# Patient Record
Sex: MALE | Race: WHITE | NOT HISPANIC OR LATINO | Employment: OTHER | URBAN - METROPOLITAN AREA
[De-identification: names, ages, dates, MRNs, and addresses within clinical notes are randomized per-mention and may not be internally consistent; named-entity substitution may affect disease eponyms.]

---

## 2019-05-22 ENCOUNTER — NURSE TRIAGE (OUTPATIENT)
Dept: NURSING | Facility: CLINIC | Age: 69
End: 2019-05-22

## 2019-05-22 NOTE — TELEPHONE ENCOUNTER
They are calling to ask what type of MD he should see for hurting skin with bug bites. I said internal medicine or family practice. Then, if they feel it's needed, they will give a referral to a specialist.  I connected the wifet with scheduling, for an appointment.  Kylah Olmstead RN-[facility]

## 2019-05-23 ENCOUNTER — OFFICE VISIT (OUTPATIENT)
Dept: FAMILY MEDICINE | Facility: CLINIC | Age: 69
End: 2019-05-23
Payer: MEDICARE

## 2019-05-23 ENCOUNTER — HOSPITAL ENCOUNTER (EMERGENCY)
Facility: CLINIC | Age: 69
Discharge: HOME | End: 2019-05-23
Attending: EMERGENCY MEDICINE | Admitting: EMERGENCY MEDICINE
Payer: MEDICARE

## 2019-05-23 ENCOUNTER — APPOINTMENT (OUTPATIENT)
Dept: CT IMAGING | Facility: CLINIC | Age: 69
End: 2019-05-23
Attending: EMERGENCY MEDICINE
Payer: MEDICARE

## 2019-05-23 VITALS
OXYGEN SATURATION: 99 % | SYSTOLIC BLOOD PRESSURE: 127 MMHG | WEIGHT: 167 LBS | TEMPERATURE: 98.3 F | RESPIRATION RATE: 10 BRPM | HEART RATE: 82 BPM | DIASTOLIC BLOOD PRESSURE: 77 MMHG | HEIGHT: 67 IN | BODY MASS INDEX: 26.21 KG/M2

## 2019-05-23 VITALS
TEMPERATURE: 100.6 F | RESPIRATION RATE: 16 BRPM | BODY MASS INDEX: 26.24 KG/M2 | SYSTOLIC BLOOD PRESSURE: 152 MMHG | DIASTOLIC BLOOD PRESSURE: 104 MMHG | HEART RATE: 94 BPM | HEIGHT: 67 IN | WEIGHT: 167.2 LBS | OXYGEN SATURATION: 98 %

## 2019-05-23 DIAGNOSIS — R55 NEAR SYNCOPE: Primary | ICD-10-CM

## 2019-05-23 DIAGNOSIS — R55 NEAR SYNCOPE: ICD-10-CM

## 2019-05-23 DIAGNOSIS — E03.9 HYPOTHYROIDISM, UNSPECIFIED TYPE: ICD-10-CM

## 2019-05-23 DIAGNOSIS — M47.26 OSTEOARTHRITIS OF SPINE WITH RADICULOPATHY, LUMBAR REGION: ICD-10-CM

## 2019-05-23 DIAGNOSIS — M51.379 DEGENERATION OF LUMBAR OR LUMBOSACRAL INTERVERTEBRAL DISC: ICD-10-CM

## 2019-05-23 DIAGNOSIS — B02.9 HERPES ZOSTER WITHOUT COMPLICATION: ICD-10-CM

## 2019-05-23 DIAGNOSIS — G89.29 CHRONIC LOW BACK PAIN, UNSPECIFIED BACK PAIN LATERALITY, WITH SCIATICA PRESENCE UNSPECIFIED: ICD-10-CM

## 2019-05-23 DIAGNOSIS — M54.5 CHRONIC LOW BACK PAIN, UNSPECIFIED BACK PAIN LATERALITY, WITH SCIATICA PRESENCE UNSPECIFIED: ICD-10-CM

## 2019-05-23 PROBLEM — F41.1 GAD (GENERALIZED ANXIETY DISORDER): Status: ACTIVE | Noted: 2017-05-23

## 2019-05-23 LAB
ALBUMIN SERPL-MCNC: 4 G/DL (ref 3.4–5)
ALBUMIN UR-MCNC: NEGATIVE MG/DL
ALP SERPL-CCNC: 104 U/L (ref 40–150)
ALT SERPL W P-5'-P-CCNC: 23 U/L (ref 0–70)
ANION GAP SERPL CALCULATED.3IONS-SCNC: 6 MMOL/L (ref 3–14)
APPEARANCE UR: CLEAR
AST SERPL W P-5'-P-CCNC: 22 U/L (ref 0–45)
BASOPHILS # BLD AUTO: 0.1 10E9/L (ref 0–0.2)
BASOPHILS NFR BLD AUTO: 0.6 %
BILIRUB SERPL-MCNC: 0.6 MG/DL (ref 0.2–1.3)
BILIRUB UR QL STRIP: NEGATIVE
BUN SERPL-MCNC: 12 MG/DL (ref 7–30)
CALCIUM SERPL-MCNC: 8.9 MG/DL (ref 8.5–10.1)
CHLORIDE SERPL-SCNC: 106 MMOL/L (ref 94–109)
CO2 SERPL-SCNC: 30 MMOL/L (ref 20–32)
COLOR UR AUTO: ABNORMAL
CREAT SERPL-MCNC: 0.94 MG/DL (ref 0.66–1.25)
DIFFERENTIAL METHOD BLD: NORMAL
EOSINOPHIL # BLD AUTO: 0.1 10E9/L (ref 0–0.7)
EOSINOPHIL NFR BLD AUTO: 1.7 %
ERYTHROCYTE [DISTWIDTH] IN BLOOD BY AUTOMATED COUNT: 12.8 % (ref 10–15)
GFR SERPL CREATININE-BSD FRML MDRD: 81 ML/MIN/{1.73_M2}
GLUCOSE BLDC GLUCOMTR-MCNC: 85 MG/DL (ref 70–99)
GLUCOSE SERPL-MCNC: 90 MG/DL (ref 70–99)
GLUCOSE UR STRIP-MCNC: NEGATIVE MG/DL
HCT VFR BLD AUTO: 48.9 % (ref 40–53)
HGB BLD-MCNC: 15.8 G/DL (ref 13.3–17.7)
HGB UR QL STRIP: NEGATIVE
IMM GRANULOCYTES # BLD: 0 10E9/L (ref 0–0.4)
IMM GRANULOCYTES NFR BLD: 0.4 %
KETONES UR STRIP-MCNC: NEGATIVE MG/DL
LACTATE BLD-SCNC: 1.4 MMOL/L (ref 0.7–2)
LEUKOCYTE ESTERASE UR QL STRIP: NEGATIVE
LYMPHOCYTES # BLD AUTO: 1.6 10E9/L (ref 0.8–5.3)
LYMPHOCYTES NFR BLD AUTO: 19.7 %
MCH RBC QN AUTO: 28.7 PG (ref 26.5–33)
MCHC RBC AUTO-ENTMCNC: 32.3 G/DL (ref 31.5–36.5)
MCV RBC AUTO: 89 FL (ref 78–100)
MONOCYTES # BLD AUTO: 1 10E9/L (ref 0–1.3)
MONOCYTES NFR BLD AUTO: 12 %
NEUTROPHILS # BLD AUTO: 5.3 10E9/L (ref 1.6–8.3)
NEUTROPHILS NFR BLD AUTO: 65.6 %
NITRATE UR QL: NEGATIVE
NRBC # BLD AUTO: 0 10*3/UL
NRBC BLD AUTO-RTO: 0 /100
PH UR STRIP: 8 PH (ref 5–7)
PLATELET # BLD AUTO: 213 10E9/L (ref 150–450)
POTASSIUM SERPL-SCNC: 3.5 MMOL/L (ref 3.4–5.3)
PROT SERPL-MCNC: 7.1 G/DL (ref 6.8–8.8)
RBC # BLD AUTO: 5.5 10E12/L (ref 4.4–5.9)
RBC #/AREA URNS AUTO: 1 /HPF (ref 0–2)
SODIUM SERPL-SCNC: 142 MMOL/L (ref 133–144)
SOURCE: ABNORMAL
SP GR UR STRIP: 1.02 (ref 1–1.03)
TROPONIN I SERPL-MCNC: <0.015 UG/L (ref 0–0.04)
UROBILINOGEN UR STRIP-MCNC: 0 MG/DL (ref 0–2)
WBC # BLD AUTO: 8 10E9/L (ref 4–11)
WBC #/AREA URNS AUTO: <1 /HPF (ref 0–5)

## 2019-05-23 PROCEDURE — 25000128 H RX IP 250 OP 636: Performed by: EMERGENCY MEDICINE

## 2019-05-23 PROCEDURE — 93005 ELECTROCARDIOGRAM TRACING: CPT | Performed by: EMERGENCY MEDICINE

## 2019-05-23 PROCEDURE — 70450 CT HEAD/BRAIN W/O DYE: CPT

## 2019-05-23 PROCEDURE — 00000146 ZZHCL STATISTIC GLUCOSE BY METER IP

## 2019-05-23 PROCEDURE — 85025 COMPLETE CBC W/AUTO DIFF WBC: CPT | Performed by: EMERGENCY MEDICINE

## 2019-05-23 PROCEDURE — 87086 URINE CULTURE/COLONY COUNT: CPT | Performed by: EMERGENCY MEDICINE

## 2019-05-23 PROCEDURE — 83605 ASSAY OF LACTIC ACID: CPT | Performed by: EMERGENCY MEDICINE

## 2019-05-23 PROCEDURE — 40000275 ZZH STATISTIC RCP TIME EA 10 MIN

## 2019-05-23 PROCEDURE — 80053 COMPREHEN METABOLIC PANEL: CPT | Performed by: EMERGENCY MEDICINE

## 2019-05-23 PROCEDURE — 74177 CT ABD & PELVIS W/CONTRAST: CPT

## 2019-05-23 PROCEDURE — 25000125 ZZHC RX 250: Performed by: EMERGENCY MEDICINE

## 2019-05-23 PROCEDURE — 81001 URINALYSIS AUTO W/SCOPE: CPT | Performed by: EMERGENCY MEDICINE

## 2019-05-23 PROCEDURE — 99291 CRITICAL CARE FIRST HOUR: CPT | Mod: 25 | Performed by: EMERGENCY MEDICINE

## 2019-05-23 PROCEDURE — A9270 NON-COVERED ITEM OR SERVICE: HCPCS | Mod: GY | Performed by: EMERGENCY MEDICINE

## 2019-05-23 PROCEDURE — 87040 BLOOD CULTURE FOR BACTERIA: CPT | Performed by: EMERGENCY MEDICINE

## 2019-05-23 PROCEDURE — 93010 ELECTROCARDIOGRAM REPORT: CPT | Mod: Z6 | Performed by: EMERGENCY MEDICINE

## 2019-05-23 PROCEDURE — 70498 CT ANGIOGRAPHY NECK: CPT

## 2019-05-23 PROCEDURE — 84484 ASSAY OF TROPONIN QUANT: CPT | Performed by: EMERGENCY MEDICINE

## 2019-05-23 PROCEDURE — 25000132 ZZH RX MED GY IP 250 OP 250 PS 637: Mod: GY | Performed by: EMERGENCY MEDICINE

## 2019-05-23 PROCEDURE — 99214 OFFICE O/P EST MOD 30 MIN: CPT | Performed by: FAMILY MEDICINE

## 2019-05-23 PROCEDURE — 99285 EMERGENCY DEPT VISIT HI MDM: CPT | Mod: 25 | Performed by: EMERGENCY MEDICINE

## 2019-05-23 PROCEDURE — 71260 CT THORAX DX C+: CPT

## 2019-05-23 RX ORDER — VALACYCLOVIR HYDROCHLORIDE 1 G/1
1000 TABLET, FILM COATED ORAL 3 TIMES DAILY
Qty: 21 TABLET | Refills: 0 | Status: SHIPPED | OUTPATIENT
Start: 2019-05-23 | End: 2021-03-23

## 2019-05-23 RX ORDER — FLUOCINONIDE TOPICAL SOLUTION USP, 0.05% 0.5 MG/ML
SOLUTION TOPICAL 2 TIMES DAILY PRN
Status: ON HOLD | COMMUNITY
Start: 2019-01-07 | End: 2021-04-29

## 2019-05-23 RX ORDER — IOPAMIDOL 755 MG/ML
70 INJECTION, SOLUTION INTRAVASCULAR ONCE
Status: COMPLETED | OUTPATIENT
Start: 2019-05-23 | End: 2019-05-23

## 2019-05-23 RX ORDER — LEVOTHYROXINE SODIUM 75 UG/1
75 TABLET ORAL EVERY MORNING
Status: ON HOLD | COMMUNITY
Start: 2019-04-26 | End: 2022-01-04

## 2019-05-23 RX ORDER — ACETAMINOPHEN 500 MG
1000 TABLET ORAL ONCE
Status: COMPLETED | OUTPATIENT
Start: 2019-05-23 | End: 2019-05-23

## 2019-05-23 RX ORDER — SODIUM CHLORIDE 9 MG/ML
1000 INJECTION, SOLUTION INTRAVENOUS CONTINUOUS
Status: DISCONTINUED | OUTPATIENT
Start: 2019-05-23 | End: 2019-05-23 | Stop reason: HOSPADM

## 2019-05-23 RX ORDER — HYDROCORTISONE 10 MG/1
TABLET ORAL
Status: ON HOLD | COMMUNITY
Start: 2019-05-21 | End: 2022-01-04

## 2019-05-23 RX ORDER — TRIAMCINOLONE ACETONIDE 1 MG/G
CREAM TOPICAL 2 TIMES DAILY
COMMUNITY
Start: 2019-02-20 | End: 2022-02-14

## 2019-05-23 RX ORDER — LIDOCAINE 40 MG/G
CREAM TOPICAL
Status: DISCONTINUED | OUTPATIENT
Start: 2019-05-23 | End: 2019-05-23 | Stop reason: HOSPADM

## 2019-05-23 RX ORDER — MINOCYCLINE HYDROCHLORIDE 50 MG/1
50 CAPSULE ORAL DAILY
COMMUNITY
Start: 2019-02-20 | End: 2021-08-23

## 2019-05-23 RX ADMIN — IOPAMIDOL 70 ML: 755 INJECTION, SOLUTION INTRAVENOUS at 09:21

## 2019-05-23 RX ADMIN — SODIUM CHLORIDE 100 ML: 9 INJECTION, SOLUTION INTRAVENOUS at 09:21

## 2019-05-23 RX ADMIN — ACETAMINOPHEN 1000 MG: 500 TABLET, FILM COATED ORAL at 09:50

## 2019-05-23 ASSESSMENT — MIFFLIN-ST. JEOR
SCORE: 1468.2
SCORE: 1469.1

## 2019-05-23 ASSESSMENT — ENCOUNTER SYMPTOMS
WEAKNESS: 1
BACK PAIN: 1
ABDOMINAL PAIN: 0
SHORTNESS OF BREATH: 0
FEVER: 0

## 2019-05-23 NOTE — ED NOTES
Responded to RRT in clinic for presyncopal pt -pt woke at 0200 with fever and nausea-has had ha x 2 days and painful itching rt buttock and rt lower back pain x 2 1/2 weeks   Blood glucose 85

## 2019-05-23 NOTE — ED PROVIDER NOTES
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  Patient had originally been seen and schedule an appointment in clinic after patient noted rash on the buttock.  He also was not feeling well overnight, with chills, borderline elevated temperature, and overall generalized malaise.  He denies any other significant changes in health.  Does have mild generalized headache, with no severe headache.  Denies any neck pain.  States the buttocks, no new rashes have been noted.  Denies any abdominal pain, chest pain, or shortness of breath.  Patient was at clinic this morning, when he felt as if he was becoming nauseous, and going to vomit.  He was beginning to walk down the hallway a clinic where he felt as if his vision was going dark, and black, and subsequently was helped to the floor.  There was no loss of consciousness.  Did not hit his head.  Patient does take aspirin, no other blood thinning medications.  Other than generalized achiness currently, patient denies any other specific complaints.    Allergies:  Allergies   Allergen Reactions     Penicillins Other (See Comments) and Hives     swelling, hives         Problem List:    Patient Active Problem List    Diagnosis Date Noted     TUYET (generalized anxiety disorder) 05/23/2017     Priority: Medium     Degeneration of lumbar or lumbosacral intervertebral disc 01/04/2016     Priority: Medium     Osteoarthritis of spine with radiculopathy, lumbar region 01/04/2016     Priority: Medium     Epidural lipomatosis 12/03/2015     Priority: Medium     Chronic lower back  "pain 12/03/2015     Priority: Medium     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 Thyroid started 4/10  Low cortisol [1] 5/7/10 so secondary adrenal insufficiency  hypophysectomy 8/23/10       Eczema 01/12/2009     Priority: Medium     Calculus of kidney 09/09/2004     Priority: Medium     Rosacea 09/09/2004     Priority: Medium        Past Medical History:    No past medical history on file.    Past Surgical History:    No past surgical history on file.    Family History:    Family History   Problem Relation Age of Onset     Lupus Mother      ALS Father      Rheumatoid Arthritis Sister        Social History:  Marital Status:   [2]  Social History     Tobacco Use     Smoking status: Never Smoker     Smokeless tobacco: Never Used   Substance Use Topics     Alcohol use: Yes     Comment: rare     Drug use: Never        Medications:      cholecalciferol (VITAMIN D-1000 MAX ST) 1000 units TABS   fluocinonide (LIDEX) 0.05 % external solution   hydrocortisone (CORTEF) 10 MG tablet   levothyroxine (SYNTHROID/LEVOTHROID) 75 MCG tablet   minocycline (MINOCIN/DYNACIN) 50 MG capsule   triamcinolone (KENALOG) 0.1 % external cream   valACYclovir (VALTREX) 1000 mg tablet         Review of Systems   Constitutional: Negative for fever.   Respiratory: Negative for shortness of breath.    Cardiovascular: Negative for chest pain.   Gastrointestinal: Negative for abdominal pain.   Musculoskeletal: Positive for back pain.   Neurological: Positive for weakness.   All other systems reviewed and are negative.      Physical Exam   BP: (!) 152/98  Pulse: 74  Heart Rate: 72  Temp: 98.3  F (36.8  C)  Resp: 16  Height: 168.9 cm (5' 6.5\")  Weight: 75.8 kg (167 lb)  SpO2: 98 %      Physical Exam  /77   " "Pulse 82   Temp 98.3  F (36.8  C) (Oral)   Resp 10   Ht 1.689 m (5' 6.5\")   Wt 75.8 kg (167 lb)   SpO2 99%   BMI 26.55 kg/m    General: alert, interactive, in no significant distress  Head: atraumatic  Nose: no rhinorrhea or epistaxis  Ears: no external auditory canal discharge or bleeding.    Eyes: Sclera nonicteric. Conjunctiva noninjected. PERRL,    Mouth: no tonsillar erythema, edema, or exudate  Neck: supple, no palp LAD  Lungs: CTAB  CV: RRR, S1/S2; peripheral pulses palpable and symmetric  Abdomen: soft, nt, nd, no guarding or rebound. Positive bowel sounds  Extremities: no cyanosis or edema  Back: No back pain.  There is rash of the buttocks, with erythema, and small vesicular type lesions that spares the perineum  Skin: no rash or diaphoresis  Neuro:   strength 5/5 in UE and LEs bilaterally, sensation intact to light touch in UE and LEs bilaterally;       ED Course        Procedures          EKG, reviewed by myself shows sinus rhythm.  Rate 76.  Normal intervals.  No acute ischemic appearing changes.  No old EKG for comparison.    Critical Care time:  none               Results for orders placed or performed during the hospital encounter of 05/23/19 (from the past 24 hour(s))   CBC with platelets differential   Result Value Ref Range    WBC 8.0 4.0 - 11.0 10e9/L    RBC Count 5.50 4.4 - 5.9 10e12/L    Hemoglobin 15.8 13.3 - 17.7 g/dL    Hematocrit 48.9 40.0 - 53.0 %    MCV 89 78 - 100 fl    MCH 28.7 26.5 - 33.0 pg    MCHC 32.3 31.5 - 36.5 g/dL    RDW 12.8 10.0 - 15.0 %    Platelet Count 213 150 - 450 10e9/L    Diff Method Automated Method     % Neutrophils 65.6 %    % Lymphocytes 19.7 %    % Monocytes 12.0 %    % Eosinophils 1.7 %    % Basophils 0.6 %    % Immature Granulocytes 0.4 %    Nucleated RBCs 0 0 /100    Absolute Neutrophil 5.3 1.6 - 8.3 10e9/L    Absolute Lymphocytes 1.6 0.8 - 5.3 10e9/L    Absolute Monocytes 1.0 0.0 - 1.3 10e9/L    Absolute Eosinophils 0.1 0.0 - 0.7 10e9/L    Absolute " Basophils 0.1 0.0 - 0.2 10e9/L    Abs Immature Granulocytes 0.0 0 - 0.4 10e9/L    Absolute Nucleated RBC 0.0    Comprehensive metabolic panel   Result Value Ref Range    Sodium 142 133 - 144 mmol/L    Potassium 3.5 3.4 - 5.3 mmol/L    Chloride 106 94 - 109 mmol/L    Carbon Dioxide 30 20 - 32 mmol/L    Anion Gap 6 3 - 14 mmol/L    Glucose 90 70 - 99 mg/dL    Urea Nitrogen 12 7 - 30 mg/dL    Creatinine 0.94 0.66 - 1.25 mg/dL    GFR Estimate 81 >60 mL/min/[1.73_m2]    GFR Estimate If Black >90 >60 mL/min/[1.73_m2]    Calcium 8.9 8.5 - 10.1 mg/dL    Bilirubin Total 0.6 0.2 - 1.3 mg/dL    Albumin 4.0 3.4 - 5.0 g/dL    Protein Total 7.1 6.8 - 8.8 g/dL    Alkaline Phosphatase 104 40 - 150 U/L    ALT 23 0 - 70 U/L    AST 22 0 - 45 U/L   Lactic acid whole blood   Result Value Ref Range    Lactic Acid 1.4 0.7 - 2.0 mmol/L   Troponin I   Result Value Ref Range    Troponin I ES <0.015 0.000 - 0.045 ug/L   Glucose by meter   Result Value Ref Range    Glucose 85 70 - 99 mg/dL   Head CT w/o contrast    Narrative    CT SCAN OF THE HEAD WITHOUT CONTRAST   5/23/2019 9:26 AM     HISTORY: TIA, initial exam. Near syncope with headache.    TECHNIQUE: Axial images of the head and coronal reformations without  IV contrast material. Radiation dose for this scan was reduced using  automated exposure control, adjustment of the mA and/or kV according  to patient size, or iterative reconstruction technique.    COMPARISON: None.    FINDINGS: There is no evidence of intracranial hemorrhage, mass, acute  infarct or anomaly. The ventricles are normal in size, shape and  configuration. The brain parenchyma and subarachnoid spaces are  normal.     The visualized portions of the sinuses and mastoids appear normal. The  bony calvarium and bones of the skull base appear intact.       Impression    IMPRESSION: No evidence of acute intracranial hemorrhage, mass, or  herniation.      HOLLY CHOPRA MD   CT Chest/Abdomen/Pelvis w Contrast    Narrative    CT  CHEST/ABDOMEN/PELVIS WITH CONTRAST  5/23/2019 9:37 AM     HISTORY: Sepsis, fever.  Near syncope. History of kidney stones with  urinary changes.    TECHNIQUE: Axial images from the thoracic inlet to the symphysis are  performed with additional coronal reformatted images. 70 mL of Isovue  370 are given intravenously.  Radiation dose for this scan was reduced  using automated exposure control, adjustment of the mA and/or kV  according to patient size, or iterative reconstruction technique.    FINDINGS:   Chest: The lungs are clear without infiltrate, consolidation or mass.  No pleural or pericardial fluid. The heart is normal in size.  Esophagus is unremarkable. Thyroid gland appears normal in size. No  enlarged lymph nodes in the chest or axillas.    Abdomen: Probable mild fatty liver infiltration. A few tiny  subcentimeter low-attenuation liver lesions are present which are  indeterminate. The spleen, gallbladder, pancreas, adrenal glands and  kidneys are within normal limits. A few bilateral renal cortical cysts  are present. A few of these are too small to definitively  characterize, but all appear fairly low in density. Kidneys enhance  and excrete contrast promptly and symmetrically. No hydronephrosis. No  enlarged abdominal lymph nodes. The bowel is unremarkable. No  obstruction or diverticulitis. Appendix is normal. Small  fat-containing umbilical hernia is noted.    Pelvis: Prostate gland is mildly enlarged. Bladder is unremarkable  without wall thickening or nodularity. Bilateral ureteral jets are  visualized. Rectum is unremarkable. A few mildly prominent pelvic and  inguinal lymph nodes are present. No free pelvic fluid or inflammatory  changes. Bone window examination shows no aggressive-appearing bone  lesions.      Impression    IMPRESSION:  1. No acute changes in the chest, abdomen or pelvis to account for  patient's symptoms. A few indeterminate low-attenuation liver lesions  are present. Follow-up  liver MRI could be performed for further  assessment if clinically warranted.  2. Bilateral renal cortical cysts. Some of these are also too small to  characterize by CT. No enlarged lymph nodes. No bowel obstruction,  diverticulitis or appendicitis.    STEPHANIE ELI MD   CTA Head Neck with Contrast    Narrative    CT ANGIOGRAM OF THE HEAD AND NECK WITH CONTRAST  5/23/2019 9:37 AM     HISTORY: TIA, initial exam. Near syncope with vision going dark and  falling to the floor.  Pituitary removed 2012.     TECHNIQUE:  CT angiography with an injection of 70 mL Isovue 370 IV  with scans through the head and neck. Images were transferred to a  separate 3-D workstation where multiplanar reformations and 3-D images  were created. Estimates of carotid stenoses are made relative to the  distal internal carotid artery diameters except as noted. Radiation  dose for this scan was reduced using automated exposure control,  adjustment of the mA and/or kV according to patient size, or iterative  reconstruction technique.    COMPARISON: None.     CT HEAD FINDINGS: No contrast enhancing lesions. Cerebral blood flow  is grossly normal.     CT ANGIOGRAM HEAD FINDINGS:  The major intracranial arteries including  the proximal branches of the anterior cerebral, middle cerebral, and  posterior cerebral arteries appear patent without vascular cutoff. No  aneurysm identified. Focal moderate to severe stenosis of the right P2  branch. Moderate stenosis of the mid right M1 segment. Moderate  stenosis of the proximal left M1 segment.    CT ANGIOGRAM NECK FINDINGS: Normal origin of the great vessels from  the aortic arch.     Right carotid artery: The right common and internal carotid arteries  are patent. No significant stenosis or atherosclerotic disease in the  carotid artery.     Left carotid artery: The left common and internal carotid arteries are  patent. No significant stenosis or atherosclerotic disease in the  carotid artery.      Vertebral arteries: Vertebral arteries are patent without evidence of  dissection. No significant stenosis.     Other findings: None.       Impression    IMPRESSION:   1. Patent proximal major intracranial arteries without vascular cutoff  identified. No aneurysm seen.  2. Multifocal stenoses of the intracranial vessels involving both the  anterior and posterior circulation including a moderate to severe  stenosis of the right P2 branch that supplies the right occipital  lobe. This may be due to intracranial atherosclerotic disease,  although vasculitis would also be in the differential.  3. Patent arteries in the neck without evidence of dissection. No  significant atherosclerotic disease or stenosis of the carotid  arteries.   UA with Microscopic   Result Value Ref Range    Color Urine Straw     Appearance Urine Clear     Glucose Urine Negative NEG^Negative mg/dL    Bilirubin Urine Negative NEG^Negative    Ketones Urine Negative NEG^Negative mg/dL    Specific Gravity Urine 1.020 1.003 - 1.035    Blood Urine Negative NEG^Negative    pH Urine 8.0 (H) 5.0 - 7.0 pH    Protein Albumin Urine Negative NEG^Negative mg/dL    Urobilinogen mg/dL 0.0 0.0 - 2.0 mg/dL    Nitrite Urine Negative NEG^Negative    Leukocyte Esterase Urine Negative NEG^Negative    Source Midstream Urine     WBC Urine <1 0 - 5 /HPF    RBC Urine 1 0 - 2 /HPF       Medications   lidocaine 1 % 0.1-1 mL (has no administration in time range)   lidocaine (LMX4) cream (has no administration in time range)   sodium chloride (PF) 0.9% PF flush 3 mL (has no administration in time range)   sodium chloride (PF) 0.9% PF flush 3 mL (has no administration in time range)   0.9% sodium chloride BOLUS (has no administration in time range)     Followed by   sodium chloride 0.9% infusion (has no administration in time range)   acetaminophen (TYLENOL) tablet 1,000 mg (1,000 mg Oral Given 5/23/19 6134)   iopamidol (ISOVUE-370) solution 70 mL (70 mLs Intravenous Given  5/23/19 0921)   sodium chloride 0.9 % bag 500mL for CT scan flush use (100 mLs Intravenous Given 5/23/19 0921)       Assessments & Plan (with Medical Decision Making)  68 year old male, presenting to the emergency department with concerns regarding fever, chills, with buttock rash.  Patient had near syncopal episode in clinic.  Chart from clinic is not able to be reviewed at this point with no documentation from the clinic appointment as of yet.  However, nurses who have responded to the rapid response to provide additional history information, as does the patient, and his wife.  Clinic note does state that patient was febrile at 100.6 degrees.  Therefore, initial work-up was geared towards sepsis, and lactate, blood cultures, and urine cultures are performed.  Laboratory work-up returns with urine showing no signs of infection.  CBC is normal, with normal white blood cell count.  CMP normal, lactate normal, troponin negative.    Given the near syncopal episode, with slight headache, CT with CT angiogram is performed.  Patient with patent proximal major intracranial arteries.  There are multifocal stenoses throughout the remainder of the vessels.  Consideration is for vasculitis.  However, patient without any focal neurologic deficits, and do not feel that stroke is likely.  Additionally near syncope would be more likely a result of the more proximal vessels, and patient without significant narrowing of these vessels.    Patient has had generalized weakness over the past number of days.  Also did have rash, with what appears to be preceding pain, with numbness, and tingling, consistent with shingles.  Examination is consistent with shingles rash of the right buttocks.  He will be prescribed acyclovir.  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  Will not give additional steroids.  His blood pressure and vitals have otherwise been normal  throughout ED course.    Patient was given acetaminophen in the emergency department.  He feels well.  He is amatory without difficulty.  At this point, exact cause of near syncope is unknown.  Patient did not eat breakfast this morning secondary to planned or potentially planned blood work.    No palpitations, chest pain, negative troponin to suggest cardiac etiology.  Regardless, patient feels okay for discharge home, and will be discharged home, and follow-up as needed with primary care provider.  Encouraged to return in follow-up next week with clinic provider, and return if new, or worsening symptoms develop.     I have reviewed the nursing notes.    I have reviewed the findings, diagnosis, plan and need for follow up with the patient.          Medication List      Started    valACYclovir 1000 mg tablet  Commonly known as:  VALTREX  1,000 mg, Oral, 3 TIMES DAILY            Final diagnoses:   Near syncope   Hypothyroidism, unspecified type   Degeneration of lumbar or lumbosacral intervertebral disc   Osteoarthritis of spine with radiculopathy, lumbar region   Herpes zoster without complication   Chronic low back pain, unspecified back pain laterality, with sciatica presence unspecified       5/23/2019   [facility] EMERGENCY DEPARTMENT     Vishal Walker MD  05/23/19 1219

## 2019-05-23 NOTE — PROGRESS NOTES
"Subjective     Ifrah Huitron is a 68 year old male who presents to clinic today for the following health issues:  Chief Complaint   Patient presents with     Derm Problem     Pt here for rash/possible bug bites.  Today not feeling good.       HPI   Rash  Onset: 1 1/2 wks ago, started with back pain, bites started yesterday    Description:   Location: right buttocks and scrotum  Character: raised, red  Itching (Pruritis): YES    Progression of Symptoms:  worsening    Accompanying Signs & Symptoms:  Fever: YES- started last night  Body aches or joint pain: YES- body aches  Sore throat symptoms: no   Recent cold symptoms: no   Also nausea, headache    History:   Previous similar rash: no     Precipitating factors:   Exposure to similar rash: no   New exposures: None   Recent travel: no     Alleviating factors:  none    Therapies Tried and outcome: Aspirin, tylenol, tums - relieved some of the back pain  Verified above history with patient.    Patient denies recent illness aside from the above.    Patient denies being on any steroids recently, nor on any immunosuppressant.    Patient states he had chicken pox as a child.    Patient states since last night, he has \"not been feeling well\"  He repeatedly stated this visit that he feels like he needs to throw up but cannot do so.  He states he just has low back pain and intermittent pain down either legs.  He said he only ate a few pieces of crackers this morning and last full meal was 6pm last night.      Patient Active Problem List   Diagnosis     Calculus of kidney     Degeneration of lumbar or lumbosacral intervertebral disc     Eczema     Epidural lipomatosis     TUYET (generalized anxiety disorder)     Hypopituitarism (H)     Hypothyroidism     Osteoarthritis of spine with radiculopathy, lumbar region     Pituitary macroadenoma (H)     Rosacea     Chronic lower back pain     History reviewed. No pertinent surgical history.    Social History     Tobacco Use     Smoking " "status: Never Smoker     Smokeless tobacco: Never Used   Substance Use Topics     Alcohol use: Yes     Comment: rare     Family History   Problem Relation Age of Onset     Lupus Mother      ALS Father      Rheumatoid Arthritis Sister          Current Outpatient Medications   Medication Sig Dispense Refill     cholecalciferol (VITAMIN D-1000 MAX ST) 1000 units TABS Take 1,000 Units by mouth       fluocinonide (LIDEX) 0.05 % external solution        hydrocortisone (CORTEF) 10 MG tablet        levothyroxine (SYNTHROID/LEVOTHROID) 75 MCG tablet        minocycline (MINOCIN/DYNACIN) 50 MG capsule        triamcinolone (KENALOG) 0.1 % external cream        Allergies   Allergen Reactions     Penicillins Other (See Comments) and Hives     swelling, hives       Reviewed and updated as needed this visit by Provider         Review of Systems   C: NEGATIVE for  change in weight  I: see above  E: NEGATIVE for vision changes or irritation  ENT: NEGATIVE for ear, mouth and throat problems  R: NEGATIVE for significant cough or SOB  CV: NEGATIVE for chest pain, palpitations or peripheral edema  GI: see above  :negative for dysuria, hematuria, decreased urinary stream, or discharge  M: see above  N: NEGATIVE for focal weakness, dizziness or paresthesias  E: NEGATIVE for temperature intolerance, skin/hair changes  H: NEGATIVE for bleeding problems        Objective    BP (!) 152/104   Pulse 94   Temp 100.6  F (38.1  C) (Tympanic)   Resp 16   Ht 1.689 m (5' 6.5\")   Wt 75.8 kg (167 lb 3.2 oz)   SpO2 98%   BMI 26.58 kg/m    Body mass index is 26.58 kg/m .  Physical Exam   GENERAL:  Initially alert, oriented x 3; after BP was remeasured, patient said he needs to go to the bathroom due to feeling nauseated; as he was escorted to the restroom, he said he felt he was going to faint, patient was brought back to room where he just leaned over onto exam table and laid there conscious. Patient never lost consciousness until rapid response " team showed.  SKIN: good turgor; cluster of erythematous papules and few vesicles on the right superior gluteal area not crossing midline.  CV: normal rate, regular rhythm, no murmur    Rest of exam not carried out due to patient's near syncope episode    Diagnostic Test Results:  none         Assessment & Plan     Ifrah was seen today for derm problem.    Diagnoses and all orders for this visit:    Near syncope  Due to patient's near syncope, RRT was called.  Differentials for this is vast: hypoglycemia, vasovagal, dysrhythmias, CVA, MI, sepsis among others.  Due to this, patient was advised to be evaluated further in the ER.  Patient was transferred to wheeled bed and brought to the ER by RRT.    Herpes zoster without complication  Will be treated with valacyclovir. Rx to be started after ER evaluation.               There are no Patient Instructions on file for this visit.    No follow-ups on file.    Sukhdev Kohler MD  [facility] [place]

## 2019-05-23 NOTE — ED AVS SNAPSHOT
[facility] Emergency Department  [address] [address]  Phone:  [phone]  Fax:  [phone]                                    Ifrah Huitron   MRN: [id number]    Department:  [facility] Emergency Department   Date of Visit:  5/23/2019           After Visit Summary Signature Page    I have received my discharge instructions, and my questions have been answered. I have discussed any challenges I see with this plan with the nurse or doctor.    ..........................................................................................................................................  Patient/Patient Representative Signature      ..........................................................................................................................................  Patient Representative Print Name and Relationship to Patient    ..................................................               ................................................  Date                                   Time    ..........................................................................................................................................  Reviewed by Signature/Title    ...................................................              ..............................................  Date                                               Time          22EPIC Rev 08/18

## 2019-05-23 NOTE — DISCHARGE INSTRUCTIONS
Valtrex as prescribed.  Follow-up with primary care provider next week.  Return if worsening symptoms.

## 2019-05-24 LAB
BACTERIA SPEC CULT: NO GROWTH
Lab: NORMAL
SPECIMEN SOURCE: NORMAL

## 2019-05-29 LAB
BACTERIA SPEC CULT: NO GROWTH
Lab: NORMAL
SPECIMEN SOURCE: NORMAL

## 2019-06-04 ENCOUNTER — HOSPITAL ENCOUNTER (OUTPATIENT)
Dept: PHYSICAL THERAPY | Facility: CLINIC | Age: 69
Setting detail: THERAPIES SERIES
End: 2019-06-04
Attending: EMERGENCY MEDICINE
Payer: MEDICARE

## 2019-06-04 DIAGNOSIS — G89.29 CHRONIC LOW BACK PAIN, UNSPECIFIED BACK PAIN LATERALITY, WITH SCIATICA PRESENCE UNSPECIFIED: ICD-10-CM

## 2019-06-04 DIAGNOSIS — M51.379 DEGENERATION OF LUMBAR OR LUMBOSACRAL INTERVERTEBRAL DISC: ICD-10-CM

## 2019-06-04 DIAGNOSIS — M54.5 CHRONIC LOW BACK PAIN, UNSPECIFIED BACK PAIN LATERALITY, WITH SCIATICA PRESENCE UNSPECIFIED: ICD-10-CM

## 2019-06-04 DIAGNOSIS — M47.26 OSTEOARTHRITIS OF SPINE WITH RADICULOPATHY, LUMBAR REGION: ICD-10-CM

## 2019-06-04 PROCEDURE — 97110 THERAPEUTIC EXERCISES: CPT | Mod: GP | Performed by: PHYSICAL THERAPIST

## 2019-06-04 PROCEDURE — 97161 PT EVAL LOW COMPLEX 20 MIN: CPT | Mod: GP | Performed by: PHYSICAL THERAPIST

## 2019-06-04 NOTE — PROGRESS NOTES
06/04/19 1300   General Information   Type of Visit Initial OP Ortho PT Evaluation   Start of Care Date 06/04/19   Referring Physician Vishal Walker MD   Patient/Family Goals Statement To get SIJ in shape for driving   Orders Evaluate and Treat   Date of Order 05/23/19   Certification Required? Yes   Medical Diagnosis Degeneration of lumbar or lumbosacral intervertebral disc; Osteoarthritis of spine with radiculopathy, lumbar region; Chronic low back pain, unspecified back pain laterality, with sciatica presence unspecified   Surgical/Medical history reviewed Yes   Body Part(s)   Body Part(s) Lumbar Spine/SI   Presentation and Etiology   Pertinent history of current problem (include personal factors and/or comorbidities that impact the POC) Pt reports: several years ago he had mid back pain with breathing difficulties.  Underwent 2 injections for this that did help.     Impairments A. Pain   Functional Limitations perform activities of daily living;perform desired leisure / sports activities   Symptom Location Right L4-5, at times right medial calf and thigh   How/Where did it occur From insidious onset   Onset date of current episode/exacerbation 05/17/19   Chronicity Chronic   Pain rating (0-10 point scale) Unable to rate   Pain quality C. Aching   Frequency of pain/symptoms B. Intermittent   Pain/symptoms are: The same all the time   Pain/symptoms exacerbated by A. Sitting;C. Lifting;D. Carrying;E. Rest;G. Certain positions;I. Bending;K. Home tasks;L. Work tasks   Pain/symptoms eased by C. Rest;E. Changing positions;F. Certain positions;I. OTC medication(s);H. Cold;J. Braces/supports   Progression of symptoms since onset: Unchanged   Prior Level of Function   Prior Level of Function-Mobility Independent   Prior Level of Function-ADLs Independent   Current Level of Function   Current Community Support Family/friend caregiver   Patient role/employment history A. Employed   Employment Comments [employer] -  frequently driving which causes right SIJ pain   Living environment [facility]   Fall Risk Screen   Fall screen completed by PT   Have you fallen 2 or more times in the past year? No   Have you fallen and had an injury in the past year? No   Is patient a fall risk? No   Abuse Screen (yes response referral indicated)   Feels Unsafe at Home or Work/School no   Feels Threatened by Someone no   Does Anyone Try to Keep You From Having Contact with Others or Doing Things Outside Your Home? no   Physical Signs of Abuse Present no   Lumbar Spine/SI Objective Findings   Posture Increased thoracic kyphosis, andreina rounded shoulders   Gait/Locomotion Compensated trendelenburg right   Flexion ROM fingertips to mid shin, pain upon returning   Extension ROM 50% pain free   Right Side Bending ROM 50% pain free   Left Side Bending ROM 50% pain free   Hip Screen Limited right hip IR 15 deg; left=30 deg   Hip Flexion (L2) Strength 5/5   Hip Abduction Strength 3+/5   Hip Extension Strength 4/5   Knee Flexion Strength 5/5   Knee Extension (L3) Strength 5/5   Ankle Dorsiflexion (L4) Strength 5/5   Great Toe Extension (L5) Strength 5/5   Ankle Plantar Flexion (S1) Strength 5/5   SLR right=60 deg; left=70 deg   Planned Therapy Interventions   Planned Therapy Interventions ADL retraining;balance training;gait training;joint mobilization;manual therapy;motor coordination training;neuromuscular re-education;ROM;strengthening;stretching   Clinical Impression   Criteria for Skilled Therapeutic Interventions Met yes, treatment indicated   PT Diagnosis Right SIJ pain with impaired thoracic and hip mobility; weak core and glutes   Influenced by the following impairments Pain; weakness; impaired ROM   Functional limitations due to impairments Pain with driving and work duties, difficulty with prolonged sitting   Clinical Presentation Stable/Uncomplicated   Clinical Presentation Rationale (+) age; overall health; reduced pain s/p PT   Clinical  Decision Making (Complexity) Low complexity   Therapy Frequency other (see comments)   Predicted Duration of Therapy Intervention (days/wks) 1x every 2-3 weeks for 6 weeks   Risk & Benefits of therapy have been explained Yes   Patient, Family & other staff in agreement with plan of care Yes   Clinical Impression Comments Ifrah arrives today with chronic lumbar / SIJ pain; he will benefit from skilled PT to address the above impairments and functional limitations.   Education Assessment   Preferred Learning Style Listening;Demonstration;Pictures/video   Barriers to Learning No barriers   ORTHO GOALS   PT Ortho Eval Goals 1;2;3   Ortho Goal 1   Goal Description Patient will have >=4+/5 glute med strength to support the lumbar spine with ADL's.   Target Date 07/02/19   Ortho Goal 2   Goal Description Patient will be able to drive >=1 hr without SIJ pain.   Target Date 06/28/19   Ortho Goal 3   Goal Description Patient will be independent with his HEP to reduce future occurrence of pain and disability.   Target Date 06/25/19   Therapy Certification   Certification date from 06/04/19   Certification date to 07/16/19   Medical Diagnosis Degeneration of lumbar or lumbosacral intervertebral disc; Osteoarthritis of spine with radiculopathy, lumbar region; Chronic low back pain, unspecified back pain laterality, with sciatica presence unspecified       Natalie Chandler, PT, DPT  Doctor of Physical Therapy #[id number]  [facility]  [phone]  [email or web address]

## 2019-06-04 NOTE — PROGRESS NOTES
[facility]          OUTPATIENT PHYSICAL THERAPY ORTHOPEDIC EVALUATION  PLAN OF TREATMENT FOR OUTPATIENT REHABILITATION  (COMPLETE FOR INITIAL CLAIMS ONLY)  Patient's Last Name, First Name, M.I.  YOB: 1950  Ifrah Huitron    Provider s Name:  [facility]   Medical Record No.  [id number]   Start of Care Date:  06/04/19   Onset Date:  05/17/19   Type:     _X__PT   ___OT   ___SLP Medical Diagnosis:  Degeneration of lumbar or lumbosacral intervertebral disc; Osteoarthritis of spine with radiculopathy, lumbar region; Chronic low back pain, unspecified back pain laterality, with sciatica presence unspecified     PT Diagnosis:  Right SIJ pain with impaired thoracic and hip mobility; weak core and glutes   Visits from SOC:  1      _________________________________________________________________________________  Plan of Treatment/Functional Goals:  ADL retraining, balance training, gait training, joint mobilization, manual therapy, motor coordination training, neuromuscular re-education, ROM, strengthening, stretching           Goals     Goal Description: Patient will have >=4+/5 glute med strength to support the lumbar spine with ADL's.  Target Date: 07/02/19       Goal Description: Patient will be able to drive >=1 hr without SIJ pain.  Target Date: 06/28/19       Goal Description: Patient will be independent with his HEP to reduce future occurrence of pain and disability.  Target Date: 06/25/19            Therapy Frequency:  other (see comments)  Predicted Duration of Therapy Intervention:  1x every 2-3 weeks for 6 weeks    Natalie Chandler, PT                 I CERTIFY THE NEED FOR THESE SERVICES FURNISHED UNDER        THIS PLAN OF TREATMENT AND WHILE UNDER MY CARE     (Physician co-signature of this document indicates review and certification of the therapy plan).                       Certification Date From:  06/04/19   Certification Date To:   07/16/19    Referring Provider:  Vishal Walker MD    Initial Assessment        See Epic Evaluation Start of Care Date: 06/04/19

## 2019-10-29 ENCOUNTER — TRANSFERRED RECORDS (OUTPATIENT)
Dept: HEALTH INFORMATION MANAGEMENT | Facility: CLINIC | Age: 69
End: 2019-10-29

## 2019-12-09 NOTE — PROGRESS NOTES
Outpatient Physical Therapy Discharge Note     Patient: Ifrah Huitron  : 1950    Beginning/End Dates of Reporting Period:  19 to 2019    Referring Provider: Vishal Walker MD    Therapy Diagnosis: LBP     Client Self Report: Pt reports: several years ago he had mid back pain with breathing difficulties.  Underwent 2 injections for this that did help.      Objective Measurements:  Objective Measure: JUICE  Details: 15.56            Goals:  Goal Identifier     Goal Description Patient will have >=4+/5 glute med strength to support the lumbar spine with ADL's.   Target Date 19   Date Met      Progress:     Goal Identifier     Goal Description Patient will be able to drive >=1 hr without SIJ pain.   Target Date 19   Date Met      Progress:     Goal Identifier     Goal Description Patient will be independent with his HEP to reduce future occurrence of pain and disability.   Target Date 19   Date Met      Progress:       Progress Toward Goals:   Progress limited due to pt failing to schedul f/u appointment      Plan:  Discharge from therapy.    Discharge:    Reason for Discharge: Patient chooses to discontinue therapy.    Equipment Issued: Theraband    Discharge Plan: Patient to continue home program.  Natalie Chandler, PT, DTP, SCS  Doctor of Physical Therapy #[id number]  [facility]  [phone]  [email or web address]

## 2019-12-09 NOTE — ADDENDUM NOTE
Encounter addended by: Natalie Chandler, PT on: 12/9/2019 11:20 AM   Actions taken: Clinical Note Signed, Episode resolved

## 2020-11-23 ENCOUNTER — TRANSCRIBE ORDERS (OUTPATIENT)
Dept: OTHER | Age: 70
End: 2020-11-23

## 2020-11-23 DIAGNOSIS — Z12.11 SPECIAL SCREENING FOR MALIGNANT NEOPLASM OF COLON: Primary | ICD-10-CM

## 2020-11-25 DIAGNOSIS — Z11.59 ENCOUNTER FOR SCREENING FOR OTHER VIRAL DISEASES: Primary | ICD-10-CM

## 2020-12-14 DIAGNOSIS — Z11.59 ENCOUNTER FOR SCREENING FOR OTHER VIRAL DISEASES: ICD-10-CM

## 2020-12-14 PROCEDURE — U0003 INFECTIOUS AGENT DETECTION BY NUCLEIC ACID (DNA OR RNA); SEVERE ACUTE RESPIRATORY SYNDROME CORONAVIRUS 2 (SARS-COV-2) (CORONAVIRUS DISEASE [COVID-19]), AMPLIFIED PROBE TECHNIQUE, MAKING USE OF HIGH THROUGHPUT TECHNOLOGIES AS DESCRIBED BY CMS-2020-01-R: HCPCS | Performed by: SURGERY

## 2020-12-15 ENCOUNTER — ANESTHESIA EVENT (OUTPATIENT)
Dept: GASTROENTEROLOGY | Facility: CLINIC | Age: 70
End: 2020-12-15
Payer: MEDICARE

## 2020-12-15 LAB
SARS-COV-2 RNA SPEC QL NAA+PROBE: NOT DETECTED
SPECIMEN SOURCE: NORMAL

## 2020-12-15 NOTE — ANESTHESIA PREPROCEDURE EVALUATION
Anesthesia Pre-Procedure Evaluation    Patient: Ifrah Huitron   MRN: [id number] : 1950          Preoperative Diagnosis: Special screening for malignant neoplasms, colon [Z12.11]    Procedure(s):  COLONOSCOPY    Past Medical History:   Diagnosis Date     Arthritis      Thyroid disease     removed pituitary gland     Past Surgical History:   Procedure Laterality Date     ENT SURGERY       HERNIA REPAIR         Anesthesia Evaluation     . Pt has had prior anesthetic. Type: General    No history of anesthetic complications          ROS/MED HX    ENT/Pulmonary:  - neg pulmonary ROS     Neurologic:  - neg neurologic ROS     Cardiovascular:  - neg cardiovascular ROS   (+) ----. : . . . :. . Previous cardiac testing date:results:date: results:ECG reviewed date:19 results:Sinus Rhythm   WITHIN NORMAL LIMITS date: results:          METS/Exercise Tolerance:  >4 METS   Hematologic:  - neg hematologic  ROS       Musculoskeletal:   (+) arthritis,  other musculoskeletal- DDD; Lumbago      GI/Hepatic:  - neg GI/hepatic ROS       Renal/Genitourinary:     (+) Nephrolithiasis ,       Endo:     (+) thyroid problem hypothyroidism Thyroid disease - Other Hypopituitarism, .      Psychiatric:     (+) psychiatric history anxiety      Infectious Disease:  - neg infectious disease ROS       Malignancy:      - no malignancy   Other:    - neg other ROS                      Physical Exam  Normal systems: cardiovascular, pulmonary and dental    Airway   Mallampati: II  TM distance: >3 FB  Neck ROM: full    Dental     Cardiovascular   Rhythm and rate: regular and normal      Pulmonary    breath sounds clear to auscultation            Lab Results   Component Value Date    WBC 8.0 2019    HGB 15.8 2019    HCT 48.9 2019     2019     2019    POTASSIUM 3.5 2019    CHLORIDE 106 2019    CO2 30 2019    BUN 12 2019    CR 0.94 2019    GLC 90 2019    COTY 8.9 2019  "   ALBUMIN 4.0 05/23/2019    PROTTOTAL 7.1 05/23/2019    ALT 23 05/23/2019    AST 22 05/23/2019    ALKPHOS 104 05/23/2019    BILITOTAL 0.6 05/23/2019       Preop Vitals  BP Readings from Last 3 Encounters:   05/23/19 127/77   05/23/19 (!) 152/104    Pulse Readings from Last 3 Encounters:   05/23/19 82   05/23/19 94      Resp Readings from Last 3 Encounters:   05/23/19 10   05/23/19 16    SpO2 Readings from Last 3 Encounters:   05/23/19 99%   05/23/19 98%      Temp Readings from Last 1 Encounters:   05/23/19 36.8  C (98.3  F) (Oral)    Ht Readings from Last 1 Encounters:   05/23/19 1.689 m (5' 6.5\")      Wt Readings from Last 1 Encounters:   05/23/19 75.8 kg (167 lb)    Estimated body mass index is 26.55 kg/m  as calculated from the following:    Height as of 5/23/19: 1.689 m (5' 6.5\").    Weight as of 5/23/19: 75.8 kg (167 lb).       Anesthesia Plan      History & Physical Review  History and physical reviewed and following examination; no interval change.    ASA Status:  2 .    NPO Status:  > 8 hours    Plan for General with Propofol induction. Maintenance will be TIVA.             Postoperative Care      Consents  Anesthetic plan, risks, benefits and alternatives discussed with:  Patient..                 JEVON Allan CRNA  "

## 2020-12-17 ENCOUNTER — ANESTHESIA (OUTPATIENT)
Dept: GASTROENTEROLOGY | Facility: CLINIC | Age: 70
End: 2020-12-17
Payer: MEDICARE

## 2020-12-17 ENCOUNTER — HOSPITAL ENCOUNTER (OUTPATIENT)
Facility: CLINIC | Age: 70
Discharge: HOME | End: 2020-12-17
Attending: SURGERY | Admitting: SURGERY
Payer: MEDICARE

## 2020-12-17 VITALS
HEART RATE: 71 BPM | TEMPERATURE: 98.5 F | HEIGHT: 67 IN | RESPIRATION RATE: 16 BRPM | WEIGHT: 170 LBS | SYSTOLIC BLOOD PRESSURE: 132 MMHG | BODY MASS INDEX: 26.68 KG/M2 | DIASTOLIC BLOOD PRESSURE: 89 MMHG | OXYGEN SATURATION: 98 %

## 2020-12-17 LAB — COLONOSCOPY: NORMAL

## 2020-12-17 PROCEDURE — G0121 COLON CA SCRN NOT HI RSK IND: HCPCS | Performed by: SURGERY

## 2020-12-17 PROCEDURE — 250N000009 HC RX 250: Performed by: NURSE ANESTHETIST, CERTIFIED REGISTERED

## 2020-12-17 PROCEDURE — 258N000003 HC RX IP 258 OP 636: Performed by: SURGERY

## 2020-12-17 PROCEDURE — 250N000009 HC RX 250: Performed by: SURGERY

## 2020-12-17 PROCEDURE — 250N000011 HC RX IP 250 OP 636: Performed by: SURGERY

## 2020-12-17 PROCEDURE — 250N000011 HC RX IP 250 OP 636: Performed by: NURSE ANESTHETIST, CERTIFIED REGISTERED

## 2020-12-17 PROCEDURE — 370N000001 HC ANESTHESIA TECHNICAL FEE, 1ST 30 MIN: Performed by: SURGERY

## 2020-12-17 PROCEDURE — 45378 DIAGNOSTIC COLONOSCOPY: CPT | Performed by: SURGERY

## 2020-12-17 RX ORDER — LIDOCAINE HYDROCHLORIDE 10 MG/ML
INJECTION, SOLUTION INFILTRATION; PERINEURAL PRN
Status: DISCONTINUED | OUTPATIENT
Start: 2020-12-17 | End: 2020-12-17

## 2020-12-17 RX ORDER — LIDOCAINE 40 MG/G
CREAM TOPICAL
Status: DISCONTINUED | OUTPATIENT
Start: 2020-12-17 | End: 2020-12-17 | Stop reason: HOSPADM

## 2020-12-17 RX ORDER — SODIUM CHLORIDE, SODIUM LACTATE, POTASSIUM CHLORIDE, CALCIUM CHLORIDE 600; 310; 30; 20 MG/100ML; MG/100ML; MG/100ML; MG/100ML
INJECTION, SOLUTION INTRAVENOUS CONTINUOUS
Status: DISCONTINUED | OUTPATIENT
Start: 2020-12-17 | End: 2020-12-17 | Stop reason: HOSPADM

## 2020-12-17 RX ORDER — PROPOFOL 10 MG/ML
INJECTION, EMULSION INTRAVENOUS PRN
Status: DISCONTINUED | OUTPATIENT
Start: 2020-12-17 | End: 2020-12-17

## 2020-12-17 RX ORDER — ONDANSETRON 2 MG/ML
4 INJECTION INTRAMUSCULAR; INTRAVENOUS
Status: COMPLETED | OUTPATIENT
Start: 2020-12-17 | End: 2020-12-17

## 2020-12-17 RX ORDER — PROPOFOL 10 MG/ML
INJECTION, EMULSION INTRAVENOUS CONTINUOUS PRN
Status: DISCONTINUED | OUTPATIENT
Start: 2020-12-17 | End: 2020-12-17

## 2020-12-17 RX ADMIN — PROPOFOL 200 MCG/KG/MIN: 10 INJECTION, EMULSION INTRAVENOUS at 10:43

## 2020-12-17 RX ADMIN — LIDOCAINE HYDROCHLORIDE: 10 INJECTION, SOLUTION EPIDURAL; INFILTRATION; INTRACAUDAL; PERINEURAL at 10:22

## 2020-12-17 RX ADMIN — ONDANSETRON 4 MG: 2 INJECTION INTRAMUSCULAR; INTRAVENOUS at 10:22

## 2020-12-17 RX ADMIN — SODIUM CHLORIDE, POTASSIUM CHLORIDE, SODIUM LACTATE AND CALCIUM CHLORIDE: 600; 310; 30; 20 INJECTION, SOLUTION INTRAVENOUS at 10:21

## 2020-12-17 RX ADMIN — LIDOCAINE HYDROCHLORIDE 40 MG: 10 INJECTION, SOLUTION INFILTRATION; PERINEURAL at 10:43

## 2020-12-17 RX ADMIN — PROPOFOL 100 MG: 10 INJECTION, EMULSION INTRAVENOUS at 10:43

## 2020-12-17 ASSESSMENT — MIFFLIN-ST. JEOR: SCORE: 1471.8

## 2020-12-17 NOTE — ANESTHESIA POSTPROCEDURE EVALUATION
Patient: Ifrah Huitron    Procedure(s):  COLONOSCOPY    Diagnosis:Special screening for malignant neoplasms, colon [Z12.11]  Diagnosis Additional Information: No value filed.    Anesthesia Type:  General    Note:  Anesthesia Post Evaluation    Patient location during evaluation: Phase 2 and Bedside  Patient participation: Able to fully participate in evaluation  Level of consciousness: awake and alert  Pain management: adequate  Airway patency: patent  Cardiovascular status: acceptable  Respiratory status: acceptable  Hydration status: acceptable  PONV: none     Anesthetic complications: None          Last vitals:  Vitals:    12/17/20 0956 12/17/20 1100   BP: (!) 125/92 123/80   Pulse: 109 85   Resp: 20 16   Temp: 36.9  C (98.5  F)    SpO2: 99% 98%         Electronically Signed By: Nigel Hoffman CRNA, APRN CRNA  December 17, 2020  11:17 AM

## 2020-12-17 NOTE — ANESTHESIA CARE TRANSFER NOTE
Patient: Ifrah Huitron    Procedure(s):  COLONOSCOPY    Diagnosis: Special screening for malignant neoplasms, colon [Z12.11]  Diagnosis Additional Information: No value filed.    Anesthesia Type:   General     Note:  Airway :Nasal Cannula  Patient transferred to:Phase II  Handoff Report: Identifed the Patient, Identified the Reponsible Provider, Reviewed the pertinent medical history, Discussed the surgical course, Reviewed Intra-OP anesthesia mangement and issues during anesthesia, Set expectations for post-procedure period and Allowed opportunity for questions and acknowledgement of understanding      Vitals: (Last set prior to Anesthesia Care Transfer)    CRNA VITALS  12/17/2020 1024 - 12/17/2020 1054      12/17/2020             Pulse:  82    Ht Rate:  91    SpO2:  98 %                Electronically Signed By: Nigel Hoffman CRNA, APRN CRNA  December 17, 2020  10:54 AM

## 2020-12-17 NOTE — H&P
"70 year old year old male here for colonoscopy for screening.    Patient Active Problem List   Diagnosis     Calculus of kidney     Degeneration of lumbar or lumbosacral intervertebral disc     Eczema     Epidural lipomatosis     TUYET (generalized anxiety disorder)     Hypopituitarism (H)     Hypothyroidism     Osteoarthritis of spine with radiculopathy, lumbar region     Pituitary macroadenoma (H)     Rosacea     Chronic lower back pain       Past Medical History:   Diagnosis Date     Arthritis      Thyroid disease     removed pituitary gland       Past Surgical History:   Procedure Laterality Date     ENT SURGERY       [facility]PA[facility]         [facility]    No current outpatient medications on file.       Allergies   Allergen Reactions     Penicillins Other (See Comments) and Hives     swelling, hives         Pt reports that he has never smoked. He has never used smokeless tobacco. He reports current alcohol use. He reports that he does not use drugs.    Exam:  BP (!) 125/92 (BP Location: Left arm)   Pulse 109   Temp 98.5  F (36.9  C) (Oral)   Resp 20   Ht 1.689 m (5' 6.5\")   Wt 77.1 kg (170 lb)   SpO2 99%   BMI 27.03 kg/m      Awake, Alert OX3  Lungs - CTA bilaterally  CV - RRR, no murmurs, distal pulses intact  Abd - soft, non-distended, non-tender, +BS  Extr - No cyanosis or edema    A/P 70 year old year old male in need of colonoscopy for screening. Risks, benefits, alternatives, and complications were discussed including the possibility of perforation and the patient agreed to proceed    Ken Camacho MD     "

## 2021-02-17 DIAGNOSIS — Z11.59 ENCOUNTER FOR SCREENING FOR OTHER VIRAL DISEASES: ICD-10-CM

## 2021-03-08 DIAGNOSIS — Z11.59 ENCOUNTER FOR SCREENING FOR OTHER VIRAL DISEASES: ICD-10-CM

## 2021-03-08 LAB
SARS-COV-2 RNA RESP QL NAA+PROBE: NORMAL
SPECIMEN SOURCE: NORMAL

## 2021-03-08 PROCEDURE — U0003 INFECTIOUS AGENT DETECTION BY NUCLEIC ACID (DNA OR RNA); SEVERE ACUTE RESPIRATORY SYNDROME CORONAVIRUS 2 (SARS-COV-2) (CORONAVIRUS DISEASE [COVID-19]), AMPLIFIED PROBE TECHNIQUE, MAKING USE OF HIGH THROUGHPUT TECHNOLOGIES AS DESCRIBED BY CMS-2020-01-R: HCPCS | Performed by: OPHTHALMOLOGY

## 2021-03-08 PROCEDURE — U0005 INFEC AGEN DETEC AMPLI PROBE: HCPCS | Performed by: OPHTHALMOLOGY

## 2021-03-09 ENCOUNTER — ANESTHESIA EVENT (OUTPATIENT)
Dept: SURGERY | Facility: CLINIC | Age: 71
End: 2021-03-09
Payer: MEDICARE

## 2021-03-09 LAB
LABORATORY COMMENT REPORT: NORMAL
SARS-COV-2 RNA RESP QL NAA+PROBE: NEGATIVE
SPECIMEN SOURCE: NORMAL

## 2021-03-09 NOTE — H&P
[facility]  [facility]  [address]  [facility] [address]  [phone]  Dept: [phone]    OPHTHALMOLOGY PRE-OPERATIVE  HISTORY AND PHYSICAL    DATE OF H/P:  2/15/2021    DATE OF SURGERY:  March 10, 2021  PROCEDURE:  Procedure(s):  Cataract removal with implant., Right Eye  LENS IMPLANT:  ZCB00 +7.50  REFRACTIVE GOAL:  PL Sph  SURGEON:  Jamir Mac MD    ANESTHESIA:  TOPICAL / MAC    OR CASE REQUIREMENTS:  High myopia.    DEMOGRAPHICS:  Demographic Information on Ifrah Huitron:    Ifrah Huitron  Gender: male  : 1950  [address] [place] [id number] [phone] (home)     Medical Record: [id number]  Social Security Number: [id number]  Pharmacy: [facility] [address] [facility]  Primary Care Provider: Sukhdev Kohler    Parent's names are: Data Unavailable (mother) and Data Unavailable (father).    Insurance: Payor: MEDICARE / Plan: MEDICARE / Product Type: Medicare /     OCULAR HISTORY:  Cataracts, each eye.  High Myopia.    HISTORIES:  Past Medical History:   Diagnosis Date     Arthritis      Thyroid disease     removed pituitary gland       Past Surgical History:   Procedure Laterality Date     COLONOSCOPY N/A 2020    Procedure: COLONOSCOPY;  Surgeon: Ken Camacho MD;  Location: [facility]     ENT SURGERY       HERNIA REPAIR         Family History   Problem Relation Age of Onset     Lupus Mother      ALS Father      Rheumatoid Arthritis Sister        Social History     Tobacco Use     Smoking status: Never Smoker     Smokeless tobacco: Never Used   Substance Use Topics     Alcohol use: Yes     Comment: rare       MEDICATIONS:  No current facility-administered medications for this encounter.      Current Outpatient Medications   Medication Sig     cholecalciferol (VITAMIN D-1000 MAX ST) 1000 units TABS Take 1,000 Units by mouth daily      hydrocortisone (CORTEF) 10 MG tablet Take 15 mg in the morning and 10  mg in the afternoon     levothyroxine (SYNTHROID/LEVOTHROID) 75 MCG tablet Take 75 mcg by mouth every morning      minocycline (MINOCIN/DYNACIN) 50 MG capsule Take 50 mg by mouth daily      triamcinolone (KENALOG) 0.1 % external cream Apply topically 2 times daily      fluocinonide (LIDEX) 0.05 % external solution Apply topically 2 times daily as needed      valACYclovir (VALTREX) 1000 mg tablet Take 1 tablet (1,000 mg) by mouth 3 times daily for 7 days       ALLERGIES:     Allergies   Allergen Reactions     Penicillins Other (See Comments) and Hives     swelling, hives         PERTINENT SYSTEMS REVIEW:    1. No - Do you have a history of heart attack, stroke, stent, bypass or surgery on an artery in the head, neck, heart or legs?  2. No - Do you ever have any pain or discomfort in your chest?  3. No - Do you have a history of  Heart Failure?  4. No - Are you troubled by shortness of breath when walking: On the level, up a slight hill or at night?  5. No - Do you currently have a cold, bronchitis or other respiratory infection?  6. No - Do you have a cough, shortness of breath or wheezing?  7. No - Do you sometimes get pains in the calves of your legs when you walk?  8. No - Do you or anyone in your family have previous history of blood clots?  9. No - Do you or does anyone in your family have a serious bleeding problem such as prolonged bleeding following surgeries or cuts?  10. No - Have you ever had problems with anemia or been told to take iron pills?  11. No - Have you had any abnormal blood loss such as black, tarry or bloody stools, or abnormal vaginal bleeding?  12. No - Have you ever had a blood transfusion?  13. No - Have you or any of your relatives ever had problems with anesthesia?  14. No - Do you have sleep apnea, excessive snoring or daytime drowsiness?  15. No - Do you have any prosthetic heart valves?  16. No - Do you have prosthetic joints?    EXAMINATION:  Vitals were reviewed  Vison:  Va, right  - 20/50, left - 20/30;   BAT, right - 20/100, left - 20/80;  HEENT:  Cataract, otherwise unremarkable.  LUNGS:  Clear  CV:  Regular rate and rhythm without murmur  ABD:  Soft and nontender  NEURO:  Alert and nonfocal    IMPRESSION:  Patient cleared for ophthalmic surgery.  Low risk with monitored, light sedation.  I have assessed the patient's DVT risk, and no additional orders necessary.    PLAN:  Procedure(s):  Cataract removal with implant, Right Eye      Jamir Mac MD

## 2021-03-09 NOTE — ANESTHESIA PREPROCEDURE EVALUATION
Anesthesia Pre-Procedure Evaluation    Patient: Ifrah Huitron   MRN: [id number] : 1950          Preoperative Diagnosis: Special screening for malignant neoplasms, colon [Z12.11]    Procedure(s):  COLONOSCOPY    Past Medical History:   Diagnosis Date     Arthritis      Thyroid disease     removed pituitary gland     Past Surgical History:   Procedure Laterality Date     COLONOSCOPY N/A 2020    Procedure: COLONOSCOPY;  Surgeon: Ken Camacho MD;  Location: [facility]     ENT SURGERY       HERNIA REPAIR         Anesthesia Evaluation     . Pt has had prior anesthetic. Type: General.    No history of anesthetic complications          ROS/MED HX    ENT/Pulmonary:  - neg pulmonary ROS     Neurologic:  - neg neurologic ROS     Cardiovascular:  - neg cardiovascular ROS   (+) -----Previous cardiac testing   Echo: Date: Results:    Stress Test: Date: Results:    ECG Reviewed: Date: 19 Results:  Sinus Rhythm   WITHIN NORMAL LIMITS  Cath: Date: Results:        METS/Exercise Tolerance:  >4 METS   Hematologic:  - neg hematologic  ROS       Musculoskeletal:   (+) arthritis, other musculoskeletal- DDD; Lumbago,       GI/Hepatic:  - neg GI/hepatic ROS       Renal/Genitourinary:     (+) Nephrolithiasis ,       Endo:     (+) thyroid problem, hypothyroidism Thyroid disease - Other Hypopituitarism,       Psychiatric:     (+) psychiatric history anxiety       Infectious Disease:  - neg infectious disease ROS       Malignancy:       Other:    - neg other ROS                      Physical Exam  Normal systems: cardiovascular, pulmonary and dental    Airway   Mallampati: II  TM distance: >3 FB  Neck ROM: full    Dental     Cardiovascular   Rhythm and rate: regular and normal      Pulmonary    breath sounds clear to auscultation            Lab Results   Component Value Date    WBC 8.0 2019    HGB 15.8 2019    HCT 48.9 2019     2019     2019    POTASSIUM 3.5 2019    CHLORIDE 106  "05/23/2019    CO2 30 05/23/2019    BUN 12 05/23/2019    CR 0.94 05/23/2019    GLC 90 05/23/2019    COTY 8.9 05/23/2019    ALBUMIN 4.0 05/23/2019    PROTTOTAL 7.1 05/23/2019    ALT 23 05/23/2019    AST 22 05/23/2019    ALKPHOS 104 05/23/2019    BILITOTAL 0.6 05/23/2019       Preop Vitals  BP Readings from Last 3 Encounters:   12/17/20 132/89   05/23/19 127/77   05/23/19 (!) 152/104    Pulse Readings from Last 3 Encounters:   12/17/20 71   05/23/19 82   05/23/19 94      Resp Readings from Last 3 Encounters:   12/17/20 16   05/23/19 10   05/23/19 16    SpO2 Readings from Last 3 Encounters:   12/17/20 98%   05/23/19 99%   05/23/19 98%      Temp Readings from Last 1 Encounters:   12/17/20 36.9  C (98.5  F) (Oral)    Ht Readings from Last 1 Encounters:   12/17/20 1.689 m (5' 6.5\")      Wt Readings from Last 1 Encounters:   12/17/20 77.1 kg (170 lb)    Estimated body mass index is 27.03 kg/m  as calculated from the following:    Height as of 12/17/20: 1.689 m (5' 6.5\").    Weight as of 12/17/20: 77.1 kg (170 lb).       Anesthesia Plan     ASA Status:  3    H&P reviewed: Unable to attach H&P to encounter due to EHR limitations. H&P Update: appropriate H&P reviewed, patient examined. No interval changes since H&P (within 30 days).     NPO Status: > 8 hours  Anesthesia Plan Type Discussed: MAC  Justification for MAC: Deep or markedly invasive procedure (G8)      Postoperative Care      Consents  Anesthetic plan, risks, benefits and alternatives discussed with:  Patient..                 JEVON Condon CRNA    Anesthesia Evaluation   Pt has had prior anesthetic. Type: General.    No history of anesthetic complications       ROS/MED HX  ENT/Pulmonary:  - neg pulmonary ROS     Neurologic:  - neg neurologic ROS     Cardiovascular:  - neg cardiovascular ROS   (+) -----Previous cardiac testing   Echo: Date: Results:    Stress Test: Date: Results:    ECG Reviewed: Date: 5/23/19 Results:  Sinus Rhythm   WITHIN NORMAL " LIMITS  Cath: Date: Results:      METS/Exercise Tolerance: >4 METS    Hematologic:  - neg hematologic  ROS     Musculoskeletal:   (+) arthritis, other musculoskeletal- DDD; Lumbago,     GI/Hepatic:  - neg GI/hepatic ROS     Renal/Genitourinary:     (+) Nephrolithiasis ,     Endo:     (+) thyroid problem, hypothyroidism Thyroid disease - Other Hypopituitarism,     Psychiatric/Substance Use:     (+) psychiatric history anxiety     Infectious Disease:  - neg infectious disease ROS     Malignancy:  - neg malignancy ROS     Other:  - neg other ROS            Physical Exam    Airway        Mallampati: II   TM distance: >3 FB   Neck ROM: full     Respiratory Devices and Support         Dental  no notable dental history         Cardiovascular   cardiovascular exam normal       Rhythm and rate: regular and normal     Pulmonary   pulmonary exam normal        breath sounds clear to auscultation             Anesthesia Plan    ASA Status:  3      Anesthesia Type: MAC.     - Reason for MAC: Deep or markedly invasive procedure (G8)              Consents    Anesthesia Plan(s) and associated risks, benefits, and realistic alternatives discussed. Questions answered and patient/representative(s) expressed understanding.     - Discussed with:  Patient         Postoperative Care            Comments:         H&P reviewed: Unable to attach H&P to encounter due to EHR limitations. H&P Update: appropriate H&P reviewed, patient examined. No interval changes since H&P (within 30 days).

## 2021-03-10 ENCOUNTER — HOSPITAL ENCOUNTER (OUTPATIENT)
Facility: CLINIC | Age: 71
Discharge: HOME | End: 2021-03-10
Attending: OPHTHALMOLOGY | Admitting: OPHTHALMOLOGY
Payer: MEDICARE

## 2021-03-10 ENCOUNTER — ANESTHESIA (OUTPATIENT)
Dept: SURGERY | Facility: CLINIC | Age: 71
End: 2021-03-10
Payer: MEDICARE

## 2021-03-10 VITALS
SYSTOLIC BLOOD PRESSURE: 134 MMHG | HEIGHT: 67 IN | TEMPERATURE: 98.3 F | WEIGHT: 163 LBS | OXYGEN SATURATION: 96 % | BODY MASS INDEX: 25.58 KG/M2 | RESPIRATION RATE: 16 BRPM | HEART RATE: 66 BPM | DIASTOLIC BLOOD PRESSURE: 86 MMHG

## 2021-03-10 PROCEDURE — 360N000007 HC CATARACT SURGICAL PACKAGE: Performed by: OPHTHALMOLOGY

## 2021-03-10 PROCEDURE — V2632 POST CHMBR INTRAOCULAR LENS: HCPCS | Performed by: OPHTHALMOLOGY

## 2021-03-10 PROCEDURE — 761N000008 HC RECOVERY CATRACT PACKAGE: Performed by: OPHTHALMOLOGY

## 2021-03-10 PROCEDURE — 370N000004 HC ANESTHESIA CATARACT PACKAGE: Performed by: OPHTHALMOLOGY

## 2021-03-10 PROCEDURE — 250N000009 HC RX 250: Performed by: NURSE ANESTHETIST, CERTIFIED REGISTERED

## 2021-03-10 PROCEDURE — 250N000011 HC RX IP 250 OP 636: Performed by: NURSE ANESTHETIST, CERTIFIED REGISTERED

## 2021-03-10 PROCEDURE — 258N000003 HC RX IP 258 OP 636: Performed by: NURSE ANESTHETIST, CERTIFIED REGISTERED

## 2021-03-10 PROCEDURE — 250N000009 HC RX 250: Performed by: OPHTHALMOLOGY

## 2021-03-10 PROCEDURE — 250N000011 HC RX IP 250 OP 636: Performed by: OPHTHALMOLOGY

## 2021-03-10 DEVICE — IMPLANTABLE DEVICE: Type: IMPLANTABLE DEVICE | Site: EYE | Status: FUNCTIONAL

## 2021-03-10 RX ORDER — PROPARACAINE HYDROCHLORIDE 5 MG/ML
SOLUTION/ DROPS OPHTHALMIC PRN
Status: DISCONTINUED | OUTPATIENT
Start: 2021-03-10 | End: 2021-03-10 | Stop reason: HOSPADM

## 2021-03-10 RX ORDER — NALOXONE HYDROCHLORIDE 0.4 MG/ML
0.2 INJECTION, SOLUTION INTRAMUSCULAR; INTRAVENOUS; SUBCUTANEOUS
Status: DISCONTINUED | OUTPATIENT
Start: 2021-03-10 | End: 2021-03-10 | Stop reason: HOSPADM

## 2021-03-10 RX ORDER — SODIUM CHLORIDE, SODIUM LACTATE, POTASSIUM CHLORIDE, CALCIUM CHLORIDE 600; 310; 30; 20 MG/100ML; MG/100ML; MG/100ML; MG/100ML
INJECTION, SOLUTION INTRAVENOUS CONTINUOUS
Status: DISCONTINUED | OUTPATIENT
Start: 2021-03-10 | End: 2021-03-10 | Stop reason: HOSPADM

## 2021-03-10 RX ORDER — BALANCED SALT SOLUTION 6.4; .75; .48; .3; 3.9; 1.7 MG/ML; MG/ML; MG/ML; MG/ML; MG/ML; MG/ML
SOLUTION OPHTHALMIC PRN
Status: DISCONTINUED | OUTPATIENT
Start: 2021-03-10 | End: 2021-03-10 | Stop reason: HOSPADM

## 2021-03-10 RX ORDER — NALOXONE HYDROCHLORIDE 0.4 MG/ML
0.4 INJECTION, SOLUTION INTRAMUSCULAR; INTRAVENOUS; SUBCUTANEOUS
Status: DISCONTINUED | OUTPATIENT
Start: 2021-03-10 | End: 2021-03-10 | Stop reason: HOSPADM

## 2021-03-10 RX ORDER — LIDOCAINE HYDROCHLORIDE 20 MG/ML
JELLY TOPICAL PRN
Status: DISCONTINUED | OUTPATIENT
Start: 2021-03-10 | End: 2021-03-10 | Stop reason: HOSPADM

## 2021-03-10 RX ORDER — LIDOCAINE 40 MG/G
CREAM TOPICAL
Status: DISCONTINUED | OUTPATIENT
Start: 2021-03-10 | End: 2021-03-10 | Stop reason: HOSPADM

## 2021-03-10 RX ORDER — TROPICAMIDE 10 MG/ML
1 SOLUTION/ DROPS OPHTHALMIC
Status: COMPLETED | OUTPATIENT
Start: 2021-03-10 | End: 2021-03-10

## 2021-03-10 RX ADMIN — TROPICAMIDE 1 DROP: 10 SOLUTION/ DROPS OPHTHALMIC at 09:32

## 2021-03-10 RX ADMIN — SODIUM CHLORIDE, POTASSIUM CHLORIDE, SODIUM LACTATE AND CALCIUM CHLORIDE 1000 ML: 600; 310; 30; 20 INJECTION, SOLUTION INTRAVENOUS at 09:33

## 2021-03-10 RX ADMIN — CYCLOPENTOLATE HYDROCHLORIDE AND PHENYLEPHRINE HYDROCHLORIDE 1 DROP: 2; 10 SOLUTION/ DROPS OPHTHALMIC at 09:45

## 2021-03-10 RX ADMIN — CYCLOPENTOLATE HYDROCHLORIDE AND PHENYLEPHRINE HYDROCHLORIDE 1 DROP: 2; 10 SOLUTION/ DROPS OPHTHALMIC at 09:41

## 2021-03-10 RX ADMIN — LIDOCAINE HYDROCHLORIDE 0.1 ML: 10 INJECTION, SOLUTION EPIDURAL; INFILTRATION; INTRACAUDAL; PERINEURAL at 09:34

## 2021-03-10 RX ADMIN — CYCLOPENTOLATE HYDROCHLORIDE AND PHENYLEPHRINE HYDROCHLORIDE 1 DROP: 2; 10 SOLUTION/ DROPS OPHTHALMIC at 09:32

## 2021-03-10 RX ADMIN — TROPICAMIDE 1 DROP: 10 SOLUTION/ DROPS OPHTHALMIC at 09:45

## 2021-03-10 RX ADMIN — TROPICAMIDE 1 DROP: 10 SOLUTION/ DROPS OPHTHALMIC at 09:41

## 2021-03-10 RX ADMIN — MIDAZOLAM 2 MG: 1 INJECTION INTRAMUSCULAR; INTRAVENOUS at 09:46

## 2021-03-10 ASSESSMENT — MIFFLIN-ST. JEOR: SCORE: 1440.05

## 2021-03-10 NOTE — ANESTHESIA CARE TRANSFER NOTE
Patient: Ifrah A Lauzon    Procedure(s):  Cataract removal with implant.    Diagnosis: Cataract [H26.9]  Diagnosis Additional Information: No value filed.    Anesthesia Type:   MAC     Note:    Oropharynx: spontaneously breathing  Level of Consciousness: awake  Oxygen Supplementation: room air    Independent Airway: airway patency satisfactory and stable  Dentition: dentition unchanged  Vital Signs Stable: post-procedure vital signs reviewed and stable  Report to RN Given: handoff report given  Patient transferred to: Phase II    Handoff Report: Identifed the Patient, Identified the Reponsible Provider, Reviewed the pertinent medical history, Discussed the surgical course, Reviewed Intra-OP anesthesia mangement and issues during anesthesia, Set expectations for post-procedure period and Allowed opportunity for questions and acknowledgement of understanding      Vitals: (Last set prior to Anesthesia Care Transfer)  CRNA VITALS  3/10/2021 0936 - 3/10/2021 1006      3/10/2021             Pulse:  72    SpO2:  97 %        Electronically Signed By: Nigel Hoffman CRNA, APRN CRNA  March 10, 2021  10:06 AM

## 2021-03-10 NOTE — ANESTHESIA POSTPROCEDURE EVALUATION
Patient: Ifrah Huitron    Procedure(s):  Cataract removal with implant.    Diagnosis:Cataract [H26.9]  Diagnosis Additional Information: No value filed.    Anesthesia Type:  MAC    Note:  Disposition: Outpatient   Postop Pain Control: Uneventful            Sign Out: Well controlled pain   PONV: No   Neuro/Psych: Uneventful            Sign Out: Acceptable/Baseline neuro status   Airway/Respiratory: Uneventful            Sign Out: Acceptable/Baseline resp. status   CV/Hemodynamics: Uneventful            Sign Out: Acceptable CV status   Other NRE: NONE   DID A NON-ROUTINE EVENT OCCUR? No         Last vitals:  Vitals:    03/10/21 0906 03/10/21 1009   BP: (!) 166/94 134/86   Pulse: 69 66   Resp: 16    Temp: 36.7  C (98.1  F) 36.8  C (98.3  F)   SpO2: 99% 96%       Last vitals prior to Anesthesia Care Transfer:  CRNA VITALS  3/10/2021 0936 - 3/10/2021 1036      3/10/2021             Pulse:  72    SpO2:  97 %          Electronically Signed By: Nigel Hoffman CRNA, APRN CRNA  March 10, 2021  10:40 AM

## 2021-03-10 NOTE — OP NOTE
OPHTHALMOLOGY OPERATIVE NOTE    PATIENT: Ifrah Huitron  DATE OF SURGERY: 3/10/2021  PREOPERATIVE DIAGNOSIS:  Senile Nuclear Cataract, Right eye  POSTOPERATIVE DIAGNOSIS:  Senile Nuclear Cataract, Right eye  OPERATIVE PROCEDURE:  Phacoemulsification with placement of intraocular lens  SURGEON:  Jamir Mac MD  ANESTHESIA:  Topical / MAC  EBL:  None  SPECIMENS:  None  COMPLICATIONS:  None    PROCEDURE:  The patient was brought to the operating room at [facility].  The right eye was prepped and draped in the usual fashion for cataract surgery.  A wire lid speculum was inserted.  A super sharp blade was used to make a paracentesis at the 11 O'clock position.  The super sharp blade was used to make a partial thickness temporal groove, which was 3 mm in length.  0.8 mL of non-preserved epi-Shugarcaine was injected into the anterior chamber.  Viscoelastic was used to inflate the anterior chamber through a cannula.  A 2.5 mm microkeratome was used to make a temporal clear corneal incision in a two-plane fashion.  A cystotome needle and forceps were used to make a capsulorrhexis.  Hydrodissection and hydrodelineation were performed with Balance Salt Solution.  The lens was then phacoemulsified and removed without complications.  The cortical material was removed with bimanual irrigation and aspiration.  The capsular bag was filled with viscoelastic.  A posterior chamber intraocular lens, preselected and recorded, was folded and inserted into the capsular bag.  The viscoelastic was removed with the irrigation and aspiration tip.  Balanced Salt Solution with Vigamox, 150mg/0.1mL, was used to refill the anterior chamber.  The wounds were checked for water tightness and required no suture.  The wire lid speculum was removed.  The patient's right eye was cleaned and a drop of each post-operative drop was placed, followed by a saucedo shield.  The patient tolerated the procedure well, and there were  no complications.      Jamir Mac MD

## 2021-03-18 ENCOUNTER — IMMUNIZATION (OUTPATIENT)
Dept: FAMILY MEDICINE | Facility: CLINIC | Age: 71
End: 2021-03-18
Payer: MEDICARE

## 2021-03-18 PROCEDURE — 91301 PR COVID VAC MODERNA 100 MCG/0.5 ML IM: CPT

## 2021-03-18 PROCEDURE — 0011A PR COVID VAC MODERNA 100 MCG/0.5 ML IM: CPT

## 2021-03-21 DIAGNOSIS — Z11.59 ENCOUNTER FOR SCREENING FOR OTHER VIRAL DISEASES: ICD-10-CM

## 2021-03-23 ENCOUNTER — ANCILLARY PROCEDURE (OUTPATIENT)
Dept: GENERAL RADIOLOGY | Facility: CLINIC | Age: 71
End: 2021-03-23
Attending: FAMILY MEDICINE
Payer: MEDICARE

## 2021-03-23 ENCOUNTER — OFFICE VISIT (OUTPATIENT)
Dept: FAMILY MEDICINE | Facility: CLINIC | Age: 71
End: 2021-03-23
Payer: MEDICARE

## 2021-03-23 VITALS
HEART RATE: 63 BPM | SYSTOLIC BLOOD PRESSURE: 136 MMHG | WEIGHT: 161.4 LBS | TEMPERATURE: 96.3 F | BODY MASS INDEX: 25.33 KG/M2 | HEIGHT: 67 IN | RESPIRATION RATE: 14 BRPM | DIASTOLIC BLOOD PRESSURE: 80 MMHG | OXYGEN SATURATION: 97 %

## 2021-03-23 DIAGNOSIS — R91.8 MASS OF LEFT LUNG: ICD-10-CM

## 2021-03-23 DIAGNOSIS — R07.2 SUBSTERNAL CHEST PAIN: Primary | ICD-10-CM

## 2021-03-23 PROCEDURE — 71046 X-RAY EXAM CHEST 2 VIEWS: CPT | Performed by: RADIOLOGY

## 2021-03-23 PROCEDURE — 99214 OFFICE O/P EST MOD 30 MIN: CPT | Performed by: FAMILY MEDICINE

## 2021-03-23 PROCEDURE — 93000 ELECTROCARDIOGRAM COMPLETE: CPT | Performed by: FAMILY MEDICINE

## 2021-03-23 RX ORDER — KETOROLAC TROMETHAMINE 5 MG/ML
SOLUTION OPHTHALMIC
Status: ON HOLD | COMMUNITY
Start: 2021-02-05 | End: 2021-04-29

## 2021-03-23 RX ORDER — PREDNISOLONE ACETATE 10 MG/ML
SUSPENSION/ DROPS OPHTHALMIC
Status: ON HOLD | COMMUNITY
Start: 2021-02-05 | End: 2021-04-29

## 2021-03-23 ASSESSMENT — MIFFLIN-ST. JEOR: SCORE: 1432.8

## 2021-03-23 NOTE — PATIENT INSTRUCTIONS
There is an undiagnosed lung nodule on the chest xray on the left lung.  CT scan of chest is ordered per radiologist recommendation to further assess this finding.    To schedule the CT scan, call [phone].    If you have progressive chest pain, persistent or worsening breathing difficulty, palpitation, lightheadedness or other concerning symptoms, you should be brought to the ER.  Patient Education     Uncertain Causes of Chest Pain    Chest pain can happen for a number of reasons. Sometimes the cause can't be determined. If your condition does not seem serious, and your pain does not appear to be coming from your heart, your healthcare provider may recommend watching it closely. Sometimes the signs of a serious problem take more time to appear. Many problems not related to your heart can cause chest pain. These include:    Musculoskeletal. Costochondritis is an inflammation of the tissues around the ribs that can occur from trauma or overuse injuries, or a strain of the muscles of the chest wall    Respiratory. Pneumonia, collapsed lung (pneumothorax), or inflammation of the lining of the chest and lungs (pleurisy)    Gastrointestinal. Esophageal reflux, heartburn, ulcers, or gallbladder disease    Anxiety and panic disorders    Nerve compression and inflammation    Rare miscellaneous problems such as aortic aneurysm (a swelling of the large artery coming out of the heart) or pulmonary embolism (a blood clot in the lungs)  Home care  After your visit, follow these recommendations:    Rest today and avoid strenuous activity.    Take any prescribed medicine as directed.    Be aware of any recurrent chest pain and notice any changes  Follow-up care  Follow up with your healthcare provider if you do not start to feel better within 24 hours, or as advised.  Call 911  Call 911 if any of these occur:    A change in the type of pain: if it feels different, becomes more severe, lasts longer, or begins to spread into  your shoulder, arm, neck, jaw or back    Shortness of breath or increased pain with breathing    Weakness, dizziness, or fainting    Rapid heart beat    Crushing sensation in your chest  When to seek medical advice  Call your healthcare provider right away if any of the following occur:    Cough with dark colored sputum (phlegm) or blood    Fever of 100.4 F (38 C) or higher, or as directed by your healthcare provider    Swelling, pain or redness in one leg  [employer] last reviewed this educational content on 5/1/2018 2000-2020 [employer]. All rights reserved. This information is not intended as a substitute for professional medical care. Always follow your healthcare professional's instructions.           Patient Education     Noncardiac Chest Pain    Based on your visit today, the healthcare provider doesn t know what is causing your chest pain. In most cases, people who come to the emergency room with chest pain don t have a problem with their heart. Instead, the pain is caused by other conditions. It's important for the healthcare team to be sure you are not having a life-threatening cause for chest pain such as:     Heart attack    Blood clot in the lungs    Collapsed lung    Ruptured esophagus    Tearing of the aorta  Once these major causes have been ruled out, you may have further evaluation for nonheart causes of chest pain. These may be problems with the lungs, muscles, bones, digestive tract, nerves, or mental health. They include:     Inflammation around the lungs (pleurisy)    Collapsed lung (pneumothorax)    Fluid around the lungs (pleural effusion)    Lung cancer (a rare cause of chest pain)    Inflamed cartilage between the ribs (costochondritis)    Fibromyalgia    Rheumatoid arthritis    Chest wall strain    Reflux    Stomach ulcer    Spasms of the esophagus    Gall stones    Gallbladder inflammation    Panic or anxiety attacks    Emotional distress  Your condition doesn t seem  serious. And your pain doesn t seem to be coming from your heart. But sometimes the signs of a serious problem take more time to appear. Watch for the warning signs listed below.   Home care  Follow these guidelines when caring for yourself at home:     Rest today and don't do any strenuous activity.    Take any prescribed medicine as directed.  Follow-up care  Follow up with your healthcare provider, or as advised, if you don t start to feel better in 24 hours.   Call 911  Call 911 if any of these occur:     A change in the type of pain: if it feels different, becomes more severe, lasts longer, or begins to spread into your shoulder, arm, neck, jaw or back    Shortness of breath or increased pain with breathing    Weakness, dizziness, or fainting    Rapid heart beat    Crushing sensation in your chest  When to seek medical advice  Call your healthcare provider right away if any of these occur:     Cough with dark colored sputum (phlegm) or blood    Fever of 100.4 F (38 C) or higher, or as directed by your healthcare provider    Swelling, pain or redness in one leg  [employer] last reviewed this educational content on 6/1/2019 2000-2020 [employer]. All rights reserved. This information is not intended as a substitute for professional medical care. Always follow your healthcare professional's instructions.           Patient Education     Pulmonary Nodule  A pulmonary nodule is small area of abnormal tissue in the lung. It is usually found on an X-ray taken for other reasons. It is a single spot (lesion) up to about an inch in size, surrounded by normal lung tissue.  Most nodules are not cancerous (benign). However, a nodule could be an early stage of lung cancer. Or it may be a sign of cancer that has spread from another part of the body. When a nodule is found on a chest X-ray, further testing is needed to determine if it is benign or cancerous (malignant). To give your healthcare provider more  information about the nodule, you may have one or more of these tests:    Comparison of a new X-ray to earlier X-rays    Chest CT scan    PET scan    Bronchoscopy (a procedure that allows the healthcare provider to see the air passages inside the lung)    Needle biopsy    Lung surgery or minimally invasive lung surgery such as thoracoscopy, a procedure that lets the surgeon take a portion of lung tissue through small incisions between the ribs.  Test results    If your nodule is benign, continued follow-up over the next 2 years is usually advised.    If tests do not determine whether your nodule is benign or malignant, surgery may be advised.    If tests show that the nodule is definitely malignant, surgery will probably be advised. Often surgery will be recommended without a biopsy, if the other testing strongly suggests that the nodule is a cancer.  The best survival rates from lung cancer occur when the original tumor is small (less than 1 inch). Follow your healthcare provider's advice on the timing of further testing. Prompt treatment gives the best chance of curing lung cancer.  Prevention  Smoking remains one of the biggest risk factors for lung cancer. If you smoke, it is essential that you quit to lower your risk of lung cancer. Talk to your healthcare provider about things that can help you quit, including medicines and support groups. See the following websites for more information:    www.smokefree.gov    www.quitnet.com  Home care  Home care will depend on the diagnosis and the treatment used. Most people with a pulmonary nodule have no symptoms. If no special home care is required, you may return to your usual activities and diet.  Follow-up care  Follow up with your healthcare provider, or as advised.  More information about lung cancer is available from these resources:    American Lung Association: 122.576.9239, www.lung.org    National Cancer [place]: 806.869.8283, www.cancer.gov  When to seek  medical advice  Call your healthcare provider right away if any of these occur:    Fever of 100.4 F (38 C) or higher, or as directed by your healthcare provider    Unintended weight change  Call 911  Call 911 if any of these occur:    Coughing up blood    Chest pain or shortness of breath  [employer] last reviewed this educational content on 6/1/2018 2000-2020 [employer]. All rights reserved. This information is not intended as a substitute for professional medical care. Always follow your healthcare professional's instructions.

## 2021-03-23 NOTE — PROGRESS NOTES
Assessment & Plan     Substernal chest pain  Unclear etiology. No distress today.  EKG not suspect for acute ischemia.   CXR showed non-enlarged cardiac silhouette, and no infiltratess, but with suspicious nodule on left lung. Unknown if this is causing the chest pain, but it is located within the general area of the symptom reported by patient.  Patient was advised of radiology recommendations to katelynn green with a CT scan. Ordered test.  Activity as tolerated.   Patient was advised in detail on reasons to call 911 or be brought to the ER.  - XR Chest 2 Views  - EKG 12-lead complete w/read - Clinics  - CT Chest w Contrast    Mass of left lung  See above.  Patient was advised of findings of CXR and of radiologist recommendations.  - CT Chest w Contrast    Patient Instructions   There is an undiagnosed lung nodule on the chest xray on the left lung.  CT scan of chest is ordered per radiologist recommendation to further assess this finding.    To schedule the CT scan, call [phone].    If you have progressive chest pain, persistent or worsening breathing difficulty, palpitation, lightheadedness or other concerning symptoms, you should be brought to the ER.  Patient Education     Uncertain Causes of Chest Pain    Chest pain can happen for a number of reasons. Sometimes the cause can't be determined. If your condition does not seem serious, and your pain does not appear to be coming from your heart, your healthcare provider may recommend watching it closely. Sometimes the signs of a serious problem take more time to appear. Many problems not related to your heart can cause chest pain. These include:    Musculoskeletal. Costochondritis is an inflammation of the tissues around the ribs that can occur from trauma or overuse injuries, or a strain of the muscles of the chest wall    Respiratory. Pneumonia, collapsed lung (pneumothorax), or inflammation of the lining of the chest and lungs  (pleurisy)    Gastrointestinal. Esophageal reflux, heartburn, ulcers, or gallbladder disease    Anxiety and panic disorders    Nerve compression and inflammation    Rare miscellaneous problems such as aortic aneurysm (a swelling of the large artery coming out of the heart) or pulmonary embolism (a blood clot in the lungs)  Home care  After your visit, follow these recommendations:    Rest today and avoid strenuous activity.    Take any prescribed medicine as directed.    Be aware of any recurrent chest pain and notice any changes  Follow-up care  Follow up with your healthcare provider if you do not start to feel better within 24 hours, or as advised.  Call 911  Call 911 if any of these occur:    A change in the type of pain: if it feels different, becomes more severe, lasts longer, or begins to spread into your shoulder, arm, neck, jaw or back    Shortness of breath or increased pain with breathing    Weakness, dizziness, or fainting    Rapid heart beat    Crushing sensation in your chest  When to seek medical advice  Call your healthcare provider right away if any of the following occur:    Cough with dark colored sputum (phlegm) or blood    Fever of 100.4 F (38 C) or higher, or as directed by your healthcare provider    Swelling, pain or redness in one leg  Jimmie last reviewed this educational content on 5/1/2018 2000-2020 [employer]. All rights reserved. This information is not intended as a substitute for professional medical care. Always follow your healthcare professional's instructions.           Patient Education     Noncardiac Chest Pain    Based on your visit today, the healthcare provider doesn t know what is causing your chest pain. In most cases, people who come to the emergency room with chest pain don t have a problem with their heart. Instead, the pain is caused by other conditions. It's important for the healthcare team to be sure you are not having a life-threatening cause for  chest pain such as:     Heart attack    Blood clot in the lungs    Collapsed lung    Ruptured esophagus    Tearing of the aorta  Once these major causes have been ruled out, you may have further evaluation for nonheart causes of chest pain. These may be problems with the lungs, muscles, bones, digestive tract, nerves, or mental health. They include:     Inflammation around the lungs (pleurisy)    Collapsed lung (pneumothorax)    Fluid around the lungs (pleural effusion)    Lung cancer (a rare cause of chest pain)    Inflamed cartilage between the ribs (costochondritis)    Fibromyalgia    Rheumatoid arthritis    Chest wall strain    Reflux    Stomach ulcer    Spasms of the esophagus    Gall stones    Gallbladder inflammation    Panic or anxiety attacks    Emotional distress  Your condition doesn t seem serious. And your pain doesn t seem to be coming from your heart. But sometimes the signs of a serious problem take more time to appear. Watch for the warning signs listed below.   Home care  Follow these guidelines when caring for yourself at home:     Rest today and don't do any strenuous activity.    Take any prescribed medicine as directed.  Follow-up care  Follow up with your healthcare provider, or as advised, if you don t start to feel better in 24 hours.   Call 911  Call 911 if any of these occur:     A change in the type of pain: if it feels different, becomes more severe, lasts longer, or begins to spread into your shoulder, arm, neck, jaw or back    Shortness of breath or increased pain with breathing    Weakness, dizziness, or fainting    Rapid heart beat    Crushing sensation in your chest  When to seek medical advice  Call your healthcare provider right away if any of these occur:     Cough with dark colored sputum (phlegm) or blood    Fever of 100.4 F (38 C) or higher, or as directed by your healthcare provider    Swelling, pain or redness in one leg  Jimmie last reviewed this educational content on  6/1/2019 2000-2020 [employer]. All rights reserved. This information is not intended as a substitute for professional medical care. Always follow your healthcare professional's instructions.           Patient Education     Pulmonary Nodule  A pulmonary nodule is small area of abnormal tissue in the lung. It is usually found on an X-ray taken for other reasons. It is a single spot (lesion) up to about an inch in size, surrounded by normal lung tissue.  Most nodules are not cancerous (benign). However, a nodule could be an early stage of lung cancer. Or it may be a sign of cancer that has spread from another part of the body. When a nodule is found on a chest X-ray, further testing is needed to determine if it is benign or cancerous (malignant). To give your healthcare provider more information about the nodule, you may have one or more of these tests:    Comparison of a new X-ray to earlier X-rays    Chest CT scan    PET scan    Bronchoscopy (a procedure that allows the healthcare provider to see the air passages inside the lung)    Needle biopsy    Lung surgery or minimally invasive lung surgery such as thoracoscopy, a procedure that lets the surgeon take a portion of lung tissue through small incisions between the ribs.  Test results    If your nodule is benign, continued follow-up over the next 2 years is usually advised.    If tests do not determine whether your nodule is benign or malignant, surgery may be advised.    If tests show that the nodule is definitely malignant, surgery will probably be advised. Often surgery will be recommended without a biopsy, if the other testing strongly suggests that the nodule is a cancer.  The best survival rates from lung cancer occur when the original tumor is small (less than 1 inch). Follow your healthcare provider's advice on the timing of further testing. Prompt treatment gives the best chance of curing lung cancer.  Prevention  Smoking remains one of  the biggest risk factors for lung cancer. If you smoke, it is essential that you quit to lower your risk of lung cancer. Talk to your healthcare provider about things that can help you quit, including medicines and support groups. See the following websites for more information:    www.smokefree.gov    www.quitnet.com  Home care  Home care will depend on the diagnosis and the treatment used. Most people with a pulmonary nodule have no symptoms. If no special home care is required, you may return to your usual activities and diet.  Follow-up care  Follow up with your healthcare provider, or as advised.  More information about lung cancer is available from these resources:    American Lung Association: 891.515.7510, www.lung.org    [employer]: 538.838.4985, www.cancer.gov  When to seek medical advice  Call your healthcare provider right away if any of these occur:    Fever of 100.4 F (38 C) or higher, or as directed by your healthcare provider    Unintended weight change  Call 911  Call 911 if any of these occur:    Coughing up blood    Chest pain or shortness of breath  LOAG last reviewed this educational content on 6/1/2018 2000-2020 [employer]. All rights reserved. This information is not intended as a substitute for professional medical care. Always follow your healthcare professional's instructions.               Return in about 1 week (around 3/30/2021), or if symptoms worsen or fail to improve, for Virtual visit for test result.    Sukhdev Kohler MD  [facility] is a 70 year [facility] who presents for the following health issues   Chief Complaint   Patient presents with     Musculoskeletal Problem     Pt having pain in his sternum, left shoulder into the back.       HPI     Musculoskeletal problem/pain  Onset/Duration: 4 months ago  Description  Location: sternum, shoulder into the back - left  Joint Swelling: no  Redness: no  Pain:  YES- sternum painful all the time, shoulder is intermittant, hard to sleep at night if having pain, every other night or every 2 nights; patient states pressing on the sternum reproduces pain  Warmth: no  Patient states only pain pesent today is the sternal pain. Denies pain on left shoulder at this time.  Intensity:  4 with sternum/10, 1-7/10 with shoulder, worse at night  Progression of Symptoms:  worsening, more frequent and waxing and waning  Accompanying signs and symptoms:   Fevers: no  Numbness/tingling/weakness: a couple months ago maybe both thumbs were painful, went away after a couple days, had been working on building a garage  Not associated with dyspnea or food intake.  Patient said he has been exercising more to try to lose weight, and he has lost some over the last few months.  History  Trauma to the area: no  Recent illness:  no  Previous similar problem: no  Previous evaluation:  no  Precipitating or alleviating factors:  Aggravating factors include: certain positions, put pressure on that area, leaning over from sitting position, shoulder is worse at night   Therapies tried and outcome: back and hussein helps some    Reviewed medical, social and family hx as below.    Patient Active Problem List   Diagnosis     Calculus of kidney     Degeneration of lumbar or lumbosacral intervertebral disc     Eczema     Epidural lipomatosis     TUYET (generalized anxiety disorder)     Hypopituitarism (H)     Hypothyroidism     Osteoarthritis of spine with radiculopathy, lumbar region     Pituitary macroadenoma (H)     Rosacea     Chronic lower back pain     Past Surgical History:   Procedure Laterality Date     COLONOSCOPY N/A 12/17/2020    Procedure: COLONOSCOPY;  Surgeon: Ken Camacho MD;  Location: [facility]     ENT SURGERY       HERNIA REPAIR       PHACOEMULSIFICATION WITH STANDARD INTRAOCULAR LENS IMPLANT Right 3/10/2021    Procedure: Cataract removal with implant.;  Surgeon: Jamir Mac MD;   "Location: [facility]       Social History     Tobacco Use     Smoking status: Never Smoker     Smokeless tobacco: Never Used   Substance Use Topics     Alcohol use: Yes     Comment: rare     Family History   Problem Relation Age of Onset     Lupus Mother      ALS Father      Rheumatoid Arthritis Sister          Current Outpatient Medications   Medication Sig Dispense Refill     cholecalciferol (VITAMIN D-1000 MAX ST) 1000 units TABS Take 1,000 Units by mouth daily        fluocinonide (LIDEX) 0.05 % external solution Apply topically 2 times daily as needed        hydrocortisone (CORTEF) 10 MG tablet Take 15 mg in the morning and 10 mg in the afternoon       ketorolac (ACULAR) 0.5 % ophthalmic solution Place 1 drop into affected eye three times daily. Start 2 Days Prior to Surgery       levothyroxine (SYNTHROID/LEVOTHROID) 75 MCG tablet Take 75 mcg by mouth every morning        minocycline (MINOCIN/DYNACIN) 50 MG capsule Take 50 mg by mouth daily        prednisoLONE acetate (PRED FORTE) 1 % ophthalmic suspension Place 1 drop into affected eye three times daily. Start After the surgery.       triamcinolone (KENALOG) 0.1 % external cream Apply topically 2 times daily        Allergies   Allergen Reactions     Penicillins Other (See Comments) and Hives     swelling, hives         Review of Systems   Constitutional, HEENT, cardiovascular, pulmonary, GI, , musculoskeletal, neuro, skin, endocrine and psych systems are negative, except as otherwise noted.      Objective    /80   Pulse 63   Temp 96.3  F (35.7  C) (Tympanic)   Resp 14   Ht 1.689 m (5' 6.5\")   Wt 73.2 kg (161 lb 6.4 oz)   SpO2 97%   BMI 25.66 kg/m    Body mass index is 25.66 kg/m .  Physical Exam   GENERAL: borderline overweight, alert and no distress, ambulatory w/o assist  NECK: no tenderness, no adenopathy,  Thyroid not enlarged  RESP: lungs clear to auscultation - no rales, no rhonchi, no wheezes  CV: regular rates and rhythm, normal S1 S2, no S3 " or S4 and no murmur, no click or rub  MS: extremities- no gross deformities noted, no edema  SKIN: no suspicious lesions, no rashes  NEURO: strength and tone- normal, sensory exam- grossly normal, mentation- intact, speech- normal, reflexes- symmetric  CHEST: equal chest expansion, no tenderness on palpation, no deformity    Xray - Reviewed and interpreted by me.  Normal cardiac silhousette; no infiltrates, oval shaped nodule on lateral aspect of  left lung   EKG - Reviewed and interpreted by me appears normal, NSR, normal axis, normal intervals, no acute ST/T changes c/w ischemia, no LVH by voltage criteria, unchanged from previous tracings  Results for orders placed or performed in visit on 03/23/21   XR Chest 2 Views     Status: None    Narrative    CHEST TWO VIEWS 3/23/2021 5:03 PM     HISTORY: Rule out cardiomegaly. Unexplained substernal chest pain.  Substernal chest pain.    COMPARISON: CT 5/23/2019    FINDINGS: 2.9 cm pulmonary nodule projected over the left upper lobe.  Right lung is clear. No pneumothorax or pleural effusion. Heart size  normal.       Impression    IMPRESSION: 2.9 cm left-sided pulmonary nodule not seen on prior CT.  Recommend CT scan of the chest with contrast.     SANDRA DANIEL MD

## 2021-03-26 ENCOUNTER — HOSPITAL ENCOUNTER (OUTPATIENT)
Dept: CT IMAGING | Facility: CLINIC | Age: 71
Discharge: HOME | End: 2021-03-26
Attending: FAMILY MEDICINE | Admitting: FAMILY MEDICINE
Payer: MEDICARE

## 2021-03-26 ENCOUNTER — TELEPHONE (OUTPATIENT)
Dept: FAMILY MEDICINE | Facility: CLINIC | Age: 71
End: 2021-03-26

## 2021-03-26 DIAGNOSIS — R91.8 MASS OF LEFT LUNG: ICD-10-CM

## 2021-03-26 DIAGNOSIS — R07.2 SUBSTERNAL CHEST PAIN: ICD-10-CM

## 2021-03-26 LAB
CREAT BLD-MCNC: 0.8 MG/DL (ref 0.66–1.25)
GFR SERPL CREATININE-BSD FRML MDRD: >90 ML/MIN/{1.73_M2}
RADIOLOGIST FLAGS: ABNORMAL

## 2021-03-26 PROCEDURE — 82565 ASSAY OF CREATININE: CPT

## 2021-03-26 PROCEDURE — 250N000011 HC RX IP 250 OP 636: Performed by: RADIOLOGY

## 2021-03-26 PROCEDURE — 71260 CT THORAX DX C+: CPT | Mod: ME

## 2021-03-26 PROCEDURE — 250N000009 HC RX 250: Performed by: RADIOLOGY

## 2021-03-26 RX ORDER — IOPAMIDOL 755 MG/ML
80 INJECTION, SOLUTION INTRAVASCULAR ONCE
Status: COMPLETED | OUTPATIENT
Start: 2021-03-26 | End: 2021-03-26

## 2021-03-26 RX ADMIN — IOPAMIDOL 80 ML: 755 INJECTION, SOLUTION INTRAVENOUS at 12:22

## 2021-03-26 RX ADMIN — SODIUM CHLORIDE 80 ML: 9 INJECTION, SOLUTION INTRAVENOUS at 12:23

## 2021-03-26 NOTE — TELEPHONE ENCOUNTER
"Imaging has called now and reported CT chest is \"likely metastatic disease\". Pt saw Dr Kohler 3 days ago. This looks to be a new diagnosis.  Myra Taylor RN    "

## 2021-03-27 ENCOUNTER — HEALTH MAINTENANCE LETTER (OUTPATIENT)
Age: 71
End: 2021-03-27

## 2021-03-29 ENCOUNTER — HOSPITAL ENCOUNTER (OUTPATIENT)
Dept: CT IMAGING | Facility: CLINIC | Age: 71
Discharge: HOME | End: 2021-03-29
Attending: FAMILY MEDICINE | Admitting: FAMILY MEDICINE
Payer: MEDICARE

## 2021-03-29 ENCOUNTER — OFFICE VISIT (OUTPATIENT)
Dept: FAMILY MEDICINE | Facility: CLINIC | Age: 71
End: 2021-03-29
Payer: MEDICARE

## 2021-03-29 VITALS
RESPIRATION RATE: 14 BRPM | HEART RATE: 78 BPM | TEMPERATURE: 97.5 F | OXYGEN SATURATION: 97 % | DIASTOLIC BLOOD PRESSURE: 82 MMHG | WEIGHT: 163.4 LBS | HEIGHT: 67 IN | SYSTOLIC BLOOD PRESSURE: 138 MMHG | BODY MASS INDEX: 25.65 KG/M2

## 2021-03-29 DIAGNOSIS — C79.89 SECONDARY MALIGNANT NEOPLASM OF OTHER SPECIFIED SITES (H): ICD-10-CM

## 2021-03-29 DIAGNOSIS — C78.02 MALIGNANT NEOPLASM METASTATIC TO LEFT LUNG (H): Primary | ICD-10-CM

## 2021-03-29 DIAGNOSIS — C78.02 MALIGNANT NEOPLASM METASTATIC TO LEFT LUNG (H): ICD-10-CM

## 2021-03-29 DIAGNOSIS — Z12.5 SCREENING FOR PROSTATE CANCER: ICD-10-CM

## 2021-03-29 LAB
ALBUMIN SERPL-MCNC: 3.3 G/DL (ref 3.4–5)
ALP SERPL-CCNC: 126 U/L (ref 40–150)
ALT SERPL W P-5'-P-CCNC: 19 U/L (ref 0–70)
ANION GAP SERPL CALCULATED.3IONS-SCNC: 4 MMOL/L (ref 3–14)
AST SERPL W P-5'-P-CCNC: 17 U/L (ref 0–45)
BASOPHILS # BLD AUTO: 0.1 10E9/L (ref 0–0.2)
BASOPHILS NFR BLD AUTO: 0.6 %
BILIRUB SERPL-MCNC: 0.4 MG/DL (ref 0.2–1.3)
BUN SERPL-MCNC: 14 MG/DL (ref 7–30)
CALCIUM SERPL-MCNC: 9.4 MG/DL (ref 8.5–10.1)
CHLORIDE SERPL-SCNC: 104 MMOL/L (ref 94–109)
CO2 SERPL-SCNC: 29 MMOL/L (ref 20–32)
CREAT SERPL-MCNC: 0.83 MG/DL (ref 0.66–1.25)
DIFFERENTIAL METHOD BLD: NORMAL
EOSINOPHIL # BLD AUTO: 0.2 10E9/L (ref 0–0.7)
EOSINOPHIL NFR BLD AUTO: 2.3 %
ERYTHROCYTE [DISTWIDTH] IN BLOOD BY AUTOMATED COUNT: 13.2 % (ref 10–15)
GFR SERPL CREATININE-BSD FRML MDRD: 87 ML/MIN/{1.73_M2}
GLUCOSE SERPL-MCNC: 137 MG/DL (ref 70–99)
HCT VFR BLD AUTO: 47.1 % (ref 40–53)
HGB BLD-MCNC: 15 G/DL (ref 13.3–17.7)
LIPASE SERPL-CCNC: 79 U/L (ref 73–393)
LYMPHOCYTES # BLD AUTO: 1.7 10E9/L (ref 0.8–5.3)
LYMPHOCYTES NFR BLD AUTO: 18.2 %
MCH RBC QN AUTO: 28.5 PG (ref 26.5–33)
MCHC RBC AUTO-ENTMCNC: 31.8 G/DL (ref 31.5–36.5)
MCV RBC AUTO: 90 FL (ref 78–100)
MONOCYTES # BLD AUTO: 0.9 10E9/L (ref 0–1.3)
MONOCYTES NFR BLD AUTO: 9.9 %
NEUTROPHILS # BLD AUTO: 6.3 10E9/L (ref 1.6–8.3)
NEUTROPHILS NFR BLD AUTO: 69 %
PLATELET # BLD AUTO: 247 10E9/L (ref 150–450)
POTASSIUM SERPL-SCNC: 3.4 MMOL/L (ref 3.4–5.3)
PROT SERPL-MCNC: 7.1 G/DL (ref 6.8–8.8)
PSA SERPL-ACNC: 2.33 UG/L (ref 0–4)
RBC # BLD AUTO: 5.26 10E12/L (ref 4.4–5.9)
SODIUM SERPL-SCNC: 137 MMOL/L (ref 133–144)
TSH SERPL DL<=0.005 MIU/L-ACNC: 0.53 MU/L (ref 0.4–4)
WBC # BLD AUTO: 9.1 10E9/L (ref 4–11)

## 2021-03-29 PROCEDURE — G1004 CDSM NDSC: HCPCS

## 2021-03-29 PROCEDURE — 99214 OFFICE O/P EST MOD 30 MIN: CPT | Performed by: FAMILY MEDICINE

## 2021-03-29 PROCEDURE — G0103 PSA SCREENING: HCPCS | Performed by: FAMILY MEDICINE

## 2021-03-29 PROCEDURE — 250N000009 HC RX 250: Performed by: FAMILY MEDICINE

## 2021-03-29 PROCEDURE — 85025 COMPLETE CBC W/AUTO DIFF WBC: CPT | Performed by: FAMILY MEDICINE

## 2021-03-29 PROCEDURE — 83690 ASSAY OF LIPASE: CPT | Performed by: FAMILY MEDICINE

## 2021-03-29 PROCEDURE — 84443 ASSAY THYROID STIM HORMONE: CPT | Performed by: FAMILY MEDICINE

## 2021-03-29 PROCEDURE — 250N000011 HC RX IP 250 OP 636: Performed by: FAMILY MEDICINE

## 2021-03-29 PROCEDURE — 80053 COMPREHEN METABOLIC PANEL: CPT | Performed by: FAMILY MEDICINE

## 2021-03-29 PROCEDURE — 36415 COLL VENOUS BLD VENIPUNCTURE: CPT | Performed by: FAMILY MEDICINE

## 2021-03-29 RX ORDER — IOPAMIDOL 755 MG/ML
80 INJECTION, SOLUTION INTRAVASCULAR ONCE
Status: COMPLETED | OUTPATIENT
Start: 2021-03-29 | End: 2021-03-29

## 2021-03-29 RX ADMIN — SODIUM CHLORIDE 60 ML: 9 INJECTION, SOLUTION INTRAVENOUS at 18:11

## 2021-03-29 RX ADMIN — IOPAMIDOL 80 ML: 755 INJECTION, SOLUTION INTRAVENOUS at 18:11

## 2021-03-29 ASSESSMENT — MIFFLIN-ST. JEOR: SCORE: 1441.87

## 2021-03-29 NOTE — PATIENT INSTRUCTIONS
Schedule oncology consult.    Result of CT scan of abdomen will be called to you in 24 hours after it's done.    Still looking for primary site of cancer.

## 2021-03-29 NOTE — PROGRESS NOTES
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  Patient was advised he will need consilt with oncology, referral placed.  Discussed definitive treatment of his condition will depend on primary cancer site.   Obtain CT abdomen and pelvis w/ contrast to look for primary malig (liver, pancreas, intestine, kidney, lymph nodes). Consider brain imaging if no primary malignancy found.  Patient said his pain has decreased on the chest. He is okay using otc remedies for now.  Return precautions discussed and given to patient.     Patient Instructions   Schedule oncology consult.    Result of CT scan of abdomen will be called to you in 24 hours after it's done.    Still looking for primary site of cancer.          No follow-ups on file.    Sukhdev Kohler MD  [facility]    Viktoria Rg is a 70 year old who presents for the following health issues:  Chief Complaint   Patient presents with     Results     Pt here to discuss ct results from 3/26/21 of chest.     Received note today from on-call (3/26) provider this morning that he called patient that evening to inform of results.    HPI                                                                      IMPRESSION:  1. Multiple masses in the mediastinum, left hilar region, anterior  left pleural space, and left posterior subdiaphragmatic regions highly  suspicious for metastatic disease. Recommend CT of the abdomen and  pelvis to further  evaluate for primary source. The left  subdiaphragmatic lesion appears to be invading the posterior  diaphragm.  2. Multiple subcentimeter low-attenuation lesions within the liver are  too small to definitely characterize and are indeterminate.  3. Small left pleural effusion.      Patient Active Problem List   Diagnosis     Calculus of kidney     Degeneration of lumbar or lumbosacral intervertebral disc     Eczema     Epidural lipomatosis     TUYET (generalized anxiety disorder)     Hypopituitarism (H)     Hypothyroidism     Osteoarthritis of spine with radiculopathy, lumbar region     Pituitary macroadenoma (H)     Rosacea     Chronic lower back pain     Past Surgical History:   Procedure Laterality Date     COLONOSCOPY N/A 12/17/2020    Procedure: COLONOSCOPY;  Surgeon: Ken Camacho MD;  Location: [facility]     ENT SURGERY       HERNIA REPAIR       PHACOEMULSIFICATION WITH STANDARD INTRAOCULAR LENS IMPLANT Right 3/10/2021    Procedure: Cataract removal with implant.;  Surgeon: Jamir Mac MD;  Location: [facility]       Social History     Tobacco Use     Smoking status: Never Smoker     Smokeless tobacco: Never Used   Substance Use Topics     Alcohol use: Yes     Comment: rare     Family History   Problem Relation Age of Onset     Lupus Mother      ALS Father      Rheumatoid Arthritis Sister          Current Outpatient Medications   Medication Sig Dispense Refill     cholecalciferol (VITAMIN D-1000 MAX ST) 1000 units TABS Take 1,000 Units by mouth daily        fluocinonide (LIDEX) 0.05 % external solution Apply topically 2 times daily as needed        hydrocortisone (CORTEF) 10 MG tablet Take 15 mg in the morning and 10 mg in the afternoon       ketorolac (ACULAR) 0.5 % ophthalmic solution Place 1 drop into affected eye three times daily. Start 2 Days Prior to Surgery       levothyroxine (SYNTHROID/LEVOTHROID) 75 MCG tablet Take 75 mcg by mouth every morning        minocycline (MINOCIN/DYNACIN) 50 MG capsule  "Take 50 mg by mouth daily        omeprazole (PRILOSEC) 20 MG DR capsule Take 20 mg by mouth       prednisoLONE acetate (PRED FORTE) 1 % ophthalmic suspension Place 1 drop into affected eye three times daily. Start After the surgery.       triamcinolone (KENALOG) 0.1 % external cream Apply topically 2 times daily        Allergies   Allergen Reactions     Penicillins Other (See Comments) and Hives     swelling, hives        Review of Systems   Constitutional, HEENT, cardiovascular, pulmonary, GI, , musculoskeletal, neuro, skin, endocrine and psych systems are negative, except as otherwise noted.      Objective    /82   Pulse 78   Temp 97.5  F (36.4  C) (Tympanic)   Resp 14   Ht 1.689 m (5' 6.5\")   Wt 74.1 kg (163 lb 6.4 oz)   SpO2 97%   BMI 25.98 kg/m    Body mass index is 25.98 kg/m .  Physical Exam   GENERAL: alert and no distress, ambulatory w/o assist  NECK: no tenderness, no adenopathy,  Thyroid not enlarged  RESP: lungs clear to auscultation - no rales, no rhonchi, no wheezes  CV: regular rates and rhythm, no murmur  MS: no edema  SKIN: no jaundice, no suspicious lesions, no rashes  ABD: soft,  Nontender, no palpable mass; no hepatosplenomegaly    Results for orders placed or performed in visit on 03/29/21   CBC with platelets differential     Status: None   Result Value Ref Range    WBC 9.1 4.0 - 11.0 10e9/L    RBC Count 5.26 4.4 - 5.9 10e12/L    Hemoglobin 15.0 13.3 - 17.7 g/dL    Hematocrit 47.1 40.0 - 53.0 %    MCV 90 78 - 100 fl    MCH 28.5 26.5 - 33.0 pg    MCHC 31.8 31.5 - 36.5 g/dL    RDW 13.2 10.0 - 15.0 %    Platelet Count 247 150 - 450 10e9/L    % Neutrophils 69.0 %    % Lymphocytes 18.2 %    % Monocytes 9.9 %    % Eosinophils 2.3 %    % Basophils 0.6 %    Absolute Neutrophil 6.3 1.6 - 8.3 10e9/L    Absolute Lymphocytes 1.7 0.8 - 5.3 10e9/L    Absolute Monocytes 0.9 0.0 - 1.3 10e9/L    Absolute Eosinophils 0.2 0.0 - 0.7 10e9/L    Absolute Basophils 0.1 0.0 - 0.2 10e9/L    Diff Method " Automated Method

## 2021-03-30 ENCOUNTER — PATIENT OUTREACH (OUTPATIENT)
Dept: ONCOLOGY | Facility: CLINIC | Age: 71
End: 2021-03-30

## 2021-03-30 ENCOUNTER — ANESTHESIA EVENT (OUTPATIENT)
Dept: SURGERY | Facility: CLINIC | Age: 71
End: 2021-03-30
Payer: MEDICARE

## 2021-03-30 NOTE — ANESTHESIA PREPROCEDURE EVALUATION
Anesthesia Pre-Procedure Evaluation    Patient: Ifrah Huitron   MRN: [id number] : 1950          Preoperative Diagnosis: Special screening for malignant neoplasms, colon [Z12.11]    Procedure(s):  COLONOSCOPY    Past Medical History:   Diagnosis Date     Arthritis      Thyroid disease     removed pituitary gland     Past Surgical History:   Procedure Laterality Date     COLONOSCOPY N/A 2020    Procedure: COLONOSCOPY;  Surgeon: Ken Camacho MD;  Location: [facility]     ENT SURGERY       HERNIA REPAIR       PHACOEMULSIFICATION WITH STANDARD INTRAOCULAR LENS IMPLANT Right 3/10/2021    Procedure: Cataract removal with implant.;  Surgeon: Jamir Mac MD;  Location: [facility]       Anesthesia Evaluation     . Pt has had prior anesthetic. Type: General.    No history of anesthetic complications          ROS/MED HX    ENT/Pulmonary: Comment: Cataract        Neurologic:  - neg neurologic ROS     Cardiovascular:  - neg cardiovascular ROS   (+) -----Previous cardiac testing   Echo: Date: Results:    Stress Test: Date: Results:    ECG Reviewed: Date: 19 Results:  Sinus Rhythm   WITHIN NORMAL LIMITS  Cath: Date: Results:        METS/Exercise Tolerance:  >4 METS   Hematologic:  - neg hematologic  ROS       Musculoskeletal:   (+) arthritis, other musculoskeletal- DDD; Lumbago,       GI/Hepatic:  - neg GI/hepatic ROS       Renal/Genitourinary:     (+) Nephrolithiasis ,       Endo:     (+) thyroid problem, hypothyroidism Thyroid disease - Other Hypopituitarism,       Psychiatric:     (+) psychiatric history anxiety       Infectious Disease:  - neg infectious disease ROS       Malignancy:       Other:    - neg other ROS                      Physical Exam  Normal systems: cardiovascular, pulmonary and dental    Airway   Mallampati: II  TM distance: >3 FB  Neck ROM: full    Dental     Cardiovascular   Rhythm and rate: regular and normal      Pulmonary    breath sounds clear to auscultation            Lab Results  "  Component Value Date    WBC 9.1 03/29/2021    HGB 15.0 03/29/2021    HCT 47.1 03/29/2021     03/29/2021     03/29/2021    POTASSIUM 3.4 03/29/2021    CHLORIDE 104 03/29/2021    CO2 29 03/29/2021    BUN 14 03/29/2021    CR 0.83 03/29/2021     (H) 03/29/2021    COTY 9.4 03/29/2021    ALBUMIN 3.3 (L) 03/29/2021    PROTTOTAL 7.1 03/29/2021    ALT 19 03/29/2021    AST 17 03/29/2021    ALKPHOS 126 03/29/2021    BILITOTAL 0.4 03/29/2021    LIPASE 79 03/29/2021    TSH 0.53 03/29/2021       Preop Vitals  BP Readings from Last 3 Encounters:   03/29/21 138/82   03/23/21 136/80   03/10/21 134/86    Pulse Readings from Last 3 Encounters:   03/29/21 78   03/23/21 63   03/10/21 66      Resp Readings from Last 3 Encounters:   03/29/21 14   03/23/21 14   03/10/21 16    SpO2 Readings from Last 3 Encounters:   03/29/21 97%   03/23/21 97%   03/10/21 96%      Temp Readings from Last 1 Encounters:   03/29/21 36.4  C (97.5  F) (Tympanic)    Ht Readings from Last 1 Encounters:   03/29/21 1.689 m (5' 6.5\")      Wt Readings from Last 1 Encounters:   03/29/21 74.1 kg (163 lb 6.4 oz)    Estimated body mass index is 25.98 kg/m  as calculated from the following:    Height as of 3/29/21: 1.689 m (5' 6.5\").    Weight as of 3/29/21: 74.1 kg (163 lb 6.4 oz).       Anesthesia Plan     ASA Status:  3    H&P reviewed: Unable to attach H&P to encounter due to EHR limitations. H&P Update: appropriate H&P reviewed, patient examined. No interval changes since H&P (within 30 days).     NPO Status: > 8 hours  Anesthesia Plan Type Discussed: MAC  Justification for MAC: Deep or markedly invasive procedure (G8)      Postoperative Care      Consents  Anesthetic plan, risks, benefits and alternatives discussed with:  Patient..                 JEVON Redmond CRNA    Anesthesia Evaluation   Pt has had prior anesthetic. Type: General.    No history of anesthetic complications       ROS/MED HX  ENT/Pulmonary: Comment: Cataract      "   Neurologic:  - neg neurologic ROS     Cardiovascular:  - neg cardiovascular ROS   (+) -----Previous cardiac testing   Echo: Date: Results:    Stress Test: Date: Results:    ECG Reviewed: Date: 5/23/19 Results:  Sinus Rhythm   WITHIN NORMAL LIMITS  Cath: Date: Results:      METS/Exercise Tolerance: >4 METS    Hematologic:  - neg hematologic  ROS     Musculoskeletal:   (+) arthritis, other musculoskeletal- DDD; Lumbago,     GI/Hepatic:  - neg GI/hepatic ROS     Renal/Genitourinary:     (+) Nephrolithiasis ,     Endo:     (+) thyroid problem, hypothyroidism Thyroid disease - Other Hypopituitarism,     Psychiatric/Substance Use:     (+) psychiatric history anxiety     Infectious Disease:  - neg infectious disease ROS     Malignancy:  - neg malignancy ROS     Other:  - neg other ROS            Physical Exam    Airway        Mallampati: II   TM distance: >3 FB   Neck ROM: full     Respiratory Devices and Support         Dental  no notable dental history         Cardiovascular   cardiovascular exam normal       Rhythm and rate: regular and normal     Pulmonary   pulmonary exam normal        breath sounds clear to auscultation             Anesthesia Plan    ASA Status:  3      Anesthesia Type: MAC.     - Reason for MAC: Deep or markedly invasive procedure (G8)              Consents    Anesthesia Plan(s) and associated risks, benefits, and realistic alternatives discussed. Questions answered and patient/representative(s) expressed understanding.     - Discussed with:  Patient         Postoperative Care            Comments:         H&P reviewed: Unable to attach H&P to encounter due to EHR limitations. H&P Update: appropriate H&P reviewed, patient examined. No interval changes since H&P (within 30 days).

## 2021-03-30 NOTE — PROGRESS NOTES
I called pt's only listed phone# ([phone]) but his voicemail box is full so I could not leave a message. Will try again later.    Pt has urgent referral for Oncology; CT chest & CT Abd/Pelvis completed at [facility] show metastatic disease of unknown origin. Will need tissue biopsy and Oncology for further work up.     IMPRESSION:  1. Multiple masses in the mediastinum, left hilar region, anterior  left pleural space, and left posterior subdiaphragmatic regions highly  suspicious for metastatic disease. Recommend CT of the abdomen and  pelvis to further evaluate for primary source. The left  subdiaphragmatic lesion appears to be invading the posterior  diaphragm.  2. Multiple subcentimeter low-attenuation lesions within the liver are  too small to definitely characterize and are indeterminate.  3. Small left pleural effusion.    IMPRESSION:   1.  Large left upper quadrant mass/neoplasm appears to invade the left hemidiaphragm and most likely a metastatic lesion. This abuts, but does not appear to originate from the spleen. This would be amenable for percutaneous biopsy.  2.  Small left pleural effusion is likely reactive secondary to the mass.  3.  Hypodense liver lesions are unchanged and should represent cysts.  4.  Prostatic hypertrophy.               For information on Fall & Injury Prevention, visit [email or web address]

## 2021-03-30 NOTE — PROGRESS NOTES
Addendum 3/30: I was able to reach pt, he will check his VM account. He understands that we're working on his case and I will call back with appt options. Our Thoracic/lung Med oncs will need to add on. Pt is open to driving to [place] or video visit. I did advise that likely a biopsy is next step & we will call back with appt options.

## 2021-04-01 DIAGNOSIS — Z11.59 ENCOUNTER FOR SCREENING FOR OTHER VIRAL DISEASES: ICD-10-CM

## 2021-04-01 LAB
SARS-COV-2 RNA RESP QL NAA+PROBE: NORMAL
SPECIMEN SOURCE: NORMAL

## 2021-04-01 PROCEDURE — U0005 INFEC AGEN DETEC AMPLI PROBE: HCPCS | Performed by: OPHTHALMOLOGY

## 2021-04-01 PROCEDURE — U0003 INFECTIOUS AGENT DETECTION BY NUCLEIC ACID (DNA OR RNA); SEVERE ACUTE RESPIRATORY SYNDROME CORONAVIRUS 2 (SARS-COV-2) (CORONAVIRUS DISEASE [COVID-19]), AMPLIFIED PROBE TECHNIQUE, MAKING USE OF HIGH THROUGHPUT TECHNOLOGIES AS DESCRIBED BY CMS-2020-01-R: HCPCS | Performed by: OPHTHALMOLOGY

## 2021-04-05 ENCOUNTER — ANESTHESIA (OUTPATIENT)
Dept: SURGERY | Facility: CLINIC | Age: 71
End: 2021-04-05
Payer: MEDICARE

## 2021-04-05 ENCOUNTER — HOSPITAL ENCOUNTER (OUTPATIENT)
Facility: CLINIC | Age: 71
Discharge: HOME | End: 2021-04-05
Attending: OPHTHALMOLOGY | Admitting: OPHTHALMOLOGY
Payer: MEDICARE

## 2021-04-05 VITALS
OXYGEN SATURATION: 97 % | WEIGHT: 163 LBS | BODY MASS INDEX: 26.2 KG/M2 | TEMPERATURE: 98.1 F | HEIGHT: 66 IN | RESPIRATION RATE: 16 BRPM | SYSTOLIC BLOOD PRESSURE: 164 MMHG | DIASTOLIC BLOOD PRESSURE: 90 MMHG | HEART RATE: 60 BPM

## 2021-04-05 PROCEDURE — 250N000011 HC RX IP 250 OP 636: Performed by: NURSE ANESTHETIST, CERTIFIED REGISTERED

## 2021-04-05 PROCEDURE — 360N000007 HC CATARACT SURGICAL PACKAGE: Performed by: OPHTHALMOLOGY

## 2021-04-05 PROCEDURE — V2632 POST CHMBR INTRAOCULAR LENS: HCPCS | Performed by: OPHTHALMOLOGY

## 2021-04-05 PROCEDURE — 258N000003 HC RX IP 258 OP 636: Performed by: NURSE ANESTHETIST, CERTIFIED REGISTERED

## 2021-04-05 PROCEDURE — 250N000009 HC RX 250: Performed by: NURSE ANESTHETIST, CERTIFIED REGISTERED

## 2021-04-05 PROCEDURE — 250N000009 HC RX 250: Performed by: OPHTHALMOLOGY

## 2021-04-05 PROCEDURE — 370N000004 HC ANESTHESIA CATARACT PACKAGE: Performed by: OPHTHALMOLOGY

## 2021-04-05 PROCEDURE — 250N000011 HC RX IP 250 OP 636: Performed by: OPHTHALMOLOGY

## 2021-04-05 PROCEDURE — 761N000008 HC RECOVERY CATRACT PACKAGE: Performed by: OPHTHALMOLOGY

## 2021-04-05 DEVICE — EYE IMP IOL AMO PCL TECNIS ZCB00 8.5: Type: IMPLANTABLE DEVICE | Site: EYE | Status: FUNCTIONAL

## 2021-04-05 RX ORDER — LIDOCAINE 40 MG/G
CREAM TOPICAL
Status: DISCONTINUED | OUTPATIENT
Start: 2021-04-05 | End: 2021-04-05 | Stop reason: HOSPADM

## 2021-04-05 RX ORDER — SODIUM CHLORIDE, SODIUM LACTATE, POTASSIUM CHLORIDE, CALCIUM CHLORIDE 600; 310; 30; 20 MG/100ML; MG/100ML; MG/100ML; MG/100ML
INJECTION, SOLUTION INTRAVENOUS CONTINUOUS
Status: DISCONTINUED | OUTPATIENT
Start: 2021-04-05 | End: 2021-04-05 | Stop reason: HOSPADM

## 2021-04-05 RX ORDER — LIDOCAINE HYDROCHLORIDE 20 MG/ML
JELLY TOPICAL PRN
Status: DISCONTINUED | OUTPATIENT
Start: 2021-04-05 | End: 2021-04-05 | Stop reason: HOSPADM

## 2021-04-05 RX ORDER — PROPARACAINE HYDROCHLORIDE 5 MG/ML
SOLUTION/ DROPS OPHTHALMIC PRN
Status: DISCONTINUED | OUTPATIENT
Start: 2021-04-05 | End: 2021-04-05 | Stop reason: HOSPADM

## 2021-04-05 RX ORDER — TROPICAMIDE 10 MG/ML
1 SOLUTION/ DROPS OPHTHALMIC
Status: COMPLETED | OUTPATIENT
Start: 2021-04-05 | End: 2021-04-05

## 2021-04-05 RX ORDER — PROPOFOL 10 MG/ML
INJECTION, EMULSION INTRAVENOUS PRN
Status: DISCONTINUED | OUTPATIENT
Start: 2021-04-05 | End: 2021-04-05

## 2021-04-05 RX ORDER — BALANCED SALT SOLUTION 6.4; .75; .48; .3; 3.9; 1.7 MG/ML; MG/ML; MG/ML; MG/ML; MG/ML; MG/ML
SOLUTION OPHTHALMIC PRN
Status: DISCONTINUED | OUTPATIENT
Start: 2021-04-05 | End: 2021-04-05 | Stop reason: HOSPADM

## 2021-04-05 RX ADMIN — CYCLOPENTOLATE HYDROCHLORIDE AND PHENYLEPHRINE HYDROCHLORIDE 1 DROP: 2; 10 SOLUTION/ DROPS OPHTHALMIC at 10:59

## 2021-04-05 RX ADMIN — TROPICAMIDE 1 DROP: 10 SOLUTION/ DROPS OPHTHALMIC at 10:49

## 2021-04-05 RX ADMIN — CYCLOPENTOLATE HYDROCHLORIDE AND PHENYLEPHRINE HYDROCHLORIDE 1 DROP: 2; 10 SOLUTION/ DROPS OPHTHALMIC at 11:08

## 2021-04-05 RX ADMIN — CYCLOPENTOLATE HYDROCHLORIDE AND PHENYLEPHRINE HYDROCHLORIDE 1 DROP: 2; 10 SOLUTION/ DROPS OPHTHALMIC at 10:49

## 2021-04-05 RX ADMIN — SODIUM CHLORIDE, POTASSIUM CHLORIDE, SODIUM LACTATE AND CALCIUM CHLORIDE: 600; 310; 30; 20 INJECTION, SOLUTION INTRAVENOUS at 11:15

## 2021-04-05 RX ADMIN — TROPICAMIDE 1 DROP: 10 SOLUTION/ DROPS OPHTHALMIC at 11:08

## 2021-04-05 RX ADMIN — TROPICAMIDE 1 DROP: 10 SOLUTION/ DROPS OPHTHALMIC at 10:59

## 2021-04-05 RX ADMIN — LIDOCAINE HYDROCHLORIDE 0.1 ML: 10 INJECTION, SOLUTION EPIDURAL; INFILTRATION; INTRACAUDAL; PERINEURAL at 11:15

## 2021-04-05 RX ADMIN — PROPOFOL 35 MG: 10 INJECTION, EMULSION INTRAVENOUS at 11:47

## 2021-04-05 ASSESSMENT — MIFFLIN-ST. JEOR: SCORE: 1432.11

## 2021-04-05 NOTE — OP NOTE
CATARACT OPERATIVE NOTE    PATIENT: Ifrah Huitron  DATE OF SURGERY: 4/5/2021  PREOPERATIVE DIAGNOSIS:  Senile Nuclear Cataract, Left eye  POSTOPERATIVE DIAGNOSIS:  Senile Nuclear Cataract, Left eye  OPERATIVE PROCEDURE:  Phacoemulsification and placement of intraocular lens  SURGEON:  Jamir Mac MD  ANESTHESIA:  Topical / MAC  EBL:  None  SPECIMENS:  None  COMPLICATIONS:  None    PROCEDURE:  The patient was brought to the operating room at [facility].  The left eye was prepped and draped in the usual fashion for cataract surgery.  A wire lid speculum was inserted.  A super sharp blade was used to make a paracentesis at the 5 O'clock position.  The super sharp blade was used to make a partial thickness temporal groove, which was 3 mm in length.  0.8 mL of non-preserved epi-Shugarcaine was injected into the anterior chamber.  Viscoelastic was used to inflate the anterior chamber through a cannula.  A 2.5 mm microkeratome was used to make a temporal clear corneal incision in a two-plane fashion.  A cystotome needle and forceps were used to make a capsulorrhexis.  Hydrodissection and hydrodelineation were performed with Balance Salt Solution.  The lens was then phacoemulsified and removed without complications.  The cortical material was removed with bimanual irrigation and aspiration.  The capsular bag was filled with viscoelastic.  A posterior chamber intraocular lens, preselected and recorded, was folded and inserted into the capsular bag.  The viscoelastic was removed with the irrigation and aspiration tip.  Balanced Salt Solution with Vigamox, 150mg/0.1mL, was used to refill the anterior chamber.  The wounds were checked for water tightness and required no suture.  The wire lid speculum was removed.  The patient's left eye was cleaned and a drop of each post-operative drop was placed, followed by a saucedo shield.  The patient tolerated the procedure well, and there were no  complications.      Jamir Mac MD

## 2021-04-05 NOTE — ANESTHESIA CARE TRANSFER NOTE
Patient: Ifrah A Lauzon    Procedure(s):  Cataract removal with implant.    Diagnosis: Cataract [H26.9]  Diagnosis Additional Information: No value filed.    Anesthesia Type:   MAC     Note:    Oropharynx: oropharynx clear of all foreign objects  Level of Consciousness: awake  Oxygen Supplementation: room air    Independent Airway: airway patency satisfactory and stable  Dentition: dentition unchanged  Vital Signs Stable: post-procedure vital signs reviewed and stable  Report to RN Given: handoff report given  Patient transferred to: Phase II    Handoff Report: Identifed the Patient, Identified the Reponsible Provider, Reviewed the pertinent medical history, Discussed the surgical course, Reviewed Intra-OP anesthesia mangement and issues during anesthesia, Set expectations for post-procedure period and Allowed opportunity for questions and acknowledgement of understanding      Vitals: (Last set prior to Anesthesia Care Transfer)  CRNA VITALS  4/5/2021 1133 - 4/5/2021 1203      4/5/2021             Pulse:  67    SpO2:  96 %        Electronically Signed By: JEVON Aponte CRNA  April 5, 2021  12:03 PM

## 2021-04-05 NOTE — ANESTHESIA POSTPROCEDURE EVALUATION
Patient: Ifrah NORMA Lareyeson    Procedure(s):  Cataract removal with implant.    Diagnosis:Cataract [H26.9]  Diagnosis Additional Information: No value filed.    Anesthesia Type:  MAC    Note:  Disposition: Outpatient   Postop Pain Control: Uneventful            Sign Out: Well controlled pain   PONV: No   Neuro/Psych: Uneventful            Sign Out: Acceptable/Baseline neuro status   Airway/Respiratory: Uneventful            Sign Out: Acceptable/Baseline resp. status   CV/Hemodynamics: Uneventful            Sign Out: Acceptable CV status   Other NRE: NONE   DID A NON-ROUTINE EVENT OCCUR? No         Last vitals:  Vitals:    04/05/21 1036   BP: (!) 143/92   Pulse: 69   Resp: 16   Temp: 36.7  C (98.1  F)   SpO2: 98%       Last vitals prior to Anesthesia Care Transfer:  CRNA VITALS  4/5/2021 1133 - 4/5/2021 1204      4/5/2021             Pulse:  67    SpO2:  96 %          Electronically Signed By: JEVON Aponte CRNA  April 5, 2021  12:04 PM

## 2021-04-07 ENCOUNTER — VIRTUAL VISIT (OUTPATIENT)
Dept: PULMONOLOGY | Facility: CLINIC | Age: 71
End: 2021-04-07
Attending: INTERNAL MEDICINE
Payer: MEDICARE

## 2021-04-07 ENCOUNTER — PRE VISIT (OUTPATIENT)
Dept: PULMONOLOGY | Facility: CLINIC | Age: 71
End: 2021-04-07

## 2021-04-07 DIAGNOSIS — J98.59 MEDIASTINAL MASS: Primary | ICD-10-CM

## 2021-04-07 PROCEDURE — 99204 OFFICE O/P NEW MOD 45 MIN: CPT | Mod: 95 | Performed by: INTERNAL MEDICINE

## 2021-04-07 PROCEDURE — 999N001193 HC VIDEO/TELEPHONE VISIT; NO CHARGE

## 2021-04-07 RX ORDER — CLINDAMYCIN HCL 150 MG
150 CAPSULE ORAL 4 TIMES DAILY
Status: ON HOLD | COMMUNITY
End: 2021-05-20

## 2021-04-07 NOTE — LETTER
"4/7/2021       RE: Ifrah Huitron  [address] [place] [address]     Dear Colleague,    Thank you for referring your patient, Ifrah Huitron, to the [facility] at [facility]. Please see a copy of my visit note below.    Ifrah is a 70 year old who is being evaluated via a billable video visit.      How would you like to obtain your AVS? MyChart  If the video visit is dropped, the invitation should be resent by: Text to cell phone: [phone]  Will anyone else be joining your video visit? No       I have reviewed and updated the patient's allergies and medication list.    Concerns: none  Refills: none     Vitals - Patient Reported  Weight (Patient Reported): 73.9 kg (163 lb)  Height (Patient Reported): 167.6 cm (5' 6\")  BMI (Based on Pt Reported Ht/Wt): 26.31  Pain Score: No Pain (0)    Izabella Trujillo CMA      Video-Visit Details    Type of service:  Video Visit    Video Time:2:23 PM    Originating Location (pt. Location): Home    Distant Location (provider location):  [facility]     Platform used for Video Visit: [facility]  Interventional Pulmonary Clinic Virtual Visit Note    April 7, 2021    Chief complaint:  Ifrah Huitron is a 70 year old male seen for   Chief Complaint   Patient presents with     Video Visit     possible thymic cancer       Reason for clinic visit / Chief complaint:   Abnormal CT scan    Assessment and Plan:  3 lesions in the lung on recent CT scan; 1 in retrosternal area around internal mammary artery with irregular borders and necrotic center likely based on CT images.  This lesion is located in superior anterior mediastinum.  There is another lesion likely a lymph node in the left hilar area which is not reachable bronchoscopically and the third lesion in the pleural surface which is more of a thickening and pleural however pleural seeding cannot be excluded especially " considering the fact that he has a small left-sided pleural effusion.  There is another lesion below the diaphragm on the left side close to the spleen could be related to pulmonary lesions.  Altogether, likely a metastatic disease.  We discussed possible etiologies of these lesions and further diagnostic approaches mostly been biopsy with CT guidance or ultrasound guidance.  His case will be discussed in our multidisciplinary meeting this Friday and we will call him back.  He is in agreement with the plan and going through any biopsy that would be needed.  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  It was prompted his providers to get CT imaging studies finding above findings.    History of Present Illness:  70 years old gentleman with no known pulmonary problems, non-smoker no exposure history in the past recently experienced progressive (initially) chest pain as described above leading to work-up with CT scan (abdomen and chest) revealing above findings and referred to our clinic for further evaluation.  He has also shortness of breath most the last year and he has no more shortness of breath on daily routine activities.  He is a lifetime non-smoker.       Allergies   Allergen Reactions     Penicillins Other (See Comments) and Hives     swelling, hives          Past Medical History:   Diagnosis Date     Arthritis      Thyroid disease     removed pituitary gland        Past Surgical History:   Procedure Laterality Date     COLONOSCOPY N/A 12/17/2020    Procedure: COLONOSCOPY;  Surgeon: Ken Camacho MD;  Location: [facility]     ENT SURGERY       HERNIA REPAIR       PHACOEMULSIFICATION WITH STANDARD INTRAOCULAR LENS IMPLANT Right 3/10/2021    Procedure:  Cataract removal with implant.;  Surgeon: Jamir Mac MD;  Location: [facility]     PHACOEMULSIFICATION WITH STANDARD INTRAOCULAR LENS IMPLANT Left 4/5/2021    Procedure: Cataract removal with implant.;  Surgeon: Jamir Mac MD;  Location: [facility]        Social History     Socioeconomic History     Marital status:      Spouse name: Not on file     Number of children: Not on file     Years of education: Not on file     Highest education level: Not on file   Occupational History     Not on file   Social Needs     Financial resource strain: Not on file     Food insecurity     Worry: Not on file     Inability: Not on file     Transportation needs     Medical: Not on file     Non-medical: Not on file   Tobacco Use     Smoking status: Never Smoker     Smokeless tobacco: Never Used   Substance and Sexual Activity     Alcohol use: Yes     Comment: rare     Drug use: Never     Sexual activity: Not on file   Lifestyle     Physical activity     Days per week: Not on file     Minutes per session: Not on file     Stress: Not on file   Relationships     Social connections     Talks on phone: Not on file     Gets together: Not on file     Attends [employer] service: Not on file     Active member of club or organization: Not on file     Attends meetings of clubs or organizations: Not on file     Relationship status: Not on file     Intimate partner violence     Fear of current or ex partner: Not on file     Emotionally abused: Not on file     Physically abused: Not on file     Forced sexual activity: Not on file   Other Topics Concern     Parent/sibling w/ CABG, MI or angioplasty before 65F 55M? Not Asked   Social History Narrative     Not on file        Family History   Problem Relation Age of Onset     Lupus Mother      ALS Father      Rheumatoid Arthritis Sister         Immunization History   Administered Date(s) Administered     COVID-19,PF,Moderna 03/18/2021     TD (ADULT, 7+) 11/30/2006       Current  Outpatient Medications   Medication Sig     cholecalciferol (VITAMIN D-1000 MAX ST) 1000 units TABS Take 1,000 Units by mouth daily      hydrocortisone (CORTEF) 10 MG tablet Take 15 mg in the morning and 10 mg in the afternoon     ketorolac (ACULAR) 0.5 % ophthalmic solution Place 1 drop into affected eye three times daily. Start 2 Days Prior to Surgery     levothyroxine (SYNTHROID/LEVOTHROID) 75 MCG tablet Take 75 mcg by mouth every morning      minocycline (MINOCIN/DYNACIN) 50 MG capsule Take 50 mg by mouth daily      omeprazole (PRILOSEC) 20 MG DR capsule Take 20 mg by mouth     prednisoLONE acetate (PRED FORTE) 1 % ophthalmic suspension Place 1 drop into affected eye three times daily. Start After the surgery.     clindamycin (CLEOCIN) 150 MG capsule Take 150 mg by mouth 4 times daily For 10 days     fluocinonide (LIDEX) 0.05 % external solution Apply topically 2 times daily as needed      triamcinolone (KENALOG) 0.1 % external cream Apply topically 2 times daily      No current facility-administered medications for this visit.         Review of Systems:  I have done 10 points of review systems and all negative except for those mentioned in HPI    Physical examination  Constitutional: Oriented, not in distress  Head and neck: normal posture and movements  Respiratory: Normal tidal breathing, no shortness of breath, no audible wheezing or stridor over the phone or video visit  Neurological: Alert, orientedx3, no motor deficits  Psychiatric:  Mood and affect are appropriate with insight into his/her medical condition    Data:  Lab Results   Component Value Date    WBC 9.1 03/29/2021     Lab Results   Component Value Date    RBC 5.26 03/29/2021     Lab Results   Component Value Date    HGB 15.0 03/29/2021     Lab Results   Component Value Date    HCT 47.1 03/29/2021     Lab Results   Component Value Date    MCV 90 03/29/2021     Lab Results   Component Value Date    MCH 28.5 03/29/2021     Lab Results   Component  Value Date    MCHC 31.8 03/29/2021     Lab Results   Component Value Date    RDW 13.2 03/29/2021     Lab Results   Component Value Date     03/29/2021       Lab Results   Component Value Date     03/29/2021      Lab Results   Component Value Date    POTASSIUM 3.4 03/29/2021     Lab Results   Component Value Date    CHLORIDE 104 03/29/2021     Lab Results   Component Value Date    COTY 9.4 03/29/2021     Lab Results   Component Value Date    CO2 29 03/29/2021     Lab Results   Component Value Date    BUN 14 03/29/2021     Lab Results   Component Value Date    CR 0.83 03/29/2021     Lab Results   Component Value Date     03/29/2021         SUKHWINDER Hope MD

## 2021-04-07 NOTE — TELEPHONE ENCOUNTER
Lung Nodule Conference      Patient Name: Ifrah Huitron    Reason for conference discussion (brief overview): He is 70 years old gentleman with recent chest pain in his left shoulder and left retrosternal area.  CT chest and abdomen revealed retrosternal masslike lesion, left hilar lymph node, pleural-based nodular density in a masslike lesion close to his spleen below the left hemidiaphragm concerning for metastatic disease.  He has no known pulmonary problems, non-smoker or known cancer in the past.    Specific Question:  Can IR biopsy one of the lesions with ultrasound or CT guidance?    Pertinent Histology:  None    Referring Physician: SUKHWINDER Hope MD    The patient's case was presented at the multidisciplinary conference for the above noted reason.  There was a consensus recommendation for the following actions:     IR can do a CT guided biopsy of the parasplenic lesion.    Case Lead:  {JEVON De León, CNS    Interventional Radiology Staff Present: Bay Triplett PA-C and Dr. Aubrey Ewing will place an IR referral and call patient to let him know of this recommendation.

## 2021-04-07 NOTE — PROGRESS NOTES
"Ifrah is a 70 year old who is being evaluated via a billable video visit.      How would you like to obtain your AVS? MyChart  If the video visit is dropped, the invitation should be resent by: Text to cell phone: [phone]  Will anyone else be joining your video visit? No       I have reviewed and updated the patient's allergies and medication list.    Concerns: none  Refills: none     Vitals - Patient Reported  Weight (Patient Reported): 73.9 kg (163 lb)  Height (Patient Reported): 167.6 cm (5' 6\")  BMI (Based on Pt Reported Ht/Wt): 26.31  Pain Score: No Pain (0)    Izabella Trujillo CMA      Video-Visit Details    Type of service:  Video Visit    Video Time:2:23 PM    Originating Location (pt. Location): Home    Distant Location (provider location):  [facility]     Platform used for Video Visit: [facility]  Interventional Pulmonary Clinic Virtual Visit Note    April 7, 2021    Chief complaint:  Ifrah Huitron is a 70 year old male seen for   Chief Complaint   Patient presents with     Video Visit     possible thymic cancer       Reason for clinic visit / Chief complaint:   Abnormal CT scan    Assessment and Plan:  3 lesions in the lung on recent CT scan; 1 in retrosternal area around internal mammary artery with irregular borders and necrotic center likely based on CT images.  This lesion is located in superior anterior mediastinum.  There is another lesion likely a lymph node in the left hilar area which is not reachable bronchoscopically and the third lesion in the pleural surface which is more of a thickening and pleural however pleural seeding cannot be excluded especially considering the fact that he has a small left-sided pleural effusion.  There is another lesion below the diaphragm on the left side close to the spleen could be related to pulmonary lesions.  Altogether, likely a metastatic disease.  We discussed possible etiologies of these lesions and further diagnostic " approaches mostly been biopsy with CT guidance or ultrasound guidance.  His case will be discussed in our multidisciplinary meeting this Friday and we will call him back.  He is in agreement with the plan and going through any biopsy that would be needed.  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  It was prompted his providers to get CT imaging studies finding above findings.    History of Present Illness:  70 years old gentleman with no known pulmonary problems, non-smoker no exposure history in the past recently experienced progressive (initially) chest pain as described above leading to work-up with CT scan (abdomen and chest) revealing above findings and referred to our clinic for further evaluation.  He has also shortness of breath most the last year and he has no more shortness of breath on daily routine activities.  He is a lifetime non-smoker.       Allergies   Allergen Reactions     Penicillins Other (See Comments) and Hives     swelling, hives          Past Medical History:   Diagnosis Date     Arthritis      Thyroid disease     removed pituitary gland        Past Surgical History:   Procedure Laterality Date     COLONOSCOPY N/A 12/17/2020    Procedure: COLONOSCOPY;  Surgeon: Kne Camacho MD;  Location: [facility]     ENT SURGERY       HERNIA REPAIR       PHACOEMULSIFICATION WITH STANDARD INTRAOCULAR LENS IMPLANT Right 3/10/2021    Procedure: Cataract removal with implant.;  Surgeon: Jamir Mac MD;  Location: [facility]     PHACOEMULSIFICATION WITH STANDARD INTRAOCULAR LENS IMPLANT Left 4/5/2021    Procedure: Cataract removal with implant.;  Surgeon: Jamir Mac MD;  Location: [facility]        Social History     Socioeconomic  History     Marital status:      Spouse name: Not on file     Number of children: Not on file     Years of education: Not on file     Highest education level: Not on file   Occupational History     Not on file   Social Needs     Financial resource strain: Not on file     Food insecurity     Worry: Not on file     Inability: Not on file     Transportation needs     Medical: Not on file     Non-medical: Not on file   Tobacco Use     Smoking status: Never Smoker     Smokeless tobacco: Never Used   Substance and Sexual Activity     Alcohol use: Yes     Comment: rare     Drug use: Never     Sexual activity: Not on file   Lifestyle     Physical activity     Days per week: Not on file     Minutes per session: Not on file     Stress: Not on file   Relationships     Social connections     Talks on phone: Not on file     Gets together: Not on file     Attends [employer] service: Not on file     Active member of club or organization: Not on file     Attends meetings of clubs or organizations: Not on file     Relationship status: Not on file     Intimate partner violence     Fear of current or ex partner: Not on file     Emotionally abused: Not on file     Physically abused: Not on file     Forced sexual activity: Not on file   Other Topics Concern     Parent/sibling w/ CABG, MI or angioplasty before 65F 55M? Not Asked   Social History Narrative     Not on file        Family History   Problem Relation Age of Onset     Lupus Mother      ALS Father      Rheumatoid Arthritis Sister         Immunization History   Administered Date(s) Administered     COVID-19,PF,Moderna 03/18/2021     TD (ADULT, 7+) 11/30/2006       Current Outpatient Medications   Medication Sig     cholecalciferol (VITAMIN D-1000 MAX ST) 1000 units TABS Take 1,000 Units by mouth daily      hydrocortisone (CORTEF) 10 MG tablet Take 15 mg in the morning and 10 mg in the afternoon     ketorolac (ACULAR) 0.5 % ophthalmic solution Place 1 drop into affected eye  three times daily. Start 2 Days Prior to Surgery     levothyroxine (SYNTHROID/LEVOTHROID) 75 MCG tablet Take 75 mcg by mouth every morning      minocycline (MINOCIN/DYNACIN) 50 MG capsule Take 50 mg by mouth daily      omeprazole (PRILOSEC) 20 MG DR capsule Take 20 mg by mouth     prednisoLONE acetate (PRED FORTE) 1 % ophthalmic suspension Place 1 drop into affected eye three times daily. Start After the surgery.     clindamycin (CLEOCIN) 150 MG capsule Take 150 mg by mouth 4 times daily For 10 days     fluocinonide (LIDEX) 0.05 % external solution Apply topically 2 times daily as needed      triamcinolone (KENALOG) 0.1 % external cream Apply topically 2 times daily      No current facility-administered medications for this visit.         Review of Systems:  I have done 10 points of review systems and all negative except for those mentioned in HPI    Physical examination  Constitutional: Oriented, not in distress  Head and neck: normal posture and movements  Respiratory: Normal tidal breathing, no shortness of breath, no audible wheezing or stridor over the phone or video visit  Neurological: Alert, orientedx3, no motor deficits  Psychiatric:  Mood and affect are appropriate with insight into his/her medical condition    Data:  Lab Results   Component Value Date    WBC 9.1 03/29/2021     Lab Results   Component Value Date    RBC 5.26 03/29/2021     Lab Results   Component Value Date    HGB 15.0 03/29/2021     Lab Results   Component Value Date    HCT 47.1 03/29/2021     Lab Results   Component Value Date    MCV 90 03/29/2021     Lab Results   Component Value Date    MCH 28.5 03/29/2021     Lab Results   Component Value Date    MCHC 31.8 03/29/2021     Lab Results   Component Value Date    RDW 13.2 03/29/2021     Lab Results   Component Value Date     03/29/2021       Lab Results   Component Value Date     03/29/2021      Lab Results   Component Value Date    POTASSIUM 3.4 03/29/2021     Lab Results    Component Value Date    CHLORIDE 104 03/29/2021     Lab Results   Component Value Date    COTY 9.4 03/29/2021     Lab Results   Component Value Date    CO2 29 03/29/2021     Lab Results   Component Value Date    BUN 14 03/29/2021     Lab Results   Component Value Date    CR 0.83 03/29/2021     Lab Results   Component Value Date     03/29/2021         SUKHWINDER Hope MD

## 2021-04-09 ENCOUNTER — TEAM CONFERENCE (OUTPATIENT)
Dept: SURGERY | Facility: CLINIC | Age: 71
End: 2021-04-09

## 2021-04-09 ENCOUNTER — TELEPHONE (OUTPATIENT)
Dept: SURGERY | Facility: CLINIC | Age: 71
End: 2021-04-09

## 2021-04-09 DIAGNOSIS — R91.8 MULTIPLE LUNG NODULES ON CT: Primary | ICD-10-CM

## 2021-04-09 DIAGNOSIS — R07.2 SUBSTERNAL CHEST PAIN: Primary | ICD-10-CM

## 2021-04-09 NOTE — PROGRESS NOTES
"    Outpatient IR Biopsy Referral    Patient is a 70 year old male with history of recent chest pain in his left shoulder and left retrosternal area.  CT chest and abdomen revealed retrosternal masslike lesion, left hilar lymph node, pleural-based nodular density in a masslike lesion close to his spleen below the left hemidiaphragm concerning for metastatic disease.  He has no known pulmonary problems, non-smoker or known cancer in the past. . IR has been asked to biopsy one of the lesions. Dr. Hope requesting.    Case and imaging was reviewed at pulmonary nodule conference on 4/9/21 with Dr. Norton present. Biopsy of the left subdiaphragmatic mass is recommended under CT guidance. Series 2 Image 47        Procedure order and surgical pathology order placed. Team requesting flow cytometry as well.    If requesting team would like sample sent for anything else please use the IR order set \"IR RAD Biopsy or Fluid Aspirate Specimens\" to select your necessary diagnostic labs and pend for admission. If there are labs you desire that are not found in this order set or you have questions regarding specific diagnostic labs please call the associated lab personnel. IR will not enter diagnostic labs on the day of the procedure.     Kia Gutierres PA-C  Interventional Radiology   IR on-call pager: [phone]    "

## 2021-04-09 NOTE — TELEPHONE ENCOUNTER
I called Mr Huitron to let him know this has been scheduled (see below).   He regretfully needs to reschedule, said he has a long-planned family vacation and will be back Monday 4/26.    He is anxious about finding out what this is but wants to keep these plans.  I will notify IR and ask to have this biopsy rescheduled to 4/27, if possible, and let patient know.       ----- Message from Karime Egan sent at 4/9/2021  9:22 AM CDT -----  Regarding: Attn: Dr. Hope's   Stan damon,    Patient procedure has been scheduled 4/23 11am arrival time at [facility] Waiting Room.     Please call your patient.    Thanks,    Karime YANES  IR   [phone]

## 2021-04-09 NOTE — TELEPHONE ENCOUNTER
I called Mr. Huitron to let him know of the recommendations for an IR CT-guided biopsy of a parasplenic lesion seen on a recent CT scan.   I confirmed he is not on blood thinners.    He did just start an antibiotic for 10 days, said he had a tooth extraction and an infection went into his sinuses.     I told him the IR team would be in touch with him to get this scheduled.   He appreciated my call.

## 2021-04-15 ENCOUNTER — IMMUNIZATION (OUTPATIENT)
Dept: FAMILY MEDICINE | Facility: CLINIC | Age: 71
End: 2021-04-15
Attending: FAMILY MEDICINE
Payer: MEDICARE

## 2021-04-15 PROCEDURE — 0012A PR COVID VAC MODERNA 100 MCG/0.5 ML IM: CPT

## 2021-04-15 PROCEDURE — 91301 PR COVID VAC MODERNA 100 MCG/0.5 ML IM: CPT

## 2021-04-17 DIAGNOSIS — Z11.59 ENCOUNTER FOR SCREENING FOR OTHER VIRAL DISEASES: ICD-10-CM

## 2021-04-23 ENCOUNTER — TELEPHONE (OUTPATIENT)
Dept: INTERVENTIONAL RADIOLOGY/VASCULAR | Facility: CLINIC | Age: 71
End: 2021-04-23

## 2021-04-26 DIAGNOSIS — Z11.59 ENCOUNTER FOR SCREENING FOR OTHER VIRAL DISEASES: ICD-10-CM

## 2021-04-26 LAB
SARS-COV-2 RNA RESP QL NAA+PROBE: NORMAL
SPECIMEN SOURCE: NORMAL

## 2021-04-26 PROCEDURE — U0003 INFECTIOUS AGENT DETECTION BY NUCLEIC ACID (DNA OR RNA); SEVERE ACUTE RESPIRATORY SYNDROME CORONAVIRUS 2 (SARS-COV-2) (CORONAVIRUS DISEASE [COVID-19]), AMPLIFIED PROBE TECHNIQUE, MAKING USE OF HIGH THROUGHPUT TECHNOLOGIES AS DESCRIBED BY CMS-2020-01-R: HCPCS | Performed by: PHYSICIAN ASSISTANT

## 2021-04-26 PROCEDURE — U0005 INFEC AGEN DETEC AMPLI PROBE: HCPCS | Performed by: PHYSICIAN ASSISTANT

## 2021-04-29 ENCOUNTER — APPOINTMENT (OUTPATIENT)
Dept: INTERVENTIONAL RADIOLOGY/VASCULAR | Facility: CLINIC | Age: 71
End: 2021-04-29
Attending: PHYSICIAN ASSISTANT
Payer: MEDICARE

## 2021-04-29 ENCOUNTER — HOSPITAL ENCOUNTER (OUTPATIENT)
Facility: CLINIC | Age: 71
Discharge: HOME | End: 2021-04-29
Attending: INTERNAL MEDICINE | Admitting: RADIOLOGY
Payer: MEDICARE

## 2021-04-29 ENCOUNTER — APPOINTMENT (OUTPATIENT)
Dept: MEDSURG UNIT | Facility: CLINIC | Age: 71
End: 2021-04-29
Attending: INTERNAL MEDICINE
Payer: MEDICARE

## 2021-04-29 VITALS
WEIGHT: 163 LBS | TEMPERATURE: 98.4 F | DIASTOLIC BLOOD PRESSURE: 87 MMHG | HEART RATE: 76 BPM | HEIGHT: 66 IN | OXYGEN SATURATION: 97 % | BODY MASS INDEX: 26.2 KG/M2 | SYSTOLIC BLOOD PRESSURE: 145 MMHG | RESPIRATION RATE: 18 BRPM

## 2021-04-29 DIAGNOSIS — R07.2 SUBSTERNAL CHEST PAIN: ICD-10-CM

## 2021-04-29 LAB
ERYTHROCYTE [DISTWIDTH] IN BLOOD BY AUTOMATED COUNT: 13.5 % (ref 10–15)
GLUCOSE SERPL-MCNC: 112 MG/DL (ref 70–99)
HCT VFR BLD AUTO: 46.3 % (ref 40–53)
HGB BLD-MCNC: 15.1 G/DL (ref 13.3–17.7)
INR PPP: 1.13 (ref 0.86–1.14)
MCH RBC QN AUTO: 28.9 PG (ref 26.5–33)
MCHC RBC AUTO-ENTMCNC: 32.6 G/DL (ref 31.5–36.5)
MCV RBC AUTO: 89 FL (ref 78–100)
PLATELET # BLD AUTO: 260 10E9/L (ref 150–450)
RBC # BLD AUTO: 5.22 10E12/L (ref 4.4–5.9)
WBC # BLD AUTO: 10.5 10E9/L (ref 4–11)

## 2021-04-29 PROCEDURE — 49180 BIOPSY ABDOMINAL MASS: CPT | Mod: GC | Performed by: RADIOLOGY

## 2021-04-29 PROCEDURE — 250N000011 HC RX IP 250 OP 636: Performed by: STUDENT IN AN ORGANIZED HEALTH CARE EDUCATION/TRAINING PROGRAM

## 2021-04-29 PROCEDURE — 88342 IMHCHEM/IMCYTCHM 1ST ANTB: CPT | Mod: TC | Performed by: INTERNAL MEDICINE

## 2021-04-29 PROCEDURE — 88307 TISSUE EXAM BY PATHOLOGIST: CPT | Mod: 26 | Performed by: PATHOLOGY

## 2021-04-29 PROCEDURE — 88341 IMHCHEM/IMCYTCHM EA ADD ANTB: CPT | Mod: 26 | Performed by: PATHOLOGY

## 2021-04-29 PROCEDURE — 88342 IMHCHEM/IMCYTCHM 1ST ANTB: CPT | Mod: 26 | Performed by: PATHOLOGY

## 2021-04-29 PROCEDURE — 88341 IMHCHEM/IMCYTCHM EA ADD ANTB: CPT | Mod: TC | Performed by: INTERNAL MEDICINE

## 2021-04-29 PROCEDURE — 999N000133 HC STATISTIC PP CARE STAGE 2

## 2021-04-29 PROCEDURE — 258N000003 HC RX IP 258 OP 636: Performed by: NURSE PRACTITIONER

## 2021-04-29 PROCEDURE — 88185 FLOWCYTOMETRY/TC ADD-ON: CPT | Performed by: INTERNAL MEDICINE

## 2021-04-29 PROCEDURE — 99152 MOD SED SAME PHYS/QHP 5/>YRS: CPT | Mod: GC | Performed by: RADIOLOGY

## 2021-04-29 PROCEDURE — 88184 FLOWCYTOMETRY/ TC 1 MARKER: CPT | Performed by: INTERNAL MEDICINE

## 2021-04-29 PROCEDURE — 82947 ASSAY GLUCOSE BLOOD QUANT: CPT | Mod: GZ | Performed by: NURSE PRACTITIONER

## 2021-04-29 PROCEDURE — 85610 PROTHROMBIN TIME: CPT | Performed by: NURSE PRACTITIONER

## 2021-04-29 PROCEDURE — 88189 FLOWCYTOMETRY/READ 16 & >: CPT | Performed by: PATHOLOGY

## 2021-04-29 PROCEDURE — 77012 CT SCAN FOR NEEDLE BIOPSY: CPT | Mod: 26 | Performed by: RADIOLOGY

## 2021-04-29 PROCEDURE — 88307 TISSUE EXAM BY PATHOLOGIST: CPT | Mod: TC | Performed by: INTERNAL MEDICINE

## 2021-04-29 PROCEDURE — 272N000505 HC NEEDLE CR5

## 2021-04-29 PROCEDURE — 88312 SPECIAL STAINS GROUP 1: CPT | Mod: TC,59 | Performed by: INTERNAL MEDICINE

## 2021-04-29 PROCEDURE — 999N001137 HC STATISTIC FLOW >15 ABY TC 88189: Performed by: INTERNAL MEDICINE

## 2021-04-29 PROCEDURE — 99152 MOD SED SAME PHYS/QHP 5/>YRS: CPT

## 2021-04-29 PROCEDURE — 85027 COMPLETE CBC AUTOMATED: CPT | Performed by: NURSE PRACTITIONER

## 2021-04-29 PROCEDURE — 77012 CT SCAN FOR NEEDLE BIOPSY: CPT | Mod: MG

## 2021-04-29 PROCEDURE — 88312 SPECIAL STAINS GROUP 1: CPT | Mod: 26 | Performed by: PATHOLOGY

## 2021-04-29 PROCEDURE — 250N000009 HC RX 250: Performed by: STUDENT IN AN ORGANIZED HEALTH CARE EDUCATION/TRAINING PROGRAM

## 2021-04-29 RX ORDER — NALOXONE HYDROCHLORIDE 0.4 MG/ML
0.2 INJECTION, SOLUTION INTRAMUSCULAR; INTRAVENOUS; SUBCUTANEOUS
Status: DISCONTINUED | OUTPATIENT
Start: 2021-04-29 | End: 2021-04-29 | Stop reason: HOSPADM

## 2021-04-29 RX ORDER — LIDOCAINE 40 MG/G
CREAM TOPICAL
Status: DISCONTINUED | OUTPATIENT
Start: 2021-04-29 | End: 2021-04-29 | Stop reason: HOSPADM

## 2021-04-29 RX ORDER — ONDANSETRON 2 MG/ML
4 INJECTION INTRAMUSCULAR; INTRAVENOUS
Status: COMPLETED | OUTPATIENT
Start: 2021-04-29 | End: 2021-04-29

## 2021-04-29 RX ORDER — NALOXONE HYDROCHLORIDE 0.4 MG/ML
0.4 INJECTION, SOLUTION INTRAMUSCULAR; INTRAVENOUS; SUBCUTANEOUS
Status: DISCONTINUED | OUTPATIENT
Start: 2021-04-29 | End: 2021-04-29 | Stop reason: HOSPADM

## 2021-04-29 RX ORDER — FENTANYL CITRATE 50 UG/ML
25-50 INJECTION, SOLUTION INTRAMUSCULAR; INTRAVENOUS EVERY 5 MIN PRN
Status: DISCONTINUED | OUTPATIENT
Start: 2021-04-29 | End: 2021-04-29 | Stop reason: HOSPADM

## 2021-04-29 RX ORDER — SODIUM CHLORIDE 9 MG/ML
INJECTION, SOLUTION INTRAVENOUS CONTINUOUS
Status: DISCONTINUED | OUTPATIENT
Start: 2021-04-29 | End: 2021-04-29 | Stop reason: HOSPADM

## 2021-04-29 RX ORDER — FLUMAZENIL 0.1 MG/ML
0.2 INJECTION, SOLUTION INTRAVENOUS
Status: DISCONTINUED | OUTPATIENT
Start: 2021-04-29 | End: 2021-04-29 | Stop reason: HOSPADM

## 2021-04-29 RX ADMIN — LIDOCAINE HYDROCHLORIDE 6 ML: 10 INJECTION, SOLUTION EPIDURAL; INFILTRATION; INTRACAUDAL; PERINEURAL at 14:58

## 2021-04-29 RX ADMIN — FENTANYL CITRATE 50 MCG: 50 INJECTION, SOLUTION INTRAMUSCULAR; INTRAVENOUS at 15:02

## 2021-04-29 RX ADMIN — FENTANYL CITRATE 50 MCG: 50 INJECTION, SOLUTION INTRAMUSCULAR; INTRAVENOUS at 14:56

## 2021-04-29 RX ADMIN — MIDAZOLAM 1 MG: 1 INJECTION INTRAMUSCULAR; INTRAVENOUS at 14:56

## 2021-04-29 RX ADMIN — ONDANSETRON 4 MG: 2 INJECTION INTRAMUSCULAR; INTRAVENOUS at 14:56

## 2021-04-29 RX ADMIN — SODIUM CHLORIDE: 9 INJECTION, SOLUTION INTRAVENOUS at 12:29

## 2021-04-29 RX ADMIN — MIDAZOLAM 1 MG: 1 INJECTION INTRAMUSCULAR; INTRAVENOUS at 15:03

## 2021-04-29 ASSESSMENT — MIFFLIN-ST. JEOR: SCORE: 1431.86

## 2021-04-29 NOTE — PROCEDURES
[facility]    Procedure: Abdominal mass biopsy    Date/Time: 4/29/2021 3:13 PM  Performed by: Ralph Diaz MD  Authorized by: Ralph Diaz MD   IR Fellow Physician: Ralph Diaz  Radiology Resident Physician: Stefano Meek      UNIVERSAL PROTOCOL   Site Marked: NA  Prior Images Obtained and Reviewed:  Yes  Required items: Required blood products, implants, devices and special equipment available    Patient identity confirmed:  Verbally with patient, arm band, provided demographic data and hospital-assigned identification number  Patient was reevaluated immediately before administering moderate or deep sedation or anesthesia  Confirmation Checklist:  Patient's identity using two indicators, relevant allergies, procedure was appropriate and matched the consent or emergent situation and correct equipment/implants were available  Time out: Immediately prior to the procedure a time out was called    Universal Protocol: the Joint Commission Universal Protocol was followed    Preparation: Patient was prepped and draped in usual sterile fashion    ESBL (mL):  1         ANESTHESIA    Anesthesia: Local infiltration  Local Anesthetic:  Lidocaine 1% without epinephrine  Anesthetic Total (mL):  8      SEDATION    Patient Sedated: Yes    Sedation Type:  Moderate (conscious) sedation  Vital signs: Vital signs monitored during sedation    See dictated procedure note for full details.  Findings: 6 core biopsy specimens of abdominal mass obtained and sent in RPMI and formalin    Specimens: none    Complications: None    Condition: Stable    Plan: Bed rest for 1 hr    PROCEDURE   Patient Tolerance:  Patient tolerated the procedure well with no immediate complications    Length of time physician/provider present for 1:1 monitoring during sedation: 20

## 2021-04-29 NOTE — PRE-PROCEDURE
GENERAL PRE-PROCEDURE:   Procedure:  CT-Guided Left Subdiaphragmatic Mass Biopsy  Date/Time:  4/29/2021 2:08 PM    Verbal consent obtained?: Yes    Written consent obtained?: Yes    Risks and benefits: Risks, benefits and alternatives were discussed    Consent given by:  Patient  Patient states understanding of procedure being performed: Yes    Patient's understanding of procedure matches consent: Yes    Procedure consent matches procedure scheduled: Yes    Expected level of sedation:  Moderate  Appropriately NPO:  Yes  ASA Class:  Class 2- mild systemic disease, no acute problems, no functional limitations  Mallampati  :  Grade 2- soft palate, base of uvula, tonsillar pillars, and portion of posterior pharyngeal wall visible  Lungs:  Lungs clear with good breath sounds bilaterally  Heart:  Normal heart sounds and rate  History & Physical reviewed:  History and physical reviewed and no updates needed  Statement of review:  I have reviewed the lab findings, diagnostic data, medications, and the plan for sedation

## 2021-04-29 NOTE — DISCHARGE INSTRUCTIONS
[facility]    Interventional Radiology  Patient Instructions Following Biopsy    AFTER YOU GO HOME  ? If you were given sedation DO NOT drive or operate machinery at home or at work for at least 24 hours  ? DO relax and take it easy for 48 hours, no strenuous activity for 24 hours  ? DO drink plenty of fluids  ? DO resume your regular diet, unless otherwise instructed by your Primary Physician  ? Keep the dressing dry and in place for 24 hours.  ? DO NOT SMOKE FOR AT LEAST 24 HOURS, if you have been given any medications that were to help you relax or sedate you during your procedure  ? DO NOT drink alcoholic beverages the day of your procedure  ? DO NOT do any strenuous exercise or lifting (> 10 lbs) for at least 7 days following your procedure  ? DO NOT take a bath or shower for at least 12 hours following your procedure  ? Remove dressing after shower the next day. Replace with Band aid for 2 days.  Never leave a wet dressing in place.  ? DO NOT make any important or legal decisions for 24 hours following your procedure  ? There should be minimum drainage from the biopsy site    CALL THE PHYSICIAN IF:  ? You start bleeding from the procedure site.  If you do start to bleed from that site, lie down flat and hold pressure on the site for a minimum of 10 minutes.  Your physician will tell you if you need to return to the hospital  ? You develop nausea or vomiting  ? You have excessive swelling, redness, or tenderness at the site  ? You have drainage that looks like it is infected.  ? You experience severe pain  ? You develop hives or a rash or unexplained itching  ? You develop shortness of breath  ? You develop a temperature of 101 degrees F or greater  ? You develop chest pain or cough up blood, lightheadedness or fainting    [facility] INTERVENTIONAL RADIOLOGY DEPARTMENT  Procedure Physician: Dr Ralph Diaz     Date of procedure: April 29, 2021  Telephone Numbers: [phone] Monday-Friday 8:00  am to 4:30 pm  [phone] After 4:30 pm Monday-Friday, Weekends & Holidays.   Ask for the Interventional Radiologist on call.  Someone is on call 24 hrs/day  [facility] toll free number: [phone] Monday-Friday 8:00 am to 4:30 pm  [facility] Emergency Dept: [phone]

## 2021-04-29 NOTE — IP AVS SNAPSHOT
[facility] [address] [address]                                    After Visit Summary   4/29/2021    Ifrah Huitron    MRN: [id number]           After Visit Summary Signature Page    I have received my discharge instructions, and my questions have been answered. I have discussed any challenges I see with this plan with the nurse or doctor.    ..........................................................................................................................................  Patient/Patient Representative Signature      ..........................................................................................................................................  Patient Representative Print Name and Relationship to Patient    ..................................................               ................................................  Date                                   Time    ..........................................................................................................................................  Reviewed by Signature/Title    ...................................................              ..............................................  Date                                               Time          22EPIC Rev 08/18

## 2021-04-29 NOTE — IR NOTE
Patient Name: Ifrah Huitron  Medical Record Number: [id number]  Today's Date: 4/29/2021    Procedure: image guided left subdiaphragmatic mass biopsy  Proceduralist: Dr GABBY Kinney, Dr KELLI Meek, Dr GABRIELE Diaz    Sedation start time: 1455  Sedation end time: 1509  Sedation medications administered: 2 mg versed, 100 mcg fentanyl  Total sedation time: 14 min  Sedation Notes: none    Procedure start time: 1454  Procedure end time: 1508    Report given to: Tabatha Padilla RN    : None    Other Notes: Pt arrived to IR room CT2 from . Consent reviewed, pt confirmed. Pt denies any questions or concerns regarding procedure. Pt positioned prone and monitored per protocol. Specimens sent as ordered per pathology. Site cleansed and dressed per protocol. Pt tolerated procedure without any noted complications. Pt transferred back to .

## 2021-04-29 NOTE — PROGRESS NOTES
Pt returned from IR at 1520 vial liter accompanied by nurse.His left medial flank biopsy site looks C/D/I and no hematoma and denies pain.Pt tolerating food and fluids.

## 2021-04-29 NOTE — PROGRESS NOTES
Patient tolerated recovery stage well. VSS, left medial flank  site clean/dry/intact, no hematoma, and denies pain. Patient tolerated PO food and fluids. Teaching was done and discharge instructions were given. Patient ambulated, voided, and PIV was removed. Patient discharged from the hospital via wheel chair to home with Gage

## 2021-04-29 NOTE — PROGRESS NOTES
Pt prepped for retroperitoneal biopsy.PIV placed and labs sent and NS infusing.Pt was consented and questions answered with wife Abida at bedside.

## 2021-04-29 NOTE — IP AVS SNAPSHOT
After Visit Summary Template Not Found    This Print Group is only intended to be used in the After Visit Summary and can only be used in a report that uses a released After Visit Summary Template.                       MRN:[id number]                      After Visit Summary   4/29/2021    Ifrah Huitron    MRN: [id number]           Visit Information        Department      4/29/2021 10:51 AM [facility] [address] [place]          Review of your medicines      UNREVIEWED medicines. Ask your doctor about these medicines       Dose / Directions   clindamycin 150 MG capsule  Commonly known as: CLEOCIN      Dose: 150 mg  Take 150 mg by mouth 4 times daily For 10 days  Refills: 0     hydrocortisone 10 MG tablet  Commonly known as: CORTEF      Take 15 mg in the morning and 10 mg in the afternoon  Refills: 0     levothyroxine 75 MCG tablet  Commonly known as: SYNTHROID/LEVOTHROID      Dose: 75 mcg  Take 75 mcg by mouth every morning  Refills: 0     minocycline 50 MG capsule  Commonly known as: MINOCIN      Dose: 50 mg  Take 50 mg by mouth daily  Refills: 0     omeprazole 20 MG DR capsule  Commonly known as: priLOSEC      Dose: 20 mg  Take 20 mg by mouth  Refills: 0     triamcinolone 0.1 % external cream  Commonly known as: KENALOG      Apply topically 2 times daily  Refills: 0     Vitamin D-1000 Max St 25 MCG (1000 UT) Tabs  Generic drug: cholecalciferol      Dose: 1,000 Units  Take 1,000 Units by mouth daily  Refills: 0              Protect others around you: Learn how to safely use, store and throw away your medicines at www.disposemymeds.org.       Follow-ups after your visit       Care Instructions       Further instructions from your care team       [facility]    Interventional Radiology  Patient Instructions Following Biopsy    AFTER YOU GO HOME  ? If you were given sedation DO NOT drive or operate machinery at home or at work for at least 24 hours  ? DO relax and take it easy for 48  hours, no strenuous activity for 24 hours  ? DO drink plenty of fluids  ? DO resume your regular diet, unless otherwise instructed by your Primary Physician  ? Keep the dressing dry and in place for 24 hours.  ? DO NOT SMOKE FOR AT LEAST 24 HOURS, if you have been given any medications that were to help you relax or sedate you during your procedure  ? DO NOT drink alcoholic beverages the day of your procedure  ? DO NOT do any strenuous exercise or lifting (> 10 lbs) for at least 7 days following your procedure  ? DO NOT take a bath or shower for at least 12 hours following your procedure  ? Remove dressing after shower the next day. Replace with Band aid for 2 days.  Never leave a wet dressing in place.  ? DO NOT make any important or legal decisions for 24 hours following your procedure  ? There should be minimum drainage from the biopsy site    CALL THE PHYSICIAN IF:  ? You start bleeding from the procedure site.  If you do start to bleed from that site, lie down flat and hold pressure on the site for a minimum of 10 minutes.  Your physician will tell you if you need to return to the hospital  ? You develop nausea or vomiting  ? You have excessive swelling, redness, or tenderness at the site  ? You have drainage that looks like it is infected.  ? You experience severe pain  ? You develop hives or a rash or unexplained itching  ? You develop shortness of breath  ? You develop a temperature of 101 degrees F or greater  ? You develop chest pain or cough up blood, lightheadedness or fainting    [facility] INTERVENTIONAL RADIOLOGY DEPARTMENT  Procedure Physician: Dr Ralph Diaz     Date of procedure: April 29, 2021  Telephone Numbers: [phone] Monday-Friday 8:00 am to 4:30 pm  [phone] After 4:30 pm Monday-Friday, Weekends & Holidays.   Ask for the Interventional Radiologist on call.  Someone is on call 24 hrs/day  [facility] toll free number: [phone] Monday-Friday 8:00 am to 4:30 pm  [facility] Emergency  "Dept: [phone]          Additional Information About Your Visit       [employer] Information    [employer] gives you secure access to your electronic health record. If you see a primary care provider, you can also send messages to your care team and make appointments. If you have questions, please call your primary care clinic.  If you do not have a primary care provider, please call [phone] and they will assist you.       Care EveryWhere ID    This is your Care EveryWhere ID. This could be used by other organizations to access your [facility] medical records  [id number]       Your Vitals Were  Most recent update: 4/29/2021  4:18 PM    Blood Pressure   135/88 (BP Location: Left arm)          Pulse   77          Temperature   98.4  F (36.9  C) (Oral)          Respirations   18          Height   1.676 m (5' 5.98\")             Weight   73.9 kg (163 lb)    Pulse Oximetry   95%    BMI (Body Mass Index)   26.32 kg/m           Primary Care Provider Office Phone # Fax #    Sukhdev Kohler [phone]      Equal Access to Services    [facility]: Hadii jill wolfe hadasho Soomaali, waaxda luqadaha, qaybta kaalmada adeegyasylvain, sabine velez . So [facility] [phone].    ATENCIÓN: Si habla español, tiene a marie disposición servicios gratuitos de asistencia lingüística. Marvin al [phone].    We comply with applicable federal and state civil rights laws, including the [place] Human Rights Act. We do not discriminate on the basis of race, color, creed, Advent, national origin, marital status, age, disability, sex, sexual orientation, or gender identity.    If you would like an itemization of your charges they will now be available in [employer] 30 days after discharge. To access the itemized statements in [employer] go to billing/billing summary. From there select view account. There will be multiple tabs showing an overview of your account, detail, payments, and communications. " From the communications tab you can see your monthly statements, your itemized statements, and any billing letters generated for your account. If you do not have a [employer] account and need help getting access please contact [employer] support at [phone].  If you would prefer to have your itemized statements mailed please contact our automated itemized bill request line at [phone] option  2.       Thank you!    Thank you for choosing [facility] for your care. Our goal is always to provide you with excellent care. Hearing back from our patients is one way we can continue to improve our services. Please take a few minutes to complete the written survey that you may receive in the mail after you visit with us. Thank you!            Medication List      ASK your doctor about these medications          Morning Afternoon Evening Bedtime As Needed    clindamycin 150 MG capsule  Also known as: CLEOCIN  INSTRUCTIONS: Take 150 mg by mouth 4 times daily For 10 days                     hydrocortisone 10 MG tablet  Also known as: CORTEF  INSTRUCTIONS: Take 15 mg in the morning and 10 mg in the afternoon                     levothyroxine 75 MCG tablet  Also known as: SYNTHROID/LEVOTHROID  INSTRUCTIONS: Take 75 mcg by mouth every morning                     minocycline 50 MG capsule  Also known as: MINOCIN  INSTRUCTIONS: Take 50 mg by mouth daily                     omeprazole 20 MG DR capsule  Also known as: priLOSEC  INSTRUCTIONS: Take 20 mg by mouth                     triamcinolone 0.1 % external cream  Also known as: KENALOG  INSTRUCTIONS: Apply topically 2 times daily                     Vitamin D-1000 Max St 25 MCG (1000 UT) Tabs  INSTRUCTIONS: Take 1,000 Units by mouth daily  Generic drug: cholecalciferol

## 2021-04-30 LAB — COPATH REPORT: NORMAL

## 2021-05-07 ENCOUNTER — DOCUMENTATION ONLY (OUTPATIENT)
Dept: RADIOLOGY | Facility: CLINIC | Age: 71
End: 2021-05-07

## 2021-05-07 ENCOUNTER — TELEPHONE (OUTPATIENT)
Dept: SURGERY | Facility: CLINIC | Age: 71
End: 2021-05-07

## 2021-05-07 ENCOUNTER — TELEPHONE (OUTPATIENT)
Dept: FAMILY MEDICINE | Facility: CLINIC | Age: 71
End: 2021-05-07

## 2021-05-07 DIAGNOSIS — Z11.59 ENCOUNTER FOR SCREENING FOR OTHER VIRAL DISEASES: ICD-10-CM

## 2021-05-07 DIAGNOSIS — J98.59 MEDIASTINAL MASS: Primary | ICD-10-CM

## 2021-05-07 NOTE — PROGRESS NOTES
Patient Ifrah Huitron was referred to interventional radiology for a biopsy of an anterior mediastinal mass.  Patient was reviewed at pulmonary nodule conference and biopsy was recommended.  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  Pulmonary nodule team recommends repeat biopsy of mediastinal mass.  Patient's images reviewed with interventional radiology provider Dr. Bowden and biopsy was approved based on CT chest images from 3/26/2021 series 2 image 22.    Order for CT-guided biopsy has been entered.  Orders for standard pathology, cultures, fungal have been entered.    Patient had a discussion with pulmonology provider about plan for repeat biopsy.  Patient recently had a biopsy and is a clear understanding of the risks and benefits.  Patient's permission was passed along to IR scheduling.      Mauri Rodriguez PA-C on 5/7/2021 at 3:26 PM

## 2021-05-07 NOTE — TELEPHONE ENCOUNTER
"I called Mr. Huitron to discuss recommendation for another IR biopsy of his anterior mediastinal mass.  He just had one done on another mass below the diaphragm revealing necrotizing granulomas.    He agrees to this plan, is aware of procedure and need for Covid testing prior.  He is currently taking an OTC medication that is 50% aspirin and 50% caffeine which helps with his pain \"for the most part\".   I told him this will need to be held x 5 days prior to this biopsy.   I told him we will call him once scheduled.  " 15-May-2017 18:39

## 2021-05-16 DIAGNOSIS — Z11.59 ENCOUNTER FOR SCREENING FOR OTHER VIRAL DISEASES: ICD-10-CM

## 2021-05-16 LAB
SARS-COV-2 RNA RESP QL NAA+PROBE: NORMAL
SPECIMEN SOURCE: NORMAL

## 2021-05-16 PROCEDURE — U0003 INFECTIOUS AGENT DETECTION BY NUCLEIC ACID (DNA OR RNA); SEVERE ACUTE RESPIRATORY SYNDROME CORONAVIRUS 2 (SARS-COV-2) (CORONAVIRUS DISEASE [COVID-19]), AMPLIFIED PROBE TECHNIQUE, MAKING USE OF HIGH THROUGHPUT TECHNOLOGIES AS DESCRIBED BY CMS-2020-01-R: HCPCS | Performed by: PHYSICIAN ASSISTANT

## 2021-05-16 PROCEDURE — U0005 INFEC AGEN DETEC AMPLI PROBE: HCPCS | Performed by: PHYSICIAN ASSISTANT

## 2021-05-17 ENCOUNTER — MYC MEDICAL ADVICE (OUTPATIENT)
Dept: INTERVENTIONAL RADIOLOGY/VASCULAR | Facility: CLINIC | Age: 71
End: 2021-05-17

## 2021-05-20 ENCOUNTER — HOSPITAL ENCOUNTER (OUTPATIENT)
Facility: CLINIC | Age: 71
Discharge: HOME | End: 2021-05-20
Attending: INTERNAL MEDICINE | Admitting: INTERNAL MEDICINE
Payer: MEDICARE

## 2021-05-20 ENCOUNTER — APPOINTMENT (OUTPATIENT)
Dept: INTERVENTIONAL RADIOLOGY/VASCULAR | Facility: CLINIC | Age: 71
End: 2021-05-20
Attending: CLINICAL NURSE SPECIALIST
Payer: MEDICARE

## 2021-05-20 ENCOUNTER — APPOINTMENT (OUTPATIENT)
Dept: MEDSURG UNIT | Facility: CLINIC | Age: 71
End: 2021-05-20
Attending: INTERNAL MEDICINE
Payer: MEDICARE

## 2021-05-20 VITALS
HEART RATE: 68 BPM | DIASTOLIC BLOOD PRESSURE: 85 MMHG | SYSTOLIC BLOOD PRESSURE: 126 MMHG | RESPIRATION RATE: 16 BRPM | TEMPERATURE: 97.5 F | OXYGEN SATURATION: 98 %

## 2021-05-20 DIAGNOSIS — J98.59 MEDIASTINAL MASS: ICD-10-CM

## 2021-05-20 LAB
GRAM STN SPEC: NORMAL
GRAM STN SPEC: NORMAL
SPECIMEN SOURCE: NORMAL

## 2021-05-20 PROCEDURE — 88333 PATH CONSLTJ SURG CYTO XM 1: CPT | Mod: 26 | Performed by: PATHOLOGY

## 2021-05-20 PROCEDURE — 88185 FLOWCYTOMETRY/TC ADD-ON: CPT | Performed by: INTERNAL MEDICINE

## 2021-05-20 PROCEDURE — 81445 SO NEO GSAP 5-50DNA/DNA&RNA: CPT | Performed by: INTERNAL MEDICINE

## 2021-05-20 PROCEDURE — 88341 IMHCHEM/IMCYTCHM EA ADD ANTB: CPT | Mod: TC | Performed by: INTERNAL MEDICINE

## 2021-05-20 PROCEDURE — 250N000009 HC RX 250: Performed by: STUDENT IN AN ORGANIZED HEALTH CARE EDUCATION/TRAINING PROGRAM

## 2021-05-20 PROCEDURE — 87075 CULTR BACTERIA EXCEPT BLOOD: CPT | Performed by: INTERNAL MEDICINE

## 2021-05-20 PROCEDURE — 77012 CT SCAN FOR NEEDLE BIOPSY: CPT | Mod: MG

## 2021-05-20 PROCEDURE — 99153 MOD SED SAME PHYS/QHP EA: CPT

## 2021-05-20 PROCEDURE — 88189 FLOWCYTOMETRY/READ 16 & >: CPT | Performed by: PATHOLOGY

## 2021-05-20 PROCEDURE — 88312 SPECIAL STAINS GROUP 1: CPT | Mod: TC | Performed by: INTERNAL MEDICINE

## 2021-05-20 PROCEDURE — 272N000505 HC NEEDLE CR5

## 2021-05-20 PROCEDURE — 88360 TUMOR IMMUNOHISTOCHEM/MANUAL: CPT | Mod: 26 | Performed by: PATHOLOGY

## 2021-05-20 PROCEDURE — 999N001137 HC STATISTIC FLOW >15 ABY TC 88189: Performed by: INTERNAL MEDICINE

## 2021-05-20 PROCEDURE — 88184 FLOWCYTOMETRY/ TC 1 MARKER: CPT | Performed by: INTERNAL MEDICINE

## 2021-05-20 PROCEDURE — 88333 PATH CONSLTJ SURG CYTO XM 1: CPT | Mod: TC | Performed by: INTERNAL MEDICINE

## 2021-05-20 PROCEDURE — 32408 CORE NDL BX LNG/MED PERQ: CPT | Mod: GC | Performed by: RADIOLOGY

## 2021-05-20 PROCEDURE — 999N001020 HC STATISTIC H-SEND OUTS PREP: Performed by: INTERNAL MEDICINE

## 2021-05-20 PROCEDURE — 250N000011 HC RX IP 250 OP 636: Performed by: STUDENT IN AN ORGANIZED HEALTH CARE EDUCATION/TRAINING PROGRAM

## 2021-05-20 PROCEDURE — 999N000133 HC STATISTIC PP CARE STAGE 2

## 2021-05-20 PROCEDURE — 88312 SPECIAL STAINS GROUP 1: CPT | Mod: 26 | Performed by: PATHOLOGY

## 2021-05-20 PROCEDURE — 99152 MOD SED SAME PHYS/QHP 5/>YRS: CPT

## 2021-05-20 PROCEDURE — 87205 SMEAR GRAM STAIN: CPT | Performed by: INTERNAL MEDICINE

## 2021-05-20 PROCEDURE — 88342 IMHCHEM/IMCYTCHM 1ST ANTB: CPT | Mod: TC | Performed by: INTERNAL MEDICINE

## 2021-05-20 PROCEDURE — 88305 TISSUE EXAM BY PATHOLOGIST: CPT | Mod: 26 | Performed by: PATHOLOGY

## 2021-05-20 PROCEDURE — 88341 IMHCHEM/IMCYTCHM EA ADD ANTB: CPT | Mod: 26 | Performed by: PATHOLOGY

## 2021-05-20 PROCEDURE — 87102 FUNGUS ISOLATION CULTURE: CPT | Performed by: INTERNAL MEDICINE

## 2021-05-20 PROCEDURE — 88271 CYTOGENETICS DNA PROBE: CPT | Mod: 91,GZ | Performed by: PATHOLOGY

## 2021-05-20 PROCEDURE — 258N000003 HC RX IP 258 OP 636: Performed by: NURSE PRACTITIONER

## 2021-05-20 PROCEDURE — 88305 TISSUE EXAM BY PATHOLOGIST: CPT | Mod: TC | Performed by: INTERNAL MEDICINE

## 2021-05-20 PROCEDURE — 87176 TISSUE HOMOGENIZATION CULTR: CPT | Performed by: INTERNAL MEDICINE

## 2021-05-20 PROCEDURE — 88275 CYTOGENETICS 100-300: CPT | Mod: 91,GZ | Performed by: PATHOLOGY

## 2021-05-20 PROCEDURE — 88360 TUMOR IMMUNOHISTOCHEM/MANUAL: CPT | Mod: TC | Performed by: INTERNAL MEDICINE

## 2021-05-20 PROCEDURE — 99152 MOD SED SAME PHYS/QHP 5/>YRS: CPT | Mod: GC | Performed by: RADIOLOGY

## 2021-05-20 PROCEDURE — 87070 CULTURE OTHR SPECIMN AEROBIC: CPT | Performed by: INTERNAL MEDICINE

## 2021-05-20 PROCEDURE — 88342 IMHCHEM/IMCYTCHM 1ST ANTB: CPT | Mod: 26 | Performed by: PATHOLOGY

## 2021-05-20 RX ORDER — FLUMAZENIL 0.1 MG/ML
0.2 INJECTION, SOLUTION INTRAVENOUS
Status: DISCONTINUED | OUTPATIENT
Start: 2021-05-20 | End: 2021-05-20 | Stop reason: HOSPADM

## 2021-05-20 RX ORDER — LIDOCAINE 40 MG/G
CREAM TOPICAL
Status: DISCONTINUED | OUTPATIENT
Start: 2021-05-20 | End: 2021-05-20 | Stop reason: HOSPADM

## 2021-05-20 RX ORDER — NALOXONE HYDROCHLORIDE 0.4 MG/ML
0.2 INJECTION, SOLUTION INTRAMUSCULAR; INTRAVENOUS; SUBCUTANEOUS
Status: DISCONTINUED | OUTPATIENT
Start: 2021-05-20 | End: 2021-05-20 | Stop reason: HOSPADM

## 2021-05-20 RX ORDER — SODIUM CHLORIDE 9 MG/ML
INJECTION, SOLUTION INTRAVENOUS CONTINUOUS
Status: DISCONTINUED | OUTPATIENT
Start: 2021-05-20 | End: 2021-05-20 | Stop reason: HOSPADM

## 2021-05-20 RX ORDER — FENTANYL CITRATE 50 UG/ML
25-50 INJECTION, SOLUTION INTRAMUSCULAR; INTRAVENOUS EVERY 5 MIN PRN
Status: DISCONTINUED | OUTPATIENT
Start: 2021-05-20 | End: 2021-05-20 | Stop reason: HOSPADM

## 2021-05-20 RX ORDER — NALOXONE HYDROCHLORIDE 0.4 MG/ML
0.4 INJECTION, SOLUTION INTRAMUSCULAR; INTRAVENOUS; SUBCUTANEOUS
Status: DISCONTINUED | OUTPATIENT
Start: 2021-05-20 | End: 2021-05-20 | Stop reason: HOSPADM

## 2021-05-20 RX ADMIN — SODIUM CHLORIDE: 9 INJECTION, SOLUTION INTRAVENOUS at 09:55

## 2021-05-20 RX ADMIN — FENTANYL CITRATE 100 MCG: 50 INJECTION, SOLUTION INTRAMUSCULAR; INTRAVENOUS at 12:50

## 2021-05-20 RX ADMIN — LIDOCAINE HYDROCHLORIDE 7 ML: 10 INJECTION, SOLUTION EPIDURAL; INFILTRATION; INTRACAUDAL; PERINEURAL at 13:02

## 2021-05-20 RX ADMIN — MIDAZOLAM 2 MG: 1 INJECTION INTRAMUSCULAR; INTRAVENOUS at 12:50

## 2021-05-20 NOTE — PROGRESS NOTES
Pt tolerated oral intake. Discharge instructions reviewed, copy given to pt. Pt tolerated ambulation. Voided. FREDY cross'donte. Pt discharged home accompanied by wife.

## 2021-05-20 NOTE — PRE-PROCEDURE
GENERAL PRE-PROCEDURE:   Procedure:  CT guided biopsy of anterior mediastinal mass  Date/Time:  5/20/2021 11:37 AM    Verbal consent obtained?: Yes    Written consent obtained?: Yes    Risks and benefits: Risks, benefits and alternatives were discussed    DC Plan: Appropriate discharge home plan in place for patients who are going home after procedure   Consent given by:  Patient  Patient states understanding of procedure being performed: Yes    Patient's understanding of procedure matches consent: Yes    Procedure consent matches procedure scheduled: Yes    Expected level of sedation:  Moderate  Appropriately NPO:  Yes  ASA Class:  Class 2- mild systemic disease, no acute problems, no functional limitations  Mallampati  :  Grade 2- soft palate, base of uvula, tonsillar pillars, and portion of posterior pharyngeal wall visible  Lungs:  Lungs clear with good breath sounds bilaterally  Heart:  Normal heart sounds and rate  History & Physical reviewed:  History and physical reviewed and no updates needed  Statement of review:  I have reviewed the lab findings, diagnostic data, medications, and the plan for sedation

## 2021-05-20 NOTE — IP AVS SNAPSHOT
After Visit Summary Template Not Found    This Print Group is only intended to be used in the After Visit Summary and can only be used in a report that uses a released After Visit Summary Template.                       MRN:[id number]                      After Visit Summary   5/20/2021    Ifrah Huitron    MRN: [id number]           Visit Information        Department      5/20/2021  9:16 AM [facility] Interventional Radiology          Review of your medicines      UNREVIEWED medicines. Ask your doctor about these medicines       Dose / Directions   hydrocortisone 10 MG tablet  Commonly known as: CORTEF      Take 15 mg in the morning and 10 mg in the afternoon  Refills: 0     levothyroxine 75 MCG tablet  Commonly known as: SYNTHROID/LEVOTHROID      Dose: 75 mcg  Take 75 mcg by mouth every morning  Refills: 0     minocycline 50 MG capsule  Commonly known as: MINOCIN      Dose: 50 mg  Take 50 mg by mouth daily  Refills: 0     omeprazole 20 MG DR capsule  Commonly known as: priLOSEC      Dose: 20 mg  Take 20 mg by mouth  Refills: 0     triamcinolone 0.1 % external cream  Commonly known as: KENALOG      Apply topically 2 times daily  Refills: 0     Vitamin D-1000 Max St 25 MCG (1000 UT) Tabs  Generic drug: cholecalciferol      Dose: 1,000 Units  Take 1,000 Units by mouth daily  Refills: 0              Protect others around you: Learn how to safely use, store and throw away your medicines at www.disposemymeds.org.       Follow-ups after your visit       Care Instructions       Further instructions from your care team       [facility]    Interventional Radiology  Patient Instructions Following Chest Wall Biopsy    AFTER YOU GO HOME  ? If you were given sedation DO NOT drive or operate machinery at home or at work for at least 24 hours  ? DO relax and take it easy for 48 hours, no strenuous activity for 24 hours  ? DO drink plenty of fluids  ? DO resume your regular diet, unless otherwise  instructed by your Primary Physician  ? Keep the dressing dry and in place for 24 hours.  ? DO NOT SMOKE FOR AT LEAST 24 HOURS, if you have been given any medications that were to help you relax or sedate you during your procedure  ? DO NOT drink alcoholic beverages the day of your procedure  ? DO NOT do any strenuous exercise or lifting (> 10 lbs) for at least 3 days following your procedure  ? DO NOT take a bath or shower for at least 12 hours following your procedure  ? Remove dressing after shower the next day. Replace with Band aid for 2 days.  Never leave a wet dressing in place.  ? DO NOT make any important or legal decisions for 24 hours following your procedure  ? There should be minimum drainage from the biopsy site    CALL THE PHYSICIAN IF:  ? You start bleeding from the procedure site.  If you do start to bleed from that site, hold pressure on the site for a minimum of 10 minutes.  Your physician will tell you if you need to return to the hospital  ? You develop nausea or vomiting  ? You have excessive swelling, redness, or tenderness at the site  ? You have drainage that looks like it is infected.  ? You experience severe pain  ? You develop hives or a rash or unexplained itching  ? You develop shortness of breath  ? You develop a temperature of 101 degrees F or greater  ? You develop chest pain or cough up blood, lightheadedness or fainting    ADDITIONAL INSTRUCTIONS: none    [facility] INTERVENTIONAL RADIOLOGY DEPARTMENT  Procedure Physician:          Dr. Oconnor          Date of procedure: May 20, 2021  Telephone Numbers: [phone] Monday-Friday 8:00 am to 4:30 pm  [phone] After 4:30 pm Monday-Friday, Weekends & Holidays.   Ask for the Interventional Radiologist on call.  Someone is on call 24 hrs/day  [facility] toll free number: [phone] Monday-Friday 8:00 am to 4:30 pm  [facility] Emergency Dept: [phone]          Additional Information About Your Visit       MyChart Information    MyChart gives  you secure access to your electronic health record. If you see a primary care provider, you can also send messages to your care team and make appointments. If you have questions, please call your primary care clinic.  If you do not have a primary care provider, please call [phone] and they will assist you.       Care EveryWhere ID    This is your Care EveryWhere ID. This could be used by other organizations to access your [facility] medical records  [id number]       Your Vitals Were  Most recent update: 5/20/2021  1:11 PM    Blood Pressure   147/83      Pulse   65    Temperature   97.5  F (36.4  C) (Oral)    Respirations   14    Pulse Oximetry   96%          Primary Care Provider Office Phone # Fax #    Sukhdev Santino Kohler [phone]      Equal Access to Services    [facility] : Hadii jill wolfe hadasho Soomaali, waaxda luqadaha, qaybta kaalmada adeegyada, sabine velez . So [facility] [phone].    ATENCIÓN: Si habla español, tiene a marie disposición servicios gratuitos de asistencia lingüística. Llame al [phone].    We comply with applicable federal and state civil rights laws, including the [place] Human Rights Act. We do not discriminate on the basis of race, color, creed, Restorationist, national origin, marital status, age, disability, sex, sexual orientation, or gender identity.    If you would like an itemization of your charges they will now be available in [employer] 30 days after discharge. To access the itemized statements in [employer] go to billing/billing summary. From there select view account. There will be multiple tabs showing an overview of your account, detail, payments, and communications. From the communications tab you can see your monthly statements, your itemized statements, and any billing letters generated for your account. If you do not have a [employer] account and need help getting access please contact [employer] support at [phone].  If you  would prefer to have your itemized statements mailed please contact our automated itemized bill request line at [phone] option  2.       Thank you!    Thank you for choosing [facility] for your care. Our goal is always to provide you with excellent care. Hearing back from our patients is one way we can continue to improve our services. Please take a few minutes to complete the written survey that you may receive in the mail after you visit with us. Thank you!            Medication List      ASK your doctor about these medications          Morning Afternoon Evening Bedtime As Needed    hydrocortisone 10 MG tablet  Also known as: CORTEF  INSTRUCTIONS: Take 15 mg in the morning and 10 mg in the afternoon                     levothyroxine 75 MCG tablet  Also known as: SYNTHROID/LEVOTHROID  INSTRUCTIONS: Take 75 mcg by mouth every morning                     minocycline 50 MG capsule  Also known as: MINOCIN  INSTRUCTIONS: Take 50 mg by mouth daily                     omeprazole 20 MG DR capsule  Also known as: priLOSEC  INSTRUCTIONS: Take 20 mg by mouth                     triamcinolone 0.1 % external cream  Also known as: KENALOG  INSTRUCTIONS: Apply topically 2 times daily                     Vitamin D-1000 Max St 25 MCG (1000 UT) Tabs  INSTRUCTIONS: Take 1,000 Units by mouth daily  Generic drug: cholecalciferol

## 2021-05-20 NOTE — IR NOTE
Patient Name: Ifrah Huitron  Medical Record Number: [id number]  Today's Date: 5/20/2021    Procedure: CT Pleura Percutaneous Biopsy  Proceduralist: Sultana Alicea MD; Christian Sharp MD (On-Call Pager #[phone])    Patient in room: 1150  Procedure Start: 1218  Procedure end: 1257  Sedation medications administered: 2 mg midazolam, 100 mcg fentanyl    Report given to: Kerri GREEN on 2A at 1303  : n/a    Other Notes: Pt arrived to IR room CT 2 from 2A. Consent reviewed. Pt denies any questions or concerns regarding procedure. Pt positioned supine and monitored per protocol. Pt tolerated procedure without any noted complications. Pt transferred back to 2A.

## 2021-05-20 NOTE — PROCEDURES
[facility]    Procedure: CT guided biopsy of anterior mediastinal mass     Date/Time: 5/20/2021 1:03 PM  Performed by: Sultana Alicea MD  Authorized by: Sultana Alicea MD   IR Fellow Physician:  Radiology Resident Physician: Lila      UNIVERSAL PROTOCOL   Site Marked: Yes  Prior Images Obtained and Reviewed:  Yes  Required items: Required blood products, implants, devices and special equipment available    Patient identity confirmed:  Verbally with patient, arm band, provided demographic data and hospital-assigned identification number  Patient was reevaluated immediately before administering moderate or deep sedation or anesthesia  Confirmation Checklist:  Patient's identity using two indicators, relevant allergies, procedure was appropriate and matched the consent or emergent situation and correct equipment/implants were available  Time out: Immediately prior to the procedure a time out was called    Universal Protocol: the Joint Commission Universal Protocol was followed    Preparation: Patient was prepped and draped in usual sterile fashion    ESBL (mL):  2         ANESTHESIA    Anesthesia: Local infiltration  Local Anesthetic:  Lidocaine 1% without epinephrine  Anesthetic Total (mL):  8      SEDATION    Patient Sedated: Yes    Sedation Type:  Moderate (conscious) sedation  Sedation:  Fentanyl and midazolam  Vital signs: Vital signs monitored during sedation    See dictated procedure note for full details.  Findings: Anterior mediastinal soft tissue and necrotic mass.     Specimens: core needle biopsy specimens sent for pathological analysis, fluid and/or tissue for gram stain and culture and fluid and/or tissue for laboratory analysis and culture    Complications: None    Condition: Stable    Plan: Bedrest for one hour then discharge home.     PROCEDURE   Patient Tolerance:  Patient tolerated the procedure well with no immediate complications  Describe  Procedure: Six 18 gauge core biopsies submitted for pathological analysis. Two additional 18 gauge core biopsies submitted for gram stain and culture.   Length of time physician/provider present for 1:1 monitoring during sedation: 45

## 2021-05-20 NOTE — PROGRESS NOTES
Pt arrived on 2a post chest wall biopsy. VSS Ra. Dressing c/d/i. No pain. Family at bedside. Pt eating and drinking.

## 2021-05-20 NOTE — IP AVS SNAPSHOT
[facility] Interventional Radiology  [address] [address]  Phone: [phone]                                    After Visit Summary   5/20/2021    Ifrah Huitron    MRN: [id number]           After Visit Summary Signature Page    I have received my discharge instructions, and my questions have been answered. I have discussed any challenges I see with this plan with the nurse or doctor.    ..........................................................................................................................................  Patient/Patient Representative Signature      ..........................................................................................................................................  Patient Representative Print Name and Relationship to Patient    ..................................................               ................................................  Date                                   Time    ..........................................................................................................................................  Reviewed by Signature/Title    ...................................................              ..............................................  Date                                               Time          22EPIC Rev 08/18

## 2021-05-20 NOTE — DISCHARGE INSTRUCTIONS
[facility]    Interventional Radiology  Patient Instructions Following Chest Wall Biopsy    AFTER YOU GO HOME  ? If you were given sedation DO NOT drive or operate machinery at home or at work for at least 24 hours  ? DO relax and take it easy for 48 hours, no strenuous activity for 24 hours  ? DO drink plenty of fluids  ? DO resume your regular diet, unless otherwise instructed by your Primary Physician  ? Keep the dressing dry and in place for 24 hours.  ? DO NOT SMOKE FOR AT LEAST 24 HOURS, if you have been given any medications that were to help you relax or sedate you during your procedure  ? DO NOT drink alcoholic beverages the day of your procedure  ? DO NOT do any strenuous exercise or lifting (> 10 lbs) for at least 3 days following your procedure  ? DO NOT take a bath or shower for at least 12 hours following your procedure  ? Remove dressing after shower the next day. Replace with Band aid for 2 days.  Never leave a wet dressing in place.  ? DO NOT make any important or legal decisions for 24 hours following your procedure  ? There should be minimum drainage from the biopsy site    CALL THE PHYSICIAN IF:  ? You start bleeding from the procedure site.  If you do start to bleed from that site, hold pressure on the site for a minimum of 10 minutes.  Your physician will tell you if you need to return to the hospital  ? You develop nausea or vomiting  ? You have excessive swelling, redness, or tenderness at the site  ? You have drainage that looks like it is infected.  ? You experience severe pain  ? You develop hives or a rash or unexplained itching  ? You develop shortness of breath  ? You develop a temperature of 101 degrees F or greater  ? You develop chest pain or cough up blood, lightheadedness or fainting    ADDITIONAL INSTRUCTIONS: none    [facility] INTERVENTIONAL RADIOLOGY DEPARTMENT  Procedure Physician:          Dr. Oconnor          Date of procedure: May 20, 2021  Telephone  Numbers: [phone] Monday-Friday 8:00 am to 4:30 pm  [phone] After 4:30 pm Monday-Friday, Weekends & Holidays.   Ask for the Interventional Radiologist on call.  Someone is on call 24 hrs/day  [facility] toll free number: [phone] Monday-Friday 8:00 am to 4:30 pm  [facility] Emergency Dept: [phone]

## 2021-05-21 LAB — COPATH REPORT: NORMAL

## 2021-05-25 LAB
BACTERIA SPEC CULT: NO GROWTH
COPATH REPORT: NORMAL
SPECIMEN SOURCE: NORMAL

## 2021-05-26 DIAGNOSIS — C80.1 SPINDLE CELL CARCINOMA (H): Primary | ICD-10-CM

## 2021-05-26 PROCEDURE — 81445 SO NEO GSAP 5-50DNA/DNA&RNA: CPT | Performed by: INTERNAL MEDICINE

## 2021-05-26 PROCEDURE — G0452 MOLECULAR PATHOLOGY INTERPR: HCPCS | Mod: 26 | Performed by: PATHOLOGY

## 2021-05-27 DIAGNOSIS — J98.59 MEDIASTINAL MASS: Primary | ICD-10-CM

## 2021-05-27 DIAGNOSIS — C80.1 SPINDLE CELL CARCINOMA (H): ICD-10-CM

## 2021-05-27 LAB
BACTERIA SPEC CULT: NORMAL
Lab: NORMAL
SPECIMEN SOURCE: NORMAL

## 2021-05-27 NOTE — PROGRESS NOTES
I called Mr. Huitron to discuss the pathology results from a recent biopsy.    These demonstrated spindle cell cancer, a type of sarcoma.   I told Mr. Huitron that I placed a referral to talk further with Dr. Luke Wolff about this and what treatment options are available.   He appreciated the call.

## 2021-05-28 NOTE — TELEPHONE ENCOUNTER
RECORDS STATUS - ALL OTHER DIAGNOSIS      RECORDS RECEIVED FROM: [facility]   DATE RECEIVED: 5/28   NOTES STATUS DETAILS   OFFICE NOTE from referring provider [facility] Kaylin Orr APRN CNS in [facility]   OFFICE NOTE from medical oncologist     DISCHARGE SUMMARY from hospital [facility] 5/20/21, 4/29/21, 4/5/21, 3/10/21, 12/17/20   DISCHARGE REPORT from the ER NA    OPERATIVE REPORT Epic 5/20/21: CT Guided Bx  4/29/21: Abdominal Mass Bx  12/7/20, 7/31/14: Colonoscopy   MEDICATION LIST [facility]    CLINICAL TRIAL TREATMENTS TO DATE     LABS     PATHOLOGY REPORTS [facility] 5/20/21, 4/29/21: Surg Path   ANYTHING RELATED TO DIAGNOSIS Epic 4/29/21   GENONOMIC TESTING     TYPE: Epic 5/20/21: NGS   IMAGING (NEED IMAGES & REPORT)     CT SCANS PACS [facility]   MRI     MAMMO     ULTRASOUND     PET

## 2021-06-01 ENCOUNTER — PRE VISIT (OUTPATIENT)
Dept: ONCOLOGY | Facility: CLINIC | Age: 71
End: 2021-06-01

## 2021-06-01 ENCOUNTER — VIRTUAL VISIT (OUTPATIENT)
Dept: ONCOLOGY | Facility: CLINIC | Age: 71
End: 2021-06-01
Attending: CLINICAL NURSE SPECIALIST
Payer: MEDICARE

## 2021-06-01 DIAGNOSIS — M17.9 OSTEOARTHRITIS OF KNEE, UNSPECIFIED LATERALITY, UNSPECIFIED OSTEOARTHRITIS TYPE: ICD-10-CM

## 2021-06-01 DIAGNOSIS — Z88.0 PENICILLIN ALLERGY: ICD-10-CM

## 2021-06-01 DIAGNOSIS — E03.4 HYPOTHYROIDISM DUE TO ACQUIRED ATROPHY OF THYROID: ICD-10-CM

## 2021-06-01 DIAGNOSIS — K21.9 GASTROESOPHAGEAL REFLUX DISEASE WITHOUT ESOPHAGITIS: ICD-10-CM

## 2021-06-01 DIAGNOSIS — E23.0 HYPOPITUITARISM (H): Primary | ICD-10-CM

## 2021-06-01 DIAGNOSIS — J98.59 MEDIASTINAL MASS: ICD-10-CM

## 2021-06-01 DIAGNOSIS — C49.9 SPINDLE CELL SARCOMA (H): ICD-10-CM

## 2021-06-01 DIAGNOSIS — C80.1 SPINDLE CELL CARCINOMA (H): ICD-10-CM

## 2021-06-01 DIAGNOSIS — L71.9 ROSACEA: ICD-10-CM

## 2021-06-01 DIAGNOSIS — R49.0 HOARSENESS: ICD-10-CM

## 2021-06-01 DIAGNOSIS — R06.09 DOE (DYSPNEA ON EXERTION): ICD-10-CM

## 2021-06-01 DIAGNOSIS — E27.40 CHRONIC ADRENAL INSUFFICIENCY (H): ICD-10-CM

## 2021-06-01 PROCEDURE — 99205 OFFICE O/P NEW HI 60 MIN: CPT | Mod: 95 | Performed by: INTERNAL MEDICINE

## 2021-06-01 PROCEDURE — 999N001193 HC VIDEO/TELEPHONE VISIT; NO CHARGE

## 2021-06-01 NOTE — PROGRESS NOTES
Video start about 300  Video end about 355  Location of pt home  Provider [facility]      6-1-211    I saw Ifrah Huitron being referred for a tumor metastatic to the lung.  His history is somewhat complicated but in brief he began developing some low left back pain about the fall winter 5769-3083.  This is gradually increased in size and he has become more tired; this led to imaging showing several lung nodules and a biopsy showing a malignant tumor.  Right now the most bothersome thing is low left back pain which comes and goes but is always sensitive if he pushes.  Sometimes it wakes him up at night and he is taking an OTC aspirin caffeine compound for it.  He thinks the pain is a bit worse than it was a month ago although not dramatically so.  He has still been golfing and mowing the lawn those his activity level is clearly decreased.  He had an episode of WALKER 2 years ago that probably got a bit better and now he can walk up to floors but gets short of breath with that.  Eating is fine sleeping is not too bad.  However he wakes up 1-2 times a week from the pain.  About 9 days ago he developed hoarseness which is not improved and he has a bit of a nonproductive cough.  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  He controls GERD with Prilosec every third day.    His past medical history is notable for hypophyasectomyin 2010 and he is on replacement T4, cortisol.  He takes minocycline daily for rosacea and feels that as needed for GERD he has had a kidney stone in the past and has some DJD, and a hernia repair.  He describes an allergy to penicillin manifest as hives.  Family history is not particularly remarkable although his mother had lupus and his father had ALS and his sister has rheumatoid arthritis.  Social history reveals he is  with 5 adult children, is a never smoker and does not abuse alcohol or other drugs.  He still works as a  and has several hobbies building a  shareeage.  -    On exam he appeared comfortable with normal affect.  HEENT full EOMs  NECK no obvious goiter or mass  CHEST no respiratory distress  NEURO normal mentation and speech is  PSYCH Good mood    -  I reviewed his pathology on the biopsy showing a malignant tumor without a specific other than the descriptive diagnosis.  Notably it is a strong PD-L1 expresser.    Specimen #: [id number]   Collected: 5/20/2021   FINAL DIAGNOSIS:   Left pleural mass biopsy, CT guided:   - Malignant spindle cell neoplasm with necrosis (see comment)     COMMENT:   Special stains were performed with appropriate controls.  The tumor cells   are diffusely positive for CK   AE1/AE3 and CK MIRZA.  There is also focal to patchy staining for D2-40,   CD68, and WT-1.  INI1 is retained in   the tumor cells.  The Ki-67 labeling index is approximately 70%.  The   tumor cells show high PD-L1 expression   (TPS 90%).  No expression of P63, calretinin, DOG1, ALK1, , CK 5/6,   STAT6, SMA, SALL4, desmin, S100,   Myogenin, CD21, and CD23 is identified in the tumor cells.  These findings    together with tumor morphology and   location are most compatible with malignant sarcomatoid mesothelioma.  The    differential diagnosis includes   sarcomatoid carcinoma and sarcomas such as synovial sarcoma and   histiocytic sarcoma.  Additional tests are   pending and may help to classify this tumor.     -  I reviewed his various imaging studies showing growth in the left lower lobe mass between 3-29 5-20.    Formal reads:    Chest CT  IMPRESSION:  1. Multiple masses in the mediastinum, left hilar region, anterior  left pleural space, and left posterior subdiaphragmatic regions highly  suspicious for metastatic disease. Recommend CT of the abdomen and  pelvis to further evaluate for primary source. The left  subdiaphragmatic lesion appears to be invading the posterior  diaphragm.  2. Multiple subcentimeter low-attenuation lesions within the liver are  too  small to definitely characterize and are indeterminate.  3. Small left pleural effusion.    CT-AP  IMPRESSION:   1.  Large left upper quadrant mass/neoplasm appears to invade the left hemidiaphragm and most likely a metastatic lesion. This abuts, but does not appear to originate from the spleen. This would be amenable for percutaneous biopsy.  2.  Small left pleural effusion is likely reactive secondary to the mass.  3.  Hypodense liver lesions are unchanged and should represent cysts.  4.  Prostatic hypertrophy.    -  We discussed nature of cancer in general and his tumor in particular including the lack of a truly specific diagnosis.  We discussed we could treat this like a sarcoma but the high PD-L1 expression makes it attractive to consider a PD-L1 agent.  We will start the process for approval of that now and at the same time start approval for second line for Doxil ifosfamide although we could use other agents such as Gemzar or pazopanib among others.  We discussed the serious nature of the problem and will refer to palliative for pain control.  We will also refer to ENT to see if they can do anything about his hoarseness which could be due to tumors in the chest.    His other diagnoses include panhypopituitarism and will be continuing those medications, the rosacea; he will continue minocycline, the GERD; he will continue as needed Prilosec.  The diagnoses of DJD, history of kidney stones and penicillin allergy are noted.      We will also want to send his tumor for foundation 1 and will do that as soon as possible.      We would like to get this started as soon as possible and [place]      All their question addressed and will call if others arise.  T>60 min including chart/image review and orders.    Luke Wolff M.D.  Professor  Hematology, Oncology and Transplantation

## 2021-06-01 NOTE — LETTER
6/1/2021         RE: Ifrah Huitron  [address] [place] [address]        Dear Colleague,    Thank you for referring your patient, Ifrah Huitron, to the [facility]. Please see a copy of my visit note below.      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  His history is somewhat complicated but in brief he began developing some low left back pain about the fall winter 1622-2433.  This is gradually increased in size and he has become more tired; this led to imaging showing several lung nodules and a biopsy showing a malignant tumor.  Right now the most bothersome thing is low left back pain which comes and goes but is always sensitive if he pushes.  Sometimes it wakes him up at night and he is taking an OTC aspirin caffeine compound for it.  He thinks the pain is a bit worse than it was a month ago although not dramatically so.  He has still been golfing and mowing the lawn those his activity level is clearly decreased.  He had an episode of WALKER 2 years ago that probably got a bit better and now he can walk up to floors but gets short of breath with that.  Eating is fine sleeping is not too bad.  However he wakes up 1-2 times a week from the pain.  About 9 days ago he developed hoarseness which is not improved and he has a bit of a nonproductive cough.  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  He controls GERD with Prilosec every third day.    His past medical history is notable for hypophyasectomyin 2010 and he is on replacement T4, cortisol.  He takes minocycline daily for rosacea and feels that as needed for GERD he has had a kidney stone in the past and has some DJD, and a hernia repair.  He describes an allergy to penicillin manifest as hives.  Family history is not particularly remarkable although his mother had lupus and his  father had ALS and his sister has rheumatoid arthritis.  Social history reveals he is  with 5 adult children, is a never smoker and does not abuse alcohol or other drugs.  He still works as a  and has several hobbies building a garage.  -    On exam he appeared comfortable with normal affect.  HEENT full EOMs  NECK no obvious goiter or mass  CHEST no respiratory distress  NEURO normal mentation and speech is  PSYCH Good mood    -  I reviewed his pathology on the biopsy showing a malignant tumor without a specific other than the descriptive diagnosis.  Notably it is a strong PD-L1 expresser.    Specimen #: [id number]   Collected: 5/20/2021   FINAL DIAGNOSIS:   Left pleural mass biopsy, CT guided:   - Malignant spindle cell neoplasm with necrosis (see comment)     COMMENT:   Special stains were performed with appropriate controls.  The tumor cells   are diffusely positive for CK   AE1/AE3 and CK MIRZA.  There is also focal to patchy staining for D2-40,   CD68, and WT-1.  INI1 is retained in   the tumor cells.  The Ki-67 labeling index is approximately 70%.  The   tumor cells show high PD-L1 expression   (TPS 90%).  No expression of P63, calretinin, DOG1, ALK1, , CK 5/6,   STAT6, SMA, SALL4, desmin, S100,   Myogenin, CD21, and CD23 is identified in the tumor cells.  These findings    together with tumor morphology and   location are most compatible with malignant sarcomatoid mesothelioma.  The    differential diagnosis includes   sarcomatoid carcinoma and sarcomas such as synovial sarcoma and   histiocytic sarcoma.  Additional tests are   pending and may help to classify this tumor.     -  I reviewed his various imaging studies showing growth in the left lower lobe mass between 3-29 5-20.    Formal reads:    Chest CT  IMPRESSION:  1. Multiple masses in the mediastinum, left hilar region, anterior  left pleural space, and left posterior subdiaphragmatic regions highly  suspicious for  metastatic disease. Recommend CT of the abdomen and  pelvis to further evaluate for primary source. The left  subdiaphragmatic lesion appears to be invading the posterior  diaphragm.  2. Multiple subcentimeter low-attenuation lesions within the liver are  too small to definitely characterize and are indeterminate.  3. Small left pleural effusion.    CT-AP  IMPRESSION:   1.  Large left upper quadrant mass/neoplasm appears to invade the left hemidiaphragm and most likely a metastatic lesion. This abuts, but does not appear to originate from the spleen. This would be amenable for percutaneous biopsy.  2.  Small left pleural effusion is likely reactive secondary to the mass.  3.  Hypodense liver lesions are unchanged and should represent cysts.  4.  Prostatic hypertrophy.    -  We discussed nature of cancer in general and his tumor in particular including the lack of a truly specific diagnosis.  We discussed we could treat this like a sarcoma but the high PD-L1 expression makes it attractive to consider a PD-L1 agent.  We will start the process for approval of that now and at the same time start approval for second line for Doxil ifosfamide although we could use other agents such as Gemzar or pazopanib among others.  We discussed the serious nature of the problem and will refer to palliative for pain control.  We will also refer to ENT to see if they can do anything about his hoarseness which could be due to tumors in the chest.    His other diagnoses include panhypopituitarism and will be continuing those medications, the rosacea; he will continue minocycline, the GERD; he will continue as needed Prilosec.  The diagnoses of DJD, history of kidney stones and penicillin allergy are noted.      We will also want to send his tumor for foundation 1 and will do that as soon as possible.      We would like to get this started as soon as possible and [place]      All  their question addressed and will call if others arise.  T>60 min including chart/image review and orders.    Luke Wolff M.D.  Professor  Hematology, Oncology and Transplantation        Ifrah is a 70 year old who is being evaluated via a billable video visit.      How would you like to obtain your AVS? MyChart  If the video visit is dropped, the invitation should be resent by: Text to cell phone: [phone]  Will anyone else be joining your video visit? No      Video Start [id number]  Video-Visit Details    Type of service:  Video Visit    Video End [id number]    Originating Location (pt. Locationhome    Distant Location (provider location):  [facility]     Platform used for Video Visit:[employer]      Again, thank you for allowing me to participate in the care of your patient.        Sincerely,        Luke Wolff MD

## 2021-06-01 NOTE — PROGRESS NOTES
Ifrah is a 70 year old who is being evaluated via a billable video visit.      How would you like to obtain your AVS? [employer]  If the video visit is dropped, the invitation should be resent by: Text to cell phone: [phone]  Will anyone else be joining your video visit? No      Video Start [id number]  Video-Visit Details    Type of service:  Video Visit    Video End [id number]    Originating Location (pt. Locationhome    Distant Location (provider location):  [facility]     Platform used for Video Visit:[employer]

## 2021-06-02 ENCOUNTER — RECORDS - HEALTHEAST (OUTPATIENT)
Dept: ADMINISTRATIVE | Facility: CLINIC | Age: 71
End: 2021-06-02

## 2021-06-02 LAB — COPATH REPORT: NORMAL

## 2021-06-03 LAB — COPATH REPORT: NORMAL

## 2021-06-04 PROBLEM — C45.9: Status: ACTIVE | Noted: 2021-06-04

## 2021-06-04 LAB — COPATH REPORT: NORMAL

## 2021-06-04 NOTE — TELEPHONE ENCOUNTER
FUTURE VISIT INFORMATION      FUTURE VISIT INFORMATION:    Date: 8/12/21    Time: 1 PM with SLP    [place] [facility] [place]  REFERRAL INFORMATION:    Referring provider:  Dr. Luke Wolff    Referring providers clinic:  [facility] - Oncology    Reason for visit/diagnosis: Hoarseness    RECORDS REQUESTED FROM:       Clinic name Comments Records Status Imaging Status   [facility] ([facility]) 6/8/21, 3/29/21 - PCC OV with Dr. Kohler  5/23/19 - ED OV with Dr. Aaron Durham    [facility] 6/1/21 - ONC OV with Dr. Wolff  4/7/21 - PULM OV with Dr. Herminia Durham    [facility] 4/29/21 - ED OV with Dr. Herminia Durham    [facility] - Imaging 3/26/21 - CT Chest  3/23/21 - XR Chest  5/23/19 - CTA Head Neck  5/23/19 - CT Chest/Abd/Pelvis [facility]   [facility] 10/29/19 - CARDIO OV with Dr. Ana Ambriz [facility] 10/22/19 - [facility]

## 2021-06-08 ENCOUNTER — OFFICE VISIT (OUTPATIENT)
Dept: FAMILY MEDICINE | Facility: CLINIC | Age: 71
End: 2021-06-08
Payer: MEDICARE

## 2021-06-08 ENCOUNTER — PATIENT OUTREACH (OUTPATIENT)
Dept: ONCOLOGY | Facility: CLINIC | Age: 71
End: 2021-06-08

## 2021-06-08 VITALS
HEART RATE: 98 BPM | RESPIRATION RATE: 14 BRPM | DIASTOLIC BLOOD PRESSURE: 80 MMHG | OXYGEN SATURATION: 96 % | WEIGHT: 158.4 LBS | HEIGHT: 67 IN | TEMPERATURE: 97.9 F | BODY MASS INDEX: 24.86 KG/M2 | SYSTOLIC BLOOD PRESSURE: 138 MMHG

## 2021-06-08 DIAGNOSIS — R07.0 THROAT PAIN: ICD-10-CM

## 2021-06-08 DIAGNOSIS — R49.0 HOARSENESS: Primary | ICD-10-CM

## 2021-06-08 DIAGNOSIS — C45.9 MESOTHELIOMA, MALIGNANT (H): ICD-10-CM

## 2021-06-08 DIAGNOSIS — C49.9 SPINDLE CELL SARCOMA (H): ICD-10-CM

## 2021-06-08 PROCEDURE — 99214 OFFICE O/P EST MOD 30 MIN: CPT | Performed by: FAMILY MEDICINE

## 2021-06-08 ASSESSMENT — MIFFLIN-ST. JEOR: SCORE: 1419.19

## 2021-06-08 NOTE — PROGRESS NOTES
Spoke with Ifrah to discuss chemotherapy and resources available at the [facility].  Provided patient with [employer] printouts on keytruda  Reviewed administration, side effects (including anemia, fatigue, hyperglycemia, hyponatremia, hypoalbuminemia, itching, cough, fatigue) ongoing symptom management by MARLEEN's in clinic.  Discussed A serious, but uncommon side effect of Keytruda  may be an immune-mediated reaction. When this side effect occurs, it affects primarily the bowels, liver, skin, nerves and the endocrine system. This immune reaction can occur during treatment but can also be seen weeks or months after discontinuation of treatment. Symptoms of this reaction will be monitored throughout treatment (diarrhea, rash, and neuropathy). Lab work will check for elevated liver enzymes and thyroid function.  Provided phone numbers for triage and after hours care  Discussed that chemo treatment may be delayed due to blood counts, infusion schedule, or patient's need to modify.  Discussed port options-will not need port for this treatment.  Will call back if he changes his mind.  Discussed appropriate use of MyChart, specifically not sending symptom messages or urgent concerns.

## 2021-06-08 NOTE — PATIENT INSTRUCTIONS
No sign of infection today.    ENT evaluation is needed to help with determining cause of your symptoms.    Follow up with oncology as planned.

## 2021-06-08 NOTE — PROGRESS NOTES
Assessment & Plan     Hoarseness  - OTOLARYNGOLOGY REFERRAL    Throat pain  - OTOLARYNGOLOGY REFERRAL    Spindle cell sarcoma (H)  - OTOLARYNGOLOGY REFERRAL    Mesothelioma, malignant (H)  - OTOLARYNGOLOGY REFERRAL    Patient's voice change and throat discomfort are suspect for vocal cord or laryngeal involvement in his currently diagnosed cancer.  No sign of infectious process today.   Discussed with patient best initial evaluation for his symptoms include visualization of his vocal cords.   No palpable mass on neck, so will defer CT soft tissue neck until seen by ENT or if patient has progressive symptoms in next coming days.  CMA will contact oncology clinic to get update on plan for patient as patient is unaware of successive plans for his conditions after his  VV with  Oncologist 7 days ago.  Since patient's ENT appointment is not until 2 months from now, referral to local ENT placed to at least visualize his vocal cords sooner. Concern for development of sudden obstruction if he has a relatively progressive mass.  Reasons to go to ER discussed in detail with patient.     Patient Instructions   No sign of infection today.    ENT evaluation is needed to help with determining cause of your symptoms.    Follow up with oncology as planned.      Return in about 1 week (around 6/15/2021) for In-clinic visit for ENT for consult.    Sukhdev Kohler MD  [facility]    Viktoria Rg is a 70 year old who presents for the following health issues:  Chief Complaint   Patient presents with     Throat Problem     Pt here for ongoing laryingitis.       HPI     Concern - laryingitis  Onset: 3 wks ago  Description:  Patient  has had laryingitis for the past 3 wks, sore on the right side of his throat.  Intensity: moderate  Progression of Symptoms:  same and constant  Accompanying Signs & Symptoms: little better in the mornings, worse when he talks, soreness on right side of throat, last night pt  had numbness in bottom of feet, unsure if related  Previous history of similar problem: none  Precipitating factors:        Worsened by: talking  Alleviating factors:        Improved by:  none  Therapies tried and outcome: throat lozenges - does not help  Patient  wondering if this might be related to dental procedure he had done in March.    Patient states he has experienced short mild bouts of cold sweats and chills.    Has current diagnosis of mediastinal mass/cancer with mets. Has started to see oncology and pulmonology. Awaiting other plans. Referred to ENT but patient said appt is in 2 months at the  of .    Patient Active Problem List   Diagnosis     Calculus of kidney     Degeneration of lumbar or lumbosacral intervertebral disc     Eczema     Epidural lipomatosis     TUYET (generalized anxiety disorder)     Hypopituitarism (H)     Hypothyroidism     Osteoarthritis of spine with radiculopathy, lumbar region     Pituitary macroadenoma (H)     Rosacea     Chronic lower back pain     Spindle cell sarcoma (H)     Mesothelioma, malignant (H)     Past Surgical History:   Procedure Laterality Date     COLONOSCOPY N/A 12/17/2020    Procedure: COLONOSCOPY;  Surgeon: Ken Camacho MD;  Location: [facility]     ENT SURGERY       HERNIA REPAIR       PHACOEMULSIFICATION WITH STANDARD INTRAOCULAR LENS IMPLANT Right 3/10/2021    Procedure: Cataract removal with implant.;  Surgeon: Jamir Mac MD;  Location: [facility]     PHACOEMULSIFICATION WITH STANDARD INTRAOCULAR LENS IMPLANT Left 4/5/2021    Procedure: Cataract removal with implant.;  Surgeon: Jamir Mac MD;  Location: [facility]     tooth pulled 4/7  Right        Social History     Tobacco Use     Smoking status: Never Smoker     Smokeless tobacco: Never Used   Substance Use Topics     Alcohol use: Yes     Comment: rare     Family History   Problem Relation Age of Onset     Lupus Mother      ALS Father      Rheumatoid Arthritis Sister          Current  "Outpatient Medications   Medication Sig Dispense Refill     cholecalciferol (VITAMIN D-1000 MAX ST) 1000 units TABS Take 1,000 Units by mouth daily        hydrocortisone (CORTEF) 10 MG tablet Take 15 mg in the morning and 10 mg in the afternoon       levothyroxine (SYNTHROID/LEVOTHROID) 75 MCG tablet Take 75 mcg by mouth every morning        minocycline (MINOCIN/DYNACIN) 50 MG capsule Take 50 mg by mouth daily        omeprazole (PRILOSEC) 20 MG DR capsule Take 20 mg by mouth       triamcinolone (KENALOG) 0.1 % external cream Apply topically 2 times daily        Allergies   Allergen Reactions     Penicillins Other (See Comments) and Hives     swelling, hives       Review of Systems   Constitutional, HEENT, cardiovascular, pulmonary, GI, , musculoskeletal, neuro, skin, endocrine and psych systems are negative, except as otherwise noted.      Objective    /80   Pulse 98   Temp 97.9  F (36.6  C) (Tympanic)   Resp 14   Ht 1.689 m (5' 6.5\")   Wt 71.8 kg (158 lb 6.4 oz)   SpO2 96%   BMI 25.18 kg/m    Body mass index is 25.18 kg/m .  Physical Exam   GENERAL: alert and no distress, hoarse voice but not airy  EYES: pink conjunctivae, no icterus  NECK: no palpable mass  HEENT: tympanic membrane intact and pearly bilaterally, nose with no congestion, no sinus tenderness, throat not erythematous and no visible mass, tonsils not enlarged with no exudates, no oral ulcers  RESP: lungs clear to auscultation - no rales, no rhonchi, no wheezes  CV: regular rates and rhythm, normal S1 S2, no S3 or S4 and no murmur  SKIN:  Good turgor, no rashes    No results found for any visits on 06/08/21.        "

## 2021-06-08 NOTE — PROGRESS NOTES
"Ifrah is a 70 year old who is being evaluated via a billable video visit.      How would you like to obtain your AVS? MyChart  If the video visit is dropped, the invitation should be resent by: Send to e-mail at: [email or web address]  Will anyone else be joining your video visit? No     Vitals - Patient Reported  Weight (Patient Reported): 32.6 kg (71 lb 12.8 oz)  Height (Patient Reported): 168.9 cm (5' 6.5\")  BMI (Based on Pt Reported Ht/Wt): 11.42  Pain Score: Mild Pain (2)  Pain Loc: Other - see comment(left part of torso)    Joselin DELATORRE        Video Start Time: 3:00  Video-Visit Details    Type of service:  Video Visit    Video End Time:4:05 PM    Originating Location (pt. Location): Home    Distant Location (provider location):  [facility]     Platform used for Video Visit: [facility]    Palliative Care Outpatient Clinic Consultation Note    Patient:  Ifrah Huitron    Chief Complaint:   Ifrah Huitron 70 year old male who is presenting to the palliative medicine clinic today at the request of Dr. Luke Wolff for a palliative care consultation secondary to symptoms related to .   The patient's primary care provider is Sukhdev Kohler MD.     History of Present Illness:  Pain in breast bone into left lung area and back into his shoulder. Once it started to bother him in his shoulder and woke him up at night, went in to be seen.  He thinks it maybe started last fall.  Got much worse in the spring.  Notes that some days are good, but more often has bad days.  Wakes him up at night, sharp, radiatin back into his shoulder.    No strength/energy compared to previously.  Working on building a garage - can't really do more than 1 hour of work at a time if that.    \"Back and body\" over the counter medication, aspirin and caffeine, and that seems to help.  Also using Guerrero Reich, and that helps \"take the edge off.\"  Also rests a lot. Uses ice packs, especially in lower back - this " "deadens it and seems to help. Also helps to get up and move around.    Did have back pain in the past - was prescribed oxycodone.  After a few months tired tapering, then discontinued on his own went through \"withdrawal\" and ended up going into the hospital.  Stopped it because he didn't like symptoms that sound like dysphoria.      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  Symptoms started with left sided low back pain which eventually led to imaging - found to have what appears to be metastatic malignancy.  Biopsy confirmed in April, seen by Dr. Wolff in early June with plan for palliative chemotherapy, pembolizumab with other options discussed for further down the road.    Patient's Disease Understanding: knows he will die from this.  Hopes the chemotherapy will shrink or slow down the cancer so he can have as much time as possible with family, as many good days as possible.    Coping:  New diagnosis, still processing. Hasn't told his kids yet.    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 so bought land, built   Hobbies: golfs 2-3 times per week, played 18 holes, building garage  Substance Use/History of misuse: none  Financial Concerns: none, covered by Medicare, insurance  Spiritual Background: Buddhism background; dad took him to every [place], stuck with Catholicism  Spiritual Concerns/Needs: none, goes to [employer]     Social History     Tobacco Use     Smoking status: Never Smoker     Smokeless tobacco: Never Used   Substance Use Topics     Alcohol use: Yes     Comment: rare     Drug use: Never       Family History  Family History   Problem Relation Age of Onset     Lupus Mother      ALS " "Father      Rheumatoid Arthritis Sister      Patient's Involvement with Prior History of Serious Illness in Family: mom had cancer in addition to multiple chronic illnesses.  \"She decided when it was time to go, and I really respected that.\"   on hospice, he was the one who \"gave her the morphine at the end.\"  Father with ALS, bedridden for over 20 years - pain \"7 or 8 every day\" with [facility] drugs [facility] could [facility].  But he kept positive.\"    Advance Care Planning:  Advance Directive:    None  Where is written copy located: n/a  Health Care Agent Contact Information: n/a  POLST:   none    Allergies   Allergen Reactions     Penicillins Other (See Comments) and Hives     swelling, hives       Current Outpatient Medications   Medication Sig Dispense Refill     cholecalciferol (VITAMIN D-1000 MAX ST) 1000 units TABS Take 1,000 Units by mouth daily        hydrocortisone (CORTEF) 10 MG tablet Take 15 mg in the morning and 10 mg in the afternoon       levothyroxine (SYNTHROID/LEVOTHROID) 75 MCG tablet Take 75 mcg by mouth every morning        minocycline (MINOCIN/DYNACIN) 50 MG capsule Take 50 mg by mouth daily        omeprazole (PRILOSEC) 20 MG DR capsule Take 20 mg by mouth       triamcinolone (KENALOG) 0.1 % external cream Apply topically 2 times daily        Past Medical History:   Diagnosis Date     Arthritis      Mesothelioma, malignant (H) 2021     Spindle cell sarcoma (H) 2021     Thyroid disease     removed pituitary gland     Past Surgical History:   Procedure Laterality Date     COLONOSCOPY N/A 2020    Procedure: COLONOSCOPY;  Surgeon: Ken Camacho MD;  Location: [facility]     ENT SURGERY       HERNIA REPAIR       PHACOEMULSIFICATION WITH STANDARD INTRAOCULAR LENS IMPLANT Right 3/10/2021    Procedure: Cataract removal with implant.;  Surgeon: Jamir Mac MD;  Location: [facility]     PHACOEMULSIFICATION WITH STANDARD INTRAOCULAR LENS IMPLANT Left 2021    Procedure: " "Cataract removal with implant.;  Surgeon: Jamir Mac MD;  Location: [facility]     tooth pulled 4/7  Right        REVIEW OF SYSTEMS:   ROS: 10 point ROS neg other than the symptoms noted above in the HPI and here:  Palliative Symptom Review (0=no symptom/no concern, 1=mild, 2=moderate, 3=severe):      Pain: 2      Fatigue: 1      Nausea: 0      Constipation: 0      Diarrhea: 0      Depressive Symptoms: 0      Anxiety: 0      Drowsiness: 0      Poor Appetite: 0      Shortness of Breath: 0      Insomnia: 0      Other: 0      Overall (0 good/no concerns, 3 very poor):  1    Exam via video:    Gen alert, comfortable appearing, NAD. Sitting at table at home, his wife Abida at his side  Head NCAT.  Eyes anicteric without injection  Face symmetric, eyes conjugate  Mouth pink, moist appearing  Lungs unlabored, no cough, speaking full sentences  Skin no rashes or lesions evident on face/neck  Neuro Face symmetric, eyes conjugate; speech fluent.  Neuropsych exam normal including affect, sensorium, gross memory, thought processes, and fund of knowledge.     Data Reviewed:  LABS: 4/29/21 normal CBC; 3/29/21 normal CMP except mild glucose elevation, albumin 3.3      IMAGING: CT scan CT and AbdPelvis late March 2021:  Pertinent findings:  \"1.  Large left upper quadrant mass/neoplasm appears to invade the left hemidiaphragm and most likely a metastatic lesion. This abuts, but does not appear to originate from the spleen. This would be amenable for percutaneous biopsy.  2.  Small left pleural effusion is likely reactive secondary to the mass.\"  and  \"1. Multiple masses in the mediastinum, left hilar region, anterior left pleural space, and left posterior subdiaphragmatic regions highly suspicious for metastatic disease. Recommend CT of the abdomen and pelvis to further evaluate for primary source. 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  Has pain in sternum and pain likely referred from his subdiaphragmatic mass.  Will start with scheduled acetaminophen/celebrex and prn opioid pain medication.  Given dysphoria with oxycodone and desire to start with low dose prn option, will do hydromorphone with early follow up to see if need to increase regimen.    Recommendations & Counselin.  Acetaminophen 1000 TID  +  Celebrex 100 BID  2.  Hydromorphone 1-2mg TID prn severe pain  3. Will ask palliative RN to reach out to patient in about 1 week to reassess pain/function  4.  Follow up with Sal Handy MD, in 4 weeks    Patient education: discussed risks and benefits, side effects of opioid pain medication and explained that we will work with him to find optimal management plan      Silvia Paniagua MD, *[id number]  Palliative Medicine Fellow  Patient staffed with Sal Handy MD, Staff, Palliative Medicine        Attending Note:  Patient seen and evaluated with Dr Paniagua and I agree with/confirm their findings/recs in this note.      Thank you for involving us in the patient's care.   Sal Handy MD / Palliative Medicine / Text me via [facility].

## 2021-06-10 ENCOUNTER — VIRTUAL VISIT (OUTPATIENT)
Dept: PALLIATIVE CARE | Facility: CLINIC | Age: 71
End: 2021-06-10
Attending: INTERNAL MEDICINE
Payer: MEDICARE

## 2021-06-10 DIAGNOSIS — C49.9 SPINDLE CELL SARCOMA (H): ICD-10-CM

## 2021-06-10 DIAGNOSIS — G89.3 CANCER ASSOCIATED PAIN: Primary | ICD-10-CM

## 2021-06-10 PROCEDURE — 999N001193 HC VIDEO/TELEPHONE VISIT; NO CHARGE

## 2021-06-10 PROCEDURE — 99204 OFFICE O/P NEW MOD 45 MIN: CPT | Mod: 95 | Performed by: INTERNAL MEDICINE

## 2021-06-10 RX ORDER — CELECOXIB 100 MG/1
100 CAPSULE ORAL 2 TIMES DAILY
Qty: 60 CAPSULE | Refills: 3 | Status: SHIPPED | OUTPATIENT
Start: 2021-06-10 | End: 2021-07-08

## 2021-06-10 RX ORDER — HYDROMORPHONE HYDROCHLORIDE 2 MG/1
1-2 TABLET ORAL 3 TIMES DAILY PRN
Qty: 14 TABLET | Refills: 0 | Status: SHIPPED | OUTPATIENT
Start: 2021-06-10 | End: 2021-06-23

## 2021-06-10 RX ORDER — ACETAMINOPHEN 500 MG
1000 TABLET ORAL 3 TIMES DAILY
Qty: 180 TABLET | Refills: 1 | Status: SHIPPED | OUTPATIENT
Start: 2021-06-10 | End: 2021-07-08

## 2021-06-10 NOTE — PATIENT INSTRUCTIONS
Thank you for coming to your virtual visit in the Palliative Care Clinic today.      1. As we discussed, start taking Extra Strength Tylenol (500mg tablets), 2 pills, three times per day.  Also take Celebrex 100mg, 1 pill twice a day.  Stop the Back and Body.  2.  We also filled a prescription for hydromorphone, an opioid, to take when the pain is really bad.  You can take 1/2 to 1 pill up to three times a day as needed for severe pain.  3.  Our clinic nurse, Nora, will call in about a week to make sure your pain is better controlled, and if not, she can work with Dr. Handy to make new recommendations.    Return in clinic in 4 weeks for a follow-up with Dr. Handy.  Someone from the clinic will call you to schedule that visit.     You can reach the Palliative Care Team during business hours at the following numbers:   -For the [facility], call [phone]  -To reach the palliative RN for questions or refills, call [phone]       To reach the Palliative Care Provider on-call after-hours or on holidays and weekends, call: [phone].  Please note that we are not able to provide pain medication refills on evenings or weekends.

## 2021-06-10 NOTE — LETTER
"6/10/2021       RE: Ifrah Huitron  [address] [place] [address]     Dear Colleague,    Thank you for referring your patient, Ifrah Huitron, to the [facility]INIC at [facility]. Please see a copy of my visit note below.    fIrah is a 70 year old who is being evaluated via a billable video visit.      How would you like to obtain your AVS? MyChart  If the video visit is dropped, the invitation should be resent by: Send to e-mail at: [email or web address]  Will anyone else be joining your video visit? No     Vitals - Patient Reported  Weight (Patient Reported): 32.6 kg (71 lb 12.8 oz)  Height (Patient Reported): 168.9 cm (5' 6.5\")  BMI (Based on Pt Reported Ht/Wt): 11.42  Pain Score: Mild Pain (2)  Pain Loc: Other - see comment(left part of torso)    Joselin DELATORRE        Video Start Time: 3:00  Video-Visit Details    Type of service:  Video Visit    Video End Time:4:05 PM    Originating Location (pt. Location): Home    Distant Location (provider location):  [facility]     Platform used for Video Visit: [facility]    Palliative Care Outpatient Clinic Consultation Note    Patient:  Ifrah Huitron    Chief Complaint:   Ifrah Huitron 70 year old male who is presenting to the palliative medicine clinic today at the request of Dr. Luke Wolff for a palliative care consultation secondary to symptoms related to .   The patient's primary care provider is Sukhdev Kohler MD.     History of Present Illness:  Pain in breast bone into left lung area and back into his shoulder. Once it started to bother him in his shoulder and woke him up at night, went in to be seen.  He thinks it maybe started last fall.  Got much worse in the spring.  Notes that some days are good, but more often has bad days.  Wakes him up at night, sharp, radiatin back into his shoulder.    No strength/energy compared to previously.  Working on " "building a garage - can't really do more than 1 hour of work at a time if that.    \"Back and body\" over the counter medication, aspirin and caffeine, and that seems to help.  Also using Guerrero Reich, and that helps \"take the edge off.\"  Also rests a lot. Uses ice packs, especially in lower back - this deadens it and seems to help. Also helps to get up and move around.    Did have back pain in the past - was prescribed oxycodone.  After a few months tired tapering, then discontinued on his own went through \"withdrawal\" and ended up going into the hospital.  Stopped it because he didn't like symptoms that sound like dysphoria.      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  Symptoms started with left sided low back pain which eventually led to imaging - found to have what appears to be metastatic malignancy.  Biopsy confirmed in April, seen by Dr. Wolff in early June with plan for palliative chemotherapy, pembolizumab with other options discussed for further down the road.    Patient's Disease Understanding: knows he will die from this.  Hopes the chemotherapy will shrink or slow down the cancer so he can have as much time as possible with family, as many good days as possible.    Coping:  New diagnosis, still processing. Hasn't told his kids yet.    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 so bought land, built   Hobbies: golfs 2-3 times per week, played 18 holes, building garage  Substance Use/History of misuse: none  Financial Concerns: none, covered by Medicare, insurance  Spiritual Background: Church background; dad took him to every [employer], stuck with Catholicism  Spiritual Concerns/Needs: none, goes to " "[employer]     Social History     Tobacco Use     Smoking status: Never Smoker     Smokeless tobacco: Never Used   Substance Use Topics     Alcohol use: Yes     Comment: rare     Drug use: Never       Family History  Family History   Problem Relation Age of Onset     Lupus Mother      ALS Father      Rheumatoid Arthritis Sister      Patient's Involvement with Prior History of Serious Illness in Family: mom had cancer in addition to multiple chronic illnesses.  \"She decided when it was time to go, and I really respected that.\"   on hospice, he was the one who \"gave her the morphine at the end.\"  Father with ALS, bedridden for over 20 years - pain \"7 or 8 every day\" with [facility].  But he kept positive.\"    Advance Care Planning:  Advance Directive:    None  Where is written copy located: n/a  Health Care Agent Contact Information: n/a  POLST:   none    Allergies   Allergen Reactions     Penicillins Other (See Comments) and Hives     swelling, hives       Current Outpatient Medications   Medication Sig Dispense Refill     cholecalciferol (VITAMIN D-1000 MAX ST) 1000 units TABS Take 1,000 Units by mouth daily        hydrocortisone (CORTEF) 10 MG tablet Take 15 mg in the morning and 10 mg in the afternoon       levothyroxine (SYNTHROID/LEVOTHROID) 75 MCG tablet Take 75 mcg by mouth every morning        minocycline (MINOCIN/DYNACIN) 50 MG capsule Take 50 mg by mouth daily        omeprazole (PRILOSEC) 20 MG DR capsule Take 20 mg by mouth       triamcinolone (KENALOG) 0.1 % external cream Apply topically 2 times daily        Past Medical History:   Diagnosis Date     Arthritis      Mesothelioma, malignant (H) 2021     Spindle cell sarcoma (H) 2021     Thyroid disease     removed pituitary gland     Past Surgical History:   Procedure Laterality Date     COLONOSCOPY N/A 2020    Procedure: COLONOSCOPY;  Surgeon: Ken Camacho MD;  Location: [facility]     ENT SURGERY       " "HERNIA REPAIR       PHACOEMULSIFICATION WITH STANDARD INTRAOCULAR LENS IMPLANT Right 3/10/2021    Procedure: Cataract removal with implant.;  Surgeon: Jamir Mac MD;  Location: [facility]     PHACOEMULSIFICATION WITH STANDARD INTRAOCULAR LENS IMPLANT Left 4/5/2021    Procedure: Cataract removal with implant.;  Surgeon: Jamir Mac MD;  Location: [facility]     tooth pulled 4/7  Right        REVIEW OF SYSTEMS:   ROS: 10 point ROS neg other than the symptoms noted above in the HPI and here:  Palliative Symptom Review (0=no symptom/no concern, 1=mild, 2=moderate, 3=severe):      Pain: 2      Fatigue: 1      Nausea: 0      Constipation: 0      Diarrhea: 0      Depressive Symptoms: 0      Anxiety: 0      Drowsiness: 0      Poor Appetite: 0      Shortness of Breath: 0      Insomnia: 0      Other: 0      Overall (0 good/no concerns, 3 very poor):  1    Exam via video:    Gen alert, comfortable appearing, NAD. Sitting at table at home, his wife Abida at his side  Head NCAT.  Eyes anicteric without injection  Face symmetric, eyes conjugate  Mouth pink, moist appearing  Lungs unlabored, no cough, speaking full sentences  Skin no rashes or lesions evident on face/neck  Neuro Face symmetric, eyes conjugate; speech fluent.  Neuropsych exam normal including affect, sensorium, gross memory, thought processes, and fund of knowledge.     Data Reviewed:  LABS: 4/29/21 normal CBC; 3/29/21 normal CMP except mild glucose elevation, albumin 3.3      IMAGING: CT scan CT and AbdPelvis late March 2021:  Pertinent findings:  \"1.  Large left upper quadrant mass/neoplasm appears to invade the left hemidiaphragm and most likely a metastatic lesion. This abuts, but does not appear to originate from the spleen. This would be amenable for percutaneous biopsy.  2.  Small left pleural effusion is likely reactive secondary to the mass.\"  and  \"1. Multiple masses in the mediastinum, left hilar region, anterior left pleural space, and " "left posterior subdiaphragmatic regions highly suspicious for metastatic disease. Recommend CT of the abdomen and pelvis to further evaluate for primary source. The left subdiaphragmatic lesion appears to be invading the posterior  diaphragm.\"    Impressions:  Palliative Performance Score:  90  Decision Making Capacity:  full    69 y/o man with new diagnosis of malignancy, most likely sarcomatoid mesothelioma.  Has pain in sternum and pain likely referred from his subdiaphragmatic mass.  Will start with scheduled acetaminophen/celebrex and prn opioid pain medication.  Given dysphoria with oxycodone and desire to start with low dose prn option, will do hydromorphone with early follow up to see if need to increase regimen.    Recommendations & Counselin.  Acetaminophen 1000 TID  +  Celebrex 100 BID  2.  Hydromorphone 1-2mg TID prn severe pain  3. Will ask palliative RN to reach out to patient in about 1 week to reassess pain/function  4.  Follow up with Sal Handy MD, in 4 weeks    Patient education: discussed risks and benefits, side effects of opioid pain medication and explained that we will work with him to find optimal management plan      Silvia Paniagua MD, *[phone]  Palliative Medicine Fellow  Patient staffed with Sal Handy MD, Staff, Palliative Medicine        Attending Note:  Patient seen and evaluated with Dr Paniagua and I agree with/confirm their findings/recs in this note.      Thank you for involving us in the patient's care.   Sal Handy MD / Palliative Medicine / Text me via [facility].          Again, thank you for allowing me to participate in the care of your patient.      Sincerely,    Silvia Paniagua MD      "

## 2021-06-15 NOTE — PROGRESS NOTES
Chief Complaint   Patient presents with     Ent Problem     chest biposy done on 5/26/21 and next day he lost his voice and it hasn't returned also noted last 3 days dry cough more frequently     History of Present Illness  Ifrah NORMA Huitron is a 70 year old male who presents today for evaluation.  I am seeing this patient in consultation for hoarseness, throat pain, malignant mesothelioma, spindle cell sarcoma at the request of the provider Dr. Kohler.  The patient reports a history of hoarseness following a CT-guided lung biopsy on 5/20/2021.  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  He felt like the voice issue happened about 24 hours after the biopsy.  He has had some throat discomfort with his voice use.  He denies any significant swallowing difficulty.  No hemoptysis, neck lumps/bumps/swelling, or unintentional weight loss. The patient denies any recent intubations or throat procedures.  The patient is a lifetime non-smoker.  He uses his voice frequently and the real [employer] business.    Past Medical History  Patient Active Problem List   Diagnosis     Calculus of kidney     Degeneration of lumbar or lumbosacral intervertebral disc     Eczema     Epidural lipomatosis     TUYET (generalized anxiety disorder)     Hypopituitarism (H)     Hypothyroidism     Osteoarthritis of spine with radiculopathy, lumbar region     Pituitary macroadenoma (H)     Rosacea     Chronic lower back pain     Spindle cell sarcoma (H)     Mesothelioma, malignant (H)     Vocal fold paralysis, left     Dysphonia     Muscle tension dysphonia     Mediastinal lymphadenopathy     Current Medications     Current Outpatient Medications:      acetaminophen (TYLENOL) 500 MG tablet, Take 2 tablets (1,000 mg) by mouth 3 times daily, Disp: 180 tablet, Rfl: 1     celecoxib (CELEBREX) 100 MG capsule, Take 1 capsule (100 mg) by mouth 2 times daily, Disp: 60 capsule, Rfl: 3     cholecalciferol (VITAMIN D-1000 MAX  ST) 1000 units TABS, Take 1,000 Units by mouth daily , Disp: , Rfl:      hydrocortisone (CORTEF) 10 MG tablet, Take 15 mg in the morning and 10 mg in the afternoon, Disp: , Rfl:      levothyroxine (SYNTHROID/LEVOTHROID) 75 MCG tablet, Take 75 mcg by mouth every morning , Disp: , Rfl:      minocycline (MINOCIN/DYNACIN) 50 MG capsule, Take 50 mg by mouth daily , Disp: , Rfl:      omeprazole (PRILOSEC) 20 MG DR capsule, Take 20 mg by mouth, Disp: , Rfl:      triamcinolone (KENALOG) 0.1 % external cream, Apply topically 2 times daily , Disp: , Rfl:     Allergies  Allergies   Allergen Reactions     Penicillins Other (See Comments) and Hives     swelling, hives         Social History   Social History     Socioeconomic History     Marital status:      Spouse name: Not on file     Number of children: Not on file     Years of education: Not on file     Highest education level: Not on file   Occupational History     Not on file   Social Needs     Financial resource strain: Not on file     Food insecurity     Worry: Not on file     Inability: Not on file     Transportation needs     Medical: Not on file     Non-medical: Not on file   Tobacco Use     Smoking status: Never Smoker     Smokeless tobacco: Never Used   Substance and Sexual Activity     Alcohol use: Yes     Comment: rare     Drug use: Never     Sexual activity: Not on file   Lifestyle     Physical activity     Days per week: Not on file     Minutes per session: Not on file     Stress: Not on file   Relationships     Social connections     Talks on phone: Not on file     Gets together: Not on file     Attends Orthodox service: Not on file     Active member of club or organization: Not on file     Attends meetings of clubs or organizations: Not on file     Relationship status: Not on file     Intimate partner violence     Fear of current or ex partner: Not on file     Emotionally abused: Not on file     Physically abused: Not on file     Forced sexual activity:  Not on file   Other Topics Concern     Parent/sibling w/ CABG, MI or angioplasty before 65F 55M? Not Asked   Social History Narrative     Not on file       Family History  Family History   Problem Relation Age of Onset     Lupus Mother      ALS Father      Rheumatoid Arthritis Sister        Review of Systems  As per HPI and PMHx, otherwise 10+ comprehensive system review is negative.    Physical Exam  BP (!) 158/96 (BP Location: Right arm, Patient Position: Chair, Cuff Size: Adult Regular)   Pulse 77   Temp 97.8  F (36.6  C) (Tympanic)   Wt 71.7 kg (158 lb)   BMI 25.12 kg/m    GENERAL: Patient is a pleasant, cooperative 70 year old male in no acute distress.  HEAD: Normocephalic, atraumatic.  Hair and scalp are normal.  EYES: Pupils are equal, round, reactive to light and accommodation.  Extraocular movements are intact.  The sclera nonicteric without injection.  The extraocular structures are normal.  EARS: Normal shape and symmetry.  No tenderness when palpating the mastoid or tragal areas bilaterally.  Otoscopic exam reveals a minimal amount of cerumen bilaterally.  The bilateral tympanic membranes are round, intact without evidence of effusion, good landmarks.  No retraction, granulation, or drainage.  NOSE: Nares are patent.  Nasal mucosa is pink and moist.  Nasal septum is midline.  Negative anterior rhinoscopy.  ORAL CAVITY: Dentition is in good repair.  Mucous membranes are moist.  Tongue is mobile, protrudes to the midline.  Palate elevates symmetrically.  No erythema or exudate.  No oral cavity or oropharyngeal masses, lesions, ulcerations, leukoplakia.  NECK: Supple, trachea is midline.  There no palpable cervical lymphadenopathy or masses bilaterally.  Palpation of the bilateral parotid and submandibular areas reveal no masses.  No thyromegaly.    NEUROLOGIC: Cranial nerves II through XII are grossly intact.  The patients voice is hoarse and raspy.  Patient does show some signs of right hemifacial  spasm.  He also has some weakness in the facial nerve on the right-hand side.  The patient is House-Brackmann III/VI on the right for some brow weakness, mid facial weakness, and marginal division weakness.  Patient is House-Brackmann I/VI.  CARDIOVASCULAR: Extremities are warm and well-perfused.  No significant peripheral edema.  RESPIRATORY: Patient has nonlabored breathing without cough, wheeze, stridor.  PSYCHIATRIC: Patient is alert and oriented.  Mood and affect appear normal.  SKIN: Warm and dry.  No scalp, face, or neck lesions noted.    Procedure: Flexible Laryngoscopy  Indication: Dysphonia    To best visualize the upper airway anatomy and due to the chief complaint and HPI, I proceeded with flexible fiberoptic laryngoscopy examination.  The bilateral nasal cavities were anesthetized and decongested with a mixture of lidocaine and neosynephrine.  The bilateral nasal cavities were examined using a flexible fiberoptic laryngoscope.  There were no nasal cavity masses, polyps, or mucopurulence bilaterally.  The nasal septum is midline.  The nasopharynx had a normal appearance with normal Eustachian tube openings and fossa of Rosenmuller bilaterally.  Minimal adenoid tissue.  The base of tongue, vallecula, epiglottis, aryepiglottic folds, arytenoids, and piriform sinuses were without mass or lesion.  There is a moderate amount of interarytenoid thickening and a mild amount of erythema.  The right true vocal fold has excellent abduction adduction, the left true vocal fold is in a fixed, paramedian position with some slight atrophy.  The bilateral true vocal folds were without nodules or masses.  There was significant supraglottic phonation/squeeze.  The visualized portions of the infraglottic and subglottic airway are unremarkable.  The scope was removed.  The patient tolerated the procedure well.          Assessment and Plan    ICD-10-CM    1. Vocal fold paralysis, left  J38.01 LARYNGOSCOPY FLEX FIBEROPTIC,  DIAGNOSTIC     Case Request: MICROLARYNGOSCOPY, LEFT TRUE VOCAL CORD INJECTION WITH PROLARYN     SPEECH THERAPY REFERRAL   2. Dysphonia  R49.0 LARYNGOSCOPY FLEX FIBEROPTIC, DIAGNOSTIC     Case Request: MICROLARYNGOSCOPY, LEFT TRUE VOCAL CORD INJECTION WITH PROLARYN     SPEECH THERAPY REFERRAL   3. Muscle tension dysphonia  R49.0 LARYNGOSCOPY FLEX FIBEROPTIC, DIAGNOSTIC     Case Request: MICROLARYNGOSCOPY, LEFT TRUE VOCAL CORD INJECTION WITH PROLARYN     SPEECH THERAPY REFERRAL   4. Mesothelioma, malignant (H)  C45.9 LARYNGOSCOPY FLEX FIBEROPTIC, DIAGNOSTIC     Case Request: MICROLARYNGOSCOPY, LEFT TRUE VOCAL CORD INJECTION WITH PROLARYN   5. Spindle cell sarcoma (H)  C49.9 LARYNGOSCOPY FLEX FIBEROPTIC, DIAGNOSTIC     Case Request: MICROLARYNGOSCOPY, LEFT TRUE VOCAL CORD INJECTION WITH PROLARYN   6. Mediastinal lymphadenopathy  R59.0 LARYNGOSCOPY FLEX FIBEROPTIC, DIAGNOSTIC     Case Request: MICROLARYNGOSCOPY, LEFT TRUE VOCAL CORD INJECTION WITH PROLARYN      It was my pleasure seeing Ifrah Huitron today in clinic.  The patient presented today with dysphonia likely secondary to left true vocal fold motion impairment from his mediastinal mass.  We discussed treatment to include both vocal fold augmentation and speech therapy to help with voice use given his frequent voice use.      We discussed the risks, benefits, alternatives, options of microdirect laryngoscopy, left true vocal fold injection with ProLaryn including, but not, limited to: risk of bleeding, risk of infection, risk of post-operative pain, risk of injury to the teeth, tongue, lips, gums, risk of scarring, risk of airway fire, potential need for additional procedures/injections, risk of general anesthesia.  We discussed the postoperative course and convalescence including the potential need for voice rest and potential need for speech therapy.  The patient voiced understanding and is willing to proceed.    I also placed a referral for speech therapy.   Yenni not doing anything permanent to the voicebox for at least a year after identified paresis/paralysis.  I would like to see him back 6 to 8 weeks after the injection.      [place] to follow up with Primary Care provider regarding elevated blood pressure.    Kyree Bearden MD  Department of Otolarygology-Head and Neck Surgery  [facility]

## 2021-06-17 LAB
FUNGUS SPEC CULT: NORMAL
SPECIMEN SOURCE: NORMAL

## 2021-06-18 DIAGNOSIS — C49.9 SPINDLE CELL SARCOMA (H): ICD-10-CM

## 2021-06-18 DIAGNOSIS — C45.9 MESOTHELIOMA, MALIGNANT (H): Primary | ICD-10-CM

## 2021-06-18 DIAGNOSIS — C49.9 SPINDLE CELL SARCOMA (H): Primary | ICD-10-CM

## 2021-06-18 RX ORDER — DIPHENHYDRAMINE HYDROCHLORIDE 50 MG/ML
50 INJECTION INTRAMUSCULAR; INTRAVENOUS
Status: CANCELLED
Start: 2021-06-21

## 2021-06-18 RX ORDER — EPINEPHRINE 1 MG/ML
0.3 INJECTION, SOLUTION INTRAMUSCULAR; SUBCUTANEOUS EVERY 5 MIN PRN
Status: CANCELLED | OUTPATIENT
Start: 2021-06-21

## 2021-06-18 RX ORDER — HEPARIN SODIUM (PORCINE) LOCK FLUSH IV SOLN 100 UNIT/ML 100 UNIT/ML
5 SOLUTION INTRAVENOUS
Status: CANCELLED | OUTPATIENT
Start: 2021-06-21

## 2021-06-18 RX ORDER — LORAZEPAM 2 MG/ML
0.5 INJECTION INTRAMUSCULAR EVERY 4 HOURS PRN
Status: CANCELLED
Start: 2021-06-21

## 2021-06-18 RX ORDER — MEPERIDINE HYDROCHLORIDE 25 MG/ML
25 INJECTION INTRAMUSCULAR; INTRAVENOUS; SUBCUTANEOUS EVERY 30 MIN PRN
Status: CANCELLED | OUTPATIENT
Start: 2021-06-21

## 2021-06-18 RX ORDER — NALOXONE HYDROCHLORIDE 0.4 MG/ML
0.2 INJECTION, SOLUTION INTRAMUSCULAR; INTRAVENOUS; SUBCUTANEOUS
Status: CANCELLED | OUTPATIENT
Start: 2021-06-21

## 2021-06-18 RX ORDER — METHYLPREDNISOLONE SODIUM SUCCINATE 125 MG/2ML
125 INJECTION, POWDER, LYOPHILIZED, FOR SOLUTION INTRAMUSCULAR; INTRAVENOUS
Status: CANCELLED
Start: 2021-06-21

## 2021-06-18 RX ORDER — ALBUTEROL SULFATE 90 UG/1
1-2 AEROSOL, METERED RESPIRATORY (INHALATION)
Status: CANCELLED
Start: 2021-06-21

## 2021-06-18 RX ORDER — ALBUTEROL SULFATE 5 MG/ML
2.5 SOLUTION RESPIRATORY (INHALATION)
Status: CANCELLED | OUTPATIENT
Start: 2021-06-21

## 2021-06-18 RX ORDER — HEPARIN SODIUM,PORCINE 10 UNIT/ML
5 VIAL (ML) INTRAVENOUS
Status: CANCELLED | OUTPATIENT
Start: 2021-06-21

## 2021-06-21 ENCOUNTER — HOSPITAL ENCOUNTER (OUTPATIENT)
Dept: CT IMAGING | Facility: CLINIC | Age: 71
Discharge: HOME | End: 2021-06-21
Attending: INTERNAL MEDICINE | Admitting: INTERNAL MEDICINE
Payer: MEDICARE

## 2021-06-21 ENCOUNTER — OFFICE VISIT (OUTPATIENT)
Dept: OTOLARYNGOLOGY | Facility: CLINIC | Age: 71
End: 2021-06-21
Attending: FAMILY MEDICINE
Payer: MEDICARE

## 2021-06-21 ENCOUNTER — APPOINTMENT (OUTPATIENT)
Dept: LAB | Facility: CLINIC | Age: 71
End: 2021-06-21
Attending: INTERNAL MEDICINE
Payer: MEDICARE

## 2021-06-21 ENCOUNTER — INFUSION THERAPY VISIT (OUTPATIENT)
Dept: ONCOLOGY | Facility: CLINIC | Age: 71
End: 2021-06-21
Attending: INTERNAL MEDICINE
Payer: MEDICARE

## 2021-06-21 VITALS
DIASTOLIC BLOOD PRESSURE: 96 MMHG | HEART RATE: 77 BPM | WEIGHT: 158 LBS | BODY MASS INDEX: 25.12 KG/M2 | TEMPERATURE: 97.8 F | SYSTOLIC BLOOD PRESSURE: 158 MMHG

## 2021-06-21 VITALS
TEMPERATURE: 96.8 F | RESPIRATION RATE: 16 BRPM | BODY MASS INDEX: 26.19 KG/M2 | HEART RATE: 72 BPM | OXYGEN SATURATION: 98 % | SYSTOLIC BLOOD PRESSURE: 159 MMHG | DIASTOLIC BLOOD PRESSURE: 62 MMHG | WEIGHT: 164.7 LBS

## 2021-06-21 DIAGNOSIS — C49.9 SPINDLE CELL SARCOMA (H): ICD-10-CM

## 2021-06-21 DIAGNOSIS — J38.01 VOCAL FOLD PARALYSIS, LEFT: Primary | ICD-10-CM

## 2021-06-21 DIAGNOSIS — R49.0 MUSCLE TENSION DYSPHONIA: ICD-10-CM

## 2021-06-21 DIAGNOSIS — R49.0 DYSPHONIA: ICD-10-CM

## 2021-06-21 DIAGNOSIS — C45.9 MESOTHELIOMA, MALIGNANT (H): Primary | ICD-10-CM

## 2021-06-21 DIAGNOSIS — R59.0 MEDIASTINAL LYMPHADENOPATHY: ICD-10-CM

## 2021-06-21 DIAGNOSIS — C45.9 MESOTHELIOMA, MALIGNANT (H): ICD-10-CM

## 2021-06-21 LAB
ALBUMIN SERPL-MCNC: 3.5 G/DL (ref 3.4–5)
ALP SERPL-CCNC: 149 U/L (ref 40–150)
ALT SERPL W P-5'-P-CCNC: 26 U/L (ref 0–70)
ALT SERPL W P-5'-P-CCNC: 35 U/L (ref 0–70)
ANION GAP SERPL CALCULATED.3IONS-SCNC: 6 MMOL/L (ref 3–14)
AST SERPL W P-5'-P-CCNC: 23 U/L (ref 0–45)
AST SERPL W P-5'-P-CCNC: 25 U/L (ref 0–45)
BASOPHILS # BLD AUTO: 0.1 10E9/L (ref 0–0.2)
BASOPHILS NFR BLD AUTO: 0.5 %
BILIRUB SERPL-MCNC: 0.3 MG/DL (ref 0.2–1.3)
BILIRUB SERPL-MCNC: 0.7 MG/DL (ref 0.2–1.3)
BUN SERPL-MCNC: 12 MG/DL (ref 7–30)
CALCIUM SERPL-MCNC: 8.9 MG/DL (ref 8.5–10.1)
CHLORIDE SERPL-SCNC: 106 MMOL/L (ref 94–109)
CO2 SERPL-SCNC: 27 MMOL/L (ref 20–32)
COPATH REPORT: NORMAL
CREAT BLD-MCNC: 0.7 MG/DL (ref 0.66–1.25)
CREAT SERPL-MCNC: 0.76 MG/DL (ref 0.66–1.25)
DIFFERENTIAL METHOD BLD: NORMAL
EOSINOPHIL # BLD AUTO: 0.1 10E9/L (ref 0–0.7)
EOSINOPHIL NFR BLD AUTO: 1 %
ERYTHROCYTE [DISTWIDTH] IN BLOOD BY AUTOMATED COUNT: 13.9 % (ref 10–15)
GFR SERPL CREATININE-BSD FRML MDRD: >90 ML/MIN/{1.73_M2}
GFR SERPL CREATININE-BSD FRML MDRD: >90 ML/MIN/{1.73_M2}
GLUCOSE SERPL-MCNC: 133 MG/DL (ref 70–99)
HCT VFR BLD AUTO: 44.7 % (ref 40–53)
HGB BLD-MCNC: 14.4 G/DL (ref 13.3–17.7)
IMM GRANULOCYTES # BLD: 0.1 10E9/L (ref 0–0.4)
IMM GRANULOCYTES NFR BLD: 0.7 %
LYMPHOCYTES # BLD AUTO: 2 10E9/L (ref 0.8–5.3)
LYMPHOCYTES NFR BLD AUTO: 18.3 %
MCH RBC QN AUTO: 28 PG (ref 26.5–33)
MCHC RBC AUTO-ENTMCNC: 32.2 G/DL (ref 31.5–36.5)
MCV RBC AUTO: 87 FL (ref 78–100)
MONOCYTES # BLD AUTO: 0.7 10E9/L (ref 0–1.3)
MONOCYTES NFR BLD AUTO: 6.4 %
NEUTROPHILS # BLD AUTO: 8 10E9/L (ref 1.6–8.3)
NEUTROPHILS NFR BLD AUTO: 73.1 %
NRBC # BLD AUTO: 0 10*3/UL
NRBC BLD AUTO-RTO: 0 /100
PLATELET # BLD AUTO: 273 10E9/L (ref 150–450)
POTASSIUM SERPL-SCNC: 3.8 MMOL/L (ref 3.4–5.3)
PROT SERPL-MCNC: 7.2 G/DL (ref 6.8–8.8)
RBC # BLD AUTO: 5.15 10E12/L (ref 4.4–5.9)
SODIUM SERPL-SCNC: 139 MMOL/L (ref 133–144)
TSH SERPL DL<=0.005 MIU/L-ACNC: 0.58 MU/L (ref 0.4–4)
WBC # BLD AUTO: 11 10E9/L (ref 4–11)

## 2021-06-21 PROCEDURE — 71250 CT THORAX DX C-: CPT | Mod: MG

## 2021-06-21 PROCEDURE — 258N000003 HC RX IP 258 OP 636: Performed by: INTERNAL MEDICINE

## 2021-06-21 PROCEDURE — 31575 DIAGNOSTIC LARYNGOSCOPY: CPT | Performed by: OTOLARYNGOLOGY

## 2021-06-21 PROCEDURE — 250N000011 HC RX IP 250 OP 636: Performed by: INTERNAL MEDICINE

## 2021-06-21 PROCEDURE — 85025 COMPLETE CBC W/AUTO DIFF WBC: CPT | Performed by: INTERNAL MEDICINE

## 2021-06-21 PROCEDURE — 99203 OFFICE O/P NEW LOW 30 MIN: CPT | Mod: 25 | Performed by: OTOLARYNGOLOGY

## 2021-06-21 PROCEDURE — 96413 CHEMO IV INFUSION 1 HR: CPT

## 2021-06-21 PROCEDURE — 84443 ASSAY THYROID STIM HORMONE: CPT | Performed by: INTERNAL MEDICINE

## 2021-06-21 PROCEDURE — 80053 COMPREHEN METABOLIC PANEL: CPT | Performed by: INTERNAL MEDICINE

## 2021-06-21 RX ADMIN — SODIUM CHLORIDE 250 ML: 9 INJECTION, SOLUTION INTRAVENOUS at 16:00

## 2021-06-21 RX ADMIN — SODIUM CHLORIDE 200 MG: 9 INJECTION, SOLUTION INTRAVENOUS at 16:00

## 2021-06-21 ASSESSMENT — PAIN SCALES - GENERAL: PAINLEVEL: MILD PAIN (3)

## 2021-06-21 NOTE — PROGRESS NOTES
Infusion Nursing Note:  Ifrah Huitron presents today for C1D1 Keytruda.    Patient seen by provider today: No   present during visit today: Not Applicable.    Patient is new to the infusion room today and is receiving Keytruda for the first time.  Patient oriented to infusion room, location of bathrooms and nutrition stations, and call light.  Verified that patient received written information previously; Luiz Castaneda  Care Coordinator previously provided teaching to patient. Verbally reviewed Keytruda teaching, side effects, take-home medications, and follow-up schedule with patient.     Pt denies fever, chills, SOB, or cough.  No new concerns or changes in patient's health.  Pt has baseline hoarseness in voice, dry cough, and numbness/tingling in feet.    Reviewed today's plan of care with patient.    TORB: Dr. Wolff/Jena Anguiano RN 6/21/21@3:07pm  -OK for patient to receive treatment today without seeing provider prior to infusion; will see GERALDO Alvarado tomorrow     MARLEEN appointment was cancelled during patient's infusion; IB message sent to Dr. Wolff and RN[facility] for new appointment needs.    Patient instructed to call triage with any questions or if he experiences a temperature >100.5, shaking chills, uncontrolled nausea/vomiting/diarrhea, dizziness, shortness of breath, bleeding not relieved with pressure, or with any other concerns.     Intravenous Access:  Peripheral IV placed.    Treatment Conditions:  Lab Results   Component Value Date    HGB 14.4 06/21/2021     Lab Results   Component Value Date    WBC 11.0 06/21/2021      Lab Results   Component Value Date    ANEU 8.0 06/21/2021     Lab Results   Component Value Date     06/21/2021      Lab Results   Component Value Date     06/21/2021                   Lab Results   Component Value Date    POTASSIUM 3.8 06/21/2021           No results found for: MAG         Lab Results   Component Value Date    CR 0.76 06/21/2021                    Lab Results   Component Value Date    COTY 8.9 06/21/2021                Lab Results   Component Value Date    BILITOTAL 0.7 06/21/2021           Lab Results   Component Value Date    ALBUMIN 3.5 06/21/2021                    Lab Results   Component Value Date    ALT 26 06/21/2021           Lab Results   Component Value Date    AST 25 06/21/2021       Results reviewed, labs MET treatment parameters, ok to proceed with treatment.      Post Infusion Assessment:  Patient tolerated infusion without incident.  Blood return noted pre and post infusion.  Site patent and intact, free from redness, edema or discomfort.  No evidence of extravasations.       Discharge Plan:   Patient declined prescription refills.  Discharge instructions reviewed with: Patient.  Patient and/or family verbalized understanding of discharge instructions and all questions answered.  Copy of AVS reviewed with patient and/or family.  Patient will return 7/12/21 for next appointment. IB message sent to RNCC and scheduling about scheduling lab appointment for 7/12/21.  Patient discharged in stable condition accompanied by: self.  Departure Mode: Ambulatory.    Jena Anguiano RN

## 2021-06-21 NOTE — NURSING NOTE
Chief Complaint   Patient presents with     Blood Draw     labs drawn with piv start by rn.  vs taken     Labs drawn with PIV start by rn.  Pt tolerated well.  VS taken and pt checked in for next appt.  BPs elevated today in lab; pt states he will discuss with PCP next week.    La Rader RN

## 2021-06-21 NOTE — LETTER
6/21/2021         RE: Ifrah Huitron  [id number] [place] [address]        Dear Colleague,    Thank you for referring your patient, Ifrah Huitron, to the [facility]. Please see a copy of my visit note below.    Chief Complaint   Patient presents with     Ent Problem     chest biposy done on 5/26/21 and next day he lost his voice and it hasn't returned also noted last 3 days dry cough more frequently     History of Present Illness  Ifrah Huitron is a 70 year old male who presents today for evaluation.  I am seeing this patient in consultation for hoarseness, throat pain, malignant mesothelioma, spindle cell sarcoma at the request of the provider Dr. Kohler.  The patient reports a history of hoarseness following a CT-guided lung biopsy on 5/20/2021.  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  He felt like the voice issue happened about 24 hours after the biopsy.  He has had some throat discomfort with his voice use.  He denies any significant swallowing difficulty.  No hemoptysis, neck lumps/bumps/swelling, or unintentional weight loss. The patient denies any recent intubations or throat procedures.  The patient is a lifetime non-smoker.  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    Past Medical History  Patient Active Problem List   Diagnosis     Calculus of kidney     Degeneration of lumbar or lumbosacral intervertebral disc     Eczema     Epidural lipomatosis     TUYET (generalized anxiety disorder)     Hypopituitarism (H)     Hypothyroidism     Osteoarthritis of spine with radiculopathy, lumbar region     Pituitary macroadenoma (H)     Rosacea     Chronic lower back pain     Spindle cell sarcoma (H)     Mesothelioma, malignant (H)     Vocal fold paralysis, left     Dysphonia     Muscle tension dysphonia     Mediastinal lymphadenopathy     Current Medications     Current Outpatient Medications:      acetaminophen  (TYLENOL) 500 MG tablet, Take 2 tablets (1,000 mg) by mouth 3 times daily, Disp: 180 tablet, Rfl: 1     celecoxib (CELEBREX) 100 MG capsule, Take 1 capsule (100 mg) by mouth 2 times daily, Disp: 60 capsule, Rfl: 3     cholecalciferol (VITAMIN D-1000 MAX ST) 1000 units TABS, Take 1,000 Units by mouth daily , Disp: , Rfl:      hydrocortisone (CORTEF) 10 MG tablet, Take 15 mg in the morning and 10 mg in the afternoon, Disp: , Rfl:      levothyroxine (SYNTHROID/LEVOTHROID) 75 MCG tablet, Take 75 mcg by mouth every morning , Disp: , Rfl:      minocycline (MINOCIN/DYNACIN) 50 MG capsule, Take 50 mg by mouth daily , Disp: , Rfl:      omeprazole (PRILOSEC) 20 MG DR capsule, Take 20 mg by mouth, Disp: , Rfl:      triamcinolone (KENALOG) 0.1 % external cream, Apply topically 2 times daily , Disp: , Rfl:     Allergies  Allergies   Allergen Reactions     Penicillins Other (See Comments) and Hives     swelling, hives         Social History   Social History     Socioeconomic History     Marital status:      Spouse name: Not on file     Number of children: Not on file     Years of education: Not on file     Highest education level: Not on file   Occupational History     Not on file   Social Needs     Financial resource strain: Not on file     Food insecurity     Worry: Not on file     Inability: Not on file     Transportation needs     Medical: Not on file     Non-medical: Not on file   Tobacco Use     Smoking status: Never Smoker     Smokeless tobacco: Never Used   Substance and Sexual Activity     Alcohol use: Yes     Comment: rare     Drug use: Never     Sexual activity: Not on file   Lifestyle     Physical activity     Days per week: Not on file     Minutes per session: Not on file     Stress: Not on file   Relationships     Social connections     Talks on phone: Not on file     Gets together: Not on file     Attends [employer] service: Not on file     Active member of club or organization: Not on file     Attends  meetings of clubs or organizations: Not on file     Relationship status: Not on file     Intimate partner violence     Fear of current or ex partner: Not on file     Emotionally abused: Not on file     Physically abused: Not on file     Forced sexual activity: Not on file   Other Topics Concern     Parent/sibling w/ CABG, MI or angioplasty before 65F 55M? Not Asked   Social History Narrative     Not on file       Family History  Family History   Problem Relation Age of Onset     Lupus Mother      ALS Father      Rheumatoid Arthritis Sister        Review of Systems  As per HPI and PMHx, otherwise 10+ comprehensive system review is negative.    Physical Exam  BP (!) 158/96 (BP Location: Right arm, Patient Position: Chair, Cuff Size: Adult Regular)   Pulse 77   Temp 97.8  F (36.6  C) (Tympanic)   Wt 71.7 kg (158 lb)   BMI 25.12 kg/m    GENERAL: Patient is a pleasant, cooperative 70 year old male in no acute distress.  HEAD: Normocephalic, atraumatic.  Hair and scalp are normal.  EYES: Pupils are equal, round, reactive to light and accommodation.  Extraocular movements are intact.  The sclera nonicteric without injection.  The extraocular structures are normal.  EARS: Normal shape and symmetry.  No tenderness when palpating the mastoid or tragal areas bilaterally.  Otoscopic exam reveals a minimal amount of cerumen bilaterally.  The bilateral tympanic membranes are round, intact without evidence of effusion, good landmarks.  No retraction, granulation, or drainage.  NOSE: Nares are patent.  Nasal mucosa is pink and moist.  Nasal septum is midline.  Negative anterior rhinoscopy.  ORAL CAVITY: Dentition is in good repair.  Mucous membranes are moist.  Tongue is mobile, protrudes to the midline.  Palate elevates symmetrically.  No erythema or exudate.  No oral cavity or oropharyngeal masses, lesions, ulcerations, leukoplakia.  NECK: Supple, trachea is midline.  There no palpable cervical lymphadenopathy or masses  bilaterally.  Palpation of the bilateral parotid and submandibular areas reveal no masses.  No thyromegaly.    NEUROLOGIC: Cranial nerves II through XII are grossly intact.  The patients voice is hoarse and raspy.  Patient does show some signs of right hemifacial spasm.  He also has some weakness in the facial nerve on the right-hand side.  The patient is House-Brackmann III/VI on the right for some brow weakness, mid facial weakness, and marginal division weakness.  Patient is House-Brackmann I/VI.  CARDIOVASCULAR: Extremities are warm and well-perfused.  No significant peripheral edema.  RESPIRATORY: Patient has nonlabored breathing without cough, wheeze, stridor.  PSYCHIATRIC: Patient is alert and oriented.  Mood and affect appear normal.  SKIN: Warm and dry.  No scalp, face, or neck lesions noted.    Procedure: Flexible Laryngoscopy  Indication: Dysphonia    To best visualize the upper airway anatomy and due to the chief complaint and HPI, I proceeded with flexible fiberoptic laryngoscopy examination.  The bilateral nasal cavities were anesthetized and decongested with a mixture of lidocaine and neosynephrine.  The bilateral nasal cavities were examined using a flexible fiberoptic laryngoscope.  There were no nasal cavity masses, polyps, or mucopurulence bilaterally.  The nasal septum is midline.  The nasopharynx had a normal appearance with normal Eustachian tube openings and fossa of Rosenmuller bilaterally.  Minimal adenoid tissue.  The base of tongue, vallecula, epiglottis, aryepiglottic folds, arytenoids, and piriform sinuses were without mass or lesion.  There is a moderate amount of interarytenoid thickening and a mild amount of erythema.  The right true vocal fold has excellent abduction adduction, the left true vocal fold is in a fixed, paramedian position with some slight atrophy.  The bilateral true vocal folds were without nodules or masses.  There was significant supraglottic phonation/squeeze.  The  visualized portions of the infraglottic and subglottic airway are unremarkable.  The scope was removed.  The patient tolerated the procedure well.          Assessment and Plan    ICD-10-CM    1. Vocal fold paralysis, left  J38.01 LARYNGOSCOPY FLEX FIBEROPTIC, DIAGNOSTIC     Case Request: MICROLARYNGOSCOPY, LEFT TRUE VOCAL CORD INJECTION WITH PROLARYN     SPEECH THERAPY REFERRAL   2. Dysphonia  R49.0 LARYNGOSCOPY FLEX FIBEROPTIC, DIAGNOSTIC     Case Request: MICROLARYNGOSCOPY, LEFT TRUE VOCAL CORD INJECTION WITH PROLARYN     SPEECH THERAPY REFERRAL   3. Muscle tension dysphonia  R49.0 LARYNGOSCOPY FLEX FIBEROPTIC, DIAGNOSTIC     Case Request: MICROLARYNGOSCOPY, LEFT TRUE VOCAL CORD INJECTION WITH PROLARYN     SPEECH THERAPY REFERRAL   4. Mesothelioma, malignant (H)  C45.9 LARYNGOSCOPY FLEX FIBEROPTIC, DIAGNOSTIC     Case Request: MICROLARYNGOSCOPY, LEFT TRUE VOCAL CORD INJECTION WITH PROLARYN   5. Spindle cell sarcoma (H)  C49.9 LARYNGOSCOPY FLEX FIBEROPTIC, DIAGNOSTIC     Case Request: MICROLARYNGOSCOPY, LEFT TRUE VOCAL CORD INJECTION WITH PROLARYN   6. Mediastinal lymphadenopathy  R59.0 LARYNGOSCOPY FLEX FIBEROPTIC, DIAGNOSTIC     Case Request: MICROLARYNGOSCOPY, LEFT TRUE VOCAL CORD INJECTION WITH PROLARYN      It was my pleasure seeing Ifrah Huitron today in clinic.  The patient presented today with dysphonia likely secondary to left true vocal fold motion impairment from his mediastinal mass.  We discussed treatment to include both vocal fold augmentation and speech therapy to help with voice use given his frequent voice use.      We discussed the risks, benefits, alternatives, options of microdirect laryngoscopy, left true vocal fold injection with ProLaryn including, but not, limited to: risk of bleeding, risk of infection, risk of post-operative pain, risk of injury to the teeth, tongue, lips, gums, risk of scarring, risk of airway fire, potential need for additional procedures/injections, risk of general  anesthesia.  We discussed the postoperative course and convalescence including the potential need for voice rest and potential need for speech therapy.  The patient voiced understanding and is willing to proceed.    I also placed a referral for speech therapy.  Yenni not doing anything permanent to the voicebox for at least a year after identified paresis/paralysis.  I would like to see him back 6 to 8 weeks after the injection.      Ifrah to follow up with Primary Care provider regarding elevated blood pressure.    Kyree Bearden MD  Department of Otolarygology-Head and Neck Surgery  [facility]         Again, thank you for allowing me to participate in the care of your patient.        Sincerely,        Kyree Bearden MD

## 2021-06-21 NOTE — PATIENT INSTRUCTIONS
[facility] Triage and after hours / weekends / holidays:  [phone]    Please call the triage or after hours line if you experience a temperature greater than or equal to 100.5, shaking chills, have uncontrolled nausea, vomiting and/or diarrhea, dizziness, shortness of breath, chest pain, bleeding, unexplained bruising, or if you have any other new/concerning symptoms, questions or concerns.      If you are having any concerning symptoms or wish to speak to a provider before your next infusion visit, please call your care coordinator or triage to notify them so we can adequately serve you.     If you need a refill on a narcotic prescription or other medication, please call before your infusion appointment.                 June 2021 Sunday Monday Tuesday Wednesday Thursday Friday Saturday 1    VIDEO VISIT NEW   2:45 PM   (60 min.)   Luke Wolff MD   [facility] 2     3     4     5       6     7     8    SHORT   9:20 AM   (20 min.)   Sukhdev Kohler MD   [facility] 9     10    VIDEO VISIT [facility]   2:30 PM   (75 min.)   Silvia Paniagua MD   [facility] 11     12       13     14     15     16     17     18     19       20     21    CT CHEST WO  10:45 AM   (20 min.)   [id number]   [facility]    NEW  11:20 AM   (20 min.)   Kyree Bearden MD   [facility]    LAB PERIPHERAL   2:00 PM   (15 min.)    [facility] DRAW   [facility]    ONC INFUSION 1 HR (60 MIN)   2:30 PM   (60 min.)    ONC INFUSION NURSE   [facility] 22     23    PRE-OP   2:40 PM   (20 min.)   Sukhdev Kohler MD   [facility] 24     25     26       27     28    COVID SEROLOGY TEST LAB VISIT   4:15 PM   (15 min.)   CL COVID LAB   [facility] 29  [birth year]!     30                                 July 2021 Sunday Monday Tuesday Wednesday Thursday Friday Saturday                       1     2     3       4     5     6     7     8    VIDEO VISIT RETURN   2:05 PM   (40 min.)   Sal Handy MD   [facility] 9     10       11     12    VIDEO VISIT RETURN  11:00 AM   (45 min.)   Maynor Rios PA   [facility]    ONC INFUSION 1 HR (60 MIN)   3:30 PM   (60 min.)    ONC INFUSION NURSE   [facility] 13     14     15     16     17       18     19     20     21     22     23     24       25     26     27     28     29     30     31                    Recent Results (from the past 24 hour(s))   Comprehensive metabolic panel    Collection Time: 06/21/21  2:10 PM   Result Value Ref Range    Sodium 139 133 - 144 mmol/L    Potassium 3.8 3.4 - 5.3 mmol/L    Chloride 106 94 - 109 mmol/L    Carbon Dioxide 27 20 - 32 mmol/L    Anion Gap 6 3 - 14 mmol/L    Glucose 133 (H) 70 - 99 mg/dL    Urea Nitrogen 12 7 - 30 mg/dL    Creatinine 0.76 0.66 - 1.25 mg/dL    GFR Estimate >90 >60 mL/min/[1.73_m2]    GFR Estimate If Black >90 >60 mL/min/[1.73_m2]    Calcium 8.9 8.5 - 10.1 mg/dL    Bilirubin Total 0.3 0.2 - 1.3 mg/dL    Albumin 3.5 3.4 - 5.0 g/dL    Protein Total 7.2 6.8 - 8.8 g/dL    Alkaline Phosphatase 149 40 - 150 U/L    ALT 35 0 - 70 U/L    AST 23 0 - 45 U/L   TSH with free T4 reflex    Collection Time: 06/21/21  2:10 PM   Result Value Ref Range    TSH 0.58 0.40 - 4.00 mU/L   *CBC with platelets differential    Collection Time: 06/21/21  2:10 PM   Result Value Ref Range    WBC 11.0 4.0 - 11.0 10e9/L    RBC Count 5.15 4.4 - 5.9 10e12/L    Hemoglobin 14.4 13.3 - 17.7 g/dL    Hematocrit 44.7 40.0 - 53.0 %    MCV 87 78 - 100 fl    MCH 28.0 26.5 - 33.0 pg    MCHC 32.2 31.5 - 36.5 g/dL    RDW 13.9 10.0 - 15.0 %    Platelet Count 273 150 - 450 10e9/L    Diff Method Automated Method     % Neutrophils 73.1 %     % Lymphocytes 18.3 %    % Monocytes 6.4 %    % Eosinophils 1.0 %    % Basophils 0.5 %    % Immature Granulocytes 0.7 %    Nucleated RBCs 0 0 /100    Absolute Neutrophil 8.0 1.6 - 8.3 10e9/L    Absolute Lymphocytes 2.0 0.8 - 5.3 10e9/L    Absolute Monocytes 0.7 0.0 - 1.3 10e9/L    Absolute Eosinophils 0.1 0.0 - 0.7 10e9/L    Absolute Basophils 0.1 0.0 - 0.2 10e9/L    Abs Immature Granulocytes 0.1 0 - 0.4 10e9/L    Absolute Nucleated RBC 0.0    ALT    Collection Time: 06/21/21  2:55 PM   Result Value Ref Range    ALT 26 0 - 70 U/L   AST    Collection Time: 06/21/21  2:55 PM   Result Value Ref Range    AST 25 0 - 45 U/L   Bilirubin  total    Collection Time: 06/21/21  2:55 PM   Result Value Ref Range    Bilirubin Total 0.7 0.2 - 1.3 mg/dL   Creatinine POCT    Collection Time: 06/21/21  3:06 PM   Result Value Ref Range    Creatinine 0.7 0.66 - 1.25 mg/dL    GFR Estimate >90 >60 mL/min/[1.73_m2]    GFR Estimate If Black >90 >60 mL/min/[1.73_m2]

## 2021-06-22 DIAGNOSIS — Z11.59 ENCOUNTER FOR SCREENING FOR OTHER VIRAL DISEASES: ICD-10-CM

## 2021-06-23 ENCOUNTER — PATIENT OUTREACH (OUTPATIENT)
Dept: PALLIATIVE CARE | Facility: CLINIC | Age: 71
End: 2021-06-23

## 2021-06-23 ENCOUNTER — OFFICE VISIT (OUTPATIENT)
Dept: FAMILY MEDICINE | Facility: CLINIC | Age: 71
End: 2021-06-23
Payer: MEDICARE

## 2021-06-23 VITALS
RESPIRATION RATE: 20 BRPM | BODY MASS INDEX: 25.39 KG/M2 | HEIGHT: 67 IN | OXYGEN SATURATION: 97 % | WEIGHT: 161.8 LBS | HEART RATE: 93 BPM | SYSTOLIC BLOOD PRESSURE: 120 MMHG | TEMPERATURE: 97.4 F | DIASTOLIC BLOOD PRESSURE: 80 MMHG

## 2021-06-23 DIAGNOSIS — C49.9 SPINDLE CELL SARCOMA (H): ICD-10-CM

## 2021-06-23 DIAGNOSIS — E03.4 HYPOTHYROIDISM DUE TO ACQUIRED ATROPHY OF THYROID: ICD-10-CM

## 2021-06-23 DIAGNOSIS — C45.9 MESOTHELIOMA, MALIGNANT (H): ICD-10-CM

## 2021-06-23 DIAGNOSIS — G89.3 CANCER ASSOCIATED PAIN: ICD-10-CM

## 2021-06-23 DIAGNOSIS — R49.0 DYSPHONIA: ICD-10-CM

## 2021-06-23 DIAGNOSIS — Z01.818 PREOP GENERAL PHYSICAL EXAM: Primary | ICD-10-CM

## 2021-06-23 DIAGNOSIS — J38.01 VOCAL FOLD PARALYSIS, LEFT: ICD-10-CM

## 2021-06-23 PROCEDURE — 99214 OFFICE O/P EST MOD 30 MIN: CPT | Performed by: FAMILY MEDICINE

## 2021-06-23 RX ORDER — HYDROMORPHONE HYDROCHLORIDE 2 MG/1
1-2 TABLET ORAL 3 TIMES DAILY PRN
Qty: 14 TABLET | Refills: 0 | Status: ON HOLD | COMMUNITY
Start: 2021-06-23 | End: 2021-10-26

## 2021-06-23 ASSESSMENT — MIFFLIN-ST. JEOR: SCORE: 1434.61

## 2021-06-23 NOTE — H&P (VIEW-ONLY)
[facility]  [address] [address]  Phone: [phone]  Primary Provider: Sukhdev Kohler  Pre-op Performing Provider: SUKHDEV KOHLER      PREOPERATIVE EVALUATION:  Today's date: 6/23/2021    Ifrah Huitron is a 70 year old male who presents for a preoperative evaluation.    Surgical Information:  Surgery/Procedure: microlaryngoscopy, left true vocal cord injection with prolaryn  Surgery Location: [facility]  Surgeon: Dr. Bearden  Surgery Date: 7/1/21  Time of Surgery: 8:10  Where patient plans to recover: At home with family  Fax number for surgical facility: Note does not need to be faxed, will be available electronically in Epic.    Type of Anesthesia Anticipated: General    Assessment & Plan     The proposed surgical procedure is considered LOW risk.    Preop general physical exam    Vocal fold paralysis, left    Dysphonia    Hypothyroidism due to acquired atrophy of thyroid  Stable.    Mesothelioma, malignant (H)  Managed by oncology. Not a contraindication to planned procedure.,    Spindle cell sarcoma (H)  See above      Possible Sleep Apnea: patient was advised after his procedure and if health has been stable, pursue sleep clinic eval.   No flowsheet data found.         Risks and Recommendations:  The patient has the following additional risks and recommendations for perioperative complications:  Obstructive Sleep Apnea:   Monitor O2 sats while sedated or under anesthesia. Refer to respiratory therapy and hospitalist if with desats.    Medication Instructions:  Hold all oral meds on morning of surgery.    RECOMMENDATION:  APPROVAL GIVEN to proceed with proposed procedure, without further diagnostic evaluation.  Subjective     HPI related to upcoming procedure: Patient has chronic hoarseness, found to have vocal cord paralysis. Hence, planned surgery. Reviewed above with patient.    Preop Questions 6/23/2021   1. Have you  "ever had a heart attack or stroke? No   2. Have you ever had surgery on your heart or blood vessels, such as a stent placement, a coronary artery bypass, or surgery on an artery in your head, neck, heart, or legs? No   3. Do you have chest pain with activity? No   4. Do you have a history of  heart failure? No   5. Do you currently have a cold, bronchitis or symptoms of other infection? No   6. Do you have a cough, shortness of breath, or wheezing? YES - from recent diagnosis of the above, as well as medicastinal cancer   7. Do you or anyone in your family have previous history of blood clots? No   8. Do you or does anyone in your family have a serious bleeding problem such as prolonged bleeding following surgeries or cuts? No   9. Have you ever had problems with anemia or been told to take iron pills? No   10. Have you had any abnormal blood loss such as black, tarry or bloody stools? No   11. Have you ever had a blood transfusion? No   12. Are you willing to have a blood transfusion if it is medically needed before, during, or after your surgery? Yes   13. Have you or any of your relatives ever had problems with anesthesia? YES - patient said as a child had an adverse reaction to an anesthetic that \"is not used these days\".   14. Do you have sleep apnea, excessive snoring or daytime drowsiness? YES - chronic snoring (patient said wife tells him this). No previous sleep clinic consult.   14a. Do you have a CPAP machine? No   15. Do you have any artifical heart valves or other implanted medical devices like a pacemaker, defibrillator, or continuous glucose monitor? No   16. Do you have artificial joints? No   17. Are you allergic to latex? No       Health Care Directive:  Patient does not have a Health Care Directive or Living Will: Patient states has Advance Directive and will bring in a copy to clinic.    Preoperative Review of :   reviewed - controlled substances reflected in medication list.     Patient " "states he was given a supposedly pain med \"by the palliative care people\" that he has not taken yet.    Status of Chronic Conditions:  See problem list for active medical problems.  Problems all longstanding and stable, except as noted/documented.  See ROS for pertinent symptoms related to these conditions.    HYPOTHYROIDISM - Patient has a longstanding history of chronic Hypothyroidism. Patient has been doing well, noting no tremor, insomnia, hair loss or changes in skin texture. Continues to take medications as directed, without adverse reactions or side effects. Last TSH   Lab Results   Component Value Date    TSH 0.58 06/21/2021   .        Review of Systems  CONSTITUTIONAL: NEGATIVE for fever, chills, change in weight  INTEGUMENTARY/SKIN: NEGATIVE for worrisome rashes, moles or lesions  EYES: NEGATIVE for vision changes or irritation  ENT/MOUTH: see above  RESP: NEGATIVE for significant cough or SOB  CV: NEGATIVE for chest pain, palpitations or peripheral edema  GI: NEGATIVE for nausea, abdominal pain, heartburn, or change in bowel habits  : NEGATIVE for frequency, dysuria, or hematuria  MUSCULOSKELETAL: NEGATIVE for significant arthralgias or myalgia  NEURO: NEGATIVE for weakness, dizziness or paresthesias  ENDOCRINE: NEGATIVE for temperature intolerance, skin/hair changes  HEME: NEGATIVE for bleeding problems  PSYCHIATRIC: NEGATIVE for changes in mood or affect    Patient Active Problem List    Diagnosis Date Noted     Vocal fold paralysis, left 06/21/2021     Priority: Medium     Added automatically from request for surgery [id number]       Dysphonia 06/21/2021     Priority: Medium     Added automatically from request for surgery [id number]       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     Priority: Medium     Added automatically from request for surgery [id number]       Mediastinal lymphadenopathy 06/21/2021     Priority: Medium     Added automatically from request for surgery [id number]       Mesothelioma, malignant (H) " 06/04/2021     Priority: Medium     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     Priority: Medium     TUYET (generalized anxiety disorder) 05/23/2017     Priority: Medium     Degeneration of lumbar or lumbosacral intervertebral disc 01/04/2016     Priority: Medium     Osteoarthritis of spine with radiculopathy, lumbar region 01/04/2016     Priority: Medium     Epidural lipomatosis 12/03/2015     Priority: Medium     Chronic lower back pain 12/03/2015     Priority: Medium     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 Thyroid started 4/10  Low cortisol [1] 5/7/10 so secondary adrenal insufficiency  hypophysectomy 8/23/10       Eczema 01/12/2009     Priority: Medium     Calculus of kidney 09/09/2004     Priority: Medium     Rosacea 09/09/2004     Priority: Medium      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     Thyroid disease     removed pituitary gland     Past Surgical History:   Procedure Laterality Date     COLONOSCOPY N/A 12/17/2020    Procedure: COLONOSCOPY;  Surgeon: Ken Camacho MD;  Location: [facility]     ENT SURGERY       HERNIA REPAIR       PHACOEMULSIFICATION WITH STANDARD INTRAOCULAR LENS IMPLANT Right 3/10/2021    Procedure: Cataract removal with implant.;  Surgeon: Jamir Mac MD;  Location: [facility]     PHACOEMULSIFICATION WITH STANDARD INTRAOCULAR LENS IMPLANT Left 4/5/2021    Procedure: Cataract removal with implant.;  Surgeon: Jamir Mac MD;  Location: [facility]     tooth pulled 4/7  Right      Current Outpatient Medications   Medication Sig Dispense Refill     acetaminophen (TYLENOL) 500 MG tablet Take 2 tablets (1,000 mg) by mouth 3 times daily 180 tablet 1      "celecoxib (CELEBREX) 100 MG capsule Take 1 capsule (100 mg) by mouth 2 times daily 60 capsule 3     cholecalciferol (VITAMIN D-1000 MAX ST) 1000 units TABS Take 1,000 Units by mouth daily        hydrocortisone (CORTEF) 10 MG tablet Take 15 mg in the morning and 10 mg in the afternoon       levothyroxine (SYNTHROID/LEVOTHROID) 75 MCG tablet Take 75 mcg by mouth every morning        minocycline (MINOCIN/DYNACIN) 50 MG capsule Take 50 mg by mouth daily        omeprazole (PRILOSEC) 20 MG DR capsule Take 20 mg by mouth       triamcinolone (KENALOG) 0.1 % external cream Apply topically 2 times daily          Allergies   Allergen Reactions     Penicillins Other (See Comments) and Hives     swelling, hives          Social History     Tobacco Use     Smoking status: Never Smoker     Smokeless tobacco: Never Used   Substance Use Topics     Alcohol use: Yes     Comment: rare     Family History   Problem Relation Age of Onset     Lupus Mother      ALS Father      Rheumatoid Arthritis Sister      History   Drug Use Unknown         Objective     /80   Pulse 93   Temp 97.4  F (36.3  C) (Tympanic)   Resp 20   Ht 1.689 m (5' 6.5\")   Wt 73.4 kg (161 lb 12.8 oz)   SpO2 97%   BMI 25.72 kg/m      Physical Exam  GENERAL APPEARANCE: well-nourished,  alert and no distress; ambulatory w/o assist  EYES: pink conj, no icterus, PERRL, EOMI  HENT: ear canals and TM's normal, nose and mouth without ulcers or lesions, oropharynx clear and oral mucous membranes moist  NECK: no adenopathy, no asymmetry, masses, or scars and thyroid normal to palpation  RESP: lungs clear to auscultation - no rales, rhonchi or wheezes  CV: regular rates and rhythm, normal S1 S2, no S3 or S4, no murmur, click or rub, no peripheral edema and peripheral pulses strong  ABDOMEN: soft, nontender, no hepatosplenomegaly, no masses and bowel sounds normal  MS: no musculoskeletal defects are noted and gait is age appropriate without ataxia  SKIN: good turgor, no " rash/jaundice/ecchymosis  NEURO: Normal strength and tone, sensory exam grossly normal, mentation intact and speech normal    Recent Labs   Lab Test 06/21/21  1410 04/29/21  1216 03/29/21  1022   HGB 14.4 15.1 15.0    260 247   INR  --  1.13  --      --  137   POTASSIUM 3.8  --  3.4   CR 0.76  --  0.83        Diagnostics:  No labs were ordered during this visit.   No EKG required for low risk surgery (cataract, skin procedure, breast biopsy, etc).  No EKG required, no history of coronary heart disease, significant arrhythmia, peripheral arterial disease or other structural heart disease.    Revised Cardiac Risk Index (RCRI):  The patient has the following serious cardiovascular risks for perioperative complications:   - No serious cardiac risks = 0 points     RCRI Interpretation: 0 points: Class I (very low risk - 0.4% complication rate)           Signed Electronically by: Sukhdev Kohler MD  Copy of this evaluation report is provided to requesting physician.

## 2021-06-23 NOTE — PROGRESS NOTES
"Received VM from patient asking for call back to discuss pain - states he has some issues and some questions he would like to discuss.     Called him back - He reports that the pain medicine he is on doesn't seem to help much. States his biggest issue is overnight as the pain is waking him up so he doesn't sleep well.     He reports he is taking the tylenol TID and celebrex BID, has not taken hydromophone as he thought that was \"just a last resort.\"    States he can handle the pain during the day fine, usually its always a 1 or a 2 if he is moving around.    Night time is the worst for him though. Usually wakes up within an hour of going to bed. Bengay does help take the edge off and he can sometimes fall back to sleep.     It is not new pain that is bothering him, it's the same pain - sometimes in left shoulder area, spine area, and low back -- lately left shoulder area has been most bothersome.     Distraction helps some too - but notes he doesn't sleep well then. Usually gets up, moves around, watches TV, uses Bengay and then finally can get back to sleep. Thinks he has been sleeping only 4-5 hours intermittently for the past 3-4 days.    Advised okay to try 1/2 tab hydromorphone (1mg) at bedtime tonight to see if that helps. We talked about hydromorphone side effects a length. He will try and let us know.     Hydromorphone added back historically to patient's med list.     He does have follow up scheduled in a few weeks with Dr. Handy.   "

## 2021-06-23 NOTE — PROGRESS NOTES
[facility]  [address] [address]  Phone: [phone]  Primary Provider: Sukhdev Kohler  Pre-op Performing Provider: SUKHDEV KOHLER      PREOPERATIVE EVALUATION:  Today's date: 6/23/2021    Ifrah Huitron is a 70 year old male who presents for a preoperative evaluation.    Surgical Information:  Surgery/Procedure: microlaryngoscopy, left true vocal cord injection with prolaryn  Surgery Location: [facility]  Surgeon: Dr. Bearden  Surgery Date: 7/1/21  Time of Surgery: 8:10  Where patient plans to recover: At home with family  Fax number for surgical facility: Note does not need to be faxed, will be available electronically in Epic.    Type of Anesthesia Anticipated: General    Assessment & Plan     The proposed surgical procedure is considered LOW risk.    Preop general physical exam    Vocal fold paralysis, left    Dysphonia    Hypothyroidism due to acquired atrophy of thyroid  Stable.    Mesothelioma, malignant (H)  Managed by oncology. Not a contraindication to planned procedure.,    Spindle cell sarcoma (H)  See above      Possible Sleep Apnea: patient was advised after his procedure and if health has been stable, pursue sleep clinic eval.   No flowsheet data found.         Risks and Recommendations:  The patient has the following additional risks and recommendations for perioperative complications:  Obstructive Sleep Apnea:   Monitor O2 sats while sedated or under anesthesia. Refer to respiratory therapy and hospitalist if with desats.    Medication Instructions:  Hold all oral meds on morning of surgery.    RECOMMENDATION:  APPROVAL GIVEN to proceed with proposed procedure, without further diagnostic evaluation.  Subjective     HPI related to upcoming procedure: Patient has chronic hoarseness, found to have vocal cord paralysis. Hence, planned surgery. Reviewed above with patient.    Preop Questions 6/23/2021   1. Have you  "ever had a heart attack or stroke? No   2. Have you ever had surgery on your heart or blood vessels, such as a stent placement, a coronary artery bypass, or surgery on an artery in your head, neck, heart, or legs? No   3. Do you have chest pain with activity? No   4. Do you have a history of  heart failure? No   5. Do you currently have a cold, bronchitis or symptoms of other infection? No   6. Do you have a cough, shortness of breath, or wheezing? YES - from recent diagnosis of the above, as well as medicastinal cancer   7. Do you or anyone in your family have previous history of blood clots? No   8. Do you or does anyone in your family have a serious bleeding problem such as prolonged bleeding following surgeries or cuts? No   9. Have you ever had problems with anemia or been told to take iron pills? No   10. Have you had any abnormal blood loss such as black, tarry or bloody stools? No   11. Have you ever had a blood transfusion? No   12. Are you willing to have a blood transfusion if it is medically needed before, during, or after your surgery? Yes   13. Have you or any of your relatives ever had problems with anesthesia? YES - patient said as a child had an adverse reaction to an anesthetic that \"is not used these days\".   14. Do you have sleep apnea, excessive snoring or daytime drowsiness? YES - chronic snoring (patient said wife tells him this). No previous sleep clinic consult.   14a. Do you have a CPAP machine? No   15. Do you have any artifical heart valves or other implanted medical devices like a pacemaker, defibrillator, or continuous glucose monitor? No   16. Do you have artificial joints? No   17. Are you allergic to latex? No       Health Care Directive:  Patient does not have a Health Care Directive or Living Will: Patient states has Advance Directive and will bring in a copy to clinic.    Preoperative Review of :   reviewed - controlled substances reflected in medication list.     Patient " "states he was given a supposedly pain med \"by the palliative care people\" that he has not taken yet.    Status of Chronic Conditions:  See problem list for active medical problems.  Problems all longstanding and stable, except as noted/documented.  See ROS for pertinent symptoms related to these conditions.    HYPOTHYROIDISM - Patient has a longstanding history of chronic Hypothyroidism. Patient has been doing well, noting no tremor, insomnia, hair loss or changes in skin texture. Continues to take medications as directed, without adverse reactions or side effects. Last TSH   Lab Results   Component Value Date    TSH 0.58 06/21/2021   .        Review of Systems  CONSTITUTIONAL: NEGATIVE for fever, chills, change in weight  INTEGUMENTARY/SKIN: NEGATIVE for worrisome rashes, moles or lesions  EYES: NEGATIVE for vision changes or irritation  ENT/MOUTH: see above  RESP: NEGATIVE for significant cough or SOB  CV: NEGATIVE for chest pain, palpitations or peripheral edema  GI: NEGATIVE for nausea, abdominal pain, heartburn, or change in bowel habits  : NEGATIVE for frequency, dysuria, or hematuria  MUSCULOSKELETAL: NEGATIVE for significant arthralgias or myalgia  NEURO: NEGATIVE for weakness, dizziness or paresthesias  ENDOCRINE: NEGATIVE for temperature intolerance, skin/hair changes  HEME: NEGATIVE for bleeding problems  PSYCHIATRIC: NEGATIVE for changes in mood or affect    Patient Active Problem List    Diagnosis Date Noted     Vocal fold paralysis, left 06/21/2021     Priority: Medium     Added automatically from request for surgery [id number]       Dysphonia 06/21/2021     Priority: Medium     Added automatically from request for surgery [id number]       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     Priority: Medium     Added automatically from request for surgery [id number]       Mediastinal lymphadenopathy 06/21/2021     Priority: Medium     Added automatically from request for surgery [id number]       Mesothelioma, malignant (H) " 06/04/2021     Priority: Medium     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     Priority: Medium     TUYET (generalized anxiety disorder) 05/23/2017     Priority: Medium     Degeneration of lumbar or lumbosacral intervertebral disc 01/04/2016     Priority: Medium     Osteoarthritis of spine with radiculopathy, lumbar region 01/04/2016     Priority: Medium     Epidural lipomatosis 12/03/2015     Priority: Medium     Chronic lower back pain 12/03/2015     Priority: Medium     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 Thyroid started 4/10  Low cortisol [1] 5/7/10 so secondary adrenal insufficiency  hypophysectomy 8/23/10       Eczema 01/12/2009     Priority: Medium     Calculus of kidney 09/09/2004     Priority: Medium     Rosacea 09/09/2004     Priority: Medium      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     Thyroid disease     removed pituitary gland     Past Surgical History:   Procedure Laterality Date     COLONOSCOPY N/A 12/17/2020    Procedure: COLONOSCOPY;  Surgeon: Ken Camacho MD;  Location: [facility]     ENT SURGERY       HERNIA REPAIR       PHACOEMULSIFICATION WITH STANDARD INTRAOCULAR LENS IMPLANT Right 3/10/2021    Procedure: Cataract removal with implant.;  Surgeon: Jamir Mac MD;  Location: [facility]     PHACOEMULSIFICATION WITH STANDARD INTRAOCULAR LENS IMPLANT Left 4/5/2021    Procedure: Cataract removal with implant.;  Surgeon: Jamir Mac MD;  Location: [facility]     tooth pulled 4/7  Right      Current Outpatient Medications   Medication Sig Dispense Refill     acetaminophen (TYLENOL) 500 MG tablet Take 2 tablets (1,000 mg) by mouth 3 times daily 180 tablet 1      "celecoxib (CELEBREX) 100 MG capsule Take 1 capsule (100 mg) by mouth 2 times daily 60 capsule 3     cholecalciferol (VITAMIN D-1000 MAX ST) 1000 units TABS Take 1,000 Units by mouth daily        hydrocortisone (CORTEF) 10 MG tablet Take 15 mg in the morning and 10 mg in the afternoon       levothyroxine (SYNTHROID/LEVOTHROID) 75 MCG tablet Take 75 mcg by mouth every morning        minocycline (MINOCIN/DYNACIN) 50 MG capsule Take 50 mg by mouth daily        omeprazole (PRILOSEC) 20 MG DR capsule Take 20 mg by mouth       triamcinolone (KENALOG) 0.1 % external cream Apply topically 2 times daily          Allergies   Allergen Reactions     Penicillins Other (See Comments) and Hives     swelling, hives          Social History     Tobacco Use     Smoking status: Never Smoker     Smokeless tobacco: Never Used   Substance Use Topics     Alcohol use: Yes     Comment: rare     Family History   Problem Relation Age of Onset     Lupus Mother      ALS Father      Rheumatoid Arthritis Sister      History   Drug Use Unknown         Objective     /80   Pulse 93   Temp 97.4  F (36.3  C) (Tympanic)   Resp 20   Ht 1.689 m (5' 6.5\")   Wt 73.4 kg (161 lb 12.8 oz)   SpO2 97%   BMI 25.72 kg/m      Physical Exam  GENERAL APPEARANCE: well-nourished,  alert and no distress; ambulatory w/o assist  EYES: pink conj, no icterus, PERRL, EOMI  HENT: ear canals and TM's normal, nose and mouth without ulcers or lesions, oropharynx clear and oral mucous membranes moist  NECK: no adenopathy, no asymmetry, masses, or scars and thyroid normal to palpation  RESP: lungs clear to auscultation - no rales, rhonchi or wheezes  CV: regular rates and rhythm, normal S1 S2, no S3 or S4, no murmur, click or rub, no peripheral edema and peripheral pulses strong  ABDOMEN: soft, nontender, no hepatosplenomegaly, no masses and bowel sounds normal  MS: no musculoskeletal defects are noted and gait is age appropriate without ataxia  SKIN: good turgor, no " rash/jaundice/ecchymosis  NEURO: Normal strength and tone, sensory exam grossly normal, mentation intact and speech normal    Recent Labs   Lab Test 06/21/21  1410 04/29/21  1216 03/29/21  1022   HGB 14.4 15.1 15.0    260 247   INR  --  1.13  --      --  137   POTASSIUM 3.8  --  3.4   CR 0.76  --  0.83        Diagnostics:  No labs were ordered during this visit.   No EKG required for low risk surgery (cataract, skin procedure, breast biopsy, etc).  No EKG required, no history of coronary heart disease, significant arrhythmia, peripheral arterial disease or other structural heart disease.    Revised Cardiac Risk Index (RCRI):  The patient has the following serious cardiovascular risks for perioperative complications:   - No serious cardiac risks = 0 points     RCRI Interpretation: 0 points: Class I (very low risk - 0.4% complication rate)           Signed Electronically by: Sukhdev Kohler MD  Copy of this evaluation report is provided to requesting physician.

## 2021-06-23 NOTE — PATIENT INSTRUCTIONS
How to Take Your Medication Before Surgery  - HOLD (do not take) all morning medications scheduled on day of surgery; resume when able to swallow after.    Do not take Ibuprofen, Aspirin or Naproxen from now until after procedure.  If you need to take something for pain, take Acetaminophen 325 mg orally 1-2 tabs every 4-6 hrs as needed for pain     Preparing for Your Surgery  Getting started  A nurse will call you to review your health history and instructions. They will give you an arrival time based on your scheduled surgery time.  Please be ready to share the following:    Your doctor's clinic name and phone number    Your medical, surgical and anesthesia history    A list of allergies and sensitivities    A list of medicines, including herbal treatments and over-the-counter drugs    Whether the patient has a legal guardian (ask how to send us the papers in advance)  If you have a child who's having surgery, please ask for a copy of Preparing for Your Child's Surgery.    Preparing for surgery    Within 30 days of surgery: Have a pre-op exam (sometimes called an H&P, or History and Physical). This can be done at a clinic or pre-operative center.  ? If you're having a , you may not need this exam. Talk to your care team    At your pre-op exam, talk to your care team about all medicines you take. If you need to stop any medicines before surgery, ask when to start taking them again.  ? We do this for your safety. Many medicines can make you bleed too much during surgery. Some change how well surgery (anesthesia) drugs work.    Call your insurance company to let them know you're having surgery. (If you don't have insurance, call [phone].)    Call your clinic if there's any change in your health. This includes signs of a cold or flu (sore throat, runny nose, cough, rash, fever). It also includes a scrape or scratch near the surgery site.    If you have questions on the day of surgery, call your hospital  or surgery center.  Eating and drinking guidelines  For your safety: Unless your surgeon tells you otherwise, follow the guidelines below.    Eat and drink as usual until 8 hours before surgery. After that, no food or milk.    Drink clear liquids until 2 hours before surgery. These are liquids you can see through, like water, Gatorade and Propel Water. You may also have black coffee and tea (no cream or milk).    Nothing by mouth within 2 hours of surgery. This includes gum, candy and breath mints.    If you drink, stop drinking alcohol the night before surgery.    If your care team tells you to take medicine on the morning of surgery, it's okay to take it with a sip of water.  Preventing infection    Shower or bathe the night before and morning of your surgery. Follow the instructions your clinic gave you. (If no instructions, use regular soap.)    Don't shave or clip hair near your surgery site. We'll remove the hair if needed.    Don't smoke or vape the morning of surgery. You may chew nicotine gum up to 2 hours before surgery. A nicotine patch is okay.  ? Note: Some surgeries require you to completely quit smoking and nicotine. Check with your surgeon.    Your care team will make every effort to keep you safe from infection. We will:  ? Clean our hands often with soap and water (or an alcohol-based hand rub).  ? Clean the skin at your surgery site with a special soap that kills germs.  ? Give you a special gown to keep you warm. (Cold raises the risk of infection.)  ? Wear special hair covers, masks, gowns and gloves during surgery.  ? Give antibiotic medicine, if prescribed. Not all surgeries need antibiotics.  What to bring on the day of surgery    Photo ID and insurance card    Copy of your health care directive, if you have one    Glasses and hearing aides (bring cases)  ? You can't wear contacts during surgery    Inhaler and eye drops, if you use them (tell us about these when you arrive)    CPAP machine or  breathing device, if you use them    A few personal items, if spending the night    If you have . . .  ? A pacemaker or ICD (cardiac defibrillator): Bring the ID card.  ? An implanted stimulator: Bring the remote control.  ? A legal guardian: Bring a copy of the certified (court-stamped) guardianship papers.  Please remove any jewelry, including body piercings. Leave jewelry and other valuables at home.  If you're going home the day of surgery  Important: If you don't follow the rules below, we must cancel your surgery.     Arrange for someone to drive you home after surgery. You may not drive, take a taxi or take public transportation by yourself (unless you'll have local anesthesia only).    Arrange for a responsible adult to stay with you overnight. If you don't, we may keep you in the hospital overnight, and you may need to pay the costs yourself.  Questions?   If you have any questions for your care team, list them here: _________________________________________________________________________________________________________________________________________________________________________________________________________________________________________________________________________________________________________________________  For informational purposes only. Not to replace the advice of your health care provider. Copyright   2003, 2019 [facility]. All rights reserved. Clinically reviewed by Megha Christensen MD. mInfo [id number] - REV 4/20.    Before Your Procedure or Hospital Admission  Testing for COVID-19 (Coronavirus)  Thank you for choosing [facility] for your health care needs. This is a very challenging time for everyone. The World Health Organization and the State of [place] have declared the COVID-19 virus a pandemic.   Our goal is to keep you and our team here at [facility] safe and healthy. We've taken several steps to make this happen. For example:    We screen our  "staff, care teams and patients for COVID-19.    Everyone at [facility] must wear a mask and stay 6 feet apart.    We are limiting hospital and clinic visitors.  Before you come in  All patients must get tested for COVID-19. Your test needs to happen 2 to 4 days before you check in to the hospital or surgery site.   A clinic scheduler will call about a week in advance to set up a testing time at one of our labs where we'll take a swab of your nose or throat.  Note: If you go to a clinic or pharmacy like [facility] or [facility] for your test, make sure it's a \"RT-PCR\" test, not a \"rapid\" COVID-19 test. (See Questions and Answers below.)  After the test, please stay at home and stay out of contact with other people. It will be harder for you to recover if you get COVID-19 before your treatment.  Please follow all current safety guidelines, including:    Limit trips outside your home.    Limit the number of people you see.    Always wear a mask outside your home.    Use social distancing. (Stay 6 feet away from others whenever you can.)    Wash your hands often.  If your test shows you have COVID-19  If your test is positive, we'll let you know. A positive test means that you have the virus.   We'll probably have to postpone your admission, surgery or procedure. Your doctor will discuss this with you. After that, we'll let you know what to do and when you can reschedule.   We may need to cancel your treatment on short notice for other reasons, too.  If your test shows you DON'T have COVID-19  Even if your test is negative, you may still get COVID-19. It's rare but, sometimes, the test result is wrong. You could also catch the virus after taking the test.   There's a very small chance that you could catch COVID-19 in the hospital or surgery center. [facility] has taken many steps to prevent this from happening.   Day of your surgery or procedure    Please come wearing a mask or something else that covers both your " "nose and mouth.    When you arrive, we'll ask you some questions to find out if you have any signs or symptoms of COVID-19.    Ask your care team if you can have visitors. All visitors must wear masks and will be screened for signs of COVID-19.  ? Even if no visitors are allowed, you can still have with you:    Your legal guardian or legal decision maker    A parent and one other visitor, if you are younger than 18 years old    A partner and a , if you are in labor  ? We might need to teach you about taking care of yourself after surgery. If so, a visitor can come into the hospital to learn about it, too.  ? The rules for visitors change often, depending on how much the virus is spreading. To learn more, see Visiting a Loved One in the Hospital during the COVID-19 Outbreak.  Please call your care team, hospital or surgery center if you have any questions. We thank you for your understanding and for choosing [facility] for your care.   Questions and Answers  Does it matter where I get tested for COVID-19?  Yes. We urge you to get tested at one of our [facility] COVID-19 testing sites. We process these tests in our lab and can get the results quickly. Your [facility] care team needs to get your results before you check in.  What should I do if I can't get tested at [facility]?  You can get tested somewhere else, but you'll need to take these extra steps:  1. Contact your family doctor or clinic to arrange your test.  2. Take the test within 4 days of your surgery or procedure. We can't accept tests older than 4 days.  3. Make sure your doctor or clinic faxes your results to [facility] at [phone].  If we don't get your results in time, we may have to postpone or cancel your treatment.  Ask if you're getting a \"RT-PCR\" COVID-19 test. It should NOT be a \"rapid\" COVID-19 test. Many drug stores use \"rapid\" tests, but they may not be as accurate. We don't accept the results of " "\"rapid\" tests.  For informational purposes only. Not to replace the advice of your health care provider. Copyright   2020 [facility]. All rights reserved. Clinically reviewed by Infection Prevention and the [facility] COVID-19 Clinical Team. [employer] [id number] - REV 10/20.      "

## 2021-06-28 ENCOUNTER — MYC MEDICAL ADVICE (OUTPATIENT)
Dept: OTOLARYNGOLOGY | Facility: CLINIC | Age: 71
End: 2021-06-28

## 2021-06-28 DIAGNOSIS — R05.9 COUGH: ICD-10-CM

## 2021-06-28 DIAGNOSIS — J38.01 VOCAL FOLD PARALYSIS, LEFT: Primary | ICD-10-CM

## 2021-06-28 DIAGNOSIS — R49.0 MUSCLE TENSION DYSPHONIA: ICD-10-CM

## 2021-06-28 DIAGNOSIS — R49.0 DYSPHONIA: ICD-10-CM

## 2021-06-28 DIAGNOSIS — Z11.59 ENCOUNTER FOR SCREENING FOR OTHER VIRAL DISEASES: ICD-10-CM

## 2021-06-28 DIAGNOSIS — C45.9 MESOTHELIOMA, MALIGNANT (H): ICD-10-CM

## 2021-06-28 LAB
SARS-COV-2 RNA RESP QL NAA+PROBE: NORMAL
SPECIMEN SOURCE: NORMAL

## 2021-06-28 PROCEDURE — U0003 INFECTIOUS AGENT DETECTION BY NUCLEIC ACID (DNA OR RNA); SEVERE ACUTE RESPIRATORY SYNDROME CORONAVIRUS 2 (SARS-COV-2) (CORONAVIRUS DISEASE [COVID-19]), AMPLIFIED PROBE TECHNIQUE, MAKING USE OF HIGH THROUGHPUT TECHNOLOGIES AS DESCRIBED BY CMS-2020-01-R: HCPCS | Performed by: OTOLARYNGOLOGY

## 2021-06-28 PROCEDURE — U0005 INFEC AGEN DETEC AMPLI PROBE: HCPCS | Performed by: OTOLARYNGOLOGY

## 2021-06-30 ENCOUNTER — ANESTHESIA EVENT (OUTPATIENT)
Dept: SURGERY | Facility: CLINIC | Age: 71
End: 2021-06-30
Payer: MEDICARE

## 2021-06-30 RX ORDER — ACETAMINOPHEN 325 MG/1
975 TABLET ORAL ONCE
Status: CANCELLED | OUTPATIENT
Start: 2021-06-30 | End: 2021-06-30

## 2021-06-30 NOTE — ANESTHESIA PREPROCEDURE EVALUATION
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]  Mediastinal lymphadenopathy [R59.0]   Procedure : Procedure(s):  MICROLARYNGOSCOPY, LEFT TRUE VOCAL CORD INJECTION WITH PROLARYN     Past Medical History:   Diagnosis Date     Arthritis      Mesothelioma, malignant (H) 2021     Spindle cell sarcoma (H) 2021     Thyroid disease     removed pituitary gland      Past Surgical History:   Procedure Laterality Date     COLONOSCOPY N/A 2020    Procedure: COLONOSCOPY;  Surgeon: Ken Camahco MD;  Location: [facility]     ENT SURGERY       HERNIA REPAIR       PHACOEMULSIFICATION WITH STANDARD INTRAOCULAR LENS IMPLANT Right 3/10/2021    Procedure: Cataract removal with implant.;  Surgeon: Jamir Mac MD;  Location: [facility]     PHACOEMULSIFICATION WITH STANDARD INTRAOCULAR LENS IMPLANT Left 2021    Procedure: Cataract removal with implant.;  Surgeon: Jamir Mac MD;  Location: [facility]     tooth pulled   Right       Allergies   Allergen Reactions     Penicillins Other (See Comments) and Hives     swelling, hives        Social History     Tobacco Use     Smoking status: Never Smoker     Smokeless tobacco: Never Used   Substance Use Topics     Alcohol use: Yes     Comment: rare      Wt Readings from Last 1 Encounters:   21 73.4 kg (161 lb 12.8 oz)        Anesthesia Evaluation   Pt has had prior anesthetic.     No history of anesthetic complications       ROS/MED HX  ENT/Pulmonary:  - neg pulmonary ROS     Neurologic:  - neg neurologic ROS     Cardiovascular:  - neg cardiovascular ROS     METS/Exercise Tolerance: >4 METS    Hematologic:  - neg hematologic  ROS     Musculoskeletal:   (+) arthritis,     GI/Hepatic:  - neg GI/hepatic ROS      Renal/Genitourinary:     (+) Nephrolithiasis ,     Endo: Comment: hypopituitarism    (+) thyroid problem,     Psychiatric/Substance Use:     (+) psychiatric history anxiety     Infectious Disease:  - neg infectious disease ROS     Malignancy:   (+) Malignancy,     Other:  - neg other ROS          Physical Exam    Airway        Mallampati: III   TM distance: > 3 FB   Neck ROM: full   Mouth opening: < 3 cm    Respiratory Devices and Support         Dental  no notable dental history         Cardiovascular   cardiovascular exam normal          Pulmonary   pulmonary exam normal                OUTSIDE LABS:  CBC:   Lab Results   Component Value Date    WBC 11.0 06/21/2021    WBC 10.5 04/29/2021    HGB 14.4 06/21/2021    HGB 15.1 04/29/2021    HCT 44.7 06/21/2021    HCT 46.3 04/29/2021     06/21/2021     04/29/2021     BMP:   Lab Results   Component Value Date     06/21/2021     03/29/2021    POTASSIUM 3.8 06/21/2021    POTASSIUM 3.4 03/29/2021    CHLORIDE 106 06/21/2021    CHLORIDE 104 03/29/2021    CO2 27 06/21/2021    CO2 29 03/29/2021    BUN 12 06/21/2021    BUN 14 03/29/2021    CR 0.76 06/21/2021    CR 0.83 03/29/2021     (H) 06/21/2021     (H) 04/29/2021     COAGS:   Lab Results   Component Value Date    INR 1.13 04/29/2021     POC:   Lab Results   Component Value Date    BGM 85 05/23/2019     HEPATIC:   Lab Results   Component Value Date    ALBUMIN 3.5 06/21/2021    PROTTOTAL 7.2 06/21/2021    ALT 26 06/21/2021    AST 25 06/21/2021    ALKPHOS 149 06/21/2021    BILITOTAL 0.7 06/21/2021     OTHER:   Lab Results   Component Value Date    LACT 1.4 05/23/2019    OCTY 8.9 06/21/2021    LIPASE 79 03/29/2021    TSH 0.58 06/21/2021       Anesthesia Plan    ASA Status:  3   NPO Status:  NPO Appropriate    Anesthesia Type: General.   Induction: Propofol, Intravenous.   Maintenance: Balanced.        Consents    Anesthesia Plan(s) and associated risks, benefits, and realistic alternatives  discussed. Questions answered and patient/representative(s) expressed understanding.     - Discussed with:  Patient         Postoperative Care    Pain management: IV analgesics, Oral pain medications, Multi-modal analgesia.   PONV prophylaxis: Ondansetron (or other 5HT-3), Dexamethasone or Solumedrol     Comments:                JEVON Diaz CRNA

## 2021-07-01 ENCOUNTER — HOSPITAL ENCOUNTER (OUTPATIENT)
Facility: CLINIC | Age: 71
Discharge: HOME | End: 2021-07-01
Attending: OTOLARYNGOLOGY | Admitting: OTOLARYNGOLOGY
Payer: MEDICARE

## 2021-07-01 ENCOUNTER — ANESTHESIA (OUTPATIENT)
Dept: SURGERY | Facility: CLINIC | Age: 71
End: 2021-07-01
Payer: MEDICARE

## 2021-07-01 VITALS
OXYGEN SATURATION: 96 % | WEIGHT: 161 LBS | RESPIRATION RATE: 14 BRPM | HEIGHT: 67 IN | BODY MASS INDEX: 25.27 KG/M2 | DIASTOLIC BLOOD PRESSURE: 85 MMHG | SYSTOLIC BLOOD PRESSURE: 164 MMHG | TEMPERATURE: 98 F | HEART RATE: 68 BPM

## 2021-07-01 DIAGNOSIS — R49.0 DYSPHONIA: ICD-10-CM

## 2021-07-01 DIAGNOSIS — R49.0 MUSCLE TENSION DYSPHONIA: ICD-10-CM

## 2021-07-01 DIAGNOSIS — J38.01 VOCAL FOLD PARALYSIS, LEFT: ICD-10-CM

## 2021-07-01 DIAGNOSIS — R59.0 MEDIASTINAL LYMPHADENOPATHY: ICD-10-CM

## 2021-07-01 DIAGNOSIS — C45.9 MESOTHELIOMA, MALIGNANT (H): ICD-10-CM

## 2021-07-01 DIAGNOSIS — C49.9 SPINDLE CELL SARCOMA (H): ICD-10-CM

## 2021-07-01 PROCEDURE — 272N000001 HC OR GENERAL SUPPLY STERILE: Performed by: OTOLARYNGOLOGY

## 2021-07-01 PROCEDURE — 710N000012 HC RECOVERY PHASE 2, PER MINUTE: Performed by: OTOLARYNGOLOGY

## 2021-07-01 PROCEDURE — 710N000009 HC RECOVERY PHASE 1, LEVEL 1, PER MIN: Performed by: OTOLARYNGOLOGY

## 2021-07-01 PROCEDURE — 250N000009 HC RX 250: Performed by: OTOLARYNGOLOGY

## 2021-07-01 PROCEDURE — 999N000141 HC STATISTIC PRE-PROCEDURE NURSING ASSESSMENT: Performed by: OTOLARYNGOLOGY

## 2021-07-01 PROCEDURE — 31571 LARYNGOSCOP W/VC INJ + SCOPE: CPT | Performed by: OTOLARYNGOLOGY

## 2021-07-01 PROCEDURE — 250N000009 HC RX 250

## 2021-07-01 PROCEDURE — C1878 MATRL FOR VOCAL CORD: HCPCS | Performed by: OTOLARYNGOLOGY

## 2021-07-01 PROCEDURE — 250N000013 HC RX MED GY IP 250 OP 250 PS 637: Performed by: OTOLARYNGOLOGY

## 2021-07-01 PROCEDURE — 258N000003 HC RX IP 258 OP 636: Performed by: NURSE ANESTHETIST, CERTIFIED REGISTERED

## 2021-07-01 PROCEDURE — 360N000076 HC SURGERY LEVEL 3, PER MIN: Performed by: OTOLARYNGOLOGY

## 2021-07-01 PROCEDURE — 250N000009 HC RX 250: Performed by: NURSE ANESTHETIST, CERTIFIED REGISTERED

## 2021-07-01 PROCEDURE — 250N000025 HC SEVOFLURANE, PER MIN: Performed by: OTOLARYNGOLOGY

## 2021-07-01 PROCEDURE — 250N000011 HC RX IP 250 OP 636

## 2021-07-01 PROCEDURE — 370N000017 HC ANESTHESIA TECHNICAL FEE, PER MIN: Performed by: OTOLARYNGOLOGY

## 2021-07-01 PROCEDURE — 250N000013 HC RX MED GY IP 250 OP 250 PS 637: Performed by: NURSE ANESTHETIST, CERTIFIED REGISTERED

## 2021-07-01 DEVICE — IMP VOCAL CORD PROLARYN GEL INJ 1ML 8602MOK5: Type: IMPLANTABLE DEVICE | Site: THROAT | Status: FUNCTIONAL

## 2021-07-01 RX ORDER — GABAPENTIN 300 MG/1
300 CAPSULE ORAL ONCE
Status: COMPLETED | OUTPATIENT
Start: 2021-07-01 | End: 2021-07-01

## 2021-07-01 RX ORDER — FENTANYL CITRATE 50 UG/ML
INJECTION, SOLUTION INTRAMUSCULAR; INTRAVENOUS PRN
Status: DISCONTINUED | OUTPATIENT
Start: 2021-07-01 | End: 2021-07-01

## 2021-07-01 RX ORDER — DEXAMETHASONE SODIUM PHOSPHATE 4 MG/ML
INJECTION, SOLUTION INTRA-ARTICULAR; INTRALESIONAL; INTRAMUSCULAR; INTRAVENOUS; SOFT TISSUE PRN
Status: DISCONTINUED | OUTPATIENT
Start: 2021-07-01 | End: 2021-07-01

## 2021-07-01 RX ORDER — LIDOCAINE HYDROCHLORIDE 40 MG/ML
INJECTION, SOLUTION RETROBULBAR PRN
Status: DISCONTINUED | OUTPATIENT
Start: 2021-07-01 | End: 2021-07-01 | Stop reason: HOSPADM

## 2021-07-01 RX ORDER — ONDANSETRON 4 MG/1
4 TABLET, ORALLY DISINTEGRATING ORAL EVERY 30 MIN PRN
Status: DISCONTINUED | OUTPATIENT
Start: 2021-07-01 | End: 2021-07-01 | Stop reason: HOSPADM

## 2021-07-01 RX ORDER — SODIUM CHLORIDE, SODIUM LACTATE, POTASSIUM CHLORIDE, CALCIUM CHLORIDE 600; 310; 30; 20 MG/100ML; MG/100ML; MG/100ML; MG/100ML
INJECTION, SOLUTION INTRAVENOUS CONTINUOUS
Status: DISCONTINUED | OUTPATIENT
Start: 2021-07-01 | End: 2021-07-01 | Stop reason: HOSPADM

## 2021-07-01 RX ORDER — IBUPROFEN 400 MG/1
400 TABLET, FILM COATED ORAL
Status: DISCONTINUED | OUTPATIENT
Start: 2021-07-01 | End: 2021-07-01 | Stop reason: HOSPADM

## 2021-07-01 RX ORDER — OXYCODONE HYDROCHLORIDE 5 MG/1
5 TABLET ORAL EVERY 4 HOURS PRN
Status: DISCONTINUED | OUTPATIENT
Start: 2021-07-01 | End: 2021-07-01 | Stop reason: HOSPADM

## 2021-07-01 RX ORDER — ALBUTEROL SULFATE 0.83 MG/ML
2.5 SOLUTION RESPIRATORY (INHALATION) EVERY 4 HOURS PRN
Status: DISCONTINUED | OUTPATIENT
Start: 2021-07-01 | End: 2021-07-01 | Stop reason: HOSPADM

## 2021-07-01 RX ORDER — NALOXONE HYDROCHLORIDE 0.4 MG/ML
0.2 INJECTION, SOLUTION INTRAMUSCULAR; INTRAVENOUS; SUBCUTANEOUS
Status: DISCONTINUED | OUTPATIENT
Start: 2021-07-01 | End: 2021-07-01 | Stop reason: HOSPADM

## 2021-07-01 RX ORDER — PROPOFOL 10 MG/ML
INJECTION, EMULSION INTRAVENOUS PRN
Status: DISCONTINUED | OUTPATIENT
Start: 2021-07-01 | End: 2021-07-01

## 2021-07-01 RX ORDER — FENTANYL CITRATE 50 UG/ML
25-50 INJECTION, SOLUTION INTRAMUSCULAR; INTRAVENOUS EVERY 5 MIN PRN
Status: DISCONTINUED | OUTPATIENT
Start: 2021-07-01 | End: 2021-07-01 | Stop reason: HOSPADM

## 2021-07-01 RX ORDER — LIDOCAINE 40 MG/G
CREAM TOPICAL
Status: DISCONTINUED | OUTPATIENT
Start: 2021-07-01 | End: 2021-07-01 | Stop reason: HOSPADM

## 2021-07-01 RX ORDER — DIMENHYDRINATE 50 MG/ML
25 INJECTION, SOLUTION INTRAMUSCULAR; INTRAVENOUS
Status: DISCONTINUED | OUTPATIENT
Start: 2021-07-01 | End: 2021-07-01 | Stop reason: HOSPADM

## 2021-07-01 RX ORDER — IBUPROFEN 200 MG
400 TABLET ORAL EVERY 6 HOURS PRN
Qty: 200 TABLET | Refills: 1 | Status: SHIPPED | OUTPATIENT
Start: 2021-07-01 | End: 2021-07-08

## 2021-07-01 RX ORDER — ONDANSETRON 2 MG/ML
4 INJECTION INTRAMUSCULAR; INTRAVENOUS EVERY 30 MIN PRN
Status: DISCONTINUED | OUTPATIENT
Start: 2021-07-01 | End: 2021-07-01 | Stop reason: HOSPADM

## 2021-07-01 RX ORDER — NALOXONE HYDROCHLORIDE 0.4 MG/ML
0.4 INJECTION, SOLUTION INTRAMUSCULAR; INTRAVENOUS; SUBCUTANEOUS
Status: DISCONTINUED | OUTPATIENT
Start: 2021-07-01 | End: 2021-07-01 | Stop reason: HOSPADM

## 2021-07-01 RX ORDER — OXYCODONE HYDROCHLORIDE 5 MG/1
5 TABLET ORAL
Status: COMPLETED | OUTPATIENT
Start: 2021-07-01 | End: 2021-07-01

## 2021-07-01 RX ORDER — ONDANSETRON 2 MG/ML
INJECTION INTRAMUSCULAR; INTRAVENOUS PRN
Status: DISCONTINUED | OUTPATIENT
Start: 2021-07-01 | End: 2021-07-01

## 2021-07-01 RX ORDER — ACETAMINOPHEN 325 MG/1
975 TABLET ORAL ONCE
Status: COMPLETED | OUTPATIENT
Start: 2021-07-01 | End: 2021-07-01

## 2021-07-01 RX ORDER — LIDOCAINE HYDROCHLORIDE 10 MG/ML
INJECTION, SOLUTION INFILTRATION; PERINEURAL PRN
Status: DISCONTINUED | OUTPATIENT
Start: 2021-07-01 | End: 2021-07-01

## 2021-07-01 RX ORDER — FENTANYL CITRATE 50 UG/ML
25-50 INJECTION, SOLUTION INTRAMUSCULAR; INTRAVENOUS
Status: CANCELLED | OUTPATIENT
Start: 2021-07-01

## 2021-07-01 RX ORDER — ONDANSETRON 4 MG/1
4 TABLET, ORALLY DISINTEGRATING ORAL
Status: DISCONTINUED | OUTPATIENT
Start: 2021-07-01 | End: 2021-07-01 | Stop reason: HOSPADM

## 2021-07-01 RX ADMIN — GABAPENTIN 300 MG: 300 CAPSULE ORAL at 07:29

## 2021-07-01 RX ADMIN — ONDANSETRON 4 MG: 2 INJECTION INTRAMUSCULAR; INTRAVENOUS at 08:27

## 2021-07-01 RX ADMIN — LIDOCAINE HYDROCHLORIDE 50 MG: 10 INJECTION, SOLUTION INFILTRATION; PERINEURAL at 08:23

## 2021-07-01 RX ADMIN — SODIUM CHLORIDE, POTASSIUM CHLORIDE, SODIUM LACTATE AND CALCIUM CHLORIDE: 600; 310; 30; 20 INJECTION, SOLUTION INTRAVENOUS at 07:45

## 2021-07-01 RX ADMIN — ACETAMINOPHEN 975 MG: 325 TABLET, FILM COATED ORAL at 07:28

## 2021-07-01 RX ADMIN — DEXAMETHASONE SODIUM PHOSPHATE 4 MG: 4 INJECTION, SOLUTION INTRA-ARTICULAR; INTRALESIONAL; INTRAMUSCULAR; INTRAVENOUS; SOFT TISSUE at 08:27

## 2021-07-01 RX ADMIN — Medication 100 MG: at 08:23

## 2021-07-01 RX ADMIN — FENTANYL CITRATE 100 MCG: 50 INJECTION, SOLUTION INTRAMUSCULAR; INTRAVENOUS at 08:23

## 2021-07-01 RX ADMIN — OXYCODONE HYDROCHLORIDE 5 MG: 5 TABLET ORAL at 09:50

## 2021-07-01 RX ADMIN — LIDOCAINE HYDROCHLORIDE 0.1 ML: 10 INJECTION, SOLUTION EPIDURAL; INFILTRATION; INTRACAUDAL; PERINEURAL at 07:45

## 2021-07-01 RX ADMIN — PROPOFOL 170 MG: 10 INJECTION, EMULSION INTRAVENOUS at 08:23

## 2021-07-01 ASSESSMENT — MIFFLIN-ST. JEOR: SCORE: 1425.98

## 2021-07-01 NOTE — OP NOTE
PREOPERATIVE DIAGNOSES:  Left true vocal fold motion impairment, dysphonia, dysphagia.     POSTOPERATIVE DIAGNOSES: Same.      PROCEDURE PERFORMED:   1.  Mircrodirect laryngoscopy with left true vocal fold augmentation using Prolaryn Gel     SURGEON: Kyree Bearden MD      ASSISTANTS: None.     BLOOD LOSS: Scant.      COMPLICATIONS: None.      SPECIMENS: None.     ANESTHESIA: General.     GRAFTS, IMPLANTS, DRAINS: None.     INDICATIONS: Prevent complications, treat disease.     FINDINGS:   1. Atrophic left true vocal fold in a fixed, paramedian position.      OPERATIVE TECHNIQUE: The patient was brought to the operating room and identified by name clinic number.  They were placed supinely on the operating room table and general endotracheal anesthesia was induced by the anesthesia service.    Patient was intubated with a 6 microlaryngeal tube.  The bed was rotated 90 degrees and the patient was prepped and draped in standard fashion.  The teeth were protected with a mouthguard.  After standard surgical pause, the patient's larynx was suspended with the Dedo laryngoscope.  Care was taken not to injure the teeth, tongue, lips, gums with insertion.  The base of tongue, supraglottic, and glottic structures were within normal limits.  The laryngeal structures were visualized with a 0 degree rigid endoscope and was used throughout the remainder of the case.  Photographs were taken.  The patient was suspended.      Next, Prolaryn gel on a long laryngeal needle was injected just medial to the muscular process of the left true vocal fold.  The injection was placed in the superficial lamina propria.  The patient had excellent infraglottic fill.  A total of 1 mL was injected in the left true vocal fold.  Hemostasis was assured.  The larynx was anesthetized with 3 mL of 4% topical lidocaine.  Patient was de-suspended and all hardware was then removed from the mouth.     This marked the end of the procedure.  The patient was  then turned over to anesthesia for recovery where they were awakened, extubated, and transferred to the PACU in excellent condition.  There were no complications.  There was minimal blood loss.  All standard operating room protocol and universal precautions were used throughout the procedure.     Kyree Bearden MD  Department of Otolarygology-Head and Neck Surgery  [facility]

## 2021-07-01 NOTE — ANESTHESIA CARE TRANSFER NOTE
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    Anesthesia Type:   No value filed.     Note:    Oropharynx: oropharynx clear of all foreign objects  Level of Consciousness: drowsy  Oxygen Supplementation: face mask    Independent Airway: airway patency satisfactory and stable  Dentition: dentition unchanged  Vital Signs Stable: post-procedure vital signs reviewed and stable  Report to RN Given: handoff report given  Patient transferred to: PACU    Handoff Report: Identifed the Patient, Identified the Reponsible Provider, Reviewed the pertinent medical history, Discussed the surgical course, Reviewed Intra-OP anesthesia mangement and issues during anesthesia, Set expectations for post-procedure period and Allowed opportunity for questions and acknowledgement of understanding      Vitals: (Last set prior to Anesthesia Care Transfer)  CRNA VITALS  7/1/2021 0821 - 7/1/2021 0855      7/1/2021             Pulse:  94    SpO2:  100 %    Resp Rate (observed):  (!) 6        Electronically Signed By: Amanda Sims  July 1, 2021  8:55 AM

## 2021-07-01 NOTE — ANESTHESIA PROCEDURE NOTES
Airway      Staff -        CRNA: Dale Aguilera, APRN CRNA       Other Anesthesia Staff: Amanda Sims       Performed By: CRNA and SRNA  Consent for Airway        Urgency: elective  Indications and Patient Condition       Indications for airway management: teresa-procedural       Induction type:intravenous       Mask difficulty assessment: 2 - vent by mask + OA or adjuvant +/- NMBA    Final Airway Details       Final airway type: endotracheal airway       Successful airway: ETT - single  Endotracheal Airway Details        ETT size (mm): 5.0       Cuffed: yes       Successful intubation technique: video laryngoscopy       VL Blade Size: Barnhart 3       Grade View of Cords: 1       Adjucts: stylet       Position: Left       Measured from: gums/teeth       Secured at (cm): 23       Bite block used: Oral Airway    Post intubation assessment        Placement verified by: capnometry, equal breath sounds and chest rise        Number of attempts at approach: 1       Number of other approaches attempted: 0       Secured with: silk tape       Ease of procedure: easy       Dentition: Intact and Unchanged    Medication(s) Administered   Medication Administration Time: 7/1/2021 8:25 AM

## 2021-07-01 NOTE — ANESTHESIA POSTPROCEDURE EVALUATION
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    Anesthesia Type:  General    Note:  Disposition: Outpatient   Postop Pain Control: Uneventful            Sign Out: Well controlled pain   PONV: No   Neuro/Psych: Uneventful            Sign Out: Acceptable/Baseline neuro status   Airway/Respiratory: Uneventful            Sign Out: Acceptable/Baseline resp. status   CV/Hemodynamics: Uneventful            Sign Out: Acceptable CV status; No obvious hypovolemia; No obvious fluid overload   Other NRE: NONE   DID A NON-ROUTINE EVENT OCCUR? No           Last vitals:  Vitals:    07/01/21 0926 07/01/21 0930 07/01/21 1015   BP:  (!) 156/93 (!) 164/85   Pulse:  68    Resp:  13 14   Temp:   36.7  C (98  F)   SpO2: 97% 97% 96%       Last vitals prior to Anesthesia Care Transfer:  CRNA VITALS  7/1/2021 0821 - 7/1/2021 0921      7/1/2021             Pulse:  94    SpO2:  100 %    Resp Rate (observed):  (!) 6          Electronically Signed By: JEVON Redmond CRNA  July 1, 2021  2:02 PM

## 2021-07-01 NOTE — DISCHARGE INSTRUCTIONS
Discharge Instructions Following Vocal Cord Surgery    Recovery - Everyone recovers differently from a general anesthetic.  Symptoms such as fatigue, nausea, lightheadedness, and sometimes a low grade fever (up to 100 degrees) are not unusual.  As your body removes the anesthetic drugs from circulation, these symptoms will resolve.  Your throat will be sore for several days, and possibly up to a week after surgery.  Soreness and numbness of the tongue are also common for several days after surgery.  Occasionally patients notice alteration of their taste for period of time after surgery.    Medications - Usually people tolerate the discomfort during your recovery by using Tylenol or ibuprofen (advil). Rest the voice for 24 hours after the procedure.  When you do speak, do so in a normal tone of voice. Do not whisper, but do not speak loudly.  Avoid conversations in noisy environments such as automobiles. Also, plenty of non-alcoholic beverages are essential to stay hydrated.  Smoking is also extremely damaging to the healing process of vocal cords.    Follow up - The follow up will consist of another fiberoptic scope exam in 2-3 months to evaluate the healing of the vocal cords.    If there are any questions or issues with the above, or if there are other issues that concern you, always feel free to call the clinic and I am happy to speak with you as soon as feasible.    Kyree Bearden MD  Department of Otolarygology-Head and Neck Surgery  [facility]  [phone] or [phone] After hours, [facility] option                        Same Day Surgery Discharge Instructions  Special Precautions After Surgery - Adult    1. It is not unusual to feel lightheaded or faint, up to 24 hours after surgery or while taking pain medication.  If you have these symptoms; sit for a few minutes before standing and have someone assist you when getting up.  2. You should rest and relax for the next 24 hours and  must have someone stay with you for at least 24 hours after your discharge.  3. DO NOT DRIVE any vehicle or operate mechanical equipment for 24 hours following the end of your surgery.  DO NOT DRIVE while taking narcotic pain medications that have been prescribed by your physician.  If you had a limb operated on, you must be able to use it fully to drive.  4. DO NOT drink alcoholic beverages for 24 hours following surgery or while taking prescription pain medication.  5. Drink clear liquids (apple juice, ginger ale, broth, 7-Up, etc.).  Progress to your regular diet as you feel able.  6. Any questions call your physician and do not make important decisions for 24 hours.    __________________________________________________________________________________________________________________________________  IMPORTANT NUMBERS:    [facility] Main Number:  [phone], [phone]  Pharmacy:  [phone]  Same Day Surgery:  [phone], Monday - Friday until 8:30 p.m.   Clinic:  [phone]   Surgery Specialty Clinic:  [phone]

## 2021-07-05 ENCOUNTER — HOSPITAL ENCOUNTER (OUTPATIENT)
Dept: SPEECH THERAPY | Facility: CLINIC | Age: 71
Setting detail: THERAPIES SERIES
End: 2021-07-05
Attending: OTOLARYNGOLOGY
Payer: MEDICARE

## 2021-07-05 DIAGNOSIS — J38.01 VOCAL FOLD PARALYSIS, LEFT: ICD-10-CM

## 2021-07-05 DIAGNOSIS — R49.0 MUSCLE TENSION DYSPHONIA: ICD-10-CM

## 2021-07-05 DIAGNOSIS — R49.0 DYSPHONIA: ICD-10-CM

## 2021-07-05 PROCEDURE — 92524 BEHAVRAL QUALIT ANALYS VOICE: CPT | Mod: GN | Performed by: SPEECH-LANGUAGE PATHOLOGIST

## 2021-07-05 PROCEDURE — 92507 TX SP LANG VOICE COMM INDIV: CPT | Mod: GN | Performed by: SPEECH-LANGUAGE PATHOLOGIST

## 2021-07-05 NOTE — PROGRESS NOTES
[facility]          OUTPATIENT SPEECH LANGUAGE PATHOLOGY VOICE EVALUATION  PLAN OF TREATMENT FOR OUTPATIENT REHABILITATION  (COMPLETE FOR INITIAL CLAIMS ONLY)    Patient's Last Name, First Name, M.I.                  YOB: 1950  Ifrah Huitron                        Provider s Name: [facility] Medical Record No.  [id number]     Onset Date:      Start of Care Date:     Type:     ___PT  __OT   _X_SLP    Medical Diagnosis: Vocal fold paralysis, left;  Dysphonia; Muscle tension dysphonia   Speech Language Pathology Diagnosis:  Muscle tension dysphonia    Visits from SOC: 1      _________________________________________________________________________________  Plan of Treatment/Functional Goals:  Voice  decrease laryngeal tension, breath support, vocal hygiene      Goals     1. Goal Identifier: vocal hygiene/ ID vocal misuse       Goal Description: Pt will report understanding of vocal hygiene and ID 2 areas of misuse.       Target Date: 07/05/21   2. Goal Identifier: decrease laryngeal tension       Goal Description: PT will ID 2 strategies to decrease laryngeal tension (massage, stretches) that are effective 75% of the time.       Target Date: 08/04/21   3. Goal Identifier: resonant tx, easy onset       Goal Description: Pt will report vocal quality of 8/10 with use of vocal strategies.       Target Date: 08/19/21                 Frequency and Duration: 1x/2 wks x 6 weeks  Jemima Mantilla, SLP       I CERTIFY THE NEED FOR THESE SERVICES FURNISHED UNDER        THIS PLAN OF TREATMENT AND WHILE UNDER MY CARE     (Physician co-signature of this document indicates review and certification of the therapy plan).                Certification Date From:  07/05/21  Certification Date To:  08/19/21                 Initial Assessment        See Epic Evaluation Start of Care

## 2021-07-05 NOTE — PROGRESS NOTES
Voice Evaluation  07/05/21    General Information   Type Of Visit Initial   Start Of Care Date 07/05/21   Referring Physician Kyree Bearden MD   Orders Evaluate And Treat   Orders Comment Left true vocal fold paralysis, upcoming true vocal fold injection, patient also has concomitant muscle tension dysphonia   Medical Diagnosis Vocal fold paralysis, left;  Dysphonia; Muscle tension dysphonia   Onset Of Illness/injury Or Date Of Surgery 06/21/21  (order date)   Precautions/Limitations  no known precautions/limitations   Hearing WFL during exam   Avocational voice uses Pt reports he generally talks a lot and is social along with still working up until a month ago.   Surgical/Medical history reviewed Yes   Pertinent History Of Current Problem Per ENT note: dx of dysphonia likely secondary to left true vocal fold motion impairment from his mediastinal mass.  We discussed treatment to include both vocal fold augmentation and speech therapy to help with voice use given his frequent voice use.Had procedure 7/1/21: Mircrodirect laryngoscopy with left true vocal fold augmentation using Prolaryn Gel  due to dx of Left true vocal fold motion impairment, dysphonia, dysphagia.   Prior Level Of Function Comment Pt reports had vocal quality of 1/10 (10 is best) prior to vocal cord injection last week.   Patient Role/employment History Employed   Living environment [facility]   General Observations Pt is pleasant and cooperative.   Patient/family Goals To live my best life with as much quality as possible.   General Information Comments Pt works as a  and uses voice often with clients and on the phone.  Pt states overused voice 2 days after vocal fold injection and states it felt sore.   Fall Risk Screen   Fall screen completed by SLP   Have you fallen 2 or more times in the past year? No   Have you fallen and had an injury in the past year? No   Is patient a fall risk? No   Abuse Screen (yes response  referral indicated)   Feels Unsafe at Home or Work/School no   Feels Threatened by Someone no   Does Anyone Try to Keep You From Having Contact with Others or Doing Things Outside Your Home? no   Physical Signs of Abuse Present no   Pain Assessment   Pain Reported Yes   Pain Location throat   Pain Scale 1/10   Comments increases after use   Personal Rating / Voice Use Rating   Describe the amount of voice use required for your job Moderate   Describe the intensity of voice use required for your job Moderate   Describe the amount of typical non-work voice use Moderate   Describe the intensity of typical non-work voice use Moderate   Comments has recently reduced talking at work and decreasing phone use.   Voice Profile during conversation, 1 min monologue and paragraph reading   Voice Quality Scratchy;Rough   Voice quality comments pt judges 7/10 (10 is best)    Breath control WNL   Breath Control comments 15 seconds until glottal [place]   Breath control severity rating continuum (1=Severe, 7=WNL) 6   Voice Use / Effort Hard glottal attacks;Pinched / squeezed larynx   Voice Use / Effort comments Pt reports he feels he 'pushes' to get voice out.   Pitch / Frequency comments reduced pitch range.  States feels easier to produce lower pitches.   Volume comments WFL   Neuromuscular comments left Vocal fold paralysis 2/2 trauma during biopsy which was treated 7/1/21 with vocal fold injection.   Resonance WNL   Comments Pt voice characterized as rough in quality but able to produce voice.  Pt has effortful speech with harder onsets.  He reports he had more throat pain after a lot of talking yesterday and voice worsens with excessive use.  Overall limited pitch range.  He is able to adjust volume and breath support and stated when he was relaxed during the session it was easier to focus on smooth, breathy speech with decreased laryngeal tension and it felt better to talk.   Unilateral Larynx Function   Alternating head turns to  "right and left Easier phonation to right   Comments Pt reports some general right side throat pain throughout session.   Function of Lengtheners / Shorteners (CT and TA Muscles)   Pitch glides Upper pitches more dysphonic;Limited range   Respiratory Function Screening   Longest prolonged \"S\" from S/Z ratio (#of sec) 15   General Therapy Interventions   Planned Therapy Interventions Voice   Voice Breath flow to sound flow;Resonant voice techniques;Voice quality/pitch or volume tasks   Intervention Comments decrease laryngeal tension, breath support, vocal hygiene   Impressions and Recommendations   Communication Diagnosis Muscle tension dysphonia   Frequency and Duration 1x/2 wks x 6 weeks   Prognosis  Good with intervention   Risks and Benefits of Treatment have been explained. Yes   Patient & /or Caregiver  in agreement with plan of care Yes   Educational Assessment   Barriers to Learning No barriers   Preferred Learning Style Listening   Voice Goals   Voice Goals 1;2;3   Voice Goal 1   Goal Identifier vocal hygiene/ ID vocal misuse   Goal Description Pt will report understanding of vocal hygiene and ID 2 areas of misuse.   Target Date 07/05/21   Date Met 07/05/21   Voice Goal 2   Goal Identifier decrease laryngeal tension   Goal Description PT will ID 2 strategies to decrease laryngeal tension (massage, stretches) that are effective 75% of the time.   Target Date 08/04/21   Voice Goal 3   Goal Identifier resonant tx, easy onset   Goal Description Pt will report vocal quality of 8/10 with use of vocal strategies.   Target Date 08/19/21   Total Session Time   Total Evaluation Time 20 eval+25 tx   Therapy Certification   Certification date from 07/05/21   Certification date to 08/19/21   Medical Diagnosis Vocal fold paralysis, left;  Dysphonia; Muscle tension dysphonia   Certification I certify the need for these services furnished under this plan of treatment and while under my care.  (Physician co-signature of this " document indicates review and certification of the therapy plan).

## 2021-07-06 NOTE — TELEPHONE ENCOUNTER
Microlaryngoscopy, vocal cord paralysis and vocal cord injection w/ yaya 7/1/2021    Please see Bitium messages. Any recommendations to help his cough?     Thank you,   Juancarlos LOVE RN   Specialty Clinics

## 2021-07-07 RX ORDER — CODEINE PHOSPHATE/GUAIFENESIN 10-100MG/5
10 LIQUID (ML) ORAL EVERY 4 HOURS PRN
Qty: 300 ML | Refills: 0 | Status: SHIPPED | OUTPATIENT
Start: 2021-07-07 | End: 2021-08-23

## 2021-07-07 NOTE — PROGRESS NOTES
Ifrah is a 71 year old who is being evaluated via a billable video visit.      How would you like to obtain your AVS? MyChart  If the video visit is dropped, the invitation should be resent by: Text to cell phone: [phone]  Will anyone else be joining your video visit? Emma     Joselin DELATORRE        Video Start Time: appx 11:35am    Video-Visit Details    Type of service:  Video Visit    Video End Time: appx 12:10am    Originating Location (pt. Location): Home    Distant Location (provider location):  [facility]     Platform used for Video Visit: [facility]    Oncology/Hematology Visit Note  Jul 12, 2021    Reason for Visit: Follow up of spindle cell sarcoma    History of Present Illness: Ifrah Huitron is a 71 year old male with PMH rosacea, pituitary macroadenoma s/p resection, GERD, kidney stones, DJD with metastatic spindle cell sarcoma. He presented to his PCP March 2021 with chest pain/left shoulder and back pain that led to imaging which revealed multiple lung mets. There were difficulties in getting diagnostic biopsy, eventually biopsy of mediastinal mass with spindle cell sarcoma. There was high PD-L1 expression (90%). Baseline imaging 6/21/21 with progression of lung mets and left-sided subdiaphragmatic mass, stable liver lesions. 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     He started Keytruda 6/21/21. He was called today for oncology follow-up.     Interval History:  Ifrah was called today for follow-up.   -He had issues with diarrhea and upset stomach 7/1-7/8. At its worst he was having 4-5 episodes of watery diarrhea per day. Was taking Pepto Bismol for this, no imodium though he did buy it. He notes his stools were dark color but no coffee ground like stools or bleeding.   -No nausea or vomiting though did have decreased appetite when diarrhea and dyspepsia were at there worst. He was doing  tums and prilosec PRN  -All GI issues are now resolved-no diarrhea, stools normal color, no abdominal discomfort  -He had issues with sore throat and cough after vocal cord procedure on 7/1. Symptoms now improving, helped with Guaifenesin AC. Hoarseness very slightly improved  -Had issues with fatigue and low energy but this is improving. Still golfing   -Notes clear improvement in his left chest/back/shoulder pain since the first infusion, doing Celebrex BID but otherwise not needing any pain medications. Sleeping well whereas previously he had to use topical numbing meds.   -His Rosacea has worsened since starting Keytruda, still doing minocycline but has redness and worsening lesions on nose. Is not using any topical agents. No other skin concerns other than dryness   -Denies fevers, headaches, dizziness, chest pain, SOB, edema. Has noted slight neuropathy in bottom of feet.     Current Outpatient Medications   Medication Sig Dispense Refill     celecoxib (CELEBREX) 200 MG capsule Take 1 capsule (200 mg) by mouth 2 times daily . Note this is a new a strength! Only one capsule at a time! 60 capsule 3     cholecalciferol (VITAMIN D-1000 MAX ST) 1000 units TABS Take 1,000 Units by mouth daily        guaiFENesin-codeine (GUAIFENESIN AC) 100-10 MG/5ML syrup Take 10 mLs by mouth every 4 hours as needed for cough 300 mL 0     hydrocortisone (CORTEF) 10 MG tablet Take 15 mg in the morning and 10 mg in the afternoon       HYDROmorphone (DILAUDID) 2 MG tablet Take 0.5-1 tablets (1-2 mg) by mouth 3 times daily as needed for severe pain 14 tablet 0     levothyroxine (SYNTHROID/LEVOTHROID) 75 MCG tablet Take 75 mcg by mouth every morning        metroNIDAZOLE (METROGEL) 0.75 % external gel Apply topically 2 times daily 45 g 0     minocycline (MINOCIN/DYNACIN) 50 MG capsule Take 50 mg by mouth daily        omeprazole (PRILOSEC) 20 MG DR capsule Take 20 mg by mouth       prochlorperazine (COMPAZINE) 10 MG tablet Take 1 tablet  (10 mg) by mouth every 6 hours as needed for nausea or vomiting 30 tablet 3     triamcinolone (KENALOG) 0.1 % external cream Apply topically 2 times daily        Physical Examination:  /88 HR 73 Temp 97.7 RR 16 Weight 161lb Pain 0/10  Wt Readings from Last 10 Encounters:   07/01/21 73 kg (161 lb)   06/23/21 73.4 kg (161 lb 12.8 oz)   06/21/21 74.7 kg (164 lb 11.2 oz)   06/21/21 71.7 kg (158 lb)   06/08/21 71.8 kg (158 lb 6.4 oz)   04/29/21 73.9 kg (163 lb)   04/05/21 73.9 kg (163 lb)   03/29/21 74.1 kg (163 lb 6.4 oz)   03/23/21 73.2 kg (161 lb 6.4 oz)   03/10/21 73.9 kg (163 lb)     Video physical exam  General: Patient appears well in no acute distress.   Skin: No visualized rash or lesions on visualized skin though skin is slightly erythematous on face   Eyes: EOMI, no erythema, sclera icterus or discharge noted  Resp: Appears to be breathing comfortably without accessory muscle usage, speaking in full sentences, no cough. Mild hoarseness  MSK: Appears to have normal range of motion based on visualized movements  Neurologic: No apparent tremors, facial movements symmetric  Psych: affect normal, alert and oriented    The rest of a comprehensive physical examination is deferred due to PHE (public health emergency) video restrictions    Laboratory Data:  Results for MARISOL XIE (MRN [id number]) as of 7/12/2021 14:14   7/12/2021 10:59   WBC 8.5   Hemoglobin 13.8   Hematocrit 42.9   Platelet Count 251   RBC Count 4.96   MCV 87   MCH 27.8   MCHC 32.2   RDW 13.9   % Neutrophils 60   % Lymphocytes 22   % Monocytes 12   % Eosinophils 4   % Basophils 1   % Immature Granulocytes 1   NRBCs per 100 WBC 0   Absolute Neutrophils 5.1   Absolute Lymphocytes 1.9   Absolute Monocytes 1.0   Absolute Eosinophils 0.4   Absolute Basophils 0.1   Absolute Immature Granulocytes 0.1 (H)   Absolute NRBCs 0.0   Glucose 144 (H)   Sodium 142   Potassium 3.2 (L)   Chloride 108   Carbon Dioxide 27   Urea Nitrogen 11   Creatinine 0.66    GFR Estimate >90   Calcium 8.5   Anion Gap 7   Albumin 3.3 (L)   Protein Total 6.5 (L)   Bilirubin Total 0.5   ALT 24   AST 16   T4 Free 1.52 (H)   TSH 0.05 (L)   Alkaline Phosphatase 118       Assessment and Plan:  1. 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 High PD-L1. Started on Keytruda 6/21/21. Had issues with diarrhea and dyspepsia, though unclear if directly related to infusion as started after anesthesia for vocal cord procedure? Symptoms much improved now. Clinically responding to treatment.     Reviewed labs and OK to go ahead with cycle 2 Keytruda today. See discussion of thyroid and GI issues below. Will plan to do 3 cycles and then repeat CT imaging.     Foundation one requested previously-will make sure this was done.    2. GI  Diarrhea: Possibly related to Keytruda, though unusual to have colitis issues this early on. Symptoms improved now. Discussed Imodium PRN with cycle 2 and call if symptoms worsen. No need for infectious work-up given spontaneous resolution of symptoms.    Dyspepsia: Discussed taking Omeprazole daily. Can take Tums/Pepto Bismol PRN    Nausea: Compazine PRN    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 Cal Florenceina endocrine. Discussed with him today-should not have any issues with hydrocortisone and will continue current dosing.    TSH low and free T4 elevated, he is on Synthroid 75mcg daily. Suspect this will resolve and go to hypothyroidism with ongoing treatment and Dr. Mccall agreed that we should not change dose at this time. Will recheck TSH and free T4 in 3 weeks. Of note, Dr. Mccall recommends monitoring free T4 instead of TSH with his history.     4. Derm  Rosacea: Long standing issue, on minocycline daily. Symptoms worse with cycle 1 Keytruda. Discussed with derm and will start Metrogel PRN and refer to derm for ongoing assistance.    Continue lotion for dry skin.    5. MSK  Left  shoulder/back/chest wall pain 2/2 tumor, following with palliative. Continue topical Bengay and PO Celebrex. Pain significantly improved after cycle 1. Continue to monitor.    6. ENT  Hoarseness: Thought 2/2 mediastinal mass vs irritation from prior biopsy. Following with ENT, s/p vocal cord infection 7/1/21. Symptoms slightly improved.    Cough 2/2 7/1/21 vocal cord procedure, improving, continue Guaifenesin-[place] PRN     60 minutes spent on the date of the encounter doing chart review, review of test results, interpretation of tests, patient visit, documentation and discussion with other provider(s) including patient's endocrinologist and dermatology.      Maynor Rios PA-C  [facility]  [address], [place] [address] [phone]

## 2021-07-08 ENCOUNTER — VIRTUAL VISIT (OUTPATIENT)
Dept: PALLIATIVE CARE | Facility: CLINIC | Age: 71
End: 2021-07-08
Attending: INTERNAL MEDICINE
Payer: MEDICARE

## 2021-07-08 DIAGNOSIS — G89.3 CANCER ASSOCIATED PAIN: ICD-10-CM

## 2021-07-08 DIAGNOSIS — C45.9 MESOTHELIOMA, MALIGNANT (H): Primary | ICD-10-CM

## 2021-07-08 DIAGNOSIS — R11.2 NAUSEA AND VOMITING, INTRACTABILITY OF VOMITING NOT SPECIFIED, UNSPECIFIED VOMITING TYPE: ICD-10-CM

## 2021-07-08 PROCEDURE — 999N001193 HC VIDEO/TELEPHONE VISIT; NO CHARGE

## 2021-07-08 PROCEDURE — 99215 OFFICE O/P EST HI 40 MIN: CPT | Mod: 95 | Performed by: INTERNAL MEDICINE

## 2021-07-08 RX ORDER — CELECOXIB 100 MG/1
200 CAPSULE ORAL 2 TIMES DAILY
Qty: 60 CAPSULE | Refills: 3 | COMMUNITY
Start: 2021-07-08 | End: 2021-07-08

## 2021-07-08 RX ORDER — CELECOXIB 200 MG/1
200 CAPSULE ORAL 2 TIMES DAILY
Qty: 60 CAPSULE | Refills: 3 | Status: SHIPPED | OUTPATIENT
Start: 2021-07-08 | End: 2021-11-10

## 2021-07-08 RX ORDER — PROCHLORPERAZINE MALEATE 10 MG
10 TABLET ORAL EVERY 6 HOURS PRN
Qty: 30 TABLET | Refills: 3 | Status: ON HOLD | OUTPATIENT
Start: 2021-07-08 | End: 2021-10-26

## 2021-07-08 NOTE — PROGRESS NOTES
"Ifrah is a 71 year old who is being evaluated via a billable video visit.      How would you like to obtain your AVS? MyChart  If the video visit is dropped, the invitation should be resent by: Text to cell phone: [phone]  Will anyone else be joining your video visit? No       I have reviewed and updated the patient's allergies and medication list.    Concerns: none  Refills: celebrex needs refill is continued    Vitals - Patient Reported  Weight (Patient Reported): 72.6 kg (160 lb)  Height (Patient Reported): 168.9 cm (5' 6.5\")  BMI (Based on Pt Reported Ht/Wt): 25.44  Pain Score: No Pain (1)    Izabella Trujillo [facility]    Palliative Care Outpatient Clinic    (This note was transcribed using voice recognition software. While I review and edit the transcription, I may miss errors, and the software sometimes does unexpected capitalizations and formatting that I miss. Please let me know of any serious mistranscriptions and I will addend this note.)    Patient ID:  Medical - April 2021 dx with malignant sarcomatoid mesothelioma of L lung/pleura; tumor has high PDL1 expression and he started pembro 6/2021.    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 5 kids, many grandkids. Owns a golf course.    Care Planning - discussed 6/21 palliative visit, knows cancer is incurable and goals of his cancer tx.       History:  History gathered today from: patient, family/loved ones, medical chart    Last visit with rec'd scheduled APAP, celecoxib, and prn hydromorphone due to prior dysphoria from oxycodone.  He called us 6/23, pain no better,had not taken any hydromorphone which we recommended he at least try.  He did try 1mg dilaudid once and it didn't help and he stopped it.  But then his pain has mostly improved ~spontaneously since then. It even went away entirely for a few days then came back but more modestly earlier this week.  He also uses Guerrero Reich liberally & that seems to really " help.  He had facial pain after taking tylenol and has stopped that.  He continues on 100 mg celecoxib bid without issue.  Started taking 200 mg ibuprofen daily too ?after ENT procedure.    He has low energy, apathy, diarrhea the last couple weeks, brain fog. Nausea, low appetite; worse after pembro he had a few weeks ago.Takes omeprazole.      7/1 he had the L vocal fold augmentation and is now is ST. His speech is improving somewhat.   Has increased cough & was Rx'd codeine cough syrup but has not taken it due to concern with dilaudid.          PE: There were no vitals taken for this visit.   Wt Readings from Last 3 Encounters:   07/01/21 73 kg (161 lb)   06/23/21 73.4 kg (161 lb 12.8 oz)   06/21/21 74.7 kg (164 lb 11.2 oz)     Alert NAD  Clear sensorium      Data reviewed:  I reviewed recent labs and imaging, my comments:  Cr 0.7  CT 6/21  FINDINGS: An anterior mediastinal mass on axial image 23 is larger. It  measures 4.7 cm compared to 4.2 cm on the prior study. Another  lobulated mass and/or adenopathy in the AP window is larger. It  measures 3.5 cm compared to 2.7 cm previously. Another nodule body in  the anterior pericardial margin on axial image 32 is larger at 1.4 cm  compared to 1.1 cm on the prior study. A pleural-based lesion in the  left hemithorax on axial image 24 is larger at 3.2 cm compared to 2.6  cm previously. A left pleural effusion is present, modestly larger  compared to the prior study. A left-sided subdiaphragmatic mass on  axial image 48 is larger at 6.5 cm compared to 5 cm on the prior  study. Diaphragmatic invasion is again suspected. A few subcentimeter  liver lesions are stable.                                                                      IMPRESSION:  Neoplastic disease in the chest and left upper quadrant  of the abdomen is worse.     database reviewed: y      Impression & Recommendations:  72 yo man with a metastatic sarcomatoid mesothelioma on  immunotherapy    Pain--improving spontaneously ?immunotherapy working--I d/w him I don't know that for sure but it is a hopeful sign  Increase celecoxib to 200 mg bid  No more ibuprofen  He's stopped APAP & is not taking hydromorphone currently    Cough--  Let him know it's ok to take the cough syrup ENT ordered, given he is not taking dilaudid at all. We discussed it is constipating.     Diarrhea--improving now but seems to be from pembro, temporally? He sees his onc team on Monday and I instructed him he needs to tell them he had diarrhea     Nausea--Rx'd prochlorperazine.  He has had an unusual amount of GI problems after his first dose of pembrolizumab.     45 minutes spent on the date of the encounter doing chart review, history and exam, patient education & counseling, documentation and other activities as noted above.    Thank you for involving us in the patient's care.   Sal Handy MD / Palliative Medicine / Text me via [facility].      Originating Location (pt. Location): Home    Distant Location (provider location):  [facility]     Platform used for Video Visit: [employer]

## 2021-07-08 NOTE — LETTER
"7/8/2021       RE: Ifrah Huitron  [address] [place] [address]     Dear Colleague,    Thank you for referring your patient, If[facility]uitron[facility]. Please see a copy of my visit note below.    Ifrah is a 71 year old who is being evaluated via a billable video visit.      How would you like to obtain your AVS? MyChart  If the video visit is dropped, the invitation should be resent by: Text to cell phone: [phone]  Will anyone else be joining your video visit? No       I have reviewed and updated the patient's allergies and medication list.    Concerns: none  Refills: celebrex needs refill is continued    Vitals - Patient Reported  Weight (Patient Reported): 72.6 kg (160 lb)  Height (Patient Reported): 168.9 cm (5' 6.5\")  BMI (Based on Pt Reported Ht/Wt): 25.44  Pain Score: No Pain (1)    Izabella Trujillo, [facility]    Palliative Care Outpatient Clinic    (This note was transcribed using voice recognition software. While I review and edit the transcription, I may miss errors, and the software sometimes does unexpected capitalizations and formatting that I miss. Please let me know of any serious mistranscriptions and I will addend this note.)    Patient ID:  Medical - April 2021 dx with malignant sarcomatoid mesothelioma of L lung/pleura; tumor has high PDL1 expression and he started pembro 6/2021.    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 5 kids, many grandkids. Owns a golf course.    Care Planning - discussed 6/21 palliative visit, knows cancer is incurable and goals of his cancer tx.       History:  History gathered today from: patient, family/loved ones, medical chart    Last visit with rec'd scheduled APAP, celecoxib, and prn hydromorphone due to prior dysphoria from oxycodone.  He called us 6/23, pain no better,had not taken any hydromorphone which we recommended " he at least try.  He did try 1mg dilaudid once and it didn't help and he stopped it.  But then his pain has mostly improved ~spontaneously since then. It even went away entirely for a few days then came back but more modestly earlier this week.  He also uses Guerrero Reich liberally & that seems to really help.  He had facial pain after taking tylenol and has stopped that.  He continues on 100 mg celecoxib bid without issue.  Started taking 200 mg ibuprofen daily too ?after ENT procedure.    He has low energy, apathy, diarrhea the last couple weeks, brain fog. Nausea, low appetite; worse after pembro he had a few weeks ago.Takes omeprazole.      7/1 he had the L vocal fold augmentation and is now is ST. His speech is improving somewhat.   Has increased cough & was Rx'd codeine cough syrup but has not taken it due to concern with dilaudid.          PE: There were no vitals taken for this visit.   Wt Readings from Last 3 Encounters:   07/01/21 73 kg (161 lb)   06/23/21 73.4 kg (161 lb 12.8 oz)   06/21/21 74.7 kg (164 lb 11.2 oz)     Alert NAD  Clear sensorium      Data reviewed:  I reviewed recent labs and imaging, my comments:  Cr 0.7  CT 6/21  FINDINGS: An anterior mediastinal mass on axial image 23 is larger. It  measures 4.7 cm compared to 4.2 cm on the prior study. Another  lobulated mass and/or adenopathy in the AP window is larger. It  measures 3.5 cm compared to 2.7 cm previously. Another nodule body in  the anterior pericardial margin on axial image 32 is larger at 1.4 cm  compared to 1.1 cm on the prior study. A pleural-based lesion in the  left hemithorax on axial image 24 is larger at 3.2 cm compared to 2.6  cm previously. A left pleural effusion is present, modestly larger  compared to the prior study. A left-sided subdiaphragmatic mass on  axial image 48 is larger at 6.5 cm compared to 5 cm on the prior  study. Diaphragmatic invasion is again suspected. A few subcentimeter  liver lesions are stable.                                                                       IMPRESSION:  Neoplastic disease in the chest and left upper quadrant  of the abdomen is worse.     database reviewed: y      Impression & Recommendations:  70 yo man with a metastatic sarcomatoid mesothelioma on immunotherapy    Pain--improving spontaneously ?immunotherapy working--I d/w him I don't know that for sure but it is a hopeful sign  Increase celecoxib to 200 mg bid  No more ibuprofen  He's stopped APAP & is not taking hydromorphone currently    Cough--  Let him know it's ok to take the cough syrup ENT ordered, given he is not taking dilaudid at all. We discussed it is constipating.     Diarrhea--improving now but seems to be from pembro, temporally? He sees his onc team on Monday and I instructed him he needs to tell them he had diarrhea     Nausea--Rx'd prochlorperazine.  He has had an unusual amount of GI problems after his first dose of pembrolizumab.     45 minutes spent on the date of the encounter doing chart review, history and exam, patient education & counseling, documentation and other activities as noted above.    Thank you for involving us in the patient's care.   Sal Handy MD / Palliative Medicine / Text me via [employer].      Originating Location (pt. Location): Home    Distant Location (provider location):  [facility]     Platform used for Video Visit: Thomas        Again, thank you for allowing me to participate in the care of your patient.      Sincerely,    Sal Handy MD

## 2021-07-12 ENCOUNTER — APPOINTMENT (OUTPATIENT)
Dept: LAB | Facility: CLINIC | Age: 71
End: 2021-07-12
Attending: INTERNAL MEDICINE
Payer: MEDICARE

## 2021-07-12 ENCOUNTER — VIRTUAL VISIT (OUTPATIENT)
Dept: ONCOLOGY | Facility: CLINIC | Age: 71
End: 2021-07-12
Attending: INTERNAL MEDICINE
Payer: MEDICARE

## 2021-07-12 ENCOUNTER — INFUSION THERAPY VISIT (OUTPATIENT)
Dept: INFUSION THERAPY | Facility: CLINIC | Age: 71
End: 2021-07-12
Attending: INTERNAL MEDICINE
Payer: MEDICARE

## 2021-07-12 VITALS
DIASTOLIC BLOOD PRESSURE: 85 MMHG | WEIGHT: 161 LBS | RESPIRATION RATE: 16 BRPM | TEMPERATURE: 97.7 F | SYSTOLIC BLOOD PRESSURE: 143 MMHG | HEART RATE: 71 BPM | BODY MASS INDEX: 25.6 KG/M2

## 2021-07-12 DIAGNOSIS — C49.9 SPINDLE CELL SARCOMA (H): Primary | ICD-10-CM

## 2021-07-12 DIAGNOSIS — C45.9 MESOTHELIOMA, MALIGNANT (H): ICD-10-CM

## 2021-07-12 DIAGNOSIS — C49.9 SPINDLE CELL SARCOMA (H): ICD-10-CM

## 2021-07-12 DIAGNOSIS — L71.9 ROSACEA: ICD-10-CM

## 2021-07-12 DIAGNOSIS — C45.9 MESOTHELIOMA, MALIGNANT (H): Primary | ICD-10-CM

## 2021-07-12 LAB
ALBUMIN SERPL-MCNC: 3.3 G/DL (ref 3.4–5)
ALP SERPL-CCNC: 118 U/L (ref 40–150)
ALT SERPL W P-5'-P-CCNC: 24 U/L (ref 0–70)
ANION GAP SERPL CALCULATED.3IONS-SCNC: 7 MMOL/L (ref 3–14)
AST SERPL W P-5'-P-CCNC: 16 U/L (ref 0–45)
BASOPHILS # BLD AUTO: 0.1 10E3/UL (ref 0–0.2)
BASOPHILS NFR BLD AUTO: 1 %
BILIRUB SERPL-MCNC: 0.5 MG/DL (ref 0.2–1.3)
BUN SERPL-MCNC: 11 MG/DL (ref 7–30)
CALCIUM SERPL-MCNC: 8.5 MG/DL (ref 8.5–10.1)
CHLORIDE BLD-SCNC: 108 MMOL/L (ref 94–109)
CO2 SERPL-SCNC: 27 MMOL/L (ref 20–32)
CREAT SERPL-MCNC: 0.66 MG/DL (ref 0.66–1.25)
EOSINOPHIL # BLD AUTO: 0.4 10E3/UL (ref 0–0.7)
EOSINOPHIL NFR BLD AUTO: 4 %
ERYTHROCYTE [DISTWIDTH] IN BLOOD BY AUTOMATED COUNT: 13.9 % (ref 10–15)
GFR SERPL CREATININE-BSD FRML MDRD: >90 ML/MIN/1.73M2
GLUCOSE BLD-MCNC: 144 MG/DL (ref 70–99)
HCT VFR BLD AUTO: 42.9 % (ref 40–53)
HGB BLD-MCNC: 13.8 G/DL (ref 13.3–17.7)
IMM GRANULOCYTES # BLD: 0.1 10E3/UL
IMM GRANULOCYTES NFR BLD: 1 %
LYMPHOCYTES # BLD AUTO: 1.9 10E3/UL (ref 0.8–5.3)
LYMPHOCYTES NFR BLD AUTO: 22 %
MCH RBC QN AUTO: 27.8 PG (ref 26.5–33)
MCHC RBC AUTO-ENTMCNC: 32.2 G/DL (ref 31.5–36.5)
MCV RBC AUTO: 87 FL (ref 78–100)
MONOCYTES # BLD AUTO: 1 10E3/UL (ref 0–1.3)
MONOCYTES NFR BLD AUTO: 12 %
NEUTROPHILS # BLD AUTO: 5.1 10E3/UL (ref 1.6–8.3)
NEUTROPHILS NFR BLD AUTO: 60 %
NRBC # BLD AUTO: 0 10E3/UL
NRBC BLD AUTO-RTO: 0 /100
PLATELET # BLD AUTO: 251 10E3/UL (ref 150–450)
POTASSIUM BLD-SCNC: 3.2 MMOL/L (ref 3.4–5.3)
PROT SERPL-MCNC: 6.5 G/DL (ref 6.8–8.8)
RBC # BLD AUTO: 4.96 10E6/UL (ref 4.4–5.9)
SODIUM SERPL-SCNC: 142 MMOL/L (ref 133–144)
T4 FREE SERPL-MCNC: 1.52 NG/DL (ref 0.76–1.46)
TSH SERPL DL<=0.005 MIU/L-ACNC: 0.05 MU/L (ref 0.4–4)
WBC # BLD AUTO: 8.5 10E3/UL (ref 4–11)

## 2021-07-12 PROCEDURE — 84439 ASSAY OF FREE THYROXINE: CPT | Performed by: PHYSICIAN ASSISTANT

## 2021-07-12 PROCEDURE — 250N000011 HC RX IP 250 OP 636: Performed by: PHYSICIAN ASSISTANT

## 2021-07-12 PROCEDURE — 999N001193 HC VIDEO/TELEPHONE VISIT; NO CHARGE

## 2021-07-12 PROCEDURE — 99215 OFFICE O/P EST HI 40 MIN: CPT | Mod: 95 | Performed by: PHYSICIAN ASSISTANT

## 2021-07-12 PROCEDURE — 36415 COLL VENOUS BLD VENIPUNCTURE: CPT | Performed by: PHYSICIAN ASSISTANT

## 2021-07-12 PROCEDURE — 258N000003 HC RX IP 258 OP 636: Performed by: PHYSICIAN ASSISTANT

## 2021-07-12 PROCEDURE — 85025 COMPLETE CBC W/AUTO DIFF WBC: CPT

## 2021-07-12 PROCEDURE — 84443 ASSAY THYROID STIM HORMONE: CPT | Performed by: PHYSICIAN ASSISTANT

## 2021-07-12 PROCEDURE — 80053 COMPREHEN METABOLIC PANEL: CPT | Performed by: PHYSICIAN ASSISTANT

## 2021-07-12 PROCEDURE — 96413 CHEMO IV INFUSION 1 HR: CPT

## 2021-07-12 RX ORDER — EPINEPHRINE 1 MG/ML
0.3 INJECTION, SOLUTION INTRAMUSCULAR; SUBCUTANEOUS EVERY 5 MIN PRN
Status: CANCELLED | OUTPATIENT
Start: 2021-07-12

## 2021-07-12 RX ORDER — NALOXONE HYDROCHLORIDE 0.4 MG/ML
0.2 INJECTION, SOLUTION INTRAMUSCULAR; INTRAVENOUS; SUBCUTANEOUS
Status: CANCELLED | OUTPATIENT
Start: 2021-07-12

## 2021-07-12 RX ORDER — ALBUTEROL SULFATE 90 UG/1
1-2 AEROSOL, METERED RESPIRATORY (INHALATION)
Status: CANCELLED
Start: 2021-07-12

## 2021-07-12 RX ORDER — ALBUTEROL SULFATE 5 MG/ML
2.5 SOLUTION RESPIRATORY (INHALATION)
Status: CANCELLED | OUTPATIENT
Start: 2021-07-12

## 2021-07-12 RX ORDER — METRONIDAZOLE 7.5 MG/G
GEL TOPICAL 2 TIMES DAILY
Qty: 45 G | Refills: 0 | Status: SHIPPED | OUTPATIENT
Start: 2021-07-12 | End: 2021-09-15

## 2021-07-12 RX ORDER — MEPERIDINE HYDROCHLORIDE 25 MG/ML
25 INJECTION INTRAMUSCULAR; INTRAVENOUS; SUBCUTANEOUS EVERY 30 MIN PRN
Status: CANCELLED | OUTPATIENT
Start: 2021-07-12

## 2021-07-12 RX ORDER — DIPHENHYDRAMINE HYDROCHLORIDE 50 MG/ML
50 INJECTION INTRAMUSCULAR; INTRAVENOUS
Status: CANCELLED
Start: 2021-07-12

## 2021-07-12 RX ORDER — METHYLPREDNISOLONE SODIUM SUCCINATE 125 MG/2ML
125 INJECTION, POWDER, LYOPHILIZED, FOR SOLUTION INTRAMUSCULAR; INTRAVENOUS
Status: CANCELLED
Start: 2021-07-12

## 2021-07-12 RX ORDER — HEPARIN SODIUM (PORCINE) LOCK FLUSH IV SOLN 100 UNIT/ML 100 UNIT/ML
5 SOLUTION INTRAVENOUS
Status: CANCELLED | OUTPATIENT
Start: 2021-07-12

## 2021-07-12 RX ORDER — HEPARIN SODIUM,PORCINE 10 UNIT/ML
5 VIAL (ML) INTRAVENOUS
Status: CANCELLED | OUTPATIENT
Start: 2021-07-12

## 2021-07-12 RX ORDER — LORAZEPAM 2 MG/ML
0.5 INJECTION INTRAMUSCULAR EVERY 4 HOURS PRN
Status: CANCELLED
Start: 2021-07-12

## 2021-07-12 RX ADMIN — SODIUM CHLORIDE 250 ML: 9 INJECTION, SOLUTION INTRAVENOUS at 14:05

## 2021-07-12 RX ADMIN — SODIUM CHLORIDE 200 MG: 9 INJECTION, SOLUTION INTRAVENOUS at 14:26

## 2021-07-12 NOTE — PROGRESS NOTES
Infusion Nursing Note:  Ifrah Huitron presents today for Keytruda.    Patient seen by provider today: No   present during visit today: Not Applicable.    Note: Pt has no specific concerns today.  Patient did meet criteria for an asymptomatic covid-19 PCR test in infusion today. Patient declined the covid-19 test.    Intravenous Access:  Peripheral IV placed.    Treatment Conditions:  Lab Results   Component Value Date    HGB 13.8 07/12/2021    HGB 14.4 06/21/2021     Lab Results   Component Value Date    WBC 8.5 07/12/2021    WBC 11.0 06/21/2021      Lab Results   Component Value Date    ANEU 8.0 06/21/2021     Lab Results   Component Value Date     07/12/2021     06/21/2021      Lab Results   Component Value Date     07/12/2021     06/21/2021                   Lab Results   Component Value Date    POTASSIUM 3.2 07/12/2021    POTASSIUM 3.8 06/21/2021           No results found for: MAG         Lab Results   Component Value Date    CR 0.66 07/12/2021    CR 0.76 06/21/2021                   Lab Results   Component Value Date    COTY 8.5 07/12/2021    COTY 8.9 06/21/2021                Lab Results   Component Value Date    BILITOTAL 0.5 07/12/2021    BILITOTAL 0.7 06/21/2021           Lab Results   Component Value Date    ALBUMIN 3.3 07/12/2021    ALBUMIN 3.5 06/21/2021                    Lab Results   Component Value Date    ALT 24 07/12/2021    ALT 26 06/21/2021           Lab Results   Component Value Date    AST 16 07/12/2021    AST 25 06/21/2021       Results reviewed, labs MET treatment parameters, ok to proceed with treatment.      Post Infusion Assessment:  Patient tolerated infusion without incident.  Blood return noted pre and post infusion.  Site patent and intact, free from redness, edema or discomfort.  No evidence of extravasations.  Access discontinued per protocol.       Discharge Plan:   Discharge instructions reviewed with: Patient.  Patient and/or family verbalized  understanding of discharge instructions and all questions answered.  Patient discharged in stable condition accompanied by: self.  Departure Mode: Ambulatory.      Sarahi Astudillo RN

## 2021-07-12 NOTE — Clinical Note
7/12/2021         RE: Ifrah Huitron  [address] [place] [address]        Dear Colleague,    Thank you for referring your patient, Ifrah Huitron, to the [facility] CANCER [facility]. Please see a copy of my visit note below.    Ifrah is a 71 year old who is being evaluated via a billable video visit.      How would you like to obtain your AVS? MyChart  If the video visit is dropped, the invitation should be resent by: Text to cell phone: [phone]  Will anyone else be joining your video visit? No     Joselin DELATORRE    {If patient encounters technical issues they should call [phone] :[id number]}    Video Start Time: appx 11:35am    Video-Visit Details    Type of service:  Video Visit    Video End Time: appx 12:10am    Originating Location (pt. Location): Home    Distant Location (provider location):  [facility] CANCER [facility]     Platform used for Video Visit: [employer]    Oncology/Hematology Visit Note  Jul 12, 2021    Reason for Visit: Follow up of spindle cell sarcoma    History of Present Illness: Ifrah Huitron is a 71 year old male with PMH rosacea, pituitary macroadenoma s/p resection, GERD, kidney stones, DJD with metastatic spindle cell sarcoma. He presented to his PCP March 2021 with chest pain/left shoulder and back pain that led to imaging which revealed multiple lung mets. There were difficulties in getting diagnostic biopsy, eventually biopsy of mediastinal mass with spindle cell sarcoma. There was high PD-L1 expression (90%). Baseline imaging 6/21/21 with progression of lung mets and left-sided subdiaphragmatic mass, stable liver lesions. 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     He started Keytruda 6/21/21. He was called today for oncology follow-up.     Interval History:  Ifrah was called today for follow-up.   -He had issues with diarrhea and upset stomach 7/1-7/8. At its  worst he was having 4-5 episodes of watery diarrhea per day. Was taking Pepto Bismol for this, no imodium though he did buy it. He notes his stools were dark color but no coffee ground like stools or bleeding.   -No nausea or vomiting though did have decreased appetite when diarrhea and dyspepsia were at there worst. He was doing tums and prilosec PRN  -All GI issues are now resolved-no diarrhea, stools normal color, no abdominal discomfort  -He had issues with sore throat and cough after vocal cord procedure on 7/1. Symptoms now improving, helped with Guaifenesin AC. Hoarseness very slightly improved  -Had issues with fatigue and low energy but this is improving. Still golfing   -Notes clear improvement in his left chest/back/shoulder pain since the first infusion, doing Celebrex BID but otherwise not needing any pain medications. Sleeping well whereas previously he had to use topical numbing meds.   -His Rosacea has worsened since starting Keytruda, still doing minocycline but has redness and worsening lesions on nose. Is not using any topical agents. No other skin concerns other than dryness   -Denies fevers, headaches, dizziness, chest pain, SOB, edema. Has noted slight neuropathy in bottom of feet.     Current Outpatient Medications   Medication Sig Dispense Refill     celecoxib (CELEBREX) 200 MG capsule Take 1 capsule (200 mg) by mouth 2 times daily . Note this is a new a strength! Only one capsule at a time! 60 capsule 3     cholecalciferol (VITAMIN D-1000 MAX ST) 1000 units TABS Take 1,000 Units by mouth daily        guaiFENesin-codeine (GUAIFENESIN AC) 100-10 MG/5ML syrup Take 10 mLs by mouth every 4 hours as needed for cough 300 mL 0     hydrocortisone (CORTEF) 10 MG tablet Take 15 mg in the morning and 10 mg in the afternoon       HYDROmorphone (DILAUDID) 2 MG tablet Take 0.5-1 tablets (1-2 mg) by mouth 3 times daily as needed for severe pain 14 tablet 0     levothyroxine (SYNTHROID/LEVOTHROID) 75 MCG  tablet Take 75 mcg by mouth every morning        metroNIDAZOLE (METROGEL) 0.75 % external gel Apply topically 2 times daily 45 g 0     minocycline (MINOCIN/DYNACIN) 50 MG capsule Take 50 mg by mouth daily        omeprazole (PRILOSEC) 20 MG DR capsule Take 20 mg by mouth       prochlorperazine (COMPAZINE) 10 MG tablet Take 1 tablet (10 mg) by mouth every 6 hours as needed for nausea or vomiting 30 tablet 3     triamcinolone (KENALOG) 0.1 % external cream Apply topically 2 times daily        Physical Examination:  /88 HR 73 Temp 97.7 RR 16 Weight 161lb Pain 0/10  Wt Readings from Last 10 Encounters:   07/01/21 73 kg (161 lb)   06/23/21 73.4 kg (161 lb 12.8 oz)   06/21/21 74.7 kg (164 lb 11.2 oz)   06/21/21 71.7 kg (158 lb)   06/08/21 71.8 kg (158 lb 6.4 oz)   04/29/21 73.9 kg (163 lb)   04/05/21 73.9 kg (163 lb)   03/29/21 74.1 kg (163 lb 6.4 oz)   03/23/21 73.2 kg (161 lb 6.4 oz)   03/10/21 73.9 kg (163 lb)     Video physical exam  General: Patient appears well in no acute distress.   Skin: No visualized rash or lesions on visualized skin though skin is slightly erythematous on face   Eyes: EOMI, no erythema, sclera icterus or discharge noted  Resp: Appears to be breathing comfortably without accessory muscle usage, speaking in full sentences, no cough. Mild hoarseness  MSK: Appears to have normal range of motion based on visualized movements  Neurologic: No apparent tremors, facial movements symmetric  Psych: affect normal, alert and oriented    The rest of a comprehensive physical examination is deferred due to PHE (public health emergency) video restrictions    Laboratory Data:  Results for MARISOL XIE (MRN [id number]) as of 7/12/2021 14:14   7/12/2021 10:59   WBC 8.5   Hemoglobin 13.8   Hematocrit 42.9   Platelet Count 251   RBC Count 4.96   MCV 87   MCH 27.8   MCHC 32.2   RDW 13.9   % Neutrophils 60   % Lymphocytes 22   % Monocytes 12   % Eosinophils 4   % Basophils 1   % Immature Granulocytes 1   NRBCs  per 100 WBC 0   Absolute Neutrophils 5.1   Absolute Lymphocytes 1.9   Absolute Monocytes 1.0   Absolute Eosinophils 0.4   Absolute Basophils 0.1   Absolute Immature Granulocytes 0.1 (H)   Absolute NRBCs 0.0   Glucose 144 (H)   Sodium 142   Potassium 3.2 (L)   Chloride 108   Carbon Dioxide 27   Urea Nitrogen 11   Creatinine 0.66   GFR Estimate >90   Calcium 8.5   Anion Gap 7   Albumin 3.3 (L)   Protein Total 6.5 (L)   Bilirubin Total 0.5   ALT 24   AST 16   T4 Free 1.52 (H)   TSH 0.05 (L)   Alkaline Phosphatase 118       Assessment and Plan:  IN PROGRESS  *call next week  *derm  *omeprazole and imodium   *CT prior to cycle 4         Maynor Rios PA-C  [facility] [address] [address] [phone]        Again, thank you for allowing me to participate in the care of your patient.        Sincerely,        GERALDO Mullins

## 2021-07-12 NOTE — LETTER
7/12/2021         RE: Ifrah Huitron  [address] [facility] [address]      Ifrah is a 71 year old who is being evaluated via a billable video visit.      How would you like to obtain your AVS? MyChart  If the video visit is dropped, the invitation should be resent by: Text to cell phone: [phone]  Will anyone else be joining your video visit? Emma Tobinita NANDINI        Video Start Time: appx 11:35am    Video-Visit Details    Type of service:  Video Visit    Video End Time: appx 12:10am    Originating Location (pt. Location): Home    Distant Location (provider location):  [facility]CER [facility]     Platform used for Video Visit: [facility]    Oncology/Hematology Visit Note  Jul 12, 2021    Reason for Visit: Follow up of spindle cell sarcoma    History of Present Illness: Ifrah Huitron is a 71 year old male with PMH rosacea, pituitary macroadenoma s/p resection, GERD, kidney stones, DJD with metastatic spindle cell sarcoma. He presented to his PCP March 2021 with chest pain/left shoulder and back pain that led to imaging which revealed multiple lung mets. There were difficulties in getting diagnostic biopsy, eventually biopsy of mediastinal mass with spindle cell sarcoma. There was high PD-L1 expression (90%). Baseline imaging 6/21/21 with progression of lung mets and left-sided subdiaphragmatic mass, stable liver lesions. 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     He started Keytruda 6/21/21. He was called today for oncology follow-up.     Interval History:  Ifrah was called today for follow-up.   -He had issues with diarrhea and upset stomach 7/1-7/8. At its worst he was having 4-5 episodes of watery diarrhea per day. Was taking Pepto Bismol for this, no imodium though he did buy it. He notes his stools were dark color but no coffee ground like stools or bleeding.   -No nausea or vomiting though did  have decreased appetite when diarrhea and dyspepsia were at there worst. He was doing tums and prilosec PRN  -All GI issues are now resolved-no diarrhea, stools normal color, no abdominal discomfort  -He had issues with sore throat and cough after vocal cord procedure on 7/1. Symptoms now improving, helped with Guaifenesin AC. Hoarseness very slightly improved  -Had issues with fatigue and low energy but this is improving. Still golfing   -Notes clear improvement in his left chest/back/shoulder pain since the first infusion, doing Celebrex BID but otherwise not needing any pain medications. Sleeping well whereas previously he had to use topical numbing meds.   -His Rosacea has worsened since starting Keytruda, still doing minocycline but has redness and worsening lesions on nose. Is not using any topical agents. No other skin concerns other than dryness   -Denies fevers, headaches, dizziness, chest pain, SOB, edema. Has noted slight neuropathy in bottom of feet.     Current Outpatient Medications   Medication Sig Dispense Refill     celecoxib (CELEBREX) 200 MG capsule Take 1 capsule (200 mg) by mouth 2 times daily . Note this is a new a strength! Only one capsule at a time! 60 capsule 3     cholecalciferol (VITAMIN D-1000 MAX ST) 1000 units TABS Take 1,000 Units by mouth daily        guaiFENesin-codeine (GUAIFENESIN AC) 100-10 MG/5ML syrup Take 10 mLs by mouth every 4 hours as needed for cough 300 mL 0     hydrocortisone (CORTEF) 10 MG tablet Take 15 mg in the morning and 10 mg in the afternoon       HYDROmorphone (DILAUDID) 2 MG tablet Take 0.5-1 tablets (1-2 mg) by mouth 3 times daily as needed for severe pain 14 tablet 0     levothyroxine (SYNTHROID/LEVOTHROID) 75 MCG tablet Take 75 mcg by mouth every morning        metroNIDAZOLE (METROGEL) 0.75 % external gel Apply topically 2 times daily 45 g 0     minocycline (MINOCIN/DYNACIN) 50 MG capsule Take 50 mg by mouth daily        omeprazole (PRILOSEC) 20 MG   capsule Take 20 mg by mouth       prochlorperazine (COMPAZINE) 10 MG tablet Take 1 tablet (10 mg) by mouth every 6 hours as needed for nausea or vomiting 30 tablet 3     triamcinolone (KENALOG) 0.1 % external cream Apply topically 2 times daily        Physical Examination:  /88 HR 73 Temp 97.7 RR 16 Weight 161lb Pain 0/10  Wt Readings from Last 10 Encounters:   07/01/21 73 kg (161 lb)   06/23/21 73.4 kg (161 lb 12.8 oz)   06/21/21 74.7 kg (164 lb 11.2 oz)   06/21/21 71.7 kg (158 lb)   06/08/21 71.8 kg (158 lb 6.4 oz)   04/29/21 73.9 kg (163 lb)   04/05/21 73.9 kg (163 lb)   03/29/21 74.1 kg (163 lb 6.4 oz)   03/23/21 73.2 kg (161 lb 6.4 oz)   03/10/21 73.9 kg (163 lb)     Video physical exam  General: Patient appears well in no acute distress.   Skin: No visualized rash or lesions on visualized skin though skin is slightly erythematous on face   Eyes: EOMI, no erythema, sclera icterus or discharge noted  Resp: Appears to be breathing comfortably without accessory muscle usage, speaking in full sentences, no cough. Mild hoarseness  MSK: Appears to have normal range of motion based on visualized movements  Neurologic: No apparent tremors, facial movements symmetric  Psych: affect normal, alert and oriented    The rest of a comprehensive physical examination is deferred due to PHE (public health emergency) video restrictions    Laboratory Data:  Results for MARISOL XIE (MRN [id number]) as of 7/12/2021 14:14   7/12/2021 10:59   WBC 8.5   Hemoglobin 13.8   Hematocrit 42.9   Platelet Count 251   RBC Count 4.96   MCV 87   MCH 27.8   MCHC 32.2   RDW 13.9   % Neutrophils 60   % Lymphocytes 22   % Monocytes 12   % Eosinophils 4   % Basophils 1   % Immature Granulocytes 1   NRBCs per 100 WBC 0   Absolute Neutrophils 5.1   Absolute Lymphocytes 1.9   Absolute Monocytes 1.0   Absolute Eosinophils 0.4   Absolute Basophils 0.1   Absolute Immature Granulocytes 0.1 (H)   Absolute NRBCs 0.0   Glucose 144 (H)   Sodium 142    Potassium 3.2 (L)   Chloride 108   Carbon Dioxide 27   Urea Nitrogen 11   Creatinine 0.66   GFR Estimate >90   Calcium 8.5   Anion Gap 7   Albumin 3.3 (L)   Protein Total 6.5 (L)   Bilirubin Total 0.5   ALT 24   AST 16   T4 Free 1.52 (H)   TSH 0.05 (L)   Alkaline Phosphatase 118       Assessment and Plan:  1. 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 High PD-L1. Started on Keytruda 6/21/21. Had issues with diarrhea and dyspepsia, though unclear if directly related to infusion as started after anesthesia for vocal cord procedure? Symptoms much improved now. Clinically responding to treatment.     Reviewed labs and OK to go ahead with cycle 2 Keytruda today. See discussion of thyroid and GI issues below. Will plan to do 3 cycles and then repeat CT imaging.     Foundation one requested previously-will make sure this was done.    2. GI  Diarrhea: Possibly related to Keytruda, though unusual to have colitis issues this early on. Symptoms improved now. Discussed Imodium PRN with cycle 2 and call if symptoms worsen. No need for infectious work-up given spontaneous resolution of symptoms.    Dyspepsia: Discussed taking Omeprazole daily. Can take Tums/Pepto Bismol PRN    Nausea: Compazine PRN    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 Cal Saba endocrine. Discussed with him today-should not have any issues with hydrocortisone and will continue current dosing.    TSH low and free T4 elevated, he is on Synthroid 75mcg daily. Suspect this will resolve and go to hypothyroidism with ongoing treatment and Dr. Mccall agreed that we should not change dose at this time. Will recheck TSH and free T4 in 3 weeks. Of note, Dr. Mccall recommends monitoring free T4 instead of TSH with his history.     4. Derm  Rosacea: Long standing issue, on minocycline daily. Symptoms worse with cycle 1 Keytruda. Discussed with derm and will start Metrogel PRN and  refer to derm for ongoing assistance.    Continue lotion for dry skin.    5. MSK  Left shoulder/back/chest wall pain 2/2 tumor, following with palliative. Continue topical Bengay and PO Celebrex. Pain significantly improved after cycle 1. Continue to monitor.    6. ENT  Hoarseness: Thought 2/2 mediastinal mass vs irritation from prior biopsy. Following with ENT, s/p vocal cord infection 7/1/21. Symptoms slightly improved.    Cough 2/2 7/1/21 vocal cord procedure, improving, continue Guaifenesin-[place] PRN     60 minutes spent on the date of the encounter doing chart review, review of test results, interpretation of tests, patient visit, documentation and discussion with other provider(s) including patient's endocrinologist and dermatology.      Maynor Rios PA-C  [facility]  [address] [place] [id number]  [phone]          GERALDO Mullins

## 2021-07-19 ENCOUNTER — RESULTS ONLY (OUTPATIENT)
Dept: ONCOLOGY | Facility: CLINIC | Age: 71
End: 2021-07-19

## 2021-07-20 ENCOUNTER — TELEPHONE (OUTPATIENT)
Dept: ONCOLOGY | Facility: CLINIC | Age: 71
End: 2021-07-20

## 2021-07-20 NOTE — TELEPHONE ENCOUNTER
7/20/21    Called patient to check on GI issues after Keytruda. No diarrhea yet but stools are soft. He has imodium but hasn't needed. He has had bloating/gas despite the omeprazole. Discussed trying Gas-X (simethicone). Metrogel helping with rosacea. Does have intermittent pain issues in back, better today. He will call with any concerns before next visit.     Maynor Rios PA-C

## 2021-07-23 ENCOUNTER — HOSPITAL ENCOUNTER (OUTPATIENT)
Dept: SPEECH THERAPY | Facility: CLINIC | Age: 71
Setting detail: THERAPIES SERIES
End: 2021-07-23
Attending: OTOLARYNGOLOGY
Payer: MEDICARE

## 2021-07-23 PROCEDURE — 92507 TX SP LANG VOICE COMM INDIV: CPT | Mod: GN | Performed by: SPEECH-LANGUAGE PATHOLOGIST

## 2021-07-27 LAB — LAB SCANNED RESULT: NORMAL

## 2021-08-02 ENCOUNTER — VIRTUAL VISIT (OUTPATIENT)
Dept: ONCOLOGY | Facility: CLINIC | Age: 71
End: 2021-08-02
Attending: INTERNAL MEDICINE
Payer: MEDICARE

## 2021-08-02 ENCOUNTER — INFUSION THERAPY VISIT (OUTPATIENT)
Dept: INFUSION THERAPY | Facility: CLINIC | Age: 71
End: 2021-08-02
Attending: INTERNAL MEDICINE
Payer: MEDICARE

## 2021-08-02 ENCOUNTER — HOSPITAL ENCOUNTER (OUTPATIENT)
Dept: CARDIOLOGY | Facility: CLINIC | Age: 71
Discharge: HOME | End: 2021-08-02
Attending: PHYSICIAN ASSISTANT | Admitting: PHYSICIAN ASSISTANT
Payer: MEDICARE

## 2021-08-02 ENCOUNTER — LAB (OUTPATIENT)
Dept: LAB | Facility: CLINIC | Age: 71
End: 2021-08-02
Attending: INTERNAL MEDICINE
Payer: MEDICARE

## 2021-08-02 ENCOUNTER — HOSPITAL ENCOUNTER (OUTPATIENT)
Dept: CT IMAGING | Facility: CLINIC | Age: 71
End: 2021-08-02
Attending: PHYSICIAN ASSISTANT
Payer: MEDICARE

## 2021-08-02 VITALS
OXYGEN SATURATION: 96 % | TEMPERATURE: 98 F | RESPIRATION RATE: 16 BRPM | DIASTOLIC BLOOD PRESSURE: 86 MMHG | WEIGHT: 160.6 LBS | BODY MASS INDEX: 25.53 KG/M2 | SYSTOLIC BLOOD PRESSURE: 141 MMHG | HEART RATE: 82 BPM

## 2021-08-02 DIAGNOSIS — R06.00 DYSPNEA, UNSPECIFIED TYPE: ICD-10-CM

## 2021-08-02 DIAGNOSIS — C49.9 SPINDLE CELL SARCOMA (H): Primary | ICD-10-CM

## 2021-08-02 DIAGNOSIS — C49.9 SPINDLE CELL SARCOMA (H): ICD-10-CM

## 2021-08-02 DIAGNOSIS — C45.9 MESOTHELIOMA, MALIGNANT (H): Primary | ICD-10-CM

## 2021-08-02 DIAGNOSIS — C45.9 MESOTHELIOMA, MALIGNANT (H): ICD-10-CM

## 2021-08-02 LAB
ALBUMIN SERPL-MCNC: 3.5 G/DL (ref 3.4–5)
ALP SERPL-CCNC: 123 U/L (ref 40–150)
ALT SERPL W P-5'-P-CCNC: 36 U/L (ref 0–70)
ANION GAP SERPL CALCULATED.3IONS-SCNC: 5 MMOL/L (ref 3–14)
AST SERPL W P-5'-P-CCNC: 25 U/L (ref 0–45)
BASOPHILS # BLD AUTO: 0.1 10E3/UL (ref 0–0.2)
BASOPHILS NFR BLD AUTO: 1 %
BILIRUB SERPL-MCNC: 0.7 MG/DL (ref 0.2–1.3)
BUN SERPL-MCNC: 13 MG/DL (ref 7–30)
CALCIUM SERPL-MCNC: 9.1 MG/DL (ref 8.5–10.1)
CHLORIDE BLD-SCNC: 105 MMOL/L (ref 94–109)
CO2 SERPL-SCNC: 30 MMOL/L (ref 20–32)
CREAT SERPL-MCNC: 0.71 MG/DL (ref 0.66–1.25)
EOSINOPHIL # BLD AUTO: 0.5 10E3/UL (ref 0–0.7)
EOSINOPHIL NFR BLD AUTO: 6 %
ERYTHROCYTE [DISTWIDTH] IN BLOOD BY AUTOMATED COUNT: 14 % (ref 10–15)
GFR SERPL CREATININE-BSD FRML MDRD: >90 ML/MIN/1.73M2
GLUCOSE BLD-MCNC: 91 MG/DL (ref 70–99)
HCT VFR BLD AUTO: 45.5 % (ref 40–53)
HGB BLD-MCNC: 14.5 G/DL (ref 13.3–17.7)
IMM GRANULOCYTES # BLD: 0 10E3/UL
IMM GRANULOCYTES NFR BLD: 1 %
LYMPHOCYTES # BLD AUTO: 2.3 10E3/UL (ref 0.8–5.3)
LYMPHOCYTES NFR BLD AUTO: 26 %
MCH RBC QN AUTO: 27.5 PG (ref 26.5–33)
MCHC RBC AUTO-ENTMCNC: 31.9 G/DL (ref 31.5–36.5)
MCV RBC AUTO: 86 FL (ref 78–100)
MONOCYTES # BLD AUTO: 0.9 10E3/UL (ref 0–1.3)
MONOCYTES NFR BLD AUTO: 10 %
NEUTROPHILS # BLD AUTO: 4.8 10E3/UL (ref 1.6–8.3)
NEUTROPHILS NFR BLD AUTO: 56 %
NRBC # BLD AUTO: 0 10E3/UL
NRBC BLD AUTO-RTO: 0 /100
PLATELET # BLD AUTO: 230 10E3/UL (ref 150–450)
POTASSIUM BLD-SCNC: 3.4 MMOL/L (ref 3.4–5.3)
PROT SERPL-MCNC: 6.5 G/DL (ref 6.8–8.8)
RBC # BLD AUTO: 5.28 10E6/UL (ref 4.4–5.9)
SARS-COV-2 RNA RESP QL NAA+PROBE: NEGATIVE
SODIUM SERPL-SCNC: 140 MMOL/L (ref 133–144)
T4 FREE SERPL-MCNC: 1.34 NG/DL (ref 0.76–1.46)
TROPONIN I SERPL-MCNC: <0.015 UG/L (ref 0–0.04)
TSH SERPL DL<=0.005 MIU/L-ACNC: 0.14 MU/L (ref 0.4–4)
WBC # BLD AUTO: 8.5 10E3/UL (ref 4–11)

## 2021-08-02 PROCEDURE — 36415 COLL VENOUS BLD VENIPUNCTURE: CPT

## 2021-08-02 PROCEDURE — 84484 ASSAY OF TROPONIN QUANT: CPT | Performed by: PHYSICIAN ASSISTANT

## 2021-08-02 PROCEDURE — 999N001193 HC VIDEO/TELEPHONE VISIT; NO CHARGE

## 2021-08-02 PROCEDURE — 250N000011 HC RX IP 250 OP 636: Performed by: PHYSICIAN ASSISTANT

## 2021-08-02 PROCEDURE — 84439 ASSAY OF FREE THYROXINE: CPT | Performed by: INTERNAL MEDICINE

## 2021-08-02 PROCEDURE — 258N000003 HC RX IP 258 OP 636: Performed by: PHYSICIAN ASSISTANT

## 2021-08-02 PROCEDURE — 99215 OFFICE O/P EST HI 40 MIN: CPT | Mod: 95 | Performed by: PHYSICIAN ASSISTANT

## 2021-08-02 PROCEDURE — 36415 COLL VENOUS BLD VENIPUNCTURE: CPT | Performed by: INTERNAL MEDICINE

## 2021-08-02 PROCEDURE — 84443 ASSAY THYROID STIM HORMONE: CPT | Performed by: INTERNAL MEDICINE

## 2021-08-02 PROCEDURE — 99207 EKG 12-LEAD COMPLETE W/READ - CLINICS: CPT | Performed by: FAMILY MEDICINE

## 2021-08-02 PROCEDURE — 87635 SARS-COV-2 COVID-19 AMP PRB: CPT | Performed by: PHYSICIAN ASSISTANT

## 2021-08-02 PROCEDURE — 250N000009 HC RX 250: Performed by: RADIOLOGY

## 2021-08-02 PROCEDURE — 96413 CHEMO IV INFUSION 1 HR: CPT

## 2021-08-02 PROCEDURE — 85025 COMPLETE CBC W/AUTO DIFF WBC: CPT

## 2021-08-02 PROCEDURE — 82040 ASSAY OF SERUM ALBUMIN: CPT | Performed by: INTERNAL MEDICINE

## 2021-08-02 PROCEDURE — 82565 ASSAY OF CREATININE: CPT | Performed by: INTERNAL MEDICINE

## 2021-08-02 PROCEDURE — 250N000011 HC RX IP 250 OP 636: Performed by: RADIOLOGY

## 2021-08-02 PROCEDURE — 93005 ELECTROCARDIOGRAM TRACING: CPT

## 2021-08-02 PROCEDURE — 71275 CT ANGIOGRAPHY CHEST: CPT | Mod: ME

## 2021-08-02 RX ORDER — IOPAMIDOL 755 MG/ML
67 INJECTION, SOLUTION INTRAVASCULAR ONCE
Status: COMPLETED | OUTPATIENT
Start: 2021-08-02 | End: 2021-08-02

## 2021-08-02 RX ORDER — METHYLPREDNISOLONE SODIUM SUCCINATE 125 MG/2ML
125 INJECTION, POWDER, LYOPHILIZED, FOR SOLUTION INTRAMUSCULAR; INTRAVENOUS
Status: CANCELLED
Start: 2021-08-02

## 2021-08-02 RX ORDER — ALBUTEROL SULFATE 90 UG/1
1-2 AEROSOL, METERED RESPIRATORY (INHALATION)
Status: CANCELLED
Start: 2021-08-02

## 2021-08-02 RX ORDER — HEPARIN SODIUM,PORCINE 10 UNIT/ML
5 VIAL (ML) INTRAVENOUS
Status: CANCELLED | OUTPATIENT
Start: 2021-08-02

## 2021-08-02 RX ORDER — EPINEPHRINE 1 MG/ML
0.3 INJECTION, SOLUTION INTRAMUSCULAR; SUBCUTANEOUS EVERY 5 MIN PRN
Status: CANCELLED | OUTPATIENT
Start: 2021-08-02

## 2021-08-02 RX ORDER — NALOXONE HYDROCHLORIDE 0.4 MG/ML
0.2 INJECTION, SOLUTION INTRAMUSCULAR; INTRAVENOUS; SUBCUTANEOUS
Status: CANCELLED | OUTPATIENT
Start: 2021-08-02

## 2021-08-02 RX ORDER — HEPARIN SODIUM (PORCINE) LOCK FLUSH IV SOLN 100 UNIT/ML 100 UNIT/ML
5 SOLUTION INTRAVENOUS
Status: CANCELLED | OUTPATIENT
Start: 2021-08-02

## 2021-08-02 RX ORDER — ALBUTEROL SULFATE 0.83 MG/ML
2.5 SOLUTION RESPIRATORY (INHALATION)
Status: CANCELLED | OUTPATIENT
Start: 2021-08-02

## 2021-08-02 RX ORDER — DIPHENHYDRAMINE HYDROCHLORIDE 50 MG/ML
50 INJECTION INTRAMUSCULAR; INTRAVENOUS
Status: CANCELLED
Start: 2021-08-02

## 2021-08-02 RX ORDER — LORAZEPAM 2 MG/ML
0.5 INJECTION INTRAMUSCULAR EVERY 4 HOURS PRN
Status: CANCELLED
Start: 2021-08-02

## 2021-08-02 RX ORDER — MEPERIDINE HYDROCHLORIDE 25 MG/ML
25 INJECTION INTRAMUSCULAR; INTRAVENOUS; SUBCUTANEOUS EVERY 30 MIN PRN
Status: CANCELLED | OUTPATIENT
Start: 2021-08-02

## 2021-08-02 RX ADMIN — SODIUM CHLORIDE 98 ML: 9 INJECTION, SOLUTION INTRAVENOUS at 13:58

## 2021-08-02 RX ADMIN — SODIUM CHLORIDE 250 ML: 9 INJECTION, SOLUTION INTRAVENOUS at 16:09

## 2021-08-02 RX ADMIN — IOPAMIDOL 67 ML: 755 INJECTION, SOLUTION INTRAVENOUS at 13:58

## 2021-08-02 RX ADMIN — SODIUM CHLORIDE 200 MG: 9 INJECTION, SOLUTION INTRAVENOUS at 16:09

## 2021-08-02 NOTE — PROGRESS NOTES
Infusion Nursing Note:  Ifrah Huitron presents today for Keytruda.    Patient seen by provider today: Yes: Gabriel   present during visit today: Not Applicable.    Note: Pt had a virtual visit today when he discussed with his provider worsening SOA. Pt states the if he talk a lot he gets SOA and lightheaded and he exertional dyspnea has gotten worse. No hx of DVT or PE but pt had a CT this afternoon to r/o and PE. Pt denies CP. Mild dyspnea noted with conversation. CT reveals no PE.     1500 After speaking with Dr. Rios, and EKG, trop, and COVID test was run. If all come back negative, the plan is to proceed with Keytruda.     1535 Okay to proceed with Keytruda per Dr. Rios    Intravenous Access:  Peripheral IV placed.    Treatment Conditions:  Lab Results   Component Value Date    HGB 14.5 08/02/2021    HGB 14.4 06/21/2021     Lab Results   Component Value Date    WBC 8.5 08/02/2021    WBC 11.0 06/21/2021      Lab Results   Component Value Date    ANEU 8.0 06/21/2021     Lab Results   Component Value Date     08/02/2021     06/21/2021      Lab Results   Component Value Date     08/02/2021     06/21/2021                   Lab Results   Component Value Date    POTASSIUM 3.4 08/02/2021    POTASSIUM 3.8 06/21/2021           No results found for: MAG         Lab Results   Component Value Date    CR 0.71 08/02/2021    CR 0.76 06/21/2021                   Lab Results   Component Value Date    COTY 9.1 08/02/2021    COTY 8.9 06/21/2021                Lab Results   Component Value Date    BILITOTAL 0.7 08/02/2021    BILITOTAL 0.7 06/21/2021           Lab Results   Component Value Date    ALBUMIN 3.5 08/02/2021    ALBUMIN 3.5 06/21/2021                    Lab Results   Component Value Date    ALT 36 08/02/2021    ALT 26 06/21/2021           Lab Results   Component Value Date    AST 25 08/02/2021    AST 25 06/21/2021       Results reviewed, labs MET treatment parameters, ok  to proceed with treatment.      Post Infusion Assessment:  Patient tolerated infusion without incident.  Blood return noted pre and post infusion.  Site patent and intact, free from redness, edema or discomfort.  No evidence of extravasations.  Access discontinued per protocol.       Discharge Plan:   Patient declined prescription refills.  Patient and/or family verbalized understanding of discharge instructions and all questions answered.  Patient discharged in stable condition accompanied by: self.  Departure Mode: Ambulatory.      Sarahi Astudillo RN

## 2021-08-02 NOTE — PROGRESS NOTES
"Irfah is a 71 year old who is being evaluated via a billable video visit.      How would you like to obtain your AVS? MyChart  If the video visit is dropped, the invitation should be resent by: Text to cell phone: [phone]  Will anyone else be joining your video visit? No       I have reviewed and updated the patient's allergies and medication list.    Concerns: none  Refills: none     Vitals - Patient Reported  Weight (Patient Reported): 74.8 kg (165 lb)  Height (Patient Reported): 171.5 cm (5' 7.5\")  BMI (Based on Pt Reported Ht/Wt): 25.46  Pain Score: Mild Pain (2)  Pain Loc: Mid Back    Izabella Trujillo CMA    Video Start Time: 12:41pm     Video-Visit Details    Type of service:  Video Visit    Video End Time: 1:06pm    Originating Location (pt. Location): Home    Distant Location (provider location):  [facility]     Platform used for Video Visit: [facility]      Oncology/Hematology Visit Note  Aug 2, 2021    Reason for Visit: Follow up of spindle cell sarcoma     History of Present Illness: Ifrah Huitron is a 71 year old male with PMH rosacea, pituitary macroadenoma s/p resection, GERD, kidney stones, DJD with metastatic spindle cell sarcoma. He presented to his PCP March 2021 with chest pain/left shoulder and back pain that led to imaging which revealed multiple lung mets. There were difficulties in getting diagnostic biopsy, eventually biopsy of mediastinal mass with spindle cell sarcoma. There was high PD-L1 expression (90%). Baseline imaging 6/21/21 with progression of lung mets and left-sided subdiaphragmatic mass, stable liver lesions. 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      He started Keytruda 6/21/21. He was called today for oncology follow-up.     Interval History:  Ifrah was seen today on video for oncology follow-up.   -He notes that his cancer related left shoulder/back/chest pain is " "overall improved, though he has needed Benday PRN and takes Celebrex daily. No new areas of pain.  -He did have dyspepsia but Omeprazole seemed to help. No nausea, vomiting, abdominal pain.  -Did have diarrhea for a few days but managed with Pepto and didn't try Imodium. Did have gas pains and simethicone seemed to make worse. No blood in stools or significant cramping with diarrhea  -He has noted that he gets SOB at times-has had this for quite some time but has been worse recently. Gets winded with talking too much. No notable leg swelling or new chest pains. He continues to cough and have hoarseness.   -He denies fevers, headaches. He did feel \"off\" yesterday with dizziness and fatigue with some confusion, thinks he over did it. Felt better after rest. Has had this happen in the past.  -Denies rashes, arthralgias. Rosacea much better with metrogel     Current Outpatient Medications   Medication Sig Dispense Refill     celecoxib (CELEBREX) 200 MG capsule Take 1 capsule (200 mg) by mouth 2 times daily . Note this is a new a strength! Only one capsule at a time! 60 capsule 3     cholecalciferol (VITAMIN D-1000 MAX ST) 1000 units TABS Take 1,000 Units by mouth daily        guaiFENesin-codeine (GUAIFENESIN AC) 100-10 MG/5ML syrup Take 10 mLs by mouth every 4 hours as needed for cough 300 mL 0     hydrocortisone (CORTEF) 10 MG tablet Take 15 mg in the morning and 10 mg in the afternoon       HYDROmorphone (DILAUDID) 2 MG tablet Take 0.5-1 tablets (1-2 mg) by mouth 3 times daily as needed for severe pain 14 tablet 0     levothyroxine (SYNTHROID/LEVOTHROID) 75 MCG tablet Take 75 mcg by mouth every morning        metroNIDAZOLE (METROGEL) 0.75 % external gel Apply topically 2 times daily 45 g 0     minocycline (MINOCIN/DYNACIN) 50 MG capsule Take 50 mg by mouth daily        omeprazole (PRILOSEC) 20 MG DR capsule Take 20 mg by mouth       prochlorperazine (COMPAZINE) 10 MG tablet Take 1 tablet (10 mg) by mouth every 6 hours " as needed for nausea or vomiting 30 tablet 3     triamcinolone (KENALOG) 0.1 % external cream Apply topically 2 times daily        Physical Examination:  There were no vitals taken for this visit.  Wt Readings from Last 10 Encounters:   07/12/21 73 kg (161 lb)   07/01/21 73 kg (161 lb)   06/23/21 73.4 kg (161 lb 12.8 oz)   06/21/21 74.7 kg (164 lb 11.2 oz)   06/21/21 71.7 kg (158 lb)   06/08/21 71.8 kg (158 lb 6.4 oz)   04/29/21 73.9 kg (163 lb)   04/05/21 73.9 kg (163 lb)   03/29/21 74.1 kg (163 lb 6.4 oz)   03/23/21 73.2 kg (161 lb 6.4 oz)     Video physical exam  General: Patient appears well in no acute distress.   Skin: No visualized rash or lesions on visualized skin  Eyes: EOMI, no erythema, sclera icterus or discharge noted  Resp: Appears to be breathing comfortably without accessory muscle usage, speaking in full sentences, no cough. Did need to take a break from talking during visit 2/2 SOB  MSK: Appears to have normal range of motion based on visualized movements  Neurologic: No apparent tremors, facial movements symmetric  Psych: affect normal, alert and oriented    The rest of a comprehensive physical examination is deferred due to PHE (public health emergency) video restrictions    Laboratory Data:  Results for MARISOL XIE (MRN [id number]) as of 8/3/2021 08:31   8/2/2021 10:11   Sodium 140   Potassium 3.4   Chloride 105   Carbon Dioxide 30   Urea Nitrogen 13   Creatinine 0.71   GFR Estimate >90   Calcium 9.1   Anion Gap 5   Albumin 3.5   Protein Total 6.5 (L)   Bilirubin Total 0.7   Alkaline Phosphatase 123   ALT 36   AST 25   T4 Free 1.34   TSH 0.14 (L)   Glucose 91   WBC 8.5   Hemoglobin 14.5   Hematocrit 45.5   Platelet Count 230   RBC Count 5.28   MCV 86   MCH 27.5   MCHC 31.9   RDW 14.0   % Neutrophils 56   % Lymphocytes 26   % Monocytes 10   % Eosinophils 6   Absolute Basophils 0.1   % Basophils 1   Absolute Eosinophils 0.5   Absolute Immature Granulocytes 0.0   Absolute Lymphocytes 2.3    Absolute Monocytes 0.9   % Immature Granulocytes 1   Absolute Neutrophils 4.8   Absolute NRBCs 0.0   NRBCs per 100 WBC 0     CT PE 8/2/21  FINDINGS:  ANGIOGRAM CHEST: Pulmonary arteries are normal caliber and negative  for pulmonary emboli. Thoracic aorta is negative for dissection. No CT  evidence of right heart strain.     LUNGS AND PLEURA: Stable moderate left pleural effusion and subjacent  linear atelectasis in the left lung base. No right pleural effusion.  No consolidation. No pneumothorax. No new or enlarging pulmonary  nodules. Stable 3.3 cm pleural plaque along the left anterolateral  upper lobe (series 4, image 63).     MEDIASTINUM/AXILLAE: Decreased size mediastinal lymphadenopathy. For  example decreased size of the the anterior mediastinal mass now  measuring 3.3 x 3.3 cm (series 4, image 56), previously 4.7 x 4.4 cm  and decreased size of left aortopulmonic window lymph node now  measuring 2.9 x 1.4 cm (series 4, image 61, previously 3.5 x 2.7 cm.  No new or enlarging lymph nodes. No thoracic aortic aneurysm. Mild  coronary artery calcifications. No pericardial effusion.     UPPER ABDOMEN: Decreased size of the left subdiaphragmatic mass  measuring 6.2 x 4.5 cm, previously 7.8 x 6.2 cm using similar  measurement technique. Stable subcentimeter hypodense lesions in the  liver.     MUSCULOSKELETAL: Degenerative changes in the spine. No suspicious  lesions in the bones.                                                                      IMPRESSION:  1.  No pulmonary emboli.  2.  Stable moderate left pleural effusion and subjacent atelectasis.  No new infiltrates.  3.  Decreased burden of metastatic disease in the chest and upper  abdomen.     JAKI ADAMSON MD     EKG   Sinus rhythm  Possible old infarct    Troponin negative     COVID negative     Assessment and Plan:  1. 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 High PD-L1. Started on Keytruda 6/21/21 and  has received 2 cycles. Clinically improved. Has had issues with dyspepsia/diarrhea but better this last cycle. Worsening dyspnea, see work-up below.      Labs reviewed, TSH improving, no other concerning findings. No signs of pneumonitis on CT. Go ahead with cycle 3 today.    CT done for work-up of dyspnea does show improvement in disease. Will talk to Dr. Wolff about imaging plan given scan today shows positive response to Keytruda.      2. GI  Diarrhea: Possibly related to Keytruda, improving. Continue Pepto PRN and encouraged him to use Imodium.      Dyspepsia: Improved with Omeprazole daily. Can take Tums/Pepto Bismol PRN     Nausea: Compazine PRN     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 Cal Saba endocrine. Discussed with him previously-should not have any issues with hydrocortisone and will continue current dosing.     TSH low and free T4 elevated, he is on Synthroid 75mcg daily. TSH improving and T4 WNL. No adjustment to dose.      4. Derm  Rosacea: Long standing issue, on minocycline daily. Symptoms worse Keytruda. Continue Metrogel PRN which is helping. Has derm appointment scheduled.      5. MSK  Left shoulder/back/chest wall pain 2/2 tumor, following with palliative. Continue topical Bengay and PO Celebrex. Pain overall improved.     6. ENT  Hoarseness: Thought 2/2 mediastinal mass vs irritation from prior biopsy. Following with ENT, s/p vocal cord infection 7/1/21.     7. Pulm/Cards  WALKER: Chronic, worse the past few weeks with associated dizziness. CT PE negative for PE, no signs of immune mediated pneumonitis, no change to pleural effusion, no infection. EKG no acute findings. Troponin negative. COVID negative. Sating well on RA. Given negative work-up OK to go ahead with Keytruda today. Did ask nursing to help him arrange cards follow-up given he saw them for dyspnea in the past.    Continue Guaifenesin-Coedine PRN for cough.     45 minutes spent on the date of the  encounter doing chart review, review of test results, interpretation of tests, patient visit and discussion with other provider(s) . Documentation performed after encounter date.     Maynor Rios PA-C  [facility] [address] [address] [phone]

## 2021-08-02 NOTE — Clinical Note
"    8/2/2021         RE: Ifrah Huitron  [address] [place] [address]        Dear Colleague,    Thank you for referring your patient, Ifrah Huitron, to the [facility]. Please see a copy of my visit note below.    Ifrah is a 71 year old who is being evaluated via a billable video visit.      How would you like to obtain your AVS? MyChart  If the video visit is dropped, the invitation should be resent by: Text to cell phone: [phone]  Will anyone else be joining your video visit? No  {If patient encounters technical issues they should call [phone] :[id number]}     I have reviewed and updated the patient's allergies and medication list.    Concerns: none  Refills: none     Vitals - Patient Reported  Weight (Patient Reported): 74.8 kg (165 lb)  Height (Patient Reported): 171.5 cm (5' 7.5\")  BMI (Based on Pt Reported Ht/Wt): 25.46  Pain Score: Mild Pain (2)  Pain Loc: Mid Back    Izabella Trujillo CMA    Video Start Time: 12:41pm     Video-Visit Details    Type of service:  Video Visit    Video End Time: 1:06pm    Originating Location (pt. Location): Home    Distant Location (provider location):  [facility]     Platform used for Video Visit: [facility]      Oncology/Hematology Visit Note  Aug 2, 2021    Reason for Visit: Follow up of spindle cell sarcoma     History of Present Illness: Ifrah Huitron is a 71 year old male with PMH rosacea, pituitary macroadenoma s/p resection, GERD, kidney stones, DJD with metastatic spindle cell sarcoma. He presented to his PCP March 2021 with chest pain/left shoulder and back pain that led to imaging which revealed multiple lung mets. There were difficulties in getting diagnostic biopsy, eventually biopsy of mediastinal mass with spindle cell sarcoma. There was high PD-L1 expression (90%). Baseline imaging 6/21/21 with progression of lung mets and left-sided subdiaphragmatic mass, stable liver lesions. 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      He started Keytruda 6/21/21. He was called today for oncology follow-up.     Interval History:      Review of Systems:  Patient denies fevers, chills, night sweats, unexplained weight changes, headaches, dizziness, vision or hearing changes, new lumps or bumps, chest pain, shortness of breath, cough, abdominal pain, nausea, vomiting, changes to bowel or bladder, swelling of extremities, bleeding issues, or rash.    Current Outpatient Medications   Medication Sig Dispense Refill     celecoxib (CELEBREX) 200 MG capsule Take 1 capsule (200 mg) by mouth 2 times daily . Note this is a new a strength! Only one capsule at a time! 60 capsule 3     cholecalciferol (VITAMIN D-1000 MAX ST) 1000 units TABS Take 1,000 Units by mouth daily        guaiFENesin-codeine (GUAIFENESIN AC) 100-10 MG/5ML syrup Take 10 mLs by mouth every 4 hours as needed for cough 300 mL 0     hydrocortisone (CORTEF) 10 MG tablet Take 15 mg in the morning and 10 mg in the afternoon       HYDROmorphone (DILAUDID) 2 MG tablet Take 0.5-1 tablets (1-2 mg) by mouth 3 times daily as needed for severe pain 14 tablet 0     levothyroxine (SYNTHROID/LEVOTHROID) 75 MCG tablet Take 75 mcg by mouth every morning        metroNIDAZOLE (METROGEL) 0.75 % external gel Apply topically 2 times daily 45 g 0     minocycline (MINOCIN/DYNACIN) 50 MG capsule Take 50 mg by mouth daily        omeprazole (PRILOSEC) 20 MG DR capsule Take 20 mg by mouth       prochlorperazine (COMPAZINE) 10 MG tablet Take 1 tablet (10 mg) by mouth every 6 hours as needed for nausea or vomiting 30 tablet 3     triamcinolone (KENALOG) 0.1 % external cream Apply topically 2 times daily          Physical Examination:  There were no vitals taken for this visit.  Wt Readings from Last 10 Encounters:   07/12/21 73 kg (161 lb)   07/01/21 73 kg (161 lb)   06/23/21 73.4 kg (161 lb 12.8 oz)    06/21/21 74.7 kg (164 lb 11.2 oz)   06/21/21 71.7 kg (158 lb)   06/08/21 71.8 kg (158 lb 6.4 oz)   04/29/21 73.9 kg (163 lb)   04/05/21 73.9 kg (163 lb)   03/29/21 74.1 kg (163 lb 6.4 oz)   03/23/21 73.2 kg (161 lb 6.4 oz)     Constitutional: Well-appearing male in no acute distress.  Eyes: EOMI, PERRL. No scleral icterus.  ENT: Oral mucosa is moist without lesions or thrush.   Lymphatic: Neck is supple without cervical or supraclavicular lymphadenopathy. No axillary lymphadenopathy.  Cardiovascular: Regular rate and rhythm. No murmurs, gallops, or rubs. No peripheral edema.  Respiratory: Clear to auscultation bilaterally. No wheezes or crackles.  Gastrointestinal: Bowel sounds present. Abdomen soft, non-tender. No palpable hepatosplenomegaly or masses.   Neurologic: Cranial nerves II through XII are grossly intact.  Skin: No rashes, petechiae, or bruising noted on exposed skin.    Laboratory Data:        Assessment and Plan:          Maynor Rios PA-C  [facility]  [address] [place] [address]  [phone]          Again, thank you for allowing me to participate in the care of your patient.        Sincerely,        GERALDO Mullins

## 2021-08-02 NOTE — LETTER
"    8/2/2021         RE: Ifrah Huitron  [address]  [facility] [address]      Ifrah is a 71 year old who is being evaluated via a billable video visit.      How would you like to obtain your AVS? MyChart  If the video visit is dropped, the invitation should be resent by: Text to cell phone: [phone]  Will anyone else be joining your video visit? No       I have reviewed and updated the patient's allergies and medication list.    Concerns: none  Refills: none     Vitals - Patient Reported  Weight (Patient Reported): 74.8 kg (165 lb)  Height (Patient Reported): 171.5 cm (5' 7.5\")  BMI (Based on Pt Reported Ht/Wt): 25.46  Pain Score: Mild Pain (2)  Pain Loc: Mid Back    Izabella Trujillo CMA    Video Start Time: 12:41pm     Video-Visit Details    Type of service:  Video Visit    Video End Time: 1:06pm    Originating Location (pt. Location): Home    Distant Location (provider location):  [facility]     Platform used for Video Visit: [facility]      Oncology/Hematology Visit Note  Aug 2, 2021    Reason for Visit: Follow up of spindle cell sarcoma     History of Present Illness: Ifrah Huitron is a 71 year old male with PMH rosacea, pituitary macroadenoma s/p resection, GERD, kidney stones, DJD with metastatic spindle cell sarcoma. He presented to his PCP March 2021 with chest pain/left shoulder and back pain that led to imaging which revealed multiple lung mets. There were difficulties in getting diagnostic biopsy, eventually biopsy of mediastinal mass with spindle cell sarcoma. There was high PD-L1 expression (90%). Baseline imaging 6/21/21 with progression of lung mets and left-sided subdiaphragmatic mass, stable liver lesions. 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      He started Keytruda 6/21/21. He was called today for oncology follow-up.     Interval History:  Ifrah was seen today on video for " "oncology follow-up.   -He notes that his cancer related left shoulder/back/chest pain is overall improved, though he has needed Benday PRN and takes Celebrex daily. No new areas of pain.  -He did have dyspepsia but Omeprazole seemed to help. No nausea, vomiting, abdominal pain.  -Did have diarrhea for a few days but managed with Pepto and didn't try Imodium. Did have gas pains and simethicone seemed to make worse. No blood in stools or significant cramping with diarrhea  -He has noted that he gets SOB at times-has had this for quite some time but has been worse recently. Gets winded with talking too much. No notable leg swelling or new chest pains. He continues to cough and have hoarseness.   -He denies fevers, headaches. He did feel \"off\" yesterday with dizziness and fatigue with some confusion, thinks he over did it. Felt better after rest. Has had this happen in the past.  -Denies rashes, arthralgias. Rosacea much better with metrogel     Current Outpatient Medications   Medication Sig Dispense Refill     celecoxib (CELEBREX) 200 MG capsule Take 1 capsule (200 mg) by mouth 2 times daily . Note this is a new a strength! Only one capsule at a time! 60 capsule 3     cholecalciferol (VITAMIN D-1000 MAX ST) 1000 units TABS Take 1,000 Units by mouth daily        guaiFENesin-codeine (GUAIFENESIN AC) 100-10 MG/5ML syrup Take 10 mLs by mouth every 4 hours as needed for cough 300 mL 0     hydrocortisone (CORTEF) 10 MG tablet Take 15 mg in the morning and 10 mg in the afternoon       HYDROmorphone (DILAUDID) 2 MG tablet Take 0.5-1 tablets (1-2 mg) by mouth 3 times daily as needed for severe pain 14 tablet 0     levothyroxine (SYNTHROID/LEVOTHROID) 75 MCG tablet Take 75 mcg by mouth every morning        metroNIDAZOLE (METROGEL) 0.75 % external gel Apply topically 2 times daily 45 g 0     minocycline (MINOCIN/DYNACIN) 50 MG capsule Take 50 mg by mouth daily        omeprazole (PRILOSEC) 20 MG DR capsule Take 20 mg by mouth   "     prochlorperazine (COMPAZINE) 10 MG tablet Take 1 tablet (10 mg) by mouth every 6 hours as needed for nausea or vomiting 30 tablet 3     triamcinolone (KENALOG) 0.1 % external cream Apply topically 2 times daily        Physical Examination:  There were no vitals taken for this visit.  Wt Readings from Last 10 Encounters:   07/12/21 73 kg (161 lb)   07/01/21 73 kg (161 lb)   06/23/21 73.4 kg (161 lb 12.8 oz)   06/21/21 74.7 kg (164 lb 11.2 oz)   06/21/21 71.7 kg (158 lb)   06/08/21 71.8 kg (158 lb 6.4 oz)   04/29/21 73.9 kg (163 lb)   04/05/21 73.9 kg (163 lb)   03/29/21 74.1 kg (163 lb 6.4 oz)   03/23/21 73.2 kg (161 lb 6.4 oz)     Video physical exam  General: Patient appears well in no acute distress.   Skin: No visualized rash or lesions on visualized skin  Eyes: EOMI, no erythema, sclera icterus or discharge noted  Resp: Appears to be breathing comfortably without accessory muscle usage, speaking in full sentences, no cough. Did need to take a break from talking during visit 2/2 SOB  MSK: Appears to have normal range of motion based on visualized movements  Neurologic: No apparent tremors, facial movements symmetric  Psych: affect normal, alert and oriented    The rest of a comprehensive physical examination is deferred due to PHE (public health emergency) video restrictions    Laboratory Data:  Results for MARISOL XIE (MRN [id number]) as of 8/3/2021 08:31   8/2/2021 10:11   Sodium 140   Potassium 3.4   Chloride 105   Carbon Dioxide 30   Urea Nitrogen 13   Creatinine 0.71   GFR Estimate >90   Calcium 9.1   Anion Gap 5   Albumin 3.5   Protein Total 6.5 (L)   Bilirubin Total 0.7   Alkaline Phosphatase 123   ALT 36   AST 25   T4 Free 1.34   TSH 0.14 (L)   Glucose 91   WBC 8.5   Hemoglobin 14.5   Hematocrit 45.5   Platelet Count 230   RBC Count 5.28   MCV 86   MCH 27.5   MCHC 31.9   RDW 14.0   % Neutrophils 56   % Lymphocytes 26   % Monocytes 10   % Eosinophils 6   Absolute Basophils 0.1   % Basophils 1    Absolute Eosinophils 0.5   Absolute Immature Granulocytes 0.0   Absolute Lymphocytes 2.3   Absolute Monocytes 0.9   % Immature Granulocytes 1   Absolute Neutrophils 4.8   Absolute NRBCs 0.0   NRBCs per 100 WBC 0     CT PE 8/2/21  FINDINGS:  ANGIOGRAM CHEST: Pulmonary arteries are normal caliber and negative  for pulmonary emboli. Thoracic aorta is negative for dissection. No CT  evidence of right heart strain.     LUNGS AND PLEURA: Stable moderate left pleural effusion and subjacent  linear atelectasis in the left lung base. No right pleural effusion.  No consolidation. No pneumothorax. No new or enlarging pulmonary  nodules. Stable 3.3 cm pleural plaque along the left anterolateral  upper lobe (series 4, image 63).     MEDIASTINUM/AXILLAE: Decreased size mediastinal lymphadenopathy. For  example decreased size of the the anterior mediastinal mass now  measuring 3.3 x 3.3 cm (series 4, image 56), previously 4.7 x 4.4 cm  and decreased size of left aortopulmonic window lymph node now  measuring 2.9 x 1.4 cm (series 4, image 61, previously 3.5 x 2.7 cm.  No new or enlarging lymph nodes. No thoracic aortic aneurysm. Mild  coronary artery calcifications. No pericardial effusion.     UPPER ABDOMEN: Decreased size of the left subdiaphragmatic mass  measuring 6.2 x 4.5 cm, previously 7.8 x 6.2 cm using similar  measurement technique. Stable subcentimeter hypodense lesions in the  liver.     MUSCULOSKELETAL: Degenerative changes in the spine. No suspicious  lesions in the bones.                                                                      IMPRESSION:  1.  No pulmonary emboli.  2.  Stable moderate left pleural effusion and subjacent atelectasis.  No new infiltrates.  3.  Decreased burden of metastatic disease in the chest and upper  abdomen.     JAKI ADAMSON MD     EKG   Sinus rhythm  Possible old infarct    Troponin negative     COVID negative     Assessment and Plan:  1. Onc  Metastatic spindle cell sarcoma with  lung mets, subdiaphragmatic mass, liver mets. High PD-L1. Started on Keytruda 6/21/21 and has received 2 cycles. Clinically improved. Has had issues with dyspepsia/diarrhea but better this last cycle. Worsening dyspnea, see work-up below.      Labs reviewed, TSH improving, no other concerning findings. No signs of pneumonitis on CT. Go ahead with cycle 3 today.    CT done for work-up of dyspnea does show improvement in disease. Will talk to Dr. Wolff about imaging plan given scan today shows positive response to Keytruda.      2. GI  Diarrhea: Possibly related to Keytruda, improving. Continue Pepto PRN and encouraged him to use Imodium.      Dyspepsia: Improved with Omeprazole daily. Can take Tums/Pepto Bismol PRN     Nausea: Compazine PRN     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 Cal Saba endocrine. Discussed with him previously-should not have any issues with hydrocortisone and will continue current dosing.     TSH low and free T4 elevated, he is on Synthroid 75mcg daily. TSH improving and T4 WNL. No adjustment to dose.      4. Derm  Rosacea: Long standing issue, on minocycline daily. Symptoms worse Keytruda. Continue Metrogel PRN which is helping. Has derm appointment scheduled.      5. MSK  Left shoulder/back/chest wall pain 2/2 tumor, following with palliative. Continue topical Bengay and PO Celebrex. Pain overall improved.     6. ENT  Hoarseness: Thought 2/2 mediastinal mass vs irritation from prior biopsy. Following with ENT, s/p vocal cord infection 7/1/21.     7. Pulm/Cards  WALKER: Chronic, worse the past few weeks with associated dizziness. CT PE negative for PE, no signs of immune mediated pneumonitis, no change to pleural effusion, no infection. EKG no acute findings. Troponin negative. COVID negative. Sating well on RA. Given negative work-up OK to go ahead with Keytruda today. Did ask nursing to help him arrange cards follow-up given he saw them for dyspnea in the  past.    Continue Guaifenesin-Coedine PRN for cough.     45 minutes spent on the date of the encounter doing chart review, review of test results, interpretation of tests, patient visit and discussion with other provider(s) . Documentation performed after encounter date.     Maynor Rios PA-C  [facility] [address] [address]  [phone]            GERALDO Mullins

## 2021-08-03 ENCOUNTER — PATIENT OUTREACH (OUTPATIENT)
Dept: PALLIATIVE CARE | Facility: CLINIC | Age: 71
End: 2021-08-03

## 2021-08-03 DIAGNOSIS — G43.109 MIGRAINE WITH AURA AND WITHOUT STATUS MIGRAINOSUS, NOT INTRACTABLE: Primary | ICD-10-CM

## 2021-08-03 RX ORDER — SUMATRIPTAN 50 MG/1
50 TABLET, FILM COATED ORAL
Qty: 6 TABLET | Refills: 1 | Status: ON HOLD | OUTPATIENT
Start: 2021-08-03 | End: 2021-10-26

## 2021-08-03 NOTE — PROGRESS NOTES
Received VM from patient asking for call back. He asks for something other then celebrex for a migraine.     Called him back - He says he started to have migraine symptoms last night, seeing flashing lights, light sensitvity, then woke up this morning with a full on migraine. Has had migraines in the past, used to just take excedrin or OTC medicine, but states he is not supposed to take that because of celebrex. He tells me very clearly that he is sure he has a migraine as it is just like ones he has had in the past.     Light flashes went away after a nap today, but still having headache and light sensitivity as well as vertigo. Having a little nausea, but it's controllable and he has not vomited.     Took celebrex this morning.     Says he had tests and keytruda yesterday, but hasn't gotten headaches in the past from keytruda.     He would like to know if there are any med suggestions to cut his headache down now.     Advised I would route his question to the MD on our team and the ONC MARLEEN he saw yesterday and follow up. He was okay with that plan, was going to go lay down again.

## 2021-08-03 NOTE — PROGRESS NOTES
MD recommend imitrex - 1 dose when med is picked up and okay for another dose 2 hours later if headache still present. Also okay to try tylenol PRN.     Called patient and advised of recommendation. He was grateful for the follow up and will take tylenol now and get imitrex as soon as he is able. He verbalized understanding of my instructions. Asked him to call if migraine still present tomorrow despite interventions, he is in agreement.

## 2021-08-11 ENCOUNTER — OFFICE VISIT (OUTPATIENT)
Dept: DERMATOLOGY | Facility: CLINIC | Age: 71
End: 2021-08-11
Attending: PHYSICIAN ASSISTANT
Payer: MEDICARE

## 2021-08-11 VITALS
DIASTOLIC BLOOD PRESSURE: 87 MMHG | OXYGEN SATURATION: 100 % | HEART RATE: 74 BPM | WEIGHT: 160 LBS | SYSTOLIC BLOOD PRESSURE: 145 MMHG | BODY MASS INDEX: 25.44 KG/M2

## 2021-08-11 DIAGNOSIS — L82.1 SEBORRHEIC KERATOSIS: ICD-10-CM

## 2021-08-11 DIAGNOSIS — L71.9 ROSACEA: ICD-10-CM

## 2021-08-11 DIAGNOSIS — T36.4X5A HYPERPIGMENTATION DUE TO MINOCYCLINE: Primary | ICD-10-CM

## 2021-08-11 DIAGNOSIS — L82.0 INFLAMED SEBORRHEIC KERATOSIS: ICD-10-CM

## 2021-08-11 DIAGNOSIS — L73.8 SEBACEOUS HYPERPLASIA: ICD-10-CM

## 2021-08-11 DIAGNOSIS — L81.8 HYPERPIGMENTATION DUE TO MINOCYCLINE: Primary | ICD-10-CM

## 2021-08-11 PROCEDURE — 99203 OFFICE O/P NEW LOW 30 MIN: CPT | Mod: 25 | Performed by: PHYSICIAN ASSISTANT

## 2021-08-11 PROCEDURE — 17110 DESTRUCTION B9 LES UP TO 14: CPT | Performed by: PHYSICIAN ASSISTANT

## 2021-08-11 NOTE — PROGRESS NOTES
Ifrah Huitron is an extremely pleasant 71 year old year old male patient here today for rosacea. He notes he is on minocycline for years. He had a flare of his rosacea when he started keytruda. He notes flare has resolved with metrocream. He notes bumps on face.   Patient has no other skin complaints today.  Remainder of the HPI, Meds, PMH, Allergies, FH, and SH was reviewed in chart.    Pertinent Hx:   No personal history of skin cancer.   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     Thyroid disease     removed pituitary gland       Past Surgical History:   Procedure Laterality Date     COLONOSCOPY N/A 12/17/2020    Procedure: COLONOSCOPY;  Surgeon: Ken Camacho MD;  Location: [facility]     ENT SURGERY       HERNIA REPAIR       LARYNGOSCOPY, EXCISE VOCAL CORD LESION MICROSCOPIC, COMBINED Left 7/1/2021    Procedure: MICROLARYNGOSCOPY, LEFT TRUE VOCAL CORD INJECTION WITH PROLARYN;  Surgeon: Kyree Bearden MD;  Location: [facility]     PHACOEMULSIFICATION WITH STANDARD INTRAOCULAR LENS IMPLANT Right 3/10/2021    Procedure: Cataract removal with implant.;  Surgeon: Jamir Mac MD;  Location: [facility]     PHACOEMULSIFICATION WITH STANDARD INTRAOCULAR LENS IMPLANT Left 4/5/2021    Procedure: Cataract removal with implant.;  Surgeon: Jamir Mac MD;  Location: [facility]     PITUITARY EXCISION       tooth pulled 4/7  Right         Family History   Problem Relation Age of Onset     Lupus Mother      ALS Father      Rheumatoid Arthritis Sister        Social History     Socioeconomic History     Marital status:      Spouse name: Not on file     Number of children: Not on file     Years of education: Not on file     Highest education level: Not on file   Occupational History     Not on file   Tobacco Use     Smoking status: Never Smoker     Smokeless tobacco: Never Used   Substance and Sexual Activity     Alcohol use:  Yes     Comment: rare     Drug use: Never     Sexual activity: Not on file   Other Topics Concern     Parent/sibling w/ CABG, MI or angioplasty before 65F 55M? Not Asked   Social History Narrative     Not on file     Social Determinants of Health     Financial Resource Strain:      Difficulty of Paying Living Expenses:    Food Insecurity:      Worried About Running Out of Food in the Last Year:      Ran Out of Food in the Last Year:    Transportation Needs:      Lack of Transportation (Medical):      Lack of Transportation (Non-Medical):    Physical Activity:      Days of Exercise per Week:      Minutes of Exercise per Session:    Stress:      Feeling of Stress :    Social Connections:      Frequency of Communication with Friends and Family:      Frequency of Social Gatherings with Friends and Family:      Attends [employer] Services:      Active Member of Clubs or Organizations:      Attends Club or Organization Meetings:      Marital Status:    Intimate Partner Violence:      Fear of Current or Ex-Partner:      Emotionally Abused:      Physically Abused:      Sexually Abused:        Outpatient Encounter Medications as of 8/11/2021   Medication Sig Dispense Refill     celecoxib (CELEBREX) 200 MG capsule Take 1 capsule (200 mg) by mouth 2 times daily . Note this is a new a strength! Only one capsule at a time! 60 capsule 3     cholecalciferol (VITAMIN D-1000 MAX ST) 1000 units TABS Take 1,000 Units by mouth daily        guaiFENesin-codeine (GUAIFENESIN AC) 100-10 MG/5ML syrup Take 10 mLs by mouth every 4 hours as needed for cough 300 mL 0     hydrocortisone (CORTEF) 10 MG tablet Take 15 mg in the morning and 10 mg in the afternoon       HYDROmorphone (DILAUDID) 2 MG tablet Take 0.5-1 tablets (1-2 mg) by mouth 3 times daily as needed for severe pain 14 tablet 0     levothyroxine (SYNTHROID/LEVOTHROID) 75 MCG tablet Take 75 mcg by mouth every morning        metroNIDAZOLE (METROGEL) 0.75 % external gel Apply topically 2  times daily 45 g 0     minocycline (MINOCIN/DYNACIN) 50 MG capsule Take 50 mg by mouth daily        omeprazole (PRILOSEC) 20 MG DR capsule Take 20 mg by mouth       prochlorperazine (COMPAZINE) 10 MG tablet Take 1 tablet (10 mg) by mouth every 6 hours as needed for nausea or vomiting 30 tablet 3     SUMAtriptan (IMITREX) 50 MG tablet Take 1 tablet (50 mg) by mouth at onset of headache for migraine May repeat in 2 hours. Max 4 tablets/24 hours. 6 tablet 1     triamcinolone (KENALOG) 0.1 % external cream Apply topically 2 times daily        No facility-administered encounter medications on file as of 8/11/2021.             O:   NAD, WDWN, Alert & Oriented, Mood & Affect wnl, Vitals stable   Here today alone   BP (!) 145/87 (BP Location: Right arm, Patient Position: Sitting, Cuff Size: Adult Regular)   Pulse 74   Wt 72.6 kg (160 lb)   SpO2 100%   BMI 25.44 kg/m     General appearance normal   Vitals stable   Alert, oriented and in no acute distress     Yellow lobulated papules on face  Brown stuck on papules on face  Grayish blue discoloration on lower legs  No visible inflammatory papules on face     Eyes: Conjunctivae/lids:Normal     ENT: Lips: normal    MSK:Normal    Cardiovascular: peripheral edema none    Pulm: Breathing Normal    Neuro/Psych: Orientation:Alert and Orientedx3 ; Mood/Affect:normal     A/P:  1. ISK on right zygoma x 1  LN2:  Treated with LN2 for 5s for 1-2 cycles. Warned risks of blistering, pain, pigment change, scarring, and incomplete resolution.  Advised patient to return if lesions do not completely resolve.  Wound care sheet given.  2. Rosacea with minocycline hyperpigmentation.  Apply metrocream twice daily to face.   Stop minocycline, causing bluish hyperpigmentation on legs  3. Seborrheic keratosis, sebaceous hyperplasia   BENIGN LESIONS DISCUSSED WITH PATIENT:  I discussed the specifics of tumor, prognosis, and genetics of benign lesions.  I explained that treatment of these lesions  would be purely cosmetic and not medically neccessary.  I discussed with patient different removal options including excision, cautery and /or laser.      Nature and genetics of benign skin lesions dicussed with patient.  Signs and Symptoms of skin cancer discussed with patient.  ABCDEs of melanoma reviewed with patient.  Patient encouraged to perform monthly skin exams.  UV precautions reviewed with patient.  Risks of non-melanoma skin cancer discussed with patient   Return to clinic as needed.

## 2021-08-11 NOTE — LETTER
8/11/2021         RE: Ifrah Huitron  [address] [place] [address]        Dear Colleague,    Thank you for referring your patient, Ifrah Huitron, to the [facility]. Please see a copy of my visit note below.    Ifrah Huitron is an extremely pleasant 71 year old year old male patient here today for rosacea. He notes he is on minocycline for years. He had a flare of his rosacea when he started keytruda. He notes flare has resolved with metrocream. He notes bumps on face.   Patient has no other skin complaints today.  Remainder of the HPI, Meds, PMH, Allergies, FH, and SH was reviewed in chart.    Pertinent Hx:   No personal history of skin cancer.   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     Thyroid disease     removed pituitary gland       Past Surgical History:   Procedure Laterality Date     COLONOSCOPY N/A 12/17/2020    Procedure: COLONOSCOPY;  Surgeon: Ken Camacho MD;  Location: [place] [facility]     ENT SURGERY       HERNIA REPAIR       LARYNGOSCOPY, EXCISE VOCAL CORD LESION MICROSCOPIC, COMBINED Left 7/1/2021    Procedure: MICROLARYNGOSCOPY, LEFT TRUE VOCAL CORD INJECTION WITH PROLARYN;  Surgeon: Kyree Bearden MD;  Location: [place] [facility]     PHACOEMULSIFICATION WITH STANDARD INTRAOCULAR LENS IMPLANT Right 3/10/2021    Procedure: Cataract removal with implant.;  Surgeon: Jamir Mac MD;  Location: [facility]     PHACOEMULSIFICATION WITH STANDARD INTRAOCULAR LENS IMPLANT Left 4/5/2021    Procedure: Cataract removal with implant.;  Surgeon: Jamir Mac MD;  Location: [place] [facility]     PITUITARY EXCISION       tooth pulled 4/7  Right         Family History   Problem Relation Age of Onset     Lupus Mother      ALS Father      Rheumatoid Arthritis Sister        Social History     Socioeconomic History     Marital status:      Spouse name: Not on file     Number of children: Not on file      Years of education: Not on file     Highest education level: Not on file   Occupational History     Not on file   Tobacco Use     Smoking status: Never Smoker     Smokeless tobacco: Never Used   Substance and Sexual Activity     Alcohol use: Yes     Comment: rare     Drug use: Never     Sexual activity: Not on file   Other Topics Concern     Parent/sibling w/ CABG, MI or angioplasty before 65F 55M? Not Asked   Social History Narrative     Not on file     Social Determinants of Health     Financial Resource Strain:      Difficulty of Paying Living Expenses:    Food Insecurity:      Worried About Running Out of Food in the Last Year:      Ran Out of Food in the Last Year:    Transportation Needs:      Lack of Transportation (Medical):      Lack of Transportation (Non-Medical):    Physical Activity:      Days of Exercise per Week:      Minutes of Exercise per Session:    Stress:      Feeling of Stress :    Social Connections:      Frequency of Communication with Friends and Family:      Frequency of Social Gatherings with Friends and Family:      Attends [employer] Services:      Active Member of Clubs or Organizations:      Attends Club or Organization Meetings:      Marital Status:    Intimate Partner Violence:      Fear of Current or Ex-Partner:      Emotionally Abused:      Physically Abused:      Sexually Abused:        Outpatient Encounter Medications as of 8/11/2021   Medication Sig Dispense Refill     celecoxib (CELEBREX) 200 MG capsule Take 1 capsule (200 mg) by mouth 2 times daily . Note this is a new a strength! Only one capsule at a time! 60 capsule 3     cholecalciferol (VITAMIN D-1000 MAX ST) 1000 units TABS Take 1,000 Units by mouth daily        guaiFENesin-codeine (GUAIFENESIN AC) 100-10 MG/5ML syrup Take 10 mLs by mouth every 4 hours as needed for cough 300 mL 0     hydrocortisone (CORTEF) 10 MG tablet Take 15 mg in the morning and 10 mg in the afternoon       HYDROmorphone (DILAUDID) 2 MG tablet  Take 0.5-1 tablets (1-2 mg) by mouth 3 times daily as needed for severe pain 14 tablet 0     levothyroxine (SYNTHROID/LEVOTHROID) 75 MCG tablet Take 75 mcg by mouth every morning        metroNIDAZOLE (METROGEL) 0.75 % external gel Apply topically 2 times daily 45 g 0     minocycline (MINOCIN/DYNACIN) 50 MG capsule Take 50 mg by mouth daily        omeprazole (PRILOSEC) 20 MG DR capsule Take 20 mg by mouth       prochlorperazine (COMPAZINE) 10 MG tablet Take 1 tablet (10 mg) by mouth every 6 hours as needed for nausea or vomiting 30 tablet 3     SUMAtriptan (IMITREX) 50 MG tablet Take 1 tablet (50 mg) by mouth at onset of headache for migraine May repeat in 2 hours. Max 4 tablets/24 hours. 6 tablet 1     triamcinolone (KENALOG) 0.1 % external cream Apply topically 2 times daily        No facility-administered encounter medications on file as of 8/11/2021.             O:   NAD, WDWN, Alert & Oriented, Mood & Affect wnl, Vitals stable   Here today alone   BP (!) 145/87 (BP Location: Right arm, Patient Position: Sitting, Cuff Size: Adult Regular)   Pulse 74   Wt 72.6 kg (160 lb)   SpO2 100%   BMI 25.44 kg/m     General appearance normal   Vitals stable   Alert, oriented and in no acute distress     Yellow lobulated papules on face  Brown stuck on papules on face  Grayish blue discoloration on lower legs  No visible inflammatory papules on face     Eyes: Conjunctivae/lids:Normal     ENT: Lips: normal    MSK:Normal    Cardiovascular: peripheral edema none    Pulm: Breathing Normal    Neuro/Psych: Orientation:Alert and Orientedx3 ; Mood/Affect:normal     A/P:  1. ISK on right zygoma x 1  LN2:  Treated with LN2 for 5s for 1-2 cycles. Warned risks of blistering, pain, pigment change, scarring, and incomplete resolution.  Advised patient to return if lesions do not completely resolve.  Wound care sheet given.  2. Rosacea with minocycline hyperpigmentation.  Apply metrocream twice daily to face.   Stop minocycline, causing  bluish hyperpigmentation on legs  3. Seborrheic keratosis, sebaceous hyperplasia   BENIGN LESIONS DISCUSSED WITH PATIENT:  I discussed the specifics of tumor, prognosis, and genetics of benign lesions.  I explained that treatment of these lesions would be purely cosmetic and not medically neccessary.  I discussed with patient different removal options including excision, cautery and /or laser.      Nature and genetics of benign skin lesions dicussed with patient.  Signs and Symptoms of skin cancer discussed with patient.  ABCDEs of melanoma reviewed with patient.  Patient encouraged to perform monthly skin exams.  UV precautions reviewed with patient.  Risks of non-melanoma skin cancer discussed with patient   Return to clinic as needed.         Again, thank you for allowing me to participate in the care of your patient.        Sincerely,        Fabiola Shell PA-C

## 2021-08-11 NOTE — NURSING NOTE
"Chief Complaint   Patient presents with     Derm Problem     face     Rosacea       Vitals:    08/11/21 1050   BP: (!) 145/87   BP Location: Right arm   Patient Position: Sitting   Cuff Size: Adult Regular   Pulse: 74   SpO2: 100%   Weight: 72.6 kg (160 lb)     Wt Readings from Last 1 Encounters:   08/11/21 72.6 kg (160 lb)     Ht Readings from Last 1 Encounters:   07/01/21 1.689 m (5' 6.5\")       Melissa Rollins [facility], 8/11/2021 10:52 AM    "

## 2021-08-11 NOTE — PATIENT INSTRUCTIONS
WOUND CARE INSTRUCTIONS   FOR CRYOSURGERY   This area treated with liquid nitrogen should form a blister (areas treated may or may not blister-skin may just turn dark and slough off). You do not need to bandage the area unless a blister forms and breaks (which may be a few days). When the blister breaks, begin daily dressing changes as follows:  1) Clean and dry the area with tap water using clean Q-tip or sterile gauze pad.   2) Apply Polysporin ointment or Bacitracin ointment over entire wound. Do NOT use Neosporin ointment.   3) Cover the wound with a band-aid or sterile non-stick gauze pad and micropore paper tape.   REPEAT THESE INSTRUCTIONS AT LEAST ONCE A DAY UNTIL THE WOUND HAS COMPLETELY HEALED.   It is an old wives tale that a wound heals better when it is exposed to air and allowed to dry out. The wound will heal faster with a better cosmetic result if it is kept moist with ointment and covered with a bandage.   Do not let the wound dry out.   IMPORTANT INFORMATION ON REVERSE SIDE   Supplies Needed:   *Cotton tipped applicators (Q-tips)   *Polysporin ointment or Bacitracin ointment (NOT NEOSPORIN)   *Band-aids, or non stick gauze pads and micropore paper tape   PATIENT INFORMATION   During the healing process you will notice a number of changes. All wounds develop a small halo of redness surrounding the wound. This means healing is occurring. Severe itching with extensive redness usually indicates sensitivity to the ointment or bandage tape used to dress the wound. You should call our office if this develops.   Swelling and/or discoloration around your surgical site is common, particularly when performed around the eye.   All wounds normally drain. The larger the wound the more drainage there will be. After 7-10 days, you will notice the wound beginning to shrink and new skin will begin to grow. The wound is healed when you can see skin has formed over the entire area. A healed wound has a healthy, shiny  look to the surface and is red to dark pink in color to normalize. Wounds may take approximately 4-6 weeks to heal. Larger wounds may take 6-8 weeks. After the wound is healed you may discontinue dressing changes.   You may experience a sensation of tightness as your wound heals. This is normal and will gradually subside.   Your healed wound may be sensitive to temperature changes. This sensitivity improves with time, but if you re having a lot of discomfort, try to avoid temperature extremes.   Patients frequently experience itching after their wound appears to have healed because of the continue healing under the skin. Plain Vaseline will help relieve the itching.

## 2021-08-12 ENCOUNTER — PRE VISIT (OUTPATIENT)
Dept: OTOLARYNGOLOGY | Facility: CLINIC | Age: 71
End: 2021-08-12

## 2021-08-13 DIAGNOSIS — R06.00 DYSPNEA, UNSPECIFIED TYPE: Primary | ICD-10-CM

## 2021-08-17 ENCOUNTER — PATIENT OUTREACH (OUTPATIENT)
Dept: ONCOLOGY | Facility: CLINIC | Age: 71
End: 2021-08-17

## 2021-08-17 NOTE — PROGRESS NOTES
Called Ifrah to let him know that  has placed a referral for him to see cardiology for his shortness of breath.  He states that he is more than happy to go as his sob is preventing him from talking normally.  Called the cardiology clinic in [place] and they will call Ifrah to schedule an appointment at his convenience.

## 2021-08-18 ENCOUNTER — HOSPITAL ENCOUNTER (OUTPATIENT)
Dept: SPEECH THERAPY | Facility: CLINIC | Age: 71
Setting detail: THERAPIES SERIES
End: 2021-08-18
Attending: OTOLARYNGOLOGY
Payer: MEDICARE

## 2021-08-18 PROCEDURE — 92507 TX SP LANG VOICE COMM INDIV: CPT | Mod: GN | Performed by: SPEECH-LANGUAGE PATHOLOGIST

## 2021-08-18 NOTE — PROGRESS NOTES
Ifrah Huitron  1950    Kyree Bearden MD  [address] [place] [place] [address]   Speech Therapy Progress Note  08/18/21    Signing Clinician's Name / Credentials   Signing clinician's name /credentials Jemima Mantilla MA, CCC/SLP   Session Number   Session Number Pt seen a total of 3 visits.   Progress/Recertification   Completed Date 08/18/21   Recertification Due 08/19/21   Subjective Report   Subjective Report Pt reports coughing when trying to breathe in and coughs often.  Reports being SOB and causes him to feel light headed.  Pt reports doing SOVT ex makes him light headed and also becomes light headed when talking if beyond a few sentences.   Objective Measures   Objective Measures Objective Measure 1;Objective Measure 2;Objective Measure 3   Objective Measure 1   Objective Measure vocal hygiene/ ID vocal misuse   Details Problem remains with the cough, otherwise reporting compliance with vocal hygiene.   Objective Measure 2   Objective Measure decrease laryngeal tension   Details Pt states using stretches and massage but decreased frequency and does only when he feels the tension.  Reports feels better overall than last visit.  No longer has pain and feeling less tight.  Recommended continue daily as maintance plan.   Objective Measure 3   Objective Measure resonant tx, easy onset   Details Practiced easy onset with supplemental breaths every 3-4 words to not overuse breath during speech.  Pt states feels better- cough still present.   Voice Goal 1   Goal Identifier vocal hygiene/ ID vocal misuse   Goal Description Pt will report understanding of vocal hygiene and ID 2 areas of misuse.   Target Date 07/05/21   Date Met 07/05/21   Goal Progress MET   Voice Goal 2   Goal Identifier decrease laryngeal tension   Goal Description PT will ID 2 strategies to decrease laryngeal tension (massage, stretches) that are effective 75% of the time.   Target Date 08/04/21   Goal Progress MET for tension however may  facilitate cough at times.   Date Met 08/18/21   Voice Goal 3   Goal Identifier resonant tx, easy onset   Goal Description Pt will report vocal quality of 8/10 with use of vocal strategies.   Target Date 08/19/21   Goal Progress NOT MET. Using strategies- added supplemental breaths to decrease laryngeal tension.  Vocal quality has not changed- PMH of botex injection   Treatment Speech/Lang/Voice   Skilled Intervention Provided written and verbal information on.;Educated patient on vocal health strategies.;Facilitated respiratory, laryngeal, oral integration   Patient Response see objective information   Treatment Detail Breath support during speech in sentences, breathing strategy with controlled respirations    Progress Likely discharge from ST as cough and SOB is a large barrier to tx tasks.  Pt returns to see ENT 9/1/21   Education   Learner Patient   Readiness Acceptance   Method Explanation;Demonstration   Response Verbalizes understanding;Demonstrates understanding   Education Notes home program   Plan   Updates to plan of care will follow ENT visit note.     Total Session Time   Timed Code Treatment Minutes 0   Total Treatment Time (sum of timed and untimed services) 30

## 2021-08-23 ENCOUNTER — INFUSION THERAPY VISIT (OUTPATIENT)
Dept: INFUSION THERAPY | Facility: CLINIC | Age: 71
End: 2021-08-23
Attending: INTERNAL MEDICINE
Payer: MEDICARE

## 2021-08-23 ENCOUNTER — VIRTUAL VISIT (OUTPATIENT)
Dept: ONCOLOGY | Facility: CLINIC | Age: 71
End: 2021-08-23
Attending: INTERNAL MEDICINE
Payer: MEDICARE

## 2021-08-23 ENCOUNTER — LAB (OUTPATIENT)
Dept: LAB | Facility: CLINIC | Age: 71
End: 2021-08-23
Attending: INTERNAL MEDICINE
Payer: MEDICARE

## 2021-08-23 VITALS
SYSTOLIC BLOOD PRESSURE: 158 MMHG | RESPIRATION RATE: 18 BRPM | DIASTOLIC BLOOD PRESSURE: 83 MMHG | HEART RATE: 67 BPM | TEMPERATURE: 96.7 F

## 2021-08-23 DIAGNOSIS — C45.9 MESOTHELIOMA, MALIGNANT (H): ICD-10-CM

## 2021-08-23 DIAGNOSIS — J38.01 VOCAL FOLD PARALYSIS, LEFT: ICD-10-CM

## 2021-08-23 DIAGNOSIS — C45.9 MESOTHELIOMA, MALIGNANT (H): Primary | ICD-10-CM

## 2021-08-23 DIAGNOSIS — R05.9 COUGH: ICD-10-CM

## 2021-08-23 DIAGNOSIS — C49.9 SPINDLE CELL SARCOMA (H): ICD-10-CM

## 2021-08-23 DIAGNOSIS — R49.0 DYSPHONIA: ICD-10-CM

## 2021-08-23 DIAGNOSIS — R49.0 MUSCLE TENSION DYSPHONIA: ICD-10-CM

## 2021-08-23 DIAGNOSIS — C49.9 SPINDLE CELL SARCOMA (H): Primary | ICD-10-CM

## 2021-08-23 DIAGNOSIS — E87.6 HYPOKALEMIA: ICD-10-CM

## 2021-08-23 LAB
ALBUMIN SERPL-MCNC: 3.4 G/DL (ref 3.4–5)
ALP SERPL-CCNC: 113 U/L (ref 40–150)
ALT SERPL W P-5'-P-CCNC: 22 U/L (ref 0–70)
ANION GAP SERPL CALCULATED.3IONS-SCNC: 6 MMOL/L (ref 3–14)
AST SERPL W P-5'-P-CCNC: 17 U/L (ref 0–45)
BASOPHILS # BLD AUTO: 0.1 10E3/UL (ref 0–0.2)
BASOPHILS NFR BLD AUTO: 1 %
BILIRUB SERPL-MCNC: 0.4 MG/DL (ref 0.2–1.3)
BUN SERPL-MCNC: 16 MG/DL (ref 7–30)
CALCIUM SERPL-MCNC: 8.6 MG/DL (ref 8.5–10.1)
CHLORIDE BLD-SCNC: 109 MMOL/L (ref 94–109)
CO2 SERPL-SCNC: 27 MMOL/L (ref 20–32)
CREAT SERPL-MCNC: 0.78 MG/DL (ref 0.66–1.25)
EOSINOPHIL # BLD AUTO: 0.5 10E3/UL (ref 0–0.7)
EOSINOPHIL NFR BLD AUTO: 6 %
ERYTHROCYTE [DISTWIDTH] IN BLOOD BY AUTOMATED COUNT: 13.9 % (ref 10–15)
GFR SERPL CREATININE-BSD FRML MDRD: 90 ML/MIN/1.73M2
GLUCOSE BLD-MCNC: 115 MG/DL (ref 70–99)
HCT VFR BLD AUTO: 43.7 % (ref 40–53)
HGB BLD-MCNC: 14.3 G/DL (ref 13.3–17.7)
IMM GRANULOCYTES # BLD: 0 10E3/UL
IMM GRANULOCYTES NFR BLD: 0 %
LYMPHOCYTES # BLD AUTO: 2.3 10E3/UL (ref 0.8–5.3)
LYMPHOCYTES NFR BLD AUTO: 26 %
MCH RBC QN AUTO: 27.7 PG (ref 26.5–33)
MCHC RBC AUTO-ENTMCNC: 32.7 G/DL (ref 31.5–36.5)
MCV RBC AUTO: 85 FL (ref 78–100)
MONOCYTES # BLD AUTO: 1 10E3/UL (ref 0–1.3)
MONOCYTES NFR BLD AUTO: 12 %
NEUTROPHILS # BLD AUTO: 5 10E3/UL (ref 1.6–8.3)
NEUTROPHILS NFR BLD AUTO: 55 %
NRBC # BLD AUTO: 0 10E3/UL
NRBC BLD AUTO-RTO: 0 /100
PLATELET # BLD AUTO: 256 10E3/UL (ref 150–450)
POTASSIUM BLD-SCNC: 3.2 MMOL/L (ref 3.4–5.3)
PROT SERPL-MCNC: 6.8 G/DL (ref 6.8–8.8)
RBC # BLD AUTO: 5.16 10E6/UL (ref 4.4–5.9)
SODIUM SERPL-SCNC: 142 MMOL/L (ref 133–144)
TSH SERPL DL<=0.005 MIU/L-ACNC: 0.42 MU/L (ref 0.4–4)
WBC # BLD AUTO: 8.9 10E3/UL (ref 4–11)

## 2021-08-23 PROCEDURE — 36415 COLL VENOUS BLD VENIPUNCTURE: CPT | Performed by: INTERNAL MEDICINE

## 2021-08-23 PROCEDURE — 84443 ASSAY THYROID STIM HORMONE: CPT | Performed by: INTERNAL MEDICINE

## 2021-08-23 PROCEDURE — 99215 OFFICE O/P EST HI 40 MIN: CPT | Mod: 95 | Performed by: PHYSICIAN ASSISTANT

## 2021-08-23 PROCEDURE — 999N001193 HC VIDEO/TELEPHONE VISIT; NO CHARGE

## 2021-08-23 PROCEDURE — 82040 ASSAY OF SERUM ALBUMIN: CPT | Performed by: INTERNAL MEDICINE

## 2021-08-23 PROCEDURE — 258N000003 HC RX IP 258 OP 636: Performed by: PHYSICIAN ASSISTANT

## 2021-08-23 PROCEDURE — 85025 COMPLETE CBC W/AUTO DIFF WBC: CPT

## 2021-08-23 PROCEDURE — 96413 CHEMO IV INFUSION 1 HR: CPT

## 2021-08-23 PROCEDURE — 250N000013 HC RX MED GY IP 250 OP 250 PS 637: Performed by: INTERNAL MEDICINE

## 2021-08-23 PROCEDURE — 250N000011 HC RX IP 250 OP 636: Performed by: PHYSICIAN ASSISTANT

## 2021-08-23 RX ORDER — NALOXONE HYDROCHLORIDE 0.4 MG/ML
0.2 INJECTION, SOLUTION INTRAMUSCULAR; INTRAVENOUS; SUBCUTANEOUS
Status: CANCELLED | OUTPATIENT
Start: 2021-08-23

## 2021-08-23 RX ORDER — LORAZEPAM 2 MG/ML
0.5 INJECTION INTRAMUSCULAR EVERY 4 HOURS PRN
Status: CANCELLED
Start: 2021-08-23

## 2021-08-23 RX ORDER — ALBUTEROL SULFATE 90 UG/1
1-2 AEROSOL, METERED RESPIRATORY (INHALATION)
Status: CANCELLED
Start: 2021-08-23

## 2021-08-23 RX ORDER — CODEINE PHOSPHATE/GUAIFENESIN 10-100MG/5
10 LIQUID (ML) ORAL EVERY 4 HOURS PRN
Qty: 118 ML | Refills: 0 | Status: SHIPPED | OUTPATIENT
Start: 2021-08-23 | End: 2021-11-27

## 2021-08-23 RX ORDER — EPINEPHRINE 1 MG/ML
0.3 INJECTION, SOLUTION INTRAMUSCULAR; SUBCUTANEOUS EVERY 5 MIN PRN
Status: CANCELLED | OUTPATIENT
Start: 2021-08-23

## 2021-08-23 RX ORDER — HEPARIN SODIUM,PORCINE 10 UNIT/ML
5 VIAL (ML) INTRAVENOUS
Status: CANCELLED | OUTPATIENT
Start: 2021-08-23

## 2021-08-23 RX ORDER — DIPHENHYDRAMINE HYDROCHLORIDE 50 MG/ML
50 INJECTION INTRAMUSCULAR; INTRAVENOUS
Status: CANCELLED
Start: 2021-08-23

## 2021-08-23 RX ORDER — ALBUTEROL SULFATE 0.83 MG/ML
2.5 SOLUTION RESPIRATORY (INHALATION)
Status: CANCELLED | OUTPATIENT
Start: 2021-08-23

## 2021-08-23 RX ORDER — METHYLPREDNISOLONE SODIUM SUCCINATE 125 MG/2ML
125 INJECTION, POWDER, LYOPHILIZED, FOR SOLUTION INTRAMUSCULAR; INTRAVENOUS
Status: CANCELLED
Start: 2021-08-23

## 2021-08-23 RX ORDER — POTASSIUM CHLORIDE 1500 MG/1
40 TABLET, EXTENDED RELEASE ORAL ONCE
Status: COMPLETED | OUTPATIENT
Start: 2021-08-23 | End: 2021-08-23

## 2021-08-23 RX ORDER — MEPERIDINE HYDROCHLORIDE 25 MG/ML
25 INJECTION INTRAMUSCULAR; INTRAVENOUS; SUBCUTANEOUS EVERY 30 MIN PRN
Status: CANCELLED | OUTPATIENT
Start: 2021-08-23

## 2021-08-23 RX ORDER — HEPARIN SODIUM (PORCINE) LOCK FLUSH IV SOLN 100 UNIT/ML 100 UNIT/ML
5 SOLUTION INTRAVENOUS
Status: CANCELLED | OUTPATIENT
Start: 2021-08-23

## 2021-08-23 RX ADMIN — POTASSIUM CHLORIDE 40 MEQ: 20 TABLET, EXTENDED RELEASE ORAL at 14:27

## 2021-08-23 RX ADMIN — SODIUM CHLORIDE 200 MG: 9 INJECTION, SOLUTION INTRAVENOUS at 14:16

## 2021-08-23 RX ADMIN — SODIUM CHLORIDE 250 ML: 9 INJECTION, SOLUTION INTRAVENOUS at 14:20

## 2021-08-23 ASSESSMENT — PAIN SCALES - GENERAL: PAINLEVEL: NO PAIN (0)

## 2021-08-23 NOTE — PROGRESS NOTES
"Ifrah is a 71 year old who is being evaluated via a billable video visit.      How would you like to obtain your AVS? MyChart  If the video visit is dropped, the invitation should be resent by: Text to cell phone: [phone]  Will anyone else be joining your video visit? No      I have reviewed and updated the patient's allergies and medication list.    Concerns: No new concerns.   Refills: None needed.        Vitals - Patient Reported  Weight (Patient Reported): 72.6 kg (160 lb)  Height (Patient Reported): 170.2 cm (5' 7\")  BMI (Based on Pt Reported Ht/Wt): 25.06  Pain Score: No Pain (0)    Xochitl Pereira CMA    Video Start Time: 12:33pm    Video-Visit Details    Type of service:  Video Visit    Video End Time:12:55pm    Originating Location (pt. Location): Home    Distant Location (provider location):  [facility]     Platform used for Video Visit: [facility]    Oncology/Hematology Visit Note  Aug 23, 2021    Reason for Visit: Follow up of spindle cell sarcoma     History of Present Illness: Ifrah Huitron is a 71 year old male with PMH rosacea, pituitary macroadenoma s/p resection, GERD, kidney stones, DJD with metastatic spindle cell sarcoma. He presented to his PCP March 2021 with chest pain/left shoulder and back pain that led to imaging which revealed multiple lung mets. There were difficulties in getting diagnostic biopsy, eventually biopsy of mediastinal mass with spindle cell sarcoma. There was high PD-L1 expression (90%). Baseline imaging 6/21/21 with progression of lung mets and left-sided subdiaphragmatic mass, stable liver lesions. 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      He started Keytruda 6/21/21. CT 8/2/21 with positive response to treatment. He was called today for oncology follow-up.     Interval History:  Ifrah was called today for oncology follow-up. He is feeling OK. He continues to " have SOB with talking and his speech pathologist taught him some techniques to help with this. He gets lightheaded when the SOB happens during conversation. He denies SOB at rest. He has some WALKER but talking is by far the most bothersome. No chest pain and his cancer related left side pain continues to improve. He hasn't needed pain meds in over a week. He continues to have a cough, taking Robitussin-AC at bedtime which helps. This is unchanged. He denies any fevers. He has had a few episodes of dizziness, confusion, fatigue that he thinks may be related to low cortisol and happens when he over exerts himself. He had less issues with diarrhea this cycle, though still has some bloating and gas pains. Omeprazole and tums helpful. He denies nausea. Skin is doing well with topical metrogel.     Current Outpatient Medications   Medication Sig Dispense Refill     celecoxib (CELEBREX) 200 MG capsule Take 1 capsule (200 mg) by mouth 2 times daily . Note this is a new a strength! Only one capsule at a time! 60 capsule 3     cholecalciferol (VITAMIN D-1000 MAX ST) 1000 units TABS Take 1,000 Units by mouth daily        guaiFENesin-codeine (GUAIFENESIN AC) 100-10 MG/5ML syrup Take 10 mLs by mouth every 4 hours as needed for cough 300 mL 0     hydrocortisone (CORTEF) 10 MG tablet Take 15 mg in the morning and 10 mg in the afternoon       HYDROmorphone (DILAUDID) 2 MG tablet Take 0.5-1 tablets (1-2 mg) by mouth 3 times daily as needed for severe pain 14 tablet 0     levothyroxine (SYNTHROID/LEVOTHROID) 75 MCG tablet Take 75 mcg by mouth every morning        metroNIDAZOLE (METROGEL) 0.75 % external gel Apply topically 2 times daily 45 g 0     minocycline (MINOCIN/DYNACIN) 50 MG capsule Take 50 mg by mouth daily        omeprazole (PRILOSEC) 20 MG DR capsule Take 20 mg by mouth       prochlorperazine (COMPAZINE) 10 MG tablet Take 1 tablet (10 mg) by mouth every 6 hours as needed for nausea or vomiting 30 tablet 3     SUMAtriptan  (IMITREX) 50 MG tablet Take 1 tablet (50 mg) by mouth at onset of headache for migraine May repeat in 2 hours. Max 4 tablets/24 hours. 6 tablet 1     triamcinolone (KENALOG) 0.1 % external cream Apply topically 2 times daily        Physical Examination:  There were no vitals taken for this visit.  Wt Readings from Last 10 Encounters:   08/11/21 72.6 kg (160 lb)   08/02/21 72.8 kg (160 lb 9.6 oz)   07/12/21 73 kg (161 lb)   07/01/21 73 kg (161 lb)   06/23/21 73.4 kg (161 lb 12.8 oz)   06/21/21 74.7 kg (164 lb 11.2 oz)   06/21/21 71.7 kg (158 lb)   06/08/21 71.8 kg (158 lb 6.4 oz)   04/29/21 73.9 kg (163 lb)   04/05/21 73.9 kg (163 lb)     Video physical exam  General: Patient appears well in no acute distress.   Skin: No visualized rash or lesions on visualized skin  Eyes: EOMI, no erythema, sclera icterus or discharge noted  Resp: Appears to be breathing comfortably without accessory muscle usage, no cough. Has to stop to take breathes frequently during visit.   MSK: Appears to have normal range of motion based on visualized movements  Neurologic: No apparent tremors, facial movements symmetric  Psych: affect normal, alert and oriented    The rest of a comprehensive physical examination is deferred due to PHE (public health emergency) video restrictions    Laboratory Data:  Results for MARISOL XIE (MRN [id number]) as of 8/23/2021 14:54   8/23/2021 10:20   Sodium 142   Potassium 3.2 (L)   Chloride 109   Carbon Dioxide 27   Urea Nitrogen 16   Creatinine 0.78   GFR Estimate 90   Calcium 8.6   Anion Gap 6   Albumin 3.4   Protein Total 6.8   Bilirubin Total 0.4   Alkaline Phosphatase 113   ALT 22   AST 17   TSH 0.42   Glucose 115 (H)   WBC 8.9   Hemoglobin 14.3   Hematocrit 43.7   Platelet Count 256   RBC Count 5.16   MCV 85   MCH 27.7   MCHC 32.7   RDW 13.9   % Neutrophils 55   % Lymphocytes 26   % Monocytes 12   % Eosinophils 6   Absolute Basophils 0.1   % Basophils 1   Absolute Eosinophils 0.5   Absolute Immature  Granulocytes 0.0   Absolute Lymphocytes 2.3   Absolute Monocytes 1.0   % Immature Granulocytes 0   Absolute Neutrophils 5.0   Absolute NRBCs 0.0   NRBCs per 100 WBC 0     Assessment and Plan:  1. 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 High PD-L1. Started on Keytruda 6/21/21 and has received 3 cycles. Clinically improved. CT 8/2/21 with positive response to treatment. He is having dyspnea related to talking but have r/o pneumonitis previously.      Labs reviewed-slightly low potassium which he will add to diet and replace x 1 in infusion. Otherwise all WNL. Go ahead with cycle 4 today.    Plan to continue treatment every 3 weeks and repeat CT imaging in October.      2. GI  Diarrhea: Possibly related to Keytruda, resolved. Continue Pepto PRN.     Dyspepsia: Improved with Omeprazole daily. Can take Tums/Pepto Bismol PRN     Nausea: Compazine PRN     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 Cal Saba endocrine. Discussed with him previously-should not have any issues with hydrocortisone and will continue current dosing. Given episodes similar to low cortisol in past per patient, asked him to follow-up with his team. I will try to contact him as well.      TSH WNL, continue Synthroid 75mcg daily.      4. Derm  Rosacea: Long standing issue, now just continue Metrogel PRN which is helping.     5. MSK  Left shoulder/back/chest wall pain 2/2 tumor, following with palliative, much improved. Continue topical Bengay and PO Celebrex.     6. ENT  Hoarseness: Thought 2/2 mediastinal mass vs irritation from prior biopsy. Following with ENT, s/p vocal cord infection 7/1/21.     7. Pulm/Cards  Dyspnea with talking, prior work-up with CT PE negative for PE, infection, pneumonitis; troponin negative; COVID negative. Sating well on RA. Cards referral pending. Agree with speech pathology thoughts on laryngeal dysfunction and will message ENT about this.       Continue Guaifenesin-[place] PRN for cough. Refilled. Will follow-up with ENT next week.     60 minutes spent on the date of the encounter doing chart review, review of test results, interpretation of tests, patient visit and documentation     Maynor Rios PA-C  [facility]  [address] [place] [id number] [phone]

## 2021-08-23 NOTE — PROGRESS NOTES
Infusion Nursing Note:  Ifrah Huitron presents today for Keytruda C4D1.    Patient seen by provider today: Yes, GERALDO Monreal; therefoer assessment deferred   present during visit today: Not Applicable.    Note: N/A.    Intravenous Access:  Peripheral IV placed.    Treatment Conditions:  Lab Results   Component Value Date     08/23/2021     06/21/2021                   Lab Results   Component Value Date    POTASSIUM 3.2 08/23/2021    POTASSIUM 3.8 06/21/2021           No results found for: MAG         Lab Results   Component Value Date    CR 0.78 08/23/2021    CR 0.76 06/21/2021                   Lab Results   Component Value Date    COTY 8.6 08/23/2021    COTY 8.9 06/21/2021                Lab Results   Component Value Date    BILITOTAL 0.4 08/23/2021    BILITOTAL 0.7 06/21/2021           Lab Results   Component Value Date    ALBUMIN 3.4 08/23/2021    ALBUMIN 3.5 06/21/2021                    Lab Results   Component Value Date    ALT 22 08/23/2021    ALT 26 06/21/2021           Lab Results   Component Value Date    AST 17 08/23/2021    AST 25 06/21/2021   Results reviewed, labs MET treatment parameters, ok to proceed with treatment.  K 3.2; oral potassium replaced per protocol.    Post Infusion Assessment:  Patient tolerated injection without incident.  Blood return noted pre and post infusion.  Site patent and intact, free from redness, edema or discomfort.  No evidence of extravasations.  Access discontinued per protocol.     Discharge Plan:   Discharge instructions reviewed with: Patient.  Patient and/or family verbalized understanding of discharge instructions and all questions answered.  AVS to patient via Venn[employer].  Patient will return in 3 weeks for next appointment.   Patient discharged in stable condition accompanied by: self.  Departure Mode: Ambulatory.    Erin Lowe RN

## 2021-08-23 NOTE — LETTER
"    8/23/2021         RE: Ifrah Huitron  [address] [place] [address]        Dear Colleague,    Thank you for referring your patient, Ifrah Huitron, to the [facility]. Please see a copy of my visit note below.    Ifrah is a 71 year old who is being evaluated via a billable video visit.      How would you like to obtain your AVS? MyChart  If the video visit is dropped, the invitation should be resent by: Text to cell phone: [phone]  Will anyone else be joining your video visit? No      I have reviewed and updated the patient's allergies and medication list.    Concerns: No new concerns.   Refills: None needed.        Vitals - Patient Reported  Weight (Patient Reported): 72.6 kg (160 lb)  Height (Patient Reported): 170.2 cm (5' 7\")  BMI (Based on Pt Reported Ht/Wt): 25.06  Pain Score: No Pain (0)    Xochitl Pereira CMA    Video Start Time: 12:33pm    Video-Visit Details    Type of service:  Video Visit    Video End Time:12:55pm    Originating Location (pt. Location): Home    Distant Location (provider location):  [facility]     Platform used for Video Visit: [facility]    Oncology/Hematology Visit Note  Aug 23, 2021    Reason for Visit: Follow up of spindle cell sarcoma     History of Present Illness: Ifrah Huitron is a 71 year old male with PMH rosacea, pituitary macroadenoma s/p resection, GERD, kidney stones, DJD with metastatic spindle cell sarcoma. He presented to his PCP March 2021 with chest pain/left shoulder and back pain that led to imaging which revealed multiple lung mets. There were difficulties in getting diagnostic biopsy, eventually biopsy of mediastinal mass with spindle cell sarcoma. There was high PD-L1 expression (90%). Baseline imaging 6/21/21 with progression of lung mets and left-sided subdiaphragmatic mass, stable liver lesions. He saw ENT for hoarseness thought 2/2 left true vocal fold motion impairment from mediastinal " mass-underwent injection 7/1/21.      He started Keytruda 6/21/21. CT 8/2/21 with positive response to treatment. He was called today for oncology follow-up.     Interval History:  Ifrah was called today for oncology follow-up. He is feeling OK. He continues to have SOB with talking and his speech pathologist taught him some techniques to help with this. He gets lightheaded when the SOB happens during conversation. He denies SOB at rest. He has some WALKER but talking is by far the most bothersome. No chest pain and his cancer related left side pain continues to improve. He hasn't needed pain meds in over a week. He continues to have a cough, taking Robitussin-AC at bedtime which helps. This is unchanged. He denies any fevers. He has had a few episodes of dizziness, confusion, fatigue that he thinks may be related to low cortisol and happens when he over exerts himself. He had less issues with diarrhea this cycle, though still has some bloating and gas pains. Omeprazole and tums helpful. He denies nausea. Skin is doing well with topical metrogel.     Current Outpatient Medications   Medication Sig Dispense Refill     celecoxib (CELEBREX) 200 MG capsule Take 1 capsule (200 mg) by mouth 2 times daily . Note this is a new a strength! Only one capsule at a time! 60 capsule 3     cholecalciferol (VITAMIN D-1000 MAX ST) 1000 units TABS Take 1,000 Units by mouth daily        guaiFENesin-codeine (GUAIFENESIN AC) 100-10 MG/5ML syrup Take 10 mLs by mouth every 4 hours as needed for cough 300 mL 0     hydrocortisone (CORTEF) 10 MG tablet Take 15 mg in the morning and 10 mg in the afternoon       HYDROmorphone (DILAUDID) 2 MG tablet Take 0.5-1 tablets (1-2 mg) by mouth 3 times daily as needed for severe pain 14 tablet 0     levothyroxine (SYNTHROID/LEVOTHROID) 75 MCG tablet Take 75 mcg by mouth every morning        metroNIDAZOLE (METROGEL) 0.75 % external gel Apply topically 2 times daily 45 g 0     minocycline (MINOCIN/DYNACIN) 50  MG capsule Take 50 mg by mouth daily        omeprazole (PRILOSEC) 20 MG DR capsule Take 20 mg by mouth       prochlorperazine (COMPAZINE) 10 MG tablet Take 1 tablet (10 mg) by mouth every 6 hours as needed for nausea or vomiting 30 tablet 3     SUMAtriptan (IMITREX) 50 MG tablet Take 1 tablet (50 mg) by mouth at onset of headache for migraine May repeat in 2 hours. Max 4 tablets/24 hours. 6 tablet 1     triamcinolone (KENALOG) 0.1 % external cream Apply topically 2 times daily        Physical Examination:  There were no vitals taken for this visit.  Wt Readings from Last 10 Encounters:   08/11/21 72.6 kg (160 lb)   08/02/21 72.8 kg (160 lb 9.6 oz)   07/12/21 73 kg (161 lb)   07/01/21 73 kg (161 lb)   06/23/21 73.4 kg (161 lb 12.8 oz)   06/21/21 74.7 kg (164 lb 11.2 oz)   06/21/21 71.7 kg (158 lb)   06/08/21 71.8 kg (158 lb 6.4 oz)   04/29/21 73.9 kg (163 lb)   04/05/21 73.9 kg (163 lb)     Video physical exam  General: Patient appears well in no acute distress.   Skin: No visualized rash or lesions on visualized skin  Eyes: EOMI, no erythema, sclera icterus or discharge noted  Resp: Appears to be breathing comfortably without accessory muscle usage, no cough. Has to stop to take breathes frequently during visit.   MSK: Appears to have normal range of motion based on visualized movements  Neurologic: No apparent tremors, facial movements symmetric  Psych: affect normal, alert and oriented    The rest of a comprehensive physical examination is deferred due to PHE (public health emergency) video restrictions    Laboratory Data:  Results for MARISOL XIE (MRN [id number]) as of 8/23/2021 14:54   8/23/2021 10:20   Sodium 142   Potassium 3.2 (L)   Chloride 109   Carbon Dioxide 27   Urea Nitrogen 16   Creatinine 0.78   GFR Estimate 90   Calcium 8.6   Anion Gap 6   Albumin 3.4   Protein Total 6.8   Bilirubin Total 0.4   Alkaline Phosphatase 113   ALT 22   AST 17   TSH 0.42   Glucose 115 (H)   WBC 8.9   Hemoglobin 14.3    Hematocrit 43.7   Platelet Count 256   RBC Count 5.16   MCV 85   MCH 27.7   MCHC 32.7   RDW 13.9   % Neutrophils 55   % Lymphocytes 26   % Monocytes 12   % Eosinophils 6   Absolute Basophils 0.1   % Basophils 1   Absolute Eosinophils 0.5   Absolute Immature Granulocytes 0.0   Absolute Lymphocytes 2.3   Absolute Monocytes 1.0   % Immature Granulocytes 0   Absolute Neutrophils 5.0   Absolute NRBCs 0.0   NRBCs per 100 WBC 0     Assessment and Plan:  1. 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 High PD-L1. Started on Keytruda 6/21/21 and has received 3 cycles. Clinically improved. CT 8/2/21 with positive response to treatment. He is having dyspnea related to talking but have r/o pneumonitis previously.      Labs reviewed-slightly low potassium which he will add to diet and replace x 1 in infusion. Otherwise all WNL. Go ahead with cycle 4 today.    Plan to continue treatment every 3 weeks and repeat CT imaging in October.      2. GI  Diarrhea: Possibly related to Keytruda, resolved. Continue Pepto PRN.     Dyspepsia: Improved with Omeprazole daily. Can take Tums/Pepto Bismol PRN     Nausea: Compazine PRN     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 Cal Saba endocrine. Discussed with him previously-should not have any issues with hydrocortisone and will continue current dosing. Given episodes similar to low cortisol in past per patient, asked him to follow-up with his team. I will try to contact him as well.      TSH WNL, continue Synthroid 75mcg daily.      4. Derm  Rosacea: Long standing issue, now just continue Metrogel PRN which is helping.     5. MSK  Left shoulder/back/chest wall pain 2/2 tumor, following with palliative, much improved. Continue topical Bengay and PO Celebrex.     6. ENT  Hoarseness: Thought 2/2 mediastinal mass vs irritation from prior biopsy. Following with ENT, s/p vocal cord infection 7/1/21.     7.  Pulm/Cards  Dyspnea with talking, prior work-up with CT PE negative for PE, infection, pneumonitis; troponin negative; COVID negative. Sating well on RA. Cards referral pending. Agree with speech pathology thoughts on laryngeal dysfunction and will message ENT about this.      Continue Guaifenesin-Coedine PRN for cough. Refilled. Will follow-up with ENT next week.     60 minutes spent on the date of the encounter doing chart review, review of test results, interpretation of tests, patient visit and documentation     Maynor Rios PA-C  [facility]  [address] [place] [address]  [phone]        Again, thank you for allowing me to participate in the care of your patient.        Sincerely,        GERALDO Mullins

## 2021-08-25 ENCOUNTER — MYC MEDICAL ADVICE (OUTPATIENT)
Dept: ONCOLOGY | Facility: CLINIC | Age: 71
End: 2021-08-25

## 2021-08-26 ENCOUNTER — VIRTUAL VISIT (OUTPATIENT)
Dept: PALLIATIVE CARE | Facility: CLINIC | Age: 71
End: 2021-08-26
Attending: INTERNAL MEDICINE
Payer: MEDICARE

## 2021-08-26 DIAGNOSIS — C49.9 SPINDLE CELL SARCOMA (H): Primary | ICD-10-CM

## 2021-08-26 DIAGNOSIS — G89.3 CANCER ASSOCIATED PAIN: ICD-10-CM

## 2021-08-26 PROCEDURE — 99214 OFFICE O/P EST MOD 30 MIN: CPT | Mod: 95 | Performed by: INTERNAL MEDICINE

## 2021-08-26 NOTE — LETTER
8/26/2021       RE: Ifrah Huitron  [address] [place] [address]     Dear Colleague,    Thank you for referring your patient, Ifrah Huitron, to the [facility] at [facility]. Please see a copy of my visit note below.    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 Pembro started summer 2021.  8/2021 scan shows response to pembro.    Course complicated by dysphonia, working with SLP summer 2021.Vocal cord dysfunction, follows ENT.  Has surgical hypopituitarism    Social - Lives with wife Abida; 5 adult kids, 19 grandkids. Runs a golf course.     Care Planning - ACP discussion 6/2021 palliative visit.       History:  History gathered today from: patient, family/loved ones, medical chart    Pain is basically nonexistent.  Takes celecoxib 200 mg bid.  Tylenol prn; for HAs; takes on occasion.  Takes codeine cough syrup but no other opioids. Continues to find the cough bothersome.  Dyspnea with talking; onc ruled out pneumonitis.    Pretty minor side effects overall from pembro. Did have diarrhea with one cycle.   Sleeping ok, sometimes awakes for an hour in the middle of the night.  Takes a nap most days.  Mood and spirits holding up well. Golfs 1-2 x a week, active in garage    PE: There were no vitals taken for this visit.   Wt Readings from Last 3 Encounters:   08/11/21 72.6 kg (160 lb)   08/02/21 72.8 kg (160 lb 9.6 oz)   07/12/21 73 kg (161 lb)     Alert NAD  No cough  Voice hoarse, weak; entirely intelligible  Clear sensorium full affect      Data reviewed:  I reviewed recent labs and imaging, my comments: Cr 0.78  CT early Aug--sig response to pembro     database reviewed: y      Impression & Recommendations:  70 yo man with metastatic sarcomatoid mesothelioma which appears to be responding to pembro.    Pain doing well due to  pembro response I think.   Rec reduce celecoxib to 200 mg daily for a week, then just prn afterwards if he feels he doesn't need it. If his pain worsens doing this of course we should stay on previous effective dose of celecoxib.  Ok to continue Tylenol.  Off opioids.    Cough, dysphonia: on codeine cough syrup; working ENT, SLP    Follow-up 3 mo      34 minutes spent on the date of the encounter doing chart review, history and exam, patient education & counseling, documentation and other activities as noted above.    Thank you for involving us in the patient's care.   Sal Handy MD / Palliative Medicine / Text me via [facility].      Again, thank you for allowing me to participate in the care of your patient.      Sincerely,    Sal Handy MD

## 2021-08-26 NOTE — PROGRESS NOTES
Patient Location [place]  How would you like to obtain your AVS? MyChart  If the video visit is dropped, the invitation should be resent by: Text to cell phone: [phone]  Will anyone else be joining your video visit? 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 Pembro started summer 2021.  8/2021 scan shows response to pembro.    Course complicated by dysphonia, working with SLP summer 2021.Vocal cord dysfunction, follows ENT.  Has surgical hypopituitarism    Social - Lives with wife Abida; 5 adult kids, 19 grandkids. Runs a golf course.     Care Planning - ACP discussion 6/2021 palliative visit.       History:  History gathered today from: patient, family/loved ones, medical chart    Pain is basically nonexistent.  Takes celecoxib 200 mg bid.  Tylenol prn; for HAs; takes on occasion.  Takes codeine cough syrup but no other opioids. Continues to find the cough bothersome.  Dyspnea with talking; onc ruled out pneumonitis.    Pretty minor side effects overall from pembro. Did have diarrhea with one cycle.   Sleeping ok, sometimes awakes for an hour in the middle of the night.  Takes a nap most days.  Mood and spirits holding up well. Golfs 1-2 x a week, active in [employer]    PE: There were no vitals taken for this visit.   Wt Readings from Last 3 Encounters:   08/11/21 72.6 kg (160 lb)   08/02/21 72.8 kg (160 lb 9.6 oz)   07/12/21 73 kg (161 lb)     Alert NAD  No cough  Voice hoarse, weak; entirely intelligible  Clear sensorium full affect      Data reviewed:  I reviewed recent labs and imaging, my comments: Cr 0.78  CT early Aug--sig response to pembro     database reviewed: y      Impression & Recommendations:  70 yo man with metastatic sarcomatoid mesothelioma which appears to be responding to pembro.    Pain doing well due to pembro response I think.   Rec reduce celecoxib to 200 mg daily for a week, then  just prn afterwards if he feels he doesn't need it. If his pain worsens doing this of course we should stay on previous effective dose of celecoxib.  Ok to continue Tylenol.  Off opioids.    Cough, dysphonia: on codeine cough syrup; working ENT, SLP    Follow-up 3 mo      34 minutes spent on the date of the encounter doing chart review, history and exam, patient education & counseling, documentation and other activities as noted above.    Thank you for involving us in the patient's care.   Sal Handy MD / Palliative Medicine / Text me via [facility].      Video-Visit Details  Originating Location (pt. Location): Home  Distant Location (provider location):  [facility]  Platform used for Video Visit: [employer]

## 2021-08-31 NOTE — PROGRESS NOTES
Chief Complaint   Patient presents with     Post-op Visit     Post op MICROLARYNGOSCOPY, LEFT TRUE VOCAL CORD INJECTION WITH PROLARYN 7-1-21     History of Present Illness  Ifrah Huitron is a 71 year old male who presents today for follow-up.  The patient went to the operating room on 7/1/2021 and underwent a left true vocal fold injection with Prolaryn gel.  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  The patient returns today for follow-up.      After the injection, the patient reports no improvement in his voice.  He continues to work with speech but is not having improvement.  He denies any significant swallowing difficulty.  No hemoptysis, neck lumps/bumps/swelling, or unintentional weight loss. The patient denies any recent intubations or throat procedures.  The patient is a lifetime non-smoker.  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    Past Medical History  Patient Active Problem List   Diagnosis     Calculus of kidney     Degeneration of lumbar or lumbosacral intervertebral disc     Eczema     Epidural lipomatosis     TUYET (generalized anxiety disorder)     Hypopituitarism (H)     Hypothyroidism     Osteoarthritis of spine with radiculopathy, lumbar region     Pituitary macroadenoma (H)     Rosacea     Chronic lower back pain     Spindle cell sarcoma (H)     Mesothelioma, malignant (H)     Vocal fold paralysis, left     Dysphonia     Muscle tension dysphonia     Mediastinal lymphadenopathy     Current Medications    Current Outpatient Medications:      celecoxib (CELEBREX) 200 MG capsule, Take 1 capsule (200 mg) by mouth 2 times daily . Note this is a new a strength! Only one capsule at a time!, Disp: 60 capsule, Rfl: 3     cholecalciferol (VITAMIN D-1000 MAX ST) 1000 units TABS, Take 1,000 Units by mouth daily , Disp: , Rfl:      guaiFENesin-codeine (GUAIFENESIN AC) 100-10 MG/5ML syrup, Take 10 mLs by mouth  every 4 hours as needed for cough, Disp: 118 mL, Rfl: 0     hydrocortisone (CORTEF) 10 MG tablet, Take 15 mg in the morning and 10 mg in the afternoon, Disp: , Rfl:      levothyroxine (SYNTHROID/LEVOTHROID) 75 MCG tablet, Take 75 mcg by mouth every morning , Disp: , Rfl:      metroNIDAZOLE (METROGEL) 0.75 % external gel, Apply topically 2 times daily, Disp: 45 g, Rfl: 0     omeprazole (PRILOSEC) 20 MG DR capsule, Take 20 mg by mouth, Disp: , Rfl:      SUMAtriptan (IMITREX) 50 MG tablet, Take 1 tablet (50 mg) by mouth at onset of headache for migraine May repeat in 2 hours. Max 4 tablets/24 hours., Disp: 6 tablet, Rfl: 1     triamcinolone (KENALOG) 0.1 % external cream, Apply topically 2 times daily , Disp: , Rfl:      HYDROmorphone (DILAUDID) 2 MG tablet, Take 0.5-1 tablets (1-2 mg) by mouth 3 times daily as needed for severe pain (Patient not taking: Reported on 9/1/2021), Disp: 14 tablet, Rfl: 0     prochlorperazine (COMPAZINE) 10 MG tablet, Take 1 tablet (10 mg) by mouth every 6 hours as needed for nausea or vomiting (Patient not taking: Reported on 9/1/2021), Disp: 30 tablet, Rfl: 3    Allergies  Allergies   Allergen Reactions     Penicillins Other (See Comments) and Hives     swelling, hives         Social History  Social History     Socioeconomic History     Marital status:      Spouse name: Not on file     Number of children: Not on file     Years of education: Not on file     Highest education level: Not on file   Occupational History     Not on file   Tobacco Use     Smoking status: Never Smoker     Smokeless tobacco: Never Used   Substance and Sexual Activity     Alcohol use: Yes     Comment: rare     Drug use: Never     Sexual activity: Not on file   Other Topics Concern     Parent/sibling w/ CABG, MI or angioplasty before 65F 55M? Not Asked   Social History Narrative     Not on file     Social Determinants of Health     Financial Resource Strain:      Difficulty of Paying Living Expenses:    Food  "Insecurity:      Worried About Running Out of Food in the Last Year:      Ran Out of Food in the Last Year:    Transportation Needs:      Lack of Transportation (Medical):      Lack of Transportation (Non-Medical):    Physical Activity:      Days of Exercise per Week:      Minutes of Exercise per Session:    Stress:      Feeling of Stress :    Social Connections:      Frequency of Communication with Friends and Family:      Frequency of Social Gatherings with Friends and Family:      Attends Zoroastrianism Services:      Active Member of Clubs or Organizations:      Attends Club or Organization Meetings:      Marital Status:    Intimate Partner Violence:      Fear of Current or Ex-Partner:      Emotionally Abused:      Physically Abused:      Sexually Abused:        Family History  Family History   Problem Relation Age of Onset     Lupus Mother      ALS Father      Rheumatoid Arthritis Sister        Review of Systems  As per HPI and PMHx, otherwise 10 system review including the head and neck, constitutional, eyes, respiratory, GI, skin, neurologic, lymphatic, endocrine, and allergy systems is negative.    Physical Exam  BP (!) 155/95   Temp 96.9  F (36.1  C) (Tympanic)   Ht 1.702 m (5' 7\")   Wt 72.6 kg (160 lb)   BMI 25.06 kg/m    GENERAL: Patient is a pleasant, cooperative 71 year old male in no acute distress.  HEAD: Normocephalic, atraumatic.  Hair and scalp are normal.  EYES: Pupils are equal, round, reactive to light and accommodation.  Extraocular movements are intact.  The sclera nonicteric without injection.  The extraocular structures are normal.  EARS: Normal shape and symmetry.  No tenderness when palpating the mastoid or tragal areas bilaterally.   NOSE: Nares are patent.  Nasal mucosa is pink and moist.  Nasal septum is midline.  Negative anterior rhinoscopy.  ORAL CAVITY: Dentition is in good repair.  Mucous membranes are moist.  Tongue is mobile, protrudes to the midline.  Palate elevates " symmetrically.  No erythema or exudate.  No oral cavity or oropharyngeal masses, lesions, ulcerations, leukoplakia.  NECK: Supple, trachea is midline.  There no palpable cervical lymphadenopathy or masses bilaterally.  Palpation of the bilateral parotid and submandibular areas reveal no masses.  No thyromegaly.    NEUROLOGIC: Cranial nerves II through XII are grossly intact.  The patients voice is hoarse and raspy.  Patient does show some signs of right hemifacial spasm.  He also has some weakness in the facial nerve on the right-hand side.  The patient is House-Brackmann III/VI on the right for some brow weakness, mid facial weakness, and marginal division weakness.  Patient is House-Brackmann I/VI.  CARDIOVASCULAR: Extremities are warm and well-perfused.  No significant peripheral edema.  RESPIRATORY: Patient has nonlabored breathing without cough, wheeze, stridor.  PSYCHIATRIC: Patient is alert and oriented.  Mood and affect appear normal.  SKIN: Warm and dry.  No scalp, face, or neck lesions noted.     Procedure: Flexible Laryngoscopy  Indication: Dysphonia     To best visualize the upper airway anatomy and due to the chief complaint and HPI, I proceeded with flexible fiberoptic laryngoscopy examination.  The bilateral nasal cavities were anesthetized and decongested with a mixture of lidocaine and neosynephrine.  The bilateral nasal cavities were examined using a flexible fiberoptic laryngoscope.  There were no nasal cavity masses, polyps, or mucopurulence bilaterally.  The nasal septum is midline.  The nasopharynx had a normal appearance with normal Eustachian tube openings and fossa of Rosenmuller bilaterally.  Minimal adenoid tissue.  The base of tongue, vallecula, epiglottis, aryepiglottic folds, arytenoids, and piriform sinuses were without mass or lesion.  There is a moderate amount of interarytenoid thickening and a mild amount of erythema.  The right true vocal fold has excellent abduction adduction,  the left true vocal fold is in a fixed, paramedian position with some slight atrophy.  The bilateral true vocal folds were without nodules or masses.  There was severe supraglottic phonation/squeeze.  The visualized portions of the infraglottic and subglottic airway are unremarkable.  The scope was removed.  The patient tolerated the procedure well.                Assessment and Plan     ICD-10-CM    1. Vocal fold paralysis, left  J38.01 Otolaryngology Referral     LARYNGOSCOPY FLEX FIBEROPTIC, DIAGNOSTIC   2. Dysphonia  R49.0 Otolaryngology Referral     LARYNGOSCOPY FLEX FIBEROPTIC, DIAGNOSTIC   3. Muscle tension dysphonia  R49.0 Otolaryngology Referral     LARYNGOSCOPY FLEX FIBEROPTIC, DIAGNOSTIC   4. Mesothelioma, malignant (H)  C45.9 Otolaryngology Referral     LARYNGOSCOPY FLEX FIBEROPTIC, DIAGNOSTIC   5. Cough  R05 Otolaryngology Referral     LARYNGOSCOPY FLEX FIBEROPTIC, DIAGNOSTIC      It was my pleasure seeing Ifrah Huitron today in clinic.  Unfortunately, it looks like a lot of his injection is no longer present on the left.  He has severe muscle tension dysphonia on top of his left true vocal fold motion impairment.  I think the patient needs to go to the [facility] voice clinic.  I could do another injection, but I think that this would be not beneficial given his lack of improvement with the injection previously.  I placed a referral.    Ifrah to follow up with Primary Care provider regarding elevated blood pressure.    Kyree Bearden MD  Department of Otolaryngology-Head and Neck Surgery  [facility]

## 2021-09-01 ENCOUNTER — OFFICE VISIT (OUTPATIENT)
Dept: OTOLARYNGOLOGY | Facility: CLINIC | Age: 71
End: 2021-09-01
Payer: MEDICARE

## 2021-09-01 VITALS
DIASTOLIC BLOOD PRESSURE: 95 MMHG | BODY MASS INDEX: 25.11 KG/M2 | TEMPERATURE: 96.9 F | WEIGHT: 160 LBS | SYSTOLIC BLOOD PRESSURE: 155 MMHG | HEIGHT: 67 IN

## 2021-09-01 DIAGNOSIS — R49.0 MUSCLE TENSION DYSPHONIA: ICD-10-CM

## 2021-09-01 DIAGNOSIS — R05.9 COUGH: ICD-10-CM

## 2021-09-01 DIAGNOSIS — C45.9 MESOTHELIOMA, MALIGNANT (H): ICD-10-CM

## 2021-09-01 DIAGNOSIS — J38.01 VOCAL FOLD PARALYSIS, LEFT: Primary | ICD-10-CM

## 2021-09-01 DIAGNOSIS — R49.0 DYSPHONIA: ICD-10-CM

## 2021-09-01 PROCEDURE — 99213 OFFICE O/P EST LOW 20 MIN: CPT | Mod: 25 | Performed by: OTOLARYNGOLOGY

## 2021-09-01 PROCEDURE — 31575 DIAGNOSTIC LARYNGOSCOPY: CPT | Performed by: OTOLARYNGOLOGY

## 2021-09-01 ASSESSMENT — MIFFLIN-ST. JEOR: SCORE: 1429.39

## 2021-09-01 NOTE — PATIENT INSTRUCTIONS
Per physician instructions      If you have questions or concerns on any instructions given to you by your provider today or if you need to schedule an appointment, you can reach us at [phone].

## 2021-09-01 NOTE — LETTER
9/1/2021         RE: Ifrah Huitron  [address] [place] [address]        Dear Colleague,    Thank you for referring your patient, Ifrah Huitron, to the [facility]. Please see a copy of my visit note below.    Chief Complaint   Patient presents with     Post-op Visit     Post op MICROLARYNGOSCOPY, LEFT TRUE VOCAL CORD INJECTION WITH PROLARYN 7-1-21     History of Present Illness  Ifrah Huitron is a 71 year old male who presents today for follow-up.  The patient went to the operating room on 7/1/2021 and underwent a left true vocal fold injection with Prolaryn gel.  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  The patient returns today for follow-up.      After the injection, the patient reports no improvement in his voice.  He continues to work with speech but is not having improvement.  He denies any significant swallowing difficulty.  No hemoptysis, neck lumps/bumps/swelling, or unintentional weight loss. The patient denies any recent intubations or throat procedures.  The patient is a lifetime non-smoker.  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    Past Medical History  Patient Active Problem List   Diagnosis     Calculus of kidney     Degeneration of lumbar or lumbosacral intervertebral disc     Eczema     Epidural lipomatosis     TUYET (generalized anxiety disorder)     Hypopituitarism (H)     Hypothyroidism     Osteoarthritis of spine with radiculopathy, lumbar region     Pituitary macroadenoma (H)     Rosacea     Chronic lower back pain     Spindle cell sarcoma (H)     Mesothelioma, malignant (H)     Vocal fold paralysis, left     Dysphonia     Muscle tension dysphonia     Mediastinal lymphadenopathy     Current Medications    Current Outpatient Medications:      celecoxib (CELEBREX) 200 MG capsule, Take 1 capsule (200 mg) by mouth 2 times daily . Note this is a new a strength!  Only one capsule at a time!, Disp: 60 capsule, Rfl: 3     cholecalciferol (VITAMIN D-1000 MAX ST) 1000 units TABS, Take 1,000 Units by mouth daily , Disp: , Rfl:      guaiFENesin-codeine (GUAIFENESIN AC) 100-10 MG/5ML syrup, Take 10 mLs by mouth every 4 hours as needed for cough, Disp: 118 mL, Rfl: 0     hydrocortisone (CORTEF) 10 MG tablet, Take 15 mg in the morning and 10 mg in the afternoon, Disp: , Rfl:      levothyroxine (SYNTHROID/LEVOTHROID) 75 MCG tablet, Take 75 mcg by mouth every morning , Disp: , Rfl:      metroNIDAZOLE (METROGEL) 0.75 % external gel, Apply topically 2 times daily, Disp: 45 g, Rfl: 0     omeprazole (PRILOSEC) 20 MG DR capsule, Take 20 mg by mouth, Disp: , Rfl:      SUMAtriptan (IMITREX) 50 MG tablet, Take 1 tablet (50 mg) by mouth at onset of headache for migraine May repeat in 2 hours. Max 4 tablets/24 hours., Disp: 6 tablet, Rfl: 1     triamcinolone (KENALOG) 0.1 % external cream, Apply topically 2 times daily , Disp: , Rfl:      HYDROmorphone (DILAUDID) 2 MG tablet, Take 0.5-1 tablets (1-2 mg) by mouth 3 times daily as needed for severe pain (Patient not taking: Reported on 9/1/2021), Disp: 14 tablet, Rfl: 0     prochlorperazine (COMPAZINE) 10 MG tablet, Take 1 tablet (10 mg) by mouth every 6 hours as needed for nausea or vomiting (Patient not taking: Reported on 9/1/2021), Disp: 30 tablet, Rfl: 3    Allergies  Allergies   Allergen Reactions     Penicillins Other (See Comments) and Hives     swelling, hives         Social History  Social History     Socioeconomic History     Marital status:      Spouse name: Not on file     Number of children: Not on file     Years of education: Not on file     Highest education level: Not on file   Occupational History     Not on file   Tobacco Use     Smoking status: Never Smoker     Smokeless tobacco: Never Used   Substance and Sexual Activity     Alcohol use: Yes     Comment: rare     Drug use: Never     Sexual activity: Not on file   Other  "Topics Concern     Parent/sibling w/ CABG, MI or angioplasty before 65F 55M? Not Asked   Social History Narrative     Not on file     Social Determinants of Health     Financial Resource Strain:      Difficulty of Paying Living Expenses:    Food Insecurity:      Worried About Running Out of Food in the Last Year:      Ran Out of Food in the Last Year:    Transportation Needs:      Lack of Transportation (Medical):      Lack of Transportation (Non-Medical):    Physical Activity:      Days of Exercise per Week:      Minutes of Exercise per Session:    Stress:      Feeling of Stress :    Social Connections:      Frequency of Communication with Friends and Family:      Frequency of Social Gatherings with Friends and Family:      Attends Baptism Services:      Active Member of Clubs or Organizations:      Attends Club or Organization Meetings:      Marital Status:    Intimate Partner Violence:      Fear of Current or Ex-Partner:      Emotionally Abused:      Physically Abused:      Sexually Abused:        Family History  Family History   Problem Relation Age of Onset     Lupus Mother      ALS Father      Rheumatoid Arthritis Sister        Review of Systems  As per HPI and PMHx, otherwise 10 system review including the head and neck, constitutional, eyes, respiratory, GI, skin, neurologic, lymphatic, endocrine, and allergy systems is negative.    Physical Exam  BP (!) 155/95   Temp 96.9  F (36.1  C) (Tympanic)   Ht 1.702 m (5' 7\")   Wt 72.6 kg (160 lb)   BMI 25.06 kg/m    GENERAL: Patient is a pleasant, cooperative 71 year old male in no acute distress.  HEAD: Normocephalic, atraumatic.  Hair and scalp are normal.  EYES: Pupils are equal, round, reactive to light and accommodation.  Extraocular movements are intact.  The sclera nonicteric without injection.  The extraocular structures are normal.  EARS: Normal shape and symmetry.  No tenderness when palpating the mastoid or tragal areas bilaterally.   NOSE: Nares " are patent.  Nasal mucosa is pink and moist.  Nasal septum is midline.  Negative anterior rhinoscopy.  ORAL CAVITY: Dentition is in good repair.  Mucous membranes are moist.  Tongue is mobile, protrudes to the midline.  Palate elevates symmetrically.  No erythema or exudate.  No oral cavity or oropharyngeal masses, lesions, ulcerations, leukoplakia.  NECK: Supple, trachea is midline.  There no palpable cervical lymphadenopathy or masses bilaterally.  Palpation of the bilateral parotid and submandibular areas reveal no masses.  No thyromegaly.    NEUROLOGIC: Cranial nerves II through XII are grossly intact.  The patients voice is hoarse and raspy.  Patient does show some signs of right hemifacial spasm.  He also has some weakness in the facial nerve on the right-hand side.  The patient is House-Brackmann III/VI on the right for some brow weakness, mid facial weakness, and marginal division weakness.  Patient is House-Brackmann I/VI.  CARDIOVASCULAR: Extremities are warm and well-perfused.  No significant peripheral edema.  RESPIRATORY: Patient has nonlabored breathing without cough, wheeze, stridor.  PSYCHIATRIC: Patient is alert and oriented.  Mood and affect appear normal.  SKIN: Warm and dry.  No scalp, face, or neck lesions noted.     Procedure: Flexible Laryngoscopy  Indication: Dysphonia     To best visualize the upper airway anatomy and due to the chief complaint and HPI, I proceeded with flexible fiberoptic laryngoscopy examination.  The bilateral nasal cavities were anesthetized and decongested with a mixture of lidocaine and neosynephrine.  The bilateral nasal cavities were examined using a flexible fiberoptic laryngoscope.  There were no nasal cavity masses, polyps, or mucopurulence bilaterally.  The nasal septum is midline.  The nasopharynx had a normal appearance with normal Eustachian tube openings and fossa of Rosenmuller bilaterally.  Minimal adenoid tissue.  The base of tongue, vallecula, epiglottis,  aryepiglottic folds, arytenoids, and piriform sinuses were without mass or lesion.  There is a moderate amount of interarytenoid thickening and a mild amount of erythema.  The right true vocal fold has excellent abduction adduction, the left true vocal fold is in a fixed, paramedian position with some slight atrophy.  The bilateral true vocal folds were without nodules or masses.  There was severe supraglottic phonation/squeeze.  The visualized portions of the infraglottic and subglottic airway are unremarkable.  The scope was removed.  The patient tolerated the procedure well.                Assessment and Plan     ICD-10-CM    1. Vocal fold paralysis, left  J38.01 Otolaryngology Referral     LARYNGOSCOPY FLEX FIBEROPTIC, DIAGNOSTIC   2. Dysphonia  R49.0 Otolaryngology Referral     LARYNGOSCOPY FLEX FIBEROPTIC, DIAGNOSTIC   3. Muscle tension dysphonia  R49.0 Otolaryngology Referral     LARYNGOSCOPY FLEX FIBEROPTIC, DIAGNOSTIC   4. Mesothelioma, malignant (H)  C45.9 Otolaryngology Referral     LARYNGOSCOPY FLEX FIBEROPTIC, DIAGNOSTIC   5. Cough  R05 Otolaryngology Referral     LARYNGOSCOPY FLEX FIBEROPTIC, DIAGNOSTIC      It was my pleasure seeing Ifrah Huitron today in clinic.  Unfortunately, it looks like a lot of his injection is no longer present on the left.  He has severe muscle tension dysphonia on top of his left true vocal fold motion impairment.  I think the patient needs to go to the [facility] voice clinic.  I could do another injection, but I think that this would be not beneficial given his lack of improvement with the injection previously.  I placed a referral.    Ifrah to follow up with Primary Care provider regarding elevated blood pressure.    Kyree Bearden MD  Department of Otolaryngology-Head and Neck Surgery  [facility]         Again, thank you for allowing me to participate in the care of your patient.        Sincerely,        Kyree Bearden MD

## 2021-09-01 NOTE — NURSING NOTE
"Initial BP (!) 155/95   Temp 96.9  F (36.1  C) (Tympanic)   Ht 1.702 m (5' 7\")   Wt 72.6 kg (160 lb)   BMI 25.06 kg/m   Estimated body mass index is 25.06 kg/m  as calculated from the following:    Height as of this encounter: 1.702 m (5' 7\").    Weight as of this encounter: 72.6 kg (160 lb). .    Inez Bailey CMA    "

## 2021-09-02 ENCOUNTER — TELEPHONE (OUTPATIENT)
Dept: CARDIOLOGY | Facility: CLINIC | Age: 71
End: 2021-09-02

## 2021-09-03 NOTE — TELEPHONE ENCOUNTER
FUTURE VISIT INFORMATION      FUTURE VISIT INFORMATION:    Date: 12/9/21    Time: 11 AM with SLP    Location: [facility]  REFERRAL INFORMATION:    Referring provider: Dr. Kyree Bearden    Referring providers clinic: [facility]    Reason for visit/diagnosis: Left Vocal Fold Paralysis    RECORDS REQUESTED FROM:       Clinic name Comments Records Status Imaging Status   [facility] 9/1/21, 6/21/21 - ENT OV with Dr. Bearden  6/8/21, 3/29/21 - PCC OV with Dr. Kohler  5/13/19 - ED OV with Dr. Aaron Durham    [facility] 7/5/21 to 8/18/21 - SPEECH OV with CHINTAN Osuna [facility] 6/1/21 - ONC OV with Dr. Wolff  4/7/21 - PULM OV with Dr. Hope [facility] 4/29/21 - ED OV with Dr. Herminia Durham    [facility] - Surgery 7/1/21 - OP Note for MICROLARYNGOSCOPY, LEFT TRUE VOCAL CORD INJECTION WITH PROLARYN with Dr. Bearden [facility] - Imaging 8/2/21 - CT Chest  6/21/21 - CT Chest  3/26/21 - CT Chest  3/23/21 - XR Chest  5/23/19 - CTA Head Neck  5/23/19 - CT Chest/Abd/Pelvis [facility] 10/29/19 - CARDIO OV with Dr. Ana Ambriz [facility] 10/22/19 - PCC OV with Dr. Kev Ambriz [facility]

## 2021-09-05 ENCOUNTER — HEALTH MAINTENANCE LETTER (OUTPATIENT)
Age: 71
End: 2021-09-05

## 2021-09-09 NOTE — TELEPHONE ENCOUNTER
[facility] Triage    [facility] - [facility]    Followed by Ole for pain medication    Spoke to his Rachel (wife) at 1000 am    He started back on 2 a day Tuesday and Wednesday and he will continue on 2 a day.  Starting to improve.  He is golfing this morning.    He has Celecoxib (Celebrex) 200mg - 21 pills left - about 10 days.        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     Pain doing well due to pembro response I think.   Rec reduce celecoxib to 200 mg daily for a week, then just prn afterwards if he feels he doesn't need it. If his pain worsens doing this of course we should stay on previous effective dose of celecoxib.  Ok to continue Tylenol.  Off opioids.    Routing to Dr. Handy and Nora Beyer High priority.

## 2021-09-09 NOTE — TELEPHONE ENCOUNTER
Called and spoke with patient. Pain is okay right now. He is back to taking celebrex BID as of a few days ago. Advised it is okay for him to be back on that med. Explained it can take a few days to kick in. Asked him to call with any changes/questions/concerns.

## 2021-09-13 ENCOUNTER — VIRTUAL VISIT (OUTPATIENT)
Dept: ONCOLOGY | Facility: CLINIC | Age: 71
End: 2021-09-13
Attending: PHYSICIAN ASSISTANT
Payer: MEDICARE

## 2021-09-13 ENCOUNTER — APPOINTMENT (OUTPATIENT)
Dept: LAB | Facility: CLINIC | Age: 71
End: 2021-09-13
Payer: MEDICARE

## 2021-09-13 ENCOUNTER — INFUSION THERAPY VISIT (OUTPATIENT)
Dept: INFUSION THERAPY | Facility: CLINIC | Age: 71
End: 2021-09-13
Attending: INTERNAL MEDICINE
Payer: MEDICARE

## 2021-09-13 VITALS — TEMPERATURE: 98.1 F | HEART RATE: 65 BPM | DIASTOLIC BLOOD PRESSURE: 93 MMHG | SYSTOLIC BLOOD PRESSURE: 153 MMHG

## 2021-09-13 DIAGNOSIS — C45.9 MESOTHELIOMA, MALIGNANT (H): ICD-10-CM

## 2021-09-13 DIAGNOSIS — C49.9 SPINDLE CELL SARCOMA (H): Primary | ICD-10-CM

## 2021-09-13 LAB
ALBUMIN SERPL-MCNC: 3.6 G/DL (ref 3.4–5)
ALP SERPL-CCNC: 127 U/L (ref 40–150)
ALT SERPL W P-5'-P-CCNC: 23 U/L (ref 0–70)
ANION GAP SERPL CALCULATED.3IONS-SCNC: 4 MMOL/L (ref 3–14)
AST SERPL W P-5'-P-CCNC: 16 U/L (ref 0–45)
BASOPHILS # BLD AUTO: 0.1 10E3/UL (ref 0–0.2)
BASOPHILS NFR BLD AUTO: 1 %
BILIRUB SERPL-MCNC: 0.4 MG/DL (ref 0.2–1.3)
BUN SERPL-MCNC: 12 MG/DL (ref 7–30)
CALCIUM SERPL-MCNC: 8.9 MG/DL (ref 8.5–10.1)
CHLORIDE BLD-SCNC: 109 MMOL/L (ref 94–109)
CO2 SERPL-SCNC: 27 MMOL/L (ref 20–32)
CREAT SERPL-MCNC: 0.77 MG/DL (ref 0.66–1.25)
EOSINOPHIL # BLD AUTO: 0.4 10E3/UL (ref 0–0.7)
EOSINOPHIL NFR BLD AUTO: 4 %
ERYTHROCYTE [DISTWIDTH] IN BLOOD BY AUTOMATED COUNT: 13.7 % (ref 10–15)
GFR SERPL CREATININE-BSD FRML MDRD: 90 ML/MIN/1.73M2
GLUCOSE BLD-MCNC: 111 MG/DL (ref 70–99)
HCT VFR BLD AUTO: 46.1 % (ref 40–53)
HGB BLD-MCNC: 14.8 G/DL (ref 13.3–17.7)
IMM GRANULOCYTES # BLD: 0 10E3/UL
IMM GRANULOCYTES NFR BLD: 0 %
LYMPHOCYTES # BLD AUTO: 1.8 10E3/UL (ref 0.8–5.3)
LYMPHOCYTES NFR BLD AUTO: 20 %
MCH RBC QN AUTO: 27.1 PG (ref 26.5–33)
MCHC RBC AUTO-ENTMCNC: 32.1 G/DL (ref 31.5–36.5)
MCV RBC AUTO: 84 FL (ref 78–100)
MONOCYTES # BLD AUTO: 0.7 10E3/UL (ref 0–1.3)
MONOCYTES NFR BLD AUTO: 8 %
NEUTROPHILS # BLD AUTO: 5.8 10E3/UL (ref 1.6–8.3)
NEUTROPHILS NFR BLD AUTO: 67 %
NRBC # BLD AUTO: 0 10E3/UL
NRBC BLD AUTO-RTO: 0 /100
PLATELET # BLD AUTO: 240 10E3/UL (ref 150–450)
POTASSIUM BLD-SCNC: 3.8 MMOL/L (ref 3.4–5.3)
PROT SERPL-MCNC: 7.3 G/DL (ref 6.8–8.8)
RBC # BLD AUTO: 5.46 10E6/UL (ref 4.4–5.9)
SODIUM SERPL-SCNC: 140 MMOL/L (ref 133–144)
T4 FREE SERPL-MCNC: 1.24 NG/DL (ref 0.76–1.46)
TSH SERPL DL<=0.005 MIU/L-ACNC: 0.33 MU/L (ref 0.4–4)
WBC # BLD AUTO: 8.7 10E3/UL (ref 4–11)

## 2021-09-13 PROCEDURE — 82040 ASSAY OF SERUM ALBUMIN: CPT | Performed by: PHYSICIAN ASSISTANT

## 2021-09-13 PROCEDURE — 99214 OFFICE O/P EST MOD 30 MIN: CPT | Mod: 95 | Performed by: PHYSICIAN ASSISTANT

## 2021-09-13 PROCEDURE — 96413 CHEMO IV INFUSION 1 HR: CPT

## 2021-09-13 PROCEDURE — 84439 ASSAY OF FREE THYROXINE: CPT | Performed by: PHYSICIAN ASSISTANT

## 2021-09-13 PROCEDURE — 36415 COLL VENOUS BLD VENIPUNCTURE: CPT | Performed by: PHYSICIAN ASSISTANT

## 2021-09-13 PROCEDURE — 258N000003 HC RX IP 258 OP 636: Performed by: PHYSICIAN ASSISTANT

## 2021-09-13 PROCEDURE — 999N001193 HC VIDEO/TELEPHONE VISIT; NO CHARGE

## 2021-09-13 PROCEDURE — 84443 ASSAY THYROID STIM HORMONE: CPT | Performed by: PHYSICIAN ASSISTANT

## 2021-09-13 PROCEDURE — 85025 COMPLETE CBC W/AUTO DIFF WBC: CPT | Performed by: PHYSICIAN ASSISTANT

## 2021-09-13 PROCEDURE — 250N000011 HC RX IP 250 OP 636: Performed by: PHYSICIAN ASSISTANT

## 2021-09-13 RX ORDER — DIPHENHYDRAMINE HYDROCHLORIDE 50 MG/ML
50 INJECTION INTRAMUSCULAR; INTRAVENOUS
Status: CANCELLED
Start: 2021-09-13

## 2021-09-13 RX ORDER — EPINEPHRINE 1 MG/ML
0.3 INJECTION, SOLUTION INTRAMUSCULAR; SUBCUTANEOUS EVERY 5 MIN PRN
Status: CANCELLED | OUTPATIENT
Start: 2021-09-13

## 2021-09-13 RX ORDER — ALBUTEROL SULFATE 90 UG/1
1-2 AEROSOL, METERED RESPIRATORY (INHALATION)
Status: CANCELLED
Start: 2021-09-13

## 2021-09-13 RX ORDER — MEPERIDINE HYDROCHLORIDE 25 MG/ML
25 INJECTION INTRAMUSCULAR; INTRAVENOUS; SUBCUTANEOUS EVERY 30 MIN PRN
Status: CANCELLED | OUTPATIENT
Start: 2021-09-13

## 2021-09-13 RX ORDER — HEPARIN SODIUM (PORCINE) LOCK FLUSH IV SOLN 100 UNIT/ML 100 UNIT/ML
5 SOLUTION INTRAVENOUS
Status: CANCELLED | OUTPATIENT
Start: 2021-09-13

## 2021-09-13 RX ORDER — LORAZEPAM 2 MG/ML
0.5 INJECTION INTRAMUSCULAR EVERY 4 HOURS PRN
Status: CANCELLED
Start: 2021-09-13

## 2021-09-13 RX ORDER — NALOXONE HYDROCHLORIDE 0.4 MG/ML
0.2 INJECTION, SOLUTION INTRAMUSCULAR; INTRAVENOUS; SUBCUTANEOUS
Status: CANCELLED | OUTPATIENT
Start: 2021-09-13

## 2021-09-13 RX ORDER — METHYLPREDNISOLONE SODIUM SUCCINATE 125 MG/2ML
125 INJECTION, POWDER, LYOPHILIZED, FOR SOLUTION INTRAMUSCULAR; INTRAVENOUS
Status: CANCELLED
Start: 2021-09-13

## 2021-09-13 RX ORDER — HEPARIN SODIUM,PORCINE 10 UNIT/ML
5 VIAL (ML) INTRAVENOUS
Status: CANCELLED | OUTPATIENT
Start: 2021-09-13

## 2021-09-13 RX ORDER — ALBUTEROL SULFATE 0.83 MG/ML
2.5 SOLUTION RESPIRATORY (INHALATION)
Status: CANCELLED | OUTPATIENT
Start: 2021-09-13

## 2021-09-13 RX ADMIN — SODIUM CHLORIDE 200 MG: 9 INJECTION, SOLUTION INTRAVENOUS at 14:48

## 2021-09-13 RX ADMIN — SODIUM CHLORIDE 250 ML: 9 INJECTION, SOLUTION INTRAVENOUS at 14:47

## 2021-09-13 NOTE — PROGRESS NOTES
Oncology/Hematology Visit Note  Sep 13, 2021    Reason for Visit: Follow up of spindle cell sarcoma     History of Present Illness: Ifrah Huitron is a 71 year old male with PMH rosacea, pituitary macroadenoma s/p resection, GERD, kidney stones, DJD with metastatic spindle cell sarcoma. He presented to his PCP March 2021 with chest pain/left shoulder and back pain that led to imaging which revealed multiple lung mets. There were difficulties in getting diagnostic biopsy, eventually biopsy of mediastinal mass with spindle cell sarcoma. There was high PD-L1 expression (90%). Baseline imaging 6/21/21 with progression of lung mets and left-sided subdiaphragmatic mass, stable liver lesions. 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      He started Keytruda 6/21/21. CT 8/2/21 with positive response to treatment. He was called today for oncology follow-up.     Interval History:  Ifrah was seen today on video  for oncology follow-up. He is overall doing well. His SOB with talking is improved since working with speech therapy. He still has WALKER but it is stable to slightly improved and he is seeing cards next week. He denies any chest pain and his cough is slightly improved. He did have more cancer related pain when he cut back his Celebrex, now back on BID and improved. No other areas of pain. He denies any GI issues this last cycle including no diarrhea or nausea. Still takes PPI daily which helps with dyspesia. He denies any fevers or skin issues. He continues to have episodes of dizziness/headaches/fatigue for which he takes an extra hydrocortisone which helps. These episodes are less frequent but still happening. He has not yet seen endocrine and is having a hard time getting in to see him.     Current Outpatient Medications   Medication Sig Dispense Refill     celecoxib (CELEBREX) 200 MG capsule Take 1 capsule (200 mg) by mouth 2 times  daily . Note this is a new a strength! Only one capsule at a time! 60 capsule 3     cholecalciferol (VITAMIN D-1000 MAX ST) 1000 units TABS Take 1,000 Units by mouth daily        guaiFENesin-codeine (GUAIFENESIN AC) 100-10 MG/5ML syrup Take 10 mLs by mouth every 4 hours as needed for cough 118 mL 0     hydrocortisone (CORTEF) 10 MG tablet Take 15 mg in the morning and 10 mg in the afternoon       HYDROmorphone (DILAUDID) 2 MG tablet Take 0.5-1 tablets (1-2 mg) by mouth 3 times daily as needed for severe pain (Patient not taking: Reported on 9/1/2021) 14 tablet 0     levothyroxine (SYNTHROID/LEVOTHROID) 75 MCG tablet Take 75 mcg by mouth every morning        metroNIDAZOLE (METROGEL) 0.75 % external gel Apply topically 2 times daily 45 g 0     omeprazole (PRILOSEC) 20 MG DR capsule Take 20 mg by mouth       prochlorperazine (COMPAZINE) 10 MG tablet Take 1 tablet (10 mg) by mouth every 6 hours as needed for nausea or vomiting (Patient not taking: Reported on 9/1/2021) 30 tablet 3     SUMAtriptan (IMITREX) 50 MG tablet Take 1 tablet (50 mg) by mouth at onset of headache for migraine May repeat in 2 hours. Max 4 tablets/24 hours. 6 tablet 1     triamcinolone (KENALOG) 0.1 % external cream Apply topically 2 times daily        Physical Examination:  There were no vitals taken for this visit.  Wt Readings from Last 10 Encounters:   09/01/21 72.6 kg (160 lb)   08/11/21 72.6 kg (160 lb)   08/02/21 72.8 kg (160 lb 9.6 oz)   07/12/21 73 kg (161 lb)   07/01/21 73 kg (161 lb)   06/23/21 73.4 kg (161 lb 12.8 oz)   06/21/21 74.7 kg (164 lb 11.2 oz)   06/21/21 71.7 kg (158 lb)   06/08/21 71.8 kg (158 lb 6.4 oz)   04/29/21 73.9 kg (163 lb)     Video physical exam  General: Patient appears well in no acute distress.   Skin: No visualized rash or lesions on visualized skin  Eyes: EOMI, no erythema, sclera icterus or discharge noted  Resp: Appears to be breathing comfortably without accessory muscle usage, speaking in full sentences  though has to take breaks from breathing, no cough. Hoarse voice.   MSK: Appears to have normal range of motion based on visualized movements  Neurologic: No apparent tremors, facial movements symmetric  Psych: affect normal, alert and oriented    The rest of a comprehensive physical examination is deferred due to PHE (public health emergency) video restrictions    Laboratory Data:  To be done with infusion today      Assessment and Plan:  1. 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 High PD-L1. Started on Keytruda 6/21/21 and has received 4 cycles. Clinically improved. CT 8/2/21 with positive response to treatment. He is having dyspnea related to talking but have r/o pneumonitis previously. He is overall doing well on treatment.    As long as labs are WNL today will go ahead with cycle 5.     Plan to continue treatment every 3 weeks and repeat CT imaging end of October.      2. GI  Diarrhea: Possibly related to Keytruda, resolved. Continue Pepto PRN.     Dyspepsia: Improved with Omeprazole daily. Can take Tums/Pepto Bismol PRN     Nausea: Compazine PRN     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 Cal Saba endocrine. Will ask care coordinator to assist in scheduling follow-up.      TSH WNL, continue Synthroid 75mcg daily.      4. Derm  Rosacea: Long standing issue, now just continue Metrogel PRN which is helping.     5. MSK  Left shoulder/back/chest wall pain 2/2 tumor, following with palliative. Continue topical Bengay and PO Celebrex BID. Pain worsened when cut back so no changes to meds.      6. ENT  Hoarseness: Thought 2/2 mediastinal mass vs irritation from prior biopsy. Following with ENT, s/p vocal cord infection 7/1/21. Will see our voice clinic in Dec due to ongoing issues.      7. Pulm/Cards  Dyspnea with talking, prior work-up with CT PE negative for PE, infection, pneumonitis; troponin negative; COVID negative. Sating well on  RA. Cards referral next week. Agree with speech pathology thoughts on laryngeal dysfunction. Symptoms stable.      Continue Guaifenesin-Coedine PRN for cough, slightly improved.    25 minutes spent on the date of the encounter doing chart review, patient visit and documentation     Maynor Rios PA-C  [facility]  [address] [place] [address] [id number] [phone]

## 2021-09-13 NOTE — LETTER
9/13/2021         RE: Ifrah Huitron  [address] [place] [address]        Dear Colleague,    Thank you for referring your patient, Ifrah Huitron, to the [facility]. Please see a copy of my visit note below.    Oncology/Hematology Visit Note  Sep 13, 2021    Reason for Visit: Follow up of spindle cell sarcoma     History of Present Illness: Ifrah Huitron is a 71 year old male with PMH rosacea, pituitary macroadenoma s/p resection, GERD, kidney stones, DJD with metastatic spindle cell sarcoma. He presented to his PCP March 2021 with chest pain/left shoulder and back pain that led to imaging which revealed multiple lung mets. There were difficulties in getting diagnostic biopsy, eventually biopsy of mediastinal mass with spindle cell sarcoma. There was high PD-L1 expression (90%). Baseline imaging 6/21/21 with progression of lung mets and left-sided subdiaphragmatic mass, stable liver lesions. 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      He started Keytruda 6/21/21. CT 8/2/21 with positive response to treatment. He was called today for oncology follow-up.     Interval History:  Ifrah was seen today on video  for oncology follow-up. He is overall doing well. His SOB with talking is improved since working with speech therapy. He still has WALKER but it is stable to slightly improved and he is seeing cards next week. He denies any chest pain and his cough is slightly improved. He did have more cancer related pain when he cut back his Celebrex, now back on BID and improved. No other areas of pain. He denies any GI issues this last cycle including no diarrhea or nausea. Still takes PPI daily which helps with dyspesia. He denies any fevers or skin issues. He continues to have episodes of dizziness/headaches/fatigue for which he takes an extra hydrocortisone which helps. These episodes are less frequent  but still happening. He has not yet seen endocrine and is having a hard time getting in to see him.     Current Outpatient Medications   Medication Sig Dispense Refill     celecoxib (CELEBREX) 200 MG capsule Take 1 capsule (200 mg) by mouth 2 times daily . Note this is a new a strength! Only one capsule at a time! 60 capsule 3     cholecalciferol (VITAMIN D-1000 MAX ST) 1000 units TABS Take 1,000 Units by mouth daily        guaiFENesin-codeine (GUAIFENESIN AC) 100-10 MG/5ML syrup Take 10 mLs by mouth every 4 hours as needed for cough 118 mL 0     hydrocortisone (CORTEF) 10 MG tablet Take 15 mg in the morning and 10 mg in the afternoon       HYDROmorphone (DILAUDID) 2 MG tablet Take 0.5-1 tablets (1-2 mg) by mouth 3 times daily as needed for severe pain (Patient not taking: Reported on 9/1/2021) 14 tablet 0     levothyroxine (SYNTHROID/LEVOTHROID) 75 MCG tablet Take 75 mcg by mouth every morning        metroNIDAZOLE (METROGEL) 0.75 % external gel Apply topically 2 times daily 45 g 0     omeprazole (PRILOSEC) 20 MG DR capsule Take 20 mg by mouth       prochlorperazine (COMPAZINE) 10 MG tablet Take 1 tablet (10 mg) by mouth every 6 hours as needed for nausea or vomiting (Patient not taking: Reported on 9/1/2021) 30 tablet 3     SUMAtriptan (IMITREX) 50 MG tablet Take 1 tablet (50 mg) by mouth at onset of headache for migraine May repeat in 2 hours. Max 4 tablets/24 hours. 6 tablet 1     triamcinolone (KENALOG) 0.1 % external cream Apply topically 2 times daily        Physical Examination:  There were no vitals taken for this visit.  Wt Readings from Last 10 Encounters:   09/01/21 72.6 kg (160 lb)   08/11/21 72.6 kg (160 lb)   08/02/21 72.8 kg (160 lb 9.6 oz)   07/12/21 73 kg (161 lb)   07/01/21 73 kg (161 lb)   06/23/21 73.4 kg (161 lb 12.8 oz)   06/21/21 74.7 kg (164 lb 11.2 oz)   06/21/21 71.7 kg (158 lb)   06/08/21 71.8 kg (158 lb 6.4 oz)   04/29/21 73.9 kg (163 lb)     Video physical exam  General: Patient appears  well in no acute distress.   Skin: No visualized rash or lesions on visualized skin  Eyes: EOMI, no erythema, sclera icterus or discharge noted  Resp: Appears to be breathing comfortably without accessory muscle usage, speaking in full sentences though has to take breaks from breathing, no cough. Hoarse voice.   MSK: Appears to have normal range of motion based on visualized movements  Neurologic: No apparent tremors, facial movements symmetric  Psych: affect normal, alert and oriented    The rest of a comprehensive physical examination is deferred due to PHE (public health emergency) video restrictions    Laboratory Data:  To be done with infusion today      Assessment and Plan:  1. 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 High PD-L1. Started on Keytruda 6/21/21 and has received 4 cycles. Clinically improved. CT 8/2/21 with positive response to treatment. He is having dyspnea related to talking but have r/o pneumonitis previously. He is overall doing well on treatment.    As long as labs are WNL today will go ahead with cycle 5.     Plan to continue treatment every 3 weeks and repeat CT imaging end of October.      2. GI  Diarrhea: Possibly related to Keytruda, resolved. Continue Pepto PRN.     Dyspepsia: Improved with Omeprazole daily. Can take Tums/Pepto Bismol PRN     Nausea: Compazine PRN     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 Cal Saba endocrine. Will ask care coordinator to assist in scheduling follow-up.      TSH WNL, continue Synthroid 75mcg daily.      4. Derm  Rosacea: Long standing issue, now just continue Metrogel PRN which is helping.     5. MSK  Left shoulder/back/chest wall pain 2/2 tumor, following with palliative. Continue topical Bengay and PO Celebrex BID. Pain worsened when cut back so no changes to meds.      6. ENT  Hoarseness: Thought 2/2 mediastinal mass vs irritation from prior biopsy. Following with ENT,  s/p vocal cord infection 7/1/21. Will see our voice clinic in Dec due to ongoing issues.      7. Pulm/Cards  Dyspnea with talking, prior work-up with CT PE negative for PE, infection, pneumonitis; troponin negative; COVID negative. Sating well on RA. Cards referral next week. Agree with speech pathology thoughts on laryngeal dysfunction. Symptoms stable.      Continue Guaifenesin-Coedine PRN for cough, slightly improved.    25 minutes spent on the date of the encounter doing chart review, patient visit and documentation     Maynor Rios PA-C  [facility]  [address] [place] [id number]  [phone]      Ifrah is a 71 year old who is being evaluated via a billable video visit.      How would you like to obtain your AVS? MyChart  If the video visit is dropped, the invitation should be resent by: Text to cell phone: [phone]  Will anyone else be joining your video visit? No      Video Start Time: 7:26am  Video-Visit Details    Type of service:  Video Visit    Video End Time: 7:37am    Originating Location (pt. Location): Home    Distant Location (provider location):  [facility]     Platform used for Video Visit: [employer]      Again, thank you for allowing me to participate in the care of your patient.        Sincerely,        GERALDO Mullins

## 2021-09-13 NOTE — PROGRESS NOTES
Ifrah is a 71 year old who is being evaluated via a billable video visit.      How would you like to obtain your AVS? [employer]  If the video visit is dropped, the invitation should be resent by: Text to cell phone: [phone]  Will anyone else be joining your video visit? No      Video Start Time: 7:26am  Video-Visit Details    Type of service:  Video Visit    Video End Time: 7:37am    Originating Location (pt. Location): Home    Distant Location (provider location):  [facility]     Platform used for Video Visit: [employer]

## 2021-09-13 NOTE — PROGRESS NOTES
Infusion Nursing Note:  Ifrah Huitron presents today for C5D1 Keytruda   Patient seen by provider today: No   present during visit today: Not Applicable.    Note: N/A.      Intravenous Access:  Labs drawn without difficulty.  Peripheral IV placed.    Treatment Conditions:  Lab Results   Component Value Date     09/13/2021    POTASSIUM 3.8 09/13/2021    CR 0.77 09/13/2021    COTY 8.9 09/13/2021    BILITOTAL 0.4 09/13/2021    ALBUMIN 3.6 09/13/2021    ALT 23 09/13/2021    AST 16 09/13/2021     Results reviewed, labs MET treatment parameters, ok to proceed with treatment.      Post Infusion Assessment:  Patient tolerated infusion without incident.  Blood return noted pre and post infusion.  Site patent and intact, free from redness, edema or discomfort.  No evidence of extravasations.  Access discontinued per protocol.       Discharge Plan:   Patient discharged in stable condition accompanied by: self.  Departure Mode: Ambulatory.  Pt to return on 10/4/21 at 1;30 pm for labs followed by Keytruda at 2:00 pm.      Cathy Mann RN

## 2021-09-17 ENCOUNTER — PRE VISIT (OUTPATIENT)
Dept: CARDIOLOGY | Facility: CLINIC | Age: 71
End: 2021-09-17

## 2021-09-17 ENCOUNTER — OFFICE VISIT (OUTPATIENT)
Dept: CARDIOLOGY | Facility: CLINIC | Age: 71
End: 2021-09-17
Attending: STUDENT IN AN ORGANIZED HEALTH CARE EDUCATION/TRAINING PROGRAM
Payer: MEDICARE

## 2021-09-17 VITALS
WEIGHT: 161 LBS | BODY MASS INDEX: 25.27 KG/M2 | OXYGEN SATURATION: 96 % | HEIGHT: 67 IN | HEART RATE: 66 BPM | SYSTOLIC BLOOD PRESSURE: 155 MMHG | DIASTOLIC BLOOD PRESSURE: 89 MMHG

## 2021-09-17 DIAGNOSIS — R07.89 ATYPICAL CHEST PAIN: Primary | ICD-10-CM

## 2021-09-17 PROCEDURE — 99205 OFFICE O/P NEW HI 60 MIN: CPT | Performed by: STUDENT IN AN ORGANIZED HEALTH CARE EDUCATION/TRAINING PROGRAM

## 2021-09-17 PROCEDURE — 93005 ELECTROCARDIOGRAM TRACING: CPT

## 2021-09-17 PROCEDURE — G0463 HOSPITAL OUTPT CLINIC VISIT: HCPCS | Mod: 25

## 2021-09-17 ASSESSMENT — PAIN SCALES - GENERAL: PAINLEVEL: NO PAIN (0)

## 2021-09-17 ASSESSMENT — MIFFLIN-ST. JEOR: SCORE: 1433.92

## 2021-09-17 NOTE — LETTER
9/17/2021      RE: Ifrah Huitron  [address] [place] [address]       Dear Colleague,    Thank you for the opportunity to participate in the care of your patient, Ifrah Huitron, at the [facility] [place] [facility]. Please see a copy of my visit note below.    Chief complaint: Dyspnea    HPI:   Ifrah Huitron is a 71 year old male with history of spindle cell sarcoma who presents for evaluation of dyspnea.     Patient reports chronic shortness of breath that has progressed over the past three months his shortness of breath has progressed and he thinks it is related to his vocal chord paralysis. He is doing speech therapy and is getting slowly better.     He was able to walk from the parking ramp and he does golf two times a week without chest pain, his main limitation is when he is talking. He works as a real state agent.     ECG today shows: NSR with premature atrial contractions, HR 73 bpm.     He denies any PND, orthopnea, peripheral edema, palpitations, lightheadedness or syncope.       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 Note this is a new a strength! Only one capsule at a time! 60 capsule 3     cholecalciferol (VITAMIN D-1000 MAX ST) 1000 units TABS Take 1,000 Units by mouth daily        guaiFENesin-codeine (GUAIFENESIN AC) 100-10 MG/5ML syrup Take 10 mLs by mouth every 4 hours as needed for cough 118 mL 0     hydrocortisone (CORTEF) 10 MG tablet Take 15 mg in the morning and 10 mg in the afternoon       levothyroxine (SYNTHROID/LEVOTHROID) 75 MCG tablet Take 75 mcg by mouth every morning         metroNIDAZOLE (METROGEL) 0.75 % external gel Apply topically 2 times daily 45 g 11     omeprazole (PRILOSEC) 20 MG DR capsule Take 20 mg by mouth       prochlorperazine (COMPAZINE) 10 MG tablet Take 1 tablet (10 mg) by mouth every 6 hours as needed for nausea or vomiting 30 tablet 3     SUMAtriptan (IMITREX) 50 MG tablet Take 1 tablet (50 mg) by mouth at onset of headache for migraine May repeat in 2 hours. Max 4 tablets/24 hours. 6 tablet 1     HYDROmorphone (DILAUDID) 2 MG tablet Take 0.5-1 tablets (1-2 mg) by mouth 3 times daily as needed for severe pain (Patient not taking: Reported on 9/1/2021) 14 tablet 0     triamcinolone (KENALOG) 0.1 % external cream Apply topically 2 times daily          PAST SURGICAL HISTORY:  Past Surgical History:   Procedure Laterality Date     COLONOSCOPY N/A 12/17/2020    Procedure: COLONOSCOPY;  Surgeon: Ken Camacho MD;  Location: [facility]     ENT SURGERY       HERNIA REPAIR       LARYNGOSCOPY, EXCISE VOCAL CORD LESION MICROSCOPIC, COMBINED Left 7/1/2021    Procedure: MICROLARYNGOSCOPY, LEFT TRUE VOCAL CORD INJECTION WITH PROLARYN;  Surgeon: Kyree Bearden MD;  Location: [facility]     PHACOEMULSIFICATION WITH STANDARD INTRAOCULAR LENS IMPLANT Right 3/10/2021    Procedure: Cataract removal with implant.;  Surgeon: Jamir Mac MD;  Location: [facility]     PHACOEMULSIFICATION WITH STANDARD INTRAOCULAR LENS IMPLANT Left 4/5/2021    Procedure: Cataract removal with implant.;  Surgeon: Jamir Mac MD;  Location: [facility]     PITUITARY EXCISION       tooth pulled 4/7  Right        ALLERGIES:     Allergies   Allergen Reactions     Penicillins Other (See Comments) and Hives     swelling, hives         FAMILY HISTORY:  Family History   Problem Relation Age of Onset     Lupus Mother      ALS Father      Rheumatoid Arthritis Sister        SOCIAL HISTORY:  Social History     Tobacco Use     Smoking status: Never Smoker     Smokeless tobacco: Never Used   Substance Use Topics  "    Alcohol use: Yes     Comment: rare     Drug use: Never       ROS:   A comprehensive 14 point review of systems is negative other than as mentioned in HPI.    Exam:  BP (!) 155/89 (BP Location: Left arm, Patient Position: Chair, Cuff Size: Adult Regular)   Pulse 66   Ht 1.702 m (5' 7\")   Wt 73 kg (161 lb)   SpO2 96%   BMI 25.22 kg/m    GENERAL APPEARANCE: healthy, alert and no distress  EYES: no icterus, no xanthelasmas  ENT: normal palate, mucosa moist, no central cyanosis  NECK: JVP not elevated  RESPIRATORY: lungs clear to auscultation - no rales, rhonchi or wheezes, no use of accessory muscles, no retractions, respirations are unlabored, normal respiratory rate  CARDIOVASCULAR: regular rhythm, normal S1 with physiologic split S2, no S3 or S4 and no murmur, click or rub.  GI: soft, non tender, bowel sounds normal,no abdominal bruits  EXTREMITIES: no edema, no bruits  NEURO: alert and oriented to person/place/time, normal speech, gait and affect  VASC: Radial, dorsalis pedis and posterior tibialis pulses 2+ bilaterally.  SKIN: no ecchymoses, no rashes.  PSYCH: cooperative, affect appropriate.     Labs:  Reviewed.     Testing/Procedures:  Outside results of note:  Outside records from [facility] were obtained, and relevant results/notes have been incorporated into HPI.  Stress echo 10/29/2019  Patient exercised 8 minutes on dexter protocol.   Normal Stress echo.     Assessment and Plan:   Ifrah Huitron is a 71 year old male with history of spindle cell sarcoma who presents for evaluation of dyspnea.     Dyspnea patient's dyspnea appears to be related to his recent procedure with residual vocal cord paralysis.  Although he does have significant risk factors and his dyspnea could also be an anginal equivalent.  He is noted to have coronary calcium on his chest CT. we will further investigate structural abnormalities with an echocardiogram and ischemia with a coronary CTA.  We discussed the possible " findings including the need for coronary angiogram pending the CT results, patient understands and agreeable with the plan.     Chart review time: 40 minutes  Visit time: 30 minutes  Total time spent: 70 minutes  >50% of this 30-minute visit were spent with the patient on in-person counseling and discussion regarding dyspnea, ischemia evaluation including invasive coronary angiography.     The patient states understanding and is agreeable with plan.     Gigi Vernon MD  Cardiology    CC  [facility] MOTTA

## 2021-09-17 NOTE — PROGRESS NOTES
Chief complaint: Dyspnea    HPI:   Ifrah Huitron is a 71 year old male with history of spindle cell sarcoma who presents for evaluation of dyspnea.     Patient reports chronic shortness of breath that has progressed over the past three months his shortness of breath has progressed and he thinks it is related to his vocal chord paralysis. He is doing speech therapy and is getting slowly better.     He was able to walk from the parking ramp and he does golf two times a week without chest pain, his main limitation is when he is talking. He works as a real state agent.     ECG today shows: NSR with premature atrial contractions, HR 73 bpm.     He denies any PND, orthopnea, peripheral edema, palpitations, lightheadedness or syncope.       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 Note this is a new a strength! Only one capsule at a time! 60 capsule 3     cholecalciferol (VITAMIN D-1000 MAX ST) 1000 units TABS Take 1,000 Units by mouth daily        guaiFENesin-codeine (GUAIFENESIN AC) 100-10 MG/5ML syrup Take 10 mLs by mouth every 4 hours as needed for cough 118 mL 0     hydrocortisone (CORTEF) 10 MG tablet Take 15 mg in the morning and 10 mg in the afternoon       levothyroxine (SYNTHROID/LEVOTHROID) 75 MCG tablet Take 75 mcg by mouth every morning        metroNIDAZOLE (METROGEL) 0.75 % external gel Apply topically 2 times daily 45 g 11     omeprazole (PRILOSEC) 20 MG DR capsule Take 20 mg by mouth       prochlorperazine (COMPAZINE) 10 MG tablet Take 1 tablet (10 mg) by mouth every 6 hours as needed for nausea or vomiting 30 tablet 3     SUMAtriptan (IMITREX) 50 MG tablet Take 1  "tablet (50 mg) by mouth at onset of headache for migraine May repeat in 2 hours. Max 4 tablets/24 hours. 6 tablet 1     HYDROmorphone (DILAUDID) 2 MG tablet Take 0.5-1 tablets (1-2 mg) by mouth 3 times daily as needed for severe pain (Patient not taking: Reported on 9/1/2021) 14 tablet 0     triamcinolone (KENALOG) 0.1 % external cream Apply topically 2 times daily          PAST SURGICAL HISTORY:  Past Surgical History:   Procedure Laterality Date     COLONOSCOPY N/A 12/17/2020    Procedure: COLONOSCOPY;  Surgeon: Ken Camacho MD;  Location: [facility]     ENT SURGERY       HERNIA REPAIR       LARYNGOSCOPY, EXCISE VOCAL CORD LESION MICROSCOPIC, COMBINED Left 7/1/2021    Procedure: MICROLARYNGOSCOPY, LEFT TRUE VOCAL CORD INJECTION WITH PROLARYN;  Surgeon: Kyree Bearden MD;  Location: [facility]     PHACOEMULSIFICATION WITH STANDARD INTRAOCULAR LENS IMPLANT Right 3/10/2021    Procedure: Cataract removal with implant.;  Surgeon: Jamir Mac MD;  Location: [facility]     PHACOEMULSIFICATION WITH STANDARD INTRAOCULAR LENS IMPLANT Left 4/5/2021    Procedure: Cataract removal with implant.;  Surgeon: Jamir Mac MD;  Location: [facility]     PITUITARY EXCISION       tooth pulled 4/7  Right        ALLERGIES:     Allergies   Allergen Reactions     Penicillins Other (See Comments) and Hives     swelling, hives         FAMILY HISTORY:  Family History   Problem Relation Age of Onset     Lupus Mother      ALS Father      Rheumatoid Arthritis Sister        SOCIAL HISTORY:  Social History     Tobacco Use     Smoking status: Never Smoker     Smokeless tobacco: Never Used   Substance Use Topics     Alcohol use: Yes     Comment: rare     Drug use: Never       ROS:   A comprehensive 14 point review of systems is negative other than as mentioned in HPI.    Exam:  BP (!) 155/89 (BP Location: Left arm, Patient Position: Chair, Cuff Size: Adult Regular)   Pulse 66   Ht 1.702 m (5' 7\")   Wt 73 kg (161 lb)   SpO2 96%   BMI " 25.22 kg/m    GENERAL APPEARANCE: healthy, alert and no distress  EYES: no icterus, no xanthelasmas  ENT: normal palate, mucosa moist, no central cyanosis  NECK: JVP not elevated  RESPIRATORY: lungs clear to auscultation - no rales, rhonchi or wheezes, no use of accessory muscles, no retractions, respirations are unlabored, normal respiratory rate  CARDIOVASCULAR: regular rhythm, normal S1 with physiologic split S2, no S3 or S4 and no murmur, click or rub.  GI: soft, non tender, bowel sounds normal,no abdominal bruits  EXTREMITIES: no edema, no bruits  NEURO: alert and oriented to person/place/time, normal speech, gait and affect  VASC: Radial, dorsalis pedis and posterior tibialis pulses 2+ bilaterally.  SKIN: no ecchymoses, no rashes.  PSYCH: cooperative, affect appropriate.     Labs:  Reviewed.     Testing/Procedures:  Outside results of note:  Outside records from [facility] were obtained, and relevant results/notes have been incorporated into HPI.  Stress echo 10/29/2019  Patient exercised 8 minutes on dexter protocol.   Normal Stress echo.     Assessment and Plan:   Ifrah Huitron is a 71 year old male with history of spindle cell sarcoma who presents for evaluation of dyspnea.     Dyspnea patient's dyspnea appears to be related to his recent procedure with residual vocal cord paralysis.  Although he does have significant risk factors and his dyspnea could also be an anginal equivalent.  He is noted to have coronary calcium on his chest CT. we will further investigate structural abnormalities with an echocardiogram and ischemia with a coronary CTA.  We discussed the possible findings including the need for coronary angiogram pending the CT results, patient understands and agreeable with the plan.     Chart review time: 40 minutes  Visit time: 30 minutes  Total time spent: 70 minutes  >50% of this 30-minute visit were spent with the patient on in-person counseling and discussion regarding dyspnea, ischemia  evaluation including invasive coronary angiography.     The patient states understanding and is agreeable with plan.     Gigi Vernon MD  Cardiology    CC  [facility]ITH[facility] MOTTA

## 2021-09-17 NOTE — PATIENT INSTRUCTIONS
You were seen at the [facility] clinic today.  You saw Dr. Gigi Vernon MD  Here are your Instructions:    1. Please schedule your echocardiogram to evaluate your cardiac function and coronary CTA to check for blockages on your coronaries.     If you have questions after this visit:  Office:  [phone] option #1, then #4 & ask for (nurse line)  Fax:  [phone]  After Hours:  [phone] option #4 ask to speak to he on-call Cardiologist  Appointments:  [phone] option #1, then option #1    We are going to schedule you for a CTA Coronary:  -No caffeine, alcohol or smoking 12 hours prior to test  -Nothing by mouth 3 hours prior, however it is OK to take your medications with a sip of water.  -If you are on oral diabetes medications:  Do not take on the day of the procedure.  If you take Glucophage or Metformin:  Do not take 48 hours post procedure.    -Report to the Gold Waiting room in the hospital      [facility], [address] [address]

## 2021-09-19 PROBLEM — C45.9: Status: ACTIVE | Noted: 2021-03-26

## 2021-09-19 LAB
ATRIAL RATE - MUSE: 73 BPM
DIASTOLIC BLOOD PRESSURE - MUSE: NORMAL MMHG
INTERPRETATION ECG - MUSE: NORMAL
P AXIS - MUSE: 12 DEGREES
PR INTERVAL - MUSE: 136 MS
QRS DURATION - MUSE: 78 MS
QT - MUSE: 382 MS
QTC - MUSE: 420 MS
R AXIS - MUSE: -29 DEGREES
SYSTOLIC BLOOD PRESSURE - MUSE: NORMAL MMHG
T AXIS - MUSE: 19 DEGREES
VENTRICULAR RATE- MUSE: 73 BPM

## 2021-09-29 ENCOUNTER — HOSPITAL ENCOUNTER (OUTPATIENT)
Dept: CT IMAGING | Facility: CLINIC | Age: 71
End: 2021-09-29
Attending: STUDENT IN AN ORGANIZED HEALTH CARE EDUCATION/TRAINING PROGRAM
Payer: MEDICARE

## 2021-09-29 ENCOUNTER — HOSPITAL ENCOUNTER (EMERGENCY)
Facility: CLINIC | Age: 71
Discharge: HOME | End: 2021-09-29
Attending: FAMILY MEDICINE | Admitting: FAMILY MEDICINE
Payer: MEDICARE

## 2021-09-29 ENCOUNTER — APPOINTMENT (OUTPATIENT)
Dept: MRI IMAGING | Facility: CLINIC | Age: 71
End: 2021-09-29
Attending: FAMILY MEDICINE
Payer: MEDICARE

## 2021-09-29 ENCOUNTER — HOSPITAL ENCOUNTER (OUTPATIENT)
Dept: CARDIOLOGY | Facility: CLINIC | Age: 71
End: 2021-09-29
Attending: STUDENT IN AN ORGANIZED HEALTH CARE EDUCATION/TRAINING PROGRAM
Payer: MEDICARE

## 2021-09-29 ENCOUNTER — TELEPHONE (OUTPATIENT)
Dept: CARDIOLOGY | Facility: CLINIC | Age: 71
End: 2021-09-29

## 2021-09-29 VITALS — RESPIRATION RATE: 16 BRPM | DIASTOLIC BLOOD PRESSURE: 92 MMHG | HEART RATE: 61 BPM | SYSTOLIC BLOOD PRESSURE: 147 MMHG

## 2021-09-29 VITALS
OXYGEN SATURATION: 97 % | HEART RATE: 52 BPM | DIASTOLIC BLOOD PRESSURE: 67 MMHG | RESPIRATION RATE: 11 BRPM | SYSTOLIC BLOOD PRESSURE: 128 MMHG

## 2021-09-29 DIAGNOSIS — R07.89 ATYPICAL CHEST PAIN: ICD-10-CM

## 2021-09-29 DIAGNOSIS — R20.0 NUMBNESS AND TINGLING IN RIGHT HAND: ICD-10-CM

## 2021-09-29 DIAGNOSIS — R20.2 NUMBNESS AND TINGLING IN RIGHT HAND: ICD-10-CM

## 2021-09-29 LAB
ANION GAP SERPL CALCULATED.3IONS-SCNC: 5 MMOL/L (ref 3–14)
BASOPHILS # BLD AUTO: 0 10E3/UL (ref 0–0.2)
BASOPHILS NFR BLD AUTO: 0 %
BUN SERPL-MCNC: 20 MG/DL (ref 7–30)
CALCIUM SERPL-MCNC: 8.6 MG/DL (ref 8.5–10.1)
CHLORIDE BLD-SCNC: 107 MMOL/L (ref 94–109)
CO2 SERPL-SCNC: 27 MMOL/L (ref 20–32)
CREAT SERPL-MCNC: 1.01 MG/DL (ref 0.66–1.25)
EOSINOPHIL # BLD AUTO: 0.4 10E3/UL (ref 0–0.7)
EOSINOPHIL NFR BLD AUTO: 3 %
ERYTHROCYTE [DISTWIDTH] IN BLOOD BY AUTOMATED COUNT: 14.3 % (ref 10–15)
GFR SERPL CREATININE-BSD FRML MDRD: 73 ML/MIN/1.73M2
GLUCOSE BLD-MCNC: 114 MG/DL (ref 70–99)
HCT VFR BLD AUTO: 44.4 % (ref 40–53)
HGB BLD-MCNC: 14.3 G/DL (ref 13.3–17.7)
HOLD SPECIMEN: NORMAL
LVEF ECHO: NORMAL
LYMPHOCYTES # BLD AUTO: 3.2 10E3/UL (ref 0.8–5.3)
LYMPHOCYTES NFR BLD AUTO: 25 %
MCH RBC QN AUTO: 27.8 PG (ref 26.5–33)
MCHC RBC AUTO-ENTMCNC: 32.2 G/DL (ref 31.5–36.5)
MCV RBC AUTO: 86 FL (ref 78–100)
MONOCYTES # BLD AUTO: 1.6 10E3/UL (ref 0–1.3)
MONOCYTES NFR BLD AUTO: 12 %
NEUTROPHILS # BLD AUTO: 7.5 10E3/UL (ref 1.6–8.3)
NEUTROPHILS NFR BLD AUTO: 59 %
PLATELET # BLD AUTO: 270 10E3/UL (ref 150–450)
POTASSIUM BLD-SCNC: 4.2 MMOL/L (ref 3.4–5.3)
RBC # BLD AUTO: 5.14 10E6/UL (ref 4.4–5.9)
SODIUM SERPL-SCNC: 139 MMOL/L (ref 133–144)
WBC # BLD AUTO: 12.7 10E3/UL (ref 4–11)

## 2021-09-29 PROCEDURE — 93005 ELECTROCARDIOGRAM TRACING: CPT | Performed by: FAMILY MEDICINE

## 2021-09-29 PROCEDURE — 36415 COLL VENOUS BLD VENIPUNCTURE: CPT | Performed by: FAMILY MEDICINE

## 2021-09-29 PROCEDURE — 70544 MR ANGIOGRAPHY HEAD W/O DYE: CPT

## 2021-09-29 PROCEDURE — 93010 ELECTROCARDIOGRAM REPORT: CPT | Performed by: FAMILY MEDICINE

## 2021-09-29 PROCEDURE — 255N000002 HC RX 255 OP 636: Performed by: FAMILY MEDICINE

## 2021-09-29 PROCEDURE — 96374 THER/PROPH/DIAG INJ IV PUSH: CPT | Mod: 59 | Performed by: FAMILY MEDICINE

## 2021-09-29 PROCEDURE — 250N000013 HC RX MED GY IP 250 OP 250 PS 637: Performed by: INTERNAL MEDICINE

## 2021-09-29 PROCEDURE — 250N000011 HC RX IP 250 OP 636: Performed by: FAMILY MEDICINE

## 2021-09-29 PROCEDURE — 70553 MRI BRAIN STEM W/O & W/DYE: CPT

## 2021-09-29 PROCEDURE — 93306 TTE W/DOPPLER COMPLETE: CPT

## 2021-09-29 PROCEDURE — 75574 CT ANGIO HRT W/3D IMAGE: CPT | Mod: MG

## 2021-09-29 PROCEDURE — 80048 BASIC METABOLIC PNL TOTAL CA: CPT | Performed by: FAMILY MEDICINE

## 2021-09-29 PROCEDURE — 85004 AUTOMATED DIFF WBC COUNT: CPT | Performed by: FAMILY MEDICINE

## 2021-09-29 PROCEDURE — 0502T CT FFR: CPT

## 2021-09-29 PROCEDURE — 99285 EMERGENCY DEPT VISIT HI MDM: CPT | Mod: 25 | Performed by: FAMILY MEDICINE

## 2021-09-29 PROCEDURE — 0503T CT FFR: CPT

## 2021-09-29 PROCEDURE — 70549 MR ANGIOGRAPH NECK W/O&W/DYE: CPT

## 2021-09-29 PROCEDURE — 0504T CT FFR: CPT | Performed by: INTERNAL MEDICINE

## 2021-09-29 PROCEDURE — G1004 CDSM NDSC: HCPCS | Performed by: INTERNAL MEDICINE

## 2021-09-29 PROCEDURE — 93306 TTE W/DOPPLER COMPLETE: CPT | Mod: 26 | Performed by: STUDENT IN AN ORGANIZED HEALTH CARE EDUCATION/TRAINING PROGRAM

## 2021-09-29 PROCEDURE — 250N000011 HC RX IP 250 OP 636: Performed by: INTERNAL MEDICINE

## 2021-09-29 PROCEDURE — A9585 GADOBUTROL INJECTION: HCPCS | Performed by: FAMILY MEDICINE

## 2021-09-29 PROCEDURE — 75574 CT ANGIO HRT W/3D IMAGE: CPT | Mod: 26 | Performed by: INTERNAL MEDICINE

## 2021-09-29 RX ORDER — ONDANSETRON 2 MG/ML
4 INJECTION INTRAMUSCULAR; INTRAVENOUS
Status: DISCONTINUED | OUTPATIENT
Start: 2021-09-29 | End: 2021-09-30 | Stop reason: HOSPADM

## 2021-09-29 RX ORDER — DIPHENHYDRAMINE HYDROCHLORIDE 50 MG/ML
25-50 INJECTION INTRAMUSCULAR; INTRAVENOUS
Status: DISCONTINUED | OUTPATIENT
Start: 2021-09-29 | End: 2021-09-30 | Stop reason: HOSPADM

## 2021-09-29 RX ORDER — NITROGLYCERIN 0.4 MG/1
.4-.8 TABLET SUBLINGUAL
Status: DISCONTINUED | OUTPATIENT
Start: 2021-09-29 | End: 2021-09-30 | Stop reason: HOSPADM

## 2021-09-29 RX ORDER — METOPROLOL TARTRATE 25 MG/1
25-100 TABLET, FILM COATED ORAL
Status: COMPLETED | OUTPATIENT
Start: 2021-09-29 | End: 2021-09-29

## 2021-09-29 RX ORDER — ACYCLOVIR 200 MG/1
0-1 CAPSULE ORAL
Status: DISCONTINUED | OUTPATIENT
Start: 2021-09-29 | End: 2021-09-30 | Stop reason: HOSPADM

## 2021-09-29 RX ORDER — LORAZEPAM 2 MG/ML
1 INJECTION INTRAMUSCULAR ONCE
Status: COMPLETED | OUTPATIENT
Start: 2021-09-29 | End: 2021-09-29

## 2021-09-29 RX ORDER — GADOBUTROL 604.72 MG/ML
10 INJECTION INTRAVENOUS ONCE
Status: COMPLETED | OUTPATIENT
Start: 2021-09-29 | End: 2021-09-29

## 2021-09-29 RX ORDER — METOPROLOL TARTRATE 1 MG/ML
5-15 INJECTION, SOLUTION INTRAVENOUS
Status: DISCONTINUED | OUTPATIENT
Start: 2021-09-29 | End: 2021-09-30 | Stop reason: HOSPADM

## 2021-09-29 RX ORDER — IOPAMIDOL 755 MG/ML
120 INJECTION, SOLUTION INTRAVASCULAR ONCE
Status: COMPLETED | OUTPATIENT
Start: 2021-09-29 | End: 2021-09-29

## 2021-09-29 RX ORDER — METHYLPREDNISOLONE SODIUM SUCCINATE 125 MG/2ML
125 INJECTION, POWDER, LYOPHILIZED, FOR SOLUTION INTRAMUSCULAR; INTRAVENOUS
Status: DISCONTINUED | OUTPATIENT
Start: 2021-09-29 | End: 2021-09-30 | Stop reason: HOSPADM

## 2021-09-29 RX ORDER — DIPHENHYDRAMINE HCL 25 MG
25 CAPSULE ORAL
Status: DISCONTINUED | OUTPATIENT
Start: 2021-09-29 | End: 2021-09-30 | Stop reason: HOSPADM

## 2021-09-29 RX ORDER — IVABRADINE 5 MG/1
5-15 TABLET, FILM COATED ORAL
Status: COMPLETED | OUTPATIENT
Start: 2021-09-29 | End: 2021-09-29

## 2021-09-29 RX ADMIN — LORAZEPAM 1 MG: 2 INJECTION INTRAMUSCULAR; INTRAVENOUS at 20:37

## 2021-09-29 RX ADMIN — NITROGLYCERIN 0.8 MG: 0.4 TABLET SUBLINGUAL at 13:45

## 2021-09-29 RX ADMIN — IVABRADINE 10 MG: 5 TABLET, FILM COATED ORAL at 12:23

## 2021-09-29 RX ADMIN — GADOBUTROL 10 ML: 604.72 INJECTION INTRAVENOUS at 20:48

## 2021-09-29 RX ADMIN — METOPROLOL TARTRATE 50 MG: 50 TABLET, FILM COATED ORAL at 12:23

## 2021-09-29 RX ADMIN — IOPAMIDOL 120 ML: 755 INJECTION, SOLUTION INTRAVENOUS at 13:40

## 2021-09-29 NOTE — TELEPHONE ENCOUNTER
Contacted by pt. Stated that he had a CTA today and since then he has been having consistent right arm numbness. States that previously he has had intermittent hand tingling and numbness and but this numbness is another kind and has been persistent. He has never had these symptoms before. Advised pt to to go to the nearest ER to ensure that he is not having a stroke/TIA. Told pt not to drive himself but rather to use an ambulance or get a family member to drive him. Pt stated that his wife will drive him.    Kalpesh Everett MD  Cardiology Fellow

## 2021-09-29 NOTE — PROGRESS NOTES
Pt arrived for Coronary CT angiogram. Test, meds and side effects reviewed with pt.  Resting HR 71 bpm. Given 50 mg PO Metoprolol + 10 mg PO Ivabradine per verbal order. Administered 0.8 mg SL nitro on CTA table per order. CTA completed.  Patient tolerated procedure well and denies symptoms of allergic reaction.  Post monitoring completed and VSS.  D/C instructions reviewed with pt whom verbalized understanding of need to increase PO fluids today. D/C to gold waiting room accompanied by staff.

## 2021-09-30 NOTE — ED NOTES
Pt returned from MRI - settled into bed and addressed needs - wife was called per request and she will be heading back to the hospital. Patient denies any other complaints at this time.

## 2021-09-30 NOTE — ED PROVIDER NOTES
"  History     Chief Complaint   Patient presents with     Numbness     R hand numbness, L shoulder pain and epigastric pain     HPI    Ifrah Huitron is a 71 year old male who comes in with right hand numbness.  He has a history of a spindle cell carcinoma with metastases for which she is seeing collagen getting Keytruda infusions every 3 weeks.  Has been having problems with exertional dyspnea.  He saw cardiology last week and had a CT angio today as well as an echocardiogram.  CT angio results are not yet available.  The echocardiogram was reassuring.  After the procedure beginning about 3 or 3:30 PM he developed numbness in his entire right hand.  This involves all the fingers.  He reports that he \"did not feel good\" this morning.  He felt like he had no stamina and this was weak all over.  He had a mild headache which is still present.  This was present before the procedure and still present afterward.  For about the past week he is also had watering in his visual field in the morning only which resolves after a brief period of time and is not present during the day.  He has some moderate nausea but no vomiting and no focal abdominal pain.  He describes this as indigestion.  He has some pain in his left shoulder also present since this morning.  He has been having a fever cough.  He is Covid vaccinated.  He has had a prior pituitary macroadenoma excised and is on hydrocortisone and levothyroxine as a result.  Also takes omeprazole and vitamin D as well as Celebrex.  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    CT angio of the brain in May 2019 showed multifocal stenoses of the intracranial vessels involving both the anterior and posterior circulation including a moderate to severe stenosis of the right P2 branch.  CT scan of the head at that time was normal.  No subsequent brain imaging.    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     Priority: Medium     Added automatically from request for surgery [id number]       Mediastinal lymphadenopathy 06/21/2021     Priority: Medium     Added automatically from request for surgery [id number]       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     Priority: Medium     Mesothelioma, 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 Thyroid started 4/10  Low cortisol [1] 5/7/10 so secondary adrenal insufficiency  hypophysectomy 8/23/10       Eczema 01/12/2009     Priority: Medium     Calculus of kidney 09/09/2004     Priority: Medium     Rosacea 09/09/2004     Priority: Medium        Past Medical History:    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     Thyroid disease        Past Surgical History:    Past Surgical History:   Procedure Laterality Date     COLONOSCOPY N/A  12/17/2020    Procedure: COLONOSCOPY;  Surgeon: Ken Camacho MD;  Location: [facility]     ENT SURGERY       HERNIA REPAIR       LARYNGOSCOPY, EXCISE VOCAL CORD LESION MICROSCOPIC, COMBINED Left 7/1/2021    Procedure: MICROLARYNGOSCOPY, LEFT TRUE VOCAL CORD INJECTION WITH PROLARYN;  Surgeon: Kyree Bearden MD;  Location: [facility]     PHACOEMULSIFICATION WITH STANDARD INTRAOCULAR LENS IMPLANT Right 3/10/2021    Procedure: Cataract removal with implant.;  Surgeon: Jamir Mac MD;  Location: [facility]     PHACOEMULSIFICATION WITH STANDARD INTRAOCULAR LENS IMPLANT Left 4/5/2021    Procedure: Cataract removal with implant.;  Surgeon: Jamir Mac MD;  Location: [facility]     PITUITARY EXCISION       tooth pulled 4/7  Right        Family History:    Family History   Problem Relation Age of Onset     Lupus Mother      ALS Father      Rheumatoid Arthritis Sister        Social History:  Marital Status:   [2]  Social History     Tobacco Use     Smoking status: Never Smoker     Smokeless tobacco: Never Used   Substance Use Topics     Alcohol use: Yes     Comment: rare     Drug use: Never        Medications:    celecoxib (CELEBREX) 200 MG capsule  cholecalciferol (VITAMIN D-1000 MAX ST) 1000 units TABS  guaiFENesin-codeine (GUAIFENESIN AC) 100-10 MG/5ML syrup  hydrocortisone (CORTEF) 10 MG tablet  HYDROmorphone (DILAUDID) 2 MG tablet  levothyroxine (SYNTHROID/LEVOTHROID) 75 MCG tablet  metroNIDAZOLE (METROGEL) 0.75 % external gel  omeprazole (PRILOSEC) 20 MG DR capsule  prochlorperazine (COMPAZINE) 10 MG tablet  SUMAtriptan (IMITREX) 50 MG tablet  triamcinolone (KENALOG) 0.1 % external cream      Review of Systems    All other systems are reviewed and are negative    Physical Exam   BP: (!) 146/75  Pulse: 55  Resp: 18  SpO2: 100 %      Physical Exam    Nursing note and vitals were reviewed.  Constitutional: Awake and alert, adequately nourished and developed appearing 71-year-old in no apparent discomfort, who  does not appear acutely ill, and who answers questions appropriately and cooperates with examination.  HEENT: EOMI.   Neck: Freely mobile.  Cardiovascular: Cardiac examination reveals normal heart rate and regular rhythm without murmur.  Pulmonary/Chest: Breathing is unlabored.  Breath sounds are clear and equal bilaterally.  There no retractions, tachypnea, rales, wheezes, or rhonchi.  Abdomen: Soft, nontender, no HSM or masses rebound or guarding.  Musculoskeletal: Extremities are warm and well-perfused and without edema  Neurological: Alert, oriented, thought content logical, coherent.  Regular testing is normal.  Motor strength is intact in the upper and lower extremities on manual muscle testing.  Finger-to-nose and heel-to-shin are well performed.  Rapid alternating movements are well performed.  Sensation is intact in the face except report of decreased sensation in the forehead on the right compared to the left.  Sensation is intact in the lower extremities bilaterally.  Motor tone is normal.  Skin: Warm, dry, no rashes.  Psychiatric: Affect broad and appropriate.      ED Course        Procedures              EKG Interpretation:      Interpreted by Felipe Pena MD  Time reviewed: 19:30  Symptoms at time of EKG: none   Rhythm: sinus   Rate: 54  Axis: normal  Ectopy: none  Conduction: normal  ST Segments/ T Waves: No ST-T wave changes  Q Waves: none  Comparison to prior: Unchanged from 8/2/2021    Clinical Impression: normal EKG          Critical Care time:  none     The patient has stroke symptoms:         ED Stroke specific documentation           NIHSS PDF     Patient last known well time: 3PM  ED Provider first to bedside at: arrival  CT Results received at: see report    Thrombolytics:   Not given due to minor/isolated/quickly resolving symptoms.    If treating with thrombolytics: Ensure SBP<180 and DBP<105 prior to treatment with thrombolytics.  Administering thrombolytics after treatment with IV  labetalol, hydralazine, or nicardipine is reasonable once BP control is established.    Endovascular Retrieval:  Not initiated due to absence of proximal vessel occlusion    National [employer] Stroke Scale (Baseline)  Time Performed: arrival     Score    Level of consciousness: (0)   Alert, keenly responsive    LOC questions: (0)   Answers both questions correctly    LOC commands: (0)   Performs both tasks correctly    Best gaze: (0)   Normal    Visual: (0)   No visual loss    Facial palsy: (0)   Normal symmetrical movements    Motor arm (left): (0)   No drift    Motor arm (right): (0)   No drift    Motor leg (left): (0)   No drift    Motor leg (right): (0)   No drift    Limb ataxia: (0)   Absent    Sensory: (0)   Normal- no sensory loss    Best language: (0)   Normal- no aphasia    Dysarthria: (0)   Normal    Extinction and inattention: (0)   No abnormality        Total Score:  0        Stroke Mimics were considered (including migraine headache, seizure disorder, hypoglycemia (or hyperglycemia), head or spinal trauma, CNS infection, Toxin ingestion and shock state (e.g. sepsis) .          Results for orders placed or performed during the hospital encounter of 09/29/21 (from the past 24 hour(s))   [place] Draw    Narrative    The following orders were created for panel order [place] Draw.  Procedure                               Abnormality         Status                     ---------                               -----------         ------                     Extra Blue Top Tube[[id number]]                              Final result               Extra Red Top Tube[[id number]]                               Final result               Extra Green Top (Lithium...[[id number]]                      Final result               Extra Green Top (Lithium...[[id number]]                      Final result               Extra Purple Top Tube[[id number]]                            Final result                 Please view  results for these tests on the individual orders.   Extra Blue Top Tube   Result Value Ref Range    Hold Specimen JIC    Extra Red Top Tube   Result Value Ref Range    Hold Specimen JIC    Extra Green Top (Lithium Heparin) Tube   Result Value Ref Range    Hold Specimen JIC    Extra Green Top (Lithium Heparin) Tube   Result Value Ref Range    Hold Specimen JIC    Extra Purple Top Tube   Result Value Ref Range    Hold Specimen JIC    CBC with platelets differential    Narrative    The following orders were created for panel order CBC with platelets differential.  Procedure                               Abnormality         Status                     ---------                               -----------         ------                     CBC with platelets and d...[[id number]]  Abnormal            Final result                 Please view results for these tests on the individual orders.   Basic metabolic panel   Result Value Ref Range    Sodium 139 133 - 144 mmol/L    Potassium 4.2 3.4 - 5.3 mmol/L    Chloride 107 94 - 109 mmol/L    Carbon Dioxide (CO2) 27 20 - 32 mmol/L    Anion Gap 5 3 - 14 mmol/L    Urea Nitrogen 20 7 - 30 mg/dL    Creatinine 1.01 0.66 - 1.25 mg/dL    Calcium 8.6 8.5 - 10.1 mg/dL    Glucose 114 (H) 70 - 99 mg/dL    GFR Estimate 73 >60 mL/min/1.73m2   CBC with platelets and differential   Result Value Ref Range    WBC Count 12.7 (H) 4.0 - 11.0 10e3/uL    RBC Count 5.14 4.40 - 5.90 10e6/uL    Hemoglobin 14.3 13.3 - 17.7 g/dL    Hematocrit 44.4 40.0 - 53.0 %    MCV 86 78 - 100 fL    MCH 27.8 26.5 - 33.0 pg    MCHC 32.2 31.5 - 36.5 g/dL    RDW 14.3 10.0 - 15.0 %    Platelet Count 270 150 - 450 10e3/uL    % Neutrophils 59 %    % Lymphocytes 25 %    % Monocytes 12 %    % Eosinophils 3 %    % Basophils 0 %    Absolute Neutrophils 7.5 1.6 - 8.3 10e3/uL    Absolute Lymphocytes 3.2 0.8 - 5.3 10e3/uL    Absolute Monocytes 1.6 (H) 0.0 - 1.3 10e3/uL    Absolute Eosinophils 0.4 0.0 - 0.7 10e3/uL    Absolute Basophils  0.0 0.0 - 0.2 10e3/uL   MRA Neck (Carotids) wo & w Contrast    Narrative    EXAM: MR BRAIN W/O and W CONTRAST, MRA NECK (CAROTIDS) WO and W CONTRAST, MRA BRAIN ([place]) WO CONTRAST, RADIOLOGIST CONSULT FOR CARDIOLOGY  LOCATION: [facility] (accession [id number]), [facility] (accession [id number]), [facility] (accession [id number]), [facility]   (accession [id number])  DATE/TIME: 9/29/2021 8:52 PM    INDICATION: Right hand numbness, spindle cell carcinoma with lung and liver mets  history of resection of sella mass.  COMPARISON: 6/30/2016 and 5/23/2019.  CONTRAST: 10ml gadavist (accession [id number]), 10ml gadavist (accession [id number]), 10ml gadavist (accession [id number]), iopamidol (ISOVUE-370) solution 120 mL (accession [id number])  TECHNIQUE:   1) Routine multiplanar multisequence head MRI without and with intravenous contrast.  2) 3D time-of-flight head MRA without intravenous contrast.  3) Neck MRA without and with IV contrast. Stenosis measurements made according to NASCET criteria unless otherwise specified.    FINDINGS:  HEAD MRI:  INTRACRANIAL CONTENTS: On the diffusion-weighted images there is no evidence of acute ischemia or restricted diffusion. There is no discrete mass lesion or midline shift. There is no extra-axial fluid collection or intracranial hemorrhage visualized. On   the FLAIR and T2-weighted images there are a few small foci of high signal within the periventricular and subcortical white matter consistent with minimal small vessel ischemic disease. There are appropriate flow voids within the cavernous portions of   the internal carotid arteries and the basilar artery. Following the administration of contrast no abnormal enhancement is visualized. There are appropriate flow voids within the cavernous portions of the internal carotid arteries and the basilar  artery.   The ventricular system, basal cisterns and the cortical sulci are within normal limits for the patient's age.    There is no evidence of cerebellar tonsillar ectopia. The corpus callosum is within normal limits. The sella region appears stable in the interval. The orbit regions are unremarkable. There is moderate mucosal thickening of the ethmoid air cells along   with mild mucosal thickening maxillary sinuses. The mastoid air cells and the middle ear regions are clear.     HEAD MRA:   ANTERIOR CIRCULATION: There are mild irregularities consistent with tendinosis the left of the M1 segments of the middle cerebral arteries bilaterally. There is no discrete vessel cutoff. This was described on the previous CTA. There is no discrete   aneurysm or high flow vascular malformation. There are patent small bilateral posterior communicating arteries.    POSTERIOR CIRCULATION: No stenosis/occlusion, aneurysm, or high flow vascular malformation. Balanced vertebral arteries supply a normal basilar artery.     NECK MRA:   RIGHT CAROTID: No measurable stenosis or dissection.    LEFT CAROTID: No measurable stenosis or dissection.    VERTEBRAL ARTERIES: No focal stenosis or dissection. Balanced vertebral arteries.    AORTIC ARCH: Classic aortic arch anatomy with no significant stenosis at the origin of the great vessels.      Impression    IMPRESSION:  HEAD MRI:   1.  No discrete mass lesion, hemorrhage or focal area suggestive of acute ischemia.  2.  No abnormal enhancement.  3.  Sella region stable in the interval.  4.  Stable minimal small vessel ischemic disease.    HEAD MRA:   1.  Mild stenosis is M1 segments middle cerebral arteries bilaterally described on previous CTA.  2.  No evidence of vessel occlusion, aneurysm or high flow vascular involving arteries of the [place].    NECK MRA:  1.  Normal configuration of the great vessels off the aortic arch with no significant stenosis of their origins.  2.  No  significant stenosis or irregularity involving the arteries of the neck.  3.  No radiographic evidence of dissection.   MR Brain w/o & w Contrast    Narrative    EXAM: MR BRAIN W/O and W CONTRAST, MRA NECK (CAROTIDS) WO and W CONTRAST, MRA BRAIN ([place]) WO CONTRAST, RADIOLOGIST CONSULT FOR CARDIOLOGY  LOCATION: [facility] (accession [id number][facility] (accession [id number][facility] (accession [id number]), [facility]   (accession [id number])  DATE/TIME: 9/29/2021 8:52 PM    INDICATION: Right hand numbness, spindle cell carcinoma with lung and liver mets  history of resection of sella mass.  COMPARISON: 6/30/2016 and 5/23/2019.  CONTRAST: 10ml gadavist (accession [id number]), 10ml gadavist (accession [id number]), 10ml gadavist (accession [id number]), iopamidol (ISOVUE-370) solution 120 mL (accession [id number])  TECHNIQUE:   1) Routine multiplanar multisequence head MRI without and with intravenous contrast.  2) 3D time-of-flight head MRA without intravenous contrast.  3) Neck MRA without and with IV contrast. Stenosis measurements made according to NASCET criteria unless otherwise specified.    FINDINGS:  HEAD MRI:  INTRACRANIAL CONTENTS: On the diffusion-weighted images there is no evidence of acute ischemia or restricted diffusion. There is no discrete mass lesion or midline shift. There is no extra-axial fluid collection or intracranial hemorrhage visualized. On   the FLAIR and T2-weighted images there are a few small foci of high signal within the periventricular and subcortical white matter consistent with minimal small vessel ischemic disease. There are appropriate flow voids within the cavernous portions of   the internal carotid arteries and the basilar artery. Following the administration of contrast no abnormal enhancement is visualized. There are appropriate flow  voids within the cavernous portions of the internal carotid arteries and the basilar artery.   The ventricular system, basal cisterns and the cortical sulci are within normal limits for the patient's age.    There is no evidence of cerebellar tonsillar ectopia. The corpus callosum is within normal limits. The sella region appears stable in the interval. The orbit regions are unremarkable. There is moderate mucosal thickening of the ethmoid air cells along   with mild mucosal thickening maxillary sinuses. The mastoid air cells and the middle ear regions are clear.     HEAD MRA:   ANTERIOR CIRCULATION: There are mild irregularities consistent with tendinosis the left of the M1 segments of the middle cerebral arteries bilaterally. There is no discrete vessel cutoff. This was described on the previous CTA. There is no discrete   aneurysm or high flow vascular malformation. There are patent small bilateral posterior communicating arteries.    POSTERIOR CIRCULATION: No stenosis/occlusion, aneurysm, or high flow vascular malformation. Balanced vertebral arteries supply a normal basilar artery.     NECK MRA:   RIGHT CAROTID: No measurable stenosis or dissection.    LEFT CAROTID: No measurable stenosis or dissection.    VERTEBRAL ARTERIES: No focal stenosis or dissection. Balanced vertebral arteries.    AORTIC ARCH: Classic aortic arch anatomy with no significant stenosis at the origin of the great vessels.      Impression    IMPRESSION:  HEAD MRI:   1.  No discrete mass lesion, hemorrhage or focal area suggestive of acute ischemia.  2.  No abnormal enhancement.  3.  Sella region stable in the interval.  4.  Stable minimal small vessel ischemic disease.    HEAD MRA:   1.  Mild stenosis is M1 segments middle cerebral arteries bilaterally described on previous CTA.  2.  No evidence of vessel occlusion, aneurysm or high flow vascular involving arteries of the [place].    NECK MRA:  1.  Normal configuration of the great  vessels off the aortic arch with no significant stenosis of their origins.  2.  No significant stenosis or irregularity involving the arteries of the neck.  3.  No radiographic evidence of dissection.   MRA Brain ([place]) wo Contrast    Narrative    EXAM: MR BRAIN W/O and W CONTRAST, MRA NECK (CAROTIDS) WO and W CONTRAST, MRA BRAIN ([place]) WO CONTRAST, RADIOLOGIST CONSULT FOR CARDIOLOGY  LOCATION: [facility] (accession [id number]), [facility] (accession [id number]), [facility] (accession [id number]), [facility]   (accession [id number])  DATE/TIME: 9/29/2021 8:52 PM    INDICATION: Right hand numbness, spindle cell carcinoma with lung and liver mets  history of resection of sella mass.  COMPARISON: 6/30/2016 and 5/23/2019.  CONTRAST: 10ml gadavist (accession [id number]), 10ml gadavist (accession [id number]), 10ml gadavist (accession [id number]), iopamidol (ISOVUE-370) solution 120 mL (accession [id number])  TECHNIQUE:   1) Routine multiplanar multisequence head MRI without and with intravenous contrast.  2) 3D time-of-flight head MRA without intravenous contrast.  3) Neck MRA without and with IV contrast. Stenosis measurements made according to NASCET criteria unless otherwise specified.    FINDINGS:  HEAD MRI:  INTRACRANIAL CONTENTS: On the diffusion-weighted images there is no evidence of acute ischemia or restricted diffusion. There is no discrete mass lesion or midline shift. There is no extra-axial fluid collection or intracranial hemorrhage visualized. On   the FLAIR and T2-weighted images there are a few small foci of high signal within the periventricular and subcortical white matter consistent with minimal small vessel ischemic disease. There are appropriate flow voids within the cavernous portions of   the internal carotid arteries and the basilar artery. Following  the administration of contrast no abnormal enhancement is visualized. There are appropriate flow voids within the cavernous portions of the internal carotid arteries and the basilar artery.   The ventricular system, basal cisterns and the cortical sulci are within normal limits for the patient's age.    There is no evidence of cerebellar tonsillar ectopia. The corpus callosum is within normal limits. The sella region appears stable in the interval. The orbit regions are unremarkable. There is moderate mucosal thickening of the ethmoid air cells along   with mild mucosal thickening maxillary sinuses. The mastoid air cells and the middle ear regions are clear.     HEAD MRA:   ANTERIOR CIRCULATION: There are mild irregularities consistent with tendinosis the left of the M1 segments of the middle cerebral arteries bilaterally. There is no discrete vessel cutoff. This was described on the previous CTA. There is no discrete   aneurysm or high flow vascular malformation. There are patent small bilateral posterior communicating arteries.    POSTERIOR CIRCULATION: No stenosis/occlusion, aneurysm, or high flow vascular malformation. Balanced vertebral arteries supply a normal basilar artery.     NECK MRA:   RIGHT CAROTID: No measurable stenosis or dissection.    LEFT CAROTID: No measurable stenosis or dissection.    VERTEBRAL ARTERIES: No focal stenosis or dissection. Balanced vertebral arteries.    AORTIC ARCH: Classic aortic arch anatomy with no significant stenosis at the origin of the great vessels.      Impression    IMPRESSION:  HEAD MRI:   1.  No discrete mass lesion, hemorrhage or focal area suggestive of acute ischemia.  2.  No abnormal enhancement.  3.  Sella region stable in the interval.  4.  Stable minimal small vessel ischemic disease.    HEAD MRA:   1.  Mild stenosis is M1 segments middle cerebral arteries bilaterally described on previous CTA.  2.  No evidence of vessel occlusion, aneurysm or high flow  vascular involving arteries of the [place].    NECK MRA:  1.  Normal configuration of the great vessels off the aortic arch with no significant stenosis of their origins.  2.  No significant stenosis or irregularity involving the arteries of the neck.  3.  No radiographic evidence of dissection.       Medications   LORazepam (ATIVAN) injection 1 mg (1 mg Intravenous Given 9/29/21 2037)   gadobutrol (GADAVIST) injection 10 mL (10 mLs Intravenous Given 9/29/21 2048)   sodium chloride (PF) 0.9% PF flush 50 mL (50 mLs Intracatheter Given 9/29/21 2049)       Assessments & Plan (with Medical Decision Making)     71-year-old male with history of a spindle cell carcinoma with metastases currently getting Keytruda therapy came in with right hand numbness that came on at about 3 PM after he had had a CT angiogram and echocardiogram.  He had no other neurologic signs or symptoms.  I could not reproduce the numbness on exam.  His NIH stroke scale was 0.  He underwent MRI imaging of the brain and MRA imaging of the brain and neck.  These did not show a cause for his symptoms with no stroke or metastatic tumor and no vascular disease to account for his hand numbness.  The numbness is still present at the time of discharge but he says he feels like it is improving.  I discussed his case with stroke neuro Dr. Chicas.  Given that the patient had the symptoms while having the MRI, we have low suspicion of a TIA or stroke as the cause for this symptom.  The cause for this is uncertain.  It could be a compression neuropathy.  Ii does not appear to be a vascular stenosis.  At this time I recommend no further work-up.  If the symptoms persist next week, he should ask his physician for referral to neurology for further investigation.  He expressed understanding and his questions were answered.    I have reviewed the nursing notes.    I have reviewed the findings, diagnosis, plan and need for follow up with the patient.       New  Prescriptions    No medications on file       Final diagnoses:   Numbness and tingling in right hand       9/29/2021   [facility] EMERGENCY DEPT     Felipe Pena MD  09/29/21 1542

## 2021-09-30 NOTE — CONSULTS
"    [facility]    Stroke Telephone Note    I was called by Felipe Pena on 09/29/21 regarding patient Ifrah Huitron. The patient is a 71 year old male with history of a spindle cell carcinoma with metastases for which she is seeing collagen getting Keytruda infusions every 3 weeks.  Has been having problems with exertional dyspnea.  He saw cardiology last week and had a CT angio today as well as an echocardiogram presents with R hand numbness without any other focal neurological deficits.    Imaging Findings   MRI shows no stroke or mass  MRA head shows bilateral MCA stenosis  MRA neck neg    Impression  Hand numbness    Recommendations   Given patient being symptomatic at the time of MRI and MRI negative for stroke likely etiology for hand numbness is peripheral  This can be evaluated as outpatient with general neurology  Bilateral MCA stenosis is asymptomatic.     My recommendations are based on the information provided over the phone by Ifrah Huitron's in-person providers. They are not intended to replace the clinical judgment of his in-person providers. I was not requested to personally see or examine the patient at this time.    Marshall Chicas MD  Vascular Neurology  To page me or covering stroke neurology team member, click here: AMCOM   Choose \"On Call\" tab at top, then search dropdown box for \"Neurology Adult\", select location, press Enter, then look for stroke/neuro ICU/telestroke.           "

## 2021-09-30 NOTE — ED TRIAGE NOTES
Right hand numbness since approximately 1500 today after having a CT procedure today at the [facility].  Patient called his doctor and doctor told him to come to the ER to be checked for a stroke.  Patient has right sided eye drooping, which he has had in the past.  Numbness is worse on right side of face though.

## 2021-10-01 DIAGNOSIS — E78.5 HYPERLIPIDEMIA, UNSPECIFIED HYPERLIPIDEMIA TYPE: Primary | ICD-10-CM

## 2021-10-01 RX ORDER — ROSUVASTATIN CALCIUM 40 MG/1
40 TABLET, COATED ORAL DAILY
Qty: 90 TABLET | Refills: 1 | Status: SHIPPED | OUTPATIENT
Start: 2021-10-01 | End: 2021-11-30

## 2021-10-04 ENCOUNTER — INFUSION THERAPY VISIT (OUTPATIENT)
Dept: INFUSION THERAPY | Facility: CLINIC | Age: 71
End: 2021-10-04
Attending: INTERNAL MEDICINE
Payer: MEDICARE

## 2021-10-04 ENCOUNTER — VIRTUAL VISIT (OUTPATIENT)
Dept: ONCOLOGY | Facility: CLINIC | Age: 71
End: 2021-10-04
Attending: PHYSICIAN ASSISTANT
Payer: MEDICARE

## 2021-10-04 ENCOUNTER — APPOINTMENT (OUTPATIENT)
Dept: LAB | Facility: CLINIC | Age: 71
End: 2021-10-04
Payer: MEDICARE

## 2021-10-04 VITALS — SYSTOLIC BLOOD PRESSURE: 150 MMHG | DIASTOLIC BLOOD PRESSURE: 81 MMHG | TEMPERATURE: 98 F | HEART RATE: 68 BPM

## 2021-10-04 DIAGNOSIS — C49.9 SPINDLE CELL SARCOMA (H): Primary | ICD-10-CM

## 2021-10-04 DIAGNOSIS — E03.4 HYPOTHYROIDISM DUE TO ACQUIRED ATROPHY OF THYROID: ICD-10-CM

## 2021-10-04 DIAGNOSIS — C45.9 MESOTHELIOMA, MALIGNANT (H): ICD-10-CM

## 2021-10-04 DIAGNOSIS — E23.0 HYPOPITUITARISM (H): ICD-10-CM

## 2021-10-04 LAB
ALBUMIN SERPL-MCNC: 3.4 G/DL (ref 3.4–5)
ALP SERPL-CCNC: 139 U/L (ref 40–150)
ALT SERPL W P-5'-P-CCNC: 23 U/L (ref 0–70)
ANION GAP SERPL CALCULATED.3IONS-SCNC: 7 MMOL/L (ref 3–14)
AST SERPL W P-5'-P-CCNC: 24 U/L (ref 0–45)
BASOPHILS # BLD AUTO: 0.1 10E3/UL (ref 0–0.2)
BASOPHILS NFR BLD AUTO: 1 %
BILIRUB SERPL-MCNC: 0.5 MG/DL (ref 0.2–1.3)
BUN SERPL-MCNC: 18 MG/DL (ref 7–30)
CALCIUM SERPL-MCNC: 8.5 MG/DL (ref 8.5–10.1)
CHLORIDE BLD-SCNC: 106 MMOL/L (ref 94–109)
CO2 SERPL-SCNC: 27 MMOL/L (ref 20–32)
CREAT SERPL-MCNC: 0.83 MG/DL (ref 0.66–1.25)
EOSINOPHIL # BLD AUTO: 0.5 10E3/UL (ref 0–0.7)
EOSINOPHIL NFR BLD AUTO: 5 %
ERYTHROCYTE [DISTWIDTH] IN BLOOD BY AUTOMATED COUNT: 13.4 % (ref 10–15)
GFR SERPL CREATININE-BSD FRML MDRD: 87 ML/MIN/1.73M2
GLUCOSE BLD-MCNC: 99 MG/DL (ref 70–99)
HCT VFR BLD AUTO: 46.4 % (ref 40–53)
HGB BLD-MCNC: 15.1 G/DL (ref 13.3–17.7)
IMM GRANULOCYTES # BLD: 0 10E3/UL
IMM GRANULOCYTES NFR BLD: 0 %
LYMPHOCYTES # BLD AUTO: 1.8 10E3/UL (ref 0.8–5.3)
LYMPHOCYTES NFR BLD AUTO: 17 %
MCH RBC QN AUTO: 27.6 PG (ref 26.5–33)
MCHC RBC AUTO-ENTMCNC: 32.5 G/DL (ref 31.5–36.5)
MCV RBC AUTO: 85 FL (ref 78–100)
MONOCYTES # BLD AUTO: 0.9 10E3/UL (ref 0–1.3)
MONOCYTES NFR BLD AUTO: 8 %
NEUTROPHILS # BLD AUTO: 7.1 10E3/UL (ref 1.6–8.3)
NEUTROPHILS NFR BLD AUTO: 69 %
NRBC # BLD AUTO: 0 10E3/UL
NRBC BLD AUTO-RTO: 0 /100
PLATELET # BLD AUTO: 231 10E3/UL (ref 150–450)
POTASSIUM BLD-SCNC: 3.8 MMOL/L (ref 3.4–5.3)
PROT SERPL-MCNC: 7.3 G/DL (ref 6.8–8.8)
RBC # BLD AUTO: 5.48 10E6/UL (ref 4.4–5.9)
SODIUM SERPL-SCNC: 140 MMOL/L (ref 133–144)
TSH SERPL DL<=0.005 MIU/L-ACNC: 0.74 MU/L (ref 0.4–4)
WBC # BLD AUTO: 10.3 10E3/UL (ref 4–11)

## 2021-10-04 PROCEDURE — 99214 OFFICE O/P EST MOD 30 MIN: CPT | Mod: 95 | Performed by: PHYSICIAN ASSISTANT

## 2021-10-04 PROCEDURE — 250N000011 HC RX IP 250 OP 636: Performed by: PHYSICIAN ASSISTANT

## 2021-10-04 PROCEDURE — 96413 CHEMO IV INFUSION 1 HR: CPT

## 2021-10-04 PROCEDURE — 82040 ASSAY OF SERUM ALBUMIN: CPT | Performed by: PHYSICIAN ASSISTANT

## 2021-10-04 PROCEDURE — 84443 ASSAY THYROID STIM HORMONE: CPT | Performed by: PHYSICIAN ASSISTANT

## 2021-10-04 PROCEDURE — 36415 COLL VENOUS BLD VENIPUNCTURE: CPT | Performed by: PHYSICIAN ASSISTANT

## 2021-10-04 PROCEDURE — 85004 AUTOMATED DIFF WBC COUNT: CPT | Performed by: PHYSICIAN ASSISTANT

## 2021-10-04 PROCEDURE — 258N000003 HC RX IP 258 OP 636: Performed by: PHYSICIAN ASSISTANT

## 2021-10-04 PROCEDURE — 36415 COLL VENOUS BLD VENIPUNCTURE: CPT

## 2021-10-04 RX ORDER — ALBUTEROL SULFATE 90 UG/1
1-2 AEROSOL, METERED RESPIRATORY (INHALATION)
Status: CANCELLED
Start: 2021-10-04

## 2021-10-04 RX ORDER — ALBUTEROL SULFATE 0.83 MG/ML
2.5 SOLUTION RESPIRATORY (INHALATION)
Status: CANCELLED | OUTPATIENT
Start: 2021-10-04

## 2021-10-04 RX ORDER — MEPERIDINE HYDROCHLORIDE 25 MG/ML
25 INJECTION INTRAMUSCULAR; INTRAVENOUS; SUBCUTANEOUS EVERY 30 MIN PRN
Status: CANCELLED | OUTPATIENT
Start: 2021-10-04

## 2021-10-04 RX ORDER — HEPARIN SODIUM,PORCINE 10 UNIT/ML
5 VIAL (ML) INTRAVENOUS
Status: CANCELLED | OUTPATIENT
Start: 2021-10-04

## 2021-10-04 RX ORDER — METHYLPREDNISOLONE SODIUM SUCCINATE 125 MG/2ML
125 INJECTION, POWDER, LYOPHILIZED, FOR SOLUTION INTRAMUSCULAR; INTRAVENOUS
Status: CANCELLED
Start: 2021-10-04

## 2021-10-04 RX ORDER — HEPARIN SODIUM (PORCINE) LOCK FLUSH IV SOLN 100 UNIT/ML 100 UNIT/ML
5 SOLUTION INTRAVENOUS
Status: CANCELLED | OUTPATIENT
Start: 2021-10-04

## 2021-10-04 RX ORDER — EPINEPHRINE 1 MG/ML
0.3 INJECTION, SOLUTION INTRAMUSCULAR; SUBCUTANEOUS EVERY 5 MIN PRN
Status: CANCELLED | OUTPATIENT
Start: 2021-10-04

## 2021-10-04 RX ORDER — LORAZEPAM 2 MG/ML
0.5 INJECTION INTRAMUSCULAR EVERY 4 HOURS PRN
Status: CANCELLED
Start: 2021-10-04

## 2021-10-04 RX ORDER — NALOXONE HYDROCHLORIDE 0.4 MG/ML
0.2 INJECTION, SOLUTION INTRAMUSCULAR; INTRAVENOUS; SUBCUTANEOUS
Status: CANCELLED | OUTPATIENT
Start: 2021-10-04

## 2021-10-04 RX ORDER — DIPHENHYDRAMINE HYDROCHLORIDE 50 MG/ML
50 INJECTION INTRAMUSCULAR; INTRAVENOUS
Status: CANCELLED
Start: 2021-10-04

## 2021-10-04 RX ADMIN — SODIUM CHLORIDE 200 MG: 9 INJECTION, SOLUTION INTRAVENOUS at 15:04

## 2021-10-04 RX ADMIN — SODIUM CHLORIDE 1000 ML: 9 INJECTION, SOLUTION INTRAVENOUS at 14:05

## 2021-10-04 NOTE — PROGRESS NOTES
Ifrah is a 71 year old who is being evaluated via a billable video visit.      How would you like to obtain your AVS? MyChart  If the video visit is dropped, the invitation should be resent by: Text to cell phone: [phone]  Will anyone else be joining your video visit? No      Video Start Time: 11:03am  Video-Visit Details    Type of service:  Video Visit    Video End Time: 11:32am    Originating Location (pt. Location): Home    Distant Location (provider location):  [facility]     Platform used for Video Visit: [employer]

## 2021-10-04 NOTE — PROGRESS NOTES
Infusion Nursing Note:  Ifrah Huitron presents today for Keytruda and fluids.    Patient seen by provider today: Yes: Maynor Dill   present during visit today: Not Applicable.    Note: N/A.      Intravenous Access:  Peripheral IV placed.    Treatment Conditions:  Lab Results   Component Value Date     10/04/2021    POTASSIUM 3.8 10/04/2021    CR 0.83 10/04/2021    COTY 8.5 10/04/2021    BILITOTAL 0.5 10/04/2021    ALBUMIN 3.4 10/04/2021    ALT 23 10/04/2021    AST 24 10/04/2021     Results reviewed, labs MET treatment parameters, ok to proceed with treatment.      Post Infusion Assessment:  Patient tolerated infusion without incident.  Blood return noted pre and post infusion.  Site patent and intact, free from redness, edema or discomfort.  No evidence of extravasations.  Access discontinued per protocol.       Discharge Plan:   Patient discharged in stable condition accompanied by: self.  Departure Mode: Ambulatory. Pt will return 10/25/2021.       Anup Velasco RN

## 2021-10-04 NOTE — PROGRESS NOTES
Oncology/Hematology Visit Note  Oct 4, 2021      Reason for Visit: Follow up of spindle cell sarcoma     History of Present Illness: Ifrah Huitron is a 71 year old male with PMH rosacea, pituitary macroadenoma s/p resection, GERD, kidney stones, DJD with metastatic spindle cell sarcoma. He presented to his PCP March 2021 with chest pain/left shoulder and back pain that led to imaging which revealed multiple lung mets. There were difficulties in getting diagnostic biopsy, eventually biopsy of mediastinal mass with spindle cell sarcoma. There was high PD-L1 expression (90%). Baseline imaging 6/21/21 with progression of lung mets and left-sided subdiaphragmatic mass, stable liver lesions. 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      He started Keytruda 6/21/21. CT 8/2/21 with positive response to treatment. He was called today for oncology follow-up.     Interval History:  Ifrah was called today for follow-up. He hasn't been feeling as well the past few days. He has more dyspepsia, tums has helped. He also has more pain in his back/chest wall, taking Celebrex and Bengay with some relief. He denies fevers. Has had less issues with headaches and dizziness, though still has episodes and hasn't been able to connect yet with endocrinology. His SOB is slightly better, hoarseness and cough persists. He denies any vomiting, diarrhea, skin issues. Still doing his day to day activities. Overall denies any issues with the Keytruda itself.  His R hand numbness has resolved.     Current Outpatient Medications   Medication Sig Dispense Refill     celecoxib (CELEBREX) 200 MG capsule Take 1 capsule (200 mg) by mouth 2 times daily . Note this is a new a strength! Only one capsule at a time! 60 capsule 3     cholecalciferol (VITAMIN D-1000 MAX ST) 1000 units TABS Take 1,000 Units by mouth daily        guaiFENesin-codeine (GUAIFENESIN AC) 100-10 MG/5ML  syrup Take 10 mLs by mouth every 4 hours as needed for cough 118 mL 0     hydrocortisone (CORTEF) 10 MG tablet Take 15 mg in the morning and 10 mg in the afternoon       HYDROmorphone (DILAUDID) 2 MG tablet Take 0.5-1 tablets (1-2 mg) by mouth 3 times daily as needed for severe pain (Patient not taking: Reported on 9/1/2021) 14 tablet 0     levothyroxine (SYNTHROID/LEVOTHROID) 75 MCG tablet Take 75 mcg by mouth every morning        metroNIDAZOLE (METROGEL) 0.75 % external gel Apply topically 2 times daily 45 g 11     omeprazole (PRILOSEC) 20 MG DR capsule Take 20 mg by mouth       prochlorperazine (COMPAZINE) 10 MG tablet Take 1 tablet (10 mg) by mouth every 6 hours as needed for nausea or vomiting 30 tablet 3     rosuvastatin (CRESTOR) 40 MG tablet Take 1 tablet (40 mg) by mouth daily 90 tablet 1     SUMAtriptan (IMITREX) 50 MG tablet Take 1 tablet (50 mg) by mouth at onset of headache for migraine May repeat in 2 hours. Max 4 tablets/24 hours. 6 tablet 1     triamcinolone (KENALOG) 0.1 % external cream Apply topically 2 times daily        Physical Examination:  There were no vitals taken for this visit.  Wt Readings from Last 10 Encounters:   09/17/21 73 kg (161 lb)   09/01/21 72.6 kg (160 lb)   08/11/21 72.6 kg (160 lb)   08/02/21 72.8 kg (160 lb 9.6 oz)   07/12/21 73 kg (161 lb)   07/01/21 73 kg (161 lb)   06/23/21 73.4 kg (161 lb 12.8 oz)   06/21/21 74.7 kg (164 lb 11.2 oz)   06/21/21 71.7 kg (158 lb)   06/08/21 71.8 kg (158 lb 6.4 oz)     Video physical exam  General: Patient appears well in no acute distress.   Skin: No visualized rash or lesions on visualized skin  Eyes: EOMI, no erythema, sclera icterus or discharge noted  Resp: Appears to be breathing comfortably without accessory muscle usage, speaking in full sentences, no cough  MSK: Appears to have normal range of motion based on visualized movements  Neurologic: No apparent tremors, facial movements symmetric  Psych: affect normal, alert and  oriented    The rest of a comprehensive physical examination is deferred due to PHE (public health emergency) video restrictions    Laboratory Data:    To be done with infusion today    Assessment and Plan:  1. 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 High PD-L1. Started on Keytruda 6/21/21 and has received 4 cycles. CT 8/2/21 with positive response to treatment. He is having dyspnea related to talking but have r/o pneumonitis previously. He is overall doing well on treatment.     As long as labs are WNL today will go ahead with cycle 6.     Plan to continue treatment every 3 weeks and repeat CT imaging end of October-scheduled in 2 weeks.    Did put in orders for extra 1L IVF with treatment today due to generally feeling poorly after multiple scans/IV contrast last week.     2. GI  Diarrhea: Possibly related to Keytruda, resolved. Continue Pepto PRN.     Dyspepsia: Improved with Omeprazole daily. Can take Tums/Pepto Bismol PRN. 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 Cal Saba endocrine. Requested referral to our endocrinology-ordered.      TSH to be checked today, continue Synthroid 75mcg daily.      4. Derm  Rosacea: Long standing issue, now just continue Metrogel PRN which is helping.     5. MSK  Left shoulder/back/chest wall pain 2/2 tumor, following with palliative. Continue topical Bengay and PO Celebrex BID. Pain worsened when cut back so no changes to meds. Will assess response to treatment on upcoming CT.      6. ENT  Hoarseness: Thought 2/2 mediastinal mass vs irritation from prior biopsy. Following with ENT, s/p vocal cord infection 7/1/21. Will see our voice clinic in Dec due to ongoing issues.      7. Pulm/Cards  Dyspnea with talking, prior work-up with CT PE negative for PE, infection, pneumonitis; troponin negative; COVID negative. Sating well on  RA. Agree with speech pathology thoughts on laryngeal dysfunction. Symptoms stable.      Continue Guaifenesin-Coedine PRN for cough, slightly improved.    Saw cardiology, echo WNL. Has mild CAD, recommended to start on statin. Will monitor for arthralgias and LFT elevation.     40 minutes spent on the date of the encounter doing chart review, patient visit and documentation     Maynor Rios PA-C  [facility]  [address] [place] [address] [id number] [phone]

## 2021-10-18 ENCOUNTER — APPOINTMENT (OUTPATIENT)
Dept: LAB | Facility: CLINIC | Age: 71
End: 2021-10-18
Payer: MEDICARE

## 2021-10-18 ENCOUNTER — HOSPITAL ENCOUNTER (OUTPATIENT)
Dept: CT IMAGING | Facility: CLINIC | Age: 71
Discharge: HOME | End: 2021-10-18
Attending: PHYSICIAN ASSISTANT | Admitting: PHYSICIAN ASSISTANT
Payer: MEDICARE

## 2021-10-18 DIAGNOSIS — C49.9 SPINDLE CELL SARCOMA (H): ICD-10-CM

## 2021-10-18 DIAGNOSIS — C45.9 MESOTHELIOMA, MALIGNANT (H): ICD-10-CM

## 2021-10-18 LAB
CREAT BLD-MCNC: 0.7 MG/DL (ref 0.7–1.3)
GFR SERPL CREATININE-BSD FRML MDRD: >60 ML/MIN/1.73M2

## 2021-10-18 PROCEDURE — 250N000009 HC RX 250: Performed by: PHYSICIAN ASSISTANT

## 2021-10-18 PROCEDURE — 71260 CT THORAX DX C+: CPT | Mod: MG

## 2021-10-18 PROCEDURE — 82565 ASSAY OF CREATININE: CPT

## 2021-10-18 PROCEDURE — 250N000011 HC RX IP 250 OP 636: Performed by: PHYSICIAN ASSISTANT

## 2021-10-18 RX ORDER — IOPAMIDOL 755 MG/ML
79 INJECTION, SOLUTION INTRAVASCULAR ONCE
Status: COMPLETED | OUTPATIENT
Start: 2021-10-18 | End: 2021-10-18

## 2021-10-18 RX ADMIN — SODIUM CHLORIDE 60 ML: 9 INJECTION, SOLUTION INTRAVENOUS at 13:43

## 2021-10-18 RX ADMIN — IOPAMIDOL 79 ML: 755 INJECTION, SOLUTION INTRAVENOUS at 13:42

## 2021-10-20 ENCOUNTER — VIRTUAL VISIT (OUTPATIENT)
Dept: ONCOLOGY | Facility: CLINIC | Age: 71
End: 2021-10-20
Attending: INTERNAL MEDICINE
Payer: MEDICARE

## 2021-10-20 DIAGNOSIS — C49.9 SPINDLE CELL SARCOMA (H): Primary | ICD-10-CM

## 2021-10-20 DIAGNOSIS — D48.7 NEOPLASM OF UNCERTAIN BEHAVIOR OF OTHER SPECIFIED SITES: ICD-10-CM

## 2021-10-20 DIAGNOSIS — C45.9 MESOTHELIOMA, MALIGNANT (H): Primary | ICD-10-CM

## 2021-10-20 DIAGNOSIS — C49.9 SPINDLE CELL SARCOMA (H): ICD-10-CM

## 2021-10-20 PROCEDURE — 999N001193 HC VIDEO/TELEPHONE VISIT; NO CHARGE

## 2021-10-20 PROCEDURE — 99215 OFFICE O/P EST HI 40 MIN: CPT | Mod: 95 | Performed by: INTERNAL MEDICINE

## 2021-10-20 PROCEDURE — G0463 HOSPITAL OUTPT CLINIC VISIT: HCPCS | Mod: PN,RTG | Performed by: INTERNAL MEDICINE

## 2021-10-20 RX ORDER — HEPARIN SODIUM,PORCINE 10 UNIT/ML
5 VIAL (ML) INTRAVENOUS
Status: CANCELLED | OUTPATIENT
Start: 2021-10-25

## 2021-10-20 RX ORDER — PALONOSETRON 0.05 MG/ML
0.25 INJECTION, SOLUTION INTRAVENOUS ONCE
Status: CANCELLED
Start: 2021-10-25

## 2021-10-20 RX ORDER — EPINEPHRINE 1 MG/ML
0.3 INJECTION, SOLUTION INTRAMUSCULAR; SUBCUTANEOUS EVERY 5 MIN PRN
Status: CANCELLED | OUTPATIENT
Start: 2021-10-25

## 2021-10-20 RX ORDER — HEPARIN SODIUM (PORCINE) LOCK FLUSH IV SOLN 100 UNIT/ML 100 UNIT/ML
5 SOLUTION INTRAVENOUS
Status: CANCELLED | OUTPATIENT
Start: 2021-10-31

## 2021-10-20 RX ORDER — LORAZEPAM 2 MG/ML
0.5 INJECTION INTRAMUSCULAR EVERY 4 HOURS PRN
Status: CANCELLED
Start: 2021-10-25

## 2021-10-20 RX ORDER — NALOXONE HYDROCHLORIDE 0.4 MG/ML
0.2 INJECTION, SOLUTION INTRAMUSCULAR; INTRAVENOUS; SUBCUTANEOUS
Status: CANCELLED | OUTPATIENT
Start: 2021-10-25

## 2021-10-20 RX ORDER — METHYLPREDNISOLONE SODIUM SUCCINATE 125 MG/2ML
125 INJECTION, POWDER, LYOPHILIZED, FOR SOLUTION INTRAMUSCULAR; INTRAVENOUS
Status: CANCELLED
Start: 2021-10-25

## 2021-10-20 RX ORDER — HEPARIN SODIUM,PORCINE 10 UNIT/ML
5 VIAL (ML) INTRAVENOUS
Status: CANCELLED | OUTPATIENT
Start: 2021-10-31

## 2021-10-20 RX ORDER — DIPHENHYDRAMINE HYDROCHLORIDE 50 MG/ML
50 INJECTION INTRAMUSCULAR; INTRAVENOUS
Status: CANCELLED
Start: 2021-10-25

## 2021-10-20 RX ORDER — MEPERIDINE HYDROCHLORIDE 25 MG/ML
25 INJECTION INTRAMUSCULAR; INTRAVENOUS; SUBCUTANEOUS EVERY 30 MIN PRN
Status: CANCELLED | OUTPATIENT
Start: 2021-10-25

## 2021-10-20 RX ORDER — ALBUTEROL SULFATE 0.83 MG/ML
2.5 SOLUTION RESPIRATORY (INHALATION)
Status: CANCELLED | OUTPATIENT
Start: 2021-10-25

## 2021-10-20 RX ORDER — HEPARIN SODIUM (PORCINE) LOCK FLUSH IV SOLN 100 UNIT/ML 100 UNIT/ML
5 SOLUTION INTRAVENOUS
Status: CANCELLED | OUTPATIENT
Start: 2021-10-25

## 2021-10-20 RX ORDER — ALBUTEROL SULFATE 90 UG/1
1-2 AEROSOL, METERED RESPIRATORY (INHALATION)
Status: CANCELLED
Start: 2021-10-25

## 2021-10-20 NOTE — LETTER
10/20/2021         RE: Ifrah Huitron  [address] [place] [address]        Dear Colleague,    Thank you for referring your patient, Ifrah Huitron, to the [facility]. Please see a copy of my visit note below.    Ifrah is a 71 year old who is being evaluated via a billable video visit.      How would you like to obtain your AVS? MyChart  If the video visit is dropped, the invitation should be resent by: Text to cell phone: [phone]   Will anyone else be joining your video visit? Pt's wife, with pt.      Video Start Timabout 308  Video-Visit Details    Type of service:  Video Visit    Video End Timeabout 340?    Originating Location (pt. Locationcar    Distant Location (provider location):  [facility]     [facility]          10-20-21     I saw Ifrah Huitron for f/u of a a tumor metastatic to the lung.      Background  His history is somewhat complicated but in brief he began developing some low left back pain about the fall winter 6928-2033.  This is gradually increased in size and he has become more tired; this led to imaging showing several lung nodules and a biopsy showing a malignant tumor.   -  biopsy showed a malignant tumor without a specific other than the descriptive diagnosis.  Notably it is a strong PD-L1 expresser.    Specimen #: [id number]   Collected: 5/20/2021   FINAL DIAGNOSIS:   Left pleural mass biopsy, CT guided:   - Malignant spindle cell neoplasm with necrosis (see comment)     COMMENT:   Special stains were performed with appropriate controls.  The tumor cells   are diffusely positive for CK   AE1/AE3 and CK MIRZA.  There is also focal to patchy staining for D2-40,   CD68, and WT-1.  INI1 is retained in   the tumor cells.  The Ki-67 labeling index is approximately 70%.  The   tumor cells show high PD-L1 expression   (TPS 90%).  No expression of P63, calretinin, DOG1, ALK1, , CK 5/6,   STAT6, SMA,  SALL4, desmin, S100,   Myogenin, CD21, and CD23 is identified in the tumor cells.  These findings    together with tumor morphology and   location are most compatible with malignant sarcomatoid mesothelioma.  The    differential diagnosis includes   sarcomatoid carcinoma and sarcomas such as synovial sarcoma and   histiocytic sarcoma.  Additional tests are   pending and may help to classify this tumor.   -    Interval history    He started keytruda 6-21-21.    His next infusion is next Monday.    He thinks the pain has increased on the left side and L shoulder; shoulder interferes w sleep; tommie blankenship helps. He is on celebrex bid.  He thinks pain control at night could be better.    Taste is off.     He has still been golfing 1-2/week and working on his garage though his activity level is clearly decreased.      WALKER is less walking up stairs.  He goes up 2 floors but gets tired.    The hoarseness which is not improved and he will see and ENT person in Dec.    The nonproductive cough is a bit better; still takes the cough med.      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      He controls GERD with Prilosec daily and prn tums.    Mood is not too bad but sometimes down.  Skin dry on hands.    Overall he is not doing as well as July 4. The pain went away initially w keytruda but now its back.       -  Background PMH, FH, SH  His past medical history is notable for hypophyasectomy in 2010 and he is on replacement T4, cortisol.  He takes minocycline daily for rosacea and feels that as needed for GERD he has had a kidney stone in the past and has some DJD, and a hernia repair.  He describes an allergy to penicillin manifest as hives.  Family history is not particularly remarkable although his mother had lupus and his father had ALS and his sister has rheumatoid arthritis.  Social history reveals he is  with 5 adult children, is a never smoker and does not abuse alcohol or other drugs.  He works as a real estate  agent and has several hobbies building a garage.  -     On exam he appeared comfortable with normal affect.  HEENT full EOMs, ptosis R  NECK no obvious goiter or mass  CHEST no respiratory distress  NEURO normal mentation and speech is  PSYCH Good mood     -  10-4 labs showed creat 0.83, LFT OK, cbC OK    -  Initial imaging on showed a response.    I reviewed his images of 10-18-21 showing a further response in the chest but the mass near the spleen is larger although possibly somewhat necrotic centrally;it does invade the spleen.    Formal read:  CT-CAP  IMPRESSION:  1.  Interval increase in size in the left upper quadrant abdominal  mass. This appears to invade the spleen and hemidiaphragm.  2.  Interval decrease in size in the anterior mediastinal mass and  soft tissue thickening adjacent to the aortic arch.  3.  Interval decrease in size in the soft tissue mass in the left  anterior pleural space.  4.  Slight interval decrease in size in the left pleural effusion.     -  We discussed the situation including the lack of a truly specific diagnosis.  We discussed we could treat this like a sarcoma but the high PD-L1 expression made it attractive to consider a PD-L1 agent.      There is a continued nice response in the lung but the mass near the spleen is larger and he is more symptomatic.    We discussed the use of second line for Doxil ifosfamide and the typical toxicities of that and will proceed with that at present.    We could use other agents such as Gemzar or pazopanib among others.    Pain control via palliative.      His other diagnoses include panhypopituitarism and will be continuing those medications, the rosacea; he will continue minocycline, the GERD; he will continue as needed Prilosec.  The diagnoses of DJD, history of kidney stones and penicillin allergy are noted.       We had inadequate material for foundation 1.      All their question addressed and will call if others arise.     Luke LEI  TALHA Wolff.  Professor  Hematology, Oncology and Transplantation         Again, thank you for allowing me to participate in the care of your patient.        Sincerely,        Luke Wolff MD

## 2021-10-20 NOTE — PROGRESS NOTES
Ifrah is a 71 year old who is being evaluated via a billable video visit.      How would you like to obtain your AVS? [employer]  If the video visit is dropped, the invitation should be resent by: Text to cell phone: [phone]   Will anyone else be joining your video visit? Pt's wife, with pt.      Video Start Timabout 308  Video-Visit Details    Type of service:  Video Visit    Video End Timeabout 340?    Originating Location (pt. Locationcar    Distant Location (provider location):  [facility]     Platform used for [facility] VisitL[facility]Wood [facility] Center          10-20-21     I saw Ifrah Huitron for f/u of a a tumor metastatic to the lung.      Background  His history is somewhat complicated but in brief he began developing some low left back pain about the fall winter 6954-4610.  This is gradually increased in size and he has become more tired; this led to imaging showing several lung nodules and a biopsy showing a malignant tumor.   -  biopsy showed a malignant tumor without a specific other than the descriptive diagnosis.  Notably it is a strong PD-L1 expresser.    Specimen #: [id number]   Collected: 5/20/2021   FINAL DIAGNOSIS:   Left pleural mass biopsy, CT guided:   - Malignant spindle cell neoplasm with necrosis (see comment)     COMMENT:   Special stains were performed with appropriate controls.  The tumor cells   are diffusely positive for CK   AE1/AE3 and CK MIRZA.  There is also focal to patchy staining for D2-40,   CD68, and WT-1.  INI1 is retained in   the tumor cells.  The Ki-67 labeling index is approximately 70%.  The   tumor cells show high PD-L1 expression   (TPS 90%).  No expression of P63, calretinin, DOG1, ALK1, , CK 5/6,   STAT6, SMA, SALL4, desmin, S100,   Myogenin, CD21, and CD23 is identified in the tumor cells.  These findings    together with tumor morphology and   location are most compatible with malignant sarcomatoid mesothelioma.  The    differential diagnosis includes    sarcomatoid carcinoma and sarcomas such as synovial sarcoma and   histiocytic sarcoma.  Additional tests are   pending and may help to classify this tumor.   -    Interval history    He started keytruda 6-21-21.    His next infusion is next Monday.    He thinks the pain has increased on the left side and L shoulder; shoulder interferes w sleep; tommie blankenship helps. He is on celebrex bid.  He thinks pain control at night could be better.    Taste is off.     He has still been golfing 1-2/week and working on his garage though his activity level is clearly decreased.      WALKER is less walking up stairs.  He goes up 2 floors but gets tired.    The hoarseness which is not improved and he will see and ENT person in Dec.    The nonproductive cough is a bit better; still takes the cough med.      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      He controls GERD with Prilosec daily and prn tums.    Mood is not too bad but sometimes down.  Skin dry on hands.    Overall he is not doing as well as July 4. The pain went away initially w keytruda but now its back.       -  Background PMH, FH, SH  His past medical history is notable for hypophyasectomy in 2010 and he is on replacement T4, cortisol.  He takes minocycline daily for rosacea and feels that as needed for GERD he has had a kidney stone in the past and has some DJD, and a hernia repair.  He describes an allergy to penicillin manifest as hives.  Family history is not particularly remarkable although his mother had lupus and his father had ALS and his sister has rheumatoid arthritis.  Social history reveals he is  with 5 adult children, is a never smoker and does not abuse alcohol or other drugs.  He works as a  and has several hobbies building a garage.  -     On exam he appeared comfortable with normal affect.  HEENT full EOMs, ptosis R  NECK no obvious goiter or mass  CHEST no respiratory distress  NEURO normal mentation and speech is  PSYCH Good  mood     -  10-4 labs showed creat 0.83, LFT OK, cbC OK    -  Initial imaging on showed a response.    I reviewed his images of 10-18-21 showing a further response in the chest but the mass near the spleen is larger although possibly somewhat necrotic centrally;it does invade the spleen.    Formal read:  CT-CAP  IMPRESSION:  1.  Interval increase in size in the left upper quadrant abdominal  mass. This appears to invade the spleen and hemidiaphragm.  2.  Interval decrease in size in the anterior mediastinal mass and  soft tissue thickening adjacent to the aortic arch.  3.  Interval decrease in size in the soft tissue mass in the left  anterior pleural space.  4.  Slight interval decrease in size in the left pleural effusion.     -  We discussed the situation including the lack of a truly specific diagnosis.  We discussed we could treat this like a sarcoma but the high PD-L1 expression made it attractive to consider a PD-L1 agent.      There is a continued nice response in the lung but the mass near the spleen is larger and he is more symptomatic.    We discussed the use of second line for Doxil ifosfamide and the typical toxicities of that and will proceed with that at present.    We could use other agents such as Gemzar or pazopanib among others.    Pain control via palliative.      His other diagnoses include panhypopituitarism and will be continuing those medications, the rosacea; he will continue minocycline, the GERD; he will continue as needed Prilosec.  The diagnoses of DJD, history of kidney stones and penicillin allergy are noted.       We had inadequate material for foundation 1.      All their question addressed and will call if others arise.     Luke Wolff M.D.  Professor  Hematology, Oncology and Transplantation

## 2021-10-21 DIAGNOSIS — T45.1X5A CHEMOTHERAPY-INDUCED NEUTROPENIA (H): ICD-10-CM

## 2021-10-21 DIAGNOSIS — C49.9 SPINDLE CELL SARCOMA (H): Primary | ICD-10-CM

## 2021-10-21 DIAGNOSIS — C45.9 MESOTHELIOMA, MALIGNANT (H): ICD-10-CM

## 2021-10-21 DIAGNOSIS — D70.1 CHEMOTHERAPY-INDUCED NEUTROPENIA (H): ICD-10-CM

## 2021-10-21 RX ORDER — ALBUTEROL SULFATE 0.83 MG/ML
2.5 SOLUTION RESPIRATORY (INHALATION)
Status: CANCELLED | OUTPATIENT
Start: 2021-10-25

## 2021-10-21 RX ORDER — ONDANSETRON 8 MG/1
8 TABLET, FILM COATED ORAL EVERY 8 HOURS
Status: CANCELLED
Start: 2021-10-25

## 2021-10-21 RX ORDER — DEXTROSE MONOHYDRATE 50 MG/ML
10-20 INJECTION, SOLUTION INTRAVENOUS
Status: CANCELLED | OUTPATIENT
Start: 2021-11-01

## 2021-10-21 RX ORDER — DIPHENHYDRAMINE HYDROCHLORIDE 50 MG/ML
50 INJECTION INTRAMUSCULAR; INTRAVENOUS
Status: CANCELLED
Start: 2021-10-25

## 2021-10-21 RX ORDER — ALBUTEROL SULFATE 90 UG/1
1-2 AEROSOL, METERED RESPIRATORY (INHALATION)
Status: CANCELLED
Start: 2021-10-25

## 2021-10-21 RX ORDER — NALOXONE HYDROCHLORIDE 0.4 MG/ML
0.2 INJECTION, SOLUTION INTRAMUSCULAR; INTRAVENOUS; SUBCUTANEOUS
Status: CANCELLED | OUTPATIENT
Start: 2021-10-25

## 2021-10-21 RX ORDER — METHYLPREDNISOLONE SODIUM SUCCINATE 125 MG/2ML
125 INJECTION, POWDER, LYOPHILIZED, FOR SOLUTION INTRAMUSCULAR; INTRAVENOUS
Status: CANCELLED
Start: 2021-10-25

## 2021-10-21 RX ORDER — EPINEPHRINE 1 MG/ML
0.3 INJECTION, SOLUTION INTRAMUSCULAR; SUBCUTANEOUS EVERY 5 MIN PRN
Status: CANCELLED | OUTPATIENT
Start: 2021-10-25

## 2021-10-21 RX ORDER — PROCHLORPERAZINE MALEATE 5 MG
5 TABLET ORAL EVERY 6 HOURS PRN
Status: CANCELLED
Start: 2021-10-25

## 2021-10-21 RX ORDER — MEPERIDINE HYDROCHLORIDE 25 MG/ML
25 INJECTION INTRAMUSCULAR; INTRAVENOUS; SUBCUTANEOUS EVERY 30 MIN PRN
Status: CANCELLED | OUTPATIENT
Start: 2021-10-25

## 2021-10-22 ENCOUNTER — TELEPHONE (OUTPATIENT)
Dept: ONCOLOGY | Facility: CLINIC | Age: 71
End: 2021-10-22

## 2021-10-22 NOTE — TELEPHONE ENCOUNTER
10-22-21    I talked w him today to clarify Rx options.  He decided to probably do in pt Rx    T>11 min    Luke Wolff M.D.  Professor  Hematology, Oncology and Transplantation

## 2021-10-26 ENCOUNTER — HOSPITAL ENCOUNTER (INPATIENT)
Facility: CLINIC | Age: 71
LOS: 7 days | Discharge: HOME | DRG: 847 | End: 2021-11-02
Attending: INTERNAL MEDICINE | Admitting: INTERNAL MEDICINE
Payer: MEDICARE

## 2021-10-26 DIAGNOSIS — C49.9 SPINDLE CELL SARCOMA (H): Primary | ICD-10-CM

## 2021-10-26 DIAGNOSIS — T45.1X5A CHEMOTHERAPY-INDUCED NAUSEA: ICD-10-CM

## 2021-10-26 DIAGNOSIS — C45.9 MESOTHELIOMA, MALIGNANT (H): ICD-10-CM

## 2021-10-26 DIAGNOSIS — R11.0 CHEMOTHERAPY-INDUCED NAUSEA: ICD-10-CM

## 2021-10-26 DIAGNOSIS — G43.109 MIGRAINE WITH AURA AND WITHOUT STATUS MIGRAINOSUS, NOT INTRACTABLE: ICD-10-CM

## 2021-10-26 DIAGNOSIS — E83.39 HYPOPHOSPHATEMIA: ICD-10-CM

## 2021-10-26 LAB
ALBUMIN SERPL-MCNC: 3.3 G/DL (ref 3.4–5)
ALBUMIN SERPL-MCNC: 3.7 G/DL (ref 3.4–5)
ALP SERPL-CCNC: 160 U/L (ref 40–150)
ALP SERPL-CCNC: 169 U/L (ref 40–150)
ALT SERPL W P-5'-P-CCNC: 44 U/L (ref 0–70)
ALT SERPL W P-5'-P-CCNC: 44 U/L (ref 0–70)
ANION GAP SERPL CALCULATED.3IONS-SCNC: 3 MMOL/L (ref 3–14)
ANION GAP SERPL CALCULATED.3IONS-SCNC: 4 MMOL/L (ref 3–14)
AST SERPL W P-5'-P-CCNC: 32 U/L (ref 0–45)
AST SERPL W P-5'-P-CCNC: 36 U/L (ref 0–45)
BASOPHILS # BLD AUTO: 0.1 10E3/UL (ref 0–0.2)
BASOPHILS # BLD AUTO: 0.1 10E3/UL (ref 0–0.2)
BASOPHILS NFR BLD AUTO: 1 %
BASOPHILS NFR BLD AUTO: 1 %
BILIRUB SERPL-MCNC: 0.3 MG/DL (ref 0.2–1.3)
BILIRUB SERPL-MCNC: 0.4 MG/DL (ref 0.2–1.3)
BUN SERPL-MCNC: 16 MG/DL (ref 7–30)
BUN SERPL-MCNC: 16 MG/DL (ref 7–30)
CALCIUM SERPL-MCNC: 9.1 MG/DL (ref 8.5–10.1)
CALCIUM SERPL-MCNC: 9.7 MG/DL (ref 8.5–10.1)
CHLORIDE BLD-SCNC: 108 MMOL/L (ref 94–109)
CHLORIDE BLD-SCNC: 109 MMOL/L (ref 94–109)
CO2 SERPL-SCNC: 27 MMOL/L (ref 20–32)
CO2 SERPL-SCNC: 28 MMOL/L (ref 20–32)
CREAT SERPL-MCNC: 0.73 MG/DL (ref 0.66–1.25)
CREAT SERPL-MCNC: 0.78 MG/DL (ref 0.66–1.25)
EOSINOPHIL # BLD AUTO: 0.3 10E3/UL (ref 0–0.7)
EOSINOPHIL # BLD AUTO: 0.5 10E3/UL (ref 0–0.7)
EOSINOPHIL NFR BLD AUTO: 3 %
EOSINOPHIL NFR BLD AUTO: 4 %
ERYTHROCYTE [DISTWIDTH] IN BLOOD BY AUTOMATED COUNT: 13.3 % (ref 10–15)
ERYTHROCYTE [DISTWIDTH] IN BLOOD BY AUTOMATED COUNT: 13.5 % (ref 10–15)
GFR SERPL CREATININE-BSD FRML MDRD: 90 ML/MIN/1.73M2
GFR SERPL CREATININE-BSD FRML MDRD: >90 ML/MIN/1.73M2
GLUCOSE BLD-MCNC: 114 MG/DL (ref 70–99)
GLUCOSE BLD-MCNC: 126 MG/DL (ref 70–99)
HCT VFR BLD AUTO: 42.5 % (ref 40–53)
HCT VFR BLD AUTO: 43.4 % (ref 40–53)
HGB BLD-MCNC: 13.6 G/DL (ref 13.3–17.7)
HGB BLD-MCNC: 14.1 G/DL (ref 13.3–17.7)
IMM GRANULOCYTES # BLD: 0.1 10E3/UL
IMM GRANULOCYTES # BLD: 0.1 10E3/UL
IMM GRANULOCYTES NFR BLD: 1 %
IMM GRANULOCYTES NFR BLD: 1 %
LYMPHOCYTES # BLD AUTO: 1.8 10E3/UL (ref 0.8–5.3)
LYMPHOCYTES # BLD AUTO: 1.9 10E3/UL (ref 0.8–5.3)
LYMPHOCYTES NFR BLD AUTO: 15 %
LYMPHOCYTES NFR BLD AUTO: 17 %
MAGNESIUM SERPL-MCNC: 2.4 MG/DL (ref 1.6–2.3)
MCH RBC QN AUTO: 27.5 PG (ref 26.5–33)
MCH RBC QN AUTO: 27.9 PG (ref 26.5–33)
MCHC RBC AUTO-ENTMCNC: 32 G/DL (ref 31.5–36.5)
MCHC RBC AUTO-ENTMCNC: 32.5 G/DL (ref 31.5–36.5)
MCV RBC AUTO: 86 FL (ref 78–100)
MCV RBC AUTO: 86 FL (ref 78–100)
MONOCYTES # BLD AUTO: 0.9 10E3/UL (ref 0–1.3)
MONOCYTES # BLD AUTO: 1.1 10E3/UL (ref 0–1.3)
MONOCYTES NFR BLD AUTO: 8 %
MONOCYTES NFR BLD AUTO: 9 %
NEUTROPHILS # BLD AUTO: 7.8 10E3/UL (ref 1.6–8.3)
NEUTROPHILS # BLD AUTO: 8.8 10E3/UL (ref 1.6–8.3)
NEUTROPHILS NFR BLD AUTO: 70 %
NEUTROPHILS NFR BLD AUTO: 70 %
NRBC # BLD AUTO: 0 10E3/UL
NRBC # BLD AUTO: 0 10E3/UL
NRBC BLD AUTO-RTO: 0 /100
NRBC BLD AUTO-RTO: 0 /100
PHOSPHATE SERPL-MCNC: 2.6 MG/DL (ref 2.5–4.5)
PLATELET # BLD AUTO: 247 10E3/UL (ref 150–450)
PLATELET # BLD AUTO: 268 10E3/UL (ref 150–450)
POTASSIUM BLD-SCNC: 4 MMOL/L (ref 3.4–5.3)
POTASSIUM BLD-SCNC: 4.1 MMOL/L (ref 3.4–5.3)
PROT SERPL-MCNC: 7 G/DL (ref 6.8–8.8)
PROT SERPL-MCNC: 7.6 G/DL (ref 6.8–8.8)
RBC # BLD AUTO: 4.95 10E6/UL (ref 4.4–5.9)
RBC # BLD AUTO: 5.05 10E6/UL (ref 4.4–5.9)
SARS-COV-2 RNA RESP QL NAA+PROBE: NEGATIVE
SODIUM SERPL-SCNC: 139 MMOL/L (ref 133–144)
SODIUM SERPL-SCNC: 140 MMOL/L (ref 133–144)
WBC # BLD AUTO: 11 10E3/UL (ref 4–11)
WBC # BLD AUTO: 12.4 10E3/UL (ref 4–11)

## 2021-10-26 PROCEDURE — 250N000013 HC RX MED GY IP 250 OP 250 PS 637: Performed by: INTERNAL MEDICINE

## 2021-10-26 PROCEDURE — 84155 ASSAY OF PROTEIN SERUM: CPT | Performed by: INTERNAL MEDICINE

## 2021-10-26 PROCEDURE — 258N000003 HC RX IP 258 OP 636: Performed by: INTERNAL MEDICINE

## 2021-10-26 PROCEDURE — 84450 TRANSFERASE (AST) (SGOT): CPT | Performed by: INTERNAL MEDICINE

## 2021-10-26 PROCEDURE — 272N000201 ZZ HC ADHESIVE SKIN CLOSURE, DERMABOND

## 2021-10-26 PROCEDURE — U0003 INFECTIOUS AGENT DETECTION BY NUCLEIC ACID (DNA OR RNA); SEVERE ACUTE RESPIRATORY SYNDROME CORONAVIRUS 2 (SARS-COV-2) (CORONAVIRUS DISEASE [COVID-19]), AMPLIFIED PROBE TECHNIQUE, MAKING USE OF HIGH THROUGHPUT TECHNOLOGIES AS DESCRIBED BY CMS-2020-01-R: HCPCS | Performed by: PHYSICIAN ASSISTANT

## 2021-10-26 PROCEDURE — 36415 COLL VENOUS BLD VENIPUNCTURE: CPT | Performed by: PHYSICIAN ASSISTANT

## 2021-10-26 PROCEDURE — 36592 COLLECT BLOOD FROM PICC: CPT | Performed by: INTERNAL MEDICINE

## 2021-10-26 PROCEDURE — 36569 INSJ PICC 5 YR+ W/O IMAGING: CPT

## 2021-10-26 PROCEDURE — 84100 ASSAY OF PHOSPHORUS: CPT | Performed by: PHYSICIAN ASSISTANT

## 2021-10-26 PROCEDURE — 250N000009 HC RX 250: Performed by: PHYSICIAN ASSISTANT

## 2021-10-26 PROCEDURE — 85025 COMPLETE CBC W/AUTO DIFF WBC: CPT | Performed by: INTERNAL MEDICINE

## 2021-10-26 PROCEDURE — 250N000013 HC RX MED GY IP 250 OP 250 PS 637: Performed by: PHYSICIAN ASSISTANT

## 2021-10-26 PROCEDURE — 272N000451 HC KIT SHRLOCK 5FR POWER PICC DOUBLE LUMEN

## 2021-10-26 PROCEDURE — 99223 1ST HOSP IP/OBS HIGH 75: CPT | Mod: AI | Performed by: INTERNAL MEDICINE

## 2021-10-26 PROCEDURE — 120N000002 HC R&B MED SURG/OB UMMC

## 2021-10-26 PROCEDURE — 250N000011 HC RX IP 250 OP 636: Performed by: PHYSICIAN ASSISTANT

## 2021-10-26 PROCEDURE — 80053 COMPREHEN METABOLIC PANEL: CPT | Performed by: PHYSICIAN ASSISTANT

## 2021-10-26 PROCEDURE — 250N000013 HC RX MED GY IP 250 OP 250 PS 637: Performed by: STUDENT IN AN ORGANIZED HEALTH CARE EDUCATION/TRAINING PROGRAM

## 2021-10-26 PROCEDURE — 250N000011 HC RX IP 250 OP 636: Performed by: INTERNAL MEDICINE

## 2021-10-26 PROCEDURE — 83735 ASSAY OF MAGNESIUM: CPT | Performed by: PHYSICIAN ASSISTANT

## 2021-10-26 PROCEDURE — 3E04305 INTRODUCTION OF OTHER ANTINEOPLASTIC INTO CENTRAL VEIN, PERCUTANEOUS APPROACH: ICD-10-PCS | Performed by: INTERNAL MEDICINE

## 2021-10-26 PROCEDURE — 85025 COMPLETE CBC W/AUTO DIFF WBC: CPT | Performed by: PHYSICIAN ASSISTANT

## 2021-10-26 RX ORDER — AMOXICILLIN 250 MG
2 CAPSULE ORAL 2 TIMES DAILY PRN
Status: DISCONTINUED | OUTPATIENT
Start: 2021-10-26 | End: 2021-11-02 | Stop reason: HOSPADM

## 2021-10-26 RX ORDER — DIPHENHYDRAMINE HYDROCHLORIDE 50 MG/ML
50 INJECTION INTRAMUSCULAR; INTRAVENOUS
Status: DISCONTINUED | OUTPATIENT
Start: 2021-10-26 | End: 2021-11-02 | Stop reason: HOSPADM

## 2021-10-26 RX ORDER — CALCIUM CARBONATE 500 MG/1
1 TABLET, CHEWABLE ORAL DAILY
Status: ON HOLD | COMMUNITY
End: 2021-12-08

## 2021-10-26 RX ORDER — CALCIUM CARBONATE 500 MG/1
500-1000 TABLET, CHEWABLE ORAL 3 TIMES DAILY PRN
Status: DISCONTINUED | OUTPATIENT
Start: 2021-10-26 | End: 2021-11-02 | Stop reason: HOSPADM

## 2021-10-26 RX ORDER — POLYETHYLENE GLYCOL 3350 17 G/17G
17 POWDER, FOR SOLUTION ORAL DAILY PRN
Status: DISCONTINUED | OUTPATIENT
Start: 2021-10-26 | End: 2021-11-02 | Stop reason: HOSPADM

## 2021-10-26 RX ORDER — PANTOPRAZOLE SODIUM 40 MG/1
40 TABLET, DELAYED RELEASE ORAL
Status: DISCONTINUED | OUTPATIENT
Start: 2021-10-27 | End: 2021-11-02 | Stop reason: HOSPADM

## 2021-10-26 RX ORDER — FENTANYL CITRATE 50 UG/ML
25-50 INJECTION, SOLUTION INTRAMUSCULAR; INTRAVENOUS
Status: DISCONTINUED | OUTPATIENT
Start: 2021-10-26 | End: 2021-10-26

## 2021-10-26 RX ORDER — NALOXONE HYDROCHLORIDE 0.4 MG/ML
0.4 INJECTION, SOLUTION INTRAMUSCULAR; INTRAVENOUS; SUBCUTANEOUS
Status: DISCONTINUED | OUTPATIENT
Start: 2021-10-26 | End: 2021-11-02 | Stop reason: HOSPADM

## 2021-10-26 RX ORDER — NALOXONE HYDROCHLORIDE 0.4 MG/ML
0.2 INJECTION, SOLUTION INTRAMUSCULAR; INTRAVENOUS; SUBCUTANEOUS
Status: DISCONTINUED | OUTPATIENT
Start: 2021-10-26 | End: 2021-10-26

## 2021-10-26 RX ORDER — LIDOCAINE 40 MG/G
CREAM TOPICAL
Status: ACTIVE | OUTPATIENT
Start: 2021-10-26 | End: 2021-10-29

## 2021-10-26 RX ORDER — METRONIDAZOLE 7.5 MG/G
GEL TOPICAL 2 TIMES DAILY
Status: DISCONTINUED | OUTPATIENT
Start: 2021-10-26 | End: 2021-10-26 | Stop reason: RX

## 2021-10-26 RX ORDER — PROCHLORPERAZINE MALEATE 5 MG
5 TABLET ORAL EVERY 6 HOURS PRN
Status: DISCONTINUED | OUTPATIENT
Start: 2021-10-26 | End: 2021-11-02 | Stop reason: HOSPADM

## 2021-10-26 RX ORDER — MENTHOL 1.4 %
ADHESIVE PATCH, MEDICATED TOPICAL EVERY 6 HOURS PRN
COMMUNITY
End: 2023-01-01

## 2021-10-26 RX ORDER — ALBUTEROL SULFATE 90 UG/1
1-2 AEROSOL, METERED RESPIRATORY (INHALATION)
Status: DISCONTINUED | OUTPATIENT
Start: 2021-10-26 | End: 2021-11-02 | Stop reason: HOSPADM

## 2021-10-26 RX ORDER — PROCHLORPERAZINE MALEATE 5 MG
5 TABLET ORAL EVERY 6 HOURS PRN
Status: DISCONTINUED | OUTPATIENT
Start: 2021-10-26 | End: 2021-10-26

## 2021-10-26 RX ORDER — DEXTROSE MONOHYDRATE 50 MG/ML
10-20 INJECTION, SOLUTION INTRAVENOUS
Status: DISCONTINUED | OUTPATIENT
Start: 2021-11-02 | End: 2021-11-02 | Stop reason: HOSPADM

## 2021-10-26 RX ORDER — NALOXONE HYDROCHLORIDE 0.4 MG/ML
0.2 INJECTION, SOLUTION INTRAMUSCULAR; INTRAVENOUS; SUBCUTANEOUS
Status: DISCONTINUED | OUTPATIENT
Start: 2021-10-26 | End: 2021-11-02 | Stop reason: HOSPADM

## 2021-10-26 RX ORDER — VITAMIN B COMPLEX
1000 TABLET ORAL DAILY
Status: DISCONTINUED | OUTPATIENT
Start: 2021-10-26 | End: 2021-11-02 | Stop reason: HOSPADM

## 2021-10-26 RX ORDER — ALBUTEROL SULFATE 0.83 MG/ML
2.5 SOLUTION RESPIRATORY (INHALATION)
Status: DISCONTINUED | OUTPATIENT
Start: 2021-10-26 | End: 2021-11-02 | Stop reason: HOSPADM

## 2021-10-26 RX ORDER — ACETAMINOPHEN 500 MG
500-1000 TABLET ORAL EVERY 6 HOURS PRN
COMMUNITY
End: 2022-03-09

## 2021-10-26 RX ORDER — MEPERIDINE HYDROCHLORIDE 25 MG/ML
25 INJECTION INTRAMUSCULAR; INTRAVENOUS; SUBCUTANEOUS EVERY 30 MIN PRN
Status: DISCONTINUED | OUTPATIENT
Start: 2021-10-26 | End: 2021-11-02 | Stop reason: HOSPADM

## 2021-10-26 RX ORDER — ACETAMINOPHEN 325 MG/1
650 TABLET ORAL EVERY 4 HOURS PRN
Status: DISCONTINUED | OUTPATIENT
Start: 2021-10-26 | End: 2021-11-02 | Stop reason: HOSPADM

## 2021-10-26 RX ORDER — ONDANSETRON 8 MG/1
8 TABLET, FILM COATED ORAL EVERY 8 HOURS
Status: COMPLETED | OUTPATIENT
Start: 2021-10-26 | End: 2021-11-01

## 2021-10-26 RX ORDER — CELECOXIB 200 MG/1
200 CAPSULE ORAL 2 TIMES DAILY
Status: DISCONTINUED | OUTPATIENT
Start: 2021-10-26 | End: 2021-11-02 | Stop reason: HOSPADM

## 2021-10-26 RX ORDER — EPINEPHRINE 1 MG/ML
0.3 INJECTION, SOLUTION, CONCENTRATE INTRAVENOUS EVERY 5 MIN PRN
Status: DISCONTINUED | OUTPATIENT
Start: 2021-10-26 | End: 2021-11-02 | Stop reason: HOSPADM

## 2021-10-26 RX ORDER — ONDANSETRON 2 MG/ML
8 INJECTION INTRAMUSCULAR; INTRAVENOUS EVERY 8 HOURS PRN
Status: DISCONTINUED | OUTPATIENT
Start: 2021-10-26 | End: 2021-10-26

## 2021-10-26 RX ORDER — HEPARIN SODIUM,PORCINE 10 UNIT/ML
5-20 VIAL (ML) INTRAVENOUS
Status: DISCONTINUED | OUTPATIENT
Start: 2021-10-26 | End: 2021-11-02 | Stop reason: HOSPADM

## 2021-10-26 RX ORDER — HEPARIN SODIUM,PORCINE 10 UNIT/ML
5-20 VIAL (ML) INTRAVENOUS EVERY 24 HOURS
Status: DISCONTINUED | OUTPATIENT
Start: 2021-10-26 | End: 2021-11-02 | Stop reason: HOSPADM

## 2021-10-26 RX ORDER — CODEINE PHOSPHATE AND GUAIFENESIN 10; 100 MG/5ML; MG/5ML
10 SOLUTION ORAL EVERY 4 HOURS PRN
Status: DISCONTINUED | OUTPATIENT
Start: 2021-10-26 | End: 2021-11-02 | Stop reason: HOSPADM

## 2021-10-26 RX ORDER — AMOXICILLIN 250 MG
1 CAPSULE ORAL 2 TIMES DAILY PRN
Status: DISCONTINUED | OUTPATIENT
Start: 2021-10-26 | End: 2021-11-02 | Stop reason: HOSPADM

## 2021-10-26 RX ORDER — HYDROCORTISONE 10 MG/1
10 TABLET ORAL DAILY
Status: DISCONTINUED | OUTPATIENT
Start: 2021-10-26 | End: 2021-11-02 | Stop reason: HOSPADM

## 2021-10-26 RX ORDER — TRIAMCINOLONE ACETONIDE 1 MG/G
CREAM TOPICAL 2 TIMES DAILY
Status: DISCONTINUED | OUTPATIENT
Start: 2021-10-26 | End: 2021-11-02 | Stop reason: HOSPADM

## 2021-10-26 RX ORDER — METHYLPREDNISOLONE SODIUM SUCCINATE 125 MG/2ML
125 INJECTION, POWDER, LYOPHILIZED, FOR SOLUTION INTRAMUSCULAR; INTRAVENOUS
Status: DISCONTINUED | OUTPATIENT
Start: 2021-10-26 | End: 2021-11-02 | Stop reason: HOSPADM

## 2021-10-26 RX ORDER — SUMATRIPTAN 50 MG/1
50 TABLET, FILM COATED ORAL
Status: DISCONTINUED | OUTPATIENT
Start: 2021-10-26 | End: 2021-11-02 | Stop reason: HOSPADM

## 2021-10-26 RX ORDER — ONDANSETRON 8 MG/1
8 TABLET, FILM COATED ORAL EVERY 8 HOURS PRN
Status: DISCONTINUED | OUTPATIENT
Start: 2021-10-26 | End: 2021-10-26

## 2021-10-26 RX ORDER — ONDANSETRON 4 MG/1
8 TABLET, ORALLY DISINTEGRATING ORAL EVERY 8 HOURS PRN
Status: DISCONTINUED | OUTPATIENT
Start: 2021-10-26 | End: 2021-10-26

## 2021-10-26 RX ADMIN — DOXORUBICIN HYDROCHLORIDE 80 MG: 2 INJECTABLE, LIPOSOMAL INTRAVENOUS at 18:36

## 2021-10-26 RX ADMIN — MESNA 2790 MG: 100 INJECTION, SOLUTION INTRAVENOUS at 21:44

## 2021-10-26 RX ADMIN — ONDANSETRON HYDROCHLORIDE 8 MG: 8 TABLET, FILM COATED ORAL at 17:50

## 2021-10-26 RX ADMIN — LIDOCAINE HYDROCHLORIDE ANHYDROUS 1 ML: 10 INJECTION, SOLUTION INFILTRATION at 16:02

## 2021-10-26 RX ADMIN — Medication 5 ML: at 17:00

## 2021-10-26 RX ADMIN — CELECOXIB 200 MG: 200 CAPSULE ORAL at 21:54

## 2021-10-26 RX ADMIN — METRONIDAZOLE: 7.5 CREAM TOPICAL at 21:54

## 2021-10-26 RX ADMIN — TRIAMCINOLONE ACETONIDE: 1 CREAM TOPICAL at 21:54

## 2021-10-26 RX ADMIN — Medication: at 23:47

## 2021-10-26 RX ADMIN — SODIUM CHLORIDE, PRESERVATIVE FREE 5 ML: 5 INJECTION INTRAVENOUS at 22:12

## 2021-10-26 RX ADMIN — ENOXAPARIN SODIUM 40 MG: 40 INJECTION SUBCUTANEOUS at 22:02

## 2021-10-26 RX ADMIN — Medication 10 ML: at 17:50

## 2021-10-26 RX ADMIN — HYDROCORTISONE 10 MG: 10 TABLET ORAL at 17:49

## 2021-10-26 ASSESSMENT — ACTIVITIES OF DAILY LIVING (ADL)
ADLS_ACUITY_SCORE: 4
ADLS_ACUITY_SCORE: 12
ADLS_ACUITY_SCORE: 4

## 2021-10-26 ASSESSMENT — MIFFLIN-ST. JEOR: SCORE: 1435.28

## 2021-10-26 NOTE — PHARMACY-ADMISSION MEDICATION HISTORY
Admission Medication History Completed by Pharmacy    See [facility] Admission Navigator for allergy information, preferred outpatient pharmacy, prior to admission medications and immunization status.     Medication History Sources:     Patient: Ifrah Espino    Changes made to PTA medication list (reason):    Added: Acetaminophen 500 mg tablet, calcium carbonate (TUMS) 500 mg and Bengay cream    Deleted: Hydromorphone 2 mg tablet; prochlorperazine 10 mg tablet and sumatriptan 50 mg tablet    Changed: For Hydrocortisone 10mg tab, patient is currently taking 20 mg in the morning and 10 mg at night (instead of 15 mg in the AM and 10 mg PM)    Additional Information:    Patient has his Metronidazole 0.75% gel and Triamcinolone 0.1% cream with him and would like to use those      Prior to Admission medications    Medication Sig Last Dose Taking? Auth Provider   acetaminophen (TYLENOL) 500 MG tablet Take 500-1,000 mg by mouth every 6 hours as needed for mild pain 10/26/2021 at AM Yes Unknown, Entered By History   calcium carbonate (TUMS) 500 MG chewable tablet Take 1 chew tab by mouth daily 10/26/2021 at AM Yes Unknown, Entered By History   celecoxib (CELEBREX) 200 MG capsule Take 1 capsule (200 mg) by mouth 2 times daily . Note this is a new a strength! Only one capsule at a time! 10/26/2021 at AM Yes Sal Handy MD   cholecalciferol (VITAMIN D-1000 MAX ST) 1000 units TABS Take 1,000 Units by mouth daily  10/26/2021 at AM Yes Reported, Patient   guaiFENesin-codeine (GUAIFENESIN AC) 100-10 MG/5ML syrup Take 10 mLs by mouth every 4 hours as needed for cough 10/25/2021 at PRN Yes Maynor Rios PA   hydrocortisone (CORTEF) 10 MG tablet Take 20 mg in the morning and 10 mg in the afternoon 10/26/2021 at Unknown time Yes Reported, Patient   levothyroxine (SYNTHROID/LEVOTHROID) 75 MCG tablet Take 75 mcg by mouth every morning  10/26/2021 at AM Yes Reported, Patient   Menthol-Methyl Salicylate (DALIA MALIK  GREASELESS) cream Apply topically every 6 hours as needed 10/25/2021 at PRN Yes Unknown, Entered By History   metroNIDAZOLE (METROGEL) 0.75 % external gel Apply topically 2 times daily 10/25/2021 at PM Yes Fabiola Armstrong PA-C   omeprazole (PRILOSEC) 20 MG DR capsule Take 20 mg by mouth 10/26/2021 at AM Yes Reported, Patient   rosuvastatin (CRESTOR) 40 MG tablet Take 1 tablet (40 mg) by mouth daily 10/25/2021 at PM Yes Jay Jay Hernandez MD   triamcinolone (KENALOG) 0.1 % external cream Apply topically 2 times daily  10/26/2021 at AM Yes Reported, Patient       Date completed: 10/26/21    Medication history completed by: Elvia Dixon

## 2021-10-26 NOTE — PROCEDURES
[facility]    Double Lumen PICC Placement    Date/Time: 10/26/2021 3:54 PM  Performed by: Santi Coulter RN  Authorized by: Rayna Martin PA-C   Indications: Chemotherapy.    UNIVERSAL PROTOCOL   Site Marked: Yes  Prior Images Obtained and Reviewed:  Yes  Required items: Required blood products, implants, devices and special equipment available    Patient identity confirmed:  Verbally with patient and arm band  NA - No sedation, light sedation, or local anesthesia  Confirmation Checklist:  Patient's identity using two indicators, relevant allergies, procedure was appropriate and matched the consent or emergent situation and correct equipment/implants were available  Time out: Immediately prior to the procedure a time out was called    Universal Protocol: the Joint Commission Universal Protocol was followed    Preparation: Patient was prepped and draped in usual sterile fashion           ANESTHESIA    Anesthesia: See MAR for details  Local Anesthetic:  Lidocaine 1% without epinephrine  Anesthetic Total (mL):  1      SEDATION    Patient Sedated: No        Preparation: skin prepped with ChloraPrep  Skin prep agent: skin prep agent completely dried prior to procedure  Sterile barriers: maximum sterile barriers were used: cap, mask, sterile gown, sterile gloves, and large sterile sheet  Hand hygiene: hand hygiene performed prior to central venous catheter insertion  Type of line used: Power PICC  Catheter type: double lumen  Lumen type: non-valved  Catheter size: 5 Fr  Brand: Bard  Lot number: [id number]  Placement method: venipuncture, MST, ultrasound and tip confirmation system  Number of attempts: 1  Successful placement: yes  Orientation: right  Location: basilic vein (vein diameter - 0.44 cm)  Arm circumference: adults 10 cm  Extremity circumference: 27.5  Visible catheter length: 2  Total catheter length: 43  Dressing and securement: chlorhexidine  disc applied, glue, sterile dressing applied, statlock and site cleaned  Post procedure assessment: blood return through all ports and free fluid flow (placement verified by [facility] 3[facility])  PROCEDURE   Patient Tolerance:  Patient tolerated the procedure well with no immediate complications  Describe Procedure: PICC tip is in satisfactory location as verified by [place] [facility] 3CG Tip Confirmation System. PICC is OK to use.

## 2021-10-26 NOTE — PROVIDER NOTIFICATION
DATE/TIME  (DOT-TD, DOT-NOW) CHEMO CHECK ACTIVITY (REGIMEN & DOSE CHECK, DAY, DOSE #, NAME OF CHEMO #1)  CHEMO DRUG #2  CHEMO DRUG #3 NAME OF RN #1 (USE DOT-ME HERE) NAME OF RN#2 (2ND RN TO LOG IN SEPARATELY)   10/26/2021  4:00PM Doxil/Ifosfamide/Mesna protocol check    PETER Velez RN     10/26/2021  6:25 PM Doxil double check ifosfamide/mesna  PETER Velez RN     10/27/21  7:43 PM   Dose#2 Ifosfamide/mesna   PETER Valenzuela RN     10/28/21  7:39 PM   Dose #3  Ifosfamide/Mesna  Double check    Patrick Navarro RN   ([facility])   10/29/21  6:25 PM   Dose #4  Ifosfamide/Mesna  Double check    Patrick Velazco RN     10/30/21  2016 Dose #5 ifos/mesna double check   PETER Mitchell RN    10/31/21  5:03 PM   Dose #6 Ifosfamide/Mesna  Double check    Patrick Navaror RN   Viraphet China Cho RN

## 2021-10-26 NOTE — H&P
[facility]    History & Physical  Hematology / Oncology     Date of Admission:  10/26/2021  Date of Service (when I saw the patient):  10/26/2021    Assessment & Plan   Ifrah Huitron is a 71 year old male with PMH rosacea, pituitary macroadenoma s/p resection, GERD, kidney stones, DJD, and metastatic spindle cell sarcoma who is admitted to begin treatment with Cycle #1 of Doxil + ifosfamide.    HEME/ONC  # Metastatic spindle cell sarcoma  Followed by Dr. Wolff. Patient presented to his PCP in 03/2021 with chest/left shoulder and back pain that led to imaging which revealed multiple lung mets, included a left pleural mass. There were difficulties in getting diagnostic biopsy; eventually, biopsy of the mediastinal mass (5/20/21) revealed a malignant spindle cell neoplasm with high PD-L1 expression (90%), Ki-67 70%. 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 Baseline imaging (6/21/21) revealed progression of lung mets and left-sided subdiaphragmatic mass, stable liver lesions. He was seen by ENT for hoarseness, which was attributed to left true vocal fold motion impairment from mediastinal mass. Given high PD-L1 expression, he was started on Keytruda (C1=6/21/21). Interval imaging (CT 8/2/21) revealed positive response to treatment. He received 6 cycles total, most recently C6=10/4/21. Unfortunately, restaging imaging (10/18/21) revealed interval increase in size of the LUQ/splenic mass; however, the mediastinal and left pleural mass were stable to slightly decreased in size. He met with Dr. Wolff, who recommended a change in therapy to Doxil + ifosfamide. He was admitted on 10/27/21 to receive Cycle #1 of Doxil/ifos.  - Recent Echo 9/29/21 w/ EF 55-60% w/o valvular dysfunction.   - PICC placed on admission; will remove on discharge  - Labs okay to proceed with chemo.         Treatment Plan: Doxil  + ifosfamide, C1D1=10/27/21    - Doxil 45 mg/m2 (80mg) IV once - D1     - Ifosfamide/Mesna 1500 mg/m2 (2790 mg) CIVI - D1-6     - Mesna 1500 mg/m2 (2790 mg) IV - D7     - Neulasta injection - D8, will request at [facility]    # Cancer-related pain  - Continue PTA Celebrex 200 mg BID   - Continue PTA topical Bengay PRN at bedtime to left back     CARDS   # CAD   Follows with Dr. Gigi Vernon at [facility]. He was noted to have coronary calcium on chest CT. CTA 9/29/21 with moderate mid LAD stenosis. Small to moderate caliber ramus branch with severe diffuse disease.  Echo completed 9/29/21 with normal EF and no valvular dysfunction.  - Monitor clinically  - Encourage outpatient cardiology follow-up as recommended    ENDO  # Panhypopituitarism  # Macroadenoma of the pituitary gland  Diagnosed in 2010. Found to have a pituitary macroadenoma, 1.9 cm in largest dimension. Underwent hypophysectomy on 8/23/10. Subsequent presumptive pituitary deficiency, on empiric meds for AI and thyroid. Previously followed by Dr. Bill Mccall; plans to establish care with Dr. Jamir Grant on 11/11/21.  - Continue replacement T4  - Continue hydrocortisone 15 mg qAM and 10 mg qPM  - Continue endocrinology follow-up as scheduled     ENT/GI  # GERD  - Continue PTA omeprazole  - TUMS and Pepto Bismol available PRN     # Vocal cord dysfunction  Manifested as hoarseness. Seen by ENT (Dr. Kyree Bearden) on 6/21/21 and noted to have left true vocal fold motion impairment from mediastinal mass. Underwent injection of ProLaryn (filler/bulking agent) on 7/1/21, which reportedly has been minimally beneficial. Seen in follow-up by Dr. Bearden on 9/1/21 and noted to have minimal improvement; he was then referred to [facility] ([facility]).   - No acute inpatient management issues  - Continue outpatient ENT follow-up as previously scheduled (next scheduled for 12/9/21)    DERM  # Rosacea  - Continue PTA metronidazole  gel    FEN:  -IVF per chemotherapy protocol  -Lyte replacement per protocol  -Regular diet as tolerated    Prophy/Misc:  -GI: PTA omeprazole  -DVT: enoxaparin 40 mg daily  -Bowels: Senna/Miralax PRN    Disposition: Inpatient admission to Heme/Onc Team 3 for planned chemotherapy. Anticipate discharge to home pending completion of the chemotherapy regimen, likely 11/3/21.    Discussed with Dr. Petersen.    Rayna Martin PATalC  Hematology/Oncology  Pager: [phone]    Code Status : Full Code - confirmed on admission      Primary Care Physician   Sukhdev Kohler    History of Present Illness   History obtained from chart and discussed with the patient.    Ifrah Huitron is a 71 year old male with PMH rosacea, pituitary macroadenoma s/p resection, GERD, kidney stones, DJD, and metastatic spindle cell sarcoma who is admitted to begin treatment with Cycle #1 of Doxil + ifosfamide.     On admission, Ifrah is feeling well and is excited to get treatment started. He is worried about being bored while in the hospital for the next week. 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 Reviewed common side effects including nausea/vomiting, hypophosphatemia, neurotoxicity and cytopenias. Patient understood side effects. He is having mostly left sided back pain that moves around. This is well controlled with Tylenol and topical Bengay. Reviewed plan for PICC placement. Patient denies fevers, chills, new SOB, cough, chest pain, constipation or diarrhea.     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     Thyroid disease     removed pituitary gland       Past Surgical History   Past Surgical History:   Procedure Laterality Date     COLONOSCOPY N/A 12/17/2020    Procedure: COLONOSCOPY;  Surgeon: Ken Camacho MD;  Location: [facility]     ENT SURGERY       HERNIA REPAIR       LARYNGOSCOPY, EXCISE  VOCAL CORD LESION MICROSCOPIC, COMBINED Left 7/1/2021    Procedure: MICROLARYNGOSCOPY, LEFT TRUE VOCAL CORD INJECTION WITH PROLARYN;  Surgeon: Kyree Bearden MD;  Location: [facility] OR     PHACOEMULSIFICATION WITH STANDARD INTRAOCULAR LENS IMPLANT Right 3/10/2021    Procedure: Cataract removal with implant.;  Surgeon: Jamir Mac MD;  Location: [facility] OR     PHACOEMULSIFICATION WITH STANDARD INTRAOCULAR LENS IMPLANT Left 4/5/2021    Procedure: Cataract removal with implant.;  Surgeon: Jamir Mac MD;  Location: [facility] OR     PITUITARY EXCISION       tooth pulled 4/7  Right        Prior to Admission Medications   Prior to Admission Medications   Prescriptions Last Dose Informant Patient Reported? Taking?   Menthol-Methyl Salicylate (DALIA MALIK GREASELESS) cream 10/25/2021 at PRN  Yes Yes   Sig: Apply topically every 6 hours as needed   acetaminophen (TYLENOL) 500 MG tablet 10/26/2021 at AM  Yes Yes   Sig: Take 500-1,000 mg by mouth every 6 hours as needed for mild pain   calcium carbonate (TUMS) 500 MG chewable tablet 10/26/2021 at AM  Yes Yes   Sig: Take 1 chew tab by mouth daily   celecoxib (CELEBREX) 200 MG capsule 10/26/2021 at AM  No Yes   Sig: Take 1 capsule (200 mg) by mouth 2 times daily . Note this is a new a strength! Only one capsule at a time!   cholecalciferol (VITAMIN D-1000 MAX ST) 1000 units TABS 10/26/2021 at AM Self Yes Yes   Sig: Take 1,000 Units by mouth daily    guaiFENesin-codeine (GUAIFENESIN AC) 100-10 MG/5ML syrup 10/25/2021 at PRN  No Yes   Sig: Take 10 mLs by mouth every 4 hours as needed for cough   hydrocortisone (CORTEF) 10 MG tablet 10/26/2021 at Unknown time Self Yes Yes   Sig: Take 20 mg in the morning and 10 mg in the afternoon   levothyroxine (SYNTHROID/LEVOTHROID) 75 MCG tablet 10/26/2021 at AM Self Yes Yes   Sig: Take 75 mcg by mouth every morning    metroNIDAZOLE (METROGEL) 0.75 % external gel 10/25/2021 at PM  No Yes   Sig: Apply topically 2 times daily   omeprazole  (PRILOSEC) 20 MG DR capsule 10/26/2021 at AM Self Yes Yes   Sig: Take 20 mg by mouth   rosuvastatin (CRESTOR) 40 MG tablet 10/25/2021 at PM  No Yes   Sig: Take 1 tablet (40 mg) by mouth daily   triamcinolone (KENALOG) 0.1 % external cream 10/26/2021 at AM Self Yes Yes   Sig: Apply topically 2 times daily       Facility-Administered Medications: None     Allergies   Allergies   Allergen Reactions     Penicillins Other (See Comments) and Hives     swelling, hives         Social History   Social History     Socioeconomic History     Marital status:      Spouse name: Not on file     Number of children: Not on file     Years of education: Not on file     Highest education level: Not on file   Occupational History     Not on file   Tobacco Use     Smoking status: Never Smoker     Smokeless tobacco: Never Used   Substance and Sexual Activity     Alcohol use: Yes     Comment: rare     Drug use: Never     Sexual activity: Not on file   Other Topics Concern     Parent/sibling w/ CABG, MI or angioplasty before 65F 55M? Not Asked   Social History Narrative     Not on file     Social Determinants of Health     Financial Resource Strain:      Difficulty of Paying Living Expenses:    Food Insecurity:      Worried About Running Out of Food in the Last Year:      Ran Out of Food in the Last Year:    Transportation Needs:      Lack of Transportation (Medical):      Lack of Transportation (Non-Medical):    Physical Activity:      Days of Exercise per Week:      Minutes of Exercise per Session:    Stress:      Feeling of Stress :    Social Connections:      Frequency of Communication with Friends and Family:      Frequency of Social Gatherings with Friends and Family:      Attends Faith Services:      Active Member of Clubs or Organizations:      Attends Club or Organization Meetings:      Marital Status:    Intimate Partner Violence: Unknown     Fear of Current or Ex-Partner: No     Emotionally Abused: No     Physically  Abused: Not on file     Sexually Abused: No       Family History   Family History   Problem Relation Age of Onset     Lupus Mother      ALS Father      Rheumatoid Arthritis Sister        Review of Systems   A 14-point ROS is negative unless otherwise noted above in the HPI.    Physical Exam   Vital Signs with Ranges  Temp:  [96.9  F (36.1  C)] 96.9  F (36.1  C)  Pulse:  [74] 74  Resp:  [18] 18  BP: (165)/(89) 165/89  SpO2:  [98 %] 98 %  161 lbs 4.8 oz    Constitutional: Pleasant and cooperative well put together male. Awake, alert, NAD.  HEENT: NC/AT, EOMI, sclera clear, conjunctiva normal, OP with MMM. Occasional left eye twitching noted.  Respiratory: No increased work of breathing, CTAB, no crackles or wheezing.  Cardiovascular: RRR, no murmur noted. No peripheral edema.  GI: Normal bowel sounds, soft, non-distended and non-tender.  MSK: No gross deformities  Skin: Warm, dry, well-perfused. No bruising, bleeding, rashes, or lesions on limited exam.  Neurologic: A&O. Answers questions appropriately. Voice is hoarse. Moves all extremities spontaneously. Right hemifacial spasm causing slightly asymmetrical facies.  Psych: Calm, appropriate affect    Recent Labs  CBC   Recent Labs   Lab 10/26/21  1338   WBC 12.4*   RBC 5.05   HGB 14.1   HCT 43.4   MCV 86   MCH 27.9   MCHC 32.5   RDW 13.5          CMP   Recent Labs   Lab 10/26/21  1338   MAG 2.4*   PHOS 2.6       LFTs: No lab results found in last 7 days.    Coagulation Studies: No lab results found in last 7 days.

## 2021-10-26 NOTE — PROVIDER NOTIFICATION
10/26/21 1554   PICC Double Lumen 10/26/21 Right Basilic   Placement Date/Time: 10/26/21 (c) 155   Catheter Brand: Likez  Size (Fr): 5 Fr  Lot #: [id number]  Full barrier precautions done: Yes, hand hygiene, sterile gown, sterile gloves, mask, cap, full body drape, chlorhexidine scrub  Consent Signed: Yes  Time...   Site Assessment WDL   External Cath Length (cm) 2 cm   Extremity Circumference (cm) 27.5 cm   Dressing Intervention Chlorhexidine patch;Transparent;Securing device;New dressing   Dressing Change Due 11/02/21   Purple - Status blood return noted;saline locked   Purple - Cap Change Due 10/30/21   Red - Status blood return noted;saline locked   Red - Cap Change Due 10/30/21   PICC Comment PICC inserted   Extravasation? No   Line Necessity Yes, meets criteria

## 2021-10-27 ENCOUNTER — APPOINTMENT (OUTPATIENT)
Dept: CARDIOLOGY | Facility: CLINIC | Age: 71
DRG: 847 | End: 2021-10-27
Attending: INTERNAL MEDICINE
Payer: MEDICARE

## 2021-10-27 LAB
ALBUMIN SERPL-MCNC: 2.8 G/DL (ref 3.4–5)
ALP SERPL-CCNC: 140 U/L (ref 40–150)
ALT SERPL W P-5'-P-CCNC: 44 U/L (ref 0–70)
ANION GAP SERPL CALCULATED.3IONS-SCNC: 3 MMOL/L (ref 3–14)
AST SERPL W P-5'-P-CCNC: 40 U/L (ref 0–45)
BASOPHILS # BLD AUTO: 0.1 10E3/UL (ref 0–0.2)
BASOPHILS NFR BLD AUTO: 1 %
BILIRUB SERPL-MCNC: 0.4 MG/DL (ref 0.2–1.3)
BUN SERPL-MCNC: 16 MG/DL (ref 7–30)
CALCIUM SERPL-MCNC: 8.7 MG/DL (ref 8.5–10.1)
CHLORIDE BLD-SCNC: 109 MMOL/L (ref 94–109)
CO2 SERPL-SCNC: 28 MMOL/L (ref 20–32)
CREAT SERPL-MCNC: 0.77 MG/DL (ref 0.66–1.25)
EOSINOPHIL # BLD AUTO: 0.4 10E3/UL (ref 0–0.7)
EOSINOPHIL NFR BLD AUTO: 3 %
ERYTHROCYTE [DISTWIDTH] IN BLOOD BY AUTOMATED COUNT: 13.4 % (ref 10–15)
GFR SERPL CREATININE-BSD FRML MDRD: 90 ML/MIN/1.73M2
GLUCOSE BLD-MCNC: 92 MG/DL (ref 70–99)
HCT VFR BLD AUTO: 40 % (ref 40–53)
HGB BLD-MCNC: 12.7 G/DL (ref 13.3–17.7)
IMM GRANULOCYTES # BLD: 0.1 10E3/UL
IMM GRANULOCYTES NFR BLD: 1 %
LVEF ECHO: NORMAL
LYMPHOCYTES # BLD AUTO: 2.2 10E3/UL (ref 0.8–5.3)
LYMPHOCYTES NFR BLD AUTO: 20 %
MAGNESIUM SERPL-MCNC: 2.3 MG/DL (ref 1.6–2.3)
MCH RBC QN AUTO: 27.5 PG (ref 26.5–33)
MCHC RBC AUTO-ENTMCNC: 31.8 G/DL (ref 31.5–36.5)
MCV RBC AUTO: 87 FL (ref 78–100)
MONOCYTES # BLD AUTO: 1 10E3/UL (ref 0–1.3)
MONOCYTES NFR BLD AUTO: 10 %
NEUTROPHILS # BLD AUTO: 6.9 10E3/UL (ref 1.6–8.3)
NEUTROPHILS NFR BLD AUTO: 65 %
NRBC # BLD AUTO: 0 10E3/UL
NRBC BLD AUTO-RTO: 0 /100
PHOSPHATE SERPL-MCNC: 2.6 MG/DL (ref 2.5–4.5)
PLATELET # BLD AUTO: 241 10E3/UL (ref 150–450)
POTASSIUM BLD-SCNC: 3.8 MMOL/L (ref 3.4–5.3)
PROT SERPL-MCNC: 6.3 G/DL (ref 6.8–8.8)
RBC # BLD AUTO: 4.62 10E6/UL (ref 4.4–5.9)
SODIUM SERPL-SCNC: 140 MMOL/L (ref 133–144)
WBC # BLD AUTO: 10.5 10E3/UL (ref 4–11)

## 2021-10-27 PROCEDURE — 250N000013 HC RX MED GY IP 250 OP 250 PS 637: Performed by: PHYSICIAN ASSISTANT

## 2021-10-27 PROCEDURE — 120N000002 HC R&B MED SURG/OB UMMC

## 2021-10-27 PROCEDURE — 250N000011 HC RX IP 250 OP 636: Performed by: PHYSICIAN ASSISTANT

## 2021-10-27 PROCEDURE — 83735 ASSAY OF MAGNESIUM: CPT | Performed by: PHYSICIAN ASSISTANT

## 2021-10-27 PROCEDURE — 85025 COMPLETE CBC W/AUTO DIFF WBC: CPT | Performed by: PHYSICIAN ASSISTANT

## 2021-10-27 PROCEDURE — 82040 ASSAY OF SERUM ALBUMIN: CPT | Performed by: PHYSICIAN ASSISTANT

## 2021-10-27 PROCEDURE — 258N000003 HC RX IP 258 OP 636: Performed by: INTERNAL MEDICINE

## 2021-10-27 PROCEDURE — 93306 TTE W/DOPPLER COMPLETE: CPT | Mod: 26 | Performed by: INTERNAL MEDICINE

## 2021-10-27 PROCEDURE — 93306 TTE W/DOPPLER COMPLETE: CPT

## 2021-10-27 PROCEDURE — 84100 ASSAY OF PHOSPHORUS: CPT | Performed by: PHYSICIAN ASSISTANT

## 2021-10-27 PROCEDURE — 250N000011 HC RX IP 250 OP 636: Performed by: INTERNAL MEDICINE

## 2021-10-27 PROCEDURE — 36592 COLLECT BLOOD FROM PICC: CPT | Performed by: PHYSICIAN ASSISTANT

## 2021-10-27 PROCEDURE — 99233 SBSQ HOSP IP/OBS HIGH 50: CPT | Performed by: INTERNAL MEDICINE

## 2021-10-27 RX ORDER — ROSUVASTATIN CALCIUM 40 MG/1
40 TABLET, COATED ORAL DAILY
Status: DISCONTINUED | OUTPATIENT
Start: 2021-10-27 | End: 2021-11-02 | Stop reason: HOSPADM

## 2021-10-27 RX ADMIN — METRONIDAZOLE: 7.5 CREAM TOPICAL at 21:40

## 2021-10-27 RX ADMIN — HYDROCORTISONE 15 MG: 5 TABLET ORAL at 10:51

## 2021-10-27 RX ADMIN — TRIAMCINOLONE ACETONIDE: 1 CREAM TOPICAL at 21:40

## 2021-10-27 RX ADMIN — PANTOPRAZOLE SODIUM 40 MG: 40 TABLET, DELAYED RELEASE ORAL at 06:28

## 2021-10-27 RX ADMIN — Medication 5 ML: at 05:55

## 2021-10-27 RX ADMIN — CELECOXIB 200 MG: 200 CAPSULE ORAL at 19:50

## 2021-10-27 RX ADMIN — LEVOTHYROXINE SODIUM 75 MCG: 0.05 TABLET ORAL at 05:21

## 2021-10-27 RX ADMIN — ROSUVASTATIN CALCIUM 40 MG: 40 TABLET, COATED ORAL at 21:39

## 2021-10-27 RX ADMIN — METRONIDAZOLE: 7.5 CREAM TOPICAL at 10:53

## 2021-10-27 RX ADMIN — ONDANSETRON HYDROCHLORIDE 8 MG: 8 TABLET, FILM COATED ORAL at 17:19

## 2021-10-27 RX ADMIN — Medication: at 23:33

## 2021-10-27 RX ADMIN — ENOXAPARIN SODIUM 40 MG: 40 INJECTION SUBCUTANEOUS at 21:38

## 2021-10-27 RX ADMIN — ONDANSETRON HYDROCHLORIDE 8 MG: 8 TABLET, FILM COATED ORAL at 10:51

## 2021-10-27 RX ADMIN — ACETAMINOPHEN 650 MG: 325 TABLET, FILM COATED ORAL at 10:51

## 2021-10-27 RX ADMIN — CELECOXIB 200 MG: 200 CAPSULE ORAL at 10:51

## 2021-10-27 RX ADMIN — Medication 5 ML: at 17:20

## 2021-10-27 RX ADMIN — ACETAMINOPHEN 650 MG: 325 TABLET, FILM COATED ORAL at 21:38

## 2021-10-27 RX ADMIN — Medication 1000 UNITS: at 10:51

## 2021-10-27 RX ADMIN — ONDANSETRON HYDROCHLORIDE 8 MG: 8 TABLET, FILM COATED ORAL at 01:10

## 2021-10-27 RX ADMIN — ONDANSETRON HYDROCHLORIDE 8 MG: 8 TABLET, FILM COATED ORAL at 23:33

## 2021-10-27 RX ADMIN — MESNA 2790 MG: 100 INJECTION, SOLUTION INTRAVENOUS at 20:48

## 2021-10-27 RX ADMIN — TRIAMCINOLONE ACETONIDE: 1 CREAM TOPICAL at 10:52

## 2021-10-27 RX ADMIN — HYDROCORTISONE 10 MG: 10 TABLET ORAL at 17:20

## 2021-10-27 ASSESSMENT — ACTIVITIES OF DAILY LIVING (ADL)
ADLS_ACUITY_SCORE: 4
DEPENDENT_IADLS:: INDEPENDENT
ADLS_ACUITY_SCORE: 4

## 2021-10-27 ASSESSMENT — MIFFLIN-ST. JEOR: SCORE: 1413.51

## 2021-10-27 NOTE — PROGRESS NOTES
Nursing Focus: Admission  D: Arrived at 1300 from home. Patient accompanied by wife. Admitted for chemotherapy. No complaints on admission.      I: Admission process began.  Patient oriented to room, enviroment, call light.  Md. notified of patients arrival on unit.     A: Vital signs stable, afebrile.  Patient stable at this time.     P: Implement plan of care when available. Continue to monitor patient. Nursing interventions as appropriate. Notify md with changes in pt status.

## 2021-10-27 NOTE — PLAN OF CARE
VSS, afebrile. Day 1 Ifos/mesna continues w/ good blood return. Denies nausea/SOB. Complaints of headache, tylenol given x1, some relief provided. Echo completed. No acute events. Continue to monitor & w/ POC.,

## 2021-10-27 NOTE — PROGRESS NOTES
Nursing Focus: Chemotherapy    D: Positive blood return via PICC. Insertion site is clean/dry/intact, dressing intact with no complaints of pain.  Urine output is recorded in intake in Doc Flowsheet.      I: Premedications given per order (see electronic medical administration record). Dose #1 of Ifosfamide/Mesna started to infuse over 24 hours. Reviewed pt teaching on chemotherapy side effects.  Pt denies need for further teaching. Chemotherapy double checked per protocol by two chemotherapy competent RN's.     A: Tolerating chemotherapy well. Denies nausea and or pain.     P: Continue to monitor urine output and symptoms of nausea. Screen for symptoms of toxicity.

## 2021-10-27 NOTE — CONSULTS
Care Management Initial Consult    General Information  Assessment completed with: Care Team MemberMARIE-chart review    Type of CM/SW Visit: Initial Assessment    Primary Care Provider verified and updated as needed: Yes   Readmission within the last 30 days: No previous admission in last 30 days      Reason for Consult: Elevated Risk Score  Advance Care Planning: Reviewed: Yes        Communication Assessment  Patient's communication style: Spoken language (English or Bilingual)    Hearing Difficulty or Deaf: no   Wear Glasses or Blind: yes    Cognitive  Cognitive/Neuro/Behavioral: WDL                      Living Environment:   People in home: Spouse     Current living Arrangements: House      Able to return to prior arrangements: Yes    Family/Social Support:  Care provided by: Self  Provides care for: No one             Description of Support System: Supportive, Involved      Current Resources:   Patient receiving home care services: No  Community Resources: OP Infusion  Equipment currently used at home: None  Supplies currently used at home: None    Employment/Financial:  Employment Status: Employed full-time        Financial Concerns: No concerns identified      Lifestyle & Psychosocial Needs:  Social Determinants of Health     Tobacco Use: Low Risk      Smoking Tobacco Use: Never Smoker     Smokeless Tobacco Use: Never Used   Alcohol Use:      Frequency of Alcohol Consumption:      Average Number of Drinks:      Frequency of Binge Drinking:    Financial Resource Strain:      Difficulty of Paying Living Expenses:    Food Insecurity:      Worried About Running Out of Food in the Last Year:      Ran Out of Food in the Last Year:    Transportation Needs:      Lack of Transportation (Medical):      Lack of Transportation (Non-Medical):    Physical Activity:      Days of Exercise per Week:      Minutes of Exercise per Session:    Stress:      Feeling of Stress :    Social Connections:      Frequency of Communication  with Friends and Family:      Frequency of Social Gatherings with Friends and Family:      Attends Druze Services:      Active Member of Clubs or Organizations:      Attends Club or Organization Meetings:      Marital Status:    Intimate Partner Violence: Unknown     Fear of Current or Ex-Partner: No     Emotionally Abused: No     Physically Abused: Not on file     Sexually Abused: No   Depression: Not at risk     PHQ-2 Score: 0   Housing Stability:      Unable to Pay for Housing in the Last Year:      Number of Places Lived in the Last Year:      Unstable Housing in the Last Year:        Functional Status:  Prior to admission patient needed assistance:   Dependent ADLs: Independent  Dependent IADLs: Independent  Assesssment of Functional Status: At functional baseline    Mental Health Status:  Mental Health Status: No Current Concerns       Chemical Dependency Status:  Chemical Dependency Status: No Current Concerns           Values/Beliefs:  Spiritual, Cultural Beliefs, Druze Practices, Values that affect care: No               Additional Information:    Patient is a 71 year old male with PMH rosacea, pituitary macroadenoma s/p resection, GERD, kidney stones, DJD, and metastatic spindle cell sarcoma who is admitted to begin treatment with Cycle #1 of Doxil + ifosfamide.    CM assessment being completed due to elevated unplanned readmission risk score.     Per care team, patient is independent at baseline and does not currently receive any in-home supports or services. Patient does receive OP Infusion Services at [facility]. Patient will likely discharge on 11/3, following completion of chemotherapy. No RNCC/SW needs are anticipated at the time of discharge.     Care Management will follow and assist with discharge planning should discharge needs arise.     Radha Wong, RN, BSN, PHN  Care Coordinator   P: [phone], [facility]

## 2021-10-27 NOTE — PLAN OF CARE
"BP (!) 165/89 (BP Location: Right arm)   Pulse 74   Temp 96.9  F (36.1  C) (Oral)   Resp 18   Ht 1.702 m (5' 7\")   Wt 73.2 kg (161 lb 4.8 oz)   SpO2 98%   BMI 25.26 kg/m      [id number]: Hypertensive, OVSS on RA. Afebrile. Denies pain, nausea and SOB. Up independently ambulating in hallways. Good appetite. PICC placed and OK to use. R sided facial twitch at baseline. Doxil infusing via PICC - c/o sharp pain in lower back that subsided with standing up and bending over to stretch his back. Plan for dose 1 ifos/mesna tonight. Continue to monitor w/ POC.   "

## 2021-10-27 NOTE — PROGRESS NOTES
[facility]    Progress Note  Hematology / Oncology     Date of Admission:  10/26/2021  Hospital Day #: 1   Date of Service (when I saw the patient): 10/27/2021    Assessment & Plan   Ifrah Huitron is a 71 year old male with PMH rosacea, pituitary macroadenoma s/p resection, CAD, GERD, kidney stones, DJD, and metastatic spindle cell sarcoma who was admitted to begin treatment with Cycle #1 of Doxil + ifosfamide.    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 Wolff. Patient presented to his PCP in 03/2021 with chest/left shoulder and back pain that led to imaging which revealed multiple lung mets, included a left pleural mass. There were difficulties in getting diagnostic biopsy; eventually, biopsy of the mediastinal mass (5/20/21) revealed a poorly characterized malignant spindle cell neoplasm with high PD-L1 expression (90%), Ki-67 70%. 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 Baseline imaging (6/21/21) revealed progression of lung mets and left-sided subdiaphragmatic mass, stable liver lesions. He was seen by ENT for hoarseness, which was attributed to left true vocal fold motion impairment from mediastinal mass. Given high PD-L1 expression, he was started on Keytruda (C1=6/21/21). Interval imaging (CT 8/2/21) revealed positive response to treatment. He received 6 cycles total, most recently C6=10/4/21. Unfortunately, restaging imaging (10/18/21) revealed interval increase in size of the LUQ/splenic mass; however, the mediastinal and left pleural mass were stable to slightly decreased in size. He met with Dr. Wolff, who recommended a change in therapy to Doxil + ifosfamide. He was admitted on 10/27/21 to receive Cycle #1 of Doxil/ifos.  - Recent echocardiogram (9/29/21) with normal EF of 55-60% without valvular dysfunction.  - PICC placed on  admission; will remove on discharge.                                                        Treatment Plan: Doxil + ifosfamide, C1D1=10/27/21                            - Doxil 45 mg/m2 (80mg) IV once - D1                             - Ifosfamide/Mesna 1500 mg/m2 (2790 mg) CIVI - D1-6                             - Mesna 1500 mg/m2 (2790 mg) IV - D7                             - Neulasta injection - D8, requested at [facility] on 11/4/21     # Cancer-related pain  - Continue PTA Celebrex 200 mg BID   - Continue PTA topical Bengay PRN at bedtime to left back      CARDS   # CAD   Follows with Dr. Gigi Vernon at [facility]. He was noted to have coronary calcium on chest CT. CTA 9/29/21 with moderate mid LAD stenosis. Small to moderate caliber ramus branch with severe diffuse disease.  Echo completed 9/29/21 with normal EF and no valvular dysfunction.  - Monitor clinically  - Continue PTA rosuvastatin 40 mg daily per patient preference; monitor transaminases closely in the setting of concomitant chemotherapy administration.  - Encourage outpatient cardiology follow-up as recommended     ENDO  # Panhypopituitarism  # Macroadenoma of the pituitary gland  Diagnosed in 2010. Found to have a pituitary macroadenoma, 1.9 cm in largest dimension. Underwent hypophysectomy on 8/23/10. Subsequent presumptive pituitary deficiency, on empiric meds for AI and thyroid. Previously followed by Dr. Bill Mccall; plans to establish care with Dr. Jamir Grant on 11/11/21.  - Continue replacement T4  - Continue hydrocortisone 15 mg qAM and 10 mg qPM  - Continue endocrinology follow-up as scheduled     ENT/GI  # GERD  - Continue PTA omeprazole  - TUMS and Pepto Bismol available PRN      # Vocal cord dysfunction  Manifested as hoarseness. Seen by ENT (Dr. Kyree Bearden) on 6/21/21 and noted to have left true vocal fold motion impairment from mediastinal mass. Underwent injection of ProLaryn (filler/bulking agent) on  7/1/21, which reportedly has been minimally beneficial. Seen in follow-up by Dr. Bearden on 9/1/21 and noted to have minimal improvement; he was then referred to [facility] ([facility]).   - No acute inpatient management issues  - Continue outpatient ENT follow-up as previously scheduled (next scheduled for 12/9/21)     DERM  # Rosacea  - Continue PTA metronidazole gel     FEN:  -IVF per chemotherapy protocol  -Lyte replacement per protocol  -Regular diet as tolerated     Prophy/Misc:  -GI: PTA omeprazole  -DVT: enoxaparin 40 mg daily  -Bowels: Senna/Miralax PRN     Disposition: Inpatient admission to Heme/Onc Team 3 for planned chemotherapy. Anticipate discharge to home pending completion of the chemotherapy regimen, likely 11/3/21.    Follow-up: Requested weekly labs, Neulasta injection, and MARLEEN follow-up; await scheduling.     Discussed with Dr. Smith.    Rayna Martin PA-C  Hematology/Oncology  Pager: #[phone]    Interval History   No acute overnight events. Ifrah is doing well this morning. He has a very mild headache, but denies fevers, chills, chest pain, nausea, vomiting, diarrhea, constipation, tremors, confusion, or other concerns. His appetite is reasonably good. He is somewhat bored already and is looking for ways to pass the time. He spent some time in the sunroom yesterday. He denies other complaints or concerns.    A comprehensive review of symptoms was performed and was negative except as detailed in the interval history above.    Physical Exam   Vital Signs with Ranges  Temp:  [96.7  F (35.9  C)-98.1  F (36.7  C)] 96.7  F (35.9  C)  Pulse:  [74-91] 74  Resp:  [16-20] 16  BP: (130-165)/(74-94) 130/74  SpO2:  [98 %-100 %] 99 %    I/O last 3 completed shifts:  In: 20 [I.V.:20]  Out: 1450 [Urine:1450]    Vitals:    10/26/21 1310 10/27/21 0832   Weight: 73.2 kg (161 lb 4.8 oz) 71 kg (156 lb 8 oz)     Constitutional: Pleasant and cooperative male. Smiling and conversational. Awake, alert,  NAD.  HEENT: NC/AT, EOMI, sclera clear, conjunctiva normal, OP with MMM. Occasional right eye twitching noted.  Respiratory: No increased work of breathing, CTAB, no crackles or wheezing.  Cardiovascular: RRR, no murmur noted. No peripheral edema.  GI: Normal bowel sounds, soft, non-distended and non-tender.  MSK: No gross deformities.  Skin: Warm, dry, well-perfused. No bruising, bleeding, rashes, or lesions on limited exam.  Neurologic: A&O. Answers questions appropriately. Voice is hoarse. Moves all extremities spontaneously. Right hemifacial spasm causing slightly asymmetrical facies at rest. Significant right facial droop noted with facial movements. Symmetrical tongue, palate, and uvula. PERRL. EOMI. Normal finger-to-nose. Intact rapid alternating movements. No tremor. Normal gait and stance.  Psych: Calm, appropriate affect    Medications         celecoxib  200 mg Oral BID     Chemotherapy Infusing-Continuous Infusion   Does not apply Q8H     [START ON 11/2/2021] dextrose 5% water  10-20 mL Intracatheter Daily at 8 pm     [START ON 11/2/2021] dextrose 5% water  10-20 mL Intracatheter Daily at 8 pm     enoxaparin ANTICOAGULANT  40 mg Subcutaneous Q24H     [START ON 11/2/2021] filgrastim (NEUPOGEN/GRANIX) intravenous  5 mcg/kg (Treatment Plan Recorded) Intravenous Daily at 8 pm     heparin lock flush  5-20 mL Intracatheter Q24H     heparin lock flush  5-20 mL Intracatheter Q24H     hydrocortisone  10 mg Oral Daily     hydrocortisone  15 mg Oral Daily     ifosfamide (IFEX) infusion  1,500 mg/m2 (Treatment Plan Recorded) Intravenous Q24H     levothyroxine  75 mcg Oral QAM     [START ON 11/1/2021] mesna (MESNEX) infusion  1,500 mg/m2 (Treatment Plan Recorded) Intravenous Once     metroNIDAZOLE   Topical BID     ondansetron  8 mg Oral Q8H     pantoprazole  40 mg Oral QAM AC     rosuvastatin  40 mg Oral Daily     sodium chloride (PF)  10-40 mL Intracatheter Q8H     triamcinolone   Topical BID     Vitamin D3  1,000  Units Oral Daily       Antiinfectives  Anti-infectives (From now, onward)    None          Data   CBC   Recent Labs   Lab 10/27/21  0605 10/26/21  1712 10/26/21  1338   WBC 10.5 11.0 12.4*   RBC 4.62 4.95 5.05   HGB 12.7* 13.6 14.1   HCT 40.0 42.5 43.4   MCV 87 86 86   MCH 27.5 27.5 27.9   MCHC 31.8 32.0 32.5   RDW 13.4 13.3 13.5    247 268       CMP   Recent Labs   Lab 10/27/21  0605 10/26/21  1704 10/26/21  1338    140 139   POTASSIUM 3.8 4.0 4.1   CHLORIDE 109 109 108   CO2 28 27 28   ANIONGAP 3 4 3   GLC 92 126* 114*   BUN 16 16 16   CR 0.77 0.73 0.78   GFRESTIMATED 90 >90 90   COTY 8.7 9.1 9.7   MAG 2.3  --  2.4*   PHOS 2.6  --  2.6   PROTTOTAL 6.3* 7.0 7.6   ALBUMIN 2.8* 3.3* 3.7   BILITOTAL 0.4 0.3 0.4   ALKPHOS 140 160* 169*   AST 40 32 36   ALT 44 44 44       LFTs   Recent Labs   Lab 10/27/21  0605   PROTTOTAL 6.3*   ALBUMIN 2.8*   BILITOTAL 0.4   ALKPHOS 140   AST 40   ALT 44       Coagulation Studies No lab results found in last 7 days.

## 2021-10-27 NOTE — PLAN OF CARE
[id number]    Day 1 Ifosfamide/Mesna infusing via PICC, brisk blood returns. Tolerated Doxil infusion at max rate. Baseline right sided facial twitch/droop, others neuros intact.     Hypertensive, not within parameters to notify. Afebrile. Initially had 2/10 back pain that radiates from his spine to his left side, managing with scheduled Celebrex and PRN Bengay. Pain tends to creep up towards bedtime, denied pain around 0400. Denies nausea and SOB. Tends to graze at food over the day. Continue with POC.

## 2021-10-27 NOTE — PROGRESS NOTES
Nursing Focus: Chemotherapy  D: Positive brisk bright red blood return via PICC. Insertion site is clean/dry/intact, dressing intact with no complaints of pain.  Urine output is recorded in intake Doc Flowsheet.    I: Zofran premedications given per order (see electronic medical administration record). Dose #1 of Doxil started to infuse slowly. Reviewed pt teaching on chemotherapy side effects.  Pt denies need for further teaching. Chemotherapy double checked per protocol by two chemotherapy competent RN's.   A: Tolerating chemo well. Denies nausea.   P: Continue to monitor urine output and symptoms of nausea. Screen for symptoms of toxicity.

## 2021-10-27 NOTE — INTERIM SUMMARY
Cross cover    Received page regarding patient: Pt wants topical that is like tommie-erik but doesn't smell. Ordered icy hot q6h prn.     Dr. Yuko Lundberg  Internal Medicine/Pediatrics      This note was created using voice recognition software and may contain minor errors.

## 2021-10-28 LAB
ALBUMIN SERPL-MCNC: 2.7 G/DL (ref 3.4–5)
ALP SERPL-CCNC: 139 U/L (ref 40–150)
ALT SERPL W P-5'-P-CCNC: 44 U/L (ref 0–70)
ANION GAP SERPL CALCULATED.3IONS-SCNC: 1 MMOL/L (ref 3–14)
AST SERPL W P-5'-P-CCNC: 34 U/L (ref 0–45)
BASOPHILS # BLD AUTO: 0 10E3/UL (ref 0–0.2)
BASOPHILS NFR BLD AUTO: 0 %
BILIRUB SERPL-MCNC: 0.7 MG/DL (ref 0.2–1.3)
BUN SERPL-MCNC: 16 MG/DL (ref 7–30)
CALCIUM SERPL-MCNC: 8.7 MG/DL (ref 8.5–10.1)
CHLORIDE BLD-SCNC: 111 MMOL/L (ref 94–109)
CO2 SERPL-SCNC: 28 MMOL/L (ref 20–32)
CREAT SERPL-MCNC: 0.81 MG/DL (ref 0.66–1.25)
EOSINOPHIL # BLD AUTO: 0.3 10E3/UL (ref 0–0.7)
EOSINOPHIL NFR BLD AUTO: 3 %
ERYTHROCYTE [DISTWIDTH] IN BLOOD BY AUTOMATED COUNT: 13.6 % (ref 10–15)
GFR SERPL CREATININE-BSD FRML MDRD: 88 ML/MIN/1.73M2
GLUCOSE BLD-MCNC: 97 MG/DL (ref 70–99)
HCT VFR BLD AUTO: 38.4 % (ref 40–53)
HGB BLD-MCNC: 12.3 G/DL (ref 13.3–17.7)
IMM GRANULOCYTES # BLD: 0 10E3/UL
IMM GRANULOCYTES NFR BLD: 0 %
LYMPHOCYTES # BLD AUTO: 2.1 10E3/UL (ref 0.8–5.3)
LYMPHOCYTES NFR BLD AUTO: 20 %
MAGNESIUM SERPL-MCNC: 2.3 MG/DL (ref 1.6–2.3)
MCH RBC QN AUTO: 27.3 PG (ref 26.5–33)
MCHC RBC AUTO-ENTMCNC: 32 G/DL (ref 31.5–36.5)
MCV RBC AUTO: 85 FL (ref 78–100)
MONOCYTES # BLD AUTO: 0.9 10E3/UL (ref 0–1.3)
MONOCYTES NFR BLD AUTO: 9 %
NEUTROPHILS # BLD AUTO: 7 10E3/UL (ref 1.6–8.3)
NEUTROPHILS NFR BLD AUTO: 68 %
NRBC # BLD AUTO: 0 10E3/UL
NRBC BLD AUTO-RTO: 0 /100
PHOSPHATE SERPL-MCNC: 2.4 MG/DL (ref 2.5–4.5)
PHOSPHATE SERPL-MCNC: 2.4 MG/DL (ref 2.5–4.5)
PLATELET # BLD AUTO: 209 10E3/UL (ref 150–450)
POTASSIUM BLD-SCNC: 3.8 MMOL/L (ref 3.4–5.3)
PROT SERPL-MCNC: 6.3 G/DL (ref 6.8–8.8)
RBC # BLD AUTO: 4.51 10E6/UL (ref 4.4–5.9)
SODIUM SERPL-SCNC: 140 MMOL/L (ref 133–144)
WBC # BLD AUTO: 10.4 10E3/UL (ref 4–11)

## 2021-10-28 PROCEDURE — 99232 SBSQ HOSP IP/OBS MODERATE 35: CPT | Performed by: INTERNAL MEDICINE

## 2021-10-28 PROCEDURE — 250N000011 HC RX IP 250 OP 636: Performed by: INTERNAL MEDICINE

## 2021-10-28 PROCEDURE — 250N000013 HC RX MED GY IP 250 OP 250 PS 637: Performed by: PHYSICIAN ASSISTANT

## 2021-10-28 PROCEDURE — 36592 COLLECT BLOOD FROM PICC: CPT | Performed by: PHYSICIAN ASSISTANT

## 2021-10-28 PROCEDURE — 250N000009 HC RX 250: Performed by: INTERNAL MEDICINE

## 2021-10-28 PROCEDURE — 85025 COMPLETE CBC W/AUTO DIFF WBC: CPT | Performed by: PHYSICIAN ASSISTANT

## 2021-10-28 PROCEDURE — 258N000003 HC RX IP 258 OP 636: Performed by: INTERNAL MEDICINE

## 2021-10-28 PROCEDURE — 84100 ASSAY OF PHOSPHORUS: CPT | Performed by: PHYSICIAN ASSISTANT

## 2021-10-28 PROCEDURE — 84100 ASSAY OF PHOSPHORUS: CPT | Performed by: INTERNAL MEDICINE

## 2021-10-28 PROCEDURE — 250N000011 HC RX IP 250 OP 636: Performed by: PHYSICIAN ASSISTANT

## 2021-10-28 PROCEDURE — 83735 ASSAY OF MAGNESIUM: CPT | Performed by: PHYSICIAN ASSISTANT

## 2021-10-28 PROCEDURE — 999N000007 HC SITE CHECK

## 2021-10-28 PROCEDURE — 36592 COLLECT BLOOD FROM PICC: CPT | Performed by: INTERNAL MEDICINE

## 2021-10-28 PROCEDURE — 120N000002 HC R&B MED SURG/OB UMMC

## 2021-10-28 PROCEDURE — 82040 ASSAY OF SERUM ALBUMIN: CPT | Performed by: PHYSICIAN ASSISTANT

## 2021-10-28 RX ADMIN — Medication 5 ML: at 05:41

## 2021-10-28 RX ADMIN — TRIAMCINOLONE ACETONIDE: 1 CREAM TOPICAL at 22:01

## 2021-10-28 RX ADMIN — POTASSIUM PHOSPHATE, MONOBASIC AND POTASSIUM PHOSPHATE, DIBASIC 9 MMOL: 224; 236 INJECTION, SOLUTION, CONCENTRATE INTRAVENOUS at 10:51

## 2021-10-28 RX ADMIN — Medication 1000 UNITS: at 08:49

## 2021-10-28 RX ADMIN — METRONIDAZOLE: 7.5 CREAM TOPICAL at 22:01

## 2021-10-28 RX ADMIN — PROCHLORPERAZINE MALEATE 5 MG: 5 TABLET ORAL at 12:24

## 2021-10-28 RX ADMIN — ACETAMINOPHEN 650 MG: 325 TABLET, FILM COATED ORAL at 22:11

## 2021-10-28 RX ADMIN — PANTOPRAZOLE SODIUM 40 MG: 40 TABLET, DELAYED RELEASE ORAL at 07:34

## 2021-10-28 RX ADMIN — METRONIDAZOLE: 7.5 CREAM TOPICAL at 12:12

## 2021-10-28 RX ADMIN — CELECOXIB 200 MG: 200 CAPSULE ORAL at 19:45

## 2021-10-28 RX ADMIN — CELECOXIB 200 MG: 200 CAPSULE ORAL at 08:50

## 2021-10-28 RX ADMIN — HYDROCORTISONE 15 MG: 5 TABLET ORAL at 08:47

## 2021-10-28 RX ADMIN — Medication: at 22:14

## 2021-10-28 RX ADMIN — HYDROCORTISONE 10 MG: 10 TABLET ORAL at 15:46

## 2021-10-28 RX ADMIN — ENOXAPARIN SODIUM 40 MG: 40 INJECTION SUBCUTANEOUS at 22:01

## 2021-10-28 RX ADMIN — ROSUVASTATIN CALCIUM 40 MG: 40 TABLET, COATED ORAL at 22:01

## 2021-10-28 RX ADMIN — TRIAMCINOLONE ACETONIDE: 1 CREAM TOPICAL at 12:11

## 2021-10-28 RX ADMIN — ONDANSETRON HYDROCHLORIDE 8 MG: 8 TABLET, FILM COATED ORAL at 08:48

## 2021-10-28 RX ADMIN — MESNA 2790 MG: 100 INJECTION, SOLUTION INTRAVENOUS at 20:10

## 2021-10-28 RX ADMIN — ONDANSETRON HYDROCHLORIDE 8 MG: 8 TABLET, FILM COATED ORAL at 17:08

## 2021-10-28 RX ADMIN — LEVOTHYROXINE SODIUM 75 MCG: 0.05 TABLET ORAL at 06:40

## 2021-10-28 RX ADMIN — Medication 5 ML: at 15:47

## 2021-10-28 ASSESSMENT — ACTIVITIES OF DAILY LIVING (ADL)
ADLS_ACUITY_SCORE: 4

## 2021-10-28 ASSESSMENT — MIFFLIN-ST. JEOR: SCORE: 1416.23

## 2021-10-28 NOTE — PROGRESS NOTES
[facility]    Progress Note  Hematology / Oncology     Date of Admission:  10/26/2021  Hospital Day #: 2   Date of Service (when I saw the patient): 10/28/2021    Assessment & Plan   Ifrah Huitron is a 71 year old male with PMH rosacea, pituitary macroadenoma s/p resection, CAD, GERD, kidney stones, DJD, and metastatic spindle cell sarcoma who was admitted to begin treatment with Cycle #1 of Doxil + ifosfamide.      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 Wolff. Patient presented to his PCP in 03/2021 with chest/left shoulder and back pain that led to imaging which revealed multiple lung mets, included a left pleural mass. There were difficulties in getting diagnostic biopsy; eventually, biopsy of the mediastinal mass (5/20/21) revealed a poorly characterized malignant spindle cell neoplasm with high PD-L1 expression (90%), Ki-67 70%. 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 Baseline imaging (6/21/21) revealed progression of lung mets and left-sided subdiaphragmatic mass, stable liver lesions. He was seen by ENT for hoarseness, which was attributed to left true vocal fold motion impairment from mediastinal mass. Given high PD-L1 expression, he was started on Keytruda (C1=6/21/21). Interval imaging (CT 8/2/21) revealed positive response to treatment. He received 6 cycles total, most recently C6=10/4/21. Unfortunately, restaging imaging (10/18/21) revealed interval increase in size of the LUQ/splenic mass; however, the mediastinal and left pleural mass were stable to slightly decreased in size. He met with Dr. Wolff, who recommended a change in therapy to Doxil + ifosfamide. He was admitted on 10/27/21 to receive Cycle #1 of Doxil/ifos.  - PICC placed on admission; will remove on discharge.                                                         Treatment Plan: Doxil + ifosfamide (C1D1=10/26/21). Today will be Day 3.                             - Doxil 45 mg/m2 (80mg) IV once - D1                             - Ifosfamide/Mesna 1500 mg/m2 (2790 mg) CIVI - D1-6                             - Mesna 1500 mg/m2 (2790 mg) IV over 18 hrs - starting D7                             - Neulasta injection - D8, requested     # Cancer-related pain  - Continue PTA Celebrex 200 mg BID   - Continue PTA topical Bengay PRN at bedtime to left back     # Anemia, mild.  Likely related to hydration, blood draw, chemo.   - monitor  - would plan to transfuse if Hgb <7     CARDS   # CAD   Follows with Dr. Gigi Vernon at [facility]. He was noted to have coronary calcium on chest CT. CTA 9/29/21 with moderate mid LAD stenosis. Small to moderate caliber ramus branch with severe diffuse disease.  Echo completed 9/29/21 with normal EF and no valvular dysfunction. Repeat ECHO on 10/27 with EF 60-65%, early diastolic dysfunction but otherwise no change from previous.  - Continue PTA rosuvastatin 40 mg daily per patient preference; monitor transaminases closely in the setting of concomitant chemotherapy administration. WNL at this time.  - Encourage outpatient cardiology follow-up as recommended     ENDO  # Panhypopituitarism  # Macroadenoma of the pituitary gland  Diagnosed in 2010. Found to have a pituitary macroadenoma, 1.9 cm in largest dimension. Underwent hypophysectomy on 8/23/10. Subsequent presumptive pituitary deficiency, on empiric meds for adrenal insufficiency and thyroid. Previously followed by Dr. Bill Mccall; plans to establish care with Dr. Jamir Grant on 11/11/21.  - Continue levothyroxine  - Continue hydrocortisone 15 mg qAM and 10 mg qPM  - Continue endocrinology follow-up as scheduled     ENT/GI  # GERD  - Continue PTA omeprazole  - TUMS and Pepto Bismol available PRN      # Vocal cord dysfunction  Manifested as hoarseness. Seen by ENT (  Kyree Bearden) on 6/21/21 and noted to have left true vocal fold motion impairment from mediastinal mass. Underwent injection of ProLaryn (filler/bulking agent) on 7/1/21, which reportedly has been minimally beneficial. Seen in follow-up by Dr. Bearden on 9/1/21 and noted to have minimal improvement; he was then referred to the [facility] ([facility]).   - No acute inpatient management issues  - Continue outpatient ENT follow-up as previously scheduled (next scheduled for 12/9/21)     DERM  # Rosacea  - Continue PTA metronidazole gel     FEN:  -IVF per chemotherapy protocol. Pt is receiving IV fluids at ~44 ml/hr with continuous chemotherapy. Monitor I/Os and weights. No e/o fluid overload on exam thus far.   -Lyte replacement per protocol  -Regular diet as tolerated     Prophy/Misc:  -GI: PTA omeprazole  -DVT: enoxaparin 40 mg daily  -Bowels: Senna/Miralax PRN     Disposition: Inpatient admission to Heme/Onc Team 3 for planned chemotherapy. Anticipate discharge to home pending completion of the chemotherapy regimen, likely 11/2/21.    Follow-up: Requested weekly labs, Neulasta injection, and MARLEEN follow-up; await scheduling.     Discussed with Dr. Smith.    Nathalia Peterson PA-C  Hematology/Oncology  Pager: [phone]    Interval History   No acute overnight events. Ifrah is doing well this morning. He is sitting up in chair, using his tablet. Reports feeling well overall. Does have some mild nausea this morning but he states this is not unusual for him. Usually gets up and moving with some relief. He is also scheduled to get his Zofran premed this morning and we discussed that there are additional options for antinausea medications if needed. Did have a headache yesterday which was relieved with Tylenol and remains resolved as of this morning. He denies SOB or difficulty breathing, cough, feeling of edema/fluid overload, abdominal pain, constipation, or diarrhea. Does endorse some itching at PICC site. Denies any new  feeling of mental fogginess or confusion. He has not appreciated any change to his baseline mild right facial droop. Hoping the time passes quickly here and he can return home to spend time with his family.       Physical Exam   Vital Signs with Ranges  Temp:  [96.5  F (35.8  C)-98  F (36.7  C)] 98  F (36.7  C)  Pulse:  [69-82] 72  Resp:  [16-18] 18  BP: (122-154)/(64-86) 146/80  SpO2:  [97 %-99 %] 97 %    I/O last 3 completed shifts:  In: 1662.8 [P.O.:880; I.V.:10; IV Piggyback:772.8]  Out: 1975 [Urine:1975]    Vitals:    10/26/21 1310 10/27/21 0832 10/28/21 0803   Weight: 73.2 kg (161 lb 4.8 oz) 71 kg (156 lb 8 oz) 71.3 kg (157 lb 1.6 oz)     Constitutional: Pleasant and cooperative male, sitting up in chair using tablet. Smiling and conversational. Awake, alert, NAD.  HEENT: NC/AT, EOMI, sclera clear, conjunctiva normal, OP with MMM. Occasional right eye droop/closure noted.  Respiratory: No increased work of breathing, on RA. Lungs CTAB, no crackles or wheezing.  Cardiovascular: RRR, no murmur noted. No peripheral edema.  GI: Normal bowel sounds, soft, non-distended and non-tender.  MSK: No gross deformities.  Skin: Clean, dry. No rashes or lesions on limited exam.  Neurologic: A&O. Answers questions appropriately. Voice is mildly hoarse. Moves all extremities spontaneously. Right facial droop noted at baseline, more pronounced with facial movements. No tremor..  Psych: Calm, appropriate affect  VAD: PICC in RUE, c/d/i.    Medications         celecoxib  200 mg Oral BID     Chemotherapy Infusing-Continuous Infusion   Does not apply Q8H     [START ON 11/2/2021] dextrose 5% water  10-20 mL Intracatheter Daily at 8 pm     [START ON 11/2/2021] dextrose 5% water  10-20 mL Intracatheter Daily at 8 pm     enoxaparin ANTICOAGULANT  40 mg Subcutaneous Q24H     [START ON 11/2/2021] filgrastim (NEUPOGEN/GRANIX) intravenous  5 mcg/kg (Treatment Plan Recorded) Intravenous Daily at 8 pm     heparin lock flush  5-20 mL  Intracatheter Q24H     heparin lock flush  5-20 mL Intracatheter Q24H     hydrocortisone  10 mg Oral Daily     hydrocortisone  15 mg Oral Daily     ifosfamide (IFEX) infusion  1,500 mg/m2 (Treatment Plan Recorded) Intravenous Q24H     levothyroxine  75 mcg Oral QAM     [START ON 11/1/2021] mesna (MESNEX) infusion  1,500 mg/m2 (Treatment Plan Recorded) Intravenous Once     metroNIDAZOLE   Topical BID     ondansetron  8 mg Oral Q8H     pantoprazole  40 mg Oral QAM AC     sodium phosphate  9 mmol Intravenous Once     rosuvastatin  40 mg Oral Daily     sodium chloride (PF)  10-40 mL Intracatheter Q8H     triamcinolone   Topical BID     Vitamin D3  1,000 Units Oral Daily       Antiinfectives  Anti-infectives (From now, onward)    None          Data   CBC   Recent Labs   Lab 10/28/21  0543 10/27/21  0605 10/26/21  1712 10/26/21  1338   WBC 10.4 10.5 11.0 12.4*   RBC 4.51 4.62 4.95 5.05   HGB 12.3* 12.7* 13.6 14.1   HCT 38.4* 40.0 42.5 43.4   MCV 85 87 86 86   MCH 27.3 27.5 27.5 27.9   MCHC 32.0 31.8 32.0 32.5   RDW 13.6 13.4 13.3 13.5    241 247 268       CMP   Recent Labs   Lab 10/28/21  0543 10/27/21  0605 10/26/21  1704 10/26/21  1338    140 140 139   POTASSIUM 3.8 3.8 4.0 4.1   CHLORIDE 111* 109 109 108   CO2 28 28 27 28   ANIONGAP 1* 3 4 3   GLC 97 92 126* 114*   BUN 16 16 16 16   CR 0.81 0.77 0.73 0.78   GFRESTIMATED 88 90 >90 90   COTY 8.7 8.7 9.1 9.7   MAG 2.3 2.3  --  2.4*   PHOS 2.4* 2.6  --  2.6   PROTTOTAL 6.3* 6.3* 7.0 7.6   ALBUMIN 2.7* 2.8* 3.3* 3.7   BILITOTAL 0.7 0.4 0.3 0.4   ALKPHOS 139 140 160* 169*   AST 34 40 32 36   ALT 44 44 44 44       LFTs   Recent Labs   Lab 10/28/21  0543   PROTTOTAL 6.3*   ALBUMIN 2.7*   BILITOTAL 0.7   ALKPHOS 139   AST 34   ALT 44

## 2021-10-28 NOTE — PROGRESS NOTES
Chemotherapy    D: Blood return is brisk via R-PICC. Urine output is 1,175ml as recorded in intake and output flowsheet.     I: Dose #2 of Ifosfamide/Mesna started to infuse over 24hrs. Chemotherapy double checked per protocol by two chemotherapy competent RN's. Reviewed pt teaching on chemotherapy side effects. Pt denies need for further teaching.     A: Tolerating chemotherapy well. Denies N/V.    P: Continue to monitor urine output and symptoms of nausea. Screen for symptoms of toxicity.

## 2021-10-28 NOTE — PLAN OF CARE
[id number]:     Day 2 Ifos/Mesna infusing via PICC, brisk blood returns. Baseline right sided facial twitch/droop, other neuros intact.    Hypertensive. Parameters to notify not met. OVSS on RA. Shoulder/back pain managed with Bengay cream. Denies nausea and SOB. Up independently. Continue with POC.

## 2021-10-28 NOTE — PLAN OF CARE
[id number]:  Dose #2 CIVI ifosfamide/mesna infusing without incident.  Positive blood return noted ~q4 hrs.  Neuro remain intact per baseline.  Fatigued, rested in between cares.  C/o nausea, on scheduled Zofran and given prn Compazine x 1.  Denies pain, chest pain and SOB.  Good PO intake.  LBM 10/27. Up ad kaitlynn, frequently ambulates.  Friend visiting, pt will do CHG wipe down after visit.  Phosphorous 2.4, replacement currently infusing, recheck phos level tomorrow (10/29) with AM labs.  Continue to monitor and with POC.

## 2021-10-28 NOTE — PLAN OF CARE
[id number]:    A/Ox4. Afebrile. Intermittently hypertensive, but within parameters. OVSS on RA. Baseline right sided facial twitch/droop, others neuros intact. Tylenol given x1 for headache and left lower back pain with effect. Denies SOB and N/V. Good appetite. Pt voiding spontaneously with adequate UOP. R-PICC infusing Ifosfamide/Mesna. UAL. Continue with POC.

## 2021-10-29 LAB
ALBUMIN SERPL-MCNC: 2.7 G/DL (ref 3.4–5)
ALP SERPL-CCNC: 148 U/L (ref 40–150)
ALT SERPL W P-5'-P-CCNC: 34 U/L (ref 0–70)
ANION GAP SERPL CALCULATED.3IONS-SCNC: 4 MMOL/L (ref 3–14)
AST SERPL W P-5'-P-CCNC: 24 U/L (ref 0–45)
BASOPHILS # BLD AUTO: 0 10E3/UL (ref 0–0.2)
BASOPHILS NFR BLD AUTO: 0 %
BILIRUB SERPL-MCNC: 0.4 MG/DL (ref 0.2–1.3)
BUN SERPL-MCNC: 15 MG/DL (ref 7–30)
CALCIUM SERPL-MCNC: 9 MG/DL (ref 8.5–10.1)
CHLORIDE BLD-SCNC: 108 MMOL/L (ref 94–109)
CO2 SERPL-SCNC: 28 MMOL/L (ref 20–32)
CREAT SERPL-MCNC: 0.78 MG/DL (ref 0.66–1.25)
EOSINOPHIL # BLD AUTO: 0.4 10E3/UL (ref 0–0.7)
EOSINOPHIL NFR BLD AUTO: 4 %
ERYTHROCYTE [DISTWIDTH] IN BLOOD BY AUTOMATED COUNT: 13.8 % (ref 10–15)
GFR SERPL CREATININE-BSD FRML MDRD: 90 ML/MIN/1.73M2
GLUCOSE BLD-MCNC: 86 MG/DL (ref 70–99)
HCT VFR BLD AUTO: 40.1 % (ref 40–53)
HGB BLD-MCNC: 12.6 G/DL (ref 13.3–17.7)
IMM GRANULOCYTES # BLD: 0 10E3/UL
IMM GRANULOCYTES NFR BLD: 0 %
LYMPHOCYTES # BLD AUTO: 1.7 10E3/UL (ref 0.8–5.3)
LYMPHOCYTES NFR BLD AUTO: 17 %
MAGNESIUM SERPL-MCNC: 2.3 MG/DL (ref 1.6–2.3)
MCH RBC QN AUTO: 27.5 PG (ref 26.5–33)
MCHC RBC AUTO-ENTMCNC: 31.4 G/DL (ref 31.5–36.5)
MCV RBC AUTO: 88 FL (ref 78–100)
MONOCYTES # BLD AUTO: 1.2 10E3/UL (ref 0–1.3)
MONOCYTES NFR BLD AUTO: 13 %
NEUTROPHILS # BLD AUTO: 6.4 10E3/UL (ref 1.6–8.3)
NEUTROPHILS NFR BLD AUTO: 66 %
NRBC # BLD AUTO: 0 10E3/UL
NRBC BLD AUTO-RTO: 0 /100
PHOSPHATE SERPL-MCNC: 2.3 MG/DL (ref 2.5–4.5)
PLATELET # BLD AUTO: 225 10E3/UL (ref 150–450)
POTASSIUM BLD-SCNC: 3.9 MMOL/L (ref 3.4–5.3)
PROT SERPL-MCNC: 6.3 G/DL (ref 6.8–8.8)
RBC # BLD AUTO: 4.58 10E6/UL (ref 4.4–5.9)
SODIUM SERPL-SCNC: 140 MMOL/L (ref 133–144)
WBC # BLD AUTO: 9.8 10E3/UL (ref 4–11)

## 2021-10-29 PROCEDURE — 85025 COMPLETE CBC W/AUTO DIFF WBC: CPT | Performed by: PHYSICIAN ASSISTANT

## 2021-10-29 PROCEDURE — 250N000011 HC RX IP 250 OP 636: Performed by: PHYSICIAN ASSISTANT

## 2021-10-29 PROCEDURE — 250N000013 HC RX MED GY IP 250 OP 250 PS 637: Performed by: PHYSICIAN ASSISTANT

## 2021-10-29 PROCEDURE — 250N000011 HC RX IP 250 OP 636: Performed by: INTERNAL MEDICINE

## 2021-10-29 PROCEDURE — 99232 SBSQ HOSP IP/OBS MODERATE 35: CPT | Performed by: INTERNAL MEDICINE

## 2021-10-29 PROCEDURE — 250N000009 HC RX 250: Performed by: INTERNAL MEDICINE

## 2021-10-29 PROCEDURE — 80053 COMPREHEN METABOLIC PANEL: CPT | Performed by: PHYSICIAN ASSISTANT

## 2021-10-29 PROCEDURE — 36592 COLLECT BLOOD FROM PICC: CPT | Performed by: PHYSICIAN ASSISTANT

## 2021-10-29 PROCEDURE — 84100 ASSAY OF PHOSPHORUS: CPT | Performed by: PHYSICIAN ASSISTANT

## 2021-10-29 PROCEDURE — 258N000003 HC RX IP 258 OP 636: Performed by: INTERNAL MEDICINE

## 2021-10-29 PROCEDURE — 120N000002 HC R&B MED SURG/OB UMMC

## 2021-10-29 PROCEDURE — 83735 ASSAY OF MAGNESIUM: CPT | Performed by: PHYSICIAN ASSISTANT

## 2021-10-29 RX ORDER — SIMETHICONE 80 MG
80-160 TABLET,CHEWABLE ORAL 4 TIMES DAILY PRN
Status: DISCONTINUED | OUTPATIENT
Start: 2021-10-29 | End: 2021-11-02 | Stop reason: HOSPADM

## 2021-10-29 RX ORDER — CALCIUM CARBONATE 500 MG/1
500-1500 TABLET, CHEWABLE ORAL 3 TIMES DAILY PRN
Status: DISCONTINUED | OUTPATIENT
Start: 2021-10-29 | End: 2021-10-29

## 2021-10-29 RX ADMIN — METRONIDAZOLE: 7.5 CREAM TOPICAL at 09:56

## 2021-10-29 RX ADMIN — ENOXAPARIN SODIUM 40 MG: 40 INJECTION SUBCUTANEOUS at 22:17

## 2021-10-29 RX ADMIN — ONDANSETRON HYDROCHLORIDE 8 MG: 8 TABLET, FILM COATED ORAL at 04:06

## 2021-10-29 RX ADMIN — ONDANSETRON HYDROCHLORIDE 8 MG: 8 TABLET, FILM COATED ORAL at 12:20

## 2021-10-29 RX ADMIN — ONDANSETRON HYDROCHLORIDE 8 MG: 8 TABLET, FILM COATED ORAL at 19:47

## 2021-10-29 RX ADMIN — PROCHLORPERAZINE MALEATE 5 MG: 5 TABLET ORAL at 18:16

## 2021-10-29 RX ADMIN — ACETAMINOPHEN 650 MG: 325 TABLET, FILM COATED ORAL at 20:31

## 2021-10-29 RX ADMIN — LEVOTHYROXINE SODIUM 75 MCG: 0.05 TABLET ORAL at 06:15

## 2021-10-29 RX ADMIN — MESNA 2790 MG: 100 INJECTION, SOLUTION INTRAVENOUS at 20:23

## 2021-10-29 RX ADMIN — TRIAMCINOLONE ACETONIDE: 1 CREAM TOPICAL at 22:17

## 2021-10-29 RX ADMIN — Medication 5 ML: at 06:21

## 2021-10-29 RX ADMIN — CELECOXIB 200 MG: 200 CAPSULE ORAL at 19:46

## 2021-10-29 RX ADMIN — Medication: at 23:58

## 2021-10-29 RX ADMIN — Medication 9 MMOL: at 15:42

## 2021-10-29 RX ADMIN — METRONIDAZOLE: 7.5 CREAM TOPICAL at 22:17

## 2021-10-29 RX ADMIN — PROCHLORPERAZINE EDISYLATE 5 MG: 5 INJECTION INTRAMUSCULAR; INTRAVENOUS at 09:51

## 2021-10-29 RX ADMIN — Medication 1000 UNITS: at 12:20

## 2021-10-29 RX ADMIN — HYDROCORTISONE 10 MG: 10 TABLET ORAL at 15:37

## 2021-10-29 RX ADMIN — PANTOPRAZOLE SODIUM 40 MG: 40 TABLET, DELAYED RELEASE ORAL at 08:03

## 2021-10-29 RX ADMIN — ROSUVASTATIN CALCIUM 40 MG: 40 TABLET, COATED ORAL at 22:17

## 2021-10-29 RX ADMIN — CALCIUM CARBONATE 1000 MG: 500 TABLET, CHEWABLE ORAL at 15:37

## 2021-10-29 RX ADMIN — CELECOXIB 200 MG: 200 CAPSULE ORAL at 12:20

## 2021-10-29 RX ADMIN — TRIAMCINOLONE ACETONIDE: 1 CREAM TOPICAL at 09:56

## 2021-10-29 RX ADMIN — HYDROCORTISONE 15 MG: 5 TABLET ORAL at 12:20

## 2021-10-29 ASSESSMENT — ACTIVITIES OF DAILY LIVING (ADL)
ADLS_ACUITY_SCORE: 4

## 2021-10-29 ASSESSMENT — MIFFLIN-ST. JEOR: SCORE: 1422.13

## 2021-10-29 NOTE — PLAN OF CARE
2300 - 0700  VS stable, afebrile. Pt denied pain/SOB/n/v. CIVI chemo infusing without concern, good blood returns noted q4h. No significant events, continue POC.

## 2021-10-29 NOTE — PROGRESS NOTES
Nursing Focus: Chemotherapy  D: Positive brisk bright red blood return via PICC. Insertion site is clean/dry/intact, dressing intact with no complaints of pain.  Urine output is recorded in intake Doc Flowsheet.    I: On scheduled zofran given per order (see electronic medical administration record). Dose #3 of Ifosfamide/Mesna started to infuse over 24 hours. Reviewed pt teaching on chemotherapy side effects.  Pt denies need for further teaching. Chemotherapy double checked per protocol by two chemotherapy competent RN's.   A: Tolerating chemo well. Denies nausea.   P: Continue to monitor urine output and symptoms of nausea. Screen for symptoms of toxicity.

## 2021-10-29 NOTE — PLAN OF CARE
[id number]:  Dose #3 CIVI ifosfamide/mesna infusing without incident.  Positive blood return noted ~q4 hrs.  Neuro remain intact per baseline.  Fatigued, rested in between cares.  Poor appetite r/t nausea.  C/o nausea, on scheduled Zofran and given prn IV Compazine x 1.  Afebrile.  Hypertensive BPs PA-C aware.  OVSS on room air.  Denies pain, chest pain and SOB.  Good PO intake.  LBM 10/28. Up ad kaitlynn, frequently ambulates.  Pt will do CHG wipe later this afternoon.  Phosphorous 2.3, needs to be replaced awaiting medicaion from pharmacy.  Continue to monitor and with POC.

## 2021-10-29 NOTE — PROGRESS NOTES
[facility]    Progress Note  Hematology / Oncology     Date of Admission:  10/26/2021  Hospital Day #: 3   Date of Service (when I saw the patient): 10/29/2021    Assessment & Plan   Ifrah Huitron is a 71 year old male with PMH rosacea, pituitary macroadenoma s/p resection, CAD, GERD, kidney stones, DJD, and metastatic spindle cell sarcoma who was admitted to begin treatment with Cycle #1 of Doxil + ifosfamide.      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 Wolff. Patient presented to his PCP in 03/2021 with chest/left shoulder and back pain that led to imaging which revealed multiple lung mets, included a left pleural mass. There were difficulties in getting diagnostic biopsy; eventually, biopsy of the mediastinal mass (5/20/21) revealed a poorly characterized malignant spindle cell neoplasm with high PD-L1 expression (90%), Ki-67 70%. 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 Baseline imaging (6/21/21) revealed progression of lung mets and left-sided subdiaphragmatic mass, stable liver lesions. He was seen by ENT for hoarseness, which was attributed to left true vocal fold motion impairment from mediastinal mass. Given high PD-L1 expression, he was started on Keytruda (C1=6/21/21). Interval imaging (CT 8/2/21) revealed positive response to treatment. He received 6 cycles total, most recently C6=10/4/21. Unfortunately, restaging imaging (10/18/21) revealed interval increase in size of the LUQ/splenic mass; however, the mediastinal and left pleural mass were stable to slightly decreased in size. He met with Dr. Wolff, who recommended a change in therapy to Doxil + ifosfamide. He was admitted on 10/27/21 to receive Cycle #1 of Doxil/ifos.  - PICC placed on admission; will remove on discharge.                                                         Treatment Plan: Doxil + ifosfamide (C1D1=10/26/21). Today will be Day 4.                             - Doxil 45 mg/m2 (80mg) IV once - D1                             - Ifosfamide/Mesna 1500 mg/m2 (2790 mg) CIVI - D1-6                             - Mesna 1500 mg/m2 (2790 mg) IV over 18 hrs - starting D7                             - Neulasta injection - D8, requested     # Cancer-related pain  - Continue PTA Celebrex 200 mg BID   - Continue PTA topical Bengay PRN at bedtime to left back     # Anemia, mild.  Likely related to chemo, hydration, blood draw.   - monitor  - would plan to transfuse if Hgb <7     GI  # Nausea  # Abdominal gas discomfort  Intermittent nausea throughout course thus far. Also with some abdominal discomfort due to gas (noted on 10/29 AM). He is having bowel movements, last on 10/28 was soft but no watery diarrhea.   - scheduled Zofran with additional PRN antiemetics available  - simethicone PRN      # GERD  - Continue PTA omeprazole  - TUMS and Pepto Bismol available PRN     CARDS   # CAD   Follows with Dr. Gigi Vernon at [facility]. He was noted to have coronary calcium on chest CT. CTA 9/29/21 with moderate mid LAD stenosis. Small to moderate caliber ramus branch with severe diffuse disease.  Echo completed 9/29/21 with normal EF and no valvular dysfunction. Repeat ECHO on 10/27 with EF 60-65%, early diastolic dysfunction but otherwise no change from previous.  - Continue PTA rosuvastatin 40 mg daily per patient preference; monitor transaminases closely in the setting of concomitant chemotherapy administration. WNL at this time.  - Encourage outpatient cardiology follow-up as recommended     # Hypertension  BPs ranging from normotensive to hypertensive (with SBP 140s-160) this admission. Asymptomatic.   - monitor     ENDO  # Panhypopituitarism  # Macroadenoma of the pituitary gland  Diagnosed in 2010. Found to have a pituitary macroadenoma, 1.9 cm in largest dimension.  Underwent hypophysectomy on 8/23/10. Subsequent presumptive pituitary deficiency, on empiric meds for adrenal insufficiency and thyroid. Previously followed by Dr. Bill Mccall; plans to establish care with Dr. Jamir Grant on 11/11/21.  - Continue levothyroxine  - Continue hydrocortisone 15 mg qAM and 10 mg qPM  - Continue endocrinology follow-up as scheduled     ENT  # Vocal cord dysfunction  Manifested as hoarseness. Seen by ENT (Dr. Kyree Beraden) on 6/21/21 and noted to have left true vocal fold motion impairment from mediastinal mass. Underwent injection of ProLaryn (filler/bulking agent) on 7/1/21, which reportedly has been minimally beneficial. Seen in follow-up by Dr. Bearden on 9/1/21 and noted to have minimal improvement; he was then referred to the [facility] ([facility]).   - No acute inpatient management issues  - Continue outpatient ENT follow-up as previously scheduled (next scheduled for 12/9/21)     DERM  # Rosacea  - Continue PTA metronidazole gel     FEN:  -IVF per chemotherapy protocol. Pt is receiving IV fluids at ~44 ml/hr with continuous chemotherapy. Monitor I/Os and weights. No e/o fluid overload on exam thus far.   -Lyte replacement per protocol  -Regular diet as tolerated     Prophy/Misc:  -GI: PTA omeprazole  -DVT: enoxaparin 40 mg daily  -Bowels: Senna/Miralax PRN     Disposition: Inpatient admission to Heme/Onc Team 3 for planned chemotherapy. Anticipate discharge to home pending completion of the chemotherapy regimen, likely 11/2/21.    Follow-up:   - labs at local [facility] for labs 11/11 and 11/18  - 11/11 outpt Endocrine appt  - 11/22 [facility] labs and CT CAP  - 11/23 virtual [facility] visit    Discussed with Dr. Smith.    Nathalia Peterson PA-C  Hematology/Oncology  Pager: [phone]    Interval History   No acute overnight events. Ifrah is doing pretty well overall. He was just about to go out for a walk around the unit to try to get some abdominal gas moving. He has some  "mild discomfort due to this. He also just took his Protonix so will see if this improves symptoms. Has been having intermittent nausea with the chemo. Having regular BMs; he had 3 yesterday and the last one was softer but not diarrhea. Denies SOB or difficulty breathing, denies feeling of extremity edema. Felt a little \"achy\" yesterday, including mild headache, resolved with Tylenol. Ongoing mild itching at PICC site but the line is otherwise ok.        Physical Exam   Vital Signs with Ranges  Temp:  [96  F (35.6  C)-98.6  F (37  C)] 96  F (35.6  C)  Pulse:  [75-88] 86  Resp:  [16-18] 17  BP: (131-160)/(76-94) 155/94  SpO2:  [97 %-100 %] 97 %    I/O last 3 completed shifts:  In: 896.4 [P.O.:240; I.V.:270; IV Piggyback:386.4]  Out: 3460 [Urine:3460]    Vitals:    10/27/21 0832 10/28/21 0803 10/29/21 0829   Weight: 71 kg (156 lb 8 oz) 71.3 kg (157 lb 1.6 oz) 71.8 kg (158 lb 6.4 oz)     Constitutional: Pleasant and cooperative male, up ad kaitlynn in room and later seen walking around unit. Smiling and conversational. NAD.  HEENT: NC/AT, EOMI, sclera clear, conjunctiva normal, OP with MMM. Occasional right eye droop/closure noted.  Respiratory: No increased work of breathing, on RA. Lungs CTAB, no crackles or wheezing.  Cardiovascular: RRR, no murmur noted. No peripheral edema.  GI: Normal bowel sounds, soft, non-distended and non-tender.  MSK: No gross deformities.  Skin: Clean, dry. No rashes or lesions on limited exam.  Neurologic: A&O. Answers questions appropriately. Voice is mildly hoarse. Moves all extremities spontaneously. Right facial droop noted at baseline, more pronounced with facial movements. No tremor.  Psych: Calm, appropriate affect  VAD: PICC in RUE, c/d/i.      Medications         celecoxib  200 mg Oral BID     Chemotherapy Infusing-Continuous Infusion   Does not apply Q8H     [START ON 11/2/2021] dextrose 5% water  10-20 mL Intracatheter Daily at 8 pm     [START ON 11/2/2021] dextrose 5% water  10-20 mL " Intracatheter Daily at 8 pm     enoxaparin ANTICOAGULANT  40 mg Subcutaneous Q24H     [START ON 11/2/2021] filgrastim (NEUPOGEN/GRANIX) intravenous  5 mcg/kg (Treatment Plan Recorded) Intravenous Daily at 8 pm     heparin lock flush  5-20 mL Intracatheter Q24H     heparin lock flush  5-20 mL Intracatheter Q24H     hydrocortisone  10 mg Oral Daily     hydrocortisone  15 mg Oral Daily     ifosfamide (IFEX) infusion  1,500 mg/m2 (Treatment Plan Recorded) Intravenous Q24H     levothyroxine  75 mcg Oral QAM     [START ON 11/1/2021] mesna (MESNEX) infusion  1,500 mg/m2 (Treatment Plan Recorded) Intravenous Once     metroNIDAZOLE   Topical BID     ondansetron  8 mg Oral Q8H     pantoprazole  40 mg Oral QAM AC     rosuvastatin  40 mg Oral Daily     sodium chloride (PF)  10-40 mL Intracatheter Q8H     triamcinolone   Topical BID     Vitamin D3  1,000 Units Oral Daily       Antiinfectives  Anti-infectives (From now, onward)    None          Data   CBC   Recent Labs   Lab 10/29/21  0627 10/28/21  0543 10/27/21  0605 10/26/21  1712   WBC 9.8 10.4 10.5 11.0   RBC 4.58 4.51 4.62 4.95   HGB 12.6* 12.3* 12.7* 13.6   HCT 40.1 38.4* 40.0 42.5   MCV 88 85 87 86   MCH 27.5 27.3 27.5 27.5   MCHC 31.4* 32.0 31.8 32.0   RDW 13.8 13.6 13.4 13.3    209 241 247       CMP   Recent Labs   Lab 10/29/21  0627 10/28/21  0824 10/28/21  0543 10/27/21  0605 10/26/21  1704 10/26/21  1338 10/26/21  1338     --  140 140 140   < > 139   POTASSIUM 3.9  --  3.8 3.8 4.0   < > 4.1   CHLORIDE 108  --  111* 109 109   < > 108   CO2 28  --  28 28 27   < > 28   ANIONGAP 4  --  1* 3 4   < > 3   GLC 86  --  97 92 126*   < > 114*   BUN 15  --  16 16 16   < > 16   CR 0.78  --  0.81 0.77 0.73   < > 0.78   GFRESTIMATED 90  --  88 90 >90   < > 90   COTY 9.0  --  8.7 8.7 9.1   < > 9.7   MAG 2.3  --  2.3 2.3  --   --  2.4*   PHOS 2.3* 2.4* 2.4* 2.6  --    < > 2.6   PROTTOTAL 6.3*  --  6.3* 6.3* 7.0   < > 7.6   ALBUMIN 2.7*  --  2.7* 2.8* 3.3*   < > 3.7    BILITOTAL 0.4  --  0.7 0.4 0.3   < > 0.4   ALKPHOS 148  --  139 140 160*   < > 169*   AST 24  --  34 40 32   < > 36   ALT 34  --  44 44 44   < > 44    < > = values in this interval not displayed.       LFTs   Recent Labs   Lab 10/29/21  0627   PROTTOTAL 6.3*   ALBUMIN 2.7*   BILITOTAL 0.4   ALKPHOS 148   AST 24   ALT 34

## 2021-10-29 NOTE — PLAN OF CARE
VSS on RA, hypertensive but within parameters. Alert and oriented x 4, R facial droop noted per baseline. On regular diet with occasional nausea managed well with scheduled Zofran.   Complaining of mild pain on upper back, scheduled Celebrex given.  Denies chest pain and SOB. R DL PICC infusing Ifosfamide/Mesna. Voiding spontaneously with good output. Up independent. Continue with plan of care.

## 2021-10-30 LAB
ALBUMIN SERPL-MCNC: 2.8 G/DL (ref 3.4–5)
ALP SERPL-CCNC: 149 U/L (ref 40–150)
ALT SERPL W P-5'-P-CCNC: 31 U/L (ref 0–70)
ANION GAP SERPL CALCULATED.3IONS-SCNC: 6 MMOL/L (ref 3–14)
AST SERPL W P-5'-P-CCNC: 18 U/L (ref 0–45)
BASOPHILS # BLD AUTO: 0.1 10E3/UL (ref 0–0.2)
BASOPHILS NFR BLD AUTO: 1 %
BILIRUB SERPL-MCNC: 0.5 MG/DL (ref 0.2–1.3)
BUN SERPL-MCNC: 14 MG/DL (ref 7–30)
CALCIUM SERPL-MCNC: 9.3 MG/DL (ref 8.5–10.1)
CHLORIDE BLD-SCNC: 108 MMOL/L (ref 94–109)
CO2 SERPL-SCNC: 25 MMOL/L (ref 20–32)
CREAT SERPL-MCNC: 0.82 MG/DL (ref 0.66–1.25)
EOSINOPHIL # BLD AUTO: 0.4 10E3/UL (ref 0–0.7)
EOSINOPHIL NFR BLD AUTO: 4 %
ERYTHROCYTE [DISTWIDTH] IN BLOOD BY AUTOMATED COUNT: 13.7 % (ref 10–15)
GFR SERPL CREATININE-BSD FRML MDRD: 88 ML/MIN/1.73M2
GLUCOSE BLD-MCNC: 84 MG/DL (ref 70–99)
HCT VFR BLD AUTO: 40 % (ref 40–53)
HGB BLD-MCNC: 12.8 G/DL (ref 13.3–17.7)
IMM GRANULOCYTES # BLD: 0.1 10E3/UL
IMM GRANULOCYTES NFR BLD: 1 %
LYMPHOCYTES # BLD AUTO: 1.8 10E3/UL (ref 0.8–5.3)
LYMPHOCYTES NFR BLD AUTO: 21 %
MAGNESIUM SERPL-MCNC: 2.4 MG/DL (ref 1.6–2.3)
MCH RBC QN AUTO: 27.7 PG (ref 26.5–33)
MCHC RBC AUTO-ENTMCNC: 32 G/DL (ref 31.5–36.5)
MCV RBC AUTO: 87 FL (ref 78–100)
MONOCYTES # BLD AUTO: 1 10E3/UL (ref 0–1.3)
MONOCYTES NFR BLD AUTO: 12 %
NEUTROPHILS # BLD AUTO: 5.3 10E3/UL (ref 1.6–8.3)
NEUTROPHILS NFR BLD AUTO: 61 %
NRBC # BLD AUTO: 0 10E3/UL
NRBC BLD AUTO-RTO: 0 /100
PHOSPHATE SERPL-MCNC: 2.7 MG/DL (ref 2.5–4.5)
PLATELET # BLD AUTO: 211 10E3/UL (ref 150–450)
POTASSIUM BLD-SCNC: 3.7 MMOL/L (ref 3.4–5.3)
PROT SERPL-MCNC: 6.5 G/DL (ref 6.8–8.8)
RBC # BLD AUTO: 4.62 10E6/UL (ref 4.4–5.9)
SODIUM SERPL-SCNC: 139 MMOL/L (ref 133–144)
WBC # BLD AUTO: 8.7 10E3/UL (ref 4–11)

## 2021-10-30 PROCEDURE — 99232 SBSQ HOSP IP/OBS MODERATE 35: CPT | Performed by: STUDENT IN AN ORGANIZED HEALTH CARE EDUCATION/TRAINING PROGRAM

## 2021-10-30 PROCEDURE — 250N000011 HC RX IP 250 OP 636: Performed by: PHYSICIAN ASSISTANT

## 2021-10-30 PROCEDURE — 250N000013 HC RX MED GY IP 250 OP 250 PS 637: Performed by: PHYSICIAN ASSISTANT

## 2021-10-30 PROCEDURE — 85025 COMPLETE CBC W/AUTO DIFF WBC: CPT | Performed by: PHYSICIAN ASSISTANT

## 2021-10-30 PROCEDURE — 999N000128 HC STATISTIC PERIPHERAL IV START W/O US GUIDANCE

## 2021-10-30 PROCEDURE — 250N000011 HC RX IP 250 OP 636: Performed by: INTERNAL MEDICINE

## 2021-10-30 PROCEDURE — 250N000013 HC RX MED GY IP 250 OP 250 PS 637: Performed by: STUDENT IN AN ORGANIZED HEALTH CARE EDUCATION/TRAINING PROGRAM

## 2021-10-30 PROCEDURE — 84100 ASSAY OF PHOSPHORUS: CPT | Performed by: PHYSICIAN ASSISTANT

## 2021-10-30 PROCEDURE — 82040 ASSAY OF SERUM ALBUMIN: CPT | Performed by: PHYSICIAN ASSISTANT

## 2021-10-30 PROCEDURE — 83735 ASSAY OF MAGNESIUM: CPT | Performed by: PHYSICIAN ASSISTANT

## 2021-10-30 PROCEDURE — 258N000003 HC RX IP 258 OP 636: Performed by: INTERNAL MEDICINE

## 2021-10-30 PROCEDURE — 120N000002 HC R&B MED SURG/OB UMMC

## 2021-10-30 PROCEDURE — 36592 COLLECT BLOOD FROM PICC: CPT | Performed by: PHYSICIAN ASSISTANT

## 2021-10-30 RX ORDER — HYDRALAZINE HYDROCHLORIDE 20 MG/ML
10-20 INJECTION INTRAMUSCULAR; INTRAVENOUS EVERY 6 HOURS PRN
Status: DISCONTINUED | OUTPATIENT
Start: 2021-10-30 | End: 2021-11-02 | Stop reason: HOSPADM

## 2021-10-30 RX ADMIN — Medication 1000 UNITS: at 08:00

## 2021-10-30 RX ADMIN — ROSUVASTATIN CALCIUM 40 MG: 40 TABLET, COATED ORAL at 22:10

## 2021-10-30 RX ADMIN — METRONIDAZOLE: 7.5 CREAM TOPICAL at 10:29

## 2021-10-30 RX ADMIN — ONDANSETRON HYDROCHLORIDE 8 MG: 8 TABLET, FILM COATED ORAL at 11:25

## 2021-10-30 RX ADMIN — CELECOXIB 200 MG: 200 CAPSULE ORAL at 08:00

## 2021-10-30 RX ADMIN — CALCIUM CARBONATE 1000 MG: 500 TABLET, CHEWABLE ORAL at 08:44

## 2021-10-30 RX ADMIN — MESNA 2790 MG: 100 INJECTION, SOLUTION INTRAVENOUS at 20:24

## 2021-10-30 RX ADMIN — CALCIUM CARBONATE 1000 MG: 500 TABLET, CHEWABLE ORAL at 18:05

## 2021-10-30 RX ADMIN — METRONIDAZOLE: 7.5 CREAM TOPICAL at 22:11

## 2021-10-30 RX ADMIN — SODIUM CHLORIDE, PRESERVATIVE FREE 5 ML: 5 INJECTION INTRAVENOUS at 20:35

## 2021-10-30 RX ADMIN — CELECOXIB 200 MG: 200 CAPSULE ORAL at 19:52

## 2021-10-30 RX ADMIN — TRIAMCINOLONE ACETONIDE: 1 CREAM TOPICAL at 22:11

## 2021-10-30 RX ADMIN — TRIAMCINOLONE ACETONIDE: 1 CREAM TOPICAL at 10:29

## 2021-10-30 RX ADMIN — ACETAMINOPHEN 650 MG: 325 TABLET, FILM COATED ORAL at 18:58

## 2021-10-30 RX ADMIN — PANTOPRAZOLE SODIUM 40 MG: 40 TABLET, DELAYED RELEASE ORAL at 08:00

## 2021-10-30 RX ADMIN — ONDANSETRON HYDROCHLORIDE 8 MG: 8 TABLET, FILM COATED ORAL at 04:04

## 2021-10-30 RX ADMIN — PROCHLORPERAZINE MALEATE 5 MG: 5 TABLET ORAL at 08:05

## 2021-10-30 RX ADMIN — PROCHLORPERAZINE MALEATE 5 MG: 5 TABLET ORAL at 22:10

## 2021-10-30 RX ADMIN — ONDANSETRON HYDROCHLORIDE 8 MG: 8 TABLET, FILM COATED ORAL at 19:52

## 2021-10-30 RX ADMIN — BISMUTH SUBSALICYLATE 15 ML: 525 LIQUID ORAL at 21:25

## 2021-10-30 RX ADMIN — HYDROCORTISONE 15 MG: 5 TABLET ORAL at 07:59

## 2021-10-30 RX ADMIN — BISMUTH SUBSALICYLATE 15 ML: 525 LIQUID ORAL at 10:27

## 2021-10-30 RX ADMIN — PROCHLORPERAZINE MALEATE 5 MG: 5 TABLET ORAL at 15:04

## 2021-10-30 RX ADMIN — LEVOTHYROXINE SODIUM 75 MCG: 0.05 TABLET ORAL at 06:52

## 2021-10-30 RX ADMIN — Medication 5 ML: at 13:48

## 2021-10-30 RX ADMIN — ACETAMINOPHEN 650 MG: 325 TABLET, FILM COATED ORAL at 02:15

## 2021-10-30 RX ADMIN — HYDROCORTISONE 10 MG: 10 TABLET ORAL at 15:58

## 2021-10-30 RX ADMIN — CALCIUM CARBONATE 1000 MG: 500 TABLET, CHEWABLE ORAL at 00:02

## 2021-10-30 RX ADMIN — ENOXAPARIN SODIUM 40 MG: 40 INJECTION SUBCUTANEOUS at 22:11

## 2021-10-30 RX ADMIN — SALINE NASAL SPRAY 1 SPRAY: 1.5 SOLUTION NASAL at 23:24

## 2021-10-30 RX ADMIN — BISMUTH SUBSALICYLATE 15 ML: 525 LIQUID ORAL at 15:04

## 2021-10-30 ASSESSMENT — ACTIVITIES OF DAILY LIVING (ADL)
ADLS_ACUITY_SCORE: 4

## 2021-10-30 ASSESSMENT — MIFFLIN-ST. JEOR: SCORE: 1397.18

## 2021-10-30 NOTE — PROGRESS NOTES
Nursing Focus: Chemotherapy  D: Positive brisk bright red blood return via PICC. Insertion site is clean/dry/intact, dressing intact with no complaints of pain.  Urine output is recorded in intake Doc Flowsheet.    I: On scheduled zofran given per order (see electronic medical administration record). Dose #4 of Ifosfamide/Mesna started to infuse over 24 hours. Reviewed pt teaching on chemotherapy side effects.  Pt denies need for further teaching. Chemotherapy double checked per protocol by two chemotherapy competent RN's.   A: Tolerating chemo well. Denies nausea.   P: Continue to monitor urine output and symptoms of nausea. Screen for symptoms of toxicity.

## 2021-10-30 NOTE — PLAN OF CARE
Ifrah currently has C1 C4 Ifos/Mesna running CIVI via PICC with + blood return.  Continues with nausea despite scheduled Zofran and prn Compazine.  Also with heartburn - took Tums and Pepto bismol with relief.  Denies pain and UAL in room.  Hypertensive 150/90's  PRN Hydralazine for greater than 170/100

## 2021-10-30 NOTE — PROGRESS NOTES
Attestation signed by Murali De La O MD at 10/30/2021 1:10 PM   Attestation:  Physician Attestation   I, Murali De La O, saw and evaluated Ifrah Huitron as part of a shared visit.  I have reviewed and discussed with the advanced practice provider their history, physical and plan.     I personally reviewed the vital signs, medications, labs, and imaging.     My key history or physical exam findings:  70 yo man with recently diagnosed metastatic spindle cell sarcoma, admitted for cycle 1 of liposomal doxorubicin/ ifosafamide.  Sleepy this AM, some nausea. Had shoes on while sleeping.  Ambulating.  Some headache but no ataxia/ dizziness concerning for neurotoxic effects of chemo.  [employer] game fan-- enjoys playing with [employer].     Key management decisions made by me: Continue chemo and supportive care, receiving mesna, arranging G-CSF post chemo.     Murali De La O  Date of Service (when I saw the patient): 10/30/21         [facility]    Progress Note  Hematology / Oncology     Date of Admission:  10/26/2021  Hospital Day #: 4   Date of Service (when I saw the patient): 10/30/2021    Assessment & Plan   Ifrah Huitron is a 71 year old male with PMH rosacea, pituitary macroadenoma s/p resection, CAD, GERD, kidney stones, DJD, and metastatic spindle cell sarcoma who was admitted to begin treatment with Cycle #1 of Doxil + ifosfamide.      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 Wolff. Patient presented to his PCP in 03/2021 with chest/left shoulder and back pain that led to imaging which revealed multiple lung mets, included a left pleural mass. There were difficulties in getting diagnostic biopsy; eventually, biopsy of the mediastinal mass (5/20/21) revealed a poorly characterized malignant spindle cell neoplasm with high PD-L1 expression (90%), Ki-67 70%. Given the tumor location and morphology, this was felt to be most  compatible with malignant sarcomatoid mesothelioma. Baseline imaging (6/21/21) revealed progression of lung mets and left-sided subdiaphragmatic mass, stable liver lesions. He was seen by ENT for hoarseness, which was attributed to left true vocal fold motion impairment from mediastinal mass. Given high PD-L1 expression, he was started on Keytruda (C1=6/21/21). Interval imaging (CT 8/2/21) revealed positive response to treatment. He received 6 cycles total, most recently C6=10/4/21. Unfortunately, restaging imaging (10/18/21) revealed interval increase in size of the LUQ/splenic mass; however, the mediastinal and left pleural mass were stable to slightly decreased in size. He met with Dr. Wolff, who recommended a change in therapy to Doxil + ifosfamide. He was admitted on 10/26/21 to receive Cycle #1 of Doxil/ifos.  - PICC placed on admission; will remove on discharge.                              Treatment Plan: Doxil + ifosfamide (C1D1=10/26/21). Today will be Day 5.   - Doxil 45 mg/m2 (80mg) IV once - D1   - Ifosfamide/Mesna 1500 mg/m2 (2790 mg) CIVI - D1-6   - Mesna 1500 mg/m2 (2790 mg) IV over 18 hrs - starting D7   - Neulasta injection - D8, requested     # Cancer-related pain  - Continue PTA Celebrex 200 mg BID   - Continue PTA topical Bengay PRN at bedtime to left back     # Anemia, mild.  Likely related to chemo, hydration, blood draw.   - monitor  - would plan to transfuse if Hgb <7     GI  # Nausea  # Abdominal gas discomfort  Intermittent nausea throughout course thus far. Also with some abdominal discomfort due to gas (noted on 10/29 AM and improved throughout that same day). He is having bowel movements, last on 10/28 was soft but no watery diarrhea.   - scheduled Zofran with additional PRN antiemetics available  - simethicone PRN      # GERD  - Continue PTA omeprazole  - TUMS and Pepto Bismol available PRN     CARDS   # CAD   Follows with Dr. Gigi Vernon at [facility]. He was noted  to have coronary calcium on chest CT. CTA 9/29/21 with moderate mid LAD stenosis. Small to moderate caliber ramus branch with severe diffuse disease.  Echo completed 9/29/21 with normal EF and no valvular dysfunction. Repeat ECHO on 10/27 with EF 60-65%, early diastolic dysfunction but otherwise no change from previous.  - Continue PTA rosuvastatin 40 mg daily per patient preference; monitor transaminases closely in the setting of concomitant chemotherapy administration. WNL at this time.  - Encourage outpatient cardiology follow-up as recommended     # Hypertension  BPs ranging from normotensive to hypertensive (with SBP 140s-160) this admission. He states that he runs higher at baseline (though this is based on clinic assessments as he does not typically monitor his BP at home). He is not on any antihypertensive medications PTA. Unclear if this is related to CIVI fluids, though he is euvolemic on exam and weight is actually downtrended at this time. He is on hydrocortisone as below, no other treatment plan steroids.   - monitor   - PRN hydralazine (>170 or >100)    ENDO  # Panhypopituitarism  # Macroadenoma of the pituitary gland  Diagnosed in 2010. Found to have a pituitary macroadenoma, 1.9 cm in largest dimension. Underwent hypophysectomy on 8/23/10. Subsequent presumptive pituitary deficiency, on empiric meds for adrenal insufficiency and thyroid. Previously followed by Dr. Bill Mccall; plans to establish care with Dr. Jamir Grant on 11/11/21.  - Continue levothyroxine  - Continue hydrocortisone 15 mg qAM and 10 mg qPM  - Continue endocrinology follow-up as scheduled     ENT  # Vocal cord dysfunction  Manifested as hoarseness. Seen by ENT (Dr. Kyree Bearden) on 6/21/21 and noted to have left true vocal fold motion impairment from mediastinal mass. Underwent injection of ProLaryn (filler/bulking agent) on 7/1/21, which reportedly has been minimally beneficial. Seen in follow-up by Dr. Bearden on 9/1/21  and noted to have minimal improvement; he was then referred to the [facility] ([facility]).   - No acute inpatient management issues  - Continue outpatient ENT follow-up as previously scheduled (next scheduled for 12/9/21)     DERM  # Rosacea  - Continue PTA metronidazole gel     FEN:  -IVF per chemotherapy protocol. Pt is receiving IV fluids at ~44 ml/hr with continuous chemotherapy. Monitor I/Os and weights. No e/o fluid overload on exam thus far.   -Lyte replacement per protocol  -Regular diet as tolerated     Prophy/Misc:  -GI: PTA omeprazole  -DVT: enoxaparin 40 mg daily  -Bowels: Senna/Miralax PRN     Disposition: Inpatient admission to Heme/Onc Team 3 for planned chemotherapy. Anticipate discharge to home pending completion of the chemotherapy regimen, likely 11/2/21.    Follow-up:   - have sent another message (on 10/29) to follow-up on assist from outpatient team with getting pt Neulasta at a local clinic, still awaiting response  - labs at local [facility] for labs 11/11 and 11/18  - 11/11 outpt Endocrine appt  - 11/22 [facility] labs and CT CAP  - 11/23 virtual [facility] visit    Discussed with Dr. De La O.     Nathalia Peterson PA-C  Hematology/Oncology  Pager: [phone]    Interval History   No acute overnight events. Ifrah is resting in bed this AM. Had a headache last night but this was relieved with Tylenol. He continues with nausea but abdominal gas discomfort did improve throughout yesterday. Using TUMs with some relief. Still having BMs. Denies SOB or difficulty breathing, not feeling fluid overloaded.       Physical Exam   Vital Signs with Ranges  Temp:  [97.4  F (36.3  C)-99.2  F (37.3  C)] 99.2  F (37.3  C)  Pulse:  [74-95] 91  Resp:  [16-20] 20  BP: (147-166)/(85-96) 154/94  SpO2:  [97 %-100 %] 97 %    I/O last 3 completed shifts:  In: 466.4 [P.O.:60; I.V.:20; IV Piggyback:386.4]  Out: 4100 [Urine:4100]    Vitals:    10/28/21 0803 10/29/21 0829 10/30/21 0744   Weight: 71.3 kg (157 lb 1.6 oz)  71.8 kg (158 lb 6.4 oz) 69.4 kg (152 lb 14.4 oz)     Constitutional: Pleasant and cooperative male, seen resting in bed at this time. Appears more tired than prior visits. NAD.  HEENT: NC/AT, MMM.  Respiratory: No increased work of breathing, on RA. Lungs CTAB, no crackles or wheezing.  Cardiovascular: RRR, no murmur noted. No peripheral edema.  GI: Normal bowel sounds, soft, non-distended and non-tender.  MSK: No gross deformities.  Skin: Clean, dry. No rashes or lesions on limited exam.  Neurologic: A&O. Answers questions appropriately. No tremor.  Psych: Calm, appropriate affect  VAD: PICC in RUE, c/d/i.      Medications         celecoxib  200 mg Oral BID     Chemotherapy Infusing-Continuous Infusion   Does not apply Q8H     [START ON 11/2/2021] dextrose 5% water  10-20 mL Intracatheter Daily at 8 pm     [START ON 11/2/2021] dextrose 5% water  10-20 mL Intracatheter Daily at 8 pm     enoxaparin ANTICOAGULANT  40 mg Subcutaneous Q24H     [START ON 11/2/2021] filgrastim (NEUPOGEN/GRANIX) intravenous  5 mcg/kg (Treatment Plan Recorded) Intravenous Daily at 8 pm     heparin lock flush  5-20 mL Intracatheter Q24H     heparin lock flush  5-20 mL Intracatheter Q24H     hydrocortisone  10 mg Oral Daily     hydrocortisone  15 mg Oral Daily     ifosfamide (IFEX) infusion  1,500 mg/m2 (Treatment Plan Recorded) Intravenous Q24H     levothyroxine  75 mcg Oral QAM     [START ON 11/1/2021] mesna (MESNEX) infusion  1,500 mg/m2 (Treatment Plan Recorded) Intravenous Once     metroNIDAZOLE   Topical BID     ondansetron  8 mg Oral Q8H     pantoprazole  40 mg Oral QAM AC     rosuvastatin  40 mg Oral Daily     sodium chloride (PF)  10-40 mL Intracatheter Q8H     triamcinolone   Topical BID     Vitamin D3  1,000 Units Oral Daily       Antiinfectives  Anti-infectives (From now, onward)    None          Data   CBC   Recent Labs   Lab 10/30/21  0618 10/29/21  0627 10/28/21  0543 10/27/21  0605   WBC 8.7 9.8 10.4 10.5   RBC 4.62 4.58 4.51  4.62   HGB 12.8* 12.6* 12.3* 12.7*   HCT 40.0 40.1 38.4* 40.0   MCV 87 88 85 87   MCH 27.7 27.5 27.3 27.5   MCHC 32.0 31.4* 32.0 31.8   RDW 13.7 13.8 13.6 13.4    225 209 241       CMP   Recent Labs   Lab 10/30/21  0618 10/29/21  0627 10/28/21  0824 10/28/21  0543 10/27/21  0605 10/27/21  0605    140  --  140  --  140   POTASSIUM 3.7 3.9  --  3.8  --  3.8   CHLORIDE 108 108  --  111*  --  109   CO2 25 28  --  28  --  28   ANIONGAP 6 4  --  1*  --  3   GLC 84 86  --  97  --  92   BUN 14 15  --  16  --  16   CR 0.82 0.78  --  0.81  --  0.77   GFRESTIMATED 88 90  --  88  --  90   COTY 9.3 9.0  --  8.7  --  8.7   MAG 2.4* 2.3  --  2.3  --  2.3   PHOS 2.7 2.3* 2.4* 2.4*   < > 2.6   PROTTOTAL 6.5* 6.3*  --  6.3*  --  6.3*   ALBUMIN 2.8* 2.7*  --  2.7*  --  2.8*   BILITOTAL 0.5 0.4  --  0.7  --  0.4   ALKPHOS 149 148  --  139  --  140   AST 18 24  --  34  --  40   ALT 31 34  --  44  --  44    < > = values in this interval not displayed.       LFTs   Recent Labs   Lab 10/30/21  0618   PROTTOTAL 6.5*   ALBUMIN 2.8*   BILITOTAL 0.5   ALKPHOS 149   AST 18   ALT 31

## 2021-10-30 NOTE — PLAN OF CARE
VSS on RA, hypertensive but within parameters. Alert and oriented x 4, R facial droop noted per baseline. On regular diet with nausea managed with PRN Compazine and scheduled Zofran. Denies chest pain and SOB. C/o of mild headache, PRN tylenol given. R DL PICC, good blood return, infusing Ifosfamide/Mesna. Voiding spontaneously with good output. Up independent. Continue with plan of care.

## 2021-10-30 NOTE — PLAN OF CARE
[id number]: Hypertensive to 166/95, recheck 155/88. Asymptomatic. OVSS. Received tylenol x1 for HA with relief. Mild nausea managed with scheduled zofran. Received tums for abdominal discomfort. Day #4 CIVI ifos/mesna infusing via PICC; good blood return noted q4h. Good UOP. Slept between cares. Continue with POC.

## 2021-10-31 LAB
ALBUMIN SERPL-MCNC: 3.1 G/DL (ref 3.4–5)
ALP SERPL-CCNC: 161 U/L (ref 40–150)
ALT SERPL W P-5'-P-CCNC: 33 U/L (ref 0–70)
ANION GAP SERPL CALCULATED.3IONS-SCNC: 3 MMOL/L (ref 3–14)
AST SERPL W P-5'-P-CCNC: 32 U/L (ref 0–45)
BASOPHILS # BLD AUTO: 0 10E3/UL (ref 0–0.2)
BASOPHILS NFR BLD AUTO: 1 %
BILIRUB SERPL-MCNC: 0.6 MG/DL (ref 0.2–1.3)
BUN SERPL-MCNC: 16 MG/DL (ref 7–30)
CALCIUM SERPL-MCNC: 9.4 MG/DL (ref 8.5–10.1)
CHLORIDE BLD-SCNC: 108 MMOL/L (ref 94–109)
CO2 SERPL-SCNC: 26 MMOL/L (ref 20–32)
CREAT SERPL-MCNC: 0.85 MG/DL (ref 0.66–1.25)
EOSINOPHIL # BLD AUTO: 0.3 10E3/UL (ref 0–0.7)
EOSINOPHIL NFR BLD AUTO: 4 %
ERYTHROCYTE [DISTWIDTH] IN BLOOD BY AUTOMATED COUNT: 13.7 % (ref 10–15)
GFR SERPL CREATININE-BSD FRML MDRD: 86 ML/MIN/1.73M2
GLUCOSE BLD-MCNC: 79 MG/DL (ref 70–99)
HCT VFR BLD AUTO: 43.7 % (ref 40–53)
HGB BLD-MCNC: 13.7 G/DL (ref 13.3–17.7)
IMM GRANULOCYTES # BLD: 0 10E3/UL
IMM GRANULOCYTES NFR BLD: 1 %
LYMPHOCYTES # BLD AUTO: 1.8 10E3/UL (ref 0.8–5.3)
LYMPHOCYTES NFR BLD AUTO: 21 %
MAGNESIUM SERPL-MCNC: 2.3 MG/DL (ref 1.6–2.3)
MCH RBC QN AUTO: 27.4 PG (ref 26.5–33)
MCHC RBC AUTO-ENTMCNC: 31.4 G/DL (ref 31.5–36.5)
MCV RBC AUTO: 87 FL (ref 78–100)
MONOCYTES # BLD AUTO: 0.7 10E3/UL (ref 0–1.3)
MONOCYTES NFR BLD AUTO: 9 %
NEUTROPHILS # BLD AUTO: 5.4 10E3/UL (ref 1.6–8.3)
NEUTROPHILS NFR BLD AUTO: 64 %
NRBC # BLD AUTO: 0 10E3/UL
NRBC BLD AUTO-RTO: 0 /100
PHOSPHATE SERPL-MCNC: 2 MG/DL (ref 2.5–4.5)
PLATELET # BLD AUTO: 210 10E3/UL (ref 150–450)
POTASSIUM BLD-SCNC: 3.8 MMOL/L (ref 3.4–5.3)
PROT SERPL-MCNC: 7.2 G/DL (ref 6.8–8.8)
RBC # BLD AUTO: 5 10E6/UL (ref 4.4–5.9)
SODIUM SERPL-SCNC: 137 MMOL/L (ref 133–144)
WBC # BLD AUTO: 8.3 10E3/UL (ref 4–11)

## 2021-10-31 PROCEDURE — 120N000002 HC R&B MED SURG/OB UMMC

## 2021-10-31 PROCEDURE — 258N000003 HC RX IP 258 OP 636: Performed by: INTERNAL MEDICINE

## 2021-10-31 PROCEDURE — 250N000013 HC RX MED GY IP 250 OP 250 PS 637: Performed by: PHYSICIAN ASSISTANT

## 2021-10-31 PROCEDURE — 36592 COLLECT BLOOD FROM PICC: CPT | Performed by: PHYSICIAN ASSISTANT

## 2021-10-31 PROCEDURE — 84100 ASSAY OF PHOSPHORUS: CPT | Performed by: PHYSICIAN ASSISTANT

## 2021-10-31 PROCEDURE — 99232 SBSQ HOSP IP/OBS MODERATE 35: CPT | Performed by: STUDENT IN AN ORGANIZED HEALTH CARE EDUCATION/TRAINING PROGRAM

## 2021-10-31 PROCEDURE — 258N000003 HC RX IP 258 OP 636: Performed by: PHYSICIAN ASSISTANT

## 2021-10-31 PROCEDURE — 250N000011 HC RX IP 250 OP 636: Performed by: PHYSICIAN ASSISTANT

## 2021-10-31 PROCEDURE — 85025 COMPLETE CBC W/AUTO DIFF WBC: CPT | Performed by: PHYSICIAN ASSISTANT

## 2021-10-31 PROCEDURE — 250N000011 HC RX IP 250 OP 636: Performed by: INTERNAL MEDICINE

## 2021-10-31 PROCEDURE — 83735 ASSAY OF MAGNESIUM: CPT | Performed by: PHYSICIAN ASSISTANT

## 2021-10-31 PROCEDURE — 250N000009 HC RX 250: Performed by: INTERNAL MEDICINE

## 2021-10-31 PROCEDURE — 80053 COMPREHEN METABOLIC PANEL: CPT | Performed by: PHYSICIAN ASSISTANT

## 2021-10-31 RX ORDER — LORAZEPAM 2 MG/ML
0.5 INJECTION INTRAMUSCULAR ONCE
Status: DISCONTINUED | OUTPATIENT
Start: 2021-10-31 | End: 2021-11-02 | Stop reason: HOSPADM

## 2021-10-31 RX ADMIN — ONDANSETRON HYDROCHLORIDE 8 MG: 8 TABLET, FILM COATED ORAL at 03:58

## 2021-10-31 RX ADMIN — ENOXAPARIN SODIUM 40 MG: 40 INJECTION SUBCUTANEOUS at 22:11

## 2021-10-31 RX ADMIN — HYDROCORTISONE 10 MG: 10 TABLET ORAL at 15:45

## 2021-10-31 RX ADMIN — METRONIDAZOLE: 7.5 CREAM TOPICAL at 11:50

## 2021-10-31 RX ADMIN — LEVOTHYROXINE SODIUM 75 MCG: 0.05 TABLET ORAL at 06:34

## 2021-10-31 RX ADMIN — CELECOXIB 200 MG: 200 CAPSULE ORAL at 19:24

## 2021-10-31 RX ADMIN — TRIAMCINOLONE ACETONIDE: 1 CREAM TOPICAL at 11:50

## 2021-10-31 RX ADMIN — ONDANSETRON HYDROCHLORIDE 8 MG: 8 TABLET, FILM COATED ORAL at 11:43

## 2021-10-31 RX ADMIN — TRIAMCINOLONE ACETONIDE: 1 CREAM TOPICAL at 22:11

## 2021-10-31 RX ADMIN — Medication 5 ML: at 15:47

## 2021-10-31 RX ADMIN — ROSUVASTATIN CALCIUM 40 MG: 40 TABLET, COATED ORAL at 22:11

## 2021-10-31 RX ADMIN — MESNA 2790 MG: 100 INJECTION, SOLUTION INTRAVENOUS at 20:22

## 2021-10-31 RX ADMIN — PROCHLORPERAZINE EDISYLATE 5 MG: 5 INJECTION INTRAMUSCULAR; INTRAVENOUS at 07:58

## 2021-10-31 RX ADMIN — PANTOPRAZOLE SODIUM 40 MG: 40 TABLET, DELAYED RELEASE ORAL at 08:04

## 2021-10-31 RX ADMIN — Medication: at 00:06

## 2021-10-31 RX ADMIN — CELECOXIB 200 MG: 200 CAPSULE ORAL at 08:04

## 2021-10-31 RX ADMIN — POTASSIUM PHOSPHATE, MONOBASIC POTASSIUM PHOSPHATE, DIBASIC 9 MMOL: 224; 236 INJECTION, SOLUTION, CONCENTRATE INTRAVENOUS at 09:24

## 2021-10-31 RX ADMIN — FOSAPREPITANT 150 MG: 150 INJECTION, POWDER, LYOPHILIZED, FOR SOLUTION INTRAVENOUS at 08:36

## 2021-10-31 RX ADMIN — METRONIDAZOLE: 7.5 CREAM TOPICAL at 22:11

## 2021-10-31 RX ADMIN — Medication 1000 UNITS: at 08:04

## 2021-10-31 RX ADMIN — ONDANSETRON HYDROCHLORIDE 8 MG: 8 TABLET, FILM COATED ORAL at 19:24

## 2021-10-31 RX ADMIN — HYDROCORTISONE 15 MG: 5 TABLET ORAL at 08:04

## 2021-10-31 RX ADMIN — Medication 5 ML: at 06:19

## 2021-10-31 ASSESSMENT — ACTIVITIES OF DAILY LIVING (ADL)
ADLS_ACUITY_SCORE: 4

## 2021-10-31 ASSESSMENT — MIFFLIN-ST. JEOR: SCORE: 1389.92

## 2021-10-31 NOTE — PROGRESS NOTES
[facility]    Progress Note  Hematology / Oncology     Date of Admission:  10/26/2021  Hospital Day #: 5   Date of Service (when I saw the patient): 10/31/2021    Assessment & Plan   Ifrah Huitron is a 71 year old male with PMH rosacea, pituitary macroadenoma s/p resection, CAD, GERD, kidney stones, DJD, and metastatic spindle cell sarcoma who was admitted to begin treatment with Cycle #1 of Doxil + ifosfamide.    Today  - will be Day 6  - worsened/persistent nausea despite scheduled Zofran (per protocol) and compazine PRN given TID yesterday. Give dose of Emend today.   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 Wolff. Patient presented to his PCP in 03/2021 with chest/left shoulder and back pain that led to imaging which revealed multiple lung mets, included a left pleural mass. There were difficulties in getting diagnostic biopsy; eventually, biopsy of the mediastinal mass (5/20/21) revealed a poorly characterized malignant spindle cell neoplasm with high PD-L1 expression (90%), Ki-67 70%. 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 Baseline imaging (6/21/21) revealed progression of lung mets and left-sided subdiaphragmatic mass, stable liver lesions. He was seen by ENT for hoarseness, which was attributed to left true vocal fold motion impairment from mediastinal mass. Given high PD-L1 expression, he was started on Keytruda (C1=6/21/21). Interval imaging (CT 8/2/21) revealed positive response to treatment. He received 6 cycles total, most recently C6=10/4/21. Unfortunately, restaging imaging (10/18/21) revealed interval increase in size of the  LUQ/splenic mass; however, the mediastinal and left pleural mass were stable to slightly decreased in size. He met with Dr. Wolff, who recommended a change in therapy to Doxil + ifosfamide. He was admitted on 10/26/21 to receive Cycle #1 of Doxil/ifos.  - PICC placed on admission; will remove on discharge.                              Treatment Plan: Doxil + ifosfamide (C1D1=10/26/21). Today will be Day 6.   - Doxil 45 mg/m2 (80mg) IV once - D1   - Ifosfamide/Mesna 1500 mg/m2 (2790 mg) CIVI - D1-6   - Mesna 1500 mg/m2 (2790 mg) IV over 18 hrs - starting D7   - Neulasta injection - D8, requested     # Cancer-related pain  - Continue PTA Celebrex 200 mg BID   - Continue PTA topical Bengay PRN at bedtime to left back     # Anemia, mild, intermittent  Likely related to chemo, hydration, blood draw.   - monitor  - would plan to transfuse if Hgb <7     GI  # Nausea 2/2 chemotherapy   # Abdominal gas discomfort  Intermittent nausea throughout course thus far, more persistent 10/30-10/31. Also with some abdominal discomfort due to gas (noted on 10/29 AM and improved throughout that same day). He is having bowel movements.   - scheduled Zofran  - PRN compazine available. He used this TID on 10/30.   - give Emend 150mg IV x once today   - could consider dose of Ativan if needed (not standing on protocol)  - simethicone PRN      # GERD  - Continue PTA omeprazole  - TUMS and Pepto Bismol available PRN     CARDS   # CAD   Follows with Dr. Gigi Vernon at [facility]. He was noted to have coronary calcium on chest CT. CTA 9/29/21 with moderate mid LAD stenosis. Small to moderate caliber ramus branch with severe diffuse disease.  Echo completed 9/29/21 with normal EF and no valvular dysfunction. Repeat ECHO on 10/27 with EF 60-65%, early diastolic dysfunction but otherwise no change from previous.  - Continue PTA rosuvastatin 40 mg daily per patient preference; monitor transaminases closely in the setting of  concomitant chemotherapy administration. WNL at this time.  - Encourage outpatient cardiology follow-up as recommended     # Hypertension  BPs ranging from normotensive to hypertensive (with SBP 140s-160) this admission. He states that he runs higher at baseline (though this is based on clinic assessments as he does not typically monitor his BP at home). Appears recent intermittent clinic BPs sit at 140s-150s/80s-90s when seen in person, otherwise many of the visits are currently virtual. He is not on any antihypertensive medications PTA. Unclear if this is related to CIVI fluids, though he is euvolemic on exam and weight is actually downtrended at this time. He is on hydrocortisone as below, no other treatment plan steroids.   - monitor   - PRN hydralazine (SBP >170 or >100)  - BPs slightly improved on 10/31 so continue to monitor without adding scheduled antihypertensive at this time. Could benefit from PCP follow-up for this issue.     ENDO  # Panhypopituitarism  # Macroadenoma of the pituitary gland  Diagnosed in 2010. Found to have a pituitary macroadenoma, 1.9 cm in largest dimension. Underwent hypophysectomy on 8/23/10. Subsequent presumptive pituitary deficiency, on empiric meds for adrenal insufficiency and thyroid. Previously followed by Dr. Bill Mccall; plans to establish care with Dr. Jamir Grant on 11/11/21.  - Continue levothyroxine  - Continue hydrocortisone 15 mg qAM and 10 mg qPM  - Continue endocrinology follow-up as scheduled     ENT  # Vocal cord dysfunction  Manifested as hoarseness. Seen by ENT (Dr. Kyree Bearden) on 6/21/21 and noted to have left true vocal fold motion impairment from mediastinal mass. Underwent injection of ProLaryn (filler/bulking agent) on 7/1/21, which reportedly has been minimally beneficial. Seen in follow-up by Dr. Bearden on 9/1/21 and noted to have minimal improvement; he was then referred to the [facility] ([facility]).   - No acute inpatient management  issues  - Continue outpatient ENT follow-up as previously scheduled (next scheduled for 12/9/21)     DERM  # Rosacea  - Continue PTA metronidazole gel     FEN:  -IVF per chemotherapy protocol. Pt is receiving IV fluids at ~44 ml/hr with continuous chemotherapy. Monitor I/Os and weights. No e/o fluid overload on exam thus far.   -Lyte replacement per protocol  -Regular diet as tolerated     Prophy/Misc:  -GI: PTA omeprazole  -DVT: enoxaparin 40 mg daily  -Bowels: Senna/Miralax PRN     Disposition: Inpatient admission to Heme/Onc Team 3 for planned chemotherapy. Anticipate discharge to home pending completion of the chemotherapy regimen, likely 11/2/21.    Follow-up:   - sent another message (on 10/29) to follow-up on assist from outpatient team with getting pt Neulasta at a local clinic, still awaiting response  - labs at local [facility] for labs 11/11 and 11/18  - 11/11 outpt Endocrine appt  - 11/22 [facility] labs and CT CAP  - 11/23 virtual [facility] visit    Discussed with Dr. De La O.     Nathalia Peterson PA-C  Hematology/Oncology  Pager: [phone]    Interval History   No acute overnight events apart from more persistent nausea without vomiting. Ifrah is resting in bed this AM. Thinks that the new medicine (Emend) may be helping. He denies any further headache today. Thinks his reflux/abdominal discomfort is currently well controlled. RN note did document mild epistaxis earlier this AM, resolved spontaneously. Plts ok. Provided Cunard spray NS for support. Denies SOB or difficulty breathing, not feeling fluid overloaded.       Physical Exam   Vital Signs with Ranges  Temp:  [97.5  F (36.4  C)-99.6  F (37.6  C)] 98.7  F (37.1  C)  Pulse:  [86-99] 98  Resp:  [16-20] 20  BP: (116-166)/(77-95) 116/77  SpO2:  [97 %-99 %] 98 %    I/O last 3 completed shifts:  In: 396.4 [I.V.:10; IV Piggyback:386.4]  Out: 3250 [Urine:3250]    Vitals:    10/29/21 0829 10/30/21 0744 10/31/21 0900   Weight: 71.8 kg (158 lb 6.4 oz) 69.4 kg  (152 lb 14.4 oz) 68.6 kg (151 lb 4.8 oz)     Constitutional: Pleasant and cooperative male, seen resting in bed at this time. Appears more tired and to be feeling unwell again today. NAD.  HEENT: NC/AT, MMM.  Respiratory: No increased work of breathing, on RA. Lungs CTAB, no crackles or wheezing.  Cardiovascular: RRR, no murmur noted. No peripheral edema.  GI: Normal bowel sounds, soft, non-distended and non-tender.  MSK: No gross deformities.  Skin: Clean, dry. No rashes or lesions on limited exam.  Neurologic: A&O. Answers questions appropriately. No tremor.  Psych: Calm, appropriate affect  VAD: PICC in RUE, c/d/i.      Medications         celecoxib  200 mg Oral BID     Chemotherapy Infusing-Continuous Infusion   Does not apply Q8H     [START ON 11/2/2021] dextrose 5% water  10-20 mL Intracatheter Daily at 8 pm     [START ON 11/2/2021] dextrose 5% water  10-20 mL Intracatheter Daily at 8 pm     enoxaparin ANTICOAGULANT  40 mg Subcutaneous Q24H     [START ON 11/2/2021] filgrastim (NEUPOGEN/GRANIX) intravenous  5 mcg/kg (Treatment Plan Recorded) Intravenous Daily at 8 pm     heparin lock flush  5-20 mL Intracatheter Q24H     heparin lock flush  5-20 mL Intracatheter Q24H     hydrocortisone  10 mg Oral Daily     hydrocortisone  15 mg Oral Daily     ifosfamide (IFEX) infusion  1,500 mg/m2 (Treatment Plan Recorded) Intravenous Q24H     levothyroxine  75 mcg Oral QAM     LORazepam  0.5 mg Intravenous Once     [START ON 11/1/2021] mesna (MESNEX) infusion  1,500 mg/m2 (Treatment Plan Recorded) Intravenous Once     metroNIDAZOLE   Topical BID     ondansetron  8 mg Oral Q8H     pantoprazole  40 mg Oral QAM AC     sodium phosphate  9 mmol Intravenous Once     rosuvastatin  40 mg Oral Daily     sodium chloride  1-2 spray Both Nostrils TID     sodium chloride (PF)  10-40 mL Intracatheter Q8H     triamcinolone   Topical BID     Vitamin D3  1,000 Units Oral Daily       Antiinfectives  Anti-infectives (From now, onward)    None           Data   CBC   Recent Labs   Lab 10/31/21  0623 10/30/21  0618 10/29/21  0627 10/28/21  0543   WBC 8.3 8.7 9.8 10.4   RBC 5.00 4.62 4.58 4.51   HGB 13.7 12.8* 12.6* 12.3*   HCT 43.7 40.0 40.1 38.4*   MCV 87 87 88 85   MCH 27.4 27.7 27.5 27.3   MCHC 31.4* 32.0 31.4* 32.0   RDW 13.7 13.7 13.8 13.6    211 225 209       CMP   Recent Labs   Lab 10/31/21  0623 10/30/21  0618 10/29/21  0627 10/28/21  0824 10/28/21  0543 10/28/21  0543    139 140  --   --  140   POTASSIUM 3.8 3.7 3.9  --   --  3.8   CHLORIDE 108 108 108  --   --  111*   CO2 26 25 28  --   --  28   ANIONGAP 3 6 4  --   --  1*   GLC 79 84 86  --   --  97   BUN 16 14 15  --   --  16   CR 0.85 0.82 0.78  --   --  0.81   GFRESTIMATED 86 88 90  --   --  88   COTY 9.4 9.3 9.0  --   --  8.7   MAG 2.3 2.4* 2.3  --   --  2.3   PHOS 2.0* 2.7 2.3* 2.4*   < > 2.4*   PROTTOTAL 7.2 6.5* 6.3*  --   --  6.3*   ALBUMIN 3.1* 2.8* 2.7*  --   --  2.7*   BILITOTAL 0.6 0.5 0.4  --   --  0.7   ALKPHOS 161* 149 148  --   --  139   AST 32 18 24  --   --  34   ALT 33 31 34  --   --  44    < > = values in this interval not displayed.       LFTs   Recent Labs   Lab 10/31/21  0623   PROTTOTAL 7.2   ALBUMIN 3.1*   BILITOTAL 0.6   ALKPHOS 161*   AST 32   ALT 33

## 2021-10-31 NOTE — PROVIDER NOTIFICATION
"Heme onc gi paged at #[phone]: \"pt is having nausea uncontrolled with scheduled zofran and PRN compazine. Could you order an additional PRN for nausea please?\"  "

## 2021-10-31 NOTE — PLAN OF CARE
[id number]: AVSS. BPs improved; most recent was 134/78. Pain managed with Bengay cream. C/o uncontrolled nausea; MD ordered one-time dose of 0.5 mg ativan but pt declined and elected to try to sleep it off. Day #5 CIVI ifos/mesna infusing at 48.3 mL/hr; good blood return noted q4h. Had a mild nosebleed this AM lasting 1-2 minutes, resolved on its own. Good UOP. Continue with POC.

## 2021-10-31 NOTE — PLAN OF CARE
1500 - 2330: A&O, AVSS ex BP elevated in 160's - 150's/90's within the parameter. PRN hydralazine available if SBP> 170  CIVI D#5 infusing via picc, tolerating fine ex nausea and abdominal discomfort. Gave prn tums and pepto bismol w/ not much change.  Pt c/o mild headache and back pain, gave prn tylenol and gave scheduled celecoxib for back pain.   Up ad kaitlynn, Good UOP, had a bm today.  Continue with poc..

## 2021-10-31 NOTE — PROGRESS NOTES
Nursing Focus: Chemotherapy  D: Positive brisk bright red blood return via PICC. Insertion site is clean/dry/intact, dressing intact with no complaints of pain.  Urine output is recorded in intake Doc Flowsheet.    I: On scheduled zofran given per order (see electronic medical administration record). Dose #5 of Ifosfamide/Mesna started to infuse over 24 hours. Reviewed pt teaching on chemotherapy side effects.  Pt denies need for further teaching. Chemotherapy double checked per protocol by two chemotherapy competent RN's.   A: Tolerating chemo well. Denies nausea.   P: Continue to monitor urine output and symptoms of nausea. Screen for symptoms of toxicity.

## 2021-10-31 NOTE — PLAN OF CARE
Ifrah has C1 D5 of Ifos/Mesna running CIVI via PICC with + blood return.  Continues with nausea with ATC Zofran and PRN Compazine IV.  Reported some relief of nausea after Emend but did not attempt to eat anything.   Said the reflux in better today.  Pt sleepy today but is easy to wake up just by saying his name.  Is A&O x4  and steady on feet - need to monitor for IFX toxicity.  Nose bleed x1 this am after blowing.  Ocean spray every 4 hours. Phos 2.0 and replaced IV with recheck in am.

## 2021-11-01 LAB
ALBUMIN SERPL-MCNC: 2.7 G/DL (ref 3.4–5)
ALP SERPL-CCNC: 154 U/L (ref 40–150)
ALT SERPL W P-5'-P-CCNC: 39 U/L (ref 0–70)
ANION GAP SERPL CALCULATED.3IONS-SCNC: 4 MMOL/L (ref 3–14)
AST SERPL W P-5'-P-CCNC: 39 U/L (ref 0–45)
BASOPHILS # BLD MANUAL: 0 10E3/UL (ref 0–0.2)
BASOPHILS NFR BLD MANUAL: 0 %
BILIRUB SERPL-MCNC: 0.5 MG/DL (ref 0.2–1.3)
BUN SERPL-MCNC: 18 MG/DL (ref 7–30)
CALCIUM SERPL-MCNC: 8.8 MG/DL (ref 8.5–10.1)
CHLORIDE BLD-SCNC: 109 MMOL/L (ref 94–109)
CO2 SERPL-SCNC: 25 MMOL/L (ref 20–32)
CREAT SERPL-MCNC: 0.84 MG/DL (ref 0.66–1.25)
EOSINOPHIL # BLD MANUAL: 0.1 10E3/UL (ref 0–0.7)
EOSINOPHIL NFR BLD MANUAL: 1 %
ERYTHROCYTE [DISTWIDTH] IN BLOOD BY AUTOMATED COUNT: 13.6 % (ref 10–15)
GFR SERPL CREATININE-BSD FRML MDRD: 87 ML/MIN/1.73M2
GLUCOSE BLD-MCNC: 85 MG/DL (ref 70–99)
HCT VFR BLD AUTO: 39.7 % (ref 40–53)
HGB BLD-MCNC: 12.8 G/DL (ref 13.3–17.7)
LYMPHOCYTES # BLD MANUAL: 1.9 10E3/UL (ref 0.8–5.3)
LYMPHOCYTES NFR BLD MANUAL: 25 %
MAGNESIUM SERPL-MCNC: 2.5 MG/DL (ref 1.6–2.3)
MCH RBC QN AUTO: 27.9 PG (ref 26.5–33)
MCHC RBC AUTO-ENTMCNC: 32.2 G/DL (ref 31.5–36.5)
MCV RBC AUTO: 87 FL (ref 78–100)
MONOCYTES # BLD MANUAL: 0.4 10E3/UL (ref 0–1.3)
MONOCYTES NFR BLD MANUAL: 6 %
NEUTROPHILS # BLD MANUAL: 5 10E3/UL (ref 1.6–8.3)
NEUTROPHILS NFR BLD MANUAL: 68 %
PHOSPHATE SERPL-MCNC: 1.9 MG/DL (ref 2.5–4.5)
PHOSPHATE SERPL-MCNC: 2.4 MG/DL (ref 2.5–4.5)
PLAT MORPH BLD: NORMAL
PLATELET # BLD AUTO: 193 10E3/UL (ref 150–450)
POTASSIUM BLD-SCNC: 3.6 MMOL/L (ref 3.4–5.3)
PROT SERPL-MCNC: 6.6 G/DL (ref 6.8–8.8)
RBC # BLD AUTO: 4.59 10E6/UL (ref 4.4–5.9)
RBC MORPH BLD: NORMAL
SODIUM SERPL-SCNC: 138 MMOL/L (ref 133–144)
WBC # BLD AUTO: 7.4 10E3/UL (ref 4–11)

## 2021-11-01 PROCEDURE — 36592 COLLECT BLOOD FROM PICC: CPT | Performed by: INTERNAL MEDICINE

## 2021-11-01 PROCEDURE — 250N000011 HC RX IP 250 OP 636: Performed by: PHYSICIAN ASSISTANT

## 2021-11-01 PROCEDURE — 258N000003 HC RX IP 258 OP 636: Performed by: INTERNAL MEDICINE

## 2021-11-01 PROCEDURE — 84100 ASSAY OF PHOSPHORUS: CPT | Performed by: PHYSICIAN ASSISTANT

## 2021-11-01 PROCEDURE — 250N000009 HC RX 250: Performed by: INTERNAL MEDICINE

## 2021-11-01 PROCEDURE — 83735 ASSAY OF MAGNESIUM: CPT | Performed by: PHYSICIAN ASSISTANT

## 2021-11-01 PROCEDURE — 250N000013 HC RX MED GY IP 250 OP 250 PS 637: Performed by: PHYSICIAN ASSISTANT

## 2021-11-01 PROCEDURE — 85027 COMPLETE CBC AUTOMATED: CPT | Performed by: PHYSICIAN ASSISTANT

## 2021-11-01 PROCEDURE — 250N000011 HC RX IP 250 OP 636: Performed by: INTERNAL MEDICINE

## 2021-11-01 PROCEDURE — 99232 SBSQ HOSP IP/OBS MODERATE 35: CPT | Performed by: INTERNAL MEDICINE

## 2021-11-01 PROCEDURE — 84100 ASSAY OF PHOSPHORUS: CPT | Performed by: INTERNAL MEDICINE

## 2021-11-01 PROCEDURE — 80053 COMPREHEN METABOLIC PANEL: CPT | Performed by: PHYSICIAN ASSISTANT

## 2021-11-01 PROCEDURE — 36592 COLLECT BLOOD FROM PICC: CPT | Performed by: PHYSICIAN ASSISTANT

## 2021-11-01 PROCEDURE — 120N000002 HC R&B MED SURG/OB UMMC

## 2021-11-01 RX ORDER — LORAZEPAM 0.5 MG/1
0.5 TABLET ORAL EVERY 4 HOURS PRN
Status: DISCONTINUED | OUTPATIENT
Start: 2021-11-01 | End: 2021-11-02 | Stop reason: HOSPADM

## 2021-11-01 RX ADMIN — MESNA 2790 MG: 100 INJECTION, SOLUTION INTRAVENOUS at 19:50

## 2021-11-01 RX ADMIN — TRIAMCINOLONE ACETONIDE: 1 CREAM TOPICAL at 08:48

## 2021-11-01 RX ADMIN — HYDROCORTISONE 10 MG: 10 TABLET ORAL at 16:49

## 2021-11-01 RX ADMIN — Medication 5 ML: at 06:05

## 2021-11-01 RX ADMIN — METRONIDAZOLE: 7.5 CREAM TOPICAL at 21:10

## 2021-11-01 RX ADMIN — ROSUVASTATIN CALCIUM 40 MG: 40 TABLET, COATED ORAL at 21:09

## 2021-11-01 RX ADMIN — CALCIUM CARBONATE 1000 MG: 500 TABLET, CHEWABLE ORAL at 13:14

## 2021-11-01 RX ADMIN — POTASSIUM & SODIUM PHOSPHATES POWDER PACK 280-160-250 MG 1 PACKET: 280-160-250 PACK at 11:40

## 2021-11-01 RX ADMIN — Medication 1000 UNITS: at 09:43

## 2021-11-01 RX ADMIN — PANTOPRAZOLE SODIUM 40 MG: 40 TABLET, DELAYED RELEASE ORAL at 08:48

## 2021-11-01 RX ADMIN — CELECOXIB 200 MG: 200 CAPSULE ORAL at 09:43

## 2021-11-01 RX ADMIN — POTASSIUM PHOSPHATE, MONOBASIC POTASSIUM PHOSPHATE, DIBASIC 9 MMOL: 224; 236 INJECTION, SOLUTION, CONCENTRATE INTRAVENOUS at 22:50

## 2021-11-01 RX ADMIN — POTASSIUM PHOSPHATE, MONOBASIC AND POTASSIUM PHOSPHATE, DIBASIC 15 MMOL: 224; 236 INJECTION, SOLUTION INTRAVENOUS at 10:40

## 2021-11-01 RX ADMIN — CELECOXIB 200 MG: 200 CAPSULE ORAL at 19:47

## 2021-11-01 RX ADMIN — METRONIDAZOLE: 7.5 CREAM TOPICAL at 08:48

## 2021-11-01 RX ADMIN — Medication 5 ML: at 16:52

## 2021-11-01 RX ADMIN — TRIAMCINOLONE ACETONIDE: 1 CREAM TOPICAL at 21:09

## 2021-11-01 RX ADMIN — PROCHLORPERAZINE MALEATE 5 MG: 5 TABLET ORAL at 09:42

## 2021-11-01 RX ADMIN — LEVOTHYROXINE SODIUM 75 MCG: 0.05 TABLET ORAL at 06:46

## 2021-11-01 RX ADMIN — HYDROCORTISONE 15 MG: 5 TABLET ORAL at 09:43

## 2021-11-01 RX ADMIN — ENOXAPARIN SODIUM 40 MG: 40 INJECTION SUBCUTANEOUS at 21:09

## 2021-11-01 RX ADMIN — ONDANSETRON HYDROCHLORIDE 8 MG: 8 TABLET, FILM COATED ORAL at 11:36

## 2021-11-01 RX ADMIN — POTASSIUM & SODIUM PHOSPHATES POWDER PACK 280-160-250 MG 1 PACKET: 280-160-250 PACK at 19:47

## 2021-11-01 RX ADMIN — ONDANSETRON HYDROCHLORIDE 8 MG: 8 TABLET, FILM COATED ORAL at 04:21

## 2021-11-01 ASSESSMENT — ACTIVITIES OF DAILY LIVING (ADL)
ADLS_ACUITY_SCORE: 4

## 2021-11-01 ASSESSMENT — MIFFLIN-ST. JEOR: SCORE: 1387.65

## 2021-11-01 NOTE — PROGRESS NOTES
[facility]    Progress Note  Hematology / Oncology     Date of Admission:  10/26/2021  Hospital Day #: 6   Date of Service (when I saw the patient): 11/01/2021    Assessment & Plan   Ifrah Huitron is a 71 year old male with PMH rosacea, pituitary macroadenoma s/p resection, CAD, GERD, kidney stones, DJD, and metastatic spindle cell sarcoma who was admitted to begin treatment with Cycle #1 of Doxil + ifosfamide.    Today:  - Day 7 of Doxil/ifos. Mesna to start this evening around 8p, will finish at 12p tomorrow (11/2).   - Ongoing problems with nausea despite initial improvement following Emend given 10/31. Continue Zofran and compazine; trial Ativan as 3rd line if persistent.   - Start NeutraPhos BID given persistent hypophosphatemia. May need to discharge on this; will follow trend.  - Anticipate discharge to home tomorrow, 11/2, following completion of the chemotherapy regimen.    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 Wolff. Patient presented to his PCP in 03/2021 with chest/left shoulder and back pain that led to imaging which revealed multiple lung mets, included a left pleural mass. There were difficulties in getting diagnostic biopsy; eventually, biopsy of the mediastinal mass (5/20/21) revealed a poorly characterized malignant spindle cell neoplasm with high PD-L1 expression (90%), Ki-67 70%. 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 Baseline imaging (6/21/21) revealed progression of lung mets and left-sided subdiaphragmatic mass, stable liver lesions. He was seen by ENT for hoarseness, which was attributed to left true vocal fold motion impairment from mediastinal mass. Given high PD-L1 expression, he was started on Keytruda (C1=6/21/21). Interval imaging (CT 8/2/21) revealed positive response to treatment. He received 6 cycles total,  most recently C6=10/4/21. Unfortunately, restaging imaging (10/18/21) revealed interval increase in size of the LUQ/splenic mass; however, the mediastinal and left pleural mass were stable to slightly decreased in size. He met with Dr. Wolff, who recommended a change in therapy to Doxil + ifosfamide. He was admitted on 10/26/21 to receive Cycle #1 of Doxil/ifos.  - PICC placed on admission; will remove on discharge.                                Treatment Plan: Doxil + ifosfamide (C1D1=10/26/21). Today is Day 7.    - Doxil 45 mg/m2 (80mg) IV once - D1     - Ifosfamide/Mesna 1500 mg/m2 (2790 mg) CIVI - D1-6     - Mesna 1500 mg/m2 (2790 mg) IV over 18 hrs - starting D7     - Neulasta injection - D8, scheduled for 11/4/21 at [facility]     # Cancer-related pain  - Continue PTA Celebrex 200 mg BID   - Continue PTA topical Bengay PRN at bedtime to left back     # Anemia, mild  Noted intermittently. Likely related to chemo, hydration, frequent blood draws.  - Monitor  - Transfuse for Hgb <7     FEN/GI  # Chemotherapy-induced nausea  # Dyspepsia  Intermittent nausea throughout course thus far, more persistent 10/30-11/1. Also notes some abdominal discomfort due to gas (noted on 10/29 AM and improved throughout that same day). He is having bowel movements.   - Scheduled Zofran Q8H  - PRN compazine available  - Received Emend 150 mg IV x1 dose on 10/31 with transient relief  - Trial Ativan 0.5 mg Q6H PRN as 3rd line for nausea given persistence; monitor closely for sedation  - Simethicone PRN      # GERD  - Continue PTA omeprazole  - TUMS and Pepto Bismol available PRN     # Hypophosphatemia  Noted intermittently throughout hospitalization. Likely due to chemotherapy (ifosfamide).   - Follow daily phos while inpatient  - Replete PRN per standing protocol  - Start NeutraPhos 1 packet BID; adjust dose as indicated     CARDS   # CAD   Follows with Dr. Gigi Vernon at [facility]. He was noted to have coronary  calcium on chest CT. CTA 9/29/21 with moderate mid LAD stenosis. Small to moderate caliber ramus branch with severe diffuse disease.  Echo completed 9/29/21 with normal EF and no valvular dysfunction. Repeat ECHO on 10/27 with EF 60-65%, early diastolic dysfunction but otherwise no change from previous.  - Continue PTA rosuvastatin 40 mg daily per patient preference; monitor transaminases closely in the setting of concomitant chemotherapy administration.  - Encourage outpatient cardiology follow-up as recommended     # Elevated blood pressures  BPs ranging from normotensive to hypertensive (with SBP 140s-160) this admission. He states that he runs higher at baseline (though this is based on clinic assessments as he does not typically monitor his BP at home). Appears recent intermittent clinic BPs sit at 140s-150s/80s-90s when seen in person, otherwise many of the visits are currently virtual. He is not on any antihypertensive medications PTA. Unclear if this is related to CIVI fluids, though he is euvolemic on exam and weight is actually downtrended at this time. He is on hydrocortisone as below, no other treatment plan steroids.   - Monitor  - PRN hydralazine (SBP >170 or >100)  - Would recommend outpatient PCP follow-up for further discussion of BP monitoring and management    ENDO  # Panhypopituitarism  # Macroadenoma of the pituitary gland  Diagnosed in 2010. Found to have a pituitary macroadenoma, 1.9 cm in largest dimension. Underwent hypophysectomy on 8/23/10. Subsequent presumptive pituitary deficiency, on empiric meds for adrenal insufficiency and thyroid. Previously followed by Dr. Bill Mccall; plans to establish care with Dr. Jamir Grant on 11/11/21.  - Continue levothyroxine  - Continue hydrocortisone 15 mg qAM and 10 mg qPM  - Continue endocrinology follow-up as scheduled     ENT  # Vocal cord dysfunction  Manifested as hoarseness. Seen by ENT (Dr. Kyree Bearden) on 6/21/21 and noted to have left  true vocal fold motion impairment from mediastinal mass. Underwent injection of ProLaryn (filler/bulking agent) on 7/1/21, which reportedly has been minimally beneficial. Seen in follow-up by Dr. Bearden on 9/1/21 and noted to have minimal improvement; he was then referred to [facility] ([facility]).   - No acute inpatient management issues  - Continue outpatient ENT follow-up as previously scheduled (next scheduled for 12/9/21)     DERM  # Rosacea  - Continue PTA metronidazole gel     FEN:  -IVF per chemotherapy protocol  -Lyte replacement per protocol  -Regular diet as tolerated     Prophy/Misc:  -GI: PTA omeprazole  -DVT: enoxaparin 40 mg daily  -Bowels: Senna/Miralax PRN     Disposition: Inpatient admission to Heme/Onc Team 3 for planned chemotherapy. Anticipate discharge to home pending completion of the chemotherapy regimen, likely 11/2/21.    Follow-up:   - [facility]  - Labs at local [place] clinic: 11/11 and 11/18  - 11/11: outpt Endocrine appt  - [facility]  - 11/23: virtual [facility] visit    Discussed with Dr. Smith.    Rayna Martin PA-C  Hematology/Oncology  Pager: #[phone]    Interval History   No acute overnight events. Ongoing, fairly significant nausea reported. Patient notes he felt some better yesterday after he got the Emend, but now is feeling pretty ill again. He denies vomiting. He is taking compazine and Zofran without much relief. Feeling a little sleepy after his most recent dose of compazine. Appetite is poor; he had some cream of wheat this morning. No abdominal pain. No diarrhea or constipation. No reflux. No cough, chest pain, or SOB. He is happy to hear that his discharge time was able to be moved up tomorrow. We discussed possible addition of Ativan for nausea and he is agreeable. Educated that this might contribute to drowsiness; we agreed that we would try the first dose in the hospital so he can see how he reacts. We also reviewed  his follow-up appointments and feels as though he has a better understanding of this. He denies other questions, concerns, or issues today.     Physical Exam   Vital Signs with Ranges  Temp:  [97.2  F (36.2  C)-98.4  F (36.9  C)] 97.7  F (36.5  C)  Pulse:  [90-98] 92  Resp:  [16-20] 18  BP: (135-161)/(84-91) 161/91  SpO2:  [98 %-99 %] 99 %    I/O last 3 completed shifts:  In: 1146.4 [P.O.:240; I.V.:520; IV Piggyback:386.4]  Out: 2400 [Urine:2400]    Vitals:    10/30/21 0744 10/31/21 0900 11/01/21 0700   Weight: 69.4 kg (152 lb 14.4 oz) 68.6 kg (151 lb 4.8 oz) 68.4 kg (150 lb 12.8 oz)     Constitutional: Pleasant and cooperative male. Seated in a recliner at the bedside. Appears uncomfortable, but non-toxic.  HEENT: NC/AT, MMM.  Respiratory: No increased work of breathing, on RA. Lungs CTAB, no crackles or wheezing.  Cardiovascular: RRR, no murmur noted. No peripheral edema.  GI: Normal bowel sounds, soft, non-distended and non-tender.  MSK: No gross deformities.  Skin: Clean, dry. No rashes or lesions on limited exam.  Neurologic: A&O. Answers questions appropriately. No tremor.  Psych: Calm, appropriate affect  VAD: PICC in RUE, c/d/i.    Medications         celecoxib  200 mg Oral BID     Chemotherapy Infusing-Continuous Infusion   Does not apply Q8H     [START ON 11/2/2021] dextrose 5% water  10-20 mL Intracatheter Daily at 8 pm     [START ON 11/2/2021] dextrose 5% water  10-20 mL Intracatheter Daily at 8 pm     enoxaparin ANTICOAGULANT  40 mg Subcutaneous Q24H     [START ON 11/2/2021] filgrastim (NEUPOGEN/GRANIX) intravenous  5 mcg/kg (Treatment Plan Recorded) Intravenous Daily at 8 pm     heparin lock flush  5-20 mL Intracatheter Q24H     heparin lock flush  5-20 mL Intracatheter Q24H     hydrocortisone  10 mg Oral Daily     hydrocortisone  15 mg Oral Daily     ifosfamide (IFEX) infusion  1,500 mg/m2 (Treatment Plan Recorded) Intravenous Q24H     levothyroxine  75 mcg Oral QAM     LORazepam  0.5 mg Intravenous  Once     mesna (MESNEX) infusion  1,500 mg/m2 (Treatment Plan Recorded) Intravenous Once     metroNIDAZOLE   Topical BID     ondansetron  8 mg Oral Q8H     pantoprazole  40 mg Oral QAM AC     sodium phosphate  15 mmol Intravenous Once     rosuvastatin  40 mg Oral Daily     sodium chloride  1-2 spray Both Nostrils TID     sodium chloride (PF)  10-40 mL Intracatheter Q8H     triamcinolone   Topical BID     Vitamin D3  1,000 Units Oral Daily       Antiinfectives  Anti-infectives (From now, onward)    None          Data   CBC   Recent Labs   Lab 11/01/21  0612 10/31/21  0623 10/30/21  0618 10/29/21  0627   WBC 7.4 8.3 8.7 9.8   RBC 4.59 5.00 4.62 4.58   HGB 12.8* 13.7 12.8* 12.6*   HCT 39.7* 43.7 40.0 40.1   MCV 87 87 87 88   MCH 27.9 27.4 27.7 27.5   MCHC 32.2 31.4* 32.0 31.4*   RDW 13.6 13.7 13.7 13.8    210 211 225       CMP   Recent Labs   Lab 11/01/21  0612 10/31/21  0623 10/30/21  0618 10/29/21  0627    137 139 140   POTASSIUM 3.6 3.8 3.7 3.9   CHLORIDE 109 108 108 108   CO2 25 26 25 28   ANIONGAP 4 3 6 4   GLC 85 79 84 86   BUN 18 16 14 15   CR 0.84 0.85 0.82 0.78   GFRESTIMATED 87 86 88 90   COTY 8.8 9.4 9.3 9.0   MAG 2.5* 2.3 2.4* 2.3   PHOS 1.9* 2.0* 2.7 2.3*   PROTTOTAL 6.6* 7.2 6.5* 6.3*   ALBUMIN 2.7* 3.1* 2.8* 2.7*   BILITOTAL 0.5 0.6 0.5 0.4   ALKPHOS 154* 161* 149 148   AST 39 32 18 24   ALT 39 33 31 34       LFTs   Recent Labs   Lab 11/01/21 0612   PROTTOTAL 6.6*   ALBUMIN 2.7*   BILITOTAL 0.5   ALKPHOS 154*   AST 39   ALT 39

## 2021-11-01 NOTE — PROGRESS NOTES
Nursing Focus: Chemotherapy  D: Positive brisk bright red blood return via PICC. Insertion site is clean/dry/intact, dressing intact with no complaints of pain.  Urine output is recorded in intake Doc Flowsheet.    I: Gave zofran premedications given per order (see electronic medical administration record). Dose #6 of Ifosfamide/Mesna started to infuse over   24 hours. Reviewed pt teaching on chemotherapy side effects.  Pt denies need for further teaching. Chemotherapy double checked per protocol by two chemotherapy competent RN's.   A: Tolerating chemo well. Denies nausea.   P: Continue to monitor urine output and symptoms of nausea. Screen for symptoms of toxicity.

## 2021-11-01 NOTE — PLAN OF CARE
1500 - 2300: C1D6 infusing via picc, nausea seemed to be much improved from Emend, taking his scheduled zofran prior chemo.   Pt able to eat more later this evening and snacking more.   Up ad kaitlynn, frequently walking in hallway, RISHI, last bm 10/30.  Continue with poc...

## 2021-11-01 NOTE — PLAN OF CARE
0700 - 1500: IV Phosphorus replaced for 1.9, recheck at 2100  Neuro intact, A&O, AVSS, BP elevated in 160's, came down to 125/75  Denies pain, c/o intermittently nauseated, not vomiting, gave prn compazine & on scheduled zofran.   PA added ativan q4 hrs available.  Up independently in mehdi, RISHI, last bm 10/31.  Continue with poc..

## 2021-11-01 NOTE — PLAN OF CARE
Time: 4020-1465    Afebrile with VSS on RA. Denies pain/nausea. Improved mood and appetite post-Emend. Would like to eat tomato soup tomorrow. Up ad kaitlynn. Adequate UO. LBM was 10/31, denies diarrhea/constipation. Last dose #6 CIVI chemo Ifos/mesna infusing into PICC. To receive Mesna today at 2000. Continue to monitor.

## 2021-11-02 VITALS
OXYGEN SATURATION: 98 % | SYSTOLIC BLOOD PRESSURE: 130 MMHG | WEIGHT: 189.4 LBS | RESPIRATION RATE: 16 BRPM | BODY MASS INDEX: 29.73 KG/M2 | TEMPERATURE: 97.5 F | HEART RATE: 86 BPM | DIASTOLIC BLOOD PRESSURE: 67 MMHG | HEIGHT: 67 IN

## 2021-11-02 PROBLEM — D64.9 ANEMIA: Status: ACTIVE | Noted: 2021-11-02

## 2021-11-02 PROBLEM — T45.1X5A CHEMOTHERAPY-INDUCED NAUSEA: Status: ACTIVE | Noted: 2021-11-02

## 2021-11-02 PROBLEM — E83.39 HYPOPHOSPHATEMIA: Status: ACTIVE | Noted: 2021-11-02

## 2021-11-02 PROBLEM — R11.0 CHEMOTHERAPY-INDUCED NAUSEA: Status: ACTIVE | Noted: 2021-11-02

## 2021-11-02 LAB
ALBUMIN SERPL-MCNC: 3 G/DL (ref 3.4–5)
ALP SERPL-CCNC: 174 U/L (ref 40–150)
ALT SERPL W P-5'-P-CCNC: 38 U/L (ref 0–70)
ANION GAP SERPL CALCULATED.3IONS-SCNC: 6 MMOL/L (ref 3–14)
AST SERPL W P-5'-P-CCNC: 44 U/L (ref 0–45)
BASOPHILS # BLD MANUAL: 0 10E3/UL (ref 0–0.2)
BASOPHILS NFR BLD MANUAL: 0 %
BILIRUB SERPL-MCNC: 0.3 MG/DL (ref 0.2–1.3)
BUN SERPL-MCNC: 17 MG/DL (ref 7–30)
CALCIUM SERPL-MCNC: 8.7 MG/DL (ref 8.5–10.1)
CHLORIDE BLD-SCNC: 109 MMOL/L (ref 94–109)
CO2 SERPL-SCNC: 23 MMOL/L (ref 20–32)
CREAT SERPL-MCNC: 0.88 MG/DL (ref 0.66–1.25)
EOSINOPHIL # BLD MANUAL: 0.3 10E3/UL (ref 0–0.7)
EOSINOPHIL NFR BLD MANUAL: 4 %
ERYTHROCYTE [DISTWIDTH] IN BLOOD BY AUTOMATED COUNT: 13.4 % (ref 10–15)
GFR SERPL CREATININE-BSD FRML MDRD: 85 ML/MIN/1.73M2
GLUCOSE BLD-MCNC: 89 MG/DL (ref 70–99)
HCT VFR BLD AUTO: 39.1 % (ref 40–53)
HGB BLD-MCNC: 12.6 G/DL (ref 13.3–17.7)
LYMPHOCYTES # BLD MANUAL: 0.9 10E3/UL (ref 0.8–5.3)
LYMPHOCYTES NFR BLD MANUAL: 13 %
MAGNESIUM SERPL-MCNC: 2.4 MG/DL (ref 1.6–2.3)
MCH RBC QN AUTO: 27.6 PG (ref 26.5–33)
MCHC RBC AUTO-ENTMCNC: 32.2 G/DL (ref 31.5–36.5)
MCV RBC AUTO: 86 FL (ref 78–100)
MONOCYTES # BLD MANUAL: 0.1 10E3/UL (ref 0–1.3)
MONOCYTES NFR BLD MANUAL: 2 %
NEUTROPHILS # BLD MANUAL: 5.9 10E3/UL (ref 1.6–8.3)
NEUTROPHILS NFR BLD MANUAL: 81 %
PHOSPHATE SERPL-MCNC: 2.9 MG/DL (ref 2.5–4.5)
PLAT MORPH BLD: NORMAL
PLATELET # BLD AUTO: 172 10E3/UL (ref 150–450)
POTASSIUM BLD-SCNC: 4 MMOL/L (ref 3.4–5.3)
PROT SERPL-MCNC: 6.5 G/DL (ref 6.8–8.8)
RBC # BLD AUTO: 4.57 10E6/UL (ref 4.4–5.9)
RBC MORPH BLD: NORMAL
SODIUM SERPL-SCNC: 138 MMOL/L (ref 133–144)
WBC # BLD AUTO: 7.3 10E3/UL (ref 4–11)

## 2021-11-02 PROCEDURE — 250N000013 HC RX MED GY IP 250 OP 250 PS 637: Performed by: PHYSICIAN ASSISTANT

## 2021-11-02 PROCEDURE — 83735 ASSAY OF MAGNESIUM: CPT | Performed by: PHYSICIAN ASSISTANT

## 2021-11-02 PROCEDURE — 82040 ASSAY OF SERUM ALBUMIN: CPT | Performed by: PHYSICIAN ASSISTANT

## 2021-11-02 PROCEDURE — 999N000007 HC SITE CHECK

## 2021-11-02 PROCEDURE — 250N000011 HC RX IP 250 OP 636: Performed by: PHYSICIAN ASSISTANT

## 2021-11-02 PROCEDURE — 99238 HOSP IP/OBS DSCHRG MGMT 30/<: CPT | Performed by: INTERNAL MEDICINE

## 2021-11-02 PROCEDURE — 84100 ASSAY OF PHOSPHORUS: CPT | Performed by: PHYSICIAN ASSISTANT

## 2021-11-02 PROCEDURE — 250N000009 HC RX 250: Performed by: INTERNAL MEDICINE

## 2021-11-02 PROCEDURE — 85027 COMPLETE CBC AUTOMATED: CPT | Performed by: PHYSICIAN ASSISTANT

## 2021-11-02 PROCEDURE — 36592 COLLECT BLOOD FROM PICC: CPT | Performed by: PHYSICIAN ASSISTANT

## 2021-11-02 PROCEDURE — 258N000003 HC RX IP 258 OP 636: Performed by: INTERNAL MEDICINE

## 2021-11-02 RX ORDER — LORAZEPAM 0.5 MG/1
0.5 TABLET ORAL EVERY 4 HOURS PRN
Qty: 30 TABLET | Refills: 0 | Status: SHIPPED | OUTPATIENT
Start: 2021-11-02 | End: 2022-03-09

## 2021-11-02 RX ORDER — ONDANSETRON 4 MG/1
4-8 TABLET, FILM COATED ORAL EVERY 8 HOURS PRN
Qty: 40 TABLET | Refills: 1 | Status: SHIPPED | OUTPATIENT
Start: 2021-11-02 | End: 2022-01-12

## 2021-11-02 RX ORDER — PROCHLORPERAZINE MALEATE 5 MG
5 TABLET ORAL EVERY 6 HOURS PRN
Qty: 30 TABLET | Refills: 1 | Status: SHIPPED | OUTPATIENT
Start: 2021-11-02 | End: 2022-01-01

## 2021-11-02 RX ADMIN — HYDROCORTISONE 15 MG: 5 TABLET ORAL at 08:46

## 2021-11-02 RX ADMIN — Medication 1000 UNITS: at 08:47

## 2021-11-02 RX ADMIN — TRIAMCINOLONE ACETONIDE: 1 CREAM TOPICAL at 08:50

## 2021-11-02 RX ADMIN — Medication 5 ML: at 03:46

## 2021-11-02 RX ADMIN — LORAZEPAM 0.5 MG: 0.5 TABLET ORAL at 00:59

## 2021-11-02 RX ADMIN — LORAZEPAM 0.5 MG: 0.5 TABLET ORAL at 05:05

## 2021-11-02 RX ADMIN — METRONIDAZOLE: 7.5 CREAM TOPICAL at 08:50

## 2021-11-02 RX ADMIN — LEVOTHYROXINE SODIUM 75 MCG: 0.05 TABLET ORAL at 06:07

## 2021-11-02 RX ADMIN — PANTOPRAZOLE SODIUM 40 MG: 40 TABLET, DELAYED RELEASE ORAL at 08:47

## 2021-11-02 RX ADMIN — CELECOXIB 200 MG: 200 CAPSULE ORAL at 08:47

## 2021-11-02 RX ADMIN — POTASSIUM & SODIUM PHOSPHATES POWDER PACK 280-160-250 MG 1 PACKET: 280-160-250 PACK at 08:47

## 2021-11-02 ASSESSMENT — ACTIVITIES OF DAILY LIVING (ADL)
ADLS_ACUITY_SCORE: 4

## 2021-11-02 ASSESSMENT — MIFFLIN-ST. JEOR: SCORE: 1562.74

## 2021-11-02 NOTE — DISCHARGE SUMMARY
[facility]  Discharge Summary  Hematology / Oncology    Date of Admission:  10/26/2021  Date of Discharge:  11/02/2021  Discharging Provider: Rayna Martin  Date of Service (when I saw the patient): 11/02/2021    Discharge Diagnoses   Active Problems:    Spindle cell sarcoma (H)    Chemotherapy-induced nausea    Hypophosphatemia    Anemia    History of Present Illness    Ifrah Huitron is a 71 year old male with PMH rosacea, pituitary macroadenoma s/p resection, CAD, GERD, kidney stones, DJD, and metastatic spindle cell sarcoma who was admitted to begin treatment with Cycle #1 of palliative Doxil + ifosfamide. Ifrah overall tolerated chemotherapy reasonably well, with no major adverse effects or neurotoxicity; however, he did have moderate chemotherapy-induced nausea. This was reasonably well-controlled with scheduled Zofran and PRN compazine and Ativan. He also received a single dose of IV Emend on Day 6 with some benefit. Patient was educated at length about his upcoming follow-up schedule, new medications, and hospital return precautions. He was provided with the number for clinic triage and understands signs and symptoms that should prompt a call to clinic triage or a visit to the nearest ED. He has close, scheduled outpatient follow-up as detailed below. On the day of discharge, Ifrah was non-toxic appearing, hemodynamically stable, and felt safe and comfortable with the plans for discharge and follow-up.    Outpatient follow-up issues:  - Monitor nausea symptoms. May require trial of additional anti-emetics as an outpatient. Would consider scheduling Emend with subsequent cycles.    New discharge medications:  - Zofran  - Compazine  - Ativan  - NeutraPhos x6 doses    Next follow-up:  - 11/4: Labs + Neulasta; requested PRN Emend infusion as well, await scheduling  - 11/11: Labs  - 11/18: Labs  - 11/22: Labs + restaging CT  - 11/23: follow-up with [facility]  Eileen Huitron was admitted on 10/26/2021.  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 Wolff. Patient presented to his PCP in 03/2021 with chest/left shoulder and back pain that led to imaging which revealed multiple lung mets, included a left pleural mass. There were difficulties in getting diagnostic biopsy; eventually, biopsy of the mediastinal mass (5/20/21) revealed a poorly characterized malignant spindle cell neoplasm with high PD-L1 expression (90%), Ki-67 70%. 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 Insufficient material to send [facility]. Baseline imaging (6/21/21) revealed progression of lung mets and left-sided subdiaphragmatic mass, stable liver lesions. He was seen by ENT for hoarseness, which was attributed to left true vocal fold motion impairment from mediastinal mass. Given high PD-L1 expression, he was started on Keytruda (C1=6/21/21). Interval imaging (CT 8/2/21) revealed positive response to treatment. He received 6 cycles total, most recently C6=10/4/21. Unfortunately, restaging imaging (10/18/21) revealed interval increase in size of the LUQ/splenic mass; however, the mediastinal and left pleural mass were stable to slightly decreased in size. He met with Dr. Wolff, who recommended a change in therapy to Doxil + ifosfamide. He was admitted on 10/26/21 to receive Cycle #1 of Doxil/ifos.  - PICC placed on admission; removed on discharge.  - Next restaging imaging currently arranged for 11/22/21.                                                        Treatment Plan: Doxil + ifosfamide (C1D1=10/26/21).                            - Doxil 45 mg/m2 (80mg) IV once - D1                             - Ifosfamide/Mesna 1500 mg/m2 (2790 mg) CIVI - D1-6                             - Mesna 1500 mg/m2  (2790 mg) IV over 18 hrs - starting D7                             - Neulasta injection - D8, scheduled for 11/4/21 at [facility]     # Cancer-related pain  - Continue PTA Celebrex 200 mg BID   - Continue PTA topical Bengay PRN at bedtime to left back      # Anemia, mild  Noted intermittently. Likely related to chemo, hydration, frequent blood draws.  - Monitor  - Transfuse for Hgb <7     FEN/GI  # Chemotherapy-induced nausea  # Dyspepsia, resolved  Intermittent nausea throughout course thus far, more persistent 10/30-11/1. Also notes some abdominal discomfort due to gas (noted on 10/29 AM and improved throughout that same day). He is having bowel movements.   - Scheduled Zofran Q8H  - PRN compazine and Ativan  - Received Emend 150 mg IV x1 dose on 10/31 with transient relief; would consider scheduling this with subsequent cycles  - Could consider additional anti-emetics (i.e. Zyprexa, etc.) as indicated     # GERD  - Continue PTA omeprazole  - TUMS and Pepto Bismol available PRN      # Hypophosphatemia, resolved  Noted intermittently throughout hospitalization. Likely due to chemotherapy (ifosfamide).   - Follow phos with weekly labs  - Will discharge with 6 additional doses of NeutraPhos; additional repletion PRN per outpatient team     CARDS   # CAD   Follows with Dr. Gigi Vernon at [email or web address]. He was noted to have coronary calcium on chest CT. CTA 9/29/21 with moderate mid LAD stenosis. Small to moderate caliber ramus branch with severe diffuse disease.  Echo completed 9/29/21 with normal EF and no valvular dysfunction. Repeat ECHO on 10/27 with EF 60-65%, early diastolic dysfunction but otherwise no change from previous.  - Continued PTA rosuvastatin 40 mg daily per patient preference; no transaminitis noted.  - Encourage outpatient cardiology follow-up as recommended     # Elevated blood pressures  BPs ranging from normotensive to hypertensive (with SBP 140s-160) this admission. He states that  he runs higher at baseline (though this is based on clinic assessments as he does not typically monitor his BP at home). Appears recent intermittent clinic BPs sit at 140s-150s/80s-90s when seen in person, otherwise many of the visits are currently virtual. He is not on any antihypertensive medications PTA. DDx includes mild volume overload vs. pain/nausea vs. undiagnosed essential hypertension.  - Monitor  - PRN hydralazine (SBP >170 or >100)  - Would recommend outpatient PCP follow-up for further discussion of BP monitoring and management     ENDO  # Panhypopituitarism  # Macroadenoma of the pituitary gland  Diagnosed in 2010. Found to have a pituitary macroadenoma, 1.9 cm in largest dimension. Underwent hypophysectomy on 8/23/10. Subsequent presumptive pituitary deficiency, on empiric meds for adrenal insufficiency and thyroid. Previously followed by Dr. Bill Mccall; plans to establish care with Dr. Jamir Grant on 11/11/21.  - Continue levothyroxine  - Continue hydrocortisone 15 mg qAM and 10 mg qPM  - Continue endocrinology follow-up as scheduled     ENT  # Vocal cord dysfunction  Manifested as hoarseness. Seen by ENT (Dr. Kyree Bearden) on 6/21/21 and noted to have left true vocal fold motion impairment from mediastinal mass. Underwent injection of ProLaryn (filler/bulking agent) on 7/1/21, which reportedly has been minimally beneficial. Seen in follow-up by Dr. Bearden on 9/1/21 and noted to have minimal improvement; he was then referred to the [facility] ([facility]).   - No acute inpatient management issues  - Continue outpatient ENT follow-up as previously scheduled (next scheduled for 12/9/21)     DERM  # Rosacea  - Continue PTA metronidazole gel    Discussed with Dr. Smith.    Rayna Martin PA-C  Hematology/Oncology  Pager: #[phone]    Code Status   Full Code    Primary Care Physician   Sukhdev Kohler    Physical Exam   Vital Signs with Ranges  Temp:  [96.5  F (35.8  C)-98.3  F (36.8  C)]  96.8  F (36  C)  Pulse:  [65-98] 65  Resp:  [14-18] 16  BP: (125-146)/(75-84) 134/77  SpO2:  [98 %-99 %] 98 %  189 lbs 6.4 oz    Constitutional: Pleasant and cooperative male. Appears mildly uncomfortable, but non-toxic. Alert and conversational.  HEENT: NC/AT, EOMI, sclera clear, conjunctiva normal, oral pharynx with moist mucus membranes, no discrete intraoral lesions or thrush.  Respiratory: No increased work of breathing, CTAB, no crackles or wheezing  Cardiovascular: RRR, no apparent murmur.  GI: Normal bowel sounds, soft, non-distended and non-tender  MSK: Normal muscle bulk and tone, no gross deformities.  Skin: No bruising, bleeding, rashes, or lesions   Neurologic:  Answers questions appropriately. Moves all extremities spontaneously.  Psych: Calm, appropriate affect    Discharge Disposition   Discharged to home  Condition at discharge: Stable    Consultations This Hospital Stay   VASCULAR ACCESS FOR PICC PLACEMENT ADULT IP CONSULT  CARE MANAGEMENT / SOCIAL WORK IP CONSULT    Discharge Orders      Comprehensive metabolic panel     Magnesium     Phosphorus     Care Coordination Referral      Reason for your hospital stay    Chemotherapy (Doxil/ifosfamide regimen) for sarcoma     Activity    Your activity upon discharge: activity as tolerated     Follow Up and recommended labs and tests    An appointment for hospital follow up was requested for you. If it is not scheduled by the time you discharge you will be contacted with the date and time. You may call clinic to makes changes to this appointment if needed.    Already scheduled appointments are listed below.     When to contact your care team    Please call the [facility] at [phone] if you develop temperature above 100.4, shortness of breath, chest pain, headaches, vision changes, bleeding, uncontrolled nausea, vomiting, diarrhea, pain, or any other signs or symptoms of concern. If you are concerned that  your symptoms are life-threatening, don't hesitate to call 911 or go to the nearest Emergency Room.     Discharge Instructions    - Zofran, compazine, and Ativan are all medications to help with nausea. Both compazine and Ativan can cause some drowsiness, so please do not drive or operate machinery when taking these medications.  - Finish the last few doses of the phosphorus supplement. We will recheck your level in a couple of days and decide if you need to continue or not.     Diet    Follow this diet upon discharge: Regular     **CBC with platelets differential FUTURE 2mo     Discharge Medications   Current Discharge Medication List      START taking these medications    Details   LORazepam (ATIVAN) 0.5 MG tablet Take 1 tablet (0.5 mg) by mouth every 4 hours as needed for nausea or vomiting . Caution: causes sedation.  Qty: 30 tablet, Refills: 0    Associated Diagnoses: Chemotherapy-induced nausea      ondansetron (ZOFRAN) 4 MG tablet Take 1-2 tablets (4-8 mg) by mouth every 8 hours as needed for nausea  Qty: 40 tablet, Refills: 1    Associated Diagnoses: Chemotherapy-induced nausea      potassium & sodium phosphates (NEUTRA-PHOS) 280-160-250 MG Packet Take 1 packet by mouth 2 times daily  Qty: 6 packet, Refills: 0    Associated Diagnoses: Hypophosphatemia      prochlorperazine (COMPAZINE) 5 MG tablet Take 1 tablet (5 mg) by mouth every 6 hours as needed for nausea or vomiting . Caution: causes sedation.  Qty: 30 tablet, Refills: 1    Associated Diagnoses: Chemotherapy-induced nausea         CONTINUE these medications which have NOT CHANGED    Details   acetaminophen (TYLENOL) 500 MG tablet Take 500-1,000 mg by mouth every 6 hours as needed for mild pain      calcium carbonate (TUMS) 500 MG chewable tablet Take 1 chew tab by mouth daily      celecoxib (CELEBREX) 200 MG capsule Take 1 capsule (200 mg) by mouth 2 times daily . Note this is a new a strength! Only one capsule at a time!  Qty: 60 capsule, Refills: 3     Associated Diagnoses: Cancer associated pain      cholecalciferol (VITAMIN D-1000 MAX ST) 1000 units TABS Take 1,000 Units by mouth daily       guaiFENesin-codeine (GUAIFENESIN AC) 100-10 MG/5ML syrup Take 10 mLs by mouth every 4 hours as needed for cough  Qty: 118 mL, Refills: 0    Associated Diagnoses: Vocal fold paralysis, left; Dysphonia; Muscle tension dysphonia; Mesothelioma, malignant (H); Cough      hydrocortisone (CORTEF) 10 MG tablet Take 20 mg in the morning and 10 mg in the afternoon      levothyroxine (SYNTHROID/LEVOTHROID) 75 MCG tablet Take 75 mcg by mouth every morning       Menthol-Methyl Salicylate (DALIA MALIK GREASELESS) cream Apply topically every 6 hours as needed      metroNIDAZOLE (METROGEL) 0.75 % external gel Apply topically 2 times daily  Qty: 45 g, Refills: 11    Associated Diagnoses: Rosacea      omeprazole (PRILOSEC) 20 MG DR capsule Take 20 mg by mouth      rosuvastatin (CRESTOR) 40 MG tablet Take 1 tablet (40 mg) by mouth daily  Qty: 90 tablet, Refills: 1    Associated Diagnoses: Hyperlipidemia, unspecified hyperlipidemia type      triamcinolone (KENALOG) 0.1 % external cream Apply topically 2 times daily          STOP taking these medications       HYDROmorphone (DILAUDID) 2 MG tablet Comments:   Reason for Stopping:         SUMAtriptan (IMITREX) 50 MG tablet Comments:   Reason for Stopping:             Allergies   Allergies   Allergen Reactions     Penicillins Other (See Comments) and Hives     swelling, hives         Data   Most Recent 3 CBC's:  Recent Labs   Lab Test 11/02/21  0353 11/01/21  0612 10/31/21  0623   WBC 7.3 7.4 8.3   HGB 12.6* 12.8* 13.7   MCV 86 87 87    193 210      Most Recent 3 BMP's:  Recent Labs   Lab Test 11/02/21  0353 11/01/21  0612 10/31/21  0623    138 137   POTASSIUM 4.0 3.6 3.8   CHLORIDE 109 109 108   CO2 23 25 26   BUN 17 18 16   CR 0.88 0.84 0.85   ANIONGAP 6 4 3   COTY 8.7 8.8 9.4   GLC 89 85 79     Most Recent 2 LFT's:  Recent Labs   Lab  Test 21  0353 21  0612   AST 44 39   ALT 38 39   ALKPHOS 174* 154*   BILITOTAL 0.3 0.5     Most Recent INR's and Anticoagulation Dosing History:  Anticoagulation Dose History     Recent Dosing and Labs Latest Ref Rng & Units 2021    INR 0.86 - 1.14 1.13        Most Recent 3 Troponin's:  Recent Labs   Lab Test 21  1458 19  0909   TROPI  --  <0.015   TROPONIN <0.015  --      Most Recent Cholesterol Panel:No lab results found.  Most Recent 6 Bacteria Isolates From Any Culture (See EPIC Reports for Culture Details):  Recent Labs   Lab Test 21  1254 19  0943 19  0911   CULT Culture negative after 4 weeks  No anaerobes isolated  Since this specimen was not transported in the proper anaerobic transport media, the   absence of anaerobes in this culture does not rule out the presence of anaerobes in this   specimen.    No growth No growth No growth     Most Recent TSH, T4 and A1c Labs:  Recent Labs   Lab Test 10/04/21  1348 21  1349 21  1349   TSH 0.74   < > 0.33*   T4  --   --  1.24    < > = values in this interval not displayed.     Results for orders placed or performed during the hospital encounter of 10/26/21   Echocardiogram Complete     Value    LVEF  60-65%    Narrative    [id number]  [id number]  [id number]  [id number]^RONDA^DILAN^BYRON     [facility],[place]  Echocardiography Laboratory  [address] [place] [id number]     Name: MARISOL XIE  MRN: [id number]  : 1950  Study Date: 10/27/2021 09:05 AM  Age: 71 yrs  Gender: Male  Patient Location: [facility]  Reason For Study: Cardiotoxic Chemotherapy  Ordering Physician: DILAN BOND  Referring Physician: DILAN BOND  Performed By: Fabiola Heath RDCS     BSA: 1.8 m2  Height: 67 in  Weight: 161 lb  HR: 70  BP: 165/89 mmHg  ______________________________________________________________________________  Procedure  Echocardiogram with two-dimensional,  color and spectral Doppler performed.  ______________________________________________________________________________  Interpretation Summary  Left ventricular size, wall motion and function are normal. The ejection  fraction is 60-65%. Global peak LV longitudinal strain is averaged at -18.5%.  This is within reported normal limits (normal <-18%).  Right ventricular function, chamber size, wall motion, and thickness are  normal.  The inferior vena cava was normal in size with preserved respiratory  variability. No pericardial effusion is present.     There has been no change.  ______________________________________________________________________________  Left Ventricle  Left ventricular size, wall motion and function are normal. The ejection  fraction is 60-65%. Grade I or early diastolic dysfunction. Global peak LV  longitudinal strain is averaged at -18.5%. This is within reported normal  limits (normal <-18%). No regional wall motion abnormalities are seen.     Right Ventricle  Right ventricular function, chamber size, wall motion, and thickness are  normal.     Atria  Both atria appear normal.     Mitral Valve  The mitral valve is normal. Trace mitral insufficiency is present.     Aortic Valve  Aortic valve is normal in structure and function. The aortic valve is  tricuspid. No aortic regurgitation is present.     Tricuspid Valve  The tricuspid valve is normal. Trace to mild tricuspid insufficiency is  present. The right ventricular systolic pressure is approximated at 25.8 mmHg  plus the right atrial pressure.     Pulmonic Valve  The pulmonic valve is normal.     Vessels  The aorta root is normal. The inferior vena cava was normal in size with  preserved respiratory variability.     Pericardium  No pericardial effusion is present.     Compared to Previous Study  There has been no change.  ______________________________________________________________________________  MMode/2D Measurements & Calculations  IVSd:  0.76 cm     LVIDd: 4.5 cm  LVIDs: 2.9 cm  LVPWd: 0.78 cm  FS: 34.6 %  LV mass(C)d: 107.8 grams  LV mass(C)dI: 58.5 grams/m2  LVOT diam: 2.3 cm  LVOT area: 4.2 cm2     EF(MOD-bp): 57.9 %  LA Volume Index (BP): 28.0 ml/m2  RWT: 0.35  TAPSE: 1.8 cm     Doppler Measurements & Calculations  MV E max robel: 77.7 cm/sec  MV A max robel: 109.1 cm/sec  MV E/A: 0.71  MV dec slope: 338.2 cm/sec2  MV dec time: 0.23 sec  TR max robel: 254.2 cm/sec  TR max P.8 mmHg  E/E' avg: 10.3  Lateral E/e': 9.9  Medial E/e': 10.7     ______________________________________________________________________________  Report approved by: Ross WINTER 10/27/2021 11:29 AM

## 2021-11-02 NOTE — PROGRESS NOTES
Care Management Discharge Note    Discharge Date: 11/02/2021     Discharge Disposition: Home    Discharge Services: Outpatient Infusion Services    Discharge DME: None    Discharge Transportation: Car, family or friend will provide    Private pay costs discussed: Not applicable    PAS Confirmation Code: N/A   Patient/family educated on Medicare website which has current facility and service quality ratings: N/A    Education Provided on the Discharge Plan: Yes  Persons Notified of Discharge Plans: Patient, bedside RN, SW  Patient/Family in Agreement with the Plan: Yes    Handoff Referral Completed: Yes    Additional Information:    Per team, patient will discharge this afternoon, following completion of chemo. OP follow-up arranged.     No RNCC/SW needs identified for discharge.     Radha Wong, RN, BSN, PHN  Care Coordinator   P: [phone], [facility]

## 2021-11-02 NOTE — PLAN OF CARE
8485-7231: VSS, pt afebrile on RA. Pt denies pain or dyspnea. Pt experiencing intermittent nausea; stating feeling better after Ativan. Mesna infusing via PICC. Pt to discharge home once completed. PICC removed, pt tolerate well. Pt UAL. Pt voiding spontaneously with good UOP. Continue POC.      Discharge  D: Orders for discharge and outpatient medications written.  I: Home medications and return to clinic schedule reviewed with patient. Discharge instructions and parameters for calling Health Care Provider reviewed. Patient left at 1430 accompanied by wife.   A: Patient/family verbalized understanding and was ready for discharge.   P: Patient instructed to  medications in Pharmacy. Follow up as scheduled 11/4 in clinic.

## 2021-11-02 NOTE — PLAN OF CARE
"Time 2952-6802    BP (!) 146/83 (BP Location: Left arm)   Pulse 98   Temp 98.3  F (36.8  C) (Oral)   Resp 18   Ht 1.702 m (5' 7\")   Wt 68.4 kg (150 lb 12.8 oz)   SpO2 99%   BMI 23.62 kg/m      Reason for admission: metastatic spindle cell sarcoma   Activity: Independent  Pain: Denies  Neuro: R sided facial droop, not new  Cardiac: wnl  Respiratory: wnl  GI/: Urine output adequate, last BM 10/31  Diet: Regular, appetite fair. Has had intermittent nausea, scheduled zofran done, added ativan prn  Lines: PICC   Labs: phos 2.4, IV replacement started recheck in the am      New changes this shift: Isosfamide/mesna finished 2130. Started mesna infusion.     Plan: discharge 11/2 if nausea is controlled       Continue to monitor and follow POC   "

## 2021-11-02 NOTE — PLAN OF CARE
Time: 4854-3270    Afebrile with max BP of 142/84, OVSS on RA. A/Ox4, denies pain/SOB. Intermittent nausea, PRN ativan given x2 and  alleviated symptoms. Up ad kaitlynn, talked with wife before bed. Adequate UO. LBM was 10/31. Phos recheck was 2.4. When phos arrived on floor, AM labs drawn and pt no longer needed replacement for level of 2.9.  Mesna infusing into PICC. Tentative discharge around 1200 when Mesna is done.

## 2021-11-03 ENCOUNTER — PATIENT OUTREACH (OUTPATIENT)
Dept: NURSING | Facility: CLINIC | Age: 71
End: 2021-11-03
Payer: MEDICARE

## 2021-11-03 ENCOUNTER — PATIENT OUTREACH (OUTPATIENT)
Dept: CARE COORDINATION | Facility: CLINIC | Age: 71
End: 2021-11-03

## 2021-11-03 DIAGNOSIS — R11.0 CHEMOTHERAPY-INDUCED NAUSEA: Primary | ICD-10-CM

## 2021-11-03 DIAGNOSIS — T45.1X5A CHEMOTHERAPY-INDUCED NAUSEA: Primary | ICD-10-CM

## 2021-11-03 DIAGNOSIS — C49.9 SPINDLE CELL SARCOMA (H): Primary | ICD-10-CM

## 2021-11-03 DIAGNOSIS — C49.9 SPINDLE CELL SARCOMA (H): ICD-10-CM

## 2021-11-03 SDOH — HEALTH STABILITY: PHYSICAL HEALTH: ON AVERAGE, HOW MANY DAYS PER WEEK DO YOU ENGAGE IN MODERATE TO STRENUOUS EXERCISE (LIKE A BRISK WALK)?: 0 DAYS

## 2021-11-03 SDOH — ECONOMIC STABILITY: FOOD INSECURITY: WITHIN THE PAST 12 MONTHS, THE FOOD YOU BOUGHT JUST DIDN'T LAST AND YOU DIDN'T HAVE MONEY TO GET MORE.: NEVER TRUE

## 2021-11-03 SDOH — HEALTH STABILITY: PHYSICAL HEALTH: ON AVERAGE, HOW MANY MINUTES DO YOU ENGAGE IN EXERCISE AT THIS LEVEL?: 0 MIN

## 2021-11-03 SDOH — ECONOMIC STABILITY: TRANSPORTATION INSECURITY
IN THE PAST 12 MONTHS, HAS THE LACK OF TRANSPORTATION KEPT YOU FROM MEDICAL APPOINTMENTS OR FROM GETTING MEDICATIONS?: NO

## 2021-11-03 SDOH — ECONOMIC STABILITY: TRANSPORTATION INSECURITY
IN THE PAST 12 MONTHS, HAS LACK OF TRANSPORTATION KEPT YOU FROM MEETINGS, WORK, OR FROM GETTING THINGS NEEDED FOR DAILY LIVING?: NO

## 2021-11-03 SDOH — ECONOMIC STABILITY: FOOD INSECURITY: WITHIN THE PAST 12 MONTHS, YOU WORRIED THAT YOUR FOOD WOULD RUN OUT BEFORE YOU GOT MONEY TO BUY MORE.: NEVER TRUE

## 2021-11-03 ASSESSMENT — SOCIAL DETERMINANTS OF HEALTH (SDOH)
HOW OFTEN DO YOU ATTENT MEETINGS OF THE CLUB OR ORGANIZATION YOU BELONG TO?: 1 TO 4 TIMES PER YEAR
HOW HARD IS IT FOR YOU TO PAY FOR THE VERY BASICS LIKE FOOD, HOUSING, MEDICAL CARE, AND HEATING?: NOT VERY HARD
DO YOU BELONG TO ANY CLUBS OR ORGANIZATIONS SUCH AS CHURCH GROUPS UNIONS, FRATERNAL OR ATHLETIC GROUPS, OR SCHOOL GROUPS?: YES
IN A TYPICAL WEEK, HOW MANY TIMES DO YOU TALK ON THE PHONE WITH FAMILY, FRIENDS, OR NEIGHBORS?: TWICE A WEEK
HOW OFTEN DO YOU ATTEND CHURCH OR RELIGIOUS SERVICES?: NEVER
HOW OFTEN DO YOU GET TOGETHER WITH FRIENDS OR RELATIVES?: TWICE A WEEK

## 2021-11-03 ASSESSMENT — ACTIVITIES OF DAILY LIVING (ADL)
DEPENDENT_IADLS:: INDEPENDENT
DEPENDENT_IADLS:: INDEPENDENT

## 2021-11-03 NOTE — LETTER
[facility] CARE COORDINATION  [address] [address]    November 3, 2021    Ifrah Huitron  [address] [place] [address]      Dear Ifrah,    I am a clinic care coordinator who works with Sukhdev Kohler MD at [facility]   I wanted to thank you for spending the time to talk with me.  Below is a description of clinic care coordination and how I can further assist you.      The clinic care coordination team is made up of a registered nurse,  and community health worker who understand the health care system. The goal of clinic care coordination is to help you manage your health and improve access to the health care system in the most efficient manner. The team can assist you in meeting your health care goals by providing education, coordinating services, strengthening the communication among your providers and supporting you with any resource needs.    Please feel free to contact me at [phone] with any questions or concerns. We are focused on providing you with the highest-quality healthcare experience possible and that all starts with you.     Sincerely,     [facility]   Oliva García RN, Care Coordinator   [facility]'s   E-mail [email or web address]   [phone]    Enclosed: I have enclosed a copy of the Patient Centered Plan of Care. This has helpful information and goals that we have talked about. Please keep this in an easy to access place to use as needed.

## 2021-11-03 NOTE — PROGRESS NOTES
Clinic Care Coordination Contact    Clinic Care Coordination Contact  OUTREACH with Post Discharge Assessment    Referral Information:  Referral Source: IP Report    Primary Diagnosis: Oncology    Chief Complaint   Patient presents with     Clinic Care Coordination - Post Hospital     Clinic Care Coordination RN        Universal Utilization:   [employer]  Discharge Summary  Hematology / Oncology     Date of Admission:  10/26/2021  Date of Discharge:  11/02/2021  Discharging Provider: Rayna Martin  Date of Service (when I saw the patient): 11/02/2021        Discharge Diagnoses     Active Problems:    Spindle cell sarcoma (H)    Chemotherapy-induced nausea    Hypophosphatemia    Anemia     Clinic Utilization  Difficulty keeping appointments:: No  Compliance Concerns: No  No-Show Concerns: No  No PCP office visit in Past Year: No  Utilization    Hospital Admissions  7             ED Visits  1             No Show Count (past year)  0                Current as of: 11/3/2021  2:07 AM              Clinical Concerns:  Current Medical Concerns:  No    Current Behavioral Concerns: No    Education Provided to patient:CC RN role introduced  Introductory letter and care plan sent via My Chart   Pain  Pain (GOAL):: No  Health Maintenance Reviewed: Due/Overdue   Health Maintenance Due   Topic Date Due     URINE DRUG SCREEN  Never done     ADVANCE CARE PLANNING  Never done     Pneumococcal Vaccine: 65+ Years (1 of 4 - PCV13) Never done     HEPATITIS C SCREENING  Never done     LIPID  Never done     ZOSTER IMMUNIZATION (1 of 2) Never done     MEDICARE ANNUAL WELLNESS VISIT  Never done     DTAP/TDAP/TD IMMUNIZATION (2 - Td or Tdap) 11/30/2016     INFLUENZA VACCINE (1) Never done     COVID-19 Vaccine (3 - Booster for Moderna series) 10/15/2021     Clinical Pathway: None    Admission:    Admission Date: 10/26/21   Reason for Admission: Chemotherapy  Discharge:   Discharge Date:  11/02/21  Discharge Diagnosis: Spindle cell sarcoma (H)  Chemotherapy-induced nausea  Hypophosphatemia  Anemia    Enrollment  Primary Care Care Coordination Status: Enrolled  Outreach Frequency: weekly    Discharge Assessment  How are you doing now that you are home?: Better, Zofran and Ativan are helping his stomach discomfort/nausea . Treatments will be 7 days a week for 6 months  How are your symptoms? (Red Flag symptoms escalate to triage hotline per guidelines): Improved  Do you feel your condition is stable enough to be safe at home until your provider visit?: Yes  Does the patient have their discharge instructions? : Yes  Does the patient have questions regarding their discharge instructions? : No  Were you started on any new medications or were there changes to any of your previous medications? : Yes  Does the patient have all of their medications?: Yes  Do you have questions regarding any of your medications? : No  Do you have all of your needed medical supplies or equipment (DME)?  (i.e. oxygen tank, CPAP, cane, etc.): Yes  Discharge follow-up appointment scheduled within 14 calendar days? : Yes  Discharge Follow Up Appointment Date: 11/11/21  Discharge Follow Up Appointment Scheduled with?: Specialty Care Provider         Post-op (Clinicians Only)  Did the patient have surgery or a procedure: No  Eating & Drinking: eating and drinking without complaints/concerns  PO Intake: regular diet  Bowel Function: normal  Urinary Status: voiding without complaint/concerns    Medication Management:  Medication review status: Medications reviewed.  Changes noted per patient report.       Functional Status:  Dependent ADLs:: Independent  Dependent IADLs:: Independent  Bed or wheelchair confined:: No  Mobility Status: Independent    Living Situation:  Current living arrangement:: I live in a private home with spouse    Lifestyle & Psychosocial Needs:    Social Determinants of Health     Tobacco Use: Low Risk       Smoking Tobacco Use: Never Smoker     Smokeless Tobacco Use: Never Used   Alcohol Use:      Frequency of Alcohol Consumption:      Average Number of Drinks:      Frequency of Binge Drinking:    Financial Resource Strain: Low Risk      Difficulty of Paying Living Expenses: Not very hard   Food Insecurity: No Food Insecurity     Worried About Running Out of Food in the Last Year: Never true     Ran Out of Food in the Last Year: Never true   Transportation Needs: No Transportation Needs     Lack of Transportation (Medical): No     Lack of Transportation (Non-Medical): No   Physical Activity: Inactive     Days of Exercise per Week: 0 days     Minutes of Exercise per Session: 0 min   Stress:      Feeling of Stress :    Social Connections: Moderately Integrated     Frequency of Communication with Friends and Family: Twice a week     Frequency of Social Gatherings with Friends and Family: Twice a week     Attends Shinto Services: Never     Active Member of Clubs or Organizations: Yes     Attends Club or Organization Meetings: 1 to 4 times per year     Marital Status:    Intimate Partner Violence: Unknown     Fear of Current or Ex-Partner: No     Emotionally Abused: No     Physically Abused: Not on file     Sexually Abused: No   Depression: Not at risk     PHQ-2 Score: 0   Housing Stability:      Unable to Pay for Housing in the Last Year:      Number of Places Lived in the Last Year:      Unstable Housing in the Last Year:      Diet:: Regular  Inadequate nutrition (GOAL):: No  Tube Feeding: No  Inadequate activity/exercise (GOAL):: No  Significant changes in sleep pattern (GOAL): No  Transportation means:: Family     Shinto or spiritual beliefs that impact treatment:: No  Mental health DX:: No  Mental health management concern (GOAL):: No  Chemical Dependency Status: No Current Concerns  Informal Support system:: Spouse        Resources and Interventions:  Current Resources:      Community Resources: OP  "Infusion  Supplies used at home:: None  Equipment Currently Used at Home: none  Employment Status: employed full-time         Advance Care Plan/Directive  Advanced Care Plans/Directives on file:: No    Referrals Placed: None     Goals:   Goals        General     Medical (pt-stated)      Notes - Note edited  11/3/2021  2:23 PM by Oliva García RN     Goal Statement: I want to \"whip\" this cancer   Date Goal set: 11/3/2021  Barriers: None identified   Strengths: Motivated and great family support   Date to Achieve By: 2/3/2022  Patient expressed understanding of goal: Yes  Action steps to achieve this goal:  1. I will keep my future  Primary care provider,ENT,Endocrinology and Oncology appointments   2. I will take my medications as directed               Patient/Caregiver understanding: Patient expresses good understanding of discharge instructions     Outreach Frequency: weekly  Future Appointments              Tomorrow ROOM 5 [place]; [place] CANCER INFUSION NURSE [facility] LAK    In 1 week CL LAB [facility] Laboratory, FLCL    In 1 week Jamir Grant MD [facility] Endocrinology, FLWY    In 2 weeks CL LAB [facility] Laboratory, FLCL    In 2 weeks [place] LAB [facility] Laboratory, FLWY    In 2 weeks W[place]CT1 [facility]    In 2 weeks Maynor Rios PA [facility] Cancer [facility] YEISON    In 4 weeks Fabiola Armstrong PA-C [facility]    In 1 month Provider, Arnaldo Ent Dysphonia Slp [facility] Voice Clinic [facility]    In 1 month Sherry Esquivel MD [facility] Ear Nose and Throat Clinic [facility]    In 1 month Maynor Rios PA [facility] Cancer [facility] RID          Plan:   1. Patient will make a hospital follow up with PCP( for blood pressure " check)  2. Patient will keep future Oncology,Endocrinology and ENT appointments   3. Patient will take nausea medication as directed   4. CC RN will follow up in 3-5 business days   [facility]   Oliva García RN, Care Coordinator   [facility]'s   E-mail [email or web address]   [phone]

## 2021-11-03 NOTE — LETTER
[facility]  Patient Centered Plan of Care  About Me:        Patient Name:  Ifrah Huitron    YOB: 1950  Age:         71 year old   [place] MRN:    [id number] Telephone Information:  Home Phone [phone]   Mobile [phone]       Address:  [address] [place] [address] Email address:  [email or web address]      Emergency Contact(s)    Name Relationship Lgl Grd Work Phone Home Phone Mobile Phone   1. SARA HUITRON Spouse   [phone]   2. ALLI HUITRON Other   [phone]            Primary language:  English     needed? No   [place] [facility]:  [phone] op. 1  Other communication barriers:Glasses    Preferred Method of Communication:     Current living arrangement: I live in a private home with spouse    Mobility Status/ Medical Equipment: Independent        Health Maintenance  Health Maintenance Reviewed: Due/Overdue   Health Maintenance Due   Topic Date Due     URINE DRUG SCREEN  Never done     ADVANCE CARE PLANNING  Never done     Pneumococcal Vaccine: 65+ Years (1 of 4 - PCV13) Never done     HEPATITIS C SCREENING  Never done     LIPID  Never done     ZOSTER IMMUNIZATION (1 of 2) Never done     MEDICARE ANNUAL WELLNESS VISIT  Never done     DTAP/TDAP/TD IMMUNIZATION (2 - Td or Tdap) 11/30/2016     INFLUENZA VACCINE (1) Never done     COVID-19 Vaccine (3 - Booster for Moderna series) 10/15/2021     My Access Plan  Medical Emergency 911   Primary Clinic Line [facility] - [phone]   24 Hour Appointment Line [phone] or  [phone]) (toll-free)   24 Hour Nurse Line [phone] (toll-free)   Preferred Urgent Care [facility] [phone]     Preferred Hospital [facility]  [phone]     Preferred Pharmacy [facility] [place] - [address] [facility]     Behavioral Health Crisis Line  The National Suicide Prevention Lifeline at 1-510.442.9369 or 911       My Care Team Members  Patient Care Team       Relationship Specialty Notifications Start End    Sukhdev Kohler MD PCP - General Family Practice  5/23/19     Phone: [phone] Fax: [phone]         [address] [facility] [address]    Sukhdev Kohler MD Assigned PCP   4/26/19     Phone: [phone] Fax: [phone] [address] [facility] [address]    David Hope MD Assigned Pulmonology Provider   4/18/21     Phone: [phone] Fax: [phone]         [address] [facility] [address] [facility] [address]    Luke Wolff MD MD Medical Oncology All results, Admissions 5/27/21     Phone: [phone] Fax: [phone]         [address] [facility] [address] [facility] [address]    Kyree Bearden MD Assigned Surgical Provider   6/27/21     Phone: [phone] Fax: [phone]         [address] [facility] [address]    Fabiola Armstrong PA-C Physician Assistant Dermatology  7/12/21     Attending provider    Phone: [phone] Fax: [phone]         [address] [facility] [address]    Sal Handy MD Assigned Palliative Care Provider   7/16/21     Phone: [phone] Fax: [phone]         [address] [facility] Palliative Care [facility] [address]    Maynor Rios PA Assigned Cancer Care Provider   7/18/21     Phone: [phone] Fax: [phone]         [address] [facility] [address]    Jay Jay Hernandez MD Cardiologist Cardiovascular Disease  9/10/21     Phone: [phone] Fax: [phone]         [facility] [address] [facility] [address] [facility] [address]    Jay Jay Hernandez MD Assigned Heart and Vascular Provider   9/26/21     Phone: [phone] Fax: [phone]         [address] [address]    Jamir Grant MD MD Endocrinology, Diabetes, and  "Metabolism  10/5/21     Phone: [phone] Fax: [phone] [address] [facility] [address]    Luiz Castaneda, RN Specialty Care Coordinator Hematology & Oncology Admissions 11/1/21     Phone: [phone] Pager: [phone]        Oliva García, RN Lead Care Coordinator Primary Care - CC Admissions 11/3/21     Phone: [phone]                 My Care Plans  Self Management and Treatment Plan  Goals and (Comments)  Goals        General     Medical (pt-stated)      Notes - Note edited  11/3/2021  2:23 PM by Oliva García, RN     Goal Statement: I want to \"whip\" this cancer   Date Goal set: 11/3/2021  Barriers: None identified   Strengths: Motivated and great family support   Date to Achieve By: 2/3/2022  Patient expressed understanding of goal: Yes  Action steps to achieve this goal:  1. I will keep my future  Primary care provider,ENT,Endocrinology and Oncology appointments   2. I will take my medications as directed                Action Plans on File: None       Advance Care Plans/Directives Type: None      My Medical and Care Information  Problem List   Patient Active Problem List   Diagnosis     Calculus of kidney     Degeneration of lumbar or lumbosacral intervertebral disc     Eczema     Epidural lipomatosis     TUYET (generalized anxiety disorder)     Hypopituitarism (H)     Hypothyroidism     Osteoarthritis of spine with radiculopathy, lumbar region     Pituitary macroadenoma (H)     Rosacea     Chronic lower back pain     Spindle cell sarcoma (H)     Mesothelioma, malignant (H)     Vocal fold paralysis, left     Dysphonia     Muscle tension dysphonia     Mediastinal lymphadenopathy     Inguinal hernia     Chemotherapy-induced nausea     Hypophosphatemia     Anemia      Current Medications and Allergies:    Current Outpatient Medications   Medication     acetaminophen (TYLENOL) 500 MG tablet     calcium carbonate (TUMS) 500 MG chewable tablet     celecoxib (CELEBREX) 200 MG capsule     " cholecalciferol (VITAMIN D-1000 MAX ST) 1000 units TABS     guaiFENesin-codeine (GUAIFENESIN AC) 100-10 MG/5ML syrup     hydrocortisone (CORTEF) 10 MG tablet     levothyroxine (SYNTHROID/LEVOTHROID) 75 MCG tablet     LORazepam (ATIVAN) 0.5 MG tablet     Menthol-Methyl Salicylate (DALIA MALIK GREASELESS) cream     metroNIDAZOLE (METROGEL) 0.75 % external gel     omeprazole (PRILOSEC) 20 MG DR capsule     ondansetron (ZOFRAN) 4 MG tablet     potassium & sodium phosphates (NEUTRA-PHOS) 280-160-250 MG Packet     prochlorperazine (COMPAZINE) 5 MG tablet     rosuvastatin (CRESTOR) 40 MG tablet     triamcinolone (KENALOG) 0.1 % external cream     No current facility-administered medications for this visit.       Care Coordination Start Date: 11/3/2021   Frequency of Care Coordination: weekly     Form Last Updated: 11/03/2021

## 2021-11-03 NOTE — PROGRESS NOTES
Clinic Care Coordination Contact  [facility]/Voicemail    Referral Source: IP Report  Clinical Data: Care Coordinator Outreach  Outreach attempted x 1.  Left message on patient's voicemail with call back information and requested return call.  Plan: Care Coordinator will try to reach patient again in 1-2 business days.  [facility]   Oliva García RN, Care Coordinator   [facility]'s   E-mail [email or web address]   [phone]

## 2021-11-04 ENCOUNTER — INFUSION THERAPY VISIT (OUTPATIENT)
Dept: INFUSION THERAPY | Facility: CLINIC | Age: 71
End: 2021-11-04
Attending: INTERNAL MEDICINE
Payer: MEDICARE

## 2021-11-04 VITALS
WEIGHT: 154.4 LBS | HEART RATE: 105 BPM | TEMPERATURE: 97.6 F | BODY MASS INDEX: 24.18 KG/M2 | SYSTOLIC BLOOD PRESSURE: 124 MMHG | DIASTOLIC BLOOD PRESSURE: 76 MMHG | RESPIRATION RATE: 16 BRPM

## 2021-11-04 DIAGNOSIS — D70.1 CHEMOTHERAPY-INDUCED NEUTROPENIA (H): Primary | ICD-10-CM

## 2021-11-04 DIAGNOSIS — T45.1X5A CHEMOTHERAPY-INDUCED NEUTROPENIA (H): Primary | ICD-10-CM

## 2021-11-04 DIAGNOSIS — C49.9 SPINDLE CELL SARCOMA (H): ICD-10-CM

## 2021-11-04 DIAGNOSIS — C45.9 MESOTHELIOMA, MALIGNANT (H): ICD-10-CM

## 2021-11-04 PROCEDURE — 250N000011 HC RX IP 250 OP 636: Performed by: INTERNAL MEDICINE

## 2021-11-04 PROCEDURE — 96372 THER/PROPH/DIAG INJ SC/IM: CPT | Performed by: INTERNAL MEDICINE

## 2021-11-04 RX ADMIN — PEGFILGRASTIM 6 MG: 6 INJECTION SUBCUTANEOUS at 13:52

## 2021-11-04 NOTE — PROGRESS NOTES
Infusion Nursing Note:  Ifrah Huitron presents today for Neulasta.    Patient seen by provider today: No   present during visit today: Not Applicable.    Note: Pt presents for Neulasta after receiving in pt. Chemotherapy. Questions answered about Neulasta and pt education was also provided in AVS.      Intravenous Access:  N/A.    Treatment Conditions:  Not Applicable.      Post Infusion Assessment:  Patient tolerated injection without incident.       Discharge Plan:   Discharge instructions reviewed with: Patient.  Patient and/or family verbalized understanding of discharge instructions and all questions answered.  Copy of AVS reviewed with patient and/or family.  Patient discharged in stable condition accompanied by: self.  Departure Mode: Ambulatory.      Sarahi Astudillo RN

## 2021-11-04 NOTE — PATIENT INSTRUCTIONS
Patient Education     Neulasta Prefilled Syringe 6 mg/0.6 mL  Uses  This medicine is used for the following purposes:    blood cell disorder    chemotherapy complications  Instructions  This medicine is used by injecting it into the skin. Please ask your doctor, nurse or pharmacist for the correct places on your body where this medicine can be injected.  Always inspect the medicine before using.  The liquid should be clear.  The liquid should be clear and colorless.  Do not use the medicine if it contains any particles or if it has changed color.  Check the medicine before each use. If the liquid medicine has any particles in it, appears discolored, or if the vial appears damaged, do not use it.  Do not shake the medicine before using.  If medicine is frozen, place it in the refrigerator to thaw, then leave it at room temperature for 30 minutes before injecting. If it is frozen a second time, do not use it.  Protect medicine from light.  Take the medicine out of the refrigerator about 30 minutes before use to warm to room temperature.  Injecting cold drug may be uncomfortable.  Discard unused medicine after 48 hours at room temperature.  Never use any medicine that has .  Please ask your doctor, nurse, or pharmacist how to discard unused medicines safely.  Ask your doctor, nurse or pharmacist to show you how to use this medicine correctly.  You or a family member can be trained to give this medicine at home.  Change the location of the injection each time. Choose a location at least 1 inch from the last injection.  This medicine will make you urinate more. If you have difficulty passing urine, please tell your doctor.  Tell your doctor if you have ever had an allergic reaction to latex.  If you forget to take a dose on time, take it as soon as you remember. If it is almost time for the next dose, do not take the missed dose. Return to your normal dosing schedule. Do not take 2 doses of this medicine at one  time.  Please tell your doctor and pharmacist about all the medicines you take. Include both prescription and over-the-counter medicines. Also tell them about any vitamins, herbal medicines, or anything else you take for your health.  Do not suddenly stop taking this medicine. Check with your doctor before stopping.  It is very important that you continue using this medicine for the full number of days that it is prescribed. Please do not stop the medicine even if you start to feel better after the first few days.  It is very important that you follow your doctor's instructions for all blood tests.  It is very important that you keep all appointments for medical exams and tests while on this medicine.  Cautions  Tell your doctor and pharmacist if you ever had an allergic reaction to a medicine. Symptoms of an allergic reaction can include trouble breathing, skin rash, itching, swelling, or severe dizziness.  This medicine is associated with a rare but very serious medical condition. Please speak with your doctor about symptoms you should look out for while on this medicine. Notify your doctor immediately if you develop those symptoms.  Some patients taking this medicine have experienced serious side effects. Please speak with your doctor to understand the risks and benefits associated with this medicine.  This medicine can cause the spleen to become larger or burst. Contact your doctor right away if you have pain in the upper left stomach or lower left shoulder area.  This medicine can cause a sickle cell crisis in patients with Sickle Cell Disease. Contact your doctor right away if you have widespread pain or trouble breathing.  Do not use the medication any more than instructed.  Watch for unusual or excessive bruising. Contact your doctor if you notice any.  Tell the doctor or pharmacist if you are pregnant, planning to be pregnant, or breastfeeding.  Ask your pharmacist if this medicine can interact with any of  your other medicines. Be sure to tell them about all the medicines you take.  Please tell all your doctors and dentists that you are on this medicine before they provide care.  Do not start or stop any other medicines without first speaking to your doctor or pharmacist.  Used needles and syringes should be thrown away properly in a medical waste container. Ask your doctor or pharmacist if you need help.  Do not share this medicine with anyone who has not been prescribed this medicine.  Side Effects  The following is a list of some common side effects from this medicine. Please speak with your doctor about what you should do if you experience these or other side effects.    bone pain    reaction at the area of the injection (pain, redness, swelling)  Call your doctor or get medical help right away if you notice any of these more serious side effects:    pain in the abdomen    back pain    bleeding that is severe or takes longer to stop    unusual bruising or discoloration on skin    feeling of swelling of the body    fever or chills    high fever    flu-like symptoms    shortness of breath    stomach pain    unusual or unexplained tiredness or weakness    difficulty or discomfort urinating    urinating less often    blood in urine  A few people may have an allergic reactions to this medicine. Symptoms can include difficulty breathing, skin rash, itching, swelling, or severe dizziness. If you notice any of these symptoms, seek medical help quickly.  Extra  Please speak with your doctor, nurse, or pharmacist if you have any questions about this medicine.  [email or web address]  IMPORTANT NOTE: This document tells you briefly how to take your medicine, but it does not tell you all there is to know about it.Your doctor or pharmacist may give you other documents about your medicine. Please talk to them if you have any questions.Always follow their advice. There is a more complete description of  this medicine available in English.Scan this code on your smartphone or tablet or use the web address below. You can also ask your pharmacist for a printout. If you have any questions, please ask your pharmacist.     2021 [employer].

## 2021-11-08 ENCOUNTER — HOSPITAL ENCOUNTER (EMERGENCY)
Facility: CLINIC | Age: 71
Discharge: HOME | End: 2021-11-08
Attending: FAMILY MEDICINE | Admitting: FAMILY MEDICINE
Payer: MEDICARE

## 2021-11-08 VITALS
DIASTOLIC BLOOD PRESSURE: 79 MMHG | HEART RATE: 85 BPM | SYSTOLIC BLOOD PRESSURE: 126 MMHG | RESPIRATION RATE: 27 BRPM | TEMPERATURE: 98.3 F | OXYGEN SATURATION: 97 %

## 2021-11-08 DIAGNOSIS — D70.1 CHEMOTHERAPY-INDUCED NEUTROPENIA (H): ICD-10-CM

## 2021-11-08 DIAGNOSIS — T45.1X5A CHEMOTHERAPY-INDUCED NEUTROPENIA (H): ICD-10-CM

## 2021-11-08 DIAGNOSIS — K05.10 GINGIVOSTOMATITIS: ICD-10-CM

## 2021-11-08 LAB
ALBUMIN SERPL-MCNC: 3 G/DL (ref 3.4–5)
ALP SERPL-CCNC: 216 U/L (ref 40–150)
ALT SERPL W P-5'-P-CCNC: 37 U/L (ref 0–70)
ANION GAP SERPL CALCULATED.3IONS-SCNC: 7 MMOL/L (ref 3–14)
AST SERPL W P-5'-P-CCNC: 16 U/L (ref 0–45)
BASOPHILS # BLD MANUAL: 0 10E3/UL (ref 0–0.2)
BASOPHILS NFR BLD MANUAL: 2 %
BILIRUB SERPL-MCNC: 0.4 MG/DL (ref 0.2–1.3)
BUN SERPL-MCNC: 17 MG/DL (ref 7–30)
CALCIUM SERPL-MCNC: 8.9 MG/DL (ref 8.5–10.1)
CHLORIDE BLD-SCNC: 110 MMOL/L (ref 94–109)
CO2 SERPL-SCNC: 23 MMOL/L (ref 20–32)
CREAT SERPL-MCNC: 0.71 MG/DL (ref 0.66–1.25)
CRP SERPL-MCNC: 26.9 MG/L (ref 0–8)
EOSINOPHIL # BLD MANUAL: 0 10E3/UL (ref 0–0.7)
EOSINOPHIL NFR BLD MANUAL: 0 %
ERYTHROCYTE [DISTWIDTH] IN BLOOD BY AUTOMATED COUNT: 12.9 % (ref 10–15)
GFR SERPL CREATININE-BSD FRML MDRD: >90 ML/MIN/1.73M2
GLUCOSE BLD-MCNC: 92 MG/DL (ref 70–99)
HCT VFR BLD AUTO: 37 % (ref 40–53)
HGB BLD-MCNC: 12 G/DL (ref 13.3–17.7)
HOLD SPECIMEN: NORMAL
LACTATE SERPL-SCNC: 0.8 MMOL/L (ref 0.7–2)
LYMPHOCYTES # BLD MANUAL: 0.7 10E3/UL (ref 0.8–5.3)
LYMPHOCYTES NFR BLD MANUAL: 46 %
MAGNESIUM SERPL-MCNC: 2.6 MG/DL (ref 1.6–2.3)
MCH RBC QN AUTO: 27.5 PG (ref 26.5–33)
MCHC RBC AUTO-ENTMCNC: 32.4 G/DL (ref 31.5–36.5)
MCV RBC AUTO: 85 FL (ref 78–100)
MONOCYTES # BLD MANUAL: 0.2 10E3/UL (ref 0–1.3)
MONOCYTES NFR BLD MANUAL: 16 %
NEUTROPHILS # BLD MANUAL: 0.5 10E3/UL (ref 1.6–8.3)
NEUTROPHILS NFR BLD MANUAL: 36 %
PHOSPHATE SERPL-MCNC: 1.9 MG/DL (ref 2.5–4.5)
PLAT MORPH BLD: ABNORMAL
PLATELET # BLD AUTO: 150 10E3/UL (ref 150–450)
POTASSIUM BLD-SCNC: 3.9 MMOL/L (ref 3.4–5.3)
PROT SERPL-MCNC: 6.7 G/DL (ref 6.8–8.8)
RBC # BLD AUTO: 4.37 10E6/UL (ref 4.4–5.9)
RBC MORPH BLD: ABNORMAL
SODIUM SERPL-SCNC: 140 MMOL/L (ref 133–144)
VARIANT LYMPHS BLD QL SMEAR: PRESENT
WBC # BLD AUTO: 1.5 10E3/UL (ref 4–11)

## 2021-11-08 PROCEDURE — 84100 ASSAY OF PHOSPHORUS: CPT | Performed by: FAMILY MEDICINE

## 2021-11-08 PROCEDURE — 250N000013 HC RX MED GY IP 250 OP 250 PS 637: Performed by: FAMILY MEDICINE

## 2021-11-08 PROCEDURE — 99283 EMERGENCY DEPT VISIT LOW MDM: CPT | Mod: 25

## 2021-11-08 PROCEDURE — 80053 COMPREHEN METABOLIC PANEL: CPT | Performed by: FAMILY MEDICINE

## 2021-11-08 PROCEDURE — 85049 AUTOMATED PLATELET COUNT: CPT | Performed by: FAMILY MEDICINE

## 2021-11-08 PROCEDURE — 99282 EMERGENCY DEPT VISIT SF MDM: CPT | Performed by: FAMILY MEDICINE

## 2021-11-08 PROCEDURE — 86140 C-REACTIVE PROTEIN: CPT | Performed by: FAMILY MEDICINE

## 2021-11-08 PROCEDURE — 250N000009 HC RX 250: Performed by: FAMILY MEDICINE

## 2021-11-08 PROCEDURE — 36415 COLL VENOUS BLD VENIPUNCTURE: CPT | Performed by: FAMILY MEDICINE

## 2021-11-08 PROCEDURE — 96360 HYDRATION IV INFUSION INIT: CPT

## 2021-11-08 PROCEDURE — 83605 ASSAY OF LACTIC ACID: CPT | Performed by: FAMILY MEDICINE

## 2021-11-08 PROCEDURE — 258N000003 HC RX IP 258 OP 636: Performed by: FAMILY MEDICINE

## 2021-11-08 PROCEDURE — 96361 HYDRATE IV INFUSION ADD-ON: CPT

## 2021-11-08 PROCEDURE — 83735 ASSAY OF MAGNESIUM: CPT | Performed by: FAMILY MEDICINE

## 2021-11-08 RX ORDER — LEVOFLOXACIN 500 MG/1
500 TABLET, FILM COATED ORAL DAILY
Qty: 7 TABLET | Refills: 0 | Status: SHIPPED | OUTPATIENT
Start: 2021-11-08 | End: 2021-11-15

## 2021-11-08 RX ORDER — LIDOCAINE HYDROCHLORIDE 20 MG/ML
10 SOLUTION OROPHARYNGEAL ONCE
Status: COMPLETED | OUTPATIENT
Start: 2021-11-08 | End: 2021-11-08

## 2021-11-08 RX ORDER — TRIAMCINOLONE ACETONIDE 0.1 %
PASTE (GRAM) DENTAL 2 TIMES DAILY
Qty: 5 G | Refills: 1 | Status: SHIPPED | OUTPATIENT
Start: 2021-11-08 | End: 2021-11-18

## 2021-11-08 RX ORDER — CLINDAMYCIN HCL 300 MG
300 CAPSULE ORAL 4 TIMES DAILY
Qty: 40 CAPSULE | Refills: 0 | Status: SHIPPED | OUTPATIENT
Start: 2021-11-08 | End: 2021-11-15

## 2021-11-08 RX ORDER — TRIAMCINOLONE ACETONIDE 0.1 %
PASTE (GRAM) DENTAL 2 TIMES DAILY
Status: DISCONTINUED | OUTPATIENT
Start: 2021-11-08 | End: 2021-11-08 | Stop reason: HOSPADM

## 2021-11-08 RX ORDER — LIDOCAINE HYDROCHLORIDE 20 MG/ML
15 SOLUTION OROPHARYNGEAL
Qty: 200 ML | Refills: 0 | Status: SHIPPED | OUTPATIENT
Start: 2021-11-08 | End: 2021-11-23

## 2021-11-08 RX ORDER — SODIUM CHLORIDE 9 MG/ML
INJECTION, SOLUTION INTRAVENOUS CONTINUOUS
Status: DISCONTINUED | OUTPATIENT
Start: 2021-11-08 | End: 2021-11-08 | Stop reason: HOSPADM

## 2021-11-08 RX ADMIN — SODIUM CHLORIDE 1000 ML: 9 INJECTION, SOLUTION INTRAVENOUS at 12:04

## 2021-11-08 RX ADMIN — TRIAMCINOLONE ACETONIDE: 1 PASTE TOPICAL at 11:52

## 2021-11-08 RX ADMIN — LIDOCAINE HYDROCHLORIDE 10 ML: 20 SOLUTION ORAL; TOPICAL at 12:05

## 2021-11-08 NOTE — DISCHARGE INSTRUCTIONS
Levaquin 500 mg p.o. daily x7 days.  Clindamycin 300 mg p.o. 4 times daily x7 days.  Lidocaine 2% viscous solution as we discussed every 3 hours swish and swallow for moderate pain in the mouth and throat areas.  Kenalog and Orabase applied twice daily to affected mucosal surfaces for protection and relief of discomfort.  Repeat CBC Wednesday/Thursday with results to your care team.  Return to the emergency department if worse or changes.

## 2021-11-09 ENCOUNTER — PATIENT OUTREACH (OUTPATIENT)
Dept: NURSING | Facility: CLINIC | Age: 71
End: 2021-11-09
Payer: MEDICARE

## 2021-11-09 DIAGNOSIS — K05.10 GINGIVOSTOMATITIS: ICD-10-CM

## 2021-11-09 DIAGNOSIS — M62.81 GENERALIZED MUSCLE WEAKNESS: Primary | ICD-10-CM

## 2021-11-09 ASSESSMENT — ACTIVITIES OF DAILY LIVING (ADL): DEPENDENT_IADLS:: INDEPENDENT

## 2021-11-09 NOTE — PROGRESS NOTES
Clinic Care Coordination Contact    Clinic Care Coordination Contact  OUTREACH    Referral Information:  Referral Source: IP Report    Primary Diagnosis: Oncology    Chief Complaint   Patient presents with     Clinic Care Coordination - [facility]     Clinic Care Coordination RN          Universal Utilization:    11/8/2021 ED visit Gingivostomatitis Chemotherapy induced and weakness     Clinic Utilization  Difficulty keeping appointments:: No  Compliance Concerns: No  No-Show Concerns: No  No PCP office visit in Past Year: No  Utilization    Hospital Admissions  7             ED Visits  2             No Show Count (past year)  0                Current as of: 11/8/2021  6:42 PM              Clinical Concerns:  Current Medical Concerns:  Patient reports he continues to feel weak.  Patient had IV fluids yesterday in the ED and felt a little stronger .  Swish and swallow medication has relieved the sores in his mouth    Current Behavioral Concerns: No    Education Provided to patient: CC available for future questions or concerns    Pain  Pain (GOAL):: No  Health Maintenance Reviewed: Due/Overdue   Health Maintenance Due   Topic Date Due     URINE DRUG SCREEN  Never done     ADVANCE CARE PLANNING  Never done     Pneumococcal Vaccine: 65+ Years (1 of 4 - PCV13) Never done     HEPATITIS C SCREENING  Never done     LIPID  Never done     ZOSTER IMMUNIZATION (1 of 2) Never done     MEDICARE ANNUAL WELLNESS VISIT  Never done     DTAP/TDAP/TD IMMUNIZATION (2 - Td or Tdap) 11/30/2016     COVID-19 Vaccine (3 - Moderna risk 4-dose series) 05/13/2021     INFLUENZA VACCINE (1) Never done     Clinical Pathway: None    Medication Management:  Medication review status: Medications reviewed.  Changes noted per patient report.       Functional Status:  Dependent ADLs:: Independent  Dependent IADLs:: Independent  Bed or wheelchair confined:: No  Mobility Status: Independent    Living Situation:  Current living arrangement:: I live in  a private home with spouse    Lifestyle & Psychosocial Needs:    Social Determinants of Health     Tobacco Use: Low Risk      Smoking Tobacco Use: Never Smoker     Smokeless Tobacco Use: Never Used   Alcohol Use: Not on file   Financial Resource Strain: Low Risk      Difficulty of Paying Living Expenses: Not very hard   Food Insecurity: No Food Insecurity     Worried About Running Out of Food in the Last Year: Never true     Ran Out of Food in the Last Year: Never true   Transportation Needs: No Transportation Needs     Lack of Transportation (Medical): No     Lack of Transportation (Non-Medical): No   Physical Activity: Inactive     Days of Exercise per Week: 0 days     Minutes of Exercise per Session: 0 min   Stress: Not on file   Social Connections: Moderately Integrated     Frequency of Communication with Friends and Family: Twice a week     Frequency of Social Gatherings with Friends and Family: Twice a week     Attends Roman Catholic Services: Never     Active Member of Clubs or Organizations: Yes     Attends Club or Organization Meetings: 1 to 4 times per year     Marital Status:    Intimate Partner Violence: Unknown     Fear of Current or Ex-Partner: No     Emotionally Abused: No     Physically Abused: Not on file     Sexually Abused: No   Depression: Not at risk     PHQ-2 Score: 0   Housing Stability: Not on file     Diet:: Regular  Inadequate nutrition (GOAL):: No  Tube Feeding: No  Inadequate activity/exercise (GOAL):: No  Significant changes in sleep pattern (GOAL): No  Transportation means:: Family     Roman Catholic or spiritual beliefs that impact treatment:: No  Mental health DX:: No  Mental health management concern (GOAL):: No  Chemical Dependency Status: No Current Concerns  Informal Support system:: Spouse          Resources and Interventions:  Current Resources:      Community Resources: OP Infusion  Supplies used at home:: None  Equipment Currently Used at Home: none  Employment Status: employed  "full-time     Advance Care Plan/Directive  Advanced Care Plans/Directives on file:: No    Referrals Placed: None     Goals:   Goals        General     Medical (pt-stated)      Notes - Note edited  11/3/2021  2:23 PM by Oliva García RN     Goal Statement: I want to \"whip\" this cancer   Date Goal set: 11/3/2021  Barriers: None identified   Strengths: Motivated and great family support   Date to Achieve By: 2/3/2022  Patient expressed understanding of goal: Yes  Action steps to achieve this goal:  1. I will keep my future  Primary care provider,ENT,Endocrinology and Oncology appointments   2. I will take my medications as directed               Patient/Caregiver understanding: Patient expresses good understanding of discharge instructions     Outreach Frequency: 2 weeks  Future Appointments              In 2 days CL LAB [facility] [place] [facility], [place]    In 2 days Jamir Grant MD [facility] Endocrinology, FLWY    In 1 week CL LAB [facility] Laboratory, [place]    In 1 week [place] LAB [facility] Laboratory, [place]    In 1 week [facility]    In 2 weeks Maynor Rios PA [facility] Cancer [facility] YEISON    In 3 weeks Fabiola Armstrong PA-C [facility]    In 1 month Provider, Arnaldo Ent Dysphonia Slp [facility] Voice Clinic [facility]    In 1 month Sherry Esquivel MD [facility] Ear Nose and Throat Clinic [facility]    In 1 month Maynor Rios PA [facility] Cancer [facility] RID          Plan:   Patient will finish course of antibiotics and uses swish and swallow/past medications for open mouth sores   Patient will keep Endocrine appointment 11/11/2021  CC RN will follow up in 1-2 weeks     [facility]   Oliva García RN, Care Coordinator   [facility] " [facility]'s   E-mail [email or web address]   [phone]

## 2021-11-10 DIAGNOSIS — G89.3 CANCER ASSOCIATED PAIN: ICD-10-CM

## 2021-11-10 RX ORDER — CELECOXIB 200 MG/1
200 CAPSULE ORAL 2 TIMES DAILY
Qty: 60 CAPSULE | Refills: 3 | Status: ON HOLD | OUTPATIENT
Start: 2021-11-10 | End: 2022-02-08

## 2021-11-10 NOTE — TELEPHONE ENCOUNTER
Received mychart request from pharmacy for refill of celebrex.     Last refill: 7/8/2021  Last office visit: 8/26/2021  Message sent to  for follow up apt     Will route request to MD for review.

## 2021-11-11 ENCOUNTER — OFFICE VISIT (OUTPATIENT)
Dept: ENDOCRINOLOGY | Facility: CLINIC | Age: 71
End: 2021-11-11
Attending: PHYSICIAN ASSISTANT
Payer: MEDICARE

## 2021-11-11 ENCOUNTER — LAB (OUTPATIENT)
Dept: LAB | Facility: CLINIC | Age: 71
End: 2021-11-11
Payer: MEDICARE

## 2021-11-11 VITALS
OXYGEN SATURATION: 97 % | HEART RATE: 88 BPM | BODY MASS INDEX: 24.17 KG/M2 | RESPIRATION RATE: 16 BRPM | DIASTOLIC BLOOD PRESSURE: 83 MMHG | SYSTOLIC BLOOD PRESSURE: 131 MMHG | HEIGHT: 67 IN | WEIGHT: 154 LBS

## 2021-11-11 DIAGNOSIS — D70.1 CHEMOTHERAPY-INDUCED NEUTROPENIA (H): ICD-10-CM

## 2021-11-11 DIAGNOSIS — C49.9 SPINDLE CELL SARCOMA (H): ICD-10-CM

## 2021-11-11 DIAGNOSIS — E03.4 HYPOTHYROIDISM DUE TO ACQUIRED ATROPHY OF THYROID: ICD-10-CM

## 2021-11-11 DIAGNOSIS — E27.40 ADRENAL INSUFFICIENCY (H): ICD-10-CM

## 2021-11-11 DIAGNOSIS — T45.1X5A CHEMOTHERAPY-INDUCED NEUTROPENIA (H): ICD-10-CM

## 2021-11-11 DIAGNOSIS — E83.39 HYPOPHOSPHATEMIA: ICD-10-CM

## 2021-11-11 DIAGNOSIS — E23.0 HYPOPITUITARISM (H): Primary | ICD-10-CM

## 2021-11-11 LAB
ALBUMIN SERPL-MCNC: 3.3 G/DL (ref 3.4–5)
ALP SERPL-CCNC: 249 U/L (ref 40–150)
ALT SERPL W P-5'-P-CCNC: 31 U/L (ref 0–70)
ANION GAP SERPL CALCULATED.3IONS-SCNC: 5 MMOL/L (ref 3–14)
AST SERPL W P-5'-P-CCNC: 20 U/L (ref 0–45)
BILIRUB SERPL-MCNC: 0.4 MG/DL (ref 0.2–1.3)
BUN SERPL-MCNC: 14 MG/DL (ref 7–30)
CALCIUM SERPL-MCNC: 9.1 MG/DL (ref 8.5–10.1)
CHLORIDE BLD-SCNC: 107 MMOL/L (ref 94–109)
CO2 SERPL-SCNC: 28 MMOL/L (ref 20–32)
CREAT SERPL-MCNC: 0.78 MG/DL (ref 0.66–1.25)
ERYTHROCYTE [DISTWIDTH] IN BLOOD BY AUTOMATED COUNT: 13.4 % (ref 10–15)
GFR SERPL CREATININE-BSD FRML MDRD: 90 ML/MIN/1.73M2
GLUCOSE BLD-MCNC: 76 MG/DL (ref 70–99)
HCT VFR BLD AUTO: 37.8 % (ref 40–53)
HGB BLD-MCNC: 12.4 G/DL (ref 13.3–17.7)
MAGNESIUM SERPL-MCNC: 2.4 MG/DL (ref 1.6–2.3)
MCH RBC QN AUTO: 27.2 PG (ref 26.5–33)
MCHC RBC AUTO-ENTMCNC: 32.8 G/DL (ref 31.5–36.5)
MCV RBC AUTO: 83 FL (ref 78–100)
PHOSPHATE SERPL-MCNC: 2.1 MG/DL (ref 2.5–4.5)
PLATELET # BLD AUTO: 167 10E3/UL (ref 150–450)
POTASSIUM BLD-SCNC: 3.5 MMOL/L (ref 3.4–5.3)
PROT SERPL-MCNC: 7 G/DL (ref 6.8–8.8)
RBC # BLD AUTO: 4.56 10E6/UL (ref 4.4–5.9)
SODIUM SERPL-SCNC: 140 MMOL/L (ref 133–144)
WBC # BLD AUTO: 10.7 10E3/UL (ref 4–11)

## 2021-11-11 PROCEDURE — 85027 COMPLETE CBC AUTOMATED: CPT

## 2021-11-11 PROCEDURE — 99204 OFFICE O/P NEW MOD 45 MIN: CPT | Performed by: INTERNAL MEDICINE

## 2021-11-11 PROCEDURE — 36415 COLL VENOUS BLD VENIPUNCTURE: CPT

## 2021-11-11 PROCEDURE — 84100 ASSAY OF PHOSPHORUS: CPT

## 2021-11-11 PROCEDURE — 83735 ASSAY OF MAGNESIUM: CPT

## 2021-11-11 PROCEDURE — 80053 COMPREHEN METABOLIC PANEL: CPT

## 2021-11-11 RX ORDER — HYDROCORTISONE SODIUM SUCCINATE 100 MG/2ML
100 INJECTION, POWDER, FOR SOLUTION INTRAMUSCULAR; INTRAVENOUS
Qty: 2 ML | Refills: 11 | Status: SHIPPED | OUTPATIENT
Start: 2021-11-11 | End: 2021-11-23

## 2021-11-11 ASSESSMENT — PAIN SCALES - GENERAL: PAINLEVEL: NO PAIN (0)

## 2021-11-11 ASSESSMENT — MIFFLIN-ST. JEOR: SCORE: 1402.17

## 2021-11-11 NOTE — PROGRESS NOTES
"CC: Hypopituitarism.     HPI: Patient presents for management of hypopituitarism.  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     Pituitary adenoma history: 2010.  04/19/2010 pathology report.  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 Thyroid started 4/10   hypophysectomy 8/23/10.  Presumptive Pituitary deficiency: TSH, ACTH, with prescription since hypophysectomy.  (4-15-10): fT4 0.7 (no prescription), igf-1 54, michael 11     Prescription:  Hydrocortisone: 20.10 (note: Patient did not tolerate lower dosing)  Synthroid 0.075     (2-20-14): testosterone 356, fT4 1.20, igf1 47, prolactin 7.7, Sodium 143, K 4.0  (6-5-15): Sodium 143, k 3.7, igf1 49, testosterone 321     Plan: continuation of empiric prescription for AI and hypothyroidism.   Of note: typical hydrocortisone daily dose of 15-25 is planned.  Monitor additional pituitary function (testosterone) every yr.   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     He takes 75 mcg levothyroxine every day, hydrocortisone 20 mg AM and 10 mg afternoon.   He has not been instructed on sick day dosing. Just takes an extra tablet when he is not feeling well. Specifically, low energy.     On 11/8/21, diagnosed with gingivostomatitis. Eating less since. He has been mainly eating Ramen noodles.  One vocal fold does not close fully. Notes if he talks a lot, he cannot hold air in well and becomes lightheaded.   He is having soft stools but not diarrhea.       ROS: 10 point ROS neg other than the symptoms noted above in the HPI.    PMH:   Patient Active Problem List   Diagnosis     Calculus of kidney     Degeneration of lumbar or lumbosacral intervertebral disc     Eczema " "    Epidural lipomatosis     TUYET (generalized anxiety disorder)     Hypopituitarism (H)     Hypothyroidism     Osteoarthritis of spine with radiculopathy, lumbar region     Pituitary macroadenoma (H)     Rosacea     Chronic lower back pain     Spindle cell sarcoma (H)     Mesothelioma, malignant (H)     Vocal fold paralysis, left     Dysphonia     Muscle tension dysphonia     Mediastinal lymphadenopathy     Inguinal hernia     Chemotherapy-induced nausea     Hypophosphatemia     Anemia     Chemotherapy-induced neutropenia (H)      Meds:  Current Outpatient Medications   Medication     acetaminophen (TYLENOL) 500 MG tablet     calcium carbonate (TUMS) 500 MG chewable tablet     celecoxib (CELEBREX) 200 MG capsule     cholecalciferol (VITAMIN D-1000 MAX ST) 1000 units TABS     clindamycin (CLEOCIN) 300 MG capsule     guaiFENesin-codeine (GUAIFENESIN AC) 100-10 MG/5ML syrup     hydrocortisone (CORTEF) 10 MG tablet     levofloxacin (LEVAQUIN) 500 MG tablet     levothyroxine (SYNTHROID/LEVOTHROID) 75 MCG tablet     lidocaine (XYLOCAINE) 2 % solution     LORazepam (ATIVAN) 0.5 MG tablet     Menthol-Methyl Salicylate (DALIA MALIK GREASELESS) cream     metroNIDAZOLE (METROGEL) 0.75 % external gel     omeprazole (PRILOSEC) 20 MG DR capsule     ondansetron (ZOFRAN) 4 MG tablet     potassium & sodium phosphates (NEUTRA-PHOS) 280-160-250 MG Packet     prochlorperazine (COMPAZINE) 5 MG tablet     rosuvastatin (CRESTOR) 40 MG tablet     triamcinolone (KENALOG) 0.1 % external cream     triamcinolone (KENALOG) 0.1 % paste     No current facility-administered medications for this visit.      FHX:   No pituitary disease.     SHX:  Non-smoker.   Used to work as a real estate.     Exam:   Vital signs:      BP: 131/83 Pulse: 88   Resp: 16 SpO2: 97 %     Height: 170.2 cm (5' 7\") Weight: 69.9 kg (154 lb)  Estimated body mass index is 24.12 kg/m  as calculated from the following:    Height as of this encounter: 1.702 m (5' 7\").    Weight as of " this encounter: 69.9 kg (154 lb).  Gen: In NAD.   HEENT: no proptosis or lid lag, EOMI, no palpable thyroid tissue.  Card: S1 S2 RRR no m/r/g. no LE edema.   Pulm: CTA b/l.   GI: NT ND +BS.   MSK: no gross deformities.   Derm: no rashes or lesions.   Neuro: no tremor, +2 DTR's.     A/P:   Hypopituitarism - 2/2 hypophesectomy (8-23-10) for nonfunctioning 2.3 cm pituitary adenoma.  Mass effect- MRI 9/29/21: The sella region appears stable in the interval.   Repeat imaging if clinical picture changes.   Water - Recent normal Na.   Cortisol - On hydrocortisone. Sick day dosing reviewed. Reviewed risks of excessive HC use.   -Continue hydrocortisone at its current dose.   -Double dose of hydrocortisone for 3 days during times of illness. Be sure to stay hydrated. If you are not improving, seek medical attention.   -Rx for emergency solu-cortef 100 mg intramuscular injection. To be taken if you cannot keep your pills down. Take immediately and then proceed to the ER.   -I recommend you get a medical alert bracelet that states ADRENAL INSUFFICIENCY.  Thyroid -Normal Free T4 in 9/2021.    Continue levothyroxine 75 mcg every day.   Gonads - normal testosterone in 9/2020. Repeat PRN.   Growth hormone - Will not check as treatment during an active malignancy is contraindicated.   Prolactin - normal in 2020. Repeat PRN.         Jamir Garnt M.D

## 2021-11-11 NOTE — PATIENT INSTRUCTIONS
-Continue hydrocortisone at its current dose.   -Double dose of hydrocortisone for 3 days during times of illness. Be sure to stay hydrated. If you are not improving, seek medical attention.   -Rx for emergency solu-cortef 100 mg intramuscular injection. To be taken if you cannot keep your pills down. Take immediately and then proceed to the ER.   -I recommend you get a medical alert bracelet that states ADRENAL INSUFFICIENCY.    -Labs and see me in 6 months.

## 2021-11-11 NOTE — LETTER
"    11/11/2021         RE: Ifrah Huitron  [address] [place] [address]        Dear Colleague,    Thank you for referring your patient, Ifrah Huitron, to the [facility] ENDOCRINOLOGY. Please see a copy of my visit note below.    CC: Hypopituitarism.     HPI: Patient presents for management of hypopituitarism.  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     Pituitary adenoma history: 2010.  04/19/2010 pathology report.  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 Thyroid started 4/10   hypophysectomy 8/23/10.  Presumptive Pituitary deficiency: TSH, ACTH, with prescription since hypophysectomy.  (4-15-10): fT4 0.7 (no prescription), igf-1 54, michael 11     Prescription:  Hydrocortisone: 20.10 (note: Patient did not tolerate lower dosing)  Synthroid 0.075     (2-20-14): testosterone 356, fT4 1.20, igf1 47, prolactin 7.7, Sodium 143, K 4.0  (6-5-15): Sodium 143, k 3.7, igf1 49, testosterone 321     Plan: continuation of empiric prescription for AI and hypothyroidism.   Of note: typical hydrocortisone daily dose of 15-25 is planned.  Monitor additional pituitary function (testosterone) every yr.   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     He takes 75 mcg levothyroxine every day, hydrocortisone 20 mg AM and 10 mg afternoon.   He has not been instructed on sick day dosing. Just takes an extra tablet when he is not feeling well. Specifically, low energy.     On 11/8/21, diagnosed with gingivostomatitis. Eating less since. He has been mainly eating Ramen noodles.  One vocal fold does not close fully. Notes if he talks a lot, he cannot hold air in well and becomes lightheaded.   He is " having soft stools but not diarrhea.       ROS: 10 point ROS neg other than the symptoms noted above in the HPI.    PMH:   Patient Active Problem List   Diagnosis     Calculus of kidney     Degeneration of lumbar or lumbosacral intervertebral disc     Eczema     Epidural lipomatosis     TUYET (generalized anxiety disorder)     Hypopituitarism (H)     Hypothyroidism     Osteoarthritis of spine with radiculopathy, lumbar region     Pituitary macroadenoma (H)     Rosacea     Chronic lower back pain     Spindle cell sarcoma (H)     Mesothelioma, malignant (H)     Vocal fold paralysis, left     Dysphonia     Muscle tension dysphonia     Mediastinal lymphadenopathy     Inguinal hernia     Chemotherapy-induced nausea     Hypophosphatemia     Anemia     Chemotherapy-induced neutropenia (H)      Meds:  Current Outpatient Medications   Medication     acetaminophen (TYLENOL) 500 MG tablet     calcium carbonate (TUMS) 500 MG chewable tablet     celecoxib (CELEBREX) 200 MG capsule     cholecalciferol (VITAMIN D-1000 MAX ST) 1000 units TABS     clindamycin (CLEOCIN) 300 MG capsule     guaiFENesin-codeine (GUAIFENESIN AC) 100-10 MG/5ML syrup     hydrocortisone (CORTEF) 10 MG tablet     levofloxacin (LEVAQUIN) 500 MG tablet     levothyroxine (SYNTHROID/LEVOTHROID) 75 MCG tablet     lidocaine (XYLOCAINE) 2 % solution     LORazepam (ATIVAN) 0.5 MG tablet     Menthol-Methyl Salicylate (DALIA MALIK GREASELESS) cream     metroNIDAZOLE (METROGEL) 0.75 % external gel     omeprazole (PRILOSEC) 20 MG DR capsule     ondansetron (ZOFRAN) 4 MG tablet     potassium & sodium phosphates (NEUTRA-PHOS) 280-160-250 MG Packet     prochlorperazine (COMPAZINE) 5 MG tablet     rosuvastatin (CRESTOR) 40 MG tablet     triamcinolone (KENALOG) 0.1 % external cream     triamcinolone (KENALOG) 0.1 % paste     No current facility-administered medications for this visit.      FHX:   No pituitary disease.     SHX:  Non-smoker.   Used to work as a real estate.  "    Exam:   Vital signs:      BP: 131/83 Pulse: 88   Resp: 16 SpO2: 97 %     Height: 170.2 cm (5' 7\") Weight: 69.9 kg (154 lb)  Estimated body mass index is 24.12 kg/m  as calculated from the following:    Height as of this encounter: 1.702 m (5' 7\").    Weight as of this encounter: 69.9 kg (154 lb).  Gen: In NAD.   HEENT: no proptosis or lid lag, EOMI, no palpable thyroid tissue.  Card: S1 S2 RRR no m/r/g. no LE edema.   Pulm: CTA b/l.   GI: NT ND +BS.   MSK: no gross deformities.   Derm: no rashes or lesions.   Neuro: no tremor, +2 DTR's.     A/P:   Hypopituitarism - 2/2 hypophesectomy (8-23-10) for nonfunctioning 2.3 cm pituitary adenoma.  Mass effect- MRI 9/29/21: The sella region appears stable in the interval.   Repeat imaging if clinical picture changes.   Water - Recent normal Na.   Cortisol - On hydrocortisone. Sick day dosing reviewed. Reviewed risks of excessive HC use.   -Continue hydrocortisone at its current dose.   -Double dose of hydrocortisone for 3 days during times of illness. Be sure to stay hydrated. If you are not improving, seek medical attention.   -Rx for emergency solu-cortef 100 mg intramuscular injection. To be taken if you cannot keep your pills down. Take immediately and then proceed to the ER.   -I recommend you get a medical alert bracelet that states ADRENAL INSUFFICIENCY.  Thyroid -Normal Free T4 in 9/2021.    Continue levothyroxine 75 mcg every day.   Gonads - normal testosterone in 9/2020. Repeat PRN.   Growth hormone - Will not check as treatment during an active malignancy is contraindicated.   Prolactin - normal in 2020. Repeat PRN.         Jamir Grant M.D          Again, thank you for allowing me to participate in the care of your patient.        Sincerely,        Jamir Grant MD    "

## 2021-11-12 ENCOUNTER — TELEPHONE (OUTPATIENT)
Dept: FAMILY MEDICINE | Facility: CLINIC | Age: 71
End: 2021-11-12
Payer: MEDICARE

## 2021-11-12 ENCOUNTER — TELEPHONE (OUTPATIENT)
Dept: ONCOLOGY | Facility: CLINIC | Age: 71
End: 2021-11-12
Payer: MEDICARE

## 2021-11-12 ENCOUNTER — TELEPHONE (OUTPATIENT)
Dept: ENDOCRINOLOGY | Facility: CLINIC | Age: 71
End: 2021-11-12

## 2021-11-12 ENCOUNTER — HOSPITAL ENCOUNTER (EMERGENCY)
Facility: CLINIC | Age: 71
Discharge: HOME | End: 2021-11-12
Attending: EMERGENCY MEDICINE | Admitting: EMERGENCY MEDICINE
Payer: MEDICARE

## 2021-11-12 VITALS
HEART RATE: 97 BPM | TEMPERATURE: 97.8 F | OXYGEN SATURATION: 98 % | HEIGHT: 67 IN | RESPIRATION RATE: 20 BRPM | WEIGHT: 150 LBS | SYSTOLIC BLOOD PRESSURE: 141 MMHG | DIASTOLIC BLOOD PRESSURE: 83 MMHG | BODY MASS INDEX: 23.54 KG/M2

## 2021-11-12 DIAGNOSIS — K05.10 GINGIVOSTOMATITIS: ICD-10-CM

## 2021-11-12 LAB
ANION GAP SERPL CALCULATED.3IONS-SCNC: 8 MMOL/L (ref 3–14)
BASOPHILS # BLD MANUAL: 0 10E3/UL (ref 0–0.2)
BASOPHILS NFR BLD MANUAL: 0 %
BUN SERPL-MCNC: 12 MG/DL (ref 7–30)
CALCIUM SERPL-MCNC: 9 MG/DL (ref 8.5–10.1)
CHLORIDE BLD-SCNC: 109 MMOL/L (ref 94–109)
CO2 SERPL-SCNC: 24 MMOL/L (ref 20–32)
CREAT SERPL-MCNC: 0.7 MG/DL (ref 0.66–1.25)
EOSINOPHIL # BLD MANUAL: 0 10E3/UL (ref 0–0.7)
EOSINOPHIL NFR BLD MANUAL: 0 %
ERYTHROCYTE [DISTWIDTH] IN BLOOD BY AUTOMATED COUNT: 13 % (ref 10–15)
GFR SERPL CREATININE-BSD FRML MDRD: >90 ML/MIN/1.73M2
GLUCOSE BLD-MCNC: 105 MG/DL (ref 70–99)
HCT VFR BLD AUTO: 36.5 % (ref 40–53)
HGB BLD-MCNC: 11.7 G/DL (ref 13.3–17.7)
LYMPHOCYTES # BLD MANUAL: 2 10E3/UL (ref 0.8–5.3)
LYMPHOCYTES NFR BLD MANUAL: 16 %
MCH RBC QN AUTO: 27 PG (ref 26.5–33)
MCHC RBC AUTO-ENTMCNC: 32.1 G/DL (ref 31.5–36.5)
MCV RBC AUTO: 84 FL (ref 78–100)
METAMYELOCYTES # BLD MANUAL: 0.5 10E3/UL
METAMYELOCYTES NFR BLD MANUAL: 4 %
MONOCYTES # BLD MANUAL: 1 10E3/UL (ref 0–1.3)
MONOCYTES NFR BLD MANUAL: 8 %
MYELOCYTES # BLD MANUAL: 0.1 10E3/UL
MYELOCYTES NFR BLD MANUAL: 1 %
NEUTROPHILS # BLD MANUAL: 8.9 10E3/UL (ref 1.6–8.3)
NEUTROPHILS NFR BLD MANUAL: 71 %
PLAT MORPH BLD: ABNORMAL
PLATELET # BLD AUTO: 188 10E3/UL (ref 150–450)
POTASSIUM BLD-SCNC: 3.7 MMOL/L (ref 3.4–5.3)
RBC # BLD AUTO: 4.34 10E6/UL (ref 4.4–5.9)
RBC MORPH BLD: ABNORMAL
SODIUM SERPL-SCNC: 141 MMOL/L (ref 133–144)
VARIANT LYMPHS BLD QL SMEAR: PRESENT
WBC # BLD AUTO: 12.5 10E3/UL (ref 4–11)

## 2021-11-12 PROCEDURE — 99284 EMERGENCY DEPT VISIT MOD MDM: CPT | Performed by: EMERGENCY MEDICINE

## 2021-11-12 PROCEDURE — 36415 COLL VENOUS BLD VENIPUNCTURE: CPT | Performed by: EMERGENCY MEDICINE

## 2021-11-12 PROCEDURE — 87529 HSV DNA AMP PROBE: CPT | Performed by: EMERGENCY MEDICINE

## 2021-11-12 PROCEDURE — 85027 COMPLETE CBC AUTOMATED: CPT | Performed by: EMERGENCY MEDICINE

## 2021-11-12 PROCEDURE — 80048 BASIC METABOLIC PNL TOTAL CA: CPT | Performed by: EMERGENCY MEDICINE

## 2021-11-12 RX ORDER — DIPHENHYDRAMINE HYDROCHLORIDE AND LIDOCAINE HYDROCHLORIDE AND ALUMINUM HYDROXIDE AND MAGNESIUM HYDRO
5-10 KIT EVERY 6 HOURS PRN
Qty: 119 ML | Refills: 0 | Status: SHIPPED | OUTPATIENT
Start: 2021-11-12 | End: 2021-11-23

## 2021-11-12 RX ORDER — FLUCONAZOLE 200 MG/1
400 TABLET ORAL DAILY
Qty: 7 TABLET | Refills: 0 | Status: SHIPPED | OUTPATIENT
Start: 2021-11-12 | End: 2021-11-23

## 2021-11-12 RX ORDER — NYSTATIN 100000/ML
500000 SUSPENSION, ORAL (FINAL DOSE FORM) ORAL 4 TIMES DAILY
Qty: 60 ML | Refills: 0 | Status: SHIPPED | OUTPATIENT
Start: 2021-11-12 | End: 2021-11-15

## 2021-11-12 ASSESSMENT — MIFFLIN-ST. JEOR: SCORE: 1384.03

## 2021-11-12 NOTE — TELEPHONE ENCOUNTER
Prior Authorization Approval    Authorization Effective Date: 11/12/2021  Authorization Expiration Date: 12/31/2021  Medication: SOLU-CORTEF 100 MG injection- APPROVED  Approved Dose/Quantity: 2 ML  Reference #: [id number]   Insurance Company: [employer] - Phone [phone] Fax [phone]  Expected CoPay:       CoPay Card Available:      Foundation Assistance Needed:    Which Pharmacy is filling the prescription (Not needed for infusion/clinic administered): [facility] [id number] [facility]  Pharmacy Notified: Yes, pharmacy has a paid claim  Patient Notified:Pharmacy will notify patient when ready          Central Prior Authorization Team  Phone: [phone]

## 2021-11-12 NOTE — TELEPHONE ENCOUNTER
Patient calling with white spots in his mouth and on tongue.     They are now bleeding.     Having a hard time eating. Trying to push fluids, but is hard.     Not getting any better since the ED visit, slightly worsening even an all the prescribed medications and antibiotics.     No vomiting, he is on anti-nausea medication.     [facility]  and spoke with her in regards to patient.    Advised patient return back to ED and she would notify patient's Oncologist provider to inform him that he is going. He is having his wife drive him to [place] ED.     PETER Nguyen/[place] [facility]

## 2021-11-12 NOTE — TELEPHONE ENCOUNTER
[facility] fax from:  [facility] [place], [place]  PH:  [phone]    hydrocortisone sodium succinate PF (SOLU-CORTEF) 100 MG injection    Pharmacy comments:  A covered alternative may be available.  If clinically appropriate, you may change the prescription. If you would like to complete a prior authorization, please visit [email or web address].    Arcenio Weiss  [facility]

## 2021-11-12 NOTE — ED TRIAGE NOTES
Pt c/o increased mouth, and tongue pain .  pt was seen recently here on 11/8/21 and given antibiotics. Pt currently on Chemo for Lung  Ca.

## 2021-11-12 NOTE — ED PROVIDER NOTES
History   No chief complaint on file.    HPI  Ifrah Huitron is a 71 year old male with history significant for chemotherapy induced neutropenia, pituitary macroadenoma s/p resection, metastatic spindle cell sarcoma (v mesothelioma), currently being treated with Doxil + ifosfamide, who presents to the Emergency Department with painful oral lesions. Was evaluated in this department on 11/08/2021 and treated with clindamycin and ciprofloxacin per oncology recommendations in addition to triamcinolone paste.  Describes red painful lesions through entire mouth.  Symptomatically improved after above treatment.  2 days ago started to have increased pain in his mouth and wife noticed white spots in the back of his mouth and today is all over his mouth and tongue.  No recent medication changes.  No fevers or chills.  No sore throat.  Pain in mouth makes eating difficult but is able to eat soups and tolerate liquids okay.  No current vomiting or diarrhea.  No chest pain or shortness of breath.  Main concern is painful lesions in his mouth.    The patient's PMHx, Surgical Hx, Allergies, and Medications were all reviewed with the patient.    Allergies:  Allergies   Allergen Reactions     Penicillins Hives and Swelling              Problem List:    Patient Active Problem List    Diagnosis Date Noted     Chemotherapy-induced neutropenia (H) 11/04/2021     Priority: Medium     Chemotherapy-induced nausea 11/02/2021     Priority: Medium     Hypophosphatemia 11/02/2021     Priority: Medium     Anemia 11/02/2021     Priority: Medium     Vocal fold paralysis, left 06/21/2021     Priority: Medium     Added automatically from request for surgery [id number]       Dysphonia 06/21/2021     Priority: Medium     Added automatically from request for surgery [id number]       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     Priority: Medium     Added automatically from request for surgery [id number]       Mediastinal lymphadenopathy 06/21/2021     Priority:  Medium     Added automatically from request for surgery [id number]       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     Priority: Medium     Mesothelioma, 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 Thyroid started 4/10  Low cortisol [1] 5/7/10 so secondary adrenal insufficiency  hypophysectomy 8/23/10    Formatting of this note might be different from the original.  1.9 cm  Formatting of this note might be different from the original.  Macroadenoma of 1.9 cm in largest dimension noted 4/10  Likely central thyroid and HGH deficiency. Thyroid started 4/10  Low cortisol [1] 5/7/10 so secondary adrenal insufficiency  hypophysectomy 8/23/10       Eczema 01/12/2009     Priority: Medium     Calculus of kidney 09/09/2004     Priority: Medium     Rosacea 09/09/2004     Priority: Medium     Inguinal hernia 09/09/2004     Priority: Medium     Formatting of this note might be different from the original.  Epic          Past Medical History:    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     Thyroid disease        Past Surgical History:    Past Surgical History:   Procedure Laterality Date     COLONOSCOPY N/A 12/17/2020    Procedure: COLONOSCOPY;  Surgeon: Ken Camacho MD;  Location: [facility]     ENT  "SURGERY       HERNIA REPAIR       LARYNGOSCOPY, EXCISE VOCAL CORD LESION MICROSCOPIC, COMBINED Left 7/1/2021    Procedure: MICROLARYNGOSCOPY, LEFT TRUE VOCAL CORD INJECTION WITH PROLARYN;  Surgeon: Kyree Bearden MD;  Location: [facility]     PHACOEMULSIFICATION WITH STANDARD INTRAOCULAR LENS IMPLANT Right 3/10/2021    Procedure: Cataract removal with implant.;  Surgeon: Jamir Mac MD;  Location: [facility]     PHACOEMULSIFICATION WITH STANDARD INTRAOCULAR LENS IMPLANT Left 4/5/2021    Procedure: Cataract removal with implant.;  Surgeon: Jamir Mac MD;  Location: [facility]     PITUITARY EXCISION       tooth pulled 4/7  Right        Family History:    Family History   Problem Relation Age of Onset     Lupus Mother      ALS Father      Rheumatoid Arthritis Sister        Social History:  Marital Status:   [2]  Social History     Tobacco Use     Smoking status: Never Smoker     Smokeless tobacco: Never Used   Substance Use Topics     Alcohol use: Yes     Comment: rare     Drug use: Never        Medications:    fluconazole (DIFLUCAN) 200 MG tablet  magic mouthwash suspension, diphenhydrAMINE, lidocaine, aluminum-magnesium & simethicone, (FIRST-MOUTHWASH BLM) compounding kit  nystatin (MYCOSTATIN) 678190 UNIT/ML suspension  acetaminophen (TYLENOL) 500 MG tablet  calcium carbonate (TUMS) 500 MG chewable tablet  celecoxib (CELEBREX) 200 MG capsule  cholecalciferol (VITAMIN D-1000 MAX ST) 1000 units TABS  clindamycin (CLEOCIN) 300 MG capsule  guaiFENesin-codeine (GUAIFENESIN AC) 100-10 MG/5ML syrup  hydrocortisone (CORTEF) 10 MG tablet  hydrocortisone sodium succinate PF (SOLU-CORTEF) 100 MG injection  Insulin Syringe-Needle U-100 27.5G X 5/8\" 2 ML MISC  levofloxacin (LEVAQUIN) 500 MG tablet  levothyroxine (SYNTHROID/LEVOTHROID) 75 MCG tablet  lidocaine (XYLOCAINE) 2 % solution  LORazepam (ATIVAN) 0.5 MG tablet  Menthol-Methyl Salicylate (DALIA MALIK GREASELESS) cream  metroNIDAZOLE (METROGEL) 0.75 % " "external gel  omeprazole (PRILOSEC) 20 MG DR capsule  ondansetron (ZOFRAN) 4 MG tablet  potassium & sodium phosphates (NEUTRA-PHOS) 280-160-250 MG Packet  prochlorperazine (COMPAZINE) 5 MG tablet  rosuvastatin (CRESTOR) 40 MG tablet  triamcinolone (KENALOG) 0.1 % external cream  triamcinolone (KENALOG) 0.1 % paste          Review of Systems   A complete review of systems performed and is otherwise negative except as noted in the HPI.     Physical Exam   BP: (!) 141/83  Pulse: 97  Temp: 97.8  F (36.6  C)  Resp: 20  Height: 170.2 cm (5' 7\")  Weight: 68 kg (150 lb)  SpO2: 98 %    Physical Exam  GEN: Awake, alert, and cooperative.  Appears distressed.  HENT: Painful white lesions covering tongue, buccal mucosa and anterior lower lip.  Lesions do not easily scrape away.  No edema or erythema of posterior oropharynx or palate. Nasal septum appears friable, no active bleeding.   EYES: EOM intact. Conjunctiva clear. No discharge.   NECK: Symmetric, freely mobile.   CV : extremities warm and well perfused.   PULM: Normal effort. Speaking in full sentences.   ABD: Soft, non-tender, non-distended. No rebound or guarding.   NEURO: Normal speech. Following commands. CN II-XII grossly intact. Answering questions and interacting appropriately.   EXT: No gross deformity.   INT: Warm. No diaphoresis. Normal color. See HENT above       ED Course        Procedures          Critical Care time:  none               Results for orders placed or performed during the hospital encounter of 11/12/21 (from the past 24 hour(s))   CBC with platelets differential    Narrative    The following orders were created for panel order CBC with platelets differential.  Procedure                               Abnormality         Status                     ---------                               -----------         ------                     CBC with platelets and d...[[id number]]  Abnormal            Final result               Manual " Differential[[id number]]          Abnormal            Final result                 Please view results for these tests on the individual orders.   Basic metabolic panel   Result Value Ref Range    Sodium 141 133 - 144 mmol/L    Potassium 3.7 3.4 - 5.3 mmol/L    Chloride 109 94 - 109 mmol/L    Carbon Dioxide (CO2) 24 20 - 32 mmol/L    Anion Gap 8 3 - 14 mmol/L    Urea Nitrogen 12 7 - 30 mg/dL    Creatinine 0.70 0.66 - 1.25 mg/dL    Calcium 9.0 8.5 - 10.1 mg/dL    Glucose 105 (H) 70 - 99 mg/dL    GFR Estimate >90 >60 mL/min/1.73m2   CBC with platelets and differential   Result Value Ref Range    WBC Count 12.5 (H) 4.0 - 11.0 10e3/uL    RBC Count 4.34 (L) 4.40 - 5.90 10e6/uL    Hemoglobin 11.7 (L) 13.3 - 17.7 g/dL    Hematocrit 36.5 (L) 40.0 - 53.0 %    MCV 84 78 - 100 fL    MCH 27.0 26.5 - 33.0 pg    MCHC 32.1 31.5 - 36.5 g/dL    RDW 13.0 10.0 - 15.0 %    Platelet Count 188 150 - 450 10e3/uL   Manual Differential   Result Value Ref Range    % Neutrophils 71 %    % Lymphocytes 16 %    % Monocytes 8 %    % Eosinophils 0 %    % Basophils 0 %    % Metamyelocytes 4 %    % Myelocytes 1 %    Absolute Neutrophils 8.9 (H) 1.6 - 8.3 10e3/uL    Absolute Lymphocytes 2.0 0.8 - 5.3 10e3/uL    Absolute Monocytes 1.0 0.0 - 1.3 10e3/uL    Absolute Eosinophils 0.0 0.0 - 0.7 10e3/uL    Absolute Basophils 0.0 0.0 - 0.2 10e3/uL    Absolute Metamyelocytes 0.5 (H) <=0.0 10e3/uL    Absolute Myelocytes 0.1 (H) <=0.0 10e3/uL    RBC Morphology Confirmed RBC Indices     Platelet Assessment  Automated Count Confirmed. Platelet morphology is normal.     Automated Count Confirmed. Platelet morphology is normal.    Reactive Lymphocytes Present (A) None Seen       Medications - No data to display    Assessments & Plan (with Medical Decision Making)   71 year old male with past medical history significant of metastatic spindle cell sarcoma v mesothelioma, chemotherapeutic neutropenia, painful red oral lesions recently treated with triamcinolone,  clindamycin, and ciprofloxacin now with two days of white colored oral lesions.  Given recent antibiotic use, triamcinolone, neutropenia, concern for oral candidiasis.  However pain of lesions would not be typical.  Also considering HSV.  Viral swab pending.  I discussed the case with Dr. Smith with oncology who recommended oral nystatin.  Also a wait-and-see approach with oral fluconazole for 1 week.  Written prescription given for this and patient instructed to start taking only if symptoms fail to improve in 2 days.  Continue clindamycin and ciprofloxacin previously prescribed.  Basic labs obtained and neutropenia from 1 week ago has resolved, now with mild leukocytosis of 12.5 with left shift.  No fever or chills or other constitutional symptoms.  Difficult to interpret as patient has been using topical oral steroid for symptomatic control over the past week.  BMP grossly normal.  Magic mouthwash to help with eating.  Patient is taking liquids and soft foods, soups.  Feels he is maintaining adequate nutrition and does not feel dehydrated.  Overall, patient is well-appearing and I feel appropriate for discharge.  Will need to follow-up with primary care team or oncology to follow-up on HSV results.  ED return cautions discussed.    I have reviewed the nursing notes.         New Prescriptions    FLUCONAZOLE (DIFLUCAN) 200 MG TABLET    Take 2 tablets (400 mg) by mouth daily    MAGIC MOUTHWASH SUSPENSION, DIPHENHYDRAMINE, LIDOCAINE, ALUMINUM-MAGNESIUM & SIMETHICONE, (FIRST-MOUTHWASH BLM) COMPOUNDING KIT    Swish and swallow 5-10 mLs in mouth every 6 hours as needed for mouth sores    NYSTATIN (MYCOSTATIN) 289658 UNIT/ML SUSPENSION    Take 5 mLs (500,000 Units) by mouth 4 times daily       Final diagnoses:   Gingivostomatitis - viral vs fungal     Juan Camacho MD    11/12/2021   [facility] EMERGENCY DEPT    Disclaimer: This note consists of words and symbols derived from keyboarding and  dictation using voice recognition software.  As a result, there may be errors that have gone undetected.  Please consider this when interpreting information found in this note.             Juan Camacho MD  11/12/21 [id number]

## 2021-11-12 NOTE — ED NOTES
White patches on touch and inside both cheeks.  Patient was in on Monday and given antibiotics for mouth infection.  Patient stated that he was starting to feel better until yesterday when he developed the white patches and increased pain.  Patient is currently on chemo for lung cancer and has infusions x1 monthly.

## 2021-11-12 NOTE — TELEPHONE ENCOUNTER
PA Initiation    Medication: hydrocortisone sodium succinate PF (SOLU-CORTEF) 100 MG injection  Insurance Company: [employer] - Phone [phone] Fax [phone]  Pharmacy Filling the Rx: [facility] [place]  [id number] [facility]  Filling Pharmacy Phone: [phone]  Filling Pharmacy Fax: [phone]  Start Date: 11/12/2021

## 2021-11-12 NOTE — TELEPHONE ENCOUNTER
"S: Mouth Sores    B: Call received from Jacque Triage RN from primary care clinic, but not pt's PCP clinic.    Pt was in ED on 11/8/21 for weakness, lightheadedness, and painful mouth and nose lesions x 2-3 days following chemotherapy infusion on 11/4. Nose lesions were bleeding but controllable. Not eating or drinking well. Was afebrile in ED. ED Exam: Nose: Very friable appearing septal nasal mucosa, small areas of clot and also petechial hemorrhage are present. There are no ulcerated lesions. Mouth: Diffuse patchy erythema some petechial hemorrhages, no ulcers.  Provider spoke with Dr. Longoria who advised topical measures and placing pt on antibiotics. Pt prescribed Cleocin and Levaquin. Given Triamcinolone to take twice daily x 10 days. Pt was able to tolerate water, coffee and soup and was discharged home.     Today, pt is calling PCP triage and reports no improvement. Trying to push fluids but it is very difficult. Has not been able to eat today. Feels like \"stomach is in a knot.\" Cream is not helping.     A: Triage nurse would advise sending pt to the ED. Agree with this plan d/t decreased fluid/food intake, pain, and no improvement x 4 days.    R:Routed to Dr. Wolff and Care team.      "

## 2021-11-13 LAB
HSV1 DNA SPEC QL NAA+PROBE: NOT DETECTED
HSV2 DNA SPEC QL NAA+PROBE: NOT DETECTED

## 2021-11-13 NOTE — DISCHARGE INSTRUCTIONS
Apply magic mouthwash prior to eating. This should help reduce pain temporarily with goal to help you eat. Apply nystatin to sores on your mouth as directed. If your symptoms do not improve by Sunday then stated taking oral fluconazole. You do not need to refill triamcinolone.     Follow up with your primary care provider this next week. Testing for HSV (virus) results are pending and will not likely be completed for 1-2 days.     If your symptoms worsen or you develop new or concerning symptoms, please return to the Emergency Department for further evaluation and treatment.

## 2021-11-15 ENCOUNTER — MYC MEDICAL ADVICE (OUTPATIENT)
Dept: ONCOLOGY | Facility: CLINIC | Age: 71
End: 2021-11-15

## 2021-11-15 ENCOUNTER — PATIENT OUTREACH (OUTPATIENT)
Dept: NURSING | Facility: CLINIC | Age: 71
End: 2021-11-15
Payer: MEDICARE

## 2021-11-15 DIAGNOSIS — B37.0 THRUSH: Primary | ICD-10-CM

## 2021-11-15 DIAGNOSIS — K05.10 GINGIVOSTOMATITIS: Primary | ICD-10-CM

## 2021-11-15 RX ORDER — NYSTATIN 100000/ML
500000 SUSPENSION, ORAL (FINAL DOSE FORM) ORAL 4 TIMES DAILY
Qty: 60 ML | Refills: 0 | Status: SHIPPED | OUTPATIENT
Start: 2021-11-15 | End: 2021-11-23

## 2021-11-15 ASSESSMENT — ACTIVITIES OF DAILY LIVING (ADL): DEPENDENT_IADLS:: INDEPENDENT

## 2021-11-15 NOTE — PROGRESS NOTES
Clinic Care Coordination Contact    Clinic Care Coordination Contact  OUTREACH    Referral Information:  Referral Source: ED Follow-Up    Primary Diagnosis: Oncology    Chief Complaint   Patient presents with     Clinic Care Coordination - Post Hospital     Clinic Care Coordination RN          Universal Utilization: ED visit 11/12/2021 Gingivostomatitis      Clinic Utilization  Difficulty keeping appointments:: No  Compliance Concerns: No  No-Show Concerns: No  No PCP office visit in Past Year: No  Utilization    Hospital Admissions  7             ED Visits  3             No Show Count (past year)  0                Current as of: 11/15/2021  1:34 PM              Clinical Concerns:  Current Medical Concerns:    Patient continues to have white spots on tongue.  Complains of burning  Patient was instructed to start diflucan if not any better by Sunday Wife just picked up Diflucan and will take 2 tablet now and finish the course of 3.5 days     Current Behavioral Concerns: No   Education Provided to patient: informed patient of the action of Diflucan    Pain  Pain (GOAL):: No  Health Maintenance Reviewed: Due/Overdue   Health Maintenance Due   Topic Date Due     URINE DRUG SCREEN  Never done     ADVANCE CARE PLANNING  Never done     Pneumococcal Vaccine: 65+ Years (1 of 4 - PCV13) Never done     HEPATITIS C SCREENING  Never done     LIPID  Never done     ZOSTER IMMUNIZATION (1 of 2) Never done     MEDICARE ANNUAL WELLNESS VISIT  Never done     DTAP/TDAP/TD IMMUNIZATION (2 - Td or Tdap) 11/30/2016     COVID-19 Vaccine (3 - Moderna risk 4-dose series) 05/13/2021     INFLUENZA VACCINE (1) Never done     Clinical Pathway: None    Medication Management:  Medication review status: Medications reviewed.  Changes noted per patient report.       Functional Status:  Dependent ADLs:: Independent  Dependent IADLs:: Independent  Bed or wheelchair confined:: No  Mobility Status: Independent    Living Situation:  Current living  arrangement:: I live in a private home with spouse    Lifestyle & Psychosocial Needs:    Social Determinants of Health     Tobacco Use: Low Risk      Smoking Tobacco Use: Never Smoker     Smokeless Tobacco Use: Never Used   Alcohol Use: Not on file   Financial Resource Strain: Low Risk      Difficulty of Paying Living Expenses: Not very hard   Food Insecurity: No Food Insecurity     Worried About Running Out of Food in the Last Year: Never true     Ran Out of Food in the Last Year: Never true   Transportation Needs: No Transportation Needs     Lack of Transportation (Medical): No     Lack of Transportation (Non-Medical): No   Physical Activity: Inactive     Days of Exercise per Week: 0 days     Minutes of Exercise per Session: 0 min   Stress: Not on file   Social Connections: Moderately Integrated     Frequency of Communication with Friends and Family: Twice a week     Frequency of Social Gatherings with Friends and Family: Twice a week     Attends Mandaeism Services: Never     Active Member of Clubs or Organizations: Yes     Attends Club or Organization Meetings: 1 to 4 times per year     Marital Status:    Intimate Partner Violence: Unknown     Fear of Current or Ex-Partner: No     Emotionally Abused: No     Physically Abused: Not on file     Sexually Abused: No   Depression: Not at risk     PHQ-2 Score: 0   Housing Stability: Not on file     Diet:: Regular  Inadequate nutrition (GOAL):: No  Tube Feeding: No  Inadequate activity/exercise (GOAL):: No  Significant changes in sleep pattern (GOAL): No  Transportation means:: Family     Mandaeism or spiritual beliefs that impact treatment:: No  Mental health DX:: No  Mental health management concern (GOAL):: No  Chemical Dependency Status: No Current Concerns  Informal Support system:: Spouse           Resources and Interventions:  Current Resources:      Community Resources: OP Infusion  Supplies used at home:: None  Equipment Currently Used at Home:  "none  Employment Status: employed full-time         Advance Care Plan/Directive  Advanced Care Plans/Directives on file:: No    Referrals Placed: None     Goals:   Goals        General     Medical (pt-stated)      Notes - Note edited  11/3/2021  2:23 PM by Oliva García RN     Goal Statement: I want to \"whip\" this cancer   Date Goal set: 11/3/2021  Barriers: None identified   Strengths: Motivated and great family support   Date to Achieve By: 2/3/2022  Patient expressed understanding of goal: Yes  Action steps to achieve this goal:  1. I will keep my future  Primary care provider,ENT,Endocrinology and Oncology appointments   2. I will take my medications as directed               Patient/Caregiver understanding: Patient expresses good understanding of discharge instructions     Outreach Frequency: weekly  Future Appointments              Today [facility]    In 3 days CL LAB [facility], [facility]    In 1 week [facility], [facility]    In 1 week [facility]    In 1 week Maynor Rios PA [facility] YEISON    In 2 weeks Fabiola Armstrong PA-C [facility]    In 3 weeks Provider, Arnaldo Ent Dysphonia Sl[facility] Clinic [facility]    In 3 weeks Sherry Esquivel MD [facility]    In 1 month Maynor Rios PA [facility] RID    In 6 months [facility], [facility]    In 6 months Jamir Grant MD [facility]          Plan:   1. Patient will finish course of Diflucan  2. CC RN will follow up in 3-5 business days    [facility]   Oliva García RN, Care Coordinator   [facility]   E-mail [email or web address]   [phone]

## 2021-11-15 NOTE — TELEPHONE ENCOUNTER
Thank you,  Hortensia Garduno - Pharmacy Technician  [facility]  [phone]

## 2021-11-16 NOTE — TELEPHONE ENCOUNTER
Per Maynor CHOW: can you let them know that we certainly will not do his next round of chemo if he isn't recovered yet. That is why there is a visit with me to assess if it is safe to do another round or if we need to wait/change therapies.     Glad his mouth is better.      Call placed to [place] and left message relaying above information.

## 2021-11-16 NOTE — TELEPHONE ENCOUNTER
They gave him another prescription that has done wonders for him for mouth pain. It has been a struggle over the last 2 weeks since he got sick. States his pain from the cancer is gone and he does not have to use [employer] anymore.   Feeling 100% better than he did yesterday, Eating some Ramen Noodles. Feels very encouraged that he is feeling better.   Gave Triage phone number to call if he does not feel as if he can do the chemotherapy in patient as he will be scheduled.   States he has been breathing through his mouth at night. Advised a cool mist humidifier to assist with humidity in room.   Would like to wait a few days to see how he feels before he will commit to doing any further in hospital treatment.     RNCC can you please reach out to patient regarding his next treatment. Hoping that next treatment will be after Thanksgiving.

## 2021-11-18 ENCOUNTER — LAB (OUTPATIENT)
Dept: LAB | Facility: CLINIC | Age: 71
End: 2021-11-18
Payer: MEDICARE

## 2021-11-18 ENCOUNTER — TELEPHONE (OUTPATIENT)
Dept: OTOLARYNGOLOGY | Facility: CLINIC | Age: 71
End: 2021-11-18

## 2021-11-18 DIAGNOSIS — E23.0 HYPOPITUITARISM (H): Primary | ICD-10-CM

## 2021-11-18 DIAGNOSIS — E27.40 ADRENAL INSUFFICIENCY (H): ICD-10-CM

## 2021-11-18 DIAGNOSIS — C49.9 SPINDLE CELL SARCOMA (H): ICD-10-CM

## 2021-11-18 DIAGNOSIS — C45.9 MESOTHELIOMA, MALIGNANT (H): ICD-10-CM

## 2021-11-18 LAB
ALBUMIN SERPL-MCNC: 3.4 G/DL (ref 3.4–5)
ALP SERPL-CCNC: 240 U/L (ref 40–150)
ALT SERPL W P-5'-P-CCNC: 34 U/L (ref 0–70)
ANION GAP SERPL CALCULATED.3IONS-SCNC: 3 MMOL/L (ref 3–14)
AST SERPL W P-5'-P-CCNC: 23 U/L (ref 0–45)
BASOPHILS # BLD AUTO: 0.2 10E3/UL (ref 0–0.2)
BASOPHILS NFR BLD AUTO: 1 %
BILIRUB SERPL-MCNC: 0.3 MG/DL (ref 0.2–1.3)
BUN SERPL-MCNC: 12 MG/DL (ref 7–30)
CALCIUM SERPL-MCNC: 9.1 MG/DL (ref 8.5–10.1)
CHLORIDE BLD-SCNC: 107 MMOL/L (ref 94–109)
CO2 SERPL-SCNC: 33 MMOL/L (ref 20–32)
CREAT SERPL-MCNC: 0.82 MG/DL (ref 0.66–1.25)
EOSINOPHIL # BLD AUTO: 0 10E3/UL (ref 0–0.7)
EOSINOPHIL NFR BLD AUTO: 0 %
ERYTHROCYTE [DISTWIDTH] IN BLOOD BY AUTOMATED COUNT: 14.2 % (ref 10–15)
GFR SERPL CREATININE-BSD FRML MDRD: 88 ML/MIN/1.73M2
GLUCOSE BLD-MCNC: 77 MG/DL (ref 70–99)
HCT VFR BLD AUTO: 40.5 % (ref 40–53)
HGB BLD-MCNC: 13.1 G/DL (ref 13.3–17.7)
IMM GRANULOCYTES # BLD: 0.5 10E3/UL
IMM GRANULOCYTES NFR BLD: 3 %
LYMPHOCYTES # BLD AUTO: 2.2 10E3/UL (ref 0.8–5.3)
LYMPHOCYTES NFR BLD AUTO: 16 %
MAGNESIUM SERPL-MCNC: 2.5 MG/DL (ref 1.6–2.3)
MCH RBC QN AUTO: 27.4 PG (ref 26.5–33)
MCHC RBC AUTO-ENTMCNC: 32.3 G/DL (ref 31.5–36.5)
MCV RBC AUTO: 85 FL (ref 78–100)
MONOCYTES # BLD AUTO: 1 10E3/UL (ref 0–1.3)
MONOCYTES NFR BLD AUTO: 7 %
NEUTROPHILS # BLD AUTO: 9.9 10E3/UL (ref 1.6–8.3)
NEUTROPHILS NFR BLD AUTO: 73 %
NRBC # BLD AUTO: 0 10E3/UL
NRBC BLD AUTO-RTO: 0 /100
PHOSPHATE SERPL-MCNC: 2.3 MG/DL (ref 2.5–4.5)
PLATELET # BLD AUTO: 255 10E3/UL (ref 150–450)
POTASSIUM BLD-SCNC: 3.3 MMOL/L (ref 3.4–5.3)
PROT SERPL-MCNC: 7.1 G/DL (ref 6.8–8.8)
RBC # BLD AUTO: 4.78 10E6/UL (ref 4.4–5.9)
SODIUM SERPL-SCNC: 143 MMOL/L (ref 133–144)
T4 FREE SERPL-MCNC: 1.46 NG/DL (ref 0.76–1.46)
WBC # BLD AUTO: 13.7 10E3/UL (ref 4–11)

## 2021-11-18 PROCEDURE — 85025 COMPLETE CBC W/AUTO DIFF WBC: CPT

## 2021-11-18 PROCEDURE — 36415 COLL VENOUS BLD VENIPUNCTURE: CPT

## 2021-11-18 PROCEDURE — 84100 ASSAY OF PHOSPHORUS: CPT

## 2021-11-18 PROCEDURE — 83735 ASSAY OF MAGNESIUM: CPT

## 2021-11-18 PROCEDURE — 80053 COMPREHEN METABOLIC PANEL: CPT

## 2021-11-18 PROCEDURE — 84439 ASSAY OF FREE THYROXINE: CPT

## 2021-11-18 NOTE — TELEPHONE ENCOUNTER
Writer called patient to change appointment with Dr. Esquivel 12/9/21 from 11:00 AM to 10:00 AM. Patient confirmed change.    Doug Omalley, EMT

## 2021-11-22 ENCOUNTER — LAB (OUTPATIENT)
Dept: LAB | Facility: CLINIC | Age: 71
End: 2021-11-22
Payer: MEDICARE

## 2021-11-22 ENCOUNTER — HOSPITAL ENCOUNTER (OUTPATIENT)
Dept: CT IMAGING | Facility: CLINIC | Age: 71
Discharge: HOME | End: 2021-11-22
Attending: INTERNAL MEDICINE | Admitting: INTERNAL MEDICINE
Payer: MEDICARE

## 2021-11-22 DIAGNOSIS — C49.9 SPINDLE CELL SARCOMA (H): ICD-10-CM

## 2021-11-22 DIAGNOSIS — C45.9 MESOTHELIOMA, MALIGNANT (H): ICD-10-CM

## 2021-11-22 DIAGNOSIS — E83.39 HYPOPHOSPHATEMIA: ICD-10-CM

## 2021-11-22 LAB
ALBUMIN SERPL-MCNC: 3.4 G/DL (ref 3.4–5)
ALP SERPL-CCNC: 335 U/L (ref 40–150)
ALT SERPL W P-5'-P-CCNC: 101 U/L (ref 0–70)
ANION GAP SERPL CALCULATED.3IONS-SCNC: 5 MMOL/L (ref 3–14)
AST SERPL W P-5'-P-CCNC: 36 U/L (ref 0–45)
BASOPHILS # BLD AUTO: 0.2 10E3/UL (ref 0–0.2)
BASOPHILS NFR BLD AUTO: 2 %
BILIRUB SERPL-MCNC: 0.4 MG/DL (ref 0.2–1.3)
BUN SERPL-MCNC: 13 MG/DL (ref 7–30)
CALCIUM SERPL-MCNC: 9.6 MG/DL (ref 8.5–10.1)
CHLORIDE BLD-SCNC: 106 MMOL/L (ref 94–109)
CO2 SERPL-SCNC: 31 MMOL/L (ref 20–32)
CREAT SERPL-MCNC: 0.9 MG/DL (ref 0.66–1.25)
EOSINOPHIL # BLD AUTO: 0 10E3/UL (ref 0–0.7)
EOSINOPHIL NFR BLD AUTO: 0 %
ERYTHROCYTE [DISTWIDTH] IN BLOOD BY AUTOMATED COUNT: 14.6 % (ref 10–15)
GFR SERPL CREATININE-BSD FRML MDRD: 84 ML/MIN/1.73M2
GLUCOSE BLD-MCNC: 99 MG/DL (ref 70–99)
HCT VFR BLD AUTO: 40.9 % (ref 40–53)
HGB BLD-MCNC: 13.2 G/DL (ref 13.3–17.7)
IMM GRANULOCYTES # BLD: 0.3 10E3/UL
IMM GRANULOCYTES NFR BLD: 2 %
LYMPHOCYTES # BLD AUTO: 2.5 10E3/UL (ref 0.8–5.3)
LYMPHOCYTES NFR BLD AUTO: 19 %
MAGNESIUM SERPL-MCNC: 2.4 MG/DL (ref 1.6–2.3)
MCH RBC QN AUTO: 27.6 PG (ref 26.5–33)
MCHC RBC AUTO-ENTMCNC: 32.3 G/DL (ref 31.5–36.5)
MCV RBC AUTO: 86 FL (ref 78–100)
MONOCYTES # BLD AUTO: 1.6 10E3/UL (ref 0–1.3)
MONOCYTES NFR BLD AUTO: 12 %
NEUTROPHILS # BLD AUTO: 8.3 10E3/UL (ref 1.6–8.3)
NEUTROPHILS NFR BLD AUTO: 65 %
NRBC # BLD AUTO: 0 10E3/UL
NRBC BLD AUTO-RTO: 0 /100
PHOSPHATE SERPL-MCNC: 1.9 MG/DL (ref 2.5–4.5)
PLATELET # BLD AUTO: 292 10E3/UL (ref 150–450)
POTASSIUM BLD-SCNC: 3.1 MMOL/L (ref 3.4–5.3)
PROT SERPL-MCNC: 7.2 G/DL (ref 6.8–8.8)
RBC # BLD AUTO: 4.78 10E6/UL (ref 4.4–5.9)
SODIUM SERPL-SCNC: 142 MMOL/L (ref 133–144)
WBC # BLD AUTO: 12.8 10E3/UL (ref 4–11)

## 2021-11-22 PROCEDURE — 71260 CT THORAX DX C+: CPT | Mod: MG

## 2021-11-22 PROCEDURE — 85025 COMPLETE CBC W/AUTO DIFF WBC: CPT

## 2021-11-22 PROCEDURE — 36415 COLL VENOUS BLD VENIPUNCTURE: CPT

## 2021-11-22 PROCEDURE — 80053 COMPREHEN METABOLIC PANEL: CPT

## 2021-11-22 PROCEDURE — 250N000011 HC RX IP 250 OP 636: Performed by: INTERNAL MEDICINE

## 2021-11-22 PROCEDURE — 84100 ASSAY OF PHOSPHORUS: CPT

## 2021-11-22 PROCEDURE — 250N000009 HC RX 250: Performed by: INTERNAL MEDICINE

## 2021-11-22 PROCEDURE — 83735 ASSAY OF MAGNESIUM: CPT

## 2021-11-22 RX ORDER — IOPAMIDOL 755 MG/ML
74 INJECTION, SOLUTION INTRAVASCULAR ONCE
Status: COMPLETED | OUTPATIENT
Start: 2021-11-22 | End: 2021-11-22

## 2021-11-22 RX ADMIN — IOPAMIDOL 74 ML: 755 INJECTION, SOLUTION INTRAVENOUS at 12:03

## 2021-11-22 RX ADMIN — SODIUM CHLORIDE 58 ML: 9 INJECTION, SOLUTION INTRAVENOUS at 12:03

## 2021-11-22 NOTE — PROGRESS NOTES
Ifrah is a 71 year old who is being evaluated via a billable video visit.      How would you like to obtain your AVS? MyChart  If the video visit is dropped, the invitation should be resent by: Text to cell phone: [phone]  Will anyone else be joining your video visit? Yes: wife, Abida. How would they like to receive their invitation? Text to cell phone: n/a       Video visit: 40 minutes.    Oncology/Hematology Visit Note  Nov 23, 2021     Reason for Visit: Follow up of spindle cell sarcoma     History of Present Illness: Ifrah Huitron is a 71 year old male with PMH rosacea, pituitary macroadenoma s/p resection, GERD, kidney stones, DJD with metastatic spindle cell sarcoma. He presented to his PCP March 2021 with chest pain/left shoulder and back pain that led to imaging which revealed multiple lung mets. There were difficulties in getting diagnostic biopsy, eventually biopsy of mediastinal mass with spindle cell sarcoma. There was high PD-L1 expression (90%). Baseline imaging 6/21/21 with progression of lung mets and left-sided subdiaphragmatic mass, stable liver lesions. 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      He started Keytruda 6/21/21. CT 8/2/21 with positive response to treatment. CT 10/18/21 with mixed response- LUQ mass larger, anterior mediastinal and left anterior pleural mass smaller. Keytruda stopped.    Started on Doxil + Ifosfamide 10/26/21.     Interval History:  Ifrah was called today for oncology follow-up. He states the last few weeks have been horrible. He developed significant mucositis to the point he was unable to eat anything and has lost weight. He then developed thrush which caused even more mouth irritation. He has completed fluconazole/nystatin course in addition to antibiotics and now his mouth is improved, though still not back to normal. He is able to eat oatmeal and Ramen noodles. Doing fine with  "hydration. He has MMW but infrequently uses it. He also has dry mouth.    He has had significant nausea with chemo though no vomiting. He has been taking Lorazepam and Compazine, both of which make him tired. He also has been doing tums for upset stomach which helps.    He has noted improvement in his cancer related pain. His shortness of breath is also improved.    He has had moments of depression and anxiety since this last treatment, noting he doesn't find enjoyment in things he used to enjoy. He was on an antidepressant in the past but didn't tolerate well and stopped after 2 days.     No fevers, headaches, neuro concerns, chest pain, cough, abdominal pain. Stools are soft but no diarrhea. No urinary issues. No edema or skin concerns.    He feels weak, stating he is not even at 50% of his baseline.     Current Outpatient Medications   Medication Sig Dispense Refill     acetaminophen (TYLENOL) 500 MG tablet Take 500-1,000 mg by mouth every 6 hours as needed for mild pain       calcium carbonate (TUMS) 500 MG chewable tablet Take 1 chew tab by mouth daily       celecoxib (CELEBREX) 200 MG capsule Take 1 capsule (200 mg) by mouth 2 times daily. Note this is a new a strength! Only one capsule at a time! 60 capsule 3     cholecalciferol (VITAMIN D-1000 MAX ST) 1000 units TABS Take 1,000 Units by mouth daily        fluconazole (DIFLUCAN) 200 MG tablet Take 2 tablets (400 mg) by mouth daily 7 tablet 0     guaiFENesin-codeine (GUAIFENESIN AC) 100-10 MG/5ML syrup Take 10 mLs by mouth every 4 hours as needed for cough 118 mL 0     hydrocortisone (CORTEF) 10 MG tablet Take 20 mg in the morning and 10 mg in the afternoon       hydrocortisone sodium succinate PF (SOLU-CORTEF) 100 MG injection Inject 2 mLs (100 mg) into the muscle once as needed (ADRENAL CRISIS) 2 mL 11     Insulin Syringe-Needle U-100 27.5G X 5/8\" 2 ML MISC 1 each daily as needed (with solucortef for adrenal crisis) 10 each 1     levothyroxine " (SYNTHROID/LEVOTHROID) 75 MCG tablet Take 75 mcg by mouth every morning        lidocaine (XYLOCAINE) 2 % solution Swish and swallow 15 mLs in mouth every 3 hours as needed for moderate pain ; Max 8 doses/24 hour period. 200 mL 0     LORazepam (ATIVAN) 0.5 MG tablet Take 1 tablet (0.5 mg) by mouth every 4 hours as needed for nausea or vomiting . Caution: causes sedation. 30 tablet 0     magic mouthwash suspension, diphenhydrAMINE, lidocaine, aluminum-magnesium & simethicone, (FIRST-MOUTHWASH BLM) compounding kit Swish and swallow 5-10 mLs in mouth every 6 hours as needed for mouth sores 119 mL 0     Menthol-Methyl Salicylate (DALIA MALIK GREASELESS) cream Apply topically every 6 hours as needed       metroNIDAZOLE (METROGEL) 0.75 % external gel Apply topically 2 times daily 45 g 11     nystatin (MYCOSTATIN) 952469 UNIT/ML suspension Take 5 mLs (500,000 Units) by mouth 4 times daily 60 mL 0     omeprazole (PRILOSEC) 20 MG DR capsule Take 20 mg by mouth       ondansetron (ZOFRAN) 4 MG tablet Take 1-2 tablets (4-8 mg) by mouth every 8 hours as needed for nausea 40 tablet 1     potassium & sodium phosphates (NEUTRA-PHOS) 280-160-250 MG Packet Take 1 packet by mouth 2 times daily 6 packet 0     prochlorperazine (COMPAZINE) 5 MG tablet Take 1 tablet (5 mg) by mouth every 6 hours as needed for nausea or vomiting . Caution: causes sedation. 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     Thyroid disease     removed pituitary gland     Past Surgical History:   Procedure Laterality Date     COLONOSCOPY N/A 12/17/2020    Procedure: COLONOSCOPY;  Surgeon: Ken Camacho MD;  Location: [facility]     ENT SURGERY       HERNIA REPAIR        LARYNGOSCOPY, EXCISE VOCAL CORD LESION MICROSCOPIC, COMBINED Left 7/1/2021    Procedure: MICROLARYNGOSCOPY, LEFT TRUE VOCAL CORD INJECTION WITH PROLARYN;  Surgeon: Kyree Bearden MD;  Location: [facility]     PHACOEMULSIFICATION WITH STANDARD INTRAOCULAR LENS IMPLANT Right 3/10/2021    Procedure: Cataract removal with implant.;  Surgeon: Jamir Mac MD;  Location: [facility]     PHACOEMULSIFICATION WITH STANDARD INTRAOCULAR LENS IMPLANT Left 4/5/2021    Procedure: Cataract removal with implant.;  Surgeon: Jamir Mac MD;  Location: [facility]     PITUITARY EXCISION       tooth pulled 4/7  Right      Allergies   Allergen Reactions     Penicillins Hives and Swelling            Social History   Social History     Tobacco Use     Smoking status: Never Smoker     Smokeless tobacco: Never Used   Substance Use Topics     Alcohol use: Yes     Comment: rare     Drug use: Never      Past medical history and social history were reviewed.    Physical Examination:  There were no vitals taken for this visit.  Wt Readings from Last 10 Encounters:   11/12/21 68 kg (150 lb)   11/11/21 69.9 kg (154 lb)   11/04/21 70 kg (154 lb 6.4 oz)   11/02/21 85.9 kg (189 lb 6.4 oz)   09/17/21 73 kg (161 lb)   09/01/21 72.6 kg (160 lb)   08/11/21 72.6 kg (160 lb)   08/02/21 72.8 kg (160 lb 9.6 oz)   07/12/21 73 kg (161 lb)   07/01/21 73 kg (161 lb)     Video physical exam  General: Patient appears well in no acute distress.   Skin: No visualized rash or lesions on visualized skin  Eyes: EOMI, no erythema, sclera icterus or discharge noted  Resp: Appears to be breathing comfortably without accessory muscle usage, speaking in full sentences, no cough  MSK: Appears to have normal range of motion based on visualized movements  Neurologic: No apparent tremors, facial movements symmetric  Psych: affect normal, alert and oriented    The rest of a comprehensive physical examination is deferred due to PHE (public health emergency) video  restrictions    Laboratory Data:  Results for MARISOL XIE (MRN [id number]) as of 11/24/2021 09:36   11/22/2021 11:18 11/22/2021 11:21   Sodium 142    Potassium 3.1 (L)    Chloride 106    Carbon Dioxide 31    Urea Nitrogen 13    Creatinine 0.90    GFR Estimate 84    Calcium 9.6    Anion Gap 5    Magnesium 2.4 (H)    Phosphorus 1.9 (L)    Albumin 3.4    Protein Total 7.2    Bilirubin Total 0.4    Alkaline Phosphatase 335 (H)     (H)    AST 36    Glucose 99    WBC  12.8 (H)   Hemoglobin  13.2 (L)   Hematocrit  40.9   Platelet Count  292   RBC Count  4.78   MCV  86   MCH  27.6   MCHC  32.3   RDW  14.6   % Neutrophils  65   % Lymphocytes  19   % Monocytes  12   % Eosinophils  0   % Basophils  2   Absolute Basophils  0.2   Absolute Eosinophils  0.0   Absolute Immature Granulocytes  0.3 (H)   Absolute Lymphocytes  2.5   Absolute Monocytes  1.6 (H)   % Immature Granulocytes  2   Absolute Neutrophils  8.3   Absolute NRBCs  0.0   NRBCs per 100 WBC  0     CT CAP 11/22/21  FINDINGS:     Chest: The previously seen left pleural effusion is smaller. Trace  left pleural fluid remains. The previously seen irregular shaped  anterior mediastinal mass is stable to slightly smaller compared to  the prior study. It measures 2.3 x 1.9 x 4.5 cm. Comparable  measurements on the prior study are 2.6 x 2.3 x 4.4 cm.  A nodule at  the anterior pericardial margin on axial image 35 is unchanged at 0.9  cm. Focal pleural thickening anterolaterally in the left hemithorax on  axial image 24 is stable.     Abdomen: A lobulated left upper quadrant mass intimately related to  the left hemidiaphragm and with suspected splenic invasion is again  seen. It has not significantly changed from the prior study. It  measures 7.7 x 5.8 x 4.7 cm. Comparable measurements on the prior  study are 7.4 x 5.8 x 4.6 cm. Subcentimeter liver lesions continue to  be stable.  Small cysts are seen in each kidney are unchanged.     Pelvis: No significant abnormality  is demonstrated.                                                                      IMPRESSION:  Overall stable neoplastic disease. No new findings.     BRANDIE BRANHAM MD     Assessment and Plan:  1. 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 High PD-L1. Was on Keytruda but had progression, now on Doxil + Ifos started 10/26/21.    Did not tolerate well-had significant mucositis, poor PO intake, nausea, and electrolyte derrangements. He is still very weak and at this time not well enough for cycle 2. Will delay by one week.     Plan to do video visit next week to see if he is improved enough for cycle 2. Will plan to reduce both Doxil and Ifos by 10% for cycle 2.     CT from yesterday does show stable disease and his pain is improved indicating positive response to this regimen.      2. GI  Dyspepsia: Increase Omeprazole back to 20mg BID (unclear why it was reduced). Continue Tums PRN    CINV: Discussed starting Zofran and can take scheduled if needed. Continue Compazine and Lorazepam PRN. Continue Emend inpatient on day 1 and day 6.      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 Cal Saba endocrine.     Hypothyroidism continue Synthroid 75mcg daily.      4. Derm  Rosacea: Long standing issue, continue Metrogel PRN. No new skin concerns.      5. MSK  Left shoulder/back/chest wall pain 2/2 tumor, following with palliative. Continue topical Bengay and PO Celebrex BID. Pain improved with new regimen.     6. ENT  Hoarseness: Thought 2/2 mediastinal mass vs irritation from prior biopsy. Following with ENT, s/p vocal cord infection 7/1/21. Will see our voice clinic in Dec due to ongoing issues. This is improved.    Mucositis: Significant, 2/2 Doxil. Discussed dose reduction, ice mouth during infusion, scheduled salt/soda rinses, Nystatin at first signs of thrush, and MMW PRN. Biotene for dry mouth.      7. Pulm/Cards  Dyspnea with talking, prior  work-up with CT PE negative for PE, infection, pneumonitis; troponin negative; COVID negative. Sating well on RA. Agree with speech pathology thoughts on laryngeal dysfunction. Symptoms improved.       Continue Guaifenesin-Coedine PRN for cough, much improved.      Saw cardiology, echo WNL. Has mild CAD, recommended to start on statin. Will monitor for arthralgias and LFT elevation.     8. FEN  Hypokalemia: 2/2 Ifos and poor PO intake. Start potassium 20meq BID x 5 days. Recheck next week.     Hypophosphatemia: 2/2 ifos and poor PO intake. Start phosphorus 250mg BID x 5 days. Recheck next week.     Poor PO intake: 2/2 mucositis and nausea as above. Start protein shakes at least one per day.     9. Psych  Anxiety/depression: Hx of issues, now worse with feeling poorly during treatment. Continue Lorazepam PRN. Wait on selective serotonin reuptake inhibitor given issues in past. Placed referral for Thuan Hirsch.     60 minutes spent on the date of the encounter doing chart review, review of test results, interpretation of tests, patient visit and documentation     Maynor Rios PA-C  Department of Hematology and Oncology  [facility]

## 2021-11-23 ENCOUNTER — MYC MEDICAL ADVICE (OUTPATIENT)
Dept: ONCOLOGY | Facility: CLINIC | Age: 71
End: 2021-11-23

## 2021-11-23 ENCOUNTER — VIRTUAL VISIT (OUTPATIENT)
Dept: ONCOLOGY | Facility: CLINIC | Age: 71
End: 2021-11-23
Attending: PHYSICIAN ASSISTANT
Payer: MEDICARE

## 2021-11-23 DIAGNOSIS — F43.21 ADJUSTMENT DISORDER WITH DEPRESSED MOOD: ICD-10-CM

## 2021-11-23 DIAGNOSIS — C49.9 SPINDLE CELL SARCOMA (H): Primary | ICD-10-CM

## 2021-11-23 DIAGNOSIS — B37.0 THRUSH: ICD-10-CM

## 2021-11-23 PROCEDURE — 99215 OFFICE O/P EST HI 40 MIN: CPT | Mod: 95 | Performed by: PHYSICIAN ASSISTANT

## 2021-11-23 RX ORDER — NYSTATIN 100000/ML
500000 SUSPENSION, ORAL (FINAL DOSE FORM) ORAL 4 TIMES DAILY
Qty: 473 ML | Refills: 0 | Status: SHIPPED | OUTPATIENT
Start: 2021-11-23 | End: 2021-12-29

## 2021-11-23 RX ORDER — POTASSIUM CHLORIDE 1500 MG/1
20 TABLET, EXTENDED RELEASE ORAL 2 TIMES DAILY
Qty: 10 TABLET | Refills: 0 | Status: SHIPPED | OUTPATIENT
Start: 2021-11-23 | End: 2021-11-28

## 2021-11-23 NOTE — Clinical Note
11/23/2021         RE: Ifrah Huitron  [address] [place] [address]        Dear Colleague,    Thank you for referring your patient, Ifrah Huitron, to the [facility]. Please see a copy of my visit note below.    Ifrah is a 71 year old who is being evaluated via a billable video visit.      How would you like to obtain your AVS? MyChart  If the video visit is dropped, the invitation should be resent by: Text to cell phone: [phone]  Will anyone else be joining your video visit? Yes: wife, Abida. How would they like to receive their invitation? Text to cell phone: n/a  {If patient encounters technical issues they should call [phone] :[id number]}    Oncology/Hematology Visit Note  Nov 23, 2021       Reason for Visit: Follow up of spindle cell sarcoma     History of Present Illness: Ifrah Huitron is a 71 year old male with PMH rosacea, pituitary macroadenoma s/p resection, GERD, kidney stones, DJD with metastatic spindle cell sarcoma. He presented to his PCP March 2021 with chest pain/left shoulder and back pain that led to imaging which revealed multiple lung mets. There were difficulties in getting diagnostic biopsy, eventually biopsy of mediastinal mass with spindle cell sarcoma. There was high PD-L1 expression (90%). Baseline imaging 6/21/21 with progression of lung mets and left-sided subdiaphragmatic mass, stable liver lesions. 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      He started Keytruda 6/21/21. CT 8/2/21 with positive response to treatment. CT 10/18/21 with mixed response- LUQ mass larger, anterior mediastinal and left anterior pleural mass smaller. Keytruda stopped.    Started on Doxil + Ifosfamide 10/26/21.     Interval History:      Review of Systems:  Patient denies fevers, chills, night sweats, unexplained weight changes, headaches, dizziness, vision or hearing changes, new  "lumps or bumps, chest pain, shortness of breath, cough, abdominal pain, nausea, vomiting, changes to bowel or bladder, swelling of extremities, bleeding issues, or rash.    Current Outpatient Medications   Medication Sig Dispense Refill     acetaminophen (TYLENOL) 500 MG tablet Take 500-1,000 mg by mouth every 6 hours as needed for mild pain       calcium carbonate (TUMS) 500 MG chewable tablet Take 1 chew tab by mouth daily       celecoxib (CELEBREX) 200 MG capsule Take 1 capsule (200 mg) by mouth 2 times daily. Note this is a new a strength! Only one capsule at a time! 60 capsule 3     cholecalciferol (VITAMIN D-1000 MAX ST) 1000 units TABS Take 1,000 Units by mouth daily        fluconazole (DIFLUCAN) 200 MG tablet Take 2 tablets (400 mg) by mouth daily 7 tablet 0     guaiFENesin-codeine (GUAIFENESIN AC) 100-10 MG/5ML syrup Take 10 mLs by mouth every 4 hours as needed for cough 118 mL 0     hydrocortisone (CORTEF) 10 MG tablet Take 20 mg in the morning and 10 mg in the afternoon       hydrocortisone sodium succinate PF (SOLU-CORTEF) 100 MG injection Inject 2 mLs (100 mg) into the muscle once as needed (ADRENAL CRISIS) 2 mL 11     Insulin Syringe-Needle U-100 27.5G X 5/8\" 2 ML MISC 1 each daily as needed (with solucortef for adrenal crisis) 10 each 1     levothyroxine (SYNTHROID/LEVOTHROID) 75 MCG tablet Take 75 mcg by mouth every morning        lidocaine (XYLOCAINE) 2 % solution Swish and swallow 15 mLs in mouth every 3 hours as needed for moderate pain ; Max 8 doses/24 hour period. 200 mL 0     LORazepam (ATIVAN) 0.5 MG tablet Take 1 tablet (0.5 mg) by mouth every 4 hours as needed for nausea or vomiting . Caution: causes sedation. 30 tablet 0     magic mouthwash suspension, diphenhydrAMINE, lidocaine, aluminum-magnesium & simethicone, (FIRST-MOUTHWASH BLM) compounding kit Swish and swallow 5-10 mLs in mouth every 6 hours as needed for mouth sores 119 mL 0     Menthol-Methyl Salicylate (DALIA MALIK GREASELESS) cream " Apply topically every 6 hours as needed       metroNIDAZOLE (METROGEL) 0.75 % external gel Apply topically 2 times daily 45 g 11     nystatin (MYCOSTATIN) 269155 UNIT/ML suspension Take 5 mLs (500,000 Units) by mouth 4 times daily 60 mL 0     omeprazole (PRILOSEC) 20 MG DR capsule Take 20 mg by mouth       ondansetron (ZOFRAN) 4 MG tablet Take 1-2 tablets (4-8 mg) by mouth every 8 hours as needed for nausea 40 tablet 1     potassium & sodium phosphates (NEUTRA-PHOS) 280-160-250 MG Packet Take 1 packet by mouth 2 times daily 6 packet 0     prochlorperazine (COMPAZINE) 5 MG tablet Take 1 tablet (5 mg) by mouth every 6 hours as needed for nausea or vomiting . Caution: causes sedation. 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     Thyroid disease     removed pituitary gland     Past Surgical History:   Procedure Laterality Date     COLONOSCOPY N/A 12/17/2020    Procedure: COLONOSCOPY;  Surgeon: Ken Camacho MD;  Location: [facility]     ENT SURGERY       HERNIA REPAIR       LARYNGOSCOPY, EXCISE VOCAL CORD LESION MICROSCOPIC, COMBINED Left 7/1/2021    Procedure: MICROLARYNGOSCOPY, LEFT TRUE VOCAL CORD INJECTION WITH PROLARYN;  Surgeon: Kyree Bearden MD;  Location: [facility]     PHACOEMULSIFICATION WITH STANDARD INTRAOCULAR LENS IMPLANT Right 3/10/2021    Procedure: Cataract removal with implant.;  Surgeon: Jamir Mac MD;  Location: [facility]     PHACOEMULSIFICATION WITH STANDARD INTRAOCULAR LENS IMPLANT Left 4/5/2021    Procedure: Cataract removal with implant.;  Surgeon: Jamir Mac MD;  Location: [facility]     PITUITARY EXCISION       tooth pulled 4/7  Right      Allergies   Allergen Reactions     Penicillins  Hives and Swelling            Social History   Social History     Tobacco Use     Smoking status: Never Smoker     Smokeless tobacco: Never Used   Substance Use Topics     Alcohol use: Yes     Comment: rare     Drug use: Never      Past medical history and social history were reviewed.    Physical Examination:  There were no vitals taken for this visit.  Wt Readings from Last 10 Encounters:   11/12/21 68 kg (150 lb)   11/11/21 69.9 kg (154 lb)   11/04/21 70 kg (154 lb 6.4 oz)   11/02/21 85.9 kg (189 lb 6.4 oz)   09/17/21 73 kg (161 lb)   09/01/21 72.6 kg (160 lb)   08/11/21 72.6 kg (160 lb)   08/02/21 72.8 kg (160 lb 9.6 oz)   07/12/21 73 kg (161 lb)   07/01/21 73 kg (161 lb)     Constitutional: Well-appearing male in no acute distress.  Eyes: EOMI, PERRL. No scleral icterus.  ENT: Oral mucosa is moist without lesions or thrush.   Lymphatic: Neck is supple without cervical or supraclavicular lymphadenopathy. No axillary lymphadenopathy.  Breast: No palpable abnormalities in either breast. Nipples everted without drainage. No erythema or pitting.    Cardiovascular: Regular rate and rhythm. No murmurs, gallops, or rubs. No peripheral edema.  Respiratory: Clear to auscultation bilaterally. No wheezes or crackles.  Gastrointestinal: Bowel sounds present. Abdomen soft, non-tender. No palpable hepatosplenomegaly or masses.   Neurologic: Cranial nerves II through XII are grossly intact.  Skin: No rashes, petechiae, or bruising noted on exposed skin.    Laboratory Data:        Assessment and Plan:          Maynor Rios PA-C  Department of Hematology and Oncology  [facility]         Again, thank you for allowing me to participate in the care of your patient.        Sincerely,        GERALDO Mullins

## 2021-11-23 NOTE — Clinical Note
11/23/2021         RE: Ifrah Huitorn  [address] [place] [address]        Dear Colleague,    Thank you for referring your patient, Ifrah Huitron, to the [facility] [place]. Please see a copy of my visit note below.    Ifrah is a 71 year old who is being evaluated via a billable video visit.      How would you like to obtain your AVS? MyChart  If the video visit is dropped, the invitation should be resent by: Text to cell phone: [phone]  Will anyone else be joining your video visit? Yes: wife, Abida. How would they like to receive their invitation? Text to cell phone: n/a  {If patient encounters technical issues they should call [phone] :[phone]}    Oncology/Hematology Visit Note  Nov 23, 2021       Reason for Visit: Follow up of spindle cell sarcoma     History of Present Illness: Ifrah Huitron is a 71 year old male with PMH rosacea, pituitary macroadenoma s/p resection, GERD, kidney stones, DJD with metastatic spindle cell sarcoma. He presented to his PCP March 2021 with chest pain/left shoulder and back pain that led to imaging which revealed multiple lung mets. There were difficulties in getting diagnostic biopsy, eventually biopsy of mediastinal mass with spindle cell sarcoma. There was high PD-L1 expression (90%). Baseline imaging 6/21/21 with progression of lung mets and left-sided subdiaphragmatic mass, stable liver lesions. 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      He started Keytruda 6/21/21. CT 8/2/21 with positive response to treatment. CT 10/18/21 with mixed response- LUQ mass larger, anterior mediastinal and left anterior pleural mass smaller. Keytruda stopped.    Started on Doxil + Ifosfamide 10/26/21.     Interval History:      Review of Systems:  Patient denies fevers, chills, night sweats, unexplained weight changes, headaches, dizziness, vision or hearing changes, new  "lumps or bumps, chest pain, shortness of breath, cough, abdominal pain, nausea, vomiting, changes to bowel or bladder, swelling of extremities, bleeding issues, or rash.    Current Outpatient Medications   Medication Sig Dispense Refill     acetaminophen (TYLENOL) 500 MG tablet Take 500-1,000 mg by mouth every 6 hours as needed for mild pain       calcium carbonate (TUMS) 500 MG chewable tablet Take 1 chew tab by mouth daily       celecoxib (CELEBREX) 200 MG capsule Take 1 capsule (200 mg) by mouth 2 times daily. Note this is a new a strength! Only one capsule at a time! 60 capsule 3     cholecalciferol (VITAMIN D-1000 MAX ST) 1000 units TABS Take 1,000 Units by mouth daily        fluconazole (DIFLUCAN) 200 MG tablet Take 2 tablets (400 mg) by mouth daily 7 tablet 0     guaiFENesin-codeine (GUAIFENESIN AC) 100-10 MG/5ML syrup Take 10 mLs by mouth every 4 hours as needed for cough 118 mL 0     hydrocortisone (CORTEF) 10 MG tablet Take 20 mg in the morning and 10 mg in the afternoon       hydrocortisone sodium succinate PF (SOLU-CORTEF) 100 MG injection Inject 2 mLs (100 mg) into the muscle once as needed (ADRENAL CRISIS) 2 mL 11     Insulin Syringe-Needle U-100 27.5G X 5/8\" 2 ML MISC 1 each daily as needed (with solucortef for adrenal crisis) 10 each 1     levothyroxine (SYNTHROID/LEVOTHROID) 75 MCG tablet Take 75 mcg by mouth every morning        lidocaine (XYLOCAINE) 2 % solution Swish and swallow 15 mLs in mouth every 3 hours as needed for moderate pain ; Max 8 doses/24 hour period. 200 mL 0     LORazepam (ATIVAN) 0.5 MG tablet Take 1 tablet (0.5 mg) by mouth every 4 hours as needed for nausea or vomiting . Caution: causes sedation. 30 tablet 0     magic mouthwash suspension, diphenhydrAMINE, lidocaine, aluminum-magnesium & simethicone, (FIRST-MOUTHWASH BLM) compounding kit Swish and swallow 5-10 mLs in mouth every 6 hours as needed for mouth sores 119 mL 0     Menthol-Methyl Salicylate (DALIA MALIK GREASELESS) cream " Apply topically every 6 hours as needed       metroNIDAZOLE (METROGEL) 0.75 % external gel Apply topically 2 times daily 45 g 11     nystatin (MYCOSTATIN) 872797 UNIT/ML suspension Take 5 mLs (500,000 Units) by mouth 4 times daily 60 mL 0     omeprazole (PRILOSEC) 20 MG DR capsule Take 20 mg by mouth       ondansetron (ZOFRAN) 4 MG tablet Take 1-2 tablets (4-8 mg) by mouth every 8 hours as needed for nausea 40 tablet 1     potassium & sodium phosphates (NEUTRA-PHOS) 280-160-250 MG Packet Take 1 packet by mouth 2 times daily 6 packet 0     prochlorperazine (COMPAZINE) 5 MG tablet Take 1 tablet (5 mg) by mouth every 6 hours as needed for nausea or vomiting . Caution: causes sedation. 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     Thyroid disease     removed pituitary gland     Past Surgical History:   Procedure Laterality Date     COLONOSCOPY N/A 12/17/2020    Procedure: COLONOSCOPY;  Surgeon: Ken Camacho MD;  Location: [facility]     ENT SURGERY       HERNIA REPAIR       LARYNGOSCOPY, EXCISE VOCAL CORD LESION MICROSCOPIC, COMBINED Left 7/1/2021    Procedure: MICROLARYNGOSCOPY, LEFT TRUE VOCAL CORD INJECTION WITH PROLARYN;  Surgeon: Kyree Bearden MD;  Location: [facility]     PHACOEMULSIFICATION WITH STANDARD INTRAOCULAR LENS IMPLANT Right 3/10/2021    Procedure: Cataract removal with implant.;  Surgeon: Jamir Mac MD;  Location: [facility]     PHACOEMULSIFICATION WITH STANDARD INTRAOCULAR LENS IMPLANT Left 4/5/2021    Procedure: Cataract removal with implant.;  Surgeon: Jamir Mac MD;  Location: [facility]     PITUITARY EXCISION       tooth pulled 4/7  Right      Allergies   Allergen Reactions     Penicillins  Hives and Swelling            Social History   Social History     Tobacco Use     Smoking status: Never Smoker     Smokeless tobacco: Never Used   Substance Use Topics     Alcohol use: Yes     Comment: rare     Drug use: Never      Past medical history and social history were reviewed.    Physical Examination:  There were no vitals taken for this visit.  Wt Readings from Last 10 Encounters:   11/12/21 68 kg (150 lb)   11/11/21 69.9 kg (154 lb)   11/04/21 70 kg (154 lb 6.4 oz)   11/02/21 85.9 kg (189 lb 6.4 oz)   09/17/21 73 kg (161 lb)   09/01/21 72.6 kg (160 lb)   08/11/21 72.6 kg (160 lb)   08/02/21 72.8 kg (160 lb 9.6 oz)   07/12/21 73 kg (161 lb)   07/01/21 73 kg (161 lb)     Constitutional: Well-appearing male in no acute distress.  Eyes: EOMI, PERRL. No scleral icterus.  ENT: Oral mucosa is moist without lesions or thrush.   Lymphatic: Neck is supple without cervical or supraclavicular lymphadenopathy. No axillary lymphadenopathy.  Breast: No palpable abnormalities in either breast. Nipples everted without drainage. No erythema or pitting.    Cardiovascular: Regular rate and rhythm. No murmurs, gallops, or rubs. No peripheral edema.  Respiratory: Clear to auscultation bilaterally. No wheezes or crackles.  Gastrointestinal: Bowel sounds present. Abdomen soft, non-tender. No palpable hepatosplenomegaly or masses.   Neurologic: Cranial nerves II through XII are grossly intact.  Skin: No rashes, petechiae, or bruising noted on exposed skin.    Laboratory Data:        Assessment and Plan:          Maynor Rios PA-C  Department of Hematology and Oncology  [facility]         Again, thank you for allowing me to participate in the care of your patient.        Sincerely,        GERALDO Mullins

## 2021-11-24 ENCOUNTER — PATIENT OUTREACH (OUTPATIENT)
Dept: ONCOLOGY | Facility: CLINIC | Age: 71
End: 2021-11-24
Payer: MEDICARE

## 2021-11-24 NOTE — PATIENT INSTRUCTIONS
Mouth Sores  -1/2 tsp salt and 1/2 tsp baking soda in 8oz water. Swish and spit 4x per day. You can do this now and especially after the next cycle  -Will prescribe Nystatin (antifungal mouth rinse) to have as needed if you develop a white coating again   -Continue to use magic mouth wash (numbing mouthwash) as needed for pain  -Can try Biotene (over the counter) as needed for dry mouth   -During your next chemo infusion of Doxil (one day chemo drug) will have you ice your mouth/drink cold fluids.    Nausea/Upset Stomach  -Increase the Omeprazole to twice daily. OK to continue tums as needed  -Try the Ondansetron (Zofran) for nausea. You can take this consistently on a schedule if needed.  -Continue Prochlorperazine and Lorazepam as needed    Eating/Nutrients  -Start potassium and phosphorus supplement-both sent to local pharmacy. Both are one pill twice a day  -Add in a protein drink (ensure, boost) at least one per day, more if you are still having a hard time eating    Depression/Anxiety  -Lorazepam helps with anxiety symptoms but is brief  -Will see how the next few weeks go and determine if we should start an antidepressant  -Will reach out to our cancer psychologist for a visit     Chemo Plan  -Wait until at least next week. I will do a video visit with you on Tuesday (will have schedulers call you with time) to see how you are doing  -Will be reducing the dose due to toxicity issues this first round so it should be better tolerated

## 2021-11-26 DIAGNOSIS — C45.9 MESOTHELIOMA, MALIGNANT (H): ICD-10-CM

## 2021-11-26 DIAGNOSIS — R49.0 MUSCLE TENSION DYSPHONIA: ICD-10-CM

## 2021-11-26 DIAGNOSIS — R49.0 DYSPHONIA: ICD-10-CM

## 2021-11-26 DIAGNOSIS — R05.9 COUGH: ICD-10-CM

## 2021-11-26 DIAGNOSIS — J38.01 VOCAL FOLD PARALYSIS, LEFT: ICD-10-CM

## 2021-11-26 NOTE — CONFIDENTIAL NOTE
Guaifenesin- codeine   Last prescribing provider: Maynor CHOW    Last clinic visit date: 11/23/21 Maynor CHOW      Any missed appointments or no-shows since last clinic visit?: No     Recommendations for requested medication (if none, N/A): Copied from chart note 11/23/21 Maynor CHOW    guaiFENesin-codeine (GUAIFENESIN AC) 100-10 MG/5ML syrup Take 10 mLs by mouth every 4 hours as needed for cough 118 mL 0      Next clinic visit date: 11/30/21 Maynor CHOW    Any other pertinent information (if none, N/A): N/A

## 2021-11-27 RX ORDER — CODEINE PHOSPHATE/GUAIFENESIN 10-100MG/5
10 LIQUID (ML) ORAL EVERY 4 HOURS PRN
Qty: 118 ML | Refills: 0 | Status: SHIPPED | OUTPATIENT
Start: 2021-11-27 | End: 2022-03-09

## 2021-11-29 ENCOUNTER — LAB (OUTPATIENT)
Dept: LAB | Facility: CLINIC | Age: 71
End: 2021-11-29
Payer: MEDICARE

## 2021-11-29 DIAGNOSIS — C49.9 SPINDLE CELL SARCOMA (H): ICD-10-CM

## 2021-11-29 LAB
ALBUMIN SERPL-MCNC: 3.2 G/DL (ref 3.4–5)
ALP SERPL-CCNC: 442 U/L (ref 40–150)
ALT SERPL W P-5'-P-CCNC: 137 U/L (ref 0–70)
ANION GAP SERPL CALCULATED.3IONS-SCNC: 6 MMOL/L (ref 3–14)
AST SERPL W P-5'-P-CCNC: 113 U/L (ref 0–45)
BILIRUB SERPL-MCNC: 0.5 MG/DL (ref 0.2–1.3)
BUN SERPL-MCNC: 20 MG/DL (ref 7–30)
CALCIUM SERPL-MCNC: 9.5 MG/DL (ref 8.5–10.1)
CHLORIDE BLD-SCNC: 106 MMOL/L (ref 94–109)
CO2 SERPL-SCNC: 29 MMOL/L (ref 20–32)
CREAT SERPL-MCNC: 0.98 MG/DL (ref 0.66–1.25)
GFR SERPL CREATININE-BSD FRML MDRD: 76 ML/MIN/1.73M2
GLUCOSE BLD-MCNC: 91 MG/DL (ref 70–99)
MAGNESIUM SERPL-MCNC: 2.3 MG/DL (ref 1.6–2.3)
PHOSPHATE SERPL-MCNC: 3.2 MG/DL (ref 2.5–4.5)
POTASSIUM BLD-SCNC: 4.6 MMOL/L (ref 3.4–5.3)
PROT SERPL-MCNC: 7 G/DL (ref 6.8–8.8)
SODIUM SERPL-SCNC: 141 MMOL/L (ref 133–144)

## 2021-11-29 PROCEDURE — 84100 ASSAY OF PHOSPHORUS: CPT

## 2021-11-29 PROCEDURE — 36415 COLL VENOUS BLD VENIPUNCTURE: CPT

## 2021-11-29 PROCEDURE — 83735 ASSAY OF MAGNESIUM: CPT

## 2021-11-29 PROCEDURE — 80053 COMPREHEN METABOLIC PANEL: CPT

## 2021-11-30 ENCOUNTER — VIRTUAL VISIT (OUTPATIENT)
Dept: ONCOLOGY | Facility: CLINIC | Age: 71
End: 2021-11-30
Attending: PHYSICIAN ASSISTANT
Payer: MEDICARE

## 2021-11-30 DIAGNOSIS — C49.9 SPINDLE CELL SARCOMA (H): Primary | ICD-10-CM

## 2021-11-30 DIAGNOSIS — C45.9 MESOTHELIOMA, MALIGNANT (H): ICD-10-CM

## 2021-11-30 DIAGNOSIS — Z51.89 ENCOUNTER FOR OTHER SPECIFIED AFTERCARE: ICD-10-CM

## 2021-11-30 DIAGNOSIS — D70.1 CHEMOTHERAPY-INDUCED NEUTROPENIA (H): ICD-10-CM

## 2021-11-30 DIAGNOSIS — T45.1X5A CHEMOTHERAPY-INDUCED NEUTROPENIA (H): ICD-10-CM

## 2021-11-30 PROCEDURE — 99215 OFFICE O/P EST HI 40 MIN: CPT | Mod: 95 | Performed by: PHYSICIAN ASSISTANT

## 2021-11-30 RX ORDER — METHYLPREDNISOLONE SODIUM SUCCINATE 125 MG/2ML
125 INJECTION, POWDER, LYOPHILIZED, FOR SOLUTION INTRAMUSCULAR; INTRAVENOUS
Status: CANCELLED
Start: 2021-12-01

## 2021-11-30 RX ORDER — PROCHLORPERAZINE MALEATE 5 MG
5 TABLET ORAL EVERY 6 HOURS PRN
Status: CANCELLED
Start: 2021-12-01

## 2021-11-30 RX ORDER — ONDANSETRON 4 MG/1
8 TABLET, FILM COATED ORAL EVERY 8 HOURS
Status: CANCELLED
Start: 2021-12-01

## 2021-11-30 RX ORDER — DIPHENHYDRAMINE HYDROCHLORIDE 50 MG/ML
50 INJECTION INTRAMUSCULAR; INTRAVENOUS
Status: CANCELLED
Start: 2021-12-01

## 2021-11-30 RX ORDER — MEPERIDINE HYDROCHLORIDE 25 MG/ML
25 INJECTION INTRAMUSCULAR; INTRAVENOUS; SUBCUTANEOUS EVERY 30 MIN PRN
Status: CANCELLED | OUTPATIENT
Start: 2021-12-01

## 2021-11-30 RX ORDER — ALBUTEROL SULFATE 0.83 MG/ML
2.5 SOLUTION RESPIRATORY (INHALATION)
Status: CANCELLED | OUTPATIENT
Start: 2021-12-01

## 2021-11-30 RX ORDER — NALOXONE HYDROCHLORIDE 0.4 MG/ML
0.2 INJECTION, SOLUTION INTRAMUSCULAR; INTRAVENOUS; SUBCUTANEOUS
Status: CANCELLED | OUTPATIENT
Start: 2021-12-01

## 2021-11-30 RX ORDER — DEXTROSE MONOHYDRATE 50 MG/ML
10-20 INJECTION, SOLUTION INTRAVENOUS
Status: CANCELLED | OUTPATIENT
Start: 2021-12-08

## 2021-11-30 RX ORDER — ALBUTEROL SULFATE 90 UG/1
1-2 AEROSOL, METERED RESPIRATORY (INHALATION)
Status: CANCELLED
Start: 2021-12-01

## 2021-11-30 RX ORDER — EPINEPHRINE 1 MG/ML
0.3 INJECTION, SOLUTION INTRAMUSCULAR; SUBCUTANEOUS EVERY 5 MIN PRN
Status: CANCELLED | OUTPATIENT
Start: 2021-12-01

## 2021-11-30 NOTE — PROGRESS NOTES
Ifrah is a 71 year old who is being evaluated via a billable video visit.  Patient reviewed medications and allergies via "[employer] e-check in.       How would you like to obtain your AVS? [employer]  If the video visit is dropped, the invitation should be resent by: Text to cell phone: [phone]  Will anyone else be joining your video visit? Yes: wife, Abida. How would they like to receive their invitation? Text to cell phone: n/a      Oncology/Hematology Visit Note  Nov 30, 2021    Reason for Visit: Follow up of spindle cell sarcoma     History of Present Illness: Ifrah Huitron is a 71 year old male with PMH rosacea, pituitary macroadenoma s/p resection, GERD, kidney stones, DJD with metastatic spindle cell sarcoma. He presented to his PCP March 2021 with chest pain/left shoulder and back pain that led to imaging which revealed multiple lung mets. There were difficulties in getting diagnostic biopsy, eventually biopsy of mediastinal mass with spindle cell sarcoma. There was high PD-L1 expression (90%). Baseline imaging 6/21/21 with progression of lung mets and left-sided subdiaphragmatic mass, stable liver lesions. 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      He started Keytruda 6/21/21. CT 8/2/21 with positive response to treatment. CT 10/18/21 with mixed response- LUQ mass larger, anterior mediastinal and left anterior pleural mass smaller. Keytruda stopped.     Started on Doxil + Ifosfamide 10/26/21. Poorly tolerated with mucositis, nausea, poor oral intake.     Treated delayed last week for poor performance status. He was called today for follow-up for consideration of cycle 2.     Interval History:  Ifrah was called today for follow-up on video. He is feeling really well. His energy levels are back, he is eating normally with good appetite. Mouth sores and dry mouth improved. Minimal nausea-Zofran helps. He increased his  "Omeprazole but isn't sure how much it is helping. His cancer pain is basically gone. No breathing concerns. No skin issues. He did have tingling on his right side yesterday but this resolved. He has had intermittent tingling since treatment on both sides but nothing that lasts. Denies any bowel concerns. Mood is bright.     Current Outpatient Medications   Medication Sig Dispense Refill     acetaminophen (TYLENOL) 500 MG tablet Take 500-1,000 mg by mouth every 6 hours as needed for mild pain       calcium carbonate (TUMS) 500 MG chewable tablet Take 1 chew tab by mouth daily       celecoxib (CELEBREX) 200 MG capsule Take 1 capsule (200 mg) by mouth 2 times daily. Note this is a new a strength! Only one capsule at a time! 60 capsule 3     cholecalciferol (VITAMIN D-1000 MAX ST) 1000 units TABS Take 1,000 Units by mouth daily        guaiFENesin-codeine (GUAIFENESIN AC) 100-10 MG/5ML syrup Take 10 mLs by mouth every 4 hours as needed for cough 118 mL 0     hydrocortisone (CORTEF) 10 MG tablet Take 20 mg in the morning and 10 mg in the afternoon       Insulin Syringe-Needle U-100 27.5G X 5/8\" 2 ML MISC 1 each daily as needed (with solucortef for adrenal crisis) 10 each 1     levothyroxine (SYNTHROID/LEVOTHROID) 75 MCG tablet Take 75 mcg by mouth every morning        LORazepam (ATIVAN) 0.5 MG tablet Take 1 tablet (0.5 mg) by mouth every 4 hours as needed for nausea or vomiting . Caution: causes sedation. 30 tablet 0     Menthol-Methyl Salicylate (DALIA MALIK GREASELESS) cream Apply topically every 6 hours as needed       metroNIDAZOLE (METROGEL) 0.75 % external gel Apply topically 2 times daily 45 g 11     nystatin (MYCOSTATIN) 372516 UNIT/ML suspension Take 5 mLs (500,000 Units) by mouth 4 times daily . Start at first signs of white coating on tongue. 473 mL 0     omeprazole (PRILOSEC) 20 MG DR capsule Take 1 capsule (20 mg) by mouth 2 times daily 60 capsule 1     ondansetron (ZOFRAN) 4 MG tablet Take 1-2 tablets (4-8 mg) " by mouth every 8 hours as needed for nausea 40 tablet 1     prochlorperazine (COMPAZINE) 5 MG tablet Take 1 tablet (5 mg) by mouth every 6 hours as needed for nausea or vomiting . Caution: causes sedation. 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     Thyroid disease     removed pituitary gland     Past Surgical History:   Procedure Laterality Date     COLONOSCOPY N/A 12/17/2020    Procedure: COLONOSCOPY;  Surgeon: Ken Camacho MD;  Location: [facility]     ENT SURGERY       HERNIA REPAIR       LARYNGOSCOPY, EXCISE VOCAL CORD LESION MICROSCOPIC, COMBINED Left 7/1/2021    Procedure: MICROLARYNGOSCOPY, LEFT TRUE VOCAL CORD INJECTION WITH PROLARYN;  Surgeon: Kyree Bearden MD;  Location: [facility]     PHACOEMULSIFICATION WITH STANDARD INTRAOCULAR LENS IMPLANT Right 3/10/2021    Procedure: Cataract removal with implant.;  Surgeon: Jamir Mac MD;  Location: [facility]     PHACOEMULSIFICATION WITH STANDARD INTRAOCULAR LENS IMPLANT Left 4/5/2021    Procedure: Cataract removal with implant.;  Surgeon: Jamir Mac MD;  Location: [facility]     PITUITARY EXCISION       tooth pulled 4/7  Right      Allergies   Allergen Reactions     Penicillins Hives and Swelling            Social History   Social History     Tobacco Use     Smoking status: Never Smoker     Smokeless tobacco: Never Used   Substance Use Topics     Alcohol use: Yes     Comment: rare     Drug use: Never      Past medical history and social history were reviewed.    Physical Examination:  There were no vitals taken for this visit.  Wt Readings from Last 10 Encounters:   11/12/21 68 kg (150 lb)   11/11/21 69.9 kg (154 lb)   11/04/21 70 kg (154 lb 6.4 oz)    11/02/21 85.9 kg (189 lb 6.4 oz)   09/17/21 73 kg (161 lb)   09/01/21 72.6 kg (160 lb)   08/11/21 72.6 kg (160 lb)   08/02/21 72.8 kg (160 lb 9.6 oz)   07/12/21 73 kg (161 lb)   07/01/21 73 kg (161 lb)     Video physical exam  General: Patient appears well in no acute distress.   Skin: No visualized rash or lesions on visualized skin  Eyes: EOMI, no erythema, sclera icterus or discharge noted  Resp: Appears to be breathing comfortably without accessory muscle usage, speaking in full sentences, no cough  MSK: Appears to have normal range of motion based on visualized movements  Neurologic: No apparent tremors, facial movements symmetric  Psych: affect normal, alert and oriented    The rest of a comprehensive physical examination is deferred due to PHE (public health emergency) video restrictions    Laboratory Data:  Results for MARISOL XIE (MRN [id number]) as of 11/30/2021 09:19   11/29/2021 10:53   Sodium 141   Potassium 4.6   Chloride 106   Carbon Dioxide 29   Urea Nitrogen 20   Creatinine 0.98   GFR Estimate 76   Calcium 9.5   Anion Gap 6   Magnesium 2.3   Phosphorus 3.2   Albumin 3.2 (L)   Protein Total 7.0   Bilirubin Total 0.5   Alkaline Phosphatase 442 (H)    (H)    (H)     Results for MARISOL XIE (MRN [id number]) as of 11/30/2021 09:19   11/22/2021 11:21   WBC 12.8 (H)   Hemoglobin 13.2 (L)   Hematocrit 40.9   Platelet Count 292   RBC Count 4.78   MCV 86   MCH 27.6   MCHC 32.3   RDW 14.6   % Neutrophils 65   % Lymphocytes 19   % Monocytes 12   % Eosinophils 0   % Basophils 2   Absolute Basophils 0.2   Absolute Eosinophils 0.0   Absolute Immature Granulocytes 0.3 (H)   Absolute Lymphocytes 2.5   Absolute Monocytes 1.6 (H)   % Immature Granulocytes 2   Absolute Neutrophils 8.3   Absolute NRBCs 0.0   NRBCs per 100 WBC 0       Assessment and Plan:  1. 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 High PD-L1. Was on Keytruda but had progression, now on  Doxil + Ifos started 10/26/21.     Did not tolerate well-had significant mucositis, poor PO intake, nausea, and electrolyte derrangements. He has had less cancer pain and CT with stable disease. Treatment was deferred a week. Now feeling great and ready for cycle 2.      Will admit tomorrow for cycle 2 with dose reduction of Doxil to 70mg and Ifos by 10%.  Will plan for MARLEEN visit after discharge given issues last cycle.     LFT elevation is likely from statin- will hold. Did confirm with Dr. Mariella HOGAN to go ahead with treatment with LFT elevation and will monitor for improvement with holding statin.      2. GI  Dyspepsia: Continue Omeprazole back to 20mg BID. Continue Tums PRN     CINV: Continue Zofran and can take scheduled if needed. Continue Compazine and Lorazepam PRN. Continue Emend inpatient on day 1 and day 6.      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 Cal Saba endocrine.     Hypothyroidism continue Synthroid 75mcg daily.      4. Derm  Rosacea: Long standing issue, continue Metrogel PRN. No new skin concerns.      5. MSK  Left shoulder/back/chest wall pain 2/2 tumor, following with palliative. Continue topical Bengay and PO Celebrex BID. Pain improved.    Monitor tingling/neuropathy.      6. ENT  Hoarseness: Thought 2/2 mediastinal mass vs irritation from prior biopsy. Following with ENT, s/p vocal cord infection 7/1/21. Will see our voice clinic in Dec due to ongoing issues. This is improved.     Mucositis: Significant, 2/2 Doxil. Now resolved. Discussed dose reduction, ice mouth during infusion, scheduled salt/soda rinses, Nystatin at first signs of thrush, and MMW PRN. Biotene for dry mouth.      7. Pulm/Cards  Dyspnea with talking, prior work-up with CT PE negative for PE, infection, pneumonitis; troponin negative; COVID negative. Sating well on RA. Agree with speech pathology thoughts on laryngeal dysfunction. Symptoms improved.       Continue Guaifenesin-[place] PRN  for cough, much improved.      Saw cardiology, echo WNL. Has mild CAD, recommended to start on statin. LFT are elevated. Will HOLD statin for now and monitor for improvement.      8. FEN  Hypokalemia: 2/2 Ifos and poor PO intake. Now WNL.      Hypophosphatemia: 2/2 ifos and poor PO intake. Now WNL     Poor PO intake: 2/2 mucositis and nausea as above. Improved now, good appetite and eating well.      9. Psych  Anxiety/depression: Hx of issues, recurrent issue when he was feeling poorly from treatment. Now that he is physically doing better his mood is much improved. Previously placed health psych referral.    35 minutes spent on the date of the encounter doing chart review, review of test results, interpretation of tests, patient visit, documentation and discussion with other provider(s)     Maynor Rios PA-C  Department of Hematology and Oncology  [facility]

## 2021-11-30 NOTE — LETTER
11/30/2021         RE: Ifrah Huitron  [address] [place] [address]        Dear Colleague,    Thank you for referring your patient, Ifrah Huitron, to the [facility] [place]. Please see a copy of my visit note below.    Ifrah is a 71 year old who is being evaluated via a billable video visit.  Patient reviewed medications and allergies via [employer] e-check in.       How would you like to obtain your AVS? [employer]  If the video visit is dropped, the invitation should be resent by: Text to cell phone: [phone]  Will anyone else be joining your video visit? Yes: wife, Abida. How would they like to receive their invitation? Text to cell phone: n/a      Oncology/Hematology Visit Note  Nov 30, 2021    Reason for Visit: Follow up of spindle cell sarcoma     History of Present Illness: Ifrah Huitron is a 71 year old male with PMH rosacea, pituitary macroadenoma s/p resection, GERD, kidney stones, DJD with metastatic spindle cell sarcoma. He presented to his PCP March 2021 with chest pain/left shoulder and back pain that led to imaging which revealed multiple lung mets. There were difficulties in getting diagnostic biopsy, eventually biopsy of mediastinal mass with spindle cell sarcoma. There was high PD-L1 expression (90%). Baseline imaging 6/21/21 with progression of lung mets and left-sided subdiaphragmatic mass, stable liver lesions. 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      He started Keytruda 6/21/21. CT 8/2/21 with positive response to treatment. CT 10/18/21 with mixed response- LUQ mass larger, anterior mediastinal and left anterior pleural mass smaller. Keytruda stopped.     Started on Doxil + Ifosfamide 10/26/21. Poorly tolerated with mucositis, nausea, poor oral intake.     Treated delayed last week for poor performance status. He was called today for follow-up for consideration of cycle 2.  "    Interval History:  Ifrah was called today for follow-up on video. He is feeling really well. His energy levels are back, he is eating normally with good appetite. Mouth sores and dry mouth improved. Minimal nausea-Zofran helps. He increased his Omeprazole but isn't sure how much it is helping. His cancer pain is basically gone. No breathing concerns. No skin issues. He did have tingling on his right side yesterday but this resolved. He has had intermittent tingling since treatment on both sides but nothing that lasts. Denies any bowel concerns. Mood is bright.     Current Outpatient Medications   Medication Sig Dispense Refill     acetaminophen (TYLENOL) 500 MG tablet Take 500-1,000 mg by mouth every 6 hours as needed for mild pain       calcium carbonate (TUMS) 500 MG chewable tablet Take 1 chew tab by mouth daily       celecoxib (CELEBREX) 200 MG capsule Take 1 capsule (200 mg) by mouth 2 times daily. Note this is a new a strength! Only one capsule at a time! 60 capsule 3     cholecalciferol (VITAMIN D-1000 MAX ST) 1000 units TABS Take 1,000 Units by mouth daily        guaiFENesin-codeine (GUAIFENESIN AC) 100-10 MG/5ML syrup Take 10 mLs by mouth every 4 hours as needed for cough 118 mL 0     hydrocortisone (CORTEF) 10 MG tablet Take 20 mg in the morning and 10 mg in the afternoon       Insulin Syringe-Needle U-100 27.5G X 5/8\" 2 ML MISC 1 each daily as needed (with solucortef for adrenal crisis) 10 each 1     levothyroxine (SYNTHROID/LEVOTHROID) 75 MCG tablet Take 75 mcg by mouth every morning        LORazepam (ATIVAN) 0.5 MG tablet Take 1 tablet (0.5 mg) by mouth every 4 hours as needed for nausea or vomiting . Caution: causes sedation. 30 tablet 0     Menthol-Methyl Salicylate (DALIA MALIK GREASELESS) cream Apply topically every 6 hours as needed       metroNIDAZOLE (METROGEL) 0.75 % external gel Apply topically 2 times daily 45 g 11     nystatin (MYCOSTATIN) 829340 UNIT/ML suspension Take 5 mLs (500,000 Units) by " mouth 4 times daily . Start at first signs of white coating on tongue. 473 mL 0     omeprazole (PRILOSEC) 20 MG DR capsule Take 1 capsule (20 mg) by mouth 2 times daily 60 capsule 1     ondansetron (ZOFRAN) 4 MG tablet Take 1-2 tablets (4-8 mg) by mouth every 8 hours as needed for nausea 40 tablet 1     prochlorperazine (COMPAZINE) 5 MG tablet Take 1 tablet (5 mg) by mouth every 6 hours as needed for nausea or vomiting . Caution: causes sedation. 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     Thyroid disease     removed pituitary gland     Past Surgical History:   Procedure Laterality Date     COLONOSCOPY N/A 12/17/2020    Procedure: COLONOSCOPY;  Surgeon: eKn Camacho MD;  Location: [facility]     ENT SURGERY       HERNIA REPAIR       LARYNGOSCOPY, EXCISE VOCAL CORD LESION MICROSCOPIC, COMBINED Left 7/1/2021    Procedure: MICROLARYNGOSCOPY, LEFT TRUE VOCAL CORD INJECTION WITH PROLARYN;  Surgeon: Kyree Bearden MD;  Location: [facility]     PHACOEMULSIFICATION WITH STANDARD INTRAOCULAR LENS IMPLANT Right 3/10/2021    Procedure: Cataract removal with implant.;  Surgeon: Jamir Mac MD;  Location: [facility]     PHACOEMULSIFICATION WITH STANDARD INTRAOCULAR LENS IMPLANT Left 4/5/2021    Procedure: Cataract removal with implant.;  Surgeon: Jamir Mac MD;  Location: [facility]     PITUITARY EXCISION       tooth pulled 4/7  Right      Allergies   Allergen Reactions     Penicillins Hives and Swelling            Social History   Social History     Tobacco Use     Smoking status: Never Smoker     Smokeless tobacco: Never Used   Substance Use Topics     Alcohol use: Yes     Comment: rare     Drug use: Never      Past  medical history and social history were reviewed.    Physical Examination:  There were no vitals taken for this visit.  Wt Readings from Last 10 Encounters:   11/12/21 68 kg (150 lb)   11/11/21 69.9 kg (154 lb)   11/04/21 70 kg (154 lb 6.4 oz)   11/02/21 85.9 kg (189 lb 6.4 oz)   09/17/21 73 kg (161 lb)   09/01/21 72.6 kg (160 lb)   08/11/21 72.6 kg (160 lb)   08/02/21 72.8 kg (160 lb 9.6 oz)   07/12/21 73 kg (161 lb)   07/01/21 73 kg (161 lb)     Video physical exam  General: Patient appears well in no acute distress.   Skin: No visualized rash or lesions on visualized skin  Eyes: EOMI, no erythema, sclera icterus or discharge noted  Resp: Appears to be breathing comfortably without accessory muscle usage, speaking in full sentences, no cough  MSK: Appears to have normal range of motion based on visualized movements  Neurologic: No apparent tremors, facial movements symmetric  Psych: affect normal, alert and oriented    The rest of a comprehensive physical examination is deferred due to PHE (public health emergency) video restrictions    Laboratory Data:  Results for MARISOL XIE (MRN [id number]) as of 11/30/2021 09:19   11/29/2021 10:53   Sodium 141   Potassium 4.6   Chloride 106   Carbon Dioxide 29   Urea Nitrogen 20   Creatinine 0.98   GFR Estimate 76   Calcium 9.5   Anion Gap 6   Magnesium 2.3   Phosphorus 3.2   Albumin 3.2 (L)   Protein Total 7.0   Bilirubin Total 0.5   Alkaline Phosphatase 442 (H)    (H)    (H)     Results for MARISOL XIE (MRN [id number]) as of 11/30/2021 09:19   11/22/2021 11:21   WBC 12.8 (H)   Hemoglobin 13.2 (L)   Hematocrit 40.9   Platelet Count 292   RBC Count 4.78   MCV 86   MCH 27.6   MCHC 32.3   RDW 14.6   % Neutrophils 65   % Lymphocytes 19   % Monocytes 12   % Eosinophils 0   % Basophils 2   Absolute Basophils 0.2   Absolute Eosinophils 0.0   Absolute Immature Granulocytes 0.3 (H)   Absolute Lymphocytes 2.5   Absolute Monocytes 1.6 (H)   % Immature Granulocytes 2    Absolute Neutrophils 8.3   Absolute NRBCs 0.0   NRBCs per 100 WBC 0       Assessment and Plan:  1. 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 High PD-L1. Was on Keytruda but had progression, now on Doxil + Ifos started 10/26/21.     Did not tolerate well-had significant mucositis, poor PO intake, nausea, and electrolyte derrangements. He has had less cancer pain and CT with stable disease. Treatment was deferred a week. Now feeling great and ready for cycle 2.      Will admit tomorrow for cycle 2 with dose reduction of Doxil to 70mg and Ifos by 10%.  Will plan for MARLEEN visit after discharge given issues last cycle.     LFT elevation is likely from statin- will hold. Did confirm with Dr. Mariella HOGAN to go ahead with treatment with LFT elevation and will monitor for improvement with holding statin.      2. GI  Dyspepsia: Continue Omeprazole back to 20mg BID. Continue Tums PRN     CINV: Continue Zofran and can take scheduled if needed. Continue Compazine and Lorazepam PRN. Continue Emend inpatient on day 1 and day 6.      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 Cal Saba endocrine.     Hypothyroidism continue Synthroid 75mcg daily.      4. Derm  Rosacea: Long standing issue, continue Metrogel PRN. No new skin concerns.      5. MSK  Left shoulder/back/chest wall pain 2/2 tumor, following with palliative. Continue topical Bengay and PO Celebrex BID. Pain improved.    Monitor tingling/neuropathy.      6. ENT  Hoarseness: Thought 2/2 mediastinal mass vs irritation from prior biopsy. Following with ENT, s/p vocal cord infection 7/1/21. Will see our voice clinic in Dec due to ongoing issues. This is improved.     Mucositis: Significant, 2/2 Doxil. Now resolved. Discussed dose reduction, ice mouth during infusion, scheduled salt/soda rinses, Nystatin at first signs of thrush, and MMW PRN. Biotene for dry mouth.      7. Pulm/Cards  Dyspnea with  talking, prior work-up with CT PE negative for PE, infection, pneumonitis; troponin negative; COVID negative. Sating well on RA. Agree with speech pathology thoughts on laryngeal dysfunction. Symptoms improved.       Continue Guaifenesin-Coedine PRN for cough, much improved.      Saw cardiology, echo WNL. Has mild CAD, recommended to start on statin. LFT are elevated. Will HOLD statin for now and monitor for improvement.      8. FEN  Hypokalemia: 2/2 Ifos and poor PO intake. Now WNL.      Hypophosphatemia: 2/2 ifos and poor PO intake. Now WNL     Poor PO intake: 2/2 mucositis and nausea as above. Improved now, good appetite and eating well.      9. Psych  Anxiety/depression: Hx of issues, recurrent issue when he was feeling poorly from treatment. Now that he is physically doing better his mood is much improved. Previously placed health psych referral.    35 minutes spent on the date of the encounter doing chart review, review of test results, interpretation of tests, patient visit, documentation and discussion with other provider(s)     Maynor Rios PA-C  Department of Hematology and Oncology  [facility]         Again, thank you for allowing me to participate in the care of your patient.        Sincerely,        GERALDO Mullins

## 2021-11-30 NOTE — LETTER
11/30/2021         RE: Ifrah Huitron  [address] [place] [address]        Dear Colleague,    Thank you for referring your patient, Ifrah Huitron, to the [facility] [place]. Please see a copy of my visit note below.    Ifrah is a 71 year old who is being evaluated via a billable video visit.  Patient reviewed medications and allergies via [employer] e-check in.       How would you like to obtain your AVS? [employer]  If the video visit is dropped, the invitation should be resent by: Text to cell phone: [phone]  Will anyone else be joining your video visit? Yes: wife, Abida. How would they like to receive their invitation? Text to cell phone: n/a      Oncology/Hematology Visit Note  Nov 30, 2021    Reason for Visit: Follow up of spindle cell sarcoma     History of Present Illness: Ifrah Huitron is a 71 year old male with PMH rosacea, pituitary macroadenoma s/p resection, GERD, kidney stones, DJD with metastatic spindle cell sarcoma. He presented to his PCP March 2021 with chest pain/left shoulder and back pain that led to imaging which revealed multiple lung mets. There were difficulties in getting diagnostic biopsy, eventually biopsy of mediastinal mass with spindle cell sarcoma. There was high PD-L1 expression (90%). Baseline imaging 6/21/21 with progression of lung mets and left-sided subdiaphragmatic mass, stable liver lesions. 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      He started Keytruda 6/21/21. CT 8/2/21 with positive response to treatment. CT 10/18/21 with mixed response- LUQ mass larger, anterior mediastinal and left anterior pleural mass smaller. Keytruda stopped.     Started on Doxil + Ifosfamide 10/26/21. Poorly tolerated with mucositis, nausea, poor oral intake.     Treated delayed last week for poor performance status. He was called today for follow-up for consideration of cycle 2.  "    Interval History:  Ifrah was called today for follow-up on video. He is feeling really well. His energy levels are back, he is eating normally with good appetite. Mouth sores and dry mouth improved. Minimal nausea-Zofran helps. He increased his Omeprazole but isn't sure how much it is helping. His cancer pain is basically gone. No breathing concerns. No skin issues. He did have tingling on his right side yesterday but this resolved. He has had intermittent tingling since treatment on both sides but nothing that lasts. Denies any bowel concerns. Mood is bright.     Current Outpatient Medications   Medication Sig Dispense Refill     acetaminophen (TYLENOL) 500 MG tablet Take 500-1,000 mg by mouth every 6 hours as needed for mild pain       calcium carbonate (TUMS) 500 MG chewable tablet Take 1 chew tab by mouth daily       celecoxib (CELEBREX) 200 MG capsule Take 1 capsule (200 mg) by mouth 2 times daily. Note this is a new a strength! Only one capsule at a time! 60 capsule 3     cholecalciferol (VITAMIN D-1000 MAX ST) 1000 units TABS Take 1,000 Units by mouth daily        guaiFENesin-codeine (GUAIFENESIN AC) 100-10 MG/5ML syrup Take 10 mLs by mouth every 4 hours as needed for cough 118 mL 0     hydrocortisone (CORTEF) 10 MG tablet Take 20 mg in the morning and 10 mg in the afternoon       Insulin Syringe-Needle U-100 27.5G X 5/8\" 2 ML MISC 1 each daily as needed (with solucortef for adrenal crisis) 10 each 1     levothyroxine (SYNTHROID/LEVOTHROID) 75 MCG tablet Take 75 mcg by mouth every morning        LORazepam (ATIVAN) 0.5 MG tablet Take 1 tablet (0.5 mg) by mouth every 4 hours as needed for nausea or vomiting . Caution: causes sedation. 30 tablet 0     Menthol-Methyl Salicylate (DALIA MALIK GREASELESS) cream Apply topically every 6 hours as needed       metroNIDAZOLE (METROGEL) 0.75 % external gel Apply topically 2 times daily 45 g 11     nystatin (MYCOSTATIN) 314322 UNIT/ML suspension Take 5 mLs (500,000 Units) by " mouth 4 times daily . Start at first signs of white coating on tongue. 473 mL 0     omeprazole (PRILOSEC) 20 MG DR capsule Take 1 capsule (20 mg) by mouth 2 times daily 60 capsule 1     ondansetron (ZOFRAN) 4 MG tablet Take 1-2 tablets (4-8 mg) by mouth every 8 hours as needed for nausea 40 tablet 1     prochlorperazine (COMPAZINE) 5 MG tablet Take 1 tablet (5 mg) by mouth every 6 hours as needed for nausea or vomiting . Caution: causes sedation. 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     Thyroid disease     removed pituitary gland     Past Surgical History:   Procedure Laterality Date     COLONOSCOPY N/A 12/17/2020    Procedure: COLONOSCOPY;  Surgeon: Ken Camacho MD;  Location: [facility]     ENT SURGERY       HERNIA REPAIR       LARYNGOSCOPY, EXCISE VOCAL CORD LESION MICROSCOPIC, COMBINED Left 7/1/2021    Procedure: MICROLARYNGOSCOPY, LEFT TRUE VOCAL CORD INJECTION WITH PROLARYN;  Surgeon: Kyree Bearden MD;  Location: [facility]     PHACOEMULSIFICATION WITH STANDARD INTRAOCULAR LENS IMPLANT Right 3/10/2021    Procedure: Cataract removal with implant.;  Surgeon: Jamir Mac MD;  Location: [facility]     PHACOEMULSIFICATION WITH STANDARD INTRAOCULAR LENS IMPLANT Left 4/5/2021    Procedure: Cataract removal with implant.;  Surgeon: Jamir Mac MD;  Location: [facility]     PITUITARY EXCISION       tooth pulled 4/7  Right      Allergies   Allergen Reactions     Penicillins Hives and Swelling            Social History   Social History     Tobacco Use     Smoking status: Never Smoker     Smokeless tobacco: Never Used   Substance Use Topics     Alcohol use: Yes     Comment: rare     Drug use: Never      Past  medical history and social history were reviewed.    Physical Examination:  There were no vitals taken for this visit.  Wt Readings from Last 10 Encounters:   11/12/21 68 kg (150 lb)   11/11/21 69.9 kg (154 lb)   11/04/21 70 kg (154 lb 6.4 oz)   11/02/21 85.9 kg (189 lb 6.4 oz)   09/17/21 73 kg (161 lb)   09/01/21 72.6 kg (160 lb)   08/11/21 72.6 kg (160 lb)   08/02/21 72.8 kg (160 lb 9.6 oz)   07/12/21 73 kg (161 lb)   07/01/21 73 kg (161 lb)     Video physical exam  General: Patient appears well in no acute distress.   Skin: No visualized rash or lesions on visualized skin  Eyes: EOMI, no erythema, sclera icterus or discharge noted  Resp: Appears to be breathing comfortably without accessory muscle usage, speaking in full sentences, no cough  MSK: Appears to have normal range of motion based on visualized movements  Neurologic: No apparent tremors, facial movements symmetric  Psych: affect normal, alert and oriented    The rest of a comprehensive physical examination is deferred due to PHE (public health emergency) video restrictions    Laboratory Data:  Results for MARISOL XIE (MRN [id number]) as of 11/30/2021 09:19   11/29/2021 10:53   Sodium 141   Potassium 4.6   Chloride 106   Carbon Dioxide 29   Urea Nitrogen 20   Creatinine 0.98   GFR Estimate 76   Calcium 9.5   Anion Gap 6   Magnesium 2.3   Phosphorus 3.2   Albumin 3.2 (L)   Protein Total 7.0   Bilirubin Total 0.5   Alkaline Phosphatase 442 (H)    (H)    (H)     Results for MARISOL XIE (MRN [id number]) as of 11/30/2021 09:19   11/22/2021 11:21   WBC 12.8 (H)   Hemoglobin 13.2 (L)   Hematocrit 40.9   Platelet Count 292   RBC Count 4.78   MCV 86   MCH 27.6   MCHC 32.3   RDW 14.6   % Neutrophils 65   % Lymphocytes 19   % Monocytes 12   % Eosinophils 0   % Basophils 2   Absolute Basophils 0.2   Absolute Eosinophils 0.0   Absolute Immature Granulocytes 0.3 (H)   Absolute Lymphocytes 2.5   Absolute Monocytes 1.6 (H)   % Immature Granulocytes 2    Absolute Neutrophils 8.3   Absolute NRBCs 0.0   NRBCs per 100 WBC 0       Assessment and Plan:  1. 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 High PD-L1. Was on Keytruda but had progression, now on Doxil + Ifos started 10/26/21.     Did not tolerate well-had significant mucositis, poor PO intake, nausea, and electrolyte derrangements. He has had less cancer pain and CT with stable disease. Treatment was deferred a week. Now feeling great and ready for cycle 2.      Will admit tomorrow for cycle 2 with dose reduction of Doxil to 70mg and Ifos by 10%.  Will plan for MARLEEN visit after discharge given issues last cycle.     LFT elevation is likely from statin- will hold. Did confirm with Dr. Mariella HOGAN to go ahead with treatment with LFT elevation and will monitor for improvement with holding statin.      2. GI  Dyspepsia: Continue Omeprazole back to 20mg BID. Continue Tums PRN     CINV: Continue Zofran and can take scheduled if needed. Continue Compazine and Lorazepam PRN. Continue Emend inpatient on day 1 and day 6.      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 Cal Saba endocrine.     Hypothyroidism continue Synthroid 75mcg daily.      4. Derm  Rosacea: Long standing issue, continue Metrogel PRN. No new skin concerns.      5. MSK  Left shoulder/back/chest wall pain 2/2 tumor, following with palliative. Continue topical Bengay and PO Celebrex BID. Pain improved.    Monitor tingling/neuropathy.      6. ENT  Hoarseness: Thought 2/2 mediastinal mass vs irritation from prior biopsy. Following with ENT, s/p vocal cord infection 7/1/21. Will see our voice clinic in Dec due to ongoing issues. This is improved.     Mucositis: Significant, 2/2 Doxil. Now resolved. Discussed dose reduction, ice mouth during infusion, scheduled salt/soda rinses, Nystatin at first signs of thrush, and MMW PRN. Biotene for dry mouth.      7. Pulm/Cards  Dyspnea with  talking, prior work-up with CT PE negative for PE, infection, pneumonitis; troponin negative; COVID negative. Sating well on RA. Agree with speech pathology thoughts on laryngeal dysfunction. Symptoms improved.       Continue Guaifenesin-Coedine PRN for cough, much improved.      Saw cardiology, echo WNL. Has mild CAD, recommended to start on statin. LFT are elevated. Will HOLD statin for now and monitor for improvement.      8. FEN  Hypokalemia: 2/2 Ifos and poor PO intake. Now WNL.      Hypophosphatemia: 2/2 ifos and poor PO intake. Now WNL     Poor PO intake: 2/2 mucositis and nausea as above. Improved now, good appetite and eating well.      9. Psych  Anxiety/depression: Hx of issues, recurrent issue when he was feeling poorly from treatment. Now that he is physically doing better his mood is much improved. Previously placed health psych referral.    35 minutes spent on the date of the encounter doing chart review, review of test results, interpretation of tests, patient visit, documentation and discussion with other provider(s)     Maynor Rios PA-C  Department of Hematology and Oncology  [facility]         Again, thank you for allowing me to participate in the care of your patient.        Sincerely,        GERALDO Mullins

## 2021-12-01 ENCOUNTER — HOSPITAL ENCOUNTER (INPATIENT)
Facility: CLINIC | Age: 71
LOS: 7 days | Discharge: HOME | DRG: 847 | End: 2021-12-08
Attending: INTERNAL MEDICINE | Admitting: STUDENT IN AN ORGANIZED HEALTH CARE EDUCATION/TRAINING PROGRAM
Payer: MEDICARE

## 2021-12-01 DIAGNOSIS — C49.9 SPINDLE CELL SARCOMA (H): ICD-10-CM

## 2021-12-01 DIAGNOSIS — D70.1 CHEMOTHERAPY-INDUCED NEUTROPENIA (H): Primary | ICD-10-CM

## 2021-12-01 DIAGNOSIS — T45.1X5A CHEMOTHERAPY-INDUCED NAUSEA: ICD-10-CM

## 2021-12-01 DIAGNOSIS — E87.6 HYPOKALEMIA: ICD-10-CM

## 2021-12-01 DIAGNOSIS — C49.9 SARCOMA (H): ICD-10-CM

## 2021-12-01 DIAGNOSIS — E83.39 HYPOPHOSPHATEMIA: ICD-10-CM

## 2021-12-01 DIAGNOSIS — C45.9 MESOTHELIOMA, MALIGNANT (H): ICD-10-CM

## 2021-12-01 DIAGNOSIS — R11.0 CHEMOTHERAPY-INDUCED NAUSEA: ICD-10-CM

## 2021-12-01 DIAGNOSIS — T45.1X5A CHEMOTHERAPY-INDUCED NEUTROPENIA (H): Primary | ICD-10-CM

## 2021-12-01 LAB
ALBUMIN SERPL-MCNC: 3.5 G/DL (ref 3.4–5)
ALP SERPL-CCNC: 453 U/L (ref 40–150)
ALT SERPL W P-5'-P-CCNC: 203 U/L (ref 0–70)
ANION GAP SERPL CALCULATED.3IONS-SCNC: 6 MMOL/L (ref 3–14)
AST SERPL W P-5'-P-CCNC: 92 U/L (ref 0–45)
BASOPHILS # BLD AUTO: 0.1 10E3/UL (ref 0–0.2)
BASOPHILS NFR BLD AUTO: 1 %
BILIRUB SERPL-MCNC: 0.4 MG/DL (ref 0.2–1.3)
BUN SERPL-MCNC: 22 MG/DL (ref 7–30)
CALCIUM SERPL-MCNC: 9.9 MG/DL (ref 8.5–10.1)
CHLORIDE BLD-SCNC: 106 MMOL/L (ref 94–109)
CO2 SERPL-SCNC: 27 MMOL/L (ref 20–32)
CREAT SERPL-MCNC: 0.91 MG/DL (ref 0.66–1.25)
EOSINOPHIL # BLD AUTO: 0.1 10E3/UL (ref 0–0.7)
EOSINOPHIL NFR BLD AUTO: 1 %
ERYTHROCYTE [DISTWIDTH] IN BLOOD BY AUTOMATED COUNT: 15.8 % (ref 10–15)
FIBRINOGEN PPP-MCNC: 522 MG/DL (ref 170–490)
GFR SERPL CREATININE-BSD FRML MDRD: 83 ML/MIN/1.73M2
GLUCOSE BLD-MCNC: 124 MG/DL (ref 70–99)
HCT VFR BLD AUTO: 43 % (ref 40–53)
HGB BLD-MCNC: 13.3 G/DL (ref 13.3–17.7)
IMM GRANULOCYTES # BLD: 0.2 10E3/UL
IMM GRANULOCYTES NFR BLD: 2 %
INR PPP: 1.14 (ref 0.85–1.15)
LYMPHOCYTES # BLD AUTO: 1.5 10E3/UL (ref 0.8–5.3)
LYMPHOCYTES NFR BLD AUTO: 11 %
MCH RBC QN AUTO: 27.5 PG (ref 26.5–33)
MCHC RBC AUTO-ENTMCNC: 30.9 G/DL (ref 31.5–36.5)
MCV RBC AUTO: 89 FL (ref 78–100)
MONOCYTES # BLD AUTO: 1.3 10E3/UL (ref 0–1.3)
MONOCYTES NFR BLD AUTO: 10 %
NEUTROPHILS # BLD AUTO: 10.3 10E3/UL (ref 1.6–8.3)
NEUTROPHILS NFR BLD AUTO: 75 %
NRBC # BLD AUTO: 0 10E3/UL
NRBC BLD AUTO-RTO: 0 /100
PHOSPHATE SERPL-MCNC: 2.6 MG/DL (ref 2.5–4.5)
PLATELET # BLD AUTO: 299 10E3/UL (ref 150–450)
POTASSIUM BLD-SCNC: 4.4 MMOL/L (ref 3.4–5.3)
PROT SERPL-MCNC: 7.3 G/DL (ref 6.8–8.8)
RBC # BLD AUTO: 4.84 10E6/UL (ref 4.4–5.9)
SARS-COV-2 RNA RESP QL NAA+PROBE: NEGATIVE
SODIUM SERPL-SCNC: 139 MMOL/L (ref 133–144)
WBC # BLD AUTO: 13.5 10E3/UL (ref 4–11)

## 2021-12-01 PROCEDURE — 272N000451 HC KIT SHRLOCK 5FR POWER PICC DOUBLE LUMEN

## 2021-12-01 PROCEDURE — 250N000009 HC RX 250: Performed by: PHYSICIAN ASSISTANT

## 2021-12-01 PROCEDURE — 250N000013 HC RX MED GY IP 250 OP 250 PS 637: Performed by: STUDENT IN AN ORGANIZED HEALTH CARE EDUCATION/TRAINING PROGRAM

## 2021-12-01 PROCEDURE — 99223 1ST HOSP IP/OBS HIGH 75: CPT | Mod: AI | Performed by: STUDENT IN AN ORGANIZED HEALTH CARE EDUCATION/TRAINING PROGRAM

## 2021-12-01 PROCEDURE — 250N000013 HC RX MED GY IP 250 OP 250 PS 637: Performed by: PHYSICIAN ASSISTANT

## 2021-12-01 PROCEDURE — 85025 COMPLETE CBC W/AUTO DIFF WBC: CPT | Performed by: PHYSICIAN ASSISTANT

## 2021-12-01 PROCEDURE — 250N000011 HC RX IP 250 OP 636: Performed by: PHYSICIAN ASSISTANT

## 2021-12-01 PROCEDURE — 36569 INSJ PICC 5 YR+ W/O IMAGING: CPT

## 2021-12-01 PROCEDURE — 85384 FIBRINOGEN ACTIVITY: CPT | Performed by: PHYSICIAN ASSISTANT

## 2021-12-01 PROCEDURE — 80053 COMPREHEN METABOLIC PANEL: CPT | Performed by: PHYSICIAN ASSISTANT

## 2021-12-01 PROCEDURE — 84100 ASSAY OF PHOSPHORUS: CPT | Performed by: PHYSICIAN ASSISTANT

## 2021-12-01 PROCEDURE — 120N000002 HC R&B MED SURG/OB UMMC

## 2021-12-01 PROCEDURE — 272N000019 HC KIT OPEN ENDED DOUBLE LUMEN

## 2021-12-01 PROCEDURE — 36415 COLL VENOUS BLD VENIPUNCTURE: CPT | Performed by: PHYSICIAN ASSISTANT

## 2021-12-01 PROCEDURE — U0005 INFEC AGEN DETEC AMPLI PROBE: HCPCS | Performed by: PHYSICIAN ASSISTANT

## 2021-12-01 PROCEDURE — 85610 PROTHROMBIN TIME: CPT | Performed by: PHYSICIAN ASSISTANT

## 2021-12-01 PROCEDURE — 258N000003 HC RX IP 258 OP 636: Performed by: PHYSICIAN ASSISTANT

## 2021-12-01 PROCEDURE — 3E03305 INTRODUCTION OF OTHER ANTINEOPLASTIC INTO PERIPHERAL VEIN, PERCUTANEOUS APPROACH: ICD-10-PCS | Performed by: STUDENT IN AN ORGANIZED HEALTH CARE EDUCATION/TRAINING PROGRAM

## 2021-12-01 RX ORDER — PROCHLORPERAZINE MALEATE 5 MG
5 TABLET ORAL EVERY 6 HOURS PRN
Status: DISCONTINUED | OUTPATIENT
Start: 2021-12-01 | End: 2021-12-03

## 2021-12-01 RX ORDER — TRIAMCINOLONE ACETONIDE 1 MG/G
CREAM TOPICAL 2 TIMES DAILY PRN
Status: DISCONTINUED | OUTPATIENT
Start: 2021-12-01 | End: 2021-12-08 | Stop reason: HOSPADM

## 2021-12-01 RX ORDER — MEPERIDINE HYDROCHLORIDE 25 MG/ML
25 INJECTION INTRAMUSCULAR; INTRAVENOUS; SUBCUTANEOUS EVERY 30 MIN PRN
Status: DISCONTINUED | OUTPATIENT
Start: 2021-12-01 | End: 2021-12-08 | Stop reason: HOSPADM

## 2021-12-01 RX ORDER — ALBUTEROL SULFATE 0.83 MG/ML
2.5 SOLUTION RESPIRATORY (INHALATION)
Status: DISCONTINUED | OUTPATIENT
Start: 2021-12-01 | End: 2021-12-08 | Stop reason: HOSPADM

## 2021-12-01 RX ORDER — ONDANSETRON 8 MG/1
8 TABLET, FILM COATED ORAL EVERY 8 HOURS PRN
Status: DISCONTINUED | OUTPATIENT
Start: 2021-12-01 | End: 2021-12-08 | Stop reason: HOSPADM

## 2021-12-01 RX ORDER — HEPARIN SODIUM,PORCINE 10 UNIT/ML
5-20 VIAL (ML) INTRAVENOUS EVERY 24 HOURS
Status: DISCONTINUED | OUTPATIENT
Start: 2021-12-01 | End: 2021-12-08 | Stop reason: HOSPADM

## 2021-12-01 RX ORDER — PROCHLORPERAZINE MALEATE 5 MG
5 TABLET ORAL EVERY 6 HOURS PRN
Status: DISCONTINUED | OUTPATIENT
Start: 2021-12-01 | End: 2021-12-01

## 2021-12-01 RX ORDER — ONDANSETRON 4 MG/1
8 TABLET, ORALLY DISINTEGRATING ORAL EVERY 8 HOURS PRN
Status: DISCONTINUED | OUTPATIENT
Start: 2021-12-01 | End: 2021-12-08 | Stop reason: HOSPADM

## 2021-12-01 RX ORDER — ALBUTEROL SULFATE 90 UG/1
1-2 AEROSOL, METERED RESPIRATORY (INHALATION)
Status: DISCONTINUED | OUTPATIENT
Start: 2021-12-01 | End: 2021-12-08 | Stop reason: HOSPADM

## 2021-12-01 RX ORDER — HEPARIN SODIUM,PORCINE 10 UNIT/ML
5-20 VIAL (ML) INTRAVENOUS
Status: DISCONTINUED | OUTPATIENT
Start: 2021-12-01 | End: 2021-12-08 | Stop reason: HOSPADM

## 2021-12-01 RX ORDER — LIDOCAINE 40 MG/G
CREAM TOPICAL
Status: ACTIVE | OUTPATIENT
Start: 2021-12-01 | End: 2021-12-04

## 2021-12-01 RX ORDER — DIPHENHYDRAMINE HYDROCHLORIDE 50 MG/ML
50 INJECTION INTRAMUSCULAR; INTRAVENOUS
Status: DISCONTINUED | OUTPATIENT
Start: 2021-12-01 | End: 2021-12-08 | Stop reason: HOSPADM

## 2021-12-01 RX ORDER — CODEINE PHOSPHATE AND GUAIFENESIN 10; 100 MG/5ML; MG/5ML
10 SOLUTION ORAL EVERY 4 HOURS PRN
Status: DISCONTINUED | OUTPATIENT
Start: 2021-12-01 | End: 2021-12-08 | Stop reason: HOSPADM

## 2021-12-01 RX ORDER — HYDROCORTISONE 10 MG/1
20 TABLET ORAL DAILY
Status: DISCONTINUED | OUTPATIENT
Start: 2021-12-02 | End: 2021-12-08 | Stop reason: HOSPADM

## 2021-12-01 RX ORDER — AMOXICILLIN 250 MG
1-2 CAPSULE ORAL 2 TIMES DAILY PRN
Status: DISCONTINUED | OUTPATIENT
Start: 2021-12-01 | End: 2021-12-08 | Stop reason: HOSPADM

## 2021-12-01 RX ORDER — AMOXICILLIN 250 MG
2 CAPSULE ORAL 2 TIMES DAILY PRN
Status: DISCONTINUED | OUTPATIENT
Start: 2021-12-01 | End: 2021-12-08 | Stop reason: HOSPADM

## 2021-12-01 RX ORDER — EPINEPHRINE 1 MG/ML
0.3 INJECTION, SOLUTION, CONCENTRATE INTRAVENOUS EVERY 5 MIN PRN
Status: DISCONTINUED | OUTPATIENT
Start: 2021-12-01 | End: 2021-12-08 | Stop reason: HOSPADM

## 2021-12-01 RX ORDER — DEXTROSE MONOHYDRATE 50 MG/ML
10-20 INJECTION, SOLUTION INTRAVENOUS
Status: DISCONTINUED | OUTPATIENT
Start: 2021-12-08 | End: 2021-12-02

## 2021-12-01 RX ORDER — PANTOPRAZOLE SODIUM 40 MG/1
40 TABLET, DELAYED RELEASE ORAL 2 TIMES DAILY
Status: DISCONTINUED | OUTPATIENT
Start: 2021-12-01 | End: 2021-12-08 | Stop reason: HOSPADM

## 2021-12-01 RX ORDER — ONDANSETRON 8 MG/1
8 TABLET, FILM COATED ORAL EVERY 8 HOURS
Status: COMPLETED | OUTPATIENT
Start: 2021-12-01 | End: 2021-12-08

## 2021-12-01 RX ORDER — VITAMIN B COMPLEX
1000 TABLET ORAL DAILY
Status: DISCONTINUED | OUTPATIENT
Start: 2021-12-01 | End: 2021-12-08 | Stop reason: HOSPADM

## 2021-12-01 RX ORDER — HYDROCORTISONE 10 MG/1
10 TABLET ORAL DAILY
Status: DISCONTINUED | OUTPATIENT
Start: 2021-12-01 | End: 2021-12-08 | Stop reason: HOSPADM

## 2021-12-01 RX ORDER — POLYETHYLENE GLYCOL 3350 17 G/17G
17 POWDER, FOR SOLUTION ORAL DAILY PRN
Status: DISCONTINUED | OUTPATIENT
Start: 2021-12-01 | End: 2021-12-08 | Stop reason: HOSPADM

## 2021-12-01 RX ORDER — CELECOXIB 200 MG/1
200 CAPSULE ORAL 2 TIMES DAILY
Status: DISCONTINUED | OUTPATIENT
Start: 2021-12-01 | End: 2021-12-08 | Stop reason: HOSPADM

## 2021-12-01 RX ORDER — LORAZEPAM 0.5 MG/1
0.5 TABLET ORAL EVERY 4 HOURS PRN
Status: DISCONTINUED | OUTPATIENT
Start: 2021-12-01 | End: 2021-12-08 | Stop reason: HOSPADM

## 2021-12-01 RX ORDER — ACETAMINOPHEN 500 MG
500-1000 TABLET ORAL EVERY 6 HOURS PRN
Status: DISCONTINUED | OUTPATIENT
Start: 2021-12-01 | End: 2021-12-08 | Stop reason: HOSPADM

## 2021-12-01 RX ORDER — METHYLPREDNISOLONE SODIUM SUCCINATE 125 MG/2ML
125 INJECTION, POWDER, LYOPHILIZED, FOR SOLUTION INTRAMUSCULAR; INTRAVENOUS
Status: DISCONTINUED | OUTPATIENT
Start: 2021-12-01 | End: 2021-12-08 | Stop reason: HOSPADM

## 2021-12-01 RX ORDER — NALOXONE HYDROCHLORIDE 0.4 MG/ML
0.2 INJECTION, SOLUTION INTRAMUSCULAR; INTRAVENOUS; SUBCUTANEOUS
Status: DISCONTINUED | OUTPATIENT
Start: 2021-12-01 | End: 2021-12-08 | Stop reason: HOSPADM

## 2021-12-01 RX ORDER — ONDANSETRON 2 MG/ML
8 INJECTION INTRAMUSCULAR; INTRAVENOUS EVERY 8 HOURS PRN
Status: DISCONTINUED | OUTPATIENT
Start: 2021-12-01 | End: 2021-12-08 | Stop reason: HOSPADM

## 2021-12-01 RX ORDER — METRONIDAZOLE 7.5 MG/G
GEL TOPICAL 2 TIMES DAILY
Status: DISCONTINUED | OUTPATIENT
Start: 2021-12-01 | End: 2021-12-08 | Stop reason: HOSPADM

## 2021-12-01 RX ORDER — CALCIUM CARBONATE 500 MG/1
500 TABLET, CHEWABLE ORAL DAILY PRN
Status: DISCONTINUED | OUTPATIENT
Start: 2021-12-01 | End: 2021-12-08 | Stop reason: HOSPADM

## 2021-12-01 RX ADMIN — ENOXAPARIN SODIUM 40 MG: 40 INJECTION SUBCUTANEOUS at 20:09

## 2021-12-01 RX ADMIN — DOXORUBICIN HYDROCHLORIDE 70 MG: 2 INJECTABLE, LIPOSOMAL INTRAVENOUS at 20:54

## 2021-12-01 RX ADMIN — ONDANSETRON HYDROCHLORIDE 8 MG: 8 TABLET, FILM COATED ORAL at 20:09

## 2021-12-01 RX ADMIN — SALINE NASAL SPRAY 1 SPRAY: 1.5 SOLUTION NASAL at 21:03

## 2021-12-01 RX ADMIN — Medication 5 ML: at 22:27

## 2021-12-01 RX ADMIN — LIDOCAINE HYDROCHLORIDE ANHYDROUS 3 ML: 10 INJECTION, SOLUTION INFILTRATION at 18:32

## 2021-12-01 RX ADMIN — FOSAPREPITANT 150 MG: 150 INJECTION, POWDER, LYOPHILIZED, FOR SOLUTION INTRAVENOUS at 20:07

## 2021-12-01 RX ADMIN — METRONIDAZOLE: 7.5 GEL TOPICAL at 21:03

## 2021-12-01 RX ADMIN — CELECOXIB 200 MG: 200 CAPSULE ORAL at 20:11

## 2021-12-01 RX ADMIN — HYDROCORTISONE 10 MG: 10 TABLET ORAL at 20:12

## 2021-12-01 RX ADMIN — PANTOPRAZOLE SODIUM 40 MG: 40 TABLET, DELAYED RELEASE ORAL at 20:09

## 2021-12-01 ASSESSMENT — ACTIVITIES OF DAILY LIVING (ADL)
ADLS_ACUITY_SCORE: 4
ADLS_ACUITY_SCORE: 12
ADLS_ACUITY_SCORE: 4

## 2021-12-01 ASSESSMENT — MIFFLIN-ST. JEOR: SCORE: 1369.06

## 2021-12-01 NOTE — PROVIDER NOTIFICATION
DATE/TIME  (DOT-TD, DOT-NOW) CHEMO CHECK ACTIVITY (REGIMEN & DOSE CHECK, DAY, DOSE #, NAME OF CHEMO #1)  CHEMO DRUG #2  CHEMO DRUG #3 NAME OF RN #1 (USE DOT-ME HERE) NAME OF RN#2 (2ND RN TO LOG IN SEPARATELY)   12/1/2021  5:35  PM Doxil + Ifosfamide/mesna protocol check   Esthela Hearn, PETER Gomez, PETER     12/1/21  8:36 PM Dose 1 Doxil double check Dose 1 Ifos/mesna double check  Sara K Severson Swenson, RN BREANNA R PAAPE, RN     12/2/21  5987 Dose #2 ifos/mesna double check   Kasua M Vue, RN Sara K Severson Swenson, RN     12/03/21  9680   Dose #3 Ifos/Mesna Double Check   PETER Silva RN     12/04/21  11:49 PM   Dose #4 Ifos/Mesna Double Check   Maritza Argueta, RN   Jim Candelario RN     12/06/21  12:20 AM   Dose #5 Ifos/Mesna Double Check   Maritza Argueta, RN   (Lcd)

## 2021-12-01 NOTE — H&P
[facility]    Hematology / Oncology  Admission History & Physical     Date of Admission:  12/01/21  Date of Service: 12/01/21  Primary Hematologist/Oncologist: Dr. 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 Wolff. Patient presented to his PCP in 03/2021 with chest/left shoulder and back pain that led to imaging which revealed multiple lung mets, included a left pleural mass. There were difficulties in getting diagnostic biopsy; eventually, biopsy of the mediastinal mass (5/20/21) revealed a poorly characterized malignant spindle cell neoplasm with high PD-L1 expression (90%), Ki-67 70%. 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 Insufficient material to send [facility]. Baseline imaging (6/21/21) revealed progression of lung mets and left-sided subdiaphragmatic mass, stable liver lesions. He was seen by ENT for hoarseness, which was attributed to left true vocal fold motion impairment from mediastinal mass. Given high PD-L1 expression, he was started on Keytruda (C1=6/21/21). Interval imaging (CT 8/2/21) revealed positive response to treatment. He received 6 cycles total, most recently C6=10/4/21. Unfortunately, restaging imaging (10/18/21) revealed interval increase in size of the LUQ/splenic mass; however, the mediastinal and left pleural mass were stable to slightly decreased in size. He met with Dr. Wolff, who recommended a change in therapy to Doxil + ifosfamide. He was  admitted on 10/26/21 to receive Cycle #1 of Doxil/ifos which he tolerated reasonably well with no neurotoxicities, but did have moderate chemotherapy-induced nausea. After discharge he had significant mucositis, poor PO intake, nausea, and lyte derangements. Given adverse side effects, Cycle 2 was delayed by 1 week and doxil was dose reduced to 70mg (previously given 80mg, ie 45mg/m2), Ifosfamide reduced by 10% (1500mg/m2 ? 1350mg/m2).   - PICC ordered on admission; removed on discharge.  - Next restaging imaging currently arranged for 11/22/21  - OK to start chemotherapy in the setting of ALT/AST elevation per Dr. Wolff                                                        Treatment Plan: Doxil + ifosfamide (M4O7=72/1/21).                            - Doxil 70mg IV once - D1                             - Ifosfamide/Mesna 1350 mg/m2 (2510 mg) CIVI - D1-6                             - Mesna 1350 mg/m2 (2510 mg) IV over 18 hrs - starting D7                             - Neulasta injection - D8, will request to be given on Day 9 at [facility]     - Emend 150mg once Day 1, will repeat on Day 6 if needed for nausea prior to discharge     # Cancer-related pain  - Continue PTA Celebrex 200 mg BID   - Continue PTA topical Bengay PRN at bedtime to left back      # Normocytic Anemia, mild  Noted intermittently. Likely related to chemo, hydration, frequent blood draws.  - Monitor  - Transfuse for Hgb <7, will sign blood consent if indicated     FEN/GI  # History of transaminitis  , , TBili WNL as an outpatient on 11/29/21. PTA statin was subsequently held. Per outpatient notes he did not have any abdominal pain or other symptoms concerning for viral hepatitis. These lab values were reviewed with Dr. Wolff, and it was discussed that this was likely due to his statin and OK to proceed with chemotherapy as planned above.   - Continue to hold PTA statin  - Follow LFTs daily     # History of chemotherapy-induced  nausea  - Scheduled Zofran Q8H  - PRN compazine and Ativan  - Emend 150 mg IV x1 dose on 12/1; would consider additional dose on Day 6 if needed for nausea prior to discharge  - Could consider additional anti-emetics (i.e. Zyprexa, etc.) as indicated     # GERD  - Continue PTA omeprazole  - TUMS and Pepto Bismol available PRN      # History of Hypophosphatemia  Secondary to Ifosfamide. Required additional phos replacement after discharge from Cycle 1 Doxil/Ifos.  - Follow Phos daily while inpatient, replete per lyte criteria and consider discharge with neutraphos if indicated  - Follow phos with weekly labs outpatient     CARDS   # CAD   Follows with Dr. Gigi Vernon at [facility]. He was noted to have coronary calcium on chest CT. CTA 9/29/21 with moderate mid LAD stenosis. Small to moderate caliber ramus branch with severe diffuse disease.  Echo completed 9/29/21 with normal EF and no valvular dysfunction. Repeat ECHO on 10/27 with EF 60-65%, early diastolic dysfunction but otherwise no change from previous.  - PTA rosuvastatin 40 mg daily HELD for transaminitis, continue to hold on discharge  - Encourage outpatient cardiology follow-up as recommended     # History of elevated blood pressures  BPs ranging from normotensive to hypertensive (with SBP 140s-160) during previous admission. He states that he runs higher at baseline (though this is based on clinic assessments as he does not typically monitor his BP at home). Appears recent intermittent clinic BPs sit at 140s-150s/80s-90s when seen in person, otherwise many of the visits are currently virtual. He is not on any antihypertensive medications PTA. DDx includes mild volume overload vs. pain/nausea vs. undiagnosed essential hypertension.  - Monitor  - Would recommend outpatient PCP follow-up for further discussion of BP monitoring and management     ENDO  # Panhypopituitarism  # Macroadenoma of the pituitary gland  Diagnosed in 2010. Found to  have a pituitary macroadenoma, 1.9 cm in largest dimension. Underwent hypophysectomy on 8/23/10. Subsequent presumptive pituitary deficiency, on empiric meds for adrenal insufficiency and thyroid. Previously followed by Dr. Bill Mccall; plans to establish care with Dr. Jamir Grant on 11/11/21.  - Continue levothyroxine  - Continue hydrocortisone 15 mg qAM and 10 mg qPM  - Continue endocrinology follow-up as scheduled     ENT  # Vocal cord dysfunction  Manifested as hoarseness. Seen by ENT (Dr. Kyree Bearden) on 6/21/21 and noted to have left true vocal fold motion impairment from mediastinal mass. Underwent injection of ProLaryn (filler/bulking agent) on 7/1/21, which reportedly has been minimally beneficial. Seen in follow-up by Dr. Bearden on 9/1/21 and noted to have minimal improvement; he was then referred to the [facility] ([facility]).   - No acute inpatient management issues  - Continue outpatient ENT follow-up as previously scheduled (next scheduled for 12/9/21)     DERM  # Rosacea  - Continue PTA metronidazole gel    PSYCH  # History of anxiety/depression  When physically feeling better his mood is improved, denies difficulty coping on admission.   - Previously placed health psych    FEN:  - Encouraged PO fluids, bolus PRN  - PRN lyte replacement  - RDAT     Prophy/Misc:  - VTE: ppx lovenox 40mg daily during admission   - GI/PUD: PTA omeprazole, Tums PRN  - Bowels: Senna and Miralax PRN  - Activity: Consider consulting PT if indicated  - COVID vaccination status: Completed 2 doses of Moderna (2nd dose given 4/15/21). [facility] on [facility]     Code: Full code  Disposition: Admitted to Oncology Team 3 service for scheduled chemotherapy. Anticipate discharge on 12/8 barring any other clinical complications  Follow up: Day team will request outpatient neulasta and labs      Chief Complaint   Sarcoma  - History obtained from the patient and through chart review.    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     On interview with the patient this evening, he is feeling generally well.  He was feeling well enough to walk up to the cafeteria and get some food for dinner.  I met with the patient and his wife at bedside this evening.  The patient is generally feeling fairly well.  Most notably he reports that his mucositis has improved greatly over the last 4 days.  He still has diminished taste however.  He denies chest pain, shortness of breath, abdominal pain, constipation, diarrhea.    On admission he is noted to have mild hypertension with blood pressure 154/90, temperature 96, heart rate 84, respiratory rate 20, SPO2 96% on room air.    Basic laboratory work on admission shows a normal chemistry panel except for elevated alkaline phosphatase and ALT and AST consistent with previous values.  Has a mild leukocytosis to 13.5 thousand but otherwise normal hemoglobin and platelet count.      Past Medical History    I have reviewed this patient's medical history and updated it with pertinent information if needed.   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  Past Surgical History:   Procedure Laterality Date     COLONOSCOPY N/A 12/17/2020    Procedure: COLONOSCOPY;  Surgeon: Ken Camacho MD;  Location: [facility]     ENT SURGERY       HERNIA REPAIR       LARYNGOSCOPY, EXCISE VOCAL CORD LESION MICROSCOPIC,  COMBINED Left 7/1/2021    Procedure: MICROLARYNGOSCOPY, LEFT TRUE VOCAL CORD INJECTION WITH PROLARYN;  Surgeon: Kyree Bearden MD;  Location: [facility]     PHACOEMULSIFICATION WITH STANDARD INTRAOCULAR LENS IMPLANT Right 3/10/2021    Procedure: Cataract removal with implant.;  Surgeon: Jamir Mac MD;  Location: [facility]     PHACOEMULSIFICATION WITH STANDARD INTRAOCULAR LENS IMPLANT Left 4/5/2021    Procedure: Cataract removal with implant.;  Surgeon: Jamir Mac MD;  Location: [facility]     PITUITARY EXCISION       tooth pulled 4/7  Right        Prior to Admission Medications   Cannot display prior to admission medications because the patient has not been admitted in this contact.     Allergies   Allergies   Allergen Reactions     Penicillins Hives and Swelling              Social History   Social History     Socioeconomic History     Marital status:      Spouse name: Not on file     Number of children: Not on file     Years of education: Not on file     Highest education level: Not on file   Occupational History     Not on file   Tobacco Use     Smoking status: Never Smoker     Smokeless tobacco: Never Used   Substance and Sexual Activity     Alcohol use: Yes     Comment: rare     Drug use: Never     Sexual activity: Not on file   Other Topics Concern     Parent/sibling w/ CABG, MI or angioplasty before 65F 55M? Not Asked   Social History Narrative     Not on file     Social Determinants of Health     Financial Resource Strain: Low Risk      Difficulty of Paying Living Expenses: Not very hard   Food Insecurity: No Food Insecurity     Worried About Running Out of Food in the Last Year: Never true     Ran Out of Food in the Last Year: Never true   Transportation Needs: No Transportation Needs     Lack of Transportation (Medical): No     Lack of Transportation (Non-Medical): No   Physical Activity: Inactive     Days of Exercise per Week: 0 days     Minutes of Exercise per Session: 0 min    Stress: Not on file   Social Connections: Moderately Integrated     Frequency of Communication with Friends and Family: Twice a week     Frequency of Social Gatherings with Friends and Family: Twice a week     Attends Latter day Services: Never     Active Member of Clubs or Organizations: Yes     Attends Club or Organization Meetings: 1 to 4 times per year     Marital Status:    Intimate Partner Violence: Unknown     Fear of Current or Ex-Partner: No     Emotionally Abused: No     Physically Abused: Not on file     Sexually Abused: No   Housing Stability: Not on file       Family History   I have reviewed this patient's family history and updated it with pertinent information if needed.   Family History   Problem Relation Age of Onset     Lupus Mother      ALS Father      Rheumatoid Arthritis Sister        Review of Systems   A comprehensive ROS was performed with the patient and was found to be negative or non-contributory with the exception of that noted in the HPI above.    Physical Exam         CONSTITUTIONAL: Alert and interactive.  Appears comfortable, appears stated age.  HEENT: NC/AT, EOMI, anicteric sclera, oropharynx is pink and moist without erythema/exudates/lesions/thrush.  CARDIOVASCULAR: RRR. Normal S1/S2. No murmurs. 2+ equal and bilateral radial and pedal pulses.   RESPIRATORY: CTAB. No wheezes appreciated. Normal respiratory effort on ambient air.  GASTROINTESTINAL: Soft, non-tender, non-distended.  MUSCULOSKELETAL: No joint swelling or tenderness.  EXTREMITIES: No lower extremity edema.   SKIN: Warm and intact. No concerning lesions or rashes on exposed skin surfaces. No jaundice.  NEUROLOGIC: Alert and fully oriented, appropriately responsive during interview.   VASCULAR ACCESS: C/D/I, non-tender.    Data   I have personally reviewed the following labs/imaging:  Results for orders placed or performed during the hospital encounter of 12/01/21 (from the past 24 hour(s))   Asymptomatic  COVID-19 Virus (Coronavirus) by PCR Nasopharyngeal    Specimen: Nasopharyngeal; Swab   Result Value Ref Range    SARS CoV2 PCR Negative Negative, Testing sent to reference lab. Results will be returned via unsolicited result    Narrative    Testing was performed using the Xpert Xpress SARS-CoV-2 Assay on the  Cepheid Gene-Xpert Instrument Systems. Additional information about  this Emergency Use Authorization (EUA) assay can be found via the Lab  Guide. This test should be ordered for the detection of SARS-CoV-2 in  individuals who meet SARS-CoV-2 clinical and/or epidemiological  criteria. Test performance is unknown in asymptomatic patients. This  test is for in vitro diagnostic use under the FDA EUA for  laboratories certified under CLIA to perform high complexity testing.  This test has not been FDA cleared or approved. A negative result  does not rule out the presence of PCR inhibitors in the specimen or  target RNA in concentration below the limit of detection for the  assay. The possibility of a false negative should be considered if  the patient's recent exposure or clinical presentation suggests  COVID-19. This test was validated by the [facility]. This laboratory is certified under  the Clinical Laboratory Improvement Amendments of 1988 (CLIA-88) as  qualified to perform high complexity laboratory testing.     INR   Result Value Ref Range    INR 1.14 0.85 - 1.15   Fibrinogen activity   Result Value Ref Range    Fibrinogen Activity 522 (H) 170 - 490 mg/dL   CBC with Platelets & Differential    Narrative    The following orders were created for panel order CBC with Platelets & Differential.  Procedure                               Abnormality         Status                     ---------                               -----------         ------                     CBC with platelets and d...[[id number]]  Abnormal            Final result                 Please  view results for these tests on the individual orders.   Comprehensive metabolic panel   Result Value Ref Range    Sodium 139 133 - 144 mmol/L    Potassium 4.4 3.4 - 5.3 mmol/L    Chloride 106 94 - 109 mmol/L    Carbon Dioxide (CO2) 27 20 - 32 mmol/L    Anion Gap 6 3 - 14 mmol/L    Urea Nitrogen 22 7 - 30 mg/dL    Creatinine 0.91 0.66 - 1.25 mg/dL    Calcium 9.9 8.5 - 10.1 mg/dL    Glucose 124 (H) 70 - 99 mg/dL    Alkaline Phosphatase 453 (H) 40 - 150 U/L    AST 92 (H) 0 - 45 U/L     (H) 0 - 70 U/L    Protein Total 7.3 6.8 - 8.8 g/dL    Albumin 3.5 3.4 - 5.0 g/dL    Bilirubin Total 0.4 0.2 - 1.3 mg/dL    GFR Estimate 83 >60 mL/min/1.73m2   Phosphorus   Result Value Ref Range    Phosphorus 2.6 2.5 - 4.5 mg/dL   CBC with platelets and differential   Result Value Ref Range    WBC Count 13.5 (H) 4.0 - 11.0 10e3/uL    RBC Count 4.84 4.40 - 5.90 10e6/uL    Hemoglobin 13.3 13.3 - 17.7 g/dL    Hematocrit 43.0 40.0 - 53.0 %    MCV 89 78 - 100 fL    MCH 27.5 26.5 - 33.0 pg    MCHC 30.9 (L) 31.5 - 36.5 g/dL    RDW 15.8 (H) 10.0 - 15.0 %    Platelet Count 299 150 - 450 10e3/uL    % Neutrophils 75 %    % Lymphocytes 11 %    % Monocytes 10 %    % Eosinophils 1 %    % Basophils 1 %    % Immature Granulocytes 2 %    NRBCs per 100 WBC 0 <1 /100    Absolute Neutrophils 10.3 (H) 1.6 - 8.3 10e3/uL    Absolute Lymphocytes 1.5 0.8 - 5.3 10e3/uL    Absolute Monocytes 1.3 0.0 - 1.3 10e3/uL    Absolute Eosinophils 0.1 0.0 - 0.7 10e3/uL    Absolute Basophils 0.1 0.0 - 0.2 10e3/uL    Absolute Immature Granulocytes 0.2 <=0.4 10e3/uL    Absolute NRBCs 0.0 10e3/uL   Double Lumen PICC Placement    Narrative    Sultana Mcfarland RN     12/1/2021  6:28 PM  [facility]    Double Lumen PICC Placement    Date/Time: 12/1/2021 6:26 PM  Performed by: Sultana Mcfarland, RN  Authorized by: Chiquita Norris PA-C   Indications: chemotherapy.      UNIVERSAL PROTOCOL   Site Marked: Yes  Prior Images  Obtained and Reviewed:  Yes  Required items: Required blood products, implants, devices and special   equipment available    Patient identity confirmed:  Verbally with patient and arm band  NA - No sedation, light sedation, or local anesthesia  Confirmation Checklist:  Patient's identity using two indicators, relevant   allergies, procedure was appropriate and matched the consent or emergent   situation and correct equipment/implants were available  Time out: Immediately prior to the procedure a time out was called    Universal Protocol: the Joint Commission Universal Protocol was followed    Preparation: Patient was prepped and draped in usual sterile fashion       ANESTHESIA    Anesthesia: See MAR for details  Local Anesthetic:  Lidocaine 1% without epinephrine  Anesthetic Total (mL):  3      SEDATION    Patient Sedated: No        Preparation: skin prepped with ChloraPrep  Skin prep agent: skin prep agent completely dried prior to procedure  Sterile barriers: maximum sterile barriers were used: cap, mask, sterile   gown, sterile gloves, and large sterile sheet  Hand hygiene: hand hygiene performed prior to central venous catheter   insertion  Type of line used: Power PICC  Catheter type: double lumen  Lumen type: non-valved  Catheter size: 5 Fr  Brand: Managed Methods  Lot number: [id number]  Placement method: venipuncture, MST, ultrasound and tip confirmation   system (Verified by Bard 3CG)  Number of attempts: 1  Successful placement: yes  Orientation: right  Location: basilic vein (vein diameter- 0.61cm)  Site rationale: patient's choice  Arm circumference: adults 10 cm  Extremity circumference: 27  Visible catheter length: 1  Total catheter length: 38  Dressing and securement: alcohol impregnated caps, blood cleaned with CHG,   chlorhexidine disc applied, site cleaned, statlock and sterile dressing   applied  Post procedure assessment: blood return through all ports and free fluid   flow (Bard 3CG)  PROCEDURE   Patient  Tolerance:  Patient tolerated the procedure well with no immediate   complicationsDescribe Procedure: PICC placement verified by [facility] 3CG. Tip   at satisfactory position. PICC okay to use.

## 2021-12-02 ENCOUNTER — PATIENT OUTREACH (OUTPATIENT)
Dept: CARE COORDINATION | Facility: CLINIC | Age: 71
End: 2021-12-02

## 2021-12-02 DIAGNOSIS — T45.1X5A CHEMOTHERAPY-INDUCED NEUTROPENIA (H): Primary | ICD-10-CM

## 2021-12-02 DIAGNOSIS — D70.1 CHEMOTHERAPY-INDUCED NEUTROPENIA (H): Primary | ICD-10-CM

## 2021-12-02 LAB
ABO/RH(D): NORMAL
ALBUMIN SERPL-MCNC: 2.7 G/DL (ref 3.4–5)
ALP SERPL-CCNC: 351 U/L (ref 40–150)
ALT SERPL W P-5'-P-CCNC: 147 U/L (ref 0–70)
ANION GAP SERPL CALCULATED.3IONS-SCNC: 4 MMOL/L (ref 3–14)
ANTIBODY SCREEN: NEGATIVE
AST SERPL W P-5'-P-CCNC: 52 U/L (ref 0–45)
BASOPHILS # BLD AUTO: 0.1 10E3/UL (ref 0–0.2)
BASOPHILS NFR BLD AUTO: 1 %
BILIRUB SERPL-MCNC: 0.4 MG/DL (ref 0.2–1.3)
BUN SERPL-MCNC: 20 MG/DL (ref 7–30)
CALCIUM SERPL-MCNC: 9.3 MG/DL (ref 8.5–10.1)
CHLORIDE BLD-SCNC: 108 MMOL/L (ref 94–109)
CO2 SERPL-SCNC: 28 MMOL/L (ref 20–32)
CREAT SERPL-MCNC: 0.81 MG/DL (ref 0.66–1.25)
EOSINOPHIL # BLD AUTO: 0.1 10E3/UL (ref 0–0.7)
EOSINOPHIL NFR BLD AUTO: 1 %
ERYTHROCYTE [DISTWIDTH] IN BLOOD BY AUTOMATED COUNT: 15.7 % (ref 10–15)
GFR SERPL CREATININE-BSD FRML MDRD: 88 ML/MIN/1.73M2
GLUCOSE BLD-MCNC: 96 MG/DL (ref 70–99)
HCT VFR BLD AUTO: 36.9 % (ref 40–53)
HGB BLD-MCNC: 11.4 G/DL (ref 13.3–17.7)
IMM GRANULOCYTES # BLD: 0.2 10E3/UL
IMM GRANULOCYTES NFR BLD: 1 %
LYMPHOCYTES # BLD AUTO: 1.8 10E3/UL (ref 0.8–5.3)
LYMPHOCYTES NFR BLD AUTO: 14 %
MAGNESIUM SERPL-MCNC: 2.4 MG/DL (ref 1.6–2.3)
MCH RBC QN AUTO: 27.2 PG (ref 26.5–33)
MCHC RBC AUTO-ENTMCNC: 30.9 G/DL (ref 31.5–36.5)
MCV RBC AUTO: 88 FL (ref 78–100)
MONOCYTES # BLD AUTO: 1.4 10E3/UL (ref 0–1.3)
MONOCYTES NFR BLD AUTO: 11 %
NEUTROPHILS # BLD AUTO: 9 10E3/UL (ref 1.6–8.3)
NEUTROPHILS NFR BLD AUTO: 72 %
NRBC # BLD AUTO: 0 10E3/UL
NRBC BLD AUTO-RTO: 0 /100
PHOSPHATE SERPL-MCNC: 2.7 MG/DL (ref 2.5–4.5)
PLATELET # BLD AUTO: 236 10E3/UL (ref 150–450)
POTASSIUM BLD-SCNC: 3.9 MMOL/L (ref 3.4–5.3)
PROT SERPL-MCNC: 6.3 G/DL (ref 6.8–8.8)
RBC # BLD AUTO: 4.19 10E6/UL (ref 4.4–5.9)
SODIUM SERPL-SCNC: 140 MMOL/L (ref 133–144)
SPECIMEN EXPIRATION DATE: NORMAL
WBC # BLD AUTO: 12.6 10E3/UL (ref 4–11)

## 2021-12-02 PROCEDURE — 250N000013 HC RX MED GY IP 250 OP 250 PS 637: Performed by: PHYSICIAN ASSISTANT

## 2021-12-02 PROCEDURE — 99233 SBSQ HOSP IP/OBS HIGH 50: CPT | Performed by: INTERNAL MEDICINE

## 2021-12-02 PROCEDURE — 36592 COLLECT BLOOD FROM PICC: CPT | Performed by: PHYSICIAN ASSISTANT

## 2021-12-02 PROCEDURE — 86900 BLOOD TYPING SEROLOGIC ABO: CPT | Performed by: PHYSICIAN ASSISTANT

## 2021-12-02 PROCEDURE — 250N000011 HC RX IP 250 OP 636: Performed by: PHYSICIAN ASSISTANT

## 2021-12-02 PROCEDURE — 84100 ASSAY OF PHOSPHORUS: CPT | Performed by: PHYSICIAN ASSISTANT

## 2021-12-02 PROCEDURE — 120N000002 HC R&B MED SURG/OB UMMC

## 2021-12-02 PROCEDURE — 258N000003 HC RX IP 258 OP 636: Performed by: PHYSICIAN ASSISTANT

## 2021-12-02 PROCEDURE — 82040 ASSAY OF SERUM ALBUMIN: CPT | Performed by: PHYSICIAN ASSISTANT

## 2021-12-02 PROCEDURE — 83735 ASSAY OF MAGNESIUM: CPT | Performed by: PHYSICIAN ASSISTANT

## 2021-12-02 PROCEDURE — 85004 AUTOMATED DIFF WBC COUNT: CPT | Performed by: PHYSICIAN ASSISTANT

## 2021-12-02 RX ORDER — HYDROCHLOROTHIAZIDE 12.5 MG/1
12.5 CAPSULE ORAL DAILY
Status: DISCONTINUED | OUTPATIENT
Start: 2021-12-02 | End: 2021-12-02

## 2021-12-02 RX ORDER — SALIVA STIMULANT COMB. NO.3
1-2 SPRAY, NON-AEROSOL (ML) MUCOUS MEMBRANE 4 TIMES DAILY PRN
Status: DISCONTINUED | OUTPATIENT
Start: 2021-12-02 | End: 2021-12-08 | Stop reason: HOSPADM

## 2021-12-02 RX ADMIN — CELECOXIB 200 MG: 200 CAPSULE ORAL at 08:39

## 2021-12-02 RX ADMIN — PANTOPRAZOLE SODIUM 40 MG: 40 TABLET, DELAYED RELEASE ORAL at 08:39

## 2021-12-02 RX ADMIN — ENOXAPARIN SODIUM 40 MG: 40 INJECTION SUBCUTANEOUS at 19:36

## 2021-12-02 RX ADMIN — Medication 5 ML: at 19:43

## 2021-12-02 RX ADMIN — Medication 5 ML: at 00:08

## 2021-12-02 RX ADMIN — LORAZEPAM 0.5 MG: 0.5 TABLET ORAL at 20:49

## 2021-12-02 RX ADMIN — MESNA 2510 MG: 100 INJECTION, SOLUTION INTRAVENOUS at 00:11

## 2021-12-02 RX ADMIN — Medication 2 SPRAY: at 14:09

## 2021-12-02 RX ADMIN — CELECOXIB 200 MG: 200 CAPSULE ORAL at 23:11

## 2021-12-02 RX ADMIN — METRONIDAZOLE: 7.5 GEL TOPICAL at 08:43

## 2021-12-02 RX ADMIN — METRONIDAZOLE: 7.5 GEL TOPICAL at 23:11

## 2021-12-02 RX ADMIN — HYDROCORTISONE 10 MG: 10 TABLET ORAL at 13:56

## 2021-12-02 RX ADMIN — ONDANSETRON HYDROCHLORIDE 8 MG: 8 TABLET, FILM COATED ORAL at 12:29

## 2021-12-02 RX ADMIN — Medication 1000 UNITS: at 08:39

## 2021-12-02 RX ADMIN — LEVOTHYROXINE SODIUM 75 MCG: 0.05 TABLET ORAL at 08:38

## 2021-12-02 RX ADMIN — ONDANSETRON HYDROCHLORIDE 8 MG: 8 TABLET, FILM COATED ORAL at 19:35

## 2021-12-02 RX ADMIN — Medication 2 SPRAY: at 20:51

## 2021-12-02 RX ADMIN — HYDROCORTISONE 20 MG: 10 TABLET ORAL at 08:39

## 2021-12-02 RX ADMIN — PANTOPRAZOLE SODIUM 40 MG: 40 TABLET, DELAYED RELEASE ORAL at 19:35

## 2021-12-02 RX ADMIN — ONDANSETRON HYDROCHLORIDE 8 MG: 8 TABLET, FILM COATED ORAL at 04:05

## 2021-12-02 RX ADMIN — CALCIUM CARBONATE 500 MG: 500 TABLET, CHEWABLE ORAL at 19:41

## 2021-12-02 ASSESSMENT — ACTIVITIES OF DAILY LIVING (ADL)
ADLS_ACUITY_SCORE: 4
DEPENDENT_IADLS:: INDEPENDENT
ADLS_ACUITY_SCORE: 4

## 2021-12-02 ASSESSMENT — MIFFLIN-ST. JEOR: SCORE: 1364.63

## 2021-12-02 NOTE — PLAN OF CARE
Nursing Focus: Admission  D: Arrived at 7D at 1730 from home via. Patient accompanied by wife. Admitted for cycle 2 chemotherapy. No current complaints at this time.      I: Admission process began.  Patient oriented to room, enviroment, call light.  Md. notified of patients arrival on unit.     A: Vital signs stable, afebrile.  Patient stable at this time. No current complaints at this time.     P: Implement plan of care when available. Continue to monitor patient. Nursing interventions as appropriate. Notify md with changes in pt status.

## 2021-12-02 NOTE — PROVIDER NOTIFICATION
"Pgd Hospitalist at 1915    \"Pt requesting Deep Sea nasal spray for PRN. Says he uses is 4x daily. Thanks\"     Katey #[id number]  "

## 2021-12-02 NOTE — PLAN OF CARE
"Time: 9005-9561    Afebrile with a max BP of 159/92, OVSS on RA. A/Ox4, able to make needs known. Denies pain/nausea/SOB. UAL. Reports 4 regular BM on 12/2, denies diarrhea. Adequate UO. Dose #1 of CIVI ifos/mesna hung and transfusing uneventfully. Dose #1 of doxil completed without complication, pt used many ice chips for oral cryotherapy. Good blood return from all PICC lumens. Salt and soda rinse used tonight. May benefit from biotene for dry mouth. Does not like the \"tingling\" sensation of magic mouthwash.   "

## 2021-12-02 NOTE — PROGRESS NOTES
"Clinic Care Coordination Contact  Ambulatory Care Coordination to Inpatient Care Management   Hand-In Communication    Date:  December 2, 2021  Name: Ifrah Huitron is enrolled in Ambulatory Care Coordination program and I am the Lead Care Coordinator.  CC Contact Information: Epic InTutumsket + phone  Payor Source: Payor: MEDICARE / Plan: MEDICARE / Product Type: Medicare /   Current services in place:     Please see the CC Snaphot and Care Management Flowsheets for specific  details of this Ifrah Huitron care plan.   Additional details/specific concerns r/t this admission:   Goal Statement: I want to \"whip\" this cancer   Date Goal set: 11/3/2021  Barriers: None identified   Strengths: Motivated and great family support   Date to Achieve By: 2/3/2022  Patient expressed understanding of goal: Yes  Action steps to achieve this goal:  1. I will keep my future  Primary care provider,ENT,Endocrinology and Oncology appointments   2. I will take my medications as directed          I will follow this admission in Epic. Please feel free to contact me with questions or for further collaboration in discharge planning.  [employer]   Oliva García RN, Care Coordinator   [facility]'s   E-mail [email or web address]   [phone]  "

## 2021-12-02 NOTE — PLAN OF CARE
9337-9071: Pt denies nausea and reports feeling well. Ambulating in the halls and ate 100% of his breakfast. Day # 1 Ifosfamide/Mesna infusing CIVI via right PICC with great blood return.     8212-2202: Reports nausea this evening after eating dinner- states he ate too much. Antiemetic given. Reports his sore throat is starting- states this is how his mucositis started last cycle. Using s/s rinses along with Biotine spray.

## 2021-12-02 NOTE — PROGRESS NOTES
Nursing Focus: Chemotherapy  D: Positive blood return via PICC. Insertion site is clean/dry/intact, dressing intact with no complaints of pain.  Urine output is recorded in intake in Doc Flowsheet.    I: Premedications given per order (see electronic medical administration record). Dose #1 of Doxil started to infuse over 25 ml/hr. Increasing by 25 ml/hr, then 50 ml/hr until max of 265 ml/hr every 15 min as tolerated. Reviewed pt teaching on chemotherapy side effects.  Pt denies need for further teaching. Chemotherapy double checked per protocol by two chemotherapy competent RN's.   A: Tolerating procedure well. Denies nausea and or pain.   P: Continue to monitor urine output and symptoms of nausea. Screen for symptoms of toxicity.

## 2021-12-02 NOTE — PLAN OF CARE
[id number]    Hypertensive, within parameters. OVSS, afebrile and on RA. A&Ox4. Denies pain/nausea/pain. Admitted for Cycle 2 of Doxil + Ifos/mesna. Dose 1 of Doxil hung this evening, pt tolerating increase of dose every 15 min, reached max rate of 265. Pt had significant mucositis with cycle 1, encouraging to keep mouth cold during infusions. Pt has been receptive to plan and has been chewing on ice chips throughout doxil infusion. Provided and educated pt on salt & soda oral rinses, pt very appreciative. PICC placed this evening, good blood return. Up independently in room and solano. Good UOP, 2 BM during shift. Good appetite this evening. Dose 1 of Ifos/mesna to be hung later tonight. Continue to monitor & w/ POC.

## 2021-12-02 NOTE — PROGRESS NOTES
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     Plan for today  - Day 2 Doxil/Ifos, tolerating well  - Has h/o high BP. /70s this morning. Will continue to monitor blood pressures and consider starting hydrochlorothiazide outpatient if persistently hypertensive.        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 Wolff. Patient presented to his PCP in 03/2021 with chest/left shoulder and back pain that led to imaging which revealed multiple lung mets, included a left pleural mass. There were difficulties in getting diagnostic biopsy; eventually, biopsy of the mediastinal mass (5/20/21) revealed a poorly characterized malignant spindle cell neoplasm with high PD-L1 expression (90%), Ki-67 70%. 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 Insufficient material to send [facility]. Baseline imaging (6/21/21) revealed progression of lung mets and left-sided subdiaphragmatic mass, stable liver lesions. He was seen by ENT for hoarseness, which was attributed to left true vocal fold motion impairment from mediastinal mass. Given high PD-L1 expression, he was started on Keytruda (C1=6/21/21). Interval imaging (CT 8/2/21) revealed positive response to treatment. He received 6 cycles total, most recently C6=10/4/21. Unfortunately, restaging imaging (10/18/21) revealed interval increase in size of the LUQ/splenic mass;  however, the mediastinal and left pleural mass were stable to slightly decreased in size. He met with Dr. Wolff, who recommended a change in therapy to Doxil + ifosfamide. He was admitted on 10/26/21 to receive Cycle #1 of Doxil/ifos which he tolerated reasonably well with no neurotoxicities, but did have moderate chemotherapy-induced nausea. After discharge he had significant mucositis, poor PO intake, nausea, and lyte derangements. Given adverse side effects, Cycle 2 was delayed by 1 week and doxil was dose reduced to 70mg (previously given 80mg, ie 45mg/m2), Ifosfamide reduced by 10% (1500mg/m2 ? 1350mg/m2). Admitted for Cycle 2 Doxil/Ifos 12/1/21.   - Restaging imaging 11/22/21 with stable/no significant change.   - PICC ordered on admission; remove on discharge.  - OK to start chemotherapy in the setting of ALT/AST elevation per Dr. Wolff                                                        Treatment Plan: Doxil + ifosfamide (S8Y2=45/1/21).                            - Doxil 70mg IV once - D1                             - Ifosfamide/Mesna 1350 mg/m2 (2510 mg) CIVI - D1-6                             - Mesna 1350 mg/m2 (2510 mg) IV over 18 hrs - starting D7                             - Neulasta injection - D8, will request to be given on Day 12/9 or 12/10 at [facility]                            - Emend 150mg once Day 1, will repeat on Day 6 if needed for nausea prior to discharge     # Cancer-related pain  - Continue PTA Celebrex 200 mg BID   - Continue PTA topical Bengay PRN at bedtime to left back      # Normocytic Anemia, mild  Noted intermittently. Likely related to chemo, hydration, frequent blood draws.  - Monitor  - Transfuse for Hgb <7, will sign blood consent if indicated     FEN/GI  # History of transaminitis  , , TBili WNL as an outpatient on 11/29/21. PTA statin was subsequently held. Per outpatient notes he did not have any abdominal pain or other symptoms concerning for viral  hepatitis. These lab values were reviewed with Dr. Wolff, and it was discussed that this could be due to his statin and OK to proceed with chemotherapy as planned above. He also has known liver metastases which are likely contributing.   - Continue to hold PTA statin  - Follow LFTs daily      # History of chemotherapy-induced nausea  - Scheduled Zofran Q8H  - PRN compazine and Ativan  - Emend 150 mg IV x1 dose on 12/1; would consider additional dose on Day 6 if needed for nausea prior to discharge  - Could consider additional anti-emetics (i.e. Zyprexa, etc.) as indicated     # GERD  - Continue PTA omeprazole  - TUMS and Pepto Bismol available PRN      # History of Hypophosphatemia  Secondary to Ifosfamide. Required additional phos replacement after discharge from Cycle 1 Doxil/Ifos.  - Follow Phos daily while inpatient, replete per lyte criteria and consider discharge with neutraphos if indicated  - Follow phos with twice weekly labs outpatient     CARDS   # CAD   Follows with Dr. Gigi Vernon at [facility]. He was noted to have coronary calcium on chest CT. CTA 9/29/21 with moderate mid LAD stenosis. Small to moderate caliber ramus branch with severe diffuse disease.  Echo completed 9/29/21 with normal EF and no valvular dysfunction. Repeat ECHO on 10/27 with EF 60-65%, early diastolic dysfunction but otherwise no change from previous.  - PTA rosuvastatin 40 mg daily HELD for transaminitis, continue to hold on discharge  - Encourage outpatient cardiology follow-up as recommended     # History of elevated blood pressures  BPs ranging from normotensive to hypertensive (with SBP 140s-160) during previous admission. He states that he runs higher at baseline (though this is based on clinic assessments as he does not typically monitor his BP at home). Appears recent intermittent clinic BPs sit at 140s-150s/80s-90s when seen in person, otherwise many of the visits are currently virtual. He is not on any  antihypertensive medications PTA. Euvolemic on admission. Suspect he has undiagnosed essential HTN, though BP this admission appears stable 110s/70s.   - Would recommend outpatient PCP follow-up for further discussion of BP monitoring and management, consider start hydrochlorothiazide 12.5mg once daily      ENDO  # Panhypopituitarism  # Macroadenoma of the pituitary gland  Diagnosed in 2010. Found to have a pituitary macroadenoma, 1.9 cm in largest dimension. Underwent hypophysectomy on 8/23/10. Subsequent presumptive pituitary deficiency, on empiric meds for adrenal insufficiency and thyroid. Previously followed by Dr. Bill Mccall; plans to establish care with Dr. Jamir Grant on 11/11/21.  - Continue levothyroxine  - Continue hydrocortisone 15 mg qAM and 10 mg qPM  - Continue endocrinology follow-up as scheduled     ENT  # Vocal cord dysfunction  Manifested as hoarseness. Seen by ENT (Dr. Kyree Bearden) on 6/21/21 and noted to have left true vocal fold motion impairment from mediastinal mass. Underwent injection of ProLaryn (filler/bulking agent) on 7/1/21, which reportedly has been minimally beneficial. Seen in follow-up by Dr. Bearden on 9/1/21 and noted to have minimal improvement; he was then referred to the [facility] ([facility]).   - No acute inpatient management issues  - Continue outpatient ENT follow-up as previously scheduled (next scheduled for 12/9/21)    # History of chemotherapy-related mucositis  Noted after C1 Doxil/Ifos. Was using salt/soda rinses, MMW PRN, and nystatin if he were to develop thrush. On admission his mucositis was noted to have resolved.   - Biotene spray PRN  - Salt/soda rinses, MMW PRN     DERM  # Rosacea  - Continue PTA metronidazole gel     PSYCH  # History of anxiety/depression  When physically feeling better his mood is improved, denies difficulty coping on admission.   - Previously placed health psych referral     FEN:  - Encouraged PO fluids, bolus PRN  - PRN lyte  replacement  - RDAT     Prophy/Misc:  - VTE: ppx lovenox 40mg daily during admission   - GI/PUD: PTA omeprazole, Tums PRN  - Bowels: Senna and Miralax PRN  - Activity: Consider consulting PT if indicated  - COVID vaccination status: Completed 2 doses of Moderna (2nd dose given 4/15/21). Asymptomatc COVID screen negative on admission      Code: Full code  Disposition: Admitted to Oncology Team 3 service for scheduled chemotherapy. Anticipate discharge on 12/8 barring any other clinical complications  Follow up:   - requested neulasta/labs on 12/9  - requested twice weekly labs including phosphorous level  - Scheduled for an MARLEEN visit 12/15    Patient was seen and plan of care was discussed with attending physician Dr. Crow.    Chiquita Norris PA-C  Hematology/Oncology  Pager # [phone]  ___________________________________________________________________    Subjective & Interval History:    No acute events noted overnight. Admission delayed due to bed availability, so Ifos wasn't started until about midnight. He is feeling well today, was dressed and ready to go for a walk. Asked for biotene for mouth dryness. No N/V, mucositis, confusion, tremors. No headaches, vison changes, or chest pain. He has history of SBP up to 180s ~5 years ago and was told by his PCP to lose weight, which he did, and his SBP has since remained in the 140s-150s since then. He recalls always being told his BP is high but hasn't been on an antihypertensive at any point previously. He urinates about 2-3 times per night. We talked about starting hydrochlorothiazide qam for his blood pressure which he was agreeable to, however this morning prior to hydrochlorothiazide his pressure was 112/70, so we discussed again and decided not to start hydrochlorothiazide at this time. A comprehensive review of systems was reviewed with the patient and the pertinent positives are listed in the HPI above.      Physical Exam:    Blood pressure (!) 159/92,  "pulse 64, temperature 97.4  F (36.3  C), temperature source Temporal, resp. rate 14, height 1.702 m (5' 7\"), weight 66.5 kg (146 lb 11.2 oz), SpO2 100 %.     General: alert and cooperative, standing up in room, no acute distress  HEENT: sclera anicteric, EOMI, MMM  CV: RRR, normal S1/S2, no m/r/g  Resp: CTAB, no wheezing/crackles, normal respiratory effort on ambient air  GI: soft, non-tender, non-distended, bowel sounds present and normoactive  MSK: warm and well-perfused, no edema  Skin: no rashes on limited exam, no jaundice  Neuro: AOx3, moves all extremities equally, no focal deficits  RUE PICC C/D/I    Labs & Studies: I personally reviewed the following studies:  ROUTINE LABS (Last four results):  CMP  Recent Labs   Lab 12/02/21  0552 12/01/21  1746 11/29/21  1053    139 141   POTASSIUM 3.9 4.4 4.6   CHLORIDE 108 106 106   CO2 28 27 29   ANIONGAP 4 6 6   GLC 96 124* 91   BUN 20 22 20   CR 0.81 0.91 0.98   GFRESTIMATED 88 83 76   COTY 9.3 9.9 9.5   MAG 2.4*  --  2.3   PHOS 2.7 2.6 3.2   PROTTOTAL 6.3* 7.3 7.0   ALBUMIN 2.7* 3.5 3.2*   BILITOTAL 0.4 0.4 0.5   ALKPHOS 351* 453* 442*   AST 52* 92* 113*   * 203* 137*     CBC  Recent Labs   Lab 12/02/21  0552 12/01/21  1746   WBC 12.6* 13.5*   RBC 4.19* 4.84   HGB 11.4* 13.3   HCT 36.9* 43.0   MCV 88 89   MCH 27.2 27.5   MCHC 30.9* 30.9*   RDW 15.7* 15.8*    299     INR  Recent Labs   Lab 12/01/21  1730   INR 1.14       Microbiology  No results for input(s): LACT in the last 168 hours.    Medications list for reference:  Current Facility-Administered Medications   Medication    0.9% sodium chloride BOLUS    acetaminophen (TYLENOL) tablet 500-1,000 mg    albuterol (PROVENTIL HFA/VENTOLIN HFA) inhaler    albuterol (PROVENTIL) neb solution 2.5 mg    calcium carbonate (TUMS) chewable tablet 500 mg    celecoxib (celeBREX) capsule 200 mg    Chemotherapy Infusing-Continuous Infusion    [START ON 12/8/2021] dextrose 5 % flush PRE/POST medication    [START " ON 12/8/2021] dextrose 5 % flush PRE/POST medication    diphenhydrAMINE (BENADRYL) injection 50 mg    enoxaparin ANTICOAGULANT (LOVENOX) injection 40 mg    EPINEPHrine PF (ADRENALIN) injection 0.3 mg    famotidine (PEPCID) injection 20 mg    [START ON 12/8/2021] filgrastim 15 mcg/mL (in Dextrose) (NEUPOGEN) infusion 350 mcg    guaiFENesin-codeine (ROBITUSSIN AC) 100-10 MG/5ML solution 10 mL    heparin lock flush 10 UNIT/ML injection 5-20 mL    heparin lock flush 10 UNIT/ML injection 5-20 mL    hydrocortisone (CORTEF) tablet 10 mg    hydrocortisone (CORTEF) tablet 20 mg    Ifosfamide (IFEX) 2,510 mg, mesna (MESNEX) 2,510 mg in sodium chloride 0.9 % 1,125 mL infusion    levothyroxine (SYNTHROID/LEVOTHROID) tablet 75 mcg    lidocaine (LMX4) cream    lidocaine 1 % 0.1-5 mL    LORazepam (ATIVAN) tablet 0.5 mg    menthol (Topical Analgesic) 2.5% (BENGAY VANISHING SCENT) 2.5 % topical gel    meperidine (DEMEROL) injection 25 mg    [START ON 12/7/2021] mesna (MESNEX) 2,510 mg in sodium chloride 0.9 % 1,075 mL infusion    methylPREDNISolone sodium succinate (solu-MEDROL) injection 125 mg    metroNIDAZOLE (METROGEL) 0.75 % topical gel    naloxone (NARCAN) injection 0.2 mg    ondansetron (ZOFRAN) injection 8 mg    Or    ondansetron (ZOFRAN-ODT) ODT tab 8 mg    Or    ondansetron (ZOFRAN) tablet 8 mg    ondansetron (ZOFRAN) tablet 8 mg    pantoprazole (PROTONIX) EC tablet 40 mg    polyethylene glycol (MIRALAX) Packet 17 g    prochlorperazine (COMPAZINE) tablet 5 mg    Or    prochlorperazine (COMPAZINE) injection 5 mg    senna-docusate (SENOKOT-S/PERICOLACE) 8.6-50 MG per tablet 1-2 tablet    Or    senna-docusate (SENOKOT-S/PERICOLACE) 8.6-50 MG per tablet 2 tablet    sodium chloride (OCEAN) 0.65 % nasal spray 1 spray    sodium chloride (PF) 0.9% PF flush 10-20 mL    sodium chloride (PF) 0.9% PF flush 10-40 mL    sodium chloride (PF) 0.9% PF flush 10-40 mL    triamcinolone (KENALOG) 0.1 % cream    Vitamin D3 (CHOLECALCIFEROL)  tablet 1,000 Units     Chiquita Norris PA-C    ------------------------------------------------------------------------------------  PHYSICIAN ATTESTATION  I, Brice Crow, saw and evaluated Ifrah Huitron as part of a shared visit.  I have reviewed and discussed with the advanced practice provider their history, physical and plan.  I personally reviewed the vital signs, medications, labs and imaging.  My key history or physical exam findings:  Patient tolerating chemotherapy well thus far.  No physical examination abnormalities, except for a raised BP at 159/92 mmHg.  No adverse events thus far.  LFTs and alkaline phosphatase are slightly elevated, perhaps due to liver metastases (?) or recent statin use.  Key management decisions made by me:  Continue with planned inpatient chemotherapy; doxil + ifosfamide, Day 2 today.  No changes to dose or schedule.  Will begin hydrochlorothiazide 12.5 mg daily to better control blood pressure.  Monitor liver function.  I spent a total of 40 minutes on this patient on the day of the encounter, of which more than 50% of this time was used for counselling and coordination of care.  Brice Crow M.D.  Date of Service (when I saw the patient): 12/02/2021

## 2021-12-02 NOTE — PROGRESS NOTES
Nursing Focus: Chemotherapy    D: Positive blood return via PICC. Insertion site is clean/dry/intact, dressing intact with no complaints of pain.  Urine output is recorded in intake in Doc Flowsheet.      I: Premedications given per order (see electronic medical administration record). Dose #1 of CIVI ifos/mesna started to infuse over 24 hours. Reviewed pt teaching on chemotherapy side effects. Pt denies need for further teaching. Chemotherapy double checked per protocol by two chemotherapy competent RN's.     A: Tolerating infusion well. Denies nausea and or pain.     P: Continue to monitor urine output and symptoms of nausea. Screen for symptoms of toxicity.

## 2021-12-02 NOTE — PROCEDURES
[facility]    Double Lumen PICC Placement    Date/Time: 12/1/2021 6:26 PM  Performed by: Sultana Mcfarland RN  Authorized by: Chiquita Norris PA-C   Indications: chemotherapy.      UNIVERSAL PROTOCOL   Site Marked: Yes  Prior Images Obtained and Reviewed:  Yes  Required items: Required blood products, implants, devices and special equipment available    Patient identity confirmed:  Verbally with patient and arm band  NA - No sedation, light sedation, or local anesthesia  Confirmation Checklist:  Patient's identity using two indicators, relevant allergies, procedure was appropriate and matched the consent or emergent situation and correct equipment/implants were available  Time out: Immediately prior to the procedure a time out was called    Universal Protocol: the Joint Commission Universal Protocol was followed    Preparation: Patient was prepped and draped in usual sterile fashion       ANESTHESIA    Anesthesia: See MAR for details  Local Anesthetic:  Lidocaine 1% without epinephrine  Anesthetic Total (mL):  3      SEDATION    Patient Sedated: No        Preparation: skin prepped with ChloraPrep  Skin prep agent: skin prep agent completely dried prior to procedure  Sterile barriers: maximum sterile barriers were used: cap, mask, sterile gown, sterile gloves, and large sterile sheet  Hand hygiene: hand hygiene performed prior to central venous catheter insertion  Type of line used: Power PICC  Catheter type: double lumen  Lumen type: non-valved  Catheter size: 5 Fr  Brand: Provade  Lot number: [id number]  Placement method: venipuncture, MST, ultrasound and tip confirmation system (Verified by Bard 3CG)  Number of attempts: 1  Successful placement: yes  Orientation: right  Location: basilic vein (vein diameter- 0.61cm)  Site rationale: patient's choice  Arm circumference: adults 10 cm  Extremity circumference: 27  Visible catheter length: 1  Total catheter length:  38  Dressing and securement: alcohol impregnated caps, blood cleaned with CHG, chlorhexidine disc applied, site cleaned, statlock and sterile dressing applied  Post procedure assessment: blood return through all ports and free fluid flow (Bard 3CG)  PROCEDURE   Patient Tolerance:  Patient tolerated the procedure well with no immediate complicationsDescribe Procedure: PICC placement verified by [facility] 3CG. Tip at satisfactory position. PICC okay to use.

## 2021-12-02 NOTE — PROGRESS NOTES
CLINICAL NUTRITION SERVICES - ASSESSMENT NOTE     Nutrition Prescription    RECOMMENDATIONS FOR MDs/PROVIDERS TO ORDER:  None at this time     Malnutrition Status:    Severe malnutrition in the context of acute on chronic illness     Recommendations already ordered by Registered Dietitian (RD):  -High protein snacks at 10, 2, 8  -Education r/t high Kcal/high protein nutrition; lean body mass preservation    Future/Additional Recommendations:  -Encourage PO intakes at meal times as needed, richmond high protein foods  -Monitor need to adjust snacks at follow-up and/or need for ONS [none at this time, dislikes sweet flavors]  -Monitor body weight trends, labs, stooling freq     REASON FOR ASSESSMENT  Ifrah Huitron is a/an 71 year old male assessed by the dietitian for Admission Nutrition Risk Screen for unplanned weight loss, decreased appetite per MST screen.     CLINICAL HISTORY  Pt with a hx of rosacea, pituitary macroadenoma s/p resection, CAD, GERD, kidney stones, DJD, and metastatic spindle cell sarcoma with lung/liver mets. Admitted to [facility] for chemotherapy.     NUTRITION HISTORY  Pt reports that his PO intakes have been declining starting chemotherapy due to developing mucositis (currently resolved x 4 days), dysguesia, and nausea. He reports eating significantly less at meal times, and has mostly only been able to tolerate Ramen Noodles PTA. Sweet foods have been a turn off for him. Reports a noticeable depletion to his fat/muscle mass, and states that his belt no longer fits him.    Appetite started to finally improve over the past week. [facility] and currently reports having a good appetite. Has not received any prior nutrition-related counseling for prevention of wt loss and muscle mass maintenance PTA.     CURRENT NUTRITION ORDERS  Diet: Regular  Intake/Tolerance: No intake recorded yet but reports eating all of his breakfast tray today.      LABS    Mg 2.4 (H)    Na,  "K+, phos all WNL    Renal labs WNL    Elevated LFTs noted, monitor trends    Reviewed BG trends; overall WNL    MEDICATIONS    Vit D3, 1000 units/day     Senna PRN    Zofran and Compazine PRN    ANTHROPOMETRICS  Height: 170.2 cm (5' 7\")  Most Recent Weight: 66.1 kg (145 lb 11.6 oz), Admit wt 66.5 kg (146 lbs) 12/1/21  IBW: 67.3 kg  98%IBW   BMI: Normal BMI    Weight History:   -Weight down 6.9 kg (9.5%) over past 3 months; severe loss for time frame.   -Wt from 11/2 appears to be an outlier   Wt Readings from Last 15 Encounters:   12/02/21 66.1 kg (145 lb 11.6 oz)   11/12/21 68 kg (150 lb)   11/11/21 69.9 kg (154 lb)   11/04/21 70 kg (154 lb 6.4 oz)   11/02/21 85.9 kg (189 lb 6.4 oz)   09/17/21 73 kg (161 lb)   09/01/21 72.6 kg (160 lb)   08/11/21 72.6 kg (160 lb)   08/02/21 72.8 kg (160 lb 9.6 oz)   07/12/21 73 kg (161 lb)   07/01/21 73 kg (161 lb)   06/23/21 73.4 kg (161 lb 12.8 oz)   06/21/21 74.7 kg (164 lb 11.2 oz)   06/21/21 71.7 kg (158 lb)   06/08/21 71.8 kg (158 lb 6.4 oz)       Dosing Weight: 66 kg current weight 12/2    ASSESSED NUTRITION NEEDS  Estimated Energy Needs: 2760-4399 kcals/day (30 - 35 kcals/kg )  Justification: Maintenance/repletion  Estimated Protein Needs:  grams protein/day (1.2 - 1.5 grams of pro/kg)  Justification: Repletion, preservation of lean body mass   Estimated Fluid Needs: 2698-0477 mL/day (1 mL/kcal)   Justification: Maintenance    PHYSICAL FINDINGS  See malnutrition section below.    MALNUTRITION  % Intake: </=75% for >/= 1 month (severe)  % Weight Loss: > 7.5% in 3 months (severe)  Subcutaneous Fat Loss: Facial region:  Mild and Upper arm:  Mild to moderate  Muscle Loss: Temporal:  Mild, Scapular bone:  Moderate, Thoracic region (clavicle, acromium bone, deltoid, trapezius, pectoral):  Moderate, Upper arm (bicep, tricep):  Mild to moderate and Dorsal hand:  Mild   Fluid Accumulation/Edema: None noted  Malnutrition Diagnosis: Severe malnutrition in the context of acute on " chronic illness     NUTRITION DIAGNOSIS  Inadequate oral intake related to decreased appetite, dysgeusia, mucositis as a side-effect of cancer treatment as evidenced by reported diet hx, wt loss >7.5% over 3-month time frame, mild to moderate muscle/fat depletion.       INTERVENTIONS  Implementation  Nutrition education for nutrition relationship to health/disease - Discussed high Kcal/high protein nutrition therapy; protein sources in diet; small freq meals/snacks; supplements; lean body mass preservation techniques; role of RD.     Modify composition of meals/snacks - Setting up high protein snacks between meals for small/frequent high protein meal pattern per pt request/preference    Goals  Patient to consume % of nutritionally adequate meal trays TID, or the equivalent with supplements/snacks.    Maintain body weight of at least 66 kg      Monitoring/Evaluation  Progress toward goals will be monitored and evaluated per protocol.  Maynor Villalobos, NGHIA, LD, [facility]   6A/7D RD pager [phone]

## 2021-12-03 LAB
ALBUMIN SERPL-MCNC: 2.6 G/DL (ref 3.4–5)
ALP SERPL-CCNC: 328 U/L (ref 40–150)
ALT SERPL W P-5'-P-CCNC: 123 U/L (ref 0–70)
ANION GAP SERPL CALCULATED.3IONS-SCNC: 5 MMOL/L (ref 3–14)
AST SERPL W P-5'-P-CCNC: 50 U/L (ref 0–45)
BASOPHILS # BLD AUTO: 0.1 10E3/UL (ref 0–0.2)
BASOPHILS NFR BLD AUTO: 1 %
BILIRUB SERPL-MCNC: 0.2 MG/DL (ref 0.2–1.3)
BUN SERPL-MCNC: 18 MG/DL (ref 7–30)
CALCIUM SERPL-MCNC: 9 MG/DL (ref 8.5–10.1)
CHLORIDE BLD-SCNC: 109 MMOL/L (ref 94–109)
CO2 SERPL-SCNC: 27 MMOL/L (ref 20–32)
CREAT SERPL-MCNC: 1.13 MG/DL (ref 0.66–1.25)
EOSINOPHIL # BLD AUTO: 0.1 10E3/UL (ref 0–0.7)
EOSINOPHIL NFR BLD AUTO: 1 %
ERYTHROCYTE [DISTWIDTH] IN BLOOD BY AUTOMATED COUNT: 15.8 % (ref 10–15)
GFR SERPL CREATININE-BSD FRML MDRD: 64 ML/MIN/1.73M2
GLUCOSE BLD-MCNC: 102 MG/DL (ref 70–99)
HCT VFR BLD AUTO: 35.8 % (ref 40–53)
HGB BLD-MCNC: 11.1 G/DL (ref 13.3–17.7)
IMM GRANULOCYTES # BLD: 0.1 10E3/UL
IMM GRANULOCYTES NFR BLD: 1 %
LYMPHOCYTES # BLD AUTO: 1.7 10E3/UL (ref 0.8–5.3)
LYMPHOCYTES NFR BLD AUTO: 14 %
MAGNESIUM SERPL-MCNC: 2.3 MG/DL (ref 1.6–2.3)
MCH RBC QN AUTO: 27.5 PG (ref 26.5–33)
MCHC RBC AUTO-ENTMCNC: 31 G/DL (ref 31.5–36.5)
MCV RBC AUTO: 89 FL (ref 78–100)
MONOCYTES # BLD AUTO: 1.5 10E3/UL (ref 0–1.3)
MONOCYTES NFR BLD AUTO: 12 %
NEUTROPHILS # BLD AUTO: 8.5 10E3/UL (ref 1.6–8.3)
NEUTROPHILS NFR BLD AUTO: 71 %
NRBC # BLD AUTO: 0 10E3/UL
NRBC BLD AUTO-RTO: 0 /100
PHOSPHATE SERPL-MCNC: 2.4 MG/DL (ref 2.5–4.5)
PLATELET # BLD AUTO: 220 10E3/UL (ref 150–450)
POTASSIUM BLD-SCNC: 3.5 MMOL/L (ref 3.4–5.3)
PROT SERPL-MCNC: 6 G/DL (ref 6.8–8.8)
RBC # BLD AUTO: 4.04 10E6/UL (ref 4.4–5.9)
SODIUM SERPL-SCNC: 141 MMOL/L (ref 133–144)
WBC # BLD AUTO: 12.1 10E3/UL (ref 4–11)

## 2021-12-03 PROCEDURE — 83735 ASSAY OF MAGNESIUM: CPT | Performed by: INTERNAL MEDICINE

## 2021-12-03 PROCEDURE — 99233 SBSQ HOSP IP/OBS HIGH 50: CPT | Performed by: INTERNAL MEDICINE

## 2021-12-03 PROCEDURE — 36592 COLLECT BLOOD FROM PICC: CPT | Performed by: PHYSICIAN ASSISTANT

## 2021-12-03 PROCEDURE — 85025 COMPLETE CBC W/AUTO DIFF WBC: CPT | Performed by: PHYSICIAN ASSISTANT

## 2021-12-03 PROCEDURE — 120N000002 HC R&B MED SURG/OB UMMC

## 2021-12-03 PROCEDURE — 250N000013 HC RX MED GY IP 250 OP 250 PS 637: Performed by: INTERNAL MEDICINE

## 2021-12-03 PROCEDURE — 250N000011 HC RX IP 250 OP 636: Performed by: PHYSICIAN ASSISTANT

## 2021-12-03 PROCEDURE — 250N000013 HC RX MED GY IP 250 OP 250 PS 637: Performed by: PHYSICIAN ASSISTANT

## 2021-12-03 PROCEDURE — 84100 ASSAY OF PHOSPHORUS: CPT | Performed by: PHYSICIAN ASSISTANT

## 2021-12-03 PROCEDURE — 258N000003 HC RX IP 258 OP 636: Performed by: PHYSICIAN ASSISTANT

## 2021-12-03 PROCEDURE — 250N000009 HC RX 250: Performed by: PHYSICIAN ASSISTANT

## 2021-12-03 PROCEDURE — 82040 ASSAY OF SERUM ALBUMIN: CPT | Performed by: PHYSICIAN ASSISTANT

## 2021-12-03 RX ORDER — PROCHLORPERAZINE MALEATE 5 MG
5 TABLET ORAL ONCE
Status: DISCONTINUED | OUTPATIENT
Start: 2021-12-03 | End: 2021-12-08 | Stop reason: HOSPADM

## 2021-12-03 RX ORDER — PROCHLORPERAZINE MALEATE 5 MG
5-10 TABLET ORAL EVERY 6 HOURS PRN
Status: DISCONTINUED | OUTPATIENT
Start: 2021-12-03 | End: 2021-12-08 | Stop reason: HOSPADM

## 2021-12-03 RX ORDER — OLANZAPINE 5 MG/1
5 TABLET ORAL AT BEDTIME
Status: DISCONTINUED | OUTPATIENT
Start: 2021-12-03 | End: 2021-12-08 | Stop reason: HOSPADM

## 2021-12-03 RX ADMIN — MESNA 2510 MG: 100 INJECTION, SOLUTION INTRAVENOUS at 00:14

## 2021-12-03 RX ADMIN — OLANZAPINE 5 MG: 5 TABLET, FILM COATED ORAL at 21:13

## 2021-12-03 RX ADMIN — PANTOPRAZOLE SODIUM 40 MG: 40 TABLET, DELAYED RELEASE ORAL at 09:14

## 2021-12-03 RX ADMIN — Medication 1000 UNITS: at 12:29

## 2021-12-03 RX ADMIN — CELECOXIB 200 MG: 200 CAPSULE ORAL at 12:30

## 2021-12-03 RX ADMIN — Medication 5 ML: at 05:41

## 2021-12-03 RX ADMIN — PROCHLORPERAZINE EDISYLATE 5 MG: 5 INJECTION INTRAMUSCULAR; INTRAVENOUS at 10:27

## 2021-12-03 RX ADMIN — HYDROCORTISONE 10 MG: 10 TABLET ORAL at 21:14

## 2021-12-03 RX ADMIN — ONDANSETRON HYDROCHLORIDE 8 MG: 8 TABLET, FILM COATED ORAL at 04:26

## 2021-12-03 RX ADMIN — SODIUM CHLORIDE 1000 ML: 9 INJECTION, SOLUTION INTRAVENOUS at 09:53

## 2021-12-03 RX ADMIN — METRONIDAZOLE: 7.5 GEL TOPICAL at 21:16

## 2021-12-03 RX ADMIN — POTASSIUM & SODIUM PHOSPHATES POWDER PACK 280-160-250 MG 1 PACKET: 280-160-250 PACK at 12:30

## 2021-12-03 RX ADMIN — MESNA 2510 MG: 100 INJECTION, SOLUTION INTRAVENOUS at 23:50

## 2021-12-03 RX ADMIN — ONDANSETRON HYDROCHLORIDE 8 MG: 8 TABLET, FILM COATED ORAL at 12:29

## 2021-12-03 RX ADMIN — CALCIUM CARBONATE 500 MG: 500 TABLET, CHEWABLE ORAL at 10:25

## 2021-12-03 RX ADMIN — METRONIDAZOLE: 7.5 GEL TOPICAL at 10:00

## 2021-12-03 RX ADMIN — Medication 9 MMOL: at 17:51

## 2021-12-03 RX ADMIN — ONDANSETRON HYDROCHLORIDE 8 MG: 8 TABLET, FILM COATED ORAL at 21:13

## 2021-12-03 RX ADMIN — ONDANSETRON 8 MG: 4 TABLET, ORALLY DISINTEGRATING ORAL at 17:03

## 2021-12-03 RX ADMIN — ENOXAPARIN SODIUM 40 MG: 40 INJECTION SUBCUTANEOUS at 21:16

## 2021-12-03 RX ADMIN — LEVOTHYROXINE SODIUM 75 MCG: 0.05 TABLET ORAL at 05:17

## 2021-12-03 RX ADMIN — PANTOPRAZOLE SODIUM 40 MG: 40 TABLET, DELAYED RELEASE ORAL at 21:15

## 2021-12-03 RX ADMIN — ONDANSETRON 8 MG: 4 TABLET, ORALLY DISINTEGRATING ORAL at 09:49

## 2021-12-03 RX ADMIN — Medication 2 SPRAY: at 00:21

## 2021-12-03 RX ADMIN — HYDROCORTISONE 20 MG: 10 TABLET ORAL at 12:30

## 2021-12-03 ASSESSMENT — ACTIVITIES OF DAILY LIVING (ADL)
ADLS_ACUITY_SCORE: 4

## 2021-12-03 ASSESSMENT — MIFFLIN-ST. JEOR: SCORE: 1358.17

## 2021-12-03 NOTE — PROGRESS NOTES
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     Plan for today  - Day 3 Doxil/Ifos, tolerating well  - Mild HUSSEIN and nausea today. 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 Wolff. Patient presented to his PCP in 03/2021 with chest/left shoulder and back pain that led to imaging which revealed multiple lung mets, included a left pleural mass. There were difficulties in getting diagnostic biopsy; eventually, biopsy of the mediastinal mass (5/20/21) revealed a poorly characterized malignant spindle cell neoplasm with high PD-L1 expression (90%), Ki-67 70%. 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 Insufficient material to send [facility]. Baseline imaging (6/21/21) revealed progression of lung mets and left-sided subdiaphragmatic mass, stable liver lesions. He was seen by ENT for hoarseness, which was attributed to left true vocal fold motion impairment from mediastinal mass. Given high PD-L1 expression, he was started on Keytruda (C1=6/21/21). Interval imaging (CT 8/2/21) revealed positive response to treatment. He received 6 cycles total, most recently C6=10/4/21. Unfortunately, restaging imaging (10/18/21) revealed interval increase in size of the LUQ/splenic mass; however, the  mediastinal and left pleural mass were stable to slightly decreased in size. He met with Dr. Wolff, who recommended a change in therapy to Doxil + ifosfamide. He was admitted on 10/26/21 to receive Cycle #1 of Doxil/ifos which he tolerated reasonably well with no neurotoxicities, but did have moderate chemotherapy-induced nausea. After discharge he had significant mucositis, poor PO intake, nausea, and lyte derangements. Given adverse side effects, Cycle 2 was delayed by 1 week and doxil was dose reduced to 70mg (previously given 80mg, ie 45mg/m2), Ifosfamide reduced by 10% (1500mg/m2 ? 1350mg/m2). Admitted for Cycle 2 Doxil/Ifos 12/1/21.   - Restaging imaging 11/22/21 with stable/no significant change.   - PICC ordered on admission; remove on discharge.  - OK to start chemotherapy in the setting of ALT/AST elevation per Dr. Wolff                                                        Treatment Plan: Doxil + ifosfamide (H2M6=17/1/21).                            - Doxil 70mg IV once - D1                             - Ifosfamide/Mesna 1350 mg/m2 (2510 mg) CIVI - D1-6                             - Mesna 1350 mg/m2 (2510 mg) IV over 18 hrs - starting D7                             - Neulasta injection - D8, scheduled 12/9 at the [facility] prior to his appt with ENT appt same day, in the future will continue to schedule at [facility]                            - Emend 150mg once Day 1, will repeat on Day 6 if needed for nausea prior to discharge     # Cancer-related pain  - Continue PTA Celebrex 200 mg BID   - Continue PTA topical Bengay PRN at bedtime to left back      # Normocytic Anemia, mild  Noted intermittently. Likely related to chemo, hydration, frequent blood draws.  - Monitor  - Transfuse for Hgb <7, will sign blood consent if indicated     NEPH  # Mild HUSSEIN  Baseline Cr ~0.8. On 12/3 his creatinine is 1.13. He is urinating well. During cycle 1 Doxil/Ifos his creatinine increased to 1.01. He notes increased nausea  and isn't drinking much fluids due to nausea. HUSSEIN is likely due to hypovolemia  - 1L NS 12/3  - Continue to monitor and consider further workup if no improvement/worsening    FEN/GI  # History of transaminitis  , , TBili WNL as an outpatient on 11/29/21. PTA statin was subsequently held. Per outpatient notes he did not have any abdominal pain or other symptoms concerning for viral hepatitis. These lab values were reviewed with Dr. Wolff, and it was discussed that this could be due to his statin and OK to proceed with chemotherapy as planned above. He also has known liver metastases which are likely contributing.   - Continue to hold PTA statin  - Follow LFTs daily      # History of chemotherapy-induced nausea  - Scheduled Zofran Q8H  - PRN compazine and Ativan  - Emend 150 mg IV x1 dose on 12/1; would consider additional dose on Day 6 if needed for nausea prior to discharge  - Start Zyprexa 5mg at bedtime for persistent nausea despite zofran and compazine x12/3     # GERD  - Continue PTA omeprazole  - TUMS and Pepto Bismol available PRN      # History of Hypophosphatemia  # History of Hypokalemia  Secondary to Ifosfamide. Required additional phos and K replacement after discharge from Cycle 1 Doxil/Ifos.  - Follow Phos and K daily while inpatient, replete per lyte criteria and plan to discharge with neutraphos (ideally pills if available over packets)  - Replace Phos IV per lyte criteria while inpatient because pt doesn't like neutraphos packets  - Follow phos with twice weekly labs outpatient     CARDS   # CAD   Follows with Dr. Gigi Vernon at [facility]. He was noted to have coronary calcium on chest CT. CTA 9/29/21 with moderate mid LAD stenosis. Small to moderate caliber ramus branch with severe diffuse disease.  Echo completed 9/29/21 with normal EF and no valvular dysfunction. Repeat ECHO on 10/27 with EF 60-65%, early diastolic dysfunction but otherwise no change from  previous.  - PTA rosuvastatin 40 mg daily HELD for transaminitis, continue to hold on discharge  - Encourage outpatient cardiology follow-up as recommended     # History of elevated blood pressures  BPs ranging from normotensive to hypertensive (with SBP 140s-160) during previous admission. He states that he runs higher at baseline (though this is based on clinic assessments as he does not typically monitor his BP at home). Appears recent intermittent clinic BPs sit at 140s-150s/80s-90s when seen in person, otherwise many of the visits are currently virtual. He is not on any antihypertensive medications PTA. Euvolemic on admission. Suspect he has undiagnosed essential HTN, though BP this admission appears stable 110s/70s.   - Would recommend outpatient PCP follow-up for further discussion of BP monitoring and management, consider starting hydrochlorothiazide 12.5mg once daily      ENDO  # Panhypopituitarism  # Macroadenoma of the pituitary gland  Diagnosed in 2010. Found to have a pituitary macroadenoma, 1.9 cm in largest dimension. Underwent hypophysectomy on 8/23/10. Subsequent presumptive pituitary deficiency, on empiric meds for adrenal insufficiency and thyroid. Previously followed by Dr. Bill Mccall; plans to establish care with Dr. Jamir Grant on 11/11/21.  - Continue levothyroxine  - Continue hydrocortisone 15 mg qAM and 10 mg qPM  - Continue endocrinology follow-up as scheduled     ENT  # Vocal cord dysfunction  Manifested as hoarseness. Seen by ENT (Dr. Kyree Bearden) on 6/21/21 and noted to have left true vocal fold motion impairment from mediastinal mass. Underwent injection of ProLaryn (filler/bulking agent) on 7/1/21, which reportedly has been minimally beneficial. Seen in follow-up by Dr. Bearden on 9/1/21 and noted to have minimal improvement; he was then referred to the [facility] ([facility]   - No acute inpatient management issues  - Continue outpatient ENT follow-up as previously  "scheduled (next scheduled for 12/9/21)    # History of chemotherapy-related mucositis  Noted after C1 Doxil/Ifos. Was using salt/soda rinses, MMW PRN, and nystatin if he were to develop thrush. On admission his mucositis was noted to have resolved.   - Biotene spray PRN  - Salt/soda rinses, MMW PRN     DERM  # Rosacea  - Continue PTA metronidazole gel     PSYCH  # History of anxiety/depression  When physically feeling better his mood is improved, denies difficulty coping on admission.   - Previously placed health psych referral     FEN:  - Encouraged PO fluids, bolus PRN  - PRN lyte replacement  - RDAT     Prophy/Misc:  - VTE: ppx lovenox 40mg daily during admission   - GI/PUD: PTA omeprazole, Tums PRN  - Bowels: Senna and Miralax PRN  - Activity: Consider consulting PT if indicated  - COVID vaccination status: Completed 2 doses of Moderna (2nd dose given 4/15/21). Asymptomatic COVID screen negative on admission      Code: Full code  Disposition: Admitted to Oncology Team 3 service for scheduled chemotherapy. Anticipate discharge on 12/8 barring any other clinical complications  Follow up:   - Neulasta/labs on 12/9  - ENT appt 12/9  - Twice weekly labs including phosphorous level at [facility] 12/13-12/30. With future cycles he would prefer to have labs arranged at the [facility] clinic but we were unable to arrange this presently  - Scheduled for an MARLEEN visit 12/15    Patient was seen and plan of care was discussed with attending physician Dr. Crow.    Chiquita Norris PA-C  Hematology/Oncology  Pager # [phone]  ___________________________________________________________________    Subjective & Interval History:    No acute events noted overnight. Felt nauseous after dinner last night, pt reports he ate too much and it made him feel sick. This morning he was still feeling nauseous, zofran helps but he still has persistent nausea rated at \"25%\". His throat is starting to hurt a little which is how his " "mucositis started during the previous admission. Biotene and salt/soda rinses are helping.     Physical Exam:    Blood pressure (!) 152/83, pulse 77, temperature 97.4  F (36.3  C), temperature source Temporal, resp. rate 18, height 1.702 m (5' 7\"), weight 65.5 kg (144 lb 4.8 oz), SpO2 98 %.     General: alert and cooperative, sitting in chair at bedside, no acute distress  HEENT: sclera anicteric, EOMI, MMM  CV: RRR, normal S1/S2, no m/r/g  Resp: CTAB, no wheezing/crackles, normal respiratory effort on ambient air  GI: soft, non-tender, non-distended, bowel sounds present and normoactive  MSK: warm and well-perfused, no edema  Skin: no rashes on limited exam, no jaundice  Neuro: AOx3, moves all extremities equally, no focal deficits  RUE PICC C/D/I    Labs & Studies: I personally reviewed the following studies:  ROUTINE LABS (Last four results):  CMP  Recent Labs   Lab 12/03/21 0548 12/02/21  0552 12/01/21  1746 11/29/21  1053    140 139 141   POTASSIUM 3.5 3.9 4.4 4.6   CHLORIDE 109 108 106 106   CO2 27 28 27 29   ANIONGAP 5 4 6 6   * 96 124* 91   BUN 18 20 22 20   CR 1.13 0.81 0.91 0.98   GFRESTIMATED 64 88 83 76   COTY 9.0 9.3 9.9 9.5   MAG 2.3 2.4*  --  2.3   PHOS 2.4* 2.7 2.6 3.2   PROTTOTAL 6.0* 6.3* 7.3 7.0   ALBUMIN 2.6* 2.7* 3.5 3.2*   BILITOTAL 0.2 0.4 0.4 0.5   ALKPHOS 328* 351* 453* 442*   AST 50* 52* 92* 113*   * 147* 203* 137*     CBC  Recent Labs   Lab 12/03/21  0548 12/02/21  0552 12/01/21  1746   WBC 12.1* 12.6* 13.5*   RBC 4.04* 4.19* 4.84   HGB 11.1* 11.4* 13.3   HCT 35.8* 36.9* 43.0   MCV 89 88 89   MCH 27.5 27.2 27.5   MCHC 31.0* 30.9* 30.9*   RDW 15.8* 15.7* 15.8*    236 299     INR  Recent Labs   Lab 12/01/21  1730   INR 1.14       Microbiology  No results for input(s): LACT in the last 168 hours.    Medications list for reference:  Current Facility-Administered Medications   Medication    0.9% sodium chloride BOLUS    0.9% sodium chloride BOLUS    acetaminophen " (TYLENOL) tablet 500-1,000 mg    albuterol (PROVENTIL HFA/VENTOLIN HFA) inhaler    albuterol (PROVENTIL) neb solution 2.5 mg    artificial saliva (BIOTENE MT) solution 1-2 spray    calcium carbonate (TUMS) chewable tablet 500 mg    celecoxib (celeBREX) capsule 200 mg    Chemotherapy Infusing-Continuous Infusion    diphenhydrAMINE (BENADRYL) injection 50 mg    enoxaparin ANTICOAGULANT (LOVENOX) injection 40 mg    EPINEPHrine PF (ADRENALIN) injection 0.3 mg    famotidine (PEPCID) injection 20 mg    guaiFENesin-codeine (ROBITUSSIN AC) 100-10 MG/5ML solution 10 mL    heparin lock flush 10 UNIT/ML injection 5-20 mL    heparin lock flush 10 UNIT/ML injection 5-20 mL    hydrocortisone (CORTEF) tablet 10 mg    hydrocortisone (CORTEF) tablet 20 mg    Ifosfamide (IFEX) 2,510 mg, mesna (MESNEX) 2,510 mg in sodium chloride 0.9 % 1,125 mL infusion    levothyroxine (SYNTHROID/LEVOTHROID) tablet 75 mcg    lidocaine (LMX4) cream    lidocaine 1 % 0.1-5 mL    LORazepam (ATIVAN) tablet 0.5 mg    menthol (Topical Analgesic) 2.5% (BENGAY VANISHING SCENT) 2.5 % topical gel    meperidine (DEMEROL) injection 25 mg    [START ON 12/7/2021] mesna (MESNEX) 2,510 mg in sodium chloride 0.9 % 1,075 mL infusion    methylPREDNISolone sodium succinate (solu-MEDROL) injection 125 mg    metroNIDAZOLE (METROGEL) 0.75 % topical gel    naloxone (NARCAN) injection 0.2 mg    ondansetron (ZOFRAN) injection 8 mg    Or    ondansetron (ZOFRAN-ODT) ODT tab 8 mg    Or    ondansetron (ZOFRAN) tablet 8 mg    ondansetron (ZOFRAN) tablet 8 mg    pantoprazole (PROTONIX) EC tablet 40 mg    polyethylene glycol (MIRALAX) Packet 17 g    potassium & sodium phosphates (NEUTRA-PHOS) Packet 1 packet    prochlorperazine (COMPAZINE) tablet 5 mg    Or    prochlorperazine (COMPAZINE) injection 5 mg    senna-docusate (SENOKOT-S/PERICOLACE) 8.6-50 MG per tablet 1-2 tablet    Or    senna-docusate (SENOKOT-S/PERICOLACE) 8.6-50 MG per tablet 2 tablet    sodium chloride (OCEAN) 0.65 %  nasal spray 1 spray    sodium chloride (PF) 0.9% PF flush 10-20 mL    sodium chloride (PF) 0.9% PF flush 10-40 mL    sodium chloride (PF) 0.9% PF flush 10-40 mL    triamcinolone (KENALOG) 0.1 % cream    Vitamin D3 (CHOLECALCIFEROL) tablet 1,000 Units     Chiquita Norris PA-C    ------------------------------------------------------------------------------------  PHYSICIAN ATTESTATION  I, Brice Crow, saw and evaluated Ifrah Huitron today as part of a shared visit.  I have reviewed and discussed with the advanced practice provider their history, physical and plan.  I personally reviewed the: vital signs, medications, labs and imaging.  My key history or physical exam findings:  Patient tolerating chemotherapy well thus far.  No physical examination abnormalities, except for a raised BP at 135/75 mmHg.  Reported nausea and anorexia this morning.  Also has sore throat, representing early mucositis.  On labs, he had a slight HUSSEIN, with creatinine 1.13.  Phosphorus was 2.4, so we replaced it.  Key management decisions made by me:  Continue with planned inpatient chemotherapy; doxil + ifosfamide, Day 3 today.  No changes to dose or schedule.  Monitor kidney function and P levels.  I spent a total of 40 minutes on this patient on the day of the encounter, of which more than 50% of this time was used for counselling and coordination of care.  Brice Crow M.D.  Date of Service (when I saw the patient): 12/03/2021

## 2021-12-03 NOTE — PLAN OF CARE
Alert and oriented X4. AVSS. Denies pain or difficulty breathing. Reports nausea but states it's much improved compared to last evening. Day #2 Ifosfamide/Mesna infusing. Brisk blood return via PICC. Tolerating infusion well. No tremors or other s/sx of neurotoxicity noted. Sleeping in between cares.

## 2021-12-03 NOTE — PLAN OF CARE
Time: 3536-1128    Afebrile with a max BP of 152/81, OVSS on RA. Denies pain, but endorses nausea from eating a big dinner. Scheduled zofran and PRN ativan given with some relief. Sips of soda also. 2 BM today. Neuropathy in feet and RUE (fingertips) have tingling at baseline. On lovenox. Up ad kaitlynn. PICC infusing dose #1 CIVI ifos/mesna, good blood return. Next dose at 0000. Continue with POC.

## 2021-12-04 LAB
ALBUMIN SERPL-MCNC: 2.7 G/DL (ref 3.4–5)
ALP SERPL-CCNC: 374 U/L (ref 40–150)
ALT SERPL W P-5'-P-CCNC: 188 U/L (ref 0–70)
ANION GAP SERPL CALCULATED.3IONS-SCNC: 6 MMOL/L (ref 3–14)
AST SERPL W P-5'-P-CCNC: 119 U/L (ref 0–45)
BASOPHILS # BLD AUTO: 0.1 10E3/UL (ref 0–0.2)
BASOPHILS NFR BLD AUTO: 1 %
BILIRUB SERPL-MCNC: 0.3 MG/DL (ref 0.2–1.3)
BUN SERPL-MCNC: 14 MG/DL (ref 7–30)
CALCIUM SERPL-MCNC: 9.2 MG/DL (ref 8.5–10.1)
CHLORIDE BLD-SCNC: 110 MMOL/L (ref 94–109)
CO2 SERPL-SCNC: 28 MMOL/L (ref 20–32)
CREAT SERPL-MCNC: 0.76 MG/DL (ref 0.66–1.25)
EOSINOPHIL # BLD AUTO: 0.1 10E3/UL (ref 0–0.7)
EOSINOPHIL NFR BLD AUTO: 1 %
ERYTHROCYTE [DISTWIDTH] IN BLOOD BY AUTOMATED COUNT: 15.9 % (ref 10–15)
GFR SERPL CREATININE-BSD FRML MDRD: >90 ML/MIN/1.73M2
GLUCOSE BLD-MCNC: 82 MG/DL (ref 70–99)
HCT VFR BLD AUTO: 35.1 % (ref 40–53)
HGB BLD-MCNC: 11 G/DL (ref 13.3–17.7)
IMM GRANULOCYTES # BLD: 0.1 10E3/UL
IMM GRANULOCYTES NFR BLD: 1 %
LYMPHOCYTES # BLD AUTO: 1.8 10E3/UL (ref 0.8–5.3)
LYMPHOCYTES NFR BLD AUTO: 17 %
MAGNESIUM SERPL-MCNC: 2.4 MG/DL (ref 1.6–2.3)
MCH RBC QN AUTO: 27.5 PG (ref 26.5–33)
MCHC RBC AUTO-ENTMCNC: 31.3 G/DL (ref 31.5–36.5)
MCV RBC AUTO: 88 FL (ref 78–100)
MONOCYTES # BLD AUTO: 1.3 10E3/UL (ref 0–1.3)
MONOCYTES NFR BLD AUTO: 13 %
NEUTROPHILS # BLD AUTO: 6.9 10E3/UL (ref 1.6–8.3)
NEUTROPHILS NFR BLD AUTO: 67 %
NRBC # BLD AUTO: 0 10E3/UL
NRBC BLD AUTO-RTO: 0 /100
PHOSPHATE SERPL-MCNC: 2.7 MG/DL (ref 2.5–4.5)
PLATELET # BLD AUTO: 204 10E3/UL (ref 150–450)
POTASSIUM BLD-SCNC: 3.2 MMOL/L (ref 3.4–5.3)
POTASSIUM BLD-SCNC: 3.7 MMOL/L (ref 3.4–5.3)
PROT SERPL-MCNC: 6 G/DL (ref 6.8–8.8)
RBC # BLD AUTO: 4 10E6/UL (ref 4.4–5.9)
SODIUM SERPL-SCNC: 144 MMOL/L (ref 133–144)
WBC # BLD AUTO: 10.4 10E3/UL (ref 4–11)

## 2021-12-04 PROCEDURE — 250N000011 HC RX IP 250 OP 636: Performed by: PHYSICIAN ASSISTANT

## 2021-12-04 PROCEDURE — 36592 COLLECT BLOOD FROM PICC: CPT | Performed by: INTERNAL MEDICINE

## 2021-12-04 PROCEDURE — 84100 ASSAY OF PHOSPHORUS: CPT | Performed by: PHYSICIAN ASSISTANT

## 2021-12-04 PROCEDURE — 36592 COLLECT BLOOD FROM PICC: CPT | Performed by: PHYSICIAN ASSISTANT

## 2021-12-04 PROCEDURE — 250N000011 HC RX IP 250 OP 636: Performed by: INTERNAL MEDICINE

## 2021-12-04 PROCEDURE — 84132 ASSAY OF SERUM POTASSIUM: CPT | Performed by: INTERNAL MEDICINE

## 2021-12-04 PROCEDURE — 83735 ASSAY OF MAGNESIUM: CPT | Performed by: INTERNAL MEDICINE

## 2021-12-04 PROCEDURE — 99232 SBSQ HOSP IP/OBS MODERATE 35: CPT | Performed by: STUDENT IN AN ORGANIZED HEALTH CARE EDUCATION/TRAINING PROGRAM

## 2021-12-04 PROCEDURE — 80053 COMPREHEN METABOLIC PANEL: CPT | Performed by: PHYSICIAN ASSISTANT

## 2021-12-04 PROCEDURE — 85025 COMPLETE CBC W/AUTO DIFF WBC: CPT | Performed by: PHYSICIAN ASSISTANT

## 2021-12-04 PROCEDURE — 120N000002 HC R&B MED SURG/OB UMMC

## 2021-12-04 PROCEDURE — 250N000013 HC RX MED GY IP 250 OP 250 PS 637: Performed by: PHYSICIAN ASSISTANT

## 2021-12-04 RX ORDER — POTASSIUM CHLORIDE 29.8 MG/ML
20 INJECTION INTRAVENOUS
Status: COMPLETED | OUTPATIENT
Start: 2021-12-04 | End: 2021-12-04

## 2021-12-04 RX ADMIN — Medication 1000 UNITS: at 09:08

## 2021-12-04 RX ADMIN — CELECOXIB 200 MG: 200 CAPSULE ORAL at 00:36

## 2021-12-04 RX ADMIN — ONDANSETRON HYDROCHLORIDE 8 MG: 8 TABLET, FILM COATED ORAL at 19:57

## 2021-12-04 RX ADMIN — CELECOXIB 200 MG: 200 CAPSULE ORAL at 11:05

## 2021-12-04 RX ADMIN — OLANZAPINE 5 MG: 5 TABLET, FILM COATED ORAL at 22:22

## 2021-12-04 RX ADMIN — HYDROCORTISONE 10 MG: 10 TABLET ORAL at 14:37

## 2021-12-04 RX ADMIN — METRONIDAZOLE: 7.5 GEL TOPICAL at 19:58

## 2021-12-04 RX ADMIN — METRONIDAZOLE: 7.5 GEL TOPICAL at 09:08

## 2021-12-04 RX ADMIN — LEVOTHYROXINE SODIUM 75 MCG: 0.05 TABLET ORAL at 05:03

## 2021-12-04 RX ADMIN — PANTOPRAZOLE SODIUM 40 MG: 40 TABLET, DELAYED RELEASE ORAL at 19:58

## 2021-12-04 RX ADMIN — POTASSIUM CHLORIDE 20 MEQ: 29.8 INJECTION, SOLUTION INTRAVENOUS at 09:18

## 2021-12-04 RX ADMIN — HYDROCORTISONE 20 MG: 10 TABLET ORAL at 09:09

## 2021-12-04 RX ADMIN — ONDANSETRON HYDROCHLORIDE 8 MG: 8 TABLET, FILM COATED ORAL at 11:05

## 2021-12-04 RX ADMIN — ENOXAPARIN SODIUM 40 MG: 40 INJECTION SUBCUTANEOUS at 19:57

## 2021-12-04 RX ADMIN — Medication 5 ML: at 07:36

## 2021-12-04 RX ADMIN — ONDANSETRON HYDROCHLORIDE 8 MG: 8 TABLET, FILM COATED ORAL at 05:03

## 2021-12-04 RX ADMIN — PROCHLORPERAZINE MALEATE 10 MG: 5 TABLET ORAL at 18:21

## 2021-12-04 RX ADMIN — PANTOPRAZOLE SODIUM 40 MG: 40 TABLET, DELAYED RELEASE ORAL at 09:09

## 2021-12-04 RX ADMIN — POTASSIUM CHLORIDE 20 MEQ: 29.8 INJECTION, SOLUTION INTRAVENOUS at 11:05

## 2021-12-04 ASSESSMENT — ACTIVITIES OF DAILY LIVING (ADL)
ADLS_ACUITY_SCORE: 4

## 2021-12-04 ASSESSMENT — MIFFLIN-ST. JEOR: SCORE: 1350.63

## 2021-12-04 NOTE — PLAN OF CARE
7553-1636  AVSS. Denied pain. C/O continuous nausea, partially relieved with scheduled and prn Zofran, prn Compazine and aromatherapy. Up ad kaitlynn in room and halls. Poor po intake r/t nausea. Adequate urine output. LBM 12/2. Currently receiving day # 2 Ifosfamide and Mesna. Phosphorus being replaced via IV, recheck with morning labs. Wife at bedside this afternoon and attentive to pt.

## 2021-12-04 NOTE — PLAN OF CARE
4716-7031  Alert and oriented. VSS. Intermittently hypertensive. 170's/100's this AM, recheck was 150's/80's. Dose #3 of Ifos/Mesna hung. No complaints of pain. Intermittent nausea reported, controlled well with scheduled zofran. No acute events, continue with POC.

## 2021-12-04 NOTE — PLAN OF CARE
Nursing Focus: Chemotherapy    D: Positive blood return via PICC. Insertion site is clean/dry/intact, dressing intact with no complaints of pain.  Urine output is recorded in intake in Doc Flowsheet.      I: Premedications given per order (see electronic medical administration record). Dose #3 of Ifosfamide/Mesna started to infuse over 24 hours. Reviewed pt teaching on chemotherapy side effects.  Pt denies need for further teaching. Chemotherapy double checked per protocol by two chemotherapy competent RN's.     A: Tolerating infusion well. Denies nausea and or pain.     P: Continue to monitor urine output and symptoms of nausea. Screen for symptoms of toxicity.

## 2021-12-04 NOTE — PLAN OF CARE
9946-1159  Experiencing mild bilateral hand tremors which are new as of last evening per pt report, MDs aware. Continue to monitor closely and watch for other s/s of Ifosfamide induced neurotoxicity. Hypertensive, OVSS. Denied pain. C/O continuous nausea that waxes and wanes with scheduled and prn antiemetics. Up ad kaitlynn in room and halls. Poor po intake r/t nausea and decreased appetite. Adequate urine output. LBM 12/3. Potassium replaced, recheck 3.7. Wife at bedside this evening and attentive to pt. Currently receiving Day # 3 Ifosfamide and Mesna with brisk blood return noted from PICC Q4 hours.

## 2021-12-04 NOTE — PROGRESS NOTES
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     Today:  - Day 4 Doxil/Ifos, tolerating well apart from mild bilateral hand tremor. Continue to monitor.   - Improvement in nausea after Zyprexa 5 mg at bedtime (12/3); will continue.   - Received 1L NS 12/3 for mild HUSSEIN (Cr 1.13) - improved to 0.76 today. Encouraged PO intake.   - Mild LFT elevation, likely secondary to ifosfamide. Continue to monitor.      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 Wolff. Patient presented to his PCP in 03/2021 with chest/left shoulder and back pain that led to imaging which revealed multiple lung mets, included a left pleural mass. There were difficulties in getting diagnostic biopsy; eventually, biopsy of the mediastinal mass (5/20/21) revealed a poorly characterized malignant spindle cell neoplasm with high PD-L1 expression (90%), Ki-67 70%. 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 Insufficient material to send [facility]. Baseline imaging (6/21/21) revealed progression of lung mets and left-sided subdiaphragmatic mass, stable liver lesions. He was seen by ENT for hoarseness, which was attributed to left true vocal fold motion impairment from mediastinal mass. Given high PD-L1 expression, he was started on Keytruda (C1=6/21/21). Interval imaging (CT 8/2/21) revealed positive response to treatment. He received 6 cycles total,  most recently C6=10/4/21. Unfortunately, restaging imaging (10/18/21) revealed interval increase in size of the LUQ/splenic mass; however, the mediastinal and left pleural mass were stable to slightly decreased in size. He met with Dr. Wolff, who recommended a change in therapy to Doxil + ifosfamide. He was admitted on 10/26/21 to receive Cycle #1 of Doxil/ifos which he tolerated reasonably well with no neurotoxicities, but did have moderate chemotherapy-induced nausea. After discharge he had significant mucositis, poor PO intake, nausea, and lyte derangements. Given adverse side effects, Cycle 2 was delayed by 1 week and doxil was dose reduced to 70mg (previously given 80mg, ie 45mg/m2), Ifosfamide reduced by 10% (1500mg/m2 ? 1350mg/m2). Admitted for Cycle 2 Doxil/Ifos 12/1/21.   - Restaging imaging 11/22/21 with stable/no significant change.   - PICC ordered on admission; remove on discharge.  - OK to start chemotherapy in the setting of ALT/AST elevation per Dr. Wolff  - Mild bilateral hand tremor noted 12/4. Worse with intention. Present intermittently. Will continue to monitor closely for other s/sx of neurotoxicity.                                                        Treatment Plan: Doxil + ifosfamide (P3J1=72/1/21).                            - Doxil 70mg IV once - D1                             - Ifosfamide/Mesna 1350 mg/m2 (2510 mg) CIVI - D1-6                             - Mesna 1350 mg/m2 (2510 mg) IV over 18 hrs - starting D7                             - Neulasta injection - D8, scheduled 12/9 at the [facility] prior to his appt with ENT appt same day, in the future will continue to schedule at [facility] [place]                            - Emend 150mg once Day 1, will repeat on Day 6 if needed for nausea prior to discharge     # Cancer-related pain  - Continue PTA Celebrex 200 mg BID   - Continue PTA topical Bengay PRN at bedtime to left back      # Normocytic Anemia, mild  Noted intermittently. Likely  related to chemo, hydration, frequent blood draws.  - Monitor  - Transfuse for Hgb <7, will sign blood consent if indicated     NEPH  # Mild HUSSEIN  Baseline Cr ~0.8. On 12/3 his creatinine is 1.13. He is urinating well. During cycle 1 Doxil/Ifos his creatinine increased to 1.01. He notes increased nausea and isn't drinking much fluids due to nausea. HUSSEIN is likely due to hypovolemia  - 1L NS 12/3  - Continue to monitor and consider further workup if no improvement/worsening    FEN/GI  # History of transaminitis  , , TBili WNL as an outpatient on 11/29/21. PTA statin was subsequently held. Per outpatient notes he did not have any abdominal pain or other symptoms concerning for viral hepatitis. These lab values were reviewed with Dr. Wolff, and it was discussed that this could be due to his statin and OK to proceed with chemotherapy as planned above. He also has known liver metastases which are likely contributing.   - Continue to hold PTA statin  - Follow LFTs daily      # History of chemotherapy-induced nausea  - Scheduled Zofran Q8H  - PRN compazine and Ativan  - Emend 150 mg IV x1 dose on 12/1; would consider additional dose on Day 6 if needed for nausea prior to discharge  - Start Zyprexa 5mg at bedtime for persistent nausea despite zofran and compazine x12/3. Much improved. Will continue daily at bedtime.      # GERD  - Continue PTA omeprazole  - TUMS and Pepto Bismol available PRN      # History of Hypophosphatemia  # History of Hypokalemia  Secondary to Ifosfamide. Required additional phos and K replacement after discharge from Cycle 1 Doxil/Ifos.  - Follow Phos and K daily while inpatient, replete per lyte criteria and plan to discharge with neutraphos (ideally pills if available over packets)  - Replace Phos IV per lyte criteria while inpatient because pt doesn't like neutraphos packets  - Follow phos with twice weekly labs outpatient     CARDS   # CAD   Follows with Dr. Gigi Vernon at [facility]. He was noted to have coronary calcium on chest CT. CTA 9/29/21 with moderate mid LAD stenosis. Small to moderate caliber ramus branch with severe diffuse disease.  Echo completed 9/29/21 with normal EF and no valvular dysfunction. Repeat ECHO on 10/27 with EF 60-65%, early diastolic dysfunction but otherwise no change from previous.  - PTA rosuvastatin 40 mg daily HELD for transaminitis, continue to hold on discharge  - Encourage outpatient cardiology follow-up as recommended     # History of elevated blood pressures  BPs ranging from normotensive to hypertensive (with SBP 140s-160) during previous admission. He states that he runs higher at baseline (though this is based on clinic assessments as he does not typically monitor his BP at home). Appears recent intermittent clinic BPs sit at 140s-150s/80s-90s when seen in person, otherwise many of the visits are currently virtual. He is not on any antihypertensive medications PTA. Euvolemic on admission. Suspect he has undiagnosed essential HTN, though BP this admission appears stable 110s/70s.   - Would recommend outpatient PCP follow-up for further discussion of BP monitoring and management, consider starting hydrochlorothiazide 12.5mg once daily      ENDO  # Panhypopituitarism  # Macroadenoma of the pituitary gland  Diagnosed in 2010. Found to have a pituitary macroadenoma, 1.9 cm in largest dimension. Underwent hypophysectomy on 8/23/10. Subsequent presumptive pituitary deficiency, on empiric meds for adrenal insufficiency and thyroid. Previously followed by Dr. Bill Mccall; plans to establish care with Dr. Jamir Grant on 11/11/21.  - Continue levothyroxine  - Continue hydrocortisone 15 mg qAM and 10 mg qPM  - Continue endocrinology follow-up as scheduled     ENT  # Vocal cord dysfunction  Manifested as hoarseness. Seen by ENT (Dr. yKree Bearden) on 6/21/21 and noted to have left true vocal fold motion impairment from mediastinal mass.  Underwent injection of ProLaryn (filler/bulking agent) on 7/1/21, which reportedly has been minimally beneficial. Seen in follow-up by Dr. Bearden on 9/1/21 and noted to have minimal improvement; he was then referred to the [facility] ([facility]   - No acute inpatient management issues  - Continue outpatient ENT follow-up as previously scheduled (next scheduled for 12/9/21)    # History of chemotherapy-related mucositis  Noted after C1 Doxil/Ifos. Was using salt/soda rinses, MMW PRN, and nystatin if he were to develop thrush. On admission his mucositis was noted to have resolved.   - Biotene spray PRN  - Salt/soda rinses, MMW PRN     DERM  # Rosacea  - Continue PTA metronidazole gel     PSYCH  # History of anxiety/depression  When physically feeling better his mood is improved, denies difficulty coping on admission.   - Previously placed health psych referral     FEN:  - Encouraged PO fluids, bolus PRN  - PRN lyte replacement  - RDAT     Prophy/Misc:  - VTE: ppx lovenox 40mg daily during admission   - GI/PUD: PTA omeprazole, Tums PRN  - Bowels: Senna and Miralax PRN  - Activity: Consider consulting PT if indicated  - COVID vaccination status: Completed 2 doses of Moderna (2nd dose given 4/15/21). Asymptomatic COVID screen negative on admission      Code: Full code  Disposition: Admitted to Oncology Team 3 service for scheduled chemotherapy. Anticipate discharge on 12/8 barring any other clinical complications  Follow up:   - Neulasta/labs on 12/9  - ENT appt 12/9  - Twice weekly labs including phosphorous level at [facility] 12/13-12/30. With future cycles he would prefer to have labs arranged at the [facility] but we were unable to arrange this presently  - Scheduled for an MARLEEN visit 12/15    Patient was seen and plan of care was discussed with attending physician Dr. Crow.    Jina Hairston PA-C  Hematology/Oncology  Pager  "[phone]  ___________________________________________________________________    Subjective & Interval History:    No acute events noted overnight. Improvement in nausea this morning after zyprexa at bedtime. Still would like a zofran as feeling like he might get more nauseous. Did note a new bilateral upper extremity hand tremor, worse with intention. Limited to hands. No other new or worsening symptoms. Feels a little more tired and slightly \"out of it\" but remains alert and oriented. Watching his grandson's basketball game on his iPad and endorses details about his family and 19 grandkids. Encouraged to continue eating and drinking well. Questions answered at bedside.     Physical Exam:    Blood pressure (!) 165/91, pulse 92, temperature 97.7  F (36.5  C), temperature source Oral, resp. rate 18, height 1.702 m (5' 7\"), weight 64.7 kg (142 lb 10.2 oz), SpO2 99 %.     General: alert and cooperative, sitting in chair at bedside, no acute distress  HEENT: sclera anicteric, EOMI, MMM  CV: RRR, normal S1/S2, no m/r/g  Resp: CTAB, no wheezing/crackles, normal respiratory effort on ambient air  GI: soft, non-tender, non-distended, bowel sounds present and normoactive  MSK: warm and well-perfused, no edema  Skin: no rashes on limited exam, no jaundice  Neuro: AOx3, moves all extremities equally, no focal deficits, no tremor appreciated on exam  RUE PICC C/D/I    Labs & Studies: I personally reviewed the following studies:  ROUTINE LABS (Last four results):  CMP  Recent Labs   Lab 12/04/21  0738 12/03/21  0548 12/02/21  0552 12/01/21  1746 11/29/21  1053    141 140 139 141   POTASSIUM 3.2* 3.5 3.9 4.4 4.6   CHLORIDE 110* 109 108 106 106   CO2 28 27 28 27 29   ANIONGAP 6 5 4 6 6   GLC 82 102* 96 124* 91   BUN 14 18 20 22 20   CR 0.76 1.13 0.81 0.91 0.98   GFRESTIMATED >90 64 88 83 76   COTY 9.2 9.0 9.3 9.9 9.5   MAG 2.4* 2.3 2.4*  --  2.3   PHOS 2.7 2.4* 2.7 2.6 3.2   PROTTOTAL 6.0* 6.0* 6.3* 7.3 7.0   ALBUMIN 2.7* 2.6* " 2.7* 3.5 3.2*   BILITOTAL 0.3 0.2 0.4 0.4 0.5   ALKPHOS 374* 328* 351* 453* 442*   * 50* 52* 92* 113*   * 123* 147* 203* 137*     CBC  Recent Labs   Lab 12/04/21  0738 12/03/21  0548 12/02/21  0552 12/01/21  1746   WBC 10.4 12.1* 12.6* 13.5*   RBC 4.00* 4.04* 4.19* 4.84   HGB 11.0* 11.1* 11.4* 13.3   HCT 35.1* 35.8* 36.9* 43.0   MCV 88 89 88 89   MCH 27.5 27.5 27.2 27.5   MCHC 31.3* 31.0* 30.9* 30.9*   RDW 15.9* 15.8* 15.7* 15.8*    220 236 299     INR  Recent Labs   Lab 12/01/21  1730   INR 1.14       Microbiology  No results for input(s): LACT in the last 168 hours.    Medications list for reference:  Current Facility-Administered Medications   Medication     0.9% sodium chloride BOLUS     acetaminophen (TYLENOL) tablet 500-1,000 mg     albuterol (PROVENTIL HFA/VENTOLIN HFA) inhaler     albuterol (PROVENTIL) neb solution 2.5 mg     artificial saliva (BIOTENE MT) solution 1-2 spray     calcium carbonate (TUMS) chewable tablet 500 mg     celecoxib (celeBREX) capsule 200 mg     Chemotherapy Infusing-Continuous Infusion     diphenhydrAMINE (BENADRYL) injection 50 mg     enoxaparin ANTICOAGULANT (LOVENOX) injection 40 mg     EPINEPHrine PF (ADRENALIN) injection 0.3 mg     famotidine (PEPCID) injection 20 mg     guaiFENesin-codeine (ROBITUSSIN AC) 100-10 MG/5ML solution 10 mL     heparin lock flush 10 UNIT/ML injection 5-20 mL     heparin lock flush 10 UNIT/ML injection 5-20 mL     hydrocortisone (CORTEF) tablet 10 mg     hydrocortisone (CORTEF) tablet 20 mg     Ifosfamide (IFEX) 2,510 mg, mesna (MESNEX) 2,510 mg in sodium chloride 0.9 % 1,125 mL infusion     levothyroxine (SYNTHROID/LEVOTHROID) tablet 75 mcg     lidocaine (LMX4) cream     lidocaine 1 % 0.1-5 mL     LORazepam (ATIVAN) tablet 0.5 mg     menthol (Topical Analgesic) 2.5% (BENGAY VANISHING SCENT) 2.5 % topical gel     meperidine (DEMEROL) injection 25 mg     [START ON 12/7/2021] mesna (MESNEX) 2,510 mg in sodium chloride 0.9 % 1,075 mL  infusion     methylPREDNISolone sodium succinate (solu-MEDROL) injection 125 mg     metroNIDAZOLE (METROGEL) 0.75 % topical gel     naloxone (NARCAN) injection 0.2 mg     OLANZapine (zyPREXA) tablet 5 mg     ondansetron (ZOFRAN) injection 8 mg    Or     ondansetron (ZOFRAN-ODT) ODT tab 8 mg    Or     ondansetron (ZOFRAN) tablet 8 mg     ondansetron (ZOFRAN) tablet 8 mg     pantoprazole (PROTONIX) EC tablet 40 mg     polyethylene glycol (MIRALAX) Packet 17 g     prochlorperazine (COMPAZINE) tablet 5-10 mg    Or     prochlorperazine (COMPAZINE) injection 5-10 mg     prochlorperazine (COMPAZINE) tablet 5 mg     senna-docusate (SENOKOT-S/PERICOLACE) 8.6-50 MG per tablet 1-2 tablet    Or     senna-docusate (SENOKOT-S/PERICOLACE) 8.6-50 MG per tablet 2 tablet     sodium chloride (OCEAN) 0.65 % nasal spray 1 spray     sodium chloride (PF) 0.9% PF flush 10-20 mL     sodium chloride (PF) 0.9% PF flush 10-40 mL     sodium chloride (PF) 0.9% PF flush 10-40 mL     triamcinolone (KENALOG) 0.1 % cream     Vitamin D3 (CHOLECALCIFEROL) tablet 1,000 Units

## 2021-12-05 LAB
ALBUMIN SERPL-MCNC: 2.4 G/DL (ref 3.4–5)
ALP SERPL-CCNC: 378 U/L (ref 40–150)
ALT SERPL W P-5'-P-CCNC: 152 U/L (ref 0–70)
ANION GAP SERPL CALCULATED.3IONS-SCNC: 4 MMOL/L (ref 3–14)
AST SERPL W P-5'-P-CCNC: 73 U/L (ref 0–45)
BASOPHILS # BLD AUTO: 0.1 10E3/UL (ref 0–0.2)
BASOPHILS NFR BLD AUTO: 1 %
BILIRUB SERPL-MCNC: 0.5 MG/DL (ref 0.2–1.3)
BUN SERPL-MCNC: 18 MG/DL (ref 7–30)
CALCIUM SERPL-MCNC: 9.2 MG/DL (ref 8.5–10.1)
CHLORIDE BLD-SCNC: 110 MMOL/L (ref 94–109)
CO2 SERPL-SCNC: 28 MMOL/L (ref 20–32)
CREAT SERPL-MCNC: 0.78 MG/DL (ref 0.66–1.25)
EOSINOPHIL # BLD AUTO: 0.2 10E3/UL (ref 0–0.7)
EOSINOPHIL NFR BLD AUTO: 2 %
ERYTHROCYTE [DISTWIDTH] IN BLOOD BY AUTOMATED COUNT: 16.4 % (ref 10–15)
GFR SERPL CREATININE-BSD FRML MDRD: 90 ML/MIN/1.73M2
GLUCOSE BLD-MCNC: 93 MG/DL (ref 70–99)
HCT VFR BLD AUTO: 32.9 % (ref 40–53)
HGB BLD-MCNC: 10.5 G/DL (ref 13.3–17.7)
IMM GRANULOCYTES # BLD: 0.1 10E3/UL
IMM GRANULOCYTES NFR BLD: 1 %
LYMPHOCYTES # BLD AUTO: 1.7 10E3/UL (ref 0.8–5.3)
LYMPHOCYTES NFR BLD AUTO: 20 %
MAGNESIUM SERPL-MCNC: 2.3 MG/DL (ref 1.6–2.3)
MCH RBC QN AUTO: 28.2 PG (ref 26.5–33)
MCHC RBC AUTO-ENTMCNC: 31.9 G/DL (ref 31.5–36.5)
MCV RBC AUTO: 88 FL (ref 78–100)
MONOCYTES # BLD AUTO: 1.1 10E3/UL (ref 0–1.3)
MONOCYTES NFR BLD AUTO: 13 %
NEUTROPHILS # BLD AUTO: 5.5 10E3/UL (ref 1.6–8.3)
NEUTROPHILS NFR BLD AUTO: 63 %
NRBC # BLD AUTO: 0 10E3/UL
NRBC BLD AUTO-RTO: 0 /100
PHOSPHATE SERPL-MCNC: 2 MG/DL (ref 2.5–4.5)
PLATELET # BLD AUTO: 206 10E3/UL (ref 150–450)
POTASSIUM BLD-SCNC: 3.4 MMOL/L (ref 3.4–5.3)
POTASSIUM BLD-SCNC: 3.9 MMOL/L (ref 3.4–5.3)
PROT SERPL-MCNC: 5.7 G/DL (ref 6.8–8.8)
RBC # BLD AUTO: 3.73 10E6/UL (ref 4.4–5.9)
SODIUM SERPL-SCNC: 142 MMOL/L (ref 133–144)
WBC # BLD AUTO: 8.6 10E3/UL (ref 4–11)

## 2021-12-05 PROCEDURE — 36592 COLLECT BLOOD FROM PICC: CPT | Performed by: INTERNAL MEDICINE

## 2021-12-05 PROCEDURE — 250N000011 HC RX IP 250 OP 636: Performed by: PHYSICIAN ASSISTANT

## 2021-12-05 PROCEDURE — 85004 AUTOMATED DIFF WBC COUNT: CPT | Performed by: PHYSICIAN ASSISTANT

## 2021-12-05 PROCEDURE — 99232 SBSQ HOSP IP/OBS MODERATE 35: CPT | Performed by: STUDENT IN AN ORGANIZED HEALTH CARE EDUCATION/TRAINING PROGRAM

## 2021-12-05 PROCEDURE — 83735 ASSAY OF MAGNESIUM: CPT | Performed by: INTERNAL MEDICINE

## 2021-12-05 PROCEDURE — 250N000013 HC RX MED GY IP 250 OP 250 PS 637: Performed by: PHYSICIAN ASSISTANT

## 2021-12-05 PROCEDURE — 258N000003 HC RX IP 258 OP 636: Performed by: PHYSICIAN ASSISTANT

## 2021-12-05 PROCEDURE — 258N000003 HC RX IP 258 OP 636: Performed by: INTERNAL MEDICINE

## 2021-12-05 PROCEDURE — 80053 COMPREHEN METABOLIC PANEL: CPT | Performed by: PHYSICIAN ASSISTANT

## 2021-12-05 PROCEDURE — 250N000011 HC RX IP 250 OP 636: Performed by: INTERNAL MEDICINE

## 2021-12-05 PROCEDURE — 250N000009 HC RX 250: Performed by: INTERNAL MEDICINE

## 2021-12-05 PROCEDURE — 36592 COLLECT BLOOD FROM PICC: CPT | Performed by: PHYSICIAN ASSISTANT

## 2021-12-05 PROCEDURE — 84100 ASSAY OF PHOSPHORUS: CPT | Performed by: PHYSICIAN ASSISTANT

## 2021-12-05 PROCEDURE — 84132 ASSAY OF SERUM POTASSIUM: CPT | Performed by: INTERNAL MEDICINE

## 2021-12-05 PROCEDURE — 120N000002 HC R&B MED SURG/OB UMMC

## 2021-12-05 RX ORDER — POTASSIUM CHLORIDE 7.45 MG/ML
10 INJECTION INTRAVENOUS
Status: COMPLETED | OUTPATIENT
Start: 2021-12-05 | End: 2021-12-05

## 2021-12-05 RX ADMIN — METRONIDAZOLE: 7.5 GEL TOPICAL at 09:01

## 2021-12-05 RX ADMIN — PANTOPRAZOLE SODIUM 40 MG: 40 TABLET, DELAYED RELEASE ORAL at 09:01

## 2021-12-05 RX ADMIN — POTASSIUM PHOSPHATE, MONOBASIC POTASSIUM PHOSPHATE, DIBASIC 9 MMOL: 224; 236 INJECTION, SOLUTION, CONCENTRATE INTRAVENOUS at 12:25

## 2021-12-05 RX ADMIN — CELECOXIB 200 MG: 200 CAPSULE ORAL at 00:12

## 2021-12-05 RX ADMIN — POTASSIUM CHLORIDE 10 MEQ: 7.46 INJECTION, SOLUTION INTRAVENOUS at 11:20

## 2021-12-05 RX ADMIN — LEVOTHYROXINE SODIUM 75 MCG: 0.05 TABLET ORAL at 05:04

## 2021-12-05 RX ADMIN — METRONIDAZOLE: 7.5 GEL TOPICAL at 20:13

## 2021-12-05 RX ADMIN — HYDROCORTISONE 20 MG: 10 TABLET ORAL at 09:01

## 2021-12-05 RX ADMIN — MESNA 2510 MG: 100 INJECTION, SOLUTION INTRAVENOUS at 00:01

## 2021-12-05 RX ADMIN — HYDROCORTISONE 10 MG: 10 TABLET ORAL at 15:25

## 2021-12-05 RX ADMIN — PANTOPRAZOLE SODIUM 40 MG: 40 TABLET, DELAYED RELEASE ORAL at 20:13

## 2021-12-05 RX ADMIN — ONDANSETRON 8 MG: 4 TABLET, ORALLY DISINTEGRATING ORAL at 12:31

## 2021-12-05 RX ADMIN — Medication 1000 UNITS: at 09:01

## 2021-12-05 RX ADMIN — POTASSIUM CHLORIDE 10 MEQ: 7.46 INJECTION, SOLUTION INTRAVENOUS at 09:07

## 2021-12-05 RX ADMIN — CELECOXIB 200 MG: 200 CAPSULE ORAL at 11:20

## 2021-12-05 RX ADMIN — ONDANSETRON HYDROCHLORIDE 8 MG: 8 TABLET, FILM COATED ORAL at 20:13

## 2021-12-05 RX ADMIN — ONDANSETRON HYDROCHLORIDE 8 MG: 8 TABLET, FILM COATED ORAL at 05:04

## 2021-12-05 RX ADMIN — Medication 5 ML: at 06:48

## 2021-12-05 RX ADMIN — ONDANSETRON HYDROCHLORIDE 8 MG: 8 TABLET, FILM COATED ORAL at 11:21

## 2021-12-05 RX ADMIN — ENOXAPARIN SODIUM 40 MG: 40 INJECTION SUBCUTANEOUS at 20:13

## 2021-12-05 ASSESSMENT — ACTIVITIES OF DAILY LIVING (ADL)
ADLS_ACUITY_SCORE: 6
ADLS_ACUITY_SCORE: 4
ADLS_ACUITY_SCORE: 6
ADLS_ACUITY_SCORE: 4
ADLS_ACUITY_SCORE: 6

## 2021-12-05 ASSESSMENT — MIFFLIN-ST. JEOR: SCORE: 1377.68

## 2021-12-05 NOTE — PLAN OF CARE
[id number]  VSS. Alert and oriented. No complaints of pain. States that scheduled zofran is improving his nausea. Managed to drink protein shake last evening that his wife brought him. Dose #4 Ifos/Mesna hung. See chemo note. Sleeping between RN cares, no acute events.

## 2021-12-05 NOTE — PROGRESS NOTES
Chemotherapy  D: Blood return is present per PICC. Urine output is as recorded in intake and output flowsheet.   I: Dose #4 of Ifos/Mesna started to infuse over 24 hours.   A: Tolerating well.  P: Continue to monitor urine output and symptoms of nausea. Screen for symptoms of toxicity.

## 2021-12-05 NOTE — PLAN OF CARE
[id number]  Per pt bilateral hand tremors seem to be increasing in frequency but not severity. He notes that the tremors seem to be worse upon waking and decrease throughout the day. AVSS. Denied pain. C/O intermittent nausea, relieved with prn and scheduled antiemetics. Up ad kaitlynn in room and halls. Poor po intake r/t nausea and decreased appetite. Adequate urine output. LBM 12/5. Currently receiving day # 4 CIVI Ifosfamide and Mesna with brisk blood return noted from PICC q4 hours. Closely monitor for s/s of Ifosfamide toxicity, notify MD with any concerns and or changes in pt's condition. Continue with plan of care.

## 2021-12-05 NOTE — PROGRESS NOTES
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     Today:  - Day 5 Doxil/Ifos, tolerating well apart from mild bilateral hand tremor. Continue to monitor.   - Improvement in nausea after Zyprexa 5 mg at bedtime (12/3 - X) with scheduled Zofran.   - Follow up arranged as below.     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 Wolff. Patient presented to his PCP in 03/2021 with chest/left shoulder and back pain that led to imaging which revealed multiple lung mets, included a left pleural mass. There were difficulties in getting diagnostic biopsy; eventually, biopsy of the mediastinal mass (5/20/21) revealed a poorly characterized malignant spindle cell neoplasm with high PD-L1 expression (90%), Ki-67 70%. 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 Insufficient material to send [facility]. Baseline imaging (6/21/21) revealed progression of lung mets and left-sided subdiaphragmatic mass, stable liver lesions. He was seen by ENT for hoarseness, which was attributed to left true vocal fold motion impairment from mediastinal mass. Given high PD-L1 expression, he was started on Keytruda (C1=6/21/21). Interval imaging (CT 8/2/21) revealed positive response to treatment. He received 6 cycles total, most recently C6=10/4/21. Unfortunately, restaging imaging (10/18/21) revealed interval increase in size of the LUQ/splenic mass;  however, the mediastinal and left pleural mass were stable to slightly decreased in size. He met with Dr. Wolff, who recommended a change in therapy to Doxil + ifosfamide. He was admitted on 10/26/21 to receive Cycle #1 of Doxil/ifos which he tolerated reasonably well with no neurotoxicities, but did have moderate chemotherapy-induced nausea. After discharge he had significant mucositis, poor PO intake, nausea, and lyte derangements. Given adverse side effects, Cycle 2 was delayed by 1 week and doxil was dose reduced to 70mg (previously given 80mg, ie 45mg/m2), Ifosfamide reduced by 10% (1500mg/m2 ? 1350mg/m2). Admitted for Cycle 2 Doxil/Ifos 12/1/21.   - Restaging imaging 11/22/21 with stable/no significant change.   - PICC ordered on admission; remove on discharge.  - OK to start chemotherapy in the setting of ALT/AST elevation per Dr. Wolff  - Mild bilateral hand tremor noted 12/4. Worse with intention. Present intermittently. Will continue to monitor closely for other s/sx of neurotoxicity.                                                        Treatment Plan: Doxil + ifosfamide (C7E0=21/1/21).                            - Doxil 70mg IV once - D1                             - Ifosfamide/Mesna 1350 mg/m2 (2510 mg) CIVI - D1-6                             - Mesna 1350 mg/m2 (2510 mg) IV over 18 hrs - starting D7                             - Neulasta injection - D8, scheduled 12/9 at the [facility] prior to his appt with ENT appt same day, in the future will continue to schedule at [facility]                            - Emend 150mg once Day 1, will repeat on Day 6 if needed for nausea prior to discharge     # Cancer-related pain  - Continue PTA Celebrex 200 mg BID   - Continue PTA topical Bengay PRN at bedtime to left back      # Normocytic Anemia, mild  Noted intermittently. Likely related to chemo, hydration, frequent blood draws.  - Monitor  - Transfuse for Hgb <7, will sign blood consent if indicated     NEPH  #  Mild HUSSEIN, resolved  Baseline Cr ~0.8. On 12/3 his creatinine is 1.13. He is urinating well. During cycle 1 Doxil/Ifos his creatinine increased to 1.01. He notes increased nausea and isn't drinking much fluids due to nausea. HUSSEIN is likely due to hypovolemia. S/p 1L NS on 12/3 with improvement to 0.76. Stable as of 12/5.  - Continue to monitor. Bolus PRN.    FEN/GI  # History of transaminitis  , , TBili WNL as an outpatient on 11/29/21. PTA statin was subsequently held. Per outpatient notes he did not have any abdominal pain or other symptoms concerning for viral hepatitis. These lab values were reviewed with Dr. Wolff, and it was discussed that this could be due to his statin and OK to proceed with chemotherapy as planned above. He also has known liver metastases which are likely contributing.   - Continue to hold PTA statin  - Follow LFTs daily      # History of chemotherapy-induced nausea  - Scheduled Zofran Q8H  - PRN compazine and Ativan  - Emend 150 mg IV x1 dose on 12/1; would consider additional dose on Day 6 if needed for nausea prior to discharge  - Start Zyprexa 5mg at bedtime for persistent nausea despite zofran and compazine x12/3. Much improved. Will continue daily at bedtime.      # GERD  - Continue PTA omeprazole  - TUMS and Pepto Bismol available PRN      # History of Hypophosphatemia  # History of Hypokalemia  Secondary to Ifosfamide. Required additional phos and K replacement after discharge from Cycle 1 Doxil/Ifos.  - Follow Phos and K daily while inpatient, replete per lyte criteria and plan to discharge with neutraphos (ideally pills if available over packets)  - Replace Phos IV per lyte criteria while inpatient because pt doesn't like neutraphos packets  - Follow phos with twice weekly labs outpatient     CARDS  # CAD   Follows with Dr. Gigi Vernon at [facility]. He was noted to have coronary calcium on chest CT. CTA 9/29/21 with moderate mid LAD stenosis. Small  to moderate caliber ramus branch with severe diffuse disease.  Echo completed 9/29/21 with normal EF and no valvular dysfunction. Repeat ECHO on 10/27 with EF 60-65%, early diastolic dysfunction but otherwise no change from previous.  - PTA rosuvastatin 40 mg daily HELD for transaminitis, continue to hold on discharge  - Encourage outpatient cardiology follow-up as recommended     # History of elevated blood pressures  BPs ranging from normotensive to hypertensive (with SBP 140s-160) during previous admission. He states that he runs higher at baseline (though this is based on clinic assessments as he does not typically monitor his BP at home). Appears recent intermittent clinic BPs sit at 140s-150s/80s-90s when seen in person, otherwise many of the visits are currently virtual. He is not on any antihypertensive medications PTA. Euvolemic on admission. Suspect he has undiagnosed essential HTN, though BP this admission appears stable 110s/70s.   - Would recommend outpatient PCP follow-up for further discussion of BP monitoring and management, consider starting hydrochlorothiazide 12.5mg once daily      ENDO  # Panhypopituitarism  # Macroadenoma of the pituitary gland  Diagnosed in 2010. Found to have a pituitary macroadenoma, 1.9 cm in largest dimension. Underwent hypophysectomy on 8/23/10. Subsequent presumptive pituitary deficiency, on empiric meds for adrenal insufficiency and thyroid. Previously followed by Dr. Bill Mccall; plans to establish care with Dr. Jamir Grant on 11/11/21.  - Continue levothyroxine  - Continue hydrocortisone 15 mg qAM and 10 mg qPM  - Continue endocrinology follow-up as scheduled     ENT  # Vocal cord dysfunction  Manifested as hoarseness. Seen by ENT (Dr. Kyree Bearden) on 6/21/21 and noted to have left true vocal fold motion impairment from mediastinal mass. Underwent injection of ProLaryn (filler/bulking agent) on 7/1/21, which reportedly has been minimally beneficial. Seen in  follow-up by Dr. Bearden on 9/1/21 and noted to have minimal improvement; he was then referred to the [facility]   - No acute inpatient management issues  - Continue outpatient ENT follow-up as previously scheduled (next scheduled for 12/9/21)    # History of chemotherapy-related mucositis  Noted after C1 Doxil/Ifos. Was using salt/soda rinses, MMW PRN, and nystatin if he were to develop thrush. On admission his mucositis was noted to have resolved.   - Biotene spray PRN  - Salt/soda rinses, MMW PRN     DERM  # Rosacea  - Continue PTA metronidazole gel     PSYCH  # History of anxiety/depression  When physically feeling better his mood is improved, denies difficulty coping on admission.   - Previously placed health psych referral     FEN:  - Encouraged PO fluids, bolus PRN  - PRN lyte replacement  - RDAT     Prophy/Misc:  - VTE: ppx lovenox 40mg daily during admission   - GI/PUD: PTA omeprazole, Tums PRN  - Bowels: Senna and Miralax PRN  - Activity: Consider consulting PT if indicated  - COVID vaccination status: Completed 2 doses of Moderna (2nd dose given 4/15/21). Asymptomatic COVID screen negative on admission      Code: Full code  Disposition: Admitted to Oncology Team 3 service for scheduled chemotherapy. Anticipate discharge on 12/8 barring any other clinical complications  Follow up:   - Neulasta/labs on 12/9  - ENT appt 12/9  - Twice weekly labs including phosphorous level at [facility] 12[place]13-12/30. With future cycles he would prefer to have labs arranged at the [facility] but we were unable to arrange this presently  - Scheduled for an [facility]LE[facility] visit 12/15    Patient was seen and plan of care was discussed with attending physician, Dr. De La O.    Lauren Hairston PA-C  Hematology/Oncology  Pager #[phone]  ___________________________________________________________________    Subjective & Interval History:    No acute events noted overnight. Seen in late morning and states he is feeling well.  "Slept extremely well last night. Nausea better this morning and improved with zyprexa and zofran. Planning to brush his teeth, eat some breakfast and get ready for the [employer] game today. Stable bilateral upper extremity hand tremor, worse with intention. Limited to hands. No other new or worsening symptoms. Encouraged to continue eating and drinking well. Questions answered at bedside.     Physical Exam:    Blood pressure (!) 145/83, pulse 82, temperature 97.7  F (36.5  C), temperature source Oral, resp. rate 18, height 1.702 m (5' 7\"), weight 67.4 kg (148 lb 9.6 oz), SpO2 97 %.     General: alert and cooperative, sitting in chair at bedside, no acute distress  HEENT: sclera anicteric, EOMI, MMM  CV: RRR, normal S1/S2, no m/r/g  Resp: CTAB, no wheezing/crackles, normal respiratory effort on ambient air  GI: soft, non-tender, non-distended, bowel sounds present and normoactive  MSK: warm and well-perfused, no edema  Skin: no rashes on limited exam, no jaundice  Neuro: AOx3, moves all extremities equally, no focal deficits, no tremor appreciated on exam  RUE PICC C/D/I    Labs & Studies: I personally reviewed the following studies:  ROUTINE LABS (Last four results):  CMP  Recent Labs   Lab 12/05/21  0656 12/04/21  1615 12/04/21  0738 12/03/21  0548 12/02/21  0552     --  144 141 140   POTASSIUM 3.4 3.7 3.2* 3.5 3.9   CHLORIDE 110*  --  110* 109 108   CO2 28  --  28 27 28   ANIONGAP 4  --  6 5 4   GLC 93  --  82 102* 96   BUN 18  --  14 18 20   CR 0.78  --  0.76 1.13 0.81   GFRESTIMATED 90  --  >90 64 88   COTY 9.2  --  9.2 9.0 9.3   MAG 2.3  --  2.4* 2.3 2.4*   PHOS 2.0*  --  2.7 2.4* 2.7   PROTTOTAL 5.7*  --  6.0* 6.0* 6.3*   ALBUMIN 2.4*  --  2.7* 2.6* 2.7*   BILITOTAL 0.5  --  0.3 0.2 0.4   ALKPHOS 378*  --  374* 328* 351*   AST 73*  --  119* 50* 52*   *  --  188* 123* 147*     CBC  Recent Labs   Lab 12/05/21  0656 12/04/21  0738 12/03/21  0548 12/02/21  0552   WBC 8.6 10.4 12.1* 12.6*   RBC 3.73* " 4.00* 4.04* 4.19*   HGB 10.5* 11.0* 11.1* 11.4*   HCT 32.9* 35.1* 35.8* 36.9*   MCV 88 88 89 88   MCH 28.2 27.5 27.5 27.2   MCHC 31.9 31.3* 31.0* 30.9*   RDW 16.4* 15.9* 15.8* 15.7*    204 220 236     INR  Recent Labs   Lab 12/01/21  1730   INR 1.14       Microbiology  No results for input(s): LACT in the last 168 hours.    Medications list for reference:  Current Facility-Administered Medications   Medication     0.9% sodium chloride BOLUS     acetaminophen (TYLENOL) tablet 500-1,000 mg     albuterol (PROVENTIL HFA/VENTOLIN HFA) inhaler     albuterol (PROVENTIL) neb solution 2.5 mg     artificial saliva (BIOTENE MT) solution 1-2 spray     calcium carbonate (TUMS) chewable tablet 500 mg     celecoxib (celeBREX) capsule 200 mg     Chemotherapy Infusing-Continuous Infusion     diphenhydrAMINE (BENADRYL) injection 50 mg     enoxaparin ANTICOAGULANT (LOVENOX) injection 40 mg     EPINEPHrine PF (ADRENALIN) injection 0.3 mg     famotidine (PEPCID) injection 20 mg     guaiFENesin-codeine (ROBITUSSIN AC) 100-10 MG/5ML solution 10 mL     heparin lock flush 10 UNIT/ML injection 5-20 mL     heparin lock flush 10 UNIT/ML injection 5-20 mL     hydrocortisone (CORTEF) tablet 10 mg     hydrocortisone (CORTEF) tablet 20 mg     Ifosfamide (IFEX) 2,510 mg, mesna (MESNEX) 2,510 mg in sodium chloride 0.9 % 1,125 mL infusion     levothyroxine (SYNTHROID/LEVOTHROID) tablet 75 mcg     LORazepam (ATIVAN) tablet 0.5 mg     menthol (Topical Analgesic) 2.5% (BENGAY VANISHING SCENT) 2.5 % topical gel     meperidine (DEMEROL) injection 25 mg     [START ON 12/7/2021] mesna (MESNEX) 2,510 mg in sodium chloride 0.9 % 1,075 mL infusion     methylPREDNISolone sodium succinate (solu-MEDROL) injection 125 mg     metroNIDAZOLE (METROGEL) 0.75 % topical gel     naloxone (NARCAN) injection 0.2 mg     OLANZapine (zyPREXA) tablet 5 mg     ondansetron (ZOFRAN) injection 8 mg    Or     ondansetron (ZOFRAN-ODT) ODT tab 8 mg    Or     ondansetron  (ZOFRAN) tablet 8 mg     ondansetron (ZOFRAN) tablet 8 mg     pantoprazole (PROTONIX) EC tablet 40 mg     polyethylene glycol (MIRALAX) Packet 17 g     Potassium Phosphate 9 mMol/250 mL intermittent infusion 9 mmol     prochlorperazine (COMPAZINE) tablet 5-10 mg    Or     prochlorperazine (COMPAZINE) injection 5-10 mg     prochlorperazine (COMPAZINE) tablet 5 mg     senna-docusate (SENOKOT-S/PERICOLACE) 8.6-50 MG per tablet 1-2 tablet    Or     senna-docusate (SENOKOT-S/PERICOLACE) 8.6-50 MG per tablet 2 tablet     sodium chloride (OCEAN) 0.65 % nasal spray 1 spray     sodium chloride (PF) 0.9% PF flush 10-20 mL     sodium chloride (PF) 0.9% PF flush 10-40 mL     triamcinolone (KENALOG) 0.1 % cream     Vitamin D3 (CHOLECALCIFEROL) tablet 1,000 Units

## 2021-12-06 LAB
ALBUMIN SERPL-MCNC: 2.6 G/DL (ref 3.4–5)
ALP SERPL-CCNC: 399 U/L (ref 40–150)
ALT SERPL W P-5'-P-CCNC: 152 U/L (ref 0–70)
ANION GAP SERPL CALCULATED.3IONS-SCNC: 5 MMOL/L (ref 3–14)
AST SERPL W P-5'-P-CCNC: 78 U/L (ref 0–45)
BASOPHILS # BLD AUTO: 0.1 10E3/UL (ref 0–0.2)
BASOPHILS NFR BLD AUTO: 1 %
BILIRUB SERPL-MCNC: 0.3 MG/DL (ref 0.2–1.3)
BUN SERPL-MCNC: 17 MG/DL (ref 7–30)
CALCIUM SERPL-MCNC: 9 MG/DL (ref 8.5–10.1)
CHLORIDE BLD-SCNC: 111 MMOL/L (ref 94–109)
CO2 SERPL-SCNC: 26 MMOL/L (ref 20–32)
CREAT SERPL-MCNC: 0.8 MG/DL (ref 0.66–1.25)
EOSINOPHIL # BLD AUTO: 0.2 10E3/UL (ref 0–0.7)
EOSINOPHIL NFR BLD AUTO: 3 %
ERYTHROCYTE [DISTWIDTH] IN BLOOD BY AUTOMATED COUNT: 16.5 % (ref 10–15)
GFR SERPL CREATININE-BSD FRML MDRD: 89 ML/MIN/1.73M2
GLUCOSE BLD-MCNC: 88 MG/DL (ref 70–99)
HCT VFR BLD AUTO: 33.2 % (ref 40–53)
HGB BLD-MCNC: 10.3 G/DL (ref 13.3–17.7)
HOLD SPECIMEN: NORMAL
IMM GRANULOCYTES # BLD: 0 10E3/UL
IMM GRANULOCYTES NFR BLD: 1 %
LYMPHOCYTES # BLD AUTO: 1.5 10E3/UL (ref 0.8–5.3)
LYMPHOCYTES NFR BLD AUTO: 21 %
MAGNESIUM SERPL-MCNC: 2.4 MG/DL (ref 1.6–2.3)
MCH RBC QN AUTO: 27.3 PG (ref 26.5–33)
MCHC RBC AUTO-ENTMCNC: 31 G/DL (ref 31.5–36.5)
MCV RBC AUTO: 88 FL (ref 78–100)
MONOCYTES # BLD AUTO: 0.7 10E3/UL (ref 0–1.3)
MONOCYTES NFR BLD AUTO: 10 %
NEUTROPHILS # BLD AUTO: 4.5 10E3/UL (ref 1.6–8.3)
NEUTROPHILS NFR BLD AUTO: 64 %
NRBC # BLD AUTO: 0 10E3/UL
NRBC BLD AUTO-RTO: 0 /100
PHOSPHATE SERPL-MCNC: 2.4 MG/DL (ref 2.5–4.5)
PLATELET # BLD AUTO: 187 10E3/UL (ref 150–450)
POTASSIUM BLD-SCNC: 3.6 MMOL/L (ref 3.4–5.3)
PROT SERPL-MCNC: 5.8 G/DL (ref 6.8–8.8)
RBC # BLD AUTO: 3.77 10E6/UL (ref 4.4–5.9)
SODIUM SERPL-SCNC: 142 MMOL/L (ref 133–144)
WBC # BLD AUTO: 6.9 10E3/UL (ref 4–11)

## 2021-12-06 PROCEDURE — 120N000002 HC R&B MED SURG/OB UMMC

## 2021-12-06 PROCEDURE — 250N000011 HC RX IP 250 OP 636: Performed by: PHYSICIAN ASSISTANT

## 2021-12-06 PROCEDURE — 250N000013 HC RX MED GY IP 250 OP 250 PS 637: Performed by: PHYSICIAN ASSISTANT

## 2021-12-06 PROCEDURE — 99233 SBSQ HOSP IP/OBS HIGH 50: CPT | Performed by: INTERNAL MEDICINE

## 2021-12-06 PROCEDURE — 258N000003 HC RX IP 258 OP 636: Performed by: INTERNAL MEDICINE

## 2021-12-06 PROCEDURE — 84100 ASSAY OF PHOSPHORUS: CPT | Performed by: PHYSICIAN ASSISTANT

## 2021-12-06 PROCEDURE — 258N000003 HC RX IP 258 OP 636: Performed by: PHYSICIAN ASSISTANT

## 2021-12-06 PROCEDURE — 250N000009 HC RX 250: Performed by: INTERNAL MEDICINE

## 2021-12-06 PROCEDURE — 36592 COLLECT BLOOD FROM PICC: CPT | Performed by: PHYSICIAN ASSISTANT

## 2021-12-06 PROCEDURE — 85025 COMPLETE CBC W/AUTO DIFF WBC: CPT | Performed by: PHYSICIAN ASSISTANT

## 2021-12-06 PROCEDURE — 80053 COMPREHEN METABOLIC PANEL: CPT | Performed by: PHYSICIAN ASSISTANT

## 2021-12-06 PROCEDURE — 83735 ASSAY OF MAGNESIUM: CPT | Performed by: PHYSICIAN ASSISTANT

## 2021-12-06 RX ADMIN — OLANZAPINE 5 MG: 5 TABLET, FILM COATED ORAL at 22:29

## 2021-12-06 RX ADMIN — METRONIDAZOLE: 7.5 GEL TOPICAL at 09:00

## 2021-12-06 RX ADMIN — Medication 1000 UNITS: at 08:59

## 2021-12-06 RX ADMIN — ONDANSETRON HYDROCHLORIDE 8 MG: 8 TABLET, FILM COATED ORAL at 20:22

## 2021-12-06 RX ADMIN — POTASSIUM PHOSPHATE, MONOBASIC POTASSIUM PHOSPHATE, DIBASIC 9 MMOL: 224; 236 INJECTION, SOLUTION, CONCENTRATE INTRAVENOUS at 08:59

## 2021-12-06 RX ADMIN — PANTOPRAZOLE SODIUM 40 MG: 40 TABLET, DELAYED RELEASE ORAL at 09:00

## 2021-12-06 RX ADMIN — LEVOTHYROXINE SODIUM 75 MCG: 0.05 TABLET ORAL at 05:02

## 2021-12-06 RX ADMIN — DIBASIC SODIUM PHOSPHATE, MONOBASIC POTASSIUM PHOSPHATE AND MONOBASIC SODIUM PHOSPHATE 250 MG: 852; 155; 130 TABLET ORAL at 20:21

## 2021-12-06 RX ADMIN — LORAZEPAM 0.5 MG: 0.5 TABLET ORAL at 17:59

## 2021-12-06 RX ADMIN — CELECOXIB 200 MG: 200 CAPSULE ORAL at 00:09

## 2021-12-06 RX ADMIN — ONDANSETRON HYDROCHLORIDE 8 MG: 8 TABLET, FILM COATED ORAL at 12:08

## 2021-12-06 RX ADMIN — ONDANSETRON 8 MG: 4 TABLET, ORALLY DISINTEGRATING ORAL at 10:16

## 2021-12-06 RX ADMIN — PANTOPRAZOLE SODIUM 40 MG: 40 TABLET, DELAYED RELEASE ORAL at 20:21

## 2021-12-06 RX ADMIN — ENOXAPARIN SODIUM 40 MG: 40 INJECTION SUBCUTANEOUS at 20:22

## 2021-12-06 RX ADMIN — MESNA 2510 MG: 100 INJECTION, SOLUTION INTRAVENOUS at 01:07

## 2021-12-06 RX ADMIN — OLANZAPINE 5 MG: 5 TABLET, FILM COATED ORAL at 00:08

## 2021-12-06 RX ADMIN — HYDROCORTISONE 20 MG: 10 TABLET ORAL at 09:00

## 2021-12-06 RX ADMIN — CELECOXIB 200 MG: 200 CAPSULE ORAL at 12:08

## 2021-12-06 RX ADMIN — HYDROCORTISONE 10 MG: 10 TABLET ORAL at 17:03

## 2021-12-06 RX ADMIN — METRONIDAZOLE: 7.5 GEL TOPICAL at 20:22

## 2021-12-06 RX ADMIN — ONDANSETRON HYDROCHLORIDE 8 MG: 8 TABLET, FILM COATED ORAL at 05:02

## 2021-12-06 RX ADMIN — DIBASIC SODIUM PHOSPHATE, MONOBASIC POTASSIUM PHOSPHATE AND MONOBASIC SODIUM PHOSPHATE 250 MG: 852; 155; 130 TABLET ORAL at 12:08

## 2021-12-06 ASSESSMENT — ACTIVITIES OF DAILY LIVING (ADL)
ADLS_ACUITY_SCORE: 6

## 2021-12-06 ASSESSMENT — MIFFLIN-ST. JEOR: SCORE: 1359.98

## 2021-12-06 NOTE — PLAN OF CARE
[id number]  VSS. Alert and oriented. No complaints of pain. No nausea reported. Ifos/Mesna hung early this AM. See chemo note. Sleeping between RN cares. No acute events, continue with POC.   no

## 2021-12-06 NOTE — PROGRESS NOTES
Chemotherapy  D: Blood return is present per PICC catheter.  I: Dose #5 of Ifos/Mesna started to infuse over 24 hours.   A: Tolerating well.  P: Continue to monitor urine output and symptoms of nausea. Screen for symptoms of toxicity.

## 2021-12-06 NOTE — PROGRESS NOTES
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     Today:  - Day 6 of chemotherapy (Doxil/ifosfamide regimen); dose #5 of ifosfamide currently hanging.  - Schedule PO phosphorus tabs 250 mg BID; increase dose as indicated.  - Continue scheduled and PRN antiemetics, best supportive cares.  - Anticipate discharge to home on Wednesday, 12/8 PM, following completion of the chemotherapy regimen.     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 Wolff. Patient presented to his PCP in 03/2021 with chest/left shoulder and back pain that led to imaging which revealed multiple lung mets, included a left pleural mass. There were difficulties in getting diagnostic biopsy; eventually, biopsy of the mediastinal mass (5/20/21) revealed a poorly characterized malignant spindle cell neoplasm with high PD-L1 expression (90%), Ki-67 70%. 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 Insufficient material to send [facility]. Baseline imaging (6/21/21) revealed progression of lung mets and left-sided subdiaphragmatic mass, stable liver lesions. He was seen by ENT for hoarseness, which was attributed to left true vocal fold motion impairment from mediastinal mass. Given high PD-L1 expression, he was started on Keytruda (C1=6/21/21). Interval imaging (CT 8/2/21) revealed positive response to treatment. He received 6 cycles total, most recently  C6=10/4/21. Unfortunately, restaging imaging (10/18/21) revealed interval increase in size of the LUQ/splenic mass; however, the mediastinal and left pleural mass were stable to slightly decreased in size. He met with Dr. Wolff, who recommended a change in therapy to Doxil + ifosfamide. He was admitted on 10/26/21 to receive Cycle #1 of Doxil/ifos which he tolerated reasonably well with no neurotoxicities, but did have moderate chemotherapy-induced nausea. After discharge he had significant mucositis, poor PO intake, nausea, and lyte derangements. Given adverse side effects, Cycle 2 was delayed by 1 week and doxil was dose reduced to 70mg (previously given 80mg, ie 45mg/m2), Ifosfamide reduced by 10% (1500mg/m2 ? 1350mg/m2). Admitted for Cycle 2 Doxil/Ifos 12/1/21.   - Restaging imaging 11/22/21 with stable/no significant change.   - PICC ordered on admission; remove on discharge.  - OK to start chemotherapy in the setting of ALT/AST elevation per Dr. Wolff  - Concern raised for bilateral hand tremor on 12/4; on 12/6, no tremor noted. Patient did, however, report some intermittent myoclonic jerking of the upper extremities. No other s/sx of neurotoxicity; continue Q8H neuro checks per usual protocol.                                                        Treatment Plan: Doxil + ifosfamide (S4G0=55/1/21).                            - Doxil 70mg IV once - D1                             - Ifosfamide/Mesna 1350 mg/m2 (2510 mg) CIVI - D1-6                             - Mesna 1350 mg/m2 (2510 mg) IV over 18 hrs - starting D7                             - Neulasta injection - D8, scheduled 12/9 at the [facility] [place] prior to his appt with ENT appt same day, in the future will continue to schedule at [facility] [place]                            - Emend 150 mg once Day 1, will repeat on Day 6 if needed for nausea prior to discharge     # Cancer-related pain  - Continue PTA Celebrex 200 mg BID   - Continue PTA topical Bengay PRN at bedtime  to left back      # Normocytic anemia, mild  Noted intermittently. Likely related to chemo, hydration, frequent blood draws.  - Monitor  - Transfuse for Hgb <7, will sign blood consent if indicated     RENAL/FEN  # Mild HUSSEIN, resolved  Baseline Cr ~0.8, which increased to 1.13 on 12/3. Similar transient increase noted during C1, presumed due to hypovolemia in the setting of nausea with poor PO intake. S/p 1L NS on 12/3 with subsequent improvement in Cr.  - Follow daily CMP  - No mIVF; bolus PRN.    # Hypophosphatemia, intermittent  # Hypokalemia, intermittent  Secondary to ifosfamide. Required additional phos and K replacement after discharge from C1 Doxil/Ifos.  - Follow Phos and K daily while inpatient  - Start PO phosphorus 250 mg BID; increase dose as indicated  - Replete additional per standing protocol  - Follow BMP and phos with twice weekly labs outpatient    GI  # Transaminitis  Noted in late Nov: , , TBili WNL on 11/29/21. PTA statin was subsequently held. Per outpatient notes, he did not have any abdominal pain or other symptoms concerning for viral hepatitis. Discussed with Dr. Wolff, and it was discussed that this could be due to his statin and OK to proceed with chemotherapy as planned above. He also has known liver metastases which are likely contributing.   - Continue to hold PTA statin  - Follow LFTs daily      # History of chemotherapy-induced nausea  Improved with the following regimen:  - Scheduled Zofran Q8H  - PRN compazine and Ativan  - Emend 150 mg IV x1 dose on 12/1; consider repeating x1 if nausea persists  - Zyprexa 5 mg at bedtime      # GERD  - Continue PTA omeprazole  - TUMS and Pepto Bismol available PRN      CV  # CAD   Followed by Dr. Gigi Vernon at [facility]. He was noted to have coronary calcium on chest CT. CTA 9/29/21 with moderate mid LAD stenosis. Small to moderate caliber ramus branch with severe diffuse disease. Echo completed 9/29/21 with  normal EF and no valvular dysfunction. Repeat ECHO on 10/27 with EF 60-65%, early diastolic dysfunction but otherwise no change from previous.  - PTA rosuvastatin 40 mg daily HELD for transaminitis, continue to hold on discharge  - Encourage outpatient cardiology follow-up as recommended     # Intermittent elevated blood pressures  BPs ranging from normotensive to hypertensive (with SBP 140s-160) during previous admission. He states that he runs higher at baseline (though this is based on clinic assessments as he does not typically monitor his BP at home). Appears recent intermittent clinic BPs sit at 140s-150s/80s-90s when seen in person, otherwise many of the visits are currently virtual. He is not on any antihypertensive medications PTA. Euvolemic on admission. Suspect he has undiagnosed essential HTN, though BP this admission appears stable 110s/70s.   - Would recommend outpatient PCP follow-up for further discussion of BP monitoring and management     ENDO  # Panhypopituitarism  # Macroadenoma of the pituitary gland  Diagnosed in 2010. Found to have a pituitary macroadenoma, 1.9 cm in largest dimension. Underwent hypophysectomy on 8/23/10. Subsequent presumptive pituitary deficiency, on empiric meds for adrenal insufficiency and thyroid. Previously followed by Dr. Bill Mccall; plans to establish care with Dr. Jamir Grant on 11/11/21.  - Continue levothyroxine  - Continue hydrocortisone 15 mg qAM and 10 mg qPM  - Continue endocrinology follow-up as scheduled     ENT  # Vocal cord dysfunction  Manifested as hoarseness. Seen by ENT (Dr. Kyree Bearden) on 6/21/21 and noted to have left true vocal fold motion impairment from mediastinal mass. Underwent injection of ProLaryn (filler/bulking agent) on 7/1/21, which reportedly has been minimally beneficial. Seen in follow-up by Dr. Bearden on 9/1/21 and noted to have minimal improvement; he was then referred to the [facility] ([facility]).   - No acute  inpatient management issues  - Continue outpatient ENT follow-up as previously scheduled (next scheduled for 12/9/21)    # History of chemotherapy-related mucositis  Noted after C1 Doxil/Ifos. Was using salt/soda rinses, MMW PRN, and nystatin if he were to develop thrush. On admission his mucositis was noted to have resolved.   - Biotene spray PRN  - Salt/soda rinses, MMW PRN     DERM  # Rosacea  - Continue PTA metronidazole gel     PSYCH  # History of anxiety/depression  Situational, related to malaise/illness. Previously placed health psych referral.  - No acute inpatient management needs     FEN:  - Encouraged PO fluids, bolus PRN  - PRN lyte replacement  - RDAT     Prophy/Misc:  - VTE: ppx lovenox 40mg daily during admission   - GI/PUD: PTA omeprazole, Tums PRN  - Bowels: Senna and Miralax PRN  - Activity: Consider consulting PT if indicated  - COVID vaccination status: Completed 2 doses of Moderna (2nd dose given 4/15/21). Asymptomatic COVID screen negative on admission      Code: Full code  Disposition: Admitted to Oncology Team 3 service for scheduled chemotherapy. Anticipate discharge on 12/8 barring any other clinical complications  Follow up:   - Neulasta/labs on 12/9  - ENT appt 12/9  - Twice weekly labs including phosphorous level at [facility] 12/13-12/30. Of note, with future cycles he would prefer to have labs arranged at the [facility] clinic.  - Scheduled for an MARLEEN visit 12/15    Staffed with Dr. Crow.     Rayna Martin PA-C  Hematology/Oncology  Pager: [phone]  ___________________________________________________________________    Subjective & Interval History:    No acute events noted overnight. Ifrah reports he slept well last night. Nausea well-controlled upon awakening. He denies headaches, vision changes, numbness/tingling, weakness, or gait disturbance. No fevers or chills. Mild sore throat, no worsening. Doing s/s swishes and using Biotene. Reports some intermittent twitching  "motions of the upper extremities yesterday, but none today. Denies tremors. Looking forward to going home.    Physical Exam:    Blood pressure (!) 145/88, pulse 77, temperature (!) 96.3  F (35.7  C), temperature source Oral, resp. rate 20, height 1.702 m (5' 7\"), weight 65.6 kg (144 lb 11.2 oz), SpO2 99 %.     General: alert and cooperative, sitting in chair at bedside, no acute distress  HEENT: sclera anicteric, EOMI, MMM  CV: RRR, normal S1/S2, no m/r/g  Resp: CTAB, no wheezing/crackles, normal respiratory effort on ambient air  GI: soft, non-tender, non-distended, bowel sounds present and normoactive  MSK: warm and well-perfused, no edema  Skin: no rashes on limited exam, no jaundice  Neuro: AOx3, chronic right hemifacial spasm noted, normal speech without dysarthria, moves all extremities equally, normal gait and stance, no resting upper extremity tremor, no appreciable myoclonus, normal finger-nose-finger  Vascular access: RUE PICC C/D/I, no surrounding erythema or ecchymosis    Labs & Studies: I personally reviewed the following studies:  ROUTINE LABS (Last four results):  CMP  Recent Labs   Lab 12/06/21  0656 12/05/21  1418 12/05/21  0656 12/04/21  1615 12/04/21  0738 12/03/21  0548     --  142  --  144 141   POTASSIUM 3.6 3.9 3.4 3.7 3.2* 3.5   CHLORIDE 111*  --  110*  --  110* 109   CO2 26  --  28  --  28 27   ANIONGAP 5  --  4  --  6 5   GLC 88  --  93  --  82 102*   BUN 17  --  18  --  14 18   CR 0.80  --  0.78  --  0.76 1.13   GFRESTIMATED 89  --  90  --  >90 64   COTY 9.0  --  9.2  --  9.2 9.0   MAG 2.4*  --  2.3  --  2.4* 2.3   PHOS 2.4*  --  2.0*  --  2.7 2.4*   PROTTOTAL 5.8*  --  5.7*  --  6.0* 6.0*   ALBUMIN 2.6*  --  2.4*  --  2.7* 2.6*   BILITOTAL 0.3  --  0.5  --  0.3 0.2   ALKPHOS 399*  --  378*  --  374* 328*   AST 78*  --  73*  --  119* 50*   *  --  152*  --  188* 123*     CBC  Recent Labs   Lab 12/06/21  0656 12/05/21  0656 12/04/21  0738 12/03/21  0548   WBC 6.9 8.6 10.4 12.1* "   RBC 3.77* 3.73* 4.00* 4.04*   HGB 10.3* 10.5* 11.0* 11.1*   HCT 33.2* 32.9* 35.1* 35.8*   MCV 88 88 88 89   MCH 27.3 28.2 27.5 27.5   MCHC 31.0* 31.9 31.3* 31.0*   RDW 16.5* 16.4* 15.9* 15.8*    206 204 220     INR  Recent Labs   Lab 12/01/21  1730   INR 1.14       Microbiology  No results for input(s): LACT in the last 168 hours.    Medications list for reference:  Current Facility-Administered Medications   Medication     0.9% sodium chloride BOLUS     acetaminophen (TYLENOL) tablet 500-1,000 mg     albuterol (PROVENTIL HFA/VENTOLIN HFA) inhaler     albuterol (PROVENTIL) neb solution 2.5 mg     artificial saliva (BIOTENE MT) solution 1-2 spray     calcium carbonate (TUMS) chewable tablet 500 mg     celecoxib (celeBREX) capsule 200 mg     Chemotherapy Infusing-Continuous Infusion     diphenhydrAMINE (BENADRYL) injection 50 mg     enoxaparin ANTICOAGULANT (LOVENOX) injection 40 mg     EPINEPHrine PF (ADRENALIN) injection 0.3 mg     famotidine (PEPCID) injection 20 mg     guaiFENesin-codeine (ROBITUSSIN AC) 100-10 MG/5ML solution 10 mL     heparin lock flush 10 UNIT/ML injection 5-20 mL     heparin lock flush 10 UNIT/ML injection 5-20 mL     hydrocortisone (CORTEF) tablet 10 mg     hydrocortisone (CORTEF) tablet 20 mg     Ifosfamide (IFEX) 2,510 mg, mesna (MESNEX) 2,510 mg in sodium chloride 0.9 % 1,125 mL infusion     levothyroxine (SYNTHROID/LEVOTHROID) tablet 75 mcg     LORazepam (ATIVAN) tablet 0.5 mg     menthol (Topical Analgesic) 2.5% (BENGAY VANISHING SCENT) 2.5 % topical gel     meperidine (DEMEROL) injection 25 mg     [START ON 12/7/2021] mesna (MESNEX) 2,510 mg in sodium chloride 0.9 % 1,075 mL infusion     methylPREDNISolone sodium succinate (solu-MEDROL) injection 125 mg     metroNIDAZOLE (METROGEL) 0.75 % topical gel     naloxone (NARCAN) injection 0.2 mg     OLANZapine (zyPREXA) tablet 5 mg     ondansetron (ZOFRAN) injection 8 mg    Or     ondansetron (ZOFRAN-ODT) ODT tab 8 mg    Or      ondansetron (ZOFRAN) tablet 8 mg     ondansetron (ZOFRAN) tablet 8 mg     pantoprazole (PROTONIX) EC tablet 40 mg     phosphorus tablet 250 mg (PHOSPHA 250 NEUTRAL) per tablet 250 mg     polyethylene glycol (MIRALAX) Packet 17 g     Potassium Phosphate 9 mMol/250 mL intermittent infusion 9 mmol     prochlorperazine (COMPAZINE) tablet 5-10 mg    Or     prochlorperazine (COMPAZINE) injection 5-10 mg     prochlorperazine (COMPAZINE) tablet 5 mg     senna-docusate (SENOKOT-S/PERICOLACE) 8.6-50 MG per tablet 1-2 tablet    Or     senna-docusate (SENOKOT-S/PERICOLACE) 8.6-50 MG per tablet 2 tablet     sodium chloride (OCEAN) 0.65 % nasal spray 1 spray     sodium chloride (PF) 0.9% PF flush 10-20 mL     sodium chloride (PF) 0.9% PF flush 10-40 mL     triamcinolone (KENALOG) 0.1 % cream     Vitamin D3 (CHOLECALCIFEROL) tablet 1,000 Units     Rayna Martin PA-C      ------------------------------------------------------------------------------------  PHYSICIAN ATTESTATION     I, Brice Crow, saw and evaluated Ifrah Huitron as part of a shared visit.  I have reviewed and discussed with the advanced practice provider their history, physical and plan.     I personally reviewed the vital signs, medications, labs and imaging.     My key history or physical exam findings:  Patient tolerating chemotherapy well thus far.  No physical examination abnormalities.  No oral pain/ mucositis today.  Mild nausea, improved with antiemetics.  No CNS or PNS toxicities.  Labs show mild (stable) transaminitis and alkaline phosphatase elevation.  Serum phosphorus is low; will replete.     Key management decisions made by me:  Continue with planned inpatient chemotherapy; doxil + ifosfamide (with 10% dose reduction of both agents).  Day #5 today.  No changes to dose or schedule.  Anticipated discharge on Wednesday evening.    I spent a total of 40 minutes on this patient on the day of the encounter, of which more than 50% of this time was  used for counselling and coordination of care.     Brice Crow M.D.  Date of Service (when I saw the patient): 12/06/21

## 2021-12-06 NOTE — PLAN OF CARE
Hypertensive, OVSS. Denied pain. C/O intermittent nausea, relieved with scheduled and prn antiemetics. Up ad kaitlynn in room and halls. Fair po intake. Supplements discontinued per pt request. Adequate urine output. LBM 12/5. Phosphorus replaced, recheck with morning labs. Currently receiving dose # 5 Ifosfamide and Mesna with brisk blood return noted from PICC q4 hours. Mild bilateral hand tremors unchanged per pt report. Closely monitor for s/s of neurotoxicity r/t Ifosfamide. Notify MD with any concerns and or changes in pt's condition.

## 2021-12-07 DIAGNOSIS — Z51.89 ENCOUNTER FOR OTHER SPECIFIED AFTERCARE: ICD-10-CM

## 2021-12-07 LAB
ALBUMIN SERPL-MCNC: 2.7 G/DL (ref 3.4–5)
ALP SERPL-CCNC: 379 U/L (ref 40–150)
ALT SERPL W P-5'-P-CCNC: 128 U/L (ref 0–70)
ANION GAP SERPL CALCULATED.3IONS-SCNC: 5 MMOL/L (ref 3–14)
AST SERPL W P-5'-P-CCNC: 56 U/L (ref 0–45)
BASOPHILS # BLD AUTO: 0.1 10E3/UL (ref 0–0.2)
BASOPHILS NFR BLD AUTO: 1 %
BILIRUB SERPL-MCNC: 0.4 MG/DL (ref 0.2–1.3)
BUN SERPL-MCNC: 20 MG/DL (ref 7–30)
CALCIUM SERPL-MCNC: 9.2 MG/DL (ref 8.5–10.1)
CHLORIDE BLD-SCNC: 112 MMOL/L (ref 94–109)
CO2 SERPL-SCNC: 24 MMOL/L (ref 20–32)
CREAT SERPL-MCNC: 0.77 MG/DL (ref 0.66–1.25)
EOSINOPHIL # BLD AUTO: 0.2 10E3/UL (ref 0–0.7)
EOSINOPHIL NFR BLD AUTO: 2 %
ERYTHROCYTE [DISTWIDTH] IN BLOOD BY AUTOMATED COUNT: 16.5 % (ref 10–15)
GFR SERPL CREATININE-BSD FRML MDRD: 90 ML/MIN/1.73M2
GLUCOSE BLD-MCNC: 93 MG/DL (ref 70–99)
HCT VFR BLD AUTO: 34.2 % (ref 40–53)
HGB BLD-MCNC: 10.7 G/DL (ref 13.3–17.7)
HOLD SPECIMEN: NORMAL
IMM GRANULOCYTES # BLD: 0 10E3/UL
IMM GRANULOCYTES NFR BLD: 0 %
LYMPHOCYTES # BLD AUTO: 1.5 10E3/UL (ref 0.8–5.3)
LYMPHOCYTES NFR BLD AUTO: 23 %
MAGNESIUM SERPL-MCNC: 2.5 MG/DL (ref 1.6–2.3)
MCH RBC QN AUTO: 27.6 PG (ref 26.5–33)
MCHC RBC AUTO-ENTMCNC: 31.3 G/DL (ref 31.5–36.5)
MCV RBC AUTO: 88 FL (ref 78–100)
MONOCYTES # BLD AUTO: 0.4 10E3/UL (ref 0–1.3)
MONOCYTES NFR BLD AUTO: 6 %
NEUTROPHILS # BLD AUTO: 4.5 10E3/UL (ref 1.6–8.3)
NEUTROPHILS NFR BLD AUTO: 68 %
NRBC # BLD AUTO: 0 10E3/UL
NRBC BLD AUTO-RTO: 0 /100
PHOSPHATE SERPL-MCNC: 2.6 MG/DL (ref 2.5–4.5)
PLATELET # BLD AUTO: 178 10E3/UL (ref 150–450)
POTASSIUM BLD-SCNC: 3.4 MMOL/L (ref 3.4–5.3)
POTASSIUM BLD-SCNC: 3.7 MMOL/L (ref 3.4–5.3)
PROT SERPL-MCNC: 6.2 G/DL (ref 6.8–8.8)
RBC # BLD AUTO: 3.88 10E6/UL (ref 4.4–5.9)
SODIUM SERPL-SCNC: 141 MMOL/L (ref 133–144)
WBC # BLD AUTO: 6.7 10E3/UL (ref 4–11)

## 2021-12-07 PROCEDURE — 250N000013 HC RX MED GY IP 250 OP 250 PS 637: Performed by: PHYSICIAN ASSISTANT

## 2021-12-07 PROCEDURE — 36592 COLLECT BLOOD FROM PICC: CPT | Performed by: PHYSICIAN ASSISTANT

## 2021-12-07 PROCEDURE — 120N000002 HC R&B MED SURG/OB UMMC

## 2021-12-07 PROCEDURE — 36592 COLLECT BLOOD FROM PICC: CPT | Performed by: INTERNAL MEDICINE

## 2021-12-07 PROCEDURE — 82040 ASSAY OF SERUM ALBUMIN: CPT | Performed by: PHYSICIAN ASSISTANT

## 2021-12-07 PROCEDURE — 99233 SBSQ HOSP IP/OBS HIGH 50: CPT | Performed by: INTERNAL MEDICINE

## 2021-12-07 PROCEDURE — 83735 ASSAY OF MAGNESIUM: CPT | Performed by: INTERNAL MEDICINE

## 2021-12-07 PROCEDURE — 250N000011 HC RX IP 250 OP 636: Performed by: PHYSICIAN ASSISTANT

## 2021-12-07 PROCEDURE — 84100 ASSAY OF PHOSPHORUS: CPT | Performed by: PHYSICIAN ASSISTANT

## 2021-12-07 PROCEDURE — 258N000003 HC RX IP 258 OP 636: Performed by: PHYSICIAN ASSISTANT

## 2021-12-07 PROCEDURE — 84132 ASSAY OF SERUM POTASSIUM: CPT | Performed by: INTERNAL MEDICINE

## 2021-12-07 PROCEDURE — 250N000011 HC RX IP 250 OP 636: Performed by: INTERNAL MEDICINE

## 2021-12-07 PROCEDURE — 85025 COMPLETE CBC W/AUTO DIFF WBC: CPT | Performed by: PHYSICIAN ASSISTANT

## 2021-12-07 RX ORDER — MAGNESIUM HYDROXIDE/ALUMINUM HYDROXICE/SIMETHICONE 120; 1200; 1200 MG/30ML; MG/30ML; MG/30ML
30 SUSPENSION ORAL EVERY 4 HOURS PRN
Status: DISCONTINUED | OUTPATIENT
Start: 2021-12-07 | End: 2021-12-08 | Stop reason: HOSPADM

## 2021-12-07 RX ORDER — POTASSIUM CHLORIDE 29.8 MG/ML
20 INJECTION INTRAVENOUS
Status: COMPLETED | OUTPATIENT
Start: 2021-12-07 | End: 2021-12-07

## 2021-12-07 RX ORDER — NYSTATIN 100000/ML
1000000 SUSPENSION, ORAL (FINAL DOSE FORM) ORAL 4 TIMES DAILY
Status: DISCONTINUED | OUTPATIENT
Start: 2021-12-07 | End: 2021-12-08 | Stop reason: HOSPADM

## 2021-12-07 RX ADMIN — HYDROCORTISONE 20 MG: 10 TABLET ORAL at 08:52

## 2021-12-07 RX ADMIN — NYSTATIN 1000000 UNITS: 100000 SUSPENSION ORAL at 11:36

## 2021-12-07 RX ADMIN — Medication 1000 UNITS: at 08:52

## 2021-12-07 RX ADMIN — CALCIUM CARBONATE 500 MG: 500 TABLET, CHEWABLE ORAL at 14:32

## 2021-12-07 RX ADMIN — OLANZAPINE 5 MG: 5 TABLET, FILM COATED ORAL at 22:38

## 2021-12-07 RX ADMIN — ONDANSETRON 8 MG: 4 TABLET, ORALLY DISINTEGRATING ORAL at 14:32

## 2021-12-07 RX ADMIN — ONDANSETRON HYDROCHLORIDE 8 MG: 8 TABLET, FILM COATED ORAL at 11:36

## 2021-12-07 RX ADMIN — LEVOTHYROXINE SODIUM 75 MCG: 0.05 TABLET ORAL at 06:08

## 2021-12-07 RX ADMIN — MESNA 2510 MG: 100 INJECTION, SOLUTION INTRAVENOUS at 00:45

## 2021-12-07 RX ADMIN — METRONIDAZOLE: 7.5 GEL TOPICAL at 20:29

## 2021-12-07 RX ADMIN — HYDROCORTISONE 10 MG: 10 TABLET ORAL at 14:20

## 2021-12-07 RX ADMIN — METRONIDAZOLE: 7.5 GEL TOPICAL at 08:51

## 2021-12-07 RX ADMIN — DIBASIC SODIUM PHOSPHATE, MONOBASIC POTASSIUM PHOSPHATE AND MONOBASIC SODIUM PHOSPHATE 250 MG: 852; 155; 130 TABLET ORAL at 08:52

## 2021-12-07 RX ADMIN — CELECOXIB 200 MG: 200 CAPSULE ORAL at 22:38

## 2021-12-07 RX ADMIN — NYSTATIN 1000000 UNITS: 100000 SUSPENSION ORAL at 20:29

## 2021-12-07 RX ADMIN — Medication 5 ML: at 14:24

## 2021-12-07 RX ADMIN — NYSTATIN 1000000 UNITS: 100000 SUSPENSION ORAL at 16:46

## 2021-12-07 RX ADMIN — PROCHLORPERAZINE MALEATE 10 MG: 5 TABLET ORAL at 09:00

## 2021-12-07 RX ADMIN — DIBASIC SODIUM PHOSPHATE, MONOBASIC POTASSIUM PHOSPHATE AND MONOBASIC SODIUM PHOSPHATE 250 MG: 852; 155; 130 TABLET ORAL at 20:26

## 2021-12-07 RX ADMIN — CELECOXIB 200 MG: 200 CAPSULE ORAL at 08:52

## 2021-12-07 RX ADMIN — POTASSIUM CHLORIDE 20 MEQ: 29.8 INJECTION, SOLUTION INTRAVENOUS at 08:00

## 2021-12-07 RX ADMIN — PANTOPRAZOLE SODIUM 40 MG: 40 TABLET, DELAYED RELEASE ORAL at 20:28

## 2021-12-07 RX ADMIN — ONDANSETRON HYDROCHLORIDE 8 MG: 8 TABLET, FILM COATED ORAL at 04:32

## 2021-12-07 RX ADMIN — PANTOPRAZOLE SODIUM 40 MG: 40 TABLET, DELAYED RELEASE ORAL at 08:52

## 2021-12-07 RX ADMIN — Medication 5 ML: at 18:56

## 2021-12-07 RX ADMIN — ONDANSETRON HYDROCHLORIDE 8 MG: 8 TABLET, FILM COATED ORAL at 20:28

## 2021-12-07 RX ADMIN — POTASSIUM CHLORIDE 20 MEQ: 29.8 INJECTION, SOLUTION INTRAVENOUS at 08:54

## 2021-12-07 RX ADMIN — ALUMINUM HYDROXIDE, MAGNESIUM HYDROXIDE, AND SIMETHICONE 30 ML: 200; 200; 20 SUSPENSION ORAL at 11:36

## 2021-12-07 RX ADMIN — ENOXAPARIN SODIUM 40 MG: 40 INJECTION SUBCUTANEOUS at 20:29

## 2021-12-07 ASSESSMENT — ACTIVITIES OF DAILY LIVING (ADL)
ADLS_ACUITY_SCORE: 6

## 2021-12-07 ASSESSMENT — MIFFLIN-ST. JEOR: SCORE: 1360.44

## 2021-12-07 NOTE — PLAN OF CARE
"1500 - 2300:   BP (!) 153/83 (BP Location: Left arm)   Pulse 78   Temp 97.8  F (36.6  C) (Oral)   Resp 18   Ht 1.702 m (5' 7\")   Wt 65.6 kg (144 lb 11.2 oz)   SpO2 98%   BMI 22.66 kg/m      CIVI D#5 Ifos/Mesna infusing via picc, good blood return, tolerating well ex mild nausea managing with antiemetics.  A&O x 4, AVSS ex SBP in 150's, c/o intermittent nausea, gave ativan 0.5mg w/ relief from nausea. Gave scheduled zofran at 8PM.  On phosphorus PO replacement and recheck tomorrow AM lab.  Denies pain/sob, up independently in solano/room, voiding spontaneously, had a bm today.  Continue with poc...  "

## 2021-12-07 NOTE — PLAN OF CARE
AVSS. Intermittent nausea partially relieved with scheduled and prn antiemetics, no emesis. C/O abdominal discomfort, partially relieved with tums and maalox. Up ad kaitlynn in room and halls. No po intake today except for sips of clears r/t nausea, abdominal pain and decreased appetite. Adequate urine output. BM today. Potassium replaced, recheck 3.7. Currently receiving day # 6 Ifosfamide and Mesna with brisk blood return noted from PICC q4 hours. Plan to discharge to home tomorrow after the completion of day 7 Mesna.

## 2021-12-07 NOTE — PROGRESS NOTES
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     Today:  - Day 7 of chemotherapy (Doxil/ifosfamide regimen); Anticipate discharge 12/8 at 2:30pm (Mesna over 16 hours)  - Pt notes ongoing nausea/indigestion, trial Maalox PRN. Continue other scheduled and PRN anti-emetics  - Nystatin swish/spit QID for early onset oral thrush (12/7-continue)  - Continue PO phosphorus tabs 250 mg BID; increase dose as indicated.     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 Wolff. Patient presented to his PCP in 03/2021 with chest/left shoulder and back pain that led to imaging which revealed multiple lung mets, included a left pleural mass. There were difficulties in getting diagnostic biopsy; eventually, biopsy of the mediastinal mass (5/20/21) revealed a poorly characterized malignant spindle cell neoplasm with high PD-L1 expression (90%), Ki-67 70%. 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 Insufficient material to send [facility]. Baseline imaging (6/21/21) revealed progression of lung mets and left-sided subdiaphragmatic mass, stable liver lesions. He was seen by ENT for hoarseness, which was attributed to left true vocal fold motion impairment from mediastinal mass. Given high PD-L1 expression, he was started on Keytruda (C1=6/21/21). Interval imaging (CT 8/2/21) revealed positive response to treatment. He received 6 cycles  total, most recently C6=10/4/21. Unfortunately, restaging imaging (10/18/21) revealed interval increase in size of the LUQ/splenic mass; however, the mediastinal and left pleural mass were stable to slightly decreased in size. He met with Dr. Wolff, who recommended a change in therapy to Doxil + ifosfamide. He was admitted on 10/26/21 to receive Cycle #1 of Doxil/ifos which he tolerated reasonably well with no neurotoxicities, but did have moderate chemotherapy-induced nausea. After discharge he had significant mucositis, poor PO intake, nausea, and lyte derangements. Given adverse side effects, Cycle 2 was delayed by 1 week and doxil was dose reduced to 70mg (previously given 80mg, ie 45mg/m2), Ifosfamide reduced by 10% (1500mg/m2 ? 1350mg/m2). Admitted for Cycle 2 Doxil/Ifos 12/1/21.   - Restaging imaging 11/22/21 with stable/no significant change.   - PICC ordered on admission; remove on discharge.  - OK to start chemotherapy in the setting of ALT/AST elevation per Dr. Wolff  - Concern raised for bilateral hand tremor on 12/4; on 12/6, no tremor noted. Patient did, however, report some intermittent myoclonic jerking of the upper extremities. No other s/sx of neurotoxicity; continue Q8H neuro checks per usual protocol.                                                        Treatment Plan: Doxil + ifosfamide (E6E8=30/1/21).                            - Doxil 70mg IV once - D1                             - Ifosfamide/Mesna 1350 mg/m2 (2510 mg) CIVI - D1-6                             - Mesna 1350 mg/m2 (2510 mg) IV over 18 hrs - starting D7 - will administer over 16 hours for earlier discharge                            - Neulasta injection - D8, scheduled 12/9 at the [facility] prior to his appt with ENT appt same day, in the future will continue to schedule at [facility]                            - Emend 150 mg once Day 1, will repeat on Day 6 if needed for nausea prior to discharge     # Cancer-related pain  -  Continue PTA Celebrex 200 mg BID   - Continue PTA topical Bengay PRN at bedtime to left back      # Normocytic anemia, mild  Noted intermittently. Likely related to chemo, hydration, frequent blood draws.  - Monitor  - Transfuse for Hgb <7, will sign blood consent if indicated     RENAL/FEN  # Mild HUSSEIN, resolved  Baseline Cr ~0.8, which increased to 1.13 on 12/3. Similar transient increase noted during C1, presumed due to hypovolemia in the setting of nausea with poor PO intake. S/p 1L NS on 12/3 with subsequent improvement in Cr.  - Follow daily CMP  - No mIVF; bolus PRN.    # Hypophosphatemia, intermittent  # Hypokalemia, intermittent  Secondary to ifosfamide. Required additional phos and K replacement after discharge from C1 Doxil/Ifos.  - Follow Phos and K daily while inpatient  - Start PO phosphorus 250 mg BID x12/6; increase dose as indicated  - Replete additional per standing protocol  - Follow BMP and phos with twice weekly labs outpatient    GI  # Transaminitis  Noted in late Nov: , , TBili WNL on 11/29/21. PTA statin was subsequently held. Per outpatient notes, he did not have any abdominal pain or other symptoms concerning for viral hepatitis. Discussed with Dr. Wolff, and it was discussed that this could be due to his statin and OK to proceed with chemotherapy as planned above. He also has known liver metastases which are likely contributing.   - Continue to hold PTA statin  - Follow LFTs daily      # History of chemotherapy-induced nausea  # Indigestion  Improved with the following regimen:  - Scheduled Zofran Q8H  - PRN compazine and Ativan  - Emend 150 mg IV x1 dose on 12/1; consider repeating x1 if nausea persists  - Added Zyprexa 5 mg at bedtime   - Trial Maalox PRN     # GERD  - Continue PTA omeprazole  - TUMS and Pepto Bismol available PRN      CV  # CAD   Followed by Dr. Gigi Vernon at [facility]. He was noted to have coronary calcium on chest CT. CTA 9/29/21 with  moderate mid LAD stenosis. Small to moderate caliber ramus branch with severe diffuse disease. Echo completed 9/29/21 with normal EF and no valvular dysfunction. Repeat ECHO on 10/27 with EF 60-65%, early diastolic dysfunction but otherwise no change from previous.  - PTA rosuvastatin 40 mg daily HELD for transaminitis, continue to hold on discharge  - Encourage outpatient cardiology follow-up as recommended     # Intermittent elevated blood pressures  BPs ranging from normotensive to hypertensive (with SBP 140s-160) during previous admission. He states that he runs higher at baseline (though this is based on clinic assessments as he does not typically monitor his BP at home). Appears recent intermittent clinic BPs sit at 140s-150s/80s-90s when seen in person, otherwise many of the visits are currently virtual. He is not on any antihypertensive medications PTA. Euvolemic on admission. Suspect he has undiagnosed essential HTN, though BP this admission appears stable 110s/70s.   - Would recommend outpatient PCP follow-up for further discussion of BP monitoring and management     ENDO  # Panhypopituitarism  # Macroadenoma of the pituitary gland  Diagnosed in 2010. Found to have a pituitary macroadenoma, 1.9 cm in largest dimension. Underwent hypophysectomy on 8/23/10. Subsequent presumptive pituitary deficiency, on empiric meds for adrenal insufficiency and thyroid. Previously followed by Dr. Bill Mccall; plans to establish care with Dr. Jamir Grant on 11/11/21.  - Continue levothyroxine  - Continue hydrocortisone 15 mg qAM and 10 mg qPM  - Continue endocrinology follow-up as scheduled     ENT  # Vocal cord dysfunction  Manifested as hoarseness. Seen by ENT (Dr. Kyree Bearden) on 6/21/21 and noted to have left true vocal fold motion impairment from mediastinal mass. Underwent injection of ProLaryn (filler/bulking agent) on 7/1/21, which reportedly has been minimally beneficial. Seen in follow-up by Dr. Bearden on  9/1/21 and noted to have minimal improvement; he was then referred to the [facility] ([facility]   - No acute inpatient management issues  - Continue outpatient ENT follow-up as previously scheduled (next scheduled for 12/9/21)    # History of chemotherapy-related mucositis  Noted after C1 Doxil/Ifos. Was using salt/soda rinses, MMW PRN, and nystatin if he were to develop thrush. On admission his mucositis was noted to have resolved.   - Biotene spray PRN  - Salt/soda rinses, MMW PRN    # Oral thrush  Pt has history of thrush after C1 Doxil/Ifos. His right buccal mucosa has a pinpoint white plaque on 12/7.   - Nystatin 1,000,000u swish and spit QID (12/7-continue)     DERM  # Rosacea  - Continue PTA metronidazole gel     PSYCH  # History of anxiety/depression  Situational, related to malaise/illness. Previously placed health psych referral.  - No acute inpatient management needs     FEN:  - Encouraged PO fluids, bolus PRN  - PRN lyte replacement  - RDAT     Prophy/Misc:  - VTE: ppx lovenox 40mg daily during admission   - GI/PUD: PTA omeprazole, Tums PRN  - Bowels: Senna and Miralax PRN  - Activity: Consider consulting PT if indicated  - COVID vaccination status: Completed 2 doses of Moderna (2nd dose given 4/15/21). Asymptomatic COVID screen negative on admission      Code: Full code  Disposition: Admitted to Oncology Team 3 service for scheduled chemotherapy. Anticipate discharge on 12/8 barring any other clinical complications  Follow up:   - Neulasta/labs on 12/9  - ENT appt 12/9  - Twice weekly labs including phosphorous level at [facility] 12/13-12/30. Of note, with future cycles he would prefer to have labs arranged at the [facility] if able to arrange.  - Scheduled for an [facility] visit 12/15    Staffed with Dr. Crow.     Chiquita Norris PA-C  Hematology/Oncology  Pager #[phone]   ___________________________________________________________________    Subjective & Interval History:    No acute events  "noted overnight. He felt nauseous this morning, persistent despite PRN compazine. His abdomen also feels full and he feels he has some indigestion, will trial maalox. He denies headaches, vision changes, numbness/tingling, weakness, or gait disturbance. No fevers or chills. He feels he is developing thrush on his right cheek. Doing s/s swishes and using Biotene. No neurotoxicity or tremors. Looking forward to going home tomorrow.    Physical Exam:    Blood pressure (!) 151/93, pulse 88, temperature (!) 96.3  F (35.7  C), temperature source Oral, resp. rate 20, height 1.702 m (5' 7\"), weight 65.6 kg (144 lb 11.2 oz), SpO2 100 %.     General: alert and cooperative, sitting in chair at bedside, no acute distress  HEENT: sclera anicteric, EOMI, MMM. Pinpoint right buccal mucosal plaque  CV: RRR, normal S1/S2, no m/r/g  Resp: CTAB, no wheezing/crackles, normal respiratory effort on ambient air  GI: soft, non-tender, non-distended, bowel sounds present and normoactive  MSK: warm and well-perfused, no edema  Skin: no rashes on limited exam, no jaundice  Neuro: AOx3, chronic right hemifacial spasm noted, normal speech without dysarthria, moves all extremities equally, normal gait and stance, no resting upper extremity tremor, no appreciable myoclonus, normal finger-nose-finger  Vascular access: RUE PICC C/D/I, no surrounding erythema or ecchymosis    Labs & Studies: I personally reviewed the following studies:  ROUTINE LABS (Last four results):  CMP  Recent Labs   Lab 12/07/21  0556 12/06/21  0656 12/05/21  1418 12/05/21  0656 12/04/21  1615 12/04/21  0738    142  --  142  --  144   POTASSIUM 3.4 3.6 3.9 3.4   < > 3.2*   CHLORIDE 112* 111*  --  110*  --  110*   CO2 24 26  --  28  --  28   ANIONGAP 5 5  --  4  --  6   GLC 93 88  --  93  --  82   BUN 20 17  --  18  --  14   CR 0.77 0.80  --  0.78  --  0.76   GFRESTIMATED 90 89  --  90  --  >90   COTY 9.2 9.0  --  9.2  --  9.2   MAG 2.5* 2.4*  --  2.3  --  2.4*   PHOS 2.6 " 2.4*  --  2.0*  --  2.7   PROTTOTAL 6.2* 5.8*  --  5.7*  --  6.0*   ALBUMIN 2.7* 2.6*  --  2.4*  --  2.7*   BILITOTAL 0.4 0.3  --  0.5  --  0.3   ALKPHOS 379* 399*  --  378*  --  374*   AST 56* 78*  --  73*  --  119*   * 152*  --  152*  --  188*    < > = values in this interval not displayed.     CBC  Recent Labs   Lab 12/07/21  0556 12/06/21  0656 12/05/21  0656 12/04/21  0738   WBC 6.7 6.9 8.6 10.4   RBC 3.88* 3.77* 3.73* 4.00*   HGB 10.7* 10.3* 10.5* 11.0*   HCT 34.2* 33.2* 32.9* 35.1*   MCV 88 88 88 88   MCH 27.6 27.3 28.2 27.5   MCHC 31.3* 31.0* 31.9 31.3*   RDW 16.5* 16.5* 16.4* 15.9*    187 206 204     INR  Recent Labs   Lab 12/01/21  1730   INR 1.14       Microbiology  No results for input(s): LACT in the last 168 hours.    Medications list for reference:  Current Facility-Administered Medications   Medication     0.9% sodium chloride BOLUS     acetaminophen (TYLENOL) tablet 500-1,000 mg     albuterol (PROVENTIL HFA/VENTOLIN HFA) inhaler     albuterol (PROVENTIL) neb solution 2.5 mg     artificial saliva (BIOTENE MT) solution 1-2 spray     calcium carbonate (TUMS) chewable tablet 500 mg     celecoxib (celeBREX) capsule 200 mg     Chemotherapy Infusing-Continuous Infusion     diphenhydrAMINE (BENADRYL) injection 50 mg     enoxaparin ANTICOAGULANT (LOVENOX) injection 40 mg     EPINEPHrine PF (ADRENALIN) injection 0.3 mg     famotidine (PEPCID) injection 20 mg     guaiFENesin-codeine (ROBITUSSIN AC) 100-10 MG/5ML solution 10 mL     heparin lock flush 10 UNIT/ML injection 5-20 mL     heparin lock flush 10 UNIT/ML injection 5-20 mL     hydrocortisone (CORTEF) tablet 10 mg     hydrocortisone (CORTEF) tablet 20 mg     Ifosfamide (IFEX) 2,510 mg, mesna (MESNEX) 2,510 mg in sodium chloride 0.9 % 1,125 mL infusion     levothyroxine (SYNTHROID/LEVOTHROID) tablet 75 mcg     LORazepam (ATIVAN) tablet 0.5 mg     menthol (Topical Analgesic) 2.5% (BENGAY VANISHING SCENT) 2.5 % topical gel     meperidine  (DEMEROL) injection 25 mg     mesna (MESNEX) 2,510 mg in sodium chloride 0.9 % 1,075 mL infusion     methylPREDNISolone sodium succinate (solu-MEDROL) injection 125 mg     metroNIDAZOLE (METROGEL) 0.75 % topical gel     naloxone (NARCAN) injection 0.2 mg     OLANZapine (zyPREXA) tablet 5 mg     ondansetron (ZOFRAN) injection 8 mg    Or     ondansetron (ZOFRAN-ODT) ODT tab 8 mg    Or     ondansetron (ZOFRAN) tablet 8 mg     ondansetron (ZOFRAN) tablet 8 mg     pantoprazole (PROTONIX) EC tablet 40 mg     phosphorus tablet 250 mg (PHOSPHA 250 NEUTRAL) per tablet 250 mg     polyethylene glycol (MIRALAX) Packet 17 g     potassium chloride 20 mEq in 50 mL intermittent infusion     prochlorperazine (COMPAZINE) tablet 5-10 mg    Or     prochlorperazine (COMPAZINE) injection 5-10 mg     prochlorperazine (COMPAZINE) tablet 5 mg     senna-docusate (SENOKOT-S/PERICOLACE) 8.6-50 MG per tablet 1-2 tablet    Or     senna-docusate (SENOKOT-S/PERICOLACE) 8.6-50 MG per tablet 2 tablet     sodium chloride (OCEAN) 0.65 % nasal spray 1 spray     sodium chloride (PF) 0.9% PF flush 10-20 mL     sodium chloride (PF) 0.9% PF flush 10-40 mL     triamcinolone (KENALOG) 0.1 % cream     Vitamin D3 (CHOLECALCIFEROL) tablet 1,000 Units         ------------------------------------------------------------------------------------  PHYSICIAN ATTESTATION     I, Brice Crow, saw and evaluated Ifrah Huitron as part of a shared visit.  I have reviewed and discussed with the advanced practice provider their history, physical and plan.     I personally reviewed the vital signs, medications, labs and imaging.     My key history or physical exam findings:  Patient tolerating chemotherapy well thus far.  No physical examination abnormalities.  Mild oral mucositis today, and perhaps some early thrush.  Mild nausea, improved with antiemetics.  No CNS or PNS toxicities.  Labs show mild (stable) transaminitis and alkaline phosphatase elevation.  Serum  phosphorus is low; will replete.     Key management decisions made by me:  Continue with planned inpatient chemotherapy; doxil + ifosfamide (with 10% dose reduction of both agents).  Day #6 today.  No changes to dose or schedule.  Anticipated discharge on Wednesday afternoon/evening.    I spent a total of 40 minutes on this patient on the day of the encounter, of which more than 50% of this time was used for counselling and coordination of care.     Brice Crow M.D.  Date of Service (when I saw the patient): 12/07/21

## 2021-12-07 NOTE — PLAN OF CARE
[id number]  AVSS, alert and oriented x 4, denies pain/sob. Intermittent nausea, refuse intervention. On continuous ifosamide/mesna at day 6, tomorrow needs day 7 Mesna and discharging home in the [place] after mesna done. PICC is intact with blood return and infusing chemo.Up independent, good urine output, LBM today.  Continue to monitor care.

## 2021-12-07 NOTE — PROGRESS NOTES
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 Kyree Bearden for evaluation of left vocal fold paralysis. Mr. Huitron is a 71 year old male who presents to us today with dysphonia.      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    He presents today for evaluation. he reports:    Dysphonia  - Dr. Bearden thought there was a problem with his vocal fold  - was told there was an injury during the CT scan in 2021  - woke up one morning with laryngitis  - occurred this past summer  - his voice is squeaky  - high-pitched  - saw an SLP  - everyone he talks to says it has improved  - breath was escaping  - caused him to he dizzy and lightheaded  - breathing therapy helped him  - doesn't have this anymore  - in the last few months   - can talk much longer now without difficulty  - voice feels gravely now  - voice is not strong   - sound of his voice bothers him  - people who know him think his voice is clear  - seems to be getting better over time  - he thinks vocal fold injection didn't improve his voice  - one month ago people thought that his voice was improving  - describes a sore throat always   - has a dry mouth at his palate  - 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  - used to have more trouble talking to clients due to breathing  - had his kids take over the business due to difficulty speaking due to the  air  - would like his voice to be better    Dysphagia  - feels that food and drinks tcan go down the wrong tube  - occurs about 2-3x per week  - has always had this before treatments  - he drinks water while there is food in his mouth to help this go down  - swallows twice to help protect food from the wrong tube    Dyspnea  - no PNAs  - has improved a lot   - not significantly worse with exertion    Throat clearing/cough  - has an occasional coughing episode  - uses cough suppression medication once per week  - this improves his symptoms          PAST MEDICAL HISTORY:   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     Thyroid disease     removed pituitary gland     PAST SURGICAL HISTORY:   Past Surgical History:   Procedure Laterality Date     COLONOSCOPY N/A 12/17/2020    Procedure: COLONOSCOPY;  Surgeon: Ken Camacho MD;  Location: [facility]     ENT SURGERY       HERNIA REPAIR       LARYNGOSCOPY, EXCISE VOCAL CORD LESION MICROSCOPIC, COMBINED Left 07/01/2021    Procedure: MICROLARYNGOSCOPY, LEFT TRUE VOCAL CORD INJECTION WITH PROLARYN;  Surgeon: Kyree Bearden MD;  Location: [facility]     PHACOEMULSIFICATION WITH STANDARD INTRAOCULAR LENS IMPLANT Right 03/10/2021    Procedure: Cataract removal with implant.;  Surgeon: Jamir Mac MD;  Location: [facility]     PHACOEMULSIFICATION WITH STANDARD INTRAOCULAR LENS IMPLANT Left 04/05/2021    Procedure: Cataract removal with implant.;  Surgeon: Jamir Mac MD;  Location: [facility]     PICC DOUBLE LUMEN PLACEMENT Right 12/01/2021    5FR DL PICC, basilic vein. L-38cm, 1cm out.     PITUITARY EXCISION       tooth pulled 4/7  Right      CURRENT MEDICATIONS:   Current Outpatient Medications:      acetaminophen (TYLENOL) 500 MG tablet, Take 500-1,000 mg by mouth every 6 hours as needed for mild pain, Disp: , Rfl:      calcium carbonate (TUMS) 500 MG chewable tablet, Take 1  "tablet (500 mg) by mouth 3 times daily as needed for heartburn, Disp: , Rfl:      celecoxib (CELEBREX) 200 MG capsule, Take 1 capsule (200 mg) by mouth 2 times daily. Note this is a new a strength! Only one capsule at a time!, Disp: 60 capsule, Rfl: 3     cholecalciferol (VITAMIN D-1000 MAX ST) 1000 units TABS, Take 1,000 Units by mouth daily , Disp: , Rfl:      fluconazole (DIFLUCAN) 200 MG tablet, Take 1 tablet (200 mg) by mouth daily, Disp: 7 tablet, Rfl: 0     guaiFENesin-codeine (GUAIFENESIN AC) 100-10 MG/5ML syrup, Take 10 mLs by mouth every 4 hours as needed for cough, Disp: 118 mL, Rfl: 0     hydrocortisone (CORTEF) 10 MG tablet, Take 20 mg in the morning and 10 mg in the afternoon, Disp: , Rfl:      Insulin Syringe-Needle U-100 27.5G X 5/8\" 2 ML MISC, 1 each daily as needed (with solucortef for adrenal crisis), Disp: 10 each, Rfl: 1     levothyroxine (SYNTHROID/LEVOTHROID) 75 MCG tablet, Take 75 mcg by mouth every morning , Disp: , Rfl:      LORazepam (ATIVAN) 0.5 MG tablet, Take 1 tablet (0.5 mg) by mouth every 4 hours as needed for nausea or vomiting . Caution: causes sedation., Disp: 30 tablet, Rfl: 0     Menthol-Methyl Salicylate (DALIA MALIK GREASELESS) cream, Apply topically every 6 hours as needed, Disp: , Rfl:      metroNIDAZOLE (METROGEL) 0.75 % external gel, Apply topically 2 times daily, Disp: 45 g, Rfl: 11     nystatin (MYCOSTATIN) 263016 UNIT/ML suspension, Take 5 mLs (500,000 Units) by mouth 4 times daily . Start at first signs of white coating on tongue., Disp: 473 mL, Rfl: 0     OLANZapine (ZYPREXA) 5 MG tablet, Take 1 tablet (5 mg) by mouth At Bedtime Continue until your nausea resolves, Disp: 30 tablet, Rfl: 1     omeprazole (PRILOSEC) 20 MG DR capsule, Take 1 capsule (20 mg) by mouth 2 times daily, Disp: 60 capsule, Rfl: 1     ondansetron (ZOFRAN) 4 MG tablet, Take 1-2 tablets (4-8 mg) by mouth every 8 hours as needed for nausea, Disp: 40 tablet, Rfl: 1     phosphorus tablet 250 mg (PHOSPHA " 250 NEUTRAL) 250 MG per tablet, Take phosphorous 3 times daily starting 12/9. We will follow your phosphorous levels twice weekly, and your outpatient providers will instruct you to adjust the dosing based on your phosphorous levels, Disp: 42 tablet, Rfl: 1     potassium chloride ER (K-TAB) 20 MEQ CR tablet, Take 1 tablet (20 mEq) by mouth daily as needed (If your outpatient providers notify you that your potassium is low) You will receive a call when to take potassium, Disp: 30 tablet, Rfl: 0     prochlorperazine (COMPAZINE) 5 MG tablet, Take 1 tablet (5 mg) by mouth every 6 hours as needed for nausea or vomiting . Caution: causes sedation., Disp: 30 tablet, Rfl: 1     triamcinolone (KENALOG) 0.1 % external cream, Apply topically 2 times daily , Disp: , Rfl:   No current facility-administered medications for this visit.    ALLERGIES: Penicillins    SOCIAL HISTORY:    Social History     Socioeconomic History     Marital status:      Spouse name: Not on file     Number of children: Not on file     Years of education: Not on file     Highest education level: Not on file   Occupational History     Not on file   Tobacco Use     Smoking status: Never Smoker     Smokeless tobacco: Never Used   Substance and Sexual Activity     Alcohol use: Yes     Comment: rare     Drug use: Never     Sexual activity: Not on file   Other Topics Concern     Parent/sibling w/ CABG, MI or angioplasty before 65F 55M? Not Asked   Social History Narrative     Not on file     Social Determinants of Health     Financial Resource Strain: Low Risk      Difficulty of Paying Living Expenses: Not very hard   Food Insecurity: No Food Insecurity     Worried About Running Out of Food in the Last Year: Never true     Ran Out of Food in the Last Year: Never true   Transportation Needs: No Transportation Needs     Lack of Transportation (Medical): No     Lack of Transportation (Non-Medical): No   Physical Activity: Inactive     Days of Exercise per  Week: 0 days     Minutes of Exercise per Session: 0 min   Stress: Not on file   Social Connections: Moderately Integrated     Frequency of Communication with Friends and Family: Twice a week     Frequency of Social Gatherings with Friends and Family: Twice a week     Attends Restoration Services: Never     Active Member of Clubs or Organizations: Yes     Attends Club or Organization Meetings: 1 to 4 times per year     Marital Status:    Intimate Partner Violence: Unknown     Fear of Current or Ex-Partner: No     Emotionally Abused: No     Physically Abused: Not on file     Sexually Abused: No   Housing Stability: Not on file       FAMILY HISTORY:   Family History   Problem Relation Age of Onset     Lupus Mother      ALS Father      Rheumatoid Arthritis Sister      Non-contributory for problems with anesthesia    REVIEW OF SYSTEMS:   The patient was asked a 14 point review of systems regarding constitutional symptoms, eye symptoms, ears, nose, mouth, throat symptoms, cardiovascular symptoms, respiratory symptoms, gastrointestinal symptoms, genitourinary symptoms, musculoskeletal symptoms, integumentary symptoms, neurological symptoms, psychiatric symptoms, endocrine symptoms, hematologic/lymphatic symptoms, and allergic/ immunologic symptoms.   The pertinent factors have been included in the HPI and below.  Patient Supplied Answers to Review of Systems  No flowsheet data found.    Physical Examination   The patient underwent a physical examination as described below. The pertinent positive and negative findings are summarized after the description of the examination.  Constitutional: The patient's developmental and nutritional status was assessed. The patient's voice quality was assessed.  Head and Face: The head and face were inspected for deformities. The sinuses were palpated. The salivary glands were palpated. Facial muscle strength was assessed bilaterally.  Eyes: Extraocular movements and primary gaze  alignment were assessed.  Ears, Nose, Mouth and Throat: The ears and nose were examined for deformities. The nasal septum, mucosa, and turbinates were inspected by anterior rhinoscopy. The lips, teeth, and gums were examined for abnormalities. The oral mucosa, tongue, palate, tonsils, lateral and posterior pharynx were inspected for the presence of asymmetry or mucosal lesions.    Neck: The tracheal position was noted, and the neck mass palpated to determine if there were any asymmetries, abnormal neck masses, thyromegally, or thyroid nodules.  Respiratory: The nature of the breathing and chest expansion/symmetry was observed.  Cardiovascular: The patient was examined to determine the presence of any edema or jugular venous distension.  Abdomen: The contour of the abdomen was noted.  Lymphatic: The patient was examined for infraclavicular lymphadenopathy.  Musculoskeletal: The patient was inspected for the presence of skeletal deformities.  Extremities: The extremities were examined for any clubbing or cyanosis.  Skin: The skin was examined for inflammatory or neoplastic conditions.  Neurologic: The patient's orientation, mood, and affect were noted. The cranial nerve  functions were examined.  Other pertinent positive and negative findings on physical examination:      OC/OP: no lesions, uvula midline, soft palate elevates symmetrically  Neck: no lesions, no TH tenderness to palpation    All other physical examination findings were within normal limits and noncontributory.  Procedures   Video Laryngoscopy with Stroboscopy (CPT 25245) and Behavioral & Qualitative Evaluation of Voice and Resonence     Preoperative Diagnosis:  Dysphonia and throat symptoms  Postoperative Diagnosis: Dysphonia and throat symptoms  Indication:  Patient has new or persistent dysphonia and throat symptoms.  This requires evaluation by stroboscopy to fully delineate the laryngeal functioning; especially mucosal wave assessment, ultrasharp  visualization of lesions on the vocal folds, and overall functioning of the larynx.  Details of Procedure: A 70 degree rigid telescopic laryngoscope or a distal chip flexible scope was used. The lighting was obtained via a light cable connected to a stroboscopic unit. The telescope was inserted either transorally or transnasally until the vocal folds could be visualized. The patient was instructed to sustain the vowel  ee  at a comfortable pitch and loudness as the voice was monitored by a microphone connected to a fundamental frequency tracker. This circuit tracked vocal periodicity, allowing the light to flash in synchrony with the glottal cycles. A setting on the stroboscope was set to change the phase of light strobing with relation to the vocal fundamental frequency, producing an image of 1 to 2 glottal cycles every second. The video images were recorded for analysis. Use of the variable speed, slow and stop scan allowed careful study of pertinent segments of laryngeal function to increase accuracy of clinical assessments of function and dysfunction.  In particular, features of glottal closure, mucosal wave on the vocal fold cover and laryngeal symmetry were analyzed. Lastly, the patient was asked to phonate speech samples and auditory/perceptual evaluation of voice and resonance were performed.  The vocal quality was carefully evaluated for hoarseness, breathiness, loudness, phrase length and intelligibility to determine the source of dysphonia.    Findings:   A. LARYNGOVIDEOSTROBOSCOPY   Anatomic/Physiological Deviations:  Left vocal fold paralysis, bilateral anterior saccular cysts  Mucosal wave: Right:  No restriction     Left: No restriction  Bilateral Vocal Fold Vibration: Asymmetric  Vocal Process: Right: No restriction    Left:  Marked restriction  Vocal Fold closure: Complete glottal closure    Complication(s): None  Disposition: Patient tolerated the procedure well                    Fiberoptic  Endoscopic Evaluation of Swallowing (CPT 36858)  and Interpretation of Swallowing (CPT 62007)    Indications: See above notes for complete history and physical. Patient complains of dysphagia to both solids and liquids and/or there is suggestion on history and endoscopic exam of the presence of dysphagia causing medical complaints.  Swallowing evaluation is being performed to assess the presence and degree of dysphagia, and to recommend a safe diet.     Pulmonary Status:  No PNA   Current Diet:              regular                                        thin liquids      Consistency Amounts:  Thin Liquid: sip   Puree: 1 tsp  Solid: cookies            Positioning: upright in a chair  Oral Peripheral Exam: See physical exam section.  Anatomic Notes: See Videostroboscopy report for assessment of anatomy and laryngeal functioning  Pharyngeal secretions prior to administration of liquid or food: No  Oral Phase Abnormal Findings: No abnormal behavior observed  Behavioral Adaptations: No abnormal behavior observed and Repeat dry swallows  Pharyngeal Phase Abnormal Findings: penetration with large sips of thins, left sided vallecular residue improves with liquid wash    Recommended Diet:  regular                                        thin liquids              Review of Relevant Clinical Data   I personally reviewed:  Notes:   Op note Kyree Bearden 7/1/21  Dedo exposure, 1mL of voice gel injected    Dr. Kyree Bearden, 6/21/21     Radiology:     CT CHEST/ABDOMEN/PELVIS W CONTRAST 11/22/2021   mediastinal mass          Pathology: CT guided biopsy of anterior mediastinal mass 5/20/21  SPECIMEN(S):   Left pleural mass buopsy, CT guided     FINAL DIAGNOSIS:   Left pleural mass biopsy, CT guided:   - Malignant spindle cell neoplasm with necrosis (see comment)     COMMENT:   Special stains were performed with appropriate controls.  The tumor cells   are diffusely positive for CK   AE1/AE3 and CK MIRZA.  There is also focal to patchy  staining for D2-40,   CD68, and WT-1.  INI1 is retained in   the tumor cells.  The Ki-67 labeling index is approximately 70%.  The   tumor cells show high PD-L1 expression   (TPS 90%).  No expression of P63, calretinin, DOG1, ALK1, , CK 5/6,   STAT6, SMA, SALL4, desmin, S100,   Myogenin, CD21, and CD23 is identified in the tumor cells.  These findings    together with tumor morphology and   location are most compatible with malignant sarcomatoid mesothelioma.  The    differential diagnosis includes   sarcomatoid carcinoma and sarcomas such as synovial sarcoma and   histiocytic sarcoma.  Additional tests are   pending and may help to classify this tumor.     No fungal or mycobacterial organisms are identified with GMS and Jessenia   stains.      Labs:  Lab Results   Component Value Date    TSH 0.74 10/04/2021     Lab Results   Component Value Date     2021    CO2 23 2021    BUN 21 2021    CREAT 0.7 2021    PHOS 2.4 (L) 2021     Lab Results   Component Value Date    WBC 6.2 2021    HGB 10.6 (L) 2021    HCT 33.7 (L) 2021    MCV 87 2021     2021     Lab Results   Component Value Date    INR 1.14 2021     No results found for: OLAF  No components found for: RHEUMATOIDFACTOR,  RF  Lab Results   Component Value Date    CRP 26.9 (H) 2021     No components found for: CKTOT, URICACID  No components found for: C3, C4, DSDNAAB, NDNAABIFA  No results found for: MPOAB    Patient reported Quality of Life (QOL) Measures   Patient Supplied Answers To VHI Questionnaire  No flowsheet data found.    Patient Supplied Answers To EAT Questionnaire  No flowsheet data found.    Patient Supplied Answers To CSI Questionnaire  No flowsheet data found.    Patient Supplied Answers to Dyspnea Index Questionnaire:  No flowsheet data found.    Impression & Plan     IMPRESSION: Mr. Huitron is a 71 year old male who is being seen for the followin. Dysphonia  -  in the setting spindle cell carcinoma, with mediastinal extent undergoing chemotherapy  - woke up one morning with laryngitis  - he reports this started around March   - but he also had a CT-guided lung biopsy on 5/20/2021 which he reports thinks affected his voice and is around the time when his voice changed  - voice demand is low now   - used to have more trouble talking to clients due to breathing, but this has improved in the past month  - had therapy which improved his breathing  - s/p MDL with voice gel on 7/1/21 by Dr. Bearden - though he reports no benefit in his voice after that  - however in the past months the voice has improved  - he can talk now with people without needing to catch his breathe   - however he is still bothered by the gravel sound in his voice  - MPT is 19s  - strobe 12/9/21 shows left vocal fold paralysis, bilateral anterior saccular cysts  - symptoms due to vocal fold paralysis and secondary muscle tension  - he does have good closure at this point, therefore would recommend optimization with voice therapy at this point  - will follow up in March when he is at 1 year from initial symptoms to determine if he is still symptomatic and would like further treatment at that point   Plan  - voice therapy       2. Dysphagia  - feels that food and drinks can go down the wrong tube  - occurs about 2-3x per week  - has always had difficulties with this before radiation and chemotherapy treatments  - FEES 12/9/21 shows penetration with large sips of thins, left sided vallecular residue improves with liquid wash  - symptoms due to pharyngeal weakness  - discussed his precautions - small bites and liquid wash do help therefore will keep focusing on thsi  - would encourage peanut butter, eggs and ensure/boost for protein rather than meats and seeds  Plan  - swallow precautions  - if worsening in swallow - will need repeat check     RETURN VISIT: 3 months      Scribe Disclosure:  I, Katey Ram, am  serving as a scribe to document services personally performed by Sherry Esquivel MD at this visit, based upon the provider's statements to me. All documentation has been reviewed by the aforementioned provider prior to being entered into the official medical record.     Thank you for the kind referral and for allowing me to share in the care of Mr. Huitron. If you have any questions, please do not hesitate to contact me.    Sherry Esquivel MD    Laryngology    [facility]  Department of  Otolaryngology - Head and Neck Surgery  [facility] [place] [address]  Appointment line: [phone]  Fax: [phone]  [email or web address]

## 2021-12-07 NOTE — PLAN OF CARE
[id number]    CIVI Ifos/Mesna infusing via PICC, brisk blood returns.     Hypertensive, not within parameters to notify. Noted new right sided red/white lesion in mouth, minimal pain, salt and soda rinse continues. Patient is hoping to get Nystatin ordered today. Denies pain and SOB. Mild nausea, but only requested scheduled Zofran. Intermittently sleeping. Continue with POC.

## 2021-12-08 ENCOUNTER — TELEPHONE (OUTPATIENT)
Dept: OTOLARYNGOLOGY | Facility: CLINIC | Age: 71
End: 2021-12-08
Payer: MEDICARE

## 2021-12-08 VITALS
BODY MASS INDEX: 22.42 KG/M2 | RESPIRATION RATE: 22 BRPM | SYSTOLIC BLOOD PRESSURE: 143 MMHG | WEIGHT: 142.86 LBS | OXYGEN SATURATION: 99 % | HEIGHT: 67 IN | DIASTOLIC BLOOD PRESSURE: 87 MMHG | TEMPERATURE: 95.5 F | HEART RATE: 81 BPM

## 2021-12-08 LAB
ALBUMIN SERPL-MCNC: 2.4 G/DL (ref 3.4–5)
ALP SERPL-CCNC: 320 U/L (ref 40–150)
ALT SERPL W P-5'-P-CCNC: 92 U/L (ref 0–70)
ANION GAP SERPL CALCULATED.3IONS-SCNC: 5 MMOL/L (ref 3–14)
AST SERPL W P-5'-P-CCNC: 42 U/L (ref 0–45)
BASOPHILS # BLD MANUAL: 0.1 10E3/UL (ref 0–0.2)
BASOPHILS NFR BLD MANUAL: 1 %
BILIRUB SERPL-MCNC: 0.3 MG/DL (ref 0.2–1.3)
BUN SERPL-MCNC: 18 MG/DL (ref 7–30)
CALCIUM SERPL-MCNC: 9 MG/DL (ref 8.5–10.1)
CHLORIDE BLD-SCNC: 114 MMOL/L (ref 94–109)
CO2 SERPL-SCNC: 23 MMOL/L (ref 20–32)
CREAT SERPL-MCNC: 0.92 MG/DL (ref 0.66–1.25)
EOSINOPHIL # BLD MANUAL: 0.2 10E3/UL (ref 0–0.7)
EOSINOPHIL NFR BLD MANUAL: 4 %
ERYTHROCYTE [DISTWIDTH] IN BLOOD BY AUTOMATED COUNT: 16.6 % (ref 10–15)
GFR SERPL CREATININE-BSD FRML MDRD: 82 ML/MIN/1.73M2
GLUCOSE BLD-MCNC: 94 MG/DL (ref 70–99)
HCT VFR BLD AUTO: 30.6 % (ref 40–53)
HGB BLD-MCNC: 9.7 G/DL (ref 13.3–17.7)
LYMPHOCYTES # BLD MANUAL: 0.6 10E3/UL (ref 0.8–5.3)
LYMPHOCYTES NFR BLD MANUAL: 11 %
MAGNESIUM SERPL-MCNC: 2.4 MG/DL (ref 1.6–2.3)
MCH RBC QN AUTO: 28 PG (ref 26.5–33)
MCHC RBC AUTO-ENTMCNC: 31.7 G/DL (ref 31.5–36.5)
MCV RBC AUTO: 88 FL (ref 78–100)
MONOCYTES # BLD MANUAL: 0.1 10E3/UL (ref 0–1.3)
MONOCYTES NFR BLD MANUAL: 2 %
NEUTROPHILS # BLD MANUAL: 4.2 10E3/UL (ref 1.6–8.3)
NEUTROPHILS NFR BLD MANUAL: 82 %
PHOSPHATE SERPL-MCNC: 2.1 MG/DL (ref 2.5–4.5)
PLAT MORPH BLD: ABNORMAL
PLATELET # BLD AUTO: 163 10E3/UL (ref 150–450)
POTASSIUM BLD-SCNC: 3.6 MMOL/L (ref 3.4–5.3)
PROT SERPL-MCNC: 5.7 G/DL (ref 6.8–8.8)
RBC # BLD AUTO: 3.46 10E6/UL (ref 4.4–5.9)
RBC MORPH BLD: ABNORMAL
SODIUM SERPL-SCNC: 142 MMOL/L (ref 133–144)
VARIANT LYMPHS BLD QL SMEAR: PRESENT
WBC # BLD AUTO: 5.1 10E3/UL (ref 4–11)

## 2021-12-08 PROCEDURE — 999N000044 HC STATISTIC CVC DRESSING CHANGE

## 2021-12-08 PROCEDURE — 250N000011 HC RX IP 250 OP 636: Performed by: INTERNAL MEDICINE

## 2021-12-08 PROCEDURE — 250N000013 HC RX MED GY IP 250 OP 250 PS 637: Performed by: PHYSICIAN ASSISTANT

## 2021-12-08 PROCEDURE — 36592 COLLECT BLOOD FROM PICC: CPT | Performed by: PHYSICIAN ASSISTANT

## 2021-12-08 PROCEDURE — 258N000003 HC RX IP 258 OP 636: Performed by: PHYSICIAN ASSISTANT

## 2021-12-08 PROCEDURE — 83735 ASSAY OF MAGNESIUM: CPT | Performed by: INTERNAL MEDICINE

## 2021-12-08 PROCEDURE — 250N000011 HC RX IP 250 OP 636: Performed by: PHYSICIAN ASSISTANT

## 2021-12-08 PROCEDURE — 258N000003 HC RX IP 258 OP 636: Performed by: INTERNAL MEDICINE

## 2021-12-08 PROCEDURE — 85027 COMPLETE CBC AUTOMATED: CPT | Performed by: PHYSICIAN ASSISTANT

## 2021-12-08 PROCEDURE — 84100 ASSAY OF PHOSPHORUS: CPT | Performed by: PHYSICIAN ASSISTANT

## 2021-12-08 PROCEDURE — 99239 HOSP IP/OBS DSCHRG MGMT >30: CPT | Performed by: INTERNAL MEDICINE

## 2021-12-08 PROCEDURE — 80053 COMPREHEN METABOLIC PANEL: CPT | Performed by: PHYSICIAN ASSISTANT

## 2021-12-08 PROCEDURE — 250N000009 HC RX 250: Performed by: INTERNAL MEDICINE

## 2021-12-08 RX ORDER — CALCIUM CARBONATE 500 MG/1
1 TABLET, CHEWABLE ORAL 3 TIMES DAILY PRN
COMMUNITY
Start: 2021-12-08 | End: 2022-03-09

## 2021-12-08 RX ORDER — POTASSIUM CHLORIDE 1500 MG/1
20 TABLET, EXTENDED RELEASE ORAL DAILY PRN
Qty: 30 TABLET | Refills: 0 | Status: SHIPPED | OUTPATIENT
Start: 2021-12-08 | End: 2021-12-16

## 2021-12-08 RX ORDER — OLANZAPINE 5 MG/1
5 TABLET ORAL AT BEDTIME
Qty: 30 TABLET | Refills: 1 | Status: SHIPPED | OUTPATIENT
Start: 2021-12-08 | End: 2022-01-12

## 2021-12-08 RX ADMIN — POTASSIUM PHOSPHATE, MONOBASIC POTASSIUM PHOSPHATE, DIBASIC 9 MMOL: 224; 236 INJECTION, SOLUTION, CONCENTRATE INTRAVENOUS at 10:47

## 2021-12-08 RX ADMIN — NYSTATIN 1000000 UNITS: 100000 SUSPENSION ORAL at 08:21

## 2021-12-08 RX ADMIN — HYDROCORTISONE 10 MG: 10 TABLET ORAL at 13:30

## 2021-12-08 RX ADMIN — NYSTATIN 1000000 UNITS: 100000 SUSPENSION ORAL at 12:02

## 2021-12-08 RX ADMIN — CALCIUM CARBONATE 500 MG: 500 TABLET, CHEWABLE ORAL at 15:53

## 2021-12-08 RX ADMIN — PANTOPRAZOLE SODIUM 40 MG: 40 TABLET, DELAYED RELEASE ORAL at 08:21

## 2021-12-08 RX ADMIN — MESNA 2510 MG: 100 INJECTION, SOLUTION INTRAVENOUS at 00:26

## 2021-12-08 RX ADMIN — HYDROCORTISONE 20 MG: 10 TABLET ORAL at 08:21

## 2021-12-08 RX ADMIN — FOSAPREPITANT 150 MG: 150 INJECTION, POWDER, LYOPHILIZED, FOR SOLUTION INTRAVENOUS at 15:14

## 2021-12-08 RX ADMIN — DIBASIC SODIUM PHOSPHATE, MONOBASIC POTASSIUM PHOSPHATE AND MONOBASIC SODIUM PHOSPHATE 250 MG: 852; 155; 130 TABLET ORAL at 08:21

## 2021-12-08 RX ADMIN — Medication 1000 UNITS: at 08:21

## 2021-12-08 RX ADMIN — METRONIDAZOLE: 7.5 GEL TOPICAL at 08:20

## 2021-12-08 RX ADMIN — LEVOTHYROXINE SODIUM 75 MCG: 0.05 TABLET ORAL at 05:08

## 2021-12-08 RX ADMIN — ONDANSETRON HYDROCHLORIDE 8 MG: 8 TABLET, FILM COATED ORAL at 15:14

## 2021-12-08 RX ADMIN — SODIUM CHLORIDE, POTASSIUM CHLORIDE, SODIUM LACTATE AND CALCIUM CHLORIDE 1000 ML: 600; 310; 30; 20 INJECTION, SOLUTION INTRAVENOUS at 08:20

## 2021-12-08 RX ADMIN — NYSTATIN 1000000 UNITS: 100000 SUSPENSION ORAL at 15:53

## 2021-12-08 RX ADMIN — CELECOXIB 200 MG: 200 CAPSULE ORAL at 08:21

## 2021-12-08 RX ADMIN — ALUMINUM HYDROXIDE, MAGNESIUM HYDROXIDE, AND SIMETHICONE 30 ML: 200; 200; 20 SUSPENSION ORAL at 15:53

## 2021-12-08 RX ADMIN — ONDANSETRON HYDROCHLORIDE 8 MG: 8 TABLET, FILM COATED ORAL at 05:08

## 2021-12-08 RX ADMIN — ONDANSETRON HYDROCHLORIDE 8 MG: 8 TABLET, FILM COATED ORAL at 12:02

## 2021-12-08 ASSESSMENT — ACTIVITIES OF DAILY LIVING (ADL)
ADLS_ACUITY_SCORE: 6

## 2021-12-08 ASSESSMENT — MIFFLIN-ST. JEOR: SCORE: 1351.63

## 2021-12-08 NOTE — PLAN OF CARE
AVSS. Denied pain. C/O intermittent nausea, relieved with scheduled and prn antiemetics. Up ad kaitlynn in room and halls. Fair po intake. Adequate urine output. LBM 12/7. Received a 1 liter bolus of LR for elevated creatine. Phosphorus replaced via IV. Mesna done infusing. Currently receiving Emend and then will discharge to home early this evening.

## 2021-12-08 NOTE — TELEPHONE ENCOUNTER
Writer contacted patient to confirm appointment with Dr. Esquivel 12/9/21 10:00 AM. Patient confirmed.    Doug Omalley, EMT

## 2021-12-08 NOTE — PLAN OF CARE
Discharge  D: Orders for discharge and outpatient medications written.  I: Home medications and return to clinic schedule reviewed with patient. Discharge instructions and parameters for calling Health Care Provider reviewed. Patient left at 1700 accompanied by pt wife.   A: PICC line removed. Patient/family verbalized understanding and was ready for discharge.   P: Patient instructed to  medications at home Pharmacy. Follow up as scheduled.

## 2021-12-08 NOTE — DISCHARGE SUMMARY
"Attestation signed by Hubert Simeon MD at 12/8/2021  2:52 PM   Physician Attestation   I, Hubert Simeon MD, personally saw and evaluated Ifrah Huitron as part of a shared visit.  I have reviewed and discussed with the advanced practice provider their discharge plan.     My key history or physical exam findings from the day of discharge:   Patient stable for discharge after Mesna infused.     Vitals: /67 (BP Location: Left arm)   Pulse 87   Temp 97.1  F (36.2  C) (Oral)   Resp 18   Ht 1.702 m (5' 7\")   Wt 64.8 kg (142 lb 13.7 oz)   SpO2 97%   BMI 22.37 kg/m    BMI= Body mass index is 22.37 kg/m .      Key management decisions made by me:      I spent over 35 minutes coordinating this patient's discharge.      Hubert Simeon  Date of Service (when I saw the patient): 12/08/21       [facility]    Discharge Summary  Hematology / Oncology    Date of Admission:  12/1/2021  Date of Discharge:  12/8/2021  Discharging Provider: Chiquita Norris  Date of Service (when I saw the patient): 12/08/21    Discharge Diagnoses   Patient Active Problem List   Diagnosis     Calculus of kidney     Degeneration of lumbar or lumbosacral intervertebral disc     Eczema     Epidural lipomatosis     TUYET (generalized anxiety disorder)     Hypopituitarism (H)     Hypothyroidism     Osteoarthritis of spine with radiculopathy, lumbar region     Pituitary macroadenoma (H)     Rosacea     Chronic lower back pain     Spindle cell sarcoma (H)     Mesothelioma, malignant (H)     Vocal fold paralysis, left     Dysphonia     Muscle tension dysphonia     Mediastinal lymphadenopathy     Inguinal hernia     Chemotherapy-induced nausea     Hypophosphatemia     Anemia     Chemotherapy-induced neutropenia (H)     Sarcoma (H)     Encounter for other specified aftercare       History of Present Illness   Ifrah Huitron is a 71 year old male with history of rosacea, pituitary " macroadenoma s/p resection, CAD, GERD, kidney stones, DJD, and 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 During the admission he had a mild HUSSEIN with Creatinine 1/13 (baseline 0.7-0.8), improved with IVF, and stable transaminitis. Additionally, he had intermittent nausea and associated poor PO intake, as well has mild mucositis and hypophosphatemia. His nausea was fairly controlled with Emend on Days 1 and 7, zyprexa 5mg at bedtime, scheduled zofran, and PRN compazine. Mucositis and thrush are manageable with salt/soda rinses, nystatin, and PRN MMW. He will be discharged with Phos 250mg TID and twice weekly PRN 1L NS for creatinine >=0.9.     He is discharging 12/8 after completing 16 hours of mesna, adjusted from 18 hours due to timing of discharge. Yesterday he was nauseous despite scheduled and PRN anti-emetics, per nursing notes he ate very little and only had sips of water with meds. This morning he reports drinking some soft drinks and a small breakfast. He was encouraged to drink more fluids on discharge such as propel since he doesn't like water. His throat and mouth feel better with nystatin. LBM this morning, no constipation or diarrhea. No heartburn or reflux. A comprehensive review of systems was reviewed with the patient and the pertinent positives are listed in the HPI above. Today (12/8) he was given Emend, 1L LR, and Phos replacement.     To be Addressed at Follow up:  - Follow Phos twice weekly, discharged with Phos 250mg TID and outpatient team will notify patient to adjust PRN  - Follow Potassium twice weekly, if low then the outpatient team will notify him to start K (discharged with 20mEq pills, instructed not to start until low and notified by outpatient team)  - Please address when to do restaging imaging and arrange  next cycle of chemotherapy   - Follow LFTs, they were stable on admission    New Discharge Medications:  - Zyprexa 5mg at bedtime, pt was instructed to discontinue this when his nausea is resolved  - Phos 250mg TID, 14 day supply  - Potassium 20 mEq sent on discharge, pt not instructed to start unless notified   - PRN 1L NS twice weekly for creatinine >=0.9    Next Follow up Appointments:  - 12/9 Neulasta, labs  - Twice weekly Labs (CBC, CMP, Phos) Mon/Thurs 12/13-12/30  - note, with future cycles he would prefer to have labs arranged at the [facility] if able to arrange.They would only be able to do labs, they do not have an infusion center.     Hospital Course   Ifrah Huitron was admitted on 12/1/2021.  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 Wolff. Patient presented to his PCP in 03/2021 with chest/left shoulder and back pain that led to imaging which revealed multiple lung mets, included a left pleural mass. There were difficulties in getting diagnostic biopsy; eventually, biopsy of the mediastinal mass (5/20/21) revealed a poorly characterized malignant spindle cell neoplasm with high PD-L1 expression (90%), Ki-67 70%. 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 Insufficient material to send [facility]. Baseline imaging (6/21/21) revealed progression of lung mets and left-sided subdiaphragmatic mass, stable liver lesions. He was seen by ENT for hoarseness, which was attributed to left true vocal fold motion impairment from mediastinal mass. Given high PD-L1 expression, he was started on Keytruda (C1=6/21/21). Interval imaging (CT 8/2/21) revealed positive response to treatment. He received 6 cycles total, most recently C6=10/4/21. Unfortunately, restaging imaging (10/18/21) revealed interval increase in size of the  LUQ/splenic mass; however, the mediastinal and left pleural mass were stable to slightly decreased in size. He met with Dr. Wolff, who recommended a change in therapy to Doxil + ifosfamide. He was admitted on 10/26/21 to receive Cycle #1 of Doxil/ifos which he tolerated reasonably well with no neurotoxicities, but did have moderate chemotherapy-induced nausea. After discharge he had significant mucositis, poor PO intake, nausea, and lyte derangements. Given adverse side effects, Cycle 2 was delayed by 1 week and doxil was dose reduced to 70mg (previously given 80mg, ie 45mg/m2), Ifosfamide reduced by 10% (1500mg/m2 ? 1350mg/m2). Admitted for Cycle 2 Doxil/Ifos 12/1/21.   - Restaging imaging 11/22/21 with stable/no significant change.   - PICC ordered on admission; removed on discharge.  - OK to start chemotherapy in the setting of ALT/AST elevation per Dr. Wolff  - Concern raised for bilateral hand tremor on 12/4; on 12/6, no tremor noted. Patient did, however, report some intermittent myoclonic jerking of the upper extremities. No other s/sx of neurotoxicity; continue Q8H neuro checks per usual protocol.                                                        Treatment Plan: Doxil + ifosfamide (A6H2=62/1/21).                            - Doxil 70mg IV once - D1                             - Ifosfamide/Mesna 1350 mg/m2 (2510 mg) CIVI - D1-6                             - Mesna 1350 mg/m2 (2510 mg) IV over 18 hrs - starting D7 - administered over 16 hours for earlier discharge                            - Neulasta injection - D8, scheduled 12/9 at the [facility] prior to his appt with ENT appt same day, in the future will continue to schedule at [facility]                            - Emend 150 mg once Day 1 and Day 7      # Cancer-related pain  - Continue PTA Celebrex 200 mg BID   - Continue PTA topical Bengay PRN at bedtime to left back      # Normocytic anemia, mild  Noted intermittently. Likely related to chemo,  hydration, frequent blood draws.  - Monitor  - Transfuse for Hgb <7, will sign blood consent if indicated     RENAL/FEN  # Mild HUSSEIN, resolved  Baseline Cr ~0.8, which increased to 1.13 on 12/3. Similar transient increase noted during C1, presumed due to hypovolemia in the setting of nausea with poor PO intake. S/p 1L NS on 12/3 with subsequent improvement in Cr.  - Follow daily CMP  - No mIVF; bolus PRN.     # Hypophosphatemia, intermittent  # Hypokalemia, intermittent  Secondary to ifosfamide. Required additional phos and K replacement after discharge from C1 Doxil/Ifos.  - Follow Phos and K daily while inpatient  - Start PO phosphorus 250 mg BID x12/6; increased to 250mg TID x12/8 for phos of 2.1  - Replete additional per standing protocol  - Follow BMP and phos with twice weekly labs outpatient     GI  # Transaminitis  Noted in late Nov: , , TBili WNL on 11/29/21. PTA statin was subsequently held. Per outpatient notes, he did not have any abdominal pain or other symptoms concerning for viral hepatitis. Discussed with Dr. Wolff, and it was discussed that this could be due to his statin and OK to proceed with chemotherapy as planned above. He also has known liver metastases which are likely contributing.   - Continue to hold PTA statin  - Follow LFTs daily, stable     # History of chemotherapy-induced nausea  # Indigestion  Improved with the following regimen:  - Scheduled Zofran Q8H  - PRN compazine and Ativan  - Emend 150 mg IV x1 dose on 12/1 and 12/8  - Added Zyprexa 5 mg at bedtime   - Trial Maalox PRN, no significant improvement     # GERD  - Continue PTA omeprazole  - TUMS and Pepto Bismol available PRN      CV  # CAD   Followed by Dr. Gigi Vernon at [facility]. He was noted to have coronary calcium on chest CT. CTA 9/29/21 with moderate mid LAD stenosis. Small to moderate caliber ramus branch with severe diffuse disease. Echo completed 9/29/21 with normal EF and no valvular  dysfunction. Repeat ECHO on 10/27 with EF 60-65%, early diastolic dysfunction but otherwise no change from previous.  - PTA rosuvastatin 40 mg daily HELD for transaminitis, continue to hold on discharge  - Encourage outpatient cardiology follow-up as recommended     # Intermittent elevated blood pressures  BPs ranging from normotensive to hypertensive (with SBP 140s-160) during previous admission. He states that he runs higher at baseline (though this is based on clinic assessments as he does not typically monitor his BP at home). Appears recent intermittent clinic BPs sit at 140s-150s/80s-90s when seen in person, otherwise many of the visits are currently virtual. He is not on any antihypertensive medications PTA. Euvolemic on admission. Suspect he has undiagnosed essential HTN, though BP this admission appears stable 110s/70s.   - Would recommend outpatient PCP follow-up for further discussion of BP monitoring and management     ENDO  # Panhypopituitarism  # Macroadenoma of the pituitary gland  Diagnosed in 2010. Found to have a pituitary macroadenoma, 1.9 cm in largest dimension. Underwent hypophysectomy on 8/23/10. Subsequent presumptive pituitary deficiency, on empiric meds for adrenal insufficiency and thyroid. Previously followed by Dr. Bill Mccall; plans to establish care with Dr. Jamir Grant on 11/11/21.  - Continue levothyroxine  - Continue hydrocortisone 15 mg qAM and 10 mg qPM  - Continue endocrinology follow-up as scheduled     ENT  # Vocal cord dysfunction  Manifested as hoarseness. Seen by ENT (Dr. Kyree Bearden) on 6/21/21 and noted to have left true vocal fold motion impairment from mediastinal mass. Underwent injection of ProLaryn (filler/bulking agent) on 7/1/21, which reportedly has been minimally beneficial. Seen in follow-up by Dr. Bearden on 9/1/21 and noted to have minimal improvement; he was then referred to the [facility] ([facility]).   - No acute inpatient management issues  -  Continue outpatient ENT follow-up as previously scheduled (next scheduled for 12/9/21)     # History of chemotherapy-related mucositis  Noted after C1 Doxil/Ifos. Was using salt/soda rinses, MMW PRN, and nystatin if he were to develop thrush. On admission his mucositis was noted to have resolved.   - Biotene spray PRN  - Salt/soda rinses, MMW PRN     # Oral thrush  Pt has history of thrush after C1 Doxil/Ifos. His right buccal mucosa has a pinpoint white plaque on 12/7.   - Nystatin 1,000,000u swish and spit QID (12/7-continue until resolution)     DERM  # Rosacea  - Continue PTA metronidazole gel     PSYCH  # History of anxiety/depression  Situational, related to malaise/illness. Previously placed health psych referral.  - No acute inpatient management needs     FEN:  - Encouraged PO fluids, bolus PRN  - PRN lyte replacement  - RDAT     Prophy/Misc:  - VTE: ppx lovenox 40mg daily during admission   - GI/PUD: PTA omeprazole, Tums PRN  - Bowels: Senna and Miralax PRN  - Activity: Consider consulting PT if indicated  - COVID vaccination status: Completed 2 doses of Moderna (2nd dose given 4/15/21). Asymptomatic COVID screen negative on admission      Code: Full code  Disposition: Admitted to Oncology Team 3 service for scheduled chemotherapy. Anticipate discharge on 12/8     Seen and discussed with Dr. Blanco Norris, PA-C  Hematology/Oncology  Pager # [phone]    Significant Results and Procedures   None    Pending Results   These results will be followed up by N/A  Unresulted Labs Ordered in the Past 30 Days of this Admission     No orders found from 11/1/2021 to 12/2/2021.          Code Status   Full Code    Primary Care Physician   Sukhdev Kohler    General: alert and cooperative, sitting in chair at bedside, no acute distress  HEENT: sclera anicteric, EOMI, MMM. Pinpoint white plaque on right buccal mucosa  CV: RRR, normal S1/S2, no m/r/g  Resp: CTAB, no wheezing/crackles, normal respiratory  effort on ambient air  GI: soft, non-tender, non-distended, bowel sounds present and normoactive  MSK: warm and well-perfused, no edema  Skin: no rashes on limited exam, no jaundice  Neuro: AOx3, chronic right hemifacial spasm noted, normal speech without dysarthria, moves all extremities equally, normal gait and stance, no resting upper extremity tremor, no appreciable myoclonus  Vascular access: RUE PICC C/D/I, no surrounding erythema or ecchymosis    Time Spent on this Encounter   I, Chiquita Norris PA-C, personally saw the patient today and spent less than or equal to 30 minutes discharging this patient.    Discharge Disposition   Discharged to home  Condition at discharge: Stable    Consultations This Hospital Stay   VASCULAR ACCESS FOR PICC PLACEMENT ADULT IP CONSULT    Discharge Orders      Comprehensive metabolic panel    Twice weekly CMP     Phosphorus    Twice weekly phos     Care Coordination Referral      Reason for your hospital stay    Cycle 2 Doxil/Ifos/Mesna     Activity    Your activity upon discharge: as tolerated     Follow Up and recommended labs and tests    See follow up below     When to contact your care team    [facility] triage line at [phone] for temp > or = 100.4, uncontrolled nausea/vomiting/diarrhea/constipation, unrelieved pain, bleeding not relieved with pressure, dizziness, chest pain, shortness of breath, loss of consciousness, and any new or concerning symptoms.     Discharge Instructions    - Take Phosphorous 3x daily, if the dose needs to stop or be changed you will be notified  - Continue Zyprexa at bedtime until your nausea resolves  - If your potassium is low then the outpatient team will call you to start potassium     Full Code     Diet    Follow this diet upon discharge: regular diet, drink plenty of fluids including water or powerade     CBC with Platelets & Differential    Twice weekly CBC     Discharge Medications   Current Discharge Medication List       START taking these medications    Details   OLANZapine (ZYPREXA) 5 MG tablet Take 1 tablet (5 mg) by mouth At Bedtime Continue until your nausea resolves  Qty: 30 tablet, Refills: 1    Associated Diagnoses: Chemotherapy-induced nausea      phosphorus tablet 250 mg (PHOSPHA 250 NEUTRAL) 250 MG per tablet Take phosphorous 3 times daily starting 12/9. We will follow your phosphorous levels twice weekly, and your outpatient providers will instruct you to adjust the dosing based on your phosphorous levels  Qty: 42 tablet, Refills: 1    Associated Diagnoses: Hypophosphatemia      potassium chloride ER (K-TAB) 20 MEQ CR tablet Take 1 tablet (20 mEq) by mouth daily as needed (If your outpatient providers notify you that your potassium is low) You will receive a call when to take potassium  Qty: 30 tablet, Refills: 0    Associated Diagnoses: Hypokalemia         CONTINUE these medications which have CHANGED    Details   calcium carbonate (TUMS) 500 MG chewable tablet Take 1 tablet (500 mg) by mouth 3 times daily as needed for heartburn         CONTINUE these medications which have NOT CHANGED    Details   acetaminophen (TYLENOL) 500 MG tablet Take 500-1,000 mg by mouth every 6 hours as needed for mild pain      celecoxib (CELEBREX) 200 MG capsule Take 1 capsule (200 mg) by mouth 2 times daily. Note this is a new a strength! Only one capsule at a time!  Qty: 60 capsule, Refills: 3    Comments: This prescription was filled on 10/20/2021. Any refills authorized will be placed on file.  Associated Diagnoses: Cancer associated pain      cholecalciferol (VITAMIN D-1000 MAX ST) 1000 units TABS Take 1,000 Units by mouth daily       guaiFENesin-codeine (GUAIFENESIN AC) 100-10 MG/5ML syrup Take 10 mLs by mouth every 4 hours as needed for cough  Qty: 118 mL, Refills: 0    Associated Diagnoses: Vocal fold paralysis, left; Dysphonia; Muscle tension dysphonia; Mesothelioma, malignant (H); Cough      hydrocortisone (CORTEF) 10 MG  "tablet Take 20 mg in the morning and 10 mg in the afternoon      Insulin Syringe-Needle U-100 27.5G X 5/8\" 2 ML MISC 1 each daily as needed (with solucortef for adrenal crisis)  Qty: 10 each, Refills: 1    Associated Diagnoses: Adrenal insufficiency (H)      levothyroxine (SYNTHROID/LEVOTHROID) 75 MCG tablet Take 75 mcg by mouth every morning       LORazepam (ATIVAN) 0.5 MG tablet Take 1 tablet (0.5 mg) by mouth every 4 hours as needed for nausea or vomiting . Caution: causes sedation.  Qty: 30 tablet, Refills: 0    Associated Diagnoses: Chemotherapy-induced nausea      Menthol-Methyl Salicylate (DALIA MALIK GREASELESS) cream Apply topically every 6 hours as needed      metroNIDAZOLE (METROGEL) 0.75 % external gel Apply topically 2 times daily  Qty: 45 g, Refills: 11    Associated Diagnoses: Rosacea      nystatin (MYCOSTATIN) 660224 UNIT/ML suspension Take 5 mLs (500,000 Units) by mouth 4 times daily . Start at first signs of white coating on tongue.  Qty: 473 mL, Refills: 0    Associated Diagnoses: Thrush      ondansetron (ZOFRAN) 4 MG tablet Take 1-2 tablets (4-8 mg) by mouth every 8 hours as needed for nausea  Qty: 40 tablet, Refills: 1    Associated Diagnoses: Chemotherapy-induced nausea      prochlorperazine (COMPAZINE) 5 MG tablet Take 1 tablet (5 mg) by mouth every 6 hours as needed for nausea or vomiting . Caution: causes sedation.  Qty: 30 tablet, Refills: 1    Associated Diagnoses: Chemotherapy-induced nausea      triamcinolone (KENALOG) 0.1 % external cream Apply topically 2 times daily       omeprazole (PRILOSEC) 20 MG DR capsule Take 1 capsule (20 mg) by mouth 2 times daily  Qty: 60 capsule, Refills: 1           Allergies   Allergies   Allergen Reactions     Penicillins Hives and Swelling            Data   Most Recent 3 CBC's:Recent Labs   Lab Test 12/08/21  0635 12/07/21  0556 12/06/21  0656   WBC 5.1 6.7 6.9   HGB 9.7* 10.7* 10.3*   MCV 88 88 88    178 187      Most Recent 3 BMP's:  Recent Labs "   Lab Test 12/08/21  0635 12/07/21  1119 12/07/21  0556 12/06/21  0656     --  141 142   POTASSIUM 3.6 3.7 3.4 3.6   CHLORIDE 114*  --  112* 111*   CO2 23  --  24 26   BUN 18  --  20 17   CR 0.92  --  0.77 0.80   ANIONGAP 5  --  5 5   COTY 9.0  --  9.2 9.0   GLC 94  --  93 88     Most Recent 2 LFT's:  Recent Labs   Lab Test 12/08/21  0635 12/07/21  0556   AST 42 56*   ALT 92* 128*   ALKPHOS 320* 379*   BILITOTAL 0.3 0.4     Most Recent INR's and Anticoagulation Dosing History:  Anticoagulation Dose History     Recent Dosing and Labs Latest Ref Rng & Units 4/29/2021 12/1/2021    INR 0.85 - 1.15 1.13 1.14        Most Recent 3 Troponin's:  Recent Labs   Lab Test 08/02/21  1458 05/23/19  0909   TROPI  --  <0.015   TROPONIN <0.015  --      Most Recent Cholesterol Panel:No lab results found.  Most Recent 6 Bacteria Isolates From Any Culture (See EPIC Reports for Culture Details):  Recent Labs   Lab Test 05/20/21  1254 05/23/19  0943 05/23/19  0911   CULT Culture negative after 4 weeks  No anaerobes isolated  Since this specimen was not transported in the proper anaerobic transport media, the   absence of anaerobes in this culture does not rule out the presence of anaerobes in this   specimen.    No growth No growth No growth     Most Recent TSH, T4 and A1c Labs:  Recent Labs   Lab Test 11/18/21  0855 10/04/21  1348   TSH  --  0.74   T4 1.46  --

## 2021-12-08 NOTE — PLAN OF CARE
[id number]  VSS. Alert and oriented. No complaints of pain. Nausea well controlled. Mesna hung this morning at 0030. Plan is to discharge late this afternoon after Mesna is complete. No acute events, continue with POC.

## 2021-12-09 ENCOUNTER — PRE VISIT (OUTPATIENT)
Dept: OTOLARYNGOLOGY | Facility: CLINIC | Age: 71
End: 2021-12-09

## 2021-12-09 ENCOUNTER — OFFICE VISIT (OUTPATIENT)
Dept: OTOLARYNGOLOGY | Facility: CLINIC | Age: 71
End: 2021-12-09
Attending: OTOLARYNGOLOGY
Payer: MEDICARE

## 2021-12-09 ENCOUNTER — THERAPY VISIT (OUTPATIENT)
Dept: SPEECH THERAPY | Facility: CLINIC | Age: 71
End: 2021-12-09
Payer: MEDICARE

## 2021-12-09 ENCOUNTER — INFUSION THERAPY VISIT (OUTPATIENT)
Dept: ONCOLOGY | Facility: CLINIC | Age: 71
End: 2021-12-09
Attending: INTERNAL MEDICINE
Payer: MEDICARE

## 2021-12-09 ENCOUNTER — VIRTUAL VISIT (OUTPATIENT)
Dept: PALLIATIVE CARE | Facility: CLINIC | Age: 71
End: 2021-12-09
Attending: INTERNAL MEDICINE
Payer: MEDICARE

## 2021-12-09 ENCOUNTER — APPOINTMENT (OUTPATIENT)
Dept: LAB | Facility: CLINIC | Age: 71
End: 2021-12-09
Attending: INTERNAL MEDICINE
Payer: MEDICARE

## 2021-12-09 ENCOUNTER — PATIENT OUTREACH (OUTPATIENT)
Dept: NURSING | Facility: CLINIC | Age: 71
End: 2021-12-09
Payer: MEDICARE

## 2021-12-09 VITALS
TEMPERATURE: 98.2 F | WEIGHT: 147.7 LBS | RESPIRATION RATE: 16 BRPM | DIASTOLIC BLOOD PRESSURE: 85 MMHG | SYSTOLIC BLOOD PRESSURE: 130 MMHG | HEART RATE: 93 BPM | OXYGEN SATURATION: 99 % | BODY MASS INDEX: 23.13 KG/M2

## 2021-12-09 VITALS
WEIGHT: 147 LBS | HEIGHT: 67 IN | OXYGEN SATURATION: 99 % | HEART RATE: 93 BPM | BODY MASS INDEX: 23.07 KG/M2 | TEMPERATURE: 98.2 F

## 2021-12-09 DIAGNOSIS — R13.12 OROPHARYNGEAL DYSPHAGIA: Primary | ICD-10-CM

## 2021-12-09 DIAGNOSIS — C49.9 SARCOMA (H): Primary | ICD-10-CM

## 2021-12-09 DIAGNOSIS — K12.30 STOMATITIS AND MUCOSITIS: Primary | ICD-10-CM

## 2021-12-09 DIAGNOSIS — C45.9 MESOTHELIOMA, MALIGNANT (H): ICD-10-CM

## 2021-12-09 DIAGNOSIS — D70.1 CHEMOTHERAPY-INDUCED NEUTROPENIA (H): ICD-10-CM

## 2021-12-09 DIAGNOSIS — J38.01 VOCAL FOLD PARALYSIS, LEFT: ICD-10-CM

## 2021-12-09 DIAGNOSIS — R49.0 MUSCLE TENSION DYSPHONIA: ICD-10-CM

## 2021-12-09 DIAGNOSIS — C49.9 SPINDLE CELL SARCOMA (H): ICD-10-CM

## 2021-12-09 DIAGNOSIS — T45.1X5A CHEMOTHERAPY-INDUCED NEUTROPENIA (H): ICD-10-CM

## 2021-12-09 DIAGNOSIS — R05.9 COUGH: ICD-10-CM

## 2021-12-09 DIAGNOSIS — K12.1 STOMATITIS AND MUCOSITIS: Primary | ICD-10-CM

## 2021-12-09 DIAGNOSIS — B37.0 THRUSH: Primary | ICD-10-CM

## 2021-12-09 DIAGNOSIS — R49.0 DYSPHONIA: Primary | ICD-10-CM

## 2021-12-09 DIAGNOSIS — Z51.89 ENCOUNTER FOR OTHER SPECIFIED AFTERCARE: ICD-10-CM

## 2021-12-09 DIAGNOSIS — R49.0 DYSPHONIA: ICD-10-CM

## 2021-12-09 LAB
ALBUMIN SERPL-MCNC: 3.2 G/DL (ref 3.4–5)
ALP SERPL-CCNC: 355 U/L (ref 40–150)
ALT SERPL W P-5'-P-CCNC: 97 U/L (ref 0–70)
ANION GAP SERPL CALCULATED.3IONS-SCNC: 3 MMOL/L (ref 3–14)
AST SERPL W P-5'-P-CCNC: 57 U/L (ref 0–45)
BASOPHILS # BLD MANUAL: 0 10E3/UL (ref 0–0.2)
BASOPHILS NFR BLD MANUAL: 0 %
BILIRUB SERPL-MCNC: 0.6 MG/DL (ref 0.2–1.3)
BUN SERPL-MCNC: 21 MG/DL (ref 7–30)
CALCIUM SERPL-MCNC: 9.5 MG/DL (ref 8.5–10.1)
CHLORIDE BLD-SCNC: 114 MMOL/L (ref 94–109)
CO2 SERPL-SCNC: 23 MMOL/L (ref 20–32)
CREAT SERPL-MCNC: 1.06 MG/DL (ref 0.66–1.25)
EOSINOPHIL # BLD MANUAL: 0.2 10E3/UL (ref 0–0.7)
EOSINOPHIL NFR BLD MANUAL: 4 %
ERYTHROCYTE [DISTWIDTH] IN BLOOD BY AUTOMATED COUNT: 15.9 % (ref 10–15)
GFR SERPL CREATININE-BSD FRML MDRD: 69 ML/MIN/1.73M2
GLUCOSE BLD-MCNC: 89 MG/DL (ref 70–99)
HCT VFR BLD AUTO: 33.7 % (ref 40–53)
HGB BLD-MCNC: 10.6 G/DL (ref 13.3–17.7)
LYMPHOCYTES # BLD MANUAL: 1.1 10E3/UL (ref 0.8–5.3)
LYMPHOCYTES NFR BLD MANUAL: 17 %
MAGNESIUM SERPL-MCNC: 2.6 MG/DL (ref 1.6–2.3)
MCH RBC QN AUTO: 27.4 PG (ref 26.5–33)
MCHC RBC AUTO-ENTMCNC: 31.5 G/DL (ref 31.5–36.5)
MCV RBC AUTO: 87 FL (ref 78–100)
MONOCYTES # BLD MANUAL: 0.2 10E3/UL (ref 0–1.3)
MONOCYTES NFR BLD MANUAL: 3 %
NEUTROPHILS # BLD MANUAL: 4.7 10E3/UL (ref 1.6–8.3)
NEUTROPHILS NFR BLD MANUAL: 76 %
PHOSPHATE SERPL-MCNC: 2.4 MG/DL (ref 2.5–4.5)
PLAT MORPH BLD: NORMAL
PLATELET # BLD AUTO: 185 10E3/UL (ref 150–450)
POTASSIUM BLD-SCNC: 3.4 MMOL/L (ref 3.4–5.3)
PROT SERPL-MCNC: 6.6 G/DL (ref 6.8–8.8)
RBC # BLD AUTO: 3.87 10E6/UL (ref 4.4–5.9)
RBC MORPH BLD: NORMAL
SODIUM SERPL-SCNC: 140 MMOL/L (ref 133–144)
WBC # BLD AUTO: 6.2 10E3/UL (ref 4–11)

## 2021-12-09 PROCEDURE — 92610 EVALUATE SWALLOWING FUNCTION: CPT | Mod: GN | Performed by: SPEECH-LANGUAGE PATHOLOGIST

## 2021-12-09 PROCEDURE — 84100 ASSAY OF PHOSPHORUS: CPT

## 2021-12-09 PROCEDURE — 96372 THER/PROPH/DIAG INJ SC/IM: CPT | Performed by: PHYSICIAN ASSISTANT

## 2021-12-09 PROCEDURE — 85027 COMPLETE CBC AUTOMATED: CPT

## 2021-12-09 PROCEDURE — 99214 OFFICE O/P EST MOD 30 MIN: CPT | Mod: 95 | Performed by: INTERNAL MEDICINE

## 2021-12-09 PROCEDURE — 99214 OFFICE O/P EST MOD 30 MIN: CPT | Mod: 25 | Performed by: OTOLARYNGOLOGY

## 2021-12-09 PROCEDURE — 92524 BEHAVRAL QUALIT ANALYS VOICE: CPT | Mod: GN | Performed by: SPEECH-LANGUAGE PATHOLOGIST

## 2021-12-09 PROCEDURE — 36415 COLL VENOUS BLD VENIPUNCTURE: CPT

## 2021-12-09 PROCEDURE — 80053 COMPREHEN METABOLIC PANEL: CPT

## 2021-12-09 PROCEDURE — 250N000011 HC RX IP 250 OP 636: Performed by: PHYSICIAN ASSISTANT

## 2021-12-09 PROCEDURE — 83735 ASSAY OF MAGNESIUM: CPT

## 2021-12-09 PROCEDURE — 92613 ENDOSCOPY SWALLOW (FEES) I&R: CPT | Performed by: OTOLARYNGOLOGY

## 2021-12-09 PROCEDURE — 31579 LARYNGOSCOPY TELESCOPIC: CPT | Mod: 51 | Performed by: OTOLARYNGOLOGY

## 2021-12-09 PROCEDURE — 92612 ENDOSCOPY SWALLOW (FEES) VID: CPT | Mod: GN | Performed by: OTOLARYNGOLOGY

## 2021-12-09 RX ORDER — FLUCONAZOLE 200 MG/1
200 TABLET ORAL DAILY
Qty: 7 TABLET | Refills: 0 | Status: SHIPPED | OUTPATIENT
Start: 2021-12-09 | End: 2021-12-15

## 2021-12-09 RX ORDER — DOXEPIN HYDROCHLORIDE 10 MG/ML
20 SOLUTION ORAL EVERY 4 HOURS PRN
Qty: 118 ML | Refills: 3 | Status: SHIPPED | OUTPATIENT
Start: 2021-12-09 | End: 2022-03-09

## 2021-12-09 RX ADMIN — PEGFILGRASTIM 6 MG: 6 INJECTION SUBCUTANEOUS at 10:18

## 2021-12-09 ASSESSMENT — PAIN SCALES - GENERAL
PAINLEVEL: NO PAIN (0)
PAINLEVEL: NO PAIN (0)

## 2021-12-09 ASSESSMENT — ACTIVITIES OF DAILY LIVING (ADL): DEPENDENT_IADLS:: INDEPENDENT

## 2021-12-09 ASSESSMENT — MIFFLIN-ST. JEOR: SCORE: 1370.42

## 2021-12-09 NOTE — LETTER
12/9/2021       RE: Ifrah Huitron  [address] [place] [address]     Dear Colleague,    Thank you for referring your patient, Ifrah Huitron, [facility]. Please see a copy of my visit note below.    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 Pembro started summer 2021.  8/2021 scan shows response to pembro.  10/2021 progression-->doxil+ifosfamide complicated by mucositis, nausea, thrush, need for dose reduction  12/2021 scans show stable disease; continue doxil/ifos     Course complicated by dysphonia, working with SLP summer 2021.Vocal cord dysfunction, follows ENT.  Has surgical hypopituitarism     Social - Lives with wife Abida; 5 adult kids, 19 grandkids. Runs a golf course.      Care Planning - ACP discussion 6/2021 palliative visit.      History:  History gathered today from: patient, medical chart    He had lots of issues with Doxil/ifosfamide--mucositis, nausea, thrush. Notes fluc really helped. Doing salt and soda also. Dysgeusia. He did not like magic mouthwash. Maynor REDMAN has reordered the fluc again.     He saw ENT yesterday for dysphonia & dysphagia. No thrush noted then.    Pain: cancer pain has overall has improved with his chemo, a lot. Continues celebrex--once held it and his pain was awful; takes it bid.       PE: There were no vitals taken for this visit.   Wt Readings from Last 3 Encounters:   12/09/21 66.7 kg (147 lb)   12/09/21 67 kg (147 lb 11.2 oz)   12/08/21 64.8 kg (142 lb 13.7 oz)     Alert  NAD  Clear sensorium full affect      Data reviewed:  I reviewed recent labs and imaging, my comments:  Cr 1  Alb 3.2  LFTs up  Plt normal    CT Nov   Chest: The previously seen left pleural effusion is smaller. Trace  left pleural fluid remains. The previously seen irregular shaped  anterior  mediastinal mass is stable to slightly smaller compared to  the prior study. It measures 2.3 x 1.9 x 4.5 cm. Comparable  measurements on the prior study are 2.6 x 2.3 x 4.4 cm.  A nodule at  the anterior pericardial margin on axial image 35 is unchanged at 0.9  cm. Focal pleural thickening anterolaterally in the left hemithorax on  axial image 24 is stable.     Abdomen: A lobulated left upper quadrant mass intimately related to  the left hemidiaphragm and with suspected splenic invasion is again  seen. It has not significantly changed from the prior study. It  measures 7.7 x 5.8 x 4.7 cm. Comparable measurements on the prior  study are 7.4 x 5.8 x 4.6 cm. Subcentimeter liver lesions continue to  be stable.  Small cysts are seen in each kidney are unchanged.     Pelvis: No significant abnormality is demonstrated.                                                                      IMPRESSION:  Overall stable neoplastic disease. No new findings.    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 He should continue salt and soda rinses; put an hour between different rinses    He is otherwise doing ok. Coping well. He very much considers the side effects of his chemo as worth it and wants to continue chemo for the foreseeable future.     31 minutes spent on the date of the encounter doing chart review, history and exam, patient education & counseling, documentation and other activities as noted above.    Thank you for involving us in the patient's care.   Sal Handy MD / Palliative Medicine / Text me via [facility].        Again, thank you for allowing me to participate in the care of your patient.      Sincerely,    Sal Handy MD

## 2021-12-09 NOTE — NURSING NOTE
Chief Complaint   Patient presents with     Blood Draw     Labs drawn via  by RN in lab. VS taken.      Labs collected from venipuncture by RN. Vitals taken. Checked in for appointment(s).    Lourdes Rangel RN

## 2021-12-09 NOTE — PATIENT INSTRUCTIONS
1.  You were seen in the ENT Clinic today by . If you have any questions or concerns after your appointment, please call [phone]. Press option #1 for scheduling related needs. Press option #3 for Nurse advice.    2.   has recommended  the following:   - voice therapy   -swallow precautions    3.  Plan is to return to clinic in 3months(March)      Gertrudis Kurtz LPN  [phone]  [facility] - Otolaryngology

## 2021-12-09 NOTE — NURSING NOTE
"Chief Complaint   Patient presents with     Consult     New patient       Pulse 93, temperature 98.2  F (36.8  C), temperature source Temporal, height 1.702 m (5' 7\"), weight 66.7 kg (147 lb), SpO2 99 %.    Doug Omalley, EMT  "

## 2021-12-09 NOTE — LETTER
"12/9/2021       RE: Ifrah Huitron  [address] [place] [address]     Dear Colleague,    Thank you for referring your patient, Ifrah Huitron, to the [facility] [place] [facility]. Please see a copy of my visit note below.    [facility]  Baldomero Boswell Jr., M.D., F.A.C.S.  Veronique Forbes M.D., M.P.H.  Sherry Esquivel M.D.  Tasha Bowie, Ph.D., CCC-SLP  Neil Smith, Ph.D., [facility]-SLP  Janie Madrigal M.M. (voice), M.NORMA., CCC-SLP  Danilo Lu M.M. (voice), M.A., CCC-SLP  CHERELLE Kraft (voice), M.S., CCC-SLP    [facility]  VOICE/SPEECH/BREATHING EVALUATION AND LARYNGEAL EXAMINATION REPORT    Patient: Ifrah Huitron  Date of Visit: 12/9/2021    Clinician: Tasha Bowie, Ph.D., CCC/SLP  Seen in conjunction with: Dr. Sherry Esquivel    CHIEF COMPLAINT:  voice    HISTORY  Ifrah Huitron is a 71 year old presenting today for evaluation of dysphonia.    Please refer to Dr. Esquivel s dictation for a more complete history and impressions.   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 Bearden in July, no improvement    Apparently Dr. Bearden stated \"it didn't take\"    Did some speech therapy with SLP Jemima Mantilla at [facility]; unclear if this was helpful    Voice has been improving in the past month or so    He thinks he still sounds \"funny\" and voice feels \"leaky\"(resulting in dizziness) but others comment on the improvement    Also contant dry sore throat, occasional cough episodes, and occasional aspiration    previous breathing problems have subsided    DIAGNOSIS/REASON FOR REFERRAL  Dysphonia/ Evaluate, perform laryngeal exam, treat as appropriate    CURRENT SYMPTOMS INCLUDE:  VOICE    Primary concern, despite unremarkable voice use  COUGH/THROAT CLEARING    Occasional; not a " particular problem  SWALLOWING    Aware of aspiration several times per week  ADDITIONAL    Dry sore throat    Breathing problems have improved    OTHER PERTINENT HISTORY    Complex medical Hx; please see Dr. Esquivel's Misono's note    OBJECTIVE FINDINGS  VOICE/ SPEECH/ NON-COMMUNICATIVE LARYNGEAL BEHAVIORS EVALUATION  An evaluation of the voice was accomplished today; salient features are as follows:    Left facial hemiparalysis; he states 20 year Hx with unknown etiology:    Cough/ Throat clear:    Not observed    Breathing pattern:    Appears within normal limits and adequate     No overt tension is evident.    Voice quality is characterized by    Slight roughness iw th occasional slight diplophonia; t his improves as he continues talking    Mildly asthenic at first; this also improves somewhat    No apparent use of techniques (e.g. taking adequate breath before talking); he states the techniques came naturally    Generally fairly clear quality; within cultural normal limits    Habitual pitch is WNL and appropriate, at around 125 Hz    Loudness is adequate for the setting; not fully tested    Sustained vowels:     Comfortable pitch /i/: 125 Hz; starts rough, progresses to diplophonia, then marked glottal [place]    Maximum phonation time is 19 seconds; sustained vowel is rough, becoming markedly so    In a pitch glide task to determine pitch range, he demonstrates essentially normal range, but poor skill for the task    Highest pitch: 288 Hz; upper register adjustment    Lowest pitch:  90 Hz    CAPE-V Overall Severity:   25/100    LARYNGEAL EXAMINATION    Endoscopic laryngeal examination was performed by:  Dr. Esquivel  I provided the protocol of instructions for the patient.  Type of exam:   Flexible endoscopy with chip-tip technology and stroboscopy,    This exam shows:    Laryngeal and Vocal Fold Mucosa    Essentially healthy laryngeal mucosa    Presence of secretions is unremarkable    Status of vocal fold mucosa:    o Mildly dry  o Otherwise within normal limits, with no lesions and straight vibratory margins    Neurological and Functional Integrity of the Larynx    Left vocal fold is immobile near the midline  o Also slightly bowed, especially during conversational speech    Right vocal fold comes to midline to provide weak glottic closure  o Closure is especially weak at the midfold    Airway is adequate    Left arytenoid position is droopy, often obscuring the view of the vocal fold    Weakness of movement is evident during a task of 20 quickly repeated vowels    Elongation of the vocal folds for pitch increase is somewhat limited; poor skill for the task    Left vocal fold sometimes appears to be at a slightly lower vertical level during phonatory tasks    Supraglottic Function and Therapy Probes    Moderate anterior-posterior constriction of the supraglottic larynx during connected speech    Variable moderate to marked ventricular approximation during phonation;   o There is also some redundant tissue of the ventricular folds anteriorly; this many sometimes result in an additional source of vibration noise    Stroboscopy provided the following additional information:    Stroboscopy shows a normal mucosal wave at habitual pitch, but weak glottic closure and out-of-phase mucosal wave at higher and lower pitch    Stroboscopy was limited due to lack of entrainment, but brief glimpses show    Stroboscopy was accomplished but view of vibratory characteristics was somewhat obscured by supraglottic structures; limited view shows    Glottic closure is weak at the midfold; open phase predominates, and the right mucosal wave is floppy and sometimes asymmetrical    Mucosal wave can be WNL at some pitch/volume levels    Dr. Esquivel and I reviewed this laryngeal exam with Mr. Huitron today, and I provided pertinent explanations:    Endoscopic findings are consistent with audio-perceptual assessment and patient Hx/complaints.    his  symptoms are accounted for by the left vocal fold immobility with weak glottic closure and compensatory supraglottic hyperfunction    Because he seems to be improving currently, we agreed to hold off on any medical intervention, and try to optimize his voice with speech therapy in this facility     ASSESSMENT / PLAN  IMPRESSIONS:  This evaluation has resulted in the following diagnosis/diagnoses for Mr. 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    He demonstrates a Good prognosis for improvement given adherence to therapeutic recommendations. Therapeutic     Positive indicators: commitment to process; diagnosis is known to respond to functional treatment;     Negative indicators: none    However, prognosis for complete resolution of dysphonia is guarded, as there is limitation due to left vocal fold paralysis      Research: This patient was not approached about participation in research.      TREATMENT PLAN  Speech therapy    DURATION/FREQUENCY OF TREATMENT  Four weekly or bi-weekly one-hour sessions, with four monthly one-hour follow-up sessions, as well as follow-up sessions along with Dr. Esquivel    GOALS  Patient goal:    To understand the problem and fix it as much as possible  To have a normal and acceptable voice quality    Long-term goal(s): In 9 months, Mr. Huitron will:  1.  Report a week of typical vocal activities, in which dysphonia and vocal effort do not exceed a level of 2 out of 10, 80% of the time   2.  In a 20-minute conversation task, demonstrate roughness and asthenia that do not exceed a level of 3 out of  10, 80% of the time, by therapist judgment    Certification period: December 9, 2021 - March 9, 2022    This treatment plan was developed with the patient who agreed with the recommendations.      TOTAL SERVICE TIME: 45 minutes  EVALUATION OF VOICE AND RESONANCE (13723)  NO CHARGE FACILITY FEE       Tasha Bowie, Ph.D., [facility]-SLP  Speech-Language Pathologist  Director, [facility]  [phone]                                                                                             Outpatient Speech Language Therapy Evaluation  PLAN OF TREATMENT FOR OUTPATIENT REHABILITATION  (COMPLETE FOR INITIAL CLAIMS ONLY)  Patient's Last Name, First Name, M.I.  YOB: 1950  Ifrah Huitron                        Provider's Name  Tasha Bowie, SLP Medical Record No.  [id number]                               Onset Date:  12/09/21   Start of Care Date: 12/09/21     Type: Speech Language Therapy Medical Diagnosis: Dysphonia                        Therapy Diagnosis:  Dysphonia.   Visits from SOC:  1   _________________________________________________________________________________  Plan of Treatment:   Speech therapy    DURATION/FREQUENCY OF TREATMENT  Four weekly or bi-weekly one-hour sessions, with four monthly one-hour follow-up sessions, as well as follow-up sessions along with Dr. Esquivel    GOALS  Patient goal:    To understand the problem and fix it as much as possible  To have a normal and acceptable voice quality    Long-term goal(s): In 9 months, Mr. Huitron will:  1.  Report a week of typical vocal activities, in which dysphonia and vocal effort do not exceed a level of 2 out of 10, 80% of the time   2.  In a 20-minute conversation task, demonstrate roughness and asthenia that do not exceed a level of 3 out of 10, 80% of the time, by therapist judgment    _________________________________________________________________________________    I CERTIFY THE NEED FOR THESE SERVICES FURNISHED UNDER         THIS PLAN OF TREATMENT AND WHILE UNDER MY CARE     (Physician attestation of this document indicates review and certification of the therapy plan).     Certification date from: 12/09/21  Certification date to: 3/9/22    Referring Provider: Sherry Esquivel MD         Again, thank you for allowing me to participate in the care of your patient.      Sincerely,    Tasha Bowie, SLP

## 2021-12-09 NOTE — PROGRESS NOTES
Ifrah is a 71 year old who is being evaluated via a billable video visit.      How would you like to obtain your AVS? MyChart  If the video visit is dropped, the invitation should be resent by: Text to cell phone: [phone]  Will anyone else be joining your video visit? 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 Pembro started summer 2021.  8/2021 scan shows response to pembro.  10/2021 progression-->doxil+ifosfamide complicated by mucositis, nausea, thrush, need for dose reduction  12/2021 scans show stable disease; continue doxil/ifos     Course complicated by dysphonia, working with SLP summer 2021.Vocal cord dysfunction, follows ENT.  Has surgical hypopituitarism     Social - Lives with wife Abida; 5 adult kids, 19 grandkids. Runs a golf course.      Care Planning - ACP discussion 6/2021 palliative visit.      History:  History gathered today from: patient, medical chart    He had lots of issues with Doxil/ifosfamide--mucositis, nausea, thrush. Notes fluc really helped. Doing salt and soda also. Dysgeusia. He did not like magic mouthwash. Maynor REDMAN has reordered the fluc again.     He saw ENT yesterday for dysphonia & dysphagia. No thrush noted then.    Pain: cancer pain has overall has improved with his chemo, a lot. Continues celebrex--once held it and his pain was awful; takes it bid.       PE: There were no vitals taken for this visit.   Wt Readings from Last 3 Encounters:   12/09/21 66.7 kg (147 lb)   12/09/21 67 kg (147 lb 11.2 oz)   12/08/21 64.8 kg (142 lb 13.7 oz)     Alert  NAD  Clear sensorium full affect      Data reviewed:  I reviewed recent labs and imaging, my comments:  Cr 1  Alb 3.2  LFTs up  Plt normal    CT Nov   Chest: The previously seen left pleural effusion is smaller. Trace  left pleural fluid remains. The previously seen irregular shaped  anterior  mediastinal mass is stable to slightly smaller compared to  the prior study. It measures 2.3 x 1.9 x 4.5 cm. Comparable  measurements on the prior study are 2.6 x 2.3 x 4.4 cm.  A nodule at  the anterior pericardial margin on axial image 35 is unchanged at 0.9  cm. Focal pleural thickening anterolaterally in the left hemithorax on  axial image 24 is stable.     Abdomen: A lobulated left upper quadrant mass intimately related to  the left hemidiaphragm and with suspected splenic invasion is again  seen. It has not significantly changed from the prior study. It  measures 7.7 x 5.8 x 4.7 cm. Comparable measurements on the prior  study are 7.4 x 5.8 x 4.6 cm. Subcentimeter liver lesions continue to  be stable.  Small cysts are seen in each kidney are unchanged.     Pelvis: No significant abnormality is demonstrated.                                                                      IMPRESSION:  Overall stable neoplastic disease. No new findings.    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 He should continue salt and soda rinses; put an hour between different rinses    He is otherwise doing ok. Coping well. He very much considers the side effects of his chemo as worth it and wants to continue chemo for the foreseeable future.     31 minutes spent on the date of the encounter doing chart review, history and exam, patient education & counseling, documentation and other activities as noted above.    Thank you for involving us in the patient's care.   Sal Handy MD / Palliative Medicine / Text me via [facility].          Video-Visit Details  Video Start Time: 350p  Type of service:  Video Visit  Video End Time:4:14 PM  Originating Location (pt. Location): Home  Distant Location (provider location): Home  Platform used for Video Visit: [email or web address]

## 2021-12-09 NOTE — TELEPHONE ENCOUNTER
[facility] Triage    Nystatin started due to white spots and sore throat, for a day it improved.  Still taking Nystatin and will continue taking nystatin.  Fluconazole - picked that up today.    Speech today, blood work and shot.    Veronique also looked at his legs as they were puffy.     She consulted with Maynor.    TORB: Veronique Rogers RN/Maynor Rios, SHARON  -pt to keep his legs elevated and to wear compression stockings. Pt should call the clinic if swelling doesn't decrease in a few days or if it becomes painful and an ultrasound will be ordered. Swelling most likely due to extra fluids being in patient.  -Fluconazole prescribed and sent to his local pharmacy for thrush if needed.  -aware of lab results, no changes needed, will have them redraw on Monday as scheduled. Pt is taking phosphorus already three times a day and numbers are trending up.    [place] was incredibly pleased with how Veronique took care of him and addressed all his needs.  He left feeling less overwhelmed and a smile on his face.    Routing to Maynor Rios and Veronique Rogers

## 2021-12-09 NOTE — PATIENT INSTRUCTIONS
Appt scheduled for pre op Fluconazole was sent to your pharmacy to start if the nystatin doesn't work  Keep legs elevated to help reduce swelling, wear compression socks. Call the clinic if swelling doesn't improve in a few days or becomes painful.       [facility] Triage and after hours / weekends / holidays:  [phone]    Please call the triage or after hours line if you experience a temperature greater than or equal to 100.5, shaking chills, have uncontrolled nausea, vomiting and/or diarrhea, dizziness, shortness of breath, chest pain, bleeding, unexplained bruising, or if you have any other new/concerning symptoms, questions or concerns.      If you are having any concerning symptoms or wish to speak to a provider before your next infusion visit, please call your care coordinator or triage to notify them so we can adequately serve you.     If you need a refill on a narcotic prescription or other medication, please call before your infusion appointment.                 December 2021 Sunday Monday Tuesday Wednesday Thursday Friday Saturday                  1    Admission   5:10 PM   Hubert Simeon MD   [facility] ED [address] [place]   (Discharge: 12/8/2021) 2     3     4       5     6     7     8     9    LAB CENTRAL   8:30 AM   (15 min.)    [facility] DRAW   [facility]    ONC INFUSION 1 HR (60 MIN)   9:00 AM   (60 min.)    ONC INFUSION NURSE   [facility] NEW VOICE   9:45 AM   (60 min.)   Sherry Esquivel MD   [facility]-ADULT ENT EVAL  10:00 AM   (60 min.)   Ami Wong, SLP   [facility] SPEECH PATH AND ENT EVAL  10:45 AM   (30 min.)   Tasha Bowie, SLP   [facility]    VIDEO VISIT RETURN   3:25 PM   (40 min.)   Sal Handy MD   [facility] 10     11       12     13    LAB    1:50 PM   (10 min.)   [facility]    INFUSION 1 HR (60 MIN)   2:00 PM   (60 min.)   [place] [facility] INFUSION NURSE   [facility] 14     15    VIDEO VISIT RETURN   9:00 AM   (60 min.)   Maynor Rios PA   [facility] [place]    LAB   1:50 PM   (10 min.)   [facility]    INFUSION 1 HR (60 MIN)   2:00 PM   (60 min.)   [place] CAN[place] INFUSION NURSE   [facility] 16    LAB   2:20 PM   (10 min.)   [facility]    INFUSION 1 HR (60 MIN)   2:30 PM   (60 min.)   [place] INFUSION NURSE   [facility] 17     18       19     20    LAB   1:50 PM   (10 min.)   [facility]    INFUSION 1 HR (60 MIN)   2:00 PM   (60 min.)   [place] CAN[place] INFUSION NURSE   [facility] 21     22     23    LAB   1:50 PM   (10 min.)   [facility]    INFUSION 1 HR (60 MIN)   2:00 PM   (60 min.)   [place] INFUSION NURSE   [facility] 24     25       26     27    LAB   1:50 PM   (10 min.)   [facility]    INFUSION 1 HR (60 MIN)   2:00 PM   (60 min.)   [place] INFUSION NURSE   [facility] 28     29     30    LAB   1:50 PM   (10 min.)   [facility]    INFUSION 1 HR (60 MIN)   2:00 PM   (60 min.)   [place] INFUSION NURSE   [facility] 31 January 2022 Sunday Monday Tuesday Wednesday Thursday Friday Saturday                                 1       2     3     4     5     6     7     8       9     10     11     12     13     14     15       16     17     18     19     20     21     22       23     24     25     26     27     28     29        30     31                                              Lab Results:  Recent Results (from the past 12 hour(s))   Phosphorus    Collection Time: 12/09/21  9:14 AM   Result Value Ref Range    Phosphorus 2.4 (L) 2.5 - 4.5 mg/dL   Comprehensive metabolic panel    Collection Time: 12/09/21  9:14 AM   Result Value Ref Range    Sodium 140 133 - 144 mmol/L    Potassium 3.4 3.4 - 5.3 mmol/L    Chloride 114 (H) 94 - 109 mmol/L    Carbon Dioxide (CO2) 23 20 - 32 mmol/L    Anion Gap 3 3 - 14 mmol/L    Urea Nitrogen 21 7 - 30 mg/dL    Creatinine 1.06 0.66 - 1.25 mg/dL    Calcium 9.5 8.5 - 10.1 mg/dL    Glucose 89 70 - 99 mg/dL    Alkaline Phosphatase 355 (H) 40 - 150 U/L    AST 57 (H) 0 - 45 U/L    ALT 97 (H) 0 - 70 U/L    Protein Total 6.6 (L) 6.8 - 8.8 g/dL    Albumin 3.2 (L) 3.4 - 5.0 g/dL    Bilirubin Total 0.6 0.2 - 1.3 mg/dL    GFR Estimate 69 >60 mL/min/1.73m2   Magnesium    Collection Time: 12/09/21  9:14 AM   Result Value Ref Range    Magnesium 2.6 (H) 1.6 - 2.3 mg/dL   CBC with platelets and differential    Collection Time: 12/09/21  9:14 AM   Result Value Ref Range    WBC Count 6.2 4.0 - 11.0 10e3/uL    RBC Count 3.87 (L) 4.40 - 5.90 10e6/uL    Hemoglobin 10.6 (L) 13.3 - 17.7 g/dL    Hematocrit 33.7 (L) 40.0 - 53.0 %    MCV 87 78 - 100 fL    MCH 27.4 26.5 - 33.0 pg    MCHC 31.5 31.5 - 36.5 g/dL    RDW 15.9 (H) 10.0 - 15.0 %    Platelet Count 185 150 - 450 10e3/uL

## 2021-12-09 NOTE — PROGRESS NOTES
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    BACKGROUND                                                      Patient is working  closely with the Oncology/Palliative  team     ASSESSMENT      Enrollment  Primary Care Care Coordination Status: Enrolled  Outreach Frequency: 2 weeks    Discharge Assessment  How are you doing now that you are home?: Patient continues to have a white tongue from thrush Patient had an ENT visit today and prescrivbed Difulcan and Nystatin.  Pain is decreased which means the chemotherapy is working  Patient appreciated CC RN call and requests continued follow up calls  How are your symptoms? (Red Flag symptoms escalate to triage hotline per guidelines): Improved  Do you feel your condition is stable enough to be safe at home until your provider visit?: Yes  Does the patient have their discharge instructions? : Yes  Does the patient have questions regarding their discharge instructions? : No  Were you started on any new medications or were there changes to any of your previous medications? : Yes  Does the patient have all of their medications?: Yes  Do you have questions regarding any of your medications? : No  Do you have all of your needed medical supplies or equipment (DME)?  (i.e. oxygen tank, CPAP, cane, etc.): Yes  Discharge follow-up appointment scheduled within 14 calendar days? : Yes  Discharge Follow Up Appointment Date: 12/15/21  Discharge Follow Up Appointment Scheduled with?:  Specialty Care Provider         Post-op (Clinicians Only)  Did the patient have surgery or a procedure: No  Bowel Function: normal  Urinary Status: voiding without complaint/concerns    Care Management       Care Mgmt General Assessment  Referral  Referral Source: ED Follow-Up  Health Care Home/Utilization  Preferred Hospital: [facility]  [phone]  Preferred Urgent Care: [facility], [phone]  Living Situation  Current living arrangement:: I live in a private home with spouse  Resources  Patient receiving home care services:: No  Community Resources: OP Infusion  Supplies used at home:: None  Equipment Currently Used at Home: none  Referrals Placed: None  Employment Status: employed full-time  Psychosocial  Holiness or spiritual beliefs that impact treatment:: No  Mental health DX:: No  Mental health management concern (GOAL):: No  Chemical Dependency Status: No Current Concerns  Informal Support system:: Spouse  Functional Status  Dependent ADLs:: Independent  Dependent IADLs:: Independent  Bed or wheelchair confined:: No  Mobility Status: Independent  Advance Care Plan/Directive  Advanced Care Plans/Directives on file:: No and     Care Mgmt Encounter Assessment  Preventative Care  Routine Health maintenance Reviewed: Not assessed  Clinic Utilization  Difficulty keeping appointments:: No  Compliance Concerns: No  No-Show Concerns: No  No PCP office visit in Past Year: No  Transportation  Transportation means:: Family     Primary Diagnosis  Primary Diagnosis: Oncology  Barriers in Communication  Other concerns:: Glasses  Pain  Pain (GOAL):: No  Medication Review  Medication adherence problem (GOAL):: No  Knowledgeable about how to use meds:: Yes  Medication side effects suspected:: No  Diet/Exercise/Sleep  Diet:: Regular  Inadequate nutrition (GOAL):: No  Tube Feeding: No  Inadequate activity/exercise (GOAL):: No  Significant changes in  sleep pattern (GOAL): No    PLAN                                                      Outpatient Plan:    When to contact your care team     [facility] cancer clinic triage line at [phone] for temp > or = 100.4, uncontrolled nausea/vomiting/diarrhea/constipation, unrelieved pain, bleeding not relieved with pressure, dizziness, chest pain, shortness of breath, loss of consciousness, and any new or concerning symptoms.          Discharge Instructions     - Take Phosphorous 3x daily, if the dose needs to stop or be changed you will be notified  - Continue Zyprexa at bedtime until your nausea resolves  - If your potassium is low then the outpatient team will call you to start potassium     Follow this diet upon discharge: regular diet, drink plenty of fluids including water or powerade          CBC with Platelets & Differential     Twice weekly CBC         Future Appointments   Date Time Provider Department Center   12/9/2021  3:40 PM Sal Handy MD [facility]   12/13/2021  1:50 PM [facility]   12/13/2021  2:00 PM [place] CANCER INFUSION NURSE [facility] MARIA ELENA   12/15/2021  9:00 AM [facility]ios [facility]   12/15/2021  1:50 PM [facility]   12/15/2021  2:00 PM [facility]   12/16/2021  2:20 PM [facility] MARIA ELENA   12/16/2021  2:30 PM [place] CANC[place] INFUSION NURSE [facility] MARIA ELENA   12/20/2021  1:50 PM [facility]   12/20/2021  2:00 PM [facility] [facility]   12/23/2021  1:50 PM [facility]   12/23/2021  2:00 PM [place] CANCER INFUSION NURSE [facility] LAK   12/27/2021  1:50 PM [facility] LAK   12/27/2021  2:00 PM [place] [facility]   12/30/2021  1:50 PM [facility]   12/30/2021  2:00 PM [place] CAN[place] INFUSION NURSE [facility] MARIA ELENA   1/7/2022  8:30 AM Jamir  CHINTAN Seay [facility]   1/24/2022  8:30 AM Tasha Bowie SLP [facility]   2/14/2022 11:30 AM Tasha Bowie SLP [facility]   3/10/2022  1:00 PM Sherry Esquivel MD [facility]   5/16/2022  9:30 AM GINGER ORDAZ   5/16/2022 10:00 AM Jamir Grant MD [facility]       [facility]   Oliva García RN, Care Coordinator   [facility]   E-mail [email or web address]   [phone]

## 2021-12-09 NOTE — LETTER
2021       RE: Ifrah Huitron  [address] [place] [address]     Dear Colleague,    Thank you for referring your patient, Ifrah Huitron, to the [facility] [place] at [facility]. Please see a copy of my visit note below.        [facility]   at the [facility]   Otolaryngology Clinic     Patient: Ifrah Huitron    MRN: [id number]    : 1950    Age/Gender: 71 year old male  Date of Service: 2021  Rendering Provider:   Sherry Esquivel MD     Referring Provider   PCP: Sukhdev Kohler  Referring Physician: Kyree Bearden MD  [address] [place] [address]  Reason for Consultation   Left vocal fold paralysis   H/o MDL with left voice gel IL 21  H/o spindle cell carcinoma  History   HISTORY OF PRESENT ILLNESS: I was asked to consult on Ifrah Huitron, by Dr. Kyree Bearden for evaluation of left vocal fold paralysis. Mr. Huitron is a 71 year old male who presents to us today with dysphonia.      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    He presents today for evaluation. he reports:    Dysphonia  - Dr. Bearden thought there was a problem with his vocal fold  - was told there was an injury during the CT scan in 2021  - woke up one morning with laryngitis  - occurred this past summer  - his voice is squeaky  - high-pitched  - saw an SLP  - everyone he talks to says it has improved  - breath was escaping  - caused him to he dizzy and lightheaded  - breathing therapy helped him  - doesn't have this anymore  - in the last few months   - can talk much longer now without difficulty  - voice feels gravely now  - voice is not strong   - sound of his voice bothers him  - people who know him think his voice is clear  - seems to be getting better over time  - he thinks vocal fold injection didn't improve his voice  -  one month ago people thought that his voice was improving  - describes a sore throat always   - has a dry mouth at his palate  - 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  - used to have more trouble talking to clients due to breathing  - had his kids take over the business due to difficulty speaking due to the air  - would like his voice to be better    Dysphagia  - feels that food and drinks tcan go down the wrong tube  - occurs about 2-3x per week  - has always had this before treatments  - he drinks water while there is food in his mouth to help this go down  - swallows twice to help protect food from the wrong tube    Dyspnea  - no PNAs  - has improved a lot   - not significantly worse with exertion    Throat clearing/cough  - has an occasional coughing episode  - uses cough suppression medication once per week  - this improves his symptoms          PAST MEDICAL HISTORY:   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     Thyroid disease     removed pituitary gland     PAST SURGICAL HISTORY:   Past Surgical History:   Procedure Laterality Date     COLONOSCOPY N/A 12/17/2020    Procedure: COLONOSCOPY;  Surgeon: Ken Camacho MD;  Location: [facility]     ENT SURGERY       HERNIA REPAIR       LARYNGOSCOPY, EXCISE VOCAL CORD LESION MICROSCOPIC, COMBINED Left 07/01/2021    Procedure: MICROLARYNGOSCOPY, LEFT TRUE VOCAL CORD INJECTION WITH PROLARYN;  Surgeon: Kyree Bearden MD;  Location: [facility]     PHACOEMULSIFICATION WITH STANDARD INTRAOCULAR LENS IMPLANT Right 03/10/2021    Procedure: Cataract removal with implant.;  Surgeon: Jamir Mac MD;  Location: [facility]     PHACOEMULSIFICATION WITH STANDARD INTRAOCULAR LENS IMPLANT Left 04/05/2021    Procedure: Cataract removal with implant.;  Surgeon: Jamir Mac MD;  Location: [facility]     PICC DOUBLE LUMEN PLACEMENT Right 12/01/2021    5FR DL PICC, basilic vein.  "L-38cm, 1cm out.     PITUITARY EXCISION       tooth pulled 4/7  Right      CURRENT MEDICATIONS:   Current Outpatient Medications:      acetaminophen (TYLENOL) 500 MG tablet, Take 500-1,000 mg by mouth every 6 hours as needed for mild pain, Disp: , Rfl:      calcium carbonate (TUMS) 500 MG chewable tablet, Take 1 tablet (500 mg) by mouth 3 times daily as needed for heartburn, Disp: , Rfl:      celecoxib (CELEBREX) 200 MG capsule, Take 1 capsule (200 mg) by mouth 2 times daily. Note this is a new a strength! Only one capsule at a time!, Disp: 60 capsule, Rfl: 3     cholecalciferol (VITAMIN D-1000 MAX ST) 1000 units TABS, Take 1,000 Units by mouth daily , Disp: , Rfl:      fluconazole (DIFLUCAN) 200 MG tablet, Take 1 tablet (200 mg) by mouth daily, Disp: 7 tablet, Rfl: 0     guaiFENesin-codeine (GUAIFENESIN AC) 100-10 MG/5ML syrup, Take 10 mLs by mouth every 4 hours as needed for cough, Disp: 118 mL, Rfl: 0     hydrocortisone (CORTEF) 10 MG tablet, Take 20 mg in the morning and 10 mg in the afternoon, Disp: , Rfl:      Insulin Syringe-Needle U-100 27.5G X 5/8\" 2 ML MISC, 1 each daily as needed (with solucortef for adrenal crisis), Disp: 10 each, Rfl: 1     levothyroxine (SYNTHROID/LEVOTHROID) 75 MCG tablet, Take 75 mcg by mouth every morning , Disp: , Rfl:      LORazepam (ATIVAN) 0.5 MG tablet, Take 1 tablet (0.5 mg) by mouth every 4 hours as needed for nausea or vomiting . Caution: causes sedation., Disp: 30 tablet, Rfl: 0     Menthol-Methyl Salicylate (DALIA MALIK GREASELESS) cream, Apply topically every 6 hours as needed, Disp: , Rfl:      metroNIDAZOLE (METROGEL) 0.75 % external gel, Apply topically 2 times daily, Disp: 45 g, Rfl: 11     nystatin (MYCOSTATIN) 267860 UNIT/ML suspension, Take 5 mLs (500,000 Units) by mouth 4 times daily . Start at first signs of white coating on tongue., Disp: 473 mL, Rfl: 0     OLANZapine (ZYPREXA) 5 MG tablet, Take 1 tablet (5 mg) by mouth At Bedtime Continue until your nausea " resolves, Disp: 30 tablet, Rfl: 1     omeprazole (PRILOSEC) 20 MG DR capsule, Take 1 capsule (20 mg) by mouth 2 times daily, Disp: 60 capsule, Rfl: 1     ondansetron (ZOFRAN) 4 MG tablet, Take 1-2 tablets (4-8 mg) by mouth every 8 hours as needed for nausea, Disp: 40 tablet, Rfl: 1     phosphorus tablet 250 mg (PHOSPHA 250 NEUTRAL) 250 MG per tablet, Take phosphorous 3 times daily starting 12/9. We will follow your phosphorous levels twice weekly, and your outpatient providers will instruct you to adjust the dosing based on your phosphorous levels, Disp: 42 tablet, Rfl: 1     potassium chloride ER (K-TAB) 20 MEQ CR tablet, Take 1 tablet (20 mEq) by mouth daily as needed (If your outpatient providers notify you that your potassium is low) You will receive a call when to take potassium, Disp: 30 tablet, Rfl: 0     prochlorperazine (COMPAZINE) 5 MG tablet, Take 1 tablet (5 mg) by mouth every 6 hours as needed for nausea or vomiting . Caution: causes sedation., Disp: 30 tablet, Rfl: 1     triamcinolone (KENALOG) 0.1 % external cream, Apply topically 2 times daily , Disp: , Rfl:   No current facility-administered medications for this visit.    ALLERGIES: Penicillins    SOCIAL HISTORY:    Social History     Socioeconomic History     Marital status:      Spouse name: Not on file     Number of children: Not on file     Years of education: Not on file     Highest education level: Not on file   Occupational History     Not on file   Tobacco Use     Smoking status: Never Smoker     Smokeless tobacco: Never Used   Substance and Sexual Activity     Alcohol use: Yes     Comment: rare     Drug use: Never     Sexual activity: Not on file   Other Topics Concern     Parent/sibling w/ CABG, MI or angioplasty before 65F 55M? Not Asked   Social History Narrative     Not on file     Social Determinants of Health     Financial Resource Strain: Low Risk      Difficulty of Paying Living Expenses: Not very hard   Food Insecurity: No  Food Insecurity     Worried About Running Out of Food in the Last Year: Never true     Ran Out of Food in the Last Year: Never true   Transportation Needs: No Transportation Needs     Lack of Transportation (Medical): No     Lack of Transportation (Non-Medical): No   Physical Activity: Inactive     Days of Exercise per Week: 0 days     Minutes of Exercise per Session: 0 min   Stress: Not on file   Social Connections: Moderately Integrated     Frequency of Communication with Friends and Family: Twice a week     Frequency of Social Gatherings with Friends and Family: Twice a week     Attends Congregation Services: Never     Active Member of Clubs or Organizations: Yes     Attends Club or Organization Meetings: 1 to 4 times per year     Marital Status:    Intimate Partner Violence: Unknown     Fear of Current or Ex-Partner: No     Emotionally Abused: No     Physically Abused: Not on file     Sexually Abused: No   Housing Stability: Not on file       FAMILY HISTORY:   Family History   Problem Relation Age of Onset     Lupus Mother      ALS Father      Rheumatoid Arthritis Sister      Non-contributory for problems with anesthesia    REVIEW OF SYSTEMS:   The patient was asked a 14 point review of systems regarding constitutional symptoms, eye symptoms, ears, nose, mouth, throat symptoms, cardiovascular symptoms, respiratory symptoms, gastrointestinal symptoms, genitourinary symptoms, musculoskeletal symptoms, integumentary symptoms, neurological symptoms, psychiatric symptoms, endocrine symptoms, hematologic/lymphatic symptoms, and allergic/ immunologic symptoms.   The pertinent factors have been included in the HPI and below.  Patient Supplied Answers to Review of Systems  No flowsheet data found.    Physical Examination   The patient underwent a physical examination as described below. The pertinent positive and negative findings are summarized after the description of the examination.  Constitutional: The patient's  developmental and nutritional status was assessed. The patient's voice quality was assessed.  Head and Face: The head and face were inspected for deformities. The sinuses were palpated. The salivary glands were palpated. Facial muscle strength was assessed bilaterally.  Eyes: Extraocular movements and primary gaze alignment were assessed.  Ears, Nose, Mouth and Throat: The ears and nose were examined for deformities. The nasal septum, mucosa, and turbinates were inspected by anterior rhinoscopy. The lips, teeth, and gums were examined for abnormalities. The oral mucosa, tongue, palate, tonsils, lateral and posterior pharynx were inspected for the presence of asymmetry or mucosal lesions.    Neck: The tracheal position was noted, and the neck mass palpated to determine if there were any asymmetries, abnormal neck masses, thyromegally, or thyroid nodules.  Respiratory: The nature of the breathing and chest expansion/symmetry was observed.  Cardiovascular: The patient was examined to determine the presence of any edema or jugular venous distension.  Abdomen: The contour of the abdomen was noted.  Lymphatic: The patient was examined for infraclavicular lymphadenopathy.  Musculoskeletal: The patient was inspected for the presence of skeletal deformities.  Extremities: The extremities were examined for any clubbing or cyanosis.  Skin: The skin was examined for inflammatory or neoplastic conditions.  Neurologic: The patient's orientation, mood, and affect were noted. The cranial nerve  functions were examined.  Other pertinent positive and negative findings on physical examination:      OC/OP: no lesions, uvula midline, soft palate elevates symmetrically  Neck: no lesions, no TH tenderness to palpation    All other physical examination findings were within normal limits and noncontributory.  Procedures   Video Laryngoscopy with Stroboscopy (CPT 00757) and Behavioral & Qualitative Evaluation of Voice and Resonence      Preoperative Diagnosis:  Dysphonia and throat symptoms  Postoperative Diagnosis: Dysphonia and throat symptoms  Indication:  Patient has new or persistent dysphonia and throat symptoms.  This requires evaluation by stroboscopy to fully delineate the laryngeal functioning; especially mucosal wave assessment, ultrasharp visualization of lesions on the vocal folds, and overall functioning of the larynx.  Details of Procedure: A 70 degree rigid telescopic laryngoscope or a distal chip flexible scope was used. The lighting was obtained via a light cable connected to a stroboscopic unit. The telescope was inserted either transorally or transnasally until the vocal folds could be visualized. The patient was instructed to sustain the vowel  ee  at a comfortable pitch and loudness as the voice was monitored by a microphone connected to a fundamental frequency tracker. This circuit tracked vocal periodicity, allowing the light to flash in synchrony with the glottal cycles. A setting on the stroboscope was set to change the phase of light strobing with relation to the vocal fundamental frequency, producing an image of 1 to 2 glottal cycles every second. The video images were recorded for analysis. Use of the variable speed, slow and stop scan allowed careful study of pertinent segments of laryngeal function to increase accuracy of clinical assessments of function and dysfunction.  In particular, features of glottal closure, mucosal wave on the vocal fold cover and laryngeal symmetry were analyzed. Lastly, the patient was asked to phonate speech samples and auditory/perceptual evaluation of voice and resonance were performed.  The vocal quality was carefully evaluated for hoarseness, breathiness, loudness, phrase length and intelligibility to determine the source of dysphonia.    Findings:   A. LARYNGOVIDEOSTROBOSCOPY   Anatomic/Physiological Deviations:  Left vocal fold paralysis, bilateral anterior saccular cysts  Mucosal  wave: Right:  No restriction     Left: No restriction  Bilateral Vocal Fold Vibration: Asymmetric  Vocal Process: Right: No restriction    Left:  Marked restriction  Vocal Fold closure: Complete glottal closure    Complication(s): None  Disposition: Patient tolerated the procedure well                    Fiberoptic Endoscopic Evaluation of Swallowing (CPT 64381)  and Interpretation of Swallowing (CPT 81607)    Indications: See above notes for complete history and physical. Patient complains of dysphagia to both solids and liquids and/or there is suggestion on history and endoscopic exam of the presence of dysphagia causing medical complaints.  Swallowing evaluation is being performed to assess the presence and degree of dysphagia, and to recommend a safe diet.     Pulmonary Status:  No PNA   Current Diet:              regular                                        thin liquids      Consistency Amounts:  Thin Liquid: sip   Puree: 1 tsp  Solid: cookies            Positioning: upright in a chair  Oral Peripheral Exam: See physical exam section.  Anatomic Notes: See Videostroboscopy report for assessment of anatomy and laryngeal functioning  Pharyngeal secretions prior to administration of liquid or food: No  Oral Phase Abnormal Findings: No abnormal behavior observed  Behavioral Adaptations: No abnormal behavior observed and Repeat dry swallows  Pharyngeal Phase Abnormal Findings: penetration with large sips of thins, left sided vallecular residue improves with liquid wash    Recommended Diet:  regular                                        thin liquids              Review of Relevant Clinical Data   I personally reviewed:  Notes:   Op Kyree Stevenson 7/1/21  Dedo exposure, 1mL of voice gel injected    Dr. Kyree Bearden, 6/21/21     Radiology:     CT CHEST/ABDOMEN/PELVIS W CONTRAST 11/22/2021   mediastinal mass          Pathology: CT guided biopsy of anterior mediastinal mass 5/20/21  SPECIMEN(S):   Left pleural mass  buopsy, CT guided     FINAL DIAGNOSIS:   Left pleural mass biopsy, CT guided:   - Malignant spindle cell neoplasm with necrosis (see comment)     COMMENT:   Special stains were performed with appropriate controls.  The tumor cells   are diffusely positive for CK   AE1/AE3 and CK MIRZA.  There is also focal to patchy staining for D2-40,   CD68, and WT-1.  INI1 is retained in   the tumor cells.  The Ki-67 labeling index is approximately 70%.  The   tumor cells show high PD-L1 expression   (TPS 90%).  No expression of P63, calretinin, DOG1, ALK1, , CK 5/6,   STAT6, SMA, SALL4, desmin, S100,   Myogenin, CD21, and CD23 is identified in the tumor cells.  These findings    together with tumor morphology and   location are most compatible with malignant sarcomatoid mesothelioma.  The    differential diagnosis includes   sarcomatoid carcinoma and sarcomas such as synovial sarcoma and   histiocytic sarcoma.  Additional tests are   pending and may help to classify this tumor.     No fungal or mycobacterial organisms are identified with GMS and Jessenia   stains.      Labs:  Lab Results   Component Value Date    TSH 0.74 10/04/2021     Lab Results   Component Value Date     12/09/2021    CO2 23 12/09/2021    BUN 21 12/09/2021    CREAT 0.7 06/21/2021    PHOS 2.4 (L) 12/09/2021     Lab Results   Component Value Date    WBC 6.2 12/09/2021    HGB 10.6 (L) 12/09/2021    HCT 33.7 (L) 12/09/2021    MCV 87 12/09/2021     12/09/2021     Lab Results   Component Value Date    INR 1.14 12/01/2021     No results found for: OLAF  No components found for: RHEUMATOIDFACTOR,  RF  Lab Results   Component Value Date    CRP 26.9 (H) 11/08/2021     No components found for: CKTOT, URICACID  No components found for: C3, C4, DSDNAAB, NDNAABIFA  No results found for: MPOAB    Patient reported Quality of Life (QOL) Measures   Patient Supplied Answers To VHI Questionnaire  No flowsheet data found.    Patient Supplied Answers To EAT  Questionnaire  No flowsheet data found.    Patient Supplied Answers To CSI Questionnaire  No flowsheet data found.    Patient Supplied Answers to Dyspnea Index Questionnaire:  No flowsheet data found.    Impression & Plan     IMPRESSION: Mr. Huitron is a 71 year old male who is being seen for the followin. Dysphonia  - in the setting spindle cell carcinoma, with mediastinal extent undergoing chemotherapy  - woke up one morning with laryngitis  - he reports this started around March   - but he also had a CT-guided lung biopsy on 2021 which he reports thinks affected his voice and is around the time when his voice changed  - voice demand is low now   - used to have more trouble talking to clients due to breathing, but this has improved in the past month  - had therapy which improved his breathing  - s/p MDL with voice gel on 21 by Dr. Bearden - though he reports no benefit in his voice after that  - however in the past months the voice has improved  - he can talk now with people without needing to catch his breathe   - however he is still bothered by the gravel sound in his voice  - MPT is 19s  - strobe 21 shows left vocal fold paralysis, bilateral anterior saccular cysts  - symptoms due to vocal fold paralysis and secondary muscle tension  - he does have good closure at this point, therefore would recommend optimization with voice therapy at this point  - will follow up in March when he is at 1 year from initial symptoms to determine if he is still symptomatic and would like further treatment at that point   Plan  - voice therapy       2. Dysphagia  - feels that food and drinks can go down the wrong tube  - occurs about 2-3x per week  - has always had difficulties with this before radiation and chemotherapy treatments  - FEES 21 shows penetration with large sips of thins, left sided vallecular residue improves with liquid wash  - symptoms due to pharyngeal weakness  - discussed his precautions  - small bites and liquid wash do help therefore will keep focusing on thsi  - would encourage peanut butter, eggs and ensure/boost for protein rather than meats and seeds  Plan  - swallow precautions  - if worsening in swallow - will need repeat check     RETURN VISIT: 3 months      Scribe Disclosure:  I, Katey Ram, am serving as a scribe to document services personally performed by Sherry Esquivel MD at this visit, based upon the provider's statements to me. All documentation has been reviewed by the aforementioned provider prior to being entered into the official medical record.     Thank you for the kind referral and for allowing me to share in the care of Mr. Huitron. If you have any questions, please do not hesitate to contact me.    Sherry Esquivel MD    Laryngology    [facility]  Department of  Otolaryngology - Head and Neck Surgery  Clinics [place] [address]  Appointment line: [phone]  Fax: [phone]  [email or web address]          Again, thank you for allowing me to participate in the care of your patient.      Sincerely,    Sherry Esquivel MD

## 2021-12-09 NOTE — PROGRESS NOTES
Infusion Nursing Note:  Ifrah Huitron presents today for neulasta injection.    Patient seen by provider today: No   present during visit today: Not Applicable.    Note: Pt arrives after being discharged from the hospital yesterday. He notes bilateral swelling in his lower extremities this morning. He denies any pain with the swelling and there is no redness visualized. He also started using his nystatin for thrush that returned yesterday. He is requesting a tablet he can take if the nystatin doesn't work, he previously had to start a medication after the nystatin didn't improve his thrush after two days.    Patient receiving neulasta for the first time, potential side effects reviewed as well as using claritin to help with bone pain.    TORB: Veronique Rogers RN/Maynor Rios PA-C  -pt to keep his legs elevated and to wear compression stockings. Pt should call the clinic if swelling doesn't decrease in a few days or if it becomes painful and an ultrasound will be ordered. Swelling most likely due to extra fluids being in patient.  -Fluconazole prescribed and sent to his local pharmacy for thrush if needed.  -aware of lab results, no changes needed, will have them redraw on Monday as scheduled. Pt is taking phosphorus already three times a day and numbers are trending up.      Treatment Conditions:  Lab Results   Component Value Date    HGB 10.6 (L) 12/09/2021    WBC 6.2 12/09/2021    ANEU 4.7 12/09/2021    ANEUTAUTO 4.5 12/07/2021     12/09/2021      Lab Results   Component Value Date     12/09/2021    POTASSIUM 3.4 12/09/2021    MAG 2.6 (H) 12/09/2021    CR 1.06 12/09/2021    COTY 9.5 12/09/2021    BILITOTAL 0.6 12/09/2021    ALBUMIN 3.2 (L) 12/09/2021    ALT 97 (H) 12/09/2021    AST 57 (H) 12/09/2021         Post Infusion Assessment:  Patient tolerated injection into left upper abdomen without incident.  Site patent and intact, free from redness, edema or discomfort.  No evidence of  extravasations.  Access discontinued per protocol.       Discharge Plan:   Prescription refills given for fluconazole.  Discharge instructions reviewed with: Patient.  Patient and/or family verbalized understanding of discharge instructions and all questions answered.  Copy of AVS reviewed with patient and/or family.  Patient will return 12/13/21 for next appointment.  Patient discharged in stable condition accompanied by: self.  Departure Mode: Ambulatory.      Veronique Rogers RN

## 2021-12-13 ENCOUNTER — APPOINTMENT (OUTPATIENT)
Dept: LAB | Facility: CLINIC | Age: 71
End: 2021-12-13
Payer: MEDICARE

## 2021-12-13 ENCOUNTER — INFUSION THERAPY VISIT (OUTPATIENT)
Dept: INFUSION THERAPY | Facility: CLINIC | Age: 71
End: 2021-12-13
Attending: PHYSICIAN ASSISTANT
Payer: MEDICARE

## 2021-12-13 ENCOUNTER — DOCUMENTATION ONLY (OUTPATIENT)
Dept: ONCOLOGY | Facility: CLINIC | Age: 71
End: 2021-12-13

## 2021-12-13 VITALS
DIASTOLIC BLOOD PRESSURE: 67 MMHG | TEMPERATURE: 97.5 F | HEART RATE: 87 BPM | SYSTOLIC BLOOD PRESSURE: 156 MMHG | RESPIRATION RATE: 20 BRPM

## 2021-12-13 DIAGNOSIS — C49.9 SARCOMA (H): Primary | ICD-10-CM

## 2021-12-13 DIAGNOSIS — R11.0 CHEMOTHERAPY-INDUCED NAUSEA: ICD-10-CM

## 2021-12-13 DIAGNOSIS — T45.1X5A CHEMOTHERAPY-INDUCED NAUSEA: ICD-10-CM

## 2021-12-13 LAB
ALBUMIN SERPL-MCNC: 3.2 G/DL (ref 3.4–5)
ALP SERPL-CCNC: 325 U/L (ref 40–150)
ALT SERPL W P-5'-P-CCNC: 56 U/L (ref 0–70)
ANION GAP SERPL CALCULATED.3IONS-SCNC: 6 MMOL/L (ref 3–14)
AST SERPL W P-5'-P-CCNC: 27 U/L (ref 0–45)
BILIRUB SERPL-MCNC: 0.7 MG/DL (ref 0.2–1.3)
BUN SERPL-MCNC: 17 MG/DL (ref 7–30)
CALCIUM SERPL-MCNC: 9 MG/DL (ref 8.5–10.1)
CHLORIDE BLD-SCNC: 112 MMOL/L (ref 94–109)
CO2 SERPL-SCNC: 27 MMOL/L (ref 20–32)
CREAT SERPL-MCNC: 0.91 MG/DL (ref 0.66–1.25)
ERYTHROCYTE [DISTWIDTH] IN BLOOD BY AUTOMATED COUNT: 15.3 % (ref 10–15)
GFR SERPL CREATININE-BSD FRML MDRD: 83 ML/MIN/1.73M2
GLUCOSE BLD-MCNC: 96 MG/DL (ref 70–99)
HCT VFR BLD AUTO: 32.2 % (ref 40–53)
HGB BLD-MCNC: 10.2 G/DL (ref 13.3–17.7)
MCH RBC QN AUTO: 27.5 PG (ref 26.5–33)
MCHC RBC AUTO-ENTMCNC: 31.7 G/DL (ref 31.5–36.5)
MCV RBC AUTO: 87 FL (ref 78–100)
PHOSPHATE SERPL-MCNC: 2.2 MG/DL (ref 2.5–4.5)
PLATELET # BLD AUTO: 94 10E3/UL (ref 150–450)
POTASSIUM BLD-SCNC: 3.3 MMOL/L (ref 3.4–5.3)
PROT SERPL-MCNC: 6.7 G/DL (ref 6.8–8.8)
RBC # BLD AUTO: 3.71 10E6/UL (ref 4.4–5.9)
SODIUM SERPL-SCNC: 145 MMOL/L (ref 133–144)
WBC # BLD AUTO: 1.4 10E3/UL (ref 4–11)

## 2021-12-13 PROCEDURE — 85027 COMPLETE CBC AUTOMATED: CPT

## 2021-12-13 PROCEDURE — 84100 ASSAY OF PHOSPHORUS: CPT | Performed by: PHYSICIAN ASSISTANT

## 2021-12-13 PROCEDURE — 96361 HYDRATE IV INFUSION ADD-ON: CPT

## 2021-12-13 PROCEDURE — 80053 COMPREHEN METABOLIC PANEL: CPT | Performed by: PHYSICIAN ASSISTANT

## 2021-12-13 PROCEDURE — 96360 HYDRATION IV INFUSION INIT: CPT

## 2021-12-13 PROCEDURE — 258N000003 HC RX IP 258 OP 636: Performed by: PHYSICIAN ASSISTANT

## 2021-12-13 PROCEDURE — 36415 COLL VENOUS BLD VENIPUNCTURE: CPT

## 2021-12-13 RX ADMIN — SODIUM CHLORIDE 1000 ML: 9 INJECTION, SOLUTION INTRAVENOUS at 14:40

## 2021-12-13 NOTE — PROGRESS NOTES
Lab called to say that patient's ANC is 0.4.  Patient was in the infusion room today.  He received IV fluid.  Vitals did not reveal any fever.  He will follow up with his primary oncologist.

## 2021-12-13 NOTE — PROGRESS NOTES
Infusion Nursing Note:  Ifrah Huitron presents today for IVF.    Patient seen by provider today: No   present during visit today: Not Applicable.    Note: N/A.      Intravenous Access:  Peripheral IV placed.    Treatment Conditions:  Not Applicable.      Post Infusion Assessment:  Patient tolerated infusion without incident.  Blood return noted pre and post infusion.  Site patent and intact, free from redness, edema or discomfort.  No evidence of extravasations.  Access discontinued per protocol.       Discharge Plan:   Patient and/or family verbalized understanding of discharge instructions and all questions answered.  Patient discharged in stable condition accompanied by: self.  Departure Mode: Ambulatory.      Sabrina Castro RN

## 2021-12-14 LAB
BASOPHILS # BLD MANUAL: 0 10E3/UL (ref 0–0.2)
BASOPHILS NFR BLD MANUAL: 0 %
EOSINOPHIL # BLD MANUAL: 0.1 10E3/UL (ref 0–0.7)
EOSINOPHIL NFR BLD MANUAL: 4 %
LYMPHOCYTES # BLD MANUAL: 0.9 10E3/UL (ref 0.8–5.3)
LYMPHOCYTES NFR BLD MANUAL: 64 %
MONOCYTES # BLD MANUAL: 0.1 10E3/UL (ref 0–1.3)
MONOCYTES NFR BLD MANUAL: 6 %
NEUTROPHILS # BLD MANUAL: 0.4 10E3/UL (ref 1.6–8.3)
NEUTROPHILS NFR BLD MANUAL: 26 %
PATH REV: ABNORMAL
PLAT MORPH BLD: ABNORMAL
RBC MORPH BLD: ABNORMAL

## 2021-12-14 NOTE — PROGRESS NOTES
Outpatient Speech Language Therapy Evaluation  PLAN OF TREATMENT FOR OUTPATIENT REHABILITATION  (COMPLETE FOR INITIAL CLAIMS ONLY)  Patient's Last Name, First Name, M.I.  YOB: 1950  Ifrah Huitron                        Provider's Name  Tasha Bowie, SLP Medical Record No.  [id number]                               Onset Date:  12/09/21   Start of Care Date: 12/09/21     Type: Speech Language Therapy Medical Diagnosis: Dysphonia                        Therapy Diagnosis:  Dysphonia.   Visits from SOC:  1   _________________________________________________________________________________  Plan of Treatment:   Speech therapy    DURATION/FREQUENCY OF TREATMENT  Four weekly or bi-weekly one-hour sessions, with four monthly one-hour follow-up sessions, as well as follow-up sessions along with Dr. Esquivel    GOALS  Patient goal:    To understand the problem and fix it as much as possible  To have a normal and acceptable voice quality    Long-term goal(s): In 9 months, Mr. Huitron will:  1.  Report a week of typical vocal activities, in which dysphonia and vocal effort do not exceed a level of 2 out of 10, 80% of the time   2.  In a 20-minute conversation task, demonstrate roughness and asthenia that do not exceed a level of 3 out of 10, 80% of the time, by therapist judgment    _________________________________________________________________________________    I CERTIFY THE NEED FOR THESE SERVICES FURNISHED UNDER        THIS PLAN OF TREATMENT AND WHILE UNDER MY CARE     (Physician attestation of this document indicates review and certification of the therapy plan).     Certification date from: 12/09/21  Certification date to: 3/9/22    Referring Provider: Sherry Esquivel MD

## 2021-12-14 NOTE — PROGRESS NOTES
"[facility]  Baldomero Boswell Jr., M.D., F.A.C.S.  Veronique Forbes M.D., M.P.H.  Sherry Esquivel M.D.  Tasha Bowie, Ph.D., CCC-SLP  Neil Smith, Ph.D., CCC-SLP  Janie Madrigal M.M. (voice), M.A., CCC-SLP  Danilo Lu M.M. (voice), MLILIA., CCC-SLP  CHERELLE Kraft (voice), M.S., CCC-SLP    [facility]  VOICE/SPEECH/BREATHING EVALUATION AND LARYNGEAL EXAMINATION REPORT    Patient: Ifrah Huitron  Date of Visit: 12/9/2021    Clinician: Tasha Bowie, Ph.D., CCC/SLP  Seen in conjunction with: Dr. Sherry Esquivel    CHIEF COMPLAINT:  voice    HISTORY  Ifrah Huitron is a 71 year old presenting today for evaluation of dysphonia.    Please refer to Dr. Esquivel s dictation for a more complete history and impressions.   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 Bearden in July, no improvement    Apparently Dr. Bearden stated \"it didn't take\"    Did some speech therapy with SLP Jemima Mantilla at [facility]; unclear if this was helpful    Voice has been improving in the past month or so    He thinks he still sounds \"funny\" and voice feels \"leaky\"(resulting in dizziness) but others comment on the improvement    Also contant dry sore throat, occasional cough episodes, and occasional aspiration    previous breathing problems have subsided    DIAGNOSIS/REASON FOR REFERRAL  Dysphonia/ Evaluate, perform laryngeal exam, treat as appropriate    CURRENT SYMPTOMS INCLUDE:  VOICE    Primary concern, despite unremarkable voice use  COUGH/THROAT CLEARING    Occasional; not a particular problem  SWALLOWING    Aware of aspiration several times per week  ADDITIONAL    Dry sore throat    Breathing problems have improved    OTHER PERTINENT HISTORY    Complex medical Hx; please see Dr. Esquivel's Andrei's note    OBJECTIVE FINDINGS  VOICE/ SPEECH/ NON-COMMUNICATIVE LARYNGEAL BEHAVIORS " EVALUATION  An evaluation of the voice was accomplished today; salient features are as follows:    Left facial hemiparalysis; he states 20 year Hx with unknown etiology:    Cough/ Throat clear:    Not observed    Breathing pattern:    Appears within normal limits and adequate     No overt tension is evident.    Voice quality is characterized by    Slight roughness iw th occasional slight diplophonia; t his improves as he continues talking    Mildly asthenic at first; this also improves somewhat    No apparent use of techniques (e.g. taking adequate breath before talking); he states the techniques came naturally    Generally fairly clear quality; within cultural normal limits    Habitual pitch is WNL and appropriate, at around 125 Hz    Loudness is adequate for the setting; not fully tested    Sustained vowels:     Comfortable pitch /i/: 125 Hz; starts rough, progresses to diplophonia, then marked glottal [place]    Maximum phonation time is 19 seconds; sustained vowel is rough, becoming markedly so    In a pitch glide task to determine pitch range, he demonstrates essentially normal range, but poor skill for the task    Highest pitch: 288 Hz; upper register adjustment    Lowest pitch:  90 Hz    CAPE-V Overall Severity:   25/100    LARYNGEAL EXAMINATION    Endoscopic laryngeal examination was performed by:  Dr. Esquivel  I provided the protocol of instructions for the patient.  Type of exam:   Flexible endoscopy with chip-tip technology and stroboscopy,    This exam shows:    Laryngeal and Vocal Fold Mucosa    Essentially healthy laryngeal mucosa    Presence of secretions is unremarkable    Status of vocal fold mucosa:   o Mildly dry  o Otherwise within normal limits, with no lesions and straight vibratory margins    Neurological and Functional Integrity of the Larynx    Left vocal fold is immobile near the midline  o Also slightly bowed, especially during conversational speech    Right vocal fold comes to midline to provide  weak glottic closure  o Closure is especially weak at the midfold    Airway is adequate    Left arytenoid position is droopy, often obscuring the view of the vocal fold    Weakness of movement is evident during a task of 20 quickly repeated vowels    Elongation of the vocal folds for pitch increase is somewhat limited; poor skill for the task    Left vocal fold sometimes appears to be at a slightly lower vertical level during phonatory tasks    Supraglottic Function and Therapy Probes    Moderate anterior-posterior constriction of the supraglottic larynx during connected speech    Variable moderate to marked ventricular approximation during phonation;   o There is also some redundant tissue of the ventricular folds anteriorly; this many sometimes result in an additional source of vibration noise    Stroboscopy provided the following additional information:    Stroboscopy shows a normal mucosal wave at habitual pitch, but weak glottic closure and out-of-phase mucosal wave at higher and lower pitch    Stroboscopy was limited due to lack of entrainment, but brief glimpses show    Stroboscopy was accomplished but view of vibratory characteristics was somewhat obscured by supraglottic structures; limited view shows    Glottic closure is weak at the midfold; open phase predominates, and the right mucosal wave is floppy and sometimes asymmetrical    Mucosal wave can be WNL at some pitch/volume levels    Dr. Esquivel and I reviewed this laryngeal exam with Mr. Huitron today, and I provided pertinent explanations:    Endoscopic findings are consistent with audio-perceptual assessment and patient Hx/complaints.    his symptoms are accounted for by the left vocal fold immobility with weak glottic closure and compensatory supraglottic hyperfunction    Because he seems to be improving currently, we agreed to hold off on any medical intervention, and try to optimize his voice with speech therapy in this facility     ASSESSMENT /  PLAN  IMPRESSIONS:  This evaluation has resulted in the following diagnosis/diagnoses for Mr. 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    He demonstrates a Good prognosis for improvement given adherence to therapeutic recommendations. Therapeutic     Positive indicators: commitment to process; diagnosis is known to respond to functional treatment;     Negative indicators: none    However, prognosis for complete resolution of dysphonia is guarded, as there is limitation due to left vocal fold paralysis      Research: This patient was not approached about participation in research.      TREATMENT PLAN  Speech therapy    DURATION/FREQUENCY OF TREATMENT  Four weekly or bi-weekly one-hour sessions, with four monthly one-hour follow-up sessions, as well as follow-up sessions along with Dr. Esquivel    GOALS  Patient goal:    To understand the problem and fix it as much as possible  To have a normal and acceptable voice quality    Long-term goal(s): In 9 months, Mr. Huitron will:  1.  Report a week of typical vocal activities, in which dysphonia and vocal effort do not exceed a level of 2 out of 10, 80% of the time   2.  In a 20-minute conversation task, demonstrate roughness and asthenia that do not exceed a level of 3 out of 10, 80% of the time, by therapist judgment    Certification period: December 9, 2021 - March 9, 2022    This treatment plan was developed with the patient who agreed with the recommendations.      TOTAL SERVICE TIME: 45 minutes  EVALUATION OF VOICE AND RESONANCE (45723)  NO CHARGE FACILITY FEE       Tasha D.  Jamir, Ph.D., [facility]-SLP  Speech-Language Pathologist  Director, [facility]  [phone]

## 2021-12-14 NOTE — PROGRESS NOTES
Ifrah is a 71 year old who is being evaluated via a billable video visit.      How would you like to obtain your AVS? MyChart  If the video visit is dropped, the invitation should be resent by: Text to cell phone: [phone]  Will anyone else be joining your video visit? Emma Claros    Video Start Time: 9:00am    Video-Visit Details    Type of service:  Video Visit    Video End Time:9:30am    Originating Location (pt. Location): Home    Distant Location (provider location):  [facility]     Platform used for Video Visit: [facility]     Oncology/Hematology Visit Note  Dec 15, 2021    Reason for Visit: Follow up of spindle cell sarcoma     History of Present Illness: Ifrah Huitron is a 71 year old male with PMH rosacea, pituitary macroadenoma s/p resection, GERD, kidney stones, DJD with metastatic spindle cell sarcoma. He presented to his PCP March 2021 with chest pain/left shoulder and back pain that led to imaging which revealed multiple lung mets. There were difficulties in getting diagnostic biopsy, eventually biopsy of mediastinal mass with spindle cell sarcoma. There was high PD-L1 expression (90%). Baseline imaging 6/21/21 with progression of lung mets and left-sided subdiaphragmatic mass, stable liver lesions. 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      He started Keytruda 6/21/21. CT 8/2/21 with positive response to treatment. CT 10/18/21 with mixed response- LUQ mass larger, anterior mediastinal and left anterior pleural mass smaller. Keytruda stopped.     Started on Doxil + Ifosfamide 10/26/21. Poorly tolerated with mucositis, nausea, poor oral intake. CT with stable to positive response.     Received C2 12/1/21 (delayed for tolerance issues) with 10% dose reduction.    He was called today for toxicity check.     Interval History:  Ifrah was called today for follow-up. He overall is  "doing OK. Earlier this week he had a sore mouth, trouble swallowing, and poor taste that limited his oral intake.This is all better yesterday and today. He is having some gas pains and started gas-x. Bowels normal. His thrush is improving. Nausea has been well controlled doing Zyprexa at night and PRN Zofran during the day plus omeprazole.     He denies fevers. Has had some migraines but manageable. Some dizziness with standing but brief, IVF have helped. No chest pain and cancer pain is minimal. No SOB at rest, does have trouble with stairs. Cough minimal. No urinary concerns other than dark urine from mild dehydration. Has slight swelling at sock line. No rashes. Occasional neuropathy but not bothersome. Less jerky movements. No bleeding issues. Energy levels good today.     Current Outpatient Medications   Medication Sig Dispense Refill     acetaminophen (TYLENOL) 500 MG tablet Take 500-1,000 mg by mouth every 6 hours as needed for mild pain       calcium carbonate (TUMS) 500 MG chewable tablet Take 1 tablet (500 mg) by mouth 3 times daily as needed for heartburn       celecoxib (CELEBREX) 200 MG capsule Take 1 capsule (200 mg) by mouth 2 times daily. Note this is a new a strength! Only one capsule at a time! 60 capsule 3     cholecalciferol (VITAMIN D-1000 MAX ST) 1000 units TABS Take 1,000 Units by mouth daily        doxepin (SINEQUAN) 10 MG/ML (HIGH CONC) solution Take 2 mLs (20 mg) by mouth every 4 hours as needed for other (mouth pain) . Mix with equal parts tap water. Swish and hold in mouth ~60 seconds then spit out. 118 mL 3     fluconazole (DIFLUCAN) 200 MG tablet Take 1 tablet (200 mg) by mouth daily 7 tablet 0     guaiFENesin-codeine (GUAIFENESIN AC) 100-10 MG/5ML syrup Take 10 mLs by mouth every 4 hours as needed for cough 118 mL 0     hydrocortisone (CORTEF) 10 MG tablet Take 20 mg in the morning and 10 mg in the afternoon       Insulin Syringe-Needle U-100 27.5G X 5/8\" 2 ML MISC 1 each daily as " needed (with solucortef for adrenal crisis) 10 each 1     levothyroxine (SYNTHROID/LEVOTHROID) 75 MCG tablet Take 75 mcg by mouth every morning        LORazepam (ATIVAN) 0.5 MG tablet Take 1 tablet (0.5 mg) by mouth every 4 hours as needed for nausea or vomiting . Caution: causes sedation. 30 tablet 0     Menthol-Methyl Salicylate (DALIA MALIK GREASELESS) cream Apply topically every 6 hours as needed       metroNIDAZOLE (METROGEL) 0.75 % external gel Apply topically 2 times daily 45 g 11     nystatin (MYCOSTATIN) 666555 UNIT/ML suspension Take 5 mLs (500,000 Units) by mouth 4 times daily . Start at first signs of white coating on tongue. 473 mL 0     OLANZapine (ZYPREXA) 5 MG tablet Take 1 tablet (5 mg) by mouth At Bedtime Continue until your nausea resolves 30 tablet 1     omeprazole (PRILOSEC) 20 MG DR capsule Take 1 capsule (20 mg) by mouth 2 times daily 60 capsule 1     ondansetron (ZOFRAN) 4 MG tablet Take 1-2 tablets (4-8 mg) by mouth every 8 hours as needed for nausea 40 tablet 1     phosphorus tablet 250 mg (PHOSPHA 250 NEUTRAL) 250 MG per tablet Take phosphorous 3 times daily starting 12/9. We will follow your phosphorous levels twice weekly, and your outpatient providers will instruct you to adjust the dosing based on your phosphorous levels 42 tablet 1     potassium chloride ER (K-TAB) 20 MEQ CR tablet Take 1 tablet (20 mEq) by mouth daily as needed (If your outpatient providers notify you that your potassium is low) You will receive a call when to take potassium 30 tablet 0     prochlorperazine (COMPAZINE) 5 MG tablet Take 1 tablet (5 mg) by mouth every 6 hours as needed for nausea or vomiting . Caution: causes sedation. 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     Thyroid disease     removed  pituitary gland     Past Surgical History:   Procedure Laterality Date     COLONOSCOPY N/A 12/17/2020    Procedure: COLONOSCOPY;  Surgeon: Ken Camacho MD;  Location: [facility]     ENT SURGERY       HERNIA REPAIR       LARYNGOSCOPY, EXCISE VOCAL CORD LESION MICROSCOPIC, COMBINED Left 07/01/2021    Procedure: MICROLARYNGOSCOPY, LEFT TRUE VOCAL CORD INJECTION WITH PROLARYN;  Surgeon: Kyree Bearden MD;  Location: [facility]     PHACOEMULSIFICATION WITH STANDARD INTRAOCULAR LENS IMPLANT Right 03/10/2021    Procedure: Cataract removal with implant.;  Surgeon: Jamir Mac MD;  Location: [facility]     PHACOEMULSIFICATION WITH STANDARD INTRAOCULAR LENS IMPLANT Left 04/05/2021    Procedure: Cataract removal with implant.;  Surgeon: Jamir Mac MD;  Location: [facility]     PICC DOUBLE LUMEN PLACEMENT Right 12/01/2021    5FR DL PICC, basilic vein. L-38cm, 1cm out.     PITUITARY EXCISION       tooth pulled 4/7  Right      Allergies   Allergen Reactions     Penicillins Hives and Swelling            Social History   Social History     Tobacco Use     Smoking status: Never Smoker     Smokeless tobacco: Never Used   Substance Use Topics     Alcohol use: Yes     Comment: rare     Drug use: Never      Past medical history and social history were reviewed.    Physical Examination:  There were no vitals taken for this visit.  Wt Readings from Last 10 Encounters:   12/09/21 66.7 kg (147 lb)   12/09/21 67 kg (147 lb 11.2 oz)   12/08/21 64.8 kg (142 lb 13.7 oz)   11/12/21 68 kg (150 lb)   11/11/21 69.9 kg (154 lb)   11/04/21 70 kg (154 lb 6.4 oz)   11/02/21 85.9 kg (189 lb 6.4 oz)   09/17/21 73 kg (161 lb)   09/01/21 72.6 kg (160 lb)   08/11/21 72.6 kg (160 lb)     Video physical exam  General: Patient appears well in no acute distress.   Skin: No visualized rash or lesions on visualized skin  Eyes: EOMI, no erythema, sclera icterus or discharge noted  Resp: Appears to be breathing comfortably without accessory muscle usage,  speaking in full sentences, no cough  MSK: Appears to have normal range of motion based on visualized movements  Neurologic: No apparent tremors, facial movements symmetric  Psych: affect normal, alert and oriented    The rest of a comprehensive physical examination is deferred due to PHE (public health emergency) video restrictions    Laboratory Data:   Results for MARISOL XIE (MRN [id number]) as of 12/15/2021 09:42   12/13/2021 14:01   Sodium 145 (H)   Potassium 3.3 (L)   Chloride 112 (H)   Carbon Dioxide 27   Urea Nitrogen 17   Creatinine 0.91   GFR Estimate 83   Calcium 9.0   Anion Gap 6   Phosphorus 2.2 (L)   Albumin 3.2 (L)   Protein Total 6.7 (L)   Bilirubin Total 0.7   Alkaline Phosphatase 325 (H)   ALT 56   AST 27   Glucose 96   WBC 1.4 (L)   Hemoglobin 10.2 (L)   Hematocrit 32.2 (L)   Platelet Count 94 (L)   RBC Count 3.71 (L)   MCV 87   MCH 27.5   MCHC 31.7   RDW 15.3 (H)   % Neutrophils 26   % Lymphocytes 64   % Monocytes 6   % Eosinophils 4   % Basophils 0   Absolute Basophils 0.0   Absolute Neutrophil 0.4 (LL)   Absolute Lymphocytes 0.9   Absolute Monocytes 0.1   Absolute Eosinophils 0.1   RBC Morphology Confirmed RBC Indices   Platelet Morphology Automated Count Confirmed. Platelet morphology is normal.   Pathologist Review Comments See Comment       Assessment and Plan:  1. 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 High PD-L1. Was on Keytruda but had progression, now on Doxil + Ifos started 10/26/21.     Did not tolerate well-had significant mucositis, poor PO intake, nausea, and electrolyte derrangements. He has had less cancer pain and CT with stable disease. Cycle 2 given 12/1/21 with 10% dose reduction.    Doing better this round. Continue 2x weekly labs and IVF. Plan for cycle 3 first week of January and see MARLEEN prior.    Repeat imaging after cycle 4.      2. GI  Dyspepsia: Continue Omeprazole back to 20mg BID. Continue Tums PRN     CINV: Continue  Zofran PRN and Zyprexa at bedtime. Continue Compazine and Lorazepam PRN. Continue Emend inpatient on day 1 and day 6.     LFT elevation: 2/2 statin, improving    Gas: Continue Gas-x PRN      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 Cal Saba endocrine.     Hypothyroidism continue Synthroid 75mcg daily.      4. Derm  Rosacea: Long standing issue, continue Metrogel PRN. No new skin concerns.      5. MSK  Left shoulder/back/chest wall pain 2/2 tumor, following with palliative. Continue topical Bengay and PO Celebrex BID. Pain improved.     Monitor tingling/neuropathy. Minimal.      6. ENT  Hoarseness: Thought 2/2 mediastinal mass vs irritation from prior biopsy. Following with ENT, s/p vocal cord infection 7/1/21. Will see our voice clinic in Dec due to ongoing issues. This is improved.     Mucositis: Significant, 2/2 Doxil. Better with dose reduction. Continue salt/soda rinses. Will finish fluconazole today for thrush. Continue Nystatin a few more days, then stop if symptoms resolved.      7. Pulm/Cards  Dyspnea with talking, prior work-up with CT PE negative for PE, infection, pneumonitis; troponin negative; COVID negative. Sating well on RA. Agree with speech pathology thoughts on laryngeal dysfunction. Symptoms improved.       Continue Guaifenesin-Coedine PRN for cough, much improved.      Saw cardiology, echo WNL. Has mild CAD, recommended to start on statin however now HELD for LFT elevation, improving.      8. FEN  Hypokalemia: 2/2 Ifos and poor PO intake. Take 40meq today and then stop, continue monitoring. He is eating better so this should improve.       Hypophosphatemia: 2/2 ifos and poor PO intake. Still a bit low. Continue 250mg TID and monitoring.      Poor PO intake: 2/2 mucositis and nausea as above. Improved now, good appetite and eating well.     Dehydration: Twice weekly fluids at [place]. Elevate legs and wear compression to prevent additional swelling.      9.  Psych  Anxiety/depression: Hx of issues, recurrent issue when he was feeling poorly from treatment. Now that he is physically doing better his mood is much improved.     40 minutes spent on the date of the encounter doing chart review, review of test results, interpretation of tests, patient visit and documentation     Maynor Rios PA-C  Department of Hematology and Oncology  [facility]

## 2021-12-15 ENCOUNTER — INFUSION THERAPY VISIT (OUTPATIENT)
Dept: INFUSION THERAPY | Facility: CLINIC | Age: 71
End: 2021-12-15
Attending: PHYSICIAN ASSISTANT
Payer: MEDICARE

## 2021-12-15 ENCOUNTER — VIRTUAL VISIT (OUTPATIENT)
Dept: ONCOLOGY | Facility: CLINIC | Age: 71
End: 2021-12-15
Attending: INTERNAL MEDICINE
Payer: MEDICARE

## 2021-12-15 VITALS — TEMPERATURE: 97 F | SYSTOLIC BLOOD PRESSURE: 151 MMHG | DIASTOLIC BLOOD PRESSURE: 64 MMHG

## 2021-12-15 DIAGNOSIS — C49.9 SARCOMA (H): Primary | ICD-10-CM

## 2021-12-15 DIAGNOSIS — T45.1X5A CHEMOTHERAPY-INDUCED NAUSEA: ICD-10-CM

## 2021-12-15 DIAGNOSIS — R11.0 CHEMOTHERAPY-INDUCED NAUSEA: ICD-10-CM

## 2021-12-15 PROCEDURE — 99214 OFFICE O/P EST MOD 30 MIN: CPT | Mod: 95 | Performed by: PHYSICIAN ASSISTANT

## 2021-12-15 PROCEDURE — 96361 HYDRATE IV INFUSION ADD-ON: CPT

## 2021-12-15 PROCEDURE — 96360 HYDRATION IV INFUSION INIT: CPT

## 2021-12-15 PROCEDURE — G0463 HOSPITAL OUTPT CLINIC VISIT: HCPCS | Mod: PN,RTG | Performed by: PHYSICIAN ASSISTANT

## 2021-12-15 PROCEDURE — 258N000003 HC RX IP 258 OP 636: Performed by: PHYSICIAN ASSISTANT

## 2021-12-15 RX ADMIN — SODIUM CHLORIDE 1000 ML: 9 INJECTION, SOLUTION INTRAVENOUS at 14:37

## 2021-12-15 NOTE — PROGRESS NOTES
Infusion Nursing Note:  Ifrah Huitron presents today for IVF.    Patient seen by provider today: No   present during visit today: Not Applicable.    Note: Started infusion at 500mL/hour, increased to 999mL/hr after 1 hour since patient has tolerated fluids well in the past.    PIV saline locked and left in place for infusion appointment on 12/16/21.      Intravenous Access:  Peripheral IV placed.    Treatment Conditions:  Not Applicable.      Post Infusion Assessment:  Patient tolerated infusion without incident.  Blood return noted pre and post infusion.  Site patent and intact, free from redness, edema or discomfort.  No evidence of extravasations.   IV left in place for appointment on 12/16/21.      Discharge Plan:   Copy of AVS reviewed with patient and/or family.  Patient will return 12/16/21 for next appointment.  Patient discharged in stable condition accompanied by: self.  Departure Mode: Ambulatory.      Suzette Riley RN

## 2021-12-16 ENCOUNTER — APPOINTMENT (OUTPATIENT)
Dept: LAB | Facility: CLINIC | Age: 71
End: 2021-12-16
Payer: MEDICARE

## 2021-12-16 ENCOUNTER — INFUSION THERAPY VISIT (OUTPATIENT)
Dept: INFUSION THERAPY | Facility: CLINIC | Age: 71
End: 2021-12-16
Attending: PHYSICIAN ASSISTANT
Payer: MEDICARE

## 2021-12-16 VITALS — HEART RATE: 80 BPM | TEMPERATURE: 97.7 F | DIASTOLIC BLOOD PRESSURE: 75 MMHG | SYSTOLIC BLOOD PRESSURE: 129 MMHG

## 2021-12-16 DIAGNOSIS — C45.9 MESOTHELIOMA, MALIGNANT (H): ICD-10-CM

## 2021-12-16 DIAGNOSIS — E87.6 HYPOKALEMIA: ICD-10-CM

## 2021-12-16 DIAGNOSIS — T45.1X5A CHEMOTHERAPY-INDUCED NEUTROPENIA (H): ICD-10-CM

## 2021-12-16 DIAGNOSIS — D70.1 CHEMOTHERAPY-INDUCED NEUTROPENIA (H): ICD-10-CM

## 2021-12-16 DIAGNOSIS — Z51.89 ENCOUNTER FOR OTHER SPECIFIED AFTERCARE: ICD-10-CM

## 2021-12-16 DIAGNOSIS — C49.9 SPINDLE CELL SARCOMA (H): ICD-10-CM

## 2021-12-16 DIAGNOSIS — E83.39 HYPOPHOSPHATEMIA: ICD-10-CM

## 2021-12-16 LAB
ALBUMIN SERPL-MCNC: 3.1 G/DL (ref 3.4–5)
ALP SERPL-CCNC: 276 U/L (ref 40–150)
ALT SERPL W P-5'-P-CCNC: 34 U/L (ref 0–70)
ANION GAP SERPL CALCULATED.3IONS-SCNC: 8 MMOL/L (ref 3–14)
AST SERPL W P-5'-P-CCNC: 15 U/L (ref 0–45)
BILIRUB SERPL-MCNC: 0.5 MG/DL (ref 0.2–1.3)
BUN SERPL-MCNC: 14 MG/DL (ref 7–30)
CALCIUM SERPL-MCNC: 9 MG/DL (ref 8.5–10.1)
CHLORIDE BLD-SCNC: 110 MMOL/L (ref 94–109)
CO2 SERPL-SCNC: 29 MMOL/L (ref 20–32)
CREAT SERPL-MCNC: 0.77 MG/DL (ref 0.66–1.25)
ERYTHROCYTE [DISTWIDTH] IN BLOOD BY AUTOMATED COUNT: 15.2 % (ref 10–15)
GFR SERPL CREATININE-BSD FRML MDRD: 90 ML/MIN/1.73M2
GLUCOSE BLD-MCNC: 94 MG/DL (ref 70–99)
HCT VFR BLD AUTO: 28.3 % (ref 40–53)
HGB BLD-MCNC: 9.1 G/DL (ref 13.3–17.7)
MAGNESIUM SERPL-MCNC: 2.3 MG/DL (ref 1.6–2.3)
MCH RBC QN AUTO: 28 PG (ref 26.5–33)
MCHC RBC AUTO-ENTMCNC: 32.2 G/DL (ref 31.5–36.5)
MCV RBC AUTO: 87 FL (ref 78–100)
PHOSPHATE SERPL-MCNC: 1.7 MG/DL (ref 2.5–4.5)
PLATELET # BLD AUTO: 64 10E3/UL (ref 150–450)
POTASSIUM BLD-SCNC: 2.7 MMOL/L (ref 3.4–5.3)
PROT SERPL-MCNC: 6.6 G/DL (ref 6.8–8.8)
RBC # BLD AUTO: 3.25 10E6/UL (ref 4.4–5.9)
SODIUM SERPL-SCNC: 147 MMOL/L (ref 133–144)
WBC # BLD AUTO: 3.1 10E3/UL (ref 4–11)

## 2021-12-16 PROCEDURE — 85027 COMPLETE CBC AUTOMATED: CPT | Performed by: PHYSICIAN ASSISTANT

## 2021-12-16 PROCEDURE — 84100 ASSAY OF PHOSPHORUS: CPT | Performed by: PHYSICIAN ASSISTANT

## 2021-12-16 PROCEDURE — 250N000013 HC RX MED GY IP 250 OP 250 PS 637: Performed by: PHYSICIAN ASSISTANT

## 2021-12-16 PROCEDURE — 83735 ASSAY OF MAGNESIUM: CPT | Performed by: PHYSICIAN ASSISTANT

## 2021-12-16 PROCEDURE — 82040 ASSAY OF SERUM ALBUMIN: CPT | Performed by: PHYSICIAN ASSISTANT

## 2021-12-16 PROCEDURE — 36415 COLL VENOUS BLD VENIPUNCTURE: CPT

## 2021-12-16 RX ORDER — POTASSIUM CHLORIDE 1500 MG/1
40 TABLET, EXTENDED RELEASE ORAL ONCE
Status: COMPLETED | OUTPATIENT
Start: 2021-12-16 | End: 2021-12-16

## 2021-12-16 RX ORDER — POTASSIUM CHLORIDE 1500 MG/1
40 TABLET, EXTENDED RELEASE ORAL DAILY
Qty: 30 TABLET | Refills: 1 | Status: SHIPPED | OUTPATIENT
Start: 2021-12-16 | End: 2021-12-28

## 2021-12-16 RX ADMIN — POTASSIUM CHLORIDE 40 MEQ: 20 TABLET, EXTENDED RELEASE ORAL at 15:49

## 2021-12-16 NOTE — PROGRESS NOTES
Speech-Language Pathology Department   EVALUATION  [facility]      12/09/21 1000   General Information   Type Of Visit Initial   Start Of Care Date 12/16/21   Referring Physician Dr. Sherry Esquivel   Orders Evaluate And Treat   Orders Comment Clinical Swallow Evaluation with endoscopic visualization provided by Dr. Esquivel   Medical Diagnosis Dysphagia   Pertinent History of Current Problem/OT: Additional Occupational Profile Info Ifrah Huitron is a 71-year-old man who was referred to Dr. Esquivel for left vocal fold paralysis. He underwent injection by Dr. Bearden however this did not improve function, so he was referred to the [place]. He worked with Jemima Mantilla MA, CCC-SLP at [facility] for coughing, laryngeal tension and voice therapy. 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 He was discharged from the hospital yesterday. He had been admitted for his chemotherapy infusion with noted nausea and poor po intake. He feels like his swallow is improving but has some coughing about 2-3 times a week where he feels like fluids go down the wrong pipe.    Respiratory Status Room air   Prior Level Of Function Swallowing   Prior Level Of Function Comment Regular solids and thin liquids   General Observations Pt highly pleasant and cooperative throughout evaluation   Patient/family Goals Pt would like to continue eating/drinking.    Clinical Swallow Evaluation   Oral Musculature generally intact   Dentition present and adequate   Mucosal Quality good   Mandibular Strength and Mobility intact   Oral Labial Strength and Mobility WFL   Lingual Strength and Mobility WFL   Velar Elevation intact   Laryngeal Function Throat clear;Swallow;Voicing initiated   Oral Musculature Comments impaired vocal mobility due to vc paralysis   Clinical Swallow Eval:  Thin Liquid Texture Trial   Mode of Presentation, Thin Liquids straw;self-fed   Volume of Liquid or Food Presented single straw sips   Oral Phase of Swallow Premature pharyngeal entry   Pharyngeal Phase of Swallow impaired;repeated swallows   Diagnostic Statement Trace penetration intermittently on thin liquid trials, likely due in part to delayed initiation of the swallow. Smaller bolus size eliminates aspiration/penetration.    Clinical Swallow Eval: Puree Solid Texture Trial   Mode of Presentation, Puree self-fed;spoon   Volume of Puree Presented tsp trials puree   Oral Phase, Puree WFL   Pharyngeal Phase, Puree repeated swallows   Diagnostic Statement Small amount of residue noted on the tip of the epiglottis on the left side. This is resolved with subsequent swallow   Clinical Swallow Eval: Regular (Solid)   Mode of Presentation self-fed   Volume Presented bites of Julia johnson   Oral Phase WFL   Pharyngeal Phase impaired   Diagnostic Statement Residual noted in the vallecula on the left side. He is able to clear the residual with a sip of liquid.    Educational Assessment   Barriers to Learning No barriers   Esophageal Phase of Swallow   Patient reports or presents with symptoms of esophageal dysphagia No   Swallow Eval: Clinical Impressions   Skilled Criteria for Therapy Intervention No problems identified which require skilled intervention   Dysphagia Outcome Severity Scale (REINALDO) Level 5 - REINALDO   Treatment Diagnosis Mild pharyngeal dysphagia   Diet texture recommendations Regular diet;Thin liquids (level 0)   Recommended Feeding/Eating Techniques alternate between small bites and sips of food/liquid;hard swallow w/ each bite or sip;small sips/bites   Predicted Duration of Therapy Intervention (days/wks) Evaluation only at this time. He has multiple other medical issues. He will notify ENT if there are changes in swallow function and increased s/sx of aspiration for repeat evaluation.    Risks and  Benefits of Treatment have been explained. Yes   Patient, family and/or staff in agreement with Plan of Care Yes   Clinical Impression Comments Ifrah Huitron demonstrates mild pharyngeal dysphagia as characterized by penetration on larger sips of thin liquid which improves with small sips. He demonstrates residual in the left vallecula on solid consistency which is reduced with a sip of liquid. He demonstrates no aspiration events during evaluation today. He has pharyngeal weakness likely due in part to recent illness and current treatments. He demonstrates adequate ROM and strength of oral mechanism. Recommend he continue regular moist solids and thin liquids. Sit upright for po intake. Encouraged small sips and alternating bites/sips. He will participate in voice therapy. He will notify team if there are changes in swallow function with increased episodes of coughing/choking and re-evaluation will be indicated at that time.    Total Session Time   SLP Eval: oral/pharyngeal swallow function, clinical minutes (57344) 15   Total Evaluation Time 15       Thank you for the referral of Ifrah Huitron. If you have any questions about this report, please contact me using the information below.      Ami Wong MS, CCC-SLP  Speech-Language Pathology  [facility]  Department of Otolaryngology/D&T - 4th floor  Pager: [phone]  Phone: [phone]  Email: [email or web address]

## 2021-12-16 NOTE — PROGRESS NOTES
Infusion Nursing Note:  Ifrah Huitron presents today for possible fluids.    Patient seen by provider today: No   present during visit today: Not Applicable.    Note: K+ 2.7. Patient states his oral intake is poor. Spoke with kika Mullins to give 40 mEQ of PO potassium now and he will take an additional 40 mEq later tonight at home. No fluids today.   He will return tomorrow for potassium level recheck, IV fluids and possible IV potassium replacement per Maynor Rios. Patient is agreement with plan.     Intravenous Access:  IV remain in place from yesterday. .    Treatment Conditions:  Not Applicable.      Discharge Plan:   Patient discharged in stable condition accompanied by: self.  Departure Mode: Ambulatory. Pt will return tomorrow.       Anup Velasco RN

## 2021-12-17 ENCOUNTER — APPOINTMENT (OUTPATIENT)
Dept: LAB | Facility: CLINIC | Age: 71
End: 2021-12-17
Payer: MEDICARE

## 2021-12-17 ENCOUNTER — INFUSION THERAPY VISIT (OUTPATIENT)
Dept: INFUSION THERAPY | Facility: CLINIC | Age: 71
End: 2021-12-17
Attending: PHYSICIAN ASSISTANT
Payer: MEDICARE

## 2021-12-17 VITALS
RESPIRATION RATE: 16 BRPM | SYSTOLIC BLOOD PRESSURE: 160 MMHG | HEART RATE: 82 BPM | DIASTOLIC BLOOD PRESSURE: 90 MMHG | TEMPERATURE: 98.2 F

## 2021-12-17 DIAGNOSIS — R11.0 CHEMOTHERAPY-INDUCED NAUSEA: ICD-10-CM

## 2021-12-17 DIAGNOSIS — C49.9 SPINDLE CELL SARCOMA (H): Primary | ICD-10-CM

## 2021-12-17 DIAGNOSIS — C49.9 SARCOMA (H): ICD-10-CM

## 2021-12-17 DIAGNOSIS — T45.1X5A CHEMOTHERAPY-INDUCED NAUSEA: ICD-10-CM

## 2021-12-17 LAB
ALBUMIN SERPL-MCNC: 3 G/DL (ref 3.4–5)
ALP SERPL-CCNC: 262 U/L (ref 40–150)
ALT SERPL W P-5'-P-CCNC: 33 U/L (ref 0–70)
ANION GAP SERPL CALCULATED.3IONS-SCNC: 5 MMOL/L (ref 3–14)
AST SERPL W P-5'-P-CCNC: 15 U/L (ref 0–45)
BILIRUB SERPL-MCNC: 0.4 MG/DL (ref 0.2–1.3)
BUN SERPL-MCNC: 14 MG/DL (ref 7–30)
CALCIUM SERPL-MCNC: 8.9 MG/DL (ref 8.5–10.1)
CHLORIDE BLD-SCNC: 110 MMOL/L (ref 94–109)
CO2 SERPL-SCNC: 31 MMOL/L (ref 20–32)
CREAT SERPL-MCNC: 0.78 MG/DL (ref 0.66–1.25)
GFR SERPL CREATININE-BSD FRML MDRD: 90 ML/MIN/1.73M2
GLUCOSE BLD-MCNC: 82 MG/DL (ref 70–99)
POTASSIUM BLD-SCNC: 3.4 MMOL/L (ref 3.4–5.3)
PROT SERPL-MCNC: 6.5 G/DL (ref 6.8–8.8)
SODIUM SERPL-SCNC: 146 MMOL/L (ref 133–144)

## 2021-12-17 PROCEDURE — 96360 HYDRATION IV INFUSION INIT: CPT

## 2021-12-17 PROCEDURE — 82040 ASSAY OF SERUM ALBUMIN: CPT | Performed by: PHYSICIAN ASSISTANT

## 2021-12-17 PROCEDURE — 36415 COLL VENOUS BLD VENIPUNCTURE: CPT

## 2021-12-17 PROCEDURE — 258N000003 HC RX IP 258 OP 636: Performed by: PHYSICIAN ASSISTANT

## 2021-12-17 RX ADMIN — SODIUM CHLORIDE 1000 ML: 9 INJECTION, SOLUTION INTRAVENOUS at 09:56

## 2021-12-17 NOTE — PROGRESS NOTES
Infusion Nursing Note:  Ifrah Huitron presents today for IVF.    Patient seen by provider today: No   present during visit today: Not Applicable.    Note: Pt states he feels dehydrated and requesting fluids today. Not taking fluids as well as he should at home. Pt reports for the last 2 days he's felt burning and discomfort in his esophagus. Pt is able to swallow and pass food and fluids but there is notable discomfort.      Intravenous Access:  Peripheral IV placed. (Existing IV)    Treatment Conditions:  Lab Results   Component Value Date     (H) 12/17/2021    POTASSIUM 3.4 12/17/2021    MAG 2.3 12/16/2021    CR 0.78 12/17/2021    COTY 8.9 12/17/2021    BILITOTAL 0.4 12/17/2021    ALBUMIN 3.0 (L) 12/17/2021    ALT 33 12/17/2021    AST 15 12/17/2021     Results reviewed, labs did NOT meet treatment parameters: CR .72    RN spoke with Maynor CHOW for orders to proceed with fluids because pt feels dehydrated. Addressing the esoph pain, she's increasing his omeprozole to 40mg every day. Pt aware of this change and verbalizes understanding.      Post Infusion Assessment:  Patient tolerated infusion without incident.  Site patent and intact, free from redness, edema or discomfort.  No evidence of extravasations.       Discharge Plan:   Discharge instructions reviewed with: Patient.  Patient and/or family verbalized understanding of discharge instructions and all questions answered.  Patient discharged in stable condition accompanied by: self.  Departure Mode: Ambulatory.      Sarahi Astudillo RN

## 2021-12-20 ENCOUNTER — INFUSION THERAPY VISIT (OUTPATIENT)
Dept: INFUSION THERAPY | Facility: CLINIC | Age: 71
End: 2021-12-20
Attending: PHYSICIAN ASSISTANT
Payer: MEDICARE

## 2021-12-20 ENCOUNTER — APPOINTMENT (OUTPATIENT)
Dept: LAB | Facility: CLINIC | Age: 71
End: 2021-12-20
Payer: MEDICARE

## 2021-12-20 VITALS — TEMPERATURE: 98.4 F | SYSTOLIC BLOOD PRESSURE: 162 MMHG | HEART RATE: 63 BPM | DIASTOLIC BLOOD PRESSURE: 99 MMHG

## 2021-12-20 DIAGNOSIS — D70.1 CHEMOTHERAPY-INDUCED NEUTROPENIA (H): ICD-10-CM

## 2021-12-20 DIAGNOSIS — C45.9 MESOTHELIOMA, MALIGNANT (H): ICD-10-CM

## 2021-12-20 DIAGNOSIS — T45.1X5A CHEMOTHERAPY-INDUCED NEUTROPENIA (H): ICD-10-CM

## 2021-12-20 DIAGNOSIS — C49.9 SPINDLE CELL SARCOMA (H): ICD-10-CM

## 2021-12-20 DIAGNOSIS — Z51.89 ENCOUNTER FOR OTHER SPECIFIED AFTERCARE: ICD-10-CM

## 2021-12-20 LAB
ALBUMIN SERPL-MCNC: 2.9 G/DL (ref 3.4–5)
ALP SERPL-CCNC: 308 U/L (ref 40–150)
ALT SERPL W P-5'-P-CCNC: 43 U/L (ref 0–70)
ANION GAP SERPL CALCULATED.3IONS-SCNC: 3 MMOL/L (ref 3–14)
AST SERPL W P-5'-P-CCNC: 31 U/L (ref 0–45)
BASOPHILS # BLD MANUAL: 0 10E3/UL (ref 0–0.2)
BASOPHILS NFR BLD MANUAL: 0 %
BILIRUB SERPL-MCNC: 0.2 MG/DL (ref 0.2–1.3)
BUN SERPL-MCNC: 10 MG/DL (ref 7–30)
CALCIUM SERPL-MCNC: 9.1 MG/DL (ref 8.5–10.1)
CHLORIDE BLD-SCNC: 109 MMOL/L (ref 94–109)
CO2 SERPL-SCNC: 33 MMOL/L (ref 20–32)
CREAT SERPL-MCNC: 0.77 MG/DL (ref 0.66–1.25)
EOSINOPHIL # BLD MANUAL: 0 10E3/UL (ref 0–0.7)
EOSINOPHIL NFR BLD MANUAL: 0 %
ERYTHROCYTE [DISTWIDTH] IN BLOOD BY AUTOMATED COUNT: 15.8 % (ref 10–15)
GFR SERPL CREATININE-BSD FRML MDRD: 90 ML/MIN/1.73M2
GLUCOSE BLD-MCNC: 124 MG/DL (ref 70–99)
HCT VFR BLD AUTO: 28.4 % (ref 40–53)
HGB BLD-MCNC: 9.1 G/DL (ref 13.3–17.7)
LYMPHOCYTES # BLD MANUAL: 1.4 10E3/UL (ref 0.8–5.3)
LYMPHOCYTES NFR BLD MANUAL: 17 %
MAGNESIUM SERPL-MCNC: 1.7 MG/DL (ref 1.6–2.3)
MCH RBC QN AUTO: 27.9 PG (ref 26.5–33)
MCHC RBC AUTO-ENTMCNC: 32 G/DL (ref 31.5–36.5)
MCV RBC AUTO: 87 FL (ref 78–100)
METAMYELOCYTES # BLD MANUAL: 0.1 10E3/UL
METAMYELOCYTES NFR BLD MANUAL: 1 %
MONOCYTES # BLD MANUAL: 0.7 10E3/UL (ref 0–1.3)
MONOCYTES NFR BLD MANUAL: 9 %
MYELOCYTES # BLD MANUAL: 0.1 10E3/UL
MYELOCYTES NFR BLD MANUAL: 1 %
NEUTROPHILS # BLD MANUAL: 5.9 10E3/UL (ref 1.6–8.3)
NEUTROPHILS NFR BLD MANUAL: 72 %
PHOSPHATE SERPL-MCNC: 3 MG/DL (ref 2.5–4.5)
PLAT MORPH BLD: ABNORMAL
PLATELET # BLD AUTO: 145 10E3/UL (ref 150–450)
POLYCHROMASIA BLD QL SMEAR: SLIGHT
POTASSIUM BLD-SCNC: 2.9 MMOL/L (ref 3.4–5.3)
PROT SERPL-MCNC: 6.3 G/DL (ref 6.8–8.8)
RBC # BLD AUTO: 3.26 10E6/UL (ref 4.4–5.9)
RBC MORPH BLD: ABNORMAL
SODIUM SERPL-SCNC: 145 MMOL/L (ref 133–144)
WBC # BLD AUTO: 8.2 10E3/UL (ref 4–11)

## 2021-12-20 PROCEDURE — 82247 BILIRUBIN TOTAL: CPT | Performed by: PHYSICIAN ASSISTANT

## 2021-12-20 PROCEDURE — 83735 ASSAY OF MAGNESIUM: CPT | Performed by: PHYSICIAN ASSISTANT

## 2021-12-20 PROCEDURE — 84100 ASSAY OF PHOSPHORUS: CPT | Performed by: PHYSICIAN ASSISTANT

## 2021-12-20 PROCEDURE — 96366 THER/PROPH/DIAG IV INF ADDON: CPT

## 2021-12-20 PROCEDURE — 36415 COLL VENOUS BLD VENIPUNCTURE: CPT

## 2021-12-20 PROCEDURE — 250N000011 HC RX IP 250 OP 636: Performed by: PHYSICIAN ASSISTANT

## 2021-12-20 PROCEDURE — 85014 HEMATOCRIT: CPT | Performed by: PHYSICIAN ASSISTANT

## 2021-12-20 PROCEDURE — 96365 THER/PROPH/DIAG IV INF INIT: CPT

## 2021-12-20 PROCEDURE — 250N000013 HC RX MED GY IP 250 OP 250 PS 637: Performed by: NURSE PRACTITIONER

## 2021-12-20 PROCEDURE — 82040 ASSAY OF SERUM ALBUMIN: CPT | Performed by: PHYSICIAN ASSISTANT

## 2021-12-20 RX ORDER — POTASSIUM CHLORIDE 7.45 MG/ML
10 INJECTION INTRAVENOUS
Status: COMPLETED | OUTPATIENT
Start: 2021-12-20 | End: 2021-12-20

## 2021-12-20 RX ORDER — POTASSIUM CHLORIDE 1500 MG/1
20 TABLET, EXTENDED RELEASE ORAL ONCE
Status: COMPLETED | OUTPATIENT
Start: 2021-12-20 | End: 2021-12-20

## 2021-12-20 RX ORDER — POTASSIUM CHLORIDE 29.8 MG/ML
20 INJECTION INTRAVENOUS ONCE
Status: DISCONTINUED | OUTPATIENT
Start: 2021-12-20 | End: 2021-12-20 | Stop reason: CLARIF

## 2021-12-20 RX ADMIN — POTASSIUM CHLORIDE 10 MEQ: 7.46 INJECTION, SOLUTION INTRAVENOUS at 14:36

## 2021-12-20 RX ADMIN — POTASSIUM CHLORIDE 20 MEQ: 20 TABLET, EXTENDED RELEASE ORAL at 14:26

## 2021-12-20 RX ADMIN — POTASSIUM CHLORIDE 10 MEQ: 7.46 INJECTION, SOLUTION INTRAVENOUS at 15:50

## 2021-12-20 NOTE — PROGRESS NOTES
Infusion Nursing Note:  Ifrah Huitron presents today for Potassium replacement.    Patient seen by provider today: No   present during visit today: Not Applicable.    Note: Richar Kelly NP gave TORB for pt to have K+ replacement of 20 mEq oral and 20 mEq by IV. Pt is instructed to also take 20 mEq PO this evening and start taking 20 mEq PO BID.       Intravenous Access:  Peripheral IV placed. Pt c/o the potassium causing burning/irritation; decreased the rate of the infusion and also added extra NS bag to dilute.     Treatment Conditions:  Results reviewed; Richar Kelly NP called with orders.    Post Infusion Assessment:  Patient tolerated infusion poorly due to : pain/irritation; discussed possible port placement. He states he will talk with his care team about it.  Blood return noted pre and post infusion.  Site patent and intact, free from redness, edema or discomfort.  No evidence of extravasations.  Access discontinued per protocol.       Discharge Plan:   Patient discharged in stable condition accompanied by: self.  Departure Mode: Ambulatory.      Maria Simon RN

## 2021-12-20 NOTE — PATIENT INSTRUCTIONS
Take one potassium pill (20 mEq) tonight before bedtime and then begin taking 1 pill, 2 times daily - per Richar Kelly NP.

## 2021-12-23 ENCOUNTER — INFUSION THERAPY VISIT (OUTPATIENT)
Dept: INFUSION THERAPY | Facility: CLINIC | Age: 71
End: 2021-12-23
Attending: PHYSICIAN ASSISTANT
Payer: MEDICARE

## 2021-12-23 ENCOUNTER — APPOINTMENT (OUTPATIENT)
Dept: LAB | Facility: CLINIC | Age: 71
End: 2021-12-23
Payer: MEDICARE

## 2021-12-23 DIAGNOSIS — C49.9 SPINDLE CELL SARCOMA (H): Primary | ICD-10-CM

## 2021-12-23 DIAGNOSIS — R11.0 CHEMOTHERAPY-INDUCED NAUSEA: ICD-10-CM

## 2021-12-23 DIAGNOSIS — C49.9 SARCOMA (H): ICD-10-CM

## 2021-12-23 DIAGNOSIS — T45.1X5A CHEMOTHERAPY-INDUCED NAUSEA: ICD-10-CM

## 2021-12-23 LAB
ALBUMIN SERPL-MCNC: 2.9 G/DL (ref 3.4–5)
ALP SERPL-CCNC: 279 U/L (ref 40–150)
ALT SERPL W P-5'-P-CCNC: 36 U/L (ref 0–70)
ANION GAP SERPL CALCULATED.3IONS-SCNC: 5 MMOL/L (ref 3–14)
AST SERPL W P-5'-P-CCNC: 21 U/L (ref 0–45)
BASOPHILS # BLD MANUAL: 0.1 10E3/UL (ref 0–0.2)
BASOPHILS NFR BLD MANUAL: 1 %
BILIRUB SERPL-MCNC: 0.2 MG/DL (ref 0.2–1.3)
BUN SERPL-MCNC: 15 MG/DL (ref 7–30)
CALCIUM SERPL-MCNC: 9.1 MG/DL (ref 8.5–10.1)
CHLORIDE BLD-SCNC: 111 MMOL/L (ref 94–109)
CO2 SERPL-SCNC: 29 MMOL/L (ref 20–32)
CREAT SERPL-MCNC: 0.77 MG/DL (ref 0.66–1.25)
EOSINOPHIL # BLD MANUAL: 0 10E3/UL (ref 0–0.7)
EOSINOPHIL NFR BLD MANUAL: 0 %
ERYTHROCYTE [DISTWIDTH] IN BLOOD BY AUTOMATED COUNT: 17.2 % (ref 10–15)
GFR SERPL CREATININE-BSD FRML MDRD: >90 ML/MIN/1.73M2
GLUCOSE BLD-MCNC: 86 MG/DL (ref 70–99)
HCT VFR BLD AUTO: 31.2 % (ref 40–53)
HGB BLD-MCNC: 9.7 G/DL (ref 13.3–17.7)
LYMPHOCYTES # BLD MANUAL: 1.9 10E3/UL (ref 0.8–5.3)
LYMPHOCYTES NFR BLD MANUAL: 20 %
MAGNESIUM SERPL-MCNC: 2 MG/DL (ref 1.6–2.3)
MCH RBC QN AUTO: 28 PG (ref 26.5–33)
MCHC RBC AUTO-ENTMCNC: 31.1 G/DL (ref 31.5–36.5)
MCV RBC AUTO: 90 FL (ref 78–100)
MONOCYTES # BLD MANUAL: 1 10E3/UL (ref 0–1.3)
MONOCYTES NFR BLD MANUAL: 10 %
NEUTROPHILS # BLD MANUAL: 6.6 10E3/UL (ref 1.6–8.3)
NEUTROPHILS NFR BLD MANUAL: 69 %
PHOSPHATE SERPL-MCNC: 3.1 MG/DL (ref 2.5–4.5)
PLAT MORPH BLD: NORMAL
PLATELET # BLD AUTO: 249 10E3/UL (ref 150–450)
POTASSIUM BLD-SCNC: 4.1 MMOL/L (ref 3.4–5.3)
PROT SERPL-MCNC: 6.7 G/DL (ref 6.8–8.8)
RBC # BLD AUTO: 3.47 10E6/UL (ref 4.4–5.9)
RBC MORPH BLD: NORMAL
SODIUM SERPL-SCNC: 145 MMOL/L (ref 133–144)
WBC # BLD AUTO: 9.6 10E3/UL (ref 4–11)

## 2021-12-23 PROCEDURE — 85041 AUTOMATED RBC COUNT: CPT | Performed by: PHYSICIAN ASSISTANT

## 2021-12-23 PROCEDURE — 82040 ASSAY OF SERUM ALBUMIN: CPT | Performed by: PHYSICIAN ASSISTANT

## 2021-12-23 PROCEDURE — 36415 COLL VENOUS BLD VENIPUNCTURE: CPT

## 2021-12-23 PROCEDURE — 83735 ASSAY OF MAGNESIUM: CPT | Performed by: PHYSICIAN ASSISTANT

## 2021-12-23 PROCEDURE — 84100 ASSAY OF PHOSPHORUS: CPT | Performed by: PHYSICIAN ASSISTANT

## 2021-12-23 NOTE — PROGRESS NOTES
Infusion Nursing Note:  Ifrah Huitron presents today for IVF and possible potassium replacement.    Patient seen by provider today: No   present during visit today: Not Applicable.    Note: Patient stated he does not feel he needs fluids today. Patient did not meet parameters for IVF, creatinine less than 0.9, potassium within normal limits. Patient wanted to know if he needs to continue taking his potassium tablets, advised him to continue taking them but to reach out to his provider to determine how long he needs to continue taking them for. Patient verbalized understanding.      Intravenous Access:  No Intravenous access/labs at this visit.    Treatment Conditions:  Lab Results   Component Value Date     (H) 12/23/2021    POTASSIUM 4.1 12/23/2021    MAG 2.0 12/23/2021    CR 0.77 12/23/2021    COTY 9.1 12/23/2021    BILITOTAL 0.2 12/23/2021    ALBUMIN 2.9 (L) 12/23/2021    ALT 36 12/23/2021    AST 21 12/23/2021     Results reviewed, labs did NOT meet treatment parameters: Creatinine 0.77.      Post Infusion Assessment:  NA.       Discharge Plan:   Copy of AVS reviewed with patient and/or family.  Patient will return 12/27/21 for next appointment.  Patient discharged in stable condition accompanied by: self.  Departure Mode: Ambulatory.      Suzette Riley RN

## 2021-12-24 ENCOUNTER — PATIENT OUTREACH (OUTPATIENT)
Dept: CARE COORDINATION | Facility: CLINIC | Age: 71
End: 2021-12-24
Payer: MEDICARE

## 2021-12-24 DIAGNOSIS — D70.1 CHEMOTHERAPY-INDUCED NEUTROPENIA (H): ICD-10-CM

## 2021-12-24 DIAGNOSIS — C49.9 SARCOMA (H): Primary | ICD-10-CM

## 2021-12-24 DIAGNOSIS — T45.1X5A CHEMOTHERAPY-INDUCED NEUTROPENIA (H): ICD-10-CM

## 2021-12-24 ASSESSMENT — ACTIVITIES OF DAILY LIVING (ADL): DEPENDENT_IADLS:: INDEPENDENT

## 2021-12-27 ENCOUNTER — APPOINTMENT (OUTPATIENT)
Dept: LAB | Facility: CLINIC | Age: 71
End: 2021-12-27
Payer: MEDICARE

## 2021-12-27 ENCOUNTER — INFUSION THERAPY VISIT (OUTPATIENT)
Dept: INFUSION THERAPY | Facility: CLINIC | Age: 71
End: 2021-12-27
Attending: PHYSICIAN ASSISTANT
Payer: MEDICARE

## 2021-12-27 DIAGNOSIS — C49.9 SPINDLE CELL SARCOMA (H): ICD-10-CM

## 2021-12-27 LAB
ALBUMIN SERPL-MCNC: 3 G/DL (ref 3.4–5)
ALP SERPL-CCNC: 240 U/L (ref 40–150)
ALT SERPL W P-5'-P-CCNC: 46 U/L (ref 0–70)
ANION GAP SERPL CALCULATED.3IONS-SCNC: 4 MMOL/L (ref 3–14)
AST SERPL W P-5'-P-CCNC: 31 U/L (ref 0–45)
BASOPHILS # BLD MANUAL: 0.4 10E3/UL (ref 0–0.2)
BASOPHILS NFR BLD MANUAL: 3 %
BILIRUB SERPL-MCNC: 0.2 MG/DL (ref 0.2–1.3)
BUN SERPL-MCNC: 13 MG/DL (ref 7–30)
CALCIUM SERPL-MCNC: 8.5 MG/DL (ref 8.5–10.1)
CHLORIDE BLD-SCNC: 109 MMOL/L (ref 94–109)
CO2 SERPL-SCNC: 30 MMOL/L (ref 20–32)
CREAT SERPL-MCNC: 0.83 MG/DL (ref 0.66–1.25)
EOSINOPHIL # BLD MANUAL: 0 10E3/UL (ref 0–0.7)
EOSINOPHIL NFR BLD MANUAL: 0 %
ERYTHROCYTE [DISTWIDTH] IN BLOOD BY AUTOMATED COUNT: 18.2 % (ref 10–15)
GFR SERPL CREATININE-BSD FRML MDRD: >90 ML/MIN/1.73M2
GLUCOSE BLD-MCNC: 96 MG/DL (ref 70–99)
HCT VFR BLD AUTO: 30.9 % (ref 40–53)
HGB BLD-MCNC: 9.4 G/DL (ref 13.3–17.7)
LYMPHOCYTES # BLD MANUAL: 1 10E3/UL (ref 0.8–5.3)
LYMPHOCYTES NFR BLD MANUAL: 8 %
MAGNESIUM SERPL-MCNC: 2.1 MG/DL (ref 1.6–2.3)
MCH RBC QN AUTO: 27.7 PG (ref 26.5–33)
MCHC RBC AUTO-ENTMCNC: 30.4 G/DL (ref 31.5–36.5)
MCV RBC AUTO: 91 FL (ref 78–100)
MONOCYTES # BLD MANUAL: 0.9 10E3/UL (ref 0–1.3)
MONOCYTES NFR BLD MANUAL: 7 %
MYELOCYTES # BLD MANUAL: 0.1 10E3/UL
MYELOCYTES NFR BLD MANUAL: 1 %
NEUTROPHILS # BLD MANUAL: 10 10E3/UL (ref 1.6–8.3)
NEUTROPHILS NFR BLD MANUAL: 81 %
NRBC # BLD AUTO: 0.1 10E3/UL
NRBC BLD MANUAL-RTO: 1 %
PHOSPHATE SERPL-MCNC: 3.7 MG/DL (ref 2.5–4.5)
PLAT MORPH BLD: ABNORMAL
PLATELET # BLD AUTO: 298 10E3/UL (ref 150–450)
POTASSIUM BLD-SCNC: 4.1 MMOL/L (ref 3.4–5.3)
PROT SERPL-MCNC: 6.6 G/DL (ref 6.8–8.8)
RBC # BLD AUTO: 3.39 10E6/UL (ref 4.4–5.9)
RBC MORPH BLD: ABNORMAL
SODIUM SERPL-SCNC: 143 MMOL/L (ref 133–144)
WBC # BLD AUTO: 12.3 10E3/UL (ref 4–11)

## 2021-12-27 PROCEDURE — 84100 ASSAY OF PHOSPHORUS: CPT | Performed by: PHYSICIAN ASSISTANT

## 2021-12-27 PROCEDURE — 80053 COMPREHEN METABOLIC PANEL: CPT | Performed by: PHYSICIAN ASSISTANT

## 2021-12-27 PROCEDURE — 85027 COMPLETE CBC AUTOMATED: CPT | Performed by: PHYSICIAN ASSISTANT

## 2021-12-27 PROCEDURE — 36415 COLL VENOUS BLD VENIPUNCTURE: CPT

## 2021-12-27 PROCEDURE — 83735 ASSAY OF MAGNESIUM: CPT | Performed by: PHYSICIAN ASSISTANT

## 2021-12-27 NOTE — PROGRESS NOTES
Infusion Nursing Note:  Ifrah Huitron presents today for IVF/potassium replacement.    Patient seen by provider today: No   present during visit today: Not Applicable.    Note: Patient not seen in infusion today, potassium within normal limits and creatinine < 0.9.      Intravenous Access:  No Intravenous access/labs at this visit.    Treatment Conditions:  Lab Results   Component Value Date     12/27/2021    POTASSIUM 4.1 12/27/2021    MAG 2.1 12/27/2021    CR 0.83 12/27/2021    COTY 8.5 12/27/2021    BILITOTAL 0.2 12/27/2021    ALBUMIN 3.0 (L) 12/27/2021    ALT 46 12/27/2021    AST 31 12/27/2021     Results reviewed, labs did NOT meet treatment parameters: Creatinine 0.83, potassium WNL.      Post Infusion Assessment:  NA.       Discharge Plan:   Copy of AVS reviewed with patient and/or family.  Patient will return 12/30/21 for next appointment.  Patient discharged in stable condition accompanied by: self.  Departure Mode: Ambulatory.      Suzette Riley RN

## 2021-12-27 NOTE — PROGRESS NOTES
Ifrah is a 71 year old who is being evaluated via a billable video visit.      How would you like to obtain your AVS? MyChart  If the video visit is dropped, the invitation should be resent by: Text to cell phone: [phone]  Will anyone else be joining your video visit? Emma Claros    Video Start Time: 11:01am    Video-Visit Details    Type of service:  Video Visit    Video End Time: 11:22am    Originating Location (pt. Location): Home    Distant Location (provider location):  [facility]     Platform used for Video Visit: [facility]     Oncology/Hematology Visit Note  Dec 28, 2021    Reason for Visit: Follow up of spindle cell sarcoma     History of Present Illness: Ifrah Huitron is a 71 year old male with PMH rosacea, pituitary macroadenoma s/p resection, GERD, kidney stones, DJD with metastatic spindle cell sarcoma. He presented to his PCP March 2021 with chest pain/left shoulder and back pain that led to imaging which revealed multiple lung mets. There were difficulties in getting diagnostic biopsy, eventually biopsy of mediastinal mass with spindle cell sarcoma. There was high PD-L1 expression (90%). Baseline imaging 6/21/21 with progression of lung mets and left-sided subdiaphragmatic mass, stable liver lesions. 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      He started Keytruda 6/21/21. CT 8/2/21 with positive response to treatment. CT 10/18/21 with mixed response- LUQ mass larger, anterior mediastinal and left anterior pleural mass smaller. Keytruda stopped.     Started on Doxil + Ifosfamide 10/26/21. Poorly tolerated with mucositis, nausea, poor oral intake. CT with stable to positive response.      Received C2 12/1/21 (delayed for tolerance issues) with 10% dose reduction.    Interval History:  Ifrah was called today for follow-up. He is feeling really well. His only concern is mild pain  "and peeling in his finger tips that is slowly improving. No other skin concerns. Mouth sores resolved. Eating and drinking well. He is interested in getting a port but wants to make sure he can golf.    He denies fevers, headaches, dizziness, chest pain, SOB, cough, nausea, vomiting, abdominal pain, bowel concerns, bladder issues. Neuropathy is slight but improving. Has mild leg swelling. Overall this is the best he has felt in months. No cancer pain.     Current Outpatient Medications   Medication Sig Dispense Refill     acetaminophen (TYLENOL) 500 MG tablet Take 500-1,000 mg by mouth every 6 hours as needed for mild pain       calcium carbonate (TUMS) 500 MG chewable tablet Take 1 tablet (500 mg) by mouth 3 times daily as needed for heartburn       celecoxib (CELEBREX) 200 MG capsule Take 1 capsule (200 mg) by mouth 2 times daily. Note this is a new a strength! Only one capsule at a time! 60 capsule 3     cholecalciferol (VITAMIN D-1000 MAX ST) 1000 units TABS Take 1,000 Units by mouth daily        doxepin (SINEQUAN) 10 MG/ML (HIGH CONC) solution Take 2 mLs (20 mg) by mouth every 4 hours as needed for other (mouth pain) . Mix with equal parts tap water. Swish and hold in mouth ~60 seconds then spit out. 118 mL 3     guaiFENesin-codeine (GUAIFENESIN AC) 100-10 MG/5ML syrup Take 10 mLs by mouth every 4 hours as needed for cough 118 mL 0     hydrocortisone (CORTEF) 10 MG tablet Take 20 mg in the morning and 10 mg in the afternoon       Insulin Syringe-Needle U-100 27.5G X 5/8\" 2 ML MISC 1 each daily as needed (with solucortef for adrenal crisis) 10 each 1     levothyroxine (SYNTHROID/LEVOTHROID) 75 MCG tablet Take 75 mcg by mouth every morning        LORazepam (ATIVAN) 0.5 MG tablet Take 1 tablet (0.5 mg) by mouth every 4 hours as needed for nausea or vomiting . Caution: causes sedation. 30 tablet 0     Menthol-Methyl Salicylate (DALIA MALIK GREASELESS) cream Apply topically every 6 hours as needed       metroNIDAZOLE " (METROGEL) 0.75 % external gel Apply topically 2 times daily 45 g 11     nystatin (MYCOSTATIN) 295288 UNIT/ML suspension Take 5 mLs (500,000 Units) by mouth 4 times daily . Start at first signs of white coating on tongue. 473 mL 0     OLANZapine (ZYPREXA) 5 MG tablet Take 1 tablet (5 mg) by mouth At Bedtime Continue until your nausea resolves 30 tablet 1     omeprazole (PRILOSEC) 20 MG DR capsule Take 1 capsule (20 mg) by mouth 2 times daily 60 capsule 1     ondansetron (ZOFRAN) 4 MG tablet Take 1-2 tablets (4-8 mg) by mouth every 8 hours as needed for nausea 40 tablet 1     phosphorus tablet 250 mg (PHOSPHA 250 NEUTRAL) 250 MG per tablet Take 2 tablets (500 mg) by mouth 3 times daily 90 tablet 1     potassium chloride ER (K-TAB) 20 MEQ CR tablet Take 2 tablets (40 mEq) by mouth daily 30 tablet 1     prochlorperazine (COMPAZINE) 5 MG tablet Take 1 tablet (5 mg) by mouth every 6 hours as needed for nausea or vomiting . Caution: causes sedation. 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     Thyroid disease     removed pituitary gland     Past Surgical History:   Procedure Laterality Date     COLONOSCOPY N/A 12/17/2020    Procedure: COLONOSCOPY;  Surgeon: Ken Camacho MD;  Location: [facility]     ENT SURGERY       HERNIA REPAIR       LARYNGOSCOPY, EXCISE VOCAL CORD LESION MICROSCOPIC, COMBINED Left 07/01/2021    Procedure: MICROLARYNGOSCOPY, LEFT TRUE VOCAL CORD INJECTION WITH PROLARYN;  Surgeon: Kyree Bearden MD;  Location: [facility] OR     PHACOEMULSIFICATION WITH STANDARD INTRAOCULAR LENS IMPLANT Right 03/10/2021    Procedure: Cataract removal with implant.;  Surgeon: Jamir Mac MD;  Location: [facility] OR     PHACOEMULSIFICATION WITH STANDARD INTRAOCULAR LENS IMPLANT Left 04/05/2021    Procedure: Cataract  removal with implant.;  Surgeon: Jamir Mac MD;  Location: [facility] OR     PICC DOUBLE LUMEN PLACEMENT Right 12/01/2021    5FR DL PICC, basilic vein. L-38cm, 1cm out.     PITUITARY EXCISION       tooth pulled 4/7  Right      Allergies   Allergen Reactions     Penicillins Hives and Swelling            Social History   Social History     Tobacco Use     Smoking status: Never Smoker     Smokeless tobacco: Never Used   Substance Use Topics     Alcohol use: Yes     Comment: rare     Drug use: Never      Past medical history and social history were reviewed.    Physical Examination:  There were no vitals taken for this visit.  Wt Readings from Last 10 Encounters:   12/09/21 66.7 kg (147 lb)   12/09/21 67 kg (147 lb 11.2 oz)   12/08/21 64.8 kg (142 lb 13.7 oz)   11/12/21 68 kg (150 lb)   11/11/21 69.9 kg (154 lb)   11/04/21 70 kg (154 lb 6.4 oz)   11/02/21 85.9 kg (189 lb 6.4 oz)   09/17/21 73 kg (161 lb)   09/01/21 72.6 kg (160 lb)   08/11/21 72.6 kg (160 lb)     Video physical exam  General: Patient appears well in no acute distress.   Skin: No visualized rash or lesions on visualized skin  Eyes: EOMI, no erythema, sclera icterus or discharge noted  Resp: Appears to be breathing comfortably without accessory muscle usage, speaking in full sentences, no cough  MSK: Appears to have normal range of motion based on visualized movements  Neurologic: No apparent tremors, facial movements symmetric  Psych: affect normal, alert and oriented    The rest of a comprehensive physical examination is deferred due to PHE (public health emergency) video restrictions    Laboratory Data:  Results for MARISOL XIE (MRN [id number]) as of 12/29/2021 14:09   12/27/2021 14:28   Sodium 143   Potassium 4.1   Chloride 109   Carbon Dioxide 30   Urea Nitrogen 13   Creatinine 0.83   GFR Estimate >90   Calcium 8.5   Anion Gap 4   Magnesium 2.1   Phosphorus 3.7   Albumin 3.0 (L)   Protein Total 6.6 (L)   Bilirubin Total 0.2   Alkaline  Phosphatase 240 (H)   ALT 46   AST 31   Glucose 96   WBC 12.3 (H)   Hemoglobin 9.4 (L)   Hematocrit 30.9 (L)   Platelet Count 298   RBC Count 3.39 (L)   MCV 91   MCH 27.7   MCHC 30.4 (L)   RDW 18.2 (H)   % Neutrophils 81   % Lymphocytes 8   % Monocytes 7   % Eosinophils 0   % Basophils 3   % Myelocytes 1   Absolute Basophils 0.4 (H)   NRBC/W 1 (H)   Absolute Neutrophil 10.0 (H)   Absolute Lymphocytes 1.0   Absolute Monocytes 0.9   Absolute Eosinophils 0.0   Absolute Myelocytes 0.1 (H)   Absolute NRBCs 0.1 (H)   RBC Morphology Confirmed RBC Indices   Platelet Morphology Automated Count Confirmed. Platelet morphology is normal.       Assessment and Plan:  1. 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 High PD-L1. Was on Keytruda but had progression, now on Doxil + Ifos started 10/26/21.     Did not tolerate well-had significant mucositis, poor PO intake, nausea, and electrolyte derrangements. He has had less cancer pain and CT with stable disease. Cycle 2 given 12/1/21 with 10% dose reduction.     Doing better this round. Labs reviewed and recovered. Will plan for cycle 3 next week with same doses as cycle 2- ice hands and mouth during Doxil. Will need PICC and COVID test-ordered.    He is interested in port -will plan prior to cycle 4.      Repeat imaging after cycle 4.      2. GI  Dyspepsia: Continue Omeprazole 40mg daily (better than BID per patient). Continue Tums PRN     CINV: Continue Zofran PRN and Zyprexa at bedtime. Continue Compazine and Lorazepam PRN. Continue Emend inpatient on day 1 and day 6.      LFT elevation: 2/2 statin, improving     Gas: Continue Gas-x PRN      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 Cal Saba endocrine.     Hypothyroidism continue Synthroid 75mcg daily.      4. Derm  Rosacea: Long standing issue, continue Metrogel PRN. No new skin concerns.     Hand/foot: 2/2 Doxil, more bothersome this cycle. Continue  creams at home and avoiding irritants. Will also ice hands with next infusion of Doxil.      5. MSK  Left shoulder/back/chest wall pain 2/2 tumor, following with palliative. Continue topical Beng[place] and PO Celebrex BID. Pain improved.     Monitor tingling/neuropathy. Minimal.      6. ENT  Hoarseness: Thought 2/2 mediastinal mass vs irritation from prior biopsy. Following with ENT, s/p vocal cord infection 7/1/21. Will see our voice clinic in Dec due to ongoing issues. This is improved.     Mucositis: Significant, 2/2 Doxil. Better with dose reduction. Continue salt/soda rinses. Will make sure he has Nystatin for next cycle and ice mouth during treatment.      7. Pulm/Cards  Dyspnea with talking, prior work-up with CT PE negative for PE, infection, pneumonitis; troponin negative; COVID negative. Sating well on RA. Agree with speech pathology thoughts on laryngeal dysfunction. Symptoms improved.       Continue Guaifenesin-[place] PRN for cough, much improved.      Saw cardiology, echo WNL. Has mild CAD, recommended to start on statin however now HELD for LFT elevation, improving.      8. FEN  Hypokalemia: 2/2 Ifos and poor PO intake. Improving. Cut back potassium to 20meq daily. Recheck later this week     Hypophosphatemia: 2/2 ifos and poor PO intake. Improving. Cut back phos to 500mg BID. Recheck later this week     Poor PO intake: 2/2 mucositis and nausea as above. Improved now, good appetite and eating well.      Dehydration: Twice weekly fluids at [place]. Elevate legs and wear compression to prevent additional swelling.  Will continue this with next cycle.      9. Psych  Anxiety/depression: Hx of issues, recurrent issue when he was feeling poorly from treatment. Now that he is physically doing better his mood is much improved.     30 minutes spent on the date of the encounter doing chart review, review of test results, interpretation of tests, patient visit, documentation and discussion with other provider(s)  Diego RNCC.    Maynor Rios PA-C  Department of Hematology and Oncology  [facility]

## 2021-12-28 ENCOUNTER — VIRTUAL VISIT (OUTPATIENT)
Dept: ONCOLOGY | Facility: CLINIC | Age: 71
End: 2021-12-28
Attending: PHYSICIAN ASSISTANT
Payer: MEDICARE

## 2021-12-28 DIAGNOSIS — C49.9 SARCOMA (H): Primary | ICD-10-CM

## 2021-12-28 DIAGNOSIS — C49.9 SPINDLE CELL SARCOMA (H): ICD-10-CM

## 2021-12-28 DIAGNOSIS — E87.6 HYPOKALEMIA: ICD-10-CM

## 2021-12-28 DIAGNOSIS — Z51.89 ENCOUNTER FOR OTHER SPECIFIED AFTERCARE: ICD-10-CM

## 2021-12-28 DIAGNOSIS — T45.1X5A CHEMOTHERAPY-INDUCED NEUTROPENIA (H): ICD-10-CM

## 2021-12-28 DIAGNOSIS — E83.39 HYPOPHOSPHATEMIA: ICD-10-CM

## 2021-12-28 DIAGNOSIS — C45.9 MESOTHELIOMA, MALIGNANT (H): ICD-10-CM

## 2021-12-28 DIAGNOSIS — D70.1 CHEMOTHERAPY-INDUCED NEUTROPENIA (H): ICD-10-CM

## 2021-12-28 PROCEDURE — 99214 OFFICE O/P EST MOD 30 MIN: CPT | Mod: 95 | Performed by: PHYSICIAN ASSISTANT

## 2021-12-28 PROCEDURE — G0463 HOSPITAL OUTPT CLINIC VISIT: HCPCS | Mod: PN,RTG | Performed by: PHYSICIAN ASSISTANT

## 2021-12-28 RX ORDER — POTASSIUM CHLORIDE 1500 MG/1
20 TABLET, EXTENDED RELEASE ORAL DAILY
Qty: 30 TABLET | Refills: 1 | COMMUNITY
Start: 2021-12-28 | End: 2022-01-12

## 2021-12-28 NOTE — LETTER
12/28/2021         RE: Ifrah Huitron  [address] [place] [address]        Dear Colleague,    Thank you for referring your patient, Ifrah Huitron, to the [facility]. Please see a copy of my visit note below.    Ifrah is a 71 year old who is being evaluated via a billable video visit.      How would you like to obtain your AVS? MyChart  If the video visit is dropped, the invitation should be resent by: Text to cell phone: [phone]  Will anyone else be joining your video visit? Emma      Malini Claros    Video Start Time: 11:01am    Video-Visit Details    Type of service:  Video Visit    Video End Time: 11:22am    Originating Location (pt. Location): Home    Distant Location (provider location):  [facility]     Platform used for Video Visit: Intergloss     Oncology/Hematology Visit Note  Dec 28, 2021    Reason for Visit: Follow up of spindle cell sarcoma     History of Present Illness: Ifrah Huitron is a 71 year old male with PMH rosacea, pituitary macroadenoma s/p resection, GERD, kidney stones, DJD with metastatic spindle cell sarcoma. He presented to his PCP March 2021 with chest pain/left shoulder and back pain that led to imaging which revealed multiple lung mets. There were difficulties in getting diagnostic biopsy, eventually biopsy of mediastinal mass with spindle cell sarcoma. There was high PD-L1 expression (90%). Baseline imaging 6/21/21 with progression of lung mets and left-sided subdiaphragmatic mass, stable liver lesions. 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      He started Keytruda 6/21/21. CT 8/2/21 with positive response to treatment. CT 10/18/21 with mixed response- LUQ mass larger, anterior mediastinal and left anterior pleural mass smaller. Keytruda stopped.     Started on Doxil + Ifosfamide 10/26/21. Poorly tolerated with mucositis,  "nausea, poor oral intake. CT with stable to positive response.      Received C2 12/1/21 (delayed for tolerance issues) with 10% dose reduction.    Interval History:  Ifrah was called today for follow-up. He is feeling really well. His only concern is mild pain and peeling in his finger tips that is slowly improving. No other skin concerns. Mouth sores resolved. Eating and drinking well. He is interested in getting a port but wants to make sure he can golf.    He denies fevers, headaches, dizziness, chest pain, SOB, cough, nausea, vomiting, abdominal pain, bowel concerns, bladder issues. Neuropathy is slight but improving. Has mild leg swelling. Overall this is the best he has felt in months. No cancer pain.     Current Outpatient Medications   Medication Sig Dispense Refill     acetaminophen (TYLENOL) 500 MG tablet Take 500-1,000 mg by mouth every 6 hours as needed for mild pain       calcium carbonate (TUMS) 500 MG chewable tablet Take 1 tablet (500 mg) by mouth 3 times daily as needed for heartburn       celecoxib (CELEBREX) 200 MG capsule Take 1 capsule (200 mg) by mouth 2 times daily. Note this is a new a strength! Only one capsule at a time! 60 capsule 3     cholecalciferol (VITAMIN D-1000 MAX ST) 1000 units TABS Take 1,000 Units by mouth daily        doxepin (SINEQUAN) 10 MG/ML (HIGH CONC) solution Take 2 mLs (20 mg) by mouth every 4 hours as needed for other (mouth pain) . Mix with equal parts tap water. Swish and hold in mouth ~60 seconds then spit out. 118 mL 3     guaiFENesin-codeine (GUAIFENESIN AC) 100-10 MG/5ML syrup Take 10 mLs by mouth every 4 hours as needed for cough 118 mL 0     hydrocortisone (CORTEF) 10 MG tablet Take 20 mg in the morning and 10 mg in the afternoon       Insulin Syringe-Needle U-100 27.5G X 5/8\" 2 ML MISC 1 each daily as needed (with solucortef for adrenal crisis) 10 each 1     levothyroxine (SYNTHROID/LEVOTHROID) 75 MCG tablet Take 75 mcg by mouth every morning        LORazepam " (ATIVAN) 0.5 MG tablet Take 1 tablet (0.5 mg) by mouth every 4 hours as needed for nausea or vomiting . Caution: causes sedation. 30 tablet 0     Menthol-Methyl Salicylate (DALIA MALIK GREASELESS) cream Apply topically every 6 hours as needed       metroNIDAZOLE (METROGEL) 0.75 % external gel Apply topically 2 times daily 45 g 11     nystatin (MYCOSTATIN) 483530 UNIT/ML suspension Take 5 mLs (500,000 Units) by mouth 4 times daily . Start at first signs of white coating on tongue. 473 mL 0     OLANZapine (ZYPREXA) 5 MG tablet Take 1 tablet (5 mg) by mouth At Bedtime Continue until your nausea resolves 30 tablet 1     omeprazole (PRILOSEC) 20 MG DR capsule Take 1 capsule (20 mg) by mouth 2 times daily 60 capsule 1     ondansetron (ZOFRAN) 4 MG tablet Take 1-2 tablets (4-8 mg) by mouth every 8 hours as needed for nausea 40 tablet 1     phosphorus tablet 250 mg (PHOSPHA 250 NEUTRAL) 250 MG per tablet Take 2 tablets (500 mg) by mouth 3 times daily 90 tablet 1     potassium chloride ER (K-TAB) 20 MEQ CR tablet Take 2 tablets (40 mEq) by mouth daily 30 tablet 1     prochlorperazine (COMPAZINE) 5 MG tablet Take 1 tablet (5 mg) by mouth every 6 hours as needed for nausea or vomiting . Caution: causes sedation. 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     Thyroid disease     removed pituitary gland     Past Surgical History:   Procedure Laterality Date     COLONOSCOPY N/A 12/17/2020    Procedure: COLONOSCOPY;  Surgeon: Ken Camacho MD;  Location: [facility]     ENT SURGERY       HERNIA REPAIR       LARYNGOSCOPY, EXCISE VOCAL CORD LESION MICROSCOPIC, COMBINED Left 07/01/2021    Procedure: MICROLARYNGOSCOPY, LEFT TRUE VOCAL CORD INJECTION WITH PROLARYN;  Surgeon: Kyree Bearden MD;  Location: [facility]     PHACOEMULSIFICATION  WITH STANDARD INTRAOCULAR LENS IMPLANT Right 03/10/2021    Procedure: Cataract removal with implant.;  Surgeon: Jamir Mac MD;  Location: [facility]     PHACOEMULSIFICATION WITH STANDARD INTRAOCULAR LENS IMPLANT Left 04/05/2021    Procedure: Cataract removal with implant.;  Surgeon: Jamir Mac MD;  Location: [facility]     PICC DOUBLE LUMEN PLACEMENT Right 12/01/2021    5FR DL PICC, basilic vein. L-38cm, 1cm out.     PITUITARY EXCISION       tooth pulled 4/7  Right      Allergies   Allergen Reactions     Penicillins Hives and Swelling            Social History   Social History     Tobacco Use     Smoking status: Never Smoker     Smokeless tobacco: Never Used   Substance Use Topics     Alcohol use: Yes     Comment: rare     Drug use: Never      Past medical history and social history were reviewed.    Physical Examination:  There were no vitals taken for this visit.  Wt Readings from Last 10 Encounters:   12/09/21 66.7 kg (147 lb)   12/09/21 67 kg (147 lb 11.2 oz)   12/08/21 64.8 kg (142 lb 13.7 oz)   11/12/21 68 kg (150 lb)   11/11/21 69.9 kg (154 lb)   11/04/21 70 kg (154 lb 6.4 oz)   11/02/21 85.9 kg (189 lb 6.4 oz)   09/17/21 73 kg (161 lb)   09/01/21 72.6 kg (160 lb)   08/11/21 72.6 kg (160 lb)     Video physical exam  General: Patient appears well in no acute distress.   Skin: No visualized rash or lesions on visualized skin  Eyes: EOMI, no erythema, sclera icterus or discharge noted  Resp: Appears to be breathing comfortably without accessory muscle usage, speaking in full sentences, no cough  MSK: Appears to have normal range of motion based on visualized movements  Neurologic: No apparent tremors, facial movements symmetric  Psych: affect normal, alert and oriented    The rest of a comprehensive physical examination is deferred due to PHE (public health emergency) video restrictions    Laboratory Data:  Results for MARISOL XIE (MRN [id number]) as of 12/29/2021 14:09   12/27/2021 14:28    Sodium 143   Potassium 4.1   Chloride 109   Carbon Dioxide 30   Urea Nitrogen 13   Creatinine 0.83   GFR Estimate >90   Calcium 8.5   Anion Gap 4   Magnesium 2.1   Phosphorus 3.7   Albumin 3.0 (L)   Protein Total 6.6 (L)   Bilirubin Total 0.2   Alkaline Phosphatase 240 (H)   ALT 46   AST 31   Glucose 96   WBC 12.3 (H)   Hemoglobin 9.4 (L)   Hematocrit 30.9 (L)   Platelet Count 298   RBC Count 3.39 (L)   MCV 91   MCH 27.7   MCHC 30.4 (L)   RDW 18.2 (H)   % Neutrophils 81   % Lymphocytes 8   % Monocytes 7   % Eosinophils 0   % Basophils 3   % Myelocytes 1   Absolute Basophils 0.4 (H)   NRBC/W 1 (H)   Absolute Neutrophil 10.0 (H)   Absolute Lymphocytes 1.0   Absolute Monocytes 0.9   Absolute Eosinophils 0.0   Absolute Myelocytes 0.1 (H)   Absolute NRBCs 0.1 (H)   RBC Morphology Confirmed RBC Indices   Platelet Morphology Automated Count Confirmed. Platelet morphology is normal.       Assessment and Plan:  1. 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 High PD-L1. Was on Keytruda but had progression, now on Doxil + Ifos started 10/26/21.     Did not tolerate well-had significant mucositis, poor PO intake, nausea, and electrolyte derrangements. He has had less cancer pain and CT with stable disease. Cycle 2 given 12/1/21 with 10% dose reduction.     Doing better this round. Labs reviewed and recovered. Will plan for cycle 3 next week with same doses as cycle 2- ice hands and mouth during Doxil. Will need PICC and COVID test-ordered.    He is interested in port -will plan prior to cycle 4.      Repeat imaging after cycle 4.      2. GI  Dyspepsia: Continue Omeprazole 40mg daily (better than BID per patient). Continue Tums PRN     CINV: Continue Zofran PRN and Zyprexa at bedtime. Continue Compazine and Lorazepam PRN. Continue Emend inpatient on day 1 and day 6.      LFT elevation: 2/2 statin, improving     Gas: Continue Gas-x PRN      3. 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 Cal Saba endocrine.     Hypothyroidism continue Synthroid 75mcg daily.      4. Derm  Rosacea: Long standing issue, continue Metrogel PRN. No new skin concerns.     Hand/foot: 2/2 Doxil, more bothersome this cycle. Continue creams at home and avoiding irritants. Will also ice hands with next infusion of Doxil.      5. MSK  Left shoulder/back/chest wall pain 2/2 tumor, following with palliative. Continue topical Bengay and PO Celebrex BID. Pain improved.     Monitor tingling/neuropathy. Minimal.      6. ENT  Hoarseness: Thought 2/2 mediastinal mass vs irritation from prior biopsy. Following with ENT, s/p vocal cord infection 7/1/21. Will see our voice clinic in Dec due to ongoing issues. This is improved.     Mucositis: Significant, 2/2 Doxil. Better with dose reduction. Continue salt/soda rinses. Will make sure he has Nystatin for next cycle and ice mouth during treatment.      7. Pulm/Cards  Dyspnea with talking, prior work-up with CT PE negative for PE, infection, pneumonitis; troponin negative; COVID negative. Sating well on RA. Agree with speech pathology thoughts on laryngeal dysfunction. Symptoms improved.       Continue Guaifenesin-[place] PRN for cough, much improved.      Saw cardiology, echo WNL. Has mild CAD, recommended to start on statin however now HELD for LFT elevation, improving.      8. FEN  Hypokalemia: 2/2 Ifos and poor PO intake. Improving. Cut back potassium to 20meq daily. Recheck later this week     Hypophosphatemia: 2/2 ifos and poor PO intake. Improving. Cut back phos to 500mg BID. Recheck later this week     Poor PO intake: 2/2 mucositis and nausea as above. Improved now, good appetite and eating well.      Dehydration: Twice weekly fluids at [place]. Elevate legs and wear compression to prevent additional swelling.  Will continue this with next cycle.      9. Psych  Anxiety/depression: Hx of issues, recurrent issue when he was feeling poorly from  treatment. Now that he is physically doing better his mood is much improved.     30 minutes spent on the date of the encounter doing chart review, review of test results, interpretation of tests, patient visit, documentation and discussion with other provider(s) -Luiz CHASE.    Maynor Rios PA-C  Department of Hematology and Oncology  [facility]         Again, thank you for allowing me to participate in the care of your patient.        Sincerely,        GERALDO Mullins

## 2021-12-28 NOTE — LETTER
12/28/2021         RE: Ifrah Huitron  [address] [place] [address]        Dear Colleague,    Thank you for referring your patient, Ifrah Huitron, to the [facility]. Please see a copy of my visit note below.    Ifrah is a 71 year old who is being evaluated via a billable video visit.      How would you like to obtain your AVS? MyChart  If the video visit is dropped, the invitation should be resent by: Text to cell phone: [phone]  Will anyone else be joining your video visit? Emma      Malini Claros    Video Start Time: 11:01am    Video-Visit Details    Type of service:  Video Visit    Video End Time: 11:22am    Originating Location (pt. Location): Home    Distant Location (provider location):  [facility]     Platform used for Video Visit: [employer]     Oncology/Hematology Visit Note  Dec 28, 2021    Reason for Visit: Follow up of spindle cell sarcoma     History of Present Illness: Ifrah Huitron is a 71 year old male with PMH rosacea, pituitary macroadenoma s/p resection, GERD, kidney stones, DJD with metastatic spindle cell sarcoma. He presented to his PCP March 2021 with chest pain/left shoulder and back pain that led to imaging which revealed multiple lung mets. There were difficulties in getting diagnostic biopsy, eventually biopsy of mediastinal mass with spindle cell sarcoma. There was high PD-L1 expression (90%). Baseline imaging 6/21/21 with progression of lung mets and left-sided subdiaphragmatic mass, stable liver lesions. 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      He started Keytruda 6/21/21. CT 8/2/21 with positive response to treatment. CT 10/18/21 with mixed response- LUQ mass larger, anterior mediastinal and left anterior pleural mass smaller. Keytruda stopped.     Started on Doxil + Ifosfamide 10/26/21. Poorly tolerated with mucositis,  "nausea, poor oral intake. CT with stable to positive response.      Received C2 12/1/21 (delayed for tolerance issues) with 10% dose reduction.    Interval History:  Ifrah was called today for follow-up. He is feeling really well. His only concern is mild pain and peeling in his finger tips that is slowly improving. No other skin concerns. Mouth sores resolved. Eating and drinking well. He is interested in getting a port but wants to make sure he can golf.    He denies fevers, headaches, dizziness, chest pain, SOB, cough, nausea, vomiting, abdominal pain, bowel concerns, bladder issues. Neuropathy is slight but improving. Has mild leg swelling. Overall this is the best he has felt in months. No cancer pain.     Current Outpatient Medications   Medication Sig Dispense Refill     acetaminophen (TYLENOL) 500 MG tablet Take 500-1,000 mg by mouth every 6 hours as needed for mild pain       calcium carbonate (TUMS) 500 MG chewable tablet Take 1 tablet (500 mg) by mouth 3 times daily as needed for heartburn       celecoxib (CELEBREX) 200 MG capsule Take 1 capsule (200 mg) by mouth 2 times daily. Note this is a new a strength! Only one capsule at a time! 60 capsule 3     cholecalciferol (VITAMIN D-1000 MAX ST) 1000 units TABS Take 1,000 Units by mouth daily        doxepin (SINEQUAN) 10 MG/ML (HIGH CONC) solution Take 2 mLs (20 mg) by mouth every 4 hours as needed for other (mouth pain) . Mix with equal parts tap water. Swish and hold in mouth ~60 seconds then spit out. 118 mL 3     guaiFENesin-codeine (GUAIFENESIN AC) 100-10 MG/5ML syrup Take 10 mLs by mouth every 4 hours as needed for cough 118 mL 0     hydrocortisone (CORTEF) 10 MG tablet Take 20 mg in the morning and 10 mg in the afternoon       Insulin Syringe-Needle U-100 27.5G X 5/8\" 2 ML MISC 1 each daily as needed (with solucortef for adrenal crisis) 10 each 1     levothyroxine (SYNTHROID/LEVOTHROID) 75 MCG tablet Take 75 mcg by mouth every morning        LORazepam " (ATIVAN) 0.5 MG tablet Take 1 tablet (0.5 mg) by mouth every 4 hours as needed for nausea or vomiting . Caution: causes sedation. 30 tablet 0     Menthol-Methyl Salicylate (DALIA MALIK GREASELESS) cream Apply topically every 6 hours as needed       metroNIDAZOLE (METROGEL) 0.75 % external gel Apply topically 2 times daily 45 g 11     nystatin (MYCOSTATIN) 326449 UNIT/ML suspension Take 5 mLs (500,000 Units) by mouth 4 times daily . Start at first signs of white coating on tongue. 473 mL 0     OLANZapine (ZYPREXA) 5 MG tablet Take 1 tablet (5 mg) by mouth At Bedtime Continue until your nausea resolves 30 tablet 1     omeprazole (PRILOSEC) 20 MG DR capsule Take 1 capsule (20 mg) by mouth 2 times daily 60 capsule 1     ondansetron (ZOFRAN) 4 MG tablet Take 1-2 tablets (4-8 mg) by mouth every 8 hours as needed for nausea 40 tablet 1     phosphorus tablet 250 mg (PHOSPHA 250 NEUTRAL) 250 MG per tablet Take 2 tablets (500 mg) by mouth 3 times daily 90 tablet 1     potassium chloride ER (K-TAB) 20 MEQ CR tablet Take 2 tablets (40 mEq) by mouth daily 30 tablet 1     prochlorperazine (COMPAZINE) 5 MG tablet Take 1 tablet (5 mg) by mouth every 6 hours as needed for nausea or vomiting . Caution: causes sedation. 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     Thyroid disease     removed pituitary gland     Past Surgical History:   Procedure Laterality Date     COLONOSCOPY N/A 12/17/2020    Procedure: COLONOSCOPY;  Surgeon: Ken Camacho MD;  Location: [facility]     ENT SURGERY       HERNIA REPAIR       LARYNGOSCOPY, EXCISE VOCAL CORD LESION MICROSCOPIC, COMBINED Left 07/01/2021    Procedure: MICROLARYNGOSCOPY, LEFT TRUE VOCAL CORD INJECTION WITH PROLARYN;  Surgeon: Kyree Bearden MD;  Location: [facility]     PHACOEMULSIFICATION  WITH STANDARD INTRAOCULAR LENS IMPLANT Right 03/10/2021    Procedure: Cataract removal with implant.;  Surgeon: Jamir Mac MD;  Location: [facility]     PHACOEMULSIFICATION WITH STANDARD INTRAOCULAR LENS IMPLANT Left 04/05/2021    Procedure: Cataract removal with implant.;  Surgeon: Jamir Mac MD;  Location: [facility]     PICC DOUBLE LUMEN PLACEMENT Right 12/01/2021    5FR DL PICC, basilic vein. L-38cm, 1cm out.     PITUITARY EXCISION       tooth pulled 4/7  Right      Allergies   Allergen Reactions     Penicillins Hives and Swelling            Social History   Social History     Tobacco Use     Smoking status: Never Smoker     Smokeless tobacco: Never Used   Substance Use Topics     Alcohol use: Yes     Comment: rare     Drug use: Never      Past medical history and social history were reviewed.    Physical Examination:  There were no vitals taken for this visit.  Wt Readings from Last 10 Encounters:   12/09/21 66.7 kg (147 lb)   12/09/21 67 kg (147 lb 11.2 oz)   12/08/21 64.8 kg (142 lb 13.7 oz)   11/12/21 68 kg (150 lb)   11/11/21 69.9 kg (154 lb)   11/04/21 70 kg (154 lb 6.4 oz)   11/02/21 85.9 kg (189 lb 6.4 oz)   09/17/21 73 kg (161 lb)   09/01/21 72.6 kg (160 lb)   08/11/21 72.6 kg (160 lb)     Video physical exam  General: Patient appears well in no acute distress.   Skin: No visualized rash or lesions on visualized skin  Eyes: EOMI, no erythema, sclera icterus or discharge noted  Resp: Appears to be breathing comfortably without accessory muscle usage, speaking in full sentences, no cough  MSK: Appears to have normal range of motion based on visualized movements  Neurologic: No apparent tremors, facial movements symmetric  Psych: affect normal, alert and oriented    The rest of a comprehensive physical examination is deferred due to PHE (public health emergency) video restrictions    Laboratory Data:  Results for MARISOL XIE (MRN [id number]) as of 12/29/2021 14:09   12/27/2021 14:28    Sodium 143   Potassium 4.1   Chloride 109   Carbon Dioxide 30   Urea Nitrogen 13   Creatinine 0.83   GFR Estimate >90   Calcium 8.5   Anion Gap 4   Magnesium 2.1   Phosphorus 3.7   Albumin 3.0 (L)   Protein Total 6.6 (L)   Bilirubin Total 0.2   Alkaline Phosphatase 240 (H)   ALT 46   AST 31   Glucose 96   WBC 12.3 (H)   Hemoglobin 9.4 (L)   Hematocrit 30.9 (L)   Platelet Count 298   RBC Count 3.39 (L)   MCV 91   MCH 27.7   MCHC 30.4 (L)   RDW 18.2 (H)   % Neutrophils 81   % Lymphocytes 8   % Monocytes 7   % Eosinophils 0   % Basophils 3   % Myelocytes 1   Absolute Basophils 0.4 (H)   NRBC/W 1 (H)   Absolute Neutrophil 10.0 (H)   Absolute Lymphocytes 1.0   Absolute Monocytes 0.9   Absolute Eosinophils 0.0   Absolute Myelocytes 0.1 (H)   Absolute NRBCs 0.1 (H)   RBC Morphology Confirmed RBC Indices   Platelet Morphology Automated Count Confirmed. Platelet morphology is normal.       Assessment and Plan:  1. 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 High PD-L1. Was on Keytruda but had progression, now on Doxil + Ifos started 10/26/21.     Did not tolerate well-had significant mucositis, poor PO intake, nausea, and electrolyte derrangements. He has had less cancer pain and CT with stable disease. Cycle 2 given 12/1/21 with 10% dose reduction.     Doing better this round. Labs reviewed and recovered. Will plan for cycle 3 next week with same doses as cycle 2- ice hands and mouth during Doxil. Will need PICC and COVID test-ordered.    He is interested in port -will plan prior to cycle 4.      Repeat imaging after cycle 4.      2. GI  Dyspepsia: Continue Omeprazole 40mg daily (better than BID per patient). Continue Tums PRN     CINV: Continue Zofran PRN and Zyprexa at bedtime. Continue Compazine and Lorazepam PRN. Continue Emend inpatient on day 1 and day 6.      LFT elevation: 2/2 statin, improving     Gas: Continue Gas-x PRN      3. 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 Cal Saba endocrine.     Hypothyroidism continue Synthroid 75mcg daily.      4. Derm  Rosacea: Long standing issue, continue Metrogel PRN. No new skin concerns.     Hand/foot: 2/2 Doxil, more bothersome this cycle. Continue creams at home and avoiding irritants. Will also ice hands with next infusion of Doxil.      5. MSK  Left shoulder/back/chest wall pain 2/2 tumor, following with palliative. Continue topical Bengay and PO Celebrex BID. Pain improved.     Monitor tingling/neuropathy. Minimal.      6. ENT  Hoarseness: Thought 2/2 mediastinal mass vs irritation from prior biopsy. Following with ENT, s/p vocal cord infection 7/1/21. Will see our voice clinic in Dec due to ongoing issues. This is improved.     Mucositis: Significant, 2/2 Doxil. Better with dose reduction. Continue salt/soda rinses. Will make sure he has Nystatin for next cycle and ice mouth during treatment.      7. Pulm/Cards  Dyspnea with talking, prior work-up with CT PE negative for PE, infection, pneumonitis; troponin negative; COVID negative. Sating well on RA. Agree with speech pathology thoughts on laryngeal dysfunction. Symptoms improved.       Continue Guaifenesin-Coedine PRN for cough, much improved.      Saw cardiology, echo WNL. Has mild CAD, recommended to start on statin however now HELD for LFT elevation, improving.      8. FEN  Hypokalemia: 2/2 Ifos and poor PO intake. Improving. Cut back potassium to 20meq daily. Recheck later this week     Hypophosphatemia: 2/2 ifos and poor PO intake. Improving. Cut back phos to 500mg BID. Recheck later this week     Poor PO intake: 2/2 mucositis and nausea as above. Improved now, good appetite and eating well.      Dehydration: Twice weekly fluids at [place]. Elevate legs and wear compression to prevent additional swelling.  Will continue this with next cycle.      9. Psych  Anxiety/depression: Hx of issues, recurrent issue when he was feeling poorly from  treatment. Now that he is physically doing better his mood is much improved.     30 minutes spent on the date of the encounter doing chart review, review of test results, interpretation of tests, patient visit, documentation and discussion with other provider(s) -Luiz CHASE.    Maynor Rios PA-C  Department of Hematology and Oncology  [facility]         Again, thank you for allowing me to participate in the care of your patient.        Sincerely,        GERALDO Mullins

## 2021-12-29 DIAGNOSIS — B37.0 THRUSH: ICD-10-CM

## 2021-12-29 RX ORDER — NALOXONE HYDROCHLORIDE 0.4 MG/ML
0.2 INJECTION, SOLUTION INTRAMUSCULAR; INTRAVENOUS; SUBCUTANEOUS
Status: CANCELLED | OUTPATIENT
Start: 2022-01-03

## 2021-12-29 RX ORDER — DEXTROSE MONOHYDRATE 50 MG/ML
10-20 INJECTION, SOLUTION INTRAVENOUS
Status: CANCELLED | OUTPATIENT
Start: 2022-01-10

## 2021-12-29 RX ORDER — NYSTATIN 100000/ML
500000 SUSPENSION, ORAL (FINAL DOSE FORM) ORAL 4 TIMES DAILY
Qty: 473 ML | Refills: 0 | Status: ON HOLD | OUTPATIENT
Start: 2021-12-29 | End: 2022-01-11

## 2021-12-29 RX ORDER — ONDANSETRON 4 MG/1
8 TABLET, FILM COATED ORAL EVERY 8 HOURS
Status: CANCELLED | OUTPATIENT
Start: 2022-01-03

## 2021-12-29 RX ORDER — MEPERIDINE HYDROCHLORIDE 25 MG/ML
25 INJECTION INTRAMUSCULAR; INTRAVENOUS; SUBCUTANEOUS EVERY 30 MIN PRN
Status: CANCELLED | OUTPATIENT
Start: 2022-01-03

## 2021-12-29 RX ORDER — METHYLPREDNISOLONE SODIUM SUCCINATE 125 MG/2ML
125 INJECTION, POWDER, LYOPHILIZED, FOR SOLUTION INTRAMUSCULAR; INTRAVENOUS
Status: CANCELLED | OUTPATIENT
Start: 2022-01-03

## 2021-12-29 RX ORDER — PROCHLORPERAZINE MALEATE 5 MG
5 TABLET ORAL EVERY 6 HOURS PRN
Status: CANCELLED | OUTPATIENT
Start: 2022-01-03

## 2021-12-29 RX ORDER — DIPHENHYDRAMINE HYDROCHLORIDE 50 MG/ML
50 INJECTION INTRAMUSCULAR; INTRAVENOUS
Status: CANCELLED | OUTPATIENT
Start: 2022-01-03

## 2021-12-29 RX ORDER — EPINEPHRINE 1 MG/ML
0.3 INJECTION, SOLUTION, CONCENTRATE INTRAVENOUS EVERY 5 MIN PRN
Status: CANCELLED | OUTPATIENT
Start: 2022-01-03

## 2021-12-29 RX ORDER — ALBUTEROL SULFATE 0.83 MG/ML
2.5 SOLUTION RESPIRATORY (INHALATION)
Status: CANCELLED | OUTPATIENT
Start: 2022-01-03

## 2021-12-29 RX ORDER — ALBUTEROL SULFATE 90 UG/1
1-2 AEROSOL, METERED RESPIRATORY (INHALATION)
Status: CANCELLED | OUTPATIENT
Start: 2022-01-03

## 2021-12-30 ENCOUNTER — APPOINTMENT (OUTPATIENT)
Dept: LAB | Facility: CLINIC | Age: 71
End: 2021-12-30
Payer: MEDICARE

## 2021-12-30 ENCOUNTER — INFUSION THERAPY VISIT (OUTPATIENT)
Dept: INFUSION THERAPY | Facility: CLINIC | Age: 71
End: 2021-12-30
Attending: PHYSICIAN ASSISTANT
Payer: MEDICARE

## 2021-12-30 DIAGNOSIS — C49.9 SPINDLE CELL SARCOMA (H): ICD-10-CM

## 2021-12-30 LAB
ALBUMIN SERPL-MCNC: 2.8 G/DL (ref 3.4–5)
ALP SERPL-CCNC: 227 U/L (ref 40–150)
ALT SERPL W P-5'-P-CCNC: 36 U/L (ref 0–70)
ANION GAP SERPL CALCULATED.3IONS-SCNC: 8 MMOL/L (ref 3–14)
AST SERPL W P-5'-P-CCNC: 22 U/L (ref 0–45)
BASOPHILS # BLD MANUAL: 0.3 10E3/UL (ref 0–0.2)
BASOPHILS NFR BLD MANUAL: 3 %
BILIRUB SERPL-MCNC: 0.2 MG/DL (ref 0.2–1.3)
BUN SERPL-MCNC: 13 MG/DL (ref 7–30)
CALCIUM SERPL-MCNC: 8.7 MG/DL (ref 8.5–10.1)
CHLORIDE BLD-SCNC: 108 MMOL/L (ref 94–109)
CO2 SERPL-SCNC: 27 MMOL/L (ref 20–32)
CREAT SERPL-MCNC: 0.78 MG/DL (ref 0.66–1.25)
EOSINOPHIL # BLD MANUAL: 0 10E3/UL (ref 0–0.7)
EOSINOPHIL NFR BLD MANUAL: 0 %
ERYTHROCYTE [DISTWIDTH] IN BLOOD BY AUTOMATED COUNT: 18.6 % (ref 10–15)
GFR SERPL CREATININE-BSD FRML MDRD: >90 ML/MIN/1.73M2
GLUCOSE BLD-MCNC: 132 MG/DL (ref 70–99)
HCT VFR BLD AUTO: 31.9 % (ref 40–53)
HGB BLD-MCNC: 10 G/DL (ref 13.3–17.7)
LYMPHOCYTES # BLD MANUAL: 1.2 10E3/UL (ref 0.8–5.3)
LYMPHOCYTES NFR BLD MANUAL: 11 %
MAGNESIUM SERPL-MCNC: 2.2 MG/DL (ref 1.6–2.3)
MCH RBC QN AUTO: 28.4 PG (ref 26.5–33)
MCHC RBC AUTO-ENTMCNC: 31.3 G/DL (ref 31.5–36.5)
MCV RBC AUTO: 91 FL (ref 78–100)
METAMYELOCYTES # BLD MANUAL: 0.1 10E3/UL
METAMYELOCYTES NFR BLD MANUAL: 1 %
MONOCYTES # BLD MANUAL: 0.6 10E3/UL (ref 0–1.3)
MONOCYTES NFR BLD MANUAL: 6 %
MYELOCYTES # BLD MANUAL: 0.1 10E3/UL
MYELOCYTES NFR BLD MANUAL: 1 %
NEUTROPHILS # BLD MANUAL: 8.4 10E3/UL (ref 1.6–8.3)
NEUTROPHILS NFR BLD MANUAL: 78 %
PHOSPHATE SERPL-MCNC: 2.6 MG/DL (ref 2.5–4.5)
PLAT MORPH BLD: ABNORMAL
PLATELET # BLD AUTO: 300 10E3/UL (ref 150–450)
POTASSIUM BLD-SCNC: 3.9 MMOL/L (ref 3.4–5.3)
PROT SERPL-MCNC: 6.6 G/DL (ref 6.8–8.8)
RBC # BLD AUTO: 3.52 10E6/UL (ref 4.4–5.9)
RBC MORPH BLD: ABNORMAL
SODIUM SERPL-SCNC: 143 MMOL/L (ref 133–144)
WBC # BLD AUTO: 10.8 10E3/UL (ref 4–11)

## 2021-12-30 PROCEDURE — 85027 COMPLETE CBC AUTOMATED: CPT | Performed by: PHYSICIAN ASSISTANT

## 2021-12-30 PROCEDURE — 80053 COMPREHEN METABOLIC PANEL: CPT | Performed by: PHYSICIAN ASSISTANT

## 2021-12-30 PROCEDURE — 83735 ASSAY OF MAGNESIUM: CPT | Performed by: PHYSICIAN ASSISTANT

## 2021-12-30 PROCEDURE — 84100 ASSAY OF PHOSPHORUS: CPT | Performed by: PHYSICIAN ASSISTANT

## 2021-12-30 PROCEDURE — 36415 COLL VENOUS BLD VENIPUNCTURE: CPT

## 2021-12-30 NOTE — PATIENT INSTRUCTIONS
Spoke with patient 12/30 regarding port placement and he would like this done at [place]. Ports are placed there through general surgery, not interventional radiology. I left them a voicemail ([phone]) to reach out patient to schedule to procedure. ROXANN

## 2021-12-30 NOTE — PROGRESS NOTES
Pt did not require and IVF's today - per the parameters in therapy plan. Pt given this information while he was in the lobby - was not seen in the infusion clinic. Maria Simon RN

## 2021-12-31 LAB
BASOPHILS # BLD MANUAL: 0.1 10E3/UL (ref 0–0.2)
BASOPHILS NFR BLD MANUAL: 2 %
EOSINOPHIL # BLD MANUAL: 0 10E3/UL (ref 0–0.7)
EOSINOPHIL NFR BLD MANUAL: 0 %
LYMPHOCYTES # BLD MANUAL: 0.7 10E3/UL (ref 0.8–5.3)
LYMPHOCYTES NFR BLD MANUAL: 24 %
MONOCYTES # BLD MANUAL: 0.2 10E3/UL (ref 0–1.3)
MONOCYTES NFR BLD MANUAL: 6 %
NEUTROPHILS # BLD MANUAL: 2.1 10E3/UL (ref 1.6–8.3)
NEUTROPHILS NFR BLD MANUAL: 68 %
PATH REV: ABNORMAL
PLAT MORPH BLD: ABNORMAL
RBC MORPH BLD: ABNORMAL
TOXIC GRANULES BLD QL SMEAR: PRESENT

## 2022-01-01 ENCOUNTER — LAB (OUTPATIENT)
Dept: INFUSION THERAPY | Facility: CLINIC | Age: 72
End: 2022-01-01
Attending: FAMILY MEDICINE
Payer: MEDICARE

## 2022-01-01 ENCOUNTER — TELEPHONE (OUTPATIENT)
Dept: ENDOCRINOLOGY | Facility: CLINIC | Age: 72
End: 2022-01-01

## 2022-01-01 ENCOUNTER — LAB (OUTPATIENT)
Dept: INFUSION THERAPY | Facility: CLINIC | Age: 72
End: 2022-01-01
Attending: PHYSICIAN ASSISTANT
Payer: MEDICARE

## 2022-01-01 ENCOUNTER — ANESTHESIA (OUTPATIENT)
Dept: SURGERY | Facility: HOSPITAL | Age: 72
DRG: 418 | End: 2022-01-01
Payer: MEDICARE

## 2022-01-01 ENCOUNTER — HOSPITAL ENCOUNTER (OUTPATIENT)
Dept: ULTRASOUND IMAGING | Facility: CLINIC | Age: 72
Discharge: HOME | End: 2022-11-30
Attending: PHYSICIAN ASSISTANT
Payer: MEDICARE

## 2022-01-01 ENCOUNTER — TRANSFERRED RECORDS (OUTPATIENT)
Dept: HEALTH INFORMATION MANAGEMENT | Facility: CLINIC | Age: 72
End: 2022-01-01

## 2022-01-01 ENCOUNTER — VIRTUAL VISIT (OUTPATIENT)
Dept: ONCOLOGY | Facility: CLINIC | Age: 72
End: 2022-01-01
Attending: INTERNAL MEDICINE
Payer: MEDICARE

## 2022-01-01 ENCOUNTER — ANESTHESIA EVENT (OUTPATIENT)
Dept: SURGERY | Facility: HOSPITAL | Age: 72
DRG: 418 | End: 2022-01-01
Payer: MEDICARE

## 2022-01-01 ENCOUNTER — HOSPITAL ENCOUNTER (OUTPATIENT)
Dept: CT IMAGING | Facility: CLINIC | Age: 72
Discharge: HOME | End: 2022-11-07
Attending: PHYSICIAN ASSISTANT | Admitting: PHYSICIAN ASSISTANT
Payer: MEDICARE

## 2022-01-01 ENCOUNTER — APPOINTMENT (OUTPATIENT)
Dept: LAB | Facility: CLINIC | Age: 72
End: 2022-01-01
Payer: MEDICARE

## 2022-01-01 ENCOUNTER — INFUSION THERAPY VISIT (OUTPATIENT)
Dept: INFUSION THERAPY | Facility: CLINIC | Age: 72
End: 2022-01-01
Attending: INTERNAL MEDICINE
Payer: MEDICARE

## 2022-01-01 ENCOUNTER — VIRTUAL VISIT (OUTPATIENT)
Dept: ONCOLOGY | Facility: CLINIC | Age: 72
End: 2022-01-01
Attending: PHYSICIAN ASSISTANT
Payer: MEDICARE

## 2022-01-01 ENCOUNTER — PATIENT OUTREACH (OUTPATIENT)
Dept: ONCOLOGY | Facility: CLINIC | Age: 72
End: 2022-01-01

## 2022-01-01 ENCOUNTER — APPOINTMENT (OUTPATIENT)
Dept: GENERAL RADIOLOGY | Facility: CLINIC | Age: 72
End: 2022-01-01
Attending: FAMILY MEDICINE
Payer: MEDICARE

## 2022-01-01 ENCOUNTER — INFUSION THERAPY VISIT (OUTPATIENT)
Dept: INFUSION THERAPY | Facility: CLINIC | Age: 72
DRG: 682 | End: 2022-01-01
Attending: INTERNAL MEDICINE
Payer: MEDICARE

## 2022-01-01 ENCOUNTER — HOSPITAL ENCOUNTER (EMERGENCY)
Facility: CLINIC | Age: 72
Discharge: HOME | End: 2022-12-26
Attending: EMERGENCY MEDICINE | Admitting: EMERGENCY MEDICINE
Payer: MEDICARE

## 2022-01-01 ENCOUNTER — TELEPHONE (OUTPATIENT)
Dept: ONCOLOGY | Facility: CLINIC | Age: 72
End: 2022-01-01

## 2022-01-01 ENCOUNTER — MYC MEDICAL ADVICE (OUTPATIENT)
Dept: ONCOLOGY | Facility: CLINIC | Age: 72
End: 2022-01-01

## 2022-01-01 ENCOUNTER — APPOINTMENT (OUTPATIENT)
Dept: CT IMAGING | Facility: CLINIC | Age: 72
End: 2022-01-01
Attending: EMERGENCY MEDICINE
Payer: MEDICARE

## 2022-01-01 ENCOUNTER — APPOINTMENT (OUTPATIENT)
Dept: MRI IMAGING | Facility: CLINIC | Age: 72
End: 2022-01-01
Attending: EMERGENCY MEDICINE
Payer: MEDICARE

## 2022-01-01 ENCOUNTER — HOSPITAL ENCOUNTER (EMERGENCY)
Facility: CLINIC | Age: 72
Discharge: HOME | End: 2022-12-22
Attending: EMERGENCY MEDICINE | Admitting: EMERGENCY MEDICINE
Payer: MEDICARE

## 2022-01-01 ENCOUNTER — APPOINTMENT (OUTPATIENT)
Dept: LAB | Facility: CLINIC | Age: 72
DRG: 682 | End: 2022-01-01
Attending: INTERNAL MEDICINE
Payer: MEDICARE

## 2022-01-01 ENCOUNTER — ONCOLOGY VISIT (OUTPATIENT)
Dept: ONCOLOGY | Facility: CLINIC | Age: 72
DRG: 682 | End: 2022-01-01
Attending: INTERNAL MEDICINE
Payer: MEDICARE

## 2022-01-01 ENCOUNTER — HOSPITAL ENCOUNTER (OUTPATIENT)
Dept: ULTRASOUND IMAGING | Facility: CLINIC | Age: 72
Discharge: HOME | End: 2022-09-27
Attending: NURSE PRACTITIONER | Admitting: NURSE PRACTITIONER
Payer: MEDICARE

## 2022-01-01 ENCOUNTER — HOSPITAL ENCOUNTER (INPATIENT)
Facility: HOSPITAL | Age: 72
LOS: 4 days | Discharge: HOME | DRG: 872 | End: 2022-12-30
Attending: INTERNAL MEDICINE | Admitting: INTERNAL MEDICINE
Payer: MEDICARE

## 2022-01-01 ENCOUNTER — LAB (OUTPATIENT)
Dept: FAMILY MEDICINE | Facility: CLINIC | Age: 72
End: 2022-01-01
Attending: INTERNAL MEDICINE
Payer: MEDICARE

## 2022-01-01 ENCOUNTER — LAB (OUTPATIENT)
Dept: LAB | Facility: CLINIC | Age: 72
End: 2022-01-01
Payer: MEDICARE

## 2022-01-01 ENCOUNTER — APPOINTMENT (OUTPATIENT)
Dept: ULTRASOUND IMAGING | Facility: HOSPITAL | Age: 72
DRG: 872 | End: 2022-01-01
Attending: FAMILY MEDICINE
Payer: MEDICARE

## 2022-01-01 ENCOUNTER — TELEPHONE (OUTPATIENT)
Dept: SURGERY | Facility: CLINIC | Age: 72
End: 2022-01-01

## 2022-01-01 ENCOUNTER — HOSPITAL ENCOUNTER (INPATIENT)
Facility: HOSPITAL | Age: 72
LOS: 3 days | Discharge: HOME | DRG: 418 | End: 2022-12-25
Attending: INTERNAL MEDICINE | Admitting: INTERNAL MEDICINE
Payer: MEDICARE

## 2022-01-01 ENCOUNTER — VIRTUAL VISIT (OUTPATIENT)
Dept: ENDOCRINOLOGY | Facility: CLINIC | Age: 72
End: 2022-01-01
Payer: MEDICARE

## 2022-01-01 ENCOUNTER — HOSPITAL ENCOUNTER (EMERGENCY)
Facility: CLINIC | Age: 72
End: 2022-01-01
Payer: MEDICARE

## 2022-01-01 ENCOUNTER — OFFICE VISIT (OUTPATIENT)
Dept: OTOLARYNGOLOGY | Facility: CLINIC | Age: 72
End: 2022-01-01
Attending: PHYSICIAN ASSISTANT
Payer: MEDICARE

## 2022-01-01 ENCOUNTER — APPOINTMENT (OUTPATIENT)
Dept: PHYSICAL THERAPY | Facility: HOSPITAL | Age: 72
DRG: 418 | End: 2022-01-01
Attending: INTERNAL MEDICINE
Payer: MEDICARE

## 2022-01-01 ENCOUNTER — APPOINTMENT (OUTPATIENT)
Dept: RADIOLOGY | Facility: HOSPITAL | Age: 72
DRG: 418 | End: 2022-01-01
Attending: INTERNAL MEDICINE
Payer: MEDICARE

## 2022-01-01 ENCOUNTER — HOSPITAL ENCOUNTER (EMERGENCY)
Facility: CLINIC | Age: 72
Discharge: HOME | End: 2022-09-18
Attending: FAMILY MEDICINE | Admitting: FAMILY MEDICINE
Payer: MEDICARE

## 2022-01-01 ENCOUNTER — HEALTH MAINTENANCE LETTER (OUTPATIENT)
Age: 72
End: 2022-01-01

## 2022-01-01 ENCOUNTER — PATIENT OUTREACH (OUTPATIENT)
Dept: CARE COORDINATION | Facility: CLINIC | Age: 72
End: 2022-01-01

## 2022-01-01 ENCOUNTER — HOSPITAL ENCOUNTER (OUTPATIENT)
Dept: CT IMAGING | Facility: CLINIC | Age: 72
Discharge: HOME | End: 2022-10-17
Attending: PHYSICIAN ASSISTANT | Admitting: PHYSICIAN ASSISTANT
Payer: MEDICARE

## 2022-01-01 ENCOUNTER — LAB (OUTPATIENT)
Dept: LAB | Facility: CLINIC | Age: 72
End: 2022-01-01
Attending: PHYSICIAN ASSISTANT
Payer: MEDICARE

## 2022-01-01 ENCOUNTER — HOSPITAL ENCOUNTER (INPATIENT)
Facility: CLINIC | Age: 72
LOS: 1 days | Discharge: HOME | DRG: 682 | End: 2022-11-26
Attending: EMERGENCY MEDICINE | Admitting: INTERNAL MEDICINE
Payer: MEDICARE

## 2022-01-01 ENCOUNTER — HOSPITAL ENCOUNTER (OUTPATIENT)
Dept: CT IMAGING | Facility: CLINIC | Age: 72
Discharge: HOME | End: 2022-08-08
Attending: PHYSICIAN ASSISTANT | Admitting: PHYSICIAN ASSISTANT
Payer: MEDICARE

## 2022-01-01 ENCOUNTER — HOSPITAL ENCOUNTER (OUTPATIENT)
Age: 72
End: 2022-01-01
Attending: INTERNAL MEDICINE
Payer: MEDICARE

## 2022-01-01 VITALS
WEIGHT: 137.4 LBS | TEMPERATURE: 98.2 F | SYSTOLIC BLOOD PRESSURE: 144 MMHG | HEART RATE: 58 BPM | BODY MASS INDEX: 21.52 KG/M2 | DIASTOLIC BLOOD PRESSURE: 85 MMHG

## 2022-01-01 VITALS
SYSTOLIC BLOOD PRESSURE: 118 MMHG | RESPIRATION RATE: 14 BRPM | DIASTOLIC BLOOD PRESSURE: 78 MMHG | TEMPERATURE: 98.3 F | HEART RATE: 69 BPM

## 2022-01-01 VITALS
HEART RATE: 58 BPM | RESPIRATION RATE: 18 BRPM | SYSTOLIC BLOOD PRESSURE: 142 MMHG | TEMPERATURE: 97.7 F | DIASTOLIC BLOOD PRESSURE: 79 MMHG

## 2022-01-01 VITALS
DIASTOLIC BLOOD PRESSURE: 90 MMHG | WEIGHT: 133 LBS | SYSTOLIC BLOOD PRESSURE: 133 MMHG | HEART RATE: 60 BPM | BODY MASS INDEX: 20.83 KG/M2 | TEMPERATURE: 97 F

## 2022-01-01 VITALS
SYSTOLIC BLOOD PRESSURE: 134 MMHG | TEMPERATURE: 97.4 F | RESPIRATION RATE: 18 BRPM | HEART RATE: 70 BPM | DIASTOLIC BLOOD PRESSURE: 70 MMHG

## 2022-01-01 VITALS
BODY MASS INDEX: 21.19 KG/M2 | HEART RATE: 84 BPM | WEIGHT: 135 LBS | RESPIRATION RATE: 18 BRPM | DIASTOLIC BLOOD PRESSURE: 71 MMHG | SYSTOLIC BLOOD PRESSURE: 115 MMHG | OXYGEN SATURATION: 97 % | TEMPERATURE: 99.9 F | HEIGHT: 67 IN

## 2022-01-01 VITALS
HEART RATE: 73 BPM | TEMPERATURE: 98.1 F | BODY MASS INDEX: 21.88 KG/M2 | DIASTOLIC BLOOD PRESSURE: 86 MMHG | SYSTOLIC BLOOD PRESSURE: 149 MMHG | WEIGHT: 139.7 LBS

## 2022-01-01 VITALS — DIASTOLIC BLOOD PRESSURE: 82 MMHG | HEART RATE: 72 BPM | TEMPERATURE: 98.1 F | SYSTOLIC BLOOD PRESSURE: 150 MMHG

## 2022-01-01 VITALS
DIASTOLIC BLOOD PRESSURE: 88 MMHG | HEART RATE: 57 BPM | WEIGHT: 139.2 LBS | BODY MASS INDEX: 21.8 KG/M2 | TEMPERATURE: 96.1 F | SYSTOLIC BLOOD PRESSURE: 156 MMHG | RESPIRATION RATE: 18 BRPM

## 2022-01-01 VITALS
TEMPERATURE: 98.3 F | SYSTOLIC BLOOD PRESSURE: 178 MMHG | DIASTOLIC BLOOD PRESSURE: 80 MMHG | OXYGEN SATURATION: 100 % | HEART RATE: 66 BPM

## 2022-01-01 VITALS
OXYGEN SATURATION: 99 % | WEIGHT: 155.9 LBS | HEART RATE: 76 BPM | DIASTOLIC BLOOD PRESSURE: 94 MMHG | BODY MASS INDEX: 24.42 KG/M2 | SYSTOLIC BLOOD PRESSURE: 177 MMHG | TEMPERATURE: 97.5 F | RESPIRATION RATE: 18 BRPM

## 2022-01-01 VITALS
WEIGHT: 137 LBS | RESPIRATION RATE: 12 BRPM | HEART RATE: 89 BPM | DIASTOLIC BLOOD PRESSURE: 93 MMHG | OXYGEN SATURATION: 97 % | SYSTOLIC BLOOD PRESSURE: 158 MMHG | BODY MASS INDEX: 21.46 KG/M2 | TEMPERATURE: 97.8 F

## 2022-01-01 VITALS — DIASTOLIC BLOOD PRESSURE: 79 MMHG | HEART RATE: 94 BPM | SYSTOLIC BLOOD PRESSURE: 144 MMHG

## 2022-01-01 VITALS
TEMPERATURE: 96.3 F | RESPIRATION RATE: 18 BRPM | HEART RATE: 90 BPM | DIASTOLIC BLOOD PRESSURE: 96 MMHG | SYSTOLIC BLOOD PRESSURE: 169 MMHG

## 2022-01-01 VITALS — RESPIRATION RATE: 16 BRPM | HEART RATE: 73 BPM | SYSTOLIC BLOOD PRESSURE: 161 MMHG | DIASTOLIC BLOOD PRESSURE: 81 MMHG

## 2022-01-01 VITALS
TEMPERATURE: 97.7 F | SYSTOLIC BLOOD PRESSURE: 164 MMHG | DIASTOLIC BLOOD PRESSURE: 94 MMHG | HEART RATE: 77 BPM | RESPIRATION RATE: 16 BRPM

## 2022-01-01 VITALS — DIASTOLIC BLOOD PRESSURE: 78 MMHG | HEART RATE: 99 BPM | SYSTOLIC BLOOD PRESSURE: 133 MMHG | TEMPERATURE: 99.5 F

## 2022-01-01 VITALS
TEMPERATURE: 97.9 F | DIASTOLIC BLOOD PRESSURE: 82 MMHG | SYSTOLIC BLOOD PRESSURE: 152 MMHG | BODY MASS INDEX: 23.49 KG/M2 | OXYGEN SATURATION: 97 % | RESPIRATION RATE: 18 BRPM | HEART RATE: 92 BPM | WEIGHT: 150 LBS

## 2022-01-01 VITALS
BODY MASS INDEX: 20.86 KG/M2 | SYSTOLIC BLOOD PRESSURE: 190 MMHG | HEART RATE: 56 BPM | TEMPERATURE: 98.1 F | OXYGEN SATURATION: 100 % | RESPIRATION RATE: 14 BRPM | DIASTOLIC BLOOD PRESSURE: 90 MMHG | WEIGHT: 133.2 LBS

## 2022-01-01 VITALS
OXYGEN SATURATION: 98 % | TEMPERATURE: 98.4 F | RESPIRATION RATE: 18 BRPM | SYSTOLIC BLOOD PRESSURE: 141 MMHG | DIASTOLIC BLOOD PRESSURE: 77 MMHG | HEART RATE: 90 BPM | BODY MASS INDEX: 22.51 KG/M2 | HEIGHT: 67 IN | WEIGHT: 143.4 LBS

## 2022-01-01 VITALS
SYSTOLIC BLOOD PRESSURE: 147 MMHG | WEIGHT: 155 LBS | TEMPERATURE: 100.3 F | DIASTOLIC BLOOD PRESSURE: 98 MMHG | RESPIRATION RATE: 16 BRPM | BODY MASS INDEX: 24.28 KG/M2 | HEART RATE: 89 BPM | OXYGEN SATURATION: 98 %

## 2022-01-01 VITALS
HEART RATE: 83 BPM | SYSTOLIC BLOOD PRESSURE: 125 MMHG | RESPIRATION RATE: 16 BRPM | TEMPERATURE: 97.9 F | DIASTOLIC BLOOD PRESSURE: 77 MMHG

## 2022-01-01 VITALS
RESPIRATION RATE: 14 BRPM | TEMPERATURE: 98.2 F | SYSTOLIC BLOOD PRESSURE: 155 MMHG | HEART RATE: 91 BPM | OXYGEN SATURATION: 99 % | DIASTOLIC BLOOD PRESSURE: 96 MMHG

## 2022-01-01 VITALS
DIASTOLIC BLOOD PRESSURE: 94 MMHG | HEART RATE: 110 BPM | SYSTOLIC BLOOD PRESSURE: 150 MMHG | RESPIRATION RATE: 18 BRPM | OXYGEN SATURATION: 98 % | TEMPERATURE: 100.3 F

## 2022-01-01 VITALS
SYSTOLIC BLOOD PRESSURE: 169 MMHG | OXYGEN SATURATION: 100 % | RESPIRATION RATE: 16 BRPM | HEART RATE: 74 BPM | DIASTOLIC BLOOD PRESSURE: 92 MMHG | TEMPERATURE: 98 F

## 2022-01-01 VITALS
HEART RATE: 88 BPM | TEMPERATURE: 98.1 F | DIASTOLIC BLOOD PRESSURE: 92 MMHG | SYSTOLIC BLOOD PRESSURE: 157 MMHG | OXYGEN SATURATION: 100 %

## 2022-01-01 VITALS
DIASTOLIC BLOOD PRESSURE: 66 MMHG | TEMPERATURE: 96.6 F | HEART RATE: 64 BPM | RESPIRATION RATE: 16 BRPM | SYSTOLIC BLOOD PRESSURE: 115 MMHG

## 2022-01-01 VITALS
HEART RATE: 62 BPM | SYSTOLIC BLOOD PRESSURE: 143 MMHG | TEMPERATURE: 98.2 F | DIASTOLIC BLOOD PRESSURE: 80 MMHG | RESPIRATION RATE: 16 BRPM

## 2022-01-01 VITALS — RESPIRATION RATE: 16 BRPM | HEART RATE: 60 BPM | SYSTOLIC BLOOD PRESSURE: 123 MMHG | DIASTOLIC BLOOD PRESSURE: 61 MMHG

## 2022-01-01 VITALS — SYSTOLIC BLOOD PRESSURE: 162 MMHG | HEART RATE: 77 BPM | DIASTOLIC BLOOD PRESSURE: 98 MMHG

## 2022-01-01 VITALS
DIASTOLIC BLOOD PRESSURE: 85 MMHG | TEMPERATURE: 98.1 F | SYSTOLIC BLOOD PRESSURE: 136 MMHG | BODY MASS INDEX: 21.46 KG/M2 | WEIGHT: 137 LBS | HEART RATE: 68 BPM

## 2022-01-01 VITALS — DIASTOLIC BLOOD PRESSURE: 67 MMHG | HEART RATE: 66 BPM | TEMPERATURE: 98.1 F | SYSTOLIC BLOOD PRESSURE: 121 MMHG

## 2022-01-01 VITALS
BODY MASS INDEX: 22.55 KG/M2 | OXYGEN SATURATION: 99 % | DIASTOLIC BLOOD PRESSURE: 70 MMHG | RESPIRATION RATE: 12 BRPM | SYSTOLIC BLOOD PRESSURE: 133 MMHG | TEMPERATURE: 97.3 F | HEART RATE: 70 BPM | WEIGHT: 144 LBS

## 2022-01-01 VITALS
SYSTOLIC BLOOD PRESSURE: 142 MMHG | DIASTOLIC BLOOD PRESSURE: 87 MMHG | RESPIRATION RATE: 14 BRPM | OXYGEN SATURATION: 99 % | HEART RATE: 82 BPM | TEMPERATURE: 98.5 F

## 2022-01-01 VITALS
RESPIRATION RATE: 18 BRPM | TEMPERATURE: 98.5 F | SYSTOLIC BLOOD PRESSURE: 145 MMHG | HEART RATE: 67 BPM | DIASTOLIC BLOOD PRESSURE: 86 MMHG

## 2022-01-01 VITALS — DIASTOLIC BLOOD PRESSURE: 96 MMHG | RESPIRATION RATE: 16 BRPM | SYSTOLIC BLOOD PRESSURE: 171 MMHG | HEART RATE: 75 BPM

## 2022-01-01 VITALS — WEIGHT: 139.4 LBS | BODY MASS INDEX: 21.83 KG/M2

## 2022-01-01 VITALS
DIASTOLIC BLOOD PRESSURE: 84 MMHG | HEART RATE: 66 BPM | SYSTOLIC BLOOD PRESSURE: 148 MMHG | WEIGHT: 143.2 LBS | TEMPERATURE: 98 F | BODY MASS INDEX: 22.43 KG/M2

## 2022-01-01 VITALS — TEMPERATURE: 98.3 F | SYSTOLIC BLOOD PRESSURE: 125 MMHG | DIASTOLIC BLOOD PRESSURE: 74 MMHG | HEART RATE: 76 BPM

## 2022-01-01 VITALS — TEMPERATURE: 98.2 F | DIASTOLIC BLOOD PRESSURE: 84 MMHG | SYSTOLIC BLOOD PRESSURE: 136 MMHG

## 2022-01-01 VITALS
OXYGEN SATURATION: 98 % | RESPIRATION RATE: 19 BRPM | DIASTOLIC BLOOD PRESSURE: 85 MMHG | BODY MASS INDEX: 21.58 KG/M2 | TEMPERATURE: 97.8 F | HEART RATE: 82 BPM | WEIGHT: 137.8 LBS | SYSTOLIC BLOOD PRESSURE: 161 MMHG

## 2022-01-01 VITALS
WEIGHT: 149.8 LBS | BODY MASS INDEX: 23.46 KG/M2 | TEMPERATURE: 97.7 F | HEART RATE: 72 BPM | DIASTOLIC BLOOD PRESSURE: 77 MMHG | SYSTOLIC BLOOD PRESSURE: 140 MMHG

## 2022-01-01 DIAGNOSIS — C49.9 SPINDLE CELL SARCOMA (H): Primary | ICD-10-CM

## 2022-01-01 DIAGNOSIS — T45.1X5A CHEMOTHERAPY-INDUCED NAUSEA: ICD-10-CM

## 2022-01-01 DIAGNOSIS — C45.9 MESOTHELIOMA, MALIGNANT (H): ICD-10-CM

## 2022-01-01 DIAGNOSIS — R11.0 CHEMOTHERAPY-INDUCED NAUSEA: ICD-10-CM

## 2022-01-01 DIAGNOSIS — C49.9 SPINDLE CELL SARCOMA (H): ICD-10-CM

## 2022-01-01 DIAGNOSIS — C49.9 SARCOMA (H): Primary | ICD-10-CM

## 2022-01-01 DIAGNOSIS — E83.39 HYPOPHOSPHATEMIA: Primary | ICD-10-CM

## 2022-01-01 DIAGNOSIS — R11.0 CHEMOTHERAPY-INDUCED NAUSEA: Primary | ICD-10-CM

## 2022-01-01 DIAGNOSIS — Z51.89 ENCOUNTER FOR OTHER SPECIFIED AFTERCARE: Primary | ICD-10-CM

## 2022-01-01 DIAGNOSIS — C49.9 SARCOMA (H): ICD-10-CM

## 2022-01-01 DIAGNOSIS — D70.1 CHEMOTHERAPY-INDUCED NEUTROPENIA (H): ICD-10-CM

## 2022-01-01 DIAGNOSIS — Z20.822 CONTACT WITH AND (SUSPECTED) EXPOSURE TO COVID-19: ICD-10-CM

## 2022-01-01 DIAGNOSIS — E83.39 HYPOPHOSPHATEMIA: ICD-10-CM

## 2022-01-01 DIAGNOSIS — T45.1X5A CHEMOTHERAPY-INDUCED NEUTROPENIA (H): ICD-10-CM

## 2022-01-01 DIAGNOSIS — E87.6 HYPOKALEMIA: ICD-10-CM

## 2022-01-01 DIAGNOSIS — D63.0 ANEMIA IN NEOPLASTIC DISEASE: ICD-10-CM

## 2022-01-01 DIAGNOSIS — T45.1X5A CHEMOTHERAPY-INDUCED NAUSEA: Primary | ICD-10-CM

## 2022-01-01 DIAGNOSIS — K12.30 MUCOSITIS: ICD-10-CM

## 2022-01-01 DIAGNOSIS — R79.89 ELEVATED SERUM CREATININE: ICD-10-CM

## 2022-01-01 DIAGNOSIS — G89.3 CANCER ASSOCIATED PAIN: ICD-10-CM

## 2022-01-01 DIAGNOSIS — Z98.890 S/P ERCP: ICD-10-CM

## 2022-01-01 DIAGNOSIS — R60.0 BILATERAL LEG EDEMA: ICD-10-CM

## 2022-01-01 DIAGNOSIS — Z90.49 S/P LAPAROSCOPIC CHOLECYSTECTOMY: ICD-10-CM

## 2022-01-01 DIAGNOSIS — Z51.89 ENCOUNTER FOR OTHER SPECIFIED AFTERCARE: ICD-10-CM

## 2022-01-01 DIAGNOSIS — R63.4 WEIGHT LOSS: ICD-10-CM

## 2022-01-01 DIAGNOSIS — R79.89 ELEVATED LFTS: Primary | ICD-10-CM

## 2022-01-01 DIAGNOSIS — D70.1 CHEMOTHERAPY-INDUCED NEUTROPENIA (H): Primary | ICD-10-CM

## 2022-01-01 DIAGNOSIS — J38.01 VOCAL FOLD PARALYSIS, LEFT: ICD-10-CM

## 2022-01-01 DIAGNOSIS — E80.6 HYPERBILIRUBINEMIA: Primary | ICD-10-CM

## 2022-01-01 DIAGNOSIS — I10 BENIGN ESSENTIAL HYPERTENSION: ICD-10-CM

## 2022-01-01 DIAGNOSIS — N17.9 ACUTE RENAL FAILURE, UNSPECIFIED ACUTE RENAL FAILURE TYPE (H): ICD-10-CM

## 2022-01-01 DIAGNOSIS — D70.9 NEUTROPENIA, UNSPECIFIED TYPE (H): ICD-10-CM

## 2022-01-01 DIAGNOSIS — G89.18 ACUTE POST-OPERATIVE PAIN: ICD-10-CM

## 2022-01-01 DIAGNOSIS — K81.0 ACUTE CHOLECYSTITIS: ICD-10-CM

## 2022-01-01 DIAGNOSIS — K92.2 GASTROINTESTINAL HEMORRHAGE, UNSPECIFIED GASTROINTESTINAL HEMORRHAGE TYPE: ICD-10-CM

## 2022-01-01 DIAGNOSIS — D69.59 CHEMOTHERAPY-INDUCED THROMBOCYTOPENIA: ICD-10-CM

## 2022-01-01 DIAGNOSIS — E27.49 SECONDARY ADRENAL INSUFFICIENCY (H): ICD-10-CM

## 2022-01-01 DIAGNOSIS — I10 HYPERTENSION, UNSPECIFIED TYPE: ICD-10-CM

## 2022-01-01 DIAGNOSIS — B37.0 OROPHARYNGEAL CANDIDIASIS: ICD-10-CM

## 2022-01-01 DIAGNOSIS — D63.0 ANEMIA IN NEOPLASTIC DISEASE: Primary | ICD-10-CM

## 2022-01-01 DIAGNOSIS — R79.89 ELEVATED SERUM CREATININE: Primary | ICD-10-CM

## 2022-01-01 DIAGNOSIS — T45.1X5A CHEMOTHERAPY-INDUCED NEUTROPENIA (H): Primary | ICD-10-CM

## 2022-01-01 DIAGNOSIS — R93.3 ABNORMAL MAGNETIC RESONANCE CHOLANGIOPANCREATOGRAPHY (MRCP): ICD-10-CM

## 2022-01-01 DIAGNOSIS — N28.9 RENAL INSUFFICIENCY: ICD-10-CM

## 2022-01-01 DIAGNOSIS — E23.0 PANHYPOPITUITARISM (H): ICD-10-CM

## 2022-01-01 DIAGNOSIS — L71.9 ROSACEA: ICD-10-CM

## 2022-01-01 DIAGNOSIS — L27.0 DRUG RASH: ICD-10-CM

## 2022-01-01 DIAGNOSIS — M79.89 SWELLING OF RIGHT HAND: ICD-10-CM

## 2022-01-01 DIAGNOSIS — R05.9 COUGH: ICD-10-CM

## 2022-01-01 DIAGNOSIS — R49.0 DYSPHONIA: ICD-10-CM

## 2022-01-01 DIAGNOSIS — E80.6 HYPERBILIRUBINEMIA: ICD-10-CM

## 2022-01-01 DIAGNOSIS — E23.0 HYPOPITUITARISM (H): ICD-10-CM

## 2022-01-01 DIAGNOSIS — K12.30 MUCOSITIS: Primary | ICD-10-CM

## 2022-01-01 DIAGNOSIS — R49.0 MUSCLE TENSION DYSPHONIA: ICD-10-CM

## 2022-01-01 DIAGNOSIS — Z79.899 PATIENT ON ANTINEOPLASTIC CHEMOTHERAPY REGIMEN: ICD-10-CM

## 2022-01-01 DIAGNOSIS — D64.81 ANTINEOPLASTIC CHEMOTHERAPY INDUCED ANEMIA: ICD-10-CM

## 2022-01-01 DIAGNOSIS — T50.905A ADVERSE EFFECT OF DRUG, INITIAL ENCOUNTER: ICD-10-CM

## 2022-01-01 DIAGNOSIS — R10.84 ABDOMINAL PAIN, GENERALIZED: ICD-10-CM

## 2022-01-01 DIAGNOSIS — R50.9 ACUTE FEBRILE ILLNESS: ICD-10-CM

## 2022-01-01 DIAGNOSIS — T45.1X5A ANTINEOPLASTIC CHEMOTHERAPY INDUCED ANEMIA: ICD-10-CM

## 2022-01-01 DIAGNOSIS — D35.2 PITUITARY MACROADENOMA (H): ICD-10-CM

## 2022-01-01 DIAGNOSIS — N17.9 ACUTE RENAL FAILURE, UNSPECIFIED ACUTE RENAL FAILURE TYPE (H): Primary | ICD-10-CM

## 2022-01-01 DIAGNOSIS — B37.0 THRUSH: Primary | ICD-10-CM

## 2022-01-01 DIAGNOSIS — E23.0 HYPOPITUITARISM (H): Primary | ICD-10-CM

## 2022-01-01 DIAGNOSIS — T45.1X5A CHEMOTHERAPY-INDUCED THROMBOCYTOPENIA: ICD-10-CM

## 2022-01-01 DIAGNOSIS — K92.1 MELENA: Primary | ICD-10-CM

## 2022-01-01 DIAGNOSIS — R53.83 FATIGUE, UNSPECIFIED TYPE: ICD-10-CM

## 2022-01-01 DIAGNOSIS — E03.8 CENTRAL HYPOTHYROIDISM: ICD-10-CM

## 2022-01-01 DIAGNOSIS — E27.40 ADRENAL INSUFFICIENCY (H): ICD-10-CM

## 2022-01-01 DIAGNOSIS — B37.0 THRUSH: ICD-10-CM

## 2022-01-01 DIAGNOSIS — Z98.890 HISTORY OF PITUITARY SURGERY: ICD-10-CM

## 2022-01-01 DIAGNOSIS — R74.8 ELEVATED LIVER ENZYMES: ICD-10-CM

## 2022-01-01 LAB
ABO/RH(D): NORMAL
ALBUMIN SERPL BCG-MCNC: 2.9 G/DL (ref 3.5–5.2)
ALBUMIN SERPL BCG-MCNC: 3 G/DL (ref 3.5–5.2)
ALBUMIN SERPL BCG-MCNC: 3.2 G/DL (ref 3.5–5.2)
ALBUMIN SERPL BCG-MCNC: 3.3 G/DL (ref 3.5–5.2)
ALBUMIN SERPL BCG-MCNC: 3.3 G/DL (ref 3.5–5.2)
ALBUMIN SERPL BCG-MCNC: 3.4 G/DL (ref 3.5–5.2)
ALBUMIN SERPL BCG-MCNC: 3.5 G/DL (ref 3.5–5.2)
ALBUMIN SERPL BCG-MCNC: 3.6 G/DL (ref 3.5–5.2)
ALBUMIN SERPL BCG-MCNC: 3.6 G/DL (ref 3.5–5.2)
ALBUMIN SERPL-MCNC: 2.9 G/DL (ref 3.4–5)
ALBUMIN SERPL-MCNC: 3 G/DL (ref 3.4–5)
ALBUMIN SERPL-MCNC: 3.1 G/DL (ref 3.4–5)
ALBUMIN SERPL-MCNC: 3.2 G/DL (ref 3.4–5)
ALBUMIN SERPL-MCNC: 3.2 G/DL (ref 3.4–5)
ALBUMIN UR-MCNC: 30 MG/DL
ALBUMIN UR-MCNC: 30 MG/DL
ALBUMIN UR-MCNC: 50 MG/DL
ALBUMIN UR-MCNC: ABNORMAL MG/DL
ALP SERPL-CCNC: 128 U/L (ref 40–150)
ALP SERPL-CCNC: 132 U/L (ref 40–150)
ALP SERPL-CCNC: 138 U/L (ref 40–150)
ALP SERPL-CCNC: 141 U/L (ref 40–150)
ALP SERPL-CCNC: 144 U/L (ref 40–150)
ALP SERPL-CCNC: 146 U/L (ref 40–150)
ALP SERPL-CCNC: 146 U/L (ref 40–150)
ALP SERPL-CCNC: 147 U/L (ref 40–150)
ALP SERPL-CCNC: 148 U/L (ref 40–150)
ALP SERPL-CCNC: 152 U/L (ref 40–150)
ALP SERPL-CCNC: 154 U/L (ref 40–129)
ALP SERPL-CCNC: 157 U/L (ref 40–129)
ALP SERPL-CCNC: 159 U/L (ref 40–150)
ALP SERPL-CCNC: 164 U/L (ref 40–129)
ALP SERPL-CCNC: 166 U/L (ref 40–129)
ALP SERPL-CCNC: 166 U/L (ref 40–129)
ALP SERPL-CCNC: 171 U/L (ref 40–129)
ALP SERPL-CCNC: 172 U/L (ref 40–129)
ALP SERPL-CCNC: 174 U/L (ref 40–129)
ALP SERPL-CCNC: 175 U/L (ref 40–129)
ALP SERPL-CCNC: 178 U/L (ref 40–129)
ALP SERPL-CCNC: 187 U/L (ref 40–129)
ALP SERPL-CCNC: 193 U/L (ref 40–129)
ALP SERPL-CCNC: 195 U/L (ref 40–129)
ALP SERPL-CCNC: 208 U/L (ref 40–129)
ALP SERPL-CCNC: 256 U/L (ref 40–129)
ALP SERPL-CCNC: 256 U/L (ref 40–129)
ALP SERPL-CCNC: 264 U/L (ref 40–129)
ALP SERPL-CCNC: 310 U/L (ref 40–129)
ALP SERPL-CCNC: 334 U/L (ref 40–129)
ALP SERPL-CCNC: 337 U/L (ref 40–129)
ALP SERPL-CCNC: 427 U/L (ref 40–129)
ALP SERPL-CCNC: 456 U/L (ref 40–129)
ALP SERPL-CCNC: 497 U/L (ref 40–129)
ALP SERPL-CCNC: 527 U/L (ref 40–129)
ALP SERPL-CCNC: 762 U/L (ref 40–129)
ALT SERPL W P-5'-P-CCNC: 105 U/L (ref 10–50)
ALT SERPL W P-5'-P-CCNC: 109 U/L (ref 10–50)
ALT SERPL W P-5'-P-CCNC: 114 U/L (ref 10–50)
ALT SERPL W P-5'-P-CCNC: 133 U/L (ref 10–50)
ALT SERPL W P-5'-P-CCNC: 148 U/L (ref 10–50)
ALT SERPL W P-5'-P-CCNC: 15 U/L (ref 0–70)
ALT SERPL W P-5'-P-CCNC: 15 U/L (ref 10–50)
ALT SERPL W P-5'-P-CCNC: 17 U/L (ref 0–70)
ALT SERPL W P-5'-P-CCNC: 17 U/L (ref 0–70)
ALT SERPL W P-5'-P-CCNC: 20 U/L (ref 0–70)
ALT SERPL W P-5'-P-CCNC: 225 U/L (ref 10–50)
ALT SERPL W P-5'-P-CCNC: 23 U/L (ref 10–50)
ALT SERPL W P-5'-P-CCNC: 24 U/L (ref 0–70)
ALT SERPL W P-5'-P-CCNC: 24 U/L (ref 10–50)
ALT SERPL W P-5'-P-CCNC: 27 U/L (ref 0–70)
ALT SERPL W P-5'-P-CCNC: 27 U/L (ref 0–70)
ALT SERPL W P-5'-P-CCNC: 28 U/L (ref 0–70)
ALT SERPL W P-5'-P-CCNC: 28 U/L (ref 0–70)
ALT SERPL W P-5'-P-CCNC: 30 U/L (ref 10–50)
ALT SERPL W P-5'-P-CCNC: 31 U/L (ref 10–50)
ALT SERPL W P-5'-P-CCNC: 32 U/L (ref 0–70)
ALT SERPL W P-5'-P-CCNC: 37 U/L (ref 0–70)
ALT SERPL W P-5'-P-CCNC: 38 U/L (ref 10–50)
ALT SERPL W P-5'-P-CCNC: 41 U/L (ref 10–50)
ALT SERPL W P-5'-P-CCNC: 44 U/L (ref 10–50)
ALT SERPL W P-5'-P-CCNC: 52 U/L (ref 10–50)
ALT SERPL W P-5'-P-CCNC: 54 U/L (ref 10–50)
ALT SERPL W P-5'-P-CCNC: 56 U/L (ref 10–50)
ALT SERPL W P-5'-P-CCNC: 59 U/L (ref 10–50)
ALT SERPL W P-5'-P-CCNC: 61 U/L (ref 10–50)
ALT SERPL W P-5'-P-CCNC: 63 U/L (ref 10–50)
ALT SERPL W P-5'-P-CCNC: 66 U/L (ref 10–50)
ALT SERPL W P-5'-P-CCNC: 76 U/L (ref 10–50)
ALT SERPL W P-5'-P-CCNC: 88 U/L (ref 10–50)
ALT SERPL W P-5'-P-CCNC: 89 U/L (ref 10–50)
ALT SERPL W P-5'-P-CCNC: 90 U/L (ref 10–50)
ANION GAP SERPL CALCULATED.3IONS-SCNC: 10 MMOL/L (ref 7–15)
ANION GAP SERPL CALCULATED.3IONS-SCNC: 11 MMOL/L (ref 7–15)
ANION GAP SERPL CALCULATED.3IONS-SCNC: 13 MMOL/L (ref 7–15)
ANION GAP SERPL CALCULATED.3IONS-SCNC: 13 MMOL/L (ref 7–15)
ANION GAP SERPL CALCULATED.3IONS-SCNC: 3 MMOL/L (ref 3–14)
ANION GAP SERPL CALCULATED.3IONS-SCNC: 4 MMOL/L (ref 3–14)
ANION GAP SERPL CALCULATED.3IONS-SCNC: 5 MMOL/L (ref 3–14)
ANION GAP SERPL CALCULATED.3IONS-SCNC: 5 MMOL/L (ref 3–14)
ANION GAP SERPL CALCULATED.3IONS-SCNC: 5 MMOL/L (ref 7–15)
ANION GAP SERPL CALCULATED.3IONS-SCNC: 6 MMOL/L (ref 3–14)
ANION GAP SERPL CALCULATED.3IONS-SCNC: 7 MMOL/L (ref 3–14)
ANION GAP SERPL CALCULATED.3IONS-SCNC: 7 MMOL/L (ref 7–15)
ANION GAP SERPL CALCULATED.3IONS-SCNC: 8 MMOL/L (ref 3–14)
ANION GAP SERPL CALCULATED.3IONS-SCNC: 8 MMOL/L (ref 7–15)
ANION GAP SERPL CALCULATED.3IONS-SCNC: 9 MMOL/L (ref 7–15)
ANTIBODY SCREEN: NEGATIVE
APPEARANCE UR: ABNORMAL
APPEARANCE UR: CLEAR
AST SERPL W P-5'-P-CCNC: 12 U/L (ref 0–45)
AST SERPL W P-5'-P-CCNC: 13 U/L (ref 0–45)
AST SERPL W P-5'-P-CCNC: 14 U/L (ref 0–45)
AST SERPL W P-5'-P-CCNC: 15 U/L (ref 0–45)
AST SERPL W P-5'-P-CCNC: 16 U/L (ref 0–45)
AST SERPL W P-5'-P-CCNC: 19 U/L (ref 0–45)
AST SERPL W P-5'-P-CCNC: 19 U/L (ref 0–45)
AST SERPL W P-5'-P-CCNC: 19 U/L (ref 10–50)
AST SERPL W P-5'-P-CCNC: 197 U/L (ref 10–50)
AST SERPL W P-5'-P-CCNC: 21 U/L (ref 0–45)
AST SERPL W P-5'-P-CCNC: 23 U/L (ref 0–45)
AST SERPL W P-5'-P-CCNC: 24 U/L (ref 10–50)
AST SERPL W P-5'-P-CCNC: 25 U/L (ref 0–45)
AST SERPL W P-5'-P-CCNC: 26 U/L (ref 10–50)
AST SERPL W P-5'-P-CCNC: 27 U/L (ref 0–45)
AST SERPL W P-5'-P-CCNC: 28 U/L (ref 10–50)
AST SERPL W P-5'-P-CCNC: 30 U/L (ref 10–50)
AST SERPL W P-5'-P-CCNC: 30 U/L (ref 10–50)
AST SERPL W P-5'-P-CCNC: 32 U/L (ref 10–50)
AST SERPL W P-5'-P-CCNC: 34 U/L (ref 10–50)
AST SERPL W P-5'-P-CCNC: 34 U/L (ref 10–50)
AST SERPL W P-5'-P-CCNC: 35 U/L (ref 10–50)
AST SERPL W P-5'-P-CCNC: 36 U/L (ref 10–50)
AST SERPL W P-5'-P-CCNC: 37 U/L (ref 10–50)
AST SERPL W P-5'-P-CCNC: 39 U/L (ref 10–50)
AST SERPL W P-5'-P-CCNC: 40 U/L (ref 10–50)
AST SERPL W P-5'-P-CCNC: 42 U/L (ref 10–50)
AST SERPL W P-5'-P-CCNC: 45 U/L (ref 10–50)
AST SERPL W P-5'-P-CCNC: 48 U/L (ref 10–50)
AST SERPL W P-5'-P-CCNC: 50 U/L (ref 10–50)
AST SERPL W P-5'-P-CCNC: 51 U/L (ref 10–50)
AST SERPL W P-5'-P-CCNC: 59 U/L (ref 10–50)
AST SERPL W P-5'-P-CCNC: 62 U/L (ref 10–50)
AST SERPL W P-5'-P-CCNC: 62 U/L (ref 10–50)
AST SERPL W P-5'-P-CCNC: 65 U/L (ref 10–50)
AST SERPL W P-5'-P-CCNC: 73 U/L (ref 10–50)
BACTERIA #/AREA URNS HPF: ABNORMAL /HPF
BACTERIA #/AREA URNS HPF: ABNORMAL /HPF
BACTERIA BLD CULT: NO GROWTH
BASOPHILS # BLD AUTO: 0 10E3/UL (ref 0–0.2)
BASOPHILS # BLD AUTO: 0.1 10E3/UL (ref 0–0.2)
BASOPHILS # BLD MANUAL: 0 10E3/UL (ref 0–0.2)
BASOPHILS # BLD MANUAL: 0.1 10E3/UL (ref 0–0.2)
BASOPHILS # BLD MANUAL: 0.1 10E3/UL (ref 0–0.2)
BASOPHILS NFR BLD AUTO: 0 %
BASOPHILS NFR BLD AUTO: 1 %
BASOPHILS NFR BLD MANUAL: 0 %
BASOPHILS NFR BLD MANUAL: 1 %
BASOPHILS NFR BLD MANUAL: 5 %
BILIRUB DIRECT SERPL-MCNC: 0.4 MG/DL (ref 0–0.3)
BILIRUB DIRECT SERPL-MCNC: <0.2 MG/DL (ref 0–0.3)
BILIRUB SERPL-MCNC: 0.2 MG/DL
BILIRUB SERPL-MCNC: 0.2 MG/DL (ref 0.2–1.3)
BILIRUB SERPL-MCNC: 0.3 MG/DL
BILIRUB SERPL-MCNC: 0.3 MG/DL (ref 0.2–1.3)
BILIRUB SERPL-MCNC: 0.3 MG/DL (ref 0.2–1.3)
BILIRUB SERPL-MCNC: 0.4 MG/DL
BILIRUB SERPL-MCNC: 0.4 MG/DL (ref 0.2–1.3)
BILIRUB SERPL-MCNC: 0.4 MG/DL (ref 0.2–1.3)
BILIRUB SERPL-MCNC: 0.5 MG/DL
BILIRUB SERPL-MCNC: 0.6 MG/DL
BILIRUB SERPL-MCNC: 0.6 MG/DL
BILIRUB SERPL-MCNC: 0.7 MG/DL
BILIRUB SERPL-MCNC: 0.7 MG/DL
BILIRUB SERPL-MCNC: 1 MG/DL
BILIRUB SERPL-MCNC: 1.3 MG/DL
BILIRUB SERPL-MCNC: 1.3 MG/DL
BILIRUB SERPL-MCNC: 1.4 MG/DL
BILIRUB SERPL-MCNC: 1.6 MG/DL
BILIRUB SERPL-MCNC: 3.5 MG/DL
BILIRUB SERPL-MCNC: <0.2 MG/DL
BILIRUB SERPL-MCNC: <0.2 MG/DL
BILIRUB UR QL STRIP: NEGATIVE
BITE CELLS BLD QL SMEAR: SLIGHT
BLD PROD TYP BPU: NORMAL
BLOOD COMPONENT TYPE: NORMAL
BUN SERPL-MCNC: 14 MG/DL (ref 7–30)
BUN SERPL-MCNC: 14 MG/DL (ref 7–30)
BUN SERPL-MCNC: 15 MG/DL (ref 7–30)
BUN SERPL-MCNC: 15.6 MG/DL (ref 8–23)
BUN SERPL-MCNC: 16 MG/DL (ref 7–30)
BUN SERPL-MCNC: 17 MG/DL (ref 7–30)
BUN SERPL-MCNC: 17 MG/DL (ref 7–30)
BUN SERPL-MCNC: 17.6 MG/DL (ref 8–23)
BUN SERPL-MCNC: 18 MG/DL (ref 7–30)
BUN SERPL-MCNC: 19 MG/DL (ref 8–23)
BUN SERPL-MCNC: 19.1 MG/DL (ref 8–23)
BUN SERPL-MCNC: 19.2 MG/DL (ref 8–23)
BUN SERPL-MCNC: 19.7 MG/DL (ref 8–23)
BUN SERPL-MCNC: 21 MG/DL (ref 7–30)
BUN SERPL-MCNC: 21.4 MG/DL (ref 8–23)
BUN SERPL-MCNC: 21.8 MG/DL (ref 8–23)
BUN SERPL-MCNC: 22 MG/DL (ref 7–30)
BUN SERPL-MCNC: 22.8 MG/DL (ref 8–23)
BUN SERPL-MCNC: 23.2 MG/DL (ref 8–23)
BUN SERPL-MCNC: 24.2 MG/DL (ref 8–23)
BUN SERPL-MCNC: 24.4 MG/DL (ref 8–23)
BUN SERPL-MCNC: 24.5 MG/DL (ref 8–23)
BUN SERPL-MCNC: 27.2 MG/DL (ref 8–23)
BUN SERPL-MCNC: 29.9 MG/DL (ref 8–23)
BUN SERPL-MCNC: 30.8 MG/DL (ref 8–23)
BUN SERPL-MCNC: 31.9 MG/DL (ref 8–23)
BUN SERPL-MCNC: 32.6 MG/DL (ref 8–23)
BUN SERPL-MCNC: 33.6 MG/DL (ref 8–23)
BUN SERPL-MCNC: 34.7 MG/DL (ref 8–23)
BUN SERPL-MCNC: 35.6 MG/DL (ref 8–23)
BUN SERPL-MCNC: 39.9 MG/DL (ref 8–23)
BUN SERPL-MCNC: 42.2 MG/DL (ref 8–23)
BUN SERPL-MCNC: 44.8 MG/DL (ref 8–23)
BUN SERPL-MCNC: 47.9 MG/DL (ref 8–23)
BUN SERPL-MCNC: 48.7 MG/DL (ref 8–23)
CALCIUM SERPL-MCNC: 7.7 MG/DL (ref 8.8–10.2)
CALCIUM SERPL-MCNC: 7.8 MG/DL (ref 8.8–10.2)
CALCIUM SERPL-MCNC: 8 MG/DL (ref 8.8–10.2)
CALCIUM SERPL-MCNC: 8.2 MG/DL (ref 8.8–10.2)
CALCIUM SERPL-MCNC: 8.4 MG/DL (ref 8.5–10.1)
CALCIUM SERPL-MCNC: 8.4 MG/DL (ref 8.5–10.1)
CALCIUM SERPL-MCNC: 8.4 MG/DL (ref 8.8–10.2)
CALCIUM SERPL-MCNC: 8.5 MG/DL (ref 8.5–10.1)
CALCIUM SERPL-MCNC: 8.5 MG/DL (ref 8.8–10.2)
CALCIUM SERPL-MCNC: 8.6 MG/DL (ref 8.5–10.1)
CALCIUM SERPL-MCNC: 8.6 MG/DL (ref 8.8–10.2)
CALCIUM SERPL-MCNC: 8.7 MG/DL (ref 8.5–10.1)
CALCIUM SERPL-MCNC: 8.7 MG/DL (ref 8.8–10.2)
CALCIUM SERPL-MCNC: 8.8 MG/DL (ref 8.5–10.1)
CALCIUM SERPL-MCNC: 8.8 MG/DL (ref 8.8–10.2)
CALCIUM SERPL-MCNC: 8.9 MG/DL (ref 8.8–10.2)
CALCIUM SERPL-MCNC: 9 MG/DL (ref 8.5–10.1)
CALCIUM SERPL-MCNC: 9 MG/DL (ref 8.5–10.1)
CALCIUM SERPL-MCNC: 9 MG/DL (ref 8.8–10.2)
CALCIUM SERPL-MCNC: 9.1 MG/DL (ref 8.5–10.1)
CALCIUM SERPL-MCNC: 9.1 MG/DL (ref 8.5–10.1)
CALCIUM SERPL-MCNC: 9.1 MG/DL (ref 8.8–10.2)
CALCIUM SERPL-MCNC: 9.3 MG/DL (ref 8.5–10.1)
CALCIUM SERPL-MCNC: 9.3 MG/DL (ref 8.8–10.2)
CHLORIDE BLD-SCNC: 106 MMOL/L (ref 94–109)
CHLORIDE BLD-SCNC: 108 MMOL/L (ref 94–109)
CHLORIDE BLD-SCNC: 109 MMOL/L (ref 94–109)
CHLORIDE BLD-SCNC: 110 MMOL/L (ref 94–109)
CHLORIDE SERPL-SCNC: 101 MMOL/L (ref 98–107)
CHLORIDE SERPL-SCNC: 101 MMOL/L (ref 98–107)
CHLORIDE SERPL-SCNC: 102 MMOL/L (ref 98–107)
CHLORIDE SERPL-SCNC: 103 MMOL/L (ref 98–107)
CHLORIDE SERPL-SCNC: 104 MMOL/L (ref 98–107)
CHLORIDE SERPL-SCNC: 105 MMOL/L (ref 98–107)
CHLORIDE SERPL-SCNC: 106 MMOL/L (ref 98–107)
CHLORIDE SERPL-SCNC: 107 MMOL/L (ref 98–107)
CHLORIDE SERPL-SCNC: 107 MMOL/L (ref 98–107)
CHLORIDE SERPL-SCNC: 108 MMOL/L (ref 98–107)
CHLORIDE SERPL-SCNC: 109 MMOL/L (ref 98–107)
CK SERPL-CCNC: 111 U/L (ref 39–308)
CK SERPL-CCNC: 42 U/L (ref 39–308)
CK SERPL-CCNC: 51 U/L (ref 39–308)
CO2 SERPL-SCNC: 25 MMOL/L (ref 20–32)
CO2 SERPL-SCNC: 26 MMOL/L (ref 20–32)
CO2 SERPL-SCNC: 27 MMOL/L (ref 20–32)
CO2 SERPL-SCNC: 28 MMOL/L (ref 20–32)
CO2 SERPL-SCNC: 28 MMOL/L (ref 20–32)
CO2 SERPL-SCNC: 30 MMOL/L (ref 20–32)
CODING SYSTEM: NORMAL
COLOR UR AUTO: ABNORMAL
COLOR UR AUTO: ABNORMAL
COLOR UR AUTO: YELLOW
COLOR UR AUTO: YELLOW
CREAT SERPL-MCNC: 0.92 MG/DL (ref 0.66–1.25)
CREAT SERPL-MCNC: 0.94 MG/DL (ref 0.66–1.25)
CREAT SERPL-MCNC: 0.99 MG/DL (ref 0.67–1.17)
CREAT SERPL-MCNC: 1.02 MG/DL (ref 0.66–1.25)
CREAT SERPL-MCNC: 1.02 MG/DL (ref 0.67–1.17)
CREAT SERPL-MCNC: 1.06 MG/DL (ref 0.66–1.25)
CREAT SERPL-MCNC: 1.07 MG/DL (ref 0.67–1.17)
CREAT SERPL-MCNC: 1.08 MG/DL (ref 0.66–1.25)
CREAT SERPL-MCNC: 1.08 MG/DL (ref 0.67–1.17)
CREAT SERPL-MCNC: 1.09 MG/DL (ref 0.66–1.25)
CREAT SERPL-MCNC: 1.11 MG/DL (ref 0.67–1.17)
CREAT SERPL-MCNC: 1.11 MG/DL (ref 0.67–1.17)
CREAT SERPL-MCNC: 1.12 MG/DL (ref 0.66–1.25)
CREAT SERPL-MCNC: 1.12 MG/DL (ref 0.67–1.17)
CREAT SERPL-MCNC: 1.14 MG/DL (ref 0.66–1.25)
CREAT SERPL-MCNC: 1.14 MG/DL (ref 0.67–1.17)
CREAT SERPL-MCNC: 1.16 MG/DL (ref 0.67–1.17)
CREAT SERPL-MCNC: 1.16 MG/DL (ref 0.67–1.17)
CREAT SERPL-MCNC: 1.17 MG/DL (ref 0.66–1.25)
CREAT SERPL-MCNC: 1.2 MG/DL (ref 0.67–1.17)
CREAT SERPL-MCNC: 1.22 MG/DL (ref 0.67–1.17)
CREAT SERPL-MCNC: 1.24 MG/DL (ref 0.66–1.25)
CREAT SERPL-MCNC: 1.26 MG/DL (ref 0.67–1.17)
CREAT SERPL-MCNC: 1.29 MG/DL (ref 0.67–1.17)
CREAT SERPL-MCNC: 1.29 MG/DL (ref 0.67–1.17)
CREAT SERPL-MCNC: 1.3 MG/DL (ref 0.66–1.25)
CREAT SERPL-MCNC: 1.44 MG/DL (ref 0.67–1.17)
CREAT SERPL-MCNC: 1.47 MG/DL (ref 0.67–1.17)
CREAT SERPL-MCNC: 1.5 MG/DL (ref 0.67–1.17)
CREAT SERPL-MCNC: 1.53 MG/DL (ref 0.67–1.17)
CREAT SERPL-MCNC: 1.58 MG/DL (ref 0.67–1.17)
CREAT SERPL-MCNC: 1.66 MG/DL (ref 0.67–1.17)
CREAT SERPL-MCNC: 1.74 MG/DL (ref 0.67–1.17)
CREAT SERPL-MCNC: 1.79 MG/DL (ref 0.67–1.17)
CREAT SERPL-MCNC: 1.83 MG/DL (ref 0.67–1.17)
CREAT SERPL-MCNC: 1.92 MG/DL (ref 0.67–1.17)
CREAT SERPL-MCNC: 2.17 MG/DL (ref 0.67–1.17)
CREAT SERPL-MCNC: 2.4 MG/DL (ref 0.67–1.17)
CROSSMATCH: NORMAL
CRP SERPL-MCNC: 154.15 MG/L
DACRYOCYTES BLD QL SMEAR: SLIGHT
DEPRECATED HCO3 PLAS-SCNC: 17 MMOL/L (ref 22–29)
DEPRECATED HCO3 PLAS-SCNC: 19 MMOL/L (ref 22–29)
DEPRECATED HCO3 PLAS-SCNC: 19 MMOL/L (ref 22–29)
DEPRECATED HCO3 PLAS-SCNC: 20 MMOL/L (ref 22–29)
DEPRECATED HCO3 PLAS-SCNC: 23 MMOL/L (ref 22–29)
DEPRECATED HCO3 PLAS-SCNC: 23 MMOL/L (ref 22–29)
DEPRECATED HCO3 PLAS-SCNC: 25 MMOL/L (ref 22–29)
DEPRECATED HCO3 PLAS-SCNC: 26 MMOL/L (ref 22–29)
DEPRECATED HCO3 PLAS-SCNC: 27 MMOL/L (ref 22–29)
DEPRECATED HCO3 PLAS-SCNC: 28 MMOL/L (ref 22–29)
DEPRECATED HCO3 PLAS-SCNC: 28 MMOL/L (ref 22–29)
EOSINOPHIL # BLD AUTO: 0 10E3/UL (ref 0–0.7)
EOSINOPHIL # BLD AUTO: 0.1 10E3/UL (ref 0–0.7)
EOSINOPHIL # BLD AUTO: 0.2 10E3/UL (ref 0–0.7)
EOSINOPHIL # BLD MANUAL: 0 10E3/UL (ref 0–0.7)
EOSINOPHIL # BLD MANUAL: 0.1 10E3/UL (ref 0–0.7)
EOSINOPHIL # BLD MANUAL: 0.4 10E3/UL (ref 0–0.7)
EOSINOPHIL NFR BLD AUTO: 0 %
EOSINOPHIL NFR BLD AUTO: 1 %
EOSINOPHIL NFR BLD AUTO: 2 %
EOSINOPHIL NFR BLD AUTO: 2 %
EOSINOPHIL NFR BLD MANUAL: 0 %
EOSINOPHIL NFR BLD MANUAL: 1 %
EOSINOPHIL NFR BLD MANUAL: 2 %
EOSINOPHIL NFR BLD MANUAL: 3 %
ERCP: NORMAL
ERYTHROCYTE [DISTWIDTH] IN BLOOD BY AUTOMATED COUNT: 13.4 % (ref 10–15)
ERYTHROCYTE [DISTWIDTH] IN BLOOD BY AUTOMATED COUNT: 13.5 % (ref 10–15)
ERYTHROCYTE [DISTWIDTH] IN BLOOD BY AUTOMATED COUNT: 13.6 % (ref 10–15)
ERYTHROCYTE [DISTWIDTH] IN BLOOD BY AUTOMATED COUNT: 14.3 % (ref 10–15)
ERYTHROCYTE [DISTWIDTH] IN BLOOD BY AUTOMATED COUNT: 14.6 % (ref 10–15)
ERYTHROCYTE [DISTWIDTH] IN BLOOD BY AUTOMATED COUNT: 15.1 % (ref 10–15)
ERYTHROCYTE [DISTWIDTH] IN BLOOD BY AUTOMATED COUNT: 15.3 % (ref 10–15)
ERYTHROCYTE [DISTWIDTH] IN BLOOD BY AUTOMATED COUNT: 15.3 % (ref 10–15)
ERYTHROCYTE [DISTWIDTH] IN BLOOD BY AUTOMATED COUNT: 15.9 % (ref 10–15)
ERYTHROCYTE [DISTWIDTH] IN BLOOD BY AUTOMATED COUNT: 16 % (ref 10–15)
ERYTHROCYTE [DISTWIDTH] IN BLOOD BY AUTOMATED COUNT: 16 % (ref 10–15)
ERYTHROCYTE [DISTWIDTH] IN BLOOD BY AUTOMATED COUNT: 16.4 % (ref 10–15)
ERYTHROCYTE [DISTWIDTH] IN BLOOD BY AUTOMATED COUNT: 16.5 % (ref 10–15)
ERYTHROCYTE [DISTWIDTH] IN BLOOD BY AUTOMATED COUNT: 16.6 % (ref 10–15)
ERYTHROCYTE [DISTWIDTH] IN BLOOD BY AUTOMATED COUNT: 16.7 % (ref 10–15)
ERYTHROCYTE [DISTWIDTH] IN BLOOD BY AUTOMATED COUNT: 16.7 % (ref 10–15)
ERYTHROCYTE [DISTWIDTH] IN BLOOD BY AUTOMATED COUNT: 17.1 % (ref 10–15)
ERYTHROCYTE [DISTWIDTH] IN BLOOD BY AUTOMATED COUNT: 17.4 % (ref 10–15)
ERYTHROCYTE [DISTWIDTH] IN BLOOD BY AUTOMATED COUNT: 17.5 % (ref 10–15)
ERYTHROCYTE [DISTWIDTH] IN BLOOD BY AUTOMATED COUNT: 17.6 % (ref 10–15)
ERYTHROCYTE [DISTWIDTH] IN BLOOD BY AUTOMATED COUNT: 17.7 % (ref 10–15)
ERYTHROCYTE [DISTWIDTH] IN BLOOD BY AUTOMATED COUNT: 17.8 % (ref 10–15)
ERYTHROCYTE [DISTWIDTH] IN BLOOD BY AUTOMATED COUNT: 17.9 % (ref 10–15)
ERYTHROCYTE [DISTWIDTH] IN BLOOD BY AUTOMATED COUNT: 18 % (ref 10–15)
ERYTHROCYTE [DISTWIDTH] IN BLOOD BY AUTOMATED COUNT: 18.1 % (ref 10–15)
ERYTHROCYTE [DISTWIDTH] IN BLOOD BY AUTOMATED COUNT: 18.2 % (ref 10–15)
ERYTHROCYTE [DISTWIDTH] IN BLOOD BY AUTOMATED COUNT: 18.3 % (ref 10–15)
ERYTHROCYTE [DISTWIDTH] IN BLOOD BY AUTOMATED COUNT: 18.3 % (ref 10–15)
ERYTHROCYTE [DISTWIDTH] IN BLOOD BY AUTOMATED COUNT: 18.4 % (ref 10–15)
ERYTHROCYTE [DISTWIDTH] IN BLOOD BY AUTOMATED COUNT: 18.5 % (ref 10–15)
ERYTHROCYTE [DISTWIDTH] IN BLOOD BY AUTOMATED COUNT: 18.5 % (ref 10–15)
ERYTHROCYTE [DISTWIDTH] IN BLOOD BY AUTOMATED COUNT: 18.6 % (ref 10–15)
ERYTHROCYTE [DISTWIDTH] IN BLOOD BY AUTOMATED COUNT: NORMAL %
FLUAV RNA SPEC QL NAA+PROBE: NEGATIVE
FLUBV RNA RESP QL NAA+PROBE: NEGATIVE
FRAGMENTS BLD QL SMEAR: SLIGHT
GFR SERPL CREATININE-BSD FRML MDRD: 28 ML/MIN/1.73M2
GFR SERPL CREATININE-BSD FRML MDRD: 31 ML/MIN/1.73M2
GFR SERPL CREATININE-BSD FRML MDRD: 36 ML/MIN/1.73M2
GFR SERPL CREATININE-BSD FRML MDRD: 38 ML/MIN/1.73M2
GFR SERPL CREATININE-BSD FRML MDRD: 39 ML/MIN/1.73M2
GFR SERPL CREATININE-BSD FRML MDRD: 41 ML/MIN/1.73M2
GFR SERPL CREATININE-BSD FRML MDRD: 43 ML/MIN/1.73M2
GFR SERPL CREATININE-BSD FRML MDRD: 46 ML/MIN/1.73M2
GFR SERPL CREATININE-BSD FRML MDRD: 47 ML/MIN/1.73M2
GFR SERPL CREATININE-BSD FRML MDRD: 49 ML/MIN/1.73M2
GFR SERPL CREATININE-BSD FRML MDRD: 50 ML/MIN/1.73M2
GFR SERPL CREATININE-BSD FRML MDRD: 51 ML/MIN/1.73M2
GFR SERPL CREATININE-BSD FRML MDRD: 58 ML/MIN/1.73M2
GFR SERPL CREATININE-BSD FRML MDRD: 60 ML/MIN/1.73M2
GFR SERPL CREATININE-BSD FRML MDRD: 61 ML/MIN/1.73M2
GFR SERPL CREATININE-BSD FRML MDRD: 62 ML/MIN/1.73M2
GFR SERPL CREATININE-BSD FRML MDRD: 63 ML/MIN/1.73M2
GFR SERPL CREATININE-BSD FRML MDRD: 66 ML/MIN/1.73M2
GFR SERPL CREATININE-BSD FRML MDRD: 67 ML/MIN/1.73M2
GFR SERPL CREATININE-BSD FRML MDRD: 67 ML/MIN/1.73M2
GFR SERPL CREATININE-BSD FRML MDRD: 69 ML/MIN/1.73M2
GFR SERPL CREATININE-BSD FRML MDRD: 69 ML/MIN/1.73M2
GFR SERPL CREATININE-BSD FRML MDRD: 70 ML/MIN/1.73M2
GFR SERPL CREATININE-BSD FRML MDRD: 70 ML/MIN/1.73M2
GFR SERPL CREATININE-BSD FRML MDRD: 71 ML/MIN/1.73M2
GFR SERPL CREATININE-BSD FRML MDRD: 72 ML/MIN/1.73M2
GFR SERPL CREATININE-BSD FRML MDRD: 72 ML/MIN/1.73M2
GFR SERPL CREATININE-BSD FRML MDRD: 73 ML/MIN/1.73M2
GFR SERPL CREATININE-BSD FRML MDRD: 74 ML/MIN/1.73M2
GFR SERPL CREATININE-BSD FRML MDRD: 77 ML/MIN/1.73M2
GFR SERPL CREATININE-BSD FRML MDRD: 77 ML/MIN/1.73M2
GFR SERPL CREATININE-BSD FRML MDRD: 80 ML/MIN/1.73M2
GFR SERPL CREATININE-BSD FRML MDRD: 85 ML/MIN/1.73M2
GFR SERPL CREATININE-BSD FRML MDRD: 87 ML/MIN/1.73M2
GLUCOSE BLD-MCNC: 101 MG/DL (ref 70–99)
GLUCOSE BLD-MCNC: 117 MG/DL (ref 70–99)
GLUCOSE BLD-MCNC: 124 MG/DL (ref 70–99)
GLUCOSE BLD-MCNC: 126 MG/DL (ref 70–99)
GLUCOSE BLD-MCNC: 135 MG/DL (ref 70–99)
GLUCOSE BLD-MCNC: 135 MG/DL (ref 70–99)
GLUCOSE BLD-MCNC: 137 MG/DL (ref 70–99)
GLUCOSE BLD-MCNC: 139 MG/DL (ref 70–99)
GLUCOSE BLD-MCNC: 145 MG/DL (ref 70–99)
GLUCOSE BLD-MCNC: 166 MG/DL (ref 70–99)
GLUCOSE BLD-MCNC: 93 MG/DL (ref 70–99)
GLUCOSE BLDC GLUCOMTR-MCNC: 136 MG/DL (ref 70–99)
GLUCOSE BLDC GLUCOMTR-MCNC: 153 MG/DL (ref 70–99)
GLUCOSE BLDC GLUCOMTR-MCNC: 155 MG/DL (ref 70–99)
GLUCOSE BLDC GLUCOMTR-MCNC: 180 MG/DL (ref 70–99)
GLUCOSE SERPL-MCNC: 101 MG/DL (ref 70–99)
GLUCOSE SERPL-MCNC: 107 MG/DL (ref 70–99)
GLUCOSE SERPL-MCNC: 111 MG/DL (ref 70–99)
GLUCOSE SERPL-MCNC: 116 MG/DL (ref 70–99)
GLUCOSE SERPL-MCNC: 118 MG/DL (ref 70–99)
GLUCOSE SERPL-MCNC: 120 MG/DL (ref 70–99)
GLUCOSE SERPL-MCNC: 122 MG/DL (ref 70–99)
GLUCOSE SERPL-MCNC: 124 MG/DL (ref 70–99)
GLUCOSE SERPL-MCNC: 125 MG/DL (ref 70–99)
GLUCOSE SERPL-MCNC: 126 MG/DL (ref 70–99)
GLUCOSE SERPL-MCNC: 134 MG/DL (ref 70–99)
GLUCOSE SERPL-MCNC: 135 MG/DL (ref 70–99)
GLUCOSE SERPL-MCNC: 135 MG/DL (ref 70–99)
GLUCOSE SERPL-MCNC: 137 MG/DL (ref 70–99)
GLUCOSE SERPL-MCNC: 141 MG/DL (ref 70–99)
GLUCOSE SERPL-MCNC: 143 MG/DL (ref 70–99)
GLUCOSE SERPL-MCNC: 162 MG/DL (ref 70–99)
GLUCOSE SERPL-MCNC: 170 MG/DL (ref 70–99)
GLUCOSE SERPL-MCNC: 80 MG/DL (ref 70–99)
GLUCOSE SERPL-MCNC: 84 MG/DL (ref 70–99)
GLUCOSE SERPL-MCNC: 86 MG/DL (ref 70–99)
GLUCOSE SERPL-MCNC: 91 MG/DL (ref 70–99)
GLUCOSE SERPL-MCNC: 97 MG/DL (ref 70–99)
GLUCOSE SERPL-MCNC: 97 MG/DL (ref 70–99)
GLUCOSE UR STRIP-MCNC: NEGATIVE MG/DL
HCT VFR BLD AUTO: 21 % (ref 40–53)
HCT VFR BLD AUTO: 22.6 % (ref 40–53)
HCT VFR BLD AUTO: 23.4 % (ref 40–53)
HCT VFR BLD AUTO: 23.6 % (ref 40–53)
HCT VFR BLD AUTO: 24.2 % (ref 40–53)
HCT VFR BLD AUTO: 24.3 % (ref 40–53)
HCT VFR BLD AUTO: 24.6 % (ref 40–53)
HCT VFR BLD AUTO: 24.7 % (ref 40–53)
HCT VFR BLD AUTO: 24.8 % (ref 40–53)
HCT VFR BLD AUTO: 25 % (ref 40–53)
HCT VFR BLD AUTO: 25.2 % (ref 40–53)
HCT VFR BLD AUTO: 25.2 % (ref 40–53)
HCT VFR BLD AUTO: 25.7 % (ref 40–53)
HCT VFR BLD AUTO: 25.8 % (ref 40–53)
HCT VFR BLD AUTO: 25.8 % (ref 40–53)
HCT VFR BLD AUTO: 26.1 % (ref 40–53)
HCT VFR BLD AUTO: 26.1 % (ref 40–53)
HCT VFR BLD AUTO: 26.3 % (ref 40–53)
HCT VFR BLD AUTO: 26.6 % (ref 40–53)
HCT VFR BLD AUTO: 26.8 % (ref 40–53)
HCT VFR BLD AUTO: 26.8 % (ref 40–53)
HCT VFR BLD AUTO: 27.1 % (ref 40–53)
HCT VFR BLD AUTO: 27.8 % (ref 40–53)
HCT VFR BLD AUTO: 29 % (ref 40–53)
HCT VFR BLD AUTO: 29.6 % (ref 40–53)
HCT VFR BLD AUTO: 30.3 % (ref 40–53)
HCT VFR BLD AUTO: 30.5 % (ref 40–53)
HCT VFR BLD AUTO: 30.6 % (ref 40–53)
HCT VFR BLD AUTO: 30.8 % (ref 40–53)
HCT VFR BLD AUTO: 30.8 % (ref 40–53)
HCT VFR BLD AUTO: 30.9 % (ref 40–53)
HCT VFR BLD AUTO: 31.9 % (ref 40–53)
HCT VFR BLD AUTO: 32 % (ref 40–53)
HCT VFR BLD AUTO: 32.3 % (ref 40–53)
HCT VFR BLD AUTO: 32.4 % (ref 40–53)
HCT VFR BLD AUTO: 33.6 % (ref 40–53)
HCT VFR BLD AUTO: NORMAL %
HEMOCCULT STL QL: POSITIVE
HGB BLD-MCNC: 10.2 G/DL (ref 13.3–17.7)
HGB BLD-MCNC: 10.2 G/DL (ref 13.3–17.7)
HGB BLD-MCNC: 10.3 G/DL (ref 13.3–17.7)
HGB BLD-MCNC: 10.4 G/DL (ref 13.3–17.7)
HGB BLD-MCNC: 6.4 G/DL (ref 13.3–17.7)
HGB BLD-MCNC: 6.7 G/DL (ref 13.3–17.7)
HGB BLD-MCNC: 6.8 G/DL (ref 13.3–17.7)
HGB BLD-MCNC: 7.5 G/DL (ref 13.3–17.7)
HGB BLD-MCNC: 7.5 G/DL (ref 13.3–17.7)
HGB BLD-MCNC: 7.6 G/DL (ref 13.3–17.7)
HGB BLD-MCNC: 7.6 G/DL (ref 13.3–17.7)
HGB BLD-MCNC: 7.7 G/DL (ref 13.3–17.7)
HGB BLD-MCNC: 7.8 G/DL (ref 13.3–17.7)
HGB BLD-MCNC: 7.9 G/DL (ref 13.3–17.7)
HGB BLD-MCNC: 7.9 G/DL (ref 13.3–17.7)
HGB BLD-MCNC: 8 G/DL (ref 13.3–17.7)
HGB BLD-MCNC: 8.1 G/DL (ref 13.3–17.7)
HGB BLD-MCNC: 8.2 G/DL (ref 13.3–17.7)
HGB BLD-MCNC: 8.3 G/DL (ref 13.3–17.7)
HGB BLD-MCNC: 8.4 G/DL (ref 13.3–17.7)
HGB BLD-MCNC: 8.4 G/DL (ref 13.3–17.7)
HGB BLD-MCNC: 8.5 G/DL (ref 13.3–17.7)
HGB BLD-MCNC: 8.5 G/DL (ref 13.3–17.7)
HGB BLD-MCNC: 8.7 G/DL (ref 13.3–17.7)
HGB BLD-MCNC: 8.7 G/DL (ref 13.3–17.7)
HGB BLD-MCNC: 9.2 G/DL (ref 13.3–17.7)
HGB BLD-MCNC: 9.2 G/DL (ref 13.3–17.7)
HGB BLD-MCNC: 9.5 G/DL (ref 13.3–17.7)
HGB BLD-MCNC: 9.6 G/DL (ref 13.3–17.7)
HGB BLD-MCNC: 9.7 G/DL (ref 13.3–17.7)
HGB BLD-MCNC: 9.7 G/DL (ref 13.3–17.7)
HGB BLD-MCNC: 9.9 G/DL (ref 13.3–17.7)
HGB BLD-MCNC: NORMAL G/DL
HGB UR QL STRIP: ABNORMAL
HGB UR QL STRIP: NEGATIVE
HOLD SPECIMEN: NORMAL
HYALINE CASTS: 1 /LPF
IMM GRANULOCYTES # BLD: 0 10E3/UL
IMM GRANULOCYTES # BLD: 0.1 10E3/UL
IMM GRANULOCYTES # BLD: 0.2 10E3/UL
IMM GRANULOCYTES # BLD: 0.3 10E3/UL
IMM GRANULOCYTES # BLD: 0.3 10E3/UL
IMM GRANULOCYTES # BLD: 0.4 10E3/UL
IMM GRANULOCYTES NFR BLD: 0 %
IMM GRANULOCYTES NFR BLD: 1 %
IMM GRANULOCYTES NFR BLD: 2 %
IMM GRANULOCYTES NFR BLD: 3 %
IMM GRANULOCYTES NFR BLD: 3 %
IMM GRANULOCYTES NFR BLD: 4 %
ISSUE DATE AND TIME: NORMAL
KETONES UR STRIP-MCNC: NEGATIVE MG/DL
LACTATE SERPL-SCNC: 0.7 MMOL/L (ref 0.7–2)
LACTATE SERPL-SCNC: 1 MMOL/L (ref 0.7–2)
LACTATE SERPL-SCNC: 1.4 MMOL/L (ref 0.7–2)
LEUKOCYTE ESTERASE UR QL STRIP: NEGATIVE
LIPASE SERPL-CCNC: 10 U/L (ref 13–60)
LIPASE SERPL-CCNC: 10 U/L (ref 13–60)
LIPASE SERPL-CCNC: 24 U/L (ref 13–60)
LYMPHOCYTES # BLD AUTO: 1 10E3/UL (ref 0.8–5.3)
LYMPHOCYTES # BLD AUTO: 1.1 10E3/UL (ref 0.8–5.3)
LYMPHOCYTES # BLD AUTO: 1.1 10E3/UL (ref 0.8–5.3)
LYMPHOCYTES # BLD AUTO: 1.3 10E3/UL (ref 0.8–5.3)
LYMPHOCYTES # BLD AUTO: 1.4 10E3/UL (ref 0.8–5.3)
LYMPHOCYTES # BLD AUTO: 1.5 10E3/UL (ref 0.8–5.3)
LYMPHOCYTES # BLD AUTO: 1.6 10E3/UL (ref 0.8–5.3)
LYMPHOCYTES # BLD AUTO: 1.7 10E3/UL (ref 0.8–5.3)
LYMPHOCYTES # BLD AUTO: 1.7 10E3/UL (ref 0.8–5.3)
LYMPHOCYTES # BLD AUTO: 1.8 10E3/UL (ref 0.8–5.3)
LYMPHOCYTES # BLD MANUAL: 0.7 10E3/UL (ref 0.8–5.3)
LYMPHOCYTES # BLD MANUAL: 0.8 10E3/UL (ref 0.8–5.3)
LYMPHOCYTES # BLD MANUAL: 0.8 10E3/UL (ref 0.8–5.3)
LYMPHOCYTES # BLD MANUAL: 1 10E3/UL (ref 0.8–5.3)
LYMPHOCYTES # BLD MANUAL: 1.3 10E3/UL (ref 0.8–5.3)
LYMPHOCYTES # BLD MANUAL: 1.3 10E3/UL (ref 0.8–5.3)
LYMPHOCYTES # BLD MANUAL: 1.4 10E3/UL (ref 0.8–5.3)
LYMPHOCYTES # BLD MANUAL: 1.7 10E3/UL (ref 0.8–5.3)
LYMPHOCYTES # BLD MANUAL: 2.1 10E3/UL (ref 0.8–5.3)
LYMPHOCYTES # BLD MANUAL: 2.1 10E3/UL (ref 0.8–5.3)
LYMPHOCYTES # BLD MANUAL: 2.7 10E3/UL (ref 0.8–5.3)
LYMPHOCYTES # BLD MANUAL: 3 10E3/UL (ref 0.8–5.3)
LYMPHOCYTES # BLD MANUAL: 3.5 10E3/UL (ref 0.8–5.3)
LYMPHOCYTES NFR BLD AUTO: 13 %
LYMPHOCYTES NFR BLD AUTO: 17 %
LYMPHOCYTES NFR BLD AUTO: 20 %
LYMPHOCYTES NFR BLD AUTO: 22 %
LYMPHOCYTES NFR BLD AUTO: 23 %
LYMPHOCYTES NFR BLD AUTO: 28 %
LYMPHOCYTES NFR BLD AUTO: 28 %
LYMPHOCYTES NFR BLD AUTO: 32 %
LYMPHOCYTES NFR BLD AUTO: 34 %
LYMPHOCYTES NFR BLD AUTO: 34 %
LYMPHOCYTES NFR BLD AUTO: 47 %
LYMPHOCYTES NFR BLD AUTO: 9 %
LYMPHOCYTES NFR BLD MANUAL: 14 %
LYMPHOCYTES NFR BLD MANUAL: 17 %
LYMPHOCYTES NFR BLD MANUAL: 29 %
LYMPHOCYTES NFR BLD MANUAL: 30 %
LYMPHOCYTES NFR BLD MANUAL: 33 %
LYMPHOCYTES NFR BLD MANUAL: 33 %
LYMPHOCYTES NFR BLD MANUAL: 38 %
LYMPHOCYTES NFR BLD MANUAL: 43 %
LYMPHOCYTES NFR BLD MANUAL: 47 %
LYMPHOCYTES NFR BLD MANUAL: 48 %
LYMPHOCYTES NFR BLD MANUAL: 55 %
LYMPHOCYTES NFR BLD MANUAL: 86 %
LYMPHOCYTES NFR BLD MANUAL: 9 %
MAGNESIUM SERPL-MCNC: 1.5 MG/DL (ref 1.7–2.3)
MAGNESIUM SERPL-MCNC: 1.7 MG/DL (ref 1.7–2.3)
MAGNESIUM SERPL-MCNC: 1.8 MG/DL (ref 1.7–2.3)
MAGNESIUM SERPL-MCNC: 1.8 MG/DL (ref 1.7–2.3)
MAGNESIUM SERPL-MCNC: 1.9 MG/DL (ref 1.7–2.3)
MAGNESIUM SERPL-MCNC: 2 MG/DL (ref 1.6–2.3)
MAGNESIUM SERPL-MCNC: 2 MG/DL (ref 1.7–2.3)
MAGNESIUM SERPL-MCNC: 2.1 MG/DL (ref 1.6–2.3)
MAGNESIUM SERPL-MCNC: 2.1 MG/DL (ref 1.6–2.3)
MAGNESIUM SERPL-MCNC: 2.1 MG/DL (ref 1.7–2.3)
MAGNESIUM SERPL-MCNC: 2.2 MG/DL (ref 1.6–2.3)
MAGNESIUM SERPL-MCNC: 2.2 MG/DL (ref 1.7–2.3)
MAGNESIUM SERPL-MCNC: 2.2 MG/DL (ref 1.7–2.3)
MAGNESIUM SERPL-MCNC: 2.3 MG/DL (ref 1.6–2.3)
MAGNESIUM SERPL-MCNC: 2.3 MG/DL (ref 1.6–2.3)
MAGNESIUM SERPL-MCNC: 2.3 MG/DL (ref 1.7–2.3)
MAGNESIUM SERPL-MCNC: 2.4 MG/DL (ref 1.7–2.3)
MAGNESIUM SERPL-MCNC: 2.4 MG/DL (ref 1.7–2.3)
MAGNESIUM SERPL-MCNC: 2.5 MG/DL (ref 1.6–2.3)
MAGNESIUM SERPL-MCNC: 2.5 MG/DL (ref 1.6–2.3)
MCH RBC QN AUTO: 29 PG (ref 26.5–33)
MCH RBC QN AUTO: 29.2 PG (ref 26.5–33)
MCH RBC QN AUTO: 29.3 PG (ref 26.5–33)
MCH RBC QN AUTO: 29.6 PG (ref 26.5–33)
MCH RBC QN AUTO: 29.7 PG (ref 26.5–33)
MCH RBC QN AUTO: 29.8 PG (ref 26.5–33)
MCH RBC QN AUTO: 29.9 PG (ref 26.5–33)
MCH RBC QN AUTO: 29.9 PG (ref 26.5–33)
MCH RBC QN AUTO: 30 PG (ref 26.5–33)
MCH RBC QN AUTO: 30.1 PG (ref 26.5–33)
MCH RBC QN AUTO: 30.2 PG (ref 26.5–33)
MCH RBC QN AUTO: 30.3 PG (ref 26.5–33)
MCH RBC QN AUTO: 30.4 PG (ref 26.5–33)
MCH RBC QN AUTO: 30.5 PG (ref 26.5–33)
MCH RBC QN AUTO: 30.6 PG (ref 26.5–33)
MCH RBC QN AUTO: 30.6 PG (ref 26.5–33)
MCH RBC QN AUTO: 30.7 PG (ref 26.5–33)
MCH RBC QN AUTO: 30.8 PG (ref 26.5–33)
MCH RBC QN AUTO: 30.8 PG (ref 26.5–33)
MCH RBC QN AUTO: 30.9 PG (ref 26.5–33)
MCH RBC QN AUTO: 31 PG (ref 26.5–33)
MCH RBC QN AUTO: 31 PG (ref 26.5–33)
MCH RBC QN AUTO: NORMAL PG
MCHC RBC AUTO-ENTMCNC: 30.1 G/DL (ref 31.5–36.5)
MCHC RBC AUTO-ENTMCNC: 30.3 G/DL (ref 31.5–36.5)
MCHC RBC AUTO-ENTMCNC: 30.3 G/DL (ref 31.5–36.5)
MCHC RBC AUTO-ENTMCNC: 30.4 G/DL (ref 31.5–36.5)
MCHC RBC AUTO-ENTMCNC: 30.7 G/DL (ref 31.5–36.5)
MCHC RBC AUTO-ENTMCNC: 30.7 G/DL (ref 31.5–36.5)
MCHC RBC AUTO-ENTMCNC: 30.8 G/DL (ref 31.5–36.5)
MCHC RBC AUTO-ENTMCNC: 30.8 G/DL (ref 31.5–36.5)
MCHC RBC AUTO-ENTMCNC: 30.9 G/DL (ref 31.5–36.5)
MCHC RBC AUTO-ENTMCNC: 31 G/DL (ref 31.5–36.5)
MCHC RBC AUTO-ENTMCNC: 31 G/DL (ref 31.5–36.5)
MCHC RBC AUTO-ENTMCNC: 31.2 G/DL (ref 31.5–36.5)
MCHC RBC AUTO-ENTMCNC: 31.3 G/DL (ref 31.5–36.5)
MCHC RBC AUTO-ENTMCNC: 31.4 G/DL (ref 31.5–36.5)
MCHC RBC AUTO-ENTMCNC: 31.4 G/DL (ref 31.5–36.5)
MCHC RBC AUTO-ENTMCNC: 31.5 G/DL (ref 31.5–36.5)
MCHC RBC AUTO-ENTMCNC: 31.7 G/DL (ref 31.5–36.5)
MCHC RBC AUTO-ENTMCNC: 31.8 G/DL (ref 31.5–36.5)
MCHC RBC AUTO-ENTMCNC: 31.9 G/DL (ref 31.5–36.5)
MCHC RBC AUTO-ENTMCNC: 31.9 G/DL (ref 31.5–36.5)
MCHC RBC AUTO-ENTMCNC: 32 G/DL (ref 31.5–36.5)
MCHC RBC AUTO-ENTMCNC: 32.1 G/DL (ref 31.5–36.5)
MCHC RBC AUTO-ENTMCNC: 32.2 G/DL (ref 31.5–36.5)
MCHC RBC AUTO-ENTMCNC: 32.2 G/DL (ref 31.5–36.5)
MCHC RBC AUTO-ENTMCNC: 32.5 G/DL (ref 31.5–36.5)
MCHC RBC AUTO-ENTMCNC: 32.5 G/DL (ref 31.5–36.5)
MCHC RBC AUTO-ENTMCNC: 32.9 G/DL (ref 31.5–36.5)
MCHC RBC AUTO-ENTMCNC: NORMAL G/DL
MCV RBC AUTO: 90 FL (ref 78–100)
MCV RBC AUTO: 90 FL (ref 78–100)
MCV RBC AUTO: 91 FL (ref 78–100)
MCV RBC AUTO: 93 FL (ref 78–100)
MCV RBC AUTO: 94 FL (ref 78–100)
MCV RBC AUTO: 95 FL (ref 78–100)
MCV RBC AUTO: 96 FL (ref 78–100)
MCV RBC AUTO: 97 FL (ref 78–100)
MCV RBC AUTO: 98 FL (ref 78–100)
MCV RBC AUTO: NORMAL FL
METAMYELOCYTES # BLD MANUAL: 0 10E3/UL
METAMYELOCYTES # BLD MANUAL: 0.1 10E3/UL
METAMYELOCYTES # BLD MANUAL: 0.2 10E3/UL
METAMYELOCYTES # BLD MANUAL: 0.5 10E3/UL
METAMYELOCYTES NFR BLD MANUAL: 1 %
METAMYELOCYTES NFR BLD MANUAL: 1 %
METAMYELOCYTES NFR BLD MANUAL: 2 %
METAMYELOCYTES NFR BLD MANUAL: 4 %
MONOCYTES # BLD AUTO: 0.1 10E3/UL (ref 0–1.3)
MONOCYTES # BLD AUTO: 0.1 10E3/UL (ref 0–1.3)
MONOCYTES # BLD AUTO: 0.2 10E3/UL (ref 0–1.3)
MONOCYTES # BLD AUTO: 0.3 10E3/UL (ref 0–1.3)
MONOCYTES # BLD AUTO: 0.4 10E3/UL (ref 0–1.3)
MONOCYTES # BLD AUTO: 0.6 10E3/UL (ref 0–1.3)
MONOCYTES # BLD AUTO: 0.7 10E3/UL (ref 0–1.3)
MONOCYTES # BLD AUTO: 0.8 10E3/UL (ref 0–1.3)
MONOCYTES # BLD AUTO: 1 10E3/UL (ref 0–1.3)
MONOCYTES # BLD AUTO: 1 10E3/UL (ref 0–1.3)
MONOCYTES # BLD AUTO: 1.1 10E3/UL (ref 0–1.3)
MONOCYTES # BLD AUTO: 1.2 10E3/UL (ref 0–1.3)
MONOCYTES # BLD AUTO: 1.8 10E3/UL (ref 0–1.3)
MONOCYTES # BLD MANUAL: 0 10E3/UL (ref 0–1.3)
MONOCYTES # BLD MANUAL: 0 10E3/UL (ref 0–1.3)
MONOCYTES # BLD MANUAL: 0.1 10E3/UL (ref 0–1.3)
MONOCYTES # BLD MANUAL: 0.3 10E3/UL (ref 0–1.3)
MONOCYTES # BLD MANUAL: 0.4 10E3/UL (ref 0–1.3)
MONOCYTES # BLD MANUAL: 0.6 10E3/UL (ref 0–1.3)
MONOCYTES # BLD MANUAL: 0.7 10E3/UL (ref 0–1.3)
MONOCYTES # BLD MANUAL: 0.8 10E3/UL (ref 0–1.3)
MONOCYTES # BLD MANUAL: 1.1 10E3/UL (ref 0–1.3)
MONOCYTES # BLD MANUAL: 1.2 10E3/UL (ref 0–1.3)
MONOCYTES # BLD MANUAL: 1.3 10E3/UL (ref 0–1.3)
MONOCYTES NFR BLD AUTO: 1 %
MONOCYTES NFR BLD AUTO: 10 %
MONOCYTES NFR BLD AUTO: 10 %
MONOCYTES NFR BLD AUTO: 12 %
MONOCYTES NFR BLD AUTO: 13 %
MONOCYTES NFR BLD AUTO: 14 %
MONOCYTES NFR BLD AUTO: 16 %
MONOCYTES NFR BLD AUTO: 16 %
MONOCYTES NFR BLD AUTO: 17 %
MONOCYTES NFR BLD AUTO: 17 %
MONOCYTES NFR BLD AUTO: 2 %
MONOCYTES NFR BLD AUTO: 3 %
MONOCYTES NFR BLD AUTO: 4 %
MONOCYTES NFR BLD AUTO: 5 %
MONOCYTES NFR BLD AUTO: 7 %
MONOCYTES NFR BLD AUTO: 8 %
MONOCYTES NFR BLD AUTO: 9 %
MONOCYTES NFR BLD MANUAL: 0 %
MONOCYTES NFR BLD MANUAL: 1 %
MONOCYTES NFR BLD MANUAL: 10 %
MONOCYTES NFR BLD MANUAL: 10 %
MONOCYTES NFR BLD MANUAL: 13 %
MONOCYTES NFR BLD MANUAL: 14 %
MONOCYTES NFR BLD MANUAL: 15 %
MONOCYTES NFR BLD MANUAL: 17 %
MONOCYTES NFR BLD MANUAL: 3 %
MONOCYTES NFR BLD MANUAL: 4 %
MONOCYTES NFR BLD MANUAL: 6 %
MONOCYTES NFR BLD MANUAL: 6 %
MONOCYTES NFR BLD MANUAL: 7 %
MUCOUS THREADS #/AREA URNS LPF: PRESENT /LPF
MYELOCYTES # BLD MANUAL: 0 10E3/UL
MYELOCYTES # BLD MANUAL: 0.1 10E3/UL
MYELOCYTES # BLD MANUAL: 0.1 10E3/UL
MYELOCYTES # BLD MANUAL: 0.6 10E3/UL
MYELOCYTES NFR BLD MANUAL: 1 %
MYELOCYTES NFR BLD MANUAL: 2 %
MYELOCYTES NFR BLD MANUAL: 3 %
MYELOCYTES NFR BLD MANUAL: 5 %
NEUTROPHILS # BLD AUTO: 1.2 10E3/UL (ref 1.6–8.3)
NEUTROPHILS # BLD AUTO: 1.8 10E3/UL (ref 1.6–8.3)
NEUTROPHILS # BLD AUTO: 2.5 10E3/UL (ref 1.6–8.3)
NEUTROPHILS # BLD AUTO: 2.6 10E3/UL (ref 1.6–8.3)
NEUTROPHILS # BLD AUTO: 2.7 10E3/UL (ref 1.6–8.3)
NEUTROPHILS # BLD AUTO: 3.1 10E3/UL (ref 1.6–8.3)
NEUTROPHILS # BLD AUTO: 3.9 10E3/UL (ref 1.6–8.3)
NEUTROPHILS # BLD AUTO: 4.7 10E3/UL (ref 1.6–8.3)
NEUTROPHILS # BLD AUTO: 4.9 10E3/UL (ref 1.6–8.3)
NEUTROPHILS # BLD AUTO: 5 10E3/UL (ref 1.6–8.3)
NEUTROPHILS # BLD AUTO: 5.4 10E3/UL (ref 1.6–8.3)
NEUTROPHILS # BLD AUTO: 5.5 10E3/UL (ref 1.6–8.3)
NEUTROPHILS # BLD AUTO: 5.8 10E3/UL (ref 1.6–8.3)
NEUTROPHILS # BLD AUTO: 6.1 10E3/UL (ref 1.6–8.3)
NEUTROPHILS # BLD AUTO: 6.5 10E3/UL (ref 1.6–8.3)
NEUTROPHILS # BLD AUTO: 7.3 10E3/UL (ref 1.6–8.3)
NEUTROPHILS # BLD AUTO: 7.5 10E3/UL (ref 1.6–8.3)
NEUTROPHILS # BLD AUTO: 8.4 10E3/UL (ref 1.6–8.3)
NEUTROPHILS # BLD AUTO: 9.9 10E3/UL (ref 1.6–8.3)
NEUTROPHILS # BLD MANUAL: 0.4 10E3/UL (ref 1.6–8.3)
NEUTROPHILS # BLD MANUAL: 1 10E3/UL (ref 1.6–8.3)
NEUTROPHILS # BLD MANUAL: 1.1 10E3/UL (ref 1.6–8.3)
NEUTROPHILS # BLD MANUAL: 1.2 10E3/UL (ref 1.6–8.3)
NEUTROPHILS # BLD MANUAL: 1.7 10E3/UL (ref 1.6–8.3)
NEUTROPHILS # BLD MANUAL: 1.7 10E3/UL (ref 1.6–8.3)
NEUTROPHILS # BLD MANUAL: 15.5 10E3/UL (ref 1.6–8.3)
NEUTROPHILS # BLD MANUAL: 2 10E3/UL (ref 1.6–8.3)
NEUTROPHILS # BLD MANUAL: 2.1 10E3/UL (ref 1.6–8.3)
NEUTROPHILS # BLD MANUAL: 3.1 10E3/UL (ref 1.6–8.3)
NEUTROPHILS # BLD MANUAL: 3.8 10E3/UL (ref 1.6–8.3)
NEUTROPHILS # BLD MANUAL: 5.2 10E3/UL (ref 1.6–8.3)
NEUTROPHILS # BLD MANUAL: 7.4 10E3/UL (ref 1.6–8.3)
NEUTROPHILS NFR BLD AUTO: 38 %
NEUTROPHILS NFR BLD AUTO: 47 %
NEUTROPHILS NFR BLD AUTO: 50 %
NEUTROPHILS NFR BLD AUTO: 53 %
NEUTROPHILS NFR BLD AUTO: 62 %
NEUTROPHILS NFR BLD AUTO: 62 %
NEUTROPHILS NFR BLD AUTO: 63 %
NEUTROPHILS NFR BLD AUTO: 63 %
NEUTROPHILS NFR BLD AUTO: 64 %
NEUTROPHILS NFR BLD AUTO: 68 %
NEUTROPHILS NFR BLD AUTO: 71 %
NEUTROPHILS NFR BLD AUTO: 71 %
NEUTROPHILS NFR BLD AUTO: 72 %
NEUTROPHILS NFR BLD AUTO: 72 %
NEUTROPHILS NFR BLD AUTO: 73 %
NEUTROPHILS NFR BLD AUTO: 75 %
NEUTROPHILS NFR BLD AUTO: 75 %
NEUTROPHILS NFR BLD AUTO: 79 %
NEUTROPHILS NFR BLD AUTO: 89 %
NEUTROPHILS NFR BLD MANUAL: 13 %
NEUTROPHILS NFR BLD MANUAL: 30 %
NEUTROPHILS NFR BLD MANUAL: 40 %
NEUTROPHILS NFR BLD MANUAL: 42 %
NEUTROPHILS NFR BLD MANUAL: 46 %
NEUTROPHILS NFR BLD MANUAL: 49 %
NEUTROPHILS NFR BLD MANUAL: 52 %
NEUTROPHILS NFR BLD MANUAL: 54 %
NEUTROPHILS NFR BLD MANUAL: 58 %
NEUTROPHILS NFR BLD MANUAL: 60 %
NEUTROPHILS NFR BLD MANUAL: 67 %
NEUTROPHILS NFR BLD MANUAL: 84 %
NEUTROPHILS NFR BLD MANUAL: 86 %
NITRATE UR QL: NEGATIVE
NRBC # BLD AUTO: 0 10E3/UL
NRBC # BLD AUTO: 0.1 10E3/UL
NRBC BLD AUTO-RTO: 0 /100
NRBC BLD MANUAL-RTO: 1 %
PATH REPORT.COMMENTS IMP SPEC: NORMAL
PATH REPORT.COMMENTS IMP SPEC: NORMAL
PATH REPORT.FINAL DX SPEC: NORMAL
PATH REPORT.GROSS SPEC: NORMAL
PATH REPORT.MICROSCOPIC SPEC OTHER STN: NORMAL
PATH REPORT.RELEVANT HX SPEC: NORMAL
PH UR STRIP: 5.5 [PH] (ref 5–7)
PH UR STRIP: 5.5 [PH] (ref 5–7)
PH UR STRIP: 6 [PH] (ref 5–7)
PH UR STRIP: 6.5 [PH] (ref 5–7)
PHOSPHATE SERPL-MCNC: 1.5 MG/DL (ref 2.5–4.5)
PHOSPHATE SERPL-MCNC: 1.9 MG/DL (ref 2.5–4.5)
PHOSPHATE SERPL-MCNC: 2.1 MG/DL (ref 2.5–4.5)
PHOSPHATE SERPL-MCNC: 2.1 MG/DL (ref 2.5–4.5)
PHOSPHATE SERPL-MCNC: 2.3 MG/DL (ref 2.5–4.5)
PHOSPHATE SERPL-MCNC: 2.4 MG/DL (ref 2.5–4.5)
PHOSPHATE SERPL-MCNC: 2.5 MG/DL (ref 2.5–4.5)
PHOSPHATE SERPL-MCNC: 2.5 MG/DL (ref 2.5–4.5)
PHOSPHATE SERPL-MCNC: 2.6 MG/DL (ref 2.5–4.5)
PHOSPHATE SERPL-MCNC: 2.8 MG/DL (ref 2.5–4.5)
PHOSPHATE SERPL-MCNC: 2.8 MG/DL (ref 2.5–4.5)
PHOSPHATE SERPL-MCNC: 2.9 MG/DL (ref 2.5–4.5)
PHOSPHATE SERPL-MCNC: 3 MG/DL (ref 2.5–4.5)
PHOSPHATE SERPL-MCNC: 3.1 MG/DL (ref 2.5–4.5)
PHOSPHATE SERPL-MCNC: 3.3 MG/DL (ref 2.5–4.5)
PHOSPHATE SERPL-MCNC: 3.6 MG/DL (ref 2.5–4.5)
PHOSPHATE SERPL-MCNC: 4.9 MG/DL (ref 2.5–4.5)
PHOTO IMAGE: NORMAL
PLAT MORPH BLD: ABNORMAL
PLAT MORPH BLD: NORMAL
PLATELET # BLD AUTO: 107 10E3/UL (ref 150–450)
PLATELET # BLD AUTO: 108 10E3/UL (ref 150–450)
PLATELET # BLD AUTO: 111 10E3/UL (ref 150–450)
PLATELET # BLD AUTO: 114 10E3/UL (ref 150–450)
PLATELET # BLD AUTO: 121 10E3/UL (ref 150–450)
PLATELET # BLD AUTO: 128 10E3/UL (ref 150–450)
PLATELET # BLD AUTO: 129 10E3/UL (ref 150–450)
PLATELET # BLD AUTO: 130 10E3/UL (ref 150–450)
PLATELET # BLD AUTO: 131 10E3/UL (ref 150–450)
PLATELET # BLD AUTO: 135 10E3/UL (ref 150–450)
PLATELET # BLD AUTO: 14 10E3/UL (ref 150–450)
PLATELET # BLD AUTO: 141 10E3/UL (ref 150–450)
PLATELET # BLD AUTO: 143 10E3/UL (ref 150–450)
PLATELET # BLD AUTO: 152 10E3/UL (ref 150–450)
PLATELET # BLD AUTO: 154 10E3/UL (ref 150–450)
PLATELET # BLD AUTO: 171 10E3/UL (ref 150–450)
PLATELET # BLD AUTO: 18 10E3/UL (ref 150–450)
PLATELET # BLD AUTO: 180 10E3/UL (ref 150–450)
PLATELET # BLD AUTO: 195 10E3/UL (ref 150–450)
PLATELET # BLD AUTO: 198 10E3/UL (ref 150–450)
PLATELET # BLD AUTO: 199 10E3/UL (ref 150–450)
PLATELET # BLD AUTO: 199 10E3/UL (ref 150–450)
PLATELET # BLD AUTO: 203 10E3/UL (ref 150–450)
PLATELET # BLD AUTO: 204 10E3/UL (ref 150–450)
PLATELET # BLD AUTO: 208 10E3/UL (ref 150–450)
PLATELET # BLD AUTO: 21 10E3/UL (ref 150–450)
PLATELET # BLD AUTO: 212 10E3/UL (ref 150–450)
PLATELET # BLD AUTO: 215 10E3/UL (ref 150–450)
PLATELET # BLD AUTO: 217 10E3/UL (ref 150–450)
PLATELET # BLD AUTO: 231 10E3/UL (ref 150–450)
PLATELET # BLD AUTO: 235 10E3/UL (ref 150–450)
PLATELET # BLD AUTO: 258 10E3/UL (ref 150–450)
PLATELET # BLD AUTO: 35 10E3/UL (ref 150–450)
PLATELET # BLD AUTO: 44 10E3/UL (ref 150–450)
PLATELET # BLD AUTO: 51 10E3/UL (ref 150–450)
PLATELET # BLD AUTO: 64 10E3/UL (ref 150–450)
PLATELET # BLD AUTO: 72 10E3/UL (ref 150–450)
PLATELET # BLD AUTO: 74 10E3/UL (ref 150–450)
PLATELET # BLD AUTO: NORMAL 10*3/UL
POTASSIUM BLD-SCNC: 3.1 MMOL/L (ref 3.4–5.3)
POTASSIUM BLD-SCNC: 3.3 MMOL/L (ref 3.4–5.3)
POTASSIUM BLD-SCNC: 3.4 MMOL/L (ref 3.4–5.3)
POTASSIUM BLD-SCNC: 3.5 MMOL/L (ref 3.4–5.3)
POTASSIUM BLD-SCNC: 3.5 MMOL/L (ref 3.4–5.3)
POTASSIUM BLD-SCNC: 3.6 MMOL/L (ref 3.4–5.3)
POTASSIUM BLD-SCNC: 3.7 MMOL/L (ref 3.4–5.3)
POTASSIUM BLD-SCNC: 3.7 MMOL/L (ref 3.4–5.3)
POTASSIUM BLD-SCNC: 3.8 MMOL/L (ref 3.4–5.3)
POTASSIUM BLD-SCNC: 3.9 MMOL/L (ref 3.4–5.3)
POTASSIUM BLD-SCNC: 3.9 MMOL/L (ref 3.4–5.3)
POTASSIUM SERPL-SCNC: 3.1 MMOL/L (ref 3.4–5.3)
POTASSIUM SERPL-SCNC: 3.1 MMOL/L (ref 3.4–5.3)
POTASSIUM SERPL-SCNC: 3.3 MMOL/L (ref 3.4–5.3)
POTASSIUM SERPL-SCNC: 3.4 MMOL/L (ref 3.4–5.3)
POTASSIUM SERPL-SCNC: 3.5 MMOL/L (ref 3.4–5.3)
POTASSIUM SERPL-SCNC: 3.6 MMOL/L (ref 3.4–5.3)
POTASSIUM SERPL-SCNC: 3.7 MMOL/L (ref 3.4–5.3)
POTASSIUM SERPL-SCNC: 3.7 MMOL/L (ref 3.4–5.3)
POTASSIUM SERPL-SCNC: 3.8 MMOL/L (ref 3.4–5.3)
POTASSIUM SERPL-SCNC: 3.9 MMOL/L (ref 3.4–5.3)
POTASSIUM SERPL-SCNC: 4.2 MMOL/L (ref 3.4–5.3)
POTASSIUM SERPL-SCNC: 4.3 MMOL/L (ref 3.4–5.3)
POTASSIUM SERPL-SCNC: 4.5 MMOL/L (ref 3.4–5.3)
POTASSIUM SERPL-SCNC: 4.8 MMOL/L (ref 3.4–5.3)
POTASSIUM SERPL-SCNC: 5 MMOL/L (ref 3.4–5.3)
POTASSIUM SERPL-SCNC: 5.1 MMOL/L (ref 3.4–5.3)
PROCALCITONIN SERPL IA-MCNC: 0.47 NG/ML
PROT SERPL-MCNC: 4.9 G/DL (ref 6.4–8.3)
PROT SERPL-MCNC: 5.4 G/DL (ref 6.4–8.3)
PROT SERPL-MCNC: 5.4 G/DL (ref 6.4–8.3)
PROT SERPL-MCNC: 5.6 G/DL (ref 6.4–8.3)
PROT SERPL-MCNC: 5.7 G/DL (ref 6.4–8.3)
PROT SERPL-MCNC: 5.8 G/DL (ref 6.4–8.3)
PROT SERPL-MCNC: 5.9 G/DL (ref 6.4–8.3)
PROT SERPL-MCNC: 6 G/DL (ref 6.4–8.3)
PROT SERPL-MCNC: 6.1 G/DL (ref 6.4–8.3)
PROT SERPL-MCNC: 6.2 G/DL (ref 6.4–8.3)
PROT SERPL-MCNC: 6.3 G/DL (ref 6.8–8.8)
PROT SERPL-MCNC: 6.4 G/DL (ref 6.8–8.8)
PROT SERPL-MCNC: 6.5 G/DL (ref 6.4–8.3)
PROT SERPL-MCNC: 6.5 G/DL (ref 6.4–8.3)
PROT SERPL-MCNC: 6.5 G/DL (ref 6.8–8.8)
PROT SERPL-MCNC: 6.6 G/DL (ref 6.4–8.3)
PROT SERPL-MCNC: 6.6 G/DL (ref 6.8–8.8)
PROT SERPL-MCNC: 6.7 G/DL (ref 6.8–8.8)
PROT SERPL-MCNC: 6.9 G/DL (ref 6.8–8.8)
PROT SERPL-MCNC: 6.9 G/DL (ref 6.8–8.8)
PROT SERPL-MCNC: 7 G/DL (ref 6.8–8.8)
PROT SERPL-MCNC: 7.1 G/DL (ref 6.8–8.8)
RBC # BLD AUTO: 2.16 10E6/UL (ref 4.4–5.9)
RBC # BLD AUTO: 2.32 10E6/UL (ref 4.4–5.9)
RBC # BLD AUTO: 2.45 10E6/UL (ref 4.4–5.9)
RBC # BLD AUTO: 2.53 10E6/UL (ref 4.4–5.9)
RBC # BLD AUTO: 2.56 10E6/UL (ref 4.4–5.9)
RBC # BLD AUTO: 2.56 10E6/UL (ref 4.4–5.9)
RBC # BLD AUTO: 2.57 10E6/UL (ref 4.4–5.9)
RBC # BLD AUTO: 2.6 10E6/UL (ref 4.4–5.9)
RBC # BLD AUTO: 2.6 10E6/UL (ref 4.4–5.9)
RBC # BLD AUTO: 2.61 10E6/UL (ref 4.4–5.9)
RBC # BLD AUTO: 2.66 10E6/UL (ref 4.4–5.9)
RBC # BLD AUTO: 2.67 10E6/UL (ref 4.4–5.9)
RBC # BLD AUTO: 2.68 10E6/UL (ref 4.4–5.9)
RBC # BLD AUTO: 2.7 10E6/UL (ref 4.4–5.9)
RBC # BLD AUTO: 2.7 10E6/UL (ref 4.4–5.9)
RBC # BLD AUTO: 2.72 10E6/UL (ref 4.4–5.9)
RBC # BLD AUTO: 2.75 10E6/UL (ref 4.4–5.9)
RBC # BLD AUTO: 2.79 10E6/UL (ref 4.4–5.9)
RBC # BLD AUTO: 2.81 10E6/UL (ref 4.4–5.9)
RBC # BLD AUTO: 2.82 10E6/UL (ref 4.4–5.9)
RBC # BLD AUTO: 2.82 10E6/UL (ref 4.4–5.9)
RBC # BLD AUTO: 2.85 10E6/UL (ref 4.4–5.9)
RBC # BLD AUTO: 2.86 10E6/UL (ref 4.4–5.9)
RBC # BLD AUTO: 2.88 10E6/UL (ref 4.4–5.9)
RBC # BLD AUTO: 2.9 10E6/UL (ref 4.4–5.9)
RBC # BLD AUTO: 3.04 10E6/UL (ref 4.4–5.9)
RBC # BLD AUTO: 3.12 10E6/UL (ref 4.4–5.9)
RBC # BLD AUTO: 3.15 10E6/UL (ref 4.4–5.9)
RBC # BLD AUTO: 3.16 10E6/UL (ref 4.4–5.9)
RBC # BLD AUTO: 3.17 10E6/UL (ref 4.4–5.9)
RBC # BLD AUTO: 3.18 10E6/UL (ref 4.4–5.9)
RBC # BLD AUTO: 3.19 10E6/UL (ref 4.4–5.9)
RBC # BLD AUTO: 3.28 10E6/UL (ref 4.4–5.9)
RBC # BLD AUTO: 3.29 10E6/UL (ref 4.4–5.9)
RBC # BLD AUTO: 3.3 10E6/UL (ref 4.4–5.9)
RBC # BLD AUTO: 3.38 10E6/UL (ref 4.4–5.9)
RBC # BLD AUTO: 3.41 10E6/UL (ref 4.4–5.9)
RBC # BLD AUTO: 3.47 10E6/UL (ref 4.4–5.9)
RBC # BLD AUTO: NORMAL 10*6/UL
RBC MORPH BLD: ABNORMAL
RBC MORPH BLD: NORMAL
RBC URINE: 1 /HPF
RBC URINE: 1 /HPF
RBC URINE: 6 /HPF
RBC URINE: <1 /HPF
RENIN PLAS-CCNC: 0.3 NG/ML/HR
SARS-COV-2 RNA RESP QL NAA+PROBE: NEGATIVE
SODIUM SERPL-SCNC: 136 MMOL/L (ref 133–144)
SODIUM SERPL-SCNC: 136 MMOL/L (ref 136–145)
SODIUM SERPL-SCNC: 137 MMOL/L (ref 136–145)
SODIUM SERPL-SCNC: 138 MMOL/L (ref 136–145)
SODIUM SERPL-SCNC: 139 MMOL/L (ref 133–144)
SODIUM SERPL-SCNC: 139 MMOL/L (ref 136–145)
SODIUM SERPL-SCNC: 140 MMOL/L (ref 133–144)
SODIUM SERPL-SCNC: 140 MMOL/L (ref 136–145)
SODIUM SERPL-SCNC: 141 MMOL/L (ref 133–144)
SODIUM SERPL-SCNC: 141 MMOL/L (ref 136–145)
SODIUM SERPL-SCNC: 142 MMOL/L (ref 133–144)
SODIUM SERPL-SCNC: 142 MMOL/L (ref 136–145)
SODIUM SERPL-SCNC: 143 MMOL/L (ref 133–144)
SODIUM SERPL-SCNC: 144 MMOL/L (ref 133–144)
SODIUM SERPL-SCNC: 144 MMOL/L (ref 133–144)
SP GR UR STRIP: 1.01 (ref 1–1.03)
SP GR UR STRIP: 1.02 (ref 1–1.03)
SPECIMEN EXPIRATION DATE: NORMAL
SQUAMOUS EPITHELIAL: <1 /HPF
T4 FREE SERPL-MCNC: 1.27 NG/DL (ref 0.9–1.7)
TOXIC GRANULES BLD QL SMEAR: PRESENT
UNIT ABO/RH: NORMAL
UNIT NUMBER: NORMAL
UNIT STATUS: NORMAL
UNIT TYPE ISBT: 5100
UNIT TYPE ISBT: 6200
UPPER GI ENDOSCOPY: NORMAL
UROBILINOGEN UR STRIP-MCNC: 2 MG/DL
UROBILINOGEN UR STRIP-MCNC: 2 MG/DL
UROBILINOGEN UR STRIP-MCNC: NORMAL MG/DL
UROBILINOGEN UR STRIP-MCNC: NORMAL MG/DL
WBC # BLD AUTO: 1.9 10E3/UL (ref 4–11)
WBC # BLD AUTO: 10.4 10E3/UL (ref 4–11)
WBC # BLD AUTO: 10.6 10E3/UL (ref 4–11)
WBC # BLD AUTO: 11 10E3/UL (ref 4–11)
WBC # BLD AUTO: 11.7 10E3/UL (ref 4–11)
WBC # BLD AUTO: 12.3 10E3/UL (ref 4–11)
WBC # BLD AUTO: 12.4 10E3/UL (ref 4–11)
WBC # BLD AUTO: 16.3 10E3/UL (ref 4–11)
WBC # BLD AUTO: 18.5 10E3/UL (ref 4–11)
WBC # BLD AUTO: 2.4 10E3/UL (ref 4–11)
WBC # BLD AUTO: 2.5 10E3/UL (ref 4–11)
WBC # BLD AUTO: 20.5 10E3/UL (ref 4–11)
WBC # BLD AUTO: 24.2 10E3/UL (ref 4–11)
WBC # BLD AUTO: 3 10E3/UL (ref 4–11)
WBC # BLD AUTO: 3.1 10E3/UL (ref 4–11)
WBC # BLD AUTO: 3.2 10E3/UL (ref 4–11)
WBC # BLD AUTO: 3.9 10E3/UL (ref 4–11)
WBC # BLD AUTO: 3.9 10E3/UL (ref 4–11)
WBC # BLD AUTO: 4 10E3/UL (ref 4–11)
WBC # BLD AUTO: 5.4 10E3/UL (ref 4–11)
WBC # BLD AUTO: 5.5 10E3/UL (ref 4–11)
WBC # BLD AUTO: 5.7 10E3/UL (ref 4–11)
WBC # BLD AUTO: 6 10E3/UL (ref 4–11)
WBC # BLD AUTO: 6.2 10E3/UL (ref 4–11)
WBC # BLD AUTO: 6.5 10E3/UL (ref 4–11)
WBC # BLD AUTO: 6.7 10E3/UL (ref 4–11)
WBC # BLD AUTO: 7.3 10E3/UL (ref 4–11)
WBC # BLD AUTO: 7.3 10E3/UL (ref 4–11)
WBC # BLD AUTO: 7.4 10E3/UL (ref 4–11)
WBC # BLD AUTO: 7.6 10E3/UL (ref 4–11)
WBC # BLD AUTO: 7.7 10E3/UL (ref 4–11)
WBC # BLD AUTO: 7.7 10E3/UL (ref 4–11)
WBC # BLD AUTO: 8 10E3/UL (ref 4–11)
WBC # BLD AUTO: 8.3 10E3/UL (ref 4–11)
WBC # BLD AUTO: 8.6 10E3/UL (ref 4–11)
WBC # BLD AUTO: 9.8 10E3/UL (ref 4–11)
WBC # BLD AUTO: NORMAL 10*3/UL
WBC URINE: 1 /HPF
WBC URINE: <1 /HPF

## 2022-01-01 PROCEDURE — 272N000001 HC OR GENERAL SUPPLY STERILE: Performed by: INTERNAL MEDICINE

## 2022-01-01 PROCEDURE — 85027 COMPLETE CBC AUTOMATED: CPT | Performed by: PHYSICIAN ASSISTANT

## 2022-01-01 PROCEDURE — 250N000013 HC RX MED GY IP 250 OP 250 PS 637: Performed by: INTERNAL MEDICINE

## 2022-01-01 PROCEDURE — 258N000003 HC RX IP 258 OP 636: Performed by: EMERGENCY MEDICINE

## 2022-01-01 PROCEDURE — 99223 1ST HOSP IP/OBS HIGH 75: CPT | Performed by: NURSE PRACTITIONER

## 2022-01-01 PROCEDURE — 85007 BL SMEAR W/DIFF WBC COUNT: CPT | Performed by: PHYSICIAN ASSISTANT

## 2022-01-01 PROCEDURE — 85025 COMPLETE CBC W/AUTO DIFF WBC: CPT | Performed by: PHYSICIAN ASSISTANT

## 2022-01-01 PROCEDURE — 96361 HYDRATE IV INFUSION ADD-ON: CPT

## 2022-01-01 PROCEDURE — 86901 BLOOD TYPING SEROLOGIC RH(D): CPT | Performed by: PHYSICIAN ASSISTANT

## 2022-01-01 PROCEDURE — 120N000002 HC R&B MED SURG/OB UMMC

## 2022-01-01 PROCEDURE — 250N000011 HC RX IP 250 OP 636: Performed by: PHYSICIAN ASSISTANT

## 2022-01-01 PROCEDURE — 83605 ASSAY OF LACTIC ACID: CPT | Performed by: PHYSICIAN ASSISTANT

## 2022-01-01 PROCEDURE — 250N000009 HC RX 250: Performed by: EMERGENCY MEDICINE

## 2022-01-01 PROCEDURE — 258N000003 HC RX IP 258 OP 636: Performed by: PHYSICIAN ASSISTANT

## 2022-01-01 PROCEDURE — 250N000011 HC RX IP 250 OP 636: Performed by: ANESTHESIOLOGY

## 2022-01-01 PROCEDURE — 85007 BL SMEAR W/DIFF WBC COUNT: CPT | Performed by: EMERGENCY MEDICINE

## 2022-01-01 PROCEDURE — 36415 COLL VENOUS BLD VENIPUNCTURE: CPT | Performed by: INTERNAL MEDICINE

## 2022-01-01 PROCEDURE — 96367 TX/PROPH/DG ADDL SEQ IV INF: CPT

## 2022-01-01 PROCEDURE — 88304 TISSUE EXAM BY PATHOLOGIST: CPT | Mod: 26 | Performed by: PATHOLOGY

## 2022-01-01 PROCEDURE — 96360 HYDRATION IV INFUSION INIT: CPT

## 2022-01-01 PROCEDURE — 84100 ASSAY OF PHOSPHORUS: CPT

## 2022-01-01 PROCEDURE — 258N000003 HC RX IP 258 OP 636: Performed by: STUDENT IN AN ORGANIZED HEALTH CARE EDUCATION/TRAINING PROGRAM

## 2022-01-01 PROCEDURE — 83735 ASSAY OF MAGNESIUM: CPT | Performed by: PHYSICIAN ASSISTANT

## 2022-01-01 PROCEDURE — 84100 ASSAY OF PHOSPHORUS: CPT | Performed by: PHYSICIAN ASSISTANT

## 2022-01-01 PROCEDURE — 250N000011 HC RX IP 250 OP 636: Performed by: EMERGENCY MEDICINE

## 2022-01-01 PROCEDURE — G0463 HOSPITAL OUTPT CLINIC VISIT: HCPCS | Mod: PN,RTG | Performed by: PHYSICIAN ASSISTANT

## 2022-01-01 PROCEDURE — 47562 LAPAROSCOPIC CHOLECYSTECTOMY: CPT | Performed by: SURGERY

## 2022-01-01 PROCEDURE — 999N000181 XR SURGERY CARM FLUORO GREATER THAN 5 MIN W STILLS

## 2022-01-01 PROCEDURE — 250N000013 HC RX MED GY IP 250 OP 250 PS 637: Performed by: NURSE PRACTITIONER

## 2022-01-01 PROCEDURE — 80053 COMPREHEN METABOLIC PANEL: CPT | Performed by: PHYSICIAN ASSISTANT

## 2022-01-01 PROCEDURE — 31575 DIAGNOSTIC LARYNGOSCOPY: CPT | Performed by: OTOLARYNGOLOGY

## 2022-01-01 PROCEDURE — 80048 BASIC METABOLIC PNL TOTAL CA: CPT | Performed by: FAMILY MEDICINE

## 2022-01-01 PROCEDURE — 36591 DRAW BLOOD OFF VENOUS DEVICE: CPT | Performed by: NURSE PRACTITIONER

## 2022-01-01 PROCEDURE — 250N000013 HC RX MED GY IP 250 OP 250 PS 637: Performed by: FAMILY MEDICINE

## 2022-01-01 PROCEDURE — 82565 ASSAY OF CREATININE: CPT | Performed by: SURGERY

## 2022-01-01 PROCEDURE — 85014 HEMATOCRIT: CPT | Performed by: EMERGENCY MEDICINE

## 2022-01-01 PROCEDURE — 82040 ASSAY OF SERUM ALBUMIN: CPT | Performed by: PHYSICIAN ASSISTANT

## 2022-01-01 PROCEDURE — 86850 RBC ANTIBODY SCREEN: CPT | Performed by: PHYSICIAN ASSISTANT

## 2022-01-01 PROCEDURE — 96413 CHEMO IV INFUSION 1 HR: CPT

## 2022-01-01 PROCEDURE — 99215 OFFICE O/P EST HI 40 MIN: CPT | Performed by: STUDENT IN AN ORGANIZED HEALTH CARE EDUCATION/TRAINING PROGRAM

## 2022-01-01 PROCEDURE — 250N000011 HC RX IP 250 OP 636: Performed by: INTERNAL MEDICINE

## 2022-01-01 PROCEDURE — 74177 CT ABD & PELVIS W/CONTRAST: CPT | Mod: MG

## 2022-01-01 PROCEDURE — 250N000013 HC RX MED GY IP 250 OP 250 PS 637: Performed by: PHYSICIAN ASSISTANT

## 2022-01-01 PROCEDURE — 250N000013 HC RX MED GY IP 250 OP 250 PS 637: Performed by: SURGERY

## 2022-01-01 PROCEDURE — 36415 COLL VENOUS BLD VENIPUNCTURE: CPT

## 2022-01-01 PROCEDURE — 36591 DRAW BLOOD OFF VENOUS DEVICE: CPT | Performed by: PHYSICIAN ASSISTANT

## 2022-01-01 PROCEDURE — G0463 HOSPITAL OUTPT CLINIC VISIT: HCPCS

## 2022-01-01 PROCEDURE — 81001 URINALYSIS AUTO W/SCOPE: CPT | Performed by: EMERGENCY MEDICINE

## 2022-01-01 PROCEDURE — 99215 OFFICE O/P EST HI 40 MIN: CPT | Mod: 95 | Performed by: PHYSICIAN ASSISTANT

## 2022-01-01 PROCEDURE — 83735 ASSAY OF MAGNESIUM: CPT | Performed by: STUDENT IN AN ORGANIZED HEALTH CARE EDUCATION/TRAINING PROGRAM

## 2022-01-01 PROCEDURE — 84132 ASSAY OF SERUM POTASSIUM: CPT | Performed by: FAMILY MEDICINE

## 2022-01-01 PROCEDURE — 36591 DRAW BLOOD OFF VENOUS DEVICE: CPT

## 2022-01-01 PROCEDURE — 120N000001 HC R&B MED SURG/OB

## 2022-01-01 PROCEDURE — 99223 1ST HOSP IP/OBS HIGH 75: CPT | Mod: GC | Performed by: INTERNAL MEDICINE

## 2022-01-01 PROCEDURE — 97161 PT EVAL LOW COMPLEX 20 MIN: CPT | Mod: GP

## 2022-01-01 PROCEDURE — 80053 COMPREHEN METABOLIC PANEL: CPT

## 2022-01-01 PROCEDURE — 82310 ASSAY OF CALCIUM: CPT | Performed by: PHYSICIAN ASSISTANT

## 2022-01-01 PROCEDURE — 99223 1ST HOSP IP/OBS HIGH 75: CPT | Mod: AI | Performed by: INTERNAL MEDICINE

## 2022-01-01 PROCEDURE — 96375 TX/PRO/DX INJ NEW DRUG ADDON: CPT

## 2022-01-01 PROCEDURE — 36415 COLL VENOUS BLD VENIPUNCTURE: CPT | Performed by: EMERGENCY MEDICINE

## 2022-01-01 PROCEDURE — 999N000141 HC STATISTIC PRE-PROCEDURE NURSING ASSESSMENT: Performed by: INTERNAL MEDICINE

## 2022-01-01 PROCEDURE — 36430 TRANSFUSION BLD/BLD COMPNT: CPT

## 2022-01-01 PROCEDURE — G1010 CDSM STANSON: HCPCS

## 2022-01-01 PROCEDURE — 85018 HEMOGLOBIN: CPT | Performed by: INTERNAL MEDICINE

## 2022-01-01 PROCEDURE — 86923 COMPATIBILITY TEST ELECTRIC: CPT | Performed by: PHYSICIAN ASSISTANT

## 2022-01-01 PROCEDURE — 258N000003 HC RX IP 258 OP 636: Performed by: SURGERY

## 2022-01-01 PROCEDURE — 82040 ASSAY OF SERUM ALBUMIN: CPT

## 2022-01-01 PROCEDURE — 83605 ASSAY OF LACTIC ACID: CPT | Performed by: EMERGENCY MEDICINE

## 2022-01-01 PROCEDURE — 99214 OFFICE O/P EST MOD 30 MIN: CPT | Mod: 95 | Performed by: PHYSICIAN ASSISTANT

## 2022-01-01 PROCEDURE — 82550 ASSAY OF CK (CPK): CPT

## 2022-01-01 PROCEDURE — 36591 DRAW BLOOD OFF VENOUS DEVICE: CPT | Performed by: STUDENT IN AN ORGANIZED HEALTH CARE EDUCATION/TRAINING PROGRAM

## 2022-01-01 PROCEDURE — 250N000009 HC RX 250: Performed by: INTERNAL MEDICINE

## 2022-01-01 PROCEDURE — 96415 CHEMO IV INFUSION ADDL HR: CPT

## 2022-01-01 PROCEDURE — 43235 EGD DIAGNOSTIC BRUSH WASH: CPT | Performed by: INTERNAL MEDICINE

## 2022-01-01 PROCEDURE — 99214 OFFICE O/P EST MOD 30 MIN: CPT | Mod: 25 | Performed by: OTOLARYNGOLOGY

## 2022-01-01 PROCEDURE — 86901 BLOOD TYPING SEROLOGIC RH(D): CPT | Performed by: STUDENT IN AN ORGANIZED HEALTH CARE EDUCATION/TRAINING PROGRAM

## 2022-01-01 PROCEDURE — 84439 ASSAY OF FREE THYROXINE: CPT

## 2022-01-01 PROCEDURE — 36430 TRANSFUSION BLD/BLD COMPNT: CPT | Performed by: EMERGENCY MEDICINE

## 2022-01-01 PROCEDURE — 85027 COMPLETE CBC AUTOMATED: CPT | Performed by: STUDENT IN AN ORGANIZED HEALTH CARE EDUCATION/TRAINING PROGRAM

## 2022-01-01 PROCEDURE — 85007 BL SMEAR W/DIFF WBC COUNT: CPT | Performed by: INTERNAL MEDICINE

## 2022-01-01 PROCEDURE — 86923 COMPATIBILITY TEST ELECTRIC: CPT | Performed by: STUDENT IN AN ORGANIZED HEALTH CARE EDUCATION/TRAINING PROGRAM

## 2022-01-01 PROCEDURE — 84155 ASSAY OF PROTEIN SERUM: CPT | Performed by: PHYSICIAN ASSISTANT

## 2022-01-01 PROCEDURE — C9113 INJ PANTOPRAZOLE SODIUM, VIA: HCPCS | Performed by: EMERGENCY MEDICINE

## 2022-01-01 PROCEDURE — 96365 THER/PROPH/DIAG IV INF INIT: CPT

## 2022-01-01 PROCEDURE — 85027 COMPLETE CBC AUTOMATED: CPT | Performed by: FAMILY MEDICINE

## 2022-01-01 PROCEDURE — 250N000011 HC RX IP 250 OP 636: Performed by: SURGERY

## 2022-01-01 PROCEDURE — 76770 US EXAM ABDO BACK WALL COMP: CPT

## 2022-01-01 PROCEDURE — 83690 ASSAY OF LIPASE: CPT | Performed by: FAMILY MEDICINE

## 2022-01-01 PROCEDURE — 85027 COMPLETE CBC AUTOMATED: CPT | Performed by: EMERGENCY MEDICINE

## 2022-01-01 PROCEDURE — 85004 AUTOMATED DIFF WBC COUNT: CPT | Performed by: PHYSICIAN ASSISTANT

## 2022-01-01 PROCEDURE — 83605 ASSAY OF LACTIC ACID: CPT | Performed by: FAMILY MEDICINE

## 2022-01-01 PROCEDURE — 84155 ASSAY OF PROTEIN SERUM: CPT | Performed by: EMERGENCY MEDICINE

## 2022-01-01 PROCEDURE — 85027 COMPLETE CBC AUTOMATED: CPT | Performed by: INTERNAL MEDICINE

## 2022-01-01 PROCEDURE — 86850 RBC ANTIBODY SCREEN: CPT | Performed by: STUDENT IN AN ORGANIZED HEALTH CARE EDUCATION/TRAINING PROGRAM

## 2022-01-01 PROCEDURE — P9016 RBC LEUKOCYTES REDUCED: HCPCS | Performed by: EMERGENCY MEDICINE

## 2022-01-01 PROCEDURE — 99284 EMERGENCY DEPT VISIT MOD MDM: CPT | Mod: CS,25 | Performed by: FAMILY MEDICINE

## 2022-01-01 PROCEDURE — 99285 EMERGENCY DEPT VISIT HI MDM: CPT | Performed by: EMERGENCY MEDICINE

## 2022-01-01 PROCEDURE — C9803 HOPD COVID-19 SPEC COLLECT: HCPCS | Performed by: FAMILY MEDICINE

## 2022-01-01 PROCEDURE — 84100 ASSAY OF PHOSPHORUS: CPT | Performed by: STUDENT IN AN ORGANIZED HEALTH CARE EDUCATION/TRAINING PROGRAM

## 2022-01-01 PROCEDURE — 360N000082 HC SURGERY LEVEL 2 W/ FLUORO, PER MIN: Performed by: INTERNAL MEDICINE

## 2022-01-01 PROCEDURE — 36415 COLL VENOUS BLD VENIPUNCTURE: CPT | Performed by: FAMILY MEDICINE

## 2022-01-01 PROCEDURE — 93971 EXTREMITY STUDY: CPT | Mod: RT

## 2022-01-01 PROCEDURE — 96375 TX/PRO/DX INJ NEW DRUG ADDON: CPT | Performed by: EMERGENCY MEDICINE

## 2022-01-01 PROCEDURE — 710N000010 HC RECOVERY PHASE 1, LEVEL 2, PER MIN: Performed by: INTERNAL MEDICINE

## 2022-01-01 PROCEDURE — 85014 HEMATOCRIT: CPT | Performed by: FAMILY MEDICINE

## 2022-01-01 PROCEDURE — 85014 HEMATOCRIT: CPT | Performed by: PHYSICIAN ASSISTANT

## 2022-01-01 PROCEDURE — 250N000011 HC RX IP 250 OP 636: Performed by: FAMILY MEDICINE

## 2022-01-01 PROCEDURE — 85025 COMPLETE CBC W/AUTO DIFF WBC: CPT | Performed by: FAMILY MEDICINE

## 2022-01-01 PROCEDURE — U0003 INFECTIOUS AGENT DETECTION BY NUCLEIC ACID (DNA OR RNA); SEVERE ACUTE RESPIRATORY SYNDROME CORONAVIRUS 2 (SARS-COV-2) (CORONAVIRUS DISEASE [COVID-19]), AMPLIFIED PROBE TECHNIQUE, MAKING USE OF HIGH THROUGHPUT TECHNOLOGIES AS DESCRIBED BY CMS-2020-01-R: HCPCS | Performed by: EMERGENCY MEDICINE

## 2022-01-01 PROCEDURE — 36592 COLLECT BLOOD FROM PICC: CPT | Performed by: EMERGENCY MEDICINE

## 2022-01-01 PROCEDURE — 83735 ASSAY OF MAGNESIUM: CPT | Performed by: INTERNAL MEDICINE

## 2022-01-01 PROCEDURE — 96374 THER/PROPH/DIAG INJ IV PUSH: CPT | Mod: 59 | Performed by: EMERGENCY MEDICINE

## 2022-01-01 PROCEDURE — 87040 BLOOD CULTURE FOR BACTERIA: CPT | Performed by: EMERGENCY MEDICINE

## 2022-01-01 PROCEDURE — 99232 SBSQ HOSP IP/OBS MODERATE 35: CPT | Performed by: FAMILY MEDICINE

## 2022-01-01 PROCEDURE — G0498 CHEMO EXTEND IV INFUS W/PUMP: HCPCS

## 2022-01-01 PROCEDURE — 85025 COMPLETE CBC W/AUTO DIFF WBC: CPT | Performed by: INTERNAL MEDICINE

## 2022-01-01 PROCEDURE — 99233 SBSQ HOSP IP/OBS HIGH 50: CPT | Performed by: INTERNAL MEDICINE

## 2022-01-01 PROCEDURE — 258N000003 HC RX IP 258 OP 636: Performed by: ANESTHESIOLOGY

## 2022-01-01 PROCEDURE — U0003 INFECTIOUS AGENT DETECTION BY NUCLEIC ACID (DNA OR RNA); SEVERE ACUTE RESPIRATORY SYNDROME CORONAVIRUS 2 (SARS-COV-2) (CORONAVIRUS DISEASE [COVID-19]), AMPLIFIED PROBE TECHNIQUE, MAKING USE OF HIGH THROUGHPUT TECHNOLOGIES AS DESCRIBED BY CMS-2020-01-R: HCPCS

## 2022-01-01 PROCEDURE — 255N000002 HC RX 255 OP 636: Performed by: INTERNAL MEDICINE

## 2022-01-01 PROCEDURE — 83735 ASSAY OF MAGNESIUM: CPT | Performed by: FAMILY MEDICINE

## 2022-01-01 PROCEDURE — G0463 HOSPITAL OUTPT CLINIC VISIT: HCPCS | Mod: PN,RTG,25 | Performed by: PHYSICIAN ASSISTANT

## 2022-01-01 PROCEDURE — 84244 ASSAY OF RENIN: CPT

## 2022-01-01 PROCEDURE — 99214 OFFICE O/P EST MOD 30 MIN: CPT | Mod: 95 | Performed by: INTERNAL MEDICINE

## 2022-01-01 PROCEDURE — 71046 X-RAY EXAM CHEST 2 VIEWS: CPT

## 2022-01-01 PROCEDURE — 85027 COMPLETE CBC AUTOMATED: CPT | Performed by: NURSE PRACTITIONER

## 2022-01-01 PROCEDURE — 96376 TX/PRO/DX INJ SAME DRUG ADON: CPT | Performed by: EMERGENCY MEDICINE

## 2022-01-01 PROCEDURE — 99284 EMERGENCY DEPT VISIT MOD MDM: CPT | Performed by: EMERGENCY MEDICINE

## 2022-01-01 PROCEDURE — A9585 GADOBUTROL INJECTION: HCPCS | Performed by: EMERGENCY MEDICINE

## 2022-01-01 PROCEDURE — 87040 BLOOD CULTURE FOR BACTERIA: CPT | Mod: 59

## 2022-01-01 PROCEDURE — C1769 GUIDE WIRE: HCPCS | Performed by: INTERNAL MEDICINE

## 2022-01-01 PROCEDURE — 74177 CT ABD & PELVIS W/CONTRAST: CPT | Mod: MA

## 2022-01-01 PROCEDURE — 99239 HOSP IP/OBS DSCHRG MGMT >30: CPT | Performed by: STUDENT IN AN ORGANIZED HEALTH CARE EDUCATION/TRAINING PROGRAM

## 2022-01-01 PROCEDURE — 0FC98ZZ EXTIRPATION OF MATTER FROM COMMON BILE DUCT, VIA NATURAL OR ARTIFICIAL OPENING ENDOSCOPIC: ICD-10-PCS | Performed by: INTERNAL MEDICINE

## 2022-01-01 PROCEDURE — 80053 COMPREHEN METABOLIC PANEL: CPT | Performed by: FAMILY MEDICINE

## 2022-01-01 PROCEDURE — 96365 THER/PROPH/DIAG IV INF INIT: CPT | Mod: 59 | Performed by: EMERGENCY MEDICINE

## 2022-01-01 PROCEDURE — 250N000025 HC SEVOFLURANE, PER MIN: Performed by: INTERNAL MEDICINE

## 2022-01-01 PROCEDURE — G0463 HOSPITAL OUTPT CLINIC VISIT: HCPCS | Mod: PN,GT | Performed by: PHYSICIAN ASSISTANT

## 2022-01-01 PROCEDURE — 82272 OCCULT BLD FECES 1-3 TESTS: CPT | Performed by: EMERGENCY MEDICINE

## 2022-01-01 PROCEDURE — 250N000009 HC RX 250: Performed by: NURSE ANESTHETIST, CERTIFIED REGISTERED

## 2022-01-01 PROCEDURE — 258N000003 HC RX IP 258 OP 636: Performed by: INTERNAL MEDICINE

## 2022-01-01 PROCEDURE — 80053 COMPREHEN METABOLIC PANEL: CPT | Performed by: INTERNAL MEDICINE

## 2022-01-01 PROCEDURE — 87635 SARS-COV-2 COVID-19 AMP PRB: CPT | Performed by: FAMILY MEDICINE

## 2022-01-01 PROCEDURE — 250N000011 HC RX IP 250 OP 636: Performed by: RADIOLOGY

## 2022-01-01 PROCEDURE — 85025 COMPLETE CBC W/AUTO DIFF WBC: CPT

## 2022-01-01 PROCEDURE — 99285 EMERGENCY DEPT VISIT HI MDM: CPT | Mod: CS | Performed by: EMERGENCY MEDICINE

## 2022-01-01 PROCEDURE — 250N000009 HC RX 250: Performed by: NURSE PRACTITIONER

## 2022-01-01 PROCEDURE — 250N000011 HC RX IP 250 OP 636

## 2022-01-01 PROCEDURE — 87636 SARSCOV2 & INF A&B AMP PRB: CPT | Performed by: PHYSICIAN ASSISTANT

## 2022-01-01 PROCEDURE — 87040 BLOOD CULTURE FOR BACTERIA: CPT | Performed by: FAMILY MEDICINE

## 2022-01-01 PROCEDURE — 85041 AUTOMATED RBC COUNT: CPT | Performed by: PHYSICIAN ASSISTANT

## 2022-01-01 PROCEDURE — 85007 BL SMEAR W/DIFF WBC COUNT: CPT | Performed by: STUDENT IN AN ORGANIZED HEALTH CARE EDUCATION/TRAINING PROGRAM

## 2022-01-01 PROCEDURE — 81001 URINALYSIS AUTO W/SCOPE: CPT | Performed by: FAMILY MEDICINE

## 2022-01-01 PROCEDURE — 96361 HYDRATE IV INFUSION ADD-ON: CPT | Performed by: FAMILY MEDICINE

## 2022-01-01 PROCEDURE — 250N000011 HC RX IP 250 OP 636: Performed by: NURSE PRACTITIONER

## 2022-01-01 PROCEDURE — 83735 ASSAY OF MAGNESIUM: CPT

## 2022-01-01 PROCEDURE — 86140 C-REACTIVE PROTEIN: CPT | Performed by: FAMILY MEDICINE

## 2022-01-01 PROCEDURE — 96523 IRRIG DRUG DELIVERY DEVICE: CPT

## 2022-01-01 PROCEDURE — P9016 RBC LEUKOCYTES REDUCED: HCPCS | Performed by: PHYSICIAN ASSISTANT

## 2022-01-01 PROCEDURE — 96374 THER/PROPH/DIAG INJ IV PUSH: CPT

## 2022-01-01 PROCEDURE — 370N000017 HC ANESTHESIA TECHNICAL FEE, PER MIN: Performed by: SURGERY

## 2022-01-01 PROCEDURE — 710N000009 HC RECOVERY PHASE 1, LEVEL 1, PER MIN: Performed by: SURGERY

## 2022-01-01 PROCEDURE — 84100 ASSAY OF PHOSPHORUS: CPT | Performed by: FAMILY MEDICINE

## 2022-01-01 PROCEDURE — 86923 COMPATIBILITY TEST ELECTRIC: CPT | Performed by: EMERGENCY MEDICINE

## 2022-01-01 PROCEDURE — 258N000003 HC RX IP 258 OP 636: Performed by: NURSE PRACTITIONER

## 2022-01-01 PROCEDURE — 84450 TRANSFERASE (AST) (SGOT): CPT | Performed by: EMERGENCY MEDICINE

## 2022-01-01 PROCEDURE — 250N000009 HC RX 250: Performed by: PHYSICIAN ASSISTANT

## 2022-01-01 PROCEDURE — 258N000003 HC RX IP 258 OP 636: Performed by: FAMILY MEDICINE

## 2022-01-01 PROCEDURE — 250N000025 HC SEVOFLURANE, PER MIN: Performed by: SURGERY

## 2022-01-01 PROCEDURE — 0FT44ZZ RESECTION OF GALLBLADDER, PERCUTANEOUS ENDOSCOPIC APPROACH: ICD-10-PCS | Performed by: SURGERY

## 2022-01-01 PROCEDURE — P9016 RBC LEUKOCYTES REDUCED: HCPCS | Performed by: STUDENT IN AN ORGANIZED HEALTH CARE EDUCATION/TRAINING PROGRAM

## 2022-01-01 PROCEDURE — 99285 EMERGENCY DEPT VISIT HI MDM: CPT | Mod: CS,25 | Performed by: EMERGENCY MEDICINE

## 2022-01-01 PROCEDURE — 85027 COMPLETE CBC AUTOMATED: CPT

## 2022-01-01 PROCEDURE — 82374 ASSAY BLOOD CARBON DIOXIDE: CPT | Performed by: PHYSICIAN ASSISTANT

## 2022-01-01 PROCEDURE — 99291 CRITICAL CARE FIRST HOUR: CPT | Mod: 25 | Performed by: EMERGENCY MEDICINE

## 2022-01-01 PROCEDURE — 250N000013 HC RX MED GY IP 250 OP 250 PS 637: Performed by: EMERGENCY MEDICINE

## 2022-01-01 PROCEDURE — 99284 EMERGENCY DEPT VISIT MOD MDM: CPT | Mod: CS | Performed by: FAMILY MEDICINE

## 2022-01-01 PROCEDURE — 86901 BLOOD TYPING SEROLOGIC RH(D): CPT | Performed by: EMERGENCY MEDICINE

## 2022-01-01 PROCEDURE — 96360 HYDRATION IV INFUSION INIT: CPT | Performed by: FAMILY MEDICINE

## 2022-01-01 PROCEDURE — 82550 ASSAY OF CK (CPK): CPT | Performed by: STUDENT IN AN ORGANIZED HEALTH CARE EDUCATION/TRAINING PROGRAM

## 2022-01-01 PROCEDURE — 99221 1ST HOSP IP/OBS SF/LOW 40: CPT | Mod: 57 | Performed by: SURGERY

## 2022-01-01 PROCEDURE — 0DJ08ZZ INSPECTION OF UPPER INTESTINAL TRACT, VIA NATURAL OR ARTIFICIAL OPENING ENDOSCOPIC: ICD-10-PCS | Performed by: INTERNAL MEDICINE

## 2022-01-01 PROCEDURE — 84145 PROCALCITONIN (PCT): CPT | Performed by: EMERGENCY MEDICINE

## 2022-01-01 PROCEDURE — 87040 BLOOD CULTURE FOR BACTERIA: CPT | Performed by: PHYSICIAN ASSISTANT

## 2022-01-01 PROCEDURE — 250N000009 HC RX 250: Performed by: ANESTHESIOLOGY

## 2022-01-01 PROCEDURE — 250N000011 HC RX IP 250 OP 636: Performed by: NURSE ANESTHETIST, CERTIFIED REGISTERED

## 2022-01-01 PROCEDURE — 99239 HOSP IP/OBS DSCHRG MGMT >30: CPT | Performed by: INTERNAL MEDICINE

## 2022-01-01 PROCEDURE — 93970 EXTREMITY STUDY: CPT

## 2022-01-01 PROCEDURE — 250N000009 HC RX 250: Performed by: RADIOLOGY

## 2022-01-01 PROCEDURE — U0005 INFEC AGEN DETEC AMPLI PROBE: HCPCS

## 2022-01-01 PROCEDURE — 82040 ASSAY OF SERUM ALBUMIN: CPT | Performed by: STUDENT IN AN ORGANIZED HEALTH CARE EDUCATION/TRAINING PROGRAM

## 2022-01-01 PROCEDURE — 99207 PR APP CREDIT; MD BILLING SHARED VISIT: CPT | Mod: AI | Performed by: NURSE PRACTITIONER

## 2022-01-01 PROCEDURE — 999N000141 HC STATISTIC PRE-PROCEDURE NURSING ASSESSMENT: Performed by: SURGERY

## 2022-01-01 PROCEDURE — 250N000013 HC RX MED GY IP 250 OP 250 PS 637: Performed by: STUDENT IN AN ORGANIZED HEALTH CARE EDUCATION/TRAINING PROGRAM

## 2022-01-01 PROCEDURE — 272N000001 HC OR GENERAL SUPPLY STERILE: Performed by: SURGERY

## 2022-01-01 PROCEDURE — 80053 COMPREHEN METABOLIC PANEL: CPT | Performed by: STUDENT IN AN ORGANIZED HEALTH CARE EDUCATION/TRAINING PROGRAM

## 2022-01-01 PROCEDURE — 85018 HEMOGLOBIN: CPT | Performed by: EMERGENCY MEDICINE

## 2022-01-01 PROCEDURE — 99215 OFFICE O/P EST HI 40 MIN: CPT | Performed by: NURSE PRACTITIONER

## 2022-01-01 PROCEDURE — 86850 RBC ANTIBODY SCREEN: CPT | Performed by: EMERGENCY MEDICINE

## 2022-01-01 PROCEDURE — 88304 TISSUE EXAM BY PATHOLOGIST: CPT | Mod: TC | Performed by: SURGERY

## 2022-01-01 PROCEDURE — 81001 URINALYSIS AUTO W/SCOPE: CPT | Performed by: PHYSICIAN ASSISTANT

## 2022-01-01 PROCEDURE — 99232 SBSQ HOSP IP/OBS MODERATE 35: CPT | Performed by: NURSE PRACTITIONER

## 2022-01-01 PROCEDURE — 999N000099 HC STATISTIC MODERATE SEDATION < 10 MIN: Performed by: INTERNAL MEDICINE

## 2022-01-01 PROCEDURE — 84450 TRANSFERASE (AST) (SGOT): CPT | Performed by: PHYSICIAN ASSISTANT

## 2022-01-01 PROCEDURE — 83690 ASSAY OF LIPASE: CPT | Performed by: INTERNAL MEDICINE

## 2022-01-01 PROCEDURE — C1726 CATH, BAL DIL, NON-VASCULAR: HCPCS | Performed by: INTERNAL MEDICINE

## 2022-01-01 PROCEDURE — 82248 BILIRUBIN DIRECT: CPT | Performed by: NURSE PRACTITIONER

## 2022-01-01 PROCEDURE — 99213 OFFICE O/P EST LOW 20 MIN: CPT | Mod: 95 | Performed by: PHYSICIAN ASSISTANT

## 2022-01-01 PROCEDURE — C9803 HOPD COVID-19 SPEC COLLECT: HCPCS | Performed by: EMERGENCY MEDICINE

## 2022-01-01 PROCEDURE — 360N000076 HC SURGERY LEVEL 3, PER MIN: Performed by: SURGERY

## 2022-01-01 PROCEDURE — 80053 COMPREHEN METABOLIC PANEL: CPT | Performed by: NURSE PRACTITIONER

## 2022-01-01 PROCEDURE — 250N000011 HC RX IP 250 OP 636: Mod: JW | Performed by: PHYSICIAN ASSISTANT

## 2022-01-01 PROCEDURE — 96361 HYDRATE IV INFUSION ADD-ON: CPT | Performed by: EMERGENCY MEDICINE

## 2022-01-01 PROCEDURE — 255N000002 HC RX 255 OP 636: Performed by: EMERGENCY MEDICINE

## 2022-01-01 PROCEDURE — 99221 1ST HOSP IP/OBS SF/LOW 40: CPT | Mod: 24 | Performed by: PHYSICIAN ASSISTANT

## 2022-01-01 PROCEDURE — 99215 OFFICE O/P EST HI 40 MIN: CPT | Mod: 95 | Performed by: INTERNAL MEDICINE

## 2022-01-01 PROCEDURE — 99239 HOSP IP/OBS DSCHRG MGMT >30: CPT | Performed by: EMERGENCY MEDICINE

## 2022-01-01 PROCEDURE — 76705 ECHO EXAM OF ABDOMEN: CPT

## 2022-01-01 PROCEDURE — 80053 COMPREHEN METABOLIC PANEL: CPT | Performed by: EMERGENCY MEDICINE

## 2022-01-01 PROCEDURE — 370N000017 HC ANESTHESIA TECHNICAL FEE, PER MIN: Performed by: INTERNAL MEDICINE

## 2022-01-01 PROCEDURE — 99233 SBSQ HOSP IP/OBS HIGH 50: CPT | Performed by: EMERGENCY MEDICINE

## 2022-01-01 PROCEDURE — G0463 HOSPITAL OUTPT CLINIC VISIT: HCPCS | Mod: 25

## 2022-01-01 RX ORDER — HEPARIN SODIUM (PORCINE) LOCK FLUSH IV SOLN 100 UNIT/ML 100 UNIT/ML
5 SOLUTION INTRAVENOUS
Status: CANCELLED
Start: 2022-01-01

## 2022-01-01 RX ORDER — LEVOTHYROXINE SODIUM 25 UG/1
75 TABLET ORAL
Status: DISCONTINUED | OUTPATIENT
Start: 2022-01-01 | End: 2022-01-01 | Stop reason: HOSPADM

## 2022-01-01 RX ORDER — DIAZEPAM 10 MG/2ML
2 INJECTION, SOLUTION INTRAMUSCULAR; INTRAVENOUS
Status: COMPLETED | OUTPATIENT
Start: 2022-01-01 | End: 2022-01-01

## 2022-01-01 RX ORDER — ALBUTEROL SULFATE 90 UG/1
1-2 AEROSOL, METERED RESPIRATORY (INHALATION)
Status: CANCELLED | OUTPATIENT
Start: 2022-01-01

## 2022-01-01 RX ORDER — MEPERIDINE HYDROCHLORIDE 25 MG/ML
25 INJECTION INTRAMUSCULAR; INTRAVENOUS; SUBCUTANEOUS EVERY 30 MIN PRN
Status: CANCELLED | OUTPATIENT
Start: 2022-01-01

## 2022-01-01 RX ORDER — HEPARIN SODIUM (PORCINE) LOCK FLUSH IV SOLN 100 UNIT/ML 100 UNIT/ML
5 SOLUTION INTRAVENOUS
Status: COMPLETED | OUTPATIENT
Start: 2022-01-01 | End: 2022-01-01

## 2022-01-01 RX ORDER — MEROPENEM 1 G/1
1 INJECTION, POWDER, FOR SOLUTION INTRAVENOUS EVERY 12 HOURS
Status: DISCONTINUED | OUTPATIENT
Start: 2022-01-01 | End: 2022-01-01 | Stop reason: HOSPADM

## 2022-01-01 RX ORDER — NALOXONE HYDROCHLORIDE 0.4 MG/ML
0.2 INJECTION, SOLUTION INTRAMUSCULAR; INTRAVENOUS; SUBCUTANEOUS
Status: DISCONTINUED | OUTPATIENT
Start: 2022-01-01 | End: 2022-01-01 | Stop reason: HOSPADM

## 2022-01-01 RX ORDER — ACETAMINOPHEN 325 MG/1
650 TABLET ORAL EVERY 6 HOURS PRN
COMMUNITY
Start: 2022-01-01

## 2022-01-01 RX ORDER — ONDANSETRON 2 MG/ML
8 INJECTION INTRAMUSCULAR; INTRAVENOUS ONCE
Status: COMPLETED | OUTPATIENT
Start: 2022-01-01 | End: 2022-01-01

## 2022-01-01 RX ORDER — HEPARIN SODIUM (PORCINE) LOCK FLUSH IV SOLN 100 UNIT/ML 100 UNIT/ML
500 SOLUTION INTRAVENOUS ONCE
Status: COMPLETED | OUTPATIENT
Start: 2022-01-01 | End: 2022-01-01

## 2022-01-01 RX ORDER — NALOXONE HYDROCHLORIDE 0.4 MG/ML
0.4 INJECTION, SOLUTION INTRAMUSCULAR; INTRAVENOUS; SUBCUTANEOUS
Status: DISCONTINUED | OUTPATIENT
Start: 2022-01-01 | End: 2022-01-01 | Stop reason: HOSPADM

## 2022-01-01 RX ORDER — FENTANYL CITRATE 50 UG/ML
25 INJECTION, SOLUTION INTRAMUSCULAR; INTRAVENOUS EVERY 5 MIN PRN
Status: DISCONTINUED | OUTPATIENT
Start: 2022-01-01 | End: 2022-01-01 | Stop reason: HOSPADM

## 2022-01-01 RX ORDER — HEPARIN SODIUM (PORCINE) LOCK FLUSH IV SOLN 100 UNIT/ML 100 UNIT/ML
SOLUTION INTRAVENOUS
Status: DISCONTINUED
Start: 2022-01-01 | End: 2022-01-01 | Stop reason: HOSPADM

## 2022-01-01 RX ORDER — ACETAMINOPHEN 650 MG/1
650 SUPPOSITORY RECTAL EVERY 6 HOURS PRN
Status: DISCONTINUED | OUTPATIENT
Start: 2022-01-01 | End: 2022-01-01

## 2022-01-01 RX ORDER — SODIUM CHLORIDE, SODIUM LACTATE, POTASSIUM CHLORIDE, CALCIUM CHLORIDE 600; 310; 30; 20 MG/100ML; MG/100ML; MG/100ML; MG/100ML
INJECTION, SOLUTION INTRAVENOUS CONTINUOUS
Status: DISCONTINUED | OUTPATIENT
Start: 2022-01-01 | End: 2022-01-01 | Stop reason: HOSPADM

## 2022-01-01 RX ORDER — DIPHENHYDRAMINE HYDROCHLORIDE 50 MG/ML
50 INJECTION INTRAMUSCULAR; INTRAVENOUS
Status: CANCELLED
Start: 2022-01-01

## 2022-01-01 RX ORDER — ONDANSETRON 2 MG/ML
8 INJECTION INTRAMUSCULAR; INTRAVENOUS ONCE
Status: CANCELLED
Start: 2022-01-01 | End: 2022-01-01

## 2022-01-01 RX ORDER — ESCITALOPRAM OXALATE 10 MG/1
10 TABLET ORAL DAILY
Qty: 90 TABLET | Refills: 3 | Status: ON HOLD | OUTPATIENT
Start: 2022-01-01 | End: 2022-01-01

## 2022-01-01 RX ORDER — IOPAMIDOL 755 MG/ML
74 INJECTION, SOLUTION INTRAVASCULAR ONCE
Status: COMPLETED | OUTPATIENT
Start: 2022-01-01 | End: 2022-01-01

## 2022-01-01 RX ORDER — BISACODYL 10 MG
10 SUPPOSITORY, RECTAL RECTAL DAILY PRN
Status: DISCONTINUED | OUTPATIENT
Start: 2022-01-01 | End: 2022-01-01 | Stop reason: HOSPADM

## 2022-01-01 RX ORDER — ESCITALOPRAM OXALATE 10 MG/1
10 TABLET ORAL DAILY
Status: DISCONTINUED | OUTPATIENT
Start: 2022-01-01 | End: 2022-01-01 | Stop reason: HOSPADM

## 2022-01-01 RX ORDER — LIDOCAINE 40 MG/G
CREAM TOPICAL
Status: DISCONTINUED | OUTPATIENT
Start: 2022-01-01 | End: 2022-01-01 | Stop reason: HOSPADM

## 2022-01-01 RX ORDER — OXYCODONE HYDROCHLORIDE 5 MG/1
5 TABLET ORAL EVERY 4 HOURS PRN
Status: DISCONTINUED | OUTPATIENT
Start: 2022-01-01 | End: 2022-01-01 | Stop reason: HOSPADM

## 2022-01-01 RX ORDER — ONDANSETRON 2 MG/ML
8 INJECTION INTRAMUSCULAR; INTRAVENOUS ONCE
Status: CANCELLED | OUTPATIENT
Start: 2022-01-01

## 2022-01-01 RX ORDER — EPINEPHRINE 1 MG/ML
0.3 INJECTION, SOLUTION INTRAMUSCULAR; SUBCUTANEOUS EVERY 5 MIN PRN
Status: CANCELLED | OUTPATIENT
Start: 2022-01-01

## 2022-01-01 RX ORDER — METHYLPREDNISOLONE SODIUM SUCCINATE 125 MG/2ML
125 INJECTION, POWDER, LYOPHILIZED, FOR SOLUTION INTRAMUSCULAR; INTRAVENOUS
Status: CANCELLED | OUTPATIENT
Start: 2022-01-01

## 2022-01-01 RX ORDER — ONDANSETRON 2 MG/ML
4 INJECTION INTRAMUSCULAR; INTRAVENOUS EVERY 30 MIN PRN
Status: DISCONTINUED | OUTPATIENT
Start: 2022-01-01 | End: 2022-01-01 | Stop reason: HOSPADM

## 2022-01-01 RX ORDER — HEPARIN SODIUM (PORCINE) LOCK FLUSH IV SOLN 100 UNIT/ML 100 UNIT/ML
5 SOLUTION INTRAVENOUS
Status: CANCELLED | OUTPATIENT
Start: 2022-01-01

## 2022-01-01 RX ORDER — AMLODIPINE BESYLATE 5 MG/1
5 TABLET ORAL DAILY
Status: DISCONTINUED | OUTPATIENT
Start: 2022-01-01 | End: 2022-01-01

## 2022-01-01 RX ORDER — ACETAMINOPHEN 325 MG/1
650 TABLET ORAL 3 TIMES DAILY
Status: DISCONTINUED | OUTPATIENT
Start: 2022-01-01 | End: 2022-01-01 | Stop reason: HOSPADM

## 2022-01-01 RX ORDER — MAGNESIUM OXIDE 400 MG/1
400 TABLET ORAL EVERY 4 HOURS
Status: COMPLETED | OUTPATIENT
Start: 2022-01-01 | End: 2022-01-01

## 2022-01-01 RX ORDER — HALOPERIDOL 5 MG/ML
1 INJECTION INTRAMUSCULAR
Status: COMPLETED | OUTPATIENT
Start: 2022-01-01 | End: 2022-01-01

## 2022-01-01 RX ORDER — LIDOCAINE 50 MG/G
OINTMENT TOPICAL 4 TIMES DAILY
COMMUNITY
Start: 2022-01-01 | End: 2023-01-01

## 2022-01-01 RX ORDER — CLINDAMYCIN HCL 300 MG
300 CAPSULE ORAL 3 TIMES DAILY
Qty: 21 CAPSULE | Refills: 0 | Status: SHIPPED | OUTPATIENT
Start: 2022-01-01 | End: 2022-01-01

## 2022-01-01 RX ORDER — LISINOPRIL 20 MG/1
20 TABLET ORAL DAILY
Status: DISCONTINUED | OUTPATIENT
Start: 2022-01-01 | End: 2022-01-01 | Stop reason: HOSPADM

## 2022-01-01 RX ORDER — PROCHLORPERAZINE MALEATE 5 MG
5 TABLET ORAL EVERY 6 HOURS PRN
Status: DISCONTINUED | OUTPATIENT
Start: 2022-01-01 | End: 2022-01-01 | Stop reason: HOSPADM

## 2022-01-01 RX ORDER — DEXAMETHASONE 0.5 MG/5ML
0.5 ELIXIR ORAL 2 TIMES DAILY
Qty: 237 ML | Refills: 0 | Status: SHIPPED | OUTPATIENT
Start: 2022-01-01 | End: 2022-01-01

## 2022-01-01 RX ORDER — ALBUTEROL SULFATE 0.83 MG/ML
2.5 SOLUTION RESPIRATORY (INHALATION)
Status: CANCELLED | OUTPATIENT
Start: 2022-01-01

## 2022-01-01 RX ORDER — IOPAMIDOL 755 MG/ML
63 INJECTION, SOLUTION INTRAVASCULAR ONCE
Status: COMPLETED | OUTPATIENT
Start: 2022-01-01 | End: 2022-01-01

## 2022-01-01 RX ORDER — HEPARIN SODIUM (PORCINE) LOCK FLUSH IV SOLN 100 UNIT/ML 100 UNIT/ML
5 SOLUTION INTRAVENOUS
Status: DISCONTINUED | OUTPATIENT
Start: 2022-01-01 | End: 2022-01-01 | Stop reason: HOSPADM

## 2022-01-01 RX ORDER — HEPARIN SODIUM (PORCINE) LOCK FLUSH IV SOLN 100 UNIT/ML 100 UNIT/ML
SOLUTION INTRAVENOUS
Status: COMPLETED
Start: 2022-01-01 | End: 2022-01-01

## 2022-01-01 RX ORDER — DEXAMETHASONE SODIUM PHOSPHATE 10 MG/ML
INJECTION, SOLUTION INTRAMUSCULAR; INTRAVENOUS PRN
Status: DISCONTINUED | OUTPATIENT
Start: 2022-01-01 | End: 2022-01-01

## 2022-01-01 RX ORDER — ESCITALOPRAM OXALATE 10 MG/1
10 TABLET ORAL DAILY
Status: DISCONTINUED | OUTPATIENT
Start: 2022-01-01 | End: 2022-01-01

## 2022-01-01 RX ORDER — HEPARIN SODIUM,PORCINE 10 UNIT/ML
5 VIAL (ML) INTRAVENOUS
Status: CANCELLED | OUTPATIENT
Start: 2022-01-01

## 2022-01-01 RX ORDER — HYDROCODONE BITARTRATE AND ACETAMINOPHEN 5; 325 MG/1; MG/1
1 TABLET ORAL EVERY 6 HOURS PRN
Qty: 10 TABLET | Refills: 0 | Status: ON HOLD | OUTPATIENT
Start: 2022-01-01 | End: 2022-01-01

## 2022-01-01 RX ORDER — LISINOPRIL 20 MG/1
20 TABLET ORAL DAILY
Qty: 30 TABLET | Refills: 0 | Status: SHIPPED | OUTPATIENT
Start: 2022-01-01 | End: 2023-01-01

## 2022-01-01 RX ORDER — LIDOCAINE 4 G/G
1 PATCH TOPICAL DAILY PRN
Status: DISCONTINUED | OUTPATIENT
Start: 2022-01-01 | End: 2022-01-01 | Stop reason: HOSPADM

## 2022-01-01 RX ORDER — OXYCODONE HYDROCHLORIDE 5 MG/1
5-10 TABLET ORAL EVERY 4 HOURS PRN
Status: DISCONTINUED | OUTPATIENT
Start: 2022-01-01 | End: 2022-01-01

## 2022-01-01 RX ORDER — FENTANYL CITRATE 50 UG/ML
50 INJECTION, SOLUTION INTRAMUSCULAR; INTRAVENOUS EVERY 5 MIN PRN
Status: DISCONTINUED | OUTPATIENT
Start: 2022-01-01 | End: 2022-01-01 | Stop reason: HOSPADM

## 2022-01-01 RX ORDER — ONDANSETRON 4 MG/1
4 TABLET, ORALLY DISINTEGRATING ORAL EVERY 6 HOURS PRN
Status: DISCONTINUED | OUTPATIENT
Start: 2022-01-01 | End: 2022-01-01 | Stop reason: HOSPADM

## 2022-01-01 RX ORDER — CELECOXIB 200 MG/1
200 CAPSULE ORAL 2 TIMES DAILY
Qty: 60 CAPSULE | Refills: 3 | Status: ON HOLD | OUTPATIENT
Start: 2022-01-01 | End: 2022-01-01

## 2022-01-01 RX ORDER — METHYLPREDNISOLONE SODIUM SUCCINATE 125 MG/2ML
125 INJECTION, POWDER, LYOPHILIZED, FOR SOLUTION INTRAMUSCULAR; INTRAVENOUS
Status: CANCELLED
Start: 2022-01-01

## 2022-01-01 RX ORDER — SODIUM CHLORIDE, SODIUM LACTATE, POTASSIUM CHLORIDE, AND CALCIUM CHLORIDE .6; .31; .03; .02 G/100ML; G/100ML; G/100ML; G/100ML
IRRIGANT IRRIGATION PRN
Status: DISCONTINUED | OUTPATIENT
Start: 2022-01-01 | End: 2022-01-01 | Stop reason: HOSPADM

## 2022-01-01 RX ORDER — POTASSIUM CHLORIDE 1500 MG/1
40 TABLET, EXTENDED RELEASE ORAL ONCE
Status: COMPLETED | OUTPATIENT
Start: 2022-01-01 | End: 2022-01-01

## 2022-01-01 RX ORDER — ENOXAPARIN SODIUM 100 MG/ML
40 INJECTION SUBCUTANEOUS EVERY 24 HOURS
Status: DISCONTINUED | OUTPATIENT
Start: 2022-01-01 | End: 2022-01-01 | Stop reason: HOSPADM

## 2022-01-01 RX ORDER — ALBUTEROL SULFATE 90 UG/1
1-2 AEROSOL, METERED RESPIRATORY (INHALATION)
Status: DISCONTINUED | OUTPATIENT
Start: 2022-01-01 | End: 2022-01-01 | Stop reason: HOSPADM

## 2022-01-01 RX ORDER — PANTOPRAZOLE SODIUM 40 MG/1
40 TABLET, DELAYED RELEASE ORAL 2 TIMES DAILY
Status: DISCONTINUED | OUTPATIENT
Start: 2022-01-01 | End: 2022-01-01 | Stop reason: HOSPADM

## 2022-01-01 RX ORDER — FENTANYL CITRATE 50 UG/ML
25 INJECTION, SOLUTION INTRAMUSCULAR; INTRAVENOUS
Status: DISCONTINUED | OUTPATIENT
Start: 2022-01-01 | End: 2022-01-01 | Stop reason: HOSPADM

## 2022-01-01 RX ORDER — LEVOTHYROXINE SODIUM 25 UG/1
75 TABLET ORAL EVERY MORNING
Status: DISCONTINUED | OUTPATIENT
Start: 2022-01-01 | End: 2022-01-01 | Stop reason: HOSPADM

## 2022-01-01 RX ORDER — PAZOPANIB 200 MG/1
800 TABLET, FILM COATED ORAL DAILY
Qty: 120 TABLET | Refills: 0 | Status: SHIPPED | OUTPATIENT
Start: 2022-01-01 | End: 2023-01-01

## 2022-01-01 RX ORDER — BUPIVACAINE HYDROCHLORIDE 2.5 MG/ML
INJECTION, SOLUTION INFILTRATION; PERINEURAL PRN
Status: DISCONTINUED | OUTPATIENT
Start: 2022-01-01 | End: 2022-01-01 | Stop reason: HOSPADM

## 2022-01-01 RX ORDER — PROCHLORPERAZINE MALEATE 5 MG
5 TABLET ORAL EVERY 6 HOURS PRN
Qty: 30 TABLET | Refills: 1 | Status: SHIPPED | OUTPATIENT
Start: 2022-01-01 | End: 2023-01-01

## 2022-01-01 RX ORDER — HYDROCORTISONE 20 MG/1
20 TABLET ORAL
Status: DISCONTINUED | OUTPATIENT
Start: 2022-01-01 | End: 2022-01-01 | Stop reason: HOSPADM

## 2022-01-01 RX ORDER — NYSTATIN 100000/ML
500000 SUSPENSION, ORAL (FINAL DOSE FORM) ORAL 4 TIMES DAILY
COMMUNITY
End: 2023-01-01

## 2022-01-01 RX ORDER — ALBUTEROL SULFATE 90 UG/1
1-2 AEROSOL, METERED RESPIRATORY (INHALATION)
Status: CANCELLED
Start: 2022-01-01

## 2022-01-01 RX ORDER — SODIUM CHLORIDE 9 MG/ML
INJECTION, SOLUTION INTRAVENOUS CONTINUOUS
Status: DISCONTINUED | OUTPATIENT
Start: 2022-01-01 | End: 2022-01-01 | Stop reason: HOSPADM

## 2022-01-01 RX ORDER — PROCHLORPERAZINE MALEATE 5 MG
5-10 TABLET ORAL EVERY 6 HOURS PRN
Status: CANCELLED
Start: 2022-01-01

## 2022-01-01 RX ORDER — AMOXICILLIN 250 MG
1 CAPSULE ORAL 2 TIMES DAILY PRN
Qty: 30 TABLET | Refills: 1 | Status: SHIPPED | OUTPATIENT
Start: 2022-01-01 | End: 2023-01-01

## 2022-01-01 RX ORDER — LIDOCAINE HYDROCHLORIDE 10 MG/ML
INJECTION, SOLUTION INFILTRATION; PERINEURAL PRN
Status: DISCONTINUED | OUTPATIENT
Start: 2022-01-01 | End: 2022-01-01

## 2022-01-01 RX ORDER — CODEINE PHOSPHATE AND GUAIFENESIN 10; 100 MG/5ML; MG/5ML
10 SOLUTION ORAL EVERY 4 HOURS PRN
Status: DISCONTINUED | OUTPATIENT
Start: 2022-01-01 | End: 2022-01-01 | Stop reason: HOSPADM

## 2022-01-01 RX ORDER — LORAZEPAM 2 MG/ML
0.5 INJECTION INTRAMUSCULAR EVERY 4 HOURS PRN
Status: CANCELLED | OUTPATIENT
Start: 2022-01-01

## 2022-01-01 RX ORDER — POTASSIUM CHLORIDE 1500 MG/1
20 TABLET, EXTENDED RELEASE ORAL DAILY
Qty: 90 TABLET | Refills: 3 | Status: ON HOLD | OUTPATIENT
Start: 2022-01-01 | End: 2022-01-01

## 2022-01-01 RX ORDER — ONDANSETRON 4 MG/1
4 TABLET, ORALLY DISINTEGRATING ORAL EVERY 30 MIN PRN
Status: DISCONTINUED | OUTPATIENT
Start: 2022-01-01 | End: 2022-01-01 | Stop reason: HOSPADM

## 2022-01-01 RX ORDER — CELECOXIB 200 MG/1
200 CAPSULE ORAL 2 TIMES DAILY
Start: 2022-01-01 | End: 2022-01-01

## 2022-01-01 RX ORDER — LEVOTHYROXINE SODIUM ANHYDROUS 100 UG/5ML
75 INJECTION, POWDER, LYOPHILIZED, FOR SOLUTION INTRAVENOUS DAILY
Status: DISCONTINUED | OUTPATIENT
Start: 2022-01-01 | End: 2022-01-01

## 2022-01-01 RX ORDER — ONDANSETRON 8 MG/1
16 TABLET, FILM COATED ORAL ONCE
Status: CANCELLED | OUTPATIENT
Start: 2022-01-01

## 2022-01-01 RX ORDER — SODIUM CHLORIDE 9 MG/ML
INJECTION, SOLUTION INTRAVENOUS CONTINUOUS
Status: DISCONTINUED | OUTPATIENT
Start: 2022-01-01 | End: 2022-01-01

## 2022-01-01 RX ORDER — AMOXICILLIN 250 MG
2 CAPSULE ORAL 2 TIMES DAILY PRN
Status: DISCONTINUED | OUTPATIENT
Start: 2022-01-01 | End: 2022-01-01 | Stop reason: HOSPADM

## 2022-01-01 RX ORDER — DOXEPIN HYDROCHLORIDE 10 MG/ML
25 SOLUTION ORAL 2 TIMES DAILY PRN
Qty: 118 ML | Refills: 0 | Status: SHIPPED | OUTPATIENT
Start: 2022-01-01

## 2022-01-01 RX ORDER — LORAZEPAM 0.5 MG/1
0.5 TABLET ORAL EVERY 4 HOURS PRN
Status: DISCONTINUED | OUTPATIENT
Start: 2022-01-01 | End: 2022-01-01 | Stop reason: HOSPADM

## 2022-01-01 RX ORDER — MAGNESIUM SULFATE HEPTAHYDRATE 40 MG/ML
2 INJECTION, SOLUTION INTRAVENOUS ONCE
Status: COMPLETED | OUTPATIENT
Start: 2022-01-01 | End: 2022-01-01

## 2022-01-01 RX ORDER — NALOXONE HYDROCHLORIDE 0.4 MG/ML
0.2 INJECTION, SOLUTION INTRAMUSCULAR; INTRAVENOUS; SUBCUTANEOUS
Status: CANCELLED | OUTPATIENT
Start: 2022-01-01

## 2022-01-01 RX ORDER — POTASSIUM CHLORIDE 1500 MG/1
20 TABLET, EXTENDED RELEASE ORAL ONCE
Status: COMPLETED | OUTPATIENT
Start: 2022-01-01 | End: 2022-01-01

## 2022-01-01 RX ORDER — ACETAMINOPHEN 500 MG
1000 TABLET ORAL ONCE
Status: COMPLETED | OUTPATIENT
Start: 2022-01-01 | End: 2022-01-01

## 2022-01-01 RX ORDER — NYSTATIN 100000/ML
500000 SUSPENSION, ORAL (FINAL DOSE FORM) ORAL 4 TIMES DAILY
Qty: 140 ML | Refills: 0 | Status: SHIPPED | OUTPATIENT
Start: 2022-01-01 | End: 2022-01-01

## 2022-01-01 RX ORDER — CLINDAMYCIN PHOSPHATE 900 MG/50ML
900 INJECTION, SOLUTION INTRAVENOUS
Status: DISCONTINUED | OUTPATIENT
Start: 2022-01-01 | End: 2022-01-01 | Stop reason: HOSPADM

## 2022-01-01 RX ORDER — OXYCODONE HYDROCHLORIDE 5 MG/1
10 TABLET ORAL EVERY 4 HOURS PRN
Status: DISCONTINUED | OUTPATIENT
Start: 2022-01-01 | End: 2022-01-01

## 2022-01-01 RX ORDER — FLUMAZENIL 0.1 MG/ML
0.2 INJECTION, SOLUTION INTRAVENOUS
Status: ACTIVE | OUTPATIENT
Start: 2022-01-01 | End: 2022-01-01

## 2022-01-01 RX ORDER — HYDROCORTISONE 20 MG/1
20 TABLET ORAL DAILY
Status: DISCONTINUED | OUTPATIENT
Start: 2022-01-01 | End: 2022-01-01 | Stop reason: HOSPADM

## 2022-01-01 RX ORDER — HYDROMORPHONE HYDROCHLORIDE 1 MG/ML
0.5 INJECTION, SOLUTION INTRAMUSCULAR; INTRAVENOUS; SUBCUTANEOUS
Status: DISCONTINUED | OUTPATIENT
Start: 2022-01-01 | End: 2022-01-01 | Stop reason: HOSPADM

## 2022-01-01 RX ORDER — INDOMETHACIN 50 MG/1
100 SUPPOSITORY RECTAL ONCE
Status: COMPLETED | OUTPATIENT
Start: 2022-01-01 | End: 2022-01-01

## 2022-01-01 RX ORDER — HEPARIN SODIUM,PORCINE 10 UNIT/ML
5 VIAL (ML) INTRAVENOUS
Status: DISCONTINUED | OUTPATIENT
Start: 2022-01-01 | End: 2022-01-01 | Stop reason: HOSPADM

## 2022-01-01 RX ORDER — HYDROMORPHONE HYDROCHLORIDE 1 MG/ML
0.2 INJECTION, SOLUTION INTRAMUSCULAR; INTRAVENOUS; SUBCUTANEOUS EVERY 5 MIN PRN
Status: DISCONTINUED | OUTPATIENT
Start: 2022-01-01 | End: 2022-01-01 | Stop reason: HOSPADM

## 2022-01-01 RX ORDER — CLOTRIMAZOLE 10 MG/1
10 LOZENGE ORAL
Qty: 90 LOZENGE | Refills: 1 | Status: ON HOLD | OUTPATIENT
Start: 2022-01-01 | End: 2022-01-01

## 2022-01-01 RX ORDER — ONDANSETRON 4 MG/1
16 TABLET, ORALLY DISINTEGRATING ORAL ONCE
Status: COMPLETED | OUTPATIENT
Start: 2022-01-01 | End: 2022-01-01

## 2022-01-01 RX ORDER — NYSTATIN 100000/ML
500000 SUSPENSION, ORAL (FINAL DOSE FORM) ORAL 4 TIMES DAILY
Status: DISCONTINUED | OUTPATIENT
Start: 2022-01-01 | End: 2022-01-01 | Stop reason: HOSPADM

## 2022-01-01 RX ORDER — IOPAMIDOL 755 MG/ML
61 INJECTION, SOLUTION INTRAVASCULAR ONCE
Status: COMPLETED | OUTPATIENT
Start: 2022-01-01 | End: 2022-01-01

## 2022-01-01 RX ORDER — CELECOXIB 200 MG/1
200 CAPSULE ORAL 2 TIMES DAILY
Status: DISCONTINUED | OUTPATIENT
Start: 2022-01-01 | End: 2022-01-01 | Stop reason: HOSPADM

## 2022-01-01 RX ORDER — DEXAMETHASONE SODIUM PHOSPHATE 10 MG/ML
20 INJECTION, SOLUTION INTRAMUSCULAR; INTRAVENOUS ONCE
Status: CANCELLED | OUTPATIENT
Start: 2022-01-01 | End: 2022-01-01

## 2022-01-01 RX ORDER — AMOXICILLIN 250 MG
1 CAPSULE ORAL 2 TIMES DAILY
Status: DISCONTINUED | OUTPATIENT
Start: 2022-01-01 | End: 2022-01-01 | Stop reason: HOSPADM

## 2022-01-01 RX ORDER — DIPHENHYDRAMINE HYDROCHLORIDE 50 MG/ML
50 INJECTION INTRAMUSCULAR; INTRAVENOUS
Status: CANCELLED | OUTPATIENT
Start: 2022-01-01

## 2022-01-01 RX ORDER — ONDANSETRON 2 MG/ML
INJECTION INTRAMUSCULAR; INTRAVENOUS PRN
Status: DISCONTINUED | OUTPATIENT
Start: 2022-01-01 | End: 2022-01-01

## 2022-01-01 RX ORDER — ACETAMINOPHEN 500 MG
1000 TABLET ORAL EVERY 8 HOURS PRN
Status: ON HOLD | COMMUNITY
End: 2022-01-01

## 2022-01-01 RX ORDER — HEPARIN SODIUM (PORCINE) LOCK FLUSH IV SOLN 100 UNIT/ML 100 UNIT/ML
5 SOLUTION INTRAVENOUS ONCE
Status: COMPLETED | OUTPATIENT
Start: 2022-01-01 | End: 2022-01-01

## 2022-01-01 RX ORDER — LORAZEPAM 2 MG/ML
.5-1 INJECTION INTRAMUSCULAR EVERY 6 HOURS PRN
Status: CANCELLED | OUTPATIENT
Start: 2022-01-01

## 2022-01-01 RX ORDER — CLINDAMYCIN PHOSPHATE 900 MG/50ML
900 INJECTION, SOLUTION INTRAVENOUS SEE ADMIN INSTRUCTIONS
Status: DISCONTINUED | OUTPATIENT
Start: 2022-01-01 | End: 2022-01-01 | Stop reason: HOSPADM

## 2022-01-01 RX ORDER — MEPERIDINE HYDROCHLORIDE 25 MG/ML
12.5 INJECTION INTRAMUSCULAR; INTRAVENOUS; SUBCUTANEOUS
Status: DISCONTINUED | OUTPATIENT
Start: 2022-01-01 | End: 2022-01-01 | Stop reason: HOSPADM

## 2022-01-01 RX ORDER — GADOBUTROL 604.72 MG/ML
7 INJECTION INTRAVENOUS ONCE
Status: COMPLETED | OUTPATIENT
Start: 2022-01-01 | End: 2022-01-01

## 2022-01-01 RX ORDER — DIPHENHYDRAMINE HYDROCHLORIDE 50 MG/ML
50 INJECTION INTRAMUSCULAR; INTRAVENOUS
Status: DISCONTINUED | OUTPATIENT
Start: 2022-01-01 | End: 2022-01-01 | Stop reason: HOSPADM

## 2022-01-01 RX ORDER — ONDANSETRON 2 MG/ML
4 INJECTION INTRAMUSCULAR; INTRAVENOUS EVERY 6 HOURS PRN
Status: DISCONTINUED | OUTPATIENT
Start: 2022-01-01 | End: 2022-01-01 | Stop reason: HOSPADM

## 2022-01-01 RX ORDER — NYSTATIN 100000/ML
500000 SUSPENSION, ORAL (FINAL DOSE FORM) ORAL 4 TIMES DAILY
Qty: 200 ML | Refills: 0 | Status: SHIPPED | OUTPATIENT
Start: 2022-01-01 | End: 2022-01-01

## 2022-01-01 RX ORDER — HYDROCORTISONE 20 MG/1
20 TABLET ORAL EVERY MORNING
Status: DISCONTINUED | OUTPATIENT
Start: 2022-01-01 | End: 2022-01-01 | Stop reason: HOSPADM

## 2022-01-01 RX ORDER — METHYLPREDNISOLONE SODIUM SUCCINATE 125 MG/2ML
125 INJECTION, POWDER, LYOPHILIZED, FOR SOLUTION INTRAMUSCULAR; INTRAVENOUS
Status: DISCONTINUED | OUTPATIENT
Start: 2022-01-01 | End: 2022-01-01 | Stop reason: HOSPADM

## 2022-01-01 RX ORDER — METRONIDAZOLE 10 MG/G
GEL TOPICAL DAILY
Qty: 30 G | Refills: 0 | Status: ON HOLD | OUTPATIENT
Start: 2022-01-01 | End: 2022-01-01

## 2022-01-01 RX ORDER — HYDROMORPHONE HCL IN WATER/PF 6 MG/30 ML
0.2 PATIENT CONTROLLED ANALGESIA SYRINGE INTRAVENOUS
Status: DISCONTINUED | OUTPATIENT
Start: 2022-01-01 | End: 2022-01-01 | Stop reason: HOSPADM

## 2022-01-01 RX ORDER — LORAZEPAM 0.5 MG/1
.5-1 TABLET ORAL EVERY 6 HOURS PRN
Status: CANCELLED
Start: 2022-01-01

## 2022-01-01 RX ORDER — DOXEPIN HYDROCHLORIDE 10 MG/ML
25 SOLUTION ORAL 2 TIMES DAILY PRN
Status: DISCONTINUED | OUTPATIENT
Start: 2022-01-01 | End: 2022-01-01 | Stop reason: HOSPADM

## 2022-01-01 RX ORDER — ONDANSETRON 4 MG/1
4-8 TABLET, FILM COATED ORAL EVERY 8 HOURS PRN
Qty: 60 TABLET | Refills: 3 | Status: SHIPPED | OUTPATIENT
Start: 2022-01-01

## 2022-01-01 RX ORDER — HYDROMORPHONE HYDROCHLORIDE 1 MG/ML
.5-1 INJECTION, SOLUTION INTRAMUSCULAR; INTRAVENOUS; SUBCUTANEOUS
Status: DISCONTINUED | OUTPATIENT
Start: 2022-01-01 | End: 2022-01-01

## 2022-01-01 RX ORDER — PROPOFOL 10 MG/ML
INJECTION, EMULSION INTRAVENOUS PRN
Status: DISCONTINUED | OUTPATIENT
Start: 2022-01-01 | End: 2022-01-01

## 2022-01-01 RX ORDER — MEROPENEM 500 MG/1
500 INJECTION, POWDER, FOR SOLUTION INTRAVENOUS EVERY 8 HOURS
Status: DISCONTINUED | OUTPATIENT
Start: 2022-01-01 | End: 2022-01-01

## 2022-01-01 RX ORDER — SODIUM CHLORIDE, SODIUM LACTATE, POTASSIUM CHLORIDE, CALCIUM CHLORIDE 600; 310; 30; 20 MG/100ML; MG/100ML; MG/100ML; MG/100ML
INJECTION, SOLUTION INTRAVENOUS CONTINUOUS
Status: DISCONTINUED | OUTPATIENT
Start: 2022-01-01 | End: 2022-01-01

## 2022-01-01 RX ORDER — FLUCONAZOLE 200 MG/1
TABLET ORAL
COMMUNITY
Start: 2022-01-01 | End: 2022-01-01

## 2022-01-01 RX ORDER — POTASSIUM CHLORIDE 1500 MG/1
20 TABLET, EXTENDED RELEASE ORAL DAILY
Qty: 90 TABLET | Refills: 3
Start: 2022-01-01 | End: 2023-01-01

## 2022-01-01 RX ORDER — METHOCARBAMOL 500 MG/1
500 TABLET, FILM COATED ORAL EVERY 6 HOURS PRN
Status: DISCONTINUED | OUTPATIENT
Start: 2022-01-01 | End: 2022-01-01

## 2022-01-01 RX ORDER — METRONIDAZOLE 10 MG/G
GEL TOPICAL DAILY PRN
Status: DISCONTINUED | OUTPATIENT
Start: 2022-01-01 | End: 2022-01-01 | Stop reason: HOSPADM

## 2022-01-01 RX ORDER — HYDROCORTISONE 5 MG/1
10 TABLET ORAL
Status: DISCONTINUED | OUTPATIENT
Start: 2022-01-01 | End: 2022-01-01 | Stop reason: HOSPADM

## 2022-01-01 RX ORDER — CODEINE PHOSPHATE/GUAIFENESIN 10-100MG/5
10 LIQUID (ML) ORAL EVERY 4 HOURS PRN
Qty: 118 ML | Refills: 0 | Status: SHIPPED | OUTPATIENT
Start: 2022-01-01 | End: 2023-01-01

## 2022-01-01 RX ORDER — PROCHLORPERAZINE 25 MG
12.5 SUPPOSITORY, RECTAL RECTAL EVERY 12 HOURS PRN
Status: DISCONTINUED | OUTPATIENT
Start: 2022-01-01 | End: 2022-01-01 | Stop reason: HOSPADM

## 2022-01-01 RX ORDER — AMLODIPINE BESYLATE 5 MG/1
5 TABLET ORAL DAILY
Qty: 30 TABLET | Refills: 3 | Status: SHIPPED | OUTPATIENT
Start: 2022-01-01 | End: 2023-01-01

## 2022-01-01 RX ORDER — TRIAMCINOLONE ACETONIDE 1 MG/G
CREAM TOPICAL 2 TIMES DAILY PRN
Start: 2022-01-01 | End: 2023-01-01

## 2022-01-01 RX ORDER — AMOXICILLIN 250 MG
1 CAPSULE ORAL 2 TIMES DAILY PRN
Status: DISCONTINUED | OUTPATIENT
Start: 2022-01-01 | End: 2022-01-01 | Stop reason: HOSPADM

## 2022-01-01 RX ORDER — ACETAMINOPHEN 325 MG/1
650 TABLET ORAL EVERY 6 HOURS PRN
Status: DISCONTINUED | OUTPATIENT
Start: 2022-01-01 | End: 2022-01-01 | Stop reason: HOSPADM

## 2022-01-01 RX ORDER — POTASSIUM CHLORIDE 7.45 MG/ML
10 INJECTION INTRAVENOUS
Status: COMPLETED | OUTPATIENT
Start: 2022-01-01 | End: 2022-01-01

## 2022-01-01 RX ORDER — HYDROCORTISONE 10 MG/1
10 TABLET ORAL
Status: DISCONTINUED | OUTPATIENT
Start: 2022-01-01 | End: 2022-01-01 | Stop reason: HOSPADM

## 2022-01-01 RX ORDER — MEROPENEM 1 G/1
1 INJECTION, POWDER, FOR SOLUTION INTRAVENOUS EVERY 8 HOURS
Status: DISCONTINUED | OUTPATIENT
Start: 2022-01-01 | End: 2022-01-01

## 2022-01-01 RX ORDER — ACETAMINOPHEN 650 MG/1
650 SUPPOSITORY RECTAL EVERY 6 HOURS PRN
Status: DISCONTINUED | OUTPATIENT
Start: 2022-01-01 | End: 2022-01-01 | Stop reason: HOSPADM

## 2022-01-01 RX ORDER — IOPAMIDOL 755 MG/ML
68 INJECTION, SOLUTION INTRAVASCULAR ONCE
Status: COMPLETED | OUTPATIENT
Start: 2022-01-01 | End: 2022-01-01

## 2022-01-01 RX ORDER — IOPAMIDOL 755 MG/ML
58 INJECTION, SOLUTION INTRAVASCULAR ONCE
Status: COMPLETED | OUTPATIENT
Start: 2022-01-01 | End: 2022-01-01

## 2022-01-01 RX ORDER — ESCITALOPRAM OXALATE 10 MG/1
10 TABLET ORAL EVERY EVENING
Status: DISCONTINUED | OUTPATIENT
Start: 2022-01-01 | End: 2022-01-01 | Stop reason: HOSPADM

## 2022-01-01 RX ORDER — HYDROMORPHONE HCL IN WATER/PF 6 MG/30 ML
.2-.4 PATIENT CONTROLLED ANALGESIA SYRINGE INTRAVENOUS
Status: DISCONTINUED | OUTPATIENT
Start: 2022-01-01 | End: 2022-01-01

## 2022-01-01 RX ORDER — FLUCONAZOLE 200 MG/1
200 TABLET ORAL DAILY
Qty: 7 TABLET | Refills: 1 | Status: SHIPPED | OUTPATIENT
Start: 2022-01-01 | End: 2022-01-01

## 2022-01-01 RX ORDER — ONDANSETRON 2 MG/ML
INJECTION INTRAMUSCULAR; INTRAVENOUS
Status: COMPLETED
Start: 2022-01-01 | End: 2022-01-01

## 2022-01-01 RX ORDER — PAZOPANIB 200 MG/1
800 TABLET, FILM COATED ORAL DAILY
Qty: 56 TABLET | Refills: 0 | Status: SHIPPED | OUTPATIENT
Start: 2022-01-01 | End: 2023-01-01

## 2022-01-01 RX ORDER — HYDRALAZINE HYDROCHLORIDE 20 MG/ML
10 INJECTION INTRAMUSCULAR; INTRAVENOUS EVERY 4 HOURS PRN
Status: DISCONTINUED | OUTPATIENT
Start: 2022-01-01 | End: 2022-01-01 | Stop reason: HOSPADM

## 2022-01-01 RX ORDER — HEPARIN SODIUM (PORCINE) LOCK FLUSH IV SOLN 100 UNIT/ML 100 UNIT/ML
5-10 SOLUTION INTRAVENOUS
Status: DISCONTINUED | OUTPATIENT
Start: 2022-01-01 | End: 2022-01-01 | Stop reason: HOSPADM

## 2022-01-01 RX ORDER — ALBUTEROL SULFATE 0.83 MG/ML
2.5 SOLUTION RESPIRATORY (INHALATION)
Status: DISCONTINUED | OUTPATIENT
Start: 2022-01-01 | End: 2022-01-01 | Stop reason: HOSPADM

## 2022-01-01 RX ORDER — METHOCARBAMOL 500 MG/1
500 TABLET, FILM COATED ORAL EVERY 6 HOURS
Status: DISCONTINUED | OUTPATIENT
Start: 2022-01-01 | End: 2022-01-01 | Stop reason: HOSPADM

## 2022-01-01 RX ORDER — METHOCARBAMOL 500 MG/1
500 TABLET, FILM COATED ORAL EVERY 6 HOURS
Qty: 20 TABLET | Refills: 0 | Status: SHIPPED | OUTPATIENT
Start: 2022-01-01 | End: 2023-01-01

## 2022-01-01 RX ORDER — FENTANYL CITRATE 50 UG/ML
INJECTION, SOLUTION INTRAMUSCULAR; INTRAVENOUS PRN
Status: DISCONTINUED | OUTPATIENT
Start: 2022-01-01 | End: 2022-01-01

## 2022-01-01 RX ORDER — MEPERIDINE HYDROCHLORIDE 25 MG/ML
25 INJECTION INTRAMUSCULAR; INTRAVENOUS; SUBCUTANEOUS EVERY 30 MIN PRN
Status: DISCONTINUED | OUTPATIENT
Start: 2022-01-01 | End: 2022-01-01 | Stop reason: HOSPADM

## 2022-01-01 RX ORDER — LIDOCAINE 50 MG/G
OINTMENT TOPICAL 4 TIMES DAILY
Status: DISCONTINUED | OUTPATIENT
Start: 2022-01-01 | End: 2022-01-01 | Stop reason: HOSPADM

## 2022-01-01 RX ORDER — HYDROCODONE BITARTRATE AND ACETAMINOPHEN 5; 325 MG/1; MG/1
1 TABLET ORAL EVERY 6 HOURS PRN
Status: DISCONTINUED | OUTPATIENT
Start: 2022-01-01 | End: 2022-01-01 | Stop reason: HOSPADM

## 2022-01-01 RX ORDER — HYDROMORPHONE HYDROCHLORIDE 1 MG/ML
0.4 INJECTION, SOLUTION INTRAMUSCULAR; INTRAVENOUS; SUBCUTANEOUS EVERY 5 MIN PRN
Status: DISCONTINUED | OUTPATIENT
Start: 2022-01-01 | End: 2022-01-01 | Stop reason: HOSPADM

## 2022-01-01 RX ORDER — LORAZEPAM 2 MG/ML
.5-1 INJECTION INTRAMUSCULAR
Status: DISCONTINUED | OUTPATIENT
Start: 2022-01-01 | End: 2022-01-01 | Stop reason: HOSPADM

## 2022-01-01 RX ORDER — POTASSIUM CHLORIDE 1.5 G/1.58G
20 POWDER, FOR SOLUTION ORAL ONCE
Status: COMPLETED | OUTPATIENT
Start: 2022-01-01 | End: 2022-01-01

## 2022-01-01 RX ORDER — OXYCODONE HYDROCHLORIDE 5 MG/1
5 TABLET ORAL EVERY 4 HOURS PRN
Qty: 10 TABLET | Refills: 0 | Status: SHIPPED | OUTPATIENT
Start: 2022-01-01 | End: 2023-01-01

## 2022-01-01 RX ORDER — FENTANYL CITRATE 50 UG/ML
INJECTION, SOLUTION INTRAMUSCULAR; INTRAVENOUS PRN
Status: DISCONTINUED | OUTPATIENT
Start: 2022-01-01 | End: 2022-01-01 | Stop reason: HOSPADM

## 2022-01-01 RX ORDER — AMLODIPINE BESYLATE 5 MG/1
5 TABLET ORAL DAILY
Status: DISCONTINUED | OUTPATIENT
Start: 2022-01-01 | End: 2022-01-01 | Stop reason: HOSPADM

## 2022-01-01 RX ORDER — AMOXICILLIN 250 MG
1 CAPSULE ORAL
Status: DISCONTINUED | OUTPATIENT
Start: 2022-01-01 | End: 2022-01-01

## 2022-01-01 RX ORDER — CELECOXIB 100 MG/1
200 CAPSULE ORAL 2 TIMES DAILY
Status: DISCONTINUED | OUTPATIENT
Start: 2022-01-01 | End: 2022-01-01 | Stop reason: HOSPADM

## 2022-01-01 RX ORDER — DOXEPIN HYDROCHLORIDE 10 MG/ML
25 SOLUTION ORAL 2 TIMES DAILY PRN
Qty: 118 ML | Refills: 0 | Status: SHIPPED | OUTPATIENT
Start: 2022-01-01 | End: 2022-01-01

## 2022-01-01 RX ORDER — CELECOXIB 200 MG/1
200 CAPSULE ORAL 2 TIMES DAILY
Status: ON HOLD | COMMUNITY
Start: 2022-01-01 | End: 2022-01-01

## 2022-01-01 RX ORDER — HYDROCORTISONE 20 MG/1
40 TABLET ORAL DAILY
Status: DISCONTINUED | OUTPATIENT
Start: 2022-01-01 | End: 2022-01-01 | Stop reason: HOSPADM

## 2022-01-01 RX ADMIN — DEXTROSE MONOHYDRATE 250 ML: 50 INJECTION, SOLUTION INTRAVENOUS at 11:32

## 2022-01-01 RX ADMIN — ESCITALOPRAM OXALATE 10 MG: 10 TABLET ORAL at 09:46

## 2022-01-01 RX ADMIN — SODIUM CHLORIDE 1000 ML: 9 INJECTION, SOLUTION INTRAVENOUS at 14:02

## 2022-01-01 RX ADMIN — Medication 5 ML: at 12:00

## 2022-01-01 RX ADMIN — Medication 5 ML: at 11:22

## 2022-01-01 RX ADMIN — LEVOTHYROXINE SODIUM 75 MCG: 0.03 TABLET ORAL at 10:12

## 2022-01-01 RX ADMIN — ACETAMINOPHEN 650 MG: 325 TABLET ORAL at 08:56

## 2022-01-01 RX ADMIN — SODIUM CHLORIDE: 9 INJECTION, SOLUTION INTRAVENOUS at 21:33

## 2022-01-01 RX ADMIN — IOPAMIDOL 74 ML: 755 INJECTION, SOLUTION INTRAVENOUS at 09:21

## 2022-01-01 RX ADMIN — SODIUM CHLORIDE 1000 ML: 9 INJECTION, SOLUTION INTRAVENOUS at 10:13

## 2022-01-01 RX ADMIN — SODIUM CHLORIDE 1000 ML: 9 INJECTION, SOLUTION INTRAVENOUS at 10:07

## 2022-01-01 RX ADMIN — Medication 5 ML: at 14:34

## 2022-01-01 RX ADMIN — PROCHLORPERAZINE MALEATE 5 MG: 5 TABLET ORAL at 10:37

## 2022-01-01 RX ADMIN — GEMCITABINE 1200 MG: 38 INJECTION, SOLUTION INTRAVENOUS at 11:45

## 2022-01-01 RX ADMIN — SODIUM CHLORIDE 1000 ML: 9 INJECTION, SOLUTION INTRAVENOUS at 11:12

## 2022-01-01 RX ADMIN — TRABECTEDIN 2.35 MG: 0.05 INJECTION, POWDER, LYOPHILIZED, FOR SOLUTION INTRAVENOUS at 14:27

## 2022-01-01 RX ADMIN — HEPARIN SODIUM (PORCINE) LOCK FLUSH IV SOLN 100 UNIT/ML 5 ML: 100 SOLUTION at 09:54

## 2022-01-01 RX ADMIN — MAGNESIUM SULFATE HEPTAHYDRATE 2 G: 40 INJECTION, SOLUTION INTRAVENOUS at 11:48

## 2022-01-01 RX ADMIN — ONDANSETRON 4 MG: 4 TABLET, ORALLY DISINTEGRATING ORAL at 08:29

## 2022-01-01 RX ADMIN — HYDRALAZINE HYDROCHLORIDE 10 MG: 20 INJECTION INTRAMUSCULAR; INTRAVENOUS at 07:35

## 2022-01-01 RX ADMIN — ONDANSETRON 8 MG: 2 INJECTION INTRAMUSCULAR; INTRAVENOUS at 09:41

## 2022-01-01 RX ADMIN — GEMCITABINE 1200 MG: 38 INJECTION, SOLUTION INTRAVENOUS at 14:32

## 2022-01-01 RX ADMIN — IOPAMIDOL 68 ML: 755 INJECTION, SOLUTION INTRAVENOUS at 14:00

## 2022-01-01 RX ADMIN — SENNOSIDES AND DOCUSATE SODIUM 1 TABLET: 50; 8.6 TABLET ORAL at 13:31

## 2022-01-01 RX ADMIN — CELECOXIB 200 MG: 200 CAPSULE ORAL at 09:27

## 2022-01-01 RX ADMIN — SODIUM CHLORIDE 1000 ML: 9 INJECTION, SOLUTION INTRAVENOUS at 10:17

## 2022-01-01 RX ADMIN — MEROPENEM 1 G: 1 INJECTION, POWDER, FOR SOLUTION INTRAVENOUS at 00:06

## 2022-01-01 RX ADMIN — ACETAMINOPHEN 650 MG: 325 TABLET ORAL at 14:02

## 2022-01-01 RX ADMIN — SODIUM CHLORIDE, POTASSIUM CHLORIDE, SODIUM LACTATE AND CALCIUM CHLORIDE 1000 ML: 600; 310; 30; 20 INJECTION, SOLUTION INTRAVENOUS at 16:19

## 2022-01-01 RX ADMIN — SODIUM CHLORIDE 1000 ML: 9 INJECTION, SOLUTION INTRAVENOUS at 10:04

## 2022-01-01 RX ADMIN — IOPAMIDOL 58 ML: 755 INJECTION, SOLUTION INTRAVENOUS at 10:15

## 2022-01-01 RX ADMIN — ONDANSETRON 8 MG: 2 INJECTION INTRAMUSCULAR; INTRAVENOUS at 11:27

## 2022-01-01 RX ADMIN — MEROPENEM 1 G: 1 INJECTION, POWDER, FOR SOLUTION INTRAVENOUS at 16:08

## 2022-01-01 RX ADMIN — ONDANSETRON HYDROCHLORIDE: 2 INJECTION, SOLUTION INTRAVENOUS at 11:04

## 2022-01-01 RX ADMIN — MEROPENEM 1 G: 1 INJECTION, POWDER, FOR SOLUTION INTRAVENOUS at 11:54

## 2022-01-01 RX ADMIN — SODIUM CHLORIDE 1000 ML: 9 INJECTION, SOLUTION INTRAVENOUS at 15:46

## 2022-01-01 RX ADMIN — ACETAMINOPHEN 650 MG: 325 TABLET ORAL at 06:00

## 2022-01-01 RX ADMIN — Medication 5 ML: at 16:00

## 2022-01-01 RX ADMIN — POTASSIUM CHLORIDE 10 MEQ: 7.46 INJECTION, SOLUTION INTRAVENOUS at 22:29

## 2022-01-01 RX ADMIN — Medication 5 ML: at 11:49

## 2022-01-01 RX ADMIN — POTASSIUM CHLORIDE 20 MEQ: 1.5 POWDER, FOR SOLUTION ORAL at 08:57

## 2022-01-01 RX ADMIN — Medication 5 ML: at 11:09

## 2022-01-01 RX ADMIN — HYDROCORTISONE 10 MG: 5 TABLET ORAL at 16:19

## 2022-01-01 RX ADMIN — SODIUM CHLORIDE 1000 ML: 9 INJECTION, SOLUTION INTRAVENOUS at 13:44

## 2022-01-01 RX ADMIN — HYDROMORPHONE HYDROCHLORIDE 1 MG: 1 INJECTION, SOLUTION INTRAMUSCULAR; INTRAVENOUS; SUBCUTANEOUS at 07:35

## 2022-01-01 RX ADMIN — ESCITALOPRAM OXALATE 10 MG: 10 TABLET ORAL at 20:49

## 2022-01-01 RX ADMIN — HEPARIN 5 ML: 100 SYRINGE at 12:48

## 2022-01-01 RX ADMIN — HYDROCORTISONE 20 MG: 20 TABLET ORAL at 20:42

## 2022-01-01 RX ADMIN — Medication 5 ML: at 16:02

## 2022-01-01 RX ADMIN — CELECOXIB 200 MG: 200 CAPSULE ORAL at 12:18

## 2022-01-01 RX ADMIN — FOSAPREPITANT: 150 INJECTION, POWDER, LYOPHILIZED, FOR SOLUTION INTRAVENOUS at 10:24

## 2022-01-01 RX ADMIN — POTASSIUM CHLORIDE 10 MEQ: 7.46 INJECTION, SOLUTION INTRAVENOUS at 01:55

## 2022-01-01 RX ADMIN — MEROPENEM 1 G: 1 INJECTION, POWDER, FOR SOLUTION INTRAVENOUS at 09:21

## 2022-01-01 RX ADMIN — DOXORUBICIN HYDROCHLORIDE 55 MG: 2 INJECTABLE, LIPOSOMAL INTRAVENOUS at 11:32

## 2022-01-01 RX ADMIN — PANTOPRAZOLE SODIUM 40 MG: 40 TABLET, DELAYED RELEASE ORAL at 07:25

## 2022-01-01 RX ADMIN — SODIUM CHLORIDE 50 ML: 9 INJECTION, SOLUTION INTRAVENOUS at 17:50

## 2022-01-01 RX ADMIN — HEPARIN 5 ML: 100 SYRINGE at 11:03

## 2022-01-01 RX ADMIN — ROCURONIUM BROMIDE 40 MG: 50 INJECTION, SOLUTION INTRAVENOUS at 17:27

## 2022-01-01 RX ADMIN — SODIUM CHLORIDE 56 ML: 9 INJECTION, SOLUTION INTRAVENOUS at 10:15

## 2022-01-01 RX ADMIN — LEVOTHYROXINE SODIUM 75 MCG: 0.03 TABLET ORAL at 06:50

## 2022-01-01 RX ADMIN — PROCHLORPERAZINE EDISYLATE 5 MG: 5 INJECTION INTRAMUSCULAR; INTRAVENOUS at 21:32

## 2022-01-01 RX ADMIN — HYDROCORTISONE 20 MG: 20 TABLET ORAL at 09:26

## 2022-01-01 RX ADMIN — MEROPENEM 500 MG: 500 INJECTION, POWDER, FOR SOLUTION INTRAVENOUS at 02:00

## 2022-01-01 RX ADMIN — MEROPENEM 1 G: 1 INJECTION, POWDER, FOR SOLUTION INTRAVENOUS at 00:54

## 2022-01-01 RX ADMIN — HEPARIN 5 ML: 100 SYRINGE at 12:58

## 2022-01-01 RX ADMIN — ESCITALOPRAM OXALATE 10 MG: 10 TABLET ORAL at 21:35

## 2022-01-01 RX ADMIN — LISINOPRIL 20 MG: 20 TABLET ORAL at 12:53

## 2022-01-01 RX ADMIN — POTASSIUM CHLORIDE 20 MEQ: 1500 TABLET, EXTENDED RELEASE ORAL at 22:49

## 2022-01-01 RX ADMIN — LISINOPRIL 20 MG: 20 TABLET ORAL at 09:06

## 2022-01-01 RX ADMIN — POTASSIUM CHLORIDE 10 MEQ: 7.46 INJECTION, SOLUTION INTRAVENOUS at 23:41

## 2022-01-01 RX ADMIN — SODIUM CHLORIDE: 9 INJECTION, SOLUTION INTRAVENOUS at 06:55

## 2022-01-01 RX ADMIN — SODIUM CHLORIDE 1000 ML: 9 INJECTION, SOLUTION INTRAVENOUS at 10:44

## 2022-01-01 RX ADMIN — OXYCODONE HYDROCHLORIDE 10 MG: 5 TABLET ORAL at 22:47

## 2022-01-01 RX ADMIN — Medication 5 ML: at 10:27

## 2022-01-01 RX ADMIN — CELECOXIB 200 MG: 200 CAPSULE ORAL at 20:11

## 2022-01-01 RX ADMIN — HYDROMORPHONE HYDROCHLORIDE 1 MG: 1 INJECTION, SOLUTION INTRAMUSCULAR; INTRAVENOUS; SUBCUTANEOUS at 10:19

## 2022-01-01 RX ADMIN — Medication 5 ML: at 12:42

## 2022-01-01 RX ADMIN — SODIUM CHLORIDE 1000 ML: 9 INJECTION, SOLUTION INTRAVENOUS at 08:19

## 2022-01-01 RX ADMIN — HYDROCORTISONE SODIUM SUCCINATE 10 MG: 100 INJECTION, POWDER, FOR SOLUTION INTRAMUSCULAR; INTRAVENOUS at 09:21

## 2022-01-01 RX ADMIN — MEROPENEM 1 G: 1 INJECTION, POWDER, FOR SOLUTION INTRAVENOUS at 00:31

## 2022-01-01 RX ADMIN — ONDANSETRON 4 MG: 2 INJECTION INTRAMUSCULAR; INTRAVENOUS at 13:38

## 2022-01-01 RX ADMIN — SODIUM CHLORIDE 1000 ML: 9 INJECTION, SOLUTION INTRAVENOUS at 09:58

## 2022-01-01 RX ADMIN — SODIUM CHLORIDE 1000 ML: 9 INJECTION, SOLUTION INTRAVENOUS at 13:58

## 2022-01-01 RX ADMIN — MEROPENEM 1 G: 1 INJECTION, POWDER, FOR SOLUTION INTRAVENOUS at 11:33

## 2022-01-01 RX ADMIN — SODIUM CHLORIDE 1000 ML: 9 INJECTION, SOLUTION INTRAVENOUS at 10:58

## 2022-01-01 RX ADMIN — SODIUM CHLORIDE, POTASSIUM CHLORIDE, SODIUM LACTATE AND CALCIUM CHLORIDE: 600; 310; 30; 20 INJECTION, SOLUTION INTRAVENOUS at 19:08

## 2022-01-01 RX ADMIN — ACETAMINOPHEN 650 MG: 325 TABLET ORAL at 20:42

## 2022-01-01 RX ADMIN — HYDROMORPHONE HYDROCHLORIDE 0.5 MG: 1 INJECTION, SOLUTION INTRAMUSCULAR; INTRAVENOUS; SUBCUTANEOUS at 17:51

## 2022-01-01 RX ADMIN — Medication 5 ML: at 17:22

## 2022-01-01 RX ADMIN — SODIUM CHLORIDE 1000 ML: 9 INJECTION, SOLUTION INTRAVENOUS at 10:02

## 2022-01-01 RX ADMIN — AMLODIPINE BESYLATE 5 MG: 5 TABLET ORAL at 09:06

## 2022-01-01 RX ADMIN — Medication 5 ML: at 15:01

## 2022-01-01 RX ADMIN — DEXAMETHASONE SODIUM PHOSPHATE 10 MG: 10 INJECTION, SOLUTION INTRAMUSCULAR; INTRAVENOUS at 17:27

## 2022-01-01 RX ADMIN — DOXEPIN HYDROCHLORIDE 25 MG: 10 SOLUTION ORAL at 00:41

## 2022-01-01 RX ADMIN — Medication 5 ML: at 15:11

## 2022-01-01 RX ADMIN — METHOCARBAMOL 500 MG: 500 TABLET, FILM COATED ORAL at 23:57

## 2022-01-01 RX ADMIN — ACETAMINOPHEN 1000 MG: 500 TABLET, FILM COATED ORAL at 19:09

## 2022-01-01 RX ADMIN — PROPOFOL 140 MG: 10 INJECTION, EMULSION INTRAVENOUS at 14:03

## 2022-01-01 RX ADMIN — HYDROMORPHONE HYDROCHLORIDE 0.5 MG: 1 INJECTION, SOLUTION INTRAMUSCULAR; INTRAVENOUS; SUBCUTANEOUS at 17:46

## 2022-01-01 RX ADMIN — Medication 5 ML: at 11:31

## 2022-01-01 RX ADMIN — AMLODIPINE BESYLATE 5 MG: 5 TABLET ORAL at 08:57

## 2022-01-01 RX ADMIN — MEROPENEM 1 G: 1 INJECTION, POWDER, FOR SOLUTION INTRAVENOUS at 00:16

## 2022-01-01 RX ADMIN — HYDROCORTISONE 40 MG: 20 TABLET ORAL at 09:07

## 2022-01-01 RX ADMIN — AMLODIPINE BESYLATE 5 MG: 5 TABLET ORAL at 10:19

## 2022-01-01 RX ADMIN — HYDROCORTISONE 40 MG: 20 TABLET ORAL at 08:56

## 2022-01-01 RX ADMIN — SODIUM CHLORIDE 1000 ML: 9 INJECTION, SOLUTION INTRAVENOUS at 10:10

## 2022-01-01 RX ADMIN — AMLODIPINE BESYLATE 5 MG: 5 TABLET ORAL at 08:23

## 2022-01-01 RX ADMIN — HYDROMORPHONE HYDROCHLORIDE 1 MG: 1 INJECTION, SOLUTION INTRAMUSCULAR; INTRAVENOUS; SUBCUTANEOUS at 21:46

## 2022-01-01 RX ADMIN — GEMCITABINE 1200 MG: 38 INJECTION, SOLUTION INTRAVENOUS at 10:09

## 2022-01-01 RX ADMIN — SODIUM CHLORIDE 57 ML: 9 INJECTION, SOLUTION INTRAVENOUS at 15:10

## 2022-01-01 RX ADMIN — SODIUM CHLORIDE 1000 ML: 9 INJECTION, SOLUTION INTRAVENOUS at 13:26

## 2022-01-01 RX ADMIN — Medication 5 ML: at 14:13

## 2022-01-01 RX ADMIN — SODIUM CHLORIDE: 9 INJECTION, SOLUTION INTRAVENOUS at 09:20

## 2022-01-01 RX ADMIN — Medication 5 ML: at 09:20

## 2022-01-01 RX ADMIN — HYDROMORPHONE HYDROCHLORIDE 0.5 MG: 1 INJECTION, SOLUTION INTRAMUSCULAR; INTRAVENOUS; SUBCUTANEOUS at 17:04

## 2022-01-01 RX ADMIN — MEROPENEM 1 G: 1 INJECTION, POWDER, FOR SOLUTION INTRAVENOUS at 16:49

## 2022-01-01 RX ADMIN — SODIUM CHLORIDE: 9 INJECTION, SOLUTION INTRAVENOUS at 05:59

## 2022-01-01 RX ADMIN — INDOMETHACIN 100 MG: 50 SUPPOSITORY RECTAL at 15:11

## 2022-01-01 RX ADMIN — ENOXAPARIN SODIUM 40 MG: 40 INJECTION SUBCUTANEOUS at 11:40

## 2022-01-01 RX ADMIN — POTASSIUM CHLORIDE 20 MEQ: 1500 TABLET, EXTENDED RELEASE ORAL at 09:06

## 2022-01-01 RX ADMIN — HYDROCORTISONE 20 MG: 20 TABLET ORAL at 09:46

## 2022-01-01 RX ADMIN — HEPARIN 5 ML: 100 SYRINGE at 15:13

## 2022-01-01 RX ADMIN — LIDOCAINE: 50 OINTMENT TOPICAL at 13:33

## 2022-01-01 RX ADMIN — SODIUM CHLORIDE 58 ML: 9 INJECTION, SOLUTION INTRAVENOUS at 09:21

## 2022-01-01 RX ADMIN — MEROPENEM 1 G: 1 INJECTION, POWDER, FOR SOLUTION INTRAVENOUS at 16:25

## 2022-01-01 RX ADMIN — HYDROCORTISONE 10 MG: 10 TABLET ORAL at 20:48

## 2022-01-01 RX ADMIN — Medication 5 ML: at 12:39

## 2022-01-01 RX ADMIN — DEXAMETHASONE SODIUM PHOSPHATE: 10 INJECTION, SOLUTION INTRAMUSCULAR; INTRAVENOUS at 10:15

## 2022-01-01 RX ADMIN — OXYCODONE HYDROCHLORIDE 10 MG: 5 TABLET ORAL at 04:03

## 2022-01-01 RX ADMIN — NYSTATIN 500000 UNITS: 100000 SUSPENSION ORAL at 09:46

## 2022-01-01 RX ADMIN — OXYCODONE HYDROCHLORIDE 2.5 MG: 5 TABLET ORAL at 05:36

## 2022-01-01 RX ADMIN — Medication 500 UNITS: at 09:32

## 2022-01-01 RX ADMIN — Medication 400 MG: at 09:07

## 2022-01-01 RX ADMIN — AMLODIPINE BESYLATE 5 MG: 5 TABLET ORAL at 09:26

## 2022-01-01 RX ADMIN — POTASSIUM CHLORIDE 40 MEQ: 1500 TABLET, EXTENDED RELEASE ORAL at 09:21

## 2022-01-01 RX ADMIN — Medication 500 UNITS: at 13:24

## 2022-01-01 RX ADMIN — DEXTROSE MONOHYDRATE 250 ML: 50 INJECTION, SOLUTION INTRAVENOUS at 10:50

## 2022-01-01 RX ADMIN — OXYCODONE HYDROCHLORIDE 10 MG: 5 TABLET ORAL at 20:47

## 2022-01-01 RX ADMIN — GADOBUTROL 7 ML: 604.72 INJECTION INTRAVENOUS at 17:50

## 2022-01-01 RX ADMIN — FENTANYL CITRATE 50 MCG: 50 INJECTION, SOLUTION INTRAMUSCULAR; INTRAVENOUS at 14:03

## 2022-01-01 RX ADMIN — PROPOFOL 150 MG: 10 INJECTION, EMULSION INTRAVENOUS at 17:27

## 2022-01-01 RX ADMIN — Medication 400 MG: at 14:02

## 2022-01-01 RX ADMIN — HYDROCORTISONE SODIUM SUCCINATE 10 MG: 100 INJECTION, POWDER, FOR SOLUTION INTRAMUSCULAR; INTRAVENOUS at 10:13

## 2022-01-01 RX ADMIN — SODIUM CHLORIDE 1000 ML: 9 INJECTION, SOLUTION INTRAVENOUS at 09:15

## 2022-01-01 RX ADMIN — DOXORUBICIN HYDROCHLORIDE 55 MG: 2 INJECTABLE, LIPOSOMAL INTRAVENOUS at 10:49

## 2022-01-01 RX ADMIN — ACETAMINOPHEN 650 MG: 325 TABLET ORAL at 09:06

## 2022-01-01 RX ADMIN — Medication 5 ML: at 15:06

## 2022-01-01 RX ADMIN — OXYCODONE HYDROCHLORIDE 10 MG: 5 TABLET ORAL at 16:50

## 2022-01-01 RX ADMIN — SODIUM CHLORIDE 58 ML: 9 INJECTION, SOLUTION INTRAVENOUS at 09:46

## 2022-01-01 RX ADMIN — LEVOTHYROXINE SODIUM 75 MCG: 0.03 TABLET ORAL at 05:18

## 2022-01-01 RX ADMIN — MEROPENEM 1 G: 1 INJECTION, POWDER, FOR SOLUTION INTRAVENOUS at 12:11

## 2022-01-01 RX ADMIN — NYSTATIN 500000 UNITS: 100000 SUSPENSION ORAL at 20:50

## 2022-01-01 RX ADMIN — HALOPERIDOL LACTATE 1 MG: 5 INJECTION, SOLUTION INTRAMUSCULAR at 15:15

## 2022-01-01 RX ADMIN — HYDROMORPHONE HYDROCHLORIDE 0.5 MG: 1 INJECTION, SOLUTION INTRAMUSCULAR; INTRAVENOUS; SUBCUTANEOUS at 07:00

## 2022-01-01 RX ADMIN — Medication 5 ML: at 19:41

## 2022-01-01 RX ADMIN — MEROPENEM 500 MG: 500 INJECTION, POWDER, FOR SOLUTION INTRAVENOUS at 10:14

## 2022-01-01 RX ADMIN — Medication 5 ML: at 13:28

## 2022-01-01 RX ADMIN — ONDANSETRON 4 MG: 2 INJECTION INTRAMUSCULAR; INTRAVENOUS at 17:15

## 2022-01-01 RX ADMIN — ONDANSETRON 16 MG: 4 TABLET, ORALLY DISINTEGRATING ORAL at 14:00

## 2022-01-01 RX ADMIN — SUGAMMADEX 200 MG: 100 INJECTION, SOLUTION INTRAVENOUS at 18:02

## 2022-01-01 RX ADMIN — OXYCODONE HYDROCHLORIDE 10 MG: 5 TABLET ORAL at 08:56

## 2022-01-01 RX ADMIN — ACETAMINOPHEN 650 MG: 325 TABLET ORAL at 23:57

## 2022-01-01 RX ADMIN — ONDANSETRON 8 MG: 2 INJECTION INTRAMUSCULAR; INTRAVENOUS at 11:20

## 2022-01-01 RX ADMIN — ONDANSETRON 8 MG: 2 INJECTION INTRAMUSCULAR; INTRAVENOUS at 10:16

## 2022-01-01 RX ADMIN — FOSAPREPITANT: 150 INJECTION, POWDER, LYOPHILIZED, FOR SOLUTION INTRAVENOUS at 08:34

## 2022-01-01 RX ADMIN — FAMOTIDINE 20 MG: 10 INJECTION INTRAVENOUS at 10:38

## 2022-01-01 RX ADMIN — FAMOTIDINE 20 MG: 10 INJECTION INTRAVENOUS at 14:19

## 2022-01-01 RX ADMIN — Medication 5 ML: at 09:54

## 2022-01-01 RX ADMIN — Medication 5 ML: at 14:37

## 2022-01-01 RX ADMIN — FAMOTIDINE 20 MG: 10 INJECTION INTRAVENOUS at 11:04

## 2022-01-01 RX ADMIN — METHOCARBAMOL 500 MG: 500 TABLET, FILM COATED ORAL at 13:31

## 2022-01-01 RX ADMIN — ROCURONIUM BROMIDE 30 MG: 50 INJECTION, SOLUTION INTRAVENOUS at 14:03

## 2022-01-01 RX ADMIN — SODIUM CHLORIDE 1000 ML: 9 INJECTION, SOLUTION INTRAVENOUS at 13:18

## 2022-01-01 RX ADMIN — POTASSIUM CHLORIDE 20 MEQ: 1500 TABLET, EXTENDED RELEASE ORAL at 07:35

## 2022-01-01 RX ADMIN — AMLODIPINE BESYLATE 5 MG: 5 TABLET ORAL at 09:20

## 2022-01-01 RX ADMIN — Medication 5 ML: at 14:00

## 2022-01-01 RX ADMIN — Medication 5 ML: at 11:14

## 2022-01-01 RX ADMIN — NYSTATIN 500000 UNITS: 100000 SUSPENSION ORAL at 17:41

## 2022-01-01 RX ADMIN — SODIUM CHLORIDE: 9 INJECTION, SOLUTION INTRAVENOUS at 19:43

## 2022-01-01 RX ADMIN — HYDROCORTISONE 10 MG: 5 TABLET ORAL at 15:21

## 2022-01-01 RX ADMIN — DEXAMETHASONE SODIUM PHOSPHATE 20 MG: 10 INJECTION, SOLUTION INTRAMUSCULAR; INTRAVENOUS at 14:05

## 2022-01-01 RX ADMIN — SODIUM CHLORIDE 1000 ML: 9 INJECTION, SOLUTION INTRAVENOUS at 10:05

## 2022-01-01 RX ADMIN — SODIUM CHLORIDE 1000 ML: 9 INJECTION, SOLUTION INTRAVENOUS at 11:05

## 2022-01-01 RX ADMIN — IOPAMIDOL 63 ML: 755 INJECTION, SOLUTION INTRAVENOUS at 09:46

## 2022-01-01 RX ADMIN — LIDOCAINE HYDROCHLORIDE 30 MG: 10 INJECTION, SOLUTION INFILTRATION; PERINEURAL at 17:27

## 2022-01-01 RX ADMIN — POTASSIUM CHLORIDE 40 MEQ: 1500 TABLET, EXTENDED RELEASE ORAL at 16:08

## 2022-01-01 RX ADMIN — ONDANSETRON 8 MG: 2 INJECTION INTRAMUSCULAR; INTRAVENOUS at 13:26

## 2022-01-01 RX ADMIN — SODIUM CHLORIDE 1000 ML: 9 INJECTION, SOLUTION INTRAVENOUS at 14:52

## 2022-01-01 RX ADMIN — SODIUM CHLORIDE: 9 INJECTION, SOLUTION INTRAVENOUS at 10:15

## 2022-01-01 RX ADMIN — DEXAMETHASONE SODIUM PHOSPHATE 10 MG: 10 INJECTION, SOLUTION INTRAMUSCULAR; INTRAVENOUS at 14:03

## 2022-01-01 RX ADMIN — LIDOCAINE HYDROCHLORIDE 3 ML: 10 INJECTION, SOLUTION INFILTRATION; PERINEURAL at 14:03

## 2022-01-01 RX ADMIN — FOSAPREPITANT: 150 INJECTION, POWDER, LYOPHILIZED, FOR SOLUTION INTRAVENOUS at 12:15

## 2022-01-01 RX ADMIN — Medication 5 ML: at 12:06

## 2022-01-01 RX ADMIN — FENTANYL CITRATE 100 MCG: 50 INJECTION, SOLUTION INTRAMUSCULAR; INTRAVENOUS at 17:27

## 2022-01-01 RX ADMIN — GEMCITABINE 1200 MG: 38 INJECTION, SOLUTION INTRAVENOUS at 11:13

## 2022-01-01 RX ADMIN — Medication 5 ML: at 14:47

## 2022-01-01 RX ADMIN — AMLODIPINE BESYLATE 5 MG: 5 TABLET ORAL at 09:21

## 2022-01-01 RX ADMIN — SODIUM CHLORIDE 1000 ML: 9 INJECTION, SOLUTION INTRAVENOUS at 11:00

## 2022-01-01 RX ADMIN — POTASSIUM CHLORIDE 10 MEQ: 7.46 INJECTION, SOLUTION INTRAVENOUS at 00:34

## 2022-01-01 RX ADMIN — ONDANSETRON 4 MG: 2 INJECTION INTRAMUSCULAR; INTRAVENOUS at 14:39

## 2022-01-01 RX ADMIN — SODIUM CHLORIDE 1000 ML: 9 INJECTION, SOLUTION INTRAVENOUS at 09:31

## 2022-01-01 RX ADMIN — LIDOCAINE PATCH 4% 1 PATCH: 40 PATCH TOPICAL at 00:43

## 2022-01-01 RX ADMIN — SODIUM CHLORIDE 1000 ML: 9 INJECTION, SOLUTION INTRAVENOUS at 17:49

## 2022-01-01 RX ADMIN — SODIUM CHLORIDE 250 ML: 9 INJECTION, SOLUTION INTRAVENOUS at 09:42

## 2022-01-01 RX ADMIN — Medication 5 ML: at 10:36

## 2022-01-01 RX ADMIN — HYDROCORTISONE 20 MG: 20 TABLET ORAL at 09:19

## 2022-01-01 RX ADMIN — SODIUM CHLORIDE 1000 ML: 9 INJECTION, SOLUTION INTRAVENOUS at 10:37

## 2022-01-01 RX ADMIN — FOSAPREPITANT: 150 INJECTION, POWDER, LYOPHILIZED, FOR SOLUTION INTRAVENOUS at 11:37

## 2022-01-01 RX ADMIN — HYDROCORTISONE 20 MG: 20 TABLET ORAL at 17:06

## 2022-01-01 RX ADMIN — ONDANSETRON 8 MG: 2 INJECTION INTRAMUSCULAR; INTRAVENOUS at 08:26

## 2022-01-01 RX ADMIN — GEMCITABINE 1200 MG: 38 INJECTION, SOLUTION INTRAVENOUS at 11:30

## 2022-01-01 RX ADMIN — LIDOCAINE: 50 OINTMENT TOPICAL at 17:06

## 2022-01-01 RX ADMIN — PROCHLORPERAZINE EDISYLATE 5 MG: 5 INJECTION INTRAMUSCULAR; INTRAVENOUS at 06:45

## 2022-01-01 RX ADMIN — HYDROCORTISONE 10 MG: 10 TABLET ORAL at 17:41

## 2022-01-01 RX ADMIN — SODIUM CHLORIDE 1000 ML: 9 INJECTION, SOLUTION INTRAVENOUS at 15:08

## 2022-01-01 RX ADMIN — ONDANSETRON 8 MG: 2 INJECTION INTRAMUSCULAR; INTRAVENOUS at 15:51

## 2022-01-01 RX ADMIN — POTASSIUM CHLORIDE 40 MEQ: 20 TABLET, EXTENDED RELEASE ORAL at 15:12

## 2022-01-01 RX ADMIN — SODIUM CHLORIDE 1000 ML: 9 INJECTION, SOLUTION INTRAVENOUS at 08:03

## 2022-01-01 RX ADMIN — Medication 5 ML: at 10:25

## 2022-01-01 RX ADMIN — ACETAMINOPHEN 650 MG: 325 TABLET ORAL at 14:29

## 2022-01-01 RX ADMIN — ONDANSETRON 8 MG: 2 INJECTION INTRAMUSCULAR; INTRAVENOUS at 14:19

## 2022-01-01 RX ADMIN — OXYCODONE HYDROCHLORIDE 10 MG: 5 TABLET ORAL at 09:25

## 2022-01-01 RX ADMIN — PROCHLORPERAZINE EDISYLATE 5 MG: 5 INJECTION INTRAMUSCULAR; INTRAVENOUS at 07:06

## 2022-01-01 RX ADMIN — HYDROCORTISONE 20 MG: 20 TABLET ORAL at 08:23

## 2022-01-01 RX ADMIN — LIDOCAINE: 50 OINTMENT TOPICAL at 23:57

## 2022-01-01 RX ADMIN — OXYCODONE HYDROCHLORIDE 10 MG: 5 TABLET ORAL at 09:07

## 2022-01-01 RX ADMIN — HYDROCODONE BITARTRATE AND ACETAMINOPHEN 1 TABLET: 5; 325 TABLET ORAL at 02:19

## 2022-01-01 RX ADMIN — DIAZEPAM 2 MG: 5 INJECTION, SOLUTION INTRAMUSCULAR; INTRAVENOUS at 16:58

## 2022-01-01 RX ADMIN — HYDROMORPHONE HYDROCHLORIDE 0.5 MG: 1 INJECTION, SOLUTION INTRAMUSCULAR; INTRAVENOUS; SUBCUTANEOUS at 14:52

## 2022-01-01 RX ADMIN — Medication 5 ML: at 10:33

## 2022-01-01 RX ADMIN — OXYCODONE HYDROCHLORIDE 2.5 MG: 5 TABLET ORAL at 17:06

## 2022-01-01 RX ADMIN — SODIUM CHLORIDE, POTASSIUM CHLORIDE, SODIUM LACTATE AND CALCIUM CHLORIDE: 600; 310; 30; 20 INJECTION, SOLUTION INTRAVENOUS at 21:32

## 2022-01-01 RX ADMIN — DEXAMETHASONE SODIUM PHOSPHATE: 10 INJECTION, SOLUTION INTRAMUSCULAR; INTRAVENOUS at 16:30

## 2022-01-01 RX ADMIN — SODIUM CHLORIDE 1000 ML: 9 INJECTION, SOLUTION INTRAVENOUS at 09:59

## 2022-01-01 RX ADMIN — Medication 500 UNITS: at 10:27

## 2022-01-01 RX ADMIN — HYDROMORPHONE HYDROCHLORIDE 0.2 MG: 0.2 INJECTION, SOLUTION INTRAMUSCULAR; INTRAVENOUS; SUBCUTANEOUS at 00:31

## 2022-01-01 RX ADMIN — LEVOTHYROXINE SODIUM 75 MCG: 0.03 TABLET ORAL at 06:30

## 2022-01-01 RX ADMIN — LIDOCAINE: 50 OINTMENT TOPICAL at 08:56

## 2022-01-01 RX ADMIN — ESCITALOPRAM OXALATE 10 MG: 10 TABLET ORAL at 20:11

## 2022-01-01 RX ADMIN — SUGAMMADEX 200 MG: 100 INJECTION, SOLUTION INTRAVENOUS at 14:42

## 2022-01-01 RX ADMIN — NYSTATIN 500000 UNITS: 100000 SUSPENSION ORAL at 10:14

## 2022-01-01 RX ADMIN — SODIUM CHLORIDE 1000 ML: 9 INJECTION, SOLUTION INTRAVENOUS at 14:29

## 2022-01-01 RX ADMIN — HYDRALAZINE HYDROCHLORIDE 10 MG: 20 INJECTION INTRAMUSCULAR; INTRAVENOUS at 12:15

## 2022-01-01 RX ADMIN — SODIUM CHLORIDE, POTASSIUM CHLORIDE, SODIUM LACTATE AND CALCIUM CHLORIDE: 600; 310; 30; 20 INJECTION, SOLUTION INTRAVENOUS at 17:10

## 2022-01-01 RX ADMIN — PANTOPRAZOLE SODIUM 40 MG: 40 INJECTION, POWDER, FOR SOLUTION INTRAVENOUS at 16:24

## 2022-01-01 RX ADMIN — Medication 5 ML: at 14:25

## 2022-01-01 RX ADMIN — HEPARIN 5 ML: 100 SYRINGE at 14:46

## 2022-01-01 RX ADMIN — SODIUM CHLORIDE 1000 ML: 9 INJECTION, SOLUTION INTRAVENOUS at 09:53

## 2022-01-01 RX ADMIN — SODIUM CHLORIDE 59 ML: 9 INJECTION, SOLUTION INTRAVENOUS at 14:00

## 2022-01-01 RX ADMIN — LEVOTHYROXINE SODIUM 75 MCG: 0.03 TABLET ORAL at 05:35

## 2022-01-01 RX ADMIN — SODIUM CHLORIDE 500 ML: 9 INJECTION, SOLUTION INTRAVENOUS at 13:43

## 2022-01-01 RX ADMIN — LISINOPRIL 20 MG: 20 TABLET ORAL at 08:56

## 2022-01-01 RX ADMIN — LEVOTHYROXINE SODIUM 75 MCG: 0.03 TABLET ORAL at 05:58

## 2022-01-01 RX ADMIN — HYDROMORPHONE HYDROCHLORIDE 1 MG: 1 INJECTION, SOLUTION INTRAMUSCULAR; INTRAVENOUS; SUBCUTANEOUS at 03:12

## 2022-01-01 RX ADMIN — LEVOTHYROXINE SODIUM 75 MCG: 0.05 TABLET ORAL at 07:25

## 2022-01-01 RX ADMIN — IOPAMIDOL 61 ML: 755 INJECTION, SOLUTION INTRAVENOUS at 15:09

## 2022-01-01 RX ADMIN — ACETAMINOPHEN 650 MG: 325 TABLET ORAL at 13:31

## 2022-01-01 RX ADMIN — ONDANSETRON: 2 INJECTION INTRAMUSCULAR; INTRAVENOUS at 10:42

## 2022-01-01 RX ADMIN — HYDROCORTISONE SODIUM SUCCINATE 10 MG: 100 INJECTION, POWDER, FOR SOLUTION INTRAMUSCULAR; INTRAVENOUS at 20:16

## 2022-01-01 RX ADMIN — LEVOTHYROXINE SODIUM 75 MCG: 0.03 TABLET ORAL at 06:48

## 2022-01-01 ASSESSMENT — ACTIVITIES OF DAILY LIVING (ADL)
ADLS_ACUITY_SCORE: 33
ADLS_ACUITY_SCORE: 20
CHANGE_IN_FUNCTIONAL_STATUS_SINCE_ONSET_OF_CURRENT_ILLNESS/INJURY: NO
TOILETING_ISSUES: NO
ADLS_ACUITY_SCORE: 20
ADLS_ACUITY_SCORE: 33
ADLS_ACUITY_SCORE: 22
ADLS_ACUITY_SCORE: 35
ADLS_ACUITY_SCORE: 22
ADLS_ACUITY_SCORE: 35
ADLS_ACUITY_SCORE: 31
ADLS_ACUITY_SCORE: 35
ADLS_ACUITY_SCORE: 33
ADLS_ACUITY_SCORE: 33
ADLS_ACUITY_SCORE: 22
WEAR_GLASSES_OR_BLIND: YES
ADLS_ACUITY_SCORE: 35
ADLS_ACUITY_SCORE: 20
VISION_MANAGEMENT: READERS
ADLS_ACUITY_SCORE: 20
ADLS_ACUITY_SCORE: 35
ADLS_ACUITY_SCORE: 33
ADLS_ACUITY_SCORE: 22
ADLS_ACUITY_SCORE: 35
ADLS_ACUITY_SCORE: 35
ADLS_ACUITY_SCORE: 20
ADLS_ACUITY_SCORE: 33
ADLS_ACUITY_SCORE: 22
ADLS_ACUITY_SCORE: 33
ADLS_ACUITY_SCORE: 20
ADLS_ACUITY_SCORE: 35
ADLS_ACUITY_SCORE: 31
ADLS_ACUITY_SCORE: 22
ADLS_ACUITY_SCORE: 22
DOING_ERRANDS_INDEPENDENTLY_DIFFICULTY: NO
ADLS_ACUITY_SCORE: 20
ADLS_ACUITY_SCORE: 33
ADLS_ACUITY_SCORE: 20
ADLS_ACUITY_SCORE: 22
ADLS_ACUITY_SCORE: 20
ADLS_ACUITY_SCORE: 33
ADLS_ACUITY_SCORE: 20
ADLS_ACUITY_SCORE: 31
ADLS_ACUITY_SCORE: 33
ADLS_ACUITY_SCORE: 33
ADLS_ACUITY_SCORE: 20
DRESSING/BATHING_DIFFICULTY: NO
ADLS_ACUITY_SCORE: 33
CONCENTRATING,_REMEMBERING_OR_MAKING_DECISIONS_DIFFICULTY: NO
ADLS_ACUITY_SCORE: 20
ADLS_ACUITY_SCORE: 31
ADLS_ACUITY_SCORE: 20
ADLS_ACUITY_SCORE: 35
ADLS_ACUITY_SCORE: 28
ADLS_ACUITY_SCORE: 33
ADLS_ACUITY_SCORE: 33
WALKING_OR_CLIMBING_STAIRS_DIFFICULTY: NO
ADLS_ACUITY_SCORE: 35
ADLS_ACUITY_SCORE: 20
ADLS_ACUITY_SCORE: 20
ADLS_ACUITY_SCORE: 31
ADLS_ACUITY_SCORE: 33
ADLS_ACUITY_SCORE: 20
ADLS_ACUITY_SCORE: 33
ADLS_ACUITY_SCORE: 33
ADLS_ACUITY_SCORE: 20
ADLS_ACUITY_SCORE: 22
ADLS_ACUITY_SCORE: 31
ADLS_ACUITY_SCORE: 33
ADLS_ACUITY_SCORE: 22
ADLS_ACUITY_SCORE: 35
FALL_HISTORY_WITHIN_LAST_SIX_MONTHS: NO
ADLS_ACUITY_SCORE: 31
ADLS_ACUITY_SCORE: 35
ADLS_ACUITY_SCORE: 33
ADLS_ACUITY_SCORE: 33
ADLS_ACUITY_SCORE: 35
ADLS_ACUITY_SCORE: 20
ADLS_ACUITY_SCORE: 22
ADLS_ACUITY_SCORE: 20
ADLS_ACUITY_SCORE: 35
ADLS_ACUITY_SCORE: 20
ADLS_ACUITY_SCORE: 33
ADLS_ACUITY_SCORE: 33
ADLS_ACUITY_SCORE: 20
ADLS_ACUITY_SCORE: 33
DIFFICULTY_EATING/SWALLOWING: NO
ADLS_ACUITY_SCORE: 20
ADLS_ACUITY_SCORE: 22
ADLS_ACUITY_SCORE: 33
ADLS_ACUITY_SCORE: 20
ADLS_ACUITY_SCORE: 22
ADLS_ACUITY_SCORE: 22
ADLS_ACUITY_SCORE: 35
ADLS_ACUITY_SCORE: 33
ADLS_ACUITY_SCORE: 35
ADLS_ACUITY_SCORE: 33
ADLS_ACUITY_SCORE: 22
ADLS_ACUITY_SCORE: 35
ADLS_ACUITY_SCORE: 22
ADLS_ACUITY_SCORE: 33
ADLS_ACUITY_SCORE: 22
ADLS_ACUITY_SCORE: 35
ADLS_ACUITY_SCORE: 20
ADLS_ACUITY_SCORE: 20

## 2022-01-01 ASSESSMENT — ENCOUNTER SYMPTOMS
DIARRHEA: 0
NAUSEA: 0
APPETITE CHANGE: 1
SHORTNESS OF BREATH: 0
GASTROINTESTINAL NEGATIVE: 1
RESPIRATORY NEGATIVE: 1
ALLERGIC/IMMUNOLOGIC NEGATIVE: 1
ABDOMINAL PAIN: 0
CONFUSION: 0
HEADACHES: 0
WEAKNESS: 1
NAUSEA: 1
EYES NEGATIVE: 1
CONSTIPATION: 0
ENDOCRINE NEGATIVE: 1
SEIZURES: 0
NEUROLOGICAL NEGATIVE: 1
FEVER: 0
RESPIRATORY NEGATIVE: 1
CARDIOVASCULAR NEGATIVE: 1
CARDIOVASCULAR NEGATIVE: 1
EYES NEGATIVE: 1
HEMATOLOGIC/LYMPHATIC NEGATIVE: 1
CONSTITUTIONAL NEGATIVE: 1
PSYCHIATRIC NEGATIVE: 1
EYE REDNESS: 0
DIFFICULTY URINATING: 0
BACK PAIN: 0
ALLERGIC/IMMUNOLOGIC NEGATIVE: 1
ACTIVITY CHANGE: 1
MUSCULOSKELETAL NEGATIVE: 1
COUGH: 0
MUSCULOSKELETAL NEGATIVE: 1
ENDOCRINE NEGATIVE: 1
HEMATOLOGIC/LYMPHATIC NEGATIVE: 1

## 2022-01-01 ASSESSMENT — PAIN SCALES - GENERAL
PAINLEVEL: SEVERE PAIN (6)
PAINLEVEL: MILD PAIN (2)
PAINLEVEL: NO PAIN (0)
PAINLEVEL: NO PAIN (0)
PAINLEVEL: MILD PAIN (2)

## 2022-01-01 ASSESSMENT — COLUMBIA-SUICIDE SEVERITY RATING SCALE - C-SSRS
3. HAVE YOU BEEN THINKING ABOUT HOW YOU MIGHT KILL YOURSELF?: NO
5. HAVE YOU STARTED TO WORK OUT OR WORKED OUT THE DETAILS OF HOW TO KILL YOURSELF? DO YOU INTEND TO CARRY OUT THIS PLAN?: NO
1. IN THE PAST MONTH, HAVE YOU WISHED YOU WERE DEAD OR WISHED YOU COULD GO TO SLEEP AND NOT WAKE UP?: NO
4. HAVE YOU HAD THESE THOUGHTS AND HAD SOME INTENTION OF ACTING ON THEM?: NO
2. HAVE YOU ACTUALLY HAD ANY THOUGHTS OF KILLING YOURSELF IN THE PAST MONTH?: NO
6. HAVE YOU EVER DONE ANYTHING, STARTED TO DO ANYTHING, OR PREPARED TO DO ANYTHING TO END YOUR LIFE?: NO

## 2022-01-03 ENCOUNTER — OFFICE VISIT (OUTPATIENT)
Dept: SURGERY | Facility: CLINIC | Age: 72
End: 2022-01-03
Payer: MEDICARE

## 2022-01-03 VITALS
SYSTOLIC BLOOD PRESSURE: 152 MMHG | DIASTOLIC BLOOD PRESSURE: 83 MMHG | RESPIRATION RATE: 18 BRPM | HEART RATE: 87 BPM | OXYGEN SATURATION: 99 %

## 2022-01-03 DIAGNOSIS — Z01.818 PREOP TESTING: ICD-10-CM

## 2022-01-03 DIAGNOSIS — C45.0 MESOTHELIOMA (PLEURAL) (H): Primary | ICD-10-CM

## 2022-01-03 LAB — SARS-COV-2 RNA RESP QL NAA+PROBE: NEGATIVE

## 2022-01-03 PROCEDURE — U0005 INFEC AGEN DETEC AMPLI PROBE: HCPCS | Performed by: SURGERY

## 2022-01-03 PROCEDURE — U0003 INFECTIOUS AGENT DETECTION BY NUCLEIC ACID (DNA OR RNA); SEVERE ACUTE RESPIRATORY SYNDROME CORONAVIRUS 2 (SARS-COV-2) (CORONAVIRUS DISEASE [COVID-19]), AMPLIFIED PROBE TECHNIQUE, MAKING USE OF HIGH THROUGHPUT TECHNOLOGIES AS DESCRIBED BY CMS-2020-01-R: HCPCS | Performed by: SURGERY

## 2022-01-03 PROCEDURE — 99213 OFFICE O/P EST LOW 20 MIN: CPT | Performed by: SURGERY

## 2022-01-03 NOTE — PROGRESS NOTES
PCP:  Sukhdev Kohler    Chief complaint: Malignant mesothelioma undergoing chemotherapy, need for venous access    History of Present Illness: Yadiel is a 71-year-old man undergoing chemotherapy for cancer.  The specifics of the disease are not important other than he needs venous access for chemotherapy.  Up until now he has been admitted to the hospital and PICC line has been placed.  He spends 1 week in the hospital and then is discharged.  His PICC line has always been removed up until now.  He is requesting now that he get a port to make this part easier.  His peripheral veins are pretty much destroyed from previous treatments.    He is right-handed.  He is an avid golfer.  No history of clavicle fractures or rib fractures.  No history of blood clots in the deep venous system or superficial venous system of his arms.  He is not on a blood thinner.  He does not take aspirin.    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     Thyroid disease     removed pituitary gland       Past Surgical History:   Procedure Laterality Date     COLONOSCOPY N/A 12/17/2020    Procedure: COLONOSCOPY;  Surgeon: Ken Camacho MD;  Location: [facility]     ENT SURGERY       HERNIA REPAIR       LARYNGOSCOPY, EXCISE VOCAL CORD LESION MICROSCOPIC, COMBINED Left 07/01/2021    Procedure: MICROLARYNGOSCOPY, LEFT TRUE VOCAL CORD INJECTION WITH PROLARYN;  Surgeon: Kyree Bearden MD;  Location: [facility]     PHACOEMULSIFICATION WITH STANDARD INTRAOCULAR LENS IMPLANT Right 03/10/2021    Procedure: Cataract removal with implant.;  Surgeon: Jamir Mac MD;  Location: [facility]     PHACOEMULSIFICATION WITH STANDARD INTRAOCULAR LENS IMPLANT Left 04/05/2021    Procedure: Cataract removal with implant.;  Surgeon: Jamir Mac MD;  Location: [facility]     PICC DOUBLE LUMEN PLACEMENT Right 12/01/2021    5FR DL PICC, basilic vein.  "L-38cm, 1cm out.     PITUITARY EXCISION       tooth pulled 4/7  Right        Family History   Problem Relation Age of Onset     Lupus Mother      ALS Father      Rheumatoid Arthritis Sister        Social History     Tobacco Use     Smoking status: Never Smoker     Smokeless tobacco: Never Used   Substance Use Topics     Alcohol use: Yes     Comment: rare       Current Outpatient Medications   Medication Sig Dispense Refill     acetaminophen (TYLENOL) 500 MG tablet Take 500-1,000 mg by mouth every 6 hours as needed for mild pain       calcium carbonate (TUMS) 500 MG chewable tablet Take 1 tablet (500 mg) by mouth 3 times daily as needed for heartburn       celecoxib (CELEBREX) 200 MG capsule Take 1 capsule (200 mg) by mouth 2 times daily. Note this is a new a strength! Only one capsule at a time! 60 capsule 3     cholecalciferol (VITAMIN D-1000 MAX ST) 1000 units TABS Take 1,000 Units by mouth daily        doxepin (SINEQUAN) 10 MG/ML (HIGH CONC) solution Take 2 mLs (20 mg) by mouth every 4 hours as needed for other (mouth pain) . Mix with equal parts tap water. Swish and hold in mouth ~60 seconds then spit out. 118 mL 3     guaiFENesin-codeine (GUAIFENESIN AC) 100-10 MG/5ML syrup Take 10 mLs by mouth every 4 hours as needed for cough 118 mL 0     hydrocortisone (CORTEF) 10 MG tablet Take 20 mg in the morning and 10 mg in the afternoon       Insulin Syringe-Needle U-100 27.5G X 5/8\" 2 ML MISC 1 each daily as needed (with solucortef for adrenal crisis) 10 each 1     levothyroxine (SYNTHROID/LEVOTHROID) 75 MCG tablet Take 75 mcg by mouth every morning        LORazepam (ATIVAN) 0.5 MG tablet Take 1 tablet (0.5 mg) by mouth every 4 hours as needed for nausea or vomiting . Caution: causes sedation. 30 tablet 0     Menthol-Methyl Salicylate (DALIA MALIK GREASELESS) cream Apply topically every 6 hours as needed       metroNIDAZOLE (METROGEL) 0.75 % external gel Apply topically 2 times daily 45 g 11     nystatin (MYCOSTATIN) " 383571 UNIT/ML suspension Take 5 mLs (500,000 Units) by mouth 4 times daily . Start at first signs of white coating on tongue. 473 mL 0     OLANZapine (ZYPREXA) 5 MG tablet Take 1 tablet (5 mg) by mouth At Bedtime Continue until your nausea resolves 30 tablet 1     omeprazole (PRILOSEC) 20 MG DR capsule Take 2 capsules (40 mg) by mouth daily 60 capsule 1     ondansetron (ZOFRAN) 4 MG tablet Take 1-2 tablets (4-8 mg) by mouth every 8 hours as needed for nausea 40 tablet 1     phosphorus tablet 250 mg (PHOSPHA 250 NEUTRAL) 250 MG per tablet Take 2 tablets (500 mg) by mouth 2 times daily 60 tablet 1     potassium chloride ER (K-TAB) 20 MEQ CR tablet Take 1 tablet (20 mEq) by mouth daily 30 tablet 1     prochlorperazine (COMPAZINE) 5 MG tablet Take 1 tablet (5 mg) by mouth every 6 hours as needed for nausea or vomiting . Caution: causes sedation. 30 tablet 1     triamcinolone (KENALOG) 0.1 % external cream Apply topically 2 times daily          Allergies   Allergen Reactions     Penicillins Hives and Swelling              Images:  No results found for this or any previous visit (from the past 744 hour(s)).    Labs:  No results found for any visits on 01/03/22.    ROS:  Problem focused review of systems is negative except as above  BP (!) 152/83 (BP Location: Right arm, Patient Position: Chair, Cuff Size: Adult Regular)   Pulse 87   Resp 18   SpO2 99%     Exam:  General - Alert and Oriented X4, NAD, well nourished  HEENT - Normocephalic, atraumatic  Neck - supple  Lungs -respirations unlabored, lungs are clear  CV - Heart RRR  Abdomen - Soft, non-tender  Neuro -grossly intact  Extremities - No cyanosis, clubbing or edema.      Assessment and Plan: Patient presents for placement of a venous access port.  He is a good candidate for this procedure.  He is very happy to learn that blood can be drawn from this device.  I spent about 15 minutes with him discussing the anatomy and the technical aspects of placing the device.   Risks benefits and alternatives were discussed.  Specifically we discussed the risk of bleeding, infection, and pneumothorax.  We will attempt to get this on the schedule Wednesday.  He does not need a preop.  Covid testing will be done today.        Vishal Echevarria MD FACS

## 2022-01-03 NOTE — NURSING NOTE
"Chief Complaint   Patient presents with     Consult     Port placement        Initial BP (!) 152/83 (BP Location: Right arm, Patient Position: Chair, Cuff Size: Adult Regular)   Pulse 87   Resp 18   SpO2 99%  Estimated body mass index is 23.02 kg/m  as calculated from the following:    Height as of 12/9/21: 1.702 m (5' 7\").    Weight as of 12/9/21: 66.7 kg (147 lb).  BP completed using cuff size: regular   Medications and allergies reviewed.      Luz LAO CMA     "

## 2022-01-03 NOTE — PATIENT INSTRUCTIONS
Per physician instructions.    If you have questions or concerns on any instructions given to you by your provider today or if you need to schedule an appointment, you can reach us at [phone].  Listen to the menu for the Specialty Clinic option.      Thank you!

## 2022-01-03 NOTE — H&P
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 Thrush noted on admission, continues on Nystatin.     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 Wolff. Patient presented to his PCP in 03/2021 with chest/left shoulder and back pain that led to imaging which revealed multiple lung mets, included a left pleural mass. There were difficulties in getting diagnostic biopsy; eventually, biopsy of the mediastinal mass (5/20/21) revealed a poorly characterized malignant spindle cell neoplasm with high PD-L1 expression (90%), Ki-67 70%. 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 Insufficient material to send [facility]. Baseline imaging (6/21/21) revealed progression of lung mets and left-sided subdiaphragmatic mass, stable liver lesions. He was seen by ENT for hoarseness, which was attributed to left true vocal fold motion impairment from mediastinal mass. Given high PD-L1 expression, he was started on Keytruda (C1=6/21/21). Interval imaging (CT 8/2/21) revealed positive response to treatment. He received 6 cycles total, most recently C6=10/4/21. Unfortunately, restaging imaging (10/18/21) revealed interval increase in size of the LUQ/splenic mass; however, the mediastinal and left pleural mass were stable to slightly decreased in size. He met with Dr. Wolff, who recommended a change in therapy to Doxil/ifosfamide. He was admitted on 10/26/21 to receive Cycle #1 of Doxil/ifos which he tolerated  reasonably well with no neurotoxicities, but did have moderate chemotherapy-induced nausea. After discharge he had significant mucositis, poor PO intake, nausea, and lyte derangements. Given adverse side effects, Cycle 2 was delayed by 1 week and doxil was dose reduced to 70mg (previously given 80mg, ie 45mg/m2), Ifosfamide reduced by 10% (1500mg/m2 ? 1350mg/m2).   - Tolerated Cycle 2 Doxil/Ifos (D1=12/1/21) better, so plan to proceed with same dosing with cycle 3.   - PICC ordered on admission, remove at discharge. Port placement previously scheduled for 1/5/22 though canceled d/t this admission. Will emmanuel to rechedule port before Cycle 4.   - Outpatient team plans to restage with repeat imaging after Cycle 4.                                Treatment Plan: Doxil + ifosfamide (C3D1=1/4/22).   Doxil 70mg IV once - D1    Ifosfamide/Mesna 1350 mg/m2 (2510 mg) CIVI - D1-6    Mesna 1350 mg/m2 (2510 mg) IV over 18 hrs - starting D7    Pre-meds: Emend 150 mg once Day 1 and Day 6; Zofran 8 mg Q8H    Neulasta injection - D8, schedule at [facility]     # Cancer-related pain  - Continue PTA Celebrex 200 mg BID   - Continue PTA topical Bengay PRN at bedtime to left back      # Normocytic anemia  Noted intermittently. Likely related to chemo and underlying disease.   - Transfuse to maintain Hgb >7, will sign blood consent if indicated     RENAL/FEN  # H/o HUSSEIN  Baseline Cr ~0.8 which is noted to transiently increased with previously chemo cycles, presumed d/t hypovolemia in the setting of nausea with poor PO intake.   - Continue with twice weekly IVF infusion appts OP  - Follow daily CMP  - No mIVF; bolus PRN.      # Hypophosphatemia, intermittent  # Hypokalemia, intermittent  Secondary to ifosfamide. Required additional phos and K replacement after discharge from C1 Doxil/Ifos.  - Follow Phos and K daily while inpatient  - Continue PTA PO Phos and KCl   - Replete additional per standing protocol     GI  # Transaminitis  First  noted in late Nov: , , TBili WNL on 11/29/21. PTA statin was subsequently held. Per outpatient notes, he did not have any abdominal pain or other symptoms concerning for viral hepatitis. Discussed with Dr. Wolff, and it was discussed that this could be due to his statin and OK to proceed with chemotherapy as planned above. He also has known liver metastases which are likely contributing.   - Continue to hold PTA statin  - Follow LFTs daily     # History of chemotherapy-induced nausea  # Indigestion  Improved with the following regimen:  - Scheduled Zofran Q8H  - PRN compazine and Ativan  - Emend 150 mg IV x1 on D1 and D6 of chemo regimen  - Zyprexa 5 mg at bedtime which has been helpful in past      # GERD  - Continue PTA omeprazole  - TUMS and Pepto Bismol available PRN      CV  # CAD   Followed by Dr. Gigi Vernon at [facility]. He was noted to have coronary calcium on chest CT. CTA 9/29/21 with moderate mid LAD stenosis. Small to moderate caliber ramus branch with severe diffuse disease. Echo completed 9/29/21 with normal EF and no valvular dysfunction. Repeat ECHO on 10/27 with EF 60-65%, early diastolic dysfunction but otherwise no change from previous.  - PTA rosuvastatin 40 mg daily HELD for transaminitis  - Encourage outpatient cardiology follow-up as recommended     # H/o intermittent elevated blood pressures  BPs ranging from normotensive to hypertensive (with SBP 140s-160) during previous admissions. He states that he runs higher at baseline (though this is based on clinic assessments as he does not typically monitor his BP at home). Appears recent intermittent clinic BPs sit at 140s-150s/80s-90s when seen in person, otherwise many of the visits are currently virtual. He is not on any antihypertensive medications PTA. Suspect he has undiagnosed essential HTN. Will monitor closely this admission.    - Would recommend outpatient PCP follow-up for further discussion of BP  monitoring and management.      ENDO  # Panhypopituitarism  # Macroadenoma of the pituitary gland  Diagnosed in 2010. Found to have a pituitary macroadenoma, 1.9 cm in largest dimension. Underwent hypophysectomy on 8/23/10. Subsequent presumptive pituitary deficiency, on empiric meds for adrenal insufficiency and thyroid. Previously followed by Dr. Bill cMcall; now has established care with Dr. Jamir Grant on 11/11/21.  - Continue PTA Levothyroxine  - Continue PTA Hydrocortisone 15 mg qAM and 10 mg qPM  - Continue endocrinology follow-up as scheduled (next scheduled for 5/16/22)     ENT  # Vocal cord dysfunction  Manifested as hoarseness. Seen by ENT (Dr. Kyree Bearden) on 6/21/21 and noted to have left true vocal fold motion impairment from mediastinal mass. Underwent injection of ProLaryn (filler/bulking agent) on 7/1/21, which reportedly has been minimally beneficial. Seen in follow-up by Dr. Bearden on 9/1/21 and noted to have minimal improvement; he was then referred to [facility] ([facility]).   - No acute inpatient management issues  - Continue outpatient ENT follow-up as previously scheduled (next scheduled for 3/10/22)     # Oral candidiasis  # H/o mucositis  Significant 2/2 Doxil. Noted after C1 chemo, then better following C2 dose-reduction. On admission patient reports thrush again starting a couple days prior to admission. Restarted Nystatin at home on 1/3. Denies any mouth sores though endorses poor taste and tongue coating. Still with good PO intake at home.   - Continue salt/soda swishes  - Continue Nystatin QID with this cycle  - MMW, Doxepin available PRN      DERM  # H/o Hand/foot  2/2 Doxil. Quite bothersome with previous C2.   - Plan to ice hands and mouth during Doxil infusion    # Rosacea  - Continue PTA metronidazole gel     PSYCH  # History of anxiety/depression  Situational, related to malaise/illness. Previously placed health psych referral.  - No acute inpatient management  needs     FEN:  - Encouraged PO fluids, bolus PRN  - PRN lyte replacement  - RDAT     Prophy/Misc:  - VTE: ppx Lovenox 40mg daily during admission   - GI/PUD: PTA omeprazole, Tums PRN  - Bowels: Senna and Miralax PRN  - Activity: Consider consulting PT if indicated     Code: FULL  Disposition: Admitted to Oncology Team 3 service for scheduled chemotherapy. Anticipate discharge on 1/11.       Patient and plan of care was discussed with attending physician Dr. Quan.      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    Today patient reports feeling well. Does endorse some recurrent thrush which started day prior to admission. Affecting taste but denies sores and PO intake okay. Otherwise denies specific complaints. Reports has actually gained some weight. Reports nausea was much better with previous cycle. Still having some ongoing lower extremity swelling above sock line which has been fairly unchanged. Wife present at beside. Discussed plan for labs, PICC Placement and proceeding with chemotherapy which patient is agreeable to. Plan to remain inpatient for ~7 days pending chemo timing. All questions answered and patient is agreeable to proceed with chemo.     A comprehensive review of systems was obtained and is negative other than noted here or in the HPI.     Past Medical History    I have reviewed this patient's medical history and updated it with pertinent information if needed.   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     Thyroid disease     removed pituitary gland       Past  Surgical History   I have reviewed this patient's surgical history and updated it with pertinent information if needed.  Past Surgical History:   Procedure Laterality Date     COLONOSCOPY N/A 12/17/2020    Procedure: COLONOSCOPY;  Surgeon: Ken Camacho MD;  Location: [facility]     ENT SURGERY       HERNIA REPAIR       LARYNGOSCOPY, EXCISE VOCAL CORD LESION MICROSCOPIC, COMBINED Left 07/01/2021    Procedure: MICROLARYNGOSCOPY, LEFT TRUE VOCAL CORD INJECTION WITH PROLARYN;  Surgeon: Kyree Bearden MD;  Location: [facility]     PHACOEMULSIFICATION WITH STANDARD INTRAOCULAR LENS IMPLANT Right 03/10/2021    Procedure: Cataract removal with implant.;  Surgeon: Jamir Mac MD;  Location: [facility]     PHACOEMULSIFICATION WITH STANDARD INTRAOCULAR LENS IMPLANT Left 04/05/2021    Procedure: Cataract removal with implant.;  Surgeon: Jamir Mac MD;  Location: [facility]     PICC DOUBLE LUMEN PLACEMENT Right 12/01/2021    5FR DL PICC, basilic vein. L-38cm, 1cm out.     PITUITARY EXCISION       tooth pulled 4/7  Right        Prior to Admission Medications   Cannot display prior to admission medications because the patient has not been admitted in this contact.     Allergies   Allergies   Allergen Reactions     Penicillins Hives and Swelling              Social History   I have reviewed this patient's social history and updated it with pertinent information if needed. Ifrah Huitron  reports that he has never smoked. He has never used smokeless tobacco. He reports current alcohol use. He reports that he does not use drugs.    Family History   I have reviewed this patient's family history and updated it with pertinent information if needed.   Family History   Problem Relation Age of Onset     Lupus Mother      ALS Father      Rheumatoid Arthritis Sister        Review of Systems   The comprehensive point Review of Systems is negative other than noted in the HPI or here.     Vital Signs with Ranges  Pulse:  [87] 87  Resp:   [18] 18  BP: (152)/(83) 152/83  SpO2:  [99 %] 99 %  0 lbs 0 oz    General: Awake and interactive. No acute distress. Pleasant male seen sitting upright in bed.   Skin: Warm, dry, and intact without rashes or lesions.   Head: Normocephalic and atraumatic.   HEENT: White/yellow coating present to tongue. No sores visible.   Lymph: Neck supple.   Cardiac: Regular rate and rhythm.   Respiratory: No signs of respiratory distress or accessory muscle use. Lungs CTAB.  Abd: Soft, symmetric, and non-tender without distension. BS present and normoactive.   Extremities: 1+ pitting edema to bilateral LE above sock line.   Neuro: A&Ox3 with normal speech. Memory and thought process preserved. CN II-XII grossly intact.   Psych: Appropriate mood and affect.     Data   Results for orders placed or performed during the hospital encounter of 01/04/22 (from the past 24 hour(s))   CBC with platelets differential    Narrative    The following orders were created for panel order CBC with platelets differential.  Procedure                               Abnormality         Status                     ---------                               -----------         ------                     CBC with platelets and d...[[id number]]                                                   Please view results for these tests on the individual orders.       STAFF NOTE   Patient presented in 03/2021 with chest/left shoulder and back pain. Imaging showed multiple lung mets, included a left pleural mass. Biopsy of the mediastinal mass (5/20/21) revealed a poorly characterized malignant spindle cell neoplasm with high PD-L1 expression (90%), Ki-67 70% most compatible with malignant sarcomatoid mesothelioma. Baseline imaging (6/21/21) revealed progression of lung mets and left-sided subdiaphragmatic mass, stable liver lesions. He was seen by ENT for hoarseness, which was attributed to left true vocal fold motion impairment from mediastinal mass. Given high PD-L1  "expression, he was started on Keytruda (C1=6/21/21). Interval imaging (CT 8/2/21) revealed positive response to treatment. He received 6 cycles total, most recently C6=10/4/21. Unfortunately, restaging imaging (10/18/21) revealed interval increase in size of the LUQ/splenic mass; however, the mediastinal and left pleural mass were stable to slightly decreased in size.     He met with Dr. Wolff, who recommended a change in therapy to Doxil/ifosfamide. He was admitted on 10/26/21 to receive Cycle #1 of Doxil/ifos which he tolerated reasonably well with no neurotoxicities, but did have moderate chemotherapy-induced nausea. After discharge he had significant mucositis, poor PO intake, nausea, and lyte derangements. Given adverse side effects, Cycle 2 was delayed by 1 week and doxil was dose reduced to 70mg (previously given 80mg, ie 45mg/m2), Ifosfamide reduced by 10% (1500mg/m2 ? 1350mg/m2).   - Tolerated Cycle 2 Doxil/Ifos (D1=12/1/21) better, so plan to proceed with same dosing with cycle 3.     INTERVAL HISTORY:  No pain, fatigue depression or anxiety.  He does have a new complaint of a left eye stye without pain or drainage.     REVIEW OF SYSTEMS:  The remainder of a 10 point review of systems was negative.    PHYSICAL EXAM:  BP (!) 148/86 (BP Location: Right arm)   Pulse 84   Temp (!) 96.2  F (35.7  C) (Oral)   Resp 20   Ht 1.702 m (5' 7\")   Wt 68.3 kg (150 lb 9.2 oz)   SpO2 98%   BMI 23.58 kg/m    HEENT:  Slight hoarseness.  Left lower eyelid stye.   No cervical, supraclavicular, subclavicular or axillary lymphadenopathy.   Lungs:  Distant breath sounds, but clear.   Heart:  RRR  S1S2 without murmurs  Abdomen soft and non-tender.   Back without palpable masses.  Extremities 1+ ankle edema bilaterally.  Mood and affect normal.     LABS:  Reviewed. WBC 17.2, Hb 10.2, Plt 317 K.      ASSESSMENT AND PLAN:  1.   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 Treated with Keytruda with response and then progression.  Now admitted for third cycle of Doxil/ifosfamide.  PICC line placed this evening.      2.  Treatment Plan: Doxil + ifosfamide (C3D1=1/4/22).   Doxil 70mg IV once - D1    Ifosfamide/Mesna 1350 mg/m2 (2510 mg) CIVI - D1-6    Mesna 1350 mg/m2 (2510 mg) IV over 18 hrs - starting D7    Pre-meds: Emend 150 mg once Day 1 and Day 6; Zofran 8 mg Q8H    Neulasta injection - D8, schedule at [facility]  3.  Ophthalmology consult for left eye stye.     Thank you for allowing us to participate in this patient's care.  The patient was seen and evaluated by me.  I discussed the patient with the MARLEEN and agree with the findings and plan in the note, which was edited by me.    Sincerely,     Vishal Quan MD  Professor  [facility]  [phone].        I spent 20 minutes with the patient more than 50% of which was in counseling and coordination of care.

## 2022-01-03 NOTE — LETTER
1/3/2022         RE: Ifrah Huitron  [address] [place] [address]        Dear Colleague,    Thank you for referring your patient, Ifrah Huitron, to the [facility]. Please see a copy of my visit note below.    PCP:  Sukhdev Kohler    Chief complaint: Malignant mesothelioma undergoing chemotherapy, need for venous access    History of Present Illness: Yadiel is a 71-year-old man undergoing chemotherapy for cancer.  The specifics of the disease are not important other than he needs venous access for chemotherapy.  Up until now he has been admitted to the hospital and PICC line has been placed.  He spends 1 week in the hospital and then is discharged.  His PICC line has always been removed up until now.  He is requesting now that he get a port to make this part easier.  His peripheral veins are pretty much destroyed from previous treatments.    He is right-handed.  He is an avid golfer.  No history of clavicle fractures or rib fractures.  No history of blood clots in the deep venous system or superficial venous system of his arms.  He is not on a blood thinner.  He does not take aspirin.    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     Thyroid disease     removed pituitary gland       Past Surgical History:   Procedure Laterality Date     COLONOSCOPY N/A 12/17/2020    Procedure: COLONOSCOPY;  Surgeon: Ken Camacho MD;  Location: [facility]     ENT SURGERY       HERNIA REPAIR       LARYNGOSCOPY, EXCISE VOCAL CORD LESION MICROSCOPIC, COMBINED Left 07/01/2021    Procedure: MICROLARYNGOSCOPY, LEFT TRUE VOCAL CORD INJECTION WITH PROLARYN;  Surgeon: Kyree Bearden MD;  Location: [facility] OR     PHACOEMULSIFICATION WITH STANDARD INTRAOCULAR LENS IMPLANT Right 03/10/2021    Procedure: Cataract removal with implant.;  Surgeon: Jamir Mac MD;  Location: [facility] OR      "PHACOEMULSIFICATION WITH STANDARD INTRAOCULAR LENS IMPLANT Left 04/05/2021    Procedure: Cataract removal with implant.;  Surgeon: Jamir Mac MD;  Location: [facility] OR     PICC DOUBLE LUMEN PLACEMENT Right 12/01/2021    5FR DL PICC, basilic vein. L-38cm, 1cm out.     PITUITARY EXCISION       tooth pulled 4/7  Right        Family History   Problem Relation Age of Onset     Lupus Mother      ALS Father      Rheumatoid Arthritis Sister        Social History     Tobacco Use     Smoking status: Never Smoker     Smokeless tobacco: Never Used   Substance Use Topics     Alcohol use: Yes     Comment: rare       Current Outpatient Medications   Medication Sig Dispense Refill     acetaminophen (TYLENOL) 500 MG tablet Take 500-1,000 mg by mouth every 6 hours as needed for mild pain       calcium carbonate (TUMS) 500 MG chewable tablet Take 1 tablet (500 mg) by mouth 3 times daily as needed for heartburn       celecoxib (CELEBREX) 200 MG capsule Take 1 capsule (200 mg) by mouth 2 times daily. Note this is a new a strength! Only one capsule at a time! 60 capsule 3     cholecalciferol (VITAMIN D-1000 MAX ST) 1000 units TABS Take 1,000 Units by mouth daily        doxepin (SINEQUAN) 10 MG/ML (HIGH CONC) solution Take 2 mLs (20 mg) by mouth every 4 hours as needed for other (mouth pain) . Mix with equal parts tap water. Swish and hold in mouth ~60 seconds then spit out. 118 mL 3     guaiFENesin-codeine (GUAIFENESIN AC) 100-10 MG/5ML syrup Take 10 mLs by mouth every 4 hours as needed for cough 118 mL 0     hydrocortisone (CORTEF) 10 MG tablet Take 20 mg in the morning and 10 mg in the afternoon       Insulin Syringe-Needle U-100 27.5G X 5/8\" 2 ML MISC 1 each daily as needed (with solucortef for adrenal crisis) 10 each 1     levothyroxine (SYNTHROID/LEVOTHROID) 75 MCG tablet Take 75 mcg by mouth every morning        LORazepam (ATIVAN) 0.5 MG tablet Take 1 tablet (0.5 mg) by mouth every 4 hours as needed for nausea or vomiting " . Caution: causes sedation. 30 tablet 0     Menthol-Methyl Salicylate (DALIA MALIK GREASELESS) cream Apply topically every 6 hours as needed       metroNIDAZOLE (METROGEL) 0.75 % external gel Apply topically 2 times daily 45 g 11     nystatin (MYCOSTATIN) 945539 UNIT/ML suspension Take 5 mLs (500,000 Units) by mouth 4 times daily . Start at first signs of white coating on tongue. 473 mL 0     OLANZapine (ZYPREXA) 5 MG tablet Take 1 tablet (5 mg) by mouth At Bedtime Continue until your nausea resolves 30 tablet 1     omeprazole (PRILOSEC) 20 MG DR capsule Take 2 capsules (40 mg) by mouth daily 60 capsule 1     ondansetron (ZOFRAN) 4 MG tablet Take 1-2 tablets (4-8 mg) by mouth every 8 hours as needed for nausea 40 tablet 1     phosphorus tablet 250 mg (PHOSPHA 250 NEUTRAL) 250 MG per tablet Take 2 tablets (500 mg) by mouth 2 times daily 60 tablet 1     potassium chloride ER (K-TAB) 20 MEQ CR tablet Take 1 tablet (20 mEq) by mouth daily 30 tablet 1     prochlorperazine (COMPAZINE) 5 MG tablet Take 1 tablet (5 mg) by mouth every 6 hours as needed for nausea or vomiting . Caution: causes sedation. 30 tablet 1     triamcinolone (KENALOG) 0.1 % external cream Apply topically 2 times daily          Allergies   Allergen Reactions     Penicillins Hives and Swelling              Images:  No results found for this or any previous visit (from the past 744 hour(s)).    Labs:  No results found for any visits on 01/03/22.    ROS:  Problem focused review of systems is negative except as above  BP (!) 152/83 (BP Location: Right arm, Patient Position: Chair, Cuff Size: Adult Regular)   Pulse 87   Resp 18   SpO2 99%     Exam:  General - Alert and Oriented X4, NAD, well nourished  HEENT - Normocephalic, atraumatic  Neck - supple  Lungs -respirations unlabored, lungs are clear  CV - Heart RRR  Abdomen - Soft, non-tender  Neuro -grossly intact  Extremities - No cyanosis, clubbing or edema.      Assessment and Plan: Patient presents for  placement of a venous access port.  He is a good candidate for this procedure.  He is very happy to learn that blood can be drawn from this device.  I spent about 15 minutes with him discussing the anatomy and the technical aspects of placing the device.  Risks benefits and alternatives were discussed.  Specifically we discussed the risk of bleeding, infection, and pneumothorax.  We will attempt to get this on the schedule Wednesday.  He does not need a preop.  Covid testing will be done today.        Vishal Echevarria MD FACS              Again, thank you for allowing me to participate in the care of your patient.        Sincerely,        Vishal Echevarria MD

## 2022-01-04 ENCOUNTER — HOSPITAL ENCOUNTER (INPATIENT)
Facility: CLINIC | Age: 72
LOS: 7 days | Discharge: HOME | DRG: 847 | End: 2022-01-11
Attending: INTERNAL MEDICINE | Admitting: INTERNAL MEDICINE
Payer: MEDICARE

## 2022-01-04 DIAGNOSIS — E23.0 HYPOPITUITARISM (H): Primary | ICD-10-CM

## 2022-01-04 DIAGNOSIS — T45.1X5A CHEMOTHERAPY-INDUCED NEUTROPENIA (H): ICD-10-CM

## 2022-01-04 DIAGNOSIS — D70.1 CHEMOTHERAPY-INDUCED NEUTROPENIA (H): ICD-10-CM

## 2022-01-04 DIAGNOSIS — C49.9 SPINDLE CELL SARCOMA (H): Primary | ICD-10-CM

## 2022-01-04 DIAGNOSIS — C45.9 MESOTHELIOMA, MALIGNANT (H): ICD-10-CM

## 2022-01-04 DIAGNOSIS — B37.0 THRUSH: ICD-10-CM

## 2022-01-04 LAB
ALBUMIN SERPL-MCNC: 3.1 G/DL (ref 3.4–5)
ALBUMIN SERPL-MCNC: 3.2 G/DL (ref 3.4–5)
ALP SERPL-CCNC: 194 U/L (ref 40–150)
ALP SERPL-CCNC: 195 U/L (ref 40–150)
ALT SERPL W P-5'-P-CCNC: 29 U/L (ref 0–70)
ALT SERPL W P-5'-P-CCNC: 29 U/L (ref 0–70)
ANION GAP SERPL CALCULATED.3IONS-SCNC: 6 MMOL/L (ref 3–14)
ANION GAP SERPL CALCULATED.3IONS-SCNC: 7 MMOL/L (ref 3–14)
APTT PPP: 36 SECONDS (ref 22–38)
AST SERPL W P-5'-P-CCNC: 20 U/L (ref 0–45)
AST SERPL W P-5'-P-CCNC: 21 U/L (ref 0–45)
BASOPHILS # BLD AUTO: 0.1 10E3/UL (ref 0–0.2)
BASOPHILS # BLD AUTO: 0.1 10E3/UL (ref 0–0.2)
BASOPHILS NFR BLD AUTO: 1 %
BASOPHILS NFR BLD AUTO: 1 %
BILIRUB SERPL-MCNC: 0.3 MG/DL (ref 0.2–1.3)
BILIRUB SERPL-MCNC: 0.3 MG/DL (ref 0.2–1.3)
BUN SERPL-MCNC: 16 MG/DL (ref 7–30)
BUN SERPL-MCNC: 16 MG/DL (ref 7–30)
CALCIUM SERPL-MCNC: 8.7 MG/DL (ref 8.5–10.1)
CALCIUM SERPL-MCNC: 8.9 MG/DL (ref 8.5–10.1)
CHLORIDE BLD-SCNC: 107 MMOL/L (ref 94–109)
CHLORIDE BLD-SCNC: 109 MMOL/L (ref 94–109)
CO2 SERPL-SCNC: 27 MMOL/L (ref 20–32)
CO2 SERPL-SCNC: 27 MMOL/L (ref 20–32)
CREAT SERPL-MCNC: 0.71 MG/DL (ref 0.66–1.25)
CREAT SERPL-MCNC: 0.79 MG/DL (ref 0.66–1.25)
EOSINOPHIL # BLD AUTO: 0 10E3/UL (ref 0–0.7)
EOSINOPHIL # BLD AUTO: 0.1 10E3/UL (ref 0–0.7)
EOSINOPHIL NFR BLD AUTO: 0 %
EOSINOPHIL NFR BLD AUTO: 0 %
ERYTHROCYTE [DISTWIDTH] IN BLOOD BY AUTOMATED COUNT: 19 % (ref 10–15)
ERYTHROCYTE [DISTWIDTH] IN BLOOD BY AUTOMATED COUNT: 19.1 % (ref 10–15)
FIBRINOGEN PPP-MCNC: 410 MG/DL (ref 170–490)
GFR SERPL CREATININE-BSD FRML MDRD: >90 ML/MIN/1.73M2
GFR SERPL CREATININE-BSD FRML MDRD: >90 ML/MIN/1.73M2
GLUCOSE BLD-MCNC: 140 MG/DL (ref 70–99)
GLUCOSE BLD-MCNC: 202 MG/DL (ref 70–99)
HCT VFR BLD AUTO: 31.8 % (ref 40–53)
HCT VFR BLD AUTO: 32.7 % (ref 40–53)
HGB BLD-MCNC: 10.2 G/DL (ref 13.3–17.7)
HGB BLD-MCNC: 9.8 G/DL (ref 13.3–17.7)
IMM GRANULOCYTES # BLD: 0.4 10E3/UL
IMM GRANULOCYTES # BLD: 0.4 10E3/UL
IMM GRANULOCYTES NFR BLD: 3 %
IMM GRANULOCYTES NFR BLD: 3 %
INR PPP: 1.12 (ref 0.85–1.15)
LDH SERPL L TO P-CCNC: 230 U/L (ref 85–227)
LYMPHOCYTES # BLD AUTO: 1 10E3/UL (ref 0.8–5.3)
LYMPHOCYTES # BLD AUTO: 1.2 10E3/UL (ref 0.8–5.3)
LYMPHOCYTES NFR BLD AUTO: 10 %
LYMPHOCYTES NFR BLD AUTO: 9 %
MAGNESIUM SERPL-MCNC: 2.2 MG/DL (ref 1.6–2.3)
MCH RBC QN AUTO: 28.6 PG (ref 26.5–33)
MCH RBC QN AUTO: 28.7 PG (ref 26.5–33)
MCHC RBC AUTO-ENTMCNC: 30.8 G/DL (ref 31.5–36.5)
MCHC RBC AUTO-ENTMCNC: 31.2 G/DL (ref 31.5–36.5)
MCV RBC AUTO: 92 FL (ref 78–100)
MCV RBC AUTO: 93 FL (ref 78–100)
MONOCYTES # BLD AUTO: 1 10E3/UL (ref 0–1.3)
MONOCYTES # BLD AUTO: 1.5 10E3/UL (ref 0–1.3)
MONOCYTES NFR BLD AUTO: 12 %
MONOCYTES NFR BLD AUTO: 8 %
NEUTROPHILS # BLD AUTO: 9 10E3/UL (ref 1.6–8.3)
NEUTROPHILS # BLD AUTO: 9.5 10E3/UL (ref 1.6–8.3)
NEUTROPHILS NFR BLD AUTO: 74 %
NEUTROPHILS NFR BLD AUTO: 79 %
NRBC # BLD AUTO: 0 10E3/UL
NRBC # BLD AUTO: 0 10E3/UL
NRBC BLD AUTO-RTO: 0 /100
NRBC BLD AUTO-RTO: 0 /100
PHOSPHATE SERPL-MCNC: 2.7 MG/DL (ref 2.5–4.5)
PLATELET # BLD AUTO: 301 10E3/UL (ref 150–450)
PLATELET # BLD AUTO: 317 10E3/UL (ref 150–450)
POTASSIUM BLD-SCNC: 3.5 MMOL/L (ref 3.4–5.3)
POTASSIUM BLD-SCNC: 3.8 MMOL/L (ref 3.4–5.3)
PROT SERPL-MCNC: 6.7 G/DL (ref 6.8–8.8)
PROT SERPL-MCNC: 6.8 G/DL (ref 6.8–8.8)
RBC # BLD AUTO: 3.43 10E6/UL (ref 4.4–5.9)
RBC # BLD AUTO: 3.55 10E6/UL (ref 4.4–5.9)
SODIUM SERPL-SCNC: 140 MMOL/L (ref 133–144)
SODIUM SERPL-SCNC: 143 MMOL/L (ref 133–144)
WBC # BLD AUTO: 12 10E3/UL (ref 4–11)
WBC # BLD AUTO: 12.2 10E3/UL (ref 4–11)

## 2022-01-04 PROCEDURE — 120N000002 HC R&B MED SURG/OB UMMC

## 2022-01-04 PROCEDURE — 3E04305 INTRODUCTION OF OTHER ANTINEOPLASTIC INTO CENTRAL VEIN, PERCUTANEOUS APPROACH: ICD-10-PCS | Performed by: INTERNAL MEDICINE

## 2022-01-04 PROCEDURE — 85730 THROMBOPLASTIN TIME PARTIAL: CPT | Performed by: PHYSICIAN ASSISTANT

## 2022-01-04 PROCEDURE — 36592 COLLECT BLOOD FROM PICC: CPT | Performed by: PHYSICIAN ASSISTANT

## 2022-01-04 PROCEDURE — 85384 FIBRINOGEN ACTIVITY: CPT | Performed by: PHYSICIAN ASSISTANT

## 2022-01-04 PROCEDURE — 83615 LACTATE (LD) (LDH) ENZYME: CPT | Performed by: PHYSICIAN ASSISTANT

## 2022-01-04 PROCEDURE — 250N000011 HC RX IP 250 OP 636: Performed by: INTERNAL MEDICINE

## 2022-01-04 PROCEDURE — 84155 ASSAY OF PROTEIN SERUM: CPT | Performed by: PHYSICIAN ASSISTANT

## 2022-01-04 PROCEDURE — 272N000473 HC KIT, VPS RHYTHM STYLET

## 2022-01-04 PROCEDURE — 250N000011 HC RX IP 250 OP 636: Performed by: PHYSICIAN ASSISTANT

## 2022-01-04 PROCEDURE — 85025 COMPLETE CBC W/AUTO DIFF WBC: CPT | Performed by: PHYSICIAN ASSISTANT

## 2022-01-04 PROCEDURE — 36569 INSJ PICC 5 YR+ W/O IMAGING: CPT

## 2022-01-04 PROCEDURE — 83735 ASSAY OF MAGNESIUM: CPT | Performed by: PHYSICIAN ASSISTANT

## 2022-01-04 PROCEDURE — 85610 PROTHROMBIN TIME: CPT | Performed by: PHYSICIAN ASSISTANT

## 2022-01-04 PROCEDURE — 258N000003 HC RX IP 258 OP 636: Performed by: PHYSICIAN ASSISTANT

## 2022-01-04 PROCEDURE — 272N000201 ZZ HC ADHESIVE SKIN CLOSURE, DERMABOND

## 2022-01-04 PROCEDURE — 84450 TRANSFERASE (AST) (SGOT): CPT | Performed by: PHYSICIAN ASSISTANT

## 2022-01-04 PROCEDURE — 272N000451 HC KIT SHRLOCK 5FR POWER PICC DOUBLE LUMEN

## 2022-01-04 PROCEDURE — 84100 ASSAY OF PHOSPHORUS: CPT | Performed by: PHYSICIAN ASSISTANT

## 2022-01-04 PROCEDURE — 36415 COLL VENOUS BLD VENIPUNCTURE: CPT | Performed by: PHYSICIAN ASSISTANT

## 2022-01-04 PROCEDURE — 99223 1ST HOSP IP/OBS HIGH 75: CPT | Mod: AI | Performed by: INTERNAL MEDICINE

## 2022-01-04 PROCEDURE — 250N000013 HC RX MED GY IP 250 OP 250 PS 637: Performed by: PHYSICIAN ASSISTANT

## 2022-01-04 RX ORDER — MENTHOL 1.4 %
ADHESIVE PATCH, MEDICATED TOPICAL EVERY 6 HOURS PRN
Status: DISCONTINUED | OUTPATIENT
Start: 2022-01-04 | End: 2022-01-04

## 2022-01-04 RX ORDER — EPINEPHRINE 1 MG/ML
0.3 INJECTION, SOLUTION, CONCENTRATE INTRAVENOUS EVERY 5 MIN PRN
Status: DISCONTINUED | OUTPATIENT
Start: 2022-01-04 | End: 2022-01-11 | Stop reason: HOSPADM

## 2022-01-04 RX ORDER — DIPHENHYDRAMINE HYDROCHLORIDE AND LIDOCAINE HYDROCHLORIDE AND ALUMINUM HYDROXIDE AND MAGNESIUM HYDRO
10 KIT EVERY 6 HOURS PRN
Status: DISCONTINUED | OUTPATIENT
Start: 2022-01-04 | End: 2022-01-11 | Stop reason: HOSPADM

## 2022-01-04 RX ORDER — ACETAMINOPHEN 325 MG/1
650 TABLET ORAL EVERY 4 HOURS PRN
Status: DISCONTINUED | OUTPATIENT
Start: 2022-01-04 | End: 2022-01-11 | Stop reason: HOSPADM

## 2022-01-04 RX ORDER — HYDROCORTISONE 10 MG/1
TABLET ORAL
Qty: 270 TABLET | Refills: 3 | Status: SHIPPED | OUTPATIENT
Start: 2022-01-04 | End: 2023-01-01

## 2022-01-04 RX ORDER — POTASSIUM CHLORIDE 750 MG/1
20 TABLET, EXTENDED RELEASE ORAL DAILY
Status: DISCONTINUED | OUTPATIENT
Start: 2022-01-04 | End: 2022-01-11 | Stop reason: HOSPADM

## 2022-01-04 RX ORDER — LORAZEPAM 2 MG/ML
.5-1 INJECTION INTRAMUSCULAR EVERY 6 HOURS PRN
Status: DISCONTINUED | OUTPATIENT
Start: 2022-01-04 | End: 2022-01-11 | Stop reason: HOSPADM

## 2022-01-04 RX ORDER — CELECOXIB 200 MG/1
200 CAPSULE ORAL 2 TIMES DAILY
Status: DISCONTINUED | OUTPATIENT
Start: 2022-01-04 | End: 2022-01-11 | Stop reason: HOSPADM

## 2022-01-04 RX ORDER — HYDROCORTISONE 10 MG/1
20 TABLET ORAL EVERY MORNING
Status: DISCONTINUED | OUTPATIENT
Start: 2022-01-05 | End: 2022-01-11 | Stop reason: HOSPADM

## 2022-01-04 RX ORDER — ONDANSETRON 2 MG/ML
8 INJECTION INTRAMUSCULAR; INTRAVENOUS EVERY 8 HOURS
Status: DISCONTINUED | OUTPATIENT
Start: 2022-01-04 | End: 2022-01-04

## 2022-01-04 RX ORDER — ONDANSETRON 4 MG/1
8 TABLET, ORALLY DISINTEGRATING ORAL EVERY 8 HOURS
Status: DISCONTINUED | OUTPATIENT
Start: 2022-01-04 | End: 2022-01-04

## 2022-01-04 RX ORDER — MEPERIDINE HYDROCHLORIDE 25 MG/ML
25 INJECTION INTRAMUSCULAR; INTRAVENOUS; SUBCUTANEOUS EVERY 30 MIN PRN
Status: DISCONTINUED | OUTPATIENT
Start: 2022-01-04 | End: 2022-01-11 | Stop reason: HOSPADM

## 2022-01-04 RX ORDER — METHYLPREDNISOLONE SODIUM SUCCINATE 125 MG/2ML
125 INJECTION, POWDER, LYOPHILIZED, FOR SOLUTION INTRAMUSCULAR; INTRAVENOUS
Status: DISCONTINUED | OUTPATIENT
Start: 2022-01-04 | End: 2022-01-04

## 2022-01-04 RX ORDER — POTASSIUM CHLORIDE 1500 MG/1
20 TABLET, EXTENDED RELEASE ORAL DAILY
Status: DISCONTINUED | OUTPATIENT
Start: 2022-01-04 | End: 2022-01-04

## 2022-01-04 RX ORDER — ONDANSETRON 8 MG/1
8 TABLET, FILM COATED ORAL EVERY 8 HOURS
Status: DISCONTINUED | OUTPATIENT
Start: 2022-01-04 | End: 2022-01-04

## 2022-01-04 RX ORDER — VITAMIN B COMPLEX
25 TABLET ORAL DAILY
Status: DISCONTINUED | OUTPATIENT
Start: 2022-01-04 | End: 2022-01-11 | Stop reason: HOSPADM

## 2022-01-04 RX ORDER — HYDROCORTISONE 10 MG/1
10 TABLET ORAL DAILY
Status: DISCONTINUED | OUTPATIENT
Start: 2022-01-04 | End: 2022-01-11 | Stop reason: HOSPADM

## 2022-01-04 RX ORDER — HEPARIN SODIUM,PORCINE 10 UNIT/ML
5-20 VIAL (ML) INTRAVENOUS EVERY 24 HOURS
Status: DISCONTINUED | OUTPATIENT
Start: 2022-01-04 | End: 2022-01-11 | Stop reason: HOSPADM

## 2022-01-04 RX ORDER — OLANZAPINE 5 MG/1
5 TABLET ORAL AT BEDTIME
Status: DISCONTINUED | OUTPATIENT
Start: 2022-01-04 | End: 2022-01-11 | Stop reason: HOSPADM

## 2022-01-04 RX ORDER — LEVOTHYROXINE SODIUM 75 UG/1
75 TABLET ORAL EVERY MORNING
Qty: 90 TABLET | Refills: 3 | Status: SHIPPED | OUTPATIENT
Start: 2022-01-04 | End: 2023-01-01

## 2022-01-04 RX ORDER — DEXTROSE MONOHYDRATE 50 MG/ML
10-20 INJECTION, SOLUTION INTRAVENOUS
Status: DISCONTINUED | OUTPATIENT
Start: 2022-01-11 | End: 2022-01-11 | Stop reason: HOSPADM

## 2022-01-04 RX ORDER — HEPARIN SODIUM,PORCINE 10 UNIT/ML
5-20 VIAL (ML) INTRAVENOUS
Status: DISCONTINUED | OUTPATIENT
Start: 2022-01-04 | End: 2022-01-11 | Stop reason: HOSPADM

## 2022-01-04 RX ORDER — ONDANSETRON 8 MG/1
8 TABLET, FILM COATED ORAL EVERY 8 HOURS
Status: COMPLETED | OUTPATIENT
Start: 2022-01-04 | End: 2022-01-11

## 2022-01-04 RX ORDER — LORAZEPAM 0.5 MG/1
.5-1 TABLET ORAL EVERY 6 HOURS PRN
Status: DISCONTINUED | OUTPATIENT
Start: 2022-01-04 | End: 2022-01-11 | Stop reason: HOSPADM

## 2022-01-04 RX ORDER — TRIAMCINOLONE ACETONIDE 1 MG/G
CREAM TOPICAL 2 TIMES DAILY PRN
Status: DISCONTINUED | OUTPATIENT
Start: 2022-01-04 | End: 2022-01-11 | Stop reason: HOSPADM

## 2022-01-04 RX ORDER — DOXEPIN HYDROCHLORIDE 10 MG/ML
20 SOLUTION ORAL EVERY 4 HOURS PRN
Status: DISCONTINUED | OUTPATIENT
Start: 2022-01-04 | End: 2022-01-11 | Stop reason: HOSPADM

## 2022-01-04 RX ORDER — PROCHLORPERAZINE MALEATE 5 MG
5 TABLET ORAL EVERY 6 HOURS PRN
Status: DISCONTINUED | OUTPATIENT
Start: 2022-01-04 | End: 2022-01-11 | Stop reason: HOSPADM

## 2022-01-04 RX ORDER — LIDOCAINE 40 MG/G
CREAM TOPICAL
Status: ACTIVE | OUTPATIENT
Start: 2022-01-04 | End: 2022-01-07

## 2022-01-04 RX ORDER — PROCHLORPERAZINE MALEATE 5 MG
5 TABLET ORAL EVERY 6 HOURS PRN
Status: DISCONTINUED | OUTPATIENT
Start: 2022-01-04 | End: 2022-01-04

## 2022-01-04 RX ORDER — POLYETHYLENE GLYCOL 3350 17 G/17G
17 POWDER, FOR SOLUTION ORAL DAILY PRN
Status: DISCONTINUED | OUTPATIENT
Start: 2022-01-04 | End: 2022-01-11 | Stop reason: HOSPADM

## 2022-01-04 RX ORDER — NALOXONE HYDROCHLORIDE 0.4 MG/ML
0.2 INJECTION, SOLUTION INTRAMUSCULAR; INTRAVENOUS; SUBCUTANEOUS
Status: DISCONTINUED | OUTPATIENT
Start: 2022-01-04 | End: 2022-01-11 | Stop reason: HOSPADM

## 2022-01-04 RX ORDER — NYSTATIN 100000/ML
1000000 SUSPENSION, ORAL (FINAL DOSE FORM) ORAL 4 TIMES DAILY
Status: DISCONTINUED | OUTPATIENT
Start: 2022-01-04 | End: 2022-01-11

## 2022-01-04 RX ORDER — AMOXICILLIN 250 MG
1 CAPSULE ORAL 2 TIMES DAILY PRN
Status: DISCONTINUED | OUTPATIENT
Start: 2022-01-04 | End: 2022-01-11 | Stop reason: HOSPADM

## 2022-01-04 RX ORDER — AMOXICILLIN 250 MG
2 CAPSULE ORAL 2 TIMES DAILY PRN
Status: DISCONTINUED | OUTPATIENT
Start: 2022-01-04 | End: 2022-01-11 | Stop reason: HOSPADM

## 2022-01-04 RX ORDER — ALBUTEROL SULFATE 0.83 MG/ML
2.5 SOLUTION RESPIRATORY (INHALATION)
Status: DISCONTINUED | OUTPATIENT
Start: 2022-01-04 | End: 2022-01-11 | Stop reason: HOSPADM

## 2022-01-04 RX ORDER — NYSTATIN 100000/ML
500000 SUSPENSION, ORAL (FINAL DOSE FORM) ORAL 4 TIMES DAILY
Status: DISCONTINUED | OUTPATIENT
Start: 2022-01-04 | End: 2022-01-04

## 2022-01-04 RX ORDER — CALCIUM CARBONATE 500 MG/1
500 TABLET, CHEWABLE ORAL 3 TIMES DAILY PRN
Status: DISCONTINUED | OUTPATIENT
Start: 2022-01-04 | End: 2022-01-11 | Stop reason: HOSPADM

## 2022-01-04 RX ORDER — DIPHENHYDRAMINE HYDROCHLORIDE 50 MG/ML
50 INJECTION INTRAMUSCULAR; INTRAVENOUS
Status: DISCONTINUED | OUTPATIENT
Start: 2022-01-04 | End: 2022-01-11 | Stop reason: HOSPADM

## 2022-01-04 RX ORDER — ALBUTEROL SULFATE 90 UG/1
1-2 AEROSOL, METERED RESPIRATORY (INHALATION)
Status: DISCONTINUED | OUTPATIENT
Start: 2022-01-04 | End: 2022-01-11 | Stop reason: HOSPADM

## 2022-01-04 RX ADMIN — HYDROCORTISONE 10 MG: 10 TABLET ORAL at 17:06

## 2022-01-04 RX ADMIN — NYSTATIN 1000000 UNITS: 100000 SUSPENSION ORAL at 21:22

## 2022-01-04 RX ADMIN — NYSTATIN 1000000 UNITS: 100000 SUSPENSION ORAL at 17:06

## 2022-01-04 RX ADMIN — DOXORUBICIN HYDROCHLORIDE 70 MG: 2 INJECTABLE, LIPOSOMAL INTRAVENOUS at 22:53

## 2022-01-04 RX ADMIN — Medication 5 ML: at 21:50

## 2022-01-04 RX ADMIN — DIBASIC SODIUM PHOSPHATE, MONOBASIC POTASSIUM PHOSPHATE AND MONOBASIC SODIUM PHOSPHATE 500 MG: 852; 155; 130 TABLET ORAL at 21:17

## 2022-01-04 RX ADMIN — CELECOXIB 200 MG: 200 CAPSULE ORAL at 21:17

## 2022-01-04 RX ADMIN — Medication 5 ML: at 19:28

## 2022-01-04 RX ADMIN — ONDANSETRON HYDROCHLORIDE 8 MG: 8 TABLET, FILM COATED ORAL at 21:50

## 2022-01-04 RX ADMIN — FOSAPREPITANT 150 MG: 150 INJECTION, POWDER, LYOPHILIZED, FOR SOLUTION INTRAVENOUS at 21:49

## 2022-01-04 RX ADMIN — ENOXAPARIN SODIUM 40 MG: 40 INJECTION SUBCUTANEOUS at 21:22

## 2022-01-04 ASSESSMENT — ACTIVITIES OF DAILY LIVING (ADL)
ADLS_ACUITY_SCORE: 6
ADLS_ACUITY_SCORE: 12

## 2022-01-04 ASSESSMENT — MIFFLIN-ST. JEOR: SCORE: 1386.63

## 2022-01-04 NOTE — PROVIDER NOTIFICATION
DATE/TIME  (DOT-TD, DOT-NOW) CHEMO CHECK ACTIVITY (REGIMEN & DOSE CHECK, DAY, DOSE #, NAME OF CHEMO #1)  CHEMO DRUG #2  CHEMO DRUG #3 NAME OF RN #1 (USE DOT-ME HERE) NAME OF RN#2 (2ND RN TO LOG IN SEPARATELY)   1/4/2022  5:50 PM Chemo Protocol Double Check Doxil/Ifos/Mesna   PETER Feliciano RN     1/4/2022  10:18 PM Doxorubicin Dose #1 ifosfamide Dose #1  PETER Feliciano RN     1/5/2022  9:32 PM   Ifos/Mesna Dose #2   PETER Feliciano RN     1/6/2022  11:14 PM   Ifos/Mesna Dose #3   PETER Feliciano RN     01/07/22  8:51 PM   Ifos/ Mesna Dose #4   PETER Sanders RN    01/08/22  9:27 PM   Ifos/ Mesna Dose #5   Gianna Stallings RN   ([place])   01/09/22  7:55 PM   Ifos/ Mesna Dose #6   Gianna Stallings RN

## 2022-01-04 NOTE — TELEPHONE ENCOUNTER
Requested Prescriptions   Pending Prescriptions Disp Refills     hydrocortisone (CORTEF) 10 MG tablet       Sig: Take 20 mg in the morning and 10 mg in the afternoon       There is no refill protocol information for this order        levothyroxine (SYNTHROID/LEVOTHROID) 75 MCG tablet       Sig: Take 1 tablet (75 mcg) by mouth every morning       There is no refill protocol information for this order        Last office visit: 11/11/2021 with prescribing provider:  Dr. Grant    Future Office Visit:          Arcenio Weiss  [facility]

## 2022-01-05 ENCOUNTER — TELEPHONE (OUTPATIENT)
Dept: SURGERY | Facility: CLINIC | Age: 72
End: 2022-01-05
Payer: MEDICARE

## 2022-01-05 ENCOUNTER — PATIENT OUTREACH (OUTPATIENT)
Dept: CARE COORDINATION | Facility: CLINIC | Age: 72
End: 2022-01-05
Payer: MEDICARE

## 2022-01-05 DIAGNOSIS — C45.9 MESOTHELIOMA, MALIGNANT (H): Primary | ICD-10-CM

## 2022-01-05 LAB
ALBUMIN SERPL-MCNC: 2.5 G/DL (ref 3.4–5)
ALP SERPL-CCNC: 165 U/L (ref 40–150)
ALT SERPL W P-5'-P-CCNC: 23 U/L (ref 0–70)
ANION GAP SERPL CALCULATED.3IONS-SCNC: 5 MMOL/L (ref 3–14)
AST SERPL W P-5'-P-CCNC: 17 U/L (ref 0–45)
BASOPHILS # BLD AUTO: 0.1 10E3/UL (ref 0–0.2)
BASOPHILS NFR BLD AUTO: 1 %
BILIRUB SERPL-MCNC: 0.3 MG/DL (ref 0.2–1.3)
BUN SERPL-MCNC: 14 MG/DL (ref 7–30)
CALCIUM SERPL-MCNC: 8.7 MG/DL (ref 8.5–10.1)
CHLORIDE BLD-SCNC: 109 MMOL/L (ref 94–109)
CO2 SERPL-SCNC: 29 MMOL/L (ref 20–32)
CREAT SERPL-MCNC: 0.71 MG/DL (ref 0.66–1.25)
EOSINOPHIL # BLD AUTO: 0.1 10E3/UL (ref 0–0.7)
EOSINOPHIL NFR BLD AUTO: 1 %
ERYTHROCYTE [DISTWIDTH] IN BLOOD BY AUTOMATED COUNT: 19.1 % (ref 10–15)
GFR SERPL CREATININE-BSD FRML MDRD: >90 ML/MIN/1.73M2
GLUCOSE BLD-MCNC: 126 MG/DL (ref 70–99)
HCT VFR BLD AUTO: 29.7 % (ref 40–53)
HGB BLD-MCNC: 9 G/DL (ref 13.3–17.7)
IMM GRANULOCYTES # BLD: 0.2 10E3/UL
IMM GRANULOCYTES NFR BLD: 3 %
LYMPHOCYTES # BLD AUTO: 1.5 10E3/UL (ref 0.8–5.3)
LYMPHOCYTES NFR BLD AUTO: 15 %
MAGNESIUM SERPL-MCNC: 2.3 MG/DL (ref 1.6–2.3)
MCH RBC QN AUTO: 28.6 PG (ref 26.5–33)
MCHC RBC AUTO-ENTMCNC: 30.3 G/DL (ref 31.5–36.5)
MCV RBC AUTO: 94 FL (ref 78–100)
MONOCYTES # BLD AUTO: 1.3 10E3/UL (ref 0–1.3)
MONOCYTES NFR BLD AUTO: 14 %
NEUTROPHILS # BLD AUTO: 6.7 10E3/UL (ref 1.6–8.3)
NEUTROPHILS NFR BLD AUTO: 66 %
NRBC # BLD AUTO: 0 10E3/UL
NRBC BLD AUTO-RTO: 0 /100
PHOSPHATE SERPL-MCNC: 3.2 MG/DL (ref 2.5–4.5)
PLATELET # BLD AUTO: 261 10E3/UL (ref 150–450)
POTASSIUM BLD-SCNC: 3.6 MMOL/L (ref 3.4–5.3)
PROT SERPL-MCNC: 5.9 G/DL (ref 6.8–8.8)
RBC # BLD AUTO: 3.15 10E6/UL (ref 4.4–5.9)
SODIUM SERPL-SCNC: 143 MMOL/L (ref 133–144)
WBC # BLD AUTO: 9.8 10E3/UL (ref 4–11)

## 2022-01-05 PROCEDURE — 250N000011 HC RX IP 250 OP 636: Performed by: PHYSICIAN ASSISTANT

## 2022-01-05 PROCEDURE — 258N000003 HC RX IP 258 OP 636: Performed by: PHYSICIAN ASSISTANT

## 2022-01-05 PROCEDURE — 83735 ASSAY OF MAGNESIUM: CPT | Performed by: PHYSICIAN ASSISTANT

## 2022-01-05 PROCEDURE — 250N000011 HC RX IP 250 OP 636: Performed by: INTERNAL MEDICINE

## 2022-01-05 PROCEDURE — 84100 ASSAY OF PHOSPHORUS: CPT | Performed by: PHYSICIAN ASSISTANT

## 2022-01-05 PROCEDURE — 99233 SBSQ HOSP IP/OBS HIGH 50: CPT | Mod: FS | Performed by: INTERNAL MEDICINE

## 2022-01-05 PROCEDURE — 250N000013 HC RX MED GY IP 250 OP 250 PS 637: Performed by: PHYSICIAN ASSISTANT

## 2022-01-05 PROCEDURE — 250N000013 HC RX MED GY IP 250 OP 250 PS 637: Performed by: INTERNAL MEDICINE

## 2022-01-05 PROCEDURE — 85025 COMPLETE CBC W/AUTO DIFF WBC: CPT | Performed by: PHYSICIAN ASSISTANT

## 2022-01-05 PROCEDURE — 120N000002 HC R&B MED SURG/OB UMMC

## 2022-01-05 PROCEDURE — 80053 COMPREHEN METABOLIC PANEL: CPT | Performed by: PHYSICIAN ASSISTANT

## 2022-01-05 PROCEDURE — 36592 COLLECT BLOOD FROM PICC: CPT | Performed by: PHYSICIAN ASSISTANT

## 2022-01-05 RX ORDER — METRONIDAZOLE 7.5 MG/G
GEL TOPICAL 2 TIMES DAILY
Status: DISCONTINUED | OUTPATIENT
Start: 2022-01-05 | End: 2022-01-11 | Stop reason: HOSPADM

## 2022-01-05 RX ORDER — PANTOPRAZOLE SODIUM 40 MG/1
40 TABLET, DELAYED RELEASE ORAL
Status: DISCONTINUED | OUTPATIENT
Start: 2022-01-06 | End: 2022-01-11 | Stop reason: HOSPADM

## 2022-01-05 RX ORDER — HYDRALAZINE HYDROCHLORIDE 10 MG/1
10 TABLET, FILM COATED ORAL 4 TIMES DAILY PRN
Status: DISCONTINUED | OUTPATIENT
Start: 2022-01-05 | End: 2022-01-11 | Stop reason: HOSPADM

## 2022-01-05 RX ADMIN — DIBASIC SODIUM PHOSPHATE, MONOBASIC POTASSIUM PHOSPHATE AND MONOBASIC SODIUM PHOSPHATE 500 MG: 852; 155; 130 TABLET ORAL at 10:34

## 2022-01-05 RX ADMIN — ONDANSETRON HYDROCHLORIDE 8 MG: 8 TABLET, FILM COATED ORAL at 14:07

## 2022-01-05 RX ADMIN — LEVOTHYROXINE SODIUM 75 MCG: 0.05 TABLET ORAL at 08:01

## 2022-01-05 RX ADMIN — Medication 5 ML: at 05:48

## 2022-01-05 RX ADMIN — ONDANSETRON HYDROCHLORIDE 8 MG: 8 TABLET, FILM COATED ORAL at 21:34

## 2022-01-05 RX ADMIN — METRONIDAZOLE: 7.5 GEL TOPICAL at 19:17

## 2022-01-05 RX ADMIN — ONDANSETRON HYDROCHLORIDE 8 MG: 8 TABLET, FILM COATED ORAL at 05:47

## 2022-01-05 RX ADMIN — OLANZAPINE 5 MG: 5 TABLET, FILM COATED ORAL at 01:32

## 2022-01-05 RX ADMIN — CHOLECALCIFEROL TAB 25 MCG (1000 UNIT) 25 MCG: 25 TAB at 10:34

## 2022-01-05 RX ADMIN — DIBASIC SODIUM PHOSPHATE, MONOBASIC POTASSIUM PHOSPHATE AND MONOBASIC SODIUM PHOSPHATE 500 MG: 852; 155; 130 TABLET ORAL at 19:17

## 2022-01-05 RX ADMIN — HYDROCORTISONE 10 MG: 10 TABLET ORAL at 16:38

## 2022-01-05 RX ADMIN — NYSTATIN 1000000 UNITS: 100000 SUSPENSION ORAL at 14:07

## 2022-01-05 RX ADMIN — POTASSIUM CHLORIDE 20 MEQ: 750 TABLET, EXTENDED RELEASE ORAL at 10:34

## 2022-01-05 RX ADMIN — MESNA 2510 MG: 100 INJECTION, SOLUTION INTRAVENOUS at 00:46

## 2022-01-05 RX ADMIN — MESNA 2510 MG: 100 INJECTION, SOLUTION INTRAVENOUS at 23:47

## 2022-01-05 RX ADMIN — CELECOXIB 200 MG: 200 CAPSULE ORAL at 10:34

## 2022-01-05 RX ADMIN — CELECOXIB 200 MG: 200 CAPSULE ORAL at 21:34

## 2022-01-05 RX ADMIN — METRONIDAZOLE: 7.5 GEL TOPICAL at 08:01

## 2022-01-05 RX ADMIN — HYDROCORTISONE 20 MG: 10 TABLET ORAL at 10:34

## 2022-01-05 RX ADMIN — OLANZAPINE 5 MG: 5 TABLET, FILM COATED ORAL at 21:34

## 2022-01-05 RX ADMIN — NYSTATIN 1000000 UNITS: 100000 SUSPENSION ORAL at 19:17

## 2022-01-05 RX ADMIN — ENOXAPARIN SODIUM 40 MG: 40 INJECTION SUBCUTANEOUS at 19:17

## 2022-01-05 RX ADMIN — NYSTATIN 1000000 UNITS: 100000 SUSPENSION ORAL at 16:38

## 2022-01-05 RX ADMIN — NYSTATIN 1000000 UNITS: 100000 SUSPENSION ORAL at 10:34

## 2022-01-05 RX ADMIN — OMEPRAZOLE 40 MG: 20 CAPSULE, DELAYED RELEASE ORAL at 10:34

## 2022-01-05 ASSESSMENT — ACTIVITIES OF DAILY LIVING (ADL)
ADLS_ACUITY_SCORE: 6
DEPENDENT_IADLS:: INDEPENDENT
ADLS_ACUITY_SCORE: 6

## 2022-01-05 ASSESSMENT — MIFFLIN-ST. JEOR: SCORE: 1386.29

## 2022-01-05 NOTE — TELEPHONE ENCOUNTER
Tried calling Ifrah to reschedule his surgery, but his phone just rings was unable to leave a message.    Lourdes Ryan MA

## 2022-01-05 NOTE — PROVIDER NOTIFICATION
"Provider paged.    \"Pt admitted 1/4/22 for chemo. Current /107, rechecked pressure 169/92. Has hx of mild hypertension w/ no sx, no interventions currently ordered.\"    Response: no new orders, update again if SBP >180 or if patient becomes symptomatic.   "

## 2022-01-05 NOTE — PROGRESS NOTES
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 Thrush noted on admission, continues on Nystatin. Antiemetics plan in place.     TODAY: D1 Doxil/Ifos  - Proceed with protocol-directed chemo, tolerating well thus far  - No nausea this AM. S/p D1 Emend. Continue scheduled Zofran and Zyprexa HS. PRN Compazine/Ativan.   - Continue Nystatin for thrush   - Lytes stable/wnl, follow daily. Continue PTA Phos and KCl supplements.       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 Wolff. Patient presented to his PCP in 03/2021 with chest/left shoulder and back pain that led to imaging which revealed multiple lung mets, included a left pleural mass. There were difficulties in getting diagnostic biopsy; eventually, biopsy of the mediastinal mass (5/20/21) revealed a poorly characterized malignant spindle cell neoplasm with high PD-L1 expression (90%), Ki-67 70%. 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 Insufficient material to send [facility]. Baseline imaging (6/21/21) revealed progression of lung mets and left-sided subdiaphragmatic mass, stable liver lesions. He was seen by ENT for hoarseness, which was attributed to left true vocal fold motion impairment from mediastinal mass. Given high PD-L1 expression, he was started on Keytruda (C1=6/21/21). Interval imaging (CT 8/2/21) revealed positive response to treatment. He received 6 cycles total, most recently C6=10/4/21. Unfortunately, restaging imaging (10/18/21) revealed interval  increase in size of the LUQ/splenic mass; however, the mediastinal and left pleural mass were stable to slightly decreased in size. He met with Dr. Wolff, who recommended a change in therapy to Doxil/ifosfamide. He was admitted on 10/26/21 to receive Cycle #1 of Doxil/ifos which he tolerated reasonably well with no neurotoxicities, but did have moderate chemotherapy-induced nausea. After discharge he had significant mucositis, poor PO intake, nausea, and lyte derangements. Given adverse side effects, Cycle 2 was delayed by 1 week and doxil was dose reduced to 70mg (previously given 80mg, ie 45mg/m2), Ifosfamide reduced by 10% (1500mg/m2 ? 1350mg/m2).   - Tolerated Cycle 2 Doxil/Ifos (D1=12/1/21) better, so plan to proceed with same dosing for cycle 3.   - PICC ordered on admission, remove at discharge. Port placement previously scheduled for 1/5/22 though canceled d/t this admission. Will need to rechedule port before Cycle 4, will request.   - Outpatient team plans to restage with repeat imaging after Cycle 4.                                            Treatment Plan: Doxil + ifosfamide (C3D1=1/4/22).               Doxil 70mg IV once - D1                Ifosfamide/Mesna 1350 mg/m2 (2510 mg) CIVI - D1-6                Mesna 1350 mg/m2 (2510 mg) IV over 18 hrs - starting D7                Pre-meds: Emend 150 mg once Day 1 and Day 6; Zofran 8 mg Q8H                Neulasta injection - D8, requested for 1/13 at [place]     # Cancer-related pain  - Continue PTA Celebrex 200 mg BID   - Continue PTA topical Bengay PRN at bedtime to left back      # Normocytic anemia  Noted intermittently. Likely related to chemo and underlying disease.   - Transfuse to maintain Hgb >7, will sign blood consent if indicated     RENAL/FEN  # H/o HUSSEIN  Baseline Cr ~0.8 which is noted to transiently increased with previously chemo cycles, presumed d/t hypovolemia in the setting of nausea with poor PO intake.   - Continue with twice weekly IVF  infusion appts OP  - Follow daily CMP  - No mIVF; bolus PRN.      # Hypophosphatemia, intermittent  # Hypokalemia, intermittent  Secondary to ifosfamide. Required additional phos and K replacement after discharge from C1 Doxil/Ifos.  - Follow Phos and K daily while inpatient  - Continue PTA PO Phos and KCl   - Replete additional per standing protocol     GI  # Transaminitis, stable  First noted in late Nov: , , TBili WNL on 11/29/21. PTA statin was subsequently held. Per outpatient notes, he did not have any abdominal pain or other symptoms concerning for viral hepatitis. Discussed with Dr. Wolff, and it was discussed that this could be due to his statin and OK to proceed with chemotherapy as planned above. He also has known liver metastases which are likely contributing.   - Continue to hold PTA statin  - Follow LFTs daily     # History of chemotherapy-induced nausea  Improved C2 with the following regimen, plan to continue this admission:  - Scheduled Zofran Q8H  - PRN compazine and Ativan  - Emend 150 mg IV x1 on D1 and D6 of chemo regimen  - Zyprexa 5 mg at bedtime which has been helpful in past      # GERD  - Continue PTA omeprazole  - TUMS and Pepto Bismol available PRN      CV  # CAD   Followed by Dr. Gigi Vernon at [facility]. He was noted to have coronary calcium on chest CT. CTA 9/29/21 with moderate mid LAD stenosis. Small to moderate caliber ramus branch with severe diffuse disease. Echo completed 9/29/21 with normal EF and no valvular dysfunction. Repeat ECHO on 10/27 with EF 60-65%, early diastolic dysfunction but otherwise no change from previous.  - PTA rosuvastatin 40 mg daily HELD for transaminitis  - Encourage outpatient cardiology follow-up as recommended     # H/o intermittent elevated blood pressures  BPs ranging from normotensive to hypertensive (with SBP 140s-160) during previous admissions. He states that he runs higher at baseline (though this is based on  "clinic assessments as he does not typically monitor his BP at home). Appears recent intermittent clinic BPs sit at 140s-150s/80s-90s when seen in person, otherwise many of the visits are currently virtual. He is not on any antihypertensive medications PTA. Suspect he has undiagnosed essential HTN. Will monitor closely this admission.    - Would recommend outpatient PCP follow-up for further discussion of BP monitoring and management.   - Oral Hydralazine available PRN (SBP >170, DBP >100)     ENDO  # Panhypopituitarism  # Macroadenoma of the pituitary gland  Diagnosed in 2010. Found to have a pituitary macroadenoma, 1.9 cm in largest dimension. Underwent hypophysectomy on 8/23/10. Subsequent presumptive pituitary deficiency, on empiric meds for adrenal insufficiency and thyroid. Previously followed by Dr. Bill Mccall; now has established care with Dr. Jamir Grant on 11/11/21.  - Continue PTA Levothyroxine  - Continue PTA Hydrocortisone 15 mg qAM and 10 mg qPM  - Continue endocrinology follow-up as scheduled (next scheduled for 5/16/22)     ENT  # L eye sty  Noted to inner lower L eyelid. Pt's wife brings it up. Pt states has gotten them before and \"typically resolve on their own\". This one appeared a couple of days ago. Currently not irritating or bothering pt. No visual changes.   - Could consider ophtho consult if worsening symptoms or visual changes, however given is not currently bothersome to pt will monitor closely.   - Consider outpatient ophtho referral on discharge if appropriate    # Vocal cord dysfunction  Manifested as hoarseness. Seen by ENT (Dr. Kyree Bearden) on 6/21/21 and noted to have left true vocal fold motion impairment from mediastinal mass. Underwent injection of ProLaryn (filler/bulking agent) on 7/1/21, which reportedly has been minimally beneficial. Seen in follow-up by Dr. Bearden on 9/1/21 and noted to have minimal improvement; he was then referred to the [facility] " ([facility]).   - No acute inpatient management issues  - Continue outpatient ENT follow-up as previously scheduled (next scheduled for 3/10/22)     # Oral candidiasis  # H/o mucositis  Significant 2/2 Doxil. Noted after C1 chemo, then better following C2 dose-reduction. On admission patient reports thrush again starting a couple days prior to admission. Restarted Nystatin at home on 1/3. Denies any mouth sores though endorses poor taste and tongue coating. Still with good PO intake at home.   - Continue salt/soda swishes  - Continue Nystatin QID with this cycle  - MMW, Doxepin available PRN      DERM  # H/o Hand/foot  2/2 Doxil. Quite bothersome with previous C2.   - Iced hands and mouth during Doxil infusion, tolerated well      # Rosacea  - Continue PTA metronidazole gel     PSYCH  # History of anxiety/depression  Situational, related to malaise/illness. Previously placed health psych referral.  - No acute inpatient management needs     FEN:  - Encouraged PO fluids, bolus PRN  - PRN lyte replacement  - RDAT     Prophy/Misc:  - VTE: ppx Lovenox 40mg daily during admission   - GI/PUD: PTA omeprazole, Tums PRN  - Bowels: Senna and Miralax PRN  - Activity: Consider consulting PT if indicated     Code: FULL  Disposition: Admitted to Oncology Team 3 service for scheduled chemotherapy 7-day stay. Anticipate discharge on 1/10 pending chemo timing and barring complications.    Follow-up: Follows with Dr. Wolff  - Twice weekly labs/infusion appts are scheduled at [facility] follow-up visit is scheduled on 1/12  - Requested Neulasta infusion be added to already-scheduled infusion appt on 1/13 at [place]   - Will request repeat scheduling of port placement      Patient and plan of care was discussed with attending physician Dr. Cheema.    Izabella Owens (Ryan, PAMARIELA  Hematology/Oncology  Pager: [phone]    Interval History   NAEO. PICC placed and Doxil given. Pt chewed on ice and iced hands during infusion. Tolerated well without  any complications. Ifos started to infuse. Has remained afebrile and VSS. No new complaints this morning. Ifrah denies nausea this morning and reports mouth feels a bit better though coating is still present. Up and eating breakfast this morning. Eye sty is not bothersome and vision is unchanged. No changed to slight bilateral leg swelling above sock line. Discussed plan to proceed with chemo and patient is in agreement with plan.     A comprehensive review of systems was obtained and is negative other than noted here or in the HPI.     Physical Exam   Temp: 97.1  F (36.2  C) Temp src: Oral BP: 139/70 Pulse: 78   Resp: 20 SpO2: 100 % O2 Device: None (Room air)    Vitals:    01/04/22 1533 01/05/22 0828   Weight: 68.3 kg (150 lb 9.2 oz) 68.3 kg (150 lb 8 oz)     Vital Signs with Ranges  Temp:  [96.2  F (35.7  C)-97.1  F (36.2  C)] 97.1  F (36.2  C)  Pulse:  [75-84] 78  Resp:  [18-20] 20  BP: (139-169)/() 139/70  SpO2:  [98 %-100 %] 100 %  I/O last 3 completed shifts:  In: 30 [I.V.:30]  Out: -     General: Awake and interactive. No acute distress. Pleasant male seen sitting upright in bed eating breakfast.   Skin: Warm, dry, and intact without rashes or lesions.   Head: Normocephalic and atraumatic.   HEENT: White/yellow coating present to tongue. No sores visible. Small sty present to L inner/lower eyelid. No surrounding erythema or drainage.   Lymph: Neck supple.   Cardiac: Regular rate and rhythm.   Respiratory: No signs of respiratory distress or accessory muscle use. Lungs CTAB.  Abd: Soft, symmetric, and non-tender without distension. BS present and normoactive.   Extremities: 1+ pitting edema to bilateral LE above sock line.   Neuro: A&Ox3 with normal speech. Memory and thought process preserved. CN II-XII grossly intact.   Psych: Appropriate mood and affect.     Medications     - MEDICATION INSTRUCTIONS -         celecoxib  200 mg Oral BID     Chemotherapy Infusing-Continuous Infusion   Does not apply Q8H      [START ON 1/11/2022] dextrose 5% water  10-20 mL Intracatheter Daily at 8 pm     [START ON 1/11/2022] dextrose 5% water  10-20 mL Intracatheter Daily at 8 pm     enoxaparin ANTICOAGULANT  40 mg Subcutaneous Q24H     [START ON 1/11/2022] filgrastim (NEUPOGEN/GRANIX) intravenous  5 mcg/kg (Treatment Plan Recorded) Intravenous Daily at 8 pm     heparin lock flush  5-20 mL Intracatheter Q24H     heparin lock flush  5-20 mL Intracatheter Q24H     hydrocortisone  10 mg Oral Daily     hydrocortisone  20 mg Oral QAM     ifosfamide (IFEX) infusion  1,350 mg/m2 (Treatment Plan Recorded) Intravenous Q24H     levothyroxine  75 mcg Oral QAM     [START ON 1/10/2022] mesna (MESNEX) infusion  1,350 mg/m2 (Treatment Plan Recorded) Intravenous Once     metroNIDAZOLE   Topical BID     nystatin  1,000,000 Units Oral 4x Daily     OLANZapine  5 mg Oral At Bedtime     omeprazole  40 mg Oral Daily     ondansetron  8 mg Oral Q8H     phosphorus tablet 250 mg  500 mg Oral BID     potassium chloride  20 mEq Oral Daily     sodium chloride (PF)  10-40 mL Intracatheter Q8H     sodium chloride (PF)  10-40 mL Intracatheter Q8H     sodium chloride (PF)  10-40 mL Intracatheter Q8H     cholecalciferol  25 mcg Oral Daily       Data   Results for orders placed or performed during the hospital encounter of 01/04/22 (from the past 24 hour(s))   CBC with platelets differential    Narrative    The following orders were created for panel order CBC with platelets differential.  Procedure                               Abnormality         Status                     ---------                               -----------         ------                     CBC with platelets and d...[[id number]]  Abnormal            Final result                 Please view results for these tests on the individual orders.   Comprehensive metabolic panel   Result Value Ref Range    Sodium 143 133 - 144 mmol/L    Potassium 3.5 3.4 - 5.3 mmol/L    Chloride 109 94 - 109 mmol/L     Carbon Dioxide (CO2) 27 20 - 32 mmol/L    Anion Gap 7 3 - 14 mmol/L    Urea Nitrogen 16 7 - 30 mg/dL    Creatinine 0.79 0.66 - 1.25 mg/dL    Calcium 8.9 8.5 - 10.1 mg/dL    Glucose 140 (H) 70 - 99 mg/dL    Alkaline Phosphatase 194 (H) 40 - 150 U/L    AST 20 0 - 45 U/L    ALT 29 0 - 70 U/L    Protein Total 6.7 (L) 6.8 - 8.8 g/dL    Albumin 3.1 (L) 3.4 - 5.0 g/dL    Bilirubin Total 0.3 0.2 - 1.3 mg/dL    GFR Estimate >90 >60 mL/min/1.73m2   Magnesium   Result Value Ref Range    Magnesium 2.2 1.6 - 2.3 mg/dL   Phosphorus   Result Value Ref Range    Phosphorus 2.7 2.5 - 4.5 mg/dL   Lactate Dehydrogenase   Result Value Ref Range    Lactate Dehydrogenase 230 (H) 85 - 227 U/L   INR   Result Value Ref Range    INR 1.12 0.85 - 1.15   Fibrinogen activity   Result Value Ref Range    Fibrinogen Activity 410 170 - 490 mg/dL   Partial thromboplastin time   Result Value Ref Range    aPTT 36 22 - 38 Seconds   CBC with platelets and differential   Result Value Ref Range    WBC Count 12.2 (H) 4.0 - 11.0 10e3/uL    RBC Count 3.55 (L) 4.40 - 5.90 10e6/uL    Hemoglobin 10.2 (L) 13.3 - 17.7 g/dL    Hematocrit 32.7 (L) 40.0 - 53.0 %    MCV 92 78 - 100 fL    MCH 28.7 26.5 - 33.0 pg    MCHC 31.2 (L) 31.5 - 36.5 g/dL    RDW 19.0 (H) 10.0 - 15.0 %    Platelet Count 317 150 - 450 10e3/uL    % Neutrophils 74 %    % Lymphocytes 10 %    % Monocytes 12 %    % Eosinophils 0 %    % Basophils 1 %    % Immature Granulocytes 3 %    NRBCs per 100 WBC 0 <1 /100    Absolute Neutrophils 9.0 (H) 1.6 - 8.3 10e3/uL    Absolute Lymphocytes 1.2 0.8 - 5.3 10e3/uL    Absolute Monocytes 1.5 (H) 0.0 - 1.3 10e3/uL    Absolute Eosinophils 0.1 0.0 - 0.7 10e3/uL    Absolute Basophils 0.1 0.0 - 0.2 10e3/uL    Absolute Immature Granulocytes 0.4 <=0.4 10e3/uL    Absolute NRBCs 0.0 10e3/uL   Double Lumen PICC Placement    Harriett Quiroga, PETER     1/4/2022  6:35 PM  [facility]    Double Lumen PICC  Placement    Date/Time: 1/4/2022 6:27 PM  Performed by: Harriett Panda RN  Authorized by: Izabella Owens PA-C   Indications: vascular access      UNIVERSAL PROTOCOL   Site Marked: Yes  Prior Images Obtained and Reviewed:  Yes  Required items: Required blood products, implants, devices and special   equipment available    Patient identity confirmed:  Verbally with patient and arm band  NA - No sedation, light sedation, or local anesthesia  Confirmation Checklist:  Patient's identity using two indicators, relevant   allergies, procedure was appropriate and matched the consent or emergent   situation and correct equipment/implants were available  Time out: Immediately prior to the procedure a time out was called    Universal Protocol: the Joint Commission Universal Protocol was followed    Preparation: Patient was prepped and draped in usual sterile fashion       ANESTHESIA    Anesthesia: Local infiltration  Local Anesthetic:  Lidocaine 1% without epinephrine  Anesthetic Total (mL):  4.5      SEDATION    Patient Sedated: No        Preparation: skin prepped with ChloraPrep  Skin prep agent: skin prep agent completely dried prior to procedure  Sterile barriers: maximum sterile barriers were used: cap, mask, sterile   gown, sterile gloves, and large sterile sheet  Hand hygiene: hand hygiene performed prior to central venous catheter   insertion  Type of line used: PICC and Power PICC  Catheter type: double lumen  Lumen type: non-valved  Catheter size: 5 Fr  Brand: Bard  Lot number: [id number]  Placement method: venipuncture, MST, ultrasound and tip confirmation   system  Number of attempts: 1  Successful placement: yes  Orientation: right  Location: cephalic vein (0.41 cm vein diameter)  Arm circumference: adults 10 cm  Extremity circumference: 28  Visible catheter length: 1  Total catheter length: 41  Dressing and securement: blood cleaned with CHG, chlorhexidine disc   applied, glue, statlock, site cleaned and  sterile dressing applied  Post procedure assessment: blood return through all ports and free fluid   flow (BARD)  PROCEDURE   Patient Tolerance:  Patient tolerated the procedure well with no immediate   complications   CBC with platelets differential    Narrative    The following orders were created for panel order CBC with platelets differential.  Procedure                               Abnormality         Status                     ---------                               -----------         ------                     CBC with platelets and d...[[id number]]  Abnormal            Final result                 Please view results for these tests on the individual orders.   Comprehensive metabolic panel   Result Value Ref Range    Sodium 140 133 - 144 mmol/L    Potassium 3.8 3.4 - 5.3 mmol/L    Chloride 107 94 - 109 mmol/L    Carbon Dioxide (CO2) 27 20 - 32 mmol/L    Anion Gap 6 3 - 14 mmol/L    Urea Nitrogen 16 7 - 30 mg/dL    Creatinine 0.71 0.66 - 1.25 mg/dL    Calcium 8.7 8.5 - 10.1 mg/dL    Glucose 202 (H) 70 - 99 mg/dL    Alkaline Phosphatase 195 (H) 40 - 150 U/L    AST 21 0 - 45 U/L    ALT 29 0 - 70 U/L    Protein Total 6.8 6.8 - 8.8 g/dL    Albumin 3.2 (L) 3.4 - 5.0 g/dL    Bilirubin Total 0.3 0.2 - 1.3 mg/dL    GFR Estimate >90 >60 mL/min/1.73m2   CBC with platelets and differential   Result Value Ref Range    WBC Count 12.0 (H) 4.0 - 11.0 10e3/uL    RBC Count 3.43 (L) 4.40 - 5.90 10e6/uL    Hemoglobin 9.8 (L) 13.3 - 17.7 g/dL    Hematocrit 31.8 (L) 40.0 - 53.0 %    MCV 93 78 - 100 fL    MCH 28.6 26.5 - 33.0 pg    MCHC 30.8 (L) 31.5 - 36.5 g/dL    RDW 19.1 (H) 10.0 - 15.0 %    Platelet Count 301 150 - 450 10e3/uL    % Neutrophils 79 %    % Lymphocytes 9 %    % Monocytes 8 %    % Eosinophils 0 %    % Basophils 1 %    % Immature Granulocytes 3 %    NRBCs per 100 WBC 0 <1 /100    Absolute Neutrophils 9.5 (H) 1.6 - 8.3 10e3/uL    Absolute Lymphocytes 1.0 0.8 - 5.3 10e3/uL    Absolute Monocytes 1.0 0.0 - 1.3 10e3/uL     Absolute Eosinophils 0.0 0.0 - 0.7 10e3/uL    Absolute Basophils 0.1 0.0 - 0.2 10e3/uL    Absolute Immature Granulocytes 0.4 <=0.4 10e3/uL    Absolute NRBCs 0.0 10e3/uL   CBC with platelets differential    Narrative    The following orders were created for panel order CBC with platelets differential.  Procedure                               Abnormality         Status                     ---------                               -----------         ------                     CBC with platelets and d...[[id number]]  Abnormal            Final result                 Please view results for these tests on the individual orders.   Comprehensive metabolic panel   Result Value Ref Range    Sodium 143 133 - 144 mmol/L    Potassium 3.6 3.4 - 5.3 mmol/L    Chloride 109 94 - 109 mmol/L    Carbon Dioxide (CO2) 29 20 - 32 mmol/L    Anion Gap 5 3 - 14 mmol/L    Urea Nitrogen 14 7 - 30 mg/dL    Creatinine 0.71 0.66 - 1.25 mg/dL    Calcium 8.7 8.5 - 10.1 mg/dL    Glucose 126 (H) 70 - 99 mg/dL    Alkaline Phosphatase 165 (H) 40 - 150 U/L    AST 17 0 - 45 U/L    ALT 23 0 - 70 U/L    Protein Total 5.9 (L) 6.8 - 8.8 g/dL    Albumin 2.5 (L) 3.4 - 5.0 g/dL    Bilirubin Total 0.3 0.2 - 1.3 mg/dL    GFR Estimate >90 >60 mL/min/1.73m2   Magnesium   Result Value Ref Range    Magnesium 2.3 1.6 - 2.3 mg/dL   Phosphorus   Result Value Ref Range    Phosphorus 3.2 2.5 - 4.5 mg/dL   CBC with platelets and differential   Result Value Ref Range    WBC Count 9.8 4.0 - 11.0 10e3/uL    RBC Count 3.15 (L) 4.40 - 5.90 10e6/uL    Hemoglobin 9.0 (L) 13.3 - 17.7 g/dL    Hematocrit 29.7 (L) 40.0 - 53.0 %    MCV 94 78 - 100 fL    MCH 28.6 26.5 - 33.0 pg    MCHC 30.3 (L) 31.5 - 36.5 g/dL    RDW 19.1 (H) 10.0 - 15.0 %    Platelet Count 261 150 - 450 10e3/uL    % Neutrophils 66 %    % Lymphocytes 15 %    % Monocytes 14 %    % Eosinophils 1 %    % Basophils 1 %    % Immature Granulocytes 3 %    NRBCs per 100 WBC 0 <1 /100    Absolute Neutrophils 6.7 1.6 - 8.3  10e3/uL    Absolute Lymphocytes 1.5 0.8 - 5.3 10e3/uL    Absolute Monocytes 1.3 0.0 - 1.3 10e3/uL    Absolute Eosinophils 0.1 0.0 - 0.7 10e3/uL    Absolute Basophils 0.1 0.0 - 0.2 10e3/uL    Absolute Immature Granulocytes 0.2 <=0.4 10e3/uL    Absolute NRBCs 0.0 10e3/uL

## 2022-01-05 NOTE — PROCEDURES
[facility]    Double Lumen PICC Placement    Date/Time: 1/4/2022 6:27 PM  Performed by: Harriett Panda RN  Authorized by: Izabella Owens PA-C   Indications: vascular access      UNIVERSAL PROTOCOL   Site Marked: Yes  Prior Images Obtained and Reviewed:  Yes  Required items: Required blood products, implants, devices and special equipment available    Patient identity confirmed:  Verbally with patient and arm band  NA - No sedation, light sedation, or local anesthesia  Confirmation Checklist:  Patient's identity using two indicators, relevant allergies, procedure was appropriate and matched the consent or emergent situation and correct equipment/implants were available  Time out: Immediately prior to the procedure a time out was called    Universal Protocol: the Joint Commission Universal Protocol was followed    Preparation: Patient was prepped and draped in usual sterile fashion       ANESTHESIA    Anesthesia: Local infiltration  Local Anesthetic:  Lidocaine 1% without epinephrine  Anesthetic Total (mL):  4.5      SEDATION    Patient Sedated: No        Preparation: skin prepped with ChloraPrep  Skin prep agent: skin prep agent completely dried prior to procedure  Sterile barriers: maximum sterile barriers were used: cap, mask, sterile gown, sterile gloves, and large sterile sheet  Hand hygiene: hand hygiene performed prior to central venous catheter insertion  Type of line used: PICC and Power PICC  Catheter type: double lumen  Lumen type: non-valved  Catheter size: 5 Fr  Brand: Bard  Lot number: [id number]  Placement method: venipuncture, MST, ultrasound and tip confirmation system  Number of attempts: 1  Successful placement: yes  Orientation: right  Location: cephalic vein (0.41 cm vein diameter)  Arm circumference: adults 10 cm  Extremity circumference: 28  Visible catheter length: 1  Total catheter length: 41  Dressing and securement: blood cleaned with  CHG, chlorhexidine disc applied, glue, statlock, site cleaned and sterile dressing applied  Post procedure assessment: blood return through all ports and free fluid flow (BARD)  PROCEDURE   Patient Tolerance:  Patient tolerated the procedure well with no immediate complications

## 2022-01-05 NOTE — TELEPHONE ENCOUNTER
Reason for Call:  Other call back    Detailed comments: Pt inpatient at [facility] so procedure today with Dr. Echevarria for port placement was cancelled.  The care coordinator at [facility] is calling to get this procedure rescheduled prior to the patient being discharged.  Please call the patient to get this arranged.    Phone Number Patient can be reached at: Home number on file [phone] (home)    Best Time:     Can we leave a detailed message on this number? Not Applicable    Call taken on 1/5/2022 at 11:49 AM by Tiffany Mcnulty

## 2022-01-05 NOTE — PLAN OF CARE
AVSS. Denied pain. C/O intermittent nausea, relieved with scheduled Zofran. Up ad kaitlynn in room and halls. Good po intake. Adequate urine output. LBM 1/4. Currently receiving cycle 3 day 1 CIVI Ifosfamide and Mesna with brisk blood return noted from PICC q4 hours. No acute events. Continue with plan of care.

## 2022-01-05 NOTE — PLAN OF CARE
Nursing Focus: Chemotherapy  D: Positive blood return via PICC. Insertion site is clean/dry/intact, dressing intact with no complaints of pain.  Urine output is recorded in intake in Doc Flowsheet.    I: Premedications given per order (see electronic medical administration record). Dose #1 of Ifos started to infuse over 24 hours. Reviewed pt teaching on chemotherapy side effects.  Pt denies need for further teaching. Chemotherapy double checked per protocol by two chemotherapy competent RN's.   A: Tolerating procedure well. Denies nausea and or pain.   P: Continue to monitor urine output and symptoms of nausea. Screen for symptoms of toxicity.

## 2022-01-05 NOTE — PROGRESS NOTES
Nursing Focus: Chemotherapy  D: Positive blood return via PICC. Insertion site is clean/dry/intact, dressing intact with no complaints of pain.  Urine output is recorded in intake in Doc Flowsheet.    I: Premedications given per order (see electronic medical administration record). Dose #1 of Doxil started to infuse over 1 hours. Reviewed pt teaching on chemotherapy side effects.  Pt denies need for further teaching. Chemotherapy double checked per protocol by two chemotherapy competent RN's.   A: Tolerating procedure well. Denies nausea and or pain.   P: Continue to monitor urine output and symptoms of nausea. Screen for symptoms of toxicity.    [id number]

## 2022-01-05 NOTE — PLAN OF CARE
2300 - 0700  Pt hypertensive, max 160/107 while shivering from chewing ice and holding ice packs during Doxil infusion for prevention of mucositis, BP's later improved. OVS stable, afebrile. Pt denied pain/SOB/n/v. CIVI Ifos infusing, good blood return noted, no concerns. Continue POC.

## 2022-01-05 NOTE — PHARMACY-ADMISSION MEDICATION HISTORY
Admission Medication History Completed by Pharmacy    See [facility] Admission Navigator for allergy information, preferred outpatient pharmacy, prior to admission medications and immunization status.     Medication History Sources:     Patient    Surescripts    Changes made to PTA medication list (reason):    Added: None    Deleted: None    Changed: None    Additional Information:    Patient has been taking olanzapine daily at home for nausea. Recommend adding this if patient has break-through nausea.     Prior to Admission medications    Medication Sig Last Dose Taking? Auth Provider   acetaminophen (TYLENOL) 500 MG tablet Take 500-1,000 mg by mouth every 6 hours as needed for mild pain Past Week at Unknown time Yes Unknown, Entered By History   calcium carbonate (TUMS) 500 MG chewable tablet Take 1 tablet (500 mg) by mouth 3 times daily as needed for heartburn More than a month at Unknown time Yes Chiquita Norris PA-C   celecoxib (CELEBREX) 200 MG capsule Take 1 capsule (200 mg) by mouth 2 times daily. Note this is a new a strength! Only one capsule at a time! 1/4/2022 at Unknown time Yes Sal Handy MD   cholecalciferol (VITAMIN D-1000 MAX ST) 1000 units TABS Take 1,000 Units by mouth daily  1/4/2022 at Unknown time Yes Reported, Patient   doxepin (SINEQUAN) 10 MG/ML (HIGH CONC) solution Take 2 mLs (20 mg) by mouth every 4 hours as needed for other (mouth pain) . Mix with equal parts tap water. Swish and hold in mouth ~60 seconds then spit out. More than a month at Unknown time Yes Sal Handy MD   guaiFENesin-codeine (GUAIFENESIN AC) 100-10 MG/5ML syrup Take 10 mLs by mouth every 4 hours as needed for cough Past Week at Unknown time Yes Maynor Rios PA   hydrocortisone (CORTEF) 10 MG tablet Take 20 mg in the morning and 10 mg in the afternoon 1/4/2022 at Unknown time Yes Jamir Grant MD   levothyroxine (SYNTHROID/LEVOTHROID) 75 MCG tablet Take 1 tablet (75 mcg) by  mouth every morning 1/4/2022 at Unknown time Yes Jamir Grant MD   LORazepam (ATIVAN) 0.5 MG tablet Take 1 tablet (0.5 mg) by mouth every 4 hours as needed for nausea or vomiting . Caution: causes sedation. Past Week at Unknown time Yes Rayna Martin PA-C   Menthol-Methyl Salicylate (DALIA MALIK GREASELESS) cream Apply topically every 6 hours as needed Past Week at Unknown time Yes Unknown, Entered By History   metroNIDAZOLE (METROGEL) 0.75 % external gel Apply topically 2 times daily 1/4/2022 at Unknown time Yes Fabiola Armstrong PA-C   nystatin (MYCOSTATIN) 543399 UNIT/ML suspension Take 5 mLs (500,000 Units) by mouth 4 times daily . Start at first signs of white coating on tongue. 1/4/2022 at Unknown time Yes Maynor Rios PA   OLANZapine (ZYPREXA) 5 MG tablet Take 1 tablet (5 mg) by mouth At Bedtime Continue until your nausea resolves 1/3/2022 at Unknown time Yes Chiquita Norris PA-C   omeprazole (PRILOSEC) 20 MG DR capsule Take 2 capsules (40 mg) by mouth daily More than a month at Unknown time Yes Maynor Rios PA   ondansetron (ZOFRAN) 4 MG tablet Take 1-2 tablets (4-8 mg) by mouth every 8 hours as needed for nausea Past Week at Unknown time Yes Rayna Martin PA-C   phosphorus tablet 250 mg (PHOSPHA 250 NEUTRAL) 250 MG per tablet Take 2 tablets (500 mg) by mouth 2 times daily 1/4/2022 at Unknown time Yes Maynor Rios PA   potassium chloride ER (K-TAB) 20 MEQ CR tablet Take 1 tablet (20 mEq) by mouth daily 1/4/2022 at Unknown time Yes Maynor Rios PA   prochlorperazine (COMPAZINE) 5 MG tablet Take 1 tablet (5 mg) by mouth every 6 hours as needed for nausea or vomiting . Caution: causes sedation. More than a month at Unknown time Yes Mario, Rayna Ema, PA-C   triamcinolone (KENALOG) 0.1 % external cream Apply topically 2 times daily  Past Month at Unknown time Yes Reported, Patient   Insulin Syringe-Needle U-100 27.5G X  "5/8\" 2 ML MISC 1 each daily as needed (with solucortef for adrenal crisis)   Jamir Grant MD       Date completed: 01/04/22    Medication history completed by: Chato Whatley, TinD            "

## 2022-01-05 NOTE — PROGRESS NOTES
Nursing Focus: Admission  D: Arrived at 1700 from home. Patient accompanied by wife. Admitted for chemo. No complaints    I: Admission process began.  Patient oriented to room, enviroment, call light.  MD notified of patient's arrival on unit.     A: Vital signs stable, afebrile.  Patient stable at this time.      P: Implement plan of care when available. Continue to monitor patient. Nursing interventions as appropriate. Notify MD with changes in pt status.

## 2022-01-05 NOTE — PROGRESS NOTES
"Clinic Care Coordination Contact  Ambulatory Care Coordination to Inpatient Care Management   Hand-In Communication    Date:  January 5, 2022  Name: Ifrah Huitron is enrolled in Ambulatory Care Coordination program and I am the Lead Care Coordinator.  CC Contact Information: Epic InSpoonRocketsket + phone  Payor Source: Payor: MEDICARE / Plan: MEDICARE / Product Type: Medicare /   Current services in place:     Please see the CC Snaphot and Care Management Flowsheets for specific  details of this Ifrah Huitron care plan.   Additional details/specific concerns r/t this admission:   Goal Statement: I want to \"whip\" this cancer   Date Goal set: 11/3/2021  Barriers: None identified   Strengths: Motivated and great family support   Date to Achieve By: 2/3/2022  Patient expressed understanding of goal: Yes  Action steps to achieve this goal:  1. I will keep my future  Primary care provider,ENT,Endocrinology and Oncology appointments   2. I will take my medications as directed and keep future Chemotherapy appointments      I will follow this admission in Epic. Please feel free to contact me with questions or for further collaboration in discharge planning.  [facility]   Oliva García RN, Care Coordinator   [facility]'s   E-mail [email or web address]   [phone]  "

## 2022-01-05 NOTE — PLAN OF CARE
/93-- order to notify provider if over 160 so will pass on to nights to re-check, OVSS. PICC placed; ok to use. Dose #1 Doxil started to infuse over 1 hour. See chemo note. Pt ambulating frequently in halls. Ice give to pt to chew on and cold packs for hands during doxil infusion as ordered. ifos to infuse after doxil is finished. No c/o pain or nausea.

## 2022-01-06 LAB
ALBUMIN SERPL-MCNC: 2.7 G/DL (ref 3.4–5)
ALP SERPL-CCNC: 173 U/L (ref 40–150)
ALT SERPL W P-5'-P-CCNC: 22 U/L (ref 0–70)
ANION GAP SERPL CALCULATED.3IONS-SCNC: 7 MMOL/L (ref 3–14)
AST SERPL W P-5'-P-CCNC: 14 U/L (ref 0–45)
BASOPHILS # BLD AUTO: 0.1 10E3/UL (ref 0–0.2)
BASOPHILS NFR BLD AUTO: 1 %
BILIRUB SERPL-MCNC: 0.4 MG/DL (ref 0.2–1.3)
BUN SERPL-MCNC: 14 MG/DL (ref 7–30)
CALCIUM SERPL-MCNC: 8.8 MG/DL (ref 8.5–10.1)
CHLORIDE BLD-SCNC: 109 MMOL/L (ref 94–109)
CO2 SERPL-SCNC: 26 MMOL/L (ref 20–32)
CREAT SERPL-MCNC: 0.8 MG/DL (ref 0.66–1.25)
EOSINOPHIL # BLD AUTO: 0.1 10E3/UL (ref 0–0.7)
EOSINOPHIL NFR BLD AUTO: 1 %
ERYTHROCYTE [DISTWIDTH] IN BLOOD BY AUTOMATED COUNT: 19.5 % (ref 10–15)
GFR SERPL CREATININE-BSD FRML MDRD: >90 ML/MIN/1.73M2
GLUCOSE BLD-MCNC: 95 MG/DL (ref 70–99)
HCT VFR BLD AUTO: 31.5 % (ref 40–53)
HGB BLD-MCNC: 9.8 G/DL (ref 13.3–17.7)
IMM GRANULOCYTES # BLD: 0.2 10E3/UL
IMM GRANULOCYTES NFR BLD: 2 %
LYMPHOCYTES # BLD AUTO: 1.7 10E3/UL (ref 0.8–5.3)
LYMPHOCYTES NFR BLD AUTO: 17 %
MAGNESIUM SERPL-MCNC: 2.2 MG/DL (ref 1.6–2.3)
MCH RBC QN AUTO: 29 PG (ref 26.5–33)
MCHC RBC AUTO-ENTMCNC: 31.1 G/DL (ref 31.5–36.5)
MCV RBC AUTO: 93 FL (ref 78–100)
MONOCYTES # BLD AUTO: 1.4 10E3/UL (ref 0–1.3)
MONOCYTES NFR BLD AUTO: 13 %
NEUTROPHILS # BLD AUTO: 7 10E3/UL (ref 1.6–8.3)
NEUTROPHILS NFR BLD AUTO: 66 %
NRBC # BLD AUTO: 0 10E3/UL
NRBC BLD AUTO-RTO: 0 /100
PHOSPHATE SERPL-MCNC: 3 MG/DL (ref 2.5–4.5)
PLATELET # BLD AUTO: 289 10E3/UL (ref 150–450)
POTASSIUM BLD-SCNC: 3.6 MMOL/L (ref 3.4–5.3)
PROT SERPL-MCNC: 6.2 G/DL (ref 6.8–8.8)
RBC # BLD AUTO: 3.38 10E6/UL (ref 4.4–5.9)
SODIUM SERPL-SCNC: 142 MMOL/L (ref 133–144)
WBC # BLD AUTO: 10.5 10E3/UL (ref 4–11)

## 2022-01-06 PROCEDURE — 84100 ASSAY OF PHOSPHORUS: CPT | Performed by: PHYSICIAN ASSISTANT

## 2022-01-06 PROCEDURE — 250N000013 HC RX MED GY IP 250 OP 250 PS 637: Performed by: PHYSICIAN ASSISTANT

## 2022-01-06 PROCEDURE — 85025 COMPLETE CBC W/AUTO DIFF WBC: CPT | Performed by: PHYSICIAN ASSISTANT

## 2022-01-06 PROCEDURE — 120N000002 HC R&B MED SURG/OB UMMC

## 2022-01-06 PROCEDURE — 99233 SBSQ HOSP IP/OBS HIGH 50: CPT | Mod: FS | Performed by: INTERNAL MEDICINE

## 2022-01-06 PROCEDURE — 36592 COLLECT BLOOD FROM PICC: CPT | Performed by: PHYSICIAN ASSISTANT

## 2022-01-06 PROCEDURE — 250N000011 HC RX IP 250 OP 636: Performed by: PHYSICIAN ASSISTANT

## 2022-01-06 PROCEDURE — 258N000003 HC RX IP 258 OP 636: Performed by: PHYSICIAN ASSISTANT

## 2022-01-06 PROCEDURE — 80053 COMPREHEN METABOLIC PANEL: CPT | Performed by: PHYSICIAN ASSISTANT

## 2022-01-06 PROCEDURE — 83735 ASSAY OF MAGNESIUM: CPT | Performed by: PHYSICIAN ASSISTANT

## 2022-01-06 PROCEDURE — 250N000011 HC RX IP 250 OP 636: Performed by: INTERNAL MEDICINE

## 2022-01-06 PROCEDURE — 82040 ASSAY OF SERUM ALBUMIN: CPT | Performed by: PHYSICIAN ASSISTANT

## 2022-01-06 PROCEDURE — 250N000013 HC RX MED GY IP 250 OP 250 PS 637: Performed by: INTERNAL MEDICINE

## 2022-01-06 RX ADMIN — CELECOXIB 200 MG: 200 CAPSULE ORAL at 07:55

## 2022-01-06 RX ADMIN — ONDANSETRON HYDROCHLORIDE 8 MG: 8 TABLET, FILM COATED ORAL at 14:22

## 2022-01-06 RX ADMIN — DIBASIC SODIUM PHOSPHATE, MONOBASIC POTASSIUM PHOSPHATE AND MONOBASIC SODIUM PHOSPHATE 500 MG: 852; 155; 130 TABLET ORAL at 06:01

## 2022-01-06 RX ADMIN — PANTOPRAZOLE SODIUM 40 MG: 40 TABLET, DELAYED RELEASE ORAL at 07:56

## 2022-01-06 RX ADMIN — HYDROCORTISONE 10 MG: 10 TABLET ORAL at 16:55

## 2022-01-06 RX ADMIN — OLANZAPINE 5 MG: 5 TABLET, FILM COATED ORAL at 21:40

## 2022-01-06 RX ADMIN — LEVOTHYROXINE SODIUM 75 MCG: 0.05 TABLET ORAL at 06:01

## 2022-01-06 RX ADMIN — Medication 5 ML: at 06:50

## 2022-01-06 RX ADMIN — NYSTATIN 1000000 UNITS: 100000 SUSPENSION ORAL at 11:18

## 2022-01-06 RX ADMIN — MESNA 2510 MG: 100 INJECTION, SOLUTION INTRAVENOUS at 23:16

## 2022-01-06 RX ADMIN — CELECOXIB 200 MG: 200 CAPSULE ORAL at 19:37

## 2022-01-06 RX ADMIN — METRONIDAZOLE: 7.5 GEL TOPICAL at 07:59

## 2022-01-06 RX ADMIN — CHOLECALCIFEROL TAB 25 MCG (1000 UNIT) 25 MCG: 25 TAB at 07:56

## 2022-01-06 RX ADMIN — NYSTATIN 1000000 UNITS: 100000 SUSPENSION ORAL at 16:52

## 2022-01-06 RX ADMIN — DIBASIC SODIUM PHOSPHATE, MONOBASIC POTASSIUM PHOSPHATE AND MONOBASIC SODIUM PHOSPHATE 500 MG: 852; 155; 130 TABLET ORAL at 19:37

## 2022-01-06 RX ADMIN — NYSTATIN 1000000 UNITS: 100000 SUSPENSION ORAL at 07:56

## 2022-01-06 RX ADMIN — PROCHLORPERAZINE MALEATE 5 MG: 5 TABLET ORAL at 19:39

## 2022-01-06 RX ADMIN — NYSTATIN 1000000 UNITS: 100000 SUSPENSION ORAL at 19:37

## 2022-01-06 RX ADMIN — HYDROCORTISONE 20 MG: 10 TABLET ORAL at 07:55

## 2022-01-06 RX ADMIN — ENOXAPARIN SODIUM 40 MG: 40 INJECTION SUBCUTANEOUS at 19:36

## 2022-01-06 RX ADMIN — POTASSIUM CHLORIDE 20 MEQ: 750 TABLET, EXTENDED RELEASE ORAL at 07:55

## 2022-01-06 RX ADMIN — ONDANSETRON HYDROCHLORIDE 8 MG: 8 TABLET, FILM COATED ORAL at 06:01

## 2022-01-06 RX ADMIN — METRONIDAZOLE: 7.5 GEL TOPICAL at 19:36

## 2022-01-06 RX ADMIN — ONDANSETRON HYDROCHLORIDE 8 MG: 8 TABLET, FILM COATED ORAL at 21:40

## 2022-01-06 RX ADMIN — PROCHLORPERAZINE MALEATE 5 MG: 5 TABLET ORAL at 08:05

## 2022-01-06 ASSESSMENT — MIFFLIN-ST. JEOR: SCORE: 1395.82

## 2022-01-06 ASSESSMENT — ACTIVITIES OF DAILY LIVING (ADL)
ADLS_ACUITY_SCORE: 6

## 2022-01-06 NOTE — PROGRESS NOTES
Nursing Focus: Chemotherapy  D: Positive blood return via PICC. Insertion site is clean/dry/intact, dressing intact with no complaints of pain.  Urine output is recorded in intake in Doc Flowsheet.    I: Premedications given per order (see electronic medical administration record). Dose #2 of Ifosfamide/mesna started to infuse over 24 hours. Reviewed pt teaching on chemotherapy side effects.  Pt denies need for further teaching. Chemotherapy double checked per protocol by two chemotherapy competent RN's.   A: Tolerating procedure well. Denies nausea and or pain.   P: Continue to monitor urine output and symptoms of nausea. Screen for symptoms of toxicity.

## 2022-01-06 NOTE — PLAN OF CARE
AVSS. Denied pain. No c/o nausea Up ad kaitlynn in room and halls. Good po intake. Adequate urine output.  cycle 3 day 1 CIVI Ifosfamide and Mesna continues PICC q4 hours. No acute events. Continue with plan of care.

## 2022-01-06 NOTE — PLAN OF CARE
2300 - 0700  VS stable, afebrile. Pt denied pain/SOB/n/v. CIVI chemo infusing, good blood return noted. Pt slept between nursing cares. No significant events overnight, continue POC.

## 2022-01-06 NOTE — PLAN OF CARE
D2 C3 Liposomal DOXOrubicin, Ifosfamide and Mesna. AVSS. Denied pain. C/O intermittent nausea, relieved with scheduled Zofran and prn Compazine (no emesis). Up ad kaitlynn in room and halls. Good po intake. Adequate urine output. LBM 1/5. Currently receiving D2 CIVI Ifosfamide and Mesna with brisk blood return noted from PICC q4 hours. No acute events. Continue with plan of care.

## 2022-01-06 NOTE — PLAN OF CARE
Nursing Focus: Chemotherapy  D: Positive blood return via PICC. Insertion site is clean/dry/intact, dressing intact with no complaints of pain.  Urine output is recorded in intake in Doc Flowsheet.    I: Premedications given per order (see electronic medical administration record). Dose #2 Ifos/Mesna started to infuse over 24 hours. Reviewed pt teaching on chemotherapy side effects.  Pt denies need for further teaching. Chemotherapy double checked per protocol by two chemotherapy competent RN's.   A: Tolerating procedure well. Denies nausea and or pain.   P: Continue to monitor urine output and symptoms of nausea. Screen for symptoms of toxicity.

## 2022-01-06 NOTE — PROGRESS NOTES
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 Thrush noted on admission, continues on Nystatin. Antiemetics plan in place, nausea has been stable thus far.     TODAY: D2 Doxil/Ifos  - No changes to plan of care today   - Proceed with protocol-directed chemo, tolerating well with no signs/symptoms of neurotoxicity thus far.   - Nausea under control. S/p D1 Emend. Continue scheduled Zofran and Zyprexa HS. PRN Compazine/Ativan.   - Continue Nystatin for thrush. MMW available for possibly-developing mucositis lesions to inner R cheek.   - Lytes stable/wnl, follow daily. Continue PTA Phos and KCl supplements.   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 Wolff. Patient presented to his PCP in 03/2021 with chest/left shoulder and back pain that led to imaging which revealed multiple lung mets, included a left pleural mass. There were difficulties in getting diagnostic biopsy; eventually, biopsy of the mediastinal mass (5/20/21) revealed a poorly characterized malignant spindle cell neoplasm with high PD-L1 expression (90%), Ki-67 70%. 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 Insufficient material to send [facility]. Baseline imaging (6/21/21) revealed progression of lung mets and left-sided subdiaphragmatic mass, stable liver lesions. He was seen by ENT for hoarseness, which was attributed to left true vocal fold motion impairment from mediastinal mass.  Given high PD-L1 expression, he was started on Keytruda (C1=6/21/21). Interval imaging (CT 8/2/21) revealed positive response to treatment. He received 6 cycles total, most recently C6=10/4/21. Unfortunately, restaging imaging (10/18/21) revealed interval increase in size of the LUQ/splenic mass; however, the mediastinal and left pleural mass were stable to slightly decreased in size. He met with Dr. Wolff, who recommended a change in therapy to Doxil/ifosfamide. He was admitted on 10/26/21 to receive Cycle #1 of Doxil/ifos which he tolerated reasonably well with no neurotoxicities, but did have moderate chemotherapy-induced nausea. After discharge he had significant mucositis, poor PO intake, nausea, and lyte derangements. Given adverse side effects, Cycle 2 was delayed by 1 week and doxil was dose reduced to 70mg (previously given 80mg, ie 45mg/m2), Ifosfamide reduced by 10% (1500mg/m2 ? 1350mg/m2).   - Tolerated Cycle 2 Doxil/Ifos (D1=12/1/21) better, so plan to proceed with same dosing for cycle 3.   - PICC ordered on admission, remove at discharge. Port placement previously scheduled for 1/5/22 though canceled d/t this admission. Will need to rechedule port before Cycle 4. I have requested and outpatient order placed. Outpatient team working to arrange prior to next cycle.   - Outpatient team plans to restage with repeat imaging after Cycle 4.                                            Treatment Plan: Doxil + ifosfamide (C3D1=1/4/22).               Doxil 70mg IV once - D1                Ifosfamide/Mesna 1350 mg/m2 (2510 mg) CIVI - D1-6                Mesna 1350 mg/m2 (2510 mg) IV over 18 hrs - starting D7                Pre-meds: Emend 150 mg once Day 1 and Day 6; Zofran 8 mg Q8H                Neulasta injection - D8, scheduled at [facility] on 1/13     # Cancer-related pain  - Continue PTA Celebrex 200 mg BID   - Continue PTA topical Bengay PRN at bedtime to left back      # Normocytic anemia  Noted  "intermittently. Likely related to chemo and underlying disease.   - Transfuse to maintain Hgb >7, will sign blood consent if indicated     RENAL/FEN  # H/o HUSSEIN, stable  Baseline Cr ~0.8 which is noted to transiently increased with previously chemo cycles, presumed d/t hypovolemia in the setting of nausea with poor PO intake. CR has remained at baseline throughout admission thus far.    - Continue with twice weekly IVF infusion appts OP  - Follow daily CMP  - No mIVF; bolus PRN.      # Hypophosphatemia, intermittent  # Hypokalemia, intermittent  Secondary to ifosfamide. Required additional phos and K replacement after discharge from  Doxil/Ifos. Lytes have remained stable/normal throughout admission thus far while on daily supplements.   - Follow Phos and K daily while inpatient  - Continue PTA PO Phos and KCl   - Replete additional per standing protocol     GI  # Transaminitis, stable  First noted in late Nov: , , TBili WNL on 11/29/21. PTA statin was subsequently held. Per outpatient notes, he did not have any abdominal pain or other symptoms concerning for viral hepatitis. Discussed with Dr. Wolff, and it was discussed that this could be due to his statin and OK to proceed with chemotherapy as planned above. He also has known liver metastases which are likely contributing.   - Continue to hold PTA statin  - Follow LFTs daily     # History of chemotherapy-induced nausea  Improved C2 with the following regimen, plan to continue this admission. Per patient, nausea typically \"kicks in\" around day 3 or 4.   - Scheduled Zofran Q8H  - PRN compazine and Ativan  - Emend 150 mg IV x1 on D1 and D6 of chemo regimen  - Zyprexa 5 mg at bedtime which has been helpful in past      # GERD  - Continue PTA omeprazole  - TUMS and Pepto Bismol available PRN      CV  # CAD   Followed by Dr. Gigi Vernon at [facility]. He was noted to have coronary calcium on chest CT. CTA 9/29/21 with moderate mid LAD " "stenosis. Small to moderate caliber ramus branch with severe diffuse disease. Echo completed 9/29/21 with normal EF and no valvular dysfunction. Repeat ECHO on 10/27 with EF 60-65%, early diastolic dysfunction but otherwise no change from previous.  - PTA rosuvastatin 40 mg daily HELD for transaminitis  - Encourage outpatient cardiology follow-up as recommended     # H/o intermittent elevated blood pressures  BPs ranging from normotensive to hypertensive (with SBP 140s-160) during previous admissions. He states that he runs higher at baseline (though this is based on clinic assessments as he does not typically monitor his BP at home). Appears recent intermittent clinic BPs sit at 140s-150s/80s-90s when seen in person, otherwise many of the visits are currently virtual. He is not on any antihypertensive medications PTA. Suspect he has undiagnosed essential HTN. Will monitor closely this admission.    - Would recommend outpatient PCP follow-up for further discussion of BP monitoring and management.   - Oral Hydralazine available PRN (SBP >170, DBP >100)     ENDO  # Panhypopituitarism  # Macroadenoma of the pituitary gland  Diagnosed in 2010. Found to have a pituitary macroadenoma, 1.9 cm in largest dimension. Underwent hypophysectomy on 8/23/10. Subsequent presumptive pituitary deficiency, on empiric meds for adrenal insufficiency and thyroid. Previously followed by Dr. Bill Mccall; now has established care with Dr. Jamir Grant on 11/11/21.  - Continue PTA Levothyroxine  - Continue PTA Hydrocortisone 15 mg qAM and 10 mg qPM  - Continue endocrinology follow-up as scheduled (next scheduled for 5/16/22)     ENT  # L eye sty  Noted to inner lower L eyelid. Pt's wife brings it up. Pt states has gotten them before and \"typically resolve on their own\". This one appeared a couple of days ago. Currently not irritating or bothering pt. No visual changes.   - Could consider ophtho consult if worsening symptoms or " visual changes, however given is not currently bothersome to pt will monitor closely.   - Consider outpatient ophtho referral on discharge if appropriate, currently stable and not bothersome.     # Vocal cord dysfunction  Manifested as hoarseness. Seen by ENT (Dr. Kyree Bearden) on 6/21/21 and noted to have left true vocal fold motion impairment from mediastinal mass. Underwent injection of ProLaryn (filler/bulking agent) on 7/1/21, which reportedly has been minimally beneficial. Seen in follow-up by Dr. Bearden on 9/1/21 and noted to have minimal improvement; he was then referred to [facility] ([facility]).   - No acute inpatient management issues  - Continue outpatient ENT follow-up as previously scheduled (next scheduled for 3/10/22)     # Oral candidiasis  # H/o mucositis  Significant 2/2 Doxil. Noted after C1 chemo, then better following C2 dose-reduction. On admission patient reports thrush again starting a couple days prior to admission. Restarted Nystatin at home on 1/3. Initially denied any mouth sores though endorses poor taste and tongue coating. Possibly new developing mucositis lesions to R inner cheek on 1/6. Still with good PO intake.    - Continue salt/soda swishes  - Continue Nystatin QID with this cycle  - MMW, Doxepin available PRN      DERM  # H/o Hand/foot  2/2 Doxil. Quite bothersome with previous C2.   - Iced hands and mouth during Doxil infusion, tolerated well      # Rosacea  - Continue PTA metronidazole gel     PSYCH  # History of anxiety/depression  Situational, related to malaise/illness. Previously placed health psych referral.  - No acute inpatient management needs     FEN:  - Encouraged PO fluids, bolus PRN  - PRN lyte replacement  - RDAT     Prophy/Misc:  - VTE: ppx Lovenox 40mg daily during admission   - GI/PUD: PTA omeprazole, Tums PRN  - Bowels: Senna and Miralax PRN  - Activity: Consider consulting PT if indicated     Code: FULL  Disposition: Admitted to Oncology Team 3 service  "for scheduled chemotherapy 7-day stay. Anticipate discharge on 1/10 pending chemo timing and barring complications.    Follow-up: Follows with Dr. Wolff  - Twice weekly labs/infusion appts are scheduled at [facility] hospital follow-up visit is scheduled on 1/12  - Neulasta infusion added to already-scheduled infusion appt on 1/13 at [place]   - Have requested repeat scheduling of port placement, outpatient team aware and working on it prior to next cycle of chemo.       Patient and plan of care was discussed with attending physician Dr. Cheema.    Izabella Owens (Hoy), SHARON  Hematology/Oncology  Pager: [phone]    Interval History   NAEO. Continues to tolerate Ifosfamide well and without signs of reaction/neurotoxicity. Ifrah continues to deny nausea thus far though states it typically \"kicks in day 3 or 4\". Appetite a little less this morning though he is still going to order breakfast and eat what he can while he feels ok. Possibly feels a little more bloated today. Had a BM last night. Weight up 2 lbs, he is getting fluid with chemo. Reports good UOP. No change to LE edema. Feels thrush is a little better though still poor taste. Maybe developing a sore to inner right cheek though not affecting ability to eat.  All questions answered. Plan to proceed with chemo.     A comprehensive review of systems was obtained and is negative other than noted here or in the HPI.     Physical Exam   Temp: 97.1  F (36.2  C) Temp src: Oral BP: (!) 140/79 Pulse: 73   Resp: 18 SpO2: 99 % O2 Device: None (Room air)    Vitals:    01/04/22 1533 01/05/22 0828 01/06/22 0743   Weight: 68.3 kg (150 lb 9.2 oz) 68.3 kg (150 lb 8 oz) 69.2 kg (152 lb 9.6 oz)     Vital Signs with Ranges  Temp:  [96.1  F (35.6  C)-98  F (36.7  C)] 97.1  F (36.2  C)  Pulse:  [68-80] 73  Resp:  [18-20] 18  BP: (127-145)/(76-83) 140/79  SpO2:  [99 %-100 %] 99 %  I/O last 3 completed shifts:  In: 395.2 [I.V.:20; IV Piggyback:375.2]  Out: 2075 [Urine:2075]    General: Awake " and interactive. No acute distress. Pleasant male seen sitting upright in bed eating breakfast.   Skin: Warm, dry, and intact without rashes or lesions.   Head: Normocephalic and atraumatic.   HEENT: White/yellow coating present to tongue though a little less-so today. Possibly developing mucositis-lesion to inner R cheek with some redness. Small sty present to L inner/lower eyelid. No surrounding erythema or drainage.   Lymph: Neck supple. R eye twitch present.   Cardiac: Regular rate and rhythm.   Respiratory: No signs of respiratory distress or accessory muscle use. Lungs CTAB.  Abd: Soft, symmetric, and non-tender without distension. BS present and normoactive.   Extremities: 1+ pitting edema to bilateral LE above sock line, stable.   Neuro: A&Ox3 with normal speech. Memory and thought process preserved. CN II-XII grossly intact.   Psych: Appropriate mood and affect.     Medications     - MEDICATION INSTRUCTIONS -         celecoxib  200 mg Oral BID     Chemotherapy Infusing-Continuous Infusion   Does not apply Q8H     [START ON 1/11/2022] dextrose 5% water  10-20 mL Intracatheter Daily at 8 pm     [START ON 1/11/2022] dextrose 5% water  10-20 mL Intracatheter Daily at 8 pm     enoxaparin ANTICOAGULANT  40 mg Subcutaneous Q24H     [START ON 1/11/2022] filgrastim (NEUPOGEN/GRANIX) intravenous  5 mcg/kg (Treatment Plan Recorded) Intravenous Daily at 8 pm     heparin lock flush  5-20 mL Intracatheter Q24H     heparin lock flush  5-20 mL Intracatheter Q24H     hydrocortisone  10 mg Oral Daily     hydrocortisone  20 mg Oral QAM     ifosfamide (IFEX) infusion  1,350 mg/m2 (Treatment Plan Recorded) Intravenous Q24H     levothyroxine  75 mcg Oral QAM     [START ON 1/10/2022] mesna (MESNEX) infusion  1,350 mg/m2 (Treatment Plan Recorded) Intravenous Once     metroNIDAZOLE   Topical BID     nystatin  1,000,000 Units Oral 4x Daily     OLANZapine  5 mg Oral At Bedtime     ondansetron  8 mg Oral Q8H     pantoprazole  40 mg  Oral QAM AC     phosphorus tablet 250 mg  500 mg Oral BID     potassium chloride  20 mEq Oral Daily     sodium chloride (PF)  10-40 mL Intracatheter Q8H     sodium chloride (PF)  10-40 mL Intracatheter Q8H     sodium chloride (PF)  10-40 mL Intracatheter Q8H     cholecalciferol  25 mcg Oral Daily       Data   Results for orders placed or performed during the hospital encounter of 01/04/22 (from the past 24 hour(s))   CBC with platelets differential    Narrative    The following orders were created for panel order CBC with platelets differential.  Procedure                               Abnormality         Status                     ---------                               -----------         ------                     CBC with platelets and d...[[id number]]  Abnormal            Final result                 Please view results for these tests on the individual orders.   Comprehensive metabolic panel   Result Value Ref Range    Sodium 142 133 - 144 mmol/L    Potassium 3.6 3.4 - 5.3 mmol/L    Chloride 109 94 - 109 mmol/L    Carbon Dioxide (CO2) 26 20 - 32 mmol/L    Anion Gap 7 3 - 14 mmol/L    Urea Nitrogen 14 7 - 30 mg/dL    Creatinine 0.80 0.66 - 1.25 mg/dL    Calcium 8.8 8.5 - 10.1 mg/dL    Glucose 95 70 - 99 mg/dL    Alkaline Phosphatase 173 (H) 40 - 150 U/L    AST 14 0 - 45 U/L    ALT 22 0 - 70 U/L    Protein Total 6.2 (L) 6.8 - 8.8 g/dL    Albumin 2.7 (L) 3.4 - 5.0 g/dL    Bilirubin Total 0.4 0.2 - 1.3 mg/dL    GFR Estimate >90 >60 mL/min/1.73m2   Magnesium   Result Value Ref Range    Magnesium 2.2 1.6 - 2.3 mg/dL   Phosphorus   Result Value Ref Range    Phosphorus 3.0 2.5 - 4.5 mg/dL   CBC with platelets and differential   Result Value Ref Range    WBC Count 10.5 4.0 - 11.0 10e3/uL    RBC Count 3.38 (L) 4.40 - 5.90 10e6/uL    Hemoglobin 9.8 (L) 13.3 - 17.7 g/dL    Hematocrit 31.5 (L) 40.0 - 53.0 %    MCV 93 78 - 100 fL    MCH 29.0 26.5 - 33.0 pg    MCHC 31.1 (L) 31.5 - 36.5 g/dL    RDW 19.5 (H) 10.0 - 15.0 %     Platelet Count 289 150 - 450 10e3/uL    % Neutrophils 66 %    % Lymphocytes 17 %    % Monocytes 13 %    % Eosinophils 1 %    % Basophils 1 %    % Immature Granulocytes 2 %    NRBCs per 100 WBC 0 <1 /100    Absolute Neutrophils 7.0 1.6 - 8.3 10e3/uL    Absolute Lymphocytes 1.7 0.8 - 5.3 10e3/uL    Absolute Monocytes 1.4 (H) 0.0 - 1.3 10e3/uL    Absolute Eosinophils 0.1 0.0 - 0.7 10e3/uL    Absolute Basophils 0.1 0.0 - 0.2 10e3/uL    Absolute Immature Granulocytes 0.2 <=0.4 10e3/uL    Absolute NRBCs 0.0 10e3/uL

## 2022-01-07 ENCOUNTER — VIRTUAL VISIT (OUTPATIENT)
Dept: OTOLARYNGOLOGY | Facility: CLINIC | Age: 72
End: 2022-01-07
Payer: MEDICARE

## 2022-01-07 DIAGNOSIS — R49.0 DYSPHONIA: Primary | ICD-10-CM

## 2022-01-07 DIAGNOSIS — J38.01 VOCAL FOLD PARALYSIS, LEFT: ICD-10-CM

## 2022-01-07 LAB
ALBUMIN SERPL-MCNC: 2.4 G/DL (ref 3.4–5)
ALP SERPL-CCNC: 155 U/L (ref 40–150)
ALT SERPL W P-5'-P-CCNC: 20 U/L (ref 0–70)
ANION GAP SERPL CALCULATED.3IONS-SCNC: 3 MMOL/L (ref 3–14)
AST SERPL W P-5'-P-CCNC: 12 U/L (ref 0–45)
BASOPHILS # BLD AUTO: 0.1 10E3/UL (ref 0–0.2)
BASOPHILS NFR BLD AUTO: 1 %
BILIRUB SERPL-MCNC: 0.2 MG/DL (ref 0.2–1.3)
BUN SERPL-MCNC: 15 MG/DL (ref 7–30)
CALCIUM SERPL-MCNC: 8.8 MG/DL (ref 8.5–10.1)
CHLORIDE BLD-SCNC: 110 MMOL/L (ref 94–109)
CO2 SERPL-SCNC: 30 MMOL/L (ref 20–32)
CREAT SERPL-MCNC: 0.85 MG/DL (ref 0.66–1.25)
EOSINOPHIL # BLD AUTO: 0.1 10E3/UL (ref 0–0.7)
EOSINOPHIL NFR BLD AUTO: 1 %
ERYTHROCYTE [DISTWIDTH] IN BLOOD BY AUTOMATED COUNT: 19.6 % (ref 10–15)
GFR SERPL CREATININE-BSD FRML MDRD: >90 ML/MIN/1.73M2
GLUCOSE BLD-MCNC: 77 MG/DL (ref 70–99)
HCT VFR BLD AUTO: 29.2 % (ref 40–53)
HGB BLD-MCNC: 8.9 G/DL (ref 13.3–17.7)
IMM GRANULOCYTES # BLD: 0.2 10E3/UL
IMM GRANULOCYTES NFR BLD: 2 %
LYMPHOCYTES # BLD AUTO: 1.3 10E3/UL (ref 0.8–5.3)
LYMPHOCYTES NFR BLD AUTO: 15 %
MAGNESIUM SERPL-MCNC: 2.2 MG/DL (ref 1.6–2.3)
MCH RBC QN AUTO: 28.3 PG (ref 26.5–33)
MCHC RBC AUTO-ENTMCNC: 30.5 G/DL (ref 31.5–36.5)
MCV RBC AUTO: 93 FL (ref 78–100)
MONOCYTES # BLD AUTO: 1.3 10E3/UL (ref 0–1.3)
MONOCYTES NFR BLD AUTO: 15 %
NEUTROPHILS # BLD AUTO: 5.5 10E3/UL (ref 1.6–8.3)
NEUTROPHILS NFR BLD AUTO: 66 %
NRBC # BLD AUTO: 0 10E3/UL
NRBC BLD AUTO-RTO: 0 /100
PHOSPHATE SERPL-MCNC: 3.1 MG/DL (ref 2.5–4.5)
PLATELET # BLD AUTO: 225 10E3/UL (ref 150–450)
POTASSIUM BLD-SCNC: 3.6 MMOL/L (ref 3.4–5.3)
PROT SERPL-MCNC: 5.6 G/DL (ref 6.8–8.8)
RBC # BLD AUTO: 3.14 10E6/UL (ref 4.4–5.9)
SODIUM SERPL-SCNC: 143 MMOL/L (ref 133–144)
WBC # BLD AUTO: 8.4 10E3/UL (ref 4–11)

## 2022-01-07 PROCEDURE — 250N000011 HC RX IP 250 OP 636: Performed by: INTERNAL MEDICINE

## 2022-01-07 PROCEDURE — 84100 ASSAY OF PHOSPHORUS: CPT | Performed by: PHYSICIAN ASSISTANT

## 2022-01-07 PROCEDURE — 92507 TX SP LANG VOICE COMM INDIV: CPT | Mod: 95 | Performed by: SPEECH-LANGUAGE PATHOLOGIST

## 2022-01-07 PROCEDURE — 258N000003 HC RX IP 258 OP 636: Performed by: PHYSICIAN ASSISTANT

## 2022-01-07 PROCEDURE — 36592 COLLECT BLOOD FROM PICC: CPT | Performed by: PHYSICIAN ASSISTANT

## 2022-01-07 PROCEDURE — 250N000011 HC RX IP 250 OP 636: Performed by: PHYSICIAN ASSISTANT

## 2022-01-07 PROCEDURE — 83735 ASSAY OF MAGNESIUM: CPT | Performed by: PHYSICIAN ASSISTANT

## 2022-01-07 PROCEDURE — 120N000002 HC R&B MED SURG/OB UMMC

## 2022-01-07 PROCEDURE — 85025 COMPLETE CBC W/AUTO DIFF WBC: CPT | Performed by: PHYSICIAN ASSISTANT

## 2022-01-07 PROCEDURE — 250N000013 HC RX MED GY IP 250 OP 250 PS 637: Performed by: PHYSICIAN ASSISTANT

## 2022-01-07 PROCEDURE — 250N000013 HC RX MED GY IP 250 OP 250 PS 637: Performed by: INTERNAL MEDICINE

## 2022-01-07 PROCEDURE — 99233 SBSQ HOSP IP/OBS HIGH 50: CPT | Mod: FS | Performed by: INTERNAL MEDICINE

## 2022-01-07 PROCEDURE — 80053 COMPREHEN METABOLIC PANEL: CPT | Performed by: PHYSICIAN ASSISTANT

## 2022-01-07 RX ADMIN — METRONIDAZOLE: 7.5 GEL TOPICAL at 20:05

## 2022-01-07 RX ADMIN — ONDANSETRON HYDROCHLORIDE 8 MG: 8 TABLET, FILM COATED ORAL at 05:05

## 2022-01-07 RX ADMIN — NYSTATIN 1000000 UNITS: 100000 SUSPENSION ORAL at 13:21

## 2022-01-07 RX ADMIN — PROCHLORPERAZINE MALEATE 5 MG: 5 TABLET ORAL at 09:21

## 2022-01-07 RX ADMIN — METRONIDAZOLE: 7.5 GEL TOPICAL at 09:16

## 2022-01-07 RX ADMIN — POTASSIUM CHLORIDE 20 MEQ: 750 TABLET, EXTENDED RELEASE ORAL at 09:15

## 2022-01-07 RX ADMIN — PANTOPRAZOLE SODIUM 40 MG: 40 TABLET, DELAYED RELEASE ORAL at 09:15

## 2022-01-07 RX ADMIN — NYSTATIN 1000000 UNITS: 100000 SUSPENSION ORAL at 18:05

## 2022-01-07 RX ADMIN — ENOXAPARIN SODIUM 40 MG: 40 INJECTION SUBCUTANEOUS at 20:03

## 2022-01-07 RX ADMIN — MESNA 2510 MG: 100 INJECTION, SOLUTION INTRAVENOUS at 22:11

## 2022-01-07 RX ADMIN — LEVOTHYROXINE SODIUM 75 MCG: 0.05 TABLET ORAL at 05:58

## 2022-01-07 RX ADMIN — HYDROCORTISONE 10 MG: 10 TABLET ORAL at 18:05

## 2022-01-07 RX ADMIN — OLANZAPINE 5 MG: 5 TABLET, FILM COATED ORAL at 23:43

## 2022-01-07 RX ADMIN — PROCHLORPERAZINE MALEATE 5 MG: 5 TABLET ORAL at 15:41

## 2022-01-07 RX ADMIN — DIBASIC SODIUM PHOSPHATE, MONOBASIC POTASSIUM PHOSPHATE AND MONOBASIC SODIUM PHOSPHATE 500 MG: 852; 155; 130 TABLET ORAL at 18:05

## 2022-01-07 RX ADMIN — CHOLECALCIFEROL TAB 25 MCG (1000 UNIT) 25 MCG: 25 TAB at 09:15

## 2022-01-07 RX ADMIN — CELECOXIB 200 MG: 200 CAPSULE ORAL at 09:15

## 2022-01-07 RX ADMIN — DIBASIC SODIUM PHOSPHATE, MONOBASIC POTASSIUM PHOSPHATE AND MONOBASIC SODIUM PHOSPHATE 500 MG: 852; 155; 130 TABLET ORAL at 05:58

## 2022-01-07 RX ADMIN — HYDROCORTISONE 20 MG: 10 TABLET ORAL at 09:15

## 2022-01-07 RX ADMIN — CELECOXIB 200 MG: 200 CAPSULE ORAL at 20:03

## 2022-01-07 RX ADMIN — ONDANSETRON HYDROCHLORIDE 8 MG: 8 TABLET, FILM COATED ORAL at 13:21

## 2022-01-07 RX ADMIN — ONDANSETRON HYDROCHLORIDE 8 MG: 8 TABLET, FILM COATED ORAL at 20:04

## 2022-01-07 RX ADMIN — NYSTATIN 1000000 UNITS: 100000 SUSPENSION ORAL at 20:03

## 2022-01-07 RX ADMIN — NYSTATIN 1000000 UNITS: 100000 SUSPENSION ORAL at 09:15

## 2022-01-07 ASSESSMENT — ACTIVITIES OF DAILY LIVING (ADL)
ADLS_ACUITY_SCORE: 6

## 2022-01-07 ASSESSMENT — MIFFLIN-ST. JEOR: SCORE: 1398.99

## 2022-01-07 NOTE — PLAN OF CARE
/78. OVSS. Denies SOB or pain. C/o mild nausea. Nausea well controlled on scheduled antiemetics. Day #3 Ifosfamide mesna infusing. Tolerating infusion well. He reports rare twitches in right arm. Voiding spontaneously.  Urine light yellow/pale. Sleeping in between cares. Will continue to monitor pt for neurotoxicity, hematuria and other possible adverse effects of chemotherapy.

## 2022-01-07 NOTE — PROGRESS NOTES
Nursing Focus: Chemotherapy  D: Positive blood return via PICC. Insertion site is clean/dry/intact, dressing intact with no complaints of pain.  Urine output is recorded in intake in Doc Flowsheet.    I: Premedications given per order (see electronic medical administration record). Dose #3 Ifos/Mesna started to infuse over 24 hours. Reviewed pt teaching on chemotherapy side effects.  Pt denies need for further teaching. Chemotherapy double checked per protocol by two chemotherapy competent RN's.   A: Tolerating procedure well. Denies nausea and or pain.   P: Continue to monitor urine output and symptoms of nausea. Screen for symptoms of toxicity.

## 2022-01-07 NOTE — PLAN OF CARE
/78, OVSS, C/o nausea, no emesis, prn oral compazine x1. No c/o pain. Up walking in halls. Good po intake. Adequate urine output. LBM 1/6. D3 CIVI Ifosfamide and Mesna hung with brisk blood return noted from PICC q4 hours.

## 2022-01-07 NOTE — PROGRESS NOTES
Ifrah Huitron is a 71 year old male who is being evaluated via a billable video visit.      Ifrah has been notified and verbally consented to the following:     This video visit will be conducted between you and your provider.    Patient has opted to conduct today's video visit vs an in-person appointment.     Video visits are billed at different rates depending on your insurance coverage. Please reach out to your insurance provider with any questions.     If during the course of the call the provider feels the appointment is not appropriate, you will not be charged for this service.  Provider has received verbal consent for billable virtual visit from the patient? Yes  Will anyone else be joining your video visit? No    Call initiated at: 8:30 AM   Type of Visit Platform Used: [facility] Video  Location of provider: Home  Location of patient: in the hospital, during chemo  treatment    [facility] VOICE [facility]  Baldomero Boswell Jr., M.D., F.A.C.S.  Veronique Forbes M.D., M.P.H.  Sherry Esquivel M.D.  Tasha Bowie, Ph.D., CCC-SLP  Neil Smith, Ph.D., [facility]-SLP  Janie Madrigal M.M. (voice), M.A., CCC-SLP  Danilo Lu M.M. (voice), M.A., CCC-SLP  CHERELLE Kraft (voice), M.S., CCC-SLP    [facility]  VOICE/SPEECH/BREATHING THERAPY PROGRESS REPORT    Patient: Ifrah Huitron  Date of Service: 1/7/2022    Date of Last Service: 12/9/21  Referring physician: Dr. Esquivel  Initial evaluation: 12/9/21    I had the pleasure of seeing Mr. 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 Huitron was seen for evaluation on 12/9/21.  At that time, it was determined that he would benefit from a course of speech therapy to address the above diagnosis.  No therapeutic suggestions were made at the time.    Mr. Huitron also states that:    He is continuing to get better, not markedly, but some improvement    People say they  "can \"elvis hear my old voice\"    He is running out of breath much less    He is eager to practice exercises to help him find his best voice    Mr. Huitron presents today with the following:  Voice quality:    The same inconsistent mild roughness he had previously, but less asthenic, and fewer instances of diplophonia    Pitch and volume are appropriate    THERAPEUTIC ACTIVITIES  Today Mr. Huitron participated in the following therapeutic activities:    Briefly learned techniques for optimal respiratory mechanics for speech.  o with guidance, demonstrated adequate abdominal relaxation for inhalation    [place] exercises to add phonation to the optimal flowing airstream.  o Semi-occluded vocal tract exercises using nasal continuants were most facilitating  o [place] to concentrate on taking adequate breaths often enough, and use the airflow to support his tone  o Also concentrated on sensation of forward focus  o progressed from neutral syllables to sentences    [place] exercises for improved glottic closure.  o Voiced bilabial plosives were helpful in providing a clean, clear quality, with a sensation of forward focus and no neck tension    [place] techniques to use an optimal pitch range in speech.  o today s pitch range: B2 to D#3  o able to maintain in chant mode, in the above sentence exercises (both M-sentences and B-sentences)    [place] concepts of an optimal regimen for practice.  o he should use an interval schedule of practice, with brief periods of practice frequently throughout each day    I provided an after visit summary to help facilitate practice.    IMPRESSIONS/GOALS/PLAN  Mr. Huitron had a productive session of speech therapy today, to address the following:  Dysphonia (R49.0)   Vocal Fold Paralysis - Unilateral left  (J38.01)   Speech therapy for him is medically necessary to allow  him to meet personal and professional demands and fully engage in activities of daily living.     He will continue to " work on his exercises on a daily basis, and work on incorporating the techniques into his daily activities.    Goals for this practice period:     practice all exercises according to instructions    Plan: I will see Mr. Huitron in three weeks to work on education, modification, and carryover of therapeutic activities to more complex activities.    Reporting period 12/9/21 - 3/9/22      TOTAL SERVICE TIME: 32 minutes  TREATMENT ([id number])  NO CHARGE FACILITY FEE    Tasha Bowie, Ph.D., [facility]-SLP  Speech-Language Pathologist  Director, [facility]  [phone]

## 2022-01-07 NOTE — PLAN OF CARE
"0700 - 1900:   BP (!) 150/86 (BP Location: Left arm)   Pulse 67   Temp (!) 96.1  F (35.6  C) (Oral)   Resp 18   Ht 1.702 m (5' 7\")   Wt 69.5 kg (153 lb 4.8 oz)   SpO2 100%   BMI 24.01 kg/m      CIVI D#3 Ifos/Mesna infusing via picc, good blood return, tolerating well thus far.  AVSS, c/o intermittent nausea, PRN compazine x 2 & on scheduled zofran, denies pain/sob, minimal appetite.  Up and been walking in hallway, good UOP, last bm 01/06.  Continue with poc...  "

## 2022-01-07 NOTE — PROGRESS NOTES
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 Thrush noted on admission, continues on Nystatin. Antiemetics plan in place.     TODAY: D3 Doxil/Ifos  - Proceed with protocol-directed chemo, no signs/symptoms of neurotoxicity thus far.   - More nausea starting yesterday, continue supportive cares below:   - S/p D1 Emend. Could consider repeating tomorrow D4.   - Continue scheduled Zofran and Zyprexa HS. PRN Compazine/Ativan.   - Continue mouth cares -- Nystatin, s/s swish and MMW prn   - Lytes have been stable/wnl, follow daily. Continue PTA Phos and KCl supplements.   - Oral Hydralazine available PRN hypertension, parameters in place  - Daily weights, I/Os. Weight is slowly trending up. Pt does have 1+ bilateral pitting edema above sock line. He continues to endorse good urine output though does feel a bit bloating. Could consider Lasix in coming days if worsening concern for hypervolemia.       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 Wolff. Patient presented to his PCP in 03/2021 with chest/left shoulder and back pain that led to imaging which revealed multiple lung mets, included a left pleural mass. There were difficulties in getting diagnostic biopsy; eventually, biopsy of the mediastinal mass (5/20/21) revealed a poorly characterized malignant spindle cell neoplasm with high PD-L1 expression (90%), Ki-67 70%. 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 Insufficient material to send [facility]. Baseline  imaging (6/21/21) revealed progression of lung mets and left-sided subdiaphragmatic mass, stable liver lesions. He was seen by ENT for hoarseness, which was attributed to left true vocal fold motion impairment from mediastinal mass. Given high PD-L1 expression, he was started on Keytruda (C1=6/21/21). Interval imaging (CT 8/2/21) revealed positive response to treatment. He received 6 cycles total, most recently C6=10/4/21. Unfortunately, restaging imaging (10/18/21) revealed interval increase in size of the LUQ/splenic mass; however, the mediastinal and left pleural mass were stable to slightly decreased in size. He met with Dr. Wolff, who recommended a change in therapy to Doxil/ifosfamide. He was admitted on 10/26/21 to receive Cycle #1 of Doxil/ifos which he tolerated reasonably well with no neurotoxicities, but did have moderate chemotherapy-induced nausea. After discharge he had significant mucositis, poor PO intake, nausea, and lyte derangements. Given adverse side effects, Cycle 2 was delayed by 1 week and doxil was dose reduced to 70mg (previously given 80mg, ie 45mg/m2), Ifosfamide reduced by 10% (1500mg/m2 ? 1350mg/m2).   - Tolerated Cycle 2 Doxil/Ifos (D1=12/1/21) better, so plan to proceed with same dosing for cycle 3.   - PICC ordered on admission, remove at discharge. Port placement previously scheduled for 1/5/22 though canceled d/t this admission. Will need to rechedule port before Cycle 4. I have requested and outpatient order placed. Outpatient team working to arrange prior to next cycle.   - Outpatient team plans to restage with repeat imaging after Cycle 4.                                            Treatment Plan: Doxil + ifosfamide (C3D1=1/4/22).               Doxil 70mg IV once - D1                Ifosfamide/Mesna 1350 mg/m2 (2510 mg) CIVI - D1-6                Mesna 1350 mg/m2 (2510 mg) IV over 18 hrs - starting D7                Pre-meds: Emend 150 mg once Day 1 and Day 6; Zofran 8 mg  "Q8H                Neulasta injection - D8, scheduled at [facility] on 1/13     # Cancer-related pain  - Continue PTA Celebrex 200 mg BID   - Continue PTA topical Bengay PRN at bedtime to left back      # Normocytic anemia  Noted intermittently. Likely related to chemo and underlying disease.   - Transfuse to maintain Hgb >7, will sign blood consent if indicated     CV  # CAD   Followed by Dr. Gigi Vernon at [facility]. He was noted to have coronary calcium on chest CT. CTA 9/29/21 with moderate mid LAD stenosis. Small to moderate caliber ramus branch with severe diffuse disease. Echo completed 9/29/21 with normal EF and no valvular dysfunction. Repeat ECHO on 10/27 with EF 60-65%, early diastolic dysfunction but otherwise no change from previous.  - PTA rosuvastatin 40 mg daily HELD for transaminitis  - Encourage outpatient cardiology follow-up as recommended     # H/o intermittent elevated blood pressures  BPs ranging from normotensive to hypertensive (with SBP 140s-160) during previous admissions. He states that he runs higher at baseline (though this is based on clinic assessments as he does not typically monitor his BP at home). Appears recent intermittent clinic BPs sit at 140s-150s/80s-90s when seen in person, otherwise many of the visits are currently virtual. He is not on any antihypertensive medications PTA. Suspect he has undiagnosed essential HTN. Will monitor closely this admission.    - Would recommend outpatient PCP follow-up for further discussion of BP monitoring and management.   - Oral Hydralazine available PRN (SBP >170, DBP >100)    GI  # Chemotherapy-induced nausea  Improved C2 with the following regimen, plan to continue this admission. Per patient, nausea typically \"kicks in\" around day 3 or 4.   - Scheduled Zofran Q8H  - PRN compazine and Ativan  - Emend 150 mg IV x1 on D1; consider repeating D4 if nausea persists  - Zyprexa 5 mg at bedtime which has been helpful in past     # " Transaminitis, stable  First noted in late Nov: , , TBili WNL on 11/29/21. PTA statin was subsequently held. Per outpatient notes, he did not have any abdominal pain or other symptoms concerning for viral hepatitis. Discussed with Dr. Wolff, and it was discussed that this could be due to his statin and OK to proceed with chemotherapy as planned above. He also has known liver metastases which are likely contributing.   - Continue to hold PTA statin  - Follow LFTs daily     # GERD  - Continue PTA omeprazole  - TUMS and Pepto Bismol available PRN     RENAL/FEN  # Hypervolemia  Weight trending up throughout admission, likely in setting of fluids with chemo. He did present with bilateral LE edema, 1+ on admission. Has overall remained stable thus far though maybe slightly more on 1/7. Patient endorses bloated feeling to his abdomen. Has had good urine output. Furthermore BP has been elevated per below but this does not appear to be new.   - Has wanted to hold off on Lasix thus far though could consider with worsening  - Daily weights, I&Os    # H/o HUSSEIN, stable  Baseline Cr ~0.8 which is noted to transiently increased with previously chemo cycles, presumed d/t hypovolemia in the setting of nausea with poor PO intake. CR has remained at baseline throughout admission thus far.    - Continue with twice weekly IVF infusion appts OP  - Follow daily CMP  - No mIVF; bolus PRN.     # Hypophosphatemia, intermittent  # Hypokalemia, intermittent  Secondary to ifosfamide. Required additional phos and K replacement after discharge from C1 Doxil/Ifos. Lytes have remained stable/normal throughout admission thus far while on daily supplements.   - Follow Phos and K daily while inpatient  - Continue PTA PO Phos and KCl   - Replete additional per standing protocol     ENDO  # Panhypopituitarism  # Macroadenoma of the pituitary gland  Diagnosed in 2010. Found to have a pituitary macroadenoma, 1.9 cm in largest  "dimension. Underwent hypophysectomy on 8/23/10. Subsequent presumptive pituitary deficiency, on empiric meds for adrenal insufficiency and thyroid. Previously followed by Dr. Bill Mccall; now has established care with Dr. Jamir Grant on 11/11/21.  - Continue PTA Levothyroxine  - Continue PTA Hydrocortisone 15 mg qAM and 10 mg qPM  - Continue endocrinology follow-up as scheduled (next scheduled for 5/16/22)     ENT  # L eye sty, stable  Noted to inner lower L eyelid. Pt's wife brings it up. Pt states has gotten them before and \"typically resolve on their own\". This one appeared a couple of days ago. Currently not irritating or bothering pt. No visual changes.   - Could consider ophtho consult if worsening symptoms or visual changes, however given is not currently bothersome to pt will monitor closely.   - Consider outpatient ophtho referral on discharge if appropriate, currently stable and not bothersome.     # Vocal cord dysfunction  Manifested as hoarseness. Seen by ENT (Dr. Kyree Bearden) on 6/21/21 and noted to have left true vocal fold motion impairment from mediastinal mass. Underwent injection of ProLaryn (filler/bulking agent) on 7/1/21, which reportedly has been minimally beneficial. Seen in follow-up by Dr. Bearden on 9/1/21 and noted to have minimal improvement; he was then referred to [facility]).   - No acute inpatient management issues  - Continue outpatient ENT follow-up as previously scheduled (next scheduled for 3/10/22)     # Oral candidiasis  # H/o mucositis  Significant 2/2 Doxil. Noted after C1 chemo, then better following C2 dose-reduction. On admission patient reports thrush again starting a couple days prior to admission. Restarted Nystatin at home on 1/3. Initially denied any mouth sores though endorses poor taste and tongue coating. Possibly new developing mucositis lesions to R inner cheek on 1/6. Still with good PO intake.    - Continue salt/soda swishes  - Continue " Nystatin QID with this cycle  - MMW, Doxepin available PRN      DERM  # H/o Hand/foot  2/2 Doxil. Quite bothersome with previous C2.   - Iced hands and mouth during Doxil infusion, tolerated well      # Rosacea  - Continue PTA metronidazole gel     PSYCH  # History of anxiety/depression  Situational, related to malaise/illness. Previously placed health psych referral.  - No acute inpatient management needs     FEN:  - Encouraged PO fluids, bolus PRN  - PRN lyte replacement  - RDAT     Prophy/Misc:  - VTE: ppx Lovenox 40mg daily during admission   - GI/PUD: PTA omeprazole, Tums PRN  - Bowels: Senna and Miralax PRN  - Activity: Consider consulting PT if indicated     Code: FULL  Disposition: Admitted to Oncology Team 3 service for scheduled chemotherapy 7-day stay. Anticipate discharge ~1/11 pending chemo timing and barring complications.    Follow-up: Follows with Dr. Wolff  - Twice weekly labs/infusion appts are scheduled at [facility] follow-up visit is scheduled on 1/12  - Neulasta infusion added to already-scheduled infusion appt on 1/13 at [place]   - Have requested repeat scheduling of port placement, outpatient team aware and working on it prior to next cycle of chemo.       Patient and plan of care was discussed with attending physician Dr. Cheema.    Izabella Owens PA-C ([place])  Hematology/Oncology  Pager: [phone]    Interval History   Pt developed more nausea starting 1/6. Has had no emesis though appetite has been poor the past 24 hours. Tried eating some cream of wheat this morning but that's all he could tolerate. Resting in room trying to sleep. Was up walking this morning. Denies abdominal pain. Mouth feels fine and denies pain. Has been using mouth wash. No other new complaints today. Continues to feel a little bloated though reports LE swelling is stable and urine output is good. Weight is slowly trending up. Wants to continue to hold off on Lasix thus far. All questions answered.     A  comprehensive review of systems was obtained and is negative other than noted here or in the HPI.     Physical Exam   Temp: 96.8  F (36  C) Temp src: Oral BP: 132/77 Pulse: 79   Resp: 16 SpO2: 99 % O2 Device: None (Room air)    Vitals:    01/05/22 0828 01/06/22 0743 01/07/22 0700   Weight: 68.3 kg (150 lb 8 oz) 69.2 kg (152 lb 9.6 oz) 69.5 kg (153 lb 4.8 oz)     Vital Signs with Ranges  Temp:  [96.8  F (36  C)-97.9  F (36.6  C)] 96.8  F (36  C)  Pulse:  [71-82] 79  Resp:  [16-18] 16  BP: (132-148)/(74-83) 132/77  SpO2:  [97 %-100 %] 99 %  I/O last 3 completed shifts:  In: 1230.4 [P.O.:480; IV Piggyback:750.4]  Out: 2350 [Urine:2350]    General: Awake and interactive though more fatigued today. No acute distress. Pleasant male seen resting reclined in bed trying to nap.    Skin: Warm, dry, and intact without rashes or lesions.   Head: Normocephalic and atraumatic.   HEENT: White/yellow coating present to tongue though a little less-so today. Possibly developing mucositis-lesion to inner R cheek with some redness though appears improved today. Small sty present to L inner/lower eyelid. No surrounding erythema or drainage. R eye twitch present.   Lymph: Neck supple.   Cardiac: Regular rate and rhythm.   Respiratory: No signs of respiratory distress or accessory muscle use. Lungs CTAB.  Abd: Soft, symmetric, and non-tender without distension. BS present and normoactive.   Extremities: 1+ pitting edema to bilateral LE above sock line, stable.   Neuro: A&Ox3 with normal speech. Memory and thought process preserved. CN II-XII grossly intact.   Psych: Appropriate mood and affect.     Medications     - MEDICATION INSTRUCTIONS -         celecoxib  200 mg Oral BID     Chemotherapy Infusing-Continuous Infusion   Does not apply Q8H     [START ON 1/11/2022] dextrose 5% water  10-20 mL Intracatheter Daily at 8 pm     [START ON 1/11/2022] dextrose 5% water  10-20 mL Intracatheter Daily at 8 pm     enoxaparin ANTICOAGULANT  40 mg  Subcutaneous Q24H     [START ON 1/11/2022] filgrastim (NEUPOGEN/GRANIX) intravenous  5 mcg/kg (Treatment Plan Recorded) Intravenous Daily at 8 pm     heparin lock flush  5-20 mL Intracatheter Q24H     heparin lock flush  5-20 mL Intracatheter Q24H     hydrocortisone  10 mg Oral Daily     hydrocortisone  20 mg Oral QAM     ifosfamide (IFEX) infusion  1,350 mg/m2 (Treatment Plan Recorded) Intravenous Q24H     levothyroxine  75 mcg Oral QAM     [START ON 1/10/2022] mesna (MESNEX) infusion  1,350 mg/m2 (Treatment Plan Recorded) Intravenous Once     metroNIDAZOLE   Topical BID     nystatin  1,000,000 Units Oral 4x Daily     OLANZapine  5 mg Oral At Bedtime     ondansetron  8 mg Oral Q8H     pantoprazole  40 mg Oral QAM AC     phosphorus tablet 250 mg  500 mg Oral BID     potassium chloride  20 mEq Oral Daily     sodium chloride (PF)  10-40 mL Intracatheter Q8H     sodium chloride (PF)  10-40 mL Intracatheter Q8H     sodium chloride (PF)  10-40 mL Intracatheter Q8H     cholecalciferol  25 mcg Oral Daily       Data   Results for orders placed or performed during the hospital encounter of 01/04/22 (from the past 24 hour(s))   CBC with platelets differential    Narrative    The following orders were created for panel order CBC with platelets differential.  Procedure                               Abnormality         Status                     ---------                               -----------         ------                     CBC with platelets and d...[[id number]]  Abnormal            Final result                 Please view results for these tests on the individual orders.   Comprehensive metabolic panel   Result Value Ref Range    Sodium 143 133 - 144 mmol/L    Potassium 3.6 3.4 - 5.3 mmol/L    Chloride 110 (H) 94 - 109 mmol/L    Carbon Dioxide (CO2) 30 20 - 32 mmol/L    Anion Gap 3 3 - 14 mmol/L    Urea Nitrogen 15 7 - 30 mg/dL    Creatinine 0.85 0.66 - 1.25 mg/dL    Calcium 8.8 8.5 - 10.1 mg/dL    Glucose 77 70 - 99  mg/dL    Alkaline Phosphatase 155 (H) 40 - 150 U/L    AST 12 0 - 45 U/L    ALT 20 0 - 70 U/L    Protein Total 5.6 (L) 6.8 - 8.8 g/dL    Albumin 2.4 (L) 3.4 - 5.0 g/dL    Bilirubin Total 0.2 0.2 - 1.3 mg/dL    GFR Estimate >90 >60 mL/min/1.73m2   Magnesium   Result Value Ref Range    Magnesium 2.2 1.6 - 2.3 mg/dL   Phosphorus   Result Value Ref Range    Phosphorus 3.1 2.5 - 4.5 mg/dL   CBC with platelets and differential   Result Value Ref Range    WBC Count 8.4 4.0 - 11.0 10e3/uL    RBC Count 3.14 (L) 4.40 - 5.90 10e6/uL    Hemoglobin 8.9 (L) 13.3 - 17.7 g/dL    Hematocrit 29.2 (L) 40.0 - 53.0 %    MCV 93 78 - 100 fL    MCH 28.3 26.5 - 33.0 pg    MCHC 30.5 (L) 31.5 - 36.5 g/dL    RDW 19.6 (H) 10.0 - 15.0 %    Platelet Count 225 150 - 450 10e3/uL    % Neutrophils 66 %    % Lymphocytes 15 %    % Monocytes 15 %    % Eosinophils 1 %    % Basophils 1 %    % Immature Granulocytes 2 %    NRBCs per 100 WBC 0 <1 /100    Absolute Neutrophils 5.5 1.6 - 8.3 10e3/uL    Absolute Lymphocytes 1.3 0.8 - 5.3 10e3/uL    Absolute Monocytes 1.3 0.0 - 1.3 10e3/uL    Absolute Eosinophils 0.1 0.0 - 0.7 10e3/uL    Absolute Basophils 0.1 0.0 - 0.2 10e3/uL    Absolute Immature Granulocytes 0.2 <=0.4 10e3/uL    Absolute NRBCs 0.0 10e3/uL

## 2022-01-07 NOTE — LETTER
1/7/2022       RE: Ifrah Huitron  [address] [place] [address]     Dear Colleague,    Thank you for referring your patient, Ifrah Huitron, to the [facility]ICE [facility] [place] [facility]. Please see a copy of my visit note below.    Ifrah Huitron is a 71 year old male who is being evaluated via a billable video visit.      Ifrah has been notified and verbally consented to the following:     This video visit will be conducted between you and your provider.    Patient has opted to conduct today's video visit vs an in-person appointment.     Video visits are billed at different rates depending on your insurance coverage. Please reach out to your insurance provider with any questions.     If during the course of the call the provider feels the appointment is not appropriate, you will not be charged for this service.  Provider has received verbal consent for billable virtual visit from the patient? Yes  Will anyone else be joining your video visit? No    Call initiated at: 8:30 AM   Type of Visit Platform Used: i[employer] Video  Location of provider: Home  Location of patient: in the hospital, during chemo  treatment    [facility] VOICE CL[facility]  Baldomero Boswell Jr., M.D., F.A.C.S.  Veronique Forbes M.D., M.P.H.  Sherry Esquivel M.D.  Tasha Bowie, Ph.D., CCC-SLP  Neil Smith, Ph.D., CCC-SLP  Janie Madrigal M.M. (voice), M.A., CCC-SLP  Danilo Lu M.M. (voice), M.A., CCC-SLP  CHERELLE Kraft (voice), M.S., CCC-SLP    [facility] VOICE [facility]  VOICE/SPEECH/BREATHING THERAPY PROGRESS REPORT    Patient: Ifrah Huitron  Date of Service: 1/7/2022    Date of Last Service: 12/9/21  Referring physician: Dr. Esquivel  Initial evaluation: 12/9/21    I had the pleasure of seeing Mr. 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 Huitron was seen for  "evaluation on 12/9/21.  At that time, it was determined that he would benefit from a course of speech therapy to address the above diagnosis.  No therapeutic suggestions were made at the time.    Mr. Huitron also states that:    He is continuing to get better, not markedly, but some improvement    People say they can \"elvis hear my old voice\"    He is running out of breath much less    He is eager to practice exercises to help him find his best voice    Mr. Huitron presents today with the following:  Voice quality:    The same inconsistent mild roughness he had previously, but less asthenic, and fewer instances of diplophonia    Pitch and volume are appropriate    THERAPEUTIC ACTIVITIES  Today Mr. Huitron participated in the following therapeutic activities:    Briefly learned techniques for optimal respiratory mechanics for speech.  o with guidance, demonstrated adequate abdominal relaxation for inhalation    Milam exercises to add phonation to the optimal flowing airstream.  o Semi-occluded vocal tract exercises using nasal continuants were most facilitating  o Milam to concentrate on taking adequate breaths often enough, and use the airflow to support his tone  o Also concentrated on sensation of forward focus  o progressed from neutral syllables to sentences    Milam exercises for improved glottic closure.  o Voiced bilabial plosives were helpful in providing a clean, clear quality, with a sensation of forward focus and no neck tension    Milam techniques to use an optimal pitch range in speech.  o today s pitch range: B2 to D#3  o able to maintain in chant mode, in the above sentence exercises (both M-sentences and B-sentences)    Milam concepts of an optimal regimen for practice.  o he should use an interval schedule of practice, with brief periods of practice frequently throughout each day    I provided an after visit summary to help facilitate practice.    IMPRESSIONS/GOALS/PLAN  Mr. Huitron had a " productive session of speech therapy today, to address the following:  Dysphonia (R49.0)   Vocal Fold Paralysis - Unilateral left  (J38.01)   Speech therapy for him is medically necessary to allow  him to meet personal and professional demands and fully engage in activities of daily living.     He will continue to work on his exercises on a daily basis, and work on incorporating the techniques into his daily activities.    Goals for this practice period:     practice all exercises according to instructions    Plan: I will see Mr. Huitron in three weeks to work on education, modification, and carryover of therapeutic activities to more complex activities.    Reporting period 12/9/21 - 3/9/22      TOTAL SERVICE TIME: 32 minutes  TREATMENT ([id number])  NO CHARGE FACILITY FEE    Tasha Bowie, Ph.D., [facility]-SLP  Speech-Language Pathologist  Director, [facility]  [phone]

## 2022-01-08 LAB
ALBUMIN SERPL-MCNC: 2.4 G/DL (ref 3.4–5)
ALP SERPL-CCNC: 153 U/L (ref 40–150)
ALT SERPL W P-5'-P-CCNC: 18 U/L (ref 0–70)
ANION GAP SERPL CALCULATED.3IONS-SCNC: 8 MMOL/L (ref 3–14)
AST SERPL W P-5'-P-CCNC: 13 U/L (ref 0–45)
BASOPHILS # BLD AUTO: 0 10E3/UL (ref 0–0.2)
BASOPHILS NFR BLD AUTO: 1 %
BILIRUB SERPL-MCNC: 0.3 MG/DL (ref 0.2–1.3)
BUN SERPL-MCNC: 16 MG/DL (ref 7–30)
CALCIUM SERPL-MCNC: 8.8 MG/DL (ref 8.5–10.1)
CHLORIDE BLD-SCNC: 111 MMOL/L (ref 94–109)
CO2 SERPL-SCNC: 25 MMOL/L (ref 20–32)
CREAT SERPL-MCNC: 0.76 MG/DL (ref 0.66–1.25)
EOSINOPHIL # BLD AUTO: 0.1 10E3/UL (ref 0–0.7)
EOSINOPHIL NFR BLD AUTO: 2 %
ERYTHROCYTE [DISTWIDTH] IN BLOOD BY AUTOMATED COUNT: 19.6 % (ref 10–15)
GFR SERPL CREATININE-BSD FRML MDRD: >90 ML/MIN/1.73M2
GLUCOSE BLD-MCNC: 104 MG/DL (ref 70–99)
HCT VFR BLD AUTO: 29.1 % (ref 40–53)
HGB BLD-MCNC: 9 G/DL (ref 13.3–17.7)
IMM GRANULOCYTES # BLD: 0.1 10E3/UL
IMM GRANULOCYTES NFR BLD: 1 %
LYMPHOCYTES # BLD AUTO: 1.1 10E3/UL (ref 0.8–5.3)
LYMPHOCYTES NFR BLD AUTO: 15 %
MAGNESIUM SERPL-MCNC: 2.3 MG/DL (ref 1.6–2.3)
MCH RBC QN AUTO: 28.8 PG (ref 26.5–33)
MCHC RBC AUTO-ENTMCNC: 30.9 G/DL (ref 31.5–36.5)
MCV RBC AUTO: 93 FL (ref 78–100)
MONOCYTES # BLD AUTO: 1.1 10E3/UL (ref 0–1.3)
MONOCYTES NFR BLD AUTO: 15 %
NEUTROPHILS # BLD AUTO: 4.8 10E3/UL (ref 1.6–8.3)
NEUTROPHILS NFR BLD AUTO: 66 %
NRBC # BLD AUTO: 0 10E3/UL
NRBC BLD AUTO-RTO: 0 /100
PHOSPHATE SERPL-MCNC: 3.2 MG/DL (ref 2.5–4.5)
PLATELET # BLD AUTO: 218 10E3/UL (ref 150–450)
POTASSIUM BLD-SCNC: 3.6 MMOL/L (ref 3.4–5.3)
PROT SERPL-MCNC: 5.9 G/DL (ref 6.8–8.8)
RBC # BLD AUTO: 3.12 10E6/UL (ref 4.4–5.9)
SODIUM SERPL-SCNC: 144 MMOL/L (ref 133–144)
WBC # BLD AUTO: 7.2 10E3/UL (ref 4–11)

## 2022-01-08 PROCEDURE — 99233 SBSQ HOSP IP/OBS HIGH 50: CPT | Mod: FS | Performed by: INTERNAL MEDICINE

## 2022-01-08 PROCEDURE — 84100 ASSAY OF PHOSPHORUS: CPT | Performed by: PHYSICIAN ASSISTANT

## 2022-01-08 PROCEDURE — 82040 ASSAY OF SERUM ALBUMIN: CPT | Performed by: PHYSICIAN ASSISTANT

## 2022-01-08 PROCEDURE — 250N000013 HC RX MED GY IP 250 OP 250 PS 637: Performed by: PHYSICIAN ASSISTANT

## 2022-01-08 PROCEDURE — 250N000011 HC RX IP 250 OP 636: Performed by: PHYSICIAN ASSISTANT

## 2022-01-08 PROCEDURE — 258N000003 HC RX IP 258 OP 636: Performed by: PHYSICIAN ASSISTANT

## 2022-01-08 PROCEDURE — 999N000044 HC STATISTIC CVC DRESSING CHANGE

## 2022-01-08 PROCEDURE — 36592 COLLECT BLOOD FROM PICC: CPT | Performed by: PHYSICIAN ASSISTANT

## 2022-01-08 PROCEDURE — 120N000002 HC R&B MED SURG/OB UMMC

## 2022-01-08 PROCEDURE — 250N000013 HC RX MED GY IP 250 OP 250 PS 637: Performed by: INTERNAL MEDICINE

## 2022-01-08 PROCEDURE — 85025 COMPLETE CBC W/AUTO DIFF WBC: CPT | Performed by: PHYSICIAN ASSISTANT

## 2022-01-08 PROCEDURE — 83735 ASSAY OF MAGNESIUM: CPT | Performed by: PHYSICIAN ASSISTANT

## 2022-01-08 PROCEDURE — 250N000011 HC RX IP 250 OP 636: Performed by: INTERNAL MEDICINE

## 2022-01-08 PROCEDURE — 80053 COMPREHEN METABOLIC PANEL: CPT | Performed by: PHYSICIAN ASSISTANT

## 2022-01-08 RX ADMIN — DIBASIC SODIUM PHOSPHATE, MONOBASIC POTASSIUM PHOSPHATE AND MONOBASIC SODIUM PHOSPHATE 500 MG: 852; 155; 130 TABLET ORAL at 06:07

## 2022-01-08 RX ADMIN — MESNA 2510 MG: 100 INJECTION, SOLUTION INTRAVENOUS at 22:18

## 2022-01-08 RX ADMIN — LEVOTHYROXINE SODIUM 75 MCG: 0.05 TABLET ORAL at 06:10

## 2022-01-08 RX ADMIN — ONDANSETRON HYDROCHLORIDE 8 MG: 8 TABLET, FILM COATED ORAL at 13:29

## 2022-01-08 RX ADMIN — PANTOPRAZOLE SODIUM 40 MG: 40 TABLET, DELAYED RELEASE ORAL at 09:56

## 2022-01-08 RX ADMIN — PROCHLORPERAZINE EDISYLATE 5 MG: 5 INJECTION INTRAMUSCULAR; INTRAVENOUS at 10:06

## 2022-01-08 RX ADMIN — SODIUM CHLORIDE, PRESERVATIVE FREE 5 ML: 5 INJECTION INTRAVENOUS at 13:32

## 2022-01-08 RX ADMIN — NYSTATIN 1000000 UNITS: 100000 SUSPENSION ORAL at 13:29

## 2022-01-08 RX ADMIN — CELECOXIB 200 MG: 200 CAPSULE ORAL at 09:56

## 2022-01-08 RX ADMIN — ONDANSETRON HYDROCHLORIDE 8 MG: 8 TABLET, FILM COATED ORAL at 21:33

## 2022-01-08 RX ADMIN — HYDROCORTISONE 20 MG: 10 TABLET ORAL at 10:04

## 2022-01-08 RX ADMIN — SODIUM CHLORIDE, PRESERVATIVE FREE 5 ML: 5 INJECTION INTRAVENOUS at 09:56

## 2022-01-08 RX ADMIN — POTASSIUM CHLORIDE 20 MEQ: 750 TABLET, EXTENDED RELEASE ORAL at 09:56

## 2022-01-08 RX ADMIN — ENOXAPARIN SODIUM 40 MG: 40 INJECTION SUBCUTANEOUS at 21:33

## 2022-01-08 RX ADMIN — CELECOXIB 200 MG: 200 CAPSULE ORAL at 21:34

## 2022-01-08 RX ADMIN — FOSAPREPITANT 150 MG: 150 INJECTION, POWDER, LYOPHILIZED, FOR SOLUTION INTRAVENOUS at 12:34

## 2022-01-08 RX ADMIN — HYDROCORTISONE 10 MG: 10 TABLET ORAL at 17:27

## 2022-01-08 RX ADMIN — NYSTATIN 1000000 UNITS: 100000 SUSPENSION ORAL at 09:54

## 2022-01-08 RX ADMIN — NYSTATIN 1000000 UNITS: 100000 SUSPENSION ORAL at 21:33

## 2022-01-08 RX ADMIN — ONDANSETRON HYDROCHLORIDE 8 MG: 8 TABLET, FILM COATED ORAL at 06:08

## 2022-01-08 RX ADMIN — DIBASIC SODIUM PHOSPHATE, MONOBASIC POTASSIUM PHOSPHATE AND MONOBASIC SODIUM PHOSPHATE 500 MG: 852; 155; 130 TABLET ORAL at 17:31

## 2022-01-08 RX ADMIN — CHOLECALCIFEROL TAB 25 MCG (1000 UNIT) 25 MCG: 25 TAB at 09:56

## 2022-01-08 RX ADMIN — METRONIDAZOLE: 7.5 GEL TOPICAL at 09:54

## 2022-01-08 RX ADMIN — METRONIDAZOLE: 7.5 GEL TOPICAL at 21:35

## 2022-01-08 RX ADMIN — OLANZAPINE 5 MG: 5 TABLET, FILM COATED ORAL at 21:34

## 2022-01-08 ASSESSMENT — ACTIVITIES OF DAILY LIVING (ADL)
ADLS_ACUITY_SCORE: 4
ADLS_ACUITY_SCORE: 4
ADLS_ACUITY_SCORE: 6
ADLS_ACUITY_SCORE: 4
ADLS_ACUITY_SCORE: 6
ADLS_ACUITY_SCORE: 4
ADLS_ACUITY_SCORE: 4
ADLS_ACUITY_SCORE: 6
ADLS_ACUITY_SCORE: 4
ADLS_ACUITY_SCORE: 6
ADLS_ACUITY_SCORE: 4
ADLS_ACUITY_SCORE: 6
ADLS_ACUITY_SCORE: 6
ADLS_ACUITY_SCORE: 4
ADLS_ACUITY_SCORE: 6
ADLS_ACUITY_SCORE: 4

## 2022-01-08 ASSESSMENT — MIFFLIN-ST. JEOR: SCORE: 1374.63

## 2022-01-08 NOTE — PLAN OF CARE
1900 - 2300 VSS, afebrile. Alert and oriented x4. Denies pain. Complains of mild nausea, poor appetite tonight. Managed with scheduled zofran. Denies pain, shortness of breath. Voiding spontaneously. Ambulated nursing unit x2, up ad kaitlynn.     2300 - 0700 Day 4 ifos/ mesna hung to infuse over 24 hours. Patient resting between cares over night. Update given to oncoming RN at the bedside.

## 2022-01-08 NOTE — PROGRESS NOTES
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    TODAY: D4 Doxil/Ifos  - Continue chemotherapy and close monitoring for s/sx of neurotoxicity.  - Re-dose IV Emend for persistent chemotherapy-induced nausea.  - Continue mouth cares -- Nystatin, s/s swish and MMW prn   - Lymphedema consult for mild LE edema; likely to benefit from some soft stockings for light compression.    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 Wolff. Patient presented to his PCP in 03/2021 with chest/left shoulder and back pain that led to imaging which revealed multiple lung mets, included a left pleural mass. There were difficulties in getting diagnostic biopsy; eventually, biopsy of the mediastinal mass (5/20/21) revealed a poorly characterized malignant spindle cell neoplasm with high PD-L1 expression (90%), Ki-67 70%. 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 Insufficient material to send [facility]. Baseline imaging (6/21/21) revealed progression of lung mets and left-sided subdiaphragmatic mass, stable liver lesions. He was seen by ENT for hoarseness, which was attributed to left true vocal fold motion impairment from mediastinal mass. Given high PD-L1 expression, he was started on Keytruda (C1=6/21/21). Interval imaging (CT 8/2/21) revealed positive response to treatment. He received 6 cycles total, most recently C6=10/4/21. Unfortunately, restaging imaging (10/18/21) revealed interval increase in size of the LUQ/splenic mass; however,  the mediastinal and left pleural mass were stable to slightly decreased in size. He met with Dr. Wolff, who recommended a change in therapy to Doxil/ifosfamide. He was admitted on 10/26/21 to receive Cycle #1 of Doxil/ifos which he tolerated reasonably well with no neurotoxicities, but did have moderate chemotherapy-induced nausea. After discharge he had significant mucositis, poor PO intake, nausea, and lyte derangements. Given adverse effects, Cycle 2 was delayed by 1 week and Doxil was dose reduced to 70 mg, or 40 mg/m2 (previously given 80 mg, or 45mg/m2), and ifosfamide was reduced by 10% (1500mg/m2 ? 1350mg/m2).   - Tolerated Cycle 2 Doxil/Ifos (D1=12/1/21) better, so plan to proceed with same dosing for cycle 3.   - PICC ordered on admission, remove at discharge. Port placement previously scheduled for 1/5/22 though canceled d/t this admission. Will need to rechedule port before Cycle 4. I have requested and outpatient order placed. Outpatient team working to arrange prior to next cycle.   - Outpatient team plans to restage with repeat imaging after Cycle 4.                                            Treatment Plan: Doxil + ifosfamide (C3D1=1/4/22).               Doxil 70mg IV once - D1                Ifosfamide/Mesna 1350 mg/m2 (2510 mg) CIVI - D1-6                Mesna 1350 mg/m2 (2510 mg) IV over 18 hrs - starting D7                Pre-meds: Emend 150 mg once Day 1 and Day 4; Zofran 8 mg Q8H                Neulasta injection - D8, scheduled at [facility] on 1/13     # Cancer-related pain  - Continue PTA Celebrex 200 mg BID   - Continue PTA topical Bengay PRN at bedtime to left back      # Normocytic anemia  Noted intermittently. Likely related to chemo and underlying disease.   - Transfuse to maintain Hgb >7, will sign blood consent if indicated     CV  # CAD   Followed by Dr. Gigi Vernon at [facility]. He was noted to have coronary calcium on chest CT. CTA 9/29/21 with moderate mid LAD  "stenosis. Small to moderate caliber ramus branch with severe diffuse disease. Echo completed 9/29/21 with normal EF and no valvular dysfunction. Repeat ECHO on 10/27 with EF 60-65%, early diastolic dysfunction but otherwise no change from previous.  - PTA rosuvastatin 40 mg daily HELD for transaminitis  - Encourage outpatient cardiology follow-up as recommended     # H/o intermittent elevated blood pressures  BPs ranging from normotensive to hypertensive (with SBP 140s-160) during previous admissions. He states that he runs higher at baseline (though this is based on clinic assessments as he does not typically monitor his BP at home). Appears recent intermittent clinic BPs sit at 140s-150s/80s-90s when seen in person, otherwise many of the visits are currently virtual. He is not on any antihypertensive medications PTA. Suspect he has undiagnosed essential HTN. Will monitor closely this admission.    - Would recommend outpatient PCP follow-up for further discussion of BP monitoring and management.   - Oral hydralazine available PRN (SBP >170, DBP >100)    GI  # Chemotherapy-induced nausea  Improved C2 with the following regimen, plan to continue this admission. Per patient, nausea typically \"kicks in\" around day 3 or 4.   - Scheduled Zofran Q8H  - PRN compazine and Ativan  - Emend 150 mg IV x1 on D1; consider repeating D4 if nausea persists  - Zyprexa 5 mg at bedtime which has been helpful in past     # Transaminitis, stable  First noted in late Nov: , , TBili WNL on 11/29/21. PTA statin was subsequently held. Per outpatient notes, he did not have any abdominal pain or other symptoms concerning for viral hepatitis. Discussed with Dr. Wolff, and it was discussed that this could be due to his statin and OK to proceed with chemotherapy as planned above. He also has known liver metastases which are likely contributing.   - Continue to hold PTA statin  - Follow LFTs daily     # GERD  - Continue PTA " omeprazole  - TUMS and Pepto Bismol available PRN     RENAL/FEN  # Hypervolemia  Weight trending up throughout admission, presumably due to volume overload. Patient was noted on admission to have bilateral LE edema, 1+. Now increasing slightly throughout admission in the setting of continuous IV infusion. No crackles on auscultation or dyspnea complaints concerning for pulmonary edema.  - Lymphedema consulted for leg wraps  - Consider diuresis in the coming days pending overall trend; patient would prefer to avoid if possible  - Follow I/Os, daily weights    # H/o HUSSEIN, stable  Baseline Cr ~0.8 which is noted to transiently increased with previously chemo cycles, presumed d/t hypovolemia in the setting of nausea with poor PO intake. CR has remained at baseline throughout admission thus far.    - Continue with twice weekly IVF infusion appts OP  - Follow daily CMP  - No mIVF; bolus PRN.     # Hypophosphatemia, intermittent  # Hypokalemia, intermittent  Secondary to ifosfamide. Required additional phos and K replacement after discharge from  Doxil/Ifos. Lytes have remained stable/normal throughout admission thus far while on daily supplements.   - Follow Phos and K daily while inpatient  - Continue PTA PO Phos and KCl   - Replete additional per standing protocol     ENDO  # Panhypopituitarism  # Macroadenoma of the pituitary gland  Diagnosed in 2010. Found to have a pituitary macroadenoma, 1.9 cm in largest dimension. Underwent hypophysectomy on 8/23/10. Subsequent presumptive pituitary deficiency, on empiric meds for adrenal insufficiency and thyroid. Previously followed by Dr. Bill Mccall; now has established care with Dr. Jamir Grant on 11/11/21.  - Continue PTA Levothyroxine  - Continue PTA Hydrocortisone 15 mg qAM and 10 mg qPM  - Continue endocrinology follow-up as scheduled (next scheduled for 5/16/22)     ENT  # Acute hordeolum of the left lower lid, stable  Noted to inner lower left eyelid on  admission. History of similar in the past, not typically necessitating treatment. No associated symptoms of pain, visual blurring, or irritation.  - No acute inpatient management issues  - Monitor clinically  - Warm compresses PRN    # Vocal cord dysfunction  Manifested as hoarseness. Seen by ENT (Dr. Kyree Bearden) on 6/21/21 and noted to have left true vocal fold motion impairment from mediastinal mass. Underwent injection of ProLaryn (filler/bulking agent) on 7/1/21, which reportedly has been minimally beneficial. Seen in follow-up by Dr. Bearden on 9/1/21 and noted to have minimal improvement; he was then referred to [facility] ([facility]   - No acute inpatient management issues  - Continue outpatient ENT follow-up as previously scheduled (next scheduled for 3/10/22)     # Oral candidiasis  # H/o mucositis  Significant 2/2 Doxil. Noted after C1 chemo, then better following C2 dose-reduction. On admission patient reports thrush again starting a couple days prior to admission. Restarted Nystatin at home on 1/3. Initially denied any mouth sores though endorses poor taste and tongue coating. Possibly new developing mucositis lesions to R inner cheek on 1/6. Still with good PO intake.    - Continue salt/soda swishes  - Continue Nystatin QID with this cycle  - MMW, Doxepin available PRN      DERM  # History of palmar-plantar erythrodysesthesia (Hand-Foot Syndrome)  Due to Doxil. Noted following C2.  - Iced hands and mouth during Doxil infusion, tolerated well   - Will continue this practice with subsequent cycles     # Rosacea  - Continue PTA metronidazole gel     PSYCH  # History of anxiety/depression  Situational, related to malaise/illness. Previously placed health psych referral.  - No acute inpatient management needs     FEN:  - Encouraged PO fluids, bolus PRN  - PRN lyte replacement  - RDAT     Prophy/Misc:  - VTE: ppx Lovenox 40mg daily during admission   - GI/PUD: PTA omeprazole, Tums PRN  - Bowels: Senna  and Miralax PRN  - Activity: Consider consulting PT if indicated     Code: FULL  Disposition: Admitted to Oncology Team 3 service for scheduled chemotherapy 7-day stay. Anticipate discharge ~1/11 pending chemo timing and barring complications.    Follow-up: Follows with Dr. Wolff  - Twice weekly labs/infusion appts are scheduled at [facility] follow-up visit is scheduled on 1/12  - Neulasta infusion added to already-scheduled infusion appt on 1/13 at [place]   - Have requested repeat scheduling of port placement, outpatient team aware and working on it prior to next cycle of chemo.     Patient and plan of care was discussed with attending physician Dr. Cheema.    Rayna Martin PA-C  Hematology/Oncology  Pager: #[phone]    Interval History   No acute overnight events. Slept well after Zyprexa. Feeling more nauseous this AM. Ready for more Emend. Otherwise taking anti-emetics around the clock, with additional PRN. No vomiting. Ate some cream of wheat this AM and was able to keep that and his pills down. Edema is a bit worse in the legs, pronounced at the sock lines. He likes the idea of soft compression stockings. No calf pain. No dyspnea, orthopnea, or WALKER. No tremors, myoclonus, vision changes, change in mentation or emotional state, or other neurological concerns. All questions answered.    A comprehensive review of systems was obtained and is negative other than noted here or in the HPI.     Physical Exam   Temp: 97.1  F (36.2  C) Temp src: Oral BP: (!) 144/83 Pulse: 79   Resp: 17 SpO2: 99 % O2 Device: None (Room air)    Vitals:    01/06/22 0743 01/07/22 0700 01/08/22 0825   Weight: 69.2 kg (152 lb 9.6 oz) 69.5 kg (153 lb 4.8 oz) 67.1 kg (147 lb 14.9 oz)     Vital Signs with Ranges  Temp:  [96.1  F (35.6  C)-97.2  F (36.2  C)] 97.1  F (36.2  C)  Pulse:  [66-80] 79  Resp:  [16-20] 17  BP: (136-163)/(78-96) 144/83  SpO2:  [98 %-100 %] 99 %  I/O last 3 completed shifts:  In: 772.6 [P.O.:180; I.V.:20; IV  Piggyback:572.6]  Out: 1650 [Urine:1650]    General: Pleasant elderly male, appears older than stated age. Awake and interactive. No acute distress. Seated on the edge of the bed.  Skin: Warm, dry, and intact without rashes or lesions.   Head: Normocephalic and atraumatic.   EENT: Chronic hemifacial spasm again noted. Small hordeolum to left medial lower lid without surrounding erythema or edema. Scant whitish coating to tongue. No apparent mucositis.   Cardiac: Regular rate and rhythm.   Respiratory: No signs of respiratory distress or accessory muscle use. Lungs CTAB.  Abd: Soft, symmetric, and non-tender without distension. BS present and normoactive.   Extremities: 2+ pitting edema to bilateral LE above sock line. No calf tenderness. Extremities are thin, decreased muscle bulk and tone.  Neuro: A&Ox3 with normal speech. No dysarthria. Memory and thought process preserved. Chronic hemifacial spasm. No tremors. No myoclonus. Normal gait when seen ambulating in halls.   Psych: Appropriate mood and affect.     Medications     - MEDICATION INSTRUCTIONS -         celecoxib  200 mg Oral BID     Chemotherapy Infusing-Continuous Infusion   Does not apply Q8H     [START ON 1/11/2022] dextrose 5% water  10-20 mL Intracatheter Daily at 8 pm     [START ON 1/11/2022] dextrose 5% water  10-20 mL Intracatheter Daily at 8 pm     enoxaparin ANTICOAGULANT  40 mg Subcutaneous Q24H     [START ON 1/11/2022] filgrastim (NEUPOGEN/GRANIX) intravenous  5 mcg/kg (Treatment Plan Recorded) Intravenous Daily at 8 pm     heparin lock flush  5-20 mL Intracatheter Q24H     heparin lock flush  5-20 mL Intracatheter Q24H     hydrocortisone  10 mg Oral Daily     hydrocortisone  20 mg Oral QAM     ifosfamide (IFEX) infusion  1,350 mg/m2 (Treatment Plan Recorded) Intravenous Q24H     levothyroxine  75 mcg Oral QAM     [START ON 1/10/2022] mesna (MESNEX) infusion  1,350 mg/m2 (Treatment Plan Recorded) Intravenous Once     metroNIDAZOLE   Topical BID      nystatin  1,000,000 Units Oral 4x Daily     OLANZapine  5 mg Oral At Bedtime     ondansetron  8 mg Oral Q8H     pantoprazole  40 mg Oral QAM AC     phosphorus tablet 250 mg  500 mg Oral BID     potassium chloride  20 mEq Oral Daily     sodium chloride (PF)  10-40 mL Intracatheter Q8H     sodium chloride (PF)  10-40 mL Intracatheter Q8H     sodium chloride (PF)  10-40 mL Intracatheter Q8H     cholecalciferol  25 mcg Oral Daily       Data   Results for orders placed or performed during the hospital encounter of 01/04/22 (from the past 24 hour(s))   CBC with platelets differential    Narrative    The following orders were created for panel order CBC with platelets differential.  Procedure                               Abnormality         Status                     ---------                               -----------         ------                     CBC with platelets and d...[[id number]]  Abnormal            Final result                 Please view results for these tests on the individual orders.   Comprehensive metabolic panel   Result Value Ref Range    Sodium 144 133 - 144 mmol/L    Potassium 3.6 3.4 - 5.3 mmol/L    Chloride 111 (H) 94 - 109 mmol/L    Carbon Dioxide (CO2) 25 20 - 32 mmol/L    Anion Gap 8 3 - 14 mmol/L    Urea Nitrogen 16 7 - 30 mg/dL    Creatinine 0.76 0.66 - 1.25 mg/dL    Calcium 8.8 8.5 - 10.1 mg/dL    Glucose 104 (H) 70 - 99 mg/dL    Alkaline Phosphatase 153 (H) 40 - 150 U/L    AST 13 0 - 45 U/L    ALT 18 0 - 70 U/L    Protein Total 5.9 (L) 6.8 - 8.8 g/dL    Albumin 2.4 (L) 3.4 - 5.0 g/dL    Bilirubin Total 0.3 0.2 - 1.3 mg/dL    GFR Estimate >90 >60 mL/min/1.73m2   Magnesium   Result Value Ref Range    Magnesium 2.3 1.6 - 2.3 mg/dL   Phosphorus   Result Value Ref Range    Phosphorus 3.2 2.5 - 4.5 mg/dL   CBC with platelets and differential   Result Value Ref Range    WBC Count 7.2 4.0 - 11.0 10e3/uL    RBC Count 3.12 (L) 4.40 - 5.90 10e6/uL    Hemoglobin 9.0 (L) 13.3 - 17.7 g/dL     Hematocrit 29.1 (L) 40.0 - 53.0 %    MCV 93 78 - 100 fL    MCH 28.8 26.5 - 33.0 pg    MCHC 30.9 (L) 31.5 - 36.5 g/dL    RDW 19.6 (H) 10.0 - 15.0 %    Platelet Count 218 150 - 450 10e3/uL    % Neutrophils 66 %    % Lymphocytes 15 %    % Monocytes 15 %    % Eosinophils 2 %    % Basophils 1 %    % Immature Granulocytes 1 %    NRBCs per 100 WBC 0 <1 /100    Absolute Neutrophils 4.8 1.6 - 8.3 10e3/uL    Absolute Lymphocytes 1.1 0.8 - 5.3 10e3/uL    Absolute Monocytes 1.1 0.0 - 1.3 10e3/uL    Absolute Eosinophils 0.1 0.0 - 0.7 10e3/uL    Absolute Basophils 0.0 0.0 - 0.2 10e3/uL    Absolute Immature Granulocytes 0.1 <=0.4 10e3/uL    Absolute NRBCs 0.0 10e3/uL

## 2022-01-08 NOTE — PROGRESS NOTES
Nursing Focus: Chemotherapy  D: Positive blood return via PICC. Insertion site is clean/dry/intact, dressing intact with no complaints of pain.  Urine output is recorded in intake in Doc Flowsheet.    I: Premedications given per order (see electronic medical administration record). Dose #4 of ifosfamide/ mesna started to infuse over 24 hours. Reviewed pt teaching on chemotherapy side effects.  Pt denies need for further teaching. Chemotherapy double checked per protocol by two chemotherapy competent RN's.   A: Tolerating procedure well. Denies nausea and or pain.   P: Continue to monitor urine output and symptoms of nausea. Screen for symptoms of toxicity.

## 2022-01-09 ENCOUNTER — APPOINTMENT (OUTPATIENT)
Dept: OCCUPATIONAL THERAPY | Facility: CLINIC | Age: 72
DRG: 847 | End: 2022-01-09
Attending: PHYSICIAN ASSISTANT
Payer: MEDICARE

## 2022-01-09 LAB
ALBUMIN SERPL-MCNC: 2.4 G/DL (ref 3.4–5)
ALP SERPL-CCNC: 147 U/L (ref 40–150)
ALT SERPL W P-5'-P-CCNC: 24 U/L (ref 0–70)
ANION GAP SERPL CALCULATED.3IONS-SCNC: 4 MMOL/L (ref 3–14)
AST SERPL W P-5'-P-CCNC: 21 U/L (ref 0–45)
BASOPHILS # BLD AUTO: 0 10E3/UL (ref 0–0.2)
BASOPHILS NFR BLD AUTO: 1 %
BILIRUB SERPL-MCNC: 0.3 MG/DL (ref 0.2–1.3)
BUN SERPL-MCNC: 17 MG/DL (ref 7–30)
CALCIUM SERPL-MCNC: 8.7 MG/DL (ref 8.5–10.1)
CHLORIDE BLD-SCNC: 112 MMOL/L (ref 94–109)
CO2 SERPL-SCNC: 27 MMOL/L (ref 20–32)
CREAT SERPL-MCNC: 0.9 MG/DL (ref 0.66–1.25)
EOSINOPHIL # BLD AUTO: 0.1 10E3/UL (ref 0–0.7)
EOSINOPHIL NFR BLD AUTO: 2 %
ERYTHROCYTE [DISTWIDTH] IN BLOOD BY AUTOMATED COUNT: 19.2 % (ref 10–15)
GFR SERPL CREATININE-BSD FRML MDRD: 90 ML/MIN/1.73M2
GLUCOSE BLD-MCNC: 86 MG/DL (ref 70–99)
HCT VFR BLD AUTO: 27.4 % (ref 40–53)
HGB BLD-MCNC: 8.5 G/DL (ref 13.3–17.7)
IMM GRANULOCYTES # BLD: 0 10E3/UL
IMM GRANULOCYTES NFR BLD: 1 %
LYMPHOCYTES # BLD AUTO: 0.9 10E3/UL (ref 0.8–5.3)
LYMPHOCYTES NFR BLD AUTO: 16 %
MAGNESIUM SERPL-MCNC: 2.2 MG/DL (ref 1.6–2.3)
MCH RBC QN AUTO: 28.8 PG (ref 26.5–33)
MCHC RBC AUTO-ENTMCNC: 31 G/DL (ref 31.5–36.5)
MCV RBC AUTO: 93 FL (ref 78–100)
MONOCYTES # BLD AUTO: 0.8 10E3/UL (ref 0–1.3)
MONOCYTES NFR BLD AUTO: 13 %
NEUTROPHILS # BLD AUTO: 3.8 10E3/UL (ref 1.6–8.3)
NEUTROPHILS NFR BLD AUTO: 67 %
NRBC # BLD AUTO: 0 10E3/UL
NRBC BLD AUTO-RTO: 0 /100
PHOSPHATE SERPL-MCNC: 3.6 MG/DL (ref 2.5–4.5)
PLATELET # BLD AUTO: 191 10E3/UL (ref 150–450)
POTASSIUM BLD-SCNC: 3.6 MMOL/L (ref 3.4–5.3)
PROT SERPL-MCNC: 5.6 G/DL (ref 6.8–8.8)
RBC # BLD AUTO: 2.95 10E6/UL (ref 4.4–5.9)
SODIUM SERPL-SCNC: 143 MMOL/L (ref 133–144)
WBC # BLD AUTO: 5.7 10E3/UL (ref 4–11)

## 2022-01-09 PROCEDURE — 258N000003 HC RX IP 258 OP 636: Performed by: PHYSICIAN ASSISTANT

## 2022-01-09 PROCEDURE — 97165 OT EVAL LOW COMPLEX 30 MIN: CPT | Mod: GO | Performed by: OCCUPATIONAL THERAPIST

## 2022-01-09 PROCEDURE — 97535 SELF CARE MNGMENT TRAINING: CPT | Mod: GO | Performed by: OCCUPATIONAL THERAPIST

## 2022-01-09 PROCEDURE — 84100 ASSAY OF PHOSPHORUS: CPT | Performed by: PHYSICIAN ASSISTANT

## 2022-01-09 PROCEDURE — 250N000011 HC RX IP 250 OP 636: Performed by: PHYSICIAN ASSISTANT

## 2022-01-09 PROCEDURE — 36592 COLLECT BLOOD FROM PICC: CPT | Performed by: PHYSICIAN ASSISTANT

## 2022-01-09 PROCEDURE — 120N000002 HC R&B MED SURG/OB UMMC

## 2022-01-09 PROCEDURE — 250N000013 HC RX MED GY IP 250 OP 250 PS 637: Performed by: INTERNAL MEDICINE

## 2022-01-09 PROCEDURE — 250N000011 HC RX IP 250 OP 636: Performed by: INTERNAL MEDICINE

## 2022-01-09 PROCEDURE — 99233 SBSQ HOSP IP/OBS HIGH 50: CPT | Mod: FS | Performed by: INTERNAL MEDICINE

## 2022-01-09 PROCEDURE — 250N000013 HC RX MED GY IP 250 OP 250 PS 637: Performed by: PHYSICIAN ASSISTANT

## 2022-01-09 PROCEDURE — 80053 COMPREHEN METABOLIC PANEL: CPT | Performed by: PHYSICIAN ASSISTANT

## 2022-01-09 PROCEDURE — 85025 COMPLETE CBC W/AUTO DIFF WBC: CPT | Performed by: PHYSICIAN ASSISTANT

## 2022-01-09 PROCEDURE — 83735 ASSAY OF MAGNESIUM: CPT | Performed by: PHYSICIAN ASSISTANT

## 2022-01-09 RX ADMIN — METRONIDAZOLE: 7.5 GEL TOPICAL at 20:33

## 2022-01-09 RX ADMIN — Medication 5 ML: at 06:56

## 2022-01-09 RX ADMIN — HYDROCORTISONE 20 MG: 10 TABLET ORAL at 10:12

## 2022-01-09 RX ADMIN — NYSTATIN 1000000 UNITS: 100000 SUSPENSION ORAL at 13:01

## 2022-01-09 RX ADMIN — CHOLECALCIFEROL TAB 25 MCG (1000 UNIT) 25 MCG: 25 TAB at 10:12

## 2022-01-09 RX ADMIN — CELECOXIB 200 MG: 200 CAPSULE ORAL at 10:12

## 2022-01-09 RX ADMIN — DIBASIC SODIUM PHOSPHATE, MONOBASIC POTASSIUM PHOSPHATE AND MONOBASIC SODIUM PHOSPHATE 500 MG: 852; 155; 130 TABLET ORAL at 18:09

## 2022-01-09 RX ADMIN — ENOXAPARIN SODIUM 40 MG: 40 INJECTION SUBCUTANEOUS at 20:32

## 2022-01-09 RX ADMIN — METRONIDAZOLE: 7.5 GEL TOPICAL at 10:13

## 2022-01-09 RX ADMIN — ONDANSETRON HYDROCHLORIDE 8 MG: 8 TABLET, FILM COATED ORAL at 05:10

## 2022-01-09 RX ADMIN — MESNA 2510 MG: 100 INJECTION, SOLUTION INTRAVENOUS at 21:17

## 2022-01-09 RX ADMIN — NYSTATIN 1000000 UNITS: 100000 SUSPENSION ORAL at 20:32

## 2022-01-09 RX ADMIN — HYDROCORTISONE 10 MG: 10 TABLET ORAL at 17:04

## 2022-01-09 RX ADMIN — NYSTATIN 1000000 UNITS: 100000 SUSPENSION ORAL at 10:12

## 2022-01-09 RX ADMIN — ONDANSETRON HYDROCHLORIDE 8 MG: 8 TABLET, FILM COATED ORAL at 12:58

## 2022-01-09 RX ADMIN — LEVOTHYROXINE SODIUM 75 MCG: 0.05 TABLET ORAL at 05:10

## 2022-01-09 RX ADMIN — DIBASIC SODIUM PHOSPHATE, MONOBASIC POTASSIUM PHOSPHATE AND MONOBASIC SODIUM PHOSPHATE 500 MG: 852; 155; 130 TABLET ORAL at 05:10

## 2022-01-09 RX ADMIN — PROCHLORPERAZINE MALEATE 5 MG: 5 TABLET ORAL at 10:17

## 2022-01-09 RX ADMIN — CELECOXIB 200 MG: 200 CAPSULE ORAL at 22:29

## 2022-01-09 RX ADMIN — NYSTATIN 1000000 UNITS: 100000 SUSPENSION ORAL at 22:29

## 2022-01-09 RX ADMIN — ONDANSETRON HYDROCHLORIDE 8 MG: 8 TABLET, FILM COATED ORAL at 20:32

## 2022-01-09 RX ADMIN — OLANZAPINE 5 MG: 5 TABLET, FILM COATED ORAL at 22:29

## 2022-01-09 RX ADMIN — POTASSIUM CHLORIDE 20 MEQ: 750 TABLET, EXTENDED RELEASE ORAL at 10:12

## 2022-01-09 RX ADMIN — PANTOPRAZOLE SODIUM 40 MG: 40 TABLET, DELAYED RELEASE ORAL at 10:12

## 2022-01-09 ASSESSMENT — ACTIVITIES OF DAILY LIVING (ADL)
ADLS_ACUITY_SCORE: 4

## 2022-01-09 ASSESSMENT — MIFFLIN-ST. JEOR: SCORE: 1371.43

## 2022-01-09 NOTE — PROGRESS NOTES
Nursing Focus: Chemotherapy  D: Positive, brisk blood return via PICC. Insertion site is clean/dry/intact, dressing intact with no complaints of pain.  Urine output is recorded in intake in Doc Flowsheet.    I: Premedications given per order (see electronic medical administration record). Dose #5 of ifos/ mesna started to infuse over 24 hours. Reviewed pt teaching on chemotherapy side effects.  Pt denies need for further teaching. Chemotherapy double checked per protocol by two chemotherapy competent RN's.   A: Tolerating procedure well. Denies nausea and or pain.   P: Continue to monitor urine output and symptoms of nausea. Screen for symptoms of toxicity.

## 2022-01-09 NOTE — PROGRESS NOTES
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    TODAY: D5 Doxil/Ifos  - Continue chemotherapy and close monitoring for s/sx of neurotoxicity.  - Continue scheduled and PRN anti-emetics for ongoing CINV.  - Continue mouth cares -- Nystatin, s/s swish and MMW prn   - Lymphedema consult for mild LE edema (not seen yesterday); likely to benefit from some soft stockings for light compression.    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 Wolff. Patient presented to his PCP in 03/2021 with chest/left shoulder and back pain that led to imaging which revealed multiple lung mets, included a left pleural mass. There were difficulties in getting diagnostic biopsy; eventually, biopsy of the mediastinal mass (5/20/21) revealed a poorly characterized malignant spindle cell neoplasm with high PD-L1 expression (90%), Ki-67 70%. 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 Insufficient material to send [facility]. Baseline imaging (6/21/21) revealed progression of lung mets and left-sided subdiaphragmatic mass, stable liver lesions. He was seen by ENT for hoarseness, which was attributed to left true vocal fold motion impairment from mediastinal mass. Given high PD-L1 expression, he was started on Keytruda (C1=6/21/21). Interval imaging (CT 8/2/21) revealed positive response to treatment. He received 6 cycles total, most recently C6=10/4/21. Unfortunately, restaging imaging (10/18/21) revealed interval increase in size of the  LUQ/splenic mass; however, the mediastinal and left pleural mass were stable to slightly decreased in size. He met with Dr. Wolff, who recommended a change in therapy to Doxil/ifosfamide. He was admitted on 10/26/21 to receive Cycle #1 of Doxil/ifos which he tolerated reasonably well with no neurotoxicities, but did have moderate chemotherapy-induced nausea. After discharge he had significant mucositis, poor PO intake, nausea, and lyte derangements. Given adverse effects, Cycle 2 was delayed by 1 week and Doxil was dose reduced to 70 mg, or 40 mg/m2 (previously given 80 mg, or 45mg/m2), and ifosfamide was reduced by 10% (1500mg/m2 ? 1350mg/m2).   - Tolerated Cycle 2 Doxil/Ifos (D1=12/1/21) better, so plan to proceed with same dosing for cycle 3.   - PICC ordered on admission, remove at discharge. Port placement previously scheduled for 1/5/22 though canceled d/t this admission. Will need to rechedule port before Cycle 4. I have requested and outpatient order placed. Outpatient team working to arrange prior to next cycle.   - Outpatient team plans to restage with repeat imaging after Cycle 4.                                            Treatment Plan: Doxil + ifosfamide (C3D1=1/4/22).               Doxil 70mg IV once - D1                Ifosfamide/Mesna 1350 mg/m2 (2510 mg) CIVI - D1-6                Mesna 1350 mg/m2 (2510 mg) IV over 18 hrs - starting D7                Pre-meds: Emend 150 mg once Day 1 and Day 4; Zofran 8 mg Q8H                Neulasta injection - D8, scheduled at [facility] on 1/13     # Cancer-related pain  - Continue PTA Celebrex 200 mg BID   - Continue PTA topical Bengay PRN at bedtime to left back      # Normocytic anemia  Noted intermittently. Likely related to chemo and underlying disease.   - Transfuse to maintain Hgb >7, will sign blood consent if indicated     CV  # CAD   Followed by Dr. Gigi Vernon at [facility]. He was noted to have coronary calcium on chest CT. CTA  "9/29/21 with moderate mid LAD stenosis. Small to moderate caliber ramus branch with severe diffuse disease. Echo completed 9/29/21 with normal EF and no valvular dysfunction. Repeat ECHO on 10/27 with EF 60-65%, early diastolic dysfunction but otherwise no change from previous.  - PTA rosuvastatin 40 mg daily HELD for transaminitis  - Encourage outpatient cardiology follow-up as recommended     # H/o intermittent elevated blood pressures  BPs ranging from normotensive to hypertensive (with SBP 140s-160) during previous admissions. He states that he runs higher at baseline (though this is based on clinic assessments as he does not typically monitor his BP at home). Appears recent intermittent clinic BPs sit at 140s-150s/80s-90s when seen in person, otherwise many of the visits are currently virtual. He is not on any antihypertensive medications PTA. Suspect he has undiagnosed essential HTN. Will monitor closely this admission.    - Would recommend outpatient PCP follow-up for further discussion of BP monitoring and management.   - Oral hydralazine available PRN (SBP >170, DBP >100)    GI  # Chemotherapy-induced nausea  Improved C2 with the following regimen, plan to continue this admission. Per patient, nausea typically \"kicks in\" around day 3 or 4.   - Scheduled Zofran Q8H  - PRN compazine and Ativan  - Emend 150 mg IV x1 on D1; re-dosed on D4  - Zyprexa 5 mg at bedtime which has been helpful in past     # Transaminitis, stable  First noted in late Nov: , , TBili WNL on 11/29/21. PTA statin was subsequently held. Per outpatient notes, he did not have any abdominal pain or other symptoms concerning for viral hepatitis. Discussed with Dr. Wolff, and it was discussed that this could be due to his statin and OK to proceed with chemotherapy as planned above. He also has known liver metastases which are likely contributing.   - Continue to hold PTA statin  - Follow LFTs daily     # GERD  - Continue PTA " omeprazole  - TUMS and Pepto Bismol available PRN     RENAL/FEN  # Hypervolemia  Patient was noted on admission to have bilateral LE edema, 1+. Now increasing slightly throughout admission in the setting of continuous IV infusion. No crackles on auscultation or dyspnea complaints concerning for pulmonary edema. Weight actually trending down. Albumin low, 2.4, and suspect this is related to third-spacing.  - Lymphedema consulted for soft compression stockings  - Hold off on diuresis given overall down-trend in weight  - Follow I/Os, daily weights    # H/o HUSSEIN, stable  Baseline Cr ~0.8 which is noted to transiently increased with previously chemo cycles, presumed d/t hypovolemia in the setting of nausea with poor PO intake. CR has remained at baseline throughout admission thus far.    - Continue with twice weekly IVF infusion appts OP  - Follow daily CMP  - No mIVF; bolus PRN.     # Hypophosphatemia, intermittent  # Hypokalemia, intermittent  Secondary to ifosfamide. Required additional phos and K replacement after discharge from  Doxil/Ifos. Lytes have remained stable/normal throughout admission thus far while on daily supplements.   - Follow Phos and K daily while inpatient  - Continue PTA PO Phos and KCl   - Replete additional per standing protocol     ENDO  # Panhypopituitarism  # Macroadenoma of the pituitary gland  Diagnosed in 2010. Found to have a pituitary macroadenoma, 1.9 cm in largest dimension. Underwent hypophysectomy on 8/23/10. Subsequent presumptive pituitary deficiency, on empiric meds for adrenal insufficiency and thyroid. Previously followed by Dr. Bill Mccall; now has established care with Dr. Jamir Grant on 11/11/21.  - Continue PTA Levothyroxine  - Continue PTA Hydrocortisone 15 mg qAM and 10 mg qPM  - Continue endocrinology follow-up as scheduled (next scheduled for 5/16/22)     ENT  # Acute hordeolum of the left lower lid, stable  Noted to inner lower left eyelid on admission.  History of similar in the past, not typically necessitating treatment. No associated symptoms of pain, visual blurring, or irritation.  - No acute inpatient management issues  - Monitor clinically  - Warm compresses PRN    # Vocal cord dysfunction  Manifested as hoarseness. Seen by ENT (Dr. Kyree Bearden) on 6/21/21 and noted to have left true vocal fold motion impairment from mediastinal mass. Underwent injection of ProLaryn (filler/bulking agent) on 7/1/21, which reportedly has been minimally beneficial. Seen in follow-up by Dr. Bearden on 9/1/21 and noted to have minimal improvement; he was then referred to [facility] ([facility]).   - No acute inpatient management issues  - Continue outpatient ENT follow-up as previously scheduled (next scheduled for 3/10/22)     # Oral candidiasis  # H/o mucositis  Significant 2/2 Doxil. Noted after C1 chemo, then better following C2 dose-reduction. On admission patient reports thrush again starting a couple days prior to admission. Restarted Nystatin at home on 1/3. Initially denied any mouth sores though endorses poor taste and tongue coating. Possibly new developing mucositis lesions to R inner cheek on 1/6. Still with good PO intake.    - Continue salt/soda swishes  - Continue Nystatin QID with this cycle  - MMW, Doxepin available PRN      DERM  # History of palmar-plantar erythrodysesthesia (Hand-Foot Syndrome)  Due to Doxil. Noted following C2.  - Iced hands and mouth during Doxil infusion, tolerated well   - Will continue this practice with subsequent cycles     # Rosacea  - Continue PTA metronidazole gel     PSYCH  # History of anxiety/depression  Situational, related to malaise/illness. Previously placed health psych referral.  - No acute inpatient management needs     FEN:  - Encouraged PO fluids, bolus PRN  - PRN lyte replacement  - RDAT     Prophy/Misc:  - VTE: ppx Lovenox 40mg daily during admission   - GI/PUD: PTA omeprazole, Tums PRN  - Bowels: Senna and  "Miralax PRN  - Activity: Consider consulting PT if indicated     Code: FULL  Disposition: Admitted to Oncology Team 3 service for scheduled chemotherapy 7-day stay. Anticipate discharge ~1/11 pending chemo timing and barring complications.    Follow-up: Follows with Dr. Wolff  - Twice weekly labs/infusion appts are scheduled at [facility] follow-up visit is scheduled on 1/12  - Neulasta infusion added to already-scheduled infusion appt on 1/13 at [place]   - Have requested repeat scheduling of port placement, outpatient team aware and working on it prior to next cycle of chemo.     Patient and plan of care was discussed with attending physician Dr. Cheema.    Rayna Martin PA-C  Hematology/Oncology  Pager: #[phone]    Interval History   No acute overnight events. Feeling a bit better today. Nausea well-enough controlled that he is eating some breakfast, bread with peanut butter and honey. No vomiting. No abdominal pain. No new complaints or concerns. Edema is stable. No tremors. No further myoclonic jerks. No confusion or change in mentation. Going to walk the halls so he can \"get better,\" he tells me.     A comprehensive review of systems was obtained and is negative other than noted here or in the HPI.     Physical Exam   Temp: 98.6  F (37  C) Temp src: Oral BP: 136/74 Pulse: 67   Resp: 18 SpO2: 98 % O2 Device: None (Room air)    Vitals:    01/07/22 0700 01/08/22 0825 01/09/22 0831   Weight: 69.5 kg (153 lb 4.8 oz) 67.1 kg (147 lb 14.9 oz) 66.8 kg (147 lb 3.6 oz)     Vital Signs with Ranges  Temp:  [96.4  F (35.8  C)-98.6  F (37  C)] 98.6  F (37  C)  Pulse:  [67-77] 67  Resp:  [17-18] 18  BP: (136-154)/(74-84) 136/74  SpO2:  [96 %-100 %] 98 %  I/O last 3 completed shifts:  In: 349.7 [P.O.:160; IV Piggyback:189.7]  Out: 2500 [Urine:2500]    General: Pleasant elderly male, appears older than stated age. Awake and interactive. No acute distress. Seated on the edge of the bed.  Skin: Warm, dry, and intact " without rashes or lesions.   Head: Normocephalic and atraumatic.   EENT: Chronic hemifacial spasm again noted. Small hordeolum to left medial lower lid without surrounding erythema or edema. Persistent whitish coating to tongue. No apparent mucositis.   Cardiac: Regular rate and rhythm.   Respiratory: No signs of respiratory distress or accessory muscle use. Lungs CTAB.  Abd: Soft, symmetric, and non-tender without distension. BS present and normoactive.   Extremities: 2+ pitting edema to bilateral LE above sock line. No calf tenderness. Extremities are thin, decreased muscle bulk and tone.  Neuro: A&Ox3 with normal speech. No dysarthria. Memory and thought process preserved. Chronic hemifacial spasm. No tremors. No myoclonus. Normal gait when seen ambulating in halls.   Psych: Appropriate mood and affect.     Medications     - MEDICATION INSTRUCTIONS -         celecoxib  200 mg Oral BID     Chemotherapy Infusing-Continuous Infusion   Does not apply Q8H     [START ON 1/11/2022] dextrose 5% water  10-20 mL Intracatheter Daily at 8 pm     [START ON 1/11/2022] dextrose 5% water  10-20 mL Intracatheter Daily at 8 pm     enoxaparin ANTICOAGULANT  40 mg Subcutaneous Q24H     [START ON 1/11/2022] filgrastim (NEUPOGEN/GRANIX) intravenous  5 mcg/kg (Treatment Plan Recorded) Intravenous Daily at 8 pm     heparin lock flush  5-20 mL Intracatheter Q24H     heparin lock flush  5-20 mL Intracatheter Q24H     hydrocortisone  10 mg Oral Daily     hydrocortisone  20 mg Oral QAM     ifosfamide (IFEX) infusion  1,350 mg/m2 (Treatment Plan Recorded) Intravenous Q24H     levothyroxine  75 mcg Oral QAM     [START ON 1/10/2022] mesna (MESNEX) infusion  1,350 mg/m2 (Treatment Plan Recorded) Intravenous Once     metroNIDAZOLE   Topical BID     nystatin  1,000,000 Units Oral 4x Daily     OLANZapine  5 mg Oral At Bedtime     ondansetron  8 mg Oral Q8H     pantoprazole  40 mg Oral QAM AC     phosphorus tablet 250 mg  500 mg Oral BID      potassium chloride  20 mEq Oral Daily     sodium chloride (PF)  10-40 mL Intracatheter Q8H     sodium chloride (PF)  10-40 mL Intracatheter Q8H     sodium chloride (PF)  10-40 mL Intracatheter Q8H     cholecalciferol  25 mcg Oral Daily       Data   Results for orders placed or performed during the hospital encounter of 01/04/22 (from the past 24 hour(s))   CBC with platelets differential    Narrative    The following orders were created for panel order CBC with platelets differential.  Procedure                               Abnormality         Status                     ---------                               -----------         ------                     CBC with platelets and d...[[id number]]  Abnormal            Final result                 Please view results for these tests on the individual orders.   Comprehensive metabolic panel   Result Value Ref Range    Sodium 143 133 - 144 mmol/L    Potassium 3.6 3.4 - 5.3 mmol/L    Chloride 112 (H) 94 - 109 mmol/L    Carbon Dioxide (CO2) 27 20 - 32 mmol/L    Anion Gap 4 3 - 14 mmol/L    Urea Nitrogen 17 7 - 30 mg/dL    Creatinine 0.90 0.66 - 1.25 mg/dL    Calcium 8.7 8.5 - 10.1 mg/dL    Glucose 86 70 - 99 mg/dL    Alkaline Phosphatase 147 40 - 150 U/L    AST 21 0 - 45 U/L    ALT 24 0 - 70 U/L    Protein Total 5.6 (L) 6.8 - 8.8 g/dL    Albumin 2.4 (L) 3.4 - 5.0 g/dL    Bilirubin Total 0.3 0.2 - 1.3 mg/dL    GFR Estimate 90 >60 mL/min/1.73m2   Magnesium   Result Value Ref Range    Magnesium 2.2 1.6 - 2.3 mg/dL   Phosphorus   Result Value Ref Range    Phosphorus 3.6 2.5 - 4.5 mg/dL   CBC with platelets and differential   Result Value Ref Range    WBC Count 5.7 4.0 - 11.0 10e3/uL    RBC Count 2.95 (L) 4.40 - 5.90 10e6/uL    Hemoglobin 8.5 (L) 13.3 - 17.7 g/dL    Hematocrit 27.4 (L) 40.0 - 53.0 %    MCV 93 78 - 100 fL    MCH 28.8 26.5 - 33.0 pg    MCHC 31.0 (L) 31.5 - 36.5 g/dL    RDW 19.2 (H) 10.0 - 15.0 %    Platelet Count 191 150 - 450 10e3/uL    % Neutrophils 67 %    %  Lymphocytes 16 %    % Monocytes 13 %    % Eosinophils 2 %    % Basophils 1 %    % Immature Granulocytes 1 %    NRBCs per 100 WBC 0 <1 /100    Absolute Neutrophils 3.8 1.6 - 8.3 10e3/uL    Absolute Lymphocytes 0.9 0.8 - 5.3 10e3/uL    Absolute Monocytes 0.8 0.0 - 1.3 10e3/uL    Absolute Eosinophils 0.1 0.0 - 0.7 10e3/uL    Absolute Basophils 0.0 0.0 - 0.2 10e3/uL    Absolute Immature Granulocytes 0.0 <=0.4 10e3/uL    Absolute NRBCs 0.0 10e3/uL

## 2022-01-09 NOTE — PLAN OF CARE
1900 - 2300 VSS, afebrile. A&Ox4. Denies pain, shortness of breath. Mild nausea, adequately managed with scheduled zofran. Voiding spontaneously, had a BM today. Ambulated nursing unit x2, up ad kaitlynn. Day 5 CIVI ifos/ mesna hung to infuse over 24 hours, infusing per PICC. Positive blood return assessed q4 hours. Cap and tubing changed with new bag.    2300 - 0700 Resting between cares. VSS over night. No acute events. Update given to oncoming RN at the bedside.

## 2022-01-09 NOTE — PLAN OF CARE
"[id number]    /81 (BP Location: Left arm)   Pulse 71   Temp (!) 96.6  F (35.9  C) (Oral)   Resp 18   Ht 1.702 m (5' 7\")   Wt 67.1 kg (147 lb 14.9 oz)   SpO2 100%   BMI 23.17 kg/m      Day 4 Ifos/Mesna infusing, brisk blood returns, next chemo due tonight around 2300. Neuros intact, \"baseline\" intermittently has periods of mild tremors when holding something heavy, no tremors noted on assessment.     Hypertensive in the 140s, not within parameters for PRN med. Afebrile. LS clear, on room air. Denies pain and SOB. Had persistent nausea this morning despite PRN IV Compazine. Received a dose of Emend, and this was effective. PO thrush improving. Mild edmea in legs. Voiding spontaneously, intermittently saving urine, educated the importance to use urinal to monitor his output. UpAdLib. Continue with POC.   "

## 2022-01-10 LAB
ALBUMIN SERPL-MCNC: 2.4 G/DL (ref 3.4–5)
ALP SERPL-CCNC: 143 U/L (ref 40–150)
ALT SERPL W P-5'-P-CCNC: 32 U/L (ref 0–70)
ANION GAP SERPL CALCULATED.3IONS-SCNC: 5 MMOL/L (ref 3–14)
AST SERPL W P-5'-P-CCNC: 38 U/L (ref 0–45)
BASOPHILS # BLD AUTO: 0 10E3/UL (ref 0–0.2)
BASOPHILS NFR BLD AUTO: 0 %
BILIRUB SERPL-MCNC: 0.4 MG/DL (ref 0.2–1.3)
BUN SERPL-MCNC: 19 MG/DL (ref 7–30)
C DIFF TOX B STL QL: NEGATIVE
CALCIUM SERPL-MCNC: 9 MG/DL (ref 8.5–10.1)
CHLORIDE BLD-SCNC: 114 MMOL/L (ref 94–109)
CO2 SERPL-SCNC: 24 MMOL/L (ref 20–32)
CREAT SERPL-MCNC: 0.92 MG/DL (ref 0.66–1.25)
EOSINOPHIL # BLD AUTO: 0.2 10E3/UL (ref 0–0.7)
EOSINOPHIL NFR BLD AUTO: 3 %
ERYTHROCYTE [DISTWIDTH] IN BLOOD BY AUTOMATED COUNT: 18.9 % (ref 10–15)
GFR SERPL CREATININE-BSD FRML MDRD: 88 ML/MIN/1.73M2
GLUCOSE BLD-MCNC: 87 MG/DL (ref 70–99)
HCT VFR BLD AUTO: 27.2 % (ref 40–53)
HGB BLD-MCNC: 8.4 G/DL (ref 13.3–17.7)
IMM GRANULOCYTES # BLD: 0 10E3/UL
IMM GRANULOCYTES NFR BLD: 1 %
LYMPHOCYTES # BLD AUTO: 0.9 10E3/UL (ref 0.8–5.3)
LYMPHOCYTES NFR BLD AUTO: 18 %
MAGNESIUM SERPL-MCNC: 2.3 MG/DL (ref 1.6–2.3)
MCH RBC QN AUTO: 29 PG (ref 26.5–33)
MCHC RBC AUTO-ENTMCNC: 30.9 G/DL (ref 31.5–36.5)
MCV RBC AUTO: 94 FL (ref 78–100)
MONOCYTES # BLD AUTO: 0.5 10E3/UL (ref 0–1.3)
MONOCYTES NFR BLD AUTO: 10 %
NEUTROPHILS # BLD AUTO: 3.4 10E3/UL (ref 1.6–8.3)
NEUTROPHILS NFR BLD AUTO: 68 %
NRBC # BLD AUTO: 0 10E3/UL
NRBC BLD AUTO-RTO: 0 /100
PHOSPHATE SERPL-MCNC: 2.9 MG/DL (ref 2.5–4.5)
PLATELET # BLD AUTO: 181 10E3/UL (ref 150–450)
POTASSIUM BLD-SCNC: 3.3 MMOL/L (ref 3.4–5.3)
POTASSIUM BLD-SCNC: 3.8 MMOL/L (ref 3.4–5.3)
PROT SERPL-MCNC: 5.6 G/DL (ref 6.8–8.8)
RBC # BLD AUTO: 2.9 10E6/UL (ref 4.4–5.9)
SODIUM SERPL-SCNC: 143 MMOL/L (ref 133–144)
WBC # BLD AUTO: 4.9 10E3/UL (ref 4–11)

## 2022-01-10 PROCEDURE — 250N000011 HC RX IP 250 OP 636: Performed by: PHYSICIAN ASSISTANT

## 2022-01-10 PROCEDURE — 36592 COLLECT BLOOD FROM PICC: CPT | Performed by: INTERNAL MEDICINE

## 2022-01-10 PROCEDURE — 250N000011 HC RX IP 250 OP 636: Performed by: INTERNAL MEDICINE

## 2022-01-10 PROCEDURE — 250N000013 HC RX MED GY IP 250 OP 250 PS 637: Performed by: PHYSICIAN ASSISTANT

## 2022-01-10 PROCEDURE — 120N000002 HC R&B MED SURG/OB UMMC

## 2022-01-10 PROCEDURE — 250N000013 HC RX MED GY IP 250 OP 250 PS 637: Performed by: INTERNAL MEDICINE

## 2022-01-10 PROCEDURE — 84100 ASSAY OF PHOSPHORUS: CPT | Performed by: PHYSICIAN ASSISTANT

## 2022-01-10 PROCEDURE — 83735 ASSAY OF MAGNESIUM: CPT | Performed by: PHYSICIAN ASSISTANT

## 2022-01-10 PROCEDURE — 80053 COMPREHEN METABOLIC PANEL: CPT | Performed by: PHYSICIAN ASSISTANT

## 2022-01-10 PROCEDURE — 85025 COMPLETE CBC W/AUTO DIFF WBC: CPT | Performed by: PHYSICIAN ASSISTANT

## 2022-01-10 PROCEDURE — 87493 C DIFF AMPLIFIED PROBE: CPT | Performed by: PHYSICIAN ASSISTANT

## 2022-01-10 PROCEDURE — 36592 COLLECT BLOOD FROM PICC: CPT | Performed by: PHYSICIAN ASSISTANT

## 2022-01-10 PROCEDURE — 84132 ASSAY OF SERUM POTASSIUM: CPT | Performed by: INTERNAL MEDICINE

## 2022-01-10 PROCEDURE — 99233 SBSQ HOSP IP/OBS HIGH 50: CPT | Mod: FS | Performed by: INTERNAL MEDICINE

## 2022-01-10 PROCEDURE — 258N000003 HC RX IP 258 OP 636: Performed by: PHYSICIAN ASSISTANT

## 2022-01-10 RX ORDER — FLUCONAZOLE 200 MG/1
200 TABLET ORAL DAILY
Status: DISCONTINUED | OUTPATIENT
Start: 2022-01-10 | End: 2022-01-11 | Stop reason: HOSPADM

## 2022-01-10 RX ORDER — LOPERAMIDE HCL 2 MG
2 CAPSULE ORAL 4 TIMES DAILY PRN
Status: DISCONTINUED | OUTPATIENT
Start: 2022-01-10 | End: 2022-01-11 | Stop reason: HOSPADM

## 2022-01-10 RX ORDER — POTASSIUM CHLORIDE 750 MG/1
40 TABLET, EXTENDED RELEASE ORAL ONCE
Status: COMPLETED | OUTPATIENT
Start: 2022-01-10 | End: 2022-01-10

## 2022-01-10 RX ADMIN — NYSTATIN 1000000 UNITS: 100000 SUSPENSION ORAL at 09:41

## 2022-01-10 RX ADMIN — ONDANSETRON HYDROCHLORIDE 8 MG: 8 TABLET, FILM COATED ORAL at 20:21

## 2022-01-10 RX ADMIN — ONDANSETRON HYDROCHLORIDE 8 MG: 8 TABLET, FILM COATED ORAL at 13:40

## 2022-01-10 RX ADMIN — HYDROCORTISONE 10 MG: 10 TABLET ORAL at 16:33

## 2022-01-10 RX ADMIN — CELECOXIB 200 MG: 200 CAPSULE ORAL at 09:39

## 2022-01-10 RX ADMIN — PANTOPRAZOLE SODIUM 40 MG: 40 TABLET, DELAYED RELEASE ORAL at 09:39

## 2022-01-10 RX ADMIN — Medication 5 ML: at 06:37

## 2022-01-10 RX ADMIN — PROCHLORPERAZINE MALEATE 5 MG: 5 TABLET ORAL at 09:39

## 2022-01-10 RX ADMIN — CELECOXIB 200 MG: 200 CAPSULE ORAL at 23:35

## 2022-01-10 RX ADMIN — DIBASIC SODIUM PHOSPHATE, MONOBASIC POTASSIUM PHOSPHATE AND MONOBASIC SODIUM PHOSPHATE 500 MG: 852; 155; 130 TABLET ORAL at 05:21

## 2022-01-10 RX ADMIN — POTASSIUM CHLORIDE 40 MEQ: 750 TABLET, EXTENDED RELEASE ORAL at 09:40

## 2022-01-10 RX ADMIN — LOPERAMIDE HYDROCHLORIDE 2 MG: 2 CAPSULE ORAL at 18:30

## 2022-01-10 RX ADMIN — METRONIDAZOLE: 7.5 GEL TOPICAL at 20:32

## 2022-01-10 RX ADMIN — SODIUM CHLORIDE, PRESERVATIVE FREE 5 ML: 5 INJECTION INTRAVENOUS at 13:53

## 2022-01-10 RX ADMIN — LEVOTHYROXINE SODIUM 75 MCG: 0.05 TABLET ORAL at 05:21

## 2022-01-10 RX ADMIN — CHOLECALCIFEROL TAB 25 MCG (1000 UNIT) 25 MCG: 25 TAB at 09:39

## 2022-01-10 RX ADMIN — OLANZAPINE 5 MG: 5 TABLET, FILM COATED ORAL at 23:33

## 2022-01-10 RX ADMIN — FLUCONAZOLE 200 MG: 200 TABLET ORAL at 11:48

## 2022-01-10 RX ADMIN — ENOXAPARIN SODIUM 40 MG: 40 INJECTION SUBCUTANEOUS at 20:21

## 2022-01-10 RX ADMIN — METRONIDAZOLE: 7.5 GEL TOPICAL at 09:41

## 2022-01-10 RX ADMIN — NYSTATIN 1000000 UNITS: 100000 SUSPENSION ORAL at 13:40

## 2022-01-10 RX ADMIN — PROCHLORPERAZINE MALEATE 5 MG: 5 TABLET ORAL at 16:24

## 2022-01-10 RX ADMIN — HYDROCORTISONE 20 MG: 10 TABLET ORAL at 09:39

## 2022-01-10 RX ADMIN — MESNA 2510 MG: 100 INJECTION, SOLUTION INTRAVENOUS at 20:21

## 2022-01-10 RX ADMIN — NYSTATIN 1000000 UNITS: 100000 SUSPENSION ORAL at 20:21

## 2022-01-10 RX ADMIN — DIBASIC SODIUM PHOSPHATE, MONOBASIC POTASSIUM PHOSPHATE AND MONOBASIC SODIUM PHOSPHATE 500 MG: 852; 155; 130 TABLET ORAL at 18:30

## 2022-01-10 RX ADMIN — ONDANSETRON HYDROCHLORIDE 8 MG: 8 TABLET, FILM COATED ORAL at 05:21

## 2022-01-10 RX ADMIN — NYSTATIN 1000000 UNITS: 100000 SUSPENSION ORAL at 23:32

## 2022-01-10 RX ADMIN — POTASSIUM CHLORIDE 20 MEQ: 750 TABLET, EXTENDED RELEASE ORAL at 09:40

## 2022-01-10 ASSESSMENT — ACTIVITIES OF DAILY LIVING (ADL)
ADLS_ACUITY_SCORE: 4

## 2022-01-10 ASSESSMENT — MIFFLIN-ST. JEOR: SCORE: 1359.08

## 2022-01-10 NOTE — PLAN OF CARE
"[id number]    /82 (BP Location: Left arm)   Pulse 89   Temp 97.2  F (36.2  C) (Oral)   Resp 18   Ht 1.702 m (5' 7\")   Wt 66.8 kg (147 lb 3.6 oz)   SpO2 99%   BMI 23.06 kg/m      Day 5 Ifosfamide/Mesna infusing via PICC, brisk blood returns. Neuros at baseline.    VSS. LS clear, on room air. Denies pain and SOB. Nausea fluctuates with intensity throughout the day, provided PO Compazine x1 and scheduled Zofran. Appetite diminished. Compression stocking placed by lymphedema. UpAdLib. Continue with POC.  "

## 2022-01-10 NOTE — PROGRESS NOTES
Nursing Focus: Chemotherapy  D: Positive blood return via PICC. Insertion site is clean/dry/intact, dressing intact with no complaints of pain.  Urine output is recorded in intake in Doc Flowsheet.    I: Premedications given per order (see electronic medical administration record). Dose #6 of ifos/ mesna started to infuse over 24 hours/minutes. Reviewed pt teaching on chemotherapy side effects.  Pt denies need for further teaching. Chemotherapy double checked per protocol by two chemotherapy competent RN's.   A: Tolerating procedure well. Denies nausea and or pain.   P: Continue to monitor urine output and symptoms of nausea. Screen for symptoms of toxicity.

## 2022-01-10 NOTE — PROGRESS NOTES
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 Course complicated by chemo-induced nausea, loose stools and oral candidiasis.     TODAY: D6 Doxil/Ifos  - Continue chemotherapy and close monitoring for s/sx of neurotoxicity  - Ongoing thrush worsening despite Nystatin ? add Fluconazole 200 mg x7 days. Monitor LFTs closely.   - Continue scheduled and PRN anti-emetics for ongoing CINV  - Replete K per standing protocol. Continue PTA K and Phos supplements.   - C diff ordered given new loose stools, follow-up. If negative will order Imodium. If positive would stop chemo.   - Pt endorses \"puffy\" L eye this morning, known sty present to lower lid. Denies irritation or pain. No erythema on exam. No visual changes. Monitor closely. Denies need for eye drops at present.   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 Wolff. Patient presented to his PCP in 03/2021 with chest/left shoulder and back pain that led to imaging which revealed multiple lung mets, included a left pleural mass. There were difficulties in getting diagnostic biopsy; eventually, biopsy of the mediastinal mass (5/20/21) revealed a poorly characterized malignant spindle cell neoplasm with high PD-L1 expression (90%), Ki-67 70%. 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 Insufficient material to send [facility]. Baseline imaging (6/21/21) revealed progression of lung mets and left-sided " subdiaphragmatic mass, stable liver lesions. He was seen by ENT for hoarseness, which was attributed to left true vocal fold motion impairment from mediastinal mass. Given high PD-L1 expression, he was started on Keytruda (C1=6/21/21). Interval imaging (CT 8/2/21) revealed positive response to treatment. He received 6 cycles total, most recently C6=10/4/21. Unfortunately, restaging imaging (10/18/21) revealed interval increase in size of the LUQ/splenic mass; however, the mediastinal and left pleural mass were stable to slightly decreased in size. He met with Dr. Wolff, who recommended a change in therapy to Doxil/ifosfamide. He was admitted on 10/26/21 to receive Cycle #1 of Doxil/ifos which he tolerated reasonably well with no neurotoxicities, but did have moderate chemotherapy-induced nausea. After discharge he had significant mucositis, poor PO intake, nausea, and lyte derangements. Given adverse effects, Cycle 2 was delayed by 1 week and Doxil was dose reduced to 70 mg, or 40 mg/m2 (previously given 80 mg, or 45mg/m2), and ifosfamide was reduced by 10% (1500mg/m2 ? 1350mg/m2).   - Tolerated Cycle 2 Doxil/Ifos (D1=12/1/21) better, so plan to proceed with same dosing for cycle 3.   - PICC ordered on admission, remove at discharge. Port placement previously scheduled for 1/5/22 though canceled d/t this admission. Will need to rechedule port before Cycle 4. I have requested and outpatient order placed. Outpatient team working to arrange prior to next cycle.   - Outpatient team plans to restage with repeat imaging after Cycle 4.                                            Treatment Plan: Doxil + ifosfamide (C3D1=1/4/22).               Doxil 70mg IV once - D1                Ifosfamide/Mesna 1350 mg/m2 (2510 mg) CIVI - D1-6                Mesna 1350 mg/m2 (2510 mg) IV over 18 hrs - starting D7                Pre-meds: Emend 150 mg once Day 1 and Day 4; Zofran 8 mg Q8H                Neulasta injection - D8, scheduled  "at [facility] [place] on 1/13     # Cancer-related pain  - Continue PTA Celebrex 200 mg BID   - Continue PTA topical Bengay PRN at bedtime to left back      # Normocytic anemia  Noted intermittently. Likely related to chemo and underlying disease.   - Transfuse to maintain Hgb >7, will sign blood consent if indicated     CV  # CAD   Followed by Dr. Gigi Vernon at [facility]. He was noted to have coronary calcium on chest CT. CTA 9/29/21 with moderate mid LAD stenosis. Small to moderate caliber ramus branch with severe diffuse disease. Echo completed 9/29/21 with normal EF and no valvular dysfunction. Repeat ECHO on 10/27 with EF 60-65%, early diastolic dysfunction but otherwise no change from previous.  - PTA rosuvastatin 40 mg daily HELD for transaminitis  - Encourage outpatient cardiology follow-up as recommended     # H/o intermittent elevated blood pressures, stable  BPs ranging from normotensive to hypertensive (with SBP 140s-160) during previous admissions. He states that he runs higher at baseline (though this is based on clinic assessments as he does not typically monitor his BP at home). Appears recent intermittent clinic BPs sit at 140s-150s/80s-90s when seen in person, otherwise many of the visits are currently virtual. He is not on any antihypertensive medications PTA. Suspect he has undiagnosed essential HTN. Will monitor closely this admission.    - Would recommend outpatient PCP follow-up for further discussion of BP monitoring and management.   - Oral hydralazine available PRN (SBP >170, DBP >100)    GI  # Chemotherapy-induced nausea  Improved C2 with the following regimen, plan to continue this admission. Per patient, nausea typically \"kicks in\" around day 3 or 4.   - Scheduled Zofran Q8H  - PRN compazine and Ativan  - Emend 150 mg IV x1 on D1; re-dosed on D4  - Zyprexa 5 mg at bedtime which has been helpful in past     # Loose stools  In addition to nausea per above, patient endorses new loose " stools x2 and abdominal discomfort starting 1/10.   - C diff ordered; follow-up. If negative would order Imodium. If positive would start PO Vanco and stop chemo.     # Transaminitis, improved  First noted in late Nov: , , TBili WNL on 11/29/21. PTA statin was subsequently held. Per outpatient notes, he did not have any abdominal pain or other symptoms concerning for viral hepatitis. Discussed with Dr. Wolff, and it was discussed that this could be due to his statin and OK to proceed with chemotherapy as planned above. He also has known liver metastases which are likely contributing.   - Continue to hold PTA statin  - Follow LFTs daily, monitor closely especially now with adding -azole per below     # GERD  - Continue PTA omeprazole  - TUMS and Pepto Bismol available PRN     RENAL/FEN  # Hypervolemia, improving   Patient was noted on admission to have bilateral LE edema, 1+. Now increasing slightly throughout admission in the setting of continuous IV infusion. No crackles on auscultation or dyspnea complaints concerning for pulmonary edema. Weight actually trending down. Albumin low, 2.4, and suspect this is related to third-spacing.  - Lymphedema consulted for soft compression stockings, this was helpful for pt   - Hold off on diuresis given overall down-trend in weight  - Follow I/Os, daily weights    # H/o HUSSEIN, stable  Baseline Cr ~0.8 which is noted to transiently increased with previously chemo cycles, presumed d/t hypovolemia in the setting of nausea with poor PO intake. CR has remained at baseline throughout admission thus far.    - Continue with twice weekly IVF infusion appts OP  - Follow daily CMP  - No mIVF; bolus PRN.     # Hypophosphatemia, intermittent  # Hypokalemia, intermittent  Secondary to ifosfamide. Required additional phos and K replacement after discharge from C1 Doxil/Ifos. Lytes have remained stable/normal throughout admission thus far while on daily supplements.   - Follow  "Phos and K daily while inpatient  - Continue PTA PO Phos and KCl   - Replete additional per standing protocol     ENDO  # Panhypopituitarism  # Macroadenoma of the pituitary gland  Diagnosed in 2010. Found to have a pituitary macroadenoma, 1.9 cm in largest dimension. Underwent hypophysectomy on 8/23/10. Subsequent presumptive pituitary deficiency, on empiric meds for adrenal insufficiency and thyroid. Previously followed by Dr. Bill Mccall; now has established care with Dr. Jamir Grant on 11/11/21.  - Continue PTA Levothyroxine  - Continue PTA Hydrocortisone 15 mg qAM and 10 mg qPM  - Continue endocrinology follow-up as scheduled (next scheduled for 5/16/22)     ENT  # L eye swelling  # Acute hordeolum of the left lower lid  Sty noted to inner lower left eyelid on admission. History of similar in the past, not typically necessitating treatment. No associated symptoms of pain, visual blurring, or irritation. Then with new L eye swelling and \"puffiness\" noted 1/10. Pt denies any pain, redness, irritation or vision changes. Sty to lower lid present. Do drainage, erythema, injection or tenderness on exam.   - Monitor clinically  - Warm compresses PRN  - Could consider eye drops though patient declines need at present  - Low threshold to consult ophthalmalogy if worsening symptoms     # Vocal cord dysfunction  Manifested as hoarseness. Seen by ENT (Dr. Kyree Bearden) on 6/21/21 and noted to have left true vocal fold motion impairment from mediastinal mass. Underwent injection of ProLaryn (filler/bulking agent) on 7/1/21, which reportedly has been minimally beneficial. Seen in follow-up by Dr. Bearden on 9/1/21 and noted to have minimal improvement; he was then referred to [facility] ([facility]).   - No acute inpatient management issues  - Continue outpatient ENT follow-up as previously scheduled (next scheduled for 3/10/22)     # Oral candidiasis  # H/o mucositis  Significant 2/2 Doxil. Noted after C1 " chemo, then better following C2 dose-reduction. On admission patient reports thrush again starting a couple days prior to admission. Restarted Nystatin at home on 1/3. Initially denied any mouth sores though endorses poor taste and tongue coating. Possibly new developing mucositis lesions to R inner cheek on 1/6. Still with good PO intake. Though with ongoing and worsening thrush as of 1/10 despite Nystatin.   - Continue salt/soda swishes  - Continue Nystatin QID with this cycle  - MMW, Doxepin available PRN   - Start Fluconazole 200 mg daily x7 days (x1/10)     DERM  # History of palmar-plantar erythrodysesthesia (Hand-Foot Syndrome), stable  Due to Doxil. Noted following C2.  - Iced hands and mouth during Doxil infusion, tolerated well   - Will continue this practice with subsequent cycles     # Rosacea  - Continue PTA metronidazole gel     PSYCH  # History of anxiety/depression  Situational, related to malaise/illness. Previously placed health psych referral.  - No acute inpatient management needs     FEN:  - Encouraged PO fluids, bolus PRN  - PRN lyte replacement  - RDAT     Prophy/Misc:  - VTE: ppx Lovenox 40mg daily during admission   - GI/PUD: PTA omeprazole, Tums PRN  - Bowels: Senna and Miralax PRN  - Activity: Consider consulting PT if indicated     Code: FULL  Disposition: Admitted to Oncology Team 3 service for scheduled chemotherapy 7-day stay. Anticipate discharge ~1/11 pending chemo timing and barring complications.    Follow-up: Follows with Dr. Wolff  - Twice weekly labs/infusion appts are scheduled at [facility] follow-up visit is scheduled on 1/12  - Neulasta infusion added to already-scheduled infusion appt on 1/13 at [place]   - Have requested repeat scheduling of port placement, outpatient team aware and working on it prior to next cycle of chemo.       Patient and plan of care was discussed with attending physician Dr. Cheema.    Izabella Owens PA-C ([place])  Hematology/Oncology  Ph:  "[phone], Pager: [phone]    >50 minutes spent on the date of the encounter doing the following: chart review, review of test results, interpretation of tests, patient visit, documentation, discussions with staff, family and interdisciplinary teams.     Interval History   Ongoing and intermittent nausea continues, no episode of emesis yesterday. This morning patient is tired and nausea is currently under control. Though he reports appetite is very poor, mostly due to poor taste with chemo and ongoing thrush. Patient feels thrush has continued to get worse despite ongoing Nystatin, patient is a little down about this. Will need K replacement today in addition to daily supplement. LE swelling improved on exam today, pt states compression stockings were helpful. Proceed with D6 chemo today, D7 Mesna to start later tonight. Anticipate tentative discharge tomorrow afternoon/evening pending chemo timing. All questions answered.      Interval hx: later in morning, around ~11 AM patient endorses eye \"puffiness\". Reports he noticed it this morning upon waking up and states he feels like it is already getting a little better. Denies any pain/irritation, redness, drainage or visual changes. Tells me is has \"gotten like this before at home\". Discussed with patient will monitor closely and asked him to let us know of any changes. Also reporting loose stools x2 today and mild abdominal discomfort in addition to nausea above. Will obtain c diff testing and proceed with Imodium if negative. Pt voices understanding.     A comprehensive review of systems was obtained and is negative other than noted here or in the HPI.     Physical Exam   Temp: 97  F (36.1  C) Temp src: Oral BP: (!) 147/80 Pulse: 84   Resp: 18 SpO2: 99 % O2 Device: None (Room air)    Vitals:    01/08/22 0825 01/09/22 0831 01/10/22 0939   Weight: 67.1 kg (147 lb 14.9 oz) 66.8 kg (147 lb 3.6 oz) 65.5 kg (144 lb 8 oz)     Vital Signs with Ranges  Temp:  [97  F (36.1 "  C)-98.6  F (37  C)] 97  F (36.1  C)  Pulse:  [67-89] 84  Resp:  [18-20] 18  BP: (130-153)/(74-91) 147/80  SpO2:  [98 %-100 %] 99 %  I/O last 3 completed shifts:  In: 725.2 [P.O.:320; I.V.:30; IV Piggyback:375.2]  Out: 1800 [Urine:1800]    General: Elderly male feeling poorly today, appears stated age. Awake and interactive. No acute distress. Seated on the edge of the bed. Fatigued though non-toxic appearing.   Skin: Warm, dry, and intact without rashes or lesions.   Head: Normocephalic and atraumatic.   EENT: Chronic hemifacial spasm again noted. Small hordeolum to left medial lower lid without surrounding erythema. Mild edema present to upper and lower lid today. L eye is non-tender, no erythema or drainage present, non-injected. Persistent and slightly worsening whitish coating to tongue compared to exam last week. No apparent mucositis.   Cardiac: Regular rate and rhythm.   Respiratory: No signs of respiratory distress or accessory muscle use. Lungs CTAB.  Abd: Soft, symmetric, and without distension. Mild generalized discomfort with palpation. BS present and normoactive.   Extremities: trace pitting edema to bilateral LE, sock line now absent, exam overall improved from last week. No calf tenderness. Extremities are thin, decreased muscle bulk and tone.  Neuro: A&Ox3 with normal speech. No dysarthria. Memory and thought process preserved. Chronic hemifacial spasm. No tremors. No myoclonus. Normal gait when seen ambulating in halls.   Psych: Appropriate mood and affect.     Medications     - MEDICATION INSTRUCTIONS -         celecoxib  200 mg Oral BID     Chemotherapy Infusing-Continuous Infusion   Does not apply Q8H     [START ON 1/11/2022] dextrose 5% water  10-20 mL Intracatheter Daily at 8 pm     [START ON 1/11/2022] dextrose 5% water  10-20 mL Intracatheter Daily at 8 pm     enoxaparin ANTICOAGULANT  40 mg Subcutaneous Q24H     [START ON 1/11/2022] filgrastim (NEUPOGEN/GRANIX) intravenous  5 mcg/kg  (Treatment Plan Recorded) Intravenous Daily at 8 pm     fluconazole  200 mg Oral Daily     heparin lock flush  5-20 mL Intracatheter Q24H     heparin lock flush  5-20 mL Intracatheter Q24H     hydrocortisone  10 mg Oral Daily     hydrocortisone  20 mg Oral QAM     ifosfamide (IFEX) infusion  1,350 mg/m2 (Treatment Plan Recorded) Intravenous Q24H     levothyroxine  75 mcg Oral QAM     mesna (MESNEX) infusion  1,350 mg/m2 (Treatment Plan Recorded) Intravenous Once     metroNIDAZOLE   Topical BID     nystatin  1,000,000 Units Oral 4x Daily     OLANZapine  5 mg Oral At Bedtime     ondansetron  8 mg Oral Q8H     pantoprazole  40 mg Oral QAM AC     phosphorus tablet 250 mg  500 mg Oral BID     potassium chloride  20 mEq Oral Daily     sodium chloride (PF)  10-40 mL Intracatheter Q8H     sodium chloride (PF)  10-40 mL Intracatheter Q8H     sodium chloride (PF)  10-40 mL Intracatheter Q8H     cholecalciferol  25 mcg Oral Daily       Data   Results for orders placed or performed during the hospital encounter of 01/04/22 (from the past 24 hour(s))   CBC with platelets differential    Narrative    The following orders were created for panel order CBC with platelets differential.  Procedure                               Abnormality         Status                     ---------                               -----------         ------                     CBC with platelets and d...[[id number]]  Abnormal            Final result                 Please view results for these tests on the individual orders.   Comprehensive metabolic panel   Result Value Ref Range    Sodium 143 133 - 144 mmol/L    Potassium 3.3 (L) 3.4 - 5.3 mmol/L    Chloride 114 (H) 94 - 109 mmol/L    Carbon Dioxide (CO2) 24 20 - 32 mmol/L    Anion Gap 5 3 - 14 mmol/L    Urea Nitrogen 19 7 - 30 mg/dL    Creatinine 0.92 0.66 - 1.25 mg/dL    Calcium 9.0 8.5 - 10.1 mg/dL    Glucose 87 70 - 99 mg/dL    Alkaline Phosphatase 143 40 - 150 U/L    AST 38 0 - 45 U/L    ALT 32 0  - 70 U/L    Protein Total 5.6 (L) 6.8 - 8.8 g/dL    Albumin 2.4 (L) 3.4 - 5.0 g/dL    Bilirubin Total 0.4 0.2 - 1.3 mg/dL    GFR Estimate 88 >60 mL/min/1.73m2   Magnesium   Result Value Ref Range    Magnesium 2.3 1.6 - 2.3 mg/dL   Phosphorus   Result Value Ref Range    Phosphorus 2.9 2.5 - 4.5 mg/dL   CBC with platelets and differential   Result Value Ref Range    WBC Count 4.9 4.0 - 11.0 10e3/uL    RBC Count 2.90 (L) 4.40 - 5.90 10e6/uL    Hemoglobin 8.4 (L) 13.3 - 17.7 g/dL    Hematocrit 27.2 (L) 40.0 - 53.0 %    MCV 94 78 - 100 fL    MCH 29.0 26.5 - 33.0 pg    MCHC 30.9 (L) 31.5 - 36.5 g/dL    RDW 18.9 (H) 10.0 - 15.0 %    Platelet Count 181 150 - 450 10e3/uL    % Neutrophils 68 %    % Lymphocytes 18 %    % Monocytes 10 %    % Eosinophils 3 %    % Basophils 0 %    % Immature Granulocytes 1 %    NRBCs per 100 WBC 0 <1 /100    Absolute Neutrophils 3.4 1.6 - 8.3 10e3/uL    Absolute Lymphocytes 0.9 0.8 - 5.3 10e3/uL    Absolute Monocytes 0.5 0.0 - 1.3 10e3/uL    Absolute Eosinophils 0.2 0.0 - 0.7 10e3/uL    Absolute Basophils 0.0 0.0 - 0.2 10e3/uL    Absolute Immature Granulocytes 0.0 <=0.4 10e3/uL    Absolute NRBCs 0.0 10e3/uL

## 2022-01-10 NOTE — PLAN OF CARE
1900 - 2300 VSS, afebrile. A&Ox4. Denies pain, shortness of breath. Moderate nausea this evening, managed with scheduled zofran and zyprexa. Voiding spontaneously. Ambulated nursing unit x1, up ad kaitlynn. Day 6 CIVI ifos/ mesna hung to infuse over 24 hours, infusing per PICC. Positive blood return assessed q4 hours. Patient states thrush seems to have worsened today, is feeling discouraged by this. Patchy white lesions noted over full surface of tongue.     2300 - 0700 Resting between cares. VSS over night. No acute events. Update given to oncoming RN at the bedside.

## 2022-01-10 NOTE — PLAN OF CARE
"[id number]    BP (!) 152/88 (BP Location: Left arm)   Pulse 74   Temp 97.2  F (36.2  C) (Oral)   Resp 18   Ht 1.702 m (5' 7\")   Wt 65.5 kg (144 lb 8 oz)   SpO2 100%   BMI 22.63 kg/m      Day 6 Ifos/Mesna, brisk blood returns. Plan to start CIVI Mesna tonight.     Hypertensive, not within parameters for PRN Hydralazine. Afebrile. Denies  SOB. Not feeling the best today. Nausea continues to fluctuate with intensity and having mild abdominal cramps/loose stools. C.Diff NEG. Declined any pain intervention. PRN PO Compazine given x2. Increased thrush, PO Fluconazole started + scheduled Nystatin. Eating small meals. Compression stockings on, trace edema, aware to take socks off during the night. UpAdLib. Continue with POC.  "

## 2022-01-11 ENCOUNTER — APPOINTMENT (OUTPATIENT)
Dept: OCCUPATIONAL THERAPY | Facility: CLINIC | Age: 72
DRG: 847 | End: 2022-01-11
Attending: INTERNAL MEDICINE
Payer: MEDICARE

## 2022-01-11 VITALS
BODY MASS INDEX: 22.52 KG/M2 | DIASTOLIC BLOOD PRESSURE: 89 MMHG | HEART RATE: 85 BPM | WEIGHT: 143.5 LBS | TEMPERATURE: 96.8 F | RESPIRATION RATE: 18 BRPM | SYSTOLIC BLOOD PRESSURE: 149 MMHG | OXYGEN SATURATION: 100 % | HEIGHT: 67 IN

## 2022-01-11 PROBLEM — B37.0 THRUSH: Status: ACTIVE | Noted: 2022-01-11

## 2022-01-11 LAB
ALBUMIN SERPL-MCNC: 2.5 G/DL (ref 3.4–5)
ALP SERPL-CCNC: 141 U/L (ref 40–150)
ALT SERPL W P-5'-P-CCNC: 39 U/L (ref 0–70)
ANION GAP SERPL CALCULATED.3IONS-SCNC: 7 MMOL/L (ref 3–14)
AST SERPL W P-5'-P-CCNC: 43 U/L (ref 0–45)
BASOPHILS # BLD AUTO: 0 10E3/UL (ref 0–0.2)
BASOPHILS NFR BLD AUTO: 0 %
BILIRUB SERPL-MCNC: 0.6 MG/DL (ref 0.2–1.3)
BUN SERPL-MCNC: 17 MG/DL (ref 7–30)
CALCIUM SERPL-MCNC: 9.1 MG/DL (ref 8.5–10.1)
CHLORIDE BLD-SCNC: 115 MMOL/L (ref 94–109)
CO2 SERPL-SCNC: 20 MMOL/L (ref 20–32)
CORTIS SERPL-MCNC: 41.5 UG/DL (ref 4–22)
CREAT SERPL-MCNC: 0.87 MG/DL (ref 0.66–1.25)
EOSINOPHIL # BLD AUTO: 0.1 10E3/UL (ref 0–0.7)
EOSINOPHIL NFR BLD AUTO: 3 %
ERYTHROCYTE [DISTWIDTH] IN BLOOD BY AUTOMATED COUNT: 18.7 % (ref 10–15)
GFR SERPL CREATININE-BSD FRML MDRD: >90 ML/MIN/1.73M2
GLUCOSE BLD-MCNC: 92 MG/DL (ref 70–99)
HCT VFR BLD AUTO: 28.6 % (ref 40–53)
HGB BLD-MCNC: 8.7 G/DL (ref 13.3–17.7)
IMM GRANULOCYTES # BLD: 0 10E3/UL
IMM GRANULOCYTES NFR BLD: 0 %
LYMPHOCYTES # BLD AUTO: 0.9 10E3/UL (ref 0.8–5.3)
LYMPHOCYTES NFR BLD AUTO: 18 %
MAGNESIUM SERPL-MCNC: 2.3 MG/DL (ref 1.6–2.3)
MCH RBC QN AUTO: 28.7 PG (ref 26.5–33)
MCHC RBC AUTO-ENTMCNC: 30.4 G/DL (ref 31.5–36.5)
MCV RBC AUTO: 94 FL (ref 78–100)
MONOCYTES # BLD AUTO: 0.2 10E3/UL (ref 0–1.3)
MONOCYTES NFR BLD AUTO: 5 %
NEUTROPHILS # BLD AUTO: 3.6 10E3/UL (ref 1.6–8.3)
NEUTROPHILS NFR BLD AUTO: 74 %
NRBC # BLD AUTO: 0 10E3/UL
NRBC BLD AUTO-RTO: 0 /100
PHOSPHATE SERPL-MCNC: 2.5 MG/DL (ref 2.5–4.5)
PLATELET # BLD AUTO: 171 10E3/UL (ref 150–450)
POTASSIUM BLD-SCNC: 3.3 MMOL/L (ref 3.4–5.3)
PROT SERPL-MCNC: 5.9 G/DL (ref 6.8–8.8)
RBC # BLD AUTO: 3.03 10E6/UL (ref 4.4–5.9)
SODIUM SERPL-SCNC: 142 MMOL/L (ref 133–144)
WBC # BLD AUTO: 4.9 10E3/UL (ref 4–11)

## 2022-01-11 PROCEDURE — 85025 COMPLETE CBC W/AUTO DIFF WBC: CPT | Performed by: PHYSICIAN ASSISTANT

## 2022-01-11 PROCEDURE — 36592 COLLECT BLOOD FROM PICC: CPT | Performed by: PHYSICIAN ASSISTANT

## 2022-01-11 PROCEDURE — 82533 TOTAL CORTISOL: CPT | Performed by: INTERNAL MEDICINE

## 2022-01-11 PROCEDURE — 250N000013 HC RX MED GY IP 250 OP 250 PS 637: Performed by: PHYSICIAN ASSISTANT

## 2022-01-11 PROCEDURE — 83735 ASSAY OF MAGNESIUM: CPT | Performed by: PHYSICIAN ASSISTANT

## 2022-01-11 PROCEDURE — 84100 ASSAY OF PHOSPHORUS: CPT | Performed by: PHYSICIAN ASSISTANT

## 2022-01-11 PROCEDURE — 250N000011 HC RX IP 250 OP 636: Performed by: PHYSICIAN ASSISTANT

## 2022-01-11 PROCEDURE — 36592 COLLECT BLOOD FROM PICC: CPT | Performed by: INTERNAL MEDICINE

## 2022-01-11 PROCEDURE — 258N000003 HC RX IP 258 OP 636: Performed by: PHYSICIAN ASSISTANT

## 2022-01-11 PROCEDURE — 80053 COMPREHEN METABOLIC PANEL: CPT | Performed by: PHYSICIAN ASSISTANT

## 2022-01-11 PROCEDURE — 250N000011 HC RX IP 250 OP 636: Performed by: INTERNAL MEDICINE

## 2022-01-11 PROCEDURE — 250N000013 HC RX MED GY IP 250 OP 250 PS 637: Performed by: INTERNAL MEDICINE

## 2022-01-11 PROCEDURE — 99239 HOSP IP/OBS DSCHRG MGMT >30: CPT | Mod: FS | Performed by: INTERNAL MEDICINE

## 2022-01-11 PROCEDURE — 250N000009 HC RX 250: Performed by: INTERNAL MEDICINE

## 2022-01-11 PROCEDURE — 97535 SELF CARE MNGMENT TRAINING: CPT | Mod: GO

## 2022-01-11 RX ORDER — NYSTATIN 100000/ML
500000 SUSPENSION, ORAL (FINAL DOSE FORM) ORAL 4 TIMES DAILY
Qty: 473 ML | Refills: 0 | COMMUNITY
Start: 2022-01-11 | End: 2022-03-09

## 2022-01-11 RX ORDER — FLUCONAZOLE 200 MG/1
200 TABLET ORAL DAILY
Qty: 5 TABLET | Refills: 0 | Status: SHIPPED | OUTPATIENT
Start: 2022-01-11 | End: 2022-01-12

## 2022-01-11 RX ORDER — POTASSIUM CHLORIDE 750 MG/1
40 TABLET, EXTENDED RELEASE ORAL ONCE
Status: DISCONTINUED | OUTPATIENT
Start: 2022-01-11 | End: 2022-01-11 | Stop reason: HOSPADM

## 2022-01-11 RX ORDER — POTASSIUM CHLORIDE 29.8 MG/ML
20 INJECTION INTRAVENOUS ONCE
Status: COMPLETED | OUTPATIENT
Start: 2022-01-11 | End: 2022-01-11

## 2022-01-11 RX ORDER — NYSTATIN 100000/ML
500000 SUSPENSION, ORAL (FINAL DOSE FORM) ORAL 4 TIMES DAILY
Qty: 473 ML | Refills: 0 | Status: SHIPPED | OUTPATIENT
Start: 2022-01-11 | End: 2022-01-11

## 2022-01-11 RX ADMIN — POTASSIUM CHLORIDE 20 MEQ: 750 TABLET, EXTENDED RELEASE ORAL at 11:46

## 2022-01-11 RX ADMIN — HYDROCORTISONE 20 MG: 10 TABLET ORAL at 11:47

## 2022-01-11 RX ADMIN — SODIUM CHLORIDE 500 ML: 9 INJECTION, SOLUTION INTRAVENOUS at 09:56

## 2022-01-11 RX ADMIN — FAMOTIDINE 20 MG: 10 INJECTION, SOLUTION INTRAVENOUS at 14:21

## 2022-01-11 RX ADMIN — SODIUM CHLORIDE, PRESERVATIVE FREE 5 ML: 5 INJECTION INTRAVENOUS at 09:44

## 2022-01-11 RX ADMIN — ONDANSETRON HYDROCHLORIDE 8 MG: 8 TABLET, FILM COATED ORAL at 13:06

## 2022-01-11 RX ADMIN — PROCHLORPERAZINE EDISYLATE 5 MG: 5 INJECTION INTRAMUSCULAR; INTRAVENOUS at 09:35

## 2022-01-11 RX ADMIN — ONDANSETRON HYDROCHLORIDE 8 MG: 8 TABLET, FILM COATED ORAL at 05:14

## 2022-01-11 RX ADMIN — METRONIDAZOLE: 7.5 GEL TOPICAL at 11:47

## 2022-01-11 RX ADMIN — LEVOTHYROXINE SODIUM 75 MCG: 0.05 TABLET ORAL at 05:14

## 2022-01-11 RX ADMIN — DIBASIC SODIUM PHOSPHATE, MONOBASIC POTASSIUM PHOSPHATE AND MONOBASIC SODIUM PHOSPHATE 500 MG: 852; 155; 130 TABLET ORAL at 05:14

## 2022-01-11 RX ADMIN — PROCHLORPERAZINE EDISYLATE 5 MG: 5 INJECTION INTRAMUSCULAR; INTRAVENOUS at 10:11

## 2022-01-11 RX ADMIN — FOSAPREPITANT 150 MG: 150 INJECTION, POWDER, LYOPHILIZED, FOR SOLUTION INTRAVENOUS at 13:55

## 2022-01-11 RX ADMIN — FLUCONAZOLE 200 MG: 200 TABLET ORAL at 11:47

## 2022-01-11 RX ADMIN — POTASSIUM CHLORIDE 20 MEQ: 29.8 INJECTION, SOLUTION INTRAVENOUS at 13:20

## 2022-01-11 ASSESSMENT — MIFFLIN-ST. JEOR: SCORE: 1354.54

## 2022-01-11 ASSESSMENT — ACTIVITIES OF DAILY LIVING (ADL)
ADLS_ACUITY_SCORE: 4
ADLS_ACUITY_SCORE: 4
ADLS_ACUITY_SCORE: 6
ADLS_ACUITY_SCORE: 4
ADLS_ACUITY_SCORE: 4
ADLS_ACUITY_SCORE: 6
ADLS_ACUITY_SCORE: 4
ADLS_ACUITY_SCORE: 6
ADLS_ACUITY_SCORE: 4
ADLS_ACUITY_SCORE: 4
ADLS_ACUITY_SCORE: 6
ADLS_ACUITY_SCORE: 6
ADLS_ACUITY_SCORE: 4

## 2022-01-11 NOTE — PLAN OF CARE
1900 - 2300 VSS, afebrile. A&Ox4. Denies pain, shortness of breath. Moderate nausea this evening, managed with scheduled zofran and zyprexa. Voiding spontaneously. Ambulated nursing unit x1, up ad kaitlynn. CIVI ifos/ mesna complete, mesna started to infuse per PICC over 16 hours. Patient states his appetite is poor, that everything tastes bad d/t thrush.      2300 - 0700 Resting between cares. Hypertensive over night, not meeting parameters for hydralazine. No acute events. Patient states clindamycin was more helpful than fluconazole last time he had thrush, wants primary team to be made aware. Update given to oncoming RN at the bedside.

## 2022-01-11 NOTE — PLAN OF CARE
Occupational Therapy Discharge Summary    Reason for therapy discharge:    All goals and outcomes met, no further needs identified.    Progress towards therapy goal(s). See goals on Care Plan in [facility] electronic health record for goal details.  Goals met    Therapy recommendation(s):    No further therapy is recommended.  Home with continued use of size F Comperm compression stockings for gentle management of lower extremity edema.

## 2022-01-11 NOTE — PLAN OF CARE
"[id number]    BP (!) 149/89 (BP Location: Left arm)   Pulse 85   Temp 96.8  F (36  C) (Oral)   Resp 18   Ht 1.702 m (5' 7\")   Wt 65.1 kg (143 lb 8 oz)   SpO2 100%   BMI 22.48 kg/m      Mesna finished infusing.     Hypertensive. Afebrile. Denies pain and SOB. Persistant nausea this AM, declined to take his scheduled meds until later. PRN IV Compazine given x1 with no relief, additional 5mg IV Compazine provided and semi-effective. K-3.3, patient declined PO replacement d/t not feeling well. IV K 20 mEq infused instead per Dr. Cheema. Emend and IV push Pepcid provided prior to discharge. Noted redness on left side of face towards the end of shift. PICC successfully pulled.     Discharge  D: Orders for discharge and outpatient medications written.    I: Home medications and return to clinic schedule reviewed with patient. Discharge instructions and parameters for calling Health Care Provider reviewed. Patient left at 1500 accompanied by Wife.     A: Patient/family verbalized understanding and was ready for discharge.     P: Patient instructed to  medications in Pharmacy. Follow up as scheduled AVS.    "

## 2022-01-11 NOTE — DISCHARGE SUMMARY
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 Dose-reduction per previous cycle was continued this cycle as well. Patient tolerated Doxil well (ice applied) without any complications or recurrence of hand-foot syndrome. Ifos was completed without any signs or symptoms of neurotoxicity, though patient did again develop significant chemo-induced nausea (no vomiting) despite anti-emetics plan (Emend, scheduled Zofran/Zyprexa, prn Compazine). Emend was re-dosed on D4 and D7 of chemo regimen. Course also complicated by worsening thrush. This was present on admission and Nystatin was resumed, however, continued to worsen and ultimately course of Fluconazole was initiated. K was intermittently low towards discharge and repleted per protocol. He continues on daily supplement. Loose stools were noted on day prior to discharge, c diff was negative.     On day of discharge, patient continues to feel nauseated. As above, will repeat Emend prior to discharge. Was additionally given GI cocktail. Pt did endorse some light headed feeling when up and moving around, likely 2/2 poor PO intake with nausea and thrush. Will give 500 mL bolus prior to  "discharge. Despite feeling \"crummy\", patient voices request to discharge this afternoon and that he is confident he will feel better at home in his own environment. He reports loose stools have improved today and he has not had any further episodes today (since 3 episodes yesterday). He continues to endorse coating to tongue though denies any mouth sores or pain, just ongoing poor taste. We discussed plan for close follow-up per below. Patient voices agreement with the plan and is looking forward to discharging later this afternoon once Mes[place] is complete.     Recs for outpatient provider:    Intermittent hypokalemia while inpatient. Continues with daily supplement and discussed increasing for several days after discharge though patient declines. Follow-up with lab work.     Blood pressures continue to be intermittently elevated (also seen with previous admissions). Consider anti-hypertensive medication if ongoing outpatient.     Follow-up for improvement in thrush. Continues on Fluconazole 7d course (through 1/16).     Follow-up for continued improvement in nausea, loose stools (c diff negative).     I have requested rescheduling of port placement prior to next cycle of chemo (outpatient schedulers aware and attempting to coordinate)    Monitor LFTs closely with lab work given h/o transaminitis and starting Fluconazole    Follow-up:    1/12: Previously scheduled virtual [facility] visit    1/13: Labs and Neulasta    Ongoing twice weekly labs and possible fluids infusions are scheduled starting 1/17    Medication Highlights:    Added Fluconazole 7-day course for thrush (through 1/16)    Given Emend on D1, D4 and D7 of chemo [facility]    Hospital Course   Ifrah Huitron was admitted on 1/4/2022.  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 Wolff. Patient presented to his PCP in 03/2021 with chest/left shoulder and " back pain that led to imaging which revealed multiple lung mets, included a left pleural mass. There were difficulties in getting diagnostic biopsy; eventually, biopsy of the mediastinal mass (5/20/21) revealed a poorly characterized malignant spindle cell neoplasm with high PD-L1 expression (90%), Ki-67 70%. 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 Insufficient material to send [facility]. Baseline imaging (6/21/21) revealed progression of lung mets and left-sided subdiaphragmatic mass, stable liver lesions. He was seen by ENT for hoarseness, which was attributed to left true vocal fold motion impairment from mediastinal mass. Given high PD-L1 expression, he was started on Keytruda (C1=6/21/21). Interval imaging (CT 8/2/21) revealed positive response to treatment. He received 6 cycles total, most recently C6=10/4/21. Unfortunately, restaging imaging (10/18/21) revealed interval increase in size of the LUQ/splenic mass; however, the mediastinal and left pleural mass were stable to slightly decreased in size. He met with Dr. Wolff, who recommended a change in therapy to Doxil/ifosfamide. He was admitted on 10/26/21 to receive Cycle #1 of Doxil/ifos which he tolerated reasonably well with no neurotoxicities, but did have moderate chemotherapy-induced nausea. After discharge he had significant mucositis, poor PO intake, nausea, and lyte derangements. Given adverse effects, Cycle 2 was delayed by 1 week and Doxil was dose reduced to 70 mg, or 40 mg/m2 (previously given 80 mg, or 45mg/m2), and ifosfamide was reduced by 10% (1500mg/m2 ? 1350mg/m2).   - Tolerated Cycle 2 Doxil/Ifos (D1=12/1/21) better, so plan to proceed with same dosing for cycle 3.   - PICC ordered on admission, remove at discharge. Port placement previously scheduled for 1/5/22 though canceled d/t this admission. Will need to rechedule port before Cycle 4. I have  requested and outpatient order placed. Outpatient team working to arrange prior to next cycle.   - Outpatient team plans to restage with repeat imaging after Cycle 4.                                            Treatment Plan: Doxil + ifosfamide (C3D1=1/4/22).               Doxil 70mg IV once - D1; complete               Ifosfamide/Mesna 1350 mg/m2 (2510 mg) CIVI - D1-6; complete               Mesna 1350 mg/m2 (2510 mg) IV over 18 hrs - starting D7; complete                Pre-meds: Emend 150 mg once Day 1, Day 4 and Day 7; Zofran 8 mg Q8H                Neulasta injection - D8, scheduled at [facility] on 1/13     # Cancer-related pain; stable  - Continue PTA Celebrex 200 mg BID   - Continue PTA topical Bengay PRN at bedtime to left back      # Normocytic anemia  Noted intermittently. Likely related to chemo and underlying disease.   - Transfuse to maintain Hgb >7, will sign blood consent if indicated     CV  # CAD   Followed by Dr. Gigi Vernon at [facility]. He was noted to have coronary calcium on chest CT. CTA 9/29/21 with moderate mid LAD stenosis. Small to moderate caliber ramus branch with severe diffuse disease. Echo completed 9/29/21 with normal EF and no valvular dysfunction. Repeat ECHO on 10/27 with EF 60-65%, early diastolic dysfunction but otherwise no change from previous.  - PTA rosuvastatin 40 mg daily HELD for transaminitis  - Encourage outpatient cardiology follow-up as recommended     # H/o intermittent elevated blood pressures, stable  BPs ranging from normotensive to hypertensive (with SBP 140s-160) during previous admissions. He states that he runs higher at baseline (though this is based on clinic assessments as he does not typically monitor his BP at home). Appears recent intermittent clinic BPs sit at 140s-150s/80s-90s when seen in person, otherwise many of the visits are currently virtual. He is not on any antihypertensive medications PTA. Suspect he has undiagnosed essential  "HTN. Will monitor closely this admission.    - Would recommend outpatient PCP follow-up for further discussion of BP monitoring and management.   - Oral hydralazine available PRN (SBP >170, DBP >100); though did not meet parameters     GI  # Chemotherapy-induced nausea  Improved C2 with the following regimen, plan to continue this admission. Per patient, nausea typically \"kicks in\" around day 3 or 4.   - Scheduled Zofran Q8H  - PRN compazine and Ativan  - Emend 150 mg IV x1 on D1; re-dosed on D4 and D7 prior to discharge  - Zyprexa 5 mg at bedtime which has been helpful in past   - Also given GI cocktail prior to discharge     # Loose stools; improving   In addition to nausea per above, patient endorses new loose stools x2 and abdominal discomfort starting 1/10. Overall with improvement on 1/11.   - C diff negative, Imodium prn      # Transaminitis, improved  First noted in late Nov: , , TBili WNL on 11/29/21. PTA statin was subsequently held. Per outpatient notes, he did not have any abdominal pain or other symptoms concerning for viral hepatitis. Discussed with Dr. Wolff, and it was discussed that this could be due to his statin and OK to proceed with chemotherapy as planned above. He also has known liver metastases which are likely contributing.   - Continue to hold PTA statin  - Follow LFTs daily, monitor closely especially now with adding -azole per below     # GERD  - Continue PTA omeprazole  - TUMS and Pepto Bismol available PRN      RENAL/FEN  # Hypervolemia, improving   Patient was noted on admission to have bilateral LE edema, 1+. Now increasing slightly throughout admission in the setting of continuous IV infusion. No crackles on auscultation or dyspnea complaints concerning for pulmonary edema. Weight actually trending down. Albumin low, 2.4, and suspect this is related to third-spacing.  - Lymphedema consulted for soft compression stockings, this was helpful for pt   - Hold off on " "diuresis given overall down-trend in weight  - Follow I/Os, daily weights     # H/o HUSSEIN, stable  Baseline Cr ~0.8 which is noted to transiently increased with previously chemo cycles, presumed d/t hypovolemia in the setting of nausea with poor PO intake. CR has remained at baseline throughout admission thus far.    - Continue with twice weekly IVF infusion appts OP  - Follow daily CMP  - No mIVF; bolus PRN.      # Hypophosphatemia, intermittent  # Hypokalemia, intermittent  Secondary to ifosfamide. Required additional phos and K replacement after discharge from  Doxil/Ifos. Lytes began trending down toward end of admission despite daily supplement.   - Follow Phos and K daily while inpatient; follow with twice weekly labs outpatient   - Continue PTA PO Phos and KCl  - Replete additional per standing protocol     ENDO  # Panhypopituitarism  # Macroadenoma of the pituitary gland  Diagnosed in 2010. Found to have a pituitary macroadenoma, 1.9 cm in largest dimension. Underwent hypophysectomy on 8/23/10. Subsequent presumptive pituitary deficiency, on empiric meds for adrenal insufficiency and thyroid. Previously followed by Dr. Bill Mccall; now has established care with Dr. Jamir Grant on 11/11/21.  - Continue PTA Levothyroxine  - Continue PTA Hydrocortisone 15 mg qAM and 10 mg qPM  - Continue endocrinology follow-up as scheduled (next scheduled for 5/16/22)     ENT  # L eye swelling; resolved   # Acute hordeolum of the left lower lid  Sty noted to inner lower left eyelid on admission. History of similar in the past, not typically necessitating treatment. No associated symptoms of pain, visual blurring, or irritation. Then with new L eye swelling and \"puffiness\" noted 1/10. Pt denies any pain, redness, irritation or vision changes. Sty to lower lid present. Do drainage, erythema, injection or tenderness on exam. Resolved by 1/11.   - Monitor clinically  - Warm compresses PRN  - Could consider eye drops " though patient declines need at present  - Low threshold to place ophthalmalogy referral outpatient if worsening symptoms      # Vocal cord dysfunction  Manifested as hoarseness. Seen by ENT (Dr. Kyree Bearden) on 6/21/21 and noted to have left true vocal fold motion impairment from mediastinal mass. Underwent injection of ProLaryn (filler/bulking agent) on 7/1/21, which reportedly has been minimally beneficial. Seen in follow-up by Dr. Bearden on 9/1/21 and noted to have minimal improvement; he was then referred to [facility] ([facility]   - No acute inpatient management issues  - Continue outpatient ENT follow-up as previously scheduled (next scheduled for 3/10/22)     # Oral candidiasis  # H/o mucositis  Significant 2/2 Doxil. Noted after C1 chemo, then better following C2 dose-reduction. On admission patient reports thrush again starting a couple days prior to admission. Restarted Nystatin at home on 1/3. Initially denied any mouth sores though endorses poor taste and tongue coating. Possibly new developing mucositis lesions to R inner cheek on 1/6. Still with good PO intake. Though with ongoing and worsening thrush as of 1/10 despite Nystatin.   - Continue salt/soda swishes  - Nystatin held with ongoing Fluconazole; resume as appropriate in outpatient setting   - MMW, Doxepin available PRN   - Fluconazole 200 mg daily x7 days; continue on discharge through 1/16     DERM  # History of palmar-plantar erythrodysesthesia (Hand-Foot Syndrome), stable  Due to Doxil. Noted following C2.  - Iced hands and mouth during Doxil infusion, tolerated well   - Will continue this practice with subsequent cycles     # Rosacea  - Continue PTA metronidazole gel     PSYCH  # History of anxiety/depression  Situational, related to malaise/illness. Previously placed health psych referral.  - No acute inpatient management needs     FEN:  - Encouraged PO fluids, bolus PRN  - PRN lyte replacement  - RDAT     Prophy/Misc:  -  VTE: ppx Lovenox 40mg daily during admission   - GI/PUD: PTA omeprazole, Tums PRN  - Bowels: Senna and Miralax PRN     Code: FULL  Disposition: Admitted to Oncology Team 3 service for scheduled chemotherapy 7-day stay. Anticipate discharge ~1/11 pending chemo timing and barring complications.    Follow-up: Follows with Dr. Wolff  - Twice weekly labs/infusion appts are scheduled at [place] starting 1/17  - [facility] hospital follow-up visit is scheduled on 1/12  - Neulasta infusion added to already-scheduled infusion appt on 1/13 at [place]   - Have requested repeat scheduling of port placement, outpatient team aware and working on it prior to next cycle of chemo.       Patient and plan of care was discussed with attending physician Dr. Cheema.     Izabella Owens PA-C ([place])  Hematology/Oncology  Pager: [phone]    Pending Results   Unresulted Labs Ordered in the Past 30 Days of this Admission     No orders found from 12/5/2021 to 1/5/2022.        Code Status   Full Code    Primary Care Physician   Sukhdev Kohler    General: Elderly male feeling poorly today, appears stated age. Awake and interactive. No acute distress. Resting on L side. Fatigued though non-toxic appearing.   Skin: Warm, dry, and intact without rashes or lesions.   Head: Normocephalic and atraumatic.   EENT: Chronic hemifacial spasm again noted. Small hordeolum to left medial lower lid without surrounding erythema. Swelling resolved today. L eye is non-tender, no erythema or drainage present, non-injected. Persistent whitish coating to tongue. No apparent mucositis.   Cardiac: Regular rate and rhythm.   Respiratory: No signs of respiratory distress or accessory muscle use. Lungs CTAB.  Abd: Soft, symmetric, and without distension. Mild generalized discomfort with palpation. BS present and normoactive.   Extremities: trace pitting edema to bilateral LE, sock line now absent, exam overall improved from last week. No calf tenderness. Extremities are thin, decreased  muscle bulk and tone.  Neuro: A&Ox3 with normal speech. No dysarthria. Memory and thought process preserved. Chronic hemifacial spasm. No tremors. No myoclonus. Normal gait when seen ambulating in halls.   Psych: Appropriate mood and affect.     Time Spent on this Encounter   Izabella DEE PA-C, personally saw the patient today and spent greater than 30 minutes discharging this patient.    Discharge Disposition   Discharged to home  Condition at discharge: Stable    Consultations This Hospital Stay   VASCULAR ACCESS FOR PICC PLACEMENT ADULT IP CONSULT  LYMPHEDEMA THERAPY IP CONSULT    Discharge Orders      IR Chest Port Placement > 5 Yrs of Age     Comprehensive metabolic panel     Phosphorus     Care Coordination Referral      Reason for your hospital stay    Admission for scheduled chemo for your sarcoma     Activity    Your activity upon discharge: activity as tolerated     When to contact your care team    [facility] triage line at [phone] for temp > or = 100.4, uncontrolled nausea/vomiting/diarrhea/constipation, unrelieved pain, bleeding not relieved with pressure, dizziness, chest pain, shortness of breath, loss of consciousness, and any new or concerning symptoms.     Discharge Instructions    Continue Fluconazole for a total of 7 days (through 1/16) for thrush. Hold Nystatin until after Fluconazole. If you still have ongoing tongue coating following Fluconazole can restart Nystatin.     Follow Up and recommended labs and tests    - 1/12: [facility] virtual visit  - 1/13: Neulasta and labs ([place])  - Twice weekly labs and possible infusions scheduled starting 1/17     Diet    Follow this diet upon discharge: Regular     CBC with Platelets & Differential     Discharge Medications   Current Discharge Medication List      START taking these medications    Details   fluconazole (DIFLUCAN) 200 MG tablet Take 1 tablet (200 mg) by mouth daily for 5 days  Qty: 5 tablet, Refills: 0    Associated  Diagnoses: Thrush         CONTINUE these medications which have CHANGED    Details   nystatin (MYCOSTATIN) 397667 UNIT/ML suspension Take 5 mLs (500,000 Units) by mouth 4 times daily Hold while taking Fluconazole, could resume after Fluconazole if you have ongoing coating on tongue.  Qty: 473 mL, Refills: 0    Associated Diagnoses: Thrush         CONTINUE these medications which have NOT CHANGED    Details   acetaminophen (TYLENOL) 500 MG tablet Take 500-1,000 mg by mouth every 6 hours as needed for mild pain      calcium carbonate (TUMS) 500 MG chewable tablet Take 1 tablet (500 mg) by mouth 3 times daily as needed for heartburn      celecoxib (CELEBREX) 200 MG capsule Take 1 capsule (200 mg) by mouth 2 times daily. Note this is a new a strength! Only one capsule at a time!  Qty: 60 capsule, Refills: 3    Comments: This prescription was filled on 10/20/2021. Any refills authorized will be placed on file.  Associated Diagnoses: Cancer associated pain      cholecalciferol (VITAMIN D-1000 MAX ST) 1000 units TABS Take 1,000 Units by mouth daily       doxepin (SINEQUAN) 10 MG/ML (HIGH CONC) solution Take 2 mLs (20 mg) by mouth every 4 hours as needed for other (mouth pain) . Mix with equal parts tap water. Swish and hold in mouth ~60 seconds then spit out.  Qty: 118 mL, Refills: 3    Associated Diagnoses: Stomatitis and mucositis; Spindle cell sarcoma (H)      guaiFENesin-codeine (GUAIFENESIN AC) 100-10 MG/5ML syrup Take 10 mLs by mouth every 4 hours as needed for cough  Qty: 118 mL, Refills: 0    Associated Diagnoses: Vocal fold paralysis, left; Dysphonia; Muscle tension dysphonia; Mesothelioma, malignant (H); Cough      hydrocortisone (CORTEF) 10 MG tablet Take 20 mg in the morning and 10 mg in the afternoon  Qty: 270 tablet, Refills: 3    Associated Diagnoses: Hypopituitarism (H)      levothyroxine (SYNTHROID/LEVOTHROID) 75 MCG tablet Take 1 tablet (75 mcg) by mouth every morning  Qty: 90 tablet, Refills: 3     "Associated Diagnoses: Hypopituitarism (H)      LORazepam (ATIVAN) 0.5 MG tablet Take 1 tablet (0.5 mg) by mouth every 4 hours as needed for nausea or vomiting . Caution: causes sedation.  Qty: 30 tablet, Refills: 0    Associated Diagnoses: Chemotherapy-induced nausea      Menthol-Methyl Salicylate (DALIA MALIK GREASELESS) cream Apply topically every 6 hours as needed      metroNIDAZOLE (METROGEL) 0.75 % external gel Apply topically 2 times daily  Qty: 45 g, Refills: 11    Associated Diagnoses: Rosacea      OLANZapine (ZYPREXA) 5 MG tablet Take 1 tablet (5 mg) by mouth At Bedtime Continue until your nausea resolves  Qty: 30 tablet, Refills: 1    Associated Diagnoses: Chemotherapy-induced nausea      omeprazole (PRILOSEC) 20 MG DR capsule Take 2 capsules (40 mg) by mouth daily  Qty: 60 capsule, Refills: 1      ondansetron (ZOFRAN) 4 MG tablet Take 1-2 tablets (4-8 mg) by mouth every 8 hours as needed for nausea  Qty: 40 tablet, Refills: 1    Associated Diagnoses: Chemotherapy-induced nausea      phosphorus tablet 250 mg (PHOSPHA 250 NEUTRAL) 250 MG per tablet Take 2 tablets (500 mg) by mouth 2 times daily  Qty: 60 tablet, Refills: 1    Associated Diagnoses: Hypophosphatemia      potassium chloride ER (K-TAB) 20 MEQ CR tablet Take 1 tablet (20 mEq) by mouth daily  Qty: 30 tablet, Refills: 1    Associated Diagnoses: Hypokalemia      prochlorperazine (COMPAZINE) 5 MG tablet Take 1 tablet (5 mg) by mouth every 6 hours as needed for nausea or vomiting . Caution: causes sedation.  Qty: 30 tablet, Refills: 1    Associated Diagnoses: Chemotherapy-induced nausea      triamcinolone (KENALOG) 0.1 % external cream Apply topically 2 times daily       Insulin Syringe-Needle U-100 27.5G X 5/8\" 2 ML MISC 1 each daily as needed (with solucortef for adrenal crisis)  Qty: 10 each, Refills: 1    Associated Diagnoses: Adrenal insufficiency (H)           Allergies   Allergies   Allergen Reactions     Penicillins Hives and Swelling        "     Data   Most Recent 3 CBC's:Recent Labs   Lab Test 01/11/22  0703 01/10/22  0637 01/09/22  0657   WBC 4.9 4.9 5.7   HGB 8.7* 8.4* 8.5*   MCV 94 94 93    181 191      Most Recent 3 BMP's:  Recent Labs   Lab Test 01/11/22  0703 01/10/22  1401 01/10/22  0637 01/09/22  0657     --  143 143   POTASSIUM 3.3* 3.8 3.3* 3.6   CHLORIDE 115*  --  114* 112*   CO2 20  --  24 27   BUN 17  --  19 17   CR 0.87  --  0.92 0.90   ANIONGAP 7  --  5 4   COTY 9.1  --  9.0 8.7   GLC 92  --  87 86     Most Recent 2 LFT's:  Recent Labs   Lab Test 01/11/22  0703 01/10/22  0637   AST 43 38   ALT 39 32   ALKPHOS 141 143   BILITOTAL 0.6 0.4     Most Recent INR's and Anticoagulation Dosing History:  Anticoagulation Dose History     Recent Dosing and Labs Latest Ref Rng & Units 4/29/2021 12/1/2021 1/4/2022    INR 0.85 - 1.15 1.13 1.14 1.12        Most Recent 3 Troponin's:  Recent Labs   Lab Test 08/02/21  1458 05/23/19  0909   TROPI  --  <0.015   TROPONIN <0.015  --      Most Recent Cholesterol Panel:No lab results found.  Most Recent 6 Bacteria Isolates From Any Culture (See EPIC Reports for Culture Details):  Recent Labs   Lab Test 05/20/21  1254 05/23/19  0943 05/23/19  0911   CULT Culture negative after 4 weeks  No anaerobes isolated  Since this specimen was not transported in the proper anaerobic transport media, the   absence of anaerobes in this culture does not rule out the presence of anaerobes in this   specimen.    No growth No growth No growth     Most Recent TSH, T4 and A1c Labs:  Recent Labs   Lab Test 11/18/21  0855 10/04/21  1348   TSH  --  0.74   T4 1.46  --      Results for orders placed or performed during the hospital encounter of 11/22/21   CT Chest/Abdomen/Pelvis w Contrast    Narrative    CT CHEST/ABDOMEN/PELVIS W CONTRAST 11/22/2021 12:19 PM    HISTORY: Spindle cell sarcoma. Mesothelioma. Follow-up.    CONTRAST:  74 mL [id number].    TECHNIQUE: CT of the chest, abdomen, and pelvis is performed with  IV  contrast.    Routine evaluated structures in the chest include the lungs,  mediastinal structures, pleura, and chest wall.    Routine assessed structures in the abdomen include the liver, spleen,  pancreas, adrenal glands, and kidneys. Also assessed are the  retroperitoneum, gastrointestinal tract, and the abdominal wall.    Intrapelvic anatomy is also assessed.    Radiation dose for this scan is reduced using automated exposure  control, adjustment of the mA and/or kV according to patient size, or  iterative reconstruction technique.    COMPARISON: 10/18/2021.    FINDINGS:    Chest: The previously seen left pleural effusion is smaller. Trace  left pleural fluid remains. The previously seen irregular shaped  anterior mediastinal mass is stable to slightly smaller compared to  the prior study. It measures 2.3 x 1.9 x 4.5 cm. Comparable  measurements on the prior study are 2.6 x 2.3 x 4.4 cm.  A nodule at  the anterior pericardial margin on axial image 35 is unchanged at 0.9  cm. Focal pleural thickening anterolaterally in the left hemithorax on  axial image 24 is stable.    Abdomen: A lobulated left upper quadrant mass intimately related to  the left hemidiaphragm and with suspected splenic invasion is again  seen. It has not significantly changed from the prior study. It  measures 7.7 x 5.8 x 4.7 cm. Comparable measurements on the prior  study are 7.4 x 5.8 x 4.6 cm. Subcentimeter liver lesions continue to  be stable.  Small cysts are seen in each kidney are unchanged.    Pelvis: No significant abnormality is demonstrated.      Impression    IMPRESSION:  Overall stable neoplastic disease. No new findings.    BRANDIE BRANHAM MD         SYSTEM ID:  [id number]

## 2022-01-11 NOTE — PATIENT INSTRUCTIONS
"  After Visit Summary    Patient: Ifrah Huitron  Date of Visit: 1/7/2022      Breathing:    Place your hand on your belly, so you can feel the rise and fall (inflation and deflation) of the abdominal area as you inhale and exhale:   o Make sure you inhale often enough to have enough air to support your voice.    (It may be more often than you used to breathe, because you're leaking a little right now!)  o Inhale = Inflate; Exhale = Deflate  o Ssshhhhhhh on exhale, silence on inhale    (We started with this exercise, but you don't need to do it forever)    Voice:    Humming to m-sentences   o Start with a hum, gliding up and down             Up and down like a siren  o Feel the sensation of \"forward focus\" (the sensation that your voice is in your mouth and face, not your throat)    o Continue to sense that the airflow carries your sound out; you don't need to push!  o Progress to \"my oh my oh my\" and then to the m-sentences (sent by email)  o     B-sentences   o Feel the energy at your lips, not in your throat  o Listen for a clear, clean sound; no raspiness      Remember to use those pitches, and chant the sentences; they will help you get the best sound  o Let me know if you need to be reminded which pitches they were          "

## 2022-01-12 ENCOUNTER — PATIENT OUTREACH (OUTPATIENT)
Dept: NURSING | Facility: CLINIC | Age: 72
End: 2022-01-12
Payer: MEDICARE

## 2022-01-12 ENCOUNTER — VIRTUAL VISIT (OUTPATIENT)
Dept: ONCOLOGY | Facility: CLINIC | Age: 72
End: 2022-01-12
Attending: PHYSICIAN ASSISTANT
Payer: MEDICARE

## 2022-01-12 DIAGNOSIS — C49.9 SARCOMA (H): Primary | ICD-10-CM

## 2022-01-12 DIAGNOSIS — L71.9 ROSACEA: Primary | ICD-10-CM

## 2022-01-12 DIAGNOSIS — R11.0 CHEMOTHERAPY-INDUCED NAUSEA: ICD-10-CM

## 2022-01-12 DIAGNOSIS — T45.1X5A CHEMOTHERAPY-INDUCED NAUSEA: ICD-10-CM

## 2022-01-12 DIAGNOSIS — E87.6 HYPOKALEMIA: ICD-10-CM

## 2022-01-12 DIAGNOSIS — B37.0 THRUSH: ICD-10-CM

## 2022-01-12 PROCEDURE — 99215 OFFICE O/P EST HI 40 MIN: CPT | Mod: 95 | Performed by: PHYSICIAN ASSISTANT

## 2022-01-12 PROCEDURE — G0463 HOSPITAL OUTPT CLINIC VISIT: HCPCS | Mod: PN,RTG | Performed by: PHYSICIAN ASSISTANT

## 2022-01-12 RX ORDER — METRONIDAZOLE 10 MG/G
GEL TOPICAL DAILY
Qty: 30 G | Refills: 0 | Status: SHIPPED | OUTPATIENT
Start: 2022-01-12 | End: 2022-01-01

## 2022-01-12 RX ORDER — POTASSIUM CHLORIDE 1500 MG/1
20 TABLET, EXTENDED RELEASE ORAL DAILY
Qty: 30 TABLET | Refills: 1 | Status: SHIPPED | OUTPATIENT
Start: 2022-01-12 | End: 2022-01-13

## 2022-01-12 RX ORDER — ONDANSETRON 4 MG/1
4-8 TABLET, FILM COATED ORAL EVERY 8 HOURS PRN
Qty: 60 TABLET | Refills: 3 | Status: SHIPPED | OUTPATIENT
Start: 2022-01-12 | End: 2022-01-30

## 2022-01-12 RX ORDER — FLUCONAZOLE 200 MG/1
200 TABLET ORAL DAILY
Qty: 7 TABLET | Refills: 0 | Status: SHIPPED | OUTPATIENT
Start: 2022-01-12 | End: 2022-01-26

## 2022-01-12 RX ORDER — OLANZAPINE 5 MG/1
5 TABLET ORAL AT BEDTIME
Qty: 30 TABLET | Refills: 1 | Status: SHIPPED | OUTPATIENT
Start: 2022-01-12 | End: 2022-03-09

## 2022-01-12 ASSESSMENT — ACTIVITIES OF DAILY LIVING (ADL): DEPENDENT_IADLS:: INDEPENDENT

## 2022-01-12 NOTE — CONFIDENTIAL NOTE
Potassium Chloride Refill   Last prescribing provider: Maynor CHOW    Last clinic visit date: 1/12/22 Maynor CHOW      Any missed appointments or no-shows since last clinic visit?: No     Recommendations for requested medication (if none, N/A): Copied from chart note 1/12/22 Maynor CHOW  Mather was called today for follow-up. He is doing much better today compared to yesterday. Diarrhea resolved. Energy levels are improved. Nausea is minimal-did take Zofran this morning and Zyprexa last night. He is eating and drinking OK, taking his potassium and phos supplements      Next clinic visit date: 1/19/22 Maynor CHOW    Any other pertinent information (if none, N/A): N/A

## 2022-01-12 NOTE — PROGRESS NOTES
Ifrah is a 71 year old who is being evaluated via a billable video visit.      How would you like to obtain your AVS? MyChart  If the video visit is dropped, the invitation should be resent by: Send to e-mail at: [email or web address]  Will anyone else be joining your video visit? Yes-Wife        Magnus Triplett VF [phone]    Video Start Time: 12:35pm    Video-Visit Details    Type of service:  Video Visit    Video End Time: 1:35pm    Originating Location (pt. Location): Home    Distant Location (provider location):  [facility]     Platform used for Video Visit: [email or web address]

## 2022-01-13 ENCOUNTER — APPOINTMENT (OUTPATIENT)
Dept: LAB | Facility: CLINIC | Age: 72
End: 2022-01-13
Payer: MEDICARE

## 2022-01-13 ENCOUNTER — INFUSION THERAPY VISIT (OUTPATIENT)
Dept: INFUSION THERAPY | Facility: CLINIC | Age: 72
DRG: 847 | End: 2022-01-13
Attending: PHYSICIAN ASSISTANT
Payer: MEDICARE

## 2022-01-13 VITALS — DIASTOLIC BLOOD PRESSURE: 80 MMHG | TEMPERATURE: 97.8 F | SYSTOLIC BLOOD PRESSURE: 142 MMHG

## 2022-01-13 DIAGNOSIS — E87.6 HYPOKALEMIA: ICD-10-CM

## 2022-01-13 DIAGNOSIS — D70.1 CHEMOTHERAPY-INDUCED NEUTROPENIA (H): ICD-10-CM

## 2022-01-13 DIAGNOSIS — T45.1X5A CHEMOTHERAPY-INDUCED NEUTROPENIA (H): ICD-10-CM

## 2022-01-13 DIAGNOSIS — C49.9 SARCOMA (H): ICD-10-CM

## 2022-01-13 DIAGNOSIS — T45.1X5A CHEMOTHERAPY-INDUCED NAUSEA: ICD-10-CM

## 2022-01-13 DIAGNOSIS — R11.0 CHEMOTHERAPY-INDUCED NAUSEA: ICD-10-CM

## 2022-01-13 DIAGNOSIS — Z51.89 ENCOUNTER FOR OTHER SPECIFIED AFTERCARE: Primary | ICD-10-CM

## 2022-01-13 DIAGNOSIS — C45.9 MESOTHELIOMA, MALIGNANT (H): ICD-10-CM

## 2022-01-13 DIAGNOSIS — C49.9 SPINDLE CELL SARCOMA (H): ICD-10-CM

## 2022-01-13 LAB
ALBUMIN SERPL-MCNC: 2.8 G/DL (ref 3.4–5)
ALP SERPL-CCNC: 171 U/L (ref 40–150)
ALT SERPL W P-5'-P-CCNC: 48 U/L (ref 0–70)
ANION GAP SERPL CALCULATED.3IONS-SCNC: 8 MMOL/L (ref 3–14)
AST SERPL W P-5'-P-CCNC: 50 U/L (ref 0–45)
BASOPHILS # BLD MANUAL: 0.1 10E3/UL (ref 0–0.2)
BASOPHILS NFR BLD MANUAL: 1 %
BILIRUB SERPL-MCNC: 0.4 MG/DL (ref 0.2–1.3)
BUN SERPL-MCNC: 23 MG/DL (ref 7–30)
CALCIUM SERPL-MCNC: 9 MG/DL (ref 8.5–10.1)
CHLORIDE BLD-SCNC: 114 MMOL/L (ref 94–109)
CO2 SERPL-SCNC: 20 MMOL/L (ref 20–32)
CREAT SERPL-MCNC: 1.07 MG/DL (ref 0.66–1.25)
EOSINOPHIL # BLD MANUAL: 0.1 10E3/UL (ref 0–0.7)
EOSINOPHIL NFR BLD MANUAL: 2 %
ERYTHROCYTE [DISTWIDTH] IN BLOOD BY AUTOMATED COUNT: 18.2 % (ref 10–15)
GFR SERPL CREATININE-BSD FRML MDRD: 73 ML/MIN/1.73M2
GLUCOSE BLD-MCNC: 93 MG/DL (ref 70–99)
HCT VFR BLD AUTO: 28.2 % (ref 40–53)
HGB BLD-MCNC: 8.9 G/DL (ref 13.3–17.7)
LYMPHOCYTES # BLD MANUAL: 0.7 10E3/UL (ref 0.8–5.3)
LYMPHOCYTES NFR BLD MANUAL: 13 %
MAGNESIUM SERPL-MCNC: 2.6 MG/DL (ref 1.6–2.3)
MCH RBC QN AUTO: 29.2 PG (ref 26.5–33)
MCHC RBC AUTO-ENTMCNC: 31.6 G/DL (ref 31.5–36.5)
MCV RBC AUTO: 93 FL (ref 78–100)
MONOCYTES # BLD MANUAL: 0.1 10E3/UL (ref 0–1.3)
MONOCYTES NFR BLD MANUAL: 2 %
NEUTROPHILS # BLD MANUAL: 4.1 10E3/UL (ref 1.6–8.3)
NEUTROPHILS NFR BLD MANUAL: 82 %
PHOSPHATE SERPL-MCNC: 2.5 MG/DL (ref 2.5–4.5)
PLAT MORPH BLD: ABNORMAL
PLATELET # BLD AUTO: 166 10E3/UL (ref 150–450)
POTASSIUM BLD-SCNC: 3.2 MMOL/L (ref 3.4–5.3)
PROT SERPL-MCNC: 6.4 G/DL (ref 6.8–8.8)
RBC # BLD AUTO: 3.05 10E6/UL (ref 4.4–5.9)
RBC MORPH BLD: ABNORMAL
SODIUM SERPL-SCNC: 142 MMOL/L (ref 133–144)
WBC # BLD AUTO: 5 10E3/UL (ref 4–11)

## 2022-01-13 PROCEDURE — 84100 ASSAY OF PHOSPHORUS: CPT | Performed by: PHYSICIAN ASSISTANT

## 2022-01-13 PROCEDURE — 82040 ASSAY OF SERUM ALBUMIN: CPT | Performed by: PHYSICIAN ASSISTANT

## 2022-01-13 PROCEDURE — 85027 COMPLETE CBC AUTOMATED: CPT | Performed by: PHYSICIAN ASSISTANT

## 2022-01-13 PROCEDURE — 80053 COMPREHEN METABOLIC PANEL: CPT | Performed by: PHYSICIAN ASSISTANT

## 2022-01-13 PROCEDURE — 36415 COLL VENOUS BLD VENIPUNCTURE: CPT

## 2022-01-13 PROCEDURE — 250N000011 HC RX IP 250 OP 636: Performed by: PHYSICIAN ASSISTANT

## 2022-01-13 PROCEDURE — 83735 ASSAY OF MAGNESIUM: CPT | Performed by: PHYSICIAN ASSISTANT

## 2022-01-13 PROCEDURE — 258N000003 HC RX IP 258 OP 636: Performed by: PHYSICIAN ASSISTANT

## 2022-01-13 RX ORDER — POTASSIUM CHLORIDE 1500 MG/1
20 TABLET, EXTENDED RELEASE ORAL 2 TIMES DAILY
Qty: 60 TABLET | Refills: 1 | Status: SHIPPED | OUTPATIENT
Start: 2022-01-13 | End: 2022-01-28

## 2022-01-13 RX ADMIN — SODIUM CHLORIDE 1000 ML: 9 INJECTION, SOLUTION INTRAVENOUS at 15:13

## 2022-01-13 RX ADMIN — PEGFILGRASTIM 6 MG: 6 INJECTION SUBCUTANEOUS at 15:24

## 2022-01-13 NOTE — PROGRESS NOTES
Infusion Nursing Note:  Ifrah Huitron presents today for IVF and Neulasta injection.    Patient seen by provider today: No   present during visit today: Not Applicable.    Note: Potassium 3.2 today, inbasket sent to Maynor CHOW, to determine if oral replacement should be done in clinic today or if he is OK to take his home dose of potassium to replace. He stated he took 2 potassium pills today.    Did not receive message back from Maynor CHOW, advised patient to  continue taking his usual dose of potassium at home starting tomorrow and send a Appointeddt message to his care team to determine if potassium dose should be changed. He verbalized understanding.       Intravenous Access:  Peripheral IV placed.    Treatment Conditions:  Lab Results   Component Value Date     01/13/2022    POTASSIUM 3.2 (L) 01/13/2022    MAG 2.6 (H) 01/13/2022    CR 1.07 01/13/2022    COTY 9.0 01/13/2022    BILITOTAL 0.4 01/13/2022    ALBUMIN 2.8 (L) 01/13/2022    ALT 48 01/13/2022    AST 50 (H) 01/13/2022     Results reviewed, labs MET treatment parameters, ok to proceed with treatment.      Post Infusion Assessment:  Patient tolerated infusion without incident.  Blood return noted pre and post infusion.  Site patent and intact, free from redness, edema or discomfort.  No evidence of extravasations.  Access discontinued per protocol.       Discharge Plan:   Copy of AVS reviewed with patient and/or family.  Patient will return 1/17/22 for next appointment.  Patient discharged in stable condition accompanied by: self.  Departure Mode: Ambulatory.      Suzette Riley RN

## 2022-01-16 DIAGNOSIS — E83.39 HYPOPHOSPHATEMIA: ICD-10-CM

## 2022-01-16 NOTE — CONFIDENTIAL NOTE
Phosphorus tablet refill   Last prescribing provider: Maynor CHOW    Last clinic visit date: 1/12/22 Maynor CHOW    Any missed appointments or no-shows since last clinic visit?: No     Recommendations for requested medication (if none, N/A): 1/12/22 copied from chart note Maynor CHOW  Ifrah was called today for follow-up. He is doing much better today compared to yesterday. Diarrhea resolved. Energy levels are improved. Nausea is minimal-did take Zofran this morning and Zyprexa last night. He is eating and drinking OK, taking his potassium and phos supplements.      Next clinic visit date: 1/19/22 Maynor CHOW    Any other pertinent information (if none, N/A): N/A

## 2022-01-17 ENCOUNTER — APPOINTMENT (OUTPATIENT)
Dept: LAB | Facility: CLINIC | Age: 72
End: 2022-01-17
Payer: MEDICARE

## 2022-01-17 ENCOUNTER — INFUSION THERAPY VISIT (OUTPATIENT)
Dept: INFUSION THERAPY | Facility: CLINIC | Age: 72
End: 2022-01-17
Attending: PHYSICIAN ASSISTANT
Payer: MEDICARE

## 2022-01-17 VITALS — DIASTOLIC BLOOD PRESSURE: 87 MMHG | HEART RATE: 75 BPM | RESPIRATION RATE: 16 BRPM | SYSTOLIC BLOOD PRESSURE: 172 MMHG

## 2022-01-17 DIAGNOSIS — C49.9 SPINDLE CELL SARCOMA (H): Primary | ICD-10-CM

## 2022-01-17 DIAGNOSIS — T45.1X5A CHEMOTHERAPY-INDUCED NAUSEA: ICD-10-CM

## 2022-01-17 DIAGNOSIS — Z11.59 ENCOUNTER FOR SCREENING FOR OTHER VIRAL DISEASES: Primary | ICD-10-CM

## 2022-01-17 DIAGNOSIS — C49.9 SARCOMA (H): ICD-10-CM

## 2022-01-17 DIAGNOSIS — R11.0 CHEMOTHERAPY-INDUCED NAUSEA: ICD-10-CM

## 2022-01-17 LAB
ALBUMIN SERPL-MCNC: 3 G/DL (ref 3.4–5)
ALP SERPL-CCNC: 176 U/L (ref 40–150)
ALT SERPL W P-5'-P-CCNC: 30 U/L (ref 0–70)
ANION GAP SERPL CALCULATED.3IONS-SCNC: 4 MMOL/L (ref 3–14)
AST SERPL W P-5'-P-CCNC: 13 U/L (ref 0–45)
BASOPHILS # BLD MANUAL: 0 10E3/UL (ref 0–0.2)
BASOPHILS NFR BLD MANUAL: 0 %
BILIRUB SERPL-MCNC: 0.3 MG/DL (ref 0.2–1.3)
BUN SERPL-MCNC: 18 MG/DL (ref 7–30)
CALCIUM SERPL-MCNC: 9 MG/DL (ref 8.5–10.1)
CHLORIDE BLD-SCNC: 112 MMOL/L (ref 94–109)
CO2 SERPL-SCNC: 26 MMOL/L (ref 20–32)
CREAT SERPL-MCNC: 0.9 MG/DL (ref 0.66–1.25)
EOSINOPHIL # BLD MANUAL: 0 10E3/UL (ref 0–0.7)
EOSINOPHIL NFR BLD MANUAL: 0 %
ERYTHROCYTE [DISTWIDTH] IN BLOOD BY AUTOMATED COUNT: 17 % (ref 10–15)
GFR SERPL CREATININE-BSD FRML MDRD: 90 ML/MIN/1.73M2
GLUCOSE BLD-MCNC: 92 MG/DL (ref 70–99)
HCT VFR BLD AUTO: 24.5 % (ref 40–53)
HGB BLD-MCNC: 8 G/DL (ref 13.3–17.7)
LYMPHOCYTES # BLD MANUAL: 0.4 10E3/UL (ref 0.8–5.3)
LYMPHOCYTES NFR BLD MANUAL: 28 %
MAGNESIUM SERPL-MCNC: 1.9 MG/DL (ref 1.6–2.3)
MCH RBC QN AUTO: 29.9 PG (ref 26.5–33)
MCHC RBC AUTO-ENTMCNC: 32.7 G/DL (ref 31.5–36.5)
MCV RBC AUTO: 91 FL (ref 78–100)
MONOCYTES # BLD MANUAL: 0.2 10E3/UL (ref 0–1.3)
MONOCYTES NFR BLD MANUAL: 11 %
NEUTROPHILS # BLD MANUAL: 0.9 10E3/UL (ref 1.6–8.3)
NEUTROPHILS NFR BLD MANUAL: 61 %
PHOSPHATE SERPL-MCNC: 2.2 MG/DL (ref 2.5–4.5)
PLAT MORPH BLD: ABNORMAL
PLATELET # BLD AUTO: 89 10E3/UL (ref 150–450)
POTASSIUM BLD-SCNC: 3.6 MMOL/L (ref 3.4–5.3)
PROT SERPL-MCNC: 6.4 G/DL (ref 6.8–8.8)
RBC # BLD AUTO: 2.68 10E6/UL (ref 4.4–5.9)
RBC MORPH BLD: ABNORMAL
SODIUM SERPL-SCNC: 142 MMOL/L (ref 133–144)
WBC # BLD AUTO: 1.5 10E3/UL (ref 4–11)

## 2022-01-17 PROCEDURE — 85027 COMPLETE CBC AUTOMATED: CPT | Performed by: PHYSICIAN ASSISTANT

## 2022-01-17 PROCEDURE — 36415 COLL VENOUS BLD VENIPUNCTURE: CPT

## 2022-01-17 PROCEDURE — 258N000003 HC RX IP 258 OP 636: Performed by: PHYSICIAN ASSISTANT

## 2022-01-17 PROCEDURE — 80053 COMPREHEN METABOLIC PANEL: CPT | Performed by: PHYSICIAN ASSISTANT

## 2022-01-17 PROCEDURE — 82040 ASSAY OF SERUM ALBUMIN: CPT | Performed by: PHYSICIAN ASSISTANT

## 2022-01-17 PROCEDURE — 83735 ASSAY OF MAGNESIUM: CPT | Performed by: PHYSICIAN ASSISTANT

## 2022-01-17 PROCEDURE — 84100 ASSAY OF PHOSPHORUS: CPT | Performed by: PHYSICIAN ASSISTANT

## 2022-01-17 PROCEDURE — 96360 HYDRATION IV INFUSION INIT: CPT

## 2022-01-17 RX ADMIN — SODIUM CHLORIDE 1000 ML: 9 INJECTION, SOLUTION INTRAVENOUS at 14:58

## 2022-01-17 NOTE — PROGRESS NOTES
Infusion Nursing Note:  Ifrah Huitron presents today for IVF.    Patient seen by provider today: No   present during visit today: Not Applicable.    Note: Cr 0.9. pt states he feeling like these fluids help him a lot.      Intravenous Access:  Peripheral IV placed.    Treatment Conditions:  Lab Results   Component Value Date    HGB 8.0 (L) 01/17/2022    WBC 1.5 (L) 01/17/2022    ANEU 4.1 01/13/2022    ANEUTAUTO 3.6 01/11/2022    PLT 89 (L) 01/17/2022      Lab Results   Component Value Date     01/17/2022    POTASSIUM 3.6 01/17/2022    MAG 1.9 01/17/2022    CR 0.90 01/17/2022    COTY 9.0 01/17/2022    BILITOTAL 0.3 01/17/2022    ALBUMIN 3.0 (L) 01/17/2022    ALT 30 01/17/2022    AST 13 01/17/2022     Results reviewed, labs MET treatment parameters, ok to proceed with treatment.      Post Infusion Assessment:  Patient tolerated infusion without incident.   IV discharge per protocol.    Discharge Plan:   Patient declined prescription refills.  Discharge instructions reviewed with: Patient.  Patient discharged in stable condition accompanied by: self.  Departure Mode: Ambulatory.      Sarahi Astudillo RN

## 2022-01-18 ENCOUNTER — TELEPHONE (OUTPATIENT)
Dept: SURGERY | Facility: CLINIC | Age: 72
End: 2022-01-18
Payer: MEDICARE

## 2022-01-18 NOTE — PROGRESS NOTES
Oncology/Hematology Visit Note  Jan 19, 2022    Reason for Visit: Follow up of spindle cell sarcoma     History of Present Illness: Ifrah Huitron is a 71 year old male with PMH rosacea, pituitary macroadenoma s/p resection, GERD, kidney stones, DJD with metastatic spindle cell sarcoma. He presented to his PCP March 2021 with chest pain/left shoulder and back pain that led to imaging which revealed multiple lung mets. There were difficulties in getting diagnostic biopsy, eventually biopsy of mediastinal mass with spindle cell sarcoma. There was high PD-L1 expression (90%). Baseline imaging 6/21/21 with progression of lung mets and left-sided subdiaphragmatic mass, stable liver lesions. 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      He started Keytruda 6/21/21. CT 8/2/21 with positive response to treatment. CT 10/18/21 with mixed response- LUQ mass larger, anterior mediastinal and left anterior pleural mass smaller. Keytruda stopped.     Started on Doxil + Ifosfamide 10/26/21. Poorly tolerated with mucositis, nausea, poor oral intake. CT with stable to positive response.      Received C2 12/1/21 (delayed for tolerance issues) with 10% dose reduction.     Received C3 1/4/21 with same 10% dose reduction. He had issues with nausea inpatient along with recurrent thrush.    Interval History:  Ifrah was called today for follow-up. He had worsening thrush over the weekend that is now finally improved. He wasn't eating well but is doing better now. He still has 4 days of fluconazole left and then will start Nystatin. Nausea has been present but he is taking Zofran and Compazine PRN. No vomiting.     He denies fevers or chills. Has had some lightheadedness. Migraines come and go but managed well with Imitrex and are less severe compared to before. No neuro concerns. No chest pain, SOB, cough though he does get winded easily. No abdominal pain,  "bowel or bladder concerns, rashes. His edema is better. Rosacea is improved. No pain concerns.    Current Outpatient Medications   Medication Sig Dispense Refill     acetaminophen (TYLENOL) 500 MG tablet Take 500-1,000 mg by mouth every 6 hours as needed for mild pain       calcium carbonate (TUMS) 500 MG chewable tablet Take 1 tablet (500 mg) by mouth 3 times daily as needed for heartburn       celecoxib (CELEBREX) 200 MG capsule Take 1 capsule (200 mg) by mouth 2 times daily. Note this is a new a strength! Only one capsule at a time! 60 capsule 3     cholecalciferol (VITAMIN D-1000 MAX ST) 1000 units TABS Take 1,000 Units by mouth daily        doxepin (SINEQUAN) 10 MG/ML (HIGH CONC) solution Take 2 mLs (20 mg) by mouth every 4 hours as needed for other (mouth pain) . Mix with equal parts tap water. Swish and hold in mouth ~60 seconds then spit out. 118 mL 3     fluconazole (DIFLUCAN) 200 MG tablet Take 1 tablet (200 mg) by mouth daily 7 tablet 0     guaiFENesin-codeine (GUAIFENESIN AC) 100-10 MG/5ML syrup Take 10 mLs by mouth every 4 hours as needed for cough 118 mL 0     hydrocortisone (CORTEF) 10 MG tablet Take 20 mg in the morning and 10 mg in the afternoon 270 tablet 3     Insulin Syringe-Needle U-100 27.5G X 5/8\" 2 ML MISC 1 each daily as needed (with solucortef for adrenal crisis) 10 each 1     levothyroxine (SYNTHROID/LEVOTHROID) 75 MCG tablet Take 1 tablet (75 mcg) by mouth every morning 90 tablet 3     LORazepam (ATIVAN) 0.5 MG tablet Take 1 tablet (0.5 mg) by mouth every 4 hours as needed for nausea or vomiting . Caution: causes sedation. 30 tablet 0     Menthol-Methyl Salicylate (DALIA MALIK GREASELESS) cream Apply topically every 6 hours as needed       metroNIDAZOLE (METROGEL) 0.75 % external gel Apply topically 2 times daily 45 g 11     metroNIDAZOLE (METROGEL) 1 % external gel Apply topically daily .Try stronger dose due to increased symptoms after chemo. 30 g 0     nystatin (MYCOSTATIN) 855414 UNIT/ML " suspension Take 5 mLs (500,000 Units) by mouth 4 times daily Hold while taking Fluconazole, could resume after Fluconazole if you have ongoing coating on tongue. 473 mL 0     OLANZapine (ZYPREXA) 5 MG tablet Take 1 tablet (5 mg) by mouth At Bedtime Continue until your nausea resolves 30 tablet 1     omeprazole (PRILOSEC) 20 MG DR capsule Take 2 capsules (40 mg) by mouth daily 60 capsule 1     ondansetron (ZOFRAN) 4 MG tablet Take 1-2 tablets (4-8 mg) by mouth every 8 hours as needed for nausea 60 tablet 3     phosphorus tablet 250 mg (PHOSPHA 250 NEUTRAL) 250 MG per tablet Take 2 tablets (500 mg) by mouth 2 times daily 60 tablet 1     potassium chloride ER (K-TAB) 20 MEQ CR tablet Take 1 tablet (20 mEq) by mouth 2 times daily 60 tablet 1     prochlorperazine (COMPAZINE) 5 MG tablet Take 1 tablet (5 mg) by mouth every 6 hours as needed for nausea or vomiting . Caution: causes sedation. 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     Thyroid disease     removed pituitary gland     Past Surgical History:   Procedure Laterality Date     COLONOSCOPY N/A 12/17/2020    Procedure: COLONOSCOPY;  Surgeon: Ken Camacho MD;  Location: [facility]     ENT SURGERY       HERNIA REPAIR       LARYNGOSCOPY, EXCISE VOCAL CORD LESION MICROSCOPIC, COMBINED Left 07/01/2021    Procedure: MICROLARYNGOSCOPY, LEFT TRUE VOCAL CORD INJECTION WITH PROLARYN;  Surgeon: Kyree Bearden MD;  Location: [facility]     PHACOEMULSIFICATION WITH STANDARD INTRAOCULAR LENS IMPLANT Right 03/10/2021    Procedure: Cataract removal with implant.;  Surgeon: Jamir Mac MD;  Location: [facility]     PHACOEMULSIFICATION WITH STANDARD INTRAOCULAR LENS IMPLANT Left 04/05/2021    Procedure: Cataract removal with implant.;  Surgeon: Jamir Mac,  MD;  Location: [facility]     PICC DOUBLE LUMEN PLACEMENT Right 12/01/2021    5FR DL PICC, basilic vein. L-38cm, 1cm out.     PICC DOUBLE LUMEN PLACEMENT Right 01/04/2022    Right cephalic, 41 cm, 1 external length     PITUITARY EXCISION       tooth pulled 4/7  Right      Allergies   Allergen Reactions     Penicillins Hives and Swelling            Social History   Social History     Tobacco Use     Smoking status: Never Smoker     Smokeless tobacco: Never Used   Substance Use Topics     Alcohol use: Yes     Comment: rare     Drug use: Never      Past medical history and social history were reviewed.    Physical Examination:  There were no vitals taken for this visit.  Wt Readings from Last 10 Encounters:   01/11/22 65.1 kg (143 lb 8 oz)   12/09/21 66.7 kg (147 lb)   12/09/21 67 kg (147 lb 11.2 oz)   12/08/21 64.8 kg (142 lb 13.7 oz)   11/12/21 68 kg (150 lb)   11/11/21 69.9 kg (154 lb)   11/04/21 70 kg (154 lb 6.4 oz)   11/02/21 85.9 kg (189 lb 6.4 oz)   09/17/21 73 kg (161 lb)   09/01/21 72.6 kg (160 lb)     Video physical exam  General: Patient appears well in no acute distress.   Skin: No visualized rash or lesions on visualized skin  Eyes: EOMI, no erythema, sclera icterus or discharge noted  Resp: Appears to be breathing comfortably without accessory muscle usage, speaking in full sentences, no cough  MSK: Appears to have normal range of motion based on visualized movements  Neurologic: No apparent tremors, facial movements symmetric  Psych: affect normal, alert and oriented    The rest of a comprehensive physical examination is deferred due to PHE (public health emergency) video restrictions    Laboratory Data:  Results for MARISOL XIE (MRN [id number]) as of 1/19/2022 13:56   1/17/2022 14:11   Sodium 142   Potassium 3.6   Chloride 112 (H)   Carbon Dioxide 26   Urea Nitrogen 18   Creatinine 0.90   GFR Estimate 90   Calcium 9.0   Anion Gap 4   Magnesium 1.9   Phosphorus 2.2 (L)   Albumin 3.0 (L)   Protein Total 6.4  (L)   Bilirubin Total 0.3   Alkaline Phosphatase 176 (H)   ALT 30   AST 13   Glucose 92   WBC 1.5 (L)   Hemoglobin 8.0 (L)   Hematocrit 24.5 (L)   Platelet Count 89 (L)   RBC Count 2.68 (L)   MCV 91   MCH 29.9   MCHC 32.7   RDW 17.0 (H)   % Neutrophils 61   % Lymphocytes 28   % Monocytes 11   % Eosinophils 0   % Basophils 0   Absolute Basophils 0.0   Absolute Neutrophil 0.9 (L)   Absolute Lymphocytes 0.4 (L)   Absolute Monocytes 0.2   Absolute Eosinophils 0.0       Assessment and Plan:  1. 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 High PD-L1. Was on Keytruda but had progression, now on Doxil + Ifos started 10/26/21.     Did not tolerate well-had significant mucositis, poor PO intake, nausea, and electrolyte derrangements. He has had less cancer pain and CT with stable disease. Cycle 2 given 12/1/21 with 10% dose reduction and did better. Cycle 3 given 1/4/22 at same dose as C2- did have nausea and hypokalemia/hypophos.     Doing well overall other than thrush, improving now. See supportive cares below. He is scheduled for 2x weekly labs and PRN IVF. Monitor anemia- did briefly discuss blood transfusion.      MARLEEN next week. Admission for cycle 4 2/1/22.      Port scheduled 1/28/22.      Repeat imaging after cycle 4.      2. GI  Dyspepsia: Continue Omeprazole 40mg daily. Continue Tums PRN     CINV: Continue Zofran daily and Zyprexa at bedtime.Continue Compazine and Lorazepam PRN. Continue Emend inpatient on day 1 and day 6.      LFT elevation: 2/2 statin, improving     Gas: Continue Gas-x PRN      Diarrhea: Inpatient issue, C. Diff was negative, now resolved.      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 Cal Saba endocrine.     Hypothyroidism continue Synthroid 75mcg daily.      4. Derm  Rosacea: Long standing issue, continue Metrogel PRN     Hand/foot: 2/2 Doxil, iced hands during C3. Continue emollients at home. Monitor.      5. MSK  Left  shoulder/back/chest wall pain 2/2 tumor, following with palliative. Continue topical Bengay and PO Celebrex BID. Pain improved.      Monitor tingling/neuropathy. Minimal.      6. ENT  Hoarseness: Thought 2/2 mediastinal mass vs irritation from prior biopsy. Following with ENT, s/p vocal cord infection 7/1/21. Will see our voice clinic in Dec due to ongoing issues. This is improved.     Mucositis/Thrush: Significant, 2/2 Doxil. Better with dose reduction. Continue salt/soda rinses. He will finish the fluconazole course and then start Nystatin.      7. Pulm/Cards  Dyspnea with talking, prior work-up with CT PE negative for PE, infection, pneumonitis; troponin negative; COVID negative. Sating well on RA. Agree with speech pathology thoughts on laryngeal dysfunction. Symptoms improved.       Continue Guaifenesin-Coedine PRN for cough, much improved.      Saw cardiology, echo WNL. Has mild CAD, recommended to start on statin however now HELD for LFT elevation, improving.      Had elevated BP inpatient. Monitor. Hesitant to start antihypertensives given prior issues with dehydration and lightheadedness.      Continue compression stockings for edema 2/2 fluids from hospital.      8. FEN  Hypokalemia: 2/2 Ifos and poor PO intake. Currently on 20meq BID.      Hypophosphatemia: 2/2 ifos and poor PO intake. Currently on 500mg BID.     Poor PO intake: 2/2 mucositis and nausea as above. Was poor with thrush, now improved.      Dehydration: Twice weekly fluids at [place] edema improved.      9. Psych  Anxiety/depression: Hx of issues, recurrent issue when he was feeling poorly from treatment. Now that he is physically doing better his mood is much improved.     Refilled Imitrex for migraines.     40 minutes spent on the date of the encounter doing chart review, review of test results, interpretation of tests, patient visit and documentation     Maynor Rios PA-C  Department of Hematology and Oncology  [facility]  [facility]

## 2022-01-18 NOTE — TELEPHONE ENCOUNTER
Ifrah is going to call Coleen @ [facility] as Dr. Echevarria is out so he would not be able to do surgery (Port Placed) till 2-9-22.  He would be in the middle of his chemo and would like to have this done sooner.  Inez Bailey CMA

## 2022-01-18 NOTE — TELEPHONE ENCOUNTER
Reason for Call:  Other call back    Detailed comments: Coleen from [facility] (ph: [phone] ,  or main clinic ph: [phone]) calling on behalf of pt to schedule port placement for pt. Pt had consult on 01/03/2022 with Dr. Echevarria.   Please call patient to schedule. Please call Coleen with any questions.      Phone Number Patient can be reached at: Home number on file [phone] (home)    Best Time: any    Can we leave a detailed message on this number? Not Applicable    Call taken on 1/18/2022 at 9:38 AM by Arcenio Weiss

## 2022-01-19 ENCOUNTER — VIRTUAL VISIT (OUTPATIENT)
Dept: ONCOLOGY | Facility: CLINIC | Age: 72
End: 2022-01-19
Attending: PHYSICIAN ASSISTANT
Payer: MEDICARE

## 2022-01-19 DIAGNOSIS — G43.109 MIGRAINE WITH AURA AND WITHOUT STATUS MIGRAINOSUS, NOT INTRACTABLE: Primary | ICD-10-CM

## 2022-01-19 PROCEDURE — G0463 HOSPITAL OUTPT CLINIC VISIT: HCPCS | Mod: PN,RTG | Performed by: PHYSICIAN ASSISTANT

## 2022-01-19 PROCEDURE — 99215 OFFICE O/P EST HI 40 MIN: CPT | Mod: 95 | Performed by: PHYSICIAN ASSISTANT

## 2022-01-19 RX ORDER — SUMATRIPTAN 50 MG/1
50 TABLET, FILM COATED ORAL
Qty: 10 TABLET | Refills: 3 | Status: SHIPPED | OUTPATIENT
Start: 2022-01-19

## 2022-01-19 NOTE — LETTER
1/19/2022         RE: Ifrah Huitron  [address] [place] [address]        Dear Colleague,    Thank you for referring your patient, Ifrah Huitron, to the [facility]. Please see a copy of my visit note below.    Oncology/Hematology Visit Note  Jan 19, 2022    Reason for Visit: Follow up of spindle cell sarcoma     History of Present Illness: Ifrah Huitron is a 71 year old male with PMH rosacea, pituitary macroadenoma s/p resection, GERD, kidney stones, DJD with metastatic spindle cell sarcoma. He presented to his PCP March 2021 with chest pain/left shoulder and back pain that led to imaging which revealed multiple lung mets. There were difficulties in getting diagnostic biopsy, eventually biopsy of mediastinal mass with spindle cell sarcoma. There was high PD-L1 expression (90%). Baseline imaging 6/21/21 with progression of lung mets and left-sided subdiaphragmatic mass, stable liver lesions. 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      He started Keytruda 6/21/21. CT 8/2/21 with positive response to treatment. CT 10/18/21 with mixed response- LUQ mass larger, anterior mediastinal and left anterior pleural mass smaller. Keytruda stopped.     Started on Doxil + Ifosfamide 10/26/21. Poorly tolerated with mucositis, nausea, poor oral intake. CT with stable to positive response.      Received C2 12/1/21 (delayed for tolerance issues) with 10% dose reduction.     Received C3 1/4/21 with same 10% dose reduction. He had issues with nausea inpatient along with recurrent thrush.    Interval History:  Ifrah was called today for follow-up. He had worsening thrush over the weekend that is now finally improved. He wasn't eating well but is doing better now. He still has 4 days of fluconazole left and then will start Nystatin. Nausea has been present but he is taking Zofran and Compazine PRN. No  "vomiting.     He denies fevers or chills. Has had some lightheadedness. Migraines come and go but managed well with Imitrex and are less severe compared to before. No neuro concerns. No chest pain, SOB, cough though he does get winded easily. No abdominal pain, bowel or bladder concerns, rashes. His edema is better. Rosacea is improved. No pain concerns.    Current Outpatient Medications   Medication Sig Dispense Refill     acetaminophen (TYLENOL) 500 MG tablet Take 500-1,000 mg by mouth every 6 hours as needed for mild pain       calcium carbonate (TUMS) 500 MG chewable tablet Take 1 tablet (500 mg) by mouth 3 times daily as needed for heartburn       celecoxib (CELEBREX) 200 MG capsule Take 1 capsule (200 mg) by mouth 2 times daily. Note this is a new a strength! Only one capsule at a time! 60 capsule 3     cholecalciferol (VITAMIN D-1000 MAX ST) 1000 units TABS Take 1,000 Units by mouth daily        doxepin (SINEQUAN) 10 MG/ML (HIGH CONC) solution Take 2 mLs (20 mg) by mouth every 4 hours as needed for other (mouth pain) . Mix with equal parts tap water. Swish and hold in mouth ~60 seconds then spit out. 118 mL 3     fluconazole (DIFLUCAN) 200 MG tablet Take 1 tablet (200 mg) by mouth daily 7 tablet 0     guaiFENesin-codeine (GUAIFENESIN AC) 100-10 MG/5ML syrup Take 10 mLs by mouth every 4 hours as needed for cough 118 mL 0     hydrocortisone (CORTEF) 10 MG tablet Take 20 mg in the morning and 10 mg in the afternoon 270 tablet 3     Insulin Syringe-Needle U-100 27.5G X 5/8\" 2 ML MISC 1 each daily as needed (with solucortef for adrenal crisis) 10 each 1     levothyroxine (SYNTHROID/LEVOTHROID) 75 MCG tablet Take 1 tablet (75 mcg) by mouth every morning 90 tablet 3     LORazepam (ATIVAN) 0.5 MG tablet Take 1 tablet (0.5 mg) by mouth every 4 hours as needed for nausea or vomiting . Caution: causes sedation. 30 tablet 0     Menthol-Methyl Salicylate (DALIA MALIK GREASELESS) cream Apply topically every 6 hours as needed   "     metroNIDAZOLE (METROGEL) 0.75 % external gel Apply topically 2 times daily 45 g 11     metroNIDAZOLE (METROGEL) 1 % external gel Apply topically daily .Try stronger dose due to increased symptoms after chemo. 30 g 0     nystatin (MYCOSTATIN) 020587 UNIT/ML suspension Take 5 mLs (500,000 Units) by mouth 4 times daily Hold while taking Fluconazole, could resume after Fluconazole if you have ongoing coating on tongue. 473 mL 0     OLANZapine (ZYPREXA) 5 MG tablet Take 1 tablet (5 mg) by mouth At Bedtime Continue until your nausea resolves 30 tablet 1     omeprazole (PRILOSEC) 20 MG DR capsule Take 2 capsules (40 mg) by mouth daily 60 capsule 1     ondansetron (ZOFRAN) 4 MG tablet Take 1-2 tablets (4-8 mg) by mouth every 8 hours as needed for nausea 60 tablet 3     phosphorus tablet 250 mg (PHOSPHA 250 NEUTRAL) 250 MG per tablet Take 2 tablets (500 mg) by mouth 2 times daily 60 tablet 1     potassium chloride ER (K-TAB) 20 MEQ CR tablet Take 1 tablet (20 mEq) by mouth 2 times daily 60 tablet 1     prochlorperazine (COMPAZINE) 5 MG tablet Take 1 tablet (5 mg) by mouth every 6 hours as needed for nausea or vomiting . Caution: causes sedation. 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     Thyroid disease     removed pituitary gland     Past Surgical History:   Procedure Laterality Date     COLONOSCOPY N/A 12/17/2020    Procedure: COLONOSCOPY;  Surgeon: Ken Camacho MD;  Location: [facility]     ENT SURGERY       HERNIA REPAIR       LARYNGOSCOPY, EXCISE VOCAL CORD LESION MICROSCOPIC, COMBINED Left 07/01/2021    Procedure: MICROLARYNGOSCOPY, LEFT TRUE VOCAL CORD INJECTION WITH PROLARYN;  Surgeon: Kyree Bearden MD;  Location: [facility]     PHACOEMULSIFICATION WITH STANDARD INTRAOCULAR LENS IMPLANT Right 03/10/2021     Procedure: Cataract removal with implant.;  Surgeon: Jamir Mac MD;  Location: [facility]     PHACOEMULSIFICATION WITH STANDARD INTRAOCULAR LENS IMPLANT Left 04/05/2021    Procedure: Cataract removal with implant.;  Surgeon: Jamir Mac MD;  Location: [facility]     PICC DOUBLE LUMEN PLACEMENT Right 12/01/2021    5FR DL PICC, basilic vein. L-38cm, 1cm out.     PICC DOUBLE LUMEN PLACEMENT Right 01/04/2022    Right cephalic, 41 cm, 1 external length     PITUITARY EXCISION       tooth pulled 4/7  Right      Allergies   Allergen Reactions     Penicillins Hives and Swelling            Social History   Social History     Tobacco Use     Smoking status: Never Smoker     Smokeless tobacco: Never Used   Substance Use Topics     Alcohol use: Yes     Comment: rare     Drug use: Never      Past medical history and social history were reviewed.    Physical Examination:  There were no vitals taken for this visit.  Wt Readings from Last 10 Encounters:   01/11/22 65.1 kg (143 lb 8 oz)   12/09/21 66.7 kg (147 lb)   12/09/21 67 kg (147 lb 11.2 oz)   12/08/21 64.8 kg (142 lb 13.7 oz)   11/12/21 68 kg (150 lb)   11/11/21 69.9 kg (154 lb)   11/04/21 70 kg (154 lb 6.4 oz)   11/02/21 85.9 kg (189 lb 6.4 oz)   09/17/21 73 kg (161 lb)   09/01/21 72.6 kg (160 lb)     Video physical exam  General: Patient appears well in no acute distress.   Skin: No visualized rash or lesions on visualized skin  Eyes: EOMI, no erythema, sclera icterus or discharge noted  Resp: Appears to be breathing comfortably without accessory muscle usage, speaking in full sentences, no cough  MSK: Appears to have normal range of motion based on visualized movements  Neurologic: No apparent tremors, facial movements symmetric  Psych: affect normal, alert and oriented    The rest of a comprehensive physical examination is deferred due to PHE (public health emergency) video restrictions    Laboratory Data:  Results for LAMIRNAMARISOL LIM (MRN [id number]) as of  1/19/2022 13:56   1/17/2022 14:11   Sodium 142   Potassium 3.6   Chloride 112 (H)   Carbon Dioxide 26   Urea Nitrogen 18   Creatinine 0.90   GFR Estimate 90   Calcium 9.0   Anion Gap 4   Magnesium 1.9   Phosphorus 2.2 (L)   Albumin 3.0 (L)   Protein Total 6.4 (L)   Bilirubin Total 0.3   Alkaline Phosphatase 176 (H)   ALT 30   AST 13   Glucose 92   WBC 1.5 (L)   Hemoglobin 8.0 (L)   Hematocrit 24.5 (L)   Platelet Count 89 (L)   RBC Count 2.68 (L)   MCV 91   MCH 29.9   MCHC 32.7   RDW 17.0 (H)   % Neutrophils 61   % Lymphocytes 28   % Monocytes 11   % Eosinophils 0   % Basophils 0   Absolute Basophils 0.0   Absolute Neutrophil 0.9 (L)   Absolute Lymphocytes 0.4 (L)   Absolute Monocytes 0.2   Absolute Eosinophils 0.0       Assessment and Plan:  1. 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 High PD-L1. Was on Keytruda but had progression, now on Doxil + Ifos started 10/26/21.     Did not tolerate well-had significant mucositis, poor PO intake, nausea, and electrolyte derrangements. He has had less cancer pain and CT with stable disease. Cycle 2 given 12/1/21 with 10% dose reduction and did better. Cycle 3 given 1/4/22 at same dose as C2- did have nausea and hypokalemia/hypophos.     Doing well overall other than thrush, improving now. See supportive cares below. He is scheduled for 2x weekly labs and PRN IVF. Monitor anemia- did briefly discuss blood transfusion.      MARLEEN next week. Admission for cycle 4 2/1/22.      Port scheduled 1/28/22.      Repeat imaging after cycle 4.      2. GI  Dyspepsia: Continue Omeprazole 40mg daily. Continue Tums PRN     CINV: Continue Zofran daily and Zyprexa at bedtime.Continue Compazine and Lorazepam PRN. Continue Emend inpatient on day 1 and day 6.      LFT elevation: 2/2 statin, improving     Gas: Continue Gas-x PRN      Diarrhea: Inpatient issue, C. Diff was negative, now resolved.      3. 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 Cal Saba endocrine.     Hypothyroidism continue Synthroid 75mcg daily.      4. Derm  Rosacea: Long standing issue, continue Metrogel PRN     Hand/foot: 2/2 Doxil, iced hands during C3. Continue emollients at home. Monitor.      5. MSK  Left shoulder/back/chest wall pain 2/2 tumor, following with palliative. Continue topical Bengay and PO Celebrex BID. Pain improved.      Monitor tingling/neuropathy. Minimal.      6. ENT  Hoarseness: Thought 2/2 mediastinal mass vs irritation from prior biopsy. Following with ENT, s/p vocal cord infection 7/1/21. Will see our voice clinic in Dec due to ongoing issues. This is improved.     Mucositis/Thrush: Significant, 2/2 Doxil. Better with dose reduction. Continue salt/soda rinses. He will finish the fluconazole course and then start Nystatin.      7. Pulm/Cards  Dyspnea with talking, prior work-up with CT PE negative for PE, infection, pneumonitis; troponin negative; COVID negative. Sating well on RA. Agree with speech pathology thoughts on laryngeal dysfunction. Symptoms improved.       Continue Guaifenesin-Coedine PRN for cough, much improved.      Saw cardiology, echo WNL. Has mild CAD, recommended to start on statin however now HELD for LFT elevation, improving.      Had elevated BP inpatient. Monitor. Hesitant to start antihypertensives given prior issues with dehydration and lightheadedness.      Continue compression stockings for edema 2/2 fluids from hospital.      8. FEN  Hypokalemia: 2/2 Ifos and poor PO intake. Currently on 20meq BID.      Hypophosphatemia: 2/2 ifos and poor PO intake. Currently on 500mg BID.     Poor PO intake: 2/2 mucositis and nausea as above. Was poor with thrush, now improved.      Dehydration: Twice weekly fluids at [place] edema improved.      9. Psych  Anxiety/depression: Hx of issues, recurrent issue when he was feeling poorly from treatment. Now that he is physically doing better his mood is much improved.     Refilled  Imitrex for migraines.     40 minutes spent on the date of the encounter doing chart review, review of test results, interpretation of tests, patient visit and documentation     Maynor Rios PA-C  Department of Hematology and Oncology  [facility]       Ifrah is a 71 year old who is being evaluated via a billable video visit.      How would you like to obtain your AVS? MyChart  If the video visit is dropped, the invitation should be resent by: Send to e-mail at: [email or web address]  Will anyone else be joining your video visit? Yes: NA. How would they like to receive their invitation? Other e-mail: NA      Video Start Time: 1:25pm    Video-Visit Details    Type of service:  Video Visit    Video End Time: 1:45pm    Originating Location (pt. Location): Home    Distant Location (provider location):  [facility]     Platform used for Video Visit: [facility]      Again, thank you for allowing me to participate in the care of your patient.        Sincerely,        GERALDO Mullins

## 2022-01-19 NOTE — PROGRESS NOTES
Ifrah is a 71 year old who is being evaluated via a billable video visit.      How would you like to obtain your AVS? MyChart  If the video visit is dropped, the invitation should be resent by: Send to e-mail at: [email or web address]  Will anyone else be joining your video visit? Yes: NA. How would they like to receive their invitation? Other e-mail: NA      Video Start Time: 1:25pm    Video-Visit Details    Type of service:  Video Visit    Video End Time: 1:45pm    Originating Location (pt. Location): Home    Distant Location (provider location):  [facility]     Platform used for Video Visit: [employer]

## 2022-01-19 NOTE — LETTER
1/19/2022         RE: Ifrah Huitron  [address] [place] [address]        Dear Colleague,    Thank you for referring your patient, Ifrah Huitron, to the [facility]. Please see a copy of my visit note below.    Oncology/Hematology Visit Note  Jan 19, 2022    Reason for Visit: Follow up of spindle cell sarcoma     History of Present Illness: Ifrah Huitron is a 71 year old male with PMH rosacea, pituitary macroadenoma s/p resection, GERD, kidney stones, DJD with metastatic spindle cell sarcoma. He presented to his PCP March 2021 with chest pain/left shoulder and back pain that led to imaging which revealed multiple lung mets. There were difficulties in getting diagnostic biopsy, eventually biopsy of mediastinal mass with spindle cell sarcoma. There was high PD-L1 expression (90%). Baseline imaging 6/21/21 with progression of lung mets and left-sided subdiaphragmatic mass, stable liver lesions. 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      He started Keytruda 6/21/21. CT 8/2/21 with positive response to treatment. CT 10/18/21 with mixed response- LUQ mass larger, anterior mediastinal and left anterior pleural mass smaller. Keytruda stopped.     Started on Doxil + Ifosfamide 10/26/21. Poorly tolerated with mucositis, nausea, poor oral intake. CT with stable to positive response.      Received C2 12/1/21 (delayed for tolerance issues) with 10% dose reduction.     Received C3 1/4/21 with same 10% dose reduction. He had issues with nausea inpatient along with recurrent thrush.    Interval History:  Ifrah was called today for follow-up. He had worsening thrush over the weekend that is now finally improved. He wasn't eating well but is doing better now. He still has 4 days of fluconazole left and then will start Nystatin. Nausea has been present but he is taking Zofran and Compazine PRN. No  "vomiting.     He denies fevers or chills. Has had some lightheadedness. Migraines come and go but managed well with Imitrex and are less severe compared to before. No neuro concerns. No chest pain, SOB, cough though he does get winded easily. No abdominal pain, bowel or bladder concerns, rashes. His edema is better. Rosacea is improved. No pain concerns.    Current Outpatient Medications   Medication Sig Dispense Refill     acetaminophen (TYLENOL) 500 MG tablet Take 500-1,000 mg by mouth every 6 hours as needed for mild pain       calcium carbonate (TUMS) 500 MG chewable tablet Take 1 tablet (500 mg) by mouth 3 times daily as needed for heartburn       celecoxib (CELEBREX) 200 MG capsule Take 1 capsule (200 mg) by mouth 2 times daily. Note this is a new a strength! Only one capsule at a time! 60 capsule 3     cholecalciferol (VITAMIN D-1000 MAX ST) 1000 units TABS Take 1,000 Units by mouth daily        doxepin (SINEQUAN) 10 MG/ML (HIGH CONC) solution Take 2 mLs (20 mg) by mouth every 4 hours as needed for other (mouth pain) . Mix with equal parts tap water. Swish and hold in mouth ~60 seconds then spit out. 118 mL 3     fluconazole (DIFLUCAN) 200 MG tablet Take 1 tablet (200 mg) by mouth daily 7 tablet 0     guaiFENesin-codeine (GUAIFENESIN AC) 100-10 MG/5ML syrup Take 10 mLs by mouth every 4 hours as needed for cough 118 mL 0     hydrocortisone (CORTEF) 10 MG tablet Take 20 mg in the morning and 10 mg in the afternoon 270 tablet 3     Insulin Syringe-Needle U-100 27.5G X 5/8\" 2 ML MISC 1 each daily as needed (with solucortef for adrenal crisis) 10 each 1     levothyroxine (SYNTHROID/LEVOTHROID) 75 MCG tablet Take 1 tablet (75 mcg) by mouth every morning 90 tablet 3     LORazepam (ATIVAN) 0.5 MG tablet Take 1 tablet (0.5 mg) by mouth every 4 hours as needed for nausea or vomiting . Caution: causes sedation. 30 tablet 0     Menthol-Methyl Salicylate (DALIA MALIK GREASELESS) cream Apply topically every 6 hours as needed   "     metroNIDAZOLE (METROGEL) 0.75 % external gel Apply topically 2 times daily 45 g 11     metroNIDAZOLE (METROGEL) 1 % external gel Apply topically daily .Try stronger dose due to increased symptoms after chemo. 30 g 0     nystatin (MYCOSTATIN) 733653 UNIT/ML suspension Take 5 mLs (500,000 Units) by mouth 4 times daily Hold while taking Fluconazole, could resume after Fluconazole if you have ongoing coating on tongue. 473 mL 0     OLANZapine (ZYPREXA) 5 MG tablet Take 1 tablet (5 mg) by mouth At Bedtime Continue until your nausea resolves 30 tablet 1     omeprazole (PRILOSEC) 20 MG DR capsule Take 2 capsules (40 mg) by mouth daily 60 capsule 1     ondansetron (ZOFRAN) 4 MG tablet Take 1-2 tablets (4-8 mg) by mouth every 8 hours as needed for nausea 60 tablet 3     phosphorus tablet 250 mg (PHOSPHA 250 NEUTRAL) 250 MG per tablet Take 2 tablets (500 mg) by mouth 2 times daily 60 tablet 1     potassium chloride ER (K-TAB) 20 MEQ CR tablet Take 1 tablet (20 mEq) by mouth 2 times daily 60 tablet 1     prochlorperazine (COMPAZINE) 5 MG tablet Take 1 tablet (5 mg) by mouth every 6 hours as needed for nausea or vomiting . Caution: causes sedation. 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     Thyroid disease     removed pituitary gland     Past Surgical History:   Procedure Laterality Date     COLONOSCOPY N/A 12/17/2020    Procedure: COLONOSCOPY;  Surgeon: Ken Camacho MD;  Location: [facility]     ENT SURGERY       HERNIA REPAIR       LARYNGOSCOPY, EXCISE VOCAL CORD LESION MICROSCOPIC, COMBINED Left 07/01/2021    Procedure: MICROLARYNGOSCOPY, LEFT TRUE VOCAL CORD INJECTION WITH PROLARYN;  Surgeon: Kyree Bearden MD;  Location: [facility]     PHACOEMULSIFICATION WITH STANDARD INTRAOCULAR LENS IMPLANT Right 03/10/2021     Procedure: Cataract removal with implant.;  Surgeon: Jamir Mac MD;  Location: [facility]     PHACOEMULSIFICATION WITH STANDARD INTRAOCULAR LENS IMPLANT Left 04/05/2021    Procedure: Cataract removal with implant.;  Surgeon: Jamir Mac MD;  Location: [facility]     PICC DOUBLE LUMEN PLACEMENT Right 12/01/2021    5FR DL PICC, basilic vein. L-38cm, 1cm out.     PICC DOUBLE LUMEN PLACEMENT Right 01/04/2022    Right cephalic, 41 cm, 1 external length     PITUITARY EXCISION       tooth pulled 4/7  Right      Allergies   Allergen Reactions     Penicillins Hives and Swelling            Social History   Social History     Tobacco Use     Smoking status: Never Smoker     Smokeless tobacco: Never Used   Substance Use Topics     Alcohol use: Yes     Comment: rare     Drug use: Never      Past medical history and social history were reviewed.    Physical Examination:  There were no vitals taken for this visit.  Wt Readings from Last 10 Encounters:   01/11/22 65.1 kg (143 lb 8 oz)   12/09/21 66.7 kg (147 lb)   12/09/21 67 kg (147 lb 11.2 oz)   12/08/21 64.8 kg (142 lb 13.7 oz)   11/12/21 68 kg (150 lb)   11/11/21 69.9 kg (154 lb)   11/04/21 70 kg (154 lb 6.4 oz)   11/02/21 85.9 kg (189 lb 6.4 oz)   09/17/21 73 kg (161 lb)   09/01/21 72.6 kg (160 lb)     Video physical exam  General: Patient appears well in no acute distress.   Skin: No visualized rash or lesions on visualized skin  Eyes: EOMI, no erythema, sclera icterus or discharge noted  Resp: Appears to be breathing comfortably without accessory muscle usage, speaking in full sentences, no cough  MSK: Appears to have normal range of motion based on visualized movements  Neurologic: No apparent tremors, facial movements symmetric  Psych: affect normal, alert and oriented    The rest of a comprehensive physical examination is deferred due to PHE (public health emergency) video restrictions    Laboratory Data:  Results for LAMIRNAMARISOL LIM (MRN [id number]) as of  1/19/2022 13:56   1/17/2022 14:11   Sodium 142   Potassium 3.6   Chloride 112 (H)   Carbon Dioxide 26   Urea Nitrogen 18   Creatinine 0.90   GFR Estimate 90   Calcium 9.0   Anion Gap 4   Magnesium 1.9   Phosphorus 2.2 (L)   Albumin 3.0 (L)   Protein Total 6.4 (L)   Bilirubin Total 0.3   Alkaline Phosphatase 176 (H)   ALT 30   AST 13   Glucose 92   WBC 1.5 (L)   Hemoglobin 8.0 (L)   Hematocrit 24.5 (L)   Platelet Count 89 (L)   RBC Count 2.68 (L)   MCV 91   MCH 29.9   MCHC 32.7   RDW 17.0 (H)   % Neutrophils 61   % Lymphocytes 28   % Monocytes 11   % Eosinophils 0   % Basophils 0   Absolute Basophils 0.0   Absolute Neutrophil 0.9 (L)   Absolute Lymphocytes 0.4 (L)   Absolute Monocytes 0.2   Absolute Eosinophils 0.0       Assessment and Plan:  1. 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 High PD-L1. Was on Keytruda but had progression, now on Doxil + Ifos started 10/26/21.     Did not tolerate well-had significant mucositis, poor PO intake, nausea, and electrolyte derrangements. He has had less cancer pain and CT with stable disease. Cycle 2 given 12/1/21 with 10% dose reduction and did better. Cycle 3 given 1/4/22 at same dose as C2- did have nausea and hypokalemia/hypophos.     Doing well overall other than thrush, improving now. See supportive cares below. He is scheduled for 2x weekly labs and PRN IVF. Monitor anemia- did briefly discuss blood transfusion.      MARLEEN next week. Admission for cycle 4 2/1/22.      Port scheduled 1/28/22.      Repeat imaging after cycle 4.      2. GI  Dyspepsia: Continue Omeprazole 40mg daily. Continue Tums PRN     CINV: Continue Zofran daily and Zyprexa at bedtime.Continue Compazine and Lorazepam PRN. Continue Emend inpatient on day 1 and day 6.      LFT elevation: 2/2 statin, improving     Gas: Continue Gas-x PRN      Diarrhea: Inpatient issue, C. Diff was negative, now resolved.      3. 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 Cal Saba endocrine.     Hypothyroidism continue Synthroid 75mcg daily.      4. Derm  Rosacea: Long standing issue, continue Metrogel PRN     Hand/foot: 2/2 Doxil, iced hands during C3. Continue emollients at home. Monitor.      5. MSK  Left shoulder/back/chest wall pain 2/2 tumor, following with palliative. Continue topical Bengay and PO Celebrex BID. Pain improved.      Monitor tingling/neuropathy. Minimal.      6. ENT  Hoarseness: Thought 2/2 mediastinal mass vs irritation from prior biopsy. Following with ENT, s/p vocal cord infection 7/1/21. Will see our voice clinic in Dec due to ongoing issues. This is improved.     Mucositis/Thrush: Significant, 2/2 Doxil. Better with dose reduction. Continue salt/soda rinses. He will finish the fluconazole course and then start Nystatin.      7. Pulm/Cards  Dyspnea with talking, prior work-up with CT PE negative for PE, infection, pneumonitis; troponin negative; COVID negative. Sating well on RA. Agree with speech pathology thoughts on laryngeal dysfunction. Symptoms improved.       Continue Guaifenesin-Coedine PRN for cough, much improved.      Saw cardiology, echo WNL. Has mild CAD, recommended to start on statin however now HELD for LFT elevation, improving.      Had elevated BP inpatient. Monitor. Hesitant to start antihypertensives given prior issues with dehydration and lightheadedness.      Continue compression stockings for edema 2/2 fluids from hospital.      8. FEN  Hypokalemia: 2/2 Ifos and poor PO intake. Currently on 20meq BID.      Hypophosphatemia: 2/2 ifos and poor PO intake. Currently on 500mg BID.     Poor PO intake: 2/2 mucositis and nausea as above. Was poor with thrush, now improved.      Dehydration: Twice weekly fluids at [place] edema improved.      9. Psych  Anxiety/depression: Hx of issues, recurrent issue when he was feeling poorly from treatment. Now that he is physically doing better his mood is much improved.     Refilled  Imitrex for migraines.     40 minutes spent on the date of the encounter doing chart review, review of test results, interpretation of tests, patient visit and documentation     Maynor Rios PA-C  Department of Hematology and Oncology  [facility]       Ifrah is a 71 year old who is being evaluated via a billable video visit.      How would you like to obtain your AVS? MyChart  If the video visit is dropped, the invitation should be resent by: Send to e-mail at: [email or web address]  Will anyone else be joining your video visit? Yes: NA. How would they like to receive their invitation? Other e-mail: NA      Video Start Time: 1:25pm    Video-Visit Details    Type of service:  Video Visit    Video End Time: 1:45pm    Originating Location (pt. Location): Home    Distant Location (provider location):  [facility]     Platform used for Video Visit: [facility]      Again, thank you for allowing me to participate in the care of your patient.        Sincerely,        GERALDO Mullins

## 2022-01-20 ENCOUNTER — APPOINTMENT (OUTPATIENT)
Dept: LAB | Facility: CLINIC | Age: 72
End: 2022-01-20
Payer: MEDICARE

## 2022-01-20 ENCOUNTER — INFUSION THERAPY VISIT (OUTPATIENT)
Dept: INFUSION THERAPY | Facility: CLINIC | Age: 72
End: 2022-01-20
Attending: PHYSICIAN ASSISTANT
Payer: MEDICARE

## 2022-01-20 DIAGNOSIS — C49.9 SPINDLE CELL SARCOMA (H): ICD-10-CM

## 2022-01-20 LAB
ALBUMIN SERPL-MCNC: 3.2 G/DL (ref 3.4–5)
ALP SERPL-CCNC: 179 U/L (ref 40–150)
ALT SERPL W P-5'-P-CCNC: 21 U/L (ref 0–70)
ANION GAP SERPL CALCULATED.3IONS-SCNC: 5 MMOL/L (ref 3–14)
AST SERPL W P-5'-P-CCNC: 13 U/L (ref 0–45)
BASOPHILS # BLD MANUAL: 0 10E3/UL (ref 0–0.2)
BASOPHILS NFR BLD MANUAL: 0 %
BILIRUB SERPL-MCNC: 0.4 MG/DL (ref 0.2–1.3)
BUN SERPL-MCNC: 13 MG/DL (ref 7–30)
CALCIUM SERPL-MCNC: 9.1 MG/DL (ref 8.5–10.1)
CHLORIDE BLD-SCNC: 109 MMOL/L (ref 94–109)
CO2 SERPL-SCNC: 30 MMOL/L (ref 20–32)
CREAT SERPL-MCNC: 0.89 MG/DL (ref 0.66–1.25)
EOSINOPHIL # BLD MANUAL: 0.1 10E3/UL (ref 0–0.7)
EOSINOPHIL NFR BLD MANUAL: 1 %
ERYTHROCYTE [DISTWIDTH] IN BLOOD BY AUTOMATED COUNT: 17.2 % (ref 10–15)
GFR SERPL CREATININE-BSD FRML MDRD: 90 ML/MIN/1.73M2
GLUCOSE BLD-MCNC: 120 MG/DL (ref 70–99)
HCT VFR BLD AUTO: 25.8 % (ref 40–53)
HGB BLD-MCNC: 8.2 G/DL (ref 13.3–17.7)
LYMPHOCYTES # BLD MANUAL: 2.3 10E3/UL (ref 0.8–5.3)
LYMPHOCYTES NFR BLD MANUAL: 31 %
MAGNESIUM SERPL-MCNC: 2.1 MG/DL (ref 1.6–2.3)
MCH RBC QN AUTO: 29.8 PG (ref 26.5–33)
MCHC RBC AUTO-ENTMCNC: 31.8 G/DL (ref 31.5–36.5)
MCV RBC AUTO: 94 FL (ref 78–100)
METAMYELOCYTES # BLD MANUAL: 0.3 10E3/UL
METAMYELOCYTES NFR BLD MANUAL: 4 %
MONOCYTES # BLD MANUAL: 0.6 10E3/UL (ref 0–1.3)
MONOCYTES NFR BLD MANUAL: 8 %
NEUTROPHILS # BLD MANUAL: 4.2 10E3/UL (ref 1.6–8.3)
NEUTROPHILS NFR BLD MANUAL: 56 %
PHOSPHATE SERPL-MCNC: 2.5 MG/DL (ref 2.5–4.5)
PLAT MORPH BLD: ABNORMAL
PLATELET # BLD AUTO: 67 10E3/UL (ref 150–450)
POTASSIUM BLD-SCNC: 3.6 MMOL/L (ref 3.4–5.3)
PROT SERPL-MCNC: 6.4 G/DL (ref 6.8–8.8)
RBC # BLD AUTO: 2.75 10E6/UL (ref 4.4–5.9)
RBC MORPH BLD: ABNORMAL
SODIUM SERPL-SCNC: 144 MMOL/L (ref 133–144)
WBC # BLD AUTO: 7.5 10E3/UL (ref 4–11)

## 2022-01-20 PROCEDURE — 85027 COMPLETE CBC AUTOMATED: CPT | Performed by: PHYSICIAN ASSISTANT

## 2022-01-20 PROCEDURE — 36415 COLL VENOUS BLD VENIPUNCTURE: CPT

## 2022-01-20 PROCEDURE — 80053 COMPREHEN METABOLIC PANEL: CPT | Performed by: PHYSICIAN ASSISTANT

## 2022-01-20 PROCEDURE — 84100 ASSAY OF PHOSPHORUS: CPT | Performed by: PHYSICIAN ASSISTANT

## 2022-01-20 PROCEDURE — 83735 ASSAY OF MAGNESIUM: CPT | Performed by: PHYSICIAN ASSISTANT

## 2022-01-20 NOTE — PROGRESS NOTES
Infusion Nursing Note:  Ifrah Huitron presents today for IVFs.    Patient seen by provider today: No   present during visit today: Not Applicable.    Note: Labs drawn peripherally.      Intravenous Access:  N/A.    Treatment Conditions:  Lab Results   Component Value Date     01/20/2022    POTASSIUM 3.6 01/20/2022    MAG 2.1 01/20/2022    CR 0.89 01/20/2022    COTY 9.1 01/20/2022    BILITOTAL 0.4 01/20/2022    ALBUMIN 3.2 (L) 01/20/2022    ALT 21 01/20/2022    AST 13 01/20/2022     Results reviewed, labs did NOT meet treatment parameters: Crt <0.9, no electrolyte replacement needed. Pt. Reports adequate PO intake, no n/v or diarrhea, declines needing IV hydration today.      Post Infusion Assessment:  Patient not seen in Infusion Clinic today.       Discharge Plan:   Patient discharged in stable condition accompanied by: self.  Departure Mode: Ambulatory.      Warren Santana RN

## 2022-01-24 ENCOUNTER — VIRTUAL VISIT (OUTPATIENT)
Dept: OTOLARYNGOLOGY | Facility: CLINIC | Age: 72
End: 2022-01-24
Payer: MEDICARE

## 2022-01-24 ENCOUNTER — LAB (OUTPATIENT)
Dept: LAB | Facility: CLINIC | Age: 72
End: 2022-01-24
Payer: MEDICARE

## 2022-01-24 ENCOUNTER — INFUSION THERAPY VISIT (OUTPATIENT)
Dept: INFUSION THERAPY | Facility: CLINIC | Age: 72
End: 2022-01-24
Attending: PHYSICIAN ASSISTANT
Payer: MEDICARE

## 2022-01-24 DIAGNOSIS — C49.9 SPINDLE CELL SARCOMA (H): ICD-10-CM

## 2022-01-24 DIAGNOSIS — Z11.59 ENCOUNTER FOR SCREENING FOR OTHER VIRAL DISEASES: ICD-10-CM

## 2022-01-24 DIAGNOSIS — J38.01 VOCAL FOLD PARALYSIS, LEFT: ICD-10-CM

## 2022-01-24 DIAGNOSIS — R49.0 DYSPHONIA: Primary | ICD-10-CM

## 2022-01-24 LAB
ALBUMIN SERPL-MCNC: 3.2 G/DL (ref 3.4–5)
ALP SERPL-CCNC: 223 U/L (ref 40–150)
ALT SERPL W P-5'-P-CCNC: 22 U/L (ref 0–70)
ANION GAP SERPL CALCULATED.3IONS-SCNC: 3 MMOL/L (ref 3–14)
AST SERPL W P-5'-P-CCNC: 13 U/L (ref 0–45)
BASO STIPL BLD QL SMEAR: PRESENT
BASOPHILS # BLD MANUAL: 0.1 10E3/UL (ref 0–0.2)
BASOPHILS NFR BLD MANUAL: 1 %
BILIRUB SERPL-MCNC: 0.2 MG/DL (ref 0.2–1.3)
BUN SERPL-MCNC: 12 MG/DL (ref 7–30)
CALCIUM SERPL-MCNC: 9 MG/DL (ref 8.5–10.1)
CHLORIDE BLD-SCNC: 108 MMOL/L (ref 94–109)
CO2 SERPL-SCNC: 32 MMOL/L (ref 20–32)
CREAT SERPL-MCNC: 0.86 MG/DL (ref 0.66–1.25)
EOSINOPHIL # BLD MANUAL: 0 10E3/UL (ref 0–0.7)
EOSINOPHIL NFR BLD MANUAL: 0 %
ERYTHROCYTE [DISTWIDTH] IN BLOOD BY AUTOMATED COUNT: 19.1 % (ref 10–15)
GFR SERPL CREATININE-BSD FRML MDRD: >90 ML/MIN/1.73M2
GLUCOSE BLD-MCNC: 109 MG/DL (ref 70–99)
HCT VFR BLD AUTO: 27.7 % (ref 40–53)
HGB BLD-MCNC: 8.6 G/DL (ref 13.3–17.7)
LYMPHOCYTES # BLD MANUAL: 1.5 10E3/UL (ref 0.8–5.3)
LYMPHOCYTES NFR BLD MANUAL: 17 %
MAGNESIUM SERPL-MCNC: 2.1 MG/DL (ref 1.6–2.3)
MCH RBC QN AUTO: 29.8 PG (ref 26.5–33)
MCHC RBC AUTO-ENTMCNC: 31 G/DL (ref 31.5–36.5)
MCV RBC AUTO: 96 FL (ref 78–100)
METAMYELOCYTES # BLD MANUAL: 0.1 10E3/UL
METAMYELOCYTES NFR BLD MANUAL: 1 %
MONOCYTES # BLD MANUAL: 0.6 10E3/UL (ref 0–1.3)
MONOCYTES NFR BLD MANUAL: 7 %
NEUTROPHILS # BLD MANUAL: 6.7 10E3/UL (ref 1.6–8.3)
NEUTROPHILS NFR BLD MANUAL: 74 %
PHOSPHATE SERPL-MCNC: 3 MG/DL (ref 2.5–4.5)
PLAT MORPH BLD: ABNORMAL
PLATELET # BLD AUTO: 123 10E3/UL (ref 150–450)
POTASSIUM BLD-SCNC: 3.5 MMOL/L (ref 3.4–5.3)
PROT SERPL-MCNC: 6.7 G/DL (ref 6.8–8.8)
RBC # BLD AUTO: 2.89 10E6/UL (ref 4.4–5.9)
RBC MORPH BLD: ABNORMAL
SODIUM SERPL-SCNC: 143 MMOL/L (ref 133–144)
WBC # BLD AUTO: 9.1 10E3/UL (ref 4–11)

## 2022-01-24 PROCEDURE — 84100 ASSAY OF PHOSPHORUS: CPT | Performed by: PHYSICIAN ASSISTANT

## 2022-01-24 PROCEDURE — 36415 COLL VENOUS BLD VENIPUNCTURE: CPT

## 2022-01-24 PROCEDURE — 80053 COMPREHEN METABOLIC PANEL: CPT | Performed by: PHYSICIAN ASSISTANT

## 2022-01-24 PROCEDURE — 83735 ASSAY OF MAGNESIUM: CPT | Performed by: PHYSICIAN ASSISTANT

## 2022-01-24 PROCEDURE — 85027 COMPLETE CBC AUTOMATED: CPT | Performed by: PHYSICIAN ASSISTANT

## 2022-01-24 PROCEDURE — 92507 TX SP LANG VOICE COMM INDIV: CPT | Mod: GN | Performed by: SPEECH-LANGUAGE PATHOLOGIST

## 2022-01-24 PROCEDURE — 82040 ASSAY OF SERUM ALBUMIN: CPT | Performed by: PHYSICIAN ASSISTANT

## 2022-01-24 NOTE — LETTER
1/24/2022       RE: Ifrah Huitron  [address] [place] [address]     Dear Colleague,    Thank you for referring your patient, Ifrah Huitron, to the [facility] [place] at [facility]. Please see a copy of my visit note below.    Ifrah Huitron is a 71 year old male who is being evaluated via a billable video visit.      Ifrah has been notified and verbally consented to the following:     This video visit will be conducted between you and your provider.    Patient has opted to conduct today's video visit vs an in-person appointment.     Video visits are billed at different rates depending on your insurance coverage. Please reach out to your insurance provider with any questions.     If during the course of the call the provider feels the appointment is not appropriate, you will not be charged for this service.  Provider has received verbal consent for billable virtual visit from the patient? Yes  Will anyone else be joining your video visit? No    Call initiated at: 8:30 AM   Type of Visit Platform Used: Smart Balloon Video  Location of provider: Home  Location of patient: [facility] VOICE [facility]  Baldomero Boswell Jr., M.D., F.A.C.S.  Veronique Forbes M.D., M.P.H.  Sherry Esquivel M.D.  Tasha Bowie, Ph.D., CCC-SLP  Neil Smith, Ph.D., CCC-SLP  Janie Madrigal M.M. (voice), M.A., CCC-SLP  Danilo Lu M.M. (voice), M.A., CCC-SLP  CHERELLE Kraft (voice), M.S., CCC-SLP    [facility]  VOICE/SPEECH/BREATHING THERAPY PROGRESS REPORT    Patient: Ifrah Huitron  Date of Service: 1/24/2022    Date of Last Service: 1/7/22  Referring physician: Dr. Esquivel  Initial evaluation: 12/9/21    I had the pleasure of seeing Mr. 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 Huitron worked on therapeutic activities  to address the above diagnosis.    Regarding practice, Mr. Huitron reports the following:     no practice because he did not find the email of instructions, and he wasn't feeling well    Mr. Huitron also states that:    His cancer treatments interfered with his ability to even think about his voice    He has been dealing with severe thrush because of the chemotherapy    His children say there is an improvement in his voice    He is no longer getting lightheaded while talking    Today he has a sore throat, he thinks because he spent a lot of time talking to his grandson yesterday    Mr. Huitron presents today with the following:  Voice quality:    Mild, fine roughness, essentially glottal [place] very frequently throughout speech    Pitch is appropriate, at around A2 to D3    Bursts of clearer quality, when he is being less effortful    THERAPEUTIC ACTIVITIES  Today Mr. Huitron participated in the following therapeutic activities:    [place] techniques for applying massage to the tender areas of his neck, in order to reduce pain.    Reviewed all his previous exercises.  o I provided more explanation of the purpose for the straw with water exercises, and how he should use these  o I also provided more explanation of the benefit of the sensation of forward focus, as well as the benefit of the B-sentences to improve glottic closure gently  o We did not practice the straw exercises because he did not have a straw available    Practiced exercises for improved glottic closure.  o The piece sentences were improved when he tried to have a sensation of forward focus    [place] exercises to experience a more forward sensation during phonation.  o speech material with nasal continuants was facilitating  o able to recognize improvement in quality and comfort; he was able to have a more clear and less rough quality as he concentrated on forward sensation  o able to progress to level of a simple question-and-answer task using the carry  "phrase \"my favorite\"  o good learning and understanding, but will need practice    [place]urdette concepts of an optimal regimen for practice.  o he should use an interval schedule of practice, with brief periods of practice frequently throughout each day  o [place]dette concepts of volitional practice to facilitate motor learning.    An audio recording of today's therapeutic activities was provided, to facilitate practice.    IMPRESSIONS/GOALS/PLAN  Mr. 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     He will continue to work on his exercises on a daily basis, and work on incorporating the techniques into his daily activities.    Goals for this practice period:     practice all exercises according to instructions    incorporate techniques into daily vocal activities    maintain vigilance for vocal technique    Plan: I will see Mr. Huitron in three weeks to work on education, modification, and carryover of therapeutic activities to more complex activities.    Reporting period 12/9/21 - 3/9/22      TOTAL SERVICE TIME: 60 minutes  TREATMENT ([id number])  NO CHARGE FACILITY FEE    Tasha Bowie, Ph.D., [facility]-SLP  Speech-Language Pathologist  Director, [facility]  [phone]              "

## 2022-01-24 NOTE — PROGRESS NOTES
Ifrah Huitron is a 71 year old male who is being evaluated via a billable video visit.      Ifrah has been notified and verbally consented to the following:     This video visit will be conducted between you and your provider.    Patient has opted to conduct today's video visit vs an in-person appointment.     Video visits are billed at different rates depending on your insurance coverage. Please reach out to your insurance provider with any questions.     If during the course of the call the provider feels the appointment is not appropriate, you will not be charged for this service.  Provider has received verbal consent for billable virtual visit from the patient? Yes  Will anyone else be joining your video visit? No    Call initiated at: 8:30 AM   Type of Visit Platform Used: [employer] Video  Location of provider: Home  Location of patient: [facility] VOICE [facility]  Baldomero Boswell Jr., M.D., F.A.C.S.  Veronique Forbes M.D., M.P.H.  Sherry Esquivel M.D.  Tasha Bowie, Ph.D., CCC-SLP  Neil Smith, Ph.D., [facility]-SLP  Janie Madrigal M.M. (voice), M.A., CCC-SLP  Danilo Lu M.M. (voice), M.A., CCC-SLP  CHERELLE Kratf (voice), M.S., CCC-[facility]  VOICE/SPEECH/BREATHING THERAPY PROGRESS REPORT    Patient: Ifrah Huitron  Date of Service: 1/24/2022    Date of Last Service: 1/7/22  Referring physician: Dr. Esquivel  Initial evaluation: 12/9/21    I had the pleasure of seeing Mr. 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 Huitron worked on therapeutic activities to address the above diagnosis.    Regarding practice, Mr. Huitron reports the following:     no practice because he did not find the email of instructions, and he wasn't feeling well    Mr. Huitron also states that:    His cancer treatments interfered with his ability to even think about his voice    He has  "been dealing with severe thrush because of the chemotherapy    His children say there is an improvement in his voice    He is no longer getting lightheaded while talking    Today he has a sore throat, he thinks because he spent a lot of time talking to his grandson yesterday    Mr. Huitron presents today with the following:  Voice quality:    Mild, fine roughness, essentially glottal [place] very frequently throughout speech    Pitch is appropriate, at around A2 to D3    Bursts of clearer quality, when he is being less effortful    THERAPEUTIC ACTIVITIES  Today Mr. Huitron participated in the following therapeutic activities:    Nellie techniques for applying massage to the tender areas of his neck, in order to reduce pain.    Reviewed all his previous exercises.  o I provided more explanation of the purpose for the straw with water exercises, and how he should use these  o I also provided more explanation of the benefit of the sensation of forward focus, as well as the benefit of the B-sentences to improve glottic closure gently  o We did not practice the straw exercises because he did not have a straw available    Practiced exercises for improved glottic closure.  o The piece sentences were improved when he tried to have a sensation of forward focus    Nellie exercises to experience a more forward sensation during phonation.  o speech material with nasal continuants was facilitating  o able to recognize improvement in quality and comfort; he was able to have a more clear and less rough quality as he concentrated on forward sensation  o able to progress to level of a simple question-and-answer task using the carry phrase \"my favorite\"  o good learning and understanding, but will need practice    Nellie concepts of an optimal regimen for practice.  o he should use an interval schedule of practice, with brief periods of practice frequently throughout each day  o Nellie concepts of volitional practice to facilitate " motor learning.    An audio recording of today's therapeutic activities was provided, to facilitate practice.    IMPRESSIONS/GOALS/PLAN  Mr. 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     He will continue to work on his exercises on a daily basis, and work on incorporating the techniques into his daily activities.    Goals for this practice period:     practice all exercises according to instructions    incorporate techniques into daily vocal activities    maintain vigilance for vocal technique    Plan: I will see Mr. Huitron in three weeks to work on education, modification, and carryover of therapeutic activities to more complex activities.    Reporting period 12/9/21 - 3/9/22      TOTAL SERVICE TIME: 60 minutes  TREATMENT (36711)  NO CHARGE FACILITY FEE    Tasha Bowie, Ph.D., [facility]-SLP  Speech-Language Pathologist  Director, [facility]  [phone]

## 2022-01-24 NOTE — PROGRESS NOTES
Infusion Nursing Note:  Ifrah Huitron presents today for labs only today as fluids were not indicated.       Note:  Lab results were all within normal limits and creatinine was 0.86 so no IV fluids today.  Pt given copy of lab results.  He was not seen in infusion center.  Pt verbalized understanding of information provided and has no questions/concerns at this time.       Intravenous Access:  Labs drawn without difficulty.    Treatment Conditions:  Lab Results   Component Value Date     01/24/2022    POTASSIUM 3.5 01/24/2022    MAG 2.1 01/24/2022    CR 0.86 01/24/2022    COTY 9.0 01/24/2022    BILITOTAL 0.2 01/24/2022    ALBUMIN 3.2 (L) 01/24/2022    ALT 22 01/24/2022    AST 13 01/24/2022         Discharge Plan:   Patient discharged in stable condition accompanied by: self  Departure Mode: Ambulatory.  Pt to return on 1/27/22 at 10:20 am for labs followed by possible IV fluids.       Cathy Mann RN

## 2022-01-25 ENCOUNTER — LAB (OUTPATIENT)
Dept: LAB | Facility: CLINIC | Age: 72
End: 2022-01-25
Attending: PHYSICIAN ASSISTANT

## 2022-01-25 DIAGNOSIS — C49.9 SARCOMA (H): ICD-10-CM

## 2022-01-25 PROCEDURE — U0003 INFECTIOUS AGENT DETECTION BY NUCLEIC ACID (DNA OR RNA); SEVERE ACUTE RESPIRATORY SYNDROME CORONAVIRUS 2 (SARS-COV-2) (CORONAVIRUS DISEASE [COVID-19]), AMPLIFIED PROBE TECHNIQUE, MAKING USE OF HIGH THROUGHPUT TECHNOLOGIES AS DESCRIBED BY CMS-2020-01-R: HCPCS

## 2022-01-25 PROCEDURE — U0005 INFEC AGEN DETEC AMPLI PROBE: HCPCS

## 2022-01-26 ENCOUNTER — VIRTUAL VISIT (OUTPATIENT)
Dept: ONCOLOGY | Facility: CLINIC | Age: 72
End: 2022-01-26
Attending: PHYSICIAN ASSISTANT
Payer: MEDICARE

## 2022-01-26 DIAGNOSIS — T45.1X5A CHEMOTHERAPY-INDUCED NAUSEA: ICD-10-CM

## 2022-01-26 DIAGNOSIS — R11.0 CHEMOTHERAPY-INDUCED NAUSEA: ICD-10-CM

## 2022-01-26 DIAGNOSIS — D70.1 CHEMOTHERAPY-INDUCED NEUTROPENIA (H): ICD-10-CM

## 2022-01-26 DIAGNOSIS — T45.1X5A CHEMOTHERAPY-INDUCED NEUTROPENIA (H): ICD-10-CM

## 2022-01-26 DIAGNOSIS — C49.9 SARCOMA (H): Primary | ICD-10-CM

## 2022-01-26 LAB — SARS-COV-2 RNA RESP QL NAA+PROBE: NEGATIVE

## 2022-01-26 PROCEDURE — 99213 OFFICE O/P EST LOW 20 MIN: CPT | Mod: 95 | Performed by: PHYSICIAN ASSISTANT

## 2022-01-26 PROCEDURE — G0463 HOSPITAL OUTPT CLINIC VISIT: HCPCS | Mod: PN,RTG | Performed by: PHYSICIAN ASSISTANT

## 2022-01-26 NOTE — PROGRESS NOTES
Ifrah is a 71 year old who is being evaluated via a billable video visit.      How would you like to obtain your AVS? MyChart  If the video visit is dropped, the invitation should be resent by: Send to e-mail at: [email or web address]  Will anyone else be joining your video visit? No Wife Abida is there with him.    Amanda Morgan VF         Video Start Time: 10:55am    Video-Visit Details    Type of service:  Video Visit    Video End Time: 11:10am    Originating Location (pt. Location): Home    Distant Location (provider location):  [facility]     Platform used for Video Visit: [facility]    Oncology/Hematology Visit Note  Jan 26, 2022    Reason for Visit: Follow up of spindle cell sarcoma     History of Present Illness: Ifrah Huitron is a 71 year old male with PMH rosacea, pituitary macroadenoma s/p resection, GERD, kidney stones, DJD with metastatic spindle cell sarcoma. He presented to his PCP March 2021 with chest pain/left shoulder and back pain that led to imaging which revealed multiple lung mets. There were difficulties in getting diagnostic biopsy, eventually biopsy of mediastinal mass with spindle cell sarcoma. There was high PD-L1 expression (90%). Baseline imaging 6/21/21 with progression of lung mets and left-sided subdiaphragmatic mass, stable liver lesions. 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      He started Keytruda 6/21/21. CT 8/2/21 with positive response to treatment. CT 10/18/21 with mixed response- LUQ mass larger, anterior mediastinal and left anterior pleural mass smaller. Keytruda stopped.     Started on Doxil + Ifosfamide 10/26/21. Poorly tolerated with mucositis, nausea, poor oral intake. CT with stable to positive response.      Received C2 12/1/21 (delayed for tolerance issues) with 10% dose reduction.     Received C3 1/4/21 with same 10% dose reduction. He had issues with  "nausea inpatient along with recurrent thrush.    Interval History:  Ifrah was called today for follow-up. He overall is doing well. He does note low energy. His mouth sores are healed and no issues with thrush. Still has poor taste but is eating and drinking OK. Managing nausea with Zofran during the day and Zyprexa at night. Notes mild brain fog-had trouble doing division this AM. No other neuro concerns and no new neuropathy (has baseline tingling in feet that is unchanged).     Denies fevers, headaches, dizziness, chest pain, SOB, cough, abdominal pain, bowel or urinary concerns. Edema improved. No skin issues-doing lotion frequently which helps.     Current Outpatient Medications   Medication Sig Dispense Refill     acetaminophen (TYLENOL) 500 MG tablet Take 500-1,000 mg by mouth every 6 hours as needed for mild pain       calcium carbonate (TUMS) 500 MG chewable tablet Take 1 tablet (500 mg) by mouth 3 times daily as needed for heartburn       celecoxib (CELEBREX) 200 MG capsule Take 1 capsule (200 mg) by mouth 2 times daily. Note this is a new a strength! Only one capsule at a time! 60 capsule 3     cholecalciferol (VITAMIN D-1000 MAX ST) 1000 units TABS Take 1,000 Units by mouth daily        doxepin (SINEQUAN) 10 MG/ML (HIGH CONC) solution Take 2 mLs (20 mg) by mouth every 4 hours as needed for other (mouth pain) . Mix with equal parts tap water. Swish and hold in mouth ~60 seconds then spit out. 118 mL 3     fluconazole (DIFLUCAN) 200 MG tablet Take 1 tablet (200 mg) by mouth daily 7 tablet 0     guaiFENesin-codeine (GUAIFENESIN AC) 100-10 MG/5ML syrup Take 10 mLs by mouth every 4 hours as needed for cough 118 mL 0     hydrocortisone (CORTEF) 10 MG tablet Take 20 mg in the morning and 10 mg in the afternoon 270 tablet 3     Insulin Syringe-Needle U-100 27.5G X 5/8\" 2 ML MISC 1 each daily as needed (with solucortef for adrenal crisis) 10 each 1     levothyroxine (SYNTHROID/LEVOTHROID) 75 MCG tablet Take 1 " tablet (75 mcg) by mouth every morning 90 tablet 3     LORazepam (ATIVAN) 0.5 MG tablet Take 1 tablet (0.5 mg) by mouth every 4 hours as needed for nausea or vomiting . Caution: causes sedation. 30 tablet 0     Menthol-Methyl Salicylate (DALIA MALIK GREASELESS) cream Apply topically every 6 hours as needed       metroNIDAZOLE (METROGEL) 0.75 % external gel Apply topically 2 times daily 45 g 11     metroNIDAZOLE (METROGEL) 1 % external gel Apply topically daily .Try stronger dose due to increased symptoms after chemo. 30 g 0     nystatin (MYCOSTATIN) 495486 UNIT/ML suspension Take 5 mLs (500,000 Units) by mouth 4 times daily Hold while taking Fluconazole, could resume after Fluconazole if you have ongoing coating on tongue. 473 mL 0     OLANZapine (ZYPREXA) 5 MG tablet Take 1 tablet (5 mg) by mouth At Bedtime Continue until your nausea resolves 30 tablet 1     omeprazole (PRILOSEC) 20 MG DR capsule Take 2 capsules (40 mg) by mouth daily 60 capsule 1     ondansetron (ZOFRAN) 4 MG tablet Take 1-2 tablets (4-8 mg) by mouth every 8 hours as needed for nausea 60 tablet 3     phosphorus tablet 250 mg (PHOSPHA 250 NEUTRAL) 250 MG per tablet Take 2 tablets (500 mg) by mouth 2 times daily 60 tablet 1     potassium chloride ER (K-TAB) 20 MEQ CR tablet Take 1 tablet (20 mEq) by mouth 2 times daily 60 tablet 1     prochlorperazine (COMPAZINE) 5 MG tablet Take 1 tablet (5 mg) by mouth every 6 hours as needed for nausea or vomiting . Caution: causes sedation. 30 tablet 1     SUMAtriptan (IMITREX) 50 MG tablet Take 1 tablet (50 mg) by mouth at onset of headache for migraine May repeat in 2 hours. Max 4 tablets/24 hours. 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     Thyroid disease     removed pituitary gland     Past Surgical  History:   Procedure Laterality Date     COLONOSCOPY N/A 12/17/2020    Procedure: COLONOSCOPY;  Surgeon: Ken Camacho MD;  Location: [facility]     ENT SURGERY       HERNIA REPAIR       LARYNGOSCOPY, EXCISE VOCAL CORD LESION MICROSCOPIC, COMBINED Left 07/01/2021    Procedure: MICROLARYNGOSCOPY, LEFT TRUE VOCAL CORD INJECTION WITH PROLARYN;  Surgeon: Kyree Bearden MD;  Location: [facility]     PHACOEMULSIFICATION WITH STANDARD INTRAOCULAR LENS IMPLANT Right 03/10/2021    Procedure: Cataract removal with implant.;  Surgeon: Jamir Mac MD;  Location: [facility]     PHACOEMULSIFICATION WITH STANDARD INTRAOCULAR LENS IMPLANT Left 04/05/2021    Procedure: Cataract removal with implant.;  Surgeon: Jamir Mac MD;  Location: [facility]     PICC DOUBLE LUMEN PLACEMENT Right 12/01/2021    5FR DL PICC, basilic vein. L-38cm, 1cm out.     PICC DOUBLE LUMEN PLACEMENT Right 01/04/2022    Right cephalic, 41 cm, 1 external length     PITUITARY EXCISION       tooth pulled 4/7  Right      Allergies   Allergen Reactions     Penicillins Hives and Swelling            Social History   Social History     Tobacco Use     Smoking status: Never Smoker     Smokeless tobacco: Never Used   Substance Use Topics     Alcohol use: Yes     Comment: rare     Drug use: Never      Past medical history and social history were reviewed.    Physical Examination:  There were no vitals taken for this visit.  Wt Readings from Last 10 Encounters:   01/11/22 65.1 kg (143 lb 8 oz)   12/09/21 66.7 kg (147 lb)   12/09/21 67 kg (147 lb 11.2 oz)   12/08/21 64.8 kg (142 lb 13.7 oz)   11/12/21 68 kg (150 lb)   11/11/21 69.9 kg (154 lb)   11/04/21 70 kg (154 lb 6.4 oz)   11/02/21 85.9 kg (189 lb 6.4 oz)   09/17/21 73 kg (161 lb)   09/01/21 72.6 kg (160 lb)     Video physical exam  General: Patient appears well in no acute distress.   Skin: No visualized rash or lesions on visualized skin  Eyes: EOMI, no erythema, sclera icterus or discharge noted  Resp:  Appears to be breathing comfortably without accessory muscle usage, speaking in full sentences, no cough  MSK: Appears to have normal range of motion based on visualized movements  Neurologic: No apparent tremors, facial movements symmetric  Psych: affect normal, alert and oriented    The rest of a comprehensive physical examination is deferred due to PHE (public health emergency) video restrictions    Laboratory Data:  Results for MARISOL XIE (MRN [id number]) as of 1/26/2022 11:35   1/24/2022 10:49 1/25/2022 10:35   Sodium 143    Potassium 3.5    Chloride 108    Carbon Dioxide 32    Urea Nitrogen 12    Creatinine 0.86    GFR Estimate >90    Calcium 9.0    Anion Gap 3    Magnesium 2.1    Phosphorus 3.0    Albumin 3.2 (L)    Protein Total 6.7 (L)    Bilirubin Total 0.2    Alkaline Phosphatase 223 (H)    ALT 22    AST 13    Glucose 109 (H)    WBC 9.1    Hemoglobin 8.6 (L)    Hematocrit 27.7 (L)    Platelet Count 123 (L)    RBC Count 2.89 (L)    MCV 96    MCH 29.8    MCHC 31.0 (L)    RDW 19.1 (H)    % Neutrophils 74    % Lymphocytes 17    % Monocytes 7    % Eosinophils 0    % Basophils 1    % Metamyelocytes 1    Absolute Basophils 0.1    Absolute Neutrophil 6.7    Absolute Lymphocytes 1.5    Absolute Monocytes 0.6    Absolute Eosinophils 0.0    Absolute Metamyelocytes 0.1 (H)    RBC Morphology Confirmed RBC Indices    Platelet Morphology Automated Count Confirmed. Platelet morphology is normal.    Basophilic Stipling Present (A)    SARS CoV2 PCR  Negative       Assessment and Plan:  1. 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 High PD-L1. Was on Keytruda but had progression, now on Doxil + Ifos started 10/26/21.     Did not tolerate well-had significant mucositis, poor PO intake, nausea, and electrolyte derrangements. He has had less cancer pain and CT with stable disease. Cycle 2 given 12/1/21 with 10% dose reduction and did better. Cycle 3 given 1/4/22 at same dose as  C2- did have nausea and hypokalemia/hypophos.     He is more fatigued this cycle which I suspect is from worsening anemia. No need for transfusion. Will continue 2x weekly lab monitoring. Plan for admission for cycle 4 2/1/22. As long as labs stable will go ahead with same dose as last cycle.     Port scheduled 1/28/22. Pre-COVID test 1/25/22 negative.      Repeat imaging after cycle 4.      2. GI  Dyspepsia: Continue Omeprazole 40mg daily. Continue Tums PRN     CINV: Continue Zofran daily and Zyprexa at bedtime.Continue Compazine and Lorazepam PRN. Continue Emend inpatient on day 1 and day 6.      LFT elevation: 2/2 statin, improving     Gas: Continue Gas-x PRN      Diarrhea: Inpatient issue, C. Diff was negative, now resolved.      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 Cal Saba endocrine.     Hypothyroidism continue Synthroid 75mcg daily.      4. Derm  Rosacea: Long standing issue, continue Metrogel PRN     Hand/foot: 2/2 Doxil, iced hands during C3. Continue emollients at home. Monitor.      5. MSK  Left shoulder/back/chest wall pain 2/2 tumor, following with palliative. Continue topical Bengay and PO Celebrex BID. Pain improved. He will hold Celebrex prior to port placement.      Monitor tingling/neuropathy. Minimal.      6. ENT  Hoarseness: Thought 2/2 mediastinal mass vs irritation from prior biopsy. Following with ENT, s/p vocal cord infection 7/1/21. Will see our voice clinic in Dec due to ongoing issues. This is improved.     Mucositis/Thrush: Significant, 2/2 Doxil. Better with dose reduction. Continue salt/soda rinses. Finished fluconazole. Continue Nystatin.      7. Pulm/Cards  Dyspnea with talking, prior work-up with CT PE negative for PE, infection, pneumonitis; troponin negative; COVID negative. Sating well on RA. Agree with speech pathology thoughts on laryngeal dysfunction. Symptoms improved.       Continue Guaifenesin-Coedine PRN for cough, much improved.      Saw  cardiology, echo WNL. Has mild CAD, recommended to start on statin however now HELD for LFT elevation, improving.      Had elevated BP inpatient. Monitor. Hesitant to start antihypertensives given prior issues with dehydration and lightheadedness.      Continue compression stockings for edema 2/2 fluids from hospital.      8. FEN  Hypokalemia: 2/2 Ifos and poor PO intake. Currently on 20meq BID. If potassium remains WNL can cut back later this week.     Hypophosphatemia: 2/2 ifos and poor PO intake. Currently on 500mg BID. If phos remains WNL can cut back later this week.     Poor PO intake: 2/2 mucositis and nausea as above. Was poor with thrush, now improved.      Dehydration: Twice weekly fluids at [place], edema improved.      9. Psych  Anxiety/depression: Hx of issues, recurrent issue when he was feeling poorly from treatment. Now that he is physically doing better his mood is much improved.      Continue PRN Imitrex for migraines.     25 minutes spent on the date of the encounter doing chart review, review of test results, interpretation of tests, patient visit and documentation     Maynor Rios PA-C  Department of Hematology and Oncology  [facility]

## 2022-01-27 ENCOUNTER — APPOINTMENT (OUTPATIENT)
Dept: LAB | Facility: CLINIC | Age: 72
End: 2022-01-27
Payer: MEDICARE

## 2022-01-27 ENCOUNTER — INFUSION THERAPY VISIT (OUTPATIENT)
Dept: INFUSION THERAPY | Facility: CLINIC | Age: 72
End: 2022-01-27
Attending: PHYSICIAN ASSISTANT
Payer: MEDICARE

## 2022-01-27 DIAGNOSIS — C49.9 SPINDLE CELL SARCOMA (H): ICD-10-CM

## 2022-01-27 LAB
ALBUMIN SERPL-MCNC: 3.5 G/DL (ref 3.4–5)
ALP SERPL-CCNC: 210 U/L (ref 40–150)
ALT SERPL W P-5'-P-CCNC: 24 U/L (ref 0–70)
ANION GAP SERPL CALCULATED.3IONS-SCNC: <1 MMOL/L (ref 3–14)
AST SERPL W P-5'-P-CCNC: 16 U/L (ref 0–45)
BASOPHILS # BLD MANUAL: 0.1 10E3/UL (ref 0–0.2)
BASOPHILS NFR BLD MANUAL: 1 %
BILIRUB SERPL-MCNC: 0.3 MG/DL (ref 0.2–1.3)
BUN SERPL-MCNC: 16 MG/DL (ref 7–30)
CALCIUM SERPL-MCNC: 9.2 MG/DL (ref 8.5–10.1)
CHLORIDE BLD-SCNC: 109 MMOL/L (ref 94–109)
CO2 SERPL-SCNC: 33 MMOL/L (ref 20–32)
CREAT SERPL-MCNC: 0.88 MG/DL (ref 0.66–1.25)
EOSINOPHIL # BLD MANUAL: 0 10E3/UL (ref 0–0.7)
EOSINOPHIL NFR BLD MANUAL: 0 %
ERYTHROCYTE [DISTWIDTH] IN BLOOD BY AUTOMATED COUNT: 19.2 % (ref 10–15)
GFR SERPL CREATININE-BSD FRML MDRD: >90 ML/MIN/1.73M2
GLUCOSE BLD-MCNC: 88 MG/DL (ref 70–99)
HCT VFR BLD AUTO: 30.4 % (ref 40–53)
HGB BLD-MCNC: 9.4 G/DL (ref 13.3–17.7)
LYMPHOCYTES # BLD MANUAL: 1.1 10E3/UL (ref 0.8–5.3)
LYMPHOCYTES NFR BLD MANUAL: 11 %
MAGNESIUM SERPL-MCNC: 2.3 MG/DL (ref 1.6–2.3)
MCH RBC QN AUTO: 30.2 PG (ref 26.5–33)
MCHC RBC AUTO-ENTMCNC: 30.9 G/DL (ref 31.5–36.5)
MCV RBC AUTO: 98 FL (ref 78–100)
MONOCYTES # BLD MANUAL: 0.4 10E3/UL (ref 0–1.3)
MONOCYTES NFR BLD MANUAL: 4 %
NEUTROPHILS # BLD MANUAL: 8.2 10E3/UL (ref 1.6–8.3)
NEUTROPHILS NFR BLD MANUAL: 84 %
PHOSPHATE SERPL-MCNC: 3.4 MG/DL (ref 2.5–4.5)
PLAT MORPH BLD: NORMAL
PLATELET # BLD AUTO: 196 10E3/UL (ref 150–450)
POTASSIUM BLD-SCNC: 3.8 MMOL/L (ref 3.4–5.3)
PROT SERPL-MCNC: 6.9 G/DL (ref 6.8–8.8)
RBC # BLD AUTO: 3.11 10E6/UL (ref 4.4–5.9)
RBC MORPH BLD: NORMAL
SODIUM SERPL-SCNC: 142 MMOL/L (ref 133–144)
WBC # BLD AUTO: 9.8 10E3/UL (ref 4–11)

## 2022-01-27 PROCEDURE — 83735 ASSAY OF MAGNESIUM: CPT | Performed by: PHYSICIAN ASSISTANT

## 2022-01-27 PROCEDURE — 36415 COLL VENOUS BLD VENIPUNCTURE: CPT

## 2022-01-27 PROCEDURE — 82040 ASSAY OF SERUM ALBUMIN: CPT | Performed by: PHYSICIAN ASSISTANT

## 2022-01-27 PROCEDURE — 85027 COMPLETE CBC AUTOMATED: CPT | Performed by: PHYSICIAN ASSISTANT

## 2022-01-27 PROCEDURE — 84100 ASSAY OF PHOSPHORUS: CPT | Performed by: PHYSICIAN ASSISTANT

## 2022-01-27 PROCEDURE — 80053 COMPREHEN METABOLIC PANEL: CPT | Performed by: PHYSICIAN ASSISTANT

## 2022-01-27 NOTE — PROGRESS NOTES
Infusion Nursing Note:  Ifrah Huitron presents today for IVF and possible K+ replacement.    Patient seen by provider today: No   present during visit today: Not Applicable.    Note: N/A.      Treatment Conditions:  Lab Results   Component Value Date     01/27/2022    POTASSIUM 3.8 01/27/2022    MAG 2.3 01/27/2022    CR 0.88 01/27/2022    COTY 9.2 01/27/2022    BILITOTAL 0.3 01/27/2022    ALBUMIN 3.5 01/27/2022    ALT 24 01/27/2022    AST 16 01/27/2022     Results reviewed, labs did NOT meet treatment parameters: Cr 0.88  K+ 3.8    Sarahi Astudillo RN

## 2022-01-28 ENCOUNTER — HOSPITAL ENCOUNTER (OUTPATIENT)
Facility: AMBULATORY SURGERY CENTER | Age: 72
End: 2022-01-28
Attending: RADIOLOGY
Payer: MEDICARE

## 2022-01-28 ENCOUNTER — ANESTHESIA EVENT (OUTPATIENT)
Dept: SURGERY | Facility: AMBULATORY SURGERY CENTER | Age: 72
End: 2022-01-28
Payer: MEDICARE

## 2022-01-28 ENCOUNTER — ANCILLARY PROCEDURE (OUTPATIENT)
Dept: RADIOLOGY | Facility: AMBULATORY SURGERY CENTER | Age: 72
End: 2022-01-28
Attending: PHYSICIAN ASSISTANT
Payer: MEDICARE

## 2022-01-28 ENCOUNTER — ANESTHESIA (OUTPATIENT)
Dept: SURGERY | Facility: AMBULATORY SURGERY CENTER | Age: 72
End: 2022-01-28
Payer: MEDICARE

## 2022-01-28 VITALS
BODY MASS INDEX: 22.76 KG/M2 | WEIGHT: 145 LBS | HEART RATE: 72 BPM | OXYGEN SATURATION: 98 % | RESPIRATION RATE: 16 BRPM | DIASTOLIC BLOOD PRESSURE: 93 MMHG | TEMPERATURE: 97 F | HEIGHT: 67 IN | SYSTOLIC BLOOD PRESSURE: 148 MMHG

## 2022-01-28 DIAGNOSIS — E83.39 HYPOPHOSPHATEMIA: ICD-10-CM

## 2022-01-28 DIAGNOSIS — E87.6 HYPOKALEMIA: ICD-10-CM

## 2022-01-28 DIAGNOSIS — C49.9 SPINDLE CELL SARCOMA (H): ICD-10-CM

## 2022-01-28 PROCEDURE — 36561 INSERT TUNNELED CV CATH: CPT

## 2022-01-28 PROCEDURE — 76937 US GUIDE VASCULAR ACCESS: CPT | Mod: 26 | Performed by: RADIOLOGY

## 2022-01-28 PROCEDURE — 77001 FLUOROGUIDE FOR VEIN DEVICE: CPT | Mod: 26 | Performed by: RADIOLOGY

## 2022-01-28 PROCEDURE — 36561 INSERT TUNNELED CV CATH: CPT | Performed by: RADIOLOGY

## 2022-01-28 DEVICE — CATH PORT POWERPORT CLEARVUE SLIM 6FR 5616000: Type: IMPLANTABLE DEVICE | Site: CHEST | Status: FUNCTIONAL

## 2022-01-28 RX ORDER — POTASSIUM CHLORIDE 1500 MG/1
20 TABLET, EXTENDED RELEASE ORAL DAILY
Qty: 60 TABLET | Refills: 1 | COMMUNITY
Start: 2022-01-28 | End: 2022-02-10

## 2022-01-28 RX ORDER — HEPARIN SODIUM,PORCINE 10 UNIT/ML
5-10 VIAL (ML) INTRAVENOUS EVERY 24 HOURS
Status: DISCONTINUED | OUTPATIENT
Start: 2022-01-28 | End: 2022-01-29 | Stop reason: HOSPADM

## 2022-01-28 RX ORDER — SODIUM CHLORIDE, SODIUM LACTATE, POTASSIUM CHLORIDE, CALCIUM CHLORIDE 600; 310; 30; 20 MG/100ML; MG/100ML; MG/100ML; MG/100ML
INJECTION, SOLUTION INTRAVENOUS CONTINUOUS PRN
Status: DISCONTINUED | OUTPATIENT
Start: 2022-01-28 | End: 2022-01-28

## 2022-01-28 RX ORDER — LIDOCAINE HYDROCHLORIDE 10 MG/ML
INJECTION, SOLUTION EPIDURAL; INFILTRATION; INTRACAUDAL; PERINEURAL PRN
Status: DISCONTINUED | OUTPATIENT
Start: 2022-01-28 | End: 2022-01-28 | Stop reason: HOSPADM

## 2022-01-28 RX ORDER — CLINDAMYCIN PHOSPHATE 900 MG/50ML
900 INJECTION, SOLUTION INTRAVENOUS
Status: COMPLETED | OUTPATIENT
Start: 2022-01-28 | End: 2022-01-28

## 2022-01-28 RX ORDER — ACETAMINOPHEN 325 MG/1
650 TABLET ORAL
Status: DISCONTINUED | OUTPATIENT
Start: 2022-01-28 | End: 2022-01-29 | Stop reason: HOSPADM

## 2022-01-28 RX ORDER — HEPARIN SODIUM,PORCINE 10 UNIT/ML
5-10 VIAL (ML) INTRAVENOUS
Status: DISCONTINUED | OUTPATIENT
Start: 2022-01-28 | End: 2022-01-29 | Stop reason: HOSPADM

## 2022-01-28 RX ORDER — PROPOFOL 10 MG/ML
INJECTION, EMULSION INTRAVENOUS CONTINUOUS PRN
Status: DISCONTINUED | OUTPATIENT
Start: 2022-01-28 | End: 2022-01-28

## 2022-01-28 RX ORDER — HEPARIN SODIUM (PORCINE) LOCK FLUSH IV SOLN 100 UNIT/ML 100 UNIT/ML
5-10 SOLUTION INTRAVENOUS
Status: DISCONTINUED | OUTPATIENT
Start: 2022-01-28 | End: 2022-01-29 | Stop reason: HOSPADM

## 2022-01-28 RX ADMIN — PROPOFOL 150 MCG/KG/MIN: 10 INJECTION, EMULSION INTRAVENOUS at 09:00

## 2022-01-28 RX ADMIN — SODIUM CHLORIDE, SODIUM LACTATE, POTASSIUM CHLORIDE, CALCIUM CHLORIDE: 600; 310; 30; 20 INJECTION, SOLUTION INTRAVENOUS at 08:05

## 2022-01-28 RX ADMIN — CLINDAMYCIN PHOSPHATE 900 MG: 900 INJECTION, SOLUTION INTRAVENOUS at 08:45

## 2022-01-28 ASSESSMENT — MIFFLIN-ST. JEOR: SCORE: 1361.35

## 2022-01-28 NOTE — ANESTHESIA PREPROCEDURE EVALUATION
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]    Procedure : Procedure(s):  INSERTION, VASCULAR ACCESS PORT          Past Medical History:   Diagnosis Date     Arthritis      Mesothelioma, malignant (H) 2021     Spindle cell sarcoma (H) 2021     Thyroid disease     removed pituitary gland      Past Surgical History:   Procedure Laterality Date     COLONOSCOPY N/A 2020    Procedure: COLONOSCOPY;  Surgeon: Ken Camacho MD;  Location: [facility]     ENT SURGERY       HERNIA REPAIR       LARYNGOSCOPY, EXCISE VOCAL CORD LESION MICROSCOPIC, COMBINED Left 2021    Procedure: MICROLARYNGOSCOPY, LEFT TRUE VOCAL CORD INJECTION WITH PROLARYN;  Surgeon: Kyree Bearden MD;  Location: [facility]     PHACOEMULSIFICATION WITH STANDARD INTRAOCULAR LENS IMPLANT Right 03/10/2021    Procedure: Cataract removal with implant.;  Surgeon: Jamir Mac MD;  Location: [facility]     PHACOEMULSIFICATION WITH STANDARD INTRAOCULAR LENS IMPLANT Left 2021    Procedure: Cataract removal with implant.;  Surgeon: Jamir Mac MD;  Location: [facility]     PICC DOUBLE LUMEN PLACEMENT Right 2021    5FR DL PICC, basilic vein. L-38cm, 1cm out.     PICC DOUBLE LUMEN PLACEMENT Right 2022    Right cephalic, 41 cm, 1 external length     PITUITARY EXCISION       tooth pulled 4/7  Right       Allergies   Allergen Reactions     Penicillins Hives and Swelling             Social History     Tobacco Use     Smoking status: Never Smoker     Smokeless tobacco: Never Used   Substance Use Topics     Alcohol use: Yes     Comment: rare      Wt Readings from Last 1 Encounters:   22 65.8 kg (145 lb)        Anesthesia Evaluation   Pt has had prior anesthetic. Type: General and MAC.    No history of anesthetic complications       ROS/MED HX  ENT/Pulmonary: Comment: L vocal cord paralysis - neg pulmonary ROS      Neurologic:  - neg neurologic ROS     Cardiovascular:  - neg cardiovascular ROS   (+) -----Previous cardiac testing   Echo: Date: 10/27/21 Results:  Interpretation Summary  Left ventricular size, wall motion and function are normal. The ejection  fraction is 60-65%. Global peak LV longitudinal strain is averaged at -18.5%.  This is within reported normal limits (normal <-18%).  Right ventricular function, chamber size, wall motion, and thickness are  normal.  The inferior vena cava was normal in size with preserved respiratory  variability. No pericardial effusion is present.     There has been no change.  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  (+) Malignancy,     Other:  - neg other ROS          Physical Exam    Airway        Mallampati: III   TM distance: > 3 FB   Neck ROM: full   Mouth opening: < 3 cm    Respiratory Devices and Support         Dental  no notable dental history         Cardiovascular   cardiovascular exam normal          Pulmonary   pulmonary exam normal                OUTSIDE LABS:  CBC:   Lab Results   Component Value Date    WBC 9.8 01/27/2022    WBC 9.1 01/24/2022    HGB 9.4 (L) 01/27/2022    HGB 8.6 (L) 01/24/2022    HCT 30.4 (L) 01/27/2022    HCT 27.7 (L) 01/24/2022     01/27/2022     (L) 01/24/2022     BMP:   Lab Results   Component Value Date     01/27/2022     01/24/2022    POTASSIUM 3.8 01/27/2022    POTASSIUM 3.5 01/24/2022    CHLORIDE  109 01/27/2022    CHLORIDE 108 01/24/2022    CO2 33 (H) 01/27/2022    CO2 32 01/24/2022    BUN 16 01/27/2022    BUN 12 01/24/2022    CR 0.88 01/27/2022    CR 0.86 01/24/2022    GLC 88 01/27/2022     (H) 01/24/2022     COAGS:   Lab Results   Component Value Date    PTT 36 01/04/2022    INR 1.12 01/04/2022    FIBR 410 01/04/2022     POC:   Lab Results   Component Value Date    BGM 85 05/23/2019     HEPATIC:   Lab Results   Component Value Date    ALBUMIN 3.5 01/27/2022    PROTTOTAL 6.9 01/27/2022    ALT 24 01/27/2022    AST 16 01/27/2022    ALKPHOS 210 (H) 01/27/2022    BILITOTAL 0.3 01/27/2022     OTHER:   Lab Results   Component Value Date    LACT 0.8 11/08/2021    COTY 9.2 01/27/2022    PHOS 3.4 01/27/2022    MAG 2.3 01/27/2022    LIPASE 79 03/29/2021    TSH 0.74 10/04/2021    T4 1.46 11/18/2021    CRP 26.9 (H) 11/08/2021       Anesthesia Plan    ASA Status:  3   NPO Status:  NPO Appropriate    Anesthesia Type: MAC.     - Reason for MAC: immobility needed, straight local not clinically adequate, chronic cardiopulmonary disease   Induction: Intravenous.   Maintenance: TIVA.        Consents    Anesthesia Plan(s) and associated risks, benefits, and realistic alternatives discussed. Questions answered and patient/representative(s) expressed understanding.     - Discussed: Risks, Benefits and Alternatives for BOTH SEDATION and the PROCEDURE were discussed     - Discussed with:  Patient      - Extended Intubation/Ventilatory Support Discussed: No.      - Patient is DNR/DNI Status: No    Use of blood products discussed: No .     Postoperative Care    Pain management: Multi-modal analgesia, IV analgesics.   PONV prophylaxis: Background Propofol Infusion     Comments:                DONNA CABRAL MD

## 2022-01-28 NOTE — ANESTHESIA POSTPROCEDURE EVALUATION
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    Anesthesia Type:  MAC    Note:  Disposition: Outpatient   Postop Pain Control: Uneventful            Sign Out: Well controlled pain   PONV: No   Neuro/Psych: Uneventful            Sign Out: Acceptable/Baseline neuro status   Airway/Respiratory: Uneventful            Sign Out: Acceptable/Baseline resp. status   CV/Hemodynamics: Uneventful            Sign Out: Acceptable CV status; No obvious hypovolemia; No obvious fluid overload   Other NRE: NONE   DID A NON-ROUTINE EVENT OCCUR? No           Last vitals:  Vitals Value Taken Time   /89 01/28/22 0929   Temp 35.7  C (96.3  F) 01/28/22 0929   Pulse 70 01/28/22 0929   Resp 16 01/28/22 0929   SpO2         Electronically Signed By: DONNA CABRAL MD  January 28, 2022  9:36 AM

## 2022-01-28 NOTE — DISCHARGE INSTRUCTIONS
A collaboration between [facility] and [facility]  Experts in minimally invasive, targeted treatments performed using imaging guidance    Venous Access Device,  Port Catheter or Tunneled or Non-Tunneled Central Line Placement    Today you had a procedure today to install a venous access device; either a tunneled central vein catheter or a subcutaneous port catheter.    After you go home:  - Drink plenty of fluids.  Generally 6-8 (8 ounce) glasses a day is recommended.  - Resume your regular diet unless otherwise ordered by a medical provider.  - Keep any applied tape/gauze dressings clean and dry.  Change tape/gauze dressings if they get wet or soiled.  - You may shower the following day after procedure, however cover and protect from moisture any tape/gauze dressings.  You may let water hit and run over dried skin glue, but do not scrub.  Pat the area dry after showering.  - Port placement incisions are closed with absorbable suture, meaning they do not need to be removed at a later date, and a topical skin adhesive (skin glue).  This glue will wear off in 7-14 days.  Do not remove before this time.  If 14 days have passed and residual glue is present, you may gently remove it.  - Do not apply gels, lotions, or ointments to the glue site for the first 10 days as this may cause the glue to prematurely soften and fail.  - Do not perform strenuous activities or lift greater than 10 pounds for the next three days.  - If there is bleeding or oozing from the procedure site, apply firm pressure to the area for 5-10 minutes.  If the bleeding continues seek medical advice at the numbers below.  - Mild procedure site discomfort can be treated with an ice pack and over-the-counter pain relievers.        For 24 hours after any sedation used:  - Relax and take it easy.  No strenuous activities.  - Do not drive or operate machines at home or at work.  - No alcohol  consumption.  - Do not make any important or legal decisions.    Call our Interventional Radiology (IR) service if:  - If you start bleeding from the procedure site.  If you do start to bleed from the site, lie down and hold some pressure on the site.  Our radiology provider can help you decide if you need to return to the hospital.  - If you have new or worsening pain related to the procedure.  - If you have concerning swelling at the procedure site.  - If you develop persistent nausea or vomiting.  - If you develop hives or a rash or any unexplained itching.  - If you have a fever of greater than 100.5  F and chills in the first 5 days after procedure.  - Any other concerns related to your procedure.      [facility]  Interventional Radiology (IR)  [address]  [address] [facility] [place] [address]    Contact Number:  [phone]  (IR control desk)  - Monday - Friday 8:00 am - 4:30 pm    After hours for urgent concerns:  [phone]  - After 4:30 pm Monday - Friday, Weekends and Holidays.   - Ask for Interventional Radiology on-call.  Someone is available 24 hours a day.  - [facility] toll free number:  [phone]

## 2022-01-28 NOTE — ANESTHESIA CARE TRANSFER NOTE
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    Anesthesia Type:   MAC     Note:    Oropharynx: oropharynx clear of all foreign objects and spontaneously breathing  Level of Consciousness: awake  Oxygen Supplementation: room air    Independent Airway: airway patency satisfactory and stable  Dentition: dentition unchanged  Vital Signs Stable: post-procedure vital signs reviewed and stable  Report to RN Given: handoff report given  Patient transferred to: Phase II  Comments: Uneventful transport to Phase 2; IV patent; Pt responds appropriately to command; Pt comfortable; VSS: Report to PETER Orantes  Handoff Report: Identifed the Patient, Identified the Reponsible Provider, Reviewed the pertinent medical history, Discussed the surgical course, Reviewed Intra-OP anesthesia mangement and issues during anesthesia, Set expectations for post-procedure period and Allowed opportunity for questions and acknowledgement of understanding      Vitals:  Vitals Value Taken Time   /89 01/28/22 0929   Temp 35.7  C (96.3  F) 01/28/22 0929   Pulse 70 01/28/22 0929   Resp 16 01/28/22 0929   SpO2 99% room air        Electronically Signed By: JEVON PARKER CRNA  January 28, 2022  9:31 AM

## 2022-01-30 DIAGNOSIS — R11.0 CHEMOTHERAPY-INDUCED NAUSEA: ICD-10-CM

## 2022-01-30 DIAGNOSIS — T45.1X5A CHEMOTHERAPY-INDUCED NAUSEA: ICD-10-CM

## 2022-01-30 RX ORDER — ONDANSETRON 4 MG/1
4-8 TABLET, FILM COATED ORAL EVERY 8 HOURS PRN
Qty: 60 TABLET | Refills: 3 | Status: SHIPPED | OUTPATIENT
Start: 2022-01-30 | End: 2022-01-01

## 2022-01-31 ENCOUNTER — INFUSION THERAPY VISIT (OUTPATIENT)
Dept: INFUSION THERAPY | Facility: CLINIC | Age: 72
DRG: 846 | End: 2022-01-31
Attending: PHYSICIAN ASSISTANT
Payer: MEDICARE

## 2022-01-31 VITALS — HEART RATE: 88 BPM | SYSTOLIC BLOOD PRESSURE: 138 MMHG | TEMPERATURE: 97.9 F | DIASTOLIC BLOOD PRESSURE: 79 MMHG

## 2022-01-31 DIAGNOSIS — R11.0 CHEMOTHERAPY-INDUCED NAUSEA: ICD-10-CM

## 2022-01-31 DIAGNOSIS — T45.1X5A CHEMOTHERAPY-INDUCED NEUTROPENIA (H): ICD-10-CM

## 2022-01-31 DIAGNOSIS — T45.1X5A CHEMOTHERAPY-INDUCED NAUSEA: ICD-10-CM

## 2022-01-31 DIAGNOSIS — Z51.89 ENCOUNTER FOR OTHER SPECIFIED AFTERCARE: Primary | ICD-10-CM

## 2022-01-31 DIAGNOSIS — C49.9 SPINDLE CELL SARCOMA (H): ICD-10-CM

## 2022-01-31 DIAGNOSIS — C49.9 SARCOMA (H): Primary | ICD-10-CM

## 2022-01-31 DIAGNOSIS — D70.1 CHEMOTHERAPY-INDUCED NEUTROPENIA (H): ICD-10-CM

## 2022-01-31 DIAGNOSIS — C45.9 MESOTHELIOMA, MALIGNANT (H): ICD-10-CM

## 2022-01-31 LAB
ALBUMIN SERPL-MCNC: 3.3 G/DL (ref 3.4–5)
ALP SERPL-CCNC: 191 U/L (ref 40–150)
ALT SERPL W P-5'-P-CCNC: 25 U/L (ref 0–70)
ANION GAP SERPL CALCULATED.3IONS-SCNC: 4 MMOL/L (ref 3–14)
AST SERPL W P-5'-P-CCNC: 17 U/L (ref 0–45)
BASOPHILS # BLD AUTO: 0.1 10E3/UL (ref 0–0.2)
BASOPHILS NFR BLD AUTO: 1 %
BILIRUB SERPL-MCNC: 0.2 MG/DL (ref 0.2–1.3)
BUN SERPL-MCNC: 16 MG/DL (ref 7–30)
CALCIUM SERPL-MCNC: 9.1 MG/DL (ref 8.5–10.1)
CHLORIDE BLD-SCNC: 108 MMOL/L (ref 94–109)
CO2 SERPL-SCNC: 29 MMOL/L (ref 20–32)
CREAT SERPL-MCNC: 0.97 MG/DL (ref 0.66–1.25)
EOSINOPHIL # BLD AUTO: 0 10E3/UL (ref 0–0.7)
EOSINOPHIL NFR BLD AUTO: 0 %
ERYTHROCYTE [DISTWIDTH] IN BLOOD BY AUTOMATED COUNT: 18.7 % (ref 10–15)
GFR SERPL CREATININE-BSD FRML MDRD: 82 ML/MIN/1.73M2
GLUCOSE BLD-MCNC: 123 MG/DL (ref 70–99)
HCT VFR BLD AUTO: 30.4 % (ref 40–53)
HGB BLD-MCNC: 9.5 G/DL (ref 13.3–17.7)
IMM GRANULOCYTES # BLD: 0.3 10E3/UL
IMM GRANULOCYTES NFR BLD: 3 %
LYMPHOCYTES # BLD AUTO: 1.3 10E3/UL (ref 0.8–5.3)
LYMPHOCYTES NFR BLD AUTO: 13 %
MAGNESIUM SERPL-MCNC: 1.8 MG/DL (ref 1.6–2.3)
MCH RBC QN AUTO: 30.4 PG (ref 26.5–33)
MCHC RBC AUTO-ENTMCNC: 31.3 G/DL (ref 31.5–36.5)
MCV RBC AUTO: 97 FL (ref 78–100)
MONOCYTES # BLD AUTO: 1.4 10E3/UL (ref 0–1.3)
MONOCYTES NFR BLD AUTO: 15 %
NEUTROPHILS # BLD AUTO: 6.4 10E3/UL (ref 1.6–8.3)
NEUTROPHILS NFR BLD AUTO: 68 %
NRBC # BLD AUTO: 0 10E3/UL
NRBC BLD AUTO-RTO: 0 /100
PHOSPHATE SERPL-MCNC: 2.5 MG/DL (ref 2.5–4.5)
PLATELET # BLD AUTO: 237 10E3/UL (ref 150–450)
POTASSIUM BLD-SCNC: 3.5 MMOL/L (ref 3.4–5.3)
PROT SERPL-MCNC: 6.9 G/DL (ref 6.8–8.8)
RBC # BLD AUTO: 3.12 10E6/UL (ref 4.4–5.9)
SODIUM SERPL-SCNC: 141 MMOL/L (ref 133–144)
WBC # BLD AUTO: 9.4 10E3/UL (ref 4–11)

## 2022-01-31 PROCEDURE — 85025 COMPLETE CBC W/AUTO DIFF WBC: CPT | Performed by: PHYSICIAN ASSISTANT

## 2022-01-31 PROCEDURE — 80053 COMPREHEN METABOLIC PANEL: CPT | Performed by: PHYSICIAN ASSISTANT

## 2022-01-31 PROCEDURE — 250N000011 HC RX IP 250 OP 636: Performed by: PHYSICIAN ASSISTANT

## 2022-01-31 PROCEDURE — 36591 DRAW BLOOD OFF VENOUS DEVICE: CPT

## 2022-01-31 PROCEDURE — 96360 HYDRATION IV INFUSION INIT: CPT

## 2022-01-31 PROCEDURE — 258N000003 HC RX IP 258 OP 636: Performed by: PHYSICIAN ASSISTANT

## 2022-01-31 PROCEDURE — 83735 ASSAY OF MAGNESIUM: CPT | Performed by: PHYSICIAN ASSISTANT

## 2022-01-31 PROCEDURE — 84100 ASSAY OF PHOSPHORUS: CPT | Performed by: PHYSICIAN ASSISTANT

## 2022-01-31 RX ORDER — PROCHLORPERAZINE MALEATE 5 MG
5 TABLET ORAL EVERY 6 HOURS PRN
Status: CANCELLED | OUTPATIENT
Start: 2022-02-01

## 2022-01-31 RX ORDER — ALBUTEROL SULFATE 90 UG/1
1-2 AEROSOL, METERED RESPIRATORY (INHALATION)
Status: CANCELLED | OUTPATIENT
Start: 2022-02-01

## 2022-01-31 RX ORDER — DEXTROSE MONOHYDRATE 50 MG/ML
10-20 INJECTION, SOLUTION INTRAVENOUS
Status: CANCELLED | OUTPATIENT
Start: 2022-02-08

## 2022-01-31 RX ORDER — DIPHENHYDRAMINE HYDROCHLORIDE 50 MG/ML
50 INJECTION INTRAMUSCULAR; INTRAVENOUS
Status: CANCELLED | OUTPATIENT
Start: 2022-02-01

## 2022-01-31 RX ORDER — HEPARIN SODIUM (PORCINE) LOCK FLUSH IV SOLN 100 UNIT/ML 100 UNIT/ML
500 SOLUTION INTRAVENOUS ONCE
Status: COMPLETED | OUTPATIENT
Start: 2022-01-31 | End: 2022-01-31

## 2022-01-31 RX ORDER — MEPERIDINE HYDROCHLORIDE 25 MG/ML
25 INJECTION INTRAMUSCULAR; INTRAVENOUS; SUBCUTANEOUS EVERY 30 MIN PRN
Status: CANCELLED | OUTPATIENT
Start: 2022-02-01

## 2022-01-31 RX ORDER — NALOXONE HYDROCHLORIDE 0.4 MG/ML
0.2 INJECTION, SOLUTION INTRAMUSCULAR; INTRAVENOUS; SUBCUTANEOUS
Status: CANCELLED | OUTPATIENT
Start: 2022-02-01

## 2022-01-31 RX ORDER — METHYLPREDNISOLONE SODIUM SUCCINATE 125 MG/2ML
125 INJECTION, POWDER, LYOPHILIZED, FOR SOLUTION INTRAMUSCULAR; INTRAVENOUS
Status: CANCELLED | OUTPATIENT
Start: 2022-02-01

## 2022-01-31 RX ORDER — ONDANSETRON 4 MG/1
8 TABLET, FILM COATED ORAL EVERY 8 HOURS
Status: CANCELLED | OUTPATIENT
Start: 2022-02-01

## 2022-01-31 RX ORDER — EPINEPHRINE 1 MG/ML
0.3 INJECTION, SOLUTION, CONCENTRATE INTRAVENOUS EVERY 5 MIN PRN
Status: CANCELLED | OUTPATIENT
Start: 2022-02-01

## 2022-01-31 RX ORDER — ALBUTEROL SULFATE 0.83 MG/ML
2.5 SOLUTION RESPIRATORY (INHALATION)
Status: CANCELLED | OUTPATIENT
Start: 2022-02-01

## 2022-01-31 RX ADMIN — SODIUM CHLORIDE 1000 ML: 9 INJECTION, SOLUTION INTRAVENOUS at 11:00

## 2022-01-31 RX ADMIN — Medication 500 UNITS: at 12:03

## 2022-01-31 NOTE — PROGRESS NOTES
1/31/22    Labs reviewed and all improved. Good to go ahead with treatment this week. RNCC helping to arrange admission. Keep same doses as last two cycles with better tolerance.     Maynor Rios PA-C

## 2022-01-31 NOTE — PROGRESS NOTES
Infusion Nursing Note:  Ifrah Huitron presents today for IVF.    Patient seen by provider today: No   present during visit today: Not Applicable.    Note: Patient states he is not feeling well today. He is hopeful the fluids will help.     Intravenous Access:  Implanted Port.    Treatment Conditions:  Results reviewed, labs MET treatment parameters, ok to proceed with treatment.  Creat 0.97    Post Infusion Assessment:  Patient tolerated infusion without incident.  Site patent and intact, free from redness, edema or discomfort.  Access discontinued per protocol.       Discharge Plan:   Patient discharged in stable condition accompanied by: self.  Departure Mode: Ambulatory. Pt will return 2/3/2022.       Anup Velasco RN

## 2022-02-02 ENCOUNTER — HOSPITAL ENCOUNTER (INPATIENT)
Facility: CLINIC | Age: 72
LOS: 6 days | Discharge: HOME | DRG: 846 | End: 2022-02-08
Attending: INTERNAL MEDICINE | Admitting: PEDIATRICS
Payer: MEDICARE

## 2022-02-02 DIAGNOSIS — C45.9 MESOTHELIOMA, MALIGNANT (H): ICD-10-CM

## 2022-02-02 DIAGNOSIS — B37.0 THRUSH: ICD-10-CM

## 2022-02-02 DIAGNOSIS — D70.1 CHEMOTHERAPY-INDUCED NEUTROPENIA (H): Primary | ICD-10-CM

## 2022-02-02 DIAGNOSIS — G89.3 CANCER ASSOCIATED PAIN: ICD-10-CM

## 2022-02-02 DIAGNOSIS — T45.1X5A CHEMOTHERAPY-INDUCED NEUTROPENIA (H): Primary | ICD-10-CM

## 2022-02-02 DIAGNOSIS — C49.9 SPINDLE CELL SARCOMA (H): ICD-10-CM

## 2022-02-02 DIAGNOSIS — F41.1 GAD (GENERALIZED ANXIETY DISORDER): ICD-10-CM

## 2022-02-02 LAB
ALBUMIN SERPL-MCNC: 3.2 G/DL (ref 3.4–5)
ALP SERPL-CCNC: 204 U/L (ref 40–150)
ALT SERPL W P-5'-P-CCNC: 48 U/L (ref 0–70)
ANION GAP SERPL CALCULATED.3IONS-SCNC: 4 MMOL/L (ref 3–14)
AST SERPL W P-5'-P-CCNC: 40 U/L (ref 0–45)
BASOPHILS # BLD AUTO: 0.1 10E3/UL (ref 0–0.2)
BASOPHILS # BLD AUTO: 0.1 10E3/UL (ref 0–0.2)
BASOPHILS NFR BLD AUTO: 1 %
BASOPHILS NFR BLD AUTO: 1 %
BILIRUB SERPL-MCNC: 1.3 MG/DL (ref 0.2–1.3)
BUN SERPL-MCNC: 19 MG/DL (ref 7–30)
CALCIUM SERPL-MCNC: 9.2 MG/DL (ref 8.5–10.1)
CHLORIDE BLD-SCNC: 108 MMOL/L (ref 94–109)
CO2 SERPL-SCNC: 29 MMOL/L (ref 20–32)
CREAT SERPL-MCNC: 1.08 MG/DL (ref 0.66–1.25)
EOSINOPHIL # BLD AUTO: 0 10E3/UL (ref 0–0.7)
EOSINOPHIL # BLD AUTO: 0 10E3/UL (ref 0–0.7)
EOSINOPHIL NFR BLD AUTO: 0 %
EOSINOPHIL NFR BLD AUTO: 0 %
ERYTHROCYTE [DISTWIDTH] IN BLOOD BY AUTOMATED COUNT: 18.6 % (ref 10–15)
GFR SERPL CREATININE-BSD FRML MDRD: 72 ML/MIN/1.73M2
GLUCOSE BLD-MCNC: 114 MG/DL (ref 70–99)
HCT VFR BLD AUTO: 31.4 % (ref 40–53)
HGB BLD-MCNC: 9.3 G/DL (ref 13.3–17.7)
IMM GRANULOCYTES # BLD: 0.4 10E3/UL
IMM GRANULOCYTES # BLD: 0.4 10E3/UL
IMM GRANULOCYTES NFR BLD: 3 %
IMM GRANULOCYTES NFR BLD: 3 %
LYMPHOCYTES # BLD AUTO: 1.2 10E3/UL (ref 0.8–5.3)
LYMPHOCYTES # BLD AUTO: 1.2 10E3/UL (ref 0.8–5.3)
LYMPHOCYTES NFR BLD AUTO: 12 %
LYMPHOCYTES NFR BLD AUTO: 12 %
MAGNESIUM SERPL-MCNC: 2.5 MG/DL (ref 1.6–2.3)
MCH RBC QN AUTO: 29.2 PG (ref 26.5–33)
MCHC RBC AUTO-ENTMCNC: 29.6 G/DL (ref 31.5–36.5)
MCV RBC AUTO: 98 FL (ref 78–100)
MONOCYTES # BLD AUTO: 1.4 10E3/UL (ref 0–1.3)
MONOCYTES # BLD AUTO: 1.4 10E3/UL (ref 0–1.3)
MONOCYTES NFR BLD AUTO: 13 %
MONOCYTES NFR BLD AUTO: 13 %
NEUTROPHILS # BLD AUTO: 7.3 10E3/UL (ref 1.6–8.3)
NEUTROPHILS # BLD AUTO: 7.3 10E3/UL (ref 1.6–8.3)
NEUTROPHILS NFR BLD AUTO: 71 %
NEUTROPHILS NFR BLD AUTO: 71 %
NRBC # BLD AUTO: 0 10E3/UL
NRBC # BLD AUTO: 0 10E3/UL
NRBC BLD AUTO-RTO: 0 /100
NRBC BLD AUTO-RTO: 0 /100
PHOSPHATE SERPL-MCNC: 2.8 MG/DL (ref 2.5–4.5)
PLATELET # BLD AUTO: 275 10E3/UL (ref 150–450)
POTASSIUM BLD-SCNC: 4.1 MMOL/L (ref 3.4–5.3)
PROT SERPL-MCNC: 7.1 G/DL (ref 6.8–8.8)
RBC # BLD AUTO: 3.19 10E6/UL (ref 4.4–5.9)
SARS-COV-2 RNA RESP QL NAA+PROBE: NEGATIVE
SODIUM SERPL-SCNC: 141 MMOL/L (ref 133–144)
WBC # BLD AUTO: 10.3 10E3/UL (ref 4–11)
WBC # BLD AUTO: 10.3 10E3/UL (ref 4–11)

## 2022-02-02 PROCEDURE — 99223 1ST HOSP IP/OBS HIGH 75: CPT | Mod: AI | Performed by: PEDIATRICS

## 2022-02-02 PROCEDURE — 83735 ASSAY OF MAGNESIUM: CPT | Performed by: PEDIATRICS

## 2022-02-02 PROCEDURE — 250N000011 HC RX IP 250 OP 636: Performed by: PHYSICIAN ASSISTANT

## 2022-02-02 PROCEDURE — 3E04305 INTRODUCTION OF OTHER ANTINEOPLASTIC INTO CENTRAL VEIN, PERCUTANEOUS APPROACH: ICD-10-PCS | Performed by: PEDIATRICS

## 2022-02-02 PROCEDURE — 250N000009 HC RX 250: Performed by: PEDIATRICS

## 2022-02-02 PROCEDURE — 84100 ASSAY OF PHOSPHORUS: CPT | Performed by: PEDIATRICS

## 2022-02-02 PROCEDURE — 120N000002 HC R&B MED SURG/OB UMMC

## 2022-02-02 PROCEDURE — 250N000013 HC RX MED GY IP 250 OP 250 PS 637: Performed by: PHYSICIAN ASSISTANT

## 2022-02-02 PROCEDURE — 258N000003 HC RX IP 258 OP 636: Performed by: PHYSICIAN ASSISTANT

## 2022-02-02 PROCEDURE — U0003 INFECTIOUS AGENT DETECTION BY NUCLEIC ACID (DNA OR RNA); SEVERE ACUTE RESPIRATORY SYNDROME CORONAVIRUS 2 (SARS-COV-2) (CORONAVIRUS DISEASE [COVID-19]), AMPLIFIED PROBE TECHNIQUE, MAKING USE OF HIGH THROUGHPUT TECHNOLOGIES AS DESCRIBED BY CMS-2020-01-R: HCPCS | Performed by: PEDIATRICS

## 2022-02-02 PROCEDURE — 36415 COLL VENOUS BLD VENIPUNCTURE: CPT | Performed by: STUDENT IN AN ORGANIZED HEALTH CARE EDUCATION/TRAINING PROGRAM

## 2022-02-02 PROCEDURE — 80053 COMPREHEN METABOLIC PANEL: CPT | Performed by: STUDENT IN AN ORGANIZED HEALTH CARE EDUCATION/TRAINING PROGRAM

## 2022-02-02 PROCEDURE — 85025 COMPLETE CBC W/AUTO DIFF WBC: CPT | Performed by: STUDENT IN AN ORGANIZED HEALTH CARE EDUCATION/TRAINING PROGRAM

## 2022-02-02 RX ORDER — METRONIDAZOLE 7.5 MG/G
GEL TOPICAL 2 TIMES DAILY
Status: DISCONTINUED | OUTPATIENT
Start: 2022-02-02 | End: 2022-02-08 | Stop reason: HOSPADM

## 2022-02-02 RX ORDER — LORAZEPAM 0.5 MG/1
0.5 TABLET ORAL EVERY 4 HOURS PRN
Status: DISCONTINUED | OUTPATIENT
Start: 2022-02-02 | End: 2022-02-06

## 2022-02-02 RX ORDER — CARBOXYMETHYLCELLULOSE SODIUM 5 MG/ML
1 SOLUTION/ DROPS OPHTHALMIC
Status: DISCONTINUED | OUTPATIENT
Start: 2022-02-02 | End: 2022-02-08 | Stop reason: HOSPADM

## 2022-02-02 RX ORDER — OLANZAPINE 5 MG/1
5 TABLET ORAL AT BEDTIME
Status: DISCONTINUED | OUTPATIENT
Start: 2022-02-02 | End: 2022-02-08 | Stop reason: HOSPADM

## 2022-02-02 RX ORDER — METHYLPREDNISOLONE SODIUM SUCCINATE 125 MG/2ML
125 INJECTION, POWDER, LYOPHILIZED, FOR SOLUTION INTRAMUSCULAR; INTRAVENOUS
Status: DISCONTINUED | OUTPATIENT
Start: 2022-02-02 | End: 2022-02-08 | Stop reason: HOSPADM

## 2022-02-02 RX ORDER — MEPERIDINE HYDROCHLORIDE 25 MG/ML
25 INJECTION INTRAMUSCULAR; INTRAVENOUS; SUBCUTANEOUS EVERY 30 MIN PRN
Status: DISCONTINUED | OUTPATIENT
Start: 2022-02-02 | End: 2022-02-08 | Stop reason: HOSPADM

## 2022-02-02 RX ORDER — ACETAMINOPHEN 500 MG
500-1000 TABLET ORAL EVERY 6 HOURS PRN
Status: DISCONTINUED | OUTPATIENT
Start: 2022-02-02 | End: 2022-02-08 | Stop reason: HOSPADM

## 2022-02-02 RX ORDER — VITAMIN B COMPLEX
25 TABLET ORAL DAILY
Status: DISCONTINUED | OUTPATIENT
Start: 2022-02-02 | End: 2022-02-08 | Stop reason: HOSPADM

## 2022-02-02 RX ORDER — NYSTATIN 100000/ML
500000 SUSPENSION, ORAL (FINAL DOSE FORM) ORAL 4 TIMES DAILY
Status: DISCONTINUED | OUTPATIENT
Start: 2022-02-02 | End: 2022-02-08 | Stop reason: HOSPADM

## 2022-02-02 RX ORDER — DEXTROSE MONOHYDRATE 50 MG/ML
10-20 INJECTION, SOLUTION INTRAVENOUS
Status: DISCONTINUED | OUTPATIENT
Start: 2022-02-09 | End: 2022-02-08 | Stop reason: HOSPADM

## 2022-02-02 RX ORDER — POTASSIUM CHLORIDE 750 MG/1
20 TABLET, EXTENDED RELEASE ORAL DAILY
Status: DISCONTINUED | OUTPATIENT
Start: 2022-02-02 | End: 2022-02-08 | Stop reason: HOSPADM

## 2022-02-02 RX ORDER — CELECOXIB 200 MG/1
200 CAPSULE ORAL 2 TIMES DAILY
Status: DISCONTINUED | OUTPATIENT
Start: 2022-02-02 | End: 2022-02-03

## 2022-02-02 RX ORDER — PANTOPRAZOLE SODIUM 40 MG/1
40 TABLET, DELAYED RELEASE ORAL DAILY
Status: DISCONTINUED | OUTPATIENT
Start: 2022-02-02 | End: 2022-02-08 | Stop reason: HOSPADM

## 2022-02-02 RX ORDER — HEPARIN SODIUM,PORCINE 10 UNIT/ML
5-10 VIAL (ML) INTRAVENOUS EVERY 24 HOURS
Status: DISCONTINUED | OUTPATIENT
Start: 2022-02-02 | End: 2022-02-02 | Stop reason: CLARIF

## 2022-02-02 RX ORDER — PROCHLORPERAZINE MALEATE 5 MG
5 TABLET ORAL EVERY 6 HOURS PRN
Status: DISCONTINUED | OUTPATIENT
Start: 2022-02-02 | End: 2022-02-02

## 2022-02-02 RX ORDER — ALBUTEROL SULFATE 0.83 MG/ML
2.5 SOLUTION RESPIRATORY (INHALATION)
Status: DISCONTINUED | OUTPATIENT
Start: 2022-02-02 | End: 2022-02-08 | Stop reason: HOSPADM

## 2022-02-02 RX ORDER — ONDANSETRON 4 MG/1
4-8 TABLET, FILM COATED ORAL EVERY 8 HOURS PRN
Status: DISCONTINUED | OUTPATIENT
Start: 2022-02-02 | End: 2022-02-08 | Stop reason: HOSPADM

## 2022-02-02 RX ORDER — HEPARIN SODIUM (PORCINE) LOCK FLUSH IV SOLN 100 UNIT/ML 100 UNIT/ML
5-10 SOLUTION INTRAVENOUS
Status: DISCONTINUED | OUTPATIENT
Start: 2022-02-02 | End: 2022-02-02 | Stop reason: CLARIF

## 2022-02-02 RX ORDER — HEPARIN SODIUM,PORCINE 10 UNIT/ML
5-10 VIAL (ML) INTRAVENOUS
Status: DISCONTINUED | OUTPATIENT
Start: 2022-02-02 | End: 2022-02-08 | Stop reason: HOSPADM

## 2022-02-02 RX ORDER — NALOXONE HYDROCHLORIDE 0.4 MG/ML
0.2 INJECTION, SOLUTION INTRAMUSCULAR; INTRAVENOUS; SUBCUTANEOUS
Status: DISCONTINUED | OUTPATIENT
Start: 2022-02-02 | End: 2022-02-08 | Stop reason: HOSPADM

## 2022-02-02 RX ORDER — TRIAMCINOLONE ACETONIDE 1 MG/G
CREAM TOPICAL 2 TIMES DAILY
Status: DISCONTINUED | OUTPATIENT
Start: 2022-02-02 | End: 2022-02-08 | Stop reason: HOSPADM

## 2022-02-02 RX ORDER — ONDANSETRON 8 MG/1
8 TABLET, FILM COATED ORAL EVERY 8 HOURS
Status: DISCONTINUED | OUTPATIENT
Start: 2022-02-02 | End: 2022-02-08 | Stop reason: HOSPADM

## 2022-02-02 RX ORDER — HYDROCORTISONE 10 MG/1
10 TABLET ORAL DAILY
Status: DISCONTINUED | OUTPATIENT
Start: 2022-02-02 | End: 2022-02-08 | Stop reason: HOSPADM

## 2022-02-02 RX ORDER — PROCHLORPERAZINE MALEATE 5 MG
5 TABLET ORAL EVERY 6 HOURS PRN
Status: DISCONTINUED | OUTPATIENT
Start: 2022-02-02 | End: 2022-02-08 | Stop reason: HOSPADM

## 2022-02-02 RX ORDER — LIDOCAINE/PRILOCAINE 2.5 %-2.5%
CREAM (GRAM) TOPICAL
Status: DISCONTINUED | OUTPATIENT
Start: 2022-02-02 | End: 2022-02-08 | Stop reason: HOSPADM

## 2022-02-02 RX ORDER — DIPHENHYDRAMINE HYDROCHLORIDE 50 MG/ML
50 INJECTION INTRAMUSCULAR; INTRAVENOUS
Status: DISCONTINUED | OUTPATIENT
Start: 2022-02-02 | End: 2022-02-08 | Stop reason: HOSPADM

## 2022-02-02 RX ORDER — SUMATRIPTAN 50 MG/1
50 TABLET, FILM COATED ORAL
Status: DISCONTINUED | OUTPATIENT
Start: 2022-02-02 | End: 2022-02-08 | Stop reason: HOSPADM

## 2022-02-02 RX ORDER — DOXEPIN HYDROCHLORIDE 10 MG/ML
20 SOLUTION ORAL EVERY 4 HOURS PRN
Status: DISCONTINUED | OUTPATIENT
Start: 2022-02-02 | End: 2022-02-08 | Stop reason: HOSPADM

## 2022-02-02 RX ORDER — CALCIUM CARBONATE 500 MG/1
500 TABLET, CHEWABLE ORAL 3 TIMES DAILY PRN
Status: DISCONTINUED | OUTPATIENT
Start: 2022-02-02 | End: 2022-02-08 | Stop reason: HOSPADM

## 2022-02-02 RX ORDER — CODEINE PHOSPHATE AND GUAIFENESIN 10; 100 MG/5ML; MG/5ML
10 SOLUTION ORAL EVERY 4 HOURS PRN
Status: DISCONTINUED | OUTPATIENT
Start: 2022-02-02 | End: 2022-02-08 | Stop reason: HOSPADM

## 2022-02-02 RX ORDER — ALBUTEROL SULFATE 90 UG/1
1-2 AEROSOL, METERED RESPIRATORY (INHALATION)
Status: DISCONTINUED | OUTPATIENT
Start: 2022-02-02 | End: 2022-02-08 | Stop reason: HOSPADM

## 2022-02-02 RX ORDER — EPINEPHRINE 1 MG/ML
0.3 INJECTION, SOLUTION, CONCENTRATE INTRAVENOUS EVERY 5 MIN PRN
Status: DISCONTINUED | OUTPATIENT
Start: 2022-02-02 | End: 2022-02-08 | Stop reason: HOSPADM

## 2022-02-02 RX ORDER — METRONIDAZOLE 10 MG/G
GEL TOPICAL DAILY
Status: DISCONTINUED | OUTPATIENT
Start: 2022-02-02 | End: 2022-02-08 | Stop reason: HOSPADM

## 2022-02-02 RX ORDER — HYDROCORTISONE 10 MG/1
20 TABLET ORAL DAILY
Status: DISCONTINUED | OUTPATIENT
Start: 2022-02-03 | End: 2022-02-08 | Stop reason: HOSPADM

## 2022-02-02 RX ADMIN — ONDANSETRON HYDROCHLORIDE 8 MG: 8 TABLET, FILM COATED ORAL at 22:33

## 2022-02-02 RX ADMIN — FOSAPREPITANT 150 MG: 150 INJECTION, POWDER, LYOPHILIZED, FOR SOLUTION INTRAVENOUS at 22:30

## 2022-02-02 RX ADMIN — NYSTATIN 500000 UNITS: 100000 SUSPENSION ORAL at 20:14

## 2022-02-02 RX ADMIN — METRONIDAZOLE: 7.5 GEL TOPICAL at 20:14

## 2022-02-02 RX ADMIN — TRIAMCINOLONE ACETONIDE: 1 CREAM TOPICAL at 20:14

## 2022-02-02 RX ADMIN — DOXORUBICIN HYDROCHLORIDE 70 MG: 2 INJECTABLE, LIPOSOMAL INTRAVENOUS at 23:03

## 2022-02-02 RX ADMIN — ENOXAPARIN SODIUM 40 MG: 40 INJECTION SUBCUTANEOUS at 20:14

## 2022-02-02 RX ADMIN — OLANZAPINE 5 MG: 5 TABLET, FILM COATED ORAL at 22:33

## 2022-02-02 RX ADMIN — LIDOCAINE AND PRILOCAINE: 25; 25 CREAM TOPICAL at 19:22

## 2022-02-02 RX ADMIN — HYDROCORTISONE 10 MG: 10 TABLET ORAL at 18:37

## 2022-02-02 ASSESSMENT — ACTIVITIES OF DAILY LIVING (ADL)
ADLS_ACUITY_SCORE: 12
ADLS_ACUITY_SCORE: 6
DOING_ERRANDS_INDEPENDENTLY_DIFFICULTY: NO
ADLS_ACUITY_SCORE: 6
ADLS_ACUITY_SCORE: 5
ADLS_ACUITY_SCORE: 6
ADLS_ACUITY_SCORE: 5
ADLS_ACUITY_SCORE: 12

## 2022-02-02 ASSESSMENT — MIFFLIN-ST. JEOR: SCORE: 1366.34

## 2022-02-02 NOTE — H&P
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 Wolff. Patient presented to his PCP in 03/2021 with chest/left shoulder and back pain that led to imaging which revealed multiple lung mets, included a left pleural mass. There were difficulties in getting diagnostic biopsy; eventually, biopsy of the mediastinal mass (5/20/21) revealed a poorly characterized malignant spindle cell neoplasm with high PD-L1 expression (90%), Ki-67 70%. 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 Insufficient material to send [facility]. Baseline imaging (6/21/21) revealed progression of lung mets and left-sided subdiaphragmatic mass, stable liver lesions. He was seen by ENT for hoarseness, which was attributed to left true vocal fold motion impairment from mediastinal mass. Given high PD-L1 expression, he was started on Keytruda (C1=6/21/21). Interval imaging (CT 8/2/21) revealed positive response to treatment. He received 6 cycles total, most recently C6=10/4/21. Unfortunately, restaging imaging (10/18/21) revealed interval increase in size of the LUQ/splenic mass; however, the mediastinal and left pleural mass were stable to slightly decreased in size. He met with Dr. Wolff, who recommended a change in therapy to Doxil/ifosfamide. He was admitted on 10/26/21 to receive Cycle #1 of Doxil/ifos which he tolerated reasonably well with no neurotoxicities,  but did have moderate chemotherapy-induced nausea. After discharge he had significant mucositis, poor PO intake, nausea, and lyte derangements. Given adverse effects, Cycle 2 was delayed by 1 week and Doxil was dose reduced to 70 mg, or 40 mg/m2 (previously given 80 mg, or 45mg/m2), and ifosfamide was reduced by 10% (1500mg/m2 ? 1350mg/m2).   - Tolerated Cycle 2 Doxil/Ifos (D1=12/1/21) better, so plan to proceed with same dosing for cycle 3.  - Cycle 3 tolerated well with same dose, plan to proceed with same dose.    - Port placed outpatient on 1/28/22; will access on admission.   - Outpatient team plans to restage with repeat imaging after Cycle 4.                                            Treatment Plan: Doxil + ifosfamide (C3D1=1/4/22).               Doxil 70mg IV once - D1                Ifosfamide/Mesna 1350 mg/m2 (2510 mg) CIVI - D1-6                Mesna 1350 mg/m2 (2510 mg) IV over 18 hrs - starting D7                Pre-meds: Emend 150 mg once Day 1 and Day 4; Zofran 8 mg Q8H                Neulasta injection - D8, scheduled at [facility] on 1/13     # Cancer-related pain  - Continue PTA Celebrex 200 mg BID   - Continue PTA topical Bengay PRN at bedtime to left back      # Normocytic anemia  Noted intermittently. Likely related to chemo and underlying disease.   - Transfuse to maintain Hgb >7, will sign blood consent if indicated     CV  # CAD   Followed by Dr. Gigi Vernon at [facility]. He was noted to have coronary calcium on chest CT. CTA 9/29/21 with moderate mid LAD stenosis. Small to moderate caliber ramus branch with severe diffuse disease. Echo completed 9/29/21 with normal EF and no valvular dysfunction. Repeat ECHO on 10/27 with EF 60-65%, early diastolic dysfunction but otherwise no change from previous. Previously has been on rosuvastatin 40 mg daily, however this was recently held due to transaminitis  - Encourage outpatient cardiology follow-up as recommended     # H/o  "intermittent elevated blood pressures   BPs ranging from normotensive to hypertensive (with SBP 140s-160) during previous admissions. He states that he runs higher at baseline (though this is based on clinic assessments as he does not typically monitor his BP at home). Appears recent intermittent clinic BPs sit at 140s-150s/80s-90s when seen in person, otherwise many of the visits are currently virtual. He is not on any antihypertensive medications PTA. Suspect he has undiagnosed essential HTN. Will monitor closely this admission.    - Would recommend outpatient PCP follow-up for further discussion of BP monitoring and management.   - Oral hydralazine available PRN (SBP >170, DBP >100)     GI  # Chemotherapy-induced nausea  Improved C2 with the following regimen, plan to continue this admission. Per patient, nausea typically \"kicks in\" around day 3 or 4.   - Scheduled Zofran Q8H  - PRN compazine and Ativan  - Emend 150 mg IV x1 on D1; re-dosed on D4  - Zyprexa 5 mg at bedtime which has been helpful in past      # Transaminitis, resolved  First noted in late Nov: , , TBili WNL on 11/29/21. PTA statin was subsequently held. Per outpatient notes, he did not have any abdominal pain or other symptoms concerning for viral hepatitis. Improved/resolved with holding PTA statin - which has been held on admission. He also has known liver metastases which are likely contributing.   - Okay to continue to hold PTA statin  - Follow LFTs daily     # GERD  - Continue PTA omeprazole  - TUMS and Pepto Bismol available PRN      RENAL/FEN  # Poor PO intake  Receives 2x weekly IVF outpatient due to poor PO intake with chemotherapy cycles. Was notably hypervolemic with last cycle, so will carefully monitor with cycle 4. Cr 0.97 on 1/31 (baseline ~0.8), so will assess kidney function on admission and bolus PRN.   - Follow I/Os, daily weights.  - Trend daily CMP.   - No mIVF at this time; bolus PRN.      # Hypophosphatemia, " intermittent  # Hypokalemia, intermittent  Secondary to ifosfamide. Required additional phos and K replacement after recent discharge from C3 doxil/Ifos. Most recent labs on 1/31 notable for phos 2.5, K 3.5.  - Check Phos, K on admission and assess need for replacement.  - Continue PTA PO Phos and KCl   - Replete additional per standing protocol     ENDO  # Panhypopituitarism  # Macroadenoma of the pituitary gland  Diagnosed in 2010. Found to have a pituitary macroadenoma, 1.9 cm in largest dimension. Underwent hypophysectomy on 8/23/10. Subsequent presumptive pituitary deficiency, on empiric meds for adrenal insufficiency and thyroid. Previously followed by Dr. Bill Mccall; now has established care with Dr. Jamir Grant on 11/11/21.  - Continue PTA Levothyroxine  - Continue PTA Hydrocortisone 15 mg qAM and 10 mg qPM  - Continue endocrinology follow-up as scheduled (next scheduled for 5/16/22)     ENT  # Vocal cord dysfunction  Manifested as hoarseness. Seen by ENT (Dr. Kyree Bearden) on 6/21/21 and noted to have left true vocal fold motion impairment from mediastinal mass. Underwent injection of ProLaryn (filler/bulking agent) on 7/1/21, which reportedly has been minimally beneficial. Seen in follow-up by Dr. Bearden on 9/1/21 and noted to have minimal improvement; he was then referred to [facility] ([facility]).   - No acute inpatient management issues  - Continue outpatient ENT follow-up as previously scheduled (next scheduled for 3/10/22)     # Oral candidiasis  # H/o mucositis  Significant 2/2 Doxil. Noted after C1 chemo, then better following C2 dose-reduction. On admission patient reports thrush again starting a couple days prior to admission. Restarted Nystatin at home on 1/3. Initially denied any mouth sores though endorses poor taste and tongue coating. On admission, has recently started treatment for oral thrush in the past couple days.   - Continue salt/soda swishes  - Continue Nystatin QID  with this cycle  - MMW, Doxepin available PRN      DERM  # History of palmar-plantar erythrodysesthesia (Hand-Foot Syndrome)  #New itching rash on bilateral hips mostly excoriations at this time  Due to Doxil. Noted following C2.  - Iced hands and mouth during Doxil infusion, tolerated well   - Will continue this practice with subsequent cycles  -Vaseline on rash on hips  -Derm consult for the a.m.     # Rosacea  - Continue PTA metronidazole gel     PSYCH  # History of anxiety/depression  Situational, related to malaise/illness. Previously placed health psych referral.  - No acute inpatient management needs  -Patient requesting to talk to the day team about possibly initiating something     FEN:  - Encouraged PO fluids, bolus PRN  - PRN lyte replacement  - RDAT    Urgency with urination  - check UA     Prophy/Misc:  - VTE: ppx Lovenox 40mg daily during admission   - GI/PUD: PTA omeprazole, Tums PRN  - Bowels: Senna and Miralax PRN  - Activity: Consider consulting PT if indicated  - Dry eyes - refresh eye drops     Code: FULL  Disposition: Admitted to Oncology Team 3 service for scheduled chemotherapy 7-day stay. Anticipate discharge ~2/10 pending chemo timing and barring complications.    Follow-up: Follows with Dr. Wolff   - Twice weekly labs/infusion appts are scheduled at [facility] follow-up visit is scheduled on 2/14  - Will request add on Neulasta to infusion added to already-scheduled infusion     Patient seen and discussed with attending physician, Dr. Martinez.      I spent 45 minutes in the care of this patient today, which included time necessary for review of interval events, obtaining history and physical exam, ordering medications/tests/procedures as medically indicated, review of pertinent medical literature, counseling of the patient, coordination of care, and documentation time. Over 50% of time was spent counseling the patient and/or coordinating care.    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    On admission, Ifrah states that he never really bounced back from his previous chemotherapy means continuing just feel awful since his last cycle stopped.  He has had some nausea but no real vomiting the last few days.  No pain anywhere specifically just significant fatigue.  He has been sleeping a lot.  He worries if he may have some depression.  He also notes that he has urgency with urination that is new.  No burning or pain when he pees.  No bowel concerns at this point time no abdominal pain no cough no shortness of breath no chest pain no dizziness no fevers.  But he does endorse intermittent headaches.  He does state his edema has improved.  No vision or hearing changes    Clinic note 1/26 - Ifrah was called today for follow-up. He overall is doing well. He does note low energy. His mouth sores are healed and no issues with thrush. Still has poor taste but is eating and drinking OK. Managing nausea with Zofran during the day and Zyprexa at night. Notes mild brain fog-had trouble doing division this AM. No other neuro concerns and no new neuropathy (has baseline tingling in feet that is unchanged).      .Past Medical History    I have reviewed this patient's medical history and updated it with pertinent information if needed.   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  Past Surgical History:   Procedure Laterality Date     COLONOSCOPY N/A 2020    Procedure: COLONOSCOPY;  Surgeon: Ken Camacho MD;  Location: [facility]     ENT SURGERY       HERNIA REPAIR       INSERT PORT VASCULAR ACCESS Right 2022    Procedure: INSERTION, VASCULAR ACCESS PORT;  Surgeon: Daniel Kinney MD;  [place] [facility] [place] [facility]     IR CHEST PORT PLACEMENT > 5 YRS OF AGE  2022     LARYNGOSCOPY, EXCISE VOCAL CORD LESION MICROSCOPIC, COMBINED Left 2021    Procedure: MICROLARYNGOSCOPY, LEFT TRUE VOCAL CORD INJECTION WITH PROLARYN;  Surgeon: Kyree Bearden MD;  Location: [facility]     PHACOEMULSIFICATION WITH STANDARD INTRAOCULAR LENS IMPLANT Right 03/10/2021    Procedure: Cataract removal with implant.;  Surgeon: Jamir Mac MD;  Location: [facility]     PHACOEMULSIFICATION WITH STANDARD INTRAOCULAR LENS IMPLANT Left 2021    Procedure: Cataract removal with implant.;  Surgeon: Jamir Mac MD;  Location: [facility]     PICC DOUBLE LUMEN PLACEMENT Right 2021    5FR DL PICC, basilic vein. L-38cm, 1cm out.     PICC DOUBLE LUMEN PLACEMENT Right 2022    Right cephalic, 41 cm, 1 external length     PITUITARY EXCISION       tooth pulled 4/7  Right        Prior to Admission Medications   Prior to Admission Medications   Prescriptions Last Dose Informant Patient Reported? Taking?   Insulin Syringe-Needle U-100 27.5G X 5/8\" 2 ML MISC   No No   Si each daily as needed (with solucortef for adrenal crisis)   LORazepam (ATIVAN) 0.5 MG tablet Unknown at Unknown time  No Yes   Sig: Take 1 tablet (0.5 mg) by mouth every 4 hours as needed for nausea or vomiting . Caution: causes sedation.   Menthol-Methyl Salicylate (DALIA MALIK GREASELESS) cream More than a month at Unknown time  Yes Yes   Sig: Apply topically every 6 hours as needed   OLANZapine (ZYPREXA) 5 MG tablet 2022 at Unknown time  No Yes   Sig: Take 1 " tablet (5 mg) by mouth At Bedtime Continue until your nausea resolves   SUMAtriptan (IMITREX) 50 MG tablet More than a month at Unknown time  No Yes   Sig: Take 1 tablet (50 mg) by mouth at onset of headache for migraine May repeat in 2 hours. Max 4 tablets/24 hours.   acetaminophen (TYLENOL) 500 MG tablet 2/1/2022 at Unknown time  Yes Yes   Sig: Take 500-1,000 mg by mouth every 6 hours as needed for mild pain   calcium carbonate (TUMS) 500 MG chewable tablet Past Month at Unknown time  Yes Yes   Sig: Take 1 tablet (500 mg) by mouth 3 times daily as needed for heartburn   celecoxib (CELEBREX) 200 MG capsule Past Week at Unknown time  No Yes   Sig: Take 1 capsule (200 mg) by mouth 2 times daily. Note this is a new a strength! Only one capsule at a time!   cholecalciferol (VITAMIN D-1000 MAX ST) 1000 units TABS 2/2/2022 at Unknown time Self Yes Yes   Sig: Take 1,000 Units by mouth daily    doxepin (SINEQUAN) 10 MG/ML (HIGH CONC) solution More than a month at Unknown time  No Yes   Sig: Take 2 mLs (20 mg) by mouth every 4 hours as needed for other (mouth pain) . Mix with equal parts tap water. Swish and hold in mouth ~60 seconds then spit out.   guaiFENesin-codeine (GUAIFENESIN AC) 100-10 MG/5ML syrup 2/1/2022 at Unknown time  No Yes   Sig: Take 10 mLs by mouth every 4 hours as needed for cough   hydrocortisone (CORTEF) 10 MG tablet 2/2/2022 at Unknown time  No Yes   Sig: Take 20 mg in the morning and 10 mg in the afternoon   levothyroxine (SYNTHROID/LEVOTHROID) 75 MCG tablet 2/2/2022 at Unknown time  No Yes   Sig: Take 1 tablet (75 mcg) by mouth every morning   metroNIDAZOLE (METROGEL) 0.75 % external gel 2/2/2022 at Unknown time  No Yes   Sig: Apply topically 2 times daily   metroNIDAZOLE (METROGEL) 1 % external gel 2/2/2022 at Unknown time  No Yes   Sig: Apply topically daily .Try stronger dose due to increased symptoms after chemo.   nystatin (MYCOSTATIN) 548105 UNIT/ML suspension 2/2/2022 at Unknown time  Yes Yes    Sig: Take 5 mLs (500,000 Units) by mouth 4 times daily Hold while taking Fluconazole, could resume after Fluconazole if you have ongoing coating on tongue.   omeprazole (PRILOSEC) 20 MG DR capsule 2/2/2022 at Unknown time  Yes Yes   Sig: Take 2 capsules (40 mg) by mouth daily   ondansetron (ZOFRAN) 4 MG tablet 2/1/2022 at Unknown time  No Yes   Sig: Take 1-2 tablets (4-8 mg) by mouth every 8 hours as needed for nausea   phosphorus tablet 250 mg (PHOSPHA 250 NEUTRAL) 250 MG per tablet 2/2/2022 at Unknown time  Yes Yes   Sig: Take 2 tablets (500 mg) by mouth daily   potassium chloride ER (K-TAB) 20 MEQ CR tablet 2/2/2022 at Unknown time  Yes Yes   Sig: Take 1 tablet (20 mEq) by mouth daily   prochlorperazine (COMPAZINE) 5 MG tablet Past Week at Unknown time  No Yes   Sig: Take 1 tablet (5 mg) by mouth every 6 hours as needed for nausea or vomiting . Caution: causes sedation.   triamcinolone (KENALOG) 0.1 % external cream 2/2/2022 at Unknown time Self Yes Yes   Sig: Apply topically 2 times daily       Facility-Administered Medications: None     Allergies   Allergies   Allergen Reactions     Penicillins Hives and Swelling              Social History   I have reviewed this patient's social history and updated it with pertinent information if needed. Ifrah Huitron  reports that he has never smoked. He has never used smokeless tobacco. He reports current alcohol use. He reports that he does not use drugs.    Family History   I have reviewed this patient's family history and updated it with pertinent information if needed.   Family History   Problem Relation Age of Onset     Lupus Mother      ALS Father      Rheumatoid Arthritis Sister        Review of Systems   A complete Review of Systems is negative other than noted in the HPI or here.     Physical Exam   Temp: 97.4  F (36.3  C) Temp src: Oral BP: (!) 151/83 Pulse: 80   Resp: 18 SpO2: 100 % O2 Device: None (Room air)    Vital Signs with Ranges  Temp:  [96.9  F (36.1   C)-97.4  F (36.3  C)] 97.4  F (36.3  C)  Pulse:  [80-95] 80  Resp:  [18] 18  BP: (151-157)/(83-92) 151/83  SpO2:  [98 %-100 %] 100 %  146 lbs 1.6 oz  Constitutional: Pleasant gentleman seen sitting up in a chair. No apparent distress, and appears stated age.  Eyes: Lids and lashes normal, sclera clear, conjunctiva normal.  ENT: Normocephalic, without obvious abnormality, atraumatic, oral pharynx with moist mucus membranes, tonsils without erythema or exudates, gums normal and good dentition.   Respiratory: No increased work of breathing, good air exchange, clear to auscultation bilaterally, no crackles or wheezing.  Cardiovascular: Regular rate and rhythm, normal S1 and S2, and no murmur noted.  GI: No masses or scars. +BS. Soft. No tenderness on palpation.  Skin: No bruising or bleeding appreciated. Normal skin color, texture and turgor without redness, warmth, or swelling. No jaundice.  Lateral hips he has excoriated red lines.  Extremities: There is no redness, warmth, or swelling of the joints. No lower extremity edema. No cyanosis.  Neurologic: Awake, alert, oriented to name, place and time.    Vascular access: Port    Data   Results for orders placed or performed during the hospital encounter of 02/02/22 (from the past 24 hour(s))   CBC with platelets differential    Narrative    The following orders were created for panel order CBC with platelets differential.  Procedure                               Abnormality         Status                     ---------                               -----------         ------                     CBC with platelets and d...[[id number]]  Abnormal            Final result                 Please view results for these tests on the individual orders.   Comprehensive metabolic panel   Result Value Ref Range    Sodium 141 133 - 144 mmol/L    Potassium 4.1 3.4 - 5.3 mmol/L    Chloride 108 94 - 109 mmol/L    Carbon Dioxide (CO2) 29 20 - 32 mmol/L    Anion Gap 4 3 - 14 mmol/L    Urea  Nitrogen 19 7 - 30 mg/dL    Creatinine 1.08 0.66 - 1.25 mg/dL    Calcium 9.2 8.5 - 10.1 mg/dL    Glucose 114 (H) 70 - 99 mg/dL    Alkaline Phosphatase 204 (H) 40 - 150 U/L    AST 40 0 - 45 U/L    ALT 48 0 - 70 U/L    Protein Total 7.1 6.8 - 8.8 g/dL    Albumin 3.2 (L) 3.4 - 5.0 g/dL    Bilirubin Total 1.3 0.2 - 1.3 mg/dL    GFR Estimate 72 >60 mL/min/1.73m2   CBC with platelets and differential   Result Value Ref Range    WBC Count 10.3 4.0 - 11.0 10e3/uL    RBC Count 3.19 (L) 4.40 - 5.90 10e6/uL    Hemoglobin 9.3 (L) 13.3 - 17.7 g/dL    Hematocrit 31.4 (L) 40.0 - 53.0 %    MCV 98 78 - 100 fL    MCH 29.2 26.5 - 33.0 pg    MCHC 29.6 (L) 31.5 - 36.5 g/dL    RDW 18.6 (H) 10.0 - 15.0 %    Platelet Count 275 150 - 450 10e3/uL    % Neutrophils 71 %    % Lymphocytes 12 %    % Monocytes 13 %    % Eosinophils 0 %    % Basophils 1 %    % Immature Granulocytes 3 %    NRBCs per 100 WBC 0 <1 /100    Absolute Neutrophils 7.3 1.6 - 8.3 10e3/uL    Absolute Lymphocytes 1.2 0.8 - 5.3 10e3/uL    Absolute Monocytes 1.4 (H) 0.0 - 1.3 10e3/uL    Absolute Eosinophils 0.0 0.0 - 0.7 10e3/uL    Absolute Basophils 0.1 0.0 - 0.2 10e3/uL    Absolute Immature Granulocytes 0.4 <=0.4 10e3/uL    Absolute NRBCs 0.0 10e3/uL   WBC and differential    Narrative    The following orders were created for panel order WBC and differential.  Procedure                               Abnormality         Status                     ---------                               -----------         ------                     WBC and Differential[[id number]]         Abnormal            Final result                 Please view results for these tests on the individual orders.   WBC and Differential   Result Value Ref Range    WBC Count 10.3 4.0 - 11.0 10e3/uL    % Neutrophils 71 %    % Lymphocytes 12 %    % Monocytes 13 %    % Eosinophils 0 %    % Basophils 1 %    % Immature Granulocytes 3 %    NRBCs per 100 WBC 0 <1 /100    Absolute Neutrophils 7.3 1.6 - 8.3 10e3/uL     Absolute Lymphocytes 1.2 0.8 - 5.3 10e3/uL    Absolute Monocytes 1.4 (H) 0.0 - 1.3 10e3/uL    Absolute Eosinophils 0.0 0.0 - 0.7 10e3/uL    Absolute Basophils 0.1 0.0 - 0.2 10e3/uL    Absolute Immature Granulocytes 0.4 <=0.4 10e3/uL    Absolute NRBCs 0.0 10e3/uL   Magnesium   Result Value Ref Range    Magnesium 2.5 (H) 1.6 - 2.3 mg/dL   Phosphorus   Result Value Ref Range    Phosphorus 2.8 2.5 - 4.5 mg/dL   Asymptomatic COVID-19 Virus (Coronavirus) by PCR Nasopharyngeal    Specimen: Nasopharyngeal; Swab   Result Value Ref Range    SARS CoV2 PCR Negative Negative, Testing sent to reference lab. Results will be returned via unsolicited result    Narrative    Testing was performed using the Xpert Xpress SARS-CoV-2 Assay on the  Cepheid Gene-Xpert Instrument Systems. Additional information about  this Emergency Use Authorization (EUA) assay can be found via the Lab  Guide. This test should be ordered for the detection of SARS-CoV-2 in  individuals who meet SARS-CoV-2 clinical and/or epidemiological  criteria. Test performance is unknown in asymptomatic patients. This  test is for in vitro diagnostic use under the FDA EUA for  laboratories certified under CLIA to perform high complexity testing.  This test has not been FDA cleared or approved. A negative result  does not rule out the presence of PCR inhibitors in the specimen or  target RNA in concentration below the limit of detection for the  assay. The possibility of a false negative should be considered if  the patient's recent exposure or clinical presentation suggests  COVID-19. This test was validated by the [facility]. This laboratory is certified under  the Clinical Laboratory Improvement Amendments of 1988 (CLIA-88) as  qualified to perform high complexity laboratory testing.

## 2022-02-03 ENCOUNTER — PATIENT OUTREACH (OUTPATIENT)
Dept: CARE COORDINATION | Facility: CLINIC | Age: 72
End: 2022-02-03
Payer: MEDICARE

## 2022-02-03 DIAGNOSIS — C49.9 SARCOMA (H): Primary | ICD-10-CM

## 2022-02-03 LAB
ALBUMIN SERPL-MCNC: 2.8 G/DL (ref 3.4–5)
ALP SERPL-CCNC: 177 U/L (ref 40–150)
ALT SERPL W P-5'-P-CCNC: 38 U/L (ref 0–70)
ANION GAP SERPL CALCULATED.3IONS-SCNC: 5 MMOL/L (ref 3–14)
AST SERPL W P-5'-P-CCNC: 28 U/L (ref 0–45)
BASOPHILS # BLD AUTO: 0.1 10E3/UL (ref 0–0.2)
BASOPHILS NFR BLD AUTO: 1 %
BILIRUB SERPL-MCNC: 0.4 MG/DL (ref 0.2–1.3)
BUN SERPL-MCNC: 17 MG/DL (ref 7–30)
CALCIUM SERPL-MCNC: 8.6 MG/DL (ref 8.5–10.1)
CHLORIDE BLD-SCNC: 107 MMOL/L (ref 94–109)
CO2 SERPL-SCNC: 29 MMOL/L (ref 20–32)
CREAT SERPL-MCNC: 0.95 MG/DL (ref 0.66–1.25)
EOSINOPHIL # BLD AUTO: 0 10E3/UL (ref 0–0.7)
EOSINOPHIL NFR BLD AUTO: 0 %
ERYTHROCYTE [DISTWIDTH] IN BLOOD BY AUTOMATED COUNT: 18.4 % (ref 10–15)
GFR SERPL CREATININE-BSD FRML MDRD: 85 ML/MIN/1.73M2
GLUCOSE BLD-MCNC: 83 MG/DL (ref 70–99)
HCT VFR BLD AUTO: 28.3 % (ref 40–53)
HGB BLD-MCNC: 8.7 G/DL (ref 13.3–17.7)
IMM GRANULOCYTES # BLD: 0.2 10E3/UL
IMM GRANULOCYTES NFR BLD: 3 %
LYMPHOCYTES # BLD AUTO: 1.1 10E3/UL (ref 0.8–5.3)
LYMPHOCYTES NFR BLD AUTO: 15 %
MAGNESIUM SERPL-MCNC: 2.3 MG/DL (ref 1.6–2.3)
MCH RBC QN AUTO: 30.3 PG (ref 26.5–33)
MCHC RBC AUTO-ENTMCNC: 30.7 G/DL (ref 31.5–36.5)
MCV RBC AUTO: 99 FL (ref 78–100)
MONOCYTES # BLD AUTO: 1.1 10E3/UL (ref 0–1.3)
MONOCYTES NFR BLD AUTO: 15 %
NEUTROPHILS # BLD AUTO: 5.2 10E3/UL (ref 1.6–8.3)
NEUTROPHILS NFR BLD AUTO: 66 %
NRBC # BLD AUTO: 0 10E3/UL
NRBC BLD AUTO-RTO: 0 /100
PHOSPHATE SERPL-MCNC: 2.8 MG/DL (ref 2.5–4.5)
PLATELET # BLD AUTO: 229 10E3/UL (ref 150–450)
POTASSIUM BLD-SCNC: 3.9 MMOL/L (ref 3.4–5.3)
PROT SERPL-MCNC: 6.2 G/DL (ref 6.8–8.8)
RBC # BLD AUTO: 2.87 10E6/UL (ref 4.4–5.9)
SODIUM SERPL-SCNC: 141 MMOL/L (ref 133–144)
WBC # BLD AUTO: 7.8 10E3/UL (ref 4–11)

## 2022-02-03 PROCEDURE — 99221 1ST HOSP IP/OBS SF/LOW 40: CPT | Mod: GC | Performed by: DERMATOLOGY

## 2022-02-03 PROCEDURE — 99233 SBSQ HOSP IP/OBS HIGH 50: CPT | Mod: FS | Performed by: STUDENT IN AN ORGANIZED HEALTH CARE EDUCATION/TRAINING PROGRAM

## 2022-02-03 PROCEDURE — 250N000011 HC RX IP 250 OP 636: Performed by: PHYSICIAN ASSISTANT

## 2022-02-03 PROCEDURE — 83735 ASSAY OF MAGNESIUM: CPT | Performed by: PHYSICIAN ASSISTANT

## 2022-02-03 PROCEDURE — 258N000003 HC RX IP 258 OP 636: Performed by: PHYSICIAN ASSISTANT

## 2022-02-03 PROCEDURE — 84100 ASSAY OF PHOSPHORUS: CPT | Performed by: PHYSICIAN ASSISTANT

## 2022-02-03 PROCEDURE — 250N000013 HC RX MED GY IP 250 OP 250 PS 637: Performed by: PHYSICIAN ASSISTANT

## 2022-02-03 PROCEDURE — 36591 DRAW BLOOD OFF VENOUS DEVICE: CPT | Performed by: PHYSICIAN ASSISTANT

## 2022-02-03 PROCEDURE — 85025 COMPLETE CBC W/AUTO DIFF WBC: CPT | Performed by: PHYSICIAN ASSISTANT

## 2022-02-03 PROCEDURE — 80053 COMPREHEN METABOLIC PANEL: CPT | Performed by: PHYSICIAN ASSISTANT

## 2022-02-03 PROCEDURE — 120N000002 HC R&B MED SURG/OB UMMC

## 2022-02-03 RX ORDER — CELECOXIB 200 MG/1
200 CAPSULE ORAL 2 TIMES DAILY PRN
Status: DISCONTINUED | OUTPATIENT
Start: 2022-02-03 | End: 2022-02-08 | Stop reason: HOSPADM

## 2022-02-03 RX ADMIN — OLANZAPINE 5 MG: 5 TABLET, FILM COATED ORAL at 22:56

## 2022-02-03 RX ADMIN — PROCHLORPERAZINE MALEATE 5 MG: 5 TABLET ORAL at 08:27

## 2022-02-03 RX ADMIN — ONDANSETRON HYDROCHLORIDE 4 MG: 8 TABLET, FILM COATED ORAL at 18:51

## 2022-02-03 RX ADMIN — NYSTATIN 500000 UNITS: 100000 SUSPENSION ORAL at 13:29

## 2022-02-03 RX ADMIN — HYDROCORTISONE 10 MG: 10 TABLET ORAL at 13:29

## 2022-02-03 RX ADMIN — NYSTATIN 500000 UNITS: 100000 SUSPENSION ORAL at 01:17

## 2022-02-03 RX ADMIN — NYSTATIN 500000 UNITS: 100000 SUSPENSION ORAL at 08:13

## 2022-02-03 RX ADMIN — HYDROCORTISONE 20 MG: 10 TABLET ORAL at 08:15

## 2022-02-03 RX ADMIN — NYSTATIN 500000 UNITS: 100000 SUSPENSION ORAL at 16:50

## 2022-02-03 RX ADMIN — ENOXAPARIN SODIUM 40 MG: 40 INJECTION SUBCUTANEOUS at 20:15

## 2022-02-03 RX ADMIN — ONDANSETRON HYDROCHLORIDE 8 MG: 8 TABLET, FILM COATED ORAL at 13:29

## 2022-02-03 RX ADMIN — TRIAMCINOLONE ACETONIDE: 1 CREAM TOPICAL at 20:15

## 2022-02-03 RX ADMIN — MESNA 2510 MG: 100 INJECTION, SOLUTION INTRAVENOUS at 00:31

## 2022-02-03 RX ADMIN — ONDANSETRON HYDROCHLORIDE 8 MG: 8 TABLET, FILM COATED ORAL at 22:57

## 2022-02-03 RX ADMIN — ONDANSETRON HYDROCHLORIDE 8 MG: 8 TABLET, FILM COATED ORAL at 05:13

## 2022-02-03 RX ADMIN — PANTOPRAZOLE SODIUM 40 MG: 40 TABLET, DELAYED RELEASE ORAL at 08:13

## 2022-02-03 RX ADMIN — TRIAMCINOLONE ACETONIDE: 1 CREAM TOPICAL at 08:15

## 2022-02-03 RX ADMIN — LEVOTHYROXINE SODIUM 75 MCG: 0.05 TABLET ORAL at 07:06

## 2022-02-03 RX ADMIN — Medication 25 MCG: at 08:15

## 2022-02-03 RX ADMIN — METRONIDAZOLE: 7.5 GEL TOPICAL at 20:16

## 2022-02-03 RX ADMIN — METRONIDAZOLE: 7.5 GEL TOPICAL at 08:16

## 2022-02-03 RX ADMIN — ACETAMINOPHEN 1000 MG: 500 TABLET ORAL at 18:51

## 2022-02-03 RX ADMIN — NYSTATIN 500000 UNITS: 100000 SUSPENSION ORAL at 20:15

## 2022-02-03 ASSESSMENT — ACTIVITIES OF DAILY LIVING (ADL)
ADLS_ACUITY_SCORE: 5
DEPENDENT_IADLS:: INDEPENDENT
ADLS_ACUITY_SCORE: 5

## 2022-02-03 ASSESSMENT — MIFFLIN-ST. JEOR: SCORE: 1359.53

## 2022-02-03 NOTE — PLAN OF CARE
Alert and oriented X 4. Intermittently hypertensive. OVSS. Denies SOB, pain, nausea or abdominal discomfort. Denies headache, chest pain or palpitations. He reports that he gets hypertensive with chemotherapy and IV fluids. Day #1 Ifosfamide/Mesna infusing. Tolerating well with no s/sx of neurotoxicity or hematuria. Voiding spontaneously. Sleeping in between cares.

## 2022-02-03 NOTE — PROGRESS NOTES
Nursing Focus: Chemotherapy    D: Positive blood return via PICC. Insertion site is clean/dry/intact, dressing intact with no complaints of pain.  Urine output is recorded in intake in Doc Flowsheet.      I: Premedications given per order (see electronic medical administration record). Dose #1 of Doxil started to infuse over 1hours. Reviewed pt teaching on chemotherapy side effects.  Pt denies need for further teaching. Chemotherapy double checked per protocol by two chemotherapy competent RN's.     A: Tolerating procedure well. Denies nausea and or pain.     P: Continue to monitor urine output and symptoms of nausea. Screen for symptoms of toxicity.

## 2022-02-03 NOTE — CONSULTS
"CLINICAL NUTRITION SERVICES - ASSESSMENT NOTE     Nutrition Prescription    RECOMMENDATIONS FOR MDs/PROVIDERS TO ORDER:  none    Malnutrition Status:     Moderate malnutrition in the context of chronic illness    Recommendations already ordered by Registered Dietitian (RD):  None at this time    Future/Additional Recommendations:  - Monitor wt trends  - Monitor PO intake and and need for calorie count monitoring if wt loss noted     REASON FOR ASSESSMENT  Ifrah Huitron is a/an 71 year old male assessed by the dietitian for Admission Nutrition Risk Screen for positive (unsure of wt loss and decreased appetite)    Metastatic spindle cell sarcoma, anemia, CAD, elevated blood pressure, chemo induced nausea, mucositis, GERD, hypokalemia, hypophosphatemia,     NUTRITION HISTORY  Receives 2x weekly IVF outpatient due to poor PO intake PTA with chemotherapy cycles  poor taste and decreased appetite at home d/t chemo    CURRENT NUTRITION ORDERS  Diet: Regular  Intake/Tolerance: 100%, ordering adequate meals. Pt reports decreased appetite, but eating well    LABS   Labs reviewed    MEDICATIONS  Medications reviewed  Zofran, vit D3, continuous chemotherapy    ANTHROPOMETRICS  Height: 170.2 cm (5' 7\")  Most Recent Weight: 65.6 kg (144 lb 9.6 oz)  2/3/22  IBW: 67 kg  BMI: Normal BMI  Weight History:   Wt Readings from Last 10 Encounters:   02/03/22 65.6 kg (144 lb 9.6 oz)   01/28/22 65.8 kg (145 lb)   01/11/22 65.1 kg (143 lb 8 oz)   12/09/21 66.7 kg (147 lb)   12/09/21 67 kg (147 lb 11.2 oz)   12/08/21 64.8 kg (142 lb 13.7 oz)   11/12/21 68 kg (150 lb)   11/11/21 69.9 kg (154 lb)   11/04/21 70 kg (154 lb 6.4 oz)   11/02/21 85.9 kg (189 lb 6.4 oz)   6% wt loss in 3 months  Dosing Weight: 67 kg IBW    ASSESSED NUTRITION NEEDS  Estimated Energy Needs: 1824-4093 kcals/day (25 - 30 kcals/kg)  Justification: Maintenance  Estimated Protein Needs: 67-80 grams protein/day (1 - 1.2 grams of pro/kg)  Justification: Maintenance  Estimated " Fluid Needs: 7281-0623 mL/day (1 mL/kcal)   Justification: Maintenance    PHYSICAL FINDINGS  See malnutrition section below.  No abnormal nutrition-related physical findings observed.     MALNUTRITION  % Intake: < 75% for > 7 days (moderate)  % Weight Loss: 7.5% in 3 months (moderate)  Subcutaneous Fat Loss: Facial region: moderate  Muscle Loss: Unable to observe - clothing covering  Fluid Accumulation/Edema: None noted  Malnutrition Diagnosis: Moderate malnutrition in the context of chronic illness    NUTRITION DIAGNOSIS  Inadequate oral intake related to decreased appetite and taste changes as evidenced by pt consuming  < 75% for > 7 days and wt loss of 6% in 3 months.    INTERVENTIONS  Implementation  Nutrition Education: Provided education on high kcal foods and ordering adequate meals   Offered supplement, but pt declined    Goals  Patient to consume % of nutritionally adequate meal trays TID, or the equivalent with supplements/snacks.     Monitoring/Evaluation  Progress toward goals will be monitored and evaluated per protocol.    Malini Kohler  Dietetic Intern    RD has read and agrees with above findings    Eloise Cuadra RD,LD  7D pager [phone]

## 2022-02-03 NOTE — PROGRESS NOTES
Nursing Focus: Admission  D: Arrived at 1600 from home. Patient accompanied by wife. Admitted for Cycle 4 Doxil/Ifos (C4D1=2/2/2022). Complains of intermittent lower back pain and port tenderness.      I: Admission process began.  Patient oriented to room, enviroment, call light.  Md. notified of patients arrival on unit.     A: Vital signs stable, afebrile.  Patient stable at this time.  Complaining of intermittent lower back pain and port tenderness.     P: Implement plan of care when available. Continue to monitor patient. Nursing interventions as appropriate. Notify md with changes in pt status.

## 2022-02-03 NOTE — PROVIDER NOTIFICATION
DATE/TIME  (DOT-TD, DOT-NOW) CHEMO CHECK ACTIVITY (REGIMEN & DOSE CHECK, DAY, DOSE #, NAME OF CHEMO #1)  CHEMO DRUG #2  CHEMO DRUG #3 NAME OF RN #1 (USE DOT-ME HERE) NAME OF RN#2 (2ND RN TO LOG IN SEPARATELY)   2/2/2022  7:22 PM   Ifosfamide/mesna     Regimen check Doxil  Pricila Velazco, PETER Nieves RN     2/2/2022  10:52 PM   Doxil double check    PETER Valenzuela RN      2/3/2022  12:01 AM Ifosfamide/Mesna   PETER Chao RN     2/4/2022  12:26 AM Ifosfamide/ Mesna   YAMILET WALLACE RN   (Jim)   02/04/22  9:37 PM       Ifosfamide/Mesna   PETER Silva RN     02/05/22  10:12 PM    Dose #4 ifos/mesna double check   PETER Mccabe RN     02/06/22  11:27 PM    Dose #5 ifos/mesna double check   PETER Kenney

## 2022-02-03 NOTE — PROGRESS NOTES
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     Today:  - D2 Doxil/Ifos; tolerating well. Tentative discharge 2/9 barring any complications.  - Health psychology consult for ongoing support.   - Continue nystatin for thrush.    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 Wolff. Patient presented to his PCP in 03/2021 with chest/left shoulder and back pain that led to imaging which revealed multiple lung mets, included a left pleural mass. There were difficulties in getting diagnostic biopsy; eventually, biopsy of the mediastinal mass (5/20/21) revealed a poorly characterized malignant spindle cell neoplasm with high PD-L1 expression (90%), Ki-67 70%. 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 Insufficient material to send [facility]. Baseline imaging (6/21/21) revealed progression of lung mets and left-sided subdiaphragmatic mass, stable liver lesions. He was seen by ENT for hoarseness, which was attributed to left true vocal fold motion impairment from mediastinal mass. Given high PD-L1 expression, he was started on Keytruda (C1=6/21/21). Interval imaging (CT 8/2/21) revealed positive response to treatment. He received 6 cycles total, most recently C6=10/4/21. Unfortunately, restaging imaging (10/18/21) revealed interval increase in size of the LUQ/splenic mass; however, the mediastinal and left pleural mass were stable to slightly decreased in size. He met with Dr. Wolff, who recommended a change in therapy to  Doxil/ifosfamide. He was admitted on 10/26/21 to receive Cycle #1 of Doxil/ifos which he tolerated reasonably well with no neurotoxicities, but did have moderate chemotherapy-induced nausea. After discharge he had significant mucositis, poor PO intake, nausea, and lyte derangements. Given adverse effects, Cycle 2 was delayed by 1 week and Doxil was dose reduced to 70 mg, or 40 mg/m2 (previously given 80 mg, or 45mg/m2), and ifosfamide was reduced by 10% (1500mg/m2 ? 1350mg/m2).   - Tolerated Cycle 2 Doxil/Ifos (D1=12/1/21) better, so plan to proceed with same dosing for cycle 3.  - Cycle 3 tolerated well with same dose, plan to proceed with same dose.    - Port placed outpatient on 1/28/22; will access on admission.   - Outpatient team plans to restage with repeat imaging after Cycle 4.                                            Treatment Plan: Doxil + ifosfamide (C4D1=2/2/22).               Doxil 70mg IV once - D1                Ifosfamide/[place] 1350 mg/m2 (2510 mg) CIVI - D1-6                Mesna 1350 mg/m2 (2510 mg) IV over 18 hrs - starting D7                Pre-meds: Emend 150 mg once Day 1 and Day 4; Zofran 8 mg Q8H                Neulasta injection - D8 - will request @ [facility] on 1/13     # Cancer-related pain  Per patient, pain is much improved fromContinue PTA Celebrex 200 mg BID   - Continue PTA topical Bengay PRN at bedtime to left back      # Normocytic anemia  Noted intermittently. Likely related to chemo and underlying disease.   - Transfuse to maintain Hgb >7, will sign blood consent if indicated     CV  # CAD   Followed by Dr. Gigi Vernon at [facility]. He was noted to have coronary calcium on chest CT. CTA 9/29/21 with moderate mid LAD stenosis. Small to moderate caliber ramus branch with severe diffuse disease. Echo completed 9/29/21 with normal EF and no valvular dysfunction. Repeat ECHO on 10/27 with EF 60-65%, early diastolic dysfunction but otherwise no change from previous.  "Previously has been on rosuvastatin 40 mg daily, however this was recently held due to transaminitis  - Encourage outpatient cardiology follow-up as recommended     # H/o intermittent elevated blood pressures   BPs ranging from normotensive to hypertensive (with SBP 140s-160) during previous admissions. He states that he runs higher at baseline (though this is based on clinic assessments as he does not typically monitor his BP at home). Appears recent intermittent clinic BPs sit at 140s-150s/80s-90s when seen in person, otherwise many of the visits are currently virtual. He is not on any antihypertensive medications PTA. Suspect he has undiagnosed essential HTN. Will monitor closely this admission.    - Would recommend outpatient PCP follow-up for further discussion of BP monitoring and management.   - Oral hydralazine available PRN (SBP >170, DBP >100)     GI  # Chemotherapy-induced nausea  Improved C2 with the following regimen, plan to continue this admission. Per patient, nausea typically \"kicks in\" around day 3 or 4.   - Scheduled Zofran Q8H  - PRN compazine and Ativan  - Emend 150 mg IV x1 on D1; re-dosed on D4  - Zyprexa 5 mg at bedtime which has been helpful in past      # Transaminitis, resolved  First noted in late Nov: , , TBili WNL on 11/29/21. PTA statin was subsequently held. Per outpatient notes, he did not have any abdominal pain or other symptoms concerning for viral hepatitis. Improved/resolved with holding PTA statin - which has been held on admission. He also has known liver metastases which are likely contributing.   - Okay to continue to hold PTA statin  - Follow LFTs daily     # GERD  - Continue PTA omeprazole  - TUMS and Pepto Bismol available PRN      RENAL/FEN  # Poor PO intake  Receives 2x weekly IVF outpatient due to poor PO intake with chemotherapy cycles. Was notably hypervolemic with last cycle, so will carefully monitor with cycle 4. Cr 0.97 on 1/31 (baseline ~0.8), so " will assess kidney function on admission and bolus PRN.   - Follow I/Os, daily weights.  - Trend daily CMP.   - No mIVF at this time; bolus PRN.      # Hypophosphatemia, intermittent  # Hypokalemia, intermittent  Secondary to ifosfamide. Required additional phos and K replacement after recent discharge from C3 doxil/Ifos. Most recent labs on 1/31 notable for phos 2.5, K 3.5.  - Check Phos, K on admission and assess need for replacement.  - Continue PTA PO Phos and KCl   - Replete additional per standing protocol     ENDO  # Panhypopituitarism  # Macroadenoma of the pituitary gland  Diagnosed in 2010. Found to have a pituitary macroadenoma, 1.9 cm in largest dimension. Underwent hypophysectomy on 8/23/10. Subsequent presumptive pituitary deficiency, on empiric meds for adrenal insufficiency and thyroid. Previously followed by Dr. Bill Mccall; now has established care with Dr. Jamir Grant on 11/11/21.  - Continue PTA Levothyroxine  - Continue PTA Hydrocortisone 15 mg qAM and 10 mg qPM  - Continue endocrinology follow-up as scheduled (next scheduled for 5/16/22)     ENT  # Vocal cord dysfunction  Manifested as hoarseness. Seen by ENT (Dr. Kyree Bearden) on 6/21/21 and noted to have left true vocal fold motion impairment from mediastinal mass. Underwent injection of ProLaryn (filler/bulking agent) on 7/1/21, which reportedly has been minimally beneficial. Seen in follow-up by Dr. Bearden on 9/1/21 and noted to have minimal improvement; he was then referred to the [facility] ([facility]).   - No acute inpatient management issues  - Continue outpatient ENT follow-up as previously scheduled (next scheduled for 3/10/22)     # Oral candidiasis  # H/o mucositis  Significant 2/2 Doxil. Noted after C1 chemo, then better following C2 dose-reduction. On admission patient reports thrush again starting a couple days prior to admission. Restarted Nystatin at home on 1/3. Initially denied any mouth sores though endorses poor  taste and tongue coating. On admission, has recently started treatment for oral thrush in the past couple days.   - Continue salt/soda swishes  - Continue Nystatin QID with this cycle  - MMW, Doxepin available PRN   - Consider addition of fluconazole in coming days pending worsening/improvement.     DERM  # History of palmar-plantar erythrodysesthesia (Hand-Foot Syndrome)  # New itching rash on bilateral hips mostly excoriations at this time  Due to Doxil. Noted following C2.  - Iced hands and mouth during Doxil infusion, tolerated well   - Will continue this practice with subsequent cycles  - Vaseline on rash on hips  - Derm consult for the a.m.     # Rosacea  - Continue PTA metronidazole gel     PSYCH  # History of anxiety/depression  Situational, related to malaise/illness. Previously placed health psych referral.  - No acute inpatient management needs  - Health psychology consult placed today.      FEN:  - Encouraged PO fluids, bolus PRN  - PRN lyte replacement  - RDAT     Urgency with urination  - check UA     Prophy/Misc:  - VTE: ppx Lovenox 40mg daily during admission   - GI/PUD: PTA omeprazole, Tums PRN  - Bowels: Senna and Miralax PRN  - Activity: Consider consulting PT if indicated  - Dry eyes - refresh eye drops     Code: FULL  Disposition: Admitted to Oncology Team 3 service for scheduled chemotherapy 7-day stay. Anticipate discharge ~2/9 pending chemo timing and barring complications.    Follow-up: Follows with Dr. Wolff   - Twice weekly labs/infusion appts are scheduled at [facility] follow-up visit is scheduled on 2/14  - Will request add on Neulasta to infusion added to already-scheduled infusion      Patient seen and discussed with attending physician, Dr. Martinez.     I spent 60 minutes in the care of this patient today, which included time necessary for review of interval events, obtaining history and physical exam, ordering medications/tests/procedures as medically indicated, review of  "pertinent medical literature, counseling of the patient, coordination of care, and documentation time. Over 50% of time was spent counseling the patient and/or coordinating care.    Lauren Hairston PA-C   Hematology/Oncology   Pager: #[phone]     Interval History   Overnight no acute events. Patient tolerated initiation of chemotherapy well without concerns. Patient states he has been feeling more fatigued and \"down\" while at home - normally feels like he recovers for a couple days before coming in - but still fatigued. No fevers, chills, chest pain, shortness of breath, abdominal pain. Perhaps mild bilateral lower extremity edema, but notes this will probably get worse throughout admission. 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 Questions answered at bedside.    Physical Exam   Temp: 98.9  F (37.2  C) Temp src: Temporal BP: 121/69 Pulse: 90   Resp: 20 SpO2: 97 % O2 Device: None (Room air)    Vitals:    02/02/22 1640 02/03/22 0730   Weight: 66.3 kg (146 lb 1.6 oz) 65.6 kg (144 lb 9.6 oz)     Vital Signs with Ranges  Temp:  [96.7  F (35.9  C)-98.9  F (37.2  C)] 98.9  F (37.2  C)  Pulse:  [73-95] 90  Resp:  [18-20] 20  BP: (121-157)/(69-92) 121/69  SpO2:  [97 %-100 %] 97 %  I/O last 3 completed shifts:  In: 1034.85 [P.O.:480; I.V.:250; IV Piggyback:304.85]  Out: 600 [Urine:600]  Constitutional: Pleasant gentleman seen sitting up on edge of bed. No apparent distress, and appears stated age.  Eyes: Lids and lashes normal, sclera clear, conjunctiva normal.  ENT: Chronic hemifacial spasm. Normocephalic, without obvious abnormality, atraumatic, oral pharynx with moist mucus membranes. Scattered white spots.   Respiratory: No increased work of breathing, good air exchange, clear to auscultation bilaterally, no crackles or wheezing.  Cardiovascular: Regular rate and rhythm, normal S1 and S2, and no murmur noted.  GI: No masses or scars. +BS. Soft. No tenderness on palpation.  Skin: No bruising or " bleeding appreciated on limited exam. Normal skin color, texture and turgor without redness, warmth, or swelling. No jaundice.   Extremities: There is no redness, warmth, or swelling of the joints. Trace - 1+ lower extremity edema. No cyanosis.   Neurologic: Awake, alert, oriented to name, place and time.    Vascular access: Port    Medications     - MEDICATION INSTRUCTIONS -         Chemotherapy Infusing-Continuous Infusion   Does not apply Q8H     [START ON 2/9/2022] dextrose 5% water  10-20 mL Intracatheter Daily at 8 pm     [START ON 2/9/2022] dextrose 5% water  10-20 mL Intracatheter Daily at 8 pm     enoxaparin ANTICOAGULANT  40 mg Subcutaneous Q24H     [START ON 2/9/2022] filgrastim (NEUPOGEN/GRANIX) intravenous  5 mcg/kg (Treatment Plan Recorded) Intravenous Daily at 8 pm     hydrocortisone  10 mg Oral Daily     hydrocortisone  20 mg Oral Daily     ifosfamide (IFEX) infusion  1,350 mg/m2 (Treatment Plan Recorded) Intravenous Q24H     levothyroxine  75 mcg Oral QAM     [START ON 2/8/2022] mesna (MESNEX) infusion  1,350 mg/m2 (Treatment Plan Recorded) Intravenous Once     metroNIDAZOLE   Topical BID     [Held by provider] metroNIDAZOLE   Topical Daily     nystatin  500,000 Units Oral 4x Daily     OLANZapine  5 mg Oral At Bedtime     ondansetron  8 mg Oral Q8H     pantoprazole  40 mg Oral Daily     [Held by provider] phosphorus tablet 250 mg  500 mg Oral Daily     [Held by provider] potassium chloride ER  20 mEq Oral Daily     triamcinolone   Topical BID     Vitamin D3  25 mcg Oral Daily     White Petrolatum   Topical BID       Data   Results for orders placed or performed during the hospital encounter of 02/02/22 (from the past 24 hour(s))   CBC with platelets differential    Narrative    The following orders were created for panel order CBC with platelets differential.  Procedure                               Abnormality         Status                     ---------                               -----------          ------                     CBC with platelets and d...[[id number]]  Abnormal            Final result                 Please view results for these tests on the individual orders.   Comprehensive metabolic panel   Result Value Ref Range    Sodium 141 133 - 144 mmol/L    Potassium 4.1 3.4 - 5.3 mmol/L    Chloride 108 94 - 109 mmol/L    Carbon Dioxide (CO2) 29 20 - 32 mmol/L    Anion Gap 4 3 - 14 mmol/L    Urea Nitrogen 19 7 - 30 mg/dL    Creatinine 1.08 0.66 - 1.25 mg/dL    Calcium 9.2 8.5 - 10.1 mg/dL    Glucose 114 (H) 70 - 99 mg/dL    Alkaline Phosphatase 204 (H) 40 - 150 U/L    AST 40 0 - 45 U/L    ALT 48 0 - 70 U/L    Protein Total 7.1 6.8 - 8.8 g/dL    Albumin 3.2 (L) 3.4 - 5.0 g/dL    Bilirubin Total 1.3 0.2 - 1.3 mg/dL    GFR Estimate 72 >60 mL/min/1.73m2   CBC with platelets and differential   Result Value Ref Range    WBC Count 10.3 4.0 - 11.0 10e3/uL    RBC Count 3.19 (L) 4.40 - 5.90 10e6/uL    Hemoglobin 9.3 (L) 13.3 - 17.7 g/dL    Hematocrit 31.4 (L) 40.0 - 53.0 %    MCV 98 78 - 100 fL    MCH 29.2 26.5 - 33.0 pg    MCHC 29.6 (L) 31.5 - 36.5 g/dL    RDW 18.6 (H) 10.0 - 15.0 %    Platelet Count 275 150 - 450 10e3/uL    % Neutrophils 71 %    % Lymphocytes 12 %    % Monocytes 13 %    % Eosinophils 0 %    % Basophils 1 %    % Immature Granulocytes 3 %    NRBCs per 100 WBC 0 <1 /100    Absolute Neutrophils 7.3 1.6 - 8.3 10e3/uL    Absolute Lymphocytes 1.2 0.8 - 5.3 10e3/uL    Absolute Monocytes 1.4 (H) 0.0 - 1.3 10e3/uL    Absolute Eosinophils 0.0 0.0 - 0.7 10e3/uL    Absolute Basophils 0.1 0.0 - 0.2 10e3/uL    Absolute Immature Granulocytes 0.4 <=0.4 10e3/uL    Absolute NRBCs 0.0 10e3/uL   WBC and differential    Narrative    The following orders were created for panel order WBC and differential.  Procedure                               Abnormality         Status                     ---------                               -----------         ------                     WBC and Differential[[id number]]          Abnormal            Final result                 Please view results for these tests on the individual orders.   WBC and Differential   Result Value Ref Range    WBC Count 10.3 4.0 - 11.0 10e3/uL    % Neutrophils 71 %    % Lymphocytes 12 %    % Monocytes 13 %    % Eosinophils 0 %    % Basophils 1 %    % Immature Granulocytes 3 %    NRBCs per 100 WBC 0 <1 /100    Absolute Neutrophils 7.3 1.6 - 8.3 10e3/uL    Absolute Lymphocytes 1.2 0.8 - 5.3 10e3/uL    Absolute Monocytes 1.4 (H) 0.0 - 1.3 10e3/uL    Absolute Eosinophils 0.0 0.0 - 0.7 10e3/uL    Absolute Basophils 0.1 0.0 - 0.2 10e3/uL    Absolute Immature Granulocytes 0.4 <=0.4 10e3/uL    Absolute NRBCs 0.0 10e3/uL   Magnesium   Result Value Ref Range    Magnesium 2.5 (H) 1.6 - 2.3 mg/dL   Phosphorus   Result Value Ref Range    Phosphorus 2.8 2.5 - 4.5 mg/dL   Asymptomatic COVID-19 Virus (Coronavirus) by PCR Nasopharyngeal    Specimen: Nasopharyngeal; Swab   Result Value Ref Range    SARS CoV2 PCR Negative Negative, Testing sent to reference lab. Results will be returned via unsolicited result    Narrative    Testing was performed using the Xpert Xpress SARS-CoV-2 Assay on the  Cepheid Gene-Xpert Instrument Systems. Additional information about  this Emergency Use Authorization (EUA) assay can be found via the Lab  Guide. This test should be ordered for the detection of SARS-CoV-2 in  individuals who meet SARS-CoV-2 clinical and/or epidemiological  criteria. Test performance is unknown in asymptomatic patients. This  test is for in vitro diagnostic use under the FDA EUA for  laboratories certified under CLIA to perform high complexity testing.  This test has not been FDA cleared or approved. A negative result  does not rule out the presence of PCR inhibitors in the specimen or  target RNA in concentration below the limit of detection for the  assay. The possibility of a false negative should be considered if  the patient's recent exposure or clinical presentation  suggests  COVID-19. This test was validated by the [facility]. This laboratory is certified under  the Clinical Laboratory Improvement Amendments of 1988 (CLIA-88) as  qualified to perform high complexity laboratory testing.     CBC with platelets differential    Narrative    The following orders were created for panel order CBC with platelets differential.  Procedure                               Abnormality         Status                     ---------                               -----------         ------                     CBC with platelets and d...[[id number]]  Abnormal            Final result                 Please view results for these tests on the individual orders.   Comprehensive metabolic panel   Result Value Ref Range    Sodium 141 133 - 144 mmol/L    Potassium 3.9 3.4 - 5.3 mmol/L    Chloride 107 94 - 109 mmol/L    Carbon Dioxide (CO2) 29 20 - 32 mmol/L    Anion Gap 5 3 - 14 mmol/L    Urea Nitrogen 17 7 - 30 mg/dL    Creatinine 0.95 0.66 - 1.25 mg/dL    Calcium 8.6 8.5 - 10.1 mg/dL    Glucose 83 70 - 99 mg/dL    Alkaline Phosphatase 177 (H) 40 - 150 U/L    AST 28 0 - 45 U/L    ALT 38 0 - 70 U/L    Protein Total 6.2 (L) 6.8 - 8.8 g/dL    Albumin 2.8 (L) 3.4 - 5.0 g/dL    Bilirubin Total 0.4 0.2 - 1.3 mg/dL    GFR Estimate 85 >60 mL/min/1.73m2   Magnesium   Result Value Ref Range    Magnesium 2.3 1.6 - 2.3 mg/dL   Phosphorus   Result Value Ref Range    Phosphorus 2.8 2.5 - 4.5 mg/dL   CBC with platelets and differential   Result Value Ref Range    WBC Count 7.8 4.0 - 11.0 10e3/uL    RBC Count 2.87 (L) 4.40 - 5.90 10e6/uL    Hemoglobin 8.7 (L) 13.3 - 17.7 g/dL    Hematocrit 28.3 (L) 40.0 - 53.0 %    MCV 99 78 - 100 fL    MCH 30.3 26.5 - 33.0 pg    MCHC 30.7 (L) 31.5 - 36.5 g/dL    RDW 18.4 (H) 10.0 - 15.0 %    Platelet Count 229 150 - 450 10e3/uL    % Neutrophils 66 %    % Lymphocytes 15 %    % Monocytes 15 %    % Eosinophils 0 %    % Basophils 1 %    %  Immature Granulocytes 3 %    NRBCs per 100 WBC 0 <1 /100    Absolute Neutrophils 5.2 1.6 - 8.3 10e3/uL    Absolute Lymphocytes 1.1 0.8 - 5.3 10e3/uL    Absolute Monocytes 1.1 0.0 - 1.3 10e3/uL    Absolute Eosinophils 0.0 0.0 - 0.7 10e3/uL    Absolute Basophils 0.1 0.0 - 0.2 10e3/uL    Absolute Immature Granulocytes 0.2 <=0.4 10e3/uL    Absolute NRBCs 0.0 10e3/uL

## 2022-02-03 NOTE — PLAN OF CARE
[id number]  Afebrile, VSS.   No acute event.     NEURO: Alert and oriented x4.      RESPIRATORY: Room Air, denies dizziness, and SOB. Lungs sound, clear, equal bilateral.     CARDIO: VSS, denies dizziness, and extremity pain.      GI/:  Denies N/V, BS active, BM x1, AUOP     SKIN: Intact.      ACTIVITY: Independent      PAIN: Denies pain.    DLA: Port.     BG: No      PLAN:   Today:  - D2 Doxil/Ifos; tolerating well. Tentative discharge 2/9 barring any complications.  - Health psychology consult for ongoing support.   - Continue nystatin for thrush.     Continue monitoring.

## 2022-02-03 NOTE — CONSULTS
[facility] Inpatient Consult Dermatology Note    Impression/Plan:  1. Excoriated pink linear plaques  Likely in setting of diffuse xerosis 2/2 excoriations.  No evidence of active skin inflammatory process on our exam.  - Vaseline or aquaphor daily  - Gentle skin care; avoid potential skin irritants and allergens    Thank you for the dermatology consultation. We will sign off.    Patient seen and evaluated with attending physician, Dr. Arya Abraham MD  Dermatology Resident    I have seen and examined this patient and agree with the assessment and plan as documented in the resident's note.    Maximino Koenig MD  Dermatology Attending      Date of Admission: Jan 31, 2022   Encounter Date: 02/03/2022    Reason for Consultation:   rash    History of Present Illness:  Mr. 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 Patient does not recall itching these areas but they just appeared a few days ago. He thinks he tried applying his rosacea cream on the area in the past. Not itchy or bothersome.     Past Medical History:   Patient Active Problem List   Diagnosis     Calculus of kidney     Degeneration of lumbar or lumbosacral intervertebral disc     Eczema     Epidural lipomatosis     TUYET (generalized anxiety disorder)     Hypopituitarism (H)     Hypothyroidism     Osteoarthritis of spine with radiculopathy, lumbar region     Pituitary macroadenoma (H)     Rosacea     Chronic lower back pain     Spindle cell sarcoma (H)     Mesothelioma, malignant (H)     Vocal fold paralysis, left     Dysphonia     Muscle tension dysphonia     Mediastinal lymphadenopathy     Inguinal hernia     Chemotherapy-induced nausea     Hypophosphatemia     Anemia     Chemotherapy-induced neutropenia (H)      Sarcoma (H)     Encounter for other specified aftercare     Thrush     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     Thyroid disease     removed pituitary gland     Past Surgical History:   Procedure Laterality Date     COLONOSCOPY N/A 12/17/2020    Procedure: COLONOSCOPY;  Surgeon: Ken Camacho MD;  Location: [facility]     ENT SURGERY       HERNIA REPAIR       INSERT PORT VASCULAR ACCESS Right 1/28/2022    Procedure: INSERTION, VASCULAR ACCESS PORT;  Surgeon: Daniel Kinney MD;  Location: [facility] OR     IR CHEST PORT PLACEMENT > 5 YRS OF AGE  1/28/2022     LARYNGOSCOPY, EXCISE VOCAL CORD LESION MICROSCOPIC, COMBINED Left 07/01/2021    Procedure: MICROLARYNGOSCOPY, LEFT TRUE VOCAL CORD INJECTION WITH PROLARYN;  Surgeon: Kyree Bearden MD;  Location: [facility]     PHACOEMULSIFICATION WITH STANDARD INTRAOCULAR LENS IMPLANT Right 03/10/2021    Procedure: Cataract removal with implant.;  Surgeon: Jamir Mac MD;  Location: [facility]     PHACOEMULSIFICATION WITH STANDARD INTRAOCULAR LENS IMPLANT Left 04/05/2021    Procedure: Cataract removal with implant.;  Surgeon: Jamir Mac MD;  Location: [facility]     PICC DOUBLE LUMEN PLACEMENT Right 12/01/2021    5FR DL PICC, basilic vein. L-38cm, 1cm out.     PICC DOUBLE LUMEN PLACEMENT Right 01/04/2022    Right cephalic, 41 cm, 1 external length     PITUITARY EXCISION       tooth pulled 4/7  Right          Social History:  Patient reports that he has never smoked. He has never used smokeless tobacco. He reports current alcohol use. He reports that he does not use drugs.    Family History:  Family History   Problem Relation Age of Onset     Lupus Mother      ALS Father      Rheumatoid Arthritis Sister        Medications:  Current Facility-Administered Medications   Medication     0.9% sodium chloride BOLUS     acetaminophen (TYLENOL) tablet 500-1,000 mg     albuterol  (PROVENTIL HFA/VENTOLIN HFA) inhaler     albuterol (PROVENTIL) neb solution 2.5 mg     calcium carbonate (TUMS) chewable tablet 500 mg     carboxymethylcellulose PF (REFRESH PLUS) 0.5 % ophthalmic solution 1 drop     celecoxib (celeBREX) capsule 200 mg     Chemotherapy Infusing-Continuous Infusion     [START ON 2/9/2022] dextrose 5 % flush PRE/POST medication     [START ON 2/9/2022] dextrose 5 % flush PRE/POST medication     diphenhydrAMINE (BENADRYL) injection 50 mg     doxepin (SINEquan) 10 MG/ML (HIGH CONC) solution 20 mg     enoxaparin ANTICOAGULANT (LOVENOX) injection 40 mg     EPINEPHrine PF (ADRENALIN) injection 0.3 mg     famotidine (PEPCID) injection 20 mg     [START ON 2/9/2022] filgrastim 15 mcg/mL (in Dextrose) (NEUPOGEN) infusion 350 mcg     guaiFENesin-codeine (ROBITUSSIN AC) 100-10 MG/5ML solution 10 mL     heparin lock flush 10 UNIT/ML injection 5-10 mL     hydrocortisone (CORTEF) tablet 10 mg     hydrocortisone (CORTEF) tablet 20 mg     Ifosfamide (IFEX) 2,510 mg, mesna (MESNEX) 2,510 mg in sodium chloride 0.9 % 1,125 mL infusion     levothyroxine (SYNTHROID/LEVOTHROID) tablet 75 mcg     lidocaine-prilocaine (EMLA) cream     LORazepam (ATIVAN) tablet 0.5 mg     menthol (Topical Analgesic) 2.5% (BENGAY VANISHING SCENT) 2.5 % topical gel     meperidine (DEMEROL) injection 25 mg     [START ON 2/8/2022] mesna (MESNEX) 2,510 mg in sodium chloride 0.9 % 1,075 mL infusion     methylPREDNISolone sodium succinate (solu-MEDROL) injection 125 mg     metroNIDAZOLE (METROGEL) 0.75 % topical gel     [Held by provider] metroNIDAZOLE (METROGEL) 1 % gel     naloxone (NARCAN) injection 0.2 mg     No rectal suppositories if WBC less than 1000/microliters or platelets less than 50,000/ L     nystatin (MYCOSTATIN) suspension 500,000 Units     OLANZapine (zyPREXA) tablet 5 mg     ondansetron (ZOFRAN) tablet 4-8 mg     ondansetron (ZOFRAN) tablet 8 mg     pantoprazole (PROTONIX) EC tablet 40 mg     [Held by provider]  "phosphorus tablet 250 mg (PHOSPHA 250 NEUTRAL) per tablet 500 mg     [Held by provider] potassium chloride ER (KLOR-CON M) CR tablet 20 mEq     prochlorperazine (COMPAZINE) injection 5 mg     prochlorperazine (COMPAZINE) tablet 5 mg     sodium chloride (PF) 0.9% PF flush 10-20 mL     sodium chloride (PF) 0.9% PF flush 10-20 mL     SUMAtriptan (IMITREX) tablet 50 mg     triamcinolone (KENALOG) 0.1 % cream     Vitamin D3 (CHOLECALCIFEROL) tablet 25 mcg     White Petrolatum GEL          Allergies   Allergen Reactions     Penicillins Hives and Swelling                Review of Systems:  ROS per HPI    Physical exam:  Vitals: /69 (BP Location: Left arm)   Pulse 90   Temp 98.9  F (37.2  C) (Temporal)   Resp 20   Ht 1.702 m (5' 7\")   Wt 65.6 kg (144 lb 9.6 oz)   SpO2 97%   BMI 22.65 kg/m    GEN: This is a well developed, well-nourished male in no acute distress, in a pleasant mood.    SKIN: Focused examination of the thighs was performed.  - Linear pink excoriated plaques on bilateral superior posterior thighs  -No other lesions of concern on areas examined.               Laboratory:  Results for orders placed or performed during the hospital encounter of 02/02/22 (from the past 24 hour(s))   CBC with platelets differential    Narrative    The following orders were created for panel order CBC with platelets differential.  Procedure                               Abnormality         Status                     ---------                               -----------         ------                     CBC with platelets and d...[[id number]]  Abnormal            Final result                 Please view results for these tests on the individual orders.   Comprehensive metabolic panel   Result Value Ref Range    Sodium 141 133 - 144 mmol/L    Potassium 4.1 3.4 - 5.3 mmol/L    Chloride 108 94 - 109 mmol/L    Carbon Dioxide (CO2) 29 20 - 32 mmol/L    Anion Gap 4 3 - 14 mmol/L    Urea Nitrogen 19 7 - 30 mg/dL    Creatinine " 1.08 0.66 - 1.25 mg/dL    Calcium 9.2 8.5 - 10.1 mg/dL    Glucose 114 (H) 70 - 99 mg/dL    Alkaline Phosphatase 204 (H) 40 - 150 U/L    AST 40 0 - 45 U/L    ALT 48 0 - 70 U/L    Protein Total 7.1 6.8 - 8.8 g/dL    Albumin 3.2 (L) 3.4 - 5.0 g/dL    Bilirubin Total 1.3 0.2 - 1.3 mg/dL    GFR Estimate 72 >60 mL/min/1.73m2   CBC with platelets and differential   Result Value Ref Range    WBC Count 10.3 4.0 - 11.0 10e3/uL    RBC Count 3.19 (L) 4.40 - 5.90 10e6/uL    Hemoglobin 9.3 (L) 13.3 - 17.7 g/dL    Hematocrit 31.4 (L) 40.0 - 53.0 %    MCV 98 78 - 100 fL    MCH 29.2 26.5 - 33.0 pg    MCHC 29.6 (L) 31.5 - 36.5 g/dL    RDW 18.6 (H) 10.0 - 15.0 %    Platelet Count 275 150 - 450 10e3/uL    % Neutrophils 71 %    % Lymphocytes 12 %    % Monocytes 13 %    % Eosinophils 0 %    % Basophils 1 %    % Immature Granulocytes 3 %    NRBCs per 100 WBC 0 <1 /100    Absolute Neutrophils 7.3 1.6 - 8.3 10e3/uL    Absolute Lymphocytes 1.2 0.8 - 5.3 10e3/uL    Absolute Monocytes 1.4 (H) 0.0 - 1.3 10e3/uL    Absolute Eosinophils 0.0 0.0 - 0.7 10e3/uL    Absolute Basophils 0.1 0.0 - 0.2 10e3/uL    Absolute Immature Granulocytes 0.4 <=0.4 10e3/uL    Absolute NRBCs 0.0 10e3/uL   WBC and differential    Narrative    The following orders were created for panel order WBC and differential.  Procedure                               Abnormality         Status                     ---------                               -----------         ------                     WBC and Differential[[id number]]         Abnormal            Final result                 Please view results for these tests on the individual orders.   WBC and Differential   Result Value Ref Range    WBC Count 10.3 4.0 - 11.0 10e3/uL    % Neutrophils 71 %    % Lymphocytes 12 %    % Monocytes 13 %    % Eosinophils 0 %    % Basophils 1 %    % Immature Granulocytes 3 %    NRBCs per 100 WBC 0 <1 /100    Absolute Neutrophils 7.3 1.6 - 8.3 10e3/uL    Absolute Lymphocytes 1.2 0.8 - 5.3 10e3/uL     Absolute Monocytes 1.4 (H) 0.0 - 1.3 10e3/uL    Absolute Eosinophils 0.0 0.0 - 0.7 10e3/uL    Absolute Basophils 0.1 0.0 - 0.2 10e3/uL    Absolute Immature Granulocytes 0.4 <=0.4 10e3/uL    Absolute NRBCs 0.0 10e3/uL   Magnesium   Result Value Ref Range    Magnesium 2.5 (H) 1.6 - 2.3 mg/dL   Phosphorus   Result Value Ref Range    Phosphorus 2.8 2.5 - 4.5 mg/dL   Asymptomatic COVID-19 Virus (Coronavirus) by PCR Nasopharyngeal    Specimen: Nasopharyngeal; Swab   Result Value Ref Range    SARS CoV2 PCR Negative Negative, Testing sent to reference lab. Results will be returned via unsolicited result    Narrative    Testing was performed using the [employer] Xpress SARS-CoV-2 Assay on the  Cepheid Gene-Xpert Instrument Systems. Additional information about  this Emergency Use Authorization (EUA) assay can be found via the Lab  Guide. This test should be ordered for the detection of SARS-CoV-2 in  individuals who meet SARS-CoV-2 clinical and/or epidemiological  criteria. Test performance is unknown in asymptomatic patients. This  test is for in vitro diagnostic use under the FDA EUA for  laboratories certified under CLIA to perform high complexity testing.  This test has not been FDA cleared or approved. A negative result  does not rule out the presence of PCR inhibitors in the specimen or  target RNA in concentration below the limit of detection for the  assay. The possibility of a false negative should be considered if  the patient's recent exposure or clinical presentation suggests  COVID-19. This test was validated by the [facility]. This laboratory is certified under  the Clinical Laboratory Improvement Amendments of 1988 (CLIA-88) as  qualified to perform high complexity laboratory testing.     CBC with platelets differential    Narrative    The following orders were created for panel order CBC with platelets differential.  Procedure                                Abnormality         Status                     ---------                               -----------         ------                     CBC with platelets and d...[[id number]]  Abnormal            Final result                 Please view results for these tests on the individual orders.   Comprehensive metabolic panel   Result Value Ref Range    Sodium 141 133 - 144 mmol/L    Potassium 3.9 3.4 - 5.3 mmol/L    Chloride 107 94 - 109 mmol/L    Carbon Dioxide (CO2) 29 20 - 32 mmol/L    Anion Gap 5 3 - 14 mmol/L    Urea Nitrogen 17 7 - 30 mg/dL    Creatinine 0.95 0.66 - 1.25 mg/dL    Calcium 8.6 8.5 - 10.1 mg/dL    Glucose 83 70 - 99 mg/dL    Alkaline Phosphatase 177 (H) 40 - 150 U/L    AST 28 0 - 45 U/L    ALT 38 0 - 70 U/L    Protein Total 6.2 (L) 6.8 - 8.8 g/dL    Albumin 2.8 (L) 3.4 - 5.0 g/dL    Bilirubin Total 0.4 0.2 - 1.3 mg/dL    GFR Estimate 85 >60 mL/min/1.73m2   Magnesium   Result Value Ref Range    Magnesium 2.3 1.6 - 2.3 mg/dL   Phosphorus   Result Value Ref Range    Phosphorus 2.8 2.5 - 4.5 mg/dL   CBC with platelets and differential   Result Value Ref Range    WBC Count 7.8 4.0 - 11.0 10e3/uL    RBC Count 2.87 (L) 4.40 - 5.90 10e6/uL    Hemoglobin 8.7 (L) 13.3 - 17.7 g/dL    Hematocrit 28.3 (L) 40.0 - 53.0 %    MCV 99 78 - 100 fL    MCH 30.3 26.5 - 33.0 pg    MCHC 30.7 (L) 31.5 - 36.5 g/dL    RDW 18.4 (H) 10.0 - 15.0 %    Platelet Count 229 150 - 450 10e3/uL    % Neutrophils 66 %    % Lymphocytes 15 %    % Monocytes 15 %    % Eosinophils 0 %    % Basophils 1 %    % Immature Granulocytes 3 %    NRBCs per 100 WBC 0 <1 /100    Absolute Neutrophils 5.2 1.6 - 8.3 10e3/uL    Absolute Lymphocytes 1.1 0.8 - 5.3 10e3/uL    Absolute Monocytes 1.1 0.0 - 1.3 10e3/uL    Absolute Eosinophils 0.0 0.0 - 0.7 10e3/uL    Absolute Basophils 0.1 0.0 - 0.2 10e3/uL    Absolute Immature Granulocytes 0.2 <=0.4 10e3/uL    Absolute NRBCs 0.0 10e3/uL       Dr. Koenig staffed the patient.    Staff Involved:  Resident/Staff

## 2022-02-03 NOTE — PHARMACY-ADMISSION MEDICATION HISTORY
Admission Medication History Completed by Pharmacy    See [facility] Admission Navigator for allergy information, preferred outpatient pharmacy, prior to admission medications and immunization status.     Medication History Sources:     Patient    SureScripts    Changes made to PTA medication list (reason):    Added: None    Deleted: None    Changed: None    Additional Information:    None    Prior to Admission medications    Medication Sig Last Dose Taking? Auth Provider   acetaminophen (TYLENOL) 500 MG tablet Take 500-1,000 mg by mouth every 6 hours as needed for mild pain Past Week at Unknown time Yes Unknown, Entered By History   calcium carbonate (TUMS) 500 MG chewable tablet Take 1 tablet (500 mg) by mouth 3 times daily as needed for heartburn Past Month at Unknown time Yes Chiquita Norris PA-C   celecoxib (CELEBREX) 200 MG capsule Take 1 capsule (200 mg) by mouth 2 times daily. Note this is a new a strength! Only one capsule at a time! Past Week at Unknown time Yes Sal Handy MD   cholecalciferol (VITAMIN D-1000 MAX ST) 1000 units TABS Take 1,000 Units by mouth daily  2/2/2022 at Unknown time Yes Reported, Patient   doxepin (SINEQUAN) 10 MG/ML (HIGH CONC) solution Take 2 mLs (20 mg) by mouth every 4 hours as needed for other (mouth pain) . Mix with equal parts tap water. Swish and hold in mouth ~60 seconds then spit out. More than a month at Unknown time Yes Sal Handy MD   guaiFENesin-codeine (GUAIFENESIN AC) 100-10 MG/5ML syrup Take 10 mLs by mouth every 4 hours as needed for cough Past Month at Unknown time Yes Maynor Rios PA   hydrocortisone (CORTEF) 10 MG tablet Take 20 mg in the morning and 10 mg in the afternoon 2/2/2022 at Unknown time Yes Jamir Grant MD   levothyroxine (SYNTHROID/LEVOTHROID) 75 MCG tablet Take 1 tablet (75 mcg) by mouth every morning 2/2/2022 at Unknown time Yes Jamir Grant MD   LORazepam (ATIVAN) 0.5 MG tablet Take 1  tablet (0.5 mg) by mouth every 4 hours as needed for nausea or vomiting . Caution: causes sedation. Unknown at Unknown time Yes Rayna Martin PA-C   Menthol-Methyl Salicylate (DALIA MALIK GREASELESS) cream Apply topically every 6 hours as needed More than a month at Unknown time Yes Unknown, Entered By History   metroNIDAZOLE (METROGEL) 0.75 % external gel Apply topically 2 times daily 2/2/2022 at Unknown time Yes Fabiola Armstrong PA-C   metroNIDAZOLE (METROGEL) 1 % external gel Apply topically daily .Try stronger dose due to increased symptoms after chemo. 2/2/2022 at Unknown time Yes Maynor Rios PA   nystatin (MYCOSTATIN) 221146 UNIT/ML suspension Take 5 mLs (500,000 Units) by mouth 4 times daily Hold while taking Fluconazole, could resume after Fluconazole if you have ongoing coating on tongue. 2/2/2022 at Unknown time Yes Izabella Owens PA-C   OLANZapine (ZYPREXA) 5 MG tablet Take 1 tablet (5 mg) by mouth At Bedtime Continue until your nausea resolves 2/2/2022 at Unknown time Yes Maynor Rios PA   omeprazole (PRILOSEC) 20 MG DR capsule Take 2 capsules (40 mg) by mouth daily 2/2/2022 at Unknown time Yes Maynor Rios PA   ondansetron (ZOFRAN) 4 MG tablet Take 1-2 tablets (4-8 mg) by mouth every 8 hours as needed for nausea 2/1/2022 at Unknown time Yes Maynor Rios PA   phosphorus tablet 250 mg (PHOSPHA 250 NEUTRAL) 250 MG per tablet Take 2 tablets (500 mg) by mouth daily 2/2/2022 at Unknown time Yes Maynor Rios PA   potassium chloride ER (K-TAB) 20 MEQ CR tablet Take 1 tablet (20 mEq) by mouth daily 2/2/2022 at Unknown time Yes Maynor Rios PA   prochlorperazine (COMPAZINE) 5 MG tablet Take 1 tablet (5 mg) by mouth every 6 hours as needed for nausea or vomiting . Caution: causes sedation. Past Week at Unknown time Yes Rayna Martin PA-C   SUMAtriptan (IMITREX) 50 MG tablet Take 1 tablet (50 mg) by mouth at onset of headache for  "migraine May repeat in 2 hours. Max 4 tablets/24 hours. More than a month at Unknown time Yes Maynor Rios PA   triamcinolone (KENALOG) 0.1 % external cream Apply topically 2 times daily  2/2/2022 at Unknown time Yes Reported, Patient   Insulin Syringe-Needle U-100 27.5G X 5/8\" 2 ML MISC 1 each daily as needed (with solucortef for adrenal crisis)   Jamir Grant MD       Date completed: 02/03/22    Medication history completed by: Chato Whatley, PharmD            "

## 2022-02-03 NOTE — PLAN OF CARE
[id number]    A/Ox4. Afebrile. Hypertensive - within parameters. OVSS on RA. C/o intermittent lower back pain, but declined intervention. Denies SOB and N/V. Fair appetite. Pt voiding spontaneously without saving urine, but educated to save and agreeable. R-Port infusing Dose #1 Doxil; good blood q4hr. Plan to start Dose #1 Ifos/Mesna after Doxil is completed.

## 2022-02-03 NOTE — PROGRESS NOTES
"Clinic Care Coordination Contact  Ambulatory Care Coordination to Inpatient Care Management   Hand-In Communication    Date:  February 3, 2022  Name: Ifrah Huitron is enrolled in Ambulatory Care Coordination program and I am the Lead Care Coordinator.  CC Contact Information: Epic InFengguosket + phone  Payor Source: Payor: MEDICARE / Plan: MEDICARE / Product Type: Medicare /   Current services in place:     Please see the CC Snaphot and Care Management Flowsheets for specific  details of this Ifrah Huitron care plan.   Additional details/specific concerns r/t this admission:    Goal Statement: I want to \"whip\" this cancer   Date Goal set: 11/3/2021  Barriers: None identified   Strengths: Motivated and great family support   Date to Achieve By: 3/3/2022  Patient expressed understanding of goal: Yes  Action steps to achieve this goal:  1. I will keep my future  Primary care provider,ENT,Endocrinology and Oncology appointments   2. I will take my medications as directed and keep future Chemotherapy appointments         I will follow this admission in Epic. Please feel free to contact me with questions or for further collaboration in discharge planning.  [facility]   Oliva García RN, Care Coordinator   [facility]'s   E-mail [email or web address]   [phone]    "

## 2022-02-04 LAB
ALBUMIN SERPL-MCNC: 2.5 G/DL (ref 3.4–5)
ALP SERPL-CCNC: 158 U/L (ref 40–150)
ALT SERPL W P-5'-P-CCNC: 30 U/L (ref 0–70)
ANION GAP SERPL CALCULATED.3IONS-SCNC: 8 MMOL/L (ref 3–14)
AST SERPL W P-5'-P-CCNC: 16 U/L (ref 0–45)
BASOPHILS # BLD AUTO: 0.1 10E3/UL (ref 0–0.2)
BASOPHILS NFR BLD AUTO: 1 %
BILIRUB SERPL-MCNC: 0.3 MG/DL (ref 0.2–1.3)
BUN SERPL-MCNC: 15 MG/DL (ref 7–30)
CALCIUM SERPL-MCNC: 8.9 MG/DL (ref 8.5–10.1)
CHLORIDE BLD-SCNC: 107 MMOL/L (ref 94–109)
CO2 SERPL-SCNC: 26 MMOL/L (ref 20–32)
CREAT SERPL-MCNC: 1.04 MG/DL (ref 0.66–1.25)
EOSINOPHIL # BLD AUTO: 0 10E3/UL (ref 0–0.7)
EOSINOPHIL NFR BLD AUTO: 1 %
ERYTHROCYTE [DISTWIDTH] IN BLOOD BY AUTOMATED COUNT: 18.2 % (ref 10–15)
GFR SERPL CREATININE-BSD FRML MDRD: 76 ML/MIN/1.73M2
GLUCOSE BLD-MCNC: 84 MG/DL (ref 70–99)
HCT VFR BLD AUTO: 26.6 % (ref 40–53)
HGB BLD-MCNC: 8.1 G/DL (ref 13.3–17.7)
IMM GRANULOCYTES # BLD: 0.2 10E3/UL
IMM GRANULOCYTES NFR BLD: 2 %
LYMPHOCYTES # BLD AUTO: 1.3 10E3/UL (ref 0.8–5.3)
LYMPHOCYTES NFR BLD AUTO: 18 %
MAGNESIUM SERPL-MCNC: 2.3 MG/DL (ref 1.6–2.3)
MCH RBC QN AUTO: 30.1 PG (ref 26.5–33)
MCHC RBC AUTO-ENTMCNC: 30.5 G/DL (ref 31.5–36.5)
MCV RBC AUTO: 99 FL (ref 78–100)
MONOCYTES # BLD AUTO: 1.2 10E3/UL (ref 0–1.3)
MONOCYTES NFR BLD AUTO: 17 %
NEUTROPHILS # BLD AUTO: 4.2 10E3/UL (ref 1.6–8.3)
NEUTROPHILS NFR BLD AUTO: 61 %
NRBC # BLD AUTO: 0 10E3/UL
NRBC BLD AUTO-RTO: 0 /100
PHOSPHATE SERPL-MCNC: 2.9 MG/DL (ref 2.5–4.5)
PLATELET # BLD AUTO: 224 10E3/UL (ref 150–450)
POTASSIUM BLD-SCNC: 3.4 MMOL/L (ref 3.4–5.3)
POTASSIUM BLD-SCNC: 3.4 MMOL/L (ref 3.4–5.3)
POTASSIUM BLD-SCNC: 3.8 MMOL/L (ref 3.4–5.3)
PROT SERPL-MCNC: 5.9 G/DL (ref 6.8–8.8)
RBC # BLD AUTO: 2.69 10E6/UL (ref 4.4–5.9)
SODIUM SERPL-SCNC: 141 MMOL/L (ref 133–144)
T4 FREE SERPL-MCNC: 1.45 NG/DL (ref 0.76–1.46)
TSH SERPL DL<=0.005 MIU/L-ACNC: 0.21 MU/L (ref 0.4–4)
WBC # BLD AUTO: 7 10E3/UL (ref 4–11)

## 2022-02-04 PROCEDURE — 84443 ASSAY THYROID STIM HORMONE: CPT | Performed by: PHYSICIAN ASSISTANT

## 2022-02-04 PROCEDURE — 250N000013 HC RX MED GY IP 250 OP 250 PS 637: Performed by: PEDIATRICS

## 2022-02-04 PROCEDURE — 84132 ASSAY OF SERUM POTASSIUM: CPT | Performed by: STUDENT IN AN ORGANIZED HEALTH CARE EDUCATION/TRAINING PROGRAM

## 2022-02-04 PROCEDURE — 250N000013 HC RX MED GY IP 250 OP 250 PS 637: Performed by: PHYSICIAN ASSISTANT

## 2022-02-04 PROCEDURE — 84100 ASSAY OF PHOSPHORUS: CPT | Performed by: PHYSICIAN ASSISTANT

## 2022-02-04 PROCEDURE — 36591 DRAW BLOOD OFF VENOUS DEVICE: CPT | Performed by: PHYSICIAN ASSISTANT

## 2022-02-04 PROCEDURE — 36591 DRAW BLOOD OFF VENOUS DEVICE: CPT | Performed by: STUDENT IN AN ORGANIZED HEALTH CARE EDUCATION/TRAINING PROGRAM

## 2022-02-04 PROCEDURE — 83735 ASSAY OF MAGNESIUM: CPT | Performed by: PHYSICIAN ASSISTANT

## 2022-02-04 PROCEDURE — 120N000002 HC R&B MED SURG/OB UMMC

## 2022-02-04 PROCEDURE — 99233 SBSQ HOSP IP/OBS HIGH 50: CPT | Mod: FS | Performed by: STUDENT IN AN ORGANIZED HEALTH CARE EDUCATION/TRAINING PROGRAM

## 2022-02-04 PROCEDURE — 250N000011 HC RX IP 250 OP 636: Performed by: PHYSICIAN ASSISTANT

## 2022-02-04 PROCEDURE — 80053 COMPREHEN METABOLIC PANEL: CPT | Performed by: PHYSICIAN ASSISTANT

## 2022-02-04 PROCEDURE — 85025 COMPLETE CBC W/AUTO DIFF WBC: CPT | Performed by: PHYSICIAN ASSISTANT

## 2022-02-04 PROCEDURE — 84439 ASSAY OF FREE THYROXINE: CPT | Performed by: PHYSICIAN ASSISTANT

## 2022-02-04 PROCEDURE — 258N000003 HC RX IP 258 OP 636: Performed by: PHYSICIAN ASSISTANT

## 2022-02-04 PROCEDURE — 250N000013 HC RX MED GY IP 250 OP 250 PS 637: Performed by: STUDENT IN AN ORGANIZED HEALTH CARE EDUCATION/TRAINING PROGRAM

## 2022-02-04 RX ORDER — POTASSIUM CHLORIDE 750 MG/1
20 TABLET, EXTENDED RELEASE ORAL ONCE
Status: COMPLETED | OUTPATIENT
Start: 2022-02-04 | End: 2022-02-04

## 2022-02-04 RX ORDER — HYDRALAZINE HYDROCHLORIDE 10 MG/1
10 TABLET, FILM COATED ORAL 4 TIMES DAILY PRN
Status: DISCONTINUED | OUTPATIENT
Start: 2022-02-04 | End: 2022-02-08 | Stop reason: HOSPADM

## 2022-02-04 RX ADMIN — LORAZEPAM 0.5 MG: 0.5 TABLET ORAL at 20:16

## 2022-02-04 RX ADMIN — TRIAMCINOLONE ACETONIDE: 1 CREAM TOPICAL at 09:29

## 2022-02-04 RX ADMIN — METRONIDAZOLE: 7.5 GEL TOPICAL at 09:28

## 2022-02-04 RX ADMIN — NYSTATIN 500000 UNITS: 100000 SUSPENSION ORAL at 21:42

## 2022-02-04 RX ADMIN — PANTOPRAZOLE SODIUM 40 MG: 40 TABLET, DELAYED RELEASE ORAL at 09:28

## 2022-02-04 RX ADMIN — HYDROCORTISONE 10 MG: 10 TABLET ORAL at 15:56

## 2022-02-04 RX ADMIN — POTASSIUM CHLORIDE 20 MEQ: 750 TABLET, EXTENDED RELEASE ORAL at 11:32

## 2022-02-04 RX ADMIN — NYSTATIN 500000 UNITS: 100000 SUSPENSION ORAL at 10:33

## 2022-02-04 RX ADMIN — ENOXAPARIN SODIUM 40 MG: 40 INJECTION SUBCUTANEOUS at 20:17

## 2022-02-04 RX ADMIN — NYSTATIN 500000 UNITS: 100000 SUSPENSION ORAL at 15:56

## 2022-02-04 RX ADMIN — TRIAMCINOLONE ACETONIDE: 1 CREAM TOPICAL at 20:17

## 2022-02-04 RX ADMIN — ONDANSETRON HYDROCHLORIDE 8 MG: 8 TABLET, FILM COATED ORAL at 09:28

## 2022-02-04 RX ADMIN — MESNA 2510 MG: 100 INJECTION, SOLUTION INTRAVENOUS at 00:32

## 2022-02-04 RX ADMIN — HYDROCORTISONE 20 MG: 10 TABLET ORAL at 11:32

## 2022-02-04 RX ADMIN — ONDANSETRON HYDROCHLORIDE 8 MG: 8 TABLET, FILM COATED ORAL at 15:56

## 2022-02-04 RX ADMIN — LEVOTHYROXINE SODIUM 75 MCG: 0.05 TABLET ORAL at 05:43

## 2022-02-04 RX ADMIN — OLANZAPINE 5 MG: 5 TABLET, FILM COATED ORAL at 21:42

## 2022-02-04 RX ADMIN — Medication 25 MCG: at 11:32

## 2022-02-04 RX ADMIN — ACETAMINOPHEN 1000 MG: 500 TABLET ORAL at 17:54

## 2022-02-04 RX ADMIN — POTASSIUM CHLORIDE 20 MEQ: 750 TABLET, EXTENDED RELEASE ORAL at 17:51

## 2022-02-04 RX ADMIN — METRONIDAZOLE: 7.5 GEL TOPICAL at 20:17

## 2022-02-04 ASSESSMENT — ACTIVITIES OF DAILY LIVING (ADL)
ADLS_ACUITY_SCORE: 5

## 2022-02-04 ASSESSMENT — MIFFLIN-ST. JEOR: SCORE: 1348.63

## 2022-02-04 NOTE — PROGRESS NOTES
Nursing Focus: Chemotherapy  D: Positive blood return via port. Insertion site is clean/dry/intact, dressing intact with no complaints of pain.  Urine output is recorded in intake in Doc Flowsheet.    I: Premedications given per order (see electronic medical administration record). Dose #2 of Ifos/Mesna started to infuse over 24 hours. Reviewed pt teaching on chemotherapy side effects.  Pt denies need for further teaching. Chemotherapy double checked per protocol by two chemotherapy competent RN's.   A: Tolerating procedure well. Denies nausea and or pain.   P: Continue to monitor urine output and symptoms of nausea. Screen for symptoms of toxicity.

## 2022-02-04 NOTE — CONSULTS
Health Psychology                                                                                                                          Krystina Montes, Ph.D., L.P [phone]  Elle Massey, Ph.D., L.P. [phone]  Ashtyn Arora, Ph.D. [phone]  Indigo Henry, Ph.D., L.P. [phone]  Roger Ellsworth, Ph.D., A.B.P.P., L.P. [phone]         Ciarra De, Ph.D., L.P. [phone]                [facility] [place], 3rd Floor  [address] [place] [address]      Confidential Summary of Inpatient Health Psychology Consultation*    Date of Service:  02/04/22    Referring Provider:  Lauren Hairston PA-C, Heme/onc    Reason for Consultation:  History of situational anxiety/depression related to health status, meet for support    Sources of Information:  Information was obtained from a clinical interview with the patient and review of medical record.    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    Attempted to meet with Ifrah and he was not in his room.  His roommate saw me enter and said that Ifrah was getting lunch.  Unable to return today and will plan to meet with him Monday.    PLAN: Follow-up with patient on Monday, 2/7    Ashtyn Arora, PhD,   Clinical Health Psychologist  Phone: [phone]  Pager: [phone]

## 2022-02-04 NOTE — PLAN OF CARE
[id number]: Hypertensive within parameters. OVSS on RA. C/o 1/10 back pain- manageable without intervention. CIVI chemo infusing with good blood return. Up walking in the hallways during the evening. Nausea controlled with scheduled antiemetics.

## 2022-02-04 NOTE — PROGRESS NOTES
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     Today:  - D3 Doxil/Ifos; tolerating well apart from mild nausea. Tentative discharge 2/9 barring any complications.  - Discussion this AM surrounding ongoing mood changes with feeling increasingly \"down\" recently. Patient would like to see health psychology (consult placed) and would consider starting a medication. Plan to visit him this PM to discuss starting lexapro 10 mg, however patient was sleeping comfortably, so will defer to tomorrow AM. Add on TSH.  - Continue nystatin for thrush.    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 Wolff. Patient presented to his PCP in 03/2021 with chest/left shoulder and back pain that led to imaging which revealed multiple lung mets, included a left pleural mass. There were difficulties in getting diagnostic biopsy; eventually, biopsy of the mediastinal mass (5/20/21) revealed a poorly characterized malignant spindle cell neoplasm with high PD-L1 expression (90%), Ki-67 70%. 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 Insufficient material to send [facility]. Baseline imaging (6/21/21) revealed progression of lung mets and left-sided subdiaphragmatic mass, stable liver lesions. He was seen by ENT for hoarseness, which was attributed to left true vocal fold motion impairment from mediastinal mass. Given high PD-L1 expression, he was started on Keytruda (C1=6/21/21). Interval imaging (CT 8/2/21) revealed positive response to " treatment. He received 6 cycles total, most recently C6=10/4/21. Unfortunately, restaging imaging (10/18/21) revealed interval increase in size of the LUQ/splenic mass; however, the mediastinal and left pleural mass were stable to slightly decreased in size. He met with Dr. Wolff, who recommended a change in therapy to Doxil/ifosfamide. He was admitted on 10/26/21 to receive Cycle #1 of Doxil/ifos which he tolerated reasonably well with no neurotoxicities, but did have moderate chemotherapy-induced nausea. After discharge he had significant mucositis, poor PO intake, nausea, and lyte derangements. Given adverse effects, Cycle 2 was delayed by 1 week and Doxil was dose reduced to 70 mg, or 40 mg/m2 (previously given 80 mg, or 45mg/m2), and ifosfamide was reduced by 10% (1500mg/m2 ? 1350mg/m2).   - Tolerated Cycle 2 Doxil/Ifos (D1=12/1/21) better, so plan to proceed with same dosing for cycle 3.  - Cycle 3 tolerated well with same dose, plan to proceed with same dose.    - Port placed outpatient on 1/28/22; will access on admission.   - Outpatient team plans to restage with repeat imaging after Cycle 4.                                            Treatment Plan: Doxil + ifosfamide (C4D1=2/2/22).               Doxil 70mg IV once - D1                Ifosfamide/Mesna 1350 mg/m2 (2510 mg) CIVI - D1-6                Mesna 1350 mg/m2 (2510 mg) IV over 18 hrs - starting D7                Pre-meds: Emend 150 mg once Day 1 and Day 4; Zofran 8 mg Q8H                Neulasta injection - D8 - will request @ [facility] on 2/10     # Cancer-related pain  Per patient, pain is much improved from previous and has recently discontinued scheduled celebex.   - Continue PTA topical Bengay PRN at bedtime to left back   - Continue PTA Celebrex 200 mg BID PRN.     # Normocytic anemia  Noted intermittently. Likely related to chemo and underlying disease.   - Transfuse to maintain Hgb >7, will sign blood consent if indicated     CV  # CAD  "  Followed by Dr. Gigi Vernon at [facility]. He was noted to have coronary calcium on chest CT. CTA 9/29/21 with moderate mid LAD stenosis. Small to moderate caliber ramus branch with severe diffuse disease. Echo completed 9/29/21 with normal EF and no valvular dysfunction. Repeat ECHO on 10/27 with EF 60-65%, early diastolic dysfunction but otherwise no change from previous. Previously has been on rosuvastatin 40 mg daily, however this was recently held due to transaminitis  - Encourage outpatient cardiology follow-up as recommended     # H/o intermittent elevated blood pressures   BPs ranging from normotensive to hypertensive (with SBP 140s-160) during previous admissions. He states that he runs higher at baseline (though this is based on clinic assessments as he does not typically monitor his BP at home). Appears recent intermittent clinic BPs sit at 140s-150s/80s-90s when seen in person, otherwise many of the visits are currently virtual. He is not on any antihypertensive medications PTA. Suspect he has undiagnosed essential HTN. Will monitor closely this admission.    - Would recommend outpatient PCP follow-up for further discussion of BP monitoring and management.   - Oral hydralazine available PRN (SBP >170, DBP >100)     GI  # Chemotherapy-induced nausea  Improved C2 with the following regimen, plan to continue this admission. Per patient, nausea typically \"kicks in\" around day 3 or 4.   - Scheduled Zofran Q8H  - PRN compazine and Ativan  - Emend 150 mg IV x1 on D1; re-dosed on D4  - Zyprexa 5 mg at bedtime which has been helpful in past      # Transaminitis, resolved  First noted in late Nov: , , TBili WNL on 11/29/21. PTA statin was subsequently held. Per outpatient notes, he did not have any abdominal pain or other symptoms concerning for viral hepatitis. Improved/resolved with holding PTA statin - which has been held on admission. He also has known liver metastases which " are likely contributing.   - Okay to continue to hold PTA statin  - Follow LFTs daily     # GERD  - Continue PTA omeprazole  - TUMS and Pepto Bismol available PRN      RENAL/FEN  # Poor PO intake  Receives 2x weekly IVF outpatient due to poor PO intake with chemotherapy cycles. Was notably hypervolemic with last cycle, so will carefully monitor with cycle 4. Cr 0.97 on 1/31 (baseline ~0.8), so will assess kidney function on admission and bolus PRN.   - Follow I/Os, daily weights.  - Trend daily CMP.   - No mIVF at this time; bolus PRN.      # Hypophosphatemia, intermittent  # Hypokalemia, intermittent  Secondary to ifosfamide. Required additional phos and K replacement after recent discharge from  doxil/Ifos. Most recent labs on 1/31 notable for phos 2.5, K 3.5.  - Check Phos, K on admission and assess need for replacement.  - Continue PTA PO Phos and KCl   - Replete additional per standing protocol     ENDO  # Panhypopituitarism  # Macroadenoma of the pituitary gland  Diagnosed in 2010. Found to have a pituitary macroadenoma, 1.9 cm in largest dimension. Underwent hypophysectomy on 8/23/10. Subsequent presumptive pituitary deficiency, on empiric meds for adrenal insufficiency and thyroid. Previously followed by Dr. Bill Mccall; now has established care with Dr. Jamir Grant on 11/11/21.  - Continue PTA Levothyroxine  - Continue PTA Hydrocortisone 15 mg qAM and 10 mg qPM  - Continue endocrinology follow-up as scheduled (next scheduled for 5/16/22)     ENT  # Vocal cord dysfunction  Manifested as hoarseness. Seen by ENT (Dr. Kyree Bearden) on 6/21/21 and noted to have left true vocal fold motion impairment from mediastinal mass. Underwent injection of ProLaryn (filler/bulking agent) on 7/1/21, which reportedly has been minimally beneficial. Seen in follow-up by Dr. Bearden on 9/1/21 and noted to have minimal improvement; he was then referred to the [facility] ([facility]).   - No acute inpatient management  "issues  - Continue outpatient ENT follow-up as previously scheduled (next scheduled for 3/10/22)     # Oral candidiasis  # H/o mucositis  Significant 2/2 Doxil. Noted after C1 chemo, then better following C2 dose-reduction. On admission patient reports thrush again starting a couple days prior to admission. Restarted Nystatin at home on 1/3. Initially denied any mouth sores though endorses poor taste and tongue coating. On admission, has recently started treatment for oral thrush in the past couple days.   - Continue salt/soda swishes  - Continue Nystatin QID with this cycle  - MMW, Doxepin available PRN   - Consider addition of fluconazole in coming days pending worsening/improvement.     DERM  # History of palmar-plantar erythrodysesthesia (Hand-Foot Syndrome)  # New itching rash on bilateral hips mostly excoriations at this time, resolved  Due to Doxil. Noted following C2.  - Iced hands and mouth during Doxil infusion, tolerated well   - Will continue this practice with subsequent cycles  - Vaseline on rash on hips     # Rosacea  - Continue PTA metronidazole gel     PSYCH  # History of anxiety/depression  Situational, related to malaise/illness. Worsening per patient as of past few weeks. Discussed with patient on 2/4 surrounding ongoing mood changes with feeling increasingly \"down\" recently. Patient would like to see health psychology (consult placed) and would consider starting a medication. Plan to visit him this PM to discuss starting lexapro 10 mg, however patient was sleeping comfortably, so will defer to tomorrow AM. Add on TSH.  - No acute inpatient management needs     FEN:  - Encouraged PO fluids, bolus PRN  - PRN lyte replacement  - RDAT     Prophy/Misc:  - VTE: ppx Lovenox 40mg daily during admission   - GI/PUD: PTA omeprazole, Tums PRN  - Bowels: Senna and Miralax PRN  - Activity: Consider consulting PT if indicated  - Dry eyes - refresh eye drops     Code: FULL  Disposition: Admitted to Oncology " "Team 3 service for scheduled chemotherapy 7-day stay. Anticipate discharge ~2/9 pending chemo timing and barring complications.    Follow-up: Follows with Dr. Wolff   - Twice weekly labs/infusion appts are scheduled at [facility] follow-up visit is scheduled on 2/14  - Will request add on Neulasta to infusion added to already-scheduled infusion      Patient seen and discussed with attending physician, Dr. Martinez.     I spent 60 minutes in the care of this patient today, which included time necessary for review of interval events, obtaining history and physical exam, ordering medications/tests/procedures as medically indicated, review of pertinent medical literature, counseling of the patient, coordination of care, and documentation time. Over 50% of time was spent counseling the patient and/or coordinating care.    Lauren Hairston PA-C   Hematology/Oncology   Pager: #[phone]     Interval History   Overnight no acute events. Patient tolerating chemotherapy well, just mild nausea this AM for which he had just received initiation of chemotherapy well without concerns. Patient states he has been feeling more fatigued and \"down\" while at home. No fevers, chills, chest pain, shortness of breath, abdominal pain. Mild (1/10) back pain last night which did not require celebrex. Port placement site feeling better, although still sore. Perhaps mild bilateral lower extremity edema, but this is stable from day prior. Wife planning to visit today. Questions answered at bedside.    Physical Exam   Temp: 98.9  F (37.2  C) Temp src: Temporal BP: 103/50 Pulse: 98   Resp: 16 SpO2: 99 % O2 Device: None (Room air)    Vitals:    02/02/22 1640 02/03/22 0730 02/04/22 0723   Weight: 66.3 kg (146 lb 1.6 oz) 65.6 kg (144 lb 9.6 oz) 64.5 kg (142 lb 3.2 oz)     Vital Signs with Ranges  Temp:  [96.6  F (35.9  C)-98.9  F (37.2  C)] 98.9  F (37.2  C)  Pulse:  [72-98] 98  Resp:  [16-18] 16  BP: (103-155)/(50-93) 103/50  SpO2:  [97 %-100 %] " 99 %  I/O last 3 completed shifts:  In: 650 [P.O.:640; I.V.:10]  Out: 1400 [Urine:1400]  Constitutional: Pleasant gentleman seen sitting up on edge of bed. No apparent distress, and appears stated age.  Eyes: Lids and lashes normal, sclera clear, conjunctiva normal.  ENT: Chronic hemifacial spasm. Normocephalic, without obvious abnormality, atraumatic, oral pharynx with moist mucus membranes. Scattered white spots.   Respiratory: No increased work of breathing, good air exchange, clear to auscultation bilaterally, no crackles or wheezing.  Cardiovascular: Regular rate and rhythm, normal S1 and S2, and no murmur noted.  GI: No masses or scars. +BS. Soft. No tenderness on palpation.  Skin: No bruising or bleeding appreciated on limited exam. Normal skin color, texture and turgor without redness, warmth, or swelling. No jaundice.   Extremities: There is no redness, warmth, or swelling of the joints. Trace - 1+ lower extremity edema. No cyanosis.   Neurologic: Awake, alert, oriented to name, place and time.    Vascular access: Port    Medications     - MEDICATION INSTRUCTIONS -         Chemotherapy Infusing-Continuous Infusion   Does not apply Q8H     [START ON 2/9/2022] dextrose 5% water  10-20 mL Intracatheter Daily at 8 pm     [START ON 2/9/2022] dextrose 5% water  10-20 mL Intracatheter Daily at 8 pm     enoxaparin ANTICOAGULANT  40 mg Subcutaneous Q24H     [START ON 2/9/2022] filgrastim (NEUPOGEN/GRANIX) intravenous  5 mcg/kg (Treatment Plan Recorded) Intravenous Daily at 8 pm     hydrocortisone  10 mg Oral Daily     hydrocortisone  20 mg Oral Daily     ifosfamide (IFEX) infusion  1,350 mg/m2 (Treatment Plan Recorded) Intravenous Q24H     levothyroxine  75 mcg Oral QAM     [START ON 2/8/2022] mesna (MESNEX) infusion  1,350 mg/m2 (Treatment Plan Recorded) Intravenous Once     metroNIDAZOLE   Topical BID     [Held by provider] metroNIDAZOLE   Topical Daily     nystatin  500,000 Units Oral 4x Daily     OLANZapine  5  mg Oral At Bedtime     ondansetron  8 mg Oral Q8H     pantoprazole  40 mg Oral Daily     [Held by provider] phosphorus tablet 250 mg  500 mg Oral Daily     [Held by provider] potassium chloride ER  20 mEq Oral Daily     triamcinolone   Topical BID     Vitamin D3  25 mcg Oral Daily     White Petrolatum   Topical BID       Data   Results for orders placed or performed during the hospital encounter of 02/02/22 (from the past 24 hour(s))   CBC with platelets differential    Narrative    The following orders were created for panel order CBC with platelets differential.  Procedure                               Abnormality         Status                     ---------                               -----------         ------                     CBC with platelets and d...[[id number]]  Abnormal            Final result                 Please view results for these tests on the individual orders.   Comprehensive metabolic panel   Result Value Ref Range    Sodium 141 133 - 144 mmol/L    Potassium 3.4 3.4 - 5.3 mmol/L    Chloride 107 94 - 109 mmol/L    Carbon Dioxide (CO2) 26 20 - 32 mmol/L    Anion Gap 8 3 - 14 mmol/L    Urea Nitrogen 15 7 - 30 mg/dL    Creatinine 1.04 0.66 - 1.25 mg/dL    Calcium 8.9 8.5 - 10.1 mg/dL    Glucose 84 70 - 99 mg/dL    Alkaline Phosphatase 158 (H) 40 - 150 U/L    AST 16 0 - 45 U/L    ALT 30 0 - 70 U/L    Protein Total 5.9 (L) 6.8 - 8.8 g/dL    Albumin 2.5 (L) 3.4 - 5.0 g/dL    Bilirubin Total 0.3 0.2 - 1.3 mg/dL    GFR Estimate 76 >60 mL/min/1.73m2   Magnesium   Result Value Ref Range    Magnesium 2.3 1.6 - 2.3 mg/dL   Phosphorus   Result Value Ref Range    Phosphorus 2.9 2.5 - 4.5 mg/dL   CBC with platelets and differential   Result Value Ref Range    WBC Count 7.0 4.0 - 11.0 10e3/uL    RBC Count 2.69 (L) 4.40 - 5.90 10e6/uL    Hemoglobin 8.1 (L) 13.3 - 17.7 g/dL    Hematocrit 26.6 (L) 40.0 - 53.0 %    MCV 99 78 - 100 fL    MCH 30.1 26.5 - 33.0 pg    MCHC 30.5 (L) 31.5 - 36.5 g/dL    RDW 18.2 (H)  10.0 - 15.0 %    Platelet Count 224 150 - 450 10e3/uL    % Neutrophils 61 %    % Lymphocytes 18 %    % Monocytes 17 %    % Eosinophils 1 %    % Basophils 1 %    % Immature Granulocytes 2 %    NRBCs per 100 WBC 0 <1 /100    Absolute Neutrophils 4.2 1.6 - 8.3 10e3/uL    Absolute Lymphocytes 1.3 0.8 - 5.3 10e3/uL    Absolute Monocytes 1.2 0.0 - 1.3 10e3/uL    Absolute Eosinophils 0.0 0.0 - 0.7 10e3/uL    Absolute Basophils 0.1 0.0 - 0.2 10e3/uL    Absolute Immature Granulocytes 0.2 <=0.4 10e3/uL    Absolute NRBCs 0.0 10e3/uL

## 2022-02-05 LAB
ALBUMIN SERPL-MCNC: 2.5 G/DL (ref 3.4–5)
ALBUMIN UR-MCNC: NEGATIVE MG/DL
ALP SERPL-CCNC: 156 U/L (ref 40–150)
ALT SERPL W P-5'-P-CCNC: 23 U/L (ref 0–70)
ANION GAP SERPL CALCULATED.3IONS-SCNC: 4 MMOL/L (ref 3–14)
APPEARANCE UR: CLEAR
AST SERPL W P-5'-P-CCNC: 14 U/L (ref 0–45)
BASOPHILS # BLD AUTO: 0.1 10E3/UL (ref 0–0.2)
BASOPHILS NFR BLD AUTO: 1 %
BILIRUB SERPL-MCNC: 0.2 MG/DL (ref 0.2–1.3)
BILIRUB UR QL STRIP: NEGATIVE
BUN SERPL-MCNC: 14 MG/DL (ref 7–30)
CALCIUM SERPL-MCNC: 8.8 MG/DL (ref 8.5–10.1)
CHLORIDE BLD-SCNC: 111 MMOL/L (ref 94–109)
CO2 SERPL-SCNC: 26 MMOL/L (ref 20–32)
COLOR UR AUTO: ABNORMAL
CREAT SERPL-MCNC: 0.96 MG/DL (ref 0.66–1.25)
EOSINOPHIL # BLD AUTO: 0.1 10E3/UL (ref 0–0.7)
EOSINOPHIL NFR BLD AUTO: 1 %
ERYTHROCYTE [DISTWIDTH] IN BLOOD BY AUTOMATED COUNT: 18.1 % (ref 10–15)
GFR SERPL CREATININE-BSD FRML MDRD: 83 ML/MIN/1.73M2
GLUCOSE BLD-MCNC: 93 MG/DL (ref 70–99)
GLUCOSE UR STRIP-MCNC: NEGATIVE MG/DL
HCT VFR BLD AUTO: 26.5 % (ref 40–53)
HGB BLD-MCNC: 8.1 G/DL (ref 13.3–17.7)
HGB UR QL STRIP: NEGATIVE
IMM GRANULOCYTES # BLD: 0.1 10E3/UL
IMM GRANULOCYTES NFR BLD: 2 %
KETONES UR STRIP-MCNC: 40 MG/DL
LEUKOCYTE ESTERASE UR QL STRIP: NEGATIVE
LYMPHOCYTES # BLD AUTO: 1 10E3/UL (ref 0.8–5.3)
LYMPHOCYTES NFR BLD AUTO: 14 %
MAGNESIUM SERPL-MCNC: 2.1 MG/DL (ref 1.8–2.6)
MCH RBC QN AUTO: 29.8 PG (ref 26.5–33)
MCHC RBC AUTO-ENTMCNC: 30.6 G/DL (ref 31.5–36.5)
MCV RBC AUTO: 97 FL (ref 78–100)
MONOCYTES # BLD AUTO: 1.1 10E3/UL (ref 0–1.3)
MONOCYTES NFR BLD AUTO: 16 %
NEUTROPHILS # BLD AUTO: 4.5 10E3/UL (ref 1.6–8.3)
NEUTROPHILS NFR BLD AUTO: 66 %
NITRATE UR QL: NEGATIVE
NRBC # BLD AUTO: 0 10E3/UL
NRBC BLD AUTO-RTO: 0 /100
PH UR STRIP: 7 [PH] (ref 5–7)
PHOSPHATE SERPL-MCNC: 2.7 MG/DL (ref 2.5–4.5)
PLATELET # BLD AUTO: 219 10E3/UL (ref 150–450)
POTASSIUM BLD-SCNC: 3.4 MMOL/L (ref 3.4–5.3)
POTASSIUM BLD-SCNC: 3.4 MMOL/L (ref 3.4–5.3)
POTASSIUM BLD-SCNC: 3.7 MMOL/L (ref 3.4–5.3)
PROT SERPL-MCNC: 5.8 G/DL (ref 6.8–8.8)
RBC # BLD AUTO: 2.72 10E6/UL (ref 4.4–5.9)
SODIUM SERPL-SCNC: 141 MMOL/L (ref 133–144)
SP GR UR STRIP: 1.01 (ref 1–1.03)
UROBILINOGEN UR STRIP-MCNC: NORMAL MG/DL
WBC # BLD AUTO: 6.8 10E3/UL (ref 4–11)

## 2022-02-05 PROCEDURE — 250N000011 HC RX IP 250 OP 636: Performed by: PHYSICIAN ASSISTANT

## 2022-02-05 PROCEDURE — 99233 SBSQ HOSP IP/OBS HIGH 50: CPT | Mod: FS | Performed by: STUDENT IN AN ORGANIZED HEALTH CARE EDUCATION/TRAINING PROGRAM

## 2022-02-05 PROCEDURE — 85025 COMPLETE CBC W/AUTO DIFF WBC: CPT | Performed by: PHYSICIAN ASSISTANT

## 2022-02-05 PROCEDURE — 258N000003 HC RX IP 258 OP 636: Performed by: PHYSICIAN ASSISTANT

## 2022-02-05 PROCEDURE — 250N000013 HC RX MED GY IP 250 OP 250 PS 637: Performed by: PHYSICIAN ASSISTANT

## 2022-02-05 PROCEDURE — 80053 COMPREHEN METABOLIC PANEL: CPT | Performed by: PHYSICIAN ASSISTANT

## 2022-02-05 PROCEDURE — 84100 ASSAY OF PHOSPHORUS: CPT | Performed by: PHYSICIAN ASSISTANT

## 2022-02-05 PROCEDURE — 84132 ASSAY OF SERUM POTASSIUM: CPT | Performed by: STUDENT IN AN ORGANIZED HEALTH CARE EDUCATION/TRAINING PROGRAM

## 2022-02-05 PROCEDURE — 83735 ASSAY OF MAGNESIUM: CPT | Performed by: PHYSICIAN ASSISTANT

## 2022-02-05 PROCEDURE — 36591 DRAW BLOOD OFF VENOUS DEVICE: CPT | Performed by: STUDENT IN AN ORGANIZED HEALTH CARE EDUCATION/TRAINING PROGRAM

## 2022-02-05 PROCEDURE — 120N000002 HC R&B MED SURG/OB UMMC

## 2022-02-05 PROCEDURE — 250N000013 HC RX MED GY IP 250 OP 250 PS 637: Performed by: STUDENT IN AN ORGANIZED HEALTH CARE EDUCATION/TRAINING PROGRAM

## 2022-02-05 PROCEDURE — 36591 DRAW BLOOD OFF VENOUS DEVICE: CPT | Performed by: PHYSICIAN ASSISTANT

## 2022-02-05 PROCEDURE — 81003 URINALYSIS AUTO W/O SCOPE: CPT | Performed by: PEDIATRICS

## 2022-02-05 RX ORDER — POTASSIUM CHLORIDE 750 MG/1
20 TABLET, EXTENDED RELEASE ORAL ONCE
Status: COMPLETED | OUTPATIENT
Start: 2022-02-05 | End: 2022-02-05

## 2022-02-05 RX ADMIN — ENOXAPARIN SODIUM 40 MG: 40 INJECTION SUBCUTANEOUS at 20:32

## 2022-02-05 RX ADMIN — POTASSIUM CHLORIDE 20 MEQ: 750 TABLET, EXTENDED RELEASE ORAL at 08:56

## 2022-02-05 RX ADMIN — ONDANSETRON HYDROCHLORIDE 8 MG: 8 TABLET, FILM COATED ORAL at 15:00

## 2022-02-05 RX ADMIN — TRIAMCINOLONE ACETONIDE: 1 CREAM TOPICAL at 20:32

## 2022-02-05 RX ADMIN — TRIAMCINOLONE ACETONIDE: 1 CREAM TOPICAL at 08:59

## 2022-02-05 RX ADMIN — Medication 25 MCG: at 08:57

## 2022-02-05 RX ADMIN — NYSTATIN 500000 UNITS: 100000 SUSPENSION ORAL at 14:56

## 2022-02-05 RX ADMIN — MESNA 2510 MG: 100 INJECTION, SOLUTION INTRAVENOUS at 00:10

## 2022-02-05 RX ADMIN — NYSTATIN 500000 UNITS: 100000 SUSPENSION ORAL at 08:59

## 2022-02-05 RX ADMIN — POTASSIUM CHLORIDE 20 MEQ: 750 TABLET, EXTENDED RELEASE ORAL at 14:56

## 2022-02-05 RX ADMIN — LEVOTHYROXINE SODIUM 75 MCG: 0.05 TABLET ORAL at 08:57

## 2022-02-05 RX ADMIN — HYDROCORTISONE 20 MG: 10 TABLET ORAL at 08:57

## 2022-02-05 RX ADMIN — OLANZAPINE 5 MG: 5 TABLET, FILM COATED ORAL at 23:43

## 2022-02-05 RX ADMIN — ONDANSETRON HYDROCHLORIDE 8 MG: 8 TABLET, FILM COATED ORAL at 08:57

## 2022-02-05 RX ADMIN — HYDROCORTISONE 10 MG: 10 TABLET ORAL at 14:56

## 2022-02-05 RX ADMIN — METRONIDAZOLE: 7.5 GEL TOPICAL at 08:59

## 2022-02-05 RX ADMIN — NYSTATIN 500000 UNITS: 100000 SUSPENSION ORAL at 20:32

## 2022-02-05 RX ADMIN — METRONIDAZOLE: 7.5 GEL TOPICAL at 20:32

## 2022-02-05 RX ADMIN — PANTOPRAZOLE SODIUM 40 MG: 40 TABLET, DELAYED RELEASE ORAL at 08:56

## 2022-02-05 RX ADMIN — ONDANSETRON HYDROCHLORIDE 8 MG: 8 TABLET, FILM COATED ORAL at 00:11

## 2022-02-05 RX ADMIN — FOSAPREPITANT 150 MG: 150 INJECTION, POWDER, LYOPHILIZED, FOR SOLUTION INTRAVENOUS at 23:46

## 2022-02-05 RX ADMIN — ACETAMINOPHEN 1000 MG: 500 TABLET ORAL at 14:56

## 2022-02-05 RX ADMIN — ONDANSETRON HYDROCHLORIDE 8 MG: 8 TABLET, FILM COATED ORAL at 23:44

## 2022-02-05 ASSESSMENT — ACTIVITIES OF DAILY LIVING (ADL)
ADLS_ACUITY_SCORE: 5

## 2022-02-05 ASSESSMENT — MIFFLIN-ST. JEOR: SCORE: 1337.63

## 2022-02-05 NOTE — PLAN OF CARE
[id number]  VSS. Alert and oriented. No complaints of pain. Nausea reported. PRN ativan given last evening, and scheduled zofran given. Dose #3 Ifos/Mesna currently running.

## 2022-02-05 NOTE — PROGRESS NOTES
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     Today:  - D4 Doxil/Ifos; tolerating well apart from mild nausea. Tentative discharge 2/9 barring any complications.  - Will give additional dose of Emend to stay on top of CINV; continue supportive cares for nausea.   - Will continue to consider starting Lexapro during this admission; pt reports mood improved today. Health psych plans to visit patient 2/7.   - Continue nystatin for thrush.    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 Wolff. Patient presented to his PCP in 03/2021 with chest/left shoulder and back pain that led to imaging which revealed multiple lung mets, included a left pleural mass. There were difficulties in getting diagnostic biopsy; eventually, biopsy of the mediastinal mass (5/20/21) revealed a poorly characterized malignant spindle cell neoplasm with high PD-L1 expression (90%), Ki-67 70%. 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 Insufficient material to send [facility]. Baseline imaging (6/21/21) revealed progression of lung mets and left-sided subdiaphragmatic mass, stable liver lesions. He was seen by ENT for hoarseness, which was attributed to left true vocal fold motion impairment from mediastinal mass. Given high PD-L1 expression, he was started on Keytruda (C1=6/21/21). Interval imaging (CT 8/2/21) revealed positive response to treatment. He received 6 cycles total, most recently C6=10/4/21. Unfortunately, restaging imaging (10/18/21)  revealed interval increase in size of the LUQ/splenic mass; however, the mediastinal and left pleural mass were stable to slightly decreased in size. He met with Dr. Wolff, who recommended a change in therapy to Doxil/ifosfamide. He was admitted on 10/26/21 to receive Cycle #1 of Doxil/ifos which he tolerated reasonably well with no neurotoxicities, but did have moderate chemotherapy-induced nausea. After discharge he had significant mucositis, poor PO intake, nausea, and lyte derangements. Given adverse effects, Cycle 2 was delayed by 1 week and Doxil was dose reduced to 70 mg, or 40 mg/m2 (previously given 80 mg, or 45mg/m2), and ifosfamide was reduced by 10% (1500mg/m2 ? 1350mg/m2).   - Tolerated Cycle 2 Doxil/Ifos (D1=12/1/21) better, so plan to proceed with same dosing for cycle 3.  - Cycle 3 tolerated well with same dose, plan to proceed with same dose.    - Port placed outpatient on 1/28/22; will access on admission.   - Outpatient team plans to restage with repeat imaging after Cycle 4.                                            Treatment Plan: Doxil + ifosfamide (C4D1=2/2/22).               Doxil 70mg IV once - D1                Ifosfamide/Mesna 1350 mg/m2 (2510 mg) CIVI - D1-6                Mesna 1350 mg/m2 (2510 mg) IV over 18 hrs - starting D7                Pre-meds: Emend 150 mg once Day 1 and Day 4; Zofran 8 mg Q8H                Neulasta injection - D8 - will request @ [facility] on 2/10     # Cancer-related pain  Per patient, pain is much improved from previous and has recently discontinued scheduled celebex.   - Continue PTA topical Bengay PRN at bedtime to left back   - Continue PTA Celebrex 200 mg BID PRN.     # Normocytic anemia  Noted intermittently. Likely related to chemo and underlying disease.   - Transfuse to maintain Hgb >7, will sign blood consent if indicated     CV  # CAD   Followed by Dr. Gigi Vernon at [facility]. He was noted to have coronary calcium on chest CT. CTA  "9/29/21 with moderate mid LAD stenosis. Small to moderate caliber ramus branch with severe diffuse disease. Echo completed 9/29/21 with normal EF and no valvular dysfunction. Repeat ECHO on 10/27 with EF 60-65%, early diastolic dysfunction but otherwise no change from previous. Previously has been on rosuvastatin 40 mg daily, however this was recently held due to transaminitis  - Encourage outpatient cardiology follow-up as recommended     # H/o intermittent elevated blood pressures   BPs ranging from normotensive to hypertensive (with SBP 140s-160) during previous admissions. He states that he runs higher at baseline (though this is based on clinic assessments as he does not typically monitor his BP at home). Appears recent intermittent clinic BPs sit at 140s-150s/80s-90s when seen in person, otherwise many of the visits are currently virtual. He is not on any antihypertensive medications PTA. Suspect he has undiagnosed essential HTN. Will monitor closely this admission.    - Would recommend outpatient PCP follow-up for further discussion of BP monitoring and management.   - Oral hydralazine available PRN (SBP >170, DBP >100)     GI  # Chemotherapy-induced nausea  Improved C2 with the following regimen, plan to continue this admission. Per patient, nausea typically \"kicks in\" around day 3 or 4.   - Scheduled Zofran Q8H  - PRN compazine and Ativan  - Emend 150 mg IV x1 on D1  - Zyprexa 5 mg at bedtime which has been helpful in past      # Transaminitis, resolved  First noted in late Nov: , , TBili WNL on 11/29/21. PTA statin was subsequently held. Per outpatient notes, he did not have any abdominal pain or other symptoms concerning for viral hepatitis. Improved/resolved with holding PTA statin - which has been held on admission. He also has known liver metastases which are likely contributing.   - Okay to continue to hold PTA statin  - Follow LFTs daily     # GERD  - Continue PTA omeprazole  - TUMS and " Pepto Bismol available PRN      RENAL/FEN  # Poor PO intake  Receives 2x weekly IVF outpatient due to poor PO intake with chemotherapy cycles. Was notably hypervolemic with last cycle, so will carefully monitor with cycle 4. Cr 0.97 on 1/31 (baseline ~0.8), so will assess kidney function on admission and bolus PRN.   - Follow I/Os, daily weights.  - Trend daily CMP.   - No mIVF at this time; bolus PRN.      # Hypophosphatemia, intermittent  # Hypokalemia, intermittent  Secondary to ifosfamide. Required additional phos and K replacement after recent discharge from  doxil/Ifos. Most recent labs on 1/31 notable for phos 2.5, K 3.5.  - Check Phos, K on admission and assess need for replacement.  - Continue PTA PO Phos and KCl   - Replete additional per standing protocol     ENDO  # Panhypopituitarism  # Macroadenoma of the pituitary gland  Diagnosed in 2010. Found to have a pituitary macroadenoma, 1.9 cm in largest dimension. Underwent hypophysectomy on 8/23/10. Subsequent presumptive pituitary deficiency, on empiric meds for adrenal insufficiency and thyroid. Previously followed by Dr. Bill Mccall; now has established care with Dr. Jamir Grant on 11/11/21.  - Continue PTA Levothyroxine  - Continue PTA Hydrocortisone 15 mg qAM and 10 mg qPM  - Continue endocrinology follow-up as scheduled (next scheduled for 5/16/22)     ENT  # Vocal cord dysfunction  Manifested as hoarseness. Seen by ENT (Dr. Kyree Bearden) on 6/21/21 and noted to have left true vocal fold motion impairment from mediastinal mass. Underwent injection of ProLaryn (filler/bulking agent) on 7/1/21, which reportedly has been minimally beneficial. Seen in follow-up by Dr. Bearden on 9/1/21 and noted to have minimal improvement; he was then referred to [facility] ([facility]).   - No acute inpatient management issues  - Continue outpatient ENT follow-up as previously scheduled (next scheduled for 3/10/22)     # Oral candidiasis  # H/o  "mucositis  Significant 2/2 Doxil. Noted after C1 chemo, then better following C2 dose-reduction. On admission patient reports thrush again starting a couple days prior to admission. Restarted Nystatin at home on 1/3. Initially denied any mouth sores though endorses poor taste and tongue coating. On admission, has recently started treatment for oral thrush in the past couple days.   - Continue salt/soda swishes  - Continue Nystatin QID with this cycle  - MMW, Doxepin available PRN   - Consider addition of fluconazole in coming days pending worsening/improvement.     DERM  # History of palmar-plantar erythrodysesthesia (Hand-Foot Syndrome)  # New itching rash on bilateral hips mostly excoriations at this time, resolved  Due to Doxil. Noted following C2.  - Iced hands and mouth during Doxil infusion, tolerated well   - Will continue this practice with subsequent cycles  - Vaseline on rash on hips     # Rosacea  - Continue PTA metronidazole gel     PSYCH  # History of anxiety/depression  Situational, related to malaise/illness. Worsening per patient as of past few weeks. Discussed with patient on 2/4 surrounding ongoing mood changes with feeling increasingly \"down\" recently. Patient would like to see health psychology (consult placed) and would consider starting a medication. Plan to visit him this PM to discuss starting lexapro 10 mg, however patient was sleeping comfortably, so will defer to tomorrow AM. Add on TSH.  - Health Pysch consulted; appreciate input      FEN:  - Encouraged PO fluids, bolus PRN  - PRN lyte replacement  - RDAT     Prophy/Misc:  - VTE: ppx Lovenox 40mg daily during admission   - GI/PUD: PTA omeprazole, Tums PRN  - Bowels: Senna and Miralax PRN  - Activity: Consider consulting PT if indicated  - Dry eyes - refresh eye drops     Code: FULL  Disposition: Admitted to Oncology Team 3 service for scheduled chemotherapy 7-day stay. Anticipate discharge ~2/9 pending chemo timing and barring " complications.    Follow-up: Follows with Dr. Wolff   - Twice weekly labs/infusion appts are scheduled at [facility] follow-up visit is scheduled on 2/14  - Will request add on Neulasta to infusion added to already-scheduled infusion      Patient seen and discussed with attending physician, Dr. Martinez.     I spent 40 minutes in the care of this patient today, which included time necessary for review of interval events, obtaining history and physical exam, ordering medications/tests/procedures as medically indicated, review of pertinent medical literature, counseling of the patient, coordination of care, and documentation time. Over 50% of time was spent counseling the patient and/or coordinating care.    Jessica Yao PA-C  Hematology/Oncology  Pager #[phone]      Interval History   Overnight no acute events. Ifrah is overall feeling better compared to yesterday. He was able to sleep well overnight and he reports his nausea is doing better compared to yesterday. Briefly, asked about mood and depression. He reports that yesterday he was feeling down but his mood is doing much better today. No further questions at this time.     A comprehensive review of symptoms was obtained and negative unless noted above.    Physical Exam   Temp: 98.3  F (36.8  C) Temp src: Temporal BP: (!) 167/96 Pulse: 101   Resp: 17 SpO2: 100 % O2 Device: None (Room air)    Vitals:    02/03/22 0730 02/04/22 0723 02/05/22 0801   Weight: 65.6 kg (144 lb 9.6 oz) 64.5 kg (142 lb 3.2 oz) 63.4 kg (139 lb 12.4 oz)     Vital Signs with Ranges  Temp:  [97  F (36.1  C)-98.4  F (36.9  C)] 98.3  F (36.8  C)  Pulse:  [] 101  Resp:  [16-18] 17  BP: (123-167)/(66-96) 167/96  SpO2:  [98 %-100 %] 100 %  I/O last 3 completed shifts:  In: 1292.8 [P.O.:720; I.V.:10; IV Piggyback:562.8]  Out: 1990 [Urine:1990]  Constitutional: Pleasant gentleman seen laying in bed. No apparent distress, and appears stated age.  Eyes: Lids and lashes normal, sclera  clear, conjunctiva normal.  ENT: Chronic hemifacial spasm. Normocephalic, without obvious abnormality, atraumatic, oral pharynx with moist mucus membranes. Scattered white spots.   Respiratory: No increased work of breathing, good air exchange, clear to auscultation bilaterally, no crackles or wheezing.  Cardiovascular: Regular rate and rhythm, normal S1 and S2, and no murmur noted.  GI: No masses or scars. +BS. Soft. No tenderness on palpation.  Skin: No bruising or bleeding appreciated on limited exam. Normal skin color, texture and turgor without redness, warmth, or swelling. No jaundice.   Extremities: There is no redness, warmth, or swelling of the joints. Trace - 1+ lower extremity edema. No cyanosis.   Neurologic: Awake, alert, oriented to name, place and time.    Vascular access: Port    Medications     - MEDICATION INSTRUCTIONS -         Chemotherapy Infusing-Continuous Infusion   Does not apply Q8H     [START ON 2/9/2022] dextrose 5% water  10-20 mL Intracatheter Daily at 8 pm     [START ON 2/9/2022] dextrose 5% water  10-20 mL Intracatheter Daily at 8 pm     enoxaparin ANTICOAGULANT  40 mg Subcutaneous Q24H     [START ON 2/9/2022] filgrastim (NEUPOGEN/GRANIX) intravenous  5 mcg/kg (Treatment Plan Recorded) Intravenous Daily at 8 pm     hydrocortisone  10 mg Oral Daily     hydrocortisone  20 mg Oral Daily     ifosfamide (IFEX) infusion  1,350 mg/m2 (Treatment Plan Recorded) Intravenous Q24H     levothyroxine  75 mcg Oral QAM     [START ON 2/8/2022] mesna (MESNEX) infusion  1,350 mg/m2 (Treatment Plan Recorded) Intravenous Once     metroNIDAZOLE   Topical BID     [Held by provider] metroNIDAZOLE   Topical Daily     nystatin  500,000 Units Oral 4x Daily     OLANZapine  5 mg Oral At Bedtime     ondansetron  8 mg Oral Q8H     pantoprazole  40 mg Oral Daily     [Held by provider] phosphorus tablet 250 mg  500 mg Oral Daily     [Held by provider] potassium chloride ER  20 mEq Oral Daily     triamcinolone    Topical BID     Vitamin D3  25 mcg Oral Daily     White Petrolatum   Topical BID       Data   Results for orders placed or performed during the hospital encounter of 02/02/22 (from the past 24 hour(s))   Potassium   Result Value Ref Range    Potassium 3.4 3.4 - 5.3 mmol/L   TSH with free T4 reflex   Result Value Ref Range    TSH 0.21 (L) 0.40 - 4.00 mU/L   T4 free   Result Value Ref Range    Free T4 1.45 0.76 - 1.46 ng/dL   Potassium   Result Value Ref Range    Potassium 3.8 3.4 - 5.3 mmol/L   CBC with platelets differential    Narrative    The following orders were created for panel order CBC with platelets differential.  Procedure                               Abnormality         Status                     ---------                               -----------         ------                     CBC with platelets and d...[[id number]]  Abnormal            Final result                 Please view results for these tests on the individual orders.   Comprehensive metabolic panel   Result Value Ref Range    Sodium 141 133 - 144 mmol/L    Potassium 3.4 3.4 - 5.3 mmol/L    Chloride 111 (H) 94 - 109 mmol/L    Carbon Dioxide (CO2) 26 20 - 32 mmol/L    Anion Gap 4 3 - 14 mmol/L    Urea Nitrogen 14 7 - 30 mg/dL    Creatinine 0.96 0.66 - 1.25 mg/dL    Calcium 8.8 8.5 - 10.1 mg/dL    Glucose 93 70 - 99 mg/dL    Alkaline Phosphatase 156 (H) 40 - 150 U/L    AST 14 0 - 45 U/L    ALT 23 0 - 70 U/L    Protein Total 5.8 (L) 6.8 - 8.8 g/dL    Albumin 2.5 (L) 3.4 - 5.0 g/dL    Bilirubin Total 0.2 0.2 - 1.3 mg/dL    GFR Estimate 83 >60 mL/min/1.73m2   Magnesium   Result Value Ref Range    Magnesium 2.1 1.8 - 2.6 mg/dL   Phosphorus   Result Value Ref Range    Phosphorus 2.7 2.5 - 4.5 mg/dL   CBC with platelets and differential   Result Value Ref Range    WBC Count 6.8 4.0 - 11.0 10e3/uL    RBC Count 2.72 (L) 4.40 - 5.90 10e6/uL    Hemoglobin 8.1 (L) 13.3 - 17.7 g/dL    Hematocrit 26.5 (L) 40.0 - 53.0 %    MCV 97 78 - 100 fL    MCH 29.8 26.5  - 33.0 pg    MCHC 30.6 (L) 31.5 - 36.5 g/dL    RDW 18.1 (H) 10.0 - 15.0 %    Platelet Count 219 150 - 450 10e3/uL    % Neutrophils 66 %    % Lymphocytes 14 %    % Monocytes 16 %    % Eosinophils 1 %    % Basophils 1 %    % Immature Granulocytes 2 %    NRBCs per 100 WBC 0 <1 /100    Absolute Neutrophils 4.5 1.6 - 8.3 10e3/uL    Absolute Lymphocytes 1.0 0.8 - 5.3 10e3/uL    Absolute Monocytes 1.1 0.0 - 1.3 10e3/uL    Absolute Eosinophils 0.1 0.0 - 0.7 10e3/uL    Absolute Basophils 0.1 0.0 - 0.2 10e3/uL    Absolute Immature Granulocytes 0.1 <=0.4 10e3/uL    Absolute NRBCs 0.0 10e3/uL   UA reflex to Microscopic and Culture    Specimen: Urine, Clean Catch   Result Value Ref Range    Color Urine Straw Colorless, Straw, Light Yellow, Yellow    Appearance Urine Clear Clear    Glucose Urine Negative Negative mg/dL    Bilirubin Urine Negative Negative    Ketones Urine 40  (A) Negative mg/dL    Specific Gravity Urine 1.010 1.003 - 1.035    Blood Urine Negative Negative    pH Urine 7.0 5.0 - 7.0    Protein Albumin Urine Negative Negative mg/dL    Urobilinogen Urine Normal Normal, 2.0 mg/dL    Nitrite Urine Negative Negative    Leukocyte Esterase Urine Negative Negative    Narrative    Microscopic not indicated

## 2022-02-05 NOTE — PLAN OF CARE
Nursing Focus: Chemotherapy  D: Positive blood return via PICC. Insertion site is clean/dry/intact, dressing intact with no complaints of pain.  Urine output is recorded in intake in Doc Flowsheet.    I: Premedications given per order (see electronic medical administration record). Dose #3 of Ifos/Mesna started to infuse over 24 hours. Reviewed pt teaching on chemotherapy side effects.  Pt denies need for further teaching. Chemotherapy double checked per protocol by two chemotherapy competent RN's.   A: Tolerating procedure well. Denies nausea and or pain.   P: Continue to monitor urine output and symptoms of nausea. Screen for symptoms of toxicity.

## 2022-02-05 NOTE — PLAN OF CARE
2059-2075:  VSS, afebrile. Dose #2 CIVI chemotherapy continues w/ good blood return. Continues w/ nausea, scheduled zofran continues w/ some relief, pt declined compazine due to sedative properties; continue to monitor. Fair PO intake. Complained of headache this evening, tylenol given w/ some relief provided. Potassium replaced for 3.4, recheck maintained at 3.4, replaced again w/ recheck placed around 2200. Continue to monitor & w/ POC.

## 2022-02-06 LAB
ALBUMIN SERPL-MCNC: 2.5 G/DL (ref 3.4–5)
ALP SERPL-CCNC: 149 U/L (ref 40–150)
ALT SERPL W P-5'-P-CCNC: 21 U/L (ref 0–70)
ANION GAP SERPL CALCULATED.3IONS-SCNC: 5 MMOL/L (ref 3–14)
AST SERPL W P-5'-P-CCNC: 16 U/L (ref 0–45)
BASOPHILS # BLD AUTO: 0 10E3/UL (ref 0–0.2)
BASOPHILS NFR BLD AUTO: 0 %
BILIRUB SERPL-MCNC: 0.5 MG/DL (ref 0.2–1.3)
BUN SERPL-MCNC: 15 MG/DL (ref 7–30)
CALCIUM SERPL-MCNC: 8.7 MG/DL (ref 8.5–10.1)
CHLORIDE BLD-SCNC: 112 MMOL/L (ref 94–109)
CO2 SERPL-SCNC: 24 MMOL/L (ref 20–32)
CREAT SERPL-MCNC: 0.95 MG/DL (ref 0.66–1.25)
EOSINOPHIL # BLD AUTO: 0.1 10E3/UL (ref 0–0.7)
EOSINOPHIL NFR BLD AUTO: 1 %
ERYTHROCYTE [DISTWIDTH] IN BLOOD BY AUTOMATED COUNT: 17.8 % (ref 10–15)
GFR SERPL CREATININE-BSD FRML MDRD: 85 ML/MIN/1.73M2
GLUCOSE BLD-MCNC: 84 MG/DL (ref 70–99)
HCT VFR BLD AUTO: 26.1 % (ref 40–53)
HGB BLD-MCNC: 8.1 G/DL (ref 13.3–17.7)
IMM GRANULOCYTES # BLD: 0.1 10E3/UL
IMM GRANULOCYTES NFR BLD: 1 %
LYMPHOCYTES # BLD AUTO: 0.9 10E3/UL (ref 0.8–5.3)
LYMPHOCYTES NFR BLD AUTO: 14 %
MAGNESIUM SERPL-MCNC: 2 MG/DL (ref 1.8–2.6)
MCH RBC QN AUTO: 30.1 PG (ref 26.5–33)
MCHC RBC AUTO-ENTMCNC: 31 G/DL (ref 31.5–36.5)
MCV RBC AUTO: 97 FL (ref 78–100)
MONOCYTES # BLD AUTO: 1 10E3/UL (ref 0–1.3)
MONOCYTES NFR BLD AUTO: 15 %
NEUTROPHILS # BLD AUTO: 4.5 10E3/UL (ref 1.6–8.3)
NEUTROPHILS NFR BLD AUTO: 69 %
NRBC # BLD AUTO: 0 10E3/UL
NRBC BLD AUTO-RTO: 0 /100
PHOSPHATE SERPL-MCNC: 2 MG/DL (ref 2.5–4.5)
PLATELET # BLD AUTO: 215 10E3/UL (ref 150–450)
POTASSIUM BLD-SCNC: 3.5 MMOL/L (ref 3.4–5.3)
PROT SERPL-MCNC: 5.7 G/DL (ref 6.8–8.8)
RBC # BLD AUTO: 2.69 10E6/UL (ref 4.4–5.9)
SODIUM SERPL-SCNC: 141 MMOL/L (ref 133–144)
WBC # BLD AUTO: 6.5 10E3/UL (ref 4–11)

## 2022-02-06 PROCEDURE — 36591 DRAW BLOOD OFF VENOUS DEVICE: CPT | Performed by: PHYSICIAN ASSISTANT

## 2022-02-06 PROCEDURE — 250N000011 HC RX IP 250 OP 636: Performed by: PHYSICIAN ASSISTANT

## 2022-02-06 PROCEDURE — 84100 ASSAY OF PHOSPHORUS: CPT | Performed by: PHYSICIAN ASSISTANT

## 2022-02-06 PROCEDURE — 250N000013 HC RX MED GY IP 250 OP 250 PS 637: Performed by: PEDIATRICS

## 2022-02-06 PROCEDURE — 83735 ASSAY OF MAGNESIUM: CPT | Performed by: PHYSICIAN ASSISTANT

## 2022-02-06 PROCEDURE — 120N000002 HC R&B MED SURG/OB UMMC

## 2022-02-06 PROCEDURE — 99233 SBSQ HOSP IP/OBS HIGH 50: CPT | Mod: FS | Performed by: STUDENT IN AN ORGANIZED HEALTH CARE EDUCATION/TRAINING PROGRAM

## 2022-02-06 PROCEDURE — 250N000013 HC RX MED GY IP 250 OP 250 PS 637: Performed by: PHYSICIAN ASSISTANT

## 2022-02-06 PROCEDURE — 85025 COMPLETE CBC W/AUTO DIFF WBC: CPT | Performed by: PHYSICIAN ASSISTANT

## 2022-02-06 PROCEDURE — 258N000003 HC RX IP 258 OP 636: Performed by: PHYSICIAN ASSISTANT

## 2022-02-06 PROCEDURE — 80053 COMPREHEN METABOLIC PANEL: CPT | Performed by: PHYSICIAN ASSISTANT

## 2022-02-06 RX ORDER — SIMETHICONE 80 MG
80 TABLET,CHEWABLE ORAL EVERY 6 HOURS PRN
Status: DISCONTINUED | OUTPATIENT
Start: 2022-02-06 | End: 2022-02-08 | Stop reason: HOSPADM

## 2022-02-06 RX ORDER — SUCRALFATE ORAL 1 G/10ML
1 SUSPENSION ORAL 3 TIMES DAILY PRN
Status: DISCONTINUED | OUTPATIENT
Start: 2022-02-06 | End: 2022-02-08 | Stop reason: HOSPADM

## 2022-02-06 RX ORDER — LORAZEPAM 0.5 MG/1
.5-1 TABLET ORAL EVERY 4 HOURS PRN
Status: DISCONTINUED | OUTPATIENT
Start: 2022-02-06 | End: 2022-02-08 | Stop reason: HOSPADM

## 2022-02-06 RX ADMIN — NYSTATIN 500000 UNITS: 100000 SUSPENSION ORAL at 00:13

## 2022-02-06 RX ADMIN — HYDROCORTISONE 10 MG: 10 TABLET ORAL at 14:08

## 2022-02-06 RX ADMIN — NYSTATIN 500000 UNITS: 100000 SUSPENSION ORAL at 08:53

## 2022-02-06 RX ADMIN — MESNA 2510 MG: 100 INJECTION, SOLUTION INTRAVENOUS at 00:19

## 2022-02-06 RX ADMIN — ONDANSETRON HYDROCHLORIDE 8 MG: 8 TABLET, FILM COATED ORAL at 16:06

## 2022-02-06 RX ADMIN — HYDROCORTISONE 20 MG: 10 TABLET ORAL at 08:53

## 2022-02-06 RX ADMIN — ONDANSETRON HYDROCHLORIDE 8 MG: 8 TABLET, FILM COATED ORAL at 23:38

## 2022-02-06 RX ADMIN — SUCRALFATE 1 G: 1 SUSPENSION ORAL at 12:46

## 2022-02-06 RX ADMIN — SIMETHICONE 80 MG: 80 TABLET, CHEWABLE ORAL at 12:46

## 2022-02-06 RX ADMIN — METRONIDAZOLE: 7.5 GEL TOPICAL at 20:46

## 2022-02-06 RX ADMIN — SODIUM PHOSPHATE, DIBASIC, ANHYDROUS, POTASSIUM PHOSPHATE, MONOBASIC, AND SODIUM PHOSPHATE, MONOBASIC, MONOHYDRATE 500 MG: 852; 155; 130 TABLET, COATED ORAL at 08:53

## 2022-02-06 RX ADMIN — ONDANSETRON HYDROCHLORIDE 8 MG: 8 TABLET, FILM COATED ORAL at 12:27

## 2022-02-06 RX ADMIN — NYSTATIN 500000 UNITS: 100000 SUSPENSION ORAL at 16:06

## 2022-02-06 RX ADMIN — ONDANSETRON HYDROCHLORIDE 8 MG: 8 TABLET, FILM COATED ORAL at 08:53

## 2022-02-06 RX ADMIN — LORAZEPAM 0.5 MG: 0.5 TABLET ORAL at 17:50

## 2022-02-06 RX ADMIN — MESNA 2510 MG: 100 INJECTION, SOLUTION INTRAVENOUS at 23:24

## 2022-02-06 RX ADMIN — LEVOTHYROXINE SODIUM 75 MCG: 0.05 TABLET ORAL at 06:37

## 2022-02-06 RX ADMIN — TRIAMCINOLONE ACETONIDE: 1 CREAM TOPICAL at 08:52

## 2022-02-06 RX ADMIN — Medication 25 MCG: at 08:53

## 2022-02-06 RX ADMIN — PANTOPRAZOLE SODIUM 40 MG: 40 TABLET, DELAYED RELEASE ORAL at 08:53

## 2022-02-06 RX ADMIN — NYSTATIN 500000 UNITS: 100000 SUSPENSION ORAL at 12:28

## 2022-02-06 RX ADMIN — OLANZAPINE 5 MG: 5 TABLET, FILM COATED ORAL at 23:38

## 2022-02-06 RX ADMIN — METRONIDAZOLE: 7.5 GEL TOPICAL at 08:52

## 2022-02-06 RX ADMIN — WHITE PETROLATUM: 1 OINTMENT TOPICAL at 08:55

## 2022-02-06 RX ADMIN — LORAZEPAM 0.5 MG: 0.5 TABLET ORAL at 12:27

## 2022-02-06 RX ADMIN — CALCIUM CARBONATE (ANTACID) CHEW TAB 500 MG 500 MG: 500 CHEW TAB at 12:46

## 2022-02-06 RX ADMIN — TRIAMCINOLONE ACETONIDE: 1 CREAM TOPICAL at 20:46

## 2022-02-06 RX ADMIN — ENOXAPARIN SODIUM 40 MG: 40 INJECTION SUBCUTANEOUS at 20:46

## 2022-02-06 RX ADMIN — NYSTATIN 500000 UNITS: 100000 SUSPENSION ORAL at 20:46

## 2022-02-06 ASSESSMENT — ACTIVITIES OF DAILY LIVING (ADL)
ADLS_ACUITY_SCORE: 5

## 2022-02-06 ASSESSMENT — MIFFLIN-ST. JEOR: SCORE: 1339.12

## 2022-02-06 NOTE — PLAN OF CARE
1900 - 0700  VS stable, afebrile. Pt denied pain/SOB/n/v. CIVI Ifos/Mesna infusing, good blood return noted. Emend infused as scheduled. Potassium recheck 3.7, no interventions necessary, next recheck in AM. Pt rested comfortably throughout night, no significant events, continue POC.

## 2022-02-06 NOTE — PLAN OF CARE
[id number]  C4 D4 Doxil/Ifos. AVSS. Denied pain. C/O intermittent nausea, relieved with scheduled Zofran. Up ad kaitlynn in room. Fair po intake. Adequate urine output. UA obtained and sent to lab. BM today. Potassium replaced x 2, recheck ordered for 1930. No acute events. Continue with plan of care.

## 2022-02-06 NOTE — PROGRESS NOTES
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     Today:  - D5 Doxil/Ifos; patient is struggling with nausea. Tentative discharge 2/9 barring any complications.  - Closely monitor for s/sx of neurotoxicity; pt reporting head \"whirling\" and mood changes. No current indication to hold Ifosfamide at this time.   - Continue nystatin for thrush.    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 Wolff. Patient presented to his PCP in 03/2021 with chest/left shoulder and back pain that led to imaging which revealed multiple lung mets, included a left pleural mass. There were difficulties in getting diagnostic biopsy; eventually, biopsy of the mediastinal mass (5/20/21) revealed a poorly characterized malignant spindle cell neoplasm with high PD-L1 expression (90%), Ki-67 70%. 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 Insufficient material to send [facility]. Baseline imaging (6/21/21) revealed progression of lung mets and left-sided subdiaphragmatic mass, stable liver lesions. He was seen by ENT for hoarseness, which was attributed to left true vocal fold motion impairment from mediastinal mass. Given high PD-L1 expression, he was started on Keytruda (C1=6/21/21). Interval imaging (CT 8/2/21) revealed positive response to treatment. He received 6 cycles total, most recently C6=10/4/21. Unfortunately, restaging imaging (10/18/21) revealed interval increase in size of the LUQ/splenic mass; however, the mediastinal and left pleural " "mass were stable to slightly decreased in size. He met with Dr. Wolff, who recommended a change in therapy to Doxil/ifosfamide. He was admitted on 10/26/21 to receive Cycle #1 of Doxil/ifos which he tolerated reasonably well with no neurotoxicities, but did have moderate chemotherapy-induced nausea. After discharge he had significant mucositis, poor PO intake, nausea, and lyte derangements. Given adverse effects, Cycle 2 was delayed by 1 week and Doxil was dose reduced to 70 mg, or 40 mg/m2 (previously given 80 mg, or 45mg/m2), and ifosfamide was reduced by 10% (1500mg/m2 ? 1350mg/m2).   - Tolerated Cycle 2 Doxil/Ifos (D1=12/1/21) better, so plan to proceed with same dosing for cycle 3.  - Cycle 3 tolerated well with same dose, plan to proceed with same dose.    - Port placed outpatient on 1/28/22; will access on admission.   - Outpatient team plans to restage with repeat imaging after Cycle 4.  - Of note, patient struggled immensely with CINV despite having scheduled antiemetics. He endorses feeling like his head is \"whirling\", fluctuating mood changes, and slowed thought process. He also endorses hand tremors. This has been consistent with previous cycles but I am concerned he may continue to develop worsening toxicities. Will monitor very closely.                                              Treatment Plan: Doxil + ifosfamide (C4D1=2/2/22).               Doxil 70mg IV once - D1                Ifosfamide/Mesna 1350 mg/m2 (2510 mg) CIVI - D1-6                Mesna 1350 mg/m2 (2510 mg) IV over 18 hrs - starting D7                Pre-meds: Emend 150 mg once Day 1 and Day 4; Zofran 8 mg Q8H                Neulasta injection - D8 - will request @ [facility] on 2/10     # Cancer-related pain  Per patient, pain is much improved from previous and has recently discontinued scheduled celebex.   - Continue PTA topical Bengay PRN at bedtime to left back   - Continue PTA Celebrex 200 mg BID PRN.     # Normocytic anemia  Noted " "intermittently. Likely related to chemo and underlying disease.   - Transfuse to maintain Hgb >7, will sign blood consent if indicated     CV  # CAD   Followed by Dr. Gigi Vernon at [facility]. He was noted to have coronary calcium on chest CT. CTA 9/29/21 with moderate mid LAD stenosis. Small to moderate caliber ramus branch with severe diffuse disease. Echo completed 9/29/21 with normal EF and no valvular dysfunction. Repeat ECHO on 10/27 with EF 60-65%, early diastolic dysfunction but otherwise no change from previous. Previously has been on rosuvastatin 40 mg daily, however this was recently held due to transaminitis  - Encourage outpatient cardiology follow-up as recommended     # H/o intermittent elevated blood pressures   BPs ranging from normotensive to hypertensive (with SBP 140s-160) during previous admissions. He states that he runs higher at baseline (though this is based on clinic assessments as he does not typically monitor his BP at home). Appears recent intermittent clinic BPs sit at 140s-150s/80s-90s when seen in person, otherwise many of the visits are currently virtual. He is not on any antihypertensive medications PTA. Suspect he has undiagnosed essential HTN. Will monitor closely this admission.    - Would recommend outpatient PCP follow-up for further discussion of BP monitoring and management.   - Oral hydralazine available PRN (SBP >170, DBP >100)     GI  # Chemotherapy-induced nausea  Improved C2 with the following regimen, plan to continue this admission. Per patient, nausea typically \"kicks in\" around day 3 or 4.   - Scheduled Zofran Q8H  - PRN compazine and Ativan  - Emend 150 mg IV x1 on D1, repeat dose D4   - Zyprexa 5 mg at bedtime which has been helpful in past      # Transaminitis, resolved  First noted in late Nov: , , TBili WNL on 11/29/21. PTA statin was subsequently held. Per outpatient notes, he did not have any abdominal pain or other symptoms " concerning for viral hepatitis. Improved/resolved with holding PTA statin - which has been held on admission. He also has known liver metastases which are likely contributing.   - Okay to continue to hold PTA statin  - Follow LFTs daily     # GERD  - Continue PTA omeprazole  - TUMS and Pepto Bismol available PRN      RENAL/FEN  # Poor PO intake  Receives 2x weekly IVF outpatient due to poor PO intake with chemotherapy cycles. Was notably hypervolemic with last cycle, so will carefully monitor with cycle 4. Cr 0.97 on 1/31 (baseline ~0.8), so will assess kidney function on admission and bolus PRN.   - Follow I/Os, daily weights.  - Trend daily CMP.   - No mIVF at this time; bolus PRN.      # Hypophosphatemia, intermittent  # Hypokalemia, intermittent  Secondary to ifosfamide. Required additional phos and K replacement after recent discharge from  doxil/Ifos. Most recent labs on 1/31 notable for phos 2.5, K 3.5.  - Check Phos, K on admission and assess need for replacement.  - Continue PTA PO Phos and KCl   - Replete additional per standing protocol     ENDO  # Panhypopituitarism  # Macroadenoma of the pituitary gland  Diagnosed in 2010. Found to have a pituitary macroadenoma, 1.9 cm in largest dimension. Underwent hypophysectomy on 8/23/10. Subsequent presumptive pituitary deficiency, on empiric meds for adrenal insufficiency and thyroid. Previously followed by Dr. Bill Mccall; now has established care with Dr. Jamir Grant on 11/11/21.  - Continue PTA Levothyroxine  - Continue PTA Hydrocortisone 15 mg qAM and 10 mg qPM  - Continue endocrinology follow-up as scheduled (next scheduled for 5/16/22)     ENT  # Vocal cord dysfunction  Manifested as hoarseness. Seen by ENT (Dr. Kyree Bearden) on 6/21/21 and noted to have left true vocal fold motion impairment from mediastinal mass. Underwent injection of ProLaryn (filler/bulking agent) on 7/1/21, which reportedly has been minimally beneficial. Seen in follow-up by  "Dr. Bearden on 9/1/21 and noted to have minimal improvement; he was then referred to the [facility] ([facility]).   - No acute inpatient management issues  - Continue outpatient ENT follow-up as previously scheduled (next scheduled for 3/10/22)     # Oral candidiasis  # H/o mucositis  Significant 2/2 Doxil. Noted after C1 chemo, then better following C2 dose-reduction. On admission patient reports thrush again starting a couple days prior to admission. Restarted Nystatin at home on 1/3. Initially denied any mouth sores though endorses poor taste and tongue coating. On admission, has recently started treatment for oral thrush in the past couple days.   - Continue salt/soda swishes  - Continue Nystatin QID with this cycle  - MMW, Doxepin available PRN   - Consider addition of fluconazole in coming days pending worsening/improvement.     DERM  # History of palmar-plantar erythrodysesthesia (Hand-Foot Syndrome)  # New itching rash on bilateral hips mostly excoriations at this time, resolved  Due to Doxil. Noted following C2.  - Iced hands and mouth during Doxil infusion, tolerated well   - Will continue this practice with subsequent cycles  - Vaseline on rash on hips     # Rosacea  - Continue PTA metronidazole gel     PSYCH  # History of anxiety/depression  Situational, related to malaise/illness. Worsening per patient as of past few weeks. Discussed with patient on 2/4 surrounding ongoing mood changes with feeling increasingly \"down\" recently. Patient would like to see health psychology (consult placed) and would consider starting a medication. Plan to visit him this PM to discuss starting lexapro 10 mg, however patient was sleeping comfortably, so will defer to tomorrow AM. Add on TSH.  - [facility] consulted; appreciate input      FEN:  - Encouraged PO fluids, bolus PRN  - PRN lyte replacement  - RDAT     Prophy/Misc:  - VTE: ppx Lovenox 40mg daily during admission   - GI/PUD: PTA omeprazole, Tums PRN  - " Bowels: Senna and Miralax PRN  - Activity: Consider consulting PT if indicated  - Dry eyes - refresh eye drops     Code: FULL  Disposition: Admitted to Oncology Team 3 service for scheduled chemotherapy 7-day stay. Anticipate discharge ~2/9 pending chemo timing and barring complications.    Follow-up: Follows with Dr. Wolff   - Twice weekly [facility]infusion [facility]up visit is scheduled on 2/14  - Will request add on Neulasta to infusion added to already-scheduled infusion      Patient seen and discussed with attending physician, Dr. Martinez.     I spent 40 minutes in the care of this patient today, which included time necessary for review of interval events, obtaining history and physical exam, ordering medications/tests/procedures as medically indicated, review of pertinent medical literature, counseling of the patient, coordination of care, and documentation time. Over 50% of time was spent counseling the patient and/or coordinating care.    Jessica Yao PA-C  Hematology/Oncology  Pager #[phone]      Interval History   Overnight no acute events. Ifrah is feeling miserable today. He reports that his nausea is worse and he feels like he is thinking and responding in slow motion. He states this is the typical course for his chemotherapy. Asked patient inform use if this worsens or changes in any way. He voiced understanding. Will continues to optimize support anti emetic cares. All questions answered at this time.     A comprehensive review of symptoms was obtained and negative unless noted above.    Physical Exam   Temp: 98.2  F (36.8  C) Temp src: Axillary BP: (!) 143/82 Pulse: 86   Resp: 18 SpO2: 100 % O2 Device: None (Room air)    Vitals:    02/03/22 0730 02/04/22 0723 02/05/22 0801   Weight: 65.6 kg (144 lb 9.6 oz) 64.5 kg (142 lb 3.2 oz) 63.4 kg (139 lb 12.4 oz)     Vital Signs with Ranges  Temp:  [97.3  F (36.3  C)-98.4  F (36.9  C)] 98.2  F (36.8  C)  Pulse:  [71-86]  86  Resp:  [18] 18  BP: (124-149)/(71-84) 143/82  SpO2:  [98 %-100 %] 100 %  I/O last 3 completed shifts:  In: 817.8 [I.V.:255; IV Piggyback:562.8]  Out: 2150 [Urine:2150]  Constitutional: Pleasant gentleman seen laying in bed.No apparent distress, and appears stated age.  Eyes: Lids and lashes normal, sclera clear, conjunctiva normal.  ENT: Chronic hemifacial spasm. Normocephalic, without obvious abnormality, atraumatic, oral pharynx with moist mucus membranes. Scattered white spots.   Respiratory: No increased work of breathing, good air exchange, clear to auscultation bilaterally, no crackles or wheezing.  Cardiovascular: Regular rate and rhythm, normal S1 and S2, and no murmur noted.  GI: No masses or scars. +BS. Soft. No tenderness on palpation.  Skin: No bruising or bleeding appreciated on limited exam. Normal skin color, texture and turgor without redness, warmth, or swelling. No jaundice.   Extremities: There is no redness, warmth, or swelling of the joints. Trace - 1+ lower extremity edema. No cyanosis.   Neurologic: Awake, alert, oriented to name, place and time.    Vascular access: Port    Medications     - MEDICATION INSTRUCTIONS -         Chemotherapy Infusing-Continuous Infusion   Does not apply Q8H     [START ON 2/9/2022] dextrose 5% water  10-20 mL Intracatheter Daily at 8 pm     [START ON 2/9/2022] dextrose 5% water  10-20 mL Intracatheter Daily at 8 pm     enoxaparin ANTICOAGULANT  40 mg Subcutaneous Q24H     [START ON 2/9/2022] filgrastim (NEUPOGEN/GRANIX) intravenous  5 mcg/kg (Treatment Plan Recorded) Intravenous Daily at 8 pm     hydrocortisone  10 mg Oral Daily     hydrocortisone  20 mg Oral Daily     ifosfamide (IFEX) infusion  1,350 mg/m2 (Treatment Plan Recorded) Intravenous Q24H     levothyroxine  75 mcg Oral QAM     [START ON 2/8/2022] mesna (MESNEX) infusion  1,350 mg/m2 (Treatment Plan Recorded) Intravenous Once     metroNIDAZOLE   Topical BID     [Held by provider] metroNIDAZOLE    Topical Daily     nystatin  500,000 Units Oral 4x Daily     OLANZapine  5 mg Oral At Bedtime     ondansetron  8 mg Oral Q8H     pantoprazole  40 mg Oral Daily     phosphorus tablet 250 mg  500 mg Oral Daily     [Held by provider] potassium chloride ER  20 mEq Oral Daily     triamcinolone   Topical BID     Vitamin D3  25 mcg Oral Daily     White Petrolatum   Topical BID       Data   Results for orders placed or performed during the hospital encounter of 02/02/22 (from the past 24 hour(s))   Potassium   Result Value Ref Range    Potassium 3.4 3.4 - 5.3 mmol/L   Potassium   Result Value Ref Range    Potassium 3.7 3.4 - 5.3 mmol/L   CBC with platelets differential    Narrative    The following orders were created for panel order CBC with platelets differential.  Procedure                               Abnormality         Status                     ---------                               -----------         ------                     CBC with platelets and d...[[id number]]  Abnormal            Final result                 Please view results for these tests on the individual orders.   Comprehensive metabolic panel   Result Value Ref Range    Sodium 141 133 - 144 mmol/L    Potassium 3.5 3.4 - 5.3 mmol/L    Chloride 112 (H) 94 - 109 mmol/L    Carbon Dioxide (CO2) 24 20 - 32 mmol/L    Anion Gap 5 3 - 14 mmol/L    Urea Nitrogen 15 7 - 30 mg/dL    Creatinine 0.95 0.66 - 1.25 mg/dL    Calcium 8.7 8.5 - 10.1 mg/dL    Glucose 84 70 - 99 mg/dL    Alkaline Phosphatase 149 40 - 150 U/L    AST 16 0 - 45 U/L    ALT 21 0 - 70 U/L    Protein Total 5.7 (L) 6.8 - 8.8 g/dL    Albumin 2.5 (L) 3.4 - 5.0 g/dL    Bilirubin Total 0.5 0.2 - 1.3 mg/dL    GFR Estimate 85 >60 mL/min/1.73m2   Magnesium   Result Value Ref Range    Magnesium 2.0 1.8 - 2.6 mg/dL   Phosphorus   Result Value Ref Range    Phosphorus 2.0 (L) 2.5 - 4.5 mg/dL   CBC with platelets and differential   Result Value Ref Range    WBC Count 6.5 4.0 - 11.0 10e3/uL    RBC Count 2.69  (L) 4.40 - 5.90 10e6/uL    Hemoglobin 8.1 (L) 13.3 - 17.7 g/dL    Hematocrit 26.1 (L) 40.0 - 53.0 %    MCV 97 78 - 100 fL    MCH 30.1 26.5 - 33.0 pg    MCHC 31.0 (L) 31.5 - 36.5 g/dL    RDW 17.8 (H) 10.0 - 15.0 %    Platelet Count 215 150 - 450 10e3/uL    % Neutrophils 69 %    % Lymphocytes 14 %    % Monocytes 15 %    % Eosinophils 1 %    % Basophils 0 %    % Immature Granulocytes 1 %    NRBCs per 100 WBC 0 <1 /100    Absolute Neutrophils 4.5 1.6 - 8.3 10e3/uL    Absolute Lymphocytes 0.9 0.8 - 5.3 10e3/uL    Absolute Monocytes 1.0 0.0 - 1.3 10e3/uL    Absolute Eosinophils 0.1 0.0 - 0.7 10e3/uL    Absolute Basophils 0.0 0.0 - 0.2 10e3/uL    Absolute Immature Granulocytes 0.1 <=0.4 10e3/uL    Absolute NRBCs 0.0 10e3/uL

## 2022-02-06 NOTE — PLAN OF CARE
"[id number]  C4 D5 Doxil/Ifos. Alert and oriented x 4. Flat affect and withdrawn. Wilson states he's feeling blue due to winter and not feeling like himself. Pt feels as though he has \"chemo brain\". Hypertensive, did not meet prn hydralazine orders. Denied pain. C/O continuous nausea, partially relieved with scheduled and prn Zofran and prn Ativan. Severity of nausea increases when not laying flat, no emesis. Up ad kaitlynn in room. Did not eat breakfast of lunch but did eat 75% of dinner (pt stated that he had to force himself to eat r/t nausea and food aversion r/t texture). Pt would benefit from dietician consult. Adequate urine output. LBM 2/5. Phosphorus being replaced orally, recheck with morning labs.     "

## 2022-02-06 NOTE — PLAN OF CARE
Nursing Focus: Chemotherapy  D: Positive blood return via PORT. Insertion site is clean/dry/intact, dressing intact with no complaints of pain.  Urine output is recorded in intake in Doc Flowsheet.    I: Premedications given per order (see electronic medical administration record). Dose #4 Ifos/Mesna started to infuse over 24 hours. Reviewed pt teaching on chemotherapy side effects.  Pt denies need for further teaching. Chemotherapy double checked per protocol by two chemotherapy competent RN's.   A: Tolerating procedure well. Denies nausea and or pain.   P: Continue to monitor urine output and symptoms of nausea. Screen for symptoms of toxicity.

## 2022-02-07 LAB
ALBUMIN SERPL-MCNC: 2.6 G/DL (ref 3.4–5)
ALP SERPL-CCNC: 156 U/L (ref 40–150)
ALT SERPL W P-5'-P-CCNC: 50 U/L (ref 0–70)
ANION GAP SERPL CALCULATED.3IONS-SCNC: 9 MMOL/L (ref 3–14)
AST SERPL W P-5'-P-CCNC: 62 U/L (ref 0–45)
BASOPHILS # BLD AUTO: 0 10E3/UL (ref 0–0.2)
BASOPHILS NFR BLD AUTO: 1 %
BILIRUB SERPL-MCNC: 0.3 MG/DL (ref 0.2–1.3)
BUN SERPL-MCNC: 18 MG/DL (ref 7–30)
CALCIUM SERPL-MCNC: 9 MG/DL (ref 8.5–10.1)
CHLORIDE BLD-SCNC: 112 MMOL/L (ref 94–109)
CO2 SERPL-SCNC: 21 MMOL/L (ref 20–32)
CREAT SERPL-MCNC: 1.09 MG/DL (ref 0.66–1.25)
EOSINOPHIL # BLD AUTO: 0.1 10E3/UL (ref 0–0.7)
EOSINOPHIL NFR BLD AUTO: 1 %
ERYTHROCYTE [DISTWIDTH] IN BLOOD BY AUTOMATED COUNT: 17.3 % (ref 10–15)
GFR SERPL CREATININE-BSD FRML MDRD: 72 ML/MIN/1.73M2
GLUCOSE BLD-MCNC: 78 MG/DL (ref 70–99)
HCT VFR BLD AUTO: 28.5 % (ref 40–53)
HGB BLD-MCNC: 8.8 G/DL (ref 13.3–17.7)
IMM GRANULOCYTES # BLD: 0 10E3/UL
IMM GRANULOCYTES NFR BLD: 1 %
LYMPHOCYTES # BLD AUTO: 1 10E3/UL (ref 0.8–5.3)
LYMPHOCYTES NFR BLD AUTO: 19 %
MAGNESIUM SERPL-MCNC: 1.9 MG/DL (ref 1.8–2.6)
MCH RBC QN AUTO: 30.1 PG (ref 26.5–33)
MCHC RBC AUTO-ENTMCNC: 30.9 G/DL (ref 31.5–36.5)
MCV RBC AUTO: 98 FL (ref 78–100)
MONOCYTES # BLD AUTO: 0.6 10E3/UL (ref 0–1.3)
MONOCYTES NFR BLD AUTO: 11 %
NEUTROPHILS # BLD AUTO: 3.4 10E3/UL (ref 1.6–8.3)
NEUTROPHILS NFR BLD AUTO: 67 %
NRBC # BLD AUTO: 0 10E3/UL
NRBC BLD AUTO-RTO: 0 /100
PHOSPHATE SERPL-MCNC: 2.4 MG/DL (ref 2.5–4.5)
PLATELET # BLD AUTO: 232 10E3/UL (ref 150–450)
POTASSIUM BLD-SCNC: 3.2 MMOL/L (ref 3.4–5.3)
POTASSIUM BLD-SCNC: 3.4 MMOL/L (ref 3.4–5.3)
PROT SERPL-MCNC: 6.1 G/DL (ref 6.8–8.8)
RBC # BLD AUTO: 2.92 10E6/UL (ref 4.4–5.9)
SODIUM SERPL-SCNC: 142 MMOL/L (ref 133–144)
WBC # BLD AUTO: 5.1 10E3/UL (ref 4–11)

## 2022-02-07 PROCEDURE — 36591 DRAW BLOOD OFF VENOUS DEVICE: CPT | Performed by: STUDENT IN AN ORGANIZED HEALTH CARE EDUCATION/TRAINING PROGRAM

## 2022-02-07 PROCEDURE — 120N000002 HC R&B MED SURG/OB UMMC

## 2022-02-07 PROCEDURE — 90834 PSYTX W PT 45 MINUTES: CPT | Performed by: STUDENT IN AN ORGANIZED HEALTH CARE EDUCATION/TRAINING PROGRAM

## 2022-02-07 PROCEDURE — 83735 ASSAY OF MAGNESIUM: CPT | Performed by: PHYSICIAN ASSISTANT

## 2022-02-07 PROCEDURE — 250N000013 HC RX MED GY IP 250 OP 250 PS 637: Performed by: PHYSICIAN ASSISTANT

## 2022-02-07 PROCEDURE — 250N000011 HC RX IP 250 OP 636: Performed by: PHYSICIAN ASSISTANT

## 2022-02-07 PROCEDURE — 80053 COMPREHEN METABOLIC PANEL: CPT | Performed by: PHYSICIAN ASSISTANT

## 2022-02-07 PROCEDURE — 84132 ASSAY OF SERUM POTASSIUM: CPT | Performed by: STUDENT IN AN ORGANIZED HEALTH CARE EDUCATION/TRAINING PROGRAM

## 2022-02-07 PROCEDURE — 85025 COMPLETE CBC W/AUTO DIFF WBC: CPT | Performed by: PHYSICIAN ASSISTANT

## 2022-02-07 PROCEDURE — 36591 DRAW BLOOD OFF VENOUS DEVICE: CPT | Performed by: PHYSICIAN ASSISTANT

## 2022-02-07 PROCEDURE — 84100 ASSAY OF PHOSPHORUS: CPT | Performed by: PHYSICIAN ASSISTANT

## 2022-02-07 PROCEDURE — 99233 SBSQ HOSP IP/OBS HIGH 50: CPT | Mod: FS | Performed by: STUDENT IN AN ORGANIZED HEALTH CARE EDUCATION/TRAINING PROGRAM

## 2022-02-07 PROCEDURE — 258N000003 HC RX IP 258 OP 636: Performed by: PHYSICIAN ASSISTANT

## 2022-02-07 PROCEDURE — 258N000003 HC RX IP 258 OP 636: Performed by: STUDENT IN AN ORGANIZED HEALTH CARE EDUCATION/TRAINING PROGRAM

## 2022-02-07 PROCEDURE — 250N000013 HC RX MED GY IP 250 OP 250 PS 637: Performed by: STUDENT IN AN ORGANIZED HEALTH CARE EDUCATION/TRAINING PROGRAM

## 2022-02-07 PROCEDURE — 250N000011 HC RX IP 250 OP 636: Performed by: STUDENT IN AN ORGANIZED HEALTH CARE EDUCATION/TRAINING PROGRAM

## 2022-02-07 RX ORDER — POTASSIUM CHLORIDE 750 MG/1
20 TABLET, EXTENDED RELEASE ORAL ONCE
Status: COMPLETED | OUTPATIENT
Start: 2022-02-07 | End: 2022-02-07

## 2022-02-07 RX ADMIN — DIBASIC SODIUM PHOSPHATE, MONOBASIC POTASSIUM PHOSPHATE AND MONOBASIC SODIUM PHOSPHATE 500 MG: 852; 155; 130 TABLET ORAL at 19:38

## 2022-02-07 RX ADMIN — Medication 25 MCG: at 08:12

## 2022-02-07 RX ADMIN — NYSTATIN 500000 UNITS: 100000 SUSPENSION ORAL at 12:27

## 2022-02-07 RX ADMIN — ONDANSETRON HYDROCHLORIDE 8 MG: 8 TABLET, FILM COATED ORAL at 23:58

## 2022-02-07 RX ADMIN — POTASSIUM CHLORIDE 20 MEQ: 750 TABLET, EXTENDED RELEASE ORAL at 08:12

## 2022-02-07 RX ADMIN — PROCHLORPERAZINE MALEATE 5 MG: 5 TABLET ORAL at 21:11

## 2022-02-07 RX ADMIN — PANTOPRAZOLE SODIUM 40 MG: 40 TABLET, DELAYED RELEASE ORAL at 08:12

## 2022-02-07 RX ADMIN — LEVOTHYROXINE SODIUM 75 MCG: 0.05 TABLET ORAL at 06:36

## 2022-02-07 RX ADMIN — ONDANSETRON HYDROCHLORIDE 8 MG: 8 TABLET, FILM COATED ORAL at 16:29

## 2022-02-07 RX ADMIN — MESNA 2510 MG: 100 INJECTION, SOLUTION INTRAVENOUS at 10:16

## 2022-02-07 RX ADMIN — METRONIDAZOLE: 7.5 GEL TOPICAL at 08:11

## 2022-02-07 RX ADMIN — NYSTATIN 500000 UNITS: 100000 SUSPENSION ORAL at 08:12

## 2022-02-07 RX ADMIN — NYSTATIN 500000 UNITS: 100000 SUSPENSION ORAL at 19:38

## 2022-02-07 RX ADMIN — HYDROCORTISONE 10 MG: 10 TABLET ORAL at 14:32

## 2022-02-07 RX ADMIN — TRIAMCINOLONE ACETONIDE: 1 CREAM TOPICAL at 08:11

## 2022-02-07 RX ADMIN — ONDANSETRON HYDROCHLORIDE 8 MG: 8 TABLET, FILM COATED ORAL at 08:12

## 2022-02-07 RX ADMIN — ACETAMINOPHEN 1000 MG: 500 TABLET ORAL at 17:02

## 2022-02-07 RX ADMIN — WHITE PETROLATUM: 1 OINTMENT TOPICAL at 08:16

## 2022-02-07 RX ADMIN — SODIUM CHLORIDE, POTASSIUM CHLORIDE, SODIUM LACTATE AND CALCIUM CHLORIDE 1000 ML: 600; 310; 30; 20 INJECTION, SOLUTION INTRAVENOUS at 10:05

## 2022-02-07 RX ADMIN — ENOXAPARIN SODIUM 40 MG: 40 INJECTION SUBCUTANEOUS at 19:38

## 2022-02-07 RX ADMIN — NYSTATIN 500000 UNITS: 100000 SUSPENSION ORAL at 16:29

## 2022-02-07 RX ADMIN — HYDROCORTISONE 20 MG: 10 TABLET ORAL at 08:12

## 2022-02-07 RX ADMIN — OLANZAPINE 5 MG: 5 TABLET, FILM COATED ORAL at 21:51

## 2022-02-07 RX ADMIN — SODIUM PHOSPHATE, DIBASIC, ANHYDROUS, POTASSIUM PHOSPHATE, MONOBASIC, AND SODIUM PHOSPHATE, MONOBASIC, MONOHYDRATE 500 MG: 852; 155; 130 TABLET, COATED ORAL at 08:12

## 2022-02-07 ASSESSMENT — ACTIVITIES OF DAILY LIVING (ADL)
ADLS_ACUITY_SCORE: 7
ADLS_ACUITY_SCORE: 9
ADLS_ACUITY_SCORE: 7
ADLS_ACUITY_SCORE: 5
ADLS_ACUITY_SCORE: 7
ADLS_ACUITY_SCORE: 5
ADLS_ACUITY_SCORE: 9
ADLS_ACUITY_SCORE: 5
ADLS_ACUITY_SCORE: 5
ADLS_ACUITY_SCORE: 7
ADLS_ACUITY_SCORE: 5
ADLS_ACUITY_SCORE: 7
ADLS_ACUITY_SCORE: 7
ADLS_ACUITY_SCORE: 5

## 2022-02-07 ASSESSMENT — MIFFLIN-ST. JEOR: SCORE: 1337.63

## 2022-02-07 NOTE — PROGRESS NOTES
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     Today:  - Day 5 Ifosfamide held today @ 0945 due to concern for neurotoxicity and subsequently started with mesna alone (over 18 hours per protocol). Patient found per nursing on floor of bathroom after he was feeling lightheaded and lowered himself to the ground. Noted to have worsening myoclonic jerks, difficulties with comprehension and mood changes. Significantly improved/resolved symptoms this afternoon on repeat examination after discontinuation of chemotherapy.  - 1L LR with improvement in lightheadness, softer BP.  - Continue nystatin for thrush.     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 Wolff. Patient presented to his PCP in 03/2021 with chest/left shoulder and back pain that led to imaging which revealed multiple lung mets, included a left pleural mass. There were difficulties in getting diagnostic biopsy; eventually, biopsy of the mediastinal mass (5/20/21) revealed a poorly characterized malignant spindle cell neoplasm with high PD-L1 expression (90%), Ki-67 70%. 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 Insufficient material to send [facility]. Baseline imaging (6/21/21) revealed progression of lung mets and left-sided subdiaphragmatic mass, stable liver lesions. He was seen by ENT for hoarseness, which was attributed to left true vocal fold motion impairment from mediastinal mass. Given high PD-L1 expression, he was started on Keytruda  "(C1=6/21/21). Interval imaging (CT 8/2/21) revealed positive response to treatment. He received 6 cycles total, most recently C6=10/4/21. Unfortunately, restaging imaging (10/18/21) revealed interval increase in size of the LUQ/splenic mass; however, the mediastinal and left pleural mass were stable to slightly decreased in size. He met with Dr. Wolff, who recommended a change in therapy to Doxil/ifosfamide. He was admitted on 10/26/21 to receive Cycle #1 of Doxil/ifos which he tolerated reasonably well with no neurotoxicities, but did have moderate chemotherapy-induced nausea. After discharge he had significant mucositis, poor PO intake, nausea, and lyte derangements. Given adverse effects, Cycle 2 was delayed by 1 week and Doxil was dose reduced to 70 mg, or 40 mg/m2 (previously given 80 mg, or 45mg/m2), and ifosfamide was reduced by 10% (1500mg/m2 ? 1350mg/m2).   - Tolerated Cycle 2 Doxil/Ifos (D1=12/1/21) better, so plan to proceed with same dosing for cycle 3.  - Cycle 3 tolerated well with same dose, plan to proceed with same dose.    - Port placed outpatient on 1/28/22; will access on admission.   - Outpatient team plans to restage with repeat imaging after Cycle 4.                                           Treatment Plan: Doxil + ifosfamide (C4D1=2/2/22).               Doxil 70mg IV once - D1                Ifosfamide/Mesna 1350 mg/m2 (2510 mg) CIVI - D1-6     - Completed 5.5 bags of ifosfamide prior to being held for neurotoxicity as below               Mesna 1350 mg/m2 (2510 mg) IV over 18 hrs - starting D7                Pre-meds: Emend 150 mg once Day 1 and Day 4; Zofran 8 mg Q8H                Neulasta injection - D8 - will request @ [facility] on 2/10     # Neurotoxicity, secondary to ifosfamide chemotherapy  # Fall  Mild symptoms noted throughout previous admissions, however this admission notably worsened with symptoms including severe nausea/vomiting, \"spinning\" head, fluctuating mood changes, " "myoclonic jerks and slowed thought process. All noted to be worsening on 2/6, however on initial exam 2/7 patient states they were significantly worse and he felt \"very out of it\". He endorses difficulty with comprehension and hearing, as well. Then, ~0940, patient was found to be lying down on the floor of the bathroom. He states he slowly started lowering himself to his knees, then laid down on his side as he was feeling lightheaded while trying to go to the bathroom. Complete trauma examination completed without any evidence of acute injury.   - Ultimately day 5 Ifosfamide held today @ 0945 due to concern for neurotoxicity and subsequently started with mesna alone (over 18 hours per protocol).   - Improvement/resolution of symptoms above on PM examination 2/8.    # Cancer-related pain  Per patient, pain is much improved from previous and has recently discontinued scheduled celebex.   - Continue PTA topical Bengay PRN at bedtime to left back   - Continue PTA Celebrex 200 mg BID PRN.     # Normocytic anemia  Noted intermittently. Likely related to chemo and underlying disease.   - Transfuse to maintain Hgb >7, will sign blood consent if indicated     CV  # CAD   Followed by Dr. Gigi Vernon at [facility]. He was noted to have coronary calcium on chest CT. CTA 9/29/21 with moderate mid LAD stenosis. Small to moderate caliber ramus branch with severe diffuse disease. Echo completed 9/29/21 with normal EF and no valvular dysfunction. Repeat ECHO on 10/27 with EF 60-65%, early diastolic dysfunction but otherwise no change from previous. Previously has been on rosuvastatin 40 mg daily, however this was recently held due to transaminitis  - Encourage outpatient cardiology follow-up as recommended     # H/o intermittent elevated blood pressures   BPs ranging from normotensive to hypertensive (with SBP 140s-160) during previous admissions. He states that he runs higher at baseline (though this is based on " "clinic assessments as he does not typically monitor his BP at home). Appears recent intermittent clinic BPs sit at 140s-150s/80s-90s when seen in person, otherwise many of the visits are currently virtual. He is not on any antihypertensive medications PTA. Suspect he has undiagnosed essential HTN. Will monitor closely this admission.    - Would recommend outpatient PCP follow-up for further discussion of BP monitoring and management.   - Oral hydralazine available PRN (SBP >170, DBP >100)     GI  # Chemotherapy-induced nausea  Improved C2 with the following regimen, plan to continue this admission. Per patient, nausea typically \"kicks in\" around day 3 or 4.   - Scheduled Zofran Q8H  - PRN compazine and Ativan  - Emend 150 mg IV x1 on D1, repeat dose D4   - Zyprexa 5 mg at bedtime which has been helpful in past      # Transaminitis, resolved  First noted in late Nov: , , TBili WNL on 11/29/21. PTA statin was subsequently held. Per outpatient notes, he did not have any abdominal pain or other symptoms concerning for viral hepatitis. Improved/resolved with holding PTA statin - which has been held on admission. He also has known liver metastases which are likely contributing.   - Okay to continue to hold PTA statin  - Follow LFTs daily     # GERD  - Continue PTA omeprazole  - TUMS and Pepto Bismol available PRN      RENAL/FEN  # Poor PO intake  Receives 2x weekly IVF outpatient due to poor PO intake with chemotherapy cycles. Was notably hypervolemic with last cycle, so will carefully monitor with cycle 4. Cr 0.97 on 1/31 (baseline ~0.8), so will assess kidney function on admission and bolus PRN.   - Follow I/Os, daily weights.  - Trend daily CMP.   - No mIVF at this time; bolus PRN.      # Hypophosphatemia, intermittent  # Hypokalemia, intermittent  Secondary to ifosfamide. Required additional phos and K replacement after recent discharge from C3 doxil/Ifos. Most recent labs on 1/31 notable for phos 2.5, K " 3.5.  - Check Phos, K on admission and assess need for replacement.  - Continue PTA PO Phos and KCl   - Replete additional per standing protocol     ENDO  # Panhypopituitarism  # Macroadenoma of the pituitary gland  Diagnosed in 2010. Found to have a pituitary macroadenoma, 1.9 cm in largest dimension. Underwent hypophysectomy on 8/23/10. Subsequent presumptive pituitary deficiency, on empiric meds for adrenal insufficiency and thyroid. Previously followed by Dr. Bill Mccall; now has established care with Dr. Jamir Grant on 11/11/21.  - Continue PTA Levothyroxine  - Continue PTA Hydrocortisone 15 mg qAM and 10 mg qPM  - Continue endocrinology follow-up as scheduled (next scheduled for 5/16/22)     ENT  # Vocal cord dysfunction  Manifested as hoarseness. Seen by ENT (Dr. Kyree Bearden) on 6/21/21 and noted to have left true vocal fold motion impairment from mediastinal mass. Underwent injection of ProLaryn (filler/bulking agent) on 7/1/21, which reportedly has been minimally beneficial. Seen in follow-up by Dr. Bearden on 9/1/21 and noted to have minimal improvement; he was then referred to [facility] ([facility]).   - No acute inpatient management issues  - Continue outpatient ENT follow-up as previously scheduled (next scheduled for 3/10/22)     # Oral candidiasis  # H/o mucositis  Significant 2/2 Doxil. Noted after C1 chemo, then better following C2 dose-reduction. On admission patient reports thrush again starting a couple days prior to admission. Restarted Nystatin at home on 1/3. Initially denied any mouth sores though endorses poor taste and tongue coating. On admission, has recently started treatment for oral thrush in the past couple days.   - Continue salt/soda swishes  - Continue Nystatin QID with this cycle  - MMW, Doxepin available PRN   - Consider addition of fluconazole in coming days pending worsening/improvement.     DERM  # History of palmar-plantar erythrodysesthesia (Hand-Foot  "Syndrome)  # New itching rash on bilateral hips mostly excoriations at this time, resolved  Due to Doxil. Noted following C2.  - Iced hands and mouth during Doxil infusion, tolerated well   - Will continue this practice with subsequent cycles  - Vaseline on rash on hips     # Rosacea  - Continue PTA metronidazole gel     PSYCH  # History of anxiety/depression  Situational, related to malaise/illness. Worsening per patient as of past few weeks. Discussed with patient on 2/4 surrounding ongoing mood changes with feeling increasingly \"down\" recently. Patient would like to see health psychology (consult placed) and would consider starting a medication. Plan to visit him this PM to discuss starting lexapro 10 mg, however patient was sleeping comfortably, so will defer to tomorrow AM. Add on TSH.  - [facility]; appreciate input      FEN:  - Encouraged PO fluids, bolus PRN  - PRN lyte replacement  - RDAT     Prophy/Misc:  - VTE: ppx Lovenox 40mg daily during admission   - GI/PUD: PTA omeprazole, Tums PRN  - Bowels: Senna and Miralax PRN  - Activity: Consider consulting PT if indicated  - Dry eyes - refresh eye drops     Code: FULL  Disposition: Admitted to Oncology Team 3 service for scheduled chemotherapy 7-day stay. Anticipate discharge ~2/9 pending chemo timing and barring complications.    Follow-up: Follows with Dr. Wolff   - Twice weekly labs/infusion appts are scheduled at [facility] follow-up visit is scheduled on 2/14  - Will request add on Neulasta to infusion added to already-scheduled infusion      Patient seen and discussed with attending physician, Dr. Mratinez.     I spent 40 minutes in the care of this patient today, which included time necessary for review of interval events, obtaining history and physical exam, ordering medications/tests/procedures as medically indicated, review of pertinent medical literature, counseling of the patient, coordination of care, and documentation time. " "Over 50% of time was spent counseling the patient and/or coordinating care.    Lauren Hairston PA-C  Hematology/Oncology  #[id number]    Interval History   Overnight no acute events, although this morning endorses \"spinning\" head, fluctuating mood changes, myoclonic jerks and slowed thought process. All noted to be worsening on 2/6, however on initial exam 2/7 patient states they were significantly worse and he felt \"very out of it\". He endorses difficulty with comprehension and hearing, as well. Then, ~0940, patient was found to be lying down on the floor of the bathroom. He states he slowly started lowering himself to his knees, then laid down on his side as he was feeling lightheaded while trying to go to the bathroom. Complete trauma examination completed without any evidence of acute injury. Discussed discontinuing chemotherapy which patient is in agreement with. Patient situated back in bed and stated he was feeling okay.     On repeat PM exam, patient states he is feeling much better after chemo is done. No nausea or vomiting. Improvement in thinking/processing. All questions answered at this time.     A comprehensive review of symptoms was obtained and negative unless noted above.    Physical Exam   Temp: 98.4  F (36.9  C) Temp src: Temporal BP: (!) 141/88 Pulse: 89   Resp: 18 SpO2: 100 % O2 Device: None (Room air)    Vitals:    02/05/22 0801 02/06/22 1520 02/07/22 0742   Weight: 63.4 kg (139 lb 12.4 oz) 63.5 kg (140 lb 1.6 oz) 63.4 kg (139 lb 12.4 oz)     Vital Signs with Ranges  Temp:  [97.9  F (36.6  C)-98.6  F (37  C)] 98.4  F (36.9  C)  Pulse:  [85-90] 89  Resp:  [18] 18  BP: (118-148)/(75-88) 141/88  SpO2:  [99 %-100 %] 100 %  I/O last 3 completed shifts:  In: 562.8 [IV Piggyback:562.8]  Out: 2525 [Urine:2525]  Constitutional: Pleasant gentleman seen laying in bed. Appears visibly uncomfortable although no acute distress. Appears stated age.  Eyes: Lids and lashes normal, sclera clear, conjunctiva " normal.  ENT: Chronic hemifacial spasm. Normocephalic, without obvious abnormality, atraumatic, oral pharynx with moist mucus membranes. Scattered white spots.   Respiratory: No increased work of breathing, good air exchange, clear to auscultation bilaterally, no crackles or wheezing.  Cardiovascular: Regular rate and rhythm, normal S1 and S2, and no murmur noted.  GI: No masses or scars. +BS. Soft. No tenderness on palpation.  Skin: No bruising or bleeding appreciated on limited exam. Normal skin color, texture and turgor without redness, warmth, or swelling. No jaundice.   Extremities: There is no redness, warmth, or swelling of the joints. Trace - 1+ lower extremity edema. No cyanosis.   Neurologic: Awake, alert, oriented to name, place and time. Occasional myoclonic jerk of upper extremity (R > L). Denies audio/visual hallucinations but endorses frequent, racing thoughts.    Vascular access: Port    Medications     - MEDICATION INSTRUCTIONS -         Chemotherapy Infusing-Continuous Infusion   Does not apply Q8H     [START ON 2/9/2022] dextrose 5% water  10-20 mL Intracatheter Daily at 8 pm     [START ON 2/9/2022] dextrose 5% water  10-20 mL Intracatheter Daily at 8 pm     enoxaparin ANTICOAGULANT  40 mg Subcutaneous Q24H     [START ON 2/9/2022] filgrastim (NEUPOGEN/GRANIX) intravenous  5 mcg/kg (Treatment Plan Recorded) Intravenous Daily at 8 pm     hydrocortisone  10 mg Oral Daily     hydrocortisone  20 mg Oral Daily     ifosfamide (IFEX) infusion  1,350 mg/m2 (Treatment Plan Recorded) Intravenous Q24H     levothyroxine  75 mcg Oral QAM     [START ON 2/8/2022] mesna (MESNEX) infusion  1,350 mg/m2 (Treatment Plan Recorded) Intravenous Once     metroNIDAZOLE   Topical BID     [Held by provider] metroNIDAZOLE   Topical Daily     nystatin  500,000 Units Oral 4x Daily     OLANZapine  5 mg Oral At Bedtime     ondansetron  8 mg Oral Q8H     pantoprazole  40 mg Oral Daily     phosphorus tablet 250 mg  500 mg Oral BID      potassium chloride ER  20 mEq Oral Daily     triamcinolone   Topical BID     Vitamin D3  25 mcg Oral Daily     White Petrolatum   Topical BID       Data   Results for orders placed or performed during the hospital encounter of 02/02/22 (from the past 24 hour(s))   CBC with platelets differential    Narrative    The following orders were created for panel order CBC with platelets differential.  Procedure                               Abnormality         Status                     ---------                               -----------         ------                     CBC with platelets and d...[[id number]]  Abnormal            Final result                 Please view results for these tests on the individual orders.   Comprehensive metabolic panel   Result Value Ref Range    Sodium 142 133 - 144 mmol/L    Potassium 3.2 (L) 3.4 - 5.3 mmol/L    Chloride 112 (H) 94 - 109 mmol/L    Carbon Dioxide (CO2) 21 20 - 32 mmol/L    Anion Gap 9 3 - 14 mmol/L    Urea Nitrogen 18 7 - 30 mg/dL    Creatinine 1.09 0.66 - 1.25 mg/dL    Calcium 9.0 8.5 - 10.1 mg/dL    Glucose 78 70 - 99 mg/dL    Alkaline Phosphatase 156 (H) 40 - 150 U/L    AST 62 (H) 0 - 45 U/L    ALT 50 0 - 70 U/L    Protein Total 6.1 (L) 6.8 - 8.8 g/dL    Albumin 2.6 (L) 3.4 - 5.0 g/dL    Bilirubin Total 0.3 0.2 - 1.3 mg/dL    GFR Estimate 72 >60 mL/min/1.73m2   Magnesium   Result Value Ref Range    Magnesium 1.9 1.8 - 2.6 mg/dL   Phosphorus   Result Value Ref Range    Phosphorus 2.4 (L) 2.5 - 4.5 mg/dL   CBC with platelets and differential   Result Value Ref Range    WBC Count 5.1 4.0 - 11.0 10e3/uL    RBC Count 2.92 (L) 4.40 - 5.90 10e6/uL    Hemoglobin 8.8 (L) 13.3 - 17.7 g/dL    Hematocrit 28.5 (L) 40.0 - 53.0 %    MCV 98 78 - 100 fL    MCH 30.1 26.5 - 33.0 pg    MCHC 30.9 (L) 31.5 - 36.5 g/dL    RDW 17.3 (H) 10.0 - 15.0 %    Platelet Count 232 150 - 450 10e3/uL    % Neutrophils 67 %    % Lymphocytes 19 %    % Monocytes 11 %    % Eosinophils 1 %    % Basophils 1 %     % Immature Granulocytes 1 %    NRBCs per 100 WBC 0 <1 /100    Absolute Neutrophils 3.4 1.6 - 8.3 10e3/uL    Absolute Lymphocytes 1.0 0.8 - 5.3 10e3/uL    Absolute Monocytes 0.6 0.0 - 1.3 10e3/uL    Absolute Eosinophils 0.1 0.0 - 0.7 10e3/uL    Absolute Basophils 0.0 0.0 - 0.2 10e3/uL    Absolute Immature Granulocytes 0.0 <=0.4 10e3/uL    Absolute NRBCs 0.0 10e3/uL

## 2022-02-07 NOTE — PLAN OF CARE
"1900 - 0700  VS stable, afebrile. Pt denied pain/SOB/n/v. CIVI chemo infusing, good blood return through Port, pt c/o slight tremors that he \"always has with chemo\". Pt slept all shift between nursing cares, stated he \"feels the same as yesterday\". No significant events, continue POC.  "

## 2022-02-07 NOTE — PROGRESS NOTES
Nursing Focus: Chemotherapy    D: Positive blood return via Port. Insertion site is clean/dry/intact, dressing intact with no complaints of pain.  Urine output is recorded in intake in Doc Flowsheet.    I: Premedications given per order (see electronic medical administration record). Dose #5 of Ifos/mesna started to infuse over 24 hours. Reviewed pt teaching on chemotherapy side effects.  Pt denies need for further teaching. Chemotherapy double checked per protocol by two chemotherapy competent RN's.   A: Tolerating procedure well. Denies nausea and or pain.   P: Continue to monitor urine output and symptoms of nausea. Screen for symptoms of toxicity.

## 2022-02-07 NOTE — CONSULTS
Health Psychology                                                                                                                          Krystina Montes, Ph.D., L.P [phone]  Elle Massey, Ph.D., L.P. [phone]  Ashtyn Arora, Ph.D. [phone]  Indigo Henry, Ph.D., L.P. [phone]  Roger Ellsworth, Ph.D., A.B.P.P., L.P. [phone]         Ciarra De, Ph.D., L.P. 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    Health Psychology consulted by Lauren Hairston PA-C, Heme/onc, due to history of situational anxiety/depression related to health status and to meet with patient for support.     SUBJECTIVE:  Met with Ifrah and introduced Health Psychology services and discussed nature of referral. Ifrah reported that between the past admission and current admission he has noticed signs of depression such as feeling low, reduced energy, lying in bed/sleeping throughout the day.  He also said that during this time period he was not feeling as well physically either.  He said he is not fretting about anything and felt somewhat surprised by the way he was feeling emotionally prior to admission.  He even questioned if he should come to the hospital in his current state for the planned admission.  He is pleased he did. He said that currently his mood has improved and he is feeling hopeful.  He said the only thing that is cause for concern is his oral health.     Reviewed with Ifrah progression of physical and mental health symptoms.  Provided psychoeducation about connection between  "the mind and the body and how feeling vulnerable in one area of our health can impact how we're feeling in another. Assessed for more clarity in depressive symptoms to ascertain if he was lying in bed because he was feeling poorly or if he was depressed.  He said he remembers being in bed and knowing he had things to do and not wanting to get up.  It is difficult to ease apart mood from cancer-related symptoms.    Provided psychoeducation about how to monitor mood symptoms and how to detect \"red flags\" that may indicate worsening mood.  Ifrah described multiple adaptive coping strategies and pasttimes that he enjoys.  He admitted that the winter days and months was making it harder to engage in some activities and that this likely impacted his mental health.  He is looking forward to better weather and longer days.    Discussed role of outpatient mental health support and how this could be useful for Ifrah for ongoing assessment of mood and support. Record review indicates that a referral was made for psycho-oncology in November 2021, it was closed in January 2022 after patient had reported improvement it seems.  Ifrah expressed interest in having someone to talk to and I agree this would be useful.     PSYCHIATRIC HISTORY:  Remote documentation of depressive episode in November, 2015 based on chart review. In the notes, Ifrah endorsed depressed mood, feeling hopeless at times, anhedonia, low self-esteem. Crisis social work assessment conducted and identified symptoms consistent with unspecified anxiety disorder and unspecified depressive disorder. Symptoms began in context of bulging disc issues requiring oxycodone for pain management. He denied suicidal ideation on assessment.     Ifrah denied history of clinically significant psychiatric symptoms apart from depressive symptoms he was experiencing prior to admission.  Denied history of therapy and medication management for symptoms.     SOCIAL HISTORY:  Ifrah is  and " lives with his wife.  They have five adult children and 19 (or 15?) grandchildren. He previously worked as a  and enjoys spending time golfing and engaging in home improvement/construction projects.     OBJECTIVE:  Met with Ifrah in his hospital room x2.  He was lying on his side in bed covered with a blanket. He reported fair mood today, not feeling too disappointed with stopping treatment a day early, looking forward to going home tomorrow. His affect was flat. Thought processes logical and linear. Insight and judgment good. Denied suicidal ideation.        ASSESSMENT:  Ifrah described symptoms of depression which were higher prior to admission.  It is likely that the symptoms are either directly related to cancer/treatment symptoms or due to changes in functional status due to health issues. He was referred to psycho-oncology in the past and would likely benefit from this currently.  There has also been mention of starting patient on an anti-depressant.  Based on patient report of symptoms, if medically appropriate, this could be a useful intervention for his mood both as a treatment for current symptoms and prevention of future symptoms.  I could also see a case for the patient to delay starting psychotropic medication if he wants to start therapy first.       DIAGNOSIS:  Adjustment disorder with depressed mood    RECOMMENDATIONS:   Please make referral for outpatient mental health follow-up in oncology clinic.  This would be with Dr. Froy Hirsch.    PLAN:  Health Psychology to sign off at this point as Ifrah will be discharging.  Happy to meet with him again during any future admissions.    Ashtyn Arora, PhD,   Clinical Health Psychologist  Phone: [phone]    Time in: 11:45 and 12:20  Time out: 11:40 and 12:55

## 2022-02-07 NOTE — PLAN OF CARE
C4 D6 Doxil/Ifos. Ifos/Mesna had to be stopped at 0945 due to neurotoxicity (fall and increase in bilateral hand jerks). Mesna started as soon as it was available from pharmacy. Ifrah did not receive any injuries from the fall. This afternoon Ifrah stated that he feels much better after the chemotherapy was stopped. Bed alarm on for pt safety. Falls wrist band in place. Pt has been calling appropriately when needing to get out of bed. Up with standby assist. No po intake except for clears r/t nausea and generally just not feeling well. Voiding spontaneously without difficulty, has not been saving urine to be measured. BM today. Currently receiving Mesna over 16 hours. Will most likely discharge to home tomorrow.

## 2022-02-08 VITALS
TEMPERATURE: 98.8 F | WEIGHT: 138.9 LBS | BODY MASS INDEX: 21.8 KG/M2 | DIASTOLIC BLOOD PRESSURE: 72 MMHG | SYSTOLIC BLOOD PRESSURE: 130 MMHG | OXYGEN SATURATION: 99 % | HEART RATE: 90 BPM | RESPIRATION RATE: 16 BRPM | HEIGHT: 67 IN

## 2022-02-08 LAB
ALBUMIN SERPL-MCNC: 2.6 G/DL (ref 3.4–5)
ALP SERPL-CCNC: 147 U/L (ref 40–150)
ALT SERPL W P-5'-P-CCNC: 57 U/L (ref 0–70)
ANION GAP SERPL CALCULATED.3IONS-SCNC: 8 MMOL/L (ref 3–14)
AST SERPL W P-5'-P-CCNC: 65 U/L (ref 0–45)
BASOPHILS # BLD MANUAL: 0 10E3/UL (ref 0–0.2)
BASOPHILS NFR BLD MANUAL: 1 %
BILIRUB SERPL-MCNC: 0.3 MG/DL (ref 0.2–1.3)
BUN SERPL-MCNC: 19 MG/DL (ref 7–30)
CALCIUM SERPL-MCNC: 9.1 MG/DL (ref 8.5–10.1)
CHLORIDE BLD-SCNC: 114 MMOL/L (ref 94–109)
CO2 SERPL-SCNC: 20 MMOL/L (ref 20–32)
CREAT SERPL-MCNC: 1.13 MG/DL (ref 0.66–1.25)
EOSINOPHIL # BLD MANUAL: 0 10E3/UL (ref 0–0.7)
EOSINOPHIL NFR BLD MANUAL: 1 %
ERYTHROCYTE [DISTWIDTH] IN BLOOD BY AUTOMATED COUNT: 16.7 % (ref 10–15)
GFR SERPL CREATININE-BSD FRML MDRD: 69 ML/MIN/1.73M2
GLUCOSE BLD-MCNC: 69 MG/DL (ref 70–99)
HCT VFR BLD AUTO: 27.9 % (ref 40–53)
HGB BLD-MCNC: 8.7 G/DL (ref 13.3–17.7)
LYMPHOCYTES # BLD MANUAL: 0.7 10E3/UL (ref 0.8–5.3)
LYMPHOCYTES NFR BLD MANUAL: 15 %
MAGNESIUM SERPL-MCNC: 1.9 MG/DL (ref 1.8–2.6)
MCH RBC QN AUTO: 30.2 PG (ref 26.5–33)
MCHC RBC AUTO-ENTMCNC: 31.2 G/DL (ref 31.5–36.5)
MCV RBC AUTO: 97 FL (ref 78–100)
MONOCYTES # BLD MANUAL: 0.1 10E3/UL (ref 0–1.3)
MONOCYTES NFR BLD MANUAL: 3 %
MYELOCYTES # BLD MANUAL: 0 10E3/UL
MYELOCYTES NFR BLD MANUAL: 1 %
NEUTROPHILS # BLD MANUAL: 3.6 10E3/UL (ref 1.6–8.3)
NEUTROPHILS NFR BLD MANUAL: 79 %
PHOSPHATE SERPL-MCNC: 2.8 MG/DL (ref 2.5–4.5)
PLAT MORPH BLD: ABNORMAL
PLATELET # BLD AUTO: 209 10E3/UL (ref 150–450)
POTASSIUM BLD-SCNC: 3.2 MMOL/L (ref 3.4–5.3)
POTASSIUM BLD-SCNC: 3.7 MMOL/L (ref 3.4–5.3)
PROT SERPL-MCNC: 6.1 G/DL (ref 6.8–8.8)
RBC # BLD AUTO: 2.88 10E6/UL (ref 4.4–5.9)
RBC MORPH BLD: ABNORMAL
SODIUM SERPL-SCNC: 142 MMOL/L (ref 133–144)
WBC # BLD AUTO: 4.6 10E3/UL (ref 4–11)

## 2022-02-08 PROCEDURE — 250N000013 HC RX MED GY IP 250 OP 250 PS 637: Performed by: PHYSICIAN ASSISTANT

## 2022-02-08 PROCEDURE — 99239 HOSP IP/OBS DSCHRG MGMT >30: CPT | Mod: FS | Performed by: STUDENT IN AN ORGANIZED HEALTH CARE EDUCATION/TRAINING PROGRAM

## 2022-02-08 PROCEDURE — 250N000011 HC RX IP 250 OP 636: Performed by: PHYSICIAN ASSISTANT

## 2022-02-08 PROCEDURE — 84132 ASSAY OF SERUM POTASSIUM: CPT | Performed by: STUDENT IN AN ORGANIZED HEALTH CARE EDUCATION/TRAINING PROGRAM

## 2022-02-08 PROCEDURE — 85027 COMPLETE CBC AUTOMATED: CPT | Performed by: PHYSICIAN ASSISTANT

## 2022-02-08 PROCEDURE — 250N000011 HC RX IP 250 OP 636: Performed by: STUDENT IN AN ORGANIZED HEALTH CARE EDUCATION/TRAINING PROGRAM

## 2022-02-08 PROCEDURE — 84100 ASSAY OF PHOSPHORUS: CPT | Performed by: PHYSICIAN ASSISTANT

## 2022-02-08 PROCEDURE — 258N000003 HC RX IP 258 OP 636: Performed by: PHYSICIAN ASSISTANT

## 2022-02-08 PROCEDURE — 250N000011 HC RX IP 250 OP 636: Performed by: PEDIATRICS

## 2022-02-08 PROCEDURE — 36591 DRAW BLOOD OFF VENOUS DEVICE: CPT | Performed by: PHYSICIAN ASSISTANT

## 2022-02-08 PROCEDURE — 80053 COMPREHEN METABOLIC PANEL: CPT | Performed by: PHYSICIAN ASSISTANT

## 2022-02-08 PROCEDURE — 36591 DRAW BLOOD OFF VENOUS DEVICE: CPT | Performed by: STUDENT IN AN ORGANIZED HEALTH CARE EDUCATION/TRAINING PROGRAM

## 2022-02-08 PROCEDURE — 83735 ASSAY OF MAGNESIUM: CPT | Performed by: PHYSICIAN ASSISTANT

## 2022-02-08 RX ORDER — POTASSIUM CHLORIDE 29.8 MG/ML
20 INJECTION INTRAVENOUS ONCE
Status: COMPLETED | OUTPATIENT
Start: 2022-02-08 | End: 2022-02-08

## 2022-02-08 RX ORDER — HEPARIN SODIUM,PORCINE 10 UNIT/ML
3-6 VIAL (ML) INTRAVENOUS
Status: DISCONTINUED | OUTPATIENT
Start: 2022-02-08 | End: 2022-02-08 | Stop reason: HOSPADM

## 2022-02-08 RX ORDER — HEPARIN SODIUM,PORCINE 10 UNIT/ML
3-6 VIAL (ML) INTRAVENOUS EVERY 24 HOURS
Status: DISCONTINUED | OUTPATIENT
Start: 2022-02-08 | End: 2022-02-08 | Stop reason: HOSPADM

## 2022-02-08 RX ORDER — FLUCONAZOLE 200 MG/1
200 TABLET ORAL DAILY
Qty: 7 TABLET | Refills: 0 | Status: SHIPPED | OUTPATIENT
Start: 2022-02-08 | End: 2022-02-14

## 2022-02-08 RX ORDER — CELECOXIB 200 MG/1
200 CAPSULE ORAL 2 TIMES DAILY PRN
Qty: 60 CAPSULE | Refills: 3
Start: 2022-02-08 | End: 2022-02-14

## 2022-02-08 RX ORDER — ESCITALOPRAM OXALATE 10 MG/1
10 TABLET ORAL DAILY
Qty: 30 TABLET | Refills: 1 | Status: SHIPPED | OUTPATIENT
Start: 2022-02-08 | End: 2022-04-06

## 2022-02-08 RX ORDER — HEPARIN SODIUM (PORCINE) LOCK FLUSH IV SOLN 100 UNIT/ML 100 UNIT/ML
5 SOLUTION INTRAVENOUS
Status: DISCONTINUED | OUTPATIENT
Start: 2022-02-08 | End: 2022-02-08 | Stop reason: HOSPADM

## 2022-02-08 RX ADMIN — LEVOTHYROXINE SODIUM 75 MCG: 0.05 TABLET ORAL at 06:31

## 2022-02-08 RX ADMIN — Medication 5 ML: at 06:22

## 2022-02-08 RX ADMIN — Medication 5 ML: at 12:19

## 2022-02-08 RX ADMIN — PANTOPRAZOLE SODIUM 40 MG: 40 TABLET, DELAYED RELEASE ORAL at 08:08

## 2022-02-08 RX ADMIN — NYSTATIN 500000 UNITS: 100000 SUSPENSION ORAL at 08:08

## 2022-02-08 RX ADMIN — DIBASIC SODIUM PHOSPHATE, MONOBASIC POTASSIUM PHOSPHATE AND MONOBASIC SODIUM PHOSPHATE 500 MG: 852; 155; 130 TABLET ORAL at 08:07

## 2022-02-08 RX ADMIN — ONDANSETRON HYDROCHLORIDE 8 MG: 8 TABLET, FILM COATED ORAL at 08:29

## 2022-02-08 RX ADMIN — LORAZEPAM 0.5 MG: 0.5 TABLET ORAL at 06:42

## 2022-02-08 RX ADMIN — POTASSIUM CHLORIDE 20 MEQ: 29.8 INJECTION, SOLUTION INTRAVENOUS at 08:06

## 2022-02-08 RX ADMIN — METRONIDAZOLE: 7.5 GEL TOPICAL at 08:06

## 2022-02-08 RX ADMIN — SODIUM CHLORIDE 500 ML: 9 INJECTION, SOLUTION INTRAVENOUS at 08:06

## 2022-02-08 RX ADMIN — POTASSIUM CHLORIDE 20 MEQ: 750 TABLET, EXTENDED RELEASE ORAL at 08:08

## 2022-02-08 RX ADMIN — TRIAMCINOLONE ACETONIDE: 1 CREAM TOPICAL at 08:06

## 2022-02-08 RX ADMIN — Medication 25 MCG: at 08:08

## 2022-02-08 RX ADMIN — ONDANSETRON HYDROCHLORIDE 8 MG: 8 TABLET, FILM COATED ORAL at 11:43

## 2022-02-08 RX ADMIN — WHITE PETROLATUM: 1 OINTMENT TOPICAL at 08:24

## 2022-02-08 RX ADMIN — HYDROCORTISONE 20 MG: 10 TABLET ORAL at 08:07

## 2022-02-08 ASSESSMENT — ACTIVITIES OF DAILY LIVING (ADL)
ADLS_ACUITY_SCORE: 7

## 2022-02-08 ASSESSMENT — MIFFLIN-ST. JEOR: SCORE: 1333.68

## 2022-02-08 NOTE — PLAN OF CARE
"[id number]  VSS. Alert and oriented. Pt states his visual changes from chemotherapy are improving. Stated that \"instead of seeing things every few seconds, I only have issues every few minutes.\" Mesna infusion complete. No acute events, continue with POC.  "

## 2022-02-08 NOTE — PLAN OF CARE
[id number]: Intermittently hypertensive, not met parameters to notify. C/o of mild headache, tylenol given x1. Overall, pt is tired and states that he is not feeling well. Alert and oriented x4, speech is intact. Denies numbness and tingling. C/o of nausea, scheduled PO Zofran and PRN PO compazine given. LBM 2/7/22. Pt using call light appropriately, SBA. Possibly discharging tomorrow.

## 2022-02-08 NOTE — DISCHARGE SUMMARY
Attestation signed by Donnie Martinez MD at 2/8/2022  3:36 PM   Physician Attestation   I, Donnie Martinez MD, personally saw and evaluated Ifrah Huitron as part of a shared visit.  I have reviewed and discussed with the advanced practice provider their discharge plan.     My key history or physical exam findings from the day of discharge:   Due to neurologic side-effects had to stop ifosfomide on day 6. Did not get last dose of ifosfomide. Since stopping the chemo yesterday, he has noticed significant improvement. No more hallucinations, or myoclonic jerks. Nausea is better controlled today.      Key management decisions made by me: Had significant neurotoxicity from ifosfamide (myoclonic jerks, hallucinations and fall). May need to adjust dose for next cycle, will see Dr. Wolff soon. Also had mild HUSSEIN, has follow up labs arranged, will need to monitor. Encourgaed PO intake.     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 Patient unfortunately had neurotoxicity with this cycle of ifosfamide (exhibited by fall, slowed thought process, fluctuating mood changes and myoclonic jerks) as well as severe nausea/vomiting, worsening from previous cycles. These symptoms all significantly improved after  discontinuation of the chemotherapy ~0945 on 2/7/22 and he subsequently completed 18 hour mesna washout. On day of discharge, patient is hemodynamically stable, non-toxic appearing and looking forward to going home. Received additional 500 ml bolus of IVF due to mildly increased kidney function today (Cr 1.13) although suspect this is due to poor PO intake in the setting of severe n/v with chemotherapy. He has appropriate follow up arranged as below, including lab/IVF in two days. Patient states understanding of discharge precautions and questions answered at bedside.     Recommendations for Outpatient:  - Message sent to Thuan Hirsch for ongoing psychologic care - patient seen inpatient with desire to continue following. Started on lexapro 10 mg daily per discussion with patient; please continue to monitor for side effects/need for dose adjustment.  - Message sent to Dr. oWlff/Maynor CHOW on day of discharge to update on early discontinuation of ifosfamide (36 hours early) due to neurotoxicity. Per notes, plan for repeat imaging after cycle 4 which was requested as well.   - Continue to monitor for improvement with thrush (started on fluconazole day of discharge - continuing PTA nystatin).  - Continue to monitor kidney function - mildly increased kidney function today (Cr 1.13) although suspect this is due to poor PO intake in the setting of severe n/v with chemotherapy. He has appropriate follow up arranged as below, including lab/IVF in two days.    Follow-up:  - 2/10 - Labs/Neulasta/IV fluids  - 2/14 - MARLEEN visit/SLP visit/labs/possible IV fluids  - 2/17, 2/21, 2/24, 2/28, 3/3, 3/7 - labs/possible IV fluids  - 3/9 - palliative care follow up  - 5/16 - endocrine follow up  - Requested: repeat scans/follow up with Dr. Wolff in ~4 weeks, health psych follow up.    New/changed medications:   - start fluconazole 200 mg daily x 7 days for worsening thrush  - start lexapro 10 mg daily   - continue PTA anti-emetics   -  continue PTA celebrex; currently taking PRN due to improvement in pain  - continue PTA lyte replacements - phos 2 tabs BID, potassium 20 mEq daily.     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 Wolff. Patient presented to his PCP in 03/2021 with chest/left shoulder and back pain that led to imaging which revealed multiple lung mets, included a left pleural mass. There were difficulties in getting diagnostic biopsy; eventually, biopsy of the mediastinal mass (5/20/21) revealed a poorly characterized malignant spindle cell neoplasm with high PD-L1 expression (90%), Ki-67 70%. 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 Insufficient material to send [facility]. Baseline imaging (6/21/21) revealed progression of lung mets and left-sided subdiaphragmatic mass, stable liver lesions. He was seen by ENT for hoarseness, which was attributed to left true vocal fold motion impairment from mediastinal mass. Given high PD-L1 expression, he was started on Keytruda (C1=6/21/21). Interval imaging (CT 8/2/21) revealed positive response to treatment. He received 6 cycles total, most recently C6=10/4/21. Unfortunately, restaging imaging (10/18/21) revealed interval increase in size of the LUQ/splenic mass; however, the mediastinal and left pleural mass were stable to slightly decreased in size. He met with Dr. Wolff, who recommended a change in therapy to Doxil/ifosfamide. He was admitted on 10/26/21 to receive Cycle #1 of Doxil/ifos which he tolerated reasonably well with no neurotoxicities, but did have moderate chemotherapy-induced nausea. After discharge he had significant mucositis, poor PO intake, nausea, and lyte derangements. Given adverse effects, Cycle 2 was delayed by 1 week and Doxil was dose reduced to 70 mg, or 40 mg/m2 (previously given 80 mg, or 45mg/m2), and ifosfamide was reduced by 10%  "(1500mg/m2 ? 1350mg/m2).   - Tolerated Cycle 2 Doxil/Ifos (D1=12/1/21) better, so plan to proceed with same dosing for cycle 3.  - Cycle 3 tolerated well with same dose, plan to proceed with same dose.    - Port placed outpatient on 1/28/22; will access on admission.   - Outpatient team plans to restage with repeat imaging after Cycle 4.                                           Treatment Plan: Doxil + ifosfamide (C4D1=2/2/22).               Doxil 70mg IV once - D1                Ifosfamide/Mesna 1350 mg/m2 (2510 mg) CIVI - D1-6                             - Completed 5.5 bags of ifosfamide prior to being held for neurotoxicity as below               Mesna 1350 mg/m2 (2510 mg) IV over 18 hrs - starting D7                Pre-meds: Emend 150 mg once Day 1 and Day 4; Zofran 8 mg Q8H                Neulasta injection - D8 - will request @ [facility] on 2/10     # Toxic encephalopathy due to neurotoxicity, secondary to ifosfamide   # Fall  Mild symptoms noted throughout previous admissions, however this admission notably worsened with symptoms including severe nausea/vomiting, \"spinning\" head, fluctuating mood changes, myoclonic jerks and slowed thought process. All noted to be worsening on 2/6, however on initial exam 2/7 patient states they were significantly worse and he felt \"very out of it\". He endorses difficulty with comprehension and hearing, as well. Then, ~0940, patient was found to be lying down on the floor of the bathroom. He states he slowly started lowering himself to his knees, then laid down on his side as he was feeling lightheaded while trying to go to the bathroom. Complete trauma examination completed without any evidence of acute injury.   - Ultimately day 5 Ifosfamide held today @ 0945 due to concern for neurotoxicity and subsequently started with mesna alone (over 18 hours per protocol).   - Improvement/resolution of symptoms above on PM examination 2/8.     # Cancer-related pain  Per patient, pain is " "much improved from previous and has recently discontinued scheduled celebex.   - Continue PTA topical Bengay PRN at bedtime to left back   - Continue PTA Celebrex 200 mg BID PRN.     # Normocytic anemia  Noted intermittently. Likely related to chemo and underlying disease.   - Transfuse to maintain Hgb >7, will sign blood consent if indicated     CV  # CAD   Followed by Dr. Gigi Vernon at [facility]. He was noted to have coronary calcium on chest CT. CTA 9/29/21 with moderate mid LAD stenosis. Small to moderate caliber ramus branch with severe diffuse disease. Echo completed 9/29/21 with normal EF and no valvular dysfunction. Repeat ECHO on 10/27 with EF 60-65%, early diastolic dysfunction but otherwise no change from previous. Previously has been on rosuvastatin 40 mg daily, however this was recently held due to transaminitis  - Encourage outpatient cardiology follow-up as recommended     # H/o intermittent elevated blood pressures   BPs ranging from normotensive to hypertensive (with SBP 140s-160) during previous admissions. He states that he runs higher at baseline (though this is based on clinic assessments as he does not typically monitor his BP at home). Appears recent intermittent clinic BPs sit at 140s-150s/80s-90s when seen in person, otherwise many of the visits are currently virtual. He is not on any antihypertensive medications PTA. Suspect he has undiagnosed essential HTN. Will monitor closely this admission.    - Would recommend outpatient PCP follow-up for further discussion of BP monitoring and management.   - Oral hydralazine available PRN (SBP >170, DBP >100)     GI  # Chemotherapy-induced nausea  Improved C2 with the following regimen, plan to continue this admission. Per patient, nausea typically \"kicks in\" around day 3 or 4.   - Scheduled Zofran Q8H  - PRN compazine and Ativan  - Emend 150 mg IV x1 on D1, repeat dose D4   - Zyprexa 5 mg at bedtime which has been helpful in " past      # Transaminitis, resolved  First noted in late Nov: , , TBili WNL on 11/29/21. PTA statin was subsequently held. Per outpatient notes, he did not have any abdominal pain or other symptoms concerning for viral hepatitis. Improved/resolved with holding PTA statin - which has been held on admission. He also has known liver metastases which are likely contributing.   - Okay to continue to hold PTA statin  - Follow LFTs daily     # GERD  - Continue PTA omeprazole  - TUMS and Pepto Bismol available PRN      RENAL/FEN  # Poor PO intake  # Moderate malnutrition  Receives 2x weekly IVF outpatient due to poor PO intake with chemotherapy cycles. Was notably hypervolemic with last cycle, so will carefully monitor with cycle 4. Cr 0.97 on 1/31 (baseline ~0.8), so will assess kidney function on admission and bolus PRN.    - Follow I/Os, daily weights.  - Trend daily CMP.   - No mIVF at this time; bolus PRN  - Appreciate RD input. Education provided on high calorie foods.  - Offered supplement but patient declined. Will continue to monitor.     # Hypophosphatemia, intermittent  # Hypokalemia, intermittent  Secondary to ifosfamide. Required additional phos and K replacement after recent discharge from C3 doxil/Ifos. Most recent labs on 1/31 notable for phos 2.5, K 3.5.  - Check Phos, K on admission and assess need for replacement.  - Continue PTA PO Phos and KCl   - Replete additional per standing protocol     ENDO  # Panhypopituitarism  # Macroadenoma of the pituitary gland  Diagnosed in 2010. Found to have a pituitary macroadenoma, 1.9 cm in largest dimension. Underwent hypophysectomy on 8/23/10. Subsequent presumptive pituitary deficiency, on empiric meds for adrenal insufficiency and thyroid. Previously followed by Dr. Bill Mccall; now has established care with Dr. Jamir Grant on 11/11/21.  - Continue PTA Levothyroxine  - Continue PTA Hydrocortisone 15 mg qAM and 10 mg qPM  - Continue  "endocrinology follow-up as scheduled (next scheduled for 5/16/22)     ENT  # Vocal cord dysfunction  Manifested as hoarseness. Seen by ENT (Dr. Kyree Bearden) on 6/21/21 and noted to have left true vocal fold motion impairment from mediastinal mass. Underwent injection of ProLaryn (filler/bulking agent) on 7/1/21, which reportedly has been minimally beneficial. Seen in follow-up by Dr. Bearden on 9/1/21 and noted to have minimal improvement; he was then referred to the [facility] ([facility]).   - No acute inpatient management issues  - Continue outpatient ENT follow-up as previously scheduled (next scheduled for 3/10/22)     # Oral candidiasis  # H/o mucositis  Significant 2/2 Doxil. Noted after C1 chemo, then better following C2 dose-reduction. On admission patient reports thrush again starting a couple days prior to admission. Restarted Nystatin at home on 1/3. Initially denied any mouth sores though endorses poor taste and tongue coating. On admission, has recently started treatment for oral thrush in the past couple days.   - Continue salt/soda swishes  - Continue Nystatin QID with this cycle  - MMW, Doxepin available PRN   - Consider addition of fluconazole in coming days pending worsening/improvement.     DERM  # History of palmar-plantar erythrodysesthesia (Hand-Foot Syndrome)  # New itching rash on bilateral hips mostly excoriations at this time, resolved  Due to Doxil. Noted following C2.  - Iced hands and mouth during Doxil infusion, tolerated well   - Will continue this practice with subsequent cycles  - Vaseline on rash on hips     # Rosacea  - Continue PTA metronidazole gel     PSYCH  # History of anxiety/depression  Situational, related to malaise/illness. Worsening per patient as of past few weeks. Discussed with patient on 2/4 surrounding ongoing mood changes with feeling increasingly \"down\" recently. Patient would like to see health psychology (consult placed) and would consider starting a " medication. Plan to visit him this PM to discuss starting lexapro 10 mg, however patient was sleeping comfortably, so will defer to tomorrow AM. Add on TSH.  - [facility] consulted; appreciate input     Patient seen in collaboration with attending physician, Dr. Martinez.    Lauren Hairston PA-C   Hematology/Oncology  Pager: [phone]    Significant Results and Procedures   See below    Pending Results   These results will be followed up by NA  Unresulted Labs Ordered in the Past 30 Days of this Admission     No orders found from 1/3/2022 to 2/3/2022.          Code Status   Full Code    Primary Care Physician   Sukhdev Kohler  Constitutional: Pleasant gentleman seen laying in bed. No apparent distress, and appears stated age.  Eyes: Lids and lashes normal, sclera clear, conjunctiva normal.  ENT: Chronic hemifacial spasm. Normocephalic, without obvious abnormality, atraumatic, oral pharynx with moist mucus membranes. Worsening white coating on entire tongue.    Respiratory: No increased work of breathing, good air exchange, clear to auscultation bilaterally, no crackles or wheezing.  Cardiovascular: Regular rate and rhythm, normal S1 and S2, and no murmur noted.  GI: No masses or scars. +BS. Soft. No tenderness on palpation.  Skin: No bruising or bleeding appreciated on limited exam. Normal skin color, texture and turgor without redness, warmth, or swelling. No jaundice.   Extremities: There is no redness, warmth, or swelling of the joints. Trace lower extremity edema. No cyanosis.   Neurologic: Awake, alert, oriented to name, place and time.    Vascular access: Port       Time Spent on this Encounter   Lauren DEE PA-C, personally saw the patient today and spent greater than 30 minutes discharging this patient.    Discharge Disposition   Discharged to home  Condition at discharge: Stable    Consultations This Hospital Stay   DERMATOLOGY IP CONSULT  PSYCHOLOGY ADULT IP CONSULT    Discharge Orders       Care Coordination Referral      Reason for your hospital stay    You were hospitalized for doxil/ifosfamide chemotherapy. This was ultimately stopped 1 day early due to concerns about neuro-toxicity, or the effects on the brain. These symptoms have all gotten better since you have been off of the chemo and you completed the mesna (bladder protection) therapy as well.     Activity    Your activity upon discharge: activity as tolerated     When to contact your care team    [facility] [place]age [place] at [phone] for temp > or = 100.4, uncontrolled nausea/vomiting/diarrhea/constipation, unrelieved pain, bleeding not relieved with pressure, dizziness, chest pain, shortness of breath, loss of consciousness, and any new or concerning symptoms.     Follow Up and recommended labs and tests    - 2/10 - Labs/Neulasta/IV fluids  - 2/14 - MARLEEN visit/SLP visit/labs/possible IV fluids  - 2/17, 2/21, 2/24, 2/28, 3/3, 3/7 - labs/possible IV fluids  - 3/9 - Follow up with palliative care  - Message sent to Dr. Wolff/Maynor to confirm timing for repeat scans/chemotherapy plan     Diet    Follow this diet upon discharge: Regular Diet Adult     Discharge Medications   Current Discharge Medication List      START taking these medications    Details   escitalopram (LEXAPRO) 10 MG tablet Take 1 tablet (10 mg) by mouth daily  Qty: 30 tablet, Refills: 1    Associated Diagnoses: Chemotherapy-induced neutropenia (H); Spindle cell sarcoma (H)         CONTINUE these medications which have CHANGED    Details   celecoxib (CELEBREX) 200 MG capsule Take 1 capsule (200 mg) by mouth 2 times daily as needed for moderate pain . Note this is a new a strength! Only one capsule at a time!  Qty: 60 capsule, Refills: 3    Associated Diagnoses: Cancer associated pain         CONTINUE these medications which have NOT CHANGED    Details   acetaminophen (TYLENOL) 500 MG tablet Take 500-1,000 mg by mouth every 6 hours as needed for mild pain       calcium carbonate (TUMS) 500 MG chewable tablet Take 1 tablet (500 mg) by mouth 3 times daily as needed for heartburn      cholecalciferol (VITAMIN D-1000 MAX ST) 1000 units TABS Take 1,000 Units by mouth daily       doxepin (SINEQUAN) 10 MG/ML (HIGH CONC) solution Take 2 mLs (20 mg) by mouth every 4 hours as needed for other (mouth pain) . Mix with equal parts tap water. Swish and hold in mouth ~60 seconds then spit out.  Qty: 118 mL, Refills: 3    Associated Diagnoses: Stomatitis and mucositis; Spindle cell sarcoma (H)      guaiFENesin-codeine (GUAIFENESIN AC) 100-10 MG/5ML syrup Take 10 mLs by mouth every 4 hours as needed for cough  Qty: 118 mL, Refills: 0    Associated Diagnoses: Vocal fold paralysis, left; Dysphonia; Muscle tension dysphonia; Mesothelioma, malignant (H); Cough      hydrocortisone (CORTEF) 10 MG tablet Take 20 mg in the morning and 10 mg in the afternoon  Qty: 270 tablet, Refills: 3    Associated Diagnoses: Hypopituitarism (H)      levothyroxine (SYNTHROID/LEVOTHROID) 75 MCG tablet Take 1 tablet (75 mcg) by mouth every morning  Qty: 90 tablet, Refills: 3    Associated Diagnoses: Hypopituitarism (H)      LORazepam (ATIVAN) 0.5 MG tablet Take 1 tablet (0.5 mg) by mouth every 4 hours as needed for nausea or vomiting . Caution: causes sedation.  Qty: 30 tablet, Refills: 0    Associated Diagnoses: Chemotherapy-induced nausea      Menthol-Methyl Salicylate (DALIA MALIK GREASELESS) cream Apply topically every 6 hours as needed      metroNIDAZOLE (METROGEL) 1 % external gel Apply topically daily .Try stronger dose due to increased symptoms after chemo.  Qty: 30 g, Refills: 0    Associated Diagnoses: Rosacea      nystatin (MYCOSTATIN) 771972 UNIT/ML suspension Take 5 mLs (500,000 Units) by mouth 4 times daily Hold while taking Fluconazole, could resume after Fluconazole if you have ongoing coating on tongue.  Qty: 473 mL, Refills: 0    Associated Diagnoses: Thrush      OLANZapine (ZYPREXA) 5 MG tablet  "Take 1 tablet (5 mg) by mouth At Bedtime Continue until your nausea resolves  Qty: 30 tablet, Refills: 1    Associated Diagnoses: Chemotherapy-induced nausea      omeprazole (PRILOSEC) 20 MG DR capsule Take 2 capsules (40 mg) by mouth daily  Qty: 60 capsule, Refills: 1      ondansetron (ZOFRAN) 4 MG tablet Take 1-2 tablets (4-8 mg) by mouth every 8 hours as needed for nausea  Qty: 60 tablet, Refills: 3    Associated Diagnoses: Chemotherapy-induced nausea      phosphorus tablet 250 mg (PHOSPHA 250 NEUTRAL) 250 MG per tablet Take 2 tablets (500 mg) by mouth daily  Qty: 60 tablet, Refills: 1    Associated Diagnoses: Hypophosphatemia      potassium chloride ER (K-TAB) 20 MEQ CR tablet Take 1 tablet (20 mEq) by mouth daily  Qty: 60 tablet, Refills: 1    Associated Diagnoses: Hypokalemia      prochlorperazine (COMPAZINE) 5 MG tablet Take 1 tablet (5 mg) by mouth every 6 hours as needed for nausea or vomiting . Caution: causes sedation.  Qty: 30 tablet, Refills: 1    Associated Diagnoses: Chemotherapy-induced nausea      SUMAtriptan (IMITREX) 50 MG tablet Take 1 tablet (50 mg) by mouth at onset of headache for migraine May repeat in 2 hours. Max 4 tablets/24 hours.  Qty: 10 tablet, Refills: 3    Associated Diagnoses: Migraine with aura and without status migrainosus, not intractable      triamcinolone (KENALOG) 0.1 % external cream Apply topically 2 times daily       Insulin Syringe-Needle U-100 27.5G X 5/8\" 2 ML MISC 1 each daily as needed (with solucortef for adrenal crisis)  Qty: 10 each, Refills: 1    Associated Diagnoses: Adrenal insufficiency (H)         STOP taking these medications       metroNIDAZOLE (METROGEL) 0.75 % external gel Comments:   Reason for Stopping:             Allergies   Allergies   Allergen Reactions     Penicillins Hives and Swelling            Data   Most Recent 3 CBC's:Recent Labs   Lab Test 02/08/22  0627 02/07/22  0537 02/06/22  0548   WBC 4.6 5.1 6.5   HGB 8.7* 8.8* 8.1*   MCV 97 98 97   PLT " 209 232 215      Most Recent 3 BMP's:  Recent Labs   Lab Test 02/08/22  0627 02/07/22  1607 02/07/22  0537 02/06/22  0548     --  142 141   POTASSIUM 3.2* 3.4 3.2* 3.5   CHLORIDE 114*  --  112* 112*   CO2 20  --  21 24   BUN 19  --  18 15   CR 1.13  --  1.09 0.95   ANIONGAP 8  --  9 5   COTY 9.1  --  9.0 8.7   GLC 69*  --  78 84     Most Recent 2 LFT's:  Recent Labs   Lab Test 02/08/22  0627 02/07/22  0537   AST 65* 62*   ALT 57 50   ALKPHOS 147 156*   BILITOTAL 0.3 0.3     Most Recent INR's and Anticoagulation Dosing History:  Anticoagulation Dose History     Recent Dosing and Labs Latest Ref Rng & Units 4/29/2021 12/1/2021 1/4/2022    INR 0.85 - 1.15 1.13 1.14 1.12        Most Recent 3 Troponin's:  Recent Labs   Lab Test 08/02/21  1458 05/23/19  0909   TROPI  --  <0.015   TROPONIN <0.015  --      Most Recent Cholesterol Panel:No lab results found.  Most Recent 6 Bacteria Isolates From Any Culture (See EPIC Reports for Culture Details):  Recent Labs   Lab Test 05/20/21  1254 05/23/19  0943 05/23/19  0911   CULT Culture negative after 4 weeks  No anaerobes isolated  Since this specimen was not transported in the proper anaerobic transport media, the   absence of anaerobes in this culture does not rule out the presence of anaerobes in this   specimen.    No growth No growth No growth     Most Recent TSH, T4 and A1c Labs:  Recent Labs   Lab Test 02/04/22  1531   TSH 0.21*   T4 1.45

## 2022-02-08 NOTE — PROGRESS NOTES
Ifrah is a 71 year old who is being evaluated via a billable video visit.      How would you like to obtain your AVS? MyChart  If the video visit is dropped, the invitation should be resent by: Text to cell phone: [phone]  Will anyone else be joining your video visit? No Wife is with him.    Amanda Cathy VF        Video Start Time: 1:30pm    Video-Visit Details    Type of service:  Video Visit    Video End Time: 2:00pm    Originating Location (pt. Location): Home    Distant Location (provider location):  [facility]     Platform used for Video Visit: [facility]    Oncology/Hematology Visit Note  Feb 14, 2022    Reason for Visit: Follow up of spindle cell sarcoma     History of Present Illness: Ifrah Huitron is a 71 year old male with PMH rosacea, pituitary macroadenoma s/p resection, GERD, kidney stones, DJD with metastatic spindle cell sarcoma. He presented to his PCP March 2021 with chest pain/left shoulder and back pain that led to imaging which revealed multiple lung mets. There were difficulties in getting diagnostic biopsy, eventually biopsy of mediastinal mass with spindle cell sarcoma. There was high PD-L1 expression (90%). Baseline imaging 6/21/21 with progression of lung mets and left-sided subdiaphragmatic mass, stable liver lesions. 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      He started Keytruda 6/21/21. CT 8/2/21 with positive response to treatment. CT 10/18/21 with mixed response- LUQ mass larger, anterior mediastinal and left anterior pleural mass smaller. Keytruda stopped.     Started on Doxil + Ifosfamide 10/26/21. Poorly tolerated with mucositis, nausea, poor oral intake. CT with stable to positive response.      Received C2 12/1/21 (delayed for tolerance issues) with 10% dose reduction.     Received C3 1/4/22 with same 10% dose reduction. He had issues with nausea inpatient along  with recurrent thrush.     Received C4 2/2/22 with same 10% dose reduction. Had more significant neurotoxicity so only received 5.5 days. Symptoms resolved after stopping Ifosfamide.     Interval History:  Ifrah was called today for follow-up. He felt horrible the end of this last cycle. He notes that the neuro toxicity started a day before they actually stopped his ifos -he was having hallucinations but didn't tell anyone. He slept the first two days he was home and felt very lightheaded. Now he is feeling better. He notes improvement with the IVF and emend/dex last week.    He denies fevers or chills. No headaches. No ongoing dizziness. No ongoing neuro concerns. His mouth is still sore and he notes mild thrush, finished his fluconazole today. He tried Orajel which burned. He is eating and drinking OK.     He denies chest pain. He has WALKER, especially stairs. His cough is unchanged. His cancer back/chest wall pain is completely resolved and he is off Celebrex and not needing anything for pain.    His nausea is controlled with Zyprexa at night and Zofran/Compazine during the day. Taking PPI. Still having some diarrhea but pepto bismol helped. No abdominal pain. No urinary symptoms, though he is urinating frequently.    Edema is improved. He has had dry, blistering skin on his hands and he also has circular patches of red, dry skin on his arms and legs. He has had this prior cycles as well. No bleeding issues.    Has slight neuropathy in his feet. Mood is better, he is on lexapro and meeting with Thuan Hirsch. He admits his mental health struggles when he is in the hospital for treatment.     Current Outpatient Medications   Medication Sig Dispense Refill     acetaminophen (TYLENOL) 500 MG tablet Take 500-1,000 mg by mouth every 6 hours as needed for mild pain       calcium carbonate (TUMS) 500 MG chewable tablet Take 1 tablet (500 mg) by mouth 3 times daily as needed for heartburn       celecoxib (CELEBREX) 200 MG  "capsule Take 1 capsule (200 mg) by mouth 2 times daily as needed for moderate pain . Note this is a new a strength! Only one capsule at a time! 60 capsule 3     cholecalciferol (VITAMIN D-1000 MAX ST) 1000 units TABS Take 1,000 Units by mouth daily        doxepin (SINEQUAN) 10 MG/ML (HIGH CONC) solution Take 2 mLs (20 mg) by mouth every 4 hours as needed for other (mouth pain) . Mix with equal parts tap water. Swish and hold in mouth ~60 seconds then spit out. 118 mL 3     escitalopram (LEXAPRO) 10 MG tablet Take 1 tablet (10 mg) by mouth daily 30 tablet 1     fluconazole (DIFLUCAN) 200 MG tablet Take 1 tablet (200 mg) by mouth daily 7 tablet 0     guaiFENesin-codeine (GUAIFENESIN AC) 100-10 MG/5ML syrup Take 10 mLs by mouth every 4 hours as needed for cough 118 mL 0     hydrocortisone (CORTEF) 10 MG tablet Take 20 mg in the morning and 10 mg in the afternoon 270 tablet 3     Insulin Syringe-Needle U-100 27.5G X 5/8\" 2 ML MISC 1 each daily as needed (with solucortef for adrenal crisis) 10 each 1     levothyroxine (SYNTHROID/LEVOTHROID) 75 MCG tablet Take 1 tablet (75 mcg) by mouth every morning 90 tablet 3     LORazepam (ATIVAN) 0.5 MG tablet Take 1 tablet (0.5 mg) by mouth every 4 hours as needed for nausea or vomiting . Caution: causes sedation. 30 tablet 0     Menthol-Methyl Salicylate (DALIA MALIK GREASELESS) cream Apply topically every 6 hours as needed       metroNIDAZOLE (METROGEL) 1 % external gel Apply topically daily .Try stronger dose due to increased symptoms after chemo. 30 g 0     nystatin (MYCOSTATIN) 487706 UNIT/ML suspension Take 5 mLs (500,000 Units) by mouth 4 times daily Hold while taking Fluconazole, could resume after Fluconazole if you have ongoing coating on tongue. 473 mL 0     OLANZapine (ZYPREXA) 5 MG tablet Take 1 tablet (5 mg) by mouth At Bedtime Continue until your nausea resolves 30 tablet 1     omeprazole (PRILOSEC) 20 MG DR capsule Take 2 capsules (40 mg) by mouth daily 60 capsule 1     " ondansetron (ZOFRAN) 4 MG tablet Take 1-2 tablets (4-8 mg) by mouth every 8 hours as needed for nausea 60 tablet 3     phosphorus tablet 250 mg (PHOSPHA 250 NEUTRAL) 250 MG per tablet Take 2 tablets (500 mg) by mouth daily 60 tablet 1     potassium chloride ER (K-TAB) 20 MEQ CR tablet Take 1 tablet (20 mEq) by mouth daily 60 tablet 1     prochlorperazine (COMPAZINE) 5 MG tablet Take 1 tablet (5 mg) by mouth every 6 hours as needed for nausea or vomiting . Caution: causes sedation. 30 tablet 1     SUMAtriptan (IMITREX) 50 MG tablet Take 1 tablet (50 mg) by mouth at onset of headache for migraine May repeat in 2 hours. Max 4 tablets/24 hours. 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     Thyroid disease     removed pituitary gland     Past Surgical History:   Procedure Laterality Date     COLONOSCOPY N/A 12/17/2020    Procedure: COLONOSCOPY;  Surgeon: Ken Camacho MD;  Location: [facility]     ENT SURGERY       HERNIA REPAIR       INSERT PORT VASCULAR ACCESS Right 1/28/2022    Procedure: INSERTION, VASCULAR ACCESS PORT;  Surgeon: Daniel Kinney MD;  [place] [facility]     IR CHEST PORT PLACEMENT > 5 YRS OF AGE  1/28/2022     LARYNGOSCOPY, EXCISE VOCAL CORD LESION MICROSCOPIC, COMBINED Left 07/01/2021    Procedure: MICROLARYNGOSCOPY, LEFT TRUE VOCAL CORD INJECTION WITH PROLARYN;  Surgeon: Kyree Bearden MD;  Location: [facility]     PHACOEMULSIFICATION WITH STANDARD INTRAOCULAR LENS IMPLANT Right 03/10/2021    Procedure: Cataract removal with implant.;  Surgeon: Jamir Mac MD;  Location: [facility]     PHACOEMULSIFICATION WITH STANDARD INTRAOCULAR LENS IMPLANT Left 04/05/2021    Procedure: Cataract removal with implant.;  Surgeon: Jamir Mac MD;  Location: [facility]     PICC DOUBLE LUMEN  PLACEMENT Right 12/01/2021    5FR DL PICC, basilic vein. L-38cm, 1cm out.     PICC DOUBLE LUMEN PLACEMENT Right 01/04/2022    Right cephalic, 41 cm, 1 external length     PITUITARY EXCISION       tooth pulled 4/7  Right      Allergies   Allergen Reactions     Penicillins Hives and Swelling            Social History   Social History     Tobacco Use     Smoking status: Never Smoker     Smokeless tobacco: Never Used   Substance Use Topics     Alcohol use: Yes     Comment: rare     Drug use: Never      Past medical history and social history were reviewed.    Physical Examination:  There were no vitals taken for this visit.  Wt Readings from Last 10 Encounters:   02/08/22 63 kg (138 lb 14.4 oz)   01/28/22 65.8 kg (145 lb)   01/11/22 65.1 kg (143 lb 8 oz)   12/09/21 66.7 kg (147 lb)   12/09/21 67 kg (147 lb 11.2 oz)   12/08/21 64.8 kg (142 lb 13.7 oz)   11/12/21 68 kg (150 lb)   11/11/21 69.9 kg (154 lb)   11/04/21 70 kg (154 lb 6.4 oz)   11/02/21 85.9 kg (189 lb 6.4 oz)     Video physical exam  General: Patient appears well in no acute distress.   Skin: No visualized rash or lesions on visualized skin  Eyes: EOMI, no erythema, sclera icterus or discharge noted  Resp: Appears to be breathing comfortably without accessory muscle usage, speaking in full sentences, no cough  MSK: Appears to have normal range of motion based on visualized movements  Neurologic: No apparent tremors, facial movements symmetric  Psych: affect normal, alert and oriented    The rest of a comprehensive physical examination is deferred due to PHE (public health emergency) video restrictions    Laboratory Data:  Results for MARISOL XIE (MRN [id number]) as of 2/15/2022 10:27   2/14/2022 09:01   Sodium 142   Potassium 3.0 (L)   Chloride 112 (H)   Carbon Dioxide 26   Urea Nitrogen 18   Creatinine 0.96   GFR Estimate 83   Calcium 9.1   Anion Gap 4   Magnesium 2.1   Phosphorus 2.1 (L)   Albumin 3.4   Protein Total 6.4 (L)   Bilirubin Total 0.4    Alkaline Phosphatase 179 (H)   ALT 50   AST 17   Glucose 94   WBC 1.8 (L)   Hemoglobin 7.8 (L)   Hematocrit 24.5 (L)   Platelet Count 53 (L)   RBC Count 2.56 (L)   MCV 96   MCH 30.5   MCHC 31.8   RDW 15.2 (H)   % Neutrophils 37   % Lymphocytes 58   % Monocytes 5   % Eosinophils 0   % Basophils 0   Absolute Basophils 0.0   Absolute Neutrophil 0.7 (L)   Absolute Lymphocytes 1.0   Absolute Monocytes 0.1   Absolute Eosinophils 0.0   RBC Morphology Confirmed RBC Indices   Platelet Morphology Automated Count Confirmed. Giant platelets are present. (A)   Reactive Lymphs Present (A)       Assessment and Plan:  1. 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 High PD-L1. Was on Keytruda but had progression, now on Doxil + Ifos started 10/26/21. Port placed 1/28/22.     Did not tolerate well-had significant mucositis, poor PO intake, nausea, and electrolyte derrangements. He has had less cancer pain and CT with stable disease. Cycle 2 given 12/1/21 with 10% dose reduction and did better. Cycle 3 given 1/4/22 at same dose as C2- did have nausea and hypokalemia/hypophos. Cycle 4 given 2/2/22 at same dose as C3-more issues with nausea and dehydration, also had significant neurotoxicity on day 5 and treatment stopped early.    He is doing better today though still struggling from cycle 4. See supportive cares below. Plan for CT in the upcoming week-clinically he continues to note improvement in cancer pain. Discussed with Dr. Wolff and will wait to determine Doxil alone vs Doxil + Ifos with further dose reduction for cycle 5.    MARLEEN visit next week. 2x weekly labs and infusions in [place] scheduled.      2. GI  Dyspepsia: Continue Omeprazole 40mg daily. Continue Tums PRN     CINV: Continue Zofran daily and Zyprexa at bedtime.Continue Compazine and Lorazepam PRN. Continue Emend inpatient on day 1 and day 6. Did require Dex/Emend last week which helped.      LFT elevation: 2/2 statin,  improving     Gas: Continue Gas-x PRN      Diarrhea: Inpatient issue, C. Diff was negative. Imodium and Pepto Bismol PRN.      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 Cal Saba endocrine.     Hypothyroidism continue Synthroid 75mcg daily.       4. Derm  Rosacea: Long standing issue, continue Metrogel PRN     Hand/foot: 2/2 Doxil, icing hands and feet. Continue emollients at home. Add Kenalog cream for bothersome/blistering and red areas. Pending improvement may need to consider another dose reduction for Doxil.      5. MSK  Left shoulder/back/chest wall pain 2/2 tumor, following with palliative. No pain currently, off all pain medications.       6. ENT  Hoarseness: Thought 2/2 mediastinal mass vs irritation from prior biopsy. Following with ENT, s/p vocal cord infection 7/1/21. Following with voice clinic. This is improved.     Mucositis/Thrush: Significant, 2/2 Doxil. Better with dose reduction. Continue salt/soda rinses. Extend fluconazole course by one week given ongoing symptoms and neutropenia. Nystatin PRN, will restart consistently once completed fluconazole. Avoid orajel.      7. Pulm/Cards  Dyspnea with talking, prior work-up with CT PE negative for PE, infection, pneumonitis; troponin negative; COVID negative. Sating well on RA. Agree with speech pathology thoughts on laryngeal dysfunction. Symptoms improved.       Continue Guaifenesin-Coedine PRN for cough, much improved.      Saw cardiology, echo WNL. Has mild CAD, recommended to start on statin however now HELD for LFT elevation, improving.      Had elevated BP inpatient. Monitor. Hesitant to start antihypertensives given prior issues with dehydration and lightheadedness.      Continue compression stockings for edema, improving.    Will check EKG on Thursday this week given multiple QT prolonging medications    8. FEN  Hypokalemia: 2/2 Ifos and poor PO intake. Currently on 40meq daily. Received potassium in infusion  today, slowly improving. Continue on current dose and twice weekly montioring.      Hypophosphatemia: 2/2 ifos and poor PO intake. Currently on 500mg daily. Phos stable and he is eating better so keep on current dose and twice weekly monitoring.      Poor PO intake: 2/2 mucositis and nausea as above. Was poor with thrush, now improved.      Dehydration: Twice weekly fluids at [place], edema improved.      9. Psych/Neuro  Anxiety/depression: Hx of issues, recurrent issue when he was feeling poorly from treatment. Started on Lexapro by inpatient team which has helped (checking EKG for QT monitoring as above).      Continue PRN Imitrex for migraines.     Had neuro toxicity inpatient with cycle 4 (monoclonic jerking, syncope, halluncinations), now resolved. Will determine dose adjustments vs holding after seeing how he recovers this cycle and how his CT looks in a few weeks    10. Heme  Pancytopenia 2/2 chemotherapy. Hgb at 7.8, symptomatic with WALKER. Will recheck Thursday and if <8 will give 1 unit pRBC. Recheck plt and ANC on Thursday, monitor for bleeding or fevers.    FINAL PLAN  -Continue current home meds as is. Add Kenalog cream for rash  -Recheck labs (CBC, CMP, Mag, Phos) Thursday 2/17  -Give 1 unit pRBC if hgb <8. If hgb improved then can get IVF  -EKG Thursday 2/17 in [place]   -Follow-up with MARLEEN in one week virtually     45 minutes spent on the date of the encounter doing chart review, review of test results, interpretation of tests, patient visit, documentation and discussion with other provider(s)     Maynor Rios PA-C  Department of Hematology and Oncology  [facility]

## 2022-02-08 NOTE — PLAN OF CARE
Discharge to Home:  D: Orders for discharge and outpatient medications written. Pt verbalized readiness to discharge to home this afternoon.  I: Port heparin locked and de-accessed per protocol. Home medications and return to clinic schedule reviewed with patient and his wife Rachel. Discharge instructions and parameters for calling Health Care Provider reviewed. Patient left at 12:45 pm via wheelchair accompanied by his wife.   A: Patient/spouse verbalized understanding of all discharge medications and discharge paperwork and was ready for discharge.   P: Patient instructed to  medications at his primary pharmacy. Follow up as scheduled/documented on discharge paperwork.

## 2022-02-09 ENCOUNTER — PATIENT OUTREACH (OUTPATIENT)
Dept: NURSING | Facility: CLINIC | Age: 72
End: 2022-02-09
Payer: MEDICARE

## 2022-02-09 ENCOUNTER — PATIENT OUTREACH (OUTPATIENT)
Dept: ONCOLOGY | Facility: CLINIC | Age: 72
End: 2022-02-09

## 2022-02-09 DIAGNOSIS — C49.9 SPINDLE CELL SARCOMA (H): Primary | ICD-10-CM

## 2022-02-09 ASSESSMENT — ACTIVITIES OF DAILY LIVING (ADL): DEPENDENT_IADLS:: INDEPENDENT

## 2022-02-09 NOTE — PROGRESS NOTES
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)  # Neurotoxicity, secondary to ifosfamide  # Cancer-related pain       Clinic Utilization  Difficulty keeping appointments:: No  Compliance Concerns: No  No-Show Concerns: No  No PCP office visit in Past Year: No  Utilization    Hospital Admissions  9             ED Visits  3             No Show Count (past year)  0                Current as of: 2/8/2022 10:30 PM            Clinical Concerns:  Current Medical Concerns:    Patient reports he is pretty much bedridden due to the chemotherapy really hit him hard this time  When CC RN asked if he needed additional help in the home he states his daughter and his wife is taking good care of him  Continues to have thrush in his mouth for a total of 4 months and they continue to adjust the medication   Patient continues to follow the Palliative care team and this has been helpful  Weight 148# today and reports poor appetite  Patient plans to start the new medication added  Lexapro  Patient is no longer using the Celebrex   Current Behavioral Concerns: No    Education Provided to patient: Call Ca triage with questions or concerns    Pain  Pain (GOAL):: No  Health Maintenance Reviewed: Due/Overdue   Health Maintenance Due   Topic Date Due     URINE DRUG SCREEN  Never done     ADVANCE CARE PLANNING  Never done      Pneumococcal Vaccine: 65+ Years (1 of 4 - PCV13) Never done     HEPATITIS C SCREENING  Never done     LIPID  Never done     ZOSTER IMMUNIZATION (1 of 2) Never done     MEDICARE ANNUAL WELLNESS VISIT  Never done     DTAP/TDAP/TD IMMUNIZATION (2 - Td or Tdap) 11/30/2016     COVID-19 Vaccine (3 - Moderna risk 4-dose series) 05/13/2021     INFLUENZA VACCINE (1) Never done     PHQ-2  01/01/2022     Clinical Pathway: None    Medication Management:  Medication review status: Medications reviewed.  Changes noted per patient report.       Functional Status:  Dependent ADLs:: Independent  Dependent IADLs:: Independent  Bed or wheelchair confined:: No  Mobility Status: Independent    Living Situation:  Current living arrangement:: I live in a private home with spouse    Lifestyle & Psychosocial Needs:    Social Determinants of Health     Tobacco Use: Low Risk      Smoking Tobacco Use: Never Smoker     Smokeless Tobacco Use: Never Used   Alcohol Use: Not on file   Financial Resource Strain: Low Risk      Difficulty of Paying Living Expenses: Not very hard   Food Insecurity: No Food Insecurity     Worried About Running Out of Food in the Last Year: Never true     Ran Out of Food in the Last Year: Never true   Transportation Needs: No Transportation Needs     Lack of Transportation (Medical): No     Lack of Transportation (Non-Medical): No   Physical Activity: Inactive     Days of Exercise per Week: 0 days     Minutes of Exercise per Session: 0 min   Stress: Not on file   Social Connections: Moderately Integrated     Frequency of Communication with Friends and Family: Twice a week     Frequency of Social Gatherings with Friends and Family: Twice a week     Attends Restoration Services: Never     Active Member of Clubs or Organizations: Yes     Attends Club or Organization Meetings: 1 to 4 times per year     Marital Status:    Intimate Partner Violence: Unknown     Fear of Current or Ex-Partner: No     Emotionally Abused: No  "    Physically Abused: Not on file     Sexually Abused: No   Depression: Not at risk     PHQ-2 Score: 0   Housing Stability: Not on file     Diet:: Regular  Inadequate nutrition (GOAL):: No  Tube Feeding: No  Inadequate activity/exercise (GOAL):: No  Significant changes in sleep pattern (GOAL): No  Transportation means:: Family     Sikh or spiritual beliefs that impact treatment:: No  Mental health DX:: No  Mental health management concern (GOAL):: No  Chemical Dependency Status: No Current Concerns  Informal Support system:: Spouse       Resources and Interventions:  Current Resources:      Community Resources: OP Infusion  Supplies used at home:: None  Equipment Currently Used at Home: none  Employment Status: employed full-time      Advance Care Plan/Directive  Advanced Care Plans/Directives on file:: No    Referrals Placed: None     Goals:   Goals        General     Medical (pt-stated)      Notes - Note edited  2/9/2022 10:48 AM by Oliva García RN     Goal Statement: I want to \"whip\" this cancer   Date Goal set: 11/3/2021  Barriers: None identified   Strengths: Motivated and great family support   Date to Achieve By: 6/3/2022  Patient expressed understanding of goal: Yes  Action steps to achieve this goal:  1. I will keep my future  Primary care provider,ENT,Endocrinology and Oncology appointments   2. I will take my medications as directed and keep future Chemotherapy appointments               Patient/Caregiver understanding: Patient expresses good understanding of     Outreach Frequency: monthly  Future Appointments              Tomorrow [place] FAST TRACK LAB [facility] LAK    Tomorrow ROOM 9 [place]; [place] CANCER INFUSION NURSE [facility] MARIA ELENA    In 5 days [place] FAST TRACK LAB [facility] LAK    In 5 days ROOM 9 [place]; [place] CANCER INFUSION NURSE [facility] MARIA ELENA    In 5 " days Tasha Bowie, SLP M [facility]    In 5 days WyMaynor heath PA [facility] Cancer [facility] RID    In 1 week [place] FAST TRACK LAB M [facility] Cancer [facility] LAK    In 1 week ROOM 4 [place]; [place] CANCER INFUSION NURSE M [facility] Cancer [facility] LAK    In 1 week [place] FAST TRACK LAB M [facility] Cancer [facility] LAK    In 1 week ROOM 2 [place]; [place] CANCER INFUSION NURSE M [facility] Cancer [facility] LAK    In 2 weeks [place] FAST TRACK LAB M [facility] Cancer [facility] LAK    In 2 weeks ROOM 6 [place]; [place] CANCER INFUSION NURSE M [facility] Cancer [facility] LAK    In 2 weeks [place] FAST TRACK LAB M [facility] LAK    In 2 weeks ROOM 9 [place]; [place] CANCER INFUSION NURSE M [facility] Cancer [facility] LAK    In 3 weeks [place] FAST TRACK LAB M [facility] Cancer [facility] LAK    In 3 weeks ROOM 2 [place]; [place] CANCER INFUSION NURSE M [facility] Cancer [facility] LAK    In 3 weeks [place] FAST TRACK LAB [facility] Cancer [facility] LAK    In 3 weeks ROOM 2 [place]; [place] CANCER INFUSION NURSE M [facility] Cancer [facility] LAK    In 4 weeks Sal Handy MD [facility]    In 4 weeks [place] FAST TRACK LAB [facility] Cancer [facility] LAK    In 4 weeks ROOM 4 [place]; [place] CANCER INFUSION NURSE [facility] Cancer [facility] LAK    In 4 weeks Sherry Esquivel MD [facility]    In 2 months [place] CANCER INFUSION NURSE; [place] FAST TRACK LAB M [facility] Cancer [facility] LAK    In 3 months Jamir Grant MD [facility] Endocrinology, [facility]          Plan:   1. Patient will keep future infusion  ,Oncology and ENT appointments  2. Patient will call Oncology office with questions or concerns    3. CC RN will follow up monthly     [facility]   Oliva García RN, Care Coordinator   [facility]   E-mail [email or web address]   [phone]

## 2022-02-09 NOTE — LETTER
[facility]  Patient Centered Plan of Care  [facility] CARE COORDINATION  [address] [facility] [address]  February 9, 2022        Ifrah Huitron  [address] [place] [address]          Dear Karen Rg is an updated Patient Centered Plan of Care for your continued enrollment in Care Coordination. Please let us know if you have additional questions, concerns, or goals that we can assist with.    Sincerely,    [facility]   Oliva García RN, Care Coordinator   [facility]'s   E-mail [email or web address]   [phone]    About Me:        Patient Name:  Ifrah Huitron    YOB: 1950  Age:         71 year old   [place] MRN:    [id number] Telephone Information:  Home Phone [phone]   Mobile [phone]       Address:  [address] [place] [address] Email address:  [email or web address]      Emergency Contact(s)    Name Relationship Lgl Grd Work Phone Home Phone Mobile Phone   1. SARA HUITRON Spouse   [phone]   2. ALLI HUITRON Other   [phone]            Primary language:  English     needed? No   [facility] [employer]:  [phone] op. 1  Other communication barriers:Glasses    Preferred Method of Communication:     Current living arrangement: I live in a private home with spouse    Mobility Status/ Medical Equipment: Independent        Health Maintenance  Health Maintenance Reviewed: Due/Overdue   Health Maintenance Due   Topic Date Due     URINE DRUG SCREEN  Never done     ADVANCE CARE PLANNING  Never done     Pneumococcal Vaccine: 65+ Years (1 of 4 - PCV13) Never done     HEPATITIS C SCREENING  Never done     LIPID  Never done     ZOSTER IMMUNIZATION (1 of 2) Never done     MEDICARE ANNUAL WELLNESS VISIT  Never done     DTAP/TDAP/TD IMMUNIZATION (2 - Td or Tdap) 11/30/2016     COVID-19 Vaccine (3 - Moderna risk 4-dose series) 05/13/2021      INFLUENZA VACCINE (1) Never done     PHQ-2  01/01/2022         My Access Plan  Medical Emergency 911   Primary Clinic Line [facility] - [phone]   24 Hour Appointment Line [phone] or  [phone]) (toll-free)   24 Hour Nurse Line [phone] (toll-free)   Preferred Urgent Care [facility], [phone]     Preferred Hospital [facility]  [phone]     Preferred Pharmacy [facility] [address] [facility]     Behavioral Health Crisis Line The National Suicide Prevention Lifeline at 1-215.559.4508 or 911             My Care Team Members  Patient Care Team       Relationship Specialty Notifications Start End    Sukhdev Kohler MD PCP - General Family Practice  5/23/19     Phone: [phone] Fax: [phone]         [address] [facility] [address]    Sukhdev Kohler MD Assigned PCP   4/26/19     Phone: [phone] Fax: [phone]         [address] [facility] [address]    David Hope MD Assigned Pulmonology Provider   4/18/21     Phone: [phone] Fax: [phone]         [address] [facility] [address] [facility] [address]    Luke Wolff MD MD Medical Oncology All results, Admissions 5/27/21     Phone: [phone] Fax: [phone]         [address] [facility] [address] [facility] [address]    Kyree Bearden MD Assigned Surgical Provider   6/27/21     Phone: [phone] Fax: [phone]         [address] [facility] [address]    Fabiola Armstrong PA-C Physician Assistant Dermatology  7/12/21     Attending provider    Phone: [phone] Fax: [phone] [address] [facility] [address]    Sal Handy MD Assigned Palliative Care Provider   7/16/21     Phone: [phone] Fax: [phone]         [address] [place] [address], [id number] Palliative Care [facility]  "[id number]    Maynor Rois PA Assigned Cancer Care Provider   7/18/21     Phone: [phone] Fax: [phone]         [address] [facility] [address]    Jay Jay Hernandez MD Cardiologist Cardiovascular Disease  9/10/21     Phone: [phone] Fax: [phone]         [address] [address]    Jay Jay Hernandez MD Assigned Heart and Vascular Provider   9/26/21     Phone: [phone] Fax: [phone]         [address] [address]    Jamir Grant MD MD Endocrinology, Diabetes, and Metabolism  10/5/21     Phone: [phone] Fax: [phone] [address] [facility] [address]    Luiz Castaneda RN Specialty Care Coordinator Hematology & Oncology Admissions 11/1/21     Phone: [phone] Pager: [phone]        Oliva García RN Lead Care Coordinator Primary Care - CC Admissions 11/3/21     Phone: [phone]         Jamir Grant MD Assigned Endocrinology Provider   11/14/21     Phone: [phone] Fax: [phone]         [facility] [address]            My Care Plans  Self Management and Treatment Plan  Goals and (Comments)  Goals        General     Medical (pt-stated)      Notes - Note edited  2/9/2022 10:48 AM by Oliva García RN     Goal Statement: I want to \"whip\" this cancer   Date Goal set: 11/3/2021  Barriers: None identified   Strengths: Motivated and great family support   Date to Achieve By: 6/3/2022  Patient expressed understanding of goal: Yes  Action steps to achieve this goal:  1. I will keep my future  Primary care provider,ENT,Endocrinology and Oncology appointments   2. I will take my medications as directed and keep future Chemotherapy appointments                Action Plans on File:    None     Advance Care Plans/Directives Type:   No data recorded    My Medical and Care Information  Problem List   Patient Active Problem List   Diagnosis     Calculus " "of kidney     Degeneration of lumbar or lumbosacral intervertebral disc     Eczema     Epidural lipomatosis     TUYET (generalized anxiety disorder)     Hypopituitarism (H)     Hypothyroidism     Osteoarthritis of spine with radiculopathy, lumbar region     Pituitary macroadenoma (H)     Rosacea     Chronic lower back pain     Spindle cell sarcoma (H)     Mesothelioma, malignant (H)     Vocal fold paralysis, left     Dysphonia     Muscle tension dysphonia     Mediastinal lymphadenopathy     Inguinal hernia     Chemotherapy-induced nausea     Hypophosphatemia     Anemia     Chemotherapy-induced neutropenia (H)     Sarcoma (H)     Encounter for other specified aftercare     Thrush      Current Medications and Allergies:    Current Outpatient Medications   Medication     acetaminophen (TYLENOL) 500 MG tablet     calcium carbonate (TUMS) 500 MG chewable tablet     celecoxib (CELEBREX) 200 MG capsule     cholecalciferol (VITAMIN D-1000 MAX ST) 1000 units TABS     doxepin (SINEQUAN) 10 MG/ML (HIGH CONC) solution     escitalopram (LEXAPRO) 10 MG tablet     fluconazole (DIFLUCAN) 200 MG tablet     guaiFENesin-codeine (GUAIFENESIN AC) 100-10 MG/5ML syrup     hydrocortisone (CORTEF) 10 MG tablet     Insulin Syringe-Needle U-100 27.5G X 5/8\" 2 ML MISC     levothyroxine (SYNTHROID/LEVOTHROID) 75 MCG tablet     LORazepam (ATIVAN) 0.5 MG tablet     Menthol-Methyl Salicylate (DALIA MALIK GREASELESS) cream     metroNIDAZOLE (METROGEL) 1 % external gel     nystatin (MYCOSTATIN) 541681 UNIT/ML suspension     OLANZapine (ZYPREXA) 5 MG tablet     omeprazole (PRILOSEC) 20 MG DR capsule     ondansetron (ZOFRAN) 4 MG tablet     phosphorus tablet 250 mg (PHOSPHA 250 NEUTRAL) 250 MG per tablet     potassium chloride ER (K-TAB) 20 MEQ CR tablet     prochlorperazine (COMPAZINE) 5 MG tablet     SUMAtriptan (IMITREX) 50 MG tablet     triamcinolone (KENALOG) 0.1 % external cream     No current facility-administered medications for this visit. "       Care Coordination Start Date: 11/3/2021   Frequency of Care Coordination: monthly     Form Last Updated: 02/09/2022

## 2022-02-10 ENCOUNTER — LAB (OUTPATIENT)
Dept: INFUSION THERAPY | Facility: CLINIC | Age: 72
End: 2022-02-10
Attending: PHYSICIAN ASSISTANT
Payer: MEDICARE

## 2022-02-10 VITALS
DIASTOLIC BLOOD PRESSURE: 89 MMHG | TEMPERATURE: 98.4 F | HEART RATE: 79 BPM | SYSTOLIC BLOOD PRESSURE: 164 MMHG | RESPIRATION RATE: 18 BRPM

## 2022-02-10 DIAGNOSIS — T45.1X5A CHEMOTHERAPY-INDUCED NAUSEA: ICD-10-CM

## 2022-02-10 DIAGNOSIS — C49.9 SARCOMA (H): ICD-10-CM

## 2022-02-10 DIAGNOSIS — C49.9 SPINDLE CELL SARCOMA (H): ICD-10-CM

## 2022-02-10 DIAGNOSIS — R11.0 CHEMOTHERAPY-INDUCED NAUSEA: ICD-10-CM

## 2022-02-10 DIAGNOSIS — E87.6 HYPOKALEMIA: ICD-10-CM

## 2022-02-10 DIAGNOSIS — C45.9 MESOTHELIOMA, MALIGNANT (H): ICD-10-CM

## 2022-02-10 DIAGNOSIS — Z51.89 ENCOUNTER FOR OTHER SPECIFIED AFTERCARE: ICD-10-CM

## 2022-02-10 DIAGNOSIS — Z51.89 ENCOUNTER FOR OTHER SPECIFIED AFTERCARE: Primary | ICD-10-CM

## 2022-02-10 DIAGNOSIS — T45.1X5A CHEMOTHERAPY-INDUCED NEUTROPENIA (H): ICD-10-CM

## 2022-02-10 DIAGNOSIS — D70.1 CHEMOTHERAPY-INDUCED NEUTROPENIA (H): ICD-10-CM

## 2022-02-10 LAB
ALBUMIN SERPL-MCNC: 3.4 G/DL (ref 3.4–5)
ALP SERPL-CCNC: 185 U/L (ref 40–150)
ALT SERPL W P-5'-P-CCNC: 43 U/L (ref 0–70)
ANION GAP SERPL CALCULATED.3IONS-SCNC: 5 MMOL/L (ref 3–14)
AST SERPL W P-5'-P-CCNC: 31 U/L (ref 0–45)
BASOPHILS # BLD MANUAL: 0 10E3/UL (ref 0–0.2)
BASOPHILS NFR BLD MANUAL: 0 %
BILIRUB SERPL-MCNC: 0.3 MG/DL (ref 0.2–1.3)
BUN SERPL-MCNC: 18 MG/DL (ref 7–30)
CALCIUM SERPL-MCNC: 9.8 MG/DL (ref 8.5–10.1)
CHLORIDE BLD-SCNC: 114 MMOL/L (ref 94–109)
CO2 SERPL-SCNC: 22 MMOL/L (ref 20–32)
CREAT SERPL-MCNC: 1.27 MG/DL (ref 0.66–1.25)
EOSINOPHIL # BLD MANUAL: 0.1 10E3/UL (ref 0–0.7)
EOSINOPHIL NFR BLD MANUAL: 1 %
ERYTHROCYTE [DISTWIDTH] IN BLOOD BY AUTOMATED COUNT: 15.8 % (ref 10–15)
GFR SERPL CREATININE-BSD FRML MDRD: 60 ML/MIN/1.73M2
GLUCOSE BLD-MCNC: 149 MG/DL (ref 70–99)
HCT VFR BLD AUTO: 29.1 % (ref 40–53)
HGB BLD-MCNC: 9.5 G/DL (ref 13.3–17.7)
LYMPHOCYTES # BLD MANUAL: 0.9 10E3/UL (ref 0.8–5.3)
LYMPHOCYTES NFR BLD MANUAL: 17 %
MAGNESIUM SERPL-MCNC: 1.9 MG/DL (ref 1.8–2.6)
MCH RBC QN AUTO: 31.4 PG (ref 26.5–33)
MCHC RBC AUTO-ENTMCNC: 32.6 G/DL (ref 31.5–36.5)
MCV RBC AUTO: 96 FL (ref 78–100)
MONOCYTES # BLD MANUAL: 0.1 10E3/UL (ref 0–1.3)
MONOCYTES NFR BLD MANUAL: 2 %
NEUTROPHILS # BLD MANUAL: 4.1 10E3/UL (ref 1.6–8.3)
NEUTROPHILS NFR BLD MANUAL: 80 %
PHOSPHATE SERPL-MCNC: 2.1 MG/DL (ref 2.5–4.5)
PLAT MORPH BLD: ABNORMAL
PLATELET # BLD AUTO: 208 10E3/UL (ref 150–450)
POTASSIUM BLD-SCNC: 2.7 MMOL/L (ref 3.4–5.3)
PROT SERPL-MCNC: 6.8 G/DL (ref 6.8–8.8)
RBC # BLD AUTO: 3.03 10E6/UL (ref 4.4–5.9)
RBC MORPH BLD: ABNORMAL
SODIUM SERPL-SCNC: 141 MMOL/L (ref 133–144)
VARIANT LYMPHS BLD QL SMEAR: PRESENT
WBC # BLD AUTO: 5.1 10E3/UL (ref 4–11)

## 2022-02-10 PROCEDURE — 36591 DRAW BLOOD OFF VENOUS DEVICE: CPT

## 2022-02-10 PROCEDURE — 82040 ASSAY OF SERUM ALBUMIN: CPT | Performed by: PHYSICIAN ASSISTANT

## 2022-02-10 PROCEDURE — 83735 ASSAY OF MAGNESIUM: CPT | Performed by: PHYSICIAN ASSISTANT

## 2022-02-10 PROCEDURE — 84100 ASSAY OF PHOSPHORUS: CPT | Performed by: PHYSICIAN ASSISTANT

## 2022-02-10 PROCEDURE — 96365 THER/PROPH/DIAG IV INF INIT: CPT

## 2022-02-10 PROCEDURE — 80053 COMPREHEN METABOLIC PANEL: CPT | Performed by: PHYSICIAN ASSISTANT

## 2022-02-10 PROCEDURE — 258N000003 HC RX IP 258 OP 636: Performed by: PHYSICIAN ASSISTANT

## 2022-02-10 PROCEDURE — 250N000011 HC RX IP 250 OP 636: Performed by: PHYSICIAN ASSISTANT

## 2022-02-10 PROCEDURE — 250N000013 HC RX MED GY IP 250 OP 250 PS 637: Performed by: PHYSICIAN ASSISTANT

## 2022-02-10 PROCEDURE — 96361 HYDRATE IV INFUSION ADD-ON: CPT

## 2022-02-10 PROCEDURE — 85027 COMPLETE CBC AUTOMATED: CPT | Performed by: PHYSICIAN ASSISTANT

## 2022-02-10 PROCEDURE — 96372 THER/PROPH/DIAG INJ SC/IM: CPT | Performed by: PHYSICIAN ASSISTANT

## 2022-02-10 RX ORDER — POTASSIUM CHLORIDE 1500 MG/1
20 TABLET, EXTENDED RELEASE ORAL 2 TIMES DAILY
Qty: 60 TABLET | Refills: 1 | Status: SHIPPED | OUTPATIENT
Start: 2022-02-10 | End: 2022-02-14

## 2022-02-10 RX ORDER — POTASSIUM CHLORIDE 1500 MG/1
40 TABLET, EXTENDED RELEASE ORAL
Status: COMPLETED | OUTPATIENT
Start: 2022-02-10 | End: 2022-02-10

## 2022-02-10 RX ORDER — HEPARIN SODIUM (PORCINE) LOCK FLUSH IV SOLN 100 UNIT/ML 100 UNIT/ML
5 SOLUTION INTRAVENOUS
Status: COMPLETED | OUTPATIENT
Start: 2022-02-10 | End: 2022-02-10

## 2022-02-10 RX ORDER — HEPARIN SODIUM (PORCINE) LOCK FLUSH IV SOLN 100 UNIT/ML 100 UNIT/ML
5 SOLUTION INTRAVENOUS
Status: CANCELLED
Start: 2022-02-10

## 2022-02-10 RX ADMIN — DEXAMETHASONE SODIUM PHOSPHATE: 10 INJECTION, SOLUTION INTRAMUSCULAR; INTRAVENOUS at 11:45

## 2022-02-10 RX ADMIN — POTASSIUM CHLORIDE 40 MEQ: 20 TABLET, EXTENDED RELEASE ORAL at 10:57

## 2022-02-10 RX ADMIN — Medication 5 ML: at 13:05

## 2022-02-10 RX ADMIN — POTASSIUM CHLORIDE 40 MEQ: 20 TABLET, EXTENDED RELEASE ORAL at 13:04

## 2022-02-10 RX ADMIN — PEGFILGRASTIM 6 MG: 6 INJECTION SUBCUTANEOUS at 10:57

## 2022-02-10 RX ADMIN — SODIUM CHLORIDE 1000 ML: 9 INJECTION, SOLUTION INTRAVENOUS at 11:47

## 2022-02-10 RX ADMIN — SODIUM CHLORIDE 1000 ML: 9 INJECTION, SOLUTION INTRAVENOUS at 10:39

## 2022-02-10 NOTE — PATIENT INSTRUCTIONS
Increase your potassium pills to TWICE a day  Take Zofran scheduled every 8 hours (versus as needed)  Continue taking zyprexa at night to help with nausea  Take compazine as needed for nausea    If you continue to feel unwell, dizziness/lightheadedness persist, you need to go to the ER for further evaluation.

## 2022-02-10 NOTE — PROGRESS NOTES
Infusion Nursing Note:  Ifrah Huitron presents today for Neupogen/IVF/anti-emetics/potassium replacement. Patient seen by provider today: No   present during visit today: Not Applicable.    Note:   Patient states he feels worse than when he left the hospital. He states he had to spend the majority of the day in bed yesterday. He has been taking zofran to control his nausea. Nausea typically improves 30 min after taking it. He feels very lightheaded and dizzy and has almost passed out a few times. He has intermittent shooting pain down his arms to his wrists. Patient feels dehydrated. He states he is eating ok but not taking in enough fluids. He has diarrhea 1-2 times/day. He took pepto last night, and it seemed to help him.   He complains of flank pain but denies any burning/frequency/irritation when urinating.    Updated GERALDO Monreal on the above. Orders are as follows  2L IVF today  Dex/Emend today  Replace potassium per protocol. Have patient increase home potassium to BID  Continue taking zofran at night and take zofran on a schedule vs PRN; continue compazine PRN    If patient continues to feel unwell, he needs to go to the ER.     Patient verbalizes understanding of the above.     Intravenous Access:  Implanted Port.    Treatment Conditions:  Lab Results   Component Value Date    HGB 9.5 (L) 02/10/2022    WBC 5.1 02/10/2022    ANEU 4.1 02/10/2022    ANEUTAUTO 3.4 02/07/2022     02/10/2022      Lab Results   Component Value Date     02/10/2022    POTASSIUM 2.7 (L) 02/10/2022    MAG 1.9 02/08/2022    CR 1.27 (H) 02/10/2022    COTY 9.8 02/10/2022    BILITOTAL 0.3 02/10/2022    ALBUMIN 3.4 02/10/2022    ALT 43 02/10/2022    AST 31 02/10/2022     Post Infusion Assessment:  Patient tolerated infusion without incident.  Patient tolerated injection without incident.   Blood return noted pre and post infusion.  Site patent and intact, free from redness, edema or discomfort.  No evidence of  extravasations.  Access discontinued per protocol.     Discharge Plan:   Discharge instructions reviewed with: Patient.  Patient and/or family verbalized understanding of discharge instructions and all questions answered.  AVS to patient via [email or web address].  Patient will return 2/14/2022 for next appointment.   Patient discharged in stable condition accompanied by: self.  Departure Mode: Ambulatory.    Erin Lowe RN

## 2022-02-10 NOTE — PROGRESS NOTES
2/10/22    -2L IVF today  -Increase home potassium to 20meq BID and replace in infusion  -Do emend and Dex in infusion today  -Schedule Zofran during the day, do Compazine PRN, and Zyprexa at night  -Monitor phos for now, continue 500mg daily. Likely will need to increase on Monday  -Recheck labs and do IVF Monday 2/14    Maynor Rios PA-C

## 2022-02-14 ENCOUNTER — LAB (OUTPATIENT)
Dept: INFUSION THERAPY | Facility: CLINIC | Age: 72
End: 2022-02-14
Attending: PHYSICIAN ASSISTANT
Payer: MEDICARE

## 2022-02-14 ENCOUNTER — VIRTUAL VISIT (OUTPATIENT)
Dept: ONCOLOGY | Facility: CLINIC | Age: 72
End: 2022-02-14
Attending: PHYSICIAN ASSISTANT
Payer: MEDICARE

## 2022-02-14 VITALS — TEMPERATURE: 97.5 F | SYSTOLIC BLOOD PRESSURE: 136 MMHG | HEART RATE: 69 BPM | DIASTOLIC BLOOD PRESSURE: 75 MMHG

## 2022-02-14 DIAGNOSIS — C49.9 SPINDLE CELL SARCOMA (H): ICD-10-CM

## 2022-02-14 DIAGNOSIS — C49.9 SARCOMA (H): Primary | ICD-10-CM

## 2022-02-14 DIAGNOSIS — B37.0 THRUSH: ICD-10-CM

## 2022-02-14 DIAGNOSIS — E87.6 HYPOKALEMIA: ICD-10-CM

## 2022-02-14 DIAGNOSIS — T45.1X5A CHEMOTHERAPY-INDUCED NAUSEA: ICD-10-CM

## 2022-02-14 DIAGNOSIS — D63.0 ANEMIA IN NEOPLASTIC DISEASE: ICD-10-CM

## 2022-02-14 DIAGNOSIS — L27.0 DRUG RASH: ICD-10-CM

## 2022-02-14 DIAGNOSIS — R11.0 CHEMOTHERAPY-INDUCED NAUSEA: ICD-10-CM

## 2022-02-14 DIAGNOSIS — C49.9 SPINDLE CELL SARCOMA (H): Primary | ICD-10-CM

## 2022-02-14 LAB
ALBUMIN SERPL-MCNC: 3.4 G/DL (ref 3.4–5)
ALP SERPL-CCNC: 179 U/L (ref 40–150)
ALT SERPL W P-5'-P-CCNC: 50 U/L (ref 0–70)
ANION GAP SERPL CALCULATED.3IONS-SCNC: 4 MMOL/L (ref 3–14)
AST SERPL W P-5'-P-CCNC: 17 U/L (ref 0–45)
BASOPHILS # BLD MANUAL: 0 10E3/UL (ref 0–0.2)
BASOPHILS NFR BLD MANUAL: 0 %
BILIRUB SERPL-MCNC: 0.4 MG/DL (ref 0.2–1.3)
BUN SERPL-MCNC: 18 MG/DL (ref 7–30)
CALCIUM SERPL-MCNC: 9.1 MG/DL (ref 8.5–10.1)
CHLORIDE BLD-SCNC: 112 MMOL/L (ref 94–109)
CO2 SERPL-SCNC: 26 MMOL/L (ref 20–32)
CREAT SERPL-MCNC: 0.96 MG/DL (ref 0.66–1.25)
EOSINOPHIL # BLD MANUAL: 0 10E3/UL (ref 0–0.7)
EOSINOPHIL NFR BLD MANUAL: 0 %
ERYTHROCYTE [DISTWIDTH] IN BLOOD BY AUTOMATED COUNT: 15.2 % (ref 10–15)
GFR SERPL CREATININE-BSD FRML MDRD: 83 ML/MIN/1.73M2
GLUCOSE BLD-MCNC: 94 MG/DL (ref 70–99)
HCT VFR BLD AUTO: 24.5 % (ref 40–53)
HGB BLD-MCNC: 7.8 G/DL (ref 13.3–17.7)
LYMPHOCYTES # BLD MANUAL: 1 10E3/UL (ref 0.8–5.3)
LYMPHOCYTES NFR BLD MANUAL: 58 %
MAGNESIUM SERPL-MCNC: 2.1 MG/DL (ref 1.8–2.6)
MCH RBC QN AUTO: 30.5 PG (ref 26.5–33)
MCHC RBC AUTO-ENTMCNC: 31.8 G/DL (ref 31.5–36.5)
MCV RBC AUTO: 96 FL (ref 78–100)
MONOCYTES # BLD MANUAL: 0.1 10E3/UL (ref 0–1.3)
MONOCYTES NFR BLD MANUAL: 5 %
NEUTROPHILS # BLD MANUAL: 0.7 10E3/UL (ref 1.6–8.3)
NEUTROPHILS NFR BLD MANUAL: 37 %
PHOSPHATE SERPL-MCNC: 2.1 MG/DL (ref 2.5–4.5)
PLAT MORPH BLD: ABNORMAL
PLATELET # BLD AUTO: 53 10E3/UL (ref 150–450)
POTASSIUM BLD-SCNC: 3 MMOL/L (ref 3.4–5.3)
PROT SERPL-MCNC: 6.4 G/DL (ref 6.8–8.8)
RBC # BLD AUTO: 2.56 10E6/UL (ref 4.4–5.9)
RBC MORPH BLD: ABNORMAL
SODIUM SERPL-SCNC: 142 MMOL/L (ref 133–144)
VARIANT LYMPHS BLD QL SMEAR: PRESENT
WBC # BLD AUTO: 1.8 10E3/UL (ref 4–11)

## 2022-02-14 PROCEDURE — 85027 COMPLETE CBC AUTOMATED: CPT | Performed by: PHYSICIAN ASSISTANT

## 2022-02-14 PROCEDURE — 80053 COMPREHEN METABOLIC PANEL: CPT | Performed by: PHYSICIAN ASSISTANT

## 2022-02-14 PROCEDURE — 96360 HYDRATION IV INFUSION INIT: CPT

## 2022-02-14 PROCEDURE — 84100 ASSAY OF PHOSPHORUS: CPT | Performed by: PHYSICIAN ASSISTANT

## 2022-02-14 PROCEDURE — 250N000013 HC RX MED GY IP 250 OP 250 PS 637: Performed by: PHYSICIAN ASSISTANT

## 2022-02-14 PROCEDURE — 83735 ASSAY OF MAGNESIUM: CPT | Performed by: PHYSICIAN ASSISTANT

## 2022-02-14 PROCEDURE — 250N000011 HC RX IP 250 OP 636: Performed by: PHYSICIAN ASSISTANT

## 2022-02-14 PROCEDURE — 258N000003 HC RX IP 258 OP 636: Performed by: PHYSICIAN ASSISTANT

## 2022-02-14 PROCEDURE — 36591 DRAW BLOOD OFF VENOUS DEVICE: CPT

## 2022-02-14 PROCEDURE — 99215 OFFICE O/P EST HI 40 MIN: CPT | Mod: 95 | Performed by: PHYSICIAN ASSISTANT

## 2022-02-14 PROCEDURE — G0463 HOSPITAL OUTPT CLINIC VISIT: HCPCS | Mod: PN,RTG,25 | Performed by: PHYSICIAN ASSISTANT

## 2022-02-14 RX ORDER — FLUCONAZOLE 200 MG/1
200 TABLET ORAL DAILY
Qty: 7 TABLET | Refills: 0 | Status: SHIPPED | OUTPATIENT
Start: 2022-02-14 | End: 2022-02-21

## 2022-02-14 RX ORDER — POTASSIUM CHLORIDE 1500 MG/1
40 TABLET, EXTENDED RELEASE ORAL ONCE
Status: COMPLETED | OUTPATIENT
Start: 2022-02-14 | End: 2022-02-14

## 2022-02-14 RX ORDER — HEPARIN SODIUM,PORCINE 10 UNIT/ML
5 VIAL (ML) INTRAVENOUS
Status: CANCELLED | OUTPATIENT
Start: 2022-02-17

## 2022-02-14 RX ORDER — TRIAMCINOLONE ACETONIDE 1 MG/G
CREAM TOPICAL 2 TIMES DAILY
Qty: 30 G | Refills: 3 | Status: ON HOLD | OUTPATIENT
Start: 2022-02-14 | End: 2022-01-01

## 2022-02-14 RX ORDER — HEPARIN SODIUM (PORCINE) LOCK FLUSH IV SOLN 100 UNIT/ML 100 UNIT/ML
5 SOLUTION INTRAVENOUS
Status: CANCELLED | OUTPATIENT
Start: 2022-02-17

## 2022-02-14 RX ORDER — HEPARIN SODIUM (PORCINE) LOCK FLUSH IV SOLN 100 UNIT/ML 100 UNIT/ML
5 SOLUTION INTRAVENOUS
Status: CANCELLED
Start: 2022-02-14

## 2022-02-14 RX ORDER — POTASSIUM CHLORIDE 1500 MG/1
40 TABLET, EXTENDED RELEASE ORAL DAILY
Qty: 60 TABLET | Refills: 1 | COMMUNITY
Start: 2022-02-14 | End: 2022-02-17

## 2022-02-14 RX ORDER — HEPARIN SODIUM (PORCINE) LOCK FLUSH IV SOLN 100 UNIT/ML 100 UNIT/ML
5 SOLUTION INTRAVENOUS
Status: COMPLETED | OUTPATIENT
Start: 2022-02-14 | End: 2022-02-14

## 2022-02-14 RX ADMIN — Medication 5 ML: at 10:26

## 2022-02-14 RX ADMIN — SODIUM CHLORIDE 1000 ML: 9 INJECTION, SOLUTION INTRAVENOUS at 09:15

## 2022-02-14 RX ADMIN — POTASSIUM CHLORIDE 40 MEQ: 20 TABLET, EXTENDED RELEASE ORAL at 10:00

## 2022-02-14 NOTE — PROGRESS NOTES
Father notified of pt's acceptance to [facility] and educated on plan of care. Verbalizes understanding. Infusion Nursing Note:  Ifrah Huitron presents today for PAC labs and IV fluids    Patient seen by provider today: Pt seeing GERALDO Mullins today at 1:30 pm.   present during visit today: Not Applicable.    Note: N/A.      Intravenous Access:  Labs drawn without difficulty.  Implanted Port.    Treatment Conditions:  Lab Results   Component Value Date    HGB 7.8 (L) 02/14/2022    WBC 1.8 (L) 02/14/2022    ANEU 0.7 (L) 02/14/2022    ANEUTAUTO 3.4 02/07/2022    PLT 53 (L) 02/14/2022      Lab Results   Component Value Date     02/14/2022    POTASSIUM 3.0 (L) 02/14/2022    MAG 1.9 02/10/2022    CR 0.96 02/14/2022    COTY 9.1 02/14/2022    BILITOTAL 0.4 02/14/2022    ALBUMIN 3.4 02/14/2022    ALT 50 02/14/2022    AST 17 02/14/2022     Gave pt oral potassium replacement per his request.  Pt received 40 mEq oral potassium per standing protocol.      Just prior to pt leaving, his ANC came back at 0.7.  Discussed neutropenic precautions with pt as well as calling/going in to ER for temperature over 100.5 or higher.  Pt verbalized understanding of information given.     Also discussed thrombocytopenia with pt for platelet level of 53 today.      Post Infusion Assessment:  Patient tolerated infusion without incident.  Blood return noted pre and post infusion.  Site patent and intact, free from redness, edema or discomfort.  No evidence of extravasations.  Access discontinued per protocol.       Discharge Plan:   Patient discharged in stable condition accompanied by: self.  Departure Mode: Ambulatory.  Pt to return on 2/17/22 at 10:15 am for pac labs followed by possible fluids.      Cathy Mann RN

## 2022-02-14 NOTE — LETTER
2/14/2022         RE: Ifrah Huitron  [address] [address] [facility] [address]        Dear Colleague,    Thank you for referring your patient, Ifrah Huitron, to the [facility]. Please see a copy of my visit note below.    Ifrah is a 71 year old who is being evaluated via a billable video visit.      How would you like to obtain your AVS? MyChart  If the video visit is dropped, the invitation should be resent by: Text to cell phone: [phone]  Will anyone else be joining your video visit? No Wife is with him.    Amanda Morgan VF        Video Start Time: 1:30pm    Video-Visit Details    Type of service:  Video Visit    Video End Time: 2:00pm    Originating Location (pt. Location): Home    Distant Location (provider location):  [facility]     [facility]    Oncology/Hematology Visit Note  Feb 14, 2022    Reason for Visit: Follow up of spindle cell sarcoma     History of Present Illness: Ifrah Huitron is a 71 year old male with PMH rosacea, pituitary macroadenoma s/p resection, GERD, kidney stones, DJD with metastatic spindle cell sarcoma. He presented to his PCP March 2021 with chest pain/left shoulder and back pain that led to imaging which revealed multiple lung mets. There were difficulties in getting diagnostic biopsy, eventually biopsy of mediastinal mass with spindle cell sarcoma. There was high PD-L1 expression (90%). Baseline imaging 6/21/21 with progression of lung mets and left-sided subdiaphragmatic mass, stable liver lesions. 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      He started Keytruda 6/21/21. CT 8/2/21 with positive response to treatment. CT 10/18/21 with mixed response- LUQ mass larger, anterior mediastinal and left anterior pleural mass smaller. Keytruda stopped.     Started on Doxil + Ifosfamide 10/26/21. Poorly tolerated with  mucositis, nausea, poor oral intake. CT with stable to positive response.      Received C2 12/1/21 (delayed for tolerance issues) with 10% dose reduction.     Received C3 1/4/22 with same 10% dose reduction. He had issues with nausea inpatient along with recurrent thrush.     Received C4 2/2/22 with same 10% dose reduction. Had more significant neurotoxicity so only received 5.5 days. Symptoms resolved after stopping Ifosfamide.     Interval History:  Ifrah was called today for follow-up. He felt horrible the end of this last cycle. He notes that the neuro toxicity started a day before they actually stopped his ifos -he was having hallucinations but didn't tell anyone. He slept the first two days he was home and felt very lightheaded. Now he is feeling better. He notes improvement with the IVF and emend/dex last week.    He denies fevers or chills. No headaches. No ongoing dizziness. No ongoing neuro concerns. His mouth is still sore and he notes mild thrush, finished his fluconazole today. He tried Orajel which burned. He is eating and drinking OK.     He denies chest pain. He has WALKER, especially stairs. His cough is unchanged. His cancer back/chest wall pain is completely resolved and he is off Celebrex and not needing anything for pain.    His nausea is controlled with Zyprexa at night and Zofran/Compazine during the day. Taking PPI. Still having some diarrhea but pepto bismol helped. No abdominal pain. No urinary symptoms, though he is urinating frequently.    Edema is improved. He has had dry, blistering skin on his hands and he also has circular patches of red, dry skin on his arms and legs. He has had this prior cycles as well. No bleeding issues.    Has slight neuropathy in his feet. Mood is better, he is on lexapro and meeting with Thuan Hirsch. He admits his mental health struggles when he is in the hospital for treatment.     Current Outpatient Medications   Medication Sig Dispense Refill     acetaminophen  "(TYLENOL) 500 MG tablet Take 500-1,000 mg by mouth every 6 hours as needed for mild pain       calcium carbonate (TUMS) 500 MG chewable tablet Take 1 tablet (500 mg) by mouth 3 times daily as needed for heartburn       celecoxib (CELEBREX) 200 MG capsule Take 1 capsule (200 mg) by mouth 2 times daily as needed for moderate pain . Note this is a new a strength! Only one capsule at a time! 60 capsule 3     cholecalciferol (VITAMIN D-1000 MAX ST) 1000 units TABS Take 1,000 Units by mouth daily        doxepin (SINEQUAN) 10 MG/ML (HIGH CONC) solution Take 2 mLs (20 mg) by mouth every 4 hours as needed for other (mouth pain) . Mix with equal parts tap water. Swish and hold in mouth ~60 seconds then spit out. 118 mL 3     escitalopram (LEXAPRO) 10 MG tablet Take 1 tablet (10 mg) by mouth daily 30 tablet 1     fluconazole (DIFLUCAN) 200 MG tablet Take 1 tablet (200 mg) by mouth daily 7 tablet 0     guaiFENesin-codeine (GUAIFENESIN AC) 100-10 MG/5ML syrup Take 10 mLs by mouth every 4 hours as needed for cough 118 mL 0     hydrocortisone (CORTEF) 10 MG tablet Take 20 mg in the morning and 10 mg in the afternoon 270 tablet 3     Insulin Syringe-Needle U-100 27.5G X 5/8\" 2 ML MISC 1 each daily as needed (with solucortef for adrenal crisis) 10 each 1     levothyroxine (SYNTHROID/LEVOTHROID) 75 MCG tablet Take 1 tablet (75 mcg) by mouth every morning 90 tablet 3     LORazepam (ATIVAN) 0.5 MG tablet Take 1 tablet (0.5 mg) by mouth every 4 hours as needed for nausea or vomiting . Caution: causes sedation. 30 tablet 0     Menthol-Methyl Salicylate (DALIA MALIK GREASELESS) cream Apply topically every 6 hours as needed       metroNIDAZOLE (METROGEL) 1 % external gel Apply topically daily .Try stronger dose due to increased symptoms after chemo. 30 g 0     nystatin (MYCOSTATIN) 965588 UNIT/ML suspension Take 5 mLs (500,000 Units) by mouth 4 times daily Hold while taking Fluconazole, could resume after Fluconazole if you have ongoing " coating on tongue. 473 mL 0     OLANZapine (ZYPREXA) 5 MG tablet Take 1 tablet (5 mg) by mouth At Bedtime Continue until your nausea resolves 30 tablet 1     omeprazole (PRILOSEC) 20 MG DR capsule Take 2 capsules (40 mg) by mouth daily 60 capsule 1     ondansetron (ZOFRAN) 4 MG tablet Take 1-2 tablets (4-8 mg) by mouth every 8 hours as needed for nausea 60 tablet 3     phosphorus tablet 250 mg (PHOSPHA 250 NEUTRAL) 250 MG per tablet Take 2 tablets (500 mg) by mouth daily 60 tablet 1     potassium chloride ER (K-TAB) 20 MEQ CR tablet Take 1 tablet (20 mEq) by mouth daily 60 tablet 1     prochlorperazine (COMPAZINE) 5 MG tablet Take 1 tablet (5 mg) by mouth every 6 hours as needed for nausea or vomiting . Caution: causes sedation. 30 tablet 1     SUMAtriptan (IMITREX) 50 MG tablet Take 1 tablet (50 mg) by mouth at onset of headache for migraine May repeat in 2 hours. Max 4 tablets/24 hours. 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     Thyroid disease     removed pituitary gland     Past Surgical History:   Procedure Laterality Date     COLONOSCOPY N/A 12/17/2020    Procedure: COLONOSCOPY;  Surgeon: Ken Camacho MD;  Location: [facility]     ENT SURGERY       HERNIA REPAIR       INSERT PORT VASCULAR ACCESS Right 1/28/2022    Procedure: INSERTION, VASCULAR ACCESS PORT;  Surgeon: Daniel Kinney MD;  Location: [facility] [place] OR     IR CHEST PORT PLACEMENT > 5 YRS OF AGE  1/28/2022     LARYNGOSCOPY, EXCISE VOCAL CORD LESION MICROSCOPIC, COMBINED Left 07/01/2021    Procedure: MICROLARYNGOSCOPY, LEFT TRUE VOCAL CORD INJECTION WITH PROLARYN;  Surgeon: Kyree Bearden MD;  Location: [facility] OR     PHACOEMULSIFICATION WITH STANDARD INTRAOCULAR LENS IMPLANT Right 03/10/2021    Procedure: Cataract removal with implant.;  Surgeon:  Jamir Mac MD;  Location: [facility] OR     PHACOEMULSIFICATION WITH STANDARD INTRAOCULAR LENS IMPLANT Left 04/05/2021    Procedure: Cataract removal with implant.;  Surgeon: Jamir Mac MD;  Location: [facility] OR     PICC DOUBLE LUMEN PLACEMENT Right 12/01/2021    5FR DL PICC, basilic vein. L-38cm, 1cm out.     PICC DOUBLE LUMEN PLACEMENT Right 01/04/2022    Right cephalic, 41 cm, 1 external length     PITUITARY EXCISION       tooth pulled 4/7  Right      Allergies   Allergen Reactions     Penicillins Hives and Swelling            Social History   Social History     Tobacco Use     Smoking status: Never Smoker     Smokeless tobacco: Never Used   Substance Use Topics     Alcohol use: Yes     Comment: rare     Drug use: Never      Past medical history and social history were reviewed.    Physical Examination:  There were no vitals taken for this visit.  Wt Readings from Last 10 Encounters:   02/08/22 63 kg (138 lb 14.4 oz)   01/28/22 65.8 kg (145 lb)   01/11/22 65.1 kg (143 lb 8 oz)   12/09/21 66.7 kg (147 lb)   12/09/21 67 kg (147 lb 11.2 oz)   12/08/21 64.8 kg (142 lb 13.7 oz)   11/12/21 68 kg (150 lb)   11/11/21 69.9 kg (154 lb)   11/04/21 70 kg (154 lb 6.4 oz)   11/02/21 85.9 kg (189 lb 6.4 oz)     Video physical exam  General: Patient appears well in no acute distress.   Skin: No visualized rash or lesions on visualized skin  Eyes: EOMI, no erythema, sclera icterus or discharge noted  Resp: Appears to be breathing comfortably without accessory muscle usage, speaking in full sentences, no cough  MSK: Appears to have normal range of motion based on visualized movements  Neurologic: No apparent tremors, facial movements symmetric  Psych: affect normal, alert and oriented    The rest of a comprehensive physical examination is deferred due to PHE (public health emergency) video restrictions    Laboratory Data:  Results for MARISOL XIE (MRN [id number]) as of 2/15/2022 10:27   2/14/2022 09:01   Sodium  142   Potassium 3.0 (L)   Chloride 112 (H)   Carbon Dioxide 26   Urea Nitrogen 18   Creatinine 0.96   GFR Estimate 83   Calcium 9.1   Anion Gap 4   Magnesium 2.1   Phosphorus 2.1 (L)   Albumin 3.4   Protein Total 6.4 (L)   Bilirubin Total 0.4   Alkaline Phosphatase 179 (H)   ALT 50   AST 17   Glucose 94   WBC 1.8 (L)   Hemoglobin 7.8 (L)   Hematocrit 24.5 (L)   Platelet Count 53 (L)   RBC Count 2.56 (L)   MCV 96   MCH 30.5   MCHC 31.8   RDW 15.2 (H)   % Neutrophils 37   % Lymphocytes 58   % Monocytes 5   % Eosinophils 0   % Basophils 0   Absolute Basophils 0.0   Absolute Neutrophil 0.7 (L)   Absolute Lymphocytes 1.0   Absolute Monocytes 0.1   Absolute Eosinophils 0.0   RBC Morphology Confirmed RBC Indices   Platelet Morphology Automated Count Confirmed. Giant platelets are present. (A)   Reactive Lymphs Present (A)       Assessment and Plan:  1. 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 High PD-L1. Was on Keytruda but had progression, now on Doxil + Ifos started 10/26/21. Port placed 1/28/22.     Did not tolerate well-had significant mucositis, poor PO intake, nausea, and electrolyte derrangements. He has had less cancer pain and CT with stable disease. Cycle 2 given 12/1/21 with 10% dose reduction and did better. Cycle 3 given 1/4/22 at same dose as C2- did have nausea and hypokalemia/hypophos. Cycle 4 given 2/2/22 at same dose as C3-more issues with nausea and dehydration, also had significant neurotoxicity on day 5 and treatment stopped early.    He is doing better today though still struggling from cycle 4. See supportive cares below. Plan for CT in the upcoming week-clinically he continues to note improvement in cancer pain. Discussed with Dr. Wolff and will wait to determine Doxil alone vs Doxil + Ifos with further dose reduction for cycle 5.    MARLEEN visit next week. 2x weekly labs and infusions in [place] scheduled.      2. GI  Dyspepsia: Continue Omeprazole 40mg  daily. Continue Tums PRN     CINV: Continue Zofran daily and Zyprexa at bedtime.Continue Compazine and Lorazepam PRN. Continue Emend inpatient on day 1 and day 6. Did require Dex/Emend last week which helped.      LFT elevation: 2/2 statin, improving     Gas: Continue Gas-x PRN      Diarrhea: Inpatient issue, C. Diff was negative. Imodium and Pepto Bismol PRN.      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 Cal Saba endocrine.     Hypothyroidism continue Synthroid 75mcg daily.       4. Derm  Rosacea: Long standing issue, continue Metrogel PRN     Hand/foot: 2/2 Doxil, icing hands and feet. Continue emollients at home. Add Kenalog cream for bothersome/blistering and red areas. Pending improvement may need to consider another dose reduction for Doxil.      5. MSK  Left shoulder/back/chest wall pain 2/2 tumor, following with palliative. No pain currently, off all pain medications.       6. ENT  Hoarseness: Thought 2/2 mediastinal mass vs irritation from prior biopsy. Following with ENT, s/p vocal cord infection 7/1/21. Following with voice clinic. This is improved.     Mucositis/Thrush: Significant, 2/2 Doxil. Better with dose reduction. Continue salt/soda rinses. Extend fluconazole course by one week given ongoing symptoms and neutropenia. Nystatin PRN, will restart consistently once completed fluconazole. Avoid orajel.      7. Pulm/Cards  Dyspnea with talking, prior work-up with CT PE negative for PE, infection, pneumonitis; troponin negative; COVID negative. Sating well on RA. Agree with speech pathology thoughts on laryngeal dysfunction. Symptoms improved.       Continue Guaifenesin-Coedine PRN for cough, much improved.      Saw cardiology, echo WNL. Has mild CAD, recommended to start on statin however now HELD for LFT elevation, improving.      Had elevated BP inpatient. Monitor. Hesitant to start antihypertensives given prior issues with dehydration and lightheadedness.      Continue  compression stockings for edema, improving.    Will check EKG on Thursday this week given multiple QT prolonging medications    8. FEN  Hypokalemia: 2/2 Ifos and poor PO intake. Currently on 40meq daily. Received potassium in infusion today, slowly improving. Continue on current dose and twice weekly montioring.      Hypophosphatemia: 2/2 ifos and poor PO intake. Currently on 500mg daily. Phos stable and he is eating better so keep on current dose and twice weekly monitoring.      Poor PO intake: 2/2 mucositis and nausea as above. Was poor with thrush, now improved.      Dehydration: Twice weekly fluids at [place], edema improved.      9. Psych/Neuro  Anxiety/depression: Hx of issues, recurrent issue when he was feeling poorly from treatment. Started on Lexapro by inpatient team which has helped (checking EKG for QT monitoring as above).      Continue PRN Imitrex for migraines.     Had neuro toxicity inpatient with cycle 4 (monoclonic jerking, syncope, halluncinations), now resolved. Will determine dose adjustments vs holding after seeing how he recovers this cycle and how his CT looks in a few weeks    10. Heme  Pancytopenia 2/2 chemotherapy. Hgb at 7.8, symptomatic with WALKER. Will recheck Thursday and if <8 will give 1 unit pRBC. Recheck plt and ANC on Thursday, monitor for bleeding or fevers.    FINAL PLAN  -Continue current home meds as is. Add Kenalog cream for rash  -Recheck labs (CBC, CMP, Mag, Phos) Thursday 2/17  -Give 1 unit pRBC if hgb <8. If hgb improved then can get IVF  -EKG Thursday 2/17 in [place]   -Follow-up with MARLEEN in one week virtually     45 minutes spent on the date of the encounter doing chart review, review of test results, interpretation of tests, patient visit, documentation and discussion with other provider(s)     Maynor Rios PA-C  Department of Hematology and Oncology  [facility]         Again, thank you for allowing me to participate in the care of your  patient.        Sincerely,        GERALDO Mullins

## 2022-02-14 NOTE — LETTER
2/14/2022         RE: Ifrah Huitron  [id number] [facility] [address]        Dear Colleague,    Thank you for referring your patient, Ifrah Huitron, to the [facility]. Please see a copy of my visit note below.    Ifrah is a 71 year old who is being evaluated via a billable video visit.      How would you like to obtain your AVS? MyChart  If the video visit is dropped, the invitation should be resent by: Text to cell phone: [phone]  Will anyone else be joining your video visit? No Wife is with him.    Amanda Morgan VF        Video Start Time: 1:30pm    Video-Visit Details    Type of service:  Video Visit    Video End Time: 2:00pm    Originating Location (pt. Location): Home    Distant Location (provider location):  [facility]     [facility]    Oncology/Hematology Visit Note  Feb 14, 2022    Reason for Visit: Follow up of spindle cell sarcoma     History of Present Illness: Ifrah Huitron is a 71 year old male with PMH rosacea, pituitary macroadenoma s/p resection, GERD, kidney stones, DJD with metastatic spindle cell sarcoma. He presented to his PCP March 2021 with chest pain/left shoulder and back pain that led to imaging which revealed multiple lung mets. There were difficulties in getting diagnostic biopsy, eventually biopsy of mediastinal mass with spindle cell sarcoma. There was high PD-L1 expression (90%). Baseline imaging 6/21/21 with progression of lung mets and left-sided subdiaphragmatic mass, stable liver lesions. 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      He started Keytruda 6/21/21. CT 8/2/21 with positive response to treatment. CT 10/18/21 with mixed response- LUQ mass larger, anterior mediastinal and left anterior pleural mass smaller. Keytruda stopped.     Started on Doxil + Ifosfamide 10/26/21. Poorly tolerated with  mucositis, nausea, poor oral intake. CT with stable to positive response.      Received C2 12/1/21 (delayed for tolerance issues) with 10% dose reduction.     Received C3 1/4/22 with same 10% dose reduction. He had issues with nausea inpatient along with recurrent thrush.     Received C4 2/2/22 with same 10% dose reduction. Had more significant neurotoxicity so only received 5.5 days. Symptoms resolved after stopping Ifosfamide.     Interval History:  Ifrah was called today for follow-up. He felt horrible the end of this last cycle. He notes that the neuro toxicity started a day before they actually stopped his ifos -he was having hallucinations but didn't tell anyone. He slept the first two days he was home and felt very lightheaded. Now he is feeling better. He notes improvement with the IVF and emend/dex last week.    He denies fevers or chills. No headaches. No ongoing dizziness. No ongoing neuro concerns. His mouth is still sore and he notes mild thrush, finished his fluconazole today. He tried Orajel which burned. He is eating and drinking OK.     He denies chest pain. He has WALKER, especially stairs. His cough is unchanged. His cancer back/chest wall pain is completely resolved and he is off Celebrex and not needing anything for pain.    His nausea is controlled with Zyprexa at night and Zofran/Compazine during the day. Taking PPI. Still having some diarrhea but pepto bismol helped. No abdominal pain. No urinary symptoms, though he is urinating frequently.    Edema is improved. He has had dry, blistering skin on his hands and he also has circular patches of red, dry skin on his arms and legs. He has had this prior cycles as well. No bleeding issues.    Has slight neuropathy in his feet. Mood is better, he is on lexapro and meeting with Thuan Hirsch. He admits his mental health struggles when he is in the hospital for treatment.     Current Outpatient Medications   Medication Sig Dispense Refill     acetaminophen  "(TYLENOL) 500 MG tablet Take 500-1,000 mg by mouth every 6 hours as needed for mild pain       calcium carbonate (TUMS) 500 MG chewable tablet Take 1 tablet (500 mg) by mouth 3 times daily as needed for heartburn       celecoxib (CELEBREX) 200 MG capsule Take 1 capsule (200 mg) by mouth 2 times daily as needed for moderate pain . Note this is a new a strength! Only one capsule at a time! 60 capsule 3     cholecalciferol (VITAMIN D-1000 MAX ST) 1000 units TABS Take 1,000 Units by mouth daily        doxepin (SINEQUAN) 10 MG/ML (HIGH CONC) solution Take 2 mLs (20 mg) by mouth every 4 hours as needed for other (mouth pain) . Mix with equal parts tap water. Swish and hold in mouth ~60 seconds then spit out. 118 mL 3     escitalopram (LEXAPRO) 10 MG tablet Take 1 tablet (10 mg) by mouth daily 30 tablet 1     fluconazole (DIFLUCAN) 200 MG tablet Take 1 tablet (200 mg) by mouth daily 7 tablet 0     guaiFENesin-codeine (GUAIFENESIN AC) 100-10 MG/5ML syrup Take 10 mLs by mouth every 4 hours as needed for cough 118 mL 0     hydrocortisone (CORTEF) 10 MG tablet Take 20 mg in the morning and 10 mg in the afternoon 270 tablet 3     Insulin Syringe-Needle U-100 27.5G X 5/8\" 2 ML MISC 1 each daily as needed (with solucortef for adrenal crisis) 10 each 1     levothyroxine (SYNTHROID/LEVOTHROID) 75 MCG tablet Take 1 tablet (75 mcg) by mouth every morning 90 tablet 3     LORazepam (ATIVAN) 0.5 MG tablet Take 1 tablet (0.5 mg) by mouth every 4 hours as needed for nausea or vomiting . Caution: causes sedation. 30 tablet 0     Menthol-Methyl Salicylate (DALIA MALIK GREASELESS) cream Apply topically every 6 hours as needed       metroNIDAZOLE (METROGEL) 1 % external gel Apply topically daily .Try stronger dose due to increased symptoms after chemo. 30 g 0     nystatin (MYCOSTATIN) 365749 UNIT/ML suspension Take 5 mLs (500,000 Units) by mouth 4 times daily Hold while taking Fluconazole, could resume after Fluconazole if you have ongoing " coating on tongue. 473 mL 0     OLANZapine (ZYPREXA) 5 MG tablet Take 1 tablet (5 mg) by mouth At Bedtime Continue until your nausea resolves 30 tablet 1     omeprazole (PRILOSEC) 20 MG DR capsule Take 2 capsules (40 mg) by mouth daily 60 capsule 1     ondansetron (ZOFRAN) 4 MG tablet Take 1-2 tablets (4-8 mg) by mouth every 8 hours as needed for nausea 60 tablet 3     phosphorus tablet 250 mg (PHOSPHA 250 NEUTRAL) 250 MG per tablet Take 2 tablets (500 mg) by mouth daily 60 tablet 1     potassium chloride ER (K-TAB) 20 MEQ CR tablet Take 1 tablet (20 mEq) by mouth daily 60 tablet 1     prochlorperazine (COMPAZINE) 5 MG tablet Take 1 tablet (5 mg) by mouth every 6 hours as needed for nausea or vomiting . Caution: causes sedation. 30 tablet 1     SUMAtriptan (IMITREX) 50 MG tablet Take 1 tablet (50 mg) by mouth at onset of headache for migraine May repeat in 2 hours. Max 4 tablets/24 hours. 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     Thyroid disease     removed pituitary gland     Past Surgical History:   Procedure Laterality Date     COLONOSCOPY N/A 12/17/2020    Procedure: COLONOSCOPY;  Surgeon: Ken Camacho MD;  Location: [facility]     ENT SURGERY       HERNIA REPAIR       INSERT PORT VASCULAR ACCESS Right 1/28/2022    Procedure: INSERTION, VASCULAR ACCESS PORT;  Surgeon: Daniel Kinney MD;  Location: [facility] OR     IR CHEST PORT PLACEMENT > 5 YRS OF AGE  1/28/2022     LARYNGOSCOPY, EXCISE VOCAL CORD LESION MICROSCOPIC, COMBINED Left 07/01/2021    Procedure: MICROLARYNGOSCOPY, LEFT TRUE VOCAL CORD INJECTION WITH PROLARYN;  Surgeon: Kyree Bearden MD;  Location: [facility] OR     PHACOEMULSIFICATION WITH STANDARD INTRAOCULAR LENS IMPLANT Right 03/10/2021    Procedure: Cataract removal with implant.;  Surgeon:  Jamir Mac MD;  Location: [facility] OR     PHACOEMULSIFICATION WITH STANDARD INTRAOCULAR LENS IMPLANT Left 04/05/2021    Procedure: Cataract removal with implant.;  Surgeon: Jamir Mac MD;  Location: [facility]     PICC DOUBLE LUMEN PLACEMENT Right 12/01/2021    5FR DL PICC, basilic vein. L-38cm, 1cm out.     PICC DOUBLE LUMEN PLACEMENT Right 01/04/2022    Right cephalic, 41 cm, 1 external length     PITUITARY EXCISION       tooth pulled 4/7  Right      Allergies   Allergen Reactions     Penicillins Hives and Swelling            Social History   Social History     Tobacco Use     Smoking status: Never Smoker     Smokeless tobacco: Never Used   Substance Use Topics     Alcohol use: Yes     Comment: rare     Drug use: Never      Past medical history and social history were reviewed.    Physical Examination:  There were no vitals taken for this visit.  Wt Readings from Last 10 Encounters:   02/08/22 63 kg (138 lb 14.4 oz)   01/28/22 65.8 kg (145 lb)   01/11/22 65.1 kg (143 lb 8 oz)   12/09/21 66.7 kg (147 lb)   12/09/21 67 kg (147 lb 11.2 oz)   12/08/21 64.8 kg (142 lb 13.7 oz)   11/12/21 68 kg (150 lb)   11/11/21 69.9 kg (154 lb)   11/04/21 70 kg (154 lb 6.4 oz)   11/02/21 85.9 kg (189 lb 6.4 oz)     Video physical exam  General: Patient appears well in no acute distress.   Skin: No visualized rash or lesions on visualized skin  Eyes: EOMI, no erythema, sclera icterus or discharge noted  Resp: Appears to be breathing comfortably without accessory muscle usage, speaking in full sentences, no cough  MSK: Appears to have normal range of motion based on visualized movements  Neurologic: No apparent tremors, facial movements symmetric  Psych: affect normal, alert and oriented    The rest of a comprehensive physical examination is deferred due to PHE (public health emergency) video restrictions    Laboratory Data:  Results for MARISOL XIE (MRN [id number]) as of 2/15/2022 10:27   2/14/2022 09:01   Sodium  142   Potassium 3.0 (L)   Chloride 112 (H)   Carbon Dioxide 26   Urea Nitrogen 18   Creatinine 0.96   GFR Estimate 83   Calcium 9.1   Anion Gap 4   Magnesium 2.1   Phosphorus 2.1 (L)   Albumin 3.4   Protein Total 6.4 (L)   Bilirubin Total 0.4   Alkaline Phosphatase 179 (H)   ALT 50   AST 17   Glucose 94   WBC 1.8 (L)   Hemoglobin 7.8 (L)   Hematocrit 24.5 (L)   Platelet Count 53 (L)   RBC Count 2.56 (L)   MCV 96   MCH 30.5   MCHC 31.8   RDW 15.2 (H)   % Neutrophils 37   % Lymphocytes 58   % Monocytes 5   % Eosinophils 0   % Basophils 0   Absolute Basophils 0.0   Absolute Neutrophil 0.7 (L)   Absolute Lymphocytes 1.0   Absolute Monocytes 0.1   Absolute Eosinophils 0.0   RBC Morphology Confirmed RBC Indices   Platelet Morphology Automated Count Confirmed. Giant platelets are present. (A)   Reactive Lymphs Present (A)       Assessment and Plan:  1. 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 High PD-L1. Was on Keytruda but had progression, now on Doxil + Ifos started 10/26/21. Port placed 1/28/22.     Did not tolerate well-had significant mucositis, poor PO intake, nausea, and electrolyte derrangements. He has had less cancer pain and CT with stable disease. Cycle 2 given 12/1/21 with 10% dose reduction and did better. Cycle 3 given 1/4/22 at same dose as C2- did have nausea and hypokalemia/hypophos. Cycle 4 given 2/2/22 at same dose as C3-more issues with nausea and dehydration, also had significant neurotoxicity on day 5 and treatment stopped early.    He is doing better today though still struggling from cycle 4. See supportive cares below. Plan for CT in the upcoming week-clinically he continues to note improvement in cancer pain. Discussed with Dr. Wolff and will wait to determine Doxil alone vs Doxil + Ifos with further dose reduction for cycle 5.    MARLEEN visit next week. 2x weekly labs and infusions in [place] scheduled.      2. GI  Dyspepsia: Continue Omeprazole 40mg  daily. Continue Tums PRN     CINV: Continue Zofran daily and Zyprexa at bedtime.Continue Compazine and Lorazepam PRN. Continue Emend inpatient on day 1 and day 6. Did require Dex/Emend last week which helped.      LFT elevation: 2/2 statin, improving     Gas: Continue Gas-x PRN      Diarrhea: Inpatient issue, C. Diff was negative. Imodium and Pepto Bismol PRN.      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 Cal Saba endocrine.     Hypothyroidism continue Synthroid 75mcg daily.       4. Derm  Rosacea: Long standing issue, continue Metrogel PRN     Hand/foot: 2/2 Doxil, icing hands and feet. Continue emollients at home. Add Kenalog cream for bothersome/blistering and red areas. Pending improvement may need to consider another dose reduction for Doxil.      5. MSK  Left shoulder/back/chest wall pain 2/2 tumor, following with palliative. No pain currently, off all pain medications.       6. ENT  Hoarseness: Thought 2/2 mediastinal mass vs irritation from prior biopsy. Following with ENT, s/p vocal cord infection 7/1/21. Following with voice clinic. This is improved.     Mucositis/Thrush: Significant, 2/2 Doxil. Better with dose reduction. Continue salt/soda rinses. Extend fluconazole course by one week given ongoing symptoms and neutropenia. Nystatin PRN, will restart consistently once completed fluconazole. Avoid orajel.      7. Pulm/Cards  Dyspnea with talking, prior work-up with CT PE negative for PE, infection, pneumonitis; troponin negative; COVID negative. Sating well on RA. Agree with speech pathology thoughts on laryngeal dysfunction. Symptoms improved.       Continue Guaifenesin-Coedine PRN for cough, much improved.      Saw cardiology, echo WNL. Has mild CAD, recommended to start on statin however now HELD for LFT elevation, improving.      Had elevated BP inpatient. Monitor. Hesitant to start antihypertensives given prior issues with dehydration and lightheadedness.      Continue  compression stockings for edema, improving.    Will check EKG on Thursday this week given multiple QT prolonging medications    8. FEN  Hypokalemia: 2/2 Ifos and poor PO intake. Currently on 40meq daily. Received potassium in infusion today, slowly improving. Continue on current dose and twice weekly montioring.      Hypophosphatemia: 2/2 ifos and poor PO intake. Currently on 500mg daily. Phos stable and he is eating better so keep on current dose and twice weekly monitoring.      Poor PO intake: 2/2 mucositis and nausea as above. Was poor with thrush, now improved.      Dehydration: Twice weekly fluids at [place], edema improved.      9. Psych/Neuro  Anxiety/depression: Hx of issues, recurrent issue when he was feeling poorly from treatment. Started on Lexapro by inpatient team which has helped (checking EKG for QT monitoring as above).      Continue PRN Imitrex for migraines.     Had neuro toxicity inpatient with cycle 4 (monoclonic jerking, syncope, halluncinations), now resolved. Will determine dose adjustments vs holding after seeing how he recovers this cycle and how his CT looks in a few weeks    10. Heme  Pancytopenia 2/2 chemotherapy. Hgb at 7.8, symptomatic with WALKER. Will recheck Thursday and if <8 will give 1 unit pRBC. Recheck plt and ANC on Thursday, monitor for bleeding or fevers.    FINAL PLAN  -Continue current home meds as is. Add Kenalog cream for rash  -Recheck labs (CBC, CMP, Mag, Phos) Thursday 2/17  -Give 1 unit pRBC if hgb <8. If hgb improved then can get IVF  -EKG Thursday 2/17 in [place]   -Follow-up with MARLEEN in one week virtually     45 minutes spent on the date of the encounter doing chart review, review of test results, interpretation of tests, patient visit, documentation and discussion with other provider(s)     Maynor Rios PA-C  Department of Hematology and Oncology  [facility]         Again, thank you for allowing me to participate in the care of your  patient.        Sincerely,        GERALDO Mullins

## 2022-02-16 ENCOUNTER — VIRTUAL VISIT (OUTPATIENT)
Dept: ONCOLOGY | Facility: CLINIC | Age: 72
End: 2022-02-16
Attending: PHYSICIAN ASSISTANT
Payer: MEDICARE

## 2022-02-16 DIAGNOSIS — F41.1 GAD (GENERALIZED ANXIETY DISORDER): ICD-10-CM

## 2022-02-16 DIAGNOSIS — F43.21 ADJUSTMENT DISORDER WITH DEPRESSED MOOD: Primary | ICD-10-CM

## 2022-02-16 DIAGNOSIS — T45.1X5A CHEMOTHERAPY-INDUCED NEUTROPENIA (H): ICD-10-CM

## 2022-02-16 DIAGNOSIS — D70.1 CHEMOTHERAPY-INDUCED NEUTROPENIA (H): ICD-10-CM

## 2022-02-16 PROCEDURE — 90791 PSYCH DIAGNOSTIC EVALUATION: CPT | Mod: 95 | Performed by: PSYCHOLOGIST

## 2022-02-16 NOTE — PROGRESS NOTES
"Ifrah Huitron is a 71 year old male who is being evaluated via a billable telephone visit. Phone call duration: 30 minutes      The patient has been notified of following:      \"This telephone visit will be conducted via a call between you and your physician/provider. We have found that certain health care needs can be provided without the need for a physical exam.  This service lets us provide the care you need with a short phone conversation.  If a prescription is necessary we can send it directly to your pharmacy.  If lab work is needed we can place an order for that and you can then stop by our lab to have the test done at a later time.     Telephone visits are billed at different rates depending on your insurance coverage. During this emergency period, for some insurers they may be billed the same as an in-person visit.  Please reach out to your insurance provider with any questions.     If during the course of the call the physician/provider feels a telephone visit is not appropriate, you will not be charged for this service.\"     Patient has given verbal consent for Telephone visit? Yes    Confidential Summary of Oncology Psychology Initial Visit    Ifrah Huitron is a 71 year old male who was referred by Dr. Hairston for  Depression  The patient was seen for an initial 30 minute evaluation on 2/16/2022 using interactive psychotherapy.    Presenting Concerns: feeling down, depressed, low motivation, low initiative, fatigue, low energy, lays in bed when he gets tired, and uncertainty.     Mental Status/Interview:   Orientation: Ifrah Huitron was alert, attentive, and oriented to time, place, and person  Affect: Affect was appropriate to the situation and showed normal range and stability.  Speech: Speech was clear with normal fluency, rate, tone, and volume.  Memory: Although not formally assessed, immediate, recent, and remote memory appeared to be intact.   Insight and Judgement: Ifrah Huitron demonstrates adequate " "awareness of the issues discussed and was able to come to reasonable conclusions.  Thought: Thought patterns were coherent and logical. Hallucinations were denied and delusions were not evident.   Lethality: Ifrah Huitron denied suicidal or homicidal ideation or intention.        Impression: He acknowledged his change in behavior with getting fatigued quickly and needing to lie down multiple times per day. He felt it may be due to \"low blood count\" and he indicated he may be getting blood at the clinic. We discussed how this can impact mood with the symptoms he described but also emphasized the importance of moving his body each day and getting enough nutrition. He provided personal examples of moving each day and eating to demonstrate his understanding.     Diagnosis:   Encounter Diagnoses   Name Primary?     Chemotherapy-induced neutropenia (H)      TUYET (generalized anxiety disorder)      Adjustment disorder with depressed mood Yes     Recommendations/Plan:  1. Individual psychotherapy for 30 minute sessions every 2 weeks for 3 months with the goal of:   A. Assessing his mood during chemotherapy  2. Return for follow-up  Thank you for this opportunity to participate in your care of this patient.    Froy Hirsch Psy.D., L.P.  Director, Oncology Supportive Care   "

## 2022-02-17 ENCOUNTER — HOSPITAL ENCOUNTER (OUTPATIENT)
Dept: CARDIOLOGY | Facility: CLINIC | Age: 72
Discharge: HOME | End: 2022-02-17
Attending: PHYSICIAN ASSISTANT | Admitting: PHYSICIAN ASSISTANT
Payer: MEDICARE

## 2022-02-17 ENCOUNTER — LAB (OUTPATIENT)
Dept: INFUSION THERAPY | Facility: CLINIC | Age: 72
End: 2022-02-17
Attending: PHYSICIAN ASSISTANT
Payer: MEDICARE

## 2022-02-17 VITALS
HEART RATE: 69 BPM | OXYGEN SATURATION: 99 % | TEMPERATURE: 98.4 F | DIASTOLIC BLOOD PRESSURE: 85 MMHG | SYSTOLIC BLOOD PRESSURE: 136 MMHG | RESPIRATION RATE: 16 BRPM

## 2022-02-17 DIAGNOSIS — E87.6 HYPOKALEMIA: Primary | ICD-10-CM

## 2022-02-17 DIAGNOSIS — C49.9 SPINDLE CELL SARCOMA (H): ICD-10-CM

## 2022-02-17 DIAGNOSIS — T45.1X5A CHEMOTHERAPY-INDUCED NAUSEA: ICD-10-CM

## 2022-02-17 DIAGNOSIS — E87.6 HYPOKALEMIA: ICD-10-CM

## 2022-02-17 DIAGNOSIS — R11.0 CHEMOTHERAPY-INDUCED NAUSEA: ICD-10-CM

## 2022-02-17 DIAGNOSIS — D63.0 ANEMIA IN NEOPLASTIC DISEASE: ICD-10-CM

## 2022-02-17 DIAGNOSIS — C49.9 SARCOMA (H): ICD-10-CM

## 2022-02-17 LAB
ABO/RH(D): NORMAL
ALBUMIN SERPL-MCNC: 3.3 G/DL (ref 3.4–5)
ALP SERPL-CCNC: 181 U/L (ref 40–150)
ALT SERPL W P-5'-P-CCNC: 33 U/L (ref 0–70)
ANION GAP SERPL CALCULATED.3IONS-SCNC: 4 MMOL/L (ref 3–14)
ANTIBODY SCREEN: NEGATIVE
AST SERPL W P-5'-P-CCNC: 10 U/L (ref 0–45)
BASOPHILS # BLD MANUAL: 0 10E3/UL (ref 0–0.2)
BASOPHILS NFR BLD MANUAL: 0 %
BILIRUB SERPL-MCNC: 0.4 MG/DL (ref 0.2–1.3)
BLD PROD TYP BPU: NORMAL
BLOOD COMPONENT TYPE: NORMAL
BUN SERPL-MCNC: 20 MG/DL (ref 7–30)
CALCIUM SERPL-MCNC: 9 MG/DL (ref 8.5–10.1)
CHLORIDE BLD-SCNC: 108 MMOL/L (ref 94–109)
CO2 SERPL-SCNC: 30 MMOL/L (ref 20–32)
CODING SYSTEM: NORMAL
CREAT SERPL-MCNC: 1.04 MG/DL (ref 0.66–1.25)
CROSSMATCH: NORMAL
EOSINOPHIL # BLD MANUAL: 0 10E3/UL (ref 0–0.7)
EOSINOPHIL NFR BLD MANUAL: 0 %
ERYTHROCYTE [DISTWIDTH] IN BLOOD BY AUTOMATED COUNT: 14.8 % (ref 10–15)
GFR SERPL CREATININE-BSD FRML MDRD: 76 ML/MIN/1.73M2
GLUCOSE BLD-MCNC: 106 MG/DL (ref 70–99)
HCT VFR BLD AUTO: 23 % (ref 40–53)
HGB BLD-MCNC: 7.4 G/DL (ref 13.3–17.7)
ISSUE DATE AND TIME: NORMAL
LYMPHOCYTES # BLD MANUAL: 1.3 10E3/UL (ref 0.8–5.3)
LYMPHOCYTES NFR BLD MANUAL: 36 %
MAGNESIUM SERPL-MCNC: 1.9 MG/DL (ref 1.8–2.6)
MCH RBC QN AUTO: 30.7 PG (ref 26.5–33)
MCHC RBC AUTO-ENTMCNC: 32.2 G/DL (ref 31.5–36.5)
MCV RBC AUTO: 95 FL (ref 78–100)
MONOCYTES # BLD MANUAL: 0.3 10E3/UL (ref 0–1.3)
MONOCYTES NFR BLD MANUAL: 7 %
NEUTROPHILS # BLD MANUAL: 2.1 10E3/UL (ref 1.6–8.3)
NEUTROPHILS NFR BLD MANUAL: 57 %
PHOSPHATE SERPL-MCNC: 2.5 MG/DL (ref 2.5–4.5)
PLAT MORPH BLD: NORMAL
PLATELET # BLD AUTO: 33 10E3/UL (ref 150–450)
POTASSIUM BLD-SCNC: 3 MMOL/L (ref 3.4–5.3)
PROT SERPL-MCNC: 6.2 G/DL (ref 6.8–8.8)
RBC # BLD AUTO: 2.41 10E6/UL (ref 4.4–5.9)
RBC MORPH BLD: NORMAL
SODIUM SERPL-SCNC: 142 MMOL/L (ref 133–144)
SPECIMEN EXPIRATION DATE: NORMAL
UNIT ABO/RH: NORMAL
UNIT NUMBER: NORMAL
UNIT STATUS: NORMAL
UNIT TYPE ISBT: 5100
WBC # BLD AUTO: 3.7 10E3/UL (ref 4–11)

## 2022-02-17 PROCEDURE — 250N000011 HC RX IP 250 OP 636: Performed by: PHYSICIAN ASSISTANT

## 2022-02-17 PROCEDURE — 86901 BLOOD TYPING SEROLOGIC RH(D): CPT | Performed by: PHYSICIAN ASSISTANT

## 2022-02-17 PROCEDURE — 86923 COMPATIBILITY TEST ELECTRIC: CPT | Performed by: PHYSICIAN ASSISTANT

## 2022-02-17 PROCEDURE — 93005 ELECTROCARDIOGRAM TRACING: CPT

## 2022-02-17 PROCEDURE — 36591 DRAW BLOOD OFF VENOUS DEVICE: CPT

## 2022-02-17 PROCEDURE — 36430 TRANSFUSION BLD/BLD COMPNT: CPT

## 2022-02-17 PROCEDURE — 83735 ASSAY OF MAGNESIUM: CPT | Performed by: PHYSICIAN ASSISTANT

## 2022-02-17 PROCEDURE — 82435 ASSAY OF BLOOD CHLORIDE: CPT | Performed by: PHYSICIAN ASSISTANT

## 2022-02-17 PROCEDURE — 250N000013 HC RX MED GY IP 250 OP 250 PS 637: Performed by: PHYSICIAN ASSISTANT

## 2022-02-17 PROCEDURE — 85027 COMPLETE CBC AUTOMATED: CPT | Performed by: PHYSICIAN ASSISTANT

## 2022-02-17 PROCEDURE — 84100 ASSAY OF PHOSPHORUS: CPT | Performed by: PHYSICIAN ASSISTANT

## 2022-02-17 PROCEDURE — 93010 ELECTROCARDIOGRAM REPORT: CPT | Performed by: PHYSICIAN ASSISTANT

## 2022-02-17 PROCEDURE — P9016 RBC LEUKOCYTES REDUCED: HCPCS | Performed by: PHYSICIAN ASSISTANT

## 2022-02-17 RX ORDER — HEPARIN SODIUM (PORCINE) LOCK FLUSH IV SOLN 100 UNIT/ML 100 UNIT/ML
5 SOLUTION INTRAVENOUS
Status: DISCONTINUED | OUTPATIENT
Start: 2022-02-17 | End: 2022-02-17 | Stop reason: HOSPADM

## 2022-02-17 RX ORDER — HEPARIN SODIUM,PORCINE 10 UNIT/ML
5 VIAL (ML) INTRAVENOUS
Status: CANCELLED | OUTPATIENT
Start: 2022-02-17

## 2022-02-17 RX ORDER — POTASSIUM CHLORIDE 1500 MG/1
40 TABLET, EXTENDED RELEASE ORAL ONCE
Status: COMPLETED | OUTPATIENT
Start: 2022-02-17 | End: 2022-02-17

## 2022-02-17 RX ORDER — HEPARIN SODIUM (PORCINE) LOCK FLUSH IV SOLN 100 UNIT/ML 100 UNIT/ML
5 SOLUTION INTRAVENOUS
Status: CANCELLED
Start: 2022-02-17

## 2022-02-17 RX ORDER — POTASSIUM CHLORIDE 1500 MG/1
20 TABLET, EXTENDED RELEASE ORAL 3 TIMES DAILY
Qty: 60 TABLET | Refills: 1 | COMMUNITY
Start: 2022-02-17 | End: 2022-02-28

## 2022-02-17 RX ORDER — HEPARIN SODIUM (PORCINE) LOCK FLUSH IV SOLN 100 UNIT/ML 100 UNIT/ML
5 SOLUTION INTRAVENOUS
Status: CANCELLED | OUTPATIENT
Start: 2022-02-17

## 2022-02-17 RX ADMIN — Medication 5 ML: at 13:28

## 2022-02-17 RX ADMIN — POTASSIUM CHLORIDE 40 MEQ: 20 TABLET, EXTENDED RELEASE ORAL at 11:52

## 2022-02-17 NOTE — PROGRESS NOTES
Infusion Nursing Note:  Ifrah Huitron presents today for 1 unit PRBC.    Patient seen by provider today: No   present during visit today: Not Applicable.    Note: Patient not receiving fluids today due to receiving blood, per Maynor CHOW, he should only receive fluids if his hemoglobin has improved, it has not improved, will only give blood products today.       Intravenous Access:  Implanted Port.    Treatment Conditions:  Lab Results   Component Value Date    HGB 7.4 (L) 02/17/2022    WBC 3.7 (L) 02/17/2022    ANEU 2.1 02/17/2022    ANEUTAUTO 3.4 02/07/2022    PLT 33 (LL) 02/17/2022      Lab Results   Component Value Date     02/17/2022    POTASSIUM 3.0 (L) 02/17/2022    MAG 2.1 02/14/2022    CR 1.04 02/17/2022    COTY 9.0 02/17/2022    BILITOTAL 0.4 02/17/2022    ALBUMIN 3.3 (L) 02/17/2022    ALT 33 02/17/2022    AST 10 02/17/2022     Results reviewed, labs MET treatment parameters, ok to proceed with treatment.  Blood transfusion consent signed 2/17/22.  Potassium 3.0 today, per patient he would prefer oral tablet of potassium for replacement instead of IV replacement.      Post Infusion Assessment:  Patient tolerated infusion without incident.  Blood return noted pre and post infusion.  Site patent and intact, free from redness, edema or discomfort.  No evidence of extravasations.  Access discontinued per protocol.       Discharge Plan:   Copy of AVS reviewed with patient and/or family.  Patient will return 2/21/22 for next appointment.  Patient discharged in stable condition accompanied by: self.  Departure Mode: Ambulatory.      Suzette Riley RN

## 2022-02-17 NOTE — PROGRESS NOTES
PAC labs drawn without difficulty via site protocol. Patient tolerated well.    Suzette Riley RN on 2/17/2022 at 9:07 AM

## 2022-02-20 DIAGNOSIS — E83.39 HYPOPHOSPHATEMIA: ICD-10-CM

## 2022-02-20 NOTE — CONFIDENTIAL NOTE
Phosphorus Tablet Refill   Last prescribing provider: Maynor CHOW    Last clinic visit date: 2/14/22 Maynor CHOW    Any missed appointments or no-shows since last clinic visit?: No     Recommendations for requested medication (if none, N/A): Copied from chart note 2/10/22 Maynor CHOW  Hypophosphatemia: 2/2 ifos and poor PO intake. Currently on 500mg daily. Phos stable and he is eating better so keep on current dose and twice weekly monitoring.       Next clinic visit date: 2/21/22 Maynor CHOW    Any other pertinent information (if none, N/A): N/A

## 2022-02-21 ENCOUNTER — LAB (OUTPATIENT)
Dept: INFUSION THERAPY | Facility: CLINIC | Age: 72
End: 2022-02-21
Attending: PHYSICIAN ASSISTANT
Payer: MEDICARE

## 2022-02-21 ENCOUNTER — VIRTUAL VISIT (OUTPATIENT)
Dept: ONCOLOGY | Facility: CLINIC | Age: 72
End: 2022-02-21
Attending: PHYSICIAN ASSISTANT
Payer: MEDICARE

## 2022-02-21 VITALS
SYSTOLIC BLOOD PRESSURE: 125 MMHG | OXYGEN SATURATION: 98 % | DIASTOLIC BLOOD PRESSURE: 78 MMHG | TEMPERATURE: 98.5 F | RESPIRATION RATE: 18 BRPM | HEART RATE: 70 BPM

## 2022-02-21 DIAGNOSIS — C49.9 SPINDLE CELL SARCOMA (H): ICD-10-CM

## 2022-02-21 DIAGNOSIS — T45.1X5A CHEMOTHERAPY-INDUCED NAUSEA: ICD-10-CM

## 2022-02-21 DIAGNOSIS — R11.0 CHEMOTHERAPY-INDUCED NAUSEA: ICD-10-CM

## 2022-02-21 DIAGNOSIS — C49.9 SARCOMA (H): Primary | ICD-10-CM

## 2022-02-21 DIAGNOSIS — C49.9 SPINDLE CELL SARCOMA (H): Primary | ICD-10-CM

## 2022-02-21 LAB
ALBUMIN SERPL-MCNC: 3.2 G/DL (ref 3.4–5)
ALP SERPL-CCNC: 174 U/L (ref 40–150)
ALT SERPL W P-5'-P-CCNC: 24 U/L (ref 0–70)
ANION GAP SERPL CALCULATED.3IONS-SCNC: 4 MMOL/L (ref 3–14)
AST SERPL W P-5'-P-CCNC: 16 U/L (ref 0–45)
BASOPHILS # BLD MANUAL: 0.1 10E3/UL (ref 0–0.2)
BASOPHILS NFR BLD MANUAL: 1 %
BILIRUB SERPL-MCNC: 0.1 MG/DL (ref 0.2–1.3)
BUN SERPL-MCNC: 18 MG/DL (ref 7–30)
CALCIUM SERPL-MCNC: 8.9 MG/DL (ref 8.5–10.1)
CHLORIDE BLD-SCNC: 105 MMOL/L (ref 94–109)
CO2 SERPL-SCNC: 29 MMOL/L (ref 20–32)
CREAT SERPL-MCNC: 1.03 MG/DL (ref 0.66–1.25)
DACRYOCYTES BLD QL SMEAR: SLIGHT
EOSINOPHIL # BLD MANUAL: 0 10E3/UL (ref 0–0.7)
EOSINOPHIL NFR BLD MANUAL: 0 %
ERYTHROCYTE [DISTWIDTH] IN BLOOD BY AUTOMATED COUNT: 15.9 % (ref 10–15)
GFR SERPL CREATININE-BSD FRML MDRD: 77 ML/MIN/1.73M2
GLUCOSE BLD-MCNC: 136 MG/DL (ref 70–99)
HCT VFR BLD AUTO: 27.9 % (ref 40–53)
HGB BLD-MCNC: 8.7 G/DL (ref 13.3–17.7)
LYMPHOCYTES # BLD MANUAL: 1.3 10E3/UL (ref 0.8–5.3)
LYMPHOCYTES NFR BLD MANUAL: 21 %
MAGNESIUM SERPL-MCNC: 1.6 MG/DL (ref 1.8–2.6)
MCH RBC QN AUTO: 30.4 PG (ref 26.5–33)
MCHC RBC AUTO-ENTMCNC: 31.2 G/DL (ref 31.5–36.5)
MCV RBC AUTO: 98 FL (ref 78–100)
METAMYELOCYTES # BLD MANUAL: 0.1 10E3/UL
METAMYELOCYTES NFR BLD MANUAL: 2 %
MONOCYTES # BLD MANUAL: 0.2 10E3/UL (ref 0–1.3)
MONOCYTES NFR BLD MANUAL: 3 %
MYELOCYTES # BLD MANUAL: 0.1 10E3/UL
MYELOCYTES NFR BLD MANUAL: 1 %
NEUTROPHILS # BLD MANUAL: 4.5 10E3/UL (ref 1.6–8.3)
NEUTROPHILS NFR BLD MANUAL: 72 %
PHOSPHATE SERPL-MCNC: 2.7 MG/DL (ref 2.5–4.5)
PLAT MORPH BLD: ABNORMAL
PLATELET # BLD AUTO: 67 10E3/UL (ref 150–450)
POLYCHROMASIA BLD QL SMEAR: SLIGHT
POTASSIUM BLD-SCNC: 4.4 MMOL/L (ref 3.4–5.3)
PROT SERPL-MCNC: 6.4 G/DL (ref 6.8–8.8)
RBC # BLD AUTO: 2.86 10E6/UL (ref 4.4–5.9)
RBC MORPH BLD: ABNORMAL
SODIUM SERPL-SCNC: 138 MMOL/L (ref 133–144)
TOXIC GRANULES BLD QL SMEAR: PRESENT
WBC # BLD AUTO: 6.2 10E3/UL (ref 4–11)

## 2022-02-21 PROCEDURE — 83735 ASSAY OF MAGNESIUM: CPT | Performed by: PHYSICIAN ASSISTANT

## 2022-02-21 PROCEDURE — 85027 COMPLETE CBC AUTOMATED: CPT | Performed by: PHYSICIAN ASSISTANT

## 2022-02-21 PROCEDURE — G0463 HOSPITAL OUTPT CLINIC VISIT: HCPCS | Mod: PN,RTG,25 | Performed by: PHYSICIAN ASSISTANT

## 2022-02-21 PROCEDURE — 250N000011 HC RX IP 250 OP 636: Performed by: PHYSICIAN ASSISTANT

## 2022-02-21 PROCEDURE — 80053 COMPREHEN METABOLIC PANEL: CPT | Performed by: PHYSICIAN ASSISTANT

## 2022-02-21 PROCEDURE — 258N000003 HC RX IP 258 OP 636: Performed by: PHYSICIAN ASSISTANT

## 2022-02-21 PROCEDURE — 84100 ASSAY OF PHOSPHORUS: CPT | Performed by: PHYSICIAN ASSISTANT

## 2022-02-21 PROCEDURE — 36591 DRAW BLOOD OFF VENOUS DEVICE: CPT

## 2022-02-21 PROCEDURE — 99214 OFFICE O/P EST MOD 30 MIN: CPT | Mod: 95 | Performed by: PHYSICIAN ASSISTANT

## 2022-02-21 PROCEDURE — 96360 HYDRATION IV INFUSION INIT: CPT

## 2022-02-21 RX ORDER — HEPARIN SODIUM (PORCINE) LOCK FLUSH IV SOLN 100 UNIT/ML 100 UNIT/ML
5 SOLUTION INTRAVENOUS
Status: CANCELLED
Start: 2022-02-21

## 2022-02-21 RX ORDER — HEPARIN SODIUM (PORCINE) LOCK FLUSH IV SOLN 100 UNIT/ML 100 UNIT/ML
5 SOLUTION INTRAVENOUS
Status: COMPLETED | OUTPATIENT
Start: 2022-02-21 | End: 2022-02-21

## 2022-02-21 RX ADMIN — Medication 5 ML: at 12:24

## 2022-02-21 RX ADMIN — SODIUM CHLORIDE 1000 ML: 9 INJECTION, SOLUTION INTRAVENOUS at 11:20

## 2022-02-21 NOTE — PROGRESS NOTES
Infusion Nursing Note:  Ifrah Huitron presents today for IVF    Patient seen by provider today: No   present during visit today: Not Applicable.    Note: Patient states he continues to have mouth sores and has recently noticed new ones. He stated he took his last Fluconazole today and will ask GERALDO Monreal for a refill later today. He is also doing salt water rinses.    Intravenous Access:  Implanted Port.    Treatment Conditions:  Lab Results   Component Value Date     02/21/2022    POTASSIUM 4.4 02/21/2022    MAG 1.9 02/17/2022    CR 1.03 02/21/2022    COTY 8.9 02/21/2022    BILITOTAL 0.1 (L) 02/21/2022    ALBUMIN 3.2 (L) 02/21/2022    ALT 24 02/21/2022    AST 16 02/21/2022   Results reviewed, labs MET treatment parameters, ok to proceed with treatment.    Post Infusion Assessment:  Patient tolerated infusion without incident.  Blood return noted pre and post infusion.  Site patent and intact, free from redness, edema or discomfort.  No evidence of extravasations.  Access discontinued per protocol.     Discharge Plan:   Discharge instructions reviewed with: Patient.  Patient and/or family verbalized understanding of discharge instructions and all questions answered.  AVS to patient via [employer].  Patient will return 2/24/2022 for next appointment.   Patient discharged in stable condition accompanied by: self.  Departure Mode: Ambulatory.    Erin Lowe RN

## 2022-02-21 NOTE — Clinical Note
"    2/21/2022         RE: Ifrah Huitron  [address] [place] [address]        Dear Colleague,    Thank you for referring your patient, Ifrah Huitron, to the [facility]. Please see a copy of my visit note below.    Ifrah is a 71 year old who is being evaluated via a billable video visit.  Currently in State of [place]>      How would you like to obtain your AVS? MyChart  If the video visit is dropped, the invitation should be resent by: Text to cell phone: [phone]  Will anyone else be joining your video visit? Yes: . How would they like to receive their invitation? Other e-mail: n/a  {If patient encounters technical issues they should call [phone] :[phone]}    Video Start Time: {video visit start/end time for provider to select:720675}  Video-Visit Details    Type of service:  Video Visit    Video End Time:{video visit start/end time for provider to select:709903}    Originating Location (pt. Location): {video visit patient location:998095::\"Home\"}    Distant Location (provider location):  [facility]     Platform used for Video Visit: {Virtual Visit Platforms:909719::\"Secoo\"}   Rolanda Choe      Oncology/Hematology Visit Note  Feb 21, 2022    Reason for Visit: Follow up of spindle cell sarcoma     History of Present Illness: Ifrah Huitron is a 71 year old male with PMH rosacea, pituitary macroadenoma s/p resection, GERD, kidney stones, DJD with metastatic spindle cell sarcoma. He presented to his PCP March 2021 with chest pain/left shoulder and back pain that led to imaging which revealed multiple lung mets. There were difficulties in getting diagnostic biopsy, eventually biopsy of mediastinal mass with spindle cell sarcoma. There was high PD-L1 expression (90%). Baseline imaging 6/21/21 with progression of lung mets and left-sided subdiaphragmatic mass, stable liver lesions. He saw ENT for hoarseness thought 2/2 left true vocal fold " motion impairment from mediastinal mass-underwent injection 7/1/21.      He started Keytruda 6/21/21. CT 8/2/21 with positive response to treatment. CT 10/18/21 with mixed response- LUQ mass larger, anterior mediastinal and left anterior pleural mass smaller. Keytruda stopped.     Started on Doxil + Ifosfamide 10/26/21. Poorly tolerated with mucositis, nausea, poor oral intake. CT with stable to positive response.      Received C2 12/1/21 (delayed for tolerance issues) with 10% dose reduction.     Received C3 1/4/22 with same 10% dose reduction. He had issues with nausea inpatient along with recurrent thrush.      Received C4 2/2/22 with same 10% dose reduction. Had more significant neurotoxicity so only received 5.5 days. Symptoms resolved after stopping Ifosfamide.     Interval History:      Review of Systems:  Patient denies fevers, chills, night sweats, unexplained weight changes, headaches, dizziness, vision or hearing changes, new lumps or bumps, chest pain, shortness of breath, cough, abdominal pain, nausea, vomiting, changes to bowel or bladder, swelling of extremities, bleeding issues, or rash.    Current Outpatient Medications   Medication Sig Dispense Refill     acetaminophen (TYLENOL) 500 MG tablet Take 500-1,000 mg by mouth every 6 hours as needed for mild pain       calcium carbonate (TUMS) 500 MG chewable tablet Take 1 tablet (500 mg) by mouth 3 times daily as needed for heartburn       cholecalciferol (VITAMIN D-1000 MAX ST) 1000 units TABS Take 1,000 Units by mouth daily        doxepin (SINEQUAN) 10 MG/ML (HIGH CONC) solution Take 2 mLs (20 mg) by mouth every 4 hours as needed for other (mouth pain) . Mix with equal parts tap water. Swish and hold in mouth ~60 seconds then spit out. 118 mL 3     escitalopram (LEXAPRO) 10 MG tablet Take 1 tablet (10 mg) by mouth daily 30 tablet 1     fluconazole (DIFLUCAN) 200 MG tablet Take 1 tablet (200 mg) by mouth daily 7 tablet 0     guaiFENesin-codeine  "(GUAIFENESIN AC) 100-10 MG/5ML syrup Take 10 mLs by mouth every 4 hours as needed for cough 118 mL 0     hydrocortisone (CORTEF) 10 MG tablet Take 20 mg in the morning and 10 mg in the afternoon 270 tablet 3     Insulin Syringe-Needle U-100 27.5G X 5/8\" 2 ML MISC 1 each daily as needed (with solucortef for adrenal crisis) 10 each 1     levothyroxine (SYNTHROID/LEVOTHROID) 75 MCG tablet Take 1 tablet (75 mcg) by mouth every morning 90 tablet 3     LORazepam (ATIVAN) 0.5 MG tablet Take 1 tablet (0.5 mg) by mouth every 4 hours as needed for nausea or vomiting . Caution: causes sedation. 30 tablet 0     Menthol-Methyl Salicylate (DALIA MALIK GREASELESS) cream Apply topically every 6 hours as needed       metroNIDAZOLE (METROGEL) 1 % external gel Apply topically daily .Try stronger dose due to increased symptoms after chemo. 30 g 0     nystatin (MYCOSTATIN) 724141 UNIT/ML suspension Take 5 mLs (500,000 Units) by mouth 4 times daily Hold while taking Fluconazole, could resume after Fluconazole if you have ongoing coating on tongue. 473 mL 0     OLANZapine (ZYPREXA) 5 MG tablet Take 1 tablet (5 mg) by mouth At Bedtime Continue until your nausea resolves 30 tablet 1     omeprazole (PRILOSEC) 20 MG DR capsule Take 2 capsules (40 mg) by mouth daily 60 capsule 1     ondansetron (ZOFRAN) 4 MG tablet Take 1-2 tablets (4-8 mg) by mouth every 8 hours as needed for nausea 60 tablet 3     phosphorus tablet 250 mg (PHOSPHA 250 NEUTRAL) 250 MG per tablet Take 2 tablets (500 mg) by mouth daily 60 tablet 1     potassium chloride ER (K-TAB) 20 MEQ CR tablet Take 1 tablet (20 mEq) by mouth 3 times daily 60 tablet 1     prochlorperazine (COMPAZINE) 5 MG tablet Take 1 tablet (5 mg) by mouth every 6 hours as needed for nausea or vomiting . Caution: causes sedation. 30 tablet 1     SUMAtriptan (IMITREX) 50 MG tablet Take 1 tablet (50 mg) by mouth at onset of headache for migraine May repeat in 2 hours. Max 4 tablets/24 hours. 10 tablet 3     " triamcinolone (KENALOG) 0.1 % external cream Apply topically 2 times daily to bothersome skin areas. 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     Thyroid disease     removed pituitary gland     Past Surgical History:   Procedure Laterality Date     COLONOSCOPY N/A 12/17/2020    Procedure: COLONOSCOPY;  Surgeon: Ken Camacho MD;  Location: [facility]     ENT SURGERY       HERNIA REPAIR       INSERT PORT VASCULAR ACCESS Right 1/28/2022    Procedure: INSERTION, VASCULAR ACCESS PORT;  Surgeon: Daniel Kinney MD;  Location: [facility] OR     IR CHEST PORT PLACEMENT > 5 YRS OF AGE  1/28/2022     LARYNGOSCOPY, EXCISE VOCAL CORD LESION MICROSCOPIC, COMBINED Left 07/01/2021    Procedure: MICROLARYNGOSCOPY, LEFT TRUE VOCAL CORD INJECTION WITH PROLARYN;  Surgeon: Kyree Bearden MD;  Location: [facility]     PHACOEMULSIFICATION WITH STANDARD INTRAOCULAR LENS IMPLANT Right 03/10/2021    Procedure: Cataract removal with implant.;  Surgeon: Jamir Mac MD;  Location: [facility]     PHACOEMULSIFICATION WITH STANDARD INTRAOCULAR LENS IMPLANT Left 04/05/2021    Procedure: Cataract removal with implant.;  Surgeon: Jamir Mac MD;  Location: [facility]     PICC DOUBLE LUMEN PLACEMENT Right 12/01/2021    5FR DL PICC, basilic vein. L-38cm, 1cm out.     PICC DOUBLE LUMEN PLACEMENT Right 01/04/2022    Right cephalic, 41 cm, 1 external length     PITUITARY EXCISION       tooth pulled 4/7  Right      Allergies   Allergen Reactions     Penicillins Hives and Swelling            Social History   Social History     Tobacco Use     Smoking status: Never Smoker     Smokeless tobacco: Never Used   Substance Use Topics     Alcohol use: Yes     Comment: rare     Drug use: Never      Past medical history and social history were reviewed.    Physical Examination:  There were no vitals taken for this visit.  Wt Readings  from Last 10 Encounters:   02/08/22 63 kg (138 lb 14.4 oz)   01/28/22 65.8 kg (145 lb)   01/11/22 65.1 kg (143 lb 8 oz)   12/09/21 66.7 kg (147 lb)   12/09/21 67 kg (147 lb 11.2 oz)   12/08/21 64.8 kg (142 lb 13.7 oz)   11/12/21 68 kg (150 lb)   11/11/21 69.9 kg (154 lb)   11/04/21 70 kg (154 lb 6.4 oz)   11/02/21 85.9 kg (189 lb 6.4 oz)     Constitutional: Well-appearing male in no acute distress.  Eyes: EOMI, PERRL. No scleral icterus.  ENT: Oral mucosa is moist without lesions or thrush.   Lymphatic: Neck is supple without cervical or supraclavicular lymphadenopathy. No axillary lymphadenopathy.  Breast: No palpable abnormalities in either breast. Nipples everted without drainage. No erythema or pitting.    Cardiovascular: Regular rate and rhythm. No murmurs, gallops, or rubs. No peripheral edema.  Respiratory: Clear to auscultation bilaterally. No wheezes or crackles.  Gastrointestinal: Bowel sounds present. Abdomen soft, non-tender. No palpable hepatosplenomegaly or masses.   Neurologic: Cranial nerves II through XII are grossly intact.  Skin: No rashes, petechiae, or bruising noted on exposed skin.    Laboratory Data:        Assessment and Plan:          Maynor Rios PA-C  Department of Hematology and Oncology  [facility]         Again, thank you for allowing me to participate in the care of your patient.        Sincerely,        GERALDO Mullins

## 2022-02-21 NOTE — PROGRESS NOTES
Ifrah is a 71 year old who is being evaluated via a billable video visit.  Currently in State of [place]>      How would you like to obtain your AVS? MyChart  If the video visit is dropped, the invitation should be resent by: Text to cell phone: [phone]  Will anyone else be joining your video visit? Yes: . How would they like to receive their invitation? Other e-mail: n/a      Video Start Time: 2:30pm    Video-Visit Details    Type of service:  Video Visit    Video End Time:3:00pm    Originating Location (pt. Location): Home    Distant Location (provider location):  [facility]     Platform used for Video Visit: Thomas Choe    Oncology/Hematology Visit Note  Feb 21, 2022    Reason for Visit: Follow up of spindle cell sarcoma     History of Present Illness: Ifrah Huitron is a 71 year old male with PMH rosacea, pituitary macroadenoma s/p resection, GERD, kidney stones, DJD with metastatic spindle cell sarcoma. He presented to his PCP March 2021 with chest pain/left shoulder and back pain that led to imaging which revealed multiple lung mets. There were difficulties in getting diagnostic biopsy, eventually biopsy of mediastinal mass with spindle cell sarcoma. There was high PD-L1 expression (90%). Baseline imaging 6/21/21 with progression of lung mets and left-sided subdiaphragmatic mass, stable liver lesions. 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      He started Keytruda 6/21/21. CT 8/2/21 with positive response to treatment. CT 10/18/21 with mixed response- LUQ mass larger, anterior mediastinal and left anterior pleural mass smaller. Keytruda stopped.     Started on Doxil + Ifosfamide 10/26/21. Poorly tolerated with mucositis, nausea, poor oral intake. CT with stable to positive response.      Received C2 12/1/21 (delayed for tolerance issues) with 10% dose reduction.     Received C3  1/4/22 with same 10% dose reduction. He had issues with nausea inpatient along with recurrent thrush.      Received C4 2/2/22 with same 10% dose reduction. Had more significant neurotoxicity so only received 5.5 days. Symptoms resolved after stopping Ifosfamide.     Interval History:  Ifrah was called today for follow up. He is feeling much better this cycle than previous cycles. He felt tired and slept the first two days he was home, but now feeling much better. Mouth thrush improved with fluconazole, last dose today. He reports open sores in mouth that started about 3 or 4 days ago. Mouth burns with using toothpaste but not with eating foods. Appetite improved today and food taste better. His nausea is well controlled with Zyprexa at night and Zofran/Compazine during the day.     He continues to have skin sores in his hips, knees, elbows, and back that are bothersome when sleeping. He notes sores are healing, scabbed over, and improvement with neosporin and triamcinolone cream.     He denies chest pain. His cancer back/chest wall pain has resolved and he is off Celebrex for about 5 weeks and not needing anything for pain. Breath has significantly improved after blood transfusion 2/17. Mood is better on lexapro.    Review of Systems:  Patient denies fevers, chills, night sweats, unexplained weight changes, headaches, dizziness, vision or hearing changes, new lumps or bumps, chest pain, shortness of breath, abdominal pain, nausea, vomiting, changes to bowel or bladder, swelling of extremities, bleeding issues, or rash.    Current Outpatient Medications   Medication Sig Dispense Refill     acetaminophen (TYLENOL) 500 MG tablet Take 500-1,000 mg by mouth every 6 hours as needed for mild pain       calcium carbonate (TUMS) 500 MG chewable tablet Take 1 tablet (500 mg) by mouth 3 times daily as needed for heartburn       cholecalciferol (VITAMIN D-1000 MAX ST) 1000 units TABS Take 1,000 Units by mouth daily        doxepin  "(SINEQUAN) 10 MG/ML (HIGH CONC) solution Take 2 mLs (20 mg) by mouth every 4 hours as needed for other (mouth pain) . Mix with equal parts tap water. Swish and hold in mouth ~60 seconds then spit out. 118 mL 3     escitalopram (LEXAPRO) 10 MG tablet Take 1 tablet (10 mg) by mouth daily 30 tablet 1     fluconazole (DIFLUCAN) 200 MG tablet Take 1 tablet (200 mg) by mouth daily 7 tablet 0     guaiFENesin-codeine (GUAIFENESIN AC) 100-10 MG/5ML syrup Take 10 mLs by mouth every 4 hours as needed for cough 118 mL 0     hydrocortisone (CORTEF) 10 MG tablet Take 20 mg in the morning and 10 mg in the afternoon 270 tablet 3     Insulin Syringe-Needle U-100 27.5G X 5/8\" 2 ML MISC 1 each daily as needed (with solucortef for adrenal crisis) 10 each 1     levothyroxine (SYNTHROID/LEVOTHROID) 75 MCG tablet Take 1 tablet (75 mcg) by mouth every morning 90 tablet 3     LORazepam (ATIVAN) 0.5 MG tablet Take 1 tablet (0.5 mg) by mouth every 4 hours as needed for nausea or vomiting . Caution: causes sedation. 30 tablet 0     Menthol-Methyl Salicylate (DALIA MALIK GREASELESS) cream Apply topically every 6 hours as needed       metroNIDAZOLE (METROGEL) 1 % external gel Apply topically daily .Try stronger dose due to increased symptoms after chemo. 30 g 0     nystatin (MYCOSTATIN) 200887 UNIT/ML suspension Take 5 mLs (500,000 Units) by mouth 4 times daily Hold while taking Fluconazole, could resume after Fluconazole if you have ongoing coating on tongue. 473 mL 0     OLANZapine (ZYPREXA) 5 MG tablet Take 1 tablet (5 mg) by mouth At Bedtime Continue until your nausea resolves 30 tablet 1     omeprazole (PRILOSEC) 20 MG DR capsule Take 2 capsules (40 mg) by mouth daily 60 capsule 1     ondansetron (ZOFRAN) 4 MG tablet Take 1-2 tablets (4-8 mg) by mouth every 8 hours as needed for nausea 60 tablet 3     phosphorus tablet 250 mg (PHOSPHA 250 NEUTRAL) 250 MG per tablet Take 2 tablets (500 mg) by mouth daily 60 tablet 1     potassium chloride ER " (K-TAB) 20 MEQ CR tablet Take 1 tablet (20 mEq) by mouth 3 times daily 60 tablet 1     prochlorperazine (COMPAZINE) 5 MG tablet Take 1 tablet (5 mg) by mouth every 6 hours as needed for nausea or vomiting . Caution: causes sedation. 30 tablet 1     SUMAtriptan (IMITREX) 50 MG tablet Take 1 tablet (50 mg) by mouth at onset of headache for migraine May repeat in 2 hours. Max 4 tablets/24 hours. 10 tablet 3     triamcinolone (KENALOG) 0.1 % external cream Apply topically 2 times daily to bothersome skin areas. 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     Thyroid disease     removed pituitary gland     Past Surgical History:   Procedure Laterality Date     COLONOSCOPY N/A 12/17/2020    Procedure: COLONOSCOPY;  Surgeon: Ken Camacho MD;  Location: [facility]     ENT SURGERY       HERNIA REPAIR       INSERT PORT VASCULAR ACCESS Right 1/28/2022    Procedure: INSERTION, VASCULAR ACCESS PORT;  Surgeon: Daniel Kinney MD;  [place] [facility] [place]     IR CHEST PORT PLACEMENT > 5 YRS OF AGE  1/28/2022     LARYNGOSCOPY, EXCISE VOCAL CORD LESION MICROSCOPIC, COMBINED Left 07/01/2021    Procedure: MICROLARYNGOSCOPY, LEFT TRUE VOCAL CORD INJECTION WITH PROLARYN;  Surgeon: Kyree Bearden MD;  Location: [facility]     PHACOEMULSIFICATION WITH STANDARD INTRAOCULAR LENS IMPLANT Right 03/10/2021    Procedure: Cataract removal with implant.;  Surgeon: Jamir Mac MD;  Location: [facility]     PHACOEMULSIFICATION WITH STANDARD INTRAOCULAR LENS IMPLANT Left 04/05/2021    Procedure: Cataract removal with implant.;  Surgeon: Jamir Mac MD;  Location: [facility]     PICC DOUBLE LUMEN PLACEMENT Right 12/01/2021    5FR DL PICC, basilic vein. L-38cm, 1cm out.     PICC DOUBLE LUMEN PLACEMENT Right 01/04/2022    Right cephalic, 41 cm, 1 external length     PITUITARY EXCISION       tooth pulled 4/7  Right       Allergies   Allergen Reactions     Penicillins Hives and Swelling            Social History   Social History     Tobacco Use     Smoking status: Never Smoker     Smokeless tobacco: Never Used   Substance Use Topics     Alcohol use: Yes     Comment: rare     Drug use: Never      Past medical history and social history were reviewed.    Physical Examination:  There were no vitals taken for this visit.  Wt Readings from Last 10 Encounters:   02/08/22 63 kg (138 lb 14.4 oz)   01/28/22 65.8 kg (145 lb)   01/11/22 65.1 kg (143 lb 8 oz)   12/09/21 66.7 kg (147 lb)   12/09/21 67 kg (147 lb 11.2 oz)   12/08/21 64.8 kg (142 lb 13.7 oz)   11/12/21 68 kg (150 lb)   11/11/21 69.9 kg (154 lb)   11/04/21 70 kg (154 lb 6.4 oz)   11/02/21 85.9 kg (189 lb 6.4 oz)     Video physical exam  General: Patient appears well in no acute distress.   Skin: No visualized rash or lesions on visualized skin  Eyes: EOMI, no erythema, sclera icterus or discharge noted  Resp: Appears to be breathing comfortably without accessory muscle usage, speaking in full sentences, no cough  MSK: Appears to have normal range of motion based on visualized movements  Neurologic: No apparent tremors, facial movements symmetric  Psych: affect normal, alert and oriented    The rest of a comprehensive physical examination is deferred due to PHE (public health emergency) video restrictions    Laboratory Data:  Results for MARISOL XIE (MRN [id number]) as of 2/21/2022 15:37   Ref. Range 2/21/2022 10:24   Sodium Latest Ref Range: 133 - 144 mmol/L 138   Potassium Latest Ref Range: 3.4 - 5.3 mmol/L 4.4   Chloride Latest Ref Range: 94 - 109 mmol/L 105   Carbon Dioxide Latest Ref Range: 20 - 32 mmol/L 29   Urea Nitrogen Latest Ref Range: 7 - 30 mg/dL 18   Creatinine Latest Ref Range: 0.66 - 1.25 mg/dL 1.03   GFR Estimate Latest Ref Range: >60 mL/min/1.73m2 77   Calcium Latest Ref Range: 8.5 - 10.1 mg/dL 8.9   Anion Gap Latest Ref Range: 3 - 14 mmol/L 4   Magnesium  Latest Ref Range: 1.8 - 2.6 mg/dL 1.6 (L)   Phosphorus Latest Ref Range: 2.5 - 4.5 mg/dL 2.7   Albumin Latest Ref Range: 3.4 - 5.0 g/dL 3.2 (L)   Protein Total Latest Ref Range: 6.8 - 8.8 g/dL 6.4 (L)   Bilirubin Total Latest Ref Range: 0.2 - 1.3 mg/dL 0.1 (L)   Alkaline Phosphatase Latest Ref Range: 40 - 150 U/L 174 (H)   ALT Latest Ref Range: 0 - 70 U/L 24   AST Latest Ref Range: 0 - 45 U/L 16   Glucose Latest Ref Range: 70 - 99 mg/dL 136 (H)   WBC Latest Ref Range: 4.0 - 11.0 10e3/uL 6.2   Hemoglobin Latest Ref Range: 13.3 - 17.7 g/dL 8.7 (L)   Hematocrit Latest Ref Range: 40.0 - 53.0 % 27.9 (L)   Platelet Count Latest Ref Range: 150 - 450 10e3/uL 67 (L)   RBC Count Latest Ref Range: 4.40 - 5.90 10e6/uL 2.86 (L)   MCV Latest Ref Range: 78 - 100 fL 98   MCH Latest Ref Range: 26.5 - 33.0 pg 30.4   MCHC Latest Ref Range: 31.5 - 36.5 g/dL 31.2 (L)   RDW Latest Ref Range: 10.0 - 15.0 % 15.9 (H)   % Neutrophils Latest Units: % 72   % Lymphocytes Latest Units: % 21   % Monocytes Latest Units: % 3   % Eosinophils Latest Units: % 0   % Basophils Latest Units: % 1   % Metamyelocytes Latest Units: % 2   % Myelocytes Latest Units: % 1   Absolute Basophils Latest Ref Range: 0.0 - 0.2 10e3/uL 0.1   Absolute Neutrophil Latest Ref Range: 1.6 - 8.3 10e3/uL 4.5   Absolute Lymphocytes Latest Ref Range: 0.8 - 5.3 10e3/uL 1.3   Absolute Monocytes Latest Ref Range: 0.0 - 1.3 10e3/uL 0.2   Absolute Eosinophils Latest Ref Range: 0.0 - 0.7 10e3/uL 0.0   Absolute Metamyelocytes Latest Ref Range: <=0.0 10e3/uL 0.1 (H)   Absolute Myelocytes Latest Ref Range: <=0.0 10e3/uL 0.1 (H)   RBC Morphology Unknown Confirmed RBC Indices   Platelet Morphology Latest Ref Range: Automated Count Confirmed. Platelet morphology is normal.  Automated Count Confirmed. Platelet morphology is normal.   Polychromasia Latest Ref Range: None Seen  Slight (A)   Teardrop Cells Latest Ref Range: None Seen  Slight (A)   Toxic Granulation Latest Ref Range: None Seen   Present (A)       Assessment and Plan:  1. 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 High PD-L1. Was on Keytruda but had progression, now on Doxil + Ifos started 10/26/21. Port placed 1/28/22.     Did not tolerate well-had significant mucositis, poor PO intake, nausea, and electrolyte derrangements. He has had less cancer pain and CT with stable disease. Cycle 2 given 12/1/21 with 10% dose reduction and did better. Cycle 3 given 1/4/22 at same dose as C2- did have nausea and hypokalemia/hypophos. Cycle 4 given 2/2/22 at same dose as C3-more issues with nausea and dehydration, also had significant neurotoxicity on day 5 and treatment stopped early.     He is doing much better today. See supportive cares below. Clinically he continues to note improvement in cancer pain. Discussed with Dr. Wolff and will do Doxil + Ifos with further dose reduction for cycle 5, likely 80% of both. Will determine final dose at his next visit. Plan to admit for cycle 5 3/10/22. CT scheduled 3/8/22.      MARLEEN visit 3/9/2022 after CT. 2x weekly labs and infusions in [place] scheduled.       2. GI  Dyspepsia: Continue Omeprazole 40mg daily. Continue Tums PRN     CINV: Continue Zofran daily and Zyprexa at bedtime.Continue Compazine and Lorazepam PRN. Continue Emend inpatient on day 1 and day 6.       LFT elevation: 2/2 statin, improving     Gas: Continue Gas-x PRN      Diarrhea: Inpatient issue, C. Diff was negative. Imodium and Pepto Bismol PRN.      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 Cal Saba endocrine.     Hypothyroidism continue Synthroid 75mcg daily.       4. Derm  Rosacea: Long standing issue, continue Metrogel PRN     Hand/foot: 2/2 Doxil, icing hands and feet. Continue emollients at home. Kenalog cream for bothersome/blistering and red areas.  Discussed with Dr. Wolff and will have Doxil dose reduction for cycle 5. Skin areas improving.      5.  MSK  Left shoulder/back/chest wall pain 2/2 tumor, following with palliative. No pain currently, off all pain medications.       6. ENT  Hoarseness: Thought 2/2 mediastinal mass vs irritation from prior biopsy. Following with ENT, s/p vocal cord infection 7/1/21. Following with voice clinic. This is improved.     Mucositis/Thrush: Significant, 2/2 Doxil. Better with dose reduction. Completed fluconazole dose today. Notes improvement in thrush. Recommend nystatin once or twice a day to prevent reoccurrence. Notes new open sores in mouth. Continue salt/soda rinses. Recommend magic mouth was to help with pain and using kids toothpaste with orange or fruit flavor as mint toothpaste are more bothersome.        7. Pulm/Cards  Dyspnea with talking, prior work-up with CT PE negative for PE, infection, pneumonitis; troponin negative; COVID negative. Sating well on RA. Agree with speech pathology thoughts on laryngeal dysfunction. Symptoms improved after infusion on 2/17.      Continue Guaifenesin-Coedine PRN for cough, much improved.      Saw cardiology, echo WNL. Has mild CAD, recommended to start on statin however now HELD for LFT elevation, improving.      Had elevated BP inpatient. Monitor. Hesitant to start antihypertensives given prior issues with dehydration and lightheadedness.      Continue compression stockings for edema, improving.     EKG 2/17/22 QTc WNL     8. FEN  Hypokalemia: 2/2 Ifos and poor PO intake. Currently on 60meq daily. Received potassium in infusion today, slowly improving. Continue on current dose and twice weekly montioring. Ideally would wean off if levels remain stable.     Hypophosphatemia: 2/2 ifos and poor PO intake. Currently on 500mg daily. Keep on current dose and twice weekly monitoring. Ideally would wean off if levels remain stable.    Hypomag: Mg 1.6 today. Recheck Thursday and if <1.6 give IV magnesium.      Poor PO intake: 2/2 mucositis and nausea as above. Was poor with thrush, now  improved.      Dehydration: Twice weekly fluids at [place], edema improved.      9. Psych/Neuro  Anxiety/depression: Hx of issues, recurrent issue when he was feeling poorly from treatment. Started on Lexapro by inpatient team which has helped.     Continue PRN Imitrex for migraines.      Had neuro toxicity inpatient with cycle 4 (monoclonic jerking, syncope, halluncinations), now resolved. Plan for further ifosfamide dose reduction as above.      10. Heme  Pancytopenia 2/2 chemotherapy. 1 unit pRBC given 2/17 for Hgb at 7.4 and symptomatic with WALKER. Hgb 8.7 today. Recheck plt and ANC on Thursday, monitor for bleeding or fevers.    FINAL PLAN  -Continue current home meds as is.   -Recheck labs (CBC, CMP, Mag, Phos) Thursday 2/23  -Give IV magnesium per protocol on Thursday if still low  -CT scan 3/8/22  -Follow-up with MARLEEN 3/9/22 virtually  -Cycle 5 Doxil + Ifos with dose reduction 3/10/22    I saw the patient in conjunction with Chichi Levine NP student and agree with her assessment and documentation.    35 minutes spent on the date of the encounter doing chart review, review of test results, interpretation of tests, patient visit and documentation     Maynor Rios PA-C  Department of Hematology and Oncology  [facility]

## 2022-02-21 NOTE — Clinical Note
"    2/21/2022         RE: Ifrah Huitron  [address] [place] [address]        Dear Colleague,    Thank you for referring your patient, Ifrah Huitron, to the [facility]. Please see a copy of my visit note below.    Ifrah is a 71 year old who is being evaluated via a billable video visit.  Currently in State of [place]>      How would you like to obtain your AVS? MyChart  If the video visit is dropped, the invitation should be resent by: Text to cell phone: [phone]  Will anyone else be joining your video visit? Yes: . How would they like to receive their invitation? Other e-mail: n/a  {If patient encounters technical issues they should call [phone] :[phone]}    Video Start Time: {video visit start/end time for provider to select:760572}  Video-Visit Details    Type of service:  Video Visit    Video End Time:{video visit start/end time for provider to select:736841}    Originating Location (pt. Location): {video visit patient location:[id number]::\"Home\"}    Distant Location (provider location):  [facility]     Platform used for Video Visit: {Virtual Visit Platforms:002252::\"[employer]"}   Rolanda Choe      Oncology/Hematology Visit Note  Feb 21, 2022    Reason for Visit: Follow up of spindle cell sarcoma     History of Present Illness: Ifrah Huitron is a 71 year old male with PMH rosacea, pituitary macroadenoma s/p resection, GERD, kidney stones, DJD with metastatic spindle cell sarcoma. He presented to his PCP March 2021 with chest pain/left shoulder and back pain that led to imaging which revealed multiple lung mets. There were difficulties in getting diagnostic biopsy, eventually biopsy of mediastinal mass with spindle cell sarcoma. There was high PD-L1 expression (90%). Baseline imaging 6/21/21 with progression of lung mets and left-sided subdiaphragmatic mass, stable liver lesions. He saw ENT for hoarseness thought 2/2 left true vocal fold " motion impairment from mediastinal mass-underwent injection 7/1/21.      He started Keytruda 6/21/21. CT 8/2/21 with positive response to treatment. CT 10/18/21 with mixed response- LUQ mass larger, anterior mediastinal and left anterior pleural mass smaller. Keytruda stopped.     Started on Doxil + Ifosfamide 10/26/21. Poorly tolerated with mucositis, nausea, poor oral intake. CT with stable to positive response.      Received C2 12/1/21 (delayed for tolerance issues) with 10% dose reduction.     Received C3 1/4/22 with same 10% dose reduction. He had issues with nausea inpatient along with recurrent thrush.      Received C4 2/2/22 with same 10% dose reduction. Had more significant neurotoxicity so only received 5.5 days. Symptoms resolved after stopping Ifosfamide.     Interval History:      Review of Systems:  Patient denies fevers, chills, night sweats, unexplained weight changes, headaches, dizziness, vision or hearing changes, new lumps or bumps, chest pain, shortness of breath, cough, abdominal pain, nausea, vomiting, changes to bowel or bladder, swelling of extremities, bleeding issues, or rash.    Current Outpatient Medications   Medication Sig Dispense Refill     acetaminophen (TYLENOL) 500 MG tablet Take 500-1,000 mg by mouth every 6 hours as needed for mild pain       calcium carbonate (TUMS) 500 MG chewable tablet Take 1 tablet (500 mg) by mouth 3 times daily as needed for heartburn       cholecalciferol (VITAMIN D-1000 MAX ST) 1000 units TABS Take 1,000 Units by mouth daily        doxepin (SINEQUAN) 10 MG/ML (HIGH CONC) solution Take 2 mLs (20 mg) by mouth every 4 hours as needed for other (mouth pain) . Mix with equal parts tap water. Swish and hold in mouth ~60 seconds then spit out. 118 mL 3     escitalopram (LEXAPRO) 10 MG tablet Take 1 tablet (10 mg) by mouth daily 30 tablet 1     fluconazole (DIFLUCAN) 200 MG tablet Take 1 tablet (200 mg) by mouth daily 7 tablet 0     guaiFENesin-codeine  "(GUAIFENESIN AC) 100-10 MG/5ML syrup Take 10 mLs by mouth every 4 hours as needed for cough 118 mL 0     hydrocortisone (CORTEF) 10 MG tablet Take 20 mg in the morning and 10 mg in the afternoon 270 tablet 3     Insulin Syringe-Needle U-100 27.5G X 5/8\" 2 ML MISC 1 each daily as needed (with solucortef for adrenal crisis) 10 each 1     levothyroxine (SYNTHROID/LEVOTHROID) 75 MCG tablet Take 1 tablet (75 mcg) by mouth every morning 90 tablet 3     LORazepam (ATIVAN) 0.5 MG tablet Take 1 tablet (0.5 mg) by mouth every 4 hours as needed for nausea or vomiting . Caution: causes sedation. 30 tablet 0     Menthol-Methyl Salicylate (DALIA MALIK GREASELESS) cream Apply topically every 6 hours as needed       metroNIDAZOLE (METROGEL) 1 % external gel Apply topically daily .Try stronger dose due to increased symptoms after chemo. 30 g 0     nystatin (MYCOSTATIN) 784692 UNIT/ML suspension Take 5 mLs (500,000 Units) by mouth 4 times daily Hold while taking Fluconazole, could resume after Fluconazole if you have ongoing coating on tongue. 473 mL 0     OLANZapine (ZYPREXA) 5 MG tablet Take 1 tablet (5 mg) by mouth At Bedtime Continue until your nausea resolves 30 tablet 1     omeprazole (PRILOSEC) 20 MG DR capsule Take 2 capsules (40 mg) by mouth daily 60 capsule 1     ondansetron (ZOFRAN) 4 MG tablet Take 1-2 tablets (4-8 mg) by mouth every 8 hours as needed for nausea 60 tablet 3     phosphorus tablet 250 mg (PHOSPHA 250 NEUTRAL) 250 MG per tablet Take 2 tablets (500 mg) by mouth daily 60 tablet 1     potassium chloride ER (K-TAB) 20 MEQ CR tablet Take 1 tablet (20 mEq) by mouth 3 times daily 60 tablet 1     prochlorperazine (COMPAZINE) 5 MG tablet Take 1 tablet (5 mg) by mouth every 6 hours as needed for nausea or vomiting . Caution: causes sedation. 30 tablet 1     SUMAtriptan (IMITREX) 50 MG tablet Take 1 tablet (50 mg) by mouth at onset of headache for migraine May repeat in 2 hours. Max 4 tablets/24 hours. 10 tablet 3     " triamcinolone (KENALOG) 0.1 % external cream Apply topically 2 times daily to bothersome skin areas. 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     Thyroid disease     removed pituitary gland     Past Surgical History:   Procedure Laterality Date     COLONOSCOPY N/A 12/17/2020    Procedure: COLONOSCOPY;  Surgeon: Ken Camacho MD;  Location: [facility]     ENT SURGERY       HERNIA REPAIR       INSERT PORT VASCULAR ACCESS Right 1/28/2022    Procedure: INSERTION, VASCULAR ACCESS PORT;  Surgeon: Daniel Kinney MD;  [facility] [facility]     IR CHEST PORT PLACEMENT > 5 YRS OF AGE  1/28/2022     LARYNGOSCOPY, EXCISE VOCAL CORD LESION MICROSCOPIC, COMBINED Left 07/01/2021    Procedure: MICROLARYNGOSCOPY, LEFT TRUE VOCAL CORD INJECTION WITH PROLARYN;  Surgeon: Kyree Bearden MD;  Location: [facility]     PHACOEMULSIFICATION WITH STANDARD INTRAOCULAR LENS IMPLANT Right 03/10/2021    Procedure: Cataract removal with implant.;  Surgeon: Jamir Mac MD;  Location: [facility]     PHACOEMULSIFICATION WITH STANDARD INTRAOCULAR LENS IMPLANT Left 04/05/2021    Procedure: Cataract removal with implant.;  Surgeon: Jamir Mac MD;  Location: [facility]     PICC DOUBLE LUMEN PLACEMENT Right 12/01/2021    5FR DL PICC, basilic vein. L-38cm, 1cm out.     PICC DOUBLE LUMEN PLACEMENT Right 01/04/2022    Right cephalic, 41 cm, 1 external length     PITUITARY EXCISION       tooth pulled 4/7  Right      Allergies   Allergen Reactions     Penicillins Hives and Swelling            Social History   Social History     Tobacco Use     Smoking status: Never Smoker     Smokeless tobacco: Never Used   Substance Use Topics     Alcohol use: Yes     Comment: rare     Drug use: Never      Past medical history and social history were reviewed.    Physical Examination:  There were no vitals taken for this visit.  Wt Readings  from Last 10 Encounters:   02/08/22 63 kg (138 lb 14.4 oz)   01/28/22 65.8 kg (145 lb)   01/11/22 65.1 kg (143 lb 8 oz)   12/09/21 66.7 kg (147 lb)   12/09/21 67 kg (147 lb 11.2 oz)   12/08/21 64.8 kg (142 lb 13.7 oz)   11/12/21 68 kg (150 lb)   11/11/21 69.9 kg (154 lb)   11/04/21 70 kg (154 lb 6.4 oz)   11/02/21 85.9 kg (189 lb 6.4 oz)     Constitutional: Well-appearing male in no acute distress.  Eyes: EOMI, PERRL. No scleral icterus.  ENT: Oral mucosa is moist without lesions or thrush.   Lymphatic: Neck is supple without cervical or supraclavicular lymphadenopathy. No axillary lymphadenopathy.  Breast: No palpable abnormalities in either breast. Nipples everted without drainage. No erythema or pitting.    Cardiovascular: Regular rate and rhythm. No murmurs, gallops, or rubs. No peripheral edema.  Respiratory: Clear to auscultation bilaterally. No wheezes or crackles.  Gastrointestinal: Bowel sounds present. Abdomen soft, non-tender. No palpable hepatosplenomegaly or masses.   Neurologic: Cranial nerves II through XII are grossly intact.  Skin: No rashes, petechiae, or bruising noted on exposed skin.    Laboratory Data:        Assessment and Plan:          Maynor Rios PA-C  Department of Hematology and Oncology  [facility]         Again, thank you for allowing me to participate in the care of your patient.        Sincerely,        GERALDO Mullins

## 2022-02-24 ENCOUNTER — LAB (OUTPATIENT)
Dept: INFUSION THERAPY | Facility: CLINIC | Age: 72
End: 2022-02-24
Attending: PHYSICIAN ASSISTANT
Payer: MEDICARE

## 2022-02-24 ENCOUNTER — HOSPITAL ENCOUNTER (OUTPATIENT)
Facility: CLINIC | Age: 72
End: 2022-02-24
Attending: STUDENT IN AN ORGANIZED HEALTH CARE EDUCATION/TRAINING PROGRAM | Admitting: STUDENT IN AN ORGANIZED HEALTH CARE EDUCATION/TRAINING PROGRAM
Payer: MEDICARE

## 2022-02-24 VITALS
DIASTOLIC BLOOD PRESSURE: 79 MMHG | SYSTOLIC BLOOD PRESSURE: 132 MMHG | RESPIRATION RATE: 16 BRPM | HEART RATE: 71 BPM | TEMPERATURE: 98.2 F

## 2022-02-24 DIAGNOSIS — T45.1X5A CHEMOTHERAPY-INDUCED NAUSEA: ICD-10-CM

## 2022-02-24 DIAGNOSIS — R11.0 CHEMOTHERAPY-INDUCED NAUSEA: ICD-10-CM

## 2022-02-24 DIAGNOSIS — C49.9 SPINDLE CELL SARCOMA (H): ICD-10-CM

## 2022-02-24 DIAGNOSIS — C49.9 SARCOMA (H): Primary | ICD-10-CM

## 2022-02-24 LAB
ALBUMIN SERPL-MCNC: 3.3 G/DL (ref 3.4–5)
ALP SERPL-CCNC: 180 U/L (ref 40–150)
ALT SERPL W P-5'-P-CCNC: 33 U/L (ref 0–70)
ANION GAP SERPL CALCULATED.3IONS-SCNC: 3 MMOL/L (ref 3–14)
AST SERPL W P-5'-P-CCNC: 20 U/L (ref 0–45)
BASOPHILS # BLD MANUAL: 0.2 10E3/UL (ref 0–0.2)
BASOPHILS NFR BLD MANUAL: 2 %
BILIRUB SERPL-MCNC: 0.2 MG/DL (ref 0.2–1.3)
BUN SERPL-MCNC: 11 MG/DL (ref 7–30)
CALCIUM SERPL-MCNC: 9.6 MG/DL (ref 8.5–10.1)
CHLORIDE BLD-SCNC: 107 MMOL/L (ref 94–109)
CO2 SERPL-SCNC: 32 MMOL/L (ref 20–32)
CREAT SERPL-MCNC: 1.04 MG/DL (ref 0.66–1.25)
EOSINOPHIL # BLD MANUAL: 0 10E3/UL (ref 0–0.7)
EOSINOPHIL NFR BLD MANUAL: 0 %
ERYTHROCYTE [DISTWIDTH] IN BLOOD BY AUTOMATED COUNT: 16.8 % (ref 10–15)
GFR SERPL CREATININE-BSD FRML MDRD: 76 ML/MIN/1.73M2
GLUCOSE BLD-MCNC: 108 MG/DL (ref 70–99)
HCT VFR BLD AUTO: 29.5 % (ref 40–53)
HGB BLD-MCNC: 9.1 G/DL (ref 13.3–17.7)
LYMPHOCYTES # BLD MANUAL: 1.2 10E3/UL (ref 0.8–5.3)
LYMPHOCYTES NFR BLD MANUAL: 16 %
MAGNESIUM SERPL-MCNC: 2 MG/DL (ref 1.6–2.3)
MCH RBC QN AUTO: 30.5 PG (ref 26.5–33)
MCHC RBC AUTO-ENTMCNC: 30.8 G/DL (ref 31.5–36.5)
MCV RBC AUTO: 99 FL (ref 78–100)
MONOCYTES # BLD MANUAL: 0.9 10E3/UL (ref 0–1.3)
MONOCYTES NFR BLD MANUAL: 11 %
MYELOCYTES # BLD MANUAL: 0.1 10E3/UL
MYELOCYTES NFR BLD MANUAL: 1 %
NEUTROPHILS # BLD MANUAL: 5.5 10E3/UL (ref 1.6–8.3)
NEUTROPHILS NFR BLD MANUAL: 70 %
NRBC # BLD AUTO: 0.1 10E3/UL
NRBC BLD MANUAL-RTO: 1 %
PHOSPHATE SERPL-MCNC: 3.2 MG/DL (ref 2.5–4.5)
PLAT MORPH BLD: ABNORMAL
PLATELET # BLD AUTO: 127 10E3/UL (ref 150–450)
POLYCHROMASIA BLD QL SMEAR: SLIGHT
POTASSIUM BLD-SCNC: 4.3 MMOL/L (ref 3.4–5.3)
PROT SERPL-MCNC: 6.5 G/DL (ref 6.8–8.8)
RBC # BLD AUTO: 2.98 10E6/UL (ref 4.4–5.9)
RBC MORPH BLD: ABNORMAL
SODIUM SERPL-SCNC: 142 MMOL/L (ref 133–144)
WBC # BLD AUTO: 7.8 10E3/UL (ref 4–11)

## 2022-02-24 PROCEDURE — 258N000003 HC RX IP 258 OP 636: Performed by: PHYSICIAN ASSISTANT

## 2022-02-24 PROCEDURE — 84100 ASSAY OF PHOSPHORUS: CPT | Performed by: PHYSICIAN ASSISTANT

## 2022-02-24 PROCEDURE — 83735 ASSAY OF MAGNESIUM: CPT | Performed by: PHYSICIAN ASSISTANT

## 2022-02-24 PROCEDURE — 96360 HYDRATION IV INFUSION INIT: CPT

## 2022-02-24 PROCEDURE — 250N000011 HC RX IP 250 OP 636: Performed by: PHYSICIAN ASSISTANT

## 2022-02-24 PROCEDURE — 36591 DRAW BLOOD OFF VENOUS DEVICE: CPT

## 2022-02-24 PROCEDURE — 80053 COMPREHEN METABOLIC PANEL: CPT | Performed by: PHYSICIAN ASSISTANT

## 2022-02-24 PROCEDURE — 85027 COMPLETE CBC AUTOMATED: CPT | Performed by: PHYSICIAN ASSISTANT

## 2022-02-24 PROCEDURE — 82040 ASSAY OF SERUM ALBUMIN: CPT | Performed by: PHYSICIAN ASSISTANT

## 2022-02-24 RX ORDER — HEPARIN SODIUM (PORCINE) LOCK FLUSH IV SOLN 100 UNIT/ML 100 UNIT/ML
5 SOLUTION INTRAVENOUS
Status: COMPLETED | OUTPATIENT
Start: 2022-02-24 | End: 2022-02-24

## 2022-02-24 RX ORDER — HEPARIN SODIUM (PORCINE) LOCK FLUSH IV SOLN 100 UNIT/ML 100 UNIT/ML
5 SOLUTION INTRAVENOUS
Status: CANCELLED
Start: 2022-02-24

## 2022-02-24 RX ADMIN — Medication 5 ML: at 11:50

## 2022-02-24 RX ADMIN — SODIUM CHLORIDE 1000 ML: 9 INJECTION, SOLUTION INTRAVENOUS at 10:35

## 2022-02-24 ASSESSMENT — PAIN SCALES - GENERAL: PAINLEVEL: NO PAIN (0)

## 2022-02-24 NOTE — PROGRESS NOTES
Infusion Nursing Note:  Ifrah Huitron presents today for IVFs.    Patient seen by provider today: No   present during visit today: Not Applicable.    Note: Patient declines need for antiemetics today.      Intravenous Access:  Implanted Port.    Treatment Conditions:  SCrt >0.9, fluids indicated.      Post Infusion Assessment:  Patient tolerated infusion without incident.  Site patent and intact, free from redness, edema or discomfort.  No evidence of extravasations.  Access discontinued per protocol.       Discharge Plan:   Patient discharged in stable condition accompanied by: self.  Departure Mode: Ambulatory.      Warren Santana RN

## 2022-02-28 ENCOUNTER — LAB (OUTPATIENT)
Dept: INFUSION THERAPY | Facility: CLINIC | Age: 72
End: 2022-02-28
Attending: PHYSICIAN ASSISTANT
Payer: MEDICARE

## 2022-02-28 VITALS — DIASTOLIC BLOOD PRESSURE: 83 MMHG | TEMPERATURE: 98.1 F | HEART RATE: 76 BPM | SYSTOLIC BLOOD PRESSURE: 145 MMHG

## 2022-02-28 DIAGNOSIS — T45.1X5A CHEMOTHERAPY-INDUCED NAUSEA: ICD-10-CM

## 2022-02-28 DIAGNOSIS — E87.6 HYPOKALEMIA: ICD-10-CM

## 2022-02-28 DIAGNOSIS — D70.1 CHEMOTHERAPY-INDUCED NEUTROPENIA (H): ICD-10-CM

## 2022-02-28 DIAGNOSIS — Z51.89 ENCOUNTER FOR OTHER SPECIFIED AFTERCARE: ICD-10-CM

## 2022-02-28 DIAGNOSIS — C49.9 SPINDLE CELL SARCOMA (H): ICD-10-CM

## 2022-02-28 DIAGNOSIS — R11.0 CHEMOTHERAPY-INDUCED NAUSEA: ICD-10-CM

## 2022-02-28 DIAGNOSIS — C49.9 SARCOMA (H): Primary | ICD-10-CM

## 2022-02-28 DIAGNOSIS — C45.9 MESOTHELIOMA, MALIGNANT (H): ICD-10-CM

## 2022-02-28 DIAGNOSIS — T45.1X5A CHEMOTHERAPY-INDUCED NEUTROPENIA (H): ICD-10-CM

## 2022-02-28 LAB
ALBUMIN SERPL-MCNC: 3 G/DL (ref 3.4–5)
ALP SERPL-CCNC: 188 U/L (ref 40–150)
ALT SERPL W P-5'-P-CCNC: 55 U/L (ref 0–70)
ANION GAP SERPL CALCULATED.3IONS-SCNC: 4 MMOL/L (ref 3–14)
AST SERPL W P-5'-P-CCNC: 45 U/L (ref 0–45)
BASOPHILS # BLD MANUAL: 0 10E3/UL (ref 0–0.2)
BASOPHILS NFR BLD MANUAL: 0 %
BILIRUB SERPL-MCNC: 0.2 MG/DL (ref 0.2–1.3)
BUN SERPL-MCNC: 16 MG/DL (ref 7–30)
CALCIUM SERPL-MCNC: 9 MG/DL (ref 8.5–10.1)
CHLORIDE BLD-SCNC: 107 MMOL/L (ref 94–109)
CO2 SERPL-SCNC: 28 MMOL/L (ref 20–32)
CREAT SERPL-MCNC: 0.95 MG/DL (ref 0.66–1.25)
DACRYOCYTES BLD QL SMEAR: SLIGHT
EOSINOPHIL # BLD MANUAL: 0 10E3/UL (ref 0–0.7)
EOSINOPHIL NFR BLD MANUAL: 0 %
ERYTHROCYTE [DISTWIDTH] IN BLOOD BY AUTOMATED COUNT: 16.9 % (ref 10–15)
GFR SERPL CREATININE-BSD FRML MDRD: 85 ML/MIN/1.73M2
GLUCOSE BLD-MCNC: 92 MG/DL (ref 70–99)
HCT VFR BLD AUTO: 30.3 % (ref 40–53)
HGB BLD-MCNC: 9.5 G/DL (ref 13.3–17.7)
LYMPHOCYTES # BLD MANUAL: 1.6 10E3/UL (ref 0.8–5.3)
LYMPHOCYTES NFR BLD MANUAL: 19 %
MAGNESIUM SERPL-MCNC: 1.9 MG/DL (ref 1.6–2.3)
MCH RBC QN AUTO: 31.1 PG (ref 26.5–33)
MCHC RBC AUTO-ENTMCNC: 31.4 G/DL (ref 31.5–36.5)
MCV RBC AUTO: 99 FL (ref 78–100)
METAMYELOCYTES # BLD MANUAL: 0.3 10E3/UL
METAMYELOCYTES NFR BLD MANUAL: 4 %
MONOCYTES # BLD MANUAL: 0.7 10E3/UL (ref 0–1.3)
MONOCYTES NFR BLD MANUAL: 9 %
NEUTROPHILS # BLD MANUAL: 5.6 10E3/UL (ref 1.6–8.3)
NEUTROPHILS NFR BLD MANUAL: 68 %
PHOSPHATE SERPL-MCNC: 2.8 MG/DL (ref 2.5–4.5)
PLAT MORPH BLD: ABNORMAL
PLATELET # BLD AUTO: 178 10E3/UL (ref 150–450)
POTASSIUM BLD-SCNC: 4.3 MMOL/L (ref 3.4–5.3)
PROT SERPL-MCNC: 6.5 G/DL (ref 6.8–8.8)
RBC # BLD AUTO: 3.05 10E6/UL (ref 4.4–5.9)
RBC MORPH BLD: ABNORMAL
SODIUM SERPL-SCNC: 139 MMOL/L (ref 133–144)
WBC # BLD AUTO: 8.3 10E3/UL (ref 4–11)

## 2022-02-28 PROCEDURE — 84100 ASSAY OF PHOSPHORUS: CPT | Performed by: PHYSICIAN ASSISTANT

## 2022-02-28 PROCEDURE — 85027 COMPLETE CBC AUTOMATED: CPT | Performed by: PHYSICIAN ASSISTANT

## 2022-02-28 PROCEDURE — 80053 COMPREHEN METABOLIC PANEL: CPT | Performed by: PHYSICIAN ASSISTANT

## 2022-02-28 PROCEDURE — 83735 ASSAY OF MAGNESIUM: CPT | Performed by: PHYSICIAN ASSISTANT

## 2022-02-28 PROCEDURE — 96360 HYDRATION IV INFUSION INIT: CPT

## 2022-02-28 PROCEDURE — 36591 DRAW BLOOD OFF VENOUS DEVICE: CPT

## 2022-02-28 PROCEDURE — 258N000003 HC RX IP 258 OP 636: Performed by: PHYSICIAN ASSISTANT

## 2022-02-28 PROCEDURE — 250N000011 HC RX IP 250 OP 636: Performed by: PHYSICIAN ASSISTANT

## 2022-02-28 RX ORDER — HEPARIN SODIUM (PORCINE) LOCK FLUSH IV SOLN 100 UNIT/ML 100 UNIT/ML
5 SOLUTION INTRAVENOUS
Status: COMPLETED | OUTPATIENT
Start: 2022-02-28 | End: 2022-02-28

## 2022-02-28 RX ORDER — POTASSIUM CHLORIDE 1500 MG/1
20 TABLET, EXTENDED RELEASE ORAL 2 TIMES DAILY
Qty: 60 TABLET | Refills: 1 | COMMUNITY
Start: 2022-02-28 | End: 2022-03-03

## 2022-02-28 RX ORDER — HEPARIN SODIUM (PORCINE) LOCK FLUSH IV SOLN 100 UNIT/ML 100 UNIT/ML
5 SOLUTION INTRAVENOUS
Status: CANCELLED
Start: 2022-02-28

## 2022-02-28 RX ADMIN — Medication 5 ML: at 12:25

## 2022-02-28 RX ADMIN — SODIUM CHLORIDE 1000 ML: 9 INJECTION, SOLUTION INTRAVENOUS at 11:23

## 2022-02-28 NOTE — PROGRESS NOTES
Infusion Nursing Note:  Ifrah Huitron presents today for IV fluids.    Patient seen by provider today: No   present during visit today: Not Applicable.    Note: pt offers no complaints today.    Intravenous Access:  Implanted Port.    Treatment Conditions:  No need for electrolyte replacement today.  Pt is given 1 liter NS for creat 0.95 per standing order.      Post Infusion Assessment:  Patient tolerated infusion without incident.  Access discontinued per protocol.       Discharge Plan:   AVS to patient via MYCHART.  Patient will return for lab recheck and possible fluids on 3/3.    Patient discharged in stable condition accompanied by: self.  Departure Mode: Ambulatory.    Deepika Quach RN

## 2022-03-02 ENCOUNTER — VIRTUAL VISIT (OUTPATIENT)
Dept: ONCOLOGY | Facility: CLINIC | Age: 72
End: 2022-03-02
Attending: PSYCHOLOGIST
Payer: MEDICARE

## 2022-03-02 DIAGNOSIS — Z53.9 ERRONEOUS ENCOUNTER--DISREGARD: Primary | ICD-10-CM

## 2022-03-02 NOTE — LETTER
3/2/2022         RE: Ifrah Huitron  [address] [place] [address]        Dear Colleague,    Thank you for referring your patient, Ifrah Huitron, to the [facility]. Please see a copy of my visit note below.      This encounter was opened in error. Please disregard.      Again, thank you for allowing me to participate in the care of your patient.      Sincerely,    Froy Hirsch PsyD

## 2022-03-03 ENCOUNTER — INFUSION THERAPY VISIT (OUTPATIENT)
Dept: INFUSION THERAPY | Facility: CLINIC | Age: 72
End: 2022-03-03
Attending: PHYSICIAN ASSISTANT
Payer: MEDICARE

## 2022-03-03 VITALS
TEMPERATURE: 98 F | RESPIRATION RATE: 16 BRPM | SYSTOLIC BLOOD PRESSURE: 119 MMHG | DIASTOLIC BLOOD PRESSURE: 79 MMHG | HEART RATE: 100 BPM

## 2022-03-03 DIAGNOSIS — T45.1X5A CHEMOTHERAPY-INDUCED NAUSEA: ICD-10-CM

## 2022-03-03 DIAGNOSIS — C49.9 SPINDLE CELL SARCOMA (H): ICD-10-CM

## 2022-03-03 DIAGNOSIS — R11.0 CHEMOTHERAPY-INDUCED NAUSEA: ICD-10-CM

## 2022-03-03 DIAGNOSIS — C49.9 SARCOMA (H): Primary | ICD-10-CM

## 2022-03-03 LAB
ALBUMIN SERPL-MCNC: 3.2 G/DL (ref 3.4–5)
ALP SERPL-CCNC: 185 U/L (ref 40–150)
ALT SERPL W P-5'-P-CCNC: 57 U/L (ref 0–70)
ANION GAP SERPL CALCULATED.3IONS-SCNC: 1 MMOL/L (ref 3–14)
AST SERPL W P-5'-P-CCNC: 45 U/L (ref 0–45)
BASOPHILS # BLD MANUAL: 0.1 10E3/UL (ref 0–0.2)
BASOPHILS NFR BLD MANUAL: 1 %
BILIRUB SERPL-MCNC: 0.3 MG/DL (ref 0.2–1.3)
BUN SERPL-MCNC: 14 MG/DL (ref 7–30)
CALCIUM SERPL-MCNC: 9.3 MG/DL (ref 8.5–10.1)
CHLORIDE BLD-SCNC: 108 MMOL/L (ref 94–109)
CO2 SERPL-SCNC: 30 MMOL/L (ref 20–32)
CREAT SERPL-MCNC: 1.06 MG/DL (ref 0.66–1.25)
EOSINOPHIL # BLD MANUAL: 0 10E3/UL (ref 0–0.7)
EOSINOPHIL NFR BLD MANUAL: 0 %
ERYTHROCYTE [DISTWIDTH] IN BLOOD BY AUTOMATED COUNT: 17 % (ref 10–15)
GFR SERPL CREATININE-BSD FRML MDRD: 74 ML/MIN/1.73M2
GLUCOSE BLD-MCNC: 118 MG/DL (ref 70–99)
HCT VFR BLD AUTO: 31.6 % (ref 40–53)
HGB BLD-MCNC: 9.9 G/DL (ref 13.3–17.7)
LYMPHOCYTES # BLD MANUAL: 2.1 10E3/UL (ref 0.8–5.3)
LYMPHOCYTES NFR BLD MANUAL: 23 %
MAGNESIUM SERPL-MCNC: 2 MG/DL (ref 1.6–2.3)
MCH RBC QN AUTO: 30.8 PG (ref 26.5–33)
MCHC RBC AUTO-ENTMCNC: 31.3 G/DL (ref 31.5–36.5)
MCV RBC AUTO: 98 FL (ref 78–100)
MONOCYTES # BLD MANUAL: 1.3 10E3/UL (ref 0–1.3)
MONOCYTES NFR BLD MANUAL: 14 %
NEUTROPHILS # BLD MANUAL: 5.6 10E3/UL (ref 1.6–8.3)
NEUTROPHILS NFR BLD MANUAL: 62 %
PHOSPHATE SERPL-MCNC: 3.1 MG/DL (ref 2.5–4.5)
PLAT MORPH BLD: NORMAL
PLATELET # BLD AUTO: 213 10E3/UL (ref 150–450)
POTASSIUM BLD-SCNC: 5.6 MMOL/L (ref 3.4–5.3)
PROT SERPL-MCNC: 6.8 G/DL (ref 6.8–8.8)
RBC # BLD AUTO: 3.21 10E6/UL (ref 4.4–5.9)
RBC MORPH BLD: NORMAL
SODIUM SERPL-SCNC: 139 MMOL/L (ref 133–144)
WBC # BLD AUTO: 9 10E3/UL (ref 4–11)

## 2022-03-03 PROCEDURE — 83735 ASSAY OF MAGNESIUM: CPT | Performed by: PHYSICIAN ASSISTANT

## 2022-03-03 PROCEDURE — 36591 DRAW BLOOD OFF VENOUS DEVICE: CPT

## 2022-03-03 PROCEDURE — 80053 COMPREHEN METABOLIC PANEL: CPT | Performed by: PHYSICIAN ASSISTANT

## 2022-03-03 PROCEDURE — 258N000003 HC RX IP 258 OP 636: Performed by: PHYSICIAN ASSISTANT

## 2022-03-03 PROCEDURE — 250N000011 HC RX IP 250 OP 636: Performed by: PHYSICIAN ASSISTANT

## 2022-03-03 PROCEDURE — 84100 ASSAY OF PHOSPHORUS: CPT | Performed by: PHYSICIAN ASSISTANT

## 2022-03-03 PROCEDURE — 96360 HYDRATION IV INFUSION INIT: CPT

## 2022-03-03 PROCEDURE — 85014 HEMATOCRIT: CPT | Performed by: PHYSICIAN ASSISTANT

## 2022-03-03 RX ORDER — HEPARIN SODIUM (PORCINE) LOCK FLUSH IV SOLN 100 UNIT/ML 100 UNIT/ML
5 SOLUTION INTRAVENOUS
Status: COMPLETED | OUTPATIENT
Start: 2022-03-03 | End: 2022-03-03

## 2022-03-03 RX ORDER — HEPARIN SODIUM (PORCINE) LOCK FLUSH IV SOLN 100 UNIT/ML 100 UNIT/ML
5 SOLUTION INTRAVENOUS
Status: CANCELLED
Start: 2022-03-03

## 2022-03-03 RX ADMIN — Medication 5 ML: at 11:19

## 2022-03-03 RX ADMIN — SODIUM CHLORIDE 1000 ML: 9 INJECTION, SOLUTION INTRAVENOUS at 10:42

## 2022-03-03 NOTE — PROGRESS NOTES
Infusion Nursing Note:  Ifrah Huitron presents today for IVF.    Patient seen by provider today: No   present during visit today: Not Applicable.    Note: Pt reports he's feeling run down and like he needs fluids today regardless of lab results. He reports a noted improvement on the days he get fluids compared to the days he doesn't and feels he needs them today.      Intravenous Access:  Port accessed.    Treatment Conditions:  Lab Results   Component Value Date    HGB 9.9 (L) 03/03/2022    WBC 9.0 03/03/2022    ANEU 5.6 03/03/2022    ANEUTAUTO 3.4 02/07/2022     03/03/2022      Lab Results   Component Value Date     03/03/2022    POTASSIUM 5.6 (H) 03/03/2022    MAG 2.0 03/03/2022    CR 1.06 03/03/2022    COTY 9.3 03/03/2022    BILITOTAL 0.3 03/03/2022    ALBUMIN 3.2 (L) 03/03/2022    ALT 57 03/03/2022    AST 45 03/03/2022     Results reviewed, labs MET treatment parameters, ok to proceed with treatment.      Post Infusion Assessment:  Patient tolerated infusion without incident.  Blood return noted pre and post infusion.  Site patent and intact, free from redness, edema or discomfort.  No evidence of extravasations.  Access discontinued per protocol.       Discharge Plan:   Discharge instructions reviewed with: Patient.  Patient and/or family verbalized understanding of discharge instructions and all questions answered.  Patient discharged in stable condition accompanied by: self.  Departure Mode: Ambulatory.      Sarahi Astudillo RN

## 2022-03-03 NOTE — PROGRESS NOTES
PAC labs drawn and sent. Port accessed without difficulty. Good blood return noted.    Sarahi Astudillo RN

## 2022-03-07 ENCOUNTER — LAB (OUTPATIENT)
Dept: INFUSION THERAPY | Facility: CLINIC | Age: 72
End: 2022-03-07
Attending: PHYSICIAN ASSISTANT
Payer: MEDICARE

## 2022-03-07 DIAGNOSIS — C49.9 SPINDLE CELL SARCOMA (H): ICD-10-CM

## 2022-03-07 DIAGNOSIS — R11.0 CHEMOTHERAPY-INDUCED NAUSEA: ICD-10-CM

## 2022-03-07 DIAGNOSIS — C49.9 SARCOMA (H): Primary | ICD-10-CM

## 2022-03-07 DIAGNOSIS — T45.1X5A CHEMOTHERAPY-INDUCED NAUSEA: ICD-10-CM

## 2022-03-07 LAB
ALBUMIN SERPL-MCNC: 3.1 G/DL (ref 3.4–5)
ALP SERPL-CCNC: 161 U/L (ref 40–150)
ALT SERPL W P-5'-P-CCNC: 36 U/L (ref 0–70)
ANION GAP SERPL CALCULATED.3IONS-SCNC: 5 MMOL/L (ref 3–14)
AST SERPL W P-5'-P-CCNC: 24 U/L (ref 0–45)
BASOPHILS # BLD MANUAL: 0.1 10E3/UL (ref 0–0.2)
BASOPHILS NFR BLD MANUAL: 1 %
BILIRUB SERPL-MCNC: 0.2 MG/DL (ref 0.2–1.3)
BUN SERPL-MCNC: 15 MG/DL (ref 7–30)
CALCIUM SERPL-MCNC: 8.8 MG/DL (ref 8.5–10.1)
CHLORIDE BLD-SCNC: 108 MMOL/L (ref 94–109)
CO2 SERPL-SCNC: 29 MMOL/L (ref 20–32)
CREAT SERPL-MCNC: 1.04 MG/DL (ref 0.66–1.25)
EOSINOPHIL # BLD MANUAL: 0 10E3/UL (ref 0–0.7)
EOSINOPHIL NFR BLD MANUAL: 0 %
ERYTHROCYTE [DISTWIDTH] IN BLOOD BY AUTOMATED COUNT: 17.4 % (ref 10–15)
GFR SERPL CREATININE-BSD FRML MDRD: 76 ML/MIN/1.73M2
GLUCOSE BLD-MCNC: 101 MG/DL (ref 70–99)
HCT VFR BLD AUTO: 30.2 % (ref 40–53)
HGB BLD-MCNC: 9.5 G/DL (ref 13.3–17.7)
LYMPHOCYTES # BLD MANUAL: 2.6 10E3/UL (ref 0.8–5.3)
LYMPHOCYTES NFR BLD MANUAL: 30 %
MAGNESIUM SERPL-MCNC: 2.3 MG/DL (ref 1.6–2.3)
MCH RBC QN AUTO: 31.4 PG (ref 26.5–33)
MCHC RBC AUTO-ENTMCNC: 31.5 G/DL (ref 31.5–36.5)
MCV RBC AUTO: 100 FL (ref 78–100)
MONOCYTES # BLD MANUAL: 1 10E3/UL (ref 0–1.3)
MONOCYTES NFR BLD MANUAL: 12 %
MYELOCYTES # BLD MANUAL: 0.1 10E3/UL
MYELOCYTES NFR BLD MANUAL: 1 %
NEUTROPHILS # BLD MANUAL: 4.8 10E3/UL (ref 1.6–8.3)
NEUTROPHILS NFR BLD MANUAL: 56 %
PHOSPHATE SERPL-MCNC: 2.7 MG/DL (ref 2.5–4.5)
PLAT MORPH BLD: ABNORMAL
PLATELET # BLD AUTO: 223 10E3/UL (ref 150–450)
POTASSIUM BLD-SCNC: 3.5 MMOL/L (ref 3.4–5.3)
PROT SERPL-MCNC: 6.6 G/DL (ref 6.8–8.8)
RBC # BLD AUTO: 3.03 10E6/UL (ref 4.4–5.9)
RBC MORPH BLD: ABNORMAL
SARS-COV-2 RNA RESP QL NAA+PROBE: NEGATIVE
SODIUM SERPL-SCNC: 142 MMOL/L (ref 133–144)
WBC # BLD AUTO: 8.5 10E3/UL (ref 4–11)

## 2022-03-07 PROCEDURE — 84100 ASSAY OF PHOSPHORUS: CPT | Performed by: PHYSICIAN ASSISTANT

## 2022-03-07 PROCEDURE — 36591 DRAW BLOOD OFF VENOUS DEVICE: CPT

## 2022-03-07 PROCEDURE — 258N000003 HC RX IP 258 OP 636: Performed by: PHYSICIAN ASSISTANT

## 2022-03-07 PROCEDURE — 85027 COMPLETE CBC AUTOMATED: CPT | Performed by: PHYSICIAN ASSISTANT

## 2022-03-07 PROCEDURE — 250N000011 HC RX IP 250 OP 636: Performed by: PHYSICIAN ASSISTANT

## 2022-03-07 PROCEDURE — 80053 COMPREHEN METABOLIC PANEL: CPT | Performed by: PHYSICIAN ASSISTANT

## 2022-03-07 PROCEDURE — 83735 ASSAY OF MAGNESIUM: CPT | Performed by: PHYSICIAN ASSISTANT

## 2022-03-07 PROCEDURE — 96360 HYDRATION IV INFUSION INIT: CPT

## 2022-03-07 PROCEDURE — 87635 SARS-COV-2 COVID-19 AMP PRB: CPT

## 2022-03-07 RX ORDER — HEPARIN SODIUM (PORCINE) LOCK FLUSH IV SOLN 100 UNIT/ML 100 UNIT/ML
5 SOLUTION INTRAVENOUS
Status: COMPLETED | OUTPATIENT
Start: 2022-03-07 | End: 2022-03-07

## 2022-03-07 RX ORDER — HEPARIN SODIUM (PORCINE) LOCK FLUSH IV SOLN 100 UNIT/ML 100 UNIT/ML
5 SOLUTION INTRAVENOUS
Status: CANCELLED
Start: 2022-03-07

## 2022-03-07 RX ADMIN — Medication 5 ML: at 12:28

## 2022-03-07 RX ADMIN — SODIUM CHLORIDE 1000 ML: 9 INJECTION, SOLUTION INTRAVENOUS at 11:26

## 2022-03-07 NOTE — PROGRESS NOTES
PAC labs drawn via site protocol without difficulty. COVID swab collected for upcoming admission. Patient tolerated well.    Suzette Riley RN on 3/7/2022 at 10:31 AM

## 2022-03-07 NOTE — PROGRESS NOTES
Ifrah is a 71 year old who is being evaluated via a billable video visit.      How would you like to obtain your AVS? MyChart  If the video visit is dropped, the invitation should be resent by: Send to e-mail at: [email or web address]  Will anyone else be joining your video visit? Emma     Amanda Morgan VF        Video Start Time: 1:29pm    Video-Visit Details    Type of service:  Video Visit    Video End Time:1:56pm    Originating Location (pt. Location): Home    Distant Location (provider location):  [facility]     Platform used for Video Visit: [facility]    Oncology/Hematology Visit Note  Mar 9, 2022    Reason for Visit: Follow up of spindle cell sarcoma     History of Present Illness: Ifrah Huitron is a 71 year old male with PMH rosacea, pituitary macroadenoma s/p resection, GERD, kidney stones, DJD with metastatic spindle cell sarcoma. He presented to his PCP March 2021 with chest pain/left shoulder and back pain that led to imaging which revealed multiple lung mets. There were difficulties in getting diagnostic biopsy, eventually biopsy of mediastinal mass with spindle cell sarcoma. There was high PD-L1 expression (90%). Baseline imaging 6/21/21 with progression of lung mets and left-sided subdiaphragmatic mass, stable liver lesions. 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      He started Keytruda 6/21/21. CT 8/2/21 with positive response to treatment. CT 10/18/21 with mixed response- LUQ mass larger, anterior mediastinal and left anterior pleural mass smaller. Keytruda stopped.     Started on Doxil + Ifosfamide 10/26/21. Poorly tolerated with mucositis, nausea, poor oral intake. CT with stable to positive response.      Received C2 12/1/21 (delayed for tolerance issues) with 10% dose reduction.     Received C3 1/4/22 with same 10% dose reduction. He had issues with nausea inpatient along with  "recurrent thrush.      Received C4 2/2/22 with same 10% dose reduction. Had more significant neurotoxicity so only received 5.5 days. Symptoms resolved after stopping Ifosfamide.     CT 3/8/22 with positive response to treatment.     Interval History:  Ifrah was called today for follow-up. He is feeling really well. Lots of energy, eating well, exercising. Had mild constipation but better now. No mouth issues. No nausea. No respiratory concerns other than occasional cough. His skin sores are healed and his hands/feet are still dry but no pain and still very functional. Nails are splitting. Has stable numbness in feet. No cancer related pain. All in all he feels really well.     Current Outpatient Medications   Medication Sig Dispense Refill     acetaminophen (TYLENOL) 500 MG tablet Take 500-1,000 mg by mouth every 6 hours as needed for mild pain       calcium carbonate (TUMS) 500 MG chewable tablet Take 1 tablet (500 mg) by mouth 3 times daily as needed for heartburn       cholecalciferol (VITAMIN D-1000 MAX ST) 1000 units TABS Take 1,000 Units by mouth daily        doxepin (SINEQUAN) 10 MG/ML (HIGH CONC) solution Take 2 mLs (20 mg) by mouth every 4 hours as needed for other (mouth pain) . Mix with equal parts tap water. Swish and hold in mouth ~60 seconds then spit out. 118 mL 3     escitalopram (LEXAPRO) 10 MG tablet Take 1 tablet (10 mg) by mouth daily 30 tablet 1     guaiFENesin-codeine (GUAIFENESIN AC) 100-10 MG/5ML syrup Take 10 mLs by mouth every 4 hours as needed for cough 118 mL 0     hydrocortisone (CORTEF) 10 MG tablet Take 20 mg in the morning and 10 mg in the afternoon 270 tablet 3     Insulin Syringe-Needle U-100 27.5G X 5/8\" 2 ML MISC 1 each daily as needed (with solucortef for adrenal crisis) 10 each 1     levothyroxine (SYNTHROID/LEVOTHROID) 75 MCG tablet Take 1 tablet (75 mcg) by mouth every morning 90 tablet 3     LORazepam (ATIVAN) 0.5 MG tablet Take 1 tablet (0.5 mg) by mouth every 4 hours as " needed for nausea or vomiting . Caution: causes sedation. 30 tablet 0     Menthol-Methyl Salicylate (DALIA MALIK GREASELESS) cream Apply topically every 6 hours as needed       metroNIDAZOLE (METROGEL) 1 % external gel Apply topically daily .Try stronger dose due to increased symptoms after chemo. 30 g 0     nystatin (MYCOSTATIN) 491563 UNIT/ML suspension Take 5 mLs (500,000 Units) by mouth 4 times daily Hold while taking Fluconazole, could resume after Fluconazole if you have ongoing coating on tongue. 473 mL 0     OLANZapine (ZYPREXA) 5 MG tablet Take 1 tablet (5 mg) by mouth At Bedtime Continue until your nausea resolves 30 tablet 1     omeprazole (PRILOSEC) 20 MG DR capsule Take 2 capsules (40 mg) by mouth daily 60 capsule 1     ondansetron (ZOFRAN) 4 MG tablet Take 1-2 tablets (4-8 mg) by mouth every 8 hours as needed for nausea 60 tablet 3     prochlorperazine (COMPAZINE) 5 MG tablet Take 1 tablet (5 mg) by mouth every 6 hours as needed for nausea or vomiting . Caution: causes sedation. 30 tablet 1     SUMAtriptan (IMITREX) 50 MG tablet Take 1 tablet (50 mg) by mouth at onset of headache for migraine May repeat in 2 hours. Max 4 tablets/24 hours. 10 tablet 3     triamcinolone (KENALOG) 0.1 % external cream Apply topically 2 times daily to bothersome skin areas. 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     Thyroid disease     removed pituitary gland     Past Surgical History:   Procedure Laterality Date     COLONOSCOPY N/A 12/17/2020    Procedure: COLONOSCOPY;  Surgeon: Ken Camacho MD;  Location: [facility]     ENT SURGERY       HERNIA REPAIR       INSERT PORT VASCULAR ACCESS Right 1/28/2022    Procedure: INSERTION, VASCULAR ACCESS PORT;  Surgeon: Daniel Kinney MD;  [place] [facility] [place] [facility]     IR CHEST PORT PLACEMENT > 5 YRS OF AGE  1/28/2022     LARYNGOSCOPY, EXCISE VOCAL CORD LESION  MICROSCOPIC, COMBINED Left 07/01/2021    Procedure: MICROLARYNGOSCOPY, LEFT TRUE VOCAL CORD INJECTION WITH PROLARYN;  Surgeon: Kyree Bearden MD;  Location: [facility]     PHACOEMULSIFICATION WITH STANDARD INTRAOCULAR LENS IMPLANT Right 03/10/2021    Procedure: Cataract removal with implant.;  Surgeon: Jamir Mac MD;  Location: [facility]     PHACOEMULSIFICATION WITH STANDARD INTRAOCULAR LENS IMPLANT Left 04/05/2021    Procedure: Cataract removal with implant.;  Surgeon: Jamir Mac MD;  Location: [facility]     PICC DOUBLE LUMEN PLACEMENT Right 12/01/2021    5FR DL PICC, basilic vein. L-38cm, 1cm out.     PICC DOUBLE LUMEN PLACEMENT Right 01/04/2022    Right cephalic, 41 cm, 1 external length     PITUITARY EXCISION       tooth pulled 4/7  Right      Allergies   Allergen Reactions     Penicillins Hives and Swelling            Social History   Social History     Tobacco Use     Smoking status: Never Smoker     Smokeless tobacco: Never Used   Substance Use Topics     Alcohol use: Yes     Comment: rare     Drug use: Never      Past medical history and social history were reviewed.    Physical Examination:  There were no vitals taken for this visit.  Wt Readings from Last 10 Encounters:   02/08/22 63 kg (138 lb 14.4 oz)   01/28/22 65.8 kg (145 lb)   01/11/22 65.1 kg (143 lb 8 oz)   12/09/21 66.7 kg (147 lb)   12/09/21 67 kg (147 lb 11.2 oz)   12/08/21 64.8 kg (142 lb 13.7 oz)   11/12/21 68 kg (150 lb)   11/11/21 69.9 kg (154 lb)   11/04/21 70 kg (154 lb 6.4 oz)   11/02/21 85.9 kg (189 lb 6.4 oz)     Video physical exam  General: Patient appears well in no acute distress.   Skin: No visualized rash or lesions on visualized skin  Eyes: EOMI, no erythema, sclera icterus or discharge noted  Resp: Appears to be breathing comfortably without accessory muscle usage, speaking in full sentences, no cough  MSK: Appears to have normal range of motion based on visualized movements  Neurologic: No apparent tremors,  facial movements symmetric  Psych: affect normal, alert and oriented    The rest of a comprehensive physical examination is deferred due to PHE (public health emergency) video restrictions    Laboratory Data:  Results for MARISOL XIE (MRN [id number]) as of 3/9/2022 14:04   3/7/2022 10:19   Sodium 142   Potassium 3.5   Chloride 108   Carbon Dioxide 29   Urea Nitrogen 15   Creatinine 1.04   GFR Estimate 76   Calcium 8.8   Anion Gap 5   Magnesium 2.3   Phosphorus 2.7   Albumin 3.1 (L)   Protein Total 6.6 (L)   Bilirubin Total 0.2   Alkaline Phosphatase 161 (H)   ALT 36   AST 24   Glucose 101 (H)   WBC 8.5   Hemoglobin 9.5 (L)   Hematocrit 30.2 (L)   Platelet Count 223   RBC Count 3.03 (L)      MCH 31.4   MCHC 31.5   RDW 17.4 (H)   % Neutrophils 56   % Lymphocytes 30   % Monocytes 12   % Eosinophils 0   % Basophils 1   % Myelocytes 1   Absolute Basophils 0.1   Absolute Neutrophil 4.8   Absolute Lymphocytes 2.6   Absolute Monocytes 1.0   Absolute Eosinophils 0.0   Absolute Myelocytes 0.1 (H)   RBC Morphology Confirmed RBC Indices   Platelet Morphology Automated Count Confirmed. Platelet morphology is normal.     CT CAP 3/8/22  FINDINGS:   LUNGS AND PLEURA: Stable 3 mm nodule in the lateral left lower lobe on  image 181 of series 4.     MEDIASTINUM/AXILLAE: Right chest port is present. Irregular enhancing  soft tissue in the anterior mediastinum is again noted. The superior  aspect does not appear changed measuring roughly 25 x 21 mm on image  22 of series 2. The inferior component has decreased since previous  exam and measures up to 2.8 cm on image 25 compared to 3.3 cm  previously.     HEPATOBILIARY: Multiple small cysts are seen through the liver. The  portal vein is patent.     PANCREAS: Normal.     SPLEEN: The irregular soft tissue mass invading the posterior aspect  of the spleen and sitting just below the left hemidiaphragm has  decreased slightly in size and measures 6.8 x 5.1 cm compared to 7.7 x  5.8  cm.     ADRENAL GLANDS: Normal.     KIDNEYS/BLADDER: Multiple small cysts are seen through the kidneys. No  follow-up necessary for these.     BOWEL: Sigmoid diverticulosis is noted without evidence of  diverticulitis.     PELVIC ORGANS: The prostate is moderately enlarged and indents the  underside of the bladder. No free fluid.     ADDITIONAL FINDINGS: None.     MUSCULOSKELETAL: Degenerative changes are noted through the spine.                                                                      IMPRESSION:  1.  Slight decrease in size is noted in the inferior component of the  anterior mediastinal soft tissue mass. The superior component appears  unchanged.  2.  Interval mild decrease in size of the lobulated subdiaphragmatic  mass invading the posterior aspect of the spleen.  3.  Stable 3 mm left lower lobe nodule.     OPAL SINGELTON MD     Assessment and Plan:  1. 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 High PD-L1. Was on Keytruda but had progression, now on Doxil + Ifos started 10/26/21. Port placed 1/28/22.    He has now completed 4 cycles of Doxil + Ifos, has had multiple tolerance issues including nausea/vomiting, dehydration, neurotoxicity, mucositis, skin toxicity, pancytopenia, hypokalemia, hypophosphatemia. Currently he feels well and is recovered from last cycle.    CT from 3/8/22 reviewed with Dr. Sprague and patient with ongoing positive response to treatment. Given improvement in disease and toxicity issues Dr. Wolff recommended switching to Doxil alone.    Will start Doxil 70mg (keep same dose as I expect improved tolerance when given alone) tomorrow. Plan to do every 28 days. Do 2-3 cycles and then repeat CT scan.     Will keep weekly labs and IVF this month but anticipate he will tolerate much better and likely won't need moving forward.      2. GI  Dyspepsia: Continue Omeprazole 40mg daily. Continue Tums PRN     CINV: Should not have issues  with Doxil alone. Stop Zyprexa. Can use Zofran PRN.     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 Cal Saba endocrine.     Hypothyroidism continue Synthroid 75mcg daily.       4. Derm  Rosacea: Long standing issue, continue Metrogel PRN     Hand/foot: 2/2 Doxil, icing hands and feet. Continue emollients at home. Kenalog cream for bothersome/blistering and red areas. Skin improving and no functional limitations.       5. MSK  Left shoulder/back/chest wall pain 2/2 tumor, following with palliative. No pain currently, off all pain medications.       6. ENT  Hoarseness: Thought 2/2 mediastinal mass vs irritation from prior biopsy. Following with ENT, s/p vocal cord infection 7/1/21. Following with voice clinic. This is improved.     Mucositis/Thrush: Now resolved. Will ice mouth during Doxil infusion and continue salt/soda swishes. Will have him try Healios.     7. Pulm/Cards  Dyspnea with talking, prior work-up with CT PE negative for PE, infection, pneumonitis; troponin negative; COVID negative. Sating well on RA. Agree with speech pathology thoughts on laryngeal dysfunction. Improving.      Continue Guaifenesin-Coedine PRN for cough, much improved.     Off statin due to LFT elevation, improved.      8. FEN  Hypokalemia: Resolved, off supplement. Shouldn't have any issues with Doxil alone.      Hypophosphatemia: Resolved, off supplement. Shouldn't have any issues with Doxil alone.      Hypomag: Resolved.      Poor PO intake: 2/2 mucositis and nausea as above. Should be better on Doxil alone.      Dehydration: Will schedule weekly IVF though discussed he likely won't need them and can cancel if he is doing well.      9. Psych/Neuro  Anxiety/depression: Hx of issues, recurrent issue when he was feeling poorly from treatment. Started on Lexapro by inpatient team which has helped. Mood much improved. Likely taper off Lexapro in the upcoming months.      Continue PRN Imitrex for  migraines.      Had neuro toxicity inpatient with cycle 4 (monoclonic jerking, syncope, halluncinations), now resolved. Stopping Ifos.      10. Heme  Pancytopenia 2/2 chemotherapy. Improving. Should not have issues with Doxil alone.     50 minutes spent on the date of the encounter doing chart review, review of test results, interpretation of tests, patient visit, documentation and discussion with other provider(s)     Maynor Rios PA-C  Department of Hematology and Oncology  [facility]

## 2022-03-07 NOTE — PROGRESS NOTES
Infusion Nursing Note:  Ifrah Huitron presents today for IVF.    Patient seen by provider today: No   present during visit today: Not Applicable.    Note: N/A.      Intravenous Access:  Implanted Port.    Treatment Conditions:  Lab Results   Component Value Date     03/07/2022    POTASSIUM 3.5 03/07/2022    MAG 2.3 03/07/2022    CR 1.04 03/07/2022    COTY 8.8 03/07/2022    BILITOTAL 0.2 03/07/2022    ALBUMIN 3.1 (L) 03/07/2022    ALT 36 03/07/2022    AST 24 03/07/2022     Results reviewed, labs MET treatment parameters, ok to proceed with treatment.      Post Infusion Assessment:  Patient tolerated infusion without incident.  Blood return noted pre and post infusion.  Site patent and intact, free from redness, edema or discomfort.  No evidence of extravasations.  Access discontinued per protocol.       Discharge Plan:   Copy of AVS reviewed with patient and/or family.  Patient will return 3/10/22 for next appointment.  Patient discharged in stable condition accompanied by: self.  Departure Mode: Ambulatory.      Suzette Riley RN

## 2022-03-08 ENCOUNTER — HOSPITAL ENCOUNTER (OUTPATIENT)
Dept: CT IMAGING | Facility: CLINIC | Age: 72
Discharge: HOME | End: 2022-03-08
Attending: PHYSICIAN ASSISTANT | Admitting: PHYSICIAN ASSISTANT
Payer: MEDICARE

## 2022-03-08 DIAGNOSIS — T45.1X5A CHEMOTHERAPY-INDUCED NEUTROPENIA (H): ICD-10-CM

## 2022-03-08 DIAGNOSIS — D70.1 CHEMOTHERAPY-INDUCED NEUTROPENIA (H): ICD-10-CM

## 2022-03-08 DIAGNOSIS — C49.9 SPINDLE CELL SARCOMA (H): ICD-10-CM

## 2022-03-08 PROCEDURE — 74177 CT ABD & PELVIS W/CONTRAST: CPT | Mod: MG

## 2022-03-08 PROCEDURE — 250N000011 HC RX IP 250 OP 636: Performed by: PHYSICIAN ASSISTANT

## 2022-03-08 PROCEDURE — 250N000011 HC RX IP 250 OP 636: Performed by: RADIOLOGY

## 2022-03-08 PROCEDURE — 250N000009 HC RX 250: Performed by: RADIOLOGY

## 2022-03-08 RX ORDER — HEPARIN SODIUM (PORCINE) LOCK FLUSH IV SOLN 100 UNIT/ML 100 UNIT/ML
500 SOLUTION INTRAVENOUS ONCE
Status: COMPLETED | OUTPATIENT
Start: 2022-03-08 | End: 2022-03-08

## 2022-03-08 RX ORDER — IOPAMIDOL 755 MG/ML
68 INJECTION, SOLUTION INTRAVASCULAR ONCE
Status: COMPLETED | OUTPATIENT
Start: 2022-03-08 | End: 2022-03-08

## 2022-03-08 RX ADMIN — IOPAMIDOL 68 ML: 755 INJECTION, SOLUTION INTRAVENOUS at 11:07

## 2022-03-08 RX ADMIN — SODIUM CHLORIDE 57 ML: 9 INJECTION, SOLUTION INTRAVENOUS at 11:07

## 2022-03-08 RX ADMIN — Medication 500 UNITS: at 11:19

## 2022-03-09 ENCOUNTER — VIRTUAL VISIT (OUTPATIENT)
Dept: ONCOLOGY | Facility: CLINIC | Age: 72
End: 2022-03-09
Attending: PHYSICIAN ASSISTANT
Payer: MEDICARE

## 2022-03-09 ENCOUNTER — VIRTUAL VISIT (OUTPATIENT)
Dept: PALLIATIVE CARE | Facility: CLINIC | Age: 72
End: 2022-03-09
Attending: INTERNAL MEDICINE
Payer: MEDICARE

## 2022-03-09 DIAGNOSIS — J38.01 VOCAL FOLD PARALYSIS, LEFT: ICD-10-CM

## 2022-03-09 DIAGNOSIS — C45.9 MESOTHELIOMA, MALIGNANT (H): ICD-10-CM

## 2022-03-09 DIAGNOSIS — C49.9 SPINDLE CELL SARCOMA (H): Primary | ICD-10-CM

## 2022-03-09 DIAGNOSIS — R05.9 COUGH: ICD-10-CM

## 2022-03-09 DIAGNOSIS — R49.0 DYSPHONIA: ICD-10-CM

## 2022-03-09 DIAGNOSIS — R49.0 MUSCLE TENSION DYSPHONIA: ICD-10-CM

## 2022-03-09 PROCEDURE — G0463 HOSPITAL OUTPT CLINIC VISIT: HCPCS | Mod: PN,RTG | Performed by: PHYSICIAN ASSISTANT

## 2022-03-09 PROCEDURE — 99213 OFFICE O/P EST LOW 20 MIN: CPT | Mod: 95 | Performed by: INTERNAL MEDICINE

## 2022-03-09 PROCEDURE — 99215 OFFICE O/P EST HI 40 MIN: CPT | Mod: 95 | Performed by: PHYSICIAN ASSISTANT

## 2022-03-09 RX ORDER — HEPARIN SODIUM,PORCINE 10 UNIT/ML
5 VIAL (ML) INTRAVENOUS
Status: CANCELLED | OUTPATIENT
Start: 2022-03-10

## 2022-03-09 RX ORDER — MEPERIDINE HYDROCHLORIDE 25 MG/ML
25 INJECTION INTRAMUSCULAR; INTRAVENOUS; SUBCUTANEOUS EVERY 30 MIN PRN
Status: CANCELLED | OUTPATIENT
Start: 2022-03-10

## 2022-03-09 RX ORDER — EPINEPHRINE 1 MG/ML
0.3 INJECTION, SOLUTION INTRAMUSCULAR; SUBCUTANEOUS EVERY 5 MIN PRN
Status: CANCELLED | OUTPATIENT
Start: 2022-03-10

## 2022-03-09 RX ORDER — ALBUTEROL SULFATE 0.83 MG/ML
2.5 SOLUTION RESPIRATORY (INHALATION)
Status: CANCELLED | OUTPATIENT
Start: 2022-03-10

## 2022-03-09 RX ORDER — DIPHENHYDRAMINE HYDROCHLORIDE 50 MG/ML
50 INJECTION INTRAMUSCULAR; INTRAVENOUS
Status: CANCELLED | OUTPATIENT
Start: 2022-03-10

## 2022-03-09 RX ORDER — CODEINE PHOSPHATE/GUAIFENESIN 10-100MG/5
10 LIQUID (ML) ORAL EVERY 4 HOURS PRN
Qty: 118 ML | Refills: 0 | Status: SHIPPED | OUTPATIENT
Start: 2022-03-09 | End: 2022-01-01

## 2022-03-09 RX ORDER — METHYLPREDNISOLONE SODIUM SUCCINATE 125 MG/2ML
125 INJECTION, POWDER, LYOPHILIZED, FOR SOLUTION INTRAMUSCULAR; INTRAVENOUS
Status: CANCELLED | OUTPATIENT
Start: 2022-03-10

## 2022-03-09 RX ORDER — HEPARIN SODIUM (PORCINE) LOCK FLUSH IV SOLN 100 UNIT/ML 100 UNIT/ML
5 SOLUTION INTRAVENOUS
Status: CANCELLED | OUTPATIENT
Start: 2022-03-10

## 2022-03-09 RX ORDER — NALOXONE HYDROCHLORIDE 0.4 MG/ML
0.2 INJECTION, SOLUTION INTRAMUSCULAR; INTRAVENOUS; SUBCUTANEOUS
Status: CANCELLED | OUTPATIENT
Start: 2022-03-10

## 2022-03-09 RX ORDER — ALBUTEROL SULFATE 90 UG/1
1-2 AEROSOL, METERED RESPIRATORY (INHALATION)
Status: CANCELLED | OUTPATIENT
Start: 2022-03-10

## 2022-03-09 NOTE — PROGRESS NOTES
"Ifrah is a 71 year old who is being evaluated via a billable video visit.       How would you like to obtain your AVS? MyChart  If the video visit is dropped, the invitation should be resent by: Text to cell phone: [phone]  Will anyone else be joining your video visit? 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 Pembro started summer 2021.  8/2021 scan shows response to pembro.  10/2021 progression-->doxil+ifosfamide complicated by mucositis, nausea, thrush, need for dose reduction  12/2021 scans show stable disease; continue doxil/ifos, has needed dose reductions for tolerability  2/2022 scans with slight reduction in tumor size, continue dose reduced doxil/ifos    Course complicated by dysphonia, working with SLP summer 2021.Vocal cord dysfunction, follows ENT.  Has surgical hypopituitarism     Social - Lives with wife Abida; 5 adult kids, 19 grandkids. Ran a golf course.      Care Planning - ACP discussion 6/2021 palliative visit.       History:  History gathered today from: patient, medical chart    His previous severe pain has at this point nearly totally resolved.  He has stopped celecoxib, hardly ever takes acetaminophen even.  He's delighted by this.   Exercising: pushups, situps, trying to get his strength back; does this without sig pain!  Sleeping well.    Continues to have sig problems with chemo and has needed dose reductions with many cycles. However this last time he feels he's had a full recovery: \"I feel like a human being again.\" Mucositis and thrush have been a challenge but not now.    Mild constipation he attributes to dietary changes, protein shakes. Having daily stools.    PE: There were no vitals taken for this visit.   Wt Readings from Last 3 Encounters:   02/08/22 63 kg (138 lb 14.4 oz)   01/28/22 65.8 kg (145 lb)   01/11/22 65.1 kg (143 lb 8 oz)     Alert NAD  Clear sensorium " full affect  Healthy appearing      Data reviewed:  I reviewed recent labs and imaging, my comments:  Cr 1  Alb 3.1    CT  IMPRESSION:  1.  Slight decrease in size is noted in the inferior component of the  anterior mediastinal soft tissue mass. The superior component appears  unchanged.  2.  Interval mild decrease in size of the lobulated subdiaphragmatic  mass invading the posterior aspect of the spleen.  3.  Stable 3 mm left lower lobe nodule.     database reviewed: y      Impression & Recommendations:  72 yo with metastatic sarcoma  Previous intractable pain now resolved  Getting ongoing superb supportive care from his med onc team.  Mood and spirits good--no changes today    We discussed follow-up in 3 months or prn; he chose prn; happy to see him anytime we can be of help    25 minutes spent on the date of the encounter doing chart review, history and exam, patient education & counseling, documentation and other activities as noted above.    Thank you for involving us in the patient's care.   Sal Handy MD / Palliative Medicine / Pam me via [employer].      Video Start Time: 1020  Video-Visit Details    Type of service:  Video Visit    Video End Time:10:32 AM    Originating Location (pt. Location): Home    Distant Location (provider location): Home    Platform used for Video Visit: Thomas Robledo

## 2022-03-09 NOTE — LETTER
"3/9/2022       RE: Ifrah Huitron  [address] [facility] [facility] [address]     Dear Colleague,    Thank you for referring your patient, Ifrah Huitron[facility]. Please see a copy of my visit note below.    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 Pembro started summer 2021.  8/2021 scan shows response to pembro.  10/2021 progression-->doxil+ifosfamide complicated by mucositis, nausea, thrush, need for dose reduction  12/2021 scans show stable disease; continue doxil/ifos, has needed dose reductions for tolerability  2/2022 scans with slight reduction in tumor size, continue dose reduced doxil/ifos    Course complicated by dysphonia, working with SLP summer 2021.Vocal cord dysfunction, follows ENT.  Has surgical hypopituitarism     Social - Lives with wife Abida; 5 adult kids, 19 grandkids. Ran a golf course.      Care Planning - ACP discussion 6/2021 palliative visit.       History:  History gathered today from: patient, medical chart    His previous severe pain has at this point nearly totally resolved.  He has stopped celecoxib, hardly ever takes acetaminophen even.  He's delighted by this.   Exercising: pushups, situps, trying to get his strength back; does this without sig pain!  Sleeping well.    Continues to have sig problems with chemo and has needed dose reductions with many cycles. However this last time he feels he's had a full recovery: \"I feel like a human being again.\" Mucositis and thrush have been a challenge but not now.    Mild constipation he attributes to dietary changes, protein shakes. Having daily stools.    PE: There were no vitals taken for this visit.   Wt Readings from Last 3 Encounters:   02/08/22 63 kg (138 lb 14.4 oz)   01/28/22 65.8 kg (145 lb)   01/11/22 65.1 kg (143 lb 8 oz)     Alert " NAD  Clear sensorium full affect  Healthy appearing      Data reviewed:  I reviewed recent labs and imaging, my comments:  Cr 1  Alb 3.1    CT  IMPRESSION:  1.  Slight decrease in size is noted in the inferior component of the  anterior mediastinal soft tissue mass. The superior component appears  unchanged.  2.  Interval mild decrease in size of the lobulated subdiaphragmatic  mass invading the posterior aspect of the spleen.  3.  Stable 3 mm left lower lobe nodule.     database reviewed: y      Impression & Recommendations:  70 yo with metastatic sarcoma  Previous intractable pain now resolved  Getting ongoing superb supportive care from his med onc team.  Mood and spirits good--no changes today    We discussed follow-up in 3 months or prn; he chose prn; happy to see him anytime we can be of help    25 minutes spent on the date of the encounter doing chart review, history and exam, patient education & counseling, documentation and other activities as noted above.      Thank you for involving us in the patient's care.   Sal Handy MD / Palliative Medicine / Pam bassett via [facility].

## 2022-03-09 NOTE — LETTER
3/9/2022         RE: Ifrah Huitron  [id number] [address] [facility] [address]        Dear Colleague,    Thank you for referring your patient, Ifrah Huitron, to the [facility]. Please see a copy of my visit note below.    Ifrah is a 71 year old who is being evaluated via a billable video visit.      How would you like to obtain your AVS? MyChart  If the video visit is dropped, the invitation should be resent by: Send to e-mail at: [email or web address]  Will anyone else be joining your video visit? No     Amandagokul Hicksaraceli VF        Video Start Time: 1:29pm    Video-Visit Details    Type of service:  Video Visit    Video End Time:1:56pm    Originating Location (pt. Location): Home    Distant Location (provider location):  [facility]     Platform used for Video Visit: Velocix    Oncology/Hematology Visit Note  Mar 9, 2022    Reason for Visit: Follow up of spindle cell sarcoma     History of Present Illness: Ifrah Huitron is a 71 year old male with PMH rosacea, pituitary macroadenoma s/p resection, GERD, kidney stones, DJD with metastatic spindle cell sarcoma. He presented to his PCP March 2021 with chest pain/left shoulder and back pain that led to imaging which revealed multiple lung mets. There were difficulties in getting diagnostic biopsy, eventually biopsy of mediastinal mass with spindle cell sarcoma. There was high PD-L1 expression (90%). Baseline imaging 6/21/21 with progression of lung mets and left-sided subdiaphragmatic mass, stable liver lesions. 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      He started Keytruda 6/21/21. CT 8/2/21 with positive response to treatment. CT 10/18/21 with mixed response- LUQ mass larger, anterior mediastinal and left anterior pleural mass smaller. Keytruda stopped.     Started on Doxil + Ifosfamide 10/26/21. Poorly tolerated with mucositis,  "nausea, poor oral intake. CT with stable to positive response.      Received C2 12/1/21 (delayed for tolerance issues) with 10% dose reduction.     Received C3 1/4/22 with same 10% dose reduction. He had issues with nausea inpatient along with recurrent thrush.      Received C4 2/2/22 with same 10% dose reduction. Had more significant neurotoxicity so only received 5.5 days. Symptoms resolved after stopping Ifosfamide.     CT 3/8/22 with positive response to treatment.     Interval History:  Ifrah was called today for follow-up. He is feeling really well. Lots of energy, eating well, exercising. Had mild constipation but better now. No mouth issues. No nausea. No respiratory concerns other than occasional cough. His skin sores are healed and his hands/feet are still dry but no pain and still very functional. Nails are splitting. Has stable numbness in feet. No cancer related pain. All in all he feels really well.     Current Outpatient Medications   Medication Sig Dispense Refill     acetaminophen (TYLENOL) 500 MG tablet Take 500-1,000 mg by mouth every 6 hours as needed for mild pain       calcium carbonate (TUMS) 500 MG chewable tablet Take 1 tablet (500 mg) by mouth 3 times daily as needed for heartburn       cholecalciferol (VITAMIN D-1000 MAX ST) 1000 units TABS Take 1,000 Units by mouth daily        doxepin (SINEQUAN) 10 MG/ML (HIGH CONC) solution Take 2 mLs (20 mg) by mouth every 4 hours as needed for other (mouth pain) . Mix with equal parts tap water. Swish and hold in mouth ~60 seconds then spit out. 118 mL 3     escitalopram (LEXAPRO) 10 MG tablet Take 1 tablet (10 mg) by mouth daily 30 tablet 1     guaiFENesin-codeine (GUAIFENESIN AC) 100-10 MG/5ML syrup Take 10 mLs by mouth every 4 hours as needed for cough 118 mL 0     hydrocortisone (CORTEF) 10 MG tablet Take 20 mg in the morning and 10 mg in the afternoon 270 tablet 3     Insulin Syringe-Needle U-100 27.5G X 5/8\" 2 ML MISC 1 each daily as needed (with " solucortef for adrenal crisis) 10 each 1     levothyroxine (SYNTHROID/LEVOTHROID) 75 MCG tablet Take 1 tablet (75 mcg) by mouth every morning 90 tablet 3     LORazepam (ATIVAN) 0.5 MG tablet Take 1 tablet (0.5 mg) by mouth every 4 hours as needed for nausea or vomiting . Caution: causes sedation. 30 tablet 0     Menthol-Methyl Salicylate (DALIA MALIK GREASELESS) cream Apply topically every 6 hours as needed       metroNIDAZOLE (METROGEL) 1 % external gel Apply topically daily .Try stronger dose due to increased symptoms after chemo. 30 g 0     nystatin (MYCOSTATIN) 786783 UNIT/ML suspension Take 5 mLs (500,000 Units) by mouth 4 times daily Hold while taking Fluconazole, could resume after Fluconazole if you have ongoing coating on tongue. 473 mL 0     OLANZapine (ZYPREXA) 5 MG tablet Take 1 tablet (5 mg) by mouth At Bedtime Continue until your nausea resolves 30 tablet 1     omeprazole (PRILOSEC) 20 MG DR capsule Take 2 capsules (40 mg) by mouth daily 60 capsule 1     ondansetron (ZOFRAN) 4 MG tablet Take 1-2 tablets (4-8 mg) by mouth every 8 hours as needed for nausea 60 tablet 3     prochlorperazine (COMPAZINE) 5 MG tablet Take 1 tablet (5 mg) by mouth every 6 hours as needed for nausea or vomiting . Caution: causes sedation. 30 tablet 1     SUMAtriptan (IMITREX) 50 MG tablet Take 1 tablet (50 mg) by mouth at onset of headache for migraine May repeat in 2 hours. Max 4 tablets/24 hours. 10 tablet 3     triamcinolone (KENALOG) 0.1 % external cream Apply topically 2 times daily to bothersome skin areas. 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     Thyroid disease     removed pituitary gland     Past Surgical History:   Procedure Laterality Date     COLONOSCOPY N/A 12/17/2020    Procedure: COLONOSCOPY;  Surgeon: Ken Camacho MD;  Location: [facility]     ENT SURGERY       HERNIA REPAIR       INSERT  PORT VASCULAR ACCESS Right 1/28/2022    Procedure: INSERTION, VASCULAR ACCESS PORT;  Surgeon: Daniel Kinney MD;  Location: [facility] OR     IR CHEST PORT PLACEMENT > 5 YRS OF AGE  1/28/2022     LARYNGOSCOPY, EXCISE VOCAL CORD LESION MICROSCOPIC, COMBINED Left 07/01/2021    Procedure: MICROLARYNGOSCOPY, LEFT TRUE VOCAL CORD INJECTION WITH PROLARYN;  Surgeon: Kyree Bearden MD;  Location: [facility]     PHACOEMULSIFICATION WITH STANDARD INTRAOCULAR LENS IMPLANT Right 03/10/2021    Procedure: Cataract removal with implant.;  Surgeon: Jamir Mac MD;  Location: [facility]     PHACOEMULSIFICATION WITH STANDARD INTRAOCULAR LENS IMPLANT Left 04/05/2021    Procedure: Cataract removal with implant.;  Surgeon: Jamir Mac MD;  Location: [facility]     PICC DOUBLE LUMEN PLACEMENT Right 12/01/2021    5FR DL PICC, basilic vein. L-38cm, 1cm out.     PICC DOUBLE LUMEN PLACEMENT Right 01/04/2022    Right cephalic, 41 cm, 1 external length     PITUITARY EXCISION       tooth pulled 4/7  Right      Allergies   Allergen Reactions     Penicillins Hives and Swelling            Social History   Social History     Tobacco Use     Smoking status: Never Smoker     Smokeless tobacco: Never Used   Substance Use Topics     Alcohol use: Yes     Comment: rare     Drug use: Never      Past medical history and social history were reviewed.    Physical Examination:  There were no vitals taken for this visit.  Wt Readings from Last 10 Encounters:   02/08/22 63 kg (138 lb 14.4 oz)   01/28/22 65.8 kg (145 lb)   01/11/22 65.1 kg (143 lb 8 oz)   12/09/21 66.7 kg (147 lb)   12/09/21 67 kg (147 lb 11.2 oz)   12/08/21 64.8 kg (142 lb 13.7 oz)   11/12/21 68 kg (150 lb)   11/11/21 69.9 kg (154 lb)   11/04/21 70 kg (154 lb 6.4 oz)   11/02/21 85.9 kg (189 lb 6.4 oz)     Video physical exam  General: Patient appears well in no acute distress.   Skin: No visualized rash or lesions on visualized skin  Eyes: EOMI, no erythema, sclera icterus or  discharge noted  Resp: Appears to be breathing comfortably without accessory muscle usage, speaking in full sentences, no cough  MSK: Appears to have normal range of motion based on visualized movements  Neurologic: No apparent tremors, facial movements symmetric  Psych: affect normal, alert and oriented    The rest of a comprehensive physical examination is deferred due to PHE (public health emergency) video restrictions    Laboratory Data:  Results for MARISOL XIE (MRN [id number]) as of 3/9/2022 14:04   3/7/2022 10:19   Sodium 142   Potassium 3.5   Chloride 108   Carbon Dioxide 29   Urea Nitrogen 15   Creatinine 1.04   GFR Estimate 76   Calcium 8.8   Anion Gap 5   Magnesium 2.3   Phosphorus 2.7   Albumin 3.1 (L)   Protein Total 6.6 (L)   Bilirubin Total 0.2   Alkaline Phosphatase 161 (H)   ALT 36   AST 24   Glucose 101 (H)   WBC 8.5   Hemoglobin 9.5 (L)   Hematocrit 30.2 (L)   Platelet Count 223   RBC Count 3.03 (L)      MCH 31.4   MCHC 31.5   RDW 17.4 (H)   % Neutrophils 56   % Lymphocytes 30   % Monocytes 12   % Eosinophils 0   % Basophils 1   % Myelocytes 1   Absolute Basophils 0.1   Absolute Neutrophil 4.8   Absolute Lymphocytes 2.6   Absolute Monocytes 1.0   Absolute Eosinophils 0.0   Absolute Myelocytes 0.1 (H)   RBC Morphology Confirmed RBC Indices   Platelet Morphology Automated Count Confirmed. Platelet morphology is normal.     CT CAP 3/8/22  FINDINGS:   LUNGS AND PLEURA: Stable 3 mm nodule in the lateral left lower lobe on  image 181 of series 4.     MEDIASTINUM/AXILLAE: Right chest port is present. Irregular enhancing  soft tissue in the anterior mediastinum is again noted. The superior  aspect does not appear changed measuring roughly 25 x 21 mm on image  22 of series 2. The inferior component has decreased since previous  exam and measures up to 2.8 cm on image 25 compared to 3.3 cm  previously.     HEPATOBILIARY: Multiple small cysts are seen through the liver. The  portal vein is  patent.     PANCREAS: Normal.     SPLEEN: The irregular soft tissue mass invading the posterior aspect  of the spleen and sitting just below the left hemidiaphragm has  decreased slightly in size and measures 6.8 x 5.1 cm compared to 7.7 x  5.8 cm.     ADRENAL GLANDS: Normal.     KIDNEYS/BLADDER: Multiple small cysts are seen through the kidneys. No  follow-up necessary for these.     BOWEL: Sigmoid diverticulosis is noted without evidence of  diverticulitis.     PELVIC ORGANS: The prostate is moderately enlarged and indents the  underside of the bladder. No free fluid.     ADDITIONAL FINDINGS: None.     MUSCULOSKELETAL: Degenerative changes are noted through the spine.                                                                      IMPRESSION:  1.  Slight decrease in size is noted in the inferior component of the  anterior mediastinal soft tissue mass. The superior component appears  unchanged.  2.  Interval mild decrease in size of the lobulated subdiaphragmatic  mass invading the posterior aspect of the spleen.  3.  Stable 3 mm left lower lobe nodule.     OPAL SINGLETON MD     Assessment and Plan:  1. 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 High PD-L1. Was on Keytruda but had progression, now on Doxil + Ifos started 10/26/21. Port placed 1/28/22.    He has now completed 4 cycles of Doxil + Ifos, has had multiple tolerance issues including nausea/vomiting, dehydration, neurotoxicity, mucositis, skin toxicity, pancytopenia, hypokalemia, hypophosphatemia. Currently he feels well and is recovered from last cycle.    CT from 3/8/22 reviewed with Dr. Sprague and patient with ongoing positive response to treatment. Given improvement in disease and toxicity issues Dr. Wolff recommended switching to Doxil alone.    Will start Doxil 70mg (keep same dose as I expect improved tolerance when given alone) tomorrow. Plan to do every 28 days. Do 2-3 cycles and then  repeat CT scan.     Will keep weekly labs and IVF this month but anticipate he will tolerate much better and likely won't need moving forward.      2. GI  Dyspepsia: Continue Omeprazole 40mg daily. Continue Tums PRN     CINV: Should not have issues with Doxil alone. Stop Zyprexa. Can use Zofran PRN.     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 Cal Saba endocrine.     Hypothyroidism continue Synthroid 75mcg daily.       4. Derm  Rosacea: Long standing issue, continue Metrogel PRN     Hand/foot: 2/2 Doxil, icing hands and feet. Continue emollients at home. Kenalog cream for bothersome/blistering and red areas. Skin improving and no functional limitations.       5. MSK  Left shoulder/back/chest wall pain 2/2 tumor, following with palliative. No pain currently, off all pain medications.       6. ENT  Hoarseness: Thought 2/2 mediastinal mass vs irritation from prior biopsy. Following with ENT, s/p vocal cord infection 7/1/21. Following with voice clinic. This is improved.     Mucositis/Thrush: Now resolved. Will ice mouth during Doxil infusion and continue salt/soda swishes. Will have him try Healios.     7. Pulm/Cards  Dyspnea with talking, prior work-up with CT PE negative for PE, infection, pneumonitis; troponin negative; COVID negative. Sating well on RA. Agree with speech pathology thoughts on laryngeal dysfunction. Improving.      Continue Guaifenesin-Coedine PRN for cough, much improved.     Off statin due to LFT elevation, improved.      8. FEN  Hypokalemia: Resolved, off supplement. Shouldn't have any issues with Doxil alone.      Hypophosphatemia: Resolved, off supplement. Shouldn't have any issues with Doxil alone.      Hypomag: Resolved.      Poor PO intake: 2/2 mucositis and nausea as above. Should be better on Doxil alone.      Dehydration: Will schedule weekly IVF though discussed he likely won't need them and can cancel if he is doing well.      9.  Psych/Neuro  Anxiety/depression: Hx of issues, recurrent issue when he was feeling poorly from treatment. Started on Lexapro by inpatient team which has helped. Mood much improved. Likely taper off Lexapro in the upcoming months.      Continue PRN Imitrex for migraines.      Had neuro toxicity inpatient with cycle 4 (monoclonic jerking, syncope, halluncinations), now resolved. Stopping Ifos.      10. Heme  Pancytopenia 2/2 chemotherapy. Improving. Should not have issues with Doxil alone.     50 minutes spent on the date of the encounter doing chart review, review of test results, interpretation of tests, patient visit, documentation and discussion with other provider(s)     Maynor Rios PA-C  Department of Hematology and Oncology  [facility]         Again, thank you for allowing me to participate in the care of your patient.        Sincerely,        GERALDO Mullins

## 2022-03-09 NOTE — LETTER
3/9/2022         RE: Ifrah Huitron  [address] [facility] [address]        Dear Colleague,    Thank you for referring your patient, Ifrah Huitron, to the [facility]. Please see a copy of my visit note below.    Ifrah is a 71 year old who is being evaluated via a billable video visit.      How would you like to obtain your AVS? MyChart  If the video visit is dropped, the invitation should be resent by: Send to e-mail at: [email or web address]  Will anyone else be joining your video visit? No     Amandagokul Hicksaraceli VF        Video Start Time: 1:29pm    Video-Visit Details    Type of service:  Video Visit    Video End Time:1:56pm    Originating Location (pt. Location): Home    Distant Location (provider location):  [facility]     Platform used for Video Visit: [employer]    Oncology/Hematology Visit Note  Mar 9, 2022    Reason for Visit: Follow up of spindle cell sarcoma     History of Present Illness: Ifrah Huitron is a 71 year old male with PMH rosacea, pituitary macroadenoma s/p resection, GERD, kidney stones, DJD with metastatic spindle cell sarcoma. He presented to his PCP March 2021 with chest pain/left shoulder and back pain that led to imaging which revealed multiple lung mets. There were difficulties in getting diagnostic biopsy, eventually biopsy of mediastinal mass with spindle cell sarcoma. There was high PD-L1 expression (90%). Baseline imaging 6/21/21 with progression of lung mets and left-sided subdiaphragmatic mass, stable liver lesions. 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      He started Keytruda 6/21/21. CT 8/2/21 with positive response to treatment. CT 10/18/21 with mixed response- LUQ mass larger, anterior mediastinal and left anterior pleural mass smaller. Keytruda stopped.     Started on Doxil + Ifosfamide 10/26/21. Poorly tolerated with mucositis,  "nausea, poor oral intake. CT with stable to positive response.      Received C2 12/1/21 (delayed for tolerance issues) with 10% dose reduction.     Received C3 1/4/22 with same 10% dose reduction. He had issues with nausea inpatient along with recurrent thrush.      Received C4 2/2/22 with same 10% dose reduction. Had more significant neurotoxicity so only received 5.5 days. Symptoms resolved after stopping Ifosfamide.     CT 3/8/22 with positive response to treatment.     Interval History:  Ifrah was called today for follow-up. He is feeling really well. Lots of energy, eating well, exercising. Had mild constipation but better now. No mouth issues. No nausea. No respiratory concerns other than occasional cough. His skin sores are healed and his hands/feet are still dry but no pain and still very functional. Nails are splitting. Has stable numbness in feet. No cancer related pain. All in all he feels really well.     Current Outpatient Medications   Medication Sig Dispense Refill     acetaminophen (TYLENOL) 500 MG tablet Take 500-1,000 mg by mouth every 6 hours as needed for mild pain       calcium carbonate (TUMS) 500 MG chewable tablet Take 1 tablet (500 mg) by mouth 3 times daily as needed for heartburn       cholecalciferol (VITAMIN D-1000 MAX ST) 1000 units TABS Take 1,000 Units by mouth daily        doxepin (SINEQUAN) 10 MG/ML (HIGH CONC) solution Take 2 mLs (20 mg) by mouth every 4 hours as needed for other (mouth pain) . Mix with equal parts tap water. Swish and hold in mouth ~60 seconds then spit out. 118 mL 3     escitalopram (LEXAPRO) 10 MG tablet Take 1 tablet (10 mg) by mouth daily 30 tablet 1     guaiFENesin-codeine (GUAIFENESIN AC) 100-10 MG/5ML syrup Take 10 mLs by mouth every 4 hours as needed for cough 118 mL 0     hydrocortisone (CORTEF) 10 MG tablet Take 20 mg in the morning and 10 mg in the afternoon 270 tablet 3     Insulin Syringe-Needle U-100 27.5G X 5/8\" 2 ML MISC 1 each daily as needed (with " solucortef for adrenal crisis) 10 each 1     levothyroxine (SYNTHROID/LEVOTHROID) 75 MCG tablet Take 1 tablet (75 mcg) by mouth every morning 90 tablet 3     LORazepam (ATIVAN) 0.5 MG tablet Take 1 tablet (0.5 mg) by mouth every 4 hours as needed for nausea or vomiting . Caution: causes sedation. 30 tablet 0     Menthol-Methyl Salicylate (DALIA MALIK GREASELESS) cream Apply topically every 6 hours as needed       metroNIDAZOLE (METROGEL) 1 % external gel Apply topically daily .Try stronger dose due to increased symptoms after chemo. 30 g 0     nystatin (MYCOSTATIN) 564685 UNIT/ML suspension Take 5 mLs (500,000 Units) by mouth 4 times daily Hold while taking Fluconazole, could resume after Fluconazole if you have ongoing coating on tongue. 473 mL 0     OLANZapine (ZYPREXA) 5 MG tablet Take 1 tablet (5 mg) by mouth At Bedtime Continue until your nausea resolves 30 tablet 1     omeprazole (PRILOSEC) 20 MG DR capsule Take 2 capsules (40 mg) by mouth daily 60 capsule 1     ondansetron (ZOFRAN) 4 MG tablet Take 1-2 tablets (4-8 mg) by mouth every 8 hours as needed for nausea 60 tablet 3     prochlorperazine (COMPAZINE) 5 MG tablet Take 1 tablet (5 mg) by mouth every 6 hours as needed for nausea or vomiting . Caution: causes sedation. 30 tablet 1     SUMAtriptan (IMITREX) 50 MG tablet Take 1 tablet (50 mg) by mouth at onset of headache for migraine May repeat in 2 hours. Max 4 tablets/24 hours. 10 tablet 3     triamcinolone (KENALOG) 0.1 % external cream Apply topically 2 times daily to bothersome skin areas. 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     Thyroid disease     removed pituitary gland     Past Surgical History:   Procedure Laterality Date     COLONOSCOPY N/A 12/17/2020    Procedure: COLONOSCOPY;  Surgeon: Ken Camacho MD;  Location: [facility]     ENT SURGERY       HERNIA REPAIR       INSERT  PORT VASCULAR ACCESS Right 1/28/2022    Procedure: INSERTION, VASCULAR ACCESS PORT;  Surgeon: Daniel Kinney MD;  Location: [facility] OR     IR CHEST PORT PLACEMENT > 5 YRS OF AGE  1/28/2022     LARYNGOSCOPY, EXCISE VOCAL CORD LESION MICROSCOPIC, COMBINED Left 07/01/2021    Procedure: MICROLARYNGOSCOPY, LEFT TRUE VOCAL CORD INJECTION WITH PROLARYN;  Surgeon: Kyree Bearden MD;  Location: [facility]     PHACOEMULSIFICATION WITH STANDARD INTRAOCULAR LENS IMPLANT Right 03/10/2021    Procedure: Cataract removal with implant.;  Surgeon: Jamir Mac MD;  Location: [facility]     PHACOEMULSIFICATION WITH STANDARD INTRAOCULAR LENS IMPLANT Left 04/05/2021    Procedure: Cataract removal with implant.;  Surgeon: Jamir Mac MD;  Location: [facility]     PICC DOUBLE LUMEN PLACEMENT Right 12/01/2021    5FR DL PICC, basilic vein. L-38cm, 1cm out.     PICC DOUBLE LUMEN PLACEMENT Right 01/04/2022    Right cephalic, 41 cm, 1 external length     PITUITARY EXCISION       tooth pulled 4/7  Right      Allergies   Allergen Reactions     Penicillins Hives and Swelling            Social History   Social History     Tobacco Use     Smoking status: Never Smoker     Smokeless tobacco: Never Used   Substance Use Topics     Alcohol use: Yes     Comment: rare     Drug use: Never      Past medical history and social history were reviewed.    Physical Examination:  There were no vitals taken for this visit.  Wt Readings from Last 10 Encounters:   02/08/22 63 kg (138 lb 14.4 oz)   01/28/22 65.8 kg (145 lb)   01/11/22 65.1 kg (143 lb 8 oz)   12/09/21 66.7 kg (147 lb)   12/09/21 67 kg (147 lb 11.2 oz)   12/08/21 64.8 kg (142 lb 13.7 oz)   11/12/21 68 kg (150 lb)   11/11/21 69.9 kg (154 lb)   11/04/21 70 kg (154 lb 6.4 oz)   11/02/21 85.9 kg (189 lb 6.4 oz)     Video physical exam  General: Patient appears well in no acute distress.   Skin: No visualized rash or lesions on visualized skin  Eyes: EOMI, no erythema, sclera icterus or  discharge noted  Resp: Appears to be breathing comfortably without accessory muscle usage, speaking in full sentences, no cough  MSK: Appears to have normal range of motion based on visualized movements  Neurologic: No apparent tremors, facial movements symmetric  Psych: affect normal, alert and oriented    The rest of a comprehensive physical examination is deferred due to PHE (public health emergency) video restrictions    Laboratory Data:  Results for MARISOL XIE (MRN [id number]) as of 3/9/2022 14:04   3/7/2022 10:19   Sodium 142   Potassium 3.5   Chloride 108   Carbon Dioxide 29   Urea Nitrogen 15   Creatinine 1.04   GFR Estimate 76   Calcium 8.8   Anion Gap 5   Magnesium 2.3   Phosphorus 2.7   Albumin 3.1 (L)   Protein Total 6.6 (L)   Bilirubin Total 0.2   Alkaline Phosphatase 161 (H)   ALT 36   AST 24   Glucose 101 (H)   WBC 8.5   Hemoglobin 9.5 (L)   Hematocrit 30.2 (L)   Platelet Count 223   RBC Count 3.03 (L)      MCH 31.4   MCHC 31.5   RDW 17.4 (H)   % Neutrophils 56   % Lymphocytes 30   % Monocytes 12   % Eosinophils 0   % Basophils 1   % Myelocytes 1   Absolute Basophils 0.1   Absolute Neutrophil 4.8   Absolute Lymphocytes 2.6   Absolute Monocytes 1.0   Absolute Eosinophils 0.0   Absolute Myelocytes 0.1 (H)   RBC Morphology Confirmed RBC Indices   Platelet Morphology Automated Count Confirmed. Platelet morphology is normal.     CT CAP 3/8/22  FINDINGS:   LUNGS AND PLEURA: Stable 3 mm nodule in the lateral left lower lobe on  image 181 of series 4.     MEDIASTINUM/AXILLAE: Right chest port is present. Irregular enhancing  soft tissue in the anterior mediastinum is again noted. The superior  aspect does not appear changed measuring roughly 25 x 21 mm on image  22 of series 2. The inferior component has decreased since previous  exam and measures up to 2.8 cm on image 25 compared to 3.3 cm  previously.     HEPATOBILIARY: Multiple small cysts are seen through the liver. The  portal vein is  patent.     PANCREAS: Normal.     SPLEEN: The irregular soft tissue mass invading the posterior aspect  of the spleen and sitting just below the left hemidiaphragm has  decreased slightly in size and measures 6.8 x 5.1 cm compared to 7.7 x  5.8 cm.     ADRENAL GLANDS: Normal.     KIDNEYS/BLADDER: Multiple small cysts are seen through the kidneys. No  follow-up necessary for these.     BOWEL: Sigmoid diverticulosis is noted without evidence of  diverticulitis.     PELVIC ORGANS: The prostate is moderately enlarged and indents the  underside of the bladder. No free fluid.     ADDITIONAL FINDINGS: None.     MUSCULOSKELETAL: Degenerative changes are noted through the spine.                                                                      IMPRESSION:  1.  Slight decrease in size is noted in the inferior component of the  anterior mediastinal soft tissue mass. The superior component appears  unchanged.  2.  Interval mild decrease in size of the lobulated subdiaphragmatic  mass invading the posterior aspect of the spleen.  3.  Stable 3 mm left lower lobe nodule.     OPAL SINGLETON MD     Assessment and Plan:  1. 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 High PD-L1. Was on Keytruda but had progression, now on Doxil + Ifos started 10/26/21. Port placed 1/28/22.    He has now completed 4 cycles of Doxil + Ifos, has had multiple tolerance issues including nausea/vomiting, dehydration, neurotoxicity, mucositis, skin toxicity, pancytopenia, hypokalemia, hypophosphatemia. Currently he feels well and is recovered from last cycle.    CT from 3/8/22 reviewed with Dr. Sprague and patient with ongoing positive response to treatment. Given improvement in disease and toxicity issues Dr. Wolff recommended switching to Doxil alone.    Will start Doxil 70mg (keep same dose as I expect improved tolerance when given alone) tomorrow. Plan to do every 28 days. Do 2-3 cycles and then  repeat CT scan.     Will keep weekly labs and IVF this month but anticipate he will tolerate much better and likely won't need moving forward.      2. GI  Dyspepsia: Continue Omeprazole 40mg daily. Continue Tums PRN     CINV: Should not have issues with Doxil alone. Stop Zyprexa. Can use Zofran PRN.     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 Cal Saba endocrine.     Hypothyroidism continue Synthroid 75mcg daily.       4. Derm  Rosacea: Long standing issue, continue Metrogel PRN     Hand/foot: 2/2 Doxil, icing hands and feet. Continue emollients at home. Kenalog cream for bothersome/blistering and red areas. Skin improving and no functional limitations.       5. MSK  Left shoulder/back/chest wall pain 2/2 tumor, following with palliative. No pain currently, off all pain medications.       6. ENT  Hoarseness: Thought 2/2 mediastinal mass vs irritation from prior biopsy. Following with ENT, s/p vocal cord infection 7/1/21. Following with voice clinic. This is improved.     Mucositis/Thrush: Now resolved. Will ice mouth during Doxil infusion and continue salt/soda swishes. Will have him try Healios.     7. Pulm/Cards  Dyspnea with talking, prior work-up with CT PE negative for PE, infection, pneumonitis; troponin negative; COVID negative. Sating well on RA. Agree with speech pathology thoughts on laryngeal dysfunction. Improving.      Continue Guaifenesin-Coedine PRN for cough, much improved.     Off statin due to LFT elevation, improved.      8. FEN  Hypokalemia: Resolved, off supplement. Shouldn't have any issues with Doxil alone.      Hypophosphatemia: Resolved, off supplement. Shouldn't have any issues with Doxil alone.      Hypomag: Resolved.      Poor PO intake: 2/2 mucositis and nausea as above. Should be better on Doxil alone.      Dehydration: Will schedule weekly IVF though discussed he likely won't need them and can cancel if he is doing well.      9.  Psych/Neuro  Anxiety/depression: Hx of issues, recurrent issue when he was feeling poorly from treatment. Started on Lexapro by inpatient team which has helped. Mood much improved. Likely taper off Lexapro in the upcoming months.      Continue PRN Imitrex for migraines.      Had neuro toxicity inpatient with cycle 4 (monoclonic jerking, syncope, halluncinations), now resolved. Stopping Ifos.      10. Heme  Pancytopenia 2/2 chemotherapy. Improving. Should not have issues with Doxil alone.     50 minutes spent on the date of the encounter doing chart review, review of test results, interpretation of tests, patient visit, documentation and discussion with other provider(s)     Maynor Rios PA-C  Department of Hematology and Oncology  [facility]         Again, thank you for allowing me to participate in the care of your patient.        Sincerely,        GERALDO Mullins

## 2022-03-09 NOTE — NURSING NOTE
Patient confirms medications and allergies are accurate via patients echeck in completion, and or denies any changes since last reviewed/verified.     Matilde Robledo, Virtual Facilitator

## 2022-03-10 ENCOUNTER — LAB (OUTPATIENT)
Dept: INFUSION THERAPY | Facility: CLINIC | Age: 72
End: 2022-03-10
Attending: PHYSICIAN ASSISTANT
Payer: MEDICARE

## 2022-03-10 VITALS
BODY MASS INDEX: 22.87 KG/M2 | SYSTOLIC BLOOD PRESSURE: 157 MMHG | WEIGHT: 146 LBS | TEMPERATURE: 97.8 F | HEART RATE: 68 BPM | DIASTOLIC BLOOD PRESSURE: 88 MMHG

## 2022-03-10 DIAGNOSIS — E87.6 HYPOKALEMIA: ICD-10-CM

## 2022-03-10 DIAGNOSIS — Z51.89 ENCOUNTER FOR OTHER SPECIFIED AFTERCARE: ICD-10-CM

## 2022-03-10 DIAGNOSIS — C45.9 MESOTHELIOMA, MALIGNANT (H): ICD-10-CM

## 2022-03-10 DIAGNOSIS — C49.9 SPINDLE CELL SARCOMA (H): ICD-10-CM

## 2022-03-10 DIAGNOSIS — T45.1X5A CHEMOTHERAPY-INDUCED NEUTROPENIA (H): ICD-10-CM

## 2022-03-10 DIAGNOSIS — D70.1 CHEMOTHERAPY-INDUCED NEUTROPENIA (H): Primary | ICD-10-CM

## 2022-03-10 DIAGNOSIS — T45.1X5A CHEMOTHERAPY-INDUCED NEUTROPENIA (H): Primary | ICD-10-CM

## 2022-03-10 DIAGNOSIS — D70.1 CHEMOTHERAPY-INDUCED NEUTROPENIA (H): ICD-10-CM

## 2022-03-10 DIAGNOSIS — Z51.89 ENCOUNTER FOR OTHER SPECIFIED AFTERCARE: Primary | ICD-10-CM

## 2022-03-10 LAB
ALBUMIN SERPL-MCNC: 3.2 G/DL (ref 3.4–5)
ALP SERPL-CCNC: 139 U/L (ref 40–150)
ALT SERPL W P-5'-P-CCNC: 26 U/L (ref 0–70)
ANION GAP SERPL CALCULATED.3IONS-SCNC: 4 MMOL/L (ref 3–14)
AST SERPL W P-5'-P-CCNC: 16 U/L (ref 0–45)
BASOPHILS # BLD MANUAL: 0 10E3/UL (ref 0–0.2)
BASOPHILS NFR BLD MANUAL: 0 %
BILIRUB SERPL-MCNC: 0.2 MG/DL (ref 0.2–1.3)
BUN SERPL-MCNC: 12 MG/DL (ref 7–30)
CALCIUM SERPL-MCNC: 8.7 MG/DL (ref 8.5–10.1)
CHLORIDE BLD-SCNC: 108 MMOL/L (ref 94–109)
CO2 SERPL-SCNC: 31 MMOL/L (ref 20–32)
CREAT SERPL-MCNC: 1.02 MG/DL (ref 0.66–1.25)
EOSINOPHIL # BLD MANUAL: 0.1 10E3/UL (ref 0–0.7)
EOSINOPHIL NFR BLD MANUAL: 1 %
ERYTHROCYTE [DISTWIDTH] IN BLOOD BY AUTOMATED COUNT: 17.4 % (ref 10–15)
GFR SERPL CREATININE-BSD FRML MDRD: 78 ML/MIN/1.73M2
GLUCOSE BLD-MCNC: 106 MG/DL (ref 70–99)
HCT VFR BLD AUTO: 31.2 % (ref 40–53)
HGB BLD-MCNC: 9.6 G/DL (ref 13.3–17.7)
LYMPHOCYTES # BLD MANUAL: 1.2 10E3/UL (ref 0.8–5.3)
LYMPHOCYTES NFR BLD MANUAL: 14 %
MAGNESIUM SERPL-MCNC: 2.2 MG/DL (ref 1.6–2.3)
MCH RBC QN AUTO: 30.5 PG (ref 26.5–33)
MCHC RBC AUTO-ENTMCNC: 30.8 G/DL (ref 31.5–36.5)
MCV RBC AUTO: 99 FL (ref 78–100)
METAMYELOCYTES # BLD MANUAL: 0.1 10E3/UL
METAMYELOCYTES NFR BLD MANUAL: 1 %
MONOCYTES # BLD MANUAL: 1.4 10E3/UL (ref 0–1.3)
MONOCYTES NFR BLD MANUAL: 16 %
MYELOCYTES # BLD MANUAL: 0.2 10E3/UL
MYELOCYTES NFR BLD MANUAL: 2 %
NEUTROPHILS # BLD MANUAL: 5.6 10E3/UL (ref 1.6–8.3)
NEUTROPHILS NFR BLD MANUAL: 66 %
PHOSPHATE SERPL-MCNC: 2.6 MG/DL (ref 2.5–4.5)
PLAT MORPH BLD: ABNORMAL
PLATELET # BLD AUTO: 220 10E3/UL (ref 150–450)
POTASSIUM BLD-SCNC: 3.3 MMOL/L (ref 3.4–5.3)
PROT SERPL-MCNC: 6.5 G/DL (ref 6.8–8.8)
RBC # BLD AUTO: 3.15 10E6/UL (ref 4.4–5.9)
RBC MORPH BLD: ABNORMAL
SODIUM SERPL-SCNC: 143 MMOL/L (ref 133–144)
WBC # BLD AUTO: 8.5 10E3/UL (ref 4–11)

## 2022-03-10 PROCEDURE — 250N000013 HC RX MED GY IP 250 OP 250 PS 637: Performed by: PHYSICIAN ASSISTANT

## 2022-03-10 PROCEDURE — 250N000011 HC RX IP 250 OP 636: Performed by: PHYSICIAN ASSISTANT

## 2022-03-10 PROCEDURE — 84100 ASSAY OF PHOSPHORUS: CPT | Performed by: PHYSICIAN ASSISTANT

## 2022-03-10 PROCEDURE — 85027 COMPLETE CBC AUTOMATED: CPT | Performed by: PHYSICIAN ASSISTANT

## 2022-03-10 PROCEDURE — 80051 ELECTROLYTE PANEL: CPT | Performed by: PHYSICIAN ASSISTANT

## 2022-03-10 PROCEDURE — 82947 ASSAY GLUCOSE BLOOD QUANT: CPT | Performed by: PHYSICIAN ASSISTANT

## 2022-03-10 PROCEDURE — 258N000003 HC RX IP 258 OP 636: Performed by: PHYSICIAN ASSISTANT

## 2022-03-10 PROCEDURE — 83735 ASSAY OF MAGNESIUM: CPT | Performed by: PHYSICIAN ASSISTANT

## 2022-03-10 PROCEDURE — 96365 THER/PROPH/DIAG IV INF INIT: CPT

## 2022-03-10 RX ORDER — HEPARIN SODIUM (PORCINE) LOCK FLUSH IV SOLN 100 UNIT/ML 100 UNIT/ML
5 SOLUTION INTRAVENOUS
Status: DISCONTINUED | OUTPATIENT
Start: 2022-03-10 | End: 2022-03-10 | Stop reason: HOSPADM

## 2022-03-10 RX ORDER — POTASSIUM CHLORIDE 1500 MG/1
40 TABLET, EXTENDED RELEASE ORAL ONCE
Status: COMPLETED | OUTPATIENT
Start: 2022-03-10 | End: 2022-03-10

## 2022-03-10 RX ADMIN — Medication 5 ML: at 12:15

## 2022-03-10 RX ADMIN — DOXORUBICIN HYDROCHLORIDE 70 MG: 2 INJECTABLE, LIPOSOMAL INTRAVENOUS at 11:07

## 2022-03-10 RX ADMIN — DEXTROSE MONOHYDRATE 250 ML: 50 INJECTION, SOLUTION INTRAVENOUS at 11:07

## 2022-03-10 RX ADMIN — POTASSIUM CHLORIDE 40 MEQ: 20 TABLET, EXTENDED RELEASE ORAL at 12:13

## 2022-03-10 NOTE — PROGRESS NOTES
Infusion Nursing Note:  Ifrah Huitron presents today for C5D1 DOXIL    Patient seen by provider today: No   present during visit today: Not Applicable.    Note: N/A.      Intravenous Access:  Labs drawn without difficulty.  Implanted Port.    Treatment Conditions:  Lab Results   Component Value Date    HGB 9.6 (L) 03/10/2022    WBC 8.5 03/10/2022    ANEU 5.6 03/10/2022    ANEUTAUTO 3.4 02/07/2022     03/10/2022      Lab Results   Component Value Date     03/10/2022    POTASSIUM 3.3 (L) 03/10/2022    MAG 2.2 03/10/2022    CR 1.02 03/10/2022    COTY 8.7 03/10/2022    BILITOTAL 0.2 03/10/2022    ALBUMIN 3.2 (L) 03/10/2022    ALT 26 03/10/2022    AST 16 03/10/2022     Results reviewed, labs MET treatment parameters, ok to proceed with treatment.    Pt requested oral potassium replacement today instead of IV replacement for potassium of 3.3.  Pt received potassium chloride ER 40 mEq.        Post Infusion Assessment:  Patient tolerated infusion without incident.  Blood return noted pre and post infusion.  Site patent and intact, free from redness, edema or discomfort.  No evidence of extravasations.  Access discontinued per protocol.       Discharge Plan:   Patient discharged in stable condition accompanied by: self.  Departure Mode: Ambulatory.      Cathy Mann RN

## 2022-03-11 ENCOUNTER — PATIENT OUTREACH (OUTPATIENT)
Dept: CARE COORDINATION | Facility: CLINIC | Age: 72
End: 2022-03-11
Payer: MEDICARE

## 2022-03-11 DIAGNOSIS — C49.9 SARCOMA (H): Primary | ICD-10-CM

## 2022-03-11 ASSESSMENT — ACTIVITIES OF DAILY LIVING (ADL): DEPENDENT_IADLS:: INDEPENDENT

## 2022-03-11 NOTE — PROGRESS NOTES
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  Left message on patient's voicemail with call back information and requested return call.  Plan: . Care Coordinator will try to reach patient again in 10 business days.  [facility]   Oliva García RN, Care Coordinator   [facility]'s   E-mail [email or web address]   [phone]

## 2022-03-15 ENCOUNTER — MYC MEDICAL ADVICE (OUTPATIENT)
Dept: ONCOLOGY | Facility: CLINIC | Age: 72
End: 2022-03-15
Payer: MEDICARE

## 2022-03-15 NOTE — TELEPHONE ENCOUNTER
[facility] Triage    MyChart Message:    Ifrah hasn't felt well since last infusion  Not motivated  Naps frequently   Some nausea and zofran takes care of that  No fever  No CP  No SOB  He has felt the same way before when he has needed IVF and potassium.  Hard for Ifrah to describe how he is feeling.    Reviewed last visit on 3/9/22 with Maynor and copied note:    Will keep weekly labs and IVF this month but anticipate he will tolerate much better and likely won't need moving forward.     Transferred Ifrah to scheduling for labs and IVF.    Routing to aMynor Mcguire responded he should get labs today or tomorrow (he has labs for Thursday) and he can start his zyprexa back up tonight.    STACEY stating Maynor advice and sent a [email or web address] Message stating the above.    Routing to Maynor Rios

## 2022-03-17 ENCOUNTER — LAB (OUTPATIENT)
Dept: INFUSION THERAPY | Facility: CLINIC | Age: 72
End: 2022-03-17
Attending: PHYSICIAN ASSISTANT
Payer: MEDICARE

## 2022-03-17 ENCOUNTER — PATIENT OUTREACH (OUTPATIENT)
Dept: CARE COORDINATION | Facility: CLINIC | Age: 72
End: 2022-03-17

## 2022-03-17 VITALS
DIASTOLIC BLOOD PRESSURE: 77 MMHG | TEMPERATURE: 97.3 F | SYSTOLIC BLOOD PRESSURE: 113 MMHG | RESPIRATION RATE: 18 BRPM | HEART RATE: 78 BPM

## 2022-03-17 DIAGNOSIS — D70.1 CHEMOTHERAPY-INDUCED NEUTROPENIA (H): Primary | ICD-10-CM

## 2022-03-17 DIAGNOSIS — R11.0 CHEMOTHERAPY-INDUCED NAUSEA: ICD-10-CM

## 2022-03-17 DIAGNOSIS — C49.9 SPINDLE CELL SARCOMA (H): ICD-10-CM

## 2022-03-17 DIAGNOSIS — T45.1X5A CHEMOTHERAPY-INDUCED NAUSEA: ICD-10-CM

## 2022-03-17 DIAGNOSIS — T45.1X5A CHEMOTHERAPY-INDUCED NEUTROPENIA (H): Primary | ICD-10-CM

## 2022-03-17 DIAGNOSIS — C49.9 SARCOMA (H): ICD-10-CM

## 2022-03-17 DIAGNOSIS — C49.9 SARCOMA (H): Primary | ICD-10-CM

## 2022-03-17 LAB
ALBUMIN SERPL-MCNC: 3.3 G/DL (ref 3.4–5)
ALP SERPL-CCNC: 125 U/L (ref 40–150)
ALT SERPL W P-5'-P-CCNC: 18 U/L (ref 0–70)
ANION GAP SERPL CALCULATED.3IONS-SCNC: 5 MMOL/L (ref 3–14)
AST SERPL W P-5'-P-CCNC: 13 U/L (ref 0–45)
BASOPHILS # BLD AUTO: 0 10E3/UL (ref 0–0.2)
BASOPHILS NFR BLD AUTO: 0 %
BILIRUB SERPL-MCNC: 0.2 MG/DL (ref 0.2–1.3)
BUN SERPL-MCNC: 18 MG/DL (ref 7–30)
CALCIUM SERPL-MCNC: 9.2 MG/DL (ref 8.5–10.1)
CHLORIDE BLD-SCNC: 107 MMOL/L (ref 94–109)
CO2 SERPL-SCNC: 30 MMOL/L (ref 20–32)
CREAT SERPL-MCNC: 1.06 MG/DL (ref 0.66–1.25)
EOSINOPHIL # BLD AUTO: 0.2 10E3/UL (ref 0–0.7)
EOSINOPHIL NFR BLD AUTO: 2 %
ERYTHROCYTE [DISTWIDTH] IN BLOOD BY AUTOMATED COUNT: 16.6 % (ref 10–15)
GFR SERPL CREATININE-BSD FRML MDRD: 74 ML/MIN/1.73M2
GLUCOSE BLD-MCNC: 107 MG/DL (ref 70–99)
HCT VFR BLD AUTO: 31 % (ref 40–53)
HGB BLD-MCNC: 9.8 G/DL (ref 13.3–17.7)
IMM GRANULOCYTES # BLD: 0.1 10E3/UL
IMM GRANULOCYTES NFR BLD: 1 %
LYMPHOCYTES # BLD AUTO: 1.3 10E3/UL (ref 0.8–5.3)
LYMPHOCYTES NFR BLD AUTO: 15 %
MAGNESIUM SERPL-MCNC: 2.7 MG/DL (ref 1.6–2.3)
MCH RBC QN AUTO: 30.9 PG (ref 26.5–33)
MCHC RBC AUTO-ENTMCNC: 31.6 G/DL (ref 31.5–36.5)
MCV RBC AUTO: 98 FL (ref 78–100)
MONOCYTES # BLD AUTO: 0.7 10E3/UL (ref 0–1.3)
MONOCYTES NFR BLD AUTO: 9 %
NEUTROPHILS # BLD AUTO: 6.3 10E3/UL (ref 1.6–8.3)
NEUTROPHILS NFR BLD AUTO: 73 %
NRBC # BLD AUTO: 0 10E3/UL
NRBC BLD AUTO-RTO: 0 /100
PHOSPHATE SERPL-MCNC: 2.4 MG/DL (ref 2.5–4.5)
PLATELET # BLD AUTO: 197 10E3/UL (ref 150–450)
POTASSIUM BLD-SCNC: 3.5 MMOL/L (ref 3.4–5.3)
PROT SERPL-MCNC: 6.8 G/DL (ref 6.8–8.8)
RBC # BLD AUTO: 3.17 10E6/UL (ref 4.4–5.9)
SODIUM SERPL-SCNC: 142 MMOL/L (ref 133–144)
WBC # BLD AUTO: 8.5 10E3/UL (ref 4–11)

## 2022-03-17 PROCEDURE — 96360 HYDRATION IV INFUSION INIT: CPT

## 2022-03-17 PROCEDURE — 84100 ASSAY OF PHOSPHORUS: CPT | Performed by: PHYSICIAN ASSISTANT

## 2022-03-17 PROCEDURE — 82040 ASSAY OF SERUM ALBUMIN: CPT | Performed by: PHYSICIAN ASSISTANT

## 2022-03-17 PROCEDURE — 36591 DRAW BLOOD OFF VENOUS DEVICE: CPT

## 2022-03-17 PROCEDURE — 258N000003 HC RX IP 258 OP 636: Performed by: PHYSICIAN ASSISTANT

## 2022-03-17 PROCEDURE — 250N000011 HC RX IP 250 OP 636: Performed by: PHYSICIAN ASSISTANT

## 2022-03-17 PROCEDURE — 80053 COMPREHEN METABOLIC PANEL: CPT | Performed by: PHYSICIAN ASSISTANT

## 2022-03-17 PROCEDURE — 85025 COMPLETE CBC W/AUTO DIFF WBC: CPT | Performed by: PHYSICIAN ASSISTANT

## 2022-03-17 PROCEDURE — 83735 ASSAY OF MAGNESIUM: CPT | Performed by: PHYSICIAN ASSISTANT

## 2022-03-17 RX ORDER — HEPARIN SODIUM (PORCINE) LOCK FLUSH IV SOLN 100 UNIT/ML 100 UNIT/ML
5 SOLUTION INTRAVENOUS
Status: COMPLETED | OUTPATIENT
Start: 2022-03-17 | End: 2022-03-17

## 2022-03-17 RX ORDER — HEPARIN SODIUM (PORCINE) LOCK FLUSH IV SOLN 100 UNIT/ML 100 UNIT/ML
5 SOLUTION INTRAVENOUS
Status: CANCELLED
Start: 2022-03-17

## 2022-03-17 RX ADMIN — Medication 5 ML: at 14:12

## 2022-03-17 RX ADMIN — SODIUM CHLORIDE 1000 ML: 9 INJECTION, SOLUTION INTRAVENOUS at 13:08

## 2022-03-17 ASSESSMENT — ACTIVITIES OF DAILY LIVING (ADL): DEPENDENT_IADLS:: INDEPENDENT

## 2022-03-17 NOTE — PROGRESS NOTES
Labs drawn via port without incident.  Erin Lowe RN   Steven - it was nice to see you today!    1. Reviewed your EKG today, which looked good  2. Looked at BPs, which look good  3. Last pacer check 9/2020 looked good    PLAN:  1. I sent refills for the metoprolol, amlodipine and lisinopril/HCTZ   2. EXERCISE!  Walking, BIG program. Listen to your wife :-)    3. See us back in 1 year (we'll coordinate with the pacer check Fall 2021). CALL if issues prior! [phone]

## 2022-03-17 NOTE — PROGRESS NOTES
"Infusion Nursing Note:  Ifrah Huitron presents today for IVF.    Patient seen by provider today: No   present during visit today: Not Applicable.    Note: Patient states he has not needed his anti-emetics today. He declined IV anti-emetics in clinic today. He feels as if he is drinking enough, but still feels \"lowsy\". Patient requesting IVF.    Intravenous Access:  Implanted Port.    Treatment Conditions:  Lab Results   Component Value Date    HGB 9.8 (L) 03/17/2022    WBC 8.5 03/17/2022    ANEU 5.6 03/10/2022    ANEUTAUTO 6.3 03/17/2022     03/17/2022      Lab Results   Component Value Date     03/17/2022    POTASSIUM 3.5 03/17/2022    MAG 2.7 (H) 03/17/2022    CR 1.06 03/17/2022    COTY 9.2 03/17/2022    BILITOTAL 0.2 03/17/2022    ALBUMIN 3.3 (L) 03/17/2022    ALT 18 03/17/2022    AST 13 03/17/2022     Post Infusion Assessment:  Patient tolerated infusion without incident.  Blood return noted pre and post infusion.  Site patent and intact, free from redness, edema or discomfort.  No evidence of extravasations.  Access discontinued per protocol.     Discharge Plan:   Discharge instructions reviewed with: Patient.  Patient and/or family verbalized understanding of discharge instructions and all questions answered.  AVS to patient via [employer].  Patient will return 3/24/2022 for next appointment.   Patient discharged in stable condition accompanied by: self.  Departure Mode: Ambulatory.    Erin Lowe RN  "

## 2022-03-17 NOTE — LETTER
[facility] COORDIN[facility]  [address]              [place] [address]    March 21, 2022    Ifrah Huitron  [address] [place] [address]      Dear Ifrah,    I have been unsuccessful in reaching you since our last contact. At this time the Care Coordination team will make no further attempts to reach you, however this does not change your ability to continue receiving care from your providers at your primary care clinic. If you need additional support from a care coordinator in the future please contact me at [phone].    All of us at [facility] are invested in your health and are here to assist you in meeting your goals.     Sincerely,    [facility]   Oliva García RN, Care Coordinator   [facility]'s   E-mail [email or web address]   [phone]

## 2022-03-17 NOTE — PROGRESS NOTES
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  Left message on patient's voicemail with call back information and requested return call.    Plan: . Care Coordinator will do no further outreaches at this time.    [facility]   Oliva García RN, Care Coordinator   [facility]'s   E-mail [email or web address]   [phone]

## 2022-03-24 ENCOUNTER — LAB (OUTPATIENT)
Dept: INFUSION THERAPY | Facility: CLINIC | Age: 72
End: 2022-03-24
Attending: PHYSICIAN ASSISTANT
Payer: MEDICARE

## 2022-03-24 VITALS — TEMPERATURE: 98.1 F | SYSTOLIC BLOOD PRESSURE: 148 MMHG | DIASTOLIC BLOOD PRESSURE: 87 MMHG

## 2022-03-24 DIAGNOSIS — C49.9 SPINDLE CELL SARCOMA (H): ICD-10-CM

## 2022-03-24 DIAGNOSIS — R11.0 CHEMOTHERAPY-INDUCED NAUSEA: ICD-10-CM

## 2022-03-24 DIAGNOSIS — E87.6 HYPOKALEMIA: Primary | ICD-10-CM

## 2022-03-24 DIAGNOSIS — C49.9 SARCOMA (H): Primary | ICD-10-CM

## 2022-03-24 DIAGNOSIS — T45.1X5A CHEMOTHERAPY-INDUCED NAUSEA: ICD-10-CM

## 2022-03-24 LAB
ALBUMIN SERPL-MCNC: 3.2 G/DL (ref 3.4–5)
ALP SERPL-CCNC: 117 U/L (ref 40–150)
ALT SERPL W P-5'-P-CCNC: 18 U/L (ref 0–70)
ANION GAP SERPL CALCULATED.3IONS-SCNC: 4 MMOL/L (ref 3–14)
AST SERPL W P-5'-P-CCNC: 15 U/L (ref 0–45)
BASOPHILS # BLD AUTO: 0 10E3/UL (ref 0–0.2)
BASOPHILS NFR BLD AUTO: 1 %
BILIRUB SERPL-MCNC: 0.2 MG/DL (ref 0.2–1.3)
BUN SERPL-MCNC: 18 MG/DL (ref 7–30)
CALCIUM SERPL-MCNC: 9.1 MG/DL (ref 8.5–10.1)
CHLORIDE BLD-SCNC: 108 MMOL/L (ref 94–109)
CO2 SERPL-SCNC: 29 MMOL/L (ref 20–32)
CREAT SERPL-MCNC: 0.96 MG/DL (ref 0.66–1.25)
EOSINOPHIL # BLD AUTO: 0.2 10E3/UL (ref 0–0.7)
EOSINOPHIL NFR BLD AUTO: 4 %
ERYTHROCYTE [DISTWIDTH] IN BLOOD BY AUTOMATED COUNT: 16.9 % (ref 10–15)
GFR SERPL CREATININE-BSD FRML MDRD: 83 ML/MIN/1.73M2
GLUCOSE BLD-MCNC: 111 MG/DL (ref 70–99)
HCT VFR BLD AUTO: 30 % (ref 40–53)
HGB BLD-MCNC: 9.6 G/DL (ref 13.3–17.7)
IMM GRANULOCYTES # BLD: 0 10E3/UL
IMM GRANULOCYTES NFR BLD: 0 %
LYMPHOCYTES # BLD AUTO: 1.5 10E3/UL (ref 0.8–5.3)
LYMPHOCYTES NFR BLD AUTO: 22 %
MAGNESIUM SERPL-MCNC: 2.3 MG/DL (ref 1.6–2.3)
MCH RBC QN AUTO: 31.4 PG (ref 26.5–33)
MCHC RBC AUTO-ENTMCNC: 32 G/DL (ref 31.5–36.5)
MCV RBC AUTO: 98 FL (ref 78–100)
MONOCYTES # BLD AUTO: 0.3 10E3/UL (ref 0–1.3)
MONOCYTES NFR BLD AUTO: 4 %
NEUTROPHILS # BLD AUTO: 4.8 10E3/UL (ref 1.6–8.3)
NEUTROPHILS NFR BLD AUTO: 69 %
NRBC # BLD AUTO: 0 10E3/UL
NRBC BLD AUTO-RTO: 0 /100
PHOSPHATE SERPL-MCNC: 2.5 MG/DL (ref 2.5–4.5)
PLATELET # BLD AUTO: 218 10E3/UL (ref 150–450)
POTASSIUM BLD-SCNC: 3.2 MMOL/L (ref 3.4–5.3)
PROT SERPL-MCNC: 6.6 G/DL (ref 6.8–8.8)
RBC # BLD AUTO: 3.06 10E6/UL (ref 4.4–5.9)
SODIUM SERPL-SCNC: 141 MMOL/L (ref 133–144)
WBC # BLD AUTO: 6.9 10E3/UL (ref 4–11)

## 2022-03-24 PROCEDURE — 250N000013 HC RX MED GY IP 250 OP 250 PS 637: Performed by: PHYSICIAN ASSISTANT

## 2022-03-24 PROCEDURE — 258N000003 HC RX IP 258 OP 636: Performed by: PHYSICIAN ASSISTANT

## 2022-03-24 PROCEDURE — 36591 DRAW BLOOD OFF VENOUS DEVICE: CPT

## 2022-03-24 PROCEDURE — 250N000011 HC RX IP 250 OP 636: Performed by: PHYSICIAN ASSISTANT

## 2022-03-24 PROCEDURE — 85004 AUTOMATED DIFF WBC COUNT: CPT | Performed by: PHYSICIAN ASSISTANT

## 2022-03-24 PROCEDURE — 80053 COMPREHEN METABOLIC PANEL: CPT | Performed by: PHYSICIAN ASSISTANT

## 2022-03-24 PROCEDURE — 84100 ASSAY OF PHOSPHORUS: CPT | Performed by: PHYSICIAN ASSISTANT

## 2022-03-24 PROCEDURE — 96360 HYDRATION IV INFUSION INIT: CPT

## 2022-03-24 PROCEDURE — 83735 ASSAY OF MAGNESIUM: CPT | Performed by: PHYSICIAN ASSISTANT

## 2022-03-24 RX ORDER — POTASSIUM CHLORIDE 1500 MG/1
20 TABLET, EXTENDED RELEASE ORAL DAILY
Qty: 30 TABLET | Refills: 1 | Status: SHIPPED | OUTPATIENT
Start: 2022-03-24 | End: 2022-01-01

## 2022-03-24 RX ORDER — HEPARIN SODIUM (PORCINE) LOCK FLUSH IV SOLN 100 UNIT/ML 100 UNIT/ML
5 SOLUTION INTRAVENOUS
Status: COMPLETED | OUTPATIENT
Start: 2022-03-24 | End: 2022-03-24

## 2022-03-24 RX ORDER — POTASSIUM CHLORIDE 1500 MG/1
40 TABLET, EXTENDED RELEASE ORAL ONCE
Status: COMPLETED | OUTPATIENT
Start: 2022-03-24 | End: 2022-03-24

## 2022-03-24 RX ORDER — HEPARIN SODIUM (PORCINE) LOCK FLUSH IV SOLN 100 UNIT/ML 100 UNIT/ML
5 SOLUTION INTRAVENOUS
Status: CANCELLED
Start: 2022-03-24

## 2022-03-24 RX ADMIN — Medication 5 ML: at 10:07

## 2022-03-24 RX ADMIN — SODIUM CHLORIDE 1000 ML: 9 INJECTION, SOLUTION INTRAVENOUS at 09:02

## 2022-03-24 RX ADMIN — POTASSIUM CHLORIDE 40 MEQ: 20 TABLET, EXTENDED RELEASE ORAL at 09:00

## 2022-03-24 NOTE — PROGRESS NOTES
[facility]   at the [facility]   Otolaryngology Clinic     Patient: Ifrah Huitron    MRN: [id number]    : 1950    Age/Gender: 71 year old male  Date of Service: 3/25/2022  Rendering Provider:   Sherry Esquivel MD     Chief Complaint   Left vocal fold paralysis   H/o MDL with left voice gel IL 21  H/o spindle cell carcinoma  Interval History   HISTORY OF PRESENT ILLNESS: I was asked to consult on Ifrah Huitron, by Dr. Kyree Bearden for evaluation of left vocal fold paralysis. Mr. Huitron is a 71 year old male who presents to us today with dysphonia.       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    Today, he presents for follow up. he reports:  - 80% of normal  - the playback - voice sounded better  - he has rasp and effort  - has thrust ahd has pain  - once a month he gets chemo and comes back  - swallow is good       PAST MEDICAL HISTORY:   Past Medical History:   Diagnosis Date     Arthritis      Mesothelioma, malignant (H) 2021     Spindle cell sarcoma (H) 2021     Thyroid disease     removed pituitary gland       PAST SURGICAL HISTORY:   Past Surgical History:   Procedure Laterality Date     COLONOSCOPY N/A 2020    Procedure: COLONOSCOPY;  Surgeon: Ken Camacho MD;  Location: [facility]     ENT SURGERY       HERNIA REPAIR       INSERT PORT VASCULAR ACCESS Right 2022    Procedure: INSERTION, VASCULAR ACCESS PORT;  Surgeon: Daniel Kinney MD;  [place] [facility] [place]     IR CHEST PORT PLACEMENT > 5 YRS OF AGE  2022     LARYNGOSCOPY, EXCISE VOCAL CORD LESION MICROSCOPIC, COMBINED Left 2021    Procedure: MICROLARYNGOSCOPY, LEFT TRUE VOCAL CORD INJECTION WITH PROLARYN;  Surgeon: Kyree Bearden MD;  Location: [facility]     PHACOEMULSIFICATION WITH STANDARD INTRAOCULAR LENS IMPLANT Right 03/10/2021    Procedure: Cataract removal with implant.;  Surgeon: Jamir Mac MD;  Location:  "[facility]     PHACOEMULSIFICATION WITH STANDARD INTRAOCULAR LENS IMPLANT Left 04/05/2021    Procedure: Cataract removal with implant.;  Surgeon: Jamir Mac MD;  Location: [facility]     PICC DOUBLE LUMEN PLACEMENT Right 12/01/2021    5FR DL PICC, basilic vein. L-38cm, 1cm out.     PICC DOUBLE LUMEN PLACEMENT Right 01/04/2022    Right cephalic, 41 cm, 1 external length     PITUITARY EXCISION       tooth pulled 4/7  Right        CURRENT MEDICATIONS:   Current Outpatient Medications:      cholecalciferol (VITAMIN D-1000 MAX ST) 1000 units TABS, Take 1,000 Units by mouth daily , Disp: , Rfl:      escitalopram (LEXAPRO) 10 MG tablet, Take 1 tablet (10 mg) by mouth daily, Disp: 30 tablet, Rfl: 1     guaiFENesin-codeine (GUAIFENESIN AC) 100-10 MG/5ML syrup, Take 10 mLs by mouth every 4 hours as needed for cough, Disp: 118 mL, Rfl: 0     hydrocortisone (CORTEF) 10 MG tablet, Take 20 mg in the morning and 10 mg in the afternoon, Disp: 270 tablet, Rfl: 3     Insulin Syringe-Needle U-100 27.5G X 5/8\" 2 ML MISC, 1 each daily as needed (with solucortef for adrenal crisis), Disp: 10 each, Rfl: 1     levothyroxine (SYNTHROID/LEVOTHROID) 75 MCG tablet, Take 1 tablet (75 mcg) by mouth every morning, Disp: 90 tablet, Rfl: 3     Menthol-Methyl Salicylate (DALIA MALIK GREASELESS) cream, Apply topically every 6 hours as needed, Disp: , Rfl:      metroNIDAZOLE (METROGEL) 1 % external gel, Apply topically daily .Try stronger dose due to increased symptoms after chemo., Disp: 30 g, Rfl: 0     omeprazole (PRILOSEC) 20 MG DR capsule, Take 2 capsules (40 mg) by mouth daily, Disp: 60 capsule, Rfl: 1     ondansetron (ZOFRAN) 4 MG tablet, Take 1-2 tablets (4-8 mg) by mouth every 8 hours as needed for nausea, Disp: 60 tablet, Rfl: 3     potassium chloride ER (KLOR-CON M) 20 MEQ CR tablet, Take 1 tablet (20 mEq) by mouth daily, Disp: 30 tablet, Rfl: 1     prochlorperazine (COMPAZINE) 5 MG tablet, Take 1 tablet (5 mg) by mouth every 6 hours as " needed for nausea or vomiting . Caution: causes sedation., Disp: 30 tablet, Rfl: 1     SUMAtriptan (IMITREX) 50 MG tablet, Take 1 tablet (50 mg) by mouth at onset of headache for migraine May repeat in 2 hours. Max 4 tablets/24 hours., Disp: 10 tablet, Rfl: 3     triamcinolone (KENALOG) 0.1 % external cream, Apply topically 2 times daily to bothersome skin areas., Disp: 30 g, Rfl: 3  No current facility-administered medications for this visit.    ALLERGIES: Penicillins    SOCIAL HISTORY:    Social History     Socioeconomic History     Marital status:      Spouse name: Not on file     Number of children: Not on file     Years of education: Not on file     Highest education level: Not on file   Occupational History     Not on file   Tobacco Use     Smoking status: Never Smoker     Smokeless tobacco: Never Used   Substance and Sexual Activity     Alcohol use: Yes     Comment: rare     Drug use: Never     Sexual activity: Not on file   Other Topics Concern     Parent/sibling w/ CABG, MI or angioplasty before 65F 55M? Not Asked   Social History Narrative     Not on file     Social Determinants of Health     Financial Resource Strain: Low Risk      Difficulty of Paying Living Expenses: Not very hard   Food Insecurity: No Food Insecurity     Worried About Running Out of Food in the Last Year: Never true     Ran Out of Food in the Last Year: Never true   Transportation Needs: No Transportation Needs     Lack of Transportation (Medical): No     Lack of Transportation (Non-Medical): No   Physical Activity: Inactive     Days of Exercise per Week: 0 days     Minutes of Exercise per Session: 0 min   Stress: Not on file   Social Connections: Moderately Integrated     Frequency of Communication with Friends and Family: Twice a week     Frequency of Social Gatherings with Friends and Family: Twice a week     Attends Alevism Services: Never     Active Member of Clubs or Organizations: Yes     Attends Club or Organization  Meetings: 1 to 4 times per year     Marital Status:    Intimate Partner Violence: Unknown     Fear of Current or Ex-Partner: No     Emotionally Abused: No     Physically Abused: Not on file     Sexually Abused: No   Housing Stability: Not on file         FAMILY HISTORY:   Family History   Problem Relation Age of Onset     Lupus Mother      ALS Father      Rheumatoid Arthritis Sister       Non-contributory for problems with anesthesia    REVIEW OF SYSTEMS:   The patient was asked a 14 point review of systems regarding constitutional symptoms, eye symptoms, ears, nose, mouth, throat symptoms, cardiovascular symptoms, respiratory symptoms, gastrointestinal symptoms, genitourinary symptoms, musculoskeletal symptoms, integumentary symptoms, neurological symptoms, psychiatric symptoms, endocrine symptoms, hematologic/lymphatic symptoms, and allergic/ immunologic symptoms.   The pertinent factors have been included in the HPI and below.  Patient Supplied Answers to Review of Systems  No flowsheet data found.    Physical Examination   The patient underwent a physical examination as described below. The pertinent positive and negative findings are summarized after the description of the examination.  Constitutional: The patient's developmental and nutritional status was assessed. The patient's voice quality was assessed.  Head and Face: The head and face were inspected for deformities. The sinuses were palpated. The salivary glands were palpated. Facial muscle strength was assessed bilaterally.  Eyes: Extraocular movements and primary gaze alignment were assessed.  Ears, Nose, Mouth and Throat: The ears and nose were examined for deformities. The nasal septum, mucosa, and turbinates were inspected by anterior rhinoscopy. The lips, teeth, and gums were examined for abnormalities. The oral mucosa, tongue, palate, tonsils, lateral and posterior pharynx were inspected for the presence of asymmetry or mucosal lesions.     Neck: The tracheal position was noted, and the neck mass palpated to determine if there were any asymmetries, abnormal neck masses, thyromegally, or thyroid nodules.  Respiratory: The nature of the breathing and chest expansion/symmetry was observed.  Cardiovascular: The patient was examined to determine the presence of any edema or jugular venous distension.  Abdomen: The contour of the abdomen was noted.  Lymphatic: The patient was examined for infraclavicular lymphadenopathy.  Musculoskeletal: The patient was inspected for the presence of skeletal deformities.  Extremities: The extremities were examined for any clubbing or cyanosis.  Skin: The skin was examined for inflammatory or neoplastic conditions.  Neurologic: The patient's orientation, mood, and affect were noted. The cranial nerve  functions were examined.  Other pertinent positive and negative findings on physical examination:   OC/OP: no lesions, uvula midline, soft palate elevates symmetrically   Neck: no lesions, bilateralTH tenderness to palpation  All other physical examination findings were within normal limits and noncontributory.    Procedures   Flexible laryngoscopy (CPT 57694)      Pre-procedure diagnosis: dysphonia  Post-procedure diagnosis: same as above  Indication for procedure: Mr. Huitron is a 71 year old male with see above  Procedure(s): Fiberoptic Laryngoscopy    Details of Procedure: After informed consent was obtained, the patient was seated in the examination chair.  The areas of the nasopharynx as well as the hypopharynx were anesthetized with topical 4% lidocaine with 0.25% phenylephrine atomizer.  Examination of the base of tongue was performed first.  Attention was directed to any evidence of masses in the area or evidence of leukoplakia or candidal infection.  Attention was directed to the epiglottis where its size and position was determined and its movement on phonation of the vowel  e .  The piriform sinuses were then  inspected for any mass lesions or pooling of secretions.  Attention was then directed to the larynx. The vocal folds were inspected for infection or any areas of leukoplakia, for masses, polypoid degeneration, or hemorrhage.  Having done this, the arytenoids and vocal processes were inspected for erythema or evidence of granuloma formation.  The posterior commissure was then inspected for evidence of inflammatory changes in the mucosa and heaping up of mucosal tissue. The patient was then instructed to say the vowel  e .  Adduction of vocal folds to the midline was observed for any evidence of paresis or paralysis of the larynx or asymmetry in rotation of the larynx to the left or right. The patient was asked to breathe and the degree of abduction was noted bilaterally.  Subglottic view of the larynx was obtained for any additional mass lesions or mucosal changes.  Finally the post cricoid was examined for evidence of pooling of secretions, as well as the pharyngeal wall mucosa.   Anesthesia type: 0.25% phenylephrine    Findings:  Anatomic/physiological deviations: left vocal fold paralysis, has good closure   Right vocal process: No restriction of mobility   Left vocal process: Marked restriction of mobility  Glottal gap: Complete glottal closure  Supraglottic structures: Normal  Hypopharynx: Normal     Estimated Blood Loss: minimal  Complications: None  Disposition: Patient tolerated the procedure well               Review of Relevant Clinical Data   I personally reviewed:  Notes:   Kyree Saunders 7/1/21  Dedo exposure, 1mL of voice gel injected     Dr. Kyree Bearden, 6/21/21      Radiology:      CT CHEST/ABDOMEN/PELVIS W CONTRAST 11/22/2021   mediastinal mass            Pathology: CT guided biopsy of anterior mediastinal mass 5/20/21  SPECIMEN(S):   Left pleural mass buopsy, CT guided     FINAL DIAGNOSIS:   Left pleural mass biopsy, CT guided:   - Malignant spindle cell neoplasm with necrosis (see comment)      COMMENT:   Special stains were performed with appropriate controls.  The tumor cells   are diffusely positive for CK   AE1/AE3 and CK MIRZA.  There is also focal to patchy staining for D2-40,   CD68, and WT-1.  INI1 is retained in   the tumor cells.  The Ki-67 labeling index is approximately 70%.  The   tumor cells show high PD-L1 expression   (TPS 90%).  No expression of P63, calretinin, DOG1, ALK1, , CK 5/6,   STAT6, SMA, SALL4, desmin, S100,   Myogenin, CD21, and CD23 is identified in the tumor cells.  These findings    together with tumor morphology and   location are most compatible with malignant sarcomatoid mesothelioma.  The    differential diagnosis includes   sarcomatoid carcinoma and sarcomas such as synovial sarcoma and   histiocytic sarcoma.  Additional tests are   pending and may help to classify this tumor.     No fungal or mycobacterial organisms are identified with GMS and Jessenia   stains.   Labs:  Lab Results   Component Value Date    TSH 0.21 (L) 02/04/2022     Lab Results   Component Value Date     03/24/2022    CO2 29 03/24/2022    BUN 18 03/24/2022    CREAT 0.7 06/21/2021    PHOS 2.5 03/24/2022     Lab Results   Component Value Date    WBC 6.9 03/24/2022    HGB 9.6 (L) 03/24/2022    HCT 30.0 (L) 03/24/2022    MCV 98 03/24/2022     03/24/2022     Lab Results   Component Value Date    PTT 36 01/04/2022    INR 1.12 01/04/2022     No results found for: OLAF  No components found for: RHEUMATOIDFACTOR,  RF  Lab Results   Component Value Date    CRP 26.9 (H) 11/08/2021     No components found for: CKTOT, URICACID  No components found for: C3, C4, DSDNAAB, NDNAABIFA  No results found for: MPOAB    Patient reported Quality of Life (QOL) Measures   Patient Supplied Answers To VHI Questionnaire  No flowsheet data found.      Patient Supplied Answers To EAT Questionnaire  No flowsheet data found.      Patient Supplied Answers To CSI Questionnaire  No flowsheet data  found.        Impression & Plan     IMPRESSION: Mr. Huitron is a 71 year old male who is being seen for the followin. Dysphonia  - in the setting spindle cell carcinoma, with mediastinal extent undergoing chemotherapy  - woke up one morning with laryngitis  - he reports this started around March   - but he also had a CT-guided lung biopsy on 2021 which he reports thinks affected his voice and is around the time when his voice changed  - voice demand is low now   - used to have more trouble talking to clients due to breathing, but this has improved in the past month  - had therapy which improved his breathing  - s/p MDL with voice gel on 21 by Dr. Bearden - though he reports no benefit in his voice after that  - however in the past months the voice has improved  - he can talk now with people without needing to catch his breathe   - however he is still bothered by the gravel sound in his voice  - MPT is 19s  - strobe 21 shows left vocal fold paralysis, bilateral anterior saccular cysts  - symptoms due to vocal fold paralysis and secondary muscle tension  - he does have good closure at this point, therefore would recommend optimization with voice therapy at this point  - will follow up in March when he is at 1 year from initial symptoms to determine if he is still symptomatic and would like further treatment at that point \  - has been doing therapy with Tasha Bowie, Ph.D., CCC/SLP   - has been doing chemo, has thrush which is preventing his progress  - symptoms today 3/25/22 are 80% of normal  - MPT is 24seconds  - scope 3/25/22 shows persistent left vocal fold paralysis with good closure  - he is 1 year out from initial changes - so will likely not come back   - discussed optimizing and finishing voice therapy when he has time  - yearly follow ups   Plan  - voice therapy   - follow up in 1 year     2. Dysphagia  - feels that food and drinks can go down the wrong tube  - occurs about 2-3x  per week  - has always had difficulties with this before radiation and chemotherapy treatments  - FEES 12/9/21 shows penetration with large sips of thins, left sided vallecular residue improves with liquid wash  - symptoms due to pharyngeal weakness  - discussed his precautions - small bites and liquid wash do help therefore will keep focusing on thsi  - would encourage peanut butter, eggs and ensure/boost for protein rather than meats and seeds  - today 3/25/22 swallow is stable  Plan  - swallow precautions  - if worsening in swallow - will need repeat check     RETURN VISIT: 1 year    Sherry Esquivel MD    Laryngology    [facility]  Department of  Otolaryngology - Head and Neck Surgery  Clinics & Surgery Center  [address] [place] [address]  Appointment line: [phone]  Fax: [phone]  [email or web address]

## 2022-03-24 NOTE — PROGRESS NOTES
PAC labs drawn via site protocol. Patient tolerated well.    Suzette Riley RN on 3/24/2022 at 8:35 AM

## 2022-03-24 NOTE — PROGRESS NOTES
Infusion Nursing Note:  Ifrah Huitron presents today for IVF.    Patient seen by provider today: No   present during visit today: Not Applicable.    Note: Per GERALDO Mullins, patient should have oral potassium replacement in clinic today and restart 20 mEq at home daily. Instructed patient of provider's recommendations, he verbalized understanding.      Intravenous Access:  Implanted Port.    Treatment Conditions:  Lab Results   Component Value Date     03/24/2022    POTASSIUM 3.2 (L) 03/24/2022    MAG 2.3 03/24/2022    CR 0.96 03/24/2022    COTY 9.1 03/24/2022    BILITOTAL 0.2 03/24/2022    ALBUMIN 3.2 (L) 03/24/2022    ALT 18 03/24/2022    AST 15 03/24/2022     Results reviewed, labs MET treatment parameters, ok to proceed with treatment.      Post Infusion Assessment:  Patient tolerated infusion without incident.  Blood return noted pre and post infusion.  Site patent and intact, free from redness, edema or discomfort.  No evidence of extravasations.  Access discontinued per protocol.       Discharge Plan:   Copy of AVS reviewed with patient and/or family.  Patient will return 3/31/22 for next appointment.  Patient discharged in stable condition accompanied by: self.  Departure Mode: Ambulatory.      Suzette Riley RN

## 2022-03-25 ENCOUNTER — OFFICE VISIT (OUTPATIENT)
Dept: OTOLARYNGOLOGY | Facility: CLINIC | Age: 72
End: 2022-03-25
Payer: MEDICARE

## 2022-03-25 ENCOUNTER — ALLIED HEALTH/NURSE VISIT (OUTPATIENT)
Dept: SPEECH THERAPY | Facility: CLINIC | Age: 72
End: 2022-03-25

## 2022-03-25 VITALS
BODY MASS INDEX: 22.87 KG/M2 | OXYGEN SATURATION: 97 % | HEART RATE: 78 BPM | SYSTOLIC BLOOD PRESSURE: 113 MMHG | WEIGHT: 146 LBS | DIASTOLIC BLOOD PRESSURE: 75 MMHG

## 2022-03-25 DIAGNOSIS — R49.0 DYSPHONIA: Primary | ICD-10-CM

## 2022-03-25 DIAGNOSIS — B37.0 THRUSH: Primary | ICD-10-CM

## 2022-03-25 DIAGNOSIS — J38.01 VOCAL FOLD PARALYSIS, LEFT: ICD-10-CM

## 2022-03-25 PROCEDURE — 99213 OFFICE O/P EST LOW 20 MIN: CPT | Mod: 25 | Performed by: OTOLARYNGOLOGY

## 2022-03-25 PROCEDURE — 92524 BEHAVRAL QUALIT ANALYS VOICE: CPT | Mod: GN | Performed by: SPEECH-LANGUAGE PATHOLOGIST

## 2022-03-25 PROCEDURE — 31575 DIAGNOSTIC LARYNGOSCOPY: CPT | Performed by: OTOLARYNGOLOGY

## 2022-03-25 RX ORDER — FLUCONAZOLE 200 MG/1
200 TABLET ORAL DAILY
Qty: 7 TABLET | Refills: 0 | Status: SHIPPED | OUTPATIENT
Start: 2022-03-25 | End: 2022-04-01

## 2022-03-25 ASSESSMENT — PAIN SCALES - GENERAL: PAINLEVEL: NO PAIN (0)

## 2022-03-25 NOTE — LETTER
"3/25/2022       RE: Ifrah Huitron  [address] [address] [facility] [address]     Dear Colleague,    Thank you for referring your patient, Ifrah Huitron, to the [facility] [place] [facility]. Please see a copy of my visit note below.    [facility]  Baldomero Boswell Jr., M.D., F.A.C.S.  Veronique Forbes M.D., M.P.H.  Sherry Esquivel M.D.  Tasha Bowie, Ph.D., CCC-SLP  Neil Smith, Ph.D., [facility]-SLP  Janie Madrigal M.M. (voice), M.A., CCC-SLP  Danilo Lu M.M. (voice), M.A., CCC-SLP  CHERELLE Kraft (voice), M.S., CCC-SLP    [facility]  VOICE/SPEECH/BREATHING THERAPY  CLINICAL FOLLOW-UP AND LARYNGEAL EXAMINATION REPORT    Patient: Ifrah Huitron  Date of Service: 3/25/2022    HISTORY  PATIENT INFORMATION  Ifrah Huitron was seen for follow-up in conjunction with a visit to Dr. Esquivel today.  Please also refer to Dr. Esquivel's dictation.  He was last seen on 1/24/22 for speech therapy.  Reevaluation is warranted today, as Mr. Huitron reports improvements, and we are assessing the voice in the context of a laryngeal examination.    PROGRESS SINCE LAST VISIT  Mr. Huitron reports:    Throat is an ongoing problem because of his chemotherapy; this results in soreness of the throat and mouth    He has not been doing his voice exercises because of the mouth and throat soreness    Voice continues to improve anyway, and is now even better than it was at his January visit  o \"I get along fine with what I've got\"  o Thinks his voice is 80% of normal; the remaining 20% is due to increased effort and a gritty voice quality  o He has less fatigue now; this is \"miles better\"    Swallowing is stable    In April he will have a 1 year history of vocal fold paralysis      OBJECTIVE FINDINGS  VOICE AND SPEECH EVALUATION  Mr. Huitron presents today with a voice quality that is characterized by     Consistent mild, gritty roughness    Consistent narrow " resonance    A sustained vowel at 125 Hz starts with some roughness and leads to vocal [place]    Habitual pitch is within normal limits and appropriate, in the range from 100 to 125 Hz    In a pitch glide, he can glide up to 275 Hz using falsetto or upper register; the quality at this pitch is mildly weak    Maximum phonation time is 24 seconds, even though he takes a poor inhalation, and the voice quality turns to glottal [place]    There is mild pain to palpation of the thyrohyoid area      LARYNGEAL EXAMINATION    Endoscopic laryngeal examination was performed by:  Dr. Esquivel  I provided the protocol of instructions for the patient.  Type of exam:   Flexible endoscopy with chip-tip technology     This exam shows:    Left vocal fold continues to be immobile near the midline    Movement of the right vocal fold is good, providing good glottic closure    There is some constriction of the ventricular folds in the anterior part of the supraglottic area    No fungus on the vocal folds today  Stroboscopy was not warranted.    Dr. Esquivel and I reviewed this laryngeal exam with Mr. Huitron today, and I provided pertinent explanations:    The left vocal fold continues to be immobile, but is near the midline, allowing for good glottic closure from the right vocal fold    Supraglottic hyperfunction is mild    It seems unlikely that Mr. 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      Based on today s laryngeal examination, it appears that:    The left vocal fold continues to be immobile, but the right vocal fold  provides reasonably good glottic closure.    I have recommended that Mr. Huitron try to do his morning warm up exercises, but he understands that the fungal infections make voice exercises difficult.    It is possible that functional therapy will be beneficial at some point in the future, but we agreed that now is not a good time, as the persistent fungal infections make exercising difficult.    I will also see Mr. Huitron as Dr. Esquivel deems appropriate.      TREATMENT PLAN   I will see Mr. Huitron for continued functional therapy when his fungal infections subside.  Although no therapy was performed today, we will keep the certification active, as we plan to continue therapy when he is able.     Certification period: March 10, 2022 - June 8, 2022      TOTAL SERVICE TIME: 34 minutes  EVALUATION OF VOICE AND RESONANCE (20362)  NO CHARGE FACILITY FEE       Tasha Bowie, Ph.D., [facility]SLP  Speech-Language Pathologist  Director, [facility]  [phone]                                                                                         Outpatient Speech Language Therapy Evaluation  PLAN OF TREATMENT FOR OUTPATIENT REHABILITATION  (RE-CERTIFICATION FOR SPEECH THERAPY)  Patient's Last Name, First Name, M.I.  YOB: 1950  Ifrah Huitron                        Provider's Name  Tasha Bowie, SLP Medical Record No.  [id number]                               Onset Date:  12/9/21   Start of Care Date: 12/9/21     Type: Speech Language Therapy Medical Diagnosis: Dysphonia                        Therapy Diagnosis:  Dysphonia   Visits from [facility]:  4   _________________________________________________________________________________  Plan of Treatment:   Speech therapy    Please see the plan of care from 12/9/21; goals have not changed.  _________________________________________________________________________________    I CERTIFY THE NEED FOR THESE SERVICES FURNISHED UNDER        THIS PLAN OF TREATMENT  AND WHILE UNDER MY CARE     (Physician attestation of this document indicates review and certification of the therapy plan).     Certification date from: 3/10/22  Certification date to: 6/8/22    Referring Provider: Sherry Esquivel MD      Again, thank you for allowing me to participate in the care of your patient.      Sincerely,    Tasha Bowie, SLP

## 2022-03-25 NOTE — LETTER
"3/25/2022      RE: Ifrah [facility]uitron  [address] [address] [facility] [address]       [facility]  Baldomero Boswell Jr., M.D., F.A.C.S.  Veronique Forbes M.D., M.P.H.  Sherry Esquivel M.D.  Tasha Bowie, Ph.D., CCC-SLP  Neil Smith, Ph.D., [facility]-SLP  Janie Madrigal M.M. (voice), M.NORMA., CCC-SLP  Danilo Lu M.M. (voice), M.NORMA., CCC-SLP  CHERELLE Kraft (voice), MChaparroS., [facility]  VOICE/SPEECH/BREATHING THERAPY  CLINICAL FOLLOW-UP AND LARYNGEAL EXAMINATION REPORT    Patient: Ifrah Huitron  Date of Service: 3/25/2022    HISTORY  PATIENT INFORMATION  Ifrah Huitron was seen for follow-up in conjunction with a visit to Dr. Esquivel today.  Please also refer to Dr. Esquivel's dictation.  He was last seen on 1/24/22 for speech therapy.  Reevaluation is warranted today, as Mr. Huitron reports improvements, and we are assessing the voice in the context of a laryngeal examination.    PROGRESS SINCE LAST VISIT  Mr. Huitron reports:    Throat is an ongoing problem because of his chemotherapy; this results in soreness of the throat and mouth    He has not been doing his voice exercises because of the mouth and throat soreness    Voice continues to improve anyway, and is now even better than it was at his January visit  o \"I get along fine with what I've got\"  o Thinks his voice is 80% of normal; the remaining 20% is due to increased effort and a gritty voice quality  o He has less fatigue now; this is \"miles better\"    Swallowing is stable    In April he will have a 1 year history of vocal fold paralysis      OBJECTIVE FINDINGS  VOICE AND SPEECH EVALUATION  Mr. Huitron presents today with a voice quality that is characterized by     Consistent mild, gritty roughness    Consistent narrow resonance    A sustained vowel at 125 Hz starts with some roughness and leads to vocal [place]    Habitual pitch is within normal limits and appropriate, in the range from 100 to 125 Hz    In a pitch glide, he can glide up to 275 Hz using " falsetto or upper register; the quality at this pitch is mildly weak    Maximum phonation time is 24 seconds, even though he takes a poor inhalation, and the voice quality turns to glottal [place]    There is mild pain to palpation of the thyrohyoid area      LARYNGEAL EXAMINATION    Endoscopic laryngeal examination was performed by:  Dr. Esquivel  I provided the protocol of instructions for the patient.  Type of exam:   Flexible endoscopy with chip-tip technology     This exam shows:    Left vocal fold continues to be immobile near the midline    Movement of the right vocal fold is good, providing good glottic closure    There is some constriction of the ventricular folds in the anterior part of the supraglottic area    No fungus on the vocal folds today  Stroboscopy was not warranted.    Dr. Esquivel and I reviewed this laryngeal exam with Mr. Huitron today, and I provided pertinent explanations:    The left vocal fold continues to be immobile, but is near the midline, allowing for good glottic closure from the right vocal fold    Supraglottic hyperfunction is mild    It seems unlikely that Mr. 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      Based on today s laryngeal examination, it appears that:    The left vocal fold continues to be immobile, but the right vocal fold provides reasonably good glottic closure.    I have recommended that Mr. Huitron try to do his morning warm up exercises, but he understands that the fungal infections make voice exercises difficult.    It is possible that functional  therapy will be beneficial at some point in the future, but we agreed that now is not a good time, as the persistent fungal infections make exercising difficult.    I will also see Mr. Huitron as Dr. Esquivel deems appropriate.      TREATMENT PLAN   I will see Mr. Huitron for continued functional therapy when his fungal infections subside.  Although no therapy was performed today, we will keep the certification active, as we plan to continue therapy when he is able.     Certification period: March 10, 2022 - June 8, 2022      TOTAL SERVICE TIME: 34 minutes  EVALUATION OF VOICE AND RESONANCE (94864)  NO CHARGE FACILITY FEE       Tasha Bowie, Ph.D., [facility]-SLP  Speech-Language Pathologist  Director, [facility]  [phone]                                                                                         Outpatient Speech Language Therapy Evaluation  PLAN OF TREATMENT FOR OUTPATIENT REHABILITATION  (RE-CERTIFICATION FOR SPEECH THERAPY)  Patient's Last Name, First Name, M.I.  YOB: 1950  Ifrah Huitron                        Provider's Name  Tasha Bowie, SLP Medical Record No.  [id number]                               Onset Date:  12/9/21   Start of Care Date: 12/9/21     Type: Speech Language Therapy Medical Diagnosis: Dysphonia                        Therapy Diagnosis:  Dysphonia   Visits from [facility]:  4   _________________________________________________________________________________  Plan of Treatment:   Speech therapy    Please see the plan of care from 12/9/21; goals have not changed.  _________________________________________________________________________________    I CERTIFY THE NEED FOR THESE SERVICES FURNISHED UNDER        THIS PLAN OF TREATMENT AND WHILE UNDER MY CARE     (Physician attestation of this document indicates review and certification of the therapy plan).     Certification date from: 3/10/22  Certification date to: 6/8/22    Referring Provider: Sherry Esquivel,  MD    Physician Attestation   I agree with the information in this note.    Sherry Bowie, SLP

## 2022-03-25 NOTE — PROGRESS NOTES
SLP: Pt seen briefly in conjunction with his ENT Clinic visit.  A clinical swallow evaluation was completed in December 2021 which revealed mild oropharyngeal dysphagia and recommended pt continue with regular textures and thin liquids.  At this time, pt feels like his swallow is stable.  Pt denies dysphagia to liquids and solids but does report ongoing thrush, poor taste and poor texture which he relates to his chemo.  Pt endorses difficulty with some pills but notes that he is able to compensate by taking them with his high protein smoothies.  Should this no longer be effective, pt educated on taking pills with purees/pudding/yogurt.  Pt reported understanding and agreement.      Total time: 12 minutes   No billable services provided.

## 2022-03-25 NOTE — PATIENT INSTRUCTIONS
1.  You were seen in the ENT Clinic today by . If you have any questions or concerns after your appointment, please call [phone]. Press option #1 for scheduling related needs. Press option #3 for Nurse advice.    2.   has recommended  the following:   - voice therapy   - swallow precautions, if worsening will need recheck in clinic    3.  Plan is to return to clinic in 1 year      Gertrudis Kurtz LPN  [phone]  [facility] - Otolaryngology

## 2022-03-25 NOTE — NURSING NOTE
Chief Complaint   Patient presents with     RECHECK     Follow up       Blood pressure 113/75, pulse 78, weight 66.2 kg (146 lb), SpO2 97 %.    Doug Omalley, EMT

## 2022-03-25 NOTE — LETTER
3/25/2022       RE: Ifrah Huitron  [id number] [address] [facility] [address]     Dear Colleague,    Thank you for referring your patient, Ifrah Huitron, to the [facility] EAR NOSE AND THROAT CLINIC [place] at [facility]. Please see a copy of my visit note below.        [facility]   at the [facility]   Otolaryngology Clinic     Patient: Ifrah Huitron    MRN: [id number]    : 1950    Age/Gender: 71 year old male  Date of Service: 3/25/2022  Rendering Provider:   Sherry Esquivel MD     Chief Complaint   Left vocal fold paralysis   H/o MDL with left voice gel IL 21  H/o spindle cell carcinoma  Interval History   HISTORY OF PRESENT ILLNESS: I was asked to consult on Ifrah Huitron, by Dr. Kyree Bearden for evaluation of left vocal fold paralysis. Mr. Huitron is a 71 year old male who presents to us today with dysphonia.       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    Today, he presents for follow up. he reports:  - 80% of normal  - the playback - voice sounded better  - he has rasp and effort  - has thrust ahd has pain  - once a month he gets chemo and comes back  - swallow is good       PAST MEDICAL HISTORY:   Past Medical History:   Diagnosis Date     Arthritis      Mesothelioma, malignant (H) 2021     Spindle cell sarcoma (H) 2021     Thyroid disease     removed pituitary gland       PAST SURGICAL HISTORY:   Past Surgical History:   Procedure Laterality Date     COLONOSCOPY N/A 2020    Procedure: COLONOSCOPY;  Surgeon: Ken Camacho MD;  Location: [facility]     ENT SURGERY       HERNIA REPAIR       INSERT PORT VASCULAR ACCESS Right 2022    Procedure: INSERTION, VASCULAR ACCESS PORT;  Surgeon: Daniel Kinney MD;  [place] [facility]     IR CHEST PORT PLACEMENT > 5 YRS OF AGE  2022     LARYNGOSCOPY, EXCISE VOCAL CORD LESION MICROSCOPIC, COMBINED  "Left 07/01/2021    Procedure: MICROLARYNGOSCOPY, LEFT TRUE VOCAL CORD INJECTION WITH PROLARYN;  Surgeon: Kyree Bearden MD;  Location: [facility]     PHACOEMULSIFICATION WITH STANDARD INTRAOCULAR LENS IMPLANT Right 03/10/2021    Procedure: Cataract removal with implant.;  Surgeon: Jamir Mac MD;  Location: [facility]     PHACOEMULSIFICATION WITH STANDARD INTRAOCULAR LENS IMPLANT Left 04/05/2021    Procedure: Cataract removal with implant.;  Surgeon: Jamir Mac MD;  Location: [facility]     PICC DOUBLE LUMEN PLACEMENT Right 12/01/2021    5FR DL PICC, basilic vein. L-38cm, 1cm out.     PICC DOUBLE LUMEN PLACEMENT Right 01/04/2022    Right cephalic, 41 cm, 1 external length     PITUITARY EXCISION       tooth pulled 4/7  Right        CURRENT MEDICATIONS:   Current Outpatient Medications:      cholecalciferol (VITAMIN D-1000 MAX ST) 1000 units TABS, Take 1,000 Units by mouth daily , Disp: , Rfl:      escitalopram (LEXAPRO) 10 MG tablet, Take 1 tablet (10 mg) by mouth daily, Disp: 30 tablet, Rfl: 1     guaiFENesin-codeine (GUAIFENESIN AC) 100-10 MG/5ML syrup, Take 10 mLs by mouth every 4 hours as needed for cough, Disp: 118 mL, Rfl: 0     hydrocortisone (CORTEF) 10 MG tablet, Take 20 mg in the morning and 10 mg in the afternoon, Disp: 270 tablet, Rfl: 3     Insulin Syringe-Needle U-100 27.5G X 5/8\" 2 ML MISC, 1 each daily as needed (with solucortef for adrenal crisis), Disp: 10 each, Rfl: 1     levothyroxine (SYNTHROID/LEVOTHROID) 75 MCG tablet, Take 1 tablet (75 mcg) by mouth every morning, Disp: 90 tablet, Rfl: 3     Menthol-Methyl Salicylate (DALIA MALIK GREASELESS) cream, Apply topically every 6 hours as needed, Disp: , Rfl:      metroNIDAZOLE (METROGEL) 1 % external gel, Apply topically daily .Try stronger dose due to increased symptoms after chemo., Disp: 30 g, Rfl: 0     omeprazole (PRILOSEC) 20 MG DR capsule, Take 2 capsules (40 mg) by mouth daily, Disp: 60 capsule, Rfl: 1     ondansetron (ZOFRAN) 4 MG " tablet, Take 1-2 tablets (4-8 mg) by mouth every 8 hours as needed for nausea, Disp: 60 tablet, Rfl: 3     potassium chloride ER (KLOR-CON M) 20 MEQ CR tablet, Take 1 tablet (20 mEq) by mouth daily, Disp: 30 tablet, Rfl: 1     prochlorperazine (COMPAZINE) 5 MG tablet, Take 1 tablet (5 mg) by mouth every 6 hours as needed for nausea or vomiting . Caution: causes sedation., Disp: 30 tablet, Rfl: 1     SUMAtriptan (IMITREX) 50 MG tablet, Take 1 tablet (50 mg) by mouth at onset of headache for migraine May repeat in 2 hours. Max 4 tablets/24 hours., Disp: 10 tablet, Rfl: 3     triamcinolone (KENALOG) 0.1 % external cream, Apply topically 2 times daily to bothersome skin areas., Disp: 30 g, Rfl: 3  No current facility-administered medications for this visit.    ALLERGIES: Penicillins    SOCIAL HISTORY:    Social History     Socioeconomic History     Marital status:      Spouse name: Not on file     Number of children: Not on file     Years of education: Not on file     Highest education level: Not on file   Occupational History     Not on file   Tobacco Use     Smoking status: Never Smoker     Smokeless tobacco: Never Used   Substance and Sexual Activity     Alcohol use: Yes     Comment: rare     Drug use: Never     Sexual activity: Not on file   Other Topics Concern     Parent/sibling w/ CABG, MI or angioplasty before 65F 55M? Not Asked   Social History Narrative     Not on file     Social Determinants of Health     Financial Resource Strain: Low Risk      Difficulty of Paying Living Expenses: Not very hard   Food Insecurity: No Food Insecurity     Worried About Running Out of Food in the Last Year: Never true     Ran Out of Food in the Last Year: Never true   Transportation Needs: No Transportation Needs     Lack of Transportation (Medical): No     Lack of Transportation (Non-Medical): No   Physical Activity: Inactive     Days of Exercise per Week: 0 days     Minutes of Exercise per Session: 0 min   Stress: Not  on file   Social Connections: Moderately Integrated     Frequency of Communication with Friends and Family: Twice a week     Frequency of Social Gatherings with Friends and Family: Twice a week     Attends Zoroastrian Services: Never     Active Member of Clubs or Organizations: Yes     Attends Club or Organization Meetings: 1 to 4 times per year     Marital Status:    Intimate Partner Violence: Unknown     Fear of Current or Ex-Partner: No     Emotionally Abused: No     Physically Abused: Not on file     Sexually Abused: No   Housing Stability: Not on file         FAMILY HISTORY:   Family History   Problem Relation Age of Onset     Lupus Mother      ALS Father      Rheumatoid Arthritis Sister       Non-contributory for problems with anesthesia    REVIEW OF SYSTEMS:   The patient was asked a 14 point review of systems regarding constitutional symptoms, eye symptoms, ears, nose, mouth, throat symptoms, cardiovascular symptoms, respiratory symptoms, gastrointestinal symptoms, genitourinary symptoms, musculoskeletal symptoms, integumentary symptoms, neurological symptoms, psychiatric symptoms, endocrine symptoms, hematologic/lymphatic symptoms, and allergic/ immunologic symptoms.   The pertinent factors have been included in the HPI and below.  Patient Supplied Answers to Review of Systems  No flowsheet data found.    Physical Examination   The patient underwent a physical examination as described below. The pertinent positive and negative findings are summarized after the description of the examination.  Constitutional: The patient's developmental and nutritional status was assessed. The patient's voice quality was assessed.  Head and Face: The head and face were inspected for deformities. The sinuses were palpated. The salivary glands were palpated. Facial muscle strength was assessed bilaterally.  Eyes: Extraocular movements and primary gaze alignment were assessed.  Ears, Nose, Mouth and Throat: The ears and  nose were examined for deformities. The nasal septum, mucosa, and turbinates were inspected by anterior rhinoscopy. The lips, teeth, and gums were examined for abnormalities. The oral mucosa, tongue, palate, tonsils, lateral and posterior pharynx were inspected for the presence of asymmetry or mucosal lesions.    Neck: The tracheal position was noted, and the neck mass palpated to determine if there were any asymmetries, abnormal neck masses, thyromegally, or thyroid nodules.  Respiratory: The nature of the breathing and chest expansion/symmetry was observed.  Cardiovascular: The patient was examined to determine the presence of any edema or jugular venous distension.  Abdomen: The contour of the abdomen was noted.  Lymphatic: The patient was examined for infraclavicular lymphadenopathy.  Musculoskeletal: The patient was inspected for the presence of skeletal deformities.  Extremities: The extremities were examined for any clubbing or cyanosis.  Skin: The skin was examined for inflammatory or neoplastic conditions.  Neurologic: The patient's orientation, mood, and affect were noted. The cranial nerve  functions were examined.  Other pertinent positive and negative findings on physical examination:   OC/OP: no lesions, uvula midline, soft palate elevates symmetrically   Neck: no lesions, bilateralTH tenderness to palpation  All other physical examination findings were within normal limits and noncontributory.    Procedures   Flexible laryngoscopy (CPT 30513)      Pre-procedure diagnosis: dysphonia  Post-procedure diagnosis: same as above  Indication for procedure: Mr. Huitron is a 71 year old male with see above  Procedure(s): Fiberoptic Laryngoscopy    Details of Procedure: After informed consent was obtained, the patient was seated in the examination chair.  The areas of the nasopharynx as well as the hypopharynx were anesthetized with topical 4% lidocaine with 0.25% phenylephrine atomizer.  Examination of the base  of tongue was performed first.  Attention was directed to any evidence of masses in the area or evidence of leukoplakia or candidal infection.  Attention was directed to the epiglottis where its size and position was determined and its movement on phonation of the vowel  e .  The piriform sinuses were then inspected for any mass lesions or pooling of secretions.  Attention was then directed to the larynx. The vocal folds were inspected for infection or any areas of leukoplakia, for masses, polypoid degeneration, or hemorrhage.  Having done this, the arytenoids and vocal processes were inspected for erythema or evidence of granuloma formation.  The posterior commissure was then inspected for evidence of inflammatory changes in the mucosa and heaping up of mucosal tissue. The patient was then instructed to say the vowel  e .  Adduction of vocal folds to the midline was observed for any evidence of paresis or paralysis of the larynx or asymmetry in rotation of the larynx to the left or right. The patient was asked to breathe and the degree of abduction was noted bilaterally.  Subglottic view of the larynx was obtained for any additional mass lesions or mucosal changes.  Finally the post cricoid was examined for evidence of pooling of secretions, as well as the pharyngeal wall mucosa.   Anesthesia type: 0.25% phenylephrine    Findings:  Anatomic/physiological deviations: left vocal fold paralysis, has good closure   Right vocal process: No restriction of mobility   Left vocal process: Marked restriction of mobility  Glottal gap: Complete glottal closure  Supraglottic structures: Normal  Hypopharynx: Normal     Estimated Blood Loss: minimal  Complications: None  Disposition: Patient tolerated the procedure well               Review of Relevant Clinical Data   I personally reviewed:  Notes:   Op Kyree Stevenson 7/1/21  Dedo exposure, 1mL of voice gel injected     Dr. Kyree Bearden, 6/21/21      Radiology:      CT  CHEST/ABDOMEN/PELVIS W CONTRAST 11/22/2021   mediastinal mass            Pathology: CT guided biopsy of anterior mediastinal mass 5/20/21  SPECIMEN(S):   Left pleural mass buopsy, CT guided     FINAL DIAGNOSIS:   Left pleural mass biopsy, CT guided:   - Malignant spindle cell neoplasm with necrosis (see comment)     COMMENT:   Special stains were performed with appropriate controls.  The tumor cells   are diffusely positive for CK   AE1/AE3 and CK MIRZA.  There is also focal to patchy staining for D2-40,   CD68, and WT-1.  INI1 is retained in   the tumor cells.  The Ki-67 labeling index is approximately 70%.  The   tumor cells show high PD-L1 expression   (TPS 90%).  No expression of P63, calretinin, DOG1, ALK1, , CK 5/6,   STAT6, SMA, SALL4, desmin, S100,   Myogenin, CD21, and CD23 is identified in the tumor cells.  These findings    together with tumor morphology and   location are most compatible with malignant sarcomatoid mesothelioma.  The    differential diagnosis includes   sarcomatoid carcinoma and sarcomas such as synovial sarcoma and   histiocytic sarcoma.  Additional tests are   pending and may help to classify this tumor.     No fungal or mycobacterial organisms are identified with GMS and Jessenia   stains.   Labs:  Lab Results   Component Value Date    TSH 0.21 (L) 02/04/2022     Lab Results   Component Value Date     03/24/2022    CO2 29 03/24/2022    BUN 18 03/24/2022    CREAT 0.7 06/21/2021    PHOS 2.5 03/24/2022     Lab Results   Component Value Date    WBC 6.9 03/24/2022    HGB 9.6 (L) 03/24/2022    HCT 30.0 (L) 03/24/2022    MCV 98 03/24/2022     03/24/2022     Lab Results   Component Value Date    PTT 36 01/04/2022    INR 1.12 01/04/2022     No results found for: OLAF  No components found for: RHEUMATOIDFACTOR,  RF  Lab Results   Component Value Date    CRP 26.9 (H) 11/08/2021     No components found for: CKTOT, URICACID  No components found for: C3, C4, DSDNAAB, NDNAABIFA  No  results found for: MPOAB    Patient reported Quality of Life (QOL) Measures   Patient Supplied Answers To VHI Questionnaire  No flowsheet data found.      Patient Supplied Answers To EAT Questionnaire  No flowsheet data found.      Patient Supplied Answers To CSI Questionnaire  No flowsheet data found.        Impression & Plan     IMPRESSION: Mr. Huitron is a 71 year old male who is being seen for the followin. Dysphonia  - in the setting spindle cell carcinoma, with mediastinal extent undergoing chemotherapy  - woke up one morning with laryngitis  - he reports this started around March   - but he also had a CT-guided lung biopsy on 2021 which he reports thinks affected his voice and is around the time when his voice changed  - voice demand is low now   - used to have more trouble talking to clients due to breathing, but this has improved in the past month  - had therapy which improved his breathing  - s/p MDL with voice gel on 21 by Dr. Bearden - though he reports no benefit in his voice after that  - however in the past months the voice has improved  - he can talk now with people without needing to catch his breathe   - however he is still bothered by the gravel sound in his voice  - MPT is 19s  - strobe 21 shows left vocal fold paralysis, bilateral anterior saccular cysts  - symptoms due to vocal fold paralysis and secondary muscle tension  - he does have good closure at this point, therefore would recommend optimization with voice therapy at this point  - will follow up in March when he is at 1 year from initial symptoms to determine if he is still symptomatic and would like further treatment at that point \  - has been doing therapy with Tasha Bowie, Ph.D., CCC/SLP   - has been doing chemo, has thrush which is preventing his progress  - symptoms today 3/25/22 are 80% of normal  - MPT is 24seconds  - scope 3/25/22 shows persistent left vocal fold paralysis with good closure  - he is 1  year out from initial changes - so will likely not come back   - discussed optimizing and finishing voice therapy when he has time  - yearly follow ups   Plan  - voice therapy   - follow up in 1 year     2. Dysphagia  - feels that food and drinks can go down the wrong tube  - occurs about 2-3x per week  - has always had difficulties with this before radiation and chemotherapy treatments  - FEES 12/9/21 shows penetration with large sips of thins, left sided vallecular residue improves with liquid wash  - symptoms due to pharyngeal weakness  - discussed his precautions - small bites and liquid wash do help therefore will keep focusing on thsi  - would encourage peanut butter, eggs and ensure/boost for protein rather than meats and seeds  - today 3/25/22 swallow is stable  Plan  - swallow precautions  - if worsening in swallow - will need repeat check     RETURN VISIT: 1 year    Sherry Esquivel MD    Laryngology    [facility] Voice [facility]  Department of  Otolaryngology - Head and Neck Surgery  Clinics & Surgery Center  [address] [place] [id number]  Appointment line: [phone]  Fax: [phone]  [email or web address]

## 2022-03-31 ENCOUNTER — LAB (OUTPATIENT)
Dept: INFUSION THERAPY | Facility: CLINIC | Age: 72
End: 2022-03-31
Attending: PHYSICIAN ASSISTANT
Payer: MEDICARE

## 2022-03-31 DIAGNOSIS — Z51.89 ENCOUNTER FOR OTHER SPECIFIED AFTERCARE: ICD-10-CM

## 2022-03-31 DIAGNOSIS — T45.1X5A CHEMOTHERAPY-INDUCED NEUTROPENIA (H): ICD-10-CM

## 2022-03-31 DIAGNOSIS — C49.9 SARCOMA (H): Primary | ICD-10-CM

## 2022-03-31 DIAGNOSIS — C45.9 MESOTHELIOMA, MALIGNANT (H): ICD-10-CM

## 2022-03-31 DIAGNOSIS — C49.9 SPINDLE CELL SARCOMA (H): ICD-10-CM

## 2022-03-31 DIAGNOSIS — D70.1 CHEMOTHERAPY-INDUCED NEUTROPENIA (H): ICD-10-CM

## 2022-03-31 DIAGNOSIS — R11.0 CHEMOTHERAPY-INDUCED NAUSEA: ICD-10-CM

## 2022-03-31 DIAGNOSIS — T45.1X5A CHEMOTHERAPY-INDUCED NAUSEA: ICD-10-CM

## 2022-03-31 LAB
ALBUMIN SERPL-MCNC: 3.5 G/DL (ref 3.4–5)
ALP SERPL-CCNC: 131 U/L (ref 40–150)
ALT SERPL W P-5'-P-CCNC: 21 U/L (ref 0–70)
ANION GAP SERPL CALCULATED.3IONS-SCNC: 5 MMOL/L (ref 3–14)
AST SERPL W P-5'-P-CCNC: 18 U/L (ref 0–45)
BASOPHILS # BLD AUTO: 0 10E3/UL (ref 0–0.2)
BASOPHILS NFR BLD AUTO: 1 %
BILIRUB SERPL-MCNC: 0.3 MG/DL (ref 0.2–1.3)
BUN SERPL-MCNC: 17 MG/DL (ref 7–30)
CALCIUM SERPL-MCNC: 9.2 MG/DL (ref 8.5–10.1)
CHLORIDE BLD-SCNC: 107 MMOL/L (ref 94–109)
CO2 SERPL-SCNC: 30 MMOL/L (ref 20–32)
CREAT SERPL-MCNC: 1.11 MG/DL (ref 0.66–1.25)
EOSINOPHIL # BLD AUTO: 0.2 10E3/UL (ref 0–0.7)
EOSINOPHIL NFR BLD AUTO: 3 %
ERYTHROCYTE [DISTWIDTH] IN BLOOD BY AUTOMATED COUNT: 16.8 % (ref 10–15)
GFR SERPL CREATININE-BSD FRML MDRD: 70 ML/MIN/1.73M2
GLUCOSE BLD-MCNC: 112 MG/DL (ref 70–99)
HCT VFR BLD AUTO: 31.2 % (ref 40–53)
HGB BLD-MCNC: 10 G/DL (ref 13.3–17.7)
IMM GRANULOCYTES # BLD: 0 10E3/UL
IMM GRANULOCYTES NFR BLD: 0 %
LYMPHOCYTES # BLD AUTO: 1.3 10E3/UL (ref 0.8–5.3)
LYMPHOCYTES NFR BLD AUTO: 19 %
MAGNESIUM SERPL-MCNC: 2.3 MG/DL (ref 1.6–2.3)
MCH RBC QN AUTO: 31.4 PG (ref 26.5–33)
MCHC RBC AUTO-ENTMCNC: 32.1 G/DL (ref 31.5–36.5)
MCV RBC AUTO: 98 FL (ref 78–100)
MONOCYTES # BLD AUTO: 0.7 10E3/UL (ref 0–1.3)
MONOCYTES NFR BLD AUTO: 11 %
NEUTROPHILS # BLD AUTO: 4.3 10E3/UL (ref 1.6–8.3)
NEUTROPHILS NFR BLD AUTO: 66 %
NRBC # BLD AUTO: 0 10E3/UL
NRBC BLD AUTO-RTO: 0 /100
PHOSPHATE SERPL-MCNC: 2.3 MG/DL (ref 2.5–4.5)
PLATELET # BLD AUTO: 199 10E3/UL (ref 150–450)
POTASSIUM BLD-SCNC: 3.4 MMOL/L (ref 3.4–5.3)
PROT SERPL-MCNC: 7 G/DL (ref 6.8–8.8)
RBC # BLD AUTO: 3.18 10E6/UL (ref 4.4–5.9)
SODIUM SERPL-SCNC: 142 MMOL/L (ref 133–144)
WBC # BLD AUTO: 6.5 10E3/UL (ref 4–11)

## 2022-03-31 PROCEDURE — 84100 ASSAY OF PHOSPHORUS: CPT | Performed by: PHYSICIAN ASSISTANT

## 2022-03-31 PROCEDURE — 36591 DRAW BLOOD OFF VENOUS DEVICE: CPT

## 2022-03-31 PROCEDURE — 258N000003 HC RX IP 258 OP 636: Performed by: PHYSICIAN ASSISTANT

## 2022-03-31 PROCEDURE — 250N000011 HC RX IP 250 OP 636: Performed by: PHYSICIAN ASSISTANT

## 2022-03-31 PROCEDURE — 83735 ASSAY OF MAGNESIUM: CPT | Performed by: PHYSICIAN ASSISTANT

## 2022-03-31 PROCEDURE — 80053 COMPREHEN METABOLIC PANEL: CPT | Performed by: PHYSICIAN ASSISTANT

## 2022-03-31 PROCEDURE — 96360 HYDRATION IV INFUSION INIT: CPT

## 2022-03-31 PROCEDURE — 85025 COMPLETE CBC W/AUTO DIFF WBC: CPT | Performed by: PHYSICIAN ASSISTANT

## 2022-03-31 RX ORDER — HEPARIN SODIUM (PORCINE) LOCK FLUSH IV SOLN 100 UNIT/ML 100 UNIT/ML
5 SOLUTION INTRAVENOUS
Status: COMPLETED | OUTPATIENT
Start: 2022-03-31 | End: 2022-03-31

## 2022-03-31 RX ORDER — HEPARIN SODIUM (PORCINE) LOCK FLUSH IV SOLN 100 UNIT/ML 100 UNIT/ML
5 SOLUTION INTRAVENOUS
Status: CANCELLED
Start: 2022-03-31

## 2022-03-31 RX ADMIN — Medication 5 ML: at 16:02

## 2022-03-31 RX ADMIN — SODIUM CHLORIDE 1000 ML: 9 INJECTION, SOLUTION INTRAVENOUS at 15:00

## 2022-03-31 NOTE — PROGRESS NOTES
PAC labs drawn without difficulty via site protocol. Patient tolerated well.    Suzette Riley RN on 3/31/2022 at 2:14 PM

## 2022-03-31 NOTE — PROGRESS NOTES
Infusion Nursing Note:  Ifrah Huitron presents today for IVF.    Patient seen by provider today: No   present during visit today: Not Applicable.    Note: N/A.      Intravenous Access:  Peripheral IV placed.    Treatment Conditions:  Lab Results   Component Value Date     03/31/2022    POTASSIUM 3.4 03/31/2022    MAG 2.3 03/31/2022    CR 1.11 03/31/2022    COTY 9.2 03/31/2022    BILITOTAL 0.3 03/31/2022    ALBUMIN 3.5 03/31/2022    ALT 21 03/31/2022    AST 18 03/31/2022     Results reviewed, labs MET treatment parameters, ok to proceed with treatment.      Post Infusion Assessment:  Patient tolerated infusion without incident.  Blood return noted pre and post infusion.  Site patent and intact, free from redness, edema or discomfort.  No evidence of extravasations.  Access discontinued per protocol.       Discharge Plan:   Copy of AVS reviewed with patient and/or family.  Patient will return 4/7/22 for next appointment.  Patient discharged in stable condition accompanied by: self.  Departure Mode: Ambulatory.      Suzette Riley RN

## 2022-03-31 NOTE — PROGRESS NOTES
"[facility] VOICE [facility]  Baldomero Boswell Jr., M.D., F.A.C.S.  Veronique Forbes M.D., M.P.H.  Sherry Esquivel M.D.  Tasha Bowie, Ph.D., CCC-SLP  Neil Smiht, Ph.D., [facility]SLP  Janie Madrigal M.M. (voice), M.A., CCC-SLP  Danilo Lu M.M. (voice), M.A., CCC-SLP  CHERELLE Kraft (voice), M.S., CCC-SLP    [facility]  VOICE/SPEECH/BREATHING THERAPY  CLINICAL FOLLOW-UP AND LARYNGEAL EXAMINATION REPORT    Patient: Ifrah Huitron  Date of Service: 3/25/2022    HISTORY  PATIENT INFORMATION  Ifrah Huitron was seen for follow-up in conjunction with a visit to Dr. Esquivel today.  Please also refer to Dr. Esquivel's dictation.  He was last seen on 1/24/22 for speech therapy.  Reevaluation is warranted today, as Mr. Huitron reports improvements, and we are assessing the voice in the context of a laryngeal examination.    PROGRESS SINCE LAST VISIT  Mr. Huitron reports:    Throat is an ongoing problem because of his chemotherapy; this results in soreness of the throat and mouth    He has not been doing his voice exercises because of the mouth and throat soreness    Voice continues to improve anyway, and is now even better than it was at his January visit  o \"I get along fine with what I've got\"  o Thinks his voice is 80% of normal; the remaining 20% is due to increased effort and a gritty voice quality  o He has less fatigue now; this is \"miles better\"    Swallowing is stable    In April he will have a 1 year history of vocal fold paralysis      OBJECTIVE FINDINGS  VOICE AND SPEECH EVALUATION  Mr. Huitron presents today with a voice quality that is characterized by     Consistent mild, gritty roughness    Consistent narrow resonance    A sustained vowel at 125 Hz starts with some roughness and leads to vocal [place]    Habitual pitch is within normal limits and appropriate, in the range from 100 to 125 Hz    In a pitch glide, he can glide up to 275 Hz using falsetto or upper register; the quality at this pitch is mildly weak    Maximum " phonation time is 24 seconds, even though he takes a poor inhalation, and the voice quality turns to glottal [place]    There is mild pain to palpation of the thyrohyoid area      LARYNGEAL EXAMINATION    Endoscopic laryngeal examination was performed by:  Dr. Esquivel  I provided the protocol of instructions for the patient.  Type of exam:   Flexible endoscopy with chip-tip technology     This exam shows:    Left vocal fold continues to be immobile near the midline    Movement of the right vocal fold is good, providing good glottic closure    There is some constriction of the ventricular folds in the anterior part of the supraglottic area    No fungus on the vocal folds today  Stroboscopy was not warranted.    Dr. Esquivel and I reviewed this laryngeal exam with Mr. Huitron today, and I provided pertinent explanations:    The left vocal fold continues to be immobile, but is near the midline, allowing for good glottic closure from the right vocal fold    Supraglottic hyperfunction is mild    It seems unlikely that Mr. 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      Based on today s laryngeal examination, it appears that:    The left vocal fold continues to be immobile, but the right vocal fold provides reasonably good glottic closure.    I have recommended that Mr. Huitron try to do his morning warm up exercises, but he understands that the fungal infections make voice exercises difficult.    It is possible that functional therapy will be beneficial at some point in the future, but we agreed that now is  not a good time, as the persistent fungal infections make exercising difficult.    I will also see Mr. Huitron as Dr. Esquivel deems appropriate.      TREATMENT PLAN   I will see Mr. Huitron for continued functional therapy when his fungal infections subside.  Although no therapy was performed today, we will keep the certification active, as we plan to continue therapy when he is able.     Certification period: March 10, 2022 - June 8, 2022      TOTAL SERVICE TIME: 34 minutes  EVALUATION OF VOICE AND RESONANCE (25425)  NO CHARGE FACILITY FEE       Tasha Bowie, Ph.D., [facility]-SLP  Speech-Language Pathologist  Director, [facility]  [phone]

## 2022-04-01 NOTE — PROGRESS NOTES
Outpatient Speech Language Therapy Evaluation  PLAN OF TREATMENT FOR OUTPATIENT REHABILITATION  (RE-CERTIFICATION FOR SPEECH THERAPY)  Patient's Last Name, First Name, M.I.  YOB: 1950  Ifrah Huitron                        Provider's Name  Tasha Bowie, SLP Medical Record No.  [id number]                               Onset Date:  12/9/21   Start of Care Date: 12/9/21     Type: Speech Language Therapy Medical Diagnosis: Dysphonia                        Therapy Diagnosis:  Dysphonia   Visits from [facility]:  4   _________________________________________________________________________________  Plan of Treatment:   Speech therapy    Please see the plan of care from 12/9/21; goals have not changed.  _________________________________________________________________________________    I CERTIFY THE NEED FOR THESE SERVICES FURNISHED UNDER        THIS PLAN OF TREATMENT AND WHILE UNDER MY CARE     (Physician attestation of this document indicates review and certification of the therapy plan).     Certification date from: 3/10/22  Certification date to: 6/8/22    Referring Provider: Sherry Esquivel MD

## 2022-04-06 ENCOUNTER — VIRTUAL VISIT (OUTPATIENT)
Dept: ONCOLOGY | Facility: CLINIC | Age: 72
End: 2022-04-06
Attending: PHYSICIAN ASSISTANT
Payer: MEDICARE

## 2022-04-06 DIAGNOSIS — D70.1 CHEMOTHERAPY-INDUCED NEUTROPENIA (H): ICD-10-CM

## 2022-04-06 DIAGNOSIS — C49.9 SPINDLE CELL SARCOMA (H): Primary | ICD-10-CM

## 2022-04-06 DIAGNOSIS — T45.1X5A CHEMOTHERAPY-INDUCED NEUTROPENIA (H): ICD-10-CM

## 2022-04-06 DIAGNOSIS — B37.0 THRUSH: ICD-10-CM

## 2022-04-06 PROCEDURE — 99215 OFFICE O/P EST HI 40 MIN: CPT | Mod: 95 | Performed by: PHYSICIAN ASSISTANT

## 2022-04-06 PROCEDURE — G0463 HOSPITAL OUTPT CLINIC VISIT: HCPCS | Mod: PN,RTG | Performed by: PHYSICIAN ASSISTANT

## 2022-04-06 RX ORDER — ALBUTEROL SULFATE 90 UG/1
1-2 AEROSOL, METERED RESPIRATORY (INHALATION)
Status: CANCELLED | OUTPATIENT
Start: 2022-04-07

## 2022-04-06 RX ORDER — MEPERIDINE HYDROCHLORIDE 25 MG/ML
25 INJECTION INTRAMUSCULAR; INTRAVENOUS; SUBCUTANEOUS EVERY 30 MIN PRN
Status: CANCELLED | OUTPATIENT
Start: 2022-04-07

## 2022-04-06 RX ORDER — HEPARIN SODIUM (PORCINE) LOCK FLUSH IV SOLN 100 UNIT/ML 100 UNIT/ML
5 SOLUTION INTRAVENOUS
Status: CANCELLED | OUTPATIENT
Start: 2022-04-07

## 2022-04-06 RX ORDER — ESCITALOPRAM OXALATE 10 MG/1
10 TABLET ORAL DAILY
Qty: 30 TABLET | Refills: 3 | Status: SHIPPED | OUTPATIENT
Start: 2022-04-06 | End: 2022-01-01

## 2022-04-06 RX ORDER — METHYLPREDNISOLONE SODIUM SUCCINATE 125 MG/2ML
125 INJECTION, POWDER, LYOPHILIZED, FOR SOLUTION INTRAMUSCULAR; INTRAVENOUS
Status: CANCELLED | OUTPATIENT
Start: 2022-04-07

## 2022-04-06 RX ORDER — ALBUTEROL SULFATE 0.83 MG/ML
2.5 SOLUTION RESPIRATORY (INHALATION)
Status: CANCELLED | OUTPATIENT
Start: 2022-04-07

## 2022-04-06 RX ORDER — EPINEPHRINE 1 MG/ML
0.3 INJECTION, SOLUTION INTRAMUSCULAR; SUBCUTANEOUS EVERY 5 MIN PRN
Status: CANCELLED | OUTPATIENT
Start: 2022-04-07

## 2022-04-06 RX ORDER — DIPHENHYDRAMINE HYDROCHLORIDE 50 MG/ML
50 INJECTION INTRAMUSCULAR; INTRAVENOUS
Status: CANCELLED | OUTPATIENT
Start: 2022-04-07

## 2022-04-06 RX ORDER — NALOXONE HYDROCHLORIDE 0.4 MG/ML
0.2 INJECTION, SOLUTION INTRAMUSCULAR; INTRAVENOUS; SUBCUTANEOUS
Status: CANCELLED | OUTPATIENT
Start: 2022-04-07

## 2022-04-06 RX ORDER — FLUCONAZOLE 100 MG/1
100 TABLET ORAL DAILY
Qty: 30 TABLET | Refills: 3 | Status: SHIPPED | OUTPATIENT
Start: 2022-04-06 | End: 2022-05-11

## 2022-04-06 NOTE — PROGRESS NOTES
Oncology/Hematology Visit Note  Apr 6, 2022    Reason for Visit: Follow up of spindle cell sarcoma     History of Present Illness: Ifrah Huitron is a 71 year old male with PMH rosacea, pituitary macroadenoma s/p resection, GERD, kidney stones, DJD with metastatic spindle cell sarcoma. He presented to his PCP March 2021 with chest pain/left shoulder and back pain that led to imaging which revealed multiple lung mets. There were difficulties in getting diagnostic biopsy, eventually biopsy of mediastinal mass with spindle cell sarcoma. There was high PD-L1 expression (90%). Baseline imaging 6/21/21 with progression of lung mets and left-sided subdiaphragmatic mass, stable liver lesions. 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      He started Keytruda 6/21/21. CT 8/2/21 with positive response to treatment. CT 10/18/21 with mixed response- LUQ mass larger, anterior mediastinal and left anterior pleural mass smaller. Keytruda stopped.     Started on Doxil + Ifosfamide 10/26/21. Poorly tolerated with mucositis, nausea, poor oral intake. CT with stable to positive response.      Received C2 12/1/21 (delayed for tolerance issues) with 10% dose reduction.     Received C3 1/4/22 with same 10% dose reduction. He had issues with nausea inpatient along with recurrent thrush.      Received C4 2/2/22 with same 10% dose reduction. Had more significant neurotoxicity so only received 5.5 days. Symptoms resolved after stopping Ifosfamide.      CT 3/8/22 with positive response to treatment.     He is now on Doxil alone.     Interval History:  Ifrah was called today for follow-up. He had notable fatigue and weakness after Doxil which he was surprised by. It did improve with IVF and potassium. He continues to struggle with recurrent thrush despite Nystatin/healios. Fluconazole is the only thing that improves his symptoms. His mouth was very sore this last  "cycle and did limit intake at times. Better now though taste is still off. His skin is still dry with a few sore patches but generally better. GI side effects from treatment are minimal. No fevers, neuro concerns, respiratory complaints. No cancer related pain. Mood is bright.      Current Outpatient Medications   Medication Sig Dispense Refill     cholecalciferol (VITAMIN D-1000 MAX ST) 1000 units TABS Take 1,000 Units by mouth daily        escitalopram (LEXAPRO) 10 MG tablet Take 1 tablet (10 mg) by mouth daily 30 tablet 1     guaiFENesin-codeine (GUAIFENESIN AC) 100-10 MG/5ML syrup Take 10 mLs by mouth every 4 hours as needed for cough 118 mL 0     hydrocortisone (CORTEF) 10 MG tablet Take 20 mg in the morning and 10 mg in the afternoon 270 tablet 3     Insulin Syringe-Needle U-100 27.5G X 5/8\" 2 ML MISC 1 each daily as needed (with solucortef for adrenal crisis) 10 each 1     levothyroxine (SYNTHROID/LEVOTHROID) 75 MCG tablet Take 1 tablet (75 mcg) by mouth every morning 90 tablet 3     Menthol-Methyl Salicylate (DALIA MALIK GREASELESS) cream Apply topically every 6 hours as needed       metroNIDAZOLE (METROGEL) 1 % external gel Apply topically daily .Try stronger dose due to increased symptoms after chemo. 30 g 0     omeprazole (PRILOSEC) 20 MG DR capsule Take 2 capsules (40 mg) by mouth daily 60 capsule 1     ondansetron (ZOFRAN) 4 MG tablet Take 1-2 tablets (4-8 mg) by mouth every 8 hours as needed for nausea 60 tablet 3     potassium chloride ER (KLOR-CON M) 20 MEQ CR tablet Take 1 tablet (20 mEq) by mouth daily 30 tablet 1     prochlorperazine (COMPAZINE) 5 MG tablet Take 1 tablet (5 mg) by mouth every 6 hours as needed for nausea or vomiting . Caution: causes sedation. 30 tablet 1     SUMAtriptan (IMITREX) 50 MG tablet Take 1 tablet (50 mg) by mouth at onset of headache for migraine May repeat in 2 hours. Max 4 tablets/24 hours. 10 tablet 3     triamcinolone (KENALOG) 0.1 % external cream Apply topically 2 " times daily to bothersome skin areas. 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     Thyroid disease     removed pituitary gland     Past Surgical History:   Procedure Laterality Date     COLONOSCOPY N/A 12/17/2020    Procedure: COLONOSCOPY;  Surgeon: Ken Camacho MD;  Location: [facility]     ENT SURGERY       HERNIA REPAIR       INSERT PORT VASCULAR ACCESS Right 1/28/2022    Procedure: INSERTION, VASCULAR ACCESS PORT;  Surgeon: Daniel Kinney MD;  Location: [facility] OR     IR CHEST PORT PLACEMENT > 5 YRS OF AGE  1/28/2022     LARYNGOSCOPY, EXCISE VOCAL CORD LESION MICROSCOPIC, COMBINED Left 07/01/2021    Procedure: MICROLARYNGOSCOPY, LEFT TRUE VOCAL CORD INJECTION WITH PROLARYN;  Surgeon: Kyree Bearden MD;  Location: [facility]     PHACOEMULSIFICATION WITH STANDARD INTRAOCULAR LENS IMPLANT Right 03/10/2021    Procedure: Cataract removal with implant.;  Surgeon: Jamir Mac MD;  Location: [facility]     PHACOEMULSIFICATION WITH STANDARD INTRAOCULAR LENS IMPLANT Left 04/05/2021    Procedure: Cataract removal with implant.;  Surgeon: Jamir Mac MD;  Location: [facility]     PICC DOUBLE LUMEN PLACEMENT Right 12/01/2021    5FR DL PICC, basilic vein. L-38cm, 1cm out.     PICC DOUBLE LUMEN PLACEMENT Right 01/04/2022    Right cephalic, 41 cm, 1 external length     PITUITARY EXCISION       tooth pulled 4/7  Right      Allergies   Allergen Reactions     Penicillins Hives and Swelling            Social History   Social History     Tobacco Use     Smoking status: Never Smoker     Smokeless tobacco: Never Used   Substance Use Topics     Alcohol use: Yes     Comment: rare     Drug use: Never      Past medical history and social history were reviewed.    Physical Examination:  There were no vitals taken for this visit.  Wt Readings from Last 10 Encounters:   03/25/22 66.2 kg (146 lb)   03/10/22  66.2 kg (146 lb)   02/08/22 63 kg (138 lb 14.4 oz)   01/28/22 65.8 kg (145 lb)   01/11/22 65.1 kg (143 lb 8 oz)   12/09/21 66.7 kg (147 lb)   12/09/21 67 kg (147 lb 11.2 oz)   12/08/21 64.8 kg (142 lb 13.7 oz)   11/12/21 68 kg (150 lb)   11/11/21 69.9 kg (154 lb)     Video physical exam  General: Patient appears well in no acute distress.   Skin: No visualized rash or lesions on visualized skin  Eyes: EOMI, no erythema, sclera icterus or discharge noted  Resp: Appears to be breathing comfortably without accessory muscle usage, speaking in full sentences, no cough  MSK: Appears to have normal range of motion based on visualized movements  Neurologic: No apparent tremors, facial movements symmetric  Psych: affect normal, alert and oriented    The rest of a comprehensive physical examination is deferred due to PHE (public health emergency) video restrictions    Laboratory Data:   Results for MARISOL XIE (MRN [id number]) as of 4/6/2022 20:43   3/31/2022 14:23   Sodium 142   Potassium 3.4   Chloride 107   Carbon Dioxide 30   Urea Nitrogen 17   Creatinine 1.11   GFR Estimate 70   Calcium 9.2   Anion Gap 5   Magnesium 2.3   Phosphorus 2.3 (L)   Albumin 3.5   Protein Total 7.0   Bilirubin Total 0.3   Alkaline Phosphatase 131   ALT 21   AST 18   Glucose 112 (H)   WBC 6.5   Hemoglobin 10.0 (L)   Hematocrit 31.2 (L)   Platelet Count 199   RBC Count 3.18 (L)   MCV 98   MCH 31.4   MCHC 32.1   RDW 16.8 (H)   % Neutrophils 66   % Lymphocytes 19   % Monocytes 11   % Eosinophils 3   % Basophils 1   Absolute Basophils 0.0   Absolute Eosinophils 0.2   Absolute Immature Granulocytes 0.0   Absolute Lymphocytes 1.3   Absolute Monocytes 0.7   % Immature Granulocytes 0   Absolute Neutrophils 4.3   Absolute NRBCs 0.0   NRBCs per 100 WBC 0       Assessment and Plan:  1. 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 High PD-L1. Was on Keytruda but had progression, now on Doxil + Ifos started  10/26/21. Port placed 1/28/22.     He has now completed 4 cycles of Doxil + Ifos, has had multiple tolerance issues including nausea/vomiting, dehydration, neurotoxicity, mucositis, skin toxicity, pancytopenia, hypokalemia, hypophosphatemia. CT with positive response.      Now on Doxil alone- still had issues last month with mucositis/thrush, dehydration, weakness. Skin stable. Will slightly reduce dose to 65mg for treatment tomorrow.     Plan to repeat imaging prior to next cycle. Assuming disease is stable on Doxil alone will plan to continue.      2. GI  Dyspepsia: Continue Omeprazole 40mg daily. Continue Tums PRN     CINV: Resolved. Zofran PRN.      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 Cal Saba endocrine.     Hypothyroidism continue Synthroid 75mcg daily.       4. Derm  Rosacea: Long standing issue, continue Metrogel PRN     Hand/foot: 2/2 Doxil, icing hands and feet. Continue emollients at home. Kenalog cream for bothersome/blistering and red areas. Skin improving and no functional limitations.       5. MSK  Left shoulder/back/chest wall pain 2/2 tumor, following with palliative. No pain currently, off all pain medications.       6. ENT  Hoarseness: Thought 2/2 mediastinal mass vs irritation from prior biopsy. Following with ENT, s/p vocal cord infection 7/1/21. Following with voice clinic. This is improved.     Mucositis/Thrush: Recurrent issue. As such will try ppx fluconazole 100mg daily this month to see if he does better. ENT referral placed for recurrent thrush.      7. Pulm/Cards  Working with speech on laryngreal dysfunction. Improved.      Continue Guaifenesin-Coedine PRN for cough, much improved.      Off statin due to LFT elevation, improved.      8. FEN  Hypokalemia: Recurrent issue, continue 20meq daily. He feels better on supplement.      Hypophosphatemia: Off supplement, occasionally a bit low but overall better. Monitor.     Dehydration: Continue weekly IVF,  helping. Ideally should not need with Doxil alone in the future but will see how he does with next cycle.      9. Psych/Neuro  Anxiety/depression: Improved, on Lexapro and feels like it is helping. Continue.      Continue PRN Imitrex for migraines.      10. Heme  Pancytopenia 2/2 chemotherapy. Much improved on Doxil alone.    45 minutes spent on the date of the encounter doing chart review, review of test results, interpretation of tests, patient visit and documentation     Maynor Rios PA-C  Department of Hematology and Oncology  [facility]

## 2022-04-06 NOTE — LETTER
4/6/2022         RE: Ifrah Huitron  [id number] [address] [place] [address]        Dear Colleague,    Thank you for referring your patient, Ifrah Huitron, to the [facility]. Please see a copy of my visit note below.    Oncology/Hematology Visit Note  Apr 6, 2022    Reason for Visit: Follow up of spindle cell sarcoma     History of Present Illness: Ifrah Huitron is a 71 year old male with PMH rosacea, pituitary macroadenoma s/p resection, GERD, kidney stones, DJD with metastatic spindle cell sarcoma. He presented to his PCP March 2021 with chest pain/left shoulder and back pain that led to imaging which revealed multiple lung mets. There were difficulties in getting diagnostic biopsy, eventually biopsy of mediastinal mass with spindle cell sarcoma. There was high PD-L1 expression (90%). Baseline imaging 6/21/21 with progression of lung mets and left-sided subdiaphragmatic mass, stable liver lesions. 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      He started Keytruda 6/21/21. CT 8/2/21 with positive response to treatment. CT 10/18/21 with mixed response- LUQ mass larger, anterior mediastinal and left anterior pleural mass smaller. Keytruda stopped.     Started on Doxil + Ifosfamide 10/26/21. Poorly tolerated with mucositis, nausea, poor oral intake. CT with stable to positive response.      Received C2 12/1/21 (delayed for tolerance issues) with 10% dose reduction.     Received C3 1/4/22 with same 10% dose reduction. He had issues with nausea inpatient along with recurrent thrush.      Received C4 2/2/22 with same 10% dose reduction. Had more significant neurotoxicity so only received 5.5 days. Symptoms resolved after stopping Ifosfamide.      CT 3/8/22 with positive response to treatment.     He is now on Doxil alone.     Interval History:  Ifrah was called today for follow-up. He had notable fatigue and  "weakness after Doxil which he was surprised by. It did improve with IVF and potassium. He continues to struggle with recurrent thrush despite Nystatin/healios. Fluconazole is the only thing that improves his symptoms. His mouth was very sore this last cycle and did limit intake at times. Better now though taste is still off. His skin is still dry with a few sore patches but generally better. GI side effects from treatment are minimal. No fevers, neuro concerns, respiratory complaints. No cancer related pain. Mood is bright.      Current Outpatient Medications   Medication Sig Dispense Refill     cholecalciferol (VITAMIN D-1000 MAX ST) 1000 units TABS Take 1,000 Units by mouth daily        escitalopram (LEXAPRO) 10 MG tablet Take 1 tablet (10 mg) by mouth daily 30 tablet 1     guaiFENesin-codeine (GUAIFENESIN AC) 100-10 MG/5ML syrup Take 10 mLs by mouth every 4 hours as needed for cough 118 mL 0     hydrocortisone (CORTEF) 10 MG tablet Take 20 mg in the morning and 10 mg in the afternoon 270 tablet 3     Insulin Syringe-Needle U-100 27.5G X 5/8\" 2 ML MISC 1 each daily as needed (with solucortef for adrenal crisis) 10 each 1     levothyroxine (SYNTHROID/LEVOTHROID) 75 MCG tablet Take 1 tablet (75 mcg) by mouth every morning 90 tablet 3     Menthol-Methyl Salicylate (DALIA MALIK GREASELESS) cream Apply topically every 6 hours as needed       metroNIDAZOLE (METROGEL) 1 % external gel Apply topically daily .Try stronger dose due to increased symptoms after chemo. 30 g 0     omeprazole (PRILOSEC) 20 MG DR capsule Take 2 capsules (40 mg) by mouth daily 60 capsule 1     ondansetron (ZOFRAN) 4 MG tablet Take 1-2 tablets (4-8 mg) by mouth every 8 hours as needed for nausea 60 tablet 3     potassium chloride ER (KLOR-CON M) 20 MEQ CR tablet Take 1 tablet (20 mEq) by mouth daily 30 tablet 1     prochlorperazine (COMPAZINE) 5 MG tablet Take 1 tablet (5 mg) by mouth every 6 hours as needed for nausea or vomiting . Caution: causes " sedation. 30 tablet 1     SUMAtriptan (IMITREX) 50 MG tablet Take 1 tablet (50 mg) by mouth at onset of headache for migraine May repeat in 2 hours. Max 4 tablets/24 hours. 10 tablet 3     triamcinolone (KENALOG) 0.1 % external cream Apply topically 2 times daily to bothersome skin areas. 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     Thyroid disease     removed pituitary gland     Past Surgical History:   Procedure Laterality Date     COLONOSCOPY N/A 12/17/2020    Procedure: COLONOSCOPY;  Surgeon: Ken Camacho MD;  Location: [facility]     ENT SURGERY       HERNIA REPAIR       INSERT PORT VASCULAR ACCESS Right 1/28/2022    Procedure: INSERTION, VASCULAR ACCESS PORT;  Surgeon: Daniel Kinney MD;  Location: [facility] OR     IR CHEST PORT PLACEMENT > 5 YRS OF AGE  1/28/2022     LARYNGOSCOPY, EXCISE VOCAL CORD LESION MICROSCOPIC, COMBINED Left 07/01/2021    Procedure: MICROLARYNGOSCOPY, LEFT TRUE VOCAL CORD INJECTION WITH PROLARYN;  Surgeon: Kyree Bearden MD;  Location: [facility] OR     PHACOEMULSIFICATION WITH STANDARD INTRAOCULAR LENS IMPLANT Right 03/10/2021    Procedure: Cataract removal with implant.;  Surgeon: Jamir Mac MD;  Location: [facility] OR     PHACOEMULSIFICATION WITH STANDARD INTRAOCULAR LENS IMPLANT Left 04/05/2021    Procedure: Cataract removal with implant.;  Surgeon: Jamir Mac MD;  Location: [facility] OR     PICC DOUBLE LUMEN PLACEMENT Right 12/01/2021    5FR DL PICC, basilic vein. L-38cm, 1cm out.     PICC DOUBLE LUMEN PLACEMENT Right 01/04/2022    Right cephalic, 41 cm, 1 external length     PITUITARY EXCISION       tooth pulled 4/7  Right      Allergies   Allergen Reactions     Penicillins Hives and Swelling            Social History   Social History     Tobacco Use     Smoking status: Never Smoker     Smokeless tobacco: Never Used   Substance Use Topics     Alcohol  use: Yes     Comment: rare     Drug use: Never      Past medical history and social history were reviewed.    Physical Examination:  There were no vitals taken for this visit.  Wt Readings from Last 10 Encounters:   03/25/22 66.2 kg (146 lb)   03/10/22 66.2 kg (146 lb)   02/08/22 63 kg (138 lb 14.4 oz)   01/28/22 65.8 kg (145 lb)   01/11/22 65.1 kg (143 lb 8 oz)   12/09/21 66.7 kg (147 lb)   12/09/21 67 kg (147 lb 11.2 oz)   12/08/21 64.8 kg (142 lb 13.7 oz)   11/12/21 68 kg (150 lb)   11/11/21 69.9 kg (154 lb)     Video physical exam  General: Patient appears well in no acute distress.   Skin: No visualized rash or lesions on visualized skin  Eyes: EOMI, no erythema, sclera icterus or discharge noted  Resp: Appears to be breathing comfortably without accessory muscle usage, speaking in full sentences, no cough  MSK: Appears to have normal range of motion based on visualized movements  Neurologic: No apparent tremors, facial movements symmetric  Psych: affect normal, alert and oriented    The rest of a comprehensive physical examination is deferred due to PHE (public health emergency) video restrictions    Laboratory Data:   Results for MARISOL XIE (MRN [id number]) as of 4/6/2022 20:43   3/31/2022 14:23   Sodium 142   Potassium 3.4   Chloride 107   Carbon Dioxide 30   Urea Nitrogen 17   Creatinine 1.11   GFR Estimate 70   Calcium 9.2   Anion Gap 5   Magnesium 2.3   Phosphorus 2.3 (L)   Albumin 3.5   Protein Total 7.0   Bilirubin Total 0.3   Alkaline Phosphatase 131   ALT 21   AST 18   Glucose 112 (H)   WBC 6.5   Hemoglobin 10.0 (L)   Hematocrit 31.2 (L)   Platelet Count 199   RBC Count 3.18 (L)   MCV 98   MCH 31.4   MCHC 32.1   RDW 16.8 (H)   % Neutrophils 66   % Lymphocytes 19   % Monocytes 11   % Eosinophils 3   % Basophils 1   Absolute Basophils 0.0   Absolute Eosinophils 0.2   Absolute Immature Granulocytes 0.0   Absolute Lymphocytes 1.3   Absolute Monocytes 0.7   % Immature Granulocytes 0   Absolute  Neutrophils 4.3   Absolute NRBCs 0.0   NRBCs per 100 WBC 0       Assessment and Plan:  1. 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 High PD-L1. Was on Keytruda but had progression, now on Doxil + Ifos started 10/26/21. Port placed 1/28/22.     He has now completed 4 cycles of Doxil + Ifos, has had multiple tolerance issues including nausea/vomiting, dehydration, neurotoxicity, mucositis, skin toxicity, pancytopenia, hypokalemia, hypophosphatemia. CT with positive response.      Now on Doxil alone- still had issues last month with mucositis/thrush, dehydration, weakness. Skin stable. Will slightly reduce dose to 65mg for treatment tomorrow.     Plan to repeat imaging prior to next cycle. Assuming disease is stable on Doxil alone will plan to continue.      2. GI  Dyspepsia: Continue Omeprazole 40mg daily. Continue Tums PRN     CINV: Resolved. Zofran PRN.      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 Cal Saba endocrine.     Hypothyroidism continue Synthroid 75mcg daily.       4. Derm  Rosacea: Long standing issue, continue Metrogel PRN     Hand/foot: 2/2 Doxil, icing hands and feet. Continue emollients at home. Kenalog cream for bothersome/blistering and red areas. Skin improving and no functional limitations.       5. MSK  Left shoulder/back/chest wall pain 2/2 tumor, following with palliative. No pain currently, off all pain medications.       6. ENT  Hoarseness: Thought 2/2 mediastinal mass vs irritation from prior biopsy. Following with ENT, s/p vocal cord infection 7/1/21. Following with voice clinic. This is improved.     Mucositis/Thrush: Recurrent issue. As such will try ppx fluconazole 100mg daily this month to see if he does better. ENT referral placed for recurrent thrush.      7. Pulm/Cards  Working with speech on laryngreal dysfunction. Improved.      Continue Guaifenesin-Coedine PRN for cough, much improved.      Off statin due  to LFT elevation, improved.      8. FEN  Hypokalemia: Recurrent issue, continue 20meq daily. He feels better on supplement.      Hypophosphatemia: Off supplement, occasionally a bit low but overall better. Monitor.     Dehydration: Continue weekly IVF, helping. Ideally should not need with Doxil alone in the future but will see how he does with next cycle.      9. Psych/Neuro  Anxiety/depression: Improved, on Lexapro and feels like it is helping. Continue.      Continue PRN Imitrex for migraines.      10. Heme  Pancytopenia 2/2 chemotherapy. Much improved on Doxil alone.    45 minutes spent on the date of the encounter doing chart review, review of test results, interpretation of tests, patient visit and documentation     Maynor Rios PA-C  Department of Hematology and Oncology  [facility]       Ifrah is a 71 year old who is being evaluated via a billable video visit.      How would you like to obtain your AVS? MyChart  If the video visit is dropped, the invitation should be resent by: Text to cell phone: [phone]  Will anyone else be joining your video visit? Emma Bruno      Video Start Time: 3:30pm    Video-Visit Details    Type of service:  Video Visit    Video End Time:4:00pm    Originating Location (pt. Location): Home    Distant Location (provider location):  [facility]     Platform used for Video Visit: Thomas      Again, thank you for allowing me to participate in the care of your patient.        Sincerely,        GERALDO Mullins

## 2022-04-06 NOTE — LETTER
4/6/2022         RE: Ifrah Huitron  [address] [address] [facility] [id number]        Dear Colleague,    Thank you for referring your patient, Ifrah Huitron, to the [facility]. Please see a copy of my visit note below.    Oncology/Hematology Visit Note  Apr 6, 2022    Reason for Visit: Follow up of spindle cell sarcoma     History of Present Illness: Ifrah Huitron is a 71 year old male with PMH rosacea, pituitary macroadenoma s/p resection, GERD, kidney stones, DJD with metastatic spindle cell sarcoma. He presented to his PCP March 2021 with chest pain/left shoulder and back pain that led to imaging which revealed multiple lung mets. There were difficulties in getting diagnostic biopsy, eventually biopsy of mediastinal mass with spindle cell sarcoma. There was high PD-L1 expression (90%). Baseline imaging 6/21/21 with progression of lung mets and left-sided subdiaphragmatic mass, stable liver lesions. 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      He started Keytruda 6/21/21. CT 8/2/21 with positive response to treatment. CT 10/18/21 with mixed response- LUQ mass larger, anterior mediastinal and left anterior pleural mass smaller. Keytruda stopped.     Started on Doxil + Ifosfamide 10/26/21. Poorly tolerated with mucositis, nausea, poor oral intake. CT with stable to positive response.      Received C2 12/1/21 (delayed for tolerance issues) with 10% dose reduction.     Received C3 1/4/22 with same 10% dose reduction. He had issues with nausea inpatient along with recurrent thrush.      Received C4 2/2/22 with same 10% dose reduction. Had more significant neurotoxicity so only received 5.5 days. Symptoms resolved after stopping Ifosfamide.      CT 3/8/22 with positive response to treatment.     He is now on Doxil alone.     Interval History:  Ifrah was called today for follow-up. He had notable fatigue and  "weakness after Doxil which he was surprised by. It did improve with IVF and potassium. He continues to struggle with recurrent thrush despite Nystatin/healios. Fluconazole is the only thing that improves his symptoms. His mouth was very sore this last cycle and did limit intake at times. Better now though taste is still off. His skin is still dry with a few sore patches but generally better. GI side effects from treatment are minimal. No fevers, neuro concerns, respiratory complaints. No cancer related pain. Mood is bright.      Current Outpatient Medications   Medication Sig Dispense Refill     cholecalciferol (VITAMIN D-1000 MAX ST) 1000 units TABS Take 1,000 Units by mouth daily        escitalopram (LEXAPRO) 10 MG tablet Take 1 tablet (10 mg) by mouth daily 30 tablet 1     guaiFENesin-codeine (GUAIFENESIN AC) 100-10 MG/5ML syrup Take 10 mLs by mouth every 4 hours as needed for cough 118 mL 0     hydrocortisone (CORTEF) 10 MG tablet Take 20 mg in the morning and 10 mg in the afternoon 270 tablet 3     Insulin Syringe-Needle U-100 27.5G X 5/8\" 2 ML MISC 1 each daily as needed (with solucortef for adrenal crisis) 10 each 1     levothyroxine (SYNTHROID/LEVOTHROID) 75 MCG tablet Take 1 tablet (75 mcg) by mouth every morning 90 tablet 3     Menthol-Methyl Salicylate (DALIA MALIK GREASELESS) cream Apply topically every 6 hours as needed       metroNIDAZOLE (METROGEL) 1 % external gel Apply topically daily .Try stronger dose due to increased symptoms after chemo. 30 g 0     omeprazole (PRILOSEC) 20 MG DR capsule Take 2 capsules (40 mg) by mouth daily 60 capsule 1     ondansetron (ZOFRAN) 4 MG tablet Take 1-2 tablets (4-8 mg) by mouth every 8 hours as needed for nausea 60 tablet 3     potassium chloride ER (KLOR-CON M) 20 MEQ CR tablet Take 1 tablet (20 mEq) by mouth daily 30 tablet 1     prochlorperazine (COMPAZINE) 5 MG tablet Take 1 tablet (5 mg) by mouth every 6 hours as needed for nausea or vomiting . Caution: causes " sedation. 30 tablet 1     SUMAtriptan (IMITREX) 50 MG tablet Take 1 tablet (50 mg) by mouth at onset of headache for migraine May repeat in 2 hours. Max 4 tablets/24 hours. 10 tablet 3     triamcinolone (KENALOG) 0.1 % external cream Apply topically 2 times daily to bothersome skin areas. 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     Thyroid disease     removed pituitary gland     Past Surgical History:   Procedure Laterality Date     COLONOSCOPY N/A 12/17/2020    Procedure: COLONOSCOPY;  Surgeon: Ken Camacho MD;  Location: [facility]     ENT SURGERY       HERNIA REPAIR       INSERT PORT VASCULAR ACCESS Right 1/28/2022    Procedure: INSERTION, VASCULAR ACCESS PORT;  Surgeon: Daniel Kinney MD;  [facility] [facility]     IR CHEST PORT PLACEMENT > 5 YRS OF AGE  1/28/2022     LARYNGOSCOPY, EXCISE VOCAL CORD LESION MICROSCOPIC, COMBINED Left 07/01/2021    Procedure: MICROLARYNGOSCOPY, LEFT TRUE VOCAL CORD INJECTION WITH PROLARYN;  Surgeon: Kyree Bearden MD;  Location: [facility]     PHACOEMULSIFICATION WITH STANDARD INTRAOCULAR LENS IMPLANT Right 03/10/2021    Procedure: Cataract removal with implant.;  Surgeon: Jamir Mac MD;  Location: [facility]     PHACOEMULSIFICATION WITH STANDARD INTRAOCULAR LENS IMPLANT Left 04/05/2021    Procedure: Cataract removal with implant.;  Surgeon: Jamir Mac MD;  Location: [facility]     PICC DOUBLE LUMEN PLACEMENT Right 12/01/2021    5FR DL PICC, basilic vein. L-38cm, 1cm out.     PICC DOUBLE LUMEN PLACEMENT Right 01/04/2022    Right cephalic, 41 cm, 1 external length     PITUITARY EXCISION       tooth pulled 4/7  Right      Allergies   Allergen Reactions     Penicillins Hives and Swelling            Social History   Social History     Tobacco Use     Smoking status: Never Smoker     Smokeless tobacco: Never Used   Substance Use Topics     Alcohol  use: Yes     Comment: rare     Drug use: Never      Past medical history and social history were reviewed.    Physical Examination:  There were no vitals taken for this visit.  Wt Readings from Last 10 Encounters:   03/25/22 66.2 kg (146 lb)   03/10/22 66.2 kg (146 lb)   02/08/22 63 kg (138 lb 14.4 oz)   01/28/22 65.8 kg (145 lb)   01/11/22 65.1 kg (143 lb 8 oz)   12/09/21 66.7 kg (147 lb)   12/09/21 67 kg (147 lb 11.2 oz)   12/08/21 64.8 kg (142 lb 13.7 oz)   11/12/21 68 kg (150 lb)   11/11/21 69.9 kg (154 lb)     Video physical exam  General: Patient appears well in no acute distress.   Skin: No visualized rash or lesions on visualized skin  Eyes: EOMI, no erythema, sclera icterus or discharge noted  Resp: Appears to be breathing comfortably without accessory muscle usage, speaking in full sentences, no cough  MSK: Appears to have normal range of motion based on visualized movements  Neurologic: No apparent tremors, facial movements symmetric  Psych: affect normal, alert and oriented    The rest of a comprehensive physical examination is deferred due to PHE (public health emergency) video restrictions    Laboratory Data:   Results for MARISOL XIE (MRN [id number]) as of 4/6/2022 20:43   3/31/2022 14:23   Sodium 142   Potassium 3.4   Chloride 107   Carbon Dioxide 30   Urea Nitrogen 17   Creatinine 1.11   GFR Estimate 70   Calcium 9.2   Anion Gap 5   Magnesium 2.3   Phosphorus 2.3 (L)   Albumin 3.5   Protein Total 7.0   Bilirubin Total 0.3   Alkaline Phosphatase 131   ALT 21   AST 18   Glucose 112 (H)   WBC 6.5   Hemoglobin 10.0 (L)   Hematocrit 31.2 (L)   Platelet Count 199   RBC Count 3.18 (L)   MCV 98   MCH 31.4   MCHC 32.1   RDW 16.8 (H)   % Neutrophils 66   % Lymphocytes 19   % Monocytes 11   % Eosinophils 3   % Basophils 1   Absolute Basophils 0.0   Absolute Eosinophils 0.2   Absolute Immature Granulocytes 0.0   Absolute Lymphocytes 1.3   Absolute Monocytes 0.7   % Immature Granulocytes 0   Absolute  Neutrophils 4.3   Absolute NRBCs 0.0   NRBCs per 100 WBC 0       Assessment and Plan:  1. 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 High PD-L1. Was on Keytruda but had progression, now on Doxil + Ifos started 10/26/21. Port placed 1/28/22.     He has now completed 4 cycles of Doxil + Ifos, has had multiple tolerance issues including nausea/vomiting, dehydration, neurotoxicity, mucositis, skin toxicity, pancytopenia, hypokalemia, hypophosphatemia. CT with positive response.      Now on Doxil alone- still had issues last month with mucositis/thrush, dehydration, weakness. Skin stable. Will slightly reduce dose to 65mg for treatment tomorrow.     Plan to repeat imaging prior to next cycle. Assuming disease is stable on Doxil alone will plan to continue.      2. GI  Dyspepsia: Continue Omeprazole 40mg daily. Continue Tums PRN     CINV: Resolved. Zofran PRN.      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 Cal Saba endocrine.     Hypothyroidism continue Synthroid 75mcg daily.       4. Derm  Rosacea: Long standing issue, continue Metrogel PRN     Hand/foot: 2/2 Doxil, icing hands and feet. Continue emollients at home. Kenalog cream for bothersome/blistering and red areas. Skin improving and no functional limitations.       5. MSK  Left shoulder/back/chest wall pain 2/2 tumor, following with palliative. No pain currently, off all pain medications.       6. ENT  Hoarseness: Thought 2/2 mediastinal mass vs irritation from prior biopsy. Following with ENT, s/p vocal cord infection 7/1/21. Following with voice clinic. This is improved.     Mucositis/Thrush: Recurrent issue. As such will try ppx fluconazole 100mg daily this month to see if he does better. ENT referral placed for recurrent thrush.      7. Pulm/Cards  Working with speech on laryngreal dysfunction. Improved.      Continue Guaifenesin-Coedine PRN for cough, much improved.      Off statin due  to LFT elevation, improved.      8. FEN  Hypokalemia: Recurrent issue, continue 20meq daily. He feels better on supplement.      Hypophosphatemia: Off supplement, occasionally a bit low but overall better. Monitor.     Dehydration: Continue weekly IVF, helping. Ideally should not need with Doxil alone in the future but will see how he does with next cycle.      9. Psych/Neuro  Anxiety/depression: Improved, on Lexapro and feels like it is helping. Continue.      Continue PRN Imitrex for migraines.      10. Heme  Pancytopenia 2/2 chemotherapy. Much improved on Doxil alone.    45 minutes spent on the date of the encounter doing chart review, review of test results, interpretation of tests, patient visit and documentation     Maynor Rios PA-C  Department of Hematology and Oncology  [facility]       Ifrah is a 71 year old who is being evaluated via a billable video visit.      How would you like to obtain your AVS? MyChart  If the video visit is dropped, the invitation should be resent by: Text to cell phone: [phone]  Will anyone else be joining your video visit? Emma Bruno      Video Start Time: 3:30pm    Video-Visit Details    Type of service:  Video Visit    Video End Time:4:00pm    Originating Location (pt. Location): Home    Distant Location (provider location):  [facility]     Platform used for Video Visit: [employer]      Again, thank you for allowing me to participate in the care of your patient.        Sincerely,        GERALDO Mullins

## 2022-04-06 NOTE — PROGRESS NOTES
Ifrah is a 71 year old who is being evaluated via a billable video visit.      How would you like to obtain your AVS? MyChart  If the video visit is dropped, the invitation should be resent by: Text to cell phone: [phone]  Will anyone else be joining your video visit? Emma Márquezgabrielle Bruno      Video Start Time: 3:30pm    Video-Visit Details    Type of service:  Video Visit    Video End Time:4:00pm    Originating Location (pt. Location): Home    Distant Location (provider location):  [facility]     Platform used for Video Visit: [employer]

## 2022-04-07 ENCOUNTER — INFUSION THERAPY VISIT (OUTPATIENT)
Dept: INFUSION THERAPY | Facility: CLINIC | Age: 72
End: 2022-04-07
Attending: PHYSICIAN ASSISTANT
Payer: MEDICARE

## 2022-04-07 VITALS
HEART RATE: 66 BPM | SYSTOLIC BLOOD PRESSURE: 164 MMHG | WEIGHT: 142.6 LBS | TEMPERATURE: 97.8 F | BODY MASS INDEX: 22.33 KG/M2 | DIASTOLIC BLOOD PRESSURE: 90 MMHG

## 2022-04-07 DIAGNOSIS — R11.0 CHEMOTHERAPY-INDUCED NAUSEA: Primary | ICD-10-CM

## 2022-04-07 DIAGNOSIS — C49.9 SARCOMA (H): ICD-10-CM

## 2022-04-07 DIAGNOSIS — T45.1X5A CHEMOTHERAPY-INDUCED NAUSEA: Primary | ICD-10-CM

## 2022-04-07 DIAGNOSIS — T45.1X5A CHEMOTHERAPY-INDUCED NEUTROPENIA (H): ICD-10-CM

## 2022-04-07 DIAGNOSIS — D70.1 CHEMOTHERAPY-INDUCED NEUTROPENIA (H): ICD-10-CM

## 2022-04-07 DIAGNOSIS — C45.9 MESOTHELIOMA, MALIGNANT (H): ICD-10-CM

## 2022-04-07 DIAGNOSIS — C49.9 SPINDLE CELL SARCOMA (H): ICD-10-CM

## 2022-04-07 DIAGNOSIS — Z51.89 ENCOUNTER FOR OTHER SPECIFIED AFTERCARE: ICD-10-CM

## 2022-04-07 DIAGNOSIS — Z51.89 ENCOUNTER FOR OTHER SPECIFIED AFTERCARE: Primary | ICD-10-CM

## 2022-04-07 LAB
ALBUMIN SERPL-MCNC: 3.6 G/DL (ref 3.4–5)
ALP SERPL-CCNC: 129 U/L (ref 40–150)
ALT SERPL W P-5'-P-CCNC: 20 U/L (ref 0–70)
ANION GAP SERPL CALCULATED.3IONS-SCNC: 3 MMOL/L (ref 3–14)
AST SERPL W P-5'-P-CCNC: 17 U/L (ref 0–45)
BASOPHILS # BLD AUTO: 0 10E3/UL (ref 0–0.2)
BASOPHILS NFR BLD AUTO: 1 %
BILIRUB SERPL-MCNC: 0.3 MG/DL (ref 0.2–1.3)
BUN SERPL-MCNC: 19 MG/DL (ref 7–30)
CALCIUM SERPL-MCNC: 9.7 MG/DL (ref 8.5–10.1)
CHLORIDE BLD-SCNC: 110 MMOL/L (ref 94–109)
CO2 SERPL-SCNC: 30 MMOL/L (ref 20–32)
CREAT SERPL-MCNC: 1.27 MG/DL (ref 0.66–1.25)
EOSINOPHIL # BLD AUTO: 0.1 10E3/UL (ref 0–0.7)
EOSINOPHIL NFR BLD AUTO: 2 %
ERYTHROCYTE [DISTWIDTH] IN BLOOD BY AUTOMATED COUNT: 16.2 % (ref 10–15)
GFR SERPL CREATININE-BSD FRML MDRD: 60 ML/MIN/1.73M2
GLUCOSE BLD-MCNC: 92 MG/DL (ref 70–99)
HCT VFR BLD AUTO: 33.9 % (ref 40–53)
HGB BLD-MCNC: 10.6 G/DL (ref 13.3–17.7)
IMM GRANULOCYTES # BLD: 0 10E3/UL
IMM GRANULOCYTES NFR BLD: 0 %
LYMPHOCYTES # BLD AUTO: 1.7 10E3/UL (ref 0.8–5.3)
LYMPHOCYTES NFR BLD AUTO: 28 %
MAGNESIUM SERPL-MCNC: 2.2 MG/DL (ref 1.6–2.3)
MCH RBC QN AUTO: 30.8 PG (ref 26.5–33)
MCHC RBC AUTO-ENTMCNC: 31.3 G/DL (ref 31.5–36.5)
MCV RBC AUTO: 99 FL (ref 78–100)
MONOCYTES # BLD AUTO: 1.1 10E3/UL (ref 0–1.3)
MONOCYTES NFR BLD AUTO: 18 %
NEUTROPHILS # BLD AUTO: 3.2 10E3/UL (ref 1.6–8.3)
NEUTROPHILS NFR BLD AUTO: 51 %
NRBC # BLD AUTO: 0 10E3/UL
NRBC BLD AUTO-RTO: 0 /100
PHOSPHATE SERPL-MCNC: 2.5 MG/DL (ref 2.5–4.5)
PLATELET # BLD AUTO: 211 10E3/UL (ref 150–450)
POTASSIUM BLD-SCNC: 3.7 MMOL/L (ref 3.4–5.3)
PROT SERPL-MCNC: 7.2 G/DL (ref 6.8–8.8)
RBC # BLD AUTO: 3.44 10E6/UL (ref 4.4–5.9)
SODIUM SERPL-SCNC: 143 MMOL/L (ref 133–144)
WBC # BLD AUTO: 6.2 10E3/UL (ref 4–11)

## 2022-04-07 PROCEDURE — 96413 CHEMO IV INFUSION 1 HR: CPT

## 2022-04-07 PROCEDURE — 96361 HYDRATE IV INFUSION ADD-ON: CPT

## 2022-04-07 PROCEDURE — 84100 ASSAY OF PHOSPHORUS: CPT | Performed by: PHYSICIAN ASSISTANT

## 2022-04-07 PROCEDURE — 83735 ASSAY OF MAGNESIUM: CPT | Performed by: PHYSICIAN ASSISTANT

## 2022-04-07 PROCEDURE — 85025 COMPLETE CBC W/AUTO DIFF WBC: CPT | Performed by: PHYSICIAN ASSISTANT

## 2022-04-07 PROCEDURE — 80053 COMPREHEN METABOLIC PANEL: CPT | Performed by: PHYSICIAN ASSISTANT

## 2022-04-07 PROCEDURE — 258N000003 HC RX IP 258 OP 636: Performed by: PHYSICIAN ASSISTANT

## 2022-04-07 PROCEDURE — 250N000011 HC RX IP 250 OP 636: Performed by: PHYSICIAN ASSISTANT

## 2022-04-07 RX ORDER — HEPARIN SODIUM (PORCINE) LOCK FLUSH IV SOLN 100 UNIT/ML 100 UNIT/ML
5 SOLUTION INTRAVENOUS
Status: DISCONTINUED | OUTPATIENT
Start: 2022-04-07 | End: 2022-04-07 | Stop reason: HOSPADM

## 2022-04-07 RX ORDER — HEPARIN SODIUM (PORCINE) LOCK FLUSH IV SOLN 100 UNIT/ML 100 UNIT/ML
5 SOLUTION INTRAVENOUS
Status: CANCELLED
Start: 2022-04-07

## 2022-04-07 RX ADMIN — DEXTROSE MONOHYDRATE 250 ML: 50 INJECTION, SOLUTION INTRAVENOUS at 12:48

## 2022-04-07 RX ADMIN — DOXORUBICIN HYDROCHLORIDE 65 MG: 2 INJECTABLE, LIPOSOMAL INTRAVENOUS at 12:49

## 2022-04-07 RX ADMIN — SODIUM CHLORIDE 1000 ML: 9 INJECTION, SOLUTION INTRAVENOUS at 11:53

## 2022-04-07 RX ADMIN — Medication 5 ML: at 14:05

## 2022-04-07 NOTE — PROGRESS NOTES
Infusion Nursing Note:  Ifrah Huitron presents today for C6D1 Doxil, IVF's.    Patient seen by provider today: No   present during visit today: Not Applicable.    Note: Message sent to GERALDO Mullins re: pt's creat level. Ok to proceed with tx.      Intravenous Access:  Implanted Port.    Treatment Conditions:  Results reviewed, labs MET treatment parameters, ok to proceed with treatment.  Mag and Phos are both WNL.    Post Infusion Assessment:  Patient tolerated infusion without incident.  Blood return noted pre and post infusion.  Site patent and intact, free from redness, edema or discomfort.  No evidence of extravasations.  Access discontinued per protocol.       Discharge Plan:   Patient discharged in stable condition accompanied by: self.  Departure Mode: Ambulatory.      Maria Simon RN

## 2022-04-11 NOTE — PROGRESS NOTES
Ifrah is a 71 year old who is being evaluated via a billable video visit.      How would you like to obtain your AVS? [employer]  If the video visit is dropped, the invitation should be resent by: Text to cell phone: [phone]  Will anyone else be joining your video visit? No Patient will have his wife listening to video.       Video Start Time:1019  Video-Visit Details    Type of service:  Video Visit    Video End Timeabout 1047    Originating Location (pt. Location):home    Distant Location (provider location):  [facility]     Platform used for Video Visit:[employer]            4-12-22     I saw Ifrah Huitron for f/u of a a tumor metastatic to the lung.       Background  His history is somewhat complicated but in brief he began developing some low left back pain about the fall winter 2309-9664.  This is gradually increased in size and he has become more tired; this led to imaging showing several lung nodules and a biopsy showing a malignant tumor.   -  biopsy showed a malignant tumor without a specific other than the descriptive diagnosis.  Notably it is a strong PD-L1 expresser.    Specimen #: [id number]   Collected: 5/20/2021   FINAL DIAGNOSIS:   Left pleural mass biopsy, CT guided:   - Malignant spindle cell neoplasm with necrosis (see comment)     COMMENT:   Special stains were performed with appropriate controls.  The tumor cells   are diffusely positive for CK   AE1/AE3 and CK MIRZA.  There is also focal to patchy staining for D2-40,   CD68, and WT-1.  INI1 is retained in   the tumor cells.  The Ki-67 labeling index is approximately 70%.  The   tumor cells show high PD-L1 expression   (TPS 90%).  No expression of P63, calretinin, DOG1, ALK1, , CK 5/6,   STAT6, SMA, SALL4, desmin, S100,   Myogenin, CD21, and CD23 is identified in the tumor cells.  These findings    together with tumor morphology and   location are most compatible with malignant sarcomatoid  mesothelioma.  The    differential diagnosis includes   sarcomatoid carcinoma and sarcomas such as synovial sarcoma and   histiocytic sarcoma.  Additional tests are   pending and may help to classify this tumor.   -  Baseline imaging 6/21/21 with progression of lung mets and left-sided subdiaphragmatic mass, stable liver lesions. 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   -      Interval history     He started keytruda 6-21-21.     CT 8/2/21 showed a positive response but CT 10/18/21 showed mixed response- LUQ mass larger, anterior mediastinal and left anterior pleural mass smaller. Keytruda stopped.    The pain went away initially w keytruda but recurred back.    He started Doxil + Ifosfamide 10-26-21 but had a lot of toxicity. CT showed stable to positive response after 1 cycle.    CT 3-8-22 positive response.     Due to significant toxicity ifos was reduced and he went to doxil alone on 3-10-22    Overall he is doing pretty well and thinks the current situation is quite livable.  The pain went away with the Keytruda and is not bothered by pain now and is not taking pain medications.    One problem is recurrent thrush for which he finds fluconazole but not nystatin/helios helpful.  He is also tried heliosis.  He does cool his mouth but only during the infusion.    He is quite active and is remodeling the garage doing some framing and she rocking so he carries heavy things up a floor of stairs.  He gets some WALKER with that.  He can walk up 2 floors and gets some WALKER without carrying things.    The hoarseness is not bad now and he sees ENT.     The nonproductive cough is much better; rare.      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       He controls GERD with Prilosec daily and prn tums.  He has still had some nausea especially after the Doxil but this is getting better as he gets farther away from the ifosfamide and last  time was not too bad.    Mood is good now.  Skin dry on hands.     Notably he had a hypophysisectomy in 2010.    ROS otherwise unremarkable    -  Background PMH, FH, SH  His past medical history is notable for hypophyasectomy in 2010 and he is on replacement T4, cortisol.  He takes minocycline daily for rosacea and feels that as needed for GERD he has had a kidney stone in the past and has some DJD, and a hernia repair.  He describes an allergy to penicillin manifest as hives.  Family history is not particularly remarkable although his mother had lupus and his father had ALS and his sister has rheumatoid arthritis.  Social history reveals he is  with 5 adult children, is a never smoker and does not abuse alcohol or other drugs.  He works as a  and has several hobbies building a garage.  -     On exam he appeared comfortable with normal affect.  HEENT full EOMs, ptosis R  NECK no obvious goiter or mass  CHEST no respiratory distress  NEURO normal mentation and speech   PSYCH Good mood     -  On 4-7 creat 1.27, LFT OK, alb 3.6, Hb 10.6, plt 211    -  Initial imaging on showed a response.     I reviewed his images of 3-8-22 showing some further response in the mass near the spleen and nothing is worse.    Formal read:  CT-CAP  IMPRESSION:  1.  Slight decrease in size is noted in the inferior component of the  anterior mediastinal soft tissue mass. The superior component appears  unchanged.  2.  Interval mild decrease in size of the lobulated subdiaphragmatic  mass invading the posterior aspect of the spleen.  3.  Stable 3 mm left lower lobe nodule.    -  We discussed the situation including the lack of a truly specific diagnosis.  We discussed we could treat this like a sarcoma but the high PD-L1 expression made it attractive to consider a PD-L1 agent.       Get initial response to Keytruda but then had progression and began ifosfamide and Doxil.  There was some response but due to toxicity he  started Doxil alone in March 2022.    There is a continued response in the lung and the mass near the spleen is larger and he has no pain and the cough is minimal.     For now we will continue with Doxil alone and I did suggest that he intermittently cool his mouth and any symptomatic skin areas such as his elbows intermittently for the 2 days after the infusion.  I also suggested he take the Prilosec twice a day and also add some liquid antacid such as Mylanta to see if that helps when he has nausea.  Regarding the thrush I suggested he have a regimen of salt wash followed by a light brushing followed by salt wash then water wash; then use some helios followed by nystatin.  At the same time he can take the fluconazole.  I suggested stopping fluconazole after 10 days and see if it recurs.  He could think about intermittent fluconazole with the above.  I am hopeful that as he gets farther from the Doxil ifosfamide combination this may improve.    We could use other agents such as Gemzar or pazopanib among others in the future.     Pain control via palliative if necessary though he has no pain now..       GERD  prilosec 40 mg/d    HFS  As above    Hoarseness  Will follow with ENT    Thrush  As above    His other diagnoses include panhypopituitarism and will be continuing those medications, the rosacea; he will continue minocycline, the GERD; he will continue as needed Prilosec.  The diagnoses of DJD, history of kidney stones and penicillin allergy are noted.       All their question addressed and will call if others arise.     Luke Wolff M.D.  Professor  Hematology, Oncology and Transplantation

## 2022-04-12 ENCOUNTER — VIRTUAL VISIT (OUTPATIENT)
Dept: ONCOLOGY | Facility: CLINIC | Age: 72
End: 2022-04-12
Attending: INTERNAL MEDICINE
Payer: MEDICARE

## 2022-04-12 DIAGNOSIS — K21.9 GASTROESOPHAGEAL REFLUX DISEASE WITHOUT ESOPHAGITIS: ICD-10-CM

## 2022-04-12 DIAGNOSIS — L27.1 HAND FOOT SYNDROME: ICD-10-CM

## 2022-04-12 DIAGNOSIS — C49.9 SPINDLE CELL SARCOMA (H): Primary | ICD-10-CM

## 2022-04-12 DIAGNOSIS — B37.0 THRUSH: ICD-10-CM

## 2022-04-12 PROCEDURE — G0463 HOSPITAL OUTPT CLINIC VISIT: HCPCS | Mod: PN,RTG | Performed by: INTERNAL MEDICINE

## 2022-04-12 PROCEDURE — 99215 OFFICE O/P EST HI 40 MIN: CPT | Mod: 95 | Performed by: INTERNAL MEDICINE

## 2022-04-12 NOTE — LETTER
4/12/2022         RE: Ifrah Huitron  [address] [place] [address]        Dear Colleague,    Thank you for referring your patient, Ifrah Huitron, to the [facility]. Please see a copy of my visit note below.    Ifrah is a 71 year old who is being evaluated via a billable video visit.      How would you like to obtain your AVS? MyChart  If the video visit is dropped, the invitation should be resent by: Text to cell phone: [phone]  Will anyone else be joining your video visit? No Patient will have his wife listening to video.       Video Start Time:1019  Video-Visit Details    Type of service:  Video Visit    Video End Timeabout 1047    Originating Location (pt. Location):home    Distant Location (provider location):  [facility]     Platform used for Video Visit:thomas Robledo            4-12-22     I saw Ifrah Huitron for f/u of a a tumor metastatic to the lung.       Background  His history is somewhat complicated but in brief he began developing some low left back pain about the fall winter 0207-0278.  This is gradually increased in size and he has become more tired; this led to imaging showing several lung nodules and a biopsy showing a malignant tumor.   -  biopsy showed a malignant tumor without a specific other than the descriptive diagnosis.  Notably it is a strong PD-L1 expresser.    Specimen #: [id number]   Collected: 5/20/2021   FINAL DIAGNOSIS:   Left pleural mass biopsy, CT guided:   - Malignant spindle cell neoplasm with necrosis (see comment)     COMMENT:   Special stains were performed with appropriate controls.  The tumor cells   are diffusely positive for CK   AE1/AE3 and CK MIRZA.  There is also focal to patchy staining for D2-40,   CD68, and WT-1.  INI1 is retained in   the tumor cells.  The Ki-67 labeling index is approximately 70%.  The   tumor cells show high PD-L1 expression   (TPS 90%).  No expression of P63,  calretinin, DOG1, ALK1, , CK 5/6,   STAT6, SMA, SALL4, desmin, S100,   Myogenin, CD21, and CD23 is identified in the tumor cells.  These findings    together with tumor morphology and   location are most compatible with malignant sarcomatoid mesothelioma.  The    differential diagnosis includes   sarcomatoid carcinoma and sarcomas such as synovial sarcoma and   histiocytic sarcoma.  Additional tests are   pending and may help to classify this tumor.   -  Baseline imaging 6/21/21 with progression of lung mets and left-sided subdiaphragmatic mass, stable liver lesions. 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   -      Interval history     He started keytruda 6-21-21.     CT 8/2/21 showed a positive response but CT 10/18/21 showed mixed response- LUQ mass larger, anterior mediastinal and left anterior pleural mass smaller. Keytruda stopped.    The pain went away initially w keytruda but recurred back.    He started Doxil + Ifosfamide 10-26-21 but had a lot of toxicity. CT showed stable to positive response after 1 cycle.    CT 3-8-22 positive response.     Due to significant toxicity ifos was reduced and he went to doxil alone on 3-10-22    Overall he is doing pretty well and thinks the current situation is quite livable.  The pain went away with the Keytruda and is not bothered by pain now and is not taking pain medications.    One problem is recurrent thrush for which he finds fluconazole but not nystatin/helios helpful.  He is also tried heliosis.  He does cool his mouth but only during the infusion.    He is quite active and is remodeling the garage doing some framing and she rocking so he carries heavy things up a floor of stairs.  He gets some WALKER with that.  He can walk up 2 floors and gets some WALKER without carrying things.    The hoarseness is not bad now and he sees ENT.     The nonproductive cough is much better; rare.       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       He controls GERD with Prilosec daily and prn tums.  He has still had some nausea especially after the Doxil but this is getting better as he gets farther away from the ifosfamide and last time was not too bad.    Mood is good now.  Skin dry on hands.     Notably he had a hypophysisectomy in 2010.    ROS otherwise unremarkable    -  Background PMH, FH, SH  His past medical history is notable for hypophyasectomy in 2010 and he is on replacement T4, cortisol.  He takes minocycline daily for rosacea and feels that as needed for GERD he has had a kidney stone in the past and has some DJD, and a hernia repair.  He describes an allergy to penicillin manifest as hives.  Family history is not particularly remarkable although his mother had lupus and his father had ALS and his sister has rheumatoid arthritis.  Social history reveals he is  with 5 adult children, is a never smoker and does not abuse alcohol or other drugs.  He works as a  and has several hobbies building a garage.  -     On exam he appeared comfortable with normal affect.  HEENT full EOMs, ptosis R  NECK no obvious goiter or mass  CHEST no respiratory distress  NEURO normal mentation and speech   PSYCH Good mood     -  On 4-7 creat 1.27, LFT OK, alb 3.6, Hb 10.6, plt 211    -  Initial imaging on showed a response.     I reviewed his images of 3-8-22 showing some further response in the mass near the spleen and nothing is worse.    Formal read:  CT-CAP  IMPRESSION:  1.  Slight decrease in size is noted in the inferior component of the  anterior mediastinal soft tissue mass. The superior component appears  unchanged.  2.  Interval mild decrease in size of the lobulated subdiaphragmatic  mass invading the posterior aspect of the spleen.  3.  Stable 3 mm left lower lobe nodule.    -  We discussed the situation including the lack of a truly specific diagnosis.  We discussed we could  treat this like a sarcoma but the high PD-L1 expression made it attractive to consider a PD-L1 agent.       Get initial response to Keytruda but then had progression and began ifosfamide and Doxil.  There was some response but due to toxicity he started Doxil alone in March 2022.    There is a continued response in the lung and the mass near the spleen is larger and he has no pain and the cough is minimal.     For now we will continue with Doxil alone and I did suggest that he intermittently cool his mouth and any symptomatic skin areas such as his elbows intermittently for the 2 days after the infusion.  I also suggested he take the Prilosec twice a day and also add some liquid antacid such as Mylanta to see if that helps when he has nausea.  Regarding the thrush I suggested he have a regimen of salt wash followed by a light brushing followed by salt wash then water wash; then use some helios followed by nystatin.  At the same time he can take the fluconazole.  I suggested stopping fluconazole after 10 days and see if it recurs.  He could think about intermittent fluconazole with the above.  I am hopeful that as he gets farther from the Doxil ifosfamide combination this may improve.    We could use other agents such as Gemzar or pazopanib among others in the future.     Pain control via palliative if necessary though he has no pain now..       GERD  prilosec 40 mg/d    HFS  As above    Hoarseness  Will follow with ENT    Thrush  As above    His other diagnoses include panhypopituitarism and will be continuing those medications, the rosacea; he will continue minocycline, the GERD; he will continue as needed Prilosec.  The diagnoses of DJD, history of kidney stones and penicillin allergy are noted.       All their question addressed and will call if others arise.             Again, thank you for allowing me to participate in the care of your patient.      Sincerely,    Luke Wolff MD

## 2022-04-14 ENCOUNTER — LAB (OUTPATIENT)
Dept: INFUSION THERAPY | Facility: CLINIC | Age: 72
End: 2022-04-14
Attending: PHYSICIAN ASSISTANT
Payer: MEDICARE

## 2022-04-14 VITALS
SYSTOLIC BLOOD PRESSURE: 139 MMHG | TEMPERATURE: 98 F | DIASTOLIC BLOOD PRESSURE: 83 MMHG | RESPIRATION RATE: 16 BRPM | OXYGEN SATURATION: 100 % | HEART RATE: 93 BPM

## 2022-04-14 DIAGNOSIS — C49.9 SPINDLE CELL SARCOMA (H): ICD-10-CM

## 2022-04-14 DIAGNOSIS — R11.0 CHEMOTHERAPY-INDUCED NAUSEA: Primary | ICD-10-CM

## 2022-04-14 DIAGNOSIS — T45.1X5A CHEMOTHERAPY-INDUCED NAUSEA: Primary | ICD-10-CM

## 2022-04-14 DIAGNOSIS — C49.9 SARCOMA (H): Primary | ICD-10-CM

## 2022-04-14 DIAGNOSIS — T45.1X5A CHEMOTHERAPY-INDUCED NAUSEA: ICD-10-CM

## 2022-04-14 DIAGNOSIS — R11.0 CHEMOTHERAPY-INDUCED NAUSEA: ICD-10-CM

## 2022-04-14 LAB
ALBUMIN SERPL-MCNC: 3.4 G/DL (ref 3.4–5)
ALP SERPL-CCNC: 122 U/L (ref 40–150)
ALT SERPL W P-5'-P-CCNC: 18 U/L (ref 0–70)
ANION GAP SERPL CALCULATED.3IONS-SCNC: 4 MMOL/L (ref 3–14)
AST SERPL W P-5'-P-CCNC: 16 U/L (ref 0–45)
BASOPHILS # BLD AUTO: 0 10E3/UL (ref 0–0.2)
BASOPHILS NFR BLD AUTO: 1 %
BILIRUB SERPL-MCNC: 0.3 MG/DL (ref 0.2–1.3)
BUN SERPL-MCNC: 19 MG/DL (ref 7–30)
CALCIUM SERPL-MCNC: 9.4 MG/DL (ref 8.5–10.1)
CHLORIDE BLD-SCNC: 109 MMOL/L (ref 94–109)
CO2 SERPL-SCNC: 28 MMOL/L (ref 20–32)
CREAT SERPL-MCNC: 1.41 MG/DL (ref 0.66–1.25)
EOSINOPHIL # BLD AUTO: 0.1 10E3/UL (ref 0–0.7)
EOSINOPHIL NFR BLD AUTO: 2 %
ERYTHROCYTE [DISTWIDTH] IN BLOOD BY AUTOMATED COUNT: 15.6 % (ref 10–15)
GFR SERPL CREATININE-BSD FRML MDRD: 53 ML/MIN/1.73M2
GLUCOSE BLD-MCNC: 142 MG/DL (ref 70–99)
HCT VFR BLD AUTO: 33.2 % (ref 40–53)
HGB BLD-MCNC: 10.7 G/DL (ref 13.3–17.7)
IMM GRANULOCYTES # BLD: 0 10E3/UL
IMM GRANULOCYTES NFR BLD: 0 %
LYMPHOCYTES # BLD AUTO: 1.8 10E3/UL (ref 0.8–5.3)
LYMPHOCYTES NFR BLD AUTO: 25 %
MAGNESIUM SERPL-MCNC: 2.3 MG/DL (ref 1.6–2.3)
MCH RBC QN AUTO: 31.3 PG (ref 26.5–33)
MCHC RBC AUTO-ENTMCNC: 32.2 G/DL (ref 31.5–36.5)
MCV RBC AUTO: 97 FL (ref 78–100)
MONOCYTES # BLD AUTO: 0.4 10E3/UL (ref 0–1.3)
MONOCYTES NFR BLD AUTO: 6 %
NEUTROPHILS # BLD AUTO: 4.8 10E3/UL (ref 1.6–8.3)
NEUTROPHILS NFR BLD AUTO: 66 %
NRBC # BLD AUTO: 0 10E3/UL
NRBC BLD AUTO-RTO: 0 /100
PHOSPHATE SERPL-MCNC: 2.2 MG/DL (ref 2.5–4.5)
PLATELET # BLD AUTO: 205 10E3/UL (ref 150–450)
POTASSIUM BLD-SCNC: 3.6 MMOL/L (ref 3.4–5.3)
PROT SERPL-MCNC: 7.2 G/DL (ref 6.8–8.8)
RBC # BLD AUTO: 3.42 10E6/UL (ref 4.4–5.9)
SODIUM SERPL-SCNC: 141 MMOL/L (ref 133–144)
WBC # BLD AUTO: 7.2 10E3/UL (ref 4–11)

## 2022-04-14 PROCEDURE — 80053 COMPREHEN METABOLIC PANEL: CPT | Performed by: PHYSICIAN ASSISTANT

## 2022-04-14 PROCEDURE — 96360 HYDRATION IV INFUSION INIT: CPT

## 2022-04-14 PROCEDURE — 258N000003 HC RX IP 258 OP 636: Performed by: PHYSICIAN ASSISTANT

## 2022-04-14 PROCEDURE — 250N000011 HC RX IP 250 OP 636: Performed by: PHYSICIAN ASSISTANT

## 2022-04-14 PROCEDURE — 85025 COMPLETE CBC W/AUTO DIFF WBC: CPT | Performed by: PHYSICIAN ASSISTANT

## 2022-04-14 PROCEDURE — 36591 DRAW BLOOD OFF VENOUS DEVICE: CPT

## 2022-04-14 PROCEDURE — 84100 ASSAY OF PHOSPHORUS: CPT | Performed by: PHYSICIAN ASSISTANT

## 2022-04-14 PROCEDURE — 83735 ASSAY OF MAGNESIUM: CPT | Performed by: PHYSICIAN ASSISTANT

## 2022-04-14 RX ORDER — HEPARIN SODIUM (PORCINE) LOCK FLUSH IV SOLN 100 UNIT/ML 100 UNIT/ML
5 SOLUTION INTRAVENOUS
Status: COMPLETED | OUTPATIENT
Start: 2022-04-14 | End: 2022-04-14

## 2022-04-14 RX ORDER — HEPARIN SODIUM (PORCINE) LOCK FLUSH IV SOLN 100 UNIT/ML 100 UNIT/ML
5 SOLUTION INTRAVENOUS
Status: CANCELLED
Start: 2022-04-14

## 2022-04-14 RX ADMIN — SODIUM CHLORIDE 1000 ML: 9 INJECTION, SOLUTION INTRAVENOUS at 10:19

## 2022-04-14 RX ADMIN — Medication 5 ML: at 11:28

## 2022-04-14 ASSESSMENT — PAIN SCALES - GENERAL: PAINLEVEL: NO PAIN (0)

## 2022-04-14 NOTE — PROGRESS NOTES
Infusion Nursing Note:  Ifrah Huitron presents today for IVFs.    Patient seen by provider today: No   present during visit today: Not Applicable.    Note: N/A.      Intravenous Access:  Implanted Port.    Treatment Conditions:  Lab Results   Component Value Date     04/14/2022    POTASSIUM 3.6 04/14/2022    MAG 2.3 04/14/2022    CR 1.41 (H) 04/14/2022    COTY 9.4 04/14/2022    BILITOTAL 0.3 04/14/2022    ALBUMIN 3.4 04/14/2022    ALT 18 04/14/2022    AST 16 04/14/2022     Results reviewed, labs MET treatment parameters, ok to proceed with treatment.  Patient taking Zofran & Compazine for nausea, declines need for add'l antiemetics.      Post Infusion Assessment:  Site patent and intact, free from redness, edema or discomfort.  No evidence of extravasations.  Access discontinued per protocol.       Discharge Plan:   Patient discharged in stable condition accompanied by: self.  Departure Mode: Ambulatory.      Warren Santana RN

## 2022-04-17 ENCOUNTER — HEALTH MAINTENANCE LETTER (OUTPATIENT)
Age: 72
End: 2022-04-17

## 2022-04-19 ENCOUNTER — NURSE TRIAGE (OUTPATIENT)
Dept: ONCOLOGY | Facility: CLINIC | Age: 72
End: 2022-04-19
Payer: MEDICARE

## 2022-04-19 NOTE — TELEPHONE ENCOUNTER
[facility] Triage    MyChart Message: Eating very little. He has no energy, stomach bad sometimes, weak, dizzy - April 13th these symptoms started.    Patient and wife would like labs and fluids twice a week to avoid the ED instead of once a week.     Prior had labs and fluids Twice a week, now once a week, they would like to go back to twice a week as he felt better after the fluids.  Patient can do a Wednesday and Friday this week.    Reviewed Thrush instructions from Dr. Wolff on 4/12 as he forgot what to do:  Regarding the thrush I suggested he have a regimen of salt wash followed by a light brushing followed by salt wash then water wash; then use some helios followed by nystatin. At the same time he can take the fluconazole    Reviewed Nausea - try 9 am and 9 prilosec and mylanta    Sleeping well at night - he is happy about this.    Forcing self to each - shredded wheat and cherrios with milk.  Tasted horrible, but ate it.  Food is not tasting good.    Advised - soft foods, rinse mouth after each meal, 6 small meals a day, 80 ounces of water, ensure, protien shakes, oatmeal, cream of wheat.    Diarrhea last 4 to 5 days, no diarrhea today. No fever, fatigue.    Paged Dr. Mariella Wolff returned paged and he can go in tomorrow to [place], cancel Thursday and go in again on Friday for PAC fluids.  He can go back to twice a week IV fluids and labs as before.  Dr. Wolff had nothing more to add to above advice.    [place] labs and IV switched from Thursday to Wednesday and they will work on getting him in on Friday.    Ifrah advised of his change in appts, to ask for a time for Friday when he is in tomorrow, follow advice above, call if questions or concerns.    Routing to Mariella, Maynor Rios and Luiz Castaneda

## 2022-04-20 ENCOUNTER — INFUSION THERAPY VISIT (OUTPATIENT)
Dept: INFUSION THERAPY | Facility: CLINIC | Age: 72
End: 2022-04-20
Attending: INTERNAL MEDICINE
Payer: MEDICARE

## 2022-04-20 ENCOUNTER — LAB (OUTPATIENT)
Dept: INFUSION THERAPY | Facility: CLINIC | Age: 72
End: 2022-04-20
Attending: PHYSICIAN ASSISTANT
Payer: MEDICARE

## 2022-04-20 VITALS — DIASTOLIC BLOOD PRESSURE: 73 MMHG | TEMPERATURE: 98 F | SYSTOLIC BLOOD PRESSURE: 135 MMHG | HEART RATE: 67 BPM

## 2022-04-20 DIAGNOSIS — C49.9 SPINDLE CELL SARCOMA (H): ICD-10-CM

## 2022-04-20 DIAGNOSIS — T45.1X5A CHEMOTHERAPY-INDUCED NAUSEA: ICD-10-CM

## 2022-04-20 DIAGNOSIS — R11.0 CHEMOTHERAPY-INDUCED NAUSEA: ICD-10-CM

## 2022-04-20 DIAGNOSIS — C49.9 SARCOMA (H): Primary | ICD-10-CM

## 2022-04-20 LAB
ALBUMIN SERPL-MCNC: 3.2 G/DL (ref 3.4–5)
ALP SERPL-CCNC: 118 U/L (ref 40–150)
ALT SERPL W P-5'-P-CCNC: 18 U/L (ref 0–70)
ANION GAP SERPL CALCULATED.3IONS-SCNC: 6 MMOL/L (ref 3–14)
AST SERPL W P-5'-P-CCNC: 16 U/L (ref 0–45)
BASOPHILS # BLD AUTO: 0 10E3/UL (ref 0–0.2)
BASOPHILS NFR BLD AUTO: 0 %
BILIRUB SERPL-MCNC: 0.3 MG/DL (ref 0.2–1.3)
BUN SERPL-MCNC: 19 MG/DL (ref 7–30)
CALCIUM SERPL-MCNC: 9.4 MG/DL (ref 8.5–10.1)
CHLORIDE BLD-SCNC: 107 MMOL/L (ref 94–109)
CO2 SERPL-SCNC: 26 MMOL/L (ref 20–32)
CREAT SERPL-MCNC: 1.36 MG/DL (ref 0.66–1.25)
EOSINOPHIL # BLD AUTO: 0.1 10E3/UL (ref 0–0.7)
EOSINOPHIL NFR BLD AUTO: 2 %
ERYTHROCYTE [DISTWIDTH] IN BLOOD BY AUTOMATED COUNT: 15 % (ref 10–15)
GFR SERPL CREATININE-BSD FRML MDRD: 55 ML/MIN/1.73M2
GLUCOSE BLD-MCNC: 151 MG/DL (ref 70–99)
HCT VFR BLD AUTO: 31.7 % (ref 40–53)
HGB BLD-MCNC: 9.9 G/DL (ref 13.3–17.7)
LYMPHOCYTES # BLD AUTO: 1.7 10E3/UL (ref 0.8–5.3)
LYMPHOCYTES NFR BLD AUTO: 27 %
MAGNESIUM SERPL-MCNC: 2 MG/DL (ref 1.6–2.3)
MCH RBC QN AUTO: 30.6 PG (ref 26.5–33)
MCHC RBC AUTO-ENTMCNC: 31.2 G/DL (ref 31.5–36.5)
MCV RBC AUTO: 98 FL (ref 78–100)
MONOCYTES # BLD AUTO: 0.2 10E3/UL (ref 0–1.3)
MONOCYTES NFR BLD AUTO: 4 %
NEUTROPHILS # BLD AUTO: 4.2 10E3/UL (ref 1.6–8.3)
NEUTROPHILS NFR BLD AUTO: 68 %
PHOSPHATE SERPL-MCNC: 2.1 MG/DL (ref 2.5–4.5)
PLATELET # BLD AUTO: 173 10E3/UL (ref 150–450)
POTASSIUM BLD-SCNC: 3.6 MMOL/L (ref 3.4–5.3)
PROT SERPL-MCNC: 6.7 G/DL (ref 6.8–8.8)
RBC # BLD AUTO: 3.24 10E6/UL (ref 4.4–5.9)
SODIUM SERPL-SCNC: 139 MMOL/L (ref 133–144)
WBC # BLD AUTO: 6.2 10E3/UL (ref 4–11)

## 2022-04-20 PROCEDURE — 36591 DRAW BLOOD OFF VENOUS DEVICE: CPT

## 2022-04-20 PROCEDURE — 96360 HYDRATION IV INFUSION INIT: CPT

## 2022-04-20 PROCEDURE — 250N000011 HC RX IP 250 OP 636: Performed by: PHYSICIAN ASSISTANT

## 2022-04-20 PROCEDURE — 84100 ASSAY OF PHOSPHORUS: CPT | Performed by: PHYSICIAN ASSISTANT

## 2022-04-20 PROCEDURE — 80053 COMPREHEN METABOLIC PANEL: CPT | Performed by: PHYSICIAN ASSISTANT

## 2022-04-20 PROCEDURE — 83735 ASSAY OF MAGNESIUM: CPT | Performed by: PHYSICIAN ASSISTANT

## 2022-04-20 PROCEDURE — 258N000003 HC RX IP 258 OP 636: Performed by: PHYSICIAN ASSISTANT

## 2022-04-20 PROCEDURE — 85025 COMPLETE CBC W/AUTO DIFF WBC: CPT | Performed by: PHYSICIAN ASSISTANT

## 2022-04-20 RX ORDER — OLANZAPINE 5 MG/1
5 TABLET ORAL AT BEDTIME
Qty: 30 TABLET | Refills: 1 | Status: SHIPPED | OUTPATIENT
Start: 2022-04-20 | End: 2022-05-11

## 2022-04-20 RX ORDER — HEPARIN SODIUM (PORCINE) LOCK FLUSH IV SOLN 100 UNIT/ML 100 UNIT/ML
5 SOLUTION INTRAVENOUS
Status: CANCELLED
Start: 2022-04-20

## 2022-04-20 RX ORDER — HEPARIN SODIUM (PORCINE) LOCK FLUSH IV SOLN 100 UNIT/ML 100 UNIT/ML
5 SOLUTION INTRAVENOUS
Status: COMPLETED | OUTPATIENT
Start: 2022-04-20 | End: 2022-04-20

## 2022-04-20 RX ADMIN — SODIUM CHLORIDE 1000 ML: 9 INJECTION, SOLUTION INTRAVENOUS at 08:37

## 2022-04-20 RX ADMIN — Medication 5 ML: at 09:41

## 2022-04-20 NOTE — TELEPHONE ENCOUNTER
[facility]    Last prescribing provider: Myrna    Last clinic visit date: 4/12/22 Mariella    Any missed appointments or no-shows since last clinic visit?: No    Recommendations for requested medication (if none, N/A): NA    Next clinic visit date: 5/4/22 Gabriel    Any other pertinent information (if none, N/A): Copied from Maynor Rios note of 3/15/22:  Maynor responded he should get labs today or tomorrow (he has labs for Thursday) and he can start his zyprexa back up tonight.    Routing to Dr Wolff

## 2022-04-20 NOTE — PROGRESS NOTES
Infusion Nursing Note:  Ifrah Huitron presents today for pac labs and fluids.    Patient seen by provider today: No   present during visit today: Not Applicable.    Note: N/A.      Intravenous Access:  Labs drawn without difficulty.  Implanted Port.    Treatment Conditions:  Not Applicable.      Post Infusion Assessment:  Patient tolerated infusion without incident.  Blood return noted pre and post infusion.  Site patent and intact, free from redness, edema or discomfort.  No evidence of extravasations.  Access discontinued per protocol.       Discharge Plan:   Patient discharged in stable condition accompanied by: self.  Departure Mode: Ambulatory.  Pt to return on 4/22/22 at 11:15 am for pac labs followed by infusion.    Cathy Mann RN

## 2022-04-22 ENCOUNTER — LAB (OUTPATIENT)
Dept: INFUSION THERAPY | Facility: CLINIC | Age: 72
End: 2022-04-22
Attending: PHYSICIAN ASSISTANT
Payer: MEDICARE

## 2022-04-22 ENCOUNTER — TELEPHONE (OUTPATIENT)
Dept: ONCOLOGY | Facility: CLINIC | Age: 72
End: 2022-04-22

## 2022-04-22 DIAGNOSIS — C49.9 SPINDLE CELL SARCOMA (H): ICD-10-CM

## 2022-04-22 DIAGNOSIS — T45.1X5A CHEMOTHERAPY-INDUCED NAUSEA: Primary | ICD-10-CM

## 2022-04-22 DIAGNOSIS — R11.0 CHEMOTHERAPY-INDUCED NAUSEA: ICD-10-CM

## 2022-04-22 DIAGNOSIS — T45.1X5A CHEMOTHERAPY-INDUCED NAUSEA: ICD-10-CM

## 2022-04-22 DIAGNOSIS — R11.0 CHEMOTHERAPY-INDUCED NAUSEA: Primary | ICD-10-CM

## 2022-04-22 DIAGNOSIS — C49.9 SARCOMA (H): Primary | ICD-10-CM

## 2022-04-22 LAB
ALBUMIN SERPL-MCNC: 3.2 G/DL (ref 3.4–5)
ALP SERPL-CCNC: 118 U/L (ref 40–150)
ALT SERPL W P-5'-P-CCNC: 19 U/L (ref 0–70)
ANION GAP SERPL CALCULATED.3IONS-SCNC: 5 MMOL/L (ref 3–14)
AST SERPL W P-5'-P-CCNC: 19 U/L (ref 0–45)
BASOPHILS # BLD MANUAL: 0.1 10E3/UL (ref 0–0.2)
BASOPHILS NFR BLD MANUAL: 2 %
BILIRUB SERPL-MCNC: 0.3 MG/DL (ref 0.2–1.3)
BUN SERPL-MCNC: 19 MG/DL (ref 7–30)
CALCIUM SERPL-MCNC: 9.5 MG/DL (ref 8.5–10.1)
CHLORIDE BLD-SCNC: 110 MMOL/L (ref 94–109)
CO2 SERPL-SCNC: 27 MMOL/L (ref 20–32)
CREAT SERPL-MCNC: 1.31 MG/DL (ref 0.66–1.25)
EOSINOPHIL # BLD MANUAL: 0 10E3/UL (ref 0–0.7)
EOSINOPHIL NFR BLD MANUAL: 0 %
ERYTHROCYTE [DISTWIDTH] IN BLOOD BY AUTOMATED COUNT: 15.1 % (ref 10–15)
GFR SERPL CREATININE-BSD FRML MDRD: 57 ML/MIN/1.73M2
GLUCOSE BLD-MCNC: 88 MG/DL (ref 70–99)
HCT VFR BLD AUTO: 30.6 % (ref 40–53)
HGB BLD-MCNC: 9.8 G/DL (ref 13.3–17.7)
LYMPHOCYTES # BLD MANUAL: 1.2 10E3/UL (ref 0.8–5.3)
LYMPHOCYTES NFR BLD MANUAL: 24 %
MAGNESIUM SERPL-MCNC: 2.2 MG/DL (ref 1.6–2.3)
MCH RBC QN AUTO: 31.1 PG (ref 26.5–33)
MCHC RBC AUTO-ENTMCNC: 32 G/DL (ref 31.5–36.5)
MCV RBC AUTO: 97 FL (ref 78–100)
MONOCYTES # BLD MANUAL: 0.2 10E3/UL (ref 0–1.3)
MONOCYTES NFR BLD MANUAL: 5 %
NEUTROPHILS # BLD MANUAL: 3.3 10E3/UL (ref 1.6–8.3)
NEUTROPHILS NFR BLD MANUAL: 69 %
PHOSPHATE SERPL-MCNC: 2 MG/DL (ref 2.5–4.5)
PLAT MORPH BLD: NORMAL
PLATELET # BLD AUTO: 145 10E3/UL (ref 150–450)
POTASSIUM BLD-SCNC: 3.4 MMOL/L (ref 3.4–5.3)
PROT SERPL-MCNC: 6.6 G/DL (ref 6.8–8.8)
RBC # BLD AUTO: 3.15 10E6/UL (ref 4.4–5.9)
RBC MORPH BLD: NORMAL
SODIUM SERPL-SCNC: 142 MMOL/L (ref 133–144)
WBC # BLD AUTO: 4.8 10E3/UL (ref 4–11)

## 2022-04-22 PROCEDURE — 96360 HYDRATION IV INFUSION INIT: CPT

## 2022-04-22 PROCEDURE — 84100 ASSAY OF PHOSPHORUS: CPT | Performed by: PHYSICIAN ASSISTANT

## 2022-04-22 PROCEDURE — 258N000003 HC RX IP 258 OP 636: Performed by: PHYSICIAN ASSISTANT

## 2022-04-22 PROCEDURE — 83735 ASSAY OF MAGNESIUM: CPT | Performed by: PHYSICIAN ASSISTANT

## 2022-04-22 PROCEDURE — 36591 DRAW BLOOD OFF VENOUS DEVICE: CPT

## 2022-04-22 PROCEDURE — 80053 COMPREHEN METABOLIC PANEL: CPT | Performed by: PHYSICIAN ASSISTANT

## 2022-04-22 PROCEDURE — 250N000011 HC RX IP 250 OP 636: Performed by: PHYSICIAN ASSISTANT

## 2022-04-22 PROCEDURE — 85027 COMPLETE CBC AUTOMATED: CPT | Performed by: PHYSICIAN ASSISTANT

## 2022-04-22 RX ORDER — HEPARIN SODIUM (PORCINE) LOCK FLUSH IV SOLN 100 UNIT/ML 100 UNIT/ML
5 SOLUTION INTRAVENOUS
Status: DISCONTINUED | OUTPATIENT
Start: 2022-04-22 | End: 2022-04-22 | Stop reason: HOSPADM

## 2022-04-22 RX ORDER — HEPARIN SODIUM (PORCINE) LOCK FLUSH IV SOLN 100 UNIT/ML 100 UNIT/ML
5 SOLUTION INTRAVENOUS
Status: CANCELLED
Start: 2022-04-22

## 2022-04-22 RX ADMIN — SODIUM CHLORIDE, PRESERVATIVE FREE 5 ML: 5 INJECTION INTRAVENOUS at 12:26

## 2022-04-22 RX ADMIN — SODIUM CHLORIDE 1000 ML: 9 INJECTION, SOLUTION INTRAVENOUS at 11:39

## 2022-04-22 NOTE — PROGRESS NOTES
Infusion Nursing Note:  Ifrah Huitron presents today for IVF.    Patient seen by provider today: No   present during visit today: Not Applicable.    Note: Pt states he's feeling weak and run dwen today. He's requesting fluids regardless for cr. Stating he thinks he's dehydrated and always feels better after fluids.       Intravenous Access:  Peripheral IV placed.    Treatment Conditions:    Cr 1.31  Results reviewed, labs MET treatment parameters, ok to proceed with treatment.      Post Infusion Assessment:  Patient tolerated infusion without incident.  Blood return noted pre and post infusion.  Site patent and intact, free from redness, edema or discomfort.  No evidence of extravasations.  Access discontinued per protocol.       Discharge Plan:   Discharge instructions reviewed with: Patient.  Patient and/or family verbalized understanding of discharge instructions and all questions answered.  Patient discharged in stable condition accompanied by: self.  Departure Mode: Ambulatory.      Sarahi Astudillo RN

## 2022-04-22 NOTE — TELEPHONE ENCOUNTER
4/22/22    Ifrah was called today to check in. He hasn't been feeling well. He feels better after IV fluids. He does feel he has been dehydrated. He had diarrhea, which is now resolved the past few days. He had generally upset stomach and general malaise. He is sleeping a lot. He has had worsening SOB with exertion, such as going upstairs. Has had some dizziness. The thrush is really limiting his oral intake. He is about 50% better today compared to earlier in the week.    His creat is still elevated but he had fluids today so it should improve. His phos is lower -he will restart phosphorus supplement 500mg daily (2 of the 250mg tablets).     I would like him to have an in person exam given so many issues. Will message Malorie DAVIS to see him in person next week. Need someone to look in his mouth and listen to his lungs along with getting vitals. Hopefully can do this with a fluid appointment.     Maynor Rios PA-C

## 2022-04-25 ENCOUNTER — VIRTUAL VISIT (OUTPATIENT)
Dept: ENDOCRINOLOGY | Facility: CLINIC | Age: 72
End: 2022-04-25
Payer: MEDICARE

## 2022-04-25 DIAGNOSIS — E23.0 HYPOPITUITARISM (H): Primary | ICD-10-CM

## 2022-04-25 DIAGNOSIS — R11.0 NAUSEA: ICD-10-CM

## 2022-04-25 DIAGNOSIS — E03.8 CENTRAL HYPOTHYROIDISM: ICD-10-CM

## 2022-04-25 DIAGNOSIS — Z98.890 HISTORY OF PITUITARY SURGERY: ICD-10-CM

## 2022-04-25 DIAGNOSIS — C49.9 SARCOMA (H): ICD-10-CM

## 2022-04-25 DIAGNOSIS — R63.4 WEIGHT LOSS: ICD-10-CM

## 2022-04-25 DIAGNOSIS — R53.83 FATIGUE, UNSPECIFIED TYPE: ICD-10-CM

## 2022-04-25 DIAGNOSIS — E27.49 SECONDARY ADRENAL INSUFFICIENCY (H): ICD-10-CM

## 2022-04-25 PROCEDURE — 99215 OFFICE O/P EST HI 40 MIN: CPT | Mod: 95 | Performed by: INTERNAL MEDICINE

## 2022-04-25 RX ORDER — LEVOTHYROXINE SODIUM 75 UG/1
75 TABLET ORAL
COMMUNITY
Start: 2021-12-30 | End: 2022-05-11

## 2022-04-25 NOTE — LETTER
"    4/25/2022         RE: Ifrah Huitron  [address]  [facility] [address]        Dear Colleague,    Thank you for referring your patient, Ifrah Huitron, to the [facility]. Please see a copy of my visit note below.    Video visit    Start time 3:06, stop time 3:46; additional 14 minutes spent on the date of the encounter doing chart review, documentation and further activities as noted.     Provider location: [facility], [address] [place] [address] [address], [place] [address]    Patient location: patient home    Mode of transmission: video     Subjective:    Established patient, he followed with [facility] endocrinologists for many years, and then saw Dr. Grant once 11/2021     Ifrah Huitron is a 71 year old male who presents for hypopituitarism.     CT head obtained 2010 due to headaches with finding of a sellar lesion and subsequent MRI 8/22/2010 [place]) per OSH radiology read: \"22.9 mm enhancing lesion within the sella, the lesion abuts the carotid siphons, but does not grossly encase them.  Mass effect on the right carotid siphon is slightly more pronounced than the mass effect on the left, the lesion abuts the right aspect of the optic chiasm without janneth distortion of the chiasm.\" Pre-op Ifrah does not recall vision loss from the pituitary tumor.     S/p transsphenoidal pituitary surgery 8/23/2010 done by Dr. Khanna and Dr. Fontana at [facility] in [place]. I read the operative report and the surgeons suspected that there was normal residual pituitary left behind. Per the OSH pathology report the specimen was consistent with a pituitary adenoma and the IHC stains were negative for: FSH, LH, ACTH, TSH, prolactin, GH.     OSH MRI brain ([facility]) 6/30/2016: radiology read \"Postoperative changes related to transsphenoidal resection of a previously noted bulky intrasellar tumor there is a concave morphology to the mixed signal tissue along the floor of the sella. " "Presumed normal pituitary tissue is noted within the left aspect of the sella. The infundibulum is deviated to the left. Nothing to suggest residual or recurrent tumor. Normal cavernous sinuses. Normal optic chiasm.\"     Most recent imaging MRI brain 9/29/2021 ([facility]):  -per radiology: \"sella region appears stable in the interval\" compared to prior MRI study 5/23/2019, I reached out directly to radiology to get more details re the sella on his study   -my review: cuts through the sella are very thick, there appears to be residual pituitary tissue and non-specific post-surgical changes in the sella     Currently: no peripheral vision abnormalities. He has chronic headaches that are unchanged.     FSH/LH:  -most recent total testosterone 311 ng/dL (ref range 241-826 ng/dL) 9/2020 and this was collected at 11:20 AM, total testosterone was always normal on many draws over the years except it was low when checked a few weeks post-op in 2010  -He has never been on testosterone therapy    ACTH:  -Hydrocortisone started prior to pituitary surgery due to suspected secondary adrenal insufficiency, it is unclear to me what his baseline cortisol was prior to starting HC although there was a serum cortisol value of 1 mcg/dL (drawn at 11:53 AM) with concomitant ACTH 8 pg/mL pre-op in 2010  -He had an ACTH of 30 pg/mL in 2018   -Current dose of hydrocortisone: 20 mg about 1 hour after waking and then 10 mg around 5 - 7 PM  -In the setting of his cancer therapy he has lost about 20 # over the past 1 year, he also has chronic nausea, chronic fatigue [his is most fatigued in the early afternoon]     TSH:  -Thyroid hormone was started prior to pituitary surgery due to suspected central hypothyroidism   -2/4/2022: free T4 1.45 (ref range 0.76 - 1.46 ng/dL), TSH 0.21   -Chronic stable dose of levothyroxine 75 mcg, once daily, 7 days/week, he takes it appropriately to maximize absorption    -Current weight ~140 #, " "5'7\"    Prolactin:  -most recently 9/2020: prolactin normal and always normal over the years including pre-op  -no galactorrhea     GH:  -most recently 9/2020: IGF-1 43 (ref range 53 - 222 ng/mL) with Z-score -2.3 (ref range -2.0 - 2.0 S.D.)  -no increase in shoe/ring size     DI:   -He developed transient post-operative DI  -Serum sodium has remained normal, most recently 4/22/2022  -No polyuria     History is notable for a single episode of nephrolithiasis (remote, around the year 2000). No prior hypercalcemia. Serum calcium normal most recently 4/22/2022.     No HbA1c. Random glucose normal 4/22/2022. No history of DM.     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 Treatment included Keytruda 6/2021 - 10/2021 and combination chemotherapy was started 10/2021 and due to toxicity he started Doxil alone 3/2022 (done as an outpatient, one infusion monthly). He follows with Dr. Wolff. He has a hoarse voice due to the mediastinal mass impairing vocal fold motion and follows with ENT.      Father had nephrolithiasis. No family members with pituitary disease. No pancreatic pathology in the family. No FH of MEN.      Objective:    Video visit. Appears fatigued but pleasant and conversational.    Assessment/Plan:    # Non-functional pituitary macroadenoma, s/p transsphenoidal resection in 2010    I did reach out to radiology re the read on his MRI brain 9/29/2021 but on my review there is no overt structural recurrence/residual disease.    # Secondary adrenal insufficiency    We reviewed that he can change his time of administration of hydrocortisone to see if this makes a difference.  I recommend that he tries either hydrocortisone 20 mg as soon as he wakes up in the morning and 10 mg about 6 hours " later around midday or he can try hydrocortisone 15 mg as soon as he wakes up in the morning, 10 mg about 6 hours later around midday, and 5 mg in the late afternoon.    He should double his dose of hydrocortisone when sick and on the day of his chemotherapy infusion and for 2 days after.    He does have an emergency hydrocortisone injection kit at home and we reviewed that he will give 100 mg when he is ill and cannot take hydrocortisone by mouth and then call 911.  We also reviewed that he will require intravenous hydrocortisone with taper for any surgery or procedure.    We also reviewed that he should have a medical alert bracelet that states he has hypopituitarism, adrenal insufficiency, and hypothyroidism, and he will obtain this.    Return to see me in a month or two to review if these changes are helpful.     # Central hypothyroidism    We will check a free T4 level with his next lab draw tomorrow.  He does take levothyroxine appropriately to maximize absorption.  We reviewed that with his weight loss in the setting of cancer and chemotherapy that he may need to reduce his dose over time.      Again, thank you for allowing me to participate in the care of your patient.        Sincerely,        Elpidio Tao MD

## 2022-04-25 NOTE — PROGRESS NOTES
"Video visit    Start time 3:06, stop time 3:46; additional 14 minutes spent on the date of the encounter doing chart review, documentation and further activities as noted.     Provider location: [facility], [address] [place] [address] [address], [place] [address]    Patient location: patient home    Mode of transmission: video     Subjective:    Established patient, he followed with [facility] endocrinologists for many years, and then saw Dr. Grant once 11/2021     Ifrah Huitron is a 71 year old male who presents for hypopituitarism.     CT head obtained 2010 due to headaches with finding of a sellar lesion and subsequent MRI 8/22/2010 ([facility]) per OSH radiology read: \"22.9 mm enhancing lesion within the sella, the lesion abuts the carotid siphons, but does not grossly encase them.  Mass effect on the right carotid siphon is slightly more pronounced than the mass effect on the left, the lesion abuts the right aspect of the optic chiasm without janneth distortion of the chiasm.\" Pre-op Ifrah does not recall vision loss from the pituitary tumor.     S/p transsphenoidal pituitary surgery 8/23/2010 done by Dr. Khanna and Dr. Fontana at [facility] in [place]. I read the operative report and the surgeons suspected that there was normal residual pituitary left behind. Per the OSH pathology report the specimen was consistent with a pituitary adenoma and the IHC stains were negative for: FSH, LH, ACTH, TSH, prolactin, GH.     OSH MRI brain ([facility]) 6/30/2016: radiology read \"Postoperative changes related to transsphenoidal resection of a previously noted bulky intrasellar tumor there is a concave morphology to the mixed signal tissue along the floor of the sella. Presumed normal pituitary tissue is noted within the left aspect of the sella. The infundibulum is deviated to the left. Nothing to suggest residual or recurrent tumor. Normal cavernous sinuses. Normal optic chiasm.\"     Most recent imaging MRI brain " "9/29/2021 ([facility]):  -per radiology: \"sella region appears stable in the interval\" compared to prior MRI study 5/23/2019, I reached out directly to radiology to get more details re the sella on his study   -my review: cuts through the sella are very thick, there appears to be residual pituitary tissue and non-specific post-surgical changes in the sella     Currently: no peripheral vision abnormalities. He has chronic headaches that are unchanged.     FSH/LH:  -most recent total testosterone 311 ng/dL (ref range 241-826 ng/dL) 9/2020 and this was collected at 11:20 AM, total testosterone was always normal on many draws over the years except it was low when checked a few weeks post-op in 2010  -He has never been on testosterone therapy    ACTH:  -Hydrocortisone started prior to pituitary surgery due to suspected secondary adrenal insufficiency, it is unclear to me what his baseline cortisol was prior to starting HC although there was a serum cortisol value of 1 mcg/dL (drawn at 11:53 AM) with concomitant ACTH 8 pg/mL pre-op in 2010  -He had an ACTH of 30 pg/mL in 2018   -Current dose of hydrocortisone: 20 mg about 1 hour after waking and then 10 mg around 5 - 7 PM  -In the setting of his cancer therapy he has lost about 20 # over the past 1 year, he also has chronic nausea, chronic fatigue [his is most fatigued in the early afternoon]     TSH:  -Thyroid hormone was started prior to pituitary surgery due to suspected central hypothyroidism   -2/4/2022: free T4 1.45 (ref range 0.76 - 1.46 ng/dL), TSH 0.21   -Chronic stable dose of levothyroxine 75 mcg, once daily, 7 days/week, he takes it appropriately to maximize absorption    -Current weight ~140 #, 5'7\"    Prolactin:  -most recently 9/2020: prolactin normal and always normal over the years including pre-op  -no galactorrhea     GH:  -most recently 9/2020: IGF-1 43 (ref range 53 - 222 ng/mL) with Z-score -2.3 (ref range -2.0 - 2.0 S.D.)  -no " increase in shoe/ring size     DI:   -He developed transient post-operative DI  -Serum sodium has remained normal, most recently 4/22/2022  -No polyuria     History is notable for a single episode of nephrolithiasis (remote, around the year 2000). No prior hypercalcemia. Serum calcium normal most recently 4/22/2022.     No HbA1c. Random glucose normal 4/22/2022. No history of DM.     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 Treatment included Keytruda 6/2021 - 10/2021 and combination chemotherapy was started 10/2021 and due to toxicity he started Doxil alone 3/2022 (done as an outpatient, one infusion monthly). He follows with Dr. Wolff. He has a hoarse voice due to the mediastinal mass impairing vocal fold motion and follows with ENT.      Father had nephrolithiasis. No family members with pituitary disease. No pancreatic pathology in the family. No FH of MEN.      Objective:    Video visit. Appears fatigued but pleasant and conversational.    Assessment/Plan:    # Non-functional pituitary macroadenoma, s/p transsphenoidal resection in 2010    I did reach out to radiology re the read on his MRI brain 9/29/2021 but on my review there is no overt structural recurrence/residual disease.    # Secondary adrenal insufficiency    We reviewed that he can change his time of administration of hydrocortisone to see if this makes a difference.  I recommend that he tries either hydrocortisone 20 mg as soon as he wakes up in the morning and 10 mg about 6 hours later around midday or he can try hydrocortisone 15 mg as soon as he wakes up in the morning, 10 mg about 6 hours later around midday, and 5 mg in the late afternoon.    He should double his dose of hydrocortisone when sick and on the day of his  chemotherapy infusion and for 2 days after.    He does have an emergency hydrocortisone injection kit at home and we reviewed that he will give 100 mg when he is ill and cannot take hydrocortisone by mouth and then call 911.  We also reviewed that he will require intravenous hydrocortisone with taper for any surgery or procedure.    We also reviewed that he should have a medical alert bracelet that states he has hypopituitarism, adrenal insufficiency, and hypothyroidism, and he will obtain this.    Return to see me in a month or two to review if these changes are helpful.     # Central hypothyroidism    We will check a free T4 level with his next lab draw tomorrow.  He does take levothyroxine appropriately to maximize absorption.  We reviewed that with his weight loss in the setting of cancer and chemotherapy that he may need to reduce his dose over time.

## 2022-04-26 ENCOUNTER — ONCOLOGY VISIT (OUTPATIENT)
Dept: ONCOLOGY | Facility: CLINIC | Age: 72
End: 2022-04-26
Attending: NURSE PRACTITIONER
Payer: MEDICARE

## 2022-04-26 ENCOUNTER — LAB (OUTPATIENT)
Dept: INFUSION THERAPY | Facility: CLINIC | Age: 72
End: 2022-04-26
Attending: PHYSICIAN ASSISTANT
Payer: MEDICARE

## 2022-04-26 ENCOUNTER — PATIENT OUTREACH (OUTPATIENT)
Dept: ONCOLOGY | Facility: CLINIC | Age: 72
End: 2022-04-26

## 2022-04-26 ENCOUNTER — INFUSION THERAPY VISIT (OUTPATIENT)
Dept: INFUSION THERAPY | Facility: CLINIC | Age: 72
End: 2022-04-26
Attending: NURSE PRACTITIONER
Payer: MEDICARE

## 2022-04-26 VITALS
SYSTOLIC BLOOD PRESSURE: 152 MMHG | RESPIRATION RATE: 18 BRPM | HEIGHT: 67 IN | WEIGHT: 141.2 LBS | TEMPERATURE: 97.9 F | BODY MASS INDEX: 22.16 KG/M2 | HEART RATE: 70 BPM | OXYGEN SATURATION: 99 % | DIASTOLIC BLOOD PRESSURE: 82 MMHG

## 2022-04-26 DIAGNOSIS — E27.49 SECONDARY ADRENAL INSUFFICIENCY (H): ICD-10-CM

## 2022-04-26 DIAGNOSIS — K12.31 MUCOSITIS DUE TO CHEMOTHERAPY: Primary | ICD-10-CM

## 2022-04-26 DIAGNOSIS — B37.0 CANDIDIASIS OF MOUTH: ICD-10-CM

## 2022-04-26 DIAGNOSIS — E23.0 HYPOPITUITARISM (H): ICD-10-CM

## 2022-04-26 DIAGNOSIS — E83.39 HYPOPHOSPHATEMIA: ICD-10-CM

## 2022-04-26 DIAGNOSIS — K12.31 MUCOSITIS DUE TO CHEMOTHERAPY: ICD-10-CM

## 2022-04-26 DIAGNOSIS — E86.0 DEHYDRATION: ICD-10-CM

## 2022-04-26 DIAGNOSIS — C49.9 SARCOMA (H): ICD-10-CM

## 2022-04-26 DIAGNOSIS — R11.0 CHEMOTHERAPY-INDUCED NAUSEA: Primary | ICD-10-CM

## 2022-04-26 DIAGNOSIS — E87.6 HYPOKALEMIA: ICD-10-CM

## 2022-04-26 DIAGNOSIS — T45.1X5A CHEMOTHERAPY-INDUCED NAUSEA: Primary | ICD-10-CM

## 2022-04-26 DIAGNOSIS — C49.9 SPINDLE CELL SARCOMA (H): ICD-10-CM

## 2022-04-26 DIAGNOSIS — C49.9 SPINDLE CELL SARCOMA (H): Primary | ICD-10-CM

## 2022-04-26 DIAGNOSIS — E03.8 CENTRAL HYPOTHYROIDISM: ICD-10-CM

## 2022-04-26 LAB
ALBUMIN SERPL-MCNC: 3.3 G/DL (ref 3.4–5)
ALP SERPL-CCNC: 129 U/L (ref 40–150)
ALT SERPL W P-5'-P-CCNC: 21 U/L (ref 0–70)
ANION GAP SERPL CALCULATED.3IONS-SCNC: 6 MMOL/L (ref 3–14)
AST SERPL W P-5'-P-CCNC: 20 U/L (ref 0–45)
BASOPHILS # BLD AUTO: 0 10E3/UL (ref 0–0.2)
BASOPHILS NFR BLD AUTO: 1 %
BILIRUB SERPL-MCNC: 0.3 MG/DL (ref 0.2–1.3)
BUN SERPL-MCNC: 21 MG/DL (ref 7–30)
CALCIUM SERPL-MCNC: 9.3 MG/DL (ref 8.5–10.1)
CHLORIDE BLD-SCNC: 106 MMOL/L (ref 94–109)
CO2 SERPL-SCNC: 28 MMOL/L (ref 20–32)
CREAT SERPL-MCNC: 1.25 MG/DL (ref 0.66–1.25)
EOSINOPHIL # BLD AUTO: 0.1 10E3/UL (ref 0–0.7)
EOSINOPHIL NFR BLD AUTO: 1 %
ERYTHROCYTE [DISTWIDTH] IN BLOOD BY AUTOMATED COUNT: 14.9 % (ref 10–15)
GFR SERPL CREATININE-BSD FRML MDRD: 61 ML/MIN/1.73M2
GLUCOSE BLD-MCNC: 133 MG/DL (ref 70–99)
HCT VFR BLD AUTO: 30.7 % (ref 40–53)
HGB BLD-MCNC: 10 G/DL (ref 13.3–17.7)
IMM GRANULOCYTES # BLD: 0 10E3/UL
IMM GRANULOCYTES NFR BLD: 0 %
LYMPHOCYTES # BLD AUTO: 1.3 10E3/UL (ref 0.8–5.3)
LYMPHOCYTES NFR BLD AUTO: 29 %
MAGNESIUM SERPL-MCNC: 2 MG/DL (ref 1.6–2.3)
MCH RBC QN AUTO: 31 PG (ref 26.5–33)
MCHC RBC AUTO-ENTMCNC: 32.6 G/DL (ref 31.5–36.5)
MCV RBC AUTO: 95 FL (ref 78–100)
MONOCYTES # BLD AUTO: 0.4 10E3/UL (ref 0–1.3)
MONOCYTES NFR BLD AUTO: 9 %
NEUTROPHILS # BLD AUTO: 2.7 10E3/UL (ref 1.6–8.3)
NEUTROPHILS NFR BLD AUTO: 60 %
NRBC # BLD AUTO: 0 10E3/UL
NRBC BLD AUTO-RTO: 0 /100
PHOSPHATE SERPL-MCNC: 3 MG/DL (ref 2.5–4.5)
PLATELET # BLD AUTO: 161 10E3/UL (ref 150–450)
POTASSIUM BLD-SCNC: 3.2 MMOL/L (ref 3.4–5.3)
PROT SERPL-MCNC: 6.9 G/DL (ref 6.8–8.8)
RBC # BLD AUTO: 3.23 10E6/UL (ref 4.4–5.9)
SODIUM SERPL-SCNC: 140 MMOL/L (ref 133–144)
T4 FREE SERPL-MCNC: 1.43 NG/DL (ref 0.76–1.46)
WBC # BLD AUTO: 4.5 10E3/UL (ref 4–11)

## 2022-04-26 PROCEDURE — 99215 OFFICE O/P EST HI 40 MIN: CPT | Performed by: NURSE PRACTITIONER

## 2022-04-26 PROCEDURE — 96360 HYDRATION IV INFUSION INIT: CPT

## 2022-04-26 PROCEDURE — 36591 DRAW BLOOD OFF VENOUS DEVICE: CPT

## 2022-04-26 PROCEDURE — 80053 COMPREHEN METABOLIC PANEL: CPT | Performed by: INTERNAL MEDICINE

## 2022-04-26 PROCEDURE — 250N000011 HC RX IP 250 OP 636: Performed by: PHYSICIAN ASSISTANT

## 2022-04-26 PROCEDURE — 258N000003 HC RX IP 258 OP 636: Performed by: PHYSICIAN ASSISTANT

## 2022-04-26 PROCEDURE — 84439 ASSAY OF FREE THYROXINE: CPT | Performed by: INTERNAL MEDICINE

## 2022-04-26 PROCEDURE — G0463 HOSPITAL OUTPT CLINIC VISIT: HCPCS | Mod: 25

## 2022-04-26 PROCEDURE — 84100 ASSAY OF PHOSPHORUS: CPT | Performed by: INTERNAL MEDICINE

## 2022-04-26 PROCEDURE — 85004 AUTOMATED DIFF WBC COUNT: CPT | Performed by: PHYSICIAN ASSISTANT

## 2022-04-26 PROCEDURE — 83735 ASSAY OF MAGNESIUM: CPT | Performed by: INTERNAL MEDICINE

## 2022-04-26 RX ORDER — HEPARIN SODIUM (PORCINE) LOCK FLUSH IV SOLN 100 UNIT/ML 100 UNIT/ML
5 SOLUTION INTRAVENOUS
Status: CANCELLED
Start: 2022-04-26

## 2022-04-26 RX ORDER — HEPARIN SODIUM (PORCINE) LOCK FLUSH IV SOLN 100 UNIT/ML 100 UNIT/ML
5 SOLUTION INTRAVENOUS
Status: COMPLETED | OUTPATIENT
Start: 2022-04-26 | End: 2022-04-26

## 2022-04-26 RX ADMIN — Medication 5 ML: at 09:49

## 2022-04-26 RX ADMIN — SODIUM CHLORIDE 1000 ML: 9 INJECTION, SOLUTION INTRAVENOUS at 08:32

## 2022-04-26 ASSESSMENT — PAIN SCALES - GENERAL: PAINLEVEL: NO PAIN (0)

## 2022-04-26 NOTE — PROGRESS NOTES
[facility] Hematology and Oncology Outpatient Progress Note    Patient: Ifrah Huitron  MRN: [id number]  Date of Service: 04/26/2022        Reason for Visit    1. Refractory thrush, dehydration  2. Spindle cell sarcoma, on chemo    Primary Oncologist: Dr. Wolff ([facility])/[facility]    Assessment/Plan  1. Metastatic spindle cell sarcoma  He is nearing the end of his last cycle of Doxil. He had a lot of recurrent side effects (detailed below) and is feeling better this week.     He follows up with his primary team for virtual reassessment next week to decide on his next cycle of Doxil.   Tentative plan is to repeat imaging to assess response after this next cycle, in late May.     2. Refractory thrush/mucositis  Recurs for 10-14 days after each cycle of Doxil, then improves for 5-7 days ahead of the next cycle. Describes as sore white coating and mouth sores on tongue/inner lips + sore throat. Currently, nearly resolved on exam.     Plan:  -Continue current treatments: baking soda/salt water rinses, fluconazole 100 mg daily, Nystatin and Helios  -Culture for HSV sent today to ensure he does not have a viral infection/co-infection requiring alternate treatment  -It appears an ENT referral was planned, but patient has not had yet. He will check with Maynor on status of this next week  -May need to consider higher-dose fluconazole (400 mg) or alternate antifungal tx (such as voriconazole) for refractory thrush with next cycle     3. Dehydration  4. Hypokalemia  5. Hypophosphatemia  Has been getting IV[facility] at [facility] 2 times/week, on average. He feels better with IVF. Creatinine is now improving.  On potassium 20 meq daily. Potassium is 3.2  Was given phos supplment last week, taking daily. Phos is now WNL    Plan:  -Take additional 20 meq potassium by mouth (40 meq total) x next 3 days, then continue 20 meq po daily  -Hold phosphorous supplement now and recheck next week.  -IVF scheduled  Thursday, he will keep this  -Will work on drinking more fluids on his own and eating small meals/snacks through day    6.   Nausea  Minimal this week. Managed with Zofran and compazine as needed.    7.   Diarrhea  Problematic after last cycle Doxil. Treated with Pepto Bismol and Imodium. This is now resolved.    8.   Hand/foot syndrome  Ices hands/feet during infusions. Emollients. Kenalog cream as needed.    9.  Rash  Presumed side effect of Doxil given cyclical recurrence. Has some healing lesions on knees, hips and right tricep area.     10. Exertional dyspnea  This appears to be related to fatigue/deconditioning and dehydration. Improved this week. No fevers, cough, cardiac symptoms. VSS. Lungs clear.    11.  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 Endocrinologist has suggested he increase his maintenance hydrocortisone doses x 3 days after chemo (stress dosing) to see if that helps his tolerance    ______________________________________________________________________________    History of Present Illness/ Interval History    Mr. Ifrah Huitron  is a 71 year old on palliative chemo for sarcoma, managed by Dr. Wolff and Maynor CHOW.   Had last cycle of Doxil 4/7, almost 3 weeks ago. Last cycle was slightly dose-reduced. I was asked to see him at the [facility] today for toxicity eval while here for fluids.     After each cycle of Doxil, he  struggles with a lot of fatigue, dehydration (requiring IVF and electrolyte replacement) and refractory mucositis/thrush (despite treatment with Nystatin and Healios). Fluconazole has been the most helpful. With last cycle, he was put on prophylactic fluconazole 100 mg daily, in addition to Nystatin, Healios and baking/salt water rinses. Despite this, he still had pretty severe recurrence right after Doxil that lasted 10 days and is gradually improving. It usually resolves before his next cycle of chemo. Describes  as a painful white coating over tongue, with pain in inner lips and throat as well. No fevers.     Dysgeusia. Poor appetite. Weight is up and down. Doing fair with fluids.  Minimal nausea without emesis, well-managed with Zofran and compazine. Better this week.   Had diarrhea the first week after chemo, treated with Imodium and Pepto Bismol.    Paronychia.   Hand/foot syndrome - icing hands/feet with Doxil infusions and using emollients. Occasional blisters on fingers. Kenalog cream as needed.   Linear, tender, blister-like rash on knees/hips and arms after each cycle of chemo currently healing.    Last week noted increased exertional dyspnea at baseline, resolved over last 2-3 days. Dypsnea seems to feel worse when feeling tired. No cough. No chest pain. No LE swelling.     ECOG Performance    1-2      Oncology History/Treatment  Diagnosis/Stage:   3/2021: Metastatic spindle cell sarcoma involving mediastinum, sub-diaphragm, lung  -presented to PCP with left shoulder/chest/back pain  -imaging: subdiaphragmatic mass, liver lesions, mediastinal mass with multiple lung mets  -mediastinal mass biopsy: spindle cell sarcoma. 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 Complicated by nausea and recurrent thrush  -C4: same dose reduction. Complicated by significant neurotoxicity, so only received 5.5 days. Symptoms resolved after stopping ifosfamide.   -CT showed response after 4 cycles    -3/10/2022 - present: single agent Doxil.   -C5 10% dose-reduced  -C6 additional 10% (20% total) dose-reduced      Physical Exam    GENERAL: Alert and oriented  to time place and person. Seated comfortably. In no distress.  HEAD: Atraumatic and normocephalic. Partial alopecia.  EYES: LANCE, EOMI. No erythema. No icterus.  ORAL CAVITY: Moist. A few focal areas of white mucositis on mid/posterior tongue. Mild erythema inner lips without lesions.   CHEST: clear to auscultation bilaterally. Resonant to percussion throughout bilaterally. Symmetrical breath movements bilaterally.  CVS:  Regular rate and rhythm.   ABDOMEN: Soft. Not tender. Not distended. No palpable hepatomegaly or splenomegaly. No other mass palpable. Bowel sounds present.  EXTREMITIES: Warm. No peripheral edema.  SKIN: Moderate erythema and dry skin hands. Healing blister right middle digit. Brittle nails. Healing linear erythematous lesions on inner knees. A few scabbed lesions right tricep, healing.   NEURO: No gross deficit noted. Non-antalgic gait.      Lab Results    Recent Results (from the past 168 hour(s))   Phosphorus   Result Value Ref Range    Phosphorus 2.1 (L) 2.5 - 4.5 mg/dL   Magnesium   Result Value Ref Range    Magnesium 2.0 1.6 - 2.3 mg/dL   Comprehensive metabolic panel   Result Value Ref Range    Sodium 139 133 - 144 mmol/L    Potassium 3.6 3.4 - 5.3 mmol/L    Chloride 107 94 - 109 mmol/L    Carbon Dioxide (CO2) 26 20 - 32 mmol/L    Anion Gap 6 3 - 14 mmol/L    Urea Nitrogen 19 7 - 30 mg/dL    Creatinine 1.36 (H) 0.66 - 1.25 mg/dL    Calcium 9.4 8.5 - 10.1 mg/dL    Glucose 151 (H) 70 - 99 mg/dL    Alkaline Phosphatase 118 40 - 150 U/L    AST 16 0 - 45 U/L    ALT 18 0 - 70 U/L    Protein Total 6.7 (L) 6.8 - 8.8 g/dL    Albumin 3.2 (L) 3.4 - 5.0 g/dL    Bilirubin Total 0.3 0.2 - 1.3 mg/dL    GFR Estimate 55 (L) >60 mL/min/1.73m2   CBC with platelets and differential   Result Value Ref Range    WBC Count 6.2 4.0 - 11.0 10e3/uL    RBC Count 3.24 (L) 4.40 - 5.90 10e6/uL    Hemoglobin 9.9 (L) 13.3 - 17.7 g/dL    Hematocrit 31.7 (L) 40.0 - 53.0 %    MCV 98 78 - 100 fL    MCH 30.6 26.5 - 33.0 pg     MCHC 31.2 (L) 31.5 - 36.5 g/dL    RDW 15.0 10.0 - 15.0 %    Platelet Count 173 150 - 450 10e3/uL    % Neutrophils 68 %    % Lymphocytes 27 %    % Monocytes 4 %    % Eosinophils 2 %    % Basophils 0 %    Absolute Neutrophils 4.2 1.6 - 8.3 10e3/uL    Absolute Lymphocytes 1.7 0.8 - 5.3 10e3/uL    Absolute Monocytes 0.2 0.0 - 1.3 10e3/uL    Absolute Eosinophils 0.1 0.0 - 0.7 10e3/uL    Absolute Basophils 0.0 0.0 - 0.2 10e3/uL   Phosphorus   Result Value Ref Range    Phosphorus 2.0 (L) 2.5 - 4.5 mg/dL   Magnesium   Result Value Ref Range    Magnesium 2.2 1.6 - 2.3 mg/dL   Comprehensive metabolic panel   Result Value Ref Range    Sodium 142 133 - 144 mmol/L    Potassium 3.4 3.4 - 5.3 mmol/L    Chloride 110 (H) 94 - 109 mmol/L    Carbon Dioxide (CO2) 27 20 - 32 mmol/L    Anion Gap 5 3 - 14 mmol/L    Urea Nitrogen 19 7 - 30 mg/dL    Creatinine 1.31 (H) 0.66 - 1.25 mg/dL    Calcium 9.5 8.5 - 10.1 mg/dL    Glucose 88 70 - 99 mg/dL    Alkaline Phosphatase 118 40 - 150 U/L    AST 19 0 - 45 U/L    ALT 19 0 - 70 U/L    Protein Total 6.6 (L) 6.8 - 8.8 g/dL    Albumin 3.2 (L) 3.4 - 5.0 g/dL    Bilirubin Total 0.3 0.2 - 1.3 mg/dL    GFR Estimate 57 (L) >60 mL/min/1.73m2   CBC with platelets and differential   Result Value Ref Range    WBC Count 4.8 4.0 - 11.0 10e3/uL    RBC Count 3.15 (L) 4.40 - 5.90 10e6/uL    Hemoglobin 9.8 (L) 13.3 - 17.7 g/dL    Hematocrit 30.6 (L) 40.0 - 53.0 %    MCV 97 78 - 100 fL    MCH 31.1 26.5 - 33.0 pg    MCHC 32.0 31.5 - 36.5 g/dL    RDW 15.1 (H) 10.0 - 15.0 %    Platelet Count 145 (L) 150 - 450 10e3/uL   Manual Differential   Result Value Ref Range    % Neutrophils 69 %    % Lymphocytes 24 %    % Monocytes 5 %    % Eosinophils 0 %    % Basophils 2 %    Absolute Neutrophils 3.3 1.6 - 8.3 10e3/uL    Absolute Lymphocytes 1.2 0.8 - 5.3 10e3/uL    Absolute Monocytes 0.2 0.0 - 1.3 10e3/uL    Absolute Eosinophils 0.0 0.0 - 0.7 10e3/uL    Absolute Basophils 0.1 0.0 - 0.2 10e3/uL    RBC Morphology Confirmed RBC  Indices     Platelet Assessment  Automated Count Confirmed. Platelet morphology is normal.     Automated Count Confirmed. Platelet morphology is normal.       Imaging    No results found.    Billing  Total time: 40 min; including review of EMR, reports, diagnostics and ordering/coordination of care    Signed by: Malorie Castrejon NP

## 2022-04-26 NOTE — PROGRESS NOTES
Infusion Nursing Note:  Ifrah Huitron presents today for IVF.    Patient seen by provider today: Yes: Malorie Castrejon NP: therefore, assessment deferred   present during visit today: Not Applicable.    Note: Patient states this is the best he has felt in a long time. However. he is still requesting IVF because he feels improved afterward.    Intravenous Access:  Implanted Port.    Treatment Conditions:  Lab Results   Component Value Date    HGB 10.0 (L) 04/26/2022    WBC 4.5 04/26/2022    ANEU 3.3 04/22/2022    ANEUTAUTO 2.7 04/26/2022     04/26/2022      Lab Results   Component Value Date     04/26/2022    POTASSIUM 3.2 (L) 04/26/2022    MAG 2.0 04/26/2022    CR 1.25 04/26/2022    COTY 9.3 04/26/2022    BILITOTAL 0.3 04/26/2022    ALBUMIN 3.3 (L) 04/26/2022    ALT 21 04/26/2022    AST 20 04/26/2022   K3.2: Per Malorie, increase at home potassium to 40mEq for 3 days.    Post Infusion Assessment:  Patient tolerated infusion without incident.  Blood return noted pre and post infusion.  Site patent and intact, free from redness, edema or discomfort.  No evidence of extravasations.  Access discontinued per protocol.     Discharge Plan:   Discharge instructions reviewed with: Patient.  Patient and/or family verbalized understanding of discharge instructions and all questions answered.  AVS to patient via [employer].  Patient will return 4/28/2022 for next appointment.   Patient discharged in stable condition accompanied by: self.  Departure Mode: Ambulatory.    Erin Lowe RN

## 2022-04-26 NOTE — PROGRESS NOTES
"Oncology Rooming Note    April 26, 2022 8:56 AM   Ifrah Huitron is a 71 year old male who presents for:    Chief Complaint   Patient presents with     Oncology Clinic Visit     Spindle cell sarcoma - Labs provider and infusion     Initial Vitals: BP (!) 152/82 (BP Location: Left arm, Patient Position: Sitting, Cuff Size: Adult Regular)   Pulse 70   Temp 97.9  F (36.6  C) (Oral)   Resp 18   Ht 1.702 m (5' 7\")   Wt 64 kg (141 lb 3.2 oz)   SpO2 99%   BMI 22.12 kg/m   Estimated body mass index is 22.12 kg/m  as calculated from the following:    Height as of this encounter: 1.702 m (5' 7\").    Weight as of this encounter: 64 kg (141 lb 3.2 oz). Body surface area is 1.74 meters squared.  No Pain (0) Comment: Data Unavailable   No LMP for male patient.  Allergies reviewed: Yes  Medications reviewed: Yes    Medications: Medication refills not needed today.  Pharmacy name entered into [employer]: [facility] - [facility] [id number] [facility]    Clinical concerns: Spindle cell sarcoma - Labs provider and infusion. He is concerned with sores on his arms, hands, legs and ankles.       Laura Ibarra, ISABEL            "

## 2022-04-26 NOTE — LETTER
4/26/2022         RE: Ifrah Huitron  [address] [place] [address]        Dear Colleague,    Thank you for referring your patient, Ifrah Huitron, to the [facility]. Please see a copy of my visit note below.    [facility] Hematology and Oncology Outpatient Progress Note    Patient: Ifrah Huitron  MRN: [id number]  Date of Service: 04/26/2022        Reason for Visit    1. Refractory thrush, dehydration  2. Spindle cell sarcoma, on chemo    Primary Oncologist: Dr. Wolff ([facility])/Maynor CHOW    Assessment/Plan  1. Metastatic spindle cell sarcoma  He is nearing the end of his last cycle of Doxil. He had a lot of recurrent side effects (detailed below) and is feeling better this week.     He follows up with his primary team for virtual reassessment next week to decide on his next cycle of Doxil.   Tentative plan is to repeat imaging to assess response after this next cycle, in late May.     2. Refractory thrush/mucositis  Recurs for 10-14 days after each cycle of Doxil, then improves for 5-7 days ahead of the next cycle. Describes as sore white coating and mouth sores on tongue/inner lips + sore throat. Currently, nearly resolved on exam.     Plan:  -Continue current treatments: baking soda/salt water rinses, fluconazole 100 mg daily, Nystatin and Helios  -Culture for HSV sent today to ensure he does not have a viral infection/co-infection requiring alternate treatment  -It appears an ENT referral was planned, but patient has not had yet. He will check with Maynor on status of this next week  -May need to consider higher-dose fluconazole (400 mg) or alternate antifungal tx (such as voriconazole) for refractory thrush with next cycle     3. Dehydration  4. Hypokalemia  5. Hypophosphatemia  Has been getting IVF at [facility] 2 times/week, on average. He feels better with IVF. Creatinine is now improving.  On potassium 20 meq daily. Potassium is 3.2  Was  given phos supplment last week, taking daily. Phos is now WNL    Plan:  -Take additional 20 meq potassium by mouth (40 meq total) x next 3 days, then continue 20 meq po daily  -Hold phosphorous supplement now and recheck next week.  -IVF scheduled Thursday, he will keep this  -Will work on drinking more fluids on his own and eating small meals/snacks through day    6.   Nausea  Minimal this week. Managed with Zofran and compazine as needed.    7.   Diarrhea  Problematic after last cycle Doxil. Treated with Pepto Bismol and Imodium. This is now resolved.    8.   Hand/foot syndrome  Ices hands/feet during infusions. Emollients. Kenalog cream as needed.    9.  Rash  Presumed side effect of Doxil given cyclical recurrence. Has some healing lesions on knees, hips and right tricep area.     10. Exertional dyspnea  This appears to be related to fatigue/deconditioning and dehydration. Improved this week. No fevers, cough, cardiac symptoms. VSS. Lungs clear.    11.  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 Endocrinologist has suggested he increase his maintenance hydrocortisone doses x 3 days after chemo (stress dosing) to see if that helps his tolerance    ______________________________________________________________________________    History of Present Illness/ Interval History    Mr. Ifrah Huitron  is a 71 year old on palliative chemo for sarcoma, managed by Dr. Wolff and Maynor Rios PA.   Had last cycle of Doxil 4/7, almost 3 weeks ago. Last cycle was slightly dose-reduced. I was asked to see him at the [facility] today for toxicity eval while here for fluids.     After each cycle of Doxil, he  struggles with a lot of fatigue, dehydration (requiring IVF and electrolyte replacement) and refractory mucositis/thrush (despite treatment with Nystatin and Healios). Fluconazole has been the most helpful. With last cycle, he was put on prophylactic fluconazole 100 mg  daily, in addition to Nystatin, Healios and baking/salt water rinses. Despite this, he still had pretty severe recurrence right after Doxil that lasted 10 days and is gradually improving. It usually resolves before his next cycle of chemo. Describes as a painful white coating over tongue, with pain in inner lips and throat as well. No fevers.     Dysgeusia. Poor appetite. Weight is up and down. Doing fair with fluids.  Minimal nausea without emesis, well-managed with Zofran and compazine. Better this week.   Had diarrhea the first week after chemo, treated with Imodium and Pepto Bismol.    Paronychia.   Hand/foot syndrome - icing hands/feet with Doxil infusions and using emollients. Occasional blisters on fingers. Kenalog cream as needed.   Linear, tender, blister-like rash on knees/hips and arms after each cycle of chemo currently healing.    Last week noted increased exertional dyspnea at baseline, resolved over last 2-3 days. Dypsnea seems to feel worse when feeling tired. No cough. No chest pain. No LE swelling.     ECOG Performance    1-2      Oncology History/Treatment  Diagnosis/Stage:   3/2021: Metastatic spindle cell sarcoma involving mediastinum, sub-diaphragm, lung  -presented to PCP with left shoulder/chest/back pain  -imaging: subdiaphragmatic mass, liver lesions, mediastinal mass with multiple lung mets  -mediastinal mass biopsy: spindle cell sarcoma. 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 Complicated by nausea and recurrent thrush  -C4: same dose reduction. Complicated by significant neurotoxicity, so only received 5.5  days. Symptoms resolved after stopping ifosfamide.   -CT showed response after 4 cycles    -3/10/2022 - present: single agent Doxil.   -C5 10% dose-reduced  -C6 additional 10% (20% total) dose-reduced      Physical Exam    GENERAL: Alert and oriented to time place and person. Seated comfortably. In no distress.  HEAD: Atraumatic and normocephalic. Partial alopecia.  EYES: LANCE, EOMI. No erythema. No icterus.  ORAL CAVITY: Moist. A few focal areas of white mucositis on mid/posterior tongue. Mild erythema inner lips without lesions.   CHEST: clear to auscultation bilaterally. Resonant to percussion throughout bilaterally. Symmetrical breath movements bilaterally.  CVS:  Regular rate and rhythm.   ABDOMEN: Soft. Not tender. Not distended. No palpable hepatomegaly or splenomegaly. No other mass palpable. Bowel sounds present.  EXTREMITIES: Warm. No peripheral edema.  SKIN: Moderate erythema and dry skin hands. Healing blister right middle digit. Brittle nails. Healing linear erythematous lesions on inner knees. A few scabbed lesions right tricep, healing.   NEURO: No gross deficit noted. Non-antalgic gait.      Lab Results    Recent Results (from the past 168 hour(s))   Phosphorus   Result Value Ref Range    Phosphorus 2.1 (L) 2.5 - 4.5 mg/dL   Magnesium   Result Value Ref Range    Magnesium 2.0 1.6 - 2.3 mg/dL   Comprehensive metabolic panel   Result Value Ref Range    Sodium 139 133 - 144 mmol/L    Potassium 3.6 3.4 - 5.3 mmol/L    Chloride 107 94 - 109 mmol/L    Carbon Dioxide (CO2) 26 20 - 32 mmol/L    Anion Gap 6 3 - 14 mmol/L    Urea Nitrogen 19 7 - 30 mg/dL    Creatinine 1.36 (H) 0.66 - 1.25 mg/dL    Calcium 9.4 8.5 - 10.1 mg/dL    Glucose 151 (H) 70 - 99 mg/dL    Alkaline Phosphatase 118 40 - 150 U/L    AST 16 0 - 45 U/L    ALT 18 0 - 70 U/L    Protein Total 6.7 (L) 6.8 - 8.8 g/dL    Albumin 3.2 (L) 3.4 - 5.0 g/dL    Bilirubin Total 0.3 0.2 - 1.3 mg/dL    GFR Estimate 55 (L) >60 mL/min/1.73m2   CBC with  platelets and differential   Result Value Ref Range    WBC Count 6.2 4.0 - 11.0 10e3/uL    RBC Count 3.24 (L) 4.40 - 5.90 10e6/uL    Hemoglobin 9.9 (L) 13.3 - 17.7 g/dL    Hematocrit 31.7 (L) 40.0 - 53.0 %    MCV 98 78 - 100 fL    MCH 30.6 26.5 - 33.0 pg    MCHC 31.2 (L) 31.5 - 36.5 g/dL    RDW 15.0 10.0 - 15.0 %    Platelet Count 173 150 - 450 10e3/uL    % Neutrophils 68 %    % Lymphocytes 27 %    % Monocytes 4 %    % Eosinophils 2 %    % Basophils 0 %    Absolute Neutrophils 4.2 1.6 - 8.3 10e3/uL    Absolute Lymphocytes 1.7 0.8 - 5.3 10e3/uL    Absolute Monocytes 0.2 0.0 - 1.3 10e3/uL    Absolute Eosinophils 0.1 0.0 - 0.7 10e3/uL    Absolute Basophils 0.0 0.0 - 0.2 10e3/uL   Phosphorus   Result Value Ref Range    Phosphorus 2.0 (L) 2.5 - 4.5 mg/dL   Magnesium   Result Value Ref Range    Magnesium 2.2 1.6 - 2.3 mg/dL   Comprehensive metabolic panel   Result Value Ref Range    Sodium 142 133 - 144 mmol/L    Potassium 3.4 3.4 - 5.3 mmol/L    Chloride 110 (H) 94 - 109 mmol/L    Carbon Dioxide (CO2) 27 20 - 32 mmol/L    Anion Gap 5 3 - 14 mmol/L    Urea Nitrogen 19 7 - 30 mg/dL    Creatinine 1.31 (H) 0.66 - 1.25 mg/dL    Calcium 9.5 8.5 - 10.1 mg/dL    Glucose 88 70 - 99 mg/dL    Alkaline Phosphatase 118 40 - 150 U/L    AST 19 0 - 45 U/L    ALT 19 0 - 70 U/L    Protein Total 6.6 (L) 6.8 - 8.8 g/dL    Albumin 3.2 (L) 3.4 - 5.0 g/dL    Bilirubin Total 0.3 0.2 - 1.3 mg/dL    GFR Estimate 57 (L) >60 mL/min/1.73m2   CBC with platelets and differential   Result Value Ref Range    WBC Count 4.8 4.0 - 11.0 10e3/uL    RBC Count 3.15 (L) 4.40 - 5.90 10e6/uL    Hemoglobin 9.8 (L) 13.3 - 17.7 g/dL    Hematocrit 30.6 (L) 40.0 - 53.0 %    MCV 97 78 - 100 fL    MCH 31.1 26.5 - 33.0 pg    MCHC 32.0 31.5 - 36.5 g/dL    RDW 15.1 (H) 10.0 - 15.0 %    Platelet Count 145 (L) 150 - 450 10e3/uL   Manual Differential   Result Value Ref Range    % Neutrophils 69 %    % Lymphocytes 24 %    % Monocytes 5 %    % Eosinophils 0 %    % Basophils 2 %  "   Absolute Neutrophils 3.3 1.6 - 8.3 10e3/uL    Absolute Lymphocytes 1.2 0.8 - 5.3 10e3/uL    Absolute Monocytes 0.2 0.0 - 1.3 10e3/uL    Absolute Eosinophils 0.0 0.0 - 0.7 10e3/uL    Absolute Basophils 0.1 0.0 - 0.2 10e3/uL    RBC Morphology Confirmed RBC Indices     Platelet Assessment  Automated Count Confirmed. Platelet morphology is normal.     Automated Count Confirmed. Platelet morphology is normal.       Imaging    No results found.    Billing  Total time: 40 min; including review of EMR, reports, diagnostics and ordering/coordination of care    Signed by: Malorie Castrejon NP      Oncology Rooming Note    April 26, 2022 8:56 AM   Ifrah Huitron is a 71 year old male who presents for:    Chief Complaint   Patient presents with     Oncology Clinic Visit     Spindle cell sarcoma - Labs provider and infusion     Initial Vitals: BP (!) 152/82 (BP Location: Left arm, Patient Position: Sitting, Cuff Size: Adult Regular)   Pulse 70   Temp 97.9  F (36.6  C) (Oral)   Resp 18   Ht 1.702 m (5' 7\")   Wt 64 kg (141 lb 3.2 oz)   SpO2 99%   BMI 22.12 kg/m   Estimated body mass index is 22.12 kg/m  as calculated from the following:    Height as of this encounter: 1.702 m (5' 7\").    Weight as of this encounter: 64 kg (141 lb 3.2 oz). Body surface area is 1.74 meters squared.  No Pain (0) Comment: Data Unavailable   No LMP for male patient.  Allergies reviewed: Yes  Medications reviewed: Yes    Medications: Medication refills not needed today.  Pharmacy name entered into [employer]: [facility] - [facility] [id number] [facility]    Clinical concerns: Spindle cell sarcoma - Labs provider and infusion. He is concerned with sores on his arms, hands, legs and ankles.       Laura Ibarra, [facility]                Again, thank you for allowing me to participate in the care of your patient.        Sincerely,        Malorie Castrejon NP    "

## 2022-04-26 NOTE — PATIENT INSTRUCTIONS
HSV culture oral cavity obtained    IVF this Thurs    Keep appt 5/4 with GERALDO Capone ahead of labs/chemo to follow 5/5 ([facility])    Med changes:  -Increase K to 40 meq orally x 3 days, then back to 20 meq daily  -Can hold phosphorous supplement for now  -Continue fluconazole 100 mg daily, Nystatin

## 2022-04-28 ENCOUNTER — INFUSION THERAPY VISIT (OUTPATIENT)
Dept: INFUSION THERAPY | Facility: CLINIC | Age: 72
End: 2022-04-28
Attending: NURSE PRACTITIONER
Payer: MEDICARE

## 2022-04-28 VITALS — RESPIRATION RATE: 16 BRPM | SYSTOLIC BLOOD PRESSURE: 147 MMHG | HEART RATE: 69 BPM | DIASTOLIC BLOOD PRESSURE: 84 MMHG

## 2022-04-28 DIAGNOSIS — C49.9 SARCOMA (H): ICD-10-CM

## 2022-04-28 DIAGNOSIS — T45.1X5A CHEMOTHERAPY-INDUCED NAUSEA: Primary | ICD-10-CM

## 2022-04-28 DIAGNOSIS — R11.0 CHEMOTHERAPY-INDUCED NAUSEA: Primary | ICD-10-CM

## 2022-04-28 PROCEDURE — 96360 HYDRATION IV INFUSION INIT: CPT

## 2022-04-28 PROCEDURE — 258N000003 HC RX IP 258 OP 636: Performed by: PHYSICIAN ASSISTANT

## 2022-04-28 PROCEDURE — 250N000011 HC RX IP 250 OP 636: Performed by: PHYSICIAN ASSISTANT

## 2022-04-28 RX ORDER — HEPARIN SODIUM (PORCINE) LOCK FLUSH IV SOLN 100 UNIT/ML 100 UNIT/ML
5 SOLUTION INTRAVENOUS
Status: COMPLETED | OUTPATIENT
Start: 2022-04-28 | End: 2022-04-28

## 2022-04-28 RX ORDER — HEPARIN SODIUM (PORCINE) LOCK FLUSH IV SOLN 100 UNIT/ML 100 UNIT/ML
5 SOLUTION INTRAVENOUS
Status: CANCELLED
Start: 2022-04-28

## 2022-04-28 RX ADMIN — SODIUM CHLORIDE 1000 ML: 9 INJECTION, SOLUTION INTRAVENOUS at 11:10

## 2022-04-28 RX ADMIN — Medication 5 ML: at 11:58

## 2022-04-28 NOTE — TELEPHONE ENCOUNTER
Forwarding to ordering provider and PCP for review of recent biopsy and recommendations please.     Marlena Lopez RN  [facility]    
Pt states the [facility] got a hold of him and explained results.  Myra Taylor RN    
Reason for call:    Symptom or request:     Patient called looking for results for abdominal mass biopsy.       Best Time:  any    Can we leave a detailed message on this number?  YES     No [facility]  Station     
The subdiaphragmatic mass biopsy report stated an inflammatory mass (granuloma) but no malignancy (cancer) cells were found. This needs to be discussed by the specialty providers with the patient. I advise that he contact them for full explanation of the findings, and to determine future plans.  
Patient/Caregiver provided printed discharge information.

## 2022-04-28 NOTE — PROGRESS NOTES
Infusion Nursing Note:  Ifrah Huitron presents today for IVF.    Patient seen by provider today: No   present during visit today: Not Applicable.    Note: N/A.      Intravenous Access:  Implanted Port.    Treatment Conditions:  Cr 1.25  Results reviewed, labs MET treatment parameters, ok to proceed with treatment.      Post Infusion Assessment:  Patient tolerated infusion without incident.  Blood return noted pre and post infusion.  Site patent and intact, free from redness, edema or discomfort.  No evidence of extravasations.  Access discontinued per protocol.       Discharge Plan:   Discharge instructions reviewed with: Patient.  Patient and/or family verbalized understanding of discharge instructions and all questions answered.  Patient discharged in stable condition accompanied by: self.  Departure Mode: Ambulatory.      Sarahi Astudillo RN

## 2022-05-04 ENCOUNTER — VIRTUAL VISIT (OUTPATIENT)
Dept: ONCOLOGY | Facility: CLINIC | Age: 72
End: 2022-05-04
Attending: PHYSICIAN ASSISTANT
Payer: MEDICARE

## 2022-05-04 DIAGNOSIS — R06.00 DYSPNEA, UNSPECIFIED TYPE: ICD-10-CM

## 2022-05-04 DIAGNOSIS — C49.9 SPINDLE CELL SARCOMA (H): Primary | ICD-10-CM

## 2022-05-04 PROCEDURE — G0463 HOSPITAL OUTPT CLINIC VISIT: HCPCS | Mod: PN,RTG | Performed by: PHYSICIAN ASSISTANT

## 2022-05-04 PROCEDURE — 99214 OFFICE O/P EST MOD 30 MIN: CPT | Mod: 95 | Performed by: PHYSICIAN ASSISTANT

## 2022-05-04 NOTE — LETTER
5/4/2022         RE: Ifrah Huitron  [id number] [address] [facility] [address]        Dear Colleague,    Thank you for referring your patient, Ifrah Huitron, to the [facility]. Please see a copy of my visit note below.    Ifrah is a 71 year old who is being evaluated via a billable video visit.    Ifrah Huitron stated he is in the state of [place] for the visit today.    How would you like to obtain your AVS? MyChart  If the video visit is dropped, the invitation should be resent by: Text to cell phone: [phone]  Will anyone else be joining your video visit? No      Video Start Time: 3:40pm    Video-Visit Details    Type of service:  Video Visit    Video End Time:3:55pm    Originating Location (pt. Location): Home    Distant Location (provider location):  [facility]     Platform used for Video Visit: Thomas Morris, Virtual Visit Facilitator      Oncology/Hematology Visit Note  May 4, 2022    Reason for Visit: Follow up of spindle cell sarcoma     History of Present Illness: Ifrah Huitron is a 71 year old male with PMH rosacea, pituitary macroadenoma s/p resection, GERD, kidney stones, DJD with metastatic spindle cell sarcoma. He presented to his PCP March 2021 with chest pain/left shoulder and back pain that led to imaging which revealed multiple lung mets. There were difficulties in getting diagnostic biopsy, eventually biopsy of mediastinal mass with spindle cell sarcoma. There was high PD-L1 expression (90%). Baseline imaging 6/21/21 with progression of lung mets and left-sided subdiaphragmatic mass, stable liver lesions. 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      He started Keytruda 6/21/21. CT 8/2/21 with positive response to treatment. CT 10/18/21 with mixed response- LUQ mass larger, anterior mediastinal and left anterior pleural mass smaller. Keytruda  stopped.     Started on Doxil + Ifosfamide 10/26/21. Poorly tolerated with mucositis, nausea, poor oral intake. CT with stable to positive response.      Received C2 12/1/21 (delayed for tolerance issues) with 10% dose reduction.     Received C3 1/4/22 with same 10% dose reduction. He had issues with nausea inpatient along with recurrent thrush.      Received C4 2/2/22 with same 10% dose reduction. Had more significant neurotoxicity so only received 5.5 days. Symptoms resolved after stopping Ifosfamide.      CT 3/8/22 with positive response to treatment.      He is now on Doxil alone but has been dealing with worsening mout hand skin toxicity.     Interval History:  Ifrah had a rough time this last cycle. He had significant thrush and mouth sores that made it hard to eat or drink. He has been doing Nystatin, fluconazole, healios, and salt/soda with little relief. He required multiple IVF appointments this last cycle and had mild HUSSEIN. His mouth has been better the last 3 days but is still not back to normal.    He also notes more skin lesions on his arms and hips. It starts as a blister and then opens. He is using creams which help but sometimes the lesions were quite sore.     He has been more short of breath the last week or so. He notes this with exertion such as going up stairs. Also feeling more hoarse. No fevers, change in mild cough, or return of cancer related pain.     GI side effects manageable, using PPI for gerd and PRN antiemetics.     Current Outpatient Medications   Medication Sig Dispense Refill     cholecalciferol 25 MCG (1000 UT) TABS Take 1,000 Units by mouth daily        escitalopram (LEXAPRO) 10 MG tablet Take 1 tablet (10 mg) by mouth daily 30 tablet 3     fluconazole (DIFLUCAN) 100 MG tablet Take 1 tablet (100 mg) by mouth daily 30 tablet 3     guaiFENesin-codeine (GUAIFENESIN AC) 100-10 MG/5ML syrup Take 10 mLs by mouth every 4 hours as needed for cough (Patient taking differently: Take 10 mLs  "by mouth every 4 hours as needed for cough prn) 118 mL 0     hydrocortisone (CORTEF) 10 MG tablet Take 20 mg in the morning and 10 mg in the afternoon 270 tablet 3     Insulin Syringe-Needle U-100 27.5G X 5/8\" 2 ML MISC 1 each daily as needed (with solucortef for adrenal crisis) 10 each 1     levothyroxine (SYNTHROID/LEVOTHROID) 75 MCG tablet Take 75 mcg by mouth       levothyroxine (SYNTHROID/LEVOTHROID) 75 MCG tablet Take 1 tablet (75 mcg) by mouth every morning 90 tablet 3     Menthol-Methyl Salicylate (DALIA MALIK GREASELESS) cream Apply topically 2 times daily       metroNIDAZOLE (METROGEL) 1 % external gel Apply topically daily .Try stronger dose due to increased symptoms after chemo. 30 g 0     OLANZapine (ZYPREXA) 5 MG tablet Take 1 tablet (5 mg) by mouth At Bedtime Continue until your nausea resolves (Patient taking differently: Take 5 mg by mouth At Bedtime Continue until your nausea resolves; prn) 30 tablet 1     omeprazole (PRILOSEC) 20 MG DR capsule Take 2 capsules (40 mg) by mouth daily 60 capsule 1     ondansetron (ZOFRAN) 4 MG tablet Take 1-2 tablets (4-8 mg) by mouth every 8 hours as needed for nausea 60 tablet 3     potassium chloride ER (KLOR-CON M) 20 MEQ CR tablet Take 1 tablet (20 mEq) by mouth daily 30 tablet 1     prochlorperazine (COMPAZINE) 5 MG tablet Take 1 tablet (5 mg) by mouth every 6 hours as needed for nausea or vomiting . Caution: causes sedation. (Patient taking differently: Take 5 mg by mouth every 6 hours as needed for nausea or vomiting . Caution: causes sedation. PRN) 30 tablet 1     SUMAtriptan (IMITREX) 50 MG tablet Take 1 tablet (50 mg) by mouth at onset of headache for migraine May repeat in 2 hours. Max 4 tablets/24 hours. (Patient taking differently: Take 50 mg by mouth at onset of headache for migraine May repeat in 2 hours. Max 4 tablets/24 hours. PRN) 10 tablet 3     triamcinolone (KENALOG) 0.1 % external cream Apply topically 2 times daily to bothersome skin areas. 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     Thyroid disease     removed pituitary gland     Past Surgical History:   Procedure Laterality Date     COLONOSCOPY N/A 12/17/2020    Procedure: COLONOSCOPY;  Surgeon: Ken Camacho MD;  Location: [facility]     ENT SURGERY       HERNIA REPAIR       INSERT PORT VASCULAR ACCESS Right 1/28/2022    Procedure: INSERTION, VASCULAR ACCESS PORT;  Surgeon: Daniel Kinney MD;  [place] [facility]     IR CHEST PORT PLACEMENT > 5 YRS OF AGE  1/28/2022     LARYNGOSCOPY, EXCISE VOCAL CORD LESION MICROSCOPIC, COMBINED Left 07/01/2021    Procedure: MICROLARYNGOSCOPY, LEFT TRUE VOCAL CORD INJECTION WITH PROLARYN;  Surgeon: Kyree Bearden MD;  Location: [facility]     PHACOEMULSIFICATION WITH STANDARD INTRAOCULAR LENS IMPLANT Right 03/10/2021    Procedure: Cataract removal with implant.;  Surgeon: Jamir Mac MD;  Location: [facility]     PHACOEMULSIFICATION WITH STANDARD INTRAOCULAR LENS IMPLANT Left 04/05/2021    Procedure: Cataract removal with implant.;  Surgeon: Jamir Mac MD;  Location: [facility]     PICC DOUBLE LUMEN PLACEMENT Right 12/01/2021    5FR DL PICC, basilic vein. L-38cm, 1cm out.     PICC DOUBLE LUMEN PLACEMENT Right 01/04/2022    Right cephalic, 41 cm, 1 external length     PITUITARY EXCISION       tooth pulled 4/7  Right      Allergies   Allergen Reactions     Penicillins Hives and Swelling            Social History   Social History     Tobacco Use     Smoking status: Never Smoker     Smokeless tobacco: Never Used   Substance Use Topics     Alcohol use: Yes     Comment: rare     Drug use: Never      Past medical history and social history were reviewed.    Physical Examination:  There were no vitals taken for this visit.  Wt Readings from Last 10 Encounters:   04/26/22 64 kg (141 lb 3.2 oz)   04/07/22 64.7 kg (142 lb 9.6 oz)   03/25/22 66.2  kg (146 lb)   03/10/22 66.2 kg (146 lb)   02/08/22 63 kg (138 lb 14.4 oz)   01/28/22 65.8 kg (145 lb)   01/11/22 65.1 kg (143 lb 8 oz)   12/09/21 66.7 kg (147 lb)   12/09/21 67 kg (147 lb 11.2 oz)   12/08/21 64.8 kg (142 lb 13.7 oz)     Video physical exam  General: Patient appears well in no acute distress.   Skin: No visualized rash or lesions on visualized skin  Eyes: EOMI, no erythema, sclera icterus or discharge noted  Resp: Appears to be breathing comfortably without accessory muscle usage, speaking in full sentences, no cough  MSK: Appears to have normal range of motion based on visualized movements  Neurologic: No apparent tremors, facial movements symmetric  Psych: affect normal, alert and oriented    The rest of a comprehensive physical examination is deferred due to PHE (public health emergency) video restrictions    Laboratory Data:   Latest Reference Range & Units 04/26/22 08:25   Sodium 133 - 144 mmol/L 140   Potassium 3.4 - 5.3 mmol/L 3.2 (L)   Chloride 94 - 109 mmol/L 106   Carbon Dioxide 20 - 32 mmol/L 28   Urea Nitrogen 7 - 30 mg/dL 21   Creatinine 0.66 - 1.25 mg/dL 1.25   GFR Estimate >60 mL/min/1.73m2 61 [1]   Calcium 8.5 - 10.1 mg/dL 9.3   Anion Gap 3 - 14 mmol/L 6   Magnesium 1.6 - 2.3 mg/dL 2.0   Phosphorus 2.5 - 4.5 mg/dL 3.0   Albumin 3.4 - 5.0 g/dL 3.3 (L)   Protein Total 6.8 - 8.8 g/dL 6.9   Bilirubin Total 0.2 - 1.3 mg/dL 0.3   Alkaline Phosphatase 40 - 150 U/L 129   ALT 0 - 70 U/L 21   AST 0 - 45 U/L 20   T4 Free 0.76 - 1.46 ng/dL 1.43   Glucose 70 - 99 mg/dL 133 (H)   WBC 4.0 - 11.0 10e3/uL 4.5   Hemoglobin 13.3 - 17.7 g/dL 10.0 (L)   Hematocrit 40.0 - 53.0 % 30.7 (L)   Platelet Count 150 - 450 10e3/uL 161   RBC Count 4.40 - 5.90 10e6/uL 3.23 (L)   MCV 78 - 100 fL 95   MCH 26.5 - 33.0 pg 31.0   MCHC 31.5 - 36.5 g/dL 32.6   RDW 10.0 - 15.0 % 14.9   % Neutrophils % 60   % Lymphocytes % 29   % Monocytes % 9   % Eosinophils % 1   % Basophils % 1   Absolute Basophils 0.0 - 0.2 10e3/uL 0.0    Absolute Eosinophils 0.0 - 0.7 10e3/uL 0.1   Absolute Immature Granulocytes <=0.4 10e3/uL 0.0   Absolute Lymphocytes 0.8 - 5.3 10e3/uL 1.3   Absolute Monocytes 0.0 - 1.3 10e3/uL 0.4   % Immature Granulocytes % 0   Absolute Neutrophils 1.6 - 8.3 10e3/uL 2.7   Absolute NRBCs 10e3/uL 0.0   NRBCs per 100 WBC <1 /100 0   (L): Data is abnormally low  (H): Data is abnormally high  [1] Effective December 21, 2021 eGFRcr in adults is calculated using the 2021 CKD-EPI creatinine equation which includes age and gender (Chiki et al., NEJ, DOI: 10.1056/FNGWeg7544952)      Assessment and Plan:  1. 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 High PD-L1. Was on Keytruda but had progression, now on Doxil + Ifos started 10/26/21. Port placed 1/28/22.     He completed 4 cycles of Doxil + Ifos, has had multiple tolerance issues including nausea/vomiting, dehydration, neurotoxicity, mucositis, skin toxicity, pancytopenia, hypokalemia, hypophosphatemia. CT with positive response.      Now on Doxil alone- continues to have tolerance issues with signficant mucositis/thrush, skin sores, weakness, dehydration, HUSSEIN. Now with recurrent dyspnea and hoarseness.    Defer Doxil tomorrow by one week. Will still do labs and IVF. Will move up restaging CT and get echo. I will see him with results.     2. GI  Dyspepsia: Continue Omeprazole 40mg daily. Continue Tums PRN     CINV:  Zofran PRN.      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 Cal Saba endocrine.     Hypothyroidism continue Synthroid 75mcg daily.       4. Derm  Rosacea: Long standing issue, continue Metrogel PRN     Hand/foot: 2/2 Doxil, icing hands and feet. Continue emollients at home. Kenalog cream for bothersome/blistering and red areas. Skin getting worse, holding treatment as above.     5. MSK  Left shoulder/back/chest wall pain 2/2 tumor, following with palliative. No pain currently, off all pain  medications.       6. ENT  Hoarseness: Thought 2/2 mediastinal mass vs irritation from prior biopsy. Following with ENT, s/p vocal cord injection 7/1/21. Following with voice clinic. Getting worse.     Mucositis/Thrush: Continues to be significant. Continue fluconazole 100mg daily, nystatin, healios, salt/soda and hold treatment as above.      7. Pulm/Cards  Working with speech on laryngreal dysfunction.    SOB getting worse as is hoarseness. Getting repeat CT as above and will also get echo to assess for cardiac toxicity of Doxil.     Continue Guaifenesin-Coedine PRN for cough, much improved.      Off statin due to LFT elevation, improved.      8. FEN  Hypokalemia: Recurrent issue, continue 20meq daily. He feels better on supplement.      Hypophosphatemia: Off supplement, occasionally a bit low but overall better. Monitor.     Dehydration with HUSSEIN: Will get labs and IVF tomorrow. Delaying treatment as above. If we do continue Doxil will need 2x weekly IVF.      9. Psych/Neuro  Anxiety/depression: Improved, on Lexapro and feels like it is helping. Continue.      Continue PRN Imitrex for migraines.      10. Heme  Pancytopenia 2/2 chemotherapy. Much improved on Doxil alone.    PLAN  -Hold Doxil tomorrow  -Do labs (CBC, CMP, Mag, Phos) tomorrow and IVF  -CT CAP  and Echo ASAP  -Schedule Maynor in one week to review results  -Schedule delayed Doxil after Maynor appointment     20 minutes spent on the date of the encounter doing chart review, patient visit and documentation     Maynor Rios PA-C  Department of Hematology and Oncology  [facility]         Again, thank you for allowing me to participate in the care of your patient.        Sincerely,        GERALDO Mullins

## 2022-05-04 NOTE — NURSING NOTE
Ifrah Huitron verified meds and allergies are correct via patients echeck in. No changes at this time.    Evy Morris, Virtual Facilitator

## 2022-05-04 NOTE — PROGRESS NOTES
Ifrah is a 71 year old who is being evaluated via a billable video visit.    Ifrah Huitron stated he is in the state of [place] for the visit today.    How would you like to obtain your AVS? MyChart  If the video visit is dropped, the invitation should be resent by: Text to cell phone: [phone]  Will anyone else be joining your video visit? No      Video Start Time: 3:40pm    Video-Visit Details    Type of service:  Video Visit    Video End Time:3:55pm    Originating Location (pt. Location): Home    Distant Location (provider location):  [facility]     Platform used for Video Visit: Thomas Morris, Virtual Visit Facilitator      Oncology/Hematology Visit Note  May 4, 2022    Reason for Visit: Follow up of spindle cell sarcoma     History of Present Illness: Ifrah Huitron is a 71 year old male with PMH rosacea, pituitary macroadenoma s/p resection, GERD, kidney stones, DJD with metastatic spindle cell sarcoma. He presented to his PCP March 2021 with chest pain/left shoulder and back pain that led to imaging which revealed multiple lung mets. There were difficulties in getting diagnostic biopsy, eventually biopsy of mediastinal mass with spindle cell sarcoma. There was high PD-L1 expression (90%). Baseline imaging 6/21/21 with progression of lung mets and left-sided subdiaphragmatic mass, stable liver lesions. 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      He started Keytruda 6/21/21. CT 8/2/21 with positive response to treatment. CT 10/18/21 with mixed response- LUQ mass larger, anterior mediastinal and left anterior pleural mass smaller. Keytruda stopped.     Started on Doxil + Ifosfamide 10/26/21. Poorly tolerated with mucositis, nausea, poor oral intake. CT with stable to positive response.      Received C2 12/1/21 (delayed for tolerance issues) with 10% dose reduction.     Received C3  "1/4/22 with same 10% dose reduction. He had issues with nausea inpatient along with recurrent thrush.      Received C4 2/2/22 with same 10% dose reduction. Had more significant neurotoxicity so only received 5.5 days. Symptoms resolved after stopping Ifosfamide.      CT 3/8/22 with positive response to treatment.      He is now on Doxil alone but has been dealing with worsening mout hand skin toxicity.     Interval History:  Ifrah had a rough time this last cycle. He had significant thrush and mouth sores that made it hard to eat or drink. He has been doing Nystatin, fluconazole, healios, and salt/soda with little relief. He required multiple IVF appointments this last cycle and had mild HUSSEIN. His mouth has been better the last 3 days but is still not back to normal.    He also notes more skin lesions on his arms and hips. It starts as a blister and then opens. He is using creams which help but sometimes the lesions were quite sore.     He has been more short of breath the last week or so. He notes this with exertion such as going up stairs. Also feeling more hoarse. No fevers, change in mild cough, or return of cancer related pain.     GI side effects manageable, using PPI for gerd and PRN antiemetics.     Current Outpatient Medications   Medication Sig Dispense Refill     cholecalciferol 25 MCG (1000 UT) TABS Take 1,000 Units by mouth daily        escitalopram (LEXAPRO) 10 MG tablet Take 1 tablet (10 mg) by mouth daily 30 tablet 3     fluconazole (DIFLUCAN) 100 MG tablet Take 1 tablet (100 mg) by mouth daily 30 tablet 3     guaiFENesin-codeine (GUAIFENESIN AC) 100-10 MG/5ML syrup Take 10 mLs by mouth every 4 hours as needed for cough (Patient taking differently: Take 10 mLs by mouth every 4 hours as needed for cough prn) 118 mL 0     hydrocortisone (CORTEF) 10 MG tablet Take 20 mg in the morning and 10 mg in the afternoon 270 tablet 3     Insulin Syringe-Needle U-100 27.5G X 5/8\" 2 ML MISC 1 each daily as needed " (with solucortef for adrenal crisis) 10 each 1     levothyroxine (SYNTHROID/LEVOTHROID) 75 MCG tablet Take 75 mcg by mouth       levothyroxine (SYNTHROID/LEVOTHROID) 75 MCG tablet Take 1 tablet (75 mcg) by mouth every morning 90 tablet 3     Menthol-Methyl Salicylate (DALIA MALIK GREASELESS) cream Apply topically 2 times daily       metroNIDAZOLE (METROGEL) 1 % external gel Apply topically daily .Try stronger dose due to increased symptoms after chemo. 30 g 0     OLANZapine (ZYPREXA) 5 MG tablet Take 1 tablet (5 mg) by mouth At Bedtime Continue until your nausea resolves (Patient taking differently: Take 5 mg by mouth At Bedtime Continue until your nausea resolves; prn) 30 tablet 1     omeprazole (PRILOSEC) 20 MG DR capsule Take 2 capsules (40 mg) by mouth daily 60 capsule 1     ondansetron (ZOFRAN) 4 MG tablet Take 1-2 tablets (4-8 mg) by mouth every 8 hours as needed for nausea 60 tablet 3     potassium chloride ER (KLOR-CON M) 20 MEQ CR tablet Take 1 tablet (20 mEq) by mouth daily 30 tablet 1     prochlorperazine (COMPAZINE) 5 MG tablet Take 1 tablet (5 mg) by mouth every 6 hours as needed for nausea or vomiting . Caution: causes sedation. (Patient taking differently: Take 5 mg by mouth every 6 hours as needed for nausea or vomiting . Caution: causes sedation. PRN) 30 tablet 1     SUMAtriptan (IMITREX) 50 MG tablet Take 1 tablet (50 mg) by mouth at onset of headache for migraine May repeat in 2 hours. Max 4 tablets/24 hours. (Patient taking differently: Take 50 mg by mouth at onset of headache for migraine May repeat in 2 hours. Max 4 tablets/24 hours. PRN) 10 tablet 3     triamcinolone (KENALOG) 0.1 % external cream Apply topically 2 times daily to bothersome skin areas. 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 OR     IR CHEST PORT PLACEMENT > 5 YRS OF AGE  1/28/2022     LARYNGOSCOPY, EXCISE VOCAL CORD LESION MICROSCOPIC, COMBINED Left 07/01/2021    Procedure: MICROLARYNGOSCOPY, LEFT TRUE VOCAL CORD INJECTION WITH PROLARYN;  Surgeon: Kyree Bearden MD;  Location: [facility]     PHACOEMULSIFICATION WITH STANDARD INTRAOCULAR LENS IMPLANT Right 03/10/2021    Procedure: Cataract removal with implant.;  Surgeon: Jamir Mac MD;  Location: [facility]     PHACOEMULSIFICATION WITH STANDARD INTRAOCULAR LENS IMPLANT Left 04/05/2021    Procedure: Cataract removal with implant.;  Surgeon: Jamir Mac MD;  Location: [facility]     PICC DOUBLE LUMEN PLACEMENT Right 12/01/2021    5FR DL PICC, basilic vein. L-38cm, 1cm out.     PICC DOUBLE LUMEN PLACEMENT Right 01/04/2022    Right cephalic, 41 cm, 1 external length     PITUITARY EXCISION       tooth pulled 4/7  Right      Allergies   Allergen Reactions     Penicillins Hives and Swelling            Social History   Social History     Tobacco Use     Smoking status: Never Smoker     Smokeless tobacco: Never Used   Substance Use Topics     Alcohol use: Yes     Comment: rare     Drug use: Never      Past medical history and social history were reviewed.    Physical Examination:  There were no vitals taken for this visit.  Wt Readings from Last 10 Encounters:   04/26/22 64 kg (141 lb 3.2 oz)   04/07/22 64.7 kg (142 lb 9.6 oz)   03/25/22 66.2 kg (146 lb)   03/10/22 66.2 kg (146 lb)   02/08/22 63 kg (138 lb 14.4 oz)   01/28/22 65.8 kg (145 lb)   01/11/22 65.1 kg (143 lb 8 oz)   12/09/21 66.7 kg (147 lb)   12/09/21 67 kg (147 lb 11.2 oz)   12/08/21 64.8 kg (142 lb 13.7 oz)      Video physical exam  General: Patient appears well in no acute distress.   Skin: No visualized rash or lesions on visualized skin  Eyes: EOMI, no erythema, sclera icterus or discharge noted  Resp: Appears to be breathing comfortably without accessory muscle usage, speaking in full sentences, no cough  MSK: Appears to have normal range of motion based on visualized movements  Neurologic: No apparent tremors, facial movements symmetric  Psych: affect normal, alert and oriented    The rest of a comprehensive physical examination is deferred due to PHE (public health emergency) video restrictions    Laboratory Data:   Latest Reference Range & Units 04/26/22 08:25   Sodium 133 - 144 mmol/L 140   Potassium 3.4 - 5.3 mmol/L 3.2 (L)   Chloride 94 - 109 mmol/L 106   Carbon Dioxide 20 - 32 mmol/L 28   Urea Nitrogen 7 - 30 mg/dL 21   Creatinine 0.66 - 1.25 mg/dL 1.25   GFR Estimate >60 mL/min/1.73m2 61 [1]   Calcium 8.5 - 10.1 mg/dL 9.3   Anion Gap 3 - 14 mmol/L 6   Magnesium 1.6 - 2.3 mg/dL 2.0   Phosphorus 2.5 - 4.5 mg/dL 3.0   Albumin 3.4 - 5.0 g/dL 3.3 (L)   Protein Total 6.8 - 8.8 g/dL 6.9   Bilirubin Total 0.2 - 1.3 mg/dL 0.3   Alkaline Phosphatase 40 - 150 U/L 129   ALT 0 - 70 U/L 21   AST 0 - 45 U/L 20   T4 Free 0.76 - 1.46 ng/dL 1.43   Glucose 70 - 99 mg/dL 133 (H)   WBC 4.0 - 11.0 10e3/uL 4.5   Hemoglobin 13.3 - 17.7 g/dL 10.0 (L)   Hematocrit 40.0 - 53.0 % 30.7 (L)   Platelet Count 150 - 450 10e3/uL 161   RBC Count 4.40 - 5.90 10e6/uL 3.23 (L)   MCV 78 - 100 fL 95   MCH 26.5 - 33.0 pg 31.0   MCHC 31.5 - 36.5 g/dL 32.6   RDW 10.0 - 15.0 % 14.9   % Neutrophils % 60   % Lymphocytes % 29   % Monocytes % 9   % Eosinophils % 1   % Basophils % 1   Absolute Basophils 0.0 - 0.2 10e3/uL 0.0   Absolute Eosinophils 0.0 - 0.7 10e3/uL 0.1   Absolute Immature Granulocytes <=0.4 10e3/uL 0.0   Absolute Lymphocytes 0.8 - 5.3 10e3/uL 1.3   Absolute Monocytes 0.0 - 1.3 10e3/uL 0.4   % Immature Granulocytes % 0   Absolute  Neutrophils 1.6 - 8.3 10e3/uL 2.7   Absolute NRBCs 10e3/uL 0.0   NRBCs per 100 WBC <1 /100 0   (L): Data is abnormally low  (H): Data is abnormally high  [1] Effective December 21, 2021 eGFRcr in adults is calculated using the 2021 CKD-EPI creatinine equation which includes age and gender (Chiki mackay al., [facility], DOI: 10.1056/RQVJoc8786949)      Assessment and Plan:  1. 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 High PD-L1. Was on Keytruda but had progression, now on Doxil + Ifos started 10/26/21. Port placed 1/28/22.     He completed 4 cycles of Doxil + Ifos, has had multiple tolerance issues including nausea/vomiting, dehydration, neurotoxicity, mucositis, skin toxicity, pancytopenia, hypokalemia, hypophosphatemia. CT with positive response.      Now on Doxil alone- continues to have tolerance issues with signficant mucositis/thrush, skin sores, weakness, dehydration, HUSSEIN. Now with recurrent dyspnea and hoarseness.    Defer Doxil tomorrow by one week. Will still do labs and IVF. Will move up restaging CT and get echo. I will see him with results.     2. GI  Dyspepsia: Continue Omeprazole 40mg daily. Continue Tums PRN     CINV:  Zofran PRN.      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 Cal Saba endocrine.     Hypothyroidism continue Synthroid 75mcg daily.       4. Derm  Rosacea: Long standing issue, continue Metrogel PRN     Hand/foot: 2/2 Doxil, icing hands and feet. Continue emollients at home. Kenalog cream for bothersome/blistering and red areas. Skin getting worse, holding treatment as above.     5. MSK  Left shoulder/back/chest wall pain 2/2 tumor, following with palliative. No pain currently, off all pain medications.       6. ENT  Hoarseness: Thought 2/2 mediastinal mass vs irritation from prior biopsy. Following with ENT, s/p vocal cord injection 7/1/21. Following with voice clinic. Getting worse.     Mucositis/Thrush: Continues to be  significant. Continue fluconazole 100mg daily, nystatin, healios, salt/soda and hold treatment as above.      7. Pulm/Cards  Working with speech on laryngreal dysfunction.    SOB getting worse as is hoarseness. Getting repeat CT as above and will also get echo to assess for cardiac toxicity of Doxil.     Continue Guaifenesin-Coedine PRN for cough, much improved.      Off statin due to LFT elevation, improved.      8. FEN  Hypokalemia: Recurrent issue, continue 20meq daily. He feels better on supplement.      Hypophosphatemia: Off supplement, occasionally a bit low but overall better. Monitor.     Dehydration with HUSSEIN: Will get labs and IVF tomorrow. Delaying treatment as above. If we do continue Doxil will need 2x weekly IVF.      9. Psych/Neuro  Anxiety/depression: Improved, on Lexapro and feels like it is helping. Continue.      Continue PRN Imitrex for migraines.      10. Heme  Pancytopenia 2/2 chemotherapy. Much improved on Doxil alone.    PLAN  -Hold Doxil tomorrow  -Do labs (CBC, CMP, Mag, Phos) tomorrow and IVF  -CT CAP  and Echo ASAP  -Schedule Maynor in one week to review results  -Schedule delayed Doxil after Maynor appointment     20 minutes spent on the date of the encounter doing chart review, patient visit and documentation     Maynor Rios PA-C  Department of Hematology and Oncology  [facility]

## 2022-05-04 NOTE — LETTER
5/4/2022         RE: Ifrah Huitron  [id number] [address] [facility] [address]        Dear Colleague,    Thank you for referring your patient, Ifrah Huitron, to the [facility]. Please see a copy of my visit note below.    Ifrah is a 71 year old who is being evaluated via a billable video visit.    Ifrah Huitron stated he is in the state of [place] for the visit today.    How would you like to obtain your AVS? MyChart  If the video visit is dropped, the invitation should be resent by: Text to cell phone: [phone]  Will anyone else be joining your video visit? No      Video Start Time: 3:40pm    Video-Visit Details    Type of service:  Video Visit    Video End Time:3:55pm    Originating Location (pt. Location): Home    Distant Location (provider location):  [facility]     Platform used for Video Visit: Thomas Morris, Virtual Visit Facilitator      Oncology/Hematology Visit Note  May 4, 2022    Reason for Visit: Follow up of spindle cell sarcoma     History of Present Illness: Ifrah Huitron is a 71 year old male with PMH rosacea, pituitary macroadenoma s/p resection, GERD, kidney stones, DJD with metastatic spindle cell sarcoma. He presented to his PCP March 2021 with chest pain/left shoulder and back pain that led to imaging which revealed multiple lung mets. There were difficulties in getting diagnostic biopsy, eventually biopsy of mediastinal mass with spindle cell sarcoma. There was high PD-L1 expression (90%). Baseline imaging 6/21/21 with progression of lung mets and left-sided subdiaphragmatic mass, stable liver lesions. 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      He started Keytruda 6/21/21. CT 8/2/21 with positive response to treatment. CT 10/18/21 with mixed response- LUQ mass larger, anterior mediastinal and left anterior pleural mass smaller. Keytruda  stopped.     Started on Doxil + Ifosfamide 10/26/21. Poorly tolerated with mucositis, nausea, poor oral intake. CT with stable to positive response.      Received C2 12/1/21 (delayed for tolerance issues) with 10% dose reduction.     Received C3 1/4/22 with same 10% dose reduction. He had issues with nausea inpatient along with recurrent thrush.      Received C4 2/2/22 with same 10% dose reduction. Had more significant neurotoxicity so only received 5.5 days. Symptoms resolved after stopping Ifosfamide.      CT 3/8/22 with positive response to treatment.      He is now on Doxil alone but has been dealing with worsening mout hand skin toxicity.     Interval History:  Ifrah had a rough time this last cycle. He had significant thrush and mouth sores that made it hard to eat or drink. He has been doing Nystatin, fluconazole, healios, and salt/soda with little relief. He required multiple IVF appointments this last cycle and had mild HUSSEIN. His mouth has been better the last 3 days but is still not back to normal.    He also notes more skin lesions on his arms and hips. It starts as a blister and then opens. He is using creams which help but sometimes the lesions were quite sore.     He has been more short of breath the last week or so. He notes this with exertion such as going up stairs. Also feeling more hoarse. No fevers, change in mild cough, or return of cancer related pain.     GI side effects manageable, using PPI for gerd and PRN antiemetics.     Current Outpatient Medications   Medication Sig Dispense Refill     cholecalciferol 25 MCG (1000 UT) TABS Take 1,000 Units by mouth daily        escitalopram (LEXAPRO) 10 MG tablet Take 1 tablet (10 mg) by mouth daily 30 tablet 3     fluconazole (DIFLUCAN) 100 MG tablet Take 1 tablet (100 mg) by mouth daily 30 tablet 3     guaiFENesin-codeine (GUAIFENESIN AC) 100-10 MG/5ML syrup Take 10 mLs by mouth every 4 hours as needed for cough (Patient taking differently: Take 10 mLs  "by mouth every 4 hours as needed for cough prn) 118 mL 0     hydrocortisone (CORTEF) 10 MG tablet Take 20 mg in the morning and 10 mg in the afternoon 270 tablet 3     Insulin Syringe-Needle U-100 27.5G X 5/8\" 2 ML MISC 1 each daily as needed (with solucortef for adrenal crisis) 10 each 1     levothyroxine (SYNTHROID/LEVOTHROID) 75 MCG tablet Take 75 mcg by mouth       levothyroxine (SYNTHROID/LEVOTHROID) 75 MCG tablet Take 1 tablet (75 mcg) by mouth every morning 90 tablet 3     Menthol-Methyl Salicylate (DALIA MALIK GREASELESS) cream Apply topically 2 times daily       metroNIDAZOLE (METROGEL) 1 % external gel Apply topically daily .Try stronger dose due to increased symptoms after chemo. 30 g 0     OLANZapine (ZYPREXA) 5 MG tablet Take 1 tablet (5 mg) by mouth At Bedtime Continue until your nausea resolves (Patient taking differently: Take 5 mg by mouth At Bedtime Continue until your nausea resolves; prn) 30 tablet 1     omeprazole (PRILOSEC) 20 MG DR capsule Take 2 capsules (40 mg) by mouth daily 60 capsule 1     ondansetron (ZOFRAN) 4 MG tablet Take 1-2 tablets (4-8 mg) by mouth every 8 hours as needed for nausea 60 tablet 3     potassium chloride ER (KLOR-CON M) 20 MEQ CR tablet Take 1 tablet (20 mEq) by mouth daily 30 tablet 1     prochlorperazine (COMPAZINE) 5 MG tablet Take 1 tablet (5 mg) by mouth every 6 hours as needed for nausea or vomiting . Caution: causes sedation. (Patient taking differently: Take 5 mg by mouth every 6 hours as needed for nausea or vomiting . Caution: causes sedation. PRN) 30 tablet 1     SUMAtriptan (IMITREX) 50 MG tablet Take 1 tablet (50 mg) by mouth at onset of headache for migraine May repeat in 2 hours. Max 4 tablets/24 hours. (Patient taking differently: Take 50 mg by mouth at onset of headache for migraine May repeat in 2 hours. Max 4 tablets/24 hours. PRN) 10 tablet 3     triamcinolone (KENALOG) 0.1 % external cream Apply topically 2 times daily to bothersome skin areas. 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     Thyroid disease     removed pituitary gland     Past Surgical History:   Procedure Laterality Date     COLONOSCOPY N/A 12/17/2020    Procedure: COLONOSCOPY;  Surgeon: Ken Camacho MD;  Location: [facility]     ENT SURGERY       HERNIA REPAIR       INSERT PORT VASCULAR ACCESS Right 1/28/2022    Procedure: INSERTION, VASCULAR ACCESS PORT;  Surgeon: Daniel Kinney MD;  [place] [facility] [place]     IR CHEST PORT PLACEMENT > 5 YRS OF AGE  1/28/2022     LARYNGOSCOPY, EXCISE VOCAL CORD LESION MICROSCOPIC, COMBINED Left 07/01/2021    Procedure: MICROLARYNGOSCOPY, LEFT TRUE VOCAL CORD INJECTION WITH PROLARYN;  Surgeon: Kyree Bearden MD;  Location: [facility]     PHACOEMULSIFICATION WITH STANDARD INTRAOCULAR LENS IMPLANT Right 03/10/2021    Procedure: Cataract removal with implant.;  Surgeon: Jamir Mac MD;  Location: [facility]     PHACOEMULSIFICATION WITH STANDARD INTRAOCULAR LENS IMPLANT Left 04/05/2021    Procedure: Cataract removal with implant.;  Surgeon: Jamir Mac MD;  Location: [facility]     PICC DOUBLE LUMEN PLACEMENT Right 12/01/2021    5FR DL PICC, basilic vein. L-38cm, 1cm out.     PICC DOUBLE LUMEN PLACEMENT Right 01/04/2022    Right cephalic, 41 cm, 1 external length     PITUITARY EXCISION       tooth pulled 4/7  Right      Allergies   Allergen Reactions     Penicillins Hives and Swelling            Social History   Social History     Tobacco Use     Smoking status: Never Smoker     Smokeless tobacco: Never Used   Substance Use Topics     Alcohol use: Yes     Comment: rare     Drug use: Never      Past medical history and social history were reviewed.    Physical Examination:  There were no vitals taken for this visit.  Wt Readings from Last 10 Encounters:   04/26/22 64 kg (141 lb 3.2 oz)   04/07/22 64.7 kg (142 lb 9.6 oz)   03/25/22 66.2  kg (146 lb)   03/10/22 66.2 kg (146 lb)   02/08/22 63 kg (138 lb 14.4 oz)   01/28/22 65.8 kg (145 lb)   01/11/22 65.1 kg (143 lb 8 oz)   12/09/21 66.7 kg (147 lb)   12/09/21 67 kg (147 lb 11.2 oz)   12/08/21 64.8 kg (142 lb 13.7 oz)     Video physical exam  General: Patient appears well in no acute distress.   Skin: No visualized rash or lesions on visualized skin  Eyes: EOMI, no erythema, sclera icterus or discharge noted  Resp: Appears to be breathing comfortably without accessory muscle usage, speaking in full sentences, no cough  MSK: Appears to have normal range of motion based on visualized movements  Neurologic: No apparent tremors, facial movements symmetric  Psych: affect normal, alert and oriented    The rest of a comprehensive physical examination is deferred due to PHE (public health emergency) video restrictions    Laboratory Data:   Latest Reference Range & Units 04/26/22 08:25   Sodium 133 - 144 mmol/L 140   Potassium 3.4 - 5.3 mmol/L 3.2 (L)   Chloride 94 - 109 mmol/L 106   Carbon Dioxide 20 - 32 mmol/L 28   Urea Nitrogen 7 - 30 mg/dL 21   Creatinine 0.66 - 1.25 mg/dL 1.25   GFR Estimate >60 mL/min/1.73m2 61 [1]   Calcium 8.5 - 10.1 mg/dL 9.3   Anion Gap 3 - 14 mmol/L 6   Magnesium 1.6 - 2.3 mg/dL 2.0   Phosphorus 2.5 - 4.5 mg/dL 3.0   Albumin 3.4 - 5.0 g/dL 3.3 (L)   Protein Total 6.8 - 8.8 g/dL 6.9   Bilirubin Total 0.2 - 1.3 mg/dL 0.3   Alkaline Phosphatase 40 - 150 U/L 129   ALT 0 - 70 U/L 21   AST 0 - 45 U/L 20   T4 Free 0.76 - 1.46 ng/dL 1.43   Glucose 70 - 99 mg/dL 133 (H)   WBC 4.0 - 11.0 10e3/uL 4.5   Hemoglobin 13.3 - 17.7 g/dL 10.0 (L)   Hematocrit 40.0 - 53.0 % 30.7 (L)   Platelet Count 150 - 450 10e3/uL 161   RBC Count 4.40 - 5.90 10e6/uL 3.23 (L)   MCV 78 - 100 fL 95   MCH 26.5 - 33.0 pg 31.0   MCHC 31.5 - 36.5 g/dL 32.6   RDW 10.0 - 15.0 % 14.9   % Neutrophils % 60   % Lymphocytes % 29   % Monocytes % 9   % Eosinophils % 1   % Basophils % 1   Absolute Basophils 0.0 - 0.2 10e3/uL 0.0    Absolute Eosinophils 0.0 - 0.7 10e3/uL 0.1   Absolute Immature Granulocytes <=0.4 10e3/uL 0.0   Absolute Lymphocytes 0.8 - 5.3 10e3/uL 1.3   Absolute Monocytes 0.0 - 1.3 10e3/uL 0.4   % Immature Granulocytes % 0   Absolute Neutrophils 1.6 - 8.3 10e3/uL 2.7   Absolute NRBCs 10e3/uL 0.0   NRBCs per 100 WBC <1 /100 0   (L): Data is abnormally low  (H): Data is abnormally high  [1] Effective December 21, 2021 eGFRcr in adults is calculated using the 2021 CKD-EPI creatinine equation which includes age and gender (Chiki et al., NEJ, DOI: 10.1056/NGSYww1911232)      Assessment and Plan:  1. 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 High PD-L1. Was on Keytruda but had progression, now on Doxil + Ifos started 10/26/21. Port placed 1/28/22.     He completed 4 cycles of Doxil + Ifos, has had multiple tolerance issues including nausea/vomiting, dehydration, neurotoxicity, mucositis, skin toxicity, pancytopenia, hypokalemia, hypophosphatemia. CT with positive response.      Now on Doxil alone- continues to have tolerance issues with signficant mucositis/thrush, skin sores, weakness, dehydration, HUSSEIN. Now with recurrent dyspnea and hoarseness.    Defer Doxil tomorrow by one week. Will still do labs and IVF. Will move up restaging CT and get echo. I will see him with results.     2. GI  Dyspepsia: Continue Omeprazole 40mg daily. Continue Tums PRN     CINV:  Zofran PRN.      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 Cal Saba endocrine.     Hypothyroidism continue Synthroid 75mcg daily.       4. Derm  Rosacea: Long standing issue, continue Metrogel PRN     Hand/foot: 2/2 Doxil, icing hands and feet. Continue emollients at home. Kenalog cream for bothersome/blistering and red areas. Skin getting worse, holding treatment as above.     5. MSK  Left shoulder/back/chest wall pain 2/2 tumor, following with palliative. No pain currently, off all pain  medications.       6. ENT  Hoarseness: Thought 2/2 mediastinal mass vs irritation from prior biopsy. Following with ENT, s/p vocal cord injection 7/1/21. Following with voice clinic. Getting worse.     Mucositis/Thrush: Continues to be significant. Continue fluconazole 100mg daily, nystatin, healios, salt/soda and hold treatment as above.      7. Pulm/Cards  Working with speech on laryngreal dysfunction.    SOB getting worse as is hoarseness. Getting repeat CT as above and will also get echo to assess for cardiac toxicity of Doxil.     Continue Guaifenesin-Coedine PRN for cough, much improved.      Off statin due to LFT elevation, improved.      8. FEN  Hypokalemia: Recurrent issue, continue 20meq daily. He feels better on supplement.      Hypophosphatemia: Off supplement, occasionally a bit low but overall better. Monitor.     Dehydration with HUSSEIN: Will get labs and IVF tomorrow. Delaying treatment as above. If we do continue Doxil will need 2x weekly IVF.      9. Psych/Neuro  Anxiety/depression: Improved, on Lexapro and feels like it is helping. Continue.      Continue PRN Imitrex for migraines.      10. Heme  Pancytopenia 2/2 chemotherapy. Much improved on Doxil alone.    PLAN  -Hold Doxil tomorrow  -Do labs (CBC, CMP, Mag, Phos) tomorrow and IVF  -CT CAP  and Echo ASAP  -Schedule Maynor in one week to review results  -Schedule delayed Doxil after Maynor appointment     20 minutes spent on the date of the encounter doing chart review, patient visit and documentation     Maynor Rios PA-C  Department of Hematology and Oncology  [facility]         Again, thank you for allowing me to participate in the care of your patient.        Sincerely,        GERALDO Mullins

## 2022-05-05 ENCOUNTER — DOCUMENTATION ONLY (OUTPATIENT)
Dept: ENDOCRINOLOGY | Facility: CLINIC | Age: 72
End: 2022-05-05

## 2022-05-05 ENCOUNTER — INFUSION THERAPY VISIT (OUTPATIENT)
Dept: INFUSION THERAPY | Facility: CLINIC | Age: 72
End: 2022-05-05
Attending: INTERNAL MEDICINE
Payer: MEDICARE

## 2022-05-05 ENCOUNTER — LAB (OUTPATIENT)
Dept: INFUSION THERAPY | Facility: CLINIC | Age: 72
End: 2022-05-05
Attending: FAMILY MEDICINE
Payer: MEDICARE

## 2022-05-05 VITALS — DIASTOLIC BLOOD PRESSURE: 83 MMHG | HEART RATE: 65 BPM | SYSTOLIC BLOOD PRESSURE: 150 MMHG

## 2022-05-05 DIAGNOSIS — D70.1 CHEMOTHERAPY-INDUCED NEUTROPENIA (H): ICD-10-CM

## 2022-05-05 DIAGNOSIS — T45.1X5A CHEMOTHERAPY-INDUCED NEUTROPENIA (H): ICD-10-CM

## 2022-05-05 DIAGNOSIS — Z51.89 ENCOUNTER FOR OTHER SPECIFIED AFTERCARE: ICD-10-CM

## 2022-05-05 DIAGNOSIS — T45.1X5A CHEMOTHERAPY-INDUCED NAUSEA: ICD-10-CM

## 2022-05-05 DIAGNOSIS — C49.9 SPINDLE CELL SARCOMA (H): ICD-10-CM

## 2022-05-05 DIAGNOSIS — C45.9 MESOTHELIOMA, MALIGNANT (H): Primary | ICD-10-CM

## 2022-05-05 DIAGNOSIS — C49.9 SARCOMA (H): ICD-10-CM

## 2022-05-05 DIAGNOSIS — R11.0 CHEMOTHERAPY-INDUCED NAUSEA: ICD-10-CM

## 2022-05-05 LAB
ALBUMIN SERPL-MCNC: 3.2 G/DL (ref 3.4–5)
ALP SERPL-CCNC: 128 U/L (ref 40–150)
ALT SERPL W P-5'-P-CCNC: 21 U/L (ref 0–70)
ANION GAP SERPL CALCULATED.3IONS-SCNC: 6 MMOL/L (ref 3–14)
AST SERPL W P-5'-P-CCNC: 17 U/L (ref 0–45)
BASOPHILS # BLD AUTO: 0.1 10E3/UL (ref 0–0.2)
BASOPHILS NFR BLD AUTO: 1 %
BILIRUB SERPL-MCNC: 0.2 MG/DL (ref 0.2–1.3)
BUN SERPL-MCNC: 21 MG/DL (ref 7–30)
CALCIUM SERPL-MCNC: 9 MG/DL (ref 8.5–10.1)
CHLORIDE BLD-SCNC: 110 MMOL/L (ref 94–109)
CO2 SERPL-SCNC: 25 MMOL/L (ref 20–32)
CREAT SERPL-MCNC: 1.25 MG/DL (ref 0.66–1.25)
EOSINOPHIL # BLD AUTO: 0 10E3/UL (ref 0–0.7)
EOSINOPHIL NFR BLD AUTO: 1 %
ERYTHROCYTE [DISTWIDTH] IN BLOOD BY AUTOMATED COUNT: 14.6 % (ref 10–15)
GFR SERPL CREATININE-BSD FRML MDRD: 61 ML/MIN/1.73M2
GLUCOSE BLD-MCNC: 89 MG/DL (ref 70–99)
HCT VFR BLD AUTO: 32.4 % (ref 40–53)
HGB BLD-MCNC: 10.3 G/DL (ref 13.3–17.7)
IMM GRANULOCYTES # BLD: 0 10E3/UL
IMM GRANULOCYTES NFR BLD: 1 %
LYMPHOCYTES # BLD AUTO: 2 10E3/UL (ref 0.8–5.3)
LYMPHOCYTES NFR BLD AUTO: 35 %
MAGNESIUM SERPL-MCNC: 2.4 MG/DL (ref 1.6–2.3)
MCH RBC QN AUTO: 30.7 PG (ref 26.5–33)
MCHC RBC AUTO-ENTMCNC: 31.8 G/DL (ref 31.5–36.5)
MCV RBC AUTO: 97 FL (ref 78–100)
MONOCYTES # BLD AUTO: 1.2 10E3/UL (ref 0–1.3)
MONOCYTES NFR BLD AUTO: 21 %
NEUTROPHILS # BLD AUTO: 2.3 10E3/UL (ref 1.6–8.3)
NEUTROPHILS NFR BLD AUTO: 41 %
NRBC # BLD AUTO: 0 10E3/UL
NRBC BLD AUTO-RTO: 0 /100
PHOSPHATE SERPL-MCNC: 2.1 MG/DL (ref 2.5–4.5)
PLAT MORPH BLD: NORMAL
PLATELET # BLD AUTO: 230 10E3/UL (ref 150–450)
POTASSIUM BLD-SCNC: 3.6 MMOL/L (ref 3.4–5.3)
PROT SERPL-MCNC: 7.1 G/DL (ref 6.8–8.8)
RBC # BLD AUTO: 3.35 10E6/UL (ref 4.4–5.9)
RBC MORPH BLD: NORMAL
SODIUM SERPL-SCNC: 141 MMOL/L (ref 133–144)
WBC # BLD AUTO: 5.5 10E3/UL (ref 4–11)

## 2022-05-05 PROCEDURE — 85025 COMPLETE CBC W/AUTO DIFF WBC: CPT | Performed by: PHYSICIAN ASSISTANT

## 2022-05-05 PROCEDURE — 36591 DRAW BLOOD OFF VENOUS DEVICE: CPT

## 2022-05-05 PROCEDURE — 250N000011 HC RX IP 250 OP 636: Performed by: PHYSICIAN ASSISTANT

## 2022-05-05 PROCEDURE — 258N000003 HC RX IP 258 OP 636: Performed by: PHYSICIAN ASSISTANT

## 2022-05-05 PROCEDURE — 84100 ASSAY OF PHOSPHORUS: CPT | Performed by: PHYSICIAN ASSISTANT

## 2022-05-05 PROCEDURE — 80053 COMPREHEN METABOLIC PANEL: CPT | Performed by: PHYSICIAN ASSISTANT

## 2022-05-05 PROCEDURE — 83735 ASSAY OF MAGNESIUM: CPT | Performed by: PHYSICIAN ASSISTANT

## 2022-05-05 PROCEDURE — 96360 HYDRATION IV INFUSION INIT: CPT

## 2022-05-05 PROCEDURE — 82040 ASSAY OF SERUM ALBUMIN: CPT | Performed by: PHYSICIAN ASSISTANT

## 2022-05-05 RX ORDER — HEPARIN SODIUM (PORCINE) LOCK FLUSH IV SOLN 100 UNIT/ML 100 UNIT/ML
SOLUTION INTRAVENOUS
Status: DISCONTINUED
Start: 2022-05-05 | End: 2022-05-05 | Stop reason: HOSPADM

## 2022-05-05 RX ORDER — HEPARIN SODIUM (PORCINE) LOCK FLUSH IV SOLN 100 UNIT/ML 100 UNIT/ML
5 SOLUTION INTRAVENOUS
Status: CANCELLED
Start: 2022-05-05

## 2022-05-05 RX ORDER — HEPARIN SODIUM (PORCINE) LOCK FLUSH IV SOLN 100 UNIT/ML 100 UNIT/ML
5 SOLUTION INTRAVENOUS
Status: COMPLETED | OUTPATIENT
Start: 2022-05-05 | End: 2022-05-05

## 2022-05-05 RX ADMIN — Medication 5 ML: at 11:49

## 2022-05-05 RX ADMIN — SODIUM CHLORIDE 1000 ML: 9 INJECTION, SOLUTION INTRAVENOUS at 10:37

## 2022-05-05 NOTE — PROGRESS NOTES
Infusion Nursing Note:  Ifrah GREEN Lagi presents today for labs, IVF's.    Patient seen by provider today: No; pt had video visit with Maynor yesterday. No Doxil will be given today; they want to give him a little more time to feel better.   present during visit today: Not Applicable.    Note: N/A.      Intravenous Access:  Implanted Port.    Treatment Conditions:  Not Applicable.      Post Infusion Assessment:  Patient tolerated infusion without incident.  Blood return noted pre and post infusion.  Site patent and intact, free from redness, edema or discomfort.  No evidence of extravasations.  Access discontinued per protocol.       Discharge Plan:   Patient discharged in stable condition accompanied by: self.  Departure Mode: Ambulatory.      Maria Simon RN

## 2022-05-06 ENCOUNTER — HOSPITAL ENCOUNTER (OUTPATIENT)
Dept: CT IMAGING | Facility: CLINIC | Age: 72
Discharge: HOME | End: 2022-05-06
Attending: PHYSICIAN ASSISTANT | Admitting: PHYSICIAN ASSISTANT
Payer: MEDICARE

## 2022-05-06 DIAGNOSIS — C49.9 SPINDLE CELL SARCOMA (H): ICD-10-CM

## 2022-05-06 PROCEDURE — 250N000009 HC RX 250: Performed by: RADIOLOGY

## 2022-05-06 PROCEDURE — 250N000011 HC RX IP 250 OP 636: Performed by: RADIOLOGY

## 2022-05-06 PROCEDURE — 74177 CT ABD & PELVIS W/CONTRAST: CPT | Mod: MG

## 2022-05-06 RX ORDER — IOPAMIDOL 755 MG/ML
69 INJECTION, SOLUTION INTRAVASCULAR ONCE
Status: COMPLETED | OUTPATIENT
Start: 2022-05-06 | End: 2022-05-06

## 2022-05-06 RX ORDER — HEPARIN SODIUM (PORCINE) LOCK FLUSH IV SOLN 100 UNIT/ML 100 UNIT/ML
5 SOLUTION INTRAVENOUS ONCE
Status: COMPLETED | OUTPATIENT
Start: 2022-05-06 | End: 2022-05-06

## 2022-05-06 RX ADMIN — IOPAMIDOL 69 ML: 755 INJECTION, SOLUTION INTRAVENOUS at 13:14

## 2022-05-06 RX ADMIN — SODIUM CHLORIDE 57 ML: 9 INJECTION, SOLUTION INTRAVENOUS at 13:14

## 2022-05-06 RX ADMIN — Medication 5 ML: at 13:21

## 2022-05-08 NOTE — BRIEF OP NOTE
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  Complications: None.  Implants:   Implant Name Type Inv. Item Serial No.  Lot No. LRB No. Used Action   CATH PORT POWERPORT CLEARVUE SLIM 6FR [id number] - [id number] Port CATH PORT POWERPORT CLEARVUE SLIM 6FR [id number]   [employer] [id number] Right 1 Implanted     25 cm 6 Hebrew single lumen BD Bard PowerPort ClearVUE Slim placed via right internal jugular vein. Tip in the high to mid right atrium. Heparin locked and ready for immediate use.      
48

## 2022-05-09 ENCOUNTER — LAB (OUTPATIENT)
Dept: INFUSION THERAPY | Facility: CLINIC | Age: 72
End: 2022-05-09
Attending: INTERNAL MEDICINE
Payer: MEDICARE

## 2022-05-09 DIAGNOSIS — R11.0 CHEMOTHERAPY-INDUCED NAUSEA: ICD-10-CM

## 2022-05-09 DIAGNOSIS — T45.1X5A CHEMOTHERAPY-INDUCED NAUSEA: ICD-10-CM

## 2022-05-09 DIAGNOSIS — C49.9 SARCOMA (H): ICD-10-CM

## 2022-05-09 DIAGNOSIS — C49.9 SPINDLE CELL SARCOMA (H): Primary | ICD-10-CM

## 2022-05-09 LAB
ALBUMIN SERPL-MCNC: 3.2 G/DL (ref 3.4–5)
ALP SERPL-CCNC: 125 U/L (ref 40–150)
ALT SERPL W P-5'-P-CCNC: 18 U/L (ref 0–70)
ANION GAP SERPL CALCULATED.3IONS-SCNC: 3 MMOL/L (ref 3–14)
AST SERPL W P-5'-P-CCNC: 29 U/L (ref 0–45)
BASOPHILS # BLD AUTO: 0.1 10E3/UL (ref 0–0.2)
BASOPHILS NFR BLD AUTO: 1 %
BILIRUB SERPL-MCNC: 0.3 MG/DL (ref 0.2–1.3)
BUN SERPL-MCNC: 17 MG/DL (ref 7–30)
CALCIUM SERPL-MCNC: 9.2 MG/DL (ref 8.5–10.1)
CHLORIDE BLD-SCNC: 109 MMOL/L (ref 94–109)
CO2 SERPL-SCNC: 27 MMOL/L (ref 20–32)
CREAT SERPL-MCNC: 1.16 MG/DL (ref 0.66–1.25)
EOSINOPHIL # BLD AUTO: 0.1 10E3/UL (ref 0–0.7)
EOSINOPHIL NFR BLD AUTO: 1 %
ERYTHROCYTE [DISTWIDTH] IN BLOOD BY AUTOMATED COUNT: 14.4 % (ref 10–15)
GFR SERPL CREATININE-BSD FRML MDRD: 67 ML/MIN/1.73M2
GLUCOSE BLD-MCNC: 102 MG/DL (ref 70–99)
HCT VFR BLD AUTO: 32.5 % (ref 40–53)
HGB BLD-MCNC: 10.4 G/DL (ref 13.3–17.7)
IMM GRANULOCYTES # BLD: 0 10E3/UL
IMM GRANULOCYTES NFR BLD: 0 %
LYMPHOCYTES # BLD AUTO: 2.2 10E3/UL (ref 0.8–5.3)
LYMPHOCYTES NFR BLD AUTO: 32 %
MAGNESIUM SERPL-MCNC: 2.2 MG/DL (ref 1.6–2.3)
MCH RBC QN AUTO: 30.7 PG (ref 26.5–33)
MCHC RBC AUTO-ENTMCNC: 32 G/DL (ref 31.5–36.5)
MCV RBC AUTO: 96 FL (ref 78–100)
MONOCYTES # BLD AUTO: 1 10E3/UL (ref 0–1.3)
MONOCYTES NFR BLD AUTO: 14 %
NEUTROPHILS # BLD AUTO: 3.5 10E3/UL (ref 1.6–8.3)
NEUTROPHILS NFR BLD AUTO: 52 %
NRBC # BLD AUTO: 0 10E3/UL
NRBC BLD AUTO-RTO: 0 /100
PHOSPHATE SERPL-MCNC: 2.4 MG/DL (ref 2.5–4.5)
PLATELET # BLD AUTO: 231 10E3/UL (ref 150–450)
POTASSIUM BLD-SCNC: 5.1 MMOL/L (ref 3.4–5.3)
PROT SERPL-MCNC: 7.2 G/DL (ref 6.8–8.8)
RBC # BLD AUTO: 3.39 10E6/UL (ref 4.4–5.9)
SODIUM SERPL-SCNC: 139 MMOL/L (ref 133–144)
WBC # BLD AUTO: 6.7 10E3/UL (ref 4–11)

## 2022-05-09 PROCEDURE — 84100 ASSAY OF PHOSPHORUS: CPT | Performed by: PHYSICIAN ASSISTANT

## 2022-05-09 PROCEDURE — 80053 COMPREHEN METABOLIC PANEL: CPT | Performed by: PHYSICIAN ASSISTANT

## 2022-05-09 PROCEDURE — 96360 HYDRATION IV INFUSION INIT: CPT

## 2022-05-09 PROCEDURE — 82040 ASSAY OF SERUM ALBUMIN: CPT | Performed by: PHYSICIAN ASSISTANT

## 2022-05-09 PROCEDURE — 258N000003 HC RX IP 258 OP 636: Performed by: PHYSICIAN ASSISTANT

## 2022-05-09 PROCEDURE — 250N000011 HC RX IP 250 OP 636: Performed by: PHYSICIAN ASSISTANT

## 2022-05-09 PROCEDURE — 83735 ASSAY OF MAGNESIUM: CPT | Performed by: PHYSICIAN ASSISTANT

## 2022-05-09 PROCEDURE — 85004 AUTOMATED DIFF WBC COUNT: CPT | Performed by: PHYSICIAN ASSISTANT

## 2022-05-09 PROCEDURE — 36591 DRAW BLOOD OFF VENOUS DEVICE: CPT

## 2022-05-09 RX ORDER — HEPARIN SODIUM (PORCINE) LOCK FLUSH IV SOLN 100 UNIT/ML 100 UNIT/ML
5 SOLUTION INTRAVENOUS
Status: COMPLETED | OUTPATIENT
Start: 2022-05-09 | End: 2022-05-09

## 2022-05-09 RX ORDER — HEPARIN SODIUM (PORCINE) LOCK FLUSH IV SOLN 100 UNIT/ML 100 UNIT/ML
5 SOLUTION INTRAVENOUS
Status: CANCELLED
Start: 2022-05-09

## 2022-05-09 RX ADMIN — SODIUM CHLORIDE 1000 ML: 9 INJECTION, SOLUTION INTRAVENOUS at 10:00

## 2022-05-09 RX ADMIN — Medication 5 ML: at 10:42

## 2022-05-09 NOTE — PROGRESS NOTES
Infusion Nursing Note:  Ifrah Hutiron presents today for PAC labd and IVF.    Patient seen by provider today: No   present during visit today: Not Applicable.    Note: Pt reports he'd like to receive fluids today regardless of the creatine results. He reports they help him feel better and he fells like he needs them today.      Intravenous Access:  Implanted Port.    Treatment Conditions:  Lab Results   Component Value Date     05/09/2022    POTASSIUM 5.1 05/09/2022    MAG 2.2 05/09/2022    CR 1.16 05/09/2022    COTY 9.2 05/09/2022    BILITOTAL 0.3 05/09/2022    ALBUMIN 3.2 (L) 05/09/2022    ALT 18 05/09/2022    AST 29 05/09/2022     Results reviewed, labs MET treatment parameters, ok to proceed with treatment.      Post Infusion Assessment:  Patient tolerated infusion without incident.  Blood return noted pre and post infusion.  Site patent and intact, free from redness, edema or discomfort.  No evidence of extravasations.  Access discontinued per protocol.       Discharge Plan:   Discharge instructions reviewed with: Patient.  Patient and/or family verbalized understanding of discharge instructions and all questions answered.  Patient discharged in stable condition accompanied by: self.  Departure Mode: Ambulatory.      Sarahi Astudillo RN

## 2022-05-11 ENCOUNTER — VIRTUAL VISIT (OUTPATIENT)
Dept: ONCOLOGY | Facility: CLINIC | Age: 72
End: 2022-05-11
Attending: PHYSICIAN ASSISTANT
Payer: MEDICARE

## 2022-05-11 DIAGNOSIS — C49.9 SPINDLE CELL SARCOMA (H): Primary | ICD-10-CM

## 2022-05-11 DIAGNOSIS — B37.0 THRUSH: ICD-10-CM

## 2022-05-11 PROCEDURE — 99214 OFFICE O/P EST MOD 30 MIN: CPT | Mod: 95 | Performed by: PHYSICIAN ASSISTANT

## 2022-05-11 RX ORDER — FLUCONAZOLE 200 MG/1
400 TABLET ORAL DAILY
Qty: 14 TABLET | Refills: 0 | Status: SHIPPED | OUTPATIENT
Start: 2022-05-11 | End: 2022-05-18

## 2022-05-11 NOTE — LETTER
5/11/2022         RE: Ifrah Huitron  [id number] [facility] [address]        Dear Colleague,    Thank you for referring your patient, Ifrah Huitron, to the [facility]. Please see a copy of my visit note below.    Oncology/Hematology Visit Note  May 11, 2022    Reason for Visit: Follow up of spindle cell sarcoma     History of Present Illness: Ifrah Huitron is a 71 year old male with PMH rosacea, pituitary macroadenoma s/p resection, GERD, kidney stones, DJD with metastatic spindle cell sarcoma. He presented to his PCP March 2021 with chest pain/left shoulder and back pain that led to imaging which revealed multiple lung mets. There were difficulties in getting diagnostic biopsy, eventually biopsy of mediastinal mass with spindle cell sarcoma. There was high PD-L1 expression (90%). Baseline imaging 6/21/21 with progression of lung mets and left-sided subdiaphragmatic mass, stable liver lesions. 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      He started Keytruda 6/21/21. CT 8/2/21 with positive response to treatment. CT 10/18/21 with mixed response- LUQ mass larger, anterior mediastinal and left anterior pleural mass smaller. Keytruda stopped.     Started on Doxil + Ifosfamide 10/26/21. Poorly tolerated with mucositis, nausea, poor oral intake. CT with stable to positive response.      Received C2 12/1/21 (delayed for tolerance issues) with 10% dose reduction.     Received C3 1/4/22 with same 10% dose reduction. He had issues with nausea inpatient along with recurrent thrush.      Received C4 2/2/22 with same 10% dose reduction. Had more significant neurotoxicity so only received 5.5 days. Symptoms resolved after stopping Ifosfamide.      CT 3/8/22 with positive response to treatment.      He is now on Doxil alone but has been dealing with worsening mouth and skin toxicity.     Interval  "History:  Ifrah was called today for follow-up. He still is feeling OK. Mouth is still sore and skin is sore but all improving. His mouth still has a white coating, despite taking fluconazole daily, healios, nystatin, salt/soda. He still has a hard time eating normally, doing mostly Ramen noodles. The sores on his hips, knees, and arms are now dry and closed but not fully healed. He is still very fatigued, needing naps throughout the day. Continues to have WALKER, though slightly better this week. No chest pain or SOB at rest.    Dizziness is improved. No nausea/vomiting, though takes Zofran intermittently. Had diarrhea after eating a hamburger, now better. No bleeding, swelling.    Current Outpatient Medications   Medication Sig Dispense Refill     cholecalciferol 25 MCG (1000 UT) TABS Take 1,000 Units by mouth daily        escitalopram (LEXAPRO) 10 MG tablet Take 1 tablet (10 mg) by mouth daily 30 tablet 3     fluconazole (DIFLUCAN) 100 MG tablet Take 1 tablet (100 mg) by mouth daily 30 tablet 3     guaiFENesin-codeine (GUAIFENESIN AC) 100-10 MG/5ML syrup Take 10 mLs by mouth every 4 hours as needed for cough (Patient taking differently: Take 10 mLs by mouth every 4 hours as needed for cough prn) 118 mL 0     hydrocortisone (CORTEF) 10 MG tablet Take 20 mg in the morning and 10 mg in the afternoon 270 tablet 3     Insulin Syringe-Needle U-100 27.5G X 5/8\" 2 ML MISC 1 each daily as needed (with solucortef for adrenal crisis) 10 each 1     levothyroxine (SYNTHROID/LEVOTHROID) 75 MCG tablet Take 75 mcg by mouth       levothyroxine (SYNTHROID/LEVOTHROID) 75 MCG tablet Take 1 tablet (75 mcg) by mouth every morning 90 tablet 3     Menthol-Methyl Salicylate (DALIA MALIK GREASELESS) cream Apply topically 2 times daily       metroNIDAZOLE (METROGEL) 1 % external gel Apply topically daily .Try stronger dose due to increased symptoms after chemo. 30 g 0     OLANZapine (ZYPREXA) 5 MG tablet Take 1 tablet (5 mg) by mouth At Bedtime " Continue until your nausea resolves (Patient taking differently: Take 5 mg by mouth At Bedtime Continue until your nausea resolves; prn) 30 tablet 1     omeprazole (PRILOSEC) 20 MG DR capsule Take 2 capsules (40 mg) by mouth daily 60 capsule 1     ondansetron (ZOFRAN) 4 MG tablet Take 1-2 tablets (4-8 mg) by mouth every 8 hours as needed for nausea 60 tablet 3     potassium chloride ER (KLOR-CON M) 20 MEQ CR tablet Take 1 tablet (20 mEq) by mouth daily 30 tablet 1     prochlorperazine (COMPAZINE) 5 MG tablet Take 1 tablet (5 mg) by mouth every 6 hours as needed for nausea or vomiting . Caution: causes sedation. (Patient taking differently: Take 5 mg by mouth every 6 hours as needed for nausea or vomiting . Caution: causes sedation. PRN) 30 tablet 1     SUMAtriptan (IMITREX) 50 MG tablet Take 1 tablet (50 mg) by mouth at onset of headache for migraine May repeat in 2 hours. Max 4 tablets/24 hours. (Patient taking differently: Take 50 mg by mouth at onset of headache for migraine May repeat in 2 hours. Max 4 tablets/24 hours. PRN) 10 tablet 3     triamcinolone (KENALOG) 0.1 % external cream Apply topically 2 times daily to bothersome skin areas. 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     Thyroid disease     removed pituitary gland     Past Surgical History:   Procedure Laterality Date     COLONOSCOPY N/A 12/17/2020    Procedure: COLONOSCOPY;  Surgeon: Ken Camacho MD;  Location: [facility]     ENT SURGERY       HERNIA REPAIR       INSERT PORT VASCULAR ACCESS Right 1/28/2022    Procedure: INSERTION, VASCULAR ACCESS PORT;  Surgeon: Daniel Kinney MD;  Location: [facility] OR     IR CHEST PORT PLACEMENT > 5 YRS OF AGE  1/28/2022     LARYNGOSCOPY, EXCISE VOCAL CORD LESION MICROSCOPIC, COMBINED Left 07/01/2021    Procedure: MICROLARYNGOSCOPY, LEFT TRUE VOCAL CORD INJECTION WITH PROLARYN;  Surgeon:  Kyree Bearden MD;  Location: [facility]     PHACOEMULSIFICATION WITH STANDARD INTRAOCULAR LENS IMPLANT Right 03/10/2021    Procedure: Cataract removal with implant.;  Surgeon: Jamir Mac MD;  Location: [facility]     PHACOEMULSIFICATION WITH STANDARD INTRAOCULAR LENS IMPLANT Left 04/05/2021    Procedure: Cataract removal with implant.;  Surgeon: Jamir Mac MD;  Location: [facility]     PICC DOUBLE LUMEN PLACEMENT Right 12/01/2021    5FR DL PICC, basilic vein. L-38cm, 1cm out.     PICC DOUBLE LUMEN PLACEMENT Right 01/04/2022    Right cephalic, 41 cm, 1 external length     PITUITARY EXCISION       tooth pulled 4/7  Right      Allergies   Allergen Reactions     Penicillins Hives and Swelling            Social History   Social History     Tobacco Use     Smoking status: Never Smoker     Smokeless tobacco: Never Used   Substance Use Topics     Alcohol use: Yes     Comment: rare     Drug use: Never      Past medical history and social history were reviewed.    Physical Examination:  There were no vitals taken for this visit.  Wt Readings from Last 10 Encounters:   04/26/22 64 kg (141 lb 3.2 oz)   04/07/22 64.7 kg (142 lb 9.6 oz)   03/25/22 66.2 kg (146 lb)   03/10/22 66.2 kg (146 lb)   02/08/22 63 kg (138 lb 14.4 oz)   01/28/22 65.8 kg (145 lb)   01/11/22 65.1 kg (143 lb 8 oz)   12/09/21 66.7 kg (147 lb)   12/09/21 67 kg (147 lb 11.2 oz)   12/08/21 64.8 kg (142 lb 13.7 oz)     Unable to do physical exam 2/2 telephone visit. Well sounding in no distress. Normal speech and thought process. Good voice quality. No audible wheezing or cough    Laboratory Data:   Latest Reference Range & Units 05/09/22 09:40   Sodium 133 - 144 mmol/L 139   Potassium 3.4 - 5.3 mmol/L 5.1 [1]   Chloride 94 - 109 mmol/L 109   Carbon Dioxide 20 - 32 mmol/L 27   Urea Nitrogen 7 - 30 mg/dL 17   Creatinine 0.66 - 1.25 mg/dL 1.16   GFR Estimate >60 mL/min/1.73m2 67 [2]   Calcium 8.5 - 10.1 mg/dL 9.2   Anion Gap 3 - 14 mmol/L 3   Magnesium  1.6 - 2.3 mg/dL 2.2   Phosphorus 2.5 - 4.5 mg/dL 2.4 (L)   Albumin 3.4 - 5.0 g/dL 3.2 (L)   Protein Total 6.8 - 8.8 g/dL 7.2   Bilirubin Total 0.2 - 1.3 mg/dL 0.3   Alkaline Phosphatase 40 - 150 U/L 125   ALT 0 - 70 U/L 18   AST 0 - 45 U/L 29 [3]   Glucose 70 - 99 mg/dL 102 (H)   WBC 4.0 - 11.0 10e3/uL 6.7   Hemoglobin 13.3 - 17.7 g/dL 10.4 (L)   Hematocrit 40.0 - 53.0 % 32.5 (L)   Platelet Count 150 - 450 10e3/uL 231   RBC Count 4.40 - 5.90 10e6/uL 3.39 (L)   MCV 78 - 100 fL 96   MCH 26.5 - 33.0 pg 30.7   MCHC 31.5 - 36.5 g/dL 32.0   RDW 10.0 - 15.0 % 14.4   % Neutrophils % 52   % Lymphocytes % 32   % Monocytes % 14   % Eosinophils % 1   % Basophils % 1   Absolute Basophils 0.0 - 0.2 10e3/uL 0.1   Absolute Eosinophils 0.0 - 0.7 10e3/uL 0.1   Absolute Immature Granulocytes <=0.4 10e3/uL 0.0   Absolute Lymphocytes 0.8 - 5.3 10e3/uL 2.2   Absolute Monocytes 0.0 - 1.3 10e3/uL 1.0   % Immature Granulocytes % 0   Absolute Neutrophils 1.6 - 8.3 10e3/uL 3.5   Absolute NRBCs 10e3/uL 0.0   NRBCs per 100 WBC <1 /100 0   (L): Data is abnormally low  (H): Data is abnormally high  [1] Specimen slightly hemolyzed, potassium may be falsely elevated.  [2] Effective December 21, 2021 eGFRcr in adults is calculated using the 2021 CKD-EPI creatinine equation which includes age and gender (Chiki mackay al., [place], DOI: 10.1056/FPYDjj6609947)  [3] Specimen is hemolyzed which can falsely elevate AST. Analysis of a non-hemolyzed specimen may result in a lower value.    CT CAP 5/9/22  FINDINGS:   LUNGS AND PLEURA: Stable 2 mm nodule in the lateral left lower lobe on  image 189 of series 4. Otherwise, the lungs are clear.     MEDIASTINUM/AXILLAE: No axillary adenopathy. Irregular soft tissue  mass in the anterior mediastinum is unchanged measuring approximately  2.4 cm on image 66 of series 3. No pericardial effusion. Mild coronary  artery calcification is present.     HEPATOBILIARY: Several stable subcentimeter low-attenuation lesions  are seen  throughout the liver that are too small to definitely  characterize, but statistically likely represent cysts or hemangiomas.     PANCREAS: Normal.     SPLEEN: No significant change in size of the soft tissue mass  involving the posterior aspect of the upper spleen and extending to  the left hemidiaphragm. This measures 7.2 x 5.5 cm on image 119  compared to 6.8 x 5.1 cm on the previous exam. Otherwise the spleen is  unremarkable.     ADRENAL GLANDS: Normal.     KIDNEYS/BLADDER: Stable small cysts throughout both kidneys. No  evidence of enhancing mass or hydronephrosis on either side. The  prostate is enlarged and indents the underside of the bladder. There  is mild diffuse bladder wall thickening, likely due to incomplete  distention.     BOWEL: Normal.     PELVIC ORGANS: No free fluid, adenopathy, or inflammatory change  through the pelvis.     ADDITIONAL FINDINGS: None.     MUSCULOSKELETAL: Degenerative changes are noted through the spine.                                                                      IMPRESSION:  1.  No significant change in size of the irregular soft tissue mass  arising from the posterior spleen and extending to the left  hemidiaphragm.  2.  No significant change in irregular soft tissue mass in the  anterior left mediastinum.  3.  Stable tiny nodule in the lateral left lower lobe.     OPAL SINGLETON MD     Assessment and Plan:  1. 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 High PD-L1. Was on Keytruda but had progression, now on Doxil + Ifos started 10/26/21. Port placed 1/28/22.     He completed 4 cycles of Doxil + Ifos, has had multiple tolerance issues including nausea/vomiting, dehydration, neurotoxicity, mucositis, skin toxicity, pancytopenia, hypokalemia, hypophosphatemia. CT with positive response.      Now on Doxil alone- continues to have tolerance issues with signficant mucositis/thrush, skin sores, weakness, dehydration,  HUSSEIN.     CT CAP with stable disease. Echo to be done Monday. He is doing better but still has mouth and skin sores.    Delay treatment by another few days (defer Doxil to Monday 5/16) so we can try additional thrush treatment, allow for healing, and see how echo looks.    Will get labs and IVF tomorrow. Will discuss Doxil dose with Dr. Wolff.      2. GI  Dyspepsia: Continue Omeprazole 40mg daily. Continue Tums PRN     CINV:  Zofran PRN.      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 Cal Saba endocrine.     Hypothyroidism continue Synthroid 75mcg daily.       4. Derm  Rosacea: Long standing issue, continue Metrogel PRN     Hand/foot: 2/2 Doxil, icing hands and feet. Continue emollients at home. Kenalog cream for bothersome/blistering and red areas. Delaying treatment a few more days to allow for ongoing healing.      5. MSK  Left shoulder/back/chest wall pain 2/2 tumor, following with palliative. No pain currently, off all pain medications.       6. ENT  Hoarseness: Thought 2/2 mediastinal mass vs irritation from prior biopsy. Following with ENT, s/p vocal cord injection 7/1/21. Following with voice clinic.      Mucositis/Thrush: Continues to be significant. Will do one week of high dose fluconazole 400mg daily, then go back to 100mg ppx dose. Continue nystatin, healios, salt/soda and hold treatment as above. Re-reaching out to ENT.      7. Pulm/Cards  Working with speech on laryngreal dysfunction.     SOB getting worse as is hoarseness. CT stable. Better today. Will monitor for echo results on Monday.      Continue Guaifenesin-Coedine PRN for cough, much improved.      Off statin due to LFT elevation, improved.      8. FEN  Hypokalemia: Recurrent issue, continue 20meq daily. He feels better on supplement.      Hypophosphatemia: Off supplement, occasionally a bit low but overall better. Monitor.     Dehydration with HUSSEIN: Will get labs and IVF tomorrow. Delaying treatment as above.  Will continue twice weekly labs and fluids     9. Psych/Neuro  Anxiety/depression: Improved, on Lexapro and feels like it is helping. Continue.      Continue PRN Imitrex for migraines.      10. Heme  Pancytopenia 2/2 chemotherapy. Much improved on Doxil alone.     PLAN  -High dose fluconazole x 1 week and will reach out to ENT again  -Hold Doxil tomorrow  -Do labs (CBC, CMP, Mag, Phos) tomorrow and IVF  -Echo Monday 5/16  -Telephone visit Maynor Monday after echo  -Labs and Doxil Monday afternoon 5/16    30 minutes spent on the date of the encounter doing chart review, review of test results, interpretation of tests, patient visit and documentation     Maynor Rios PA-C  Department of Hematology and Oncology  [facility]       Ifrah is a 71 year old who is being evaluated via a billable video visit.      How would you like to obtain your AVS? MyChart  If the video visit is dropped, the invitation should be resent by: Text to cell phone: [phone]  Will anyone else be joining your video visit? No    Mona Robledo    Patient Internet down-changed to telephone, 20 minutes.      Again, thank you for allowing me to participate in the care of your patient.        Sincerely,        GERALDO Mullins

## 2022-05-11 NOTE — PROGRESS NOTES
Oncology/Hematology Visit Note  May 11, 2022    Reason for Visit: Follow up of spindle cell sarcoma     History of Present Illness: Ifrah Huitron is a 71 year old male with PMH rosacea, pituitary macroadenoma s/p resection, GERD, kidney stones, DJD with metastatic spindle cell sarcoma. He presented to his PCP March 2021 with chest pain/left shoulder and back pain that led to imaging which revealed multiple lung mets. There were difficulties in getting diagnostic biopsy, eventually biopsy of mediastinal mass with spindle cell sarcoma. There was high PD-L1 expression (90%). Baseline imaging 6/21/21 with progression of lung mets and left-sided subdiaphragmatic mass, stable liver lesions. 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      He started Keytruda 6/21/21. CT 8/2/21 with positive response to treatment. CT 10/18/21 with mixed response- LUQ mass larger, anterior mediastinal and left anterior pleural mass smaller. Keytruda stopped.     Started on Doxil + Ifosfamide 10/26/21. Poorly tolerated with mucositis, nausea, poor oral intake. CT with stable to positive response.      Received C2 12/1/21 (delayed for tolerance issues) with 10% dose reduction.     Received C3 1/4/22 with same 10% dose reduction. He had issues with nausea inpatient along with recurrent thrush.      Received C4 2/2/22 with same 10% dose reduction. Had more significant neurotoxicity so only received 5.5 days. Symptoms resolved after stopping Ifosfamide.      CT 3/8/22 with positive response to treatment.      He is now on Doxil alone but has been dealing with worsening mouth and skin toxicity.     Interval History:  Ifrah was called today for follow-up. He still is feeling OK. Mouth is still sore and skin is sore but all improving. His mouth still has a white coating, despite taking fluconazole daily, healios, nystatin, salt/soda. He still has a hard time eating  "normally, doing mostly Ramen noodles. The sores on his hips, knees, and arms are now dry and closed but not fully healed. He is still very fatigued, needing naps throughout the day. Continues to have WALKER, though slightly better this week. No chest pain or SOB at rest.    Dizziness is improved. No nausea/vomiting, though takes Zofran intermittently. Had diarrhea after eating a hamburger, now better. No bleeding, swelling.    Current Outpatient Medications   Medication Sig Dispense Refill     cholecalciferol 25 MCG (1000 UT) TABS Take 1,000 Units by mouth daily        escitalopram (LEXAPRO) 10 MG tablet Take 1 tablet (10 mg) by mouth daily 30 tablet 3     fluconazole (DIFLUCAN) 100 MG tablet Take 1 tablet (100 mg) by mouth daily 30 tablet 3     guaiFENesin-codeine (GUAIFENESIN AC) 100-10 MG/5ML syrup Take 10 mLs by mouth every 4 hours as needed for cough (Patient taking differently: Take 10 mLs by mouth every 4 hours as needed for cough prn) 118 mL 0     hydrocortisone (CORTEF) 10 MG tablet Take 20 mg in the morning and 10 mg in the afternoon 270 tablet 3     Insulin Syringe-Needle U-100 27.5G X 5/8\" 2 ML MISC 1 each daily as needed (with solucortef for adrenal crisis) 10 each 1     levothyroxine (SYNTHROID/LEVOTHROID) 75 MCG tablet Take 75 mcg by mouth       levothyroxine (SYNTHROID/LEVOTHROID) 75 MCG tablet Take 1 tablet (75 mcg) by mouth every morning 90 tablet 3     Menthol-Methyl Salicylate (DALIA MALIK GREASELESS) cream Apply topically 2 times daily       metroNIDAZOLE (METROGEL) 1 % external gel Apply topically daily .Try stronger dose due to increased symptoms after chemo. 30 g 0     OLANZapine (ZYPREXA) 5 MG tablet Take 1 tablet (5 mg) by mouth At Bedtime Continue until your nausea resolves (Patient taking differently: Take 5 mg by mouth At Bedtime Continue until your nausea resolves; prn) 30 tablet 1     omeprazole (PRILOSEC) 20 MG DR capsule Take 2 capsules (40 mg) by mouth daily 60 capsule 1     ondansetron " (ZOFRAN) 4 MG tablet Take 1-2 tablets (4-8 mg) by mouth every 8 hours as needed for nausea 60 tablet 3     potassium chloride ER (KLOR-CON M) 20 MEQ CR tablet Take 1 tablet (20 mEq) by mouth daily 30 tablet 1     prochlorperazine (COMPAZINE) 5 MG tablet Take 1 tablet (5 mg) by mouth every 6 hours as needed for nausea or vomiting . Caution: causes sedation. (Patient taking differently: Take 5 mg by mouth every 6 hours as needed for nausea or vomiting . Caution: causes sedation. PRN) 30 tablet 1     SUMAtriptan (IMITREX) 50 MG tablet Take 1 tablet (50 mg) by mouth at onset of headache for migraine May repeat in 2 hours. Max 4 tablets/24 hours. (Patient taking differently: Take 50 mg by mouth at onset of headache for migraine May repeat in 2 hours. Max 4 tablets/24 hours. PRN) 10 tablet 3     triamcinolone (KENALOG) 0.1 % external cream Apply topically 2 times daily to bothersome skin areas. 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     Thyroid disease     removed pituitary gland     Past Surgical History:   Procedure Laterality Date     COLONOSCOPY N/A 12/17/2020    Procedure: COLONOSCOPY;  Surgeon: Ken Camacho MD;  Location: [facility]     ENT SURGERY       HERNIA REPAIR       INSERT PORT VASCULAR ACCESS Right 1/28/2022    Procedure: INSERTION, VASCULAR ACCESS PORT;  Surgeon: Daniel Kinney MD;  [place] [facility] [place]     IR CHEST PORT PLACEMENT > 5 YRS OF AGE  1/28/2022     LARYNGOSCOPY, EXCISE VOCAL CORD LESION MICROSCOPIC, COMBINED Left 07/01/2021    Procedure: MICROLARYNGOSCOPY, LEFT TRUE VOCAL CORD INJECTION WITH PROLARYN;  Surgeon: Kyree Bearden MD;  Location: [facility]     PHACOEMULSIFICATION WITH STANDARD INTRAOCULAR LENS IMPLANT Right 03/10/2021    Procedure: Cataract removal with implant.;  Surgeon: Jamir Mac MD;  Location: [facility]     PHACOEMULSIFICATION WITH STANDARD  INTRAOCULAR LENS IMPLANT Left 04/05/2021    Procedure: Cataract removal with implant.;  Surgeon: Jamir Mac MD;  Location: [facility]     PICC DOUBLE LUMEN PLACEMENT Right 12/01/2021    5FR DL PICC, basilic vein. L-38cm, 1cm out.     PICC DOUBLE LUMEN PLACEMENT Right 01/04/2022    Right cephalic, 41 cm, 1 external length     PITUITARY EXCISION       tooth pulled 4/7  Right      Allergies   Allergen Reactions     Penicillins Hives and Swelling            Social History   Social History     Tobacco Use     Smoking status: Never Smoker     Smokeless tobacco: Never Used   Substance Use Topics     Alcohol use: Yes     Comment: rare     Drug use: Never      Past medical history and social history were reviewed.    Physical Examination:  There were no vitals taken for this visit.  Wt Readings from Last 10 Encounters:   04/26/22 64 kg (141 lb 3.2 oz)   04/07/22 64.7 kg (142 lb 9.6 oz)   03/25/22 66.2 kg (146 lb)   03/10/22 66.2 kg (146 lb)   02/08/22 63 kg (138 lb 14.4 oz)   01/28/22 65.8 kg (145 lb)   01/11/22 65.1 kg (143 lb 8 oz)   12/09/21 66.7 kg (147 lb)   12/09/21 67 kg (147 lb 11.2 oz)   12/08/21 64.8 kg (142 lb 13.7 oz)     Unable to do physical exam 2/2 telephone visit. Well sounding in no distress. Normal speech and thought process. Good voice quality. No audible wheezing or cough    Laboratory Data:   Latest Reference Range & Units 05/09/22 09:40   Sodium 133 - 144 mmol/L 139   Potassium 3.4 - 5.3 mmol/L 5.1 [1]   Chloride 94 - 109 mmol/L 109   Carbon Dioxide 20 - 32 mmol/L 27   Urea Nitrogen 7 - 30 mg/dL 17   Creatinine 0.66 - 1.25 mg/dL 1.16   GFR Estimate >60 mL/min/1.73m2 67 [2]   Calcium 8.5 - 10.1 mg/dL 9.2   Anion Gap 3 - 14 mmol/L 3   Magnesium 1.6 - 2.3 mg/dL 2.2   Phosphorus 2.5 - 4.5 mg/dL 2.4 (L)   Albumin 3.4 - 5.0 g/dL 3.2 (L)   Protein Total 6.8 - 8.8 g/dL 7.2   Bilirubin Total 0.2 - 1.3 mg/dL 0.3   Alkaline Phosphatase 40 - 150 U/L 125   ALT 0 - 70 U/L 18   AST 0 - 45 U/L 29 [3]    Glucose 70 - 99 mg/dL 102 (H)   WBC 4.0 - 11.0 10e3/uL 6.7   Hemoglobin 13.3 - 17.7 g/dL 10.4 (L)   Hematocrit 40.0 - 53.0 % 32.5 (L)   Platelet Count 150 - 450 10e3/uL 231   RBC Count 4.40 - 5.90 10e6/uL 3.39 (L)   MCV 78 - 100 fL 96   MCH 26.5 - 33.0 pg 30.7   MCHC 31.5 - 36.5 g/dL 32.0   RDW 10.0 - 15.0 % 14.4   % Neutrophils % 52   % Lymphocytes % 32   % Monocytes % 14   % Eosinophils % 1   % Basophils % 1   Absolute Basophils 0.0 - 0.2 10e3/uL 0.1   Absolute Eosinophils 0.0 - 0.7 10e3/uL 0.1   Absolute Immature Granulocytes <=0.4 10e3/uL 0.0   Absolute Lymphocytes 0.8 - 5.3 10e3/uL 2.2   Absolute Monocytes 0.0 - 1.3 10e3/uL 1.0   % Immature Granulocytes % 0   Absolute Neutrophils 1.6 - 8.3 10e3/uL 3.5   Absolute NRBCs 10e3/uL 0.0   NRBCs per 100 WBC <1 /100 0   (L): Data is abnormally low  (H): Data is abnormally high  [1] Specimen slightly hemolyzed, potassium may be falsely elevated.  [2] Effective December 21, 2021 eGFRcr in adults is calculated using the 2021 CKD-EPI creatinine equation which includes age and gender (Chiki mackay al., NEJ, DOI: 10.1056/SKPUxw3872110)  [3] Specimen is hemolyzed which can falsely elevate AST. Analysis of a non-hemolyzed specimen may result in a lower value.    CT CAP 5/9/22  FINDINGS:   LUNGS AND PLEURA: Stable 2 mm nodule in the lateral left lower lobe on  image 189 of series 4. Otherwise, the lungs are clear.     MEDIASTINUM/AXILLAE: No axillary adenopathy. Irregular soft tissue  mass in the anterior mediastinum is unchanged measuring approximately  2.4 cm on image 66 of series 3. No pericardial effusion. Mild coronary  artery calcification is present.     HEPATOBILIARY: Several stable subcentimeter low-attenuation lesions  are seen throughout the liver that are too small to definitely  characterize, but statistically likely represent cysts or hemangiomas.     PANCREAS: Normal.     SPLEEN: No significant change in size of the soft tissue mass  involving the posterior aspect  of the upper spleen and extending to  the left hemidiaphragm. This measures 7.2 x 5.5 cm on image 119  compared to 6.8 x 5.1 cm on the previous exam. Otherwise the spleen is  unremarkable.     ADRENAL GLANDS: Normal.     KIDNEYS/BLADDER: Stable small cysts throughout both kidneys. No  evidence of enhancing mass or hydronephrosis on either side. The  prostate is enlarged and indents the underside of the bladder. There  is mild diffuse bladder wall thickening, likely due to incomplete  distention.     BOWEL: Normal.     PELVIC ORGANS: No free fluid, adenopathy, or inflammatory change  through the pelvis.     ADDITIONAL FINDINGS: None.     MUSCULOSKELETAL: Degenerative changes are noted through the spine.                                                                      IMPRESSION:  1.  No significant change in size of the irregular soft tissue mass  arising from the posterior spleen and extending to the left  hemidiaphragm.  2.  No significant change in irregular soft tissue mass in the  anterior left mediastinum.  3.  Stable tiny nodule in the lateral left lower lobe.     OPAL SINGLETON MD     Assessment and Plan:  1. 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 High PD-L1. Was on Keytruda but had progression, now on Doxil + Ifos started 10/26/21. Port placed 1/28/22.     He completed 4 cycles of Doxil + Ifos, has had multiple tolerance issues including nausea/vomiting, dehydration, neurotoxicity, mucositis, skin toxicity, pancytopenia, hypokalemia, hypophosphatemia. CT with positive response.      Now on Doxil alone- continues to have tolerance issues with signficant mucositis/thrush, skin sores, weakness, dehydration, HUSSEIN.     CT CAP with stable disease. Echo to be done Monday. He is doing better but still has mouth and skin sores.    Delay treatment by another few days (defer Doxil to Monday 5/16) so we can try additional thrush treatment, allow for healing, and  see how echo looks.    Will get labs and IVF tomorrow. Will discuss Doxil dose with Dr. Wolff.      2. GI  Dyspepsia: Continue Omeprazole 40mg daily. Continue Tums PRN     CINV:  Zofran PRN.      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 Cal Saba endocrine.     Hypothyroidism continue Synthroid 75mcg daily.       4. Derm  Rosacea: Long standing issue, continue Metrogel PRN     Hand/foot: 2/2 Doxil, icing hands and feet. Continue emollients at home. Kenalog cream for bothersome/blistering and red areas. Delaying treatment a few more days to allow for ongoing healing.      5. MSK  Left shoulder/back/chest wall pain 2/2 tumor, following with palliative. No pain currently, off all pain medications.       6. ENT  Hoarseness: Thought 2/2 mediastinal mass vs irritation from prior biopsy. Following with ENT, s/p vocal cord injection 7/1/21. Following with voice clinic.      Mucositis/Thrush: Continues to be significant. Will do one week of high dose fluconazole 400mg daily, then go back to 100mg ppx dose. Continue nystatin, healios, salt/soda and hold treatment as above. Re-reaching out to ENT.      7. Pulm/Cards  Working with speech on laryngreal dysfunction.     SOB getting worse as is hoarseness. CT stable. Better today. Will monitor for echo results on Monday.      Continue Guaifenesin-[place] PRN for cough, much improved.      Off statin due to LFT elevation, improved.      8. FEN  Hypokalemia: Recurrent issue, continue 20meq daily. He feels better on supplement.      Hypophosphatemia: Off supplement, occasionally a bit low but overall better. Monitor.     Dehydration with HUSSEIN: Will get labs and IVF tomorrow. Delaying treatment as above. Will continue twice weekly labs and fluids     9. Psych/Neuro  Anxiety/depression: Improved, on Lexapro and feels like it is helping. Continue.      Continue PRN Imitrex for migraines.      10. Heme  Pancytopenia 2/2 chemotherapy. Much improved on Doxil  alone.     PLAN  -High dose fluconazole x 1 week and will reach out to ENT again  -Hold Doxil tomorrow  -Do labs (CBC, CMP, Mag, Phos) tomorrow and IVF  -Echo Monday 5/16  -Telephone visit Maynor Monday after echo  -Labs and Doxil Monday afternoon 5/16    30 minutes spent on the date of the encounter doing chart review, review of test results, interpretation of tests, patient visit and documentation     Maynor Rios PA-C  Department of Hematology and Oncology  [facility]

## 2022-05-11 NOTE — LETTER
5/11/2022         RE: Ifrah Huitron  [address] [facility] [address]        Dear Colleague,    Thank you for referring your patient, Ifrah Huitron, to the [facility]. Please see a copy of my visit note below.    Oncology/Hematology Visit Note  May 11, 2022    Reason for Visit: Follow up of spindle cell sarcoma     History of Present Illness: Ifrah Huitron is a 71 year old male with PMH rosacea, pituitary macroadenoma s/p resection, GERD, kidney stones, DJD with metastatic spindle cell sarcoma. He presented to his PCP March 2021 with chest pain/left shoulder and back pain that led to imaging which revealed multiple lung mets. There were difficulties in getting diagnostic biopsy, eventually biopsy of mediastinal mass with spindle cell sarcoma. There was high PD-L1 expression (90%). Baseline imaging 6/21/21 with progression of lung mets and left-sided subdiaphragmatic mass, stable liver lesions. 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      He started Keytruda 6/21/21. CT 8/2/21 with positive response to treatment. CT 10/18/21 with mixed response- LUQ mass larger, anterior mediastinal and left anterior pleural mass smaller. Keytruda stopped.     Started on Doxil + Ifosfamide 10/26/21. Poorly tolerated with mucositis, nausea, poor oral intake. CT with stable to positive response.      Received C2 12/1/21 (delayed for tolerance issues) with 10% dose reduction.     Received C3 1/4/22 with same 10% dose reduction. He had issues with nausea inpatient along with recurrent thrush.      Received C4 2/2/22 with same 10% dose reduction. Had more significant neurotoxicity so only received 5.5 days. Symptoms resolved after stopping Ifosfamide.      CT 3/8/22 with positive response to treatment.      He is now on Doxil alone but has been dealing with worsening mouth and skin toxicity.     Interval  "History:  Ifrah was called today for follow-up. He still is feeling OK. Mouth is still sore and skin is sore but all improving. His mouth still has a white coating, despite taking fluconazole daily, healios, nystatin, salt/soda. He still has a hard time eating normally, doing mostly Ramen noodles. The sores on his hips, knees, and arms are now dry and closed but not fully healed. He is still very fatigued, needing naps throughout the day. Continues to have WALKER, though slightly better this week. No chest pain or SOB at rest.    Dizziness is improved. No nausea/vomiting, though takes Zofran intermittently. Had diarrhea after eating a hamburger, now better. No bleeding, swelling.    Current Outpatient Medications   Medication Sig Dispense Refill     cholecalciferol 25 MCG (1000 UT) TABS Take 1,000 Units by mouth daily        escitalopram (LEXAPRO) 10 MG tablet Take 1 tablet (10 mg) by mouth daily 30 tablet 3     fluconazole (DIFLUCAN) 100 MG tablet Take 1 tablet (100 mg) by mouth daily 30 tablet 3     guaiFENesin-codeine (GUAIFENESIN AC) 100-10 MG/5ML syrup Take 10 mLs by mouth every 4 hours as needed for cough (Patient taking differently: Take 10 mLs by mouth every 4 hours as needed for cough prn) 118 mL 0     hydrocortisone (CORTEF) 10 MG tablet Take 20 mg in the morning and 10 mg in the afternoon 270 tablet 3     Insulin Syringe-Needle U-100 27.5G X 5/8\" 2 ML MISC 1 each daily as needed (with solucortef for adrenal crisis) 10 each 1     levothyroxine (SYNTHROID/LEVOTHROID) 75 MCG tablet Take 75 mcg by mouth       levothyroxine (SYNTHROID/LEVOTHROID) 75 MCG tablet Take 1 tablet (75 mcg) by mouth every morning 90 tablet 3     Menthol-Methyl Salicylate (DALIA MAILK GREASELESS) cream Apply topically 2 times daily       metroNIDAZOLE (METROGEL) 1 % external gel Apply topically daily .Try stronger dose due to increased symptoms after chemo. 30 g 0     OLANZapine (ZYPREXA) 5 MG tablet Take 1 tablet (5 mg) by mouth At Bedtime " Continue until your nausea resolves (Patient taking differently: Take 5 mg by mouth At Bedtime Continue until your nausea resolves; prn) 30 tablet 1     omeprazole (PRILOSEC) 20 MG DR capsule Take 2 capsules (40 mg) by mouth daily 60 capsule 1     ondansetron (ZOFRAN) 4 MG tablet Take 1-2 tablets (4-8 mg) by mouth every 8 hours as needed for nausea 60 tablet 3     potassium chloride ER (KLOR-CON M) 20 MEQ CR tablet Take 1 tablet (20 mEq) by mouth daily 30 tablet 1     prochlorperazine (COMPAZINE) 5 MG tablet Take 1 tablet (5 mg) by mouth every 6 hours as needed for nausea or vomiting . Caution: causes sedation. (Patient taking differently: Take 5 mg by mouth every 6 hours as needed for nausea or vomiting . Caution: causes sedation. PRN) 30 tablet 1     SUMAtriptan (IMITREX) 50 MG tablet Take 1 tablet (50 mg) by mouth at onset of headache for migraine May repeat in 2 hours. Max 4 tablets/24 hours. (Patient taking differently: Take 50 mg by mouth at onset of headache for migraine May repeat in 2 hours. Max 4 tablets/24 hours. PRN) 10 tablet 3     triamcinolone (KENALOG) 0.1 % external cream Apply topically 2 times daily to bothersome skin areas. 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     Thyroid disease     removed pituitary gland     Past Surgical History:   Procedure Laterality Date     COLONOSCOPY N/A 12/17/2020    Procedure: COLONOSCOPY;  Surgeon: Ken Camacho MD;  Location: [facility]     ENT SURGERY       HERNIA REPAIR       INSERT PORT VASCULAR ACCESS Right 1/28/2022    Procedure: INSERTION, VASCULAR ACCESS PORT;  Surgeon: Daniel Kinney MD;  [place] [facility] [place]     IR CHEST PORT PLACEMENT > 5 YRS OF AGE  1/28/2022     LARYNGOSCOPY, EXCISE VOCAL CORD LESION MICROSCOPIC, COMBINED Left 07/01/2021    Procedure: MICROLARYNGOSCOPY, LEFT TRUE VOCAL CORD INJECTION WITH PROLARYN;  Surgeon:  Kyree Bearden MD;  Location: [facility]     PHACOEMULSIFICATION WITH STANDARD INTRAOCULAR LENS IMPLANT Right 03/10/2021    Procedure: Cataract removal with implant.;  Surgeon: Jamir Mac MD;  Location: [facility]     PHACOEMULSIFICATION WITH STANDARD INTRAOCULAR LENS IMPLANT Left 04/05/2021    Procedure: Cataract removal with implant.;  Surgeon: Jamir Mac MD;  Location: [facility]     PICC DOUBLE LUMEN PLACEMENT Right 12/01/2021    5FR DL PICC, basilic vein. L-38cm, 1cm out.     PICC DOUBLE LUMEN PLACEMENT Right 01/04/2022    Right cephalic, 41 cm, 1 external length     PITUITARY EXCISION       tooth pulled 4/7  Right      Allergies   Allergen Reactions     Penicillins Hives and Swelling            Social History   Social History     Tobacco Use     Smoking status: Never Smoker     Smokeless tobacco: Never Used   Substance Use Topics     Alcohol use: Yes     Comment: rare     Drug use: Never      Past medical history and social history were reviewed.    Physical Examination:  There were no vitals taken for this visit.  Wt Readings from Last 10 Encounters:   04/26/22 64 kg (141 lb 3.2 oz)   04/07/22 64.7 kg (142 lb 9.6 oz)   03/25/22 66.2 kg (146 lb)   03/10/22 66.2 kg (146 lb)   02/08/22 63 kg (138 lb 14.4 oz)   01/28/22 65.8 kg (145 lb)   01/11/22 65.1 kg (143 lb 8 oz)   12/09/21 66.7 kg (147 lb)   12/09/21 67 kg (147 lb 11.2 oz)   12/08/21 64.8 kg (142 lb 13.7 oz)     Unable to do physical exam 2/2 telephone visit. Well sounding in no distress. Normal speech and thought process. Good voice quality. No audible wheezing or cough    Laboratory Data:   Latest Reference Range & Units 05/09/22 09:40   Sodium 133 - 144 mmol/L 139   Potassium 3.4 - 5.3 mmol/L 5.1 [1]   Chloride 94 - 109 mmol/L 109   Carbon Dioxide 20 - 32 mmol/L 27   Urea Nitrogen 7 - 30 mg/dL 17   Creatinine 0.66 - 1.25 mg/dL 1.16   GFR Estimate >60 mL/min/1.73m2 67 [2]   Calcium 8.5 - 10.1 mg/dL 9.2   Anion Gap 3 - 14 mmol/L 3   Magnesium  1.6 - 2.3 mg/dL 2.2   Phosphorus 2.5 - 4.5 mg/dL 2.4 (L)   Albumin 3.4 - 5.0 g/dL 3.2 (L)   Protein Total 6.8 - 8.8 g/dL 7.2   Bilirubin Total 0.2 - 1.3 mg/dL 0.3   Alkaline Phosphatase 40 - 150 U/L 125   ALT 0 - 70 U/L 18   AST 0 - 45 U/L 29 [3]   Glucose 70 - 99 mg/dL 102 (H)   WBC 4.0 - 11.0 10e3/uL 6.7   Hemoglobin 13.3 - 17.7 g/dL 10.4 (L)   Hematocrit 40.0 - 53.0 % 32.5 (L)   Platelet Count 150 - 450 10e3/uL 231   RBC Count 4.40 - 5.90 10e6/uL 3.39 (L)   MCV 78 - 100 fL 96   MCH 26.5 - 33.0 pg 30.7   MCHC 31.5 - 36.5 g/dL 32.0   RDW 10.0 - 15.0 % 14.4   % Neutrophils % 52   % Lymphocytes % 32   % Monocytes % 14   % Eosinophils % 1   % Basophils % 1   Absolute Basophils 0.0 - 0.2 10e3/uL 0.1   Absolute Eosinophils 0.0 - 0.7 10e3/uL 0.1   Absolute Immature Granulocytes <=0.4 10e3/uL 0.0   Absolute Lymphocytes 0.8 - 5.3 10e3/uL 2.2   Absolute Monocytes 0.0 - 1.3 10e3/uL 1.0   % Immature Granulocytes % 0   Absolute Neutrophils 1.6 - 8.3 10e3/uL 3.5   Absolute NRBCs 10e3/uL 0.0   NRBCs per 100 WBC <1 /100 0   (L): Data is abnormally low  (H): Data is abnormally high  [1] Specimen slightly hemolyzed, potassium may be falsely elevated.  [2] Effective December 21, 2021 eGFRcr in adults is calculated using the 2021 CKD-EPI creatinine equation which includes age and gender (Chiki mackay al., [place], DOI: 10.1056/MAKMrk8252903)  [3] Specimen is hemolyzed which can falsely elevate AST. Analysis of a non-hemolyzed specimen may result in a lower value.    CT CAP 5/9/22  FINDINGS:   LUNGS AND PLEURA: Stable 2 mm nodule in the lateral left lower lobe on  image 189 of series 4. Otherwise, the lungs are clear.     MEDIASTINUM/AXILLAE: No axillary adenopathy. Irregular soft tissue  mass in the anterior mediastinum is unchanged measuring approximately  2.4 cm on image 66 of series 3. No pericardial effusion. Mild coronary  artery calcification is present.     HEPATOBILIARY: Several stable subcentimeter low-attenuation lesions  are seen  throughout the liver that are too small to definitely  characterize, but statistically likely represent cysts or hemangiomas.     PANCREAS: Normal.     SPLEEN: No significant change in size of the soft tissue mass  involving the posterior aspect of the upper spleen and extending to  the left hemidiaphragm. This measures 7.2 x 5.5 cm on image 119  compared to 6.8 x 5.1 cm on the previous exam. Otherwise the spleen is  unremarkable.     ADRENAL GLANDS: Normal.     KIDNEYS/BLADDER: Stable small cysts throughout both kidneys. No  evidence of enhancing mass or hydronephrosis on either side. The  prostate is enlarged and indents the underside of the bladder. There  is mild diffuse bladder wall thickening, likely due to incomplete  distention.     BOWEL: Normal.     PELVIC ORGANS: No free fluid, adenopathy, or inflammatory change  through the pelvis.     ADDITIONAL FINDINGS: None.     MUSCULOSKELETAL: Degenerative changes are noted through the spine.                                                                      IMPRESSION:  1.  No significant change in size of the irregular soft tissue mass  arising from the posterior spleen and extending to the left  hemidiaphragm.  2.  No significant change in irregular soft tissue mass in the  anterior left mediastinum.  3.  Stable tiny nodule in the lateral left lower lobe.     OPAL SINGLETON MD     Assessment and Plan:  1. 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 High PD-L1. Was on Keytruda but had progression, now on Doxil + Ifos started 10/26/21. Port placed 1/28/22.     He completed 4 cycles of Doxil + Ifos, has had multiple tolerance issues including nausea/vomiting, dehydration, neurotoxicity, mucositis, skin toxicity, pancytopenia, hypokalemia, hypophosphatemia. CT with positive response.      Now on Doxil alone- continues to have tolerance issues with signficant mucositis/thrush, skin sores, weakness, dehydration,  HUSSEIN.     CT CAP with stable disease. Echo to be done Monday. He is doing better but still has mouth and skin sores.    Delay treatment by another few days (defer Doxil to Monday 5/16) so we can try additional thrush treatment, allow for healing, and see how echo looks.    Will get labs and IVF tomorrow. Will discuss Doxil dose with Dr. Wolff.      2. GI  Dyspepsia: Continue Omeprazole 40mg daily. Continue Tums PRN     CINV:  Zofran PRN.      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 Cal Saba endocrine.     Hypothyroidism continue Synthroid 75mcg daily.       4. Derm  Rosacea: Long standing issue, continue Metrogel PRN     Hand/foot: 2/2 Doxil, icing hands and feet. Continue emollients at home. Kenalog cream for bothersome/blistering and red areas. Delaying treatment a few more days to allow for ongoing healing.      5. MSK  Left shoulder/back/chest wall pain 2/2 tumor, following with palliative. No pain currently, off all pain medications.       6. ENT  Hoarseness: Thought 2/2 mediastinal mass vs irritation from prior biopsy. Following with ENT, s/p vocal cord injection 7/1/21. Following with voice clinic.      Mucositis/Thrush: Continues to be significant. Will do one week of high dose fluconazole 400mg daily, then go back to 100mg ppx dose. Continue nystatin, healios, salt/soda and hold treatment as above. Re-reaching out to ENT.      7. Pulm/Cards  Working with speech on laryngreal dysfunction.     SOB getting worse as is hoarseness. CT stable. Better today. Will monitor for echo results on Monday.      Continue Guaifenesin-Coedine PRN for cough, much improved.      Off statin due to LFT elevation, improved.      8. FEN  Hypokalemia: Recurrent issue, continue 20meq daily. He feels better on supplement.      Hypophosphatemia: Off supplement, occasionally a bit low but overall better. Monitor.     Dehydration with HUSSEIN: Will get labs and IVF tomorrow. Delaying treatment as above.  Will continue twice weekly labs and fluids     9. Psych/Neuro  Anxiety/depression: Improved, on Lexapro and feels like it is helping. Continue.      Continue PRN Imitrex for migraines.      10. Heme  Pancytopenia 2/2 chemotherapy. Much improved on Doxil alone.     PLAN  -High dose fluconazole x 1 week and will reach out to ENT again  -Hold Doxil tomorrow  -Do labs (CBC, CMP, Mag, Phos) tomorrow and IVF  -Echo Monday 5/16  -Telephone visit Maynor Monday after echo  -Labs and Doxil Monday afternoon 5/16    30 minutes spent on the date of the encounter doing chart review, review of test results, interpretation of tests, patient visit and documentation     Maynor Rios PA-C  Department of Hematology and Oncology  [facility]       Ifrah is a 71 year old who is being evaluated via a billable video visit.      How would you like to obtain your AVS? MyChart  If the video visit is dropped, the invitation should be resent by: Text to cell phone: [phone]  Will anyone else be joining your video visit? No    Mona Robledo    Patient Internet down-changed to telephone, 20 minutes.      Again, thank you for allowing me to participate in the care of your patient.        Sincerely,        GERALDO Mullins

## 2022-05-11 NOTE — PROGRESS NOTES
Ifrah is a 71 year old who is being evaluated via a billable video visit.      How would you like to obtain your AVS? MyChart  If the video visit is dropped, the invitation should be resent by: Text to cell phone: [phone]  Will anyone else be joining your video visit? Emma Robledo    Patient Internet down-changed to telephone, 20 minutes.

## 2022-05-12 ENCOUNTER — TELEPHONE (OUTPATIENT)
Dept: OTOLARYNGOLOGY | Facility: CLINIC | Age: 72
End: 2022-05-12

## 2022-05-12 ENCOUNTER — INFUSION THERAPY VISIT (OUTPATIENT)
Dept: INFUSION THERAPY | Facility: CLINIC | Age: 72
End: 2022-05-12
Attending: NURSE PRACTITIONER
Payer: MEDICARE

## 2022-05-12 VITALS — SYSTOLIC BLOOD PRESSURE: 113 MMHG | HEART RATE: 61 BPM | DIASTOLIC BLOOD PRESSURE: 77 MMHG | TEMPERATURE: 98.1 F

## 2022-05-12 DIAGNOSIS — R11.0 CHEMOTHERAPY-INDUCED NAUSEA: Primary | ICD-10-CM

## 2022-05-12 DIAGNOSIS — C49.9 SPINDLE CELL SARCOMA (H): ICD-10-CM

## 2022-05-12 DIAGNOSIS — C49.9 SARCOMA (H): ICD-10-CM

## 2022-05-12 DIAGNOSIS — T45.1X5A CHEMOTHERAPY-INDUCED NAUSEA: Primary | ICD-10-CM

## 2022-05-12 LAB
ALBUMIN SERPL-MCNC: 3.2 G/DL (ref 3.4–5)
ALP SERPL-CCNC: 132 U/L (ref 40–150)
ALT SERPL W P-5'-P-CCNC: 18 U/L (ref 0–70)
ANION GAP SERPL CALCULATED.3IONS-SCNC: 5 MMOL/L (ref 3–14)
AST SERPL W P-5'-P-CCNC: 21 U/L (ref 0–45)
BASOPHILS # BLD AUTO: 0.1 10E3/UL (ref 0–0.2)
BASOPHILS NFR BLD AUTO: 1 %
BILIRUB SERPL-MCNC: 0.3 MG/DL (ref 0.2–1.3)
BUN SERPL-MCNC: 17 MG/DL (ref 7–30)
CALCIUM SERPL-MCNC: 9.4 MG/DL (ref 8.5–10.1)
CHLORIDE BLD-SCNC: 108 MMOL/L (ref 94–109)
CO2 SERPL-SCNC: 27 MMOL/L (ref 20–32)
CREAT SERPL-MCNC: 1.35 MG/DL (ref 0.66–1.25)
EOSINOPHIL # BLD AUTO: 0.1 10E3/UL (ref 0–0.7)
EOSINOPHIL NFR BLD AUTO: 1 %
ERYTHROCYTE [DISTWIDTH] IN BLOOD BY AUTOMATED COUNT: 14.5 % (ref 10–15)
GFR SERPL CREATININE-BSD FRML MDRD: 55 ML/MIN/1.73M2
GLUCOSE BLD-MCNC: 82 MG/DL (ref 70–99)
HCT VFR BLD AUTO: 32.4 % (ref 40–53)
HGB BLD-MCNC: 10.5 G/DL (ref 13.3–17.7)
IMM GRANULOCYTES # BLD: 0.1 10E3/UL
IMM GRANULOCYTES NFR BLD: 1 %
LYMPHOCYTES # BLD AUTO: 2 10E3/UL (ref 0.8–5.3)
LYMPHOCYTES NFR BLD AUTO: 29 %
MAGNESIUM SERPL-MCNC: 2.3 MG/DL (ref 1.6–2.3)
MCH RBC QN AUTO: 31.3 PG (ref 26.5–33)
MCHC RBC AUTO-ENTMCNC: 32.4 G/DL (ref 31.5–36.5)
MCV RBC AUTO: 96 FL (ref 78–100)
MONOCYTES # BLD AUTO: 1.1 10E3/UL (ref 0–1.3)
MONOCYTES NFR BLD AUTO: 16 %
NEUTROPHILS # BLD AUTO: 3.7 10E3/UL (ref 1.6–8.3)
NEUTROPHILS NFR BLD AUTO: 52 %
NRBC # BLD AUTO: 0 10E3/UL
NRBC BLD AUTO-RTO: 0 /100
PHOSPHATE SERPL-MCNC: 2.3 MG/DL (ref 2.5–4.5)
PLATELET # BLD AUTO: 213 10E3/UL (ref 150–450)
POTASSIUM BLD-SCNC: 3.4 MMOL/L (ref 3.4–5.3)
PROT SERPL-MCNC: 6.9 G/DL (ref 6.8–8.8)
RBC # BLD AUTO: 3.36 10E6/UL (ref 4.4–5.9)
SODIUM SERPL-SCNC: 140 MMOL/L (ref 133–144)
WBC # BLD AUTO: 7 10E3/UL (ref 4–11)

## 2022-05-12 PROCEDURE — 36591 DRAW BLOOD OFF VENOUS DEVICE: CPT

## 2022-05-12 PROCEDURE — 250N000011 HC RX IP 250 OP 636: Performed by: PHYSICIAN ASSISTANT

## 2022-05-12 PROCEDURE — 83735 ASSAY OF MAGNESIUM: CPT | Performed by: PHYSICIAN ASSISTANT

## 2022-05-12 PROCEDURE — 80053 COMPREHEN METABOLIC PANEL: CPT | Performed by: PHYSICIAN ASSISTANT

## 2022-05-12 PROCEDURE — 85025 COMPLETE CBC W/AUTO DIFF WBC: CPT | Performed by: PHYSICIAN ASSISTANT

## 2022-05-12 PROCEDURE — 84100 ASSAY OF PHOSPHORUS: CPT | Performed by: PHYSICIAN ASSISTANT

## 2022-05-12 PROCEDURE — 258N000003 HC RX IP 258 OP 636: Performed by: PHYSICIAN ASSISTANT

## 2022-05-12 PROCEDURE — 96360 HYDRATION IV INFUSION INIT: CPT

## 2022-05-12 RX ORDER — HEPARIN SODIUM (PORCINE) LOCK FLUSH IV SOLN 100 UNIT/ML 100 UNIT/ML
5 SOLUTION INTRAVENOUS
Status: COMPLETED | OUTPATIENT
Start: 2022-05-12 | End: 2022-05-12

## 2022-05-12 RX ORDER — HEPARIN SODIUM (PORCINE) LOCK FLUSH IV SOLN 100 UNIT/ML 100 UNIT/ML
5 SOLUTION INTRAVENOUS
Status: CANCELLED
Start: 2022-05-12

## 2022-05-12 RX ADMIN — Medication 5 ML: at 11:08

## 2022-05-12 RX ADMIN — SODIUM CHLORIDE 1000 ML: 9 INJECTION, SOLUTION INTRAVENOUS at 10:01

## 2022-05-12 NOTE — PROGRESS NOTES
Infusion Nursing Note:  Ifrah Huitron presents today for pac labs and one liter of fluids.   Patient seen by provider today: No   present during visit today: Not Applicable.    Note: N/A.      Intravenous Access:  Labs drawn without difficulty.  Implanted Port.    Treatment Conditions:  Not Applicable.      Post Infusion Assessment:  Patient tolerated infusion without incident.  Blood return noted pre and post infusion.  Site patent and intact, free from redness, edema or discomfort.  No evidence of extravasations.  Access discontinued per protocol.       Discharge Plan:   Patient discharged in stable condition accompanied by: self.  Departure Mode: Ambulatory.  Pt to return on 5/16/22 at 1:00 pm for pac labs followed by salvador Mann RN

## 2022-05-16 ENCOUNTER — LAB (OUTPATIENT)
Dept: INFUSION THERAPY | Facility: CLINIC | Age: 72
End: 2022-05-16
Attending: FAMILY MEDICINE
Payer: MEDICARE

## 2022-05-16 ENCOUNTER — HOSPITAL ENCOUNTER (OUTPATIENT)
Dept: CARDIOLOGY | Facility: CLINIC | Age: 72
Discharge: HOME | End: 2022-05-16
Attending: PHYSICIAN ASSISTANT | Admitting: PHYSICIAN ASSISTANT
Payer: MEDICARE

## 2022-05-16 ENCOUNTER — VIRTUAL VISIT (OUTPATIENT)
Dept: ONCOLOGY | Facility: CLINIC | Age: 72
End: 2022-05-16
Attending: FAMILY MEDICINE
Payer: MEDICARE

## 2022-05-16 VITALS — DIASTOLIC BLOOD PRESSURE: 83 MMHG | HEART RATE: 58 BPM | SYSTOLIC BLOOD PRESSURE: 131 MMHG

## 2022-05-16 DIAGNOSIS — C49.9 SPINDLE CELL SARCOMA (H): ICD-10-CM

## 2022-05-16 DIAGNOSIS — R11.0 CHEMOTHERAPY-INDUCED NAUSEA: ICD-10-CM

## 2022-05-16 DIAGNOSIS — C49.9 SPINDLE CELL SARCOMA (H): Primary | ICD-10-CM

## 2022-05-16 DIAGNOSIS — R06.00 DYSPNEA, UNSPECIFIED TYPE: ICD-10-CM

## 2022-05-16 DIAGNOSIS — T45.1X5A CHEMOTHERAPY-INDUCED NAUSEA: ICD-10-CM

## 2022-05-16 DIAGNOSIS — C49.9 SARCOMA (H): Primary | ICD-10-CM

## 2022-05-16 LAB
ALBUMIN SERPL-MCNC: 3.3 G/DL (ref 3.4–5)
ALP SERPL-CCNC: 138 U/L (ref 40–150)
ALT SERPL W P-5'-P-CCNC: 18 U/L (ref 0–70)
ANION GAP SERPL CALCULATED.3IONS-SCNC: 6 MMOL/L (ref 3–14)
AST SERPL W P-5'-P-CCNC: 19 U/L (ref 0–45)
BASOPHILS # BLD AUTO: 0.1 10E3/UL (ref 0–0.2)
BASOPHILS NFR BLD AUTO: 1 %
BILIRUB SERPL-MCNC: 0.3 MG/DL (ref 0.2–1.3)
BUN SERPL-MCNC: 17 MG/DL (ref 7–30)
CALCIUM SERPL-MCNC: 9.4 MG/DL (ref 8.5–10.1)
CHLORIDE BLD-SCNC: 108 MMOL/L (ref 94–109)
CO2 SERPL-SCNC: 28 MMOL/L (ref 20–32)
CREAT SERPL-MCNC: 1.49 MG/DL (ref 0.66–1.25)
EOSINOPHIL # BLD AUTO: 0.1 10E3/UL (ref 0–0.7)
EOSINOPHIL NFR BLD AUTO: 2 %
ERYTHROCYTE [DISTWIDTH] IN BLOOD BY AUTOMATED COUNT: 14.4 % (ref 10–15)
GFR SERPL CREATININE-BSD FRML MDRD: 49 ML/MIN/1.73M2
GLUCOSE BLD-MCNC: 100 MG/DL (ref 70–99)
HCT VFR BLD AUTO: 32.1 % (ref 40–53)
HGB BLD-MCNC: 10.1 G/DL (ref 13.3–17.7)
IMM GRANULOCYTES # BLD: 0 10E3/UL
IMM GRANULOCYTES NFR BLD: 1 %
LVEF ECHO: NORMAL
LYMPHOCYTES # BLD AUTO: 1.5 10E3/UL (ref 0.8–5.3)
LYMPHOCYTES NFR BLD AUTO: 18 %
MAGNESIUM SERPL-MCNC: 2.5 MG/DL (ref 1.6–2.3)
MCH RBC QN AUTO: 30.8 PG (ref 26.5–33)
MCHC RBC AUTO-ENTMCNC: 31.5 G/DL (ref 31.5–36.5)
MCV RBC AUTO: 98 FL (ref 78–100)
MONOCYTES # BLD AUTO: 0.8 10E3/UL (ref 0–1.3)
MONOCYTES NFR BLD AUTO: 9 %
NEUTROPHILS # BLD AUTO: 6 10E3/UL (ref 1.6–8.3)
NEUTROPHILS NFR BLD AUTO: 69 %
NRBC # BLD AUTO: 0 10E3/UL
NRBC BLD AUTO-RTO: 0 /100
PHOSPHATE SERPL-MCNC: 2.7 MG/DL (ref 2.5–4.5)
PLATELET # BLD AUTO: 205 10E3/UL (ref 150–450)
POTASSIUM BLD-SCNC: 3.8 MMOL/L (ref 3.4–5.3)
PROT SERPL-MCNC: 7.1 G/DL (ref 6.8–8.8)
RBC # BLD AUTO: 3.28 10E6/UL (ref 4.4–5.9)
SODIUM SERPL-SCNC: 142 MMOL/L (ref 133–144)
WBC # BLD AUTO: 8.5 10E3/UL (ref 4–11)

## 2022-05-16 PROCEDURE — 80053 COMPREHEN METABOLIC PANEL: CPT | Performed by: PHYSICIAN ASSISTANT

## 2022-05-16 PROCEDURE — 99214 OFFICE O/P EST MOD 30 MIN: CPT | Mod: 95 | Performed by: PHYSICIAN ASSISTANT

## 2022-05-16 PROCEDURE — 36591 DRAW BLOOD OFF VENOUS DEVICE: CPT

## 2022-05-16 PROCEDURE — 85025 COMPLETE CBC W/AUTO DIFF WBC: CPT | Performed by: PHYSICIAN ASSISTANT

## 2022-05-16 PROCEDURE — 93306 TTE W/DOPPLER COMPLETE: CPT | Mod: 26 | Performed by: INTERNAL MEDICINE

## 2022-05-16 PROCEDURE — 93306 TTE W/DOPPLER COMPLETE: CPT

## 2022-05-16 PROCEDURE — 83735 ASSAY OF MAGNESIUM: CPT | Performed by: PHYSICIAN ASSISTANT

## 2022-05-16 PROCEDURE — G0463 HOSPITAL OUTPT CLINIC VISIT: HCPCS | Mod: 25,TEL

## 2022-05-16 PROCEDURE — 250N000011 HC RX IP 250 OP 636: Performed by: PHYSICIAN ASSISTANT

## 2022-05-16 PROCEDURE — 82040 ASSAY OF SERUM ALBUMIN: CPT | Performed by: PHYSICIAN ASSISTANT

## 2022-05-16 PROCEDURE — 84100 ASSAY OF PHOSPHORUS: CPT | Performed by: PHYSICIAN ASSISTANT

## 2022-05-16 PROCEDURE — 96360 HYDRATION IV INFUSION INIT: CPT

## 2022-05-16 PROCEDURE — 258N000003 HC RX IP 258 OP 636: Performed by: PHYSICIAN ASSISTANT

## 2022-05-16 RX ORDER — HEPARIN SODIUM (PORCINE) LOCK FLUSH IV SOLN 100 UNIT/ML 100 UNIT/ML
5 SOLUTION INTRAVENOUS
Status: CANCELLED
Start: 2022-05-16

## 2022-05-16 RX ORDER — HEPARIN SODIUM (PORCINE) LOCK FLUSH IV SOLN 100 UNIT/ML 100 UNIT/ML
5 SOLUTION INTRAVENOUS
Status: COMPLETED | OUTPATIENT
Start: 2022-05-16 | End: 2022-05-16

## 2022-05-16 RX ADMIN — HEPARIN 5 ML: 100 SYRINGE at 15:00

## 2022-05-16 RX ADMIN — SODIUM CHLORIDE 1000 ML: 9 INJECTION, SOLUTION INTRAVENOUS at 13:57

## 2022-05-16 NOTE — PROGRESS NOTES
Infusion Nursing Note:  Ifrah Huitron presents today for PAC labs and fluids  Patient seen by provider today: Yes: Maynor Ospina   present during visit today: Not Applicable.    Note: N/A.    Intravenous Access:  Implanted Port.    Treatment Conditions:  Results reviewed, labs MET treatment parameters, ok to proceed with treatment. Ok to give fluids w/o creat result per Maynor LAUGHLIN      Post Infusion Assessment:  Patient tolerated infusion without incident.  Blood return noted pre and post infusion.  Site patent and intact, free from redness, edema or discomfort.  No evidence of extravasations.  Access discontinued per protocol.       Discharge Plan:   Patient discharged in stable condition accompanied by: self.  Departure Mode: Ambulatory.      Anup Velasco RN

## 2022-05-16 NOTE — PROGRESS NOTES
Ifrah is a 71 year old who is being evaluated via a billable video visit.      How would you like to obtain your AVS? [email or web address]  If the video visit is dropped, the invitation should be resent by: Text to cell phone: [phone]  Will anyone else be joining your video vi sit?       Telephone visit 10 minutes

## 2022-05-16 NOTE — PROGRESS NOTES
Oncology/Hematology Visit Note  May 16, 2022    Reason for Visit: Follow up of spindle cell sarcoma     History of Present Illness: Ifrah Huitron is a 71 year old male with PMH rosacea, pituitary macroadenoma s/p resection, GERD, kidney stones, DJD with metastatic spindle cell sarcoma. He presented to his PCP March 2021 with chest pain/left shoulder and back pain that led to imaging which revealed multiple lung mets. There were difficulties in getting diagnostic biopsy, eventually biopsy of mediastinal mass with spindle cell sarcoma. There was high PD-L1 expression (90%). Baseline imaging 6/21/21 with progression of lung mets and left-sided subdiaphragmatic mass, stable liver lesions. 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      He started Keytruda 6/21/21. CT 8/2/21 with positive response to treatment. CT 10/18/21 with mixed response- LUQ mass larger, anterior mediastinal and left anterior pleural mass smaller. Keytruda stopped.     Started on Doxil + Ifosfamide 10/26/21. Poorly tolerated with mucositis, nausea, poor oral intake. CT with stable to positive response.      Received C2 12/1/21 (delayed for tolerance issues) with 10% dose reduction.     Received C3 1/4/22 with same 10% dose reduction. He had issues with nausea inpatient along with recurrent thrush.      Received C4 2/2/22 with same 10% dose reduction. Had more significant neurotoxicity so only received 5.5 days. Symptoms resolved after stopping Ifosfamide.      CT 3/8/22 with positive response to treatment.      He is now on Doxil alone but has been dealing with worsening mouth and skin toxicity.     Interval History:  Ifrah was called today for follow-up. His mouth is only slightly better- still poor taste, coating on back of tongue. Not as painful. He feels like the higher dose of fluconazole hasn't done much. His skin is improved. He still gets mild WALKER but overall  "improved. He has lost weight and is weak. He will still go get fluids today. No swelling issues. No fevers. Minimal nausea though takes Zofran occasionally.    Current Outpatient Medications   Medication Sig Dispense Refill     cholecalciferol 25 MCG (1000 UT) TABS Take 1,000 Units by mouth daily        escitalopram (LEXAPRO) 10 MG tablet Take 1 tablet (10 mg) by mouth daily 30 tablet 3     fluconazole (DIFLUCAN) 200 MG tablet Take 2 tablets (400 mg) by mouth daily for 7 days . Then go back to the 100mg dose. 14 tablet 0     guaiFENesin-codeine (GUAIFENESIN AC) 100-10 MG/5ML syrup Take 10 mLs by mouth every 4 hours as needed for cough (Patient taking differently: Take 10 mLs by mouth every 4 hours as needed for cough prn) 118 mL 0     hydrocortisone (CORTEF) 10 MG tablet Take 20 mg in the morning and 10 mg in the afternoon 270 tablet 3     Insulin Syringe-Needle U-100 27.5G X 5/8\" 2 ML MISC 1 each daily as needed (with solucortef for adrenal crisis) 10 each 1     levothyroxine (SYNTHROID/LEVOTHROID) 75 MCG tablet Take 1 tablet (75 mcg) by mouth every morning 90 tablet 3     Menthol-Methyl Salicylate (DALIA MALIK GREASELESS) cream Apply topically 2 times daily       metroNIDAZOLE (METROGEL) 1 % external gel Apply topically daily .Try stronger dose due to increased symptoms after chemo. 30 g 0     omeprazole (PRILOSEC) 20 MG DR capsule Take 2 capsules (40 mg) by mouth daily 60 capsule 1     ondansetron (ZOFRAN) 4 MG tablet Take 1-2 tablets (4-8 mg) by mouth every 8 hours as needed for nausea 60 tablet 3     potassium chloride ER (KLOR-CON M) 20 MEQ CR tablet Take 1 tablet (20 mEq) by mouth daily 30 tablet 1     prochlorperazine (COMPAZINE) 5 MG tablet Take 1 tablet (5 mg) by mouth every 6 hours as needed for nausea or vomiting . Caution: causes sedation. (Patient taking differently: Take 5 mg by mouth every 6 hours as needed for nausea or vomiting . Caution: causes sedation. PRN) 30 tablet 1     SUMAtriptan (IMITREX) 50 " MG tablet Take 1 tablet (50 mg) by mouth at onset of headache for migraine May repeat in 2 hours. Max 4 tablets/24 hours. (Patient taking differently: Take 50 mg by mouth at onset of headache for migraine May repeat in 2 hours. Max 4 tablets/24 hours. PRN) 10 tablet 3     triamcinolone (KENALOG) 0.1 % external cream Apply topically 2 times daily to bothersome skin areas. 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     Thyroid disease     removed pituitary gland     Past Surgical History:   Procedure Laterality Date     COLONOSCOPY N/A 12/17/2020    Procedure: COLONOSCOPY;  Surgeon: Ken Camacho MD;  Location: [facility]     ENT SURGERY       HERNIA REPAIR       INSERT PORT VASCULAR ACCESS Right 1/28/2022    Procedure: INSERTION, VASCULAR ACCESS PORT;  Surgeon: Daniel Kinney MD;  Location: [facility] OR     IR CHEST PORT PLACEMENT > 5 YRS OF AGE  1/28/2022     LARYNGOSCOPY, EXCISE VOCAL CORD LESION MICROSCOPIC, COMBINED Left 07/01/2021    Procedure: MICROLARYNGOSCOPY, LEFT TRUE VOCAL CORD INJECTION WITH PROLARYN;  Surgeon: Kyree Bearden MD;  Location: [facility]     PHACOEMULSIFICATION WITH STANDARD INTRAOCULAR LENS IMPLANT Right 03/10/2021    Procedure: Cataract removal with implant.;  Surgeon: Jamir Mac MD;  Location: [facility]     PHACOEMULSIFICATION WITH STANDARD INTRAOCULAR LENS IMPLANT Left 04/05/2021    Procedure: Cataract removal with implant.;  Surgeon: Jamir Mac MD;  Location: [facility]     PICC DOUBLE LUMEN PLACEMENT Right 12/01/2021    5FR DL PICC, basilic vein. L-38cm, 1cm out.     PICC DOUBLE LUMEN PLACEMENT Right 01/04/2022    Right cephalic, 41 cm, 1 external length     PITUITARY EXCISION       tooth pulled 4/7  Right      Allergies   Allergen Reactions     Penicillins Hives and Swelling            Social History   Social History     Tobacco Use     Smoking  status: Never Smoker     Smokeless tobacco: Never Used   Substance Use Topics     Alcohol use: Yes     Comment: rare     Drug use: Never      Past medical history and social history were reviewed.    Physical Examination:  There were no vitals taken for this visit.  Wt Readings from Last 10 Encounters:   04/26/22 64 kg (141 lb 3.2 oz)   04/07/22 64.7 kg (142 lb 9.6 oz)   03/25/22 66.2 kg (146 lb)   03/10/22 66.2 kg (146 lb)   02/08/22 63 kg (138 lb 14.4 oz)   01/28/22 65.8 kg (145 lb)   01/11/22 65.1 kg (143 lb 8 oz)   12/09/21 66.7 kg (147 lb)   12/09/21 67 kg (147 lb 11.2 oz)   12/08/21 64.8 kg (142 lb 13.7 oz)     Laboratory Data:   Latest Reference Range & Units 05/16/22 13:45   Sodium 133 - 144 mmol/L 142   Potassium 3.4 - 5.3 mmol/L 3.8   Chloride 94 - 109 mmol/L 108   Carbon Dioxide 20 - 32 mmol/L 28   Urea Nitrogen 7 - 30 mg/dL 17   Creatinine 0.66 - 1.25 mg/dL 1.49 (H)   GFR Estimate >60 mL/min/1.73m2 49 (L) [1]   Calcium 8.5 - 10.1 mg/dL 9.4   Anion Gap 3 - 14 mmol/L 6   Magnesium 1.6 - 2.3 mg/dL 2.5 (H)   Phosphorus 2.5 - 4.5 mg/dL 2.7   Albumin 3.4 - 5.0 g/dL 3.3 (L)   Protein Total 6.8 - 8.8 g/dL 7.1   Bilirubin Total 0.2 - 1.3 mg/dL 0.3   Alkaline Phosphatase 40 - 150 U/L 138   ALT 0 - 70 U/L 18   AST 0 - 45 U/L 19   Glucose 70 - 99 mg/dL 100 (H)   WBC 4.0 - 11.0 10e3/uL 8.5   Hemoglobin 13.3 - 17.7 g/dL 10.1 (L)   Hematocrit 40.0 - 53.0 % 32.1 (L)   Platelet Count 150 - 450 10e3/uL 205   RBC Count 4.40 - 5.90 10e6/uL 3.28 (L)   MCV 78 - 100 fL 98   MCH 26.5 - 33.0 pg 30.8   MCHC 31.5 - 36.5 g/dL 31.5   RDW 10.0 - 15.0 % 14.4   % Neutrophils % 69   % Lymphocytes % 18   % Monocytes % 9   % Eosinophils % 2   % Basophils % 1   Absolute Basophils 0.0 - 0.2 10e3/uL 0.1   Absolute Eosinophils 0.0 - 0.7 10e3/uL 0.1   Absolute Immature Granulocytes <=0.4 10e3/uL 0.0   Absolute Lymphocytes 0.8 - 5.3 10e3/uL 1.5   Absolute Monocytes 0.0 - 1.3 10e3/uL 0.8   % Immature Granulocytes % 1   Absolute Neutrophils 1.6 -  8.3 10e3/uL 6.0   Absolute NRBCs 10e3/uL 0.0   NRBCs per 100 WBC <1 /100 0   (H): Data is abnormally high  (L): Data is abnormally low  [1] Effective December 21, 2021 eGFRcr in adults is calculated using the 2021 CKD-EPI creatinine equation which includes age and gender (Chiki mackay al., [facility], DOI: 10.1056/GDNOou0568470)    Echo 5/16/22  Interpretation Summary     Left ventricular size, wall motion and function are normal. The ejection  fraction is 60-65%.  Normal right ventricle size and systolic function.  IVC diameter <2.1 cm collapsing >50% with sniff suggests a normal RA pressure  of 3 mmHg.  No hemodynamically significant valvular abnormalities on 2D or color flow  imaging.    Assessment and Plan:  1. 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 High PD-L1. Was on Keytruda but had progression, now on Doxil + Ifos started 10/26/21. Port placed 1/28/22.     He completed 4 cycles of Doxil + Ifos, has had multiple tolerance issues including nausea/vomiting, dehydration, neurotoxicity, mucositis, skin toxicity, pancytopenia, hypokalemia, hypophosphatemia. CT with positive response.      Now on Doxil alone- continues to have tolerance issues with signficant mucositis/thrush, skin sores, weakness, dehydration, HUSSEIN.     CT CAP with stable disease. Echo stable.      Discussed with Dr. Wolff patient's continued significant mucositis issues. Will cancel this months treatment all together to give mouth time to heal with hopes we can restart Doxil at lower dose in June. He will still get labs and IVF twice weekly locally as scheduled.      2. GI  Dyspepsia: Continue Omeprazole 40mg daily. Continue Tums PRN     CINV:  Zofran PRN.      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 Cal Saba endocrine.     Hypothyroidism continue Synthroid 75mcg daily.       4. Derm  Rosacea: Long standing issue, continue Metrogel PRN     Hand/foot: 2/2 Doxil, icing hands  and feet. Continue emollients at home. Kenalog cream for bothersome/blistering and red areas. Canceling this months treatment as above.      5. MSK  Left shoulder/back/chest wall pain 2/2 tumor, following with palliative. No pain currently, off all pain medications.       6. ENT  Hoarseness: Thought 2/2 mediastinal mass vs irritation from prior biopsy. Following with ENT, s/p vocal cord injection 7/1/21. Following with voice clinic.      Mucositis/Thrush: Continues to be significant. Minimal improvement with high dose fluconazole. Once completed can stay off fluconazole and monitor for improvement with holding Doxil. Can do healios and salt/soda PRN. Has ENT visit for refractory thrush scheduled in June.      7. Pulm/Cards  Working with speech on laryngreal dysfunction.     SOB getting worse as is hoarseness. CT stable. Better today. Echo stable.      Continue Guaifenesin-Coedine PRN for cough, much improved.       Off statin due to LFT elevation, improved.      8. FEN  Hypokalemia: Recurrent issue, continue 20meq daily. He feels better on supplement.      Hypophosphatemia: Off supplement, occasionally a bit low but overall better. Monitor.     Dehydration with HUSSEIN: Will get labs and IVF today. Canceling treatment as above. Will continue twice weekly labs and fluids     9. Psych/Neuro  Anxiety/depression: Improved, on Lexapro and feels like it is helping. Continue.      Continue PRN Imitrex for migraines.      10. Heme  Pancytopenia 2/2 chemotherapy. Much improved on Doxil alone.    15 minutes spent on the date of the encounter doing chart review, review of test results, interpretation of tests, patient visit and documentation     Maynor Rios PA-C  Department of Hematology and Oncology  [facility]

## 2022-05-16 NOTE — LETTER
5/16/2022         RE: Ifrah Huitron  [address] [address] [facility] [address]        Dear Colleague,    Thank you for referring your patient, Ifrah Huitron, to the [facility]. Please see a copy of my visit note below.    Oncology/Hematology Visit Note  May 16, 2022    Reason for Visit: Follow up of spindle cell sarcoma     History of Present Illness: Ifrah Huitron is a 71 year old male with PMH rosacea, pituitary macroadenoma s/p resection, GERD, kidney stones, DJD with metastatic spindle cell sarcoma. He presented to his PCP March 2021 with chest pain/left shoulder and back pain that led to imaging which revealed multiple lung mets. There were difficulties in getting diagnostic biopsy, eventually biopsy of mediastinal mass with spindle cell sarcoma. There was high PD-L1 expression (90%). Baseline imaging 6/21/21 with progression of lung mets and left-sided subdiaphragmatic mass, stable liver lesions. 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      He started Keytruda 6/21/21. CT 8/2/21 with positive response to treatment. CT 10/18/21 with mixed response- LUQ mass larger, anterior mediastinal and left anterior pleural mass smaller. Keytruda stopped.     Started on Doxil + Ifosfamide 10/26/21. Poorly tolerated with mucositis, nausea, poor oral intake. CT with stable to positive response.      Received C2 12/1/21 (delayed for tolerance issues) with 10% dose reduction.     Received C3 1/4/22 with same 10% dose reduction. He had issues with nausea inpatient along with recurrent thrush.      Received C4 2/2/22 with same 10% dose reduction. Had more significant neurotoxicity so only received 5.5 days. Symptoms resolved after stopping Ifosfamide.      CT 3/8/22 with positive response to treatment.      He is now on Doxil alone but has been dealing with worsening mouth and skin toxicity.     Interval  "History:  Ifrah was called today for follow-up. His mouth is only slightly better- still poor taste, coating on back of tongue. Not as painful. He feels like the higher dose of fluconazole hasn't done much. His skin is improved. He still gets mild WALKER but overall improved. He has lost weight and is weak. He will still go get fluids today. No swelling issues. No fevers. Minimal nausea though takes Zofran occasionally.    Current Outpatient Medications   Medication Sig Dispense Refill     cholecalciferol 25 MCG (1000 UT) TABS Take 1,000 Units by mouth daily        escitalopram (LEXAPRO) 10 MG tablet Take 1 tablet (10 mg) by mouth daily 30 tablet 3     fluconazole (DIFLUCAN) 200 MG tablet Take 2 tablets (400 mg) by mouth daily for 7 days . Then go back to the 100mg dose. 14 tablet 0     guaiFENesin-codeine (GUAIFENESIN AC) 100-10 MG/5ML syrup Take 10 mLs by mouth every 4 hours as needed for cough (Patient taking differently: Take 10 mLs by mouth every 4 hours as needed for cough prn) 118 mL 0     hydrocortisone (CORTEF) 10 MG tablet Take 20 mg in the morning and 10 mg in the afternoon 270 tablet 3     Insulin Syringe-Needle U-100 27.5G X 5/8\" 2 ML MISC 1 each daily as needed (with solucortef for adrenal crisis) 10 each 1     levothyroxine (SYNTHROID/LEVOTHROID) 75 MCG tablet Take 1 tablet (75 mcg) by mouth every morning 90 tablet 3     Menthol-Methyl Salicylate (DALIA MALIK GREASELESS) cream Apply topically 2 times daily       metroNIDAZOLE (METROGEL) 1 % external gel Apply topically daily .Try stronger dose due to increased symptoms after chemo. 30 g 0     omeprazole (PRILOSEC) 20 MG DR capsule Take 2 capsules (40 mg) by mouth daily 60 capsule 1     ondansetron (ZOFRAN) 4 MG tablet Take 1-2 tablets (4-8 mg) by mouth every 8 hours as needed for nausea 60 tablet 3     potassium chloride ER (KLOR-CON M) 20 MEQ CR tablet Take 1 tablet (20 mEq) by mouth daily 30 tablet 1     prochlorperazine (COMPAZINE) 5 MG tablet Take 1 tablet " (5 mg) by mouth every 6 hours as needed for nausea or vomiting . Caution: causes sedation. (Patient taking differently: Take 5 mg by mouth every 6 hours as needed for nausea or vomiting . Caution: causes sedation. PRN) 30 tablet 1     SUMAtriptan (IMITREX) 50 MG tablet Take 1 tablet (50 mg) by mouth at onset of headache for migraine May repeat in 2 hours. Max 4 tablets/24 hours. (Patient taking differently: Take 50 mg by mouth at onset of headache for migraine May repeat in 2 hours. Max 4 tablets/24 hours. PRN) 10 tablet 3     triamcinolone (KENALOG) 0.1 % external cream Apply topically 2 times daily to bothersome skin areas. 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     Thyroid disease     removed pituitary gland     Past Surgical History:   Procedure Laterality Date     COLONOSCOPY N/A 12/17/2020    Procedure: COLONOSCOPY;  Surgeon: Ken Camacho MD;  Location: [facility]     ENT SURGERY       HERNIA REPAIR       INSERT PORT VASCULAR ACCESS Right 1/28/2022    Procedure: INSERTION, VASCULAR ACCESS PORT;  Surgeon: Daniel Kinney MD;  Location: [facility] OR     IR CHEST PORT PLACEMENT > 5 YRS OF AGE  1/28/2022     LARYNGOSCOPY, EXCISE VOCAL CORD LESION MICROSCOPIC, COMBINED Left 07/01/2021    Procedure: MICROLARYNGOSCOPY, LEFT TRUE VOCAL CORD INJECTION WITH PROLARYN;  Surgeon: Kyree Bearden MD;  Location: [facility]     PHACOEMULSIFICATION WITH STANDARD INTRAOCULAR LENS IMPLANT Right 03/10/2021    Procedure: Cataract removal with implant.;  Surgeon: Jamir Mac MD;  Location: [facility]     PHACOEMULSIFICATION WITH STANDARD INTRAOCULAR LENS IMPLANT Left 04/05/2021    Procedure: Cataract removal with implant.;  Surgeon: Jamir Mac MD;  Location: [facility]     PICC DOUBLE LUMEN PLACEMENT Right 12/01/2021    5FR DL PICC, basilic vein. L-38cm, 1cm out.     PICC DOUBLE LUMEN PLACEMENT  Right 01/04/2022    Right cephalic, 41 cm, 1 external length     PITUITARY EXCISION       tooth pulled 4/7  Right      Allergies   Allergen Reactions     Penicillins Hives and Swelling            Social History   Social History     Tobacco Use     Smoking status: Never Smoker     Smokeless tobacco: Never Used   Substance Use Topics     Alcohol use: Yes     Comment: rare     Drug use: Never      Past medical history and social history were reviewed.    Physical Examination:  There were no vitals taken for this visit.  Wt Readings from Last 10 Encounters:   04/26/22 64 kg (141 lb 3.2 oz)   04/07/22 64.7 kg (142 lb 9.6 oz)   03/25/22 66.2 kg (146 lb)   03/10/22 66.2 kg (146 lb)   02/08/22 63 kg (138 lb 14.4 oz)   01/28/22 65.8 kg (145 lb)   01/11/22 65.1 kg (143 lb 8 oz)   12/09/21 66.7 kg (147 lb)   12/09/21 67 kg (147 lb 11.2 oz)   12/08/21 64.8 kg (142 lb 13.7 oz)     Laboratory Data:   Latest Reference Range & Units 05/16/22 13:45   Sodium 133 - 144 mmol/L 142   Potassium 3.4 - 5.3 mmol/L 3.8   Chloride 94 - 109 mmol/L 108   Carbon Dioxide 20 - 32 mmol/L 28   Urea Nitrogen 7 - 30 mg/dL 17   Creatinine 0.66 - 1.25 mg/dL 1.49 (H)   GFR Estimate >60 mL/min/1.73m2 49 (L) [1]   Calcium 8.5 - 10.1 mg/dL 9.4   Anion Gap 3 - 14 mmol/L 6   Magnesium 1.6 - 2.3 mg/dL 2.5 (H)   Phosphorus 2.5 - 4.5 mg/dL 2.7   Albumin 3.4 - 5.0 g/dL 3.3 (L)   Protein Total 6.8 - 8.8 g/dL 7.1   Bilirubin Total 0.2 - 1.3 mg/dL 0.3   Alkaline Phosphatase 40 - 150 U/L 138   ALT 0 - 70 U/L 18   AST 0 - 45 U/L 19   Glucose 70 - 99 mg/dL 100 (H)   WBC 4.0 - 11.0 10e3/uL 8.5   Hemoglobin 13.3 - 17.7 g/dL 10.1 (L)   Hematocrit 40.0 - 53.0 % 32.1 (L)   Platelet Count 150 - 450 10e3/uL 205   RBC Count 4.40 - 5.90 10e6/uL 3.28 (L)   MCV 78 - 100 fL 98   MCH 26.5 - 33.0 pg 30.8   MCHC 31.5 - 36.5 g/dL 31.5   RDW 10.0 - 15.0 % 14.4   % Neutrophils % 69   % Lymphocytes % 18   % Monocytes % 9   % Eosinophils % 2   % Basophils % 1   Absolute Basophils 0.0 -  0.2 10e3/uL 0.1   Absolute Eosinophils 0.0 - 0.7 10e3/uL 0.1   Absolute Immature Granulocytes <=0.4 10e3/uL 0.0   Absolute Lymphocytes 0.8 - 5.3 10e3/uL 1.5   Absolute Monocytes 0.0 - 1.3 10e3/uL 0.8   % Immature Granulocytes % 1   Absolute Neutrophils 1.6 - 8.3 10e3/uL 6.0   Absolute NRBCs 10e3/uL 0.0   NRBCs per 100 WBC <1 /100 0   (H): Data is abnormally high  (L): Data is abnormally low  [1] Effective December 21, 2021 eGFRcr in adults is calculated using the 2021 CKD-EPI creatinine equation which includes age and gender (Chiki et al., [place], DOI: 10.1056/YVZGlw7683624)    Echo 5/16/22  Interpretation Summary     Left ventricular size, wall motion and function are normal. The ejection  fraction is 60-65%.  Normal right ventricle size and systolic function.  IVC diameter <2.1 cm collapsing >50% with sniff suggests a normal RA pressure  of 3 mmHg.  No hemodynamically significant valvular abnormalities on 2D or color flow  imaging.    Assessment and Plan:  1. 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 High PD-L1. Was on Keytruda but had progression, now on Doxil + Ifos started 10/26/21. Port placed 1/28/22.     He completed 4 cycles of Doxil + Ifos, has had multiple tolerance issues including nausea/vomiting, dehydration, neurotoxicity, mucositis, skin toxicity, pancytopenia, hypokalemia, hypophosphatemia. CT with positive response.      Now on Doxil alone- continues to have tolerance issues with signficant mucositis/thrush, skin sores, weakness, dehydration, HUSSEIN.     CT CAP with stable disease. Echo stable.      Discussed with Dr. Wolff patient's continued significant mucositis issues. Will cancel this months treatment all together to give mouth time to heal with hopes we can restart Doxil at lower dose in June. He will still get labs and IVF twice weekly locally as scheduled.      2. GI  Dyspepsia: Continue Omeprazole 40mg daily. Continue Tums PRN     CINV:  Zofran  PRN.      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 Cal Saba endocrine.     Hypothyroidism continue Synthroid 75mcg daily.       4. Derm  Rosacea: Long standing issue, continue Metrogel PRN     Hand/foot: 2/2 Doxil, icing hands and feet. Continue emollients at home. Kenalog cream for bothersome/blistering and red areas. Canceling this months treatment as above.      5. MSK  Left shoulder/back/chest wall pain 2/2 tumor, following with palliative. No pain currently, off all pain medications.       6. ENT  Hoarseness: Thought 2/2 mediastinal mass vs irritation from prior biopsy. Following with ENT, s/p vocal cord injection 7/1/21. Following with voice clinic.      Mucositis/Thrush: Continues to be significant. Minimal improvement with high dose fluconazole. Once completed can stay off fluconazole and monitor for improvement with holding Doxil. Can do healios and salt/soda PRN. Has ENT visit for refractory thrush scheduled in June.      7. Pulm/Cards  Working with speech on laryngreal dysfunction.     SOB getting worse as is hoarseness. CT stable. Better today. Echo stable.      Continue Guaifenesin-Coedine PRN for cough, much improved.       Off statin due to LFT elevation, improved.      8. FEN  Hypokalemia: Recurrent issue, continue 20meq daily. He feels better on supplement.      Hypophosphatemia: Off supplement, occasionally a bit low but overall better. Monitor.     Dehydration with HUSSEIN: Will get labs and IVF today. Canceling treatment as above. Will continue twice weekly labs and fluids     9. Psych/Neuro  Anxiety/depression: Improved, on Lexapro and feels like it is helping. Continue.      Continue PRN Imitrex for migraines.      10. Heme  Pancytopenia 2/2 chemotherapy. Much improved on Doxil alone.    15 minutes spent on the date of the encounter doing chart review, review of test results, interpretation of tests, patient visit and documentation     Maynor Rios  SHARON  Department of Hematology and Oncology  [facility]       Ifrah is a 71 year old who is being evaluated via a billable video visit.      How would you like to obtain your AVS? MyChart  If the video visit is dropped, the invitation should be resent by: Text to cell phone: [phone]  Will anyone else be joining your video vi sit?       Telephone visit 10 minutes       Again, thank you for allowing me to participate in the care of your patient.        Sincerely,        GERALDO Mullins

## 2022-05-16 NOTE — LETTER
5/16/2022         RE: Ifrah Huitron  [id number] [facility] [address]        Dear Colleague,    Thank you for referring your patient, Ifrah Huitron, to the [facility]. Please see a copy of my visit note below.    Oncology/Hematology Visit Note  May 16, 2022    Reason for Visit: Follow up of spindle cell sarcoma     History of Present Illness: Ifrah Huitron is a 71 year old male with PMH rosacea, pituitary macroadenoma s/p resection, GERD, kidney stones, DJD with metastatic spindle cell sarcoma. He presented to his PCP March 2021 with chest pain/left shoulder and back pain that led to imaging which revealed multiple lung mets. There were difficulties in getting diagnostic biopsy, eventually biopsy of mediastinal mass with spindle cell sarcoma. There was high PD-L1 expression (90%). Baseline imaging 6/21/21 with progression of lung mets and left-sided subdiaphragmatic mass, stable liver lesions. 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      He started Keytruda 6/21/21. CT 8/2/21 with positive response to treatment. CT 10/18/21 with mixed response- LUQ mass larger, anterior mediastinal and left anterior pleural mass smaller. Keytruda stopped.     Started on Doxil + Ifosfamide 10/26/21. Poorly tolerated with mucositis, nausea, poor oral intake. CT with stable to positive response.      Received C2 12/1/21 (delayed for tolerance issues) with 10% dose reduction.     Received C3 1/4/22 with same 10% dose reduction. He had issues with nausea inpatient along with recurrent thrush.      Received C4 2/2/22 with same 10% dose reduction. Had more significant neurotoxicity so only received 5.5 days. Symptoms resolved after stopping Ifosfamide.      CT 3/8/22 with positive response to treatment.      He is now on Doxil alone but has been dealing with worsening mouth and skin toxicity.     Interval  "History:  Ifrah was called today for follow-up. His mouth is only slightly better- still poor taste, coating on back of tongue. Not as painful. He feels like the higher dose of fluconazole hasn't done much. His skin is improved. He still gets mild WALKER but overall improved. He has lost weight and is weak. He will still go get fluids today. No swelling issues. No fevers. Minimal nausea though takes Zofran occasionally.    Current Outpatient Medications   Medication Sig Dispense Refill     cholecalciferol 25 MCG (1000 UT) TABS Take 1,000 Units by mouth daily        escitalopram (LEXAPRO) 10 MG tablet Take 1 tablet (10 mg) by mouth daily 30 tablet 3     fluconazole (DIFLUCAN) 200 MG tablet Take 2 tablets (400 mg) by mouth daily for 7 days . Then go back to the 100mg dose. 14 tablet 0     guaiFENesin-codeine (GUAIFENESIN AC) 100-10 MG/5ML syrup Take 10 mLs by mouth every 4 hours as needed for cough (Patient taking differently: Take 10 mLs by mouth every 4 hours as needed for cough prn) 118 mL 0     hydrocortisone (CORTEF) 10 MG tablet Take 20 mg in the morning and 10 mg in the afternoon 270 tablet 3     Insulin Syringe-Needle U-100 27.5G X 5/8\" 2 ML MISC 1 each daily as needed (with solucortef for adrenal crisis) 10 each 1     levothyroxine (SYNTHROID/LEVOTHROID) 75 MCG tablet Take 1 tablet (75 mcg) by mouth every morning 90 tablet 3     Menthol-Methyl Salicylate (DALIA MALIK GREASELESS) cream Apply topically 2 times daily       metroNIDAZOLE (METROGEL) 1 % external gel Apply topically daily .Try stronger dose due to increased symptoms after chemo. 30 g 0     omeprazole (PRILOSEC) 20 MG DR capsule Take 2 capsules (40 mg) by mouth daily 60 capsule 1     ondansetron (ZOFRAN) 4 MG tablet Take 1-2 tablets (4-8 mg) by mouth every 8 hours as needed for nausea 60 tablet 3     potassium chloride ER (KLOR-CON M) 20 MEQ CR tablet Take 1 tablet (20 mEq) by mouth daily 30 tablet 1     prochlorperazine (COMPAZINE) 5 MG tablet Take 1 tablet " (5 mg) by mouth every 6 hours as needed for nausea or vomiting . Caution: causes sedation. (Patient taking differently: Take 5 mg by mouth every 6 hours as needed for nausea or vomiting . Caution: causes sedation. PRN) 30 tablet 1     SUMAtriptan (IMITREX) 50 MG tablet Take 1 tablet (50 mg) by mouth at onset of headache for migraine May repeat in 2 hours. Max 4 tablets/24 hours. (Patient taking differently: Take 50 mg by mouth at onset of headache for migraine May repeat in 2 hours. Max 4 tablets/24 hours. PRN) 10 tablet 3     triamcinolone (KENALOG) 0.1 % external cream Apply topically 2 times daily to bothersome skin areas. 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 [place]     IR CHEST PORT PLACEMENT > 5 YRS OF AGE  1/28/2022     LARYNGOSCOPY, EXCISE VOCAL CORD LESION MICROSCOPIC, COMBINED Left 07/01/2021    Procedure: MICROLARYNGOSCOPY, LEFT TRUE VOCAL CORD INJECTION WITH PROLARYN;  Surgeon: Kyree Bearden MD;  Location: [facility]     PHACOEMULSIFICATION WITH STANDARD INTRAOCULAR LENS IMPLANT Right 03/10/2021    Procedure: Cataract removal with implant.;  Surgeon: Jamir Mac MD;  Location: [facility]     PHACOEMULSIFICATION WITH STANDARD INTRAOCULAR LENS IMPLANT Left 04/05/2021    Procedure: Cataract removal with implant.;  Surgeon: Jamir Mac MD;  Location: [facility]     PICC DOUBLE LUMEN PLACEMENT Right 12/01/2021    5FR DL PICC, basilic vein. L-38cm, 1cm out.     PICC DOUBLE LUMEN PLACEMENT  Right 01/04/2022    Right cephalic, 41 cm, 1 external length     PITUITARY EXCISION       tooth pulled 4/7  Right      Allergies   Allergen Reactions     Penicillins Hives and Swelling            Social History   Social History     Tobacco Use     Smoking status: Never Smoker     Smokeless tobacco: Never Used   Substance Use Topics     Alcohol use: Yes     Comment: rare     Drug use: Never      Past medical history and social history were reviewed.    Physical Examination:  There were no vitals taken for this visit.  Wt Readings from Last 10 Encounters:   04/26/22 64 kg (141 lb 3.2 oz)   04/07/22 64.7 kg (142 lb 9.6 oz)   03/25/22 66.2 kg (146 lb)   03/10/22 66.2 kg (146 lb)   02/08/22 63 kg (138 lb 14.4 oz)   01/28/22 65.8 kg (145 lb)   01/11/22 65.1 kg (143 lb 8 oz)   12/09/21 66.7 kg (147 lb)   12/09/21 67 kg (147 lb 11.2 oz)   12/08/21 64.8 kg (142 lb 13.7 oz)     Laboratory Data:   Latest Reference Range & Units 05/16/22 13:45   Sodium 133 - 144 mmol/L 142   Potassium 3.4 - 5.3 mmol/L 3.8   Chloride 94 - 109 mmol/L 108   Carbon Dioxide 20 - 32 mmol/L 28   Urea Nitrogen 7 - 30 mg/dL 17   Creatinine 0.66 - 1.25 mg/dL 1.49 (H)   GFR Estimate >60 mL/min/1.73m2 49 (L) [1]   Calcium 8.5 - 10.1 mg/dL 9.4   Anion Gap 3 - 14 mmol/L 6   Magnesium 1.6 - 2.3 mg/dL 2.5 (H)   Phosphorus 2.5 - 4.5 mg/dL 2.7   Albumin 3.4 - 5.0 g/dL 3.3 (L)   Protein Total 6.8 - 8.8 g/dL 7.1   Bilirubin Total 0.2 - 1.3 mg/dL 0.3   Alkaline Phosphatase 40 - 150 U/L 138   ALT 0 - 70 U/L 18   AST 0 - 45 U/L 19   Glucose 70 - 99 mg/dL 100 (H)   WBC 4.0 - 11.0 10e3/uL 8.5   Hemoglobin 13.3 - 17.7 g/dL 10.1 (L)   Hematocrit 40.0 - 53.0 % 32.1 (L)   Platelet Count 150 - 450 10e3/uL 205   RBC Count 4.40 - 5.90 10e6/uL 3.28 (L)   MCV 78 - 100 fL 98   MCH 26.5 - 33.0 pg 30.8   MCHC 31.5 - 36.5 g/dL 31.5   RDW 10.0 - 15.0 % 14.4   % Neutrophils % 69   % Lymphocytes % 18   % Monocytes % 9   % Eosinophils % 2   % Basophils % 1   Absolute Basophils 0.0 -  0.2 10e3/uL 0.1   Absolute Eosinophils 0.0 - 0.7 10e3/uL 0.1   Absolute Immature Granulocytes <=0.4 10e3/uL 0.0   Absolute Lymphocytes 0.8 - 5.3 10e3/uL 1.5   Absolute Monocytes 0.0 - 1.3 10e3/uL 0.8   % Immature Granulocytes % 1   Absolute Neutrophils 1.6 - 8.3 10e3/uL 6.0   Absolute NRBCs 10e3/uL 0.0   NRBCs per 100 WBC <1 /100 0   (H): Data is abnormally high  (L): Data is abnormally low  [1] Effective December 21, 2021 eGFRcr in adults is calculated using the 2021 CKD-EPI creatinine equation which includes age and gender (Chiki et al., [place], DOI: 10.1056/GSDJyx2846417)    Echo 5/16/22  Interpretation Summary     Left ventricular size, wall motion and function are normal. The ejection  fraction is 60-65%.  Normal right ventricle size and systolic function.  IVC diameter <2.1 cm collapsing >50% with sniff suggests a normal RA pressure  of 3 mmHg.  No hemodynamically significant valvular abnormalities on 2D or color flow  imaging.    Assessment and Plan:  1. 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 High PD-L1. Was on Keytruda but had progression, now on Doxil + Ifos started 10/26/21. Port placed 1/28/22.     He completed 4 cycles of Doxil + Ifos, has had multiple tolerance issues including nausea/vomiting, dehydration, neurotoxicity, mucositis, skin toxicity, pancytopenia, hypokalemia, hypophosphatemia. CT with positive response.      Now on Doxil alone- continues to have tolerance issues with signficant mucositis/thrush, skin sores, weakness, dehydration, HUSSEIN.     CT CAP with stable disease. Echo stable.      Discussed with Dr. Wolff patient's continued significant mucositis issues. Will cancel this months treatment all together to give mouth time to heal with hopes we can restart Doxil at lower dose in June. He will still get labs and IVF twice weekly locally as scheduled.      2. GI  Dyspepsia: Continue Omeprazole 40mg daily. Continue Tums PRN     CINV:  Zofran  PRN.      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 Cal Saba endocrine.     Hypothyroidism continue Synthroid 75mcg daily.       4. Derm  Rosacea: Long standing issue, continue Metrogel PRN     Hand/foot: 2/2 Doxil, icing hands and feet. Continue emollients at home. Kenalog cream for bothersome/blistering and red areas. Canceling this months treatment as above.      5. MSK  Left shoulder/back/chest wall pain 2/2 tumor, following with palliative. No pain currently, off all pain medications.       6. ENT  Hoarseness: Thought 2/2 mediastinal mass vs irritation from prior biopsy. Following with ENT, s/p vocal cord injection 7/1/21. Following with voice clinic.      Mucositis/Thrush: Continues to be significant. Minimal improvement with high dose fluconazole. Once completed can stay off fluconazole and monitor for improvement with holding Doxil. Can do healios and salt/soda PRN. Has ENT visit for refractory thrush scheduled in June.      7. Pulm/Cards  Working with speech on laryngreal dysfunction.     SOB getting worse as is hoarseness. CT stable. Better today. Echo stable.      Continue Guaifenesin-Coedine PRN for cough, much improved.       Off statin due to LFT elevation, improved.      8. FEN  Hypokalemia: Recurrent issue, continue 20meq daily. He feels better on supplement.      Hypophosphatemia: Off supplement, occasionally a bit low but overall better. Monitor.     Dehydration with HUSSEIN: Will get labs and IVF today. Canceling treatment as above. Will continue twice weekly labs and fluids     9. Psych/Neuro  Anxiety/depression: Improved, on Lexapro and feels like it is helping. Continue.      Continue PRN Imitrex for migraines.      10. Heme  Pancytopenia 2/2 chemotherapy. Much improved on Doxil alone.    15 minutes spent on the date of the encounter doing chart review, review of test results, interpretation of tests, patient visit and documentation     Maynor Rios  SHARON  Department of Hematology and Oncology  [facility]       Ifrah is a 71 year old who is being evaluated via a billable video visit.      How would you like to obtain your AVS? MyChart  If the video visit is dropped, the invitation should be resent by: Text to cell phone: [phone]  Will anyone else be joining your video vi sit?       Telephone visit 10 minutes       Again, thank you for allowing me to participate in the care of your patient.        Sincerely,        GERALDO Mullins

## 2022-05-19 ENCOUNTER — LAB (OUTPATIENT)
Dept: INFUSION THERAPY | Facility: CLINIC | Age: 72
End: 2022-05-19
Attending: FAMILY MEDICINE
Payer: MEDICARE

## 2022-05-19 VITALS — DIASTOLIC BLOOD PRESSURE: 85 MMHG | TEMPERATURE: 98.3 F | HEART RATE: 63 BPM | SYSTOLIC BLOOD PRESSURE: 136 MMHG

## 2022-05-19 DIAGNOSIS — T45.1X5A CHEMOTHERAPY-INDUCED NAUSEA: ICD-10-CM

## 2022-05-19 DIAGNOSIS — C49.9 SPINDLE CELL SARCOMA (H): Primary | ICD-10-CM

## 2022-05-19 DIAGNOSIS — C49.9 SARCOMA (H): ICD-10-CM

## 2022-05-19 DIAGNOSIS — R11.0 CHEMOTHERAPY-INDUCED NAUSEA: ICD-10-CM

## 2022-05-19 LAB
ALBUMIN SERPL-MCNC: 3.4 G/DL (ref 3.4–5)
ALP SERPL-CCNC: 133 U/L (ref 40–150)
ALT SERPL W P-5'-P-CCNC: 15 U/L (ref 0–70)
ANION GAP SERPL CALCULATED.3IONS-SCNC: 6 MMOL/L (ref 3–14)
AST SERPL W P-5'-P-CCNC: 14 U/L (ref 0–45)
BASOPHILS # BLD AUTO: 0 10E3/UL (ref 0–0.2)
BASOPHILS NFR BLD AUTO: 1 %
BILIRUB SERPL-MCNC: 0.3 MG/DL (ref 0.2–1.3)
BUN SERPL-MCNC: 21 MG/DL (ref 7–30)
CALCIUM SERPL-MCNC: 9.3 MG/DL (ref 8.5–10.1)
CHLORIDE BLD-SCNC: 108 MMOL/L (ref 94–109)
CO2 SERPL-SCNC: 28 MMOL/L (ref 20–32)
CREAT SERPL-MCNC: 1.48 MG/DL (ref 0.66–1.25)
EOSINOPHIL # BLD AUTO: 0.1 10E3/UL (ref 0–0.7)
EOSINOPHIL NFR BLD AUTO: 2 %
ERYTHROCYTE [DISTWIDTH] IN BLOOD BY AUTOMATED COUNT: 14.3 % (ref 10–15)
GFR SERPL CREATININE-BSD FRML MDRD: 50 ML/MIN/1.73M2
GLUCOSE BLD-MCNC: 113 MG/DL (ref 70–99)
HCT VFR BLD AUTO: 33.2 % (ref 40–53)
HGB BLD-MCNC: 10.4 G/DL (ref 13.3–17.7)
IMM GRANULOCYTES # BLD: 0 10E3/UL
IMM GRANULOCYTES NFR BLD: 0 %
LYMPHOCYTES # BLD AUTO: 1.5 10E3/UL (ref 0.8–5.3)
LYMPHOCYTES NFR BLD AUTO: 18 %
MAGNESIUM SERPL-MCNC: 2.3 MG/DL (ref 1.6–2.3)
MCH RBC QN AUTO: 30.7 PG (ref 26.5–33)
MCHC RBC AUTO-ENTMCNC: 31.3 G/DL (ref 31.5–36.5)
MCV RBC AUTO: 98 FL (ref 78–100)
MONOCYTES # BLD AUTO: 0.7 10E3/UL (ref 0–1.3)
MONOCYTES NFR BLD AUTO: 9 %
NEUTROPHILS # BLD AUTO: 5.9 10E3/UL (ref 1.6–8.3)
NEUTROPHILS NFR BLD AUTO: 70 %
NRBC # BLD AUTO: 0 10E3/UL
NRBC BLD AUTO-RTO: 0 /100
PHOSPHATE SERPL-MCNC: 2.5 MG/DL (ref 2.5–4.5)
PLATELET # BLD AUTO: 199 10E3/UL (ref 150–450)
POTASSIUM BLD-SCNC: 3.5 MMOL/L (ref 3.4–5.3)
PROT SERPL-MCNC: 6.9 G/DL (ref 6.8–8.8)
RBC # BLD AUTO: 3.39 10E6/UL (ref 4.4–5.9)
SODIUM SERPL-SCNC: 142 MMOL/L (ref 133–144)
WBC # BLD AUTO: 8.4 10E3/UL (ref 4–11)

## 2022-05-19 PROCEDURE — 96360 HYDRATION IV INFUSION INIT: CPT

## 2022-05-19 PROCEDURE — 85025 COMPLETE CBC W/AUTO DIFF WBC: CPT | Performed by: PHYSICIAN ASSISTANT

## 2022-05-19 PROCEDURE — 80053 COMPREHEN METABOLIC PANEL: CPT | Performed by: PHYSICIAN ASSISTANT

## 2022-05-19 PROCEDURE — 250N000011 HC RX IP 250 OP 636: Performed by: PHYSICIAN ASSISTANT

## 2022-05-19 PROCEDURE — 36591 DRAW BLOOD OFF VENOUS DEVICE: CPT

## 2022-05-19 PROCEDURE — 258N000003 HC RX IP 258 OP 636: Performed by: PHYSICIAN ASSISTANT

## 2022-05-19 PROCEDURE — 84100 ASSAY OF PHOSPHORUS: CPT | Performed by: PHYSICIAN ASSISTANT

## 2022-05-19 PROCEDURE — 83735 ASSAY OF MAGNESIUM: CPT | Performed by: PHYSICIAN ASSISTANT

## 2022-05-19 RX ORDER — HEPARIN SODIUM (PORCINE) LOCK FLUSH IV SOLN 100 UNIT/ML 100 UNIT/ML
5 SOLUTION INTRAVENOUS
Status: COMPLETED | OUTPATIENT
Start: 2022-05-19 | End: 2022-05-19

## 2022-05-19 RX ORDER — HEPARIN SODIUM (PORCINE) LOCK FLUSH IV SOLN 100 UNIT/ML 100 UNIT/ML
5 SOLUTION INTRAVENOUS
Status: CANCELLED
Start: 2022-05-19

## 2022-05-19 RX ADMIN — SODIUM CHLORIDE 1000 ML: 9 INJECTION, SOLUTION INTRAVENOUS at 14:15

## 2022-05-19 RX ADMIN — HEPARIN 5 ML: 100 SYRINGE at 15:22

## 2022-05-19 NOTE — PROGRESS NOTES
Infusion Nursing Note:  Ifrah Huitron presents today for IVF's.    Patient seen by provider today: No   present during visit today: Not Applicable.    Note: N/A.      Intravenous Access:  Implanted Port.    Treatment Conditions:  Not Applicable.      Post Infusion Assessment:  Patient tolerated infusion without incident.  Blood return noted pre and post infusion.  Site patent and intact, free from redness, edema or discomfort.  No evidence of extravasations.  Access discontinued per protocol.       Discharge Plan:   Patient discharged in stable condition accompanied by: self.  Departure Mode: Ambulatory.      Maria Simon RN

## 2022-05-23 ENCOUNTER — INFUSION THERAPY VISIT (OUTPATIENT)
Dept: INFUSION THERAPY | Facility: CLINIC | Age: 72
End: 2022-05-23
Attending: FAMILY MEDICINE
Payer: MEDICARE

## 2022-05-23 VITALS
HEART RATE: 68 BPM | DIASTOLIC BLOOD PRESSURE: 73 MMHG | RESPIRATION RATE: 16 BRPM | TEMPERATURE: 98.6 F | SYSTOLIC BLOOD PRESSURE: 110 MMHG

## 2022-05-23 DIAGNOSIS — C49.9 SPINDLE CELL SARCOMA (H): ICD-10-CM

## 2022-05-23 DIAGNOSIS — T45.1X5A CHEMOTHERAPY-INDUCED NAUSEA: ICD-10-CM

## 2022-05-23 DIAGNOSIS — C49.9 SARCOMA (H): Primary | ICD-10-CM

## 2022-05-23 DIAGNOSIS — R11.0 CHEMOTHERAPY-INDUCED NAUSEA: ICD-10-CM

## 2022-05-23 LAB
ALBUMIN SERPL-MCNC: 3.1 G/DL (ref 3.4–5)
ALP SERPL-CCNC: 130 U/L (ref 40–150)
ALT SERPL W P-5'-P-CCNC: 15 U/L (ref 0–70)
ANION GAP SERPL CALCULATED.3IONS-SCNC: 5 MMOL/L (ref 3–14)
AST SERPL W P-5'-P-CCNC: 18 U/L (ref 0–45)
BASOPHILS # BLD AUTO: 0.1 10E3/UL (ref 0–0.2)
BASOPHILS NFR BLD AUTO: 1 %
BILIRUB SERPL-MCNC: 0.2 MG/DL (ref 0.2–1.3)
BUN SERPL-MCNC: 20 MG/DL (ref 7–30)
CALCIUM SERPL-MCNC: 9.1 MG/DL (ref 8.5–10.1)
CHLORIDE BLD-SCNC: 109 MMOL/L (ref 94–109)
CO2 SERPL-SCNC: 29 MMOL/L (ref 20–32)
CREAT SERPL-MCNC: 1.42 MG/DL (ref 0.66–1.25)
EOSINOPHIL # BLD AUTO: 0.3 10E3/UL (ref 0–0.7)
EOSINOPHIL NFR BLD AUTO: 3 %
ERYTHROCYTE [DISTWIDTH] IN BLOOD BY AUTOMATED COUNT: 14.1 % (ref 10–15)
GFR SERPL CREATININE-BSD FRML MDRD: 52 ML/MIN/1.73M2
GLUCOSE BLD-MCNC: 91 MG/DL (ref 70–99)
HCT VFR BLD AUTO: 31.6 % (ref 40–53)
HGB BLD-MCNC: 9.9 G/DL (ref 13.3–17.7)
IMM GRANULOCYTES # BLD: 0.1 10E3/UL
IMM GRANULOCYTES NFR BLD: 1 %
LYMPHOCYTES # BLD AUTO: 1.9 10E3/UL (ref 0.8–5.3)
LYMPHOCYTES NFR BLD AUTO: 23 %
MAGNESIUM SERPL-MCNC: 2.4 MG/DL (ref 1.6–2.3)
MCH RBC QN AUTO: 30.7 PG (ref 26.5–33)
MCHC RBC AUTO-ENTMCNC: 31.3 G/DL (ref 31.5–36.5)
MCV RBC AUTO: 98 FL (ref 78–100)
MONOCYTES # BLD AUTO: 1 10E3/UL (ref 0–1.3)
MONOCYTES NFR BLD AUTO: 13 %
NEUTROPHILS # BLD AUTO: 4.8 10E3/UL (ref 1.6–8.3)
NEUTROPHILS NFR BLD AUTO: 59 %
NRBC # BLD AUTO: 0 10E3/UL
NRBC BLD AUTO-RTO: 0 /100
PHOSPHATE SERPL-MCNC: 2.2 MG/DL (ref 2.5–4.5)
PLATELET # BLD AUTO: 192 10E3/UL (ref 150–450)
POTASSIUM BLD-SCNC: 3.6 MMOL/L (ref 3.4–5.3)
PROT SERPL-MCNC: 6.6 G/DL (ref 6.8–8.8)
RBC # BLD AUTO: 3.22 10E6/UL (ref 4.4–5.9)
SODIUM SERPL-SCNC: 143 MMOL/L (ref 133–144)
WBC # BLD AUTO: 8.1 10E3/UL (ref 4–11)

## 2022-05-23 PROCEDURE — 85004 AUTOMATED DIFF WBC COUNT: CPT | Performed by: PHYSICIAN ASSISTANT

## 2022-05-23 PROCEDURE — 36591 DRAW BLOOD OFF VENOUS DEVICE: CPT

## 2022-05-23 PROCEDURE — 250N000011 HC RX IP 250 OP 636: Performed by: PHYSICIAN ASSISTANT

## 2022-05-23 PROCEDURE — 96360 HYDRATION IV INFUSION INIT: CPT

## 2022-05-23 PROCEDURE — 83735 ASSAY OF MAGNESIUM: CPT | Performed by: PHYSICIAN ASSISTANT

## 2022-05-23 PROCEDURE — 84100 ASSAY OF PHOSPHORUS: CPT | Performed by: PHYSICIAN ASSISTANT

## 2022-05-23 PROCEDURE — 258N000003 HC RX IP 258 OP 636: Performed by: PHYSICIAN ASSISTANT

## 2022-05-23 PROCEDURE — 80053 COMPREHEN METABOLIC PANEL: CPT | Performed by: PHYSICIAN ASSISTANT

## 2022-05-23 RX ORDER — HEPARIN SODIUM (PORCINE) LOCK FLUSH IV SOLN 100 UNIT/ML 100 UNIT/ML
5 SOLUTION INTRAVENOUS
Status: COMPLETED | OUTPATIENT
Start: 2022-05-23 | End: 2022-05-23

## 2022-05-23 RX ORDER — HEPARIN SODIUM (PORCINE) LOCK FLUSH IV SOLN 100 UNIT/ML 100 UNIT/ML
5 SOLUTION INTRAVENOUS
Status: CANCELLED
Start: 2022-05-23

## 2022-05-23 RX ADMIN — SODIUM CHLORIDE 1000 ML: 9 INJECTION, SOLUTION INTRAVENOUS at 13:33

## 2022-05-23 RX ADMIN — HEPARIN 5 ML: 100 SYRINGE at 14:41

## 2022-05-23 ASSESSMENT — PAIN SCALES - GENERAL: PAINLEVEL: NO PAIN (0)

## 2022-05-23 NOTE — PROGRESS NOTES
Infusion Nursing Note:  Ifrah Huitron presents today for IVFs.    Patient seen by provider today: No   present during visit today: Not Applicable.    Note: Patient denies need for antiemetics at today's visit.      Intravenous Access:  Implanted Port.    Treatment Conditions:  N/A.      Post Infusion Assessment:  Patient tolerated infusion without incident.  Site patent and intact, free from redness, edema or discomfort.  No evidence of extravasations.  Access discontinued per protocol.       Discharge Plan:   Patient discharged in stable condition accompanied by: self.  Departure Mode: Ambulatory.      Warren Santana RN

## 2022-06-07 NOTE — PROGRESS NOTES
Ifrah is a 71 year old who is being evaluated via a billable video visit.  Currently in [place].    How would you like to obtain your AVS? MyChart  If the video visit is dropped, the invitation should be resent by: Text to cell phone: [phone]  Will anyone else be joining your video visit? Yes, patients wife Abida Urbina, JENNIFER    Video Start Time: 2:30pm    Video-Visit Details    Type of service:  Video Visit    Video End Time:3:00pm    Originating Location (pt. Location): Home    Distant Location (provider location):  [facility]     Platform used for Video Visit: [facility]      Oncology/Hematology Visit Note  Jun 8, 2022    Reason for Visit: Follow up of spindle cell sarcoma     History of Present Illness: Ifrah Huitron is a 71 year old male with PMH rosacea, pituitary macroadenoma s/p resection, GERD, kidney stones, DJD with metastatic spindle cell sarcoma. He presented to his PCP March 2021 with chest pain/left shoulder and back pain that led to imaging which revealed multiple lung mets. There were difficulties in getting diagnostic biopsy, eventually biopsy of mediastinal mass with spindle cell sarcoma. There was high PD-L1 expression (90%). Baseline imaging 6/21/21 with progression of lung mets and left-sided subdiaphragmatic mass, stable liver lesions. 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      He started Keytruda 6/21/21. CT 8/2/21 with positive response to treatment. CT 10/18/21 with mixed response- LUQ mass larger, anterior mediastinal and left anterior pleural mass smaller. Keytruda stopped.     Started on Doxil + Ifosfamide 10/26/21. Poorly tolerated with mucositis, nausea, poor oral intake. CT with stable to positive response.      Received C2 12/1/21 (delayed for tolerance issues) with 10% dose reduction.     Received C3 1/4/22 with same 10% dose reduction. He had issues with  "nausea inpatient along with recurrent thrush.      Received C4 2/2/22 with same 10% dose reduction. Had more significant neurotoxicity so only received 5.5 days. Symptoms resolved after stopping Ifosfamide.      CT 3/8/22 with positive response to treatment.      He is now on Doxil alone but has been dealing with worsening mouth and skin toxicity.     Interval History:  Ifrah is doing much better. The last 1-2 weeks he has felt close to normal-eating well, going golfing, skin sores healed. He does still have poor taste but is eating and drinking well. Mood is bright. Only rare episodes of SOB. No fevers. Mild nausea managed with Zofran. He has noted a bit of back/side pain when sitting that is similar to his prior cancer pain but it improves upon repositioning and he hasn't needed any pain medication.     Current Outpatient Medications   Medication Sig Dispense Refill     cholecalciferol 25 MCG (1000 UT) TABS Take 1,000 Units by mouth daily        escitalopram (LEXAPRO) 10 MG tablet Take 1 tablet (10 mg) by mouth daily 30 tablet 3     guaiFENesin-codeine (GUAIFENESIN AC) 100-10 MG/5ML syrup Take 10 mLs by mouth every 4 hours as needed for cough (Patient taking differently: Take 10 mLs by mouth every 4 hours as needed for cough prn) 118 mL 0     hydrocortisone (CORTEF) 10 MG tablet Take 20 mg in the morning and 10 mg in the afternoon 270 tablet 3     Insulin Syringe-Needle U-100 27.5G X 5/8\" 2 ML MISC 1 each daily as needed (with solucortef for adrenal crisis) 10 each 1     levothyroxine (SYNTHROID/LEVOTHROID) 75 MCG tablet Take 1 tablet (75 mcg) by mouth every morning 90 tablet 3     Menthol-Methyl Salicylate (DALIA MALIK GREASELESS) cream Apply topically 2 times daily       metroNIDAZOLE (METROGEL) 1 % external gel Apply topically daily .Try stronger dose due to increased symptoms after chemo. 30 g 0     omeprazole (PRILOSEC) 20 MG DR capsule Take 2 capsules (40 mg) by mouth daily 60 capsule 1     ondansetron (ZOFRAN) 4 " MG tablet Take 1-2 tablets (4-8 mg) by mouth every 8 hours as needed for nausea 60 tablet 3     potassium chloride ER (KLOR-CON M) 20 MEQ CR tablet Take 1 tablet (20 mEq) by mouth daily 30 tablet 1     prochlorperazine (COMPAZINE) 5 MG tablet Take 1 tablet (5 mg) by mouth every 6 hours as needed for nausea or vomiting . Caution: causes sedation. (Patient taking differently: Take 5 mg by mouth every 6 hours as needed for nausea or vomiting . Caution: causes sedation. PRN) 30 tablet 1     SUMAtriptan (IMITREX) 50 MG tablet Take 1 tablet (50 mg) by mouth at onset of headache for migraine May repeat in 2 hours. Max 4 tablets/24 hours. (Patient taking differently: Take 50 mg by mouth at onset of headache for migraine May repeat in 2 hours. Max 4 tablets/24 hours. PRN) 10 tablet 3     triamcinolone (KENALOG) 0.1 % external cream Apply topically 2 times daily to bothersome skin areas. 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     Thyroid disease     removed pituitary gland     Past Surgical History:   Procedure Laterality Date     COLONOSCOPY N/A 12/17/2020    Procedure: COLONOSCOPY;  Surgeon: Ken Camacho MD;  Location: [facility]     ENT SURGERY       HERNIA REPAIR       INSERT PORT VASCULAR ACCESS Right 1/28/2022    Procedure: INSERTION, VASCULAR ACCESS PORT;  Surgeon: Daniel Kinney MD;  Location: [facility]     IR CHEST PORT PLACEMENT > 5 YRS OF AGE  1/28/2022     LARYNGOSCOPY, EXCISE VOCAL CORD LESION MICROSCOPIC, COMBINED Left 07/01/2021    Procedure: MICROLARYNGOSCOPY, LEFT TRUE VOCAL CORD INJECTION WITH PROLARYN;  Surgeon: Kyree Bearden MD;  Location: [facility]     PHACOEMULSIFICATION WITH STANDARD INTRAOCULAR LENS IMPLANT Right 03/10/2021    Procedure: Cataract removal with implant.;  Surgeon: Jamir Mac MD;  Location: [facility]     PHACOEMULSIFICATION WITH STANDARD INTRAOCULAR LENS  IMPLANT Left 04/05/2021    Procedure: Cataract removal with implant.;  Surgeon: Jamir Mac MD;  Location: [facility]     PICC DOUBLE LUMEN PLACEMENT Right 12/01/2021    5FR DL PICC, basilic vein. L-38cm, 1cm out.     PICC DOUBLE LUMEN PLACEMENT Right 01/04/2022    Right cephalic, 41 cm, 1 external length     PITUITARY EXCISION       tooth pulled 4/7  Right      Allergies   Allergen Reactions     Penicillins Hives and Swelling            Social History   Social History     Tobacco Use     Smoking status: Never Smoker     Smokeless tobacco: Never Used   Substance Use Topics     Alcohol use: Yes     Comment: rare     Drug use: Never      Past medical history and social history were reviewed.    Physical Examination:  There were no vitals taken for this visit.  Wt Readings from Last 10 Encounters:   04/26/22 64 kg (141 lb 3.2 oz)   04/07/22 64.7 kg (142 lb 9.6 oz)   03/25/22 66.2 kg (146 lb)   03/10/22 66.2 kg (146 lb)   02/08/22 63 kg (138 lb 14.4 oz)   01/28/22 65.8 kg (145 lb)   01/11/22 65.1 kg (143 lb 8 oz)   12/09/21 66.7 kg (147 lb)   12/09/21 67 kg (147 lb 11.2 oz)   12/08/21 64.8 kg (142 lb 13.7 oz)     .Video physical exam  General: Patient appears well in no acute distress.   Skin: No visualized rash or lesions on visualized skin  Eyes: EOMI, no erythema, sclera icterus or discharge noted  Resp: Appears to be breathing comfortably without accessory muscle usage, speaking in full sentences, no cough  MSK: Appears to have normal range of motion based on visualized movements  Neurologic: No apparent tremors, facial movements symmetric  Psych: affect normal, alert and oriented    The rest of a comprehensive physical examination is deferred due to PHE (public health emergency) video restrictions    Laboratory Data:   Latest Reference Range & Units 06/07/22 13:36   Sodium 133 - 144 mmol/L 140   Potassium 3.4 - 5.3 mmol/L 3.6   Chloride 94 - 109 mmol/L 108   Carbon Dioxide 20 - 32 mmol/L 26   Urea Nitrogen  7 - 30 mg/dL 18   Creatinine 0.66 - 1.25 mg/dL 1.30 (H)   GFR Estimate >60 mL/min/1.73m2 58 (L) [1]   Calcium 8.5 - 10.1 mg/dL 9.0   Anion Gap 3 - 14 mmol/L 6   Magnesium 1.6 - 2.3 mg/dL 2.5 (H)   Phosphorus 2.5 - 4.5 mg/dL 1.5 (L)   Albumin 3.4 - 5.0 g/dL 3.1 (L)   Protein Total 6.8 - 8.8 g/dL 6.9   Bilirubin Total 0.2 - 1.3 mg/dL 0.3   Alkaline Phosphatase 40 - 150 U/L 132   ALT 0 - 70 U/L 17   AST 0 - 45 U/L 14   Glucose 70 - 99 mg/dL 124 (H)   WBC 4.0 - 11.0 10e3/uL 8.6   Hemoglobin 13.3 - 17.7 g/dL 10.2 (L)   Hematocrit 40.0 - 53.0 % 32.4 (L)   Platelet Count 150 - 450 10e3/uL 208   RBC Count 4.40 - 5.90 10e6/uL 3.38 (L)   MCV 78 - 100 fL 96   MCH 26.5 - 33.0 pg 30.2   MCHC 31.5 - 36.5 g/dL 31.5   RDW 10.0 - 15.0 % 13.5   % Neutrophils % 72   % Lymphocytes % 17   % Monocytes % 9   % Eosinophils % 2   % Basophils % 0   Absolute Basophils 0.0 - 0.2 10e3/uL 0.0   Absolute Eosinophils 0.0 - 0.7 10e3/uL 0.2   Absolute Immature Granulocytes <=0.4 10e3/uL 0.0   Absolute Lymphocytes 0.8 - 5.3 10e3/uL 1.5   Absolute Monocytes 0.0 - 1.3 10e3/uL 0.7   % Immature Granulocytes % 0   Absolute Neutrophils 1.6 - 8.3 10e3/uL 6.1   Absolute NRBCs 10e3/uL 0.0   NRBCs per 100 WBC <1 /100 0   (H): Data is abnormally high  (L): Data is abnormally low  [1] Effective December 21, 2021 eGFRcr in adults is calculated using the 2021 CKD-EPI creatinine equation which includes age and gender (Chiki et al., NEJ, DOI: 10.1056/ISIStd2617954)    Assessment and Plan:  1. 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 High PD-L1. Was on Keytruda but had progression, started Doxil + Ifos started 10/26/21. Port placed 1/28/22.     He completed 4 cycles of Doxil + Ifos, has had multiple tolerance issues including nausea/vomiting, dehydration, neurotoxicity, mucositis, skin toxicity, pancytopenia, hypokalemia, hypophosphatemia. CT with positive response.      Now on Doxil alone- continues to have tolerance  issues with signficant mucositis/thrush, skin sores, weakness, dehydration, HUSSEIN. CT CAP May 2022 with stable disease. Echo stable.      We skipped his May infusion to allow time for mouth and skin recovery, both of which are much improved.    Discussed with Dr. Wolff. Will restart Doxil tomorrow with dose reduction to 55mg. Will plan to do another treatment in 4 weeks and then repeat CT early August.     Will continue weekly labs and IVF with treatment in [place].     2. GI  Dyspepsia: Continue Omeprazole 40mg daily. Continue Tums PRN     CINV:  Zofran PRN.      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 Cal Saba endocrine.     Hypothyroidism continue Synthroid 75mcg daily.       4. Derm  Rosacea: Long standing issue, continue Metrogel PRN     Hand/foot: 2/2 Doxil, icing hands and feet. Continue emollients at home. Kenalog cream for bothersome/blistering and red areas. Improved with recent break from treatment.     5. MSK  Left shoulder/back/chest wall pain 2/2 tumor, following with palliative. Minimal pain currently, off all pain medications.       6. ENT  Hoarseness: Thought 2/2 mediastinal mass vs irritation from prior biopsy. Following with ENT, s/p vocal cord injection 7/1/21. Following with voice clinic.      Mucositis/Thrush: Has been a significant issue with treatment. No improvement with fluconazole or Nystatin so stopped. Continue salt/soda swishes and Healios. Continue icing mouth with treatment. Dose reduction to Doxil as above. Has improved with break.    He notes previously his mouth sores improved with Clindamycin, possibly from superimposed bacterial infection? If his mouth sores are severe again this cycle we can try a short course of Clindamycin to see if this helps.    He is scheduled to see ENT as well later in the month.     7. Pulm/Cards  Working with speech on laryngreal dysfunction.     SOB improved. CT stable. Echo stable.      Continue Guaifenesin-Coedine PRN  for cough, much improved.       Off statin due to LFT elevation, improved.      8. FEN  Hypokalemia: Recurrent issue, continue 20meq daily. He feels better on supplement.      Hypophosphatemia: Was off supplement, now low again. Will do 500mg BID until improvement, then taper. Getting weekly labs.     Renal insufficiency: Dehydration improved however his creat is still above normal and CrCl <50. Will refer to nephrology given failed improvement. Continue weekly IVF on treatment.     9. Psych/Neuro  Anxiety/depression: Improved, on Lexapro and feels like it is helping. Continue.      Continue PRN Imitrex for migraines.      10. Heme  Pancytopenia 2/2 chemotherapy. Much improved on Doxil alone.    50 minutes spent on the date of the encounter doing chart review, review of test results, interpretation of tests, patient visit, documentation and discussion with other provider(s)     Maynor Rios PA-C  Department of Hematology and Oncology  [facility]

## 2022-06-07 NOTE — PROGRESS NOTES
Infusion Nursing Note:  Ifrah Huitron presents today for IVF's.    Patient seen by provider today: No   present during visit today: Not Applicable.    Note: N/A.      Intravenous Access:  Implanted Port.    Treatment Conditions:  Results reviewed, labs MET treatment parameters, ok to proceed with treatment. Creatinine = 1.30 today      Post Infusion Assessment:  Patient tolerated infusion without incident.  Blood return noted pre and post infusion.  Site patent and intact, free from redness, edema or discomfort.  No evidence of extravasations.  Access discontinued per protocol.       Discharge Plan:   Patient discharged in stable condition accompanied by: self.  Departure Mode: Ambulatory.      Maria Simon RN

## 2022-06-08 NOTE — LETTER
6/8/2022         RE: Ifrah Huitron  [address] [place] [address]        Dear Colleague,    Thank you for referring your patient, Ifrah Huitron, to the [facility]. Please see a copy of my visit note below.    Ifrah is a 71 year old who is being evaluated via a billable video visit.  Currently in [place].    How would you like to obtain your AVS? MyChart  If the video visit is dropped, the invitation should be resent by: Text to cell phone: [phone]  Will anyone else be joining your video visit? Yes, patients wife Abida Urbina, JENNIFER    Video Start Time: 2:30pm    Video-Visit Details    Type of service:  Video Visit    Video End Time:3:00pm    Originating Location (pt. Location): Home    Distant Location (provider location):  [facility]     Platform used for Video Visit: [employer]      Oncology/Hematology Visit Note  Jun 8, 2022    Reason for Visit: Follow up of spindle cell sarcoma     History of Present Illness: Ifrah Huitron is a 71 year old male with PMH rosacea, pituitary macroadenoma s/p resection, GERD, kidney stones, DJD with metastatic spindle cell sarcoma. He presented to his PCP March 2021 with chest pain/left shoulder and back pain that led to imaging which revealed multiple lung mets. There were difficulties in getting diagnostic biopsy, eventually biopsy of mediastinal mass with spindle cell sarcoma. There was high PD-L1 expression (90%). Baseline imaging 6/21/21 with progression of lung mets and left-sided subdiaphragmatic mass, stable liver lesions. 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      He started Keytruda 6/21/21. CT 8/2/21 with positive response to treatment. CT 10/18/21 with mixed response- LUQ mass larger, anterior mediastinal and left anterior pleural mass smaller. Keytruda stopped.     Started on Doxil + Ifosfamide  "10/26/21. Poorly tolerated with mucositis, nausea, poor oral intake. CT with stable to positive response.      Received C2 12/1/21 (delayed for tolerance issues) with 10% dose reduction.     Received C3 1/4/22 with same 10% dose reduction. He had issues with nausea inpatient along with recurrent thrush.      Received C4 2/2/22 with same 10% dose reduction. Had more significant neurotoxicity so only received 5.5 days. Symptoms resolved after stopping Ifosfamide.      CT 3/8/22 with positive response to treatment.      He is now on Doxil alone but has been dealing with worsening mouth and skin toxicity.     Interval History:  Ifrah is doing much better. The last 1-2 weeks he has felt close to normal-eating well, going golfing, skin sores healed. He does still have poor taste but is eating and drinking well. Mood is bright. Only rare episodes of SOB. No fevers. Mild nausea managed with Zofran. He has noted a bit of back/side pain when sitting that is similar to his prior cancer pain but it improves upon repositioning and he hasn't needed any pain medication.     Current Outpatient Medications   Medication Sig Dispense Refill     cholecalciferol 25 MCG (1000 UT) TABS Take 1,000 Units by mouth daily        escitalopram (LEXAPRO) 10 MG tablet Take 1 tablet (10 mg) by mouth daily 30 tablet 3     guaiFENesin-codeine (GUAIFENESIN AC) 100-10 MG/5ML syrup Take 10 mLs by mouth every 4 hours as needed for cough (Patient taking differently: Take 10 mLs by mouth every 4 hours as needed for cough prn) 118 mL 0     hydrocortisone (CORTEF) 10 MG tablet Take 20 mg in the morning and 10 mg in the afternoon 270 tablet 3     Insulin Syringe-Needle U-100 27.5G X 5/8\" 2 ML MISC 1 each daily as needed (with solucortef for adrenal crisis) 10 each 1     levothyroxine (SYNTHROID/LEVOTHROID) 75 MCG tablet Take 1 tablet (75 mcg) by mouth every morning 90 tablet 3     Menthol-Methyl Salicylate (DALIA MALIK GREASELESS) cream Apply topically 2 times " daily       metroNIDAZOLE (METROGEL) 1 % external gel Apply topically daily .Try stronger dose due to increased symptoms after chemo. 30 g 0     omeprazole (PRILOSEC) 20 MG DR capsule Take 2 capsules (40 mg) by mouth daily 60 capsule 1     ondansetron (ZOFRAN) 4 MG tablet Take 1-2 tablets (4-8 mg) by mouth every 8 hours as needed for nausea 60 tablet 3     potassium chloride ER (KLOR-CON M) 20 MEQ CR tablet Take 1 tablet (20 mEq) by mouth daily 30 tablet 1     prochlorperazine (COMPAZINE) 5 MG tablet Take 1 tablet (5 mg) by mouth every 6 hours as needed for nausea or vomiting . Caution: causes sedation. (Patient taking differently: Take 5 mg by mouth every 6 hours as needed for nausea or vomiting . Caution: causes sedation. PRN) 30 tablet 1     SUMAtriptan (IMITREX) 50 MG tablet Take 1 tablet (50 mg) by mouth at onset of headache for migraine May repeat in 2 hours. Max 4 tablets/24 hours. (Patient taking differently: Take 50 mg by mouth at onset of headache for migraine May repeat in 2 hours. Max 4 tablets/24 hours. PRN) 10 tablet 3     triamcinolone (KENALOG) 0.1 % external cream Apply topically 2 times daily to bothersome skin areas. 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     Thyroid disease     removed pituitary gland     Past Surgical History:   Procedure Laterality Date     COLONOSCOPY N/A 12/17/2020    Procedure: COLONOSCOPY;  Surgeon: Ken Camacho MD;  Location: [facility]     ENT SURGERY       HERNIA REPAIR       INSERT PORT VASCULAR ACCESS Right 1/28/2022    Procedure: INSERTION, VASCULAR ACCESS PORT;  Surgeon: Daniel Kinney MD;  Location: [facility]     IR CHEST PORT PLACEMENT > 5 YRS OF AGE  1/28/2022     LARYNGOSCOPY, EXCISE VOCAL CORD LESION MICROSCOPIC, COMBINED Left 07/01/2021    Procedure: MICROLARYNGOSCOPY, LEFT TRUE VOCAL CORD INJECTION WITH PROLARYN;  Surgeon: Monisha  Kyree PIERRE MD;  Location: [facility]     PHACOEMULSIFICATION WITH STANDARD INTRAOCULAR LENS IMPLANT Right 03/10/2021    Procedure: Cataract removal with implant.;  Surgeon: Jamir Mac MD;  Location: [facility]     PHACOEMULSIFICATION WITH STANDARD INTRAOCULAR LENS IMPLANT Left 04/05/2021    Procedure: Cataract removal with implant.;  Surgeon: Jamir Mac MD;  Location: [facility]     PICC DOUBLE LUMEN PLACEMENT Right 12/01/2021    5FR DL PICC, basilic vein. L-38cm, 1cm out.     PICC DOUBLE LUMEN PLACEMENT Right 01/04/2022    Right cephalic, 41 cm, 1 external length     PITUITARY EXCISION       tooth pulled 4/7  Right      Allergies   Allergen Reactions     Penicillins Hives and Swelling            Social History   Social History     Tobacco Use     Smoking status: Never Smoker     Smokeless tobacco: Never Used   Substance Use Topics     Alcohol use: Yes     Comment: rare     Drug use: Never      Past medical history and social history were reviewed.    Physical Examination:  There were no vitals taken for this visit.  Wt Readings from Last 10 Encounters:   04/26/22 64 kg (141 lb 3.2 oz)   04/07/22 64.7 kg (142 lb 9.6 oz)   03/25/22 66.2 kg (146 lb)   03/10/22 66.2 kg (146 lb)   02/08/22 63 kg (138 lb 14.4 oz)   01/28/22 65.8 kg (145 lb)   01/11/22 65.1 kg (143 lb 8 oz)   12/09/21 66.7 kg (147 lb)   12/09/21 67 kg (147 lb 11.2 oz)   12/08/21 64.8 kg (142 lb 13.7 oz)     .Video physical exam  General: Patient appears well in no acute distress.   Skin: No visualized rash or lesions on visualized skin  Eyes: EOMI, no erythema, sclera icterus or discharge noted  Resp: Appears to be breathing comfortably without accessory muscle usage, speaking in full sentences, no cough  MSK: Appears to have normal range of motion based on visualized movements  Neurologic: No apparent tremors, facial movements symmetric  Psych: affect normal, alert and oriented    The rest of a comprehensive physical examination is deferred due  to PHE (public health emergency) video restrictions    Laboratory Data:   Latest Reference Range & Units 06/07/22 13:36   Sodium 133 - 144 mmol/L 140   Potassium 3.4 - 5.3 mmol/L 3.6   Chloride 94 - 109 mmol/L 108   Carbon Dioxide 20 - 32 mmol/L 26   Urea Nitrogen 7 - 30 mg/dL 18   Creatinine 0.66 - 1.25 mg/dL 1.30 (H)   GFR Estimate >60 mL/min/1.73m2 58 (L) [1]   Calcium 8.5 - 10.1 mg/dL 9.0   Anion Gap 3 - 14 mmol/L 6   Magnesium 1.6 - 2.3 mg/dL 2.5 (H)   Phosphorus 2.5 - 4.5 mg/dL 1.5 (L)   Albumin 3.4 - 5.0 g/dL 3.1 (L)   Protein Total 6.8 - 8.8 g/dL 6.9   Bilirubin Total 0.2 - 1.3 mg/dL 0.3   Alkaline Phosphatase 40 - 150 U/L 132   ALT 0 - 70 U/L 17   AST 0 - 45 U/L 14   Glucose 70 - 99 mg/dL 124 (H)   WBC 4.0 - 11.0 10e3/uL 8.6   Hemoglobin 13.3 - 17.7 g/dL 10.2 (L)   Hematocrit 40.0 - 53.0 % 32.4 (L)   Platelet Count 150 - 450 10e3/uL 208   RBC Count 4.40 - 5.90 10e6/uL 3.38 (L)   MCV 78 - 100 fL 96   MCH 26.5 - 33.0 pg 30.2   MCHC 31.5 - 36.5 g/dL 31.5   RDW 10.0 - 15.0 % 13.5   % Neutrophils % 72   % Lymphocytes % 17   % Monocytes % 9   % Eosinophils % 2   % Basophils % 0   Absolute Basophils 0.0 - 0.2 10e3/uL 0.0   Absolute Eosinophils 0.0 - 0.7 10e3/uL 0.2   Absolute Immature Granulocytes <=0.4 10e3/uL 0.0   Absolute Lymphocytes 0.8 - 5.3 10e3/uL 1.5   Absolute Monocytes 0.0 - 1.3 10e3/uL 0.7   % Immature Granulocytes % 0   Absolute Neutrophils 1.6 - 8.3 10e3/uL 6.1   Absolute NRBCs 10e3/uL 0.0   NRBCs per 100 WBC <1 /100 0   (H): Data is abnormally high  (L): Data is abnormally low  [1] Effective December 21, 2021 eGFRcr in adults is calculated using the 2021 CKD-EPI creatinine equation which includes age and gender (Chiki et al., NEJ, DOI: 10.1056/MZSSnm4061950)    Assessment and Plan:  1. 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 High PD-L1. Was on Keytruda but had progression, started Doxil + Ifos started 10/26/21. Port placed 1/28/22.     He completed 4  cycles of Doxil + Ifos, has had multiple tolerance issues including nausea/vomiting, dehydration, neurotoxicity, mucositis, skin toxicity, pancytopenia, hypokalemia, hypophosphatemia. CT with positive response.      Now on Doxil alone- continues to have tolerance issues with signficant mucositis/thrush, skin sores, weakness, dehydration, HUSSEIN. CT CAP May 2022 with stable disease. Echo stable.      We skipped his May infusion to allow time for mouth and skin recovery, both of which are much improved.    Discussed with Dr. Wolff. Will restart Doxil tomorrow with dose reduction to 55mg. Will plan to do another treatment in 4 weeks and then repeat CT early August.     Will continue weekly labs and IVF with treatment in [place].     2. GI  Dyspepsia: Continue Omeprazole 40mg daily. Continue Tums PRN     CINV:  Zofran PRN.      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 Cal Saba endocrine.     Hypothyroidism continue Synthroid 75mcg daily.       4. Derm  Rosacea: Long standing issue, continue Metrogel PRN     Hand/foot: 2/2 Doxil, icing hands and feet. Continue emollients at home. Kenalog cream for bothersome/blistering and red areas. Improved with recent break from treatment.     5. MSK  Left shoulder/back/chest wall pain 2/2 tumor, following with palliative. Minimal pain currently, off all pain medications.       6. ENT  Hoarseness: Thought 2/2 mediastinal mass vs irritation from prior biopsy. Following with ENT, s/p vocal cord injection 7/1/21. Following with voice clinic.      Mucositis/Thrush: Has been a significant issue with treatment. No improvement with fluconazole or Nystatin so stopped. Continue salt/soda swishes and Healios. Continue icing mouth with treatment. Dose reduction to Doxil as above. Has improved with break.    He notes previously his mouth sores improved with Clindamycin, possibly from superimposed bacterial infection? If his mouth sores are severe again this cycle we  can try a short course of Clindamycin to see if this helps.    He is scheduled to see ENT as well later in the month.     7. Pulm/Cards  Working with speech on laryngreal dysfunction.     SOB improved. CT stable. Echo stable.      Continue Guaifenesin-Coedine PRN for cough, much improved.       Off statin due to LFT elevation, improved.      8. FEN  Hypokalemia: Recurrent issue, continue 20meq daily. He feels better on supplement.      Hypophosphatemia: Was off supplement, now low again. Will do 500mg BID until improvement, then taper. Getting weekly labs.     Renal insufficiency: Dehydration improved however his creat is still above normal and CrCl <50. Will refer to nephrology given failed improvement. Continue weekly IVF on treatment.     9. Psych/Neuro  Anxiety/depression: Improved, on Lexapro and feels like it is helping. Continue.      Continue PRN Imitrex for migraines.      10. Heme  Pancytopenia 2/2 chemotherapy. Much improved on Doxil alone.    50 minutes spent on the date of the encounter doing chart review, review of test results, interpretation of tests, patient visit, documentation and discussion with other provider(s)     Maynor Rios PA-C  Department of Hematology and Oncology  [facility]         Again, thank you for allowing me to participate in the care of your patient.        Sincerely,        GERALDO Mullins

## 2022-06-08 NOTE — LETTER
6/8/2022         RE: Ifrah Huitron  [address] [place] [address]        Dear Colleague,    Thank you for referring your patient, Ifrah Huitron, to the [facility]. Please see a copy of my visit note below.    Ifrah is a 71 year old who is being evaluated via a billable video visit.  Currently in [place].    How would you like to obtain your AVS? MyChart  If the video visit is dropped, the invitation should be resent by: Text to cell phone: [phone]  Will anyone else be joining your video visit? Yes, patients wife Abida Urbina, JENNIFER    Video Start Time: 2:30pm    Video-Visit Details    Type of service:  Video Visit    Video End Time:3:00pm    Originating Location (pt. Location): Home    Distant Location (provider location):  [facility]     Platform used for Video Visit: [employer]      Oncology/Hematology Visit Note  Jun 8, 2022    Reason for Visit: Follow up of spindle cell sarcoma     History of Present Illness: Ifrah Huitron is a 71 year old male with PMH rosacea, pituitary macroadenoma s/p resection, GERD, kidney stones, DJD with metastatic spindle cell sarcoma. He presented to his PCP March 2021 with chest pain/left shoulder and back pain that led to imaging which revealed multiple lung mets. There were difficulties in getting diagnostic biopsy, eventually biopsy of mediastinal mass with spindle cell sarcoma. There was high PD-L1 expression (90%). Baseline imaging 6/21/21 with progression of lung mets and left-sided subdiaphragmatic mass, stable liver lesions. 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      He started Keytruda 6/21/21. CT 8/2/21 with positive response to treatment. CT 10/18/21 with mixed response- LUQ mass larger, anterior mediastinal and left anterior pleural mass smaller. Keytruda stopped.     Started on Doxil + Ifosfamide  "10/26/21. Poorly tolerated with mucositis, nausea, poor oral intake. CT with stable to positive response.      Received C2 12/1/21 (delayed for tolerance issues) with 10% dose reduction.     Received C3 1/4/22 with same 10% dose reduction. He had issues with nausea inpatient along with recurrent thrush.      Received C4 2/2/22 with same 10% dose reduction. Had more significant neurotoxicity so only received 5.5 days. Symptoms resolved after stopping Ifosfamide.      CT 3/8/22 with positive response to treatment.      He is now on Doxil alone but has been dealing with worsening mouth and skin toxicity.     Interval History:  Ifrah is doing much better. The last 1-2 weeks he has felt close to normal-eating well, going golfing, skin sores healed. He does still have poor taste but is eating and drinking well. Mood is bright. Only rare episodes of SOB. No fevers. Mild nausea managed with Zofran. He has noted a bit of back/side pain when sitting that is similar to his prior cancer pain but it improves upon repositioning and he hasn't needed any pain medication.     Current Outpatient Medications   Medication Sig Dispense Refill     cholecalciferol 25 MCG (1000 UT) TABS Take 1,000 Units by mouth daily        escitalopram (LEXAPRO) 10 MG tablet Take 1 tablet (10 mg) by mouth daily 30 tablet 3     guaiFENesin-codeine (GUAIFENESIN AC) 100-10 MG/5ML syrup Take 10 mLs by mouth every 4 hours as needed for cough (Patient taking differently: Take 10 mLs by mouth every 4 hours as needed for cough prn) 118 mL 0     hydrocortisone (CORTEF) 10 MG tablet Take 20 mg in the morning and 10 mg in the afternoon 270 tablet 3     Insulin Syringe-Needle U-100 27.5G X 5/8\" 2 ML MISC 1 each daily as needed (with solucortef for adrenal crisis) 10 each 1     levothyroxine (SYNTHROID/LEVOTHROID) 75 MCG tablet Take 1 tablet (75 mcg) by mouth every morning 90 tablet 3     Menthol-Methyl Salicylate (DALIA MALIK GREASELESS) cream Apply topically 2 times " daily       metroNIDAZOLE (METROGEL) 1 % external gel Apply topically daily .Try stronger dose due to increased symptoms after chemo. 30 g 0     omeprazole (PRILOSEC) 20 MG DR capsule Take 2 capsules (40 mg) by mouth daily 60 capsule 1     ondansetron (ZOFRAN) 4 MG tablet Take 1-2 tablets (4-8 mg) by mouth every 8 hours as needed for nausea 60 tablet 3     potassium chloride ER (KLOR-CON M) 20 MEQ CR tablet Take 1 tablet (20 mEq) by mouth daily 30 tablet 1     prochlorperazine (COMPAZINE) 5 MG tablet Take 1 tablet (5 mg) by mouth every 6 hours as needed for nausea or vomiting . Caution: causes sedation. (Patient taking differently: Take 5 mg by mouth every 6 hours as needed for nausea or vomiting . Caution: causes sedation. PRN) 30 tablet 1     SUMAtriptan (IMITREX) 50 MG tablet Take 1 tablet (50 mg) by mouth at onset of headache for migraine May repeat in 2 hours. Max 4 tablets/24 hours. (Patient taking differently: Take 50 mg by mouth at onset of headache for migraine May repeat in 2 hours. Max 4 tablets/24 hours. PRN) 10 tablet 3     triamcinolone (KENALOG) 0.1 % external cream Apply topically 2 times daily to bothersome skin areas. 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     Thyroid disease     removed pituitary gland     Past Surgical History:   Procedure Laterality Date     COLONOSCOPY N/A 12/17/2020    Procedure: COLONOSCOPY;  Surgeon: Ken Camacho MD;  Location: [facility]     ENT SURGERY       HERNIA REPAIR       INSERT PORT VASCULAR ACCESS Right 1/28/2022    Procedure: INSERTION, VASCULAR ACCESS PORT;  Surgeon: Daniel Kinney MD;  Location: [facility]     IR CHEST PORT PLACEMENT > 5 YRS OF AGE  1/28/2022     LARYNGOSCOPY, EXCISE VOCAL CORD LESION MICROSCOPIC, COMBINED Left 07/01/2021    Procedure: MICROLARYNGOSCOPY, LEFT TRUE VOCAL CORD INJECTION WITH PROLARYN;  Surgeon: Monisha  Kyree PIERRE MD;  Location: [facility]     PHACOEMULSIFICATION WITH STANDARD INTRAOCULAR LENS IMPLANT Right 03/10/2021    Procedure: Cataract removal with implant.;  Surgeon: Jamir Mac MD;  Location: [facility]     PHACOEMULSIFICATION WITH STANDARD INTRAOCULAR LENS IMPLANT Left 04/05/2021    Procedure: Cataract removal with implant.;  Surgeon: Jamir Mac MD;  Location: [facility]     PICC DOUBLE LUMEN PLACEMENT Right 12/01/2021    5FR DL PICC, basilic vein. L-38cm, 1cm out.     PICC DOUBLE LUMEN PLACEMENT Right 01/04/2022    Right cephalic, 41 cm, 1 external length     PITUITARY EXCISION       tooth pulled 4/7  Right      Allergies   Allergen Reactions     Penicillins Hives and Swelling            Social History   Social History     Tobacco Use     Smoking status: Never Smoker     Smokeless tobacco: Never Used   Substance Use Topics     Alcohol use: Yes     Comment: rare     Drug use: Never      Past medical history and social history were reviewed.    Physical Examination:  There were no vitals taken for this visit.  Wt Readings from Last 10 Encounters:   04/26/22 64 kg (141 lb 3.2 oz)   04/07/22 64.7 kg (142 lb 9.6 oz)   03/25/22 66.2 kg (146 lb)   03/10/22 66.2 kg (146 lb)   02/08/22 63 kg (138 lb 14.4 oz)   01/28/22 65.8 kg (145 lb)   01/11/22 65.1 kg (143 lb 8 oz)   12/09/21 66.7 kg (147 lb)   12/09/21 67 kg (147 lb 11.2 oz)   12/08/21 64.8 kg (142 lb 13.7 oz)     .Video physical exam  General: Patient appears well in no acute distress.   Skin: No visualized rash or lesions on visualized skin  Eyes: EOMI, no erythema, sclera icterus or discharge noted  Resp: Appears to be breathing comfortably without accessory muscle usage, speaking in full sentences, no cough  MSK: Appears to have normal range of motion based on visualized movements  Neurologic: No apparent tremors, facial movements symmetric  Psych: affect normal, alert and oriented    The rest of a comprehensive physical examination is deferred due  to PHE (public health emergency) video restrictions    Laboratory Data:   Latest Reference Range & Units 06/07/22 13:36   Sodium 133 - 144 mmol/L 140   Potassium 3.4 - 5.3 mmol/L 3.6   Chloride 94 - 109 mmol/L 108   Carbon Dioxide 20 - 32 mmol/L 26   Urea Nitrogen 7 - 30 mg/dL 18   Creatinine 0.66 - 1.25 mg/dL 1.30 (H)   GFR Estimate >60 mL/min/1.73m2 58 (L) [1]   Calcium 8.5 - 10.1 mg/dL 9.0   Anion Gap 3 - 14 mmol/L 6   Magnesium 1.6 - 2.3 mg/dL 2.5 (H)   Phosphorus 2.5 - 4.5 mg/dL 1.5 (L)   Albumin 3.4 - 5.0 g/dL 3.1 (L)   Protein Total 6.8 - 8.8 g/dL 6.9   Bilirubin Total 0.2 - 1.3 mg/dL 0.3   Alkaline Phosphatase 40 - 150 U/L 132   ALT 0 - 70 U/L 17   AST 0 - 45 U/L 14   Glucose 70 - 99 mg/dL 124 (H)   WBC 4.0 - 11.0 10e3/uL 8.6   Hemoglobin 13.3 - 17.7 g/dL 10.2 (L)   Hematocrit 40.0 - 53.0 % 32.4 (L)   Platelet Count 150 - 450 10e3/uL 208   RBC Count 4.40 - 5.90 10e6/uL 3.38 (L)   MCV 78 - 100 fL 96   MCH 26.5 - 33.0 pg 30.2   MCHC 31.5 - 36.5 g/dL 31.5   RDW 10.0 - 15.0 % 13.5   % Neutrophils % 72   % Lymphocytes % 17   % Monocytes % 9   % Eosinophils % 2   % Basophils % 0   Absolute Basophils 0.0 - 0.2 10e3/uL 0.0   Absolute Eosinophils 0.0 - 0.7 10e3/uL 0.2   Absolute Immature Granulocytes <=0.4 10e3/uL 0.0   Absolute Lymphocytes 0.8 - 5.3 10e3/uL 1.5   Absolute Monocytes 0.0 - 1.3 10e3/uL 0.7   % Immature Granulocytes % 0   Absolute Neutrophils 1.6 - 8.3 10e3/uL 6.1   Absolute NRBCs 10e3/uL 0.0   NRBCs per 100 WBC <1 /100 0   (H): Data is abnormally high  (L): Data is abnormally low  [1] Effective December 21, 2021 eGFRcr in adults is calculated using the 2021 CKD-EPI creatinine equation which includes age and gender (Chiki et al., NEJ, DOI: 10.1056/UBBPjt4526903)    Assessment and Plan:  1. 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 High PD-L1. Was on Keytruda but had progression, started Doxil + Ifos started 10/26/21. Port placed 1/28/22.     He completed 4  cycles of Doxil + Ifos, has had multiple tolerance issues including nausea/vomiting, dehydration, neurotoxicity, mucositis, skin toxicity, pancytopenia, hypokalemia, hypophosphatemia. CT with positive response.      Now on Doxil alone- continues to have tolerance issues with signficant mucositis/thrush, skin sores, weakness, dehydration, HUSSEIN. CT CAP May 2022 with stable disease. Echo stable.      We skipped his May infusion to allow time for mouth and skin recovery, both of which are much improved.    Discussed with Dr. Wolff. Will restart Doxil tomorrow with dose reduction to 55mg. Will plan to do another treatment in 4 weeks and then repeat CT early August.     Will continue weekly labs and IVF with treatment in [place].     2. GI  Dyspepsia: Continue Omeprazole 40mg daily. Continue Tums PRN     CINV:  Zofran PRN.      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 Cal Saba endocrine.     Hypothyroidism continue Synthroid 75mcg daily.       4. Derm  Rosacea: Long standing issue, continue Metrogel PRN     Hand/foot: 2/2 Doxil, icing hands and feet. Continue emollients at home. Kenalog cream for bothersome/blistering and red areas. Improved with recent break from treatment.     5. MSK  Left shoulder/back/chest wall pain 2/2 tumor, following with palliative. Minimal pain currently, off all pain medications.       6. ENT  Hoarseness: Thought 2/2 mediastinal mass vs irritation from prior biopsy. Following with ENT, s/p vocal cord injection 7/1/21. Following with voice clinic.      Mucositis/Thrush: Has been a significant issue with treatment. No improvement with fluconazole or Nystatin so stopped. Continue salt/soda swishes and Healios. Continue icing mouth with treatment. Dose reduction to Doxil as above. Has improved with break.    He notes previously his mouth sores improved with Clindamycin, possibly from superimposed bacterial infection? If his mouth sores are severe again this cycle we  can try a short course of Clindamycin to see if this helps.    He is scheduled to see ENT as well later in the month.     7. Pulm/Cards  Working with speech on laryngreal dysfunction.     SOB improved. CT stable. Echo stable.      Continue Guaifenesin-Coedine PRN for cough, much improved.       Off statin due to LFT elevation, improved.      8. FEN  Hypokalemia: Recurrent issue, continue 20meq daily. He feels better on supplement.      Hypophosphatemia: Was off supplement, now low again. Will do 500mg BID until improvement, then taper. Getting weekly labs.     Renal insufficiency: Dehydration improved however his creat is still above normal and CrCl <50. Will refer to nephrology given failed improvement. Continue weekly IVF on treatment.     9. Psych/Neuro  Anxiety/depression: Improved, on Lexapro and feels like it is helping. Continue.      Continue PRN Imitrex for migraines.      10. Heme  Pancytopenia 2/2 chemotherapy. Much improved on Doxil alone.    50 minutes spent on the date of the encounter doing chart review, review of test results, interpretation of tests, patient visit, documentation and discussion with other provider(s)     Maynor Rios PA-C  Department of Hematology and Oncology  [facility]         Again, thank you for allowing me to participate in the care of your patient.        Sincerely,        GERALDO Mullins

## 2022-06-09 NOTE — PROGRESS NOTES
Infusion Nursing Note:  Ifrah Huitron presents today for Doxil and fluids.    Patient seen by provider today: Yes: He saw Maynor CHOW yesterday therefore assessment deferred.   present during visit today: Not Applicable.    Note: Ice applied to hands and gave ice for mouth as well.      Intravenous Access:  Implanted Port.    Treatment Conditions:  Lab Results   Component Value Date    HGB 10.2 (L) 06/07/2022    WBC 8.6 06/07/2022    ANEU 3.3 04/22/2022    ANEUTAUTO 6.1 06/07/2022     06/07/2022      Lab Results   Component Value Date     06/07/2022    POTASSIUM 3.6 06/07/2022    MAG 2.5 (H) 06/07/2022    CR 1.30 (H) 06/07/2022    COTY 9.0 06/07/2022    BILITOTAL 0.3 06/07/2022    ALBUMIN 3.1 (L) 06/07/2022    ALT 17 06/07/2022    AST 14 06/07/2022     Results reviewed, labs MET treatment parameters, ok to proceed with treatment.    Per treatment communication from Maynor CHOW, OK to proceed with tx if creat < 1.5.      Post Infusion Assessment:  Patient tolerated infusion without incident.  Blood return noted pre and post infusion.  Site patent and intact, free from redness, edema or discomfort.  No evidence of extravasations.  Access discontinued per protocol.       Discharge Plan:   Copy of AVS reviewed with patient and/or family.  Patient will return next week for next appointment. He is working on getting appointments scheduled.  Patient discharged in stable condition accompanied by: self.  Departure Mode: Ambulatory.      Suzette Riley RN

## 2022-06-15 NOTE — PROGRESS NOTES
"Infusion Nursing Note:  Ifrah Huitron presents today for IVF.    Patient seen by provider today: No   present during visit today: Not Applicable.    Note: Pt requesting fluids regardless of labs because he's feeling a bit run down today and states he might have \"over done it\" yesterday in the heat.    Intravenous Access:  Implanted Port.    Treatment Conditions:  Lab Results   Component Value Date    HGB 9.7 (L) 06/15/2022    WBC 6.5 06/15/2022    ANEU 3.3 04/22/2022    ANEUTAUTO 4.7 06/15/2022     06/15/2022      Lab Results   Component Value Date     06/15/2022    POTASSIUM 3.1 (L) 06/15/2022    MAG 2.1 06/15/2022    CR 1.17 06/15/2022    COTY 8.6 06/15/2022    BILITOTAL 0.3 06/15/2022    ALBUMIN 3.1 (L) 06/15/2022    ALT 15 06/15/2022    AST 12 06/15/2022     Results reviewed, labs MET treatment parameters, ok to proceed with treatment.    K+3.1 Replaced per electrolyte replacement protocol. Pt states he did take his K+ today at home.    Post Infusion Assessment:  Patient tolerated infusion without incident.  Blood return noted pre and post infusion.  Site patent and intact, free from redness, edema or discomfort.  No evidence of extravasations.  Access discontinued per protocol.     Discharge Plan:   Discharge instructions reviewed with: Patient.  Patient and/or family verbalized understanding of discharge instructions and all questions answered.  Patient discharged in stable condition accompanied by: self.  Departure Mode: Ambulatory.      Sarahi Astudillo RN                    "

## 2022-06-15 NOTE — PROGRESS NOTES
Treatment Conditions:  Potassium 3.1, gave 40 mEq potassium orally per pt request, prior to discharge.    Post Infusion Assessment:  Patient tolerated infusion without incident.  Blood return noted pre and post infusion.  Site patent and intact, free from redness, edema or discomfort.  No evidence of extravasations.  Access discontinued per protocol.     Discharge Plan:   Copy of AVS reviewed with patient and/or family.  Patient will return 6/21/22 for next appointment.  Patient discharged in stable condition accompanied by: self.  Departure Mode: Ambulatory.      Suzette Riley RN

## 2022-06-21 NOTE — PROGRESS NOTES
Infusion Nursing Note:  Ifrah Huitron presents today for IVF.    Patient seen by provider today: No   present during visit today: Not Applicable.    Note: Patient feeling dehydrated and asking for IVF prior to creat results. No electrolyte replacement indicated today.    Intravenous Access:  Implanted Port.    Treatment Conditions:  Lab Results   Component Value Date    HGB 10.4 (L) 06/21/2022    WBC 7.7 06/21/2022    ANEU 3.3 04/22/2022    ANEUTAUTO 5.8 06/21/2022     06/21/2022      Lab Results   Component Value Date     06/21/2022    POTASSIUM 3.8 06/21/2022    MAG 2.5 (H) 06/21/2022    CR 1.08 06/21/2022    COTY 9.1 06/21/2022    BILITOTAL 0.4 06/21/2022    ALBUMIN 3.2 (L) 06/21/2022    ALT 17 06/21/2022    AST 15 06/21/2022     Post Infusion Assessment:  Patient tolerated infusion without incident.  Blood return noted pre and post infusion.  Site patent and intact, free from redness, edema or discomfort.  No evidence of extravasations.  Access discontinued per protocol.     Discharge Plan:   Discharge instructions reviewed with: Patient.  Patient and/or family verbalized understanding of discharge instructions and all questions answered.  AVS to patient via [employer].  Patient will return 6/28/2022 for next appointment.   Patient discharged in stable condition accompanied by: self.  Departure Mode: Ambulatory.    Erin Lowe RN

## 2022-06-27 NOTE — PATIENT INSTRUCTIONS
Per physician's instructions    Stop Clindamycin  Start Fluconazole 200 mg daily x 7 days  Mycelex troches

## 2022-06-27 NOTE — PROGRESS NOTES
"Video visit    Start time 11:42, stop time 11:59; additional 14 minutes spent on the date of the encounter doing chart review, documentation and further activities as noted.     Provider location: [facility], [address] [place] [address] [address], [place] [address]    Patient location: patient home    Mode of transmission: video     Subjective:    Established patient; the following is a comprehensive summary of his Endocrine care to date.     Ifrah Huitron is a 71 year old male who presents for hypopituitarism.      CT head obtained 2010 due to headaches with finding of a sellar lesion and subsequent MRI 8/22/2010 ([facility]) per OSH radiology read: \"22.9 mm enhancing lesion within the sella, the lesion abuts the carotid siphons, but does not grossly encase them.  Mass effect on the right carotid siphon is slightly more pronounced than the mass effect on the left, the lesion abuts the right aspect of the optic chiasm without janneth distortion of the chiasm.\" Pre-op Ifrah does not recall vision loss from the pituitary tumor.      S/p transsphenoidal pituitary surgery 8/23/2010 done by Dr. Khanna and Dr. Fontana at [facility]. I read the operative report and the surgeons suspected that there was normal residual pituitary left behind. Per the OSH pathology report the specimen was consistent with a pituitary adenoma and the IHC stains were negative for: FSH, LH, ACTH, TSH, prolactin, GH.      OSH MRI brain ([facility]) 6/30/2016: radiology read \"Postoperative changes related to transsphenoidal resection of a previously noted bulky intrasellar tumor there is a concave morphology to the mixed signal tissue along the floor of the sella. Presumed normal pituitary tissue is noted within the left aspect of the sella. The infundibulum is deviated to the left. Nothing to suggest residual or recurrent tumor. Normal cavernous sinuses. Normal optic chiasm.\"      Most recent imaging MRI brain 9/29/2021 ([facility]):  -per radiology: \"sella region appears stable in the interval\" compared to prior MRI study 5/23/2019, I reached out directly to radiology to get more details re the sella on his study but never heard back  -my review: cuts through the sella are very thick, there appears to be residual pituitary tissue and non-specific post-surgical changes in the sella, no overt structural recurrence/residual disease.      Currently: no peripheral vision abnormalities. He has chronic headaches that are unchanged.      FSH/LH:  -most recent total testosterone 311 ng/dL (ref range 241-826 ng/dL) 9/2020 and this was collected at 11:20 AM, total testosterone was always normal on many draws over the years except it was low when checked a few weeks post-op in 2010  -He has never been on testosterone therapy     ACTH:  -Hydrocortisone started prior to pituitary surgery due to suspected secondary adrenal insufficiency, it is unclear to me what his baseline cortisol was prior to starting HC although there was a serum cortisol value of 1 mcg/dL (drawn at 11:53 AM) with concomitant ACTH 8 pg/mL pre-op in 2010  -He had an ACTH of 30 pg/mL in 2018   -Current dose of hydrocortisone: 20 mg about 1 hour after waking and then 10 mg around 5 - 7 PM  -In the setting of his cancer therapy he has lost about 20-30 # over the past 1 year, he also has chronic nausea, chronic fatigue, however (6/27/2022) he notes in the past month or 2 with holding his Doxil he has felt improved with regards to energy, motivation, brain fog. He has recurrent thrush that limits PO intake, but appetite is good.       TSH:  -Thyroid hormone was started prior to pituitary surgery due to suspected central hypothyroidism   -2/4/2022: free T4 1.45 (ref range 0.76 - 1.46 ng/dL), TSH 0.21   -Chronic stable dose of levothyroxine 75 mcg, once daily, 7 days/week, he takes it appropriately to maximize absorption    -Current weight ~140 #, 5'7\"    4/26/2022: " free T4 1.43 (ULN 1.46 ng/dL)     Prolactin:  -most recently 9/2020: prolactin normal and always normal over the years including pre-op  -no galactorrhea      GH:  -most recently 9/2020: IGF-1 43 (ref range 53 - 222 ng/mL) with Z-score -2.3 (ref range -2.0 - 2.0 S.D.)  -no increase in shoe/ring size      DI:   -He developed transient post-operative DI  -Serum sodium has remained normal, most recently 6/2022  -No polyuria      History is notable for a single episode of nephrolithiasis (remote, around the year 2000). No prior hypercalcemia.      No HbA1c. No history of DM.      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 Treatment included Keytruda 6/2021 - 10/2021 and combination chemotherapy was started 10/2021 and due to toxicity he started Doxil alone 3/2022 (done as an outpatient, one infusion monthly). He follows with Dr. Wolff. He has a hoarse voice due to the mediastinal mass impairing vocal fold motion and follows with ENT.       Father had nephrolithiasis. No family members with pituitary disease. No pancreatic pathology in the family. No FH of MEN.      Objective:    Virtual visit.    Assessment/Plan:    # Non-functional pituitary macroadenoma, s/p transsphenoidal resection in 2010    He will monitor for headaches and peripheral vision changes. Currently he has neither. Follow-up visit in 6 months ordered.      # Secondary adrenal insufficiency     I recommend that he tries either hydrocortisone 20 mg as soon as he wakes up in the morning and 10 mg about 6 hours later around midday or he can try hydrocortisone 15 mg as soon as he wakes up in the morning, 10 mg about 6 hours later around midday, and 5 mg in the late afternoon.    We again reviewed sick day rules.     He should double  his dose of hydrocortisone when sick and on the day of his chemotherapy infusion and for 2 days after.     He does have an emergency hydrocortisone injection kit at home and we reviewed that he will give 100 mg when he is ill and cannot take hydrocortisone by mouth and then call 911.  We also reviewed that he will require intravenous hydrocortisone with taper for any surgery or procedure.     We also reviewed that he should have a medical alert bracelet that states he has hypopituitarism, adrenal insufficiency, and hypothyroidism, and he will obtain this.     # Central hypothyroidism     Because free T4 is high normal he will reduce his dose of thyroid hormone.  Instead of taking levothyroxine 1 tablet/day, 7 days/week for total of 7 tablets weekly he will take 6.5 tablets/week.  So this will be 1 tablet daily, but 1 day/week take only 1/2 tablet instead of a full tablet. Each tablet is 75 mcg.    Free T4 ordered for 6 months.

## 2022-06-27 NOTE — LETTER
"    6/27/2022         RE: Ifrah Huitron  [id number]  [place] [address]        Dear Colleague,    Thank you for referring your patient, Ifrah Huitron, to the [facility]. Please see a copy of my visit note below.    Video visit    Start time 11:42, stop time 11:59; additional 14 minutes spent on the date of the encounter doing chart review, documentation and further activities as noted.     Provider location: [facility], [address] [place] [address] [address], [place] [address]    Patient location: patient home    Mode of transmission: video     Subjective:    Established patient; the following is a comprehensive summary of his Endocrine care to date.     Ifrah Huitron is a 71 year old male who presents for hypopituitarism.      CT head obtained 2010 due to headaches with finding of a sellar lesion and subsequent MRI 8/22/2010 ([facility]) per OSH radiology read: \"22.9 mm enhancing lesion within the sella, the lesion abuts the carotid siphons, but does not grossly encase them.  Mass effect on the right carotid siphon is slightly more pronounced than the mass effect on the left, the lesion abuts the right aspect of the optic chiasm without janneth distortion of the chiasm.\" Pre-op Ifrah does not recall vision loss from the pituitary tumor.      S/p transsphenoidal pituitary surgery 8/23/2010 done by Dr. Khanna and Dr. Fontana at [facility] in [place]. I read the operative report and the surgeons suspected that there was normal residual pituitary left behind. Per the OSH pathology report the specimen was consistent with a pituitary adenoma and the IHC stains were negative for: FSH, LH, ACTH, TSH, prolactin, GH.      OSH MRI brain ([facility]) 6/30/2016: radiology read \"Postoperative changes related to transsphenoidal resection of a previously noted bulky intrasellar tumor there is a concave morphology to the mixed signal tissue along the floor of the sella. Presumed normal " "pituitary tissue is noted within the left aspect of the sella. The infundibulum is deviated to the left. Nothing to suggest residual or recurrent tumor. Normal cavernous sinuses. Normal optic chiasm.\"      Most recent imaging MRI brain 9/29/2021 ([facility]):  -per radiology: \"sella region appears stable in the interval\" compared to prior MRI study 5/23/2019, I reached out directly to radiology to get more details re the sella on his study but never heard back  -my review: cuts through the sella are very thick, there appears to be residual pituitary tissue and non-specific post-surgical changes in the sella, no overt structural recurrence/residual disease.      Currently: no peripheral vision abnormalities. He has chronic headaches that are unchanged.      FSH/LH:  -most recent total testosterone 311 ng/dL (ref range 241-826 ng/dL) 9/2020 and this was collected at 11:20 AM, total testosterone was always normal on many draws over the years except it was low when checked a few weeks post-op in 2010  -He has never been on testosterone therapy     ACTH:  -Hydrocortisone started prior to pituitary surgery due to suspected secondary adrenal insufficiency, it is unclear to me what his baseline cortisol was prior to starting HC although there was a serum cortisol value of 1 mcg/dL (drawn at 11:53 AM) with concomitant ACTH 8 pg/mL pre-op in 2010  -He had an ACTH of 30 pg/mL in 2018   -Current dose of hydrocortisone: 20 mg about 1 hour after waking and then 10 mg around 5 - 7 PM  -In the setting of his cancer therapy he has lost about 20-30 # over the past 1 year, he also has chronic nausea, chronic fatigue, however (6/27/2022) he notes in the past month or 2 with holding his Doxil he has felt improved with regards to energy, motivation, brain fog. He has recurrent thrush that limits PO intake, but appetite is good.       TSH:  -Thyroid hormone was started prior to pituitary surgery due to suspected central " "hypothyroidism   -2/4/2022: free T4 1.45 (ref range 0.76 - 1.46 ng/dL), TSH 0.21   -Chronic stable dose of levothyroxine 75 mcg, once daily, 7 days/week, he takes it appropriately to maximize absorption    -Current weight ~140 #, 5'7\"    4/26/2022: free T4 1.43 (ULN 1.46 ng/dL)     Prolactin:  -most recently 9/2020: prolactin normal and always normal over the years including pre-op  -no galactorrhea      GH:  -most recently 9/2020: IGF-1 43 (ref range 53 - 222 ng/mL) with Z-score -2.3 (ref range -2.0 - 2.0 S.D.)  -no increase in shoe/ring size      DI:   -He developed transient post-operative DI  -Serum sodium has remained normal, most recently 6/2022  -No polyuria      History is notable for a single episode of nephrolithiasis (remote, around the year 2000). No prior hypercalcemia.      No HbA1c. No history of DM.      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 Treatment included Keytruda 6/2021 - 10/2021 and combination chemotherapy was started 10/2021 and due to toxicity he started Doxil alone 3/2022 (done as an outpatient, one infusion monthly). He follows with Dr. Wolff. He has a hoarse voice due to the mediastinal mass impairing vocal fold motion and follows with ENT.       Father had nephrolithiasis. No family members with pituitary disease. No pancreatic pathology in the family. No FH of MEN.      Objective:    Virtual visit.    Assessment/Plan:    # Non-functional pituitary macroadenoma, s/p transsphenoidal resection in 2010    He will monitor for headaches and peripheral vision changes. Currently he has neither. Follow-up visit in 6 months ordered.      # Secondary adrenal insufficiency     I recommend that he tries either hydrocortisone 20 mg as soon as he wakes up in the morning " and 10 mg about 6 hours later around midday or he can try hydrocortisone 15 mg as soon as he wakes up in the morning, 10 mg about 6 hours later around midday, and 5 mg in the late afternoon.    We again reviewed sick day rules.     He should double his dose of hydrocortisone when sick and on the day of his chemotherapy infusion and for 2 days after.     He does have an emergency hydrocortisone injection kit at home and we reviewed that he will give 100 mg when he is ill and cannot take hydrocortisone by mouth and then call 911.  We also reviewed that he will require intravenous hydrocortisone with taper for any surgery or procedure.     We also reviewed that he should have a medical alert bracelet that states he has hypopituitarism, adrenal insufficiency, and hypothyroidism, and he will obtain this.     # Central hypothyroidism     Because free T4 is high normal he will reduce his dose of thyroid hormone.  Instead of taking levothyroxine 1 tablet/day, 7 days/week for total of 7 tablets weekly he will take 6.5 tablets/week.  So this will be 1 tablet daily, but 1 day/week take only 1/2 tablet instead of a full tablet. Each tablet is 75 mcg.    Free T4 ordered for 6 months.       Again, thank you for allowing me to participate in the care of your patient.        Sincerely,        Elpidio Tao MD

## 2022-06-27 NOTE — PROGRESS NOTES
Chief Complaint   Patient presents with     Ent Problem     Mouth/Lip Problem     Thrush reoccurs after his antibiotics - unable to eat due to thrush and has lost 30 pounds in the last year- today inside mouth very painful rate 6/10      History of Present Illness  Ifrah NORMA Huitron is a 71 year old male who present stoday for follow-up.  The patient went to the operating room on 7/1/2021 and underwent a left true vocal fold injection with Prolaryn gel.  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       After the injection, the patient was not having significant improvement in his voice and we sent him down to the [facility] and he underwent speech therapy and had significant improvement in his voice. no improvement in his voice.      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  He is currently using nystatin swish and spit and is on oral clindamycin.  He denies any significant swallowing difficulty is having pain with swallowing.  No hemoptysis, neck lumps/bumps/swelling, or unintentional weight loss. The patient denies any recent intubations or throat procedures.  The patient is a lifetime non-smoker.  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    Past Medical History  Patient Active Problem List   Diagnosis     Calculus of kidney     Degeneration of lumbar or lumbosacral intervertebral disc     Eczema     Epidural lipomatosis     TUYET (generalized anxiety disorder)     Hypopituitarism (H)     Central hypothyroidism     Osteoarthritis of spine with radiculopathy, lumbar region     Pituitary macroadenoma (H)     Rosacea     Chronic lower back pain     Spindle cell sarcoma (H)     Mesothelioma, malignant (H)     Vocal fold paralysis, left     Dysphonia     Muscle tension dysphonia     Mediastinal lymphadenopathy      "Inguinal hernia     Chemotherapy-induced nausea     Hypophosphatemia     Anemia     Chemotherapy-induced neutropenia (H)     Sarcoma (H)     Encounter for other specified aftercare     Thrush     Anemia in neoplastic disease     Secondary adrenal insufficiency (H)     History of pituitary surgery     Current Medications    Current Outpatient Medications:      cholecalciferol 25 MCG (1000 UT) TABS, Take 1,000 Units by mouth daily , Disp: , Rfl:      clindamycin (CLEOCIN) 300 MG capsule, Take 1 capsule (300 mg) by mouth 3 times daily for 7 days, Disp: 21 capsule, Rfl: 0     clotrimazole (MYCELEX) 10 MG lozenge, Place 1 lozenge (10 mg) inside cheek 5 times daily, Disp: 90 lozenge, Rfl: 1     escitalopram (LEXAPRO) 10 MG tablet, Take 1 tablet (10 mg) by mouth daily, Disp: 30 tablet, Rfl: 3     fluconazole (DIFLUCAN) 200 MG tablet, Take 1 tablet (200 mg) by mouth daily for 7 days Can start 2-3 days prior to chemotherapy, Disp: 7 tablet, Rfl: 1     guaiFENesin-codeine (GUAIFENESIN AC) 100-10 MG/5ML syrup, Take 10 mLs by mouth every 4 hours as needed for cough (Patient taking differently: Take 10 mLs by mouth every 4 hours as needed for cough prn), Disp: 118 mL, Rfl: 0     hydrocortisone (CORTEF) 10 MG tablet, Take 20 mg in the morning and 10 mg in the afternoon, Disp: 270 tablet, Rfl: 3     Insulin Syringe-Needle U-100 27.5G X 5/8\" 2 ML MISC, 1 each daily as needed (with solucortef for adrenal crisis), Disp: 10 each, Rfl: 1     levothyroxine (SYNTHROID/LEVOTHROID) 75 MCG tablet, Take 1 tablet (75 mcg) by mouth every morning, Disp: 90 tablet, Rfl: 3     Menthol-Methyl Salicylate (DALIA MALIK GREASELESS) cream, Apply topically 2 times daily, Disp: , Rfl:      metroNIDAZOLE (METROGEL) 1 % external gel, Apply topically daily .Try stronger dose due to increased symptoms after chemo., Disp: 30 g, Rfl: 0     nystatin (MYCOSTATIN) 719892 UNIT/ML suspension, Take 5 mLs (500,000 Units) by mouth 4 times daily for 7 days, Disp: 140 mL, " Rfl: 0     omeprazole (PRILOSEC) 20 MG DR capsule, Take 2 capsules (40 mg) by mouth daily, Disp: 60 capsule, Rfl: 1     ondansetron (ZOFRAN) 4 MG tablet, Take 1-2 tablets (4-8 mg) by mouth every 8 hours as needed for nausea, Disp: 60 tablet, Rfl: 3     phosphorus tablet 250 mg (PHOSPHA 250 NEUTRAL) 250 MG per tablet, Take 1 tablet (250 mg) by mouth 2 times daily, Disp: 50 tablet, Rfl: 0     potassium chloride ER (KLOR-CON M) 20 MEQ CR tablet, Take 1 tablet (20 mEq) by mouth daily, Disp: 30 tablet, Rfl: 1     prochlorperazine (COMPAZINE) 5 MG tablet, Take 1 tablet (5 mg) by mouth every 6 hours as needed for nausea or vomiting . Caution: causes sedation. (Patient taking differently: Take 5 mg by mouth every 6 hours as needed for nausea or vomiting . Caution: causes sedation. PRN), Disp: 30 tablet, Rfl: 1     SUMAtriptan (IMITREX) 50 MG tablet, Take 1 tablet (50 mg) by mouth at onset of headache for migraine May repeat in 2 hours. Max 4 tablets/24 hours. (Patient taking differently: Take 50 mg by mouth at onset of headache for migraine May repeat in 2 hours. Max 4 tablets/24 hours. PRN), Disp: 10 tablet, Rfl: 3     triamcinolone (KENALOG) 0.1 % external cream, Apply topically 2 times daily to bothersome skin areas., Disp: 30 g, Rfl: 3    Allergies  Allergies   Allergen Reactions     Penicillins Hives and Swelling              Social History  Social History     Socioeconomic History     Marital status:    Tobacco Use     Smoking status: Never Smoker     Smokeless tobacco: Never Used   Substance and Sexual Activity     Alcohol use: Yes     Comment: rare     Drug use: Never     Social Determinants of Health     Financial Resource Strain: Low Risk      Difficulty of Paying Living Expenses: Not very hard   Food Insecurity: No Food Insecurity     Worried About Running Out of Food in the Last Year: Never true     Ran Out of Food in the Last Year: Never true   Transportation Needs: No Transportation Needs     Lack of  Transportation (Medical): No     Lack of Transportation (Non-Medical): No   Physical Activity: Inactive     Days of Exercise per Week: 0 days     Minutes of Exercise per Session: 0 min   Social Connections: Moderately Integrated     Frequency of Communication with Friends and Family: Twice a week     Frequency of Social Gatherings with Friends and Family: Twice a week     Attends Restorationist Services: Never     Active Member of Clubs or Organizations: Yes     Attends Club or Organization Meetings: 1 to 4 times per year     Marital Status:    Intimate Partner Violence: Unknown     Fear of Current or Ex-Partner: No     Emotionally Abused: No     Sexually Abused: No       Family History  Family History   Problem Relation Age of Onset     Lupus Mother      ALS Father      Rheumatoid Arthritis Sister        Review of Systems  As per HPI and PMHx, otherwise 10 system review including the head and neck, constitutional, eyes, respiratory, GI, skin, neurologic, lymphatic, endocrine, and allergy systems is negative.    Physical Exam  BP (!) 133/90 (BP Location: Left arm, Patient Position: Chair, Cuff Size: Adult Regular)   Pulse 60   Temp 97  F (36.1  C)   Wt 60.3 kg (133 lb)   BMI 20.83 kg/m    GENERAL: Patient is a pleasant, cooperative 71 year old male in no acute distress.  HEAD: Normocephalic, atraumatic.  Hair and scalp are normal.  EYES: Pupils are equal, round, reactive to light and accommodation.  Extraocular movements are intact.  The sclera nonicteric without injection.  The extraocular structures are normal.  EARS: Normal shape and symmetry.  No tenderness when palpating the mastoid or tragal areas bilaterally.   NOSE: Nares are patent.  Nasal mucosa is pink and moist.  Nasal septum is midline.  Negative anterior rhinoscopy.  ORAL CAVITY: Dentition is in good repair.  Mucous membranes are moist.  Tongue is mobile, protrudes to the midline.  Palate elevates symmetrically.  No erythema or exudate.  No oral  cavity or oropharyngeal masses, lesions, ulcerations, leukoplakia.  NECK: Supple, trachea is midline.  There no palpable cervical lymphadenopathy or masses bilaterally.  Palpation of the bilateral parotid and submandibular areas reveal no masses.  No thyromegaly.    NEUROLOGIC: Cranial nerves II through XII are grossly intact.  The patients voice is hoarse and raspy.  Patient does show some signs of right hemifacial spasm.  He also has some weakness in the facial nerve on the right-hand side.  The patient is House-Brackmann III/VI on the right for some brow weakness, mid facial weakness, and marginal division weakness.  Patient is House-Brackmann I/VI.  CARDIOVASCULAR: Extremities are warm and well-perfused.  No significant peripheral edema.  RESPIRATORY: Patient has nonlabored breathing without cough, wheeze, stridor.  PSYCHIATRIC: Patient is alert and oriented.  Mood and affect appear normal.  SKIN: Warm and dry.  No scalp, face, or neck lesions noted.     Procedure: Flexible Laryngoscopy  Indication: Dysphonia     To best visualize the upper airway anatomy and due to the chief complaint and HPI, I proceeded with flexible fiberoptic laryngoscopy examination.  The bilateral nasal cavities were anesthetized and decongested with a mixture of lidocaine and neosynephrine.  The bilateral nasal cavities were examined using a flexible fiberoptic laryngoscope.  There were no nasal cavity masses, polyps, or mucopurulence bilaterally.  The nasal septum is midline.  The nasopharynx had a normal appearance with normal Eustachian tube openings and fossa of Rosenmuller bilaterally.  Minimal adenoid tissue.  The base of tongue, vallecula, epiglottis, aryepiglottic folds, arytenoids, and piriform sinuses were without mass or lesion.  There is a moderate amount of interarytenoid thickening and a mild amount of erythema.  The right true vocal fold has excellent abduction adduction, the left true vocal fold is in a fixed, paramedian  position with good medialization.  The bilateral true vocal folds were without nodules or masses.  There was  mild supraglottic phonation/squeeze.  The visualized portions of the infraglottic and subglottic airway are unremarkable.  There is no significant erythema or signs of oropharyngeal candidiasis on examination today.  The scope was removed.  The patient tolerated the procedure well.                    Assessment and Plan     ICD-10-CM    1. Thrush  B37.0 Otolaryngology Referral     fluconazole (DIFLUCAN) 200 MG tablet     clotrimazole (MYCELEX) 10 MG lozenge   2. Oropharyngeal candidiasis  B37.0 fluconazole (DIFLUCAN) 200 MG tablet     clotrimazole (MYCELEX) 10 MG lozenge   3. Dysphonia  R49.0 fluconazole (DIFLUCAN) 200 MG tablet     clotrimazole (MYCELEX) 10 MG lozenge   4. Vocal fold paralysis, left  J38.01 fluconazole (DIFLUCAN) 200 MG tablet     clotrimazole (MYCELEX) 10 MG lozenge   5. Muscle tension dysphonia  R49.0 fluconazole (DIFLUCAN) 200 MG tablet     clotrimazole (MYCELEX) 10 MG lozenge   6. Spindle cell sarcoma (H)  C49.9 fluconazole (DIFLUCAN) 200 MG tablet     clotrimazole (MYCELEX) 10 MG lozenge      It was my pleasure seeing Ifrah Huitron today in clinic.  On exam today, he does not have any obvious signs of thrush.  He does have some signs of mucositis within his mouth specifically.  He is currently on clindamycin, which I think will have him stop.  We discussed transitioning to some oral fluconazole and some Mycelex atrocious.  He might have to start fluconazole a few days prior to getting chemo if this continues to be a recurrent problem.    From a voice standpoint, his voice has had excellent improvement after speech therapy and his vocal fold injection.  I would recommend observation in this regard.    If the mucositis continues to worsen, we might want to consider some dexamethasone mouth rinses which would increase his risk of candidiasis but might help with some of the inflammation.   We will try the Mycelex and the fluconazole first and see how he does in this regard.    [place] to follow up with Primary Care provider regarding elevated blood pressure.    Kyree Bearden MD  Department of Otolaryngology-Head and Neck Surgery  [facility]

## 2022-06-27 NOTE — LETTER
6/27/2022         RE: Ifrah Huitron  [id number]  [facility] [address]        Dear Colleague,    Thank you for referring your patient, Ifrah Huitron, to the [facility]. Please see a copy of my visit note below.    Chief Complaint   Patient presents with     Ent Problem     Mouth/Lip Problem     Thrush reoccurs after his antibiotics - unable to eat due to thrush and has lost 30 pounds in the last year- today inside mouth very painful rate 6/10      History of Present Illness  Ifrah Huitron is a 71 year old male who present stoday for follow-up.  The patient went to the operating room on 7/1/2021 and underwent a left true vocal fold injection with Prolaryn gel.  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       After the injection, the patient was not having significant improvement in his voice and we sent him down to the [facility] and he underwent speech therapy and had significant improvement in his voice. no improvement in his voice.      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  He is currently using nystatin swish and spit and is on oral clindamycin.  He denies any significant swallowing difficulty is having pain with swallowing.  No hemoptysis, neck lumps/bumps/swelling, or unintentional weight loss. The patient denies any recent intubations or throat procedures.  The patient is a lifetime non-smoker.  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    Past Medical History  Patient Active Problem List   Diagnosis     Calculus of kidney     Degeneration of lumbar or lumbosacral intervertebral disc     Eczema     Epidural lipomatosis     TUYET (generalized anxiety disorder)     Hypopituitarism (H)     Central hypothyroidism     Osteoarthritis of spine with radiculopathy, lumbar region      "Pituitary macroadenoma (H)     Rosacea     Chronic lower back pain     Spindle cell sarcoma (H)     Mesothelioma, malignant (H)     Vocal fold paralysis, left     Dysphonia     Muscle tension dysphonia     Mediastinal lymphadenopathy     Inguinal hernia     Chemotherapy-induced nausea     Hypophosphatemia     Anemia     Chemotherapy-induced neutropenia (H)     Sarcoma (H)     Encounter for other specified aftercare     Thrush     Anemia in neoplastic disease     Secondary adrenal insufficiency (H)     History of pituitary surgery     Current Medications    Current Outpatient Medications:      cholecalciferol 25 MCG (1000 UT) TABS, Take 1,000 Units by mouth daily , Disp: , Rfl:      clindamycin (CLEOCIN) 300 MG capsule, Take 1 capsule (300 mg) by mouth 3 times daily for 7 days, Disp: 21 capsule, Rfl: 0     clotrimazole (MYCELEX) 10 MG lozenge, Place 1 lozenge (10 mg) inside cheek 5 times daily, Disp: 90 lozenge, Rfl: 1     escitalopram (LEXAPRO) 10 MG tablet, Take 1 tablet (10 mg) by mouth daily, Disp: 30 tablet, Rfl: 3     fluconazole (DIFLUCAN) 200 MG tablet, Take 1 tablet (200 mg) by mouth daily for 7 days Can start 2-3 days prior to chemotherapy, Disp: 7 tablet, Rfl: 1     guaiFENesin-codeine (GUAIFENESIN AC) 100-10 MG/5ML syrup, Take 10 mLs by mouth every 4 hours as needed for cough (Patient taking differently: Take 10 mLs by mouth every 4 hours as needed for cough prn), Disp: 118 mL, Rfl: 0     hydrocortisone (CORTEF) 10 MG tablet, Take 20 mg in the morning and 10 mg in the afternoon, Disp: 270 tablet, Rfl: 3     Insulin Syringe-Needle U-100 27.5G X 5/8\" 2 ML MISC, 1 each daily as needed (with solucortef for adrenal crisis), Disp: 10 each, Rfl: 1     levothyroxine (SYNTHROID/LEVOTHROID) 75 MCG tablet, Take 1 tablet (75 mcg) by mouth every morning, Disp: 90 tablet, Rfl: 3     Menthol-Methyl Salicylate (DALIA MALIK GREASELESS) cream, Apply topically 2 times daily, Disp: , Rfl:      metroNIDAZOLE (METROGEL) 1 % " external gel, Apply topically daily .Try stronger dose due to increased symptoms after chemo., Disp: 30 g, Rfl: 0     nystatin (MYCOSTATIN) 003471 UNIT/ML suspension, Take 5 mLs (500,000 Units) by mouth 4 times daily for 7 days, Disp: 140 mL, Rfl: 0     omeprazole (PRILOSEC) 20 MG DR capsule, Take 2 capsules (40 mg) by mouth daily, Disp: 60 capsule, Rfl: 1     ondansetron (ZOFRAN) 4 MG tablet, Take 1-2 tablets (4-8 mg) by mouth every 8 hours as needed for nausea, Disp: 60 tablet, Rfl: 3     phosphorus tablet 250 mg (PHOSPHA 250 NEUTRAL) 250 MG per tablet, Take 1 tablet (250 mg) by mouth 2 times daily, Disp: 50 tablet, Rfl: 0     potassium chloride ER (KLOR-CON M) 20 MEQ CR tablet, Take 1 tablet (20 mEq) by mouth daily, Disp: 30 tablet, Rfl: 1     prochlorperazine (COMPAZINE) 5 MG tablet, Take 1 tablet (5 mg) by mouth every 6 hours as needed for nausea or vomiting . Caution: causes sedation. (Patient taking differently: Take 5 mg by mouth every 6 hours as needed for nausea or vomiting . Caution: causes sedation. PRN), Disp: 30 tablet, Rfl: 1     SUMAtriptan (IMITREX) 50 MG tablet, Take 1 tablet (50 mg) by mouth at onset of headache for migraine May repeat in 2 hours. Max 4 tablets/24 hours. (Patient taking differently: Take 50 mg by mouth at onset of headache for migraine May repeat in 2 hours. Max 4 tablets/24 hours. PRN), Disp: 10 tablet, Rfl: 3     triamcinolone (KENALOG) 0.1 % external cream, Apply topically 2 times daily to bothersome skin areas., Disp: 30 g, Rfl: 3    Allergies  Allergies   Allergen Reactions     Penicillins Hives and Swelling              Social History  Social History     Socioeconomic History     Marital status:    Tobacco Use     Smoking status: Never Smoker     Smokeless tobacco: Never Used   Substance and Sexual Activity     Alcohol use: Yes     Comment: rare     Drug use: Never     Social Determinants of Health     Financial Resource Strain: Low Risk      Difficulty of Paying  Living Expenses: Not very hard   Food Insecurity: No Food Insecurity     Worried About Running Out of Food in the Last Year: Never true     Ran Out of Food in the Last Year: Never true   Transportation Needs: No Transportation Needs     Lack of Transportation (Medical): No     Lack of Transportation (Non-Medical): No   Physical Activity: Inactive     Days of Exercise per Week: 0 days     Minutes of Exercise per Session: 0 min   Social Connections: Moderately Integrated     Frequency of Communication with Friends and Family: Twice a week     Frequency of Social Gatherings with Friends and Family: Twice a week     Attends [employer]ology Services: Never     Active Member of Clubs or Organizations: Yes     Attends Club or Organization Meetings: 1 to 4 times per year     Marital Status:    Intimate Partner Violence: Unknown     Fear of Current or Ex-Partner: No     Emotionally Abused: No     Sexually Abused: No       Family History  Family History   Problem Relation Age of Onset     Lupus Mother      ALS Father      Rheumatoid Arthritis Sister        Review of Systems  As per HPI and PMHx, otherwise 10 system review including the head and neck, constitutional, eyes, respiratory, GI, skin, neurologic, lymphatic, endocrine, and allergy systems is negative.    Physical Exam  BP (!) 133/90 (BP Location: Left arm, Patient Position: Chair, Cuff Size: Adult Regular)   Pulse 60   Temp 97  F (36.1  C)   Wt 60.3 kg (133 lb)   BMI 20.83 kg/m    GENERAL: Patient is a pleasant, cooperative 71 year old male in no acute distress.  HEAD: Normocephalic, atraumatic.  Hair and scalp are normal.  EYES: Pupils are equal, round, reactive to light and accommodation.  Extraocular movements are intact.  The sclera nonicteric without injection.  The extraocular structures are normal.  EARS: Normal shape and symmetry.  No tenderness when palpating the mastoid or tragal areas bilaterally.   NOSE: Nares are patent.  Nasal mucosa is pink and  moist.  Nasal septum is midline.  Negative anterior rhinoscopy.  ORAL CAVITY: Dentition is in good repair.  Mucous membranes are moist.  Tongue is mobile, protrudes to the midline.  Palate elevates symmetrically.  No erythema or exudate.  No oral cavity or oropharyngeal masses, lesions, ulcerations, leukoplakia.  NECK: Supple, trachea is midline.  There no palpable cervical lymphadenopathy or masses bilaterally.  Palpation of the bilateral parotid and submandibular areas reveal no masses.  No thyromegaly.    NEUROLOGIC: Cranial nerves II through XII are grossly intact.  The patients voice is hoarse and raspy.  Patient does show some signs of right hemifacial spasm.  He also has some weakness in the facial nerve on the right-hand side.  The patient is House-Brackmann III/VI on the right for some brow weakness, mid facial weakness, and marginal division weakness.  Patient is House-Brackmann I/VI.  CARDIOVASCULAR: Extremities are warm and well-perfused.  No significant peripheral edema.  RESPIRATORY: Patient has nonlabored breathing without cough, wheeze, stridor.  PSYCHIATRIC: Patient is alert and oriented.  Mood and affect appear normal.  SKIN: Warm and dry.  No scalp, face, or neck lesions noted.     Procedure: Flexible Laryngoscopy  Indication: Dysphonia     To best visualize the upper airway anatomy and due to the chief complaint and HPI, I proceeded with flexible fiberoptic laryngoscopy examination.  The bilateral nasal cavities were anesthetized and decongested with a mixture of lidocaine and neosynephrine.  The bilateral nasal cavities were examined using a flexible fiberoptic laryngoscope.  There were no nasal cavity masses, polyps, or mucopurulence bilaterally.  The nasal septum is midline.  The nasopharynx had a normal appearance with normal Eustachian tube openings and fossa of Rosenmuller bilaterally.  Minimal adenoid tissue.  The base of tongue, vallecula, epiglottis, aryepiglottic folds, arytenoids, and  piriform sinuses were without mass or lesion.  There is a moderate amount of interarytenoid thickening and a mild amount of erythema.  The right true vocal fold has excellent abduction adduction, the left true vocal fold is in a fixed, paramedian position with good medialization.  The bilateral true vocal folds were without nodules or masses.  There was  mild supraglottic phonation/squeeze.  The visualized portions of the infraglottic and subglottic airway are unremarkable.  There is no significant erythema or signs of oropharyngeal candidiasis on examination today.  The scope was removed.  The patient tolerated the procedure well.                    Assessment and Plan     ICD-10-CM    1. Thrush  B37.0 Otolaryngology Referral     fluconazole (DIFLUCAN) 200 MG tablet     clotrimazole (MYCELEX) 10 MG lozenge   2. Oropharyngeal candidiasis  B37.0 fluconazole (DIFLUCAN) 200 MG tablet     clotrimazole (MYCELEX) 10 MG lozenge   3. Dysphonia  R49.0 fluconazole (DIFLUCAN) 200 MG tablet     clotrimazole (MYCELEX) 10 MG lozenge   4. Vocal fold paralysis, left  J38.01 fluconazole (DIFLUCAN) 200 MG tablet     clotrimazole (MYCELEX) 10 MG lozenge   5. Muscle tension dysphonia  R49.0 fluconazole (DIFLUCAN) 200 MG tablet     clotrimazole (MYCELEX) 10 MG lozenge   6. Spindle cell sarcoma (H)  C49.9 fluconazole (DIFLUCAN) 200 MG tablet     clotrimazole (MYCELEX) 10 MG lozenge      It was my pleasure seeing Ifrah Huitron today in clinic.  On exam today, he does not have any obvious signs of thrush.  He does have some signs of mucositis within his mouth specifically.  He is currently on clindamycin, which I think will have him stop.  We discussed transitioning to some oral fluconazole and some Mycelex atrocious.  He might have to start fluconazole a few days prior to getting chemo if this continues to be a recurrent problem.    From a voice standpoint, his voice has had excellent improvement after speech therapy and his vocal fold  injection.  I would recommend observation in this regard.    If the mucositis continues to worsen, we might want to consider some dexamethasone mouth rinses which would increase his risk of candidiasis but might help with some of the inflammation.  We will try the Mycelex and the fluconazole first and see how he does in this regard.    Ifrah to follow up with Primary Care provider regarding elevated blood pressure.    Kyree Bearden MD  Department of Otolaryngology-Head and Neck Surgery  [facility]         Again, thank you for allowing me to participate in the care of your patient.        Sincerely,        Kyree Bearden MD

## 2022-06-27 NOTE — TELEPHONE ENCOUNTER
Called and left message for patient to return call. Please assist patient with a 6 months lab appointment and then a follow up appointment with Dr. Tao a week after.

## 2022-06-27 NOTE — NURSING NOTE
"Initial BP (!) 133/90 (BP Location: Left arm, Patient Position: Chair, Cuff Size: Adult Regular)   Pulse 60   Temp 97  F (36.1  C)   Wt 60.3 kg (133 lb)   BMI 20.83 kg/m   Estimated body mass index is 20.83 kg/m  as calculated from the following:    Height as of 4/26/22: 1.702 m (5' 7\").    Weight as of this encounter: 60.3 kg (133 lb). .    Therese Huitron LPN    "

## 2022-06-28 NOTE — PROGRESS NOTES
Infusion Nursing Note:  Ifrah Huitron presents today for IVF.    Patient seen by provider today: No   present during visit today: Not Applicable.    Note: Patient states he feels he needs IVF today.    Intravenous Access:  Implanted Port.    Treatment Conditions:  Lab Results   Component Value Date    HGB 10.2 (L) 06/28/2022    WBC 4.0 06/28/2022    ANEU 3.3 04/22/2022    ANEUTAUTO 2.5 06/28/2022     06/28/2022      Lab Results   Component Value Date     06/28/2022    POTASSIUM 3.5 06/28/2022    MAG 2.3 06/28/2022    CR 1.12 06/28/2022    COTY 9.1 06/28/2022    BILITOTAL 0.4 06/28/2022    ALBUMIN 3.2 (L) 06/28/2022    ALT 20 06/28/2022    AST 13 06/28/2022     Results reviewed, labs did NOT meet treatment parameters: No replacement indicated.    Post Infusion Assessment:  Patient tolerated infusion without incident.  Blood return noted pre and post infusion.  No evidence of extravasations.  Access discontinued per protocol.     Discharge Plan:   Discharge instructions reviewed with: Patient.  Patient and/or family verbalized understanding of discharge instructions and all questions answered.  AVS to patient via [employer] Messenger[employer].  Patient will return 7/8/2022 for next appointment.   Patient discharged in stable condition accompanied by: self.  Departure Mode: Ambulatory.    Erin Lowe RN

## 2022-06-29 NOTE — TELEPHONE ENCOUNTER
Called ptnaresht scheduled with Dr. KENDALL. He will get labs done a week prior when he goes in for his weekly labs in Dec.

## 2022-07-08 NOTE — PROGRESS NOTES
Infusion Nursing Note:  Ifrah Huitron presents today for IVF.    Patient seen by provider today: No   present during visit today: Not Applicable.    Note: N/A.    Intravenous Access:  Implanted Port.    Treatment Conditions:  Lab Results   Component Value Date     07/08/2022    POTASSIUM 3.9 07/08/2022    MAG 2.2 07/08/2022    CR 1.24 07/08/2022    COTY 9.3 07/08/2022    BILITOTAL 0.2 07/08/2022    ALBUMIN 3.1 (L) 07/08/2022    ALT 28 07/08/2022    AST 21 07/08/2022     Results reviewed, labs MET treatment parameters, ok to proceed with treatment.    Post Infusion Assessment:  Patient tolerated infusion without incident.  Blood return noted pre and post infusion.  Site patent and intact, free from redness, edema or discomfort.  No evidence of extravasations.  Access discontinued per protocol.     Discharge Plan:   Copy of AVS reviewed with patient and/or family.  Patient will return 7/12/22 for next appointment.  Patient discharged in stable condition accompanied by: self.  Departure Mode: Ambulatory.      Suzette Riley RN

## 2022-07-08 NOTE — PROGRESS NOTES
PAC labs drawn without difficulty via site protocol. Patient tolerated well.    Suzette Riley RN on 7/8/2022 at 4:06 PM

## 2022-07-11 NOTE — LETTER
7/11/2022         RE: Ifrah Huitron  [id number] [address] [facility] [address]        Dear Colleague,    Thank you for referring your patient, Ifrah Huitron, to the [facility]. Please see a copy of my visit note below.    Ifrah is a 72 year old who is being evaluated via a billable video visit.      How would you like to obtain your AVS? MyChart  If the video visit is dropped, the invitation should be resent by: Send to e-mail at: [email or web address]  Will anyone else be joining your video visit? No     Amanda Cathy RODRIGUEZ          Video-Visit Details    Video Start Time: 2:40pm    Type of service:  Video Visit    Video End Time: 3:00pm    Originating Location (pt. Location): Home    Distant Location (provider location):  [facility]     Platform used for Video Visit: Salonmeister    Oncology/Hematology Visit Note  Jul 11, 2022    Reason for Visit: Follow up of spindle cell sarcoma     History of Present Illness: Ifrah Huitron is a 71 year old male with PMH rosacea, pituitary macroadenoma s/p resection, GERD, kidney stones, DJD with metastatic spindle cell sarcoma. He presented to his PCP March 2021 with chest pain/left shoulder and back pain that led to imaging which revealed multiple lung mets. There were difficulties in getting diagnostic biopsy, eventually biopsy of mediastinal mass with spindle cell sarcoma. There was high PD-L1 expression (90%). Baseline imaging 6/21/21 with progression of lung mets and left-sided subdiaphragmatic mass, stable liver lesions. 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      He started Keytruda 6/21/21. CT 8/2/21 with positive response to treatment. CT 10/18/21 with mixed response- LUQ mass larger, anterior mediastinal and left anterior pleural mass smaller. Keytruda stopped.     Started on Doxil + Ifosfamide 10/26/21. Poorly tolerated with  mucositis, nausea, poor oral intake. CT with stable to positive response.      Received C2 12/1/21 (delayed for tolerance issues) with 10% dose reduction.     Received C3 1/4/22 with same 10% dose reduction. He had issues with nausea inpatient along with recurrent thrush.      Received C4 2/2/22 with same 10% dose reduction. Had more significant neurotoxicity so only received 5.5 days. Symptoms resolved after stopping Ifosfamide.      CT 3/8/22 with positive response to treatment.      He is now on Doxil alone but has been dealing with worsening mouth and skin toxicity and has required delays and dose reductions.    Interval History:  Ifrah is feeling well. His mouth pain was much improved this last cycle, doing fluconazole and clotrimazole lozenges per ENT. He had less skin irritation as well. He does get occasional left mid to low back pain but hasn't needed any consistent pain medication. He denies any GI upset, rarely needing Zofran. Mood is bright. He is fatigued and has to take naps. Rare WALKER and cough, overall breathing is improved. Taste/appetite still not great and his weight is lower overall but he is working on eating and drinking. Wondering about medical marijuana.     Review of Systems:  Patient denies fevers, chills, night sweats, unexplained weight changes, headaches, dizziness, vision or hearing changes, new lumps or bumps, chest pain, shortness of breath, cough, abdominal pain, nausea, vomiting, changes to bowel or bladder, swelling of extremities, bleeding issues, or rash.    Current Outpatient Medications   Medication Sig Dispense Refill     cholecalciferol 25 MCG (1000 UT) TABS Take 1,000 Units by mouth daily        clotrimazole (MYCELEX) 10 MG lozenge Place 1 lozenge (10 mg) inside cheek 5 times daily 90 lozenge 1     escitalopram (LEXAPRO) 10 MG tablet Take 1 tablet (10 mg) by mouth daily 30 tablet 3     guaiFENesin-codeine (GUAIFENESIN AC) 100-10 MG/5ML syrup Take 10 mLs by mouth every 4 hours  "as needed for cough (Patient taking differently: Take 10 mLs by mouth every 4 hours as needed for cough prn) 118 mL 0     hydrocortisone (CORTEF) 10 MG tablet Take 20 mg in the morning and 10 mg in the afternoon 270 tablet 3     Insulin Syringe-Needle U-100 27.5G X 5/8\" 2 ML MISC 1 each daily as needed (with solucortef for adrenal crisis) 10 each 1     levothyroxine (SYNTHROID/LEVOTHROID) 75 MCG tablet Take 1 tablet (75 mcg) by mouth every morning 90 tablet 3     Menthol-Methyl Salicylate (DALIA MALIK GREASELESS) cream Apply topically 2 times daily       metroNIDAZOLE (METROGEL) 1 % external gel Apply topically daily .Try stronger dose due to increased symptoms after chemo. 30 g 0     omeprazole (PRILOSEC) 20 MG DR capsule Take 2 capsules (40 mg) by mouth daily 60 capsule 1     ondansetron (ZOFRAN) 4 MG tablet Take 1-2 tablets (4-8 mg) by mouth every 8 hours as needed for nausea 60 tablet 3     phosphorus tablet 250 mg (PHOSPHA 250 NEUTRAL) 250 MG per tablet Take 1 tablet (250 mg) by mouth 2 times daily 50 tablet 0     potassium chloride ER (KLOR-CON M) 20 MEQ CR tablet Take 1 tablet (20 mEq) by mouth daily 30 tablet 1     prochlorperazine (COMPAZINE) 5 MG tablet Take 1 tablet (5 mg) by mouth every 6 hours as needed for nausea or vomiting . Caution: causes sedation. (Patient taking differently: Take 5 mg by mouth every 6 hours as needed for nausea or vomiting . Caution: causes sedation. PRN) 30 tablet 1     SUMAtriptan (IMITREX) 50 MG tablet Take 1 tablet (50 mg) by mouth at onset of headache for migraine May repeat in 2 hours. Max 4 tablets/24 hours. (Patient taking differently: Take 50 mg by mouth at onset of headache for migraine May repeat in 2 hours. Max 4 tablets/24 hours. PRN) 10 tablet 3     triamcinolone (KENALOG) 0.1 % external cream Apply topically 2 times daily to bothersome skin areas. 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     Spindle cell sarcoma (H) 5/30/2021     Thyroid disease     removed pituitary gland     Past Surgical History:   Procedure Laterality Date     COLONOSCOPY N/A 12/17/2020    Procedure: COLONOSCOPY;  Surgeon: Ken Camacho MD;  Location: [facility]     ENT SURGERY       HERNIA REPAIR       INSERT PORT VASCULAR ACCESS Right 1/28/2022    Procedure: INSERTION, VASCULAR ACCESS PORT;  Surgeon: Daniel Kinney MD;  Location: [facility] OR     IR CHEST PORT PLACEMENT > 5 YRS OF AGE  1/28/2022     LARYNGOSCOPY, EXCISE VOCAL CORD LESION MICROSCOPIC, COMBINED Left 07/01/2021    Procedure: MICROLARYNGOSCOPY, LEFT TRUE VOCAL CORD INJECTION WITH PROLARYN;  Surgeon: Kyree Bearden MD;  Location: [facility]     PHACOEMULSIFICATION WITH STANDARD INTRAOCULAR LENS IMPLANT Right 03/10/2021    Procedure: Cataract removal with implant.;  Surgeon: Jamir Mac MD;  Location: [facility]     PHACOEMULSIFICATION WITH STANDARD INTRAOCULAR LENS IMPLANT Left 04/05/2021    Procedure: Cataract removal with implant.;  Surgeon: Jamir Mac MD;  Location: [facility]     PICC DOUBLE LUMEN PLACEMENT Right 12/01/2021    5FR DL PICC, basilic vein. L-38cm, 1cm out.     PICC DOUBLE LUMEN PLACEMENT Right 01/04/2022    Right cephalic, 41 cm, 1 external length     PITUITARY EXCISION       tooth pulled 4/7  Right      Allergies   Allergen Reactions     Penicillins Hives and Swelling            Social History   Social History     Tobacco Use     Smoking status: Never Smoker     Smokeless tobacco: Never Used   Substance Use Topics     Alcohol use: Yes     Comment: rare     Drug use: Never      Past medical history and social history were reviewed.    Physical Examination:  There were no vitals taken for this visit.  Wt Readings from Last 10 Encounters:   06/27/22 60.3 kg (133 lb)   06/09/22 62.1 kg (137 lb)   04/26/22 64 kg (141 lb 3.2 oz)   04/07/22 64.7 kg (142 lb 9.6 oz)   03/25/22 66.2 kg (146 lb)   03/10/22 66.2 kg (146 lb)   02/08/22 63 kg  (138 lb 14.4 oz)   01/28/22 65.8 kg (145 lb)   01/11/22 65.1 kg (143 lb 8 oz)   12/09/21 66.7 kg (147 lb)     Video physical exam  General: Patient appears well in no acute distress.   Skin: No visualized rash or lesions on visualized skin  Eyes: EOMI, no erythema, sclera icterus or discharge noted  Resp: Appears to be breathing comfortably without accessory muscle usage, speaking in full sentences, no cough  MSK: Appears to have normal range of motion based on visualized movements  Neurologic: No apparent tremors, facial movements symmetric  Psych: affect normal, alert and oriented    The rest of a comprehensive physical examination is deferred due to PHE (public health emergency) video restrictions    Laboratory Data:   Latest Reference Range & Units 07/08/22 14:51   Sodium 133 - 144 mmol/L 139   Potassium 3.4 - 5.3 mmol/L 3.9   Chloride 94 - 109 mmol/L 108   Carbon Dioxide 20 - 32 mmol/L 25   Urea Nitrogen 7 - 30 mg/dL 18   Creatinine 0.66 - 1.25 mg/dL 1.24   GFR Estimate >60 mL/min/1.73m2 61   Calcium 8.5 - 10.1 mg/dL 9.3   Anion Gap 3 - 14 mmol/L 6   Magnesium 1.6 - 2.3 mg/dL 2.2   Phosphorus 2.5 - 4.5 mg/dL 2.1 (L)   Albumin 3.4 - 5.0 g/dL 3.1 (L)   Protein Total 6.8 - 8.8 g/dL 6.9   Alkaline Phosphatase 40 - 150 U/L 152 (H)   ALT 0 - 70 U/L 28   AST 0 - 45 U/L 21   Bilirubin Total 0.2 - 1.3 mg/dL 0.2   Glucose 70 - 99 mg/dL 135 (H)   WBC 4.0 - 11.0 10e3/uL 7.6   Hemoglobin 13.3 - 17.7 g/dL 10.3 (L)   Hematocrit 40.0 - 53.0 % 33.6 (L)   Platelet Count 150 - 450 10e3/uL 231   RBC Count 4.40 - 5.90 10e6/uL 3.47 (L)   MCV 78 - 100 fL 97   MCH 26.5 - 33.0 pg 29.7   MCHC 31.5 - 36.5 g/dL 30.7 (L)   RDW 10.0 - 15.0 % 14.6   % Neutrophils % 64   % Lymphocytes % 20   % Monocytes % 13   % Eosinophils % 0   % Basophils % 1   Absolute Basophils 0.0 - 0.2 10e3/uL 0.1   Absolute Eosinophils 0.0 - 0.7 10e3/uL 0.0   Absolute Immature Granulocytes <=0.4 10e3/uL 0.1   Absolute Lymphocytes 0.8 - 5.3 10e3/uL 1.5   Absolute  Monocytes 0.0 - 1.3 10e3/uL 1.0   % Immature Granulocytes % 2   Absolute Neutrophils 1.6 - 8.3 10e3/uL 4.9   Absolute NRBCs 10e3/uL 0.0   NRBCs per 100 WBC <1 /100 0   (L): Data is abnormally low  (H): Data is abnormally high      Assessment and Plan:  1. 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 High PD-L1. Was on Keytruda but had progression, started Doxil + Ifos started 10/26/21. Port placed 1/28/22.     He completed 4 cycles of Doxil + Ifos, has had multiple tolerance issues including nausea/vomiting, dehydration, neurotoxicity, mucositis, skin toxicity, pancytopenia, hypokalemia, hypophosphatemia. CT with positive response.      Now on Doxil alone- had tolerance issues with signficant mucositis/thrush, skin sores, weakness, dehydration, HUSSEIN. S/p break in May 2022 and dose reduction to 55mg.    Doing much better at current dose. Continue with treatment Doxil 55mg tomorrow in [place].    Continue weekly labs and IVF. CT in 4 weeks, see MARLEEN to review since MD will be out.    2. GI  Dyspepsia: Continue Omeprazole-can cut back to 20mg daily. Continue Tums PRN     CINV: Zofran PRN.      3. 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 Tao who is having him double up his hydrocortisone with treatment which should help fatigue      Hypothyroidism continue Synthroid 75mcg daily (6.5 tablets per week per Dr. Tao). If fatigue persists would have endo discuss thyroid dosing.       4. Derm  Rosacea: Long standing issue, continue Metrogel PRN     Hand/foot: 2/2 Doxil, icing hands and feet. Continue emollients at home. Kenalog cream for bothersome/blistering and red areas. Improved with dose reduction.      5. MSK  Left shoulder/back/chest wall pain 2/2 tumor, following with palliative. Minimal pain currently, off all pain medications.  Tylenol PRN.     6. ENT  Hoarseness: Thought 2/2 mediastinal mass vs irritation from prior biopsy. Following  with ENT, s/p vocal cord injection 7/1/21. Following with voice clinic.      Mucositis/Thrush: Has been a significant issue with treatment. Saw ENT, now doing Fluconazole starting the day before treatment and PRN clotrimazole lozenges + dose reduction last cycle and symptoms improved. Continue to ice mouth with treatment.      7. Pulm/Cards  Working with speech on laryngreal dysfunction.     SOB improved. CT stable. Echo stable.      Continue Guaifenesin-Coedine PRN for cough, much improved.       Off statin due to LFT elevation, improved.      8. FEN  Hypokalemia: Recurrent issue, continue 20meq daily.      Hypophosphatemia: Continue 250mg phos daily for recurrent issues.      Renal insufficiency: Doing better, creat WNL now. Continue weekly IVF on treatment.     Will refer to medical cannabis program for poor appetite related to malignancy/treatment.      9. Psych/Neuro  Anxiety/depression: Improved, on Lexapro and feels like it is helping. Continue.      Continue PRN Imitrex for migraines.      10. Heme  Pancytopenia 2/2 chemotherapy. Much improved on Doxil alone.    45 minutes spent on the date of the encounter doing chart review, review of test results, interpretation of tests, patient visit and documentation     Maynor Rios PA-C  Department of Hematology and Oncology  [facility]         Again, thank you for allowing me to participate in the care of your patient.        Sincerely,        GERALDO Mullins

## 2022-07-11 NOTE — LETTER
7/11/2022         RE: Ifrah Huitron  [address] [address] [facility] [address]        Dear Colleague,    Thank you for referring your patient, Ifrah Huitron, to the [facility]. Please see a copy of my visit note below.    Ifrah is a 72 year old who is being evaluated via a billable video visit.      How would you like to obtain your AVS? MyChart  If the video visit is dropped, the invitation should be resent by: Send to e-mail at: [email or web address]  Will anyone else be joining your video visit? No     Amanda Cathy RODRIGUEZ          Video-Visit Details    Video Start Time: 2:40pm    Type of service:  Video Visit    Video End Time: 3:00pm    Originating Location (pt. Location): Home    Distant Location (provider location):  [facility]     Platform used for Video Visit: Picklify    Oncology/Hematology Visit Note  Jul 11, 2022    Reason for Visit: Follow up of spindle cell sarcoma     History of Present Illness: Ifrah Huitron is a 71 year old male with PMH rosacea, pituitary macroadenoma s/p resection, GERD, kidney stones, DJD with metastatic spindle cell sarcoma. He presented to his PCP March 2021 with chest pain/left shoulder and back pain that led to imaging which revealed multiple lung mets. There were difficulties in getting diagnostic biopsy, eventually biopsy of mediastinal mass with spindle cell sarcoma. There was high PD-L1 expression (90%). Baseline imaging 6/21/21 with progression of lung mets and left-sided subdiaphragmatic mass, stable liver lesions. 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      He started Keytruda 6/21/21. CT 8/2/21 with positive response to treatment. CT 10/18/21 with mixed response- LUQ mass larger, anterior mediastinal and left anterior pleural mass smaller. Keytruda stopped.     Started on Doxil + Ifosfamide 10/26/21. Poorly tolerated with  mucositis, nausea, poor oral intake. CT with stable to positive response.      Received C2 12/1/21 (delayed for tolerance issues) with 10% dose reduction.     Received C3 1/4/22 with same 10% dose reduction. He had issues with nausea inpatient along with recurrent thrush.      Received C4 2/2/22 with same 10% dose reduction. Had more significant neurotoxicity so only received 5.5 days. Symptoms resolved after stopping Ifosfamide.      CT 3/8/22 with positive response to treatment.      He is now on Doxil alone but has been dealing with worsening mouth and skin toxicity and has required delays and dose reductions.    Interval History:  Ifrah is feeling well. His mouth pain was much improved this last cycle, doing fluconazole and clotrimazole lozenges per ENT. He had less skin irritation as well. He does get occasional left mid to low back pain but hasn't needed any consistent pain medication. He denies any GI upset, rarely needing Zofran. Mood is bright. He is fatigued and has to take naps. Rare WALKER and cough, overall breathing is improved. Taste/appetite still not great and his weight is lower overall but he is working on eating and drinking. Wondering about medical marijuana.     Review of Systems:  Patient denies fevers, chills, night sweats, unexplained weight changes, headaches, dizziness, vision or hearing changes, new lumps or bumps, chest pain, shortness of breath, cough, abdominal pain, nausea, vomiting, changes to bowel or bladder, swelling of extremities, bleeding issues, or rash.    Current Outpatient Medications   Medication Sig Dispense Refill     cholecalciferol 25 MCG (1000 UT) TABS Take 1,000 Units by mouth daily        clotrimazole (MYCELEX) 10 MG lozenge Place 1 lozenge (10 mg) inside cheek 5 times daily 90 lozenge 1     escitalopram (LEXAPRO) 10 MG tablet Take 1 tablet (10 mg) by mouth daily 30 tablet 3     guaiFENesin-codeine (GUAIFENESIN AC) 100-10 MG/5ML syrup Take 10 mLs by mouth every 4 hours  "as needed for cough (Patient taking differently: Take 10 mLs by mouth every 4 hours as needed for cough prn) 118 mL 0     hydrocortisone (CORTEF) 10 MG tablet Take 20 mg in the morning and 10 mg in the afternoon 270 tablet 3     Insulin Syringe-Needle U-100 27.5G X 5/8\" 2 ML MISC 1 each daily as needed (with solucortef for adrenal crisis) 10 each 1     levothyroxine (SYNTHROID/LEVOTHROID) 75 MCG tablet Take 1 tablet (75 mcg) by mouth every morning 90 tablet 3     Menthol-Methyl Salicylate (DALIA MALIK GREASELESS) cream Apply topically 2 times daily       metroNIDAZOLE (METROGEL) 1 % external gel Apply topically daily .Try stronger dose due to increased symptoms after chemo. 30 g 0     omeprazole (PRILOSEC) 20 MG DR capsule Take 2 capsules (40 mg) by mouth daily 60 capsule 1     ondansetron (ZOFRAN) 4 MG tablet Take 1-2 tablets (4-8 mg) by mouth every 8 hours as needed for nausea 60 tablet 3     phosphorus tablet 250 mg (PHOSPHA 250 NEUTRAL) 250 MG per tablet Take 1 tablet (250 mg) by mouth 2 times daily 50 tablet 0     potassium chloride ER (KLOR-CON M) 20 MEQ CR tablet Take 1 tablet (20 mEq) by mouth daily 30 tablet 1     prochlorperazine (COMPAZINE) 5 MG tablet Take 1 tablet (5 mg) by mouth every 6 hours as needed for nausea or vomiting . Caution: causes sedation. (Patient taking differently: Take 5 mg by mouth every 6 hours as needed for nausea or vomiting . Caution: causes sedation. PRN) 30 tablet 1     SUMAtriptan (IMITREX) 50 MG tablet Take 1 tablet (50 mg) by mouth at onset of headache for migraine May repeat in 2 hours. Max 4 tablets/24 hours. (Patient taking differently: Take 50 mg by mouth at onset of headache for migraine May repeat in 2 hours. Max 4 tablets/24 hours. PRN) 10 tablet 3     triamcinolone (KENALOG) 0.1 % external cream Apply topically 2 times daily to bothersome skin areas. 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     Spindle cell sarcoma (H) 5/30/2021     Thyroid disease     removed pituitary gland     Past Surgical History:   Procedure Laterality Date     COLONOSCOPY N/A 12/17/2020    Procedure: COLONOSCOPY;  Surgeon: Ken Camacho MD;  Location: [facility]     ENT SURGERY       HERNIA REPAIR       INSERT PORT VASCULAR ACCESS Right 1/28/2022    Procedure: INSERTION, VASCULAR ACCESS PORT;  Surgeon: Daniel Kinney MD;  Location: [facility] OR     IR CHEST PORT PLACEMENT > 5 YRS OF AGE  1/28/2022     LARYNGOSCOPY, EXCISE VOCAL CORD LESION MICROSCOPIC, COMBINED Left 07/01/2021    Procedure: MICROLARYNGOSCOPY, LEFT TRUE VOCAL CORD INJECTION WITH PROLARYN;  Surgeon: Kyree Bearden MD;  Location: [facility]     PHACOEMULSIFICATION WITH STANDARD INTRAOCULAR LENS IMPLANT Right 03/10/2021    Procedure: Cataract removal with implant.;  Surgeon: aJmir Mac MD;  Location: [facility]     PHACOEMULSIFICATION WITH STANDARD INTRAOCULAR LENS IMPLANT Left 04/05/2021    Procedure: Cataract removal with implant.;  Surgeon: Jamir Mac MD;  Location: [facility]     PICC DOUBLE LUMEN PLACEMENT Right 12/01/2021    5FR DL PICC, basilic vein. L-38cm, 1cm out.     PICC DOUBLE LUMEN PLACEMENT Right 01/04/2022    Right cephalic, 41 cm, 1 external length     PITUITARY EXCISION       tooth pulled 4/7  Right      Allergies   Allergen Reactions     Penicillins Hives and Swelling            Social History   Social History     Tobacco Use     Smoking status: Never Smoker     Smokeless tobacco: Never Used   Substance Use Topics     Alcohol use: Yes     Comment: rare     Drug use: Never      Past medical history and social history were reviewed.    Physical Examination:  There were no vitals taken for this visit.  Wt Readings from Last 10 Encounters:   06/27/22 60.3 kg (133 lb)   06/09/22 62.1 kg (137 lb)   04/26/22 64 kg (141 lb 3.2 oz)   04/07/22 64.7 kg (142 lb 9.6 oz)   03/25/22 66.2 kg (146 lb)   03/10/22 66.2 kg (146 lb)   02/08/22 63 kg  (138 lb 14.4 oz)   01/28/22 65.8 kg (145 lb)   01/11/22 65.1 kg (143 lb 8 oz)   12/09/21 66.7 kg (147 lb)     Video physical exam  General: Patient appears well in no acute distress.   Skin: No visualized rash or lesions on visualized skin  Eyes: EOMI, no erythema, sclera icterus or discharge noted  Resp: Appears to be breathing comfortably without accessory muscle usage, speaking in full sentences, no cough  MSK: Appears to have normal range of motion based on visualized movements  Neurologic: No apparent tremors, facial movements symmetric  Psych: affect normal, alert and oriented    The rest of a comprehensive physical examination is deferred due to PHE (public health emergency) video restrictions    Laboratory Data:   Latest Reference Range & Units 07/08/22 14:51   Sodium 133 - 144 mmol/L 139   Potassium 3.4 - 5.3 mmol/L 3.9   Chloride 94 - 109 mmol/L 108   Carbon Dioxide 20 - 32 mmol/L 25   Urea Nitrogen 7 - 30 mg/dL 18   Creatinine 0.66 - 1.25 mg/dL 1.24   GFR Estimate >60 mL/min/1.73m2 61   Calcium 8.5 - 10.1 mg/dL 9.3   Anion Gap 3 - 14 mmol/L 6   Magnesium 1.6 - 2.3 mg/dL 2.2   Phosphorus 2.5 - 4.5 mg/dL 2.1 (L)   Albumin 3.4 - 5.0 g/dL 3.1 (L)   Protein Total 6.8 - 8.8 g/dL 6.9   Alkaline Phosphatase 40 - 150 U/L 152 (H)   ALT 0 - 70 U/L 28   AST 0 - 45 U/L 21   Bilirubin Total 0.2 - 1.3 mg/dL 0.2   Glucose 70 - 99 mg/dL 135 (H)   WBC 4.0 - 11.0 10e3/uL 7.6   Hemoglobin 13.3 - 17.7 g/dL 10.3 (L)   Hematocrit 40.0 - 53.0 % 33.6 (L)   Platelet Count 150 - 450 10e3/uL 231   RBC Count 4.40 - 5.90 10e6/uL 3.47 (L)   MCV 78 - 100 fL 97   MCH 26.5 - 33.0 pg 29.7   MCHC 31.5 - 36.5 g/dL 30.7 (L)   RDW 10.0 - 15.0 % 14.6   % Neutrophils % 64   % Lymphocytes % 20   % Monocytes % 13   % Eosinophils % 0   % Basophils % 1   Absolute Basophils 0.0 - 0.2 10e3/uL 0.1   Absolute Eosinophils 0.0 - 0.7 10e3/uL 0.0   Absolute Immature Granulocytes <=0.4 10e3/uL 0.1   Absolute Lymphocytes 0.8 - 5.3 10e3/uL 1.5   Absolute  Monocytes 0.0 - 1.3 10e3/uL 1.0   % Immature Granulocytes % 2   Absolute Neutrophils 1.6 - 8.3 10e3/uL 4.9   Absolute NRBCs 10e3/uL 0.0   NRBCs per 100 WBC <1 /100 0   (L): Data is abnormally low  (H): Data is abnormally high      Assessment and Plan:  1. 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 High PD-L1. Was on Keytruda but had progression, started Doxil + Ifos started 10/26/21. Port placed 1/28/22.     He completed 4 cycles of Doxil + Ifos, has had multiple tolerance issues including nausea/vomiting, dehydration, neurotoxicity, mucositis, skin toxicity, pancytopenia, hypokalemia, hypophosphatemia. CT with positive response.      Now on Doxil alone- had tolerance issues with signficant mucositis/thrush, skin sores, weakness, dehydration, HUSSEIN. S/p break in May 2022 and dose reduction to 55mg.    Doing much better at current dose. Continue with treatment Doxil 55mg tomorrow in [place].    Continue weekly labs and IVF. CT in 4 weeks, see MARLEEN to review since MD will be out.    2. GI  Dyspepsia: Continue Omeprazole-can cut back to 20mg daily. Continue Tums PRN     CINV: Zofran PRN.      3. 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 Tao who is having him double up his hydrocortisone with treatment which should help fatigue      Hypothyroidism continue Synthroid 75mcg daily (6.5 tablets per week per Dr. Tao). If fatigue persists would have endo discuss thyroid dosing.       4. Derm  Rosacea: Long standing issue, continue Metrogel PRN     Hand/foot: 2/2 Doxil, icing hands and feet. Continue emollients at home. Kenalog cream for bothersome/blistering and red areas. Improved with dose reduction.      5. MSK  Left shoulder/back/chest wall pain 2/2 tumor, following with palliative. Minimal pain currently, off all pain medications.  Tylenol PRN.     6. ENT  Hoarseness: Thought 2/2 mediastinal mass vs irritation from prior biopsy. Following  with ENT, s/p vocal cord injection 7/1/21. Following with voice clinic.      Mucositis/Thrush: Has been a significant issue with treatment. Saw ENT, now doing Fluconazole starting the day before treatment and PRN clotrimazole lozenges + dose reduction last cycle and symptoms improved. Continue to ice mouth with treatment.      7. Pulm/Cards  Working with speech on laryngreal dysfunction.     SOB improved. CT stable. Echo stable.      Continue Guaifenesin-Coedine PRN for cough, much improved.       Off statin due to LFT elevation, improved.      8. FEN  Hypokalemia: Recurrent issue, continue 20meq daily.      Hypophosphatemia: Continue 250mg phos daily for recurrent issues.      Renal insufficiency: Doing better, creat WNL now. Continue weekly IVF on treatment.     Will refer to medical cannabis program for poor appetite related to malignancy/treatment.      9. Psych/Neuro  Anxiety/depression: Improved, on Lexapro and feels like it is helping. Continue.      Continue PRN Imitrex for migraines.      10. Heme  Pancytopenia 2/2 chemotherapy. Much improved on Doxil alone.    45 minutes spent on the date of the encounter doing chart review, review of test results, interpretation of tests, patient visit and documentation     Maynor Rios PA-C  Department of Hematology and Oncology  [facility]         Again, thank you for allowing me to participate in the care of your patient.        Sincerely,        GERALDO Mullins

## 2022-07-11 NOTE — PROGRESS NOTES
Ifrah is a 72 year old who is being evaluated via a billable video visit.      How would you like to obtain your AVS? MyChart  If the video visit is dropped, the invitation should be resent by: Send to e-mail at: [email or web address]  Will anyone else be joining your video visit? No     Amanda Hernandezanthonyaraceli JENNIFER          Video-Visit Details    Video Start Time: 2:40pm    Type of service:  Video Visit    Video End Time: 3:00pm    Originating Location (pt. Location): Home    Distant Location (provider location):  [facility]     Platform used for Video Visit: [facility]    Oncology/Hematology Visit Note  Jul 11, 2022    Reason for Visit: Follow up of spindle cell sarcoma     History of Present Illness: Ifrah Huitron is a 71 year old male with PMH rosacea, pituitary macroadenoma s/p resection, GERD, kidney stones, DJD with metastatic spindle cell sarcoma. He presented to his PCP March 2021 with chest pain/left shoulder and back pain that led to imaging which revealed multiple lung mets. There were difficulties in getting diagnostic biopsy, eventually biopsy of mediastinal mass with spindle cell sarcoma. There was high PD-L1 expression (90%). Baseline imaging 6/21/21 with progression of lung mets and left-sided subdiaphragmatic mass, stable liver lesions. 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      He started Keytruda 6/21/21. CT 8/2/21 with positive response to treatment. CT 10/18/21 with mixed response- LUQ mass larger, anterior mediastinal and left anterior pleural mass smaller. Keytruda stopped.     Started on Doxil + Ifosfamide 10/26/21. Poorly tolerated with mucositis, nausea, poor oral intake. CT with stable to positive response.      Received C2 12/1/21 (delayed for tolerance issues) with 10% dose reduction.     Received C3 1/4/22 with same 10% dose reduction. He had issues with nausea inpatient along with  recurrent thrush.      Received C4 2/2/22 with same 10% dose reduction. Had more significant neurotoxicity so only received 5.5 days. Symptoms resolved after stopping Ifosfamide.      CT 3/8/22 with positive response to treatment.      He is now on Doxil alone but has been dealing with worsening mouth and skin toxicity and has required delays and dose reductions.    Interval History:  Ifrah is feeling well. His mouth pain was much improved this last cycle, doing fluconazole and clotrimazole lozenges per ENT. He had less skin irritation as well. He does get occasional left mid to low back pain but hasn't needed any consistent pain medication. He denies any GI upset, rarely needing Zofran. Mood is bright. He is fatigued and has to take naps. Rare WALKER and cough, overall breathing is improved. Taste/appetite still not great and his weight is lower overall but he is working on eating and drinking. Wondering about medical marijuana.     Review of Systems:  Patient denies fevers, chills, night sweats, unexplained weight changes, headaches, dizziness, vision or hearing changes, new lumps or bumps, chest pain, shortness of breath, cough, abdominal pain, nausea, vomiting, changes to bowel or bladder, swelling of extremities, bleeding issues, or rash.    Current Outpatient Medications   Medication Sig Dispense Refill     cholecalciferol 25 MCG (1000 UT) TABS Take 1,000 Units by mouth daily        clotrimazole (MYCELEX) 10 MG lozenge Place 1 lozenge (10 mg) inside cheek 5 times daily 90 lozenge 1     escitalopram (LEXAPRO) 10 MG tablet Take 1 tablet (10 mg) by mouth daily 30 tablet 3     guaiFENesin-codeine (GUAIFENESIN AC) 100-10 MG/5ML syrup Take 10 mLs by mouth every 4 hours as needed for cough (Patient taking differently: Take 10 mLs by mouth every 4 hours as needed for cough prn) 118 mL 0     hydrocortisone (CORTEF) 10 MG tablet Take 20 mg in the morning and 10 mg in the afternoon 270 tablet 3     Insulin Syringe-Needle  "U-100 27.5G X 5/8\" 2 ML MISC 1 each daily as needed (with solucortef for adrenal crisis) 10 each 1     levothyroxine (SYNTHROID/LEVOTHROID) 75 MCG tablet Take 1 tablet (75 mcg) by mouth every morning 90 tablet 3     Menthol-Methyl Salicylate (DALIA MALIK GREASELESS) cream Apply topically 2 times daily       metroNIDAZOLE (METROGEL) 1 % external gel Apply topically daily .Try stronger dose due to increased symptoms after chemo. 30 g 0     omeprazole (PRILOSEC) 20 MG DR capsule Take 2 capsules (40 mg) by mouth daily 60 capsule 1     ondansetron (ZOFRAN) 4 MG tablet Take 1-2 tablets (4-8 mg) by mouth every 8 hours as needed for nausea 60 tablet 3     phosphorus tablet 250 mg (PHOSPHA 250 NEUTRAL) 250 MG per tablet Take 1 tablet (250 mg) by mouth 2 times daily 50 tablet 0     potassium chloride ER (KLOR-CON M) 20 MEQ CR tablet Take 1 tablet (20 mEq) by mouth daily 30 tablet 1     prochlorperazine (COMPAZINE) 5 MG tablet Take 1 tablet (5 mg) by mouth every 6 hours as needed for nausea or vomiting . Caution: causes sedation. (Patient taking differently: Take 5 mg by mouth every 6 hours as needed for nausea or vomiting . Caution: causes sedation. PRN) 30 tablet 1     SUMAtriptan (IMITREX) 50 MG tablet Take 1 tablet (50 mg) by mouth at onset of headache for migraine May repeat in 2 hours. Max 4 tablets/24 hours. (Patient taking differently: Take 50 mg by mouth at onset of headache for migraine May repeat in 2 hours. Max 4 tablets/24 hours. PRN) 10 tablet 3     triamcinolone (KENALOG) 0.1 % external cream Apply topically 2 times daily to bothersome skin areas. 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     Thyroid disease     removed pituitary gland     Past Surgical History:   Procedure Laterality Date     COLONOSCOPY N/A 12/17/2020    Procedure: COLONOSCOPY;  Surgeon: Ken Camacho MD;  Location: [facility]"     ENT SURGERY       HERNIA REPAIR       INSERT PORT VASCULAR ACCESS Right 1/28/2022    Procedure: INSERTION, VASCULAR ACCESS PORT;  Surgeon: Daniel Kinney MD;  Location: [facility] OR     IR CHEST PORT PLACEMENT > 5 YRS OF AGE  1/28/2022     LARYNGOSCOPY, EXCISE VOCAL CORD LESION MICROSCOPIC, COMBINED Left 07/01/2021    Procedure: MICROLARYNGOSCOPY, LEFT TRUE VOCAL CORD INJECTION WITH PROLARYN;  Surgeon: Kyree Bearden MD;  Location: [facility] OR     PHACOEMULSIFICATION WITH STANDARD INTRAOCULAR LENS IMPLANT Right 03/10/2021    Procedure: Cataract removal with implant.;  Surgeon: Jamir Mac MD;  Location: [facility] OR     PHACOEMULSIFICATION WITH STANDARD INTRAOCULAR LENS IMPLANT Left 04/05/2021    Procedure: Cataract removal with implant.;  Surgeon: Jamir Mac MD;  Location: [facility] OR     PICC DOUBLE LUMEN PLACEMENT Right 12/01/2021    5FR DL PICC, basilic vein. L-38cm, 1cm out.     PICC DOUBLE LUMEN PLACEMENT Right 01/04/2022    Right cephalic, 41 cm, 1 external length     PITUITARY EXCISION       tooth pulled 4/7  Right      Allergies   Allergen Reactions     Penicillins Hives and Swelling            Social History   Social History     Tobacco Use     Smoking status: Never Smoker     Smokeless tobacco: Never Used   Substance Use Topics     Alcohol use: Yes     Comment: rare     Drug use: Never      Past medical history and social history were reviewed.    Physical Examination:  There were no vitals taken for this visit.  Wt Readings from Last 10 Encounters:   06/27/22 60.3 kg (133 lb)   06/09/22 62.1 kg (137 lb)   04/26/22 64 kg (141 lb 3.2 oz)   04/07/22 64.7 kg (142 lb 9.6 oz)   03/25/22 66.2 kg (146 lb)   03/10/22 66.2 kg (146 lb)   02/08/22 63 kg (138 lb 14.4 oz)   01/28/22 65.8 kg (145 lb)   01/11/22 65.1 kg (143 lb 8 oz)   12/09/21 66.7 kg (147 lb)     Video physical exam  General: Patient appears well in no acute distress.   Skin: No visualized rash or lesions on visualized skin  Eyes: EOMI, no  erythema, sclera icterus or discharge noted  Resp: Appears to be breathing comfortably without accessory muscle usage, speaking in full sentences, no cough  MSK: Appears to have normal range of motion based on visualized movements  Neurologic: No apparent tremors, facial movements symmetric  Psych: affect normal, alert and oriented    The rest of a comprehensive physical examination is deferred due to PHE (public health emergency) video restrictions    Laboratory Data:   Latest Reference Range & Units 07/08/22 14:51   Sodium 133 - 144 mmol/L 139   Potassium 3.4 - 5.3 mmol/L 3.9   Chloride 94 - 109 mmol/L 108   Carbon Dioxide 20 - 32 mmol/L 25   Urea Nitrogen 7 - 30 mg/dL 18   Creatinine 0.66 - 1.25 mg/dL 1.24   GFR Estimate >60 mL/min/1.73m2 61   Calcium 8.5 - 10.1 mg/dL 9.3   Anion Gap 3 - 14 mmol/L 6   Magnesium 1.6 - 2.3 mg/dL 2.2   Phosphorus 2.5 - 4.5 mg/dL 2.1 (L)   Albumin 3.4 - 5.0 g/dL 3.1 (L)   Protein Total 6.8 - 8.8 g/dL 6.9   Alkaline Phosphatase 40 - 150 U/L 152 (H)   ALT 0 - 70 U/L 28   AST 0 - 45 U/L 21   Bilirubin Total 0.2 - 1.3 mg/dL 0.2   Glucose 70 - 99 mg/dL 135 (H)   WBC 4.0 - 11.0 10e3/uL 7.6   Hemoglobin 13.3 - 17.7 g/dL 10.3 (L)   Hematocrit 40.0 - 53.0 % 33.6 (L)   Platelet Count 150 - 450 10e3/uL 231   RBC Count 4.40 - 5.90 10e6/uL 3.47 (L)   MCV 78 - 100 fL 97   MCH 26.5 - 33.0 pg 29.7   MCHC 31.5 - 36.5 g/dL 30.7 (L)   RDW 10.0 - 15.0 % 14.6   % Neutrophils % 64   % Lymphocytes % 20   % Monocytes % 13   % Eosinophils % 0   % Basophils % 1   Absolute Basophils 0.0 - 0.2 10e3/uL 0.1   Absolute Eosinophils 0.0 - 0.7 10e3/uL 0.0   Absolute Immature Granulocytes <=0.4 10e3/uL 0.1   Absolute Lymphocytes 0.8 - 5.3 10e3/uL 1.5   Absolute Monocytes 0.0 - 1.3 10e3/uL 1.0   % Immature Granulocytes % 2   Absolute Neutrophils 1.6 - 8.3 10e3/uL 4.9   Absolute NRBCs 10e3/uL 0.0   NRBCs per 100 WBC <1 /100 0   (L): Data is abnormally low  (H): Data is abnormally high      Assessment and Plan:  1.  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 High PD-L1. Was on Keytruda but had progression, started Doxil + Ifos started 10/26/21. Port placed 1/28/22.     He completed 4 cycles of Doxil + Ifos, has had multiple tolerance issues including nausea/vomiting, dehydration, neurotoxicity, mucositis, skin toxicity, pancytopenia, hypokalemia, hypophosphatemia. CT with positive response.      Now on Doxil alone- had tolerance issues with signficant mucositis/thrush, skin sores, weakness, dehydration, HUSSEIN. S/p break in May 2022 and dose reduction to 55mg.    Doing much better at current dose. Continue with treatment Doxil 55mg tomorrow in [place].    Continue weekly labs and IVF. CT in 4 weeks, see MARLEEN to review since MD will be out.    2. GI  Dyspepsia: Continue Omeprazole-can cut back to 20mg daily. Continue Tums PRN     CINV: Zofran PRN.      3. 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 Tao who is having him double up his hydrocortisone with treatment which should help fatigue      Hypothyroidism continue Synthroid 75mcg daily (6.5 tablets per week per Dr. Tao). If fatigue persists would have endo discuss thyroid dosing.       4. Derm  Rosacea: Long standing issue, continue Metrogel PRN     Hand/foot: 2/2 Doxil, icing hands and feet. Continue emollients at home. Kenalog cream for bothersome/blistering and red areas. Improved with dose reduction.      5. MSK  Left shoulder/back/chest wall pain 2/2 tumor, following with palliative. Minimal pain currently, off all pain medications.  Tylenol PRN.     6. ENT  Hoarseness: Thought 2/2 mediastinal mass vs irritation from prior biopsy. Following with ENT, s/p vocal cord injection 7/1/21. Following with voice clinic.      Mucositis/Thrush: Has been a significant issue with treatment. Saw ENT, now doing Fluconazole starting the day before treatment and PRN clotrimazole lozenges + dose reduction last  cycle and symptoms improved. Continue to ice mouth with treatment.      7. Pulm/Cards  Working with speech on laryngreal dysfunction.     SOB improved. CT stable. Echo stable.      Continue Guaifenesin-Coedine PRN for cough, much improved.       Off statin due to LFT elevation, improved.      8. FEN  Hypokalemia: Recurrent issue, continue 20meq daily.      Hypophosphatemia: Continue 250mg phos daily for recurrent issues.      Renal insufficiency: Doing better, creat WNL now. Continue weekly IVF on treatment.     Will refer to medical cannabis program for poor appetite related to malignancy/treatment.      9. Psych/Neuro  Anxiety/depression: Improved, on Lexapro and feels like it is helping. Continue.      Continue PRN Imitrex for migraines.      10. Heme  Pancytopenia 2/2 chemotherapy. Much improved on Doxil alone.    45 minutes spent on the date of the encounter doing chart review, review of test results, interpretation of tests, patient visit and documentation     Maynor Rios PA-C  Department of Hematology and Oncology  [facility]

## 2022-07-12 NOTE — PROGRESS NOTES
Infusion Nursing Note:  Ifrah Huitron presents today for Doxil & IVFs.    Patient seen by provider today: No   present during visit today: Not Applicable.    Note: Labs drawn 7/8.    Intravenous Access:  Implanted Port.    Treatment Conditions:  Lab Results   Component Value Date    HGB 10.3 (L) 07/08/2022    WBC 7.6 07/08/2022    ANEU 3.3 04/22/2022    ANEUTAUTO 4.9 07/08/2022     07/08/2022      Lab Results   Component Value Date     07/08/2022    POTASSIUM 3.9 07/08/2022    MAG 2.2 07/08/2022    CR 1.24 07/08/2022    COTY 9.3 07/08/2022    BILITOTAL 0.2 07/08/2022    ALBUMIN 3.1 (L) 07/08/2022    ALT 28 07/08/2022    AST 21 07/08/2022     Results reviewed, labs MET treatment parameters, ok to proceed with treatment.    Post Infusion Assessment:  Patient tolerated infusion without incident.  Blood return noted pre and post infusion.  Site patent and intact, free from redness, edema or discomfort.  No evidence of extravasations.  Access discontinued per protocol.     Discharge Plan:   Patient discharged in stable condition accompanied by: self.  Departure Mode: Ambulatory.      Warren Santana RN

## 2022-07-12 NOTE — PROGRESS NOTES
Received positive distress screen regarding patient's request to speak to RD.  Called and spoke with patient via phone this afternoon. He states he has been struggling with oral thrush off/on for several months now with chemo. Notes most everything tastes like cardboard and hasn't been eating much. Tolerating noodles, ramen and apples. Has done Boost shakes in the past and states he kind of forgot about them. His spouse used to mixed it with some ice cream and frozen fruit for better flavor. Encouraged patient to start utilizing these homemade shakes again with Boost/Ensure/Fairlife shakes. Recommended utilizing straw to help bypass thrush in mouth. Patient notes he also has been doing oatmeal with some almond milk due to a mild dairy intolerance. Recommended patient try a lactose free milk such as Lactaid or Fairlife to mix with oatmeal and then add in a scoop of peanut butter or protein powder for extra calories and protein. Offered to scheduled nutrition appointment for patient but he politely declines at this time. States he is going to implement these ideas over the next 1-2 weeks and then let clinic know if he would like to make an appointment to go over diet and nutrition recommendations in more detail.     Mary Walters RD, LD  Clinical Dietitian  [facility]  [phone]

## 2022-07-25 NOTE — PROGRESS NOTES
PAC labs drawn via site protocol without difficulty. Patient tolerated well.    Suzette Riley RN on 7/25/2022 at 11:07 AM

## 2022-07-25 NOTE — PROGRESS NOTES
Infusion Nursing Note:  Ifrah Huitron presents today for IVF.    Patient seen by provider today: No   present during visit today: Not Applicable.    Note: Blood pressure has been elevated for him over the past few weeks, advised to reach out to his PCP regarding his BP.    Intravenous Access:  Implanted Port.    Treatment Conditions:  Lab Results   Component Value Date     07/25/2022    POTASSIUM 3.9 07/25/2022    MAG 2.1 07/25/2022    CR 0.94 07/25/2022    COTY 8.7 07/25/2022    BILITOTAL 0.2 07/25/2022    ALBUMIN 3.0 (L) 07/25/2022    ALT 32 07/25/2022    AST 19 07/25/2022     Results reviewed, labs MET treatment parameters, ok to proceed with treatment.    Post Infusion Assessment:  Patient tolerated infusion without incident.  Blood return noted pre and post infusion.  Site patent and intact, free from redness, edema or discomfort.  No evidence of extravasations.  Access discontinued per protocol.     Discharge Plan:   Copy of AVS reviewed with patient and/or family.  Patient will return 8/3/22 for next appointment.  Patient discharged in stable condition accompanied by: self.  Departure Mode: Ambulatory.      Suzette Riley RN

## 2022-08-03 NOTE — PROGRESS NOTES
Infusion Nursing Note:  Ifrah Huitron presents today for IVFs.    Patient seen by provider today: No   present during visit today: Not Applicable.    Note: N/A.    Intravenous Access:  Implanted Port.    Treatment Conditions:  Not Applicable.    Post Infusion Assessment:  Patient tolerated infusion without incident.  Site patent and intact, free from redness, edema or discomfort.  No evidence of extravasations.  Access discontinued per protocol.     Discharge Plan:   Patient discharged in stable condition accompanied by: self.  Departure Mode: Ambulatory.      Warren Santana RN

## 2022-08-08 NOTE — LETTER
8/8/2022         RE: Ifrah Huitron  [address] [place] [address]        Dear Colleague,    Thank you for referring your patient, Ifrah Huitron, to the [facility]. Please see a copy of my visit note below.    Ifrah is a 72 year old who is being evaluated via a billable video visit.  Currently in [place].    How would you like to obtain your AVS? MyChart  If the video visit is dropped, the invitation should be resent by: Text to cell phone: [phone]  Will anyone else be joining your video visit? Yes, patients wife Radha Urbina, JENNIFER    Video-Visit Details    Video Start Time: 3:20pm    Type of service:  Video Visit    Video End Time: 3:50pm  Originating Location (pt. Location): Home    Distant Location (provider location):  [facility]     Platform used for Video Visit: Bountii      Oncology/Hematology Visit Note  Aug 8, 2022    Reason for Visit: Follow up of spindle cell sarcoma     History of Present Illness: Ifrah Huitron is a 71 year old male with PMH rosacea, pituitary macroadenoma s/p resection, GERD, kidney stones, DJD with metastatic spindle cell sarcoma. He presented to his PCP March 2021 with chest pain/left shoulder and back pain that led to imaging which revealed multiple lung mets. There were difficulties in getting diagnostic biopsy, eventually biopsy of mediastinal mass with spindle cell sarcoma. There was high PD-L1 expression (90%). Baseline imaging 6/21/21 with progression of lung mets and left-sided subdiaphragmatic mass, stable liver lesions. 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      He started Keytruda 6/21/21. CT 8/2/21 with positive response to treatment. CT 10/18/21 with mixed response- LUQ mass larger, anterior mediastinal and left anterior pleural mass smaller. Keytruda stopped.     Started on Doxil + Ifosfamide  10/26/21. Poorly tolerated with mucositis, nausea, poor oral intake. CT with stable to positive response.      Received C2 12/1/21 (delayed for tolerance issues) with 10% dose reduction.     Received C3 1/4/22 with same 10% dose reduction. He had issues with nausea inpatient along with recurrent thrush.      Received C4 2/2/22 with same 10% dose reduction. Had more significant neurotoxicity so only received 5.5 days. Symptoms resolved after stopping Ifosfamide.      CT 3/8/22 with positive response to treatment.      He is now on Doxil alone but has been dealing with worsening mouth and skin toxicity and has required delays and dose reductions.    Interval History:  Ifrah is overall feeling well. Mouth sores improved, though taste still not completely normal. Skin lesions improved. Has noted slight pressure/discomfort in her scapula and mid back which makes him worried the cancer is worse. He has not needed any pain meds.     Review of Systems:  Patient denies fevers, chills, night sweats, unexplained weight changes, headaches, dizziness, vision or hearing changes, new lumps or bumps, chest pain, shortness of breath, cough, abdominal pain, nausea, vomiting, changes to bowel or bladder, swelling of extremities, bleeding issues, or rash.    Current Outpatient Medications   Medication Sig Dispense Refill     escitalopram (LEXAPRO) 10 MG tablet Take 1 tablet (10 mg) by mouth daily 90 tablet 3     potassium chloride ER (KLOR-CON M) 20 MEQ CR tablet Take 1 tablet (20 mEq) by mouth daily 90 tablet 3     cholecalciferol 25 MCG (1000 UT) TABS Take 1,000 Units by mouth daily        clotrimazole (MYCELEX) 10 MG lozenge Place 1 lozenge (10 mg) inside cheek 5 times daily 90 lozenge 1     fluconazole (DIFLUCAN) 200 MG tablet Take 1 tablet (200 mg) by mouth daily for 7 days Can start 2-3 days prior to chemotherapy       guaiFENesin-codeine (GUAIFENESIN AC) 100-10 MG/5ML syrup Take 10 mLs by mouth every 4 hours as needed for cough  "(Patient taking differently: Take 10 mLs by mouth every 4 hours as needed for cough prn) 118 mL 0     hydrocortisone (CORTEF) 10 MG tablet Take 20 mg in the morning and 10 mg in the afternoon 270 tablet 3     Insulin Syringe-Needle U-100 27.5G X 5/8\" 2 ML MISC 1 each daily as needed (with solucortef for adrenal crisis) 10 each 1     levothyroxine (SYNTHROID/LEVOTHROID) 75 MCG tablet Take 1 tablet (75 mcg) by mouth every morning 90 tablet 3     Menthol-Methyl Salicylate (DALIA MALIK GREASELESS) cream Apply topically 2 times daily       metroNIDAZOLE (METROGEL) 1 % external gel Apply topically daily .Try stronger dose due to increased symptoms after chemo. 30 g 0     omeprazole (PRILOSEC) 20 MG DR capsule Take 1 capsule (20 mg) by mouth daily 60 capsule 1     ondansetron (ZOFRAN) 4 MG tablet Take 1-2 tablets (4-8 mg) by mouth every 8 hours as needed for nausea 60 tablet 3     phosphorus tablet 250 mg (PHOSPHA 250 NEUTRAL) 250 MG per tablet Take 1 tablet (250 mg) by mouth 2 times daily 60 tablet 3     prochlorperazine (COMPAZINE) 5 MG tablet Take 1 tablet (5 mg) by mouth every 6 hours as needed for nausea or vomiting . Caution: causes sedation. (Patient taking differently: Take 5 mg by mouth every 6 hours as needed for nausea or vomiting . Caution: causes sedation. PRN) 30 tablet 1     SUMAtriptan (IMITREX) 50 MG tablet Take 1 tablet (50 mg) by mouth at onset of headache for migraine May repeat in 2 hours. Max 4 tablets/24 hours. (Patient taking differently: Take 50 mg by mouth at onset of headache for migraine May repeat in 2 hours. Max 4 tablets/24 hours. PRN) 10 tablet 3     triamcinolone (KENALOG) 0.1 % external cream Apply topically 2 times daily to bothersome skin areas. 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     Thyroid disease     removed pituitary gland     Past Surgical History: "   Procedure Laterality Date     COLONOSCOPY N/A 12/17/2020    Procedure: COLONOSCOPY;  Surgeon: Ken Camacho MD;  Location: [facility]     ENT SURGERY       HERNIA REPAIR       INSERT PORT VASCULAR ACCESS Right 1/28/2022    Procedure: INSERTION, VASCULAR ACCESS PORT;  Surgeon: Daniel Kinney MD;  Location: [facility] OR     IR CHEST PORT PLACEMENT > 5 YRS OF AGE  1/28/2022     LARYNGOSCOPY, EXCISE VOCAL CORD LESION MICROSCOPIC, COMBINED Left 07/01/2021    Procedure: MICROLARYNGOSCOPY, LEFT TRUE VOCAL CORD INJECTION WITH PROLARYN;  Surgeon: Kyree Bearden MD;  Location: [facility]     PHACOEMULSIFICATION WITH STANDARD INTRAOCULAR LENS IMPLANT Right 03/10/2021    Procedure: Cataract removal with implant.;  Surgeon: Jamir Mac MD;  Location: [facility]     PHACOEMULSIFICATION WITH STANDARD INTRAOCULAR LENS IMPLANT Left 04/05/2021    Procedure: Cataract removal with implant.;  Surgeon: Jamir Mac MD;  Location: [facility]     PICC DOUBLE LUMEN PLACEMENT Right 12/01/2021    5FR DL PICC, basilic vein. L-38cm, 1cm out.     PICC DOUBLE LUMEN PLACEMENT Right 01/04/2022    Right cephalic, 41 cm, 1 external length     PITUITARY EXCISION       tooth pulled 4/7  Right      Allergies   Allergen Reactions     Penicillins Hives and Swelling            Social History   Social History     Tobacco Use     Smoking status: Never Smoker     Smokeless tobacco: Never Used   Substance Use Topics     Alcohol use: Yes     Comment: rare     Drug use: Never      Past medical history and social history were reviewed.    Physical Examination:  There were no vitals taken for this visit.  Wt Readings from Last 10 Encounters:   07/12/22 60.4 kg (133 lb 3.2 oz)   06/27/22 60.3 kg (133 lb)   06/09/22 62.1 kg (137 lb)   04/26/22 64 kg (141 lb 3.2 oz)   04/07/22 64.7 kg (142 lb 9.6 oz)   03/25/22 66.2 kg (146 lb)   03/10/22 66.2 kg (146 lb)   02/08/22 63 kg (138 lb 14.4 oz)   01/28/22 65.8 kg (145 lb)   01/11/22 65.1 kg (143 lb 8 oz)     Video  physical exam  General: Patient appears well in no acute distress.   Skin: No visualized rash or lesions on visualized skin  Eyes: EOMI, no erythema, sclera icterus or discharge noted  Resp: Appears to be breathing comfortably without accessory muscle usage, speaking in full sentences, no cough  MSK: Appears to have normal range of motion based on visualized movements  Neurologic: No apparent tremors, facial movements symmetric  Psych: affect normal, alert and oriented    Laboratory Data:   Latest Reference Range & Units 08/08/22 09:32   Sodium 133 - 144 mmol/L 144   Potassium 3.4 - 5.3 mmol/L 3.3 (L)   Chloride 94 - 109 mmol/L 110 (H)   Carbon Dioxide 20 - 32 mmol/L 26   Urea Nitrogen 7 - 30 mg/dL 16   Creatinine 0.66 - 1.25 mg/dL 1.02   GFR Estimate >60 mL/min/1.73m2 77   Calcium 8.5 - 10.1 mg/dL 8.4 (L)   Anion Gap 3 - 14 mmol/L 8   Magnesium 1.6 - 2.3 mg/dL 2.0   Phosphorus 2.5 - 4.5 mg/dL 2.5   Albumin 3.4 - 5.0 g/dL 3.0 (L)   Protein Total 6.8 - 8.8 g/dL 6.4 (L)   Alkaline Phosphatase 40 - 150 U/L 141   ALT 0 - 70 U/L 27   AST 0 - 45 U/L 23   Bilirubin Total 0.2 - 1.3 mg/dL 0.2   Glucose 70 - 99 mg/dL 135 (H)   WBC 4.0 - 11.0 10e3/uL 5.4   Hemoglobin 13.3 - 17.7 g/dL 9.5 (L)   Hematocrit 40.0 - 53.0 % 30.8 (L)   Platelet Count 150 - 450 10e3/uL 217   RBC Count 4.40 - 5.90 10e6/uL 3.19 (L)   MCV 78 - 100 fL 97   MCH 26.5 - 33.0 pg 29.8   MCHC 31.5 - 36.5 g/dL 30.8 (L)   RDW 10.0 - 15.0 % 15.9 (H)   % Neutrophils % 50   % Lymphocytes % 32   % Monocytes % 16   % Eosinophils % 0   % Basophils % 1   Absolute Basophils 0.0 - 0.2 10e3/uL 0.0   Absolute Eosinophils 0.0 - 0.7 10e3/uL 0.0   Absolute Immature Granulocytes <=0.4 10e3/uL 0.1   Absolute Lymphocytes 0.8 - 5.3 10e3/uL 1.7   Absolute Monocytes 0.0 - 1.3 10e3/uL 0.8   % Immature Granulocytes % 1   Absolute Neutrophils 1.6 - 8.3 10e3/uL 2.7   Absolute NRBCs 10e3/uL 0.0   NRBCs per 100 WBC <1 /100 0   (L): Data is abnormally low  (H): Data is abnormally  high    CT CAP 8/8/22  FINDINGS:   LUNGS AND PLEURA: Patent central airways. No acute appearing focal  consolidation. Slight enlargement in the lateral left lower lobe  nodule now measuring 0.2 x 0.4 cm, previously 0.2 x 0.2 cm (series 3,  image 5 and 90). No pleural effusion. No pneumothorax.     MEDIASTINUM/AXILLAE: Right chest port with the tip of the  supraclavicular jugular junction. When measured similarly, slight  enlargement in the anterior mediastinal soft tissue now measuring 2.2  x 2.3 cm, previously 2.8 x 2.0 cm (series 2, image 60). No new  intrathoracic lymphadenopathy by size criteria. Normal caliber  thoracic aorta. Mild to moderate coronary artery calcifications.  Normal heart size. No pericardial effusion. Normal esophagus.     HEPATOBILIARY: Unchanged multiple subcentimeter hepatic hypodensities,  which remain too small to characterize. No biliary dilatation. No  calcified gallstones.     PANCREAS: Homogeneous enhancement. No focal pancreatic mass. No  periventricular inflammation. No pancreatic ductal dilatation.     SPLEEN: Enlarging soft tissue mass invading the spleen and extending  in the left retroperitoneal space abutting the diaphragm now measuring  up to 8.0 x 6.6 in number, previously 7.2 x 5.5 cm (series 2, image  127). There is persistent abutment of the superior left renal pole.     ADRENAL GLANDS: No new nodules.     KIDNEYS/BLADDER: Bilateral renal cysts and additional subcentimeter  hypodensities which are too small to be characterized remain  unchanged. No new collecting system dilatation. No urinary bladder  wall thickening.     BOWEL: Unremarkable appearance of the stomach and duodenum. No dilated  loops of small bowel to suggest obstruction. Normal caliber appendix.  Colonic diverticulosis.     LYMPH NODES: No new abdominal or pelvic lymphadenopathy by size  criteria.     VASCULATURE: Normal caliber abdominal aorta. A few scattered  atelectatic plaques. Patent portal, splenic,  and superior mesenteric  veins. Patent bilateral renal veins.     PELVIC ORGANS: Prostatic hypertrophy.     ADDITIONAL FINDINGS: No ascites or fluid collections.     MUSCULOSKELETAL: Unchanged minimal ill-defined sclerosis in the medial  right iliac wing. No aggressive osseous lesion.                                                                      IMPRESSION:  1.  Interval enlargement in the infiltrative soft tissue mass in the  left retroperitoneal invading the spleen and abutting the superior  left renal pole.  2.  Interval enlargement in the soft tissue mass in the anterior left  mediastinum.  3.  Slight interval enlargement in the tiny left lower lobe nodule.  Attention on follow-up is recommended.  4.  No new lymphadenopathy.     JENNIFER MARQUES MD       Assessment and Plan:  1. 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 High PD-L1. Was on Keytruda but had progression, started Doxil + Ifos started 10/26/21. Completed 4 cycles with positive response but had significant toxicities (nausea/vomiting, dehydration, neurotoxicity, mucositis, skin toxicity, pancytopenia, hypokalemia, hypophosphatemia). Has been on Doxil alone since March 2021.    Unfortunately he has signs of disease progression which was reviewed with patient. Discussed with Dr. Wolff and will stop Doxil and switch to Gemzar.    Plan for Gemcitabine 675mg/m2 day 1 day 8 every 21 days. Discussed toxicities of treatment including myelosuppression, nausea/vomiting, flu-like symptoms, edema, rashes, rare pulmonary toxicity.     Due to family vacation cycle 1 will be given 8/10 and 8/25. Will continue IVF weekly PRN for dehydration.    Overall plan for 3-4 cycles and then repeat imaging.      2. GI  Dyspepsia: Continue Omeprazole 40mg daily. Continue Tums PRN     CINV: Zofran PRN. Discussed may need more often with Gemzar.      3. 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 Derm  Rosacea: Long standing issue, continue Metrogel PRN     Hand/foot: 2/2 Doxil, improved. Should resolve since we are stopping Doxil.     5. MSK  Left shoulder/back/chest wall pain 2/2 tumor, following with palliative. Minimal pain currently, off all pain medications.  Tylenol PRN.     6. ENT  Hoarseness: Thought 2/2 mediastinal mass vs irritation from prior biopsy. Following with ENT, s/p vocal cord injection 7/1/21. Following with voice clinic.      Mucositis/Thrush: Has been a significant issue with treatment. Saw ENT, now doing Fluconazole starting the day before treatment and PRN clotrimazole lozenges + dose reduction last cycle and symptoms improved. Should completely resolve since we are stopping Doxil.      7. Pulm/Cards  Working with speech on laryngreal dysfunction.     SOB improved. CT stable. Echo stable.      Continue Guaifenesin-Coedine PRN for cough, much improved.       Off statin due to LFT elevation, improved.      8. FEN  Hypokalemia: Recurrent issue, continue 20meq daily.      Hypophosphatemia: Continue 250mg phos BID for recurrent issues.      Renal insufficiency: Doing better, creat WNL now. Continue weekly IVF PRN dehydration.      Previously referred to medical cannabis program for poor appetite related to malignancy/treatment. Not discussed today.      9. Psych/Neuro  Anxiety/depression: Improved, on Lexapro and feels like it is helping. Continue.      Continue PRN Imitrex for migraines.      10. Heme  Pancytopenia 2/2 chemotherapy.      45 minutes spent on the date of the encounter doing chart review, review of test results, interpretation of tests, patient visit, documentation and discussion with other provider(s)     Maynor Rios PA-C  Department of Hematology and Oncology  [facility]          Again, thank you for allowing me to participate in the care of your patient.        Sincerely,        GERALDO Mullins

## 2022-08-08 NOTE — LETTER
8/8/2022         RE: Ifrah Huitron  [id number] [place] [address]        Dear Colleague,    Thank you for referring your patient, Ifrah Huitron, to the [facility]. Please see a copy of my visit note below.    Ifrah is a 72 year old who is being evaluated via a billable video visit.  Currently in [place].    How would you like to obtain your AVS? MyChart  If the video visit is dropped, the invitation should be resent by: Text to cell phone: [phone]  Will anyone else be joining your video visit? Yes, patients wife Radha Urbina, JENNIFER    Video-Visit Details    Video Start Time: 3:20pm    Type of service:  Video Visit    Video End Time: 3:50pm  Originating Location (pt. Location): Home    Distant Location (provider location):  [facility]     Platform used for Video Visit: [employer]      Oncology/Hematology Visit Note  Aug 8, 2022    Reason for Visit: Follow up of spindle cell sarcoma     History of Present Illness: Ifrah Huitron is a 71 year old male with PMH rosacea, pituitary macroadenoma s/p resection, GERD, kidney stones, DJD with metastatic spindle cell sarcoma. He presented to his PCP March 2021 with chest pain/left shoulder and back pain that led to imaging which revealed multiple lung mets. There were difficulties in getting diagnostic biopsy, eventually biopsy of mediastinal mass with spindle cell sarcoma. There was high PD-L1 expression (90%). Baseline imaging 6/21/21 with progression of lung mets and left-sided subdiaphragmatic mass, stable liver lesions. 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      He started Keytruda 6/21/21. CT 8/2/21 with positive response to treatment. CT 10/18/21 with mixed response- LUQ mass larger, anterior mediastinal and left anterior pleural mass smaller. Keytruda stopped.     Started on Doxil + Ifosfamide  10/26/21. Poorly tolerated with mucositis, nausea, poor oral intake. CT with stable to positive response.      Received C2 12/1/21 (delayed for tolerance issues) with 10% dose reduction.     Received C3 1/4/22 with same 10% dose reduction. He had issues with nausea inpatient along with recurrent thrush.      Received C4 2/2/22 with same 10% dose reduction. Had more significant neurotoxicity so only received 5.5 days. Symptoms resolved after stopping Ifosfamide.      CT 3/8/22 with positive response to treatment.      He is now on Doxil alone but has been dealing with worsening mouth and skin toxicity and has required delays and dose reductions.    Interval History:  Ifrah is overall feeling well. Mouth sores improved, though taste still not completely normal. Skin lesions improved. Has noted slight pressure/discomfort in her scapula and mid back which makes him worried the cancer is worse. He has not needed any pain meds.     Review of Systems:  Patient denies fevers, chills, night sweats, unexplained weight changes, headaches, dizziness, vision or hearing changes, new lumps or bumps, chest pain, shortness of breath, cough, abdominal pain, nausea, vomiting, changes to bowel or bladder, swelling of extremities, bleeding issues, or rash.    Current Outpatient Medications   Medication Sig Dispense Refill     escitalopram (LEXAPRO) 10 MG tablet Take 1 tablet (10 mg) by mouth daily 90 tablet 3     potassium chloride ER (KLOR-CON M) 20 MEQ CR tablet Take 1 tablet (20 mEq) by mouth daily 90 tablet 3     cholecalciferol 25 MCG (1000 UT) TABS Take 1,000 Units by mouth daily        clotrimazole (MYCELEX) 10 MG lozenge Place 1 lozenge (10 mg) inside cheek 5 times daily 90 lozenge 1     fluconazole (DIFLUCAN) 200 MG tablet Take 1 tablet (200 mg) by mouth daily for 7 days Can start 2-3 days prior to chemotherapy       guaiFENesin-codeine (GUAIFENESIN AC) 100-10 MG/5ML syrup Take 10 mLs by mouth every 4 hours as needed for cough  "(Patient taking differently: Take 10 mLs by mouth every 4 hours as needed for cough prn) 118 mL 0     hydrocortisone (CORTEF) 10 MG tablet Take 20 mg in the morning and 10 mg in the afternoon 270 tablet 3     Insulin Syringe-Needle U-100 27.5G X 5/8\" 2 ML MISC 1 each daily as needed (with solucortef for adrenal crisis) 10 each 1     levothyroxine (SYNTHROID/LEVOTHROID) 75 MCG tablet Take 1 tablet (75 mcg) by mouth every morning 90 tablet 3     Menthol-Methyl Salicylate (DALIA MALIK GREASELESS) cream Apply topically 2 times daily       metroNIDAZOLE (METROGEL) 1 % external gel Apply topically daily .Try stronger dose due to increased symptoms after chemo. 30 g 0     omeprazole (PRILOSEC) 20 MG DR capsule Take 1 capsule (20 mg) by mouth daily 60 capsule 1     ondansetron (ZOFRAN) 4 MG tablet Take 1-2 tablets (4-8 mg) by mouth every 8 hours as needed for nausea 60 tablet 3     phosphorus tablet 250 mg (PHOSPHA 250 NEUTRAL) 250 MG per tablet Take 1 tablet (250 mg) by mouth 2 times daily 60 tablet 3     prochlorperazine (COMPAZINE) 5 MG tablet Take 1 tablet (5 mg) by mouth every 6 hours as needed for nausea or vomiting . Caution: causes sedation. (Patient taking differently: Take 5 mg by mouth every 6 hours as needed for nausea or vomiting . Caution: causes sedation. PRN) 30 tablet 1     SUMAtriptan (IMITREX) 50 MG tablet Take 1 tablet (50 mg) by mouth at onset of headache for migraine May repeat in 2 hours. Max 4 tablets/24 hours. (Patient taking differently: Take 50 mg by mouth at onset of headache for migraine May repeat in 2 hours. Max 4 tablets/24 hours. PRN) 10 tablet 3     triamcinolone (KENALOG) 0.1 % external cream Apply topically 2 times daily to bothersome skin areas. 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     Thyroid disease     removed pituitary gland     Past Surgical History: "   Procedure Laterality Date     COLONOSCOPY N/A 12/17/2020    Procedure: COLONOSCOPY;  Surgeon: Ken Camacho MD;  Location: [facility]     ENT SURGERY       HERNIA REPAIR       INSERT PORT VASCULAR ACCESS Right 1/28/2022    Procedure: INSERTION, VASCULAR ACCESS PORT;  Surgeon: Daniel Kinney MD;  Location: [facility] OR     IR CHEST PORT PLACEMENT > 5 YRS OF AGE  1/28/2022     LARYNGOSCOPY, EXCISE VOCAL CORD LESION MICROSCOPIC, COMBINED Left 07/01/2021    Procedure: MICROLARYNGOSCOPY, LEFT TRUE VOCAL CORD INJECTION WITH PROLARYN;  Surgeon: Kyree Bearden MD;  Location: [facility]     PHACOEMULSIFICATION WITH STANDARD INTRAOCULAR LENS IMPLANT Right 03/10/2021    Procedure: Cataract removal with implant.;  Surgeon: Jamir Mac MD;  Location: [facility]     PHACOEMULSIFICATION WITH STANDARD INTRAOCULAR LENS IMPLANT Left 04/05/2021    Procedure: Cataract removal with implant.;  Surgeon: Jamir Mac MD;  Location: [facility]     PICC DOUBLE LUMEN PLACEMENT Right 12/01/2021    5FR DL PICC, basilic vein. L-38cm, 1cm out.     PICC DOUBLE LUMEN PLACEMENT Right 01/04/2022    Right cephalic, 41 cm, 1 external length     PITUITARY EXCISION       tooth pulled 4/7  Right      Allergies   Allergen Reactions     Penicillins Hives and Swelling            Social History   Social History     Tobacco Use     Smoking status: Never Smoker     Smokeless tobacco: Never Used   Substance Use Topics     Alcohol use: Yes     Comment: rare     Drug use: Never      Past medical history and social history were reviewed.    Physical Examination:  There were no vitals taken for this visit.  Wt Readings from Last 10 Encounters:   07/12/22 60.4 kg (133 lb 3.2 oz)   06/27/22 60.3 kg (133 lb)   06/09/22 62.1 kg (137 lb)   04/26/22 64 kg (141 lb 3.2 oz)   04/07/22 64.7 kg (142 lb 9.6 oz)   03/25/22 66.2 kg (146 lb)   03/10/22 66.2 kg (146 lb)   02/08/22 63 kg (138 lb 14.4 oz)   01/28/22 65.8 kg (145 lb)   01/11/22 65.1 kg (143 lb 8 oz)     Video  physical exam  General: Patient appears well in no acute distress.   Skin: No visualized rash or lesions on visualized skin  Eyes: EOMI, no erythema, sclera icterus or discharge noted  Resp: Appears to be breathing comfortably without accessory muscle usage, speaking in full sentences, no cough  MSK: Appears to have normal range of motion based on visualized movements  Neurologic: No apparent tremors, facial movements symmetric  Psych: affect normal, alert and oriented    Laboratory Data:   Latest Reference Range & Units 08/08/22 09:32   Sodium 133 - 144 mmol/L 144   Potassium 3.4 - 5.3 mmol/L 3.3 (L)   Chloride 94 - 109 mmol/L 110 (H)   Carbon Dioxide 20 - 32 mmol/L 26   Urea Nitrogen 7 - 30 mg/dL 16   Creatinine 0.66 - 1.25 mg/dL 1.02   GFR Estimate >60 mL/min/1.73m2 77   Calcium 8.5 - 10.1 mg/dL 8.4 (L)   Anion Gap 3 - 14 mmol/L 8   Magnesium 1.6 - 2.3 mg/dL 2.0   Phosphorus 2.5 - 4.5 mg/dL 2.5   Albumin 3.4 - 5.0 g/dL 3.0 (L)   Protein Total 6.8 - 8.8 g/dL 6.4 (L)   Alkaline Phosphatase 40 - 150 U/L 141   ALT 0 - 70 U/L 27   AST 0 - 45 U/L 23   Bilirubin Total 0.2 - 1.3 mg/dL 0.2   Glucose 70 - 99 mg/dL 135 (H)   WBC 4.0 - 11.0 10e3/uL 5.4   Hemoglobin 13.3 - 17.7 g/dL 9.5 (L)   Hematocrit 40.0 - 53.0 % 30.8 (L)   Platelet Count 150 - 450 10e3/uL 217   RBC Count 4.40 - 5.90 10e6/uL 3.19 (L)   MCV 78 - 100 fL 97   MCH 26.5 - 33.0 pg 29.8   MCHC 31.5 - 36.5 g/dL 30.8 (L)   RDW 10.0 - 15.0 % 15.9 (H)   % Neutrophils % 50   % Lymphocytes % 32   % Monocytes % 16   % Eosinophils % 0   % Basophils % 1   Absolute Basophils 0.0 - 0.2 10e3/uL 0.0   Absolute Eosinophils 0.0 - 0.7 10e3/uL 0.0   Absolute Immature Granulocytes <=0.4 10e3/uL 0.1   Absolute Lymphocytes 0.8 - 5.3 10e3/uL 1.7   Absolute Monocytes 0.0 - 1.3 10e3/uL 0.8   % Immature Granulocytes % 1   Absolute Neutrophils 1.6 - 8.3 10e3/uL 2.7   Absolute NRBCs 10e3/uL 0.0   NRBCs per 100 WBC <1 /100 0   (L): Data is abnormally low  (H): Data is abnormally  high    CT CAP 8/8/22  FINDINGS:   LUNGS AND PLEURA: Patent central airways. No acute appearing focal  consolidation. Slight enlargement in the lateral left lower lobe  nodule now measuring 0.2 x 0.4 cm, previously 0.2 x 0.2 cm (series 3,  image 5 and 90). No pleural effusion. No pneumothorax.     MEDIASTINUM/AXILLAE: Right chest port with the tip of the  supraclavicular jugular junction. When measured similarly, slight  enlargement in the anterior mediastinal soft tissue now measuring 2.2  x 2.3 cm, previously 2.8 x 2.0 cm (series 2, image 60). No new  intrathoracic lymphadenopathy by size criteria. Normal caliber  thoracic aorta. Mild to moderate coronary artery calcifications.  Normal heart size. No pericardial effusion. Normal esophagus.     HEPATOBILIARY: Unchanged multiple subcentimeter hepatic hypodensities,  which remain too small to characterize. No biliary dilatation. No  calcified gallstones.     PANCREAS: Homogeneous enhancement. No focal pancreatic mass. No  periventricular inflammation. No pancreatic ductal dilatation.     SPLEEN: Enlarging soft tissue mass invading the spleen and extending  in the left retroperitoneal space abutting the diaphragm now measuring  up to 8.0 x 6.6 in number, previously 7.2 x 5.5 cm (series 2, image  127). There is persistent abutment of the superior left renal pole.     ADRENAL GLANDS: No new nodules.     KIDNEYS/BLADDER: Bilateral renal cysts and additional subcentimeter  hypodensities which are too small to be characterized remain  unchanged. No new collecting system dilatation. No urinary bladder  wall thickening.     BOWEL: Unremarkable appearance of the stomach and duodenum. No dilated  loops of small bowel to suggest obstruction. Normal caliber appendix.  Colonic diverticulosis.     LYMPH NODES: No new abdominal or pelvic lymphadenopathy by size  criteria.     VASCULATURE: Normal caliber abdominal aorta. A few scattered  atelectatic plaques. Patent portal, splenic,  and superior mesenteric  veins. Patent bilateral renal veins.     PELVIC ORGANS: Prostatic hypertrophy.     ADDITIONAL FINDINGS: No ascites or fluid collections.     MUSCULOSKELETAL: Unchanged minimal ill-defined sclerosis in the medial  right iliac wing. No aggressive osseous lesion.                                                                      IMPRESSION:  1.  Interval enlargement in the infiltrative soft tissue mass in the  left retroperitoneal invading the spleen and abutting the superior  left renal pole.  2.  Interval enlargement in the soft tissue mass in the anterior left  mediastinum.  3.  Slight interval enlargement in the tiny left lower lobe nodule.  Attention on follow-up is recommended.  4.  No new lymphadenopathy.     JENNIFER MARQUES MD       Assessment and Plan:  1. 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 High PD-L1. Was on Keytruda but had progression, started Doxil + Ifos started 10/26/21. Completed 4 cycles with positive response but had significant toxicities (nausea/vomiting, dehydration, neurotoxicity, mucositis, skin toxicity, pancytopenia, hypokalemia, hypophosphatemia). Has been on Doxil alone since March 2021.    Unfortunately he has signs of disease progression which was reviewed with patient. Discussed with Dr. Wolff and will stop Doxil and switch to Gemzar.    Plan for Gemcitabine 675mg/m2 day 1 day 8 every 21 days. Discussed toxicities of treatment including myelosuppression, nausea/vomiting, flu-like symptoms, edema, rashes, rare pulmonary toxicity.     Due to family vacation cycle 1 will be given 8/10 and 8/25. Will continue IVF weekly PRN for dehydration.    Overall plan for 3-4 cycles and then repeat imaging.      2. GI  Dyspepsia: Continue Omeprazole 40mg daily. Continue Tums PRN     CINV: Zofran PRN. Discussed may need more often with Gemzar.      3. 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 Derm  Rosacea: Long standing issue, continue Metrogel PRN     Hand/foot: 2/2 Doxil, improved. Should resolve since we are stopping Doxil.     5. MSK  Left shoulder/back/chest wall pain 2/2 tumor, following with palliative. Minimal pain currently, off all pain medications.  Tylenol PRN.     6. ENT  Hoarseness: Thought 2/2 mediastinal mass vs irritation from prior biopsy. Following with ENT, s/p vocal cord injection 7/1/21. Following with voice clinic.      Mucositis/Thrush: Has been a significant issue with treatment. Saw ENT, now doing Fluconazole starting the day before treatment and PRN clotrimazole lozenges + dose reduction last cycle and symptoms improved. Should completely resolve since we are stopping Doxil.      7. Pulm/Cards  Working with speech on laryngreal dysfunction.     SOB improved. CT stable. Echo stable.      Continue Guaifenesin-Coedine PRN for cough, much improved.       Off statin due to LFT elevation, improved.      8. FEN  Hypokalemia: Recurrent issue, continue 20meq daily.      Hypophosphatemia: Continue 250mg phos BID for recurrent issues.      Renal insufficiency: Doing better, creat WNL now. Continue weekly IVF PRN dehydration.      Previously referred to medical cannabis program for poor appetite related to malignancy/treatment. Not discussed today.      9. Psych/Neuro  Anxiety/depression: Improved, on Lexapro and feels like it is helping. Continue.      Continue PRN Imitrex for migraines.      10. Heme  Pancytopenia 2/2 chemotherapy.      45 minutes spent on the date of the encounter doing chart review, review of test results, interpretation of tests, patient visit, documentation and discussion with other provider(s)     Maynor Rios PA-C  Department of Hematology and Oncology  [facility]          Again, thank you for allowing me to participate in the care of your patient.        Sincerely,        GERALDO Mullins

## 2022-08-08 NOTE — PROGRESS NOTES
Ifrah is a 72 year old who is being evaluated via a billable video visit.  Currently in [place].    How would you like to obtain your AVS? MyChart  If the video visit is dropped, the invitation should be resent by: Text to cell phone: [phone]  Will anyone else be joining your video visit? Yes, patients wife Radha Urbina, JENNIFER    Video-Visit Details    Video Start Time: 3:20pm    Type of service:  Video Visit    Video End Time: 3:50pm  Originating Location (pt. Location): Home    Distant Location (provider location):  [facility]     Platform used for Video Visit: [employer]      Oncology/Hematology Visit Note  Aug 8, 2022    Reason for Visit: Follow up of spindle cell sarcoma     History of Present Illness: Ifrah Huitron is a 71 year old male with PMH rosacea, pituitary macroadenoma s/p resection, GERD, kidney stones, DJD with metastatic spindle cell sarcoma. He presented to his PCP March 2021 with chest pain/left shoulder and back pain that led to imaging which revealed multiple lung mets. There were difficulties in getting diagnostic biopsy, eventually biopsy of mediastinal mass with spindle cell sarcoma. There was high PD-L1 expression (90%). Baseline imaging 6/21/21 with progression of lung mets and left-sided subdiaphragmatic mass, stable liver lesions. 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      He started Keytruda 6/21/21. CT 8/2/21 with positive response to treatment. CT 10/18/21 with mixed response- LUQ mass larger, anterior mediastinal and left anterior pleural mass smaller. Keytruda stopped.     Started on Doxil + Ifosfamide 10/26/21. Poorly tolerated with mucositis, nausea, poor oral intake. CT with stable to positive response.      Received C2 12/1/21 (delayed for tolerance issues) with 10% dose reduction.     Received C3 1/4/22 with same 10% dose reduction. He had issues with  "nausea inpatient along with recurrent thrush.      Received C4 2/2/22 with same 10% dose reduction. Had more significant neurotoxicity so only received 5.5 days. Symptoms resolved after stopping Ifosfamide.      CT 3/8/22 with positive response to treatment.      He is now on Doxil alone but has been dealing with worsening mouth and skin toxicity and has required delays and dose reductions.    Interval History:  Ifrah is overall feeling well. Mouth sores improved, though taste still not completely normal. Skin lesions improved. Has noted slight pressure/discomfort in her scapula and mid back which makes him worried the cancer is worse. He has not needed any pain meds.     Review of Systems:  Patient denies fevers, chills, night sweats, unexplained weight changes, headaches, dizziness, vision or hearing changes, new lumps or bumps, chest pain, shortness of breath, cough, abdominal pain, nausea, vomiting, changes to bowel or bladder, swelling of extremities, bleeding issues, or rash.    Current Outpatient Medications   Medication Sig Dispense Refill     escitalopram (LEXAPRO) 10 MG tablet Take 1 tablet (10 mg) by mouth daily 90 tablet 3     potassium chloride ER (KLOR-CON M) 20 MEQ CR tablet Take 1 tablet (20 mEq) by mouth daily 90 tablet 3     cholecalciferol 25 MCG (1000 UT) TABS Take 1,000 Units by mouth daily        clotrimazole (MYCELEX) 10 MG lozenge Place 1 lozenge (10 mg) inside cheek 5 times daily 90 lozenge 1     fluconazole (DIFLUCAN) 200 MG tablet Take 1 tablet (200 mg) by mouth daily for 7 days Can start 2-3 days prior to chemotherapy       guaiFENesin-codeine (GUAIFENESIN AC) 100-10 MG/5ML syrup Take 10 mLs by mouth every 4 hours as needed for cough (Patient taking differently: Take 10 mLs by mouth every 4 hours as needed for cough prn) 118 mL 0     hydrocortisone (CORTEF) 10 MG tablet Take 20 mg in the morning and 10 mg in the afternoon 270 tablet 3     Insulin Syringe-Needle U-100 27.5G X 5/8\" 2 ML MISC " 1 each daily as needed (with solucortef for adrenal crisis) 10 each 1     levothyroxine (SYNTHROID/LEVOTHROID) 75 MCG tablet Take 1 tablet (75 mcg) by mouth every morning 90 tablet 3     Menthol-Methyl Salicylate (DALIA MALIK GREASELESS) cream Apply topically 2 times daily       metroNIDAZOLE (METROGEL) 1 % external gel Apply topically daily .Try stronger dose due to increased symptoms after chemo. 30 g 0     omeprazole (PRILOSEC) 20 MG DR capsule Take 1 capsule (20 mg) by mouth daily 60 capsule 1     ondansetron (ZOFRAN) 4 MG tablet Take 1-2 tablets (4-8 mg) by mouth every 8 hours as needed for nausea 60 tablet 3     phosphorus tablet 250 mg (PHOSPHA 250 NEUTRAL) 250 MG per tablet Take 1 tablet (250 mg) by mouth 2 times daily 60 tablet 3     prochlorperazine (COMPAZINE) 5 MG tablet Take 1 tablet (5 mg) by mouth every 6 hours as needed for nausea or vomiting . Caution: causes sedation. (Patient taking differently: Take 5 mg by mouth every 6 hours as needed for nausea or vomiting . Caution: causes sedation. PRN) 30 tablet 1     SUMAtriptan (IMITREX) 50 MG tablet Take 1 tablet (50 mg) by mouth at onset of headache for migraine May repeat in 2 hours. Max 4 tablets/24 hours. (Patient taking differently: Take 50 mg by mouth at onset of headache for migraine May repeat in 2 hours. Max 4 tablets/24 hours. PRN) 10 tablet 3     triamcinolone (KENALOG) 0.1 % external cream Apply topically 2 times daily to bothersome skin areas. 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     Thyroid disease     removed pituitary gland     Past Surgical History:   Procedure Laterality Date     COLONOSCOPY N/A 12/17/2020    Procedure: COLONOSCOPY;  Surgeon: Ken Camacho MD;  Location: [facility]     ENT SURGERY       HERNIA REPAIR       INSERT PORT VASCULAR ACCESS Right 1/28/2022    Procedure: INSERTION, VASCULAR ACCESS PORT;   Surgeon: Daniel Kinney MD;  Location: [facility] OR     IR CHEST PORT PLACEMENT > 5 YRS OF AGE  1/28/2022     LARYNGOSCOPY, EXCISE VOCAL CORD LESION MICROSCOPIC, COMBINED Left 07/01/2021    Procedure: MICROLARYNGOSCOPY, LEFT TRUE VOCAL CORD INJECTION WITH PROLARYN;  Surgeon: Kyree Bearden MD;  Location: [facility]     PHACOEMULSIFICATION WITH STANDARD INTRAOCULAR LENS IMPLANT Right 03/10/2021    Procedure: Cataract removal with implant.;  Surgeon: Jamir Mac MD;  Location: [facility]     PHACOEMULSIFICATION WITH STANDARD INTRAOCULAR LENS IMPLANT Left 04/05/2021    Procedure: Cataract removal with implant.;  Surgeon: Jamir Mac MD;  Location: [facility]     PICC DOUBLE LUMEN PLACEMENT Right 12/01/2021    5FR DL PICC, basilic vein. L-38cm, 1cm out.     PICC DOUBLE LUMEN PLACEMENT Right 01/04/2022    Right cephalic, 41 cm, 1 external length     PITUITARY EXCISION       tooth pulled 4/7  Right      Allergies   Allergen Reactions     Penicillins Hives and Swelling            Social History   Social History     Tobacco Use     Smoking status: Never Smoker     Smokeless tobacco: Never Used   Substance Use Topics     Alcohol use: Yes     Comment: rare     Drug use: Never      Past medical history and social history were reviewed.    Physical Examination:  There were no vitals taken for this visit.  Wt Readings from Last 10 Encounters:   07/12/22 60.4 kg (133 lb 3.2 oz)   06/27/22 60.3 kg (133 lb)   06/09/22 62.1 kg (137 lb)   04/26/22 64 kg (141 lb 3.2 oz)   04/07/22 64.7 kg (142 lb 9.6 oz)   03/25/22 66.2 kg (146 lb)   03/10/22 66.2 kg (146 lb)   02/08/22 63 kg (138 lb 14.4 oz)   01/28/22 65.8 kg (145 lb)   01/11/22 65.1 kg (143 lb 8 oz)     Video physical exam  General: Patient appears well in no acute distress.   Skin: No visualized rash or lesions on visualized skin  Eyes: EOMI, no erythema, sclera icterus or discharge noted  Resp: Appears to be breathing comfortably without accessory muscle usage,  speaking in full sentences, no cough  MSK: Appears to have normal range of motion based on visualized movements  Neurologic: No apparent tremors, facial movements symmetric  Psych: affect normal, alert and oriented    Laboratory Data:   Latest Reference Range & Units 08/08/22 09:32   Sodium 133 - 144 mmol/L 144   Potassium 3.4 - 5.3 mmol/L 3.3 (L)   Chloride 94 - 109 mmol/L 110 (H)   Carbon Dioxide 20 - 32 mmol/L 26   Urea Nitrogen 7 - 30 mg/dL 16   Creatinine 0.66 - 1.25 mg/dL 1.02   GFR Estimate >60 mL/min/1.73m2 77   Calcium 8.5 - 10.1 mg/dL 8.4 (L)   Anion Gap 3 - 14 mmol/L 8   Magnesium 1.6 - 2.3 mg/dL 2.0   Phosphorus 2.5 - 4.5 mg/dL 2.5   Albumin 3.4 - 5.0 g/dL 3.0 (L)   Protein Total 6.8 - 8.8 g/dL 6.4 (L)   Alkaline Phosphatase 40 - 150 U/L 141   ALT 0 - 70 U/L 27   AST 0 - 45 U/L 23   Bilirubin Total 0.2 - 1.3 mg/dL 0.2   Glucose 70 - 99 mg/dL 135 (H)   WBC 4.0 - 11.0 10e3/uL 5.4   Hemoglobin 13.3 - 17.7 g/dL 9.5 (L)   Hematocrit 40.0 - 53.0 % 30.8 (L)   Platelet Count 150 - 450 10e3/uL 217   RBC Count 4.40 - 5.90 10e6/uL 3.19 (L)   MCV 78 - 100 fL 97   MCH 26.5 - 33.0 pg 29.8   MCHC 31.5 - 36.5 g/dL 30.8 (L)   RDW 10.0 - 15.0 % 15.9 (H)   % Neutrophils % 50   % Lymphocytes % 32   % Monocytes % 16   % Eosinophils % 0   % Basophils % 1   Absolute Basophils 0.0 - 0.2 10e3/uL 0.0   Absolute Eosinophils 0.0 - 0.7 10e3/uL 0.0   Absolute Immature Granulocytes <=0.4 10e3/uL 0.1   Absolute Lymphocytes 0.8 - 5.3 10e3/uL 1.7   Absolute Monocytes 0.0 - 1.3 10e3/uL 0.8   % Immature Granulocytes % 1   Absolute Neutrophils 1.6 - 8.3 10e3/uL 2.7   Absolute NRBCs 10e3/uL 0.0   NRBCs per 100 WBC <1 /100 0   (L): Data is abnormally low  (H): Data is abnormally high    CT CAP 8/8/22  FINDINGS:   LUNGS AND PLEURA: Patent central airways. No acute appearing focal  consolidation. Slight enlargement in the lateral left lower lobe  nodule now measuring 0.2 x 0.4 cm, previously 0.2 x 0.2 cm (series 3,  image 5 and 90). No pleural  effusion. No pneumothorax.     MEDIASTINUM/AXILLAE: Right chest port with the tip of the  supraclavicular jugular junction. When measured similarly, slight  enlargement in the anterior mediastinal soft tissue now measuring 2.2  x 2.3 cm, previously 2.8 x 2.0 cm (series 2, image 60). No new  intrathoracic lymphadenopathy by size criteria. Normal caliber  thoracic aorta. Mild to moderate coronary artery calcifications.  Normal heart size. No pericardial effusion. Normal esophagus.     HEPATOBILIARY: Unchanged multiple subcentimeter hepatic hypodensities,  which remain too small to characterize. No biliary dilatation. No  calcified gallstones.     PANCREAS: Homogeneous enhancement. No focal pancreatic mass. No  periventricular inflammation. No pancreatic ductal dilatation.     SPLEEN: Enlarging soft tissue mass invading the spleen and extending  in the left retroperitoneal space abutting the diaphragm now measuring  up to 8.0 x 6.6 in number, previously 7.2 x 5.5 cm (series 2, image  127). There is persistent abutment of the superior left renal pole.     ADRENAL GLANDS: No new nodules.     KIDNEYS/BLADDER: Bilateral renal cysts and additional subcentimeter  hypodensities which are too small to be characterized remain  unchanged. No new collecting system dilatation. No urinary bladder  wall thickening.     BOWEL: Unremarkable appearance of the stomach and duodenum. No dilated  loops of small bowel to suggest obstruction. Normal caliber appendix.  Colonic diverticulosis.     LYMPH NODES: No new abdominal or pelvic lymphadenopathy by size  criteria.     VASCULATURE: Normal caliber abdominal aorta. A few scattered  atelectatic plaques. Patent portal, splenic, and superior mesenteric  veins. Patent bilateral renal veins.     PELVIC ORGANS: Prostatic hypertrophy.     ADDITIONAL FINDINGS: No ascites or fluid collections.     MUSCULOSKELETAL: Unchanged minimal ill-defined sclerosis in the medial  right iliac wing. No  aggressive osseous lesion.                                                                      IMPRESSION:  1.  Interval enlargement in the infiltrative soft tissue mass in the  left retroperitoneal invading the spleen and abutting the superior  left renal pole.  2.  Interval enlargement in the soft tissue mass in the anterior left  mediastinum.  3.  Slight interval enlargement in the tiny left lower lobe nodule.  Attention on follow-up is recommended.  4.  No new lymphadenopathy.     JENNIFER MARQUES MD       Assessment and Plan:  1. 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 High PD-L1. Was on Keytruda but had progression, started Doxil + Ifos started 10/26/21. Completed 4 cycles with positive response but had significant toxicities (nausea/vomiting, dehydration, neurotoxicity, mucositis, skin toxicity, pancytopenia, hypokalemia, hypophosphatemia). Has been on Doxil alone since March 2021.    Unfortunately he has signs of disease progression which was reviewed with patient. Discussed with Dr. Wolff and will stop Doxil and switch to Gemzar.    Plan for Gemcitabine 675mg/m2 day 1 day 8 every 21 days. Discussed toxicities of treatment including myelosuppression, nausea/vomiting, flu-like symptoms, edema, rashes, rare pulmonary toxicity.     Due to family vacation cycle 1 will be given 8/10 and 8/25. Will continue IVF weekly PRN for dehydration.    Overall plan for 3-4 cycles and then repeat imaging.      2. GI  Dyspepsia: Continue Omeprazole 40mg daily. Continue Tums PRN     CINV: Zofran PRN. Discussed may need more often with Gemzar.      3. 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 Tao who is having him double up his hydrocortisone with treatment which should help fatigue      Hypothyroidism continue Synthroid 75mcg daily, endo following      4. Derm  Rosacea: Long standing issue, continue Metrogel PRN     Hand/foot: 2/2 Doxil,  improved. Should resolve since we are stopping Doxil.     5. MSK  Left shoulder/back/chest wall pain 2/2 tumor, following with palliative. Minimal pain currently, off all pain medications.  Tylenol PRN.     6. ENT  Hoarseness: Thought 2/2 mediastinal mass vs irritation from prior biopsy. Following with ENT, s/p vocal cord injection 7/1/21. Following with voice clinic.      Mucositis/Thrush: Has been a significant issue with treatment. Saw ENT, now doing Fluconazole starting the day before treatment and PRN clotrimazole lozenges + dose reduction last cycle and symptoms improved. Should completely resolve since we are stopping Doxil.      7. Pulm/Cards  Working with speech on laryngreal dysfunction.     SOB improved. CT stable. Echo stable.      Continue Guaifenesin-Coedine PRN for cough, much improved.       Off statin due to LFT elevation, improved.      8. FEN  Hypokalemia: Recurrent issue, continue 20meq daily.      Hypophosphatemia: Continue 250mg phos BID for recurrent issues.      Renal insufficiency: Doing better, creat WNL now. Continue weekly IVF PRN dehydration.      Previously referred to medical cannabis program for poor appetite related to malignancy/treatment. Not discussed today.      9. Psych/Neuro  Anxiety/depression: Improved, on Lexapro and feels like it is helping. Continue.      Continue PRN Imitrex for migraines.      10. Heme  Pancytopenia 2/2 chemotherapy.      45 minutes spent on the date of the encounter doing chart review, review of test results, interpretation of tests, patient visit, documentation and discussion with other provider(s)     Maynor Rios PA-C  Department of Hematology and Oncology  [facility]

## 2022-08-08 NOTE — PROGRESS NOTES
Infusion Nursing Note:  Ifrah Huitron presents today for PAC labs and access for CT.    Patient seen by provider today: No   present during visit today: Not Applicable.    Note: N/A.    Intravenous Access:  Implanted Port.    Treatment Conditions:  Not Applicable.    Post Infusion Assessment:  Site patent and intact, free from redness, edema or discomfort.  Access discontinued per protocol.     Discharge Plan:   Patient discharged in stable condition accompanied by: self.  Departure Mode: Ambulatory.      Anup Velasco RN

## 2022-08-09 NOTE — TELEPHONE ENCOUNTER
Prior Authorization Retail Medication Request    Medication/Dose: ONDANSETRON HCL 4MG  ICD code (if different than what is on RX):    Previously Tried and Failed:    Rationale:      Insurance Name:  Medicare  Insurance ID:  [id number]      Pharmacy Information (if different than what is on RX)  Name:  [facility]  Phone:  [phone]

## 2022-08-09 NOTE — TELEPHONE ENCOUNTER
Central Prior Authorization Team   Phone: [phone]      PA Initiation    Medication: ONDANSETRON HCL 4MG-PA initiated  Insurance Company: [employer] - Phone [phone] Fax [phone]  Pharmacy Filling the Rx: [facility] [place] [id number] [facility]  Filling Pharmacy Phone: [phone]  Filling Pharmacy Fax:    Start Date: 8/9/2022

## 2022-08-10 NOTE — PROGRESS NOTES
PAC labs drawn without difficulty via site protocol. Patient tolerated well.    Suzette Riley RN on 8/10/2022 at 9:19 AM

## 2022-08-10 NOTE — TELEPHONE ENCOUNTER
I spoke to Javad at [facility]. He states this was approved. He is faxing the approval to me. Will update encounter when received.

## 2022-08-10 NOTE — PROGRESS NOTES
Infusion Nursing Note:  Ifrah Huitron presents today for IVF & Gemzar C1D1.    Patient seen by provider today: No. Pt saw Maynor Gabriel yesterday (8/9)   present during visit today: Not Applicable.    Note: N/A.    Intravenous Access:  Implanted Port.    Treatment Conditions:  Lab Results   Component Value Date    HGB 9.5 (L) 08/10/2022    WBC 5.7 08/10/2022    ANEU 3.3 04/22/2022    ANEUTAUTO 3.1 08/10/2022     08/10/2022      Lab Results   Component Value Date     08/10/2022    POTASSIUM 3.7 08/10/2022    MAG 2.0 08/08/2022    CR 1.06 08/10/2022    COTY 8.8 08/10/2022    BILITOTAL 0.2 08/10/2022    ALBUMIN 3.1 (L) 08/10/2022    ALT 27 08/10/2022    AST 25 08/10/2022     Results reviewed, labs MET treatment parameters, ok to proceed with treatment.    Post Infusion Assessment:  Patient tolerated infusion without incident.  Blood return noted pre and post infusion.  Site patent and intact, free from redness, edema or discomfort.  No evidence of extravasations.  Access discontinued per protocol.     Discharge Plan:   Copy of AVS reviewed with patient and/or family.  Patient will return 8/17/22 for next appointment.  Patient discharged in stable condition accompanied by: self.  Departure Mode: Ambulatory.      Suzette Riley RN

## 2022-08-11 NOTE — TELEPHONE ENCOUNTER
Prior Authorization Approval    Authorization Effective Date: 8/10/2022  Authorization Expiration Date: 12/31/2022  Medication: ONDANSETRON HCL 4MG-PA approved  Approved Dose/Quantity:   Reference #:     Insurance Company: [employer] - Phone [phone] Fax [phone]  Expected CoPay:       CoPay Card Available:      Foundation Assistance Needed:    Which Pharmacy is filling the prescription (Not needed for infusion/clinic administered): [facility] [place] - [id number] [facility]  Pharmacy Notified: Yes  Patient Notified: No

## 2022-08-17 NOTE — PROGRESS NOTES
Infusion Nursing Note:  Ifrah Huitron presents today for IVF.    Patient seen by provider today: No   present during visit today: Not Applicable.    Note: N/A.    Intravenous Access:  Implanted Port.    Treatment Conditions:  Lab Results   Component Value Date    HGB 8.4 (L) 08/17/2022    WBC 2.4 (L) 08/17/2022    ANEU 1.1 (L) 08/17/2022    ANEUTAUTO 3.1 08/10/2022     (L) 08/17/2022      Lab Results   Component Value Date     08/17/2022    POTASSIUM 3.5 08/17/2022    MAG 2.2 08/17/2022    CR 0.92 08/17/2022    COTY 8.4 (L) 08/17/2022    BILITOTAL 0.2 08/17/2022    ALBUMIN 3.0 (L) 08/17/2022    ALT 37 08/17/2022    AST 27 08/17/2022     Post Infusion Assessment:  Patient tolerated infusion without incident.  Blood return noted pre and post infusion.  Site patent and intact, free from redness, edema or discomfort.  No evidence of extravasations.  Access discontinued per protocol.     Discharge Plan:   Discharge instructions reviewed with: Patient.  Patient and/or family verbalized understanding of discharge instructions and all questions answered.  AVS to patient via "[employer]T.  Patient will return 8/23/2022 for video visit with GERALDO Capone for next appointment.   Patient discharged in stable condition accompanied by: self.  Departure Mode: Ambulatory.    Erin Lowe RN

## 2022-08-23 NOTE — NURSING NOTE
Patient declined individual allergy and medication review by support staff because he stated nothing had changed since last reviewed on 8/8. PO

## 2022-08-23 NOTE — PROGRESS NOTES
Ifrah is a 72 year old who is being evaluated via a billable video visit.      How would you like to obtain your AVS? MyChart  If the video visit is dropped, the invitation should be resent by: Text to cell phone: [phone]  Will anyone else be joining your video visit? Emma RODRIGUEZ          Video-Visit Details    Video Start Time: 2:30pm    Type of service:  Video Visit    Video End Time: 2:45pm    Originating Location (pt. Location): Home    Distant Location (provider location):  [facility]     Platform used for Video Visit: [facility]    Oncology/Hematology Visit Note  Aug 23, 2022    Reason for Visit: Follow up of spindle cell sarcoma     History of Present Illness: Ifrah Huitron is a 71 year old male with PMH rosacea, pituitary macroadenoma s/p resection, GERD, kidney stones, DJD with metastatic spindle cell sarcoma. He presented to his PCP March 2021 with chest pain/left shoulder and back pain that led to imaging which revealed multiple lung mets. There were difficulties in getting diagnostic biopsy, eventually biopsy of mediastinal mass with spindle cell sarcoma. There was high PD-L1 expression (90%). Baseline imaging 6/21/21 with progression of lung mets and left-sided subdiaphragmatic mass, stable liver lesions. 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      He started Keytruda 6/21/21. CT 8/2/21 with positive response to treatment. CT 10/18/21 with mixed response- LUQ mass larger, anterior mediastinal and left anterior pleural mass smaller. Keytruda stopped.     Started on Doxil + Ifosfamide 10/26/21. Poorly tolerated with mucositis, nausea, poor oral intake. CT with stable to positive response.      Received C2 12/1/21 (delayed for tolerance issues) with 10% dose reduction.     Received C3 1/4/22 with same 10% dose reduction. He had issues with nausea inpatient along with recurrent  "thrush.      Received C4 2/2/22 with same 10% dose reduction. Had more significant neurotoxicity so only received 5.5 days. Symptoms resolved after stopping Ifosfamide.      CT 3/8/22 with positive response to treatment.      He was on Doxil alone but has been dealing with worsening mouth and skin toxicity and has required delays and dose reductions. CT 8/8/22 with disease progression.    Switched to Gemzar 8/10/22.     Interval History:  [place]ened[place]ia tolerated Gemzar well. He continues to take Zofran for nausea which is minimal. He did have issues with mouth sores after treatment and has been doing salt/soda swishes. No signs of thrush. Eating has been harder with the mouth irritation but he is maintaining his weight. No concerns with hydration. No fevers, headaches, respiratory concerns, GI issues. Does note more swelling but not bothersome. Fatigue after treatment better now. Tumor discomfort flared after chemo but now is minimal. [place].     Current Outpatient Medications   Medication Sig Dispense Refill     clotrimazole (MYCELEX) 10 MG lozenge Place 1 lozenge (10 mg) inside cheek 5 times daily 90 lozenge 1     escitalopram (LEXAPRO) 10 MG tablet Take 1 tablet (10 mg) by mouth daily 90 tablet 3     guaiFENesin-codeine (GUAIFENESIN AC) 100-10 MG/5ML syrup Take 10 mLs by mouth every 4 hours as needed for cough (Patient taking differently: Take 10 mLs by mouth every 4 hours as needed for cough prn) 118 mL 0     hydrocortisone (CORTEF) 10 MG tablet Take 20 mg in the morning and 10 mg in the afternoon 270 tablet 3     Insulin Syringe-Needle U-100 27.5G X 5/8\" 2 ML MISC 1 each daily as needed (with solucortef for adrenal crisis) 10 each 1     levothyroxine (SYNTHROID/LEVOTHROID) 75 MCG tablet Take 1 tablet (75 mcg) by mouth every morning 90 tablet 3     Menthol-Methyl Salicylate (DALIA MALIK GREASELESS) cream Apply topically 2 times daily       metroNIDAZOLE (METROGEL) 1 % external gel Apply topically daily " .Try stronger dose due to increased symptoms after chemo. 30 g 0     omeprazole (PRILOSEC) 20 MG DR capsule Take 1 capsule (20 mg) by mouth 2 times daily 60 capsule 1     ondansetron (ZOFRAN) 4 MG tablet Take 1-2 tablets (4-8 mg) by mouth every 8 hours as needed for nausea 60 tablet 3     phosphorus tablet 250 mg (PHOSPHA 250 NEUTRAL) 250 MG per tablet Take 1 tablet (250 mg) by mouth 2 times daily 60 tablet 3     potassium chloride ER (KLOR-CON M) 20 MEQ CR tablet Take 1 tablet (20 mEq) by mouth daily 90 tablet 3     prochlorperazine (COMPAZINE) 5 MG tablet Take 1 tablet (5 mg) by mouth every 6 hours as needed for nausea or vomiting . Caution: causes sedation. (Patient taking differently: Take 5 mg by mouth every 6 hours as needed for nausea or vomiting . Caution: causes sedation. PRN) 30 tablet 1     SUMAtriptan (IMITREX) 50 MG tablet Take 1 tablet (50 mg) by mouth at onset of headache for migraine May repeat in 2 hours. Max 4 tablets/24 hours. (Patient taking differently: Take 50 mg by mouth at onset of headache for migraine May repeat in 2 hours. Max 4 tablets/24 hours. PRN) 10 tablet 3     triamcinolone (KENALOG) 0.1 % external cream Apply topically 2 times daily to bothersome skin areas. 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     Thyroid disease     removed pituitary gland     Past Surgical History:   Procedure Laterality Date     COLONOSCOPY N/A 12/17/2020    Procedure: COLONOSCOPY;  Surgeon: Ken Camacho MD;  Location: [facility]     ENT SURGERY       HERNIA REPAIR       INSERT PORT VASCULAR ACCESS Right 1/28/2022    Procedure: INSERTION, VASCULAR ACCESS PORT;  Surgeon: Daniel Kinney MD;  Location: [facility] OR     IR CHEST PORT PLACEMENT > 5 YRS OF AGE  1/28/2022     LARYNGOSCOPY, EXCISE VOCAL CORD LESION MICROSCOPIC, COMBINED Left 07/01/2021    Procedure: MICROLARYNGOSCOPY, LEFT  TRUE VOCAL CORD INJECTION WITH PROLARYN;  Surgeon: Kyree Bearden MD;  Location: [facility]     PHACOEMULSIFICATION WITH STANDARD INTRAOCULAR LENS IMPLANT Right 03/10/2021    Procedure: Cataract removal with implant.;  Surgeon: Jamir Mac MD;  Location: [facility]     PHACOEMULSIFICATION WITH STANDARD INTRAOCULAR LENS IMPLANT Left 04/05/2021    Procedure: Cataract removal with implant.;  Surgeon: Jamir Mac MD;  Location: [facility]     PICC DOUBLE LUMEN PLACEMENT Right 12/01/2021    5FR DL PICC, basilic vein. L-38cm, 1cm out.     PICC DOUBLE LUMEN PLACEMENT Right 01/04/2022    Right cephalic, 41 cm, 1 external length     PITUITARY EXCISION       tooth pulled 4/7  Right      Allergies   Allergen Reactions     Penicillins Hives and Swelling            Social History   Social History     Tobacco Use     Smoking status: Never Smoker     Smokeless tobacco: Never Used   Substance Use Topics     Alcohol use: Yes     Comment: rare     Drug use: Never      Past medical history and social history were reviewed.    Physical Examination:  There were no vitals taken for this visit.  Wt Readings from Last 10 Encounters:   08/10/22 63.4 kg (139 lb 11.2 oz)   07/12/22 60.4 kg (133 lb 3.2 oz)   06/27/22 60.3 kg (133 lb)   06/09/22 62.1 kg (137 lb)   04/26/22 64 kg (141 lb 3.2 oz)   04/07/22 64.7 kg (142 lb 9.6 oz)   03/25/22 66.2 kg (146 lb)   03/10/22 66.2 kg (146 lb)   02/08/22 63 kg (138 lb 14.4 oz)   01/28/22 65.8 kg (145 lb)     Video physical exam  General: Patient appears well in no acute distress.   Skin: No visualized rash or lesions on visualized skin  Eyes: EOMI, no erythema, sclera icterus or discharge noted  Resp: Appears to be breathing comfortably without accessory muscle usage, speaking in full sentences, no cough  MSK: Appears to have normal range of motion based on visualized movements  Neurologic: No apparent tremors, facial movements symmetric  Psych: affect normal, alert and oriented    Laboratory  Data:   Latest Reference Range & Units 08/17/22 10:59   Sodium 133 - 144 mmol/L 142   Potassium 3.4 - 5.3 mmol/L 3.5   Chloride 94 - 109 mmol/L 110 (H)   Carbon Dioxide 20 - 32 mmol/L 27   Urea Nitrogen 7 - 30 mg/dL 17   Creatinine 0.66 - 1.25 mg/dL 0.92   GFR Estimate >60 mL/min/1.73m2 87   Calcium 8.5 - 10.1 mg/dL 8.4 (L)   Anion Gap 3 - 14 mmol/L 5   Magnesium 1.6 - 2.3 mg/dL 2.2   Phosphorus 2.5 - 4.5 mg/dL 2.8   Albumin 3.4 - 5.0 g/dL 3.0 (L)   Protein Total 6.8 - 8.8 g/dL 6.3 (L)   Alkaline Phosphatase 40 - 150 U/L 146   ALT 0 - 70 U/L 37   AST 0 - 45 U/L 27   Bilirubin Total 0.2 - 1.3 mg/dL 0.2   Glucose 70 - 99 mg/dL 101 (H)   WBC 4.0 - 11.0 10e3/uL 2.4 (L)   Hemoglobin 13.3 - 17.7 g/dL 8.4 (L)   Hematocrit 40.0 - 53.0 % 26.8 (L)   Platelet Count 150 - 450 10e3/uL 108 (L)   RBC Count 4.40 - 5.90 10e6/uL 2.82 (L)   MCV 78 - 100 fL 95   MCH 26.5 - 33.0 pg 29.8   MCHC 31.5 - 36.5 g/dL 31.3 (L)   RDW 10.0 - 15.0 % 15.1 (H)   % Neutrophils % 46   % Lymphocytes % 43   % Monocytes % 6   % Eosinophils % 0   % Basophils % 5   Absolute Basophils 0.0 - 0.2 10e3/uL 0.1   Absolute Neutrophil 1.6 - 8.3 10e3/uL 1.1 (L)   Absolute Lymphocytes 0.8 - 5.3 10e3/uL 1.0   Absolute Monocytes 0.0 - 1.3 10e3/uL 0.1   Absolute Eosinophils 0.0 - 0.7 10e3/uL 0.0   (H): Data is abnormally high  (L): Data is abnormally low      Assessment and Plan:  1. 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 High PD-L1. Was on Keytruda but had progression, started Doxil + Ifos started 10/26/21. Completed 4 cycles with positive response but had significant toxicities (nausea/vomiting, dehydration, neurotoxicity, mucositis, skin toxicity, pancytopenia, hypokalemia, hypophosphatemia). Was on Doxil alone March 2022-July 2022.     Due to disease progression switched to Gemzar 8/10/22. Tolerating well. Will get delayed day 8 later this week (delayed for family vacation).    Plan for Gemcitabine 675mg/m2 day 1 day  8 every 21 days. See MARLEEN prior to each cycle. Repeat imaging in October.      2. GI  Dyspepsia: Continue Omeprazole 40mg daily. Continue Tums PRN     CINV: Zofran PRN.      3. 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 Tao. Stable.     Hypothyroidism continue Synthroid 75mcg daily, endo following      4. Derm  Rosacea: Long standing issue, continue Metrogel PRN     Hand/foot: 2/2 Doxil, improved. No issues now that he is off Doxil.     5. MSK  Left shoulder/back/chest wall pain 2/2 tumor, following with palliative. Minimal pain currently, off all pain medications.  Tylenol PRN.     6. ENT  Hoarseness: Thought 2/2 mediastinal mass vs irritation from prior biopsy. Following with ENT, s/p vocal cord injection 7/1/21. Following with voice clinic.      Mucositis/Thrush: Has been a significant issue with treatment. Thrush now resolved but having mucositis which is uncommon with Gemzar. Possibly from neutropenia? Continue salt/soda swishes, MMW, and Healios.     7. Pulm/Cards  Working with speech on laryngreal dysfunction.     SOB improved. CT stable. Echo stable.      Continue Guaifenesin-Coedine PRN for cough, much improved.       Off statin due to LFT elevation, improved.      8. FEN  Hypokalemia: Recurrent issue, continue 20meq daily.      Hypophosphatemia: Continue 250mg phos BID for recurrent issues.      Renal insufficiency: Doing better, creat WNL now. Continue IVF PRN dehydration.      Previously referred to medical cannabis program for poor appetite related to malignancy/treatment. Not discussed today.      9. Psych/Neuro  Anxiety/depression: Improved, on Lexapro and feels like it is helping. Continue.      Continue PRN Imitrex for migraines.      10. Heme  Pancytopenia 2/2 chemotherapy. Monitor. Transfuse if hgb <8. Defer chemo if plt <75 or ANC .     25 minutes spent on the date of the encounter doing chart review, review of test results, patient visit and documentation      Maynor Rios PA-C  Department of Hematology and Oncology  [facility]

## 2022-08-23 NOTE — LETTER
8/23/2022         RE: Ifrah Huitron  [address] [facility] [id number]        Dear Colleague,    Thank you for referring your patient, Ifrah Huitron, to the [facility]. Please see a copy of my visit note below.    Ifrah is a 72 year old who is being evaluated via a billable video visit.      How would you like to obtain your AVS? MyChart  If the video visit is dropped, the invitation should be resent by: Text to cell phone: [phone]  Will anyone else be joining your video visit? Emma Padillaty Cathy RODRIGUEZ          Video-Visit Details    Video Start Time: 2:30pm    Type of service:  Video Visit    Video End Time: 2:45pm    Originating Location (pt. Location): Home    Distant Location (provider location):  [facility]     Platform used for Video Visit: BoxTone    Oncology/Hematology Visit Note  Aug 23, 2022    Reason for Visit: Follow up of spindle cell sarcoma     History of Present Illness: Ifrah Huitron is a 71 year old male with PMH rosacea, pituitary macroadenoma s/p resection, GERD, kidney stones, DJD with metastatic spindle cell sarcoma. He presented to his PCP March 2021 with chest pain/left shoulder and back pain that led to imaging which revealed multiple lung mets. There were difficulties in getting diagnostic biopsy, eventually biopsy of mediastinal mass with spindle cell sarcoma. There was high PD-L1 expression (90%). Baseline imaging 6/21/21 with progression of lung mets and left-sided subdiaphragmatic mass, stable liver lesions. 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      He started Keytruda 6/21/21. CT 8/2/21 with positive response to treatment. CT 10/18/21 with mixed response- LUQ mass larger, anterior mediastinal and left anterior pleural mass smaller. Keytruda stopped.     Started on Doxil + Ifosfamide 10/26/21. Poorly tolerated with mucositis,  "nausea, poor oral intake. CT with stable to positive response.      Received C2 12/1/21 (delayed for tolerance issues) with 10% dose reduction.     Received C3 1/4/22 with same 10% dose reduction. He had issues with nausea inpatient along with recurrent thrush.      Received C4 2/2/22 with same 10% dose reduction. Had more significant neurotoxicity so only received 5.5 days. Symptoms resolved after stopping Ifosfamide.      CT 3/8/22 with positive response to treatment.      He was on Doxil alone but has been dealing with worsening mouth and skin toxicity and has required delays and dose reductions. CT 8/8/22 with disease progression.    Switched to Gemzar 8/10/22.     Interval History:  [place] tolerated Gemzar well. He continues to take Zofran for nausea which is minimal. He did have issues with mouth sores after treatment and has been doing salt/soda swishes. No signs of thrush. Eating has been harder with the mouth irritation but he is maintaining his weight. No concerns with hydration. No fevers, headaches, respiratory concerns, GI issues. Does note more swelling but not bothersome. Fatigue after treatment better now. Tumor discomfort flared after chemo but now is minimal. [place].     Current Outpatient Medications   Medication Sig Dispense Refill     clotrimazole (MYCELEX) 10 MG lozenge Place 1 lozenge (10 mg) inside cheek 5 times daily 90 lozenge 1     escitalopram (LEXAPRO) 10 MG tablet Take 1 tablet (10 mg) by mouth daily 90 tablet 3     guaiFENesin-codeine (GUAIFENESIN AC) 100-10 MG/5ML syrup Take 10 mLs by mouth every 4 hours as needed for cough (Patient taking differently: Take 10 mLs by mouth every 4 hours as needed for cough prn) 118 mL 0     hydrocortisone (CORTEF) 10 MG tablet Take 20 mg in the morning and 10 mg in the afternoon 270 tablet 3     Insulin Syringe-Needle U-100 27.5G X 5/8\" 2 ML MISC 1 each daily as needed (with solucortef for adrenal crisis) 10 each 1     " levothyroxine (SYNTHROID/LEVOTHROID) 75 MCG tablet Take 1 tablet (75 mcg) by mouth every morning 90 tablet 3     Menthol-Methyl Salicylate (DALIA MALIK GREASELESS) cream Apply topically 2 times daily       metroNIDAZOLE (METROGEL) 1 % external gel Apply topically daily .Try stronger dose due to increased symptoms after chemo. 30 g 0     omeprazole (PRILOSEC) 20 MG DR capsule Take 1 capsule (20 mg) by mouth 2 times daily 60 capsule 1     ondansetron (ZOFRAN) 4 MG tablet Take 1-2 tablets (4-8 mg) by mouth every 8 hours as needed for nausea 60 tablet 3     phosphorus tablet 250 mg (PHOSPHA 250 NEUTRAL) 250 MG per tablet Take 1 tablet (250 mg) by mouth 2 times daily 60 tablet 3     potassium chloride ER (KLOR-CON M) 20 MEQ CR tablet Take 1 tablet (20 mEq) by mouth daily 90 tablet 3     prochlorperazine (COMPAZINE) 5 MG tablet Take 1 tablet (5 mg) by mouth every 6 hours as needed for nausea or vomiting . Caution: causes sedation. (Patient taking differently: Take 5 mg by mouth every 6 hours as needed for nausea or vomiting . Caution: causes sedation. PRN) 30 tablet 1     SUMAtriptan (IMITREX) 50 MG tablet Take 1 tablet (50 mg) by mouth at onset of headache for migraine May repeat in 2 hours. Max 4 tablets/24 hours. (Patient taking differently: Take 50 mg by mouth at onset of headache for migraine May repeat in 2 hours. Max 4 tablets/24 hours. PRN) 10 tablet 3     triamcinolone (KENALOG) 0.1 % external cream Apply topically 2 times daily to bothersome skin areas. 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     Thyroid disease     removed pituitary gland     Past Surgical History:   Procedure Laterality Date     COLONOSCOPY N/A 12/17/2020    Procedure: COLONOSCOPY;  Surgeon: Ken Camacho MD;  Location: [facility]     ENT SURGERY       HERNIA REPAIR       INSERT PORT VASCULAR ACCESS Right 1/28/2022     Procedure: INSERTION, VASCULAR ACCESS PORT;  Surgeon: Daniel Kinney MD;  Location: [facility] OR     IR CHEST PORT PLACEMENT > 5 YRS OF AGE  1/28/2022     LARYNGOSCOPY, EXCISE VOCAL CORD LESION MICROSCOPIC, COMBINED Left 07/01/2021    Procedure: MICROLARYNGOSCOPY, LEFT TRUE VOCAL CORD INJECTION WITH PROLARYN;  Surgeon: Kyree Bearden MD;  Location: [facility]     PHACOEMULSIFICATION WITH STANDARD INTRAOCULAR LENS IMPLANT Right 03/10/2021    Procedure: Cataract removal with implant.;  Surgeon: Jamir Mac MD;  Location: [facility]     PHACOEMULSIFICATION WITH STANDARD INTRAOCULAR LENS IMPLANT Left 04/05/2021    Procedure: Cataract removal with implant.;  Surgeon: Jamir Mac MD;  Location: [facility]     PICC DOUBLE LUMEN PLACEMENT Right 12/01/2021    5FR DL PICC, basilic vein. L-38cm, 1cm out.     PICC DOUBLE LUMEN PLACEMENT Right 01/04/2022    Right cephalic, 41 cm, 1 external length     PITUITARY EXCISION       tooth pulled 4/7  Right      Allergies   Allergen Reactions     Penicillins Hives and Swelling            Social History   Social History     Tobacco Use     Smoking status: Never Smoker     Smokeless tobacco: Never Used   Substance Use Topics     Alcohol use: Yes     Comment: rare     Drug use: Never      Past medical history and social history were reviewed.    Physical Examination:  There were no vitals taken for this visit.  Wt Readings from Last 10 Encounters:   08/10/22 63.4 kg (139 lb 11.2 oz)   07/12/22 60.4 kg (133 lb 3.2 oz)   06/27/22 60.3 kg (133 lb)   06/09/22 62.1 kg (137 lb)   04/26/22 64 kg (141 lb 3.2 oz)   04/07/22 64.7 kg (142 lb 9.6 oz)   03/25/22 66.2 kg (146 lb)   03/10/22 66.2 kg (146 lb)   02/08/22 63 kg (138 lb 14.4 oz)   01/28/22 65.8 kg (145 lb)     Video physical exam  General: Patient appears well in no acute distress.   Skin: No visualized rash or lesions on visualized skin  Eyes: EOMI, no erythema, sclera icterus or discharge noted  Resp: Appears to be breathing  comfortably without accessory muscle usage, speaking in full sentences, no cough  MSK: Appears to have normal range of motion based on visualized movements  Neurologic: No apparent tremors, facial movements symmetric  Psych: affect normal, alert and oriented    Laboratory Data:   Latest Reference Range & Units 08/17/22 10:59   Sodium 133 - 144 mmol/L 142   Potassium 3.4 - 5.3 mmol/L 3.5   Chloride 94 - 109 mmol/L 110 (H)   Carbon Dioxide 20 - 32 mmol/L 27   Urea Nitrogen 7 - 30 mg/dL 17   Creatinine 0.66 - 1.25 mg/dL 0.92   GFR Estimate >60 mL/min/1.73m2 87   Calcium 8.5 - 10.1 mg/dL 8.4 (L)   Anion Gap 3 - 14 mmol/L 5   Magnesium 1.6 - 2.3 mg/dL 2.2   Phosphorus 2.5 - 4.5 mg/dL 2.8   Albumin 3.4 - 5.0 g/dL 3.0 (L)   Protein Total 6.8 - 8.8 g/dL 6.3 (L)   Alkaline Phosphatase 40 - 150 U/L 146   ALT 0 - 70 U/L 37   AST 0 - 45 U/L 27   Bilirubin Total 0.2 - 1.3 mg/dL 0.2   Glucose 70 - 99 mg/dL 101 (H)   WBC 4.0 - 11.0 10e3/uL 2.4 (L)   Hemoglobin 13.3 - 17.7 g/dL 8.4 (L)   Hematocrit 40.0 - 53.0 % 26.8 (L)   Platelet Count 150 - 450 10e3/uL 108 (L)   RBC Count 4.40 - 5.90 10e6/uL 2.82 (L)   MCV 78 - 100 fL 95   MCH 26.5 - 33.0 pg 29.8   MCHC 31.5 - 36.5 g/dL 31.3 (L)   RDW 10.0 - 15.0 % 15.1 (H)   % Neutrophils % 46   % Lymphocytes % 43   % Monocytes % 6   % Eosinophils % 0   % Basophils % 5   Absolute Basophils 0.0 - 0.2 10e3/uL 0.1   Absolute Neutrophil 1.6 - 8.3 10e3/uL 1.1 (L)   Absolute Lymphocytes 0.8 - 5.3 10e3/uL 1.0   Absolute Monocytes 0.0 - 1.3 10e3/uL 0.1   Absolute Eosinophils 0.0 - 0.7 10e3/uL 0.0   (H): Data is abnormally high  (L): Data is abnormally low      Assessment and Plan:  1. 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 High PD-L1. Was on Keytruda but had progression, started Doxil + Ifos started 10/26/21. Completed 4 cycles with positive response but had significant toxicities (nausea/vomiting, dehydration, neurotoxicity, mucositis, skin toxicity,  pancytopenia, hypokalemia, hypophosphatemia). Was on Doxil alone March 2022-July 2022.     Due to disease progression switched to Gemzar 8/10/22. Tolerating well. Will get delayed day 8 later this week (delayed for family vacation).    Plan for Gemcitabine 675mg/m2 day 1 day 8 every 21 days. See MARLEEN prior to each cycle. Repeat imaging in October.      2. GI  Dyspepsia: Continue Omeprazole 40mg daily. Continue Tums PRN     CINV: Zofran PRN.      3. 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 Tao. Stable.     Hypothyroidism continue Synthroid 75mcg daily, endo following      4. Derm  Rosacea: Long standing issue, continue Metrogel PRN     Hand/foot: 2/2 Doxil, improved. No issues now that he is off Doxil.     5. MSK  Left shoulder/back/chest wall pain 2/2 tumor, following with palliative. Minimal pain currently, off all pain medications.  Tylenol PRN.     6. ENT  Hoarseness: Thought 2/2 mediastinal mass vs irritation from prior biopsy. Following with ENT, s/p vocal cord injection 7/1/21. Following with voice clinic.      Mucositis/Thrush: Has been a significant issue with treatment. Thrush now resolved but having mucositis which is uncommon with Gemzar. Possibly from neutropenia? Continue salt/soda swishes, MMW, and Healios.     7. Pulm/Cards  Working with speech on laryngreal dysfunction.     SOB improved. CT stable. Echo stable.      Continue Guaifenesin-[place] PRN for cough, much improved.       Off statin due to LFT elevation, improved.      8. FEN  Hypokalemia: Recurrent issue, continue 20meq daily.      Hypophosphatemia: Continue 250mg phos BID for recurrent issues.      Renal insufficiency: Doing better, creat WNL now. Continue IVF PRN dehydration.      Previously referred to medical cannabis program for poor appetite related to malignancy/treatment. Not discussed today.      9. Psych/Neuro  Anxiety/depression: Improved, on Lexapro and feels like it is helping.  Continue.      Continue PRN Imitrex for migraines.      10. Heme  Pancytopenia 2/2 chemotherapy. Monitor. Transfuse if hgb <8. Defer chemo if plt <75 or ANC .     25 minutes spent on the date of the encounter doing chart review, review of test results, patient visit and documentation     Maynor Rios PA-C  Department of Hematology and Oncology  [facility]         Again, thank you for allowing me to participate in the care of your patient.        Sincerely,        GERALDO Mullins

## 2022-08-23 NOTE — LETTER
8/23/2022         RE: Ifrah Huitron  [id number] [address] [facility] [address]        Dear Colleague,    Thank you for referring your patient, Ifrah Huitron, to the [facility]. Please see a copy of my visit note below.    Ifrah is a 72 year old who is being evaluated via a billable video visit.      How would you like to obtain your AVS? MyChart  If the video visit is dropped, the invitation should be resent by: Text to cell phone: [phone]  Will anyone else be joining your video visit? Emma Padillaty Cathy RODRIGUEZ          Video-Visit Details    Video Start Time: 2:30pm    Type of service:  Video Visit    Video End Time: 2:45pm    Originating Location (pt. Location): Home    Distant Location (provider location):  [facility]     Platform used for Video Visit: Introhive    Oncology/Hematology Visit Note  Aug 23, 2022    Reason for Visit: Follow up of spindle cell sarcoma     History of Present Illness: Ifrah Huitron is a 71 year old male with PMH rosacea, pituitary macroadenoma s/p resection, GERD, kidney stones, DJD with metastatic spindle cell sarcoma. He presented to his PCP March 2021 with chest pain/left shoulder and back pain that led to imaging which revealed multiple lung mets. There were difficulties in getting diagnostic biopsy, eventually biopsy of mediastinal mass with spindle cell sarcoma. There was high PD-L1 expression (90%). Baseline imaging 6/21/21 with progression of lung mets and left-sided subdiaphragmatic mass, stable liver lesions. 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      He started Keytruda 6/21/21. CT 8/2/21 with positive response to treatment. CT 10/18/21 with mixed response- LUQ mass larger, anterior mediastinal and left anterior pleural mass smaller. Keytruda stopped.     Started on Doxil + Ifosfamide 10/26/21. Poorly tolerated with mucositis,  "nausea, poor oral intake. CT with stable to positive response.      Received C2 12/1/21 (delayed for tolerance issues) with 10% dose reduction.     Received C3 1/4/22 with same 10% dose reduction. He had issues with nausea inpatient along with recurrent thrush.      Received C4 2/2/22 with same 10% dose reduction. Had more significant neurotoxicity so only received 5.5 days. Symptoms resolved after stopping Ifosfamide.      CT 3/8/22 with positive response to treatment.      He was on Doxil alone but has been dealing with worsening mouth and skin toxicity and has required delays and dose reductions. CT 8/8/22 with disease progression.    Switched to Gemzar 8/10/22.     Interval History:  [place] tolerated Gemzar well. He continues to take Zofran for nausea which is minimal. He did have issues with mouth sores after treatment and has been doing salt/soda swishes. No signs of thrush. Eating has been harder with the mouth irritation but he is maintaining his weight. No concerns with hydration. No fevers, headaches, respiratory concerns, GI issues. Does note more swelling but not bothersome. Fatigue after treatment better now. Tumor discomfort flared after chemo but now is minimal. [place].     Current Outpatient Medications   Medication Sig Dispense Refill     clotrimazole (MYCELEX) 10 MG lozenge Place 1 lozenge (10 mg) inside cheek 5 times daily 90 lozenge 1     escitalopram (LEXAPRO) 10 MG tablet Take 1 tablet (10 mg) by mouth daily 90 tablet 3     guaiFENesin-codeine (GUAIFENESIN AC) 100-10 MG/5ML syrup Take 10 mLs by mouth every 4 hours as needed for cough (Patient taking differently: Take 10 mLs by mouth every 4 hours as needed for cough prn) 118 mL 0     hydrocortisone (CORTEF) 10 MG tablet Take 20 mg in the morning and 10 mg in the afternoon 270 tablet 3     Insulin Syringe-Needle U-100 27.5G X 5/8\" 2 ML MISC 1 each daily as needed (with solucortef for adrenal crisis) 10 each 1     " levothyroxine (SYNTHROID/LEVOTHROID) 75 MCG tablet Take 1 tablet (75 mcg) by mouth every morning 90 tablet 3     Menthol-Methyl Salicylate (DALIA MALIK GREASELESS) cream Apply topically 2 times daily       metroNIDAZOLE (METROGEL) 1 % external gel Apply topically daily .Try stronger dose due to increased symptoms after chemo. 30 g 0     omeprazole (PRILOSEC) 20 MG DR capsule Take 1 capsule (20 mg) by mouth 2 times daily 60 capsule 1     ondansetron (ZOFRAN) 4 MG tablet Take 1-2 tablets (4-8 mg) by mouth every 8 hours as needed for nausea 60 tablet 3     phosphorus tablet 250 mg (PHOSPHA 250 NEUTRAL) 250 MG per tablet Take 1 tablet (250 mg) by mouth 2 times daily 60 tablet 3     potassium chloride ER (KLOR-CON M) 20 MEQ CR tablet Take 1 tablet (20 mEq) by mouth daily 90 tablet 3     prochlorperazine (COMPAZINE) 5 MG tablet Take 1 tablet (5 mg) by mouth every 6 hours as needed for nausea or vomiting . Caution: causes sedation. (Patient taking differently: Take 5 mg by mouth every 6 hours as needed for nausea or vomiting . Caution: causes sedation. PRN) 30 tablet 1     SUMAtriptan (IMITREX) 50 MG tablet Take 1 tablet (50 mg) by mouth at onset of headache for migraine May repeat in 2 hours. Max 4 tablets/24 hours. (Patient taking differently: Take 50 mg by mouth at onset of headache for migraine May repeat in 2 hours. Max 4 tablets/24 hours. PRN) 10 tablet 3     triamcinolone (KENALOG) 0.1 % external cream Apply topically 2 times daily to bothersome skin areas. 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     Thyroid disease     removed pituitary gland     Past Surgical History:   Procedure Laterality Date     COLONOSCOPY N/A 12/17/2020    Procedure: COLONOSCOPY;  Surgeon: Ken Camacho MD;  Location: [facility]     ENT SURGERY       HERNIA REPAIR       INSERT PORT VASCULAR ACCESS Right 1/28/2022     Procedure: INSERTION, VASCULAR ACCESS PORT;  Surgeon: Daniel Kinney MD;  Location: [facility] OR     IR CHEST PORT PLACEMENT > 5 YRS OF AGE  1/28/2022     LARYNGOSCOPY, EXCISE VOCAL CORD LESION MICROSCOPIC, COMBINED Left 07/01/2021    Procedure: MICROLARYNGOSCOPY, LEFT TRUE VOCAL CORD INJECTION WITH PROLARYN;  Surgeon: Kyree Bearden MD;  Location: [facility]     PHACOEMULSIFICATION WITH STANDARD INTRAOCULAR LENS IMPLANT Right 03/10/2021    Procedure: Cataract removal with implant.;  Surgeon: Jamir Mac MD;  Location: [facility]     PHACOEMULSIFICATION WITH STANDARD INTRAOCULAR LENS IMPLANT Left 04/05/2021    Procedure: Cataract removal with implant.;  Surgeon: Jamir Mac MD;  Location: [facility]     PICC DOUBLE LUMEN PLACEMENT Right 12/01/2021    5FR DL PICC, basilic vein. L-38cm, 1cm out.     PICC DOUBLE LUMEN PLACEMENT Right 01/04/2022    Right cephalic, 41 cm, 1 external length     PITUITARY EXCISION       tooth pulled 4/7  Right      Allergies   Allergen Reactions     Penicillins Hives and Swelling            Social History   Social History     Tobacco Use     Smoking status: Never Smoker     Smokeless tobacco: Never Used   Substance Use Topics     Alcohol use: Yes     Comment: rare     Drug use: Never      Past medical history and social history were reviewed.    Physical Examination:  There were no vitals taken for this visit.  Wt Readings from Last 10 Encounters:   08/10/22 63.4 kg (139 lb 11.2 oz)   07/12/22 60.4 kg (133 lb 3.2 oz)   06/27/22 60.3 kg (133 lb)   06/09/22 62.1 kg (137 lb)   04/26/22 64 kg (141 lb 3.2 oz)   04/07/22 64.7 kg (142 lb 9.6 oz)   03/25/22 66.2 kg (146 lb)   03/10/22 66.2 kg (146 lb)   02/08/22 63 kg (138 lb 14.4 oz)   01/28/22 65.8 kg (145 lb)     Video physical exam  General: Patient appears well in no acute distress.   Skin: No visualized rash or lesions on visualized skin  Eyes: EOMI, no erythema, sclera icterus or discharge noted  Resp: Appears to be breathing  comfortably without accessory muscle usage, speaking in full sentences, no cough  MSK: Appears to have normal range of motion based on visualized movements  Neurologic: No apparent tremors, facial movements symmetric  Psych: affect normal, alert and oriented    Laboratory Data:   Latest Reference Range & Units 08/17/22 10:59   Sodium 133 - 144 mmol/L 142   Potassium 3.4 - 5.3 mmol/L 3.5   Chloride 94 - 109 mmol/L 110 (H)   Carbon Dioxide 20 - 32 mmol/L 27   Urea Nitrogen 7 - 30 mg/dL 17   Creatinine 0.66 - 1.25 mg/dL 0.92   GFR Estimate >60 mL/min/1.73m2 87   Calcium 8.5 - 10.1 mg/dL 8.4 (L)   Anion Gap 3 - 14 mmol/L 5   Magnesium 1.6 - 2.3 mg/dL 2.2   Phosphorus 2.5 - 4.5 mg/dL 2.8   Albumin 3.4 - 5.0 g/dL 3.0 (L)   Protein Total 6.8 - 8.8 g/dL 6.3 (L)   Alkaline Phosphatase 40 - 150 U/L 146   ALT 0 - 70 U/L 37   AST 0 - 45 U/L 27   Bilirubin Total 0.2 - 1.3 mg/dL 0.2   Glucose 70 - 99 mg/dL 101 (H)   WBC 4.0 - 11.0 10e3/uL 2.4 (L)   Hemoglobin 13.3 - 17.7 g/dL 8.4 (L)   Hematocrit 40.0 - 53.0 % 26.8 (L)   Platelet Count 150 - 450 10e3/uL 108 (L)   RBC Count 4.40 - 5.90 10e6/uL 2.82 (L)   MCV 78 - 100 fL 95   MCH 26.5 - 33.0 pg 29.8   MCHC 31.5 - 36.5 g/dL 31.3 (L)   RDW 10.0 - 15.0 % 15.1 (H)   % Neutrophils % 46   % Lymphocytes % 43   % Monocytes % 6   % Eosinophils % 0   % Basophils % 5   Absolute Basophils 0.0 - 0.2 10e3/uL 0.1   Absolute Neutrophil 1.6 - 8.3 10e3/uL 1.1 (L)   Absolute Lymphocytes 0.8 - 5.3 10e3/uL 1.0   Absolute Monocytes 0.0 - 1.3 10e3/uL 0.1   Absolute Eosinophils 0.0 - 0.7 10e3/uL 0.0   (H): Data is abnormally high  (L): Data is abnormally low      Assessment and Plan:  1. 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 High PD-L1. Was on Keytruda but had progression, started Doxil + Ifos started 10/26/21. Completed 4 cycles with positive response but had significant toxicities (nausea/vomiting, dehydration, neurotoxicity, mucositis, skin toxicity,  pancytopenia, hypokalemia, hypophosphatemia). Was on Doxil alone March 2022-July 2022.     Due to disease progression switched to Gemzar 8/10/22. Tolerating well. Will get delayed day 8 later this week (delayed for family vacation).    Plan for Gemcitabine 675mg/m2 day 1 day 8 every 21 days. See MARLEEN prior to each cycle. Repeat imaging in October.      2. GI  Dyspepsia: Continue Omeprazole 40mg daily. Continue Tums PRN     CINV: Zofran PRN.      3. 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 Tao. Stable.     Hypothyroidism continue Synthroid 75mcg daily, endo following      4. Derm  Rosacea: Long standing issue, continue Metrogel PRN     Hand/foot: 2/2 Doxil, improved. No issues now that he is off Doxil.     5. MSK  Left shoulder/back/chest wall pain 2/2 tumor, following with palliative. Minimal pain currently, off all pain medications.  Tylenol PRN.     6. ENT  Hoarseness: Thought 2/2 mediastinal mass vs irritation from prior biopsy. Following with ENT, s/p vocal cord injection 7/1/21. Following with voice clinic.      Mucositis/Thrush: Has been a significant issue with treatment. Thrush now resolved but having mucositis which is uncommon with Gemzar. Possibly from neutropenia? Continue salt/soda swishes, MMW, and Healios.     7. Pulm/Cards  Working with speech on laryngreal dysfunction.     SOB improved. CT stable. Echo stable.      Continue Guaifenesin-[place] PRN for cough, much improved.       Off statin due to LFT elevation, improved.      8. FEN  Hypokalemia: Recurrent issue, continue 20meq daily.      Hypophosphatemia: Continue 250mg phos BID for recurrent issues.      Renal insufficiency: Doing better, creat WNL now. Continue IVF PRN dehydration.      Previously referred to medical cannabis program for poor appetite related to malignancy/treatment. Not discussed today.      9. Psych/Neuro  Anxiety/depression: Improved, on Lexapro and feels like it is helping.  Continue.      Continue PRN Imitrex for migraines.      10. Heme  Pancytopenia 2/2 chemotherapy. Monitor. Transfuse if hgb <8. Defer chemo if plt <75 or ANC .     25 minutes spent on the date of the encounter doing chart review, review of test results, patient visit and documentation     Maynor Rios PA-C  Department of Hematology and Oncology  [facility]         Again, thank you for allowing me to participate in the care of your patient.        Sincerely,        GERALDO Mullins

## 2022-08-25 NOTE — PROGRESS NOTES
Infusion Nursing Note:  Ifrah NORMA Lagi presents today for Gemzar    Patient seen by provider today: No   present during visit today: Not Applicable.    Note: N/A.    Intravenous Access:  Labs drawn without difficulty.  Implanted Port.    Treatment Conditions:  Lab Results   Component Value Date    HGB 9.2 (L) 08/25/2022    WBC 7.7 08/25/2022    ANEU 1.1 (L) 08/17/2022    ANEUTAUTO 5.5 08/25/2022     08/25/2022      Lab Results   Component Value Date     08/25/2022    POTASSIUM 3.7 08/25/2022    MAG 2.3 08/25/2022    CR 1.09 08/25/2022    COTY 9.0 08/25/2022    BILITOTAL 0.2 08/25/2022    ALBUMIN 3.1 (L) 08/25/2022    ALT 28 08/25/2022    AST 19 08/25/2022     Results reviewed, labs MET treatment parameters, ok to proceed with treatment.    Post Infusion Assessment:  Patient tolerated infusion without incident.  Blood return noted pre and post infusion.  Site patent and intact, free from redness, edema or discomfort.  No evidence of extravasations.  Access discontinued per protocol.     Discharge Plan:   Patient discharged in stable condition accompanied by: self.  Departure Mode: Ambulatory.  Pt to return on 9/1/22 at 9:45 am for pac labs followed by fluids.      Cathy Mann RN

## 2022-09-01 NOTE — PROGRESS NOTES
Infusion Nursing Note:  Ifrah Huitron presents today for PAC labs and IVF.    Patient seen by provider today: No   present during visit today: Not Applicable.    Note: N/A.    Intravenous Access:  Implanted Port.    Treatment Conditions:  Not Applicable.    Post Infusion Assessment:  Patient tolerated infusion without incident.  Blood return noted pre and post infusion.  Site patent and intact, free from redness, edema or discomfort.  No evidence of extravasations.  Access discontinued per protocol.     Discharge Plan:   Discharge instructions reviewed with: Patient.  Patient discharged in stable condition accompanied by: self.  Departure Mode: Ambulatory.      Sabrina Castro RN

## 2022-09-07 NOTE — PROGRESS NOTES
Ifrah is a 72 year old who is being evaluated via a billable video visit.      How would you like to obtain your AVS? MyChart  If the video visit is dropped, the invitation should be resent by: Text to cell phone: [phone]  Will anyone else be joining your video visit? Yes: Wife Rachel. How would they like to receive their invitation? Other e-mail: Wife    Austin Rice VF    Video-Visit Details    Video Start Time: 1415    Type of service:  Video Visit    Video End Time: 1415    Originating Location (pt. Location): Home    Distant Location (provider location):  [facility]     Platform used for Video Visit: [facility]    Oncology/Hematology Visit Note  Sep 7, 2022    Reason for Visit: Follow up of spindle cell sarcoma     History of Present Illness: Ifrah Huitron is a 72 year old male with PMH rosacea, pituitary macroadenoma s/p resection, GERD, kidney stones, DJD with metastatic spindle cell sarcoma. He presented to his PCP March 2021 with chest pain/left shoulder and back pain that led to imaging which revealed multiple lung mets. There were difficulties in getting diagnostic biopsy, eventually biopsy of mediastinal mass with spindle cell sarcoma. There was high PD-L1 expression (90%). Baseline imaging 6/21/21 with progression of lung mets and left-sided subdiaphragmatic mass, stable liver lesions. 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      He started Keytruda 6/21/21. CT 8/2/21 with positive response to treatment. CT 10/18/21 with mixed response- LUQ mass larger, anterior mediastinal and left anterior pleural mass smaller. Keytruda stopped.     Started on Doxil + Ifosfamide 10/26/21. Poorly tolerated with mucositis, nausea, poor oral intake. CT with stable to positive response.      Received C2 12/1/21 (delayed for tolerance issues) with 10% dose reduction.     Received C3 1/4/22 with same 10%  dose reduction. He had issues with nausea inpatient along with recurrent thrush.      Received C4 2/2/22 with same 10% dose reduction. Had more significant neurotoxicity so only received 5.5 days. Symptoms resolved after stopping Ifosfamide.      CT 3/8/22 with positive response to treatment.      He was on Doxil alone but has been dealing with worsening mouth and skin toxicity and has required delays and dose reductions. CT 8/8/22 with disease progression.     Switched to Gemzar 8/10/22.    Interval History:  No acute events. After chemo he felt very fatigued for a day about 3 days after. He also describes worsening back pain after treatment that improves before next dose. It is similar to ongoing disease pain but more distal. No associated neurologic symptoms or dysuria.Has ongoing nocturia but would like to continue IVF weekly as he feels a benefit.  He continues to struggle with oral intake and weight loss due to mucositis pain. He has fluctuating ulcers and generalized discomfort. He continues Salt/Soda rinses.     Review of Systems:  A complete review of systems was otherwise negative except as noted in the HPI.     Past medical history and social history were reviewed.    Physical Examination:  Constitutional: Well-appearing male in no acute distress.  Mental: Calm, cooperative, appropriate.  Respiratory: Breathing comfortably on room air.  Neurologic: Cranial nerves II through XII are grossly intact.  Skin: No visible rashes to face.     Laboratory Data:  Prior labs reviewed, CBC and CMP to be drawn tomorrow prior to chemo      Assessment and Plan:  Mr. Huitron is a 72 year old male with metastatic, progressive spindle cell sarcoma currently on Gemzar monotherapy.     # Metastatic spindle cell sarcoma  # Pancytopenia secondary to chemotherapy  - Continue Gemzar days 1 and 8 of 21 day cycle   - Okay to proceed with cycle 2 day 1 tomorrow pending lab parameters  - Tolerating well  - Virtual visit with MARLEEN prior  to each cycle  - Repeat CT following cycle 3  - Dr. Wolff without appointment availability until 11/2022, therefore CT scan results to be discussed with Maynor LAUGHLIN      # Mucositis  - Continue Salt/Soda rinses, Magic Mouthwash, and Healios  - Due to refractory, prolonged symptom and associated weight loss, trial dex oral rinse and if not helpful, trial doxepin oral rinse     # Renal Insufficiency, improved  # Anorexia  - Continue IVF weekly at local infusion center     # Dyspepsia  # Nausea  - Continue PPI every day, TUMS PRN, and Zofran PRN     # Hypopituitarism tatus post pituitary resection  # Hypothyroidism  - Follows with Dr. Tao in endocrinology  - Continue Synthroid and hydrocortisione     # Rosacea  - Continue Metrogel PRN     # Pain Cancer Related  - Continue Bengay and Tylenol PRN     # Hoarseness  - Follows with ENT and voice clinic  - Likely 2/2 mass versus irritation secondary to biopsy  - Status post vocal cord injection 7/1/21     # Hypokalemia and hypophosphatemia, improved  - Continue KCl 20 mEq every day  - Continue phos 250 mg BID      # Anxiety Depression  - Continue Lexapro  Anxiety/depression: Improved, on Lexapro and feels like it is helping. Continue.      # Migraines  - Continue Imitrex PRN    25 minutes spent on the date of the encounter doing chart review, review of test results, interpretation of tests, patient visit and documentation      Isis Brennan PA-C  Department of Hematology and Oncology  [facility]     I saw the patient in conjunction with Isis Brennan PA-C and agree with her assessment and documentation.     Maynor Rios PA-C

## 2022-09-07 NOTE — NURSING NOTE
Patient declined individual allergy and medication review by support staff because pt states nothing has changed since last reviewed.    Austin RODRIGUEZ

## 2022-09-07 NOTE — LETTER
9/7/2022         RE: Ifrah Huitron  [address] [place] [address]        Dear Colleague,    Thank you for referring your patient, Ifrah Huitron, to the [facility]. Please see a copy of my visit note below.    Ifrah is a 72 year old who is being evaluated via a billable video visit.      How would you like to obtain your AVS? MyChart  If the video visit is dropped, the invitation should be resent by: Text to cell phone: [phone]  Will anyone else be joining your video visit? Yes: Wife Rachel. How would they like to receive their invitation? Other e-mail: Wife    Austin Rice VF    Video-Visit Details    Video Start Time: 1415    Type of service:  Video Visit    Video End Time: 1415    Originating Location (pt. Location): Home    Distant Location (provider location):  [facility]     Platform used for Video Visit: [employer]    Oncology/Hematology Visit Note  Sep 7, 2022    Reason for Visit: Follow up of spindle cell sarcoma     History of Present Illness: Ifrah Huitron is a 72 year old male with PMH rosacea, pituitary macroadenoma s/p resection, GERD, kidney stones, DJD with metastatic spindle cell sarcoma. He presented to his PCP March 2021 with chest pain/left shoulder and back pain that led to imaging which revealed multiple lung mets. There were difficulties in getting diagnostic biopsy, eventually biopsy of mediastinal mass with spindle cell sarcoma. There was high PD-L1 expression (90%). Baseline imaging 6/21/21 with progression of lung mets and left-sided subdiaphragmatic mass, stable liver lesions. 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      He started Keytruda 6/21/21. CT 8/2/21 with positive response to treatment. CT 10/18/21 with mixed response- LUQ mass larger, anterior mediastinal and left anterior pleural mass smaller. Keytruda  stopped.     Started on Doxil + Ifosfamide 10/26/21. Poorly tolerated with mucositis, nausea, poor oral intake. CT with stable to positive response.      Received C2 12/1/21 (delayed for tolerance issues) with 10% dose reduction.     Received C3 1/4/22 with same 10% dose reduction. He had issues with nausea inpatient along with recurrent thrush.      Received C4 2/2/22 with same 10% dose reduction. Had more significant neurotoxicity so only received 5.5 days. Symptoms resolved after stopping Ifosfamide.      CT 3/8/22 with positive response to treatment.      He was on Doxil alone but has been dealing with worsening mouth and skin toxicity and has required delays and dose reductions. CT 8/8/22 with disease progression.     Switched to Gemzar 8/10/22.    Interval History:  No acute events. After chemo he felt very fatigued for a day about 3 days after. He also describes worsening back pain after treatment that improves before next dose. It is similar to ongoing disease pain but more distal. No associated neurologic symptoms or dysuria.Has ongoing nocturia but would like to continue IVF weekly as he feels a benefit.  He continues to struggle with oral intake and weight loss due to mucositis pain. He has fluctuating ulcers and generalized discomfort. He continues Salt/Soda rinses.     Review of Systems:  A complete review of systems was otherwise negative except as noted in the HPI.     Past medical history and social history were reviewed.    Physical Examination:  Constitutional: Well-appearing male in no acute distress.  Mental: Calm, cooperative, appropriate.  Respiratory: Breathing comfortably on room air.  Neurologic: Cranial nerves II through XII are grossly intact.  Skin: No visible rashes to face.     Laboratory Data:  Prior labs reviewed, CBC and CMP to be drawn tomorrow prior to chemo      Assessment and Plan:  Mr. Huitron is a 72 year old male with metastatic, progressive spindle cell sarcoma currently on  Gemzar monotherapy.     # Metastatic spindle cell sarcoma  # Pancytopenia secondary to chemotherapy  - Continue Gemzar days 1 and 8 of 21 day cycle   - Okay to proceed with cycle 2 day 1 tomorrow pending lab parameters  - Tolerating well  - Virtual visit with MARLEEN prior to each cycle  - Repeat CT following cycle 3  - Dr. Wolff without appointment availability until 11/2022, therefore CT scan results to be discussed with Maynor LAUGHLIN      # Mucositis  - Continue Salt/Soda rinses, Magic Mouthwash, and Healios  - Due to refractory, prolonged symptom and associated weight loss, trial dex oral rinse and if not helpful, trial doxepin oral rinse     # Renal Insufficiency, improved  # Anorexia  - Continue IVF weekly at local infusion center     # Dyspepsia  # Nausea  - Continue PPI every day, TUMS PRN, and Zofran PRN     # Hypopituitarism tatus post pituitary resection  # Hypothyroidism  - Follows with Dr. Tao in endocrinology  - Continue Synthroid and hydrocortisione     # Rosacea  - Continue Metrogel PRN     # Pain Cancer Related  - Continue Bengay and Tylenol PRN     # Hoarseness  - Follows with ENT and voice clinic  - Likely 2/2 mass versus irritation secondary to biopsy  - Status post vocal cord injection 7/1/21     # Hypokalemia and hypophosphatemia, improved  - Continue KCl 20 mEq every day  - Continue phos 250 mg BID      # Anxiety Depression  - Continue Lexapro  Anxiety/depression: Improved, on Lexapro and feels like it is helping. Continue.      # Migraines  - Continue Imitrex PRN    25 minutes spent on the date of the encounter doing chart review, review of test results, interpretation of tests, patient visit and documentation      Isis Brennan PA-C  Department of Hematology and Oncology  [facility]     I saw the patient in conjunction with Isis Brennan PA-C and agree with her assessment and documentation.     Maynor Rios PA-C        Again, thank you for allowing me to  participate in the care of your patient.        Sincerely,        GERALDO Mullins

## 2022-09-07 NOTE — LETTER
9/7/2022         RE: Ifrah Huitron  [address] [place] [address]        Dear Colleague,    Thank you for referring your patient, Ifrah Huitron, to the [facility]. Please see a copy of my visit note below.    Ifrah is a 72 year old who is being evaluated via a billable video visit.      How would you like to obtain your AVS? MyChart  If the video visit is dropped, the invitation should be resent by: Text to cell phone: [phone]  Will anyone else be joining your video visit? Yes: Wife Rachel. How would they like to receive their invitation? Other e-mail: Wife    Austin Rice VF    Video-Visit Details    Video Start Time: 1415    Type of service:  Video Visit    Video End Time: 1415    Originating Location (pt. Location): Home    Distant Location (provider location):  [facility]     Platform used for Video Visit: [employer]    Oncology/Hematology Visit Note  Sep 7, 2022    Reason for Visit: Follow up of spindle cell sarcoma     History of Present Illness: Ifrah Huitron is a 72 year old male with PMH rosacea, pituitary macroadenoma s/p resection, GERD, kidney stones, DJD with metastatic spindle cell sarcoma. He presented to his PCP March 2021 with chest pain/left shoulder and back pain that led to imaging which revealed multiple lung mets. There were difficulties in getting diagnostic biopsy, eventually biopsy of mediastinal mass with spindle cell sarcoma. There was high PD-L1 expression (90%). Baseline imaging 6/21/21 with progression of lung mets and left-sided subdiaphragmatic mass, stable liver lesions. 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      He started Keytruda 6/21/21. CT 8/2/21 with positive response to treatment. CT 10/18/21 with mixed response- LUQ mass larger, anterior mediastinal and left anterior pleural mass smaller. Keytruda  stopped.     Started on Doxil + Ifosfamide 10/26/21. Poorly tolerated with mucositis, nausea, poor oral intake. CT with stable to positive response.      Received C2 12/1/21 (delayed for tolerance issues) with 10% dose reduction.     Received C3 1/4/22 with same 10% dose reduction. He had issues with nausea inpatient along with recurrent thrush.      Received C4 2/2/22 with same 10% dose reduction. Had more significant neurotoxicity so only received 5.5 days. Symptoms resolved after stopping Ifosfamide.      CT 3/8/22 with positive response to treatment.      He was on Doxil alone but has been dealing with worsening mouth and skin toxicity and has required delays and dose reductions. CT 8/8/22 with disease progression.     Switched to Gemzar 8/10/22.    Interval History:  No acute events. After chemo he felt very fatigued for a day about 3 days after. He also describes worsening back pain after treatment that improves before next dose. It is similar to ongoing disease pain but more distal. No associated neurologic symptoms or dysuria.Has ongoing nocturia but would like to continue IVF weekly as he feels a benefit.  He continues to struggle with oral intake and weight loss due to mucositis pain. He has fluctuating ulcers and generalized discomfort. He continues Salt/Soda rinses.     Review of Systems:  A complete review of systems was otherwise negative except as noted in the HPI.     Past medical history and social history were reviewed.    Physical Examination:  Constitutional: Well-appearing male in no acute distress.  Mental: Calm, cooperative, appropriate.  Respiratory: Breathing comfortably on room air.  Neurologic: Cranial nerves II through XII are grossly intact.  Skin: No visible rashes to face.     Laboratory Data:  Prior labs reviewed, CBC and CMP to be drawn tomorrow prior to chemo      Assessment and Plan:  Mr. Huitron is a 72 year old male with metastatic, progressive spindle cell sarcoma currently on  Gemzar monotherapy.     # Metastatic spindle cell sarcoma  # Pancytopenia secondary to chemotherapy  - Continue Gemzar days 1 and 8 of 21 day cycle   - Okay to proceed with cycle 2 day 1 tomorrow pending lab parameters  - Tolerating well  - Virtual visit with MARLEEN prior to each cycle  - Repeat CT following cycle 3  - Dr. Wolff without appointment availability until 11/2022, therefore CT scan results to be discussed with Maynor LAUGHLIN      # Mucositis  - Continue Salt/Soda rinses, Magic Mouthwash, and Healios  - Due to refractory, prolonged symptom and associated weight loss, trial dex oral rinse and if not helpful, trial doxepin oral rinse     # Renal Insufficiency, improved  # Anorexia  - Continue IVF weekly at local infusion center     # Dyspepsia  # Nausea  - Continue PPI every day, TUMS PRN, and Zofran PRN     # Hypopituitarism tatus post pituitary resection  # Hypothyroidism  - Follows with Dr. Tao in endocrinology  - Continue Synthroid and hydrocortisione     # Rosacea  - Continue Metrogel PRN     # Pain Cancer Related  - Continue Bengay and Tylenol PRN     # Hoarseness  - Follows with ENT and voice clinic  - Likely 2/2 mass versus irritation secondary to biopsy  - Status post vocal cord injection 7/1/21     # Hypokalemia and hypophosphatemia, improved  - Continue KCl 20 mEq every day  - Continue phos 250 mg BID      # Anxiety Depression  - Continue Lexapro  Anxiety/depression: Improved, on Lexapro and feels like it is helping. Continue.      # Migraines  - Continue Imitrex PRN    25 minutes spent on the date of the encounter doing chart review, review of test results, interpretation of tests, patient visit and documentation      Isis Brennan PA-C  Department of Hematology and Oncology  [facility]     I saw the patient in conjunction with Isis Brennan PA-C and agree with her assessment and documentation.     Maynor Rios PA-C        Again, thank you for allowing me to  participate in the care of your patient.        Sincerely,        GERALDO Mullins

## 2022-09-08 NOTE — PROGRESS NOTES
Infusion Nursing Note:  Ifrah Huitron presents today for Gemzar.    Patient seen by provider today: No   present during visit today: Not Applicable.    Note: N/A.    Intravenous Access:  Labs drawn without difficulty.  Implanted Port.    Treatment Conditions:  Lab Results   Component Value Date    HGB 8.1 (L) 09/08/2022    WBC 8.3 09/08/2022    ANEU 1.1 (L) 08/17/2022    ANEUTAUTO 6.5 09/08/2022     09/08/2022      Lab Results   Component Value Date     09/08/2022    POTASSIUM 4.2 09/08/2022    MAG 2.1 09/01/2022    CR 1.22 (H) 09/08/2022    COTY 8.4 (L) 09/08/2022    BILITOTAL 0.2 09/08/2022    ALBUMIN 3.6 09/08/2022    ALT 24 09/08/2022    AST 32 09/08/2022     Results reviewed, labs MET treatment parameters, ok to proceed with treatment.    Post Infusion Assessment:  Patient tolerated infusion without incident.  Blood return noted pre and post infusion.  Site patent and intact, free from redness, edema or discomfort.  No evidence of extravasations.  Access discontinued per protocol.     Discharge Plan:   Discharge instructions reviewed with: Patient.  Patient discharged in stable condition accompanied by: self.  Departure Mode: Ambulatory.      Sabrina Castro RN

## 2022-09-18 NOTE — ED TRIAGE NOTES
Pt here with hands, legs and feet swelling since chemo treatment last Thursday. Pt reports temp 100.5 at home. Pt was told to come in for increased swelling or temp greater than 100.3. pt has body aches all over and headache.      Triage Assessment     Row Name 09/18/22 0986       Triage Assessment (Adult)    Airway WDL WDL       Respiratory WDL    Respiratory WDL WDL       Cardiac WDL    Cardiac WDL WDL       Cognitive/Neuro/Behavioral WDL    Cognitive/Neuro/Behavioral WDL WDL

## 2022-09-18 NOTE — ED PROVIDER NOTES
History     Chief Complaint   Patient presents with     Medication Reaction     After chemo last Thursday     HPI  Ifrah Huitron is a 72 year old male, past medical history is significant for secondary adrenal insufficiency, anemia in neoplastic disease, history of chemotherapy induced neutropenia, hypophosphatemia, malignant mesothelioma, generalized anxiety disorder, osteoarthritis, epidural lipomatosis, chronic low back pain, hypopituitarism, central hypothyroidism, spindle cell sarcoma, presents to the emergency department with concerns of possible reaction after chemotherapy last Thursday with presentation on Sunday.  History is obtained from the patient who presents with his wife with concerns of possible reaction to chemotherapy in the form of Gemzar given last Thursday also received a 1 unit packed cell transfusion at that time.  Patient states that shortly after completing the chemotherapy he began to have some subtle swelling in bilateral lower extremities as well as his right hand, not really painful but burning, warm in the lower extremity.  No increased difficulties with swallowing or breathing, he does have a history of mucositis for which she has medication which he finds minimally helpful.  He had a fever at home of 100.5 and was directed by oncology to report to the emergency department should he develop the symptoms.  He denies any chest pain or cough, no shortness of breath above baseline.  No urinary tract symptoms.  No change in bowel habit.      Allergies:  Allergies   Allergen Reactions     Penicillins Hives and Swelling              Problem List:    Patient Active Problem List    Diagnosis Date Noted     Secondary adrenal insufficiency (H) 04/25/2022     Priority: Medium     History of pituitary surgery 04/25/2022     Priority: Medium     Anemia in neoplastic disease 02/14/2022     Priority: Medium     Thrush 01/11/2022     Priority: Medium     Encounter for other specified aftercare  12/07/2021     Priority: Medium     Sarcoma (H) 12/01/2021     Priority: Medium     Chemotherapy-induced neutropenia (H) 11/04/2021     Priority: Medium     Chemotherapy-induced nausea 11/02/2021     Priority: Medium     Hypophosphatemia 11/02/2021     Priority: Medium     Anemia 11/02/2021     Priority: Medium     Vocal fold paralysis, left 06/21/2021     Priority: Medium     Added automatically from request for surgery [id number]       Dysphonia 06/21/2021     Priority: Medium     Added automatically from request for surgery [id number]       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     Priority: Medium     Added automatically from request for surgery [id number]       Mediastinal lymphadenopathy 06/21/2021     Priority: Medium     Added automatically from request for surgery [id number]       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     Priority: Medium     Mesothelioma, malignant (H) 03/26/2021     Priority: Medium     TUYET (generalized anxiety disorder) 05/23/2017     Priority: Medium     Degeneration of lumbar or lumbosacral intervertebral disc 01/04/2016     Priority: Medium     Osteoarthritis of spine with radiculopathy, lumbar region 01/04/2016     Priority: Medium     Epidural lipomatosis 12/03/2015     Priority: Medium     Chronic lower back pain 12/03/2015     Priority: Medium     Hypopituitarism (H) 08/25/2010     Priority: Medium     Central hypothyroidism 08/16/2010     Priority: Medium     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 Thyroid started 4/10  Low cortisol [1] 5/7/10 so secondary adrenal insufficiency  hypophysectomy 8/23/10    Formatting of this note might be different from the original.  1.9 cm  Formatting of this note might be different from the original.  Macroadenoma of 1.9 cm in largest dimension noted 4/10  Likely central thyroid and HGH deficiency. Thyroid started 4/10  Low cortisol  [1] 5/7/10 so secondary adrenal insufficiency  hypophysectomy 8/23/10       Eczema 01/12/2009     Priority: Medium     Calculus of kidney 09/09/2004     Priority: Medium     Rosacea 09/09/2004     Priority: Medium     Inguinal hernia 09/09/2004     Priority: Medium     Formatting of this note might be different from the original.  Epic          Past Medical History:    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     Thyroid disease        Past Surgical History:    Past Surgical History:   Procedure Laterality Date     COLONOSCOPY N/A 12/17/2020    Procedure: COLONOSCOPY;  Surgeon: Ken Camacho MD;  Location: [facility]     ENT SURGERY       HERNIA REPAIR       INSERT PORT VASCULAR ACCESS Right 1/28/2022    Procedure: INSERTION, VASCULAR ACCESS PORT;  Surgeon: Daniel Kinney MD;  Location: [facility] OR     IR CHEST PORT PLACEMENT > 5 YRS OF AGE  1/28/2022     LARYNGOSCOPY, EXCISE VOCAL CORD LESION MICROSCOPIC, COMBINED Left 07/01/2021    Procedure: MICROLARYNGOSCOPY, LEFT TRUE VOCAL CORD INJECTION WITH PROLARYN;  Surgeon: Kyree Bearden MD;  Location: [facility]     PHACOEMULSIFICATION WITH STANDARD INTRAOCULAR LENS IMPLANT Right 03/10/2021    Procedure: Cataract removal with implant.;  Surgeon: Jamir Mac MD;  Location: [facility]     PHACOEMULSIFICATION WITH STANDARD INTRAOCULAR LENS IMPLANT Left 04/05/2021    Procedure: Cataract removal with implant.;  Surgeon: Jamir Mac MD;  Location: [facility]     PICC DOUBLE LUMEN PLACEMENT Right 12/01/2021    5FR DL PICC, basilic vein. L-38cm, 1cm out.     PICC DOUBLE LUMEN PLACEMENT Right 01/04/2022    Right cephalic, 41 cm, 1 external length     PITUITARY EXCISION       tooth pulled 4/7  Right        Family History:    Family History   Problem Relation Age of Onset     Lupus Mother      ALS Father      Rheumatoid Arthritis Sister        Social History:  Marital Status:   " [2]  Social History     Tobacco Use     Smoking status: Never Smoker     Smokeless tobacco: Never Used   Substance Use Topics     Alcohol use: Yes     Comment: rare     Drug use: Never        Medications:    clotrimazole (MYCELEX) 10 MG lozenge  dexamethasone (DECADRON) 0.5 MG/5ML elixir  doxepin (SINEQUAN) 10 MG/ML (HIGH CONC) solution  escitalopram (LEXAPRO) 10 MG tablet  guaiFENesin-codeine (GUAIFENESIN AC) 100-10 MG/5ML syrup  hydrocortisone (CORTEF) 10 MG tablet  levothyroxine (SYNTHROID/LEVOTHROID) 75 MCG tablet  Menthol-Methyl Salicylate (DALIA MALIK GREASELESS) cream  metroNIDAZOLE (METROGEL) 1 % external gel  omeprazole (PRILOSEC) 20 MG DR capsule  ondansetron (ZOFRAN) 4 MG tablet  phosphorus tablet 250 mg (PHOSPHA 250 NEUTRAL) 250 MG per tablet  potassium chloride ER (KLOR-CON M) 20 MEQ CR tablet  prochlorperazine (COMPAZINE) 5 MG tablet  SUMAtriptan (IMITREX) 50 MG tablet  triamcinolone (KENALOG) 0.1 % external cream  Insulin Syringe-Needle U-100 27.5G X 5/8\" 2 ML MISC          Review of Systems   Constitutional: Positive for activity change and appetite change.   HENT: Negative.    Eyes: Negative.    Respiratory: Negative.    Cardiovascular: Negative.    Gastrointestinal: Negative.    Endocrine: Negative.    Genitourinary: Negative.    Musculoskeletal: Negative.    Skin: Positive for rash.   Allergic/Immunologic: Negative.    Neurological: Negative.    Hematological: Negative.        Physical Exam   BP: 134/81  Pulse: 99  Temp: 98.6  F (37  C)  Resp: 18  Height: 170.2 cm (5' 7\")  Weight: 61.2 kg (135 lb)  SpO2: 99 %      Physical Exam  Vitals and nursing note reviewed.   Constitutional:       Appearance: He is normal weight.      Comments: Chronically ill in appearance   HENT:      Head: Normocephalic and atraumatic.      Right Ear: Tympanic membrane normal.      Left Ear: Tympanic membrane normal.      Nose: Nose normal.      Mouth/Throat:      Mouth: Mucous membranes are moist.   Eyes:      " Extraocular Movements: Extraocular movements intact.      Conjunctiva/sclera: Conjunctivae normal.      Pupils: Pupils are equal, round, and reactive to light.   Cardiovascular:      Rate and Rhythm: Normal rate and regular rhythm.      Pulses: Normal pulses.      Heart sounds: Normal heart sounds.   Pulmonary:      Effort: Pulmonary effort is normal.      Breath sounds: Normal breath sounds.   Abdominal:      General: Bowel sounds are normal.      Palpations: Abdomen is soft.   Musculoskeletal:      Cervical back: Normal range of motion and neck supple.      Comments: Bilateral lower extremities are notable for significant edema to about the tibial tuberosity indented by the patient's socks.  There is some erythema but minimal warmth, these changes are symmetric between the lower extremities.  The changes are more subtle less erythema in the right hand   Skin:     General: Skin is warm and dry.      Capillary Refill: Capillary refill takes less than 2 seconds.   Neurological:      General: No focal deficit present.      Mental Status: He is alert and oriented to person, place, and time.         ED Course          Labs Ordered and Resulted from Time of ED Arrival to Time of ED Departure   CRP INFLAMMATION - Abnormal       Result Value    CRP Inflammation 154.15 (*)    COMPREHENSIVE METABOLIC PANEL - Abnormal    Sodium 136      Potassium 3.7      Chloride 101      Carbon Dioxide (CO2) 26      Anion Gap 9      Urea Nitrogen 22.8      Creatinine 1.11      Calcium 8.2 (*)     Glucose 86      Alkaline Phosphatase 178 (*)     AST 24      ALT 30      Protein Total 5.6 (*)     Albumin 3.0 (*)     Bilirubin Total 1.4 (*)     GFR Estimate 70     LIPASE - Abnormal    Lipase 10 (*)    CBC WITH PLATELETS AND DIFFERENTIAL - Abnormal    WBC Count 11.0      RBC Count 2.70 (*)     Hemoglobin 8.2 (*)     Hematocrit 25.2 (*)     MCV 93      MCH 30.4      MCHC 32.5      RDW 17.1 (*)     Platelet Count 107 (*)     % Neutrophils 89       % Lymphocytes 9      % Monocytes 1      % Eosinophils 1      % Basophils 0      % Immature Granulocytes 0      NRBCs per 100 WBC 0      Absolute Neutrophils 9.9 (*)     Absolute Lymphocytes 1.0      Absolute Monocytes 0.1      Absolute Eosinophils 0.1      Absolute Basophils 0.0      Absolute Immature Granulocytes 0.0      Absolute NRBCs 0.0     UA MACROSCOPIC WITH REFLEX TO MICRO AND CULTURE - Abnormal    Color Urine Straw      Appearance Urine Clear      Glucose Urine Negative      Bilirubin Urine Negative      Ketones Urine Negative      Specific Gravity Urine 1.010      Blood Urine Negative      pH Urine 6.5      Protein Albumin Urine Trace (*)     Urobilinogen Urine 2.0      Nitrite Urine Negative      Leukocyte Esterase Urine Negative      RBC Urine <1      WBC Urine <1      Squamous Epithelials Urine <1     LACTIC ACID WHOLE BLOOD - Normal    Lactic Acid 0.7     COVID-19 VIRUS (CORONAVIRUS) BY PCR - Normal    SARS CoV2 PCR Negative     Narrative & Impression  EXAM: XR CHEST 2 VIEWS  LOCATION: [facility]  DATE/TIME: 9/18/2022 4:08 PM     INDICATION: Fever, recent chemotherapy, not neutropenic  COMPARISON: CT chest 08/08/2022                                                                      IMPRESSION: Negative chest.    3:04 PM  I reviewed Gemzar and adverse effects specifically.  Peripheral edema apparently will occur in about 20% of people as well as 30% developing a rash.    7:10 PM  Results back and reviewed in the room with the patient.  I phoned and spoke with oncology Dr. Greenberg on-call for ; he felt that the patient's rash and swelling in his lower extremities in temporal correlation with the administration of the Gemzar was likely consistent with pseudo cellulitis.  I think that given the clinical appearance of the rash and swelling that this is likely a reasonable explanation.  I am disinclined to place the patient on antibiotics especially given he is not  febrile here, describes really no constitutional symptoms, and that both the swelling and rash are common side effects with the chemotherapeutic agent that he received.  On-call oncology felt that not putting him on antibiotics was entirely reasonable.  The continue to follow him and if this gets worse can certainly consider placing him on antibiotics.       Procedures              Critical Care time:  none               No results found for this or any previous visit (from the past 24 hour(s)).    Medications   0.9% sodium chloride BOLUS (0 mLs Intravenous Stopped 9/18/22 1901)   acetaminophen (TYLENOL) tablet 1,000 mg (1,000 mg Oral Given 9/18/22 1909)     7:19 PM  Impression and discussion with oncology are reviewed in the room with the patient and he is comfortable with plan for disposition to home.  Assessments & Plan (with Medical Decision Making)   Assessments and plan with medical decision making at the time stamp above.      Disclaimer: This note consists of symbols derived from keyboarding, dictation and/or voice recognition software. As a result, there may be errors in the script that have gone undetected. Please consider this when interpreting information found in this chart.      I have reviewed the nursing notes.    I have reviewed the findings, diagnosis, plan and need for follow up with the patient.            Discharge Medication List as of 9/18/2022  7:36 PM          Final diagnoses:   Adverse effect of drug, initial encounter - Pseudo cellulitis       9/18/2022   [facility] EMERGENCY DEPT     Christian Bob MD  09/23/22 1952

## 2022-09-19 NOTE — DISCHARGE INSTRUCTIONS
"Continue all current medications.  The appearance of your legs is consistent with \"pseudo cellulitis\" with the Gemzar and your oncology team is aware of this.  If things get worse or you have further concerns please give them a call or return to the emergency department.  "

## 2022-09-21 NOTE — TELEPHONE ENCOUNTER
Called patient, unable to connect. [employer] message sent.    Jaja Moore, RN, BSN, PHN  M[facility]

## 2022-09-21 NOTE — TELEPHONE ENCOUNTER
Called Ifrah and discussed MyChart message. Reports that he used dexamethasone mouth wash twice a day for about 5 days but he felt sores were getting worse, so he stopped using it a few days ago. Has been using salt and baking soda rinses.     He reports that he can't see the sores in his mouth, but he can feel them getting bigger. They are mainly located on the inside of his cheeks. No bleeding noted. Eating is painful, but he is able to eat soft things like mac and cheese. Estimates he is drinking 4-6 cups of liquids per day. Rates the pain a 3 when not eating and a 6 when he is eating. No fevers since going to the ED on Sunday (highest temp since then was 99.8)    Prescription for doxepin was written on 9/7 and placed on hold for second line if no improvement with dexamethasone.     Will route to Maynor for recommendations.    Toma Noble, RN  Triage Nurse Advisor  [facility]

## 2022-09-21 NOTE — TELEPHONE ENCOUNTER
Maynor Rios PA  You 1 minute ago (1:40 PM)     AW    -Yes try Doxepin     -And please have him seen by MARLEEN in person in [place] or [place] to do evaluation. I can't see mouth sores over video visit and then chemo he is on shouldn't be causing mouth sores. Plus with ED visit he should have in person assessment to follow-up on swelling     Message text       Called pharmacy and requested that they fill doxepin. They stated that it will be ready for  by tomorrow afternoon. Informed [place] of this.    Patient scheduled for appointment on Monday, 9/26 with Malorie Castrejon for in person evaluation. Patient confirmed that appointment would work for him.

## 2022-09-22 NOTE — PROGRESS NOTES
Infusion Nursing Note:  Ifrah Huitron presents today for PAC labs and IVF.    Patient seen by provider today: No   present during visit today: Not Applicable.    Note: Pt was seen in the ED 9/18 for a temp of 100.8 at home. Pt states that since his Gemzar was given on 9/8/22 his mouth sores have gotten worse along with Bilateral LLE. He has had contact with CHRIS Rios(has an appt 9/26/22 with Malorie Castrejon) . Pt will  Doxepin today to help with the mouth sores.    Intravenous Access:  Implanted Port.    Treatment Conditions:  Not Applicable.    Post Infusion Assessment:  Patient tolerated infusion without incident.  Blood return noted pre and post infusion.  Site patent and intact, free from redness, edema or discomfort.  No evidence of extravasations.  Access discontinued per protocol.     Discharge Plan:   Discharge instructions reviewed with: Patient.  Patient discharged in stable condition accompanied by: self.  Departure Mode: Ambulatory.      Sabrina Castro RN

## 2022-09-22 NOTE — PROGRESS NOTES
1124: Received call from blood bank with critical value. Pt's plts = [phone],000. This does not require transfusion. Maria Simon RN

## 2022-09-27 NOTE — PROGRESS NOTES
Labs drawn via port without difficulty.  Patient saw Malorie Castrejon NP today. No PRBCs transfusion per Malorie.   Erin Lowe RN

## 2022-09-27 NOTE — LETTER
9/27/2022         RE: Ifrah Huitron  [address] [place] [address]        Dear Colleague,    Thank you for referring your patient, Ifrah Huitron, to the [facility], formerly [facility]. Please see a copy of my visit note below.    [facility] Outpatient Progress Note    Patient: Ifrah Huitron  MRN: [id number]  Date of Service: 04/26/2022        Reason for Visit    1. Recurrent/chronic mucositis, dehydration  2. Lower extremity edema  3. Spindle cell sarcoma, on chemo    Primary Oncologist: Dr. Wolff ([facility])/Maynor Rios PA    Assessment/Plan  1. Metastatic spindle cell sarcoma  He is on 3rd line chemo, s/p 2 cycles gemcitabine.   He still has thrombocytopenia (67K) as of today.    Plan:  -Due to reassess for cycle 3 later this week with his primary PA, Maynor Riso. However, with his thrombocytopenia I suspect this will need to be delayed x 1 week. I will update Maynor to make schedule changes as she sees fit.   -They are planning to repeat CT after cycle 3    2. Chronic/recurrent mucositis  This has been long-standing issue dating back to last October after starting Doxil/Ifos and has been refractory to several supportive interventions. His current chemo with gemcitabine holds about 11% risk to cause this.     He's been previously treated for thrush without response and exam does not look consistent with this.   There are some ulcerations that could be consistent with HSV. Attempted HSV culture earlier this year was not completed in lab due to inadequate sample.    He continues on salt/soda rinses, Helios.  Recently trialed dex oral rinse for 5+ days without a lot of benefit.   Started doxepin oral rinse late last week, this has been working extremely well for him and he is eating/drinking very well this week.     Does not like the numbing sensation with the Magic Mouthwash, so not using.     Plan:  -Doxepin working very well, so continue this  -If  symptoms reflare, may want to trial antiviral for possible HSV    3.   Bilateral LE edema/erythema  Acute onset shortly after day 8 gemcitabine infusion and pRBC transfusion. Had low gr fever x 1, but not recurrent.   During ED visit, felt to be side effect of gemcitabine and no antibiotics recommended. Since that time, edema is stable (L>R) and erythema is improving. No warmth, fever, chills. No leg pain, but they feel tight.     ECHO in May was normal  Hx renal insufficiency, but creatinine WNL.   Unlikely cellulitis since erythema improving and no fevers.   Other possible differentials are chemo side effect, malnutrition/hypoalbuminemia, DVT, or fluid overload with pRBC, IVF.     Plan:  -Bilateral Doppler US today to rule out DVT  -If negative, compression stockings and elevate legs  -Increase protein in diet  -Will defer IVF and pRBC transfusion this week to limit fluid overload    ADDENDUM:  Doppler negative    4.   Dehydration, stable  5.   Hypokalemia  6.   Hypophosphatemia  7.   Renal insufficiency, stable  Secondary to mucositis-limiting adequate oral intake, has been getting IVF at [facility] weekly.   He's doing much better with his oral intake this week. Creatinine stable.   On potassium 20 meq daily. Potassium is WNL.   On phos 250 mg BID. Phos is 2.4    Plan:  -Defer IVF this week as he is eating/drinking well on his own.   -Hold IVF weekly, if needed.  -Replace electrolytes, as needed    8.   Chemo-induced anemia and thrombocytopenia  Has required pRBC, last transfusion was last week. Hgb currently 7.8 - no symptoms and no cardiac hx.  Platelets 35K last week. On recheck today, 67K.    Plan:  -While pRBC parameters are for hgb <8.0, will defer this week since no symptoms. Recheck next week  -With persistent thrombocytopenia, suspect his chemo will need to be deferred to next week. Will update his primary PA to review    9.   Nausea, dyspepsia  Continues PPI daily, TUMS prn and Zofran PRN    10.    Hoarseness  Likely secondary to mediastinal mass. Status post vocal cord injection in July with ENT    11.   Cancer pain  Bengay and Tylenol    12.  Depression/anxiety  On Lexpro     13.  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 On Synthroid and hydrocortisone    ______________________________________________________________________________    History of Present Illness/ Interval History    Mr. Ifrah Huitron  is a 72 year old on palliative chemo for sarcoma, managed by Dr. Wolff and Maynor CHOW.   Regimen was changed to gemcitabine 6 weeks ago for progression. He completed cycle 2, day 8 almost two weeks ago and is due to start cycle 3 later this week.     I was asked to assess him in clinic for recurrent mucositis and acute lower extremity edema.     He's had mucositis since last Fall on chemo. It has fluctuated in intensity, but has been persistent.   Last week, it flared and was quite severe for him. He was started on dex oral rinse but it only got worse after 5 days stopped. He started on doxepin oral rinse late last week and in the last 2 days has had notable improvement in his oral pain and taste. He is eating and drinking very well. Continues salt water rinses.    He's required pRBC for chemo-induced anemia. Last was last week. Currently, denies significant fatigue, dizziness nor dyspnea.     Went to ED 9/18 for acute bilateral LE erythematous edema and subtle edema right hand following day 8 chemo infusion. Low grade fever (100) x 1. No pain nor warmth, dyspnea. Felt consistent with side effect of gemcitabine/pseudo cellulitis and since he appeared well, not neutropenic and no fever in ED, was not started on an antibiotic.   Over the week, edema persists but degree of erythema is improving. No leg pain. No dyspnea, chest pain nor fevers.     ECOG Performance    1-2      Oncology History/Treatment  Diagnosis/Stage:   3/2021: Metastatic spindle cell  sarcoma involving mediastinum, sub-diaphragm, lung  -presented to PCP with left shoulder/chest/back pain  -imaging: subdiaphragmatic mass, liver lesions, mediastinal mass with multiple lung mets  -mediastinal mass biopsy: spindle cell sarcoma. 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 Complicated by nausea and recurrent thrush  -C4: same dose reduction. Complicated by significant neurotoxicity, so only received 5.5 days. Symptoms resolved after stopping ifosfamide.   -CT showed response after 4 cycles    -3/10/2022 - 7/12/2022: single agent Doxil, until progression.   -C5 10% dose-reduced  -C6 additional 10% (20% total) dose-reduced  -8/8/2022: CT showed progression    8/10/2022 - present: gemcitabine (days 1, 8 every 21 d)      Physical Exam    GENERAL: Alert and oriented to time place and person. Seated comfortably. In no distress.  HEAD: Atraumatic and normocephalic. Partial alopecia.  EYES: LANCE, EOMI. No erythema. No icterus.  ORAL CAVITY: Moist. A few white ulcerations inner cheeks. Tongue normal, no candidiasis.   CHEST: clear to auscultation bilaterally. Resonant to percussion throughout bilaterally. Symmetrical breath movements bilaterally.  CVS:  Regular rate and rhythm.   ABDOMEN: Soft. Not tender. Not distended. No palpable hepatomegaly or splenomegaly. No other mass palpable. Bowel sounds present.  EXTREMITIES: Warm. 1-2+ pitting edema pre-tibial legs through feet L>R, with mild erythema.   SKIN: No rash  NEURO: No gross deficit noted. Non-antalgic gait.      Lab Results    Recent Results (from the past 168 hour(s))    Phosphorus   Result Value Ref Range    Phosphorus 3.1 2.5 - 4.5 mg/dL   Magnesium   Result Value Ref Range    Magnesium 2.1 1.7 - 2.3 mg/dL   Comprehensive metabolic panel   Result Value Ref Range    Sodium 136 136 - 145 mmol/L    Potassium 3.6 3.4 - 5.3 mmol/L    Chloride 102 98 - 107 mmol/L    Carbon Dioxide (CO2) 25 22 - 29 mmol/L    Anion Gap 9 7 - 15 mmol/L    Urea Nitrogen 27.2 (H) 8.0 - 23.0 mg/dL    Creatinine 0.99 0.67 - 1.17 mg/dL    Calcium 8.6 (L) 8.8 - 10.2 mg/dL    Glucose 143 (H) 70 - 99 mg/dL    Alkaline Phosphatase 171 (H) 40 - 129 U/L    AST 62 (H) 10 - 50 U/L    ALT 56 (H) 10 - 50 U/L    Protein Total 5.9 (L) 6.4 - 8.3 g/dL    Albumin 3.2 (L) 3.5 - 5.2 g/dL    Bilirubin Total 0.4 <=1.2 mg/dL    GFR Estimate 80 >60 mL/min/1.73m2   CBC with platelets and differential   Result Value Ref Range    WBC Count 4.0 4.0 - 11.0 10e3/uL    RBC Count 2.60 (L) 4.40 - 5.90 10e6/uL    Hemoglobin 7.9 (L) 13.3 - 17.7 g/dL    Hematocrit 24.3 (L) 40.0 - 53.0 %    MCV 94 78 - 100 fL    MCH 30.4 26.5 - 33.0 pg    MCHC 32.5 31.5 - 36.5 g/dL    RDW 16.4 (H) 10.0 - 15.0 %    Platelet Count 35 (LL) 150 - 450 10e3/uL    % Neutrophils 63 %    % Lymphocytes 34 %    % Monocytes 2 %    % Eosinophils 0 %    % Basophils 0 %    % Immature Granulocytes 1 %    NRBCs per 100 WBC 0 <1 /100    Absolute Neutrophils 2.6 1.6 - 8.3 10e3/uL    Absolute Lymphocytes 1.4 0.8 - 5.3 10e3/uL    Absolute Monocytes 0.1 0.0 - 1.3 10e3/uL    Absolute Eosinophils 0.0 0.0 - 0.7 10e3/uL    Absolute Basophils 0.0 0.0 - 0.2 10e3/uL    Absolute Immature Granulocytes 0.0 <=0.4 10e3/uL    Absolute NRBCs 0.0 10e3/uL   RBC and Platelet Morphology   Result Value Ref Range    Platelet Assessment  Automated Count Confirmed. Platelet morphology is normal.     Automated Count Confirmed. Platelet morphology is normal.    Teardrop Cells Slight (A) None Seen    Toxic Neutrophils Present (A) None Seen    RBC Morphology Confirmed RBC Indices    Phosphorus   Result  Value Ref Range    Phosphorus 2.4 (L) 2.5 - 4.5 mg/dL   Magnesium   Result Value Ref Range    Magnesium 1.9 1.7 - 2.3 mg/dL   Comprehensive metabolic panel   Result Value Ref Range    Sodium 140 136 - 145 mmol/L    Potassium 3.5 3.4 - 5.3 mmol/L    Chloride 104 98 - 107 mmol/L    Carbon Dioxide (CO2) 27 22 - 29 mmol/L    Anion Gap 9 7 - 15 mmol/L    Urea Nitrogen 21.8 8.0 - 23.0 mg/dL    Creatinine 1.07 0.67 - 1.17 mg/dL    Calcium 8.6 (L) 8.8 - 10.2 mg/dL    Glucose 137 (H) 70 - 99 mg/dL    Alkaline Phosphatase 174 (H) 40 - 129 U/L    AST 36 10 - 50 U/L    ALT 59 (H) 10 - 50 U/L    Protein Total 5.9 (L) 6.4 - 8.3 g/dL    Albumin 3.4 (L) 3.5 - 5.2 g/dL    Bilirubin Total 0.2 <=1.2 mg/dL    GFR Estimate 73 >60 mL/min/1.73m2   CBC with platelets and differential   Result Value Ref Range    WBC Count 4.0 4.0 - 11.0 10e3/uL    RBC Count 2.61 (L) 4.40 - 5.90 10e6/uL    Hemoglobin 7.8 (L) 13.3 - 17.7 g/dL    Hematocrit 24.8 (L) 40.0 - 53.0 %    MCV 95 78 - 100 fL    MCH 29.9 26.5 - 33.0 pg    MCHC 31.5 31.5 - 36.5 g/dL    RDW 16.7 (H) 10.0 - 15.0 %    Platelet Count 64 (L) 150 - 450 10e3/uL   Adult Type and Screen   Result Value Ref Range    ABO/RH(D) O POS     Antibody Screen Negative Negative    SPECIMEN EXPIRATION DATE [id number]    Manual Differential   Result Value Ref Range    % Neutrophils 49 %    % Lymphocytes 33 %    % Monocytes 14 %    % Eosinophils 1 %    % Basophils 0 %    % Metamyelocytes 2 %    % Myelocytes 1 %    NRBCs per 100 WBC 1 (H) <=0 %    Absolute Neutrophils 2.0 1.6 - 8.3 10e3/uL    Absolute Lymphocytes 1.3 0.8 - 5.3 10e3/uL    Absolute Monocytes 0.6 0.0 - 1.3 10e3/uL    Absolute Eosinophils 0.0 0.0 - 0.7 10e3/uL    Absolute Basophils 0.0 0.0 - 0.2 10e3/uL    Absolute Metamyelocytes 0.1 (H) <=0.0 10e3/uL    Absolute Myelocytes 0.0 <=0.0 10e3/uL    Absolute NRBCs 0.0 <=0.0 10e3/uL    RBC Morphology Confirmed RBC Indices     Platelet Assessment  Automated Count Confirmed. Platelet morphology is  "normal.     Automated Count Confirmed. Platelet morphology is normal.       Imaging    XR Chest 2 Views    Result Date: 9/18/2022  EXAM: XR CHEST 2 VIEWS LOCATION: [facility] DATE/TIME: 9/18/2022 4:08 PM INDICATION: Fever, recent chemotherapy, not neutropenic COMPARISON: CT chest 08/08/2022     IMPRESSION: Negative chest.    US Lower Extremity Venous Duplex Bilateral    Result Date: 9/27/2022  VENOUS ULTRASOUND BOTH LEGS  9/27/2022 11:17 AM HISTORY: Bilateral leg edema COMPARISON: None. FINDINGS:  Examination of the deep veins with graded compression and color flow Doppler with spectral wave form analysis was performed. There is no evidence for DVT in the lower extremities.     IMPRESSION: No evidence of deep venous thrombosis. TYESHA ROSS MD   SYSTEM ID:  [id number]      Billing  Total time: 40 min; including review of EMR, reports, diagnostics and ordering/coordination of care    Signed by: Malorie Castrejon NP      Oncology Rooming Note    September 27, 2022 9:23 AM   Ifrah Huitron is a 72 year old male who presents for:    Chief Complaint   Patient presents with     Oncology Clinic Visit     Spindle cell sarcoma - Labs and provider visit     Initial Vitals: /70 (BP Location: Right arm, Patient Position: Sitting, Cuff Size: Adult Regular)   Pulse 70   Temp 97.3  F (36.3  C) (Tympanic)   Resp 12   Wt 65.3 kg (144 lb)   SpO2 99%   BMI 22.55 kg/m   Estimated body mass index is 22.55 kg/m  as calculated from the following:    Height as of 9/18/22: 1.702 m (5' 7\").    Weight as of this encounter: 65.3 kg (144 lb). Body surface area is 1.76 meters squared.  Mild Pain (2) Comment: Data Unavailable   No LMP for male patient.  Allergies reviewed: Yes  Medications reviewed: Yes    Medications: Medication refills not needed today.  Pharmacy name entered into [employer]: [facility] - [facility] [id number] [facility]    Clinical concerns: None      Jacque García, " CMA                Again, thank you for allowing me to participate in the care of your patient.        Sincerely,        Malorie Castrejon, NP

## 2022-09-27 NOTE — PROGRESS NOTES
"Oncology Rooming Note    September 27, 2022 9:23 AM   Ifrah Huitron is a 72 year old male who presents for:    Chief Complaint   Patient presents with     Oncology Clinic Visit     Spindle cell sarcoma - Labs and provider visit     Initial Vitals: /70 (BP Location: Right arm, Patient Position: Sitting, Cuff Size: Adult Regular)   Pulse 70   Temp 97.3  F (36.3  C) (Tympanic)   Resp 12   Wt 65.3 kg (144 lb)   SpO2 99%   BMI 22.55 kg/m   Estimated body mass index is 22.55 kg/m  as calculated from the following:    Height as of 9/18/22: 1.702 m (5' 7\").    Weight as of this encounter: 65.3 kg (144 lb). Body surface area is 1.76 meters squared.  Mild Pain (2) Comment: Data Unavailable   No LMP for male patient.  Allergies reviewed: Yes  Medications reviewed: Yes    Medications: Medication refills not needed today.  Pharmacy name entered into [employer]: [facility] [id number] [facility]    Clinical concerns: None      Jacque García CMA            "

## 2022-09-27 NOTE — PATIENT INSTRUCTIONS
Doppler bilateral legs today - Malorie to review  -if negative - compression stocking, elevated legs.    Defer blood transfusion this week. Hgb 7.8, but pt not very symptomatic.     Counts too low for chemo this week, will need to defer a week.   I will update GERALDO Capone to make sure she agrees this should be rescheduled    Can cancel IVF this week - drinking well this week and labs stable.   -Hold appts for IVF +/- pRBC next week and continue weekly thereafter

## 2022-09-27 NOTE — PROGRESS NOTES
[facility] Hematology and Oncology Outpatient Progress Note    Patient: Ifrah Huitron  MRN: [id number]  Date of Service: 04/26/2022        Reason for Visit    1. Recurrent/chronic mucositis, dehydration  2. Lower extremity edema  3. Spindle cell sarcoma, on chemo    Primary Oncologist: Dr. Wolff ([facility])/Maynor Rios PA    Assessment/Plan  1. Metastatic spindle cell sarcoma  He is on 3rd line chemo, s/p 2 cycles gemcitabine.   He still has thrombocytopenia (67K) as of today.    Plan:  -Due to reassess for cycle 3 later this week with his primary PA, Maynor Rios. However, with his thrombocytopenia I suspect this will need to be delayed x 1 week. I will update Maynor to make schedule changes as she sees fit.   -They are planning to repeat CT after cycle 3    2. Chronic/recurrent mucositis  This has been long-standing issue dating back to last October after starting Doxil/Ifos and has been refractory to several supportive interventions. His current chemo with gemcitabine holds about 11% risk to cause this.     He's been previously treated for thrush without response and exam does not look consistent with this.   There are some ulcerations that could be consistent with HSV. Attempted HSV culture earlier this year was not completed in lab due to inadequate sample.    He continues on salt/soda rinses, Helios.  Recently trialed dex oral rinse for 5+ days without a lot of benefit.   Started doxepin oral rinse late last week, this has been working extremely well for him and he is eating/drinking very well this week.     Does not like the numbing sensation with the Magic Mouthwash, so not using.     Plan:  -Doxepin working very well, so continue this  -If symptoms reflare, may want to trial antiviral for possible HSV    3.   Bilateral LE edema/erythema  Acute onset shortly after day 8 gemcitabine infusion and pRBC transfusion. Had low gr fever x 1, but not recurrent.   During ED visit, felt to be  side effect of gemcitabine and no antibiotics recommended. Since that time, edema is stable (L>R) and erythema is improving. No warmth, fever, chills. No leg pain, but they feel tight.     ECHO in May was normal  Hx renal insufficiency, but creatinine WNL.   Unlikely cellulitis since erythema improving and no fevers.   Other possible differentials are chemo side effect, malnutrition/hypoalbuminemia, DVT, or fluid overload with pRBC, IVF.     Plan:  -Bilateral Doppler US today to rule out DVT  -If negative, compression stockings and elevate legs  -Increase protein in diet  -Will defer IVF and pRBC transfusion this week to limit fluid overload    ADDENDUM:  Doppler negative    4.   Dehydration, stable  5.   Hypokalemia  6.   Hypophosphatemia  7.   Renal insufficiency, stable  Secondary to mucositis-limiting adequate oral intake, has been getting IVF at [facility] weekly.   He's doing much better with his oral intake this week. Creatinine stable.   On potassium 20 meq daily. Potassium is WNL.   On phos 250 mg BID. Phos is 2.4    Plan:  -Defer IVF this week as he is eating/drinking well on his own.   -Hold IVF weekly, if needed.  -Replace electrolytes, as needed    8.   Chemo-induced anemia and thrombocytopenia  Has required pRBC, last transfusion was last week. Hgb currently 7.8 - no symptoms and no cardiac hx.  Platelets 35K last week. On recheck today, 67K.    Plan:  -While pRBC parameters are for hgb <8.0, will defer this week since no symptoms. Recheck next week  -With persistent thrombocytopenia, suspect his chemo will need to be deferred to next week. Will update his primary PA to review    9.   Nausea, dyspepsia  Continues PPI daily, TUMS prn and Zofran PRN    10.   Hoarseness  Likely secondary to mediastinal mass. Status post vocal cord injection in July with ENT    11.   Cancer pain  Bengay and Tylenol    12.  Depression/anxiety  On Lexpro     13.  Hypopituitarism with secondary adrenal insufficiency +  hypothyroidism  Managed in Endo. On Synthroid and hydrocortisone    ______________________________________________________________________________    History of Present Illness/ Interval History    Mr. Ifrah Huitron  is a 72 year old on palliative chemo for sarcoma, managed by Dr. Wolff and Maynor CHOW.   Regimen was changed to gemcitabine 6 weeks ago for progression. He completed cycle 2, day 8 almost two weeks ago and is due to start cycle 3 later this week.     I was asked to assess him in clinic for recurrent mucositis and acute lower extremity edema.     He's had mucositis since last Fall on chemo. It has fluctuated in intensity, but has been persistent.   Last week, it flared and was quite severe for him. He was started on dex oral rinse but it only got worse after 5 days stopped. He started on doxepin oral rinse late last week and in the last 2 days has had notable improvement in his oral pain and taste. He is eating and drinking very well. Continues salt water rinses.    He's required pRBC for chemo-induced anemia. Last was last week. Currently, denies significant fatigue, dizziness nor dyspnea.     Went to ED 9/18 for acute bilateral LE erythematous edema and subtle edema right hand following day 8 chemo infusion. Low grade fever (100) x 1. No pain nor warmth, dyspnea. Felt consistent with side effect of gemcitabine/pseudo cellulitis and since he appeared well, not neutropenic and no fever in ED, was not started on an antibiotic.   Over the week, edema persists but degree of erythema is improving. No leg pain. No dyspnea, chest pain nor fevers.     ECOG Performance    1-2      Oncology History/Treatment  Diagnosis/Stage:   3/2021: Metastatic spindle cell sarcoma involving mediastinum, sub-diaphragm, lung  -presented to PCP with left shoulder/chest/back pain  -imaging: subdiaphragmatic mass, liver lesions, mediastinal mass with multiple lung mets  -mediastinal mass biopsy: spindle cell sarcoma.  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 Complicated by nausea and recurrent thrush  -C4: same dose reduction. Complicated by significant neurotoxicity, so only received 5.5 days. Symptoms resolved after stopping ifosfamide.   -CT showed response after 4 cycles    -3/10/2022 - 7/12/2022: single agent Doxil, until progression.   -C5 10% dose-reduced  -C6 additional 10% (20% total) dose-reduced  -8/8/2022: CT showed progression    8/10/2022 - present: gemcitabine (days 1, 8 every 21 d)      Physical Exam    GENERAL: Alert and oriented to time place and person. Seated comfortably. In no distress.  HEAD: Atraumatic and normocephalic. Partial alopecia.  EYES: LANCE, EOMI. No erythema. No icterus.  ORAL CAVITY: Moist. A few white ulcerations inner cheeks. Tongue normal, no candidiasis.   CHEST: clear to auscultation bilaterally. Resonant to percussion throughout bilaterally. Symmetrical breath movements bilaterally.  CVS:  Regular rate and rhythm.   ABDOMEN: Soft. Not tender. Not distended. No palpable hepatomegaly or splenomegaly. No other mass palpable. Bowel sounds present.  EXTREMITIES: Warm. 1-2+ pitting edema pre-tibial legs through feet L>R, with mild erythema.   SKIN: No rash  NEURO: No gross deficit noted. Non-antalgic gait.      Lab Results    Recent Results (from the past 168 hour(s))   Phosphorus   Result Value Ref Range    Phosphorus 3.1 2.5 - 4.5 mg/dL   Magnesium   Result Value Ref Range    Magnesium 2.1 1.7 - 2.3 mg/dL   Comprehensive metabolic panel   Result Value Ref Range    Sodium 136 136 - 145 mmol/L    Potassium 3.6 3.4 -  5.3 mmol/L    Chloride 102 98 - 107 mmol/L    Carbon Dioxide (CO2) 25 22 - 29 mmol/L    Anion Gap 9 7 - 15 mmol/L    Urea Nitrogen 27.2 (H) 8.0 - 23.0 mg/dL    Creatinine 0.99 0.67 - 1.17 mg/dL    Calcium 8.6 (L) 8.8 - 10.2 mg/dL    Glucose 143 (H) 70 - 99 mg/dL    Alkaline Phosphatase 171 (H) 40 - 129 U/L    AST 62 (H) 10 - 50 U/L    ALT 56 (H) 10 - 50 U/L    Protein Total 5.9 (L) 6.4 - 8.3 g/dL    Albumin 3.2 (L) 3.5 - 5.2 g/dL    Bilirubin Total 0.4 <=1.2 mg/dL    GFR Estimate 80 >60 mL/min/1.73m2   CBC with platelets and differential   Result Value Ref Range    WBC Count 4.0 4.0 - 11.0 10e3/uL    RBC Count 2.60 (L) 4.40 - 5.90 10e6/uL    Hemoglobin 7.9 (L) 13.3 - 17.7 g/dL    Hematocrit 24.3 (L) 40.0 - 53.0 %    MCV 94 78 - 100 fL    MCH 30.4 26.5 - 33.0 pg    MCHC 32.5 31.5 - 36.5 g/dL    RDW 16.4 (H) 10.0 - 15.0 %    Platelet Count 35 (LL) 150 - 450 10e3/uL    % Neutrophils 63 %    % Lymphocytes 34 %    % Monocytes 2 %    % Eosinophils 0 %    % Basophils 0 %    % Immature Granulocytes 1 %    NRBCs per 100 WBC 0 <1 /100    Absolute Neutrophils 2.6 1.6 - 8.3 10e3/uL    Absolute Lymphocytes 1.4 0.8 - 5.3 10e3/uL    Absolute Monocytes 0.1 0.0 - 1.3 10e3/uL    Absolute Eosinophils 0.0 0.0 - 0.7 10e3/uL    Absolute Basophils 0.0 0.0 - 0.2 10e3/uL    Absolute Immature Granulocytes 0.0 <=0.4 10e3/uL    Absolute NRBCs 0.0 10e3/uL   RBC and Platelet Morphology   Result Value Ref Range    Platelet Assessment  Automated Count Confirmed. Platelet morphology is normal.     Automated Count Confirmed. Platelet morphology is normal.    Teardrop Cells Slight (A) None Seen    Toxic Neutrophils Present (A) None Seen    RBC Morphology Confirmed RBC Indices    Phosphorus   Result Value Ref Range    Phosphorus 2.4 (L) 2.5 - 4.5 mg/dL   Magnesium   Result Value Ref Range    Magnesium 1.9 1.7 - 2.3 mg/dL   Comprehensive metabolic panel   Result Value Ref Range    Sodium 140 136 - 145 mmol/L    Potassium 3.5 3.4 - 5.3 mmol/L     Chloride 104 98 - 107 mmol/L    Carbon Dioxide (CO2) 27 22 - 29 mmol/L    Anion Gap 9 7 - 15 mmol/L    Urea Nitrogen 21.8 8.0 - 23.0 mg/dL    Creatinine 1.07 0.67 - 1.17 mg/dL    Calcium 8.6 (L) 8.8 - 10.2 mg/dL    Glucose 137 (H) 70 - 99 mg/dL    Alkaline Phosphatase 174 (H) 40 - 129 U/L    AST 36 10 - 50 U/L    ALT 59 (H) 10 - 50 U/L    Protein Total 5.9 (L) 6.4 - 8.3 g/dL    Albumin 3.4 (L) 3.5 - 5.2 g/dL    Bilirubin Total 0.2 <=1.2 mg/dL    GFR Estimate 73 >60 mL/min/1.73m2   CBC with platelets and differential   Result Value Ref Range    WBC Count 4.0 4.0 - 11.0 10e3/uL    RBC Count 2.61 (L) 4.40 - 5.90 10e6/uL    Hemoglobin 7.8 (L) 13.3 - 17.7 g/dL    Hematocrit 24.8 (L) 40.0 - 53.0 %    MCV 95 78 - 100 fL    MCH 29.9 26.5 - 33.0 pg    MCHC 31.5 31.5 - 36.5 g/dL    RDW 16.7 (H) 10.0 - 15.0 %    Platelet Count 64 (L) 150 - 450 10e3/uL   Adult Type and Screen   Result Value Ref Range    ABO/RH(D) O POS     Antibody Screen Negative Negative    SPECIMEN EXPIRATION DATE [id number]    Manual Differential   Result Value Ref Range    % Neutrophils 49 %    % Lymphocytes 33 %    % Monocytes 14 %    % Eosinophils 1 %    % Basophils 0 %    % Metamyelocytes 2 %    % Myelocytes 1 %    NRBCs per 100 WBC 1 (H) <=0 %    Absolute Neutrophils 2.0 1.6 - 8.3 10e3/uL    Absolute Lymphocytes 1.3 0.8 - 5.3 10e3/uL    Absolute Monocytes 0.6 0.0 - 1.3 10e3/uL    Absolute Eosinophils 0.0 0.0 - 0.7 10e3/uL    Absolute Basophils 0.0 0.0 - 0.2 10e3/uL    Absolute Metamyelocytes 0.1 (H) <=0.0 10e3/uL    Absolute Myelocytes 0.0 <=0.0 10e3/uL    Absolute NRBCs 0.0 <=0.0 10e3/uL    RBC Morphology Confirmed RBC Indices     Platelet Assessment  Automated Count Confirmed. Platelet morphology is normal.     Automated Count Confirmed. Platelet morphology is normal.       Imaging    XR Chest 2 Views    Result Date: 9/18/2022  EXAM: XR CHEST 2 VIEWS LOCATION: [facility] DATE/TIME: 9/18/2022 4:08 PM INDICATION: Fever,  recent chemotherapy, not neutropenic COMPARISON: CT chest 08/08/2022     IMPRESSION: Negative chest.    US Lower Extremity Venous Duplex Bilateral    Result Date: 9/27/2022  VENOUS ULTRASOUND BOTH LEGS  9/27/2022 11:17 AM HISTORY: Bilateral leg edema COMPARISON: None. FINDINGS:  Examination of the deep veins with graded compression and color flow Doppler with spectral wave form analysis was performed. There is no evidence for DVT in the lower extremities.     IMPRESSION: No evidence of deep venous thrombosis. TYESHA ROSS MD   SYSTEM ID:  [id number]      Billing  Total time: 40 min; including review of EMR, reports, diagnostics and ordering/coordination of care    Signed by: Malorie Castrejon NP

## 2022-09-27 NOTE — PROGRESS NOTES
Ifrah is a 72 year old who is being evaluated via a billable video visit.  Currently in [place].    How would you like to obtain your AVS? MyChart  If the video visit is dropped, the invitation should be resent by: Text to cell phone: [phone]  Will anyone else be joining your video visit? Yes, patients wife.     Laurie Rebolledohaway, JENNIFER      Video-Visit Details    Video Start Time: 2:35pm    Type of service:  Video Visit    Video End Time:2:50pm    Originating Location (pt. Location): Home    Distant Location (provider location):  [facility]     Platform used for Video Visit: [facility]      Oncology/Hematology Visit Note  Sep 28, 2022    Reason for Visit: Follow up of spindle cell sarcoma     History of Present Illness: Ifrah Huitron is a 72 year old male with PMH rosacea, pituitary macroadenoma s/p resection, GERD, kidney stones, DJD with metastatic spindle cell sarcoma. He presented to his PCP March 2021 with chest pain/left shoulder and back pain that led to imaging which revealed multiple lung mets. There were difficulties in getting diagnostic biopsy, eventually biopsy of mediastinal mass with spindle cell sarcoma. There was high PD-L1 expression (90%). Baseline imaging 6/21/21 with progression of lung mets and left-sided subdiaphragmatic mass, stable liver lesions. 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      He started Keytruda 6/21/21. CT 8/2/21 with positive response to treatment. CT 10/18/21 with mixed response- LUQ mass larger, anterior mediastinal and left anterior pleural mass smaller. Keytruda stopped.     Started on Doxil + Ifosfamide 10/26/21. Poorly tolerated with mucositis, nausea, poor oral intake. CT with stable to positive response.      Received C2 12/1/21 (delayed for tolerance issues) with 10% dose reduction.     Received C3 1/4/22 with same 10% dose reduction. He had issues with  "nausea inpatient along with recurrent thrush.      Received C4 2/2/22 with same 10% dose reduction. Had more significant neurotoxicity so only received 5.5 days. Symptoms resolved after stopping Ifosfamide.      CT 3/8/22 with positive response to treatment.      He was on Doxil alone but has been dealing with worsening mouth and skin toxicity and has required delays and dose reductions. CT 8/8/22 with disease progression.     Switched to Gemzar 8/10/22.    Interval History:  Ifrah is doing very well today after having a very rough time after his last chemo. His mouth sores are much improved on Doxepin, eating and drinking well. Still having edema and going to wear compression stockings. No recurrent fevers. Had thorough visit with Malorie Castreojn NP in [place] yesterday.     Current Outpatient Medications   Medication Sig Dispense Refill     clotrimazole (MYCELEX) 10 MG lozenge Place 1 lozenge (10 mg) inside cheek 5 times daily (Patient not taking: Reported on 9/27/2022) 90 lozenge 1     doxepin (SINEQUAN) 10 MG/ML (HIGH CONC) solution Take 2.5 mLs (25 mg) by mouth 2 times daily as needed (rinse for mucositis) Dilute the 2.5 mL of doxepin to 5 ml total in sterile or distilled water. 118 mL 0     escitalopram (LEXAPRO) 10 MG tablet Take 1 tablet (10 mg) by mouth daily 90 tablet 3     guaiFENesin-codeine (GUAIFENESIN AC) 100-10 MG/5ML syrup Take 10 mLs by mouth every 4 hours as needed for cough 118 mL 0     hydrocortisone (CORTEF) 10 MG tablet Take 20 mg in the morning and 10 mg in the afternoon 270 tablet 3     Insulin Syringe-Needle U-100 27.5G X 5/8\" 2 ML MISC 1 each daily as needed (with solucortef for adrenal crisis) 10 each 1     levothyroxine (SYNTHROID/LEVOTHROID) 75 MCG tablet Take 1 tablet (75 mcg) by mouth every morning 90 tablet 3     Menthol-Methyl Salicylate (DALIA MALIK GREASELESS) cream Apply topically 2 times daily       metroNIDAZOLE (METROGEL) 1 % external gel Apply topically daily .Try stronger dose due to " increased symptoms after chemo. (Patient not taking: Reported on 9/27/2022) 30 g 0     omeprazole (PRILOSEC) 20 MG DR capsule Take 1 capsule (20 mg) by mouth 2 times daily 60 capsule 1     ondansetron (ZOFRAN) 4 MG tablet Take 1-2 tablets (4-8 mg) by mouth every 8 hours as needed for nausea 60 tablet 3     phosphorus tablet 250 mg (PHOSPHA 250 NEUTRAL) 250 MG per tablet Take 1 tablet (250 mg) by mouth 2 times daily 60 tablet 3     potassium chloride ER (KLOR-CON M) 20 MEQ CR tablet Take 1 tablet (20 mEq) by mouth daily 90 tablet 3     prochlorperazine (COMPAZINE) 5 MG tablet Take 1 tablet (5 mg) by mouth every 6 hours as needed for nausea or vomiting . Caution: causes sedation. (Patient not taking: Reported on 9/27/2022) 30 tablet 1     SUMAtriptan (IMITREX) 50 MG tablet Take 1 tablet (50 mg) by mouth at onset of headache for migraine May repeat in 2 hours. Max 4 tablets/24 hours. (Patient taking differently: Take 50 mg by mouth at onset of headache for migraine May repeat in 2 hours. Max 4 tablets/24 hours. PRN) 10 tablet 3     triamcinolone (KENALOG) 0.1 % external cream Apply topically 2 times daily to bothersome skin areas. 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     Thyroid disease     removed pituitary gland     Past Surgical History:   Procedure Laterality Date     COLONOSCOPY N/A 12/17/2020    Procedure: COLONOSCOPY;  Surgeon: Ken Camacho MD;  Location: [facility]     ENT SURGERY       HERNIA REPAIR       INSERT PORT VASCULAR ACCESS Right 1/28/2022    Procedure: INSERTION, VASCULAR ACCESS PORT;  Surgeon: Daniel Kinney MD;  [facility] [facility]     IR CHEST PORT PLACEMENT > 5 YRS OF AGE  1/28/2022     LARYNGOSCOPY, EXCISE VOCAL CORD LESION MICROSCOPIC, COMBINED Left 07/01/2021    Procedure: MICROLARYNGOSCOPY, LEFT TRUE VOCAL CORD INJECTION WITH PROLARYN;  Surgeon: Kyree Bearden,  MD;  Location: [facility]     PHACOEMULSIFICATION WITH STANDARD INTRAOCULAR LENS IMPLANT Right 03/10/2021    Procedure: Cataract removal with implant.;  Surgeon: Jamir Mac MD;  Location: [facility]     PHACOEMULSIFICATION WITH STANDARD INTRAOCULAR LENS IMPLANT Left 04/05/2021    Procedure: Cataract removal with implant.;  Surgeon: Jamir Mac MD;  Location: [facility]     PICC DOUBLE LUMEN PLACEMENT Right 12/01/2021    5FR DL PICC, basilic vein. L-38cm, 1cm out.     PICC DOUBLE LUMEN PLACEMENT Right 01/04/2022    Right cephalic, 41 cm, 1 external length     PITUITARY EXCISION       tooth pulled 4/7  Right      Allergies   Allergen Reactions     Penicillins Hives and Swelling            Social History   Social History     Tobacco Use     Smoking status: Never Smoker     Smokeless tobacco: Never Used   Substance Use Topics     Alcohol use: Yes     Comment: rare     Drug use: Never      Past medical history and social history were reviewed.    Physical Examination:  There were no vitals taken for this visit.  Wt Readings from Last 10 Encounters:   09/27/22 65.3 kg (144 lb)   09/18/22 61.2 kg (135 lb)   09/15/22 63.1 kg (139 lb 3.2 oz)   09/08/22 63.2 kg (139 lb 6.4 oz)   08/25/22 62.3 kg (137 lb 6.4 oz)   08/10/22 63.4 kg (139 lb 11.2 oz)   07/12/22 60.4 kg (133 lb 3.2 oz)   06/27/22 60.3 kg (133 lb)   06/09/22 62.1 kg (137 lb)   04/26/22 64 kg (141 lb 3.2 oz)     Video physical exam  General: Patient appears well in no acute distress.   Skin: No visualized rash or lesions on visualized skin  Eyes: EOMI, no erythema, sclera icterus or discharge noted  Resp: Appears to be breathing comfortably without accessory muscle usage, speaking in full sentences, no cough  MSK: Appears to have normal range of motion based on visualized movements  Neurologic: No apparent tremors, facial movements symmetric  Psych: affect normal, alert and oriented    Laboratory Data:     Latest Reference Range & Units 09/27/22 09:13    Sodium 136 - 145 mmol/L 140   Potassium 3.4 - 5.3 mmol/L 3.5   Chloride 98 - 107 mmol/L 104   Carbon Dioxide (CO2) 22 - 29 mmol/L 27   Urea Nitrogen 8.0 - 23.0 mg/dL 21.8   Creatinine 0.67 - 1.17 mg/dL 1.07   GFR Estimate >60 mL/min/1.73m2 73   Calcium 8.8 - 10.2 mg/dL 8.6 (L)   Anion Gap 7 - 15 mmol/L 9   Magnesium 1.7 - 2.3 mg/dL 1.9   Phosphorus 2.5 - 4.5 mg/dL 2.4 (L)   Albumin 3.5 - 5.2 g/dL 3.4 (L)   Protein Total 6.4 - 8.3 g/dL 5.9 (L)   Alkaline Phosphatase 40 - 129 U/L 174 (H)   ALT 10 - 50 U/L 59 (H)   AST 10 - 50 U/L 36   Bilirubin Total <=1.2 mg/dL 0.2   Glucose 70 - 99 mg/dL 137 (H)   WBC 4.0 - 11.0 10e3/uL 4.0   Hemoglobin 13.3 - 17.7 g/dL 7.8 (L)   Hematocrit 40.0 - 53.0 % 24.8 (L)   Platelet Count 150 - 450 10e3/uL 64 (L)   RBC Count 4.40 - 5.90 10e6/uL 2.61 (L)   MCV 78 - 100 fL 95   MCH 26.5 - 33.0 pg 29.9   MCHC 31.5 - 36.5 g/dL 31.5   RDW 10.0 - 15.0 % 16.7 (H)   % Neutrophils % 49   % Lymphocytes % 33   % Monocytes % 14   % Eosinophils % 1   % Basophils % 0   % Metamyelocytes % 2   % Myelocytes % 1   Absolute Basophils 0.0 - 0.2 10e3/uL 0.0   NRBC/W <=0 % 1 (H)   Absolute Neutrophil 1.6 - 8.3 10e3/uL 2.0   Absolute Lymphocytes 0.8 - 5.3 10e3/uL 1.3   Absolute Monocytes 0.0 - 1.3 10e3/uL 0.6   Absolute Eosinophils 0.0 - 0.7 10e3/uL 0.0   (L): Data is abnormally low  (H): Data is abnormally high    Assessment and Plan:  1. 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 High PD-L1. Was on Keytruda but had progression, started Doxil + Ifos started 10/26/21. Completed 4 cycles with positive response but had significant toxicities (nausea/vomiting, dehydration, neurotoxicity, mucositis, skin toxicity, pancytopenia, hypokalemia, hypophosphatemia). Was on Doxil alone March 2022-July 2022. Due to disease progression switched to Gemzar 8/10/22.    Had difficulties with day 1 day 8 treatment this cycle with fever, swelling, mucositis, pancytopenia requiring pRBC.  Plt now 64K. DEFER cycle 3 day 1 by one week to 10/6/22.    Given toxicities and cytopenias will also change treatment to be day 1 and day 15 moving forward (will be due for cycle 3 day 15 on 10/20/22). No dose reduction yet, will see if he tolerates better on this schedule.    Keep plan for repeat CT mid October.     2. Mucositis  Recurrent, ongoing with all treatment we have done. Refractory to multiple interventions. Now finally improved with Doxepin. Continue.     3. Edema  2/2 gemcitabine. ED work-up negative for infection. US negative for DVT. Continue compression stockings and no IVF this week.      4. GI  Dyspepsia: Continue Omeprazole 40mg daily. Continue Tums PRN     CINV: Zofran PRN.      5. 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 Tao. Stable.     Hypothyroidism continue Synthroid 75mcg daily, endo following      6. Derm  Rosacea: Long standing issue, continue Metrogel PRN     Hand/foot: 2/2 Doxil, improved. No issues now that he is off Doxil.     7. MSK  Left shoulder/back/chest wall pain 2/2 tumor, following with palliative. Minimal pain currently, off all pain medications.  Tylenol PRN.     8. ENT  Hoarseness: Thought 2/2 mediastinal mass vs irritation from prior biopsy. Following with ENT, s/p vocal cord injection 7/1/21. Following with voice clinic.      9. Pulm/Cards  Working with speech on laryngreal dysfunction.     SOB improved. CT stable. Echo stable.      Continue Guaifenesin-Coedine PRN for cough, much improved. Refilled.       Off statin due to LFT elevation, improved.      10. FEN  Hypokalemia: Recurrent issue, continue 20meq daily.      Hypophosphatemia: Continue 250mg phos BID for recurrent issues.      Renal insufficiency: Doing better, creat WNL now.      Previously referred to medical cannabis program for poor appetite related to malignancy/treatment.      11. Psych/Neuro  Anxiety/depression: Improved, on Lexapro and feels like it is helping.  Continue.      Continue PRN Imitrex for migraines.      10. Heme  Pancytopenia 2/2 chemotherapy. Monitor. Transfuse if hgb <8. Defer chemo if plt <75 or ANC <1. Deferring treatment as above.      25 minutes spent on the date of the encounter doing chart review, review of test results, interpretation of tests, patient visit and documentation     Maynor Rios PA-C  Department of Hematology and Oncology  [facility]

## 2022-09-28 NOTE — NURSING NOTE
Patient declined individual allergy and medication review by support staff because meds were reviewed yesterday and pt states no changes.    Laurie Urbina, VF

## 2022-09-28 NOTE — LETTER
9/28/2022         RE: Ifrah Huitron  [id number] [place] [address]        Dear Colleague,    Thank you for referring your patient, Ifrah Huitron, to the [facility]. Please see a copy of my visit note below.    Ifrah is a 72 year old who is being evaluated via a billable video visit.  Currently in [place].    How would you like to obtain your AVS? MyChart  If the video visit is dropped, the invitation should be resent by: Text to cell phone: [phone]  Will anyone else be joining your video visit? Yes, patients wife.     Laurie Carlitos, JENNIFER      Video-Visit Details    Video Start Time: 2:35pm    Type of service:  Video Visit    Video End Time:2:50pm    Originating Location (pt. Location): Home    Distant Location (provider location):  [facility]     Platform used for Video Visit: Philly      Oncology/Hematology Visit Note  Sep 28, 2022    Reason for Visit: Follow up of spindle cell sarcoma     History of Present Illness: Ifrah Huitron is a 72 year old male with PMH rosacea, pituitary macroadenoma s/p resection, GERD, kidney stones, DJD with metastatic spindle cell sarcoma. He presented to his PCP March 2021 with chest pain/left shoulder and back pain that led to imaging which revealed multiple lung mets. There were difficulties in getting diagnostic biopsy, eventually biopsy of mediastinal mass with spindle cell sarcoma. There was high PD-L1 expression (90%). Baseline imaging 6/21/21 with progression of lung mets and left-sided subdiaphragmatic mass, stable liver lesions. 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      He started Keytruda 6/21/21. CT 8/2/21 with positive response to treatment. CT 10/18/21 with mixed response- LUQ mass larger, anterior mediastinal and left anterior pleural mass smaller. Keytruda stopped.     Started on Doxil + Ifosfamide  "10/26/21. Poorly tolerated with mucositis, nausea, poor oral intake. CT with stable to positive response.      Received C2 12/1/21 (delayed for tolerance issues) with 10% dose reduction.     Received C3 1/4/22 with same 10% dose reduction. He had issues with nausea inpatient along with recurrent thrush.      Received C4 2/2/22 with same 10% dose reduction. Had more significant neurotoxicity so only received 5.5 days. Symptoms resolved after stopping Ifosfamide.      CT 3/8/22 with positive response to treatment.      He was on Doxil alone but has been dealing with worsening mouth and skin toxicity and has required delays and dose reductions. CT 8/8/22 with disease progression.     Switched to Gemzar 8/10/22.    Interval History:  Ifrah is doing very well today after having a very rough time after his last chemo. His mouth sores are much improved on Doxepin, eating and drinking well. Still having edema and going to wear compression stockings. No recurrent fevers. Had thorough visit with Malorie Castrejon NP in [place] yesterday.     Current Outpatient Medications   Medication Sig Dispense Refill     clotrimazole (MYCELEX) 10 MG lozenge Place 1 lozenge (10 mg) inside cheek 5 times daily (Patient not taking: Reported on 9/27/2022) 90 lozenge 1     doxepin (SINEQUAN) 10 MG/ML (HIGH CONC) solution Take 2.5 mLs (25 mg) by mouth 2 times daily as needed (rinse for mucositis) Dilute the 2.5 mL of doxepin to 5 ml total in sterile or distilled water. 118 mL 0     escitalopram (LEXAPRO) 10 MG tablet Take 1 tablet (10 mg) by mouth daily 90 tablet 3     guaiFENesin-codeine (GUAIFENESIN AC) 100-10 MG/5ML syrup Take 10 mLs by mouth every 4 hours as needed for cough 118 mL 0     hydrocortisone (CORTEF) 10 MG tablet Take 20 mg in the morning and 10 mg in the afternoon 270 tablet 3     Insulin Syringe-Needle U-100 27.5G X 5/8\" 2 ML MISC 1 each daily as needed (with solucortef for adrenal crisis) 10 each 1     levothyroxine (SYNTHROID/LEVOTHROID) " 75 MCG tablet Take 1 tablet (75 mcg) by mouth every morning 90 tablet 3     Menthol-Methyl Salicylate (DALIA MALIK GREASELESS) cream Apply topically 2 times daily       metroNIDAZOLE (METROGEL) 1 % external gel Apply topically daily .Try stronger dose due to increased symptoms after chemo. (Patient not taking: Reported on 9/27/2022) 30 g 0     omeprazole (PRILOSEC) 20 MG DR capsule Take 1 capsule (20 mg) by mouth 2 times daily 60 capsule 1     ondansetron (ZOFRAN) 4 MG tablet Take 1-2 tablets (4-8 mg) by mouth every 8 hours as needed for nausea 60 tablet 3     phosphorus tablet 250 mg (PHOSPHA 250 NEUTRAL) 250 MG per tablet Take 1 tablet (250 mg) by mouth 2 times daily 60 tablet 3     potassium chloride ER (KLOR-CON M) 20 MEQ CR tablet Take 1 tablet (20 mEq) by mouth daily 90 tablet 3     prochlorperazine (COMPAZINE) 5 MG tablet Take 1 tablet (5 mg) by mouth every 6 hours as needed for nausea or vomiting . Caution: causes sedation. (Patient not taking: Reported on 9/27/2022) 30 tablet 1     SUMAtriptan (IMITREX) 50 MG tablet Take 1 tablet (50 mg) by mouth at onset of headache for migraine May repeat in 2 hours. Max 4 tablets/24 hours. (Patient taking differently: Take 50 mg by mouth at onset of headache for migraine May repeat in 2 hours. Max 4 tablets/24 hours. PRN) 10 tablet 3     triamcinolone (KENALOG) 0.1 % external cream Apply topically 2 times daily to bothersome skin areas. 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     Thyroid disease     removed pituitary gland     Past Surgical History:   Procedure Laterality Date     COLONOSCOPY N/A 12/17/2020    Procedure: COLONOSCOPY;  Surgeon: Ken Camacho MD;  Location: [facility]     ENT SURGERY       HERNIA REPAIR       INSERT PORT VASCULAR ACCESS Right 1/28/2022    Procedure: INSERTION, VASCULAR ACCESS PORT;  Surgeon: Daniel Kinney MD;   Location: [facility] OR     IR CHEST PORT PLACEMENT > 5 YRS OF AGE  1/28/2022     LARYNGOSCOPY, EXCISE VOCAL CORD LESION MICROSCOPIC, COMBINED Left 07/01/2021    Procedure: MICROLARYNGOSCOPY, LEFT TRUE VOCAL CORD INJECTION WITH PROLARYN;  Surgeon: Kyree Bearden MD;  Location: [facility]     PHACOEMULSIFICATION WITH STANDARD INTRAOCULAR LENS IMPLANT Right 03/10/2021    Procedure: Cataract removal with implant.;  Surgeon: Jamir Mac MD;  Location: [facility]     PHACOEMULSIFICATION WITH STANDARD INTRAOCULAR LENS IMPLANT Left 04/05/2021    Procedure: Cataract removal with implant.;  Surgeon: Jamir Mac MD;  Location: [facility]     PICC DOUBLE LUMEN PLACEMENT Right 12/01/2021    5FR DL PICC, basilic vein. L-38cm, 1cm out.     PICC DOUBLE LUMEN PLACEMENT Right 01/04/2022    Right cephalic, 41 cm, 1 external length     PITUITARY EXCISION       tooth pulled 4/7  Right      Allergies   Allergen Reactions     Penicillins Hives and Swelling            Social History   Social History     Tobacco Use     Smoking status: Never Smoker     Smokeless tobacco: Never Used   Substance Use Topics     Alcohol use: Yes     Comment: rare     Drug use: Never      Past medical history and social history were reviewed.    Physical Examination:  There were no vitals taken for this visit.  Wt Readings from Last 10 Encounters:   09/27/22 65.3 kg (144 lb)   09/18/22 61.2 kg (135 lb)   09/15/22 63.1 kg (139 lb 3.2 oz)   09/08/22 63.2 kg (139 lb 6.4 oz)   08/25/22 62.3 kg (137 lb 6.4 oz)   08/10/22 63.4 kg (139 lb 11.2 oz)   07/12/22 60.4 kg (133 lb 3.2 oz)   06/27/22 60.3 kg (133 lb)   06/09/22 62.1 kg (137 lb)   04/26/22 64 kg (141 lb 3.2 oz)     Video physical exam  General: Patient appears well in no acute distress.   Skin: No visualized rash or lesions on visualized skin  Eyes: EOMI, no erythema, sclera icterus or discharge noted  Resp: Appears to be breathing comfortably without accessory muscle usage, speaking in full sentences, no  cough  MSK: Appears to have normal range of motion based on visualized movements  Neurologic: No apparent tremors, facial movements symmetric  Psych: affect normal, alert and oriented    Laboratory Data:     Latest Reference Range & Units 09/27/22 09:13   Sodium 136 - 145 mmol/L 140   Potassium 3.4 - 5.3 mmol/L 3.5   Chloride 98 - 107 mmol/L 104   Carbon Dioxide (CO2) 22 - 29 mmol/L 27   Urea Nitrogen 8.0 - 23.0 mg/dL 21.8   Creatinine 0.67 - 1.17 mg/dL 1.07   GFR Estimate >60 mL/min/1.73m2 73   Calcium 8.8 - 10.2 mg/dL 8.6 (L)   Anion Gap 7 - 15 mmol/L 9   Magnesium 1.7 - 2.3 mg/dL 1.9   Phosphorus 2.5 - 4.5 mg/dL 2.4 (L)   Albumin 3.5 - 5.2 g/dL 3.4 (L)   Protein Total 6.4 - 8.3 g/dL 5.9 (L)   Alkaline Phosphatase 40 - 129 U/L 174 (H)   ALT 10 - 50 U/L 59 (H)   AST 10 - 50 U/L 36   Bilirubin Total <=1.2 mg/dL 0.2   Glucose 70 - 99 mg/dL 137 (H)   WBC 4.0 - 11.0 10e3/uL 4.0   Hemoglobin 13.3 - 17.7 g/dL 7.8 (L)   Hematocrit 40.0 - 53.0 % 24.8 (L)   Platelet Count 150 - 450 10e3/uL 64 (L)   RBC Count 4.40 - 5.90 10e6/uL 2.61 (L)   MCV 78 - 100 fL 95   MCH 26.5 - 33.0 pg 29.9   MCHC 31.5 - 36.5 g/dL 31.5   RDW 10.0 - 15.0 % 16.7 (H)   % Neutrophils % 49   % Lymphocytes % 33   % Monocytes % 14   % Eosinophils % 1   % Basophils % 0   % Metamyelocytes % 2   % Myelocytes % 1   Absolute Basophils 0.0 - 0.2 10e3/uL 0.0   NRBC/W <=0 % 1 (H)   Absolute Neutrophil 1.6 - 8.3 10e3/uL 2.0   Absolute Lymphocytes 0.8 - 5.3 10e3/uL 1.3   Absolute Monocytes 0.0 - 1.3 10e3/uL 0.6   Absolute Eosinophils 0.0 - 0.7 10e3/uL 0.0   (L): Data is abnormally low  (H): Data is abnormally high    Assessment and Plan:  1. 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 High PD-L1. Was on Keytruda but had progression, started Doxil + Ifos started 10/26/21. Completed 4 cycles with positive response but had significant toxicities (nausea/vomiting, dehydration, neurotoxicity, mucositis, skin toxicity,  pancytopenia, hypokalemia, hypophosphatemia). Was on Doxil alone March 2022-July 2022. Due to disease progression switched to Gemzar 8/10/22.    Had difficulties with day 1 day 8 treatment this cycle with fever, swelling, mucositis, pancytopenia requiring pRBC. Plt now 64K. DEFER cycle 3 day 1 by one week to 10/6/22.    Given toxicities and cytopenias will also change treatment to be day 1 and day 15 moving forward (will be due for cycle 3 day 15 on 10/20/22). No dose reduction yet, will see if he tolerates better on this schedule.    Keep plan for repeat CT mid October.     2. Mucositis  Recurrent, ongoing with all treatment we have done. Refractory to multiple interventions. Now finally improved with Doxepin. Continue.     3. Edema  2/2 gemcitabine. ED work-up negative for infection. US negative for DVT. Continue compression stockings and no IVF this week.      4. GI  Dyspepsia: Continue Omeprazole 40mg daily. Continue Tums PRN     CINV: Zofran PRN.      5. 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 Tao. Stable.     Hypothyroidism continue Synthroid 75mcg daily, endo following      6. Derm  Rosacea: Long standing issue, continue Metrogel PRN     Hand/foot: 2/2 Doxil, improved. No issues now that he is off Doxil.     7. MSK  Left shoulder/back/chest wall pain 2/2 tumor, following with palliative. Minimal pain currently, off all pain medications.  Tylenol PRN.     8. ENT  Hoarseness: Thought 2/2 mediastinal mass vs irritation from prior biopsy. Following with ENT, s/p vocal cord injection 7/1/21. Following with voice clinic.      9. Pulm/Cards  Working with speech on laryngreal dysfunction.     SOB improved. CT stable. Echo stable.      Continue Guaifenesin-Coedine PRN for cough, much improved. Refilled.       Off statin due to LFT elevation, improved.      10. FEN  Hypokalemia: Recurrent issue, continue 20meq daily.      Hypophosphatemia: Continue 250mg phos BID for recurrent  issues.      Renal insufficiency: Doing better, creat WNL now.      Previously referred to medical cannabis program for poor appetite related to malignancy/treatment.      11. Psych/Neuro  Anxiety/depression: Improved, on Lexapro and feels like it is helping. Continue.      Continue PRN Imitrex for migraines.      10. Heme  Pancytopenia 2/2 chemotherapy. Monitor. Transfuse if hgb <8. Defer chemo if plt <75 or ANC <1. Deferring treatment as above.      25 minutes spent on the date of the encounter doing chart review, review of test results, interpretation of tests, patient visit and documentation     Maynor Rios PA-C  Department of Hematology and Oncology  [facility]         Again, thank you for allowing me to participate in the care of your patient.        Sincerely,        GERALDO Mullins

## 2022-09-28 NOTE — LETTER
9/28/2022         RE: Ifrah Huitron  [address] [place] [address]        Dear Colleague,    Thank you for referring your patient, Ifrah Huitron, to the [facility]. Please see a copy of my visit note below.    Ifrah is a 72 year old who is being evaluated via a billable video visit.  Currently in [place].    How would you like to obtain your AVS? MyChart  If the video visit is dropped, the invitation should be resent by: Text to cell phone: [phone]  Will anyone else be joining your video visit? Yes, patients wife.     Laurie Carlitos, JENNIFER      Video-Visit Details    Video Start Time: 2:35pm    Type of service:  Video Visit    Video End Time:2:50pm    Originating Location (pt. Location): Home    Distant Location (provider location):  [facility]     Platform used for Video Visit: [employer]      Oncology/Hematology Visit Note  Sep 28, 2022    Reason for Visit: Follow up of spindle cell sarcoma     History of Present Illness: Ifrah Huitron is a 72 year old male with PMH rosacea, pituitary macroadenoma s/p resection, GERD, kidney stones, DJD with metastatic spindle cell sarcoma. He presented to his PCP March 2021 with chest pain/left shoulder and back pain that led to imaging which revealed multiple lung mets. There were difficulties in getting diagnostic biopsy, eventually biopsy of mediastinal mass with spindle cell sarcoma. There was high PD-L1 expression (90%). Baseline imaging 6/21/21 with progression of lung mets and left-sided subdiaphragmatic mass, stable liver lesions. 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      He started Keytruda 6/21/21. CT 8/2/21 with positive response to treatment. CT 10/18/21 with mixed response- LUQ mass larger, anterior mediastinal and left anterior pleural mass smaller. Keytruda stopped.     Started on Doxil + Ifosfamide  "10/26/21. Poorly tolerated with mucositis, nausea, poor oral intake. CT with stable to positive response.      Received C2 12/1/21 (delayed for tolerance issues) with 10% dose reduction.     Received C3 1/4/22 with same 10% dose reduction. He had issues with nausea inpatient along with recurrent thrush.      Received C4 2/2/22 with same 10% dose reduction. Had more significant neurotoxicity so only received 5.5 days. Symptoms resolved after stopping Ifosfamide.      CT 3/8/22 with positive response to treatment.      He was on Doxil alone but has been dealing with worsening mouth and skin toxicity and has required delays and dose reductions. CT 8/8/22 with disease progression.     Switched to Gemzar 8/10/22.    Interval History:  Ifrah is doing very well today after having a very rough time after his last chemo. His mouth sores are much improved on Doxepin, eating and drinking well. Still having edema and going to wear compression stockings. No recurrent fevers. Had thorough visit with Malorie Castrejon NP in [place] yesterday.     Current Outpatient Medications   Medication Sig Dispense Refill     clotrimazole (MYCELEX) 10 MG lozenge Place 1 lozenge (10 mg) inside cheek 5 times daily (Patient not taking: Reported on 9/27/2022) 90 lozenge 1     doxepin (SINEQUAN) 10 MG/ML (HIGH CONC) solution Take 2.5 mLs (25 mg) by mouth 2 times daily as needed (rinse for mucositis) Dilute the 2.5 mL of doxepin to 5 ml total in sterile or distilled water. 118 mL 0     escitalopram (LEXAPRO) 10 MG tablet Take 1 tablet (10 mg) by mouth daily 90 tablet 3     guaiFENesin-codeine (GUAIFENESIN AC) 100-10 MG/5ML syrup Take 10 mLs by mouth every 4 hours as needed for cough 118 mL 0     hydrocortisone (CORTEF) 10 MG tablet Take 20 mg in the morning and 10 mg in the afternoon 270 tablet 3     Insulin Syringe-Needle U-100 27.5G X 5/8\" 2 ML MISC 1 each daily as needed (with solucortef for adrenal crisis) 10 each 1     levothyroxine (SYNTHROID/LEVOTHROID) " 75 MCG tablet Take 1 tablet (75 mcg) by mouth every morning 90 tablet 3     Menthol-Methyl Salicylate (DALIA MALIK GREASELESS) cream Apply topically 2 times daily       metroNIDAZOLE (METROGEL) 1 % external gel Apply topically daily .Try stronger dose due to increased symptoms after chemo. (Patient not taking: Reported on 9/27/2022) 30 g 0     omeprazole (PRILOSEC) 20 MG DR capsule Take 1 capsule (20 mg) by mouth 2 times daily 60 capsule 1     ondansetron (ZOFRAN) 4 MG tablet Take 1-2 tablets (4-8 mg) by mouth every 8 hours as needed for nausea 60 tablet 3     phosphorus tablet 250 mg (PHOSPHA 250 NEUTRAL) 250 MG per tablet Take 1 tablet (250 mg) by mouth 2 times daily 60 tablet 3     potassium chloride ER (KLOR-CON M) 20 MEQ CR tablet Take 1 tablet (20 mEq) by mouth daily 90 tablet 3     prochlorperazine (COMPAZINE) 5 MG tablet Take 1 tablet (5 mg) by mouth every 6 hours as needed for nausea or vomiting . Caution: causes sedation. (Patient not taking: Reported on 9/27/2022) 30 tablet 1     SUMAtriptan (IMITREX) 50 MG tablet Take 1 tablet (50 mg) by mouth at onset of headache for migraine May repeat in 2 hours. Max 4 tablets/24 hours. (Patient taking differently: Take 50 mg by mouth at onset of headache for migraine May repeat in 2 hours. Max 4 tablets/24 hours. PRN) 10 tablet 3     triamcinolone (KENALOG) 0.1 % external cream Apply topically 2 times daily to bothersome skin areas. 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     Thyroid disease     removed pituitary gland     Past Surgical History:   Procedure Laterality Date     COLONOSCOPY N/A 12/17/2020    Procedure: COLONOSCOPY;  Surgeon: Ken Camacho MD;  Location: [facility]     ENT SURGERY       HERNIA REPAIR       INSERT PORT VASCULAR ACCESS Right 1/28/2022    Procedure: INSERTION, VASCULAR ACCESS PORT;  Surgeon: Daniel Kinney MD;   Location: [facility] OR     IR CHEST PORT PLACEMENT > 5 YRS OF AGE  1/28/2022     LARYNGOSCOPY, EXCISE VOCAL CORD LESION MICROSCOPIC, COMBINED Left 07/01/2021    Procedure: MICROLARYNGOSCOPY, LEFT TRUE VOCAL CORD INJECTION WITH PROLARYN;  Surgeon: Kyree Bearden MD;  Location: [facility]     PHACOEMULSIFICATION WITH STANDARD INTRAOCULAR LENS IMPLANT Right 03/10/2021    Procedure: Cataract removal with implant.;  Surgeon: Jamir Mac MD;  Location: [facility]     PHACOEMULSIFICATION WITH STANDARD INTRAOCULAR LENS IMPLANT Left 04/05/2021    Procedure: Cataract removal with implant.;  Surgeon: Jamir Mac MD;  Location: [facility]     PICC DOUBLE LUMEN PLACEMENT Right 12/01/2021    5FR DL PICC, basilic vein. L-38cm, 1cm out.     PICC DOUBLE LUMEN PLACEMENT Right 01/04/2022    Right cephalic, 41 cm, 1 external length     PITUITARY EXCISION       tooth pulled 4/7  Right      Allergies   Allergen Reactions     Penicillins Hives and Swelling            Social History   Social History     Tobacco Use     Smoking status: Never Smoker     Smokeless tobacco: Never Used   Substance Use Topics     Alcohol use: Yes     Comment: rare     Drug use: Never      Past medical history and social history were reviewed.    Physical Examination:  There were no vitals taken for this visit.  Wt Readings from Last 10 Encounters:   09/27/22 65.3 kg (144 lb)   09/18/22 61.2 kg (135 lb)   09/15/22 63.1 kg (139 lb 3.2 oz)   09/08/22 63.2 kg (139 lb 6.4 oz)   08/25/22 62.3 kg (137 lb 6.4 oz)   08/10/22 63.4 kg (139 lb 11.2 oz)   07/12/22 60.4 kg (133 lb 3.2 oz)   06/27/22 60.3 kg (133 lb)   06/09/22 62.1 kg (137 lb)   04/26/22 64 kg (141 lb 3.2 oz)     Video physical exam  General: Patient appears well in no acute distress.   Skin: No visualized rash or lesions on visualized skin  Eyes: EOMI, no erythema, sclera icterus or discharge noted  Resp: Appears to be breathing comfortably without accessory muscle usage, speaking in full sentences, no  cough  MSK: Appears to have normal range of motion based on visualized movements  Neurologic: No apparent tremors, facial movements symmetric  Psych: affect normal, alert and oriented    Laboratory Data:     Latest Reference Range & Units 09/27/22 09:13   Sodium 136 - 145 mmol/L 140   Potassium 3.4 - 5.3 mmol/L 3.5   Chloride 98 - 107 mmol/L 104   Carbon Dioxide (CO2) 22 - 29 mmol/L 27   Urea Nitrogen 8.0 - 23.0 mg/dL 21.8   Creatinine 0.67 - 1.17 mg/dL 1.07   GFR Estimate >60 mL/min/1.73m2 73   Calcium 8.8 - 10.2 mg/dL 8.6 (L)   Anion Gap 7 - 15 mmol/L 9   Magnesium 1.7 - 2.3 mg/dL 1.9   Phosphorus 2.5 - 4.5 mg/dL 2.4 (L)   Albumin 3.5 - 5.2 g/dL 3.4 (L)   Protein Total 6.4 - 8.3 g/dL 5.9 (L)   Alkaline Phosphatase 40 - 129 U/L 174 (H)   ALT 10 - 50 U/L 59 (H)   AST 10 - 50 U/L 36   Bilirubin Total <=1.2 mg/dL 0.2   Glucose 70 - 99 mg/dL 137 (H)   WBC 4.0 - 11.0 10e3/uL 4.0   Hemoglobin 13.3 - 17.7 g/dL 7.8 (L)   Hematocrit 40.0 - 53.0 % 24.8 (L)   Platelet Count 150 - 450 10e3/uL 64 (L)   RBC Count 4.40 - 5.90 10e6/uL 2.61 (L)   MCV 78 - 100 fL 95   MCH 26.5 - 33.0 pg 29.9   MCHC 31.5 - 36.5 g/dL 31.5   RDW 10.0 - 15.0 % 16.7 (H)   % Neutrophils % 49   % Lymphocytes % 33   % Monocytes % 14   % Eosinophils % 1   % Basophils % 0   % Metamyelocytes % 2   % Myelocytes % 1   Absolute Basophils 0.0 - 0.2 10e3/uL 0.0   NRBC/W <=0 % 1 (H)   Absolute Neutrophil 1.6 - 8.3 10e3/uL 2.0   Absolute Lymphocytes 0.8 - 5.3 10e3/uL 1.3   Absolute Monocytes 0.0 - 1.3 10e3/uL 0.6   Absolute Eosinophils 0.0 - 0.7 10e3/uL 0.0   (L): Data is abnormally low  (H): Data is abnormally high    Assessment and Plan:  1. 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 High PD-L1. Was on Keytruda but had progression, started Doxil + Ifos started 10/26/21. Completed 4 cycles with positive response but had significant toxicities (nausea/vomiting, dehydration, neurotoxicity, mucositis, skin toxicity,  pancytopenia, hypokalemia, hypophosphatemia). Was on Doxil alone March 2022-July 2022. Due to disease progression switched to Gemzar 8/10/22.    Had difficulties with day 1 day 8 treatment this cycle with fever, swelling, mucositis, pancytopenia requiring pRBC. Plt now 64K. DEFER cycle 3 day 1 by one week to 10/6/22.    Given toxicities and cytopenias will also change treatment to be day 1 and day 15 moving forward (will be due for cycle 3 day 15 on 10/20/22). No dose reduction yet, will see if he tolerates better on this schedule.    Keep plan for repeat CT mid October.     2. Mucositis  Recurrent, ongoing with all treatment we have done. Refractory to multiple interventions. Now finally improved with Doxepin. Continue.     3. Edema  2/2 gemcitabine. ED work-up negative for infection. US negative for DVT. Continue compression stockings and no IVF this week.      4. GI  Dyspepsia: Continue Omeprazole 40mg daily. Continue Tums PRN     CINV: Zofran PRN.      5. 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 Tao. Stable.     Hypothyroidism continue Synthroid 75mcg daily, endo following      6. Derm  Rosacea: Long standing issue, continue Metrogel PRN     Hand/foot: 2/2 Doxil, improved. No issues now that he is off Doxil.     7. MSK  Left shoulder/back/chest wall pain 2/2 tumor, following with palliative. Minimal pain currently, off all pain medications.  Tylenol PRN.     8. ENT  Hoarseness: Thought 2/2 mediastinal mass vs irritation from prior biopsy. Following with ENT, s/p vocal cord injection 7/1/21. Following with voice clinic.      9. Pulm/Cards  Working with speech on laryngreal dysfunction.     SOB improved. CT stable. Echo stable.      Continue Guaifenesin-Coedine PRN for cough, much improved. Refilled.       Off statin due to LFT elevation, improved.      10. FEN  Hypokalemia: Recurrent issue, continue 20meq daily.      Hypophosphatemia: Continue 250mg phos BID for recurrent  issues.      Renal insufficiency: Doing better, creat WNL now.      Previously referred to medical cannabis program for poor appetite related to malignancy/treatment.      11. Psych/Neuro  Anxiety/depression: Improved, on Lexapro and feels like it is helping. Continue.      Continue PRN Imitrex for migraines.      10. Heme  Pancytopenia 2/2 chemotherapy. Monitor. Transfuse if hgb <8. Defer chemo if plt <75 or ANC <1. Deferring treatment as above.      25 minutes spent on the date of the encounter doing chart review, review of test results, interpretation of tests, patient visit and documentation     Maynor Rios PA-C  Department of Hematology and Oncology  [facility]         Again, thank you for allowing me to participate in the care of your patient.        Sincerely,        GERALDO Mullins

## 2022-10-06 NOTE — PROGRESS NOTES
"Infusion Nursing Note:  Ifrah Huitron presents today for Gemcitabine and fluids.    Patient seen by provider today: No   present during visit today: Not Applicable.    Note: Pt stated he has been having migraines everyday. They usually last about 30 minutes and he gets \"gray\" lines in his vision. He says sometimes he gets very severe migraines that last a long time and he gets \"white and black\" lines in his vision and gets very sick. Inbasket sent to GERALDO Mullins to update on condition. Per NP Richar Kelly, he should follow up with PCP on this issue.    Intravenous Access:  Implanted Port.    Treatment Conditions:  Lab Results   Component Value Date    HGB 7.9 (L) 10/06/2022    WBC 12.3 (H) 10/06/2022    ANEU 7.4 10/06/2022    ANEUTAUTO 2.6 09/22/2022     10/06/2022      Lab Results   Component Value Date     10/06/2022    POTASSIUM 3.7 10/06/2022    MAG 2.2 10/06/2022    CR 1.12 10/06/2022    COTY 8.9 10/06/2022    BILITOTAL <0.2 10/06/2022    ALBUMIN 3.4 (L) 10/06/2022    ALT 41 10/06/2022    AST 37 10/06/2022     Results reviewed, labs MET treatment parameters, ok to proceed with treatment.    Post Infusion Assessment:  Patient tolerated infusion without incident.  Blood return noted pre and post infusion.  Site patent and intact, free from redness, edema or discomfort.  No evidence of extravasations.  Access discontinued per protocol.     Discharge Plan:   Copy of AVS reviewed with patient and/or family.  Patient will return 10/13/22 for next appointment.  Patient discharged in stable condition accompanied by: self.  Departure Mode: Ambulatory.      Suzette Riley RN    "

## 2022-10-06 NOTE — PROGRESS NOTES
PAC labs drawn without difficulty via site protocol. Patient tolerated well.    Suzette Riley RN on 10/6/2022 at 9:57 AM

## 2022-10-13 NOTE — PROGRESS NOTES
Infusion Nursing Note:  Ifrah Huitron presents today for labs and IVF.    Patient seen by provider today: No   present during visit today: Not Applicable.    Note: Pt states he is feeling crummy today. Complaints of weakness/ fatigue and headache. His feet/lower legs have edema which is painful making it hard to walk. Slightly more redness and warmth on left leg. Occasional dizziness.    Message sent to GERALDO Cordova. Maynor recommended he return tomorrow for 1 unit of PRBC's.     Intravenous Access:  Implanted Port.    Treatment Conditions:  Not Applicable.    Post Infusion Assessment:  Patient tolerated infusion without incident.  Blood return noted pre and post infusion.  Site patent and intact, free from redness, edema or discomfort.  No evidence of extravasations.  Access discontinued per protocol.     Discharge Plan:   Patient discharged in stable condition accompanied by: self.  Departure Mode: Ambulatory.      Anup Velasco RN

## 2022-10-14 NOTE — PROGRESS NOTES
Infusion Nursing Note:  Ifrah Huitron presents today for 1 unit PRBCs.    Patient seen by provider today: No   present during visit today: Not Applicable.    Note: N/A.    Intravenous Access:  Implanted Port.    Treatment Conditions:  Lab Results   Component Value Date    HGB 7.7 (L) 10/13/2022    WBC 6.2 10/13/2022    ANEU 7.4 10/06/2022    ANEUTAUTO 3.9 10/13/2022     (L) 10/13/2022      Results reviewed, labs MET treatment parameters, ok to proceed with treatment. Consent signed 2/17/22    Post Infusion Assessment:  Patient tolerated infusion without incident.  Blood return noted pre and post infusion.  Site patent and intact, free from redness, edema or discomfort.  No evidence of extravasations.  Access discontinued per protocol.     Discharge Plan:   Patient discharged in stable condition accompanied by: self.  Departure Mode: Ambulatory.      Warren Santana RN

## 2022-10-17 NOTE — LETTER
[facility] [place]  [address]  [facility] [address]  Phone: [phone]    2022    Ifrah Huitron                                                                                                                       [address] [place] [address]            Dear Mr. Huitron,    We recently provided you with a medication refill. Prescription medications require routine follow-up appointments with your Dermatology Provider.      Per [facility] medication refill protocol, you do need to be seen at least annually to renew a prescription while on prescribed medication(s). A prescription is valid for only one year before it expires.      At this time we ask that: You schedule a routine follow up Dermatology office visit to follow your Rosacea and renew your now  prescription.    Your prescription: Has  as it is over 1 year old. You have been given a one time omar refill of medication.     We are currently booking Dermatology appointments into 2023, so please schedule follow up Dermatology appointment as soon as possible to avoid going without medication.    [phone] to schedule or you may schedule via My chart. You may be seen for follow up with any of our 3 Dermatology Providers via telephone or virtual video if you prefer.     Thank you,      Fabiola CHOW / braeden

## 2022-10-17 NOTE — TELEPHONE ENCOUNTER
[facility]  Ph:  [phone]  Fax:  [phone]      metroNIDAZOLE (METROGEL) 0.75 % external gel     Last office visit: 8/11/2021 with prescribing provider:  BRANDI BRUSH    Future Office Visit:      Arcenio Weiss  [facility]

## 2022-10-18 NOTE — TELEPHONE ENCOUNTER
> 1 year. Needs appointment. Letter sent and note sent to pharmacy as well. Jihan Mcdonough RN

## 2022-10-18 NOTE — TELEPHONE ENCOUNTER
Last OV 8/11/21.  F/u as needed. None scheduled currently.    Received refill request for Metrogel.  Routing refill request to provider for review/approval because:  Patient needs to be seen because it has been more than 1 year since last office visit.    Please advise.  Kelsea Yadav RN on 10/18/2022 at 11:03 AM

## 2022-10-19 NOTE — LETTER
10/19/2022         RE: Ifrah Huitron  [id number] [place] [address]        Dear Colleague,    Thank you for referring your patient, Ifrah Huitron, to the [facility]. Please see a copy of my visit note below.    Ifrah is a 72 year old who is being evaluated via a billable video visit.  Currently in [place].    How would you like to obtain your AVS? MyChart  If the video visit is dropped, the invitation should be resent by: Text to cell phone: [phone]  Will anyone else be joining your video visit? Yes, patients wife.     Laurie Carlitos, JENNIFER    Video-Visit Details    Video Start Time: 1:30pm    Type of service:  Video Visit    Video End Time:1:50pm    Originating Location (pt. Location): Home        Distant Location (provider location):  On-site    Platform used for Video Visit: [facility]      Oncology/Hematology Visit Note  Oct 19, 2022    Reason for Visit: Follow up of spindle cell sarcoma     History of Present Illness: Ifrah Huitron is a 72 year old male with PMH rosacea, pituitary macroadenoma s/p resection, GERD, kidney stones, DJD with metastatic spindle cell sarcoma. He presented to his PCP March 2021 with chest pain/left shoulder and back pain that led to imaging which revealed multiple lung mets. There were difficulties in getting diagnostic biopsy, eventually biopsy of mediastinal mass with spindle cell sarcoma. There was high PD-L1 expression (90%). Baseline imaging 6/21/21 with progression of lung mets and left-sided subdiaphragmatic mass, stable liver lesions. 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      He started Keytruda 6/21/21. CT 8/2/21 with positive response to treatment. CT 10/18/21 with mixed response- LUQ mass larger, anterior mediastinal and left anterior pleural mass smaller. Keytruda stopped.     Started on Doxil + Ifosfamide 10/26/21. Poorly tolerated with mucositis,  nausea, poor oral intake. CT with stable to positive response.      Received C2 12/1/21 (delayed for tolerance issues) with 10% dose reduction.     Received C3 1/4/22 with same 10% dose reduction. He had issues with nausea inpatient along with recurrent thrush.      Received C4 2/2/22 with same 10% dose reduction. Had more significant neurotoxicity so only received 5.5 days. Symptoms resolved after stopping Ifosfamide.      CT 3/8/22 with positive response to treatment.        He was on Doxil alone but has been dealing with worsening mouth and skin toxicity and has required delays and dose reductions. CT 8/8/22 with disease progression.     Switched to Gemzar 8/10/22.    Interval History:  Ifrah is feeling well now. He did have a lot of issues this last cycle with fatigue, weakness, leg swelling and warmth, mouth sores. He improved after the blood transfusion though legs are still swollen and skin is flaking. Mouth is improved with Doxepin. He is eating better now. He notes he is usually cold. He otherwise has no complaints and we reviewed his CT and next steps.     Review of Systems:  Patient denies fevers, chills, night sweats, unexplained weight changes, headaches, dizziness, vision or hearing changes, new lumps or bumps, chest pain, shortness of breath, cough, abdominal pain, nausea, vomiting, changes to bowel or bladder, swelling of extremities, bleeding issues, or rash.    Current Outpatient Medications   Medication Sig Dispense Refill     clotrimazole (MYCELEX) 10 MG lozenge Place 1 lozenge (10 mg) inside cheek 5 times daily (Patient not taking: Reported on 9/27/2022) 90 lozenge 1     doxepin (SINEQUAN) 10 MG/ML (HIGH CONC) solution Take 2.5 mLs (25 mg) by mouth 2 times daily as needed (rinse for mucositis) Dilute the 2.5 mL of doxepin to 5 ml total in sterile or distilled water. 118 mL 0     escitalopram (LEXAPRO) 10 MG tablet Take 1 tablet (10 mg) by mouth daily 90 tablet 3     guaiFENesin-codeine  "(GUAIFENESIN AC) 100-10 MG/5ML syrup Take 10 mLs by mouth every 4 hours as needed for cough 118 mL 0     hydrocortisone (CORTEF) 10 MG tablet Take 20 mg in the morning and 10 mg in the afternoon 270 tablet 3     Insulin Syringe-Needle U-100 27.5G X 5/8\" 2 ML MISC 1 each daily as needed (with solucortef for adrenal crisis) 10 each 1     levothyroxine (SYNTHROID/LEVOTHROID) 75 MCG tablet Take 1 tablet (75 mcg) by mouth every morning 90 tablet 3     Menthol-Methyl Salicylate (DALIA MALIK GREASELESS) cream Apply topically 2 times daily       metroNIDAZOLE (METROGEL) 1 % external gel Apply topically daily Try stronger dose due to increased symptoms after chemo. 30 g 0     omeprazole (PRILOSEC) 20 MG DR capsule Take 1 capsule (20 mg) by mouth 2 times daily 60 capsule 1     ondansetron (ZOFRAN) 4 MG tablet Take 1-2 tablets (4-8 mg) by mouth every 8 hours as needed for nausea 60 tablet 3     phosphorus tablet 250 mg (PHOSPHA 250 NEUTRAL) 250 MG per tablet Take 1 tablet (250 mg) by mouth 2 times daily 60 tablet 3     potassium chloride ER (KLOR-CON M) 20 MEQ CR tablet Take 1 tablet (20 mEq) by mouth daily 90 tablet 3     prochlorperazine (COMPAZINE) 5 MG tablet Take 1 tablet (5 mg) by mouth every 6 hours as needed for nausea or vomiting . Caution: causes sedation. (Patient not taking: Reported on 9/27/2022) 30 tablet 1     SUMAtriptan (IMITREX) 50 MG tablet Take 1 tablet (50 mg) by mouth at onset of headache for migraine May repeat in 2 hours. Max 4 tablets/24 hours. (Patient taking differently: Take 50 mg by mouth at onset of headache for migraine May repeat in 2 hours. Max 4 tablets/24 hours. PRN) 10 tablet 3     triamcinolone (KENALOG) 0.1 % external cream Apply topically 2 times daily to bothersome skin areas. 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     Thyroid disease     removed pituitary gland "     Past Surgical History:   Procedure Laterality Date     COLONOSCOPY N/A 12/17/2020    Procedure: COLONOSCOPY;  Surgeon: Ken Camacho MD;  Location: [facility]     ENT SURGERY       HERNIA REPAIR       INSERT PORT VASCULAR ACCESS Right 1/28/2022    Procedure: INSERTION, VASCULAR ACCESS PORT;  Surgeon: Daniel Kinney MD;  [place] [facility] [place]     IR CHEST PORT PLACEMENT > 5 YRS OF AGE  1/28/2022     LARYNGOSCOPY, EXCISE VOCAL CORD LESION MICROSCOPIC, COMBINED Left 07/01/2021    Procedure: MICROLARYNGOSCOPY, LEFT TRUE VOCAL CORD INJECTION WITH PROLARYN;  Surgeon: Kyree Bearden MD;  Location: [facility]     PHACOEMULSIFICATION WITH STANDARD INTRAOCULAR LENS IMPLANT Right 03/10/2021    Procedure: Cataract removal with implant.;  Surgeon: Jamir Mac MD;  Location: [facility]     PHACOEMULSIFICATION WITH STANDARD INTRAOCULAR LENS IMPLANT Left 04/05/2021    Procedure: Cataract removal with implant.;  Surgeon: Jamir Mac MD;  Location: [facility]     PICC DOUBLE LUMEN PLACEMENT Right 12/01/2021    5FR DL PICC, basilic vein. L-38cm, 1cm out.     PICC DOUBLE LUMEN PLACEMENT Right 01/04/2022    Right cephalic, 41 cm, 1 external length     PITUITARY EXCISION       tooth pulled 4/7  Right      Allergies   Allergen Reactions     Penicillins Hives and Swelling            Social History   Social History     Tobacco Use     Smoking status: Never     Smokeless tobacco: Never   Substance Use Topics     Alcohol use: Yes     Comment: rare     Drug use: Never      Past medical history and social history were reviewed.    Physical Examination:  There were no vitals taken for this visit.  Wt Readings from Last 10 Encounters:   10/06/22 65 kg (143 lb 3.2 oz)   09/27/22 65.3 kg (144 lb)   09/18/22 61.2 kg (135 lb)   09/15/22 63.1 kg (139 lb 3.2 oz)   09/08/22 63.2 kg (139 lb 6.4 oz)   08/25/22 62.3 kg (137 lb 6.4 oz)   08/10/22 63.4 kg (139 lb 11.2 oz)   07/12/22 60.4 kg (133 lb 3.2 oz)   06/27/22 60.3 kg (133 lb)   06/09/22  62.1 kg (137 lb)     .Video physical exam  General: Patient appears well in no acute distress.   Skin: No visualized rash or lesions on visualized skin  Eyes: EOMI, no erythema, sclera icterus or discharge noted  Resp: Appears to be breathing comfortably without accessory muscle usage, speaking in full sentences, no cough  MSK: Appears to have normal range of motion based on visualized movements  Neurologic: No apparent tremors, facial movements symmetric  Psych: affect normal, alert and oriented    Laboratory Data:   Latest Reference Range & Units 10/19/22 08:02   Sodium 136 - 145 mmol/L 139   Potassium 3.4 - 5.3 mmol/L 3.3 (L)   Chloride 98 - 107 mmol/L 103   Carbon Dioxide (CO2) 22 - 29 mmol/L 26   Urea Nitrogen 8.0 - 23.0 mg/dL 17.6   Creatinine 0.67 - 1.17 mg/dL 1.20 (H)   GFR Estimate >60 mL/min/1.73m2 63   Calcium 8.8 - 10.2 mg/dL 8.9   Anion Gap 7 - 15 mmol/L 10   Albumin 3.5 - 5.2 g/dL 3.2 (L)   Protein Total 6.4 - 8.3 g/dL 5.8 (L)   Alkaline Phosphatase 40 - 129 U/L 208 (H)   ALT 10 - 50 U/L 31   AST 10 - 50 U/L 30   Bilirubin Total <=1.2 mg/dL 0.2   Glucose 70 - 99 mg/dL 137 (H)   WBC 4.0 - 11.0 10e3/uL 9.8   Hemoglobin 13.3 - 17.7 g/dL 8.2 (L)   Hematocrit 40.0 - 53.0 % 25.8 (L)   Platelet Count 150 - 450 10e3/uL 114 (L)   RBC Count 4.40 - 5.90 10e6/uL 2.75 (L)   MCV 78 - 100 fL 94   MCH 26.5 - 33.0 pg 29.8   MCHC 31.5 - 36.5 g/dL 31.8   RDW 10.0 - 15.0 % 18.6 (H)   % Neutrophils % 75   % Lymphocytes % 13   % Monocytes % 8   % Eosinophils % 1   % Basophils % 0   Absolute Basophils 0.0 - 0.2 10e3/uL 0.0   Absolute Eosinophils 0.0 - 0.7 10e3/uL 0.1   Absolute Immature Granulocytes <=0.4 10e3/uL 0.3   Absolute Lymphocytes 0.8 - 5.3 10e3/uL 1.3   Absolute Monocytes 0.0 - 1.3 10e3/uL 0.8   % Immature Granulocytes % 3   Absolute Neutrophils 1.6 - 8.3 10e3/uL 7.3   Absolute NRBCs 10e3/uL 0.0   NRBCs per 100 WBC <1 /100 0   (L): Data is abnormally low  (H): Data is abnormally high    CT CAP 10/17/22  FINDINGS:    LUNGS AND PLEURA: 0.2 cm nodule anterolateral left lower lung lobe is  stable. Hypoattenuating nodule in the medial left upper lung lobe  adjacent to the superior mediastinum measures 3.2 x 2.7 x 3.7 cm cm in  the AP, transverse and craniocaudal dimensions respectively, slightly  qualitatively increased in size since the prior study dated 8/8/2022  where it measured 3.3 x 2.3 x 3.8 cm. No other significant pulmonary  nodule or mass is identified. No infiltrate, effusion, or  pneumothorax. There is mild pleural thickening along the posterior  left hemithorax (images 70-95 series 2). This was not definitely seen  on the prior CT dated 8/8/2022 and is of uncertain clinical  significance.     MEDIASTINUM/AXILLAE: Heart is normal in size. There are moderate  coronary artery calcifications. Right chest wall [id number] with  internal jugular central venous catheter are in stable and  satisfactory positions. No mediastinal, hilar, or axillary  lymphadenopathy is identified. Visualized portions of the thyroid are  unremarkable.     HEPATOBILIARY: Low-attenuation subcentimeter liver lesion(s)  compatible with benign cysts or other benign lesions. No specific  evaluation or follow-up is recommended in a low risk patient.  Gallbladder is decompressed but otherwise unremarkable. Liver  otherwise enhances normally. No biliary ductal dilatation,  cholelithiasis or choledocholithiasis is seen.     PANCREAS: Normal.     SPLEEN: There is an irregular mass posterior aspect of the spleen  extending to the posterior hemidiaphragm. This has qualitatively and  quantitatively increased in size since the prior study dated 8/8/2022  and now measures approximately 7.7 x 8.1 x 6.0 cm in the transverse,  AP, and craniocaudal dimensions respectively. This previously measured  6.6 x 8.0 by 5.0 cm.     ADRENAL GLANDS: Normal.     KIDNEYS/BLADDER: Stable hypoattenuating lesions in the kidneys likely  represent cysts. Largest measures up to 1.0  cm in the anterior left  renal cortex. No hydronephrosis, nephrolithiasis, hydroureter, or  ureteral calculus is identified. Kidneys and urinary bladder and  ureters are otherwise normal in appearance.     BOWEL: There are a few scattered colonic diverticula. There is a  moderate amount of stool in the colon. The colon is otherwise grossly  of normal appearance and demonstrates no pericolonic inflammatory  change to suggest diverticulitis or colitis. There is a new  appendicolith in the proximal appendix measuring up to 0.5 x 0.4 cm.  The appendix is otherwise normal in appearance and demonstrates no  adjacent inflammatory change to suggest acute appendicitis. Small  bowel is of normal caliber and appearance. Stomach contains a moderate  to large amount ingested material and air, but is otherwise  unremarkable.     PELVIC ORGANS: Prostate gland is mildly enlarged but otherwise  unremarkable. Seminal vesicles are unremarkable.     ADDITIONAL FINDINGS: There is nonaneurysmal atherosclerosis. No  adenopathy, free fluid, or free air is seen in the peritoneal cavity.     MUSCULOSKELETAL: There are degenerative changes in the spine. No  aggressive osseous lesions or acute osseous fractures are seen.                                                                      IMPRESSION:  1.  Slightly enlarging mass along the posterior superior spleen,  possibly slightly increased hypoattenuating nodular mass in the  anterior left superior mediastinum adjacent to the medial left upper  lung lobe and new posterior left hemithorax pleural thickening are  concerning for subtle progression of patient's sarcoma and metastases.  2.  Atherosclerosis.  3.  Probable renal and hepatic cysts.  4.  Colonic diverticulosis.  5.  No other evidence for metastasis is seen.     ROBERT MONTGOMERY MD     Assessment and Plan:  1. 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 High PD-L1. Was on Keytruda but had  progression, started Doxil + Ifos started 10/26/21. Completed 4 cycles with positive response but had significant toxicities (nausea/vomiting, dehydration, neurotoxicity, mucositis, skin toxicity, pancytopenia, hypokalemia, hypophosphatemia). Was on Doxil alone March 2022-July 2022. Due to disease progression switched to Gemzar 8/10/22.    He has not tolerated Gemzar well-has had fatigue, weakness, cytopenias, edema, skin irritation, mucositis. Reviewed CT from 10/17 and there is concern for slight progression.    Reviewed with Dr. Wolff. Will do treatment break the next couple weeks to ensure improvement for chemo toxicities and then repeat CT. Pending degree of progression likely will switch to different treatment given minimal response to Gemzar and side effects. Will request insurance approval for pazopanib.      2. Mucositis  Recurrent, ongoing with all treatment we have done. Refractory to multiple interventions. Now finally improved with Doxepin. Continue.      3. Edema  2/2 gemcitabine. ED work-up negative for infection. US negative for DVT. Continue compression stockings. Discussed lasix but he prefers to wait [place]. Erythema improving.      4. GI  Dyspepsia: Continue Omeprazole 40mg daily. Continue Tums PRN     CINV: Zofran PRN.      5. 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 Tao. Stable.     Hypothyroidism continue Synthroid 75mcg daily, endo following      6. Derm  Rosacea: Long standing issue, continue Metrogel PRN     Hand/foot: 2/2 Doxil, improved. No issues now that he is off Doxil. Lotion to legs.     7. MSK  Left shoulder/back/chest wall pain 2/2 tumor, following with palliative. Minimal pain currently, off all pain medications.  Tylenol PRN.     8. ENT  Hoarseness: Thought 2/2 mediastinal mass vs irritation from prior biopsy. Following with ENT, s/p vocal cord injection 7/1/21. Following with voice clinic.      9.  Pulm/Cards  Working with speech on laryngreal dysfunction.     SOB improved. CT stable. Echo stable.      Continue Guaifenesin-Coedine PRN for cough, much improved.       Off statin due to LFT elevation, improved.      10. FEN  Hypokalemia: Recurrent issue, continue 20meq daily.      Hypophosphatemia: Continue 250mg phos BID for recurrent issues.      Renal insufficiency: Doing better, still getting fluids PRN.      Previously referred to medical cannabis program for poor appetite related to malignancy/treatment.      11. Psych/Neuro  Anxiety/depression: Improved, on Lexapro and feels like it is helping. Continue.      Continue PRN Imitrex for migraines.      10. Heme  Pancytopenia 2/2 chemotherapy. Did require pRBC last week. Transfuse if hgb <8.    35 minutes spent on the date of the encounter doing chart review, review of test results, interpretation of tests, patient visit, documentation and discussion with other provider(s)     Maynor Rios PA-C  Department of Hematology and Oncology  [facility]         Again, thank you for allowing me to participate in the care of your patient.        Sincerely,        GERALDO Mullins

## 2022-10-19 NOTE — PROGRESS NOTES
Infusion Nursing Note:  Ifrah GREEN Lagi presents today for C3D15 Gemzar.    Patient seen by provider today: Has a video visit with Maynor at 1:30 today   present during visit today: Not Applicable.    Note: Per Mayonr, pt has had some slight progression, according to the CT scan from yesterday and she still needs to review the scan with Dr. Wolff. Pt's creat is also still slightly elevated. Maynor would like pt to have 1L of IVF's today, no chemo. She will see him at 1:30. We are not sure why his chemo appt was scheduled before the video visit appt. Pt states that he has his car packed and is heading for the cabin (in [place]) after his appt today.    Intravenous Access:  Implanted Port.    Treatment Conditions:  As noted above - no chemo today.    Post Infusion Assessment:  Patient tolerated infusion without incident.  Blood return noted pre and post infusion.  Site patent and intact, free from redness, edema or discomfort.  No evidence of extravasations.  Access discontinued per protocol.     Discharge Plan:   Patient discharged in stable condition accompanied by: self.  Departure Mode: Ambulatory.      Maria Simon RN

## 2022-10-19 NOTE — LETTER
10/19/2022         RE: Ifrah Huitron  [id number] [place] [address]        Dear Colleague,    Thank you for referring your patient, Ifrah Huitron, to the [facility]. Please see a copy of my visit note below.    Ifrah is a 72 year old who is being evaluated via a billable video visit.  Currently in [place].    How would you like to obtain your AVS? MyChart  If the video visit is dropped, the invitation should be resent by: Text to cell phone: [phone]  Will anyone else be joining your video visit? Yes, patients wife.     Laurie Carlitos, JENNIFER    Video-Visit Details    Video Start Time: 1:30pm    Type of service:  Video Visit    Video End Time:1:50pm    Originating Location (pt. Location): Home        Distant Location (provider location):  On-site    Platform used for Video Visit: [facility]      Oncology/Hematology Visit Note  Oct 19, 2022    Reason for Visit: Follow up of spindle cell sarcoma     History of Present Illness: Ifrah Huitron is a 72 year old male with PMH rosacea, pituitary macroadenoma s/p resection, GERD, kidney stones, DJD with metastatic spindle cell sarcoma. He presented to his PCP March 2021 with chest pain/left shoulder and back pain that led to imaging which revealed multiple lung mets. There were difficulties in getting diagnostic biopsy, eventually biopsy of mediastinal mass with spindle cell sarcoma. There was high PD-L1 expression (90%). Baseline imaging 6/21/21 with progression of lung mets and left-sided subdiaphragmatic mass, stable liver lesions. 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      He started Keytruda 6/21/21. CT 8/2/21 with positive response to treatment. CT 10/18/21 with mixed response- LUQ mass larger, anterior mediastinal and left anterior pleural mass smaller. Keytruda stopped.     Started on Doxil + Ifosfamide 10/26/21. Poorly tolerated with mucositis,  nausea, poor oral intake. CT with stable to positive response.      Received C2 12/1/21 (delayed for tolerance issues) with 10% dose reduction.     Received C3 1/4/22 with same 10% dose reduction. He had issues with nausea inpatient along with recurrent thrush.      Received C4 2/2/22 with same 10% dose reduction. Had more significant neurotoxicity so only received 5.5 days. Symptoms resolved after stopping Ifosfamide.      CT 3/8/22 with positive response to treatment.        He was on Doxil alone but has been dealing with worsening mouth and skin toxicity and has required delays and dose reductions. CT 8/8/22 with disease progression.     Switched to Gemzar 8/10/22.    Interval History:  Ifrah is feeling well now. He did have a lot of issues this last cycle with fatigue, weakness, leg swelling and warmth, mouth sores. He improved after the blood transfusion though legs are still swollen and skin is flaking. Mouth is improved with Doxepin. He is eating better now. He notes he is usually cold. He otherwise has no complaints and we reviewed his CT and next steps.     Review of Systems:  Patient denies fevers, chills, night sweats, unexplained weight changes, headaches, dizziness, vision or hearing changes, new lumps or bumps, chest pain, shortness of breath, cough, abdominal pain, nausea, vomiting, changes to bowel or bladder, swelling of extremities, bleeding issues, or rash.    Current Outpatient Medications   Medication Sig Dispense Refill     clotrimazole (MYCELEX) 10 MG lozenge Place 1 lozenge (10 mg) inside cheek 5 times daily (Patient not taking: Reported on 9/27/2022) 90 lozenge 1     doxepin (SINEQUAN) 10 MG/ML (HIGH CONC) solution Take 2.5 mLs (25 mg) by mouth 2 times daily as needed (rinse for mucositis) Dilute the 2.5 mL of doxepin to 5 ml total in sterile or distilled water. 118 mL 0     escitalopram (LEXAPRO) 10 MG tablet Take 1 tablet (10 mg) by mouth daily 90 tablet 3     guaiFENesin-codeine  "(GUAIFENESIN AC) 100-10 MG/5ML syrup Take 10 mLs by mouth every 4 hours as needed for cough 118 mL 0     hydrocortisone (CORTEF) 10 MG tablet Take 20 mg in the morning and 10 mg in the afternoon 270 tablet 3     Insulin Syringe-Needle U-100 27.5G X 5/8\" 2 ML MISC 1 each daily as needed (with solucortef for adrenal crisis) 10 each 1     levothyroxine (SYNTHROID/LEVOTHROID) 75 MCG tablet Take 1 tablet (75 mcg) by mouth every morning 90 tablet 3     Menthol-Methyl Salicylate (DALIA MALIK GREASELESS) cream Apply topically 2 times daily       metroNIDAZOLE (METROGEL) 1 % external gel Apply topically daily Try stronger dose due to increased symptoms after chemo. 30 g 0     omeprazole (PRILOSEC) 20 MG DR capsule Take 1 capsule (20 mg) by mouth 2 times daily 60 capsule 1     ondansetron (ZOFRAN) 4 MG tablet Take 1-2 tablets (4-8 mg) by mouth every 8 hours as needed for nausea 60 tablet 3     phosphorus tablet 250 mg (PHOSPHA 250 NEUTRAL) 250 MG per tablet Take 1 tablet (250 mg) by mouth 2 times daily 60 tablet 3     potassium chloride ER (KLOR-CON M) 20 MEQ CR tablet Take 1 tablet (20 mEq) by mouth daily 90 tablet 3     prochlorperazine (COMPAZINE) 5 MG tablet Take 1 tablet (5 mg) by mouth every 6 hours as needed for nausea or vomiting . Caution: causes sedation. (Patient not taking: Reported on 9/27/2022) 30 tablet 1     SUMAtriptan (IMITREX) 50 MG tablet Take 1 tablet (50 mg) by mouth at onset of headache for migraine May repeat in 2 hours. Max 4 tablets/24 hours. (Patient taking differently: Take 50 mg by mouth at onset of headache for migraine May repeat in 2 hours. Max 4 tablets/24 hours. PRN) 10 tablet 3     triamcinolone (KENALOG) 0.1 % external cream Apply topically 2 times daily to bothersome skin areas. 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     Thyroid disease     removed pituitary gland "     Past Surgical History:   Procedure Laterality Date     COLONOSCOPY N/A 12/17/2020    Procedure: COLONOSCOPY;  Surgeon: Ken Camacho MD;  Location: [facility]     ENT SURGERY       HERNIA REPAIR       INSERT PORT VASCULAR ACCESS Right 1/28/2022    Procedure: INSERTION, VASCULAR ACCESS PORT;  Surgeon: Daniel Kinney MD;  Location: [facility]     IR CHEST PORT PLACEMENT > 5 YRS OF AGE  1/28/2022     LARYNGOSCOPY, EXCISE VOCAL CORD LESION MICROSCOPIC, COMBINED Left 07/01/2021    Procedure: MICROLARYNGOSCOPY, LEFT TRUE VOCAL CORD INJECTION WITH PROLARYN;  Surgeon: Kyree Bearden MD;  Location: [facility]     PHACOEMULSIFICATION WITH STANDARD INTRAOCULAR LENS IMPLANT Right 03/10/2021    Procedure: Cataract removal with implant.;  Surgeon: Jamir Mac MD;  Location: [facility]     PHACOEMULSIFICATION WITH STANDARD INTRAOCULAR LENS IMPLANT Left 04/05/2021    Procedure: Cataract removal with implant.;  Surgeon: Jamir Mac MD;  Location: [facility]     PICC DOUBLE LUMEN PLACEMENT Right 12/01/2021    5FR DL PICC, basilic vein. L-38cm, 1cm out.     PICC DOUBLE LUMEN PLACEMENT Right 01/04/2022    Right cephalic, 41 cm, 1 external length     PITUITARY EXCISION       tooth pulled 4/7  Right      Allergies   Allergen Reactions     Penicillins Hives and Swelling            Social History   Social History     Tobacco Use     Smoking status: Never     Smokeless tobacco: Never   Substance Use Topics     Alcohol use: Yes     Comment: rare     Drug use: Never      Past medical history and social history were reviewed.    Physical Examination:  There were no vitals taken for this visit.  Wt Readings from Last 10 Encounters:   10/06/22 65 kg (143 lb 3.2 oz)   09/27/22 65.3 kg (144 lb)   09/18/22 61.2 kg (135 lb)   09/15/22 63.1 kg (139 lb 3.2 oz)   09/08/22 63.2 kg (139 lb 6.4 oz)   08/25/22 62.3 kg (137 lb 6.4 oz)   08/10/22 63.4 kg (139 lb 11.2 oz)   07/12/22 60.4 kg (133 lb 3.2 oz)   06/27/22 60.3 kg (133 lb)   06/09/22  62.1 kg (137 lb)     .Video physical exam  General: Patient appears well in no acute distress.   Skin: No visualized rash or lesions on visualized skin  Eyes: EOMI, no erythema, sclera icterus or discharge noted  Resp: Appears to be breathing comfortably without accessory muscle usage, speaking in full sentences, no cough  MSK: Appears to have normal range of motion based on visualized movements  Neurologic: No apparent tremors, facial movements symmetric  Psych: affect normal, alert and oriented    Laboratory Data:   Latest Reference Range & Units 10/19/22 08:02   Sodium 136 - 145 mmol/L 139   Potassium 3.4 - 5.3 mmol/L 3.3 (L)   Chloride 98 - 107 mmol/L 103   Carbon Dioxide (CO2) 22 - 29 mmol/L 26   Urea Nitrogen 8.0 - 23.0 mg/dL 17.6   Creatinine 0.67 - 1.17 mg/dL 1.20 (H)   GFR Estimate >60 mL/min/1.73m2 63   Calcium 8.8 - 10.2 mg/dL 8.9   Anion Gap 7 - 15 mmol/L 10   Albumin 3.5 - 5.2 g/dL 3.2 (L)   Protein Total 6.4 - 8.3 g/dL 5.8 (L)   Alkaline Phosphatase 40 - 129 U/L 208 (H)   ALT 10 - 50 U/L 31   AST 10 - 50 U/L 30   Bilirubin Total <=1.2 mg/dL 0.2   Glucose 70 - 99 mg/dL 137 (H)   WBC 4.0 - 11.0 10e3/uL 9.8   Hemoglobin 13.3 - 17.7 g/dL 8.2 (L)   Hematocrit 40.0 - 53.0 % 25.8 (L)   Platelet Count 150 - 450 10e3/uL 114 (L)   RBC Count 4.40 - 5.90 10e6/uL 2.75 (L)   MCV 78 - 100 fL 94   MCH 26.5 - 33.0 pg 29.8   MCHC 31.5 - 36.5 g/dL 31.8   RDW 10.0 - 15.0 % 18.6 (H)   % Neutrophils % 75   % Lymphocytes % 13   % Monocytes % 8   % Eosinophils % 1   % Basophils % 0   Absolute Basophils 0.0 - 0.2 10e3/uL 0.0   Absolute Eosinophils 0.0 - 0.7 10e3/uL 0.1   Absolute Immature Granulocytes <=0.4 10e3/uL 0.3   Absolute Lymphocytes 0.8 - 5.3 10e3/uL 1.3   Absolute Monocytes 0.0 - 1.3 10e3/uL 0.8   % Immature Granulocytes % 3   Absolute Neutrophils 1.6 - 8.3 10e3/uL 7.3   Absolute NRBCs 10e3/uL 0.0   NRBCs per 100 WBC <1 /100 0   (L): Data is abnormally low  (H): Data is abnormally high    CT CAP 10/17/22  FINDINGS:    LUNGS AND PLEURA: 0.2 cm nodule anterolateral left lower lung lobe is  stable. Hypoattenuating nodule in the medial left upper lung lobe  adjacent to the superior mediastinum measures 3.2 x 2.7 x 3.7 cm cm in  the AP, transverse and craniocaudal dimensions respectively, slightly  qualitatively increased in size since the prior study dated 8/8/2022  where it measured 3.3 x 2.3 x 3.8 cm. No other significant pulmonary  nodule or mass is identified. No infiltrate, effusion, or  pneumothorax. There is mild pleural thickening along the posterior  left hemithorax (images 70-95 series 2). This was not definitely seen  on the prior CT dated 8/8/2022 and is of uncertain clinical  significance.     MEDIASTINUM/AXILLAE: Heart is normal in size. There are moderate  coronary artery calcifications. Right chest wall [id number]-[id number] with  internal jugular central venous catheter are in stable and  satisfactory positions. No mediastinal, hilar, or axillary  lymphadenopathy is identified. Visualized portions of the thyroid are  unremarkable.     HEPATOBILIARY: Low-attenuation subcentimeter liver lesion(s)  compatible with benign cysts or other benign lesions. No specific  evaluation or follow-up is recommended in a low risk patient.  Gallbladder is decompressed but otherwise unremarkable. Liver  otherwise enhances normally. No biliary ductal dilatation,  cholelithiasis or choledocholithiasis is seen.     PANCREAS: Normal.     SPLEEN: There is an irregular mass posterior aspect of the spleen  extending to the posterior hemidiaphragm. This has qualitatively and  quantitatively increased in size since the prior study dated 8/8/2022  and now measures approximately 7.7 x 8.1 x 6.0 cm in the transverse,  AP, and craniocaudal dimensions respectively. This previously measured  6.6 x 8.0 by 5.0 cm.     ADRENAL GLANDS: Normal.     KIDNEYS/BLADDER: Stable hypoattenuating lesions in the kidneys likely  represent cysts. Largest measures up to 1.0  cm in the anterior left  renal cortex. No hydronephrosis, nephrolithiasis, hydroureter, or  ureteral calculus is identified. Kidneys and urinary bladder and  ureters are otherwise normal in appearance.     BOWEL: There are a few scattered colonic diverticula. There is a  moderate amount of stool in the colon. The colon is otherwise grossly  of normal appearance and demonstrates no pericolonic inflammatory  change to suggest diverticulitis or colitis. There is a new  appendicolith in the proximal appendix measuring up to 0.5 x 0.4 cm.  The appendix is otherwise normal in appearance and demonstrates no  adjacent inflammatory change to suggest acute appendicitis. Small  bowel is of normal caliber and appearance. Stomach contains a moderate  to large amount ingested material and air, but is otherwise  unremarkable.     PELVIC ORGANS: Prostate gland is mildly enlarged but otherwise  unremarkable. Seminal vesicles are unremarkable.     ADDITIONAL FINDINGS: There is nonaneurysmal atherosclerosis. No  adenopathy, free fluid, or free air is seen in the peritoneal cavity.     MUSCULOSKELETAL: There are degenerative changes in the spine. No  aggressive osseous lesions or acute osseous fractures are seen.                                                                      IMPRESSION:  1.  Slightly enlarging mass along the posterior superior spleen,  possibly slightly increased hypoattenuating nodular mass in the  anterior left superior mediastinum adjacent to the medial left upper  lung lobe and new posterior left hemithorax pleural thickening are  concerning for subtle progression of patient's sarcoma and metastases.  2.  Atherosclerosis.  3.  Probable renal and hepatic cysts.  4.  Colonic diverticulosis.  5.  No other evidence for metastasis is seen.     ROBERT MONTGOMERY MD     Assessment and Plan:  1. 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 High PD-L1. Was on Keytruda but had  progression, started Doxil + Ifos started 10/26/21. Completed 4 cycles with positive response but had significant toxicities (nausea/vomiting, dehydration, neurotoxicity, mucositis, skin toxicity, pancytopenia, hypokalemia, hypophosphatemia). Was on Doxil alone March 2022-July 2022. Due to disease progression switched to Gemzar 8/10/22.    He has not tolerated Gemzar well-has had fatigue, weakness, cytopenias, edema, skin irritation, mucositis. Reviewed CT from 10/17 and there is concern for slight progression.    Reviewed with Dr. Wolff. Will do treatment break the next couple weeks to ensure improvement for chemo toxicities and then repeat CT. Pending degree of progression likely will switch to different treatment given minimal response to Gemzar and side effects. Will request insurance approval for pazopanib.      2. Mucositis  Recurrent, ongoing with all treatment we have done. Refractory to multiple interventions. Now finally improved with Doxepin. Continue.      3. Edema  2/2 gemcitabine. ED work-up negative for infection. US negative for DVT. Continue compression stockings. Discussed lasix but he prefers to wait [place]. Erythema improving.      4. GI  Dyspepsia: Continue Omeprazole 40mg daily. Continue Tums PRN     CINV: Zofran PRN.      5. 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 Tao. Stable.     Hypothyroidism continue Synthroid 75mcg daily, endo following      6. Derm  Rosacea: Long standing issue, continue Metrogel PRN     Hand/foot: 2/2 Doxil, improved. No issues now that he is off Doxil. Lotion to legs.     7. MSK  Left shoulder/back/chest wall pain 2/2 tumor, following with palliative. Minimal pain currently, off all pain medications.  Tylenol PRN.     8. ENT  Hoarseness: Thought 2/2 mediastinal mass vs irritation from prior biopsy. Following with ENT, s/p vocal cord injection 7/1/21. Following with voice clinic.      9.  Pulm/Cards  Working with speech on laryngreal dysfunction.     SOB improved. CT stable. Echo stable.      Continue Guaifenesin-Coedine PRN for cough, much improved.       Off statin due to LFT elevation, improved.      10. FEN  Hypokalemia: Recurrent issue, continue 20meq daily.      Hypophosphatemia: Continue 250mg phos BID for recurrent issues.      Renal insufficiency: Doing better, still getting fluids PRN.      Previously referred to medical cannabis program for poor appetite related to malignancy/treatment.      11. Psych/Neuro  Anxiety/depression: Improved, on Lexapro and feels like it is helping. Continue.      Continue PRN Imitrex for migraines.      10. Heme  Pancytopenia 2/2 chemotherapy. Did require pRBC last week. Transfuse if hgb <8.    35 minutes spent on the date of the encounter doing chart review, review of test results, interpretation of tests, patient visit, documentation and discussion with other provider(s)     Maynor Rios PA-C  Department of Hematology and Oncology  [facility]         Again, thank you for allowing me to participate in the care of your patient.        Sincerely,        GERALDO Mullins

## 2022-10-19 NOTE — PROGRESS NOTES
Ifrah is a 72 year old who is being evaluated via a billable video visit.  Currently in [place].    How would you like to obtain your AVS? MyChart  If the video visit is dropped, the invitation should be resent by: Text to cell phone: [phone]  Will anyone else be joining your video visit? Yes, patients wife.     Laurie Rebolledohaway, JENNIFER    Video-Visit Details    Video Start Time: 1:30pm    Type of service:  Video Visit    Video End Time:1:50pm    Originating Location (pt. Location): Home        Distant Location (provider location):  On-site    Platform used for Video Visit: [facility]      Oncology/Hematology Visit Note  Oct 19, 2022    Reason for Visit: Follow up of spindle cell sarcoma     History of Present Illness: Ifrah Huitron is a 72 year old male with PMH rosacea, pituitary macroadenoma s/p resection, GERD, kidney stones, DJD with metastatic spindle cell sarcoma. He presented to his PCP March 2021 with chest pain/left shoulder and back pain that led to imaging which revealed multiple lung mets. There were difficulties in getting diagnostic biopsy, eventually biopsy of mediastinal mass with spindle cell sarcoma. There was high PD-L1 expression (90%). Baseline imaging 6/21/21 with progression of lung mets and left-sided subdiaphragmatic mass, stable liver lesions. 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      He started Keytruda 6/21/21. CT 8/2/21 with positive response to treatment. CT 10/18/21 with mixed response- LUQ mass larger, anterior mediastinal and left anterior pleural mass smaller. Keytruda stopped.     Started on Doxil + Ifosfamide 10/26/21. Poorly tolerated with mucositis, nausea, poor oral intake. CT with stable to positive response.      Received C2 12/1/21 (delayed for tolerance issues) with 10% dose reduction.     Received C3 1/4/22 with same 10% dose reduction. He had issues with nausea inpatient along with recurrent  "thrush.      Received C4 2/2/22 with same 10% dose reduction. Had more significant neurotoxicity so only received 5.5 days. Symptoms resolved after stopping Ifosfamide.      CT 3/8/22 with positive response to treatment.        He was on Doxil alone but has been dealing with worsening mouth and skin toxicity and has required delays and dose reductions. CT 8/8/22 with disease progression.     Switched to Gemzar 8/10/22.    Interval History:  Ifrah is feeling well now. He did have a lot of issues this last cycle with fatigue, weakness, leg swelling and warmth, mouth sores. He improved after the blood transfusion though legs are still swollen and skin is flaking. Mouth is improved with Doxepin. He is eating better now. He notes he is usually cold. He otherwise has no complaints and we reviewed his CT and next steps.     Review of Systems:  Patient denies fevers, chills, night sweats, unexplained weight changes, headaches, dizziness, vision or hearing changes, new lumps or bumps, chest pain, shortness of breath, cough, abdominal pain, nausea, vomiting, changes to bowel or bladder, swelling of extremities, bleeding issues, or rash.    Current Outpatient Medications   Medication Sig Dispense Refill     clotrimazole (MYCELEX) 10 MG lozenge Place 1 lozenge (10 mg) inside cheek 5 times daily (Patient not taking: Reported on 9/27/2022) 90 lozenge 1     doxepin (SINEQUAN) 10 MG/ML (HIGH CONC) solution Take 2.5 mLs (25 mg) by mouth 2 times daily as needed (rinse for mucositis) Dilute the 2.5 mL of doxepin to 5 ml total in sterile or distilled water. 118 mL 0     escitalopram (LEXAPRO) 10 MG tablet Take 1 tablet (10 mg) by mouth daily 90 tablet 3     guaiFENesin-codeine (GUAIFENESIN AC) 100-10 MG/5ML syrup Take 10 mLs by mouth every 4 hours as needed for cough 118 mL 0     hydrocortisone (CORTEF) 10 MG tablet Take 20 mg in the morning and 10 mg in the afternoon 270 tablet 3     Insulin Syringe-Needle U-100 27.5G X 5/8\" 2 ML MISC 1 " each daily as needed (with solucortef for adrenal crisis) 10 each 1     levothyroxine (SYNTHROID/LEVOTHROID) 75 MCG tablet Take 1 tablet (75 mcg) by mouth every morning 90 tablet 3     Menthol-Methyl Salicylate (DALIA MALIK GREASELESS) cream Apply topically 2 times daily       metroNIDAZOLE (METROGEL) 1 % external gel Apply topically daily Try stronger dose due to increased symptoms after chemo. 30 g 0     omeprazole (PRILOSEC) 20 MG DR capsule Take 1 capsule (20 mg) by mouth 2 times daily 60 capsule 1     ondansetron (ZOFRAN) 4 MG tablet Take 1-2 tablets (4-8 mg) by mouth every 8 hours as needed for nausea 60 tablet 3     phosphorus tablet 250 mg (PHOSPHA 250 NEUTRAL) 250 MG per tablet Take 1 tablet (250 mg) by mouth 2 times daily 60 tablet 3     potassium chloride ER (KLOR-CON M) 20 MEQ CR tablet Take 1 tablet (20 mEq) by mouth daily 90 tablet 3     prochlorperazine (COMPAZINE) 5 MG tablet Take 1 tablet (5 mg) by mouth every 6 hours as needed for nausea or vomiting . Caution: causes sedation. (Patient not taking: Reported on 9/27/2022) 30 tablet 1     SUMAtriptan (IMITREX) 50 MG tablet Take 1 tablet (50 mg) by mouth at onset of headache for migraine May repeat in 2 hours. Max 4 tablets/24 hours. (Patient taking differently: Take 50 mg by mouth at onset of headache for migraine May repeat in 2 hours. Max 4 tablets/24 hours. PRN) 10 tablet 3     triamcinolone (KENALOG) 0.1 % external cream Apply topically 2 times daily to bothersome skin areas. 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     Thyroid disease     removed pituitary gland     Past Surgical History:   Procedure Laterality Date     COLONOSCOPY N/A 12/17/2020    Procedure: COLONOSCOPY;  Surgeon: Ken Camacho MD;  Location: [facility]     ENT SURGERY       HERNIA REPAIR       INSERT PORT VASCULAR ACCESS Right 1/28/2022    Procedure:  INSERTION, VASCULAR ACCESS PORT;  Surgeon: Daniel Kinney MD;  Location: [facility] OR     IR CHEST PORT PLACEMENT > 5 YRS OF AGE  1/28/2022     LARYNGOSCOPY, EXCISE VOCAL CORD LESION MICROSCOPIC, COMBINED Left 07/01/2021    Procedure: MICROLARYNGOSCOPY, LEFT TRUE VOCAL CORD INJECTION WITH PROLARYN;  Surgeon: Kyree Bearden MD;  Location: [facility]     PHACOEMULSIFICATION WITH STANDARD INTRAOCULAR LENS IMPLANT Right 03/10/2021    Procedure: Cataract removal with implant.;  Surgeon: Jamir Mac MD;  Location: [facility]     PHACOEMULSIFICATION WITH STANDARD INTRAOCULAR LENS IMPLANT Left 04/05/2021    Procedure: Cataract removal with implant.;  Surgeon: Jamir Mac MD;  Location: [facility]     PICC DOUBLE LUMEN PLACEMENT Right 12/01/2021    5FR DL PICC, basilic vein. L-38cm, 1cm out.     PICC DOUBLE LUMEN PLACEMENT Right 01/04/2022    Right cephalic, 41 cm, 1 external length     PITUITARY EXCISION       tooth pulled 4/7  Right      Allergies   Allergen Reactions     Penicillins Hives and Swelling            Social History   Social History     Tobacco Use     Smoking status: Never     Smokeless tobacco: Never   Substance Use Topics     Alcohol use: Yes     Comment: rare     Drug use: Never      Past medical history and social history were reviewed.    Physical Examination:  There were no vitals taken for this visit.  Wt Readings from Last 10 Encounters:   10/06/22 65 kg (143 lb 3.2 oz)   09/27/22 65.3 kg (144 lb)   09/18/22 61.2 kg (135 lb)   09/15/22 63.1 kg (139 lb 3.2 oz)   09/08/22 63.2 kg (139 lb 6.4 oz)   08/25/22 62.3 kg (137 lb 6.4 oz)   08/10/22 63.4 kg (139 lb 11.2 oz)   07/12/22 60.4 kg (133 lb 3.2 oz)   06/27/22 60.3 kg (133 lb)   06/09/22 62.1 kg (137 lb)     .Video physical exam  General: Patient appears well in no acute distress.   Skin: No visualized rash or lesions on visualized skin  Eyes: EOMI, no erythema, sclera icterus or discharge noted  Resp: Appears to be breathing comfortably  without accessory muscle usage, speaking in full sentences, no cough  MSK: Appears to have normal range of motion based on visualized movements  Neurologic: No apparent tremors, facial movements symmetric  Psych: affect normal, alert and oriented    Laboratory Data:   Latest Reference Range & Units 10/19/22 08:02   Sodium 136 - 145 mmol/L 139   Potassium 3.4 - 5.3 mmol/L 3.3 (L)   Chloride 98 - 107 mmol/L 103   Carbon Dioxide (CO2) 22 - 29 mmol/L 26   Urea Nitrogen 8.0 - 23.0 mg/dL 17.6   Creatinine 0.67 - 1.17 mg/dL 1.20 (H)   GFR Estimate >60 mL/min/1.73m2 63   Calcium 8.8 - 10.2 mg/dL 8.9   Anion Gap 7 - 15 mmol/L 10   Albumin 3.5 - 5.2 g/dL 3.2 (L)   Protein Total 6.4 - 8.3 g/dL 5.8 (L)   Alkaline Phosphatase 40 - 129 U/L 208 (H)   ALT 10 - 50 U/L 31   AST 10 - 50 U/L 30   Bilirubin Total <=1.2 mg/dL 0.2   Glucose 70 - 99 mg/dL 137 (H)   WBC 4.0 - 11.0 10e3/uL 9.8   Hemoglobin 13.3 - 17.7 g/dL 8.2 (L)   Hematocrit 40.0 - 53.0 % 25.8 (L)   Platelet Count 150 - 450 10e3/uL 114 (L)   RBC Count 4.40 - 5.90 10e6/uL 2.75 (L)   MCV 78 - 100 fL 94   MCH 26.5 - 33.0 pg 29.8   MCHC 31.5 - 36.5 g/dL 31.8   RDW 10.0 - 15.0 % 18.6 (H)   % Neutrophils % 75   % Lymphocytes % 13   % Monocytes % 8   % Eosinophils % 1   % Basophils % 0   Absolute Basophils 0.0 - 0.2 10e3/uL 0.0   Absolute Eosinophils 0.0 - 0.7 10e3/uL 0.1   Absolute Immature Granulocytes <=0.4 10e3/uL 0.3   Absolute Lymphocytes 0.8 - 5.3 10e3/uL 1.3   Absolute Monocytes 0.0 - 1.3 10e3/uL 0.8   % Immature Granulocytes % 3   Absolute Neutrophils 1.6 - 8.3 10e3/uL 7.3   Absolute NRBCs 10e3/uL 0.0   NRBCs per 100 WBC <1 /100 0   (L): Data is abnormally low  (H): Data is abnormally high    CT CAP 10/17/22  FINDINGS:   LUNGS AND PLEURA: 0.2 cm nodule anterolateral left lower lung lobe is  stable. Hypoattenuating nodule in the medial left upper lung lobe  adjacent to the superior mediastinum measures 3.2 x 2.7 x 3.7 cm cm in  the AP, transverse and craniocaudal  dimensions respectively, slightly  qualitatively increased in size since the prior study dated 8/8/2022  where it measured 3.3 x 2.3 x 3.8 cm. No other significant pulmonary  nodule or mass is identified. No infiltrate, effusion, or  pneumothorax. There is mild pleural thickening along the posterior  left hemithorax (images 70-95 series 2). This was not definitely seen  on the prior CT dated 8/8/2022 and is of uncertain clinical  significance.     MEDIASTINUM/AXILLAE: Heart is normal in size. There are moderate  coronary artery calcifications. Right chest wall [id number] with  internal jugular central venous catheter are in stable and  satisfactory positions. No mediastinal, hilar, or axillary  lymphadenopathy is identified. Visualized portions of the thyroid are  unremarkable.     HEPATOBILIARY: Low-attenuation subcentimeter liver lesion(s)  compatible with benign cysts or other benign lesions. No specific  evaluation or follow-up is recommended in a low risk patient.  Gallbladder is decompressed but otherwise unremarkable. Liver  otherwise enhances normally. No biliary ductal dilatation,  cholelithiasis or choledocholithiasis is seen.     PANCREAS: Normal.     SPLEEN: There is an irregular mass posterior aspect of the spleen  extending to the posterior hemidiaphragm. This has qualitatively and  quantitatively increased in size since the prior study dated 8/8/2022  and now measures approximately 7.7 x 8.1 x 6.0 cm in the transverse,  AP, and craniocaudal dimensions respectively. This previously measured  6.6 x 8.0 by 5.0 cm.     ADRENAL GLANDS: Normal.     KIDNEYS/BLADDER: Stable hypoattenuating lesions in the kidneys likely  represent cysts. Largest measures up to 1.0 cm in the anterior left  renal cortex. No hydronephrosis, nephrolithiasis, hydroureter, or  ureteral calculus is identified. Kidneys and urinary bladder and  ureters are otherwise normal in appearance.     BOWEL: There are a few scattered colonic  diverticula. There is a  moderate amount of stool in the colon. The colon is otherwise grossly  of normal appearance and demonstrates no pericolonic inflammatory  change to suggest diverticulitis or colitis. There is a new  appendicolith in the proximal appendix measuring up to 0.5 x 0.4 cm.  The appendix is otherwise normal in appearance and demonstrates no  adjacent inflammatory change to suggest acute appendicitis. Small  bowel is of normal caliber and appearance. Stomach contains a moderate  to large amount ingested material and air, but is otherwise  unremarkable.     PELVIC ORGANS: Prostate gland is mildly enlarged but otherwise  unremarkable. Seminal vesicles are unremarkable.     ADDITIONAL FINDINGS: There is nonaneurysmal atherosclerosis. No  adenopathy, free fluid, or free air is seen in the peritoneal cavity.     MUSCULOSKELETAL: There are degenerative changes in the spine. No  aggressive osseous lesions or acute osseous fractures are seen.                                                                      IMPRESSION:  1.  Slightly enlarging mass along the posterior superior spleen,  possibly slightly increased hypoattenuating nodular mass in the  anterior left superior mediastinum adjacent to the medial left upper  lung lobe and new posterior left hemithorax pleural thickening are  concerning for subtle progression of patient's sarcoma and metastases.  2.  Atherosclerosis.  3.  Probable renal and hepatic cysts.  4.  Colonic diverticulosis.  5.  No other evidence for metastasis is seen.     ROBERT MONTGOMERY MD     Assessment and Plan:  1. 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 High PD-L1. Was on Keytruda but had progression, started Doxil + Ifos started 10/26/21. Completed 4 cycles with positive response but had significant toxicities (nausea/vomiting, dehydration, neurotoxicity, mucositis, skin toxicity, pancytopenia, hypokalemia, hypophosphatemia). Was  on Doxil alone March 2022-July 2022. Due to disease progression switched to Gemzar 8/10/22.    He has not tolerated Gemzar well-has had fatigue, weakness, cytopenias, edema, skin irritation, mucositis. Reviewed CT from 10/17 and there is concern for slight progression.    Reviewed with Dr. Wolff. Will do treatment break the next couple weeks to ensure improvement for chemo toxicities and then repeat CT. Pending degree of progression likely will switch to different treatment given minimal response to Gemzar and side effects. Will request insurance approval for pazopanib.      2. Mucositis  Recurrent, ongoing with all treatment we have done. Refractory to multiple interventions. Now finally improved with Doxepin. Continue.      3. Edema  2/2 gemcitabine. ED work-up negative for infection. US negative for DVT. Continue compression stockings. Discussed lasix but he prefers to wait [place]. Erythema improving.      4. GI  Dyspepsia: Continue Omeprazole 40mg daily. Continue Tums PRN     CINV: Zofran PRN.      5. 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 Tao. Stable.     Hypothyroidism continue Synthroid 75mcg daily, endo following      6. Derm  Rosacea: Long standing issue, continue Metrogel PRN     Hand/foot: 2/2 Doxil, improved. No issues now that he is off Doxil. Lotion to legs.     7. MSK  Left shoulder/back/chest wall pain 2/2 tumor, following with palliative. Minimal pain currently, off all pain medications.  Tylenol PRN.     8. ENT  Hoarseness: Thought 2/2 mediastinal mass vs irritation from prior biopsy. Following with ENT, s/p vocal cord injection 7/1/21. Following with voice clinic.      9. Pulm/Cards  Working with speech on laryngreal dysfunction.     SOB improved. CT stable. Echo stable.      Continue Guaifenesin-Coedine PRN for cough, much improved.       Off statin due to LFT elevation, improved.      10. FEN  Hypokalemia: Recurrent issue,  continue 20meq daily.      Hypophosphatemia: Continue 250mg phos BID for recurrent issues.      Renal insufficiency: Doing better, still getting fluids PRN.      Previously referred to medical cannabis program for poor appetite related to malignancy/treatment.      11. Psych/Neuro  Anxiety/depression: Improved, on Lexapro and feels like it is helping. Continue.      Continue PRN Imitrex for migraines.      10. Heme  Pancytopenia 2/2 chemotherapy. Did require pRBC last week. Transfuse if hgb <8.    35 minutes spent on the date of the encounter doing chart review, review of test results, interpretation of tests, patient visit, documentation and discussion with other provider(s)     Maynor Rios PA-C  Department of Hematology and Oncology  [facility]

## 2022-10-26 NOTE — TELEPHONE ENCOUNTER
Prior Authorization Approval    Authorization Effective Date: 10/26/2022  Authorization Expiration Date: 4/24/2023  Medication: Votrient 200mg PA Approved  Approved Dose/Quantity: 120/30  Reference #: [id number]   Insurance Company: [employer] - Phone [phone] Fax [phone]  Expected CoPay: $2819.88     CoPay Card Available: No    Foundation Assistance Needed: NO  Which Pharmacy is filling the prescription (Not needed for infusion/clinic administered):    Pharmacy Notified: No  Patient Notified: No        Jessie Naidu CPhT  [facility]  Oncology Pharmacy Liaison   [email or web address]  [phone] (phone  [phone] (fax

## 2022-10-26 NOTE — TELEPHONE ENCOUNTER
PA Initiation    Medication: Votrient 200mg PA Pending  Insurance Company: [employer] - Phone [phone] Fax [phone]  Pharmacy Filling the Rx:    Filling Pharmacy Phone:    Filling Pharmacy Fax:    Start Date: 10/26/2022      Jessie Naidu CPhT  [facility]  Oncology Pharmacy [email or web address]  [phone] (phone  [phone] (fax

## 2022-10-27 NOTE — PROGRESS NOTES
Infusion Nursing Note:  Ifrah Huitron presents today for IVF.    Patient seen by provider today: No   present during visit today: Not Applicable.    Note: N/A.    Intravenous Access:  Implanted Port.    Treatment Conditions:  Lab Results   Component Value Date    HGB 8.2 (L) 10/27/2022    WBC 12.4 (H) 10/27/2022    ANEU 7.4 10/06/2022    ANEUTAUTO 8.4 (H) 10/27/2022     10/27/2022      Lab Results   Component Value Date     10/27/2022    POTASSIUM 3.5 10/27/2022    MAG 2.0 10/27/2022    CR 1.14 10/27/2022    COTY 9.0 10/27/2022    BILITOTAL <0.2 10/27/2022    ALBUMIN 3.4 (L) 10/27/2022    ALT 23 10/27/2022    AST 35 10/27/2022     Post Infusion Assessment:  Patient tolerated infusion without incident.  Blood return noted pre and post infusion.  Site patent and intact, free from redness, edema or discomfort.  No evidence of extravasations.  Access discontinued per protocol.     Discharge Plan:   Discharge instructions reviewed with: Patient.  Patient and/or family verbalized understanding of discharge instructions and all questions answered.  AVS to patient via [employer].  Patient will return 11/8/2022 for video visit with Dr. Wolff for next appointment.   Patient discharged in stable condition accompanied by: self.  Departure Mode: Ambulatory.    Erin Lowe RN

## 2022-11-07 NOTE — PROGRESS NOTES
Ifrah is a 72 year old who is being evaluated via a billable video visit.      How would you like to obtain your AVS? [employer]  If the video visit is dropped, the invitation should be resent by: Text to cell phone: [phone]  Will anyone else be joining your video visit? Emma Farley       Video-Visit Details    Video Start Time:about 752    Type of service:  Video Visit    Video End Timabout 816    Originating Location (pt. Location)home    prov offsite    [facility]          11-8-22     I saw Ifrah Huitron for f/u of a a tumor metastatic to the lung.       Background  His history is somewhat complicated but in brief he began developing some low left back pain about the fall winter 2109-2004.  This is gradually increased in size and he has become more tired; this led to imaging showing several lung nodules and a biopsy showing a malignant tumor.   -  biopsy showed a malignant tumor without a specific other than the descriptive diagnosis.  Notably it is a strong PD-L1 expresser.    Specimen #: [id number]   Collected: 5/20/2021   FINAL DIAGNOSIS:   Left pleural mass biopsy, CT guided:   - Malignant spindle cell neoplasm with necrosis (see comment)     COMMENT:   Special stains were performed with appropriate controls.  The tumor cells   are diffusely positive for CK   AE1/AE3 and CK MIRZA.  There is also focal to patchy staining for D2-40,   CD68, and WT-1.  INI1 is retained in   the tumor cells.  The Ki-67 labeling index is approximately 70%.  The   tumor cells show high PD-L1 expression   (TPS 90%).  No expression of P63, calretinin, DOG1, ALK1, , CK 5/6,   STAT6, SMA, SALL4, desmin, S100,   Myogenin, CD21, and CD23 is identified in the tumor cells.  These findings    together with tumor morphology and   location are most compatible with malignant sarcomatoid mesothelioma.  The    differential diagnosis includes   sarcomatoid carcinoma and sarcomas such as synovial sarcoma and    histiocytic sarcoma.  Additional tests are   pending and may help to classify this tumor.   -  Baseline imaging 6/21/21 with progression of lung mets and left-sided subdiaphragmatic mass, stable liver lesions. 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   -        Interval history     He started keytruda 6-21-21.     CT 8/2/21 showed a positive response but CT 10/18/21 showed mixed response- LUQ mass larger, anterior mediastinal and left anterior pleural mass smaller. Keytruda stopped.     The pain went away initially w keytruda but recurred.     He started Doxil + Ifosfamide 10-26-21 but had a lot of toxicity. CT showed stable to positive response after 1 cycle.     CT 3-8-22 positive response.      Due to significant toxicity ifos was reduced and he went to doxil alone on 3-10-22    Due to progression he started gemzar 8-10-22.    He had a lot of problems with the Gemzar including a lot of leg swelling.  He had some delays with that.  Interestingly he overall feels better than he did around Labor Day due to the improvement of mucositis which appears to correlate with use of doxepin.  He still has some loss of taste.  There is still some swelling of the ankles but the leg is much better.  He has been working in the garage and playing golf twice a week and has not really noted much in the way of limitation of activities.  He goes up to floors with with no problem.  He has noted increasing pain in the left low back over the last 1 month.  He notes he used to have shortness of breath more than a year ago when he would was exercising but now he does not notice that.    The last Gemzar was October 6 and he was not really able to walk much for 2 weeks after that.      He had an injection for the vocal cord but it did not really help and he feels he is just has to live with his weight is which is not that bothersome.  His mood is good on  the Lexapro.  He does notice that while he sleeps okay and feels rested when he wakes up after a couple of hours he starts to get tired and takes a few naps during the day.  Initially feels better at 10 PM.  This been going on for a while.  Notably he takes his drugs including Lexapro in the morning when he wakes up.    The nonproductive cough is rare.      He controls GERD with Prilosec daily and prn tums.      Mood is good now.     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    ROS otherwise unremarkable     -  Background PMH, FH, SH  His past medical history is notable for hypophyasectomy in 2010 and he is on replacement T4, cortisol.  He takes minocycline daily for rosacea and feels that as needed for GERD he has had a kidney stone in the past and has some DJD, and a hernia repair.  He describes an allergy to penicillin manifest as hives.  Family history is not particularly remarkable although his mother had lupus and his father had ALS and his sister has rheumatoid arthritis.  Social history reveals he is  with 5 adult children, is a never smoker and does not abuse alcohol or other drugs.  He works as a  and has several hobbies building a garage.  -     On exam he appeared comfortable with normal affect.  HEENT full EOMs, ptosis R-this is longstanding and unclear etiology.  NECK no obvious goiter or mass  CHEST no respiratory distress  NEURO normal mentation and speech   PSYCH Good mood     -  On 4-7 creat 1.27, LFT OK, alb 3.6, Hb 10.6, plt 211    On 10-27 CBC OK w Hb 8.2, GNE OK w creat 1.14 though glu 124, LFT OK, alb 3.4    -  I reviewed his images of 11-7-22 and earlier images and I dont think we can find clear evidence of response to the gemzar.    Formal read:  CT-CAP 11-7-22  IMPRESSION:  1.  Slight interval enlargement of previously seen anterior  mediastinal/anteromedial left upper lobe and splenic masses.  2.  Multiple nonacute findings as  above.    -  We discussed the situation; there is the lack of a truly specific diagnosis.     He got an initial response to Keytruda but then had progression and he began ifosfamide and Doxil.  There was some response but due to toxicity he started Doxil alone in March 2022.    Due to progression he started gemzar 8-10-22.    There is no clear benefit of the gemzar and he had a lot of toxicity.    We we discussed that we could use other agents such as trabectedin or pazopanib among others and we went over the typical toxicities of those.. We might consider the potential role of XRT in this case though it is sizable.  He had no real preference and we decided to go with trabectedin first.  This left to be done as an inpatient due to the fact that he has Medicare.    Pain control via palliative if necessary though he has some gradual increase in the pain over the last month but its not that bad and generally does not interfere with sleep.       1a late morning and afternoon fatigue and tiredness  I asked him to change his Lexapro to the evening for a few days to see if it makes a difference.  He notes his wife thinks his snoring is not that bad and better than it used to be and he feels he sleeps okay at night.    2. Mucositis  Now  improved with Doxepin. Continue.      3. Edema  2/2 gemcitabine. ED work-up negative for infection. US negative for DVT. Continue compression stockings.  This is improving but he still has some ankle edema      4. GI  Dyspepsia: Continue Omeprazole 40mg daily. Continue Tums PRN     CINV: Zofran PRN.      5. 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 Tao. Stable.     Hypothyroidism continue Synthroid 75mcg daily, endo following      6. Derm  Rosacea: Long standing issue, continue Metrogel PRN     Hand/foot: 2/2 Doxil, improved. No issues now that he is off Doxil. Lotion to legs.     7. MSK  Left shoulder/back/chest wall pain 2/2 tumor, following with  palliative. Minimal pain currently, off all pain medications.  Tylenol PRN.  This is however gradually increasing compared with growth of the tumor.     8. ENT  Hoarseness: Thought 2/2 mediastinal mass vs irritation from prior biopsy. Following with ENT, s/p vocal cord injection 7/1/21. Following with voice clinic.   Working with speech on laryngreal dysfunction.      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  The diagnoses of DJD, history of kidney stones and penicillin allergy are noted.      All their question addressed and will call if others arise.     Luke Wolff M.D.  Professor  Hematology, Oncology and Transplantation

## 2022-11-08 NOTE — LETTER
11/8/2022         RE: Ifrah Huitron  [id number] [place] [address]        Dear Colleague,    Thank you for referring your patient, Ifrah Huitron, to the [facility]. Please see a copy of my visit note below.    Ifrah is a 72 year old who is being evaluated via a billable video visit.      How would you like to obtain your AVS? MyChart  If the video visit is dropped, the invitation should be resent by: Text to cell phone: [phone]  Will anyone else be joining your video visit? Emma Farley       Video-Visit Details    Video Start Time:about 752    Type of service:  Video Visit    Video End Timabout 816    Originating Location (pt. Location)home    prov offsite    [facility]          11-8-22     I saw Ifrah Huitron for f/u of a a tumor metastatic to the lung.       Background  His history is somewhat complicated but in brief he began developing some low left back pain about the fall winter [id number].  This is gradually increased in size and he has become more tired; this led to imaging showing several lung nodules and a biopsy showing a malignant tumor.   -  biopsy showed a malignant tumor without a specific other than the descriptive diagnosis.  Notably it is a strong PD-L1 expresser.    Specimen #: [id number]   Collected: 5/20/2021   FINAL DIAGNOSIS:   Left pleural mass biopsy, CT guided:   - Malignant spindle cell neoplasm with necrosis (see comment)     COMMENT:   Special stains were performed with appropriate controls.  The tumor cells   are diffusely positive for CK   AE1/AE3 and CK MIRZA.  There is also focal to patchy staining for D2-40,   CD68, and WT-1.  INI1 is retained in   the tumor cells.  The Ki-67 labeling index is approximately 70%.  The   tumor cells show high PD-L1 expression   (TPS 90%).  No expression of P63, calretinin, DOG1, ALK1, , CK 5/6,   STAT6, SMA, SALL4, desmin, S100,   Myogenin, CD21, and CD23 is identified in the  tumor cells.  These findings    together with tumor morphology and   location are most compatible with malignant sarcomatoid mesothelioma.  The    differential diagnosis includes   sarcomatoid carcinoma and sarcomas such as synovial sarcoma and   histiocytic sarcoma.  Additional tests are   pending and may help to classify this tumor.   -  Baseline imaging 6/21/21 with progression of lung mets and left-sided subdiaphragmatic mass, stable liver lesions. 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   -        Interval history     He started keytruda 6-21-21.     CT 8/2/21 showed a positive response but CT 10/18/21 showed mixed response- LUQ mass larger, anterior mediastinal and left anterior pleural mass smaller. Keytruda stopped.     The pain went away initially w keytruda but recurred.     He started Doxil + Ifosfamide 10-26-21 but had a lot of toxicity. CT showed stable to positive response after 1 cycle.     CT 3-8-22 positive response.      Due to significant toxicity ifos was reduced and he went to doxil alone on 3-10-22    Due to progression he started gemzar 8-10-22.    He had a lot of problems with the Gemzar including a lot of leg swelling.  He had some delays with that.  Interestingly he overall feels better than he did around Labor Day due to the improvement of mucositis which appears to correlate with use of doxepin.  He still has some loss of taste.  There is still some swelling of the ankles but the leg is much better.  He has been working in the [employer] and playing golf twice a week and has not really noted much in the way of limitation of activities.  He goes up to floors with with no problem.  He has noted increasing pain in the left low back over the last 1 month.  He notes he used to have shortness of breath more than a year ago when he would was exercising but now he does not notice that.    The last Gemzar was  October 6 and he was not really able to walk much for 2 weeks after that.      He had an injection for the vocal cord but it did not really help and he feels he is just has to live with his weight is which is not that bothersome.  His mood is good on the Lexapro.  He does notice that while he sleeps okay and feels rested when he wakes up after a couple of hours he starts to get tired and takes a few naps during the day.  Initially feels better at 10 PM.  This been going on for a while.  Notably he takes his drugs including Lexapro in the morning when he wakes up.    The nonproductive cough is rare.      He controls GERD with Prilosec daily and prn tums.      Mood is good now.     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    ROS otherwise unremarkable     -  Background PMH, FH, SH  His past medical history is notable for hypophyasectomy in 2010 and he is on replacement T4, cortisol.  He takes minocycline daily for rosacea and feels that as needed for GERD he has had a kidney stone in the past and has some DJD, and a hernia repair.  He describes an allergy to penicillin manifest as hives.  Family history is not particularly remarkable although his mother had lupus and his father had ALS and his sister has rheumatoid arthritis.  Social history reveals he is  with 5 adult children, is a never smoker and does not abuse alcohol or other drugs.  He works as a  and has several hobbies building a garage.  -     On exam he appeared comfortable with normal affect.  HEENT full EOMs, ptosis R-this is longstanding and unclear etiology.  NECK no obvious goiter or mass  CHEST no respiratory distress  NEURO normal mentation and speech   PSYCH Good mood     -  On 4-7 creat 1.27, LFT OK, alb 3.6, Hb 10.6, plt 211    On 10-27 CBC OK w Hb 8.2, GNE OK w creat 1.14 though glu 124, LFT OK, alb 3.4    -  I reviewed his images of 11-7-22 and earlier images and I dont think we can  find clear evidence of response to the gemzar.    Formal read:  CT-CAP 11-7-22  IMPRESSION:  1.  Slight interval enlargement of previously seen anterior  mediastinal/anteromedial left upper lobe and splenic masses.  2.  Multiple nonacute findings as above.    -  We discussed the situation; there is the lack of a truly specific diagnosis.     He got an initial response to Keytruda but then had progression and he began ifosfamide and Doxil.  There was some response but due to toxicity he started Doxil alone in March 2022.    Due to progression he started gemzar 8-10-22.    There is no clear benefit of the gemzar and he had a lot of toxicity.    We we discussed that we could use other agents such as trabectedin or pazopanib among others and we went over the typical toxicities of those.. We might consider the potential role of XRT in this case though it is sizable.  He had no real preference and we decided to go with trabectedin first.  This left to be done as an inpatient due to the fact that he has Medicare.    Pain control via palliative if necessary though he has some gradual increase in the pain over the last month but its not that bad and generally does not interfere with sleep.       1a late morning and afternoon fatigue and tiredness  I asked him to change his Lexapro to the evening for a few days to see if it makes a difference.  He notes his wife thinks his snoring is not that bad and better than it used to be and he feels he sleeps okay at night.    2. Mucositis  Now  improved with Doxepin. Continue.      3. Edema  2/2 gemcitabine. ED work-up negative for infection. US negative for DVT. Continue compression stockings.  This is improving but he still has some ankle edema      4. GI  Dyspepsia: Continue Omeprazole 40mg daily. Continue Tums PRN     CINV: Zofran PRN.      5. 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 Tao. Stable.     Hypothyroidism continue Synthroid 75mcg daily,  endo following      6. Derm  Rosacea: Long standing issue, continue Metrogel PRN     Hand/foot: 2/2 Doxil, improved. No issues now that he is off Doxil. Lotion to legs.     7. MSK  Left shoulder/back/chest wall pain 2/2 tumor, following with palliative. Minimal pain currently, off all pain medications.  Tylenol PRN.  This is however gradually increasing compared with growth of the tumor.     8. ENT  Hoarseness: Thought 2/2 mediastinal mass vs irritation from prior biopsy. Following with ENT, s/p vocal cord injection 7/1/21. Following with voice clinic.   Working with speech on laryngreal dysfunction.      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  The diagnoses of DJD, history of kidney stones and penicillin allergy are noted.      All their question addressed and will call if others arise.         Again, thank you for allowing me to participate in the care of your patient.      Sincerely,    Luke Wolff MD

## 2022-11-14 NOTE — NURSING NOTE
Patient denies any changes since echeck-in regarding medication and allergies and states all information entered during echeck-in remains accurate.    Austin RODRIGUEZ

## 2022-11-14 NOTE — PROGRESS NOTES
Ifrah is a 72 year old who is being evaluated via a billable video visit.      How would you like to obtain your AVS? MyChart  If the video visit is dropped, the invitation should be resent by: Text to cell phone: [phone]    Bradenmily Dwayne RODRIGUEZ    Video-Visit Details    Video Start Time: 1:18 PM    Type of service:  Video Visit    Video End Time:1:42 PM    Originating Location (pt. Location): Home    Distant Location (provider location):  Off-site    Platform used for Video Visit: [facility]     Oncology/Hematology Visit Note  Nov 14, 2022    Reason for Visit: Follow up of spindle cell sarcoma     History of Present Illness: Ifrah Huitron is a 72 year old male with PMH rosacea, pituitary macroadenoma s/p resection, GERD, kidney stones, DJD with metastatic spindle cell sarcoma. He presented to his PCP March 2021 with chest pain/left shoulder and back pain that led to imaging which revealed multiple lung mets. There were difficulties in getting diagnostic biopsy, eventually biopsy of mediastinal mass with spindle cell sarcoma. There was high PD-L1 expression (90%). Baseline imaging 6/21/21 with progression of lung mets and left-sided subdiaphragmatic mass, stable liver lesions. 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      He started Keytruda 6/21/21. CT 8/2/21 with positive response to treatment. CT 10/18/21 with mixed response- LUQ mass larger, anterior mediastinal and left anterior pleural mass smaller. Keytruda stopped.     Started on Doxil + Ifosfamide 10/26/21. Poorly tolerated with mucositis, nausea, poor oral intake. CT with stable to positive response.      Received C2 12/1/21 (delayed for tolerance issues) with 10% dose reduction.     Received C3 1/4/22 with same 10% dose reduction. He had issues with nausea inpatient along with recurrent thrush.      Received C4 2/2/22 with same 10% dose reduction. Had more significant  neurotoxicity so only received 5.5 days. Symptoms resolved after stopping Ifosfamide.      CT 3/8/22 with positive response to treatment.      He was on Doxil alone but has been dealing with worsening mouth and skin toxicity and has required delays and dose reductions. CT 8/8/22 with disease progression.     Switched to Gemzar 8/10/22-10/6/22    10/17/22 CT CAP shows slightly enlarging mass along the posterior superior spleen, possibly slightly increased hypoattenuating nodular mass in the anterior left superior mediastinum adjacent to the medial left upper lung lobe and new posterior left hemithorax pleural thickening are concerning for subtle progression of patient's sarcoma and metastases.    11/7/22 CT CAP shows slight interval enlargement of previously seen anterior mediastinal/anteromedial left upper lobe and splenic masses.    Interval History:  -Denies any new symptoms since seeing Dr. Wolff.   -Pain in left mid-back is gradually getting worse. Tylenol and Bengay are no longer helping with this pain. Was previously on Celebrex and wonders if he may try that again.   -Doing better with eating and drinking now that thrush and mucositis have resolved. Has regained 10 pounds of his 30 pounds that he lost.   -Leg swelling is still present but improving. No help from compression stockings in the past.   -Heartburn is well controlled with omeprazole.  -Takes Zofran about twice/week for nausea.   -Using metrogel for rosacea, which seems a little worse lately.  -Has some flaking of skin on calves and feet, which he attributes to the swelling. Using lotion on legs and feet.  -Feels fatigued. Naps at least 3 hours per day for the past couple of months.   -Was golfing twice/week until legs got swollen. Unable to work in his garage lately due to fatigue. Was able to go golfing once a couple of weeks ago.     Current Outpatient Medications   Medication Sig Dispense Refill     clotrimazole (MYCELEX) 10 MG lozenge Place 1  "lozenge (10 mg) inside cheek 5 times daily (Patient not taking: No sig reported) 90 lozenge 1     doxepin (SINEQUAN) 10 MG/ML (HIGH CONC) solution Take 2.5 mLs (25 mg) by mouth 2 times daily as needed (rinse for mucositis) Dilute the 2.5 mL of doxepin to 5 ml total in sterile or distilled water. 118 mL 0     escitalopram (LEXAPRO) 10 MG tablet Take 1 tablet (10 mg) by mouth daily 90 tablet 3     guaiFENesin-codeine (GUAIFENESIN AC) 100-10 MG/5ML syrup Take 10 mLs by mouth every 4 hours as needed for cough 118 mL 0     hydrocortisone (CORTEF) 10 MG tablet Take 20 mg in the morning and 10 mg in the afternoon 270 tablet 3     Insulin Syringe-Needle U-100 27.5G X 5/8\" 2 ML MISC 1 each daily as needed (with solucortef for adrenal crisis) 10 each 1     levothyroxine (SYNTHROID/LEVOTHROID) 75 MCG tablet Take 1 tablet (75 mcg) by mouth every morning 90 tablet 3     Menthol-Methyl Salicylate (DALIA MALIK GREASELESS) cream Apply topically 2 times daily       metroNIDAZOLE (METROGEL) 1 % external gel Apply topically daily Try stronger dose due to increased symptoms after chemo. 30 g 0     omeprazole (PRILOSEC) 20 MG DR capsule Take 1 capsule (20 mg) by mouth 2 times daily 60 capsule 1     ondansetron (ZOFRAN) 4 MG tablet Take 1-2 tablets (4-8 mg) by mouth every 8 hours as needed for nausea 60 tablet 3     phosphorus tablet 250 mg (PHOSPHA 250 NEUTRAL) 250 MG per tablet Take 1 tablet (250 mg) by mouth 2 times daily 60 tablet 3     potassium chloride ER (KLOR-CON M) 20 MEQ CR tablet Take 1 tablet (20 mEq) by mouth daily 90 tablet 3     prochlorperazine (COMPAZINE) 5 MG tablet Take 1 tablet (5 mg) by mouth every 6 hours as needed for nausea or vomiting . Caution: causes sedation. 30 tablet 1     SUMAtriptan (IMITREX) 50 MG tablet Take 1 tablet (50 mg) by mouth at onset of headache for migraine May repeat in 2 hours. Max 4 tablets/24 hours. (Patient taking differently: Take 50 mg by mouth at onset of headache for migraine May repeat " in 2 hours. Max 4 tablets/24 hours. PRN) 10 tablet 3     triamcinolone (KENALOG) 0.1 % external cream Apply topically 2 times daily to bothersome skin areas. 30 g 3     Objective:  General: patient appears well in no acute distress, alert and oriented, speech clear and fluid  Skin: no visualized rash or lesions on visualized skin  Resp: Appears to be breathing comfortably without accessory muscle usage, speaking in full sentences, no audible wheezes or cough.  Psych: Coherent speech, normal rate and volume, able to articulate logical thoughts, able to abstract reason, no tangential thoughts, no hallucinations or delusions  Patient's affect is appropriate.    Laboratory Data:  Most Recent 3 CBC's:Recent Labs   Lab Test 10/27/22  1004 10/19/22  0802 10/13/22  0947   WBC 12.4* 9.8 6.2   HGB 8.2* 8.2* 7.7*   MCV 96 94 96    114* 135*   ANEUTAUTO 8.4* 7.3 3.9     Most Recent 3 BMP's:  Recent Labs   Lab Test 10/27/22  1004 10/19/22  0802 10/13/22  0947    139 136   POTASSIUM 3.5 3.3* 3.8   CHLORIDE 107 103 102   CO2 27 26 25   BUN 19.7 17.6 24.5*   CR 1.14 1.20* 1.26*   ANIONGAP 7 10 9   COTY 9.0 8.9 8.6*   * 137* 162*   PROTTOTAL 6.2* 5.8* 6.6   ALBUMIN 3.4* 3.2* 3.4*    Most Recent 3 LFT's:  Recent Labs   Lab Test 10/27/22  1004 10/19/22  0802 10/13/22  0947   AST 35 30 28   ALT 23 31 52*   ALKPHOS 175* 208* 256*   BILITOTAL <0.2 0.2 0.4   I reviewed the above labs today.    Assessment and Plan:  1. 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 Now starting on Yondelis due to lack of response to Gemzar and significant toxicities. We reviewed side effects and their management in detail today. He will be admitted tomorrow to start treatment. He will see Maynor Rios PA-C on 12/5 prior to cycle 2.     2. Mucositis  Recurrent, ongoing with all treatment we have done. Refractory to multiple interventions. Now finally improved with Doxepin. Continue.      3.  Edema  2/2 gemcitabine. ED work-up negative for infection. US negative for DVT. Previously, discussed lasix but he prefers to hold off. Could consider lymphedema wraps if worsens again.     4. GI  Dyspepsia: Continue Omeprazole 40mg daily. Continue Tums PRN     CINV: Zofran PRN.      5. 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 Tao. Stable.     Hypothyroidism continue Synthroid 75mcg daily, endo following      6. Derm  Rosacea: Long standing issue, continue Metrogel PRN     7. MSK  Left shoulder/back/chest wall pain 2/2 tumor, following with palliative. Pain has worsened again. Will try resuming Celebrex 200 mg bid      8. ENT  Hoarseness: Thought 2/2 mediastinal mass vs irritation from prior biopsy. Following with ENT, s/p vocal cord injection 7/1/21. Following with voice clinic.      9. FEN  Hypokalemia: Recurrent issue, continue 20meq daily.      Hypophosphatemia: Continue 250mg phos BID for recurrent issues.      10. Psych/Neuro  Anxiety/depression: Improved, on Lexapro and feels like it is helping. Continue.      Fatigue: Encouraged daily exercise. He would like to hold off on a trial of Ritalin due to the potential for worsening anorexia.     Continue PRN Imitrex for migraines.      Demetra Nicole PA-C  [facility]  [address], [place] [address] [phone]    60 minutes spent on the date of the encounter doing chart review, review of test results, interpretation of tests, patient visit and documentation

## 2022-11-14 NOTE — LETTER
11/14/2022         RE: Ifrah Huitron  [address] [facility] [address]        Dear Colleague,    Thank you for referring your patient, Ifrah Huitron, to the [facility]. Please see a copy of my visit note below.    Ifrah is a 72 year old who is being evaluated via a billable video visit.      How would you like to obtain your AVS? MyChart  If the video visit is dropped, the invitation should be resent by: Text to cell phone: [phone]    Austin RODRIGUEZ    Video-Visit Details    Video Start Time: 1:18 PM    Type of service:  Video Visit    Video End Time:1:42 PM    Originating Location (pt. Location): Home    Distant Location (provider location):  Off-site    Platform used for Video Visit: [facility]     Oncology/Hematology Visit Note  Nov 14, 2022    Reason for Visit: Follow up of spindle cell sarcoma     History of Present Illness: Ifrah Huitron is a 72 year old male with PMH rosacea, pituitary macroadenoma s/p resection, GERD, kidney stones, DJD with metastatic spindle cell sarcoma. He presented to his PCP March 2021 with chest pain/left shoulder and back pain that led to imaging which revealed multiple lung mets. There were difficulties in getting diagnostic biopsy, eventually biopsy of mediastinal mass with spindle cell sarcoma. There was high PD-L1 expression (90%). Baseline imaging 6/21/21 with progression of lung mets and left-sided subdiaphragmatic mass, stable liver lesions. 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      He started Keytruda 6/21/21. CT 8/2/21 with positive response to treatment. CT 10/18/21 with mixed response- LUQ mass larger, anterior mediastinal and left anterior pleural mass smaller. Keytruda stopped.     Started on Doxil + Ifosfamide 10/26/21. Poorly tolerated with mucositis, nausea, poor oral intake. CT with stable to positive response.      Received C2 12/1/21  (delayed for tolerance issues) with 10% dose reduction.     Received C3 1/4/22 with same 10% dose reduction. He had issues with nausea inpatient along with recurrent thrush.      Received C4 2/2/22 with same 10% dose reduction. Had more significant neurotoxicity so only received 5.5 days. Symptoms resolved after stopping Ifosfamide.      CT 3/8/22 with positive response to treatment.      He was on Doxil alone but has been dealing with worsening mouth and skin toxicity and has required delays and dose reductions. CT 8/8/22 with disease progression.     Switched to Gemzar 8/10/22-10/6/22    10/17/22 CT CAP shows slightly enlarging mass along the posterior superior spleen, possibly slightly increased hypoattenuating nodular mass in the anterior left superior mediastinum adjacent to the medial left upper lung lobe and new posterior left hemithorax pleural thickening are concerning for subtle progression of patient's sarcoma and metastases.    11/7/22 CT CAP shows slight interval enlargement of previously seen anterior mediastinal/anteromedial left upper lobe and splenic masses.    Interval History:  -Denies any new symptoms since seeing Dr. Wolff.   -Pain in left mid-back is gradually getting worse. Tylenol and Bengay are no longer helping with this pain. Was previously on Celebrex and wonders if he may try that again.   -Doing better with eating and drinking now that thrush and mucositis have resolved. Has regained 10 pounds of his 30 pounds that he lost.   -Leg swelling is still present but improving. No help from compression stockings in the past.   -Heartburn is well controlled with omeprazole.  -Takes Zofran about twice/week for nausea.   -Using metrogel for rosacea, which seems a little worse lately.  -Has some flaking of skin on calves and feet, which he attributes to the swelling. Using lotion on legs and feet.  -Feels fatigued. Naps at least 3 hours per day for the past couple of months.   -Was golfing  "twice/week until legs got swollen. Unable to work in his garage lately due to fatigue. Was able to go golfing once a couple of weeks ago.     Current Outpatient Medications   Medication Sig Dispense Refill     clotrimazole (MYCELEX) 10 MG lozenge Place 1 lozenge (10 mg) inside cheek 5 times daily (Patient not taking: No sig reported) 90 lozenge 1     doxepin (SINEQUAN) 10 MG/ML (HIGH CONC) solution Take 2.5 mLs (25 mg) by mouth 2 times daily as needed (rinse for mucositis) Dilute the 2.5 mL of doxepin to 5 ml total in sterile or distilled water. 118 mL 0     escitalopram (LEXAPRO) 10 MG tablet Take 1 tablet (10 mg) by mouth daily 90 tablet 3     guaiFENesin-codeine (GUAIFENESIN AC) 100-10 MG/5ML syrup Take 10 mLs by mouth every 4 hours as needed for cough 118 mL 0     hydrocortisone (CORTEF) 10 MG tablet Take 20 mg in the morning and 10 mg in the afternoon 270 tablet 3     Insulin Syringe-Needle U-100 27.5G X 5/8\" 2 ML MISC 1 each daily as needed (with solucortef for adrenal crisis) 10 each 1     levothyroxine (SYNTHROID/LEVOTHROID) 75 MCG tablet Take 1 tablet (75 mcg) by mouth every morning 90 tablet 3     Menthol-Methyl Salicylate (DALIA MALIK GREASELESS) cream Apply topically 2 times daily       metroNIDAZOLE (METROGEL) 1 % external gel Apply topically daily Try stronger dose due to increased symptoms after chemo. 30 g 0     omeprazole (PRILOSEC) 20 MG DR capsule Take 1 capsule (20 mg) by mouth 2 times daily 60 capsule 1     ondansetron (ZOFRAN) 4 MG tablet Take 1-2 tablets (4-8 mg) by mouth every 8 hours as needed for nausea 60 tablet 3     phosphorus tablet 250 mg (PHOSPHA 250 NEUTRAL) 250 MG per tablet Take 1 tablet (250 mg) by mouth 2 times daily 60 tablet 3     potassium chloride ER (KLOR-CON M) 20 MEQ CR tablet Take 1 tablet (20 mEq) by mouth daily 90 tablet 3     prochlorperazine (COMPAZINE) 5 MG tablet Take 1 tablet (5 mg) by mouth every 6 hours as needed for nausea or vomiting . Caution: causes sedation. 30 " tablet 1     SUMAtriptan (IMITREX) 50 MG tablet Take 1 tablet (50 mg) by mouth at onset of headache for migraine May repeat in 2 hours. Max 4 tablets/24 hours. (Patient taking differently: Take 50 mg by mouth at onset of headache for migraine May repeat in 2 hours. Max 4 tablets/24 hours. PRN) 10 tablet 3     triamcinolone (KENALOG) 0.1 % external cream Apply topically 2 times daily to bothersome skin areas. 30 g 3     Objective:  General: patient appears well in no acute distress, alert and oriented, speech clear and fluid  Skin: no visualized rash or lesions on visualized skin  Resp: Appears to be breathing comfortably without accessory muscle usage, speaking in full sentences, no audible wheezes or cough.  Psych: Coherent speech, normal rate and volume, able to articulate logical thoughts, able to abstract reason, no tangential thoughts, no hallucinations or delusions  Patient's affect is appropriate.    Laboratory Data:  Most Recent 3 CBC's:Recent Labs   Lab Test 10/27/22  1004 10/19/22  0802 10/13/22  0947   WBC 12.4* 9.8 6.2   HGB 8.2* 8.2* 7.7*   MCV 96 94 96    114* 135*   ANEUTAUTO 8.4* 7.3 3.9     Most Recent 3 BMP's:  Recent Labs   Lab Test 10/27/22  1004 10/19/22  0802 10/13/22  0947    139 136   POTASSIUM 3.5 3.3* 3.8   CHLORIDE 107 103 102   CO2 27 26 25   BUN 19.7 17.6 24.5*   CR 1.14 1.20* 1.26*   ANIONGAP 7 10 9   COTY 9.0 8.9 8.6*   * 137* 162*   PROTTOTAL 6.2* 5.8* 6.6   ALBUMIN 3.4* 3.2* 3.4*    Most Recent 3 LFT's:  Recent Labs   Lab Test 10/27/22  1004 10/19/22  0802 10/13/22  0947   AST 35 30 28   ALT 23 31 52*   ALKPHOS 175* 208* 256*   BILITOTAL <0.2 0.2 0.4   I reviewed the above labs today.    Assessment and Plan:  1. 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 Now starting on Yondelis due to lack of response to Gemzar and significant toxicities. We reviewed side effects and their management in detail today. He will be admitted  tomorrow to start treatment. He will see Maynor Rios PA-C on 12/5 prior to cycle 2.     2. Mucositis  Recurrent, ongoing with all treatment we have done. Refractory to multiple interventions. Now finally improved with Doxepin. Continue.      3. Edema  2/2 gemcitabine. ED work-up negative for infection. US negative for DVT. Previously, discussed lasix but he prefers to hold off. Could consider lymphedema wraps if worsens again.     4. GI  Dyspepsia: Continue Omeprazole 40mg daily. Continue Tums PRN     CINV: Zofran PRN.      5. 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 Tao. Stable.     Hypothyroidism continue Synthroid 75mcg daily, endo following      6. Derm  Rosacea: Long standing issue, continue Metrogel PRN     7. MSK  Left shoulder/back/chest wall pain 2/2 tumor, following with palliative. Pain has worsened again. Will try resuming Celebrex 200 mg bid      8. ENT  Hoarseness: Thought 2/2 mediastinal mass vs irritation from prior biopsy. Following with ENT, s/p vocal cord injection 7/1/21. Following with voice clinic.      9. FEN  Hypokalemia: Recurrent issue, continue 20meq daily.      Hypophosphatemia: Continue 250mg phos BID for recurrent issues.      10. Psych/Neuro  Anxiety/depression: Improved, on Lexapro and feels like it is helping. Continue.      Fatigue: Encouraged daily exercise. He would like to hold off on a trial of Ritalin due to the potential for worsening anorexia.     Continue PRN Imitrex for migraines.     60 minutes spent on the date of the encounter doing chart review, review of test results, interpretation of tests, patient visit and documentation           Again, thank you for allowing me to participate in the care of your patient.      Sincerely,    Demetra Nicole PA-C

## 2022-11-15 NOTE — NURSING NOTE
Chief Complaint   Patient presents with     Port Draw     Labs drawn by RN via port.     Port accessed with 20 gauge 3/4 inch flat needle by RN, labs collected, line flushed with saline and heparin.  Vitals taken. Pt checked in for appointment(s).    Carito Pinzon RN

## 2022-11-15 NOTE — PROGRESS NOTES
[facility]: Cancer Care                                                                                        Called to let Ifrah know that I received a call from Xochitl HERRERA inpatient stating that she is concerned about his length of stay for chemotherapy and medicare.  She stated that because his treatment is for only 24 hours, he will be listed as observation status and he might incur a hefty bill.  Let her know that [facility] will be called to see if this can be done as an outpatient.      Called [facility] and spoke with Gabrielle GREEN and let her know of the above situation.  She states that per medicare guidelines, [facility] is not contracted with them and no part of his chemotherapy can come from them.  She verified this with the intake team.  She wasn't sure if this could be done as an outpatient though.    Sent an urgent IB to the [facility] inquiring if he has coverage for yondelis as an outpatient.  Received notification from them that he is covered at the [facility].    [facility] pharmacy mgr will be working on making his treatment.  He will be scheduled for infusion today at 2:00pm.  He has had his lab work and teaching done already.      Called Ifrah and informed him of all of the above and let him know to check in on the second floor.  Let him know that he will need to come into clinic to get disconnected tomorrow and for any labs to IVF that Maynor REDMAN might want him to have.  His only request was if these can be done at the [facility].  Let him know that we will try to get him there tomorrow, but it might not work due to availability.  However, all future appointments will be made at [facility].  All of his questions were answered and he verbalized understanding of the plan of care.    Harriett kim and Xochitl HERRERA were notified as well.          Signature:  Luiz Castaneda RN

## 2022-11-15 NOTE — PROGRESS NOTES
Infusion Nursing Note:  Ifrah Huitron presents today for Cycle 1 Day 1 Trabectedin (Yondelis) pump connect.    Patient seen by provider today: No   present during visit today: Not Applicable.    Note: Patient receiving Yondelis for the first time. Pt reoriented to infusion room and call light. Chemotherapy teaching done previously by Luiz Damon RNCC. Due to insurance issues, patient could not get pump done in the inpatient setting. Reinforced chemotherapy teaching/side effects.    Patient arrives feeling well. States he has antiemetics at home already. No further questions or concerns.    Per julio Abreu RN, patient able to get disconnect at [facility] tomorrow at 3pm (much closer to home). Pt updated with information.    Copy of AVS reviewed with patient. Pt instructed to call care coordinator, triage (or MD on call if after hours/weekends) with chills/temp >=100.4, questions/concerns. Pt stated understanding of plan.       Intravenous Access:  Implanted Port.    Treatment Conditions:  Lab Results   Component Value Date    HGB 9.2 (L) 11/15/2022    WBC 10.6 11/15/2022    ANEU 7.4 10/06/2022    ANEUTAUTO 7.5 11/15/2022     11/15/2022      Lab Results   Component Value Date     11/15/2022    POTASSIUM 3.5 11/15/2022    MAG 2.3 11/15/2022    CR 1.16 11/15/2022    COTY 9.0 11/15/2022    BILITOTAL 0.2 11/15/2022    ALBUMIN 3.5 11/15/2022    ALT 15 11/15/2022    AST 19 11/15/2022     Results reviewed, labs MET treatment parameters, ok to proceed with treatment.    Post Infusion Assessment:  Patient tolerated infusion without incident.  Blood return noted pre and post infusion.  Site patent and intact, free from redness, edema or discomfort.  No evidence of extravasations.  Access discontinued per protocol.     Yondelis continuous infusion pump connected at 1430.  Positive blood return from port at time of pump hook up. All connections secured, taped, and double-checked by Sendy  PETER Heath.  Pump to infuse over 24 hours at 20.8cc/hour.  Continuous pump will be disconnected on 11/16 at 1500 by [facility] RN. Patient aware of pump disconnect date and time. Pt informed to call our triage line with any pump issues.      Discharge Plan:   Patient declined prescription refills.  Discharge instructions reviewed with: Patient.  Patient and/or family verbalized understanding of discharge instructions and all questions answered.  Copy of AVS reviewed with patient and/or family.  Patient will return ~12/6 for Cycle 2. Luiz Castaneda RNCC to get pt scheduled for future appointments/location.  Patient discharged in stable condition accompanied by: self.  Departure Mode: Ambulatory.      Kerry Euceda RN                       Doxycycline Pregnancy And Lactation Text: This medication is Pregnancy Category D and not consider safe during pregnancy. It is also excreted in breast milk but is considered safe for shorter treatment courses.

## 2022-11-15 NOTE — PATIENT INSTRUCTIONS
Contact Numbers    [facility] [place] (for Scheduling/Triage/After Hours Nurse Line): [phone]    Please call the [facility] nurse triage or the after hours nurse line if you experience a temperature greater than or equal to 100.5, shaking chills, have uncontrolled nausea, vomiting and/or diarrhea, dizziness, lightheadedness, shortness of breath, chest pain, bleeding, unexplained bruising, or if you have any other new/concerning symptoms, questions or concerns.     If you are having any concerning symptoms or wish to speak to a provider before your next infusion visit, please call your care coordinator or triage to notify them so we can adequately serve you.     If you need any refills on medications (narcotics or other medications), please call before your infusion appointment.          Lab Results:  Recent Results (from the past 12 hour(s))   Comprehensive metabolic panel    Collection Time: 11/15/22 10:34 AM   Result Value Ref Range    Sodium 142 136 - 145 mmol/L    Potassium 3.5 3.4 - 5.3 mmol/L    Chloride 107 98 - 107 mmol/L    Carbon Dioxide (CO2) 28 22 - 29 mmol/L    Anion Gap 7 7 - 15 mmol/L    Urea Nitrogen 23.2 (H) 8.0 - 23.0 mg/dL    Creatinine 1.16 0.67 - 1.17 mg/dL    Calcium 9.0 8.8 - 10.2 mg/dL    Glucose 122 (H) 70 - 99 mg/dL    Alkaline Phosphatase 166 (H) 40 - 129 U/L    AST 19 10 - 50 U/L    ALT 15 10 - 50 U/L    Protein Total 6.2 (L) 6.4 - 8.3 g/dL    Albumin 3.5 3.5 - 5.2 g/dL    Bilirubin Total 0.2 <=1.2 mg/dL    GFR Estimate 66 >60 mL/min/1.73m2   Magnesium    Collection Time: 11/15/22 10:34 AM   Result Value Ref Range    Magnesium 2.3 1.7 - 2.3 mg/dL   Phosphorus    Collection Time: 11/15/22 10:34 AM   Result Value Ref Range    Phosphorus 2.4 (L) 2.5 - 4.5 mg/dL   T4 free    Collection Time: 11/15/22 10:34 AM   Result Value Ref Range    Free T4 1.27 0.90 - 1.70 ng/dL   CBC with platelets and differential    Collection Time: 11/15/22 10:34 AM   Result Value Ref Range    WBC Count 10.6 4.0 - 11.0  10e3/uL    RBC Count 3.15 (L) 4.40 - 5.90 10e6/uL    Hemoglobin 9.2 (L) 13.3 - 17.7 g/dL    Hematocrit 30.3 (L) 40.0 - 53.0 %    MCV 96 78 - 100 fL    MCH 29.2 26.5 - 33.0 pg    MCHC 30.4 (L) 31.5 - 36.5 g/dL    RDW 17.7 (H) 10.0 - 15.0 %    Platelet Count 199 150 - 450 10e3/uL    % Neutrophils 71 %    % Lymphocytes 17 %    % Monocytes 10 %    % Eosinophils 1 %    % Basophils 0 %    % Immature Granulocytes 1 %    NRBCs per 100 WBC 0 <1 /100    Absolute Neutrophils 7.5 1.6 - 8.3 10e3/uL    Absolute Lymphocytes 1.8 0.8 - 5.3 10e3/uL    Absolute Monocytes 1.1 0.0 - 1.3 10e3/uL    Absolute Eosinophils 0.1 0.0 - 0.7 10e3/uL    Absolute Basophils 0.0 0.0 - 0.2 10e3/uL    Absolute Immature Granulocytes 0.1 <=0.4 10e3/uL    Absolute NRBCs 0.0 10e3/uL   CK total    Collection Time: 11/15/22 10:34 AM   Result Value Ref Range    CK 42 39 - 308 U/L

## 2022-11-16 NOTE — PROGRESS NOTES
Infusion Nursing Note:  Ifrah Huitron presents today for pump disconnect.    Patient seen by provider today: No   present during visit today: Not Applicable.    Note: Chemo fully infused. Patient states he has a headache today. No other new complaints.    Intravenous Access:  Implanted Port.    Treatment Conditions:  Not Applicable.    Post Infusion Assessment:  Patient tolerated infusion without incident.  Blood return note.  Site patent and intact, free from redness, edema or discomfort.  No evidence of extravasations.  Access discontinued per protocol.     Discharge Plan:   Discharge instructions reviewed with: Patient.  Patient and/or family verbalized understanding of discharge instructions and all questions answered.  AVS to patient via [employer].  Patient will return 12/5/2022 for video vist with TRUMAN Mcguire PA for next appointment.   Patient discharged in stable condition accompanied by: self.  Departure Mode: Ambulatory.    Erin Lowe RN

## 2022-11-17 NOTE — PROGRESS NOTES
[facility]: Cancer Care                                                                                        Called [facility] to let him know Maynor HERRERA will want for him to have weekly labs at [place].  His disconnect time will be at 2:30 tomorrow and they can get it done at the [facility].       Signature:  Luiz Castaneda RN

## 2022-11-23 NOTE — NURSING NOTE
"Oncology Rooming Note    November 23, 2022 2:42 PM   Ifrah Huitron is a 72 year old male who presents for:    Chief Complaint   Patient presents with     Port Draw     Labs drawn via port by RN in lab. VS taken.      Oncology Clinic Visit     Sarcoma     Initial Vitals: BP (!) 152/82 (BP Location: Right arm, Patient Position: Sitting, Cuff Size: Adult Regular)   Pulse 92   Temp 97.9  F (36.6  C) (Oral)   Resp 18   Wt 68 kg (150 lb)   SpO2 97%   BMI 23.49 kg/m   Estimated body mass index is 23.49 kg/m  as calculated from the following:    Height as of 9/18/22: 1.702 m (5' 7\").    Weight as of this encounter: 68 kg (150 lb). Body surface area is 1.79 meters squared.  Mild Pain (2) Comment: Data Unavailable   No LMP for male patient.  Allergies reviewed: Yes  Medications reviewed: Yes    Medications: Medication refills not needed today.  Pharmacy name entered into [facility]: [facility] - [facility] [id number] [address] [facility] [address]    Clinical concerns: none.       Gee Anthony            "

## 2022-11-23 NOTE — PROGRESS NOTES
Oncology/Hematology Visit Note  Nov 23, 2022    Reason for Visit: Follow up of spindle cell sarcoma     History of Present Illness: Ifrah Huitron is a 72 year old male with PMH rosacea, pituitary macroadenoma s/p resection, GERD, kidney stones, DJD with metastatic spindle cell sarcoma. He presented to his PCP March 2021 with chest pain/left shoulder and back pain that led to imaging which revealed multiple lung mets. There were difficulties in getting diagnostic biopsy, eventually biopsy of mediastinal mass with spindle cell sarcoma. There was high PD-L1 expression (90%). Baseline imaging 6/21/21 with progression of lung mets and left-sided subdiaphragmatic mass, stable liver lesions. 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      He started Keytruda 6/21/21. CT 8/2/21 with positive response to treatment. CT 10/18/21 with mixed response- LUQ mass larger, anterior mediastinal and left anterior pleural mass smaller. Keytruda stopped.     Started on Doxil + Ifosfamide 10/26/21. Poorly tolerated with mucositis, nausea, poor oral intake. CT with stable to positive response.      Received C2 12/1/21 (delayed for tolerance issues) with 10% dose reduction.     Received C3 1/4/22 with same 10% dose reduction. He had issues with nausea inpatient along with recurrent thrush.      Received C4 2/2/22 with same 10% dose reduction. Had more significant neurotoxicity so only received 5.5 days. Symptoms resolved after stopping Ifosfamide.      CT 3/8/22 with positive response to treatment.      He was on Doxil alone but has been dealing with worsening mouth and skin toxicity and has required delays and dose reductions. CT 8/8/22 with disease progression.     Switched to Gemzar 8/10/22-10/6/22    10/17/22 CT CAP shows slightly enlarging mass along the posterior superior spleen, possibly slightly increased hypoattenuating nodular mass in the anterior  left superior mediastinum adjacent to the medial left upper lung lobe and new posterior left hemithorax pleural thickening are concerning for subtle progression of patient's sarcoma and metastases.    11/7/22 CT CAP shows slight interval enlargement of previously seen anterior mediastinal/anteromedial left upper lobe and splenic masses.    11/15 Cycle 1 of trabectedin    Interval History:  -Ifrah is feeling somewhat poorly today. His biggest complaint is persistent nausea. He has not had emesis. He has been taking zofran but doesn't seem to be helping much. He also tried compazine. He took ativan last night at bedtime and this helped but made him sleepy. He feels he is taking in plenty of fluids but not taking in much for solid food. He has had some Ramen and some oatmeal.  -Energy is low.  -Mouth is sore. He states that his thrush is also back. Using Doxepin and leftover nystatin.  -Heartburn symptoms managed with omeprazole and tums.   -Leg swelling is much better.  -Mid back pain which wraps around to abdomen is about the same. Just started celebrex. Also uses Bengay and ice prn. Ice helps sometimes.  -SOB only when lying on left side or when going up steps.  -Having some diarrhea about 4-5 stools per day. Taking imodium. No blood in stool. No fever. No new abdominal pain.   -No bleeding concerns.  -No chest pain.  -Urinating without difficulty and urine is clear.     Review of Systems:  Patient denies any of the following except if noted above: fevers, chills, difficulty with energy, vision or hearing changes, chest pain, dyspnea, abdominal pain, nausea, vomiting, diarrhea, constipation, urinary concerns, headaches, numbness, tingling, issues with sleep or mood. He also denies lumps, bumps, rashes or skin lesions, bleeding or bruising issues.    Objective:  BP (!) 152/82 (BP Location: Right arm, Patient Position: Sitting, Cuff Size: Adult Regular)   Pulse 92   Temp 97.9  F (36.6  C) (Oral)   Resp 18   Wt 68 kg  (150 lb)   SpO2 97%   BMI 23.49 kg/m     General: Well-appearing male in no acute distress.  Eyes: EOMI, PERRL. No scleral icterus.  ENT: Oral mucosa is moist with thrush present on tongues; no visible sores on mucosa.    Lymphatic: Neck is supple without cervical or supraclavicular lymphadenopathy.   Cardiovascular: RRR No murmurs, gallops, or rubs. Trace edema of BLE.  Respiratory: CTA bilaterally. No wheezes or crackles.  Gastrointestinal: BS +. Abdomen soft, non-tender. No palpable masses.   Neurologic: Cranial nerves II through XII are grossly intact.  Skin: No rashes, petechiae, or bruising noted on exposed skin.  Psych: Affect appropriate. Pleasant, talkative.  Laboratory Data:  Most Recent 3 CBC's:  Recent Labs   Lab Test 22  1429 11/15/22  1034 10/27/22  1004 10/19/22  0802   WBC 6.0 10.6 12.4* 9.8   HGB 8.4* 9.2* 8.2* 8.2*   MCV 94 96 96 94   PLT 74* 199 258 114*   ANEUTAUTO  --  7.5 8.4* 7.3     Most Recent 3 BMP's:  Recent Labs   Lab Test 22  1429 11/15/22  1034 10/27/22  1004    142 141   POTASSIUM 4.3 3.5 3.5   CHLORIDE 104 107 107   CO2 23 28 27   BUN 31.9* 23.2* 19.7   CR 1.74* 1.16 1.14   ANIONGAP 11 7 7   COTY 8.5* 9.0 9.0   * 122* 124*   PROTTOTAL 5.9* 6.2* 6.2*   ALBUMIN 3.3* 3.5 3.4*    Most Recent 3 LFT's:  Recent Labs   Lab Test 22  1429 11/15/22  1034 10/27/22  1004   AST 62* 19 35   * 15 23   ALKPHOS 187* 166* 175*   BILITOTAL 1.3* 0.2 <0.2   CK: 51  Ph: 3.3  M.3    I reviewed the above labs today.    IMAGING  No no new imaging today.    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   Started on Yondelis due to lack of response to Gemzar and significant toxicities. Received Cycle 1 on 11/15/22. He is feeling somewhat poorly largely s/t nausea and limited intake of solid foods as well as mouth discomfort. Plan will be to administer IV fluid in clinic today as well as antiemetics in  hopes of improving symptoms and allowing for improved nutrition.     Labs with elevated creatinine. Trabectedin can raise creatinine. CK normal and no new muscle pain so not concerned for rhabdo. Will give fluids in clinic today. Hgb 8.4 and plt 74, no bleeding concerns. Will plan to recheck labs Friday. May need transfusion or further IV fluids pending results. Have requested labs and possible transfusion twice next week.     Return to clinic with Maynor Rios PA-C on 12/5 prior to cycle 2 pending clinical course.     #Mucositis/Thrush  -Continue Doxepin PRN for mucositis  -Nystatin 4 times daily for 10 days for thrush    # GI  Dyspepsia: Continue Omeprazole 40mg daily. Continue Tums PRN     CINV: Zofran and compazine prn. Can alternate. [place] ativan for bedtime given drowsiness. Will administer emend/decadron in clinic today along with 1 Liter of NS.    #MSK  Left shoulder/back/chest wall pain 2/2 tumor, following with palliative. Stable today  Continue Celebrex 200 mg bid   Ice and BenGay prn     #Edema  US 9/27/22 negative for DVT. Previously, discussed lasix but he prefers to hold off. Could consider lymphedema wraps if worsens again. Swelling better today.    #HUSSEIN  -CK normal  -1L NS today  -Repeat labs in 2 days; consider repeat IV fluids if not resolved.    #Elevated LFT's  -Grade 1; likely s/t to treatment. Monitor for now.      #Fatigue  -Multifactorial. Likely worse d/t impaired oral intake. Control nausea as above and give IVF today.  -Previously was discussed by another provider thathe would like to hold off on a trial of Ritalin due to the potential for worsening anorexia.     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 Tao. Stable.  Hypothyroidism continue Synthroid 75mcg daily, endo following      Amber Scheierl, CNP    90 minutes spent on the date of the encounter doing chart review, review of test results, interpretation of tests,  patient visit and documentation

## 2022-11-23 NOTE — LETTER
11/23/2022         RE: Ifrah [place]uitron  [address] [facility] [address]        Dear Colleague,    Thank you for referring your patient, Ifrah Huitron, to the [facility]. Please see a copy of my visit note below.      Oncology/Hematology Visit Note  Nov 23, 2022    Reason for Visit: Follow up of spindle cell sarcoma     History of Present Illness: Ifrah Huitron is a 72 year old male with PMH rosacea, pituitary macroadenoma s/p resection, GERD, kidney stones, DJD with metastatic spindle cell sarcoma. He presented to his PCP March 2021 with chest pain/left shoulder and back pain that led to imaging which revealed multiple lung mets. There were difficulties in getting diagnostic biopsy, eventually biopsy of mediastinal mass with spindle cell sarcoma. There was high PD-L1 expression (90%). Baseline imaging 6/21/21 with progression of lung mets and left-sided subdiaphragmatic mass, stable liver lesions. 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      He started Keytruda 6/21/21. CT 8/2/21 with positive response to treatment. CT 10/18/21 with mixed response- LUQ mass larger, anterior mediastinal and left anterior pleural mass smaller. Keytruda stopped.     Started on Doxil + Ifosfamide 10/26/21. Poorly tolerated with mucositis, nausea, poor oral intake. CT with stable to positive response.      Received C2 12/1/21 (delayed for tolerance issues) with 10% dose reduction.     Received C3 1/4/22 with same 10% dose reduction. He had issues with nausea inpatient along with recurrent thrush.      Received C4 2/2/22 with same 10% dose reduction. Had more significant neurotoxicity so only received 5.5 days. Symptoms resolved after stopping Ifosfamide.      CT 3/8/22 with positive response to treatment.      He was on Doxil alone but has been dealing with worsening mouth and skin toxicity and has required delays and  dose reductions. CT 8/8/22 with disease progression.     Switched to Gemzar 8/10/22-10/6/22    10/17/22 CT CAP shows slightly enlarging mass along the posterior superior spleen, possibly slightly increased hypoattenuating nodular mass in the anterior left superior mediastinum adjacent to the medial left upper lung lobe and new posterior left hemithorax pleural thickening are concerning for subtle progression of patient's sarcoma and metastases.    11/7/22 CT CAP shows slight interval enlargement of previously seen anterior mediastinal/anteromedial left upper lobe and splenic masses.    11/15 Cycle 1 of trabectedin    Interval History:  -Ifrah is feeling somewhat poorly today. His biggest complaint is persistent nausea. He has not had emesis. He has been taking zofran but doesn't seem to be helping much. He also tried compazine. He took ativan last night at bedtime and this helped but made him sleepy. He feels he is taking in plenty of fluids but not taking in much for solid food. He has had some Ramen and some oatmeal.  -Energy is low.  -Mouth is sore. He states that his thrush is also back. Using Doxepin and leftover nystatin.  -Heartburn symptoms managed with omeprazole and tums.   -Leg swelling is much better.  -Mid back pain which wraps around to abdomen is about the same. Just started celebrex. Also uses Bengay and ice prn. Ice helps sometimes.  -SOB only when lying on left side or when going up steps.  -Having some diarrhea about 4-5 stools per day. Taking imodium. No blood in stool. No fever. No new abdominal pain.   -No bleeding concerns.  -No chest pain.  -Urinating without difficulty and urine is clear.     Review of Systems:  Patient denies any of the following except if noted above: fevers, chills, difficulty with energy, vision or hearing changes, chest pain, dyspnea, abdominal pain, nausea, vomiting, diarrhea, constipation, urinary concerns, headaches, numbness, tingling, issues with sleep or mood. He  also denies lumps, bumps, rashes or skin lesions, bleeding or bruising issues.    Objective:  BP (!) 152/82 (BP Location: Right arm, Patient Position: Sitting, Cuff Size: Adult Regular)   Pulse 92   Temp 97.9  F (36.6  C) (Oral)   Resp 18   Wt 68 kg (150 lb)   SpO2 97%   BMI 23.49 kg/m     General: Well-appearing male in no acute distress.  Eyes: EOMI, PERRL. No scleral icterus.  ENT: Oral mucosa is moist with thrush present on tongues; no visible sores on mucosa.    Lymphatic: Neck is supple without cervical or supraclavicular lymphadenopathy.   Cardiovascular: RRR No murmurs, gallops, or rubs. Trace edema of BLE.  Respiratory: CTA bilaterally. No wheezes or crackles.  Gastrointestinal: BS +. Abdomen soft, non-tender. No palpable masses.   Neurologic: Cranial nerves II through XII are grossly intact.  Skin: No rashes, petechiae, or bruising noted on exposed skin.  Psych: Affect appropriate. Pleasant, talkative.  Laboratory Data:  Most Recent 3 CBC's:  Recent Labs   Lab Test 22  1429 11/15/22  1034 10/27/22  1004 10/19/22  0802   WBC 6.0 10.6 12.4* 9.8   HGB 8.4* 9.2* 8.2* 8.2*   MCV 94 96 96 94   PLT 74* 199 258 114*   ANEUTAUTO  --  7.5 8.4* 7.3     Most Recent 3 BMP's:  Recent Labs   Lab Test 22  1429 11/15/22  1034 10/27/22  1004    142 141   POTASSIUM 4.3 3.5 3.5   CHLORIDE 104 107 107   CO2 23 28 27   BUN 31.9* 23.2* 19.7   CR 1.74* 1.16 1.14   ANIONGAP 11 7 7   COTY 8.5* 9.0 9.0   * 122* 124*   PROTTOTAL 5.9* 6.2* 6.2*   ALBUMIN 3.3* 3.5 3.4*    Most Recent 3 LFT's:  Recent Labs   Lab Test 22  1429 11/15/22  1034 10/27/22  1004   AST 62* 19 35   * 15 23   ALKPHOS 187* 166* 175*   BILITOTAL 1.3* 0.2 <0.2   CK: 51  Ph: 3.3  M.3    I reviewed the above labs today.    IMAGING  No no new imaging today.    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   Started on Yondelis due to lack of response to  Gemzar and significant toxicities. Received Cycle 1 on 11/15/22. He is feeling somewhat poorly largely s/t nausea and limited intake of solid foods as well as mouth discomfort. Plan will be to administer IV fluid in clinic today as well as antiemetics in hopes of improving symptoms and allowing for improved nutrition.     Labs with elevated creatinine. Trabectedin can raise creatinine. CK normal and no new muscle pain so not concerned for rhabdo. Will give fluids in clinic today. Hgb 8.4 and plt 74, no bleeding concerns. Will plan to recheck labs Friday. May need transfusion or further IV fluids pending results. Have requested labs and possible transfusion twice next week.     Return to clinic with Maynor Rios PA-C on 12/5 prior to cycle 2 pending clinical course.     #Mucositis/Thrush  -Continue Doxepin PRN for mucositis  -Nystatin 4 times daily for 10 days for thrush    # GI  Dyspepsia: Continue Omeprazole 40mg daily. Continue Tums PRN     CINV: Zofran and compazine prn. Can alternate. [place] ativan for bedtime given drowsiness. Will administer emend/decadron in clinic today along with 1 Liter of NS.    #MSK  Left shoulder/back/chest wall pain 2/2 tumor, following with palliative. Stable today  Continue Celebrex 200 mg bid   Ice and BenGay prn     #Edema  US 9/27/22 negative for DVT. Previously, discussed lasix but he prefers to hold off. Could consider lymphedema wraps if worsens again. Swelling better today.    #HUSSEIN  -CK normal  -1L NS today  -Repeat labs in 2 days; consider repeat IV fluids if not resolved.    #Elevated LFT's  -Grade 1; likely s/t to treatment. Monitor for now.      #Fatigue  -Multifactorial. Likely worse d/t impaired oral intake. Control nausea as above and give IVF today.  -Previously was discussed by another provider thathe would like to hold off on a trial of Ritalin due to the potential for worsening anorexia.     Not discussed today:  #Endo  Hypopituitarism: 2/2 prior  resection, recently switched to Dr. Tao. Stable.  Hypothyroidism continue Synthroid 75mcg daily, endo following        90 minutes spent on the date of the encounter doing chart review, review of test results, interpretation of tests, patient visit and documentation         Again, thank you for allowing me to participate in the care of your patient.      Sincerely,    Amber J. Scheierl, APRN CNP

## 2022-11-23 NOTE — NURSING NOTE
Chief Complaint   Patient presents with     Port Draw     Labs drawn via port by RN in lab. VS taken.      Port accessed with 20 gauge 3/4 inch flat needle and labs drawn by RN.  Port flushed with saline and heparin.  Pt tolerated well.  VS taken.  Pt checked in for next appt.    Lourdes Rangel RN

## 2022-11-23 NOTE — PATIENT INSTRUCTIONS
Dear Ifrah Huitron    Thank you for choosing [facility] ([facility]) for your infusion.  The following information is a summary of our appointment as well as important reminders.      We look forward in seeing you on your next appointment here at [facility] ([facility]).  Please don t hesitate to call us at [phone] to reschedule any of your appointments or to speak with one of the [facility] registered nurses.  It was a pleasure taking care of you today.    Sincerely,    [facility]  [address] [place] [address]  [id number]  Phone:  [phone]

## 2022-11-23 NOTE — LETTER
11/23/2022         RE: If[place]uitron  [address] [address] [place] [place] [address]        Dear Colleague,    Thank you for referring your patient, Ifrah Huitron, to the [facility]. Please see a copy of my visit note below.    Infusion Nursing Note:  Ifrah Huitron presents today for IVF and antiemetics.    Patient seen by provider today: Yes, Amber Scheierl ARNP in Oncology   present during visit today: Not Applicable.    Note:   1L NS infused over 2 hours  Zofran given IVP over 5 minutes  Emend/Decadron infusion given over 20 minutes.    Intravenous Access:  Implanted Port.    Treatment Conditions:  Lab Results   Component Value Date    CR 1.74 (H) 11/23/2022     Results reviewed, labs MET treatment parameters, ok to proceed with treatment.    Administrations This Visit     0.9% sodium chloride BOLUS     Admin Date  11/23/2022 Action  New Bag Dose  1,000 mL Rate  500 mL/hr Route  Intravenous Administered By  Izabella Eli, PETER          fosaprepitant (EMEND) 150 mg, dexamethasone (DECADRON) 8 mg in sodium chloride 0.9 % 275.8 mL intermittent infusion     Admin Date  11/23/2022 Action  New Bag Dose   Rate  827.4 mL/hr Route  Intravenous Administered By  Izabella Eli, PETER          heparin 100 UNIT/ML injection 5 mL     Admin Date  11/23/2022 Action  Given Dose  5 mL Route  Intracatheter Administered By  Izabella Eli, RN          ondansetron (ZOFRAN) injection 8 mg     Admin Date  11/23/2022 Action  Given Dose  8 mg Route  Intravenous Administered By  Izabella Eli, PETER              BP (!) 162/98   Pulse 77     Post Infusion Assessment:  Patient tolerated infusion without incident.  Blood return noted pre and post infusion.  Site patent and intact, free from redness, edema or discomfort.  No evidence of extravasations.  Access discontinued per protocol.    Discharge Plan:   Patient declined prescription refills.  Discharge instructions reviewed with:  Patient.  Patient and/or family verbalized understanding of discharge instructions and all questions answered.  AVS to patient via [employer].   Patient discharged in stable condition accompanied by: self.  Departure Mode: Ambulatory.    Izabella Eli RN                        Again, thank you for allowing me to participate in the care of your patient.        Sincerely,        No name on file

## 2022-11-25 PROBLEM — N17.9 ACUTE RENAL FAILURE, UNSPECIFIED ACUTE RENAL FAILURE TYPE (H): Status: ACTIVE | Noted: 2022-01-01

## 2022-11-25 NOTE — CONFIDENTIAL NOTE
Patient's creatinine up again today; was 1.74 2 days ago, then received 1 L normal saline. Today it is 2.40. CK also trending up; still normal but went from 51 two days ago to 111 today. Discussed with his usual oncology PA. Recommend patient to come get IV fluids here today (given likely long wait time in ED) and then send to ED given acute kidney injury and risk of further injury after receiving trabectedin; needs routine monitoring and likely continuous IV fluids. Will attempt to give report to ED provider once patient leaves our clinic from infusion center. Patient verbalizes understanding and agrees to this plan.    Amber Scheierl, CNP

## 2022-11-25 NOTE — PROGRESS NOTES
Infusion Nursing Note:  Ifrah Huitron presents today for IV fluids.    Patient seen by provider today: No   present during visit today: Not Applicable.    Note:   Patient had labs drawn at [place].  Patient added on for IV fluids today.    He denies signs and symptoms of infection including:fever, cough, worsening shortness of breath, sore throat, vomiting, rash, or pain with urination. Patient is having liquids stools so far only 1 liquid stool today.  He is using imodium.  Declines need for pain intervention today.    Declines IV antiemetics.  He took oral Zofran on car drive here to infusion.    /92 today.  Patient denies headache or chest pain.  Reviewed plan with Amber Scheierl, NP.    TORB Amber Scheierl, NP/Evy Barajas RN at 1330 on 11/25/22  Give 500 mL of NS.  Patient to go to ED at [facility] via family transportation.   OK to leave port accessed.  No intervention needed for elevated blood pressure.  CBC does not have to be drawn in infusion.  CBC can be drawn in ED.    Reviewed all of above with patient.  He verbalized understanding.    Report called to PETER Montero in the ED.    Intravenous Access:  Implanted Port.    Treatment Conditions:   Latest Reference Range & Units 11/25/22 11:19   Sodium 136 - 145 mmol/L 137   Potassium 3.4 - 5.3 mmol/L 4.8   Chloride 98 - 107 mmol/L 104   Carbon Dioxide (CO2) 22 - 29 mmol/L 20 (L)   Urea Nitrogen 8.0 - 23.0 mg/dL 48.7 (H)   Creatinine 0.67 - 1.17 mg/dL 2.40 (H)   GFR Estimate >60 mL/min/1.73m2 28 (L)   Calcium 8.8 - 10.2 mg/dL 8.5 (L)   Anion Gap 7 - 15 mmol/L 13   Magnesium 1.7 - 2.3 mg/dL 2.3   Phosphorus 2.5 - 4.5 mg/dL 4.9 (H)   Albumin 3.5 - 5.2 g/dL 3.5   Protein Total 6.4 - 8.3 g/dL 6.2 (L)   Alkaline Phosphatase 40 - 129 U/L 193 (H)   ALT 10 - 50 U/L 90 (H)   AST 10 - 50 U/L 51 (H)   Bilirubin Total <=1.2 mg/dL 0.6   CK Total 39 - 308 U/L 111   Glucose 70 - 99 mg/dL 125 (H)       Post Infusion Assessment:  Patient tolerated infusion without  incident.  Blood return noted pre and post infusion.  Site patent and intact, free from redness, edema or discomfort.  No evidence of extravasations.  Port left in place and flushed with heparin.     Discharge Plan:   Discharge instructions reviewed with: Patient.  Patient and/or family verbalized understanding of discharge instructions and all questions answered.  AVS to patient via [employer].  Patient will return 12/1/22 for next appointment.   Patient discharged in stable condition accompanied by: wife.  Departure Mode: Ambulatory.      Evy Barajas RN

## 2022-11-25 NOTE — PATIENT INSTRUCTIONS
Contact Numbers  [facility]: [phone] (for symptom and scheduling needs)    Please call the [facility] Triage line if you experience a temperature greater than or equal to 100.4, shaking chills, have uncontrolled nausea, vomiting and/or diarrhea, dizziness, shortness of breath, chest pain, bleeding, unexplained bruising, or if you have any other new/concerning symptoms, questions or concerns.     If you are having any concerning symptoms or wish to speak to a provider before your next infusion visit, please call your care coordinator or triage to notify them so we can adequately serve you.     If you need a refill on a narcotic prescription or other medication, please call triage before your infusion appointment.

## 2022-11-26 NOTE — CONSULTS
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 He was sent to the ED from the oncology clinic due to worsening renal function. He was recently switched to his 4th line of chemotherapy with Cycle 1 of trabectedin (Yondelis) administered on 11/15 since then has had persistent nausea and worsening renal function.     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 CK nl this admission so no concern for rhabdomyolysis. HUSSEIN has improved with IVF. Also with pancytopenia. Neutropenic but no fever. Hb dropped to 6.8 and patient is receiving 1 unit of pRBC and reports feeling stronger. Can recheck Hb after transfusion, if > 7, no objection to discharge from oncology standpoint. Patient reports having an infusion appointment on 11/28/22 for IVFs. I don't see an appointment in the system but have requested for an appointment on 11/28/22 with a follow up CBC and BMP as patient may need transfusion support as well. Patient  has close oncologic follow up visit on 12/05/22 prior to Cycle 2 of trabectedin which is also scheduled on 12/05/22.    Recommendations:  - HUSSEIN improving with IVFs.   -  Receiving 1 unit pRBC with repeat CBC in 1 hour. Transfuse pRBC for goal Hb >7  - [facility] contacted to schedule IVF infusion appointment on 11/28/22 with CBC, and BMP, Patient has close oncologic follow up visit on 12/05/22 prior to Cycle 2 of trabectedin which is also scheduled on 12/05/22.    We will continue to follow this patient. Please do not hesitate to page with any questions or concerns.    Patient was seen and plan of care was discussed with attending physician Dr. Stein.    Larissa Alvarenga MD  Hematology/Oncology Fellow  Pager: [phone]    History of Present Illness:    Ifrah Hiutron is a 72 year old male admitted on 11/25/2022. 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 He was sent to the ED from the oncology clinic due to worsening renal function. He was recently switched to his 4th line of chemotherapy with Cycle 1 of trabectedin (Yondelis) administered on 11/15 since then has had persistent nausea and worsening renal function.  Denies emesis. Had loose stool for about 5 days, but improved yesterday and today. Denies fevers, cough, chills, or shortness of breath. He has been receiving IVF at the oncology clinic for support, he was seen at the clinic yesterday and noted to have worsening renal function in spite of IVF support hence sent to the ED for further management. Oncology consulted for assistance with co-management.    Baseline Cr. 1.16, Yesterday Cr. 2.4, today Cr 1.94.    Oncologic History:  Ifrah Huitron is a 72 year old male with PMH rosacea, pituitary macroadenoma s/p resection, GERD,  kidney stones, DJD with metastatic spindle cell sarcoma. He presented to his PCP March 2021 with chest pain/left shoulder and back pain that led to imaging which revealed multiple lung mets. There were difficulties in getting diagnostic biopsy, eventually biopsy of mediastinal mass with spindle cell sarcoma. There was high PD-L1 expression (90%). Baseline imaging 6/21/21 with progression of lung mets and left-sided subdiaphragmatic mass, stable liver lesions. 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      He started Keytruda 6/21/21. CT 8/2/21 with positive response to treatment. CT 10/18/21 with mixed response- LUQ mass larger, anterior mediastinal and left anterior pleural mass smaller. Keytruda stopped.     Started on Doxil + Ifosfamide 10/26/21. Poorly tolerated with mucositis, nausea, poor oral intake. CT with stable to positive response.      Received C2 12/1/21 (delayed for tolerance issues) with 10% dose reduction.     Received C3 1/4/22 with same 10% dose reduction. He had issues with nausea inpatient along with recurrent thrush.      Received C4 2/2/22 with same 10% dose reduction. Had more significant neurotoxicity so only received 5.5 days. Symptoms resolved after stopping Ifosfamide.      CT 3/8/22 with positive response to treatment.      He was on Doxil alone but has been dealing with worsening mouth and skin toxicity and has required delays and dose reductions. CT 8/8/22 with disease progression.     Switched to Gemzar 8/10/22-10/6/22     10/17/22 CT CAP shows slightly enlarging mass along the posterior superior spleen, possibly slightly increased hypoattenuating nodular mass in the anterior left superior mediastinum adjacent to the medial left upper lung lobe and new posterior left hemithorax pleural thickening are concerning for subtle progression of patient's sarcoma and metastases.     11/7/22 CT CAP shows slight  interval enlargement of previously seen anterior mediastinal/anteromedial left upper lobe and splenic masses.     11/15 Cycle 1 of trabectedin    I spent >60 minutes face-to-face and/or coordinating or discussing care plan. Over 50% of our time on the unit was spent counseling the patient and/or coordinating care      Review of Systems:  A comprehensive ROS was performed and found to be negative or non-contributory with the exception of that noted in the HPI above.    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     Thyroid disease     removed pituitary gland       Past Surgical History:  Past Surgical History:   Procedure Laterality Date     COLONOSCOPY N/A 12/17/2020    Procedure: COLONOSCOPY;  Surgeon: Ken Camacho MD;  Location: [facility]     ENT SURGERY       HERNIA REPAIR       INSERT PORT VASCULAR ACCESS Right 1/28/2022    Procedure: INSERTION, VASCULAR ACCESS PORT;  Surgeon: Daniel Kinney MD;  Location: [facility] [place] OR     IR CHEST PORT PLACEMENT > 5 YRS OF AGE  1/28/2022     LARYNGOSCOPY, EXCISE VOCAL CORD LESION MICROSCOPIC, COMBINED Left 07/01/2021    Procedure: MICROLARYNGOSCOPY, LEFT TRUE VOCAL CORD INJECTION WITH PROLARYN;  Surgeon: Kyree Bearden MD;  Location: [facility] OR     PHACOEMULSIFICATION WITH STANDARD INTRAOCULAR LENS IMPLANT Right 03/10/2021    Procedure: Cataract removal with implant.;  Surgeon: Jamir Mac MD;  Location: [facility] OR     PHACOEMULSIFICATION WITH STANDARD INTRAOCULAR LENS IMPLANT Left 04/05/2021    Procedure: Cataract removal with implant.;  Surgeon: Jamir Mac MD;  Location: [facility] OR     PICC DOUBLE LUMEN PLACEMENT Right 12/01/2021    5FR DL PICC, basilic vein. L-38cm, 1cm out.     PICC DOUBLE LUMEN PLACEMENT Right 01/04/2022    Right cephalic, 41 cm, 1 external length     PITUITARY EXCISION       tooth pulled 4/7  Right        Social History:  Social  "History     Socioeconomic History     Marital status:    Tobacco Use     Smoking status: Never     Smokeless tobacco: Never   Substance and Sexual Activity     Alcohol use: Yes     Comment: rare     Drug use: Never     Social Determinants of Health     Financial Resource Strain: Low Risk      Difficulty of Paying Living Expenses: Not very hard   Food Insecurity: No Food Insecurity     Worried About Running Out of Food in the Last Year: Never true     Ran Out of Food in the Last Year: Never true   Transportation Needs: No Transportation Needs     Lack of Transportation (Medical): No     Lack of Transportation (Non-Medical): No   Intimate Partner Violence: Unknown     Fear of Current or Ex-Partner: No     Emotionally Abused: No     Sexually Abused: No        Family History  Family History   Problem Relation Age of Onset     Lupus Mother      ALS Father      Rheumatoid Arthritis Sister        Outpatient Medications:  [COMPLETED] 0.9% sodium chloride BOLUS  [COMPLETED] heparin 100 UNIT/ML injection 5 mL    celecoxib (CELEBREX) 200 MG capsule, Take 1 capsule (200 mg) by mouth 2 times daily  clotrimazole (MYCELEX) 10 MG lozenge, Place 1 lozenge (10 mg) inside cheek 5 times daily (Patient not taking: No sig reported)  doxepin (SINEQUAN) 10 MG/ML (HIGH CONC) solution, Take 2.5 mLs (25 mg) by mouth 2 times daily as needed (rinse for mucositis) Dilute the 2.5 mL of doxepin to 5 ml total in sterile or distilled water.  escitalopram (LEXAPRO) 10 MG tablet, Take 1 tablet (10 mg) by mouth daily  guaiFENesin-codeine (GUAIFENESIN AC) 100-10 MG/5ML syrup, Take 10 mLs by mouth every 4 hours as needed for cough  hydrocortisone (CORTEF) 10 MG tablet, Take 20 mg in the morning and 10 mg in the afternoon  Insulin Syringe-Needle U-100 27.5G X 5/8\" 2 ML MISC, 1 each daily as needed (with solucortef for adrenal crisis)  levothyroxine (SYNTHROID/LEVOTHROID) 75 MCG tablet, Take 1 tablet (75 mcg) by mouth every morning  Menthol-Methyl " Salicylate (DALIA MALIK GREASELESS) cream, Apply topically 2 times daily  metroNIDAZOLE (METROGEL) 1 % external gel, Apply topically daily Try stronger dose due to increased symptoms after chemo.  nystatin (MYCOSTATIN) 353068 UNIT/ML suspension, Take 5 mLs (500,000 Units) by mouth 4 times daily for 10 days Swish and spit  omeprazole (PRILOSEC) 20 MG DR capsule, Take 1 capsule (20 mg) by mouth 2 times daily  ondansetron (ZOFRAN) 4 MG tablet, Take 1-2 tablets (4-8 mg) by mouth every 8 hours as needed for nausea  phosphorus tablet 250 mg (PHOSPHA 250 NEUTRAL) 250 MG per tablet, Take 1 tablet (250 mg) by mouth 2 times daily  potassium chloride ER (KLOR-CON M) 20 MEQ CR tablet, Take 1 tablet (20 mEq) by mouth daily  prochlorperazine (COMPAZINE) 5 MG tablet, Take 1 tablet (5 mg) by mouth every 6 hours as needed for nausea or vomiting . Caution: causes sedation.  SUMAtriptan (IMITREX) 50 MG tablet, Take 1 tablet (50 mg) by mouth at onset of headache for migraine May repeat in 2 hours. Max 4 tablets/24 hours. (Patient taking differently: Take 50 mg by mouth at onset of headache for migraine May repeat in 2 hours. Max 4 tablets/24 hours. PRN)  triamcinolone (KENALOG) 0.1 % external cream, Apply topically 2 times daily to bothersome skin areas.         Physical Exam:    Blood pressure (!) 146/81, pulse 74, temperature 97.3  F (36.3  C), temperature source Oral, resp. rate 16, weight 70.8 kg (156 lb), SpO2 100 %.    Constitutional: Awake and conversational. Non- toxic appearing.  HEENT: Normocephalic, atraumatic. Sclerae anicteric. Moist mucus membranes.  Respiratory: Breathing comfortable with no increased work on room air. Lungs clear to auscultation bilaterally, no crackles or wheezing noted.  Cardiovascular: Regular rate and rhythm. Normal S1 and S2. No murmurs. No peripheral edema.    GI: Abdomen is soft, non-distended, non-tender and without distension. Bowel sounds present and normoactive.  Skin: Skin is clean, dry, intact.  No jaundice appreciated.   Musculoskeletal/ Extremities: No redness, warmth, or swelling of the joints appreciated.  Neurologic: Alert and oriented. Speech normal.  Neuropsychiatric: Calm, affect congruent to situation. Appropriate mood and affect.     Labs & Studies: I personally reviewed the following studies:  ROUTINE LABS (Last four results):  CMP  Recent Labs   Lab 11/25/22  1706 11/25/22  1119 11/23/22  1429    137 138   POTASSIUM 5.1 4.8 4.3   CHLORIDE 106 104 104   CO2 19* 20* 23   ANIONGAP 11 13 11   * 125* 137*   BUN 47.9* 48.7* 31.9*   CR 2.17* 2.40* 1.74*   GFRESTIMATED 31* 28* 41*   COTY 8.0* 8.5* 8.5*   MAG  --  2.3 2.3   PHOS  --  4.9* 3.3   PROTTOTAL 5.6* 6.2* 5.9*   ALBUMIN 3.4* 3.5 3.3*   BILITOTAL 0.6 0.6 1.3*   ALKPHOS 172* 193* 187*   AST 50 51* 62*   ALT 89* 90* 114*     CBC  Recent Labs   Lab 11/25/22  1706 11/23/22  1429   WBC 2.5* 6.0   RBC 2.56* 2.88*   HGB 7.5* 8.4*   HCT 24.2* 27.1*   MCV 95 94   MCH 29.3 29.2   MCHC 31.0* 31.0*   RDW 16.6* 16.7*   PLT 51* 74*

## 2022-11-26 NOTE — H&P
[facility]    History and Physical - Hospitalist Service, GOLD TEAM        Date of Admission:  11/25/2022    Assessment & Plan      Ifrah Huitron is a 72 year old male admitted on 11/25/2022. He has a past medical history of spindle cell carcinoma, pituitary macroadenoma s/p resection, adrenal insufficiency, hypothyroidism, GERD, kidney stones, DJD, rosacea, anxiety and depression. He was sent from his clinic due to worsening renal function.  He is currently receiving chemotherapy and has had ongoing nausea, no emesis. Had loose stool for about 5 days, but improved yesterday and today. Denies fevers, cough, chills, or shortness of breath.    # HUSSEIN  GFR 28 and Cr. 2.4 at clinic. Slightly improved after fluids with last check GFR 31 and Cr. 2.17. November 15, 2022 shows GFR 66 and Cr. 1.16. 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   History began in 2021 when he sought medical care for back pain and found to have lung mets, biopsy showed spindle cell carcinoma.   - Oncology consult    # Adrenal insufficiency  # Pituitary, macroadenoma s/p resection  # Hypothyroidism  - PTA cortef and synthroid (full dose synthroid everyday except Friday 1/2 dose)    # Pancytopenia  Hgb 7.5, plts 51, and WBC 2.5. Currently undergoing chemotherapy  - Trend CBC  - Transfuse for Hgb less than 7  - Monitor for bleeding    # Elevated LFT's  Per oncology notes, likely from treatment and currently monitoring. ALT 89 and AST 50. This is improved from previous levels of  and AST 62 2 days ago.  - trend CMP    # Left mid back pain  Pain at site of tumor. Started on celebrex  - Hold Celebrex 200 mg bid due to HUSSEIN  - Lidocaine patch offered: patient asked for PRN order to be available  - tylenol PRN    # Mucositis/Thrush  -Continue Doxepin PRN for  mucositis  -Nystatin 4 times daily for 10 days for thrush (8 more days)    # GERD  - Continue Omeprazole 40mg daily    # Anxiety  # Depression  - Continue lexapro    # Rosacea  Symptoms exacerbated by chemotherapy  - PRN metronidazole gel        Diet: Combination Diet Regular Diet Adult  DVT Prophylaxis: Pneumatic Compression Devices  Bonner Catheter: Not present  Central Lines: PRESENT       Cardiac Monitoring: None  Code Status: Full Code         Disposition Plan      Expected Discharge Date: 11/27/2022           3-4 days     The patient's care was discussed with the Attending Physician, Dr. Dorman.    JEVON Angelo [facility]  Hospitalist Service, [facility]  Securely message with the Vocera Web Console (learn more here)  Text page via [facility] Paging/Directory   Please see signed in provider for up to date coverage information      ______________________________________________________________________    Chief Complaint   Sent from clinic for HUSSEIN    History is obtained from the patient    History of Present Illness   Ifrah NORMA Huitron is a 72 year old male who presented to the ED with worsening renal function. He is currently undergoing chemotherapy and renal function has been followed closely. He receives additional fluids in clinic due to chemo and on-going nausea. He was found to have lung mets in 2021, diagnosed with spindle cell carcinoma and has followed with oncology. He has had some changes in his chemo regimen during the course of his treatment. He is aware of current meds and treatment plans and is diligent about medication adherence. He has remained independent despite felling more fatigued with chemo.       Review of Systems    Skin: bruises  Eyes: negative  Ears/Nose/Throat: negative  Respiratory: No shortness of breath, dyspnea on exertion, cough, or hemoptysis  Cardiovascular: negative  Gastrointestinal: negative  Genitourinary:  negative  Musculoskeletal: back tenderness   Neurologic: negative  Psychiatric: negative  Hematologic/Lymphatic/Immunologic: negative  Endocrine: negative      Past Medical History    I have reviewed this patient's medical history and updated it with pertinent information if needed.   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  Past Surgical History:   Procedure Laterality Date     COLONOSCOPY N/A 12/17/2020    Procedure: COLONOSCOPY;  Surgeon: Ken Camacho MD;  Location: [facility]     ENT SURGERY       HERNIA REPAIR       INSERT PORT VASCULAR ACCESS Right 1/28/2022    Procedure: INSERTION, VASCULAR ACCESS PORT;  Surgeon: Daniel Kinney MD;  Location: [facility] [place]     IR CHEST PORT PLACEMENT > 5 YRS OF AGE  1/28/2022     LARYNGOSCOPY, EXCISE VOCAL CORD LESION MICROSCOPIC, COMBINED Left 07/01/2021    Procedure: MICROLARYNGOSCOPY, LEFT TRUE VOCAL CORD INJECTION WITH PROLARYN;  Surgeon: Kyree Bearden MD;  Location: [facility] OR     PHACOEMULSIFICATION WITH STANDARD INTRAOCULAR LENS IMPLANT Right 03/10/2021    Procedure: Cataract removal with implant.;  Surgeon: Jamir Mac MD;  Location: [facility]     PHACOEMULSIFICATION WITH STANDARD INTRAOCULAR LENS IMPLANT Left 04/05/2021    Procedure: Cataract removal with implant.;  Surgeon: Jamir Mac MD;  Location: [facility] OR     PICC DOUBLE LUMEN PLACEMENT Right 12/01/2021    5FR DL PICC, basilic vein. L-38cm, 1cm out.     PICC DOUBLE LUMEN PLACEMENT Right 01/04/2022    Right cephalic, 41 cm, 1 external length     PITUITARY EXCISION       tooth pulled 4/7  Right        Social History   I have reviewed this patient's social history and updated it with pertinent information if needed.  Social History  "    Tobacco Use     Smoking status: Never     Smokeless tobacco: Never   Substance Use Topics     Alcohol use: Yes     Comment: rare     Drug use: Never       Family History   I have reviewed this patient's family history and updated it with pertinent information if needed.  Family History   Problem Relation Age of Onset     Lupus Mother      ALS Father      Rheumatoid Arthritis Sister        Prior to Admission Medications   Prior to Admission Medications   Prescriptions Last Dose Informant Patient Reported? Taking?   Insulin Syringe-Needle U-100 27.5G X 5/8\" 2 ML MISC  Self No No   Si each daily as needed (with solucortef for adrenal crisis)   Menthol-Methyl Salicylate (DALIA MALIK GREASELESS) cream  Self Yes No   Sig: Apply topically 2 times daily   SUMAtriptan (IMITREX) 50 MG tablet   No No   Sig: Take 1 tablet (50 mg) by mouth at onset of headache for migraine May repeat in 2 hours. Max 4 tablets/24 hours.   Patient taking differently: Take 50 mg by mouth at onset of headache for migraine May repeat in 2 hours. Max 4 tablets/24 hours. PRN   celecoxib (CELEBREX) 200 MG capsule   Yes No   Sig: Take 1 capsule (200 mg) by mouth 2 times daily   clotrimazole (MYCELEX) 10 MG lozenge  Self No No   Sig: Place 1 lozenge (10 mg) inside cheek 5 times daily   Patient not taking: No sig reported   doxepin (SINEQUAN) 10 MG/ML (HIGH CONC) solution   No No   Sig: Take 2.5 mLs (25 mg) by mouth 2 times daily as needed (rinse for mucositis) Dilute the 2.5 mL of doxepin to 5 ml total in sterile or distilled water.   escitalopram (LEXAPRO) 10 MG tablet  Self No No   Sig: Take 1 tablet (10 mg) by mouth daily   guaiFENesin-codeine (GUAIFENESIN AC) 100-10 MG/5ML syrup   No No   Sig: Take 10 mLs by mouth every 4 hours as needed for cough   hydrocortisone (CORTEF) 10 MG tablet  Self No No   Sig: Take 20 mg in the morning and 10 mg in the afternoon   levothyroxine (SYNTHROID/LEVOTHROID) 75 MCG tablet  Self No No   Sig: Take 1 tablet (75 " mcg) by mouth every morning   metroNIDAZOLE (METROGEL) 1 % external gel   No No   Sig: Apply topically daily Try stronger dose due to increased symptoms after chemo.   nystatin (MYCOSTATIN) 608187 UNIT/ML suspension   No No   Sig: Take 5 mLs (500,000 Units) by mouth 4 times daily for 10 days Swish and spit   omeprazole (PRILOSEC) 20 MG DR capsule  Self Yes No   Sig: Take 1 capsule (20 mg) by mouth 2 times daily   ondansetron (ZOFRAN) 4 MG tablet  Self No No   Sig: Take 1-2 tablets (4-8 mg) by mouth every 8 hours as needed for nausea   phosphorus tablet 250 mg (PHOSPHA 250 NEUTRAL) 250 MG per tablet  Self No No   Sig: Take 1 tablet (250 mg) by mouth 2 times daily   potassium chloride ER (KLOR-CON M) 20 MEQ CR tablet  Self No No   Sig: Take 1 tablet (20 mEq) by mouth daily   prochlorperazine (COMPAZINE) 5 MG tablet   No No   Sig: Take 1 tablet (5 mg) by mouth every 6 hours as needed for nausea or vomiting . Caution: causes sedation.   triamcinolone (KENALOG) 0.1 % external cream  Self No No   Sig: Apply topically 2 times daily to bothersome skin areas.      Facility-Administered Medications: None     Allergies   Allergies   Allergen Reactions     Penicillins Hives and Swelling              Physical Exam   Vital Signs: Temp: 97.4  F (36.3  C) Temp src: Temporal BP: (!) 170/98 Pulse: 70   Resp: 17 SpO2: 100 % O2 Device: None (Room air)    Weight: 0 lbs 0 oz    Physical Exam   Constitutional:   Resting comfortably   Head: Normocephalic and atraumatic.   Eyes: Conjunctivae are normal. Pupils are equal, round, and reactive to light.    Oropharynx: Pharynx has no erythema or exudate, mucous membranes are moist  Neck:   No adenopathy, no bony tenderness  Cardiovascular: Regular rate and rhythm without murmurs or gallops  Pulmonary/Chest: Clear to auscultation bilaterally, with no wheezes or retractions. No respiratory distress. Port in place  GI: Soft with good bowel sounds.  Non-tender, non-distended, with no guarding, no  rebound, no peritoneal signs.   Back:  No bony or CVA tenderness. Pain left mid back  Musculoskeletal:  Trace edema LE  Skin: Skin is warm and dry. No rash noted. Bruising  Neurological: Alert and oriented to person, place, and time. Nonfocal exam  Psychiatric:  Normal mood and affect.      Data   Data reviewed today: I reviewed all medications, new labs and imaging results over the last 24 hours. I personally reviewed all labs and imaging for this encounter

## 2022-11-26 NOTE — ED PROVIDER NOTES
ED Provider Note  [facility]      History     Chief Complaint   Patient presents with     Abnormal Labs     HPI  Ifrah Huitron is a 72 year old male who presents emergency department with referral from outpatient oncology service for increasing creatinine, thought to be a medication side effect.  Patient has a history of spindle cell sarcoma and is undergoing chemotherapy.  There is concerned that one of his chemotherapy agents may be nephrotoxic and is a known side effect.  Patient reports a decrease in urine output over the past few days, has noticed some leg swelling but states that it has actually been getting better over the past several days.  Denies any dysuria, change in bowel habits.  Denies any chest pain or shortness of breath or new cough.    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     Thyroid disease     removed pituitary gland     Past Surgical History:   Procedure Laterality Date     COLONOSCOPY N/A 12/17/2020    Procedure: COLONOSCOPY;  Surgeon: Ken Camacho MD;  Location: [facility]     ENT SURGERY       HERNIA REPAIR       INSERT PORT VASCULAR ACCESS Right 1/28/2022    Procedure: INSERTION, VASCULAR ACCESS PORT;  Surgeon: Daniel Kinney MD;  [place] [facility] [place]     IR CHEST PORT PLACEMENT > 5 YRS OF AGE  1/28/2022     LARYNGOSCOPY, EXCISE VOCAL CORD LESION MICROSCOPIC, COMBINED Left 07/01/2021    Procedure: MICROLARYNGOSCOPY, LEFT TRUE VOCAL CORD INJECTION WITH PROLARYN;  Surgeon: Kyree Bearden MD;  Location: [facility]     PHACOEMULSIFICATION WITH STANDARD INTRAOCULAR LENS IMPLANT Right 03/10/2021    Procedure: Cataract removal with implant.;  Surgeon: Jamir Mac MD;  Location: [facility]     PHACOEMULSIFICATION WITH STANDARD INTRAOCULAR LENS IMPLANT Left 04/05/2021    Procedure: Cataract removal with implant.;  Surgeon: Jamir Mac MD;   "Location: [facility]     PICC DOUBLE LUMEN PLACEMENT Right 12/01/2021    5FR DL PICC, basilic vein. L-38cm, 1cm out.     PICC DOUBLE LUMEN PLACEMENT Right 01/04/2022    Right cephalic, 41 cm, 1 external length     PITUITARY EXCISION       tooth pulled 4/7  Right      celecoxib (CELEBREX) 200 MG capsule  clotrimazole (MYCELEX) 10 MG lozenge  doxepin (SINEQUAN) 10 MG/ML (HIGH CONC) solution  escitalopram (LEXAPRO) 10 MG tablet  guaiFENesin-codeine (GUAIFENESIN AC) 100-10 MG/5ML syrup  hydrocortisone (CORTEF) 10 MG tablet  Insulin Syringe-Needle U-100 27.5G X 5/8\" 2 ML MISC  levothyroxine (SYNTHROID/LEVOTHROID) 75 MCG tablet  Menthol-Methyl Salicylate (DALIA MALIK GREASELESS) cream  metroNIDAZOLE (METROGEL) 1 % external gel  nystatin (MYCOSTATIN) 135584 UNIT/ML suspension  omeprazole (PRILOSEC) 20 MG DR capsule  ondansetron (ZOFRAN) 4 MG tablet  phosphorus tablet 250 mg (PHOSPHA 250 NEUTRAL) 250 MG per tablet  potassium chloride ER (KLOR-CON M) 20 MEQ CR tablet  prochlorperazine (COMPAZINE) 5 MG tablet  SUMAtriptan (IMITREX) 50 MG tablet  triamcinolone (KENALOG) 0.1 % external cream      Allergies   Allergen Reactions     Penicillins Hives and Swelling            Family History  Family History   Problem Relation Age of Onset     Lupus Mother      ALS Father      Rheumatoid Arthritis Sister      Social History   Social History     Tobacco Use     Smoking status: Never     Smokeless tobacco: Never   Substance Use Topics     Alcohol use: Yes     Comment: rare     Drug use: Never      Past medical history, past surgical history, medications, allergies, family history, and social history were reviewed with the patient. No additional pertinent items.       Review of Systems   Constitutional: Negative for fever.   HENT: Negative for congestion.    Eyes: Negative for redness.   Respiratory: Negative for cough and shortness of breath.    Cardiovascular: Negative for chest pain.   Gastrointestinal: Negative for abdominal pain, " constipation, diarrhea and nausea.   Genitourinary: Positive for decreased urine volume. Negative for difficulty urinating.   Musculoskeletal: Negative for back pain.   Skin: Negative for rash.   Neurological: Negative for headaches.   Psychiatric/Behavioral: Negative for confusion.     A complete review of systems was performed with pertinent positives and negatives noted in the HPI, and all other systems negative.    Physical Exam   BP: (!) 170/98  Pulse: 70  Temp: 97.4  F (36.3  C)  Resp: 17  SpO2: 100 %  Physical Exam  Constitutional:       General: He is awake. He is not in acute distress.     Appearance: Normal appearance. He is well-developed. He is not ill-appearing or toxic-appearing.   HENT:      Head: Atraumatic.   Eyes:      General: No scleral icterus.     Pupils: Pupils are equal, round, and reactive to light.   Cardiovascular:      Rate and Rhythm: Normal rate and regular rhythm.      Heart sounds: Normal heart sounds, S1 normal and S2 normal.   Pulmonary:      Effort: No respiratory distress.      Breath sounds: Normal breath sounds.   Abdominal:      General: Bowel sounds are normal.      Palpations: Abdomen is soft.      Tenderness: There is no abdominal tenderness.   Musculoskeletal:         General: No tenderness.      Comments: 1+ pitting edema bilateral pretibial regions, no erythema, no tenderness to palpation.   Skin:     General: Skin is warm.      Findings: No rash.   Neurological:      Mental Status: He is alert and oriented to person, place, and time.   Psychiatric:         Mood and Affect: Mood normal.         Speech: Speech normal.         Behavior: Behavior is cooperative.         ED Course      Procedures                     Results for orders placed or performed during the hospital encounter of 11/25/22   Comprehensive metabolic panel     Status: Abnormal   Result Value Ref Range    Sodium 136 136 - 145 mmol/L    Potassium 5.1 3.4 - 5.3 mmol/L    Chloride 106 98 - 107 mmol/L     Carbon Dioxide (CO2) 19 (L) 22 - 29 mmol/L    Anion Gap 11 7 - 15 mmol/L    Urea Nitrogen 47.9 (H) 8.0 - 23.0 mg/dL    Creatinine 2.17 (H) 0.67 - 1.17 mg/dL    Calcium 8.0 (L) 8.8 - 10.2 mg/dL    Glucose 118 (H) 70 - 99 mg/dL    Alkaline Phosphatase 172 (H) 40 - 129 U/L    AST 50 10 - 50 U/L    ALT 89 (H) 10 - 50 U/L    Protein Total 5.6 (L) 6.4 - 8.3 g/dL    Albumin 3.4 (L) 3.5 - 5.2 g/dL    Bilirubin Total 0.6 <=1.2 mg/dL    GFR Estimate 31 (L) >60 mL/min/1.73m2   Asymptomatic COVID-19 Virus (Coronavirus) by PCR Nasopharyngeal     Status: Normal    Specimen: Nasopharyngeal; Swab   Result Value Ref Range    SARS CoV2 PCR Negative Negative    Narrative    Testing was performed using the Xpert Xpress SARS-CoV-2 Assay on the Cepheid Gene-Xpert Instrument Systems. Additional information about this Emergency Use Authorization (EUA) assay can be found via the Lab Guide. This test should be ordered for the detection of SARS-CoV-2 in individuals who meet SARS-CoV-2 clinical and/or epidemiological criteria as well as from individuals without symptoms or other reasons to suspect COVID-19. Test performance for asymptomatic patients has only been established in anterior nasal swab specimens. This test is for in vitro diagnostic use under the FDA EUA for laboratories certified under CLIA to perform high complexity testing. This test has not been FDA cleared or approved. A negative result does not rule out the presence of PCR inhibitors in the specimen or target RNA concentration below the limit of detection for the assay. The possibility of a false negative should be considered if the patient's recent exposure or clinical presentation suggests COVID-19. This test was validated by [facility] [employer] These Laboratories are certified under the Clinical Laboratory Improvement Amendments (CLIA) as qualified to perform high complexity testing.     CBC with platelets and differential     Status: Abnormal   Result Value  Ref Range    WBC Count 2.5 (L) 4.0 - 11.0 10e3/uL    RBC Count 2.56 (L) 4.40 - 5.90 10e6/uL    Hemoglobin 7.5 (L) 13.3 - 17.7 g/dL    Hematocrit 24.2 (L) 40.0 - 53.0 %    MCV 95 78 - 100 fL    MCH 29.3 26.5 - 33.0 pg    MCHC 31.0 (L) 31.5 - 36.5 g/dL    RDW 16.6 (H) 10.0 - 15.0 %    Platelet Count 51 (L) 150 - 450 10e3/uL   Manual Differential     Status: None   Result Value Ref Range    % Neutrophils 67 %    % Lymphocytes 30 %    % Monocytes 3 %    % Eosinophils 0 %    % Basophils 0 %    Absolute Neutrophils 1.7 1.6 - 8.3 10e3/uL    Absolute Lymphocytes 0.8 0.8 - 5.3 10e3/uL    Absolute Monocytes 0.1 0.0 - 1.3 10e3/uL    Absolute Eosinophils 0.0 0.0 - 0.7 10e3/uL    Absolute Basophils 0.0 0.0 - 0.2 10e3/uL    RBC Morphology Confirmed RBC Indices     Platelet Assessment  Automated Count Confirmed. Platelet morphology is normal.     Automated Count Confirmed. Platelet morphology is normal.   CBC with platelets differential     Status: Abnormal    Narrative    The following orders were created for panel order CBC with platelets differential.  Procedure                               Abnormality         Status                     ---------                               -----------         ------                     CBC with platelets and d...[[id number]]  Abnormal            Final result               Manual Differential[[id number]]                              Final result                 Please view results for these tests on the individual orders.   Results for orders placed or performed in visit on 11/25/22   CK total     Status: Normal   Result Value Ref Range     39 - 308 U/L   Comprehensive metabolic panel     Status: Abnormal   Result Value Ref Range    Sodium 137 136 - 145 mmol/L    Potassium 4.8 3.4 - 5.3 mmol/L    Chloride 104 98 - 107 mmol/L    Carbon Dioxide (CO2) 20 (L) 22 - 29 mmol/L    Anion Gap 13 7 - 15 mmol/L    Urea Nitrogen 48.7 (H) 8.0 - 23.0 mg/dL    Creatinine 2.40 (H) 0.67 - 1.17 mg/dL     Calcium 8.5 (L) 8.8 - 10.2 mg/dL    Glucose 125 (H) 70 - 99 mg/dL    Alkaline Phosphatase 193 (H) 40 - 129 U/L    AST 51 (H) 10 - 50 U/L    ALT 90 (H) 10 - 50 U/L    Protein Total 6.2 (L) 6.4 - 8.3 g/dL    Albumin 3.5 3.5 - 5.2 g/dL    Bilirubin Total 0.6 <=1.2 mg/dL    GFR Estimate 28 (L) >60 mL/min/1.73m2   Magnesium     Status: Normal   Result Value Ref Range    Magnesium 2.3 1.7 - 2.3 mg/dL   Phosphorus     Status: Abnormal   Result Value Ref Range    Phosphorus 4.9 (H) 2.5 - 4.5 mg/dL   Prepare red blood cells (unit)     Status: None (Preliminary result)   Result Value Ref Range    Blood Component Type Red Blood Cells     Product Code F9226Z64     Unit Status Not used     Unit Number [id number]     CROSSMATCH Compatible     CODING SYSTEM [id number]     UNIT ABO/RH O+     UNIT TYPE ISBT 5100    Prepare pheresed platelets (unit)     Status: None (Preliminary result)   Result Value Ref Range    Blood Component Type Platelets     Product Code I8100E66     Unit Status Released     Unit Number [id number]     CODING SYSTEM GASF945     UNIT ABO/RH A+     UNIT TYPE ISBT 6200      Medications   0.9% sodium chloride BOLUS (1,000 mLs Intravenous New Bag 11/25/22 4577)     Followed by   sodium chloride 0.9% infusion (has no administration in time range)        Assessments & Plan (with Medical Decision Making)   Ifrah Huitron is a 72 year old male who presents emergency department with referral from outpatient oncology service for increasing creatinine, thought to be a medication side effect.    I have reviewed the nursing notes. I have reviewed the findings, diagnosis, plan and need for follow up with the patient.    New Prescriptions    No medications on file       Final diagnoses:   Acute renal failure, unspecified acute renal failure type (H)       --  Maximino Doss MD PhD  [facility] EMERGENCY DEPARTMENT  11/25/2022     Maximino Doss MD  11/25/22 0447

## 2022-11-26 NOTE — PLAN OF CARE
Goal Outcome Evaluation:    [id number]    BP (!) 168/90 (BP Location: Right arm)   Pulse 73   Temp 97.4  F (36.3  C) (Oral)   Resp 16   Wt 70.8 kg (156 lb)   SpO2 99%   BMI 24.43 kg/m      Reason for admission: Presented from his due to worsening renal function.  Activity: UAL  Pain: Pt reports mild pain 1/10 to lower left back. Declining PRN pain medicine.   Neuro: AxOx4. Neuros intact. Pleasant.   Cardiac: /90. Afebrile. RRR.   Respiratory: NLB on RA. O2 sats WDL.   GI/: Voiding spontaneously. LBM 11/24.   Diet: Regular diet.   Lines: R chest port intact, infusing MIVF. Site WDL.   Wounds: Scattered bruising on bilateral forearms, hands. Small scabs noted on bilateral shins.   Labs/imaging: Reviewed. See chart.       Continue to monitor and follow POC

## 2022-11-26 NOTE — PLAN OF CARE
0700- 1900    BP (!) 177/94 (BP Location: Left arm)   Pulse 76   Temp 97.5  F (36.4  C) (Oral)   Resp 18   Wt 70.7 kg (155 lb 14.4 oz)   SpO2 99%   BMI 24.42 kg/m      Hypertensive OVSS on RA, Afebrile. Denies pain, SOB. PRN Zofran x1 and Compazine x1 for nausea- resolved. Hgb- 6.8- 1 unit of RBCs given and tolerated well. Hgb recheck was 8.5. Creatinine trending down. Port HL and de-accessed prior to discharge. Edema in BLE unchanged. Encouraged oral intake and ambulation. Continue POC.     Discharge  D: Orders for discharge and outpatient medications written.  I: Home medications and return to clinic schedule reviewed with patient. Discharge instructions and parameters for calling Health Care Provider reviewed. Patient left at 1800 accompanied by Self.   A: Patient/family verbalized understanding and was ready for discharge.   P: Patient instructed to  medications in Pharmacy. Follow up as scheduled 11/28.

## 2022-11-26 NOTE — DISCHARGE SUMMARY
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   Follow closely with the office for lab results and directions on   medications.     Appointments on [place] and/or [facility] (with [facility] or [facility]   provider or service). Call [phone] if you haven't heard regarding   these appointments within 7 days of discharge.          - Follow labs to decide timing to resume potassium, phosphorous, and NSAID.     Unresulted Labs Ordered in the Past 30 Days of this Admission     Date and Time Order Name Status Description    11/25/2022 10:45 AM PREPARE PHERESED PLATELETS (UNIT) Preliminary     11/25/2022 10:45 AM PREPARE RED BLOOD CELLS (UNIT) Preliminary       Oncology to see this coming week.     Discharge Disposition   Discharged to home  Condition at discharge: Stable    Hospital Course   Ifrah Huitron is a 72 year old male admitted on 11/25/2022. He has a past medical history of spindle cell carcinoma, pituitary macroadenoma s/p resection, adrenal insufficiency, hypothyroidism, GERD, kidney stones, DJD, rosacea, anxiety and depression. He was sent from  his clinic due to worsening renal function.  He is currently receiving chemotherapy and has had ongoing nausea, no emesis. Had loose stool for about 5 days, but improved yesterday and today. Denies fevers, cough, chills, or shortness of breath.    # HUSSEIN, likely pre-renal  GFR 28 and Cr. 2.4 at clinic. Slightly improved after fluids with last check GFR 31 and Cr. 2.17. November 15, 2022 shows GFR 66 and Cr. 1.16. Received 1000 ml NS in clinic and 1000 ml in ED  - NS 75 ml/hr given throughout the day with ongoing improvement. He felt confident that he could continue oral hydration at home.   - Discussed importance of adequate oral hydration and need to seek care if unable to achieve.   - Avoid nephrotoxic medications. Advised to hold home NSAID after discharge until follow-up with oncology and review of new lab results.     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   History began in 2021 when he sought medical care for back pain and found to have lung mets, biopsy showed spindle cell carcinoma.   - Oncology consulted today; oncology follows closely outpatient and is okay with following Monday labs closely if otherwise appropriate for evening discharge.      # Adrenal insufficiency  # Pituitary, macroadenoma s/p resection  # Hypothyroidism  - PTA cortef and synthroid (full dose synthroid everyday except Friday 1/2 dose)     # Pancytopenia  Hgb 7.5, plts 51, and WBC 2.5. Currently undergoing chemotherapy  - Trend CBC  - RBC transfusion given 11/26 for hgb <7mg/dL. He tolerated this well and reported improved energy.   - Discussed ongoing low platelets and need to seek medical care if bleeding.      # Elevated LFT's  Per oncology notes, likely from treatment and currently monitoring. ALT 89 and AST 50. This is improved from previous levels of  and AST 62 2 days ago.  - Oncology to continue to follow outpatient.      # Left mid back pain  Pain at site of tumor. Started on  Celebrex outpatient  - Hold Celebrex until further notice from oncology  - tylenol PRN     # Mucositis/Thrush  -Continue Doxepin PRN for mucositis  -Nystatin 4 times daily for 10 days for thrush (8 more days)    # GERD  - Continue Omeprazole 40mg daily    # Anxiety  # Depression  - Continue lexapro    # Rosacea  Symptoms exacerbated by chemotherapy  - PRN metronidazole gel    Consultations This Hospital Stay   ONCOLOGY ADULT IP CONSULT    Code Status   Full Code    Time Spent on this Encounter   I, Primo Vora MD, personally saw the patient today and spent greater than 30 minutes discharging this patient.       Primo Vora MD  [facility] [address] [place]  [address] [address]  Phone: [phone]  ______________________________________________________________________    Physical Exam   Vital Signs: Temp: 97.5  F (36.4  C) Temp src: Oral BP: (!) 177/94 Pulse: 76   Resp: 18 SpO2: 99 % O2 Device: None (Room air)    Weight: 155 lbs 14.4 oz  Physical Exam  Vitals reviewed.   Constitutional:       General: He is not in acute distress.     Appearance: He is not toxic-appearing.      Comments: Sitting up in hospital chair   HENT:      Head: Atraumatic.      Right Ear: External ear normal.      Left Ear: External ear normal.      Nose: Nose normal.      Mouth/Throat:      Mouth: Mucous membranes are moist.   Eyes:      General:         Right eye: No discharge.         Left eye: No discharge.      Conjunctiva/sclera: Conjunctivae normal.   Cardiovascular:      Rate and Rhythm: Normal rate and regular rhythm.      Pulses: Normal pulses.      Heart sounds: No murmur heard.     Comments: Port in place right chest  Pulmonary:      Effort: Pulmonary effort is normal. No respiratory distress.      Breath sounds: No wheezing or rales.   Abdominal:      General: Bowel sounds are normal. There is no distension.      Palpations: Abdomen is soft.      Tenderness: There is no abdominal tenderness.  There is no guarding.   Musculoskeletal:         General: No tenderness.      Cervical back: Neck supple.      Right lower leg: No edema.      Left lower leg: No edema.   Skin:     General: Skin is warm.      Capillary Refill: Capillary refill takes less than 2 seconds.      Findings: No rash.   Neurological:      General: No focal deficit present.   Psychiatric:         Behavior: Behavior normal.         Thought Content: Thought content normal.       Primary Care Physician   Sukhdev Kohler    Discharge Orders      Basic metabolic panel     Reason for your hospital stay    Dear Ifrah Huitron    You were hospitalized at [facility] with acute kidney injury and anemia and treated with IV hydration and blood transfusion.  Over your hospitalization your kidney function and hemoglobin improved  If you continue to stay hydrated and follow closely with your oncologist, you should continue to improve. If you develop fever, shortness of breath, light headedness, chest pain or bleeding, please seek medical attention.    We are suggesting the following medication changes:  - Hold potassium, phosphorous, and celcoxib until you have discussed Monday lab results with your regular medical provider.     Please get the following tests done:  - CBC and BMP on Monday    Please set up an appointment with:  - Keep all oncology appointments as scheduled.     It was a pleasure meeting with you today. Thank you for allowing me and my team the privilege of caring for you today. You are the reason we are here, and I truly hope we provided you with the excellent service you deserve. Please let us know if there is anything else we can do for you so that we can be sure you are leaving satisfied with your care experience.      Take care!  Primo Vora MD  Department of Medicine  [facility]     Activity    Your activity upon discharge: activity as tolerated     Adult [facility] Follow-up and  recommended labs and tests    Keep infusion and lab appointment with oncology office on Monday 11/28. Follow closely with the office for lab results and directions on medications.     Appointments on [place] and/or [facility] (with [facility] or [facility] provider or service). Call [phone] if you haven't heard regarding these appointments within 7 days of discharge.     Diet    Follow this diet upon discharge: No medical dietary restrictions. Please avoid dehydration.     Check Out Appointment Request    Please Schedule for IV fluid infusion on 11/28/2022 with labs prior CBC, BMP.  Recently discharged. Need IVF support and lab check pending follow up appointment on 12/05.       Significant Results and Procedures   Most Recent 3 CBC's:Recent Labs   Lab Test 11/26/22  0555 11/25/22  1706 11/23/22  1429   WBC 1.9* 2.5* 6.0   HGB 6.8* 7.5* 8.4*   MCV 97 95 94   PLT 44* 51* 74*     Most Recent 3 BMP's:Recent Labs   Lab Test 11/26/22  1420 11/26/22  0555 11/25/22  1706    137 136   POTASSIUM 4.5 5.0 5.1   CHLORIDE 108* 109* 106   CO2 17* 19* 19*   BUN 39.9* 42.2* 47.9*   CR 1.83* 1.92* 2.17*   ANIONGAP 13 9 11   COTY 7.7* 7.8* 8.0*   GLC 97 101* 118*   ,   Results for orders placed or performed during the hospital encounter of 11/07/22   CT Chest/Abdomen/Pelvis w Contrast    Narrative    CT CHEST/ABDOMEN/PELVIS WITH CONTRAST November 7, 2022 9:35 AM    CLINICAL HISTORY: Assess for disease progression of known sarcoma.  Spindle cell sarcoma (H).    TECHNIQUE: CT scan of the chest, abdomen, and pelvis was performed  following injection of IV contrast. Multiplanar reformats were  obtained. Dose reduction techniques were used.   CONTRAST: 74mL Isovue-370.    COMPARISON: 10/17/2022, 8/8/2022, 5/6/2022.    FINDINGS:   LUNGS AND PLEURA: Previous described tiny 2 mm left lower lobe  pulmonary nodule is unchanged. A couple of benign punctate calcified  granulomas are also noted. No new or enlarging pulmonary  nodules.  Previously described medial left upper lobe/mediastinal mass is  discussed below. No airspace consolidation. There is trace pleural  fluid and mild pleural thickening on the left at the base. As  mentioned below, there is a large lobulated low-density splenic mass  which abuts the left hemidiaphragm. Central airways are patent.    MEDIASTINUM/AXILLAE: There is a centrally low dense/necrotic anterior  mediastinal/anteromedial left upper lobe mass measuring 3.6 x 3.0 cm  (2-59), previously 3.2 x 2.8 cm. A small suspected pericardial lymph  node in the right cardiophrenic recess measures 7 mm (2-84),  previously 7 mm. No new or enlarging thoracic lymph nodes. Heart size  normal. No pericardial effusion. Thoracic aorta is normal in course  and caliber. Mild coronary artery atherosclerosis is present. A right  chest wall Port-A-Cath is present with the tip in the upper right  atrium just below the cavoatrial junction.    HEPATOBILIARY: Several small subcentimeter low-density hepatic foci  are similar in size and number in comparison. These are too small to  characterize, but statistically favor cysts or hemangiomas and do not  require specific follow-up. Rather, attention on surveillance imaging  is recommended. No signs of cholelithiasis, acute cholecystitis, or  bile duct dilation.    PANCREAS: There is a small low-density observation in the tail of the  pancreas with a mean diameter of 7 mm (2-141), which may represent  focal fatty invagination versus a small cystic lesion. This is  incompletely characterized on this study, but can be monitored on  future surveillance imaging. No specific follow-up is recommended. No  inflammatory changes in the pancreas. No dilation of the pancreatic  duct.    SPLEEN: There is a large lobulated low-density mass involving the  posterior aspect of the spleen extending beyond the splenic capsule to  involve the fat posterior to the spleen as well as the posterior  aspect the  left hemidiaphragm. This mass measures 8.9 x 7.9 cm  (2-126), previously 8.0 x 7.4 cm.    ADRENAL GLANDS: Normal.    KIDNEYS/BLADDER: There are a couple small subcentimeter bilateral  low-density renal cortical foci which are similar to comparison and  statistically favor cysts. No specific follow-up is recommended.  Rather, attention on surveillance imaging is suggested. No  nephrolithiasis or hydronephrosis. Urinary bladder is  normal-appearing.    BOWEL: Large and small bowel loops are nonobstructed and  noninflammatory. Colonic diverticulosis is present without evidence of  diverticulitis. Appendix is normal-appearing.    PELVIC ORGANS: The prostate gland is mildly enlarged.    ADDITIONAL FINDINGS: There is mild atherosclerosis of the abdominal  aorta and iliac arteries. No aneurysmal dilation. No enlarged  abdominal or pelvic lymph nodes.    MUSCULOSKELETAL: A couple small nonaggressive appearing sclerotic foci  involving the right femoral neck and left iliac wing are similar to  comparison. Mixed lytic and cirrhotic changes of the right iliac bone  near the SI joint are unchanged as well. Multilevel degenerative  changes of the spine. No acute osseous abnormality.      Impression    IMPRESSION:  1.  Slight interval enlargement of previously seen anterior  mediastinal/anteromedial left upper lobe and splenic masses.  2.  Multiple nonacute findings as above.    ROBIN SLATER MD      SYSTEM ID:  [id number]     Discharge Medications   Current Discharge Medication List      CONTINUE these medications which have CHANGED    Details   celecoxib (CELEBREX) 200 MG capsule Take 1 capsule (200 mg) by mouth 2 times daily    Associated Diagnoses: Cancer associated pain      phosphorus tablet 250 mg (PHOSPHA 250 NEUTRAL) 250 MG per tablet Take 1 tablet (250 mg) by mouth 2 times daily  Qty: 60 tablet, Refills: 3    Associated Diagnoses: Hypophosphatemia      potassium chloride ER (KLOR-CON M) 20 MEQ CR tablet Take 1 tablet  "(20 mEq) by mouth daily  Qty: 90 tablet, Refills: 3    Associated Diagnoses: Hypokalemia         CONTINUE these medications which have NOT CHANGED    Details   clotrimazole (MYCELEX) 10 MG lozenge Place 1 lozenge (10 mg) inside cheek 5 times daily  Qty: 90 lozenge, Refills: 1    Associated Diagnoses: Thrush; Oropharyngeal candidiasis; Dysphonia; Vocal fold paralysis, left; Muscle tension dysphonia; Spindle cell sarcoma (H)      doxepin (SINEQUAN) 10 MG/ML (HIGH CONC) solution Take 2.5 mLs (25 mg) by mouth 2 times daily as needed (rinse for mucositis) Dilute the 2.5 mL of doxepin to 5 ml total in sterile or distilled water.  Qty: 118 mL, Refills: 0    Associated Diagnoses: Mucositis      escitalopram (LEXAPRO) 10 MG tablet Take 1 tablet (10 mg) by mouth daily  Qty: 90 tablet, Refills: 3    Associated Diagnoses: Chemotherapy-induced neutropenia (H); Spindle cell sarcoma (H)      guaiFENesin-codeine (GUAIFENESIN AC) 100-10 MG/5ML syrup Take 10 mLs by mouth every 4 hours as needed for cough  Qty: 118 mL, Refills: 0    Associated Diagnoses: Vocal fold paralysis, left; Dysphonia; Muscle tension dysphonia; Mesothelioma, malignant (H); Cough      hydrocortisone (CORTEF) 10 MG tablet Take 20 mg in the morning and 10 mg in the afternoon  Qty: 270 tablet, Refills: 3    Associated Diagnoses: Hypopituitarism (H)      Insulin Syringe-Needle U-100 27.5G X 5/8\" 2 ML MISC 1 each daily as needed (with solucortef for adrenal crisis)  Qty: 10 each, Refills: 1    Associated Diagnoses: Adrenal insufficiency (H)      levothyroxine (SYNTHROID/LEVOTHROID) 75 MCG tablet Take 1 tablet (75 mcg) by mouth every morning  Qty: 90 tablet, Refills: 3    Associated Diagnoses: Hypopituitarism (H)      Menthol-Methyl Salicylate (DALIA MALIK GREASELESS) cream Apply topically 2 times daily      metroNIDAZOLE (METROGEL) 1 % external gel Apply topically daily Try stronger dose due to increased symptoms after chemo.  Qty: 30 g, Refills: 0    Comments: Overdue " for Derm appt to renew this  rx: [phone] to schedule, call asap-booking into 2023.  Associated Diagnoses: Rosacea      nystatin (MYCOSTATIN) 296368 UNIT/ML suspension Take 5 mLs (500,000 Units) by mouth 4 times daily for 10 days Swish and spit  Qty: 200 mL, Refills: 0    Associated Diagnoses: Thrush      omeprazole (PRILOSEC) 20 MG DR capsule Take 1 capsule (20 mg) by mouth 2 times daily  Qty: 60 capsule, Refills: 1      ondansetron (ZOFRAN) 4 MG tablet Take 1-2 tablets (4-8 mg) by mouth every 8 hours as needed for nausea  Qty: 60 tablet, Refills: 3    Associated Diagnoses: Chemotherapy-induced nausea      prochlorperazine (COMPAZINE) 5 MG tablet Take 1 tablet (5 mg) by mouth every 6 hours as needed for nausea or vomiting . Caution: causes sedation.  Qty: 30 tablet, Refills: 1    Associated Diagnoses: Chemotherapy-induced nausea      SUMAtriptan (IMITREX) 50 MG tablet Take 1 tablet (50 mg) by mouth at onset of headache for migraine May repeat in 2 hours. Max 4 tablets/24 hours.  Qty: 10 tablet, Refills: 3    Associated Diagnoses: Migraine with aura and without status migrainosus, not intractable      triamcinolone (KENALOG) 0.1 % external cream Apply topically 2 times daily to bothersome skin areas.  Qty: 30 g, Refills: 3    Associated Diagnoses: Drug rash           Allergies   Allergies   Allergen Reactions     Penicillins Hives and Swelling

## 2022-11-26 NOTE — UTILIZATION REVIEW
Admission Status; Secondary Review Determination       Under the authority of the Utilization Management Committee, the utilization review process indicated a secondary review on the above patient. The review outcome is based on review of the medical records, discussions with staff, and applying clinical experience noted on the date of the review.     (x) Inpatient Status Appropriate - This patient's medical care is consistent with medical management for inpatient care and reasonable inpatient medical practice.     RATIONALE FOR DETERMINATION      Patient requires inpatient admission versus short stay observation or outpatient treatment for the following reasons:    72-year-old man with the following medical problems: Spindle cell carcinoma under chemotherapy,  pituitary macroadenoma s/p resection, adrenal insufficiency, hypothyroidism, GERD, kidney stones, DJD, rosacea, anxiety and depression.  He was sent from oncology office because of increased creatinine to 2.4 with a GFR of 28.  10 days ago his GFR was 66 and creatinine 1.16.  He is currently receiving chemotherapy and has had ongoing nausea, no emesis. Had loose stool for about 5 days, but improved yesterday and today. Denies fevers, cough, chills, or shortness of breath.  The creatinine improved to 1.92 with IV fluids and GFR improved to 36.  The second day of the hospital stay, the the platelets and hemoglobin decreased, hemoglobin dropped from 7.5 to 6.8 for which she requires transfusion and close monitoring of the hemoglobin.     The expected length of stay at the time of admission was more than 2 nights because of the severity of illness, intensity of service provided, and risk for adverse outcome. Inpatient admission is appropriate.     This is a conditional review for a Medicare patient, at the time of this review patient is anticipated to require at least 1 more night of hospital care; however, if plan changes and discharges before 2 midnights  please send for post discharge review.      This document was produced using voice recognition software       The information on this document is developed by the utilization review team in order for the business office to ensure compliance. This only denotes the appropriateness of proper admission status and does not reflect the quality of care rendered.   The definitions of Inpatient Status and Observation Status used in making the determination above are those provided in the CMS Coverage Manual, Chapter 1 and Chapter 6, section 70.4.   Sincerely,   CHERRY SOLO MD   Utilization Review  Physician Advisor  [facility]

## 2022-11-26 NOTE — PROGRESS NOTES
Nursing Focus: Admission    D: Patient admitted/transferred from ED via hospital transport for HUSSEIN.      I: Upon arrival to the unit patient was oriented to room, unit, and call light. Patient s height, weight, and vital signs were obtained. Allergies reviewed and allergy band applied. MD notified of patient s arrival on the unit. Adult AVS completed. Head to toe assessment completed. Education assessment completed. Care plan initiated.     A: Vital signs stable upon admission. Patient rates pain at 1/10. Two RN skin assessment completed: Yes. Second RN was Marcia Jay. Significant Skin Findings include bruising on bilateral forearms, hands. Small scabbing on bilateral shins. [facility] Nurse Consult Ordered: No. Bed Algorithm can be found in PCS flow sheets (Support Surface Algorithm) and on IP [facility] NURSE RESOURCE TAB, was this used during this assessment? No. Was a pulsate mattress ordered: No.     P: Continue to monitor and intervene as needed. Continue with plan of care. Notify MD with any concerns or changes in patient status.

## 2022-11-26 NOTE — PLAN OF CARE
[id number]:     Pt A&Ox4. HTN with OVSS on RA. No complaints of N/V or SOB. Complained of back pain 3/10, lidocaine patch in place. PRN doxepin given x1 for mucusoitis. NS running at 75 mL/hr. Up independently, no BM this shift, voiding spontaneously. Able to make needs known. Continue with POC.      Authored by Resident/PA/NP

## 2022-11-26 NOTE — PROVIDER NOTIFICATION
"Pg'd Primo Vora MD    \"Hgb came back 6.8. Should we replace with 1 unit RBCs? If so, can you order. Thanks!\"    \"Could we get PRN Compazine and Ativan for nausea? Zofran not effective and pt verbalized these two to be helpful \"    \"FYI: Pt's BP is consistently high, 171/99; Recheck was 170/104. Pt is asymptomatic otherwise.\"  "

## 2022-11-28 NOTE — PROGRESS NOTES
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, subdiaphragmatic mets  Adrenal insufficiency  Discharge:   Discharge Date: 11/26/22  Discharge Diagnosis: Acute kidney injury due to pre-renal injury, improved with IV hydration  Acute on chronic anemia related to malignancy and related treatments, improved s/p RBC transfusion  Thrombocytopenia, pancytopenia due to chemotherapy  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 He is currently undergoing chemotherapy and renal function has been followed closely. He receives additional fluids in clinic due to chemo and on-going nausea. He was found to have lung mets in 2021, diagnosed with spindle cell carcinoma and has followed with oncology. He has had some changes in his chemo regimen during the course of his treatment. He is aware of current meds and treatment plans and is diligent about medication adherence. He has remained independent despite felling more fatigued with chemo.     ASSESSMENT      Discharge Assessment  How are you doing now that you are home?: \"Doing okay\"  How are your symptoms? (Red Flag symptoms escalate to triage hotline per guidelines): Improved  Do you feel your condition is stable enough to be safe at home until your provider visit?: Yes  Does the patient have " their discharge instructions? : Yes  Does the patient have questions regarding their discharge instructions? : No  Were you started on any new medications or were there changes to any of your previous medications? : Yes  Does the patient have all of their medications?: Yes  Do you have questions regarding any of your medications? : Yes (see comment)  Do you have all of your needed medical supplies or equipment (DME)?  (i.e. oxygen tank, CPAP, cane, etc.): Yes  Discharge follow-up appointment scheduled within 14 calendar days? : No  Is patient agreeable to assistance with scheduling? : No    Post-op ([facility] Only)  If the patient had a surgery or procedure, do they have any questions for a nurse?: No    PLAN                                                      Outpatient Plan:    Keep infusion and lab appointment with oncology office on Monday 11/28.   Follow closely with the office for lab results and directions on   medications.      Appointments on [facility] and/or [facility] (with [facility] or [facility]   provider or service). Call [phone] if you haven't heard regarding   these appointments within 7 days of discharge.          - Follow labs to decide timing to resume potassium, phosphorous, and NSAID.    Future Appointments   Date Time Provider Department Center   12/1/2022 10:15 AM [facility]   12/1/2022 10:30 AM [facility]CER INFUSION NURSE [facility]   12/5/2022  3:30 PM Maynor Rios PA [facility]   12/6/2022 10:45 AM [facility]   12/6/2022 11:00 AM [facility] NURSE [facility]   12/7/2022 12:00 PM [facility] NURSE [facility]ON[facility]   12/9/2022  9:45 AM [facility]   12/9/2022 10:00 AM [facility] INFUSION NURSE [facility] ARREDONDO   12/14/2022  9:15 AM [facility]   12/14/2022  9:30 AM [facility] INF[facility] NURSE [facility]REDONDO   12/22/2022 10:45 AM [facility]  [facility]   12/22/2022 11:00 AM [facility]FUSION NURSE [facility]   12/27/2022 10:15 AM [facility]   12/27/2022 10:30 AM [facility] INFUSION NURSE [facility]   12/28/2022 12:00 PM [facility] NURSE [facility]   12/30/2022  9:15 AM [facility]   12/30/2022  9:30 AM [facility]ION NURSE [facility]   1/4/2023  9:45 AM [facility] TR[facility] LAB [facility]   1/4/2023 10:00 AM [facility] INF[facility] NURSE [facility]   1/9/2023 11:00 AM Elpidio Tao MD [facility]   1/11/2023  9:45 AM [facility] TR[facility] LAB [facility]   1/11/2023 10:00 AM [facility] NURSE [facility]   3/24/2023  1:00 PM Sherry Esquivel MD [facility]         For any urgent concerns, please contact our 24 hour nurse triage line: [phone] ([phone])         DEVIKA Plummer  [phone]  Connected Care Resource [facility]

## 2022-11-29 NOTE — PROGRESS NOTES
Infusion Nursing Note:  Ifrah Huitron presents today for IVF and antiemetics.    Patient seen by provider today: No   present during visit today: Not Applicable.    Note: Pt reports nausea since last chemo, feeling weak and rundown. Not taking in PO due to nausea. Pt denies vomiting, no diarrhea.        Intravenous Access:  Implanted Port.    Treatment Conditions:  Not Applicable.    Post Infusion Assessment:  Patient tolerated infusion without incident.  Blood return noted pre and post infusion.  Site patent and intact, free from redness, edema or discomfort.  No evidence of extravasations.  Access left in for fluids tomorrow and repeat labs tomorrow.     Discharge Plan:   Discharge instructions reviewed with: Patient.  Patient and/or family verbalized understanding of discharge instructions and all questions answered.  Patient discharged in stable condition accompanied by: self.  Departure Mode: Ambulatory.      Sarahi Astudillo RN

## 2022-11-29 NOTE — PROGRESS NOTES
11/29/22    Called patient after reviewing his labs. He is not feeling well. He is very fatigued. He is not eating much. He is sleeping most of the day. He is weak. Nausea is persistent with Zofran and Compazine plus Zyprexa at night. He is having pain in his back/cancer spot but now he is also having pain to his left abdomen like a band. The pain can be very sharp. He did have a fever 100.9 last night after laying under a quilt. Hasn't checked today. His wife doesn't think he looks jaundice. His R hand is swollen. Mouth is OK. No bleeding.     He denies fevers at the moment and abdominal pain is manageable. He does not want to go to ED right now. We discussed strict instructions to go to ED with worsening abdominal pain, fever, chills.     Will repeat CBC and CMP tomorrow in addition to drawing blood cultures. Will also ask to schedule US liver and R arm tomorrow.      Maynor Rios PA-C

## 2022-11-30 NOTE — PROGRESS NOTES
Infusion Nursing Note:  Ifrah Huitron presents today for IVF and labs.    Patient seen by provider today: No   present during visit today: Not Applicable.    Note: Pt reports he's feeling improvement today since yesterday. Less nausea but still having decreased oral intake.    Intravenous Access:  Implanted Port access from 11/29/22    Treatment Conditions:  Not Applicable.    Post Infusion Assessment:  Patient tolerated infusion without incident.  Blood return noted pre and post infusion.  Site patent and intact, free from redness, edema or discomfort.  No evidence of extravasations.     Discharge Plan:   Discharge instructions reviewed with: Patient.  Patient and/or family verbalized understanding of discharge instructions and all questions answered.  Patient discharged in stable condition accompanied by: self.  Departure Mode: Ambulatory.      Sarahi Astudillo RN

## 2022-11-30 NOTE — PROGRESS NOTES
PAC labs drawn and sent with BC from Port.Good blood return noted. Pt is afebrile but ANC is 0.4.    MWeeksRN

## 2022-11-30 NOTE — UTILIZATION REVIEW
Admission Status; Secondary Review Determination    No action to be taken. Please contact me on my Email : [email or web address] if you have any questions.    As part of the [place] Utilization review plan, a self-audit is done on Medicare inpatient admission with less than 2 midnights stay. The 2014 IPPS Final Rule allows outpatient billing in the event that a hospital determines that an inpatient admission was not medically necessary under utilization review process.     (x) Outpatient status would be Appropriate- Short Stay- Post discharge review.    RATIONALE FOR DETERMINATION  Ifrah Huitron is a 72 year old male admitted on 11/25/2022. He has a past medical history of spindle cell carcinoma, pituitary macroadenoma s/p resection, adrenal insufficiency, hypothyroidism, GERD, kidney stones, DJD, rosacea, anxiety and depression. He was sent from his clinic due to worsening renal function.  He is currently receiving chemotherapy and has had ongoing nausea, no emesis. Had loose stool for about 5 days, but improved yesterday and today.   His creatinine improved with IV rehydration      Patient was admitted and discharge after one night stay. Record was sent by  for a PA review. Based on the  severity of illness, intensity of service provided, expected LOS and risk for adverse outcome make the care appropriate for further outpatient/observation; however, doesn't meet criteria for hospital inpatient admission.       The information on this document is developed by the utilization review team in order for the business office to ensure compliance.  This only denotes the appropriateness of proper admission status and does not reflect the quality of care rendered.       The definitions of Inpatient Status and Observation Status used in making the determination above are those provided in the CMS Coverage Manual, Chapter 1 and Chapter 6, section 70.4.     Please cont me if you want to discuss further about this admission  episode.      Anali Perry MD, FACP, EDU  Medical Director - Utilization management  Staff Hospitalist  [facility]    Pager: [phone]

## 2022-11-30 NOTE — TELEPHONE ENCOUNTER
11/30/22    Called patient to review US reports and labs. His creat is improving. His LFTs are actually notably improved. Plt low but stable. Hgb is low but not level of transfusion yet. WBC/ANC improved and he is no longer neutropenic.     US Abdomen with nonspecific ductal dilation. Will reach out to GI though with improvement in labs reasonable to monitor. Will repeat CMP in 2 days (Friday) and continue to monitor for fevers or abdominal pain. Hold off on abx/G-CSF at this time given improvement.    US arm with superficial phlebitis. Will do warm compress and monitor for improvement. Cannot do anticoagulation with thrombocytopenia. Recheck CBC in 2 days as well.     Patient is feeling better overall. He is constipated and may need stool softener/laxative, though also notes he needs to eat more to have a bowel movement. He otherwise is doing OK.    Will continue to closely follow.    Maynor Rios PA-C

## 2022-12-02 NOTE — PROGRESS NOTES
Infusion Nursing Note:  Ifrah Huitron presents today for PAC fluids, one unit of PRBCs and antinausea meds.    Patient seen by provider today: No   present during visit today: Not Applicable.    Note: Paged GERALDO Mullins in regards to elevated BP.  Per Maynor, she ordered amlodipine for pt and it's at [facility] in [place], [place].  Pt okay to take 40 meq of potassium chloride at home today and then to continue taking 20 meq daily until informed to stop medication.  Wrote this information down for pt on AVS.  Pt verbalized understanding of information provided and has no questions or concerns at this time.     Intravenous Access:  Labs drawn without difficulty.  Implanted Port.    Treatment Conditions:  Lab Results   Component Value Date    HGB 7.5 (L) 12/02/2022    WBC 5.5 12/02/2022    ANEU 1.7 12/02/2022    ANEUTAUTO 7.5 11/15/2022    PLT 21 (LL) 12/02/2022      Lab Results   Component Value Date     12/02/2022    POTASSIUM 3.1 (L) 12/02/2022    MAG 1.8 11/29/2022    CR 1.53 (H) 12/02/2022    COTY 8.5 (L) 12/02/2022    BILITOTAL 1.3 (H) 12/02/2022    ALBUMIN 3.5 12/02/2022    ALT 63 (H) 12/02/2022    AST 45 12/02/2022     Results reviewed, labs MET treatment parameters, ok to proceed with treatment.  Blood transfusion consent signed 2/17/22    Post Infusion Assessment:  Patient tolerated infusion without incident.  Blood return noted pre and post infusion.  Site patent and intact, free from redness, edema or discomfort.  No evidence of extravasations.  Access discontinued per protocol.     Discharge Plan:   Patient discharged in stable condition accompanied by: self.  Departure Mode: Ambulatory.  Pt to return 12/6/22 at 10:45 am for PAC labs followed by possible blood transfusion and IV fluids.     Cathy Mann RN

## 2022-12-02 NOTE — PATIENT INSTRUCTIONS
Maynor Srivastava ordered amlodipine for your high blood pressures, this will be ready to be picked at [facility] in [place].      Take 40 meQ of potassium chloride today and tomorrow start taking one pill (20 mEq tablet) every day until told not to.      I hope you feel better after your IV bloods, antiemetics, and blood transfusion today.          Take Care,    Cathy Mann RN

## 2022-12-04 NOTE — PROGRESS NOTES
Ifrah is a 72 year old who is being evaluated via a billable video visit.      How would you like to obtain your AVS? MyChart  If the video visit is dropped, the invitation should be resent by: Send to e-mail at: [email or web address]  Will anyone else be joining your video visit? No     Amanda Morgan JENNIFER        Video-Visit Details    Video Start Time: 1:30pm    Type of service:  Video Visit    Video End Time:1:55pm    Originating Location (pt. Location): Home        Distant Location (provider location):  On-site    Platform used for Video Visit: [facility]    Oncology/Hematology Visit Note  Dec 5, 2022    Reason for Visit: Follow up of spindle cell sarcoma     History of Present Illness: Ifrah Huitron is a 72 year old male with PMH rosacea, pituitary macroadenoma s/p resection, GERD, kidney stones, DJD with metastatic spindle cell sarcoma. He presented to his PCP March 2021 with chest pain/left shoulder and back pain that led to imaging which revealed multiple lung mets. There were difficulties in getting diagnostic biopsy, eventually biopsy of mediastinal mass with spindle cell sarcoma. There was high PD-L1 expression (90%). Baseline imaging 6/21/21 with progression of lung mets and left-sided subdiaphragmatic mass, stable liver lesions. 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      He started Keytruda 6/21/21. CT 8/2/21 with positive response to treatment. CT 10/18/21 with mixed response- LUQ mass larger, anterior mediastinal and left anterior pleural mass smaller. Keytruda stopped.     Started on Doxil + Ifosfamide 10/26/21. Poorly tolerated with mucositis, nausea, poor oral intake. CT with stable to positive response.      Received C2 12/1/21 (delayed for tolerance issues) with 10% dose reduction.     Received C3 1/4/22 with same 10% dose reduction. He had issues with nausea inpatient along with recurrent thrush.      Received C4  2/2/22 with same 10% dose reduction. Had more significant neurotoxicity so only received 5.5 days. Symptoms resolved after stopping Ifosfamide.      CT 3/8/22 with positive response to treatment.      He was on Doxil alone but has been dealing with worsening mouth and skin toxicity and has required delays and dose reductions. CT 8/8/22 with disease progression.     Switched to Gemzar 8/10/22. Completed 3 cycles complicated by edema and cytopenias. CT with progression.    Received Trabectedin 11/15/22. Admitted with HUSSEIN. Complicated by cytopenias requiring transfusion, acute hepatic toxicity.     Interval History:  -Pain worse. Hasn't been doing Celebrex, back and front. All on the left.  -Statin on hold  -Nausea is better. Zofran (+compazine). No olanzapine.  -Still not eating well. Appetite, mucositis, thrush. Taste bad.   -Hydrating OK. +dizziness, palpitations, WALKER  -Blood transfusion helped for one day  -No fevers. No jaundice. Bowels OK. No bleeding.  -No leg swelling. R hand is still a little swollen.   -Already increased Hydrocortisone 30mg AM and 20mg PM    Current Outpatient Medications   Medication Sig Dispense Refill     amLODIPine (NORVASC) 5 MG tablet Take 1 tablet (5 mg) by mouth daily 30 tablet 3     celecoxib (CELEBREX) 200 MG capsule Take 1 capsule (200 mg) by mouth 2 times daily       clotrimazole (MYCELEX) 10 MG lozenge Place 1 lozenge (10 mg) inside cheek 5 times daily (Patient not taking: No sig reported) 90 lozenge 1     doxepin (SINEQUAN) 10 MG/ML (HIGH CONC) solution Take 2.5 mLs (25 mg) by mouth 2 times daily as needed (rinse for mucositis) Dilute the 2.5 mL of doxepin to 5 ml total in sterile or distilled water. 118 mL 0     escitalopram (LEXAPRO) 10 MG tablet Take 1 tablet (10 mg) by mouth daily 90 tablet 3     guaiFENesin-codeine (GUAIFENESIN AC) 100-10 MG/5ML syrup Take 10 mLs by mouth every 4 hours as needed for cough 118 mL 0     hydrocortisone (CORTEF) 10 MG tablet Take 20 mg in the  "morning and 10 mg in the afternoon 270 tablet 3     Insulin Syringe-Needle U-100 27.5G X 5/8\" 2 ML MISC 1 each daily as needed (with solucortef for adrenal crisis) 10 each 1     levothyroxine (SYNTHROID/LEVOTHROID) 75 MCG tablet Take 1 tablet (75 mcg) by mouth every morning 90 tablet 3     Menthol-Methyl Salicylate (DALIA MALIK GREASELESS) cream Apply topically 2 times daily       metroNIDAZOLE (METROGEL) 1 % external gel Apply topically daily Try stronger dose due to increased symptoms after chemo. 30 g 0     omeprazole (PRILOSEC) 20 MG DR capsule Take 1 capsule (20 mg) by mouth 2 times daily 60 capsule 1     ondansetron (ZOFRAN) 4 MG tablet Take 1-2 tablets (4-8 mg) by mouth every 8 hours as needed for nausea 60 tablet 3     phosphorus tablet 250 mg (PHOSPHA 250 NEUTRAL) 250 MG per tablet Take 1 tablet (250 mg) by mouth 2 times daily 60 tablet 3     potassium chloride ER (KLOR-CON M) 20 MEQ CR tablet Take 1 tablet (20 mEq) by mouth daily 90 tablet 3     prochlorperazine (COMPAZINE) 5 MG tablet Take 1 tablet (5 mg) by mouth every 6 hours as needed for nausea or vomiting . Caution: causes sedation. 30 tablet 1     SUMAtriptan (IMITREX) 50 MG tablet Take 1 tablet (50 mg) by mouth at onset of headache for migraine May repeat in 2 hours. Max 4 tablets/24 hours. (Patient taking differently: Take 50 mg by mouth at onset of headache for migraine May repeat in 2 hours. Max 4 tablets/24 hours. PRN) 10 tablet 3     triamcinolone (KENALOG) 0.1 % external cream Apply topically 2 times daily to bothersome skin areas. 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     Thyroid disease     removed pituitary gland     Past Surgical History:   Procedure Laterality Date     COLONOSCOPY N/A 12/17/2020    Procedure: COLONOSCOPY;  Surgeon: Ken Camacho MD;  Location: [facility]     ENT SURGERY       HERNIA REPAIR       " INSERT PORT VASCULAR ACCESS Right 1/28/2022    Procedure: INSERTION, VASCULAR ACCESS PORT;  Surgeon: Daniel Kinney MD;  Location: [facility] OR     IR CHEST PORT PLACEMENT > 5 YRS OF AGE  1/28/2022     LARYNGOSCOPY, EXCISE VOCAL CORD LESION MICROSCOPIC, COMBINED Left 07/01/2021    Procedure: MICROLARYNGOSCOPY, LEFT TRUE VOCAL CORD INJECTION WITH PROLARYN;  Surgeon: Kyree Bearden MD;  Location: [facility]     PHACOEMULSIFICATION WITH STANDARD INTRAOCULAR LENS IMPLANT Right 03/10/2021    Procedure: Cataract removal with implant.;  Surgeon: Jamir Mac MD;  Location: [facility]     PHACOEMULSIFICATION WITH STANDARD INTRAOCULAR LENS IMPLANT Left 04/05/2021    Procedure: Cataract removal with implant.;  Surgeon: Jamir Mac MD;  Location: [facility]     PICC DOUBLE LUMEN PLACEMENT Right 12/01/2021    5FR DL PICC, basilic vein. L-38cm, 1cm out.     PICC DOUBLE LUMEN PLACEMENT Right 01/04/2022    Right cephalic, 41 cm, 1 external length     PITUITARY EXCISION       tooth pulled 4/7  Right      Allergies   Allergen Reactions     Penicillins Hives and Swelling            Social History   Social History     Tobacco Use     Smoking status: Never     Smokeless tobacco: Never   Substance Use Topics     Alcohol use: Yes     Comment: rare     Drug use: Never      Past medical history and social history were reviewed.    Physical Examination:  There were no vitals taken for this visit.  Wt Readings from Last 10 Encounters:   11/26/22 70.7 kg (155 lb 14.4 oz)   11/23/22 68 kg (150 lb)   10/19/22 67.9 kg (149 lb 12.8 oz)   10/06/22 65 kg (143 lb 3.2 oz)   09/27/22 65.3 kg (144 lb)   09/18/22 61.2 kg (135 lb)   09/15/22 63.1 kg (139 lb 3.2 oz)   09/08/22 63.2 kg (139 lb 6.4 oz)   08/25/22 62.3 kg (137 lb 6.4 oz)   08/10/22 63.4 kg (139 lb 11.2 oz)     Video physical exam  General: Patient appears well in no acute distress.   Skin: No visualized rash or lesions on visualized skin  Eyes: EOMI, no erythema, sclera icterus or  discharge noted  Resp: Appears to be breathing comfortably without accessory muscle usage, speaking in full sentences, no cough  MSK: Appears to have normal range of motion based on visualized movements  Neurologic: No apparent tremors, facial movements symmetric  Psych: affect normal, alert and oriented    Laboratory Data:   Latest Reference Range & Units 12/02/22 08:09   Sodium 136 - 145 mmol/L 137   Potassium 3.4 - 5.3 mmol/L 3.1 (L)   Chloride 98 - 107 mmol/L 103   Carbon Dioxide (CO2) 22 - 29 mmol/L 27   Urea Nitrogen 8.0 - 23.0 mg/dL 32.6 (H)   Creatinine 0.67 - 1.17 mg/dL 1.53 (H)   GFR Estimate >60 mL/min/1.73m2 47 (L)   Calcium 8.8 - 10.2 mg/dL 8.5 (L)   Anion Gap 7 - 15 mmol/L 7   Phosphorus 2.5 - 4.5 mg/dL 2.1 (L)   Albumin 3.5 - 5.2 g/dL 3.5   Protein Total 6.4 - 8.3 g/dL 5.8 (L)   Alkaline Phosphatase 40 - 129 U/L 256 (H)   ALT 10 - 50 U/L 63 (H)   AST 10 - 50 U/L 45   Bilirubin Total <=1.2 mg/dL 1.3 (H)   Glucose 70 - 99 mg/dL 84   WBC 4.0 - 11.0 10e3/uL 5.5   Hemoglobin 13.3 - 17.7 g/dL 7.5 (L)   Hematocrit 40.0 - 53.0 % 23.4 (L)   Platelet Count 150 - 450 10e3/uL 21 (LL)   RBC Count 4.40 - 5.90 10e6/uL 2.56 (L)   MCV 78 - 100 fL 91   MCH 26.5 - 33.0 pg 29.3   MCHC 31.5 - 36.5 g/dL 32.1   RDW 10.0 - 15.0 % 16.6 (H)   % Neutrophils % 30   % Lymphocytes % 55   % Monocytes % 15   % Eosinophils % 0   % Basophils % 0   Absolute Basophils 0.0 - 0.2 10e3/uL 0.0   Absolute Neutrophil 1.6 - 8.3 10e3/uL 1.7   Absolute Lymphocytes 0.8 - 5.3 10e3/uL 3.0   Absolute Monocytes 0.0 - 1.3 10e3/uL 0.8   Absolute Eosinophils 0.0 - 0.7 10e3/uL 0.0   RBC Morphology  Confirmed RBC Indices   Platelet Morphology Automated Count Confirmed. Platelet morphology is normal.  Automated Count Confirmed. Platelet morphology is normal.   ABO/Rh(D)  O POS   Antibody Screen Negative  Negative   SPECIMEN EXPIRATION DATE  [id number]   (LL): Data is critically low  (L): Data is abnormally low  (H): Data is abnormally  high      Assessment and Plan:  # Metastatic Spindle Cell Sarcoma  S/p progression on multiple lines of therapy as above. Has not tolerated chemotherapy well. Most recently received Trabectedin 11/15/22. Has had significant toxicity including HUSSEIN, hepatic toxicity, nausea, anorexia, dehydration, pancytopenia. He continues to feel poorly now.    HOLD any additional chemotherapy for the moment to allow for recovery from acute chemotherapy toxicities.    Will plan for restaging CT once he is recovered to assess response to Trabectedin before considering additional treatment at a lower dose.    # Mucositis  Ongoing throughout all of treatment. Slightly improving. Continue Doxepin, MMW, salt/soda, Nystatin.     # Nausea  # Dyspepsia  Continue Zofran, Compazine, PPI. Manageable.     # Poor Oral Intake  # Dehydration  # HUSSEIN, improving  # Hypokalemia  # Hypophosphatemia  Continue IVF 1-2x weekly at [place]. His creat continues to slowly improve. Continue phos 250mg BID and potassium 20meq daily. Lab check at [place] scheduled for 12/6/22.    # Acute LFT elevation with hyperbilirubinemia  # Nonspecific ductal dilation on RUQ US  Suspect acute toxicity from chemotherapy. Cannot to CT with creat and hesitant about ERCP with cytopenias. LFTs have been improving and he has no clinical signs of cholangitis. Recheck LFTs each visit. Will assess liver further on restaging CT once creat improved.    He is off statin and should continue off for now. Consider restarting in future.     # Pancytopenia  2/2 chemotherapy. Received pRBC 12/2/22. Recheck CBC tomorrow at [place].    # Superficial Phlebitis  RUE on US 11/30/22. Continue warm compress, improving. Avoid anticoagulation with thrombocytopenia and no DVT    # Cancer related pain  Continue Tylenol, bengay, Celebrex (monitor creat with restarting).    # HTN  Started amlodipine 12/2/22. Tolerating well. No edema. Monitor BP at [place].      # Hypopituitarism  #  Hypothyroidism  Continue Hydrocortisone and Synthroid. He had already increased hydrocortisone with not feeling well the past few weeks. Appreciate endo input.    # Depression  Continue Lexapro    Overall Plan  -Hold any chemotherapy for the time being  -Labs and IVF 1-2x weekly at [place]  -MARLEEN follow-up later this week   -CT once labs improved    30 minutes spent on the date of the encounter doing chart review, review of test results, interpretation of tests, patient visit and documentation     Maynor Rios PA-C  Department of Hematology and Oncology  [facility]

## 2022-12-05 NOTE — LETTER
12/5/2022         RE: Ifrah Huitron  [id number] [address] [facility] [address]        Dear Colleague,    Thank you for referring your patient, Ifrah Huitron, to the [facility]. Please see a copy of my visit note below.    Ifrah is a 72 year old who is being evaluated via a billable video visit.      How would you like to obtain your AVS? MyChart  If the video visit is dropped, the invitation should be resent by: Send to e-mail at: [email or web address]  Will anyone else be joining your video visit? No     Amanda Cathy RODRIGUEZ        Video-Visit Details    Video Start Time: 1:30pm    Type of service:  Video Visit    Video End Time:1:55pm    Originating Location (pt. Location): Home        Distant Location (provider location):  On-site    Platform used for Video Visit: [facility]    Oncology/Hematology Visit Note  Dec 5, 2022    Reason for Visit: Follow up of spindle cell sarcoma     History of Present Illness: Ifrah Huitron is a 72 year old male with PMH rosacea, pituitary macroadenoma s/p resection, GERD, kidney stones, DJD with metastatic spindle cell sarcoma. He presented to his PCP March 2021 with chest pain/left shoulder and back pain that led to imaging which revealed multiple lung mets. There were difficulties in getting diagnostic biopsy, eventually biopsy of mediastinal mass with spindle cell sarcoma. There was high PD-L1 expression (90%). Baseline imaging 6/21/21 with progression of lung mets and left-sided subdiaphragmatic mass, stable liver lesions. 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      He started Keytruda 6/21/21. CT 8/2/21 with positive response to treatment. CT 10/18/21 with mixed response- LUQ mass larger, anterior mediastinal and left anterior pleural mass smaller. Keytruda stopped.     Started on Doxil + Ifosfamide 10/26/21. Poorly tolerated with mucositis, nausea, poor oral intake. CT with  stable to positive response.      Received C2 12/1/21 (delayed for tolerance issues) with 10% dose reduction.     Received C3 1/4/22 with same 10% dose reduction. He had issues with nausea inpatient along with recurrent thrush.      Received C4 2/2/22 with same 10% dose reduction. Had more significant neurotoxicity so only received 5.5 days. Symptoms resolved after stopping Ifosfamide.      CT 3/8/22 with positive response to treatment.      He was on Doxil alone but has been dealing with worsening mouth and skin toxicity and has required delays and dose reductions. CT 8/8/22 with disease progression.     Switched to Gemzar 8/10/22. Completed 3 cycles complicated by edema and cytopenias. CT with progression.    Received Trabectedin 11/15/22. Admitted with HUSSEIN. Complicated by cytopenias requiring transfusion, acute hepatic toxicity.     Interval History:  -Pain worse. Hasn't been doing Celebrex, back and front. All on the left.  -Statin on hold  -Nausea is better. Zofran (+compazine). No olanzapine.  -Still not eating well. Appetite, mucositis, thrush. Taste bad.   -Hydrating OK. +dizziness, palpitations, WALKER  -Blood transfusion helped for one day  -No fevers. No jaundice. Bowels OK. No bleeding.  -No leg swelling. R hand is still a little swollen.   -Already increased Hydrocortisone 30mg AM and 20mg PM    Current Outpatient Medications   Medication Sig Dispense Refill     amLODIPine (NORVASC) 5 MG tablet Take 1 tablet (5 mg) by mouth daily 30 tablet 3     celecoxib (CELEBREX) 200 MG capsule Take 1 capsule (200 mg) by mouth 2 times daily       clotrimazole (MYCELEX) 10 MG lozenge Place 1 lozenge (10 mg) inside cheek 5 times daily (Patient not taking: No sig reported) 90 lozenge 1     doxepin (SINEQUAN) 10 MG/ML (HIGH CONC) solution Take 2.5 mLs (25 mg) by mouth 2 times daily as needed (rinse for mucositis) Dilute the 2.5 mL of doxepin to 5 ml total in sterile or distilled water. 118 mL 0     escitalopram (LEXAPRO) 10  "MG tablet Take 1 tablet (10 mg) by mouth daily 90 tablet 3     guaiFENesin-codeine (GUAIFENESIN AC) 100-10 MG/5ML syrup Take 10 mLs by mouth every 4 hours as needed for cough 118 mL 0     hydrocortisone (CORTEF) 10 MG tablet Take 20 mg in the morning and 10 mg in the afternoon 270 tablet 3     Insulin Syringe-Needle U-100 27.5G X 5/8\" 2 ML MISC 1 each daily as needed (with solucortef for adrenal crisis) 10 each 1     levothyroxine (SYNTHROID/LEVOTHROID) 75 MCG tablet Take 1 tablet (75 mcg) by mouth every morning 90 tablet 3     Menthol-Methyl Salicylate (DALIA MALIK GREASELESS) cream Apply topically 2 times daily       metroNIDAZOLE (METROGEL) 1 % external gel Apply topically daily Try stronger dose due to increased symptoms after chemo. 30 g 0     omeprazole (PRILOSEC) 20 MG DR capsule Take 1 capsule (20 mg) by mouth 2 times daily 60 capsule 1     ondansetron (ZOFRAN) 4 MG tablet Take 1-2 tablets (4-8 mg) by mouth every 8 hours as needed for nausea 60 tablet 3     phosphorus tablet 250 mg (PHOSPHA 250 NEUTRAL) 250 MG per tablet Take 1 tablet (250 mg) by mouth 2 times daily 60 tablet 3     potassium chloride ER (KLOR-CON M) 20 MEQ CR tablet Take 1 tablet (20 mEq) by mouth daily 90 tablet 3     prochlorperazine (COMPAZINE) 5 MG tablet Take 1 tablet (5 mg) by mouth every 6 hours as needed for nausea or vomiting . Caution: causes sedation. 30 tablet 1     SUMAtriptan (IMITREX) 50 MG tablet Take 1 tablet (50 mg) by mouth at onset of headache for migraine May repeat in 2 hours. Max 4 tablets/24 hours. (Patient taking differently: Take 50 mg by mouth at onset of headache for migraine May repeat in 2 hours. Max 4 tablets/24 hours. PRN) 10 tablet 3     triamcinolone (KENALOG) 0.1 % external cream Apply topically 2 times daily to bothersome skin areas. 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     " Thyroid disease     removed pituitary gland     Past Surgical History:   Procedure Laterality Date     COLONOSCOPY N/A 12/17/2020    Procedure: COLONOSCOPY;  Surgeon: Ken Camacho MD;  Location: [facility]     ENT SURGERY       HERNIA REPAIR       INSERT PORT VASCULAR ACCESS Right 1/28/2022    Procedure: INSERTION, VASCULAR ACCESS PORT;  Surgeon: Daniel Kinney MD;  Location: [facility] OR     IR CHEST PORT PLACEMENT > 5 YRS OF AGE  1/28/2022     LARYNGOSCOPY, EXCISE VOCAL CORD LESION MICROSCOPIC, COMBINED Left 07/01/2021    Procedure: MICROLARYNGOSCOPY, LEFT TRUE VOCAL CORD INJECTION WITH PROLARYN;  Surgeon: Kyree Bearden MD;  Location: [facility]     PHACOEMULSIFICATION WITH STANDARD INTRAOCULAR LENS IMPLANT Right 03/10/2021    Procedure: Cataract removal with implant.;  Surgeon: Jamir Mac MD;  Location: [facility]     PHACOEMULSIFICATION WITH STANDARD INTRAOCULAR LENS IMPLANT Left 04/05/2021    Procedure: Cataract removal with implant.;  Surgeon: Jamir Mac MD;  Location: [facility]     PICC DOUBLE LUMEN PLACEMENT Right 12/01/2021    5FR DL PICC, basilic vein. L-38cm, 1cm out.     PICC DOUBLE LUMEN PLACEMENT Right 01/04/2022    Right cephalic, 41 cm, 1 external length     PITUITARY EXCISION       tooth pulled 4/7  Right      Allergies   Allergen Reactions     Penicillins Hives and Swelling            Social History   Social History     Tobacco Use     Smoking status: Never     Smokeless tobacco: Never   Substance Use Topics     Alcohol use: Yes     Comment: rare     Drug use: Never      Past medical history and social history were reviewed.    Physical Examination:  There were no vitals taken for this visit.  Wt Readings from Last 10 Encounters:   11/26/22 70.7 kg (155 lb 14.4 oz)   11/23/22 68 kg (150 lb)   10/19/22 67.9 kg (149 lb 12.8 oz)   10/06/22 65 kg (143 lb 3.2 oz)   09/27/22 65.3 kg (144 lb)   09/18/22 61.2 kg (135 lb)   09/15/22 63.1 kg (139 lb 3.2 oz)   09/08/22 63.2 kg (139 lb 6.4 oz)    08/25/22 62.3 kg (137 lb 6.4 oz)   08/10/22 63.4 kg (139 lb 11.2 oz)     Video physical exam  General: Patient appears well in no acute distress.   Skin: No visualized rash or lesions on visualized skin  Eyes: EOMI, no erythema, sclera icterus or discharge noted  Resp: Appears to be breathing comfortably without accessory muscle usage, speaking in full sentences, no cough  MSK: Appears to have normal range of motion based on visualized movements  Neurologic: No apparent tremors, facial movements symmetric  Psych: affect normal, alert and oriented    Laboratory Data:   Latest Reference Range & Units 12/02/22 08:09   Sodium 136 - 145 mmol/L 137   Potassium 3.4 - 5.3 mmol/L 3.1 (L)   Chloride 98 - 107 mmol/L 103   Carbon Dioxide (CO2) 22 - 29 mmol/L 27   Urea Nitrogen 8.0 - 23.0 mg/dL 32.6 (H)   Creatinine 0.67 - 1.17 mg/dL 1.53 (H)   GFR Estimate >60 mL/min/1.73m2 47 (L)   Calcium 8.8 - 10.2 mg/dL 8.5 (L)   Anion Gap 7 - 15 mmol/L 7   Phosphorus 2.5 - 4.5 mg/dL 2.1 (L)   Albumin 3.5 - 5.2 g/dL 3.5   Protein Total 6.4 - 8.3 g/dL 5.8 (L)   Alkaline Phosphatase 40 - 129 U/L 256 (H)   ALT 10 - 50 U/L 63 (H)   AST 10 - 50 U/L 45   Bilirubin Total <=1.2 mg/dL 1.3 (H)   Glucose 70 - 99 mg/dL 84   WBC 4.0 - 11.0 10e3/uL 5.5   Hemoglobin 13.3 - 17.7 g/dL 7.5 (L)   Hematocrit 40.0 - 53.0 % 23.4 (L)   Platelet Count 150 - 450 10e3/uL 21 (LL)   RBC Count 4.40 - 5.90 10e6/uL 2.56 (L)   MCV 78 - 100 fL 91   MCH 26.5 - 33.0 pg 29.3   MCHC 31.5 - 36.5 g/dL 32.1   RDW 10.0 - 15.0 % 16.6 (H)   % Neutrophils % 30   % Lymphocytes % 55   % Monocytes % 15   % Eosinophils % 0   % Basophils % 0   Absolute Basophils 0.0 - 0.2 10e3/uL 0.0   Absolute Neutrophil 1.6 - 8.3 10e3/uL 1.7   Absolute Lymphocytes 0.8 - 5.3 10e3/uL 3.0   Absolute Monocytes 0.0 - 1.3 10e3/uL 0.8   Absolute Eosinophils 0.0 - 0.7 10e3/uL 0.0   RBC Morphology  Confirmed RBC Indices   Platelet Morphology Automated Count Confirmed. Platelet morphology is normal.  Automated  Count Confirmed. Platelet morphology is normal.   ABO/Rh(D)  O POS   Antibody Screen Negative  Negative   SPECIMEN EXPIRATION DATE  [id number]   (LL): Data is critically low  (L): Data is abnormally low  (H): Data is abnormally high      Assessment and Plan:  # Metastatic Spindle Cell Sarcoma  S/p progression on multiple lines of therapy as above. Has not tolerated chemotherapy well. Most recently received Trabectedin 11/15/22. Has had significant toxicity including HUSSEIN, hepatic toxicity, nausea, anorexia, dehydration, pancytopenia. He continues to feel poorly now.    HOLD any additional chemotherapy for the moment to allow for recovery from acute chemotherapy toxicities.    Will plan for restaging CT once he is recovered to assess response to Trabectedin before considering additional treatment at a lower dose.    # Mucositis  Ongoing throughout all of treatment. Slightly improving. Continue Doxepin, MMW, salt/soda, Nystatin.     # Nausea  # Dyspepsia  Continue Zofran, Compazine, PPI. Manageable.     # Poor Oral Intake  # Dehydration  # HUSSEIN, improving  # Hypokalemia  # Hypophosphatemia  Continue IVF 1-2x weekly at [place]. His creat continues to slowly improve. Continue phos 250mg BID and potassium 20meq daily. Lab check at [place] scheduled for 12/6/22.    # Acute LFT elevation with hyperbilirubinemia  # Nonspecific ductal dilation on RUQ US  Suspect acute toxicity from chemotherapy. Cannot to CT with creat and hesitant about ERCP with cytopenias. LFTs have been improving and he has no clinical signs of cholangitis. Recheck LFTs each visit. Will assess liver further on restaging CT once creat improved.    He is off statin and should continue off for now. Consider restarting in future.     # Pancytopenia  2/2 chemotherapy. Received pRBC 12/2/22. Recheck CBC tomorrow at [place].    # Superficial Phlebitis  RUE on US 11/30/22. Continue warm compress, improving. Avoid anticoagulation with thrombocytopenia and no  DVT    # Cancer related pain  Continue Tylenol, bengay, Celebrex (monitor creat with restarting).    # HTN  Started amlodipine 12/2/22. Tolerating well. No edema. Monitor BP at [place].      # Hypopituitarism  # Hypothyroidism  Continue Hydrocortisone and Synthroid. He had already increased hydrocortisone with not feeling well the past few weeks. Appreciate endo input.    # Depression  Continue Lexapro    Overall Plan  -Hold any chemotherapy for the time being  -Labs and IVF 1-2x weekly at [place]  -MARLEEN follow-up later this week   -CT once labs improved    30 minutes spent on the date of the encounter doing chart review, review of test results, interpretation of tests, patient visit and documentation     Maynor Rios PA-C  Department of Hematology and Oncology  [facility]         Again, thank you for allowing me to participate in the care of your patient.        Sincerely,        GERALDO Mullins

## 2022-12-05 NOTE — LETTER
12/5/2022         RE: Ifrah Huitron  [id number] [address] [facility] [address]        Dear Colleague,    Thank you for referring your patient, Ifrah Huitron, to the [facility]. Please see a copy of my visit note below.    Ifrah is a 72 year old who is being evaluated via a billable video visit.      How would you like to obtain your AVS? MyChart  If the video visit is dropped, the invitation should be resent by: Send to e-mail at: [email or web address]  Will anyone else be joining your video visit? No     Amanda Cathy RODRIGUEZ        Video-Visit Details    Video Start Time: 1:30pm    Type of service:  Video Visit    Video End Time:1:55pm    Originating Location (pt. Location): Home        Distant Location (provider location):  On-site    Platform used for Video Visit: [facility]    Oncology/Hematology Visit Note  Dec 5, 2022    Reason for Visit: Follow up of spindle cell sarcoma     History of Present Illness: Ifrah Huitron is a 72 year old male with PMH rosacea, pituitary macroadenoma s/p resection, GERD, kidney stones, DJD with metastatic spindle cell sarcoma. He presented to his PCP March 2021 with chest pain/left shoulder and back pain that led to imaging which revealed multiple lung mets. There were difficulties in getting diagnostic biopsy, eventually biopsy of mediastinal mass with spindle cell sarcoma. There was high PD-L1 expression (90%). Baseline imaging 6/21/21 with progression of lung mets and left-sided subdiaphragmatic mass, stable liver lesions. 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      He started Keytruda 6/21/21. CT 8/2/21 with positive response to treatment. CT 10/18/21 with mixed response- LUQ mass larger, anterior mediastinal and left anterior pleural mass smaller. Keytruda stopped.     Started on Doxil + Ifosfamide 10/26/21. Poorly tolerated with mucositis, nausea, poor oral intake. CT with  stable to positive response.      Received C2 12/1/21 (delayed for tolerance issues) with 10% dose reduction.     Received C3 1/4/22 with same 10% dose reduction. He had issues with nausea inpatient along with recurrent thrush.      Received C4 2/2/22 with same 10% dose reduction. Had more significant neurotoxicity so only received 5.5 days. Symptoms resolved after stopping Ifosfamide.      CT 3/8/22 with positive response to treatment.      He was on Doxil alone but has been dealing with worsening mouth and skin toxicity and has required delays and dose reductions. CT 8/8/22 with disease progression.     Switched to Gemzar 8/10/22. Completed 3 cycles complicated by edema and cytopenias. CT with progression.    Received Trabectedin 11/15/22. Admitted with HUSSEIN. Complicated by cytopenias requiring transfusion, acute hepatic toxicity.     Interval History:  -Pain worse. Hasn't been doing Celebrex, back and front. All on the left.  -Statin on hold  -Nausea is better. Zofran (+compazine). No olanzapine.  -Still not eating well. Appetite, mucositis, thrush. Taste bad.   -Hydrating OK. +dizziness, palpitations, WALKER  -Blood transfusion helped for one day  -No fevers. No jaundice. Bowels OK. No bleeding.  -No leg swelling. R hand is still a little swollen.   -Already increased Hydrocortisone 30mg AM and 20mg PM    Current Outpatient Medications   Medication Sig Dispense Refill     amLODIPine (NORVASC) 5 MG tablet Take 1 tablet (5 mg) by mouth daily 30 tablet 3     celecoxib (CELEBREX) 200 MG capsule Take 1 capsule (200 mg) by mouth 2 times daily       clotrimazole (MYCELEX) 10 MG lozenge Place 1 lozenge (10 mg) inside cheek 5 times daily (Patient not taking: No sig reported) 90 lozenge 1     doxepin (SINEQUAN) 10 MG/ML (HIGH CONC) solution Take 2.5 mLs (25 mg) by mouth 2 times daily as needed (rinse for mucositis) Dilute the 2.5 mL of doxepin to 5 ml total in sterile or distilled water. 118 mL 0     escitalopram (LEXAPRO) 10  "MG tablet Take 1 tablet (10 mg) by mouth daily 90 tablet 3     guaiFENesin-codeine (GUAIFENESIN AC) 100-10 MG/5ML syrup Take 10 mLs by mouth every 4 hours as needed for cough 118 mL 0     hydrocortisone (CORTEF) 10 MG tablet Take 20 mg in the morning and 10 mg in the afternoon 270 tablet 3     Insulin Syringe-Needle U-100 27.5G X 5/8\" 2 ML MISC 1 each daily as needed (with solucortef for adrenal crisis) 10 each 1     levothyroxine (SYNTHROID/LEVOTHROID) 75 MCG tablet Take 1 tablet (75 mcg) by mouth every morning 90 tablet 3     Menthol-Methyl Salicylate (DALIA MALIK GREASELESS) cream Apply topically 2 times daily       metroNIDAZOLE (METROGEL) 1 % external gel Apply topically daily Try stronger dose due to increased symptoms after chemo. 30 g 0     omeprazole (PRILOSEC) 20 MG DR capsule Take 1 capsule (20 mg) by mouth 2 times daily 60 capsule 1     ondansetron (ZOFRAN) 4 MG tablet Take 1-2 tablets (4-8 mg) by mouth every 8 hours as needed for nausea 60 tablet 3     phosphorus tablet 250 mg (PHOSPHA 250 NEUTRAL) 250 MG per tablet Take 1 tablet (250 mg) by mouth 2 times daily 60 tablet 3     potassium chloride ER (KLOR-CON M) 20 MEQ CR tablet Take 1 tablet (20 mEq) by mouth daily 90 tablet 3     prochlorperazine (COMPAZINE) 5 MG tablet Take 1 tablet (5 mg) by mouth every 6 hours as needed for nausea or vomiting . Caution: causes sedation. 30 tablet 1     SUMAtriptan (IMITREX) 50 MG tablet Take 1 tablet (50 mg) by mouth at onset of headache for migraine May repeat in 2 hours. Max 4 tablets/24 hours. (Patient taking differently: Take 50 mg by mouth at onset of headache for migraine May repeat in 2 hours. Max 4 tablets/24 hours. PRN) 10 tablet 3     triamcinolone (KENALOG) 0.1 % external cream Apply topically 2 times daily to bothersome skin areas. 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     " Thyroid disease     removed pituitary gland     Past Surgical History:   Procedure Laterality Date     COLONOSCOPY N/A 12/17/2020    Procedure: COLONOSCOPY;  Surgeon: Ken Camacho MD;  Location: [facility]     ENT SURGERY       HERNIA REPAIR       INSERT PORT VASCULAR ACCESS Right 1/28/2022    Procedure: INSERTION, VASCULAR ACCESS PORT;  Surgeon: Daniel Kinney MD;  Location: [facility] OR     IR CHEST PORT PLACEMENT > 5 YRS OF AGE  1/28/2022     LARYNGOSCOPY, EXCISE VOCAL CORD LESION MICROSCOPIC, COMBINED Left 07/01/2021    Procedure: MICROLARYNGOSCOPY, LEFT TRUE VOCAL CORD INJECTION WITH PROLARYN;  Surgeon: Kyree Bearden MD;  Location: [facility]     PHACOEMULSIFICATION WITH STANDARD INTRAOCULAR LENS IMPLANT Right 03/10/2021    Procedure: Cataract removal with implant.;  Surgeon: Jamir Mac MD;  Location: [facility]     PHACOEMULSIFICATION WITH STANDARD INTRAOCULAR LENS IMPLANT Left 04/05/2021    Procedure: Cataract removal with implant.;  Surgeon: Jamir Mac MD;  Location: [facility]     PICC DOUBLE LUMEN PLACEMENT Right 12/01/2021    5FR DL PICC, basilic vein. L-38cm, 1cm out.     PICC DOUBLE LUMEN PLACEMENT Right 01/04/2022    Right cephalic, 41 cm, 1 external length     PITUITARY EXCISION       tooth pulled 4/7  Right      Allergies   Allergen Reactions     Penicillins Hives and Swelling            Social History   Social History     Tobacco Use     Smoking status: Never     Smokeless tobacco: Never   Substance Use Topics     Alcohol use: Yes     Comment: rare     Drug use: Never      Past medical history and social history were reviewed.    Physical Examination:  There were no vitals taken for this visit.  Wt Readings from Last 10 Encounters:   11/26/22 70.7 kg (155 lb 14.4 oz)   11/23/22 68 kg (150 lb)   10/19/22 67.9 kg (149 lb 12.8 oz)   10/06/22 65 kg (143 lb 3.2 oz)   09/27/22 65.3 kg (144 lb)   09/18/22 61.2 kg (135 lb)   09/15/22 63.1 kg (139 lb 3.2 oz)   09/08/22 63.2 kg (139 lb 6.4 oz)    08/25/22 62.3 kg (137 lb 6.4 oz)   08/10/22 63.4 kg (139 lb 11.2 oz)     Video physical exam  General: Patient appears well in no acute distress.   Skin: No visualized rash or lesions on visualized skin  Eyes: EOMI, no erythema, sclera icterus or discharge noted  Resp: Appears to be breathing comfortably without accessory muscle usage, speaking in full sentences, no cough  MSK: Appears to have normal range of motion based on visualized movements  Neurologic: No apparent tremors, facial movements symmetric  Psych: affect normal, alert and oriented    Laboratory Data:   Latest Reference Range & Units 12/02/22 08:09   Sodium 136 - 145 mmol/L 137   Potassium 3.4 - 5.3 mmol/L 3.1 (L)   Chloride 98 - 107 mmol/L 103   Carbon Dioxide (CO2) 22 - 29 mmol/L 27   Urea Nitrogen 8.0 - 23.0 mg/dL 32.6 (H)   Creatinine 0.67 - 1.17 mg/dL 1.53 (H)   GFR Estimate >60 mL/min/1.73m2 47 (L)   Calcium 8.8 - 10.2 mg/dL 8.5 (L)   Anion Gap 7 - 15 mmol/L 7   Phosphorus 2.5 - 4.5 mg/dL 2.1 (L)   Albumin 3.5 - 5.2 g/dL 3.5   Protein Total 6.4 - 8.3 g/dL 5.8 (L)   Alkaline Phosphatase 40 - 129 U/L 256 (H)   ALT 10 - 50 U/L 63 (H)   AST 10 - 50 U/L 45   Bilirubin Total <=1.2 mg/dL 1.3 (H)   Glucose 70 - 99 mg/dL 84   WBC 4.0 - 11.0 10e3/uL 5.5   Hemoglobin 13.3 - 17.7 g/dL 7.5 (L)   Hematocrit 40.0 - 53.0 % 23.4 (L)   Platelet Count 150 - 450 10e3/uL 21 (LL)   RBC Count 4.40 - 5.90 10e6/uL 2.56 (L)   MCV 78 - 100 fL 91   MCH 26.5 - 33.0 pg 29.3   MCHC 31.5 - 36.5 g/dL 32.1   RDW 10.0 - 15.0 % 16.6 (H)   % Neutrophils % 30   % Lymphocytes % 55   % Monocytes % 15   % Eosinophils % 0   % Basophils % 0   Absolute Basophils 0.0 - 0.2 10e3/uL 0.0   Absolute Neutrophil 1.6 - 8.3 10e3/uL 1.7   Absolute Lymphocytes 0.8 - 5.3 10e3/uL 3.0   Absolute Monocytes 0.0 - 1.3 10e3/uL 0.8   Absolute Eosinophils 0.0 - 0.7 10e3/uL 0.0   RBC Morphology  Confirmed RBC Indices   Platelet Morphology Automated Count Confirmed. Platelet morphology is normal.  Automated  Count Confirmed. Platelet morphology is normal.   ABO/Rh(D)  O POS   Antibody Screen Negative  Negative   SPECIMEN EXPIRATION DATE  [id number]   (LL): Data is critically low  (L): Data is abnormally low  (H): Data is abnormally high      Assessment and Plan:  # Metastatic Spindle Cell Sarcoma  S/p progression on multiple lines of therapy as above. Has not tolerated chemotherapy well. Most recently received Trabectedin 11/15/22. Has had significant toxicity including HUSSEIN, hepatic toxicity, nausea, anorexia, dehydration, pancytopenia. He continues to feel poorly now.    HOLD any additional chemotherapy for the moment to allow for recovery from acute chemotherapy toxicities.    Will plan for restaging CT once he is recovered to assess response to Trabectedin before considering additional treatment at a lower dose.    # Mucositis  Ongoing throughout all of treatment. Slightly improving. Continue Doxepin, MMW, salt/soda, Nystatin.     # Nausea  # Dyspepsia  Continue Zofran, Compazine, PPI. Manageable.     # Poor Oral Intake  # Dehydration  # HUSSEIN, improving  # Hypokalemia  # Hypophosphatemia  Continue IVF 1-2x weekly at [place]. His creat continues to slowly improve. Continue phos 250mg BID and potassium 20meq daily. Lab check at [place] scheduled for 12/6/22.    # Acute LFT elevation with hyperbilirubinemia  # Nonspecific ductal dilation on RUQ US  Suspect acute toxicity from chemotherapy. Cannot to CT with creat and hesitant about ERCP with cytopenias. LFTs have been improving and he has no clinical signs of cholangitis. Recheck LFTs each visit. Will assess liver further on restaging CT once creat improved.    He is off statin and should continue off for now. Consider restarting in future.     # Pancytopenia  2/2 chemotherapy. Received pRBC 12/2/22. Recheck CBC tomorrow at [place].    # Superficial Phlebitis  RUE on US 11/30/22. Continue warm compress, improving. Avoid anticoagulation with thrombocytopenia and no  DVT    # Cancer related pain  Continue Tylenol, bengay, Celebrex (monitor creat with restarting).    # HTN  Started amlodipine 12/2/22. Tolerating well. No edema. Monitor BP at [place].      # Hypopituitarism  # Hypothyroidism  Continue Hydrocortisone and Synthroid. He had already increased hydrocortisone with not feeling well the past few weeks. Appreciate endo input.    # Depression  Continue Lexapro    Overall Plan  -Hold any chemotherapy for the time being  -Labs and IVF 1-2x weekly at [place]  -MARLEEN follow-up later this week   -CT once labs improved    30 minutes spent on the date of the encounter doing chart review, review of test results, interpretation of tests, patient visit and documentation     Maynor Rios PA-C  Department of Hematology and Oncology  [facility]         Again, thank you for allowing me to participate in the care of your patient.        Sincerely,        GERALDO Mullins

## 2022-12-06 NOTE — PROGRESS NOTES
Infusion Nursing Note:  Ifrah Huitron presents today for  IV supportive care.    Patient seen by provider today: No   present during visit today: Not Applicable.    Note: N/A.  Intravenous Access:  Implanted Port.    Treatment Conditions:  Patient received magnesium sulfate for mag level of 1.5 and a liter of NS per standing orders today.      Post Infusion Assessment:  Patient tolerated infusion without incident.  Access discontinued per protocol.       Discharge Plan:   Patient discharged in stable condition accompanied by: self.  Departure Mode: Ambulatory.  Pt is scheduled to return on 12/9 to have labs and interventions as needed.  Deepika Quach RN

## 2022-12-09 NOTE — TELEPHONE ENCOUNTER
12/9/22    Ifrah is doing better today. He was able to work this morning and had lunch with his daughter. He had fluids and nausea meds today and that helped. Nausea is doing well. He still doesn't eat much. Hydration is still touch and go, he is working on it. He had a day earlier in the week where he had a lot of energy but may have overdone it. His pain is better- restarted Celebrex and that is really helping. He also connected with the medical cannabis team. Mucositis persists but minimal.     Labs overall improved except creat. Will continue all current meds except reduce Celebrex to once daily to help with renal function and he will push fluids.     Will get scan and visit end of December. Continue weekly lab monitoring and fluids.      Maynor Rios PA-C

## 2022-12-09 NOTE — PROGRESS NOTES
Infusion Nursing Note:  Ifrah Huitron presents today for IVFs, possible blood transfusion.    Patient seen by provider today: No   present during visit today: Not Applicable.    Note: Emend & Decadron given for nausea.    Intravenous Access:  Implanted Port.    Treatment Conditions:  Results reviewed, labs did NOT meet treatment parameters: Hgb >8.  No electrolyte replacement indicated.    Post Infusion Assessment:  Patient tolerated infusion without incident.  Blood return noted pre and post infusion.  Site patent and intact, free from redness, edema or discomfort.  No evidence of extravasations.  Access discontinued per protocol.     Discharge Plan:   Patient discharged in stable condition accompanied by: self.  Departure Mode: Ambulatory.      Warren Santana RN

## 2022-12-14 NOTE — PROGRESS NOTES
Infusion Nursing Note:  Ifrah Huitron presents today for IVF and possible blood products and electrolyte replacement.    Patient seen by provider today: No   present during visit today: Not Applicable.    Note: Pt reports he's feeling well. No nausea, no concerns. Would like IVF today while waiting for lab results.    Intravenous Access:  Implanted Port.    Treatment Conditions:  Lab Results   Component Value Date    HGB 8.7 (L) 12/14/2022    WBC 7.3 12/14/2022    ANEU 3.1 12/14/2022    ANEUTAUTO 7.5 11/15/2022     (L) 12/14/2022      Lab Results   Component Value Date     12/14/2022    POTASSIUM 3.8 12/14/2022    MAG 1.7 12/14/2022    CR 1.47 (H) 12/14/2022    COTY 9.3 12/14/2022    BILITOTAL 0.5 12/14/2022    ALBUMIN 3.7 12/14/2022     (H) 12/14/2022    AST 50 12/14/2022     Results reviewed, labs did NOT meet treatment parameters: blood products and electrolytes.    Post Infusion Assessment:  Patient tolerated infusion without incident.  Blood return noted pre and post infusion.  Site patent and intact, free from redness, edema or discomfort.  No evidence of extravasations.  Access discontinued per protocol.     Discharge Plan:   Discharge instructions reviewed with: Patient.  Patient and/or family verbalized understanding of discharge instructions and all questions answered.  Patient discharged in stable condition accompanied by: self.  Departure Mode: Ambulatory.      Sarahi Astudillo RN

## 2022-12-22 PROBLEM — R79.89 ELEVATED LFTS: Status: ACTIVE | Noted: 2022-01-01

## 2022-12-22 NOTE — PROGRESS NOTES
Infusion Nursing Note:  Ifrah Huitron presents today for possible RBC transfusion, IVFs & antiemetics.    Patient seen by provider today: No   present during visit today: Not Applicable.    Note: Ifrah has been feeling unwell since last night. He reports chills, T-max at home 99.0. Very weak & short of breath. Had a syncopal episode on toilet last night, denies any injuries. Ifrah reports taking adequate PO, however has very dry mouth. Ifrah also reports urinary frequency. He is visibly weak, skin hot to touch, pale. Writer spoke with GERALDO Zaman. Orders to obtain UA & have patient evaluated in ED. Add'l labs including the following were collected prior to transfer: blood cultures, UA, COVID/Influenza swab & lactic acid.    Intravenous Access:  Implanted Port.    Treatment Conditions:  Results reviewed, labs did NOT meet treatment parameters: no transfusion indicated.    Post Infusion Assessment:  Site patent and intact, free from redness, edema or discomfort.  No evidence of extravasations.  PAC saline locked prior to transfer.     Discharge Plan:   Patient Departure Mode: Transfer to ED via Wheelchair.  ED. Report to PETER Sloan RN

## 2022-12-22 NOTE — ED PROVIDER NOTES
History     Chief Complaint   Patient presents with     Fever     HPI  Ifrah Huitron is a 72 year old male who arrives from [facility] with concern about fever weakness and a report of a syncopal episode the night prior.    Patient's medical records show prior diagnosis of epidural lipoma ptosis, history of central hypothyroidism, spinal cell sarcoma, malignant mesothelioma, history of left vocal fold paralysis, mediastinal lymphadenopathy and a history of electrolyte abnormalities including hypophosphatemia and secondary adrenal insufficiency and recent hospitalization for renal failure a month prior.    On examination patient reports patient states that he had a near fainting spell while using the bathroom yesterday.  He has had no melena hematochezia is also had no diarrhea.  He has had some mucousy flatus by report while urinating.  He has left-sided abdominal pain which he attributes to his cancer diagnosis.  Patient reports that he has had nausea but no vomiting he reports feeling much improved with therapies given during his visit with his oncology care team prior to ED arrival.  He reports no rash.  He is also had no shortness of breath no chest pain or tightness and no palpitations.    Allergies:  Allergies   Allergen Reactions     Penicillins Hives and Swelling              Problem List:    Patient Active Problem List    Diagnosis Date Noted     Acute renal failure, unspecified acute renal failure type (H) 11/25/2022     Priority: Medium     Secondary adrenal insufficiency (H) 04/25/2022     Priority: Medium     History of pituitary surgery 04/25/2022     Priority: Medium     Anemia in neoplastic disease 02/14/2022     Priority: Medium     Thrush 01/11/2022     Priority: Medium     Encounter for other specified aftercare 12/07/2021     Priority: Medium     Sarcoma (H) 12/01/2021     Priority: Medium     Chemotherapy-induced neutropenia (H) 11/04/2021     Priority: Medium     Chemotherapy-induced  nausea 11/02/2021     Priority: Medium     Hypophosphatemia 11/02/2021     Priority: Medium     Anemia 11/02/2021     Priority: Medium     Vocal fold paralysis, left 06/21/2021     Priority: Medium     Added automatically from request for surgery [id number]       Dysphonia 06/21/2021     Priority: Medium     Added automatically from request for surgery [id number]       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     Priority: Medium     Added automatically from request for surgery [id number]       Mediastinal lymphadenopathy 06/21/2021     Priority: Medium     Added automatically from request for surgery [id number]       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     Priority: Medium     Mesothelioma, malignant (H) 03/26/2021     Priority: Medium     TUYET (generalized anxiety disorder) 05/23/2017     Priority: Medium     Degeneration of lumbar or lumbosacral intervertebral disc 01/04/2016     Priority: Medium     Osteoarthritis of spine with radiculopathy, lumbar region 01/04/2016     Priority: Medium     Epidural lipomatosis 12/03/2015     Priority: Medium     Chronic lower back pain 12/03/2015     Priority: Medium     Hypopituitarism (H) 08/25/2010     Priority: Medium     Central hypothyroidism 08/16/2010     Priority: Medium     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 Thyroid started 4/10  Low cortisol [1] 5/7/10 so secondary adrenal insufficiency  hypophysectomy 8/23/10    Formatting of this note might be different from the original.  1.9 cm  Formatting of this note might be different from the original.  Macroadenoma of 1.9 cm in largest dimension noted 4/10  Likely central thyroid and HGH deficiency. Thyroid started 4/10  Low cortisol [1] 5/7/10 so secondary adrenal insufficiency  hypophysectomy 8/23/10       Eczema 01/12/2009     Priority: Medium     Calculus of kidney 09/09/2004     Priority: Medium      Rosacea 09/09/2004     Priority: Medium     Inguinal hernia 09/09/2004     Priority: Medium     Formatting of this note might be different from the original.  Epic          Past Medical History:    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     Thyroid disease        Past Surgical History:    Past Surgical History:   Procedure Laterality Date     COLONOSCOPY N/A 12/17/2020    Procedure: COLONOSCOPY;  Surgeon: Ken Camacho MD;  Location: [facility]     ENT SURGERY       HERNIA REPAIR       INSERT PORT VASCULAR ACCESS Right 1/28/2022    Procedure: INSERTION, VASCULAR ACCESS PORT;  Surgeon: Daniel Kinney MD;  Location: [facility] OR     IR CHEST PORT PLACEMENT > 5 YRS OF AGE  1/28/2022     LARYNGOSCOPY, EXCISE VOCAL CORD LESION MICROSCOPIC, COMBINED Left 07/01/2021    Procedure: MICROLARYNGOSCOPY, LEFT TRUE VOCAL CORD INJECTION WITH PROLARYN;  Surgeon: Kyree Bearden MD;  Location: [facility] OR     PHACOEMULSIFICATION WITH STANDARD INTRAOCULAR LENS IMPLANT Right 03/10/2021    Procedure: Cataract removal with implant.;  Surgeon: Jamir Mac MD;  Location: [facility] OR     PHACOEMULSIFICATION WITH STANDARD INTRAOCULAR LENS IMPLANT Left 04/05/2021    Procedure: Cataract removal with implant.;  Surgeon: Jamir Mac MD;  Location: [facility] OR     PICC DOUBLE LUMEN PLACEMENT Right 12/01/2021    5FR DL PICC, basilic vein. L-38cm, 1cm out.     PICC DOUBLE LUMEN PLACEMENT Right 01/04/2022    Right cephalic, 41 cm, 1 external length     PITUITARY EXCISION       tooth pulled 4/7  Right        Family History:    Family History   Problem Relation Age of Onset     Lupus Mother      ALS Father      Rheumatoid Arthritis Sister        Social History:  Marital Status:   [2]  Social History     Tobacco Use     Smoking status: Never     Smokeless tobacco: Never   Substance Use Topics     Alcohol use: Yes     Comment: rare     Drug use:  "Never        Medications:    acetaminophen (TYLENOL) 500 MG tablet  amLODIPine (NORVASC) 5 MG tablet  aspirin-acetaminophen-caffeine (EXCEDRIN MIGRAINE) 250-250-65 MG tablet  Aspirin-Caffeine (JUAN A BACK & BODY PO)  celecoxib (CELEBREX) 200 MG capsule  doxepin (SINEQUAN) 10 MG/ML (HIGH CONC) solution  escitalopram (LEXAPRO) 10 MG tablet  guaiFENesin-codeine (GUAIFENESIN AC) 100-10 MG/5ML syrup  hydrocortisone (CORTEF) 10 MG tablet  levothyroxine (SYNTHROID/LEVOTHROID) 75 MCG tablet  Menthol-Methyl Salicylate (DALIA MALIK GREASELESS) cream  metroNIDAZOLE (METROGEL) 1 % external gel  omeprazole (PRILOSEC) 20 MG DR capsule  ondansetron (ZOFRAN) 4 MG tablet  phosphorus tablet 250 mg (PHOSPHA 250 NEUTRAL) 250 MG per tablet  potassium chloride ER (KLOR-CON M) 20 MEQ CR tablet  prochlorperazine (COMPAZINE) 5 MG tablet  SUMAtriptan (IMITREX) 50 MG tablet  triamcinolone (KENALOG) 0.1 % external cream  clotrimazole (MYCELEX) 10 MG lozenge  Insulin Syringe-Needle U-100 27.5G X 5/8\" 2 ML MISC          Review of Systems   Constitutional: Negative.    HENT: Negative.    Eyes: Negative.    Respiratory: Negative.    Cardiovascular: Negative.    Gastrointestinal: Positive for nausea.   Endocrine: Negative.    Genitourinary: Negative.    Musculoskeletal: Negative.    Skin: Negative.    Allergic/Immunologic: Negative.    Neurological: Positive for syncope and weakness.   Hematological: Negative.    Psychiatric/Behavioral: Negative.    All other systems reviewed and are negative.      Physical Exam   BP: 136/85  Pulse: 92  Temp: 100.3  F (37.9  C)  Resp: 20  Weight: 70.3 kg (155 lb)  SpO2: 97 %      Physical Exam  Constitutional:       General: He is not in acute distress.     Appearance: Normal appearance. He is not ill-appearing, toxic-appearing or diaphoretic.   HENT:      Head: Normocephalic and atraumatic.      Mouth/Throat:      Mouth: Mucous membranes are moist.   Eyes:      Extraocular Movements: Extraocular movements intact.      " Pupils: Pupils are equal, round, and reactive to light.   Cardiovascular:      Rate and Rhythm: Normal rate.   Pulmonary:      Effort: Pulmonary effort is normal. No respiratory distress.      Breath sounds: Normal breath sounds. No stridor. No wheezing, rhonchi or rales.   Chest:      Chest wall: No tenderness.   Musculoskeletal:         General: No swelling, tenderness, deformity or signs of injury.      Cervical back: Normal range of motion and neck supple.      Right lower leg: No edema.      Left lower leg: No edema.   Skin:     Capillary Refill: Capillary refill takes 2 to 3 seconds.      Coloration: Skin is pale.   Neurological:      General: No focal deficit present.      Mental Status: He is alert and oriented to person, place, and time.      Cranial Nerves: No cranial nerve deficit.      Sensory: No sensory deficit.      Motor: No weakness.      Coordination: Coordination normal.      Gait: Gait normal.      Deep Tendon Reflexes: Reflexes normal.   Psychiatric:         Mood and Affect: Mood normal.         Behavior: Behavior normal.         Thought Content: Thought content normal.         Judgment: Judgment normal.         ED Course                 Procedures              Critical Care time:  none             ED medications:  Medications   sodium chloride 0.9% infusion (has no administration in time range)   iopamidol (ISOVUE-370) solution 68 mL (68 mLs Intravenous Given 12/22/22 1400)   sodium chloride 0.9 % bag 500mL for CT scan flush use (59 mLs Intravenous Given 12/22/22 1400)   diazepam (VALIUM) injection 2 mg (2 mg Intravenous Given 12/22/22 1658)   gadobutrol (GADAVIST) injection 7 mL (7 mLs Intravenous Given 12/22/22 1750)   0.9% sodium chloride BOLUS (0 mLs Intravenous Stopped 12/22/22 2004)       ED Vitals:  Vitals:    12/22/22 1643 12/22/22 1644 12/22/22 1645 12/22/22 1646   BP:   (!) 147/98    Pulse:       Resp:   16    Temp:       TempSrc:       SpO2: 97% 99% 98% 98%   Weight:         Vitals:     12/22/22 1643 12/22/22 1644 12/22/22 1645 12/22/22 1646   BP:   (!) 147/98    Pulse:       Resp:   16    Temp:       TempSrc:       SpO2: 97% 99% 98% 98%   Weight:         ED Labs and imaging:  Results for orders placed or performed during the hospital encounter of 12/22/22 (from the past 24 hour(s))   CT Chest/Abdomen/Pelvis w Contrast    Narrative    CT CHEST/ABDOMEN/PELVIS WITH CONTRAST 12/22/2022 2:13 PM    CLINICAL HISTORY: Near syncopal spell. Fever, weakness, elevated liver  enzymes. 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 Last imaging  11/7/2022. Evaluate for interval change.    TECHNIQUE: CT scan of the chest, abdomen, and pelvis was performed  following injection of IV contrast. Multiplanar reformats were  obtained. Dose reduction techniques were used.   CONTRAST: 68 mL Isovue 370    COMPARISON: CT chest, abdomen and pelvis 11/7/2022.    FINDINGS:   LUNGS AND PLEURA: No effusions. No pneumothorax. New indeterminant 0.5  cm pulmonary nodule medial right lower lobe series 3 image 184. New  subpleural right upper lobe lateral nodule measuring 0.2 cm, image 85.  There are multiple additional examples of new small nodules  bilaterally, some with a groundglass character, for example, image 126  and others appearing small and solid clustered together, image 165.    MEDIASTINUM/AXILLAE: Stable mass at the anterior mediastinum and  medial left upper lobe region that is 3.7 x 2.7 cm series 2 image 62.  Right chest Port-A-Cath. Stable small mediastinal lymph nodes. No  acute mediastinal abnormality. Stable small nodule at the anterior  left pericardial fat measuring 0.7 cm image 85.    CORONARY ARTERY CALCIFICATION: Mild.    HEPATOBILIARY: Several small hypodensities in the liver are stable.  Some are suggestive of cysts, but others are too small for  characterization. No new liver lesion identified. Gallbladder shows no  acute  abnormality.    PANCREAS: Normal.    SPLEEN: Lobulated splenic mass is slightly larger measuring 9.3 x 8.4  cm, previously 8.9 x 7.9 cm image 129. This contacts the posterior  left hemidiaphragm. A portion of this also contacts the upper left  kidney.    ADRENAL GLANDS: Normal.    KIDNEYS/BLADDER: Stable bilateral renal cysts without specific imaging  follow-up recommended. No hydronephrosis or stone. Bladder is  unremarkable.    BOWEL: Colonic diverticula. No acute bowel abnormality. Normal  appendix.    PELVIC ORGANS: Prostate is 4.9 cm.    ADDITIONAL FINDINGS: No new adenopathy identified.    MUSCULOSKELETAL: Stable small sclerotic foci within the bilateral  pelvis, right femoral neck, left femoral head. No new bone lesion  identified.      Impression    IMPRESSION:  1.  Several bilateral new pulmonary nodules identified. These are  indeterminant. New pulmonary metastatic disease is a possibility,  though this could be infectious or inflammatory nodules. As such,  recommend short interval follow-up CT chest in three months.  2.  Slightly larger mass involving the spleen and contacting the  adjacent left posterior hemidiaphragm and left upper kidney.  3.  Stable size of an anterior mediastinal mass.  4.  Small hypodensities in the liver are stable. Many of these appear  to represent cysts, but others are too small for characterization.    AMBER JASON MD         SYSTEM ID:  [id number]   Abdomen MRI w & w/o contrast mrcp    Narrative    EXAM: MR ABDOMEN MRCP W/O and W CONTRAST  LOCATION: [facility]  DATE/TIME: 12/22/2022 5:49 PM    INDICATION: Elevated liver enzymes. Abnormal ultrasound on 11/30/2022. Fever, history of metastatic spindle cell carcinoma. Evaluate for acute hepatobiliary process.    COMPARISON: CT of chest, abdomen and pelvis 12/22/2022. Ultrasound abdomen 11/30/2022.    TECHNIQUE: Routine MR liver/pancreas protocol including axial and coronal MRCP sequences. 2D and 3D  reconstruction performed by MR technologist including MIP reconstruction and slab cholangiograms. If performed with contrast, additional dynamic T1 post   IV contrast images.    CONTRAST: 7 mL Gadavist.    FINDINGS:   MRCP: Gallbladder sludge identified. No convincing acute inflammatory change of the gallbladder wall on the supplied MRI images. There is mild biliary ductal dilatation diffusely. Common bile duct is 9 mm. There is small ill-defined low signal within the   distal common bile duct for example series 3, image 19 and on series 4, image 20. Also see series 6, image 10.     LIVER: There are multiple right and left hepatic simple cysts that are very small in size without worrisome features. No worrisome hepatic lesion identified.    PANCREAS: No solid pancreas lesion. The T2-weighted images show a few tiny cystic pancreas lesions without worrisome features. One of these at the uncinate measures 0.3 cm series 3, image 23. Another at the pancreas tail measures 0.3 cm series 3, image   25. No main pancreatic duct dilatation.    ADDITIONAL FINDINGS: Large lobulated mass occupying the spleen again noted and described on the comparison CT. It measures approximately 9 x 8.1 cm and contacts the posterior left hemidiaphragm, and contacts the upper left kidney. Multiple bilateral   renal cysts appear simple without specific imaging follow-up recommended. No hydronephrosis. Normal adrenals.      Impression    IMPRESSION:  1.  Mild biliary ductal dilatation. Small region of low signal at the distal common bile duct could represent gallbladder sludge extending to this region. There is gallbladder sludge noted within the gallbladder lumen as well. The common bile duct   measures 9 mm.  2.  A few tiny cystic lesions at the pancreas suggesting small cystic neoplasms. One year follow-up MRI is recommended for surveillance. No pancreas duct dilatation.  3.  Lobulated mass at the spleen consistent with malignancy again  identified.  4.  Several small simple hepatic cysts without worrisome features.         Assessments & Plan (with Medical Decision Making)   Assessment Summary and Clinical Impression: 72-year-old male who presented with fever weakness and a syncopal episode the night prior.  He is currently on chemotherapy for spindle cell sarcoma with lung mets and subdiaphragmatic mass and liver mets.  Patient also has a history of recent hospitalization for acute kidney injury a month prior.  He also has a history of adrenal insufficiency, pituitary macroadenoma status post resection with a history of hypothyroidism and pancytopenia.  He reported that he had a follow-up visit with his oncology care team who referred him due to concern for weakness and report of a near syncopal spell while using a toilet yesterday.  Patient reports that he has had no diarrhea but has nausea which is improved with therapies he was given prior to assessment.  He arrived with a blood pressure 136/85.  Intake blood pressure was 100.3F.  97% on room air.  Patient appeared his stated age with the stigmata of his underlying medical diagnoses.  He reported no new rash.  He has had no bloody diarrhea but reports of mucus with already but no abdominal bloating abdominal pain no cough no shortness of breath and no chest tightness.  With concerns related prior to arrival further imaging and work-up was undertaken.  Comparison imaging was obtained given persistent elevated liver enzymes with symptoms reported.  With elevated LFTs and abnormal MRCP patient is transferred to [facility] for further evaluation by GI.      ED course and plan:  Reviewed the medical record.  I reviewed his hospital course from November 25, 2022.  Labs obtained per protocol prior to evaluation revealed a negative urinalysis negative influenza and COVID.  Normal lactate.  Patient had some labs drawn prior to arrival patient's hemoglobin is 9.7 hematocrit is 30.6.  His  creatinine is 1.58 slight elevation from his creatinine on December 14, 2020 20-1.47.  He continues to have elevated liver enzymes AST today is 197, 225 ALT.patient had a biliary ultrasound on 11/30/2022 showing gallbladder sludge without signs of acute cholecystitis and nonspecific dilatation of common bile duct.  However consideration of biliary function studies and follow-up MRCP was advised.  See details in radiology report.    CT imaging of the chest abdomen pelvis with comparison from last imaging from 11/7/2022 was obtained  CT today revealed several bilateral no pulmonary nodules which are indeterminate and radiologist felt that pulmonary metastatic disease is a possibility.  He also noted that the mass abutting the spleen was larger contacting the adjacent left posterior hemidiaphragm left upper kidney.  See details in radiology report.    With CT findings I suspect patient's left-sided abdominal pain may be related to his enlarging splenic mass.  I reviewed patient's work-up in ED with his oncology care team- CHRIS Rios PA-C at 4.06pm. we agreed to MRCP with rising LFT's especially because patient has had a holiday from chemotherapy for about a month.  We also reviewed his last abdominal ultrasound from November 30, 2022.  We reviewed that if MRCP shows no acute findings he could go home with antibiotics with plans for close follow-up with oncology next week.    MRCP revealed mild biliary ductal dilatation.  There is a small region of low signal of the distal common bile duct which could represent gallbladder sludge and extending to this region.  The gallbladder sludge was noted within the gallbladder lumen.  The common bile duct was noted to be 9 mm in size.  A few tiny cystic lesions were noted about the pancreas suggesting small cystic neoplasm.  A 1 year follow-up was recommended for surveillance there is no pancreatic duct dilatation.  A lobulated mass at the spleen was consistent with a  malignancy.  See additional details outlined in the radiology report.    I reviewed patient's MRCP findings with surgery on-call to ensure that patient's MRCP findings could not be a potential source for his fever with possible cholangitis. Spoke with Dr Thakur who advised transfer for consideration of EUS.    Spoke with Dr Metlon- admitting hospitalist at [facility] and at 8 PM who agreed to accept patient for admission. he discussed abnormal liver enzymes and MRCP today and referral for fever and weakness in light of his advanced metastatic cancer.  For further evaluation by GI including consideration of EUS.  we agreed to hold on empiric antimicrobials at this time.    At shift end at 9pm patient was awaiting transfer for further care.  He requested transfer by private vehicle which I think is reasonable given he has remained hemodynamically stable during his ED course.  He expressed that he would have his wife drive him to [facility] for further care.    Disclaimer: This note consists of symbols derived from keyboarding, dictation and/or voice recognition software. As a result, there may be errors in the script that have gone undetected. Please consider this when interpreting information found in this chart.  I have reviewed the nursing notes.    I have reviewed the findings, diagnosis, plan and need for follow up with the patient.       Discharge Medication List as of 12/22/2022  8:56 PM          Final diagnoses:   Elevated liver enzymes   Abnormal magnetic resonance cholangiopancreatography (MRCP)   Acute febrile illness       12/22/2022   [facility] EMERGENCY DEPT     Jose Laery MD  12/22/22 7259

## 2022-12-22 NOTE — ED TRIAGE NOTES
"Here from infusion therapy due to weakness and fever, \"blacked out during night\" infusion sent labs, cultures and started running fluids     Triage Assessment     Row Name 12/22/22 1207       Triage Assessment (Adult)    Airway WDL WDL       Respiratory WDL    Respiratory WDL WDL       Peripheral/Neurovascular WDL    Peripheral Neurovascular WDL neurovascular assessment upper              "

## 2022-12-23 NOTE — ANESTHESIA POSTPROCEDURE EVALUATION
Patient: Ifrah Huitron    Procedure: Procedure(s):  ENDOSCOPIC RETROGRADE CHOLANGIOPANCREATOGRAPHY       Anesthesia Type:  MAC    Note:  Disposition: Outpatient   Postop Pain Control: Uneventful            Sign Out: Well controlled pain   PONV: No   Neuro/Psych: Uneventful            Sign Out: Acceptable/Baseline neuro status   Airway/Respiratory: Uneventful            Sign Out: Acceptable/Baseline resp. status   CV/Hemodynamics: Uneventful            Sign Out: Acceptable CV status; No obvious hypovolemia; No obvious fluid overload   Other NRE: NONE   DID A NON-ROUTINE EVENT OCCUR? No           Last vitals:  Vitals Value Taken Time   /84 12/23/22 1500   Temp 35.8  C (96.5  F) 12/23/22 1450   Pulse 69 12/23/22 1514   Resp 10 12/23/22 1514   SpO2 99 % 12/23/22 1514   Vitals shown include unvalidated device data.    Electronically Signed By: Santino Stauffer MD  December 23, 2022  3:15 PM

## 2022-12-23 NOTE — PHARMACY-ADMISSION MEDICATION HISTORY
Pharmacy Note - Admission Medication History    Pertinent Provider Information: he has been using double his normal hydrocortisone dose over the past few days.      ______________________________________________________________________    Prior To Admission (PTA) med list completed and updated in EMR.       PTA Med List   Medication Sig Note Last Dose     acetaminophen (TYLENOL) 500 MG tablet Take 1,000 mg by mouth every 8 hours as needed for mild pain       amLODIPine (NORVASC) 5 MG tablet Take 1 tablet (5 mg) by mouth daily  12/22/2022     aspirin-acetaminophen-caffeine (EXCEDRIN MIGRAINE) 250-250-65 MG tablet Take 1 tablet by mouth daily as needed for headaches       Aspirin-Caffeine (JUAN A BACK & BODY PO) Take 2 tablets by mouth 2 times daily as needed       celecoxib (CELEBREX) 200 MG capsule Take 1 capsule (200 mg) by mouth 2 times daily  12/22/2022 at am     doxepin (SINEQUAN) 10 MG/ML (HIGH CONC) solution Take 2.5 mLs (25 mg) by mouth 2 times daily as needed (rinse for mucositis) Dilute the 2.5 mL of doxepin to 5 ml total in sterile or distilled water.  12/22/2022 at am     escitalopram (LEXAPRO) 10 MG tablet Take 1 tablet (10 mg) by mouth daily  12/22/2022 at pm     guaiFENesin-codeine (GUAIFENESIN AC) 100-10 MG/5ML syrup Take 10 mLs by mouth every 4 hours as needed for cough       hydrocortisone (CORTEF) 10 MG tablet Take 20 mg in the morning and 10 mg in the afternoon 12/23/2022: He has been taking 40 mg in th am and 20 mg in the evening for past week d/t illness 12/22/2022 at pm     levothyroxine (SYNTHROID/LEVOTHROID) 75 MCG tablet Take 1 tablet (75 mcg) by mouth every morning  12/22/2022 at am     Menthol-Methyl Salicylate (DALIA MALIK GREASELESS) cream Apply topically every 6 hours as needed       nystatin (MYCOSTATIN) 664899 UNIT/ML suspension Swish and spit 500,000 Units in mouth 4 times daily  12/22/2022 at am     omeprazole (PRILOSEC) 20 MG DR capsule Take 40 mg by mouth daily  12/22/2022 at am      ondansetron (ZOFRAN) 4 MG tablet Take 1-2 tablets (4-8 mg) by mouth every 8 hours as needed for nausea       phosphorus tablet 250 mg (PHOSPHA 250 NEUTRAL) 250 MG per tablet Take 1 tablet (250 mg) by mouth 2 times daily (Patient taking differently: Take 250 mg by mouth daily)  12/22/2022 at am     potassium chloride ER (KLOR-CON M) 20 MEQ CR tablet Take 1 tablet (20 mEq) by mouth daily (Patient taking differently: Take 20 mEq by mouth 2 times daily)  12/22/2022 at am     prochlorperazine (COMPAZINE) 5 MG tablet Take 1 tablet (5 mg) by mouth every 6 hours as needed for nausea or vomiting . Caution: causes sedation.       SUMAtriptan (IMITREX) 50 MG tablet Take 1 tablet (50 mg) by mouth at onset of headache for migraine May repeat in 2 hours. Max 4 tablets/24 hours.       triamcinolone (KENALOG) 0.1 % external cream Apply topically 2 times daily to bothersome skin areas. (Patient taking differently: Apply topically 2 times daily as needed to bothersome skin areas.)         Information source(s): Patient, Clinic records and [facility]  Method of interview communication: in-person    Summary of Changes to PTA Med List  New: amlodipine   Discontinued: clotrimazole myron  Changed: potassium, K-phos    Patient was asked about OTC/herbal products specifically.  PTA med list reflects this.    In the past week, patient estimated taking medication this percent of the time:  greater than 90%.    Allergies were reviewed, assessed, and updated with the patient.      Patient does not use any multi-dose medications prior to admission.    The information provided in this note is only as accurate as the sources available at the time of the update(s).    Thank you for the opportunity to participate in the care of this patient.    Alexis Castano [facility]  12/23/2022 7:34 AM

## 2022-12-23 NOTE — H&P
[facility]    History and Physical - Hospitalist Service       Date of Admission:  12/22/2022    Assessment & Plan      Ifrah Huitron is a 72 year old male with past medical history significant for metastatic spindle cell sarcoma, malignant mesothelioma, mediastinal lymphadenopathy, left vocal fold paralysis, central hypothyroidism, GERD, kidney stones, and DJD who was admitted on 12/22/2022 as a transfer from [facility]. He was found to have fever, weakness, and an episode of presyncope 1 day prior to arrival.  He was seen by his infusion center and noted to have elevated LFTs and told to come to the ED, after which she was transferred here.    Presyncope, weakness  Fever-resolved  Elevated LFTs  -Abnormal MRCP 12/22: Mild biliary ductal dilation, possible gallbladder sludge in the distal common bile duct, gallbladder sludge within the gallbladder, common bile duct 9 mm  -Will monitor LFTs  -Has angioedema and hives from penicillins, will start on meropenem, has possible crossover reactions so hypersensitivity protocol is in place  -Started on meropenem due to severe penicillin allergy  -GI consulted    Metastatic spindle cell sarcoma  -Diagnosed in March 2021, follows outpatient with the [facility] in [facility] [place]  -His chemotherapy has been on hold for the past month due to associated toxicities  -Mediastinal mass, known lung metastases, splenic mass, liver metastases  -CT 12/22: Several new pulmonary nodules, slightly larger mass involving the spleen, stable anterior mediastinal mass    New pancreatic masses  -MRCP 12/22: A few tiny cystic lesions in the pancreas suggesting small cystic neoplasms  -Recommend 1 year follow-up with MRI for surveillance    Essential hypertension  -Continue PTA amlodipine 5 mg daily    Hypopituitarism  Hypothyroidism  -Continue PTA hydrocortisone, Synthroid    Depression  -Continue PTA Lexapro     Diet: Regular  Diet Adult  DVT Prophylaxis: Pneumatic Compression Devices  Bonner Catheter: Not present  Central Lines: PRESENT       Cardiac Monitoring: None  Code Status: Full Code    Clinically Significant Risk Factors Present on Admission                               Disposition Plan      Expected Discharge Date: 12/23/2022                The patient's care was discussed with the Patient.    Deidre Melton MD  Hospitalist Service  [facility]  Securely message with the Vocera Web Console (learn more here)  Text page via [employer] Paging/Directory         ______________________________________________________________________    Chief Complaint   elevated LFTs    History is obtained from the patient    History of Present Illness   [place] NORMA Huitron is a 72 year old male with past medical history significant for metastatic spindle cell sarcoma, malignant mesothelioma, mediastinal lymphadenopathy, left vocal fold paralysis, central hypothyroidism, GERD, kidney stones, and DJD who was admitted on 12/22/2022 as a transfer from [facility]. He was found to have fever, weakness, and an episode of presyncope 1 day prior to arrival.  He was seen by his infusion center and noted to have elevated LFTs and told to come to the ED, after which she was transferred here.  He has denied abdominal pain other than on the right side which he attributes to his cancer.    Review of Systems    The 10 point Review of Systems is negative other than noted in the HPI.    Past Medical History    I have reviewed this patient's medical history and updated it with pertinent information if needed.   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  Past Surgical  History:   Procedure Laterality Date     COLONOSCOPY N/A 12/17/2020    Procedure: COLONOSCOPY;  Surgeon: Ken Camacho MD;  Location: [facility]     ENT SURGERY       HERNIA REPAIR       INSERT PORT VASCULAR ACCESS Right 1/28/2022    Procedure: INSERTION, VASCULAR ACCESS PORT;  Surgeon: Daniel Kinney MD;  Location: [facility]     IR CHEST PORT PLACEMENT > 5 YRS OF AGE  1/28/2022     LARYNGOSCOPY, EXCISE VOCAL CORD LESION MICROSCOPIC, COMBINED Left 07/01/2021    Procedure: MICROLARYNGOSCOPY, LEFT TRUE VOCAL CORD INJECTION WITH PROLARYN;  Surgeon: Kyree Bearden MD;  Location: [facility]     PHACOEMULSIFICATION WITH STANDARD INTRAOCULAR LENS IMPLANT Right 03/10/2021    Procedure: Cataract removal with implant.;  Surgeon: Jamir Mac MD;  Location: [facility]     PHACOEMULSIFICATION WITH STANDARD INTRAOCULAR LENS IMPLANT Left 04/05/2021    Procedure: Cataract removal with implant.;  Surgeon: Jamir Mac MD;  Location: [facility]     PICC DOUBLE LUMEN PLACEMENT Right 12/01/2021    5FR DL PICC, basilic vein. L-38cm, 1cm out.     PICC DOUBLE LUMEN PLACEMENT Right 01/04/2022    Right cephalic, 41 cm, 1 external length     PITUITARY EXCISION       tooth pulled 4/7  Right        Social History   I have reviewed this patient's social history and updated it with pertinent information if needed.  Social History     Tobacco Use     Smoking status: Never     Smokeless tobacco: Never   Substance Use Topics     Alcohol use: Yes     Comment: rare     Drug use: Never       Family History   I have reviewed this patient's family history and updated it with pertinent information if needed.  Family History   Problem Relation Age of Onset     Lupus Mother      ALS Father      Rheumatoid Arthritis Sister        Prior to Admission Medications   Prior to Admission Medications   Prescriptions Last Dose Informant Patient Reported? Taking?   Aspirin-Caffeine (JUAN A BACK & BODY PO)  Self Yes No   Sig: Take 2 tablets by mouth   Insulin  "Syringe-Needle U-100 27.5G X 5/8\" 2 ML MISC  Self No No   Si each daily as needed (with solucortef for adrenal crisis)   Menthol-Methyl Salicylate (DALIA MALIK GREASELESS) cream  Self Yes No   Sig: Apply topically 2 times daily   SUMAtriptan (IMITREX) 50 MG tablet  Self No No   Sig: Take 1 tablet (50 mg) by mouth at onset of headache for migraine May repeat in 2 hours. Max 4 tablets/24 hours.   Patient taking differently: Take 50 mg by mouth at onset of headache for migraine May repeat in 2 hours. Max 4 tablets/24 hours. PRN   acetaminophen (TYLENOL) 500 MG tablet  Self Yes No   Sig: Take 1,000 mg by mouth every 8 hours as needed for mild pain   amLODIPine (NORVASC) 5 MG tablet  Self No No   Sig: Take 1 tablet (5 mg) by mouth daily   aspirin-acetaminophen-caffeine (EXCEDRIN MIGRAINE) 250-250-65 MG tablet  Self Yes No   Sig: Take 1 tablet by mouth daily as needed for headaches   celecoxib (CELEBREX) 200 MG capsule  Self No No   Sig: Take 1 capsule (200 mg) by mouth 2 times daily   clotrimazole (MYCELEX) 10 MG lozenge  Self No No   Sig: Place 1 lozenge (10 mg) inside cheek 5 times daily   Patient not taking: Reported on 2022   doxepin (SINEQUAN) 10 MG/ML (HIGH CONC) solution  Self No No   Sig: Take 2.5 mLs (25 mg) by mouth 2 times daily as needed (rinse for mucositis) Dilute the 2.5 mL of doxepin to 5 ml total in sterile or distilled water.   escitalopram (LEXAPRO) 10 MG tablet  Self No No   Sig: Take 1 tablet (10 mg) by mouth daily   guaiFENesin-codeine (GUAIFENESIN AC) 100-10 MG/5ML syrup  Self No No   Sig: Take 10 mLs by mouth every 4 hours as needed for cough   hydrocortisone (CORTEF) 10 MG tablet  Self No No   Sig: Take 20 mg in the morning and 10 mg in the afternoon   Patient taking differently: Take 40 mg by mouth Take 20 mg in the morning and 10 mg in the afternoon   levothyroxine (SYNTHROID/LEVOTHROID) 75 MCG tablet  Self No No   Sig: Take 1 tablet (75 mcg) by mouth every morning   metroNIDAZOLE " (METROGEL) 1 % external gel  Self No No   Sig: Apply topically daily Try stronger dose due to increased symptoms after chemo.   omeprazole (PRILOSEC) 20 MG DR capsule  Self Yes No   Sig: Take 1 capsule (20 mg) by mouth 2 times daily   ondansetron (ZOFRAN) 4 MG tablet  Self No No   Sig: Take 1-2 tablets (4-8 mg) by mouth every 8 hours as needed for nausea   phosphorus tablet 250 mg (PHOSPHA 250 NEUTRAL) 250 MG per tablet  Self No No   Sig: Take 1 tablet (250 mg) by mouth 2 times daily   potassium chloride ER (KLOR-CON M) 20 MEQ CR tablet  Self No No   Sig: Take 1 tablet (20 mEq) by mouth daily   prochlorperazine (COMPAZINE) 5 MG tablet  Self No No   Sig: Take 1 tablet (5 mg) by mouth every 6 hours as needed for nausea or vomiting . Caution: causes sedation.   triamcinolone (KENALOG) 0.1 % external cream  Self No No   Sig: Apply topically 2 times daily to bothersome skin areas.      Facility-Administered Medications: None     Allergies   Allergies   Allergen Reactions     Penicillins Hives and Swelling              Physical Exam   Vital Signs: Temp: 97.6  F (36.4  C) Temp src: Oral BP: (!) 152/86 Pulse: 60   Resp: 15 SpO2: 98 % O2 Device: None (Room air)    Weight: 137 lbs 12.8 oz    Constitutional: awake, alert, cooperative, no apparent distress  Eyes: Lids and lashes normal, pupils equal, round, extra ocular muscles intact, sclera clear, conjunctiva normal  Respiratory: No increased work of breathing, good air exchange, clear to auscultation bilaterally, no crackles or wheezing  Cardiovascular: Normal apical impulse, regular rate and rhythm, and no murmur noted  GI: normal bowel sounds, soft, non-distended, non-tender  Skin: normal skin color, texture, turgor, no rashes and no jaundice  Musculoskeletal: no lower extremity pitting edema present  tone is normal, no weakness noted  Neurologic: Awake, alert, no gross focal abnormalities   Neuropsychiatric: Appropriate mood and affect    Data   Data reviewed today: I  reviewed all medications, new labs and imaging results over the last 24 hours. I personally reviewed no images or EKG's today.    Recent Labs   Lab 12/22/22  1053   WBC 8.0   HGB 9.7*   MCV 97   *      POTASSIUM 3.9   CHLORIDE 102   CO2 25   BUN 30.8*   CR 1.58*   ANIONGAP 10   COTY 9.3   *   ALBUMIN 3.6   PROTTOTAL 6.5   BILITOTAL 0.5   ALKPHOS 762*   *   *     Recent Results (from the past 24 hour(s))   CT Chest/Abdomen/Pelvis w Contrast    Narrative    CT CHEST/ABDOMEN/PELVIS WITH CONTRAST 12/22/2022 2:13 PM    CLINICAL HISTORY: Near syncopal spell. Fever, weakness, elevated liver  enzymes. 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 Last imaging  11/7/2022. Evaluate for interval change.    TECHNIQUE: CT scan of the chest, abdomen, and pelvis was performed  following injection of IV contrast. Multiplanar reformats were  obtained. Dose reduction techniques were used.   CONTRAST: 68 mL Isovue 370    COMPARISON: CT chest, abdomen and pelvis 11/7/2022.    FINDINGS:   LUNGS AND PLEURA: No effusions. No pneumothorax. New indeterminant 0.5  cm pulmonary nodule medial right lower lobe series 3 image 184. New  subpleural right upper lobe lateral nodule measuring 0.2 cm, image 85.  There are multiple additional examples of new small nodules  bilaterally, some with a groundglass character, for example, image 126  and others appearing small and solid clustered together, image 165.    MEDIASTINUM/AXILLAE: Stable mass at the anterior mediastinum and  medial left upper lobe region that is 3.7 x 2.7 cm series 2 image 62.  Right chest Port-A-Cath. Stable small mediastinal lymph nodes. No  acute mediastinal abnormality. Stable small nodule at the anterior  left pericardial fat measuring 0.7 cm image 85.    CORONARY ARTERY CALCIFICATION: Mild.    HEPATOBILIARY: Several small hypodensities in the liver are stable.  Some are  suggestive of cysts, but others are too small for  characterization. No new liver lesion identified. Gallbladder shows no  acute abnormality.    PANCREAS: Normal.    SPLEEN: Lobulated splenic mass is slightly larger measuring 9.3 x 8.4  cm, previously 8.9 x 7.9 cm image 129. This contacts the posterior  left hemidiaphragm. A portion of this also contacts the upper left  kidney.    ADRENAL GLANDS: Normal.    KIDNEYS/BLADDER: Stable bilateral renal cysts without specific imaging  follow-up recommended. No hydronephrosis or stone. Bladder is  unremarkable.    BOWEL: Colonic diverticula. No acute bowel abnormality. Normal  appendix.    PELVIC ORGANS: Prostate is 4.9 cm.    ADDITIONAL FINDINGS: No new adenopathy identified.    MUSCULOSKELETAL: Stable small sclerotic foci within the bilateral  pelvis, right femoral neck, left femoral head. No new bone lesion  identified.      Impression    IMPRESSION:  1.  Several bilateral new pulmonary nodules identified. These are  indeterminant. New pulmonary metastatic disease is a possibility,  though this could be infectious or inflammatory nodules. As such,  recommend short interval follow-up CT chest in three months.  2.  Slightly larger mass involving the spleen and contacting the  adjacent left posterior hemidiaphragm and left upper kidney.  3.  Stable size of an anterior mediastinal mass.  4.  Small hypodensities in the liver are stable. Many of these appear  to represent cysts, but others are too small for characterization.    AMBER JASON MD         SYSTEM ID:  [id number]   Abdomen MRI w & w/o contrast mrcp    Narrative    EXAM: MR ABDOMEN MRCP W/O and W CONTRAST  LOCATION: [facility]  DATE/TIME: 12/22/2022 5:49 PM    INDICATION: Elevated liver enzymes. Abnormal ultrasound on 11/30/2022. Fever, history of metastatic spindle cell carcinoma. Evaluate for acute hepatobiliary process.    COMPARISON: CT of chest, abdomen and pelvis 12/22/2022. Ultrasound  abdomen 11/30/2022.    TECHNIQUE: Routine MR liver/pancreas protocol including axial and coronal MRCP sequences. 2D and 3D reconstruction performed by MR technologist including MIP reconstruction and slab cholangiograms. If performed with contrast, additional dynamic T1 post   IV contrast images.    CONTRAST: 7 mL Gadavist.    FINDINGS:   MRCP: Gallbladder sludge identified. No convincing acute inflammatory change of the gallbladder wall on the supplied MRI images. There is mild biliary ductal dilatation diffusely. Common bile duct is 9 mm. There is small ill-defined low signal within the   distal common bile duct for example series 3, image 19 and on series 4, image 20. Also see series 6, image 10.     LIVER: There are multiple right and left hepatic simple cysts that are very small in size without worrisome features. No worrisome hepatic lesion identified.    PANCREAS: No solid pancreas lesion. The T2-weighted images show a few tiny cystic pancreas lesions without worrisome features. One of these at the uncinate measures 0.3 cm series 3, image 23. Another at the pancreas tail measures 0.3 cm series 3, image   25. No main pancreatic duct dilatation.    ADDITIONAL FINDINGS: Large lobulated mass occupying the spleen again noted and described on the comparison CT. It measures approximately 9 x 8.1 cm and contacts the posterior left hemidiaphragm, and contacts the upper left kidney. Multiple bilateral   renal cysts appear simple without specific imaging follow-up recommended. No hydronephrosis. Normal adrenals.      Impression    IMPRESSION:  1.  Mild biliary ductal dilatation. Small region of low signal at the distal common bile duct could represent gallbladder sludge extending to this region. There is gallbladder sludge noted within the gallbladder lumen as well. The common bile duct   measures 9 mm.  2.  A few tiny cystic lesions at the pancreas suggesting small cystic neoplasms. One year follow-up MRI is  recommended for surveillance. No pancreas duct dilatation.  3.  Lobulated mass at the spleen consistent with malignancy again identified.  4.  Several small simple hepatic cysts without worrisome features.

## 2022-12-23 NOTE — ED NOTES
Report to Andree GREEN pt going by private car, port is accessed and stable, pt's wife will be driving him, [employer] notified

## 2022-12-23 NOTE — PLAN OF CARE
"  Problem: Plan of Care - These are the overarching goals to be used throughout the patient stay.    Goal: Patient-Specific Goal (Individualized)  Description: You can add care plan individualizations to a care plan. Examples of Individualization might be:  \"Parent requests to be called daily at 9am for status\", \"I have a hard time hearing out of my right ear\", or \"Do not touch me to wake me up as it startles me\".  Outcome: Progressing     Problem: Plan of Care - These are the overarching goals to be used throughout the patient stay.    Goal: Absence of Hospital-Acquired Illness or Injury  Outcome: Progressing  Intervention: Identify and Manage Fall Risk  Recent Flowsheet Documentation  Taken 12/23/2022 0000 by Eli Corley RN  Safety Promotion/Fall Prevention: bed alarm on  Intervention: Prevent Skin Injury  Recent Flowsheet Documentation  Taken 12/23/2022 0000 by Eli Corley RN  Body Position: position changed independently   Goal Outcome Evaluation:       Pt is A/O X4, denies pain , is on room air, vitals are stable, independent in the room, BP slightly high, other vitals are WNL, port A catheter on the right upper chest, site is clean and intact.                 "

## 2022-12-23 NOTE — ANESTHESIA CARE TRANSFER NOTE
Patient: Ifrah Huitron    Procedure: Procedure(s):  ENDOSCOPIC RETROGRADE CHOLANGIOPANCREATOGRAPHY       Diagnosis: Elevated LFTs [R79.89]  Abnormal magnetic resonance cholangiopancreatography (MRCP) [R93.3]  Diagnosis Additional Information: No value filed.    Anesthesia Type:   MAC     Note:    Oropharynx: oropharynx clear of all foreign objects and spontaneously breathing  Level of Consciousness: awake  Oxygen Supplementation: face mask  Level of Supplemental Oxygen (L/min / FiO2): 6  Independent Airway: airway patency satisfactory and stable  Dentition: dentition unchanged  Vital Signs Stable: post-procedure vital signs reviewed and stable  Report to RN Given: handoff report given  Patient transferred to: PACU    Handoff Report: Identifed the Patient, Identified the Reponsible Provider, Reviewed the pertinent medical history, Discussed the surgical course, Reviewed Intra-OP anesthesia mangement and issues during anesthesia, Set expectations for post-procedure period and Allowed opportunity for questions and acknowledgement of understanding      Vitals:  Vitals Value Taken Time   /84 12/23/22 1450   Temp 35.8  C (96.5  F) 12/23/22 1450   Pulse 73 12/23/22 1451   Resp 12 12/23/22 1451   SpO2 100 % 12/23/22 1451   Vitals shown include unvalidated device data.    Electronically Signed By: JEVON Baldwin CRNA  December 23, 2022  2:52 PM

## 2022-12-23 NOTE — PLAN OF CARE
"  Problem: Plan of Care - These are the overarching goals to be used throughout the patient stay.    Goal: Optimal Comfort and Wellbeing  Outcome: Progressing     Problem: Pain Acute  Goal: Optimal Pain Control and Function  Outcome: Progressing  Intervention: Prevent or Manage Pain  Recent Flowsheet Documentation  Taken 12/23/2022 0804 by Felicia Freeman RN  Medication Review/Management: medications reviewed     Problem: Risk for Delirium  Goal: Improved Attention and Thought Clarity  Outcome: Progressing  Intervention: Maximize Cognitive Function  Recent Flowsheet Documentation  Taken 12/23/2022 0804 by Felicia Freeman RN  Reorientation Measures: clock in view   Goal Outcome Evaluation:       Patient denies any pain. States he is feeling better since he \"got his first dose of antibiotic\" Receives IV Meropenem every 12 hours. Has been NPO for GI procedure ERCP, went down at 12:30 for 2:20 time.                  "

## 2022-12-23 NOTE — CONSULTS
GI CONSULT NOTE    Name: Ifrah Huitron  Medical Record #: [id number]  YOB: 1950  Date of Admission: 12/22/2022  Date/Time: 12/23/2022/9:09 AM     CHIEF COMPLAINT: Abnormal MRCP    HISTORY OF PRESENT ILLNESS: We were asked to see Ifrah Huitron by Dr. Melton for abnormal MRCP.     Ifrah Huitron is a 72 year old year old male with history of metastatic spindle cell sarcoma, malignant mesothelioma, mediastinal lymphadenopathy, left vocal fold paralysis, central hypothyroidism, GERD, kidney stones, and DJD who was admitted on 12/22/2022 for fever, weakness, and elevated LFTs.    Patient reports chronic left sided abdominal pain that wraps around to his back, does state the last week this pain has been increased in severity. Denies any associated nausea or vomiting. Does note weakness, fatigue, dizziness, fever, and chills.     Has known metastatic spindle cell sarcoma, patient states that due to side effects from chemotherapy this has been on hold for the last month. Denies any other changes in medication therapy.     MRCP 12/22/22- Mild biliary ductal dilatation. Small region of low signal at the distal common bile duct could represent gallbladder sludge extending to this region. There is gallbladder sludge noted within the gallbladder lumen as well. The common bile duct measures 9 mm.  Incidental findings of a few tiny cystic lesions at the pancreas suggesting small cystic neoplasms.     REVIEW OF SYSTEMS (ROS): Complete review of systems negative other than listed in HPI.    PAST MEDICAL HISTORY:  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     Thyroid disease     removed pituitary gland      FAMILY HISTORY:  Family History   Problem Relation Age of Onset     Lupus Mother      ALS Father      Rheumatoid Arthritis Sister      SOCIAL HISTORY:  Social History     Socioeconomic History     Marital status:       Spouse name: Not on file     Number of children: Not on file     Years of education: Not on file     Highest education level: Not on file   Occupational History     Not on file   Tobacco Use     Smoking status: Never     Smokeless tobacco: Never   Substance and Sexual Activity     Alcohol use: Yes     Comment: rare     Drug use: Never     Sexual activity: Not on file   Other Topics Concern     Parent/sibling w/ CABG, MI or angioplasty before 65F 55M? Not Asked   Social History Narrative     Not on file     Social Determinants of Health     Financial Resource Strain: Low Risk      Difficulty of Paying Living Expenses: Not very hard   Food Insecurity: No Food Insecurity     Worried About Running Out of Food in the Last Year: Never true     Ran Out of Food in the Last Year: Never true   Transportation Needs: No Transportation Needs     Lack of Transportation (Medical): No     Lack of Transportation (Non-Medical): No   Physical Activity: Not on file   Stress: Not on file   Social Connections: Not on file   Intimate Partner Violence: Unknown     Fear of Current or Ex-Partner: No     Emotionally Abused: No     Physically Abused: Not on file     Sexually Abused: No   Housing Stability: Not on file       MEDICATIONS PRIOR TO ADMISSION:   Medications Prior to Admission   Medication Sig Dispense Refill Last Dose     acetaminophen (TYLENOL) 500 MG tablet Take 1,000 mg by mouth every 8 hours as needed for mild pain        amLODIPine (NORVASC) 5 MG tablet Take 1 tablet (5 mg) by mouth daily 30 tablet 3 12/22/2022     aspirin-acetaminophen-caffeine (EXCEDRIN MIGRAINE) 250-250-65 MG tablet Take 1 tablet by mouth daily as needed for headaches        Aspirin-Caffeine (JUAN A BACK & BODY PO) Take 2 tablets by mouth 2 times daily as needed        celecoxib (CELEBREX) 200 MG capsule Take 1 capsule (200 mg) by mouth 2 times daily 60 capsule 3 12/22/2022 at am     doxepin (SINEQUAN) 10 MG/ML (HIGH CONC) solution Take 2.5 mLs (25 mg) by  mouth 2 times daily as needed (rinse for mucositis) Dilute the 2.5 mL of doxepin to 5 ml total in sterile or distilled water. 118 mL 0 12/22/2022 at am     escitalopram (LEXAPRO) 10 MG tablet Take 1 tablet (10 mg) by mouth daily 90 tablet 3 12/22/2022 at pm     guaiFENesin-codeine (GUAIFENESIN AC) 100-10 MG/5ML syrup Take 10 mLs by mouth every 4 hours as needed for cough 118 mL 0      hydrocortisone (CORTEF) 10 MG tablet Take 20 mg in the morning and 10 mg in the afternoon 270 tablet 3 12/22/2022 at pm     levothyroxine (SYNTHROID/LEVOTHROID) 75 MCG tablet Take 1 tablet (75 mcg) by mouth every morning 90 tablet 3 12/22/2022 at am     Menthol-Methyl Salicylate (DALIA MALIK GREASELESS) cream Apply topically every 6 hours as needed        nystatin (MYCOSTATIN) 760773 UNIT/ML suspension Swish and spit 500,000 Units in mouth 4 times daily   12/22/2022 at am     omeprazole (PRILOSEC) 20 MG DR capsule Take 40 mg by mouth daily 60 capsule 1 12/22/2022 at am     ondansetron (ZOFRAN) 4 MG tablet Take 1-2 tablets (4-8 mg) by mouth every 8 hours as needed for nausea 60 tablet 3      phosphorus tablet 250 mg (PHOSPHA 250 NEUTRAL) 250 MG per tablet Take 1 tablet (250 mg) by mouth 2 times daily (Patient taking differently: Take 250 mg by mouth daily) 60 tablet 3 12/22/2022 at am     potassium chloride ER (KLOR-CON M) 20 MEQ CR tablet Take 1 tablet (20 mEq) by mouth daily (Patient taking differently: Take 20 mEq by mouth 2 times daily) 90 tablet 3 12/22/2022 at am     prochlorperazine (COMPAZINE) 5 MG tablet Take 1 tablet (5 mg) by mouth every 6 hours as needed for nausea or vomiting . Caution: causes sedation. 30 tablet 1      SUMAtriptan (IMITREX) 50 MG tablet Take 1 tablet (50 mg) by mouth at onset of headache for migraine May repeat in 2 hours. Max 4 tablets/24 hours. 10 tablet 3      triamcinolone (KENALOG) 0.1 % external cream Apply topically 2 times daily to bothersome skin areas. (Patient taking differently: Apply topically 2  "times daily as needed to bothersome skin areas.) 30 g 3      clotrimazole (MYCELEX) 10 MG lozenge Place 1 lozenge (10 mg) inside cheek 5 times daily (Patient not taking: Reported on 9/27/2022) 90 lozenge 1      Insulin Syringe-Needle U-100 27.5G X 5/8\" 2 ML MISC 1 each daily as needed (with solucortef for adrenal crisis) 10 each 1      metroNIDAZOLE (METROGEL) 1 % external gel Apply topically daily Try stronger dose due to increased symptoms after chemo. (Patient not taking: Reported on 12/23/2022) 30 g 0 Not Taking        ALLERGIES: Penicillins    PHYSICAL EXAM:    BP (!) 151/83 (BP Location: Right arm)   Pulse 70   Temp 97.8  F (36.6  C) (Oral)   Resp 18   Wt 62.5 kg (137 lb 12.8 oz)   SpO2 97%   BMI 21.58 kg/m      GENERAL: Pleasant, no obvious distress  NECK: Supple without adenopathy  EYES: No scleral icterus  LUNGS: Clear to auscultation bilaterally  HEART: Regular rate and rhythm, S1 and S2 present, no lower extremity edema  ABDOMEN: Non-distended. Positive bowel sounds. Soft, tenderness in LUQ, no guarding/rebound/mass, no obvious organomegaly  MUSKULOSKELETAL:  Warm and well perfused, moves all extremities well  SKIN: No jaundice  NEUROLOGIC: Alert and oriented  PSYCHIATRIC: Normal affect    LAB DATA:  CMP Results:   Recent Labs   Lab Test 12/23/22  0545 12/22/22  1053 12/14/22  0926    137 141   POTASSIUM 4.3 3.9 3.8   CHLORIDE 103 102 105   CO2 23 25 25   ANIONGAP 11 10 11   * 107* 87   BUN 33.6* 30.8* 23.4*   CR 1.50* 1.58* 1.47*   BILITOTAL 0.3 0.5 0.5   ALKPHOS 527* 762* 424*   * 225* 113*   AST 73* 197* 50      CBC  Recent Labs   Lab 12/23/22  0545 12/22/22  1053   WBC 6.7 8.0   RBC 2.70* 3.16*   HGB 8.2* 9.7*   HCT 26.1* 30.6*   MCV 97 97   MCH 30.4 30.7   MCHC 31.4* 31.7   RDW 18.0* 18.4*   * 143*     INRNo lab results found in last 7 days.   Lipase   Date Value Ref Range Status   09/18/2022 10 (L) 13 - 60 U/L Final   03/29/2021 79 73 - 393 U/L Final "       IMAGING:  EXAM: MR ABDOMEN MRCP W/O and W CONTRAST  LOCATION: [facility]  DATE/TIME: 12/22/2022 5:49 PM     INDICATION: Elevated liver enzymes. Abnormal ultrasound on 11/30/2022. Fever, history of metastatic spindle cell carcinoma. Evaluate for acute hepatobiliary process.     COMPARISON: CT of chest, abdomen and pelvis 12/22/2022. Ultrasound abdomen 11/30/2022.     TECHNIQUE: Routine MR liver/pancreas protocol including axial and coronal MRCP sequences. 2D and 3D reconstruction performed by MR technologist including MIP reconstruction and slab cholangiograms. If performed with contrast, additional dynamic T1 post   IV contrast images.     CONTRAST: 7 mL Gadavist.     FINDINGS:   MRCP: Gallbladder sludge identified. No convincing acute inflammatory change of the gallbladder wall on the supplied MRI images. There is mild biliary ductal dilatation diffusely. Common bile duct is 9 mm. There is small ill-defined low signal within the   distal common bile duct for example series 3, image 19 and on series 4, image 20. Also see series 6, image 10.      LIVER: There are multiple right and left hepatic simple cysts that are very small in size without worrisome features. No worrisome hepatic lesion identified.     PANCREAS: No solid pancreas lesion. The T2-weighted images show a few tiny cystic pancreas lesions without worrisome features. One of these at the uncinate measures 0.3 cm series 3, image 23. Another at the pancreas tail measures 0.3 cm series 3, image   25. No main pancreatic duct dilatation.     ADDITIONAL FINDINGS: Large lobulated mass occupying the spleen again noted and described on the comparison CT. It measures approximately 9 x 8.1 cm and contacts the posterior left hemidiaphragm, and contacts the upper left kidney. Multiple bilateral renal cysts appear simple without specific imaging follow-up recommended. No hydronephrosis. Normal adrenals.                                                                    IMPRESSION:  1.  Mild biliary ductal dilatation. Small region of low signal at the distal common bile duct could represent gallbladder sludge extending to this region. There is gallbladder sludge noted within the gallbladder lumen as well. The common bile duct measures 9 mm.  2.  A few tiny cystic lesions at the pancreas suggesting small cystic neoplasms. One year follow-up MRI is recommended for surveillance. No pancreas duct dilatation.  3.  Lobulated mass at the spleen consistent with malignancy again identified.  4.  Several small simple hepatic cysts without worrisome features    CT CHEST/ABDOMEN/PELVIS WITH CONTRAST 12/22/2022 2:13 PM     CLINICAL HISTORY: Near syncopal spell. Fever, weakness, elevated liver  enzymes. 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 Last imaging  11/7/2022. Evaluate for interval change.     TECHNIQUE: CT scan of the chest, abdomen, and pelvis was performed  following injection of IV contrast. Multiplanar reformats were  obtained. Dose reduction techniques were used.   CONTRAST: 68 mL Isovue 370     COMPARISON: CT chest, abdomen and pelvis 11/7/2022.     FINDINGS:   LUNGS AND PLEURA: No effusions. No pneumothorax. New indeterminant 0.5  cm pulmonary nodule medial right lower lobe series 3 image 184. New  subpleural right upper lobe lateral nodule measuring 0.2 cm, image 85.  There are multiple additional examples of new small nodules  bilaterally, some with a groundglass character, for example, image 126  and others appearing small and solid clustered together, image 165.     MEDIASTINUM/AXILLAE: Stable mass at the anterior mediastinum and  medial left upper lobe region that is 3.7 x 2.7 cm series 2 image 62.  Right chest Port-A-Cath. Stable small mediastinal lymph nodes. No  acute mediastinal abnormality. Stable small nodule at the anterior  left pericardial fat measuring 0.7 cm  image 85.     CORONARY ARTERY CALCIFICATION: Mild.     HEPATOBILIARY: Several small hypodensities in the liver are stable.  Some are suggestive of cysts, but others are too small for  characterization. No new liver lesion identified. Gallbladder shows no  acute abnormality.     PANCREAS: Normal.     SPLEEN: Lobulated splenic mass is slightly larger measuring 9.3 x 8.4  cm, previously 8.9 x 7.9 cm image 129. This contacts the posterior  left hemidiaphragm. A portion of this also contacts the upper left  kidney.     ADRENAL GLANDS: Normal.     KIDNEYS/BLADDER: Stable bilateral renal cysts without specific imaging  follow-up recommended. No hydronephrosis or stone. Bladder is  unremarkable.     BOWEL: Colonic diverticula. No acute bowel abnormality. Normal  appendix.     PELVIC ORGANS: Prostate is 4.9 cm.     ADDITIONAL FINDINGS: No new adenopathy identified.     MUSCULOSKELETAL: Stable small sclerotic foci within the bilateral  pelvis, right femoral neck, left femoral head. No new bone lesion  identified.                                                                    IMPRESSION:  1.  Several bilateral new pulmonary nodules identified. These are  indeterminant. New pulmonary metastatic disease is a possibility,  though this could be infectious or inflammatory nodules. As such,  recommend short interval follow-up CT chest in three months.  2.  Slightly larger mass involving the spleen and contacting the  adjacent left posterior hemidiaphragm and left upper kidney.  3.  Stable size of an anterior mediastinal mass.  4.  Small hypodensities in the liver are stable. Many of these appear  to represent cysts, but others are too small for characterizatio    ASSESSMENT:    1. Abnormal MRCP  2. Fever  3. Abdominal pain  4. Elevated LFTs  72 year old male with history of metastatic spindle cell sarcoma, malignant mesothelioma, mediastinal lymphadenopathy, left vocal fold paralysis, central hypothyroidism, GERD, kidney stones,  and DJD who was admitted on 12/22/2022 for fever, weakness, and elevated LFTs.   -MRCP 12/22/22- Mild biliary ductal dilatation. Small region of low signal at the distal common bile duct could represent gallbladder sludge extending to this region. There is gallbladder sludge noted within the gallbladder lumen as well. The common bile duct measures 9 mm.  Incidental findings of a few tiny cystic lesions at the pancreas suggesting small cystic neoplasms.   - TBilli normal  - Alk Phos 527, AST 73,   - WBC normal. Now afebrile. Hemodynamically stable at this time.     PLAN:    1. NPO  2. ERCP today   3. Continue to trend LFTs  4. Supportive cares per primary   5. Surgery consult   6. GI will continue to follow, please call with questions and concerns.     Discussed with Dr. Manning who will also visit with the patient. Also discussed with Biliary MD Dr. Francois and Dr. Lamar    TIME SPENT: 60 min including chart review, patient interview and care coordination.                                                Melanie Swartz CNP  Thank you for the opportunity to participate in the care of this patient.   Please feel free to call me with any questions or concerns.  Phone number [phone].            _______________________________________________________________  CHRISTIAN Attending  Patient is seen anddiscussed with the PA/CNP, see note above.  Abdominal pain and fever, MRCP shows debris and common bile duct  Exam: Abdomen epigastric tenderness mild  Labs and imaging reviewed.  Assessment/Plan: Likely choledocholithiasis causing pain and fever.  ERCP today.  General surgery consultation for cholecystectomy.  Patient is doing well postop.    Approximately 20 minutes of total time was spent providing patient care, including patient evaluation, reviewing documentation/test results, coordination of care with other providers, and .    Chato Manning MD  [facility]  Office:  [phone]  Cell:[phone]

## 2022-12-23 NOTE — ANESTHESIA PREPROCEDURE EVALUATION
Anesthesia Pre-Procedure Evaluation    Patient: Ifrah Huitron   MRN: [id number] : 1950        Procedure : Procedure(s):  ENDOSCOPIC RETROGRADE CHOLANGIOPANCREATOGRAPHY          Past Medical History:   Diagnosis Date     Arthritis      Mesothelioma, malignant (H) 2021     Spindle cell sarcoma (H) 2021     Thyroid disease     removed pituitary gland      Past Surgical History:   Procedure Laterality Date     COLONOSCOPY N/A 2020    Procedure: COLONOSCOPY;  Surgeon: Ken Camacho MD;  Location: [facility]     ENT SURGERY       HERNIA REPAIR       INSERT PORT VASCULAR ACCESS Right 2022    Procedure: INSERTION, VASCULAR ACCESS PORT;  Surgeon: Daniel Kinney MD;  [place] [facility]     IR CHEST PORT PLACEMENT > 5 YRS OF AGE  2022     LARYNGOSCOPY, EXCISE VOCAL CORD LESION MICROSCOPIC, COMBINED Left 2021    Procedure: MICROLARYNGOSCOPY, LEFT TRUE VOCAL CORD INJECTION WITH PROLARYN;  Surgeon: Kyree Bearden MD;  Location: [facility]     PHACOEMULSIFICATION WITH STANDARD INTRAOCULAR LENS IMPLANT Right 03/10/2021    Procedure: Cataract removal with implant.;  Surgeon: Jamir Mac MD;  Location: [facility]     PHACOEMULSIFICATION WITH STANDARD INTRAOCULAR LENS IMPLANT Left 2021    Procedure: Cataract removal with implant.;  Surgeon: Jamir Mac MD;  Location: [facility]     PICC DOUBLE LUMEN PLACEMENT Right 2021    5FR DL PICC, basilic vein. L-38cm, 1cm out.     PICC DOUBLE LUMEN PLACEMENT Right 2022    Right cephalic, 41 cm, 1 external length     PITUITARY EXCISION       tooth pulled 4/7  Right       Allergies   Allergen Reactions     Penicillins Hives and Swelling     Occurred as small child   Pt tolerated meropenem 22        Social History     Tobacco Use     Smoking status: Never     Smokeless tobacco: Never   Substance Use Topics     Alcohol use: Yes     Comment: rare      Wt Readings from Last 1 Encounters:   22 62.5 kg (137 lb 12.8 oz)         Anesthesia Evaluation   Pt has had prior anesthetic.     No history of anesthetic complications       ROS/MED HX  ENT/Pulmonary:       Neurologic:       Cardiovascular:       METS/Exercise Tolerance:     Hematologic:       Musculoskeletal:       GI/Hepatic:       Renal/Genitourinary:     (+) renal disease,     Endo:     (+) thyroid problem,     Psychiatric/Substance Use:       Infectious Disease:       Malignancy:       Other:            Physical Exam    Airway        Mallampati: III    Neck ROM: full     Respiratory Devices and Support         Dental       (+) chipped and missing      Cardiovascular   cardiovascular exam normal          Pulmonary   pulmonary exam normal                OUTSIDE LABS:  CBC:   Lab Results   Component Value Date    WBC 6.7 12/23/2022    WBC 8.0 12/22/2022    HGB 8.2 (L) 12/23/2022    HGB 9.7 (L) 12/22/2022    HCT 26.1 (L) 12/23/2022    HCT 30.6 (L) 12/22/2022     (L) 12/23/2022     (L) 12/22/2022     BMP:   Lab Results   Component Value Date     12/23/2022     12/22/2022    POTASSIUM 4.3 12/23/2022    POTASSIUM 3.9 12/22/2022    CHLORIDE 103 12/23/2022    CHLORIDE 102 12/22/2022    CO2 23 12/23/2022    CO2 25 12/22/2022    BUN 33.6 (H) 12/23/2022    BUN 30.8 (H) 12/22/2022    CR 1.50 (H) 12/23/2022    CR 1.58 (H) 12/22/2022     (H) 12/23/2022     (H) 12/22/2022     COAGS:   Lab Results   Component Value Date    PTT 36 01/04/2022    INR 1.12 01/04/2022    FIBR 410 01/04/2022     POC:   Lab Results   Component Value Date    BGM 85 05/23/2019     HEPATIC:   Lab Results   Component Value Date    ALBUMIN 3.2 (L) 12/23/2022    PROTTOTAL 5.6 (L) 12/23/2022     (H) 12/23/2022    AST 73 (H) 12/23/2022    ALKPHOS 527 (H) 12/23/2022    BILITOTAL 0.3 12/23/2022     OTHER:   Lab Results   Component Value Date    LACT 1.0 12/22/2022    COTY 8.8 12/23/2022    PHOS 3.1 12/22/2022    MAG 1.8 12/22/2022    LIPASE 10 (L) 09/18/2022    TSH 0.21 (L)  02/04/2022    T4 1.27 11/15/2022    .15 (H) 09/18/2022       Anesthesia Plan    ASA Status:  2      Anesthesia Type: MAC.              Consents    Anesthesia Plan(s) and associated risks, benefits, and realistic alternatives discussed. Questions answered and patient/representative(s) expressed understanding.     - Discussed: Risks, Benefits and Alternatives for the PROCEDURE were discussed     - Discussed with:  Patient      - Extended Intubation/Ventilatory Support Discussed: No.      - Patient is DNR/DNI Status: No    Use of blood products discussed: No .     Postoperative Care    Pain management: Multi-modal analgesia.        Comments:                Maxine Medrano MD

## 2022-12-23 NOTE — PLAN OF CARE
Physical Therapy Discharge Summary    Reason for therapy discharge:    Discharged to home.    Progress towards therapy goal(s). See goals on Care Plan in [facility] electronic health record for goal details.  Patient indep upon evaluation. No further acute PT required.    Therapy recommendation(s):    No further therapy is recommended.

## 2022-12-23 NOTE — PROGRESS NOTES
12/23/22 0949   Appointment Info   Signing Clinician's Name / Credentials (PT) Jimmy Rausch, PT, DPT   Living Environment   People in Home spouse   Current Living Arrangements house   Home Accessibility stairs to enter home;stairs within home   Number of Stairs, Main Entrance 5   Stair Railings, Main Entrance railings safe and in good condition   Number of Stairs, Within Home, Primary greater than 10 stairs   Stair Railings, Within Home, Primary railings safe and in good condition   Transportation Anticipated family or friend will provide;car, drives self   Living Environment Comments Patient uses a walk in shower. Flight of stairs to basement, flight of stairs to upper level (where bathroom is).   Self-Care   Fall history within last six months no   Activity/Exercise/Self-Care Comment Patient is typically indep without AD for mobility, indep with bADL, and indep with IADL including driving. Spouse does majority of the driving but patient drives as well.   General Information   Onset of Illness/Injury or Date of Surgery 12/22/22   Pertinent History of Current Problem (include personal factors and/or comorbidities that impact the POC) presyncope, weakness, fever, elevated LFTs, Metastatic spindle cell sarcoma, new pancreatic masses   Existing Precautions/Restrictions no known precautions/restrictions   Range of Motion (ROM)   Range of Motion ROM is WFL   Strength (Manual Muscle Testing)   Strength (Manual Muscle Testing) strength is WFL   Bed Mobility   Bed Mobility supine-sit   Supine-Sit [place] (Bed Mobility) modified independence   Assistive Device (Bed Mobility)   (HOB elevated)   Transfers   Transfers sit-stand transfer   Sit-Stand Transfer   Sit-Stand [place] (Transfers) independent   Assistive Device (Sit-Stand Transfers)   (none)   Gait/Stairs (Locomotion)   [place] Level (Gait) independent   Assistive Device (Gait)   (none)   Distance in Feet 400   Negotiation (Stairs) stairs  independence;stairs assistive device;handrail location;number of steps   [place] Level (Stairs) modified independence   Handrail Location (Stairs) right side (ascending);left side (descending)   Number of Steps (Stairs) 4 x 4steps   Clinical Impression   Criteria for Skilled Therapeutic Intervention Evaluation only;No problems identified which require skilled intervention   Clinical Presentation (PT Evaluation Complexity) Stable/Uncomplicated   Clinical Presentation Rationale clinical judgement   Clinical Decision Making (Complexity) low complexity   Risk & Benefits of therapy have been explained evaluation/treatment results reviewed;care plan/treatment goals reviewed;participants voiced agreement with care plan;participants included;patient   Clinical Impression Comments Patient reports significant improvement in energy and mobility today compared to two days ago. Patient demonstrating independence with lower body dressing, mobility without AD, stairs with single handrail. Patient cleared to ambulate indep on the unit. No LOB noted with head turns or 180deg turns. Patient able to pathfind with cues and aware of his room number.   PT Total Evaluation Time   PT Eval, Low Complexity Minutes (64822) 15   Physical Therapy Goals   PT Frequency One time eval and treatment only   PT Discharge Planning   PT Plan dc PT - indep   PT Discharge Recommendation (DC Rec) home with assist   PT Rationale for DC Rec patient is indep with mobility, including stairs; indep with lower body dressing   PT Brief overview of current status indep transfers/gait, mod indep bed mobility/stairs   Total Session Time   Total Session Time (sum of timed and untimed services) 15

## 2022-12-23 NOTE — PLAN OF CARE
Problem: Pain Acute  Goal: Optimal Pain Control and Function  Outcome: Progressing  Intervention: Prevent or Manage Pain  Recent Flowsheet Documentation  Taken 12/22/2022 2159 by Andree Magdaleno RN  Medication Review/Management: medications reviewed    Problem: Plan of Care - These are the overarching goals to be used throughout the patient stay.    Goal: Absence of Hospital-Acquired Illness or Injury  Intervention: Prevent Skin Injury  Recent Flowsheet Documentation  Taken 12/22/2022 2159 by Andree Magdaleno RN  Body Position: position changed independently      Goal Outcome Evaluation:  Patient received from [facility] ED. Patient alert and oriented x4. Moving independently in room. Denies pain. Port a cath present on right side. Vitals stable on room air. Regular diet.

## 2022-12-23 NOTE — PROGRESS NOTES
[facility]    Medicine Progress Note - Hospitalist Service    Date of Admission:  12/22/2022    Assessment & Plan      Ifrah Huitron is a 72 year old male with past medical history significant for metastatic spindle cell sarcoma, malignant mesothelioma, mediastinal lymphadenopathy, left vocal fold paralysis, central hypothyroidism, GERD, kidney stones, and DJD who was admitted on 12/22/2022 as a transfer from [facility]. He was found to have fever, weakness, and an episode of presyncope 1 day prior to arrival.  He was seen by his infusion center and noted to have elevated LFTs and told to come to the ED, after which she was transferred here.     Presyncope, weakness  Fever-resolved  Elevated LFTs  -Abnormal MRCP 12/22: Mild biliary ductal dilation, possible gallbladder sludge in the distal common bile duct, gallbladder sludge within the gallbladder, common bile duct 9 mm  -monitor LFTs  -Has angioedema and hives from penicillins, started on meropenem, has possible crossover reactions so hypersensitivity protocol is in place  -Started on meropenem due to severe penicillin allergy  -GI consulted: Plan for ERCP  -NPO + IVF     Metastatic spindle cell sarcoma  -Diagnosed in March 2021, follows outpatient with the [facility] cancer center in [facility]  -His chemotherapy has been on hold for the past month due to associated toxicities  -Mediastinal mass, known lung metastases, splenic mass, liver metastases  -CT 12/22: Several new pulmonary nodules, slightly larger mass involving the spleen, stable anterior mediastinal mass     New pancreatic masses  -MRCP 12/22: A few tiny cystic lesions in the pancreas suggesting small cystic neoplasms  -Recommend 1 year follow-up with MRI for surveillance     Essential hypertension  -Continue PTA amlodipine 5 mg daily     Hypopituitarism  Hypothyroidism  -Continue PTA hydrocortisone, Synthroid     Depression  -Continue PTA  Lexapro     Diet: NPO per Anesthesia Guidelines for Procedure/Surgery Except for: Meds    DVT Prophylaxis: Pneumatic Compression Devices  Bonner Catheter: Not present  Central Lines: PRESENT     Cardiac Monitoring: None  Code Status: Full Code      Disposition Plan      Expected Discharge Date: 12/24/2022    Discharge Delays: Lab Result Pending (enter specific test & time in comments)            The patient's care was discussed with the Bedside Nurse and Patient.    Brook East MD  Hospitalist Service  [facility]  Securely message with the Vocera Web Console (learn more here)  Text page via [employer] Paging/Directory         Clinically Significant Risk Factors Present on Admission              # Hypoalbuminemia: Lowest albumin = 3.2 g/dL at 12/23/2022  5:45 AM, will monitor as appropriate                  ______________________________________________________________________    Interval History   Feeling better, nausea in control with medications, no vomiting, no fever/chill, same pain from cancer    Data reviewed today: I reviewed all medications, new labs and imaging results over the last 24 hours.     Physical Exam   Vital Signs: Temp: 97.6  F (36.4  C) Temp src: Oral BP: (!) 152/86 Pulse: 60   Resp: 15 SpO2: 98 % O2 Device: None (Room air)    Weight: 137 lbs 12.8 oz  General.  Awake alert oriented not in acute distress.  HEENT.  Pupils equal round react to light, anicteric, EOM intact.  Neck supple no JVD.  CVS regular rhythm no murmur gallops.  Lungs.  Clear to auscultation bilateral no wheezing or rales.  Abdomen.  Soft epigastric tender bowel sounds present.  Extremities.  No edema no calf tenderness.  Neurological.  Awake and alert. No focal deficit.  Skin no rash. No pallor.  Psych. Normal mood.       Data   Recent Labs   Lab 12/23/22  0545 12/22/22  1053   WBC 6.7 8.0   HGB 8.2* 9.7*   MCV 97 97   * 143*    137   POTASSIUM 4.3 3.9   CHLORIDE 103 102   CO2 23 25   BUN  33.6* 30.8*   CR 1.50* 1.58*   ANIONGAP 11 10   COTY 8.8 9.3   * 107*   ALBUMIN 3.2* 3.6   PROTTOTAL 5.6* 6.5   BILITOTAL 0.3 0.5   ALKPHOS 527* 762*   * 225*   AST 73* 197*     Recent Results (from the past 24 hour(s))   CT Chest/Abdomen/Pelvis w Contrast    Narrative    CT CHEST/ABDOMEN/PELVIS WITH CONTRAST 12/22/2022 2:13 PM    CLINICAL HISTORY: Near syncopal spell. Fever, weakness, elevated liver  enzymes. 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 Last imaging  11/7/2022. Evaluate for interval change.    TECHNIQUE: CT scan of the chest, abdomen, and pelvis was performed  following injection of IV contrast. Multiplanar reformats were  obtained. Dose reduction techniques were used.   CONTRAST: 68 mL Isovue 370    COMPARISON: CT chest, abdomen and pelvis 11/7/2022.    FINDINGS:   LUNGS AND PLEURA: No effusions. No pneumothorax. New indeterminant 0.5  cm pulmonary nodule medial right lower lobe series 3 image 184. New  subpleural right upper lobe lateral nodule measuring 0.2 cm, image 85.  There are multiple additional examples of new small nodules  bilaterally, some with a groundglass character, for example, image 126  and others appearing small and solid clustered together, image 165.    MEDIASTINUM/AXILLAE: Stable mass at the anterior mediastinum and  medial left upper lobe region that is 3.7 x 2.7 cm series 2 image 62.  Right chest Port-A-Cath. Stable small mediastinal lymph nodes. No  acute mediastinal abnormality. Stable small nodule at the anterior  left pericardial fat measuring 0.7 cm image 85.    CORONARY ARTERY CALCIFICATION: Mild.    HEPATOBILIARY: Several small hypodensities in the liver are stable.  Some are suggestive of cysts, but others are too small for  characterization. No new liver lesion identified. Gallbladder shows no  acute abnormality.    PANCREAS: Normal.    SPLEEN: Lobulated splenic mass is slightly  larger measuring 9.3 x 8.4  cm, previously 8.9 x 7.9 cm image 129. This contacts the posterior  left hemidiaphragm. A portion of this also contacts the upper left  kidney.    ADRENAL GLANDS: Normal.    KIDNEYS/BLADDER: Stable bilateral renal cysts without specific imaging  follow-up recommended. No hydronephrosis or stone. Bladder is  unremarkable.    BOWEL: Colonic diverticula. No acute bowel abnormality. Normal  appendix.    PELVIC ORGANS: Prostate is 4.9 cm.    ADDITIONAL FINDINGS: No new adenopathy identified.    MUSCULOSKELETAL: Stable small sclerotic foci within the bilateral  pelvis, right femoral neck, left femoral head. No new bone lesion  identified.      Impression    IMPRESSION:  1.  Several bilateral new pulmonary nodules identified. These are  indeterminant. New pulmonary metastatic disease is a possibility,  though this could be infectious or inflammatory nodules. As such,  recommend short interval follow-up CT chest in three months.  2.  Slightly larger mass involving the spleen and contacting the  adjacent left posterior hemidiaphragm and left upper kidney.  3.  Stable size of an anterior mediastinal mass.  4.  Small hypodensities in the liver are stable. Many of these appear  to represent cysts, but others are too small for characterization.    AMBER JASON MD         SYSTEM ID:  [id number]   Abdomen MRI w & w/o contrast mrcp    Narrative    EXAM: MR ABDOMEN MRCP W/O and W CONTRAST  LOCATION: [facility]  DATE/TIME: 12/22/2022 5:49 PM    INDICATION: Elevated liver enzymes. Abnormal ultrasound on 11/30/2022. Fever, history of metastatic spindle cell carcinoma. Evaluate for acute hepatobiliary process.    COMPARISON: CT of chest, abdomen and pelvis 12/22/2022. Ultrasound abdomen 11/30/2022.    TECHNIQUE: Routine MR liver/pancreas protocol including axial and coronal MRCP sequences. 2D and 3D reconstruction performed by MR technologist including MIP reconstruction and slab  cholangiograms. If performed with contrast, additional dynamic T1 post   IV contrast images.    CONTRAST: 7 mL Gadavist.    FINDINGS:   MRCP: Gallbladder sludge identified. No convincing acute inflammatory change of the gallbladder wall on the supplied MRI images. There is mild biliary ductal dilatation diffusely. Common bile duct is 9 mm. There is small ill-defined low signal within the   distal common bile duct for example series 3, image 19 and on series 4, image 20. Also see series 6, image 10.     LIVER: There are multiple right and left hepatic simple cysts that are very small in size without worrisome features. No worrisome hepatic lesion identified.    PANCREAS: No solid pancreas lesion. The T2-weighted images show a few tiny cystic pancreas lesions without worrisome features. One of these at the uncinate measures 0.3 cm series 3, image 23. Another at the pancreas tail measures 0.3 cm series 3, image   25. No main pancreatic duct dilatation.    ADDITIONAL FINDINGS: Large lobulated mass occupying the spleen again noted and described on the comparison CT. It measures approximately 9 x 8.1 cm and contacts the posterior left hemidiaphragm, and contacts the upper left kidney. Multiple bilateral   renal cysts appear simple without specific imaging follow-up recommended. No hydronephrosis. Normal adrenals.      Impression    IMPRESSION:  1.  Mild biliary ductal dilatation. Small region of low signal at the distal common bile duct could represent gallbladder sludge extending to this region. There is gallbladder sludge noted within the gallbladder lumen as well. The common bile duct   measures 9 mm.  2.  A few tiny cystic lesions at the pancreas suggesting small cystic neoplasms. One year follow-up MRI is recommended for surveillance. No pancreas duct dilatation.  3.  Lobulated mass at the spleen consistent with malignancy again identified.  4.  Several small simple hepatic cysts without worrisome features.        Medications     lactated ringers       sodium chloride 75 mL/hr at 12/23/22 1015       [Auto Hold] amLODIPine  5 mg Oral Daily     [Auto Hold] escitalopram  10 mg Oral QPM     [Auto Hold] hydrocortisone  10 mg Oral Daily at 4 pm     [Auto Hold] hydrocortisone  20 mg Oral Daily     [Auto Hold] levothyroxine  75 mcg Oral QAM AC     [Auto Hold] meropenem  1 g Intravenous Q12H     sodium chloride (PF)  3 mL Intracatheter Q8H     sodium chloride (PF)  3 mL Intracatheter Q8H     [Auto Hold] sodium chloride (PF)  3 mL Intracatheter Q8H

## 2022-12-23 NOTE — ANESTHESIA PROCEDURE NOTES
Airway       Patient location during procedure: OR       Procedure Start/Stop Times: 12/23/2022 2:08 PM  Staff -        CRNA: Izabella Chilel APRN CRNA       Performed By: CRNA  Consent for Airway        Urgency: elective  Indications and Patient Condition       Indications for airway management: teresa-procedural       Induction type:intravenous       Mask difficulty assessment: 2 - vent by mask + OA or adjuvant +/- NMBA    Final Airway Details       Final airway type: endotracheal airway       Successful airway: ETT - single  Endotracheal Airway Details        ETT size (mm): 7.5       Cuffed: yes       Successful intubation technique: video laryngoscopy       VL Blade Size: Glidescope 4       Grade View of Cords: 1       Adjucts: stylet       Position: Right       Measured from: lips       Secured at (cm): 21    Post intubation assessment        Placement verified by: capnometry, equal breath sounds and chest rise        Number of attempts at approach: 2       Number of other approaches attempted: 1       Secured with: silk tape       Ease of procedure: easy       Dentition: Intact and Unchanged       Dental guard used and removed.    Medication(s) Administered   Medication Administration Time: 12/23/2022 2:08 PM    Additional Comments       Grade 3 view with Koenig 3 - anterior airway and stiff neck. Easy glidescope

## 2022-12-23 NOTE — CONSULTS
Chart assessed.  No CM indicators identified.  Anticipate pt to discharge home with no needs.  Therapy recs for home.  Family to transport.          BRIGIDA Wood, LGSW 12/23/22 3:38 PM

## 2022-12-24 NOTE — PLAN OF CARE
Problem: Plan of Care - These are the overarching goals to be used throughout the patient stay.    Goal: Optimal Comfort and Wellbeing  Outcome: Progressing     Problem: Pain Acute  Goal: Optimal Pain Control and Function  Outcome: Progressing  Intervention: Prevent or Manage Pain  Recent Flowsheet Documentation  Taken 12/24/2022 0927 by Felicia Freeman RN  Sensory Stimulation Regulation: lighting decreased  Medication Review/Management: medications reviewed     Problem: Risk for Delirium  Goal: Improved Attention and Thought Clarity  Outcome: Progressing  Intervention: Maximize Cognitive Function  Recent Flowsheet Documentation  Taken 12/24/2022 0927 by Felicia Freeman RN  Sensory Stimulation Regulation: lighting decreased  Reorientation Measures: clock in view   Goal Outcome Evaluation:       Alert and oriented. Scheduled for Cholecystectomy this afternoon around 430 PM. Patient informed. Has been NPO since midnight. Only sips of water with medications today. IV fluids started for NPO status. Independent in room. Reports having small BM today.

## 2022-12-24 NOTE — PLAN OF CARE
Goal Outcome Evaluation:    Pt denies pain post op ERCP from this afternoon. VSS on RA afebrile, on continuous pulse ox SpO2 saturations in the upper 90's . Full liquid diet to NPO at 0000. A&O x4. Jose cath R anterior chest wall accessed and SL. Pt has not urinated since surgery reports last BM three days ago.     BP (!) 142/85 (BP Location: Right arm, Patient Position: Right side, Cuff Size: Adult Regular)   Pulse 80   Temp 97.9  F (36.6  C) (Oral)   Resp 12   Wt 62.5 kg (137 lb 12.8 oz)   SpO2 99%   BMI 21.58 kg/m          Plan of Care Reviewed With: patient    Overall Patient Progress: improvingOverall Patient Progress: improving  Problem: Plan of Care - These are the overarching goals to be used throughout the patient stay.    Goal: Absence of Hospital-Acquired Illness or Injury  Intervention: Identify and Manage Fall Risk  Recent Flowsheet Documentation  Taken 12/23/2022 1553 by Laura Martin RN  Safety Promotion/Fall Prevention:    nonskid shoes/slippers when out of bed    patient and family education    fall prevention program maintained     Problem: Plan of Care - These are the overarching goals to be used throughout the patient stay.    Goal: Absence of Hospital-Acquired Illness or Injury  Intervention: Prevent Skin Injury  Recent Flowsheet Documentation  Taken 12/23/2022 1553 by Laura Martin, RN  Body Position: position changed independently     Problem: Plan of Care - These are the overarching goals to be used throughout the patient stay.    Goal: Absence of Hospital-Acquired Illness or Injury  Intervention: Prevent and Manage VTE (Venous Thromboembolism) Risk  Recent Flowsheet Documentation  Taken 12/23/2022 1553 by Laura Martin, RN  VTE Prevention/Management: SCDs (sequential compression devices) off     Problem: Plan of Care - These are the overarching goals to be used throughout the patient stay.    Goal: Optimal Comfort and Wellbeing  Outcome: Progressing     Problem: Plan of  Care - These are the overarching goals to be used throughout the patient stay.    Goal: Readiness for Transition of Care  Outcome: Progressing     Problem: Pain Acute  Goal: Optimal Pain Control and Function  Outcome: Progressing     Problem: Pain Acute  Goal: Optimal Pain Control and Function  Intervention: Prevent or Manage Pain  Recent Flowsheet Documentation  Taken 12/23/2022 5142 by Laura Martin RN  Medication Review/Management: medications reviewed     Problem: Risk for Delirium  Goal: Improved Sleep  Outcome: Progressing

## 2022-12-24 NOTE — PROGRESS NOTES
[facility]    Medicine Progress Note - Hospitalist Service    Date of Admission:  12/22/2022    Assessment & Plan   Ifrah Huitron is a 72 year old male with past medical history significant for metastatic spindle cell sarcoma, malignant mesothelioma, mediastinal lymphadenopathy, left vocal fold paralysis, central hypothyroidism, GERD, kidney stones, and DJD who was admitted on 12/22/2022 as a transfer from [facility]. He was found to have fever, weakness, and an episode of presyncope 1 day prior to arrival.  He was seen by his infusion center and noted to have elevated LFTs and told to come to the ED, after which she was transferred here.     Presyncope, weakness  Fever-resolved  Elevated LFTs  Cholelithiasis, ?cholecystitis  -Abnormal MRCP 12/22: Mild biliary ductal dilation, possible gallbladder sludge in the distal common bile duct, gallbladder sludge within the gallbladder, common bile duct 9 mm  -monitor LFTs  -Has angioedema and hives from penicillins, started on meropenem, has possible crossover reactions so hypersensitivity protocol is in place  -Started on meropenem due to severe penicillin allergy  -GI consulted: ERCP done 12/23  -NPO + IVF  -surgical consultation     Metastatic spindle cell sarcoma  -Diagnosed in March 2021, follows outpatient with the [facility] cancer center in [facility]  -His chemotherapy has been on hold for the past month due to associated toxicities  -Mediastinal mass, known lung metastases, splenic mass, liver metastases  -CT 12/22: Several new pulmonary nodules, slightly larger mass involving the spleen, stable anterior mediastinal mass     New pancreatic masses  -MRCP 12/22: A few tiny cystic lesions in the pancreas suggesting small cystic neoplasms  -Recommend 1 year follow-up with MRI for surveillance     Essential hypertension  -Continue PTA amlodipine 5 mg daily     Hypopituitarism  Hypothyroidism  -Continue PTA  hydrocortisone, Synthroid     Depression  -Continue PTA Lexapro       Diet: NPO per Anesthesia Guidelines for Procedure/Surgery Except for: Meds    DVT Prophylaxis: Pneumatic Compression Devices  Bonner Catheter: Not present  Central Lines: PRESENT     Cardiac Monitoring: None  Code Status: Full Code      Disposition Plan     Expected Discharge Date: 12/24/2022    Discharge Delays: Lab Result Pending (enter specific test & time in comments)  Destination: home        barrier: surgical consultation    The patient's care was discussed with the Patient.    Brook East MD  Hospitalist Service  [facility]  Securely message with the Vocera Web Console (learn more here)  Text page via [employer] Paging/Directory         Clinically Significant Risk Factors              # Hypoalbuminemia: Lowest albumin = 3.2 g/dL at 12/23/2022  5:45 AM, will monitor as appropriate                   ______________________________________________________________________    Interval History   abd pain+, nausea but no vomiting; no f/c, no cp/sob    Data reviewed today: I reviewed all medications, new labs and imaging results over the last 24 hours.     Physical Exam   Vital Signs: Temp: 98.3  F (36.8  C) Temp src: Oral BP: (!) 147/72 Pulse: 77   Resp: 16 SpO2: 100 % O2 Device: None (Room air) Oxygen Delivery: 6 LPM  Weight: 137 lbs 12.8 oz  General.  Awake alert oriented not in acute distress.  HEENT.  Pupils equal round react to light, anicteric, EOM intact.  Neck supple no JVD.  CVS regular rhythm no murmur gallops.  Lungs.  Clear to auscultation bilateral no wheezing or rales.  Abdomen.  Soft upper abd +tender bowel sounds present.  Extremities.  No edema no calf tenderness.  Neurological.  Awake and alert. No focal deficit.  Skin no rash. No pallor.  Psych. Normal mood.       Data   Recent Labs   Lab 12/23/22  1822 12/23/22  0545 12/22/22  1053   WBC  --  6.7 8.0   HGB  --  8.2* 9.7*   MCV  --  97 97   PLT  --  130*  143*   NA  --  137 137   POTASSIUM  --  4.3 3.9   CHLORIDE  --  103 102   CO2  --  23 25   BUN  --  33.6* 30.8*   CR  --  1.50* 1.58*   ANIONGAP  --  11 10   COTY  --  8.8 9.3   * 135* 107*   ALBUMIN  --  3.2* 3.6   PROTTOTAL  --  5.6* 6.5   BILITOTAL  --  0.3 0.5   ALKPHOS  --  527* 762*   ALT  --  148* 225*   AST  --  73* 197*     Recent Results (from the past 24 hour(s))   XR Surgery LO Fluoro G/T 5 Min w Stills    Narrative    EXAM: XR SURGERY LO FLUORO GREATER THAN 5 MIN W STILLS  LOCATION: [facility]  DATE/TIME: 12/23/2022 2:54 PM    INDICATION: intra op  COMPARISON: MRCP 12/22/2022  TECHNIQUE: Exam performed by gastroenterologist.    FLUOROSCOPIC TIME: 2.8 minutes  NUMBER OF IMAGES: 7    FINDINGS:    BILE DUCTS: Diffusely dilated up to 1.5 cm. A rounded filling defect in the distal common bile duct.  PANCREATIC DUCT: Not imaged.      Impression    IMPRESSION:  Biliary dilation with filling defect in the distal common bile duct, which was reportedly removed. Refer to ERCP report for full details.     Medications     sodium chloride 75 mL/hr at 12/24/22 0920       amLODIPine  5 mg Oral Daily     amLODIPine  5 mg Oral Daily     celecoxib  200 mg Oral BID     escitalopram  10 mg Oral Daily     escitalopram  10 mg Oral QPM     hydrocortisone  10 mg Oral Daily at 4 pm     hydrocortisone  20 mg Oral Daily     levothyroxine  75 mcg Oral QAM AC     meropenem  1 g Intravenous Q12H     sodium chloride (PF)  3 mL Intracatheter Q8H

## 2022-12-24 NOTE — PLAN OF CARE
"Goal Outcome Evaluation: patient is A&Ox4. C/o lower back pain, managed w/tylenol, denies n/v, lightheadenes. Afebrile, BP slightly elevated. On RA, LSC. NPO at MN. Jose cath patent. Able to make needs known.            Problem: Plan of Care - These are the overarching goals to be used throughout the patient stay.    Goal: Plan of Care Review  Description: The Plan of Care Review/Shift note should be completed every shift.  The Outcome Evaluation is a brief statement about your assessment that the patient is improving, declining, or no change.  This information will be displayed automatically on your shift note.  12/24/2022 0311 by Court Bhat RN  Outcome: Progressing  12/24/2022 0310 by Court Bhat RN  Outcome: Progressing  Goal: Patient-Specific Goal (Individualized)  Description: You can add care plan individualizations to a care plan. Examples of Individualization might be:  \"Parent requests to be called daily at 9am for status\", \"I have a hard time hearing out of my right ear\", or \"Do not touch me to wake me up as it startles me\".  12/24/2022 0311 by Court Bhat RN  Outcome: Progressing  12/24/2022 0310 by Court Bhat RN  Outcome: Progressing  Goal: Absence of Hospital-Acquired Illness or Injury  12/24/2022 0311 by Court Bhat RN  Outcome: Progressing  12/24/2022 0310 by Court Bhat RN  Outcome: Progressing  Intervention: Identify and Manage Fall Risk  Recent Flowsheet Documentation  Taken 12/24/2022 0015 by Court Bhat RN  Safety Promotion/Fall Prevention:    nonskid shoes/slippers when out of bed    patient and family education    fall prevention program maintained  Taken 12/23/2022 2031 by Court Bhat RN  Safety Promotion/Fall Prevention:    nonskid shoes/slippers when out of bed    patient and family education    fall prevention program maintained  Intervention: Prevent Skin Injury  Recent Flowsheet Documentation  Taken 12/24/2022 0015 by Court Bhat" RN  Body Position: position changed independently  Taken 12/23/2022 2031 by Court Bhat RN  Body Position: position changed independently  Intervention: Prevent and Manage VTE (Venous Thromboembolism) Risk  Recent Flowsheet Documentation  Taken 12/24/2022 0015 by Court Bhat RN  VTE Prevention/Management: SCDs (sequential compression devices) off  Taken 12/23/2022 2031 by Court Bhat RN  VTE Prevention/Management: SCDs (sequential compression devices) off  Goal: Optimal Comfort and Wellbeing  12/24/2022 0311 by Court Bhat RN  Outcome: Progressing  12/24/2022 0310 by Court Bhat RN  Outcome: Progressing  Goal: Readiness for Transition of Care  12/24/2022 0311 by Court Bhat RN  Outcome: Progressing  12/24/2022 0310 by Court Bhat RN  Outcome: Progressing     Problem: Pain Acute  Goal: Optimal Pain Control and Function  12/24/2022 0311 by Court Bhat RN  Outcome: Progressing  12/24/2022 0310 by Court Bhat RN  Outcome: Progressing  Intervention: Prevent or Manage Pain  Recent Flowsheet Documentation  Taken 12/24/2022 0015 by Court Bhat RN  Sensory Stimulation Regulation: lighting decreased  Medication Review/Management: medications reviewed  Taken 12/23/2022 2031 by Court Bhat RN  Sensory Stimulation Regulation: lighting decreased  Medication Review/Management: medications reviewed     Problem: Risk for Delirium  Goal: Optimal Coping  12/24/2022 0311 by Court Bhat RN  Outcome: Progressing  12/24/2022 0310 by Court Bhat RN  Outcome: Progressing  Goal: Improved Behavioral Control  12/24/2022 0311 by Court Bhat RN  Outcome: Progressing  12/24/2022 0310 by Court Bhat RN  Outcome: Progressing  Intervention: Minimize Safety Risk  Recent Flowsheet Documentation  Taken 12/24/2022 0015 by Court Bhat RN  Enhanced Safety Measures: bed alarm set  Taken 12/23/2022 2031 by Court Bhat RN  Enhanced Safety Measures: bed alarm  set  Goal: Improved Attention and Thought Clarity  12/24/2022 0311 by Court Bhat, RN  Outcome: Progressing  12/24/2022 0310 by Court Bhat RN  Outcome: Progressing  Intervention: Maximize Cognitive Function  Recent Flowsheet Documentation  Taken 12/24/2022 0015 by Court Bhat, PETER  Sensory Stimulation Regulation: lighting decreased  Reorientation Measures: clock in view  Taken 12/23/2022 2031 by Court Bhat RN  Sensory Stimulation Regulation: lighting decreased  Reorientation Measures: clock in view  Goal: Improved Sleep  12/24/2022 0311 by Court Bhat, RN  Outcome: Progressing  12/24/2022 0310 by Court Bhat, RN  Outcome: Progressing

## 2022-12-24 NOTE — ANESTHESIA PROCEDURE NOTES
Airway       Patient location during procedure: OR       Procedure Start/Stop Times: 12/24/2022 5:30 PM  Staff -        CRNA: Veronique Davila APRN CRNA       Performed By: CRNA  Consent for Airway        Urgency: elective  Indications and Patient Condition       Indications for airway management: teresa-procedural       Induction type:intravenous       Mask difficulty assessment: 1 - vent by mask    Final Airway Details       Final airway type: endotracheal airway       Successful airway: ETT - single  Endotracheal Airway Details        ETT size (mm): 7.5       Cuffed: yes       Successful intubation technique: video laryngoscopy       VL Blade Size: Glidescope 3       Grade View of Cords: 1       Adjucts: stylet       Position: Right       Measured from: gums/teeth       Secured at (cm): 22       Bite block used: None    Post intubation assessment        Placement verified by: capnometry, equal breath sounds and chest rise        Number of attempts at approach: 1       Number of other approaches attempted: 0       Secured with: silk tape       Ease of procedure: easy       Dentition: Intact       Dental guard used and removed. Dental Guard Type: Proguard Red.    Medication(s) Administered   Medication Administration Time: 12/24/2022 5:30 PM

## 2022-12-24 NOTE — PROGRESS NOTES
GI PROGRESS NOTE  12/24/2022  Ifrah Huitron  1950  /-31    Subjective:   Abdominal pain still present on the left side, thinks this may be slightly improved since yesterday but unsure. Denies any nausea, vomiting, fever, or chills.     Passing stools with no signs of melena or hematochezia. Also passing flatulence.      Objective:     Blood pressure (!) 150/76, pulse 70, temperature 98.2  F (36.8  C), temperature source Oral, resp. rate 18, weight 62.5 kg (137 lb 12.8 oz), SpO2 99 %.    Body mass index is 21.58 kg/m .  Gen: NAD  Cardio: RRR  GI: Non-distended, BS positive, soft, slight tenderness to palpation of the left abdomen.     Laboratory:    BMP  Recent Labs   Lab 12/23/22  1822 12/23/22  0545 12/22/22  1053   NA  --  137 137   POTASSIUM  --  4.3 3.9   CHLORIDE  --  103 102   COTY  --  8.8 9.3   CO2  --  23 25   BUN  --  33.6* 30.8*   CR  --  1.50* 1.58*   * 135* 107*     CBC  Recent Labs   Lab 12/23/22  0545 12/22/22  1053   WBC 6.7 8.0   RBC 2.70* 3.16*   HGB 8.2* 9.7*   HCT 26.1* 30.6*   MCV 97 97   MCH 30.4 30.7   MCHC 31.4* 31.7   RDW 18.0* 18.4*   * 143*     INRNo lab results found in last 7 days.   LFTs  Recent Labs   Lab Test 12/23/22  0545 12/22/22  1126 12/22/22  1053 12/14/22  0926 11/26/22  0555 11/25/22  1714 09/22/22  0956 09/18/22  1716 09/18/22  1508 06/21/21  1410 03/29/21  1022   ALBUMIN 3.2*  --  3.6 3.7   < >  --    < >  --  3.0*   < > 3.3*   BILITOTAL 0.3  --  0.5 0.5   < >  --    < >  --  1.4*   < > 0.4   *  --  225* 113*   < >  --    < >  --  30   < > 19   AST 73*  --  197* 50   < >  --    < >  --  24   < > 17   PROTEIN  --  30*  --   --   --  50*  --  Trace*  --    < >  --    LIPASE  --   --   --   --   --   --   --   --  10*  --  79    < > = values in this interval not displayed.     Assessment:   1. Abnormal MRCP  2. Fever  3. Abdominal pain  4. Elevated LFTs  72 year old male with history of metastatic spindle cell sarcoma, malignant mesothelioma,  mediastinal lymphadenopathy, left vocal fold paralysis, central hypothyroidism, GERD, kidney stones, and DJD who was admitted on 12/22/2022 for fever, weakness, and elevated LFTs.   -MRCP 12/22/22- Mild biliary ductal dilatation. Small region of low signal at the distal common bile duct could represent gallbladder sludge extending to this region. There is gallbladder sludge noted within the gallbladder lumen as well. The common bile duct measures 9 mm.  Incidental findings of a few tiny cystic lesions at the pancreas suggesting small cystic neoplasms.   -ERCP 12/23/22 Choledocholithiasis with an obstruction was found.                          Complete removal was accomplished by biliary                          sphincterotomy, dilation and balloon extraction.   -Now afebrile. Hemodynamically stable at this time.      Plan:   1. NPO today for surgery consult  2. Surgery consult   3. Continue to trend LFTs  4. Supportive cares per primary   5. GI will sign off, please call with questions and concerns.     Discussed with Dr. Kerri Swartz, CNP  Thank you for the opportunity to participate in the care of this patient.   Please feel free to call me with any questions or concerns.  Phone number [phone].

## 2022-12-24 NOTE — ANESTHESIA PREPROCEDURE EVALUATION
Anesthesia Pre-Procedure Evaluation    Patient: Ifrah Huitron   MRN: [id number] : 1950        Procedure : Procedure(s):  CHOLECYSTECTOMY, LAPAROSCOPIC          Past Medical History:   Diagnosis Date     Arthritis      Mesothelioma, malignant (H) 2021     Spindle cell sarcoma (H) 2021     Thyroid disease     removed pituitary gland      Past Surgical History:   Procedure Laterality Date     COLONOSCOPY N/A 2020    Procedure: COLONOSCOPY;  Surgeon: Ken Camacho MD;  Location: [facility]     ENT SURGERY       HERNIA REPAIR       INSERT PORT VASCULAR ACCESS Right 2022    Procedure: INSERTION, VASCULAR ACCESS PORT;  Surgeon: Daniel Kinney MD;  Location: [facility]     IR CHEST PORT PLACEMENT > 5 YRS OF AGE  2022     LARYNGOSCOPY, EXCISE VOCAL CORD LESION MICROSCOPIC, COMBINED Left 2021    Procedure: MICROLARYNGOSCOPY, LEFT TRUE VOCAL CORD INJECTION WITH PROLARYN;  Surgeon: Kyree Bearden MD;  Location: [facility] OR     PHACOEMULSIFICATION WITH STANDARD INTRAOCULAR LENS IMPLANT Right 03/10/2021    Procedure: Cataract removal with implant.;  Surgeon: Jamir Mac MD;  Location: [facility] OR     PHACOEMULSIFICATION WITH STANDARD INTRAOCULAR LENS IMPLANT Left 2021    Procedure: Cataract removal with implant.;  Surgeon: Jamir Mac MD;  Location: [facility] OR     PICC DOUBLE LUMEN PLACEMENT Right 2021    5FR DL PICC, basilic vein. L-38cm, 1cm out.     PICC DOUBLE LUMEN PLACEMENT Right 2022    Right cephalic, 41 cm, 1 external length     PITUITARY EXCISION       tooth pulled 4/7  Right       Allergies   Allergen Reactions     Penicillins Hives and Swelling     Occurred as small child   Pt tolerated meropenem 22        Social History     Tobacco Use     Smoking status: Never     Smokeless tobacco: Never   Substance Use Topics     Alcohol use: Yes     Comment: rare      Wt Readings from Last 1 Encounters:   22 62.5 kg (137 lb 12.8 oz)        Anesthesia  Evaluation            ROS/MED HX  ENT/Pulmonary: Comment: Mesothelioma   (-) sleep apnea   Neurologic:  - neg neurologic ROS  (-) no seizures and no CVA   Cardiovascular: Comment: 5/2022 TTE  Left ventricular size, wall motion and function are normal. The ejection  fraction is 60-65%.  Normal right ventricle size and systolic function.  IVC diameter <2.1 cm collapsing >50% with sniff suggests a normal RA pressure  of 3 mmHg.  No hemodynamically significant valvular abnormalities on 2D or color flow  imaging. - neg cardiovascular ROS     METS/Exercise Tolerance:     Hematologic:     (+) anemia,     Musculoskeletal:  - neg musculoskeletal ROS     GI/Hepatic:  - neg GI/hepatic ROS   (+) cholecystitis/cholelithiasis,     Renal/Genitourinary:     (+) renal disease, type: CRI,     Endo:     (+) thyroid problem,     Psychiatric/Substance Use:  - neg psychiatric ROS     Infectious Disease:  - neg infectious disease ROS     Malignancy:   (+) Malignancy,     Other:            Physical Exam    Airway  airway exam normal      Mallampati: II   TM distance: > 3 FB   Neck ROM: full   Mouth opening: > 3 cm    Respiratory Devices and Support         Dental  no notable dental history         Cardiovascular   cardiovascular exam normal       Rhythm and rate: regular and normal     Pulmonary   pulmonary exam normal        breath sounds clear to auscultation           OUTSIDE LABS:  CBC:   Lab Results   Component Value Date    WBC 11.7 (H) 12/24/2022    WBC 6.7 12/23/2022    HGB 8.7 (L) 12/24/2022    HGB 8.2 (L) 12/23/2022    HCT 27.1 (L) 12/24/2022    HCT 26.1 (L) 12/23/2022     12/24/2022     (L) 12/23/2022     BMP:   Lab Results   Component Value Date     12/23/2022     12/22/2022    POTASSIUM 4.3 12/23/2022    POTASSIUM 3.9 12/22/2022    CHLORIDE 103 12/23/2022    CHLORIDE 102 12/22/2022    CO2 23 12/23/2022    CO2 25 12/22/2022    BUN 33.6 (H) 12/23/2022    BUN 30.8 (H) 12/22/2022    CR 1.50 (H) 12/23/2022     CR 1.58 (H) 12/22/2022     (H) 12/23/2022     (H) 12/23/2022     COAGS:   Lab Results   Component Value Date    PTT 36 01/04/2022    INR 1.12 01/04/2022    FIBR 410 01/04/2022     POC:   Lab Results   Component Value Date    BGM 85 05/23/2019     HEPATIC:   Lab Results   Component Value Date    ALBUMIN 3.4 (L) 12/24/2022    PROTTOTAL 5.8 (L) 12/24/2022     (H) 12/24/2022    AST 39 12/24/2022    ALKPHOS 456 (H) 12/24/2022    BILITOTAL 0.3 12/24/2022     OTHER:   Lab Results   Component Value Date    LACT 1.0 12/22/2022    COTY 8.8 12/23/2022    PHOS 3.1 12/22/2022    MAG 1.8 12/22/2022    LIPASE 10 (L) 09/18/2022    TSH 0.21 (L) 02/04/2022    T4 1.27 11/15/2022    .15 (H) 09/18/2022       Anesthesia Plan    ASA Status:  3      Anesthesia Type: General.     - Airway: ETT         Techniques and Equipment:     - Airway: Video-Laryngoscope         Consents    Anesthesia Plan(s) and associated risks, benefits, and realistic alternatives discussed. Questions answered and patient/representative(s) expressed understanding.    - Discussed:     - Discussed with:  Patient         Postoperative Care    Pain management: Multi-modal analgesia.   PONV prophylaxis: Ondansetron (or other 5HT-3), Dexamethasone or Solumedrol     Comments:                Mika Torres MD

## 2022-12-24 NOTE — CONSULTS
HPI  72 year old year old male who I have been consulted by Gibran for evaluation of No chief complaint on file.  72-year-old male admitted for choledocholithiasis status post ERCP with stone extraction.  He continues to do well today with minimal abdominal discomfort.  He has had a history of biliary colic in the past but denies any acholic stool or dark-colored urine.  Currently he feels well but is very keen on having his gallbladder removed as the pain that brought him in was significant and he does not want to continue to have biliary symptoms.        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     Thyroid disease     removed pituitary gland       Past Surgical History:   Procedure Laterality Date     COLONOSCOPY N/A 12/17/2020    Procedure: COLONOSCOPY;  Surgeon: Ken Camacho MD;  Location: [facility]     ENT SURGERY       HERNIA REPAIR       INSERT PORT VASCULAR ACCESS Right 1/28/2022    Procedure: INSERTION, VASCULAR ACCESS PORT;  Surgeon: Daniel Kinney MD;  [place] [facility] [place]     IR CHEST PORT PLACEMENT > 5 YRS OF AGE  1/28/2022     LARYNGOSCOPY, EXCISE VOCAL CORD LESION MICROSCOPIC, COMBINED Left 07/01/2021    Procedure: MICROLARYNGOSCOPY, LEFT TRUE VOCAL CORD INJECTION WITH PROLARYN;  Surgeon: Kyree Bearden MD;  Location: [facility]     PHACOEMULSIFICATION WITH STANDARD INTRAOCULAR LENS IMPLANT Right 03/10/2021    Procedure: Cataract removal with implant.;  Surgeon: Jamir Mac MD;  Location: [facility]     PHACOEMULSIFICATION WITH STANDARD INTRAOCULAR LENS IMPLANT Left 04/05/2021    Procedure: Cataract removal with implant.;  Surgeon: Jamir Mac MD;  Location: [facility]     PICC DOUBLE LUMEN PLACEMENT Right 12/01/2021    5FR DL PICC, basilic vein. L-38cm, 1cm out.     PICC DOUBLE LUMEN PLACEMENT Right 01/04/2022    Right cephalic, 41 cm, 1 external length     PITUITARY  EXCISION       tooth pulled 4/7  Right          CURRENT MEDS:    Current Facility-Administered Medications:      0.9% sodium chloride BOLUS, 500 mL, Intravenous, Once PRN, Deidre Melton MD     acetaminophen (TYLENOL) tablet 650 mg, 650 mg, Oral, Q6H PRN, 650 mg at 12/24/22 0600 **OR** acetaminophen (TYLENOL) Suppository 650 mg, 650 mg, Rectal, Q6H PRN, Deidre Melton MD     albuterol (PROVENTIL HFA/VENTOLIN HFA) inhaler, 1-2 puff, Inhalation, Once PRN, Deidre Melton MD     albuterol (PROVENTIL) neb solution 2.5 mg, 2.5 mg, Nebulization, Once PRN, Deidre Melton MD     amLODIPine (NORVASC) tablet 5 mg, 5 mg, Oral, Daily, Deidre Melton MD, 5 mg at 12/24/22 0920     celecoxib (celeBREX) capsule 200 mg, 200 mg, Oral, BID, Brook East MD, 200 mg at 12/24/22 1218     diphenhydrAMINE (BENADRYL) injection 50 mg, 50 mg, Intravenous, Once PRN, Deidre Melton MD     EPINEPHrine (ADRENALIN) kit 0.3 mg, 0.3 mg, Intramuscular, Q5 Min PRN, Deidre Melton MD     escitalopram (LEXAPRO) tablet 10 mg, 10 mg, Oral, QPM, Deidre Melton MD, 10 mg at 12/23/22 2135     famotidine (PEPCID) injection 20 mg, 20 mg, Intravenous, Once PRN, Deidre Melton MD     guaiFENesin-codeine (ROBITUSSIN AC) 100-10 MG/5ML solution 10 mL, 10 mL, Oral, Q4H PRN, Brook East MD     hydrocortisone (CORTEF) tablet 10 mg, 10 mg, Oral, Daily at 4 pm, Deidre Melton MD, 10 mg at 12/23/22 1619     hydrocortisone (CORTEF) tablet 20 mg, 20 mg, Oral, Daily, Deidre Melton MD, 20 mg at 12/24/22 0919     levothyroxine (SYNTHROID/LEVOTHROID) tablet 75 mcg, 75 mcg, Oral, QAM AC, Deidre Melton MD, 75 mcg at 12/24/22 0650     lidocaine (LMX4) cream, , Topical, Q1H PRN, Deidre Melton MD     lidocaine 1 % 0.1-1 mL, 0.1-1 mL, Other, Q1H PRN, Deidre Melton MD     melatonin tablet 1 mg, 1 mg, Oral, At Bedtime PRN, Deidre Melton MD     meperidine (DEMEROL) injection 25 mg, 25 mg, Intravenous,  Q30 Min PRN, Deidre Melton MD     meropenem (MERREM) 1 g vial to attach to  mL bag, 1 g, Intravenous, Q12H, Deidre Melton MD, 1 g at 12/24/22 1211     methylPREDNISolone sodium succinate (solu-MEDROL) injection 125 mg, 125 mg, Intravenous, Once PRN, Deidre Melton MD     naloxone (NARCAN) injection 0.2 mg, 0.2 mg, Intravenous, Q2 Min PRN **OR** naloxone (NARCAN) injection 0.4 mg, 0.4 mg, Intravenous, Q2 Min PRN **OR** naloxone (NARCAN) injection 0.2 mg, 0.2 mg, Intramuscular, Q2 Min PRN **OR** naloxone (NARCAN) injection 0.4 mg, 0.4 mg, Intramuscular, Q2 Min PRN, Deidre Melton MD     ondansetron (ZOFRAN ODT) ODT tab 4 mg, 4 mg, Oral, Q6H PRN **OR** ondansetron (ZOFRAN) injection 4 mg, 4 mg, Intravenous, Q6H PRN, Jim Lamar MD     ondansetron (ZOFRAN ODT) ODT tab 4 mg, 4 mg, Oral, Q6H PRN **OR** ondansetron (ZOFRAN) injection 4 mg, 4 mg, Intravenous, Q6H PRN, Deidre Melton MD     prochlorperazine (COMPAZINE) injection 5 mg, 5 mg, Intravenous, Q6H PRN **OR** prochlorperazine (COMPAZINE) tablet 5 mg, 5 mg, Oral, Q6H PRN **OR** prochlorperazine (COMPAZINE) suppository 12.5 mg, 12.5 mg, Rectal, Q12H PRN, Deidre Melton MD     senna-docusate (SENOKOT-S/PERICOLACE) 8.6-50 MG per tablet 1 tablet, 1 tablet, Oral, BID PRN **OR** senna-docusate (SENOKOT-S/PERICOLACE) 8.6-50 MG per tablet 2 tablet, 2 tablet, Oral, BID PRN, Deidre Melton MD     sodium chloride (PF) 0.9% PF flush 3 mL, 3 mL, Intracatheter, Q8H, Deidre Melton MD, 3 mL at 12/24/22 0631     sodium chloride (PF) 0.9% PF flush 3 mL, 3 mL, Intracatheter, q1 min prn, Deidre Melton MD     sodium chloride 0.9% infusion, , Intravenous, Continuous, Brook East MD, Last Rate: 75 mL/hr at 12/24/22 0920, New Bag at 12/24/22 0920    Critical access hospitalX-reviewed and noncontributory     reports that he has never smoked. He has never used smokeless tobacco. He reports current alcohol use. He reports that he does not use  drugs.    Review of Systems:  The 12 point review of systems  is within normal limits except for as mentioned above in the HPI.  General ROS: No complaints or constitutional symptoms  Ophthalmic ROS: No complaints of visual changes  Skin: No complaints or symptoms   Endocrine: No complaints or symptoms  Hematologic/Lymphatic: No symptoms or complaints  Psychiatric: No symptoms or complaints  Respiratory ROS: no cough, shortness of breath, or wheezing  Cardiovascular ROS: no chest pain or dyspnea on exertion  Gastrointestinal ROS: As per HPI  Genito-Urinary ROS: no dysuria, trouble voiding, or hematuria  Musculoskeletal ROS: no joint or muscle pain  Neurological ROS: no TIA or stroke symptoms      EXAM:  BP (!) 150/76 (BP Location: Right arm)   Pulse 70   Temp 98.2  F (36.8  C) (Oral)   Resp 18   Wt 62.5 kg (137 lb 12.8 oz)   SpO2 99%   BMI 21.58 kg/m    GENERAL: Well developed male, No acute distress, pleasant and conversant   EYES: Pupils equal, round and reactive, no scleral icterus  ABDOMEN: Soft, nontender, nondistended, no Paez sign  SKIN: Pink, warm and dry, no obvious rashes or lesions   NEURO:No focal deficits, ambulatory  MUSCULOSKELETAL:No obvious deformities, no swelling, normal appearing      LABS:  Lab Results   Component Value Date    WBC 11.7 12/24/2022    WBC 11.0 06/21/2021    HGB 8.7 12/24/2022    HGB 14.4 06/21/2021    HCT 27.1 12/24/2022    HCT 44.7 06/21/2021    MCV 95 12/24/2022    MCV 87 06/21/2021     12/24/2022     06/21/2021     INR/Prothrombin Time  Recent Labs   Lab 12/23/22  0545      CO2 23   BUN 33.6*     Lab Results   Component Value Date     (H) 12/24/2022    AST 39 12/24/2022    ALKPHOS 456 (H) 12/24/2022       IMAGES:   Relevant images were reviewed and discussed with the patient.  Notable findings were: ERCP results noted    Assessment/Plan:   Ifrah Huitron is a 72 year old male with cholelithiasis and recent choledocholithiasis.  We discussed the  options including nonsurgical management versus surgical management of his cholelithiasis.  At this point he is not interested in observation and would like to pursue the surgical option.  Risk and benefits of surgery were explained and he has consented to proceed.  Plan be for laparoscopic cholecystectomy today.      Thuan More D.O. [facility]  [phone]

## 2022-12-25 NOTE — OP NOTE
Name:  Ifrah Huitron  PCP:  Sukhdev Kohler Ip[place]  Procedure Date:  12/22/2022 - 12/24/2022      Procedure(s):  CHOLECYSTECTOMY, LAPAROSCOPIC    Pre-Procedure Diagnosis:  Elevated LFTs [R79.89]     Post-Procedure Diagnosis:    Cholecystitis    Surgeon(s):  Froy More DO    Circulator: Demetrice Egan RN  Scrub Person: Lianne Walker JAI    Anesthesia Type:  GET      Findings:  Cholecystitis    Operative Report:    The patient was taken to the operating room and placed in a supine position.  Endotracheal intubation was performed.  SCDs were placed on bilateral lower extremities.  Antibiotics were given prior to skin incision.  The abdomen was prepped and draped in a sterile fashion.    I began the first portion of the procedure by injecting quarter percent Marcaine with epinephrine into the supraumbilical position.  I then made a 5 mm transverse incision above the umbilicus and established a pneumoperitoneum using a Veress needle technique.  Once the pneumoperitoneum was established,I placed a 5 mm trocar with a 5 mm 30  camera into the abdomen.  The surrounding structures were scrutinized for any injuries upon entry.  I did not find any. I placed an 11 mm trocar in the subxiphoid position as well as 2 right upper quadrant 5 mm trochars under direct visualization.  All trochars were placed after local anesthetic was injected to the fascial layers.      I then had my assistant retract the gallbladder cephalad and I persisted to dissect out the cystic duct and cystic artery in the standard fashion using blunt dissection and Bovie electrocautery.  Once I obtained a critical view I then placed several 10 mm titanium clips on the cystic duct 3 distally and one at the gallbladder and infundibulum junction.  I then placed 2 clips across the cystic artery.  Next I transected both the cystic duct and cystic artery with sharp dissection and removed the gallbladder off the gallbladder fossa using Bovie  electrocautery.  I then placed the gallbladder into an Endo Catch bag and brought it out through the subxiphoid port site incision.  Next I achieved hemostasis using Bovie electrocautery, surgicell powder and scrutinized the surgical area for any ongoing bleeding.  None was found.  I then closed the 11 mm subxiphoid port site incision using a Dante needle and an 0 Vicryl suture.  Once this was complete I removed all trochars and desufflated the abdomen.  I then injected local anesthetic and all fascial layers and reapproximated the skin edges of all 4 trocar sites with 4-0 Monocryl subcuticular sutures.  The wounds were then cleaned and covered with dry sterile dressings.  All sponge counts and needle counts were correct at the end of the procedure.    Estimated Blood Loss:   5cc    Specimens:    ID Type Source Tests Collected by Time Destination   1 : Gallbladder  Tissue Gallbladder SURGICAL PATHOLOGY EXAM Froy More DO 12/24/2022  5:50 PM           Drains:        Complications:    None    Froy More DO

## 2022-12-25 NOTE — PLAN OF CARE
Problem: Plan of Care - These are the overarching goals to be used throughout the patient stay.    Goal: Absence of Hospital-Acquired Illness or Injury  Intervention: Identify and Manage Fall Risk  Recent Flowsheet Documentation  Taken 12/25/2022 0926 by Felicia Freeman RN  Safety Promotion/Fall Prevention:   nonskid shoes/slippers when out of bed   patient and family education   clutter free environment maintained   fall prevention program maintained  Intervention: Prevent Skin Injury  Recent Flowsheet Documentation  Taken 12/25/2022 0926 by Felicia Freeman RN  Body Position: position changed independently  Goal: Optimal Comfort and Wellbeing  Intervention: Monitor Pain and Promote Comfort  Recent Flowsheet Documentation  Taken 12/25/2022 0926 by Felicia Freeman RN  Pain Management Interventions: medication (see MAR)     Problem: Pain Acute  Goal: Optimal Pain Control and Function  Intervention: Develop Pain Management Plan  Recent Flowsheet Documentation  Taken 12/25/2022 0926 by Felicia Freeman RN  Pain Management Interventions: medication (see MAR)  Intervention: Prevent or Manage Pain  Recent Flowsheet Documentation  Taken 12/25/2022 0926 by Felicia Freeman RN  Medication Review/Management: medications reviewed     Problem: Risk for Delirium  Goal: Improved Attention and Thought Clarity  Intervention: Maximize Cognitive Function  Recent Flowsheet Documentation  Taken 12/25/2022 0926 by Felicia Freeman RN  Reorientation Measures: clock in view   Goal Outcome Evaluation:       Patient still having some pain on right side of abdomen Ice helps and PRN pain medications. Has been up to bathroom independently eating well and drinking well. 4 lap sites with dried drainage. Port a cath de accessed after IV antibiotic band aid applied. Patient discharging to home.  Will discuss discharge with patient and wife.

## 2022-12-25 NOTE — DISCHARGE INSTRUCTIONS
From your Surgeon.  I      Follow up- For laparoscopic cholecystectomies(gallbladder surgery) our nurses will reach out to you in approximately 3 business days to see how you are doing post-operatively.  If you are wanting to be seen in clinic or you have questions/concerns please reach out and let us know.  Most post op appointments are scheduled in the [facility] which is run by our Physician Assistants or Nurse Practitioner.  During office hours our nurses will triage and speak to you about concerns. If you call after hours you will reach our answering service who will page the doctor or [facility] on call.     Contact  -[facility]                   [address] [id number]                   Phone- [phone]                   Fax- [phone]                       Diet: Regular diet. Patients can have difficulty with constipation following surgery,due in part to the administration of narcotic medications.  If you are suffering with constipation, you should avoid foods such as hard cheeses or red meat.  Things to help avoid constipation are eating foods high in fiber,drink plenty of fluids, and be active as much as you can. If needed you can take over the counter medications for constipation such as laxatives or bulking psyllium products.     Activity: You should continue to be active at home, including ambulating frequently.  If possible try to limit the amount of time spent in bed.    Restrictions: You should not lift greater than 15 pounds for 2 weeks, and will want toavoid strenuous physical activity for 1-2 weeks.  You should limit your physical activity if it causes you discomfort; however, this should resolve within 1-2 weeks.   Walking does not count as strenuous physical activity.You are safe to walk up and down stairs.  Following 2 weeks you may resume all normal physical activity.    Wound / drain care: You  may remove your Band-aids after a period of 24 hours.  The small white strips on the incisions act like artificial scabs, and will begin to peel at the edges at around 5-7 days.  These can then be removed.     It is normal to have a small rim of red present around the incisions. This should not, however,extend beyond 1/4 inch from the incision.  If your incisions become increasingly tender, red, or draining, please contact us.       Bathing: You may shower after 24 hours from surgery.  It is ok to get your incisionswet, but avoid rubbing them.  Avoid soaking in bath tubs, or swimming in lakes, pools, or streams for 2 weeks following surgery.

## 2022-12-25 NOTE — PLAN OF CARE
Problem: Plan of Care - These are the overarching goals to be used throughout the patient stay.    Goal: Optimal Comfort and Wellbeing  12/24/2022 1946 by Felicia Freeman RN  Outcome: Progressing  12/24/2022 1424 by Felicia Freeman RN  Outcome: Progressing     Problem: Pain Acute  Goal: Optimal Pain Control and Function  12/24/2022 1619 by Felicia Freeman RN  Outcome: Progressing  12/24/2022 1424 by Felicia Freeman RN  Outcome: Progressing  Intervention: Prevent or Manage Pain  Recent Flowsheet Documentation  Taken 12/24/2022 0927 by Felicia Freeman RN  Sensory Stimulation Regulation: lighting decreased  Medication Review/Management: medications reviewed     Problem: Risk for Delirium  Goal: Improved Attention and Thought Clarity  12/24/2022 1619 by Felicia Freeman RN  Outcome: Progressing  12/24/2022 1424 by Felicia Freeman RN  Outcome: Progressing  Intervention: Maximize Cognitive Function  Recent Flowsheet Documentation  Taken 12/24/2022 1531 by Felicia Freeman RN  Reorientation Measures: clock in view  Taken 12/24/2022 0927 by Felicia Freeman RN  Sensory Stimulation Regulation: lighting decreased  Reorientation Measures: clock in view   Goal Outcome Evaluation:       Patient returned from surgery around 7:15 this evening to room 431. A&O. Complaining of right abdominal pain at 3/10. Ice in place. Settled in room has 4 lap sites. Report given to oncoming nurse.

## 2022-12-25 NOTE — ANESTHESIA CARE TRANSFER NOTE
Patient: Ifrha Huitron    Procedure: Procedure(s):  CHOLECYSTECTOMY, LAPAROSCOPIC       Diagnosis: Elevated LFTs [R79.89]  Diagnosis Additional Information: No value filed.    Anesthesia Type:   General     Note:    Oropharynx: oropharynx clear of all foreign objects and spontaneously breathing  Level of Consciousness: drowsy and awake  Oxygen Supplementation: face mask  Level of Supplemental Oxygen (L/min / FiO2): 8  Independent Airway: airway patency satisfactory and stable  Dentition: dentition unchanged  Vital Signs Stable: post-procedure vital signs reviewed and stable  Report to RN Given: handoff report given  Patient transferred to: PACU    Handoff Report: Identifed the Patient, Identified the Reponsible Provider, Reviewed the pertinent medical history, Discussed the surgical course, Reviewed Intra-OP anesthesia mangement and issues during anesthesia, Set expectations for post-procedure period and Allowed opportunity for questions and acknowledgement of understanding      Vitals:  Vitals Value Taken Time   /95 12/24/22 1822   Temp 36.2  C (97.1  F) 12/24/22 1822   Pulse 75 12/24/22 1826   Resp 12 12/24/22 1826   SpO2 100 % 12/24/22 1826   Vitals shown include unvalidated device data.    Electronically Signed By: JEVON Castañeda CRNA  December 24, 2022  6:28 PM

## 2022-12-25 NOTE — DISCHARGE SUMMARY
[facility]  Hospitalist Discharge Summary      Date of Admission:  12/22/2022  Date of Discharge:  12/25/2022  Discharging Provider: Brook East MD  Discharge Service: Hospitalist Service    Discharge Diagnoses   Acute cholecystitis  Cholelithiasis    Follow-ups Needed After Discharge   Follow-up Appointments     Follow-up and recommended labs and tests       Follow up with primary care provider, Sukhdev Kohler, within 7 days   to evaluate after surgery, for hospital follow- up, and regarding new   diagnosis.  The following labs/tests are recommended: cbc, bmp, LFT,   magnesium.    Follow up with surgeon as instructed             Unresulted Labs Ordered in the Past 30 Days of this Admission     Date and Time Order Name Status Description    12/22/2022 11:25 AM Blood Culture Portacath, Proximal Preliminary     12/22/2022 11:25 AM Blood Culture Arm, Left Preliminary     11/25/2022 10:45 AM PREPARE PHERESED PLATELETS (UNIT) Preliminary     11/25/2022 10:45 AM PREPARE RED BLOOD CELLS (UNIT) Preliminary       These results will be followed up by PCP and surgeon    Discharge Disposition   Discharged to home  Condition at discharge: [facility]      Hospital Course   Ifrah Huitron is a 72 year old male with past medical history significant for metastatic spindle cell sarcoma, malignant mesothelioma, mediastinal lymphadenopathy, left vocal fold paralysis, central hypothyroidism, GERD, kidney stones, and DJD who was admitted on 12/22/2022 as a transfer from [facility]. He was found to have fever, weakness, and an episode of presyncope 1 day prior to arrival.  He was seen by his infusion center and noted to have elevated LFTs and told to come to the ED, after which she was transferred here.     Presyncope, weakness  Fever-resolved  Elevated LFTs  Cholelithiasis  Acutecholecystitis  -Abnormal MRCP 12/22: Mild biliary ductal dilation, possible gallbladder sludge in the distal common bile  duct, gallbladder sludge within the gallbladder, common bile duct 9 mm  -monitor LFTs  -Has angioedema and hives from penicillins, started on meropenem, has possible crossover reactions so hypersensitivity protocol is in place  -Started on meropenem due to severe penicillin allergy  -GI consulted: ERCP done 12/23  -surgical consultation: Lap cholecystectomy on 12/24  -on the day of discharge, pain in control. Surgeon ok to discharge pt and no need for further antibiotics.      Metastatic spindle cell sarcoma  -Diagnosed in March 2021, follows outpatient with the [facility] cancer [facility] in [facility]  -His chemotherapy has been on hold for the past month due to associated toxicities  -Mediastinal mass, known lung metastases, splenic mass, liver metastases  -CT 12/22: Several new pulmonary nodules, slightly larger mass involving the spleen, stable anterior mediastinal mass     New pancreatic masses  -MRCP 12/22: A few tiny cystic lesions in the pancreas suggesting small cystic neoplasms  -Recommend 1 year follow-up with MRI for surveillance  -outpatient oncologist consultation     Essential hypertension  -Continue PTA amlodipine 5 mg daily     Hypopituitarism  Hypothyroidism  -Continue PTA hydrocortisone, Synthroid     Depression  -Continue PTA Lexapro      Consultations This Hospital Stay   GASTROENTEROLOGY IP CONSULT  CARE MANAGEMENT / SOCIAL WORK IP CONSULT  PHYSICAL THERAPY ADULT IP CONSULT  OCCUPATIONAL THERAPY ADULT IP CONSULT  SURGERY GENERAL IP CONSULT    Code Status   Full Code    Time Spent on this Encounter   I, Brook East MD, personally saw the patient today and spent greater than 30 minutes discharging this patient.       Brook East MD  [address] [address]  Phone: [phone]  Fax: [phone]  ______________________________________________________________________    Physical Exam   Vital Signs: Temp: 97.8  F  (36.6  C) Temp src: Oral BP: (!) 161/85 Pulse: 82   Resp: 19 SpO2: 98 % O2 Device: None (Room air) Oxygen Delivery: 6 LPM  Weight: 137 lbs 12.8 oz  General.  Awake alert oriented not in acute distress.  HEENT.  Pupils equal round react to light, anicteric, EOM intact.  Neck supple no JVD.  CVS regular rhythm no murmur gallops.  Lungs.  Clear to auscultation bilateral no wheezing or rales.  Abdomen.  Soft nontender bowel sounds present. S/p lap carmelita  Extremities.  No edema no calf tenderness.  Neurological.  Awake and alert. No focal deficit.  Skin no rash. No pallor.  Psych. Normal mood.          Primary Care Physician   Sukhdev Kohler    Discharge Orders      Reason for your hospital stay    Cholelithiasis  Cholecystitis     Follow-up and recommended labs and tests     Follow up with primary care provider, Sukhdev Kohler, within 7 days to evaluate after surgery, for hospital follow- up, and regarding new diagnosis.  The following labs/tests are recommended: cbc, bmp, LFT, magnesium.    Follow up with surgeon as instructed     Activity    Your activity upon discharge: activity as tolerated     When to contact your care team    Call your primary doctor if you have any of the following: temperature greater than 100.5f or less than 96f, increased drainage, increased swelling, or increased pain.     Diet    Follow this diet upon discharge: Orders Placed This Encounter      Room Service      Regular Diet Adult       Significant Results and Procedures   Most Recent 3 CBC's:Recent Labs   Lab Test 12/24/22  1202 12/23/22  0545 12/22/22  1053   WBC 11.7* 6.7 8.0   HGB 8.7* 8.2* 9.7*   MCV 95 97 97    130* 143*     Most Recent 3 BMP's:Recent Labs   Lab Test 12/25/22  0632 12/25/22  0225 12/25/22  0030 12/24/22  2137 12/23/22  1822 12/23/22  0545 12/22/22  1053 12/14/22  0926   NA  --   --   --   --   --  137 137 141   POTASSIUM  --   --   --   --   --  4.3 3.9 3.8   CHLORIDE  --   --   --   --   --  103 102  105   CO2  --   --   --   --   --  23 25 25   BUN  --   --   --   --   --  33.6* 30.8* 23.4*   CR  --   --  1.29*  --   --  1.50* 1.58* 1.47*   ANIONGAP  --   --   --   --   --  11 10 11   COTY  --   --   --   --   --  8.8 9.3 9.3   * 155*  --  153*   < > 135* 107* 87    < > = values in this interval not displayed.     Most Recent 2 LFT's:Recent Labs   Lab Test 12/24/22  1202 12/23/22  0545   AST 39 73*   * 148*   ALKPHOS 456* 527*   BILITOTAL 0.3 0.3     7-Day Micro Results     Collected Updated Procedure Result Status      12/22/2022 1135 12/24/2022 1403 Blood Culture Portacath, Proximal [[id number]]   Blood from Portacath, Proximal    Preliminary result Component Value   Culture No growth after 2 days  [P]                12/22/2022 1132 12/24/2022 1403 Blood Culture Arm, Left [[id number]]   Blood from Arm, Left    Preliminary result Component Value   Culture No growth after 2 days  [P]                12/22/2022 1126 12/22/2022 1157 Symptomatic Influenza A/B & SARS-CoV2 (COVID-19) Virus PCR Multiplex Nasopharyngeal [[id number]]    Swab from Nasopharyngeal    Final result Component Value   Influenza A PCR Negative   Influenza B PCR Negative   SARS CoV2 PCR Negative   NEGATIVE: SARS-CoV-2 (COVID-19) RNA not detected, presumed negative.              ,   Results for orders placed or performed during the hospital encounter of 12/22/22   XR Surgery LO Fluoro G/T 5 Min w Stills    Narrative    EXAM: XR SURGERY LO FLUORO GREATER THAN 5 MIN W STILLS  LOCATION: [facility]  DATE/TIME: 12/23/2022 2:54 PM    INDICATION: intra op  COMPARISON: MRCP 12/22/2022  TECHNIQUE: Exam performed by gastroenterologist.    FLUOROSCOPIC TIME: 2.8 minutes  NUMBER OF IMAGES: 7    FINDINGS:    BILE DUCTS: Diffusely dilated up to 1.5 cm. A rounded filling defect in the distal common bile duct.  PANCREATIC DUCT: Not imaged.      Impression    IMPRESSION:  Biliary dilation with filling defect in the  distal common bile duct, which was reportedly removed. Refer to ERCP report for full details.       Discharge Medications   Current Discharge Medication List      START taking these medications    Details   !! acetaminophen (TYLENOL) 325 MG tablet Take 2 tablets (650 mg) by mouth every 6 hours as needed for mild pain or other (and adjunct with moderate or severe pain or per patient request)    Associated Diagnoses: Acute cholecystitis      HYDROcodone-acetaminophen (NORCO) 5-325 MG tablet Take 1 tablet by mouth every 6 hours as needed for moderate pain (4-6)  Qty: 10 tablet, Refills: 0    Associated Diagnoses: Acute cholecystitis      senna-docusate (SENOKOT-S/PERICOLACE) 8.6-50 MG tablet Take 1 tablet by mouth 2 times daily as needed for constipation  Qty: 30 tablet, Refills: 1    Associated Diagnoses: Acute cholecystitis       !! - Potential duplicate medications found. Please discuss with provider.      CONTINUE these medications which have NOT CHANGED    Details   !! acetaminophen (TYLENOL) 500 MG tablet Take 1,000 mg by mouth every 8 hours as needed for mild pain      amLODIPine (NORVASC) 5 MG tablet Take 1 tablet (5 mg) by mouth daily  Qty: 30 tablet, Refills: 3    Associated Diagnoses: Benign essential hypertension      aspirin-acetaminophen-caffeine (EXCEDRIN MIGRAINE) 250-250-65 MG tablet Take 1 tablet by mouth daily as needed for headaches      Aspirin-Caffeine (JUAN A BACK & BODY PO) Take 2 tablets by mouth 2 times daily as needed      celecoxib (CELEBREX) 200 MG capsule Take 1 capsule (200 mg) by mouth 2 times daily  Qty: 60 capsule, Refills: 3    Associated Diagnoses: Cancer associated pain      doxepin (SINEQUAN) 10 MG/ML (HIGH CONC) solution Take 2.5 mLs (25 mg) by mouth 2 times daily as needed (rinse for mucositis) Dilute the 2.5 mL of doxepin to 5 ml total in sterile or distilled water.  Qty: 118 mL, Refills: 0    Associated Diagnoses: Mucositis      escitalopram (LEXAPRO) 10 MG tablet Take 1 tablet  (10 mg) by mouth daily  Qty: 90 tablet, Refills: 3    Associated Diagnoses: Chemotherapy-induced neutropenia (H); Spindle cell sarcoma (H)      guaiFENesin-codeine (GUAIFENESIN AC) 100-10 MG/5ML syrup Take 10 mLs by mouth every 4 hours as needed for cough  Qty: 118 mL, Refills: 0    Associated Diagnoses: Vocal fold paralysis, left; Dysphonia; Muscle tension dysphonia; Mesothelioma, malignant (H); Cough      hydrocortisone (CORTEF) 10 MG tablet Take 20 mg in the morning and 10 mg in the afternoon  Qty: 270 tablet, Refills: 3    Associated Diagnoses: Hypopituitarism (H)      levothyroxine (SYNTHROID/LEVOTHROID) 75 MCG tablet Take 1 tablet (75 mcg) by mouth every morning  Qty: 90 tablet, Refills: 3    Associated Diagnoses: Hypopituitarism (H)      Menthol-Methyl Salicylate (DALIA MALIK GREASELESS) cream Apply topically every 6 hours as needed      nystatin (MYCOSTATIN) 008093 UNIT/ML suspension Swish and spit 500,000 Units in mouth 4 times daily      omeprazole (PRILOSEC) 20 MG DR capsule Take 40 mg by mouth daily  Qty: 60 capsule, Refills: 1      ondansetron (ZOFRAN) 4 MG tablet Take 1-2 tablets (4-8 mg) by mouth every 8 hours as needed for nausea  Qty: 60 tablet, Refills: 3    Associated Diagnoses: Chemotherapy-induced nausea      phosphorus tablet 250 mg (PHOSPHA 250 NEUTRAL) 250 MG per tablet Take 1 tablet (250 mg) by mouth 2 times daily  Qty: 60 tablet, Refills: 3    Associated Diagnoses: Hypophosphatemia      potassium chloride ER (KLOR-CON M) 20 MEQ CR tablet Take 1 tablet (20 mEq) by mouth daily  Qty: 90 tablet, Refills: 3    Associated Diagnoses: Hypokalemia      prochlorperazine (COMPAZINE) 5 MG tablet Take 1 tablet (5 mg) by mouth every 6 hours as needed for nausea or vomiting . Caution: causes sedation.  Qty: 30 tablet, Refills: 1    Associated Diagnoses: Chemotherapy-induced nausea      SUMAtriptan (IMITREX) 50 MG tablet Take 1 tablet (50 mg) by mouth at onset of headache for migraine May repeat in 2 hours.  "Max 4 tablets/24 hours.  Qty: 10 tablet, Refills: 3    Associated Diagnoses: Migraine with aura and without status migrainosus, not intractable      triamcinolone (KENALOG) 0.1 % external cream Apply topically 2 times daily to bothersome skin areas.  Qty: 30 g, Refills: 3    Associated Diagnoses: Drug rash      Insulin Syringe-Needle U-100 27.5G X 5/8\" 2 ML MISC 1 each daily as needed (with solucortef for adrenal crisis)  Qty: 10 each, Refills: 1    Associated Diagnoses: Adrenal insufficiency (H)       !! - Potential duplicate medications found. Please discuss with provider.      STOP taking these medications       clotrimazole (MYCELEX) 10 MG lozenge Comments:   Reason for Stopping:         metroNIDAZOLE (METROGEL) 1 % external gel Comments:   Reason for Stopping:             Allergies   Allergies   Allergen Reactions     Penicillins Hives and Swelling     Occurred as small child   Pt tolerated meropenem 12/23/22       "

## 2022-12-25 NOTE — PLAN OF CARE
Problem: Plan of Care - These are the overarching goals to be used throughout the patient stay.    Goal: Absence of Hospital-Acquired Illness or Injury  Intervention: Prevent Skin Injury  Recent Flowsheet Documentation  Taken 12/24/2022 1958 by Emi Green RN  Body Position:   position changed independently   supine, head elevated     Problem: Plan of Care - These are the overarching goals to be used throughout the patient stay.    Goal: Optimal Comfort and Wellbeing  Outcome: Progressing     Problem: Plan of Care - These are the overarching goals to be used throughout the patient stay.    Goal: Readiness for Transition of Care  Outcome: Progressing     Goal Outcome Evaluation: Pt alert and oriented, transferred to me at 7:30 pm, reported pain of 5/10, celecoxib given at 2014 with relief. Lap sites clean, dry and intact, LR infusing at 100ml/hr.

## 2022-12-25 NOTE — PLAN OF CARE
Goal Outcome Evaluation:      Problem: Pain Acute  Goal: Optimal Pain Control and Function  Outcome: Progressing     Problem: Risk for Delirium  Goal: Improved Sleep  Outcome: Progressing     A/o and pleasant. C/o abdominal pain in right lap site. Patient received prn hydrocodone and hydromorphone (see mar). No c/o n/v. Continuous LR infusion still running. Slept for most of the night.

## 2022-12-25 NOTE — PROGRESS NOTES
ASSESSMENT:  1. Elevated LFTs    2. Abnormal magnetic resonance cholangiopancreatography (MRCP)        Ifrah Huitron is a 72 year old male who is s/p laparoscopic cholecystectomy postop day #1 with Dr. More.  Doing well.    PLAN:  -Okay from surgery standpoint to discharge and our discharge recommendations have been placed  -Regular diet as tolerated  -Encourage ambulation  -No further antibiotics are needed from our standpoint.    SUBJECTIVE:   He is feeling well.  He is little sore and feels better when he just lays still.  No nausea.  Tolerating full liquids.  Passing gas.  Urinating.  No significant events overnight.      Patient Vitals for the past 24 hrs:   BP Temp Temp src Pulse Resp SpO2   12/25/22 0926 (!) 161/85 -- -- -- -- --   12/25/22 0837 (!) 161/85 97.8  F (36.6  C) Oral 82 19 98 %   12/25/22 0039 (!) 151/87 -- -- 73 -- --   12/25/22 0017 (!) 177/92 97.6  F (36.4  C) Oral 83 19 100 %   12/24/22 2058 130/72 97.5  F (36.4  C) Oral 94 16 99 %   12/24/22 1940 (!) 161/86 97.4  F (36.3  C) Oral 80 16 97 %   12/24/22 1915 (!) 179/86 97.5  F (36.4  C) Oral 79 16 96 %   12/24/22 1846 (!) 160/85 97.4  F (36.3  C) Temporal 82 15 100 %   12/24/22 1832 (!) 170/94 -- -- 75 (!) 31 100 %   12/24/22 1822 (!) 172/95 97.1  F (36.2  C) Temporal 76 13 100 %   12/24/22 1604 (!) 157/76 97.9  F (36.6  C) Tympanic 77 12 98 %   12/24/22 1531 (!) 147/83 97.6  F (36.4  C) Oral 77 16 98 %         PHYSICAL EXAM:  GEN: No acute distress, comfortable    ABD: Soft generalized tenderness expected     Output by Drain (mL) 12/23/22 0700 - 12/23/22 1459 12/23/22 1500 - 12/23/22 2259 12/23/22 2300 - 12/24/22 0659 12/24/22 0700 - 12/24/22 1459 12/24/22 1500 - 12/24/22 2259 12/24/22 2300 - 12/25/22 0659 12/25/22 0700 - 12/25/22 1007   Patient has no LDAs of requested type attached.      EXT:No cyanosis, edema or obvious abnormalities    12/24 0700 - 12/25 0659  In: 1586.67 [I.V.:1586.67]  Out: -     Admission on 12/22/2022   Component Date  Value     Sodium 12/23/2022 137      Potassium 12/23/2022 4.3      Chloride 12/23/2022 103      Carbon Dioxide (CO2) 12/23/2022 23      Anion Gap 12/23/2022 11      Urea Nitrogen 12/23/2022 33.6 (H)      Creatinine 12/23/2022 1.50 (H)      Calcium 12/23/2022 8.8      Glucose 12/23/2022 135 (H)      Alkaline Phosphatase 12/23/2022 527 (H)      AST 12/23/2022 73 (H)      ALT 12/23/2022 148 (H)      Protein Total 12/23/2022 5.6 (L)      Albumin 12/23/2022 3.2 (L)      Bilirubin Total 12/23/2022 0.3      GFR Estimate 12/23/2022 49 (L)      WBC Count 12/23/2022 6.7      RBC Count 12/23/2022 2.70 (L)      Hemoglobin 12/23/2022 8.2 (L)      Hematocrit 12/23/2022 26.1 (L)      MCV 12/23/2022 97      MCH 12/23/2022 30.4      MCHC 12/23/2022 31.4 (L)      RDW 12/23/2022 18.0 (H)      Platelet Count 12/23/2022 130 (L)      ERCP 12/23/2022                      Value:[facility]  [address] [place] [address]  _______________________________________________________________________________  Patient Name: Ifrah Huitron              Procedure Date: 12/23/2022 2:13 PM  MRN: [id number]                       Account Number: [id number]  YOB: 1950              Admit Type: Inpatient  Age: 72                               Room: [place] [address]  Note Status: Finalized                Attending MD: ARTIS SMITH MD  Instrument Name: ERCP scope 9216        _______________________________________________________________________________     Procedure:             ERCP  Indications:           Bile duct stone(s)  Providers:             ARTIS SMITH MD  Referring MD:          MARQUES ESTRELLA MD  Medicines:             General Anesthesia  Complications:         No immediate complications.  _______________________________________________________________________________  Procedure:             Pre-Anesthesia A                          ssessment:                         - Pre-procedure  physical examination revealed no                          contraindications to sedation.                         After obtaining informed consent, the scope was passed                          under direct vision. Throughout the procedure, the                          patient's blood pressure, pulse, and oxygen                          saturations were monitored continuously. The ERCP                          scope was introduced through the mouth, and used to                          inject contrast into and used to inject contrast into                          the bile, dorsal and ventral pancreatic ducts. The                          ERCP was accomplished without difficulty. The patient                          tolerated the procedure well.                                                                                   Findings:       The  film was normal. The esophagus was successfully intubated        under direct vision. T                          he scope was advanced to a normal major papilla in        the descending duodenum without detailed examination of the pharynx,        larynx and associated structures, and upper GI tract. The upper GI tract        was grossly normal. The sphincterotome and wire were used to cannulate.        There was preferential cannulation of the pancreatic duct. A small        amount of contrast was injected. There was an ansa loop that precluded        complete wire cannulation. Given this, a needle knife was used to        pre-cut into the bile duct. After wire cannulation was obtained, a        cholangiogram revealed a 15mm bile duct with a large distal filling        defect was present. A completion biliary sphincterotome was then done        with a standard sphincterotome. The sphincter was then dilated to 10mm.        A 15mm balloon was used to sweep the duct. Sludge was removed. Occlusion        cholangiogram was without further filling defects. Excellent drainage         was achieved. The cy                          stic duct did not fill the gallbladder. The        intrahepatic ducts were normal.                                                                                   Moderate Sedation:       An independent trained observer was present and continuously monitored        the patient.  Impression:            - Choledocholithiasis with an obstruction was found.                          Complete removal was accomplished by biliary                          sphincterotomy, dilation and balloon extraction.  Recommendation:        - Return patient to hospital trinh for ongoing care.                         - Indocin pr.                         - Cholecystectomy per surgical service.                                                                                     Artis Smith MD  ________________________  ARTIS SMITH MD  12/23/2022 2:48:20 PM  I was physically present for the entire viewing portion of the exam.  __________________________  Signature of teaching physician  B4c/D4                          Priscilla SMITH MD  Number of Addenda: 0    Note Initiated On: 12/23/2022 2:13 PM  Scope In: 2:18:07 PM  Scope Out: 2:39:10 PM       GLUCOSE BY METER POCT 12/23/2022 180 (H)      Protein Total 12/24/2022 5.8 (L)      Albumin 12/24/2022 3.4 (L)      Bilirubin Total 12/24/2022 0.3      Alkaline Phosphatase 12/24/2022 456 (H)      AST 12/24/2022 39      ALT 12/24/2022 109 (H)      Bilirubin Direct 12/24/2022 <0.20      WBC Count 12/24/2022 11.7 (H)      RBC Count 12/24/2022 2.85 (L)      Hemoglobin 12/24/2022 8.7 (L)      Hematocrit 12/24/2022 27.1 (L)      MCV 12/24/2022 95      MCH 12/24/2022 30.5      MCHC 12/24/2022 32.1      RDW 12/24/2022 18.1 (H)      Platelet Count 12/24/2022 152      Creatinine 12/25/2022 1.29 (H)      GFR Estimate 12/25/2022 58 (L)      GLUCOSE BY METER POCT 12/24/2022 153 (H)      GLUCOSE BY METER POCT 12/25/2022 155 (H)       GLUCOSE BY METER POCT 12/25/2022 136 (H)           SHARON Samayoa  [facility]  [address] [place] [address]  Phone- [phone]  Fax- [phone]

## 2022-12-25 NOTE — ANESTHESIA POSTPROCEDURE EVALUATION
Patient: Ifrah Huitron    Procedure: Procedure(s):  CHOLECYSTECTOMY, LAPAROSCOPIC       Anesthesia Type:  General    Note:  Disposition: Inpatient   Postop Pain Control: Uneventful            Sign Out: Well controlled pain   PONV: No   Neuro/Psych: Uneventful            Sign Out: Acceptable/Baseline neuro status   Airway/Respiratory: Uneventful            Sign Out: Acceptable/Baseline resp. status   CV/Hemodynamics: Uneventful            Sign Out: Acceptable CV status; No obvious hypovolemia; No obvious fluid overload   Other NRE: NONE   DID A NON-ROUTINE EVENT OCCUR? No           Last vitals:  Vitals Value Taken Time   /85 12/24/22 1846   Temp 36.3  C (97.4  F) 12/24/22 1846   Pulse 73 12/24/22 1851   Resp 8 12/24/22 1851   SpO2 98 % 12/24/22 1851   Vitals shown include unvalidated device data.    Electronically Signed By: Mika Torres MD  December 25, 2022  6:25 AM

## 2022-12-26 PROBLEM — A41.9 SEPSIS (H): Status: ACTIVE | Noted: 2022-01-01

## 2022-12-26 PROBLEM — D62 POSTOPERATIVE ANEMIA DUE TO ACUTE BLOOD LOSS: Status: ACTIVE | Noted: 2022-01-01

## 2022-12-26 PROBLEM — N18.31 STAGE 3A CHRONIC KIDNEY DISEASE (H): Status: ACTIVE | Noted: 2022-01-01

## 2022-12-26 NOTE — ED PROVIDER NOTES
History     Chief Complaint   Patient presents with     Abdominal Pain     Hernia repair on Saturday, at [facility], increased abd pain today see triage note     HPI  Ifrah Huitron is a 72 year old male with history complex history significant for metastatic spindle cell carcinoma, malignant mesothelioma, mediastinal lymphadenopathy, GERD, renal stones, choledocholithiasis s/p ERCP on 12/23/22, and lap carmelita on 12/24 and was discharged yesterday morning now with left sided abdominal pain which began last night. Associated nausea but no vomiting. No fever or chills.     The patient's PMHx, Surgical Hx, Allergies, and Medications were all reviewed with the patient.    ERCP 12/22/2022  Impression:            - Choledocholithiasis with an obstruction was found.                          Complete removal was accomplished by biliary                          sphincterotomy, dilation and balloon extraction.   Recommendation:        - Return patient to hospital [facility] for ongoing care.                          - Indocin pr.                          - Cholecystectomy per surgical service.     Allergies:  Allergies   Allergen Reactions     Penicillins Hives and Swelling     Occurred as small child   Pt tolerated meropenem 12/23/22         Problem List:    Patient Active Problem List    Diagnosis Date Noted     Elevated LFTs 12/22/2022     Priority: Medium     Acute renal failure, unspecified acute renal failure type (H) 11/25/2022     Priority: Medium     Secondary adrenal insufficiency (H) 04/25/2022     Priority: Medium     History of pituitary surgery 04/25/2022     Priority: Medium     Anemia in neoplastic disease 02/14/2022     Priority: Medium     Thrush 01/11/2022     Priority: Medium     Encounter for other specified aftercare 12/07/2021     Priority: Medium     Sarcoma (H) 12/01/2021     Priority: Medium     Chemotherapy-induced neutropenia (H) 11/04/2021     Priority: Medium     Chemotherapy-induced nausea 11/02/2021      Priority: Medium     Hypophosphatemia 11/02/2021     Priority: Medium     Anemia 11/02/2021     Priority: Medium     Vocal fold paralysis, left 06/21/2021     Priority: Medium     Added automatically from request for surgery [id number]       Dysphonia 06/21/2021     Priority: Medium     Added automatically from request for surgery [id number]       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     Priority: Medium     Added automatically from request for surgery [id number]       Mediastinal lymphadenopathy 06/21/2021     Priority: Medium     Added automatically from request for surgery [id number]       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     Priority: Medium     Mesothelioma, malignant (H) 03/26/2021     Priority: Medium     TUYET (generalized anxiety disorder) 05/23/2017     Priority: Medium     Degeneration of lumbar or lumbosacral intervertebral disc 01/04/2016     Priority: Medium     Osteoarthritis of spine with radiculopathy, lumbar region 01/04/2016     Priority: Medium     Epidural lipomatosis 12/03/2015     Priority: Medium     Chronic lower back pain 12/03/2015     Priority: Medium     Hypopituitarism (H) 08/25/2010     Priority: Medium     Central hypothyroidism 08/16/2010     Priority: Medium     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 Thyroid started 4/10  Low cortisol [1] 5/7/10 so secondary adrenal insufficiency  hypophysectomy 8/23/10    Formatting of this note might be different from the original.  1.9 cm  Formatting of this note might be different from the original.  Macroadenoma of 1.9 cm in largest dimension noted 4/10  Likely central thyroid and HGH deficiency. Thyroid started 4/10  Low cortisol [1] 5/7/10 so secondary adrenal insufficiency  hypophysectomy 8/23/10       Eczema 01/12/2009     Priority: Medium     Calculus of kidney 09/09/2004     Priority: Medium     Rosacea 09/09/2004      Priority: Medium     Inguinal hernia 09/09/2004     Priority: Medium     Formatting of this note might be different from the original.  Epic          Past Medical History:    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     Thyroid disease        Past Surgical History:    Past Surgical History:   Procedure Laterality Date     COLONOSCOPY N/A 12/17/2020    Procedure: COLONOSCOPY;  Surgeon: Ken Camacho MD;  Location: [facility]     ENDOSCOPIC RETROGRADE CHOLANGIOPANCREATOGRAM N/A 12/23/2022    Procedure: ENDOSCOPIC RETROGRADE CHOLANGIOPANCREATOGRAPHY;  Surgeon: Jim Lamar MD;  Location: [facility] OR     ENT SURGERY       HERNIA REPAIR       INSERT PORT VASCULAR ACCESS Right 1/28/2022    Procedure: INSERTION, VASCULAR ACCESS PORT;  Surgeon: Daniel Kinney MD;  Location: [facility] OR     IR CHEST PORT PLACEMENT > 5 YRS OF AGE  1/28/2022     LAPAROSCOPIC CHOLECYSTECTOMY N/A 12/24/2022    Procedure: CHOLECYSTECTOMY, LAPAROSCOPIC;  Surgeon: Froy More DO;  Location: [facility] OR     LARYNGOSCOPY, EXCISE VOCAL CORD LESION MICROSCOPIC, COMBINED Left 07/01/2021    Procedure: MICROLARYNGOSCOPY, LEFT TRUE VOCAL CORD INJECTION WITH PROLARYN;  Surgeon: Kyree Bearden MD;  Location: [facility]     PHACOEMULSIFICATION WITH STANDARD INTRAOCULAR LENS IMPLANT Right 03/10/2021    Procedure: Cataract removal with implant.;  Surgeon: Jamir Mac MD;  Location: [facility]     PHACOEMULSIFICATION WITH STANDARD INTRAOCULAR LENS IMPLANT Left 04/05/2021    Procedure: Cataract removal with implant.;  Surgeon: aJmir Mac MD;  Location: [facility]     PICC DOUBLE LUMEN PLACEMENT Right 12/01/2021    5FR DL PICC, basilic vein. L-38cm, 1cm out.     PICC DOUBLE LUMEN PLACEMENT Right 01/04/2022    Right cephalic, 41 cm, 1 external length     PITUITARY EXCISION       tooth pulled 4/7  Right        Family History:    Family  "History   Problem Relation Age of Onset     Lupus Mother      ALS Father      Rheumatoid Arthritis Sister        Social History:  Marital Status:   [2]  Social History     Tobacco Use     Smoking status: Never     Smokeless tobacco: Never   Substance Use Topics     Alcohol use: Yes     Comment: rare     Drug use: Never        Medications:    acetaminophen (TYLENOL) 325 MG tablet  acetaminophen (TYLENOL) 500 MG tablet  amLODIPine (NORVASC) 5 MG tablet  aspirin-acetaminophen-caffeine (EXCEDRIN MIGRAINE) 250-250-65 MG tablet  Aspirin-Caffeine (JUAN A BACK & BODY PO)  celecoxib (CELEBREX) 200 MG capsule  doxepin (SINEQUAN) 10 MG/ML (HIGH CONC) solution  escitalopram (LEXAPRO) 10 MG tablet  guaiFENesin-codeine (GUAIFENESIN AC) 100-10 MG/5ML syrup  HYDROcodone-acetaminophen (NORCO) 5-325 MG tablet  hydrocortisone (CORTEF) 10 MG tablet  Insulin Syringe-Needle U-100 27.5G X 5/8\" 2 ML MISC  levothyroxine (SYNTHROID/LEVOTHROID) 75 MCG tablet  Menthol-Methyl Salicylate (DALIA MALIK GREASELESS) cream  nystatin (MYCOSTATIN) 491048 UNIT/ML suspension  omeprazole (PRILOSEC) 20 MG DR capsule  ondansetron (ZOFRAN) 4 MG tablet  phosphorus tablet 250 mg (PHOSPHA 250 NEUTRAL) 250 MG per tablet  potassium chloride ER (KLOR-CON M) 20 MEQ CR tablet  prochlorperazine (COMPAZINE) 5 MG tablet  senna-docusate (SENOKOT-S/PERICOLACE) 8.6-50 MG tablet  SUMAtriptan (IMITREX) 50 MG tablet  triamcinolone (KENALOG) 0.1 % external cream          Review of Systems  A complete review of systems performed and is otherwise negative except as noted in the HPI    Physical Exam   BP: 134/82  Pulse: 118  Temp: 98  F (36.7  C)  Resp: 18  Weight: 62.1 kg (137 lb)  SpO2: 100 %    Physical Exam  GEN: Awake, alert, and cooperative. Appears acutely distressed.   HENT: MMM. External ears and nose normal bilaterally.  EYES: EOM intact. Conjunctiva clear. No discharge.   NECK: Symmetric, freely mobile.   CV : Regular rate and rhythm.  PULM: Normal effort. No " wheezes, rales, or rhonchi bilaterally.  ABD: surgical wounds covered. Left sided abdominal tenderness. Bruising to left lateral abdominal wall and right lateral abdominal wall. No guarding or rebound.   RECTAL: black stool on gloved finger. Guaiac positive.   NEURO: Normal speech. Following commands. CN II-XII grossly intact. Answering questions and interacting appropriately.   EXT: No gross deformity. Warm and well perfused.  INT: Warm. No diaphoresis. Normal color.        ED Course        Procedures         Critical Care time:  was 40 minutes for this patient excluding procedures.               Results for orders placed or performed during the hospital encounter of 12/26/22 (from the past 24 hour(s))   [place] Draw    Narrative    The following orders were created for panel order [place] Draw.  Procedure                               Abnormality         Status                     ---------                               -----------         ------                     Extra Blue Top Tube[[id number]]                              Final result               Extra Red Top Tube[[id number]]                               Final result               Extra Green Top (Lithium...[[id number]]                      Final result               Extra Purple Top Tube[[id number]]                            Final result                 Please view results for these tests on the individual orders.   CBC with platelets, differential    Narrative    The following orders were created for panel order CBC with platelets, differential.  Procedure                               Abnormality         Status                     ---------                               -----------         ------                     CBC with platelets and d...[[id number]]  Abnormal            Final result               Manual Differential[[id number]]          Abnormal            Final result                 Please view results for these tests on the individual orders.    Comprehensive metabolic panel   Result Value Ref Range    Sodium 141 136 - 145 mmol/L    Potassium 3.4 3.4 - 5.3 mmol/L    Chloride 106 98 - 107 mmol/L    Carbon Dioxide (CO2) 26 22 - 29 mmol/L    Anion Gap 9 7 - 15 mmol/L    Urea Nitrogen 44.8 (H) 8.0 - 23.0 mg/dL    Creatinine 1.29 (H) 0.67 - 1.17 mg/dL    Calcium 8.9 8.8 - 10.2 mg/dL    Glucose 135 (H) 70 - 99 mg/dL    Alkaline Phosphatase 497 (H) 40 - 129 U/L    AST 59 (H) 10 - 50 U/L     (H) 10 - 50 U/L    Protein Total 5.4 (L) 6.4 - 8.3 g/dL    Albumin 3.2 (L) 3.5 - 5.2 g/dL    Bilirubin Total 0.3 <=1.2 mg/dL    GFR Estimate 58 (L) >60 mL/min/1.73m2   Extra Blue Top Tube   Result Value Ref Range    Hold Specimen JIC    Extra Red Top Tube   Result Value Ref Range    Hold Specimen JIC    Extra Green Top (Lithium Heparin) Tube   Result Value Ref Range    Hold Specimen JIC    Extra Purple Top Tube   Result Value Ref Range    Hold Specimen JIC    CBC with platelets and differential   Result Value Ref Range    WBC Count 18.5 (H) 4.0 - 11.0 10e3/uL    RBC Count 2.16 (L) 4.40 - 5.90 10e6/uL    Hemoglobin 6.7 (LL) 13.3 - 17.7 g/dL    Hematocrit 21.0 (L) 40.0 - 53.0 %    MCV 97 78 - 100 fL    MCH 31.0 26.5 - 33.0 pg    MCHC 31.9 31.5 - 36.5 g/dL    RDW 18.3 (H) 10.0 - 15.0 %    Platelet Count 141 (L) 150 - 450 10e3/uL   Manual Differential   Result Value Ref Range    % Neutrophils 84 %    % Lymphocytes 9 %    % Monocytes 6 %    % Eosinophils 0 %    % Basophils 0 %    % Metamyelocytes 1 %    Absolute Neutrophils 15.5 (H) 1.6 - 8.3 10e3/uL    Absolute Lymphocytes 1.7 0.8 - 5.3 10e3/uL    Absolute Monocytes 1.1 0.0 - 1.3 10e3/uL    Absolute Eosinophils 0.0 0.0 - 0.7 10e3/uL    Absolute Basophils 0.0 0.0 - 0.2 10e3/uL    Absolute Metamyelocytes 0.2 (H) <=0.0 10e3/uL    RBC Morphology Confirmed RBC Indices     Platelet Assessment  Automated Count Confirmed. Platelet morphology is normal.     Automated Count Confirmed. Platelet morphology is normal.   Procalcitonin    Result Value Ref Range    Procalcitonin 0.47 (H) <0.05 ng/mL   CT Abdomen Pelvis w Contrast    Narrative    CT ABDOMEN AND PELVIS WITH CONTRAST 12/26/2022 3:18 PM    CLINICAL HISTORY: Left upper quadrant pain. Recent laparoscopic  cholecystectomy. History of metastatic spindle cell sarcoma and  malignant mesothelioma.  TECHNIQUE: CT scan of the abdomen and pelvis was performed following  injection of IV contrast. Multiplanar reformats were obtained. Dose  reduction techniques were used.  CONTRAST: 61mL of Isovue 370  COMPARISON: CT of the chest, abdomen, and pelvis performed 12/22/2022.    FINDINGS:   LOWER CHEST: The visualized lung bases are clear. Small hiatal hernia.    HEPATOBILIARY: Several small indeterminate hypodensities in the liver  are too small to characterize, but are unchanged. Interval  postoperative changes of cholecystectomy. Pneumobilia is new since the  previous exam, and may be related to a recent ERCP.    PANCREAS: Normal.    SPLEEN: Hypoenhancing mass within the spleen superiorly and  posteriorly has not significantly changed, measured at 9.1 x 8.7 cm.  This mass again abuts the left hemidiaphragm posteriorly as well as  the upper pole of the left kidney.    ADRENAL GLANDS: Normal.    KIDNEYS/BLADDER: Several bilateral renal cysts are unchanged, and  would require no specific follow-up. The kidneys are otherwise  unremarkable. No hydronephrosis.    BOWEL: No bowel obstruction. Scattered sigmoid diverticulosis. No  convincing evidence for colitis or diverticulitis. Unremarkable  appendix.    PELVIC ORGANS: Mild prostatic enlargement. Small amount of nonspecific  free fluid in the pelvis, new since the previous exam.    LYMPH NODES: No enlarged lymph nodes are identified in the abdomen or  pelvis.    VASCULATURE: Mild luminal narrowing in the proximal SMA is not  significantly changed (series 4 image 42).    ADDITIONAL FINDINGS: Free intraperitoneal air anteriorly is likely  related to the  recent laparoscopic cholecystectomy.    MUSCULOSKELETAL: No destructive bony lesions are identified.      Impression    IMPRESSION:   1.  Hypoenhancing splenic mass has not significantly changed,  measuring 9.1 cm, and is again noted to abut the left hemidiaphragm  and upper left kidney.  2.  Interval postoperative changes of laparoscopic cholecystectomy are  noted. Free intraperitoneal air is also likely postoperative.  3.  Pneumobilia is new since the previous exam, and likely related to  a recent ERCP.  4.  Small amount of nonspecific free fluid in the pelvis is new since  the previous exam.  5.  Mild luminal narrowing in the proximal SMA is unchanged, and is of  uncertain clinical significance.    LELIA RODRIGUEZ MD         SYSTEM ID:  [id number]   UA with Microscopic reflex to Culture    Specimen: Urine, Midstream   Result Value Ref Range    Color Urine Yellow Colorless, Straw, Light Yellow, Yellow    Appearance Urine Slightly Cloudy (A) Clear    Glucose Urine Negative Negative mg/dL    Bilirubin Urine Negative Negative    Ketones Urine Negative Negative mg/dL    Specific Gravity Urine 1.015 1.003 - 1.035    Blood Urine Trace (A) Negative    pH Urine 5.5 5.0 - 7.0    Protein Albumin Urine 30 (A) Negative mg/dL    Urobilinogen Urine Normal Normal, 2.0 mg/dL    Nitrite Urine Negative Negative    Leukocyte Esterase Urine Negative Negative    Bacteria Urine Few (A) None Seen /HPF    Mucus Urine Present (A) None Seen /LPF    RBC Urine 1 <=2 /HPF    WBC Urine 1 <=5 /HPF    Narrative    Urine Culture not indicated   ABO/Rh type and screen    Narrative    The following orders were created for panel order ABO/Rh type and screen.  Procedure                               Abnormality         Status                     ---------                               -----------         ------                     Adult Type and Screen[[id number]]                            Final result                 Please view results for these  tests on the individual orders.   Adult Type and Screen   Result Value Ref Range    ABO/RH(D) O POS     Antibody Screen Negative Negative    SPECIMEN EXPIRATION DATE [id number]    Hemaglobin   Result Value Ref Range    Hemoglobin 6.4 (LL) 13.3 - 17.7 g/dL   Occult blood stool   Result Value Ref Range    Occult Blood Positive (A) Negative   Prepare red blood cells (unit)   Result Value Ref Range    Blood Component Type Red Blood Cells     Product Code [id number]     Unit Status Ready for issue     Unit Number [id number]     CROSSMATCH Compatible     CODING SYSTEM [id number]        Medications   ondansetron (ZOFRAN) injection 4 mg (has no administration in time range)   HYDROmorphone (PF) (DILAUDID) injection 0.5 mg (0.5 mg Intravenous Given 12/26/22 1704)   meropenem (MERREM) 1 g vial to attach to  mL bag (0 g Intravenous Stopped 12/26/22 1701)   ondansetron (ZOFRAN) 2 MG/ML injection (4 mg  Given 12/26/22 1338)   iopamidol (ISOVUE-370) solution 61 mL (61 mLs Intravenous Given 12/26/22 1509)   sodium chloride 0.9 % bag 500mL for CT scan flush use (57 mLs Intravenous Given 12/26/22 1510)   pantoprazole (PROTONIX) IV push injection 40 mg (40 mg Intravenous Given 12/26/22 1624)       Assessments & Plan (with Medical Decision Making)   72 year old male with past medical history Significant for metastatic spindle cell carcinoma, ERCP 3 days ago and lap carmelita 2 days ago with one day of left sided abdominal pain.  Particularly distressed on exam, was given 0.5 mg IV hydromorphone with improvement of pain from 10 out of 10 to 1 out of 10 severity.  Left-sided abdominal tenderness on exam.  Surgical sites do not appear acutely infected.    Initial labs concerning for leukocytosis of 18.5 with left shift and hemoglobin of 6.7.  CMP with BUN of 44.8 and creatinine 1.29.  Alk phos elevated 497, AST 59 and .  Urinalysis without evidence of acute infection.  Rectal exam performed and black stool that was guaiac  positive.  Patient likely has GI hemorrhage which could be related to his recent sphincterotomy.  Chart review shows that white count was only elevated to 11.72 days ago and was normal 3 days ago.  Patient has a penicillin allergy and was given IV meropenem empirically after 2 blood cultures were obtained.  Repeat hemoglobin of 6.4 which confirms anemia.  Type and screen pending.  CT scan of abdomen pelvis with no significant change in splenic mass.  Interval postoperative changes of cholecystectomy and small amount of free air which is likely postoperative.  There is also pneumobilia likely related to recent ERCP.  Small amount of nonspecific free fluid in the pelvis is new since previous exam.    Can the patient was recently discharged from [facility] and that GI services at [facility] are diagnostic only I think he would be best served from admission at [facility].  I discussed the case with the hospitalist Dr. Kenney who accepted transfer of patient.  We will plan to transfuse 1 unit, lactic acid and procalcitonin added at her request.    Patient is requesting to go by private vehicle and he has done this in the past and on recent transfer 1 week ago.  I recommend against this but cannot force him to take an ambulance.  I said that timing of blood products could pose a problem for this plan.    I have reviewed the nursing notes.         New Prescriptions    No medications on file       Final diagnoses:   Gastrointestinal hemorrhage, unspecified gastrointestinal hemorrhage type   S/P ERCP   S/P laparoscopic cholecystectomy     Juan Camacho MD    12/26/2022   [facility] EMERGENCY DEPT    Disclaimer: This note consists of words and symbols derived from keyboarding and dictation using voice recognition software.  As a result, there may be errors that have gone undetected.  Please consider this when interpreting information found in this note.             Juan Camacho MD  12/26/22  [id number]

## 2022-12-26 NOTE — ED NOTES
Discharged yesterday for ivan mullins, was doing well until about 0200 this am & pain started to left lower belly,feels bloated to that side, nauseated without vomiting, sites look good with some surrounding bruising. Had small BM since surgery, denies fevers or chills. Did state he took hussein ASA back & body & last norco was around 0300, was helping incisional pain but not helping new pain.

## 2022-12-26 NOTE — ED TRIAGE NOTES
Hernia repair on Saturday, at [place], increased abd pain today see triage note       Triage Assessment     Row Name 12/26/22 1207       Triage Assessment (Adult)    Airway WDL WDL       Respiratory WDL    Respiratory WDL WDL       Cognitive/Neuro/Behavioral WDL    Cognitive/Neuro/Behavioral WDL WDL

## 2022-12-26 NOTE — PROGRESS NOTES
[facility]    Background: Transitional Care Management program identified per system criteria and reviewed by [facility] Resource [place] team for possible outreach.    Assessment: Upon chart review, [facility] Team member will not proceed with patient outreach related to this episode of Transitional Care Management program due to reason below:    Patient has presented to Emergency Department, been readmitted to hospital, or transferred to another hospital.    Plan: Transitional Care Management episode addressed appropriately per reason noted above.      Jena Ibrahim MA  [facility]    *[facility]cted Care Resource Team does NOT follow patient ongoing. Referrals are identified based on internal discharge reports and the outreach is to ensure patient has an understanding of their discharge instructions.

## 2022-12-26 NOTE — TELEPHONE ENCOUNTER
I called to speak with Ifrah. He reports worsening left sided abdominal pain that has been worsening since last night. He sounds very weak on the phone. He currently reports his pain an 8/10. Pt states he was feeling great in the hospital after surgery. He reports taking #3 tablets of Hydrocodone with no pain relief. He admits nausea but no vomiting. Wife has been taking his temp today several times and it is normal. He had a small bowel movement this morning that was dark colored. I did discuss that this is common after an ERCP procedure but typically not worrisome. Due to pt's increasing pain, I have recommended he be seen at the ED. He states his wife will take him to the ED right now.

## 2022-12-26 NOTE — TELEPHONE ENCOUNTER
Patient had surgery Saturday and he is having severe belly pain and that was one of the signs to watch for.  His stools are also black.    Please call and advise.    Thanks!

## 2022-12-27 NOTE — PROGRESS NOTES
Assessment and Plan  Principal Problem:    Sepsis (H)  Active Problems:    Hypopituitarism (H)    Central hypothyroidism    Spindle cell sarcoma (H)    Mesothelioma, malignant (H)    Secondary adrenal insufficiency (H)    Elevated LFTs    Postoperative anemia due to acute blood loss    Stage 3a chronic kidney disease (H)    Ifrah Huitron is a 72 year old male presenting with metastatic spindle cell sarcoma, malignant mesothelioma, mediastinal lymphadenopathy, left vocal fold paralysis, central hypothyroidism, GERD, kidney stones, and DJD who was admitted on 12/22/2022 as a transfer from [facility]. He was found to have fever, weakness, and an episode of presyncope 1 day prior to arrival.  He was seen by his infusion center and noted to have elevated LFTs and told to come to the ED, discharge 12/25 after ERCP with sphincterotomy and Lap carmelita.      ABL anemia with melena/Abd pain  - Hgb 6.4 given 1 unit started at [facility]  - recheck 8.2  - consented   - recheck Hgb in am  - NPO   - S/P  ERCP on 12/23 Choledocholithiasis with an obstruction was found.Complete removal was accomplished by biliary  sphincterotomy, dilation and balloon extraction. Indocin pr.   - ERCP viewed esophagus, stomach and Upper GI normal, last colonoscopy in 1220 unremarkable.   - S/P Lap carmelita 12/24  - Surgery and GI consulted   - CT  Hypoenhancing splenic mass has not significantly changed, measuring 9.1 cm, and is again noted to abut the left hemidiaphragm and upper left kidney. Interval postoperative changes of laparoscopic cholecystectomy are  noted. Free intraperitoneal air is also likely postoperative. Pneumobilia is new since the previous exam, and likely related to a recent ERCP. Small amount of nonspecific free fluid in the pelvis is new since the previous exam. Mild luminal narrowing in the proximal SMA is unchanged, and is of uncertain clinical significance.  - Upper endo negative for bleeding from billiary tract  -  Surgery feels surgical blood loss unlikely  - IV dilaudid for pain control  - stop NSAIDs will need alternative pain control plan.    Left flank pain  - this is new, CT reviewed, will check US Renal to assess for hydronephrosis due to splenic mass.  - start oxycodone for cancer pain, try to wean IV dilaudid as tolerated.    Sepsis   - Leukocytosis   - blood cx - NGTD, urine not C/W infection.  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, follows outpatient with the [facility] center in [facility]  - per chart chemotherapy has been on hold for the past month due to associated toxicities  -Mediastinal mass, known lung metastases, splenic mass, liver metastases  -CT 12/22: Several new pulmonary nodules, slightly larger mass involving the spleen, stable anterior mediastinal mass, repeat CT 12/26 shows same mass no change     elevated LFTs stable no real change from previous   - trend labs   - GI and surgery tagged     Essential hypertension  - NPO   - pain control and Bps still elevated  - Prn IV hydralazine   - restart home amlodipine  - Stop IVF     Hypopituitarism  Hypothyroidism  - holding cortef and ordered hydrocortisone 10mg BID for now IV concern for sepsis   - continue levothyroxine      Depression  - hold lexapro    CKD 3 stable  - IVF  - trend labs     COVID-19 PCR Results    COVID-19 PCR Results 10/26/21 12/1/21 1/3/22 1/25/22 2/2/22 3/7/22 9/18/22 11/13/22 11/25/22 12/22/22   COVID-19 Virus PCR to [facility] - Result             COVID-19 Virus PCR to [facility] - Source             SARS-CoV-2 Virus Specimen Source             SARS-CoV-2 PCR Result             SARS CoV2 PCR Negative Negative Negative Negative Negative Negative Negative Negative Negative Negative      Comments are available for some flowsheets but are not being displayed.         COVID-19 Antibody Results, Testing for Immunity     COVID-19 Antibody Results, Testing for Immunity   No data to display.           VTE prophylaxis:  Pneumatic Compression Devices  DIET: Orders Placed This Encounter      Advance Diet as Tolerated: Clear Liquid Diet    Drains/Lines: port accessed and can draw labs   Weight bearing status: WBAt  Disposition/Barriers to discharge: pending improvement and no further blood loss, plus specialist recs  Code Status:Full Code    CC: Patient continues to have left flank pain.  Reviewed images with he and his wife.  This pain is new.  Requires dilaudid IV for control.     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     Thyroid disease     removed pituitary gland     Past Surgical History:   Procedure Laterality Date     COLONOSCOPY N/A 12/17/2020    Procedure: COLONOSCOPY;  Surgeon: Ken Camacho MD;  Location: [facility]     ENDOSCOPIC RETROGRADE CHOLANGIOPANCREATOGRAM N/A 12/23/2022    Procedure: ENDOSCOPIC RETROGRADE CHOLANGIOPANCREATOGRAPHY;  Surgeon: Jim Lamar MD;  Location: [facility] [place] [facility]     ENT SURGERY       HERNIA REPAIR       INSERT PORT VASCULAR ACCESS Right 1/28/2022    Procedure: INSERTION, VASCULAR ACCESS PORT;  Surgeon: Daniel Kinney MD;  [place] [facility] [place]     IR CHEST PORT PLACEMENT > 5 YRS OF AGE  1/28/2022     LAPAROSCOPIC CHOLECYSTECTOMY N/A 12/24/2022    Procedure: CHOLECYSTECTOMY, LAPAROSCOPIC;  Surgeon: Froy More DO;  Location: [facility] OR     LARYNGOSCOPY, EXCISE VOCAL CORD LESION MICROSCOPIC, COMBINED Left 07/01/2021    Procedure: MICROLARYNGOSCOPY, LEFT TRUE VOCAL CORD INJECTION WITH PROLARYN;  Surgeon: Kyree Bearden MD;  Location: [facility]     PHACOEMULSIFICATION WITH STANDARD INTRAOCULAR LENS IMPLANT Right 03/10/2021    Procedure: Cataract removal with implant.;  Surgeon: Jamir Mac MD;  Location: [facility]     PHACOEMULSIFICATION WITH STANDARD INTRAOCULAR LENS IMPLANT  Left 04/05/2021    Procedure: Cataract removal with implant.;  Surgeon: Jamir Mac MD;  Location: [facility]     PICC DOUBLE LUMEN PLACEMENT Right 12/01/2021    5FR DL PICC, basilic vein. L-38cm, 1cm out.     PICC DOUBLE LUMEN PLACEMENT Right 01/04/2022    Right cephalic, 41 cm, 1 external length     PITUITARY EXCISION       tooth pulled 4/7  Right      Family History   Problem Relation Age of Onset     Lupus Mother      ALS Father      Rheumatoid Arthritis Sister      Social History     Socioeconomic History     Marital status:      Spouse name: Not on file     Number of children: Not on file     Years of education: Not on file     Highest education level: Not on file   Occupational History     Not on file   Tobacco Use     Smoking status: Never     Smokeless tobacco: Never   Substance and Sexual Activity     Alcohol use: Yes     Comment: rare     Drug use: Never     Sexual activity: Not on file   Other Topics Concern     Parent/sibling w/ CABG, MI or angioplasty before 65F 55M? Not Asked   Social History Narrative     Not on file     Social Determinants of Health     Financial Resource Strain: Low Risk      Difficulty of Paying Living Expenses: Not very hard   Food Insecurity: No Food Insecurity     Worried About Running Out of Food in the Last Year: Never true     Ran Out of Food in the Last Year: Never true   Transportation Needs: No Transportation Needs     Lack of Transportation (Medical): No     Lack of Transportation (Non-Medical): No   Physical Activity: Not on file   Stress: Not on file   Social Connections: Not on file   Intimate Partner Violence: Unknown     Fear of Current or Ex-Partner: No     Emotionally Abused: No     Physically Abused: Not on file     Sexually Abused: No   Housing Stability: Not on file     Allergies   Allergen Reactions     Penicillins Hives and Swelling     Occurred as small child   Pt tolerated meropenem 12/23/22         PRIOR TO ADMISSION MEDICATIONS   Prior to  "Admission medications    Medication Sig Last Dose Taking? Auth Provider Long Term End Date   acetaminophen (TYLENOL) 325 MG tablet Take 2 tablets (650 mg) by mouth every 6 hours as needed for mild pain or other (and adjunct with moderate or severe pain or per patient request)   Brook East MD     acetaminophen (TYLENOL) 500 MG tablet Take 1,000 mg by mouth every 8 hours as needed for mild pain   Reported, Patient     amLODIPine (NORVASC) 5 MG tablet Take 1 tablet (5 mg) by mouth daily   Maynor Rios PA Yes    aspirin-acetaminophen-caffeine (EXCEDRIN MIGRAINE) 250-250-65 MG tablet Take 1 tablet by mouth daily as needed for headaches   Reported, Patient     Aspirin-Caffeine (JUAN A BACK & BODY PO) Take 2 tablets by mouth 2 times daily as needed   Reported, Patient     celecoxib (CELEBREX) 200 MG capsule Take 1 capsule (200 mg) by mouth 2 times daily   Maynor Rios PA Yes    doxepin (SINEQUAN) 10 MG/ML (HIGH CONC) solution Take 2.5 mLs (25 mg) by mouth 2 times daily as needed (rinse for mucositis) Dilute the 2.5 mL of doxepin to 5 ml total in sterile or distilled water.   Maynor Rios PA Yes    escitalopram (LEXAPRO) 10 MG tablet Take 1 tablet (10 mg) by mouth daily   Maynor Rios PA Yes    guaiFENesin-codeine (GUAIFENESIN AC) 100-10 MG/5ML syrup Take 10 mLs by mouth every 4 hours as needed for cough   Maynor Rios PA     HYDROcodone-acetaminophen (NORCO) 5-325 MG tablet Take 1 tablet by mouth every 6 hours as needed for moderate pain (4-6)   Day, Carlos PIERRE PA-C     hydrocortisone (CORTEF) 10 MG tablet Take 20 mg in the morning and 10 mg in the afternoon   Jamir Grant MD Yes    Insulin Syringe-Needle U-100 27.5G X 5/8\" 2 ML MISC 1 each daily as needed (with solucortef for adrenal crisis)   Jamir Grant MD     levothyroxine (SYNTHROID/LEVOTHROID) 75 MCG tablet Take 1 tablet (75 mcg) by mouth every morning   Jamir Grant MD Yes  "   Menthol-Methyl Salicylate (DALIA MALIK GREASELESS) cream Apply topically every 6 hours as needed   Unknown, Entered By History     nystatin (MYCOSTATIN) 106335 UNIT/ML suspension Swish and spit 500,000 Units in mouth 4 times daily   Unknown, Entered By History     omeprazole (PRILOSEC) 20 MG DR capsule Take 40 mg by mouth daily   Maynor Rios PA     ondansetron (ZOFRAN) 4 MG tablet Take 1-2 tablets (4-8 mg) by mouth every 8 hours as needed for nausea   Maynor Rios PA     phosphorus tablet 250 mg (PHOSPHA 250 NEUTRAL) 250 MG per tablet Take 1 tablet (250 mg) by mouth 2 times daily  Patient taking differently: Take 250 mg by mouth daily   Primo Vora MD     potassium chloride ER (KLOR-CON M) 20 MEQ CR tablet Take 1 tablet (20 mEq) by mouth daily  Patient taking differently: Take 20 mEq by mouth 2 times daily   Primo Vora MD     prochlorperazine (COMPAZINE) 5 MG tablet Take 1 tablet (5 mg) by mouth every 6 hours as needed for nausea or vomiting . Caution: causes sedation.   Maynor Rios PA     senna-docusate (SENOKOT-S/PERICOLACE) 8.6-50 MG tablet Take 1 tablet by mouth 2 times daily as needed for constipation   Brook East MD     SUMAtriptan (IMITREX) 50 MG tablet Take 1 tablet (50 mg) by mouth at onset of headache for migraine May repeat in 2 hours. Max 4 tablets/24 hours.   Maynor Rios PA     triamcinolone (KENALOG) 0.1 % external cream Apply topically 2 times daily to bothersome skin areas.  Patient taking differently: Apply topically 2 times daily as needed to bothersome skin areas.   Maynor Rios PA          REVIEW OF SYSTEMS:  12 point reviewed pertinent negatives and positives in HPI all others negative     PHYSICAL EXAM  Temp:  [97.4  F (36.3  C)-98.6  F (37  C)] 97.8  F (36.6  C)  Pulse:  [] 96  Resp:  [8-16] 16  BP: (137-185)/() 167/74  SpO2:  [93 %-100 %] 100 %  Wt Readings from Last 1 Encounters:   12/27/22 65 kg (143 lb 6.4 oz)      Body mass index is 22.46 kg/m .  Physical Exam  Constitutional:       Appearance: He is ill-appearing.      Comments: Uncomfortable but nontoxic, NAD   HENT:      Head: Normocephalic and atraumatic.      Nose: Nose normal.      Mouth/Throat:      Mouth: Mucous membranes are moist.      Pharynx: Oropharynx is clear.   Eyes:      Extraocular Movements: Extraocular movements intact.      Pupils: Pupils are equal, round, and reactive to light.      Comments: Pale conjunctiva   Cardiovascular:      Rate and Rhythm: Normal rate and regular rhythm.      Pulses: Normal pulses.   Pulmonary:      Effort: Pulmonary effort is normal. No respiratory distress.      Breath sounds: Normal breath sounds. No wheezing, rhonchi or rales.   Abdominal:      General: Bowel sounds are normal. There is no distension.      Palpations: Abdomen is soft. There is no mass.      Tenderness: There is no guarding.      Comments: LLQ tenderness with palpation no masses  No rebound or rigidity   Musculoskeletal:         General: Normal range of motion.      Cervical back: Normal range of motion and neck supple.      Comments: BLE ankle edema   Skin:     General: Skin is warm and dry.      Capillary Refill: Capillary refill takes less than 2 seconds.      Comments: Bruising on arms    Neurological:      General: No focal deficit present.      Mental Status: He is alert and oriented to person, place, and time. Mental status is at baseline.      Cranial Nerves: No cranial nerve deficit.      Sensory: No sensory deficit.         PERTINENT LABS and RADIOLOGY   No results found for this visit on 12/26/22.    Recent Labs   Lab 12/27/22  0602 12/26/22  2127 12/26/22  1614 12/26/22  1338 12/25/22  0632 12/25/22  0225 12/25/22  0030 12/24/22  2137 12/24/22  1202 12/23/22  1822 12/23/22  0545   WBC 24.2*  --   --  18.5*  --   --   --   --  11.7*  --  6.7   HGB 8.2* 8.2* 6.4* 6.7*  --   --   --   --  8.7*  --  8.2*   MCV 96  --   --  97  --   --   --   --   95  --  97   *  --   --  141*  --   --   --   --  152  --  130*     --   --  141  --   --   --   --   --   --  137   POTASSIUM 3.8  3.8  --   --  3.4  --   --   --   --   --   --  4.3   CHLORIDE 106  --   --  106  --   --   --   --   --   --  103   CO2 26  --   --  26  --   --   --   --   --   --  23   BUN 35.6*  --   --  44.8*  --   --   --   --   --   --  33.6*   CR 1.16  --   --  1.29*  --   --  1.29*  --   --   --  1.50*   ANIONGAP 8  --   --  9  --   --   --   --   --   --  11   COTY 8.7*  --   --  8.9  --   --   --   --   --   --  8.8   GLC 97  --   --  135* 136*   < >  --    < >  --    < > 135*   ALBUMIN 3.2*  --   --  3.2*  --   --   --   --  3.4*  --  3.2*   PROTTOTAL 5.4*  --   --  5.4*  --   --   --   --  5.8*  --  5.6*   BILITOTAL 0.4  --   --  0.3  --   --   --   --  0.3  --  0.3   ALKPHOS 427*  --   --  497*  --   --   --   --  456*  --  527*   *  --   --  133*  --   --   --   --  109*  --  148*   AST 42  --   --  59*  --   --   --   --  39  --  73*   LIPASE  --  24  --   --   --   --   --   --   --   --   --     < > = values in this interval not displayed.     No results found for this or any previous visit (from the past 24 hour(s)).

## 2022-12-27 NOTE — PLAN OF CARE
Problem: Gastrointestinal Bleeding  Goal: Optimal Coping with Acute Illness  12/26/2022 2248 by Tabatha Akers, RN  Outcome: Not Progressing  12/26/2022 2241 by Tabatha Akers RN  Outcome: Not Progressing  Goal: Hemostasis  12/26/2022 2248 by Tabatha Akers RN  Outcome: Not Progressing  12/26/2022 2241 by Tabatha Akers RN  Outcome: Not Progressing     Problem: Pain Acute  Goal: Optimal Pain Control and Function  Outcome: Not Progressing     Problem: Plan of Care - These are the overarching goals to be used throughout the patient stay.    Goal: Absence of Hospital-Acquired Illness or Injury  Intervention: Identify and Manage Fall Risk  Recent Flowsheet Documentation  Taken 12/26/2022 2009 by Tabatha Akers RN  Safety Promotion/Fall Prevention:   nonskid shoes/slippers when out of bed   activity supervised  Intervention: Prevent Skin Injury  Recent Flowsheet Documentation  Taken 12/26/2022 2009 by Tabatha Akers RN  Body Position: position changed independently   Goal Outcome Evaluation:       Pt arrived via stretcher from [place] [facility] with blood running, EMS cleared pump, so volume infused from our pump was 172ml.  Since unit of PRBCs was started in another facility, we are unable to complete unit.  Dr. Toledo saw pt ordered IVP pain medication since pt's pain was 8/10, pain re-assessment pt states pain is a 3/10.  Pt was started on Mag and K protocol.  Currently on K replacement.                    Emergency Department Nursing Plan of Care       The Nursing Plan of Care is developed from the Nursing assessment and Emergency Department Attending provider initial evaluation. The plan of care may be reviewed in the ED Provider note.     The Plan of Care was developed with the following considerations:   Patient / Family readiness to learn indicated by:verbalized understanding  Persons(s) to be included in education: patient  Barriers to Learning/Limitations:No    Signed     July Caballero RN    6/9/2017   4:31 PM

## 2022-12-27 NOTE — PLAN OF CARE
Shift from 0700 to 1930-      Problem: Gastrointestinal Bleeding  Goal: Optimal Coping with Acute Illness  Outcome: Progressing  Goal: Hemostasis  Outcome: Progressing     Problem: Pain Acute  Goal: Optimal Pain Control and Function  Outcome: Progressing  Intervention: Develop Pain Management Plan  Recent Flowsheet Documentation  Taken 12/27/2022 1019 by Astrid Kraft, RN  Pain Management Interventions:    medication (see MAR)    emotional support  Taken 12/27/2022 0735 by Astrid Kraft, RN  Pain Management Interventions:    medication (see MAR)    emotional support       Goal Outcome Evaluation:    Patient having LLQ pain and pain thoughout mid and lower back. Given IV dilaudid twice. Pain decreased. Started on oral oxycodone 10mg prn. Trying to use first.    NPO since midnight for EGD today. IVF infusing. Continued on IV antibiotics.   Afterwards started on clears. Stay on clears until tomorrow am.    Up with SBA. Wife at beside and updated by MD.    Renal USG ordered and done for back pain. History also of cancer.

## 2022-12-27 NOTE — PROGRESS NOTES
"[facility] Progress Note     IMPRESSION:  72 year old male with history of metastatic spindle cell sarcoma, malignant mesothelioma, mediastinal lymphadenopathy, left vocal fold paralysis, central hypothyroidism, GERD, kidney stones, and DJD who was readmitted on 12/26/22 for left-sided abdominal pains and acute drop in Hgb from 8.7 -> 6.7. He was previously admitted from 12/22 - 12/25/22 for elevated LFT's, choledocholithiasis s/p ERCP and lap carmelita.     -MRCP 12/22/22- Mild biliary ductal dilatation. Small region of low signal at the distal common bile duct could represent gallbladder sludge extending to this region. There is gallbladder sludge noted within the gallbladder lumen as well. The common bile duct measures 9 mm.  Incidental findings of a few tiny cystic lesions at the pancreas suggesting small cystic neoplasms.   -ERCP 12/23/22 - Choledocholithiasis removed and sphincterotomy.   - Lap carmelita 12/24/22    1. Anemia  - with reported melena (last episode this morning). Hgb decline from 8.7 -> 6.7. Now back to 8.2 after 1 unit PRBC. He is NOT on anticoagulation. INR 1.12. No NSAIDs.   - Concerns for possible post ERCP/sphincterotomy bleed. Previous ERCP noted \"upper GI tract grossly normal\".    - CT abd/pevlis 12/26/22 with pneumobilia (likely from recent ERCP) and free intraperitoneal air (likely from lap carmelita). New nonspecific, small volume free fluid in pelvis.    2. Elevated LFT's.   - Alk phos 427, ALT//42, T bili 0.4. Lipase normal.   - Transaminases trending down following ERCP but alk phos remains elevated.     3. Metastatic spindle cell sarcoma, diagnosed March 2021  - Chemotherapy on hold for past month  - known mediastinal mass, known lung metastases, splenic mass, liver metastases    RECOMMENDATIONS:  - NPO  - Plan for endoscopic evaluation with side-viewer scope to assess for post-ERCP bleed. Tentatively scheduled for Noon today.   - Monitor Hgb and transfuse as necessary.   " "    Approximately 25 minutes of total time was spent providing patient care, including patient evaluation, reviewing documentation/test results, and     Fawad Cook PA-C  [facility]  [phone]  ________________________________________________________________________      SUBJECTIVE:    Pt reports increased abdominal pains in the left abdomen shortly after discharge. Pains are rated 9/10 and would persist throughout the night. Pain medications here have been helpful in managing symptoms. No fevers or chills. Mild nausea.     He developed black stools since  but got worse on  with diarrhea and \"very black stools\". He again reported black stools this morning but to a lesser extent compared to yesterday.      OBJECTIVE:  BP (!) 180/84 (BP Location: Right arm)   Pulse 84   Temp 97.9  F (36.6  C) (Oral)   Resp 16   SpO2 97%   Temp (24hrs), Av.8  F (36.6  C), Min:97.4  F (36.3  C), Max:98  F (36.7  C)    No data found.    Intake/Output Summary (Last 24 hours) at 2022 0913  Last data filed at 2022 2236  Gross per 24 hour   Intake 111 ml   Output --   Net 111 ml        PHYSICAL EXAM  GEN: Alert, oriented x3, communicative and in NAD.    LYMPH: No LAD noted.  HRT: RRR  LUNGS: CTA  ABD: ND, +BS, + pain to palpation worse in the LLQ but felt diffusely, no rebound, no HSM.  SKIN: No rash, jaundice or spider angiomata      LABS:  I have reviewed the patient's new clinical lab results:     Recent Labs   Lab Test 22  0602 22  2127 22  1614 22  1338 22  1202 22  1937 22  1606 21  1746 21  1730 21  1410 21  1216   WBC 24.2*  --   --  18.5* 11.7*   < > 12.2*   < >  --    < > 10.5   HGB 8.2* 8.2* 6.4* 6.7* 8.7*   < > 10.2*   < >  --    < > 15.1   MCV 96  --   --  97 95   < > 92   < >  --    < > 89   *  --   --  141* 152   < > 317   < >  --    < > 260   INR  --   --   --   --   --   --  1.12  --  1.14  --  " 1.13    < > = values in this interval not displayed.     Recent Labs   Lab Test 12/27/22  0602 12/26/22  1338 12/25/22  0030 12/23/22  0545   POTASSIUM 3.8  3.8 3.4  --  4.3   CHLORIDE 106 106  --  103   CO2 26 26 -- 23   BUN 35.6* 44.8*  --  33.6*   CR 1.16 1.29* 1.29* 1.50*   ANIONGAP 8 9  --  11     Recent Labs   Lab Test 12/27/22  0602 12/26/22  2127 12/26/22  1519 12/26/22  1338 12/24/22  1202 12/23/22  0545 12/22/22  1126 11/26/22  0555 11/25/22  1714 09/18/22  1716 09/18/22  1508 06/21/21  1410 03/29/21  1022   ALBUMIN 3.2*  --   --  3.2* 3.4*   < >  --    < >  --    < > 3.0*   < > 3.3*   BILITOTAL 0.4  --   --  0.3 0.3   < >  --    < >  --    < > 1.4*   < > 0.4   *  --   --  133* 109*   < >  --    < >  --    < > 30   < > 19   AST 42  --   --  59* 39   < >  --    < >  --    < > 24   < > 17   PROTEIN  --   --  30*  --   --   --  30*  --  50*   < >  --    < >  --    LIPASE  --  24  --   --   --   --   --   --   --   --  10*  --  79    < > = values in this interval not displayed.         IMAGING:  CT ABDOMEN AND PELVIS WITH CONTRAST 12/26/2022 3:18 PM     CLINICAL HISTORY: Left upper quadrant pain. Recent laparoscopic  cholecystectomy. History of metastatic spindle cell sarcoma and  malignant mesothelioma.  TECHNIQUE: CT scan of the abdomen and pelvis was performed following  injection of IV contrast. Multiplanar reformats were obtained. Dose  reduction techniques were used.  CONTRAST: 61mL of Isovue 370  COMPARISON: CT of the chest, abdomen, and pelvis performed 12/22/2022.     FINDINGS:   LOWER CHEST: The visualized lung bases are clear. Small hiatal hernia.     HEPATOBILIARY: Several small indeterminate hypodensities in the liver  are too small to characterize, but are unchanged. Interval  postoperative changes of cholecystectomy. Pneumobilia is new since the  previous exam, and may be related to a recent ERCP.     PANCREAS: Normal.     SPLEEN: Hypoenhancing mass within the spleen superiorly  and  posteriorly has not significantly changed, measured at 9.1 x 8.7 cm.  This mass again abuts the left hemidiaphragm posteriorly as well as  the upper pole of the left kidney.     ADRENAL GLANDS: Normal.     KIDNEYS/BLADDER: Several bilateral renal cysts are unchanged, and  would require no specific follow-up. The kidneys are otherwise  unremarkable. No hydronephrosis.     BOWEL: No bowel obstruction. Scattered sigmoid diverticulosis. No  convincing evidence for colitis or diverticulitis. Unremarkable  appendix.     PELVIC ORGANS: Mild prostatic enlargement. Small amount of nonspecific  free fluid in the pelvis, new since the previous exam.     LYMPH NODES: No enlarged lymph nodes are identified in the abdomen or  pelvis.     VASCULATURE: Mild luminal narrowing in the proximal SMA is not  significantly changed (series 4 image 42).     ADDITIONAL FINDINGS: Free intraperitoneal air anteriorly is likely  related to the recent laparoscopic cholecystectomy.     MUSCULOSKELETAL: No destructive bony lesions are identified.                                                                      IMPRESSION:   1.  Hypoenhancing splenic mass has not significantly changed,  measuring 9.1 cm, and is again noted to abut the left hemidiaphragm  and upper left kidney.  2.  Interval postoperative changes of laparoscopic cholecystectomy are  noted. Free intraperitoneal air is also likely postoperative.  3.  Pneumobilia is new since the previous exam, and likely related to  a recent ERCP.  4.  Small amount of nonspecific free fluid in the pelvis is new since  the previous exam.  5.  Mild luminal narrowing in the proximal SMA is unchanged, and is of  uncertain clinical significance.     The patient was seen and evaluated in conjunction with the advanced practice provider. Please see their note for details. History of metastatic spindle cell carcinoma, recent ERCP for choledocholithiasis, recent cholecystectomy presented with new,  severe left flank pain. This has improved and is of unclear cause by imaging. Melena noted on presentation and had drop in hgb as above. Patient underwent EGD today and no bleeding noted from sphincterotomy site.    Patient is A&O, appears chronically ill. Vitals stable. Left flank pain improved. Abd exam rather benign.     Hgb was 6.4, up to 8.2 now. LFTs trending down, bilirubin normal. Lipase normal.     Impression:  Left flank pain, unclear cause by CT/labs. No evidence for pancreatitis after ERCP. Improved.     Melena, may have had self-limited post-sphincterotomy bleed vs less likely another more distal source. The latter seems highly coincidental.     Plan:  Okay with liquid diet tonight.  If hgb stable and no further melena, advance diet tomorrow.  GI team to follow    Total time spent providing patient care: 20 min with at least 50% spent in reviewing documentation/test results, decision making, and coordination of care.     Jennifer Parekh MD  12/27/20224:34 PM  [facility]

## 2022-12-27 NOTE — PROCEDURES
EGD     Food in the stomach.   No fresh or old blood.   No evidence of bleeding from the sphincterotomy site.     Brenton Francois MD

## 2022-12-27 NOTE — CONSULTS
General Surgery Consultation  Ifrah Huitron MRN# [id number]   Age/Sex: 72 year old male YOB: 1950     Reason for consult: No diagnosis found.        Requesting physician: Dr. Toledo                   Assessment and Plan:   Assessment:  Anemia  S/p lap carmelita on 12/24    Plan:  Reviewed exam, lab, and imaging findings with them. Findings consistent with anemia. CT was reviewed and no signs of surgical complications, anemia does not appear to be surgical related. GI has been consulted, appreciate their input. From our standpoint, he can ADAT. No further surgical interventions indicated, we will sign off at this time.        Christiano Crane PA-C  [facility]  Surgery [address]  [address]  [place] [address]?  Office: [phone]             Chief Complaint:   No chief complaint on file.       History is obtained from the patient    HPI:   Ifrah Huitron is a 72 year old male who was recently admitted and underwent lap carmelita on 12/24 with Dr. More. He was discharged on 12/25. He presented to the ED yesterday due to LUQ pain. He was found to have a drop in his Hgb, transfusion was done. CT was performed and did not show any concerns for post op complications.           Past Medical History:     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     Thyroid disease     removed pituitary gland              Past Surgical History:     Past Surgical History:   Procedure Laterality Date     COLONOSCOPY N/A 12/17/2020    Procedure: COLONOSCOPY;  Surgeon: Ken Camacho MD;  Location: [facility]     ENDOSCOPIC RETROGRADE CHOLANGIOPANCREATOGRAM N/A 12/23/2022    Procedure: ENDOSCOPIC RETROGRADE CHOLANGIOPANCREATOGRAPHY;  Surgeon: Jim Lamar MD;  Location: [facility] OR     ENT SURGERY       HERNIA REPAIR       INSERT PORT VASCULAR ACCESS Right 1/28/2022    Procedure:  INSERTION, VASCULAR ACCESS PORT;  Surgeon: Daniel Kinney MD;  Location: [facility] OR     IR CHEST PORT PLACEMENT > 5 YRS OF AGE  1/28/2022     LAPAROSCOPIC CHOLECYSTECTOMY N/A 12/24/2022    Procedure: CHOLECYSTECTOMY, LAPAROSCOPIC;  Surgeon: Froy More DO;  [facility] [facility]     LARYNGOSCOPY, EXCISE VOCAL CORD LESION MICROSCOPIC, COMBINED Left 07/01/2021    Procedure: MICROLARYNGOSCOPY, LEFT TRUE VOCAL CORD INJECTION WITH PROLARYN;  Surgeon: Kyree Bearden MD;  Location: [facility] OR     PHACOEMULSIFICATION WITH STANDARD INTRAOCULAR LENS IMPLANT Right 03/10/2021    Procedure: Cataract removal with implant.;  Surgeon: Jamir Mac MD;  Location: [facility] OR     PHACOEMULSIFICATION WITH STANDARD INTRAOCULAR LENS IMPLANT Left 04/05/2021    Procedure: Cataract removal with implant.;  Surgeon: Jamir Mac MD;  Location: [facility] OR     PICC DOUBLE LUMEN PLACEMENT Right 12/01/2021    5FR DL PICC, basilic vein. L-38cm, 1cm out.     PICC DOUBLE LUMEN PLACEMENT Right 01/04/2022    Right cephalic, 41 cm, 1 external length     PITUITARY EXCISION       tooth pulled 4/7  Right              Social History:    reports that he has never smoked. He has never used smokeless tobacco. He reports current alcohol use. He reports that he does not use drugs.           Family History:     Family History   Problem Relation Age of Onset     Lupus Mother      ALS Father      Rheumatoid Arthritis Sister               Allergies:     Allergies   Allergen Reactions     Penicillins Hives and Swelling     Occurred as small child   Pt tolerated meropenem 12/23/22                Medications:     Prior to Admission medications    Medication Sig Start Date End Date Taking? Authorizing Provider   acetaminophen (TYLENOL) 325 MG tablet Take 2 tablets (650 mg) by mouth every 6 hours as needed for mild pain or other (and adjunct with moderate or severe pain or per patient request) 12/25/22  Yes Brook East MD   acetaminophen  (TYLENOL) 500 MG tablet Take 1,000 mg by mouth every 8 hours as needed for mild pain   Yes Reported, Patient   amLODIPine (NORVASC) 5 MG tablet Take 1 tablet (5 mg) by mouth daily 12/2/22  Yes Maynor Rios PA   aspirin-acetaminophen-caffeine (EXCEDRIN MIGRAINE) 250-250-65 MG tablet Take 1 tablet by mouth daily as needed for headaches   Yes Reported, Patient   Aspirin-Caffeine (JUAN A BACK & BODY PO) Take 2 tablets by mouth 2 times daily as needed   Yes Reported, Patient   celecoxib (CELEBREX) 200 MG capsule Take 1 capsule (200 mg) by mouth 2 times daily 12/6/22  Yes Maynor Rios PA   doxepin (SINEQUAN) 10 MG/ML (HIGH CONC) solution Take 2.5 mLs (25 mg) by mouth 2 times daily as needed (rinse for mucositis) Dilute the 2.5 mL of doxepin to 5 ml total in sterile or distilled water. 11/22/22  Yes Maynor Rios PA   escitalopram (LEXAPRO) 10 MG tablet Take 1 tablet (10 mg) by mouth daily 8/5/22  Yes Maynor Rios PA   guaiFENesin-codeine (GUAIFENESIN AC) 100-10 MG/5ML syrup Take 10 mLs by mouth every 4 hours as needed for cough 9/28/22  Yes Maynor Rios PA   HYDROcodone-acetaminophen (NORCO) 5-325 MG tablet Take 1 tablet by mouth every 6 hours as needed for moderate pain (4-6) 12/25/22  Yes Carlos Prince PA-C   hydrocortisone (CORTEF) 10 MG tablet Take 20 mg in the morning and 10 mg in the afternoon 1/4/22  Yes Jamir Grant MD   levothyroxine (SYNTHROID/LEVOTHROID) 75 MCG tablet Take 1 tablet (75 mcg) by mouth every morning 1/4/22  Yes Jamir Grant MD   Menthol-Methyl Salicylate (DALIA MALIK GREASELESS) cream Apply topically every 6 hours as needed   Yes Unknown, Entered By History   nystatin (MYCOSTATIN) 904534 UNIT/ML suspension Swish and spit 500,000 Units in mouth 4 times daily   Yes Unknown, Entered By History   omeprazole (PRILOSEC) 20 MG DR capsule Take 40 mg by mouth daily 8/8/22  Yes Maynor Rios PA   ondansetron (ZOFRAN) 4 MG tablet Take 1-2  "tablets (4-8 mg) by mouth every 8 hours as needed for nausea 8/8/22  Yes Maynor Rios PA   phosphorus tablet 250 mg (PHOSPHA 250 NEUTRAL) 250 MG per tablet Take 1 tablet (250 mg) by mouth 2 times daily  Patient taking differently: Take 250 mg by mouth daily 11/29/22  Yes Primo Vora MD   potassium chloride ER (KLOR-CON M) 20 MEQ CR tablet Take 1 tablet (20 mEq) by mouth daily  Patient taking differently: Take 20 mEq by mouth 2 times daily 11/29/22  Yes Primo Vora MD   prochlorperazine (COMPAZINE) 5 MG tablet Take 1 tablet (5 mg) by mouth every 6 hours as needed for nausea or vomiting . Caution: causes sedation. 12/6/22  Yes Maynor Rios PA   senna-docusate (SENOKOT-S/PERICOLACE) 8.6-50 MG tablet Take 1 tablet by mouth 2 times daily as needed for constipation 12/25/22  Yes Brook East MD   SUMAtriptan (IMITREX) 50 MG tablet Take 1 tablet (50 mg) by mouth at onset of headache for migraine May repeat in 2 hours. Max 4 tablets/24 hours. 1/19/22  Yes Maynor Rios PA   triamcinolone (KENALOG) 0.1 % external cream Apply topically 2 times daily to bothersome skin areas.  Patient taking differently: Apply topically 2 times daily as needed to bothersome skin areas. 2/14/22  Yes Maynor Rios PA   Insulin Syringe-Needle U-100 27.5G X 5/8\" 2 ML MISC 1 each daily as needed (with solucortef for adrenal crisis) 11/11/21   Jamir Grant MD              Review of Systems:   The Review of Systems is negative other than noted in the HPI            Physical Exam:     Patient Vitals for the past 24 hrs:   BP Temp Temp src Pulse Resp SpO2   12/27/22 0721 (!) 180/84 97.9  F (36.6  C) Oral 84 16 97 %   12/27/22 0414 (!) 157/80 98  F (36.7  C) Oral 85 16 95 %   12/26/22 2314 (!) 165/80 97.6  F (36.4  C) Oral 81 16 93 %   12/26/22 2208 (!) 177/76 98  F (36.7  C) Oral 79 14 --   12/26/22 2059 (!) 180/98 97.4  F (36.3  C) -- 78 16 --   12/26/22 2009 (!) 176/90 97.5  F (36.4  C) " Oral 79 16 98 %          Intake/Output Summary (Last 24 hours) at 12/27/2022 1020  Last data filed at 12/26/2022 2236  Gross per 24 hour   Intake 111 ml   Output --   Net 111 ml      Constitutional:   awake, alert, cooperative, no apparent distress, and appears stated age       Eyes:   PERRL, conjunctiva/corneas clear, EOM's intact; no scleral edema or icterus noted        ENT:   Normocephalic, without obvious abnormality, atraumatic, Lips, mucosa, and tongue normal        Hematologic / Lymphatic:   No lymphadenopathy       Lungs:   Normal respiratory effort, no accessory muscle use       Cardiovascular:   Regular rate and rhythm       Abdomen:   Soft, nondistended, expected ttp RUQ, LUQ ttp, dressings c/d/i, ecchymosis at surgical sites       Musculoskeletal:   No obvious swelling, bruising or deformity       Skin:   Skin color and texture normal for patient, no rashes or lesions              Data:        Admission on 12/26/2022   Component Date Value     Hemoglobin 12/26/2022 8.2 (L)      Magnesium 12/26/2022 2.4 (H)      Lipase 12/26/2022 24      Potassium 12/27/2022 3.8      Sodium 12/27/2022 140      Potassium 12/27/2022 3.8      Chloride 12/27/2022 106      Carbon Dioxide (CO2) 12/27/2022 26      Anion Gap 12/27/2022 8      Urea Nitrogen 12/27/2022 35.6 (H)      Creatinine 12/27/2022 1.16      Calcium 12/27/2022 8.7 (L)      Glucose 12/27/2022 97      Alkaline Phosphatase 12/27/2022 427 (H)      AST 12/27/2022 42      ALT 12/27/2022 105 (H)      Protein Total 12/27/2022 5.4 (L)      Albumin 12/27/2022 3.2 (L)      Bilirubin Total 12/27/2022 0.4      GFR Estimate 12/27/2022 66      WBC Count 12/27/2022 24.2 (H)      RBC Count 12/27/2022 2.68 (L)      Hemoglobin 12/27/2022 8.2 (L)      Hematocrit 12/27/2022 25.7 (L)      MCV 12/27/2022 96      MCH 12/27/2022 30.6      MCHC 12/27/2022 31.9      RDW 12/27/2022 17.8 (H)      Platelet Count 12/27/2022 131 (L)      Magnesium 12/27/2022 2.4 (H)    Admission on  12/26/2022, Discharged on 12/26/2022   Component Date Value     Sodium 12/26/2022 141      Potassium 12/26/2022 3.4      Chloride 12/26/2022 106      Carbon Dioxide (CO2) 12/26/2022 26      Anion Gap 12/26/2022 9      Urea Nitrogen 12/26/2022 44.8 (H)      Creatinine 12/26/2022 1.29 (H)      Calcium 12/26/2022 8.9      Glucose 12/26/2022 135 (H)      Alkaline Phosphatase 12/26/2022 497 (H)      AST 12/26/2022 59 (H)      ALT 12/26/2022 133 (H)      Protein Total 12/26/2022 5.4 (L)      Albumin 12/26/2022 3.2 (L)      Bilirubin Total 12/26/2022 0.3      GFR Estimate 12/26/2022 58 (L)      Hold Specimen 12/26/2022 JIC      Hold Specimen 12/26/2022 JIC      Hold Specimen 12/26/2022 JIC      Hold Specimen 12/26/2022 JIC      WBC Count 12/26/2022 18.5 (H)      RBC Count 12/26/2022 2.16 (L)      Hemoglobin 12/26/2022 6.7 (LL)      Hematocrit 12/26/2022 21.0 (L)      MCV 12/26/2022 97      MCH 12/26/2022 31.0      MCHC 12/26/2022 31.9      RDW 12/26/2022 18.3 (H)      Platelet Count 12/26/2022 141 (L)      % Neutrophils 12/26/2022 84      % Lymphocytes 12/26/2022 9      % Monocytes 12/26/2022 6      % Eosinophils 12/26/2022 0      % Basophils 12/26/2022 0      % Metamyelocytes 12/26/2022 1      Absolute Neutrophils 12/26/2022 15.5 (H)      Absolute Lymphocytes 12/26/2022 1.7      Absolute Monocytes 12/26/2022 1.1      Absolute Eosinophils 12/26/2022 0.0      Absolute Basophils 12/26/2022 0.0      Absolute Metamyelocytes 12/26/2022 0.2 (H)      RBC Morphology 12/26/2022 Confirmed RBC Indices      Platelet Assessment 12/26/2022 Automated Count Confirmed. Platelet morphology is normal.      Culture 12/26/2022 No growth after 12 hours      Culture 12/26/2022 No growth after 12 hours      Color Urine 12/26/2022 Yellow      Appearance Urine 12/26/2022 Slightly Cloudy (A)      Glucose Urine 12/26/2022 Negative      Bilirubin Urine 12/26/2022 Negative      Ketones Urine 12/26/2022 Negative      Specific Gravity Urine 12/26/2022  1.015      Blood Urine 12/26/2022 Trace (A)      pH Urine 12/26/2022 5.5      Protein Albumin Urine 12/26/2022 30 (A)      Urobilinogen Urine 12/26/2022 Normal      Nitrite Urine 12/26/2022 Negative      Leukocyte Esterase Urine 12/26/2022 Negative      Bacteria Urine 12/26/2022 Few (A)      Mucus Urine 12/26/2022 Present (A)      RBC Urine 12/26/2022 1      WBC Urine 12/26/2022 1      ABO/RH(D) 12/26/2022 O POS      Antibody Screen 12/26/2022 Negative      SPECIMEN EXPIRATION DATE 12/26/2022 [id number]      Hemoglobin 12/26/2022 6.4 (LL)      Occult Blood 12/26/2022 Positive (A)      Procalcitonin 12/26/2022 0.47 (H)      Lactic Acid 12/26/2022 1.4      Blood Component Type 12/26/2022 Red Blood Cells      Product Code 12/26/2022 [id number]      Unit Status 12/26/2022 Transfused      Unit Number 12/26/2022 [id number]      CROSSMATCH 12/26/2022 Compatible      CODING SYSTEM 12/26/2022 [id number]      ISSUE DATE AND TIME 12/26/2022 [id number]      UNIT ABO/RH 12/26/2022 O+      UNIT TYPE ISBT 12/26/2022 5100          All imaging studies reviewed by me.

## 2022-12-27 NOTE — PHARMACY-ADMISSION MEDICATION HISTORY
Pharmacy Note - Admission Medication History    Pertinent Provider Information: Patient has been taking hydrocortisone 40mg in the AM and 20mg in the evening for ~past week.      ______________________________________________________________________    Prior To Admission (PTA) med list completed and updated in EMR.       PTA Med List   Medication Sig Last Dose     acetaminophen (TYLENOL) 325 MG tablet Take 2 tablets (650 mg) by mouth every 6 hours as needed for mild pain or other (and adjunct with moderate or severe pain or per patient request) Unknown     acetaminophen (TYLENOL) 500 MG tablet Take 1,000 mg by mouth every 8 hours as needed for mild pain 12/25/2022     amLODIPine (NORVASC) 5 MG tablet Take 1 tablet (5 mg) by mouth daily 12/26/2022     aspirin-acetaminophen-caffeine (EXCEDRIN MIGRAINE) 250-250-65 MG tablet Take 1 tablet by mouth daily as needed for headaches Unknown     Aspirin-Caffeine (JUAN A BACK & BODY PO) Take 2 tablets by mouth 2 times daily as needed Unknown     celecoxib (CELEBREX) 200 MG capsule Take 1 capsule (200 mg) by mouth 2 times daily 12/26/2022     doxepin (SINEQUAN) 10 MG/ML (HIGH CONC) solution Take 2.5 mLs (25 mg) by mouth 2 times daily as needed (rinse for mucositis) Dilute the 2.5 mL of doxepin to 5 ml total in sterile or distilled water. Past Month     escitalopram (LEXAPRO) 10 MG tablet Take 1 tablet (10 mg) by mouth daily 12/26/2022     guaiFENesin-codeine (GUAIFENESIN AC) 100-10 MG/5ML syrup Take 10 mLs by mouth every 4 hours as needed for cough Unknown     HYDROcodone-acetaminophen (NORCO) 5-325 MG tablet Take 1 tablet by mouth every 6 hours as needed for moderate pain (4-6) 12/26/2022     hydrocortisone (CORTEF) 10 MG tablet Take 20 mg in the morning and 10 mg in the afternoon 12/26/2022     levothyroxine (SYNTHROID/LEVOTHROID) 75 MCG tablet Take 1 tablet (75 mcg) by mouth every morning 12/26/2022     Menthol-Methyl Salicylate (DALIA MALIK GREASELESS) cream Apply topically every  6 hours as needed Unknown     nystatin (MYCOSTATIN) 014252 UNIT/ML suspension Swish and spit 500,000 Units in mouth 4 times daily Past Week     omeprazole (PRILOSEC) 20 MG DR capsule Take 40 mg by mouth daily 12/25/2022     ondansetron (ZOFRAN) 4 MG tablet Take 1-2 tablets (4-8 mg) by mouth every 8 hours as needed for nausea Unknown     phosphorus tablet 250 mg (PHOSPHA 250 NEUTRAL) 250 MG per tablet Take 1 tablet (250 mg) by mouth 2 times daily (Patient taking differently: Take 250 mg by mouth daily) Past Week     potassium chloride ER (KLOR-CON M) 20 MEQ CR tablet Take 1 tablet (20 mEq) by mouth daily (Patient taking differently: Take 20 mEq by mouth 2 times daily) Past Week     prochlorperazine (COMPAZINE) 5 MG tablet Take 1 tablet (5 mg) by mouth every 6 hours as needed for nausea or vomiting . Caution: causes sedation. Unknown     senna-docusate (SENOKOT-S/PERICOLACE) 8.6-50 MG tablet Take 1 tablet by mouth 2 times daily as needed for constipation Past Week     SUMAtriptan (IMITREX) 50 MG tablet Take 1 tablet (50 mg) by mouth at onset of headache for migraine May repeat in 2 hours. Max 4 tablets/24 hours. Unknown     triamcinolone (KENALOG) 0.1 % external cream Apply topically 2 times daily to bothersome skin areas. (Patient taking differently: Apply topically 2 times daily as needed to bothersome skin areas.) Unknown       Information source(s): Patient and CareEverywhere/[facility]  Method of interview communication: in-person    Summary of Changes to PTA Med List  New: none  Discontinued: none  Changed: none    Patient was asked about OTC/herbal products specifically.  PTA med list reflects this.    In the past week, patient estimated taking medication this percent of the time:  greater than 90%.    Allergies were reviewed, assessed, and updated with the patient.      Patient did not bring any medications to the hospital and can't retrieve from home. No multi-dose medications are available for use during  hospital stay.     The information provided in this note is only as accurate as the sources available at the time of the update(s).    Thank you for the opportunity to participate in the care of this patient.    Indigo Richey [facility]  12/26/2022 8:40 PM

## 2022-12-27 NOTE — H&P
GENERAL PRE-PROCEDURE:   Procedure:  EGD with Sideviewer - melena  Date/Time:  12/27/2022 11:47 AM    Verbal consent obtained?: Yes    Written consent obtained?: Yes    Risks and benefits: Risks, benefits and alternatives were discussed    Consent given by:  Patient  Patient states understanding of procedure being performed: Yes    Patient's understanding of procedure matches consent: Yes    Procedure consent matches procedure scheduled: Yes    Expected level of sedation:  Deep  Appropriately NPO:  Yes  ASA Class:  3  Mallampati  :  Grade 2- soft palate, base of uvula, tonsillar pillars, and portion of posterior pharyngeal wall visible  Lungs:  Lungs clear with good breath sounds bilaterally  Heart:  Normal heart sounds and rate and systolic murmur  History & Physical reviewed:  History and physical reviewed and no updates needed  Statement of review:  I have reviewed the lab findings, diagnostic data, medications, and the plan for sedation

## 2022-12-27 NOTE — H&P
Admission History and Physical   Ifrah Huitron, 1950, [id number]  [facility]  Sepsis (H) [A41.9]  PCP:Sukhdev Kohler, [phone]   Admitting provider: Chandni Toledo MD.    Code status:  Full Code          Extended Emergency Contact Information  Primary Emergency Contact: Rachel Huitron  Address: [address] [place] [address] [facility]  Home Phone: [phone]  Mobile Phone: [phone]  Relation: Spouse  Secondary Emergency Contact: Juanita Huitron   [facility]  Home Phone: [phone]  Mobile Phone: [phone]  Relation: Daughter       Assessment and Plan  Principal Problem:    Sepsis (H)  Active Problems:    Hypopituitarism (H)    Central hypothyroidism    Spindle cell sarcoma (H)    Mesothelioma, malignant (H)    Secondary adrenal insufficiency (H)    Elevated LFTs    Postoperative anemia due to acute blood loss    Stage 3a chronic kidney disease (H)    Ifrah Huitron is a 72 year old male presenting with metastatic spindle cell sarcoma, malignant mesothelioma, mediastinal lymphadenopathy, left vocal fold paralysis, central hypothyroidism, GERD, kidney stones, and DJD who was admitted on 12/22/2022 as a transfer [facility]. He was found to have fever, weakness, and an episode of presyncope 1 day prior to arrival.  He was seen by his infusion center and noted to have elevated LFTs and told to come to the ED, discharge 12/25 after ERCP with sphincterotomy and Lap carmelita.      ABL anemia with melena/Abd pain  - Hgb 6.4 given 1 unit started at [facility]  - recheck 8.2  - consented   - recheck Hgb in am  - NPO   - S/P  ERCP on 12/23 Choledocholithiasis with an obstruction was found.Complete removal was accomplished by biliary  sphincterotomy, dilation and balloon extraction. Indocin pr.   - ERCP viewed esophagus, stomach and Upper GI normal, last colonoscopy in 1220 unremarkable.   - S/P Lap carmelita 12/24  - Surgery and GI consulted   - CT   Hypoenhancing splenic mass has not significantly changed, measuring 9.1 cm, and is again noted to abut the left hemidiaphragm and upper left kidney. Interval postoperative changes of laparoscopic cholecystectomy are  noted. Free intraperitoneal air is also likely postoperative. Pneumobilia is new since the previous exam, and likely related to a recent ERCP. Small amount of nonspecific free fluid in the pelvis is new since the previous exam. Mild luminal narrowing in the proximal SMA is unchanged, and is of uncertain clinical significance.  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, follows outpatient with the [facility] in [facility]  - per chart chemotherapy has been on hold for the past month due to associated toxicities  -Mediastinal mass, known lung metastases, splenic mass, liver metastases  -CT 12/22: Several new pulmonary nodules, slightly larger mass involving the spleen, stable anterior mediastinal mass, repeat CT today shows same mass no change     elevated LFTs stable no real change from previous   - trend labs   - GI and surgery tagged     Essential hypertension  - NPO   - pain control and Bps still elevated  - Prn IV hydralazine   - holding home amlodipine      Hypopituitarism  Hypothyroidism  - holding cortef and ordered hydrocortisone 10mg BID for now IV concern for sepsis   - continue levothyroxine      Depression  - hold lexapro    CKD 3 stable  - IVF  - trend labs     COVID-19 PCR Results    COVID-19 PCR Results 10/26/21 12/1/21 1/3/22 1/25/22 2/2/22 3/7/22 9/18/22 11/13/22 11/25/22 12/22/22   COVID-19 Virus PCR to [facility] - Result             COVID-19 Virus PCR to [facility] - Source             SARS-CoV-2 Virus Specimen Source             SARS-CoV-2 PCR Result             SARS CoV2 PCR Negative Negative Negative Negative  Negative Negative Negative Negative Negative Negative      Comments are available for some flowsheets but are not being displayed.         COVID-19 Antibody Results, Testing for Immunity    COVID-19 Antibody Results, Testing for Immunity   No data to display.           VTE prophylaxis:  Pneumatic Compression Devices  DIET: Orders Placed This Encounter      NPO for Medical/Clinical Reasons Except for: Meds    Drains/Lines: port accessed and can draw labs   Weight bearing status: WBAt  Disposition/Barriers to discharge: pending improvement and no further blood loss, plus specialist recs  Code Status:Full Code    HPI: Ifrah Huitron is a 72 year old old male with h/o metastatic spindle cell carcinoma, malignant mesothelioma, mediastinal lymphadenopathy, GERD, renal stones, choledocholithiasis s/p ERCP on 12/23/22, and lap carmelita on 12/24 and was discharged yesterday morning now with left sided abdominal pain which began last night. Associated nausea but no vomiting, weakness, fevers and chills, poor appetite.     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     Thyroid disease     removed pituitary gland     Past Surgical History:   Procedure Laterality Date     COLONOSCOPY N/A 12/17/2020    Procedure: COLONOSCOPY;  Surgeon: Ken Camacho MD;  Location: [facility]     ENDOSCOPIC RETROGRADE CHOLANGIOPANCREATOGRAM N/A 12/23/2022    Procedure: ENDOSCOPIC RETROGRADE CHOLANGIOPANCREATOGRAPHY;  Surgeon: Jim Lamar MD;  Location: [facility] OR     ENT SURGERY       HERNIA REPAIR       INSERT PORT VASCULAR ACCESS Right 1/28/2022    Procedure: INSERTION, VASCULAR ACCESS PORT;  Surgeon: Daniel Kinney MD;  [place] [facility]     IR CHEST PORT PLACEMENT > 5 YRS OF AGE  1/28/2022     LAPAROSCOPIC CHOLECYSTECTOMY N/A 12/24/2022    Procedure: CHOLECYSTECTOMY, LAPAROSCOPIC;  Surgeon: Froy More DO;  Location: [facility]  OR     LARYNGOSCOPY, EXCISE VOCAL CORD LESION MICROSCOPIC, COMBINED Left 07/01/2021    Procedure: MICROLARYNGOSCOPY, LEFT TRUE VOCAL CORD INJECTION WITH PROLARYN;  Surgeon: Kyree Bearden MD;  Location: [facility]     PHACOEMULSIFICATION WITH STANDARD INTRAOCULAR LENS IMPLANT Right 03/10/2021    Procedure: Cataract removal with implant.;  Surgeon: Jamir Mac MD;  Location: [facility]     PHACOEMULSIFICATION WITH STANDARD INTRAOCULAR LENS IMPLANT Left 04/05/2021    Procedure: Cataract removal with implant.;  Surgeon: Jamir Mac MD;  Location: [facility]     PICC DOUBLE LUMEN PLACEMENT Right 12/01/2021    5FR DL PICC, basilic vein. L-38cm, 1cm out.     PICC DOUBLE LUMEN PLACEMENT Right 01/04/2022    Right cephalic, 41 cm, 1 external length     PITUITARY EXCISION       tooth pulled 4/7  Right      Family History   Problem Relation Age of Onset     Lupus Mother      ALS Father      Rheumatoid Arthritis Sister      Social History     Socioeconomic History     Marital status:      Spouse name: Not on file     Number of children: Not on file     Years of education: Not on file     Highest education level: Not on file   Occupational History     Not on file   Tobacco Use     Smoking status: Never     Smokeless tobacco: Never   Substance and Sexual Activity     Alcohol use: Yes     Comment: rare     Drug use: Never     Sexual activity: Not on file   Other Topics Concern     Parent/sibling w/ CABG, MI or angioplasty before 65F 55M? Not Asked   Social History Narrative     Not on file     Social Determinants of Health     Financial Resource Strain: Low Risk      Difficulty of Paying Living Expenses: Not very hard   Food Insecurity: No Food Insecurity     Worried About Running Out of Food in the Last Year: Never true     Ran Out of Food in the Last Year: Never true   Transportation Needs: No Transportation Needs     Lack of Transportation (Medical): No     Lack of Transportation (Non-Medical): No   Physical  Activity: Not on file   Stress: Not on file   Social Connections: Not on file   Intimate Partner Violence: Unknown     Fear of Current or Ex-Partner: No     Emotionally Abused: No     Physically Abused: Not on file     Sexually Abused: No   Housing Stability: Not on file     Allergies   Allergen Reactions     Penicillins Hives and Swelling     Occurred as small child   Pt tolerated meropenem 12/23/22         PRIOR TO ADMISSION MEDICATIONS   Prior to Admission medications    Medication Sig Last Dose Taking? Auth Provider Long Term End Date   acetaminophen (TYLENOL) 325 MG tablet Take 2 tablets (650 mg) by mouth every 6 hours as needed for mild pain or other (and adjunct with moderate or severe pain or per patient request)   Brook East MD     acetaminophen (TYLENOL) 500 MG tablet Take 1,000 mg by mouth every 8 hours as needed for mild pain   Reported, Patient     amLODIPine (NORVASC) 5 MG tablet Take 1 tablet (5 mg) by mouth daily   Maynor Rios PA Yes    aspirin-acetaminophen-caffeine (EXCEDRIN MIGRAINE) 250-250-65 MG tablet Take 1 tablet by mouth daily as needed for headaches   Reported, Patient     Aspirin-Caffeine (JUAN A BACK & BODY PO) Take 2 tablets by mouth 2 times daily as needed   Reported, Patient     celecoxib (CELEBREX) 200 MG capsule Take 1 capsule (200 mg) by mouth 2 times daily   Maynor Rios PA Yes    doxepin (SINEQUAN) 10 MG/ML (HIGH CONC) solution Take 2.5 mLs (25 mg) by mouth 2 times daily as needed (rinse for mucositis) Dilute the 2.5 mL of doxepin to 5 ml total in sterile or distilled water.   Maynor Rios PA Yes    escitalopram (LEXAPRO) 10 MG tablet Take 1 tablet (10 mg) by mouth daily   Maynor Rios PA Yes    guaiFENesin-codeine (GUAIFENESIN AC) 100-10 MG/5ML syrup Take 10 mLs by mouth every 4 hours as needed for cough   Maynor Rios PA     HYDROcodone-acetaminophen (NORCO) 5-325 MG tablet Take 1 tablet by mouth every 6 hours as needed for moderate  "pain (4-6)   Day, Carlos PIERRE PA-C     hydrocortisone (CORTEF) 10 MG tablet Take 20 mg in the morning and 10 mg in the afternoon   Jamir Grant MD Yes    Insulin Syringe-Needle U-100 27.5G X 5/8\" 2 ML MISC 1 each daily as needed (with solucortef for adrenal crisis)   Jamir Grant MD     levothyroxine (SYNTHROID/LEVOTHROID) 75 MCG tablet Take 1 tablet (75 mcg) by mouth every morning   Jamir Grant MD Yes    Menthol-Methyl Salicylate (DALIA MALIK GREASELESS) cream Apply topically every 6 hours as needed   Unknown, Entered By History     nystatin (MYCOSTATIN) 890363 UNIT/ML suspension Swish and spit 500,000 Units in mouth 4 times daily   Unknown, Entered By History     omeprazole (PRILOSEC) 20 MG DR capsule Take 40 mg by mouth daily   Maynor Rios PA     ondansetron (ZOFRAN) 4 MG tablet Take 1-2 tablets (4-8 mg) by mouth every 8 hours as needed for nausea   Maynor Rios PA     phosphorus tablet 250 mg (PHOSPHA 250 NEUTRAL) 250 MG per tablet Take 1 tablet (250 mg) by mouth 2 times daily  Patient taking differently: Take 250 mg by mouth daily   Primo Vora MD     potassium chloride ER (KLOR-CON M) 20 MEQ CR tablet Take 1 tablet (20 mEq) by mouth daily  Patient taking differently: Take 20 mEq by mouth 2 times daily   Primo Vora MD     prochlorperazine (COMPAZINE) 5 MG tablet Take 1 tablet (5 mg) by mouth every 6 hours as needed for nausea or vomiting . Caution: causes sedation.   Maynor Rios PA     senna-docusate (SENOKOT-S/PERICOLACE) 8.6-50 MG tablet Take 1 tablet by mouth 2 times daily as needed for constipation   Brook East MD     SUMAtriptan (IMITREX) 50 MG tablet Take 1 tablet (50 mg) by mouth at onset of headache for migraine May repeat in 2 hours. Max 4 tablets/24 hours.   Maynor Rios PA     triamcinolone (KENALOG) 0.1 % external cream Apply topically 2 times daily to bothersome skin areas.  Patient taking differently: " Apply topically 2 times daily as needed to bothersome skin areas.   Maynor Rios PA          REVIEW OF SYSTEMS:  12 point reviewed pertinent negatives and positives in HPI all others negative     PHYSICAL EXAM  Temp:  [97.4  F (36.3  C)-98  F (36.7  C)] 98  F (36.7  C)  Pulse:  [] 79  Resp:  [12-18] 14  BP: (134-180)/() 177/76  SpO2:  [96 %-100 %] 98 %  Wt Readings from Last 1 Encounters:   12/26/22 62.1 kg (137 lb)     There is no height or weight on file to calculate BMI.  Physical Exam  Constitutional:       Appearance: He is ill-appearing.      Comments: Uncomfortable but nontoxic, NAD   HENT:      Head: Normocephalic and atraumatic.      Nose: Nose normal.      Mouth/Throat:      Mouth: Mucous membranes are moist.      Pharynx: Oropharynx is clear.   Eyes:      Extraocular Movements: Extraocular movements intact.      Pupils: Pupils are equal, round, and reactive to light.      Comments: Pale conjunctiva   Cardiovascular:      Rate and Rhythm: Normal rate and regular rhythm.      Pulses: Normal pulses.   Pulmonary:      Effort: Pulmonary effort is normal. No respiratory distress.      Breath sounds: Normal breath sounds. No wheezing, rhonchi or rales.   Abdominal:      General: Bowel sounds are normal. There is no distension.      Palpations: Abdomen is soft. There is no mass.      Tenderness: There is no guarding.      Comments: LLQ tenderness with palpation no masses  No rebound or rigidity   Musculoskeletal:         General: Normal range of motion.      Cervical back: Normal range of motion and neck supple.      Comments: BLE ankle edema   Skin:     General: Skin is warm and dry.      Capillary Refill: Capillary refill takes less than 2 seconds.      Comments: Bruising on arms    Neurological:      General: No focal deficit present.      Mental Status: He is alert and oriented to person, place, and time. Mental status is at baseline.      Cranial Nerves: No cranial nerve deficit.       Sensory: No sensory deficit.         PERTINENT LABS and RADIOLOGY   No results found for this visit on 12/26/22.    Recent Labs   Lab 12/26/22  2127 12/26/22  1614 12/26/22  1338 12/25/22  0632 12/25/22  0225 12/25/22  0030 12/24/22  2137 12/24/22  1202 12/23/22  1822 12/23/22  0545 12/22/22  1053   WBC  --   --  18.5*  --   --   --   --  11.7*  --  6.7 8.0   HGB 8.2* 6.4* 6.7*  --   --   --   --  8.7*  --  8.2* 9.7*   MCV  --   --  97  --   --   --   --  95  --  97 97   PLT  --   --  141*  --   --   --   --  152  --  130* 143*   NA  --   --  141  --   --   --   --   --   --  137 137   POTASSIUM  --   --  3.4  --   --   --   --   --   --  4.3 3.9   CHLORIDE  --   --  106  --   --   --   --   --   --  103 102   CO2  --   --  26  --   --   --   --   --   --  23 25   BUN  --   --  44.8*  --   --   --   --   --   --  33.6* 30.8*   CR  --   --  1.29*  --   --  1.29*  --   --   --  1.50* 1.58*   ANIONGAP  --   --  9  --   --   --   --   --   --  11 10   COTY  --   --  8.9  --   --   --   --   --   --  8.8 9.3   GLC  --   --  135* 136* 155*  --    < >  --    < > 135* 107*   ALBUMIN  --   --  3.2*  --   --   --   --  3.4*  --  3.2* 3.6   PROTTOTAL  --   --  5.4*  --   --   --   --  5.8*  --  5.6* 6.5   BILITOTAL  --   --  0.3  --   --   --   --  0.3  --  0.3 0.5   ALKPHOS  --   --  497*  --   --   --   --  456*  --  527* 762*   ALT  --   --  133*  --   --   --   --  109*  --  148* 225*   AST  --   --  59*  --   --   --   --  39  --  73* 197*   LIPASE 24  --   --   --   --   --   --   --   --   --   --     < > = values in this interval not displayed.     Recent Results (from the past 24 hour(s))   CT Abdomen Pelvis w Contrast    Narrative    CT ABDOMEN AND PELVIS WITH CONTRAST 12/26/2022 3:18 PM    CLINICAL HISTORY: Left upper quadrant pain. Recent laparoscopic  cholecystectomy. History of metastatic spindle cell sarcoma and  malignant mesothelioma.  TECHNIQUE: CT scan of the abdomen and pelvis was performed  following  injection of IV contrast. Multiplanar reformats were obtained. Dose  reduction techniques were used.  CONTRAST: 61mL of Isovue 370  COMPARISON: CT of the chest, abdomen, and pelvis performed 12/22/2022.    FINDINGS:   LOWER CHEST: The visualized lung bases are clear. Small hiatal hernia.    HEPATOBILIARY: Several small indeterminate hypodensities in the liver  are too small to characterize, but are unchanged. Interval  postoperative changes of cholecystectomy. Pneumobilia is new since the  previous exam, and may be related to a recent ERCP.    PANCREAS: Normal.    SPLEEN: Hypoenhancing mass within the spleen superiorly and  posteriorly has not significantly changed, measured at 9.1 x 8.7 cm.  This mass again abuts the left hemidiaphragm posteriorly as well as  the upper pole of the left kidney.    ADRENAL GLANDS: Normal.    KIDNEYS/BLADDER: Several bilateral renal cysts are unchanged, and  would require no specific follow-up. The kidneys are otherwise  unremarkable. No hydronephrosis.    BOWEL: No bowel obstruction. Scattered sigmoid diverticulosis. No  convincing evidence for colitis or diverticulitis. Unremarkable  appendix.    PELVIC ORGANS: Mild prostatic enlargement. Small amount of nonspecific  free fluid in the pelvis, new since the previous exam.    LYMPH NODES: No enlarged lymph nodes are identified in the abdomen or  pelvis.    VASCULATURE: Mild luminal narrowing in the proximal SMA is not  significantly changed (series 4 image 42).    ADDITIONAL FINDINGS: Free intraperitoneal air anteriorly is likely  related to the recent laparoscopic cholecystectomy.    MUSCULOSKELETAL: No destructive bony lesions are identified.      Impression    IMPRESSION:   1.  Hypoenhancing splenic mass has not significantly changed,  measuring 9.1 cm, and is again noted to abut the left hemidiaphragm  and upper left kidney.  2.  Interval postoperative changes of laparoscopic cholecystectomy are  noted. Free  intraperitoneal air is also likely postoperative.  3.  Pneumobilia is new since the previous exam, and likely related to  a recent ERCP.  4.  Small amount of nonspecific free fluid in the pelvis is new since  the previous exam.  5.  Mild luminal narrowing in the proximal SMA is unchanged, and is of  uncertain clinical significance.    LELIA RODRIGUEZ MD         SYSTEM ID:  [id number]           Chandni Toledo MD  [facility]  [phone]

## 2022-12-27 NOTE — PLAN OF CARE
Problem: Plan of Care - These are the overarching goals to be used throughout the patient stay.    Goal: Optimal Comfort and Wellbeing  Outcome: Progressing   Goal Outcome Evaluation:       Pt is alert and oriented. Up independently to the bathroom. K+ bumps given overnight. Recheck this morning. PRN dilaudid given x1 for abdominal discomfort. Pt is NPO except meds. Uneventful shift, pt has been resting when rounded upon.  Rolanda Cary RN

## 2022-12-28 NOTE — PLAN OF CARE
Shift Summary [id number]  Patient AOx4. VSS on RA. Denies chest pain, n/v and SOB. Up independently in room, steady gait. Prn oxycodone given x2 and prn dilaudid given x1 for generalized back pain, see MAR. R) port in place. IV abx continued. Fall precautions in place. Using call light appropriately.    Goal Outcome Evaluation:  Problem: Pain Acute  Goal: Optimal Pain Control and Function  Intervention: Prevent or Manage Pain  Recent Flowsheet Documentation  Taken 12/28/2022 0350 by Carito Minor RN  Medication Review/Management: medications reviewed  Taken 12/27/2022 2047 by Carito Minor, RN  Medication Review/Management: medications reviewed  Problem: Plan of Care - These are the overarching goals to be used throughout the patient stay.    Goal: Optimal Comfort and Wellbeing  Outcome: Progressing  Problem: Plan of Care - These are the overarching goals to be used throughout the patient stay.    Goal: Absence of Hospital-Acquired Illness or Injury  Intervention: Identify and Manage Fall Risk  Recent Flowsheet Documentation  Taken 12/28/2022 0350 by Carito Minor, RN  Safety Promotion/Fall Prevention:   assistive device/personal items within reach   clutter free environment maintained   fall prevention program maintained   nonskid shoes/slippers when out of bed   patient and family education  Taken 12/27/2022 2047 by Carito Minor RN  Safety Promotion/Fall Prevention:   assistive device/personal items within reach   clutter free environment maintained   fall prevention program maintained   nonskid shoes/slippers when out of bed   patient and family education

## 2022-12-28 NOTE — PLAN OF CARE
Shift from 0700 to 1930-      Problem: Gastrointestinal Bleeding  Goal: Optimal Coping with Acute Illness  12/28/2022 1044 by Astrid Kraft RN  Outcome: Progressing  12/28/2022 1044 by Astrid Kraft RN  Outcome: Progressing  Goal: Hemostasis  12/28/2022 1044 by Astrid Kraft RN  Outcome: Progressing  12/28/2022 1044 by Astrid Kraft RN  Outcome: Progressing     Problem: Pain Acute  Goal: Optimal Pain Control and Function  12/28/2022 1044 by Astrid Kraft RN  Outcome: Progressing  12/28/2022 1044 by Astrid Kraft RN  Outcome: Progressing  Intervention: Prevent or Manage Pain  Recent Flowsheet Documentation  Taken 12/28/2022 0822 by Astrid Kraft RN  Medication Review/Management: medications reviewed     Problem: Electrolyte Imbalance  Goal: Electrolyte Imbalance: Plan of Care  Outcome: Progressing       Goal Outcome Evaluation:    Patient continues to have back and abdominal pain. Given oxycodone prn. Pain has improved today. MD added scheduled oxycodone 2.5 and scheduled tylenol. See MAR.    Patient sleeps a lot. Didn't eat breakfast or lunch even with a lot of encouragement. Seen by [facility] and ok to advance diet. Patient ate well for dinner but only tried fulls. Encouraged to try regular foods.    No signs of active bleeding. Hgb was 8.0 today.   MD's aware.     [facility] and Dr. Can updated wife.    BP elevated. MD notified. Given hydralazine, and lisinopril started today.Continued to be monitored. Pt on other BP meds also.     Continued on IV antibiotics.     Patient on K+ and Mg+ protocol; K+ was 3.1 this morning; Given KDUR then recheck in afternoon was 3.4. Redosed per protocol and recheck for K+ is at 2100.   Mg+ was 2.2; Recheck in am.     Needs a lot of encouragement to walk outside of room.

## 2022-12-28 NOTE — PROGRESS NOTES
[facility] - [facility] Progress Note     IMPRESSION:  72 year old male with history of metastatic spindle cell sarcoma, malignant mesothelioma, mediastinal lymphadenopathy, left vocal fold paralysis, central hypothyroidism, GERD, kidney stones, and DJD who was readmitted on 12/26/22 for left-sided abdominal pains and acute drop in Hgb from 8.7 -> 6.7. He was previously admitted from 12/22 - 12/25/22 for elevated LFT's, choledocholithiasis s/p ERCP and lap carmelita.     -MRCP 12/22/22- Mild biliary ductal dilatation. Small region of low signal at the distal common bile duct could represent gallbladder sludge extending to this region. There is gallbladder sludge noted within the gallbladder lumen as well. The common bile duct measures 9 mm.  Incidental findings of a few tiny cystic lesions at the pancreas suggesting small cystic neoplasms.   -ERCP 12/23/22 - Choledocholithiasis removed and sphincterotomy.   - Lap carmelita 12/24/22    1. Anemia  - with reported melena (last episode was the morning of 12/27). Hgb decline from 8.7 -> 6.7. Now back to 8.2 after 1 unit PRBC. He is NOT on anticoagulation. INR 1.12. No NSAIDs.   - EGD 12/27 to assess for post-sphincterotomy bleed was normal, no evidence of fresh or old blood, no bleeding from sphincterotomy site.   - Hgb fairly stable at 8.0 today.     2. Metastatic spindle cell sarcoma, diagnosed March 2021  - Chemotherapy on hold for past month  - known mediastinal mass, known lung metastases, splenic mass, liver metastases  - Ongoing abdominal pains, likely related to cancer. No significant acute changes seen on CT.     RECOMMENDATIONS:  - No further evidence of melena, hemoglobin remains stable.   - Diet: ADAT  - Pain control per primary team    GI will sign off at this time. Thank you for consulting us on this pleasant patient. Please call if questions arise or the patient's status changes.       Approximately 25 minutes of total time was spent providing patient care,  "including patient evaluation, reviewing documentation/test results, and     Fawad Cook PA-C  [facility]  [phone]  ________________________________________________________________________      SUBJECTIVE:    Continues with abdominal pains, somewhat managed with PO pain meds.   No bloody stools or melena seen overnight or this morning.      OBJECTIVE:  BP (!) 173/84 (BP Location: Right arm)   Pulse 105   Temp 97.8  F (36.6  C) (Oral)   Resp 18   Ht 1.702 m (5' 7\")   Wt 65 kg (143 lb 6.4 oz)   SpO2 95%   BMI 22.46 kg/m    Temp (24hrs), Av.8  F (36.6  C), Min:97.4  F (36.3  C), Max:98  F (36.7  C)    Patient Vitals for the past 72 hrs:   Weight   22 1237 65 kg (143 lb 6.4 oz)       Intake/Output Summary (Last 24 hours) at 2022 0913  Last data filed at 2022 2236  Gross per 24 hour   Intake 111 ml   Output --   Net 111 ml        PHYSICAL EXAM  GEN: Alert, oriented x3, communicative and in NAD.    LYMPH: No LAD noted.  HRT: RRR  LUNGS: CTA  ABD: ND, +BS, + pain to palpation worse in the LLQ but felt diffusely, no rebound, no HSM.  SKIN: No rash, jaundice or spider angiomata      LABS:  I have reviewed the patient's new clinical lab results:     Recent Labs   Lab Test 22  0526 22  0602 22  2127 22  1614 22  1338 22  1937 22  1606 21  1746 21  1730 21  1410 21  1216   WBC 20.5* 24.2*  --   --  18.5*   < > 12.2*   < >  --    < > 10.5   HGB 8.0* 8.2* 8.2*   < > 6.7*   < > 10.2*   < >  --    < > 15.1   MCV 96 96  --   --  97   < > 92   < >  --    < > 89   * 131*  --   --  141*   < > 317   < >  --    < > 260   INR  --   --   --   --   --   --  1.12  --  1.14  --  1.13    < > = values in this interval not displayed.     Recent Labs   Lab Test 22  0526 22  0602 22  1338   POTASSIUM 3.1* 3.8  3.8 3.4   CHLORIDE 105 106 106   CO2  26   BUN 24.2* 35.6* 44.8*   CR 1.02 1.16 1.29* "   ANIONGAP 8 8 9     Recent Labs   Lab Test 12/27/22  0602 12/26/22  2127 12/26/22  1519 12/26/22  1338 12/24/22  1202 12/23/22  0545 12/22/22  1126 11/26/22  0555 11/25/22  1714 09/18/22  1716 09/18/22  1508 06/21/21  1410 03/29/21  1022   ALBUMIN 3.2*  --   --  3.2* 3.4*   < >  --    < >  --    < > 3.0*   < > 3.3*   BILITOTAL 0.4  --   --  0.3 0.3   < >  --    < >  --    < > 1.4*   < > 0.4   *  --   --  133* 109*   < >  --    < >  --    < > 30   < > 19   AST 42  --   --  59* 39   < >  --    < >  --    < > 24   < > 17   PROTEIN  --   --  30*  --   --   --  30*  --  50*   < >  --    < >  --    LIPASE  --  24  --   --   --   --   --   --   --   --  10*  --  79    < > = values in this interval not displayed.         IMAGING:  CT ABDOMEN AND PELVIS WITH CONTRAST 12/26/2022 3:18 PM     CLINICAL HISTORY: Left upper quadrant pain. Recent laparoscopic  cholecystectomy. History of metastatic spindle cell sarcoma and  malignant mesothelioma.  TECHNIQUE: CT scan of the abdomen and pelvis was performed following  injection of IV contrast. Multiplanar reformats were obtained. Dose  reduction techniques were used.  CONTRAST: 61mL of Isovue 370  COMPARISON: CT of the chest, abdomen, and pelvis performed 12/22/2022.     FINDINGS:   LOWER CHEST: The visualized lung bases are clear. Small hiatal hernia.     HEPATOBILIARY: Several small indeterminate hypodensities in the liver  are too small to characterize, but are unchanged. Interval  postoperative changes of cholecystectomy. Pneumobilia is new since the  previous exam, and may be related to a recent ERCP.     PANCREAS: Normal.     SPLEEN: Hypoenhancing mass within the spleen superiorly and  posteriorly has not significantly changed, measured at 9.1 x 8.7 cm.  This mass again abuts the left hemidiaphragm posteriorly as well as  the upper pole of the left kidney.     ADRENAL GLANDS: Normal.     KIDNEYS/BLADDER: Several bilateral renal cysts are unchanged, and  would require  no specific follow-up. The kidneys are otherwise  unremarkable. No hydronephrosis.     BOWEL: No bowel obstruction. Scattered sigmoid diverticulosis. No  convincing evidence for colitis or diverticulitis. Unremarkable  appendix.     PELVIC ORGANS: Mild prostatic enlargement. Small amount of nonspecific  free fluid in the pelvis, new since the previous exam.     LYMPH NODES: No enlarged lymph nodes are identified in the abdomen or  pelvis.     VASCULATURE: Mild luminal narrowing in the proximal SMA is not  significantly changed (series 4 image 42).     ADDITIONAL FINDINGS: Free intraperitoneal air anteriorly is likely  related to the recent laparoscopic cholecystectomy.     MUSCULOSKELETAL: No destructive bony lesions are identified.                                                                      IMPRESSION:   1.  Hypoenhancing splenic mass has not significantly changed,  measuring 9.1 cm, and is again noted to abut the left hemidiaphragm  and upper left kidney.  2.  Interval postoperative changes of laparoscopic cholecystectomy are  noted. Free intraperitoneal air is also likely postoperative.  3.  Pneumobilia is new since the previous exam, and likely related to  a recent ERCP.  4.  Small amount of nonspecific free fluid in the pelvis is new since  the previous exam.  5.  Mild luminal narrowing in the proximal SMA is unchanged, and is of  uncertain clinical significance.

## 2022-12-29 NOTE — PROGRESS NOTES
[facility]    Medicine Progress Note - Hospitalist Service    Date of Admission:  12/26/2022    Assessment & Plan      Ifrah Huitron is a 72 year old male presenting with metastatic spindle cell sarcoma, malignant mesothelioma, mediastinal lymphadenopathy, left vocal fold paralysis, central hypothyroidism, GERD, kidney stones, and DJD who was admitted on 12/22/2022 as a transfer from [facility]. He was found to have fever, weakness, and an episode of presyncope 1 day prior to arrival.  He was seen by his infusion center and noted to have elevated LFTs and told to come to the ED, discharge 12/25 after ERCP with sphincterotomy and Lap carmelita.      ABL anemia with melena/Abd pain  - Hgb 6.4 given 1 unit started at [facility]  - Hgb stable now  - S/P  ERCP on 12/23 Choledocholithiasis with an obstruction was found.Complete removal was accomplished by biliary  sphincterotomy, dilation and balloon extraction. Indocin pr.   - ERCP viewed esophagus, stomach and Upper GI normal, last colonoscopy in 1220 unremarkable.   - S/P Lap carmelita 12/24  - Surgery and GI consulted   - CT  Hypoenhancing splenic mass has not significantly changed, measuring 9.1 cm, and is again noted to abut the left hemidiaphragm and upper left kidney. Interval postoperative changes of laparoscopic cholecystectomy are  noted. Free intraperitoneal air is also likely postoperative. Pneumobilia is new since the previous exam, and likely related to a recent ERCP. Small amount of nonspecific free fluid in the pelvis is new since the previous exam. Mild luminal narrowing in the proximal SMA is unchanged, and is of uncertain clinical significance.  - Upper endo on 12/27 negative for bleeding from billiary tract  - Surgery feels surgical blood loss unlikely  - stop NSAIDs.  - advance to full diet today.     Left flank pain  - this is new, CT reviewed does not show new bleed or advancing mass since previous.  US  L Renal showed no  hydronephrosis. to assess  - leukocytosis likely due to cortef  - start oxycodone for cancer pain, try to wean IV dilaudid.  - follow up to restart chemo with outpatient oncology.     Sepsis - resolved  - Leukocytosis   - blood cx - NGTD, urine not C/W infection.  - IV meropenem stopped today.  - he is on steroids  - lactic normal      Metastatic spindle cell sarcoma Diagnosed in March 2021, follows outpatient with the [facility] cancer center in [place] GERALDO Rios  - per chart chemotherapy has been on hold for the past month due to associated toxicities  -Mediastinal mass, known lung metastases, splenic mass, liver metastases.  -CT 12/22: Several new pulmonary nodules, slightly larger mass involving the spleen, stable anterior mediastinal mass, repeat CT 12/26 shows same mass no change.  - he should f/u with them as outpatient.      Elevated LFTs stable no real change from previous   - trend labs   - GI and surgery signed off.     Essential hypertension  - NPO   - pain control and Bps still elevated  - Prn IV hydralazine   - restart home amlodipine and add lisinopril  - Stop IVF     Hypopituitarism  Hypothyroidism  - restarting cortef had been on hydrocortisone 10mg BID for now IV due to concern for sepsis.  - cortef at double dosing due to acute illness   - continue levothyroxine      Depression  - hold lexapro     CKD 3 stable  - IVF  - trend labs      Diet: Advance Diet as Tolerated: Regular Diet Adult    DVT Prophylaxis: Pneumatic Compression Devices and Ambulate every shift  Bonner Catheter: Not present  Central Lines: PRESENT     Cardiac Monitoring: None  Code Status: Full Code      Disposition Plan      Expected Discharge Date: 12/29/2022                The patient's care was discussed with the Patient and Patient's Family.    Roger Can MD  Hospitalist Service  [facility]  Securely message with the Vocera Web Console (learn more here)  Text page via [employer]  Paging/Directory         Clinically Significant Risk Factors        # Hypokalemia: Lowest K = 3.1 mmol/L in last 2 days, will replace as needed       # Hypoalbuminemia: Lowest albumin = 3.2 g/dL at 12/27/2022  6:02 AM, will monitor as appropriate   # Thrombocytopenia: Lowest platelets = 121 in last 2 days, will monitor for bleeding                 ______________________________________________________________________    Interval History   Pain of left flank still problematic.  Improves with oxycodone though.  Very anxious that this could be cancer pain and no other explaination.    Data reviewed today: I reviewed all medications, new labs and imaging results over the last 24 hours. I personally reviewed.     Physical Exam   Vital Signs: Temp: 97.8  F (36.6  C) Temp src: Oral BP: 131/66 Pulse: 91   Resp: 18 SpO2: 98 % O2 Device: None (Room air)    Weight: 143 lbs 6.4 oz  Constitutional: awake, fatigued and mild distress  ENT: normocepalic, without obvious abnormality  Respiratory: No increased work of breathing, good air exchange, clear to auscultation bilaterally, no crackles or wheezing  Cardiovascular: normal S1 and S2  GI: normal bowel sounds and tenderness noted in the left lower quadrant  Skin: no bruising or bleeding and no jaundice  Neurologic: Mental Status Exam:  Level of Alertness:   awake  Cranial Nerves:  cranial nerves II-XII are grossly intact    Data   Recent Labs   Lab 12/28/22  1452 12/28/22  0526 12/27/22  0602 12/26/22  2127 12/26/22  1614 12/26/22  1338   WBC  --  20.5* 24.2*  --   --  18.5*   HGB  --  8.0* 8.2* 8.2*   < > 6.7*   MCV  --  96 96  --   --  97   PLT  --  121* 131*  --   --  141*   NA  --  139 140  --   --  141   POTASSIUM 3.4 3.1* 3.8  3.8  --   --  3.4   CHLORIDE  --  105 106  --   --  106   CO2  --  26 26  --   --  26   BUN  --  24.2* 35.6*  --   --  44.8*   CR  --  1.02 1.16  --   --  1.29*   ANIONGAP  --  8 8  --   --  9   COTY  --  8.6* 8.7*  --   --  8.9   GLC  --  91 97  --    --  135*   ALBUMIN  --   --  3.2*  --   --  3.2*   PROTTOTAL  --   --  5.4*  --   --  5.4*   BILITOTAL  --   --  0.4  --   --  0.3   ALKPHOS  --   --  427*  --   --  497*   ALT  --   --  105*  --   --  133*   AST  --   --  42  --   --  59*   LIPASE  --   --   --  24  --   --     < > = values in this interval not displayed.     No results found for this or any previous visit (from the past 24 hour(s)).  Medications       acetaminophen  650 mg Oral TID     amLODIPine  5 mg Oral Daily     anticoagulant citrate  5-10 mL Intracatheter Q28 Days     hydrocortisone  20 mg Oral Daily before supper     hydrocortisone  40 mg Oral Daily     levothyroxine  75 mcg Oral QAM     lisinopril  20 mg Oral Daily     [Held by provider] nystatin  500,000 Units Swish & Spit 4x Daily     oxyCODONE  2.5 mg Oral Q6H     sodium chloride (PF)  10-20 mL Intracatheter Q28 Days     sodium chloride (PF)  3 mL Intracatheter Q8H     sodium chloride (PF)  3 mL Intracatheter Q8H

## 2022-12-29 NOTE — PROGRESS NOTES
Daily Progress Note    Assessment/Plan:  Ifrah Huitron is a 72 year old male  with metastatic spindle cell sarcoma, malignant mesothelioma, mediastinal lymphadenopathy, left vocal fold paralysis. who was admitted on 12/22/2022 as a transfer from [facility]. He was found to have fever, weakness, and an episode of presyncope 1 day prior to arrival.  He was readmitted on 12/26/2022 for left-sided abdominal pain and acute drop in hemoglobin from 8.7-6.7.  He was previously admitted from 12/22-12/25 for elevated LFTs, choledocholithiasis status post ERCP and lap carmelita.  Still hospitalized secondary to suboptimal pain control.     ABL anemia with melena/Abd pain  - Hgb 6.4 given 1 unit started at [facility]  - Hgb stable now  - S/P  ERCP on 12/23 Choledocholithiasis with an obstruction was found.Complete removal was accomplished by biliary  sphincterotomy, dilation and balloon extraction. Indocin pr.   - ERCP viewed esophagus, stomach and Upper GI normal, last colonoscopy in 1220 unremarkable.   - S/P Lap carmelita 12/24  - Surgery and GI consulted   - CT  Hypoenhancing splenic mass has not significantly changed, measuring 9.1 cm, and is again noted to abut the left hemidiaphragm and upper left kidney. Interval postoperative changes of laparoscopic cholecystectomy are  noted. Free intraperitoneal air is also likely postoperative. Pneumobilia is new since the previous exam, and likely related to a recent ERCP. Small amount of nonspecific free fluid in the pelvis is new since the previous exam. Mild luminal narrowing in the proximal SMA is unchanged, and is of uncertain clinical significance.  - Upper endo on 12/27 negative for bleeding from billiary tract  - Surgery feels surgical blood loss unlikely  - stop NSAIDs.  - advance      Left flank pain  - this is new, CT reviewed does not show new bleed or advancing mass since previous.  US  L Renal showed no hydronephrosis. to assess  - leukocytosis likely due to  cortef  - start oxycodone for cancer pain  -Will consult pain team for pain management recommendations  - follow up to restart chemo with outpatient oncology.     Sepsis - resolved  - Leukocytosis   - blood cx - NGTD, urine not C/W infection.  - IV meropenem stopped.  - he is on steroids  - lactic normal      Metastatic spindle cell sarcoma Diagnosed in March 2021, follows outpatient with the [facility] in [facility]  - per chart chemotherapy has been on hold for the past month due to associated toxicities  -Mediastinal mass, known lung metastases, splenic mass, liver metastases.  -CT 12/22: Several new pulmonary nodules, slightly larger mass involving the spleen, stable anterior mediastinal mass, repeat CT 12/26 shows same mass no change.  - he should f/u with them as outpatient.      Elevated LFTs stable no real change from previous   - trend labs   - GI and surgery signed off.     Essential hypertension  - NPO   - pain control and Bps still elevated  - Prn IV hydralazine   - restart home amlodipine and add lisinopril  - Stop IVF     Hypopituitarism  Hypothyroidism  - restarting cortef had been on hydrocortisone 10mg BID for now IV due to concern for sepsis.  - cortef at double dosing due to acute illness   - continue levothyroxine      Depression  - hold lexapro     CKD 3 stable  - IVF  - trend labs           Bonner Catheter: Not present  Central Lines: PRESENT         Code status:Full Code        Barriers to Discharge: Pain control    Disposition: Anticipate discharge home tomorrow if pain adequately controlled    Subjective:  Ifrah is new to me today, chart reviewed and discussed with staff.  Currently his pain is well controlled but he reports having had a lot of pain overnight and although his current medication has been helpful it seems to wear off quickly.  He denies any new symptoms, he denies any chest pain or shortness of breath.        Current Medications Reviewed via  EHR List    Objective:  Vital signs in last 24 hours:  [unfilled]  .prog  Weight:   @THISENCWEIGHTS(1)@  Weight change:   Body mass index is 22.46 kg/m .    Intake/Output last 3 shifts:  I/O last 3 completed shifts:  In: 2110 [P.O.:2100; I.V.:10]  Out: -   Intake/Output this shift:  No intake/output data recorded.    Physical Exam:  General: comfortable appearing.  CV: Regular rate and rhythm, no lower extremity edema  Lungs: Decreased breath sounds but clear  Abdomen: Soft, nontender        Imaging:  Personally Reviewed.  CT Chest/Abdomen/Pelvis w Contrast    Result Date: 12/22/2022  CT CHEST/ABDOMEN/PELVIS WITH CONTRAST 12/22/2022 2:13 PM CLINICAL HISTORY: Near syncopal spell. Fever, weakness, elevated liver enzymes. 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 Last imaging 11/7/2022. Evaluate for interval change. TECHNIQUE: CT scan of the chest, abdomen, and pelvis was performed following injection of IV contrast. Multiplanar reformats were obtained. Dose reduction techniques were used. CONTRAST: 68 mL Isovue 370 COMPARISON: CT chest, abdomen and pelvis 11/7/2022. FINDINGS: LUNGS AND PLEURA: No effusions. No pneumothorax. New indeterminant 0.5 cm pulmonary nodule medial right lower lobe series 3 image 184. New subpleural right upper lobe lateral nodule measuring 0.2 cm, image 85. There are multiple additional examples of new small nodules bilaterally, some with a groundglass character, for example, image 126 and others appearing small and solid clustered together, image 165. MEDIASTINUM/AXILLAE: Stable mass at the anterior mediastinum and medial left upper lobe region that is 3.7 x 2.7 cm series 2 image 62. Right chest Port-A-Cath. Stable small mediastinal lymph nodes. No acute mediastinal abnormality. Stable small nodule at the anterior left pericardial fat measuring 0.7 cm image 85. CORONARY ARTERY CALCIFICATION: Mild. HEPATOBILIARY: Several  small hypodensities in the liver are stable. Some are suggestive of cysts, but others are too small for characterization. No new liver lesion identified. Gallbladder shows no acute abnormality. PANCREAS: Normal. SPLEEN: Lobulated splenic mass is slightly larger measuring 9.3 x 8.4 cm, previously 8.9 x 7.9 cm image 129. This contacts the posterior left hemidiaphragm. A portion of this also contacts the upper left kidney. ADRENAL GLANDS: Normal. KIDNEYS/BLADDER: Stable bilateral renal cysts without specific imaging follow-up recommended. No hydronephrosis or stone. Bladder is unremarkable. BOWEL: Colonic diverticula. No acute bowel abnormality. Normal appendix. PELVIC ORGANS: Prostate is 4.9 cm. ADDITIONAL FINDINGS: No new adenopathy identified. MUSCULOSKELETAL: Stable small sclerotic foci within the bilateral pelvis, right femoral neck, left femoral head. No new bone lesion identified.     IMPRESSION: 1.  Several bilateral new pulmonary nodules identified. These are indeterminant. New pulmonary metastatic disease is a possibility, though this could be infectious or inflammatory nodules. As such, recommend short interval follow-up CT chest in three months. 2.  Slightly larger mass involving the spleen and contacting the adjacent left posterior hemidiaphragm and left upper kidney. 3.  Stable size of an anterior mediastinal mass. 4.  Small hypodensities in the liver are stable. Many of these appear to represent cysts, but others are too small for characterization. AMBER JASON MD   SYSTEM ID:  [id number]    US Renal Complete Non-Vascular    Result Date: 12/27/2022  EXAM: US RENAL COMPLETE NON-VASCULAR LOCATION: [facility] DATE/TIME: 12/27/2022 5:50 PM INDICATION: left flank pain, eval for hydro COMPARISON: CT 12/26/2022 TECHNIQUE: Routine Bilateral Renal and Bladder Ultrasound. FINDINGS: RIGHT KIDNEY: 10.8 x 5.2 x 5.5 cm. Simple upper pole cyst measuring 1.0 cm. No follow-up is indicated. No  hydronephrosis or shadowing stone. LEFT KIDNEY: 10.9 x 4.9 x 4.8 cm. Simple mid to upper pole cortical cysts with the larger measuring 1.3 cm. No hydronephrosis or shadowing stone. BLADDER: Normal.     IMPRESSION: 1.  No evidence of hydronephrosis. 2.  Small simple renal cortical cysts. No follow-up is indicated.    US Abdomen Limited    Result Date: 11/30/2022  ULTRASOUND ABDOMEN LIMITED 11/30/2022 12:12 PM CLINICAL HISTORY: Acute hyperbilirubinemia, assess for biliary dilation. Hyperbilirubinemia. Spindle cell sarcoma (H). TECHNIQUE: Limited abdominal ultrasound. COMPARISON: 11/7/2022, 10/17/2022 FINDINGS: GALLBLADDER: Echogenic sludge is seen in the gallbladder lumen. No shadowing gallstones. No gallbladder wall thickening or pericholecystic fluid. The ultrasonographer reported a negative sonographic Paez sign. BILE DUCTS: There is no biliary dilatation. The common duct measures 10 mm. LIVER: Normal where seen. RIGHT KIDNEY: No hydronephrosis. There is a simple-appearing cyst lateral aspect of the lower pole measuring 14 x 12 x 8 mm. No follow-up is necessary. PANCREAS: The visualized portions of the pancreas are normal. No ascites.     IMPRESSION: 1.  Gallbladder sludge without signs of acute cholecystitis. 2.  Nonspecific dilation of the common bile duct. Consider correlation with biliary function studies and follow-up MRCP if clinically indicated. 3.  Simple-appearing right renal cyst. No follow-up is necessary. ROBIN SLATER MD   SYSTEM ID:  [id number]    US Upper Extremity Venous Duplex Right    Result Date: 11/30/2022  US UPPER EXTREMITY VENOUS DUPLEX RIGHT 11/30/2022 12:08 PM CLINICAL HISTORY: Rule out DVT; Swelling of right hand. TECHNIQUE: Venous Duplex ultrasound of the right upper extremity with (when possible) and without compression, augmentation, and duplex. Color flow and spectral Doppler with waveform analysis performed. COMPARISON: None. FINDINGS: Ultrasound includes evaluation of the  internal jugular vein, innominate vein, subclavian vein, axillary vein, and brachial vein. The superficial cephalic and basilic veins were also evaluated where seen. RIGHT: No deep venous thrombosis. There is noncompressibility throughout the cephalic vein with loss of internal color Doppler flow, consistent with occlusive superficial thrombophlebitis. The basilic vein is completely compressible with normal waveforms.     IMPRESSION: 1.  Positive for superficial thrombophlebitis of the cephalic vein. 2.  No deep venous thrombosis in the right upper extremity. ROBIN SLATER MD   SYSTEM ID:  [id number]    Abdomen MRI w & w/o contrast mrcp    Result Date: 12/22/2022  EXAM: MR ABDOMEN MRCP W/O and W CONTRAST LOCATION: [facility] DATE/TIME: 12/22/2022 5:49 PM INDICATION: Elevated liver enzymes. Abnormal ultrasound on 11/30/2022. Fever, history of metastatic spindle cell carcinoma. Evaluate for acute hepatobiliary process. COMPARISON: CT of chest, abdomen and pelvis 12/22/2022. Ultrasound abdomen 11/30/2022. TECHNIQUE: Routine MR liver/pancreas protocol including axial and coronal MRCP sequences. 2D and 3D reconstruction performed by MR technologist including MIP reconstruction and slab cholangiograms. If performed with contrast, additional dynamic T1 post IV contrast images. CONTRAST: 7 mL Gadavist. FINDINGS: MRCP: Gallbladder sludge identified. No convincing acute inflammatory change of the gallbladder wall on the supplied MRI images. There is mild biliary ductal dilatation diffusely. Common bile duct is 9 mm. There is small ill-defined low signal within the  distal common bile duct for example series 3, image 19 and on series 4, image 20. Also see series 6, image 10. LIVER: There are multiple right and left hepatic simple cysts that are very small in size without worrisome features. No worrisome hepatic lesion identified. PANCREAS: No solid pancreas lesion. The T2-weighted images show a  few tiny cystic pancreas lesions without worrisome features. One of these at the uncinate measures 0.3 cm series 3, image 23. Another at the pancreas tail measures 0.3 cm series 3, image 25. No main pancreatic duct dilatation. ADDITIONAL FINDINGS: Large lobulated mass occupying the spleen again noted and described on the comparison CT. It measures approximately 9 x 8.1 cm and contacts the posterior left hemidiaphragm, and contacts the upper left kidney. Multiple bilateral renal cysts appear simple without specific imaging follow-up recommended. No hydronephrosis. Normal adrenals.     IMPRESSION: 1.  Mild biliary ductal dilatation. Small region of low signal at the distal common bile duct could represent gallbladder sludge extending to this region. There is gallbladder sludge noted within the gallbladder lumen as well. The common bile duct measures 9 mm. 2.  A few tiny cystic lesions at the pancreas suggesting small cystic neoplasms. One year follow-up MRI is recommended for surveillance. No pancreas duct dilatation. 3.  Lobulated mass at the spleen consistent with malignancy again identified. 4.  Several small simple hepatic cysts without worrisome features.     CT Abdomen Pelvis w Contrast    Result Date: 12/26/2022  CT ABDOMEN AND PELVIS WITH CONTRAST 12/26/2022 3:18 PM CLINICAL HISTORY: Left upper quadrant pain. Recent laparoscopic cholecystectomy. 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 TECHNIQUE: CT scan of the abdomen and pelvis was performed following injection of IV contrast. Multiplanar reformats were obtained. Dose reduction techniques were used. CONTRAST: 61mL of Isovue 370 COMPARISON: CT of the chest, abdomen, and pelvis performed 12/22/2022. FINDINGS: LOWER CHEST: The visualized lung bases are clear. Small hiatal hernia. HEPATOBILIARY: Several small indeterminate hypodensities in the liver are too small to characterize, but are unchanged. Interval postoperative changes of  cholecystectomy. Pneumobilia is new since the previous exam, and may be related to a recent ERCP. PANCREAS: Normal. SPLEEN: Hypoenhancing mass within the spleen superiorly and posteriorly has not significantly changed, measured at 9.1 x 8.7 cm. This mass again abuts the left hemidiaphragm posteriorly as well as the upper pole of the left kidney. ADRENAL GLANDS: Normal. KIDNEYS/BLADDER: Several bilateral renal cysts are unchanged, and would require no specific follow-up. The kidneys are otherwise unremarkable. No hydronephrosis. BOWEL: No bowel obstruction. Scattered sigmoid diverticulosis. No convincing evidence for colitis or diverticulitis. Unremarkable appendix. PELVIC ORGANS: Mild prostatic enlargement. Small amount of nonspecific free fluid in the pelvis, new since the previous exam. LYMPH NODES: No enlarged lymph nodes are identified in the abdomen or pelvis. VASCULATURE: Mild luminal narrowing in the proximal SMA is not significantly changed (series 4 image 42). ADDITIONAL FINDINGS: Free intraperitoneal air anteriorly is likely related to the recent laparoscopic cholecystectomy. MUSCULOSKELETAL: No destructive bony lesions are identified.     IMPRESSION: 1.  Hypoenhancing splenic mass has not significantly changed, measuring 9.1 cm, and is again noted to abut the left hemidiaphragm and upper left kidney. 2.  Interval postoperative changes of laparoscopic cholecystectomy are noted. Free intraperitoneal air is also likely postoperative. 3.  Pneumobilia is new since the previous exam, and likely related to a recent ERCP. 4.  Small amount of nonspecific free fluid in the pelvis is new since the previous exam. 5.  Mild luminal narrowing in the proximal SMA is unchanged, and is of uncertain clinical significance. LELIA RODRIGUEZ MD   SYSTEM ID:  [id number]    XR Surgery LO Fluoro G/T 5 Min w Stills    Result Date: 12/23/2022  EXAM: XR SURGERY LO FLUORO GREATER THAN 5 MIN W STILLS LOCATION: [facility]  [facility] DATE/TIME: 12/23/2022 2:54 PM INDICATION: intra op COMPARISON: MRCP 12/22/2022 TECHNIQUE: Exam performed by gastroenterologist. FLUOROSCOPIC TIME: 2.8 minutes NUMBER OF IMAGES: 7 FINDINGS: BILE DUCTS: Diffusely dilated up to 1.5 cm. A rounded filling defect in the distal common bile duct. PANCREATIC DUCT: Not imaged.     IMPRESSION: Biliary dilation with filling defect in the distal common bile duct, which was reportedly removed. Refer to ERCP report for full details.      Lab Results:  Personally Reviewed.   Fingerstick Blood Glucose: @[id number](POCGLUFGR:10)@    Last Hbg A1C: No results found for: HGBA1C   Lab Results   Component Value Date    INR 1.12 01/04/2022    INR 1.14 12/01/2021    INR 1.13 04/29/2021     Recent Results (from the past 24 hour(s))   Potassium    Collection Time: 12/28/22  2:52 PM   Result Value Ref Range    Potassium 3.4 3.4 - 5.3 mmol/L   Potassium    Collection Time: 12/28/22  9:38 PM   Result Value Ref Range    Potassium 3.5 3.4 - 5.3 mmol/L   Magnesium    Collection Time: 12/29/22  5:42 AM   Result Value Ref Range    Magnesium 2.0 1.7 - 2.3 mg/dL   Lipase    Collection Time: 12/29/22  5:42 AM   Result Value Ref Range    Lipase 10 (L) 13 - 60 U/L   Potassium    Collection Time: 12/29/22  5:42 AM   Result Value Ref Range    Potassium 3.8 3.4 - 5.3 mmol/L   CBC with platelets    Collection Time: 12/29/22  9:20 AM   Result Value Ref Range    WBC Count 16.3 (H) 4.0 - 11.0 10e3/uL    RBC Count 2.72 (L) 4.40 - 5.90 10e6/uL    Hemoglobin 8.2 (L) 13.3 - 17.7 g/dL    Hematocrit 26.3 (L) 40.0 - 53.0 %    MCV 97 78 - 100 fL    MCH 30.1 26.5 - 33.0 pg    MCHC 31.2 (L) 31.5 - 36.5 g/dL    RDW 18.2 (H) 10.0 - 15.0 %    Platelet Count 129 (L) 150 - 450 10e3/uL           Advanced Care Planning    Time > 35 minutes with greater than 50% of time spent in counseling and coordination of care.    Erik Argueta MD  Date: 12/29/2022  Time: 11:05 AM  [facility]  Medicine

## 2022-12-29 NOTE — CONSULTS
[facility] ACUTE PAIN SERVICE CONSULTATION     Date of Admission:  12/26/2022  Date of Consult (When I saw the patient): 12/29/22  Physician requesting consult: Dr. Arsalan Argueta   Reason for consult: cancer pain  Primary Care Physician: Dr. Kohler     Assessment/Plan:     Ifrah Huitron is a 72 year old male who was admitted on 12/26/2022.  Pain team was asked to see the patient for evaluation of left sided abdominal pain after ERCP on 12/23 and lap carmelita on 12/24. History of metastatic spindle cell carcinoma, malignant mesothelioma, mediastinal lymphadenopathy, GERD, renal stones, choledocholithiasis s/p ERCP on 12/23/22, and lap carmelita on 12/24 and was discharged 12/25/22.  Presented back to hospital 12/26/22 with left sided abdominal pain, nausea, weakness, fevers and chills, poor appetite. Describes pain as 2-6/10 and spasm, aching in the left low back and wraps around to LLQ abdomen.  In the last 24 hours, patient has utilized a total of 15 mg oxycodone, and no IV Dilaudid, MME about 22 mg. The patient denies nausea, vomiting, constipation, diarrhea, chest pain, shortness of breath.  The patient does not smoke and denies chemical dependency history.     Post op day: 2 Days Post-Op.  Patient s/p lap carmelita on 12/24, s/p ERCP on 12/23    Opioid Induced Respiratory Depression Risk Assessment:  Moderate: age, concomitant CNS depressants?    PLAN:   1) Pain is consistent with cancer pain, post op pain. Labs and imaging indicated: I have personally reviewed pertinent labs, tests, and radiologic imaging in patient's chart. Treatment plan includes multimodal pain approach, Hospital Medicine Service for medical management, GI. Patient educated regarding multimodal pain approach, medications as listed below. Patient is understanding of the plan. All questions and concerns addressed to patient's satisfaction.   2)Multimodal Medication Therapy  Topical: lidocaine 5% ointment qid  NSAID'S: CrCl 58 ml/min, No NSAIDs,  was with ABL anemia, also on steroids  Steroids:  Daily Cortef  Muscle Relaxants: methocarbamol 500 mg po q 6 h prn  Adjuvants: Tylenol 650 mg po tid  Antidepressants/anxiolytics: Lexapro has not been started.  Opioids:  discontinue scheduled oxycodone,  oxycodone 5-10 mg po q 4 h prn  IV Pain medication: DC   3)Non-medication interventions:  Ice vs heat   Acupuncture consult -  If agreeable   Integrative consult - if agreeable  4)Constipation Prophylaxis:  Prn ordered   5) Care Teams: HMS, GI signed off    -Opioid prescriber has been -small Rx for [place] 5/325 from [employer] [facility]  -[place]  pulled from system on 12/29/22. This indicates only recent opioid use.  12/27/22 Norco 5/325 #10 post op ERCP, lap carmelita   Discharge Recommendations - We recommend prescribing the following at the time of discharge: Most likely oxycodone.     History of Present Illness (HPI):       Ifrah Huitron is a 72 year old male who presented for abdominal pain, post lap carmelita, and ERCP.  Past medical history as above. The pain is reported to be acute pain, located in the left low back, and it does radiate to LLQ.  Current pain is rated at 2-6/10 and goal is 2/10.      Per [place]  review, the patient does not have an opioid tolerance. Opioid induced side effects noted and include:none    Reviewed medical record, labs, imaging, ED note, and care everywhere.     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     Thyroid disease     removed pituitary gland       PAST SURGICAL HISTORY:   Past Surgical History:   Procedure Laterality Date     COLONOSCOPY N/A 12/17/2020    Procedure: COLONOSCOPY;   Surgeon: Ken Camacho MD;  Location: [facility]     ENDOSCOPIC RETROGRADE CHOLANGIOPANCREATOGRAM N/A 12/23/2022    Procedure: ENDOSCOPIC RETROGRADE CHOLANGIOPANCREATOGRAPHY;  Surgeon: Jim Lamar MD;  Location: [facility] OR     ENT SURGERY       ESOPHAGOSCOPY, GASTROSCOPY, DUODENOSCOPY (EGD), COMBINED N/A 12/27/2022    Procedure: ESOPHAGOGASTRODUODENOSCOPY (EGD);  Surgeon: Brenton Francois MD;  Location: [facility] GI     HERNIA REPAIR       INSERT PORT VASCULAR ACCESS Right 1/28/2022    Procedure: INSERTION, VASCULAR ACCESS PORT;  Surgeon: Daniel Kinney MD;  [place] [facility] [place]     IR CHEST PORT PLACEMENT > 5 YRS OF AGE  1/28/2022     LAPAROSCOPIC CHOLECYSTECTOMY N/A 12/24/2022    Procedure: CHOLECYSTECTOMY, LAPAROSCOPIC;  Surgeon: Froy More DO;  Location: [facility] OR     LARYNGOSCOPY, EXCISE VOCAL CORD LESION MICROSCOPIC, COMBINED Left 07/01/2021    Procedure: MICROLARYNGOSCOPY, LEFT TRUE VOCAL CORD INJECTION WITH PROLARYN;  Surgeon: Kyree Bearden MD;  Location: [facility] OR     PHACOEMULSIFICATION WITH STANDARD INTRAOCULAR LENS IMPLANT Right 03/10/2021    Procedure: Cataract removal with implant.;  Surgeon: Jamir Mac MD;  Location: [facility]     PHACOEMULSIFICATION WITH STANDARD INTRAOCULAR LENS IMPLANT Left 04/05/2021    Procedure: Cataract removal with implant.;  Surgeon: Jamir Mac MD;  Location: [facility] OR     PICC DOUBLE LUMEN PLACEMENT Right 12/01/2021    5FR DL PICC, basilic vein. L-38cm, 1cm out.     PICC DOUBLE LUMEN PLACEMENT Right 01/04/2022    Right cephalic, 41 cm, 1 external length     PITUITARY EXCISION       tooth pulled 4/7  Right        FAMILY HISTORY:   Family History   Problem Relation Age of Onset     Lupus Mother      ALS Father      Rheumatoid Arthritis Sister        SOCIAL HISTORY:   Social History     Tobacco Use     Smoking status: Never     Smokeless tobacco: Never   Substance Use Topics     Alcohol use: Yes     Comment: rare        HEALTH  & LIFESTYLE PRACTICES  Tobacco:  reports that he has never smoked. He has never used smokeless tobacco.  Alcohol:  reports current alcohol use.  Illicit drugs:  reports no history of drug use.    Allergies  Allergies   Allergen Reactions     Penicillins Hives and Swelling     Occurred as small child   Pt tolerated meropenem 12/23/22         Problem List  Patient Active Problem List    Diagnosis Date Noted     Sepsis (H) 12/26/2022     Priority: Medium     Postoperative anemia due to acute blood loss 12/26/2022     Priority: Medium     Stage 3a chronic kidney disease (H) 12/26/2022     Priority: Medium     Elevated LFTs 12/22/2022     Priority: Medium     Acute renal failure, unspecified acute renal failure type (H) 11/25/2022     Priority: Medium     Secondary adrenal insufficiency (H) 04/25/2022     Priority: Medium     History of pituitary surgery 04/25/2022     Priority: Medium     Anemia in neoplastic disease 02/14/2022     Priority: Medium     Thrush 01/11/2022     Priority: Medium     Encounter for other specified aftercare 12/07/2021     Priority: Medium     Sarcoma (H) 12/01/2021     Priority: Medium     Chemotherapy-induced neutropenia (H) 11/04/2021     Priority: Medium     Chemotherapy-induced nausea 11/02/2021     Priority: Medium     Hypophosphatemia 11/02/2021     Priority: Medium     Anemia 11/02/2021     Priority: Medium     Vocal fold paralysis, left 06/21/2021     Priority: Medium     Added automatically from request for surgery [id number]       Dysphonia 06/21/2021     Priority: Medium     Added automatically from request for surgery [id number]       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     Priority: Medium     Added automatically from request for surgery [id number]       Mediastinal lymphadenopathy 06/21/2021     Priority: Medium     Added automatically from request for surgery [id number]       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     Priority: Medium     Mesothelioma, malignant (H) 03/26/2021     Priority:  Medium     TUYET (generalized anxiety disorder) 05/23/2017     Priority: Medium     Degeneration of lumbar or lumbosacral intervertebral disc 01/04/2016     Priority: Medium     Osteoarthritis of spine with radiculopathy, lumbar region 01/04/2016     Priority: Medium     Epidural lipomatosis 12/03/2015     Priority: Medium     Chronic lower back pain 12/03/2015     Priority: Medium     Hypopituitarism (H) 08/25/2010     Priority: Medium     Central hypothyroidism 08/16/2010     Priority: Medium     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 Thyroid started 4/10  Low cortisol [1] 5/7/10 so secondary adrenal insufficiency  hypophysectomy 8/23/10    Formatting of this note might be different from the original.  1.9 cm  Formatting of this note might be different from the original.  Macroadenoma of 1.9 cm in largest dimension noted 4/10  Likely central thyroid and HGH deficiency. Thyroid started 4/10  Low cortisol [1] 5/7/10 so secondary adrenal insufficiency  hypophysectomy 8/23/10       Eczema 01/12/2009     Priority: Medium     Calculus of kidney 09/09/2004     Priority: Medium     Rosacea 09/09/2004     Priority: Medium     Inguinal hernia 09/09/2004     Priority: Medium     Formatting of this note might be different from the original.  Epic         Prior to Admission Medications   Medications Prior to Admission   Medication Sig Dispense Refill Last Dose     acetaminophen (TYLENOL) 325 MG tablet Take 2 tablets (650 mg) by mouth every 6 hours as needed for mild pain or other (and adjunct with moderate or severe pain or per patient request)   Unknown     acetaminophen (TYLENOL) 500 MG tablet Take 1,000 mg by mouth every 8 hours as needed for mild pain   12/25/2022     amLODIPine (NORVASC) 5 MG tablet Take 1 tablet (5 mg) by mouth daily 30 tablet 3 12/26/2022     aspirin-acetaminophen-caffeine  (EXCEDRIN MIGRAINE) 250-250-65 MG tablet Take 1 tablet by mouth daily as needed for headaches   Unknown     Aspirin-Caffeine (JUAN A BACK & BODY PO) Take 2 tablets by mouth 2 times daily as needed   Unknown     celecoxib (CELEBREX) 200 MG capsule Take 1 capsule (200 mg) by mouth 2 times daily 60 capsule 3 12/26/2022     doxepin (SINEQUAN) 10 MG/ML (HIGH CONC) solution Take 2.5 mLs (25 mg) by mouth 2 times daily as needed (rinse for mucositis) Dilute the 2.5 mL of doxepin to 5 ml total in sterile or distilled water. 118 mL 0 Past Month     escitalopram (LEXAPRO) 10 MG tablet Take 1 tablet (10 mg) by mouth daily 90 tablet 3 12/26/2022     guaiFENesin-codeine (GUAIFENESIN AC) 100-10 MG/5ML syrup Take 10 mLs by mouth every 4 hours as needed for cough 118 mL 0 Unknown     HYDROcodone-acetaminophen (NORCO) 5-325 MG tablet Take 1 tablet by mouth every 6 hours as needed for moderate pain (4-6) 10 tablet 0 12/26/2022     hydrocortisone (CORTEF) 10 MG tablet Take 20 mg in the morning and 10 mg in the afternoon 270 tablet 3 12/26/2022     levothyroxine (SYNTHROID/LEVOTHROID) 75 MCG tablet Take 1 tablet (75 mcg) by mouth every morning 90 tablet 3 12/26/2022     Menthol-Methyl Salicylate (DALIA MALIK GREASELESS) cream Apply topically every 6 hours as needed   Unknown     nystatin (MYCOSTATIN) 642393 UNIT/ML suspension Swish and spit 500,000 Units in mouth 4 times daily   Past Week     omeprazole (PRILOSEC) 20 MG DR capsule Take 40 mg by mouth daily 60 capsule 1 12/25/2022     ondansetron (ZOFRAN) 4 MG tablet Take 1-2 tablets (4-8 mg) by mouth every 8 hours as needed for nausea 60 tablet 3 Unknown     phosphorus tablet 250 mg (PHOSPHA 250 NEUTRAL) 250 MG per tablet Take 1 tablet (250 mg) by mouth 2 times daily (Patient taking differently: Take 250 mg by mouth daily) 60 tablet 3 Past Week     potassium chloride ER (KLOR-CON M) 20 MEQ CR tablet Take 1 tablet (20 mEq) by mouth daily (Patient taking differently: Take 20 mEq by mouth 2  "times daily) 90 tablet 3 Past Week     prochlorperazine (COMPAZINE) 5 MG tablet Take 1 tablet (5 mg) by mouth every 6 hours as needed for nausea or vomiting . Caution: causes sedation. 30 tablet 1 Unknown     senna-docusate (SENOKOT-S/PERICOLACE) 8.6-50 MG tablet Take 1 tablet by mouth 2 times daily as needed for constipation 30 tablet 1 Past Week     SUMAtriptan (IMITREX) 50 MG tablet Take 1 tablet (50 mg) by mouth at onset of headache for migraine May repeat in 2 hours. Max 4 tablets/24 hours. 10 tablet 3 Unknown     triamcinolone (KENALOG) 0.1 % external cream Apply topically 2 times daily to bothersome skin areas. (Patient taking differently: Apply topically 2 times daily as needed to bothersome skin areas.) 30 g 3 Unknown     Insulin Syringe-Needle U-100 27.5G X 5/8\" 2 ML MISC 1 each daily as needed (with solucortef for adrenal crisis) 10 each 1        Review of Systems  Complete ROS reviewed, unless noted in HPI, all other systems reviewed (with patient) and all others found to be negative.      Objective:     Physical Exam:  /76 (BP Location: Right arm)   Pulse 95   Temp 98.3  F (36.8  C) (Oral)   Resp 18   Ht 1.702 m (5' 7\")   Wt 65 kg (143 lb 6.4 oz)   SpO2 97%   BMI 22.46 kg/m    Weight:   Vitals:    12/27/22 1237   Weight: 65 kg (143 lb 6.4 oz)      Body mass index is 22.46 kg/m .    General Appearance:  Alert, cooperative, no distress   Head:  Normocephalic, without obvious abnormality, atraumatic   Eyes:  PERRL, conjunctiva/corneas clear, EOM's intact   ENT/Throat: Lips, mucosa, and tongue normal; teeth and gums normal   Lymph/Neck: Supple, symmetrical, trachea midline   Lungs:   Clear to auscultation bilaterally, respirations unlabored   Chest Wall:  No tenderness or deformity   Cardiovascular/Heart:  Regular rate and rhythm, S1, S2 normal,no murmur, rub or gallop.    Abdomen:   Soft, non-tender, bowel sounds active all four quadrants,  Lap sites CDI, bruising noted    Musculoskeletal: " Extremities normal, atraumatic   Skin: Skin warm, dry    Neurologic: Alert and oriented X 3, Moves all 4 extremities     Psych: Affect is appropriate      Imaging: Reviewed I have personally reviewed pertinent labs, tests, and radiologic imaging in patient's chart.  CT Chest/Abdomen/Pelvis w Contrast    Result Date: 12/22/2022  CT CHEST/ABDOMEN/PELVIS WITH CONTRAST 12/22/2022 2:13 PM CLINICAL HISTORY: Near syncopal spell. Fever, weakness, elevated liver enzymes. 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 Last imaging 11/7/2022. Evaluate for interval change. TECHNIQUE: CT scan of the chest, abdomen, and pelvis was performed following injection of IV contrast. Multiplanar reformats were obtained. Dose reduction techniques were used. CONTRAST: 68 mL Isovue 370 COMPARISON: CT chest, abdomen and pelvis 11/7/2022. FINDINGS: LUNGS AND PLEURA: No effusions. No pneumothorax. New indeterminant 0.5 cm pulmonary nodule medial right lower lobe series 3 image 184. New subpleural right upper lobe lateral nodule measuring 0.2 cm, image 85. There are multiple additional examples of new small nodules bilaterally, some with a groundglass character, for example, image 126 and others appearing small and solid clustered together, image 165. MEDIASTINUM/AXILLAE: Stable mass at the anterior mediastinum and medial left upper lobe region that is 3.7 x 2.7 cm series 2 image 62. Right chest Port-A-Cath. Stable small mediastinal lymph nodes. No acute mediastinal abnormality. Stable small nodule at the anterior left pericardial fat measuring 0.7 cm image 85. CORONARY ARTERY CALCIFICATION: Mild. HEPATOBILIARY: Several small hypodensities in the liver are stable. Some are suggestive of cysts, but others are too small for characterization. No new liver lesion identified. Gallbladder shows no acute abnormality. PANCREAS: Normal. SPLEEN: Lobulated splenic mass is slightly larger  measuring 9.3 x 8.4 cm, previously 8.9 x 7.9 cm image 129. This contacts the posterior left hemidiaphragm. A portion of this also contacts the upper left kidney. ADRENAL GLANDS: Normal. KIDNEYS/BLADDER: Stable bilateral renal cysts without specific imaging follow-up recommended. No hydronephrosis or stone. Bladder is unremarkable. BOWEL: Colonic diverticula. No acute bowel abnormality. Normal appendix. PELVIC ORGANS: Prostate is 4.9 cm. ADDITIONAL FINDINGS: No new adenopathy identified. MUSCULOSKELETAL: Stable small sclerotic foci within the bilateral pelvis, right femoral neck, left femoral head. No new bone lesion identified.     IMPRESSION: 1.  Several bilateral new pulmonary nodules identified. These are indeterminant. New pulmonary metastatic disease is a possibility, though this could be infectious or inflammatory nodules. As such, recommend short interval follow-up CT chest in three months. 2.  Slightly larger mass involving the spleen and contacting the adjacent left posterior hemidiaphragm and left upper kidney. 3.  Stable size of an anterior mediastinal mass. 4.  Small hypodensities in the liver are stable. Many of these appear to represent cysts, but others are too small for characterization. AMBER JASON MD   SYSTEM ID:  [id number]    US Renal Complete Non-Vascular    Result Date: 12/27/2022  EXAM: US RENAL COMPLETE NON-VASCULAR LOCATION: [facility] DATE/TIME: 12/27/2022 5:50 PM INDICATION: left flank pain, eval for hydro COMPARISON: CT 12/26/2022 TECHNIQUE: Routine Bilateral Renal and Bladder Ultrasound. FINDINGS: RIGHT KIDNEY: 10.8 x 5.2 x 5.5 cm. Simple upper pole cyst measuring 1.0 cm. No follow-up is indicated. No hydronephrosis or shadowing stone. LEFT KIDNEY: 10.9 x 4.9 x 4.8 cm. Simple mid to upper pole cortical cysts with the larger measuring 1.3 cm. No hydronephrosis or shadowing stone. BLADDER: Normal.     IMPRESSION: 1.  No evidence of hydronephrosis. 2.  Small simple  renal cortical cysts. No follow-up is indicated.    US Abdomen Limited    Result Date: 11/30/2022  ULTRASOUND ABDOMEN LIMITED 11/30/2022 12:12 PM CLINICAL HISTORY: Acute hyperbilirubinemia, assess for biliary dilation. Hyperbilirubinemia. Spindle cell sarcoma (H). TECHNIQUE: Limited abdominal ultrasound. COMPARISON: 11/7/2022, 10/17/2022 FINDINGS: GALLBLADDER: Echogenic sludge is seen in the gallbladder lumen. No shadowing gallstones. No gallbladder wall thickening or pericholecystic fluid. The ultrasonographer reported a negative sonographic Paez sign. BILE DUCTS: There is no biliary dilatation. The common duct measures 10 mm. LIVER: Normal where seen. RIGHT KIDNEY: No hydronephrosis. There is a simple-appearing cyst lateral aspect of the lower pole measuring 14 x 12 x 8 mm. No follow-up is necessary. PANCREAS: The visualized portions of the pancreas are normal. No ascites.     IMPRESSION: 1.  Gallbladder sludge without signs of acute cholecystitis. 2.  Nonspecific dilation of the common bile duct. Consider correlation with biliary function studies and follow-up MRCP if clinically indicated. 3.  Simple-appearing right renal cyst. No follow-up is necessary. ROBIN SLATER MD   SYSTEM ID:  [id number]    US Upper Extremity Venous Duplex Right    Result Date: 11/30/2022  US UPPER EXTREMITY VENOUS DUPLEX RIGHT 11/30/2022 12:08 PM CLINICAL HISTORY: Rule out DVT; Swelling of right hand. TECHNIQUE: Venous Duplex ultrasound of the right upper extremity with (when possible) and without compression, augmentation, and duplex. Color flow and spectral Doppler with waveform analysis performed. COMPARISON: None. FINDINGS: Ultrasound includes evaluation of the internal jugular vein, innominate vein, subclavian vein, axillary vein, and brachial vein. The superficial cephalic and basilic veins were also evaluated where seen. RIGHT: No deep venous thrombosis. There is noncompressibility throughout the cephalic vein with loss of  internal color Doppler flow, consistent with occlusive superficial thrombophlebitis. The basilic vein is completely compressible with normal waveforms.     IMPRESSION: 1.  Positive for superficial thrombophlebitis of the cephalic vein. 2.  No deep venous thrombosis in the right upper extremity. ROBIN SLATER MD   SYSTEM ID:  [id number]    Abdomen MRI w & w/o contrast mrcp    Result Date: 12/22/2022  EXAM: MR ABDOMEN MRCP W/O and W CONTRAST LOCATION: [facility] DATE/TIME: 12/22/2022 5:49 PM INDICATION: Elevated liver enzymes. Abnormal ultrasound on 11/30/2022. Fever, history of metastatic spindle cell carcinoma. Evaluate for acute hepatobiliary process. COMPARISON: CT of chest, abdomen and pelvis 12/22/2022. Ultrasound abdomen 11/30/2022. TECHNIQUE: Routine MR liver/pancreas protocol including axial and coronal MRCP sequences. 2D and 3D reconstruction performed by MR technologist including MIP reconstruction and slab cholangiograms. If performed with contrast, additional dynamic T1 post IV contrast images. CONTRAST: 7 mL Gadavist. FINDINGS: MRCP: Gallbladder sludge identified. No convincing acute inflammatory change of the gallbladder wall on the supplied MRI images. There is mild biliary ductal dilatation diffusely. Common bile duct is 9 mm. There is small ill-defined low signal within the  distal common bile duct for example series 3, image 19 and on series 4, image 20. Also see series 6, image 10. LIVER: There are multiple right and left hepatic simple cysts that are very small in size without worrisome features. No worrisome hepatic lesion identified. PANCREAS: No solid pancreas lesion. The T2-weighted images show a few tiny cystic pancreas lesions without worrisome features. One of these at the uncinate measures 0.3 cm series 3, image 23. Another at the pancreas tail measures 0.3 cm series 3, image 25. No main pancreatic duct dilatation. ADDITIONAL FINDINGS: Large lobulated mass  occupying the spleen again noted and described on the comparison CT. It measures approximately 9 x 8.1 cm and contacts the posterior left hemidiaphragm, and contacts the upper left kidney. Multiple bilateral renal cysts appear simple without specific imaging follow-up recommended. No hydronephrosis. Normal adrenals.     IMPRESSION: 1.  Mild biliary ductal dilatation. Small region of low signal at the distal common bile duct could represent gallbladder sludge extending to this region. There is gallbladder sludge noted within the gallbladder lumen as well. The common bile duct measures 9 mm. 2.  A few tiny cystic lesions at the pancreas suggesting small cystic neoplasms. One year follow-up MRI is recommended for surveillance. No pancreas duct dilatation. 3.  Lobulated mass at the spleen consistent with malignancy again identified. 4.  Several small simple hepatic cysts without worrisome features.     CT Abdomen Pelvis w Contrast    Result Date: 12/26/2022  CT ABDOMEN AND PELVIS WITH CONTRAST 12/26/2022 3:18 PM CLINICAL HISTORY: Left upper quadrant pain. Recent laparoscopic cholecystectomy. 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 TECHNIQUE: CT scan of the abdomen and pelvis was performed following injection of IV contrast. Multiplanar reformats were obtained. Dose reduction techniques were used. CONTRAST: 61mL of Isovue 370 COMPARISON: CT of the chest, abdomen, and pelvis performed 12/22/2022. FINDINGS: LOWER CHEST: The visualized lung bases are clear. Small hiatal hernia. HEPATOBILIARY: Several small indeterminate hypodensities in the liver are too small to characterize, but are unchanged. Interval postoperative changes of cholecystectomy. Pneumobilia is new since the previous exam, and may be related to a recent ERCP. PANCREAS: Normal. SPLEEN: Hypoenhancing mass within the spleen superiorly and posteriorly has not significantly changed, measured at 9.1 x 8.7 cm. This mass again abuts  the left hemidiaphragm posteriorly as well as the upper pole of the left kidney. ADRENAL GLANDS: Normal. KIDNEYS/BLADDER: Several bilateral renal cysts are unchanged, and would require no specific follow-up. The kidneys are otherwise unremarkable. No hydronephrosis. BOWEL: No bowel obstruction. Scattered sigmoid diverticulosis. No convincing evidence for colitis or diverticulitis. Unremarkable appendix. PELVIC ORGANS: Mild prostatic enlargement. Small amount of nonspecific free fluid in the pelvis, new since the previous exam. LYMPH NODES: No enlarged lymph nodes are identified in the abdomen or pelvis. VASCULATURE: Mild luminal narrowing in the proximal SMA is not significantly changed (series 4 image 42). ADDITIONAL FINDINGS: Free intraperitoneal air anteriorly is likely related to the recent laparoscopic cholecystectomy. MUSCULOSKELETAL: No destructive bony lesions are identified.     IMPRESSION: 1.  Hypoenhancing splenic mass has not significantly changed, measuring 9.1 cm, and is again noted to abut the left hemidiaphragm and upper left kidney. 2.  Interval postoperative changes of laparoscopic cholecystectomy are noted. Free intraperitoneal air is also likely postoperative. 3.  Pneumobilia is new since the previous exam, and likely related to a recent ERCP. 4.  Small amount of nonspecific free fluid in the pelvis is new since the previous exam. 5.  Mild luminal narrowing in the proximal SMA is unchanged, and is of uncertain clinical significance. LELIA RODRIGUEZ MD   SYSTEM ID:  [id number]    XR Surgery LO Fluoro G/T 5 Min w Stills    Result Date: 12/23/2022  EXAM: XR SURGERY LO FLUORO GREATER THAN 5 MIN W STILLS LOCATION: [facility] DATE/TIME: 12/23/2022 2:54 PM INDICATION: intra op COMPARISON: MRCP 12/22/2022 TECHNIQUE: Exam performed by gastroenterologist. FLUOROSCOPIC TIME: 2.8 minutes NUMBER OF IMAGES: 7 FINDINGS: BILE DUCTS: Diffusely dilated up to 1.5 cm. A rounded filling  defect in the distal common bile duct. PANCREATIC DUCT: Not imaged.     IMPRESSION: Biliary dilation with filling defect in the distal common bile duct, which was reportedly removed. Refer to ERCP report for full details.      Labs: Reviewed I have personally reviewed pertinent labs, tests, and radiologic imaging in patient's chart.  Recent Results (from the past 24 hour(s))   Potassium    Collection Time: 12/28/22  2:52 PM   Result Value Ref Range    Potassium 3.4 3.4 - 5.3 mmol/L   Potassium    Collection Time: 12/28/22  9:38 PM   Result Value Ref Range    Potassium 3.5 3.4 - 5.3 mmol/L   Magnesium    Collection Time: 12/29/22  5:42 AM   Result Value Ref Range    Magnesium 2.0 1.7 - 2.3 mg/dL   Lipase    Collection Time: 12/29/22  5:42 AM   Result Value Ref Range    Lipase 10 (L) 13 - 60 U/L   Potassium    Collection Time: 12/29/22  5:42 AM   Result Value Ref Range    Potassium 3.8 3.4 - 5.3 mmol/L   CBC with platelets    Collection Time: 12/29/22  9:20 AM   Result Value Ref Range    WBC Count 16.3 (H) 4.0 - 11.0 10e3/uL    RBC Count 2.72 (L) 4.40 - 5.90 10e6/uL    Hemoglobin 8.2 (L) 13.3 - 17.7 g/dL    Hematocrit 26.3 (L) 40.0 - 53.0 %    MCV 97 78 - 100 fL    MCH 30.1 26.5 - 33.0 pg    MCHC 31.2 (L) 31.5 - 36.5 g/dL    RDW 18.2 (H) 10.0 - 15.0 %    Platelet Count 129 (L) 150 - 450 10e3/uL       Total time spent 80 minutes with greater than 50% in consultation, education and coordination of care.     Also discussed with RN, pharmacist.     Thank you for this consultation.    DEION Harp-C  Acute Care Pain Management Program  [facility] ([facility], [place], [place])  Monday-Friday 8a-4p   Page via online paging system or call [phone]

## 2022-12-29 NOTE — PLAN OF CARE
"Goal Outcome Evaluation:      Plan of Care Reviewed With: patient    Overall Patient Progress: improvingOverall Patient Progress: improving    Outcome Evaluation: Patient tolerated his full liquids in evening and has been instructed to try eating a regular diet for breakfast. States he had a trace of nausea intermittently but no emesis. Voiding spontaneously. Passing gas. Requesting pain medication between Q6H dose of scheduled oxy 2.5mg. States the Tylenol didn't do anything for him. Sleeping between cares.    BP (!) 152/80 (BP Location: Right arm)   Pulse 95   Temp 98.3  F (36.8  C) (Oral)   Resp 20   Ht 1.702 m (5' 7\")   Wt 65 kg (143 lb 6.4 oz)   SpO2 96%   BMI 22.46 kg/m      Donnie Aldridge RN        "

## 2022-12-30 NOTE — PROGRESS NOTES
Care Management Discharge Note    Discharge Date: 12/30/2022       Discharge Disposition: Home    Discharge Services:      Discharge DME:      Discharge Transportation:      Private pay costs discussed: Not applicable    PAS Confirmation Code:    Patient/family educated on Medicare website which has current facility and service quality ratings:      Education Provided on the Discharge Plan:    Persons Notified of Discharge Plans: Patient - per team  Patient/Family in Agreement with the Plan: yes    Handoff Referral Completed: Yes    Additional Information:  Patient to discharge home with family, family to transport at time of discharge. No CM needs identified.     Inez Woodson RN

## 2022-12-30 NOTE — DISCHARGE INSTRUCTIONS
Pain Team Instructions   - You were prescribed Oxycodone and Robaxin for pain.   - Acetaminophen (Tylenol) may be used as needed for pain. Do not exceed more than 3000 mg of Tylenol per day in a 24 hour period. Many prescription pain medications contain Tylenol  (acetaminophen), including Vicodin , Tylenol #3 , Norco , Lortab , and Percocet .  You should not take any extra pills of Tylenol  if you are using these prescription medications or you can get very sick.    - Lidocaine ointment may be used up to 3-4 times daily on painful areas. If you do not want to use an ointment, lidocaine patches could be an alternative. You may wear up to 1-3 patches at a time. You should only use one lidocaine product, either a patch or an ointment, not both. Do not place lidocaine on red or open skin or over a healing incision. Avoid heat over lidocaine due to risk of burns. Patches may be used and on for 12 hours and then remove and have off for 12 hours. Lidocaine can be purchased over the counter. Aspercreme or Salon Pas are common Lidocaine brands over the counter.     - Store your medications in a safe and secure place.   - Only take medications prescribed to you and only take them as directed by your doctor. Taking more than the prescribed amount increases your risk for respiratory depression or death.  - Dispose of any unused medications in your home.  Over-the-counter medications and prescription drugs can pollute [place] and be harmful to humans, fish, and other wildlife when disposed of improperly -- do not flush medications down the toilet or place in the trash.  Properly disposing of medicines is important to prevent abuse or poisoning and protect the environment. Prescription and over-the-counter medications are collected anonymously from residents for free at [facility] drop-off locations usually located at your local police department.   - Opioid pain medications can cause addiction. If you have a history of chemical  dependency of any type, you are at a higher risk of becoming addicted to pain medications.  Only take these prescribed medications to treat your pain when all other options have been tried. Take it for as short a time and as few doses as possible. Store your pain pills in a secure place, as they are frequently stolen and provide a dangerous opportunity for children or visitors in your house to start abusing these powerful medications. We will not replace any lost or stolen medicine.   - As soon as your pain is better, you should seek out a drug take back program (see your local police department) to dispose of your extra medication.   - You have been given a prescription for an opioid (narcotic) pain medicine and/or have received a pain medicine. These medicines can make you drowsy or impaired. You must not drive, operate dangerous equipment, or engage in any other dangerous activities such as drinking alcohol or using illicit drugs while taking these medications. If you drive while taking these medications, you could be arrested for DUI, or driving under the influence. Do not drink any alcohol while you are taking these medications.   - Opioids can have side effects of nausea, vomiting, constipation. Take your medication with food.   - All opioids tend to cause constipation. Drink plenty of water and eat foods that have a lot of fiber, such as fruits, vegetables, prune juice, apple juice and high fiber cereal.  Take a laxative if you don't move your bowels at least every other day. Miralax , Milk of Magnesia, Colace , or Senna  can be used to keep you regular.  You will likely need to continue stool softeners and stimulants while taking opioids. Call your Primary Care Provider if it has been greater than 3 days since your last bowel movement.  - Follow up with Primary Care Provider

## 2022-12-30 NOTE — PLAN OF CARE
"Goal Outcome Evaluation:      Plan of Care Reviewed With: patient    Overall Patient Progress: improvingOverall Patient Progress: improving    Outcome Evaluation: Patient rates pain around a 2/10. Tried PRN robaxin with scheduled Tylenol and lidocaine gel tonight for pain management, well tolerated. VSS with elevated BP, stable. Independent to the bathroom, passing gas. Denied nausea with food today, ate 100% of dinner including chicken without issue.    BP (!) 160/86 (BP Location: Right arm, Patient Position: Fowlers, Cuff Size: Adult Regular)   Pulse 91   Temp 97.6  F (36.4  C) (Oral)   Resp 17   Ht 1.702 m (5' 7\")   Wt 65 kg (143 lb 6.4 oz)   SpO2 98%   BMI 22.46 kg/m      Donnie Aldridge RN    "

## 2022-12-30 NOTE — PROGRESS NOTES
[facility] ACUTE PAIN SERVICE    Daily PAIN Progress Note    Assessment/Plan:  Ifrah Huitron is a 72 year old male who was admitted on 12/26/2022.  Pain team was asked to see the patient for evaluation of left sided abdominal pain after ERCP on 12/23 and lap carmelita on 12/24 and cancer pain. History of metastatic spindle cell carcinoma, malignant mesothelioma, mediastinal lymphadenopathy, GERD, renal stones, choledocholithiasis s/p ERCP on 12/23/22, and lap carmelita on 12/24 and was discharged 12/25/22.  Presented back to hospital 12/26/22 with left sided abdominal pain, left low back pain, nausea, weakness, fevers, chills, poor appetite. The patient does not smoke and denies chemical dependency history.      Post op day: Patient s/p lap carmelita on 12/24, s/p ERCP on 12/23     Opioid Induced Respiratory Depression Risk Assessment:  Moderate: age, concomitant CNS depressants?     PLAN:   1) Pain is consistent with cancer pain, post op pain. Labs and imaging indicated: I have personally reviewed pertinent labs, tests, and radiologic imaging in patient's chart. Treatment plan includes multimodal pain approach, Hospital Medicine Service for medical management, GI. Patient educated regarding multimodal pain approach, medications as listed below. Patient is understanding of the plan. All questions and concerns addressed to patient's satisfaction.   2)Multimodal Medication Therapy  Topical: lidocaine 5% ointment qid  NSAID'S: CrCl 58 ml/min, No NSAIDs, was with ABL anemia, also on steroids  Steroids:  Daily Cortef  Muscle Relaxants: methocarbamol 500 mg po q 6 h prn- will schedule   Adjuvants: Tylenol 650 mg po tid  Antidepressants/anxiolytics: Lexapro has not been started.  Opioids:  oxycodone 5-10 mg po q 4 h prn - change to 5 mg q4h prn   IV Pain medication: DC   3)Non-medication interventions:  Ice vs heat   4)Constipation Prophylaxis: scheduled and prn ordered   5) [place] [facility] [place]     -Opioid prescriber  "has been -small Rx for [place] 5/325 from [facility]  [place]  pulled from system on 12/29/22. This indicates only recent opioid use.  12/27/22 Norco 5/325 #10 post op ERCP, lap carmelita   Discharge Recommendations - We recommend prescribing the following at the time of discharge: Most likely oxycodone 5 mg x8-10 tabs, Robaxin x5-7 days, lidocaine ointment, APAP.    Pain controlled. Pain team will sign off. Scripts complete.     Subjective:  Describes pain as 2-6/10 and spasm, aching in the left low back and wraps around to LLQ abdomen.  Reports pain was controlled until this AM when he woke up and now pain is 6/10 and sharp, aching, spasm. RN in room and just gave Oxycodone 10 mg. The patient reports nausea, constipation. No further concerns.  Patient verbalizes appreciation for this visit.     Principal Problem:    Sepsis (H)  Active Problems:    Hypopituitarism (H)    Central hypothyroidism    Spindle cell sarcoma (H)    Mesothelioma, malignant (H)    Secondary adrenal insufficiency (H)    Elevated LFTs    Postoperative anemia due to acute blood loss    Stage 3a chronic kidney disease (H)      Objective:  Vital signs in last 24 hours:  BP (!) 141/77 (BP Location: Right arm)   Pulse 90   Temp 98.4  F (36.9  C) (Oral)   Resp 18   Ht 1.702 m (5' 7\")   Wt 65 kg (143 lb 6.4 oz)   SpO2 98%   BMI 22.46 kg/m    Weight:   Vitals:    12/27/22 1237   Weight: 65 kg (143 lb 6.4 oz)      Weight change:   Body mass index is 22.46 kg/m .    Intake/Output last 3 shifts:  I/O last 3 completed shifts:  In: 480 [P.O.:480]  Out: -   Intake/Output this shift:  No intake/output data recorded.    Review of Systems:   As per subjective, all others negative.    Physical Exam:  General Appearance:  Alert, cooperative, no distress   Head:  Normocephalic, without obvious abnormality   Eyes:  PERRL, conjunctiva/corneas clear, EOM's intact   Nose: Nares normal, septum midline   Throat: Lips, mucosa, and tongue normal; teeth and gums normal "   Neck: Supple, symmetrical, trachea midline   Back:   Symmetric, no curvature, ROM normal   Lungs:   Clear to auscultation bilaterally, respirations unlabored   Heart:  Regular rate and rhythm, S1, S2 normal    Abdomen:   Soft, non-tender, lap site CDI and CAMPBELL, bruising around lap sites, bowel sounds active all four quadrants   Extremities: Extremities normal, atraumatic, no cyanosis or edema   Skin: Skin warm, dry    Neurologic: Alert and oriented X 3, Moves all 4 extremities     Imaging: Reviewed I have personally reviewed pertinent labs, tests, and radiologic imaging in patient's chart.      Labs: Reviewed I have personally reviewed pertinent labs, tests, and radiologic imaging in patient's chart.  Recent Results (from the past 24 hour(s))   Magnesium    Collection Time: 12/30/22  6:46 AM   Result Value Ref Range    Magnesium 2.1 1.7 - 2.3 mg/dL   Potassium    Collection Time: 12/30/22  6:46 AM   Result Value Ref Range    Potassium 3.6 3.4 - 5.3 mmol/L   CBC with platelets    Collection Time: 12/30/22  6:46 AM   Result Value Ref Range    WBC Count 10.4 4.0 - 11.0 10e3/uL    RBC Count 2.45 (L) 4.40 - 5.90 10e6/uL    Hemoglobin 7.6 (L) 13.3 - 17.7 g/dL    Hematocrit 23.6 (L) 40.0 - 53.0 %    MCV 96 78 - 100 fL    MCH 31.0 26.5 - 33.0 pg    MCHC 32.2 31.5 - 36.5 g/dL    RDW 17.5 (H) 10.0 - 15.0 %    Platelet Count 111 (L) 150 - 450 10e3/uL         Total time spent 30 minutes with greater than 50% in consultation, education and coordination of care.     Also discussed with PETER ESCOTO, ROLLYP-C  Acute Care Pain Management Program  [facility] ([facility], [place], [place])  Monday-Friday 8a-4p   Page via online paging system or call [phone]

## 2022-12-30 NOTE — TELEPHONE ENCOUNTER
Free Drug Application Initiated  Medication: Votrient  Sponsor: NADEEN DOWNEY  Phone #: NADEEN  Sharla [phone] ext [phone]  NPAF  [phone]  Fax #: NADEEN  [phone]  NPAF  [phone]  Additional Information: hard copy not sent in with application, pending signature from md. [facility]  Oncology Pharmacy Liaison   [email or web address]  [phone] (phone  [phone] (fax

## 2022-12-30 NOTE — DISCHARGE SUMMARY
[facility] MEDICINE  DISCHARGE SUMMARY     Primary Care Physician: Sukhdev Kohler  Admission Date: 12/26/2022   Discharge Provider: Arsalan Argueta MD Discharge Date: 12/30/2022   Diet:   Active Diet and Nourishment Order   Procedures     Advance Diet as Tolerated: Regular Diet Adult     Diet       Code Status: Full Code   Activity: DCACTIVITY: Activity as tolerated        Condition at Discharge: Stable     REASON FOR PRESENTATION(See Admission Note for Details)   Sepsis    PRINCIPAL & ACTIVE DISCHARGE DIAGNOSES     Principal Problem:    Sepsis (H)  Active Problems:    Hypopituitarism (H)    Central hypothyroidism    Spindle cell sarcoma (H)    Mesothelioma, malignant (H)    Secondary adrenal insufficiency (H)    Elevated LFTs    Postoperative anemia due to acute blood loss    Stage 3a chronic kidney disease (H)      PENDING LABS     Unresulted Labs Ordered in the Past 30 Days of this Admission     Date and Time Order Name Status Description    12/26/2022  3:09 PM Blood Culture Hand, Right Preliminary     12/26/2022  3:09 PM Blood Culture Portacath Preliminary             PROCEDURES ( this hospitalization only)      Procedure(s):  ESOPHAGOGASTRODUODENOSCOPY (EGD)    RECOMMENDATIONS TO OUTPATIENT PROVIDER FOR F/U VISIT     Follow-up Appointments     Follow-up and recommended labs and tests       Follow-up with primary care clinic within 1 week to recheck hemoglobin   and electrolytes             {Additional follow-up instructions/to-do's for PCP    : Recheck hemoglobin and electrolytes, will need follow-up with his oncology group    DISPOSITION     Home    SUMMARY OF HOSPITAL COURSE:      fIrah Huitron is a 72 year old male  with metastatic spindle cell sarcoma, malignant mesothelioma, mediastinal lymphadenopathy, left vocal fold paralysis. who was admitted on 12/22/2022 as a transfer from [facility]. He was found to have fever, weakness, and an  episode of presyncope 1 day prior to arrival.  He was readmitted on 12/26/2022 for left-sided abdominal pain and acute drop in hemoglobin from 8.7-6.7.  He was previously admitted from 12/22-12/25 for elevated LFTs, choledocholithiasis status post ERCP and lap carmelita.  Still hospitalized secondary to suboptimal pain control.     ABL anemia with melena/Abd pain  - Hgb 6.4 given 1 unit started at [facility]  - Hgb stable now, slowly drifting down but upon chart review consistent with his typical baseline between 7 and 8.  - S/P  ERCP on 12/23 Choledocholithiasis with an obstruction was found.Complete removal was accomplished by biliary  sphincterotomy, dilation and balloon extraction. Indocin pr.   - ERCP viewed esophagus, stomach and Upper GI normal, last colonoscopy in 1220 unremarkable.   - S/P Lap carmelita 12/24  - Surgery and GI consulted   - CT  Hypoenhancing splenic mass has not significantly changed, measuring 9.1 cm, and is again noted to abut the left hemidiaphragm and upper left kidney. Interval postoperative changes of laparoscopic cholecystectomy are  noted. Free intraperitoneal air is also likely postoperative. Pneumobilia is new since the previous exam, and likely related to a recent ERCP. Small amount of nonspecific free fluid in the pelvis is new since the previous exam. Mild luminal narrowing in the proximal SMA is unchanged, and is of uncertain clinical significance.  - Upper endo on 12/27 negative for bleeding from billiary tract  - Surgery feels surgical blood loss unlikely  - stop NSAIDs.       Left flank pain  - this is new, CT reviewed does not show new bleed or advancing mass since previous.  US  L Renal showed no hydronephrosis. to assess  - leukocytosis likely due to cortef, resolved  - start oxycodone for cancer pain  -Pain team has been following  - follow up to restart chemo with outpatient oncology.     Sepsis - resolved  - Leukocytosis resolved  - blood cx - NGTD, urine not C/W infection.  - IV  meropenem stopped.  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  -CT 12/22: Several new pulmonary nodules, slightly larger mass involving the spleen, stable anterior mediastinal mass, repeat CT 12/26 shows same mass no change.  - he should f/u with them as outpatient.      Elevated LFTs stable no real change from previous   - trend labs   - GI and surgery signed off.     Essential hypertension  - NPO   - pain control and Bps still elevated  - Prn IV hydralazine   - restart home amlodipine and add lisinopril  - Stop IVF     Hypopituitarism  Hypothyroidism  - restarting cortef had been on hydrocortisone 10mg BID for now IV due to concern for sepsis.  - cortef at double dosing due to acute illness   - continue levothyroxine      Depression  - hold lexapro     CKD 3 stable  - IVF  - trend labs            Bonner Catheter: Not present  Central Lines: PRESENT     Discharge Medications with Med changes:     Current Discharge Medication List      START taking these medications    Details   lidocaine (XYLOCAINE) 5 % external ointment Apply topically 4 times daily    Associated Diagnoses: Acute post-operative pain; Cancer associated pain; Abdominal pain, generalized      lisinopril (ZESTRIL) 20 MG tablet Take 1 tablet (20 mg) by mouth daily  Qty: 30 tablet, Refills: 0    Associated Diagnoses: Hypertension, unspecified type      methocarbamol (ROBAXIN) 500 MG tablet Take 1 tablet (500 mg) by mouth every 6 hours  Qty: 20 tablet, Refills: 0    Associated Diagnoses: Acute post-operative pain; Cancer associated pain; Abdominal pain, generalized      oxyCODONE (ROXICODONE) 5 MG tablet Take 1 tablet (5 mg) by mouth every 4 hours  as needed for severe pain (7-10) or moderate pain (4-6)  Qty: 10 tablet, Refills: 0    Associated Diagnoses: Acute post-operative pain; Cancer associated pain; Abdominal pain, generalized         CONTINUE these medications which have CHANGED    Details   phosphorus tablet 250 mg (PHOSPHA 250 NEUTRAL) 250 MG per tablet Take 1 tablet (250 mg) by mouth daily    Associated Diagnoses: Hypophosphatemia      triamcinolone (KENALOG) 0.1 % external cream Apply topically 2 times daily as needed to bothersome skin areas.    Associated Diagnoses: Drug rash         CONTINUE these medications which have NOT CHANGED    Details   acetaminophen (TYLENOL) 325 MG tablet Take 2 tablets (650 mg) by mouth every 6 hours as needed for mild pain or other (and adjunct with moderate or severe pain or per patient request)    Associated Diagnoses: Acute cholecystitis      amLODIPine (NORVASC) 5 MG tablet Take 1 tablet (5 mg) by mouth daily  Qty: 30 tablet, Refills: 3    Associated Diagnoses: Benign essential hypertension      aspirin-acetaminophen-caffeine (EXCEDRIN MIGRAINE) 250-250-65 MG tablet Take 1 tablet by mouth daily as needed for headaches      Aspirin-Caffeine (JUAN A BACK & BODY PO) Take 2 tablets by mouth 2 times daily as needed      doxepin (SINEQUAN) 10 MG/ML (HIGH CONC) solution Take 2.5 mLs (25 mg) by mouth 2 times daily as needed (rinse for mucositis) Dilute the 2.5 mL of doxepin to 5 ml total in sterile or distilled water.  Qty: 118 mL, Refills: 0    Associated Diagnoses: Mucositis      guaiFENesin-codeine (GUAIFENESIN AC) 100-10 MG/5ML syrup Take 10 mLs by mouth every 4 hours as needed for cough  Qty: 118 mL, Refills: 0    Associated Diagnoses: Vocal fold paralysis, left; Dysphonia; Muscle tension dysphonia; Mesothelioma, malignant (H); Cough      hydrocortisone (CORTEF) 10 MG tablet Take 20 mg in the morning and 10 mg in the afternoon  Qty: 270 tablet, Refills: 3    Associated Diagnoses: Hypopituitarism (H)      levothyroxine  "(SYNTHROID/LEVOTHROID) 75 MCG tablet Take 1 tablet (75 mcg) by mouth every morning  Qty: 90 tablet, Refills: 3    Associated Diagnoses: Hypopituitarism (H)      Menthol-Methyl Salicylate (DALIA MALIK GREASELESS) cream Apply topically every 6 hours as needed      nystatin (MYCOSTATIN) 530160 UNIT/ML suspension Swish and spit 500,000 Units in mouth 4 times daily      omeprazole (PRILOSEC) 20 MG DR capsule Take 40 mg by mouth daily  Qty: 60 capsule, Refills: 1      ondansetron (ZOFRAN) 4 MG tablet Take 1-2 tablets (4-8 mg) by mouth every 8 hours as needed for nausea  Qty: 60 tablet, Refills: 3    Associated Diagnoses: Chemotherapy-induced nausea      potassium chloride ER (KLOR-CON M) 20 MEQ CR tablet Take 1 tablet (20 mEq) by mouth daily  Qty: 90 tablet, Refills: 3    Associated Diagnoses: Hypokalemia      prochlorperazine (COMPAZINE) 5 MG tablet Take 1 tablet (5 mg) by mouth every 6 hours as needed for nausea or vomiting . Caution: causes sedation.  Qty: 30 tablet, Refills: 1    Associated Diagnoses: Chemotherapy-induced nausea      senna-docusate (SENOKOT-S/PERICOLACE) 8.6-50 MG tablet Take 1 tablet by mouth 2 times daily as needed for constipation  Qty: 30 tablet, Refills: 1    Associated Diagnoses: Acute cholecystitis      SUMAtriptan (IMITREX) 50 MG tablet Take 1 tablet (50 mg) by mouth at onset of headache for migraine May repeat in 2 hours. Max 4 tablets/24 hours.  Qty: 10 tablet, Refills: 3    Associated Diagnoses: Migraine with aura and without status migrainosus, not intractable      Insulin Syringe-Needle U-100 27.5G X 5/8\" 2 ML MISC 1 each daily as needed (with solucortef for adrenal crisis)  Qty: 10 each, Refills: 1    Associated Diagnoses: Adrenal insufficiency (H)         STOP taking these medications       celecoxib (CELEBREX) 200 MG capsule Comments:   Reason for Stopping:         escitalopram (LEXAPRO) 10 MG tablet Comments:   Reason for Stopping:         HYDROcodone-acetaminophen (NORCO) 5-325 MG tablet " Comments:   Reason for Stopping:                     Rationale for medication changes:      See summary        Consults       GASTROENTEROLOGY IP CONSULT  SURGERY GENERAL IP CONSULT  PAIN MANAGEMENT ADULT IP CONSULT    Immunizations given this encounter     Most Recent Immunizations   Administered Date(s) Administered     COVID-19 Vaccine 18+ (Moderna) 07/08/2022     TD (ADULT, 7+) 11/30/2006           Anticoagulation Information      Recent INR results: No results for input(s): INR in the last 168 hours.  Warfarin doses (if applicable) or name of other anticoagulant:       SIGNIFICANT IMAGING FINDINGS     Results for orders placed or performed during the hospital encounter of 12/26/22   US Renal Complete Non-Vascular    Impression    IMPRESSION:  1.  No evidence of hydronephrosis.  2.  Small simple renal cortical cysts. No follow-up is indicated.       SIGNIFICANT LABORATORY FINDINGS     Most Recent 3 CBC's:Recent Labs   Lab Test 12/30/22  0646 12/29/22  0920 12/28/22  0526   WBC 10.4 16.3* 20.5*   HGB 7.6* 8.2* 8.0*   MCV 96 97 96   * 129* 121*           Discharge Orders        Reason for your hospital stay    pain     Activity    Your activity upon discharge: activity as tolerated     Follow-up and recommended labs and tests     Follow-up with primary care clinic within 1 week to recheck hemoglobin and electrolytes     Diet    Follow this diet upon discharge: Orders Placed This Encounter      Advance Diet as Tolerated: Regular Diet Adult       Examination   Physical Exam   Temp:  [97.6  F (36.4  C)-98.4  F (36.9  C)] 98.4  F (36.9  C)  Pulse:  [86-91] 90  Resp:  [16-18] 18  BP: (141-160)/(77-86) 141/77  SpO2:  [97 %-98 %] 98 %  Wt Readings from Last 1 Encounters:   12/27/22 65 kg (143 lb 6.4 oz)       General: No apparent distress, feels much better, pain is very well controlled, he feels comfortable going home.  He is medically stable and feels back to his recent baseline self.  No evidence of active  bleeding.  Lungs: No wheezing  Neurologic: Alert and oriented, no distress, pleasant and conversant      Please see EMR for more detailed significant labs, imaging, consultant notes etc.    I, Arsalan Argueta MD, personally saw the patient today and spent greater than 30 minutes discharging this patient.    Arsalan Argueta MD  [facility]    CC:Sukhdev Kohler

## 2022-12-30 NOTE — ORAL ONC MGMT
"Oral Chemotherapy Monitoring Program    Lab Monitoring Plan  CBC, CMP weekly x4 then q 2 weeks x2 then monthly after starting drug  Mag/phos monthly  T4/TSH baseline, then TSH Q2 Months  Baseline and repeat EKG    Subjective/Objective:  Ifrah Huitron is a 72 year old male contacted by phone for an initial visit for oral chemotherapy education.    ORAL CHEMOTHERAPY 12/30/2022   Assessment Type Initial Work up   Diagnosis Code Sarcoma   Providers Dr. Wolff   Clinic Name/Location [facility]   Drug Name Votrient (pazopanib)   Dose 800 mg   Current Schedule Daily   Cycle Details Continuous       Last PHQ-2 Score on record:   PHQ-2 ( 1999 [employer]) 12/9/2021 3/23/2021   Q1: Little interest or pleasure in doing things 0 0   Q2: Feeling down, depressed or hopeless 0 0   PHQ-2 Score 0 0   PHQ-2 Total Score (12-17 Years)- Positive if 3 or more points; Administer PHQ-A if positive 0 0       Vitals:  BP:   BP Readings from Last 1 Encounters:   12/30/22 (!) 141/77     Wt Readings from Last 1 Encounters:   12/27/22 65 kg (143 lb 6.4 oz)     Estimated body surface area is 1.75 meters squared as calculated from the following:    Height as of 12/27/22: 1.702 m (5' 7\").    Weight as of 12/27/22: 65 kg (143 lb 6.4 oz).    Labs:  _  Result Component Current Result Ref Range   Sodium 139 (12/28/2022) 136 - 145 mmol/L     _  Result Component Current Result Ref Range   Potassium 3.6 (12/30/2022) 3.4 - 5.3 mmol/L     _  Result Component Current Result Ref Range   Calcium 8.6 (L) (12/28/2022) 8.8 - 10.2 mg/dL     _  Result Component Current Result Ref Range   Magnesium 2.1 (12/30/2022) 1.7 - 2.3 mg/dL     _  Result Component Current Result Ref Range   Phosphorus 3.1 (12/22/2022) 2.5 - 4.5 mg/dL     _  Result Component Current Result Ref Range   Albumin 3.2 (L) (12/27/2022) 3.5 - 5.2 g/dL     _  Result Component Current Result Ref Range   Urea Nitrogen 24.2 (H) (12/28/2022) 8.0 - 23.0 mg/dL     _  Result Component Current Result Ref Range "   Creatinine 1.02 (12/28/2022) 0.67 - 1.17 mg/dL     _  Result Component Current Result Ref Range   AST 42 (12/27/2022) 10 - 50 U/L     _  Result Component Current Result Ref Range    (H) (12/27/2022) 10 - 50 U/L     _  Result Component Current Result Ref Range   Bilirubin Total 0.4 (12/27/2022) <=1.2 mg/dL     _  Result Component Current Result Ref Range   WBC Count 10.4 (12/30/2022) 4.0 - 11.0 10e3/uL     _  Result Component Current Result Ref Range   Hemoglobin 7.6 (L) (12/30/2022) 13.3 - 17.7 g/dL     _  Result Component Current Result Ref Range   Platelet Count 111 (L) (12/30/2022) 150 - 450 10e3/uL     _  Result Component Current Result Ref Range   Absolute Neutrophils 15.5 (H) (12/26/2022) 1.6 - 8.3 10e3/uL     _  Result Component Current Result Ref Range   Absolute Neutrophils 5.0 (12/22/2022) 1.6 - 8.3 10e3/uL        Assessment:  Patient is appropriate to start therapy pending baseline EKG and TSH/T4. He will also need to be seen in person by a provider before starting.    Plan:  Basic chemotherapy teaching was reviewed with the patient including indication, start date of therapy, dose, administration, adverse effects, missed doses, food and drug interactions, monitoring, side effect management, office contact information, and safe handling. Written materials were provided and all questions answered.    Patient was instructed to stop omeprazole due to a drug interaction. He is aware to wait until he has been seen in person and we contact him about the EKG and TSH/T4 results before starting the pazopanib.    Pazopanib 14-day supply was sent to [facility] to be filled with a voucher. Free drug application for ongoing use is in process.    Follow-Up:  Ifrah will get baseline EKG and labs before starting pazopanib - Scheduling was contacted to arrange these. We will contact him 7-10 days after the start for an assessment.       Samantha Noe, PharmD, BCPS  Hematology/Oncology Clinical Pharmacist  Oral Chemotherapy  Monitoring Program  [facility]  [phone]

## 2022-12-30 NOTE — PLAN OF CARE
Problem: Pain Acute  Goal: Optimal Pain Control and Function  Outcome: Progressing  Intervention: Prevent or Manage Pain  Recent Flowsheet Documentation  Taken 12/29/2022 1700 by Juan Miguel Cervantes RN  Medication Review/Management: medications reviewed  Taken 12/29/2022 0850 by Juan Miguel Cervantes RN  Medication Review/Management: medications reviewed   Goal Outcome Evaluation:  Pt was given prn oxycodone for 7/10 pain lower back and left hip. He stated relief. Abdominal Incision is c/d/i. BS  present but hypo, per pt he is passing gas, no BM yet.  Up to the bathroom occasionally.

## 2022-12-30 NOTE — PROGRESS NOTES
Patient complained of mild wave of nausea just now, treated with PRN compazine and improving.    Donnie Aldridge RN

## 2023-01-01 ENCOUNTER — VIRTUAL VISIT (OUTPATIENT)
Dept: ONCOLOGY | Facility: CLINIC | Age: 73
End: 2023-01-01
Attending: PHYSICIAN ASSISTANT
Payer: MEDICARE

## 2023-01-01 ENCOUNTER — HOSPITAL ENCOUNTER (OUTPATIENT)
Dept: CARDIOLOGY | Facility: CLINIC | Age: 73
Discharge: HOME | End: 2023-04-04
Attending: PHYSICIAN ASSISTANT | Admitting: PHYSICIAN ASSISTANT
Payer: MEDICARE

## 2023-01-01 ENCOUNTER — LAB (OUTPATIENT)
Dept: INFUSION THERAPY | Facility: CLINIC | Age: 73
End: 2023-01-01
Attending: PHYSICIAN ASSISTANT
Payer: MEDICARE

## 2023-01-01 ENCOUNTER — VIRTUAL VISIT (OUTPATIENT)
Dept: PALLIATIVE CARE | Facility: CLINIC | Age: 73
End: 2023-01-01
Attending: INTERNAL MEDICINE
Payer: MEDICARE

## 2023-01-01 ENCOUNTER — INFUSION THERAPY VISIT (OUTPATIENT)
Dept: INFUSION THERAPY | Facility: CLINIC | Age: 73
End: 2023-01-01
Attending: INTERNAL MEDICINE
Payer: MEDICARE

## 2023-01-01 ENCOUNTER — HOSPITAL ENCOUNTER (OUTPATIENT)
Dept: CT IMAGING | Facility: CLINIC | Age: 73
Discharge: HOME | End: 2023-03-17
Attending: PHYSICIAN ASSISTANT | Admitting: PHYSICIAN ASSISTANT
Payer: MEDICARE

## 2023-01-01 ENCOUNTER — VIRTUAL VISIT (OUTPATIENT)
Dept: ENDOCRINOLOGY | Facility: CLINIC | Age: 73
End: 2023-01-01
Payer: MEDICARE

## 2023-01-01 ENCOUNTER — DOCUMENTATION ONLY (OUTPATIENT)
Dept: RADIATION THERAPY | Facility: OUTPATIENT CENTER | Age: 73
End: 2023-01-01

## 2023-01-01 ENCOUNTER — APPOINTMENT (OUTPATIENT)
Dept: RADIOLOGY | Facility: HOSPITAL | Age: 73
End: 2023-01-01
Attending: EMERGENCY MEDICINE
Payer: MEDICARE

## 2023-01-01 ENCOUNTER — TELEPHONE (OUTPATIENT)
Dept: ONCOLOGY | Facility: CLINIC | Age: 73
End: 2023-01-01
Payer: MEDICARE

## 2023-01-01 ENCOUNTER — TELEPHONE (OUTPATIENT)
Dept: ONCOLOGY | Facility: CLINIC | Age: 73
End: 2023-01-01

## 2023-01-01 ENCOUNTER — APPOINTMENT (OUTPATIENT)
Dept: RADIATION THERAPY | Facility: OUTPATIENT CENTER | Age: 73
End: 2023-01-01
Payer: MEDICARE

## 2023-01-01 ENCOUNTER — HOSPITAL ENCOUNTER (OUTPATIENT)
Dept: MRI IMAGING | Facility: CLINIC | Age: 73
Discharge: HOME | End: 2023-05-10
Attending: PHYSICIAN ASSISTANT
Payer: MEDICARE

## 2023-01-01 ENCOUNTER — OFFICE VISIT (OUTPATIENT)
Dept: RADIATION THERAPY | Facility: OUTPATIENT CENTER | Age: 73
End: 2023-01-01
Payer: MEDICARE

## 2023-01-01 ENCOUNTER — MYC MEDICAL ADVICE (OUTPATIENT)
Dept: ONCOLOGY | Facility: CLINIC | Age: 73
End: 2023-01-01

## 2023-01-01 ENCOUNTER — HOSPITAL ENCOUNTER (EMERGENCY)
Facility: CLINIC | Age: 73
Discharge: HOME | End: 2023-04-19
Attending: EMERGENCY MEDICINE | Admitting: EMERGENCY MEDICINE
Payer: MEDICARE

## 2023-01-01 ENCOUNTER — HOSPITAL ENCOUNTER (OUTPATIENT)
Dept: INFUSION THERAPY | Facility: OTHER | Age: 73
Discharge: HOME | End: 2023-03-09
Attending: INTERNAL MEDICINE
Payer: MEDICARE

## 2023-01-01 ENCOUNTER — APPOINTMENT (OUTPATIENT)
Dept: MRI IMAGING | Facility: CLINIC | Age: 73
End: 2023-01-01
Attending: EMERGENCY MEDICINE
Payer: MEDICARE

## 2023-01-01 ENCOUNTER — APPOINTMENT (OUTPATIENT)
Dept: CT IMAGING | Facility: CLINIC | Age: 73
End: 2023-01-01
Attending: EMERGENCY MEDICINE
Payer: MEDICARE

## 2023-01-01 ENCOUNTER — MYC MEDICAL ADVICE (OUTPATIENT)
Dept: ONCOLOGY | Facility: CLINIC | Age: 73
End: 2023-01-01
Payer: MEDICARE

## 2023-01-01 ENCOUNTER — HOSPITAL ENCOUNTER (OUTPATIENT)
Dept: CT IMAGING | Facility: CLINIC | Age: 73
Discharge: HOME | End: 2023-01-25
Attending: PHYSICIAN ASSISTANT | Admitting: PHYSICIAN ASSISTANT
Payer: MEDICARE

## 2023-01-01 ENCOUNTER — PATIENT OUTREACH (OUTPATIENT)
Dept: ONCOLOGY | Facility: CLINIC | Age: 73
End: 2023-01-01

## 2023-01-01 ENCOUNTER — DOCUMENTATION ONLY (OUTPATIENT)
Dept: ONCOLOGY | Facility: CLINIC | Age: 73
End: 2023-01-01

## 2023-01-01 ENCOUNTER — PATIENT OUTREACH (OUTPATIENT)
Dept: CARE COORDINATION | Facility: CLINIC | Age: 73
End: 2023-01-01

## 2023-01-01 ENCOUNTER — PATIENT OUTREACH (OUTPATIENT)
Dept: OTOLARYNGOLOGY | Facility: CLINIC | Age: 73
End: 2023-01-01
Payer: MEDICARE

## 2023-01-01 ENCOUNTER — HOSPITAL ENCOUNTER (OUTPATIENT)
Dept: CARDIOLOGY | Facility: CLINIC | Age: 73
Discharge: HOME | End: 2023-01-12
Attending: NURSE PRACTITIONER | Admitting: NURSE PRACTITIONER
Payer: MEDICARE

## 2023-01-01 ENCOUNTER — HOSPITAL ENCOUNTER (EMERGENCY)
Facility: HOSPITAL | Age: 73
Discharge: HOME | End: 2023-01-10
Attending: EMERGENCY MEDICINE | Admitting: EMERGENCY MEDICINE
Payer: MEDICARE

## 2023-01-01 ENCOUNTER — HOSPITAL ENCOUNTER (OUTPATIENT)
Dept: CARDIOLOGY | Facility: HOSPITAL | Age: 73
Discharge: HOME | End: 2023-01-05
Attending: INTERNAL MEDICINE
Payer: MEDICARE

## 2023-01-01 ENCOUNTER — MYC REFILL (OUTPATIENT)
Dept: ONCOLOGY | Facility: CLINIC | Age: 73
End: 2023-01-01
Payer: MEDICARE

## 2023-01-01 ENCOUNTER — ONCOLOGY VISIT (OUTPATIENT)
Dept: ONCOLOGY | Facility: HOSPITAL | Age: 73
End: 2023-01-01
Payer: MEDICARE

## 2023-01-01 ENCOUNTER — LAB (OUTPATIENT)
Dept: INFUSION THERAPY | Facility: HOSPITAL | Age: 73
End: 2023-01-01
Payer: MEDICARE

## 2023-01-01 ENCOUNTER — HOSPITAL ENCOUNTER (OUTPATIENT)
Dept: CARDIOLOGY | Facility: CLINIC | Age: 73
Discharge: HOME | End: 2023-02-20
Attending: PHYSICIAN ASSISTANT | Admitting: PHYSICIAN ASSISTANT
Payer: MEDICARE

## 2023-01-01 ENCOUNTER — HOSPITAL ENCOUNTER (EMERGENCY)
Facility: HOSPITAL | Age: 73
Discharge: HOME | End: 2023-03-24
Attending: EMERGENCY MEDICINE | Admitting: EMERGENCY MEDICINE
Payer: MEDICARE

## 2023-01-01 ENCOUNTER — VIRTUAL VISIT (OUTPATIENT)
Dept: ONCOLOGY | Facility: CLINIC | Age: 73
End: 2023-01-01
Attending: INTERNAL MEDICINE
Payer: MEDICARE

## 2023-01-01 ENCOUNTER — MYC MEDICAL ADVICE (OUTPATIENT)
Dept: PALLIATIVE CARE | Facility: CLINIC | Age: 73
End: 2023-01-01
Payer: MEDICARE

## 2023-01-01 VITALS
RESPIRATION RATE: 16 BRPM | SYSTOLIC BLOOD PRESSURE: 143 MMHG | OXYGEN SATURATION: 97 % | HEART RATE: 91 BPM | WEIGHT: 139 LBS | DIASTOLIC BLOOD PRESSURE: 89 MMHG | BODY MASS INDEX: 21.77 KG/M2

## 2023-01-01 VITALS — TEMPERATURE: 99 F | SYSTOLIC BLOOD PRESSURE: 155 MMHG | HEART RATE: 73 BPM | DIASTOLIC BLOOD PRESSURE: 91 MMHG

## 2023-01-01 VITALS
BODY MASS INDEX: 22.96 KG/M2 | RESPIRATION RATE: 18 BRPM | SYSTOLIC BLOOD PRESSURE: 146 MMHG | DIASTOLIC BLOOD PRESSURE: 83 MMHG | TEMPERATURE: 98.6 F | OXYGEN SATURATION: 98 % | HEART RATE: 74 BPM | WEIGHT: 146.6 LBS

## 2023-01-01 VITALS — DIASTOLIC BLOOD PRESSURE: 107 MMHG | SYSTOLIC BLOOD PRESSURE: 190 MMHG | TEMPERATURE: 97.5 F

## 2023-01-01 VITALS
BODY MASS INDEX: 22.76 KG/M2 | HEIGHT: 67 IN | DIASTOLIC BLOOD PRESSURE: 81 MMHG | SYSTOLIC BLOOD PRESSURE: 155 MMHG | WEIGHT: 145 LBS

## 2023-01-01 VITALS
WEIGHT: 145 LBS | HEART RATE: 62 BPM | RESPIRATION RATE: 18 BRPM | HEIGHT: 67 IN | TEMPERATURE: 97.9 F | DIASTOLIC BLOOD PRESSURE: 63 MMHG | SYSTOLIC BLOOD PRESSURE: 117 MMHG | OXYGEN SATURATION: 97 % | BODY MASS INDEX: 22.76 KG/M2

## 2023-01-01 VITALS
DIASTOLIC BLOOD PRESSURE: 80 MMHG | SYSTOLIC BLOOD PRESSURE: 139 MMHG | SYSTOLIC BLOOD PRESSURE: 156 MMHG | DIASTOLIC BLOOD PRESSURE: 79 MMHG | RESPIRATION RATE: 18 BRPM | HEART RATE: 85 BPM | TEMPERATURE: 97.8 F

## 2023-01-01 VITALS
RESPIRATION RATE: 16 BRPM | DIASTOLIC BLOOD PRESSURE: 73 MMHG | HEART RATE: 97 BPM | OXYGEN SATURATION: 97 % | WEIGHT: 146.6 LBS | BODY MASS INDEX: 22.96 KG/M2 | SYSTOLIC BLOOD PRESSURE: 148 MMHG

## 2023-01-01 VITALS
HEART RATE: 65 BPM | DIASTOLIC BLOOD PRESSURE: 96 MMHG | RESPIRATION RATE: 18 BRPM | SYSTOLIC BLOOD PRESSURE: 172 MMHG | TEMPERATURE: 97.5 F

## 2023-01-01 VITALS
TEMPERATURE: 98 F | DIASTOLIC BLOOD PRESSURE: 77 MMHG | OXYGEN SATURATION: 98 % | SYSTOLIC BLOOD PRESSURE: 119 MMHG | HEART RATE: 76 BPM

## 2023-01-01 VITALS
SYSTOLIC BLOOD PRESSURE: 169 MMHG | WEIGHT: 130.8 LBS | DIASTOLIC BLOOD PRESSURE: 96 MMHG | TEMPERATURE: 98.6 F | BODY MASS INDEX: 20.49 KG/M2 | HEART RATE: 89 BPM

## 2023-01-01 VITALS
TEMPERATURE: 98 F | WEIGHT: 140 LBS | RESPIRATION RATE: 20 BRPM | SYSTOLIC BLOOD PRESSURE: 141 MMHG | BODY MASS INDEX: 21.93 KG/M2 | OXYGEN SATURATION: 99 % | DIASTOLIC BLOOD PRESSURE: 79 MMHG | HEART RATE: 78 BPM

## 2023-01-01 VITALS
BODY MASS INDEX: 21.97 KG/M2 | HEART RATE: 69 BPM | OXYGEN SATURATION: 97 % | DIASTOLIC BLOOD PRESSURE: 113 MMHG | RESPIRATION RATE: 24 BRPM | TEMPERATURE: 97.7 F | WEIGHT: 140 LBS | SYSTOLIC BLOOD PRESSURE: 173 MMHG | HEIGHT: 67 IN

## 2023-01-01 VITALS
SYSTOLIC BLOOD PRESSURE: 130 MMHG | DIASTOLIC BLOOD PRESSURE: 81 MMHG | WEIGHT: 139.6 LBS | RESPIRATION RATE: 18 BRPM | HEART RATE: 78 BPM | BODY MASS INDEX: 21.86 KG/M2 | OXYGEN SATURATION: 96 %

## 2023-01-01 VITALS — SYSTOLIC BLOOD PRESSURE: 146 MMHG | HEART RATE: 96 BPM | DIASTOLIC BLOOD PRESSURE: 94 MMHG

## 2023-01-01 VITALS
RESPIRATION RATE: 18 BRPM | WEIGHT: 135.5 LBS | BODY MASS INDEX: 21.22 KG/M2 | TEMPERATURE: 98.7 F | DIASTOLIC BLOOD PRESSURE: 76 MMHG | OXYGEN SATURATION: 96 % | HEART RATE: 95 BPM | SYSTOLIC BLOOD PRESSURE: 150 MMHG

## 2023-01-01 VITALS
SYSTOLIC BLOOD PRESSURE: 118 MMHG | HEART RATE: 85 BPM | RESPIRATION RATE: 16 BRPM | DIASTOLIC BLOOD PRESSURE: 77 MMHG | TEMPERATURE: 98.7 F

## 2023-01-01 VITALS — SYSTOLIC BLOOD PRESSURE: 153 MMHG | DIASTOLIC BLOOD PRESSURE: 89 MMHG

## 2023-01-01 VITALS
DIASTOLIC BLOOD PRESSURE: 92 MMHG | RESPIRATION RATE: 16 BRPM | HEART RATE: 69 BPM | SYSTOLIC BLOOD PRESSURE: 178 MMHG | TEMPERATURE: 98.7 F

## 2023-01-01 VITALS
TEMPERATURE: 97.7 F | HEART RATE: 80 BPM | SYSTOLIC BLOOD PRESSURE: 144 MMHG | BODY MASS INDEX: 20.55 KG/M2 | DIASTOLIC BLOOD PRESSURE: 92 MMHG | WEIGHT: 131.2 LBS

## 2023-01-01 VITALS
TEMPERATURE: 97.4 F | SYSTOLIC BLOOD PRESSURE: 183 MMHG | DIASTOLIC BLOOD PRESSURE: 119 MMHG | RESPIRATION RATE: 18 BRPM | HEART RATE: 75 BPM

## 2023-01-01 DIAGNOSIS — C49.9 SARCOMA (H): ICD-10-CM

## 2023-01-01 DIAGNOSIS — R51.9 ACUTE NONINTRACTABLE HEADACHE, UNSPECIFIED HEADACHE TYPE: ICD-10-CM

## 2023-01-01 DIAGNOSIS — R49.0 MUSCLE TENSION DYSPHONIA: ICD-10-CM

## 2023-01-01 DIAGNOSIS — C49.9 SARCOMA (H): Primary | ICD-10-CM

## 2023-01-01 DIAGNOSIS — T45.1X5A CHEMOTHERAPY-INDUCED NAUSEA: ICD-10-CM

## 2023-01-01 DIAGNOSIS — J38.01 VOCAL FOLD PARALYSIS, LEFT: ICD-10-CM

## 2023-01-01 DIAGNOSIS — T45.1X5A CHEMOTHERAPY-INDUCED NEUTROPENIA (H): ICD-10-CM

## 2023-01-01 DIAGNOSIS — Z51.89 ENCOUNTER FOR OTHER SPECIFIED AFTERCARE: Primary | ICD-10-CM

## 2023-01-01 DIAGNOSIS — Z51.89 ENCOUNTER FOR OTHER SPECIFIED AFTERCARE: ICD-10-CM

## 2023-01-01 DIAGNOSIS — R79.89 ELEVATED LFTS: ICD-10-CM

## 2023-01-01 DIAGNOSIS — R11.0 CHEMOTHERAPY-INDUCED NAUSEA: ICD-10-CM

## 2023-01-01 DIAGNOSIS — C49.9 SPINDLE CELL SARCOMA (H): Primary | ICD-10-CM

## 2023-01-01 DIAGNOSIS — G89.3 CANCER ASSOCIATED PAIN: ICD-10-CM

## 2023-01-01 DIAGNOSIS — E87.6 HYPOKALEMIA: ICD-10-CM

## 2023-01-01 DIAGNOSIS — Z51.81 ENCOUNTER FOR MONITORING CARDIOTOXIC DRUG THERAPY: ICD-10-CM

## 2023-01-01 DIAGNOSIS — R30.0 DYSURIA: Primary | ICD-10-CM

## 2023-01-01 DIAGNOSIS — C45.9 MESOTHELIOMA, MALIGNANT (H): ICD-10-CM

## 2023-01-01 DIAGNOSIS — C49.9 SPINDLE CELL SARCOMA (H): ICD-10-CM

## 2023-01-01 DIAGNOSIS — R49.0 DYSPHONIA: ICD-10-CM

## 2023-01-01 DIAGNOSIS — R10.84 ABDOMINAL PAIN, GENERALIZED: ICD-10-CM

## 2023-01-01 DIAGNOSIS — E23.0 PANHYPOPITUITARISM (H): Primary | ICD-10-CM

## 2023-01-01 DIAGNOSIS — E23.0 PANHYPOPITUITARISM (H): ICD-10-CM

## 2023-01-01 DIAGNOSIS — E86.0 DEHYDRATION: ICD-10-CM

## 2023-01-01 DIAGNOSIS — R32 URINARY INCONTINENCE, UNSPECIFIED TYPE: ICD-10-CM

## 2023-01-01 DIAGNOSIS — D70.1 CHEMOTHERAPY-INDUCED NEUTROPENIA (H): ICD-10-CM

## 2023-01-01 DIAGNOSIS — R50.9 FEVER, UNSPECIFIED FEVER CAUSE: ICD-10-CM

## 2023-01-01 DIAGNOSIS — G89.18 ACUTE POST-OPERATIVE PAIN: ICD-10-CM

## 2023-01-01 DIAGNOSIS — R06.09 DYSPNEA ON EXERTION: Primary | ICD-10-CM

## 2023-01-01 DIAGNOSIS — R63.0 ANOREXIA SYMPTOM: ICD-10-CM

## 2023-01-01 DIAGNOSIS — I10 HYPERTENSION, UNSPECIFIED TYPE: Primary | ICD-10-CM

## 2023-01-01 DIAGNOSIS — D72.829 LEUKOCYTOSIS, UNSPECIFIED TYPE: ICD-10-CM

## 2023-01-01 DIAGNOSIS — K81.0 ACUTE CHOLECYSTITIS: ICD-10-CM

## 2023-01-01 DIAGNOSIS — T45.1X5A CHEMOTHERAPY-INDUCED NAUSEA: Primary | ICD-10-CM

## 2023-01-01 DIAGNOSIS — R06.09 DYSPNEA ON EXERTION: ICD-10-CM

## 2023-01-01 DIAGNOSIS — R43.2 LOSS OF TASTE: ICD-10-CM

## 2023-01-01 DIAGNOSIS — I10 HYPERTENSION, UNSPECIFIED TYPE: ICD-10-CM

## 2023-01-01 DIAGNOSIS — R21 RASH AND NONSPECIFIC SKIN ERUPTION: ICD-10-CM

## 2023-01-01 DIAGNOSIS — F32.9 REACTIVE DEPRESSION: ICD-10-CM

## 2023-01-01 DIAGNOSIS — R29.898 WEAKNESS OF BOTH LOWER EXTREMITIES: ICD-10-CM

## 2023-01-01 DIAGNOSIS — N18.31 STAGE 3A CHRONIC KIDNEY DISEASE (H): ICD-10-CM

## 2023-01-01 DIAGNOSIS — R53.81 PHYSICAL DECONDITIONING: ICD-10-CM

## 2023-01-01 DIAGNOSIS — B37.0 CANDIDIASIS OF MOUTH: Primary | ICD-10-CM

## 2023-01-01 DIAGNOSIS — Z71.89 ADVANCE CARE PLANNING: ICD-10-CM

## 2023-01-01 DIAGNOSIS — R11.0 CHEMOTHERAPY-INDUCED NAUSEA: Primary | ICD-10-CM

## 2023-01-01 DIAGNOSIS — E23.0 HYPOPITUITARISM (H): ICD-10-CM

## 2023-01-01 DIAGNOSIS — E83.52 HYPERCALCEMIA: Primary | ICD-10-CM

## 2023-01-01 DIAGNOSIS — E83.39 HYPOPHOSPHATEMIA: ICD-10-CM

## 2023-01-01 DIAGNOSIS — E23.0 HYPOPITUITARISM (H): Primary | ICD-10-CM

## 2023-01-01 DIAGNOSIS — D63.0 ANEMIA IN NEOPLASTIC DISEASE: Primary | ICD-10-CM

## 2023-01-01 DIAGNOSIS — B37.0 THRUSH: Primary | ICD-10-CM

## 2023-01-01 DIAGNOSIS — D63.0 ANEMIA IN NEOPLASTIC DISEASE: ICD-10-CM

## 2023-01-01 DIAGNOSIS — E27.40 ADRENAL INSUFFICIENCY (H): ICD-10-CM

## 2023-01-01 DIAGNOSIS — R53.83 OTHER FATIGUE: ICD-10-CM

## 2023-01-01 DIAGNOSIS — E03.8 CENTRAL HYPOTHYROIDISM: ICD-10-CM

## 2023-01-01 DIAGNOSIS — R79.89 ELEVATED PROCALCITONIN: ICD-10-CM

## 2023-01-01 DIAGNOSIS — E27.49 SECONDARY ADRENAL INSUFFICIENCY (H): ICD-10-CM

## 2023-01-01 DIAGNOSIS — Z79.899 ENCOUNTER FOR MONITORING CARDIOTOXIC DRUG THERAPY: ICD-10-CM

## 2023-01-01 DIAGNOSIS — F40.240 CLAUSTROPHOBIA: ICD-10-CM

## 2023-01-01 DIAGNOSIS — R53.1 GENERALIZED WEAKNESS: ICD-10-CM

## 2023-01-01 DIAGNOSIS — R30.0 DYSURIA: ICD-10-CM

## 2023-01-01 DIAGNOSIS — E83.52 HYPERCALCEMIA: ICD-10-CM

## 2023-01-01 DIAGNOSIS — Z98.890 HISTORY OF PITUITARY SURGERY: ICD-10-CM

## 2023-01-01 LAB
ABO/RH(D): NORMAL
ABO/RH(D): NORMAL
ALBUMIN SERPL BCG-MCNC: 3.1 G/DL (ref 3.5–5.2)
ALBUMIN SERPL BCG-MCNC: 3.1 G/DL (ref 3.5–5.2)
ALBUMIN SERPL BCG-MCNC: 3.2 G/DL (ref 3.5–5.2)
ALBUMIN SERPL BCG-MCNC: 3.3 G/DL (ref 3.5–5.2)
ALBUMIN SERPL BCG-MCNC: 3.4 G/DL (ref 3.5–5.2)
ALBUMIN SERPL BCG-MCNC: 3.5 G/DL (ref 3.5–5.2)
ALBUMIN SERPL BCG-MCNC: 3.5 G/DL (ref 3.5–5.2)
ALBUMIN SERPL BCG-MCNC: 3.6 G/DL (ref 3.5–5.2)
ALBUMIN SERPL BCG-MCNC: 3.6 G/DL (ref 3.5–5.2)
ALBUMIN UR-MCNC: 100 MG/DL
ALBUMIN UR-MCNC: 30 MG/DL
ALBUMIN UR-MCNC: 50 MG/DL
ALP SERPL-CCNC: 120 U/L (ref 40–129)
ALP SERPL-CCNC: 151 U/L (ref 40–129)
ALP SERPL-CCNC: 152 U/L (ref 40–129)
ALP SERPL-CCNC: 157 U/L (ref 40–129)
ALP SERPL-CCNC: 167 U/L (ref 40–129)
ALP SERPL-CCNC: 168 U/L (ref 40–129)
ALP SERPL-CCNC: 169 U/L (ref 40–129)
ALP SERPL-CCNC: 169 U/L (ref 40–129)
ALP SERPL-CCNC: 173 U/L (ref 40–129)
ALP SERPL-CCNC: 175 U/L (ref 40–129)
ALP SERPL-CCNC: 177 U/L (ref 40–129)
ALP SERPL-CCNC: 179 U/L (ref 40–129)
ALP SERPL-CCNC: 181 U/L (ref 40–129)
ALP SERPL-CCNC: 197 U/L (ref 40–129)
ALP SERPL-CCNC: 203 U/L (ref 40–129)
ALP SERPL-CCNC: 206 U/L (ref 40–129)
ALP SERPL-CCNC: 242 U/L (ref 40–129)
ALT SERPL W P-5'-P-CCNC: 14 U/L (ref 10–50)
ALT SERPL W P-5'-P-CCNC: 16 U/L (ref 10–50)
ALT SERPL W P-5'-P-CCNC: 18 U/L (ref 10–50)
ALT SERPL W P-5'-P-CCNC: 18 U/L (ref 10–50)
ALT SERPL W P-5'-P-CCNC: 20 U/L (ref 10–50)
ALT SERPL W P-5'-P-CCNC: 21 U/L (ref 10–50)
ALT SERPL W P-5'-P-CCNC: 23 U/L (ref 10–50)
ALT SERPL W P-5'-P-CCNC: 23 U/L (ref 10–50)
ALT SERPL W P-5'-P-CCNC: 25 U/L (ref 10–50)
ALT SERPL W P-5'-P-CCNC: 29 U/L (ref 10–50)
ALT SERPL W P-5'-P-CCNC: 30 U/L (ref 10–50)
ALT SERPL W P-5'-P-CCNC: 31 U/L (ref 10–50)
ALT SERPL W P-5'-P-CCNC: 31 U/L (ref 10–50)
ALT SERPL W P-5'-P-CCNC: 41 U/L (ref 10–50)
ANION GAP SERPL CALCULATED.3IONS-SCNC: 10 MMOL/L (ref 7–15)
ANION GAP SERPL CALCULATED.3IONS-SCNC: 14 MMOL/L (ref 7–15)
ANION GAP SERPL CALCULATED.3IONS-SCNC: 6 MMOL/L (ref 7–15)
ANION GAP SERPL CALCULATED.3IONS-SCNC: 8 MMOL/L (ref 7–15)
ANION GAP SERPL CALCULATED.3IONS-SCNC: 8 MMOL/L (ref 7–15)
ANION GAP SERPL CALCULATED.3IONS-SCNC: 9 MMOL/L (ref 7–15)
ANTIBODY SCREEN: NEGATIVE
ANTIBODY SCREEN: NEGATIVE
APPEARANCE UR: ABNORMAL
APPEARANCE UR: CLEAR
APPEARANCE UR: CLEAR
APTT PPP: 37 SECONDS (ref 22–38)
AST SERPL W P-5'-P-CCNC: 18 U/L (ref 10–50)
AST SERPL W P-5'-P-CCNC: 19 U/L (ref 10–50)
AST SERPL W P-5'-P-CCNC: 20 U/L (ref 10–50)
AST SERPL W P-5'-P-CCNC: 20 U/L (ref 10–50)
AST SERPL W P-5'-P-CCNC: 21 U/L (ref 10–50)
AST SERPL W P-5'-P-CCNC: 22 U/L (ref 10–50)
AST SERPL W P-5'-P-CCNC: 23 U/L (ref 10–50)
AST SERPL W P-5'-P-CCNC: 23 U/L (ref 10–50)
AST SERPL W P-5'-P-CCNC: 24 U/L (ref 10–50)
AST SERPL W P-5'-P-CCNC: 26 U/L (ref 10–50)
AST SERPL W P-5'-P-CCNC: 27 U/L (ref 10–50)
AST SERPL W P-5'-P-CCNC: 28 U/L (ref 10–50)
AST SERPL W P-5'-P-CCNC: 28 U/L (ref 10–50)
AST SERPL W P-5'-P-CCNC: 38 U/L (ref 10–50)
ATRIAL RATE - MUSE: 83 BPM
BACTERIA #/AREA URNS HPF: ABNORMAL /HPF
BACTERIA BLD CULT: NO GROWTH
BACTERIA UR CULT: NO GROWTH
BASOPHILS # BLD AUTO: 0 10E3/UL (ref 0–0.2)
BASOPHILS # BLD AUTO: 0.1 10E3/UL (ref 0–0.2)
BASOPHILS # BLD MANUAL: 0 10E3/UL (ref 0–0.2)
BASOPHILS NFR BLD AUTO: 0 %
BASOPHILS NFR BLD AUTO: 1 %
BASOPHILS NFR BLD MANUAL: 0 %
BILIRUB DIRECT SERPL-MCNC: <0.2 MG/DL (ref 0–0.3)
BILIRUB DIRECT SERPL-MCNC: <0.2 MG/DL (ref 0–0.3)
BILIRUB SERPL-MCNC: 0.2 MG/DL
BILIRUB SERPL-MCNC: 0.3 MG/DL
BILIRUB SERPL-MCNC: 0.4 MG/DL
BILIRUB UR QL STRIP: NEGATIVE
BLD PROD TYP BPU: NORMAL
BLOOD COMPONENT TYPE: NORMAL
BUN SERPL-MCNC: 18.7 MG/DL (ref 8–23)
BUN SERPL-MCNC: 18.7 MG/DL (ref 8–23)
BUN SERPL-MCNC: 19 MG/DL (ref 8–23)
BUN SERPL-MCNC: 20.6 MG/DL (ref 8–23)
BUN SERPL-MCNC: 21 MG/DL (ref 8–23)
BUN SERPL-MCNC: 21.1 MG/DL (ref 8–23)
BUN SERPL-MCNC: 21.5 MG/DL (ref 8–23)
BUN SERPL-MCNC: 21.9 MG/DL (ref 8–23)
BUN SERPL-MCNC: 22.4 MG/DL (ref 8–23)
BUN SERPL-MCNC: 23.1 MG/DL (ref 8–23)
BUN SERPL-MCNC: 23.3 MG/DL (ref 8–23)
BUN SERPL-MCNC: 23.4 MG/DL (ref 8–23)
BUN SERPL-MCNC: 23.7 MG/DL (ref 8–23)
BUN SERPL-MCNC: 25.2 MG/DL (ref 8–23)
BUN SERPL-MCNC: 26.1 MG/DL (ref 8–23)
BUN SERPL-MCNC: 26.5 MG/DL (ref 8–23)
BUN SERPL-MCNC: 28.3 MG/DL (ref 8–23)
BUN SERPL-MCNC: 30.4 MG/DL (ref 8–23)
CALCIUM SERPL-MCNC: 10.7 MG/DL (ref 8.8–10.2)
CALCIUM SERPL-MCNC: 8.8 MG/DL (ref 8.8–10.2)
CALCIUM SERPL-MCNC: 9 MG/DL (ref 8.8–10.2)
CALCIUM SERPL-MCNC: 9.1 MG/DL (ref 8.8–10.2)
CALCIUM SERPL-MCNC: 9.2 MG/DL (ref 8.8–10.2)
CALCIUM SERPL-MCNC: 9.3 MG/DL (ref 8.8–10.2)
CALCIUM SERPL-MCNC: 9.4 MG/DL (ref 8.8–10.2)
CALCIUM SERPL-MCNC: 9.7 MG/DL (ref 8.8–10.2)
CALCIUM SERPL-MCNC: 9.7 MG/DL (ref 8.8–10.2)
CHLORIDE SERPL-SCNC: 100 MMOL/L (ref 98–107)
CHLORIDE SERPL-SCNC: 100 MMOL/L (ref 98–107)
CHLORIDE SERPL-SCNC: 101 MMOL/L (ref 98–107)
CHLORIDE SERPL-SCNC: 102 MMOL/L (ref 98–107)
CHLORIDE SERPL-SCNC: 103 MMOL/L (ref 98–107)
CHLORIDE SERPL-SCNC: 104 MMOL/L (ref 98–107)
CHLORIDE SERPL-SCNC: 104 MMOL/L (ref 98–107)
CHLORIDE SERPL-SCNC: 97 MMOL/L (ref 98–107)
CODING SYSTEM: NORMAL
COLOR UR AUTO: ABNORMAL
CREAT SERPL-MCNC: 0.97 MG/DL (ref 0.67–1.17)
CREAT SERPL-MCNC: 1.09 MG/DL (ref 0.67–1.17)
CREAT SERPL-MCNC: 1.1 MG/DL (ref 0.67–1.17)
CREAT SERPL-MCNC: 1.1 MG/DL (ref 0.67–1.17)
CREAT SERPL-MCNC: 1.11 MG/DL (ref 0.67–1.17)
CREAT SERPL-MCNC: 1.13 MG/DL (ref 0.67–1.17)
CREAT SERPL-MCNC: 1.17 MG/DL (ref 0.67–1.17)
CREAT SERPL-MCNC: 1.19 MG/DL (ref 0.67–1.17)
CREAT SERPL-MCNC: 1.27 MG/DL (ref 0.67–1.17)
CREAT SERPL-MCNC: 1.34 MG/DL (ref 0.67–1.17)
CREAT SERPL-MCNC: 1.41 MG/DL (ref 0.67–1.17)
CREAT SERPL-MCNC: 1.41 MG/DL (ref 0.67–1.17)
CREAT SERPL-MCNC: 1.44 MG/DL (ref 0.67–1.17)
CREAT SERPL-MCNC: 1.49 MG/DL (ref 0.67–1.17)
CREAT SERPL-MCNC: 1.52 MG/DL (ref 0.67–1.17)
CREAT SERPL-MCNC: 1.56 MG/DL (ref 0.67–1.17)
CREAT SERPL-MCNC: 1.7 MG/DL (ref 0.67–1.17)
CREAT SERPL-MCNC: 1.75 MG/DL (ref 0.67–1.17)
CROSSMATCH: NORMAL
DEPRECATED HCO3 PLAS-SCNC: 25 MMOL/L (ref 22–29)
DEPRECATED HCO3 PLAS-SCNC: 26 MMOL/L (ref 22–29)
DEPRECATED HCO3 PLAS-SCNC: 27 MMOL/L (ref 22–29)
DEPRECATED HCO3 PLAS-SCNC: 28 MMOL/L (ref 22–29)
DEPRECATED HCO3 PLAS-SCNC: 29 MMOL/L (ref 22–29)
DEPRECATED HCO3 PLAS-SCNC: 30 MMOL/L (ref 22–29)
DEPRECATED HCO3 PLAS-SCNC: 31 MMOL/L (ref 22–29)
DIASTOLIC BLOOD PRESSURE - MUSE: NORMAL MMHG
EOSINOPHIL # BLD AUTO: 0 10E3/UL (ref 0–0.7)
EOSINOPHIL # BLD AUTO: 0.1 10E3/UL (ref 0–0.7)
EOSINOPHIL # BLD MANUAL: 0 10E3/UL (ref 0–0.7)
EOSINOPHIL NFR BLD AUTO: 0 %
EOSINOPHIL NFR BLD AUTO: 1 %
EOSINOPHIL NFR BLD MANUAL: 0 %
ERYTHROCYTE [DISTWIDTH] IN BLOOD BY AUTOMATED COUNT: 14.6 % (ref 10–15)
ERYTHROCYTE [DISTWIDTH] IN BLOOD BY AUTOMATED COUNT: 14.9 % (ref 10–15)
ERYTHROCYTE [DISTWIDTH] IN BLOOD BY AUTOMATED COUNT: 15.1 % (ref 10–15)
ERYTHROCYTE [DISTWIDTH] IN BLOOD BY AUTOMATED COUNT: 15.3 % (ref 10–15)
ERYTHROCYTE [DISTWIDTH] IN BLOOD BY AUTOMATED COUNT: 15.4 % (ref 10–15)
ERYTHROCYTE [DISTWIDTH] IN BLOOD BY AUTOMATED COUNT: 15.6 % (ref 10–15)
ERYTHROCYTE [DISTWIDTH] IN BLOOD BY AUTOMATED COUNT: 15.6 % (ref 10–15)
ERYTHROCYTE [DISTWIDTH] IN BLOOD BY AUTOMATED COUNT: 15.7 % (ref 10–15)
ERYTHROCYTE [DISTWIDTH] IN BLOOD BY AUTOMATED COUNT: 15.9 % (ref 10–15)
ERYTHROCYTE [DISTWIDTH] IN BLOOD BY AUTOMATED COUNT: 16.2 % (ref 10–15)
ERYTHROCYTE [DISTWIDTH] IN BLOOD BY AUTOMATED COUNT: 16.5 % (ref 10–15)
ERYTHROCYTE [DISTWIDTH] IN BLOOD BY AUTOMATED COUNT: 16.6 % (ref 10–15)
ERYTHROCYTE [DISTWIDTH] IN BLOOD BY AUTOMATED COUNT: 16.8 % (ref 10–15)
ERYTHROCYTE [DISTWIDTH] IN BLOOD BY AUTOMATED COUNT: 17.2 % (ref 10–15)
ERYTHROCYTE [DISTWIDTH] IN BLOOD BY AUTOMATED COUNT: 17.3 % (ref 10–15)
ERYTHROCYTE [DISTWIDTH] IN BLOOD BY AUTOMATED COUNT: 17.5 % (ref 10–15)
ERYTHROCYTE [DISTWIDTH] IN BLOOD BY AUTOMATED COUNT: 17.5 % (ref 10–15)
FLUAV RNA SPEC QL NAA+PROBE: NEGATIVE
FLUAV RNA SPEC QL NAA+PROBE: NEGATIVE
FLUBV RNA RESP QL NAA+PROBE: NEGATIVE
FLUBV RNA RESP QL NAA+PROBE: NEGATIVE
GFR SERPL CREATININE-BSD FRML MDRD: 40 ML/MIN/1.73M2
GFR SERPL CREATININE-BSD FRML MDRD: 42 ML/MIN/1.73M2
GFR SERPL CREATININE-BSD FRML MDRD: 46 ML/MIN/1.73M2
GFR SERPL CREATININE-BSD FRML MDRD: 48 ML/MIN/1.73M2
GFR SERPL CREATININE-BSD FRML MDRD: 49 ML/MIN/1.73M2
GFR SERPL CREATININE-BSD FRML MDRD: 51 ML/MIN/1.73M2
GFR SERPL CREATININE-BSD FRML MDRD: 52 ML/MIN/1.73M2
GFR SERPL CREATININE-BSD FRML MDRD: 52 ML/MIN/1.73M2
GFR SERPL CREATININE-BSD FRML MDRD: 56 ML/MIN/1.73M2
GFR SERPL CREATININE-BSD FRML MDRD: 59 ML/MIN/1.73M2
GFR SERPL CREATININE-BSD FRML MDRD: 64 ML/MIN/1.73M2
GFR SERPL CREATININE-BSD FRML MDRD: 65 ML/MIN/1.73M2
GFR SERPL CREATININE-BSD FRML MDRD: 68 ML/MIN/1.73M2
GFR SERPL CREATININE-BSD FRML MDRD: 70 ML/MIN/1.73M2
GFR SERPL CREATININE-BSD FRML MDRD: 71 ML/MIN/1.73M2
GFR SERPL CREATININE-BSD FRML MDRD: 82 ML/MIN/1.73M2
GLUCOSE SERPL-MCNC: 105 MG/DL (ref 70–99)
GLUCOSE SERPL-MCNC: 106 MG/DL (ref 70–99)
GLUCOSE SERPL-MCNC: 107 MG/DL (ref 70–99)
GLUCOSE SERPL-MCNC: 117 MG/DL (ref 70–99)
GLUCOSE SERPL-MCNC: 117 MG/DL (ref 70–99)
GLUCOSE SERPL-MCNC: 122 MG/DL (ref 70–99)
GLUCOSE SERPL-MCNC: 125 MG/DL (ref 70–99)
GLUCOSE SERPL-MCNC: 131 MG/DL (ref 70–99)
GLUCOSE SERPL-MCNC: 134 MG/DL (ref 70–99)
GLUCOSE SERPL-MCNC: 139 MG/DL (ref 70–99)
GLUCOSE SERPL-MCNC: 145 MG/DL (ref 70–99)
GLUCOSE SERPL-MCNC: 149 MG/DL (ref 70–99)
GLUCOSE SERPL-MCNC: 149 MG/DL (ref 70–99)
GLUCOSE SERPL-MCNC: 162 MG/DL (ref 70–99)
GLUCOSE SERPL-MCNC: 182 MG/DL (ref 70–99)
GLUCOSE SERPL-MCNC: 185 MG/DL (ref 70–99)
GLUCOSE SERPL-MCNC: 193 MG/DL (ref 70–99)
GLUCOSE SERPL-MCNC: 214 MG/DL (ref 70–99)
GLUCOSE UR STRIP-MCNC: 50 MG/DL
GLUCOSE UR STRIP-MCNC: 50 MG/DL
GLUCOSE UR STRIP-MCNC: NEGATIVE MG/DL
GRANULAR CAST: 1 /LPF
GRANULAR CAST: 10 /LPF
HCT VFR BLD AUTO: 24.4 % (ref 40–53)
HCT VFR BLD AUTO: 26.5 % (ref 40–53)
HCT VFR BLD AUTO: 27.9 % (ref 40–53)
HCT VFR BLD AUTO: 29.8 % (ref 40–53)
HCT VFR BLD AUTO: 30.4 % (ref 40–53)
HCT VFR BLD AUTO: 30.5 % (ref 40–53)
HCT VFR BLD AUTO: 30.7 % (ref 40–53)
HCT VFR BLD AUTO: 31 % (ref 40–53)
HCT VFR BLD AUTO: 31.2 % (ref 40–53)
HCT VFR BLD AUTO: 31.7 % (ref 40–53)
HCT VFR BLD AUTO: 31.7 % (ref 40–53)
HCT VFR BLD AUTO: 31.8 % (ref 40–53)
HCT VFR BLD AUTO: 31.9 % (ref 40–53)
HCT VFR BLD AUTO: 32 % (ref 40–53)
HCT VFR BLD AUTO: 32.2 % (ref 40–53)
HCT VFR BLD AUTO: 35.2 % (ref 40–53)
HCT VFR BLD AUTO: 35.2 % (ref 40–53)
HCT VFR BLD AUTO: 35.4 % (ref 40–53)
HCT VFR BLD AUTO: 36.1 % (ref 40–53)
HGB BLD-MCNC: 10.2 G/DL (ref 13.3–17.7)
HGB BLD-MCNC: 10.8 G/DL (ref 13.3–17.7)
HGB BLD-MCNC: 11.3 G/DL (ref 13.3–17.7)
HGB BLD-MCNC: 11.3 G/DL (ref 13.3–17.7)
HGB BLD-MCNC: 11.6 G/DL (ref 13.3–17.7)
HGB BLD-MCNC: 7.7 G/DL (ref 13.3–17.7)
HGB BLD-MCNC: 8.5 G/DL (ref 13.3–17.7)
HGB BLD-MCNC: 8.8 G/DL (ref 13.3–17.7)
HGB BLD-MCNC: 9.2 G/DL (ref 13.3–17.7)
HGB BLD-MCNC: 9.3 G/DL (ref 13.3–17.7)
HGB BLD-MCNC: 9.4 G/DL (ref 13.3–17.7)
HGB BLD-MCNC: 9.5 G/DL (ref 13.3–17.7)
HGB BLD-MCNC: 9.5 G/DL (ref 13.3–17.7)
HGB BLD-MCNC: 9.6 G/DL (ref 13.3–17.7)
HGB BLD-MCNC: 9.7 G/DL (ref 13.3–17.7)
HGB BLD-MCNC: 9.8 G/DL (ref 13.3–17.7)
HGB BLD-MCNC: 9.8 G/DL (ref 13.3–17.7)
HGB BLD-MCNC: 9.9 G/DL (ref 13.3–17.7)
HGB BLD-MCNC: 9.9 G/DL (ref 13.3–17.7)
HGB UR QL STRIP: ABNORMAL
HOLD SPECIMEN: NORMAL
HYALINE CASTS: 4 /LPF
IMM GRANULOCYTES # BLD: 0.1 10E3/UL
IMM GRANULOCYTES # BLD: 0.2 10E3/UL
IMM GRANULOCYTES # BLD: 0.3 10E3/UL
IMM GRANULOCYTES # BLD: 0.4 10E3/UL
IMM GRANULOCYTES # BLD: 0.5 10E3/UL
IMM GRANULOCYTES # BLD: 0.6 10E3/UL
IMM GRANULOCYTES NFR BLD: 1 %
IMM GRANULOCYTES NFR BLD: 2 %
IMM GRANULOCYTES NFR BLD: 3 %
IMM GRANULOCYTES NFR BLD: 3 %
IMM GRANULOCYTES NFR BLD: 4 %
IMM GRANULOCYTES NFR BLD: 5 %
INR PPP: 1.21 (ref 0.85–1.15)
INTERPRETATION ECG - MUSE: NORMAL
ISSUE DATE AND TIME: NORMAL
KETONES UR STRIP-MCNC: NEGATIVE MG/DL
LACTATE SERPL-SCNC: 1 MMOL/L (ref 0.7–2)
LACTATE SERPL-SCNC: 1 MMOL/L (ref 0.7–2)
LEUKOCYTE ESTERASE UR QL STRIP: NEGATIVE
LYMPHOCYTES # BLD AUTO: 0.6 10E3/UL (ref 0.8–5.3)
LYMPHOCYTES # BLD AUTO: 0.7 10E3/UL (ref 0.8–5.3)
LYMPHOCYTES # BLD AUTO: 0.8 10E3/UL (ref 0.8–5.3)
LYMPHOCYTES # BLD AUTO: 0.9 10E3/UL (ref 0.8–5.3)
LYMPHOCYTES # BLD AUTO: 1 10E3/UL (ref 0.8–5.3)
LYMPHOCYTES # BLD AUTO: 1 10E3/UL (ref 0.8–5.3)
LYMPHOCYTES # BLD AUTO: 1.1 10E3/UL (ref 0.8–5.3)
LYMPHOCYTES # BLD AUTO: 1.2 10E3/UL (ref 0.8–5.3)
LYMPHOCYTES # BLD AUTO: 1.3 10E3/UL (ref 0.8–5.3)
LYMPHOCYTES # BLD AUTO: 1.4 10E3/UL (ref 0.8–5.3)
LYMPHOCYTES # BLD AUTO: 1.5 10E3/UL (ref 0.8–5.3)
LYMPHOCYTES # BLD AUTO: 1.8 10E3/UL (ref 0.8–5.3)
LYMPHOCYTES # BLD AUTO: 2.1 10E3/UL (ref 0.8–5.3)
LYMPHOCYTES # BLD MANUAL: 1 10E3/UL (ref 0.8–5.3)
LYMPHOCYTES NFR BLD AUTO: 10 %
LYMPHOCYTES NFR BLD AUTO: 11 %
LYMPHOCYTES NFR BLD AUTO: 11 %
LYMPHOCYTES NFR BLD AUTO: 12 %
LYMPHOCYTES NFR BLD AUTO: 13 %
LYMPHOCYTES NFR BLD AUTO: 14 %
LYMPHOCYTES NFR BLD AUTO: 18 %
LYMPHOCYTES NFR BLD AUTO: 5 %
LYMPHOCYTES NFR BLD AUTO: 5 %
LYMPHOCYTES NFR BLD AUTO: 6 %
LYMPHOCYTES NFR BLD AUTO: 7 %
LYMPHOCYTES NFR BLD AUTO: 7 %
LYMPHOCYTES NFR BLD AUTO: 8 %
LYMPHOCYTES NFR BLD MANUAL: 7 %
MAGNESIUM SERPL-MCNC: 1.7 MG/DL (ref 1.7–2.3)
MAGNESIUM SERPL-MCNC: 1.8 MG/DL (ref 1.7–2.3)
MAGNESIUM SERPL-MCNC: 1.8 MG/DL (ref 1.7–2.3)
MAGNESIUM SERPL-MCNC: 1.9 MG/DL (ref 1.7–2.3)
MAGNESIUM SERPL-MCNC: 2.1 MG/DL (ref 1.7–2.3)
MAGNESIUM SERPL-MCNC: 2.2 MG/DL (ref 1.7–2.3)
MAGNESIUM SERPL-MCNC: 2.3 MG/DL (ref 1.7–2.3)
MCH RBC QN AUTO: 30.3 PG (ref 26.5–33)
MCH RBC QN AUTO: 30.5 PG (ref 26.5–33)
MCH RBC QN AUTO: 30.6 PG (ref 26.5–33)
MCH RBC QN AUTO: 30.8 PG (ref 26.5–33)
MCH RBC QN AUTO: 30.9 PG (ref 26.5–33)
MCH RBC QN AUTO: 31 PG (ref 26.5–33)
MCH RBC QN AUTO: 31 PG (ref 26.5–33)
MCH RBC QN AUTO: 31.1 PG (ref 26.5–33)
MCH RBC QN AUTO: 31.1 PG (ref 26.5–33)
MCH RBC QN AUTO: 31.3 PG (ref 26.5–33)
MCH RBC QN AUTO: 31.3 PG (ref 26.5–33)
MCH RBC QN AUTO: 31.4 PG (ref 26.5–33)
MCH RBC QN AUTO: 31.4 PG (ref 26.5–33)
MCH RBC QN AUTO: 31.5 PG (ref 26.5–33)
MCH RBC QN AUTO: 31.5 PG (ref 26.5–33)
MCH RBC QN AUTO: 31.7 PG (ref 26.5–33)
MCH RBC QN AUTO: 31.7 PG (ref 26.5–33)
MCH RBC QN AUTO: 31.8 PG (ref 26.5–33)
MCH RBC QN AUTO: 31.8 PG (ref 26.5–33)
MCHC RBC AUTO-ENTMCNC: 30 G/DL (ref 31.5–36.5)
MCHC RBC AUTO-ENTMCNC: 30.4 G/DL (ref 31.5–36.5)
MCHC RBC AUTO-ENTMCNC: 30.4 G/DL (ref 31.5–36.5)
MCHC RBC AUTO-ENTMCNC: 30.5 G/DL (ref 31.5–36.5)
MCHC RBC AUTO-ENTMCNC: 30.6 G/DL (ref 31.5–36.5)
MCHC RBC AUTO-ENTMCNC: 30.6 G/DL (ref 31.5–36.5)
MCHC RBC AUTO-ENTMCNC: 30.9 G/DL (ref 31.5–36.5)
MCHC RBC AUTO-ENTMCNC: 30.9 G/DL (ref 31.5–36.5)
MCHC RBC AUTO-ENTMCNC: 31 G/DL (ref 31.5–36.5)
MCHC RBC AUTO-ENTMCNC: 31.1 G/DL (ref 31.5–36.5)
MCHC RBC AUTO-ENTMCNC: 31.1 G/DL (ref 31.5–36.5)
MCHC RBC AUTO-ENTMCNC: 31.5 G/DL (ref 31.5–36.5)
MCHC RBC AUTO-ENTMCNC: 31.6 G/DL (ref 31.5–36.5)
MCHC RBC AUTO-ENTMCNC: 31.6 G/DL (ref 31.5–36.5)
MCHC RBC AUTO-ENTMCNC: 31.9 G/DL (ref 31.5–36.5)
MCHC RBC AUTO-ENTMCNC: 32.1 G/DL (ref 31.5–36.5)
MCV RBC AUTO: 100 FL (ref 78–100)
MCV RBC AUTO: 101 FL (ref 78–100)
MCV RBC AUTO: 102 FL (ref 78–100)
MCV RBC AUTO: 103 FL (ref 78–100)
MCV RBC AUTO: 104 FL (ref 78–100)
MCV RBC AUTO: 96 FL (ref 78–100)
MCV RBC AUTO: 96 FL (ref 78–100)
MCV RBC AUTO: 97 FL (ref 78–100)
MCV RBC AUTO: 99 FL (ref 78–100)
METAMYELOCYTES # BLD MANUAL: 0.3 10E3/UL
METAMYELOCYTES NFR BLD MANUAL: 2 %
MONOCYTES # BLD AUTO: 0.3 10E3/UL (ref 0–1.3)
MONOCYTES # BLD AUTO: 0.5 10E3/UL (ref 0–1.3)
MONOCYTES # BLD AUTO: 0.5 10E3/UL (ref 0–1.3)
MONOCYTES # BLD AUTO: 0.6 10E3/UL (ref 0–1.3)
MONOCYTES # BLD AUTO: 0.6 10E3/UL (ref 0–1.3)
MONOCYTES # BLD AUTO: 0.8 10E3/UL (ref 0–1.3)
MONOCYTES # BLD AUTO: 0.9 10E3/UL (ref 0–1.3)
MONOCYTES # BLD AUTO: 0.9 10E3/UL (ref 0–1.3)
MONOCYTES # BLD AUTO: 1 10E3/UL (ref 0–1.3)
MONOCYTES # BLD AUTO: 1.1 10E3/UL (ref 0–1.3)
MONOCYTES # BLD AUTO: 1.2 10E3/UL (ref 0–1.3)
MONOCYTES # BLD AUTO: 1.4 10E3/UL (ref 0–1.3)
MONOCYTES # BLD MANUAL: 0.1 10E3/UL (ref 0–1.3)
MONOCYTES NFR BLD AUTO: 10 %
MONOCYTES NFR BLD AUTO: 10 %
MONOCYTES NFR BLD AUTO: 3 %
MONOCYTES NFR BLD AUTO: 4 %
MONOCYTES NFR BLD AUTO: 5 %
MONOCYTES NFR BLD AUTO: 6 %
MONOCYTES NFR BLD AUTO: 7 %
MONOCYTES NFR BLD AUTO: 8 %
MONOCYTES NFR BLD AUTO: 9 %
MONOCYTES NFR BLD MANUAL: 1 %
MUCOUS THREADS #/AREA URNS LPF: PRESENT /LPF
NEUTROPHILS # BLD AUTO: 10.9 10E3/UL (ref 1.6–8.3)
NEUTROPHILS # BLD AUTO: 11 10E3/UL (ref 1.6–8.3)
NEUTROPHILS # BLD AUTO: 11 10E3/UL (ref 1.6–8.3)
NEUTROPHILS # BLD AUTO: 12.1 10E3/UL (ref 1.6–8.3)
NEUTROPHILS # BLD AUTO: 12.5 10E3/UL (ref 1.6–8.3)
NEUTROPHILS # BLD AUTO: 12.8 10E3/UL (ref 1.6–8.3)
NEUTROPHILS # BLD AUTO: 14.6 10E3/UL (ref 1.6–8.3)
NEUTROPHILS # BLD AUTO: 19.5 10E3/UL (ref 1.6–8.3)
NEUTROPHILS # BLD AUTO: 6.5 10E3/UL (ref 1.6–8.3)
NEUTROPHILS # BLD AUTO: 6.6 10E3/UL (ref 1.6–8.3)
NEUTROPHILS # BLD AUTO: 6.9 10E3/UL (ref 1.6–8.3)
NEUTROPHILS # BLD AUTO: 8.1 10E3/UL (ref 1.6–8.3)
NEUTROPHILS # BLD AUTO: 8.1 10E3/UL (ref 1.6–8.3)
NEUTROPHILS # BLD AUTO: 8.4 10E3/UL (ref 1.6–8.3)
NEUTROPHILS # BLD AUTO: 8.6 10E3/UL (ref 1.6–8.3)
NEUTROPHILS # BLD AUTO: 9.3 10E3/UL (ref 1.6–8.3)
NEUTROPHILS # BLD AUTO: 9.6 10E3/UL (ref 1.6–8.3)
NEUTROPHILS # BLD MANUAL: 12.6 10E3/UL (ref 1.6–8.3)
NEUTROPHILS NFR BLD AUTO: 66 %
NEUTROPHILS NFR BLD AUTO: 73 %
NEUTROPHILS NFR BLD AUTO: 75 %
NEUTROPHILS NFR BLD AUTO: 76 %
NEUTROPHILS NFR BLD AUTO: 76 %
NEUTROPHILS NFR BLD AUTO: 80 %
NEUTROPHILS NFR BLD AUTO: 80 %
NEUTROPHILS NFR BLD AUTO: 83 %
NEUTROPHILS NFR BLD AUTO: 83 %
NEUTROPHILS NFR BLD AUTO: 84 %
NEUTROPHILS NFR BLD AUTO: 87 %
NEUTROPHILS NFR BLD AUTO: 88 %
NEUTROPHILS NFR BLD AUTO: 89 %
NEUTROPHILS NFR BLD MANUAL: 90 %
NITRATE UR QL: NEGATIVE
NRBC # BLD AUTO: 0 10E3/UL
NRBC # BLD AUTO: 0.1 10E3/UL
NRBC BLD AUTO-RTO: 0 /100
NT-PROBNP SERPL-MCNC: 1378 PG/ML (ref 0–900)
P AXIS - MUSE: 61 DEGREES
PH UR STRIP: 5.5 [PH] (ref 5–7)
PH UR STRIP: 5.5 [PH] (ref 5–7)
PH UR STRIP: 6 [PH] (ref 5–7)
PHOSPHATE SERPL-MCNC: 2.2 MG/DL (ref 2.5–4.5)
PHOSPHATE SERPL-MCNC: 2.3 MG/DL (ref 2.5–4.5)
PHOSPHATE SERPL-MCNC: 2.4 MG/DL (ref 2.5–4.5)
PHOSPHATE SERPL-MCNC: 2.6 MG/DL (ref 2.5–4.5)
PHOSPHATE SERPL-MCNC: 2.7 MG/DL (ref 2.5–4.5)
PHOSPHATE SERPL-MCNC: 2.7 MG/DL (ref 2.5–4.5)
PHOSPHATE SERPL-MCNC: 2.9 MG/DL (ref 2.5–4.5)
PHOSPHATE SERPL-MCNC: 3 MG/DL (ref 2.5–4.5)
PHOSPHATE SERPL-MCNC: 3.2 MG/DL (ref 2.5–4.5)
PHOSPHATE SERPL-MCNC: 3.3 MG/DL (ref 2.5–4.5)
PHOSPHATE SERPL-MCNC: 3.3 MG/DL (ref 2.5–4.5)
PHOSPHATE SERPL-MCNC: 3.7 MG/DL (ref 2.5–4.5)
PHOSPHATE SERPL-MCNC: 4 MG/DL (ref 2.5–4.5)
PLAT MORPH BLD: ABNORMAL
PLATELET # BLD AUTO: 110 10E3/UL (ref 150–450)
PLATELET # BLD AUTO: 111 10E3/UL (ref 150–450)
PLATELET # BLD AUTO: 123 10E3/UL (ref 150–450)
PLATELET # BLD AUTO: 127 10E3/UL (ref 150–450)
PLATELET # BLD AUTO: 130 10E3/UL (ref 150–450)
PLATELET # BLD AUTO: 138 10E3/UL (ref 150–450)
PLATELET # BLD AUTO: 144 10E3/UL (ref 150–450)
PLATELET # BLD AUTO: 148 10E3/UL (ref 150–450)
PLATELET # BLD AUTO: 149 10E3/UL (ref 150–450)
PLATELET # BLD AUTO: 150 10E3/UL (ref 150–450)
PLATELET # BLD AUTO: 157 10E3/UL (ref 150–450)
PLATELET # BLD AUTO: 167 10E3/UL (ref 150–450)
PLATELET # BLD AUTO: 167 10E3/UL (ref 150–450)
PLATELET # BLD AUTO: 168 10E3/UL (ref 150–450)
PLATELET # BLD AUTO: 188 10E3/UL (ref 150–450)
PLATELET # BLD AUTO: 193 10E3/UL (ref 150–450)
PLATELET # BLD AUTO: 218 10E3/UL (ref 150–450)
PLATELET # BLD AUTO: 232 10E3/UL (ref 150–450)
PLATELET # BLD AUTO: 242 10E3/UL (ref 150–450)
POTASSIUM SERPL-SCNC: 3.4 MMOL/L (ref 3.4–5.3)
POTASSIUM SERPL-SCNC: 3.4 MMOL/L (ref 3.4–5.3)
POTASSIUM SERPL-SCNC: 3.6 MMOL/L (ref 3.4–5.3)
POTASSIUM SERPL-SCNC: 3.7 MMOL/L (ref 3.4–5.3)
POTASSIUM SERPL-SCNC: 3.8 MMOL/L (ref 3.4–5.3)
POTASSIUM SERPL-SCNC: 3.8 MMOL/L (ref 3.4–5.3)
POTASSIUM SERPL-SCNC: 3.9 MMOL/L (ref 3.4–5.3)
POTASSIUM SERPL-SCNC: 4 MMOL/L (ref 3.4–5.3)
POTASSIUM SERPL-SCNC: 4.1 MMOL/L (ref 3.4–5.3)
POTASSIUM SERPL-SCNC: 4.2 MMOL/L (ref 3.4–5.3)
POTASSIUM SERPL-SCNC: 4.2 MMOL/L (ref 3.4–5.3)
POTASSIUM SERPL-SCNC: 4.3 MMOL/L (ref 3.4–5.3)
POTASSIUM SERPL-SCNC: 4.4 MMOL/L (ref 3.4–5.3)
PR INTERVAL - MUSE: 118 MS
PROCALCITONIN SERPL IA-MCNC: 0.19 NG/ML
PROCALCITONIN SERPL IA-MCNC: 0.89 NG/ML
PROT SERPL-MCNC: 5.6 G/DL (ref 6.4–8.3)
PROT SERPL-MCNC: 5.7 G/DL (ref 6.4–8.3)
PROT SERPL-MCNC: 5.7 G/DL (ref 6.4–8.3)
PROT SERPL-MCNC: 5.9 G/DL (ref 6.4–8.3)
PROT SERPL-MCNC: 5.9 G/DL (ref 6.4–8.3)
PROT SERPL-MCNC: 6 G/DL (ref 6.4–8.3)
PROT SERPL-MCNC: 6.1 G/DL (ref 6.4–8.3)
PROT SERPL-MCNC: 6.3 G/DL (ref 6.4–8.3)
PROT SERPL-MCNC: 6.3 G/DL (ref 6.4–8.3)
PROT SERPL-MCNC: 6.4 G/DL (ref 6.4–8.3)
PROT SERPL-MCNC: 6.5 G/DL (ref 6.4–8.3)
PROT SERPL-MCNC: 6.5 G/DL (ref 6.4–8.3)
PROT SERPL-MCNC: 6.6 G/DL (ref 6.4–8.3)
QRS DURATION - MUSE: 72 MS
QT - MUSE: 354 MS
QTC - MUSE: 415 MS
R AXIS - MUSE: -25 DEGREES
RBC # BLD AUTO: 2.54 10E6/UL (ref 4.4–5.9)
RBC # BLD AUTO: 2.75 10E6/UL (ref 4.4–5.9)
RBC # BLD AUTO: 2.88 10E6/UL (ref 4.4–5.9)
RBC # BLD AUTO: 2.94 10E6/UL (ref 4.4–5.9)
RBC # BLD AUTO: 2.96 10E6/UL (ref 4.4–5.9)
RBC # BLD AUTO: 2.99 10E6/UL (ref 4.4–5.9)
RBC # BLD AUTO: 2.99 10E6/UL (ref 4.4–5.9)
RBC # BLD AUTO: 3.03 10E6/UL (ref 4.4–5.9)
RBC # BLD AUTO: 3.04 10E6/UL (ref 4.4–5.9)
RBC # BLD AUTO: 3.12 10E6/UL (ref 4.4–5.9)
RBC # BLD AUTO: 3.13 10E6/UL (ref 4.4–5.9)
RBC # BLD AUTO: 3.15 10E6/UL (ref 4.4–5.9)
RBC # BLD AUTO: 3.21 10E6/UL (ref 4.4–5.9)
RBC # BLD AUTO: 3.21 10E6/UL (ref 4.4–5.9)
RBC # BLD AUTO: 3.24 10E6/UL (ref 4.4–5.9)
RBC # BLD AUTO: 3.43 10E6/UL (ref 4.4–5.9)
RBC # BLD AUTO: 3.63 10E6/UL (ref 4.4–5.9)
RBC # BLD AUTO: 3.64 10E6/UL (ref 4.4–5.9)
RBC # BLD AUTO: 3.66 10E6/UL (ref 4.4–5.9)
RBC MORPH BLD: ABNORMAL
RBC URINE: 1 /HPF
RBC URINE: 2 /HPF
RBC URINE: <1 /HPF
RSV RNA SPEC NAA+PROBE: NEGATIVE
RSV RNA SPEC NAA+PROBE: NEGATIVE
SARS-COV-2 RNA RESP QL NAA+PROBE: NEGATIVE
SARS-COV-2 RNA RESP QL NAA+PROBE: NEGATIVE
SODIUM SERPL-SCNC: 137 MMOL/L (ref 136–145)
SODIUM SERPL-SCNC: 138 MMOL/L (ref 136–145)
SODIUM SERPL-SCNC: 138 MMOL/L (ref 136–145)
SODIUM SERPL-SCNC: 139 MMOL/L (ref 136–145)
SODIUM SERPL-SCNC: 140 MMOL/L (ref 136–145)
SODIUM SERPL-SCNC: 141 MMOL/L (ref 136–145)
SODIUM SERPL-SCNC: 141 MMOL/L (ref 136–145)
SODIUM SERPL-SCNC: 143 MMOL/L (ref 136–145)
SP GR UR STRIP: 1.01 (ref 1–1.03)
SP GR UR STRIP: 1.02 (ref 1–1.03)
SP GR UR STRIP: 1.02 (ref 1–1.03)
SPECIMEN EXPIRATION DATE: NORMAL
SPECIMEN EXPIRATION DATE: NORMAL
SQUAMOUS EPITHELIAL: 1 /HPF
SQUAMOUS EPITHELIAL: 2 /HPF
SYSTOLIC BLOOD PRESSURE - MUSE: NORMAL MMHG
T AXIS - MUSE: 20 DEGREES
T4 FREE SERPL-MCNC: 1.3 NG/DL (ref 0.9–1.7)
T4 FREE SERPL-MCNC: 1.63 NG/DL (ref 0.9–1.7)
TRANSITIONAL EPI: <1 /HPF
TROPONIN T SERPL HS-MCNC: 32 NG/L
TSH SERPL DL<=0.005 MIU/L-ACNC: 0.21 UIU/ML (ref 0.3–4.2)
TSH SERPL DL<=0.005 MIU/L-ACNC: 0.51 UIU/ML (ref 0.3–4.2)
TSH SERPL DL<=0.005 MIU/L-ACNC: 0.56 UIU/ML (ref 0.3–4.2)
UNIT ABO/RH: NORMAL
UNIT NUMBER: NORMAL
UNIT STATUS: NORMAL
UNIT TYPE ISBT: 5100
UROBILINOGEN UR STRIP-MCNC: <2 MG/DL
UROBILINOGEN UR STRIP-MCNC: <2 MG/DL
UROBILINOGEN UR STRIP-MCNC: NORMAL MG/DL
VENTRICULAR RATE- MUSE: 83 BPM
WBC # BLD AUTO: 10.5 10E3/UL (ref 4–11)
WBC # BLD AUTO: 10.7 10E3/UL (ref 4–11)
WBC # BLD AUTO: 10.7 10E3/UL (ref 4–11)
WBC # BLD AUTO: 10.9 10E3/UL (ref 4–11)
WBC # BLD AUTO: 11.1 10E3/UL (ref 4–11)
WBC # BLD AUTO: 12.4 10E3/UL (ref 4–11)
WBC # BLD AUTO: 13.2 10E3/UL (ref 4–11)
WBC # BLD AUTO: 13.5 10E3/UL (ref 4–11)
WBC # BLD AUTO: 13.7 10E3/UL (ref 4–11)
WBC # BLD AUTO: 14 10E3/UL (ref 4–11)
WBC # BLD AUTO: 14.8 10E3/UL (ref 4–11)
WBC # BLD AUTO: 15 10E3/UL (ref 4–11)
WBC # BLD AUTO: 15.1 10E3/UL (ref 4–11)
WBC # BLD AUTO: 16.4 10E3/UL (ref 4–11)
WBC # BLD AUTO: 22.2 10E3/UL (ref 4–11)
WBC # BLD AUTO: 8 10E3/UL (ref 4–11)
WBC # BLD AUTO: 8.2 10E3/UL (ref 4–11)
WBC # BLD AUTO: 9.6 10E3/UL (ref 4–11)
WBC # BLD AUTO: 9.8 10E3/UL (ref 4–11)
WBC URINE: 1 /HPF
WBC URINE: 1 /HPF
WBC URINE: <1 /HPF

## 2023-01-01 PROCEDURE — 83735 ASSAY OF MAGNESIUM: CPT | Performed by: INTERNAL MEDICINE

## 2023-01-01 PROCEDURE — G1010 CDSM STANSON: HCPCS

## 2023-01-01 PROCEDURE — 99214 OFFICE O/P EST MOD 30 MIN: CPT | Mod: VID | Performed by: INTERNAL MEDICINE

## 2023-01-01 PROCEDURE — 83735 ASSAY OF MAGNESIUM: CPT | Performed by: PHYSICIAN ASSISTANT

## 2023-01-01 PROCEDURE — 70496 CT ANGIOGRAPHY HEAD: CPT | Mod: MG

## 2023-01-01 PROCEDURE — G0463 HOSPITAL OUTPT CLINIC VISIT: HCPCS

## 2023-01-01 PROCEDURE — 250N000011 HC RX IP 250 OP 636: Performed by: PHYSICIAN ASSISTANT

## 2023-01-01 PROCEDURE — 258N000003 HC RX IP 258 OP 636: Performed by: PHYSICIAN ASSISTANT

## 2023-01-01 PROCEDURE — G0463 HOSPITAL OUTPT CLINIC VISIT: HCPCS | Mod: PN,GT | Performed by: PHYSICIAN ASSISTANT

## 2023-01-01 PROCEDURE — 99214 OFFICE O/P EST MOD 30 MIN: CPT | Mod: VID | Performed by: PHYSICIAN ASSISTANT

## 2023-01-01 PROCEDURE — 84439 ASSAY OF FREE THYROXINE: CPT | Performed by: INTERNAL MEDICINE

## 2023-01-01 PROCEDURE — 36591 DRAW BLOOD OFF VENOUS DEVICE: CPT

## 2023-01-01 PROCEDURE — 85014 HEMATOCRIT: CPT | Performed by: PHYSICIAN ASSISTANT

## 2023-01-01 PROCEDURE — 85025 COMPLETE CBC W/AUTO DIFF WBC: CPT

## 2023-01-01 PROCEDURE — 85025 COMPLETE CBC W/AUTO DIFF WBC: CPT | Performed by: PHYSICIAN ASSISTANT

## 2023-01-01 PROCEDURE — 96360 HYDRATION IV INFUSION INIT: CPT

## 2023-01-01 PROCEDURE — 84100 ASSAY OF PHOSPHORUS: CPT | Performed by: PHYSICIAN ASSISTANT

## 2023-01-01 PROCEDURE — 86850 RBC ANTIBODY SCREEN: CPT | Performed by: INTERNAL MEDICINE

## 2023-01-01 PROCEDURE — 99214 OFFICE O/P EST MOD 30 MIN: CPT | Mod: 24 | Performed by: INTERNAL MEDICINE

## 2023-01-01 PROCEDURE — 36430 TRANSFUSION BLD/BLD COMPNT: CPT

## 2023-01-01 PROCEDURE — 87040 BLOOD CULTURE FOR BACTERIA: CPT | Performed by: EMERGENCY MEDICINE

## 2023-01-01 PROCEDURE — 80053 COMPREHEN METABOLIC PANEL: CPT | Performed by: PHYSICIAN ASSISTANT

## 2023-01-01 PROCEDURE — P9016 RBC LEUKOCYTES REDUCED: HCPCS | Performed by: PHYSICIAN ASSISTANT

## 2023-01-01 PROCEDURE — 80053 COMPREHEN METABOLIC PANEL: CPT | Performed by: EMERGENCY MEDICINE

## 2023-01-01 PROCEDURE — 71046 X-RAY EXAM CHEST 2 VIEWS: CPT

## 2023-01-01 PROCEDURE — 83605 ASSAY OF LACTIC ACID: CPT | Performed by: EMERGENCY MEDICINE

## 2023-01-01 PROCEDURE — C9803 HOPD COVID-19 SPEC COLLECT: HCPCS

## 2023-01-01 PROCEDURE — 87637 SARSCOV2&INF A&B&RSV AMP PRB: CPT | Performed by: EMERGENCY MEDICINE

## 2023-01-01 PROCEDURE — 99284 EMERGENCY DEPT VISIT MOD MDM: CPT | Performed by: EMERGENCY MEDICINE

## 2023-01-01 PROCEDURE — 99215 OFFICE O/P EST HI 40 MIN: CPT | Mod: 24 | Performed by: INTERNAL MEDICINE

## 2023-01-01 PROCEDURE — 93000 ELECTROCARDIOGRAM COMPLETE: CPT | Performed by: NURSE PRACTITIONER

## 2023-01-01 PROCEDURE — 250N000011 HC RX IP 250 OP 636: Performed by: EMERGENCY MEDICINE

## 2023-01-01 PROCEDURE — 80053 COMPREHEN METABOLIC PANEL: CPT | Performed by: INTERNAL MEDICINE

## 2023-01-01 PROCEDURE — 93005 ELECTROCARDIOGRAM TRACING: CPT

## 2023-01-01 PROCEDURE — 36415 COLL VENOUS BLD VENIPUNCTURE: CPT

## 2023-01-01 PROCEDURE — 84100 ASSAY OF PHOSPHORUS: CPT

## 2023-01-01 PROCEDURE — 85018 HEMOGLOBIN: CPT | Performed by: PHYSICIAN ASSISTANT

## 2023-01-01 PROCEDURE — G0463 HOSPITAL OUTPT CLINIC VISIT: HCPCS | Mod: GT | Performed by: PHYSICIAN ASSISTANT

## 2023-01-01 PROCEDURE — A9585 GADOBUTROL INJECTION: HCPCS | Performed by: PHYSICIAN ASSISTANT

## 2023-01-01 PROCEDURE — 250N000009 HC RX 250: Performed by: EMERGENCY MEDICINE

## 2023-01-01 PROCEDURE — 84145 PROCALCITONIN (PCT): CPT | Performed by: EMERGENCY MEDICINE

## 2023-01-01 PROCEDURE — 71250 CT THORAX DX C-: CPT | Mod: MG

## 2023-01-01 PROCEDURE — 99285 EMERGENCY DEPT VISIT HI MDM: CPT | Mod: 25 | Performed by: EMERGENCY MEDICINE

## 2023-01-01 PROCEDURE — 93010 ELECTROCARDIOGRAM REPORT: CPT | Performed by: PHYSICIAN ASSISTANT

## 2023-01-01 PROCEDURE — 82310 ASSAY OF CALCIUM: CPT | Performed by: EMERGENCY MEDICINE

## 2023-01-01 PROCEDURE — 255N000002 HC RX 255 OP 636: Performed by: EMERGENCY MEDICINE

## 2023-01-01 PROCEDURE — 84484 ASSAY OF TROPONIN QUANT: CPT | Performed by: EMERGENCY MEDICINE

## 2023-01-01 PROCEDURE — 84100 ASSAY OF PHOSPHORUS: CPT | Performed by: INTERNAL MEDICINE

## 2023-01-01 PROCEDURE — 99214 OFFICE O/P EST MOD 30 MIN: CPT | Mod: 95 | Performed by: PHYSICIAN ASSISTANT

## 2023-01-01 PROCEDURE — 255N000002 HC RX 255 OP 636: Performed by: PHYSICIAN ASSISTANT

## 2023-01-01 PROCEDURE — 82248 BILIRUBIN DIRECT: CPT | Performed by: EMERGENCY MEDICINE

## 2023-01-01 PROCEDURE — 84443 ASSAY THYROID STIM HORMONE: CPT | Performed by: INTERNAL MEDICINE

## 2023-01-01 PROCEDURE — 258N000003 HC RX IP 258 OP 636: Performed by: EMERGENCY MEDICINE

## 2023-01-01 PROCEDURE — 86901 BLOOD TYPING SEROLOGIC RH(D): CPT | Performed by: PHYSICIAN ASSISTANT

## 2023-01-01 PROCEDURE — 84145 PROCALCITONIN (PCT): CPT | Performed by: PHYSICIAN ASSISTANT

## 2023-01-01 PROCEDURE — 85007 BL SMEAR W/DIFF WBC COUNT: CPT | Performed by: PHYSICIAN ASSISTANT

## 2023-01-01 PROCEDURE — 36415 COLL VENOUS BLD VENIPUNCTURE: CPT | Performed by: EMERGENCY MEDICINE

## 2023-01-01 PROCEDURE — 36591 DRAW BLOOD OFF VENOUS DEVICE: CPT | Performed by: INTERNAL MEDICINE

## 2023-01-01 PROCEDURE — 85014 HEMATOCRIT: CPT | Performed by: EMERGENCY MEDICINE

## 2023-01-01 PROCEDURE — 86850 RBC ANTIBODY SCREEN: CPT | Performed by: PHYSICIAN ASSISTANT

## 2023-01-01 PROCEDURE — 85027 COMPLETE CBC AUTOMATED: CPT | Performed by: INTERNAL MEDICINE

## 2023-01-01 PROCEDURE — 83735 ASSAY OF MAGNESIUM: CPT | Performed by: EMERGENCY MEDICINE

## 2023-01-01 PROCEDURE — 250N000011 HC RX IP 250 OP 636

## 2023-01-01 PROCEDURE — 99215 OFFICE O/P EST HI 40 MIN: CPT | Mod: 95 | Performed by: PHYSICIAN ASSISTANT

## 2023-01-01 PROCEDURE — 250N000013 HC RX MED GY IP 250 OP 250 PS 637: Performed by: NURSE PRACTITIONER

## 2023-01-01 PROCEDURE — 84443 ASSAY THYROID STIM HORMONE: CPT | Performed by: PHYSICIAN ASSISTANT

## 2023-01-01 PROCEDURE — 99215 OFFICE O/P EST HI 40 MIN: CPT | Mod: VID | Performed by: INTERNAL MEDICINE

## 2023-01-01 PROCEDURE — 99215 OFFICE O/P EST HI 40 MIN: CPT | Performed by: NURSE PRACTITIONER

## 2023-01-01 PROCEDURE — 83880 ASSAY OF NATRIURETIC PEPTIDE: CPT | Performed by: PHYSICIAN ASSISTANT

## 2023-01-01 PROCEDURE — 99215 OFFICE O/P EST HI 40 MIN: CPT | Mod: VID | Performed by: PHYSICIAN ASSISTANT

## 2023-01-01 PROCEDURE — 85004 AUTOMATED DIFF WBC COUNT: CPT | Performed by: INTERNAL MEDICINE

## 2023-01-01 PROCEDURE — C9803 HOPD COVID-19 SPEC COLLECT: HCPCS | Mod: CS

## 2023-01-01 PROCEDURE — 96374 THER/PROPH/DIAG INJ IV PUSH: CPT | Mod: 59 | Performed by: EMERGENCY MEDICINE

## 2023-01-01 PROCEDURE — 93005 ELECTROCARDIOGRAM TRACING: CPT | Performed by: CLINICAL EXERCISE PHYSIOLOGIST

## 2023-01-01 PROCEDURE — 72158 MRI LUMBAR SPINE W/O & W/DYE: CPT | Mod: MA

## 2023-01-01 PROCEDURE — 72157 MRI CHEST SPINE W/O & W/DYE: CPT | Mod: MA

## 2023-01-01 PROCEDURE — 99284 EMERGENCY DEPT VISIT MOD MDM: CPT | Mod: 25

## 2023-01-01 PROCEDURE — 85004 AUTOMATED DIFF WBC COUNT: CPT | Performed by: PHYSICIAN ASSISTANT

## 2023-01-01 PROCEDURE — 85025 COMPLETE CBC W/AUTO DIFF WBC: CPT | Performed by: EMERGENCY MEDICINE

## 2023-01-01 PROCEDURE — 81003 URINALYSIS AUTO W/O SCOPE: CPT | Performed by: EMERGENCY MEDICINE

## 2023-01-01 PROCEDURE — 99417 PROLNG OP E/M EACH 15 MIN: CPT | Performed by: INTERNAL MEDICINE

## 2023-01-01 PROCEDURE — 93010 ELECTROCARDIOGRAM REPORT: CPT | Mod: HOP | Performed by: INTERNAL MEDICINE

## 2023-01-01 PROCEDURE — 85610 PROTHROMBIN TIME: CPT | Performed by: EMERGENCY MEDICINE

## 2023-01-01 PROCEDURE — 86901 BLOOD TYPING SEROLOGIC RH(D): CPT | Performed by: INTERNAL MEDICINE

## 2023-01-01 PROCEDURE — 82435 ASSAY OF BLOOD CHLORIDE: CPT | Performed by: PHYSICIAN ASSISTANT

## 2023-01-01 PROCEDURE — 36415 COLL VENOUS BLD VENIPUNCTURE: CPT | Performed by: INTERNAL MEDICINE

## 2023-01-01 PROCEDURE — 99284 EMERGENCY DEPT VISIT MOD MDM: CPT | Mod: CS

## 2023-01-01 PROCEDURE — 81001 URINALYSIS AUTO W/SCOPE: CPT | Performed by: PHYSICIAN ASSISTANT

## 2023-01-01 PROCEDURE — A9585 GADOBUTROL INJECTION: HCPCS | Performed by: EMERGENCY MEDICINE

## 2023-01-01 PROCEDURE — 81001 URINALYSIS AUTO W/SCOPE: CPT | Performed by: EMERGENCY MEDICINE

## 2023-01-01 PROCEDURE — 250N000011 HC RX IP 250 OP 636: Performed by: STUDENT IN AN ORGANIZED HEALTH CARE EDUCATION/TRAINING PROGRAM

## 2023-01-01 PROCEDURE — 250N000013 HC RX MED GY IP 250 OP 250 PS 637: Performed by: PHYSICIAN ASSISTANT

## 2023-01-01 PROCEDURE — 96375 TX/PRO/DX INJ NEW DRUG ADDON: CPT | Performed by: EMERGENCY MEDICINE

## 2023-01-01 PROCEDURE — 99214 OFFICE O/P EST MOD 30 MIN: CPT | Mod: 95 | Performed by: INTERNAL MEDICINE

## 2023-01-01 PROCEDURE — 86923 COMPATIBILITY TEST ELECTRIC: CPT | Performed by: PHYSICIAN ASSISTANT

## 2023-01-01 PROCEDURE — 87086 URINE CULTURE/COLONY COUNT: CPT | Performed by: EMERGENCY MEDICINE

## 2023-01-01 PROCEDURE — G0463 HOSPITAL OUTPT CLINIC VISIT: HCPCS | Mod: 25 | Performed by: NURSE PRACTITIONER

## 2023-01-01 PROCEDURE — 85730 THROMBOPLASTIN TIME PARTIAL: CPT | Performed by: EMERGENCY MEDICINE

## 2023-01-01 RX ORDER — ONDANSETRON 2 MG/ML
8 INJECTION INTRAMUSCULAR; INTRAVENOUS ONCE
Status: CANCELLED
Start: 2023-01-01 | End: 2023-01-01

## 2023-01-01 RX ORDER — HEPARIN SODIUM (PORCINE) LOCK FLUSH IV SOLN 100 UNIT/ML 100 UNIT/ML
5 SOLUTION INTRAVENOUS
Status: COMPLETED | OUTPATIENT
Start: 2023-01-01 | End: 2023-01-01

## 2023-01-01 RX ORDER — LOSARTAN POTASSIUM 25 MG/1
25 TABLET ORAL DAILY
Qty: 30 TABLET | Refills: 1 | Status: SHIPPED | OUTPATIENT
Start: 2023-01-01 | End: 2023-01-01

## 2023-01-01 RX ORDER — HEPARIN SODIUM (PORCINE) LOCK FLUSH IV SOLN 100 UNIT/ML 100 UNIT/ML
5 SOLUTION INTRAVENOUS
Status: CANCELLED
Start: 2023-01-01

## 2023-01-01 RX ORDER — CODEINE PHOSPHATE/GUAIFENESIN 10-100MG/5
10 LIQUID (ML) ORAL EVERY 4 HOURS PRN
Qty: 118 ML | Refills: 0 | Status: SHIPPED | OUTPATIENT
Start: 2023-01-01 | End: 2023-01-01

## 2023-01-01 RX ORDER — LIDOCAINE 50 MG/G
OINTMENT TOPICAL 4 TIMES DAILY PRN
Qty: 240 G | Refills: 1 | Status: SHIPPED | OUTPATIENT
Start: 2023-01-01 | End: 2023-01-01

## 2023-01-01 RX ORDER — LEVOFLOXACIN 500 MG/1
500 TABLET, FILM COATED ORAL DAILY
Qty: 7 TABLET | Refills: 0 | Status: SHIPPED | OUTPATIENT
Start: 2023-01-01 | End: 2023-01-01

## 2023-01-01 RX ORDER — HEPARIN SODIUM (PORCINE) LOCK FLUSH IV SOLN 100 UNIT/ML 100 UNIT/ML
5 SOLUTION INTRAVENOUS
Status: CANCELLED | OUTPATIENT
Start: 2023-01-01

## 2023-01-01 RX ORDER — GADOBUTROL 604.72 MG/ML
6 INJECTION INTRAVENOUS ONCE
Status: COMPLETED | OUTPATIENT
Start: 2023-01-01 | End: 2023-01-01

## 2023-01-01 RX ORDER — LEVOTHYROXINE SODIUM 75 UG/1
TABLET ORAL
Qty: 90 TABLET | Refills: 2 | Status: SHIPPED | OUTPATIENT
Start: 2023-01-01

## 2023-01-01 RX ORDER — PREDNISONE 20 MG/1
TABLET ORAL
Qty: 6 TABLET | Refills: 0 | Status: SHIPPED | OUTPATIENT
Start: 2023-01-01 | End: 2023-01-01

## 2023-01-01 RX ORDER — METHADONE HYDROCHLORIDE 5 MG/1
2.5 TABLET ORAL 3 TIMES DAILY
Qty: 45 TABLET | Refills: 0 | Status: SHIPPED | OUTPATIENT
Start: 2023-01-01 | End: 2023-01-01

## 2023-01-01 RX ORDER — HEPARIN SODIUM,PORCINE 10 UNIT/ML
5 VIAL (ML) INTRAVENOUS
Status: CANCELLED | OUTPATIENT
Start: 2023-01-01

## 2023-01-01 RX ORDER — HEPARIN SODIUM (PORCINE) LOCK FLUSH IV SOLN 100 UNIT/ML 100 UNIT/ML
SOLUTION INTRAVENOUS
Status: COMPLETED
Start: 2023-01-01 | End: 2023-01-01

## 2023-01-01 RX ORDER — AMLODIPINE BESYLATE 5 MG/1
5 TABLET ORAL DAILY
Qty: 30 TABLET | Refills: 0 | COMMUNITY
Start: 2023-01-01 | End: 2023-01-01

## 2023-01-01 RX ORDER — OXYCODONE HYDROCHLORIDE 5 MG/1
5-10 TABLET ORAL EVERY 4 HOURS PRN
Qty: 100 TABLET | Refills: 0 | Status: SHIPPED | OUTPATIENT
Start: 2023-01-01 | End: 2023-01-01

## 2023-01-01 RX ORDER — LISINOPRIL 20 MG/1
20 TABLET ORAL DAILY
Qty: 30 TABLET | Refills: 0 | COMMUNITY
Start: 2023-01-01 | End: 2023-01-01

## 2023-01-01 RX ORDER — LIDOCAINE 50 MG/G
OINTMENT TOPICAL 4 TIMES DAILY PRN
Qty: 240 G | Refills: 1 | Status: SHIPPED | OUTPATIENT
Start: 2023-01-01

## 2023-01-01 RX ORDER — PAZOPANIB 200 MG/1
800 TABLET, FILM COATED ORAL DAILY
Qty: 120 TABLET | Refills: 11 | Status: SHIPPED | OUTPATIENT
Start: 2023-01-01 | End: 2023-01-01

## 2023-01-01 RX ORDER — HEPARIN SODIUM (PORCINE) LOCK FLUSH IV SOLN 100 UNIT/ML 100 UNIT/ML
5 SOLUTION INTRAVENOUS
Status: DISCONTINUED | OUTPATIENT
Start: 2023-01-01 | End: 2023-01-01 | Stop reason: HOSPADM

## 2023-01-01 RX ORDER — NYSTATIN 100000/ML
500000 SUSPENSION, ORAL (FINAL DOSE FORM) ORAL 4 TIMES DAILY
Qty: 473 ML | Refills: 1 | Status: SHIPPED | OUTPATIENT
Start: 2023-01-01 | End: 2023-01-01

## 2023-01-01 RX ORDER — ACETAMINOPHEN 325 MG/1
975 TABLET ORAL ONCE
Status: COMPLETED | OUTPATIENT
Start: 2023-01-01 | End: 2023-01-01

## 2023-01-01 RX ORDER — OXYCODONE HYDROCHLORIDE 5 MG/1
5 TABLET ORAL EVERY 4 HOURS PRN
Qty: 60 TABLET | Refills: 0 | Status: SHIPPED | OUTPATIENT
Start: 2023-01-01 | End: 2023-01-01

## 2023-01-01 RX ORDER — IOPAMIDOL 755 MG/ML
68 INJECTION, SOLUTION INTRAVASCULAR ONCE
Status: COMPLETED | OUTPATIENT
Start: 2023-01-01 | End: 2023-01-01

## 2023-01-01 RX ORDER — FLUCONAZOLE 100 MG/1
100 TABLET ORAL DAILY
Qty: 7 TABLET | Refills: 0 | Status: SHIPPED | OUTPATIENT
Start: 2023-01-01 | End: 2023-01-01

## 2023-01-01 RX ORDER — ESCITALOPRAM OXALATE 10 MG/1
10 TABLET ORAL DAILY
Qty: 30 TABLET | Refills: 0 | COMMUNITY
Start: 2023-01-01

## 2023-01-01 RX ORDER — HYDROCORTISONE 10 MG/1
TABLET ORAL
Qty: 270 TABLET | Refills: 2 | Status: SHIPPED | OUTPATIENT
Start: 2023-01-01

## 2023-01-01 RX ORDER — OLANZAPINE 5 MG/1
5 TABLET ORAL 2 TIMES DAILY PRN
Qty: 60 TABLET | Refills: 1 | Status: SHIPPED | OUTPATIENT
Start: 2023-01-01 | End: 2023-01-01

## 2023-01-01 RX ORDER — DEXAMETHASONE 4 MG/1
8 TABLET ORAL
Qty: 60 TABLET | Refills: 0 | Status: SHIPPED | OUTPATIENT
Start: 2023-01-01 | End: 2023-01-01

## 2023-01-01 RX ORDER — HEPARIN SODIUM (PORCINE) LOCK FLUSH IV SOLN 100 UNIT/ML 100 UNIT/ML
5-10 SOLUTION INTRAVENOUS
Status: DISCONTINUED | OUTPATIENT
Start: 2023-01-01 | End: 2023-01-01 | Stop reason: HOSPADM

## 2023-01-01 RX ORDER — LORAZEPAM 0.5 MG/1
TABLET ORAL
Qty: 2 TABLET | Refills: 0 | Status: SHIPPED | OUTPATIENT
Start: 2023-01-01

## 2023-01-01 RX ORDER — DEXAMETHASONE 4 MG/1
4 TABLET ORAL
Qty: 60 TABLET | Refills: 0 | COMMUNITY
Start: 2023-01-01

## 2023-01-01 RX ORDER — OXYCODONE HYDROCHLORIDE 5 MG/1
5-10 TABLET ORAL EVERY 4 HOURS PRN
Qty: 100 TABLET | Refills: 0 | Status: SHIPPED | OUTPATIENT
Start: 2023-01-01

## 2023-01-01 RX ORDER — OLANZAPINE 5 MG/1
5 TABLET ORAL AT BEDTIME
Qty: 90 TABLET | Refills: 3 | Status: SHIPPED | OUTPATIENT
Start: 2023-01-01

## 2023-01-01 RX ORDER — CODEINE PHOSPHATE/GUAIFENESIN 10-100MG/5
10 LIQUID (ML) ORAL EVERY 4 HOURS PRN
Qty: 118 ML | Refills: 0 | Status: SHIPPED | OUTPATIENT
Start: 2023-01-01

## 2023-01-01 RX ORDER — ACETAMINOPHEN 325 MG/1
650 TABLET ORAL ONCE
Status: COMPLETED | OUTPATIENT
Start: 2023-01-01 | End: 2023-01-01

## 2023-01-01 RX ORDER — SODIUM CHLORIDE 9 MG/ML
INJECTION, SOLUTION INTRAVENOUS CONTINUOUS PRN
Status: DISCONTINUED | OUTPATIENT
Start: 2023-01-01 | End: 2023-01-01 | Stop reason: HOSPADM

## 2023-01-01 RX ORDER — LOSARTAN POTASSIUM 25 MG/1
50 TABLET ORAL DAILY
Qty: 60 TABLET | Refills: 3 | Status: SHIPPED | OUTPATIENT
Start: 2023-01-01

## 2023-01-01 RX ORDER — DIAZEPAM 10 MG/2ML
5 INJECTION, SOLUTION INTRAMUSCULAR; INTRAVENOUS ONCE
Status: COMPLETED | OUTPATIENT
Start: 2023-01-01 | End: 2023-01-01

## 2023-01-01 RX ORDER — PAZOPANIB 200 MG/1
800 TABLET, FILM COATED ORAL DAILY
Refills: 0 | COMMUNITY
Start: 2023-01-01 | End: 2023-01-01

## 2023-01-01 RX ADMIN — Medication 5 ML: at 11:26

## 2023-01-01 RX ADMIN — SODIUM CHLORIDE 1000 ML: 9 INJECTION, SOLUTION INTRAVENOUS at 09:36

## 2023-01-01 RX ADMIN — Medication 5 ML: at 11:13

## 2023-01-01 RX ADMIN — DIAZEPAM 5 MG: 5 INJECTION, SOLUTION INTRAMUSCULAR; INTRAVENOUS at 23:26

## 2023-01-01 RX ADMIN — Medication 5 ML: at 10:53

## 2023-01-01 RX ADMIN — Medication 5 ML: at 11:43

## 2023-01-01 RX ADMIN — Medication 5 ML: at 11:05

## 2023-01-01 RX ADMIN — SODIUM CHLORIDE 1000 ML: 9 INJECTION, SOLUTION INTRAVENOUS at 10:05

## 2023-01-01 RX ADMIN — SODIUM CHLORIDE 1000 ML: 9 INJECTION, SOLUTION INTRAVENOUS at 10:00

## 2023-01-01 RX ADMIN — Medication 5 ML: at 22:59

## 2023-01-01 RX ADMIN — Medication 5 ML: at 11:41

## 2023-01-01 RX ADMIN — GADOBUTROL 6 ML: 604.72 INJECTION INTRAVENOUS at 23:48

## 2023-01-01 RX ADMIN — SODIUM CHLORIDE 100 ML: 9 INJECTION, SOLUTION INTRAVENOUS at 22:16

## 2023-01-01 RX ADMIN — Medication 5 ML: at 12:20

## 2023-01-01 RX ADMIN — ACETAMINOPHEN 650 MG: 325 TABLET, FILM COATED ORAL at 10:17

## 2023-01-01 RX ADMIN — GADOBUTROL 6 ML: 604.72 INJECTION INTRAVENOUS at 20:03

## 2023-01-01 RX ADMIN — SODIUM CHLORIDE 1000 ML: 9 INJECTION, SOLUTION INTRAVENOUS at 10:29

## 2023-01-01 RX ADMIN — Medication 5 ML: at 13:07

## 2023-01-01 RX ADMIN — Medication 5 ML: at 13:03

## 2023-01-01 RX ADMIN — Medication 5 ML: at 14:38

## 2023-01-01 RX ADMIN — SODIUM CHLORIDE 1000 ML: 9 INJECTION, SOLUTION INTRAVENOUS at 11:59

## 2023-01-01 RX ADMIN — SODIUM CHLORIDE 1000 ML: 9 INJECTION, SOLUTION INTRAVENOUS at 10:39

## 2023-01-01 RX ADMIN — IOPAMIDOL 68 ML: 755 INJECTION, SOLUTION INTRAVENOUS at 22:16

## 2023-01-01 RX ADMIN — SODIUM CHLORIDE 1000 ML: 9 INJECTION, SOLUTION INTRAVENOUS at 09:51

## 2023-01-01 RX ADMIN — Medication 5 ML: at 11:07

## 2023-01-01 RX ADMIN — SODIUM CHLORIDE 1000 ML: 9 INJECTION, SOLUTION INTRAVENOUS at 13:27

## 2023-01-01 RX ADMIN — ACETAMINOPHEN 975 MG: 325 TABLET, FILM COATED ORAL at 10:17

## 2023-01-01 RX ADMIN — SODIUM CHLORIDE 1000 ML: 9 INJECTION, SOLUTION INTRAVENOUS at 10:06

## 2023-01-01 RX ADMIN — Medication 5 ML: at 11:17

## 2023-01-01 RX ADMIN — Medication 5 ML: at 11:04

## 2023-01-01 RX ADMIN — Medication 5 ML: at 11:08

## 2023-01-01 RX ADMIN — SODIUM CHLORIDE 1000 ML: 9 INJECTION, SOLUTION INTRAVENOUS at 10:01

## 2023-01-01 RX ADMIN — SODIUM CHLORIDE 1000 ML: 9 INJECTION, SOLUTION INTRAVENOUS at 11:05

## 2023-01-01 RX ADMIN — Medication 500 UNITS: at 11:20

## 2023-01-01 RX ADMIN — SODIUM CHLORIDE 1000 ML: 9 INJECTION, SOLUTION INTRAVENOUS at 09:58

## 2023-01-01 RX ADMIN — SODIUM CHLORIDE 1000 ML: 9 INJECTION, SOLUTION INTRAVENOUS at 16:25

## 2023-01-01 RX ADMIN — SODIUM CHLORIDE 1000 ML: 9 INJECTION, SOLUTION INTRAVENOUS at 10:14

## 2023-01-01 RX ADMIN — Medication 5 ML: at 11:12

## 2023-01-01 RX ADMIN — SODIUM CHLORIDE 1000 ML: 9 INJECTION, SOLUTION INTRAVENOUS at 10:07

## 2023-01-01 ASSESSMENT — PAIN SCALES - GENERAL
PAINLEVEL: NO PAIN (1)
PAINLEVEL: NO PAIN (0)
PAINLEVEL: MILD PAIN (2)

## 2023-01-01 ASSESSMENT — ACTIVITIES OF DAILY LIVING (ADL)
ADLS_ACUITY_SCORE: 33
ADLS_ACUITY_SCORE: 33
ADLS_ACUITY_SCORE: 35

## 2023-01-03 NOTE — PROGRESS NOTES
[facility]    Background: Transitional Care Management program identified per system criteria and reviewed by [facility] team for possible outreach.    Assessment: Upon chart review, CCR Team member will not proceed with patient outreach related to this episode of Transitional Care Management program due to reason below:    Patient has active communication with a nurse, provider or care team for reason of post-hospital follow up plan.  Outreach call by CCR team not indicated to minimize duplicative efforts.     Plan: Transitional Care Management episode addressed appropriately per reason noted above.      DEVIKA Erickson  [facility]    *Connected Care Resource Team does NOT follow patient ongoing. Referrals are identified based on internal discharge reports and the outreach is to ensure patient has an understanding of their discharge instructions.

## 2023-01-04 NOTE — PROGRESS NOTES
Clinic Care Coordination Contact  [facility]/Voicemail       Clinical Data: Care Coordinator Outreach  Outreach attempted x 1. Unable to leave message as voicemail is full.   Plan: Care Coordinator will try to reach patient again in 1-2 business days.    Anali Camarillo RN Care Coordination   [facility]  Email: [email or web address]  Phone: [phone]

## 2023-01-04 NOTE — LETTER
[facility] CARE COORDINATION  [address]              [place] [address]    January 6, 2023    Ifrah Huitron  [address] [place] [address]      Dear Ifrah,      I am a clinic care coordinator who works with Sukhdev Kohler MD with the [facility]. I wanted to thank you for spending the time to talk with me.  Below is a description of clinic care coordination and how I can further assist you.       The clinic care coordination team is made up of a registered nurse, , financial resource worker and community health worker who understand the health care system. The goal of clinic care coordination is to help you manage your health and improve access to the health care system. Our team works alongside your provider to assist you in determining your health and social needs. We can help you obtain health care and community resources, providing you with necessary information and education. We can work with you through any barriers and develop a care plan that helps coordinate and strengthen the communication between you and your care team.    Please feel free to contact me with any questions or concerns regarding care coordination and what we can offer.      We are focused on providing you with the highest-quality healthcare experience possible.    Sincerely,     Anali Camarillo RN Care Coordination   [facility]  Phone: [phone]

## 2023-01-05 NOTE — PROGRESS NOTES
"Oncology Rooming Note    January 5, 2023 11:36 AM   Ifrah Huitron is a 72 year old male who presents for:    Chief Complaint   Patient presents with     Oncology Clinic Visit     Sarcoma (H)     Initial Vitals: BP (!) 150/76   Pulse 95   Temp 98.7  F (37.1  C)   Resp 18   Wt 61.5 kg (135 lb 8 oz)   SpO2 96%   BMI 21.22 kg/m   Estimated body mass index is 21.22 kg/m  as calculated from the following:    Height as of 12/27/22: 1.702 m (5' 7\").    Weight as of this encounter: 61.5 kg (135 lb 8 oz). Body surface area is 1.71 meters squared.  No Pain (1) Comment: Data Unavailable   No LMP for male patient.  Allergies reviewed: Yes  Medications reviewed: Yes    Medications: Medication refills not needed today.  Pharmacy name entered into [employer]:    [facility] - [place], [place] - [address] [facility] [place], [place] - [facility] - [place], [place] - [address]    Clinical concerns: None       Matilde Renee LPN            " stated

## 2023-01-05 NOTE — PROGRESS NOTES
[facility] Hematology and Oncology Outpatient Progress Note    Patient: Ifrah Huitron  MRN: [id number]  Date of Service: Jan 5, 2023          Reason for Visit    1. Spindle cell sarcoma  2. Post-hospital follow-up (sepsis, acute/chronic anemia, obstructive cholelithiasis post lap-carmelita)    Primary Oncologist: Dr. Wolff (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  Has not tolerated prior chemotherapy well. Most recently received Trabectedin x 1 cycle six weeks ago; complicated by HUSSEIN, hepatic toxicity, nausea, anorexia, dehydration, pancytopenia. Further chemo was held in early December to allow more recovery time.     He has since been rehospitalized last week for sepsis and acute anemia. Sepsis resolved with IV antibiotics. No bleeding source identified. He had interval lap carmelita for obstructive cholelithiasis almost two weeks ago.    He's been home for almost a week. Doing generally well. Lap incision sites well-healed.   PS 1-2.     Recent CT: slight progression in splenic mass, several new lung nodules and stable mediastinal mass.    Plan recommended by Dr. Wolff and Maynor CHOW is to start next line plan is to start him on Pazopanib 800 mg daily. He's received insurance approval and is expecting delivery of drug tomorrow.  He's received education on the toxicities of the drug and desires proceeding.    Baseline labs: improving LFTs (Alk phos remains elevated, but improving), anemia back to baseline and rest of CBC normal.   Baseline EKG is NSR, no QTc prolongation.      Plan:  -Initiate Pazopanib 800 mg daily on Sunday 1/7. This will allow 2 weeks from his lap carmelita for wound healing.  -He has stopped PPI in preporation to starting due to drug:drug interactions  -Weekly CBC, CMP x 4, then every 2 weeks x 2, then monthly. TSH monthly. He can do this in [place] when in for IVF  -Weekly IVF in  [place]  -EKG in 2 weeks, then monthly  -Follow-up virtually with Maynor Rios in 2 weeks for toxicity eval  -He can see me in [place] or [facility] for face:face assessments when this is needed    #  Chronic/recurrent mucositis  Ongoing throughout all of treatment. Improving off chemo, but risk to exacerbate on new therapy. Continue Doxepin, MMW, salt/soda, Nystatin.     # Poor Oral Intake  # Dehydration  # HUSSEIN, improved  # Hypokalemia  # Hypophosphatemia  Was requiring IVF 1-2 days/week in [place]. Creatinine is up a bit this week (1.4) since his dismissal creatinine. He's still struggling some with his oral intake, so this is likely pre-renal. Recent renal US normal. Continues phos 250mg BID and potassium 20meq daily, lytes in normal range.     Plan:  -IVF weekly (can go up to twice weekly if needed) in [place]. Scheduled tomorrow  -Monitor lytes weekly x 4, then every 2 weeks x 2 upon starting Pazopanib.    # Acute LFT elevation with hyperbilirubinemia, resolving  # Obstructive cholelithiasis, s/p lap carmelita  He is 12 days post lap-carmelita. Alk phos declining and transaminases + bili WNL.    Plan:  -Risk of hepotoxicity on Pazopanib. Monitor LFTs closely (weekly the first month)    # Chemo-related pancytopenia, resolved  # Acute/chronic anemia  2/2 chemotherapy. Recent hospitalization, noted to have acute anemia 6.4 (baseline 7-8). No bleeding source identified on ERCP, EGD and CT abd. Surgery felt unlikely related to post-op loss following lap carmelita. Stabilized after pRBC. Holding Celebrex/NSAIDs. Could have been related to acute infectious process/reactive with sepsis. Today, this is stabilized back to his baseline.     Plan:  -Monitor. Transfusion parameters: <7.0-7.5 as needed    # Superficial Phlebitis, resolved  RUE on US 11/30/22. Continue warm compress, improving. Avoid anticoagulation with thrombocytopenia and no DVT. Symptoms are resolved.     # Cancer related pain  Increased pain left flank  area related to progression of the splenic mass. Well-managed on topical lidocaine and oxycodone  Mg once - twice/day.  Was on Celebrex, but on hold with HUSSEIN and recent anemia concerning for possible bleed.     # Hypopituitarism  # Hypothyroidism  Continues Hydrocortisone and Synthroid. Stress doses hydrocortisone with acute illness.  Follows with Endo.   Baseline TSH before starting pazopanib pending. Pazopanib can exacerbate thyroid dysfunction, will be followed.    #   Nausea, dyspepsia  Continues TUMS prn and Zofran PRN. Holding PPI in anticipation of starting pazopanib.    #   Hoarseness  Likely secondary to mediastinal mass. Status post vocal cord injection in July with ENT    #  Depression/anxiety  On Lexpro     #  Deconditioning  PT referral at [place]      ______________________________________________________________________________    History of Present Illness/ Interval History    Mr. Ifrah Huitron  is a 72 year old on palliative chemo (multiple prior lines) for sarcoma, managed by Dr. Wolff and Maynor CHOW. He had a number of toxicities after one dose of Trabectedin 6 weeks ago and subsequent chemo has been on hold. He's here to reassess post-hospital, ahead of starting next line pazopanib.     He's had a few hospitalizations this last month. He was admitted two weeks ago for cholangitis, sepsis and acute anemia. He underwent lap carmelita (12/24) and antibiotics with improvement. He required blood transfusion and work-up was negative for bleeding source. He's been home x 1 week and feels he's steadily improving. Still struggling with dysgeusia and no appetite, so having a hard time finding palatable foods. Fair oral hydration. No nausea. Bowels regular. No fevers. Mild mucositis, is improved off chemo.     He has been having more left flank pain. CT notes some progression of large splenic tumor, no other etiology. He is getting good relief with topical lidocaine to area and oxycodone 5 mg  BID.    He feels generally deconditioned and weak over the last few months.     ECOG Performance    1-2      Oncology History/Treatment  Diagnosis/Stage:   3/2021: Metastatic spindle cell sarcoma involving mediastinum, sub-diaphragm, lung  -presented to PCP with left shoulder/chest/back pain  -imaging: subdiaphragmatic mass, liver lesions, mediastinal mass with multiple lung mets  -mediastinal mass biopsy: spindle cell sarcoma. 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 Complicated by nausea and recurrent thrush  -C4: same dose reduction. Complicated by significant neurotoxicity, so only received 5.5 days. Symptoms resolved after stopping ifosfamide.   -CT showed response after 4 cycles    -3/10/2022 - 7/12/2022: single agent Doxil, until progression.   -C5 10% dose-reduced  -C6 additional 10% (20% total) dose-reduced  -8/8/2022: CT showed progression    8/10/2022 - 10/6/2022: gemcitabine (days 1, 8 every 21 d) x 3 cycles, until progression  -complicated by edema and cytopenias    11/15/2022: Trabectedin x 1 cycle, discontinued for toxicity  -complicated by admission for HUSSEIN, acute hepatic toxicity and cytopenias requiring transfusion support    1/7/2022: Pazopanib 800 mg daily (planning to initiate)      Physical Exam    GENERAL: Alert and oriented to time place and person. Seated comfortably. In no distress. Chronically ill-appearing. Alone.  HEAD: Atraumatic and normocephalic. Partial alopecia.  EYES: LANCE, EOMI. No erythema. No icterus.  ORAL CAVITY: Moist. Tongue normal, no candidiasis.   CHEST: clear to auscultation bilaterally.  Resonant to percussion throughout bilaterally. Symmetrical breath movements bilaterally.  CVS:  Regular rate and rhythm.   ABDOMEN: Soft. Not tender. Not distended. No palpable hepatomegaly or splenomegaly. No other mass palpable. Bowel sounds present. RUQ and periumbilical lap incisions approximated with resolving hematomas.   EXTREMITIES: No edema.  SKIN: No rash. No jaundice.  NEURO: No gross deficit noted. Non-antalgic gait.      Lab Results    Recent Results (from the past 168 hour(s))   Magnesium   Result Value Ref Range    Magnesium 2.1 1.7 - 2.3 mg/dL   Potassium   Result Value Ref Range    Potassium 3.6 3.4 - 5.3 mmol/L   CBC with platelets   Result Value Ref Range    WBC Count 10.4 4.0 - 11.0 10e3/uL    RBC Count 2.45 (L) 4.40 - 5.90 10e6/uL    Hemoglobin 7.6 (L) 13.3 - 17.7 g/dL    Hematocrit 23.6 (L) 40.0 - 53.0 %    MCV 96 78 - 100 fL    MCH 31.0 26.5 - 33.0 pg    MCHC 32.2 31.5 - 36.5 g/dL    RDW 17.5 (H) 10.0 - 15.0 %    Platelet Count 111 (L) 150 - 450 10e3/uL   CBC with platelets   Result Value Ref Range    WBC Count 10.7 4.0 - 11.0 10e3/uL    RBC Count 2.75 (L) 4.40 - 5.90 10e6/uL    Hemoglobin 8.5 (L) 13.3 - 17.7 g/dL    Hematocrit 26.5 (L) 40.0 - 53.0 %    MCV 96 78 - 100 fL    MCH 30.9 26.5 - 33.0 pg    MCHC 32.1 31.5 - 36.5 g/dL    RDW 17.5 (H) 10.0 - 15.0 %    Platelet Count 157 150 - 450 10e3/uL   Magnesium   Result Value Ref Range    Magnesium 1.8 1.7 - 2.3 mg/dL   Comprehensive metabolic panel   Result Value Ref Range    Sodium 138 136 - 145 mmol/L    Potassium 3.4 3.4 - 5.3 mmol/L    Chloride 101 98 - 107 mmol/L    Carbon Dioxide (CO2) 27 22 - 29 mmol/L    Anion Gap 10 7 - 15 mmol/L    Urea Nitrogen 25.2 (H) 8.0 - 23.0 mg/dL    Creatinine 1.41 (H) 0.67 - 1.17 mg/dL    Calcium 9.1 8.8 - 10.2 mg/dL    Glucose 105 (H) 70 - 99 mg/dL    Alkaline Phosphatase 242 (H) 40 - 129 U/L    AST 26 10 - 50 U/L    ALT 30 10 - 50 U/L    Protein Total 5.7 (L) 6.4 - 8.3 g/dL    Albumin 3.3 (L) 3.5 - 5.2 g/dL     Bilirubin Total 0.4 <=1.2 mg/dL    GFR Estimate 52 (L) >60 mL/min/1.73m2   Phosphorus   Result Value Ref Range    Phosphorus 3.0 2.5 - 4.5 mg/dL   TSH with free T4 reflex   Result Value Ref Range    TSH 0.56 0.30 - 4.20 uIU/mL   EKG 12-lead complete with read (future)   Result Value Ref Range    Systolic Blood Pressure  mmHg    Diastolic Blood Pressure  mmHg    Ventricular Rate 83 BPM    Atrial Rate 83 BPM    [place] Interval 118 ms    QRS Duration 72 ms     ms    QTc 415 ms    P Axis 61 degrees    R AXIS -25 degrees    T Axis 20 degrees    Interpretation ECG       Sinus rhythm  Normal ECG  When compared with ECG of 17-SEP-2021 14:33,  Premature atrial complexes are no longer Present         Imaging    CT Chest/Abdomen/Pelvis w Contrast    Result Date: 12/22/2022  CT CHEST/ABDOMEN/PELVIS WITH CONTRAST 12/22/2022 2:13 PM CLINICAL HISTORY: Near syncopal spell. Fever, weakness, elevated liver enzymes. 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 Last imaging 11/7/2022. Evaluate for interval change. TECHNIQUE: CT scan of the chest, abdomen, and pelvis was performed following injection of IV contrast. Multiplanar reformats were obtained. Dose reduction techniques were used. CONTRAST: 68 mL Isovue 370 COMPARISON: CT chest, abdomen and pelvis 11/7/2022. FINDINGS: LUNGS AND PLEURA: No effusions. No pneumothorax. New indeterminant 0.5 cm pulmonary nodule medial right lower lobe series 3 image 184. New subpleural right upper lobe lateral nodule measuring 0.2 cm, image 85. There are multiple additional examples of new small nodules bilaterally, some with a groundglass character, for example, image 126 and others appearing small and solid clustered together, image 165. MEDIASTINUM/AXILLAE: Stable mass at the anterior mediastinum and medial left upper lobe region that is 3.7 x 2.7 cm series 2 image 62. Right chest Port-A-Cath. Stable small mediastinal lymph  nodes. No acute mediastinal abnormality. Stable small nodule at the anterior left pericardial fat measuring 0.7 cm image 85. CORONARY ARTERY CALCIFICATION: Mild. HEPATOBILIARY: Several small hypodensities in the liver are stable. Some are suggestive of cysts, but others are too small for characterization. No new liver lesion identified. Gallbladder shows no acute abnormality. PANCREAS: Normal. SPLEEN: Lobulated splenic mass is slightly larger measuring 9.3 x 8.4 cm, previously 8.9 x 7.9 cm image 129. This contacts the posterior left hemidiaphragm. A portion of this also contacts the upper left kidney. ADRENAL GLANDS: Normal. KIDNEYS/BLADDER: Stable bilateral renal cysts without specific imaging follow-up recommended. No hydronephrosis or stone. Bladder is unremarkable. BOWEL: Colonic diverticula. No acute bowel abnormality. Normal appendix. PELVIC ORGANS: Prostate is 4.9 cm. ADDITIONAL FINDINGS: No new adenopathy identified. MUSCULOSKELETAL: Stable small sclerotic foci within the bilateral pelvis, right femoral neck, left femoral head. No new bone lesion identified.     IMPRESSION: 1.  Several bilateral new pulmonary nodules identified. These are indeterminant. New pulmonary metastatic disease is a possibility, though this could be infectious or inflammatory nodules. As such, recommend short interval follow-up CT chest in three months. 2.  Slightly larger mass involving the spleen and contacting the adjacent left posterior hemidiaphragm and left upper kidney. 3.  Stable size of an anterior mediastinal mass. 4.  Small hypodensities in the liver are stable. Many of these appear to represent cysts, but others are too small for characterization. AMBER JASON MD   SYSTEM ID:  [id number]    US Renal Complete Non-Vascular    Result Date: 12/27/2022  EXAM: US RENAL COMPLETE NON-VASCULAR LOCATION: [facility] DATE/TIME: 12/27/2022 5:50 PM INDICATION: left flank pain, eval for hydro COMPARISON: CT  12/26/2022 TECHNIQUE: Routine Bilateral Renal and Bladder Ultrasound. FINDINGS: RIGHT KIDNEY: 10.8 x 5.2 x 5.5 cm. Simple upper pole cyst measuring 1.0 cm. No follow-up is indicated. No hydronephrosis or shadowing stone. LEFT KIDNEY: 10.9 x 4.9 x 4.8 cm. Simple mid to upper pole cortical cysts with the larger measuring 1.3 cm. No hydronephrosis or shadowing stone. BLADDER: Normal.     IMPRESSION: 1.  No evidence of hydronephrosis. 2.  Small simple renal cortical cysts. No follow-up is indicated.    Abdomen MRI w & w/o contrast mrcp    Result Date: 12/22/2022  EXAM: MR ABDOMEN MRCP W/O and W CONTRAST LOCATION: [facility] DATE/TIME: 12/22/2022 5:49 PM INDICATION: Elevated liver enzymes. Abnormal ultrasound on 11/30/2022. Fever, history of metastatic spindle cell carcinoma. Evaluate for acute hepatobiliary process. COMPARISON: CT of chest, abdomen and pelvis 12/22/2022. Ultrasound abdomen 11/30/2022. TECHNIQUE: Routine MR liver/pancreas protocol including axial and coronal MRCP sequences. 2D and 3D reconstruction performed by MR technologist including MIP reconstruction and slab cholangiograms. If performed with contrast, additional dynamic T1 post IV contrast images. CONTRAST: 7 mL Gadavist. FINDINGS: MRCP: Gallbladder sludge identified. No convincing acute inflammatory change of the gallbladder wall on the supplied MRI images. There is mild biliary ductal dilatation diffusely. Common bile duct is 9 mm. There is small ill-defined low signal within the  distal common bile duct for example series 3, image 19 and on series 4, image 20. Also see series 6, image 10. LIVER: There are multiple right and left hepatic simple cysts that are very small in size without worrisome features. No worrisome hepatic lesion identified. PANCREAS: No solid pancreas lesion. The T2-weighted images show a few tiny cystic pancreas lesions without worrisome features. One of these at the uncinate measures 0.3 cm  series 3, image 23. Another at the pancreas tail measures 0.3 cm series 3, image 25. No main pancreatic duct dilatation. ADDITIONAL FINDINGS: Large lobulated mass occupying the spleen again noted and described on the comparison CT. It measures approximately 9 x 8.1 cm and contacts the posterior left hemidiaphragm, and contacts the upper left kidney. Multiple bilateral renal cysts appear simple without specific imaging follow-up recommended. No hydronephrosis. Normal adrenals.     IMPRESSION: 1.  Mild biliary ductal dilatation. Small region of low signal at the distal common bile duct could represent gallbladder sludge extending to this region. There is gallbladder sludge noted within the gallbladder lumen as well. The common bile duct measures 9 mm. 2.  A few tiny cystic lesions at the pancreas suggesting small cystic neoplasms. One year follow-up MRI is recommended for surveillance. No pancreas duct dilatation. 3.  Lobulated mass at the spleen consistent with malignancy again identified. 4.  Several small simple hepatic cysts without worrisome features.     CT Abdomen Pelvis w Contrast    Result Date: 12/26/2022  CT ABDOMEN AND PELVIS WITH CONTRAST 12/26/2022 3:18 PM CLINICAL HISTORY: Left upper quadrant pain. Recent laparoscopic cholecystectomy. 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 TECHNIQUE: CT scan of the abdomen and pelvis was performed following injection of IV contrast. Multiplanar reformats were obtained. Dose reduction techniques were used. CONTRAST: 61mL of Isovue 370 COMPARISON: CT of the chest, abdomen, and pelvis performed 12/22/2022. FINDINGS: LOWER CHEST: The visualized lung bases are clear. Small hiatal hernia. HEPATOBILIARY: Several small indeterminate hypodensities in the liver are too small to characterize, but are unchanged. Interval postoperative changes of cholecystectomy. Pneumobilia is new since the previous exam, and may be related to a recent ERCP.  PANCREAS: Normal. SPLEEN: Hypoenhancing mass within the spleen superiorly and posteriorly has not significantly changed, measured at 9.1 x 8.7 cm. This mass again abuts the left hemidiaphragm posteriorly as well as the upper pole of the left kidney. ADRENAL GLANDS: Normal. KIDNEYS/BLADDER: Several bilateral renal cysts are unchanged, and would require no specific follow-up. The kidneys are otherwise unremarkable. No hydronephrosis. BOWEL: No bowel obstruction. Scattered sigmoid diverticulosis. No convincing evidence for colitis or diverticulitis. Unremarkable appendix. PELVIC ORGANS: Mild prostatic enlargement. Small amount of nonspecific free fluid in the pelvis, new since the previous exam. LYMPH NODES: No enlarged lymph nodes are identified in the abdomen or pelvis. VASCULATURE: Mild luminal narrowing in the proximal SMA is not significantly changed (series 4 image 42). ADDITIONAL FINDINGS: Free intraperitoneal air anteriorly is likely related to the recent laparoscopic cholecystectomy. MUSCULOSKELETAL: No destructive bony lesions are identified.     IMPRESSION: 1.  Hypoenhancing splenic mass has not significantly changed, measuring 9.1 cm, and is again noted to abut the left hemidiaphragm and upper left kidney. 2.  Interval postoperative changes of laparoscopic cholecystectomy are noted. Free intraperitoneal air is also likely postoperative. 3.  Pneumobilia is new since the previous exam, and likely related to a recent ERCP. 4.  Small amount of nonspecific free fluid in the pelvis is new since the previous exam. 5.  Mild luminal narrowing in the proximal SMA is unchanged, and is of uncertain clinical significance. LELIA RODRIGUEZ MD   SYSTEM ID:  [id number]    XR Surgery LO Fluoro G/T 5 Min w Stills    Result Date: 12/23/2022  EXAM: XR SURGERY LO FLUORO GREATER THAN 5 MIN W STILLS LOCATION: [facility] DATE/TIME: 12/23/2022 2:54 PM INDICATION: intra op COMPARISON: MRCP 12/22/2022  TECHNIQUE: Exam performed by gastroenterologist. FLUOROSCOPIC TIME: 2.8 minutes NUMBER OF IMAGES: 7 FINDINGS: BILE DUCTS: Diffusely dilated up to 1.5 cm. A rounded filling defect in the distal common bile duct. PANCREATIC DUCT: Not imaged.     IMPRESSION: Biliary dilation with filling defect in the distal common bile duct, which was reportedly removed. Refer to ERCP report for full details.      Billing  Total time 50 minutes, to include face to face visit, review of EMR, ordering, documentation and coordination of care on date of service      Signed by: Malorie Castrejon NP

## 2023-01-05 NOTE — TELEPHONE ENCOUNTER
NADEEN Poe : will forward to [facility] [place]     Jessie Naidu CPhT  [facility]  Oncology Pharmacy Liaison II  [email or web address]  [phone] (phone  [phone] (fax

## 2023-01-05 NOTE — PROGRESS NOTES
Port lab draw prior to NP visit. Port left accessed for tomorrows IVF appointment at [facility].

## 2023-01-05 NOTE — LETTER
1/5/2023         RE: Ifrah Huitron  [address] [place] [address]        Dear Colleague,    Thank you for referring your patient, Ifrah Huitron, to the [facility] [place]. Please see a copy of my visit note below.    [facility] Outpatient Progress Note    Patient: Ifrah Huitron  MRN: [id number]  Date of Service: Jan 5, 2023          Reason for Visit    1. Spindle cell sarcoma  2. Post-hospital follow-up (sepsis, acute/chronic anemia, obstructive cholelithiasis post lap-carmelita)    Primary Oncologist: Dr. 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  Has not tolerated prior chemotherapy well. Most recently received Trabectedin x 1 cycle six weeks ago; complicated by HUSSEIN, hepatic toxicity, nausea, anorexia, dehydration, pancytopenia. Further chemo was held in early December to allow more recovery time.     He has since been rehospitalized last week for sepsis and acute anemia. Sepsis resolved with IV antibiotics. No bleeding source identified. He had interval lap carmelita for obstructive cholelithiasis almost two weeks ago.    He's been home for almost a week. Doing generally well. Lap incision sites well-healed.   PS 1-2.     Recent CT: slight progression in splenic mass, several new lung nodules and stable mediastinal mass.    Plan recommended by Dr. Wolff and Maynor CHOW is to start next line plan is to start him on Pazopanib 800 mg daily. He's received insurance approval and is expecting delivery of drug tomorrow.  He's received education on the toxicities of the drug and desires proceeding.    Baseline labs: improving LFTs (Alk phos remains elevated, but improving), anemia back to baseline and rest of CBC normal.   Baseline EKG is NSR, no QTc prolongation.      Plan:  -Initiate Pazopanib 800 mg daily on Sunday 1/7. This will allow 2 weeks from  his lap carmelita for wound healing.  -He has stopped PPI in preporation to starting due to drug:drug interactions  -Weekly CBC, CMP x 4, then every 2 weeks x 2, then monthly. TSH monthly. He can do this in [place] when in for IVF  -Weekly IVF in [place]  -EKG in 2 weeks, then monthly  -Follow-up virtually with Maynor Rios in 2 weeks for toxicity eval  -He can see me in [place] or [facility] for face:face assessments when this is needed    #  Chronic/recurrent mucositis  Ongoing throughout all of treatment. Improving off chemo, but risk to exacerbate on new therapy. Continue Doxepin, MMW, salt/soda, Nystatin.     # Poor Oral Intake  # Dehydration  # HUSSEIN, improved  # Hypokalemia  # Hypophosphatemia  Was requiring IVF 1-2 days/week in [place]. Creatinine is up a bit this week (1.4) since his dismissal creatinine. He's still struggling some with his oral intake, so this is likely pre-renal. Recent renal US normal. Continues phos 250mg BID and potassium 20meq daily, lytes in normal range.     Plan:  -IVF weekly (can go up to twice weekly if needed) in [place]. Scheduled tomorrow  -Monitor lytes weekly x 4, then every 2 weeks x 2 upon starting Pazopanib.    # Acute LFT elevation with hyperbilirubinemia, resolving  # Obstructive cholelithiasis, s/p lap carmelita  He is 12 days post lap-carmelita. Alk phos declining and transaminases + bili WNL.    Plan:  -Risk of hepotoxicity on Pazopanib. Monitor LFTs closely (weekly the first month)    # Chemo-related pancytopenia, resolved  # Acute/chronic anemia  2/2 chemotherapy. Recent hospitalization, noted to have acute anemia 6.4 (baseline 7-8). No bleeding source identified on ERCP, EGD and CT abd. Surgery felt unlikely related to post-op loss following lap carmelita. Stabilized after pRBC. Holding Celebrex/NSAIDs. Could have been related to acute infectious process/reactive with sepsis. Today, this is stabilized back to his baseline.     Plan:  -Monitor. Transfusion parameters:  <7.0-7.5 as needed    # Superficial Phlebitis, resolved  RUE on US 11/30/22. Continue warm compress, improving. Avoid anticoagulation with thrombocytopenia and no DVT. Symptoms are resolved.     # Cancer related pain  Increased pain left flank area related to progression of the splenic mass. Well-managed on topical lidocaine and oxycodone  Mg once - twice/day.  Was on Celebrex, but on hold with HUSSEIN and recent anemia concerning for possible bleed.     # Hypopituitarism  # Hypothyroidism  Continues Hydrocortisone and Synthroid. Stress doses hydrocortisone with acute illness.  Follows with Endo.   Baseline TSH before starting pazopanib pending. Pazopanib can exacerbate thyroid dysfunction, will be followed.    #   Nausea, dyspepsia  Continues TUMS prn and Zofran PRN. Holding PPI in anticipation of starting pazopanib.    #   Hoarseness  Likely secondary to mediastinal mass. Status post vocal cord injection in July with ENT    #  Depression/anxiety  On Lexpro     #  Deconditioning  PT referral at [place]      ______________________________________________________________________________    History of Present Illness/ Interval History    Mr. Ifrah Huitron  is a 72 year old on palliative chemo (multiple prior lines) for sarcoma, managed by Dr. Wolff and Maynor CHOW. He had a number of toxicities after one dose of Trabectedin 6 weeks ago and subsequent chemo has been on hold. He's here to reassess post-hospital, ahead of starting next line pazopanib.     He's had a few hospitalizations this last month. He was admitted two weeks ago for cholangitis, sepsis and acute anemia. He underwent lap carmelita (12/24) and antibiotics with improvement. He required blood transfusion and work-up was negative for bleeding source. He's been home x 1 week and feels he's steadily improving. Still struggling with dysgeusia and no appetite, so having a hard time finding palatable foods. Fair oral hydration. No nausea. Bowels regular. No  fevers. Mild mucositis, is improved off chemo.     He has been having more left flank pain. CT notes some progression of large splenic tumor, no other etiology. He is getting good relief with topical lidocaine to area and oxycodone 5 mg BID.    He feels generally deconditioned and weak over the last few months.     ECOG Performance    1-2      Oncology History/Treatment  Diagnosis/Stage:   3/2021: Metastatic spindle cell sarcoma involving mediastinum, sub-diaphragm, lung  -presented to PCP with left shoulder/chest/back pain  -imaging: subdiaphragmatic mass, liver lesions, mediastinal mass with multiple lung mets  -mediastinal mass biopsy: spindle cell sarcoma. 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 Complicated by nausea and recurrent thrush  -C4: same dose reduction. Complicated by significant neurotoxicity, so only received 5.5 days. Symptoms resolved after stopping ifosfamide.   -CT showed response after 4 cycles    -3/10/2022 - 7/12/2022: single agent Doxil, until progression.   -C5 10% dose-reduced  -C6 additional 10% (20% total) dose-reduced  -8/8/2022: CT showed progression    8/10/2022 - 10/6/2022: gemcitabine (days 1, 8 every 21 d) x 3 cycles, until progression  -complicated by edema and cytopenias    11/15/2022: Trabectedin x 1 cycle, discontinued for toxicity  -complicated by admission for HUSSEIN, acute hepatic toxicity and cytopenias requiring transfusion support    1/7/2022: Pazopanib 800 mg daily (planning to initiate)      Physical Exam    GENERAL: Alert and oriented to time place and person. Seated comfortably. In no  distress. Chronically ill-appearing. Alone.  HEAD: Atraumatic and normocephalic. Partial alopecia.  EYES: LANCE, EOMI. No erythema. No icterus.  ORAL CAVITY: Moist. Tongue normal, no candidiasis.   CHEST: clear to auscultation bilaterally. Resonant to percussion throughout bilaterally. Symmetrical breath movements bilaterally.  CVS:  Regular rate and rhythm.   ABDOMEN: Soft. Not tender. Not distended. No palpable hepatomegaly or splenomegaly. No other mass palpable. Bowel sounds present. RUQ and periumbilical lap incisions approximated with resolving hematomas.   EXTREMITIES: No edema.  SKIN: No rash. No jaundice.  NEURO: No gross deficit noted. Non-antalgic gait.      Lab Results    Recent Results (from the past 168 hour(s))   Magnesium   Result Value Ref Range    Magnesium 2.1 1.7 - 2.3 mg/dL   Potassium   Result Value Ref Range    Potassium 3.6 3.4 - 5.3 mmol/L   CBC with platelets   Result Value Ref Range    WBC Count 10.4 4.0 - 11.0 10e3/uL    RBC Count 2.45 (L) 4.40 - 5.90 10e6/uL    Hemoglobin 7.6 (L) 13.3 - 17.7 g/dL    Hematocrit 23.6 (L) 40.0 - 53.0 %    MCV 96 78 - 100 fL    MCH 31.0 26.5 - 33.0 pg    MCHC 32.2 31.5 - 36.5 g/dL    RDW 17.5 (H) 10.0 - 15.0 %    Platelet Count 111 (L) 150 - 450 10e3/uL   CBC with platelets   Result Value Ref Range    WBC Count 10.7 4.0 - 11.0 10e3/uL    RBC Count 2.75 (L) 4.40 - 5.90 10e6/uL    Hemoglobin 8.5 (L) 13.3 - 17.7 g/dL    Hematocrit 26.5 (L) 40.0 - 53.0 %    MCV 96 78 - 100 fL    MCH 30.9 26.5 - 33.0 pg    MCHC 32.1 31.5 - 36.5 g/dL    RDW 17.5 (H) 10.0 - 15.0 %    Platelet Count 157 150 - 450 10e3/uL   Magnesium   Result Value Ref Range    Magnesium 1.8 1.7 - 2.3 mg/dL   Comprehensive metabolic panel   Result Value Ref Range    Sodium 138 136 - 145 mmol/L    Potassium 3.4 3.4 - 5.3 mmol/L    Chloride 101 98 - 107 mmol/L    Carbon Dioxide (CO2) 27 22 - 29 mmol/L    Anion Gap 10 7 - 15 mmol/L    Urea Nitrogen 25.2 (H) 8.0 - 23.0 mg/dL    Creatinine 1.41 (H) 0.67 -  1.17 mg/dL    Calcium 9.1 8.8 - 10.2 mg/dL    Glucose 105 (H) 70 - 99 mg/dL    Alkaline Phosphatase 242 (H) 40 - 129 U/L    AST 26 10 - 50 U/L    ALT 30 10 - 50 U/L    Protein Total 5.7 (L) 6.4 - 8.3 g/dL    Albumin 3.3 (L) 3.5 - 5.2 g/dL    Bilirubin Total 0.4 <=1.2 mg/dL    GFR Estimate 52 (L) >60 mL/min/1.73m2   Phosphorus   Result Value Ref Range    Phosphorus 3.0 2.5 - 4.5 mg/dL   TSH with free T4 reflex   Result Value Ref Range    TSH 0.56 0.30 - 4.20 uIU/mL   EKG 12-lead complete with read (future)   Result Value Ref Range    Systolic Blood Pressure  mmHg    Diastolic Blood Pressure  mmHg    Ventricular Rate 83 BPM    Atrial Rate 83 BPM    KY Interval 118 ms    QRS Duration 72 ms     ms    QTc 415 ms    P Axis 61 degrees    R AXIS -25 degrees    T Axis 20 degrees    Interpretation ECG       Sinus rhythm  Normal ECG  When compared with ECG of 17-SEP-2021 14:33,  Premature atrial complexes are no longer Present         Imaging    CT Chest/Abdomen/Pelvis w Contrast    Result Date: 12/22/2022  CT CHEST/ABDOMEN/PELVIS WITH CONTRAST 12/22/2022 2:13 PM CLINICAL HISTORY: Near syncopal spell. Fever, weakness, elevated liver enzymes. 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 Last imaging 11/7/2022. Evaluate for interval change. TECHNIQUE: CT scan of the chest, abdomen, and pelvis was performed following injection of IV contrast. Multiplanar reformats were obtained. Dose reduction techniques were used. CONTRAST: 68 mL Isovue 370 COMPARISON: CT chest, abdomen and pelvis 11/7/2022. FINDINGS: LUNGS AND PLEURA: No effusions. No pneumothorax. New indeterminant 0.5 cm pulmonary nodule medial right lower lobe series 3 image 184. New subpleural right upper lobe lateral nodule measuring 0.2 cm, image 85. There are multiple additional examples of new small nodules bilaterally, some with a groundglass character, for example, image 126 and others appearing  small and solid clustered together, image 165. MEDIASTINUM/AXILLAE: Stable mass at the anterior mediastinum and medial left upper lobe region that is 3.7 x 2.7 cm series 2 image 62. Right chest Port-A-Cath. Stable small mediastinal lymph nodes. No acute mediastinal abnormality. Stable small nodule at the anterior left pericardial fat measuring 0.7 cm image 85. CORONARY ARTERY CALCIFICATION: Mild. HEPATOBILIARY: Several small hypodensities in the liver are stable. Some are suggestive of cysts, but others are too small for characterization. No new liver lesion identified. Gallbladder shows no acute abnormality. PANCREAS: Normal. SPLEEN: Lobulated splenic mass is slightly larger measuring 9.3 x 8.4 cm, previously 8.9 x 7.9 cm image 129. This contacts the posterior left hemidiaphragm. A portion of this also contacts the upper left kidney. ADRENAL GLANDS: Normal. KIDNEYS/BLADDER: Stable bilateral renal cysts without specific imaging follow-up recommended. No hydronephrosis or stone. Bladder is unremarkable. BOWEL: Colonic diverticula. No acute bowel abnormality. Normal appendix. PELVIC ORGANS: Prostate is 4.9 cm. ADDITIONAL FINDINGS: No new adenopathy identified. MUSCULOSKELETAL: Stable small sclerotic foci within the bilateral pelvis, right femoral neck, left femoral head. No new bone lesion identified.     IMPRESSION: 1.  Several bilateral new pulmonary nodules identified. These are indeterminant. New pulmonary metastatic disease is a possibility, though this could be infectious or inflammatory nodules. As such, recommend short interval follow-up CT chest in three months. 2.  Slightly larger mass involving the spleen and contacting the adjacent left posterior hemidiaphragm and left upper kidney. 3.  Stable size of an anterior mediastinal mass. 4.  Small hypodensities in the liver are stable. Many of these appear to represent cysts, but others are too small for characterization. AMBER JASON MD   SYSTEM ID:   [id number]    US Renal Complete Non-Vascular    Result Date: 12/27/2022  EXAM: US RENAL COMPLETE NON-VASCULAR LOCATION: [facility] DATE/TIME: 12/27/2022 5:50 PM INDICATION: left flank pain, eval for hydro COMPARISON: CT 12/26/2022 TECHNIQUE: Routine Bilateral Renal and Bladder Ultrasound. FINDINGS: RIGHT KIDNEY: 10.8 x 5.2 x 5.5 cm. Simple upper pole cyst measuring 1.0 cm. No follow-up is indicated. No hydronephrosis or shadowing stone. LEFT KIDNEY: 10.9 x 4.9 x 4.8 cm. Simple mid to upper pole cortical cysts with the larger measuring 1.3 cm. No hydronephrosis or shadowing stone. BLADDER: Normal.     IMPRESSION: 1.  No evidence of hydronephrosis. 2.  Small simple renal cortical cysts. No follow-up is indicated.    Abdomen MRI w & w/o contrast mrcp    Result Date: 12/22/2022  EXAM: MR ABDOMEN MRCP W/O and W CONTRAST LOCATION: [facility] DATE/TIME: 12/22/2022 5:49 PM INDICATION: Elevated liver enzymes. Abnormal ultrasound on 11/30/2022. Fever, history of metastatic spindle cell carcinoma. Evaluate for acute hepatobiliary process. COMPARISON: CT of chest, abdomen and pelvis 12/22/2022. Ultrasound abdomen 11/30/2022. TECHNIQUE: Routine MR liver/pancreas protocol including axial and coronal MRCP sequences. 2D and 3D reconstruction performed by MR technologist including MIP reconstruction and slab cholangiograms. If performed with contrast, additional dynamic T1 post IV contrast images. CONTRAST: 7 mL Gadavist. FINDINGS: MRCP: Gallbladder sludge identified. No convincing acute inflammatory change of the gallbladder wall on the supplied MRI images. There is mild biliary ductal dilatation diffusely. Common bile duct is 9 mm. There is small ill-defined low signal within the  distal common bile duct for example series 3, image 19 and on series 4, image 20. Also see series 6, image 10. LIVER: There are multiple right and left hepatic simple cysts that are very small in size  without worrisome features. No worrisome hepatic lesion identified. PANCREAS: No solid pancreas lesion. The T2-weighted images show a few tiny cystic pancreas lesions without worrisome features. One of these at the uncinate measures 0.3 cm series 3, image 23. Another at the pancreas tail measures 0.3 cm series 3, image 25. No main pancreatic duct dilatation. ADDITIONAL FINDINGS: Large lobulated mass occupying the spleen again noted and described on the comparison CT. It measures approximately 9 x 8.1 cm and contacts the posterior left hemidiaphragm, and contacts the upper left kidney. Multiple bilateral renal cysts appear simple without specific imaging follow-up recommended. No hydronephrosis. Normal adrenals.     IMPRESSION: 1.  Mild biliary ductal dilatation. Small region of low signal at the distal common bile duct could represent gallbladder sludge extending to this region. There is gallbladder sludge noted within the gallbladder lumen as well. The common bile duct measures 9 mm. 2.  A few tiny cystic lesions at the pancreas suggesting small cystic neoplasms. One year follow-up MRI is recommended for surveillance. No pancreas duct dilatation. 3.  Lobulated mass at the spleen consistent with malignancy again identified. 4.  Several small simple hepatic cysts without worrisome features.     CT Abdomen Pelvis w Contrast    Result Date: 12/26/2022  CT ABDOMEN AND PELVIS WITH CONTRAST 12/26/2022 3:18 PM CLINICAL HISTORY: Left upper quadrant pain. Recent laparoscopic cholecystectomy. 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 TECHNIQUE: CT scan of the abdomen and pelvis was performed following injection of IV contrast. Multiplanar reformats were obtained. Dose reduction techniques were used. CONTRAST: 61mL of Isovue 370 COMPARISON: CT of the chest, abdomen, and pelvis performed 12/22/2022. FINDINGS: LOWER CHEST: The visualized lung bases are clear. Small hiatal hernia. HEPATOBILIARY:  Several small indeterminate hypodensities in the liver are too small to characterize, but are unchanged. Interval postoperative changes of cholecystectomy. Pneumobilia is new since the previous exam, and may be related to a recent ERCP. PANCREAS: Normal. SPLEEN: Hypoenhancing mass within the spleen superiorly and posteriorly has not significantly changed, measured at 9.1 x 8.7 cm. This mass again abuts the left hemidiaphragm posteriorly as well as the upper pole of the left kidney. ADRENAL GLANDS: Normal. KIDNEYS/BLADDER: Several bilateral renal cysts are unchanged, and would require no specific follow-up. The kidneys are otherwise unremarkable. No hydronephrosis. BOWEL: No bowel obstruction. Scattered sigmoid diverticulosis. No convincing evidence for colitis or diverticulitis. Unremarkable appendix. PELVIC ORGANS: Mild prostatic enlargement. Small amount of nonspecific free fluid in the pelvis, new since the previous exam. LYMPH NODES: No enlarged lymph nodes are identified in the abdomen or pelvis. VASCULATURE: Mild luminal narrowing in the proximal SMA is not significantly changed (series 4 image 42). ADDITIONAL FINDINGS: Free intraperitoneal air anteriorly is likely related to the recent laparoscopic cholecystectomy. MUSCULOSKELETAL: No destructive bony lesions are identified.     IMPRESSION: 1.  Hypoenhancing splenic mass has not significantly changed, measuring 9.1 cm, and is again noted to abut the left hemidiaphragm and upper left kidney. 2.  Interval postoperative changes of laparoscopic cholecystectomy are noted. Free intraperitoneal air is also likely postoperative. 3.  Pneumobilia is new since the previous exam, and likely related to a recent ERCP. 4.  Small amount of nonspecific free fluid in the pelvis is new since the previous exam. 5.  Mild luminal narrowing in the proximal SMA is unchanged, and is of uncertain clinical significance. LELIA RODRIGUEZ MD   SYSTEM ID:  [id number]     Surgery [facility]  "Fluoro G/T 5 Min w Stills    Result Date: 12/23/2022  EXAM: XR SURGERY LO FLUORO GREATER THAN 5 MIN W STILLS LOCATION: [facility] DATE/TIME: 12/23/2022 2:54 PM INDICATION: intra op COMPARISON: MRCP 12/22/2022 TECHNIQUE: Exam performed by gastroenterologist. FLUOROSCOPIC TIME: 2.8 minutes NUMBER OF IMAGES: 7 FINDINGS: BILE DUCTS: Diffusely dilated up to 1.5 cm. A rounded filling defect in the distal common bile duct. PANCREATIC DUCT: Not imaged.     IMPRESSION: Biliary dilation with filling defect in the distal common bile duct, which was reportedly removed. Refer to ERCP report for full details.      Billing  Total time 50 minutes, to include face to face visit, review of EMR, ordering, documentation and coordination of care on date of service      Signed by: Malorie Castrejon NP      Oncology Rooming Note    January 5, 2023 11:36 AM   Ifrah Huitron is a 72 year old male who presents for:    Chief Complaint   Patient presents with     Oncology Clinic Visit     Sarcoma (H)     Initial Vitals: BP (!) 150/76   Pulse 95   Temp 98.7  F (37.1  C)   Resp 18   Wt 61.5 kg (135 lb 8 oz)   SpO2 96%   BMI 21.22 kg/m   Estimated body mass index is 21.22 kg/m  as calculated from the following:    Height as of 12/27/22: 1.702 m (5' 7\").    Weight as of this encounter: 61.5 kg (135 lb 8 oz). Body surface area is 1.71 meters squared.  No Pain (1) Comment: Data Unavailable   No LMP for male patient.  Allergies reviewed: Yes  Medications reviewed: Yes    Medications: Medication refills not needed today.  Pharmacy name entered into [employer]:    [facility] - [place], [place] - [address] [facility] [place], [place] - [facility] - [place], [place] - [address]    Clinical concerns: None       Matilde Renee LPN                Again, thank you for allowing me to participate in the care of your patient.  "       Sincerely,        Malorie Castrejon, NP

## 2023-01-06 NOTE — PROGRESS NOTES
Infusion Nursing Note:  Ifrah Huitron presents today for IVF.    Patient seen by provider today: No   present during visit today: Not Applicable.    Note: N/A.    Intravenous Access:  Implanted Port.    Treatment Conditions:  Not Applicable.    Post Infusion Assessment:  Patient tolerated infusion without incident.  Blood return noted pre and post infusion.  Site patent and intact, free from redness, edema or discomfort.  No evidence of extravasations.  Access discontinued per protocol.     Discharge Plan:   Copy of AVS reviewed with patient and/or family.  Patient will return 1/12/23 for next appointment.  Patient discharged in stable condition accompanied by: self.  Departure Mode: Ambulatory.      Suzette Riley RN

## 2023-01-06 NOTE — PROGRESS NOTES
Clinic Care Coordination Contact  Care Team Conversations    RN CC called and spoke to the patient. He reports he is doing well since his discharge from the hospital. Patient reports the oncology team and everyone at [facility] has been wonderful. Patient reports he has everything he needs besides a refill on his nystatin for his mouth. RN CC will send message to oncologist. At this time, patient feels he has everything he needs. He said he works very closely with the oncology team and reports they take care of all of his needs. RN CC will send an introduction letter in case patient feels he needs care coordination in the future. RN CC will do no further outreaches at this time.     Anali Camarillo RN Care Coordination   [facility]  [place], [place], [place]  Email: [email or web address]  Phone: [phone]

## 2023-01-06 NOTE — TELEPHONE ENCOUNTER
The following information was communicated by [place] to phone .    When you receive your medication from [facility], do not start taking it until you have spoken with one of our oral chemotherpy pharmacists.  They can be reached by calling [phone].  He understands to wait until Monday to start this medication.    Jessie Naidu CPhT  [facility]  Oncology Pharmacy Liaison II  [email or web address]  [phone] (phone  [phone] (fax

## 2023-01-09 NOTE — PROGRESS NOTES
"Video visit    Start time 11:02, stop time 11:24; additional 10 minutes spent on the date of the encounter doing chart review, documentation and further activities as noted.     Provider location: [facility], [address] [place] [address] [address] [address]    Patient location: patient home    Mode of transmission: video     Subjective:    Established patient; the following is a comprehensive summary of his Endocrine care to date.      Ifrah Huitron is a 71 year old male who presents for hypopituitarism.      CT head obtained 2010 due to headaches with finding of a sellar lesion and subsequent MRI 8/22/2010 ([facility]) per OSH radiology read: \"22.9 mm enhancing lesion within the sella, the lesion abuts the carotid siphons, but does not grossly encase them.  Mass effect on the right carotid siphon is slightly more pronounced than the mass effect on the left, the lesion abuts the right aspect of the optic chiasm without janneth distortion of the chiasm.\" Pre-op Ifrah does not recall vision loss from the pituitary tumor.      S/p transsphenoidal pituitary surgery 8/23/2010 done by Dr. Khanna and Dr. Fontana at [facility]. I read the operative report and the surgeons suspected that there was normal residual pituitary left behind. Per the OSH pathology report the specimen was consistent with a pituitary adenoma and the IHC stains were negative for: FSH, LH, ACTH, TSH, prolactin, GH.      OSH MRI brain ([facility]) 6/30/2016: radiology read \"Postoperative changes related to transsphenoidal resection of a previously noted bulky intrasellar tumor there is a concave morphology to the mixed signal tissue along the floor of the sella. Presumed normal pituitary tissue is noted within the left aspect of the sella. The infundibulum is deviated to the left. Nothing to suggest residual or recurrent tumor. Normal cavernous sinuses. Normal optic chiasm.\"      Most recent imaging MRI brain 9/29/2021 ([facility] "[facility]):  -per radiology: \"sella region appears stable in the interval\" compared to prior MRI study 5/23/2019, I reached out directly to radiology to get more details re the sella on his study but never heard back  -my review: cuts through the sella are very thick, there appears to be residual pituitary tissue and non-specific post-surgical changes in the sella, no overt structural recurrence/residual disease.      Currently: no peripheral vision abnormalities. He has chronic headaches that are unchanged.      FSH/LH:  -most recent total testosterone 311 ng/dL (ref range 241-826 ng/dL) 9/2020 and this was collected at 11:20 AM, total testosterone was always normal on many draws over the years except it was low when checked a few weeks post-op in 2010  -He has never been on testosterone therapy     ACTH:  -Hydrocortisone started prior to pituitary surgery due to suspected secondary adrenal insufficiency, it is unclear to me what his baseline cortisol was prior to starting HC although there was a serum cortisol value of 1 mcg/dL (drawn at 11:53 AM) with concomitant ACTH 8 pg/mL pre-op in 2010  -He had an ACTH of 30 pg/mL in 2018   -1/9/2023: he continues on HC 20/10       TSH:  -Thyroid hormone was started prior to pituitary surgery due to suspected central hypothyroidism   -2/4/2022: free T4 1.45 (ref range 0.76 - 1.46 ng/dL), TSH 0.21   -Current weight ~140 #, 5'7\"    4/26/2022: free T4 1.43 (ULN 1.46 ng/dL)    6/2022: Because free T4 is high normal he will reduce his dose of thyroid hormone.  Instead of taking levothyroxine 1 tablet/day (each tab 75 mcg), 7 days/week for total of 7 tablets weekly he will take 6.5 tablets/week.      11/15/2022: free T4 mid normal     1/9/2023: he continues on LT4 75 mcg tabs 6.5 tabs/week      Prolactin:  -most recently 9/2020: prolactin normal and always normal over the years including pre-op  -no galactorrhea      GH:  -most recently 9/2020: IGF-1 43 (ref range 53 " - 222 ng/mL) with Z-score -2.3 (ref range -2.0 - 2.0 S.D.)  -no increase in shoe/ring size      DI:   -He developed transient post-operative DI  -Serum sodium has remained normal, most recently 1/2023  -No polyuria      History is notable for a single episode of nephrolithiasis (remote, around the year 2000). No prior hypercalcemia.      No HbA1c. No history of DM. MRI abdomen 12/2022: a few tiny cystic lesions at the pancreas suggesting small cystic neoplasms. One year follow-up MRI is recommended for surveillance. 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 Treatment included Keytruda 6/2021 - 10/2021 and combination chemotherapy was started 10/2021. Per the oncology note 1/5/2023 he has progressed on multiple lines of therapy and therapy was on hold due to several recent hospital admissions. Likely starting pazopanib soon and the oncology team per protocol has ordered TSH monthly. He follows with Dr. Wolff. He has a hoarse voice due to the mediastinal mass impairing vocal fold motion and follows with ENT.       Father had nephrolithiasis. No family members with pituitary disease. No pancreatic pathology in the family. No FH of MEN.      Objective:    Virtual visit.     Assessment/Plan:    # Non-functional pituitary macroadenoma, s/p transsphenoidal resection in 2010     Ifrah endorses possible peripheral vision changes. He is scheduling an upcoming eye exam and I reached out to his ophthalmologist to also do formal visual field testing. If abnormal, depending on the pattern, will need to consider repeat MRI.      # Secondary adrenal insufficiency     Ifrah will continue hydrocortisone 20 mg as soon as he wakes up in the morning and 10 mg about 6 hours later around  midday      We again reviewed sick day rules and he understands these well.      He should double his dose of hydrocortisone when sick.     He does have an emergency hydrocortisone injection kit at home and we reviewed that he will give 100 mg when he is ill and cannot take hydrocortisone by mouth and then call 911.  We also reviewed that he will require intravenous hydrocortisone with taper for any surgery or procedure.     We also reviewed that he should have a medical alert bracelet that states he has hypopituitarism, adrenal insufficiency, and hypothyroidism, and he will obtain this.     # Central hypothyroidism    He will continue LT4 75 mcg tabs, 6.5 days per week (I.e. 1 tab daily and 1 day per week take a 1/2 tab). Free T4 level is at goal.     He will be starting pazopanib and as part of this oncology routinely monitors thyroid function testing. I reached out to his oncology team so they can change the order from TSH to free T4 as TSH will not be an accurate indicator of Ifrah's thyroid function.

## 2023-01-09 NOTE — LETTER
"    1/9/2023         RE: Ifrah Huitron  [id number]  [facility] [address]        Dear Colleague,    Thank you for referring your patient, Ifrah Huitron, to the [facility]. Please see a copy of my visit note below.    Video visit    Start time 11:02, stop time 11:24; additional 10 minutes spent on the date of the encounter doing chart review, documentation and further activities as noted.     Provider location: [facility], [address] [place] [address] [address], [place] [address]    Patient location: patient home    Mode of transmission: video     Subjective:    Established patient; the following is a comprehensive summary of his Endocrine care to date.      Ifrah Huitron is a 71 year old male who presents for hypopituitarism.      CT head obtained 2010 due to headaches with finding of a sellar lesion and subsequent MRI 8/22/2010 ([facility]) per OSH radiology read: \"22.9 mm enhancing lesion within the sella, the lesion abuts the carotid siphons, but does not grossly encase them.  Mass effect on the right carotid siphon is slightly more pronounced than the mass effect on the left, the lesion abuts the right aspect of the optic chiasm without janneth distortion of the chiasm.\" Pre-op Ifrah does not recall vision loss from the pituitary tumor.      S/p transsphenoidal pituitary surgery 8/23/2010 done by Dr. Khanna and Dr. Fontana at [facility] in [place]. I read the operative report and the surgeons suspected that there was normal residual pituitary left behind. Per the OSH pathology report the specimen was consistent with a pituitary adenoma and the IHC stains were negative for: FSH, LH, ACTH, TSH, prolactin, GH.      OSH MRI brain ([facility]) 6/30/2016: radiology read \"Postoperative changes related to transsphenoidal resection of a previously noted bulky intrasellar tumor there is a concave morphology to the mixed signal tissue along the floor of the sella. Presumed normal " "pituitary tissue is noted within the left aspect of the sella. The infundibulum is deviated to the left. Nothing to suggest residual or recurrent tumor. Normal cavernous sinuses. Normal optic chiasm.\"      Most recent imaging MRI brain 9/29/2021 ([facility]):  -per radiology: \"sella region appears stable in the interval\" compared to prior MRI study 5/23/2019, I reached out directly to radiology to get more details re the sella on his study but never heard back  -my review: cuts through the sella are very thick, there appears to be residual pituitary tissue and non-specific post-surgical changes in the sella, no overt structural recurrence/residual disease.      Currently: no peripheral vision abnormalities. He has chronic headaches that are unchanged.      FSH/LH:  -most recent total testosterone 311 ng/dL (ref range 241-826 ng/dL) 9/2020 and this was collected at 11:20 AM, total testosterone was always normal on many draws over the years except it was low when checked a few weeks post-op in 2010  -He has never been on testosterone therapy     ACTH:  -Hydrocortisone started prior to pituitary surgery due to suspected secondary adrenal insufficiency, it is unclear to me what his baseline cortisol was prior to starting HC although there was a serum cortisol value of 1 mcg/dL (drawn at 11:53 AM) with concomitant ACTH 8 pg/mL pre-op in 2010  -He had an ACTH of 30 pg/mL in 2018   -1/9/2023: he continues on HC 20/10       TSH:  -Thyroid hormone was started prior to pituitary surgery due to suspected central hypothyroidism   -2/4/2022: free T4 1.45 (ref range 0.76 - 1.46 ng/dL), TSH 0.21   -Current weight ~140 #, 5'7\"    4/26/2022: free T4 1.43 (ULN 1.46 ng/dL)    6/2022: Because free T4 is high normal he will reduce his dose of thyroid hormone.  Instead of taking levothyroxine 1 tablet/day (each tab 75 mcg), 7 days/week for total of 7 tablets weekly he will take 6.5 tablets/week.      11/15/2022: free T4 " mid normal     1/9/2023: he continues on LT4 75 mcg tabs 6.5 tabs/week      Prolactin:  -most recently 9/2020: prolactin normal and always normal over the years including pre-op  -no galactorrhea      GH:  -most recently 9/2020: IGF-1 43 (ref range 53 - 222 ng/mL) with Z-score -2.3 (ref range -2.0 - 2.0 S.D.)  -no increase in shoe/ring size      DI:   -He developed transient post-operative DI  -Serum sodium has remained normal, most recently 1/2023  -No polyuria      History is notable for a single episode of nephrolithiasis (remote, around the year 2000). No prior hypercalcemia.      No HbA1c. No history of DM. MRI abdomen 12/2022: a few tiny cystic lesions at the pancreas suggesting small cystic neoplasms. One year follow-up MRI is recommended for surveillance. 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 Treatment included Keytruda 6/2021 - 10/2021 and combination chemotherapy was started 10/2021. Per the oncology note 1/5/2023 he has progressed on multiple lines of therapy and therapy was on hold due to several recent hospital admissions. Likely starting pazopanib soon and the oncology team per protocol has ordered TSH monthly. He follows with Dr. Woflf. He has a hoarse voice due to the mediastinal mass impairing vocal fold motion and follows with ENT.       Father had nephrolithiasis. No family members with pituitary disease. No pancreatic pathology in the family. No FH of MEN.      Objective:    Virtual visit.     Assessment/Plan:    # Non-functional pituitary macroadenoma, s/p transsphenoidal resection in 2010     [place] endorses possible peripheral vision changes. He is scheduling an upcoming eye exam and I reached out to his ophthalmologist to also do formal  visual field testing. If abnormal, depending on the pattern, will need to consider repeat MRI.      # Secondary adrenal insufficiency     Ifrah will continue hydrocortisone 20 mg as soon as he wakes up in the morning and 10 mg about 6 hours later around midday      We again reviewed sick day rules and he understands these well.      He should double his dose of hydrocortisone when sick.     He does have an emergency hydrocortisone injection kit at home and we reviewed that he will give 100 mg when he is ill and cannot take hydrocortisone by mouth and then call 911.  We also reviewed that he will require intravenous hydrocortisone with taper for any surgery or procedure.     We also reviewed that he should have a medical alert bracelet that states he has hypopituitarism, adrenal insufficiency, and hypothyroidism, and he will obtain this.     # Central hypothyroidism    He will continue LT4 75 mcg tabs, 6.5 days per week (I.e. 1 tab daily and 1 day per week take a 1/2 tab). Free T4 level is at goal.     He will be starting pazopanib and as part of this oncology routinely monitors thyroid function testing. I reached out to his oncology team so they can change the order from TSH to free T4 as TSH will not be an accurate indicator of Ifrah's thyroid function.          Again, thank you for allowing me to participate in the care of your patient.        Sincerely,        Elpidio Tao MD

## 2023-01-09 NOTE — TELEPHONE ENCOUNTER
Pending Prescriptions:                       Disp   Refills    hydrocortisone (CORTEF) 10 MG tablet      270 ta*3            Sig: Take 20 mg in the morning and 10 mg in the           afternoon    levothyroxine (SYNTHROID/LEVOTHROID) 75 M*90 tab*3            Sig: Take 1 tablet (75 mcg) by mouth every morning    Patient had a virtual appt with Dr. Tao this morning and forgot to mention that he needs a refill for the above medications.

## 2023-01-10 PROBLEM — R50.9 FEVER, UNSPECIFIED FEVER CAUSE: Status: ACTIVE | Noted: 2023-01-01

## 2023-01-10 PROBLEM — E27.40 ADRENAL INSUFFICIENCY (H): Status: ACTIVE | Noted: 2023-01-01

## 2023-01-10 NOTE — ED TRIAGE NOTES
Triage Assessment     Row Name 01/10/23 1557       Triage Assessment (Adult)    Airway WDL WDL       Respiratory WDL    Respiratory WDL WDL       Skin Circulation/Temperature WDL    Skin Circulation/Temperature WDL X  pale       Cardiac WDL    Cardiac WDL WDL       Peripheral/Neurovascular WDL    Peripheral Neurovascular WDL WDL       Cognitive/Neuro/Behavioral WDL    Cognitive/Neuro/Behavioral WDL WDL

## 2023-01-10 NOTE — TELEPHONE ENCOUNTER
1/10/23    Called patient. The past few nights he has been waking up in the middle of the night with chills and can't get warm. He also has frequent urination. L sided pain is quite bothersome. Once he gets all of his meds in he feels better. He is resting often. His temperature has gone up to 101. He is not on any antibiotics right now. He is taking Tylenol.    He needs an infectious work-up including urine culture, blood cultures, CBC. Consider imaging for post surgical infection.     Will call [facility] to give them a heads up.    Maynor Rios PA-C

## 2023-01-10 NOTE — PROGRESS NOTES
Called [facility] to assess. Reports that for past few days (3 perhaps) Pt has been febrile with chills/shakes in the evening into the morning. Once he takes his medications the temp reduces & chills alleviate about 60 minutes later. Medications do include tylenol. Pt is currently afebrile. Mentioned that provider may want Pt to be evaluated for infectious cultures including blood sampling ad Pt stated he had just been through this and wondered if this could be done during his Thursday infusion appt. Messaged provider.

## 2023-01-10 NOTE — ED TRIAGE NOTES
10 days s/p cholecystectomy with 3 days of chills/fever; cancer hx/chemo ongoing.     Triage Assessment     Row Name 01/10/23 3688       Triage Assessment (Adult)    Airway WDL WDL       Respiratory WDL    Respiratory WDL WDL       Skin Circulation/Temperature WDL    Skin Circulation/Temperature WDL X  pale       Cardiac WDL    Cardiac WDL WDL       Peripheral/Neurovascular WDL    Peripheral Neurovascular WDL WDL       Cognitive/Neuro/Behavioral WDL    Cognitive/Neuro/Behavioral WDL WDL

## 2023-01-10 NOTE — ED PROVIDER NOTES
EMERGENCY DEPARTMENT ENCOUNTER      NAME: Ifrah Huitron  AGE: 72 year old male  YOB: 1950  MRN: [id number]  EVALUATION DATE & TIME: No admission date for patient encounter.    PCP: Sukhdev Kohler [facility]    ED PROVIDER: Russell Márquez M.D.      Chief Complaint   Patient presents with     Fever         FINAL IMPRESSION:  Fever  Adrenal insufficiency  Sarcoma      ED COURSE & MEDICAL DECISION MAKING:    Pertinent Labs & Imaging studies reviewed. (See chart for details)  72 year old male presents to the Emergency Department for evaluation of fevers, urinary frequency.  She has had apparent shaking chills and fevers for last few days.  Temperature to 103 on 1 occasion.  No other family members ill.  No obvious sick contacts.  Patient with known metastatic sarcoma.  No ongoing chemotherapy for 2 months.  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  Did increase his Solu-Cortef 3 days ago with onset of fevers as previously directed.  Concerned about possibility of worsening illness.  Has been vaccinated for COVID.  On exam he is a frail-appearing elderly male in minimal distress.  Heart and lungs unremarkable.  Abdomen soft and nontender.  Some slight lower left flank/back pain which she says is chronic and related to sarcoma.  Initial laboratory evaluation is reassuring.  White cell count is normal.  Electrolytes without evidence of acidosis.  Slight renal insufficiency.  Mild anemia at 8.8 however this is unchanged.  Awaiting urine analysis and chest x-ray.  Patient appears non toxic with stable vitals signs. Overall exam is benign.    5:47 PM I met with the patient for the initial interview and physical examination. Discussed plan for treatment and workup in the ED.   6:51 PM chest x-ray and urine analysis without evidence of infectious process.  Spoke with Dr. Williamson, Oncology, regarding patient plan of care.  Recommendations are for empiric antibiotics given his adrenal insufficiency and  cancer.  We will proceed with outpatient Levaquin x7 days.  Patient has scheduled follow-up in 2 days.  Routine return precautions given    Medical Decision Making    History:    Supplemental history from: Documented in HPI, if applicable    External Record(s) reviewed: Documented in [facility], if applicable.    Work Up:    Chart documentation includes differential considered and any EKGs or imaging independently interpreted by provider.    In additional to work up documented, I considered the following work up: See chart documentation, if applicable.    External consultation:    Discussion of management with another provider: Hematology - Oncology    Complicating factors:    Care impacted by chronic illness: Cancer/Chemotherapy    Care affected by social determinants of health: N/A    Disposition considerations: Discharge with antibiotics    At the conclusion of the encounter I discussed the results of all of the tests and the disposition. The questions were answered and return precautions provided. The patient or family acknowledged understanding and was agreeable with the care plan.       PPE: Provider wore gloves, N95 mask, eye protection, surgical cap, and paper mask.     MEDICATIONS GIVEN IN THE EMERGENCY:  Medications   0.9% sodium chloride BOLUS (0 mLs Intravenous Stopped 1/10/23 1734)       NEW PRESCRIPTIONS STARTED AT TODAY'S ER VISIT  Current Discharge Medication List      START taking these medications    Details   levofloxacin (LEVAQUIN) 500 MG tablet Take 1 tablet (500 mg) by mouth daily for 7 days  Qty: 7 tablet, Refills: 0                =================================================================    [facility]    Patient information was obtained from: Patient    Use of Intrepreter: N/A       Ifrah Huitron is a 72 year old male with a pertient medical history of generalized anxiety disorder, thyroid disease, spindle cell sarcoma, malignant mesothelioma, pituitary macroadenoma, and stage 3a chronic kidney  disease who presents to the ED for evaluation of a fever.    The patient reports 3 days of shakiness, chills, and a fever with Tmax 103 F. Also endorses urinary frequency and soft stool, but denies diarrhea. No sick known contacts. No Covid-19 exposure, he has been vaccinated. The patient reports that he was diagnosed with cancer 2 years ago. He states that he stopped his votrient therapy 2 months ago as it made him ill, and is scheduled to begin a new therapy on Monday. Endorses lower back pain associated with cancer diagnosis. He notes that he had his pituitary removed a few years ago and is currently on hydrocortisone, which he recently doubled his dose since onset of fever. The patient otherwise denies a cough or any other complaints at this time.    REVIEW OF SYSTEMS   Constitutional:  Positive for shakiness, chills, and fever (Tmax 103 F).  Respiratory:  Denies productive cough or increased work of breathing  Cardiovascular:  Denies chest pain, palpitations  GI:  Denies abdominal pain, nausea, or vomiting. Positive for urinary frequency and soft stool. Negative for diarrhea.   Musculoskeletal:  Denies any new muscle/joint swelling. Positive for lower back pain.  Skin:  Denies rash   Neurologic:  Denies focal weakness  All systems negative except as marked.     PAST MEDICAL HISTORY:  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     Thyroid disease     removed pituitary gland       PAST SURGICAL HISTORY:  Past Surgical History:   Procedure Laterality Date     COLONOSCOPY N/A 12/17/2020    Procedure: COLONOSCOPY;  Surgeon: Ken Camacho MD;  Location: [facility]     ENDOSCOPIC RETROGRADE CHOLANGIOPANCREATOGRAM N/A 12/23/2022    Procedure: ENDOSCOPIC RETROGRADE CHOLANGIOPANCREATOGRAPHY;  Surgeon: Jim Lamar MD;  Location: [facility] OR     ENT SURGERY       ESOPHAGOSCOPY, GASTROSCOPY, DUODENOSCOPY (EGD), COMBINED  N/A 12/27/2022    Procedure: ESOPHAGOGASTRODUODENOSCOPY (EGD);  Surgeon: Brenton Francois MD;  Location: [facility] GI     HERNIA REPAIR       INSERT PORT VASCULAR ACCESS Right 1/28/2022    Procedure: INSERTION, VASCULAR ACCESS PORT;  Surgeon: Daniel Kinney MD;  Location: [facility] OR     IR CHEST PORT PLACEMENT > 5 YRS OF AGE  1/28/2022     LAPAROSCOPIC CHOLECYSTECTOMY N/A 12/24/2022    Procedure: CHOLECYSTECTOMY, LAPAROSCOPIC;  Surgeon: Froy More DO;  Location: [facility] OR     LARYNGOSCOPY, EXCISE VOCAL CORD LESION MICROSCOPIC, COMBINED Left 07/01/2021    Procedure: MICROLARYNGOSCOPY, LEFT TRUE VOCAL CORD INJECTION WITH PROLARYN;  Surgeon: Kyree Bearden MD;  Location: [facility] OR     PHACOEMULSIFICATION WITH STANDARD INTRAOCULAR LENS IMPLANT Right 03/10/2021    Procedure: Cataract removal with implant.;  Surgeon: Jamir Mac MD;  Location: [facility] OR     PHACOEMULSIFICATION WITH STANDARD INTRAOCULAR LENS IMPLANT Left 04/05/2021    Procedure: Cataract removal with implant.;  Surgeon: Jamir Mac MD;  Location: [facility] OR     PICC DOUBLE LUMEN PLACEMENT Right 12/01/2021    5FR DL PICC, basilic vein. L-38cm, 1cm out.     PICC DOUBLE LUMEN PLACEMENT Right 01/04/2022    Right cephalic, 41 cm, 1 external length     PITUITARY EXCISION       tooth pulled 4/7  Right          CURRENT MEDICATIONS:    No current facility-administered medications for this encounter.    Current Outpatient Medications:      acetaminophen (TYLENOL) 325 MG tablet, Take 2 tablets (650 mg) by mouth every 6 hours as needed for mild pain or other (and adjunct with moderate or severe pain or per patient request), Disp: , Rfl:      amLODIPine (NORVASC) 5 MG tablet, Take 1 tablet (5 mg) by mouth daily, Disp: 30 tablet, Rfl: 3     aspirin-acetaminophen-caffeine (EXCEDRIN MIGRAINE) 250-250-65 MG tablet, Take 1 tablet by mouth daily as needed for headaches, Disp: , Rfl:      Aspirin-Caffeine (JUAN A BACK & BODY PO), Take 2  "tablets by mouth 2 times daily as needed, Disp: , Rfl:      doxepin (SINEQUAN) 10 MG/ML (HIGH CONC) solution, Take 2.5 mLs (25 mg) by mouth 2 times daily as needed (rinse for mucositis) Dilute the 2.5 mL of doxepin to 5 ml total in sterile or distilled water., Disp: 118 mL, Rfl: 0     guaiFENesin-codeine (GUAIFENESIN AC) 100-10 MG/5ML syrup, Take 10 mLs by mouth every 4 hours as needed for cough, Disp: 118 mL, Rfl: 0     hydrocortisone (CORTEF) 10 MG tablet, Take 20 mg in the morning and 10 mg in the afternoon, Disp: 270 tablet, Rfl: 2     Insulin Syringe-Needle U-100 27.5G X 5/8\" 2 ML MISC, 1 each daily as needed (with solucortef for adrenal crisis), Disp: 10 each, Rfl: 1     levothyroxine (SYNTHROID/LEVOTHROID) 75 MCG tablet, Take one tab p.o. 6 days per week and 0.5 tabs one day per week ( total of 6.5 tablets weekly), Disp: 90 tablet, Rfl: 2     lidocaine (XYLOCAINE) 5 % external ointment, Apply topically 4 times daily as needed for moderate pain (4-6), Disp: 240 g, Rfl: 1     lisinopril (ZESTRIL) 20 MG tablet, Take 1 tablet (20 mg) by mouth daily, Disp: 30 tablet, Rfl: 0     Menthol-Methyl Salicylate (DALIA MALIK GREASELESS) cream, Apply topically every 6 hours as needed, Disp: , Rfl:      methocarbamol (ROBAXIN) 500 MG tablet, Take 1 tablet (500 mg) by mouth every 6 hours, Disp: 20 tablet, Rfl: 0     naloxone (NARCAN) 4 MG/0.1ML nasal spray, Spray 1 spray (4 mg) into one nostril alternating nostrils as needed for opioid reversal every 2-3 minutes until assistance arrives (Patient not taking: Reported on 1/9/2023), Disp: 0.2 mL, Rfl: 0     nystatin (MYCOSTATIN) 122690 UNIT/ML suspension, Swish and spit 5 mLs (500,000 Units) in mouth 4 times daily, Disp: 473 mL, Rfl: 1     ondansetron (ZOFRAN) 4 MG tablet, Take 1-2 tablets (4-8 mg) by mouth every 8 hours as needed for nausea, Disp: 60 tablet, Rfl: 3     oxyCODONE (ROXICODONE) 5 MG tablet, Take 1 tablet (5 mg) by mouth every 4 hours as needed for moderate to severe " pain, Disp: 60 tablet, Rfl: 0     [START ON 3/6/2023] pazopanib (VOTRIENT) 200 MG tablet, Take 4 tablets (800 mg) by mouth daily Take on an empty stomach 1 hour before or 2 hours after a meal., Disp: 120 tablet, Rfl: 11     pazopanib (VOTRIENT) 200 MG tablet, Take 4 tablets (800 mg) by mouth daily Take on an empty stomach 1 hour before or 2 hours after a meal., Disp: 56 tablet, Rfl: 0     pazopanib (VOTRIENT) 200 MG tablet, Take 4 tablets (800 mg) by mouth daily Take on an empty stomach 1 hour before or 2 hours after a meal., Disp: 120 tablet, Rfl: 0     phosphorus tablet 250 mg (PHOSPHA 250 NEUTRAL) 250 MG per tablet, Take 1 tablet (250 mg) by mouth daily, Disp: , Rfl:      potassium chloride ER (KLOR-CON M) 20 MEQ CR tablet, Take 1 tablet (20 mEq) by mouth daily, Disp: 90 tablet, Rfl: 3     prochlorperazine (COMPAZINE) 5 MG tablet, Take 1 tablet (5 mg) by mouth every 6 hours as needed for nausea or vomiting . Caution: causes sedation., Disp: 30 tablet, Rfl: 1     senna-docusate (SENOKOT-S/PERICOLACE) 8.6-50 MG tablet, Take 1 tablet by mouth 2 times daily as needed for constipation (Patient not taking: Reported on 1/9/2023), Disp: 30 tablet, Rfl: 1     SUMAtriptan (IMITREX) 50 MG tablet, Take 1 tablet (50 mg) by mouth at onset of headache for migraine May repeat in 2 hours. Max 4 tablets/24 hours., Disp: 10 tablet, Rfl: 3     triamcinolone (KENALOG) 0.1 % external cream, Apply topically 2 times daily as needed to bothersome skin areas. (Patient not taking: Reported on 1/9/2023), Disp: , Rfl:     ALLERGIES:  Allergies   Allergen Reactions     Penicillins Hives and Swelling     Occurred as small child   Pt tolerated meropenem 12/23/22         FAMILY HISTORY:  Family History   Problem Relation Age of Onset     Lupus Mother      ALS Father      Rheumatoid Arthritis Sister        SOCIAL HISTORY:   Social History     Socioeconomic History     Marital status:    Tobacco Use     Smoking status: Never     Smokeless  tobacco: Never   Substance and Sexual Activity     Alcohol use: Yes     Comment: rare     Drug use: Never     Social Determinants of Health     Financial Resource Strain: Low Risk      Difficulty of Paying Living Expenses: Not very hard   Food Insecurity: No Food Insecurity     Worried About Running Out of Food in the Last Year: Never true     Ran Out of Food in the Last Year: Never true   Transportation Needs: No Transportation Needs     Lack of Transportation (Medical): No     Lack of Transportation (Non-Medical): No   Intimate Partner Violence: Unknown     Fear of Current or Ex-Partner: No     Emotionally Abused: No     Sexually Abused: No       VITALS:  Patient Vitals for the past 24 hrs:   BP Temp Pulse Resp SpO2 Weight   01/10/23 1556 131/79 98  F (36.7  C) 88 20 98 % 63.5 kg (140 lb)        PHYSICAL EXAM    Constitutional:  Awake, alert. Frail, elderly male in mild distress.  HENT:  Normocephalic, Atraumatic. Bilateral external ears normal. Oropharynx moist. Nose normal. Neck- Normal range of motion with no guarding  Eyes:  PERRL, EOMI with no signs of entrapment, Conjunctiva normal, No discharge.   Respiratory:  Normal breath sounds, No respiratory distress, No wheezing.    Cardiovascular:  Normal heart rate, Normal rhythm, No appreciable rubs or gallops.   GI:  Soft, No tenderness, No distension, No palpable masses  Musculoskeletal:  Intact distal pulses, No edema. Good range of motion in all major joints. No major deformities noted. Tenderness in right lower lumbar/pelvic region.   Integument:  Warm, Dry, No erythema, No rash.   Neurologic:  Alert & oriented, Normal motor function, Normal sensory function, No focal deficits noted.   Psychiatric:  Affect normal, Judgment normal, Mood normal.     LAB:  All pertinent labs reviewed and interpreted.  Results for orders placed or performed during the hospital encounter of 01/10/23   Chest XR,  PA & LAT    Impression    IMPRESSION: Right-sided port catheter in  satisfactory position. Trace left pleural fluid. The lungs are clear. Normal heart size. Spinal degenerative changes.   Basic metabolic panel   Result Value Ref Range    Sodium 139 136 - 145 mmol/L    Potassium 3.8 3.4 - 5.3 mmol/L    Chloride 101 98 - 107 mmol/L    Carbon Dioxide (CO2) 29 22 - 29 mmol/L    Anion Gap 9 7 - 15 mmol/L    Urea Nitrogen 26.1 (H) 8.0 - 23.0 mg/dL    Creatinine 1.49 (H) 0.67 - 1.17 mg/dL    Calcium 9.3 8.8 - 10.2 mg/dL    Glucose 122 (H) 70 - 99 mg/dL    GFR Estimate 49 (L) >60 mL/min/1.73m2   Result Value Ref Range    Magnesium 1.8 1.7 - 2.3 mg/dL   Lactic acid whole blood   Result Value Ref Range    Lactic Acid 1.0 0.7 - 2.0 mmol/L   UA with Microscopic reflex to Culture    Specimen: Urine, Clean Catch   Result Value Ref Range    Color Urine Light Yellow Colorless, Straw, Light Yellow, Yellow    Appearance Urine Clear Clear    Glucose Urine Negative Negative mg/dL    Bilirubin Urine Negative Negative    Ketones Urine Negative Negative mg/dL    Specific Gravity Urine 1.020 1.001 - 1.030    Blood Urine 0.06 mg/dL (A) Negative    pH Urine 5.5 5.0 - 7.0    Protein Albumin Urine 50 (A) Negative mg/dL    Urobilinogen Urine <2.0 <2.0 mg/dL    Nitrite Urine Negative Negative    Leukocyte Esterase Urine Negative Negative    Mucus Urine Present (A) None Seen /LPF    RBC Urine <1 <=2 /HPF    WBC Urine 1 <=5 /HPF    Squamous Epithelials Urine 1 <=1 /HPF    Transitional Epithelials Urine <1 <=1 /HPF    Granular Casts Urine 10 (H) None Seen /LPF   Hepatic function panel   Result Value Ref Range    Protein Total 5.9 (L) 6.4 - 8.3 g/dL    Albumin 3.4 (L) 3.5 - 5.2 g/dL    Bilirubin Total 0.4 <=1.2 mg/dL    Alkaline Phosphatase 197 (H) 40 - 129 U/L    AST 21 10 - 50 U/L    ALT 18 10 - 50 U/L    Bilirubin Direct <0.20 0.00 - 0.30 mg/dL   Symptomatic Influenza A/B & SARS-CoV2 (COVID-19) Virus PCR Multiplex Nasopharyngeal    Specimen: Nasopharyngeal; Swab   Result Value Ref Range    Influenza A PCR  Negative Negative    Influenza B PCR Negative Negative    RSV PCR Negative Negative    SARS CoV2 PCR Negative Negative   CBC with platelets and differential   Result Value Ref Range    WBC Count 9.6 4.0 - 11.0 10e3/uL    RBC Count 2.88 (L) 4.40 - 5.90 10e6/uL    Hemoglobin 8.8 (L) 13.3 - 17.7 g/dL    Hematocrit 27.9 (L) 40.0 - 53.0 %    MCV 97 78 - 100 fL    MCH 30.6 26.5 - 33.0 pg    MCHC 31.5 31.5 - 36.5 g/dL    RDW 17.2 (H) 10.0 - 15.0 %    Platelet Count 148 (L) 150 - 450 10e3/uL    % Neutrophils 89 %    % Lymphocytes 7 %    % Monocytes 3 %    % Eosinophils 0 %    % Basophils 0 %    % Immature Granulocytes 1 %    NRBCs per 100 WBC 0 <1 /100    Absolute Neutrophils 8.6 (H) 1.6 - 8.3 10e3/uL    Absolute Lymphocytes 0.6 (L) 0.8 - 5.3 10e3/uL    Absolute Monocytes 0.3 0.0 - 1.3 10e3/uL    Absolute Eosinophils 0.0 0.0 - 0.7 10e3/uL    Absolute Basophils 0.0 0.0 - 0.2 10e3/uL    Absolute Immature Granulocytes 0.1 <=0.4 10e3/uL    Absolute NRBCs 0.0 10e3/uL       RADIOLOGY:  Reviewed all pertinent imaging. Please see official radiology report.  Chest XR,  PA & LAT   Final Result   IMPRESSION: Right-sided port catheter in satisfactory position. Trace left pleural fluid. The lungs are clear. Normal heart size. Spinal degenerative changes.            I, Kerry Gomez, am serving as a scribe to document services personally performed by Russell Márquez MD, based on my observation and the provider's statements to me. I, Russell Márquez MD attest that Kerry Gomez is acting in a scribe capacity, has observed my performance of the services and has documented them in accordance with my direction.    Russell Márquez M.D.  Emergency Medicine  [facility] EMERGENCY DEPARTMENT     Russell Márquez MD  01/10/23 1908       Russell Márquez MD  01/10/23 1910       Russell Márquez MD  01/10/23 2159

## 2023-01-10 NOTE — ED NOTES
Expected Patient Referral to ED  2:00 PM    Referring Clinic/Provider:  Oncology    Reason for referral/Clinical facts:  Metastatic sarcoma. Not on chemo. Fever and chills and frequent urination and abdominal pain. Cholecystectomy at the end of december    Recommendations provided:  Send to ED for further evaluation    Caller was informed that this institution does possess the capabilities and/or resources to provide for patient and should be transferred to our facility.    Kalani Porter MD  [facility] EMERGENCY DEPARTMENT  [address] [place] [id number] [phone]       Kalani Porter MD  01/10/23 3638

## 2023-01-11 NOTE — DISCHARGE INSTRUCTIONS
You have been started on antibiotics and concerns of potential bacteremia or hidden infection.  Return for absolutely any worsening.  Discussed your Solu-Cortef with your doctor in the morning.

## 2023-01-12 NOTE — PROGRESS NOTES
Infusion Nursing Note:  Ifrah Huitron presents today for fluids and one unit of PRBCs    Patient seen by provider today: No   present during visit today: Not Applicable.    Note: N/A.    Intravenous Access:  Labs drawn without difficulty.  Implanted Port.    Treatment Conditions:  Lab Results   Component Value Date    HGB 7.7 (L) 01/12/2023    WBC 8.2 01/12/2023    ANEU 15.5 (H) 12/26/2022    ANEUTAUTO 6.9 01/12/2023     (L) 01/12/2023      Results reviewed, labs MET treatment parameters, ok to proceed with treatment.  Blood transfusion consent signed 2/17/22.    Post Infusion Assessment:  Patient tolerated transfusion without incident.  Blood return noted pre and post infusion.  Site patent and intact, free from redness, edema or discomfort.  No evidence of extravasations.  Access discontinued per protocol.       Discharge Plan:   Patient discharged in stable condition accompanied by: self.  Departure Mode: Ambulatory.  Pt to return on 1/29/23 at 9:45 am for pac labs followed by fluids and possible blood transfusion.      Cathy Mann RN

## 2023-01-12 NOTE — PROGRESS NOTES
Port already accessed.  Labs drawn from port without incident.  Pt tolerated well.    Cathy Mann RN

## 2023-01-17 NOTE — PROGRESS NOTES
Ifrah is a 72 year old who is being evaluated via a billable video visit.   Currently in [place].    How would you like to obtain your AVS? MyChart  If the video visit is dropped, the invitation should be resent by: Send to e-mail at: [email or web address]  Will anyone else be joining your video visit? JENNIFER Wilburn      Video-Visit Details    Type of service:  Video Visit   Video Start Time: 1:30pm  Video End Time: 2:15pm    Originating Location (pt. 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 History as follows:  - March 2021 presented with chest pain and back pain, imaging with lung mets and biopsy of mediastinal mass consistent with spindle cell sarcoma with high PD-L1. Baseline imaging lung mets, left sided subdiaphragmatic mass, liver lesions.   - June 2021-October 2021 Pembrolizumab, tolerated well, stopped due to disease progression  - October 2021-February 2022 Doxil + Ifos, not tolerated well, required multiple treatment delays, IVF support, dose reductions, horrible mucositis. Stopped due to tolerance  - March 2022-August 2022 Doxil alone, not tolerated well with ongoing skin and mouth toxicity, requiring delays and dose reductions. Stopped due to disease progression  - August 2022-November 2022 Gemzar alone, not tolerated well with cytopenias and edema, stopped due to disease progression  - November 2022 Trabectedin, only had one cycle. Horrible toxicity with HUSSEIN, hepatic toxicity, cytopenias, required hospital admission. Stopped due to poor tolerance and disease progression  -December 2022 Admission for acute cholecystitis s/p lap carmelita 12/24/22, repeat admission after discharge for sepsis and pain    Plan to start  Pazopanib as next line therapy for sarcoma, however haven't been able to start due to ongoing fatigue, fevers (ED visit 1/10/23 discharged on Levaquin), and poorly controlled pain.    Interval History:  Ifrah is seen today on video with his wife. He is not doing well. His fevers/chills did resolve with Levaquin, however he had more nausea and sore stomach with the antibiotic. Is taking Zofran and Compazine scheduled alternating during the day for this more persistent nausea. He has a poor appetite, ongoing poor taste, and is not eating much. He does think he is staying hydrated. Urine is clear. He hasn't had a bowel movement for a few days but attributes this to not eating much.     His L abdominal/back pain continues to be severe. He does get relief from Oxycodone and lidocaine patches, however he is worried about taking too much.     He denies dizziness but does feel weak and can barely get up the stairs. Breathing is stable, as is cough. No edema, rashes.     Mood is worse since stopping Lexapro (stopped due to concerns for QT prolongation with pazopanib).     Current Outpatient Medications   Medication Sig Dispense Refill     acetaminophen (TYLENOL) 325 MG tablet Take 2 tablets (650 mg) by mouth every 6 hours as needed for mild pain or other (and adjunct with moderate or severe pain or per patient request)       amLODIPine (NORVASC) 5 MG tablet Take 1 tablet (5 mg) by mouth daily 30 tablet 3     aspirin-acetaminophen-caffeine (EXCEDRIN MIGRAINE) 250-250-65 MG tablet Take 1 tablet by mouth daily as needed for headaches       Aspirin-Caffeine (JUAN A BACK & BODY PO) Take 2 tablets by mouth 2 times daily as needed       doxepin (SINEQUAN) 10 MG/ML (HIGH CONC) solution Take 2.5 mLs (25 mg) by mouth 2 times daily as needed (rinse for mucositis) Dilute the 2.5 mL of doxepin to 5 ml total in sterile or distilled water. 118 mL 0     guaiFENesin-codeine (GUAIFENESIN AC) 100-10 MG/5ML syrup Take 10 mLs by mouth every 4 hours  "as needed for cough 118 mL 0     hydrocortisone (CORTEF) 10 MG tablet Take 20 mg in the morning and 10 mg in the afternoon 270 tablet 2     Insulin Syringe-Needle U-100 27.5G X 5/8\" 2 ML MISC 1 each daily as needed (with solucortef for adrenal crisis) 10 each 1     levofloxacin (LEVAQUIN) 500 MG tablet Take 1 tablet (500 mg) by mouth daily for 7 days 7 tablet 0     levothyroxine (SYNTHROID/LEVOTHROID) 75 MCG tablet Take one tab p.o. 6 days per week and 0.5 tabs one day per week ( total of 6.5 tablets weekly) 90 tablet 2     lidocaine (XYLOCAINE) 5 % external ointment Apply topically 4 times daily as needed for moderate pain (4-6) 240 g 1     lisinopril (ZESTRIL) 20 MG tablet Take 1 tablet (20 mg) by mouth daily 30 tablet 0     Menthol-Methyl Salicylate (DALIA MALIK GREASELESS) cream Apply topically every 6 hours as needed       methocarbamol (ROBAXIN) 500 MG tablet Take 1 tablet (500 mg) by mouth every 6 hours 20 tablet 0     naloxone (NARCAN) 4 MG/0.1ML nasal spray Spray 1 spray (4 mg) into one nostril alternating nostrils as needed for opioid reversal every 2-3 minutes until assistance arrives (Patient not taking: Reported on 1/9/2023) 0.2 mL 0     nystatin (MYCOSTATIN) 826564 UNIT/ML suspension Swish and spit 5 mLs (500,000 Units) in mouth 4 times daily 473 mL 1     ondansetron (ZOFRAN) 4 MG tablet Take 1-2 tablets (4-8 mg) by mouth every 8 hours as needed for nausea 60 tablet 3     oxyCODONE (ROXICODONE) 5 MG tablet Take 1 tablet (5 mg) by mouth every 4 hours as needed for moderate to severe pain 60 tablet 0     [START ON 3/6/2023] pazopanib (VOTRIENT) 200 MG tablet Take 4 tablets (800 mg) by mouth daily Take on an empty stomach 1 hour before or 2 hours after a meal. 120 tablet 11     pazopanib (VOTRIENT) 200 MG tablet Take 4 tablets (800 mg) by mouth daily Take on an empty stomach 1 hour before or 2 hours after a meal. 56 tablet 0     pazopanib (VOTRIENT) 200 MG tablet Take 4 tablets (800 mg) by mouth daily Take on " an empty stomach 1 hour before or 2 hours after a meal. 120 tablet 0     phosphorus tablet 250 mg (PHOSPHA 250 NEUTRAL) 250 MG per tablet Take 1 tablet (250 mg) by mouth daily       potassium chloride ER (KLOR-CON M) 20 MEQ CR tablet Take 1 tablet (20 mEq) by mouth daily 90 tablet 3     prochlorperazine (COMPAZINE) 5 MG tablet Take 1 tablet (5 mg) by mouth every 6 hours as needed for nausea or vomiting . Caution: causes sedation. 30 tablet 1     senna-docusate (SENOKOT-S/PERICOLACE) 8.6-50 MG tablet Take 1 tablet by mouth 2 times daily as needed for constipation (Patient not taking: Reported on 1/9/2023) 30 tablet 1     SUMAtriptan (IMITREX) 50 MG tablet Take 1 tablet (50 mg) by mouth at onset of headache for migraine May repeat in 2 hours. Max 4 tablets/24 hours. 10 tablet 3     triamcinolone (KENALOG) 0.1 % external cream Apply topically 2 times daily as needed to bothersome skin areas. 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     Thyroid disease     removed pituitary gland     Past Surgical History:   Procedure Laterality Date     COLONOSCOPY N/A 12/17/2020    Procedure: COLONOSCOPY;  Surgeon: Ken Camacho MD;  Location: [facility]     ENDOSCOPIC RETROGRADE CHOLANGIOPANCREATOGRAM N/A 12/23/2022    Procedure: ENDOSCOPIC RETROGRADE CHOLANGIOPANCREATOGRAPHY;  Surgeon: Jim Lamar MD;  Location: [facility] [place] OR     ENT SURGERY       ESOPHAGOSCOPY, GASTROSCOPY, DUODENOSCOPY (EGD), COMBINED N/A 12/27/2022    Procedure: ESOPHAGOGASTRODUODENOSCOPY (EGD);  Surgeon: Brenton Francois MD;  Location: [facility] GI     HERNIA REPAIR       INSERT PORT VASCULAR ACCESS Right 1/28/2022    Procedure: INSERTION, VASCULAR ACCESS PORT;  Surgeon: Daniel Kinney MD;  Location: [facility] OR     IR CHEST PORT PLACEMENT > 5 YRS OF AGE  1/28/2022     LAPAROSCOPIC  CHOLECYSTECTOMY N/A 12/24/2022    Procedure: CHOLECYSTECTOMY, LAPAROSCOPIC;  Surgeon: Froy More DO;  Location: [facility] OR     LARYNGOSCOPY, EXCISE VOCAL CORD LESION MICROSCOPIC, COMBINED Left 07/01/2021    Procedure: MICROLARYNGOSCOPY, LEFT TRUE VOCAL CORD INJECTION WITH PROLARYN;  Surgeon: Kyree Bearden MD;  Location: [facility] OR     PHACOEMULSIFICATION WITH STANDARD INTRAOCULAR LENS IMPLANT Right 03/10/2021    Procedure: Cataract removal with implant.;  Surgeon: Jamir Mac MD;  Location: [facility] OR     PHACOEMULSIFICATION WITH STANDARD INTRAOCULAR LENS IMPLANT Left 04/05/2021    Procedure: Cataract removal with implant.;  Surgeon: Jamir Mac MD;  Location: [facility] OR     PICC DOUBLE LUMEN PLACEMENT Right 12/01/2021    5FR DL PICC, basilic vein. L-38cm, 1cm out.     PICC DOUBLE LUMEN PLACEMENT Right 01/04/2022    Right cephalic, 41 cm, 1 external length     PITUITARY EXCISION       tooth pulled 4/7  Right      Allergies   Allergen Reactions     Penicillins Hives and Swelling     Occurred as small child   Pt tolerated meropenem 12/23/22       Social History   Social History     Tobacco Use     Smoking status: Never     Smokeless tobacco: Never   Substance Use Topics     Alcohol use: Yes     Comment: rare     Drug use: Never      Past medical history and social history were reviewed.    Physical Examination:  There were no vitals taken for this visit.  Wt Readings from Last 10 Encounters:   01/10/23 63.5 kg (140 lb)   01/05/23 61.5 kg (135 lb 8 oz)   12/27/22 65 kg (143 lb 6.4 oz)   12/26/22 62.1 kg (137 lb)   12/22/22 62.5 kg (137 lb 12.8 oz)   12/22/22 70.3 kg (155 lb)   11/26/22 70.7 kg (155 lb 14.4 oz)   11/23/22 68 kg (150 lb)   10/19/22 67.9 kg (149 lb 12.8 oz)   10/06/22 65 kg (143 lb 3.2 oz)     Video physical exam  General: Patient appears well in no acute distress.   Skin: No visualized rash or lesions on visualized skin  Eyes: EOMI, no erythema, sclera icterus or discharge  noted  Resp: Appears to be breathing comfortably without accessory muscle usage, speaking in full sentences, no cough  MSK: Appears to have normal range of motion based on visualized movements  Neurologic: No apparent tremors, facial movements symmetric  Psych: affect understandably depressed, alert and oriented    Laboratory Data:   Latest Reference Range & Units 01/12/23 09:31   Sodium 136 - 145 mmol/L 139   Potassium 3.4 - 5.3 mmol/L 3.6   Chloride 98 - 107 mmol/L 101   Carbon Dioxide (CO2) 22 - 29 mmol/L 28   Urea Nitrogen 8.0 - 23.0 mg/dL 30.4 (H)   Creatinine 0.67 - 1.17 mg/dL 1.70 (H)   GFR Estimate >60 mL/min/1.73m2 42 (L)   Calcium 8.8 - 10.2 mg/dL 9.7   Anion Gap 7 - 15 mmol/L 10   Magnesium 1.7 - 2.3 mg/dL 1.7   Phosphorus 2.5 - 4.5 mg/dL 3.7   Albumin 3.5 - 5.2 g/dL 3.2 (L)   Protein Total 6.4 - 8.3 g/dL 5.7 (L)   Alkaline Phosphatase 40 - 129 U/L 169 (H)   ALT 10 - 50 U/L 16   AST 10 - 50 U/L 19   Bilirubin Total <=1.2 mg/dL 0.3   Glucose 70 - 99 mg/dL 125 (H)   T4 Free 0.90 - 1.70 ng/dL 1.63   WBC 4.0 - 11.0 10e3/uL 8.2   Hemoglobin 13.3 - 17.7 g/dL 7.7 (L)   Hematocrit 40.0 - 53.0 % 24.4 (L)   Platelet Count 150 - 450 10e3/uL 144 (L)   RBC Count 4.40 - 5.90 10e6/uL 2.54 (L)   MCV 78 - 100 fL 96   MCH 26.5 - 33.0 pg 30.3   MCHC 31.5 - 36.5 g/dL 31.6   RDW 10.0 - 15.0 % 16.8 (H)   % Neutrophils % 83   % Lymphocytes % 8   % Monocytes % 6   % Eosinophils % 1   % Basophils % 0   Absolute Basophils 0.0 - 0.2 10e3/uL 0.0   Absolute Eosinophils 0.0 - 0.7 10e3/uL 0.0   Absolute Immature Granulocytes <=0.4 10e3/uL 0.2   Absolute Lymphocytes 0.8 - 5.3 10e3/uL 0.6 (L)   Absolute Monocytes 0.0 - 1.3 10e3/uL 0.5   % Immature Granulocytes % 2   Absolute Neutrophils 1.6 - 8.3 10e3/uL 6.9   Absolute NRBCs 10e3/uL 0.0   NRBCs per 100 WBC <1 /100 0   (H): Data is abnormally high  (L): Data is abnormally low      Assessment and Plan:  #  Metastatic spindle cell sarcoma  S/p progression on multiple lines of therapy.  Has  not tolerated prior chemotherapy well. Most recently received Trabectedin x 1 cycle six weeks ago; complicated by HUSSEIN, hepatic toxicity, nausea, anorexia, dehydration, pancytopenia. Now more recently course complicated by acute cholecystitis s/p lap carmelita 12/24, re-admission for sepsis and anemia.    Next line therapy is Pazopanib, has not yet started. Clinically he has continued to decline- weak, not eating, having more pain, nausea. ECOG 2. Concern progressive malignancy is the cause of his ongoing decline. He has had significant toxicities with past chemotherapy regimens.    Had long discussion with patient about goals of care. He has metastatic, terminal cancer that has progressed on five prior therapies. The chance of substantial improvement with Pazopanib is low and I worry about side effects given how poorly he is doing now, and how poorly he has tolerated chemotherapy in the past.    Discussed the option of no further treatment and best supportive cares/hospice. He has been thinking about this as well. He would like to review with his wife over the weekend.    HOLD on starting Pazopanib during ongoing goals of care discussion. Will do another virtual visit 1/23/23.     Will update Dr. Wolff.      #  Chronic/recurrent mucositis  Ongoing throughout all of treatment. No thrush currently. Continue salt/soda and Doxepin PRN.      # Poor Oral Intake  # Dehydration  # Renal Insufficiency   # Hypokalemia  # Hypophosphatemia  Was requiring IVF 1-2 days/week in [place]. Continues to do poorly. Creat continues to increase.    Continue weekly labs and IVF at [place]. Continue potassium and phos supplement. Confirmed he is not on NSAIDs.      # Acute LFT elevation with hyperbilirubinemia, resolving  # Obstructive cholelithiasis, s/p lap carmelita  He is 3 weeks post lap-carmelita. LFTs continue to improve.     # Chemo-related pancytopenia, resolved  # Acute/chronic anemia  2/2 chemotherapy. Recent hospitalization, noted to  have acute anemia 6.4 (baseline 7-8). No bleeding source identified on ERCP, EGD and CT abd. Surgery felt unlikely related to post-op loss following lap carmelita. Stabilized after pRBC. Holding Celebrex/NSAIDs. Could have been related to acute infectious process/reactive with sepsis. Now likely ongoing with progressive malignancy.     Hgb was 7.7 1/12/23 and he received 1 unit pRBC. Continue weekly monitoring and transfuse if hgb <8.       # Superficial Phlebitis, resolved  RUE on US 11/30/22. Continue warm compress, improving. Avoid anticoagulation with thrombocytopenia and no DVT. Symptoms are resolved.      # Cancer related pain  Increased pain left flank area related to progression of the splenic mass. Continues to worsen, concerning for disease progression off treatment. See GOC discussion above.    Continue Oxycodone and increase to 5-10mg every 4 hours PRN. Continue topical lidocaine. Avoid NSAIDs.     Will likely need palliative involvement in the near future.      # Hypopituitarism  # Hypothyroidism  Continues Hydrocortisone and Synthroid-confirmed he is taking. Stress doses hydrocortisone with acute illness. Follows with Endo. TSH WNL.     #   Nausea, dyspepsia  Continue Zofran and Compazine scheduled. Feels this is working and declines any additional antiemetics. Tums PRN.      #   Hoarseness  Likely secondary to mediastinal mass. Status post vocal cord injection in July with ENT     #  Depression/anxiety  Stopped Lexapro due to concerns with QT prolongation with Pazopanib. Since he is not sure he is going to start this, OK to restart Lexapro for now. QT reviewed from recent EKG and WNL.      #  Fevers/Chills  ED visit from 1/10/23 reviewed and no clear cause of infection. Empirically treated with Levaquin and fevers resolved.    60 minutes spent on the date of the encounter doing chart review, review of test results, interpretation of tests, patient visit and documentation     Maynor Rios  SHARON  Department of Hematology and Oncology  [facility]

## 2023-01-18 NOTE — NURSING NOTE
Patient declined individual allergy and medication review by support staff because medications and allergies were reviewed on 1/10 and pt states no changes.    Laurie Urbina, VF

## 2023-01-18 NOTE — LETTER
1/18/2023         RE: Ifrah Huitron  [address] [place] [address]        Dear Colleague,    Thank you for referring your patient, Ifrah Huitron, to the [facility]. Please see a copy of my visit note below.    Ifrah is a 72 year old who is being evaluated via a billable video visit.   Currently in [place].    How would you like to obtain your AVS? MyChart  If the video visit is dropped, the invitation should be resent by: Send to e-mail at: [email or web address]  Will anyone else be joining your video visit? No     JENNIFER Chacon      Video-Visit Details    Type of service:  Video Visit   Video Start Time: 1:30pm  Video End Time: 2:15pm    Originating Location (pt. 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 History as follows:  - March 2021 presented with chest pain and back pain, imaging with lung mets and biopsy of mediastinal mass consistent with spindle cell sarcoma with high PD-L1. Baseline imaging lung mets, left sided subdiaphragmatic mass, liver lesions.   - June 2021-October 2021 Pembrolizumab, tolerated well, stopped due to disease progression  - October 2021-February 2022 Doxil + Ifos, not tolerated well, required multiple treatment delays, IVF support, dose reductions, horrible mucositis. Stopped due to tolerance  - March 2022-August 2022 Doxil alone, not tolerated well with ongoing skin and mouth toxicity, requiring delays and dose reductions. Stopped due to disease progression  - August 2022-November 2022 Gemzar alone, not tolerated well with cytopenias and edema, stopped due to disease progression  - November 2022 Trabectedin, only had one cycle. Horrible toxicity with HUSSEIN, hepatic  toxicity, cytopenias, required hospital admission. Stopped due to poor tolerance and disease progression  -December 2022 Admission for acute cholecystitis s/p lap carmelita 12/24/22, repeat admission after discharge for sepsis and pain    Plan to start Pazopanib as next line therapy for sarcoma, however haven't been able to start due to ongoing fatigue, fevers (ED visit 1/10/23 discharged on Levaquin), and poorly controlled pain.    Interval History:  Ifrah is seen today on video with his wife. He is not doing well. His fevers/chills did resolve with Levaquin, however he had more nausea and sore stomach with the antibiotic. Is taking Zofran and Compazine scheduled alternating during the day for this more persistent nausea. He has a poor appetite, ongoing poor taste, and is not eating much. He does think he is staying hydrated. Urine is clear. He hasn't had a bowel movement for a few days but attributes this to not eating much.     His L abdominal/back pain continues to be severe. He does get relief from Oxycodone and lidocaine patches, however he is worried about taking too much.     He denies dizziness but does feel weak and can barely get up the stairs. Breathing is stable, as is cough. No edema, rashes.     Mood is worse since stopping Lexapro (stopped due to concerns for QT prolongation with pazopanib).     Current Outpatient Medications   Medication Sig Dispense Refill     acetaminophen (TYLENOL) 325 MG tablet Take 2 tablets (650 mg) by mouth every 6 hours as needed for mild pain or other (and adjunct with moderate or severe pain or per patient request)       amLODIPine (NORVASC) 5 MG tablet Take 1 tablet (5 mg) by mouth daily 30 tablet 3     aspirin-acetaminophen-caffeine (EXCEDRIN MIGRAINE) 250-250-65 MG tablet Take 1 tablet by mouth daily as needed for headaches       Aspirin-Caffeine (JUAN A BACK & BODY PO) Take 2 tablets by mouth 2 times daily as needed       doxepin (SINEQUAN) 10 MG/ML (HIGH CONC) solution Take  "2.5 mLs (25 mg) by mouth 2 times daily as needed (rinse for mucositis) Dilute the 2.5 mL of doxepin to 5 ml total in sterile or distilled water. 118 mL 0     guaiFENesin-codeine (GUAIFENESIN AC) 100-10 MG/5ML syrup Take 10 mLs by mouth every 4 hours as needed for cough 118 mL 0     hydrocortisone (CORTEF) 10 MG tablet Take 20 mg in the morning and 10 mg in the afternoon 270 tablet 2     Insulin Syringe-Needle U-100 27.5G X 5/8\" 2 ML MISC 1 each daily as needed (with solucortef for adrenal crisis) 10 each 1     levofloxacin (LEVAQUIN) 500 MG tablet Take 1 tablet (500 mg) by mouth daily for 7 days 7 tablet 0     levothyroxine (SYNTHROID/LEVOTHROID) 75 MCG tablet Take one tab p.o. 6 days per week and 0.5 tabs one day per week ( total of 6.5 tablets weekly) 90 tablet 2     lidocaine (XYLOCAINE) 5 % external ointment Apply topically 4 times daily as needed for moderate pain (4-6) 240 g 1     lisinopril (ZESTRIL) 20 MG tablet Take 1 tablet (20 mg) by mouth daily 30 tablet 0     Menthol-Methyl Salicylate (DALIA MALIK GREASELESS) cream Apply topically every 6 hours as needed       methocarbamol (ROBAXIN) 500 MG tablet Take 1 tablet (500 mg) by mouth every 6 hours 20 tablet 0     naloxone (NARCAN) 4 MG/0.1ML nasal spray Spray 1 spray (4 mg) into one nostril alternating nostrils as needed for opioid reversal every 2-3 minutes until assistance arrives (Patient not taking: Reported on 1/9/2023) 0.2 mL 0     nystatin (MYCOSTATIN) 903663 UNIT/ML suspension Swish and spit 5 mLs (500,000 Units) in mouth 4 times daily 473 mL 1     ondansetron (ZOFRAN) 4 MG tablet Take 1-2 tablets (4-8 mg) by mouth every 8 hours as needed for nausea 60 tablet 3     oxyCODONE (ROXICODONE) 5 MG tablet Take 1 tablet (5 mg) by mouth every 4 hours as needed for moderate to severe pain 60 tablet 0     [START ON 3/6/2023] pazopanib (VOTRIENT) 200 MG tablet Take 4 tablets (800 mg) by mouth daily Take on an empty stomach 1 hour before or 2 hours after a meal. 120 " tablet 11     pazopanib (VOTRIENT) 200 MG tablet Take 4 tablets (800 mg) by mouth daily Take on an empty stomach 1 hour before or 2 hours after a meal. 56 tablet 0     pazopanib (VOTRIENT) 200 MG tablet Take 4 tablets (800 mg) by mouth daily Take on an empty stomach 1 hour before or 2 hours after a meal. 120 tablet 0     phosphorus tablet 250 mg (PHOSPHA 250 NEUTRAL) 250 MG per tablet Take 1 tablet (250 mg) by mouth daily       potassium chloride ER (KLOR-CON M) 20 MEQ CR tablet Take 1 tablet (20 mEq) by mouth daily 90 tablet 3     prochlorperazine (COMPAZINE) 5 MG tablet Take 1 tablet (5 mg) by mouth every 6 hours as needed for nausea or vomiting . Caution: causes sedation. 30 tablet 1     senna-docusate (SENOKOT-S/PERICOLACE) 8.6-50 MG tablet Take 1 tablet by mouth 2 times daily as needed for constipation (Patient not taking: Reported on 1/9/2023) 30 tablet 1     SUMAtriptan (IMITREX) 50 MG tablet Take 1 tablet (50 mg) by mouth at onset of headache for migraine May repeat in 2 hours. Max 4 tablets/24 hours. 10 tablet 3     triamcinolone (KENALOG) 0.1 % external cream Apply topically 2 times daily as needed to bothersome skin areas. 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     Thyroid disease     removed pituitary gland     Past Surgical History:   Procedure Laterality Date     COLONOSCOPY N/A 12/17/2020    Procedure: COLONOSCOPY;  Surgeon: Ken Camacho MD;  Location: [facility]     ENDOSCOPIC RETROGRADE CHOLANGIOPANCREATOGRAM N/A 12/23/2022    Procedure: ENDOSCOPIC RETROGRADE CHOLANGIOPANCREATOGRAPHY;  Surgeon: Jim Lamar MD;  Location: [facility] OR     ENT SURGERY       ESOPHAGOSCOPY, GASTROSCOPY, DUODENOSCOPY (EGD), COMBINED N/A 12/27/2022    Procedure: ESOPHAGOGASTRODUODENOSCOPY (EGD);  Surgeon: Brenton Francois MD;  Location:   [facility]     HERNIA REPAIR       INSERT PORT VASCULAR ACCESS Right 1/28/2022    Procedure: INSERTION, VASCULAR ACCESS PORT;  Surgeon: Daniel Kinney MD;  Location: [facility] OR     IR CHEST PORT PLACEMENT > 5 YRS OF AGE  1/28/2022     LAPAROSCOPIC CHOLECYSTECTOMY N/A 12/24/2022    Procedure: CHOLECYSTECTOMY, LAPAROSCOPIC;  Surgeon: Froy More DO;  Location: [facility] OR     LARYNGOSCOPY, EXCISE VOCAL CORD LESION MICROSCOPIC, COMBINED Left 07/01/2021    Procedure: MICROLARYNGOSCOPY, LEFT TRUE VOCAL CORD INJECTION WITH PROLARYN;  Surgeon: Kyree Bearden MD;  Location: [facility] OR     PHACOEMULSIFICATION WITH STANDARD INTRAOCULAR LENS IMPLANT Right 03/10/2021    Procedure: Cataract removal with implant.;  Surgeon: Jamir Mac MD;  Location: [facility] OR     PHACOEMULSIFICATION WITH STANDARD INTRAOCULAR LENS IMPLANT Left 04/05/2021    Procedure: Cataract removal with implant.;  Surgeon: Jamir Mac MD;  Location: [facility] OR     PICC DOUBLE LUMEN PLACEMENT Right 12/01/2021    5FR DL PICC, basilic vein. L-38cm, 1cm out.     PICC DOUBLE LUMEN PLACEMENT Right 01/04/2022    Right cephalic, 41 cm, 1 external length     PITUITARY EXCISION       tooth pulled 4/7  Right      Allergies   Allergen Reactions     Penicillins Hives and Swelling     Occurred as small child   Pt tolerated meropenem 12/23/22       Social History   Social History     Tobacco Use     Smoking status: Never     Smokeless tobacco: Never   Substance Use Topics     Alcohol use: Yes     Comment: rare     Drug use: Never      Past medical history and social history were reviewed.    Physical Examination:  There were no vitals taken for this visit.  Wt Readings from Last 10 Encounters:   01/10/23 63.5 kg (140 lb)   01/05/23 61.5 kg (135 lb 8 oz)   12/27/22 65 kg (143 lb 6.4 oz)   12/26/22 62.1 kg (137 lb)   12/22/22 62.5 kg (137 lb 12.8 oz)   12/22/22 70.3 kg (155 lb)   11/26/22 70.7 kg (155 lb 14.4 oz)   11/23/22 68 kg (150 lb)   10/19/22  67.9 kg (149 lb 12.8 oz)   10/06/22 65 kg (143 lb 3.2 oz)     Video physical exam  General: Patient appears well in no acute distress.   Skin: No visualized rash or lesions on visualized skin  Eyes: EOMI, no erythema, sclera icterus or discharge noted  Resp: Appears to be breathing comfortably without accessory muscle usage, speaking in full sentences, no cough  MSK: Appears to have normal range of motion based on visualized movements  Neurologic: No apparent tremors, facial movements symmetric  Psych: affect understandably depressed, alert and oriented    Laboratory Data:   Latest Reference Range & Units 01/12/23 09:31   Sodium 136 - 145 mmol/L 139   Potassium 3.4 - 5.3 mmol/L 3.6   Chloride 98 - 107 mmol/L 101   Carbon Dioxide (CO2) 22 - 29 mmol/L 28   Urea Nitrogen 8.0 - 23.0 mg/dL 30.4 (H)   Creatinine 0.67 - 1.17 mg/dL 1.70 (H)   GFR Estimate >60 mL/min/1.73m2 42 (L)   Calcium 8.8 - 10.2 mg/dL 9.7   Anion Gap 7 - 15 mmol/L 10   Magnesium 1.7 - 2.3 mg/dL 1.7   Phosphorus 2.5 - 4.5 mg/dL 3.7   Albumin 3.5 - 5.2 g/dL 3.2 (L)   Protein Total 6.4 - 8.3 g/dL 5.7 (L)   Alkaline Phosphatase 40 - 129 U/L 169 (H)   ALT 10 - 50 U/L 16   AST 10 - 50 U/L 19   Bilirubin Total <=1.2 mg/dL 0.3   Glucose 70 - 99 mg/dL 125 (H)   T4 Free 0.90 - 1.70 ng/dL 1.63   WBC 4.0 - 11.0 10e3/uL 8.2   Hemoglobin 13.3 - 17.7 g/dL 7.7 (L)   Hematocrit 40.0 - 53.0 % 24.4 (L)   Platelet Count 150 - 450 10e3/uL 144 (L)   RBC Count 4.40 - 5.90 10e6/uL 2.54 (L)   MCV 78 - 100 fL 96   MCH 26.5 - 33.0 pg 30.3   MCHC 31.5 - 36.5 g/dL 31.6   RDW 10.0 - 15.0 % 16.8 (H)   % Neutrophils % 83   % Lymphocytes % 8   % Monocytes % 6   % Eosinophils % 1   % Basophils % 0   Absolute Basophils 0.0 - 0.2 10e3/uL 0.0   Absolute Eosinophils 0.0 - 0.7 10e3/uL 0.0   Absolute Immature Granulocytes <=0.4 10e3/uL 0.2   Absolute Lymphocytes 0.8 - 5.3 10e3/uL 0.6 (L)   Absolute Monocytes 0.0 - 1.3 10e3/uL 0.5   % Immature Granulocytes % 2   Absolute Neutrophils 1.6 - 8.3  10e3/uL 6.9   Absolute NRBCs 10e3/uL 0.0   NRBCs per 100 WBC <1 /100 0   (H): Data is abnormally high  (L): Data is abnormally low      Assessment and Plan:  #  Metastatic spindle cell sarcoma  S/p progression on multiple lines of therapy.  Has not tolerated prior chemotherapy well. Most recently received Trabectedin x 1 cycle six weeks ago; complicated by HUSSEIN, hepatic toxicity, nausea, anorexia, dehydration, pancytopenia. Now more recently course complicated by acute cholecystitis s/p lap carmelita 12/24, re-admission for sepsis and anemia.    Next line therapy is Pazopanib, has not yet started. Clinically he has continued to decline- weak, not eating, having more pain, nausea. ECOG 2. Concern progressive malignancy is the cause of his ongoing decline. He has had significant toxicities with past chemotherapy regimens.    Had long discussion with patient about goals of care. He has metastatic, terminal cancer that has progressed on five prior therapies. The chance of substantial improvement with Pazopanib is low and I worry about side effects given how poorly he is doing now, and how poorly he has tolerated chemotherapy in the past.    Discussed the option of no further treatment and best supportive cares/hospice. He has been thinking about this as well. He would like to review with his wife over the weekend.    HOLD on starting Pazopanib during ongoing goals of care discussion. Will do another virtual visit 1/23/23.     Will update Dr. Wolff.      #  Chronic/recurrent mucositis  Ongoing throughout all of treatment. No thrush currently. Continue salt/soda and Doxepin PRN.      # Poor Oral Intake  # Dehydration  # Renal Insufficiency   # Hypokalemia  # Hypophosphatemia  Was requiring IVF 1-2 days/week in [place]. Continues to do poorly. Creat continues to increase.    Continue weekly labs and IVF at [place]. Continue potassium and phos supplement. Confirmed he is not on NSAIDs.      # Acute LFT elevation with  hyperbilirubinemia, resolving  # Obstructive cholelithiasis, s/p lap carmelita  He is 3 weeks post lap-carmelita. LFTs continue to improve.     # Chemo-related pancytopenia, resolved  # Acute/chronic anemia  2/2 chemotherapy. Recent hospitalization, noted to have acute anemia 6.4 (baseline 7-8). No bleeding source identified on ERCP, EGD and CT abd. Surgery felt unlikely related to post-op loss following lap carmelita. Stabilized after pRBC. Holding Celebrex/NSAIDs. Could have been related to acute infectious process/reactive with sepsis. Now likely ongoing with progressive malignancy.     Hgb was 7.7 1/12/23 and he received 1 unit pRBC. Continue weekly monitoring and transfuse if hgb <8.       # Superficial Phlebitis, resolved  RUE on US 11/30/22. Continue warm compress, improving. Avoid anticoagulation with thrombocytopenia and no DVT. Symptoms are resolved.      # Cancer related pain  Increased pain left flank area related to progression of the splenic mass. Continues to worsen, concerning for disease progression off treatment. See GOC discussion above.    Continue Oxycodone and increase to 5-10mg every 4 hours PRN. Continue topical lidocaine. Avoid NSAIDs.     Will likely need palliative involvement in the near future.      # Hypopituitarism  # Hypothyroidism  Continues Hydrocortisone and Synthroid-confirmed he is taking. Stress doses hydrocortisone with acute illness. Follows with Endo. TSH WNL.     #   Nausea, dyspepsia  Continue Zofran and Compazine scheduled. Feels this is working and declines any additional antiemetics. Tums PRN.      #   Hoarseness  Likely secondary to mediastinal mass. Status post vocal cord injection in July with ENT     #  Depression/anxiety  Stopped Lexapro due to concerns with QT prolongation with Pazopanib. Since he is not sure he is going to start this, OK to restart Lexapro for now. QT reviewed from recent EKG and WNL.      #  Fevers/Chills  ED visit from 1/10/23 reviewed and no clear cause of  infection. Empirically treated with Levaquin and fevers resolved.    60 minutes spent on the date of the encounter doing chart review, review of test results, interpretation of tests, patient visit and documentation     Maynor Rios PA-C  Department of Hematology and Oncology  [facility]         Again, thank you for allowing me to participate in the care of your patient.        Sincerely,        GERALDO Mullins

## 2023-01-19 NOTE — PROGRESS NOTES
Infusion Nursing Note:  Ifrah Huitron presents today for IVF's.    Patient seen by provider today: No   present during visit today: Not Applicable.    Note: N/A    Intravenous Access:  Implanted Port.    Treatment Conditions:  Results reviewed, labs did NOT meet treatment parameters for PRBC's: Hgb 11.3.    Post Infusion Assessment:  Patient tolerated infusion without incident.  Blood return noted pre and post infusion.  Site patent and intact, free from redness, edema or discomfort.  No evidence of extravasations.  Access discontinued per protocol.     Discharge Plan:   Patient discharged in stable condition accompanied by: self.  Departure Mode: Ambulatory.      Anup Velasco RN

## 2023-01-23 PROBLEM — E83.52 HYPERCALCEMIA: Status: ACTIVE | Noted: 2023-01-01

## 2023-01-23 NOTE — PROGRESS NOTES
Ifrah is a 72 year old who is being evaluated via a billable video visit.      How would you like to obtain your AVS? MyChart  If the video visit is dropped, the invitation should be resent by: Send to e-mail at: [email or web address]  Will anyone else be joining your video visit? Yes, wife Abida.      MELISSA JAFFE VVF    Video-Visit Details    Type of service:  Video Visit   Video Start Time: 2:30pm  Video End Time: 3:10pm    Originating Location (pt. 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 History as follows:  - March 2021 presented with chest pain and back pain, imaging with lung mets and biopsy of mediastinal mass consistent with spindle cell sarcoma with high PD-L1. Baseline imaging lung mets, left sided subdiaphragmatic mass, liver lesions.   - June 2021-October 2021 Pembrolizumab, tolerated well, stopped due to disease progression  - October 2021-February 2022 Doxil + Ifos, not tolerated well, required multiple treatment delays, IVF support, dose reductions, horrible mucositis. Stopped due to tolerance  - March 2022-August 2022 Doxil alone, not tolerated well with ongoing skin and mouth toxicity, requiring delays and dose reductions. Stopped due to disease progression  - August 2022-November 2022 Gemzar alone, not tolerated well with cytopenias and edema, stopped due to disease progression  - November 2022 Trabectedin, only had one cycle. Horrible toxicity with HUSSEIN, hepatic toxicity, cytopenias, required hospital admission. Stopped due to poor tolerance and disease progression  -December 2022 Admission for acute cholecystitis s/p lap carmelita 12/24/22, repeat admission after discharge for sepsis and pain     Plan to start Pazopanib as  next line therapy for sarcoma, however haven't been able to start due to ongoing fatigue, fevers (ED visit 1/10/23 discharged on Levaquin), and poorly controlled pain.     Interval History:  Ifrah is about the same as last week. Continues to be very fatigued and weak. His pain is better controlled with Oxycodone and Lidocaine consistently, however he feels more confused and has harder time thinking straight. Has dropped things at times, though notes this occurred with prior chemo treatments. No headaches. Low energy- was unable to attend a virtual work meeting. Continues to have constipation though notes he isn't eating much. Is taking Senna, still passing gas, no abdominal pain. Taste continues to limit eating and his mouth feels off, though no thrush. He is drinking well, having good urine output. He denies any fevers or chills. Nausea is better since finishing antibiotics. No edema or skin concerns. He did take one dose of Lexapro but made him feel dizzy so stopped. CBD gummies have also caused dizziness.     He and his wife would like him to try the Pazopanib, but want to wait until he is feeling better.     Current Outpatient Medications   Medication Sig Dispense Refill     lidocaine (XYLOCAINE) 5 % external ointment Apply topically 4 times daily as needed for moderate pain (4-6) 240 g 1     acetaminophen (TYLENOL) 325 MG tablet Take 2 tablets (650 mg) by mouth every 6 hours as needed for mild pain or other (and adjunct with moderate or severe pain or per patient request)       aspirin-acetaminophen-caffeine (EXCEDRIN MIGRAINE) 250-250-65 MG tablet Take 1 tablet by mouth daily as needed for headaches       Aspirin-Caffeine (JUAN A BACK & BODY PO) Take 2 tablets by mouth 2 times daily as needed       doxepin (SINEQUAN) 10 MG/ML (HIGH CONC) solution Take 2.5 mLs (25 mg) by mouth 2 times daily as needed (rinse for mucositis) Dilute the 2.5 mL of doxepin to 5 ml total in sterile or distilled water. 118 mL 0      "guaiFENesin-codeine (GUAIFENESIN AC) 100-10 MG/5ML syrup Take 10 mLs by mouth every 4 hours as needed for cough 118 mL 0     hydrocortisone (CORTEF) 10 MG tablet Take 20 mg in the morning and 10 mg in the afternoon 270 tablet 2     Insulin Syringe-Needle U-100 27.5G X 5/8\" 2 ML MISC 1 each daily as needed (with solucortef for adrenal crisis) 10 each 1     levothyroxine (SYNTHROID/LEVOTHROID) 75 MCG tablet Take one tab p.o. 6 days per week and 0.5 tabs one day per week ( total of 6.5 tablets weekly) 90 tablet 2     lisinopril (ZESTRIL) 20 MG tablet Take 1 tablet (20 mg) by mouth daily 30 tablet 0     Menthol-Methyl Salicylate (DALIA MALIK GREASELESS) cream Apply topically every 6 hours as needed       methocarbamol (ROBAXIN) 500 MG tablet Take 1 tablet (500 mg) by mouth every 6 hours 20 tablet 0     naloxone (NARCAN) 4 MG/0.1ML nasal spray Spray 1 spray (4 mg) into one nostril alternating nostrils as needed for opioid reversal every 2-3 minutes until assistance arrives (Patient not taking: Reported on 1/9/2023) 0.2 mL 0     nystatin (MYCOSTATIN) 368944 UNIT/ML suspension Swish and spit 5 mLs (500,000 Units) in mouth 4 times daily 473 mL 1     ondansetron (ZOFRAN) 4 MG tablet Take 1-2 tablets (4-8 mg) by mouth every 8 hours as needed for nausea 60 tablet 3     oxyCODONE (ROXICODONE) 5 MG tablet Take 1-2 tablets (5-10 mg) by mouth every 4 hours as needed for moderate to severe pain 100 tablet 0     [START ON 3/6/2023] pazopanib (VOTRIENT) 200 MG tablet Take 4 tablets (800 mg) by mouth daily Take on an empty stomach 1 hour before or 2 hours after a meal. 120 tablet 11     pazopanib (VOTRIENT) 200 MG tablet Take 4 tablets (800 mg) by mouth daily Take on an empty stomach 1 hour before or 2 hours after a meal. 56 tablet 0     pazopanib (VOTRIENT) 200 MG tablet Take 4 tablets (800 mg) by mouth daily Take on an empty stomach 1 hour before or 2 hours after a meal. 120 tablet 0     phosphorus tablet 250 mg (PHOSPHA 250 NEUTRAL) " 250 MG per tablet Take 1 tablet (250 mg) by mouth daily       potassium chloride ER (KLOR-CON M) 20 MEQ CR tablet Take 1 tablet (20 mEq) by mouth daily 90 tablet 3     prochlorperazine (COMPAZINE) 5 MG tablet Take 1 tablet (5 mg) by mouth every 6 hours as needed for nausea or vomiting . Caution: causes sedation. 30 tablet 1     senna-docusate (SENOKOT-S/PERICOLACE) 8.6-50 MG tablet Take 1 tablet by mouth 2 times daily as needed for constipation (Patient not taking: Reported on 1/9/2023) 30 tablet 1     SUMAtriptan (IMITREX) 50 MG tablet Take 1 tablet (50 mg) by mouth at onset of headache for migraine May repeat in 2 hours. Max 4 tablets/24 hours. 10 tablet 3     triamcinolone (KENALOG) 0.1 % external cream Apply topically 2 times daily as needed to bothersome skin areas. 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     Thyroid disease     removed pituitary gland     Past Surgical History:   Procedure Laterality Date     COLONOSCOPY N/A 12/17/2020    Procedure: COLONOSCOPY;  Surgeon: Ken Camacho MD;  Location: [facility]     ENDOSCOPIC RETROGRADE CHOLANGIOPANCREATOGRAM N/A 12/23/2022    Procedure: ENDOSCOPIC RETROGRADE CHOLANGIOPANCREATOGRAPHY;  Surgeon: Jim Lamar MD;  Location: [facility] OR     ENT SURGERY       ESOPHAGOSCOPY, GASTROSCOPY, DUODENOSCOPY (EGD), COMBINED N/A 12/27/2022    Procedure: ESOPHAGOGASTRODUODENOSCOPY (EGD);  Surgeon: Brenton Francois MD;  Location: [facility] GI     HERNIA REPAIR       INSERT PORT VASCULAR ACCESS Right 1/28/2022    Procedure: INSERTION, VASCULAR ACCESS PORT;  Surgeon: Daniel Kinney MD;  Location: [facility] OR     IR CHEST PORT PLACEMENT > 5 YRS OF AGE  1/28/2022     LAPAROSCOPIC CHOLECYSTECTOMY N/A 12/24/2022    Procedure: CHOLECYSTECTOMY, LAPAROSCOPIC;  Surgeon: Froy More DO;  Location:   [facility] OR     LARYNGOSCOPY, EXCISE VOCAL CORD LESION MICROSCOPIC, COMBINED Left 07/01/2021    Procedure: MICROLARYNGOSCOPY, LEFT TRUE VOCAL CORD INJECTION WITH PROLARYN;  Surgeon: Kyree Bearden MD;  Location: [facility] OR     PHACOEMULSIFICATION WITH STANDARD INTRAOCULAR LENS IMPLANT Right 03/10/2021    Procedure: Cataract removal with implant.;  Surgeon: Jamir Mac MD;  Location: [facility] OR     PHACOEMULSIFICATION WITH STANDARD INTRAOCULAR LENS IMPLANT Left 04/05/2021    Procedure: Cataract removal with implant.;  Surgeon: Jamir Mac MD;  Location: [facility] OR     PICC DOUBLE LUMEN PLACEMENT Right 12/01/2021    5FR DL PICC, basilic vein. L-38cm, 1cm out.     PICC DOUBLE LUMEN PLACEMENT Right 01/04/2022    Right cephalic, 41 cm, 1 external length     PITUITARY EXCISION       tooth pulled 4/7  Right      Allergies   Allergen Reactions     Penicillins Hives and Swelling     Occurred as small child   Pt tolerated meropenem 12/23/22       Social History   Social History     Tobacco Use     Smoking status: Never     Smokeless tobacco: Never   Substance Use Topics     Alcohol use: Yes     Comment: rare     Drug use: Never      Past medical history and social history were reviewed.    Physical Examination:  There were no vitals taken for this visit.  Wt Readings from Last 10 Encounters:   01/19/23 59.5 kg (131 lb 3.2 oz)   01/10/23 63.5 kg (140 lb)   01/05/23 61.5 kg (135 lb 8 oz)   12/27/22 65 kg (143 lb 6.4 oz)   12/26/22 62.1 kg (137 lb)   12/22/22 62.5 kg (137 lb 12.8 oz)   12/22/22 70.3 kg (155 lb)   11/26/22 70.7 kg (155 lb 14.4 oz)   11/23/22 68 kg (150 lb)   10/19/22 67.9 kg (149 lb 12.8 oz)     Video physical exam  General: Patient appears well in no acute distress.   Skin: No visualized rash or lesions on visualized skin  Eyes: EOMI, no erythema, sclera icterus or discharge noted  Resp: Appears to be breathing comfortably without accessory muscle usage, speaking in full sentences, no cough  MSK:  Appears to have normal range of motion based on visualized movements  Neurologic: No apparent tremors, facial movements symmetric  Psych: affect down, alert and oriented    Laboratory Data:   Latest Reference Range & Units 01/19/23 09:47   Sodium 136 - 145 mmol/L 137   Potassium 3.4 - 5.3 mmol/L 3.8   Chloride 98 - 107 mmol/L 97 (L)   Carbon Dioxide (CO2) 22 - 29 mmol/L 31 (H)   Urea Nitrogen 8.0 - 23.0 mg/dL 26.5 (H)   Creatinine 0.67 - 1.17 mg/dL 1.75 (H)   GFR Estimate >60 mL/min/1.73m2 40 (L)   Calcium 8.8 - 10.2 mg/dL 10.7 (H)   Anion Gap 7 - 15 mmol/L 9   Magnesium 1.7 - 2.3 mg/dL 2.2   Phosphorus 2.5 - 4.5 mg/dL 4.0   Albumin 3.5 - 5.2 g/dL 3.6   Protein Total 6.4 - 8.3 g/dL 6.4   Alkaline Phosphatase 40 - 129 U/L 157 (H)   ALT 10 - 50 U/L 20   AST 10 - 50 U/L 27   Bilirubin Total <=1.2 mg/dL 0.3   Glucose 70 - 99 mg/dL 134 (H)   WBC 4.0 - 11.0 10e3/uL 10.9   Hemoglobin 13.3 - 17.7 g/dL 11.3 (L)   Hematocrit 40.0 - 53.0 % 35.2 (L)   Platelet Count 150 - 450 10e3/uL 123 (L)   RBC Count 4.40 - 5.90 10e6/uL 3.63 (L)   MCV 78 - 100 fL 97   MCH 26.5 - 33.0 pg 31.1   MCHC 31.5 - 36.5 g/dL 32.1   RDW 10.0 - 15.0 % 15.6 (H)   % Neutrophils % 75   % Lymphocytes % 13   % Monocytes % 8   % Eosinophils % 1   % Basophils % 0   Absolute Basophils 0.0 - 0.2 10e3/uL 0.0   Absolute Eosinophils 0.0 - 0.7 10e3/uL 0.1   Absolute Immature Granulocytes <=0.4 10e3/uL 0.3   Absolute Lymphocytes 0.8 - 5.3 10e3/uL 1.4   Absolute Monocytes 0.0 - 1.3 10e3/uL 0.9   % Immature Granulocytes % 3   Absolute Neutrophils 1.6 - 8.3 10e3/uL 8.1   Absolute NRBCs 10e3/uL 0.0   NRBCs per 100 WBC <1 /100 0   (L): Data is abnormally low  (H): Data is abnormally high      Assessment and Plan:  #  Metastatic spindle cell sarcoma  S/p progression on multiple lines of therapy.  Has not tolerated prior chemotherapy well. Most recently received Trabectedin x 1 cycle six weeks ago; complicated by HUSSEIN, hepatic toxicity, nausea, anorexia, dehydration,  pancytopenia. Now more recently course complicated by acute cholecystitis s/p lap carmelita 12/24, re-admission for sepsis and anemia.     Next line therapy is Pazopanib, has not yet started. Clinically he has continued to decline- weak, not eating, having more pain, nausea. ECOG 2. Concern progressive malignancy is the cause of his ongoing decline. He has had significant toxicities with past chemotherapy regimens.     Reviewed goals of care discussion. He is not yet ready for hospice. He would like to keep Pazopanib as an option, though he agrees he is not in any shape to start now.    Will get updated CT to assess disease state. Will do w/o contrast given ongoing renal insuffiencey.      Plan to review results later this week. Holding treatment for now.     Will message palliative team about scheduling appointment.      #  Chronic/recurrent mucositis  Ongoing throughout all of treatment. No thrush currently. Continue salt/soda and discussed restarting Doxepin.      # Poor Oral Intake  # Dehydration  # Renal Insufficiency   # Hypokalemia  # Hypophosphatemia  Was requiring IVF 1-2 days/week in [place]. Continues to do poorly. Creat continues to increase. Now concern for possible mass effect on kidneys from tumor vs lisinopril induced. Will evaluate on CT and consider stopping lisinopril pending results.      Continue weekly labs and IVF at [place]. Continue potassium and phos supplement. Confirmed he is not on NSAIDs.      # Acute LFT elevation with hyperbilirubinemia, resolving  # Obstructive cholelithiasis, s/p lap carmelita  He is 3 weeks post lap-carmelita. LFTs continue to improve.     # Chemo-related pancytopenia, resolved  # Acute/chronic anemia  2/2 chemotherapy. Recent hospitalization, noted to have acute anemia 6.4 (baseline 7-8). No bleeding source identified on ERCP, EGD and CT abd. Surgery felt unlikely related to post-op loss following lap carmelita. Stabilized after pRBC. Holding Celebrex/NSAIDs. Could have been  related to acute infectious process/reactive with sepsis. Now likely ongoing with progressive malignancy.      Hgb improved to 11.3. Continue weekly monitoring and transfuse if hgb <8.       # Superficial Phlebitis, resolved  RUE on US 11/30/22. Continue warm compress, improving. Avoid anticoagulation with thrombocytopenia and no DVT. Symptoms are resolved.      # Cancer related pain  Increased pain left flank area related to progression of the splenic mass. Continues to worsen, concerning for disease progression off treatment.      Continue Oxycodone 5-10mg every 4 hours PRN. Continue topical lidocaine. Avoid NSAIDs. Monitor CNS effects, consider brain MRI if persist.     Reaching out to palliative as above.      # Hypopituitarism  # Hypothyroidism  Continues Hydrocortisone and Synthroid-confirmed he is taking. Stress doses hydrocortisone with acute illness. Follows with Endo. TSH WNL.     #   Nausea, dyspepsia  Continue Zofran and Compazine scheduled. Feels this is working and declines any additional antiemetics. Tums PRN.      #   Hoarseness  Likely secondary to mediastinal mass. Status post vocal cord injection in July with ENT     #  Depression/anxiety  Holding off on Lexapro given side effects.     #  Hypercalcemia  Could be contributing to symptoms. Confirmed he is not on supplement. Suspect hypercalcemia of malignancy. Will do Zometa this week as well. Denies any current dental concerns.    40 minutes spent on the date of the encounter doing chart review, review of test results, interpretation of tests and patient visit. Documentation performed after encounter date.      Maynor Rios PA-C  Department of Hematology and Oncology  [facility]

## 2023-01-23 NOTE — LETTER
1/23/2023         RE: Ifrah Huitron  [address] [place] [address]        Dear Colleague,    Thank you for referring your patient, Ifrah Huitron, to the [facility]. Please see a copy of my visit note below.    Ifrah is a 72 year old who is being evaluated via a billable video visit.      How would you like to obtain your AVS? MyChart  If the video visit is dropped, the invitation should be resent by: Send to e-mail at: [email or web address]  Will anyone else be joining your video visit? Yes, wife Abida.      MELISSA JAFFE VVF    Video-Visit Details    Type of service:  Video Visit   Video Start Time: 2:30pm  Video End Time: 3:10pm    Originating Location (pt. 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 History as follows:  - March 2021 presented with chest pain and back pain, imaging with lung mets and biopsy of mediastinal mass consistent with spindle cell sarcoma with high PD-L1. Baseline imaging lung mets, left sided subdiaphragmatic mass, liver lesions.   - June 2021-October 2021 Pembrolizumab, tolerated well, stopped due to disease progression  - October 2021-February 2022 Doxil + Ifos, not tolerated well, required multiple treatment delays, IVF support, dose reductions, horrible mucositis. Stopped due to tolerance  - March 2022-August 2022 Doxil alone, not tolerated well with ongoing skin and mouth toxicity, requiring delays and dose reductions. Stopped due to disease progression  - August 2022-November 2022 Gemzar alone, not tolerated well with cytopenias and edema, stopped due to disease progression  - November 2022 Trabectedin, only had one cycle. Horrible toxicity with HUSSEIN, hepatic toxicity,  cytopenias, required hospital admission. Stopped due to poor tolerance and disease progression  -December 2022 Admission for acute cholecystitis s/p lap carmelita 12/24/22, repeat admission after discharge for sepsis and pain     Plan to start Pazopanib as next line therapy for sarcoma, however haven't been able to start due to ongoing fatigue, fevers (ED visit 1/10/23 discharged on Levaquin), and poorly controlled pain.     Interval History:  Ifrah is about the same as last week. Continues to be very fatigued and weak. His pain is better controlled with Oxycodone and Lidocaine consistently, however he feels more confused and has harder time thinking straight. Has dropped things at times, though notes this occurred with prior chemo treatments. No headaches. Low energy- was unable to attend a virtual work meeting. Continues to have constipation though notes he isn't eating much. Is taking Senna, still passing gas, no abdominal pain. Taste continues to limit eating and his mouth feels off, though no thrush. He is drinking well, having good urine output. He denies any fevers or chills. Nausea is better since finishing antibiotics. No edema or skin concerns. He did take one dose of Lexapro but made him feel dizzy so stopped. CBD gummies have also caused dizziness.     He and his wife would like him to try the Pazopanib, but want to wait until he is feeling better.     Current Outpatient Medications   Medication Sig Dispense Refill     lidocaine (XYLOCAINE) 5 % external ointment Apply topically 4 times daily as needed for moderate pain (4-6) 240 g 1     acetaminophen (TYLENOL) 325 MG tablet Take 2 tablets (650 mg) by mouth every 6 hours as needed for mild pain or other (and adjunct with moderate or severe pain or per patient request)       aspirin-acetaminophen-caffeine (EXCEDRIN MIGRAINE) 250-250-65 MG tablet Take 1 tablet by mouth daily as needed for headaches       Aspirin-Caffeine (JUAN A BACK & BODY PO) Take 2 tablets by  "mouth 2 times daily as needed       doxepin (SINEQUAN) 10 MG/ML (HIGH CONC) solution Take 2.5 mLs (25 mg) by mouth 2 times daily as needed (rinse for mucositis) Dilute the 2.5 mL of doxepin to 5 ml total in sterile or distilled water. 118 mL 0     guaiFENesin-codeine (GUAIFENESIN AC) 100-10 MG/5ML syrup Take 10 mLs by mouth every 4 hours as needed for cough 118 mL 0     hydrocortisone (CORTEF) 10 MG tablet Take 20 mg in the morning and 10 mg in the afternoon 270 tablet 2     Insulin Syringe-Needle U-100 27.5G X 5/8\" 2 ML MISC 1 each daily as needed (with solucortef for adrenal crisis) 10 each 1     levothyroxine (SYNTHROID/LEVOTHROID) 75 MCG tablet Take one tab p.o. 6 days per week and 0.5 tabs one day per week ( total of 6.5 tablets weekly) 90 tablet 2     lisinopril (ZESTRIL) 20 MG tablet Take 1 tablet (20 mg) by mouth daily 30 tablet 0     Menthol-Methyl Salicylate (DALIA MALIK GREASELESS) cream Apply topically every 6 hours as needed       methocarbamol (ROBAXIN) 500 MG tablet Take 1 tablet (500 mg) by mouth every 6 hours 20 tablet 0     naloxone (NARCAN) 4 MG/0.1ML nasal spray Spray 1 spray (4 mg) into one nostril alternating nostrils as needed for opioid reversal every 2-3 minutes until assistance arrives (Patient not taking: Reported on 1/9/2023) 0.2 mL 0     nystatin (MYCOSTATIN) 897485 UNIT/ML suspension Swish and spit 5 mLs (500,000 Units) in mouth 4 times daily 473 mL 1     ondansetron (ZOFRAN) 4 MG tablet Take 1-2 tablets (4-8 mg) by mouth every 8 hours as needed for nausea 60 tablet 3     oxyCODONE (ROXICODONE) 5 MG tablet Take 1-2 tablets (5-10 mg) by mouth every 4 hours as needed for moderate to severe pain 100 tablet 0     [START ON 3/6/2023] pazopanib (VOTRIENT) 200 MG tablet Take 4 tablets (800 mg) by mouth daily Take on an empty stomach 1 hour before or 2 hours after a meal. 120 tablet 11     pazopanib (VOTRIENT) 200 MG tablet Take 4 tablets (800 mg) by mouth daily Take on an empty stomach 1 hour " before or 2 hours after a meal. 56 tablet 0     pazopanib (VOTRIENT) 200 MG tablet Take 4 tablets (800 mg) by mouth daily Take on an empty stomach 1 hour before or 2 hours after a meal. 120 tablet 0     phosphorus tablet 250 mg (PHOSPHA 250 NEUTRAL) 250 MG per tablet Take 1 tablet (250 mg) by mouth daily       potassium chloride ER (KLOR-CON M) 20 MEQ CR tablet Take 1 tablet (20 mEq) by mouth daily 90 tablet 3     prochlorperazine (COMPAZINE) 5 MG tablet Take 1 tablet (5 mg) by mouth every 6 hours as needed for nausea or vomiting . Caution: causes sedation. 30 tablet 1     senna-docusate (SENOKOT-S/PERICOLACE) 8.6-50 MG tablet Take 1 tablet by mouth 2 times daily as needed for constipation (Patient not taking: Reported on 1/9/2023) 30 tablet 1     SUMAtriptan (IMITREX) 50 MG tablet Take 1 tablet (50 mg) by mouth at onset of headache for migraine May repeat in 2 hours. Max 4 tablets/24 hours. 10 tablet 3     triamcinolone (KENALOG) 0.1 % external cream Apply topically 2 times daily as needed to bothersome skin areas. 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     Thyroid disease     removed pituitary gland     Past Surgical History:   Procedure Laterality Date     COLONOSCOPY N/A 12/17/2020    Procedure: COLONOSCOPY;  Surgeon: Ken Camacho MD;  [facility]     ENDOSCOPIC RETROGRADE CHOLANGIOPANCREATOGRAM N/A 12/23/2022    Procedure: ENDOSCOPIC RETROGRADE CHOLANGIOPANCREATOGRAPHY;  Surgeon: Jim Lamar MD;  Location: [facility] OR     ENT SURGERY       ESOPHAGOSCOPY, GASTROSCOPY, DUODENOSCOPY (EGD), COMBINED N/A 12/27/2022    Procedure: ESOPHAGOGASTRODUODENOSCOPY (EGD);  Surgeon: Brenton Francois MD;  Location: [facility] GI     HERNIA REPAIR       INSERT PORT VASCULAR ACCESS Right 1/28/2022    Procedure: INSERTION, VASCULAR ACCESS PORT;   Surgeon: Daniel Kinney MD;  Location: [facility] OR     IR CHEST PORT PLACEMENT > 5 YRS OF AGE  1/28/2022     LAPAROSCOPIC CHOLECYSTECTOMY N/A 12/24/2022    Procedure: CHOLECYSTECTOMY, LAPAROSCOPIC;  Surgeon: Froy More DO;  Location: [facility] OR     LARYNGOSCOPY, EXCISE VOCAL CORD LESION MICROSCOPIC, COMBINED Left 07/01/2021    Procedure: MICROLARYNGOSCOPY, LEFT TRUE VOCAL CORD INJECTION WITH PROLARYN;  Surgeon: Kyree Bearden MD;  Location: [facility] OR     PHACOEMULSIFICATION WITH STANDARD INTRAOCULAR LENS IMPLANT Right 03/10/2021    Procedure: Cataract removal with implant.;  Surgeon: Jamir Mac MD;  Location: [facility] OR     PHACOEMULSIFICATION WITH STANDARD INTRAOCULAR LENS IMPLANT Left 04/05/2021    Procedure: Cataract removal with implant.;  Surgeon: Jamir Mac MD;  Location: [facility] OR     PICC DOUBLE LUMEN PLACEMENT Right 12/01/2021    5FR DL PICC, basilic vein. L-38cm, 1cm out.     PICC DOUBLE LUMEN PLACEMENT Right 01/04/2022    Right cephalic, 41 cm, 1 external length     PITUITARY EXCISION       tooth pulled 4/7  Right      Allergies   Allergen Reactions     Penicillins Hives and Swelling     Occurred as small child   Pt tolerated meropenem 12/23/22       Social History   Social History     Tobacco Use     Smoking status: Never     Smokeless tobacco: Never   Substance Use Topics     Alcohol use: Yes     Comment: rare     Drug use: Never      Past medical history and social history were reviewed.    Physical Examination:  There were no vitals taken for this visit.  Wt Readings from Last 10 Encounters:   01/19/23 59.5 kg (131 lb 3.2 oz)   01/10/23 63.5 kg (140 lb)   01/05/23 61.5 kg (135 lb 8 oz)   12/27/22 65 kg (143 lb 6.4 oz)   12/26/22 62.1 kg (137 lb)   12/22/22 62.5 kg (137 lb 12.8 oz)   12/22/22 70.3 kg (155 lb)   11/26/22 70.7 kg (155 lb 14.4 oz)   11/23/22 68 kg (150 lb)   10/19/22 67.9 kg (149 lb 12.8 oz)     Video physical exam  General: Patient appears well in no  acute distress.   Skin: No visualized rash or lesions on visualized skin  Eyes: EOMI, no erythema, sclera icterus or discharge noted  Resp: Appears to be breathing comfortably without accessory muscle usage, speaking in full sentences, no cough  MSK: Appears to have normal range of motion based on visualized movements  Neurologic: No apparent tremors, facial movements symmetric  Psych: affect down, alert and oriented    Laboratory Data:   Latest Reference Range & Units 01/19/23 09:47   Sodium 136 - 145 mmol/L 137   Potassium 3.4 - 5.3 mmol/L 3.8   Chloride 98 - 107 mmol/L 97 (L)   Carbon Dioxide (CO2) 22 - 29 mmol/L 31 (H)   Urea Nitrogen 8.0 - 23.0 mg/dL 26.5 (H)   Creatinine 0.67 - 1.17 mg/dL 1.75 (H)   GFR Estimate >60 mL/min/1.73m2 40 (L)   Calcium 8.8 - 10.2 mg/dL 10.7 (H)   Anion Gap 7 - 15 mmol/L 9   Magnesium 1.7 - 2.3 mg/dL 2.2   Phosphorus 2.5 - 4.5 mg/dL 4.0   Albumin 3.5 - 5.2 g/dL 3.6   Protein Total 6.4 - 8.3 g/dL 6.4   Alkaline Phosphatase 40 - 129 U/L 157 (H)   ALT 10 - 50 U/L 20   AST 10 - 50 U/L 27   Bilirubin Total <=1.2 mg/dL 0.3   Glucose 70 - 99 mg/dL 134 (H)   WBC 4.0 - 11.0 10e3/uL 10.9   Hemoglobin 13.3 - 17.7 g/dL 11.3 (L)   Hematocrit 40.0 - 53.0 % 35.2 (L)   Platelet Count 150 - 450 10e3/uL 123 (L)   RBC Count 4.40 - 5.90 10e6/uL 3.63 (L)   MCV 78 - 100 fL 97   MCH 26.5 - 33.0 pg 31.1   MCHC 31.5 - 36.5 g/dL 32.1   RDW 10.0 - 15.0 % 15.6 (H)   % Neutrophils % 75   % Lymphocytes % 13   % Monocytes % 8   % Eosinophils % 1   % Basophils % 0   Absolute Basophils 0.0 - 0.2 10e3/uL 0.0   Absolute Eosinophils 0.0 - 0.7 10e3/uL 0.1   Absolute Immature Granulocytes <=0.4 10e3/uL 0.3   Absolute Lymphocytes 0.8 - 5.3 10e3/uL 1.4   Absolute Monocytes 0.0 - 1.3 10e3/uL 0.9   % Immature Granulocytes % 3   Absolute Neutrophils 1.6 - 8.3 10e3/uL 8.1   Absolute NRBCs 10e3/uL 0.0   NRBCs per 100 WBC <1 /100 0   (L): Data is abnormally low  (H): Data is abnormally high      Assessment and Plan:  #   Metastatic spindle cell sarcoma  S/p progression on multiple lines of therapy.  Has not tolerated prior chemotherapy well. Most recently received Trabectedin x 1 cycle six weeks ago; complicated by HUSSEIN, hepatic toxicity, nausea, anorexia, dehydration, pancytopenia. Now more recently course complicated by acute cholecystitis s/p lap carmelita 12/24, re-admission for sepsis and anemia.     Next line therapy is Pazopanib, has not yet started. Clinically he has continued to decline- weak, not eating, having more pain, nausea. ECOG 2. Concern progressive malignancy is the cause of his ongoing decline. He has had significant toxicities with past chemotherapy regimens.     Reviewed goals of care discussion. He is not yet ready for hospice. He would like to keep Pazopanib as an option, though he agrees he is not in any shape to start now.    Will get updated CT to assess disease state. Will do w/o contrast given ongoing renal insuffiencey.      Plan to review results later this week. Holding treatment for now.     Will message palliative team about scheduling appointment.      #  Chronic/recurrent mucositis  Ongoing throughout all of treatment. No thrush currently. Continue salt/soda and discussed restarting Doxepin.      # Poor Oral Intake  # Dehydration  # Renal Insufficiency   # Hypokalemia  # Hypophosphatemia  Was requiring IVF 1-2 days/week in [place]. Continues to do poorly. Creat continues to increase. Now concern for possible mass effect on kidneys from tumor vs lisinopril induced. Will evaluate on CT and consider stopping lisinopril pending results.      Continue weekly labs and IVF at [place]. Continue potassium and phos supplement. Confirmed he is not on NSAIDs.      # Acute LFT elevation with hyperbilirubinemia, resolving  # Obstructive cholelithiasis, s/p lap carmelita  He is 3 weeks post lap-carmelita. LFTs continue to improve.     # Chemo-related pancytopenia, resolved  # Acute/chronic anemia  2/2 chemotherapy. Recent  hospitalization, noted to have acute anemia 6.4 (baseline 7-8). No bleeding source identified on ERCP, EGD and CT abd. Surgery felt unlikely related to post-op loss following lap carmelita. Stabilized after pRBC. Holding Celebrex/NSAIDs. Could have been related to acute infectious process/reactive with sepsis. Now likely ongoing with progressive malignancy.      Hgb improved to 11.3. Continue weekly monitoring and transfuse if hgb <8.       # Superficial Phlebitis, resolved  RUE on US 11/30/22. Continue warm compress, improving. Avoid anticoagulation with thrombocytopenia and no DVT. Symptoms are resolved.      # Cancer related pain  Increased pain left flank area related to progression of the splenic mass. Continues to worsen, concerning for disease progression off treatment.      Continue Oxycodone 5-10mg every 4 hours PRN. Continue topical lidocaine. Avoid NSAIDs. Monitor CNS effects, consider brain MRI if persist.     Reaching out to palliative as above.      # Hypopituitarism  # Hypothyroidism  Continues Hydrocortisone and Synthroid-confirmed he is taking. Stress doses hydrocortisone with acute illness. Follows with Endo. TSH WNL.     #   Nausea, dyspepsia  Continue Zofran and Compazine scheduled. Feels this is working and declines any additional antiemetics. Tums PRN.      #   Hoarseness  Likely secondary to mediastinal mass. Status post vocal cord injection in July with ENT     #  Depression/anxiety  Holding off on Lexapro given side effects.     #  Hypercalcemia  Could be contributing to symptoms. Confirmed he is not on supplement. Suspect hypercalcemia of malignancy. Will do Zometa this week as well. Denies any current dental concerns.    40 minutes spent on the date of the encounter doing chart review, review of test results, interpretation of tests and patient visit. Documentation performed after encounter date.      Maynor Rios PA-C  Department of Hematology and Oncology  [facility]  Physicians         Again, thank you for allowing me to participate in the care of your patient.        Sincerely,        GERALDO Mullins

## 2023-01-26 NOTE — PROGRESS NOTES
Ifrah is a 72 year old who is being evaluated via a billable video visit.  Currently in [place].    How would you like to obtain your AVS? MyChart  If the video visit is dropped, the invitation should be resent by: Send to e-mail at: [email or web address]  Will anyone else be joining your video visit? JENNIFER Wilburn      Video-Visit Details    Type of service:  Video Visit   Video Start Time: 3:45pm  Video End Time: 4:10pm    Originating Location (pt. 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 History as follows:  - March 2021 presented with chest pain and back pain, imaging with lung mets and biopsy of mediastinal mass consistent with spindle cell sarcoma with high PD-L1. Baseline imaging lung mets, left sided subdiaphragmatic mass, liver lesions.   - June 2021-October 2021 Pembrolizumab, tolerated well, stopped due to disease progression  - October 2021-February 2022 Doxil + Ifos, not tolerated well, required multiple treatment delays, IVF support, dose reductions, horrible mucositis. Stopped due to tolerance  - March 2022-August 2022 Doxil alone, not tolerated well with ongoing skin and mouth toxicity, requiring delays and dose reductions. Stopped due to disease progression  - August 2022-November 2022 Gemzar alone, not tolerated well with cytopenias and edema, stopped due to disease progression  - November 2022 Trabectedin, only had one cycle. Horrible toxicity with HUSSEIN, hepatic toxicity, cytopenias, required hospital admission. Stopped due to poor tolerance and disease progression  -December 2022 Admission for acute cholecystitis s/p lap carmelita 12/24/22, repeat admission after discharge for sepsis and pain     Plan to start  "Pazopanib as next line therapy for sarcoma, however haven't been able to start due to ongoing fatigue, fevers (ED visit 1/10/23 discharged on Levaquin), and poorly controlled pain.    Interval History:  Ifrah is finally feeling a little better. He has a bit more energy, the confusion and dropping things has resolved. Pain is stable with oxycodone and lidocaine. Taste continues to be an issue, actually feels Doxepin made it worse. No GI concerns. Breathing well. No edema. Still weak but feels better compared to earlier in the week. No fevers.     Current Outpatient Medications   Medication Sig Dispense Refill     acetaminophen (TYLENOL) 325 MG tablet Take 2 tablets (650 mg) by mouth every 6 hours as needed for mild pain or other (and adjunct with moderate or severe pain or per patient request)       aspirin-acetaminophen-caffeine (EXCEDRIN MIGRAINE) 250-250-65 MG tablet Take 1 tablet by mouth daily as needed for headaches       Aspirin-Caffeine (JUAN A BACK & BODY PO) Take 2 tablets by mouth 2 times daily as needed       doxepin (SINEQUAN) 10 MG/ML (HIGH CONC) solution Take 2.5 mLs (25 mg) by mouth 2 times daily as needed (rinse for mucositis) Dilute the 2.5 mL of doxepin to 5 ml total in sterile or distilled water. 118 mL 0     guaiFENesin-codeine (GUAIFENESIN AC) 100-10 MG/5ML syrup Take 10 mLs by mouth every 4 hours as needed for cough 118 mL 0     hydrocortisone (CORTEF) 10 MG tablet Take 20 mg in the morning and 10 mg in the afternoon 270 tablet 2     Insulin Syringe-Needle U-100 27.5G X 5/8\" 2 ML MISC 1 each daily as needed (with solucortef for adrenal crisis) 10 each 1     levothyroxine (SYNTHROID/LEVOTHROID) 75 MCG tablet Take one tab p.o. 6 days per week and 0.5 tabs one day per week ( total of 6.5 tablets weekly) 90 tablet 2     lidocaine (XYLOCAINE) 5 % external ointment Apply topically 4 times daily as needed for moderate pain (4-6) 240 g 1     lisinopril (ZESTRIL) 20 MG tablet Take 1 tablet (20 mg) by mouth " daily 30 tablet 0     Menthol-Methyl Salicylate (DALIA MALIK GREASELESS) cream Apply topically every 6 hours as needed       methocarbamol (ROBAXIN) 500 MG tablet Take 1 tablet (500 mg) by mouth every 6 hours 20 tablet 0     naloxone (NARCAN) 4 MG/0.1ML nasal spray Spray 1 spray (4 mg) into one nostril alternating nostrils as needed for opioid reversal every 2-3 minutes until assistance arrives (Patient not taking: Reported on 1/9/2023) 0.2 mL 0     nystatin (MYCOSTATIN) 891802 UNIT/ML suspension Swish and spit 5 mLs (500,000 Units) in mouth 4 times daily 473 mL 1     ondansetron (ZOFRAN) 4 MG tablet Take 1-2 tablets (4-8 mg) by mouth every 8 hours as needed for nausea 60 tablet 3     oxyCODONE (ROXICODONE) 5 MG tablet Take 1-2 tablets (5-10 mg) by mouth every 4 hours as needed for moderate to severe pain 100 tablet 0     [START ON 3/6/2023] pazopanib (VOTRIENT) 200 MG tablet Take 4 tablets (800 mg) by mouth daily Take on an empty stomach 1 hour before or 2 hours after a meal. 120 tablet 11     pazopanib (VOTRIENT) 200 MG tablet Take 4 tablets (800 mg) by mouth daily Take on an empty stomach 1 hour before or 2 hours after a meal. 56 tablet 0     pazopanib (VOTRIENT) 200 MG tablet Take 4 tablets (800 mg) by mouth daily Take on an empty stomach 1 hour before or 2 hours after a meal. 120 tablet 0     phosphorus tablet 250 mg (PHOSPHA 250 NEUTRAL) 250 MG per tablet Take 1 tablet (250 mg) by mouth daily       potassium chloride ER (KLOR-CON M) 20 MEQ CR tablet Take 1 tablet (20 mEq) by mouth daily 90 tablet 3     prochlorperazine (COMPAZINE) 5 MG tablet Take 1 tablet (5 mg) by mouth every 6 hours as needed for nausea or vomiting . Caution: causes sedation. 30 tablet 1     senna-docusate (SENOKOT-S/PERICOLACE) 8.6-50 MG tablet Take 1 tablet by mouth 2 times daily as needed for constipation (Patient not taking: Reported on 1/9/2023) 30 tablet 1     SUMAtriptan (IMITREX) 50 MG tablet Take 1 tablet (50 mg) by mouth at onset of  headache for migraine May repeat in 2 hours. Max 4 tablets/24 hours. 10 tablet 3     triamcinolone (KENALOG) 0.1 % external cream Apply topically 2 times daily as needed to bothersome skin areas. 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     ENDOSCOPIC RETROGRADE CHOLANGIOPANCREATOGRAM N/A 12/23/2022    Procedure: ENDOSCOPIC RETROGRADE CHOLANGIOPANCREATOGRAPHY;  Surgeon: Jim Lamar MD;  Location: [facility] OR     ENT SURGERY       ESOPHAGOSCOPY, GASTROSCOPY, DUODENOSCOPY (EGD), COMBINED N/A 12/27/2022    Procedure: ESOPHAGOGASTRODUODENOSCOPY (EGD);  Surgeon: Brenton Francois MD;  Location: [facility] GI     HERNIA REPAIR       INSERT PORT VASCULAR ACCESS Right 1/28/2022    Procedure: INSERTION, VASCULAR ACCESS PORT;  Surgeon: Daniel Kinney MD;  Location: [facility] OR     IR CHEST PORT PLACEMENT > 5 YRS OF AGE  1/28/2022     LAPAROSCOPIC CHOLECYSTECTOMY N/A 12/24/2022    Procedure: CHOLECYSTECTOMY, LAPAROSCOPIC;  Surgeon: Froy More DO;  Location: [facility] OR     LARYNGOSCOPY, EXCISE VOCAL CORD LESION MICROSCOPIC, COMBINED Left 07/01/2021    Procedure: MICROLARYNGOSCOPY, LEFT TRUE VOCAL CORD INJECTION WITH PROLARYN;  Surgeon: Kyree Bearden MD;  Location: [facility] OR     PHACOEMULSIFICATION WITH STANDARD INTRAOCULAR LENS IMPLANT Right 03/10/2021    Procedure: Cataract removal with implant.;  Surgeon: Jamir Mac MD;  Location: [facility] OR     PHACOEMULSIFICATION WITH STANDARD INTRAOCULAR LENS IMPLANT Left 04/05/2021    Procedure: Cataract removal with implant.;  Surgeon: Jamir Mac MD;  Location: [facility] OR      PICC DOUBLE LUMEN PLACEMENT Right 12/01/2021    5FR DL PICC, basilic vein. L-38cm, 1cm out.     PICC DOUBLE LUMEN PLACEMENT Right 01/04/2022    Right cephalic, 41 cm, 1 external length     PITUITARY EXCISION       tooth pulled 4/7  Right      Allergies   Allergen Reactions     Penicillins Hives and Swelling     Occurred as small child   Pt tolerated meropenem 12/23/22       Social History   Social History     Tobacco Use     Smoking status: Never     Smokeless tobacco: Never   Substance Use Topics     Alcohol use: Yes     Comment: rare     Drug use: Never      Past medical history and social history were reviewed.    Physical Examination:  There were no vitals taken for this visit.  Wt Readings from Last 10 Encounters:   01/26/23 59.3 kg (130 lb 12.8 oz)   01/19/23 59.5 kg (131 lb 3.2 oz)   01/10/23 63.5 kg (140 lb)   01/05/23 61.5 kg (135 lb 8 oz)   12/27/22 65 kg (143 lb 6.4 oz)   12/26/22 62.1 kg (137 lb)   12/22/22 62.5 kg (137 lb 12.8 oz)   12/22/22 70.3 kg (155 lb)   11/26/22 70.7 kg (155 lb 14.4 oz)   11/23/22 68 kg (150 lb)     Video physical exam  General: Patient appears well in no acute distress.   Skin: No visualized rash or lesions on visualized skin  Eyes: EOMI, no erythema, sclera icterus or discharge noted  Resp: Appears to be breathing comfortably without accessory muscle usage, speaking in full sentences, no cough  MSK: Appears to have normal range of motion based on visualized movements  Neurologic: No apparent tremors, facial movements symmetric  Psych: affect normal, alert and oriented    Laboratory Data:   Latest Reference Range & Units 01/26/23 09:58   Sodium 136 - 145 mmol/L 141   Potassium 3.4 - 5.3 mmol/L 3.6   Chloride 98 - 107 mmol/L 102   Carbon Dioxide (CO2) 22 - 29 mmol/L 29   Urea Nitrogen 8.0 - 23.0 mg/dL 22.4   Creatinine 0.67 - 1.17 mg/dL 1.56 (H)   GFR Estimate >60 mL/min/1.73m2 46 (L)   Calcium 8.8 - 10.2 mg/dL 9.7   Anion Gap 7 - 15 mmol/L 10   Magnesium 1.7 - 2.3 mg/dL 2.1    Phosphorus 2.5 - 4.5 mg/dL 2.9   Albumin 3.5 - 5.2 g/dL 3.4 (L)   Protein Total 6.4 - 8.3 g/dL 6.0 (L)   Alkaline Phosphatase 40 - 129 U/L 173 (H)   ALT 10 - 50 U/L 23   AST 10 - 50 U/L 21   Bilirubin Total <=1.2 mg/dL 0.3   Glucose 70 - 99 mg/dL 106 (H)   WBC 4.0 - 11.0 10e3/uL 9.8   Hemoglobin 13.3 - 17.7 g/dL 11.3 (L)   Hematocrit 40.0 - 53.0 % 35.2 (L)   Platelet Count 150 - 450 10e3/uL 110 (L)   RBC Count 4.40 - 5.90 10e6/uL 3.64 (L)   MCV 78 - 100 fL 97   MCH 26.5 - 33.0 pg 31.0   MCHC 31.5 - 36.5 g/dL 32.1   RDW 10.0 - 15.0 % 15.4 (H)   % Neutrophils % 66   % Lymphocytes % 18   % Monocytes % 10   % Eosinophils % 1   % Basophils % 1   Absolute Basophils 0.0 - 0.2 10e3/uL 0.1   Absolute Eosinophils 0.0 - 0.7 10e3/uL 0.1   Absolute Immature Granulocytes <=0.4 10e3/uL 0.4   Absolute Lymphocytes 0.8 - 5.3 10e3/uL 1.8   Absolute Monocytes 0.0 - 1.3 10e3/uL 1.0   % Immature Granulocytes % 4   Absolute Neutrophils 1.6 - 8.3 10e3/uL 6.5   Absolute NRBCs 10e3/uL 0.0   NRBCs per 100 WBC <1 /100 0   (H): Data is abnormally high  (L): Data is abnormally low    CT CAP  FINDINGS:   LUNGS AND PLEURA: Interval resolution of scattered groundglass nodules described on prior examination. Decrease in size of largest, solid-appearing nodule in the right lower lobe, currently measuring 0.3 cm, previously 0.5 cm (series 5 image 190). There   are are a few additional tiny nodules which persist, such as a 2 mm left upper lobe nodule (series 5 image 160). No definite new nodules or focal airspace consolidation.     MEDIASTINUM/AXILLAE: Minimal increase in size of anterior mediastinal mass, currently measuring 3.8 x 2.8 cm, previously 3.7 x 2.7 cm (series 2 image 69). No new, suspicious lymphadenopathy. Right chest port with tip near the cavoatrial junction.     CORONARY ARTERY CALCIFICATION: Moderate.     HEPATOBILIARY: Expected postprocedural pneumobilia status post ERCP. Stable size of small, well-circumscribed hypodense hepatic  lesions, favor cysts. No new, suspicious liver lesions on this noncontrast CT. Cholecystectomy.     PANCREAS: Normal.     SPLEEN: Continued slight increase in size of lobulated mass involving the spleen, currently measuring 9.4 x 9.1 cm, previously 9.3 x 8.4 cm (series 2 image 135). Mass abuts and may invade the left hemidiaphragm, similar to prior exam.     ADRENAL GLANDS: Normal.     KIDNEYS/BLADDER: No hydronephrosis. Urinary bladder is unremarkable.     BOWEL: No obstruction or inflammatory change. Normal appendix.     LYMPH NODES/PERITONEUM: No suspicious lymphadenopathy. Interval resolution of pneumoperitoneum.     VASCULATURE: Moderate calcified atherosclerosis.     PELVIC ORGANS: Prostatomegaly.     MUSCULOSKELETAL: Stable scattered sclerotic osseous foci without aggressive features. No new, bony destructive lesions.                                                                         IMPRESSION:  1.  Interval resolution of previously described groundglass pulmonary nodules with decrease in size of more solid-appearing pulmonary nodules. These could be infectious/inflammatory, but recommend continued attention on follow-up imaging.  2.  Continued enlargement of left upper quadrant mass involving the spleen with likely invasion of the hemidiaphragm.  3.  Minimal increase in size of anterior mediastinal mass.  4.  Stable small hypodense hepatic lesions, favor cysts.  5.  No evidence of new metastatic disease in the chest, abdomen or pelvis.    Assessment and Plan:  #  Metastatic spindle cell sarcoma  S/p progression on multiple lines of therapy.  Has not tolerated prior chemotherapy well. Most recently received Trabectedin x 1 cycle six weeks ago; complicated by HUSSEIN, hepatic toxicity, nausea, anorexia, dehydration, pancytopenia. Now more recently course complicated by acute cholecystitis s/p lap carmelita 12/24, re-admission for sepsis and anemia.    Plan was to start Pazopanib, however he clinically has not  been doing well (fatigue, weakness, poor appetite, pain). He is doing better today though still not back to baseline.     Reviewed CT scan which suprisingly shows slight enlargement of LUQ mass however the remainder of his disease remains stable. Discussed with Dr. Wolff- given he still is recovering from multiple hospitalizations the last few months and his disease is not changing rapidly will continue to hold on Pazopanib for now.    Will reach out to rad onc for palliative radiation to LUQ mass. Ideally this could be done in [place].      Has palliative follow-up scheduled.      #  Chronic/recurrent mucositis   Ongoing throughout all of treatment. No thrush currently. Continue salt/soda swishes. Hold on Doxepin if not helping.      # Poor Oral Intake  # Dehydration  # Renal Insufficiency   # Hypokalemia  # Hypophosphatemia  Was requiring IVF 1-2 days/week in [place]. Continues to require this, is scheduled weekly. Creat is improving. No kidney involvement of tumor on CT. Possibly lisinopril contributing- will stop Lisinopril and switch to Amlodipine.      Continue weekly labs and IVF at [place]. Continue potassium and phos supplement. Confirmed he is not on NSAIDs.      # Acute LFT elevation with hyperbilirubinemia, resolving  # Obstructive cholelithiasis, s/p lap carmelita  He is 3 weeks post lap-carmelita. LFTs continue to improve.     # Chemo-related pancytopenia, resolved  # Acute/chronic anemia  2/2 chemotherapy. Recent hospitalization, noted to have acute anemia 6.4 (baseline 7-8). No bleeding source identified on ERCP, EGD and CT abd. Surgery felt unlikely related to post-op loss following lap carmelita. Stabilized after pRBC. Holding Celebrex/NSAIDs. Could have been related to acute infectious process/reactive with sepsis. Now likely ongoing with progressive malignancy.      Hgb improved to 11.3. Continue weekly monitoring and transfuse if hgb <8.       # Superficial Phlebitis, resolved  RUE on US 11/30/22.  Continue warm compress, improving. Avoid anticoagulation with thrombocytopenia and no DVT. Symptoms are resolved.      # Cancer related pain  Increased pain left flank area related to progression of the splenic mass. Continue Oxycodone 5-10mg every 4 hours PRN. Continue topical lidocaine. Avoid NSAIDs. Prior CNS concerns resolved.      Seeing palliative. Rad onc referral as above.      # Hypopituitarism  # Hypothyroidism  Continues Hydrocortisone and Synthroid-confirmed he is taking. Stress doses hydrocortisone with acute illness. Follows with Endo. TSH WNL.     #   Nausea, dyspepsia  Continue Zofran and Compazine scheduled. Feels this is working and declines any additional antiemetics. Tums PRN.      #   Hoarseness  Likely secondary to mediastinal mass. Status post vocal cord injection in July with ENT     #  Depression/anxiety  Holding off on Lexapro given side effects. Mood brighter today.      #  Hypercalcemia  Plan had been for Zometa, however calcium improved without intervention. Monitor.     # HTN  Stop lisinopril for possible contribution to elevated creat and switch to Amlodipine 5mg daily. Monitor for edema.    30 minutes spent on the date of the encounter doing chart review, review of test results, interpretation of tests, patient visit and documentation      Maynor Rios PA-C  Department of Hematology and Oncology  [facility]

## 2023-01-26 NOTE — PROGRESS NOTES
Infusion Nursing Note:  Ifrah Huitron presents today for IVF.    Patient seen by provider today: No   present during visit today: Not Applicable.    Note: Pt having back pain, requesting oral tylenol while in infusion. Per Maynor CHOW, OK to give 650mg oral tylenol while in clinic.     Pt does not meet needs for PRBC, platelets, or Zometa today.     Intravenous Access:  Implanted Port.    Treatment Conditions:  Lab Results   Component Value Date     01/26/2023    POTASSIUM 3.6 01/26/2023    MAG 2.1 01/26/2023    CR 1.56 (H) 01/26/2023    COTY 9.7 01/26/2023    BILITOTAL 0.3 01/26/2023    ALBUMIN 3.4 (L) 01/26/2023    ALT 23 01/26/2023    AST 21 01/26/2023     Results reviewed, labs MET treatment parameters, ok to proceed with treatment.    Post Infusion Assessment:  Patient tolerated infusion without incident.  Blood return noted pre and post infusion.  Site patent and intact, free from redness, edema or discomfort.  No evidence of extravasations.  Access discontinued per protocol.     Discharge Plan:   Copy of AVS reviewed with patient and/or family.  Patient will return 2/2/23 for next appointment.  Patient discharged in stable condition accompanied by: self.  Departure Mode: Ambulatory.      Suzette Riley RN

## 2023-01-27 NOTE — LETTER
1/27/2023         RE: Ifrah Huitron  [id number] [place] [address]        Dear Colleague,    Thank you for referring your patient, Ifrah Huitron, to the [facility]. Please see a copy of my visit note below.    Ifrah is a 72 year old who is being evaluated via a billable video visit.  Currently in [place].    How would you like to obtain your AVS? MyChart  If the video visit is dropped, the invitation should be resent by: Send to e-mail at: [email or web address]  Will anyone else be joining your video visit? No     JENNIFER Chacon      Video-Visit Details    Type of service:  Video Visit   Video Start Time: 3:45pm  Video End Time: 4:10pm    Originating Location (pt. 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 History as follows:  - March 2021 presented with chest pain and back pain, imaging with lung mets and biopsy of mediastinal mass consistent with spindle cell sarcoma with high PD-L1. Baseline imaging lung mets, left sided subdiaphragmatic mass, liver lesions.   - June 2021-October 2021 Pembrolizumab, tolerated well, stopped due to disease progression  - October 2021-February 2022 Doxil + Ifos, not tolerated well, required multiple treatment delays, IVF support, dose reductions, horrible mucositis. Stopped due to tolerance  - March 2022-August 2022 Doxil alone, not tolerated well with ongoing skin and mouth toxicity, requiring delays and dose reductions. Stopped due to disease progression  - August 2022-November 2022 Gemzar alone, not tolerated well with cytopenias and edema, stopped due to disease progression  - November 2022 Trabectedin, only had one cycle. Horrible toxicity with HUSSEIN, hepatic  "toxicity, cytopenias, required hospital admission. Stopped due to poor tolerance and disease progression  -December 2022 Admission for acute cholecystitis s/p lap carmelita 12/24/22, repeat admission after discharge for sepsis and pain     Plan to start Pazopanib as next line therapy for sarcoma, however haven't been able to start due to ongoing fatigue, fevers (ED visit 1/10/23 discharged on Levaquin), and poorly controlled pain.    Interval History:  Ifrah is finally feeling a little better. He has a bit more energy, the confusion and dropping things has resolved. Pain is stable with oxycodone and lidocaine. Taste continues to be an issue, actually feels Doxepin made it worse. No GI concerns. Breathing well. No edema. Still weak but feels better compared to earlier in the week. No fevers.     Current Outpatient Medications   Medication Sig Dispense Refill     acetaminophen (TYLENOL) 325 MG tablet Take 2 tablets (650 mg) by mouth every 6 hours as needed for mild pain or other (and adjunct with moderate or severe pain or per patient request)       aspirin-acetaminophen-caffeine (EXCEDRIN MIGRAINE) 250-250-65 MG tablet Take 1 tablet by mouth daily as needed for headaches       Aspirin-Caffeine (JUAN A BACK & BODY PO) Take 2 tablets by mouth 2 times daily as needed       doxepin (SINEQUAN) 10 MG/ML (HIGH CONC) solution Take 2.5 mLs (25 mg) by mouth 2 times daily as needed (rinse for mucositis) Dilute the 2.5 mL of doxepin to 5 ml total in sterile or distilled water. 118 mL 0     guaiFENesin-codeine (GUAIFENESIN AC) 100-10 MG/5ML syrup Take 10 mLs by mouth every 4 hours as needed for cough 118 mL 0     hydrocortisone (CORTEF) 10 MG tablet Take 20 mg in the morning and 10 mg in the afternoon 270 tablet 2     Insulin Syringe-Needle U-100 27.5G X 5/8\" 2 ML MISC 1 each daily as needed (with solucortef for adrenal crisis) 10 each 1     levothyroxine (SYNTHROID/LEVOTHROID) 75 MCG tablet Take one tab p.o. 6 days per week and 0.5 " tabs one day per week ( total of 6.5 tablets weekly) 90 tablet 2     lidocaine (XYLOCAINE) 5 % external ointment Apply topically 4 times daily as needed for moderate pain (4-6) 240 g 1     lisinopril (ZESTRIL) 20 MG tablet Take 1 tablet (20 mg) by mouth daily 30 tablet 0     Menthol-Methyl Salicylate (DALIA MALIK GREASELESS) cream Apply topically every 6 hours as needed       methocarbamol (ROBAXIN) 500 MG tablet Take 1 tablet (500 mg) by mouth every 6 hours 20 tablet 0     naloxone (NARCAN) 4 MG/0.1ML nasal spray Spray 1 spray (4 mg) into one nostril alternating nostrils as needed for opioid reversal every 2-3 minutes until assistance arrives (Patient not taking: Reported on 1/9/2023) 0.2 mL 0     nystatin (MYCOSTATIN) 335111 UNIT/ML suspension Swish and spit 5 mLs (500,000 Units) in mouth 4 times daily 473 mL 1     ondansetron (ZOFRAN) 4 MG tablet Take 1-2 tablets (4-8 mg) by mouth every 8 hours as needed for nausea 60 tablet 3     oxyCODONE (ROXICODONE) 5 MG tablet Take 1-2 tablets (5-10 mg) by mouth every 4 hours as needed for moderate to severe pain 100 tablet 0     [START ON 3/6/2023] pazopanib (VOTRIENT) 200 MG tablet Take 4 tablets (800 mg) by mouth daily Take on an empty stomach 1 hour before or 2 hours after a meal. 120 tablet 11     pazopanib (VOTRIENT) 200 MG tablet Take 4 tablets (800 mg) by mouth daily Take on an empty stomach 1 hour before or 2 hours after a meal. 56 tablet 0     pazopanib (VOTRIENT) 200 MG tablet Take 4 tablets (800 mg) by mouth daily Take on an empty stomach 1 hour before or 2 hours after a meal. 120 tablet 0     phosphorus tablet 250 mg (PHOSPHA 250 NEUTRAL) 250 MG per tablet Take 1 tablet (250 mg) by mouth daily       potassium chloride ER (KLOR-CON M) 20 MEQ CR tablet Take 1 tablet (20 mEq) by mouth daily 90 tablet 3     prochlorperazine (COMPAZINE) 5 MG tablet Take 1 tablet (5 mg) by mouth every 6 hours as needed for nausea or vomiting . Caution: causes sedation. 30 tablet 1      senna-docusate (SENOKOT-S/PERICOLACE) 8.6-50 MG tablet Take 1 tablet by mouth 2 times daily as needed for constipation (Patient not taking: Reported on 1/9/2023) 30 tablet 1     SUMAtriptan (IMITREX) 50 MG tablet Take 1 tablet (50 mg) by mouth at onset of headache for migraine May repeat in 2 hours. Max 4 tablets/24 hours. 10 tablet 3     triamcinolone (KENALOG) 0.1 % external cream Apply topically 2 times daily as needed to bothersome skin areas. 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     Thyroid disease     removed pituitary gland     Past Surgical History:   Procedure Laterality Date     COLONOSCOPY N/A 12/17/2020    Procedure: COLONOSCOPY;  Surgeon: Ken Camacho MD;  Location: [facility]     ENDOSCOPIC RETROGRADE CHOLANGIOPANCREATOGRAM N/A 12/23/2022    Procedure: ENDOSCOPIC RETROGRADE CHOLANGIOPANCREATOGRAPHY;  Surgeon: Jim Lamar MD;  Location: [facility] OR     ENT SURGERY       ESOPHAGOSCOPY, GASTROSCOPY, DUODENOSCOPY (EGD), COMBINED N/A 12/27/2022    Procedure: ESOPHAGOGASTRODUODENOSCOPY (EGD);  Surgeon: Brenton Francois MD;  Location: [facility] GI     HERNIA REPAIR       INSERT PORT VASCULAR ACCESS Right 1/28/2022    Procedure: INSERTION, VASCULAR ACCESS PORT;  Surgeon: Daniel Kinney MD;  Location: [facility] OR     IR CHEST PORT PLACEMENT > 5 YRS OF AGE  1/28/2022     LAPAROSCOPIC CHOLECYSTECTOMY N/A 12/24/2022    Procedure: CHOLECYSTECTOMY, LAPAROSCOPIC;  Surgeon: Froy More DO;  Location: [facility] OR     LARYNGOSCOPY, EXCISE VOCAL CORD LESION MICROSCOPIC, COMBINED Left 07/01/2021    Procedure: MICROLARYNGOSCOPY, LEFT TRUE VOCAL CORD INJECTION WITH PROLARYN;  Surgeon: Kyree Bearden MD;  Location: [facility]     PHACOEMULSIFICATION WITH STANDARD INTRAOCULAR LENS IMPLANT Right 03/10/2021    Procedure: Cataract  removal with implant.;  Surgeon: Jamir Mac MD;  Location: [facility]     PHACOEMULSIFICATION WITH STANDARD INTRAOCULAR LENS IMPLANT Left 04/05/2021    Procedure: Cataract removal with implant.;  Surgeon: Jamir Mac MD;  Location: [facility]     PICC DOUBLE LUMEN PLACEMENT Right 12/01/2021    5FR DL PICC, basilic vein. L-38cm, 1cm out.     PICC DOUBLE LUMEN PLACEMENT Right 01/04/2022    Right cephalic, 41 cm, 1 external length     PITUITARY EXCISION       tooth pulled 4/7  Right      Allergies   Allergen Reactions     Penicillins Hives and Swelling     Occurred as small child   Pt tolerated meropenem 12/23/22       Social History   Social History     Tobacco Use     Smoking status: Never     Smokeless tobacco: Never   Substance Use Topics     Alcohol use: Yes     Comment: rare     Drug use: Never      Past medical history and social history were reviewed.    Physical Examination:  There were no vitals taken for this visit.  Wt Readings from Last 10 Encounters:   01/26/23 59.3 kg (130 lb 12.8 oz)   01/19/23 59.5 kg (131 lb 3.2 oz)   01/10/23 63.5 kg (140 lb)   01/05/23 61.5 kg (135 lb 8 oz)   12/27/22 65 kg (143 lb 6.4 oz)   12/26/22 62.1 kg (137 lb)   12/22/22 62.5 kg (137 lb 12.8 oz)   12/22/22 70.3 kg (155 lb)   11/26/22 70.7 kg (155 lb 14.4 oz)   11/23/22 68 kg (150 lb)     Video physical exam  General: Patient appears well in no acute distress.   Skin: No visualized rash or lesions on visualized skin  Eyes: EOMI, no erythema, sclera icterus or discharge noted  Resp: Appears to be breathing comfortably without accessory muscle usage, speaking in full sentences, no cough  MSK: Appears to have normal range of motion based on visualized movements  Neurologic: No apparent tremors, facial movements symmetric  Psych: affect normal, alert and oriented    Laboratory Data:   Latest Reference Range & Units 01/26/23 09:58   Sodium 136 - 145 mmol/L 141   Potassium 3.4 - 5.3 mmol/L 3.6   Chloride 98 - 107  mmol/L 102   Carbon Dioxide (CO2) 22 - 29 mmol/L 29   Urea Nitrogen 8.0 - 23.0 mg/dL 22.4   Creatinine 0.67 - 1.17 mg/dL 1.56 (H)   GFR Estimate >60 mL/min/1.73m2 46 (L)   Calcium 8.8 - 10.2 mg/dL 9.7   Anion Gap 7 - 15 mmol/L 10   Magnesium 1.7 - 2.3 mg/dL 2.1   Phosphorus 2.5 - 4.5 mg/dL 2.9   Albumin 3.5 - 5.2 g/dL 3.4 (L)   Protein Total 6.4 - 8.3 g/dL 6.0 (L)   Alkaline Phosphatase 40 - 129 U/L 173 (H)   ALT 10 - 50 U/L 23   AST 10 - 50 U/L 21   Bilirubin Total <=1.2 mg/dL 0.3   Glucose 70 - 99 mg/dL 106 (H)   WBC 4.0 - 11.0 10e3/uL 9.8   Hemoglobin 13.3 - 17.7 g/dL 11.3 (L)   Hematocrit 40.0 - 53.0 % 35.2 (L)   Platelet Count 150 - 450 10e3/uL 110 (L)   RBC Count 4.40 - 5.90 10e6/uL 3.64 (L)   MCV 78 - 100 fL 97   MCH 26.5 - 33.0 pg 31.0   MCHC 31.5 - 36.5 g/dL 32.1   RDW 10.0 - 15.0 % 15.4 (H)   % Neutrophils % 66   % Lymphocytes % 18   % Monocytes % 10   % Eosinophils % 1   % Basophils % 1   Absolute Basophils 0.0 - 0.2 10e3/uL 0.1   Absolute Eosinophils 0.0 - 0.7 10e3/uL 0.1   Absolute Immature Granulocytes <=0.4 10e3/uL 0.4   Absolute Lymphocytes 0.8 - 5.3 10e3/uL 1.8   Absolute Monocytes 0.0 - 1.3 10e3/uL 1.0   % Immature Granulocytes % 4   Absolute Neutrophils 1.6 - 8.3 10e3/uL 6.5   Absolute NRBCs 10e3/uL 0.0   NRBCs per 100 WBC <1 /100 0   (H): Data is abnormally high  (L): Data is abnormally low    CT CAP  FINDINGS:   LUNGS AND PLEURA: Interval resolution of scattered groundglass nodules described on prior examination. Decrease in size of largest, solid-appearing nodule in the right lower lobe, currently measuring 0.3 cm, previously 0.5 cm (series 5 image 190). There   are are a few additional tiny nodules which persist, such as a 2 mm left upper lobe nodule (series 5 image 160). No definite new nodules or focal airspace consolidation.     MEDIASTINUM/AXILLAE: Minimal increase in size of anterior mediastinal mass, currently measuring 3.8 x 2.8 cm, previously 3.7 x 2.7 cm (series 2 image 69). No new,  suspicious lymphadenopathy. Right chest port with tip near the cavoatrial junction.     CORONARY ARTERY CALCIFICATION: Moderate.     HEPATOBILIARY: Expected postprocedural pneumobilia status post ERCP. Stable size of small, well-circumscribed hypodense hepatic lesions, favor cysts. No new, suspicious liver lesions on this noncontrast CT. Cholecystectomy.     PANCREAS: Normal.     SPLEEN: Continued slight increase in size of lobulated mass involving the spleen, currently measuring 9.4 x 9.1 cm, previously 9.3 x 8.4 cm (series 2 image 135). Mass abuts and may invade the left hemidiaphragm, similar to prior exam.     ADRENAL GLANDS: Normal.     KIDNEYS/BLADDER: No hydronephrosis. Urinary bladder is unremarkable.     BOWEL: No obstruction or inflammatory change. Normal appendix.     LYMPH NODES/PERITONEUM: No suspicious lymphadenopathy. Interval resolution of pneumoperitoneum.     VASCULATURE: Moderate calcified atherosclerosis.     PELVIC ORGANS: Prostatomegaly.     MUSCULOSKELETAL: Stable scattered sclerotic osseous foci without aggressive features. No new, bony destructive lesions.                                                                         IMPRESSION:  1.  Interval resolution of previously described groundglass pulmonary nodules with decrease in size of more solid-appearing pulmonary nodules. These could be infectious/inflammatory, but recommend continued attention on follow-up imaging.  2.  Continued enlargement of left upper quadrant mass involving the spleen with likely invasion of the hemidiaphragm.  3.  Minimal increase in size of anterior mediastinal mass.  4.  Stable small hypodense hepatic lesions, favor cysts.  5.  No evidence of new metastatic disease in the chest, abdomen or pelvis.    Assessment and Plan:  #  Metastatic spindle cell sarcoma  S/p progression on multiple lines of therapy.  Has not tolerated prior chemotherapy well. Most recently received Trabectedin x 1 cycle six weeks ago;  complicated by HUSSEIN, hepatic toxicity, nausea, anorexia, dehydration, pancytopenia. Now more recently course complicated by acute cholecystitis s/p lap carmelita 12/24, re-admission for sepsis and anemia.    Plan was to start Pazopanib, however he clinically has not been doing well (fatigue, weakness, poor appetite, pain). He is doing better today though still not back to baseline.     Reviewed CT scan which suprisingly shows slight enlargement of LUQ mass however the remainder of his disease remains stable. Discussed with Dr. Wolff- given he still is recovering from multiple hospitalizations the last few months and his disease is not changing rapidly will continue to hold on Pazopanib for now.    Will reach out to rad onc for palliative radiation to LUQ mass. Ideally this could be done in [place].      Has palliative follow-up scheduled.      #  Chronic/recurrent mucositis   Ongoing throughout all of treatment. No thrush currently. Continue salt/soda swishes. Hold on Doxepin if not helping.      # Poor Oral Intake  # Dehydration  # Renal Insufficiency   # Hypokalemia  # Hypophosphatemia  Was requiring IVF 1-2 days/week in [place]. Continues to require this, is scheduled weekly. Creat is improving. No kidney involvement of tumor on CT. Possibly lisinopril contributing- will stop Lisinopril and switch to Amlodipine.      Continue weekly labs and IVF at [place]. Continue potassium and phos supplement. Confirmed he is not on NSAIDs.      # Acute LFT elevation with hyperbilirubinemia, resolving  # Obstructive cholelithiasis, s/p lap carmelita  He is 3 weeks post lap-carmelita. LFTs continue to improve.     # Chemo-related pancytopenia, resolved  # Acute/chronic anemia  2/2 chemotherapy. Recent hospitalization, noted to have acute anemia 6.4 (baseline 7-8). No bleeding source identified on ERCP, EGD and CT abd. Surgery felt unlikely related to post-op loss following lap carmelita. Stabilized after pRBC. Holding Celebrex/NSAIDs. Could  have been related to acute infectious process/reactive with sepsis. Now likely ongoing with progressive malignancy.      Hgb improved to 11.3. Continue weekly monitoring and transfuse if hgb <8.       # Superficial Phlebitis, resolved  RUE on US 11/30/22. Continue warm compress, improving. Avoid anticoagulation with thrombocytopenia and no DVT. Symptoms are resolved.      # Cancer related pain  Increased pain left flank area related to progression of the splenic mass. Continue Oxycodone 5-10mg every 4 hours PRN. Continue topical lidocaine. Avoid NSAIDs. Prior CNS concerns resolved.      Seeing palliative. Rad onc referral as above.      # Hypopituitarism  # Hypothyroidism  Continues Hydrocortisone and Synthroid-confirmed he is taking. Stress doses hydrocortisone with acute illness. Follows with Endo. TSH WNL.     #   Nausea, dyspepsia  Continue Zofran and Compazine scheduled. Feels this is working and declines any additional antiemetics. Tums PRN.      #   Hoarseness  Likely secondary to mediastinal mass. Status post vocal cord injection in July with ENT     #  Depression/anxiety  Holding off on Lexapro given side effects. Mood brighter today.      #  Hypercalcemia  Plan had been for Zometa, however calcium improved without intervention. Monitor.     # HTN  Stop lisinopril for possible contribution to elevated creat and switch to Amlodipine 5mg daily. Monitor for edema.    30 minutes spent on the date of the encounter doing chart review, review of test results, interpretation of tests, patient visit and documentation      Maynor Rios PA-C  Department of Hematology and Oncology  [facility]         Again, thank you for allowing me to participate in the care of your patient.        Sincerely,        GERALDO Mullins

## 2023-01-27 NOTE — LETTER
1/27/2023         RE: Ifrah Huitron  [address] [place] [address]        Dear Colleague,    Thank you for referring your patient, Ifrah Huitron, to the [facility]. Please see a copy of my visit note below.    Ifrah is a 72 year old who is being evaluated via a billable video visit.  Currently in [place].    How would you like to obtain your AVS? MyChart  If the video visit is dropped, the invitation should be resent by: Send to e-mail at: [email or web address]  Will anyone else be joining your video visit? No     JENNIFER Chacon      Video-Visit Details    Type of service:  Video Visit   Video Start Time: 3:45pm  Video End Time: 4:10pm    Originating Location (pt. 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 History as follows:  - March 2021 presented with chest pain and back pain, imaging with lung mets and biopsy of mediastinal mass consistent with spindle cell sarcoma with high PD-L1. Baseline imaging lung mets, left sided subdiaphragmatic mass, liver lesions.   - June 2021-October 2021 Pembrolizumab, tolerated well, stopped due to disease progression  - October 2021-February 2022 Doxil + Ifos, not tolerated well, required multiple treatment delays, IVF support, dose reductions, horrible mucositis. Stopped due to tolerance  - March 2022-August 2022 Doxil alone, not tolerated well with ongoing skin and mouth toxicity, requiring delays and dose reductions. Stopped due to disease progression  - August 2022-November 2022 Gemzar alone, not tolerated well with cytopenias and edema, stopped due to disease progression  - November 2022 Trabectedin, only had one cycle. Horrible toxicity with HUSSEIN, hepatic  "toxicity, cytopenias, required hospital admission. Stopped due to poor tolerance and disease progression  -December 2022 Admission for acute cholecystitis s/p lap carmelita 12/24/22, repeat admission after discharge for sepsis and pain     Plan to start Pazopanib as next line therapy for sarcoma, however haven't been able to start due to ongoing fatigue, fevers (ED visit 1/10/23 discharged on Levaquin), and poorly controlled pain.    Interval History:  Ifrah is finally feeling a little better. He has a bit more energy, the confusion and dropping things has resolved. Pain is stable with oxycodone and lidocaine. Taste continues to be an issue, actually feels Doxepin made it worse. No GI concerns. Breathing well. No edema. Still weak but feels better compared to earlier in the week. No fevers.     Current Outpatient Medications   Medication Sig Dispense Refill     acetaminophen (TYLENOL) 325 MG tablet Take 2 tablets (650 mg) by mouth every 6 hours as needed for mild pain or other (and adjunct with moderate or severe pain or per patient request)       aspirin-acetaminophen-caffeine (EXCEDRIN MIGRAINE) 250-250-65 MG tablet Take 1 tablet by mouth daily as needed for headaches       Aspirin-Caffeine (JUAN A BACK & BODY PO) Take 2 tablets by mouth 2 times daily as needed       doxepin (SINEQUAN) 10 MG/ML (HIGH CONC) solution Take 2.5 mLs (25 mg) by mouth 2 times daily as needed (rinse for mucositis) Dilute the 2.5 mL of doxepin to 5 ml total in sterile or distilled water. 118 mL 0     guaiFENesin-codeine (GUAIFENESIN AC) 100-10 MG/5ML syrup Take 10 mLs by mouth every 4 hours as needed for cough 118 mL 0     hydrocortisone (CORTEF) 10 MG tablet Take 20 mg in the morning and 10 mg in the afternoon 270 tablet 2     Insulin Syringe-Needle U-100 27.5G X 5/8\" 2 ML MISC 1 each daily as needed (with solucortef for adrenal crisis) 10 each 1     levothyroxine (SYNTHROID/LEVOTHROID) 75 MCG tablet Take one tab p.o. 6 days per week and 0.5 " tabs one day per week ( total of 6.5 tablets weekly) 90 tablet 2     lidocaine (XYLOCAINE) 5 % external ointment Apply topically 4 times daily as needed for moderate pain (4-6) 240 g 1     lisinopril (ZESTRIL) 20 MG tablet Take 1 tablet (20 mg) by mouth daily 30 tablet 0     Menthol-Methyl Salicylate (DALIA MALIK GREASELESS) cream Apply topically every 6 hours as needed       methocarbamol (ROBAXIN) 500 MG tablet Take 1 tablet (500 mg) by mouth every 6 hours 20 tablet 0     naloxone (NARCAN) 4 MG/0.1ML nasal spray Spray 1 spray (4 mg) into one nostril alternating nostrils as needed for opioid reversal every 2-3 minutes until assistance arrives (Patient not taking: Reported on 1/9/2023) 0.2 mL 0     nystatin (MYCOSTATIN) 035309 UNIT/ML suspension Swish and spit 5 mLs (500,000 Units) in mouth 4 times daily 473 mL 1     ondansetron (ZOFRAN) 4 MG tablet Take 1-2 tablets (4-8 mg) by mouth every 8 hours as needed for nausea 60 tablet 3     oxyCODONE (ROXICODONE) 5 MG tablet Take 1-2 tablets (5-10 mg) by mouth every 4 hours as needed for moderate to severe pain 100 tablet 0     [START ON 3/6/2023] pazopanib (VOTRIENT) 200 MG tablet Take 4 tablets (800 mg) by mouth daily Take on an empty stomach 1 hour before or 2 hours after a meal. 120 tablet 11     pazopanib (VOTRIENT) 200 MG tablet Take 4 tablets (800 mg) by mouth daily Take on an empty stomach 1 hour before or 2 hours after a meal. 56 tablet 0     pazopanib (VOTRIENT) 200 MG tablet Take 4 tablets (800 mg) by mouth daily Take on an empty stomach 1 hour before or 2 hours after a meal. 120 tablet 0     phosphorus tablet 250 mg (PHOSPHA 250 NEUTRAL) 250 MG per tablet Take 1 tablet (250 mg) by mouth daily       potassium chloride ER (KLOR-CON M) 20 MEQ CR tablet Take 1 tablet (20 mEq) by mouth daily 90 tablet 3     prochlorperazine (COMPAZINE) 5 MG tablet Take 1 tablet (5 mg) by mouth every 6 hours as needed for nausea or vomiting . Caution: causes sedation. 30 tablet 1      senna-docusate (SENOKOT-S/PERICOLACE) 8.6-50 MG tablet Take 1 tablet by mouth 2 times daily as needed for constipation (Patient not taking: Reported on 1/9/2023) 30 tablet 1     SUMAtriptan (IMITREX) 50 MG tablet Take 1 tablet (50 mg) by mouth at onset of headache for migraine May repeat in 2 hours. Max 4 tablets/24 hours. 10 tablet 3     triamcinolone (KENALOG) 0.1 % external cream Apply topically 2 times daily as needed to bothersome skin areas. 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     Thyroid disease     removed pituitary gland     Past Surgical History:   Procedure Laterality Date     COLONOSCOPY N/A 12/17/2020    Procedure: COLONOSCOPY;  Surgeon: Ken Camacho MD;  Location: [facility]     ENDOSCOPIC RETROGRADE CHOLANGIOPANCREATOGRAM N/A 12/23/2022    Procedure: ENDOSCOPIC RETROGRADE CHOLANGIOPANCREATOGRAPHY;  Surgeon: Jim Lamar MD;  Location: [facility] OR     ENT SURGERY       ESOPHAGOSCOPY, GASTROSCOPY, DUODENOSCOPY (EGD), COMBINED N/A 12/27/2022    Procedure: ESOPHAGOGASTRODUODENOSCOPY (EGD);  Surgeon: Brenton Francois MD;  Location: [facility] GI     HERNIA REPAIR       INSERT PORT VASCULAR ACCESS Right 1/28/2022    Procedure: INSERTION, VASCULAR ACCESS PORT;  Surgeon: Daniel Kinney MD;  Location: [facility] OR     IR CHEST PORT PLACEMENT > 5 YRS OF AGE  1/28/2022     LAPAROSCOPIC CHOLECYSTECTOMY N/A 12/24/2022    Procedure: CHOLECYSTECTOMY, LAPAROSCOPIC;  Surgeon: Froy More DO;  Location: [facility] OR     LARYNGOSCOPY, EXCISE VOCAL CORD LESION MICROSCOPIC, COMBINED Left 07/01/2021    Procedure: MICROLARYNGOSCOPY, LEFT TRUE VOCAL CORD INJECTION WITH PROLARYN;  Surgeon: Kyree Bearden MD;  Location: [facility] OR     PHACOEMULSIFICATION WITH STANDARD INTRAOCULAR LENS IMPLANT Right 03/10/2021    Procedure: Cataract  removal with implant.;  Surgeon: Jamir Mac MD;  Location: [facility]     PHACOEMULSIFICATION WITH STANDARD INTRAOCULAR LENS IMPLANT Left 04/05/2021    Procedure: Cataract removal with implant.;  Surgeon: Jamir Mac MD;  Location: [facility] OR     PICC DOUBLE LUMEN PLACEMENT Right 12/01/2021    5FR DL PICC, basilic vein. L-38cm, 1cm out.     PICC DOUBLE LUMEN PLACEMENT Right 01/04/2022    Right cephalic, 41 cm, 1 external length     PITUITARY EXCISION       tooth pulled 4/7  Right      Allergies   Allergen Reactions     Penicillins Hives and Swelling     Occurred as small child   Pt tolerated meropenem 12/23/22       Social History   Social History     Tobacco Use     Smoking status: Never     Smokeless tobacco: Never   Substance Use Topics     Alcohol use: Yes     Comment: rare     Drug use: Never      Past medical history and social history were reviewed.    Physical Examination:  There were no vitals taken for this visit.  Wt Readings from Last 10 Encounters:   01/26/23 59.3 kg (130 lb 12.8 oz)   01/19/23 59.5 kg (131 lb 3.2 oz)   01/10/23 63.5 kg (140 lb)   01/05/23 61.5 kg (135 lb 8 oz)   12/27/22 65 kg (143 lb 6.4 oz)   12/26/22 62.1 kg (137 lb)   12/22/22 62.5 kg (137 lb 12.8 oz)   12/22/22 70.3 kg (155 lb)   11/26/22 70.7 kg (155 lb 14.4 oz)   11/23/22 68 kg (150 lb)     Video physical exam  General: Patient appears well in no acute distress.   Skin: No visualized rash or lesions on visualized skin  Eyes: EOMI, no erythema, sclera icterus or discharge noted  Resp: Appears to be breathing comfortably without accessory muscle usage, speaking in full sentences, no cough  MSK: Appears to have normal range of motion based on visualized movements  Neurologic: No apparent tremors, facial movements symmetric  Psych: affect normal, alert and oriented    Laboratory Data:   Latest Reference Range & Units 01/26/23 09:58   Sodium 136 - 145 mmol/L 141   Potassium 3.4 - 5.3 mmol/L 3.6   Chloride 98 - 107  mmol/L 102   Carbon Dioxide (CO2) 22 - 29 mmol/L 29   Urea Nitrogen 8.0 - 23.0 mg/dL 22.4   Creatinine 0.67 - 1.17 mg/dL 1.56 (H)   GFR Estimate >60 mL/min/1.73m2 46 (L)   Calcium 8.8 - 10.2 mg/dL 9.7   Anion Gap 7 - 15 mmol/L 10   Magnesium 1.7 - 2.3 mg/dL 2.1   Phosphorus 2.5 - 4.5 mg/dL 2.9   Albumin 3.5 - 5.2 g/dL 3.4 (L)   Protein Total 6.4 - 8.3 g/dL 6.0 (L)   Alkaline Phosphatase 40 - 129 U/L 173 (H)   ALT 10 - 50 U/L 23   AST 10 - 50 U/L 21   Bilirubin Total <=1.2 mg/dL 0.3   Glucose 70 - 99 mg/dL 106 (H)   WBC 4.0 - 11.0 10e3/uL 9.8   Hemoglobin 13.3 - 17.7 g/dL 11.3 (L)   Hematocrit 40.0 - 53.0 % 35.2 (L)   Platelet Count 150 - 450 10e3/uL 110 (L)   RBC Count 4.40 - 5.90 10e6/uL 3.64 (L)   MCV 78 - 100 fL 97   MCH 26.5 - 33.0 pg 31.0   MCHC 31.5 - 36.5 g/dL 32.1   RDW 10.0 - 15.0 % 15.4 (H)   % Neutrophils % 66   % Lymphocytes % 18   % Monocytes % 10   % Eosinophils % 1   % Basophils % 1   Absolute Basophils 0.0 - 0.2 10e3/uL 0.1   Absolute Eosinophils 0.0 - 0.7 10e3/uL 0.1   Absolute Immature Granulocytes <=0.4 10e3/uL 0.4   Absolute Lymphocytes 0.8 - 5.3 10e3/uL 1.8   Absolute Monocytes 0.0 - 1.3 10e3/uL 1.0   % Immature Granulocytes % 4   Absolute Neutrophils 1.6 - 8.3 10e3/uL 6.5   Absolute NRBCs 10e3/uL 0.0   NRBCs per 100 WBC <1 /100 0   (H): Data is abnormally high  (L): Data is abnormally low    CT CAP  FINDINGS:   LUNGS AND PLEURA: Interval resolution of scattered groundglass nodules described on prior examination. Decrease in size of largest, solid-appearing nodule in the right lower lobe, currently measuring 0.3 cm, previously 0.5 cm (series 5 image 190). There   are are a few additional tiny nodules which persist, such as a 2 mm left upper lobe nodule (series 5 image 160). No definite new nodules or focal airspace consolidation.     MEDIASTINUM/AXILLAE: Minimal increase in size of anterior mediastinal mass, currently measuring 3.8 x 2.8 cm, previously 3.7 x 2.7 cm (series 2 image 69). No new,  suspicious lymphadenopathy. Right chest port with tip near the cavoatrial junction.     CORONARY ARTERY CALCIFICATION: Moderate.     HEPATOBILIARY: Expected postprocedural pneumobilia status post ERCP. Stable size of small, well-circumscribed hypodense hepatic lesions, favor cysts. No new, suspicious liver lesions on this noncontrast CT. Cholecystectomy.     PANCREAS: Normal.     SPLEEN: Continued slight increase in size of lobulated mass involving the spleen, currently measuring 9.4 x 9.1 cm, previously 9.3 x 8.4 cm (series 2 image 135). Mass abuts and may invade the left hemidiaphragm, similar to prior exam.     ADRENAL GLANDS: Normal.     KIDNEYS/BLADDER: No hydronephrosis. Urinary bladder is unremarkable.     BOWEL: No obstruction or inflammatory change. Normal appendix.     LYMPH NODES/PERITONEUM: No suspicious lymphadenopathy. Interval resolution of pneumoperitoneum.     VASCULATURE: Moderate calcified atherosclerosis.     PELVIC ORGANS: Prostatomegaly.     MUSCULOSKELETAL: Stable scattered sclerotic osseous foci without aggressive features. No new, bony destructive lesions.                                                                         IMPRESSION:  1.  Interval resolution of previously described groundglass pulmonary nodules with decrease in size of more solid-appearing pulmonary nodules. These could be infectious/inflammatory, but recommend continued attention on follow-up imaging.  2.  Continued enlargement of left upper quadrant mass involving the spleen with likely invasion of the hemidiaphragm.  3.  Minimal increase in size of anterior mediastinal mass.  4.  Stable small hypodense hepatic lesions, favor cysts.  5.  No evidence of new metastatic disease in the chest, abdomen or pelvis.    Assessment and Plan:  #  Metastatic spindle cell sarcoma  S/p progression on multiple lines of therapy.  Has not tolerated prior chemotherapy well. Most recently received Trabectedin x 1 cycle six weeks ago;  complicated by HUSSEIN, hepatic toxicity, nausea, anorexia, dehydration, pancytopenia. Now more recently course complicated by acute cholecystitis s/p lap carmelita 12/24, re-admission for sepsis and anemia.    Plan was to start Pazopanib, however he clinically has not been doing well (fatigue, weakness, poor appetite, pain). He is doing better today though still not back to baseline.     Reviewed CT scan which suprisingly shows slight enlargement of LUQ mass however the remainder of his disease remains stable. Discussed with Dr. Wolff- given he still is recovering from multiple hospitalizations the last few months and his disease is not changing rapidly will continue to hold on Pazopanib for now.    Will reach out to rad onc for palliative radiation to LUQ mass. Ideally this could be done in [place].      Has palliative follow-up scheduled.      #  Chronic/recurrent mucositis   Ongoing throughout all of treatment. No thrush currently. Continue salt/soda swishes. Hold on Doxepin if not helping.      # Poor Oral Intake  # Dehydration  # Renal Insufficiency   # Hypokalemia  # Hypophosphatemia  Was requiring IVF 1-2 days/week in [place]. Continues to require this, is scheduled weekly. Creat is improving. No kidney involvement of tumor on CT. Possibly lisinopril contributing- will stop Lisinopril and switch to Amlodipine.      Continue weekly labs and IVF at [place]. Continue potassium and phos supplement. Confirmed he is not on NSAIDs.      # Acute LFT elevation with hyperbilirubinemia, resolving  # Obstructive cholelithiasis, s/p lap carmelita  He is 3 weeks post lap-carmelita. LFTs continue to improve.     # Chemo-related pancytopenia, resolved  # Acute/chronic anemia  2/2 chemotherapy. Recent hospitalization, noted to have acute anemia 6.4 (baseline 7-8). No bleeding source identified on ERCP, EGD and CT abd. Surgery felt unlikely related to post-op loss following lap carmelita. Stabilized after pRBC. Holding Celebrex/NSAIDs. Could  have been related to acute infectious process/reactive with sepsis. Now likely ongoing with progressive malignancy.      Hgb improved to 11.3. Continue weekly monitoring and transfuse if hgb <8.       # Superficial Phlebitis, resolved  RUE on US 11/30/22. Continue warm compress, improving. Avoid anticoagulation with thrombocytopenia and no DVT. Symptoms are resolved.      # Cancer related pain  Increased pain left flank area related to progression of the splenic mass. Continue Oxycodone 5-10mg every 4 hours PRN. Continue topical lidocaine. Avoid NSAIDs. Prior CNS concerns resolved.      Seeing palliative. Rad onc referral as above.      # Hypopituitarism  # Hypothyroidism  Continues Hydrocortisone and Synthroid-confirmed he is taking. Stress doses hydrocortisone with acute illness. Follows with Endo. TSH WNL.     #   Nausea, dyspepsia  Continue Zofran and Compazine scheduled. Feels this is working and declines any additional antiemetics. Tums PRN.      #   Hoarseness  Likely secondary to mediastinal mass. Status post vocal cord injection in July with ENT     #  Depression/anxiety  Holding off on Lexapro given side effects. Mood brighter today.      #  Hypercalcemia  Plan had been for Zometa, however calcium improved without intervention. Monitor.     # HTN  Stop lisinopril for possible contribution to elevated creat and switch to Amlodipine 5mg daily. Monitor for edema.    30 minutes spent on the date of the encounter doing chart review, review of test results, interpretation of tests, patient visit and documentation      Maynor Rios PA-C  Department of Hematology and Oncology  [facility]         Again, thank you for allowing me to participate in the care of your patient.        Sincerely,        GERALDO Mullins

## 2023-01-31 NOTE — LETTER
1/31/2023       RE: Ifrah Huitron  [address] [place] [place] [address]     Dear Colleague,    Thank you for referring your patient, Ifrah Huitron, to the [facility] at [facility]. Please see a copy of my visit note below.      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 Pembro started summer 2021.  8/2021 scan shows response to pembro.  10/2021 progression-->doxil+ifosfamide complicated by mucositis, nausea, thrush, need for dose reduction  12/2021 scans show stable disease; continue doxil/ifos, has needed dose reductions for tolerability  2/2022 scans with slight reduction in tumor size, continue dose reduced doxil/ifos  1/2023 he had several types of systemic therapy in 2022 and progressed/much toxicity, stopped in Nov. Discussing starting pazopanib but held off due to fatigue, fevers, poor qol.      Course complicated by dysphonia, working with SLP summer 2021.Vocal cord dysfunction, follows ENT.  Has surgical hypopituitarism.  Has CKD     Social - Lives with wife Abida; 5 adult kids, 19 grandkids. Ran a golf course.      Care Planning - ACP discussion 6/2021 palliative visit. Discussed hospice; he has a lot of personal familiarity with that. Discussed hospice care and timing of enrollment 1/2023 visit.    History:  History gathered today from: patient, medical chart    I last saw him a year ago.    Hx reviewed and confirmed with him; summarized above.  Plan to start pazopanib--held due to ongoing fatigue/marginal PS.  Plan to see rad onc for palliative RT to large LUQ/diaphragm mass.    He reflects his pain has worsened tremendously although last scan shows 'only' modest tumor growth.  Pain worsened the last couple months and resumed opioid tx ~Nov.  Oxycodone 5 mg tabs: 1 qam and qpm scheduled; often takes a 3rd dose too  "inbetween.  Also using lidocaine tid to qid too  Pain L side/abd; quite widespread area  Pain relief pretty good with all of this.  Has not tried 10 mg at once; knows he can do this but has chosen not to.  Lidocaine clearly helps too--ointment    Constipated: takes prune juice and stool softeners---feels he manages it fine    Energy low; fatigues easily; globally weak  Was on the floor doing a project and could not get off floor on his own the other day  This was scary  \"I don't think I have the energy to exercise though\" or do therapy.  Losing weight still  Appetite low--dysgeusia also.  \"Is this the way the rest of my life is going to be?\"  Never been on orexigens  Did try cannabis edibles at one point--mostly felt sleepy from it and confused and didn't like it.  Was on olanzapine at one point for nausea from chemo  Exhausted, sleeps a lot  As bad as this is; he does feel like he's slowly improving off chemo the last couple months.    Lexapro was held due to starting pazopanib but he never started it.  He does want to start the pazopanib: he has heard it's the last thing to try; but also is not willing to try it now given his low qol off all chemo, and weight loss. We discussed possibility of him not improving enough to do any more chemo; he knows but is hopeful he will improve more; he does want to start oral chemo if he can.    Mood is worse    PE: There were no vitals taken for this visit.   Wt Readings from Last 3 Encounters:   01/26/23 59.3 kg (130 lb 12.8 oz)   01/19/23 59.5 kg (131 lb 3.2 oz)   01/10/23 63.5 kg (140 lb)     Alert NAD  Clear sensorium    Data reviewed:  I reviewed recent labs and imaging, my comments: Cr 1.56. Alb 3.4.   ms last EKG    CT 1/25 images personally reviewed today  IMPRESSION:  1.  Interval resolution of previously described groundglass pulmonary nodules with decrease in size of more solid-appearing pulmonary nodules. These could be infectious/inflammatory, but recommend " "continued attention on follow-up imaging.  2.  Continued enlargement of left upper quadrant mass involving the spleen with likely invasion of the hemidiaphragm.  3.  Minimal increase in size of anterior mediastinal mass.  4.  Stable small hypodense hepatic lesions, favor cysts.  5.  No evidence of new metastatic disease in the chest, abdomen or pelvis.    [facility] database reviewed: y      Impression & Recommendations:  73 yo with metastatic spindle cell sarcoma    Pain:   Suggested he try 1.5 tabs oxyIR 7.5 mg at a time to see if that will improve pain, without sig side effects.  He's managing OIC well with OTCs, prune juice    Let's retry olanzapine for appetite and mood. He did not tolerate cannabinoids previously. 5 mg bid prn. Stop compazine; ok to take zofran.    Mood is worse off lexapro  His QTc is <400ms and I don't think we should make him stop helpful sx meds if he does start pazopanib due to a theoretical interaction risk on the QT; should just check his EKG a couple weeks into starting pazopanib if he starts it. I'd be in favor of resuming lexapro too and told him that in this context. I'd advocate for continuing both of these (if he finds them helpful) if he does initiate pazopanib and checking the EKG in 1-2 weeks and going from there.     Offered psychotherapy (palliative SW); he'll think about it.    Discussed hospice care and timing of enrollment; he's not ready now; hoping he'll be able to receive more anticancer tx and we discussed today it remains unclear if that will be possible. Notably though he does feel like his qol is improving at least modestly right now.  Does not want to discuss prognosis. Feels 'he'll know when it's time for hospice.\" We discussed the differences between hospice and palliative care.     Follow-up 2-3 weeks    75 minutes spent on the date of the encounter doing chart review, history and exam, patient education & counseling, documentation and other activities as noted " above.    Thank you for involving us in the patient's care.     Sal Handy MD / Palliative Medicine / Pam bassett via [facility].

## 2023-01-31 NOTE — PROGRESS NOTES
Ifrah is a 72 year old who is being evaluated via a billable video visit.      How would you like to obtain your AVS? [employer]  If the video visit is dropped, the invitation should be resent by: Send to e-mail at: [email or web address]  Will anyone else be joining your video visit? 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 Pembro started summer 2021.  8/2021 scan shows response to pembro.  10/2021 progression-->doxil+ifosfamide complicated by mucositis, nausea, thrush, need for dose reduction  12/2021 scans show stable disease; continue doxil/ifos, has needed dose reductions for tolerability  2/2022 scans with slight reduction in tumor size, continue dose reduced doxil/ifos  1/2023 he had several types of systemic therapy in 2022 and progressed/much toxicity, stopped in Nov. Discussing starting pazopanib but held off due to fatigue, fevers, poor qol.      Course complicated by dysphonia, working with SLP summer 2021.Vocal cord dysfunction, follows ENT.  Has surgical hypopituitarism.  Has CKD     Social - Lives with wife Abida; 5 adult kids, 19 grandkids. Ran a golf course.      Care Planning - ACP discussion 6/2021 palliative visit. Discussed hospice; he has a lot of personal familiarity with that. Discussed hospice care and timing of enrollment 1/2023 visit.    History:  History gathered today from: patient, medical chart    I last saw him a year ago.    Hx reviewed and confirmed with him; summarized above.  Plan to start pazopanib--held due to ongoing fatigue/marginal PS.  Plan to see rad onc for palliative RT to large LUQ/diaphragm mass.    He reflects his pain has worsened tremendously although last scan shows 'only' modest tumor growth.  Pain worsened the last couple months and resumed opioid tx ~Nov.  Oxycodone 5 mg tabs: 1 qam and qpm scheduled; often takes a 3rd dose too inbetween.  Also  "using lidocaine tid to qid too  Pain L side/abd; quite widespread area  Pain relief pretty good with all of this.  Has not tried 10 mg at once; knows he can do this but has chosen not to.  Lidocaine clearly helps too--ointment    Constipated: takes prune juice and stool softeners---feels he manages it fine    Energy low; fatigues easily; globally weak  Was on the floor doing a project and could not get off floor on his own the other day  This was scary  \"I don't think I have the energy to exercise though\" or do therapy.  Losing weight still  Appetite low--dysgeusia also.  \"Is this the way the rest of my life is going to be?\"  Never been on orexigens  Did try cannabis edibles at one point--mostly felt sleepy from it and confused and didn't like it.  Was on olanzapine at one point for nausea from chemo  Exhausted, sleeps a lot  As bad as this is; he does feel like he's slowly improving off chemo the last couple months.    Lexapro was held due to starting pazopanib but he never started it.  He does want to start the pazopanib: he has heard it's the last thing to try; but also is not willing to try it now given his low qol off all chemo, and weight loss. We discussed possibility of him not improving enough to do any more chemo; he knows but is hopeful he will improve more; he does want to start oral chemo if he can.    Mood is worse    PE: There were no vitals taken for this visit.   Wt Readings from Last 3 Encounters:   01/26/23 59.3 kg (130 lb 12.8 oz)   01/19/23 59.5 kg (131 lb 3.2 oz)   01/10/23 63.5 kg (140 lb)     Alert NAD  Clear sensorium    Data reviewed:  I reviewed recent labs and imaging, my comments: Cr 1.56. Alb 3.4.   ms last EKG    CT 1/25 images personally reviewed today  IMPRESSION:  1.  Interval resolution of previously described groundglass pulmonary nodules with decrease in size of more solid-appearing pulmonary nodules. These could be infectious/inflammatory, but recommend continued " "attention on follow-up imaging.  2.  Continued enlargement of left upper quadrant mass involving the spleen with likely invasion of the hemidiaphragm.  3.  Minimal increase in size of anterior mediastinal mass.  4.  Stable small hypodense hepatic lesions, favor cysts.  5.  No evidence of new metastatic disease in the chest, abdomen or pelvis.    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  He's managing OIC well with OTCs, prune juice    Let's retry olanzapine for appetite and mood. He did not tolerate cannabinoids previously. 5 mg bid prn. Stop compazine; ok to take zofran.    Mood is worse off lexapro  His QTc is <400ms and I don't think we should make him stop helpful sx meds if he does start pazopanib due to a theoretical interaction risk on the QT; should just check his EKG a couple weeks into starting pazopanib if he starts it. I'd be in favor of resuming lexapro too and told him that in this context. I'd advocate for continuing both of these (if he finds them helpful) if he does initiate pazopanib and checking the EKG in 1-2 weeks and going from there.     Offered psychotherapy (palliative SW); he'll think about it.    Discussed hospice care and timing of enrollment; he's not ready now; hoping he'll be able to receive more anticancer tx and we discussed today it remains unclear if that will be possible. Notably though he does feel like his qol is improving at least modestly right now.  Does not want to discuss prognosis. Feels 'he'll know when it's time for hospice.\" We discussed the differences between hospice and palliative care.     Follow-up 2-3 weeks    75 minutes spent on the date of the encounter doing chart review, history and exam, patient education & counseling, documentation and other activities as noted above.    Thank " you for involving us in the patient's care.   Sal Handy MD / Palliative Medicine / Pam bassett via [facility].      Video-Visit Details    Type of service:  Video Visit   Video Start Time: 10:49 AM  Video End Time:11:30 AM  Originating Location (pt. Location): Home    Distant Location (provider location):  Off-site  Platform used for Video Visit: Thomas Robledo

## 2023-01-31 NOTE — NURSING NOTE
Patient states he started taking Amlodipine and stopped Lisinopril. Patient does not have list of medications in front of him and declined medication review.     Matilde Robledo

## 2023-02-02 NOTE — PROGRESS NOTES
Infusion Nursing Note:  Ifrah Huitron presents today for IVF.    Patient seen by provider today: No   present during visit today: Not Applicable.    Note: N/A.    Intravenous Access:  Labs drawn without difficulty.  Implanted Port.    Treatment Conditions:  Lab Results   Component Value Date    HGB 10.2 (L) 02/02/2023    WBC 13.7 (H) 02/02/2023    ANEU 15.5 (H) 12/26/2022    ANEUTAUTO 10.9 (H) 02/02/2023     (L) 02/02/2023      Results reviewed, labs did NOT meet treatment parameters for PRBC: Hgb <8.     Post Infusion Assessment:  Patient tolerated infusion without incident.  Blood return noted pre and post infusion.  Site patent and intact, free from redness, edema or discomfort.  No evidence of extravasations.  Access discontinued per protocol.     Discharge Plan:   Discharge instructions reviewed with: Patient.  Patient discharged in stable condition accompanied by: self.  Departure Mode: Ambulatory.      Sabrina Castro RN

## 2023-02-06 NOTE — NURSING NOTE
"REASON FOR APPOINTMENT   Type of Cancer: metastatic spindle cell sarcoma  Location: mass in spleen causing pain  Date of Symptom Onset: ongoing cancer treatment since 6/2021. Now with progression and pain.    TREATMENT TO-DATE FOR THIS CANCER  Surgery ? no   Chemotherapy ? Many lines of chemo, follows with Dr. Wolff, Maynor Rios, Malorie Castrejon  Other Treatments for this Cancer ? Discussion today for palliative radiation    PERSONAL HISTORY OF CANCER   Previous Cancer ? no   Prior Radiation ? no   Prior Chemotherapy ? For above diagnosisa   Prior Hormonal Therapy ? no     RECENT IMAGING STUDIES  PET in [facility]    REFERRALS NEEDED  None at this time    VITALS  /81   Pulse 78   Resp 18   Wt 63.3 kg (139 lb 9.6 oz)   SpO2 96%   BMI 21.86 kg/m      PACEMAKER/IMPLANTED CARDIAC DEVICE no    PAIN  Current history of pain associated with this visit:   Intensity: 0/10   \"I've had a really good week. I met with Dr. Handy last week and we adjusted a lot of medicines. I'm starting to feel hungry and feel better\"  Current: aching/throbbing  Location: in back LLQ of back, can flare from 0 to 6 quickly for unexplained/unprovoked reasons  Treatment: oxycodone 7.5 mg 2x/day, topical lidocaine PRN    PSYCHOSOCIAL  Marital Status:   Patient lives in [place] with Abida.  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 He has stepped back quite a bit.  Do you feel safe in your home? Yes    REVIEW OF SYSTEMS  Skin: hand pink/red areas on hands, pt reports a certain chemo caused the skin on his hands to peel and slough off and his finger nails to fall out - healing now. Hx of rosacea   Eyes: glasses  Ears/Nose/Throat: negative  Respiratory: Dyspnea on exertion  Cardiovascular: negative  Gastrointestinal: poor appetite, nausea and abdominal pain/L flank/back pain  Genitourinary: negative  Musculoskeletal: muscular weakness and generalized " "aches/pains  Neurologic: negative  Psychiatric: depression stable  Hematologic/Lymphatic/Immunologic: weight loss  Endocrine: negative    Radiation Oncology Patient Teaching    Current Concern: \" I am just starting to feel better, how will the radiation affect me?\"    Person involved with teaching: Patient and Wife Abida  Patient asked Questions: Yes  Patient was cooperative: Yes  Patient was receptive (willing to accept information given): Yes    Education Assessment  Comprehension ability: Medium  Knowledge level: Medium  Factors affecting teaching: None    Education Materials Given  Radiation Therapy and You    Educational Topics Discussed  Side effects, Medications, Activity, Nutrition, Adjustment to illness and When to call MD/RN    Response To Teaching  More review necessary    Do you have an advanced directive or living will? Yes  Are you DNR/DNI? No        "

## 2023-02-06 NOTE — PROGRESS NOTES
Department of Radiation Oncology  Radiation Therapy Center  [facility]  [address], [address]  [place] [address]  [phone]       Consultation Note    Name: Ifrah Huitron MRN: [id number]   : 1950   Date of Service: 2023  Referring: Dr. 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  Evaluate role for palliative radiation therapy.    History of Present Illness   Mr. Huitron is a 72 year old male a diagnosis of metastatic spindle cell sarcoma.    The patient initially presented in 2021 with symptoms of chest pain and back pain.  Imaging demonstrated concern for lung mets and mediastinal mass.  Biopsy obtained demonstrated spindle cell sarcoma.  Initial presentation the patient was noted to have pulmonary metastasis, splenic mass, and hepatic lesions.  Patient has been on several systemic therapies including pembrolizumab, Doxil and frusemide, Doxil alone, Gemzar alone, trabectedin which was discontinued due to toxicity.  Patient has had admission to the hospital due to cytopenia, HUSSEIN, and hepatotoxicity.  He also had a recent admission for acute cholecystitis requiring laparoscopic cholecystectomy in 2022.  Most recent restaging scans on 2023 demonstrated continued enlargement of a splenic mass with likely invasion of the hemidiaphragm.  There was minimal increase in size of anterior mediastinal mass noted prior.  The patient seen medical oncology team we discussed consideration of systemic therapy with pazopanib, which the patient has not yet started.  The patient has noted increasing pain corresponding likely to the enlarging splenic mass.  Referred to our clinic to discuss the potential role for radiation therapy.    Patient reports at least moderate pain levels in the left splenig region.  Currently taking oxycodone twice a day  with some alleviation.  No pleuritic pain.  No pacemaker.  No prior radiation.    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     Thyroid disease     removed pituitary gland       Past Surgical History:   Past Surgical History:   Procedure Laterality Date     COLONOSCOPY N/A 12/17/2020    Procedure: COLONOSCOPY;  Surgeon: Ken Camacho MD;  Location: [facility]     ENDOSCOPIC RETROGRADE CHOLANGIOPANCREATOGRAM N/A 12/23/2022    Procedure: ENDOSCOPIC RETROGRADE CHOLANGIOPANCREATOGRAPHY;  Surgeon: Jim Lamar MD;  Location: [facility]     ENT SURGERY       ESOPHAGOSCOPY, GASTROSCOPY, DUODENOSCOPY (EGD), COMBINED N/A 12/27/2022    Procedure: ESOPHAGOGASTRODUODENOSCOPY (EGD);  Surgeon: Brenton Francois MD;  Location: [facility] GI     HERNIA REPAIR       INSERT PORT VASCULAR ACCESS Right 1/28/2022    Procedure: INSERTION, VASCULAR ACCESS PORT;  Surgeon: Daniel Kinney MD;  [place] [facility] [place]     IR CHEST PORT PLACEMENT > 5 YRS OF AGE  1/28/2022     LAPAROSCOPIC CHOLECYSTECTOMY N/A 12/24/2022    Procedure: CHOLECYSTECTOMY, LAPAROSCOPIC;  Surgeon: Froy More DO;  Location: [facility] OR     LARYNGOSCOPY, EXCISE VOCAL CORD LESION MICROSCOPIC, COMBINED Left 07/01/2021    Procedure: MICROLARYNGOSCOPY, LEFT TRUE VOCAL CORD INJECTION WITH PROLARYN;  Surgeon: Kyree Bearden MD;  Location: [facility]     PHACOEMULSIFICATION WITH STANDARD INTRAOCULAR LENS IMPLANT Right 03/10/2021    Procedure: Cataract removal with implant.;  Surgeon: Jamir Mac MD;  Location: [facility]     PHACOEMULSIFICATION WITH STANDARD INTRAOCULAR LENS IMPLANT Left 04/05/2021    Procedure: Cataract removal with implant.;  Surgeon: Jamir Mac MD;  Location: [facility]     PICC DOUBLE LUMEN PLACEMENT Right 12/01/2021    5FR DL PICC, basilic vein. L-38cm, 1cm out.     PICC DOUBLE LUMEN PLACEMENT Right  "01/04/2022    Right cephalic, 41 cm, 1 external length     PITUITARY EXCISION       tooth pulled 4/7  Right        Chemotherapy History:  Per HPI    Radiation History:  None    Pregnant: Not Applicable  Implanted Cardiac Devices: No    Medications:  Current Outpatient Medications   Medication     acetaminophen (TYLENOL) 325 MG tablet     amLODIPine (NORVASC) 5 MG tablet     aspirin-acetaminophen-caffeine (EXCEDRIN MIGRAINE) 250-250-65 MG tablet     doxepin (SINEQUAN) 10 MG/ML (HIGH CONC) solution     escitalopram (LEXAPRO) 10 MG tablet     guaiFENesin-codeine (GUAIFENESIN AC) 100-10 MG/5ML syrup     hydrocortisone (CORTEF) 10 MG tablet     Insulin Syringe-Needle U-100 27.5G X 5/8\" 2 ML MISC     levothyroxine (SYNTHROID/LEVOTHROID) 75 MCG tablet     lidocaine (XYLOCAINE) 5 % external ointment     OLANZapine (ZYPREXA) 5 MG tablet     ondansetron (ZOFRAN) 4 MG tablet     oxyCODONE (ROXICODONE) 5 MG tablet     [START ON 3/6/2023] pazopanib (VOTRIENT) 200 MG tablet     phosphorus tablet 250 mg (PHOSPHA 250 NEUTRAL) 250 MG per tablet     potassium chloride ER (KLOR-CON M) 20 MEQ CR tablet     prochlorperazine (COMPAZINE) 5 MG tablet     SUMAtriptan (IMITREX) 50 MG tablet     No current facility-administered medications for this visit.         Allergies:     Allergies   Allergen Reactions     Penicillins Hives and Swelling     Occurred as small child   Pt tolerated meropenem 12/23/22         Family History:  Family History   Problem Relation Age of Onset     Lupus Mother      ALS Father      Rheumatoid Arthritis Sister        Review of Systems   A 10-point review of systems was performed. Pertinent findings are noted in the HPI.    Physical Exam   ECOG Status: 1/2    Vitals:  There were no vitals taken for this visit.    Gen: Alert, in NAD  Neck: Full ROM, supple, no palpable adenopathy  Pulm: No wheezing, stridor or respiratory distress  CV: Extremities are warm and well-perfused, no cyanosis, no pedal edema  Abdominal: " Normal bowel sounds, soft, nontender, no masses  Musculoskeletal: Normal bulk and tone  Skin: Normal color and turgor  Neuro: A/Ox3, CN II-XII intact, normal gait  Flank: + TTP in L flank region corresponding to splenic region mass    Imaging/Path/Labs   Imaging:   Per HPI    Path:   Per HPI        Assessment    Mr. 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  Evaluate role for palliative radiation therapy.      Plan   1.  I discussed the natural history of metastatic spindle cell sarcoma.    2.  The patient has progressed to several lines of systemic therapy.  He has a painful splenic mass with invasion of the diaphragm.     3.  I discussed consideration of palliative radiation therapy to the splenic mass with goal of pain response.    4.  I discussed potential side effects in great detail including but not limited to cytopenias, nausea , esophagitis.     5.  Tentatively discussed treatment to 20 Gy in 5 fraction.  Consent obtained.  Simulation will be performed today with plan to begin radiation therapy soon thereafter.    Blayne Adamson MD  Department of Radiation Oncology  [facility]

## 2023-02-06 NOTE — LETTER
2023         RE: Ifrah Huitron  [address] [address] [facility] [address]        Dear Colleague,    Thank you for referring your patient, Ifrah Huitron, to the [facility]. Please see a copy of my visit note below.       Department of Radiation Oncology  Radiation Therapy Center  [facility]  [address], [address]  [place] [address]  [phone]       Consultation Note    Name: Ifrah Huitron MRN: [id number]   : 1950   Date of Service: 2023  Referring: Dr. 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  Evaluate role for palliative radiation therapy.    History of Present Illness   Mr. Huitron is a 72 year old male a diagnosis of metastatic spindle cell sarcoma.    The patient initially presented in 2021 with symptoms of chest pain and back pain.  Imaging demonstrated concern for lung mets and mediastinal mass.  Biopsy obtained demonstrated spindle cell sarcoma.  Initial presentation the patient was noted to have pulmonary metastasis, splenic mass, and hepatic lesions.  Patient has been on several systemic therapies including pembrolizumab, Doxil and frusemide, Doxil alone, Gemzar alone, trabectedin which was discontinued due to toxicity.  Patient has had admission to the hospital due to cytopenia, HUSSEIN, and hepatotoxicity.  He also had a recent admission for acute cholecystitis requiring laparoscopic cholecystectomy in 2022.  Most recent restaging scans on 2023 demonstrated continued enlargement of a splenic mass with likely invasion of the hemidiaphragm.  There was minimal increase in size of anterior mediastinal mass noted prior.  The patient seen medical oncology team we discussed consideration of systemic therapy with pazopanib, which the patient has not yet started.  The patient has noted increasing pain corresponding  likely to the enlarging splenic mass.  Referred to our clinic to discuss the potential role for radiation therapy.    Patient reports at least moderate pain levels in the left splenig region.  Currently taking oxycodone twice a day with some alleviation.  No pleuritic pain.  No pacemaker.  No prior radiation.    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     Thyroid disease     removed pituitary gland       Past Surgical History:   Past Surgical History:   Procedure Laterality Date     COLONOSCOPY N/A 12/17/2020    Procedure: COLONOSCOPY;  Surgeon: Ken Camacho MD;  Location: [facility]     ENDOSCOPIC RETROGRADE CHOLANGIOPANCREATOGRAM N/A 12/23/2022    Procedure: ENDOSCOPIC RETROGRADE CHOLANGIOPANCREATOGRAPHY;  Surgeon: Jim Lamar MD;  Location: [facility] OR     ENT SURGERY       ESOPHAGOSCOPY, GASTROSCOPY, DUODENOSCOPY (EGD), COMBINED N/A 12/27/2022    Procedure: ESOPHAGOGASTRODUODENOSCOPY (EGD);  Surgeon: Brenton Francois MD;  Location: [facility] GI     HERNIA REPAIR       INSERT PORT VASCULAR ACCESS Right 1/28/2022    Procedure: INSERTION, VASCULAR ACCESS PORT;  Surgeon: Daniel Kinney MD;  Location: [facility]     IR CHEST PORT PLACEMENT > 5 YRS OF AGE  1/28/2022     LAPAROSCOPIC CHOLECYSTECTOMY N/A 12/24/2022    Procedure: CHOLECYSTECTOMY, LAPAROSCOPIC;  Surgeon: Froy Moer DO;  Location: [facility] OR     LARYNGOSCOPY, EXCISE VOCAL CORD LESION MICROSCOPIC, COMBINED Left 07/01/2021    Procedure: MICROLARYNGOSCOPY, LEFT TRUE VOCAL CORD INJECTION WITH PROLARYN;  Surgeon: Kyree Bearden MD;  Location: [facility]     PHACOEMULSIFICATION WITH STANDARD INTRAOCULAR LENS IMPLANT Right 03/10/2021    Procedure: Cataract removal with implant.;  Surgeon: Jamir Mac MD;  Location: [facility]     PHACOEMULSIFICATION WITH STANDARD INTRAOCULAR LENS IMPLANT Left 04/05/2021     "Procedure: Cataract removal with implant.;  Surgeon: Jamir Mac MD;  Location: [facility] OR     PICC DOUBLE LUMEN PLACEMENT Right 12/01/2021    5FR DL PICC, basilic vein. L-38cm, 1cm out.     PICC DOUBLE LUMEN PLACEMENT Right 01/04/2022    Right cephalic, 41 cm, 1 external length     PITUITARY EXCISION       tooth pulled 4/7  Right        Chemotherapy History:  Per HPI    Radiation History:  None    Pregnant: Not Applicable  Implanted Cardiac Devices: No    Medications:  Current Outpatient Medications   Medication     acetaminophen (TYLENOL) 325 MG tablet     amLODIPine (NORVASC) 5 MG tablet     aspirin-acetaminophen-caffeine (EXCEDRIN MIGRAINE) 250-250-65 MG tablet     doxepin (SINEQUAN) 10 MG/ML (HIGH CONC) solution     escitalopram (LEXAPRO) 10 MG tablet     guaiFENesin-codeine (GUAIFENESIN AC) 100-10 MG/5ML syrup     hydrocortisone (CORTEF) 10 MG tablet     Insulin Syringe-Needle U-100 27.5G X 5/8\" 2 ML MISC     levothyroxine (SYNTHROID/LEVOTHROID) 75 MCG tablet     lidocaine (XYLOCAINE) 5 % external ointment     OLANZapine (ZYPREXA) 5 MG tablet     ondansetron (ZOFRAN) 4 MG tablet     oxyCODONE (ROXICODONE) 5 MG tablet     [START ON 3/6/2023] pazopanib (VOTRIENT) 200 MG tablet     phosphorus tablet 250 mg (PHOSPHA 250 NEUTRAL) 250 MG per tablet     potassium chloride ER (KLOR-CON M) 20 MEQ CR tablet     prochlorperazine (COMPAZINE) 5 MG tablet     SUMAtriptan (IMITREX) 50 MG tablet     No current facility-administered medications for this visit.         Allergies:     Allergies   Allergen Reactions     Penicillins Hives and Swelling     Occurred as small child   Pt tolerated meropenem 12/23/22         Family History:  Family History   Problem Relation Age of Onset     Lupus Mother      ALS Father      Rheumatoid Arthritis Sister        Review of Systems   A 10-point review of systems was performed. Pertinent findings are noted in the HPI.    Physical Exam   ECOG Status: 1/2    Vitals:  There were no " vitals taken for this visit.    Gen: Alert, in NAD  Neck: Full ROM, supple, no palpable adenopathy  Pulm: No wheezing, stridor or respiratory distress  CV: Extremities are warm and well-perfused, no cyanosis, no pedal edema  Abdominal: Normal bowel sounds, soft, nontender, no masses  Musculoskeletal: Normal bulk and tone  Skin: Normal color and turgor  Neuro: A/Ox3, CN II-XII intact, normal gait  Flank: + TTP in L flank region corresponding to splenic region mass    Imaging/Path/Labs   Imaging:   Per HPI    Path:   Per HPI        Assessment    Mr. 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  Evaluate role for palliative radiation therapy.      Plan   1.  I discussed the natural history of metastatic spindle cell sarcoma.    2.  The patient has progressed to several lines of systemic therapy.  He has a painful splenic mass with invasion of the diaphragm.     3.  I discussed consideration of palliative radiation therapy to the splenic mass with goal of pain response.    4.  I discussed potential side effects in great detail including but not limited to cytopenias, nausea , esophagitis.     5.  Tentatively discussed treatment to 20 Gy in 5 fraction.  Consent obtained.  Simulation will be performed today with plan to begin radiation therapy soon thereafter.    Blayne Adamson MD  Department of Radiation Oncology  [facility]

## 2023-02-08 NOTE — PROGRESS NOTES
PAC labs drawn without difficulty via site protocol. Patient tolerated well.    Suzette Riley RN on 2/8/2023 at 10:06 AM     6

## 2023-02-08 NOTE — PROGRESS NOTES
Infusion Nursing Note:  Ifrah Huitron presents today for IVF.    Patient seen by provider today: No   present during visit today: Not Applicable.    Note: Phosphorus slightly low, per pt he has not picked up his phos prescription yet but is working on getting this figured out.    Intravenous Access:  Implanted Port.    Treatment Conditions:  Lab Results   Component Value Date    HGB 9.8 (L) 02/08/2023    WBC 15.1 (H) 02/08/2023    ANEU 15.5 (H) 12/26/2022    ANEUTAUTO 11.0 (H) 02/08/2023     02/08/2023      Results reviewed, labs did NOT meet treatment parameters: Hgb > 8.0.    Post Infusion Assessment:  Patient tolerated infusion without incident.  Blood return noted pre and post infusion.  Site patent and intact, free from redness, edema or discomfort.  No evidence of extravasations.  Access discontinued per protocol.     Discharge Plan:   Copy of AVS reviewed with patient and/or family.  Patient will return 2/17/23 for next appointment.  Patient discharged in stable condition accompanied by: self.  Departure Mode: Ambulatory.      Suzette Riley RN

## 2023-02-14 NOTE — LETTER
2/14/2023         RE: If[place]uitron  [address] [facility] [address]        Dear Colleague,    Thank you for referring your patient, Ifrah Huitron, to the [facility]. Please see a copy of my visit note below.    Video-Visit Details    Type of service:  Video Visit    Video Start Time (time video started): 1:30pm    Video End Time (time video stopped): 1:48pm    Originating Location (pt. Location): Home        Distant Location (provider location):  On-site    Mode of Communication:  Video Conference via [employer]    Oncology/Hematology Visit Note  Feb 14, 2023    Reason for Visit: Follow up of metastatic spindle cell sarcoma      History of Present Illness: Ifrah Huitron is a 72 year old male with metastatic spindle cell sarcoma. History as follows:  - March 2021 presented with chest pain and back pain, imaging with lung mets and biopsy of mediastinal mass consistent with spindle cell sarcoma with high PD-L1. Baseline imaging lung mets, left sided subdiaphragmatic mass, liver lesions.   - June 2021-October 2021 Pembrolizumab, tolerated well, stopped due to disease progression  - October 2021-February 2022 Doxil + Ifos, not tolerated well, required multiple treatment delays, IVF support, dose reductions, horrible mucositis. Stopped due to tolerance  - March 2022-August 2022 Doxil alone, not tolerated well with ongoing skin and mouth toxicity, requiring delays and dose reductions. Stopped due to disease progression  - August 2022-November 2022 Gemzar alone, not tolerated well with cytopenias and edema, stopped due to disease progression  - November 2022 Trabectedin, only had one cycle. Horrible toxicity with HUSSEIN, hepatic toxicity, cytopenias, required hospital admission. Stopped due to poor tolerance and disease progression  -December 2022 Admission for acute cholecystitis s/p lap carmelita 12/24/22, repeat admission after discharge for sepsis and pain     Plan to start Pazopanib as  next line therapy for sarcoma, however haven't been able to start due to ongoing fatigue, fevers (ED visit 1/10/23 discharged on Levaquin), and poorly controlled pain.    CT 1/25/23 with slight progression of splenic mass, otherwise stable disease. As such opted to do radiation to mass and hold off on chemotherapy for the time.     Interval History:  Ifrah is doing really well. The last week is the best he has felt in a long time. He notes improvement in energy levels and mood. Pain is well controlled with Oxycodone and lidocaine. He is taking Lexapro and Olanzapine, eating and drinking well. Mouth still is very sensitive and sore, though no thrush. Has noted increased lower extremity edema since starting amlodipine, though hasn't been wearing compression stockings. Intermittent cough stable.      Denies fevers, chest pain, SOB, nausea, vomiting, abdominal pain, bowel or bladder concerns, rashes.     He has noted slight flare in pain and fatigue after his first radiation yesterday but better this afternoon.     Current Outpatient Medications   Medication Sig Dispense Refill     acetaminophen (TYLENOL) 325 MG tablet Take 2 tablets (650 mg) by mouth every 6 hours as needed for mild pain or other (and adjunct with moderate or severe pain or per patient request)       amLODIPine (NORVASC) 5 MG tablet Take 1 tablet (5 mg) by mouth daily 30 tablet 0     aspirin-acetaminophen-caffeine (EXCEDRIN MIGRAINE) 250-250-65 MG tablet Take 1 tablet by mouth daily as needed for headaches       doxepin (SINEQUAN) 10 MG/ML (HIGH CONC) solution Take 2.5 mLs (25 mg) by mouth 2 times daily as needed (rinse for mucositis) Dilute the 2.5 mL of doxepin to 5 ml total in sterile or distilled water. 118 mL 0     escitalopram (LEXAPRO) 10 MG tablet Take 1 tablet (10 mg) by mouth daily 30 tablet 0     guaiFENesin-codeine (GUAIFENESIN AC) 100-10 MG/5ML syrup Take 10 mLs by mouth every 4 hours as needed for cough 118 mL 0     hydrocortisone (CORTEF)  "10 MG tablet Take 20 mg in the morning and 10 mg in the afternoon 270 tablet 2     Insulin Syringe-Needle U-100 27.5G X 5/8\" 2 ML MISC 1 each daily as needed (with solucortef for adrenal crisis) 10 each 1     levothyroxine (SYNTHROID/LEVOTHROID) 75 MCG tablet Take one tab p.o. 6 days per week and 0.5 tabs one day per week ( total of 6.5 tablets weekly) 90 tablet 2     lidocaine (XYLOCAINE) 5 % external ointment Apply topically 4 times daily as needed for moderate pain (4-6) 240 g 1     OLANZapine (ZYPREXA) 5 MG tablet Take 1 tablet (5 mg) by mouth 2 times daily as needed 60 tablet 1     ondansetron (ZOFRAN) 4 MG tablet Take 1-2 tablets (4-8 mg) by mouth every 8 hours as needed for nausea 60 tablet 3     oxyCODONE (ROXICODONE) 5 MG tablet Take 1-2 tablets (5-10 mg) by mouth every 4 hours as needed for moderate to severe pain 100 tablet 0     [START ON 3/6/2023] pazopanib (VOTRIENT) 200 MG tablet Take 4 tablets (800 mg) by mouth daily Take on an empty stomach 1 hour before or 2 hours after a meal. (Patient not taking: Reported on 1/27/2023) 120 tablet 11     phosphorus tablet 250 mg (PHOSPHA 250 NEUTRAL) 250 MG per tablet Take 1 tablet (250 mg) by mouth daily 90 tablet 3     potassium chloride ER (KLOR-CON M) 20 MEQ CR tablet Take 1 tablet (20 mEq) by mouth daily 90 tablet 3     prochlorperazine (COMPAZINE) 5 MG tablet Take 1 tablet (5 mg) by mouth every 6 hours as needed for nausea or vomiting . Caution: causes sedation. 30 tablet 1     SUMAtriptan (IMITREX) 50 MG tablet Take 1 tablet (50 mg) by mouth at onset of headache for migraine May repeat in 2 hours. Max 4 tablets/24 hours. 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     Thyroid disease     removed pituitary gland     Past Surgical History:   Procedure Laterality Date     COLONOSCOPY N/A 12/17/2020    Procedure: COLONOSCOPY;  " Surgeon: Kne Camacho MD;  Location: [facility]     ENDOSCOPIC RETROGRADE CHOLANGIOPANCREATOGRAM N/A 12/23/2022    Procedure: ENDOSCOPIC RETROGRADE CHOLANGIOPANCREATOGRAPHY;  Surgeon: Jim Lamar MD;  Location: [facility] OR     ENT SURGERY       ESOPHAGOSCOPY, GASTROSCOPY, DUODENOSCOPY (EGD), COMBINED N/A 12/27/2022    Procedure: ESOPHAGOGASTRODUODENOSCOPY (EGD);  Surgeon: Brenton Francois MD;  Location: [facility] GI     HERNIA REPAIR       INSERT PORT VASCULAR ACCESS Right 1/28/2022    Procedure: INSERTION, VASCULAR ACCESS PORT;  Surgeon: Daniel Kinney MD;  Location: [facility]     IR CHEST PORT PLACEMENT > 5 YRS OF AGE  1/28/2022     LAPAROSCOPIC CHOLECYSTECTOMY N/A 12/24/2022    Procedure: CHOLECYSTECTOMY, LAPAROSCOPIC;  Surgeon: Froy More DO;  Location: [facility] OR     LARYNGOSCOPY, EXCISE VOCAL CORD LESION MICROSCOPIC, COMBINED Left 07/01/2021    Procedure: MICROLARYNGOSCOPY, LEFT TRUE VOCAL CORD INJECTION WITH PROLARYN;  Surgeon: Kyree Bearden MD;  Location: [facility]     PHACOEMULSIFICATION WITH STANDARD INTRAOCULAR LENS IMPLANT Right 03/10/2021    Procedure: Cataract removal with implant.;  Surgeon: Jaimr Mac MD;  Location: [facility]     PHACOEMULSIFICATION WITH STANDARD INTRAOCULAR LENS IMPLANT Left 04/05/2021    Procedure: Cataract removal with implant.;  Surgeon: Jamir Mac MD;  Location: [facility]     PICC DOUBLE LUMEN PLACEMENT Right 12/01/2021    5FR DL PICC, basilic vein. L-38cm, 1cm out.     PICC DOUBLE LUMEN PLACEMENT Right 01/04/2022    Right cephalic, 41 cm, 1 external length     PITUITARY EXCISION       tooth pulled 4/7  Right      Allergies   Allergen Reactions     Penicillins Hives and Swelling     Occurred as small child   Pt tolerated meropenem 12/23/22       Social History   Social History     Tobacco Use     Smoking status: Never     Smokeless tobacco: Never   Substance Use Topics     Alcohol use: Yes     Comment: rare     Drug use: Never       Past medical history and social history were reviewed.    Physical Examination:  There were no vitals taken for this visit.  Wt Readings from Last 10 Encounters:   02/06/23 63.3 kg (139 lb 9.6 oz)   01/26/23 59.3 kg (130 lb 12.8 oz)   01/19/23 59.5 kg (131 lb 3.2 oz)   01/10/23 63.5 kg (140 lb)   01/05/23 61.5 kg (135 lb 8 oz)   12/27/22 65 kg (143 lb 6.4 oz)   12/26/22 62.1 kg (137 lb)   12/22/22 62.5 kg (137 lb 12.8 oz)   12/22/22 70.3 kg (155 lb)   11/26/22 70.7 kg (155 lb 14.4 oz)     Video physical exam  General: Patient appears well in no acute distress.   Skin: No visualized rash or lesions on visualized skin  Eyes: EOMI, no erythema, sclera icterus or discharge noted  Resp: Appears to be breathing comfortably without accessory muscle usage, speaking in full sentences, no cough  MSK: Appears to have normal range of motion based on visualized movements  Neurologic: No apparent tremors, facial movements symmetric  Psych: affect normal, alert and oriented    Laboratory Data:   Latest Reference Range & Units 02/08/23 09:54   Sodium 136 - 145 mmol/L 139   Potassium 3.4 - 5.3 mmol/L 3.7   Chloride 98 - 107 mmol/L 102   Carbon Dioxide (CO2) 22 - 29 mmol/L 27   Urea Nitrogen 8.0 - 23.0 mg/dL 23.7 (H)   Creatinine 0.67 - 1.17 mg/dL 1.17   GFR Estimate >60 mL/min/1.73m2 65   Calcium 8.8 - 10.2 mg/dL 9.1   Anion Gap 7 - 15 mmol/L 10   Magnesium 1.7 - 2.3 mg/dL 1.9   Phosphorus 2.5 - 4.5 mg/dL 2.2 (L)   Albumin 3.5 - 5.2 g/dL 3.2 (L)   Protein Total 6.4 - 8.3 g/dL 6.1 (L)   Alkaline Phosphatase 40 - 129 U/L 168 (H)   ALT 10 - 50 U/L 41   AST 10 - 50 U/L 38   Bilirubin Total <=1.2 mg/dL 0.2   Glucose 70 - 99 mg/dL 162 (H)   WBC 4.0 - 11.0 10e3/uL 15.1 (H)   Hemoglobin 13.3 - 17.7 g/dL 9.8 (L)   Hematocrit 40.0 - 53.0 % 32.2 (L)   Platelet Count 150 - 450 10e3/uL 167   RBC Count 4.40 - 5.90 10e6/uL 3.21 (L)   MCV 78 - 100 fL 100   MCH 26.5 - 33.0 pg 30.5   MCHC 31.5 - 36.5 g/dL 30.4 (L)   RDW 10.0 - 15.0 % 15.9 (H)    % Neutrophils % 73   % Lymphocytes % 14   % Monocytes % 9   % Eosinophils % 0   % Basophils % 0   Absolute Basophils 0.0 - 0.2 10e3/uL 0.1   Absolute Eosinophils 0.0 - 0.7 10e3/uL 0.0   Absolute Immature Granulocytes <=0.4 10e3/uL 0.6 (H)   Absolute Lymphocytes 0.8 - 5.3 10e3/uL 2.1   Absolute Monocytes 0.0 - 1.3 10e3/uL 1.4 (H)   % Immature Granulocytes % 4   Absolute Neutrophils 1.6 - 8.3 10e3/uL 11.0 (H)   Absolute NRBCs 10e3/uL 0.0   NRBCs per 100 WBC <1 /100 0   (H): Data is abnormally high  (L): Data is abnormally low    Assessment and Plan:  #  Metastatic spindle cell sarcoma  S/p progression on multiple lines of therapy.  Has not tolerated prior chemotherapy well. Most recently received Trabectedin x 1 cycle six weeks ago; complicated by HUSSEIN, hepatic toxicity, nausea, anorexia, dehydration, pancytopenia. Now more recently course complicated by acute cholecystitis s/p lap carmelita 12/24, re-admission for sepsis and anemia.     Plan was to start Pazopanib, however he clinically has not been doing well (fatigue, weakness, poor appetite, pain). Repeat CT 1/25/23 overall with only mild progression/stable disease so continuing to hold off on chemotherapy.    Clinically doing quite well. He is getting local radiation to splenic mass this week.     Will continue weekly labs and IVF. CT and MD 4 weeks after radiation to assess disease status. Continue to hold any chemotherapy.      #  Chronic/recurrent mucositis   Ongoing throughout all of treatment. No thrush currently. Continue salt/soda swishes. Will send message to ENT to see if he can be seen again given ongoing issues.      # Poor Oral Intake  # Dehydration  # Renal Insufficiency   # Hypokalemia  # Hypophosphatemia  Was requiring IVF 1-2 days/week in [place]. Continues to require this, is scheduled weekly. Finally doing better. Switched lisinopril to amlodipine and creat dramatically improved.      Continue weekly labs and IVF at [place]. Continue potassium and  phos supplement. Confirmed he is not on NSAIDs.      # Acute LFT elevation with hyperbilirubinemia, resolving  # Obstructive cholelithiasis, s/p lap carmelita  He is 3 weeks post lap-carmelita. LFTs continue to improve.     # Chemo-related pancytopenia, resolved  # Acute/chronic anemia  2/2 chemotherapy. Recent hospitalization, noted to have acute anemia 6.4 (baseline 7-8). No bleeding source identified on ERCP, EGD and CT abd. Surgery felt unlikely related to post-op loss following lap carmelita. Stabilized after pRBC. Holding Celebrex/NSAIDs. Could have been related to acute infectious process/reactive with sepsis. Now likely ongoing with progressive malignancy.      Hgb slightly lower at 9.8. Continue weekly monitoring and transfuse if hgb <8.       # Cancer related pain  Increased pain left flank area related to progression of the splenic mass. Continue Oxycodone 5-10mg every 4 hours PRN. Continue topical lidocaine. Avoid NSAIDs. Palliative involved. Doing radiation.      # Hypopituitarism  # Hypothyroidism  Continues Hydrocortisone and Synthroid-confirmed he is taking. Stress doses hydrocortisone with acute illness. Follows with Endo. TSH WNL.    He has been taking double dose hydrocortisone for a few months. He is finally doing better and will attempt going back to his standard dosing and see how he does.      #   Nausea, dyspepsia  Continue Zofran and Compazine. Now also on Zyprexa and notes improved appetite. Tums PRN.      #   Hoarseness  Likely secondary to mediastinal mass. Status post vocal cord injection in July with ENT     #  Depression/anxiety  Restarted on Lexapro by palliative. Mood brighter today. Will get EKG this week with multiple QT prolonging meds.      # HTN  Stopped lisinopril for possible contribution to elevated creat and switched to Amlodipine 5mg daily. Creat improved, BP stable. Unfortunately does have mild edema. Since manageable will continue amlodipine and start back with compression stockings.      # Leukocytosis  Unclear etiology. No infectious symptoms, actually feeling very well. Possibly related to double dose hydrocortisone? Vs reactive? Will continue close monitoring. He understands to call or go to ED with any infectious concerns.     FINAL PLAN  - Weekly labs and IVF  - EKG 2/17/23  - Continue radiation locally  - Will follow-up with ENT on mouth concerns  - Monitor for infections with leukocytosis  - Go back to standard hydrocortisone dosing (stop stress dose steroids since he is doing better)   - Compression socks for edema  - No chemo  - Repeat CT and see MD in 4 weeks    50 minutes spent on the date of the encounter doing chart review, review of test results, interpretation of tests, patient visit and documentation     Maynor Rios PA-C  Department of Hematology and Oncology  [facility]         Again, thank you for allowing me to participate in the care of your patient.        Sincerely,        GERALDO Mullins

## 2023-02-14 NOTE — LETTER
2/14/2023         RE: Ifrah Huitron  [id number] [address] [facility] [address]        Dear Colleague,    Thank you for referring your patient, Ifrah Huitron, to the [facility]. Please see a copy of my visit note below.    Video-Visit Details    Type of service:  Video Visit    Video Start Time (time video started): 1:30pm    Video End Time (time video stopped): 1:48pm    Originating Location (pt. 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 History as follows:  - March 2021 presented with chest pain and back pain, imaging with lung mets and biopsy of mediastinal mass consistent with spindle cell sarcoma with high PD-L1. Baseline imaging lung mets, left sided subdiaphragmatic mass, liver lesions.   - June 2021-October 2021 Pembrolizumab, tolerated well, stopped due to disease progression  - October 2021-February 2022 Doxil + Ifos, not tolerated well, required multiple treatment delays, IVF support, dose reductions, horrible mucositis. Stopped due to tolerance  - March 2022-August 2022 Doxil alone, not tolerated well with ongoing skin and mouth toxicity, requiring delays and dose reductions. Stopped due to disease progression  - August 2022-November 2022 Gemzar alone, not tolerated well with cytopenias and edema, stopped due to disease progression  - November 2022 Trabectedin, only had one cycle. Horrible toxicity with HUSSEIN, hepatic toxicity, cytopenias, required hospital admission. Stopped due to poor tolerance and disease progression  -December 2022 Admission for acute cholecystitis s/p lap carmelita 12/24/22, repeat admission after discharge for sepsis and pain     Plan to start Pazopanib as  next line therapy for sarcoma, however haven't been able to start due to ongoing fatigue, fevers (ED visit 1/10/23 discharged on Levaquin), and poorly controlled pain.    CT 1/25/23 with slight progression of splenic mass, otherwise stable disease. As such opted to do radiation to mass and hold off on chemotherapy for the time.     Interval History:  Ifrah is doing really well. The last week is the best he has felt in a long time. He notes improvement in energy levels and mood. Pain is well controlled with Oxycodone and lidocaine. He is taking Lexapro and Olanzapine, eating and drinking well. Mouth still is very sensitive and sore, though no thrush. Has noted increased lower extremity edema since starting amlodipine, though hasn't been wearing compression stockings. Intermittent cough stable.      Denies fevers, chest pain, SOB, nausea, vomiting, abdominal pain, bowel or bladder concerns, rashes.     He has noted slight flare in pain and fatigue after his first radiation yesterday but better this afternoon.     Current Outpatient Medications   Medication Sig Dispense Refill     acetaminophen (TYLENOL) 325 MG tablet Take 2 tablets (650 mg) by mouth every 6 hours as needed for mild pain or other (and adjunct with moderate or severe pain or per patient request)       amLODIPine (NORVASC) 5 MG tablet Take 1 tablet (5 mg) by mouth daily 30 tablet 0     aspirin-acetaminophen-caffeine (EXCEDRIN MIGRAINE) 250-250-65 MG tablet Take 1 tablet by mouth daily as needed for headaches       doxepin (SINEQUAN) 10 MG/ML (HIGH CONC) solution Take 2.5 mLs (25 mg) by mouth 2 times daily as needed (rinse for mucositis) Dilute the 2.5 mL of doxepin to 5 ml total in sterile or distilled water. 118 mL 0     escitalopram (LEXAPRO) 10 MG tablet Take 1 tablet (10 mg) by mouth daily 30 tablet 0     guaiFENesin-codeine (GUAIFENESIN AC) 100-10 MG/5ML syrup Take 10 mLs by mouth every 4 hours as needed for cough 118 mL 0     hydrocortisone (CORTEF)  "10 MG tablet Take 20 mg in the morning and 10 mg in the afternoon 270 tablet 2     Insulin Syringe-Needle U-100 27.5G X 5/8\" 2 ML MISC 1 each daily as needed (with solucortef for adrenal crisis) 10 each 1     levothyroxine (SYNTHROID/LEVOTHROID) 75 MCG tablet Take one tab p.o. 6 days per week and 0.5 tabs one day per week ( total of 6.5 tablets weekly) 90 tablet 2     lidocaine (XYLOCAINE) 5 % external ointment Apply topically 4 times daily as needed for moderate pain (4-6) 240 g 1     OLANZapine (ZYPREXA) 5 MG tablet Take 1 tablet (5 mg) by mouth 2 times daily as needed 60 tablet 1     ondansetron (ZOFRAN) 4 MG tablet Take 1-2 tablets (4-8 mg) by mouth every 8 hours as needed for nausea 60 tablet 3     oxyCODONE (ROXICODONE) 5 MG tablet Take 1-2 tablets (5-10 mg) by mouth every 4 hours as needed for moderate to severe pain 100 tablet 0     [START ON 3/6/2023] pazopanib (VOTRIENT) 200 MG tablet Take 4 tablets (800 mg) by mouth daily Take on an empty stomach 1 hour before or 2 hours after a meal. (Patient not taking: Reported on 1/27/2023) 120 tablet 11     phosphorus tablet 250 mg (PHOSPHA 250 NEUTRAL) 250 MG per tablet Take 1 tablet (250 mg) by mouth daily 90 tablet 3     potassium chloride ER (KLOR-CON M) 20 MEQ CR tablet Take 1 tablet (20 mEq) by mouth daily 90 tablet 3     prochlorperazine (COMPAZINE) 5 MG tablet Take 1 tablet (5 mg) by mouth every 6 hours as needed for nausea or vomiting . Caution: causes sedation. 30 tablet 1     SUMAtriptan (IMITREX) 50 MG tablet Take 1 tablet (50 mg) by mouth at onset of headache for migraine May repeat in 2 hours. Max 4 tablets/24 hours. 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     Thyroid disease     removed pituitary gland     Past Surgical History:   Procedure Laterality Date     COLONOSCOPY N/A 12/17/2020    Procedure: COLONOSCOPY;  " Surgeon: Ken Camacho MD;  Location: [facility]     ENDOSCOPIC RETROGRADE CHOLANGIOPANCREATOGRAM N/A 12/23/2022    Procedure: ENDOSCOPIC RETROGRADE CHOLANGIOPANCREATOGRAPHY;  Surgeon: Jim Lamar MD;  Location: [facility] [place] OR     ENT SURGERY       ESOPHAGOSCOPY, GASTROSCOPY, DUODENOSCOPY (EGD), COMBINED N/A 12/27/2022    Procedure: ESOPHAGOGASTRODUODENOSCOPY (EGD);  Surgeon: Brenton Francois MD;  Location: [facility] GI     HERNIA REPAIR       INSERT PORT VASCULAR ACCESS Right 1/28/2022    Procedure: INSERTION, VASCULAR ACCESS PORT;  Surgeon: Daniel Kinney MD;  Location: [facility] [place] [facility]     IR CHEST PORT PLACEMENT > 5 YRS OF AGE  1/28/2022     LAPAROSCOPIC CHOLECYSTECTOMY N/A 12/24/2022    Procedure: CHOLECYSTECTOMY, LAPAROSCOPIC;  Surgeon: Froy More DO;  Location: [facility] [place] OR     LARYNGOSCOPY, EXCISE VOCAL CORD LESION MICROSCOPIC, COMBINED Left 07/01/2021    Procedure: MICROLARYNGOSCOPY, LEFT TRUE VOCAL CORD INJECTION WITH PROLARYN;  Surgeon: Kyree Bearden MD;  Location: [facility]     PHACOEMULSIFICATION WITH STANDARD INTRAOCULAR LENS IMPLANT Right 03/10/2021    Procedure: Cataract removal with implant.;  Surgeon: Jamir Mac MD;  Location: [facility]     PHACOEMULSIFICATION WITH STANDARD INTRAOCULAR LENS IMPLANT Left 04/05/2021    Procedure: Cataract removal with implant.;  Surgeon: Jamir Mac MD;  Location: [facility]     PICC DOUBLE LUMEN PLACEMENT Right 12/01/2021    5FR DL PICC, basilic vein. L-38cm, 1cm out.     PICC DOUBLE LUMEN PLACEMENT Right 01/04/2022    Right cephalic, 41 cm, 1 external length     PITUITARY EXCISION       tooth pulled 4/7  Right      Allergies   Allergen Reactions     Penicillins Hives and Swelling     Occurred as small child   Pt tolerated meropenem 12/23/22       Social History   Social History     Tobacco Use     Smoking status: Never     Smokeless tobacco: Never   Substance Use Topics     Alcohol use: Yes     Comment: rare     Drug use: Never       Past medical history and social history were reviewed.    Physical Examination:  There were no vitals taken for this visit.  Wt Readings from Last 10 Encounters:   02/06/23 63.3 kg (139 lb 9.6 oz)   01/26/23 59.3 kg (130 lb 12.8 oz)   01/19/23 59.5 kg (131 lb 3.2 oz)   01/10/23 63.5 kg (140 lb)   01/05/23 61.5 kg (135 lb 8 oz)   12/27/22 65 kg (143 lb 6.4 oz)   12/26/22 62.1 kg (137 lb)   12/22/22 62.5 kg (137 lb 12.8 oz)   12/22/22 70.3 kg (155 lb)   11/26/22 70.7 kg (155 lb 14.4 oz)     Video physical exam  General: Patient appears well in no acute distress.   Skin: No visualized rash or lesions on visualized skin  Eyes: EOMI, no erythema, sclera icterus or discharge noted  Resp: Appears to be breathing comfortably without accessory muscle usage, speaking in full sentences, no cough  MSK: Appears to have normal range of motion based on visualized movements  Neurologic: No apparent tremors, facial movements symmetric  Psych: affect normal, alert and oriented    Laboratory Data:   Latest Reference Range & Units 02/08/23 09:54   Sodium 136 - 145 mmol/L 139   Potassium 3.4 - 5.3 mmol/L 3.7   Chloride 98 - 107 mmol/L 102   Carbon Dioxide (CO2) 22 - 29 mmol/L 27   Urea Nitrogen 8.0 - 23.0 mg/dL 23.7 (H)   Creatinine 0.67 - 1.17 mg/dL 1.17   GFR Estimate >60 mL/min/1.73m2 65   Calcium 8.8 - 10.2 mg/dL 9.1   Anion Gap 7 - 15 mmol/L 10   Magnesium 1.7 - 2.3 mg/dL 1.9   Phosphorus 2.5 - 4.5 mg/dL 2.2 (L)   Albumin 3.5 - 5.2 g/dL 3.2 (L)   Protein Total 6.4 - 8.3 g/dL 6.1 (L)   Alkaline Phosphatase 40 - 129 U/L 168 (H)   ALT 10 - 50 U/L 41   AST 10 - 50 U/L 38   Bilirubin Total <=1.2 mg/dL 0.2   Glucose 70 - 99 mg/dL 162 (H)   WBC 4.0 - 11.0 10e3/uL 15.1 (H)   Hemoglobin 13.3 - 17.7 g/dL 9.8 (L)   Hematocrit 40.0 - 53.0 % 32.2 (L)   Platelet Count 150 - 450 10e3/uL 167   RBC Count 4.40 - 5.90 10e6/uL 3.21 (L)   MCV 78 - 100 fL 100   MCH 26.5 - 33.0 pg 30.5   MCHC 31.5 - 36.5 g/dL 30.4 (L)   RDW 10.0 - 15.0 % 15.9 (H)    % Neutrophils % 73   % Lymphocytes % 14   % Monocytes % 9   % Eosinophils % 0   % Basophils % 0   Absolute Basophils 0.0 - 0.2 10e3/uL 0.1   Absolute Eosinophils 0.0 - 0.7 10e3/uL 0.0   Absolute Immature Granulocytes <=0.4 10e3/uL 0.6 (H)   Absolute Lymphocytes 0.8 - 5.3 10e3/uL 2.1   Absolute Monocytes 0.0 - 1.3 10e3/uL 1.4 (H)   % Immature Granulocytes % 4   Absolute Neutrophils 1.6 - 8.3 10e3/uL 11.0 (H)   Absolute NRBCs 10e3/uL 0.0   NRBCs per 100 WBC <1 /100 0   (H): Data is abnormally high  (L): Data is abnormally low    Assessment and Plan:  #  Metastatic spindle cell sarcoma  S/p progression on multiple lines of therapy.  Has not tolerated prior chemotherapy well. Most recently received Trabectedin x 1 cycle six weeks ago; complicated by HUSSEIN, hepatic toxicity, nausea, anorexia, dehydration, pancytopenia. Now more recently course complicated by acute cholecystitis s/p lap carmelita 12/24, re-admission for sepsis and anemia.     Plan was to start Pazopanib, however he clinically has not been doing well (fatigue, weakness, poor appetite, pain). Repeat CT 1/25/23 overall with only mild progression/stable disease so continuing to hold off on chemotherapy.    Clinically doing quite well. He is getting local radiation to splenic mass this week.     Will continue weekly labs and IVF. CT and MD 4 weeks after radiation to assess disease status. Continue to hold any chemotherapy.      #  Chronic/recurrent mucositis   Ongoing throughout all of treatment. No thrush currently. Continue salt/soda swishes. Will send message to ENT to see if he can be seen again given ongoing issues.      # Poor Oral Intake  # Dehydration  # Renal Insufficiency   # Hypokalemia  # Hypophosphatemia  Was requiring IVF 1-2 days/week in [place]. Continues to require this, is scheduled weekly. Finally doing better. Switched lisinopril to amlodipine and creat dramatically improved.      Continue weekly labs and IVF at [place]. Continue potassium and  phos supplement. Confirmed he is not on NSAIDs.      # Acute LFT elevation with hyperbilirubinemia, resolving  # Obstructive cholelithiasis, s/p lap carmelita  He is 3 weeks post lap-carmelita. LFTs continue to improve.     # Chemo-related pancytopenia, resolved  # Acute/chronic anemia  2/2 chemotherapy. Recent hospitalization, noted to have acute anemia 6.4 (baseline 7-8). No bleeding source identified on ERCP, EGD and CT abd. Surgery felt unlikely related to post-op loss following lap carmelita. Stabilized after pRBC. Holding Celebrex/NSAIDs. Could have been related to acute infectious process/reactive with sepsis. Now likely ongoing with progressive malignancy.      Hgb slightly lower at 9.8. Continue weekly monitoring and transfuse if hgb <8.       # Cancer related pain  Increased pain left flank area related to progression of the splenic mass. Continue Oxycodone 5-10mg every 4 hours PRN. Continue topical lidocaine. Avoid NSAIDs. Palliative involved. Doing radiation.      # Hypopituitarism  # Hypothyroidism  Continues Hydrocortisone and Synthroid-confirmed he is taking. Stress doses hydrocortisone with acute illness. Follows with Endo. TSH WNL.    He has been taking double dose hydrocortisone for a few months. He is finally doing better and will attempt going back to his standard dosing and see how he does.      #   Nausea, dyspepsia  Continue Zofran and Compazine. Now also on Zyprexa and notes improved appetite. Tums PRN.      #   Hoarseness  Likely secondary to mediastinal mass. Status post vocal cord injection in July with ENT     #  Depression/anxiety  Restarted on Lexapro by palliative. Mood brighter today. Will get EKG this week with multiple QT prolonging meds.      # HTN  Stopped lisinopril for possible contribution to elevated creat and switched to Amlodipine 5mg daily. Creat improved, BP stable. Unfortunately does have mild edema. Since manageable will continue amlodipine and start back with compression stockings.      # Leukocytosis  Unclear etiology. No infectious symptoms, actually feeling very well. Possibly related to double dose hydrocortisone? Vs reactive? Will continue close monitoring. He understands to call or go to ED with any infectious concerns.     FINAL PLAN  - Weekly labs and IVF  - EKG 2/17/23  - Continue radiation locally  - Will follow-up with ENT on mouth concerns  - Monitor for infections with leukocytosis  - Go back to standard hydrocortisone dosing (stop stress dose steroids since he is doing better)   - Compression socks for edema  - No chemo  - Repeat CT and see MD in 4 weeks    50 minutes spent on the date of the encounter doing chart review, review of test results, interpretation of tests, patient visit and documentation     Maynor Rios PA-C  Department of Hematology and Oncology  [facility]         Again, thank you for allowing me to participate in the care of your patient.        Sincerely,        GERALDO Mullins

## 2023-02-14 NOTE — NURSING NOTE
Is the patient currently in the state of [place]? YES    Visit mode:VIDEO    If the visit is dropped, the patient can be reconnected by: VIDEO VISIT: Send to e-mail at: [email or web address]    Will anyone else be joining the visit? NO      How would you like to obtain your AVS? MyChart    Are changes needed to the allergy or medication list? NO    Laurie Urbina, JENNIFER

## 2023-02-14 NOTE — PROGRESS NOTES
Video-Visit Details    Type of service:  Video Visit    Video Start Time (time video started): 1:30pm    Video End Time (time video stopped): 1:48pm    Originating Location (pt. 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 History as follows:  - March 2021 presented with chest pain and back pain, imaging with lung mets and biopsy of mediastinal mass consistent with spindle cell sarcoma with high PD-L1. Baseline imaging lung mets, left sided subdiaphragmatic mass, liver lesions.   - June 2021-October 2021 Pembrolizumab, tolerated well, stopped due to disease progression  - October 2021-February 2022 Doxil + Ifos, not tolerated well, required multiple treatment delays, IVF support, dose reductions, horrible mucositis. Stopped due to tolerance  - March 2022-August 2022 Doxil alone, not tolerated well with ongoing skin and mouth toxicity, requiring delays and dose reductions. Stopped due to disease progression  - August 2022-November 2022 Gemzar alone, not tolerated well with cytopenias and edema, stopped due to disease progression  - November 2022 Trabectedin, only had one cycle. Horrible toxicity with HUSSEIN, hepatic toxicity, cytopenias, required hospital admission. Stopped due to poor tolerance and disease progression  -December 2022 Admission for acute cholecystitis s/p lap carmelita 12/24/22, repeat admission after discharge for sepsis and pain     Plan to start Pazopanib as next line therapy for sarcoma, however haven't been able to start due to ongoing fatigue, fevers (ED visit 1/10/23 discharged on Levaquin), and poorly controlled pain.    CT 1/25/23 with slight progression of splenic mass, otherwise stable disease.  "As such opted to do radiation to mass and hold off on chemotherapy for the time.     Interval History:  Ifrah is doing really well. The last week is the best he has felt in a long time. He notes improvement in energy levels and mood. Pain is well controlled with Oxycodone and lidocaine. He is taking Lexapro and Olanzapine, eating and drinking well. Mouth still is very sensitive and sore, though no thrush. Has noted increased lower extremity edema since starting amlodipine, though hasn't been wearing compression stockings. Intermittent cough stable.      Denies fevers, chest pain, SOB, nausea, vomiting, abdominal pain, bowel or bladder concerns, rashes.     He has noted slight flare in pain and fatigue after his first radiation yesterday but better this afternoon.     Current Outpatient Medications   Medication Sig Dispense Refill     acetaminophen (TYLENOL) 325 MG tablet Take 2 tablets (650 mg) by mouth every 6 hours as needed for mild pain or other (and adjunct with moderate or severe pain or per patient request)       amLODIPine (NORVASC) 5 MG tablet Take 1 tablet (5 mg) by mouth daily 30 tablet 0     aspirin-acetaminophen-caffeine (EXCEDRIN MIGRAINE) 250-250-65 MG tablet Take 1 tablet by mouth daily as needed for headaches       doxepin (SINEQUAN) 10 MG/ML (HIGH CONC) solution Take 2.5 mLs (25 mg) by mouth 2 times daily as needed (rinse for mucositis) Dilute the 2.5 mL of doxepin to 5 ml total in sterile or distilled water. 118 mL 0     escitalopram (LEXAPRO) 10 MG tablet Take 1 tablet (10 mg) by mouth daily 30 tablet 0     guaiFENesin-codeine (GUAIFENESIN AC) 100-10 MG/5ML syrup Take 10 mLs by mouth every 4 hours as needed for cough 118 mL 0     hydrocortisone (CORTEF) 10 MG tablet Take 20 mg in the morning and 10 mg in the afternoon 270 tablet 2     Insulin Syringe-Needle U-100 27.5G X 5/8\" 2 ML MISC 1 each daily as needed (with solucortef for adrenal crisis) 10 each 1     levothyroxine (SYNTHROID/LEVOTHROID) 75 " MCG tablet Take one tab p.o. 6 days per week and 0.5 tabs one day per week ( total of 6.5 tablets weekly) 90 tablet 2     lidocaine (XYLOCAINE) 5 % external ointment Apply topically 4 times daily as needed for moderate pain (4-6) 240 g 1     OLANZapine (ZYPREXA) 5 MG tablet Take 1 tablet (5 mg) by mouth 2 times daily as needed 60 tablet 1     ondansetron (ZOFRAN) 4 MG tablet Take 1-2 tablets (4-8 mg) by mouth every 8 hours as needed for nausea 60 tablet 3     oxyCODONE (ROXICODONE) 5 MG tablet Take 1-2 tablets (5-10 mg) by mouth every 4 hours as needed for moderate to severe pain 100 tablet 0     [START ON 3/6/2023] pazopanib (VOTRIENT) 200 MG tablet Take 4 tablets (800 mg) by mouth daily Take on an empty stomach 1 hour before or 2 hours after a meal. (Patient not taking: Reported on 1/27/2023) 120 tablet 11     phosphorus tablet 250 mg (PHOSPHA 250 NEUTRAL) 250 MG per tablet Take 1 tablet (250 mg) by mouth daily 90 tablet 3     potassium chloride ER (KLOR-CON M) 20 MEQ CR tablet Take 1 tablet (20 mEq) by mouth daily 90 tablet 3     prochlorperazine (COMPAZINE) 5 MG tablet Take 1 tablet (5 mg) by mouth every 6 hours as needed for nausea or vomiting . Caution: causes sedation. 30 tablet 1     SUMAtriptan (IMITREX) 50 MG tablet Take 1 tablet (50 mg) by mouth at onset of headache for migraine May repeat in 2 hours. Max 4 tablets/24 hours. 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     Thyroid disease     removed pituitary gland     Past Surgical History:   Procedure Laterality Date     COLONOSCOPY N/A 12/17/2020    Procedure: COLONOSCOPY;  Surgeon: Ken Camacho MD;  Location: [facility]     ENDOSCOPIC RETROGRADE CHOLANGIOPANCREATOGRAM N/A 12/23/2022    Procedure: ENDOSCOPIC RETROGRADE CHOLANGIOPANCREATOGRAPHY;  Surgeon: Jim Lamar MD;  Location: [facility] OR     ENT SURGERY        ESOPHAGOSCOPY, GASTROSCOPY, DUODENOSCOPY (EGD), COMBINED N/A 12/27/2022    Procedure: ESOPHAGOGASTRODUODENOSCOPY (EGD);  Surgeon: Brenton Francois MD;  Location: [facility] GI     HERNIA REPAIR       INSERT PORT VASCULAR ACCESS Right 1/28/2022    Procedure: INSERTION, VASCULAR ACCESS PORT;  Surgeon: Daniel Kinney MD;  Location: [facility] OR     IR CHEST PORT PLACEMENT > 5 YRS OF AGE  1/28/2022     LAPAROSCOPIC CHOLECYSTECTOMY N/A 12/24/2022    Procedure: CHOLECYSTECTOMY, LAPAROSCOPIC;  Surgeon: Froy More DO;  Location: [facility] OR     LARYNGOSCOPY, EXCISE VOCAL CORD LESION MICROSCOPIC, COMBINED Left 07/01/2021    Procedure: MICROLARYNGOSCOPY, LEFT TRUE VOCAL CORD INJECTION WITH PROLARYN;  Surgeon: Kyree Bearden MD;  Location: [facility] OR     PHACOEMULSIFICATION WITH STANDARD INTRAOCULAR LENS IMPLANT Right 03/10/2021    Procedure: Cataract removal with implant.;  Surgeon: Jamir Mac MD;  Location: [facility] OR     PHACOEMULSIFICATION WITH STANDARD INTRAOCULAR LENS IMPLANT Left 04/05/2021    Procedure: Cataract removal with implant.;  Surgeon: Jamir Mac MD;  Location: [facility] OR     PICC DOUBLE LUMEN PLACEMENT Right 12/01/2021    5FR DL PICC, basilic vein. L-38cm, 1cm out.     PICC DOUBLE LUMEN PLACEMENT Right 01/04/2022    Right cephalic, 41 cm, 1 external length     PITUITARY EXCISION       tooth pulled 4/7  Right      Allergies   Allergen Reactions     Penicillins Hives and Swelling     Occurred as small child   Pt tolerated meropenem 12/23/22       Social History   Social History     Tobacco Use     Smoking status: Never     Smokeless tobacco: Never   Substance Use Topics     Alcohol use: Yes     Comment: rare     Drug use: Never      Past medical history and social history were reviewed.    Physical Examination:  There were no vitals taken for this visit.  Wt Readings from Last 10 Encounters:   02/06/23 63.3 kg (139 lb 9.6 oz)   01/26/23 59.3 kg (130 lb 12.8 oz)   01/19/23 59.5  kg (131 lb 3.2 oz)   01/10/23 63.5 kg (140 lb)   01/05/23 61.5 kg (135 lb 8 oz)   12/27/22 65 kg (143 lb 6.4 oz)   12/26/22 62.1 kg (137 lb)   12/22/22 62.5 kg (137 lb 12.8 oz)   12/22/22 70.3 kg (155 lb)   11/26/22 70.7 kg (155 lb 14.4 oz)     Video physical exam  General: Patient appears well in no acute distress.   Skin: No visualized rash or lesions on visualized skin  Eyes: EOMI, no erythema, sclera icterus or discharge noted  Resp: Appears to be breathing comfortably without accessory muscle usage, speaking in full sentences, no cough  MSK: Appears to have normal range of motion based on visualized movements  Neurologic: No apparent tremors, facial movements symmetric  Psych: affect normal, alert and oriented    Laboratory Data:   Latest Reference Range & Units 02/08/23 09:54   Sodium 136 - 145 mmol/L 139   Potassium 3.4 - 5.3 mmol/L 3.7   Chloride 98 - 107 mmol/L 102   Carbon Dioxide (CO2) 22 - 29 mmol/L 27   Urea Nitrogen 8.0 - 23.0 mg/dL 23.7 (H)   Creatinine 0.67 - 1.17 mg/dL 1.17   GFR Estimate >60 mL/min/1.73m2 65   Calcium 8.8 - 10.2 mg/dL 9.1   Anion Gap 7 - 15 mmol/L 10   Magnesium 1.7 - 2.3 mg/dL 1.9   Phosphorus 2.5 - 4.5 mg/dL 2.2 (L)   Albumin 3.5 - 5.2 g/dL 3.2 (L)   Protein Total 6.4 - 8.3 g/dL 6.1 (L)   Alkaline Phosphatase 40 - 129 U/L 168 (H)   ALT 10 - 50 U/L 41   AST 10 - 50 U/L 38   Bilirubin Total <=1.2 mg/dL 0.2   Glucose 70 - 99 mg/dL 162 (H)   WBC 4.0 - 11.0 10e3/uL 15.1 (H)   Hemoglobin 13.3 - 17.7 g/dL 9.8 (L)   Hematocrit 40.0 - 53.0 % 32.2 (L)   Platelet Count 150 - 450 10e3/uL 167   RBC Count 4.40 - 5.90 10e6/uL 3.21 (L)   MCV 78 - 100 fL 100   MCH 26.5 - 33.0 pg 30.5   MCHC 31.5 - 36.5 g/dL 30.4 (L)   RDW 10.0 - 15.0 % 15.9 (H)   % Neutrophils % 73   % Lymphocytes % 14   % Monocytes % 9   % Eosinophils % 0   % Basophils % 0   Absolute Basophils 0.0 - 0.2 10e3/uL 0.1   Absolute Eosinophils 0.0 - 0.7 10e3/uL 0.0   Absolute Immature Granulocytes <=0.4 10e3/uL 0.6 (H)   Absolute  Lymphocytes 0.8 - 5.3 10e3/uL 2.1   Absolute Monocytes 0.0 - 1.3 10e3/uL 1.4 (H)   % Immature Granulocytes % 4   Absolute Neutrophils 1.6 - 8.3 10e3/uL 11.0 (H)   Absolute NRBCs 10e3/uL 0.0   NRBCs per 100 WBC <1 /100 0   (H): Data is abnormally high  (L): Data is abnormally low    Assessment and Plan:  #  Metastatic spindle cell sarcoma  S/p progression on multiple lines of therapy.  Has not tolerated prior chemotherapy well. Most recently received Trabectedin x 1 cycle six weeks ago; complicated by HUSSEIN, hepatic toxicity, nausea, anorexia, dehydration, pancytopenia. Now more recently course complicated by acute cholecystitis s/p lap carmelita 12/24, re-admission for sepsis and anemia.     Plan was to start Pazopanib, however he clinically has not been doing well (fatigue, weakness, poor appetite, pain). Repeat CT 1/25/23 overall with only mild progression/stable disease so continuing to hold off on chemotherapy.    Clinically doing quite well. He is getting local radiation to splenic mass this week.     Will continue weekly labs and IVF. CT and MD 4 weeks after radiation to assess disease status. Continue to hold any chemotherapy.      #  Chronic/recurrent mucositis   Ongoing throughout all of treatment. No thrush currently. Continue salt/soda swishes. Will send message to ENT to see if he can be seen again given ongoing issues.      # Poor Oral Intake  # Dehydration  # Renal Insufficiency   # Hypokalemia  # Hypophosphatemia  Was requiring IVF 1-2 days/week in [place]. Continues to require this, is scheduled weekly. Finally doing better. Switched lisinopril to amlodipine and creat dramatically improved.      Continue weekly labs and IVF at [place]. Continue potassium and phos supplement. Confirmed he is not on NSAIDs.      # Acute LFT elevation with hyperbilirubinemia, resolving  # Obstructive cholelithiasis, s/p lap carmelita  He is 3 weeks post lap-carmelita. LFTs continue to improve.     # Chemo-related pancytopenia,  resolved  # Acute/chronic anemia  2/2 chemotherapy. Recent hospitalization, noted to have acute anemia 6.4 (baseline 7-8). No bleeding source identified on ERCP, EGD and CT abd. Surgery felt unlikely related to post-op loss following lap carmelita. Stabilized after pRBC. Holding Celebrex/NSAIDs. Could have been related to acute infectious process/reactive with sepsis. Now likely ongoing with progressive malignancy.      Hgb slightly lower at 9.8. Continue weekly monitoring and transfuse if hgb <8.       # Cancer related pain  Increased pain left flank area related to progression of the splenic mass. Continue Oxycodone 5-10mg every 4 hours PRN. Continue topical lidocaine. Avoid NSAIDs. Palliative involved. Doing radiation.      # Hypopituitarism  # Hypothyroidism  Continues Hydrocortisone and Synthroid-confirmed he is taking. Stress doses hydrocortisone with acute illness. Follows with Endo. TSH WNL.    He has been taking double dose hydrocortisone for a few months. He is finally doing better and will attempt going back to his standard dosing and see how he does.      #   Nausea, dyspepsia  Continue Zofran and Compazine. Now also on Zyprexa and notes improved appetite. Tums PRN.      #   Hoarseness  Likely secondary to mediastinal mass. Status post vocal cord injection in July with ENT     #  Depression/anxiety  Restarted on Lexapro by palliative. Mood brighter today. Will get EKG this week with multiple QT prolonging meds.      # HTN  Stopped lisinopril for possible contribution to elevated creat and switched to Amlodipine 5mg daily. Creat improved, BP stable. Unfortunately does have mild edema. Since manageable will continue amlodipine and start back with compression stockings.     # Leukocytosis  Unclear etiology. No infectious symptoms, actually feeling very well. Possibly related to double dose hydrocortisone? Vs reactive? Will continue close monitoring. He understands to call or go to ED with any infectious  concerns.     FINAL PLAN  - Weekly labs and IVF  - EKG 2/17/23  - Continue radiation locally  - Will follow-up with ENT on mouth concerns  - Monitor for infections with leukocytosis  - Go back to standard hydrocortisone dosing (stop stress dose steroids since he is doing better)   - Compression socks for edema  - No chemo  - Repeat CT and see MD in 4 weeks    50 minutes spent on the date of the encounter doing chart review, review of test results, interpretation of tests, patient visit and documentation     Maynor Rios PA-C  Department of Hematology and Oncology  [facility]

## 2023-02-15 NOTE — LETTER
2/15/2023      RE: Ifrah Huitron  [address] [address]       [facility]  SPECIALIZING IN BREAKTHROUGHS  Radiation Oncology    On Treatment Visit Note      Ifrah Huitron      Date: 2/15/2023   MRN: [id number]   : 1950  Diagnosis: metastatic spindle cell carcinoma      Reason for Visit:  On Radiation Treatment Visit     Treatment Summary to Date  Treatment Site: abdomen Current Dose: 1200/2000 cGy Fractions: 3/5      Chemotherapy  Chemo concurrent with radx?: No    Subjective:   Doing okay.  Denies any pain response currently since starting RT.  Taking oxycodone twice a day as needed.  No GI bother.  Medical oncology team following blood counts.    Nursing ROS:   Nutrition Alteration  Diet Type: Patient's Preference  Nutrition Note: forces self to eat, not much appetite, minimal taste  Skin  Skin Reaction: 0 - No changes     ENT and Mouth Exam  ENT/Mouth Note: recovering from oral thrush, no taste, minimal appetite  Cardiovascular  Respiratory effort: 2 - Mild dyspnea on exertion  Cardio/Resp Note: getting IVF 1x per week  Gastrointestinal  GI Note: occasional nausea, zofran works well, bowels ok\        Pain Assessment  Pain Note: pain in LLQ of his back, takes oxycodone tabs and lidocain gel      Objective:   BP (!) 148/73 (BP Location: Left arm, Cuff Size: Adult Large)   Pulse 97   Resp 16   Wt 66.5 kg (146 lb 9.6 oz)   SpO2 97%   BMI 22.96 kg/m    No apparent distress  + TTP in L flank region corresponding to splenic region mass    Labs:  CBC RESULTS: Recent Labs   Lab Test 23  0954   WBC 15.1*   RBC 3.21*   HGB 9.8*   HCT 32.2*      MCH 30.5   MCHC 30.4*   RDW 15.9*        ELECTROLYTES:  Recent Labs   Lab Test 23  0954      POTASSIUM 3.7   CHLORIDE 102   COTY 9.1   CO2 27   BUN 23.7*   CR 1.17   *       Assessment:  Mr. Huitron is a 72 year old male with a diagnosis of metastatic spindle cell sarcoma status post multiple systemic therapies  with progressive splenic metastasis and associated pain.  Evaluate role for palliative radiation therapy.    Tolerating radiation therapy well.  All questions and concerns addressed.    Plan:   1. Continue current therapy.    2. EOT on Friday.  Return to clinic in 2 weeks.    Mosaiq chart and setup information reviewed  Ports checked    Medication Review  Med list reviewed with patient?: Yes           Blayne Adamson MD

## 2023-02-15 NOTE — LETTER
2/15/2023         RE: Ifrah Huitron  [address] [address] [place] [address] [place] [address]        Dear Colleague,    Thank you for referring your patient, Ifrah Huitron, to the [facility]. Please see a copy of my visit note below.    [facility]  SPECIALIZING IN BREAKTHROUGHS  Radiation Oncology    On Treatment Visit Note      Ifrah Huitron      Date: 2/15/2023   MRN: [id number]   : 1950  Diagnosis: metastatic spindle cell carcinoma      Reason for Visit:  On Radiation Treatment Visit     Treatment Summary to Date  Treatment Site: abdomen Current Dose: 1200/2000 cGy Fractions: 3/5      Chemotherapy  Chemo concurrent with radx?: No    Subjective:   Doing okay.  Denies any pain response currently since starting RT.  Taking oxycodone twice a day as needed.  No GI bother.  Medical oncology team following blood counts.    Nursing ROS:   Nutrition Alteration  Diet Type: Patient's Preference  Nutrition Note: forces self to eat, not much appetite, minimal taste  Skin  Skin Reaction: 0 - No changes     ENT and Mouth Exam  ENT/Mouth Note: recovering from oral thrush, no taste, minimal appetite  Cardiovascular  Respiratory effort: 2 - Mild dyspnea on exertion  Cardio/Resp Note: getting IVF 1x per week  Gastrointestinal  GI Note: occasional nausea, zofran works well, bowels ok\        Pain Assessment  Pain Note: pain in LLQ of his back, takes oxycodone tabs and lidocain gel      Objective:   BP (!) 148/73 (BP Location: Left arm, Cuff Size: Adult Large)   Pulse 97   Resp 16   Wt 66.5 kg (146 lb 9.6 oz)   SpO2 97%   BMI 22.96 kg/m    No apparent distress  + TTP in L flank region corresponding to splenic region mass    Labs:  CBC RESULTS: Recent Labs   Lab Test 23  0954   WBC 15.1*   RBC 3.21*   HGB 9.8*   HCT 32.2*      MCH 30.5   MCHC 30.4*   RDW 15.9*        ELECTROLYTES:  Recent Labs   Lab Test 23  0954      POTASSIUM 3.7   CHLORIDE 102   COTY 9.1   CO2 27   BUN 23.7*   CR 1.17    *       Assessment:  Mr. 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  Evaluate role for palliative radiation therapy.    Tolerating radiation therapy well.  All questions and concerns addressed.    Plan:   1. Continue current therapy.    2. EOT on Friday.  Return to clinic in 2 weeks.    Mosaiq chart and setup information reviewed  Ports checked    Medication Review  Med list reviewed with patient?: Yes           Blayne Adamson MD          Again, thank you for allowing me to participate in the care of your patient.        Sincerely,        Blayne Adamson MD

## 2023-02-16 NOTE — PROGRESS NOTES
[facility]  SPECIALIZING IN BREAKTHROUGHS  Radiation Oncology    On Treatment Visit Note      Ifrah Huitron      Date: 2/15/2023   MRN: [id number]   : 1950  Diagnosis: metastatic spindle cell carcinoma      Reason for Visit:  On Radiation Treatment Visit     Treatment Summary to Date  Treatment Site: abdomen Current Dose: 1200/2000 cGy Fractions: 3/5      Chemotherapy  Chemo concurrent with radx?: No    Subjective:   Doing okay.  Denies any pain response currently since starting RT.  Taking oxycodone twice a day as needed.  No GI bother.  Medical oncology team following blood counts.    Nursing ROS:   Nutrition Alteration  Diet Type: Patient's Preference  Nutrition Note: forces self to eat, not much appetite, minimal taste  Skin  Skin Reaction: 0 - No changes     ENT and Mouth Exam  ENT/Mouth Note: recovering from oral thrush, no taste, minimal appetite  Cardiovascular  Respiratory effort: 2 - Mild dyspnea on exertion  Cardio/Resp Note: getting IVF 1x per week  Gastrointestinal  GI Note: occasional nausea, zofran works well, bowels ok\        Pain Assessment  Pain Note: pain in LLQ of his back, takes oxycodone tabs and lidocain gel      Objective:   BP (!) 148/73 (BP Location: Left arm, Cuff Size: Adult Large)   Pulse 97   Resp 16   Wt 66.5 kg (146 lb 9.6 oz)   SpO2 97%   BMI 22.96 kg/m    No apparent distress  + TTP in L flank region corresponding to splenic region mass    Labs:  CBC RESULTS: Recent Labs   Lab Test 23  0954   WBC 15.1*   RBC 3.21*   HGB 9.8*   HCT 32.2*      MCH 30.5   MCHC 30.4*   RDW 15.9*        ELECTROLYTES:  Recent Labs   Lab Test 23  0954      POTASSIUM 3.7   CHLORIDE 102   COTY 9.1   CO2 27   BUN 23.7*   CR 1.17   *       Assessment:  Mr. 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  Evaluate role for  palliative radiation therapy.    Tolerating radiation therapy well.  All questions and concerns addressed.    Plan:   1. Continue current therapy.    2. EOT on Friday.  Return to clinic in 2 weeks.    Mosaiq chart and setup information reviewed  Ports checked    Medication Review  Med list reviewed with patient?: Yes           Blayne Adamson MD

## 2023-02-17 NOTE — PROGRESS NOTES
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 Huitron is a 72 year old male a diagnosis of metastatic spindle cell sarcoma.     The patient initially presented in 2021 with symptoms of chest pain and back pain.  Imaging demonstrated concern for lung mets and mediastinal mass.  Biopsy obtained demonstrated spindle cell sarcoma.  Initial presentation the patient was noted to have pulmonary metastasis, splenic mass, and hepatic lesions.  Patient has been on several systemic therapies including pembrolizumab, Doxil and frusemide, Doxil alone, Gemzar alone, trabectedin which was discontinued due to toxicity.  Patient has had admission to the hospital due to cytopenia, UHSSEIN, and hepatotoxicity.  He also had a recent admission for acute cholecystitis requiring laparoscopic cholecystectomy in 2022.  Most recent restaging scans on 2023 demonstrated continued enlargement of a splenic mass with likely invasion of the hemidiaphragm.  There was minimal increase in size of anterior mediastinal mass noted prior.  The patient seen medical oncology team we discussed consideration of systemic therapy with pazopanib, which the patient has not yet started.  The patient noted increasing pain corresponding likely to the enlarging splenic mass. Referred to our clinic to discuss the potential role for radiation therapy.     Seen in our clinic on 23.Patient reports at least moderate pain levels in the left splenig region. Taking oxycodone twice a day with some alleviation.    SITE OF TREATMENT: Abdomen    DATES  OF TREATMENT: 23  to 2/17/23    TOTAL DOSE OF TREATMENT: 2,000 cGy    DOSE PER FRACTION OF TREATMENT: 400 cGy       COMMENT/TOXICITY:  None                PAIN MANAGEMENT: Oxycodone BID prn                     FOLLOW UP PLAN: 2 weeks  CC  Patient Care Team:  Sukhdev Kohler MD as PCP - General (Family Practice)  Sukhdev Kohler MD as Assigned PCP  Luke Wolff MD as MD (Medical Oncology)  Fabiola Armstrong PA-C as Physician Assistant (Dermatology)  Sal Handy MD as Assigned Palliative Care Provider  Maynor Rios PA as Assigned Cancer Care Provider  Jay Jay Hernandez MD as Cardiologist (Cardiovascular Disease)  Jay Jay Hernandez MD as Assigned Heart and Vascular Provider  Jamir Grant MD as MD (Endocrinology, Diabetes, and Metabolism)  Luiz Castaneda RN as Specialty Care Coordinator (Hematology & Oncology)  Froy Hirsch PsyD as Psychologist (PSYCHOLOGIST CLINICAL)  Froy Hirsch PsyD as Assigned Behavioral Health Provider  Elpidio Tao MD as Assigned Endocrinology Provider  Kyree Bearden MD as Assigned Surgical Provider       GERALDO Tsang  Department of Radiation Oncology  [facility]

## 2023-02-17 NOTE — PROGRESS NOTES
Infusion Nursing Note:  Ifrah Huitron presents today for fluids..    Patient seen by provider today: No   present during visit today: Not Applicable.    Note: Pt states he feels really good today. No concerns.    Intravenous Access:  Implanted Port.    Treatment Conditions:  Results reviewed, labs did NOT meet treatment parameters for transfusion  Hgb 9.8.    Post Infusion Assessment:  Patient tolerated infusion without incident.  Blood return noted pre and post infusion.  Site patent and intact, free from redness, edema or discomfort.  No evidence of extravasations.  Access discontinued per protocol.     Discharge Plan:   Patient discharged in stable condition accompanied by: self.  Departure Mode: Ambulatory.      Anup Velasco RN

## 2023-02-20 NOTE — TELEPHONE ENCOUNTER
guafen-codeine Refill   Last prescribing provider: Maynor CHOW    Last clinic visit date: 2/14/23 Maynor CHOW    Recommendations for requested medication (if none, N/A):     guaiFENesin-codeine (GUAIFENESIN AC) 100-10 MG/5ML syrup Take 10 mLs by mouth every 4 hours as needed for cough 118 mL 0       Any other pertinent information (if none, N/A): N/A    Refilled: Y/N, if NO, why?

## 2023-02-23 NOTE — PROGRESS NOTES
Infusion Nursing Note:  Ifrah Huitron presents today for PAC labs and fluids.    Patient seen by provider today: No   present during visit today: Not Applicable.    Note: N/A.    Intravenous Access:  Labs drawn without difficulty.  Implanted Port.    Treatment Conditions:  Lab Results   Component Value Date    HGB 9.2 (L) 02/23/2023    WBC 8.0 02/23/2023    ANEU 15.5 (H) 12/26/2022    ANEUTAUTO 6.6 02/23/2023     02/23/2023      Lab Results   Component Value Date     02/23/2023    POTASSIUM 4.3 02/23/2023    MAG 2.1 02/23/2023    CR 1.27 (H) 02/23/2023    COTY 9.1 02/23/2023    BILITOTAL 0.2 02/23/2023    ALBUMIN 3.2 (L) 02/23/2023    ALT 21 02/23/2023    AST 23 02/23/2023       Post Infusion Assessment:  Patient tolerated infusion without incident.  Blood return noted pre and post infusion.  Site patent and intact, free from redness, edema or discomfort.  No evidence of extravasations.  Access discontinued per protocol.     Discharge Plan:   Patient discharged in stable condition accompanied by: self.  Departure Mode: Ambulatory.  Pt to return on 3/2/23 at 9:45 am for pac labs followed by possible blood transfusion and fluids.       Cathy Mann RN                       Physical Therapy Daily Progress Note     Patient Name: Azar Lo         :  1966  Referring Physician: Alec Sellers MD     Subjective   Azar Lo reports: decreasing instances of lateral lower leg pain - Sleeping well - able to sit longer, but notes an ache in his buttock  Still working regular job - doesn't notice his pain while working despite crawling, bending, etc, but has avoided lifting anything heavy -       Objective   Prone positioning, press-ups, etc helpful in centralizing and lessening symptoms -   (+) SLR RLE / Neural Tension     See Exercise, Manual, and Modality Logs for complete treatment.     Functional / Therapeutic Activities: 20  min  · TAPING / BRACING: NA  · SEE EXERCISE FLOW SHEET -   · Jt protection, ADL modification; Posture and       Assessment/Plan;  Lumbar strain; RLE Radiculopathy; Possible Disc involvement L5? MRI confirmation of disc protrusion at L4-5 to (R) especially at L5 nerve root -   Prone positioning / extension and Traction centralized and decreased pain -   Overall improving, but symptoms persistent at times worse-   Sleeping and prolonged sitting / bending increases symptoms -   Foot numbness due to Neuroma vs Radiculopathy - May benefit from orthotic with metatarsal bar to unload MPJ 's   Lately more lower symptoms since working bent over at work recently -   Progress strengthening /stabilization /functional activity -       _________________________________________________  Manual Therapy:                 mins  18694;  Therapeutic Exercise:   20     mins  98400;     Neuromuscular Lala:        mins  67109;    Therapeutic Activity:      20    mins  82057;     Lumbar Traction:           20      mins  81795;     Ultrasound:                     05     mins  11825;    Electrical Stimulation:    20     mins  34632 ( ); w/ traction  Dry Needling                       mins self-pay     Timed Treatment:   45   mins                   Total Treatment:     75   mins

## 2023-02-23 NOTE — PROGRESS NOTES
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 Pembro started summer 2021.  8/2021 scan shows response to pembro.  10/2021 progression-->doxil+ifosfamide complicated by mucositis, nausea, thrush, need for dose reduction  12/2021 scans show stable disease; continue doxil/ifos, has needed dose reductions for tolerability  2/2022 scans with slight reduction in tumor size, continue dose reduced doxil/ifos  1/2023 he had several types of systemic therapy in 2022 and progressed/much toxicity, stopped in Nov. Discussing starting pazopanib but held off due to fatigue, fevers, poor qol.   2/2023 palliative RT to splenic mass     Course complicated by dysphonia, working with SLP summer 2021.Vocal cord dysfunction, follows ENT.  Has surgical hypopituitarism.  Has CKD     Social - Lives with wife Abida; 5 adult kids, 19 grandkids. Ran a golf course.      Care Planning - ACP discussion 6/2021 palliative visit. Discussed hospice; he has a lot of personal familiarity with that. Discussed hospice care and timing of enrollment 1/2023 visit.    History:  History gathered today from: patient, medical chart    Has RT to spleen--finished a week ago.  Developed a new pain after this--deep inside 'feels deep in my organs'--LUQ and flank areas he points to    Lidocaine gel used to help but new pain feels too deep.  Takes oxycodone 7.5 mg bid most days   Sleeping ok  He gets great pain relief from the oxycodone--pain 'down to a 2'.  Lasts all day  Naps during day; feels rejuvinated    More exhausted since radiation; globally weaker; knows this can be a side effect    Mood ok  On lexapro  olanzapine 5 mg bid    He hopes to resume chemo; pazopanib; thinks he'll do it soon  Willing to risk side effects for a chance of more longevity \"It'd feel like giving up if I didn't do this.\"      PE: There were no vitals taken for this visit.   Wt " Readings from Last 3 Encounters:   02/15/23 66.5 kg (146 lb 9.6 oz)   02/06/23 63.3 kg (139 lb 9.6 oz)   01/26/23 59.3 kg (130 lb 12.8 oz)     Alert NAD  Pale  Tired appearing  Clear sensorium      Data reviewed:  I reviewed recent labs and imaging, my comments: Cr 1.2 Alb 3.2    CT Jan  IMPRESSION:  1.  Interval resolution of previously described groundglass pulmonary nodules with decrease in size of more solid-appearing pulmonary nodules. These could be infectious/inflammatory, but recommend continued attention on follow-up imaging.  2.  Continued enlargement of left upper quadrant mass involving the spleen with likely invasion of the hemidiaphragm.  3.  Minimal increase in size of anterior mediastinal mass.  4.  Stable small hypodense hepatic lesions, favor cysts.  5.  No evidence of new metastatic disease in the chest, abdomen or pelvis.    [facility] database reviewed: y      Impression & Recommendations:  71 yo with sarcomatoid mesothelioma (LUQ/diaphragmattic mass)     Recently s/p radiotherapy. Some increased LUQ aching which may be a transient RT effect; also increased exhaustion. He's clear it's started, the uptick in his exhaustion, since the RT and we discussed it's likely that and should improve in the coming weeks.    Pain despite this is well controlled as above    Care goals:  He remains hopeful and determined to resume systemic therapy (start pazopanib) which has been on hold for a couple months. Thinks he'll start it soon. Knows it's likely the last line of systemic therapy. Feels at peace about that and however does not think he'd be at peace with not at least trying it and is willing to risk qol impairments for a chance of longevity. We've done extensive hospice and EOL planning.     Follow-up 6 weeks; sooner if needed        34 minutes spent on the date of the encounter doing chart review, history and exam, patient education & counseling, documentation and other activities as noted above.    Thank you  for involving us in the patient's care.   Sal Handy MD / Palliative Medicine / Pam bassett via [facility].      Video-Visit Details  Video Start Time: 3:08 PM  Video End Time:3:27 PM  Originating Location (pt. Location): Home  Distant Location (provider location):  Off-site  Platform used for Video Visit: Thomas

## 2023-02-23 NOTE — LETTER
2/23/2023       RE: Ifrah [place]uitron  [address] [facility] [address]     Dear Colleague,    Thank you for referring your patient, Ifrah Huitron, to the [facility] at [facility]. Please see a copy of my visit note below.    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 Pembro started summer 2021.  8/2021 scan shows response to pembro.  10/2021 progression-->doxil+ifosfamide complicated by mucositis, nausea, thrush, need for dose reduction  12/2021 scans show stable disease; continue doxil/ifos, has needed dose reductions for tolerability  2/2022 scans with slight reduction in tumor size, continue dose reduced doxil/ifos  1/2023 he had several types of systemic therapy in 2022 and progressed/much toxicity, stopped in Nov. Discussing starting pazopanib but held off due to fatigue, fevers, poor qol.   2/2023 palliative RT to splenic mass     Course complicated by dysphonia, working with SLP summer 2021.Vocal cord dysfunction, follows ENT.  Has surgical hypopituitarism.  Has CKD     Social - Lives with wife Abida; 5 adult kids, 19 grandkids. Ran a golf course.      Care Planning - ACP discussion 6/2021 palliative visit. Discussed hospice; he has a lot of personal familiarity with that. Discussed hospice care and timing of enrollment 1/2023 visit.    History:  History gathered today from: patient, medical chart    Has RT to spleen--finished a week ago.  Developed a new pain after this--deep inside 'feels deep in my organs'--LUQ and flank areas he points to    Lidocaine gel used to help but new pain feels too deep.  Takes oxycodone 7.5 mg bid most days   Sleeping ok  He gets great pain relief from the oxycodone--pain 'down to a 2'.  Lasts all day  Naps during day; feels rejuvinated    More exhausted since radiation; globally weaker; knows  "this can be a side effect    Mood ok  On lexapro  olanzapine 5 mg bid    He hopes to resume chemo; pazopanib; thinks he'll do it soon  Willing to risk side effects for a chance of more longevity \"It'd feel like giving up if I didn't do this.\"      PE: There were no vitals taken for this visit.   Wt Readings from Last 3 Encounters:   02/15/23 66.5 kg (146 lb 9.6 oz)   02/06/23 63.3 kg (139 lb 9.6 oz)   01/26/23 59.3 kg (130 lb 12.8 oz)     Alert NAD  Pale  Tired appearing  Clear sensorium      Data reviewed:  I reviewed recent labs and imaging, my comments: Cr 1.2 Alb 3.2    CT Jan  IMPRESSION:  1.  Interval resolution of previously described groundglass pulmonary nodules with decrease in size of more solid-appearing pulmonary nodules. These could be infectious/inflammatory, but recommend continued attention on follow-up imaging.  2.  Continued enlargement of left upper quadrant mass involving the spleen with likely invasion of the hemidiaphragm.  3.  Minimal increase in size of anterior mediastinal mass.  4.  Stable small hypodense hepatic lesions, favor cysts.  5.  No evidence of new metastatic disease in the chest, abdomen or pelvis.     database reviewed: y      Impression & Recommendations:  71 yo with sarcomatoid mesothelioma (LUQ/diaphragmattic mass)     Recently s/p radiotherapy. Some increased LUQ aching which may be a transient RT effect; also increased exhaustion. He's clear it's started, the uptick in his exhaustion, since the RT and we discussed it's likely that and should improve in the coming weeks.    Pain despite this is well controlled as above    Care goals:  He remains hopeful and determined to resume systemic therapy (start pazopanib) which has been on hold for a couple months. Thinks he'll start it soon. Knows it's likely the last line of systemic therapy. Feels at peace about that and however does not think he'd be at peace with not at least trying it and is willing to risk qol impairments " for a chance of longevity. We've done extensive hospice and EOL planning.     Follow-up 6 weeks; sooner if needed        34 minutes spent on the date of the encounter doing chart review, history and exam, patient education & counseling, documentation and other activities as noted above.    Thank you for involving us in the patient's care.     Sal Handy MD / Palliative Medicine / Text me via [facility].

## 2023-02-23 NOTE — NURSING NOTE
Is the patient currently in the state of [place]? YES    Visit mode:VIDEO    If the visit is dropped, the patient can be reconnected by: VIDEO VISIT: Text to cell phone: [phone]    Will anyone else be joining the visit? NO      How would you like to obtain your AVS? MyChart    Are changes needed to the allergy or medication list? NO  Patient verified medications and allergies are correct via eCheck-in. Patient confirms no changes at this time and/or since last reviewed by clinic staff.    Reason for visit: Ifrah Huitron 72 year old male presents today for palliative care f/u on Spindle cell sarcoma.  Evy Morris, Virtual Facilitator

## 2023-02-28 NOTE — TELEPHONE ENCOUNTER
2/28/23    Called patient to discuss rash. Started on Sunday. Mainly on his face, trunk including back, and legs. His wife feels like the rash is more diffuse today, less pin point. No pain with the rash and very mild itchiness. Hasn't put anything on it. Has never had a rash like this before. No fevers since Friday. He denies any URI symptoms including no sore throat. No new medications at all. No new herbal supplements or foods. No new soaps, lotions, laundry detergents. The most recent med change was switching to amlodipine from lisinopril a month ago.    - Will hold amlodipine as this is the only possible drug culprit   - Will do course of prednisone 40mg x 2 days, then 20mg x 2 days, then stop  - Will ask Dr. Adamson to look at it as well.     Also notes slight discomfort with urination. Will check UA when he goes for labs this week.    Maynor Rios PA-C

## 2023-03-01 NOTE — LETTER
3/1/2023     RE: Ifrah Huitron  [id number]  [facility] [place] [facility] [address]    Dear Colleague,    Thank you for referring your patient, Ifrah Huitron, to the [facility]. Please see a copy of my visit note below.    Radiation Oncology Note    HPI: Mr. 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  He underwent palliative radiation therapy.    Radiation Treatment  1.  2/13/23 to 2/17/23: Abdomen, 2000 cGy in 5 fractions    The patient returns for follow up.    He has noticed KY since completion of RT. Remains on narcotic, but noticed less intensity. Fatigued, but slowly improving. Counts post RT thus far have looked okay. No bowel change.     Has noticed rash in torso and extremities. Medical oncology team discussed stopping new medication (amlodipine) and this has helped. Will also start steroid course.     Plan:  1. [place] to palliative RT.  2. RTC in 2 weeks.    Blayne Adamson M.D.  Department of Radiation Oncology  [facility]

## 2023-03-01 NOTE — NURSING NOTE
FOLLOW-UP VISIT    Patient Name: Ifrah Huitron      : 1950     Age: 72 year old        ______________________________________________________________________________     Chief Complaint   Patient presents with     Radiation Therapy     otv     BP (!) 146/83   Pulse 74   Temp 98.6  F (37  C) (Oral)   Resp 18   Wt 66.5 kg (146 lb 9.6 oz)   SpO2 98%   BMI 22.96 kg/m       Date Radiation Completed: 23    Pain  Current history of pain associated with this visit:   Intensity: 1/10  Current: dull and aching  Location: L side of low back  Treatment: oxycodone 7.5 mg 2x/day, topical lidocaine PRN - has been using less    Meds  Current Med List Reviewed: Yes  Medication Note:     Labs  Other Labs: Yes: having labs closely folllowed by med onc    Imaging  None    Skin: Warm  Dry  Intact  Energy Level: fair to good.  Had a rough day last Friday - pain6-8, fatigue, fever 100.4, was in bed all day - but started to feel improvement that evening. Monday and Tuesday has felt improved energy and feeling better overall.    Other Appointments:     Date  MD Name:  NP       Other Notes:

## 2023-03-01 NOTE — PROGRESS NOTES
Radiation Oncology Note    HPI: Mr. 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  He underwent palliative radiation therapy.    Radiation Treatment  1.  2/13/23 to 2/17/23: Abdomen, 2000 cGy in 5 fractions    The patient returns for follow up.    He has noticed CT since completion of RT. Remains on narcotic, but noticed less intensity. Fatigued, but slowly improving. Counts post RT thus far have looked okay. No bowel change.     Has noticed rash in torso and extremities. Medical oncology team discussed stopping new medication (amlodipine) and this has helped. Will also start steroid course.     Plan:  1. CT to palliative RT.  2. RTC in 2 weeks.    Blayne Adamson M.D.  Department of Radiation Oncology  [facility]

## 2023-03-02 NOTE — PROGRESS NOTES
Here for lab draw. Port accessed without difficulty.  Beth Monterroso RN on 3/2/2023 at 10:18 AM

## 2023-03-02 NOTE — PROGRESS NOTES
Infusion Nursing Note:  Ifrah Huitron presents today for Fluids Possible PRBC   Patient seen by provider today: No   present during visit today: Not Applicable.    Note: N/A    Intravenous Access:  Labs drawn without difficulty.  Implanted Port.    Treatment Conditions:  Lab Results   Component Value Date    HGB 9.6 (L) 03/02/2023    WBC 14.0 (H) 03/02/2023    ANEU 12.6 (H) 03/02/2023    ANEUTAUTO 6.6 02/23/2023     03/02/2023      Results reviewed, labs did NOT meet treatment parameters: Hgb 9.6 PLTS 193.  IV Fluids given    Post Infusion Assessment:  Patient tolerated infusion without incident.  Blood return noted pre and post infusion.  Site patent and intact, free from redness, edema or discomfort.  No evidence of extravasations.  Access discontinued per protocol.     Discharge Plan:   Patient discharged in stable condition accompanied by: self.  Departure Mode: Ambulatory.      Beth Monterroso RN

## 2023-03-06 NOTE — PROGRESS NOTES
Video-Visit Details    Type of service:  Video Visit    Video Start Time (time video started): 11:30am    Video End Time (time video stopped): 11:45am    Originating Location (pt. 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 History as follows:  - March 2021 presented with chest pain and back pain, imaging with lung mets and biopsy of mediastinal mass consistent with spindle cell sarcoma with high PD-L1. Baseline imaging lung mets, left sided subdiaphragmatic mass, liver lesions.   - June 2021-October 2021 Pembrolizumab, tolerated well, stopped due to disease progression  - October 2021-February 2022 Doxil + Ifos, not tolerated well, required multiple treatment delays, IVF support, dose reductions, horrible mucositis. Stopped due to tolerance  - March 2022-August 2022 Doxil alone, not tolerated well with ongoing skin and mouth toxicity, requiring delays and dose reductions. Stopped due to disease progression  - August 2022-November 2022 Gemzar alone, not tolerated well with cytopenias and edema, stopped due to disease progression  - November 2022 Trabectedin, only had one cycle. Horrible toxicity with HUSSEIN, hepatic toxicity, cytopenias, required hospital admission. Stopped due to poor tolerance and disease progression  -December 2022 Admission for acute cholecystitis s/p lap carmelita 12/24/22, repeat admission after discharge for sepsis and pain     Plan to start Pazopanib as next line therapy for sarcoma, however haven't been able to start due to ongoing fatigue, fevers (ED visit 1/10/23 discharged on Levaquin), and poorly controlled pain.     CT 1/25/23 with slight progression of splenic mass, otherwise stable  "disease. As such opted to do radiation to mass and hold off on chemotherapy for the time.     Interval History:  Ifrah is overall doing well. His rash resolved with stopping amlodipine and doing prednisone. He did have a bad morning on Saturday for unclear reasons (fatigued, weak, dizzy) but this resolved. He does note increased urination after his IVF appointment. Swelling better. Nausea minimal. No fevers or respiratory concerns. Pain well controlled. Taste continues to be off. Overall he is happy with how well he is doing.    Current Outpatient Medications   Medication Sig Dispense Refill     acetaminophen (TYLENOL) 325 MG tablet Take 2 tablets (650 mg) by mouth every 6 hours as needed for mild pain or other (and adjunct with moderate or severe pain or per patient request)       amLODIPine (NORVASC) 5 MG tablet Take 1 tablet (5 mg) by mouth daily 30 tablet 0     aspirin-acetaminophen-caffeine (EXCEDRIN MIGRAINE) 250-250-65 MG tablet Take 1 tablet by mouth daily as needed for headaches       doxepin (SINEQUAN) 10 MG/ML (HIGH CONC) solution Take 2.5 mLs (25 mg) by mouth 2 times daily as needed (rinse for mucositis) Dilute the 2.5 mL of doxepin to 5 ml total in sterile or distilled water. 118 mL 0     escitalopram (LEXAPRO) 10 MG tablet Take 1 tablet (10 mg) by mouth daily 30 tablet 0     guaiFENesin-codeine (GUAIFENESIN AC) 100-10 MG/5ML syrup Take 10 mLs by mouth every 4 hours as needed for cough 118 mL 0     hydrocortisone (CORTEF) 10 MG tablet Take 20 mg in the morning and 10 mg in the afternoon 270 tablet 2     Insulin Syringe-Needle U-100 27.5G X 5/8\" 2 ML MISC 1 each daily as needed (with solucortef for adrenal crisis) 10 each 1     levothyroxine (SYNTHROID/LEVOTHROID) 75 MCG tablet Take one tab p.o. 6 days per week and 0.5 tabs one day per week ( total of 6.5 tablets weekly) 90 tablet 2     lidocaine (XYLOCAINE) 5 % external ointment Apply topically 4 times daily as needed for moderate pain (4-6) 240 g 1     " OLANZapine (ZYPREXA) 5 MG tablet Take 1 tablet (5 mg) by mouth 2 times daily as needed 60 tablet 1     ondansetron (ZOFRAN) 4 MG tablet Take 1-2 tablets (4-8 mg) by mouth every 8 hours as needed for nausea 60 tablet 3     oxyCODONE (ROXICODONE) 5 MG tablet Take 1-2 tablets (5-10 mg) by mouth every 4 hours as needed for moderate to severe pain 100 tablet 0     phosphorus tablet 250 mg (PHOSPHA 250 NEUTRAL) 250 MG per tablet Take 1 tablet (250 mg) by mouth daily 90 tablet 3     potassium chloride ER (KLOR-CON M) 20 MEQ CR tablet Take 1 tablet (20 mEq) by mouth daily 90 tablet 3     predniSONE (DELTASONE) 20 MG tablet Take 40mg x 2 days, then 20mg x 2 days 6 tablet 0     SUMAtriptan (IMITREX) 50 MG tablet Take 1 tablet (50 mg) by mouth at onset of headache for migraine May repeat in 2 hours. Max 4 tablets/24 hours. 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     Thyroid disease     removed pituitary gland     Past Surgical History:   Procedure Laterality Date     COLONOSCOPY N/A 12/17/2020    Procedure: COLONOSCOPY;  Surgeon: Ken Camacho MD;  Location: [facility]     ENDOSCOPIC RETROGRADE CHOLANGIOPANCREATOGRAM N/A 12/23/2022    Procedure: ENDOSCOPIC RETROGRADE CHOLANGIOPANCREATOGRAPHY;  Surgeon: Jim Lamar MD;  Location: [facility] OR     ENT SURGERY       ESOPHAGOSCOPY, GASTROSCOPY, DUODENOSCOPY (EGD), COMBINED N/A 12/27/2022    Procedure: ESOPHAGOGASTRODUODENOSCOPY (EGD);  Surgeon: Brenton Francois MD;  Location: [facility] GI     HERNIA REPAIR       INSERT PORT VASCULAR ACCESS Right 1/28/2022    Procedure: INSERTION, VASCULAR ACCESS PORT;  Surgeon: Daniel Kinney MD;  Location: [facility] OR     IR CHEST PORT PLACEMENT > 5 YRS OF AGE  1/28/2022     LAPAROSCOPIC CHOLECYSTECTOMY N/A 12/24/2022    Procedure: CHOLECYSTECTOMY, LAPAROSCOPIC;  Surgeon: Froy More DO;   Location: [facility] OR     LARYNGOSCOPY, EXCISE VOCAL CORD LESION MICROSCOPIC, COMBINED Left 07/01/2021    Procedure: MICROLARYNGOSCOPY, LEFT TRUE VOCAL CORD INJECTION WITH PROLARYN;  Surgeon: Kyree Bearden MD;  Location: [facility] OR     PHACOEMULSIFICATION WITH STANDARD INTRAOCULAR LENS IMPLANT Right 03/10/2021    Procedure: Cataract removal with implant.;  Surgeon: Jamir Mac MD;  Location: [facility] OR     PHACOEMULSIFICATION WITH STANDARD INTRAOCULAR LENS IMPLANT Left 04/05/2021    Procedure: Cataract removal with implant.;  Surgeon: Jamir Mac MD;  Location: [facility] OR     PICC DOUBLE LUMEN PLACEMENT Right 12/01/2021    5FR DL PICC, basilic vein. L-38cm, 1cm out.     PICC DOUBLE LUMEN PLACEMENT Right 01/04/2022    Right cephalic, 41 cm, 1 external length     PITUITARY EXCISION       tooth pulled 4/7  Right      Allergies   Allergen Reactions     Penicillins Hives and Swelling     Occurred as small child   Pt tolerated meropenem 12/23/22       Social History   Social History     Tobacco Use     Smoking status: Never     Smokeless tobacco: Never   Substance Use Topics     Alcohol use: Yes     Comment: rare     Drug use: Never      Past medical history and social history were reviewed.    Physical Examination:  There were no vitals taken for this visit.  Wt Readings from Last 10 Encounters:   03/01/23 66.5 kg (146 lb 9.6 oz)   02/15/23 66.5 kg (146 lb 9.6 oz)   02/06/23 63.3 kg (139 lb 9.6 oz)   01/26/23 59.3 kg (130 lb 12.8 oz)   01/19/23 59.5 kg (131 lb 3.2 oz)   01/10/23 63.5 kg (140 lb)   01/05/23 61.5 kg (135 lb 8 oz)   12/27/22 65 kg (143 lb 6.4 oz)   12/26/22 62.1 kg (137 lb)   12/22/22 62.5 kg (137 lb 12.8 oz)     Video physical exam  General: Patient appears well in no acute distress.   Skin: No visualized rash or lesions on visualized skin  Eyes: EOMI, no erythema, sclera icterus or discharge noted  Resp: Appears to be breathing comfortably without accessory muscle usage, speaking in full  sentences, no cough  MSK: Appears to have normal range of motion based on visualized movements  Neurologic: No apparent tremors, facial movements symmetric  Psych: affect normal, alert and oriented    Laboratory Data:   Latest Reference Range & Units 03/02/23 10:03   Sodium 136 - 145 mmol/L 143   Potassium 3.4 - 5.3 mmol/L 3.6   Chloride 98 - 107 mmol/L 104   Carbon Dioxide (CO2) 22 - 29 mmol/L 25   Urea Nitrogen 8.0 - 23.0 mg/dL 20.6   Creatinine 0.67 - 1.17 mg/dL 1.09   GFR Estimate >60 mL/min/1.73m2 71   Calcium 8.8 - 10.2 mg/dL 9.4   Anion Gap 7 - 15 mmol/L 14   Magnesium 1.7 - 2.3 mg/dL 2.1   Phosphorus 2.5 - 4.5 mg/dL 2.4 (L)   Albumin 3.5 - 5.2 g/dL 3.5   Protein Total 6.4 - 8.3 g/dL 6.4   Alkaline Phosphatase 40 - 129 U/L 203 (H)   ALT 10 - 50 U/L 23   AST 10 - 50 U/L 22   Bilirubin Total <=1.2 mg/dL 0.2   Glucose 70 - 99 mg/dL 185 (H)   WBC 4.0 - 11.0 10e3/uL 14.0 (H)   Hemoglobin 13.3 - 17.7 g/dL 9.6 (L)   Hematocrit 40.0 - 53.0 % 30.4 (L)   Platelet Count 150 - 450 10e3/uL 193   RBC Count 4.40 - 5.90 10e6/uL 3.03 (L)   MCV 78 - 100 fL 100   MCH 26.5 - 33.0 pg 31.7   MCHC 31.5 - 36.5 g/dL 31.6   RDW 10.0 - 15.0 % 15.7 (H)   % Neutrophils % 90   % Lymphocytes % 7   % Monocytes % 1   % Eosinophils % 0   % Basophils % 0   % Metamyelocytes % 2   Absolute Basophils 0.0 - 0.2 10e3/uL 0.0   Absolute Neutrophil 1.6 - 8.3 10e3/uL 12.6 (H)   Absolute Lymphocytes 0.8 - 5.3 10e3/uL 1.0   Absolute Monocytes 0.0 - 1.3 10e3/uL 0.1   Absolute Eosinophils 0.0 - 0.7 10e3/uL 0.0   Absolute Metamyelocytes <=0.0 10e3/uL 0.3 (H)   RBC Morphology  Confirmed RBC Indices   Platelet Morphology Automated Count Confirmed. Platelet morphology is normal.  Automated Count Confirmed. Platelet morphology is normal.   (L): Data is abnormally low  (H): Data is abnormally high      Assessment and Plan:  #  Metastatic spindle cell sarcoma  S/p progression on multiple lines of therapy.  Has not tolerated prior chemotherapy well. Most recently  received Trabectedin x 1 cycle six weeks ago; complicated by HUSSEIN, hepatic toxicity, nausea, anorexia, dehydration, pancytopenia. Now more recently course complicated by acute cholecystitis s/p lap carmelita 12/24, re-admission for sepsis and anemia.     Plan was to start Pazopanib, however he clinically has not been doing well (fatigue, weakness, poor appetite, pain). Repeat CT 1/25/23 overall with only mild progression/stable disease so continuing to hold off on chemotherapy.     Clinically doing quite well. Completed radiation to splenic mass locally.      Will continue weekly labs and IVF. CT and MD end of March to assess. Continue to hold chemotherapy.      #  Chronic/recurrent mucositis   Ongoing throughout all of treatment. No thrush currently. Continue salt/soda swishes.       # Poor Oral Intake  # Dehydration  # Renal Insufficiency   # Hypokalemia  # Hypophosphatemia  Was requiring IVF 1-2 days/week in [place]. Continues to require this, is scheduled weekly. Finally doing better.     Continue weekly labs and IVF at [place]. Continue potassium and phos supplement.     If he continues to do well could cut back to every other week fluids.       # Acute LFT elevation with hyperbilirubinemia, resolving  # Obstructive cholelithiasis, s/p lap carmelita  He is 3 weeks post lap-carmelita. LFTs continue to improve.     # Chemo-related pancytopenia, resolved  # Acute/chronic anemia  2/2 chemotherapy. Prior anemia-no bleeding source identified on ERCP, EGD and CT abd. Surgery felt unlikely related to post-op loss following lap carmelita. Stabilized after pRBC. Holding Celebrex/NSAIDs. Could have been related to acute infectious process/reactive with sepsis. Now likely ongoing with progressive malignancy.      Hgb stable at 9.6. Continue weekly monitoring and transfuse if hgb <8.       # Cancer related pain  Continue Oxycodone 5-10mg every 4 hours PRN. Continue topical lidocaine. Avoid NSAIDs. Palliative involved. Completed radiation.       # Hypopituitarism  # Hypothyroidism  Continues Hydrocortisone and Synthroid-confirmed he is taking. Stress doses hydrocortisone with acute illness. Follows with Endo. TSH WNL     #   Nausea, dyspepsia  Continue Zofran and Compazine. Now also on Zyprexa and notes improved appetite. Tums PRN.      #   Hoarseness  Likely secondary to mediastinal mass. Status post vocal cord injection in July with ENT     #  Depression/anxiety  Restarted on Lexapro by palliative. Mood brighter today. QTc WNL.    # HTN  Stopped lisinopril for possible contribution to elevated creat and switched to Amlodipine 5mg daily. Creat improved, BP stable.    Now had to stop Amlodipine for edema and rash, resolved. Pending BP trend may need to reach out to PCP for management.      # Leukocytosis  Possibly 2/2 steroids (was on prednisone burst for rash). Monitor.     15 minutes spent on the date of the encounter doing chart review, review of test results, interpretation of tests, patient visit and documentation     Maynor Rios PA-C  Department of Hematology and Oncology  [facility]

## 2023-03-07 NOTE — LETTER
3/7/2023         RE: Ifrah Huitron  [address] [address] [facility] [address]        Dear Colleague,    Thank you for referring your patient, Ifrah Huitron, to the [facility]. Please see a copy of my visit note below.    Video-Visit Details    Type of service:  Video Visit    Video Start Time (time video started): 11:30am    Video End Time (time video stopped): 11:45am    Originating Location (pt. 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 History as follows:  - March 2021 presented with chest pain and back pain, imaging with lung mets and biopsy of mediastinal mass consistent with spindle cell sarcoma with high PD-L1. Baseline imaging lung mets, left sided subdiaphragmatic mass, liver lesions.   - June 2021-October 2021 Pembrolizumab, tolerated well, stopped due to disease progression  - October 2021-February 2022 Doxil + Ifos, not tolerated well, required multiple treatment delays, IVF support, dose reductions, horrible mucositis. Stopped due to tolerance  - March 2022-August 2022 Doxil alone, not tolerated well with ongoing skin and mouth toxicity, requiring delays and dose reductions. Stopped due to disease progression  - August 2022-November 2022 Gemzar alone, not tolerated well with cytopenias and edema, stopped due to disease progression  - November 2022 Trabectedin, only had one cycle. Horrible toxicity with HUSSEIN, hepatic toxicity, cytopenias, required hospital admission. Stopped due to poor tolerance and disease progression  -December 2022 Admission for acute cholecystitis s/p lap carmelita 12/24/22, repeat admission after discharge for sepsis and pain     Plan to start  "Pazopanib as next line therapy for sarcoma, however haven't been able to start due to ongoing fatigue, fevers (ED visit 1/10/23 discharged on Levaquin), and poorly controlled pain.     CT 1/25/23 with slight progression of splenic mass, otherwise stable disease. As such opted to do radiation to mass and hold off on chemotherapy for the time.     Interval History:  Ifrah is overall doing well. His rash resolved with stopping amlodipine and doing prednisone. He did have a bad morning on Saturday for unclear reasons (fatigued, weak, dizzy) but this resolved. He does note increased urination after his IVF appointment. Swelling better. Nausea minimal. No fevers or respiratory concerns. Pain well controlled. Taste continues to be off. Overall he is happy with how well he is doing.    Current Outpatient Medications   Medication Sig Dispense Refill     acetaminophen (TYLENOL) 325 MG tablet Take 2 tablets (650 mg) by mouth every 6 hours as needed for mild pain or other (and adjunct with moderate or severe pain or per patient request)       amLODIPine (NORVASC) 5 MG tablet Take 1 tablet (5 mg) by mouth daily 30 tablet 0     aspirin-acetaminophen-caffeine (EXCEDRIN MIGRAINE) 250-250-65 MG tablet Take 1 tablet by mouth daily as needed for headaches       doxepin (SINEQUAN) 10 MG/ML (HIGH CONC) solution Take 2.5 mLs (25 mg) by mouth 2 times daily as needed (rinse for mucositis) Dilute the 2.5 mL of doxepin to 5 ml total in sterile or distilled water. 118 mL 0     escitalopram (LEXAPRO) 10 MG tablet Take 1 tablet (10 mg) by mouth daily 30 tablet 0     guaiFENesin-codeine (GUAIFENESIN AC) 100-10 MG/5ML syrup Take 10 mLs by mouth every 4 hours as needed for cough 118 mL 0     hydrocortisone (CORTEF) 10 MG tablet Take 20 mg in the morning and 10 mg in the afternoon 270 tablet 2     Insulin Syringe-Needle U-100 27.5G X 5/8\" 2 ML MISC 1 each daily as needed (with solucortef for adrenal crisis) 10 each 1     levothyroxine " (SYNTHROID/LEVOTHROID) 75 MCG tablet Take one tab p.o. 6 days per week and 0.5 tabs one day per week ( total of 6.5 tablets weekly) 90 tablet 2     lidocaine (XYLOCAINE) 5 % external ointment Apply topically 4 times daily as needed for moderate pain (4-6) 240 g 1     OLANZapine (ZYPREXA) 5 MG tablet Take 1 tablet (5 mg) by mouth 2 times daily as needed 60 tablet 1     ondansetron (ZOFRAN) 4 MG tablet Take 1-2 tablets (4-8 mg) by mouth every 8 hours as needed for nausea 60 tablet 3     oxyCODONE (ROXICODONE) 5 MG tablet Take 1-2 tablets (5-10 mg) by mouth every 4 hours as needed for moderate to severe pain 100 tablet 0     phosphorus tablet 250 mg (PHOSPHA 250 NEUTRAL) 250 MG per tablet Take 1 tablet (250 mg) by mouth daily 90 tablet 3     potassium chloride ER (KLOR-CON M) 20 MEQ CR tablet Take 1 tablet (20 mEq) by mouth daily 90 tablet 3     predniSONE (DELTASONE) 20 MG tablet Take 40mg x 2 days, then 20mg x 2 days 6 tablet 0     SUMAtriptan (IMITREX) 50 MG tablet Take 1 tablet (50 mg) by mouth at onset of headache for migraine May repeat in 2 hours. Max 4 tablets/24 hours. 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     Thyroid disease     removed pituitary gland     Past Surgical History:   Procedure Laterality Date     COLONOSCOPY N/A 12/17/2020    Procedure: COLONOSCOPY;  Surgeon: Ken Camacho MD;  Location: [facility]     ENDOSCOPIC RETROGRADE CHOLANGIOPANCREATOGRAM N/A 12/23/2022    Procedure: ENDOSCOPIC RETROGRADE CHOLANGIOPANCREATOGRAPHY;  Surgeon: Jim Lamar MD;  Location: [facility] OR     ENT SURGERY       ESOPHAGOSCOPY, GASTROSCOPY, DUODENOSCOPY (EGD), COMBINED N/A 12/27/2022    Procedure: ESOPHAGOGASTRODUODENOSCOPY (EGD);  Surgeon: Brenton Francois MD;  Location: [facility] GI     HERNIA REPAIR       INSERT PORT VASCULAR ACCESS Right 1/28/2022    Procedure:  INSERTION, VASCULAR ACCESS PORT;  Surgeon: Daniel Kinney MD;  Location: [facility] OR     IR CHEST PORT PLACEMENT > 5 YRS OF AGE  1/28/2022     LAPAROSCOPIC CHOLECYSTECTOMY N/A 12/24/2022    Procedure: CHOLECYSTECTOMY, LAPAROSCOPIC;  Surgeon: Froy More DO;  [place] [facility] [place]     LARYNGOSCOPY, EXCISE VOCAL CORD LESION MICROSCOPIC, COMBINED Left 07/01/2021    Procedure: MICROLARYNGOSCOPY, LEFT TRUE VOCAL CORD INJECTION WITH PROLARYN;  Surgeon: Kyree Bearden MD;  Location: [facility] OR     PHACOEMULSIFICATION WITH STANDARD INTRAOCULAR LENS IMPLANT Right 03/10/2021    Procedure: Cataract removal with implant.;  Surgeon: Jamir Mac MD;  Location: [facility] OR     PHACOEMULSIFICATION WITH STANDARD INTRAOCULAR LENS IMPLANT Left 04/05/2021    Procedure: Cataract removal with implant.;  Surgeon: Jamir Mac MD;  Location: [facility] OR     PICC DOUBLE LUMEN PLACEMENT Right 12/01/2021    5FR DL PICC, basilic vein. L-38cm, 1cm out.     PICC DOUBLE LUMEN PLACEMENT Right 01/04/2022    Right cephalic, 41 cm, 1 external length     PITUITARY EXCISION       tooth pulled 4/7  Right      Allergies   Allergen Reactions     Penicillins Hives and Swelling     Occurred as small child   Pt tolerated meropenem 12/23/22       Social History   Social History     Tobacco Use     Smoking status: Never     Smokeless tobacco: Never   Substance Use Topics     Alcohol use: Yes     Comment: rare     Drug use: Never      Past medical history and social history were reviewed.    Physical Examination:  There were no vitals taken for this visit.  Wt Readings from Last 10 Encounters:   03/01/23 66.5 kg (146 lb 9.6 oz)   02/15/23 66.5 kg (146 lb 9.6 oz)   02/06/23 63.3 kg (139 lb 9.6 oz)   01/26/23 59.3 kg (130 lb 12.8 oz)   01/19/23 59.5 kg (131 lb 3.2 oz)   01/10/23 63.5 kg (140 lb)   01/05/23 61.5 kg (135 lb 8 oz)   12/27/22 65 kg (143 lb 6.4 oz)   12/26/22 62.1 kg (137 lb)   12/22/22 62.5 kg (137 lb 12.8 oz)     Video  physical exam  General: Patient appears well in no acute distress.   Skin: No visualized rash or lesions on visualized skin  Eyes: EOMI, no erythema, sclera icterus or discharge noted  Resp: Appears to be breathing comfortably without accessory muscle usage, speaking in full sentences, no cough  MSK: Appears to have normal range of motion based on visualized movements  Neurologic: No apparent tremors, facial movements symmetric  Psych: affect normal, alert and oriented    Laboratory Data:   Latest Reference Range & Units 03/02/23 10:03   Sodium 136 - 145 mmol/L 143   Potassium 3.4 - 5.3 mmol/L 3.6   Chloride 98 - 107 mmol/L 104   Carbon Dioxide (CO2) 22 - 29 mmol/L 25   Urea Nitrogen 8.0 - 23.0 mg/dL 20.6   Creatinine 0.67 - 1.17 mg/dL 1.09   GFR Estimate >60 mL/min/1.73m2 71   Calcium 8.8 - 10.2 mg/dL 9.4   Anion Gap 7 - 15 mmol/L 14   Magnesium 1.7 - 2.3 mg/dL 2.1   Phosphorus 2.5 - 4.5 mg/dL 2.4 (L)   Albumin 3.5 - 5.2 g/dL 3.5   Protein Total 6.4 - 8.3 g/dL 6.4   Alkaline Phosphatase 40 - 129 U/L 203 (H)   ALT 10 - 50 U/L 23   AST 10 - 50 U/L 22   Bilirubin Total <=1.2 mg/dL 0.2   Glucose 70 - 99 mg/dL 185 (H)   WBC 4.0 - 11.0 10e3/uL 14.0 (H)   Hemoglobin 13.3 - 17.7 g/dL 9.6 (L)   Hematocrit 40.0 - 53.0 % 30.4 (L)   Platelet Count 150 - 450 10e3/uL 193   RBC Count 4.40 - 5.90 10e6/uL 3.03 (L)   MCV 78 - 100 fL 100   MCH 26.5 - 33.0 pg 31.7   MCHC 31.5 - 36.5 g/dL 31.6   RDW 10.0 - 15.0 % 15.7 (H)   % Neutrophils % 90   % Lymphocytes % 7   % Monocytes % 1   % Eosinophils % 0   % Basophils % 0   % Metamyelocytes % 2   Absolute Basophils 0.0 - 0.2 10e3/uL 0.0   Absolute Neutrophil 1.6 - 8.3 10e3/uL 12.6 (H)   Absolute Lymphocytes 0.8 - 5.3 10e3/uL 1.0   Absolute Monocytes 0.0 - 1.3 10e3/uL 0.1   Absolute Eosinophils 0.0 - 0.7 10e3/uL 0.0   Absolute Metamyelocytes <=0.0 10e3/uL 0.3 (H)   RBC Morphology  Confirmed RBC Indices   Platelet Morphology Automated Count Confirmed. Platelet morphology is normal.  Automated  Count Confirmed. Platelet morphology is normal.   (L): Data is abnormally low  (H): Data is abnormally high      Assessment and Plan:  #  Metastatic spindle cell sarcoma  S/p progression on multiple lines of therapy.  Has not tolerated prior chemotherapy well. Most recently received Trabectedin x 1 cycle six weeks ago; complicated by HUSSEIN, hepatic toxicity, nausea, anorexia, dehydration, pancytopenia. Now more recently course complicated by acute cholecystitis s/p lap carmelita 12/24, re-admission for sepsis and anemia.     Plan was to start Pazopanib, however he clinically has not been doing well (fatigue, weakness, poor appetite, pain). Repeat CT 1/25/23 overall with only mild progression/stable disease so continuing to hold off on chemotherapy.     Clinically doing quite well. Completed radiation to splenic mass locally.      Will continue weekly labs and IVF. CT and MD end of March to assess. Continue to hold chemotherapy.      #  Chronic/recurrent mucositis   Ongoing throughout all of treatment. No thrush currently. Continue salt/soda swishes.       # Poor Oral Intake  # Dehydration  # Renal Insufficiency   # Hypokalemia  # Hypophosphatemia  Was requiring IVF 1-2 days/week in [place]. Continues to require this, is scheduled weekly. Finally doing better.     Continue weekly labs and IVF at [place]. Continue potassium and phos supplement.     If he continues to do well could cut back to every other week fluids.       # Acute LFT elevation with hyperbilirubinemia, resolving  # Obstructive cholelithiasis, s/p lap carmelita  He is 3 weeks post lap-carmelita. LFTs continue to improve.     # Chemo-related pancytopenia, resolved  # Acute/chronic anemia  2/2 chemotherapy. Prior anemia-no bleeding source identified on ERCP, EGD and CT abd. Surgery felt unlikely related to post-op loss following lap carmelita. Stabilized after pRBC. Holding Celebrex/NSAIDs. Could have been related to acute infectious process/reactive with sepsis. Now  likely ongoing with progressive malignancy.      Hgb stable at 9.6. Continue weekly monitoring and transfuse if hgb <8.       # Cancer related pain  Continue Oxycodone 5-10mg every 4 hours PRN. Continue topical lidocaine. Avoid NSAIDs. Palliative involved. Completed radiation.      # Hypopituitarism  # Hypothyroidism  Continues Hydrocortisone and Synthroid-confirmed he is taking. Stress doses hydrocortisone with acute illness. Follows with Endo. TSH WNL     #   Nausea, dyspepsia  Continue Zofran and Compazine. Now also on Zyprexa and notes improved appetite. Tums PRN.      #   Hoarseness  Likely secondary to mediastinal mass. Status post vocal cord injection in July with ENT     #  Depression/anxiety  Restarted on Lexapro by palliative. Mood brighter today. QTc WNL.    # HTN  Stopped lisinopril for possible contribution to elevated creat and switched to Amlodipine 5mg daily. Creat improved, BP stable.    Now had to stop Amlodipine for edema and rash, resolved. Pending BP trend may need to reach out to PCP for management.      # Leukocytosis  Possibly 2/2 steroids (was on prednisone burst for rash). Monitor.     15 minutes spent on the date of the encounter doing chart review, review of test results, interpretation of tests, patient visit and documentation     Maynor Rios PA-C  Department of Hematology and Oncology  [facility]         Again, thank you for allowing me to participate in the care of your patient.        Sincerely,        GERALDO Mullins

## 2023-03-07 NOTE — NURSING NOTE
Is the patient currently in the state of [place]? YES    Visit mode:VIDEO    If the visit is dropped, the patient can be reconnected by: VIDEO VISIT: Text to cell phone: [phone]    Will anyone else be joining the visit? NO      How would you like to obtain your AVS? MyChart    Are changes needed to the allergy or medication list? NO    Reason for visit: Ifrah Brookson 72 year old male presents today for f/u on Sarcoma.  Evy Morris, Virtual Facilitator

## 2023-03-07 NOTE — LETTER
3/7/2023         RE: Ifrah Huitron  [address] [address] [facility] [address]        Dear Colleague,    Thank you for referring your patient, Ifrah Huitron, to the [facility]. Please see a copy of my visit note below.    Video-Visit Details    Type of service:  Video Visit    Video Start Time (time video started): 11:30am    Video End Time (time video stopped): 11:45am    Originating Location (pt. 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 History as follows:  - March 2021 presented with chest pain and back pain, imaging with lung mets and biopsy of mediastinal mass consistent with spindle cell sarcoma with high PD-L1. Baseline imaging lung mets, left sided subdiaphragmatic mass, liver lesions.   - June 2021-October 2021 Pembrolizumab, tolerated well, stopped due to disease progression  - October 2021-February 2022 Doxil + Ifos, not tolerated well, required multiple treatment delays, IVF support, dose reductions, horrible mucositis. Stopped due to tolerance  - March 2022-August 2022 Doxil alone, not tolerated well with ongoing skin and mouth toxicity, requiring delays and dose reductions. Stopped due to disease progression  - August 2022-November 2022 Gemzar alone, not tolerated well with cytopenias and edema, stopped due to disease progression  - November 2022 Trabectedin, only had one cycle. Horrible toxicity with HUSSEIN, hepatic toxicity, cytopenias, required hospital admission. Stopped due to poor tolerance and disease progression  -December 2022 Admission for acute cholecystitis s/p lap carmelita 12/24/22, repeat admission after discharge for sepsis and pain     Plan to start  "Pazopanib as next line therapy for sarcoma, however haven't been able to start due to ongoing fatigue, fevers (ED visit 1/10/23 discharged on Levaquin), and poorly controlled pain.     CT 1/25/23 with slight progression of splenic mass, otherwise stable disease. As such opted to do radiation to mass and hold off on chemotherapy for the time.     Interval History:  Ifrah is overall doing well. His rash resolved with stopping amlodipine and doing prednisone. He did have a bad morning on Saturday for unclear reasons (fatigued, weak, dizzy) but this resolved. He does note increased urination after his IVF appointment. Swelling better. Nausea minimal. No fevers or respiratory concerns. Pain well controlled. Taste continues to be off. Overall he is happy with how well he is doing.    Current Outpatient Medications   Medication Sig Dispense Refill     acetaminophen (TYLENOL) 325 MG tablet Take 2 tablets (650 mg) by mouth every 6 hours as needed for mild pain or other (and adjunct with moderate or severe pain or per patient request)       amLODIPine (NORVASC) 5 MG tablet Take 1 tablet (5 mg) by mouth daily 30 tablet 0     aspirin-acetaminophen-caffeine (EXCEDRIN MIGRAINE) 250-250-65 MG tablet Take 1 tablet by mouth daily as needed for headaches       doxepin (SINEQUAN) 10 MG/ML (HIGH CONC) solution Take 2.5 mLs (25 mg) by mouth 2 times daily as needed (rinse for mucositis) Dilute the 2.5 mL of doxepin to 5 ml total in sterile or distilled water. 118 mL 0     escitalopram (LEXAPRO) 10 MG tablet Take 1 tablet (10 mg) by mouth daily 30 tablet 0     guaiFENesin-codeine (GUAIFENESIN AC) 100-10 MG/5ML syrup Take 10 mLs by mouth every 4 hours as needed for cough 118 mL 0     hydrocortisone (CORTEF) 10 MG tablet Take 20 mg in the morning and 10 mg in the afternoon 270 tablet 2     Insulin Syringe-Needle U-100 27.5G X 5/8\" 2 ML MISC 1 each daily as needed (with solucortef for adrenal crisis) 10 each 1     levothyroxine " (SYNTHROID/LEVOTHROID) 75 MCG tablet Take one tab p.o. 6 days per week and 0.5 tabs one day per week ( total of 6.5 tablets weekly) 90 tablet 2     lidocaine (XYLOCAINE) 5 % external ointment Apply topically 4 times daily as needed for moderate pain (4-6) 240 g 1     OLANZapine (ZYPREXA) 5 MG tablet Take 1 tablet (5 mg) by mouth 2 times daily as needed 60 tablet 1     ondansetron (ZOFRAN) 4 MG tablet Take 1-2 tablets (4-8 mg) by mouth every 8 hours as needed for nausea 60 tablet 3     oxyCODONE (ROXICODONE) 5 MG tablet Take 1-2 tablets (5-10 mg) by mouth every 4 hours as needed for moderate to severe pain 100 tablet 0     phosphorus tablet 250 mg (PHOSPHA 250 NEUTRAL) 250 MG per tablet Take 1 tablet (250 mg) by mouth daily 90 tablet 3     potassium chloride ER (KLOR-CON M) 20 MEQ CR tablet Take 1 tablet (20 mEq) by mouth daily 90 tablet 3     predniSONE (DELTASONE) 20 MG tablet Take 40mg x 2 days, then 20mg x 2 days 6 tablet 0     SUMAtriptan (IMITREX) 50 MG tablet Take 1 tablet (50 mg) by mouth at onset of headache for migraine May repeat in 2 hours. Max 4 tablets/24 hours. 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     ENDOSCOPIC RETROGRADE CHOLANGIOPANCREATOGRAM N/A 12/23/2022    Procedure: ENDOSCOPIC RETROGRADE CHOLANGIOPANCREATOGRAPHY;  Surgeon: Jim Lamar MD;  Location: [facility] OR     ENT SURGERY       ESOPHAGOSCOPY, GASTROSCOPY, DUODENOSCOPY (EGD), COMBINED N/A 12/27/2022    Procedure: ESOPHAGOGASTRODUODENOSCOPY (EGD);  Surgeon: Brenton Francois MD;  Location: [facility] GI     HERNIA REPAIR       INSERT PORT VASCULAR ACCESS Right 1/28/2022    Procedure:  INSERTION, VASCULAR ACCESS PORT;  Surgeon: Daniel Kinney MD;  Location: [facility] OR     IR CHEST PORT PLACEMENT > 5 YRS OF AGE  1/28/2022     LAPAROSCOPIC CHOLECYSTECTOMY N/A 12/24/2022    Procedure: CHOLECYSTECTOMY, LAPAROSCOPIC;  Surgeon: Froy More DO;  Location: [facility] OR     LARYNGOSCOPY, EXCISE VOCAL CORD LESION MICROSCOPIC, COMBINED Left 07/01/2021    Procedure: MICROLARYNGOSCOPY, LEFT TRUE VOCAL CORD INJECTION WITH PROLARYN;  Surgeon: Kyree Bearden MD;  Location: [facility] OR     PHACOEMULSIFICATION WITH STANDARD INTRAOCULAR LENS IMPLANT Right 03/10/2021    Procedure: Cataract removal with implant.;  Surgeon: Jamir Mac MD;  Location: [facility] OR     PHACOEMULSIFICATION WITH STANDARD INTRAOCULAR LENS IMPLANT Left 04/05/2021    Procedure: Cataract removal with implant.;  Surgeon: Jamir Mac MD;  Location: [facility] OR     PICC DOUBLE LUMEN PLACEMENT Right 12/01/2021    5FR DL PICC, basilic vein. L-38cm, 1cm out.     PICC DOUBLE LUMEN PLACEMENT Right 01/04/2022    Right cephalic, 41 cm, 1 external length     PITUITARY EXCISION       tooth pulled 4/7  Right      Allergies   Allergen Reactions     Penicillins Hives and Swelling     Occurred as small child   Pt tolerated meropenem 12/23/22       Social History   Social History     Tobacco Use     Smoking status: Never     Smokeless tobacco: Never   Substance Use Topics     Alcohol use: Yes     Comment: rare     Drug use: Never      Past medical history and social history were reviewed.    Physical Examination:  There were no vitals taken for this visit.  Wt Readings from Last 10 Encounters:   03/01/23 66.5 kg (146 lb 9.6 oz)   02/15/23 66.5 kg (146 lb 9.6 oz)   02/06/23 63.3 kg (139 lb 9.6 oz)   01/26/23 59.3 kg (130 lb 12.8 oz)   01/19/23 59.5 kg (131 lb 3.2 oz)   01/10/23 63.5 kg (140 lb)   01/05/23 61.5 kg (135 lb 8 oz)   12/27/22 65 kg (143 lb 6.4 oz)   12/26/22 62.1 kg (137 lb)   12/22/22 62.5 kg (137 lb 12.8 oz)     Video  physical exam  General: Patient appears well in no acute distress.   Skin: No visualized rash or lesions on visualized skin  Eyes: EOMI, no erythema, sclera icterus or discharge noted  Resp: Appears to be breathing comfortably without accessory muscle usage, speaking in full sentences, no cough  MSK: Appears to have normal range of motion based on visualized movements  Neurologic: No apparent tremors, facial movements symmetric  Psych: affect normal, alert and oriented    Laboratory Data:   Latest Reference Range & Units 03/02/23 10:03   Sodium 136 - 145 mmol/L 143   Potassium 3.4 - 5.3 mmol/L 3.6   Chloride 98 - 107 mmol/L 104   Carbon Dioxide (CO2) 22 - 29 mmol/L 25   Urea Nitrogen 8.0 - 23.0 mg/dL 20.6   Creatinine 0.67 - 1.17 mg/dL 1.09   GFR Estimate >60 mL/min/1.73m2 71   Calcium 8.8 - 10.2 mg/dL 9.4   Anion Gap 7 - 15 mmol/L 14   Magnesium 1.7 - 2.3 mg/dL 2.1   Phosphorus 2.5 - 4.5 mg/dL 2.4 (L)   Albumin 3.5 - 5.2 g/dL 3.5   Protein Total 6.4 - 8.3 g/dL 6.4   Alkaline Phosphatase 40 - 129 U/L 203 (H)   ALT 10 - 50 U/L 23   AST 10 - 50 U/L 22   Bilirubin Total <=1.2 mg/dL 0.2   Glucose 70 - 99 mg/dL 185 (H)   WBC 4.0 - 11.0 10e3/uL 14.0 (H)   Hemoglobin 13.3 - 17.7 g/dL 9.6 (L)   Hematocrit 40.0 - 53.0 % 30.4 (L)   Platelet Count 150 - 450 10e3/uL 193   RBC Count 4.40 - 5.90 10e6/uL 3.03 (L)   MCV 78 - 100 fL 100   MCH 26.5 - 33.0 pg 31.7   MCHC 31.5 - 36.5 g/dL 31.6   RDW 10.0 - 15.0 % 15.7 (H)   % Neutrophils % 90   % Lymphocytes % 7   % Monocytes % 1   % Eosinophils % 0   % Basophils % 0   % Metamyelocytes % 2   Absolute Basophils 0.0 - 0.2 10e3/uL 0.0   Absolute Neutrophil 1.6 - 8.3 10e3/uL 12.6 (H)   Absolute Lymphocytes 0.8 - 5.3 10e3/uL 1.0   Absolute Monocytes 0.0 - 1.3 10e3/uL 0.1   Absolute Eosinophils 0.0 - 0.7 10e3/uL 0.0   Absolute Metamyelocytes <=0.0 10e3/uL 0.3 (H)   RBC Morphology  Confirmed RBC Indices   Platelet Morphology Automated Count Confirmed. Platelet morphology is normal.  Automated  Count Confirmed. Platelet morphology is normal.   (L): Data is abnormally low  (H): Data is abnormally high      Assessment and Plan:  #  Metastatic spindle cell sarcoma  S/p progression on multiple lines of therapy.  Has not tolerated prior chemotherapy well. Most recently received Trabectedin x 1 cycle six weeks ago; complicated by HUSSEIN, hepatic toxicity, nausea, anorexia, dehydration, pancytopenia. Now more recently course complicated by acute cholecystitis s/p lap carmelita 12/24, re-admission for sepsis and anemia.     Plan was to start Pazopanib, however he clinically has not been doing well (fatigue, weakness, poor appetite, pain). Repeat CT 1/25/23 overall with only mild progression/stable disease so continuing to hold off on chemotherapy.     Clinically doing quite well. Completed radiation to splenic mass locally.      Will continue weekly labs and IVF. CT and MD end of March to assess. Continue to hold chemotherapy.      #  Chronic/recurrent mucositis   Ongoing throughout all of treatment. No thrush currently. Continue salt/soda swishes.       # Poor Oral Intake  # Dehydration  # Renal Insufficiency   # Hypokalemia  # Hypophosphatemia  Was requiring IVF 1-2 days/week in [place]. Continues to require this, is scheduled weekly. Finally doing better.     Continue weekly labs and IVF at [place]. Continue potassium and phos supplement.     If he continues to do well could cut back to every other week fluids.       # Acute LFT elevation with hyperbilirubinemia, resolving  # Obstructive cholelithiasis, s/p lap carmelita  He is 3 weeks post lap-carmelita. LFTs continue to improve.     # Chemo-related pancytopenia, resolved  # Acute/chronic anemia  2/2 chemotherapy. Prior anemia-no bleeding source identified on ERCP, EGD and CT abd. Surgery felt unlikely related to post-op loss following lap carmelita. Stabilized after pRBC. Holding Celebrex/NSAIDs. Could have been related to acute infectious process/reactive with sepsis. Now  likely ongoing with progressive malignancy.      Hgb stable at 9.6. Continue weekly monitoring and transfuse if hgb <8.       # Cancer related pain  Continue Oxycodone 5-10mg every 4 hours PRN. Continue topical lidocaine. Avoid NSAIDs. Palliative involved. Completed radiation.      # Hypopituitarism  # Hypothyroidism  Continues Hydrocortisone and Synthroid-confirmed he is taking. Stress doses hydrocortisone with acute illness. Follows with Endo. TSH WNL     #   Nausea, dyspepsia  Continue Zofran and Compazine. Now also on Zyprexa and notes improved appetite. Tums PRN.      #   Hoarseness  Likely secondary to mediastinal mass. Status post vocal cord injection in July with ENT     #  Depression/anxiety  Restarted on Lexapro by palliative. Mood brighter today. QTc WNL.    # HTN  Stopped lisinopril for possible contribution to elevated creat and switched to Amlodipine 5mg daily. Creat improved, BP stable.    Now had to stop Amlodipine for edema and rash, resolved. Pending BP trend may need to reach out to PCP for management.      # Leukocytosis  Possibly 2/2 steroids (was on prednisone burst for rash). Monitor.     15 minutes spent on the date of the encounter doing chart review, review of test results, interpretation of tests, patient visit and documentation     Maynor Rios PA-C  Department of Hematology and Oncology  [facility]         Again, thank you for allowing me to participate in the care of your patient.        Sincerely,        GERALDO Mullins

## 2023-03-09 NOTE — NURSING NOTE
"Infusion Nursing Note:  Ifrah Huitron presents today for fluids and labs.    Patient seen by provider today: No   present during visit today: Not Applicable.    Note:     Intravenous Access:  Labs drawn without difficulty from port access.  Implanted Port accessed with brisk blood return.  Patient alerted to this nurse he does not have his infusion over 2 hours, he always has at 999 rate and over 1 hour.  Discussed effects, continue to decline 2 hours..  Also discussed increased BP.  Patient reports he recently was taken off his blood pressure medication and will talk to ordering provider to update.    Treatment Conditions:  Lab Results   Component Value Date    HGB 9.7 (L) 03/09/2023    WBC 12.4 (H) 03/09/2023    ANEU 12.6 (H) 03/02/2023    ANEUTAUTO 9.6 (H) 03/09/2023     03/09/2023        Post Infusion Assessment:  Patient tolerated infusion without incident.  Blood return noted pre and post infusion.  Site patent and intact, free from redness, edema or discomfort.  Access discontinued per protocol.   Noted BP remained increased, even with manual cuff.  Patient stated \" I feel great\". Patient states he does have his BP pressure medication at his cabin, he did forget his cuff, and will phone his provider to question restarting medication.  Educated on side effects and with any changes in condition, he needs to seek medical attention.  Patient stated he was 10 miles from [facility] and will go there.    Discharge Plan:   Discharge instructions reviewed with: Patient.  Patient and/or family verbalized understanding of discharge instructions and all questions answered.  Copy of AVS reviewed with patient and/or family.  Patient will return as needed for next appointment.  Patient discharged in stable condition accompanied by: self.  Departure Mode: Ambulatory.      Marisela Dee RN                      "

## 2023-03-13 NOTE — TELEPHONE ENCOUNTER
3/13/23    Called patient to follow-up on labs last week. Had more pain from the drive home but able to control. He felt like the fluids last week was helpful. TSH was off, but T4 WNL. Will recheck in 2 weeks.    BP was high at the infusion center. He will monitor at home and if still high will forward to cardiology.     Maynor Rios PA-C

## 2023-03-16 NOTE — PROGRESS NOTES
Infusion Nursing Note:  Ifrah Huitron presents today for IV fluids.    Patient seen by provider today: No   present during visit today: Not Applicable.    Note: N/A.    Intravenous Access:  Implanted Port.    Treatment Conditions:  Lab Results   Component Value Date     03/16/2023    POTASSIUM 4.1 03/16/2023    MAG 2.1 03/16/2023    CR 1.44 (H) 03/16/2023    COTY 9.4 03/16/2023    BILITOTAL 0.3 03/16/2023    ALBUMIN 3.5 03/16/2023    ALT 25 03/16/2023    AST 23 03/16/2023       Post Infusion Assessment:  Patient tolerated infusion without incident.  Access discontinued per protocol.     Discharge Plan:   AVS to patient via MYCHART.  Patient will return next week for next infusion appt.  Patient discharged in stable condition accompanied by: self.  Departure Mode: Ambulatory.      Deepika Quach RN

## 2023-03-22 NOTE — TELEPHONE ENCOUNTER
Oral Chemotherapy Monitoring Program     Placed call to patient in follow up of oral chemotherapy. The purpose of the call was to review side effects and how to take pazopanib. Mr. Huitron was initially taught on pazopanib at the end of December, but doesn't remember much about the medication. Per Gabriel, he is ready to start pazopanib on 3/27/23. When I called to review side effects, his wife had gone to the store. I gave him our phone number so he can call back with his wife at their earliest convenience.    Evy Chavez, PharmD, BCOP  Oral Chemotherapy Monitoring Program  [facility]   [phone]

## 2023-03-22 NOTE — PROGRESS NOTES
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 History as follows:  - March 2021 presented with chest pain and back pain, imaging with lung mets and biopsy of mediastinal mass consistent with spindle cell sarcoma with high PD-L1. Baseline imaging lung mets, left sided subdiaphragmatic mass, liver lesions.   - June 2021-October 2021 Pembrolizumab, tolerated well, stopped due to disease progression  - October 2021-February 2022 Doxil + Ifos, not tolerated well, required multiple treatment delays, IVF support, dose reductions, horrible mucositis. Stopped due to tolerance  - March 2022-August 2022 Doxil alone, not tolerated well with ongoing skin and mouth toxicity, requiring delays and dose reductions. Stopped due to disease progression  - August 2022-November 2022 Gemzar alone, not tolerated well with cytopenias and edema, stopped due to disease progression  - November 2022 Trabectedin, only had one cycle. Horrible toxicity with HUSSEIN, hepatic toxicity, cytopenias, required hospital admission. Stopped due to poor tolerance and disease progression  -December 2022 Admission for acute cholecystitis s/p lap carmelita 12/24/22, repeat admission after discharge for sepsis and pain     Plan to start Pazopanib as next line therapy for sarcoma, however haven't been able to start due to ongoing fatigue, fevers (ED visit 1/10/23 discharged on Levaquin), and poorly controlled pain.     CT 1/25/23 with slight progression of splenic mass, otherwise stable disease. As such opted to do radiation to mass and hold off on chemotherapy for the time.     He has continued off treatment. CT 3/17/23 with disease progression. Now planning on starting Pazopanib.     Interval History:  Ifrah is overall doing OK. He has had issues with worsening pain in the AM that makes him feel weak.  "This improves after he takes Oxycodone and applies lidocaine. Taste continues to be an issue and he worries he isn't doing as well with eating and drinking. He has mild nausea, managed with antiemetics. No bowel or bladder concerns. No fevers. BP has been generally stable off meds. No rashes.     Current Outpatient Medications   Medication Sig Dispense Refill     acetaminophen (TYLENOL) 325 MG tablet Take 2 tablets (650 mg) by mouth every 6 hours as needed for mild pain or other (and adjunct with moderate or severe pain or per patient request)       aspirin-acetaminophen-caffeine (EXCEDRIN MIGRAINE) 250-250-65 MG tablet Take 1 tablet by mouth daily as needed for headaches       doxepin (SINEQUAN) 10 MG/ML (HIGH CONC) solution Take 2.5 mLs (25 mg) by mouth 2 times daily as needed (rinse for mucositis) Dilute the 2.5 mL of doxepin to 5 ml total in sterile or distilled water. (Patient not taking: Reported on 3/9/2023) 118 mL 0     escitalopram (LEXAPRO) 10 MG tablet Take 1 tablet (10 mg) by mouth daily 30 tablet 0     guaiFENesin-codeine (GUAIFENESIN AC) 100-10 MG/5ML syrup Take 10 mLs by mouth every 4 hours as needed for cough 118 mL 0     hydrocortisone (CORTEF) 10 MG tablet Take 20 mg in the morning and 10 mg in the afternoon 270 tablet 2     Insulin Syringe-Needle U-100 27.5G X 5/8\" 2 ML MISC 1 each daily as needed (with solucortef for adrenal crisis) 10 each 1     levothyroxine (SYNTHROID/LEVOTHROID) 75 MCG tablet Take one tab p.o. 6 days per week and 0.5 tabs one day per week ( total of 6.5 tablets weekly) 90 tablet 2     lidocaine (XYLOCAINE) 5 % external ointment Apply topically 4 times daily as needed for moderate pain (4-6) 240 g 1     OLANZapine (ZYPREXA) 5 MG tablet Take 1 tablet (5 mg) by mouth 2 times daily as needed 60 tablet 1     ondansetron (ZOFRAN) 4 MG tablet Take 1-2 tablets (4-8 mg) by mouth every 8 hours as needed for nausea 60 tablet 3     oxyCODONE (ROXICODONE) 5 MG tablet Take 1-2 tablets " (5-10 mg) by mouth every 4 hours as needed for moderate to severe pain 100 tablet 0     phosphorus tablet 250 mg (PHOSPHA 250 NEUTRAL) 250 MG per tablet Take 1 tablet (250 mg) by mouth daily 90 tablet 3     potassium chloride ER (KLOR-CON M) 20 MEQ CR tablet Take 1 tablet (20 mEq) by mouth daily 90 tablet 3     SUMAtriptan (IMITREX) 50 MG tablet Take 1 tablet (50 mg) by mouth at onset of headache for migraine May repeat in 2 hours. Max 4 tablets/24 hours. 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     Thyroid disease     removed pituitary gland     Past Surgical History:   Procedure Laterality Date     COLONOSCOPY N/A 12/17/2020    Procedure: COLONOSCOPY;  Surgeon: Ken Camacho MD;  Location: [facility]     ENDOSCOPIC RETROGRADE CHOLANGIOPANCREATOGRAM N/A 12/23/2022    Procedure: ENDOSCOPIC RETROGRADE CHOLANGIOPANCREATOGRAPHY;  Surgeon: Jim Lamar MD;  Location: [facility] [place] [facility]     ENT SURGERY       ESOPHAGOSCOPY, GASTROSCOPY, DUODENOSCOPY (EGD), COMBINED N/A 12/27/2022    Procedure: ESOPHAGOGASTRODUODENOSCOPY (EGD);  Surgeon: Brenton Francois MD;  Location: [facility]     HERNIA REPAIR       INSERT PORT VASCULAR ACCESS Right 1/28/2022    Procedure: INSERTION, VASCULAR ACCESS PORT;  Surgeon: Daniel Kinney MD;  Location: [facility] [place]     IR CHEST PORT PLACEMENT > 5 YRS OF AGE  1/28/2022     LAPAROSCOPIC CHOLECYSTECTOMY N/A 12/24/2022    Procedure: CHOLECYSTECTOMY, LAPAROSCOPIC;  Surgeon: Froy More DO;  Location: [facility] [place] [facility]     LARYNGOSCOPY, EXCISE VOCAL CORD LESION MICROSCOPIC, COMBINED Left 07/01/2021    Procedure: MICROLARYNGOSCOPY, LEFT TRUE VOCAL CORD INJECTION WITH PROLARYN;  Surgeon: Kyree Bearden MD;  Location: [facility]     PHACOEMULSIFICATION WITH STANDARD INTRAOCULAR LENS IMPLANT Right 03/10/2021     Procedure: Cataract removal with implant.;  Surgeon: Jamir Mac MD;  Location: [facility]     PHACOEMULSIFICATION WITH STANDARD INTRAOCULAR LENS IMPLANT Left 04/05/2021    Procedure: Cataract removal with implant.;  Surgeon: Jamir Mac MD;  Location: [facility]     PICC DOUBLE LUMEN PLACEMENT Right 12/01/2021    5FR DL PICC, basilic vein. L-38cm, 1cm out.     PICC DOUBLE LUMEN PLACEMENT Right 01/04/2022    Right cephalic, 41 cm, 1 external length     PITUITARY EXCISION       tooth pulled 4/7  Right      Allergies   Allergen Reactions     Penicillins Hives and Swelling     Occurred as small child   Pt tolerated meropenem 12/23/22       Social History   Social History     Tobacco Use     Smoking status: Never     Smokeless tobacco: Never   Substance Use Topics     Alcohol use: Yes     Comment: rare     Drug use: Never      Past medical history and social history were reviewed.    Physical Examination:  There were no vitals taken for this visit.  Wt Readings from Last 10 Encounters:   03/01/23 66.5 kg (146 lb 9.6 oz)   02/15/23 66.5 kg (146 lb 9.6 oz)   02/06/23 63.3 kg (139 lb 9.6 oz)   01/26/23 59.3 kg (130 lb 12.8 oz)   01/19/23 59.5 kg (131 lb 3.2 oz)   01/10/23 63.5 kg (140 lb)   01/05/23 61.5 kg (135 lb 8 oz)   12/27/22 65 kg (143 lb 6.4 oz)   12/26/22 62.1 kg (137 lb)   12/22/22 62.5 kg (137 lb 12.8 oz)     Video physical exam  General: Patient appears well in no acute distress.   Skin: No visualized rash or lesions on visualized skin  Eyes: EOMI, no erythema, sclera icterus or discharge noted  Resp: Appears to be breathing comfortably without accessory muscle usage, speaking in full sentences, no cough  MSK: Appears to have normal range of motion based on visualized movements  Neurologic: No apparent tremors, facial movements symmetric  Psych: affect normal, alert and oriented    Laboratory Data:   Latest Reference Range & Units 03/16/23 10:00   Sodium 136 - 145 mmol/L 139   Potassium 3.4 -  5.3 mmol/L 4.1   Chloride 98 - 107 mmol/L 103   Carbon Dioxide (CO2) 22 - 29 mmol/L 27   Urea Nitrogen 8.0 - 23.0 mg/dL 28.3 (H)   Creatinine 0.67 - 1.17 mg/dL 1.44 (H)   GFR Estimate >60 mL/min/1.73m2 51 (L)   Calcium 8.8 - 10.2 mg/dL 9.4   Anion Gap 7 - 15 mmol/L 9   Magnesium 1.7 - 2.3 mg/dL 2.1   Phosphorus 2.5 - 4.5 mg/dL 3.2   Albumin 3.5 - 5.2 g/dL 3.5   Protein Total 6.4 - 8.3 g/dL 6.5   Alkaline Phosphatase 40 - 129 U/L 179 (H)   ALT 10 - 50 U/L 25   AST 10 - 50 U/L 23   Bilirubin Total <=1.2 mg/dL 0.3   Glucose 70 - 99 mg/dL 107 (H)   WBC 4.0 - 11.0 10e3/uL 10.7   Hemoglobin 13.3 - 17.7 g/dL 10.8 (L)   Hematocrit 40.0 - 53.0 % 35.4 (L)   Platelet Count 150 - 450 10e3/uL 188   RBC Count 4.40 - 5.90 10e6/uL 3.43 (L)   MCV 78 - 100 fL 103 (H)   MCH 26.5 - 33.0 pg 31.5   MCHC 31.5 - 36.5 g/dL 30.5 (L)   RDW 10.0 - 15.0 % 15.9 (H)   % Neutrophils % 76   % Lymphocytes % 12   % Monocytes % 10   % Eosinophils % 1   % Basophils % 0   Absolute Basophils 0.0 - 0.2 10e3/uL 0.0   Absolute Eosinophils 0.0 - 0.7 10e3/uL 0.1   Absolute Immature Granulocytes <=0.4 10e3/uL 0.2   Absolute Lymphocytes 0.8 - 5.3 10e3/uL 1.2   Absolute Monocytes 0.0 - 1.3 10e3/uL 1.1   % Immature Granulocytes % 1   Absolute Neutrophils 1.6 - 8.3 10e3/uL 8.1   Absolute NRBCs 10e3/uL 0.0   NRBCs per 100 WBC <1 /100 0   (H): Data is abnormally high  (L): Data is abnormally low    CT 3/17/23  FINDINGS:   LUNGS AND PLEURA: A 3 mm noncalcified pulmonary nodule in medial right   lower lobe (5-88) and a subsolid 3 mm pulmonary nodule in the lateral   left lower lobe (5-96) are unchanged. A couple additional tiny 2 mm   pulmonary nodules are unchanged. Recommend attention on surveillance   imaging. No new or enlarging pulmonary nodules. Mild consolidative   volume loss in the left lower lobe base is present, which appears   increased to comparison and is difficult to separate from a previously   seen mass in left upper quadrant which is further  discussed below. No   pneumonic consolidation or pleural fluid. Central airways are patent.     MEDIASTINUM/AXILLAE: The previously seen solid anterior mediastinal   mass is slightly increased in size to most recent comparison measuring   4.0 x 4.0 cm (2-62), previously 3.8 x 2.8 cm. Heart size is normal. No   pericardial effusion. Thoracic aorta is normal in course and caliber.   No new or enlarging thoracic lymph nodes. A right chest wall   Port-A-Cath is present with the tip near the cavoatrial junction.     HEPATOBILIARY: Gallbladder is absent. No bile duct dilation. Mild   pneumobilia is seen. A 10 mm low-density observation in the right   hepatic lobe near the dome is unchanged and is incompletely   characterized, but statistically favors a cyst or hemangioma. If   further evaluation is desired, recommend contrast enhanced liver MRI.     PANCREAS: Normal.     SPLEEN: There has been interval increase in size of a lobulated mass   involving the spleen with extrasplenic extension to invade the left   hemidiaphragm. The mass is difficult to delineate on this unenhanced   study, but is estimated at 10.2 x 9.1 cm (2-121), previously 9.4 x 9.1   cm.     ADRENAL GLANDS: Normal.     KIDNEYS/BLADDER: There is loss of normal flap pains between the upper   pole of the left kidney and the left upper pole mass with subtle area   of low density involving the superior pole of the left renal cortex   (6-66). Similar-appearing suspected bilateral renal cysts. No specific   follow-up is recommended. No nephrolithiasis or hydronephrosis.   Urinary bladder is normal-appearing.     BOWEL: Diverticulosis in the colon. No acute inflammatory change. No   obstruction.     LYMPH NODES: There are several new peritoneal nodules seen in the left   upper quadrant with the largest measuring 11 x 8 mm.     VASCULATURE: Unremarkable.     PELVIC ORGANS: Prostate gland is mildly enlarged.     OTHER: Mild to moderate atherosclerosis of the  abdominal aorta and   iliac arteries. No aneurysmal dilation.     MUSCULOSKELETAL: Mild degenerative changes of the spine. No acute   osseous abnormality. Suspected bone island in the right femoral neck.   Suspected bone island in the left iliac wing. Stable intercostal   calcification between the anterior aspect of the left third and fourth   ribs.    Impression:     IMPRESSION:   1.  Interval progression of disease.   2.  Mild interval increase in size of anterior mediastinal mass.   3.  Several new nodules in the left upper quadrant, favoring   carcinomatosis.   4.  Left splenic mass with extrasplenic extension to involve the left   hemidiaphragm and possible left lower lung base.   5.  Mild left lower lung base consolidative volume loss, which may   represent atelectasis and possible lung involvement by the splenic   mass.   6.  Ill-defined low density in the upper pole of the left kidney as   well as loss of fat planes between the kidney and spleen, consistent   with splenic mass extension to the kidney.   7.  Additional nonacute findings as above.     ROBIN SLATER MD        Assessment and Plan:  #  Metastatic spindle cell sarcoma  S/p progression on multiple lines of therapy.  Has not tolerated prior chemotherapy well. Most recently received Trabectedin x 1 cycle November 2022; complicated by HUSSEIN, hepatic toxicity, nausea, anorexia, dehydration, pancytopenia. Now more recently course complicated by acute cholecystitis s/p lap carmelita 12/24, re-admission for sepsis and anemia.    Has been on chemo holiday since November 2022. Clinically has improved being off treatment, though cancer related pain remains an issue, even after radiation to splenic mass.    CT from 3/17/23 reviewed and he has disease progression. Would recommend starting Pazopanib 3/27/23. Will want to make sure baseline labs from tomorrow are acceptable to start treatment.    Messaged pharmacy to review teaching again.    Will do weekly labs  with IVF. Request EKG on 4/6/23.    Will be meeting with MD on 3/30 as scheduled.      #  Chronic/recurrent mucositis   Ongoing throughout all of treatment. No thrush currently. Continue salt/soda swishes.  Requested ENT follow-up.      # Poor Oral Intake  # Dehydration  # Renal Insufficiency   # Hypokalemia  # Hypophosphatemia  Was requiring IVF 1-2 days/week in [place]. Continues to require this, is scheduled weekly. Doing OK, though recent creat elevated. Monitor.      Continue weekly labs and IVF at [place]. Continue potassium and phos supplement.       # Acute LFT elevation with hyperbilirubinemia, resolving  # Obstructive cholelithiasis, s/p lap carmelita  He is 3 weeks post lap-carmelita. LFTs continue to improve.     # Chemo-related pancytopenia, resolved  # Acute/chronic anemia  2/2 chemotherapy. Prior anemia-no bleeding source identified on ERCP, EGD and CT abd. Surgery felt unlikely related to post-op loss following lap carmelita. Stabilized after pRBC. Holding Celebrex/NSAIDs. Could have been related to acute infectious process/reactive with sepsis. Now likely ongoing with progressive malignancy.      Hgb stable at 10.8. Continue weekly monitoring and transfuse if hgb <8.       # Cancer related pain  Continue Oxycodone 5-10mg every 4 hours PRN. Continue topical lidocaine. Avoid NSAIDs. Palliative involved. Completed radiation. Discussed taking higher dose 10mg Oxycodone at bedtime to hopefully not have such severe pain in AM.      # Hypopituitarism  # Hypothyroidism  Continues Hydrocortisone and Synthroid-confirmed he is taking. Stress doses hydrocortisone with acute illness. Follows with Endo. TSH was low, will need to recheck in coupe weeks.      #   Nausea, dyspepsia  Continue Zofran and Compazine. Now also on Zyprexa and notes improved appetite. Tums PRN.      #   Hoarseness  Likely secondary to mediastinal mass. Status post vocal cord injection in July with ENT     #  Depression/anxiety  Restarted on Lexapro by  palliative. Mood brighter today. QTc WNL. Recheck 4/6 after starting pazopanib.      # HTN  Stopped lisinopril for possible contribution to elevated creat and switched to Amlodipine 5mg daily. Then had rash and swelling so held.    Not currently on any antihypertensives. Reviewed home readings appx 120s/80s. Will continue off meds and monitor.     30 minutes spent on the date of the encounter doing chart review, review of test results, interpretation of tests, patient visit and documentation     Maynor Rios PA-C  Department of Hematology and Oncology  [facility]

## 2023-03-22 NOTE — LETTER
3/22/2023         RE: Ifrah Huitron  [id number] [address] [facility] [address]        Dear Colleague,    Thank you for referring your patient, Ifrah Huitron, to the [facility]. Please see a copy of my visit note below.    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 History as follows:  - March 2021 presented with chest pain and back pain, imaging with lung mets and biopsy of mediastinal mass consistent with spindle cell sarcoma with high PD-L1. Baseline imaging lung mets, left sided subdiaphragmatic mass, liver lesions.   - June 2021-October 2021 Pembrolizumab, tolerated well, stopped due to disease progression  - October 2021-February 2022 Doxil + Ifos, not tolerated well, required multiple treatment delays, IVF support, dose reductions, horrible mucositis. Stopped due to tolerance  - March 2022-August 2022 Doxil alone, not tolerated well with ongoing skin and mouth toxicity, requiring delays and dose reductions. Stopped due to disease progression  - August 2022-November 2022 Gemzar alone, not tolerated well with cytopenias and edema, stopped due to disease progression  - November 2022 Trabectedin, only had one cycle. Horrible toxicity with HUSSEIN, hepatic toxicity, cytopenias, required hospital admission. Stopped due to poor tolerance and disease progression  -December 2022 Admission for acute cholecystitis s/p lap carmelita 12/24/22, repeat admission after discharge for sepsis and pain     Plan to start Pazopanib as next line therapy for sarcoma, however haven't been able to start due to ongoing fatigue, fevers (ED visit 1/10/23 discharged on Levaquin), and poorly controlled pain.     CT 1/25/23 with slight progression of splenic mass, otherwise stable disease. As such opted to do radiation to mass and hold off on  "chemotherapy for the time.     He has continued off treatment. CT 3/17/23 with disease progression. Now planning on starting Pazopanib.     Interval History:  Ifrah is overall doing OK. He has had issues with worsening pain in the AM that makes him feel weak. This improves after he takes Oxycodone and applies lidocaine. Taste continues to be an issue and he worries he isn't doing as well with eating and drinking. He has mild nausea, managed with antiemetics. No bowel or bladder concerns. No fevers. BP has been generally stable off meds. No rashes.     Current Outpatient Medications   Medication Sig Dispense Refill     acetaminophen (TYLENOL) 325 MG tablet Take 2 tablets (650 mg) by mouth every 6 hours as needed for mild pain or other (and adjunct with moderate or severe pain or per patient request)       aspirin-acetaminophen-caffeine (EXCEDRIN MIGRAINE) 250-250-65 MG tablet Take 1 tablet by mouth daily as needed for headaches       doxepin (SINEQUAN) 10 MG/ML (HIGH CONC) solution Take 2.5 mLs (25 mg) by mouth 2 times daily as needed (rinse for mucositis) Dilute the 2.5 mL of doxepin to 5 ml total in sterile or distilled water. (Patient not taking: Reported on 3/9/2023) 118 mL 0     escitalopram (LEXAPRO) 10 MG tablet Take 1 tablet (10 mg) by mouth daily 30 tablet 0     guaiFENesin-codeine (GUAIFENESIN AC) 100-10 MG/5ML syrup Take 10 mLs by mouth every 4 hours as needed for cough 118 mL 0     hydrocortisone (CORTEF) 10 MG tablet Take 20 mg in the morning and 10 mg in the afternoon 270 tablet 2     Insulin Syringe-Needle U-100 27.5G X 5/8\" 2 ML MISC 1 each daily as needed (with solucortef for adrenal crisis) 10 each 1     levothyroxine (SYNTHROID/LEVOTHROID) 75 MCG tablet Take one tab p.o. 6 days per week and 0.5 tabs one day per week ( total of 6.5 tablets weekly) 90 tablet 2     lidocaine (XYLOCAINE) 5 % external ointment Apply topically 4 times daily as needed for moderate pain (4-6) 240 g 1     OLANZapine (ZYPREXA) " 5 MG tablet Take 1 tablet (5 mg) by mouth 2 times daily as needed 60 tablet 1     ondansetron (ZOFRAN) 4 MG tablet Take 1-2 tablets (4-8 mg) by mouth every 8 hours as needed for nausea 60 tablet 3     oxyCODONE (ROXICODONE) 5 MG tablet Take 1-2 tablets (5-10 mg) by mouth every 4 hours as needed for moderate to severe pain 100 tablet 0     phosphorus tablet 250 mg (PHOSPHA 250 NEUTRAL) 250 MG per tablet Take 1 tablet (250 mg) by mouth daily 90 tablet 3     potassium chloride ER (KLOR-CON M) 20 MEQ CR tablet Take 1 tablet (20 mEq) by mouth daily 90 tablet 3     SUMAtriptan (IMITREX) 50 MG tablet Take 1 tablet (50 mg) by mouth at onset of headache for migraine May repeat in 2 hours. Max 4 tablets/24 hours. 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     Thyroid disease     removed pituitary gland     Past Surgical History:   Procedure Laterality Date     COLONOSCOPY N/A 12/17/2020    Procedure: COLONOSCOPY;  Surgeon: Ken Camacho MD;  Location: [facility]     ENDOSCOPIC RETROGRADE CHOLANGIOPANCREATOGRAM N/A 12/23/2022    Procedure: ENDOSCOPIC RETROGRADE CHOLANGIOPANCREATOGRAPHY;  Surgeon: Jim Lamar MD;  Location: [facility] OR     ENT SURGERY       ESOPHAGOSCOPY, GASTROSCOPY, DUODENOSCOPY (EGD), COMBINED N/A 12/27/2022    Procedure: ESOPHAGOGASTRODUODENOSCOPY (EGD);  Surgeon: Brenton Francois MD;  Location: [facility] GI     HERNIA REPAIR       INSERT PORT VASCULAR ACCESS Right 1/28/2022    Procedure: INSERTION, VASCULAR ACCESS PORT;  Surgeon: Daniel Kinney MD;  Location: [facility] OR     IR CHEST PORT PLACEMENT > 5 YRS OF AGE  1/28/2022     LAPAROSCOPIC CHOLECYSTECTOMY N/A 12/24/2022    Procedure: CHOLECYSTECTOMY, LAPAROSCOPIC;  Surgeon: Froy More DO;  Location: [facility] OR     LARYNGOSCOPY, EXCISE VOCAL CORD LESION  MICROSCOPIC, COMBINED Left 07/01/2021    Procedure: MICROLARYNGOSCOPY, LEFT TRUE VOCAL CORD INJECTION WITH PROLARYN;  Surgeon: Kyree Bearden MD;  Location: [facility] OR     PHACOEMULSIFICATION WITH STANDARD INTRAOCULAR LENS IMPLANT Right 03/10/2021    Procedure: Cataract removal with implant.;  Surgeon: Jamir Mac MD;  Location: [facility] OR     PHACOEMULSIFICATION WITH STANDARD INTRAOCULAR LENS IMPLANT Left 04/05/2021    Procedure: Cataract removal with implant.;  Surgeon: Jamir Mac MD;  Location: [facility] OR     PICC DOUBLE LUMEN PLACEMENT Right 12/01/2021    5FR DL PICC, basilic vein. L-38cm, 1cm out.     PICC DOUBLE LUMEN PLACEMENT Right 01/04/2022    Right cephalic, 41 cm, 1 external length     PITUITARY EXCISION       tooth pulled 4/7  Right      Allergies   Allergen Reactions     Penicillins Hives and Swelling     Occurred as small child   Pt tolerated meropenem 12/23/22       Social History   Social History     Tobacco Use     Smoking status: Never     Smokeless tobacco: Never   Substance Use Topics     Alcohol use: Yes     Comment: rare     Drug use: Never      Past medical history and social history were reviewed.    Physical Examination:  There were no vitals taken for this visit.  Wt Readings from Last 10 Encounters:   03/01/23 66.5 kg (146 lb 9.6 oz)   02/15/23 66.5 kg (146 lb 9.6 oz)   02/06/23 63.3 kg (139 lb 9.6 oz)   01/26/23 59.3 kg (130 lb 12.8 oz)   01/19/23 59.5 kg (131 lb 3.2 oz)   01/10/23 63.5 kg (140 lb)   01/05/23 61.5 kg (135 lb 8 oz)   12/27/22 65 kg (143 lb 6.4 oz)   12/26/22 62.1 kg (137 lb)   12/22/22 62.5 kg (137 lb 12.8 oz)     Video physical exam  General: Patient appears well in no acute distress.   Skin: No visualized rash or lesions on visualized skin  Eyes: EOMI, no erythema, sclera icterus or discharge noted  Resp: Appears to be breathing comfortably without accessory muscle usage, speaking in full sentences, no cough  MSK: Appears to have normal range of motion based  on visualized movements  Neurologic: No apparent tremors, facial movements symmetric  Psych: affect normal, alert and oriented    Laboratory Data:   Latest Reference Range & Units 03/16/23 10:00   Sodium 136 - 145 mmol/L 139   Potassium 3.4 - 5.3 mmol/L 4.1   Chloride 98 - 107 mmol/L 103   Carbon Dioxide (CO2) 22 - 29 mmol/L 27   Urea Nitrogen 8.0 - 23.0 mg/dL 28.3 (H)   Creatinine 0.67 - 1.17 mg/dL 1.44 (H)   GFR Estimate >60 mL/min/1.73m2 51 (L)   Calcium 8.8 - 10.2 mg/dL 9.4   Anion Gap 7 - 15 mmol/L 9   Magnesium 1.7 - 2.3 mg/dL 2.1   Phosphorus 2.5 - 4.5 mg/dL 3.2   Albumin 3.5 - 5.2 g/dL 3.5   Protein Total 6.4 - 8.3 g/dL 6.5   Alkaline Phosphatase 40 - 129 U/L 179 (H)   ALT 10 - 50 U/L 25   AST 10 - 50 U/L 23   Bilirubin Total <=1.2 mg/dL 0.3   Glucose 70 - 99 mg/dL 107 (H)   WBC 4.0 - 11.0 10e3/uL 10.7   Hemoglobin 13.3 - 17.7 g/dL 10.8 (L)   Hematocrit 40.0 - 53.0 % 35.4 (L)   Platelet Count 150 - 450 10e3/uL 188   RBC Count 4.40 - 5.90 10e6/uL 3.43 (L)   MCV 78 - 100 fL 103 (H)   MCH 26.5 - 33.0 pg 31.5   MCHC 31.5 - 36.5 g/dL 30.5 (L)   RDW 10.0 - 15.0 % 15.9 (H)   % Neutrophils % 76   % Lymphocytes % 12   % Monocytes % 10   % Eosinophils % 1   % Basophils % 0   Absolute Basophils 0.0 - 0.2 10e3/uL 0.0   Absolute Eosinophils 0.0 - 0.7 10e3/uL 0.1   Absolute Immature Granulocytes <=0.4 10e3/uL 0.2   Absolute Lymphocytes 0.8 - 5.3 10e3/uL 1.2   Absolute Monocytes 0.0 - 1.3 10e3/uL 1.1   % Immature Granulocytes % 1   Absolute Neutrophils 1.6 - 8.3 10e3/uL 8.1   Absolute NRBCs 10e3/uL 0.0   NRBCs per 100 WBC <1 /100 0   (H): Data is abnormally high  (L): Data is abnormally low    CT 3/17/23  FINDINGS:   LUNGS AND PLEURA: A 3 mm noncalcified pulmonary nodule in medial right   lower lobe (5-88) and a subsolid 3 mm pulmonary nodule in the lateral   left lower lobe (5-96) are unchanged. A couple additional tiny 2 mm   pulmonary nodules are unchanged. Recommend attention on surveillance   imaging. No new or  enlarging pulmonary nodules. Mild consolidative   volume loss in the left lower lobe base is present, which appears   increased to comparison and is difficult to separate from a previously   seen mass in left upper quadrant which is further discussed below. No   pneumonic consolidation or pleural fluid. Central airways are patent.     MEDIASTINUM/AXILLAE: The previously seen solid anterior mediastinal   mass is slightly increased in size to most recent comparison measuring   4.0 x 4.0 cm (2-62), previously 3.8 x 2.8 cm. Heart size is normal. No   pericardial effusion. Thoracic aorta is normal in course and caliber.   No new or enlarging thoracic lymph nodes. A right chest wall   Port-A-Cath is present with the tip near the cavoatrial junction.     HEPATOBILIARY: Gallbladder is absent. No bile duct dilation. Mild   pneumobilia is seen. A 10 mm low-density observation in the right   hepatic lobe near the dome is unchanged and is incompletely   characterized, but statistically favors a cyst or hemangioma. If   further evaluation is desired, recommend contrast enhanced liver MRI.     PANCREAS: Normal.     SPLEEN: There has been interval increase in size of a lobulated mass   involving the spleen with extrasplenic extension to invade the left   hemidiaphragm. The mass is difficult to delineate on this unenhanced   study, but is estimated at 10.2 x 9.1 cm (2-121), previously 9.4 x 9.1   cm.     ADRENAL GLANDS: Normal.     KIDNEYS/BLADDER: There is loss of normal flap pains between the upper   pole of the left kidney and the left upper pole mass with subtle area   of low density involving the superior pole of the left renal cortex   (6-66). Similar-appearing suspected bilateral renal cysts. No specific   follow-up is recommended. No nephrolithiasis or hydronephrosis.   Urinary bladder is normal-appearing.     BOWEL: Diverticulosis in the colon. No acute inflammatory change. No   obstruction.     LYMPH NODES: There are  several new peritoneal nodules seen in the left   upper quadrant with the largest measuring 11 x 8 mm.     VASCULATURE: Unremarkable.     PELVIC ORGANS: Prostate gland is mildly enlarged.     OTHER: Mild to moderate atherosclerosis of the abdominal aorta and   iliac arteries. No aneurysmal dilation.     MUSCULOSKELETAL: Mild degenerative changes of the spine. No acute   osseous abnormality. Suspected bone island in the right femoral neck.   Suspected bone island in the left iliac wing. Stable intercostal   calcification between the anterior aspect of the left third and fourth   ribs.    Impression:     IMPRESSION:   1.  Interval progression of disease.   2.  Mild interval increase in size of anterior mediastinal mass.   3.  Several new nodules in the left upper quadrant, favoring   carcinomatosis.   4.  Left splenic mass with extrasplenic extension to involve the left   hemidiaphragm and possible left lower lung base.   5.  Mild left lower lung base consolidative volume loss, which may   represent atelectasis and possible lung involvement by the splenic   mass.   6.  Ill-defined low density in the upper pole of the left kidney as   well as loss of fat planes between the kidney and spleen, consistent   with splenic mass extension to the kidney.   7.  Additional nonacute findings as above.     ROBIN SLATER MD        Assessment and Plan:  #  Metastatic spindle cell sarcoma  S/p progression on multiple lines of therapy.  Has not tolerated prior chemotherapy well. Most recently received Trabectedin x 1 cycle November 2022; complicated by HUSSEIN, hepatic toxicity, nausea, anorexia, dehydration, pancytopenia. Now more recently course complicated by acute cholecystitis s/p lap carmelita 12/24, re-admission for sepsis and anemia.    Has been on chemo holiday since November 2022. Clinically has improved being off treatment, though cancer related pain remains an issue, even after radiation to splenic mass.    CT from 3/17/23  reviewed and he has disease progression. Would recommend starting Pazopanib 3/27/23. Will want to make sure baseline labs from tomorrow are acceptable to start treatment.    Messaged pharmacy to review teaching again.    Will do weekly labs with IVF. Request EKG on 4/6/23.    Will be meeting with MD on 3/30 as scheduled.      #  Chronic/recurrent mucositis   Ongoing throughout all of treatment. No thrush currently. Continue salt/soda swishes.  Requested ENT follow-up.      # Poor Oral Intake  # Dehydration  # Renal Insufficiency   # Hypokalemia  # Hypophosphatemia  Was requiring IVF 1-2 days/week in [place]. Continues to require this, is scheduled weekly. Doing OK, though recent creat elevated. Monitor.      Continue weekly labs and IVF at [place]. Continue potassium and phos supplement.       # Acute LFT elevation with hyperbilirubinemia, resolving  # Obstructive cholelithiasis, s/p lap carmelita  He is 3 weeks post lap-carmelita. LFTs continue to improve.     # Chemo-related pancytopenia, resolved  # Acute/chronic anemia  2/2 chemotherapy. Prior anemia-no bleeding source identified on ERCP, EGD and CT abd. Surgery felt unlikely related to post-op loss following lap carmelita. Stabilized after pRBC. Holding Celebrex/NSAIDs. Could have been related to acute infectious process/reactive with sepsis. Now likely ongoing with progressive malignancy.      Hgb stable at 10.8. Continue weekly monitoring and transfuse if hgb <8.       # Cancer related pain  Continue Oxycodone 5-10mg every 4 hours PRN. Continue topical lidocaine. Avoid NSAIDs. Palliative involved. Completed radiation. Discussed taking higher dose 10mg Oxycodone at bedtime to hopefully not have such severe pain in AM.      # Hypopituitarism  # Hypothyroidism  Continues Hydrocortisone and Synthroid-confirmed he is taking. Stress doses hydrocortisone with acute illness. Follows with Endo. TSH was low, will need to recheck in coupe weeks.      #   Nausea, dyspepsia  Continue  Zofran and Compazine. Now also on Zyprexa and notes improved appetite. Tums PRN.      #   Hoarseness  Likely secondary to mediastinal mass. Status post vocal cord injection in July with ENT     #  Depression/anxiety  Restarted on Lexapro by palliative. Mood brighter today. QTc WNL. Recheck 4/6 after starting pazopanib.      # HTN  Stopped lisinopril for possible contribution to elevated creat and switched to Amlodipine 5mg daily. Then had rash and swelling so held.    Not currently on any antihypertensives. Reviewed home readings appx 120s/80s. Will continue off meds and monitor.     30 minutes spent on the date of the encounter doing chart review, review of test results, interpretation of tests, patient visit and documentation     Maynor Rios PA-C  Department of Hematology and Oncology  [facility]         Again, thank you for allowing me to participate in the care of your patient.        Sincerely,        GERALDO Mullins

## 2023-03-22 NOTE — LETTER
3/22/2023         RE: Ifrah Huitron  [address] [facility] [id number]        Dear Colleague,    Thank you for referring your patient, Ifrah Huitron, to the [facility]. Please see a copy of my visit note below.    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 History as follows:  - March 2021 presented with chest pain and back pain, imaging with lung mets and biopsy of mediastinal mass consistent with spindle cell sarcoma with high PD-L1. Baseline imaging lung mets, left sided subdiaphragmatic mass, liver lesions.   - June 2021-October 2021 Pembrolizumab, tolerated well, stopped due to disease progression  - October 2021-February 2022 Doxil + Ifos, not tolerated well, required multiple treatment delays, IVF support, dose reductions, horrible mucositis. Stopped due to tolerance  - March 2022-August 2022 Doxil alone, not tolerated well with ongoing skin and mouth toxicity, requiring delays and dose reductions. Stopped due to disease progression  - August 2022-November 2022 Gemzar alone, not tolerated well with cytopenias and edema, stopped due to disease progression  - November 2022 Trabectedin, only had one cycle. Horrible toxicity with HUSSEIN, hepatic toxicity, cytopenias, required hospital admission. Stopped due to poor tolerance and disease progression  -December 2022 Admission for acute cholecystitis s/p lap carmelita 12/24/22, repeat admission after discharge for sepsis and pain     Plan to start Pazopanib as next line therapy for sarcoma, however haven't been able to start due to ongoing fatigue, fevers (ED visit 1/10/23 discharged on Levaquin), and poorly controlled pain.     CT 1/25/23 with slight progression of splenic mass, otherwise stable disease. As such opted to do radiation to mass and hold off on  "chemotherapy for the time.     He has continued off treatment. CT 3/17/23 with disease progression. Now planning on starting Pazopanib.     Interval History:  Ifrah is overall doing OK. He has had issues with worsening pain in the AM that makes him feel weak. This improves after he takes Oxycodone and applies lidocaine. Taste continues to be an issue and he worries he isn't doing as well with eating and drinking. He has mild nausea, managed with antiemetics. No bowel or bladder concerns. No fevers. BP has been generally stable off meds. No rashes.     Current Outpatient Medications   Medication Sig Dispense Refill     acetaminophen (TYLENOL) 325 MG tablet Take 2 tablets (650 mg) by mouth every 6 hours as needed for mild pain or other (and adjunct with moderate or severe pain or per patient request)       aspirin-acetaminophen-caffeine (EXCEDRIN MIGRAINE) 250-250-65 MG tablet Take 1 tablet by mouth daily as needed for headaches       doxepin (SINEQUAN) 10 MG/ML (HIGH CONC) solution Take 2.5 mLs (25 mg) by mouth 2 times daily as needed (rinse for mucositis) Dilute the 2.5 mL of doxepin to 5 ml total in sterile or distilled water. (Patient not taking: Reported on 3/9/2023) 118 mL 0     escitalopram (LEXAPRO) 10 MG tablet Take 1 tablet (10 mg) by mouth daily 30 tablet 0     guaiFENesin-codeine (GUAIFENESIN AC) 100-10 MG/5ML syrup Take 10 mLs by mouth every 4 hours as needed for cough 118 mL 0     hydrocortisone (CORTEF) 10 MG tablet Take 20 mg in the morning and 10 mg in the afternoon 270 tablet 2     Insulin Syringe-Needle U-100 27.5G X 5/8\" 2 ML MISC 1 each daily as needed (with solucortef for adrenal crisis) 10 each 1     levothyroxine (SYNTHROID/LEVOTHROID) 75 MCG tablet Take one tab p.o. 6 days per week and 0.5 tabs one day per week ( total of 6.5 tablets weekly) 90 tablet 2     lidocaine (XYLOCAINE) 5 % external ointment Apply topically 4 times daily as needed for moderate pain (4-6) 240 g 1     OLANZapine (ZYPREXA) " 5 MG tablet Take 1 tablet (5 mg) by mouth 2 times daily as needed 60 tablet 1     ondansetron (ZOFRAN) 4 MG tablet Take 1-2 tablets (4-8 mg) by mouth every 8 hours as needed for nausea 60 tablet 3     oxyCODONE (ROXICODONE) 5 MG tablet Take 1-2 tablets (5-10 mg) by mouth every 4 hours as needed for moderate to severe pain 100 tablet 0     phosphorus tablet 250 mg (PHOSPHA 250 NEUTRAL) 250 MG per tablet Take 1 tablet (250 mg) by mouth daily 90 tablet 3     potassium chloride ER (KLOR-CON M) 20 MEQ CR tablet Take 1 tablet (20 mEq) by mouth daily 90 tablet 3     SUMAtriptan (IMITREX) 50 MG tablet Take 1 tablet (50 mg) by mouth at onset of headache for migraine May repeat in 2 hours. Max 4 tablets/24 hours. 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     Thyroid disease     removed pituitary gland     Past Surgical History:   Procedure Laterality Date     COLONOSCOPY N/A 12/17/2020    Procedure: COLONOSCOPY;  Surgeon: Ken Camacho MD;  [facility]     ENDOSCOPIC RETROGRADE CHOLANGIOPANCREATOGRAM N/A 12/23/2022    Procedure: ENDOSCOPIC RETROGRADE CHOLANGIOPANCREATOGRAPHY;  Surgeon: Jim Lamar MD;  Location: [facility] OR     ENT SURGERY       ESOPHAGOSCOPY, GASTROSCOPY, DUODENOSCOPY (EGD), COMBINED N/A 12/27/2022    Procedure: ESOPHAGOGASTRODUODENOSCOPY (EGD);  Surgeon: Brenton Francois MD;  Location: [facility] GI     HERNIA REPAIR       INSERT PORT VASCULAR ACCESS Right 1/28/2022    Procedure: INSERTION, VASCULAR ACCESS PORT;  Surgeon: Daniel Kinney MD;  Location: [facility] OR     IR CHEST PORT PLACEMENT > 5 YRS OF AGE  1/28/2022     LAPAROSCOPIC CHOLECYSTECTOMY N/A 12/24/2022    Procedure: CHOLECYSTECTOMY, LAPAROSCOPIC;  Surgeon: Froy More DO;  Location: [facility] OR     LARYNGOSCOPY, EXCISE VOCAL CORD LESION  MICROSCOPIC, COMBINED Left 07/01/2021    Procedure: MICROLARYNGOSCOPY, LEFT TRUE VOCAL CORD INJECTION WITH PROLARYN;  Surgeon: Kyree Bearden MD;  Location: [facility] OR     PHACOEMULSIFICATION WITH STANDARD INTRAOCULAR LENS IMPLANT Right 03/10/2021    Procedure: Cataract removal with implant.;  Surgeon: Jamir Mac MD;  Location: [facility] OR     PHACOEMULSIFICATION WITH STANDARD INTRAOCULAR LENS IMPLANT Left 04/05/2021    Procedure: Cataract removal with implant.;  Surgeon: Jamir Mac MD;  Location: [facility] OR     PICC DOUBLE LUMEN PLACEMENT Right 12/01/2021    5FR DL PICC, basilic vein. L-38cm, 1cm out.     PICC DOUBLE LUMEN PLACEMENT Right 01/04/2022    Right cephalic, 41 cm, 1 external length     PITUITARY EXCISION       tooth pulled 4/7  Right      Allergies   Allergen Reactions     Penicillins Hives and Swelling     Occurred as small child   Pt tolerated meropenem 12/23/22       Social History   Social History     Tobacco Use     Smoking status: Never     Smokeless tobacco: Never   Substance Use Topics     Alcohol use: Yes     Comment: rare     Drug use: Never      Past medical history and social history were reviewed.    Physical Examination:  There were no vitals taken for this visit.  Wt Readings from Last 10 Encounters:   03/01/23 66.5 kg (146 lb 9.6 oz)   02/15/23 66.5 kg (146 lb 9.6 oz)   02/06/23 63.3 kg (139 lb 9.6 oz)   01/26/23 59.3 kg (130 lb 12.8 oz)   01/19/23 59.5 kg (131 lb 3.2 oz)   01/10/23 63.5 kg (140 lb)   01/05/23 61.5 kg (135 lb 8 oz)   12/27/22 65 kg (143 lb 6.4 oz)   12/26/22 62.1 kg (137 lb)   12/22/22 62.5 kg (137 lb 12.8 oz)     Video physical exam  General: Patient appears well in no acute distress.   Skin: No visualized rash or lesions on visualized skin  Eyes: EOMI, no erythema, sclera icterus or discharge noted  Resp: Appears to be breathing comfortably without accessory muscle usage, speaking in full sentences, no cough  MSK: Appears to have normal range of motion based  on visualized movements  Neurologic: No apparent tremors, facial movements symmetric  Psych: affect normal, alert and oriented    Laboratory Data:   Latest Reference Range & Units 03/16/23 10:00   Sodium 136 - 145 mmol/L 139   Potassium 3.4 - 5.3 mmol/L 4.1   Chloride 98 - 107 mmol/L 103   Carbon Dioxide (CO2) 22 - 29 mmol/L 27   Urea Nitrogen 8.0 - 23.0 mg/dL 28.3 (H)   Creatinine 0.67 - 1.17 mg/dL 1.44 (H)   GFR Estimate >60 mL/min/1.73m2 51 (L)   Calcium 8.8 - 10.2 mg/dL 9.4   Anion Gap 7 - 15 mmol/L 9   Magnesium 1.7 - 2.3 mg/dL 2.1   Phosphorus 2.5 - 4.5 mg/dL 3.2   Albumin 3.5 - 5.2 g/dL 3.5   Protein Total 6.4 - 8.3 g/dL 6.5   Alkaline Phosphatase 40 - 129 U/L 179 (H)   ALT 10 - 50 U/L 25   AST 10 - 50 U/L 23   Bilirubin Total <=1.2 mg/dL 0.3   Glucose 70 - 99 mg/dL 107 (H)   WBC 4.0 - 11.0 10e3/uL 10.7   Hemoglobin 13.3 - 17.7 g/dL 10.8 (L)   Hematocrit 40.0 - 53.0 % 35.4 (L)   Platelet Count 150 - 450 10e3/uL 188   RBC Count 4.40 - 5.90 10e6/uL 3.43 (L)   MCV 78 - 100 fL 103 (H)   MCH 26.5 - 33.0 pg 31.5   MCHC 31.5 - 36.5 g/dL 30.5 (L)   RDW 10.0 - 15.0 % 15.9 (H)   % Neutrophils % 76   % Lymphocytes % 12   % Monocytes % 10   % Eosinophils % 1   % Basophils % 0   Absolute Basophils 0.0 - 0.2 10e3/uL 0.0   Absolute Eosinophils 0.0 - 0.7 10e3/uL 0.1   Absolute Immature Granulocytes <=0.4 10e3/uL 0.2   Absolute Lymphocytes 0.8 - 5.3 10e3/uL 1.2   Absolute Monocytes 0.0 - 1.3 10e3/uL 1.1   % Immature Granulocytes % 1   Absolute Neutrophils 1.6 - 8.3 10e3/uL 8.1   Absolute NRBCs 10e3/uL 0.0   NRBCs per 100 WBC <1 /100 0   (H): Data is abnormally high  (L): Data is abnormally low    CT 3/17/23  FINDINGS:   LUNGS AND PLEURA: A 3 mm noncalcified pulmonary nodule in medial right   lower lobe (5-88) and a subsolid 3 mm pulmonary nodule in the lateral   left lower lobe (5-96) are unchanged. A couple additional tiny 2 mm   pulmonary nodules are unchanged. Recommend attention on surveillance   imaging. No new or  enlarging pulmonary nodules. Mild consolidative   volume loss in the left lower lobe base is present, which appears   increased to comparison and is difficult to separate from a previously   seen mass in left upper quadrant which is further discussed below. No   pneumonic consolidation or pleural fluid. Central airways are patent.     MEDIASTINUM/AXILLAE: The previously seen solid anterior mediastinal   mass is slightly increased in size to most recent comparison measuring   4.0 x 4.0 cm (2-62), previously 3.8 x 2.8 cm. Heart size is normal. No   pericardial effusion. Thoracic aorta is normal in course and caliber.   No new or enlarging thoracic lymph nodes. A right chest wall   Port-A-Cath is present with the tip near the cavoatrial junction.     HEPATOBILIARY: Gallbladder is absent. No bile duct dilation. Mild   pneumobilia is seen. A 10 mm low-density observation in the right   hepatic lobe near the dome is unchanged and is incompletely   characterized, but statistically favors a cyst or hemangioma. If   further evaluation is desired, recommend contrast enhanced liver MRI.     PANCREAS: Normal.     SPLEEN: There has been interval increase in size of a lobulated mass   involving the spleen with extrasplenic extension to invade the left   hemidiaphragm. The mass is difficult to delineate on this unenhanced   study, but is estimated at 10.2 x 9.1 cm (2-121), previously 9.4 x 9.1   cm.     ADRENAL GLANDS: Normal.     KIDNEYS/BLADDER: There is loss of normal flap pains between the upper   pole of the left kidney and the left upper pole mass with subtle area   of low density involving the superior pole of the left renal cortex   (6-66). Similar-appearing suspected bilateral renal cysts. No specific   follow-up is recommended. No nephrolithiasis or hydronephrosis.   Urinary bladder is normal-appearing.     BOWEL: Diverticulosis in the colon. No acute inflammatory change. No   obstruction.     LYMPH NODES: There are  several new peritoneal nodules seen in the left   upper quadrant with the largest measuring 11 x 8 mm.     VASCULATURE: Unremarkable.     PELVIC ORGANS: Prostate gland is mildly enlarged.     OTHER: Mild to moderate atherosclerosis of the abdominal aorta and   iliac arteries. No aneurysmal dilation.     MUSCULOSKELETAL: Mild degenerative changes of the spine. No acute   osseous abnormality. Suspected bone island in the right femoral neck.   Suspected bone island in the left iliac wing. Stable intercostal   calcification between the anterior aspect of the left third and fourth   ribs.    Impression:     IMPRESSION:   1.  Interval progression of disease.   2.  Mild interval increase in size of anterior mediastinal mass.   3.  Several new nodules in the left upper quadrant, favoring   carcinomatosis.   4.  Left splenic mass with extrasplenic extension to involve the left   hemidiaphragm and possible left lower lung base.   5.  Mild left lower lung base consolidative volume loss, which may   represent atelectasis and possible lung involvement by the splenic   mass.   6.  Ill-defined low density in the upper pole of the left kidney as   well as loss of fat planes between the kidney and spleen, consistent   with splenic mass extension to the kidney.   7.  Additional nonacute findings as above.     ROBIN SLATER MD        Assessment and Plan:  #  Metastatic spindle cell sarcoma  S/p progression on multiple lines of therapy.  Has not tolerated prior chemotherapy well. Most recently received Trabectedin x 1 cycle November 2022; complicated by HUSSEIN, hepatic toxicity, nausea, anorexia, dehydration, pancytopenia. Now more recently course complicated by acute cholecystitis s/p lap carmelita 12/24, re-admission for sepsis and anemia.    Has been on chemo holiday since November 2022. Clinically has improved being off treatment, though cancer related pain remains an issue, even after radiation to splenic mass.    CT from 3/17/23  reviewed and he has disease progression. Would recommend starting Pazopanib 3/27/23. Will want to make sure baseline labs from tomorrow are acceptable to start treatment.    Messaged pharmacy to review teaching again.    Will do weekly labs with IVF. Request EKG on 4/6/23.    Will be meeting with MD on 3/30 as scheduled.      #  Chronic/recurrent mucositis   Ongoing throughout all of treatment. No thrush currently. Continue salt/soda swishes.  Requested ENT follow-up.      # Poor Oral Intake  # Dehydration  # Renal Insufficiency   # Hypokalemia  # Hypophosphatemia  Was requiring IVF 1-2 days/week in [place]. Continues to require this, is scheduled weekly. Doing OK, though recent creat elevated. Monitor.      Continue weekly labs and IVF at [place]. Continue potassium and phos supplement.       # Acute LFT elevation with hyperbilirubinemia, resolving  # Obstructive cholelithiasis, s/p lap carmelita  He is 3 weeks post lap-carmelita. LFTs continue to improve.     # Chemo-related pancytopenia, resolved  # Acute/chronic anemia  2/2 chemotherapy. Prior anemia-no bleeding source identified on ERCP, EGD and CT abd. Surgery felt unlikely related to post-op loss following lap carmelita. Stabilized after pRBC. Holding Celebrex/NSAIDs. Could have been related to acute infectious process/reactive with sepsis. Now likely ongoing with progressive malignancy.      Hgb stable at 10.8. Continue weekly monitoring and transfuse if hgb <8.       # Cancer related pain  Continue Oxycodone 5-10mg every 4 hours PRN. Continue topical lidocaine. Avoid NSAIDs. Palliative involved. Completed radiation. Discussed taking higher dose 10mg Oxycodone at bedtime to hopefully not have such severe pain in AM.      # Hypopituitarism  # Hypothyroidism  Continues Hydrocortisone and Synthroid-confirmed he is taking. Stress doses hydrocortisone with acute illness. Follows with Endo. TSH was low, will need to recheck in coupe weeks.      #   Nausea, dyspepsia  Continue  Zofran and Compazine. Now also on Zyprexa and notes improved appetite. Tums PRN.      #   Hoarseness  Likely secondary to mediastinal mass. Status post vocal cord injection in July with ENT     #  Depression/anxiety  Restarted on Lexapro by palliative. Mood brighter today. QTc WNL. Recheck 4/6 after starting pazopanib.      # HTN  Stopped lisinopril for possible contribution to elevated creat and switched to Amlodipine 5mg daily. Then had rash and swelling so held.    Not currently on any antihypertensives. Reviewed home readings appx 120s/80s. Will continue off meds and monitor.     30 minutes spent on the date of the encounter doing chart review, review of test results, interpretation of tests, patient visit and documentation     Maynor Rios PA-C  Department of Hematology and Oncology  [facility]         Again, thank you for allowing me to participate in the care of your patient.        Sincerely,        GERALDO Mullins

## 2023-03-23 NOTE — PROGRESS NOTES
Infusion Nursing Note:  Ifrah Huitron presents today for IV fluids .    Patient seen by provider today: No   present during visit today: Not Applicable.    Note: N/A.    Intravenous Access:  Implanted Port.     Treatment Conditions:  Lab Results   Component Value Date     03/23/2023    POTASSIUM 4.0 03/23/2023    MAG 2.3 03/23/2023    CR 1.52 (H) 03/23/2023    COTY 9.2 03/23/2023    BILITOTAL 0.2 03/23/2023    ALBUMIN 3.2 (L) 03/23/2023    ALT 14 03/23/2023    AST 21 03/23/2023       Post Infusion Assessment:  Patient tolerated infusion without incident.  Blood return noted pre and post infusion.  No evidence of extravasations.  Access discontinued per protocol.     Discharge Plan:   Patient discharged in stable condition accompanied by: self.  Departure Mode: Ambulatory.      Beth Monterroso RN

## 2023-03-24 NOTE — ORAL ONC MGMT
Oral Chemotherapy Monitoring Program     Placed call to patient in follow up of oral chemotherapy. Left message requesting call back. No drug names were mentioned. Will update when response received.     Lizzie Bacon, PharmD  Oral Chemotherapy Monitoring Program  [facility]  [phone]

## 2023-03-24 NOTE — ORAL ONC MGMT
"Oral Chemotherapy Monitoring Program    Lab Monitoring Plan  Weekly - at [facility]  Subjective/Objective:  Ifrah Huitron is a 72 year old male contacted by phone for an re-teach visit for oral chemotherapy education.  Patient has had 3 months pass since his initial eduction. Reviewed new start education.    ORAL CHEMOTHERAPY 12/30/2022 12/30/2022 1/9/2023 3/24/2023 3/24/2023   Assessment Type Initial Work up New Teach Refill Left Voicemail New Teach   Diagnosis Code Sarcoma Sarcoma Sarcoma Sarcoma Sarcoma   Providers Dr. Mariella Wolff   [facility]   Drug Name Votrient (pazopanib) Votrient (pazopanib) Votrient (pazopanib) Votrient (pazopanib) Votrient (pazopanib)   Dose 800 mg 800 mg 800 mg 800 mg 800 mg   Current Schedule Daily Daily Daily - Daily   Cycle Details Continuous Continuous Continuous Continuous Continuous   Any new drug interactions? - Yes - - -   Pharmacist Intervention? - Yes - - -   Intervention(s) - Drug changed (non-chemo) - - -   Is the dose as ordered appropriate for the patient? - Yes - - -       Last PHQ-2 Score on record:   PHQ-2 ( 1999 [employer]) 12/9/2021 3/23/2021   Q1: Little interest or pleasure in doing things 0 0   Q2: Feeling down, depressed or hopeless 0 0   PHQ-2 Score 0 0   PHQ-2 Total Score (12-17 Years)- Positive if 3 or more points; Administer PHQ-A if positive 0 0       Vitals:  BP:   BP Readings from Last 1 Encounters:   03/23/23 118/77     Wt Readings from Last 1 Encounters:   03/01/23 66.5 kg (146 lb 9.6 oz)     Estimated body surface area is 1.77 meters squared as calculated from the following:    Height as of 12/27/22: 1.702 m (5' 7\").    Weight as of 3/1/23: 66.5 kg (146 lb 9.6 oz).    Labs:  _  Result Component Current Result Ref Range   Sodium 138 (3/23/2023) 136 - 145 mmol/L     _  Result Component Current Result Ref Range   Potassium 4.0 (3/23/2023) 3.4 - 5.3 mmol/L     _  Result " Component Current Result Ref Range   Calcium 9.2 (3/23/2023) 8.8 - 10.2 mg/dL     _  Result Component Current Result Ref Range   Magnesium 2.3 (3/23/2023) 1.7 - 2.3 mg/dL     _  Result Component Current Result Ref Range   Phosphorus 2.7 (3/23/2023) 2.5 - 4.5 mg/dL     _  Result Component Current Result Ref Range   Albumin 3.2 (L) (3/23/2023) 3.5 - 5.2 g/dL     _  Result Component Current Result Ref Range   Urea Nitrogen 23.3 (H) (3/23/2023) 8.0 - 23.0 mg/dL     _  Result Component Current Result Ref Range   Creatinine 1.52 (H) (3/23/2023) 0.67 - 1.17 mg/dL     _  Result Component Current Result Ref Range   AST 21 (3/23/2023) 10 - 50 U/L     _  Result Component Current Result Ref Range   ALT 14 (3/23/2023) 10 - 50 U/L     _  Result Component Current Result Ref Range   Bilirubin Total 0.2 (3/23/2023) <=1.2 mg/dL     _  Result Component Current Result Ref Range   WBC Count 10.5 (3/23/2023) 4.0 - 11.0 10e3/uL     _  Result Component Current Result Ref Range   Hemoglobin 9.9 (L) (3/23/2023) 13.3 - 17.7 g/dL     _  Result Component Current Result Ref Range   Platelet Count 218 (3/23/2023) 150 - 450 10e3/uL     _  Result Component Current Result Ref Range   Absolute Neutrophils 12.6 (H) (3/2/2023) 1.6 - 8.3 10e3/uL     _  Result Component Current Result Ref Range   Absolute Neutrophils 8.4 (H) (3/23/2023) 1.6 - 8.3 10e3/uL        Assessment:  Patient is appropriate to start therapy.    Plan:  Basic chemotherapy teaching was reviewed with the patient, wife, and daughter including indication, start date of therapy, dose, administration, adverse effects, missed doses, food and drug interactions, monitoring, side effect management, office contact information, and safe handling. Written materials were provided and all questions answered.    Follow-Up:  Patient plans to start 3/27, next labs are 3/30  1 week initial assessment ~4/3     Tin BlasD  Oral Chemotherapy Monitoring Program  [facility]  [facility]  [phone]

## 2023-03-24 NOTE — TELEPHONE ENCOUNTER
3/24/23    Called patient to discuss lab results. He is not feeling well. He has felt progressively worse over the last two days.  - Fatigue  - Dizziness  - Weakness  - Dyspnea on exertion  - Not eating well. Drinking is OK.   - Diarrhea (going to cut back on stool softener)    No fevers. No chest pain. No SOB at rest. Cough is stable. No vomiting. No urinary symptoms. Swelling is significantly improved since stopping amlodipine. No rash.     Given acute decline over the last 48 hours and going into the weekend would recommend going to ED for assessment.     He is going to take another hydrocortisone and see how he feels.     Wait on starting Pazopanib until we see how he feels Monday.       Maynor Rios PA-C

## 2023-03-24 NOTE — ED TRIAGE NOTES
"Pt was suppose to re-start Chemo on Monday but has been feeling ill since last night. He had blood drawn yesterday and states his labs \"were going the wrong way.\" He states that his nurse was most concerned with kidney labs and an infection. He developed more severe fatigue and chills last night and continued today. He is also having generalized weakness. He tried to get up from a stool and his legs gave out. He was able to sit himself down on the floor. No fall or injury.      Triage Assessment     Row Name 03/24/23 9524       Triage Assessment (Adult)    Airway WDL WDL       Respiratory WDL    Respiratory WDL WDL       Skin Circulation/Temperature WDL    Skin Circulation/Temperature WDL WDL       Cardiac WDL    Cardiac WDL WDL       Peripheral/Neurovascular WDL    Peripheral Neurovascular WDL WDL       Cognitive/Neuro/Behavioral WDL    Cognitive/Neuro/Behavioral WDL WDL              "

## 2023-03-25 NOTE — ED PROVIDER NOTES
"  Emergency Department Encounter     Evaluation Date & Time:   3/24/2023  7:02 PM    CHIEF COMPLAINT:  Fatigue and Chills      Triage Note:  Pt was suppose to re-start Chemo on Monday but has been feeling ill since last night. He had blood drawn yesterday and states his labs \"were going the wrong way.\" He states that his nurse was most concerned with kidney labs and an infection. He developed more severe fatigue and chills last night and continued today. He is also having generalized weakness. He tried to get up from a stool and his legs gave out. He was able to sit himself down on the floor. No fall or injury.      Triage Assessment     Row Name [id number]       Triage Assessment (Adult)    Airway WDL WDL       Respiratory WDL    Respiratory WDL WDL       Skin Circulation/Temperature WDL    Skin Circulation/Temperature WDL WDL       Cardiac WDL    Cardiac WDL WDL       Peripheral/Neurovascular WDL    Peripheral Neurovascular WDL WDL       Cognitive/Neuro/Behavioral WDL    Cognitive/Neuro/Behavioral WDL WDL                    Impression and Plan       FINAL IMPRESSION:    ICD-10-CM    1. Generalized weakness  R53.1       2. Other fatigue  R53.83       3. Elevated procalcitonin  R79.89             ED COURSE & MEDICAL DECISION MAKIN:54 PM I met with the patient for the initial interview and physical examination. Discussed plan for treatment and workup in the ED.      72 year old male, history of lung cancer (not on chemotherapy x 2 months) and CKD, who presents for evaluation of fatigue, generalized weakness and chills x one week. 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 He also endorses intermittent lightheadedness and slight dysuria at the end of urination that has been ongoing x one month. He otherwise denies headache, chest pain, shortness of breath, abdominal pain, N/V/D, cough, fever.    Nothing on exam focal for infection.    IV access established and blood sent for labs.    Labs " remarkable for leukocytosis (WBC 13.5) with left shift (ANC 12.1); he does take chronic steroids and this may explain some of his leukocytosis.  Procalcitonin is mildly elevated (0.89) with normal lactate (1.0).  Blood cultures obtained.    UA mildly contaminated (2 skin cells) with few bacteria and nothing to suggest infection; urine culture pending.    COVID, influenza and RSV tests negative.    Recent imaging has shown worsening of his cancer:  CT Chest / Abdomen / Pelvis performed on 3/17/23 demonstrated:   1.  Interval progression of disease.   2.  Mild interval increase in size of anterior mediastinal mass.   3.  Several new nodules in the left upper quadrant, favoring carcinomatosis.   4.  Left splenic mass with extrasplenic extension to involve the left hemidiaphragm and possible left lower lung base.   5.  Mild left lower lung base consolidative volume loss, which may represent atelectasis and possible lung involvement by the splenic mass.   6.  Ill-defined low density in the upper pole of the left kidney as well as loss of fat planes between the kidney and spleen, consistent with splenic mass extension to the kidney.   7.  Additional nonacute findings as above.     Given recent CT imaging, I do not think emergent repeat imaging is indicated.    Oncology consulted and did not think antibiotics were indicated given no identified source of infection.    I offered admission for further monitoring, however the patient much prefers to discharge to home.  Aside from mildly elevated procalcitonin, his evaluation, vitals and exam are reassuring and I think close outpatient follow-up is reasonable.  Return precautions provided.  Patient stable throughout ED course.      At the conclusion of the encounter I discussed the results of all the tests and the disposition. The questions were answered. The patient acknowledged understanding and was agreeable with the care plan.    Medical Decision  Making    History:    Supplemental history from: Documented in chart, if applicable and Family Member/Significant Other    External Record(s) reviewed: Documented in chart, if applicable., Inpatient Record: [facility] Emergency Department (1/10/23) and Prior Imaging: CT Chest Abdomen Pelvis performed at [facility] (3/17/23).    Work Up:    Chart documentation includes differential considered and any EKGs or imaging independently interpreted by provider, where specified.    In additional to work up documented, I considered the following work up: Documented in chart, if applicable. and N/A    External consultation:    Discussion of management with another provider: Documented in chart, if applicable and Hematology - Oncology    Complicating factors:    Care impacted by chronic illness: Cancer/Chemotherapy and Chronic Kidney Disease    Care affected by social determinants of health: N/A    Disposition considerations: Discharge. No recommendations on prescription strength medication(s). I considered admission, but ultimately discharged patient I offered admission, however patient would like to discharge to home. Given reassuring evaluation and vitals, I think this is reasonable - given strict return precautions..         MEDICATIONS GIVEN IN THE EMERGENCY DEPARTMENT:  Medications - No data to display    NEW PRESCRIPTIONS STARTED AT TODAY'S ED VISIT:  Discharge Medication List as of 3/24/2023 10:51 PM          HPI     HPI     Ifrah Huitron is a 72 year old male, history of lung cancer (not on chemotherapy x 2 months) and CKD, who presents to this ED via private vehicle accompanied by wife for evaluation of fatigue and chills.    Per chart review, patient was evaluated at [facility] Emergency Department on 1/10/23 for chills and fever. No ongoing chemotherapy for 2 months. Some slight lower left flank/back pain which she says is chronic and related to sarcoma.  Initial laboratory evaluation is  reassuring. White cell count is normal. No evidence of infectious process following chest x-ray and urine analysis. Patient discharged with Levaquin with plan to follow up with pre-planned primary care provider appointment.    Per chart review, patient had a CT Chest Abdomen Pelvis performed at [facility] on 3/17/23.   IMPRESSION:   1.  Interval progression of disease.   2.  Mild interval increase in size of anterior mediastinal mass.   3.  Several new nodules in the left upper quadrant, favoring carcinomatosis.   4.  Left splenic mass with extrasplenic extension to involve the left hemidiaphragm and possible left lower lung base.   5.  Mild left lower lung base consolidative volume loss, which may represent atelectasis and possible lung involvement by the splenic mass.   6.  Ill-defined low density in the upper pole of the left kidney as well as loss of fat planes between the kidney and spleen, consistent with splenic mass extension to the kidney.   7.  Additional nonacute findings as above.     Patient reports that he has been experiencing fatigue, generalized weakness and chills over the past week. He was told to come to the ED by his PA after concerns for his kidney following infusion of IV fluids on Thursday. Patient reports left lower back pain which is chronic and related to his sarcoma. He endorses intermittent lightheadedness. He also reports slight dysuria at the end of urination that has been ongoing x one month.    He has otherwise been in his usual state of health and denies headache, chest pain, shortness of breath, abdominal pain, N/V/D, cough, fever or other concerns.     Recent imaging has shown worsening of his cancer.      REVIEW OF SYSTEMS:  All other systems reviewed and are negative.      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     Thyroid disease         Past Surgical History:   Procedure Laterality Date     COLONOSCOPY N/A 12/17/2020    Procedure: COLONOSCOPY;  Surgeon: Ken Camacho MD;  Location: [facility]     ENDOSCOPIC RETROGRADE CHOLANGIOPANCREATOGRAM N/A 12/23/2022    Procedure: ENDOSCOPIC RETROGRADE CHOLANGIOPANCREATOGRAPHY;  Surgeon: Jim Lamar MD;  [place] [facility] [place]     ENT SURGERY       ESOPHAGOSCOPY, GASTROSCOPY, DUODENOSCOPY (EGD), COMBINED N/A 12/27/2022    Procedure: ESOPHAGOGASTRODUODENOSCOPY (EGD);  Surgeon: Brenton Francois MD;  Location: [facility] GI     HERNIA REPAIR       INSERT PORT VASCULAR ACCESS Right 1/28/2022    Procedure: INSERTION, VASCULAR ACCESS PORT;  Surgeon: Daniel Kinney MD;  [place] [facility] [place]     IR CHEST PORT PLACEMENT > 5 YRS OF AGE  1/28/2022     LAPAROSCOPIC CHOLECYSTECTOMY N/A 12/24/2022    Procedure: CHOLECYSTECTOMY, LAPAROSCOPIC;  Surgeon: Froy More DO;  [place] [facility] [place] [place]     LARYNGOSCOPY, EXCISE VOCAL CORD LESION MICROSCOPIC, COMBINED Left 07/01/2021    Procedure: MICROLARYNGOSCOPY, LEFT TRUE VOCAL CORD INJECTION WITH PROLARYN;  Surgeon: Kyree Bearden MD;  Location: [facility] OR     PHACOEMULSIFICATION WITH STANDARD INTRAOCULAR LENS IMPLANT Right 03/10/2021    Procedure: Cataract removal with implant.;  Surgeon: Jamir Mac MD;  Location: [facility]     PHACOEMULSIFICATION WITH STANDARD INTRAOCULAR LENS IMPLANT Left 04/05/2021    Procedure: Cataract removal with implant.;  Surgeon: Jamir Mac MD;  Location: [facility]     PICC DOUBLE LUMEN PLACEMENT Right 12/01/2021    5FR DL PICC, basilic vein. L-38cm, 1cm out.     PICC DOUBLE LUMEN PLACEMENT Right 01/04/2022    Right cephalic, 41 cm, 1 external length     PITUITARY EXCISION       tooth pulled 4/7  Right        Family History   Problem Relation Age of Onset     Lupus Mother      ALS Father      Rheumatoid Arthritis Sister        Social History     Tobacco Use     Smoking status: Never     Smokeless tobacco:  "Never   Substance Use Topics     Alcohol use: Yes     Comment: rare     Drug use: Never       acetaminophen (TYLENOL) 325 MG tablet  aspirin-acetaminophen-caffeine (EXCEDRIN MIGRAINE) 250-250-65 MG tablet  doxepin (SINEQUAN) 10 MG/ML (HIGH CONC) solution  escitalopram (LEXAPRO) 10 MG tablet  guaiFENesin-codeine (GUAIFENESIN AC) 100-10 MG/5ML syrup  hydrocortisone (CORTEF) 10 MG tablet  Insulin Syringe-Needle U-100 27.5G X 5/8\" 2 ML MISC  levothyroxine (SYNTHROID/LEVOTHROID) 75 MCG tablet  lidocaine (XYLOCAINE) 5 % external ointment  OLANZapine (ZYPREXA) 5 MG tablet  ondansetron (ZOFRAN) 4 MG tablet  oxyCODONE (ROXICODONE) 5 MG tablet  [START ON 3/27/2023] pazopanib (VOTRIENT) 200 MG tablet  phosphorus tablet 250 mg (PHOSPHA 250 NEUTRAL) 250 MG per tablet  potassium chloride ER (KLOR-CON M) 20 MEQ CR tablet  SUMAtriptan (IMITREX) 50 MG tablet        Physical Exam     First Vitals:  Patient Vitals for the past 24 hrs:   BP Temp Temp src Pulse Resp SpO2 Height Weight   03/24/23 2304 117/63 -- -- 62 18 97 % -- --   03/24/23 2230 113/68 -- -- 62 16 96 % -- --   03/24/23 2200 103/57 -- -- 66 16 -- -- --   03/24/23 2145 -- -- -- 77 21 97 % -- --   03/24/23 2130 108/67 -- -- 66 16 96 % -- --   03/24/23 2100 102/62 -- -- 70 13 96 % -- --   03/24/23 2045 -- -- -- 67 16 96 % -- --   03/24/23 2030 124/68 -- -- 77 28 97 % -- --   03/24/23 2027 -- -- -- 71 15 96 % -- --   03/24/23 2000 -- -- -- 73 16 94 % -- --   03/24/23 1945 -- -- -- 74 23 96 % -- --   03/24/23 1930 139/71 -- -- 80 20 95 % -- --   03/24/23 1817 (!) 145/88 97.9  F (36.6  C) Oral 97 20 95 % 1.702 m (5' 7\") 65.8 kg (145 lb)       PHYSICAL EXAM:   Physical Exam    GENERAL: Awake, alert.  In no acute distress.   HEENT: Normocephalic, atraumatic. Pupils equal, round and reactive. Conjunctiva normal.   NECK: No stridor.  PULMONARY: Symmetrical breath sounds without distress.  Lungs clear to auscultation bilaterally without wheezes, rhonchi or rales.  CARDIO: Regular " rate and rhythm.  No significant murmur, rub or gallop.  Radial pulses strong and symmetrical.  ABDOMINAL: Abdomen soft, non-distended and non-tender to palpation.  No CVAT, BL.  EXTREMITIES: No lower extremity swelling or edema.      NEURO: Alert and oriented to person, place and time.  Cranial nerves grossly intact.  No focal motor deficit.  PSYCH: Normal mood and affect.      Results     LAB:  All pertinent labs reviewed and interpreted  Labs Ordered and Resulted from Time of ED Arrival to Time of ED Departure   BASIC METABOLIC PANEL - Abnormal       Result Value    Sodium 137      Potassium 4.4      Chloride 102      Carbon Dioxide (CO2) 26      Anion Gap 9      Urea Nitrogen 21.0      Creatinine 1.41 (*)     Calcium 9.0      Glucose 149 (*)     GFR Estimate 52 (*)    ROUTINE UA WITH MICROSCOPIC REFLEX TO CULTURE - Abnormal    Color Urine Light Yellow      Appearance Urine Turbid (*)     Glucose Urine 50 (*)     Bilirubin Urine Negative      Ketones Urine Negative      Specific Gravity Urine 1.019      Blood Urine 0.2 mg/dL (*)     pH Urine 5.5      Protein Albumin Urine 100 (*)     Urobilinogen Urine <2.0      Nitrite Urine Negative      Leukocyte Esterase Urine Negative      Bacteria Urine Few (*)     Mucus Urine Present (*)     RBC Urine 2      WBC Urine 1      Squamous Epithelials Urine 2 (*)     Hyaline Casts Urine 4 (*)     Granular Casts Urine 1 (*)    PROCALCITONIN - Abnormal    Procalcitonin 0.89 (*)    HEPATIC FUNCTION PANEL - Abnormal    Protein Total 6.4      Albumin 3.3 (*)     Bilirubin Total 0.3      Alkaline Phosphatase 181 (*)     AST 20      ALT 14      Bilirubin Direct <0.20     CBC WITH PLATELETS AND DIFFERENTIAL - Abnormal    WBC Count 13.5 (*)     RBC Count 3.04 (*)     Hemoglobin 9.5 (*)     Hematocrit 30.5 (*)           MCH 31.3      MCHC 31.1 (*)     RDW 15.1 (*)     Platelet Count 232      % Neutrophils 89      % Lymphocytes 6      % Monocytes 4      % Eosinophils 0      %  Basophils 0      % Immature Granulocytes 1      NRBCs per 100 WBC 0      Absolute Neutrophils 12.1 (*)     Absolute Lymphocytes 0.8      Absolute Monocytes 0.5      Absolute Eosinophils 0.0      Absolute Basophils 0.0      Absolute Immature Granulocytes 0.2      Absolute NRBCs 0.0     LACTIC ACID WHOLE BLOOD - Normal    Lactic Acid 1.0     INFLUENZA A/B, RSV, & SARS-COV2 PCR - Normal    Influenza A PCR Negative      Influenza B PCR Negative      RSV PCR Negative      SARS CoV2 PCR Negative     BLOOD CULTURE   BLOOD CULTURE   URINE CULTURE         I, Juan Manning, am serving as a scribe to document services personally performed by Patience Cruz MD based on my observation and the provider's statements to me. I, Patience Cruz MD attest that Juan Manning is acting in a scribe capacity, has observed my performance of the services and has documented them in accordance with my direction.    Patience Cruz MD  Emergency Medicine  [facility] EMERGENCY DEPARTMENT         Patience Cruz MD  03/25/23 [id number]

## 2023-03-25 NOTE — DISCHARGE INSTRUCTIONS
Please follow-up with your Primary Care Provider early this upcoming week (Monday / Tuesday) for a recheck; call to arrange appointment.    Return to the ER for worsening generalized weakness, worsening fatigue, any focal infectious symptoms (for example, cough, runny nose, etc.), fever or other concerns.

## 2023-03-27 NOTE — TELEPHONE ENCOUNTER
3/27/23    Patient felt better this weekend but today is feeling worse again. Having a lot of pain and is feeling weak and run down. He was busy this weekend so wonders if he overdid it. No fevers    He is going to start taking the Pazopanib tonight or tomorrow night pending how the day goes.  He has close follow-up with Dr. Wolff on Thursday. He will also keep us updated on how he is doing and monitor for any signs of infection.    Maynor Rios PA-C

## 2023-03-27 NOTE — NURSING NOTE
"Oncology Rooming Note    March 27, 2023 11:58 AM   Ifrah Huitron is a 72 year old male who presents for:    Chief Complaint   Patient presents with     Radiation Therapy     Follow up with Dr. Adamson     Initial Vitals: BP (!) 143/89 (BP Location: Left arm, Patient Position: Chair, Cuff Size: Adult Regular)   Pulse 91   Resp 16   Wt 63 kg (139 lb)   SpO2 97%   BMI 21.77 kg/m   Estimated body mass index is 21.77 kg/m  as calculated from the following:    Height as of 3/24/23: 1.702 m (5' 7\").    Weight as of this encounter: 63 kg (139 lb). Body surface area is 1.73 meters squared.  Data Unavailable Comment: Data Unavailable   No LMP for male patient.  Allergies reviewed: Yes  Medications reviewed: Yes    Medications: Medication refills not needed today.  Pharmacy name entered into EPIC:    [facility] - [place], [place] - [address] [facility] [place], [place] - [facility] [place], [place] - [address]   [facility], [place] - [address] [facility] [address] - [place], [place] - [address]     Clinical concerns:  Follow up today with Dr. Snow Naqvi RN            "

## 2023-03-27 NOTE — PROGRESS NOTES
Radiation Oncology Note    HPI: Mr. 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  He underwent palliative radiation therapy.    Radiation Treatment  1.  2/13/23 to 2/17/23: Abdomen, 2000 cGy in 5 fractions    The patient returns for follow up.    In the interval, the patient underwent restaging CT scan of the chest abdomen pelvis on 3/17/2023.  Imaging demonstrated overall progression of disease with increase in size of the previously treated splenic mass, increase in size of the mediastinal disease, and concern for peritoneal nodules.      The patient was seen by medical oncology team who discussed consideration of starting pazopanib.    He had initially noticed DC since completion of RT but this had worsened more recently. Remains on narcotic, following with palliative care team.        Plan:  1.  Most recent staging scans demonstrate overall progression of disease including treated splenic metastasis.  Patient will continue systemic treatment under the care of medical oncology (Dr. Wolff) at this time.    2.  Continue follow-up palliative care team for pain management.    3.  Return to clinic as needed.    Blayne Adamson M.D.  Department of Radiation Oncology  [facility]

## 2023-03-27 NOTE — LETTER
3/27/2023         RE: Ifrah Huitron  [id number]  [place] [address]        Dear Colleague,    Thank you for referring your patient, Ifrah Huitron, to the [facility]. Please see a copy of my visit note below.    Radiation Oncology Note    HPI: Mr. 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  He underwent palliative radiation therapy.    Radiation Treatment  1.  2/13/23 to 2/17/23: Abdomen, 2000 cGy in 5 fractions    The patient returns for follow up.    In the interval, the patient underwent restaging CT scan of the chest abdomen pelvis on 3/17/2023.  Imaging demonstrated overall progression of disease with increase in size of the previously treated splenic mass, increase in size of the mediastinal disease, and concern for peritoneal nodules.      The patient was seen by medical oncology team who discussed consideration of starting pazopanib.    He had initially noticed MD since completion of RT but this had worsened more recently. Remains on narcotic, following with palliative care team.        Plan:  1.  Most recent staging scans demonstrate overall progression of disease including treated splenic metastasis.  Patient will continue systemic treatment under the care of medical oncology (Dr. Wolff) at this time.    2.  Continue follow-up palliative care team for pain management.    3.  Return to clinic as needed.    Blayne Adamson M.D.  Department of Radiation Oncology  [facility]

## 2023-03-27 NOTE — TELEPHONE ENCOUNTER
Admitting MD at bedside.  IV abx infusing,  Lab called for 2nd lactic draw.    Medication: Guaifenesin-codeine 100-10 mg/5mL syrup  Last prescribing provider: GERALDO Mullins    Last clinic visit date: 3/22/23 with Danilo    Any missed appointments or no-shows since last clinic visit?: No    Recommendations for requested medication (if none, N/A): NA    Next clinic visit date: 3/30/23 with Dr. Wolff    Any other pertinent information (if none, N/A): NA    Pended and Routed to Provider.

## 2023-03-28 NOTE — LETTER
"3/28/2023       RE: Ifrah Huitron  [address] [address] [facility] [address]     Dear Colleague,    Thank you for referring your patient, Ifrah Huitron, [facility] at [facility]. Please see a copy of my visit note below.    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 Pembro started summer 2021.  8/2021 scan shows response to pembro.  10/2021 progression-->doxil+ifosfamide complicated by mucositis, nausea, thrush, need for dose reduction  12/2021 scans show stable disease; continue doxil/ifos, has needed dose reductions for tolerability  2/2022 scans with slight reduction in tumor size, continue dose reduced doxil/ifos  1/2023 he had several types of systemic therapy in 2022 and progressed/much toxicity, stopped in Nov. Discussing starting pazopanib but held off due to fatigue, fevers, poor qol.   2/2023 palliative RT to splenic mass  3/2023 progressing, starting pazopanib     Course complicated by dysphonia, working with SLP summer 2021.Vocal cord dysfunction, follows ENT.  Has surgical hypopituitarism.  Has CKD     Social - Lives with wife Abida; 5 adult kids, 19 grandkids. Ran a golf course.      Care Planning - ACP discussion 6/2021 palliative visit. Discussed hospice; he has a lot of personal familiarity with that. Discussed hospice care and timing of enrollment 1/2023 visit.    History:  History gathered today from: patient, family/loved ones, medical chart    Got splenic radiation; didn't help pain at all. Scan showed no shrinkage of tumor.  About to start pazopanib tonight \"If I feel good enough\"  Feels good enough overall \"it's my last resort.\"  Has severe dysgeusia. Forcing self to eat. Losing weight.  No emesis.  Taking 7.5 mg oxycodone prn and topical lidocaine for his LUQ pain; feels both help.  Takes oxyIR 2x a day, " occasionally tid.   Independent in ADLs; active around house.  Naps most days.  Sleep good  Mood good; on lexapro & olanzapine  He's been having chills.    PE: There were no vitals taken for this visit.   Wt Readings from Last 3 Encounters:   03/27/23 63 kg (139 lb)   03/24/23 65.8 kg (145 lb)   03/01/23 66.5 kg (146 lb 9.6 oz)     Alert NAD  Clear sensorium      Data reviewed:  I reviewed recent labs and imaging, my comments:  Cr 1.4  Alb 3.3    CT  IMPRESSION:  1.  Interval progression of disease.  2.  Mild interval increase in size of anterior mediastinal mass.  3.  Several new nodules in the left upper quadrant, favoring  carcinomatosis.   4.  Left splenic mass with extrasplenic extension to involve the left  hemidiaphragm and possible left lower lung base.   5.  Mild left lower lung base consolidative volume loss, which may  represent atelectasis and possible lung involvement by the splenic  mass.  6.  Ill-defined low density in the upper pole of the left kidney as  well as loss of fat planes between the kidney and spleen, consistent  with splenic mass extension to the kidney.  7.  Additional nonacute findings as above.        database reviewed: y      Impression & Recommendations:    73 yo with sarcomatoid mesothelioma, progressive, about to start pazopanib    He has progressive sx and is about to start pazopanib. He very much wants to give systemic therapy a try and physically feels well enough to do this; he understands it's probably his last option and we talked if it doesn't help or is intolerable I'd recommend enrolling him with a visiting hospice team at his home and he acknowledges that.     Pain is adequately controlled right now; reminded him his oxycodone can be adjusted and probably at some point will need to be; he should not hesitate if it is no longer controlling his pain to let us know that.    Follow-up 1 mo    Over 30 minutes spent on the date of the encounter doing chart review, history  and exam, patient education & counseling, documentation and other activities as noted above.    Thank you for involving us in the patient's care.   Sal Handy MD / Palliative Medicine / Text me via [facility].          Again, thank you for allowing me to participate in the care of your patient.      Sincerely,    Sal Handy MD

## 2023-03-28 NOTE — PROGRESS NOTES
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 Pembro started summer 2021.  8/2021 scan shows response to pembro.  10/2021 progression-->doxil+ifosfamide complicated by mucositis, nausea, thrush, need for dose reduction  12/2021 scans show stable disease; continue doxil/ifos, has needed dose reductions for tolerability  2/2022 scans with slight reduction in tumor size, continue dose reduced doxil/ifos  1/2023 he had several types of systemic therapy in 2022 and progressed/much toxicity, stopped in Nov. Discussing starting pazopanib but held off due to fatigue, fevers, poor qol.   2/2023 palliative RT to splenic mass  3/2023 progressing, starting pazopanib     Course complicated by dysphonia, working with SLP summer 2021.Vocal cord dysfunction, follows ENT.  Has surgical hypopituitarism.  Has CKD     Social - Lives with wife Abida; 5 adult kids, 19 grandkids. Ran a golf course.      Care Planning - ACP discussion 6/2021 palliative visit. Discussed hospice; he has a lot of personal familiarity with that. Discussed hospice care and timing of enrollment 1/2023 visit.    History:  History gathered today from: patient, family/loved ones, medical chart    Got splenic radiation; didn't help pain at all. Scan showed no shrinkage of tumor.  About to start pazopanib tonight \"If I feel good enough\"  Feels good enough overall \"it's my last resort.\"  Has severe dysgeusia. Forcing self to eat. Losing weight.  No emesis.  Taking 7.5 mg oxycodone prn and topical lidocaine for his LUQ pain; feels both help.  Takes oxyIR 2x a day, occasionally tid.   Independent in ADLs; active around house.  Naps most days.  Sleep good  Mood good; on lexapro & olanzapine  He's been having chills.    PE: There were no vitals taken for this visit.   Wt Readings from Last 3 Encounters:   03/27/23 63 kg (139 lb)   03/24/23 65.8 kg (145 lb) "   03/01/23 66.5 kg (146 lb 9.6 oz)     Alert NAD  Clear sensorium      Data reviewed:  I reviewed recent labs and imaging, my comments:  Cr 1.4  Alb 3.3    CT  IMPRESSION:  1.  Interval progression of disease.  2.  Mild interval increase in size of anterior mediastinal mass.  3.  Several new nodules in the left upper quadrant, favoring  carcinomatosis.   4.  Left splenic mass with extrasplenic extension to involve the left  hemidiaphragm and possible left lower lung base.   5.  Mild left lower lung base consolidative volume loss, which may  represent atelectasis and possible lung involvement by the splenic  mass.  6.  Ill-defined low density in the upper pole of the left kidney as  well as loss of fat planes between the kidney and spleen, consistent  with splenic mass extension to the kidney.  7.  Additional nonacute findings as above.        database reviewed: y      Impression & Recommendations:    73 yo with sarcomatoid mesothelioma, progressive, about to start pazopanib    He has progressive sx and is about to start pazopanib. He very much wants to give systemic therapy a try and physically feels well enough to do this; he understands it's probably his last option and we talked if it doesn't help or is intolerable I'd recommend enrolling him with a visiting hospice team at his home and he acknowledges that.     Pain is adequately controlled right now; reminded him his oxycodone can be adjusted and probably at some point will need to be; he should not hesitate if it is no longer controlling his pain to let us know that.    Follow-up 1 mo    Over 30 minutes spent on the date of the encounter doing chart review, history and exam, patient education & counseling, documentation and other activities as noted above.    Thank you for involving us in the patient's care.   Sal Handy MD / Palliative Medicine / Text me via [facility].      Video-Visit Details  Video Start Time: 10:49 AM  Video End Time:11:05  AM  Originating Location (pt. Location): Home  Distant Location (provider location):  Off-site  Platform used for Video Visit: Thomas

## 2023-03-28 NOTE — NURSING NOTE
Is the patient currently in the state of [place]? YES    Visit mode:VIDEO    If the visit is dropped, the patient can be reconnected by: VIDEO VISIT: Text to cell phone: [phone]    Will anyone else be joining the visit? YES: How would they like to receive their invitation?       How would you like to obtain your AVS? MyChart    Are changes needed to the allergy or medication list? NO    Patient denies any changes since echeck-in regarding medication and allergies and states all information entered during echeck-in remains accurate.    Reason for visit: Return Visit    Austin RODRIGUEZ

## 2023-03-29 NOTE — PROGRESS NOTES
Virtual Visit Details    Type of service:  Video Visit   Video Start Time: 815  Stop about 837  Pt home  prov offsite  En did not work so we used doximetry        3-30-23     I saw Ifrah Huitron for f/u of a a tumor metastatic to the lung.       Background  His history is somewhat complicated but in brief he began developing some low left back pain about the fall winter 0823-2346.  This is gradually increased in size and he has become more tired; this led to imaging showing several lung nodules and a biopsy showing a malignant tumor.   -  biopsy showed a malignant tumor without a specific other than the descriptive diagnosis.  Notably it is a strong PD-L1 expresser.    Specimen #: [id number]   Collected: 5/20/2021   FINAL DIAGNOSIS:   Left pleural mass biopsy, CT guided:   - Malignant spindle cell neoplasm with necrosis (see comment)     COMMENT:   Special stains were performed with appropriate controls.  The tumor cells   are diffusely positive for CK   AE1/AE3 and CK MIRZA.  There is also focal to patchy staining for D2-40,   CD68, and WT-1.  INI1 is retained in   the tumor cells.  The Ki-67 labeling index is approximately 70%.  The   tumor cells show high PD-L1 expression   (TPS 90%).  No expression of P63, calretinin, DOG1, ALK1, , CK 5/6,   STAT6, SMA, SALL4, desmin, S100,   Myogenin, CD21, and CD23 is identified in the tumor cells.  These findings    together with tumor morphology and   location are most compatible with malignant sarcomatoid mesothelioma.  The    differential diagnosis includes   sarcomatoid carcinoma and sarcomas such as synovial sarcoma and   histiocytic sarcoma.  Additional tests are   pending and may help to classify this tumor.   -  Baseline imaging 6/21/21 with progression of lung mets and left-sided subdiaphragmatic mass, stable liver lesions. He saw ENT for hoarseness thought 2/2 left true vocal fold motion impairment from mediastinal mass-underwent injection  7/1/21.   -        Interval history     He started keytruda 6-21-21.     CT 8/2/21 showed a positive response but CT 10/18/21 showed mixed response- LUQ mass larger, anterior mediastinal and left anterior pleural mass smaller. Keytruda stopped.     The pain went away initially w keytruda but recurred.     He started Doxil + Ifosfamide 10-26-21 but had a lot of toxicity. CT showed stable to positive response after 1 cycle.     CT 3-8-22 positive response.      Due to significant toxicity ifos was reduced and he went to doxil alone on 3-10-22     Due to progression he started gemzar 8-10-22.     He had a lot of problems with the Gemzar.    He got 1 cycle of trabectedin with a lot of toxicity  He had cholecystitis and cholecystectomy in Dec 2022.  He got XRT to the spleen mass for symptoms - unfortunately the XRT did not seem help.    Pain is fairly controlled with the pain Rx palliative.    He started pazopanib 2-28-23.    He gets out of few times a week a few times a week and enjoys.  He has a very supportive family.  They are having a birthday party this weekend for his wife and grandchild and many family members will be there.    His main complaint is loss of taste which been present about a year and this makes it difficult to eat.  He has not taken the nystatin recently but I suggested trying swish and swallow 4 times a day for for 5 days and see if that helps just in case.  His tongue does not look particularly bad today.    His mood is pretty good on the meds.      He notes he is sleeping a lot.  He gets WALKER on 1 floor and has to rest but he does not think that is a particular problem for him.    He controls GERD with Prilosec daily and prn tums.      He will try nystatin swish and swallow for 4 days to see if that helps at all with his taste.    Notably he had a hypophysisectomy in 2010 and has postsurgical hypopituitarism.        -  Background PMH, FH, SH  His past medical history is notable for  hypophyasectomy in 2010 and he is on replacement T4, cortisol.  He takes minocycline daily for rosacea and feels that as needed for GERD he has had a kidney stone in the past and has some DJD, and a hernia repair.  He describes an allergy to penicillin manifest as hives.  Family history is not particularly remarkable although his mother had lupus and his father had ALS and his sister has rheumatoid arthritis.  Social history reveals he is  with 5 adult children, is a never smoker and does not abuse alcohol or other drugs.  He works as a  and has several hobbies building a garage.  -     On exam he appeared comfortable with normal affect.  HEENT full EOMs, ptosis R-this is longstanding and unclear etiology.  No obvious thrush on tongue.  NECK no obvious goiter or mass  CHEST no respiratory distress  NEURO normal mentation and speech   PSYCH Good mood     -  On 4-7 creat 1.27, LFT OK, alb 3.6, Hb 10.6, plt 211  On 10-27 CBC OK w Hb 8.2, GNE OK w creat 1.14 though glu 124, LFT OK, alb 3.4    On 3-24 creat 1.41, alb 3.3, alk phos 181, Hb 9.5, ANC 12,100    -  I reviewed his images of 3-17-23 and there is further progression.  We discussed the images and addressed some questions.     Formal read:  CT-CAP 3-17-23  IMPRESSION:  1.  Interval progression of disease.  2.  Mild interval increase in size of anterior mediastinal mass.  3.  Several new nodules in the left upper quadrant, favoring  carcinomatosis.   4.  Left splenic mass with extrasplenic extension to involve the left  hemidiaphragm and possible left lower lung base.   5.  Mild left lower lung base consolidative volume loss, which may  represent atelectasis and possible lung involvement by the splenic  mass.  6.  Ill-defined low density in the upper pole of the left kidney as  well as loss of fat planes between the kidney and spleen, consistent  with splenic mass extension to the kidney.  7.  Additional nonacute findings as above.     -  We  discussed the situation; there is the lack of a truly specific diagnosis.      He got an initial response to Keytruda but then had progression and he began ifosfamide and Doxil.  There was some response but due to toxicity he started Doxil alone in March 2022.     Due to progression he started gemzar 8-10-22.     There was no clear benefit of the gemzar and he had a lot of toxicity.  He got 1 cycle of trabectedin with a lot of toxicity  He had cholecystitis and cholecystectomy in Dec 2022.  He got XRT to the spleen mass for symptoms but unfortunately this did not help.  Pain is actually fairly well controlled and he is palliative yesterday..    He started pazopanib 2-28-23.    We addressed some questions about dosing we are starting at 800 mg a day to see how he tolerates it with weekly labs.  We will probably restage in a couple months depending on how things are going.  He did ask whether there were any other options in we discussed we could consider regulant but this is not overly likely to have response but not unreasonable.    His other diagnoses include panhypopituitarism and will be continuing those medications, the rosacea; he will continue minocycline, the GERD; he will continue as needed Prilosec.  The diagnoses of DJD, history of kidney stones and penicillin allergy are noted.       All their question addressed and will call if others arise.     Luke Wolff M.D.  Professor  Hematology, Oncology and Transplantation

## 2023-03-30 NOTE — LETTER
3/30/2023         RE: Ifrah Huitron  [address] [place] [address]        Dear Colleague,    Thank you for referring your patient, Ifrah Huitron, to the [facility]. Please see a copy of my visit note below.    Virtual Visit Details    Type of service:  Video Visit   Video Start Time: 815  Stop about 837  Pt home  prov offsite  Plat-[place] did not work so we used doximetry        3-30-23     I saw Ifrah Huitron for f/u of a a tumor metastatic to the lung.       Background  His history is somewhat complicated but in brief he began developing some low left back pain about the fall winter 5434-9803.  This is gradually increased in size and he has become more tired; this led to imaging showing several lung nodules and a biopsy showing a malignant tumor.   -  biopsy showed a malignant tumor without a specific other than the descriptive diagnosis.  Notably it is a strong PD-L1 expresser.    Specimen #: [id number]   Collected: 5/20/2021   FINAL DIAGNOSIS:   Left pleural mass biopsy, CT guided:   - Malignant spindle cell neoplasm with necrosis (see comment)     COMMENT:   Special stains were performed with appropriate controls.  The tumor cells   are diffusely positive for CK   AE1/AE3 and CK MIRZA.  There is also focal to patchy staining for D2-40,   CD68, and WT-1.  INI1 is retained in   the tumor cells.  The Ki-67 labeling index is approximately 70%.  The   tumor cells show high PD-L1 expression   (TPS 90%).  No expression of P63, calretinin, DOG1, ALK1, , CK 5/6,   STAT6, SMA, SALL4, desmin, S100,   Myogenin, CD21, and CD23 is identified in the tumor cells.  These findings    together with tumor morphology and   location are most compatible with malignant sarcomatoid mesothelioma.  The    differential diagnosis includes   sarcomatoid carcinoma and sarcomas such as synovial sarcoma and   histiocytic sarcoma.  Additional tests are   pending and may help to classify this tumor.    -  Baseline imaging 6/21/21 with progression of lung mets and left-sided subdiaphragmatic mass, stable liver lesions. 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   -        Interval history     He started keytruda 6-21-21.     CT 8/2/21 showed a positive response but CT 10/18/21 showed mixed response- LUQ mass larger, anterior mediastinal and left anterior pleural mass smaller. Keytruda stopped.     The pain went away initially w keytruda but recurred.     He started Doxil + Ifosfamide 10-26-21 but had a lot of toxicity. CT showed stable to positive response after 1 cycle.     CT 3-8-22 positive response.      Due to significant toxicity ifos was reduced and he went to doxil alone on 3-10-22     Due to progression he started gemzar 8-10-22.     He had a lot of problems with the Gemzar.    He got 1 cycle of trabectedin with a lot of toxicity  He had cholecystitis and cholecystectomy in Dec 2022.  He got XRT to the spleen mass for symptoms - unfortunately the XRT did not seem help.    Pain is fairly controlled with the pain Rx palliative.    He started pazopanib 2-28-23.    He gets out of few times a week a few times a week and enjoys.  He has a very supportive family.  They are having a birthday party this weekend for his wife and grandchild and many family members will be there.    His main complaint is loss of taste which been present about a year and this makes it difficult to eat.  He has not taken the nystatin recently but I suggested trying swish and swallow 4 times a day for for 5 days and see if that helps just in case.  His tongue does not look particularly bad today.    His mood is pretty good on the meds.      He notes he is sleeping a lot.  He gets WALKER on 1 floor and has to rest but he does not think that is a particular problem for him.    He controls GERD with Prilosec daily and prn tums.      He will try nystatin swish  and swallow for 4 days to see if that helps at all with his taste.    Notably he had a hypophysisectomy in 2010 and has postsurgical hypopituitarism.        -  Background PMH, FH, SH  His past medical history is notable for hypophyasectomy in 2010 and he is on replacement T4, cortisol.  He takes minocycline daily for rosacea and feels that as needed for GERD he has had a kidney stone in the past and has some DJD, and a hernia repair.  He describes an allergy to penicillin manifest as hives.  Family history is not particularly remarkable although his mother had lupus and his father had ALS and his sister has rheumatoid arthritis.  Social history reveals he is  with 5 adult children, is a never smoker and does not abuse alcohol or other drugs.  He works as a  and has several hobbies building a garage.  -     On exam he appeared comfortable with normal affect.  HEENT full EOMs, ptosis R-this is longstanding and unclear etiology.  No obvious thrush on tongue.  NECK no obvious goiter or mass  CHEST no respiratory distress  NEURO normal mentation and speech   PSYCH Good mood     -  On 4-7 creat 1.27, LFT OK, alb 3.6, Hb 10.6, plt 211  On 10-27 CBC OK w Hb 8.2, GNE OK w creat 1.14 though glu 124, LFT OK, alb 3.4    On 3-24 creat 1.41, alb 3.3, alk phos 181, Hb 9.5, ANC 12,100    -  I reviewed his images of 3-17-23 and there is further progression.  We discussed the images and addressed some questions.     Formal read:  CT-CAP 3-17-23  IMPRESSION:  1.  Interval progression of disease.  2.  Mild interval increase in size of anterior mediastinal mass.  3.  Several new nodules in the left upper quadrant, favoring  carcinomatosis.   4.  Left splenic mass with extrasplenic extension to involve the left  hemidiaphragm and possible left lower lung base.   5.  Mild left lower lung base consolidative volume loss, which may  represent atelectasis and possible lung involvement by the splenic  mass.  6.   Ill-defined low density in the upper pole of the left kidney as  well as loss of fat planes between the kidney and spleen, consistent  with splenic mass extension to the kidney.  7.  Additional nonacute findings as above.     -  We discussed the situation; there is the lack of a truly specific diagnosis.      He got an initial response to Keytruda but then had progression and he began ifosfamide and Doxil.  There was some response but due to toxicity he started Doxil alone in March 2022.     Due to progression he started gemzar 8-10-22.     There was no clear benefit of the gemzar and he had a lot of toxicity.  He got 1 cycle of trabectedin with a lot of toxicity  He had cholecystitis and cholecystectomy in Dec 2022.  He got XRT to the spleen mass for symptoms but unfortunately this did not help.  Pain is actually fairly well controlled and he is palliative yesterday..    He started pazopanib 2-28-23.    We addressed some questions about dosing we are starting at 800 mg a day to see how he tolerates it with weekly labs.  We will probably restage in a couple months depending on how things are going.  He did ask whether there were any other options in we discussed we could consider regulant but this is not overly likely to have response but not unreasonable.    His other diagnoses include panhypopituitarism and will be continuing those medications, the rosacea; he will continue minocycline, the GERD; he will continue as needed Prilosec.  The diagnoses of DJD, history of kidney stones and penicillin allergy are noted.       All their question addressed and will call if others arise.     Luke Wolff M.D.  Professor  Hematology, Oncology and Transplantation

## 2023-03-30 NOTE — NURSING NOTE
Is the patient currently in the state of [place]? YES    Visit mode:VIDEO    If the visit is dropped, the patient can be reconnected by: VIDEO VISIT: Send to e-mail at: [email or web address]    Will anyone else be joining the visit? NO      How would you like to obtain your AVS? MyChart    Are changes needed to the allergy or medication list? NO    Reason for visit: follow up

## 2023-03-30 NOTE — PROGRESS NOTES
Infusion Nursing Note:  Ifrah GREEN Lagi presents today for IV fluids.    Patient seen by provider today: No   present during visit today: Not Applicable.    Note: N/A.    Intravenous Access:  Implanted Port.    Treatment Conditions:  Pt requested IV fluids today. Creat was within normal limits    Post Infusion Assessment:  Patient tolerated infusion without incident.  Access discontinued per protocol.     Discharge Plan:   AVS to patient via MYCHART.  Patient will return 4/6 for next appointment.   Patient discharged in stable condition accompanied by: self.  Departure Mode: Ambulatory.      Deepika Quach RN

## 2023-03-30 NOTE — PROGRESS NOTES
Oral Chemotherapy Monitoring Program  Lab Follow Up    Reviewed lab results from 3/30/23.    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 Mariella oWlff   [facility]   Drug Name Votrient (pazopanib) Votrient (pazopanib) Votrient (pazopanib) Votrient (pazopanib) Votrient (pazopanib) Votrient (pazopanib)   Dose 800 mg 800 mg 800 mg 800 mg 800 mg 800 mg   Current Schedule Daily Daily Daily - Daily Daily   Cycle Details Continuous Continuous Continuous Continuous Continuous Continuous   Any new drug interactions? - Yes - - - -   Pharmacist Intervention? - Yes - - - -   Intervention(s) - Drug changed (non-chemo) - - - -   Is the dose as ordered appropriate for the patient? - Yes - - - -       Labs:  _  Result Component Current Result Ref Range   Sodium 140 (3/30/2023) 136 - 145 mmol/L     _  Result Component Current Result Ref Range   Potassium 3.7 (3/30/2023) 3.4 - 5.3 mmol/L     _  Result Component Current Result Ref Range   Calcium 9.4 (3/30/2023) 8.8 - 10.2 mg/dL     _  Result Component Current Result Ref Range   Magnesium 2.2 (3/30/2023) 1.7 - 2.3 mg/dL     _  Result Component Current Result Ref Range   Phosphorus 3.3 (3/30/2023) 2.5 - 4.5 mg/dL     _  Result Component Current Result Ref Range   Albumin 3.6 (3/30/2023) 3.5 - 5.2 g/dL     _  Result Component Current Result Ref Range   Urea Nitrogen 21.5 (3/30/2023) 8.0 - 23.0 mg/dL     _  Result Component Current Result Ref Range   Creatinine 1.13 (3/30/2023) 0.67 - 1.17 mg/dL     _  Result Component Current Result Ref Range   AST 20 (3/30/2023) 10 - 50 U/L     _  Result Component Current Result Ref Range   ALT 14 (3/30/2023) 10 - 50 U/L     _  Result  Component Current Result Ref Range   Bilirubin Total 0.3 (3/30/2023) <=1.2 mg/dL     _  Result Component Current Result Ref Range   WBC Count 15.0 (H) (3/30/2023) 4.0 - 11.0 10e3/uL     _  Result Component Current Result Ref Range   Hemoglobin 9.9 (L) (3/30/2023) 13.3 - 17.7 g/dL     _  Result Component Current Result Ref Range   Platelet Count 242 (3/30/2023) 150 - 450 10e3/uL     _  Result Component Current Result Ref Range   Absolute Neutrophils 12.6 (H) (3/2/2023) 1.6 - 8.3 10e3/uL     _  Result Component Current Result Ref Range   Absolute Neutrophils 12.5 (H) (3/30/2023) 1.6 - 8.3 10e3/uL        Assessment & Plan:   No concerning abnormalities.  Patient not contacted as he was seen by Dr Wolff this morning.    Follow-Up:  Labs again 4/6/23 and pharmacy to reach out for initial assessment.    Kerry Reid, PharmD, BCPS  Oral Chemotherapy Monitoring Program  [facility]  [phone]

## 2023-04-03 NOTE — TELEPHONE ENCOUNTER
"Narcotic Refill Request    Medication(s) requested: Oxycodone 5 MG  Person Requesting Refill: Ifrah Huitron  What pain is the medication treating: lower left back  How is the medication being taken?: 1.5 tablets in the morning and the evening  Does pt have enough for today? yes  Is pain being adequately controlled on the current regimen?: yes  Experiencing any side effects from medication?: constipation, but using stool softener    Date of most recent appointment:  3/30/23  Any No Show Visits: none  Next appointment:   4/14/23  Last fill date and by whom:  1/18/23 Maynor CHOW   Reviewed: no access    Routed/Paged provider: Maynor CHOW      Pt stated that he currently is not having any symptom concerns. He stated that he \"overdid it\" this past weekend at a birthday party as he stayed out too long and became very fatigued. Pt denies fever or other symptoms of infection.        "

## 2023-04-03 NOTE — TELEPHONE ENCOUNTER
[facility]: Palliative Care                                                                                        Received mychart message from pharmacy requesting refill of olanzapine.     Last refill: 1/31/2023  Last office visit: 3/28/2023  Follow up to be scheduled per check out order    Will route request to MD for review.     Signature:  HAFSA EdwardN, RN, OCN\

## 2023-04-06 NOTE — PROGRESS NOTES
PAC labs drawn without difficulty via site protocol. Patient tolerated well.    ADAN ASCENCIO RN on 4/6/2023 at 10:12 AM

## 2023-04-06 NOTE — PROGRESS NOTES
Infusion Nursing Note:  Ifrah Huitron presents today for IVF and possible blood transfusion.    Patient seen by provider today: No   present during visit today: Not Applicable.    Note: Pt fatigued, feeling weak, and slightly short of breath. Does not meet parameters for blood transfusion. BP elevated on arrival. Inbasket sent to Maynor CHOW to update. /108 prior to discharge, per GERALDO Rios pt is OK to go home, pt will be prescribed Losartan.     Intravenous Access:  Implanted Port.    Treatment Conditions:  Lab Results   Component Value Date    HGB 11.6 (L) 04/06/2023    WBC 22.2 (H) 04/06/2023    ANEU 12.6 (H) 03/02/2023    ANEUTAUTO 19.5 (H) 04/06/2023     (L) 04/06/2023      Results reviewed, labs did NOT meet treatment parameters: Hgb > 8.0.    Post Infusion Assessment:  Patient tolerated infusion without incident.  Blood return noted pre and post infusion.  Site patent and intact, free from redness, edema or discomfort.  No evidence of extravasations.  Access discontinued per protocol.     Discharge Plan:   Copy of AVS reviewed with patient and/or family.  Patient will return 4/13/23 for next appointment.  Patient discharged in stable condition accompanied by: self.  Departure Mode: Ambulatory.      ADAN ASCENCIO RN

## 2023-04-06 NOTE — TELEPHONE ENCOUNTER
Oral Chemotherapy Monitoring Program- update from patient and lab review     Subjective/Objective:  Ifrah Huitron is a 72 year old male contacted by phone for a follow-up visit for oral chemotherapy.  Ifrah states that he is feeling amazingly well with his Votrient.  He did report having a blood pressure reading of 190/107 and repeat of 158/108.  This was taken while getting his labs drawn today in clinic.  Maynor started him on losartan 25 mg daily.  He does have some weakness and dizziness.  He said that this was nothing out of the ordinary for him.  He verbalized that he takes 4 tablets (800mg) votrient at night on an empty stomach.  We reviewed his labs.  He will be taking in a bit more fluids each day.  His dg BS may have been from eating oatmeal and putting on extra sugar.  He will monitor his blood pressure and report it to clinic.         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 Mariella Wolff   [facility]   Drug Name Votrient (pazopanib) Votrient (pazopanib) Votrient (pazopanib) Votrient (pazopanib) Votrient (pazopanib) Votrient (pazopanib) Votrient (pazopanib)   Dose 800 mg 800 mg 800 mg 800 mg 800 mg 800 mg 800 mg   Current Schedule Daily Daily Daily  Daily Daily Daily   Cycle Details Continuous Continuous Continuous Continuous Continuous Continuous Continuous   Any new drug interactions?  Yes        Pharmacist Intervention?  Yes        Intervention(s)  Drug changed (non-chemo)        Is the dose as ordered appropriate for the patient?   "Yes            Vitals:  BP:   BP Readings from Last 1 Encounters:   04/06/23 (!) 190/107     Wt Readings from Last 1 Encounters:   03/27/23 63 kg (139 lb)     Estimated body surface area is 1.73 meters squared as calculated from the following:    Height as of 3/24/23: 1.702 m (5' 7\").    Weight as of 3/27/23: 63 kg (139 lb).    Labs:  _  Result Component Current Result Ref Range   Sodium 140 (4/6/2023) 136 - 145 mmol/L     _  Result Component Current Result Ref Range   Potassium 3.4 (4/6/2023) 3.4 - 5.3 mmol/L     _  Result Component Current Result Ref Range   Calcium 9.3 (4/6/2023) 8.8 - 10.2 mg/dL     _  Result Component Current Result Ref Range   Magnesium 2.2 (4/6/2023) 1.7 - 2.3 mg/dL     _  Result Component Current Result Ref Range   Phosphorus 2.6 (4/6/2023) 2.5 - 4.5 mg/dL     _  Result Component Current Result Ref Range   Albumin 3.3 (L) (4/6/2023) 3.5 - 5.2 g/dL     _  Result Component Current Result Ref Range   Urea Nitrogen 21.1 (4/6/2023) 8.0 - 23.0 mg/dL     _  Result Component Current Result Ref Range   Creatinine 1.34 (H) (4/6/2023) 0.67 - 1.17 mg/dL       _  Result Component Current Result Ref Range   AST 28 (4/6/2023) 10 - 50 U/L     _  Result Component Current Result Ref Range   ALT 18 (4/6/2023) 10 - 50 U/L     _  Result Component Current Result Ref Range   Bilirubin Total 0.4 (4/6/2023) <=1.2 mg/dL       _  Result Component Current Result Ref Range   WBC Count 22.2 (H) (4/6/2023) 4.0 - 11.0 10e3/uL     _  Result Component Current Result Ref Range   Hemoglobin 11.6 (L) (4/6/2023) 13.3 - 17.7 g/dL     _  Result Component Current Result Ref Range   Platelet Count 149 (L) (4/6/2023) 150 - 450 10e3/uL     No results found for ANC within last 30 days.     Assessment/Plan:  Labs are acceptable with no dosage changes.  His EKG was done 4/4/23. ()  Results were sent to clinic    Follow-Up:  Labs and Maynor appt on 4/13/4/14    Vishal Dent, PharmD, BCOP  April 6, 2023    "

## 2023-04-12 NOTE — PROGRESS NOTES
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 History as follows:  - March 2021 presented with chest pain and back pain, imaging with lung mets and biopsy of mediastinal mass consistent with spindle cell sarcoma with high PD-L1. Baseline imaging lung mets, left sided subdiaphragmatic mass, liver lesions.   - June 2021-October 2021 Pembrolizumab, tolerated well, stopped due to disease progression  - October 2021-February 2022 Doxil + Ifos, not tolerated well, required multiple treatment delays, IVF support, dose reductions, horrible mucositis. Stopped due to tolerance  - March 2022-August 2022 Doxil alone, not tolerated well with ongoing skin and mouth toxicity, requiring delays and dose reductions. Stopped due to disease progression  - August 2022-November 2022 Gemzar alone, not tolerated well with cytopenias and edema, stopped due to disease progression  - November 2022 Trabectedin, only had one cycle. Horrible toxicity with HUSSEIN, hepatic toxicity, cytopenias, required hospital admission. Stopped due to poor tolerance and disease progression  -December 2022 Admission for acute cholecystitis s/p lap carmelita 12/24/22, repeat admission after discharge for sepsis and pain     Plan to start Pazopanib as next line therapy for sarcoma, however haven't been able to start due to ongoing fatigue, fevers (ED visit 1/10/23 discharged on Levaquin), and poorly controlled pain.     CT 1/25/23 with slight progression of splenic mass, otherwise stable disease. As such opted to do radiation to mass and hold off on chemotherapy for the time.      He has continued off treatment. CT 3/17/23 with disease progression. Started Pazopanib 3/28/23.    Interval History:  Ifrah had to stop his Pazopanib on 4/5/23. He was spending all of his time in bed, extremely weak, dizzy.  "Increased confusion. Night sweats. Since holding treatment he is feeling better, though still not back to baseline. Taking Zofran for nausea and eating OK, though taste remains an issue. No thrush. Dizziness better, BP in 150s range on losartan. No fevers or respiratory concerns. Pain about the same, using Oxycodone and lidocaine.      Current Outpatient Medications   Medication Sig Dispense Refill     acetaminophen (TYLENOL) 325 MG tablet Take 2 tablets (650 mg) by mouth every 6 hours as needed for mild pain or other (and adjunct with moderate or severe pain or per patient request)       aspirin-acetaminophen-caffeine (EXCEDRIN MIGRAINE) 250-250-65 MG tablet Take 1 tablet by mouth daily as needed for headaches       doxepin (SINEQUAN) 10 MG/ML (HIGH CONC) solution Take 2.5 mLs (25 mg) by mouth 2 times daily as needed (rinse for mucositis) Dilute the 2.5 mL of doxepin to 5 ml total in sterile or distilled water. 118 mL 0     escitalopram (LEXAPRO) 10 MG tablet Take 1 tablet (10 mg) by mouth daily 30 tablet 0     guaiFENesin-codeine (GUAIFENESIN AC) 100-10 MG/5ML syrup Take 10 mLs by mouth every 4 hours as needed for cough 118 mL 0     hydrocortisone (CORTEF) 10 MG tablet Take 20 mg in the morning and 10 mg in the afternoon 270 tablet 2     Insulin Syringe-Needle U-100 27.5G X 5/8\" 2 ML MISC 1 each daily as needed (with solucortef for adrenal crisis) 10 each 1     levothyroxine (SYNTHROID/LEVOTHROID) 75 MCG tablet Take one tab p.o. 6 days per week and 0.5 tabs one day per week ( total of 6.5 tablets weekly) 90 tablet 2     lidocaine (XYLOCAINE) 5 % external ointment Apply topically 4 times daily as needed for moderate pain (4-6) 240 g 1     losartan (COZAAR) 25 MG tablet Take 1 tablet (25 mg) by mouth daily 30 tablet 1     OLANZapine (ZYPREXA) 5 MG tablet Take 1 tablet (5 mg) by mouth 2 times daily as needed 60 tablet 1     ondansetron (ZOFRAN) 4 MG tablet Take 1-2 tablets (4-8 mg) by mouth every 8 hours as needed " for nausea 60 tablet 3     oxyCODONE (ROXICODONE) 5 MG tablet Take 1-2 tablets (5-10 mg) by mouth every 4 hours as needed for moderate to severe pain 100 tablet 0     pazopanib (VOTRIENT) 200 MG tablet Take 4 tablets (800 mg) by mouth daily  0     phosphorus tablet 250 mg (PHOSPHA 250 NEUTRAL) 250 MG per tablet Take 1 tablet (250 mg) by mouth daily 90 tablet 3     potassium chloride ER (KLOR-CON M) 20 MEQ CR tablet Take 1 tablet (20 mEq) by mouth daily 90 tablet 3     SUMAtriptan (IMITREX) 50 MG tablet Take 1 tablet (50 mg) by mouth at onset of headache for migraine May repeat in 2 hours. Max 4 tablets/24 hours. 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     Thyroid disease     removed pituitary gland     Past Surgical History:   Procedure Laterality Date     COLONOSCOPY N/A 12/17/2020    Procedure: COLONOSCOPY;  Surgeon: Ken Camacho MD;  Location: [facility]     ENDOSCOPIC RETROGRADE CHOLANGIOPANCREATOGRAM N/A 12/23/2022    Procedure: ENDOSCOPIC RETROGRADE CHOLANGIOPANCREATOGRAPHY;  Surgeon: Jim Lamar MD;  Location: [facility] OR     ENT SURGERY       ESOPHAGOSCOPY, GASTROSCOPY, DUODENOSCOPY (EGD), COMBINED N/A 12/27/2022    Procedure: ESOPHAGOGASTRODUODENOSCOPY (EGD);  Surgeon: Brenton Francois MD;  Location: [facility] GI     HERNIA REPAIR       INSERT PORT VASCULAR ACCESS Right 1/28/2022    Procedure: INSERTION, VASCULAR ACCESS PORT;  Surgeon: Daniel Kinney MD;  Location: [facility] OR     IR CHEST PORT PLACEMENT > 5 YRS OF AGE  1/28/2022     LAPAROSCOPIC CHOLECYSTECTOMY N/A 12/24/2022    Procedure: CHOLECYSTECTOMY, LAPAROSCOPIC;  Surgeon: Froy More DO;  Location: [facility] OR     LARYNGOSCOPY, EXCISE VOCAL CORD LESION MICROSCOPIC, COMBINED Left 07/01/2021    Procedure: MICROLARYNGOSCOPY, LEFT TRUE VOCAL CORD INJECTION WITH PROLARYN;  Surgeon: Monisha  "Kyree PIERRE MD;  Location: [facility] OR     PHACOEMULSIFICATION WITH STANDARD INTRAOCULAR LENS IMPLANT Right 03/10/2021    Procedure: Cataract removal with implant.;  Surgeon: Jamir Mac MD;  Location: [facility] OR     PHACOEMULSIFICATION WITH STANDARD INTRAOCULAR LENS IMPLANT Left 04/05/2021    Procedure: Cataract removal with implant.;  Surgeon: Jamir Mac MD;  Location: [facility] OR     PICC DOUBLE LUMEN PLACEMENT Right 12/01/2021    5FR DL PICC, basilic vein. L-38cm, 1cm out.     PICC DOUBLE LUMEN PLACEMENT Right 01/04/2022    Right cephalic, 41 cm, 1 external length     PITUITARY EXCISION       tooth pulled 4/7  Right      Allergies   Allergen Reactions     Penicillins Hives and Swelling     Occurred as small child   Pt tolerated meropenem 12/23/22       Social History   Social History     Tobacco Use     Smoking status: Never     Smokeless tobacco: Never   Substance Use Topics     Alcohol use: Yes     Comment: rare     Drug use: Never      Past medical history and social history were reviewed.    Physical Examination:  BP (!) 155/81   Ht 1.702 m (5' 7\")   Wt 65.8 kg (145 lb)   BMI 22.71 kg/m    Wt Readings from Last 10 Encounters:   03/27/23 63 kg (139 lb)   03/24/23 65.8 kg (145 lb)   03/01/23 66.5 kg (146 lb 9.6 oz)   02/15/23 66.5 kg (146 lb 9.6 oz)   02/06/23 63.3 kg (139 lb 9.6 oz)   01/26/23 59.3 kg (130 lb 12.8 oz)   01/19/23 59.5 kg (131 lb 3.2 oz)   01/10/23 63.5 kg (140 lb)   01/05/23 61.5 kg (135 lb 8 oz)   12/27/22 65 kg (143 lb 6.4 oz)     Video physical exam  General: Patient appears well, fatigued in no acute distress.   Skin: No visualized rash or lesions on visualized skin  Eyes: EOMI, no erythema, sclera icterus or discharge noted  Resp: Appears to be breathing comfortably without accessory muscle usage, speaking in full sentences, no cough  MSK: Appears to have normal range of motion based on visualized movements  Neurologic: No apparent tremors, facial movements symmetric  Psych: " affect normal, alert and oriented    Laboratory Data:   Latest Reference Range & Units 04/13/23 09:50   Sodium 136 - 145 mmol/L 141   Potassium 3.4 - 5.3 mmol/L 4.2   Chloride 98 - 107 mmol/L 104   Carbon Dioxide (CO2) 22 - 29 mmol/L 29   Urea Nitrogen 8.0 - 23.0 mg/dL 18.7   Creatinine 0.67 - 1.17 mg/dL 1.19 (H)   GFR Estimate >60 mL/min/1.73m2 64   Calcium 8.8 - 10.2 mg/dL 8.8   Anion Gap 7 - 15 mmol/L 8   Magnesium 1.7 - 2.3 mg/dL 2.1   Phosphorus 2.5 - 4.5 mg/dL 2.3 (L)   Albumin 3.5 - 5.2 g/dL 3.1 (L)   Protein Total 6.4 - 8.3 g/dL 5.6 (L)   Alkaline Phosphatase 40 - 129 U/L 120   ALT 10 - 50 U/L 14   AST 10 - 50 U/L 18   Bilirubin Total <=1.2 mg/dL 0.2   Glucose 70 - 99 mg/dL 149 (H)   WBC 4.0 - 11.0 10e3/uL 16.4 (H)   Hemoglobin 13.3 - 17.7 g/dL 9.4 (L)   Hematocrit 40.0 - 53.0 % 30.7 (L)   Platelet Count 150 - 450 10e3/uL 111 (L)   RBC Count 4.40 - 5.90 10e6/uL 2.96 (L)   MCV 78 - 100 fL 104 (H)   MCH 26.5 - 33.0 pg 31.8   MCHC 31.5 - 36.5 g/dL 30.6 (L)   RDW 10.0 - 15.0 % 16.2 (H)   % Neutrophils % 89   % Lymphocytes % 5   % Monocytes % 5   % Eosinophils % 0   % Basophils % 0   Absolute Basophils 0.0 - 0.2 10e3/uL 0.0   Absolute Eosinophils 0.0 - 0.7 10e3/uL 0.0   Absolute Immature Granulocytes <=0.4 10e3/uL 0.2   Absolute Lymphocytes 0.8 - 5.3 10e3/uL 0.9   Absolute Monocytes 0.0 - 1.3 10e3/uL 0.8   % Immature Granulocytes % 1   Absolute Neutrophils 1.6 - 8.3 10e3/uL 14.6 (H)   Absolute NRBCs 10e3/uL 0.0   NRBCs per 100 WBC <1 /100 0   (H): Data is abnormally high  (L): Data is abnormally low      Assessment and Plan:  #  Metastatic spindle cell sarcoma  S/p progression on multiple lines of therapy.  Has not tolerated prior chemotherapy well. Most recently received Trabectedin x 1 cycle November 2022; complicated by HUSSEIN, hepatic toxicity, nausea, anorexia, dehydration, pancytopenia. Now more recently course complicated by acute cholecystitis s/p lap carmelita 12/24, re-admission for sepsis and anemia.     Has  been on chemo holiday since November 2022. Clinically has improved being off treatment, though cancer related pain remains an issue, even after radiation to splenic mass.     CT from 3/17/23 reviewed and he has disease progression. Started Pazopanib 3/28/23.    Did not tolerate well at all (weakness, dizziness, fatigue, slowed cognition, poor PO intake), had to hold treatment after 1 week. Continue to hold.    Will see how he does over the next week off treatment. If he improves will consider restarting Pazopanib at half dose 400mg daily. MARLEEN visit in one week.    Monitor leukocytosis and any signs of infection.      #  Chronic/recurrent mucositis   Ongoing throughout all of treatment. No thrush currently. Continue salt/soda swishes. Can trial Zinc for dysgeusia.      # Poor Oral Intake  # Dehydration  # Renal Insufficiency   # Hypokalemia  # Hypophosphatemia  Was requiring IVF 1-2 days/week in [place]. Continues to require this, is scheduled weekly. Doing OK, creat better yesterday. Monitor.      Continue weekly labs and IVF at [place]. Continue potassium and phos supplement.       # Acute LFT elevation with hyperbilirubinemia, resolving  # Obstructive cholelithiasis, s/p lap carmelita  He is 3 weeks post lap-carmelita. LFTs WNL.      # Chemo-related pancytopenia, resolved  # Acute/chronic anemia  2/2 chemotherapy. Prior anemia-no bleeding source identified on ERCP, EGD and CT abd. Surgery felt unlikely related to post-op loss following lap carmelita. Stabilized after pRBC. Holding Celebrex/NSAIDs. Could have been related to acute infectious process/reactive with sepsis. Now likely ongoing with progressive malignancy.      Hgb stable. Continue weekly monitoring and transfuse if hgb <8.       # Cancer related pain  Continue Oxycodone 5-10mg every 4 hours PRN. Continue topical lidocaine. Avoid NSAIDs. Palliative involved. Completed radiation.      # Hypopituitarism  # Hypothyroidism  Continues Hydrocortisone and  Synthroid-confirmed he is taking. Stress doses hydrocortisone with acute illness. Follows with Endo.      #   Nausea, dyspepsia  Continue Zofran and Compazine. Now also on Zyprexa and notes improved appetite. Tums PRN.      #   Hoarseness  Likely secondary to mediastinal mass. Status post vocal cord injection in July with ENT     #  Depression/anxiety  Restarted on Lexapro by palliative. Mood stable. QTc WNL.      # HTN  Stopped lisinopril for possible contribution to elevated creat and switched to Amlodipine 5mg daily. Had to start Losartan last week for elevated BP, improving. Continue Losartan 25mg daily and home BP monitoring    35 minutes spent on the date of the encounter doing chart review, review of test results, interpretation of tests, patient visit and documentation     Maynor Rios PA-C  Department of Hematology and Oncology  [facility]

## 2023-04-13 NOTE — PROGRESS NOTES
Infusion Nursing Note:  Ifrah Huitron presents today for IVF.    Patient seen by provider today: No   present during visit today: Not Applicable.    Note: Hgb 9.4, platelets 111, does not meet parameters for blood products today.     Intravenous Access:  Implanted Port.    Treatment Conditions:  Results reviewed, labs did NOT meet treatment parameters.    Post Infusion Assessment:  Patient tolerated infusion without incident.  Blood return noted pre and post infusion.  Site patent and intact, free from redness, edema or discomfort.  No evidence of extravasations.  Access discontinued per protocol.     Discharge Plan:   Patient discharged in stable condition accompanied by: self.  Departure Mode: Ambulatory.      Anup Velasco RN

## 2023-04-14 NOTE — LETTER
4/14/2023         RE: Ifrah Huitron  [id number] [address] [facility] [id number]        Dear Colleague,    Thank you for referring your patient, Ifrah Huitron, to the [facility]. Please see a copy of my visit note below.    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 History as follows:  - March 2021 presented with chest pain and back pain, imaging with lung mets and biopsy of mediastinal mass consistent with spindle cell sarcoma with high PD-L1. Baseline imaging lung mets, left sided subdiaphragmatic mass, liver lesions.   - June 2021-October 2021 Pembrolizumab, tolerated well, stopped due to disease progression  - October 2021-February 2022 Doxil + Ifos, not tolerated well, required multiple treatment delays, IVF support, dose reductions, horrible mucositis. Stopped due to tolerance  - March 2022-August 2022 Doxil alone, not tolerated well with ongoing skin and mouth toxicity, requiring delays and dose reductions. Stopped due to disease progression  - August 2022-November 2022 Gemzar alone, not tolerated well with cytopenias and edema, stopped due to disease progression  - November 2022 Trabectedin, only had one cycle. Horrible toxicity with HUSSEIN, hepatic toxicity, cytopenias, required hospital admission. Stopped due to poor tolerance and disease progression  -December 2022 Admission for acute cholecystitis s/p lap carmelita 12/24/22, repeat admission after discharge for sepsis and pain     Plan to start Pazopanib as next line therapy for sarcoma, however haven't been able to start due to ongoing fatigue, fevers (ED visit 1/10/23 discharged on Levaquin), and poorly controlled pain.     CT 1/25/23 with slight progression of splenic mass, otherwise stable disease. As such opted to do radiation to mass and hold off on  "chemotherapy for the time.      He has continued off treatment. CT 3/17/23 with disease progression. Started Pazopanib 3/28/23.    Interval History:  Ifrah had to stop his Pazopanib on 4/5/23. He was spending all of his time in bed, extremely weak, dizzy. Increased confusion. Night sweats. Since holding treatment he is feeling better, though still not back to baseline. Taking Zofran for nausea and eating OK, though taste remains an issue. No thrush. Dizziness better, BP in 150s range on losartan. No fevers or respiratory concerns. Pain about the same, using Oxycodone and lidocaine.      Current Outpatient Medications   Medication Sig Dispense Refill     acetaminophen (TYLENOL) 325 MG tablet Take 2 tablets (650 mg) by mouth every 6 hours as needed for mild pain or other (and adjunct with moderate or severe pain or per patient request)       aspirin-acetaminophen-caffeine (EXCEDRIN MIGRAINE) 250-250-65 MG tablet Take 1 tablet by mouth daily as needed for headaches       doxepin (SINEQUAN) 10 MG/ML (HIGH CONC) solution Take 2.5 mLs (25 mg) by mouth 2 times daily as needed (rinse for mucositis) Dilute the 2.5 mL of doxepin to 5 ml total in sterile or distilled water. 118 mL 0     escitalopram (LEXAPRO) 10 MG tablet Take 1 tablet (10 mg) by mouth daily 30 tablet 0     guaiFENesin-codeine (GUAIFENESIN AC) 100-10 MG/5ML syrup Take 10 mLs by mouth every 4 hours as needed for cough 118 mL 0     hydrocortisone (CORTEF) 10 MG tablet Take 20 mg in the morning and 10 mg in the afternoon 270 tablet 2     Insulin Syringe-Needle U-100 27.5G X 5/8\" 2 ML MISC 1 each daily as needed (with solucortef for adrenal crisis) 10 each 1     levothyroxine (SYNTHROID/LEVOTHROID) 75 MCG tablet Take one tab p.o. 6 days per week and 0.5 tabs one day per week ( total of 6.5 tablets weekly) 90 tablet 2     lidocaine (XYLOCAINE) 5 % external ointment Apply topically 4 times daily as needed for moderate pain (4-6) 240 g 1     losartan (COZAAR) 25 MG " tablet Take 1 tablet (25 mg) by mouth daily 30 tablet 1     OLANZapine (ZYPREXA) 5 MG tablet Take 1 tablet (5 mg) by mouth 2 times daily as needed 60 tablet 1     ondansetron (ZOFRAN) 4 MG tablet Take 1-2 tablets (4-8 mg) by mouth every 8 hours as needed for nausea 60 tablet 3     oxyCODONE (ROXICODONE) 5 MG tablet Take 1-2 tablets (5-10 mg) by mouth every 4 hours as needed for moderate to severe pain 100 tablet 0     pazopanib (VOTRIENT) 200 MG tablet Take 4 tablets (800 mg) by mouth daily  0     phosphorus tablet 250 mg (PHOSPHA 250 NEUTRAL) 250 MG per tablet Take 1 tablet (250 mg) by mouth daily 90 tablet 3     potassium chloride ER (KLOR-CON M) 20 MEQ CR tablet Take 1 tablet (20 mEq) by mouth daily 90 tablet 3     SUMAtriptan (IMITREX) 50 MG tablet Take 1 tablet (50 mg) by mouth at onset of headache for migraine May repeat in 2 hours. Max 4 tablets/24 hours. 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     Thyroid disease     removed pituitary gland     Past Surgical History:   Procedure Laterality Date     COLONOSCOPY N/A 12/17/2020    Procedure: COLONOSCOPY;  Surgeon: Ken Camacho MD;  Location: [facility]     ENDOSCOPIC RETROGRADE CHOLANGIOPANCREATOGRAM N/A 12/23/2022    Procedure: ENDOSCOPIC RETROGRADE CHOLANGIOPANCREATOGRAPHY;  Surgeon: Jim Lamar MD;  Location[facility] OR     ENT SURGERY       ESOPHAGOSCOPY, GASTROSCOPY, DUODENOSCOPY (EGD), COMBINED N/A 12/27/2022    Procedure: ESOPHAGOGASTRODUODENOSCOPY (EGD);  Surgeon: Brenton Francois MD;  Location: [facility] GI     HERNIA REPAIR       INSERT PORT VASCULAR ACCESS Right 1/28/2022    Procedure: INSERTION, VASCULAR ACCESS PORT;  Surgeon: Daniel Kinney MD;  Location: [facility] OR     IR CHEST PORT PLACEMENT > 5 YRS OF AGE  1/28/2022     LAPAROSCOPIC CHOLECYSTECTOMY N/A 12/24/2022    Procedure: CHOLECYSTECTOMY,  "LAPAROSCOPIC;  Surgeon: Froy More DO;  Location: [facility] OR     LARYNGOSCOPY, EXCISE VOCAL CORD LESION MICROSCOPIC, COMBINED Left 07/01/2021    Procedure: MICROLARYNGOSCOPY, LEFT TRUE VOCAL CORD INJECTION WITH PROLARYN;  Surgeon: Kyree Bearden MD;  Location: [facility] OR     PHACOEMULSIFICATION WITH STANDARD INTRAOCULAR LENS IMPLANT Right 03/10/2021    Procedure: Cataract removal with implant.;  Surgeon: Jamir Mac MD;  Location: [facility] OR     PHACOEMULSIFICATION WITH STANDARD INTRAOCULAR LENS IMPLANT Left 04/05/2021    Procedure: Cataract removal with implant.;  Surgeon: Jamir Mac MD;  Location: [facility] OR     PICC DOUBLE LUMEN PLACEMENT Right 12/01/2021    5FR DL PICC, basilic vein. L-38cm, 1cm out.     PICC DOUBLE LUMEN PLACEMENT Right 01/04/2022    Right cephalic, 41 cm, 1 external length     PITUITARY EXCISION       tooth pulled 4/7  Right      Allergies   Allergen Reactions     Penicillins Hives and Swelling     Occurred as small child   Pt tolerated meropenem 12/23/22       Social History   Social History     Tobacco Use     Smoking status: Never     Smokeless tobacco: Never   Substance Use Topics     Alcohol use: Yes     Comment: rare     Drug use: Never      Past medical history and social history were reviewed.    Physical Examination:  BP (!) 155/81   Ht 1.702 m (5' 7\")   Wt 65.8 kg (145 lb)   BMI 22.71 kg/m    Wt Readings from Last 10 Encounters:   03/27/23 63 kg (139 lb)   03/24/23 65.8 kg (145 lb)   03/01/23 66.5 kg (146 lb 9.6 oz)   02/15/23 66.5 kg (146 lb 9.6 oz)   02/06/23 63.3 kg (139 lb 9.6 oz)   01/26/23 59.3 kg (130 lb 12.8 oz)   01/19/23 59.5 kg (131 lb 3.2 oz)   01/10/23 63.5 kg (140 lb)   01/05/23 61.5 kg (135 lb 8 oz)   12/27/22 65 kg (143 lb 6.4 oz)     Video physical exam  General: Patient appears well, fatigued in no acute distress.   Skin: No visualized rash or lesions on visualized skin  Eyes: EOMI, no erythema, sclera icterus or discharge " noted  Resp: Appears to be breathing comfortably without accessory muscle usage, speaking in full sentences, no cough  MSK: Appears to have normal range of motion based on visualized movements  Neurologic: No apparent tremors, facial movements symmetric  Psych: affect normal, alert and oriented    Laboratory Data:   Latest Reference Range & Units 04/13/23 09:50   Sodium 136 - 145 mmol/L 141   Potassium 3.4 - 5.3 mmol/L 4.2   Chloride 98 - 107 mmol/L 104   Carbon Dioxide (CO2) 22 - 29 mmol/L 29   Urea Nitrogen 8.0 - 23.0 mg/dL 18.7   Creatinine 0.67 - 1.17 mg/dL 1.19 (H)   GFR Estimate >60 mL/min/1.73m2 64   Calcium 8.8 - 10.2 mg/dL 8.8   Anion Gap 7 - 15 mmol/L 8   Magnesium 1.7 - 2.3 mg/dL 2.1   Phosphorus 2.5 - 4.5 mg/dL 2.3 (L)   Albumin 3.5 - 5.2 g/dL 3.1 (L)   Protein Total 6.4 - 8.3 g/dL 5.6 (L)   Alkaline Phosphatase 40 - 129 U/L 120   ALT 10 - 50 U/L 14   AST 10 - 50 U/L 18   Bilirubin Total <=1.2 mg/dL 0.2   Glucose 70 - 99 mg/dL 149 (H)   WBC 4.0 - 11.0 10e3/uL 16.4 (H)   Hemoglobin 13.3 - 17.7 g/dL 9.4 (L)   Hematocrit 40.0 - 53.0 % 30.7 (L)   Platelet Count 150 - 450 10e3/uL 111 (L)   RBC Count 4.40 - 5.90 10e6/uL 2.96 (L)   MCV 78 - 100 fL 104 (H)   MCH 26.5 - 33.0 pg 31.8   MCHC 31.5 - 36.5 g/dL 30.6 (L)   RDW 10.0 - 15.0 % 16.2 (H)   % Neutrophils % 89   % Lymphocytes % 5   % Monocytes % 5   % Eosinophils % 0   % Basophils % 0   Absolute Basophils 0.0 - 0.2 10e3/uL 0.0   Absolute Eosinophils 0.0 - 0.7 10e3/uL 0.0   Absolute Immature Granulocytes <=0.4 10e3/uL 0.2   Absolute Lymphocytes 0.8 - 5.3 10e3/uL 0.9   Absolute Monocytes 0.0 - 1.3 10e3/uL 0.8   % Immature Granulocytes % 1   Absolute Neutrophils 1.6 - 8.3 10e3/uL 14.6 (H)   Absolute NRBCs 10e3/uL 0.0   NRBCs per 100 WBC <1 /100 0   (H): Data is abnormally high  (L): Data is abnormally low      Assessment and Plan:  #  Metastatic spindle cell sarcoma  S/p progression on multiple lines of therapy.  Has not tolerated prior chemotherapy well. Most  recently received Trabectedin x 1 cycle November 2022; complicated by HUSSEIN, hepatic toxicity, nausea, anorexia, dehydration, pancytopenia. Now more recently course complicated by acute cholecystitis s/p lap carmelita 12/24, re-admission for sepsis and anemia.     Has been on chemo holiday since November 2022. Clinically has improved being off treatment, though cancer related pain remains an issue, even after radiation to splenic mass.     CT from 3/17/23 reviewed and he has disease progression. Started Pazopanib 3/28/23.    Did not tolerate well at all (weakness, dizziness, fatigue, slowed cognition, poor PO intake), had to hold treatment after 1 week. Continue to hold.    Will see how he does over the next week off treatment. If he improves will consider restarting Pazopanib at half dose 400mg daily. MARLEEN visit in one week.    Monitor leukocytosis and any signs of infection.      #  Chronic/recurrent mucositis   Ongoing throughout all of treatment. No thrush currently. Continue salt/soda swishes. Can trial Zinc for dysgeusia.      # Poor Oral Intake  # Dehydration  # Renal Insufficiency   # Hypokalemia  # Hypophosphatemia  Was requiring IVF 1-2 days/week in [place]. Continues to require this, is scheduled weekly. Doing OK, creat better yesterday. Monitor.      Continue weekly labs and IVF at [place]. Continue potassium and phos supplement.       # Acute LFT elevation with hyperbilirubinemia, resolving  # Obstructive cholelithiasis, s/p lap carmelita  He is 3 weeks post lap-carmelita. LFTs WNL.      # Chemo-related pancytopenia, resolved  # Acute/chronic anemia  2/2 chemotherapy. Prior anemia-no bleeding source identified on ERCP, EGD and CT abd. Surgery felt unlikely related to post-op loss following lap carmelita. Stabilized after pRBC. Holding Celebrex/NSAIDs. Could have been related to acute infectious process/reactive with sepsis. Now likely ongoing with progressive malignancy.      Hgb stable. Continue weekly monitoring and  transfuse if hgb <8.       # Cancer related pain  Continue Oxycodone 5-10mg every 4 hours PRN. Continue topical lidocaine. Avoid NSAIDs. Palliative involved. Completed radiation.      # Hypopituitarism  # Hypothyroidism  Continues Hydrocortisone and Synthroid-confirmed he is taking. Stress doses hydrocortisone with acute illness. Follows with Endo.      #   Nausea, dyspepsia  Continue Zofran and Compazine. Now also on Zyprexa and notes improved appetite. Tums PRN.      #   Hoarseness  Likely secondary to mediastinal mass. Status post vocal cord injection in July with ENT     #  Depression/anxiety  Restarted on Lexapro by palliative. Mood stable. QTc WNL.      # HTN  Stopped lisinopril for possible contribution to elevated creat and switched to Amlodipine 5mg daily. Had to start Losartan last week for elevated BP, improving. Continue Losartan 25mg daily and home BP monitoring    35 minutes spent on the date of the encounter doing chart review, review of test results, interpretation of tests, patient visit and documentation     Maynor Rios PA-C  Department of Hematology and Oncology  [facility]       Virtual Visit Details    Type of service:  Video Visit   Video Start Time: 10:00am  Video End Time:10:20am    Originating Location (pt. Location): Home    Distant Location (provider location):  On-site  Platform used for Video Visit: Thomas      Again, thank you for allowing me to participate in the care of your patient.        Sincerely,        GERALDO Mullins

## 2023-04-14 NOTE — NURSING NOTE
Is the patient currently in the state of [place]? YES    Visit mode:VIDEO    If the visit is dropped, the patient can be reconnected by: VIDEO VISIT: Send to e-mail at: [email or web address]    Will anyone else be joining the visit?Yes       How would you like to obtain your AVS? MyChart    Are changes needed to the allergy or medication list? NO    Reason for visit: Video Visit (Follow Up )    Mona Robledo

## 2023-04-14 NOTE — PROGRESS NOTES
Virtual Visit Details    Type of service:  Video Visit   Video Start Time: 10:00am  Video End Time:10:20am    Originating Location (pt. Location): Home    Distant Location (provider location):  On-site  Platform used for Video Visit: Thomas

## 2023-04-14 NOTE — LETTER
4/14/2023         RE: Ifrah Huitron  [address] [address] [facility] [id number]        Dear Colleague,    Thank you for referring your patient, Ifrah Huitron, to the [facility]. Please see a copy of my visit note below.    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 History as follows:  - March 2021 presented with chest pain and back pain, imaging with lung mets and biopsy of mediastinal mass consistent with spindle cell sarcoma with high PD-L1. Baseline imaging lung mets, left sided subdiaphragmatic mass, liver lesions.   - June 2021-October 2021 Pembrolizumab, tolerated well, stopped due to disease progression  - October 2021-February 2022 Doxil + Ifos, not tolerated well, required multiple treatment delays, IVF support, dose reductions, horrible mucositis. Stopped due to tolerance  - March 2022-August 2022 Doxil alone, not tolerated well with ongoing skin and mouth toxicity, requiring delays and dose reductions. Stopped due to disease progression  - August 2022-November 2022 Gemzar alone, not tolerated well with cytopenias and edema, stopped due to disease progression  - November 2022 Trabectedin, only had one cycle. Horrible toxicity with HUSSEIN, hepatic toxicity, cytopenias, required hospital admission. Stopped due to poor tolerance and disease progression  -December 2022 Admission for acute cholecystitis s/p lap carmelita 12/24/22, repeat admission after discharge for sepsis and pain     Plan to start Pazopanib as next line therapy for sarcoma, however haven't been able to start due to ongoing fatigue, fevers (ED visit 1/10/23 discharged on Levaquin), and poorly controlled pain.     CT 1/25/23 with slight progression of splenic mass, otherwise stable disease. As such opted to do radiation to mass and hold off on  "chemotherapy for the time.      He has continued off treatment. CT 3/17/23 with disease progression. Started Pazopanib 3/28/23.    Interval History:  Ifrah had to stop his Pazopanib on 4/5/23. He was spending all of his time in bed, extremely weak, dizzy. Increased confusion. Night sweats. Since holding treatment he is feeling better, though still not back to baseline. Taking Zofran for nausea and eating OK, though taste remains an issue. No thrush. Dizziness better, BP in 150s range on losartan. No fevers or respiratory concerns. Pain about the same, using Oxycodone and lidocaine.      Current Outpatient Medications   Medication Sig Dispense Refill     acetaminophen (TYLENOL) 325 MG tablet Take 2 tablets (650 mg) by mouth every 6 hours as needed for mild pain or other (and adjunct with moderate or severe pain or per patient request)       aspirin-acetaminophen-caffeine (EXCEDRIN MIGRAINE) 250-250-65 MG tablet Take 1 tablet by mouth daily as needed for headaches       doxepin (SINEQUAN) 10 MG/ML (HIGH CONC) solution Take 2.5 mLs (25 mg) by mouth 2 times daily as needed (rinse for mucositis) Dilute the 2.5 mL of doxepin to 5 ml total in sterile or distilled water. 118 mL 0     escitalopram (LEXAPRO) 10 MG tablet Take 1 tablet (10 mg) by mouth daily 30 tablet 0     guaiFENesin-codeine (GUAIFENESIN AC) 100-10 MG/5ML syrup Take 10 mLs by mouth every 4 hours as needed for cough 118 mL 0     hydrocortisone (CORTEF) 10 MG tablet Take 20 mg in the morning and 10 mg in the afternoon 270 tablet 2     Insulin Syringe-Needle U-100 27.5G X 5/8\" 2 ML MISC 1 each daily as needed (with solucortef for adrenal crisis) 10 each 1     levothyroxine (SYNTHROID/LEVOTHROID) 75 MCG tablet Take one tab p.o. 6 days per week and 0.5 tabs one day per week ( total of 6.5 tablets weekly) 90 tablet 2     lidocaine (XYLOCAINE) 5 % external ointment Apply topically 4 times daily as needed for moderate pain (4-6) 240 g 1     losartan (COZAAR) 25 MG " tablet Take 1 tablet (25 mg) by mouth daily 30 tablet 1     OLANZapine (ZYPREXA) 5 MG tablet Take 1 tablet (5 mg) by mouth 2 times daily as needed 60 tablet 1     ondansetron (ZOFRAN) 4 MG tablet Take 1-2 tablets (4-8 mg) by mouth every 8 hours as needed for nausea 60 tablet 3     oxyCODONE (ROXICODONE) 5 MG tablet Take 1-2 tablets (5-10 mg) by mouth every 4 hours as needed for moderate to severe pain 100 tablet 0     pazopanib (VOTRIENT) 200 MG tablet Take 4 tablets (800 mg) by mouth daily  0     phosphorus tablet 250 mg (PHOSPHA 250 NEUTRAL) 250 MG per tablet Take 1 tablet (250 mg) by mouth daily 90 tablet 3     potassium chloride ER (KLOR-CON M) 20 MEQ CR tablet Take 1 tablet (20 mEq) by mouth daily 90 tablet 3     SUMAtriptan (IMITREX) 50 MG tablet Take 1 tablet (50 mg) by mouth at onset of headache for migraine May repeat in 2 hours. Max 4 tablets/24 hours. 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     Thyroid disease     removed pituitary gland     Past Surgical History:   Procedure Laterality Date     COLONOSCOPY N/A 12/17/2020    Procedure: COLONOSCOPY;  Surgeon: Ken Camacho MD;  Location: [facility]     ENDOSCOPIC RETROGRADE CHOLANGIOPANCREATOGRAM N/A 12/23/2022    Procedure: ENDOSCOPIC RETROGRADE CHOLANGIOPANCREATOGRAPHY;  Surgeon: Jim Lamar MD;  Location: [facility] [place] OR     ENT SURGERY       ESOPHAGOSCOPY, GASTROSCOPY, DUODENOSCOPY (EGD), COMBINED N/A 12/27/2022    Procedure: ESOPHAGOGASTRODUODENOSCOPY (EGD);  Surgeon: Brenton Francois MD;  Location: [facility] GI     HERNIA REPAIR       INSERT PORT VASCULAR ACCESS Right 1/28/2022    Procedure: INSERTION, VASCULAR ACCESS PORT;  Surgeon: Daniel Kinney MD;  Location: [facility] OR     IR CHEST PORT PLACEMENT > 5 YRS OF AGE  1/28/2022     LAPAROSCOPIC CHOLECYSTECTOMY N/A 12/24/2022    Procedure: CHOLECYSTECTOMY,  "LAPAROSCOPIC;  Surgeon: Froy More DO;  Location: [facility] [place] [facility]     LARYNGOSCOPY, EXCISE VOCAL CORD LESION MICROSCOPIC, COMBINED Left 07/01/2021    Procedure: MICROLARYNGOSCOPY, LEFT TRUE VOCAL CORD INJECTION WITH PROLARYN;  Surgeon: Kyree Bearden MD;  Location: [facility] OR     PHACOEMULSIFICATION WITH STANDARD INTRAOCULAR LENS IMPLANT Right 03/10/2021    Procedure: Cataract removal with implant.;  Surgeon: Jamir Mac MD;  Location: [facility] OR     PHACOEMULSIFICATION WITH STANDARD INTRAOCULAR LENS IMPLANT Left 04/05/2021    Procedure: Cataract removal with implant.;  Surgeon: Jamir Mac MD;  Location: [facility] OR     PICC DOUBLE LUMEN PLACEMENT Right 12/01/2021    5FR DL PICC, basilic vein. L-38cm, 1cm out.     PICC DOUBLE LUMEN PLACEMENT Right 01/04/2022    Right cephalic, 41 cm, 1 external length     PITUITARY EXCISION       tooth pulled 4/7  Right      Allergies   Allergen Reactions     Penicillins Hives and Swelling     Occurred as small child   Pt tolerated meropenem 12/23/22       Social History   Social History     Tobacco Use     Smoking status: Never     Smokeless tobacco: Never   Substance Use Topics     Alcohol use: Yes     Comment: rare     Drug use: Never      Past medical history and social history were reviewed.    Physical Examination:  BP (!) 155/81   Ht 1.702 m (5' 7\")   Wt 65.8 kg (145 lb)   BMI 22.71 kg/m    Wt Readings from Last 10 Encounters:   03/27/23 63 kg (139 lb)   03/24/23 65.8 kg (145 lb)   03/01/23 66.5 kg (146 lb 9.6 oz)   02/15/23 66.5 kg (146 lb 9.6 oz)   02/06/23 63.3 kg (139 lb 9.6 oz)   01/26/23 59.3 kg (130 lb 12.8 oz)   01/19/23 59.5 kg (131 lb 3.2 oz)   01/10/23 63.5 kg (140 lb)   01/05/23 61.5 kg (135 lb 8 oz)   12/27/22 65 kg (143 lb 6.4 oz)     Video physical exam  General: Patient appears well, fatigued in no acute distress.   Skin: No visualized rash or lesions on visualized skin  Eyes: EOMI, no erythema, sclera icterus or discharge " noted  Resp: Appears to be breathing comfortably without accessory muscle usage, speaking in full sentences, no cough  MSK: Appears to have normal range of motion based on visualized movements  Neurologic: No apparent tremors, facial movements symmetric  Psych: affect normal, alert and oriented    Laboratory Data:   Latest Reference Range & Units 04/13/23 09:50   Sodium 136 - 145 mmol/L 141   Potassium 3.4 - 5.3 mmol/L 4.2   Chloride 98 - 107 mmol/L 104   Carbon Dioxide (CO2) 22 - 29 mmol/L 29   Urea Nitrogen 8.0 - 23.0 mg/dL 18.7   Creatinine 0.67 - 1.17 mg/dL 1.19 (H)   GFR Estimate >60 mL/min/1.73m2 64   Calcium 8.8 - 10.2 mg/dL 8.8   Anion Gap 7 - 15 mmol/L 8   Magnesium 1.7 - 2.3 mg/dL 2.1   Phosphorus 2.5 - 4.5 mg/dL 2.3 (L)   Albumin 3.5 - 5.2 g/dL 3.1 (L)   Protein Total 6.4 - 8.3 g/dL 5.6 (L)   Alkaline Phosphatase 40 - 129 U/L 120   ALT 10 - 50 U/L 14   AST 10 - 50 U/L 18   Bilirubin Total <=1.2 mg/dL 0.2   Glucose 70 - 99 mg/dL 149 (H)   WBC 4.0 - 11.0 10e3/uL 16.4 (H)   Hemoglobin 13.3 - 17.7 g/dL 9.4 (L)   Hematocrit 40.0 - 53.0 % 30.7 (L)   Platelet Count 150 - 450 10e3/uL 111 (L)   RBC Count 4.40 - 5.90 10e6/uL 2.96 (L)   MCV 78 - 100 fL 104 (H)   MCH 26.5 - 33.0 pg 31.8   MCHC 31.5 - 36.5 g/dL 30.6 (L)   RDW 10.0 - 15.0 % 16.2 (H)   % Neutrophils % 89   % Lymphocytes % 5   % Monocytes % 5   % Eosinophils % 0   % Basophils % 0   Absolute Basophils 0.0 - 0.2 10e3/uL 0.0   Absolute Eosinophils 0.0 - 0.7 10e3/uL 0.0   Absolute Immature Granulocytes <=0.4 10e3/uL 0.2   Absolute Lymphocytes 0.8 - 5.3 10e3/uL 0.9   Absolute Monocytes 0.0 - 1.3 10e3/uL 0.8   % Immature Granulocytes % 1   Absolute Neutrophils 1.6 - 8.3 10e3/uL 14.6 (H)   Absolute NRBCs 10e3/uL 0.0   NRBCs per 100 WBC <1 /100 0   (H): Data is abnormally high  (L): Data is abnormally low      Assessment and Plan:  #  Metastatic spindle cell sarcoma  S/p progression on multiple lines of therapy.  Has not tolerated prior chemotherapy well. Most  recently received Trabectedin x 1 cycle November 2022; complicated by HUSSEIN, hepatic toxicity, nausea, anorexia, dehydration, pancytopenia. Now more recently course complicated by acute cholecystitis s/p lap carmelita 12/24, re-admission for sepsis and anemia.     Has been on chemo holiday since November 2022. Clinically has improved being off treatment, though cancer related pain remains an issue, even after radiation to splenic mass.     CT from 3/17/23 reviewed and he has disease progression. Started Pazopanib 3/28/23.    Did not tolerate well at all (weakness, dizziness, fatigue, slowed cognition, poor PO intake), had to hold treatment after 1 week. Continue to hold.    Will see how he does over the next week off treatment. If he improves will consider restarting Pazopanib at half dose 400mg daily. MARLEEN visit in one week.    Monitor leukocytosis and any signs of infection.      #  Chronic/recurrent mucositis   Ongoing throughout all of treatment. No thrush currently. Continue salt/soda swishes. Can trial Zinc for dysgeusia.      # Poor Oral Intake  # Dehydration  # Renal Insufficiency   # Hypokalemia  # Hypophosphatemia  Was requiring IVF 1-2 days/week in [place]. Continues to require this, is scheduled weekly. Doing OK, creat better yesterday. Monitor.      Continue weekly labs and IVF at [place]. Continue potassium and phos supplement.       # Acute LFT elevation with hyperbilirubinemia, resolving  # Obstructive cholelithiasis, s/p lap carmelita  He is 3 weeks post lap-carmelita. LFTs WNL.      # Chemo-related pancytopenia, resolved  # Acute/chronic anemia  2/2 chemotherapy. Prior anemia-no bleeding source identified on ERCP, EGD and CT abd. Surgery felt unlikely related to post-op loss following lap carmelita. Stabilized after pRBC. Holding Celebrex/NSAIDs. Could have been related to acute infectious process/reactive with sepsis. Now likely ongoing with progressive malignancy.      Hgb stable. Continue weekly monitoring and  transfuse if hgb <8.       # Cancer related pain  Continue Oxycodone 5-10mg every 4 hours PRN. Continue topical lidocaine. Avoid NSAIDs. Palliative involved. Completed radiation.      # Hypopituitarism  # Hypothyroidism  Continues Hydrocortisone and Synthroid-confirmed he is taking. Stress doses hydrocortisone with acute illness. Follows with Endo.      #   Nausea, dyspepsia  Continue Zofran and Compazine. Now also on Zyprexa and notes improved appetite. Tums PRN.      #   Hoarseness  Likely secondary to mediastinal mass. Status post vocal cord injection in July with ENT     #  Depression/anxiety  Restarted on Lexapro by palliative. Mood stable. QTc WNL.      # HTN  Stopped lisinopril for possible contribution to elevated creat and switched to Amlodipine 5mg daily. Had to start Losartan last week for elevated BP, improving. Continue Losartan 25mg daily and home BP monitoring    35 minutes spent on the date of the encounter doing chart review, review of test results, interpretation of tests, patient visit and documentation     Maynor Rios PA-C  Department of Hematology and Oncology  [facility]       Virtual Visit Details    Type of service:  Video Visit   Video Start Time: 10:00am  Video End Time:10:20am    Originating Location (pt. Location): Home    Distant Location (provider location):  On-site  Platform used for Video Visit: Thomas      Again, thank you for allowing me to participate in the care of your patient.        Sincerely,        GERALDO Mullins

## 2023-04-18 NOTE — PROGRESS NOTES
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 History as follows:  - March 2021 presented with chest pain and back pain, imaging with lung mets and biopsy of mediastinal mass consistent with spindle cell sarcoma with high PD-L1. Baseline imaging lung mets, left sided subdiaphragmatic mass, liver lesions.   - June 2021-October 2021 Pembrolizumab, tolerated well, stopped due to disease progression  - October 2021-February 2022 Doxil + Ifos, not tolerated well, required multiple treatment delays, IVF support, dose reductions, horrible mucositis. Stopped due to tolerance  - March 2022-August 2022 Doxil alone, not tolerated well with ongoing skin and mouth toxicity, requiring delays and dose reductions. Stopped due to disease progression  - August 2022-November 2022 Gemzar alone, not tolerated well with cytopenias and edema, stopped due to disease progression  - November 2022 Trabectedin, only had one cycle. Horrible toxicity with HUSSEIN, hepatic toxicity, cytopenias, required hospital admission. Stopped due to poor tolerance and disease progression  -December 2022 Admission for acute cholecystitis s/p lap carmelita 12/24/22, repeat admission after discharge for sepsis and pain     Plan to start Pazopanib as next line therapy for sarcoma, however haven't been able to start due to ongoing fatigue, fevers (ED visit 1/10/23 discharged on Levaquin), and poorly controlled pain.     CT 1/25/23 with slight progression of splenic mass, otherwise stable disease. As such opted to do radiation to mass and hold off on chemotherapy for the time.      He has continued off treatment. CT 3/17/23 with disease progression. Started Pazopanib 3/28/23. Held 4/5/23 for toxicity (fatigue, dizziness, anorexia, weakness).     Interval History:  Ifarh is doing OK. He was in the ED  "earlier in the week with hypertension, weakness, left arm weakness. Since being home his BP is better but still high (today 173/99) and he feels a little stronger but still dizzy at times and generally run down. He sleeps often throughout the day, though does feel rested when he does wake up. He is still not eating or drinking well because of poor taste but forcing himself. Restarted Nystatin for thrush.     He does get winded with stairs, though denies SOB at rest or chest pain. No fevers. Edema slightly worse. Nausea controlled with Zofran. Bowels more constipated. Taking Oxycodone for cancer relate left back pain, has needed more today.     Current Outpatient Medications   Medication Sig Dispense Refill     acetaminophen (TYLENOL) 325 MG tablet Take 2 tablets (650 mg) by mouth every 6 hours as needed for mild pain or other (and adjunct with moderate or severe pain or per patient request)       aspirin-acetaminophen-caffeine (EXCEDRIN MIGRAINE) 250-250-65 MG tablet Take 1 tablet by mouth daily as needed for headaches       doxepin (SINEQUAN) 10 MG/ML (HIGH CONC) solution Take 2.5 mLs (25 mg) by mouth 2 times daily as needed (rinse for mucositis) Dilute the 2.5 mL of doxepin to 5 ml total in sterile or distilled water. 118 mL 0     escitalopram (LEXAPRO) 10 MG tablet Take 1 tablet (10 mg) by mouth daily 30 tablet 0     guaiFENesin-codeine (GUAIFENESIN AC) 100-10 MG/5ML syrup Take 10 mLs by mouth every 4 hours as needed for cough 118 mL 0     hydrocortisone (CORTEF) 10 MG tablet Take 20 mg in the morning and 10 mg in the afternoon 270 tablet 2     Insulin Syringe-Needle U-100 27.5G X 5/8\" 2 ML MISC 1 each daily as needed (with solucortef for adrenal crisis) 10 each 1     levothyroxine (SYNTHROID/LEVOTHROID) 75 MCG tablet Take one tab p.o. 6 days per week and 0.5 tabs one day per week ( total of 6.5 tablets weekly) 90 tablet 2     lidocaine (XYLOCAINE) 5 % external ointment Apply topically 4 times daily as needed for " moderate pain (4-6) 240 g 1     losartan (COZAAR) 25 MG tablet Take 1 tablet (25 mg) by mouth daily 30 tablet 1     OLANZapine (ZYPREXA) 5 MG tablet Take 1 tablet (5 mg) by mouth 2 times daily as needed 60 tablet 1     ondansetron (ZOFRAN) 4 MG tablet Take 1-2 tablets (4-8 mg) by mouth every 8 hours as needed for nausea 60 tablet 3     oxyCODONE (ROXICODONE) 5 MG tablet Take 1-2 tablets (5-10 mg) by mouth every 4 hours as needed for moderate to severe pain 100 tablet 0     pazopanib (VOTRIENT) 200 MG tablet Take 4 tablets (800 mg) by mouth daily  0     phosphorus tablet 250 mg (PHOSPHA 250 NEUTRAL) 250 MG per tablet Take 1 tablet (250 mg) by mouth daily 90 tablet 3     potassium chloride ER (KLOR-CON M) 20 MEQ CR tablet Take 1 tablet (20 mEq) by mouth daily 90 tablet 3     SUMAtriptan (IMITREX) 50 MG tablet Take 1 tablet (50 mg) by mouth at onset of headache for migraine May repeat in 2 hours. Max 4 tablets/24 hours. 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     Thyroid disease     removed pituitary gland     Past Surgical History:   Procedure Laterality Date     COLONOSCOPY N/A 12/17/2020    Procedure: COLONOSCOPY;  Surgeon: Ken Camacho MD;  Location: [facility]     ENDOSCOPIC RETROGRADE CHOLANGIOPANCREATOGRAM N/A 12/23/2022    Procedure: ENDOSCOPIC RETROGRADE CHOLANGIOPANCREATOGRAPHY;  Surgeon: Jim Lamar MD;  Location: [facility] OR     ENT SURGERY       ESOPHAGOSCOPY, GASTROSCOPY, DUODENOSCOPY (EGD), COMBINED N/A 12/27/2022    Procedure: ESOPHAGOGASTRODUODENOSCOPY (EGD);  Surgeon: Brenton Francois MD;  Location: [facility] GI     HERNIA REPAIR       INSERT PORT VASCULAR ACCESS Right 1/28/2022    Procedure: INSERTION, VASCULAR ACCESS PORT;  Surgeon: Daniel Kinney MD;  Location: [facility] OR     IR CHEST PORT PLACEMENT > 5 YRS OF AGE  1/28/2022     LAPAROSCOPIC  CHOLECYSTECTOMY N/A 12/24/2022    Procedure: CHOLECYSTECTOMY, LAPAROSCOPIC;  Surgeon: Froy More DO;  Location: [facility] OR     LARYNGOSCOPY, EXCISE VOCAL CORD LESION MICROSCOPIC, COMBINED Left 07/01/2021    Procedure: MICROLARYNGOSCOPY, LEFT TRUE VOCAL CORD INJECTION WITH PROLARYN;  Surgeon: Kyree Bearden MD;  Location: [facility] OR     PHACOEMULSIFICATION WITH STANDARD INTRAOCULAR LENS IMPLANT Right 03/10/2021    Procedure: Cataract removal with implant.;  Surgeon: Jamir Mac MD;  Location: [facility] OR     PHACOEMULSIFICATION WITH STANDARD INTRAOCULAR LENS IMPLANT Left 04/05/2021    Procedure: Cataract removal with implant.;  Surgeon: Jamir Mac MD;  Location: [facility] OR     PICC DOUBLE LUMEN PLACEMENT Right 12/01/2021    5FR DL PICC, basilic vein. L-38cm, 1cm out.     PICC DOUBLE LUMEN PLACEMENT Right 01/04/2022    Right cephalic, 41 cm, 1 external length     PITUITARY EXCISION       tooth pulled 4/7  Right      Allergies   Allergen Reactions     Penicillins Hives and Swelling     Occurred as small child   Pt tolerated meropenem 12/23/22       Social History   Social History     Tobacco Use     Smoking status: Never     Smokeless tobacco: Never   Substance Use Topics     Alcohol use: Yes     Comment: rare     Drug use: Never      Past medical history and social history were reviewed.    Physical Examination:  There were no vitals taken for this visit.  Wt Readings from Last 10 Encounters:   04/14/23 65.8 kg (145 lb)   03/27/23 63 kg (139 lb)   03/24/23 65.8 kg (145 lb)   03/01/23 66.5 kg (146 lb 9.6 oz)   02/15/23 66.5 kg (146 lb 9.6 oz)   02/06/23 63.3 kg (139 lb 9.6 oz)   01/26/23 59.3 kg (130 lb 12.8 oz)   01/19/23 59.5 kg (131 lb 3.2 oz)   01/10/23 63.5 kg (140 lb)   01/05/23 61.5 kg (135 lb 8 oz)     Video physical exam  General: Patient appears well in no acute distress. Fatigued. Stable facial asymmetry.   Skin: No visualized rash or lesions on visualized skin  Eyes: EOMI, no  erythema, sclera icterus or discharge noted  Resp: Appears to be breathing comfortably without accessory muscle usage, speaking in full sentences, no cough  MSK: Appears to have normal range of motion based on visualized movements  Neurologic: No apparent tremors. Stable facial asymmetry.   Psych: affect normal, alert and oriented    Laboratory Data:   Latest Reference Range & Units 04/20/23 09:57   Sodium 136 - 145 mmol/L 139   Potassium 3.4 - 5.3 mmol/L 3.7   Chloride 98 - 107 mmol/L 100   Carbon Dioxide (CO2) 22 - 29 mmol/L 29   Urea Nitrogen 8.0 - 23.0 mg/dL 18.7   Creatinine 0.67 - 1.17 mg/dL 1.10   GFR Estimate >60 mL/min/1.73m2 70   Calcium 8.8 - 10.2 mg/dL 9.3   Anion Gap 7 - 15 mmol/L 10   Magnesium 1.7 - 2.3 mg/dL 2.1   Phosphorus 2.5 - 4.5 mg/dL 2.9   Albumin 3.5 - 5.2 g/dL 3.1 (L)   Protein Total 6.4 - 8.3 g/dL 6.0 (L)   Alkaline Phosphatase 40 - 129 U/L 167 (H)   ALT 10 - 50 U/L 31   AST 10 - 50 U/L 21   Bilirubin Total <=1.2 mg/dL 0.2   Glucose 70 - 99 mg/dL 193 (H)   WBC 4.0 - 11.0 10e3/uL 13.2 (H)   Hemoglobin 13.3 - 17.7 g/dL 9.3 (L)   Hematocrit 40.0 - 53.0 % 31.0 (L)   Platelet Count 150 - 450 10e3/uL 130 (L)   RBC Count 4.40 - 5.90 10e6/uL 2.99 (L)   MCV 78 - 100 fL 104 (H)   MCH 26.5 - 33.0 pg 31.1   MCHC 31.5 - 36.5 g/dL 30.0 (L)   RDW 10.0 - 15.0 % 17.3 (H)   % Neutrophils % 83   % Lymphocytes % 8   % Monocytes % 5   % Eosinophils % 0   % Basophils % 0   Absolute Basophils 0.0 - 0.2 10e3/uL 0.0   Absolute Eosinophils 0.0 - 0.7 10e3/uL 0.0   Absolute Immature Granulocytes <=0.4 10e3/uL 0.6 (H)   Absolute Lymphocytes 0.8 - 5.3 10e3/uL 1.0   Absolute Monocytes 0.0 - 1.3 10e3/uL 0.6   % Immature Granulocytes % 4   Absolute Neutrophils 1.6 - 8.3 10e3/uL 11.0 (H)   Absolute NRBCs 10e3/uL 0.0   NRBCs per 100 WBC <1 /100 0   (L): Data is abnormally low  (H): Data is abnormally high      Assessment and Plan:  #  Metastatic spindle cell sarcoma  S/p progression on multiple lines of therapy.  Has not  tolerated prior chemotherapy well. Most recently received Trabectedin x 1 cycle November 2022; complicated by HUSSEIN, hepatic toxicity, nausea, anorexia, dehydration, pancytopenia. Now more recently course complicated by acute cholecystitis s/p lap carmelita 12/24, re-admission for sepsis and anemia. Was on prolonged chemo holiday.      CT from 3/17/23 reviewed and he has disease progression. Started Pazopanib 3/28/23.     Did not tolerate well at all (weakness, dizziness, fatigue, slowed cognition, poor PO intake), had to hold treatment after 1 week. Continue to hold.     He continues to be doing poorly- weakness, dizziness, poor PO intake. BP poorly controlled.    Continue to hold chemotherapy.      #  Chronic/recurrent mucositis   Ongoing throughout all of treatment. Continue Nystatin with any signs of thrush. Continue salt/soda swishes. Can trial Zinc for dysgeusia.      # Poor Oral Intake  # Dehydration  # Renal Insufficiency   # Hypokalemia  # Hypophosphatemia  Has needed weekly IVF for months now with poor PO intake. Given hypertension issues though will reduce fluids to 500cc.      Continue weekly labs and IVF at [place]. Continue potassium and phos supplement.       # Acute LFT elevation with hyperbilirubinemia, resolved  # Obstructive cholelithiasis, s/p lap carmelita  LFTs WNL.      # Chemo-related pancytopenia, resolved  # Acute/chronic anemia  2/2 chemotherapy. Prior anemia-no bleeding source identified on ERCP, EGD and CT abd. Surgery felt unlikely related to post-op loss following lap carmelita. Stabilized after pRBC. Holding Celebrex/NSAIDs. Could have been related to acute infectious process/reactive with sepsis. Now likely ongoing with progressive malignancy.      Hgb stable. Continue weekly monitoring and transfuse if hgb <8.       # Cancer related pain  Continue Oxycodone 5-10mg every 4 hours PRN. Continue topical lidocaine. Avoid NSAIDs. Palliative involved. Completed radiation.     Reviewed increasing Senna  with increased Oxycodone use. Suspect he will need higher doses of Oxycodone as his cancer progresses off chemo.      # Hypopituitarism  # Hypothyroidism  Continues Hydrocortisone and Synthroid-confirmed he is taking. Stress doses hydrocortisone with acute illness. Follows with Endo.      #   Nausea, dyspepsia  Continue Zofran and Compazine. Now also on Zyprexa. Tums PRN.      #   Hoarseness  Likely secondary to mediastinal mass. Status post vocal cord injection in July with ENT     #  Depression/anxiety  Restarted on Lexapro by palliative. Mood stable. QTc WNL.      # HTN  With recent ED visit for hypertensive emergency, thankfully negative neuro imaging. Increase Losartan to 50mg daily and continue to monitor. Discussed going back to ED with severe hypertension (>200/100) or recurrent neuro concerns.     Need BP under good control before we can talk about restarting Pazopanib as this will increase BP further.     Did add on NT-BNP with WALKER and edema. Pending.     # Leukocytosis  Improving, no signs of infection. Monitor.     45 minutes spent on the date of the encounter doing chart review, review of test results, interpretation of tests, patient visit and documentation     Maynor Rios PA-C  Department of Hematology and Oncology  [facility]

## 2023-04-19 NOTE — ED NOTES
Pt comes in after taking blood pressure at home and it was high, took it again and it was even higher. Also notes decreased dexterity in left hand starting Sunday.

## 2023-04-19 NOTE — DISCHARGE INSTRUCTIONS
Test have been reassuring so far.  Return to the emergency department for worsening symptoms, repeated vomiting, or other concerns.  Otherwise follow-up in clinic.

## 2023-04-19 NOTE — ED TRIAGE NOTES
Pt has complaints of headache, left arm weakness, high blood pressure for past 2 days.     Triage Assessment     Row Name 04/18/23 2041       Triage Assessment (Adult)    Airway WDL WDL       Respiratory WDL    Respiratory WDL WDL       Skin Circulation/Temperature WDL    Skin Circulation/Temperature WDL WDL       Cardiac WDL    Cardiac WDL WDL       Peripheral/Neurovascular WDL    Peripheral Neurovascular WDL WDL       Cognitive/Neuro/Behavioral WDL    Cognitive/Neuro/Behavioral WDL WDL

## 2023-04-19 NOTE — ED PROVIDER NOTES
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  Patient had been undergoing chemotherapy, last dose of chemo was in early April, but this has been stopped secondary to side effects.  Patient reports that for the past 2 to 3 days he has felt that his left hand has been weak.  He states that he is having issues dropping things when he is trying to hold them.  He also complains of a right-sided headache and is noted that his blood pressure has been high.  He denies difficulty speaking or swallowing.  No weakness of his legs.  No recent trauma.          Allergies:  Allergies   Allergen Reactions     Penicillins Hives and Swelling     Occurred as small child   Pt tolerated meropenem 12/23/22         Problem List:    Patient Active Problem List    Diagnosis Date Noted     Hypercalcemia 01/23/2023     Priority: Medium     Fever, unspecified fever cause 01/10/2023     Priority: Medium     Adrenal insufficiency (H) 01/10/2023     Priority: Medium     Sepsis (H) 12/26/2022     Priority: Medium     Postoperative anemia due to acute blood loss 12/26/2022     Priority: Medium     Stage 3a chronic kidney disease (H) 12/26/2022     Priority: Medium     Elevated LFTs 12/22/2022     Priority: Medium     Acute renal failure, unspecified acute renal failure type (H) 11/25/2022     Priority: Medium     Secondary adrenal insufficiency (H) 04/25/2022     Priority: Medium     History of pituitary surgery 04/25/2022     Priority: Medium     Anemia in neoplastic disease 02/14/2022     Priority: Medium     Thrush 01/11/2022     Priority: Medium     Encounter for other specified aftercare 12/07/2021     Priority: Medium     Sarcoma (H) 12/01/2021     Priority: Medium     Chemotherapy-induced neutropenia (H) 11/04/2021     Priority:  Medium     Chemotherapy-induced nausea 11/02/2021     Priority: Medium     Hypophosphatemia 11/02/2021     Priority: Medium     Anemia 11/02/2021     Priority: Medium     Vocal fold paralysis, left 06/21/2021     Priority: Medium     Added automatically from request for surgery [id number]       Dysphonia 06/21/2021     Priority: Medium     Added automatically from request for surgery [id number]       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     Priority: Medium     Added automatically from request for surgery [id number]       Mediastinal lymphadenopathy 06/21/2021     Priority: Medium     Added automatically from request for surgery [id number]       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     Priority: Medium     Mesothelioma, malignant (H) 03/26/2021     Priority: Medium     TUYET (generalized anxiety disorder) 05/23/2017     Priority: Medium     Degeneration of lumbar or lumbosacral intervertebral disc 01/04/2016     Priority: Medium     Osteoarthritis of spine with radiculopathy, lumbar region 01/04/2016     Priority: Medium     Epidural lipomatosis 12/03/2015     Priority: Medium     Chronic lower back pain 12/03/2015     Priority: Medium     Hypopituitarism (H) 08/25/2010     Priority: Medium     Central hypothyroidism 08/16/2010     Priority: Medium     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 Thyroid started 4/10  Low cortisol [1] 5/7/10 so secondary adrenal insufficiency  hypophysectomy 8/23/10    Formatting of this note might be different from the original.  1.9 cm  Formatting of this note might be different from the original.  Macroadenoma of 1.9 cm in largest dimension noted 4/10  Likely central thyroid and HGH deficiency. Thyroid started 4/10  Low cortisol [1] 5/7/10 so secondary adrenal insufficiency  hypophysectomy 8/23/10       Eczema 01/12/2009     Priority: Medium     Calculus of kidney  09/09/2004     Priority: Medium     Rosacea 09/09/2004     Priority: Medium     Inguinal hernia 09/09/2004     Priority: Medium     Formatting of this note might be different from the original.  Epic          Past Medical History:    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     Thyroid disease        Past Surgical History:    Past Surgical History:   Procedure Laterality Date     COLONOSCOPY N/A 12/17/2020    Procedure: COLONOSCOPY;  Surgeon: Ken Camacho MD;  Location: [facility]     ENDOSCOPIC RETROGRADE CHOLANGIOPANCREATOGRAM N/A 12/23/2022    Procedure: ENDOSCOPIC RETROGRADE CHOLANGIOPANCREATOGRAPHY;  Surgeon: Jim Lamar MD;  Location: [facility]     ENT SURGERY       ESOPHAGOSCOPY, GASTROSCOPY, DUODENOSCOPY (EGD), COMBINED N/A 12/27/2022    Procedure: ESOPHAGOGASTRODUODENOSCOPY (EGD);  Surgeon: Brenton Francois MD;  Location: [facility]     HERNIA REPAIR       INSERT PORT VASCULAR ACCESS Right 1/28/2022    Procedure: INSERTION, VASCULAR ACCESS PORT;  Surgeon: Daniel Kinney MD;  [place] [facility] [place]     IR CHEST PORT PLACEMENT > 5 YRS OF AGE  1/28/2022     LAPAROSCOPIC CHOLECYSTECTOMY N/A 12/24/2022    Procedure: CHOLECYSTECTOMY, LAPAROSCOPIC;  Surgeon: Froy More DO;  Location: [facility]     LARYNGOSCOPY, EXCISE VOCAL CORD LESION MICROSCOPIC, COMBINED Left 07/01/2021    Procedure: MICROLARYNGOSCOPY, LEFT TRUE VOCAL CORD INJECTION WITH PROLARYN;  Surgeon: Kyree Bearden MD;  Location: [facility]     PHACOEMULSIFICATION WITH STANDARD INTRAOCULAR LENS IMPLANT Right 03/10/2021    Procedure: Cataract removal with implant.;  Surgeon: Jamir Mac MD;  Location: [facility]     PHACOEMULSIFICATION WITH STANDARD INTRAOCULAR LENS IMPLANT Left 04/05/2021    Procedure: Cataract removal with implant.;  Surgeon: Jamir Mac MD;  Location: [facility]     PICC DOUBLE LUMEN PLACEMENT  "Right 12/01/2021    5FR DL PICC, basilic vein. L-38cm, 1cm out.     PICC DOUBLE LUMEN PLACEMENT Right 01/04/2022    Right cephalic, 41 cm, 1 external length     PITUITARY EXCISION       tooth pulled 4/7  Right        Family History:    Family History   Problem Relation Age of Onset     Lupus Mother      ALS Father      Rheumatoid Arthritis Sister        Social History:  Marital Status:   [2]  Social History     Tobacco Use     Smoking status: Never     Smokeless tobacco: Never   Substance Use Topics     Alcohol use: Yes     Comment: rare     Drug use: Never        Medications:    acetaminophen (TYLENOL) 325 MG tablet  aspirin-acetaminophen-caffeine (EXCEDRIN MIGRAINE) 250-250-65 MG tablet  doxepin (SINEQUAN) 10 MG/ML (HIGH CONC) solution  escitalopram (LEXAPRO) 10 MG tablet  guaiFENesin-codeine (GUAIFENESIN AC) 100-10 MG/5ML syrup  hydrocortisone (CORTEF) 10 MG tablet  Insulin Syringe-Needle U-100 27.5G X 5/8\" 2 ML MISC  levothyroxine (SYNTHROID/LEVOTHROID) 75 MCG tablet  lidocaine (XYLOCAINE) 5 % external ointment  losartan (COZAAR) 25 MG tablet  OLANZapine (ZYPREXA) 5 MG tablet  ondansetron (ZOFRAN) 4 MG tablet  oxyCODONE (ROXICODONE) 5 MG tablet  pazopanib (VOTRIENT) 200 MG tablet  phosphorus tablet 250 mg (PHOSPHA 250 NEUTRAL) 250 MG per tablet  potassium chloride ER (KLOR-CON M) 20 MEQ CR tablet  SUMAtriptan (IMITREX) 50 MG tablet          Review of Systems    Physical Exam   BP: (!) 206/115  Pulse: 90  Temp: 97.7  F (36.5  C)  Resp: 15  Height: 170.2 cm (5' 7\")  Weight: 63.5 kg (140 lb)  SpO2: 98 %      Physical Exam  Vitals and nursing note reviewed.   Constitutional:       General: He is not in acute distress.     Appearance: He is well-developed. He is not ill-appearing, toxic-appearing or diaphoretic.   HENT:      Head: Normocephalic and atraumatic.      Mouth/Throat:      Lips: Pink.      Mouth: Mucous membranes are moist.      Pharynx: Oropharynx is clear. No oropharyngeal exudate.   Eyes:      " General: Lids are normal. Visual field deficit present. No scleral icterus.     Extraocular Movements: Extraocular movements intact.      Right eye: No nystagmus.      Left eye: No nystagmus.      Conjunctiva/sclera: Conjunctivae normal.      Pupils: Pupils are equal, round, and reactive to light.   Neck:      Thyroid: No thyromegaly.      Vascular: No JVD.      Trachea: No tracheal deviation.   Cardiovascular:      Rate and Rhythm: Normal rate and regular rhythm.      Pulses: Normal pulses.      Heart sounds: Normal heart sounds. No murmur heard.     No friction rub. No gallop.   Pulmonary:      Effort: Pulmonary effort is normal. No respiratory distress.      Breath sounds: Normal breath sounds.   Abdominal:      General: Bowel sounds are normal. There is no distension.      Palpations: Abdomen is soft. There is no mass.      Tenderness: There is no abdominal tenderness. There is no guarding or rebound.   Musculoskeletal:         General: No tenderness. Normal range of motion.      Cervical back: Normal range of motion and neck supple. No erythema or rigidity.      Right lower leg: No edema.      Left lower leg: No edema.   Lymphadenopathy:      Cervical: No cervical adenopathy.   Skin:     General: Skin is warm and dry.      Capillary Refill: Capillary refill takes less than 2 seconds.      Coloration: Skin is not pale.      Findings: No erythema or rash.   Neurological:      Mental Status: He is alert and oriented to person, place, and time.      Cranial Nerves: No cranial nerve deficit or dysarthria.      Sensory: No sensory deficit.      Motor: Motor function is intact. No weakness or pronator drift.      Coordination: Finger-Nose-Finger Test normal. Rapid alternating movements normal.      Comments: Patient has difficulty seeing fingers in the left upper quadrant, but no other visual deficits noted.  No aphasia noted.  No significant weakness or pronator drift of left upper extremity noted.   Psychiatric:          Mood and Affect: Mood and affect normal.         Speech: Speech normal.         Behavior: Behavior normal.         ED Course                 Procedures                  Results for orders placed or performed during the hospital encounter of 04/18/23 (from the past 24 hour(s))   CBC with Platelets & Differential    Narrative    The following orders were created for panel order CBC with Platelets & Differential.  Procedure                               Abnormality         Status                     ---------                               -----------         ------                     CBC with platelets and d...[[id number]]  Abnormal            Final result                 Please view results for these tests on the individual orders.   Basic metabolic panel   Result Value Ref Range    Sodium 140 136 - 145 mmol/L    Potassium 3.9 3.4 - 5.3 mmol/L    Chloride 103 98 - 107 mmol/L    Carbon Dioxide (CO2) 28 22 - 29 mmol/L    Anion Gap 9 7 - 15 mmol/L    Urea Nitrogen 21.9 8.0 - 23.0 mg/dL    Creatinine 1.11 0.67 - 1.17 mg/dL    Calcium 9.0 8.8 - 10.2 mg/dL    Glucose 131 (H) 70 - 99 mg/dL    GFR Estimate 70 >60 mL/min/1.73m2   INR   Result Value Ref Range    INR 1.21 (H) 0.85 - 1.15   Partial thromboplastin time   Result Value Ref Range    aPTT 37 22 - 38 Seconds   Troponin T, High Sensitivity   Result Value Ref Range    Troponin T, High Sensitivity 32 (H) <=22 ng/L   [place] Draw    Narrative    The following orders were created for panel order [place] Draw.  Procedure                               Abnormality         Status                     ---------                               -----------         ------                     Extra Red Top Tube[[id number]]                               Final result                 Please view results for these tests on the individual orders.   CBC with platelets and differential   Result Value Ref Range    WBC Count 14.8 (H) 4.0 - 11.0 10e3/uL    RBC Count 2.99 (L) 4.40 - 5.90  10e6/uL    Hemoglobin 9.5 (L) 13.3 - 17.7 g/dL    Hematocrit 31.2 (L) 40.0 - 53.0 %     (H) 78 - 100 fL    MCH 31.8 26.5 - 33.0 pg    MCHC 30.4 (L) 31.5 - 36.5 g/dL    RDW 17.5 (H) 10.0 - 15.0 %    Platelet Count 127 (L) 150 - 450 10e3/uL    % Neutrophils 87 %    % Lymphocytes 6 %    % Monocytes 5 %    % Eosinophils 0 %    % Basophils 0 %    % Immature Granulocytes 2 %    NRBCs per 100 WBC 0 <1 /100    Absolute Neutrophils 12.8 (H) 1.6 - 8.3 10e3/uL    Absolute Lymphocytes 0.9 0.8 - 5.3 10e3/uL    Absolute Monocytes 0.8 0.0 - 1.3 10e3/uL    Absolute Eosinophils 0.0 0.0 - 0.7 10e3/uL    Absolute Basophils 0.0 0.0 - 0.2 10e3/uL    Absolute Immature Granulocytes 0.3 <=0.4 10e3/uL    Absolute NRBCs 0.0 10e3/uL   Extra Red Top Tube   Result Value Ref Range    Hold Specimen JIC    CT Head w/o Contrast    Narrative    EXAM: CT HEAD W/O CONTRAST, CTA HEAD NECK W CONTRAST  LOCATION: [facility]  DATE/TIME: 4/18/2023 10:32 PM CDT    INDICATION: Acute neuro deficit, stroke suspected  COMPARISON: 09/29/2021  CONTRAST: 68 Isovue 370  TECHNIQUE: Head and neck CT angiogram with IV contrast. Noncontrast head CT followed by axial helical CT images of the head and neck vessels obtained during the arterial phase of intravenous contrast administration. Axial 2D reconstructed images and   multiplanar 3D MIP reconstructed images of the head and neck vessels were performed by the technologist. Dose reduction techniques were used. All stenosis measurements made according to NASCET criteria unless otherwise specified.    FINDINGS:   NONCONTRAST HEAD CT:   INTRACRANIAL CONTENTS: No intracranial hemorrhage, extraaxial collection, or mass effect.  No CT evidence of acute infarct. Mild to moderate presumed chronic small vessel ischemic changes. Mild to moderate generalized volume loss. No hydrocephalus.     VISUALIZED ORBITS/SINUSES/MASTOIDS: No intraorbital abnormality. No paranasal sinus mucosal disease. No  middle ear or mastoid effusion.    BONES/SOFT TISSUES: No acute abnormality.    HEAD CTA:  ANTERIOR CIRCULATION: There is moderate to severe stenosis of the left carotid terminus/M1 origin, similar to prior. Mild multifocal luminal irregularity of the left M1 segment. Moderate stenosis of the supraclinoid right ICA.. No aneurysm or AVM.    POSTERIOR CIRCULATION: No stenosis/occlusion, aneurysm, or high flow vascular malformation. Balanced vertebral arteries supply a normal basilar artery.     DURAL VENOUS SINUSES: Expected enhancement of the major dural venous sinuses.    NECK CTA:  RIGHT CAROTID: No measurable stenosis or dissection.    LEFT CAROTID: No measurable stenosis or dissection.    VERTEBRAL ARTERIES: No focal stenosis or dissection. Dominant left and smaller right vertebral arteries.    AORTIC ARCH: Classic aortic arch anatomy with no significant stenosis at the origin of the great vessels.    NONVASCULAR STRUCTURES: Unremarkable.      Impression    IMPRESSION:   HEAD CT:  No acute intracranial process.    HEAD CTA:  1.  Moderate to severe stenosis of the left carotid terminus/M1 origin, appears similar to prior.  2.  Moderate stenosis of the supraclinoid right ICA.    NECK CTA:  No measurable stenosis or dissection.     CTA Head Neck with Contrast    Narrative    EXAM: CT HEAD W/O CONTRAST, CTA HEAD NECK W CONTRAST  LOCATION: [facility]  DATE/TIME: 4/18/2023 10:32 PM CDT    INDICATION: Acute neuro deficit, stroke suspected  COMPARISON: 09/29/2021  CONTRAST: 68 Isovue 370  TECHNIQUE: Head and neck CT angiogram with IV contrast. Noncontrast head CT followed by axial helical CT images of the head and neck vessels obtained during the arterial phase of intravenous contrast administration. Axial 2D reconstructed images and   multiplanar 3D MIP reconstructed images of the head and neck vessels were performed by the technologist. Dose reduction techniques were used. All stenosis  measurements made according to NASCET criteria unless otherwise specified.    FINDINGS:   NONCONTRAST HEAD CT:   INTRACRANIAL CONTENTS: No intracranial hemorrhage, extraaxial collection, or mass effect.  No CT evidence of acute infarct. Mild to moderate presumed chronic small vessel ischemic changes. Mild to moderate generalized volume loss. No hydrocephalus.     VISUALIZED ORBITS/SINUSES/MASTOIDS: No intraorbital abnormality. No paranasal sinus mucosal disease. No middle ear or mastoid effusion.    BONES/SOFT TISSUES: No acute abnormality.    HEAD CTA:  ANTERIOR CIRCULATION: There is moderate to severe stenosis of the left carotid terminus/M1 origin, similar to prior. Mild multifocal luminal irregularity of the left M1 segment. Moderate stenosis of the supraclinoid right ICA.. No aneurysm or AVM.    POSTERIOR CIRCULATION: No stenosis/occlusion, aneurysm, or high flow vascular malformation. Balanced vertebral arteries supply a normal basilar artery.     DURAL VENOUS SINUSES: Expected enhancement of the major dural venous sinuses.    NECK CTA:  RIGHT CAROTID: No measurable stenosis or dissection.    LEFT CAROTID: No measurable stenosis or dissection.    VERTEBRAL ARTERIES: No focal stenosis or dissection. Dominant left and smaller right vertebral arteries.    AORTIC ARCH: Classic aortic arch anatomy with no significant stenosis at the origin of the great vessels.    NONVASCULAR STRUCTURES: Unremarkable.      Impression    IMPRESSION:   HEAD CT:  No acute intracranial process.    HEAD CTA:  1.  Moderate to severe stenosis of the left carotid terminus/M1 origin, appears similar to prior.  2.  Moderate stenosis of the supraclinoid right ICA.    NECK CTA:  No measurable stenosis or dissection.         Medications   sodium chloride 0.9% infusion (has no administration in time range)   iopamidol (ISOVUE-370) solution 68 mL (68 mLs Intravenous $Given 4/18/23 5841)   sodium chloride 0.9 % bag 500mL for CT scan flush use (100  mLs Intravenous $Given 4/18/23 2216)   gadobutrol (GADAVIST) injection 6 mL (6 mLs Intravenous $Given 4/18/23 7626)   diazepam (VALIUM) injection 5 mg (5 mg Intravenous $Given 4/18/23 8535)       Assessments & Plan (with Medical Decision Making)     I have reviewed the nursing notes.    I have reviewed the findings, diagnosis, plan and need for follow up with the patient.  This patient presented to the emergency department with complaints of right-sided headache, elevated blood pressure and weakness of his left arm.  No overt weakness is noted on physical exam.  There is questionable left upper quadrant visual field deficit.  Patient's face is asymmetric, but this is chronic according to patient and family.  Given his past history of cancer which he is being treated for, I was concerned not only for stroke or hemorrhage, but also metastatic disease.  Noncontrast head CT and CT angio were ordered and no evidence of bleed or other acute pathology is noted on head CT.  These images were independently reviewed by myself and I concur with radiology's findings.  Some vascular stenosis is noted on the CT angiogram which is noted to be chronic.  MRI of the brain was ordered.  This is pending.  Patient was signed out to the oncoming physician who will review MRI results and discussed with the patient.  If MRI is normal, suspect the patient could be safely discharged home.        New Prescriptions    No medications on file       Final diagnoses:   None       4/18/2023   [facility] EMERGENCY DEPT     Jey Russ MD  04/20/23 [id number]

## 2023-04-20 NOTE — PROGRESS NOTES
Infusion Nursing Note:  Ifrah Huitron presents today for IVF and possible PRBCs.    Patient seen by provider today: No   present during visit today: Not Applicable.    Note: Patient hypertensive. Was in the ER two days ago for HTN. His BP meds have recently been adjusted. Denies headache today- did have a minor headache yesterday.  Per GERALDO Monreal give 500mL NS today. Increase Losartan to 2 tablets daily. Okay to take 1 in AM and 1 tab in PM or both tablets at the same time. Patient verbalized understanding and will take addition tab today.     Intravenous Access:  Implanted Port.    Treatment Conditions:  Lab Results   Component Value Date    HGB 9.3 (L) 04/20/2023    WBC 13.2 (H) 04/20/2023    ANEU 12.6 (H) 03/02/2023    ANEUTAUTO 11.0 (H) 04/20/2023     (L) 04/20/2023   Results reviewed, labs did NOT meet treatment parameters: Hgb 9.3 and Plt 130.    Post Infusion Assessment:  Patient tolerated infusion without incident.  Blood return noted pre and post infusion.  Site patent and intact, free from redness, edema or discomfort.  No evidence of extravasations.  Access discontinued per protocol.     Discharge Plan:   Discharge instructions reviewed with: Patient.  Patient and/or family verbalized understanding of discharge instructions and all questions answered.  AVS to patient via [facility].  Patient will return 4/21/2023 for video visit with GERALDO Monreal for next appointment.   Patient discharged in stable condition accompanied by: self.  Departure Mode: Ambulatory.    Erin Lowe RN

## 2023-04-21 NOTE — LETTER
4/21/2023         RE: Ifrah Huitron  [address] [facility] [address]        Dear Colleague,    Thank you for referring your patient, Ifrah Huitron, to the [facility]. Please see a copy of my visit note below.    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 History as follows:  - March 2021 presented with chest pain and back pain, imaging with lung mets and biopsy of mediastinal mass consistent with spindle cell sarcoma with high PD-L1. Baseline imaging lung mets, left sided subdiaphragmatic mass, liver lesions.   - June 2021-October 2021 Pembrolizumab, tolerated well, stopped due to disease progression  - October 2021-February 2022 Doxil + Ifos, not tolerated well, required multiple treatment delays, IVF support, dose reductions, horrible mucositis. Stopped due to tolerance  - March 2022-August 2022 Doxil alone, not tolerated well with ongoing skin and mouth toxicity, requiring delays and dose reductions. Stopped due to disease progression  - August 2022-November 2022 Gemzar alone, not tolerated well with cytopenias and edema, stopped due to disease progression  - November 2022 Trabectedin, only had one cycle. Horrible toxicity with HUSSEIN, hepatic toxicity, cytopenias, required hospital admission. Stopped due to poor tolerance and disease progression  -December 2022 Admission for acute cholecystitis s/p lap carmelita 12/24/22, repeat admission after discharge for sepsis and pain     Plan to start Pazopanib as next line therapy for sarcoma, however haven't been able to start due to ongoing fatigue, fevers (ED visit 1/10/23 discharged on Levaquin), and poorly controlled pain.     CT 1/25/23 with slight progression of splenic mass, otherwise stable disease. As such opted to do radiation to mass and hold off on  "chemotherapy for the time.      He has continued off treatment. CT 3/17/23 with disease progression. Started Pazopanib 3/28/23. Held 4/5/23 for toxicity (fatigue, dizziness, anorexia, weakness).     Interval History:  Ifrah is doing OK. He was in the ED earlier in the week with hypertension, weakness, left arm weakness. Since being home his BP is better but still high (today 173/99) and he feels a little stronger but still dizzy at times and generally run down. He sleeps often throughout the day, though does feel rested when he does wake up. He is still not eating or drinking well because of poor taste but forcing himself. Restarted Nystatin for thrush.     He does get winded with stairs, though denies SOB at rest or chest pain. No fevers. Edema slightly worse. Nausea controlled with Zofran. Bowels more constipated. Taking Oxycodone for cancer relate left back pain, has needed more today.     Current Outpatient Medications   Medication Sig Dispense Refill     acetaminophen (TYLENOL) 325 MG tablet Take 2 tablets (650 mg) by mouth every 6 hours as needed for mild pain or other (and adjunct with moderate or severe pain or per patient request)       aspirin-acetaminophen-caffeine (EXCEDRIN MIGRAINE) 250-250-65 MG tablet Take 1 tablet by mouth daily as needed for headaches       doxepin (SINEQUAN) 10 MG/ML (HIGH CONC) solution Take 2.5 mLs (25 mg) by mouth 2 times daily as needed (rinse for mucositis) Dilute the 2.5 mL of doxepin to 5 ml total in sterile or distilled water. 118 mL 0     escitalopram (LEXAPRO) 10 MG tablet Take 1 tablet (10 mg) by mouth daily 30 tablet 0     guaiFENesin-codeine (GUAIFENESIN AC) 100-10 MG/5ML syrup Take 10 mLs by mouth every 4 hours as needed for cough 118 mL 0     hydrocortisone (CORTEF) 10 MG tablet Take 20 mg in the morning and 10 mg in the afternoon 270 tablet 2     Insulin Syringe-Needle U-100 27.5G X 5/8\" 2 ML MISC 1 each daily as needed (with solucortef for adrenal crisis) 10 each 1 "     levothyroxine (SYNTHROID/LEVOTHROID) 75 MCG tablet Take one tab p.o. 6 days per week and 0.5 tabs one day per week ( total of 6.5 tablets weekly) 90 tablet 2     lidocaine (XYLOCAINE) 5 % external ointment Apply topically 4 times daily as needed for moderate pain (4-6) 240 g 1     losartan (COZAAR) 25 MG tablet Take 1 tablet (25 mg) by mouth daily 30 tablet 1     OLANZapine (ZYPREXA) 5 MG tablet Take 1 tablet (5 mg) by mouth 2 times daily as needed 60 tablet 1     ondansetron (ZOFRAN) 4 MG tablet Take 1-2 tablets (4-8 mg) by mouth every 8 hours as needed for nausea 60 tablet 3     oxyCODONE (ROXICODONE) 5 MG tablet Take 1-2 tablets (5-10 mg) by mouth every 4 hours as needed for moderate to severe pain 100 tablet 0     pazopanib (VOTRIENT) 200 MG tablet Take 4 tablets (800 mg) by mouth daily  0     phosphorus tablet 250 mg (PHOSPHA 250 NEUTRAL) 250 MG per tablet Take 1 tablet (250 mg) by mouth daily 90 tablet 3     potassium chloride ER (KLOR-CON M) 20 MEQ CR tablet Take 1 tablet (20 mEq) by mouth daily 90 tablet 3     SUMAtriptan (IMITREX) 50 MG tablet Take 1 tablet (50 mg) by mouth at onset of headache for migraine May repeat in 2 hours. Max 4 tablets/24 hours. 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     Thyroid disease     removed pituitary gland     Past Surgical History:   Procedure Laterality Date     COLONOSCOPY N/A 12/17/2020    Procedure: COLONOSCOPY;  Surgeon: Ken Camacho MD;  Location: [facility]     ENDOSCOPIC RETROGRADE CHOLANGIOPANCREATOGRAM N/A 12/23/2022    Procedure: ENDOSCOPIC RETROGRADE CHOLANGIOPANCREATOGRAPHY;  Surgeon: Jim Lamar MD;  Location: [facility] [place]     ENT SURGERY       ESOPHAGOSCOPY, GASTROSCOPY, DUODENOSCOPY (EGD), COMBINED N/A 12/27/2022    Procedure: ESOPHAGOGASTRODUODENOSCOPY (EGD);  Surgeon: Brenton Francois MD;  Location:   [facility] GI     HERNIA REPAIR       INSERT PORT VASCULAR ACCESS Right 1/28/2022    Procedure: INSERTION, VASCULAR ACCESS PORT;  Surgeon: Daniel Kinney MD;  Location: [facility] OR     IR CHEST PORT PLACEMENT > 5 YRS OF AGE  1/28/2022     LAPAROSCOPIC CHOLECYSTECTOMY N/A 12/24/2022    Procedure: CHOLECYSTECTOMY, LAPAROSCOPIC;  Surgeon: Froy More DO;  Location: [facility] OR     LARYNGOSCOPY, EXCISE VOCAL CORD LESION MICROSCOPIC, COMBINED Left 07/01/2021    Procedure: MICROLARYNGOSCOPY, LEFT TRUE VOCAL CORD INJECTION WITH PROLARYN;  Surgeon: Kyree Bearden MD;  Location: [facility] OR     PHACOEMULSIFICATION WITH STANDARD INTRAOCULAR LENS IMPLANT Right 03/10/2021    Procedure: Cataract removal with implant.;  Surgeon: Jamir Mac MD;  Location: [facility] OR     PHACOEMULSIFICATION WITH STANDARD INTRAOCULAR LENS IMPLANT Left 04/05/2021    Procedure: Cataract removal with implant.;  Surgeon: Jamir Mac MD;  Location: [facility] OR     PICC DOUBLE LUMEN PLACEMENT Right 12/01/2021    5FR DL PICC, basilic vein. L-38cm, 1cm out.     PICC DOUBLE LUMEN PLACEMENT Right 01/04/2022    Right cephalic, 41 cm, 1 external length     PITUITARY EXCISION       tooth pulled 4/7  Right      Allergies   Allergen Reactions     Penicillins Hives and Swelling     Occurred as small child   Pt tolerated meropenem 12/23/22       Social History   Social History     Tobacco Use     Smoking status: Never     Smokeless tobacco: Never   Substance Use Topics     Alcohol use: Yes     Comment: rare     Drug use: Never      Past medical history and social history were reviewed.    Physical Examination:  There were no vitals taken for this visit.  Wt Readings from Last 10 Encounters:   04/14/23 65.8 kg (145 lb)   03/27/23 63 kg (139 lb)   03/24/23 65.8 kg (145 lb)   03/01/23 66.5 kg (146 lb 9.6 oz)   02/15/23 66.5 kg (146 lb 9.6 oz)   02/06/23 63.3 kg (139 lb 9.6 oz)   01/26/23 59.3 kg (130 lb 12.8 oz)   01/19/23 59.5 kg (131 lb 3.2 oz)    01/10/23 63.5 kg (140 lb)   01/05/23 61.5 kg (135 lb 8 oz)     Video physical exam  General: Patient appears well in no acute distress. Fatigued. Stable facial asymmetry.   Skin: No visualized rash or lesions on visualized skin  Eyes: EOMI, no erythema, sclera icterus or discharge noted  Resp: Appears to be breathing comfortably without accessory muscle usage, speaking in full sentences, no cough  MSK: Appears to have normal range of motion based on visualized movements  Neurologic: No apparent tremors. Stable facial asymmetry.   Psych: affect normal, alert and oriented    Laboratory Data:   Latest Reference Range & Units 04/20/23 09:57   Sodium 136 - 145 mmol/L 139   Potassium 3.4 - 5.3 mmol/L 3.7   Chloride 98 - 107 mmol/L 100   Carbon Dioxide (CO2) 22 - 29 mmol/L 29   Urea Nitrogen 8.0 - 23.0 mg/dL 18.7   Creatinine 0.67 - 1.17 mg/dL 1.10   GFR Estimate >60 mL/min/1.73m2 70   Calcium 8.8 - 10.2 mg/dL 9.3   Anion Gap 7 - 15 mmol/L 10   Magnesium 1.7 - 2.3 mg/dL 2.1   Phosphorus 2.5 - 4.5 mg/dL 2.9   Albumin 3.5 - 5.2 g/dL 3.1 (L)   Protein Total 6.4 - 8.3 g/dL 6.0 (L)   Alkaline Phosphatase 40 - 129 U/L 167 (H)   ALT 10 - 50 U/L 31   AST 10 - 50 U/L 21   Bilirubin Total <=1.2 mg/dL 0.2   Glucose 70 - 99 mg/dL 193 (H)   WBC 4.0 - 11.0 10e3/uL 13.2 (H)   Hemoglobin 13.3 - 17.7 g/dL 9.3 (L)   Hematocrit 40.0 - 53.0 % 31.0 (L)   Platelet Count 150 - 450 10e3/uL 130 (L)   RBC Count 4.40 - 5.90 10e6/uL 2.99 (L)   MCV 78 - 100 fL 104 (H)   MCH 26.5 - 33.0 pg 31.1   MCHC 31.5 - 36.5 g/dL 30.0 (L)   RDW 10.0 - 15.0 % 17.3 (H)   % Neutrophils % 83   % Lymphocytes % 8   % Monocytes % 5   % Eosinophils % 0   % Basophils % 0   Absolute Basophils 0.0 - 0.2 10e3/uL 0.0   Absolute Eosinophils 0.0 - 0.7 10e3/uL 0.0   Absolute Immature Granulocytes <=0.4 10e3/uL 0.6 (H)   Absolute Lymphocytes 0.8 - 5.3 10e3/uL 1.0   Absolute Monocytes 0.0 - 1.3 10e3/uL 0.6   % Immature Granulocytes % 4   Absolute Neutrophils 1.6 - 8.3 10e3/uL  11.0 (H)   Absolute NRBCs 10e3/uL 0.0   NRBCs per 100 WBC <1 /100 0   (L): Data is abnormally low  (H): Data is abnormally high      Assessment and Plan:  #  Metastatic spindle cell sarcoma  S/p progression on multiple lines of therapy.  Has not tolerated prior chemotherapy well. Most recently received Trabectedin x 1 cycle November 2022; complicated by HUSSEIN, hepatic toxicity, nausea, anorexia, dehydration, pancytopenia. Now more recently course complicated by acute cholecystitis s/p lap carmelita 12/24, re-admission for sepsis and anemia. Was on prolonged chemo holiday.      CT from 3/17/23 reviewed and he has disease progression. Started Pazopanib 3/28/23.     Did not tolerate well at all (weakness, dizziness, fatigue, slowed cognition, poor PO intake), had to hold treatment after 1 week. Continue to hold.     He continues to be doing poorly- weakness, dizziness, poor PO intake. BP poorly controlled.    Continue to hold chemotherapy.      #  Chronic/recurrent mucositis   Ongoing throughout all of treatment. Continue Nystatin with any signs of thrush. Continue salt/soda swishes. Can trial Zinc for dysgeusia.      # Poor Oral Intake  # Dehydration  # Renal Insufficiency   # Hypokalemia  # Hypophosphatemia  Has needed weekly IVF for months now with poor PO intake. Given hypertension issues though will reduce fluids to 500cc.      Continue weekly labs and IVF at [place]. Continue potassium and phos supplement.       # Acute LFT elevation with hyperbilirubinemia, resolved  # Obstructive cholelithiasis, s/p lap carmelita  LFTs WNL.      # Chemo-related pancytopenia, resolved  # Acute/chronic anemia  2/2 chemotherapy. Prior anemia-no bleeding source identified on ERCP, EGD and CT abd. Surgery felt unlikely related to post-op loss following lap carmelita. Stabilized after pRBC. Holding Celebrex/NSAIDs. Could have been related to acute infectious process/reactive with sepsis. Now likely ongoing with progressive malignancy.       Hgb stable. Continue weekly monitoring and transfuse if hgb <8.       # Cancer related pain  Continue Oxycodone 5-10mg every 4 hours PRN. Continue topical lidocaine. Avoid NSAIDs. Palliative involved. Completed radiation.     Reviewed increasing Senna with increased Oxycodone use. Suspect he will need higher doses of Oxycodone as his cancer progresses off chemo.      # Hypopituitarism  # Hypothyroidism  Continues Hydrocortisone and Synthroid-confirmed he is taking. Stress doses hydrocortisone with acute illness. Follows with Endo.      #   Nausea, dyspepsia  Continue Zofran and Compazine. Now also on Zyprexa. Tums PRN.      #   Hoarseness  Likely secondary to mediastinal mass. Status post vocal cord injection in July with ENT     #  Depression/anxiety  Restarted on Lexapro by palliative. Mood stable. QTc WNL.      # HTN  With recent ED visit for hypertensive emergency, thankfully negative neuro imaging. Increase Losartan to 50mg daily and continue to monitor. Discussed going back to ED with severe hypertension (>200/100) or recurrent neuro concerns.     Need BP under good control before we can talk about restarting Pazopanib as this will increase BP further.     Did add on NT-BNP with WALKER and edema. Pending.     # Leukocytosis  Improving, no signs of infection. Monitor.     45 minutes spent on the date of the encounter doing chart review, review of test results, interpretation of tests, patient visit and documentation     Maynor Rios PA-C  Department of Hematology and Oncology  [facility]         Again, thank you for allowing me to participate in the care of your patient.        Sincerely,        GERALDO Mullins

## 2023-04-21 NOTE — NURSING NOTE
Is the patient currently in the state of [place]? YES    Visit mode:VIDEO    If the visit is dropped, the patient can be reconnected by: VIDEO VISIT: Text to cell phone: [phone]    Will anyone else be joining the visit? NO      How would you like to obtain your AVS? MyChart    Are changes needed to the allergy or medication list? NO    Reason for visit: Video Visit (Follow up )

## 2023-04-21 NOTE — LETTER
4/21/2023         RE: Ifrah Huitron  [address] [address] [facility] [address]        Dear Colleague,    Thank you for referring your patient, Ifrah Huitron, to the [facility]. Please see a copy of my visit note below.    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 History as follows:  - March 2021 presented with chest pain and back pain, imaging with lung mets and biopsy of mediastinal mass consistent with spindle cell sarcoma with high PD-L1. Baseline imaging lung mets, left sided subdiaphragmatic mass, liver lesions.   - June 2021-October 2021 Pembrolizumab, tolerated well, stopped due to disease progression  - October 2021-February 2022 Doxil + Ifos, not tolerated well, required multiple treatment delays, IVF support, dose reductions, horrible mucositis. Stopped due to tolerance  - March 2022-August 2022 Doxil alone, not tolerated well with ongoing skin and mouth toxicity, requiring delays and dose reductions. Stopped due to disease progression  - August 2022-November 2022 Gemzar alone, not tolerated well with cytopenias and edema, stopped due to disease progression  - November 2022 Trabectedin, only had one cycle. Horrible toxicity with HUSSEIN, hepatic toxicity, cytopenias, required hospital admission. Stopped due to poor tolerance and disease progression  -December 2022 Admission for acute cholecystitis s/p lap carmelita 12/24/22, repeat admission after discharge for sepsis and pain     Plan to start Pazopanib as next line therapy for sarcoma, however haven't been able to start due to ongoing fatigue, fevers (ED visit 1/10/23 discharged on Levaquin), and poorly controlled pain.     CT 1/25/23 with slight progression of splenic mass, otherwise stable disease. As such opted to do radiation to mass and hold off on  "chemotherapy for the time.      He has continued off treatment. CT 3/17/23 with disease progression. Started Pazopanib 3/28/23. Held 4/5/23 for toxicity (fatigue, dizziness, anorexia, weakness).     Interval History:  Ifrah is doing OK. He was in the ED earlier in the week with hypertension, weakness, left arm weakness. Since being home his BP is better but still high (today 173/99) and he feels a little stronger but still dizzy at times and generally run down. He sleeps often throughout the day, though does feel rested when he does wake up. He is still not eating or drinking well because of poor taste but forcing himself. Restarted Nystatin for thrush.     He does get winded with stairs, though denies SOB at rest or chest pain. No fevers. Edema slightly worse. Nausea controlled with Zofran. Bowels more constipated. Taking Oxycodone for cancer relate left back pain, has needed more today.     Current Outpatient Medications   Medication Sig Dispense Refill     acetaminophen (TYLENOL) 325 MG tablet Take 2 tablets (650 mg) by mouth every 6 hours as needed for mild pain or other (and adjunct with moderate or severe pain or per patient request)       aspirin-acetaminophen-caffeine (EXCEDRIN MIGRAINE) 250-250-65 MG tablet Take 1 tablet by mouth daily as needed for headaches       doxepin (SINEQUAN) 10 MG/ML (HIGH CONC) solution Take 2.5 mLs (25 mg) by mouth 2 times daily as needed (rinse for mucositis) Dilute the 2.5 mL of doxepin to 5 ml total in sterile or distilled water. 118 mL 0     escitalopram (LEXAPRO) 10 MG tablet Take 1 tablet (10 mg) by mouth daily 30 tablet 0     guaiFENesin-codeine (GUAIFENESIN AC) 100-10 MG/5ML syrup Take 10 mLs by mouth every 4 hours as needed for cough 118 mL 0     hydrocortisone (CORTEF) 10 MG tablet Take 20 mg in the morning and 10 mg in the afternoon 270 tablet 2     Insulin Syringe-Needle U-100 27.5G X 5/8\" 2 ML MISC 1 each daily as needed (with solucortef for adrenal crisis) 10 each 1 "     levothyroxine (SYNTHROID/LEVOTHROID) 75 MCG tablet Take one tab p.o. 6 days per week and 0.5 tabs one day per week ( total of 6.5 tablets weekly) 90 tablet 2     lidocaine (XYLOCAINE) 5 % external ointment Apply topically 4 times daily as needed for moderate pain (4-6) 240 g 1     losartan (COZAAR) 25 MG tablet Take 1 tablet (25 mg) by mouth daily 30 tablet 1     OLANZapine (ZYPREXA) 5 MG tablet Take 1 tablet (5 mg) by mouth 2 times daily as needed 60 tablet 1     ondansetron (ZOFRAN) 4 MG tablet Take 1-2 tablets (4-8 mg) by mouth every 8 hours as needed for nausea 60 tablet 3     oxyCODONE (ROXICODONE) 5 MG tablet Take 1-2 tablets (5-10 mg) by mouth every 4 hours as needed for moderate to severe pain 100 tablet 0     pazopanib (VOTRIENT) 200 MG tablet Take 4 tablets (800 mg) by mouth daily  0     phosphorus tablet 250 mg (PHOSPHA 250 NEUTRAL) 250 MG per tablet Take 1 tablet (250 mg) by mouth daily 90 tablet 3     potassium chloride ER (KLOR-CON M) 20 MEQ CR tablet Take 1 tablet (20 mEq) by mouth daily 90 tablet 3     SUMAtriptan (IMITREX) 50 MG tablet Take 1 tablet (50 mg) by mouth at onset of headache for migraine May repeat in 2 hours. Max 4 tablets/24 hours. 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     Thyroid disease     removed pituitary gland     Past Surgical History:   Procedure Laterality Date     COLONOSCOPY N/A 12/17/2020    Procedure: COLONOSCOPY;  Surgeon: Ken Camacho MD;  [facility]: [facility]     ENDOSCOPIC RETROGRADE CHOLANGIOPANCREATOGRAM N/A 12/23/2022    Procedure: ENDOSCOPIC RETROGRADE CHOLANGIOPANCREATOGRAPHY;  Surgeon: Jim Lamar MD;  [facility]     ENT SURGERY       ESOPHAGOSCOPY, GASTROSCOPY, DUODENOSCOPY (EGD), COMBINED N/A 12/27/2022    Procedure: ESOPHAGOGASTRODUODENOSCOPY (EGD);  Surgeon: Brenton Francois MD;  Location:   [facility] GI     HERNIA REPAIR       INSERT PORT VASCULAR ACCESS Right 1/28/2022    Procedure: INSERTION, VASCULAR ACCESS PORT;  Surgeon: Daniel Kinney MD;  Location: [facility] OR     IR CHEST PORT PLACEMENT > 5 YRS OF AGE  1/28/2022     LAPAROSCOPIC CHOLECYSTECTOMY N/A 12/24/2022    Procedure: CHOLECYSTECTOMY, LAPAROSCOPIC;  Surgeon: Froy More DO;  Location: [facility] OR     LARYNGOSCOPY, EXCISE VOCAL CORD LESION MICROSCOPIC, COMBINED Left 07/01/2021    Procedure: MICROLARYNGOSCOPY, LEFT TRUE VOCAL CORD INJECTION WITH PROLARYN;  Surgeon: Kyree Bearden MD;  Location: [facility] OR     PHACOEMULSIFICATION WITH STANDARD INTRAOCULAR LENS IMPLANT Right 03/10/2021    Procedure: Cataract removal with implant.;  Surgeon: Jamir Mac MD;  Location: [facility] OR     PHACOEMULSIFICATION WITH STANDARD INTRAOCULAR LENS IMPLANT Left 04/05/2021    Procedure: Cataract removal with implant.;  Surgeon: Jamir Mac MD;  Location: [facility] OR     PICC DOUBLE LUMEN PLACEMENT Right 12/01/2021    5FR DL PICC, basilic vein. L-38cm, 1cm out.     PICC DOUBLE LUMEN PLACEMENT Right 01/04/2022    Right cephalic, 41 cm, 1 external length     PITUITARY EXCISION       tooth pulled 4/7  Right      Allergies   Allergen Reactions     Penicillins Hives and Swelling     Occurred as small child   Pt tolerated meropenem 12/23/22       Social History   Social History     Tobacco Use     Smoking status: Never     Smokeless tobacco: Never   Substance Use Topics     Alcohol use: Yes     Comment: rare     Drug use: Never      Past medical history and social history were reviewed.    Physical Examination:  There were no vitals taken for this visit.  Wt Readings from Last 10 Encounters:   04/14/23 65.8 kg (145 lb)   03/27/23 63 kg (139 lb)   03/24/23 65.8 kg (145 lb)   03/01/23 66.5 kg (146 lb 9.6 oz)   02/15/23 66.5 kg (146 lb 9.6 oz)   02/06/23 63.3 kg (139 lb 9.6 oz)   01/26/23 59.3 kg (130 lb 12.8 oz)   01/19/23 59.5 kg (131 lb 3.2 oz)    01/10/23 63.5 kg (140 lb)   01/05/23 61.5 kg (135 lb 8 oz)     Video physical exam  General: Patient appears well in no acute distress. Fatigued. Stable facial asymmetry.   Skin: No visualized rash or lesions on visualized skin  Eyes: EOMI, no erythema, sclera icterus or discharge noted  Resp: Appears to be breathing comfortably without accessory muscle usage, speaking in full sentences, no cough  MSK: Appears to have normal range of motion based on visualized movements  Neurologic: No apparent tremors. Stable facial asymmetry.   Psych: affect normal, alert and oriented    Laboratory Data:   Latest Reference Range & Units 04/20/23 09:57   Sodium 136 - 145 mmol/L 139   Potassium 3.4 - 5.3 mmol/L 3.7   Chloride 98 - 107 mmol/L 100   Carbon Dioxide (CO2) 22 - 29 mmol/L 29   Urea Nitrogen 8.0 - 23.0 mg/dL 18.7   Creatinine 0.67 - 1.17 mg/dL 1.10   GFR Estimate >60 mL/min/1.73m2 70   Calcium 8.8 - 10.2 mg/dL 9.3   Anion Gap 7 - 15 mmol/L 10   Magnesium 1.7 - 2.3 mg/dL 2.1   Phosphorus 2.5 - 4.5 mg/dL 2.9   Albumin 3.5 - 5.2 g/dL 3.1 (L)   Protein Total 6.4 - 8.3 g/dL 6.0 (L)   Alkaline Phosphatase 40 - 129 U/L 167 (H)   ALT 10 - 50 U/L 31   AST 10 - 50 U/L 21   Bilirubin Total <=1.2 mg/dL 0.2   Glucose 70 - 99 mg/dL 193 (H)   WBC 4.0 - 11.0 10e3/uL 13.2 (H)   Hemoglobin 13.3 - 17.7 g/dL 9.3 (L)   Hematocrit 40.0 - 53.0 % 31.0 (L)   Platelet Count 150 - 450 10e3/uL 130 (L)   RBC Count 4.40 - 5.90 10e6/uL 2.99 (L)   MCV 78 - 100 fL 104 (H)   MCH 26.5 - 33.0 pg 31.1   MCHC 31.5 - 36.5 g/dL 30.0 (L)   RDW 10.0 - 15.0 % 17.3 (H)   % Neutrophils % 83   % Lymphocytes % 8   % Monocytes % 5   % Eosinophils % 0   % Basophils % 0   Absolute Basophils 0.0 - 0.2 10e3/uL 0.0   Absolute Eosinophils 0.0 - 0.7 10e3/uL 0.0   Absolute Immature Granulocytes <=0.4 10e3/uL 0.6 (H)   Absolute Lymphocytes 0.8 - 5.3 10e3/uL 1.0   Absolute Monocytes 0.0 - 1.3 10e3/uL 0.6   % Immature Granulocytes % 4   Absolute Neutrophils 1.6 - 8.3 10e3/uL  11.0 (H)   Absolute NRBCs 10e3/uL 0.0   NRBCs per 100 WBC <1 /100 0   (L): Data is abnormally low  (H): Data is abnormally high      Assessment and Plan:  #  Metastatic spindle cell sarcoma  S/p progression on multiple lines of therapy.  Has not tolerated prior chemotherapy well. Most recently received Trabectedin x 1 cycle November 2022; complicated by HUSSEIN, hepatic toxicity, nausea, anorexia, dehydration, pancytopenia. Now more recently course complicated by acute cholecystitis s/p lap carmelita 12/24, re-admission for sepsis and anemia. Was on prolonged chemo holiday.      CT from 3/17/23 reviewed and he has disease progression. Started Pazopanib 3/28/23.     Did not tolerate well at all (weakness, dizziness, fatigue, slowed cognition, poor PO intake), had to hold treatment after 1 week. Continue to hold.     He continues to be doing poorly- weakness, dizziness, poor PO intake. BP poorly controlled.    Continue to hold chemotherapy.      #  Chronic/recurrent mucositis   Ongoing throughout all of treatment. Continue Nystatin with any signs of thrush. Continue salt/soda swishes. Can trial Zinc for dysgeusia.      # Poor Oral Intake  # Dehydration  # Renal Insufficiency   # Hypokalemia  # Hypophosphatemia  Has needed weekly IVF for months now with poor PO intake. Given hypertension issues though will reduce fluids to 500cc.      Continue weekly labs and IVF at [place]. Continue potassium and phos supplement.       # Acute LFT elevation with hyperbilirubinemia, resolved  # Obstructive cholelithiasis, s/p lap carmelita  LFTs WNL.      # Chemo-related pancytopenia, resolved  # Acute/chronic anemia  2/2 chemotherapy. Prior anemia-no bleeding source identified on ERCP, EGD and CT abd. Surgery felt unlikely related to post-op loss following lap carmelita. Stabilized after pRBC. Holding Celebrex/NSAIDs. Could have been related to acute infectious process/reactive with sepsis. Now likely ongoing with progressive malignancy.       Hgb stable. Continue weekly monitoring and transfuse if hgb <8.       # Cancer related pain  Continue Oxycodone 5-10mg every 4 hours PRN. Continue topical lidocaine. Avoid NSAIDs. Palliative involved. Completed radiation.     Reviewed increasing Senna with increased Oxycodone use. Suspect he will need higher doses of Oxycodone as his cancer progresses off chemo.      # Hypopituitarism  # Hypothyroidism  Continues Hydrocortisone and Synthroid-confirmed he is taking. Stress doses hydrocortisone with acute illness. Follows with Endo.      #   Nausea, dyspepsia  Continue Zofran and Compazine. Now also on Zyprexa. Tums PRN.      #   Hoarseness  Likely secondary to mediastinal mass. Status post vocal cord injection in July with ENT     #  Depression/anxiety  Restarted on Lexapro by palliative. Mood stable. QTc WNL.      # HTN  With recent ED visit for hypertensive emergency, thankfully negative neuro imaging. Increase Losartan to 50mg daily and continue to monitor. Discussed going back to ED with severe hypertension (>200/100) or recurrent neuro concerns.     Need BP under good control before we can talk about restarting Pazopanib as this will increase BP further.     Did add on NT-BNP with WALKER and edema. Pending.     # Leukocytosis  Improving, no signs of infection. Monitor.     45 minutes spent on the date of the encounter doing chart review, review of test results, interpretation of tests, patient visit and documentation     Maynor Rios PA-C  Department of Hematology and Oncology  [facility]         Again, thank you for allowing me to participate in the care of your patient.        Sincerely,        GERALDO Mullins

## 2023-04-25 NOTE — LETTER
4/25/2023       RE: Ifrah Huitron  [address] [address] [facility] [address]     Dear Colleague,    Thank you for referring your patient, Ifrah Huitron, to the [facility]. Please see a copy of my visit note below.    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 Pembro started summer 2021.  8/2021 scan shows response to pembro.  10/2021 progression-->doxil+ifosfamide complicated by mucositis, nausea, thrush, need for dose reduction  12/2021 scans show stable disease; continue doxil/ifos, has needed dose reductions for tolerability  2/2022 scans with slight reduction in tumor size, continue dose reduced doxil/ifos  1/2023 he had several types of systemic therapy in 2022 and progressed/much toxicity, stopped in Nov. Discussing starting pazopanib but held off due to fatigue, fevers, poor qol.   2/2023 palliative RT to splenic mass  3/2023 progressing, starting pazopanib  4/5/23 pazopanib held due to weakness/dizziness/fatigue/cognitive slowing     Course complicated by dysphonia, working with SLP summer 2021.Vocal cord dysfunction, follows ENT.  Has surgical hypopituitarism.  Has CKD     Social - Lives with wife Abida; 5 adult kids, 19 grandkids. Ran a golf course.      Care Planning - ACP discussion 6/2021 palliative visit. Discussed hospice; he has a lot of personal experience with it. Discussed hospice care and timing of enrollment Jan, Mar 2023 visits.      History:  History gathered today from: patient, family/loved ones, medical chart  Wife with him    Now off chemo 2/2 side effects/clinical decline    Pain has worsened:  Oxycodone 5 mg tabs: Was taking 7.5 mg bid  Now taking it 5x a day--this is a marked escalation just the last couple days.  Pain lower L/flank   Uses lidocaine topical  Continues to  get decent relief from oxyIR--just not lasting anymore than 3-4h now.     Sleepy tired; rests most of the day; ECOG 3.  Olanzapine 5 mg bid    Worsening WALKER going up stairs; has 1-2 min recovery.  Otherwise feels steady on feet.  Wife begining to help him to get up eg out of couch to standing    PE: There were no vitals taken for this visit.   Wt Readings from Last 3 Encounters:   04/18/23 63.5 kg (140 lb)   04/14/23 65.8 kg (145 lb)   03/27/23 63 kg (139 lb)     alert tired NAD  Clear sensorium  Frail     Data reviewed:  I reviewed recent labs and imaging, my comments:  Alb 3.1  Cr 1.1  Hgb 9.3  Plt 130    QTc 421ms    MRI hubert 4/19 CHEY/nothing acute     database reviewed: y      Impression & Recommendations:  71 yo with spindle cell sarcoma; large LUQ/diagphragmatic/splenic mass    Worsening pain in context of stopping systemic therapy; likely progression  We discussed methadone 2.5 mg tid to start  Ok to continue oxyIR 7.5 mg q4h prn; d/w him I hope in a few days he'll need it less often but we'll wait & see    Cancer and clinical decline  He's off pazopanib about 3 weeks without obvious improvement  His ECOG is 3  He hopes to be able to continue anticancer tx and has follow-up with Dr Wolff in 3 weeks to discuss further  Reviewed with him my recommendation that he should start soon and will clinically benefit from hospice enrollment, if he can no longer do anticancer therapy; I'm not clear if he'll have options when he meets with Dr Wolff soon.     Follow-up 2 weeks  We'll call him Friday too to see how he's doing with methadone    Over 30 minutes spent on the date of the encounter doing chart review, history and exam, patient education & counseling, documentation and other activities as noted above.    Thank you for involving us in the patient's care.   Sal Handy MD / Palliative Medicine / Text me via [employer]          Again, thank you for allowing me to participate in the care of your patient.       Sincerely,    Sal Handy MD

## 2023-04-25 NOTE — TELEPHONE ENCOUNTER
4/25/23    Called patient to discuss BP and elevated NT-BNP.     He is having more pain. He is going to be starting methadone as Oxycodone alone is not helping as much.     His blood pressure is 167/99. He is taking Losartan 25mg BID. Instructed him to take Losartan 50mg daily starting tomorrow.    His is having more dyspnea on exertion and swelling is increased. Will work on getting echo ASAP. Cancel labs and IVF this week as IVF can make heart failure worse.     Continue to hold chemotherapy. Reviewed with patient the possible need for hospice in the near future but will continue to assess week by week.     Maynor Rios PA-C

## 2023-04-25 NOTE — PROGRESS NOTES
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 Pembro started summer 2021.  8/2021 scan shows response to pembro.  10/2021 progression-->doxil+ifosfamide complicated by mucositis, nausea, thrush, need for dose reduction  12/2021 scans show stable disease; continue doxil/ifos, has needed dose reductions for tolerability  2/2022 scans with slight reduction in tumor size, continue dose reduced doxil/ifos  1/2023 he had several types of systemic therapy in 2022 and progressed/much toxicity, stopped in Nov. Discussing starting pazopanib but held off due to fatigue, fevers, poor qol.   2/2023 palliative RT to splenic mass  3/2023 progressing, starting pazopanib  4/5/23 pazopanib held due to weakness/dizziness/fatigue/cognitive slowing     Course complicated by dysphonia, working with SLP summer 2021.Vocal cord dysfunction, follows ENT.  Has surgical hypopituitarism.  Has CKD     Social - Lives with wife Abida; 5 adult kids, 19 grandkids. 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      Care Planning - ACP discussion 6/2021 palliative visit. Discussed hospice; he has a lot of personal experience with it. Discussed hospice care and timing of enrollment Jan, Mar 2023 visits.      History:  History gathered today from: patient, family/loved ones, medical chart  Wife with him    Now off chemo 2/2 side effects/clinical decline    Pain has worsened:  Oxycodone 5 mg tabs: Was taking 7.5 mg bid  Now taking it 5x a day--this is a marked escalation just the last couple days.  Pain lower L/flank   Uses lidocaine topical  Continues to get decent relief from oxyIR--just not lasting anymore than 3-4h now.     Sleepy tired; rests most of the day; ECOG 3.  Olanzapine 5 mg bid    Worsening WALKER going up stairs; has 1-2 min recovery.  Otherwise feels steady on feet.  Wife begining to help him to get up eg out of couch to standing    PE:  There were no vitals taken for this visit.   Wt Readings from Last 3 Encounters:   04/18/23 63.5 kg (140 lb)   04/14/23 65.8 kg (145 lb)   03/27/23 63 kg (139 lb)     alert tired NAD  Clear sensorium  Frail     Data reviewed:  I reviewed recent labs and imaging, my comments:  Alb 3.1  Cr 1.1  Hgb 9.3  Plt 130    QTc 421ms    MRI hubert 4/19 CHEY/nothing acute     database reviewed: y      Impression & Recommendations:  73 yo with spindle cell sarcoma; large LUQ/diagphragmatic/splenic mass    Worsening pain in context of stopping systemic therapy; likely progression  We discussed methadone 2.5 mg tid to start  Ok to continue oxyIR 7.5 mg q4h prn; d/w him I hope in a few days he'll need it less often but we'll wait & see    Cancer and clinical decline  He's off pazopanib about 3 weeks without obvious improvement  His ECOG is 3  He hopes to be able to continue anticancer tx and has follow-up with Dr Wolff in 3 weeks to discuss further  Reviewed with him my recommendation that he should start soon and will clinically benefit from hospice enrollment, if he can no longer do anticancer therapy; I'm not clear if he'll have options when he meets with Dr Wolff soon.     Follow-up 2 weeks  We'll call him Friday too to see how he's doing with methadone    Over 30 minutes spent on the date of the encounter doing chart review, history and exam, patient education & counseling, documentation and other activities as noted above.    Thank you for involving us in the patient's care.   Sal Handy MD / Palliative Medicine / Text me via [employer]      Video-Visit Details  Video Start Time: 9:59 AM  Video End Time:10:16 AM  Originating Location (pt. Location): Home  Distant Location (provider location):  Off-site  Platform used for Video Visit: Thomas

## 2023-04-25 NOTE — NURSING NOTE
Is the patient currently in the state of [place]? YES    Visit mode:VIDEO    If the visit is dropped, the patient can be reconnected by: VIDEO VISIT: Text to cell phone: [phone]    Will anyone else be joining the visit? YES: How would they like to receive their invitation?       How would you like to obtain your AVS? MyChart    Are changes needed to the allergy or medication list? NO    Patient denies any changes since echeck-in regarding medication and allergies and states all information entered during echeck-in remains accurate.    Reason for visit: Video Visit    Austin RODRIGUEZ

## 2023-05-01 NOTE — TELEPHONE ENCOUNTER
Narcotic Refill Request    Medication(s) requested:  Oxycodone   Person Requesting Refill: Ifrah   What pain is the medication treating: lower back above belt line to left of spine   How is the medication being taken?:Oxycodone every 4 hours   Does pt have enough for today? Yes has enough for today  5 pills left   Is pain being adequately controlled on the current regimen?: yes for the most part   Experiencing any side effects from medication?: constipation and nausea and dizziness.   Uses stool softner  For constipation   Nausea uses medication zofran  to control   Dizziness, changes position slowly       Date of most recent appointment:  4/21/23 Maynor CHOW  Any No Show Visits:None   Next appointment:   5/3/23 Maynor CHOW  Last fill date and by whom:  4/3/23 Maynor CHOW   Reviewed: No access     Routed provider:  Dr Wolff as Maynor CHOW   Is out of office.     Has been out of zyprexa and can not be refilled per Jeff Richter until later on in May. They state they last picked up Zyprexa on 4/14/23 and is already out of medication. He is wondering if there is anything the care team can do to help get a bridge of medication until they can  his next refill.     Also states he feels like he is going to drop things all the time. Has less muscle strength in hands and arms. Both sides feel equal in diminished strength. Can hold a glass to drink out of but states he always feels like he is going to drop it and has occasionally dropped it. Can brush his teeth but feels like he is having a hard time holding onto the toothbrush. Occasionally has a jerk reaction in his legs where his knees are popping out on him.  He is able to walk around the house.     Has appt with Maynor CHOW  on 5/3/23 and appt with Dr Wolff on 5/16/23.     8:54 Paged DR Wolff - last visit with Dr Wolff on 3/30/23.     Noted message to Dr Handy that [place] would like to start hospice care.  Call placed to [facility] and asked if he wanted to start hospice care he states he does want to start hospice care as soon as possible.     Will route encounter to care team

## 2023-05-01 NOTE — TELEPHONE ENCOUNTER
"[facility]o-pharmacist called about clarifying Dx code.   The Dx on the most recent Dr. Wolff Oxycodone order was for \"Acute post surgical pain\" so they would not be able to refill Oxycodone 100 tablets,     This writer updated pt also has Dx of Soft Tissue Sarcoma.    Pharmacy checked  and pt is able to get Oxycodone 100tablets for cancer pain.    This writer routed update to Palliative and oncology care team.     "

## 2023-05-02 NOTE — PROGRESS NOTES
Virtual Visit Details    Type of service:  Video Visit   Video Start Time:2:30pm  Video End Time:3:00pm    Originating Location (pt. 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 History as follows:  - March 2021 presented with chest pain and back pain, imaging with lung mets and biopsy of mediastinal mass consistent with spindle cell sarcoma with high PD-L1. Baseline imaging lung mets, left sided subdiaphragmatic mass, liver lesions.   - June 2021-October 2021 Pembrolizumab, tolerated well, stopped due to disease progression  - October 2021-February 2022 Doxil + Ifos, not tolerated well, required multiple treatment delays, IVF support, dose reductions, horrible mucositis. Stopped due to tolerance  - March 2022-August 2022 Doxil alone, not tolerated well with ongoing skin and mouth toxicity, requiring delays and dose reductions. Stopped due to disease progression  - August 2022-November 2022 Gemzar alone, not tolerated well with cytopenias and edema, stopped due to disease progression  - November 2022 Trabectedin, only had one cycle. Horrible toxicity with HUSSEIN, hepatic toxicity, cytopenias, required hospital admission. Stopped due to poor tolerance and disease progression  -December 2022 Admission for acute cholecystitis s/p lap carmelita 12/24/22, repeat admission after discharge for sepsis and pain     Plan to start Pazopanib as next line therapy for sarcoma, however haven't been able to start due to ongoing fatigue, fevers (ED visit 1/10/23 discharged on Levaquin), and poorly controlled pain.     CT 1/25/23 with slight progression of splenic mass, otherwise stable disease. As such opted to do radiation to mass and hold off on chemotherapy for  the time.      He has continued off treatment. CT 3/17/23 with disease progression. Started Pazopanib 3/28/23. Held 4/5/23 for toxicity (fatigue, dizziness, anorexia, weakness).     We have not been able to do any further chemotherapy with ongoing clinical decline. Now in discussion for hospice.     Interval History:  Ifrah has not been doing well. He continues to feel weaker. He notes worsening confusion and brain fog. More recently has been having episodes where he is acutely confused and weak such as last night when he wasn't even able to stand or dress himself. Right now he is more clear but it is hard to predict when he will have these episodes of altered mental status. He has been dropping things more- will be holding something and then all of a sudden jerk and drop it. His legs are weak and while he is able to walk he has to shuffle. The last two days he has noted urinary incontinence with no warning. Having to wear depends. Continues to have significant edema. Gets winded easily.    Current Outpatient Medications   Medication Sig Dispense Refill     acetaminophen (TYLENOL) 325 MG tablet Take 2 tablets (650 mg) by mouth every 6 hours as needed for mild pain or other (and adjunct with moderate or severe pain or per patient request)       aspirin-acetaminophen-caffeine (EXCEDRIN MIGRAINE) 250-250-65 MG tablet Take 1 tablet by mouth daily as needed for headaches       doxepin (SINEQUAN) 10 MG/ML (HIGH CONC) solution Take 2.5 mLs (25 mg) by mouth 2 times daily as needed (rinse for mucositis) Dilute the 2.5 mL of doxepin to 5 ml total in sterile or distilled water. 118 mL 0     escitalopram (LEXAPRO) 10 MG tablet Take 1 tablet (10 mg) by mouth daily 30 tablet 0     guaiFENesin-codeine (GUAIFENESIN AC) 100-10 MG/5ML syrup Take 10 mLs by mouth every 4 hours as needed for cough 118 mL 0     hydrocortisone (CORTEF) 10 MG tablet Take 20 mg in the morning and 10 mg in the afternoon 270 tablet 2     Insulin Syringe-Needle  "U-100 27.5G X 5/8\" 2 ML MISC 1 each daily as needed (with solucortef for adrenal crisis) 10 each 1     levothyroxine (SYNTHROID/LEVOTHROID) 75 MCG tablet Take one tab p.o. 6 days per week and 0.5 tabs one day per week ( total of 6.5 tablets weekly) 90 tablet 2     lidocaine (XYLOCAINE) 5 % external ointment Apply topically 4 times daily as needed for moderate pain (4-6) 240 g 1     losartan (COZAAR) 25 MG tablet Take 2 tablets (50 mg) by mouth daily 60 tablet 3     methadone (DOLOPHINE) 5 MG tablet Take 0.5 tablets (2.5 mg) by mouth 3 times daily 45 tablet 0     naloxone (NARCAN) 4 MG/0.1ML nasal spray Spray 1 spray (4 mg) into one nostril alternating nostrils as needed for opioid reversal every 2-3 minutes until assistance arrives 0.2 mL 0     OLANZapine (ZYPREXA) 5 MG tablet Take 1 tablet (5 mg) by mouth 2 times daily as needed 60 tablet 1     ondansetron (ZOFRAN) 4 MG tablet Take 1-2 tablets (4-8 mg) by mouth every 8 hours as needed for nausea 60 tablet 3     oxyCODONE (ROXICODONE) 5 MG tablet Take 1-2 tablets (5-10 mg) by mouth every 4 hours as needed for moderate to severe pain 100 tablet 0     pazopanib (VOTRIENT) 200 MG tablet Take 4 tablets (800 mg) by mouth daily (Patient not taking: Reported on 4/21/2023)  0     phosphorus tablet 250 mg (PHOSPHA 250 NEUTRAL) 250 MG per tablet Take 1 tablet (250 mg) by mouth daily 90 tablet 3     potassium chloride ER (KLOR-CON M) 20 MEQ CR tablet Take 1 tablet (20 mEq) by mouth daily 90 tablet 3     SUMAtriptan (IMITREX) 50 MG tablet Take 1 tablet (50 mg) by mouth at onset of headache for migraine May repeat in 2 hours. Max 4 tablets/24 hours. 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     Thyroid disease     removed pituitary gland     Past Surgical History:   Procedure Laterality Date     " COLONOSCOPY N/A 12/17/2020    Procedure: COLONOSCOPY;  Surgeon: Ken Camacho MD;  Location: [facility]     ENDOSCOPIC RETROGRADE CHOLANGIOPANCREATOGRAM N/A 12/23/2022    Procedure: ENDOSCOPIC RETROGRADE CHOLANGIOPANCREATOGRAPHY;  Surgeon: Jim Lamar MD;  [place] [facility] [place] SURGERY       ESOPHAGOSCOPY, GASTROSCOPY, DUODENOSCOPY (EGD), COMBINED N/A 12/27/2022    Procedure: ESOPHAGOGASTRODUODENOSCOPY (EGD);  Surgeon: Brenton Francois MD;  Location: [facility]     HERNIA REPAIR       INSERT PORT VASCULAR ACCESS Right 1/28/2022    Procedure: INSERTION, VASCULAR ACCESS PORT;  Surgeon: Daniel Kinney MD;  Location: [facility]     IR CHEST PORT PLACEMENT > 5 YRS OF AGE  1/28/2022     LAPAROSCOPIC CHOLECYSTECTOMY N/A 12/24/2022    Procedure: CHOLECYSTECTOMY, LAPAROSCOPIC;  Surgeon: Froy More DO;  [place] [facility] [place]     LARYNGOSCOPY, EXCISE VOCAL CORD LESION MICROSCOPIC, COMBINED Left 07/01/2021    Procedure: MICROLARYNGOSCOPY, LEFT TRUE VOCAL CORD INJECTION WITH PROLARYN;  Surgeon: Kyree Bearden MD;  Location: [facility]     PHACOEMULSIFICATION WITH STANDARD INTRAOCULAR LENS IMPLANT Right 03/10/2021    Procedure: Cataract removal with implant.;  Surgeon: Jamir Mac MD;  Location: [facility]     PHACOEMULSIFICATION WITH STANDARD INTRAOCULAR LENS IMPLANT Left 04/05/2021    Procedure: Cataract removal with implant.;  Surgeon: Jamir Mac MD;  Location: [facility]     PICC DOUBLE LUMEN PLACEMENT Right 12/01/2021    5FR DL PICC, basilic vein. L-38cm, 1cm out.     PICC DOUBLE LUMEN PLACEMENT Right 01/04/2022    Right cephalic, 41 cm, 1 external length     PITUITARY EXCISION       tooth pulled 4/7  Right      Allergies   Allergen Reactions     Penicillins Hives and Swelling     Occurred as small child   Pt tolerated meropenem 12/23/22       Social History   Social History     Tobacco Use     Smoking status: Never     Smokeless tobacco: Never   Substance Use Topics      Alcohol use: Yes     Comment: rare     Drug use: Never      Past medical history and social history were reviewed.    Physical Examination:  There were no vitals taken for this visit.  Wt Readings from Last 10 Encounters:   04/18/23 63.5 kg (140 lb)   04/14/23 65.8 kg (145 lb)   03/27/23 63 kg (139 lb)   03/24/23 65.8 kg (145 lb)   03/01/23 66.5 kg (146 lb 9.6 oz)   02/15/23 66.5 kg (146 lb 9.6 oz)   02/06/23 63.3 kg (139 lb 9.6 oz)   01/26/23 59.3 kg (130 lb 12.8 oz)   01/19/23 59.5 kg (131 lb 3.2 oz)   01/10/23 63.5 kg (140 lb)     Video physical exam  General: Patient appears chronically ill in no acute distress.   Skin: No visualized rash or lesions on visualized skin  Eyes: EOMI, no erythema, sclera icterus or discharge noted  Resp: Appears to be breathing comfortably without accessory muscle usage, speaking in full sentences, no cough  MSK: Appears to have normal range of motion based on visualized movements  Neurologic: No apparent tremors, facial movements asymmetric but stable. Difficult with word finding  Psych: affect normal, alert and oriented    Laboratory Data:  No new labs to review       Assessment and Plan:  #  Metastatic spindle cell sarcoma  S/p progression on multiple lines of therapy.  Has not tolerated prior chemotherapy well. Had Trabectedin November 2022 that had severe toxicities resulting in multiple hospitalizations. Most recently tried Pazopanib x 1 week and had to stop for side effects.      His performance status continues to decline, now ECOG 3. He is not a candidate for any further chemotherapy. Reviewed in depth with Dr. Wolff.     Will be meeting with hospice tomorrow. Reviewed that when he enrolls in hospice we would cancel all future appointments and focus on symptom control.    He does have symptoms concerning for cord compression (see discussion below) so would like to work this up first prior to enrollment in hospice. That being said he would like to avoid going to the  hospital which is reasonable given goals of comfort.     # Lower extremity weakness  # Urinary incontinence   Lower extremity weakness is progressive over the last month or so. Urinary incontinence new in the last 2 days. Has intermittent numbness. He denies any midline spinal tenderness. There is certainly a possibility of cancer progression to spine given minimal chemotherapy given in the last 6 months.    Reviewed options of ED now vs outpatient MRI thoracic and lumbar spine and he prefers outpatient. Next available in [place] in 5/10/23. Pending results would discuss radiation. Would not put him through surgery with plans to got to hospice.    In the meantime will start Dex 8mg daily with breakfast and closely monitor symptoms. Discussed with patient the chance of paralysis with worsening compression. He will monitor the strength in his legs and go to ED if symptoms worsen before scheduled MRI.    # Episodes of AMS  # Dropping things  Unclear etiology, worse in the last few weeks with only new change being addition of methadone. Of note had brain MRI mid April that was negative for bone mets.     Given plan to soon enroll in hospice and patient goals of avoiding hospitalization will not do additional brain imaging at this time.    Reviewed with Dr. Handy (palliative) and OK to stop methadone in case this was contributing.     Closely monitor symptoms.    # Edema  # WALKER  # Concern for chemotherapy cardiac toxicity  NT-BNP elevated concerning for HF. Plan for echo 5/8/23, though pending ongoing goals of care discussion could cancel.    Edema is manageable at the moment so hold off on adding diuretic as this could worsening dizziness and urinary incontinence.     # Poor oral intake  # Dysgeusia  # Hypophos  # Hypokalemia  Ongoing issue. Avoiding IVF with CHF concerns above. Hospice discussions as above.     Will restart Zyprexa to help with appetite (and mood). With recent CNS concerns though start with once  daily at bedtime.     Continue potassium and phos supplement for now, would stop when he enrolls in hospice.      # Cancer related pain  Continue Oxycodone 5-10mg every 4 hours PRN. Continue topical lidocaine. Avoid NSAIDs. Stopping methadone as above.    Dex may help with pain as well.      # Hypopituitarism  # Hypothyroidism  Continues Hydrocortisone and Synthroid.     # Nausea, dyspepsia  Continue Zofran and Compazine. Now also on Zyprexa. Tums PRN.      #  Depression/anxiety  Continue Lexapro. Adding back Zyprexa. Mood has been worse, hoping Zyprexa addition will help. Emotional support with end of life discussions.     # HTN  Continue losartan. Not discussed today.     120 minutes spent on the date of the encounter doing chart review, review of test results, interpretation of tests, patient visit, documentation and discussion with other provider(s)     Maynor Rios PA-C  Department of Hematology and Oncology  [facility]

## 2023-05-03 NOTE — NURSING NOTE
Is the patient currently in the state of [place]? YES    Visit mode:VIDEO    If the visit is dropped, the patient can be reconnected by: VIDEO VISIT: Send to e-mail at: [email or web address]    Will anyone else be joining the visit? NO      How would you like to obtain your AVS? MyChart    Are changes needed to the allergy or medication list? NO    Reason for visit: Video Visit      JENNIFER Chacon

## 2023-05-03 NOTE — LETTER
5/3/2023         RE: Ifrah Huitron  [id number] [address] [facility] [address]        Dear Colleague,    Thank you for referring your patient, Ifrah Huitron, to the [facility]. Please see a copy of my visit note below.    Virtual Visit Details    Type of service:  Video Visit   Video Start Time:2:30pm  Video End Time:3:00pm    Originating Location (pt. 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 History as follows:  - March 2021 presented with chest pain and back pain, imaging with lung mets and biopsy of mediastinal mass consistent with spindle cell sarcoma with high PD-L1. Baseline imaging lung mets, left sided subdiaphragmatic mass, liver lesions.   - June 2021-October 2021 Pembrolizumab, tolerated well, stopped due to disease progression  - October 2021-February 2022 Doxil + Ifos, not tolerated well, required multiple treatment delays, IVF support, dose reductions, horrible mucositis. Stopped due to tolerance  - March 2022-August 2022 Doxil alone, not tolerated well with ongoing skin and mouth toxicity, requiring delays and dose reductions. Stopped due to disease progression  - August 2022-November 2022 Gemzar alone, not tolerated well with cytopenias and edema, stopped due to disease progression  - November 2022 Trabectedin, only had one cycle. Horrible toxicity with HUSSEIN, hepatic toxicity, cytopenias, required hospital admission. Stopped due to poor tolerance and disease progression  -December 2022 Admission for acute cholecystitis s/p lap carmelita 12/24/22, repeat admission after discharge for sepsis and pain     Plan to start Pazopanib as next line therapy for sarcoma, however haven't been able to start due to  ongoing fatigue, fevers (ED visit 1/10/23 discharged on Levaquin), and poorly controlled pain.     CT 1/25/23 with slight progression of splenic mass, otherwise stable disease. As such opted to do radiation to mass and hold off on chemotherapy for the time.      He has continued off treatment. CT 3/17/23 with disease progression. Started Pazopanib 3/28/23. Held 4/5/23 for toxicity (fatigue, dizziness, anorexia, weakness).     We have not been able to do any further chemotherapy with ongoing clinical decline. Now in discussion for hospice.     Interval History:  Ifrah has not been doing well. He continues to feel weaker. He notes worsening confusion and brain fog. More recently has been having episodes where he is acutely confused and weak such as last night when he wasn't even able to stand or dress himself. Right now he is more clear but it is hard to predict when he will have these episodes of altered mental status. He has been dropping things more- will be holding something and then all of a sudden jerk and drop it. His legs are weak and while he is able to walk he has to shuffle. The last two days he has noted urinary incontinence with no warning. Having to wear depends. Continues to have significant edema. Gets winded easily.    Current Outpatient Medications   Medication Sig Dispense Refill     acetaminophen (TYLENOL) 325 MG tablet Take 2 tablets (650 mg) by mouth every 6 hours as needed for mild pain or other (and adjunct with moderate or severe pain or per patient request)       aspirin-acetaminophen-caffeine (EXCEDRIN MIGRAINE) 250-250-65 MG tablet Take 1 tablet by mouth daily as needed for headaches       doxepin (SINEQUAN) 10 MG/ML (HIGH CONC) solution Take 2.5 mLs (25 mg) by mouth 2 times daily as needed (rinse for mucositis) Dilute the 2.5 mL of doxepin to 5 ml total in sterile or distilled water. 118 mL 0     escitalopram (LEXAPRO) 10 MG tablet Take 1 tablet (10 mg) by mouth daily 30 tablet 0      "guaiFENesin-codeine (GUAIFENESIN AC) 100-10 MG/5ML syrup Take 10 mLs by mouth every 4 hours as needed for cough 118 mL 0     hydrocortisone (CORTEF) 10 MG tablet Take 20 mg in the morning and 10 mg in the afternoon 270 tablet 2     Insulin Syringe-Needle U-100 27.5G X 5/8\" 2 ML MISC 1 each daily as needed (with solucortef for adrenal crisis) 10 each 1     levothyroxine (SYNTHROID/LEVOTHROID) 75 MCG tablet Take one tab p.o. 6 days per week and 0.5 tabs one day per week ( total of 6.5 tablets weekly) 90 tablet 2     lidocaine (XYLOCAINE) 5 % external ointment Apply topically 4 times daily as needed for moderate pain (4-6) 240 g 1     losartan (COZAAR) 25 MG tablet Take 2 tablets (50 mg) by mouth daily 60 tablet 3     methadone (DOLOPHINE) 5 MG tablet Take 0.5 tablets (2.5 mg) by mouth 3 times daily 45 tablet 0     naloxone (NARCAN) 4 MG/0.1ML nasal spray Spray 1 spray (4 mg) into one nostril alternating nostrils as needed for opioid reversal every 2-3 minutes until assistance arrives 0.2 mL 0     OLANZapine (ZYPREXA) 5 MG tablet Take 1 tablet (5 mg) by mouth 2 times daily as needed 60 tablet 1     ondansetron (ZOFRAN) 4 MG tablet Take 1-2 tablets (4-8 mg) by mouth every 8 hours as needed for nausea 60 tablet 3     oxyCODONE (ROXICODONE) 5 MG tablet Take 1-2 tablets (5-10 mg) by mouth every 4 hours as needed for moderate to severe pain 100 tablet 0     pazopanib (VOTRIENT) 200 MG tablet Take 4 tablets (800 mg) by mouth daily (Patient not taking: Reported on 4/21/2023)  0     phosphorus tablet 250 mg (PHOSPHA 250 NEUTRAL) 250 MG per tablet Take 1 tablet (250 mg) by mouth daily 90 tablet 3     potassium chloride ER (KLOR-CON M) 20 MEQ CR tablet Take 1 tablet (20 mEq) by mouth daily 90 tablet 3     SUMAtriptan (IMITREX) 50 MG tablet Take 1 tablet (50 mg) by mouth at onset of headache for migraine May repeat in 2 hours. Max 4 tablets/24 hours. 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     Thyroid disease     removed pituitary gland     Past Surgical History:   Procedure Laterality Date     COLONOSCOPY N/A 12/17/2020    Procedure: COLONOSCOPY;  Surgeon: Ken Camacho MD;  Location: [facility]     ENDOSCOPIC RETROGRADE CHOLANGIOPANCREATOGRAM N/A 12/23/2022    Procedure: ENDOSCOPIC RETROGRADE CHOLANGIOPANCREATOGRAPHY;  Surgeon: Jim Lamar MD;  [place] [facility] [place] [place]     ENT SURGERY       ESOPHAGOSCOPY, GASTROSCOPY, DUODENOSCOPY (EGD), COMBINED N/A 12/27/2022    Procedure: ESOPHAGOGASTRODUODENOSCOPY (EGD);  Surgeon: Brenton Francois MD;  Location: [facility]     HERNIA REPAIR       INSERT PORT VASCULAR ACCESS Right 1/28/2022    Procedure: INSERTION, VASCULAR ACCESS PORT;  Surgeon: Daniel Kinney MD;  Location: [facility]     IR CHEST PORT PLACEMENT > 5 YRS OF AGE  1/28/2022     LAPAROSCOPIC CHOLECYSTECTOMY N/A 12/24/2022    Procedure: CHOLECYSTECTOMY, LAPAROSCOPIC;  Surgeon: Froy More DO;  [place] [facility] [place]     LARYNGOSCOPY, EXCISE VOCAL CORD LESION MICROSCOPIC, COMBINED Left 07/01/2021    Procedure: MICROLARYNGOSCOPY, LEFT TRUE VOCAL CORD INJECTION WITH PROLARYN;  Surgeon: Kyree Bearden MD;  Location: [facility]     PHACOEMULSIFICATION WITH STANDARD INTRAOCULAR LENS IMPLANT Right 03/10/2021    Procedure: Cataract removal with implant.;  Surgeon: Jamir Mac MD;  Location: [facility]     PHACOEMULSIFICATION WITH STANDARD INTRAOCULAR LENS IMPLANT Left 04/05/2021    Procedure: Cataract removal with implant.;  Surgeon: Jamir Mac MD;  Location: [facility]     PICC DOUBLE LUMEN PLACEMENT Right 12/01/2021    5FR DL PICC, basilic vein. L-38cm, 1cm out.     PICC DOUBLE LUMEN PLACEMENT Right 01/04/2022    Right cephalic, 41 cm, 1 external length     PITUITARY EXCISION       tooth pulled 4/7  Right      Allergies    Allergen Reactions     Penicillins Hives and Swelling     Occurred as small child   Pt tolerated meropenem 12/23/22       Social History   Social History     Tobacco Use     Smoking status: Never     Smokeless tobacco: Never   Substance Use Topics     Alcohol use: Yes     Comment: rare     Drug use: Never      Past medical history and social history were reviewed.    Physical Examination:  There were no vitals taken for this visit.  Wt Readings from Last 10 Encounters:   04/18/23 63.5 kg (140 lb)   04/14/23 65.8 kg (145 lb)   03/27/23 63 kg (139 lb)   03/24/23 65.8 kg (145 lb)   03/01/23 66.5 kg (146 lb 9.6 oz)   02/15/23 66.5 kg (146 lb 9.6 oz)   02/06/23 63.3 kg (139 lb 9.6 oz)   01/26/23 59.3 kg (130 lb 12.8 oz)   01/19/23 59.5 kg (131 lb 3.2 oz)   01/10/23 63.5 kg (140 lb)     Video physical exam  General: Patient appears chronically ill in no acute distress.   Skin: No visualized rash or lesions on visualized skin  Eyes: EOMI, no erythema, sclera icterus or discharge noted  Resp: Appears to be breathing comfortably without accessory muscle usage, speaking in full sentences, no cough  MSK: Appears to have normal range of motion based on visualized movements  Neurologic: No apparent tremors, facial movements asymmetric but stable. Difficult with word finding  Psych: affect normal, alert and oriented    Laboratory Data:  No new labs to review       Assessment and Plan:  #  Metastatic spindle cell sarcoma  S/p progression on multiple lines of therapy.  Has not tolerated prior chemotherapy well. Had Trabectedin November 2022 that had severe toxicities resulting in multiple hospitalizations. Most recently tried Pazopanib x 1 week and had to stop for side effects.      His performance status continues to decline, now ECOG 3. He is not a candidate for any further chemotherapy. Reviewed in depth with Dr. Wolff.     Will be meeting with hospice tomorrow. Reviewed that when he enrolls in hospice we would cancel all  future appointments and focus on symptom control.    He does have symptoms concerning for cord compression (see discussion below) so would like to work this up first prior to enrollment in hospice. That being said he would like to avoid going to the hospital which is reasonable given goals of comfort.     # Lower extremity weakness  # Urinary incontinence   Lower extremity weakness is progressive over the last month or so. Urinary incontinence new in the last 2 days. Has intermittent numbness. He denies any midline spinal tenderness. There is certainly a possibility of cancer progression to spine given minimal chemotherapy given in the last 6 months.    Reviewed options of ED now vs outpatient MRI thoracic and lumbar spine and he prefers outpatient. Next available in [place] in 5/10/23. Pending results would discuss radiation. Would not put him through surgery with plans to got to hospice.    In the meantime will start Dex 8mg daily with breakfast and closely monitor symptoms. Discussed with patient the chance of paralysis with worsening compression. He will monitor the strength in his legs and go to ED if symptoms worsen before scheduled MRI.    # Episodes of AMS  # Dropping things  Unclear etiology, worse in the last few weeks with only new change being addition of methadone. Of note had brain MRI mid April that was negative for bone mets.     Given plan to soon enroll in hospice and patient goals of avoiding hospitalization will not do additional brain imaging at this time.    Reviewed with Dr. Handy (palliative) and OK to stop methadone in case this was contributing.     Closely monitor symptoms.    # Edema  # WALKER  # Concern for chemotherapy cardiac toxicity  NT-BNP elevated concerning for HF. Plan for echo 5/8/23, though pending ongoing goals of care discussion could cancel.    Edema is manageable at the moment so hold off on adding diuretic as this could worsening dizziness and urinary incontinence.     #  Poor oral intake  # Dysgeusia  # Hypophos  # Hypokalemia  Ongoing issue. Avoiding IVF with CHF concerns above. Hospice discussions as above.     Will restart Zyprexa to help with appetite (and mood). With recent CNS concerns though start with once daily at bedtime.     Continue potassium and phos supplement for now, would stop when he enrolls in hospice.      # Cancer related pain  Continue Oxycodone 5-10mg every 4 hours PRN. Continue topical lidocaine. Avoid NSAIDs. Stopping methadone as above.    Dex may help with pain as well.      # Hypopituitarism  # Hypothyroidism  Continues Hydrocortisone and Synthroid.     # Nausea, dyspepsia  Continue Zofran and Compazine. Now also on Zyprexa. Tums PRN.      #  Depression/anxiety  Continue Lexapro. Adding back Zyprexa. Mood has been worse, hoping Zyprexa addition will help. Emotional support with end of life discussions.     # HTN  Continue losartan. Not discussed today.     120 minutes spent on the date of the encounter doing chart review, review of test results, interpretation of tests, patient visit, documentation and discussion with other provider(s)     Maynor Rios PA-C  Department of Hematology and Oncology  [facility]         Again, thank you for allowing me to participate in the care of your patient.        Sincerely,        GERALDO Mullins

## 2023-05-03 NOTE — LETTER
5/3/2023         RE: Ifrah [place]uitron  [address] [address] [facility] [id number]        Dear Colleague,    Thank you for referring your patient, Ifrah Huitron, to the [facility]. Please see a copy of my visit note below.    Virtual Visit Details    Type of service:  Video Visit   Video Start Time:2:30pm  Video End Time:3:00pm    Originating Location (pt. Location): Home    Distant Location (provider location):  On-site  Platform used for Video Visit: 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 History as follows:  - March 2021 presented with chest pain and back pain, imaging with lung mets and biopsy of mediastinal mass consistent with spindle cell sarcoma with high PD-L1. Baseline imaging lung mets, left sided subdiaphragmatic mass, liver lesions.   - June 2021-October 2021 Pembrolizumab, tolerated well, stopped due to disease progression  - October 2021-February 2022 Doxil + Ifos, not tolerated well, required multiple treatment delays, IVF support, dose reductions, horrible mucositis. Stopped due to tolerance  - March 2022-August 2022 Doxil alone, not tolerated well with ongoing skin and mouth toxicity, requiring delays and dose reductions. Stopped due to disease progression  - August 2022-November 2022 Gemzar alone, not tolerated well with cytopenias and edema, stopped due to disease progression  - November 2022 Trabectedin, only had one cycle. Horrible toxicity with HUSSEIN, hepatic toxicity, cytopenias, required hospital admission. Stopped due to poor tolerance and disease progression  -December 2022 Admission for acute cholecystitis s/p lap carmelita 12/24/22, repeat admission after discharge for sepsis and pain     Plan to start Pazopanib as next line therapy for sarcoma, however haven't been able to start due to  ongoing fatigue, fevers (ED visit 1/10/23 discharged on Levaquin), and poorly controlled pain.     CT 1/25/23 with slight progression of splenic mass, otherwise stable disease. As such opted to do radiation to mass and hold off on chemotherapy for the time.      He has continued off treatment. CT 3/17/23 with disease progression. Started Pazopanib 3/28/23. Held 4/5/23 for toxicity (fatigue, dizziness, anorexia, weakness).     We have not been able to do any further chemotherapy with ongoing clinical decline. Now in discussion for hospice.     Interval History:  Ifrah has not been doing well. He continues to feel weaker. He notes worsening confusion and brain fog. More recently has been having episodes where he is acutely confused and weak such as last night when he wasn't even able to stand or dress himself. Right now he is more clear but it is hard to predict when he will have these episodes of altered mental status. He has been dropping things more- will be holding something and then all of a sudden jerk and drop it. His legs are weak and while he is able to walk he has to shuffle. The last two days he has noted urinary incontinence with no warning. Having to wear depends. Continues to have significant edema. Gets winded easily.    Current Outpatient Medications   Medication Sig Dispense Refill     acetaminophen (TYLENOL) 325 MG tablet Take 2 tablets (650 mg) by mouth every 6 hours as needed for mild pain or other (and adjunct with moderate or severe pain or per patient request)       aspirin-acetaminophen-caffeine (EXCEDRIN MIGRAINE) 250-250-65 MG tablet Take 1 tablet by mouth daily as needed for headaches       doxepin (SINEQUAN) 10 MG/ML (HIGH CONC) solution Take 2.5 mLs (25 mg) by mouth 2 times daily as needed (rinse for mucositis) Dilute the 2.5 mL of doxepin to 5 ml total in sterile or distilled water. 118 mL 0     escitalopram (LEXAPRO) 10 MG tablet Take 1 tablet (10 mg) by mouth daily 30 tablet 0      "guaiFENesin-codeine (GUAIFENESIN AC) 100-10 MG/5ML syrup Take 10 mLs by mouth every 4 hours as needed for cough 118 mL 0     hydrocortisone (CORTEF) 10 MG tablet Take 20 mg in the morning and 10 mg in the afternoon 270 tablet 2     Insulin Syringe-Needle U-100 27.5G X 5/8\" 2 ML MISC 1 each daily as needed (with solucortef for adrenal crisis) 10 each 1     levothyroxine (SYNTHROID/LEVOTHROID) 75 MCG tablet Take one tab p.o. 6 days per week and 0.5 tabs one day per week ( total of 6.5 tablets weekly) 90 tablet 2     lidocaine (XYLOCAINE) 5 % external ointment Apply topically 4 times daily as needed for moderate pain (4-6) 240 g 1     losartan (COZAAR) 25 MG tablet Take 2 tablets (50 mg) by mouth daily 60 tablet 3     methadone (DOLOPHINE) 5 MG tablet Take 0.5 tablets (2.5 mg) by mouth 3 times daily 45 tablet 0     naloxone (NARCAN) 4 MG/0.1ML nasal spray Spray 1 spray (4 mg) into one nostril alternating nostrils as needed for opioid reversal every 2-3 minutes until assistance arrives 0.2 mL 0     OLANZapine (ZYPREXA) 5 MG tablet Take 1 tablet (5 mg) by mouth 2 times daily as needed 60 tablet 1     ondansetron (ZOFRAN) 4 MG tablet Take 1-2 tablets (4-8 mg) by mouth every 8 hours as needed for nausea 60 tablet 3     oxyCODONE (ROXICODONE) 5 MG tablet Take 1-2 tablets (5-10 mg) by mouth every 4 hours as needed for moderate to severe pain 100 tablet 0     pazopanib (VOTRIENT) 200 MG tablet Take 4 tablets (800 mg) by mouth daily (Patient not taking: Reported on 4/21/2023)  0     phosphorus tablet 250 mg (PHOSPHA 250 NEUTRAL) 250 MG per tablet Take 1 tablet (250 mg) by mouth daily 90 tablet 3     potassium chloride ER (KLOR-CON M) 20 MEQ CR tablet Take 1 tablet (20 mEq) by mouth daily 90 tablet 3     SUMAtriptan (IMITREX) 50 MG tablet Take 1 tablet (50 mg) by mouth at onset of headache for migraine May repeat in 2 hours. Max 4 tablets/24 hours. 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     Thyroid disease     removed pituitary gland     Past Surgical History:   Procedure Laterality Date     COLONOSCOPY N/A 12/17/2020    Procedure: COLONOSCOPY;  Surgeon: Ken Camacho MD;  Location: [facility]     ENDOSCOPIC RETROGRADE CHOLANGIOPANCREATOGRAM N/A 12/23/2022    Procedure: ENDOSCOPIC RETROGRADE CHOLANGIOPANCREATOGRAPHY;  Surgeon: Jim Lamar MD;  Location: [facility]     ENT SURGERY       ESOPHAGOSCOPY, GASTROSCOPY, DUODENOSCOPY (EGD), COMBINED N/A 12/27/2022    Procedure: ESOPHAGOGASTRODUODENOSCOPY (EGD);  Surgeon: Brenton Francois MD;  Location: [facility]     HERNIA REPAIR       INSERT PORT VASCULAR ACCESS Right 1/28/2022    Procedure: INSERTION, VASCULAR ACCESS PORT;  Surgeon: Daniel Kinney MD;  Location[place] [facility]     IR CHEST PORT PLACEMENT > 5 YRS OF AGE  1/28/2022     LAPAROSCOPIC CHOLECYSTECTOMY N/A 12/24/2022    Procedure: CHOLECYSTECTOMY, LAPAROSCOPIC;  Surgeon: Froy More DO;  Location: [facility]     LARYNGOSCOPY, EXCISE VOCAL CORD LESION MICROSCOPIC, COMBINED Left 07/01/2021    Procedure: MICROLARYNGOSCOPY, LEFT TRUE VOCAL CORD INJECTION WITH PROLARYN;  Surgeon: Kyree Bearden MD;  Location: [facility]     PHACOEMULSIFICATION WITH STANDARD INTRAOCULAR LENS IMPLANT Right 03/10/2021    Procedure: Cataract removal with implant.;  Surgeon: Jamir Mac MD;  Location: [facility]     PHACOEMULSIFICATION WITH STANDARD INTRAOCULAR LENS IMPLANT Left 04/05/2021    Procedure: Cataract removal with implant.;  Surgeon: Jamir Mac MD;  Location: [facility]     PICC DOUBLE LUMEN PLACEMENT Right 12/01/2021    5FR DL PICC, basilic vein. L-38cm, 1cm out.     PICC DOUBLE LUMEN PLACEMENT Right 01/04/2022    Right cephalic, 41 cm, 1 external length     PITUITARY EXCISION       tooth pulled 4/7  Right      Allergies    Allergen Reactions     Penicillins Hives and Swelling     Occurred as small child   Pt tolerated meropenem 12/23/22       Social History   Social History     Tobacco Use     Smoking status: Never     Smokeless tobacco: Never   Substance Use Topics     Alcohol use: Yes     Comment: rare     Drug use: Never      Past medical history and social history were reviewed.    Physical Examination:  There were no vitals taken for this visit.  Wt Readings from Last 10 Encounters:   04/18/23 63.5 kg (140 lb)   04/14/23 65.8 kg (145 lb)   03/27/23 63 kg (139 lb)   03/24/23 65.8 kg (145 lb)   03/01/23 66.5 kg (146 lb 9.6 oz)   02/15/23 66.5 kg (146 lb 9.6 oz)   02/06/23 63.3 kg (139 lb 9.6 oz)   01/26/23 59.3 kg (130 lb 12.8 oz)   01/19/23 59.5 kg (131 lb 3.2 oz)   01/10/23 63.5 kg (140 lb)     Video physical exam  General: Patient appears chronically ill in no acute distress.   Skin: No visualized rash or lesions on visualized skin  Eyes: EOMI, no erythema, sclera icterus or discharge noted  Resp: Appears to be breathing comfortably without accessory muscle usage, speaking in full sentences, no cough  MSK: Appears to have normal range of motion based on visualized movements  Neurologic: No apparent tremors, facial movements asymmetric but stable. Difficult with word finding  Psych: affect normal, alert and oriented    Laboratory Data:  No new labs to review       Assessment and Plan:  #  Metastatic spindle cell sarcoma  S/p progression on multiple lines of therapy.  Has not tolerated prior chemotherapy well. Had Trabectedin November 2022 that had severe toxicities resulting in multiple hospitalizations. Most recently tried Pazopanib x 1 week and had to stop for side effects.      His performance status continues to decline, now ECOG 3. He is not a candidate for any further chemotherapy. Reviewed in depth with Dr. Wolff.     Will be meeting with hospice tomorrow. Reviewed that when he enrolls in hospice we would cancel all  future appointments and focus on symptom control.    He does have symptoms concerning for cord compression (see discussion below) so would like to work this up first prior to enrollment in hospice. That being said he would like to avoid going to the hospital which is reasonable given goals of comfort.     # Lower extremity weakness  # Urinary incontinence   Lower extremity weakness is progressive over the last month or so. Urinary incontinence new in the last 2 days. Has intermittent numbness. He denies any midline spinal tenderness. There is certainly a possibility of cancer progression to spine given minimal chemotherapy given in the last 6 months.    Reviewed options of ED now vs outpatient MRI thoracic and lumbar spine and he prefers outpatient. Next available in [place] in 5/10/23. Pending results would discuss radiation. Would not put him through surgery with plans to got to hospice.    In the meantime will start Dex 8mg daily with breakfast and closely monitor symptoms. Discussed with patient the chance of paralysis with worsening compression. He will monitor the strength in his legs and go to ED if symptoms worsen before scheduled MRI.    # Episodes of AMS  # Dropping things  Unclear etiology, worse in the last few weeks with only new change being addition of methadone. Of note had brain MRI mid April that was negative for bone mets.     Given plan to soon enroll in hospice and patient goals of avoiding hospitalization will not do additional brain imaging at this time.    Reviewed with Dr. Handy (palliative) and OK to stop methadone in case this was contributing.     Closely monitor symptoms.    # Edema  # WALKER  # Concern for chemotherapy cardiac toxicity  NT-BNP elevated concerning for HF. Plan for echo 5/8/23, though pending ongoing goals of care discussion could cancel.    Edema is manageable at the moment so hold off on adding diuretic as this could worsening dizziness and urinary incontinence.     #  Poor oral intake  # Dysgeusia  # Hypophos  # Hypokalemia  Ongoing issue. Avoiding IVF with CHF concerns above. Hospice discussions as above.     Will restart Zyprexa to help with appetite (and mood). With recent CNS concerns though start with once daily at bedtime.     Continue potassium and phos supplement for now, would stop when he enrolls in hospice.      # Cancer related pain  Continue Oxycodone 5-10mg every 4 hours PRN. Continue topical lidocaine. Avoid NSAIDs. Stopping methadone as above.    Dex may help with pain as well.      # Hypopituitarism  # Hypothyroidism  Continues Hydrocortisone and Synthroid.     # Nausea, dyspepsia  Continue Zofran and Compazine. Now also on Zyprexa. Tums PRN.      #  Depression/anxiety  Continue Lexapro. Adding back Zyprexa. Mood has been worse, hoping Zyprexa addition will help. Emotional support with end of life discussions.     # HTN  Continue losartan. Not discussed today.     120 minutes spent on the date of the encounter doing chart review, review of test results, interpretation of tests, patient visit, documentation and discussion with other provider(s)     Maynor Rios PA-C  Department of Hematology and Oncology  [facility]         Again, thank you for allowing me to participate in the care of your patient.        Sincerely,        GERALDO Mullins

## 2023-05-03 NOTE — TELEPHONE ENCOUNTER
[facility]: Palliative Care                                                                                Spoke with spouse today and attempted to answer questions regarding hospice care.  They do have a meeting in 2 days with the hospice team.      They would still like to have appointment with Maynor REDMAN on 5.3.2023    Johanny  Palliative Care Nurse Coordinator  [phone]

## 2023-05-05 NOTE — TELEPHONE ENCOUNTER
5/5/23    Called patient for ongoing close follow-up. No answer. Called wife.     He is feeling a lot better. He stopped the methadone. Pain is still manageable with Oxycodone PRN. He added in Dexamethasone yesterday morning. He notes improvement in energy and appetite. Walking in the yard. The leg weakness is much better. He is able to hold the urine better.     They still don't have Zyprexa per the pharmacy they can get it next week. Will start at bedtime when they are able to get it.     They met with hospice yesterday afternoon. It was a nice meeting and they got more information.     He is eating better so OK to hold potassium and phosphorus supplement.     BP is trending better (134/77). Continue Losartan. Swelling and breathing improved and we are not doing further chemo nor would he want to do additional cardiac testing, so will cancel echo.     Discussed restarting PPI if he has heartburn on Dex.    Continue Dex 8mg daily. Has MRI scheduled Wednesday and I will see him Friday 5/12 virtually to review results and discuss dex taper plan.    Maynor Rios PA-C

## 2023-05-09 NOTE — PROGRESS NOTES
Virtual Visit Details    Type of service:  Video Visit   Video Start Time: 8:30am  Video End Time: 8:50am    Originating Location (pt. 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 History as follows:  - March 2021 presented with chest pain and back pain, imaging with lung mets and biopsy of mediastinal mass consistent with spindle cell sarcoma with high PD-L1. Baseline imaging lung mets, left sided subdiaphragmatic mass, liver lesions.   - June 2021-October 2021 Pembrolizumab, tolerated well, stopped due to disease progression  - October 2021-February 2022 Doxil + Ifos, not tolerated well, required multiple treatment delays, IVF support, dose reductions, horrible mucositis. Stopped due to tolerance  - March 2022-August 2022 Doxil alone, not tolerated well with ongoing skin and mouth toxicity, requiring delays and dose reductions. Stopped due to disease progression  - August 2022-November 2022 Gemzar alone, not tolerated well with cytopenias and edema, stopped due to disease progression  - November 2022 Trabectedin, only had one cycle. Horrible toxicity with HUSSEIN, hepatic toxicity, cytopenias, required hospital admission. Stopped due to poor tolerance and disease progression  -December 2022 Admission for acute cholecystitis s/p lap carmelita 12/24/22, repeat admission after discharge for sepsis and pain     Plan to start Pazopanib as next line therapy for sarcoma, however haven't been able to start due to ongoing fatigue, fevers (ED visit 1/10/23 discharged on Levaquin), and poorly controlled pain.     CT 1/25/23 with slight progression of splenic mass, otherwise stable disease. As such opted to do radiation to mass and hold off on chemotherapy  "for the time.      He has continued off treatment. CT 3/17/23 with disease progression. Started Pazopanib 3/28/23. Held 4/5/23 for toxicity (fatigue, dizziness, anorexia, weakness).      We have not been able to do any further chemotherapy with ongoing clinical decline. Now in discussion for hospice.     Interval History:  Ifrah is not doing well. He had a couple good days with higher dose Dex but now is not doing well. He is very fatigued, spending a lot of his day sleeping. He is weak, poor appetite. His mental clarity and dropping things has improved with stopping methadone, though he still feels groggy. He is having more back pain and Oxycodone 15mg is only helping some. Urinary incontinence improved. He has a hospital bed which has helped so he doesn't have to do stairs. Hospice is coming out later today.     Current Outpatient Medications   Medication Sig Dispense Refill     acetaminophen (TYLENOL) 325 MG tablet Take 2 tablets (650 mg) by mouth every 6 hours as needed for mild pain or other (and adjunct with moderate or severe pain or per patient request)       aspirin-acetaminophen-caffeine (EXCEDRIN MIGRAINE) 250-250-65 MG tablet Take 1 tablet by mouth daily as needed for headaches       dexamethasone (DECADRON) 4 MG tablet Take 2 tablets (8 mg) by mouth daily (with breakfast) 60 tablet 0     doxepin (SINEQUAN) 10 MG/ML (HIGH CONC) solution Take 2.5 mLs (25 mg) by mouth 2 times daily as needed (rinse for mucositis) Dilute the 2.5 mL of doxepin to 5 ml total in sterile or distilled water. 118 mL 0     escitalopram (LEXAPRO) 10 MG tablet Take 1 tablet (10 mg) by mouth daily 30 tablet 0     guaiFENesin-codeine (GUAIFENESIN AC) 100-10 MG/5ML syrup Take 10 mLs by mouth every 4 hours as needed for cough 118 mL 0     hydrocortisone (CORTEF) 10 MG tablet Take 20 mg in the morning and 10 mg in the afternoon 270 tablet 2     Insulin Syringe-Needle U-100 27.5G X 5/8\" 2 ML MISC 1 each daily as needed (with solucortef for " adrenal crisis) 10 each 1     levothyroxine (SYNTHROID/LEVOTHROID) 75 MCG tablet Take one tab p.o. 6 days per week and 0.5 tabs one day per week ( total of 6.5 tablets weekly) 90 tablet 2     lidocaine (XYLOCAINE) 5 % external ointment Apply topically 4 times daily as needed for moderate pain (4-6) 240 g 1     LORazepam (ATIVAN) 0.5 MG tablet Take 1 tablet one hour before MRI. Take another tablet 30min before MRI if still anxious. Do not take with Oxycodone. 2 tablet 0     losartan (COZAAR) 25 MG tablet Take 2 tablets (50 mg) by mouth daily 60 tablet 3     naloxone (NARCAN) 4 MG/0.1ML nasal spray Spray 1 spray (4 mg) into one nostril alternating nostrils as needed for opioid reversal every 2-3 minutes until assistance arrives 0.2 mL 0     OLANZapine (ZYPREXA) 5 MG tablet Take 1 tablet (5 mg) by mouth At Bedtime 90 tablet 3     ondansetron (ZOFRAN) 4 MG tablet Take 1-2 tablets (4-8 mg) by mouth every 8 hours as needed for nausea 60 tablet 3     oxyCODONE (ROXICODONE) 5 MG tablet Take 1-2 tablets (5-10 mg) by mouth every 4 hours as needed for moderate to severe pain 100 tablet 0     phosphorus tablet 250 mg (PHOSPHA 250 NEUTRAL) 250 MG per tablet Take 1 tablet (250 mg) by mouth daily 90 tablet 3     potassium chloride ER (KLOR-CON M) 20 MEQ CR tablet Take 1 tablet (20 mEq) by mouth daily 90 tablet 3     SUMAtriptan (IMITREX) 50 MG tablet Take 1 tablet (50 mg) by mouth at onset of headache for migraine May repeat in 2 hours. Max 4 tablets/24 hours. 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     Thyroid disease     removed pituitary gland     Past Surgical History:   Procedure Laterality Date     COLONOSCOPY N/A 12/17/2020    Procedure: COLONOSCOPY;  Surgeon: Ken Camacho MD;  Location: [facility]     ENDOSCOPIC RETROGRADE CHOLANGIOPANCREATOGRAM N/A  12/23/2022    Procedure: ENDOSCOPIC RETROGRADE CHOLANGIOPANCREATOGRAPHY;  Surgeon: Jim Lamar MD;  Location[facility]     ENT SURGERY       ESOPHAGOSCOPY, GASTROSCOPY, DUODENOSCOPY (EGD), COMBINED N/A 12/27/2022    Procedure: ESOPHAGOGASTRODUODENOSCOPY (EGD);  Surgeon: Brenton Francois MD;  Location: [facility]     HERNIA REPAIR       INSERT PORT VASCULAR ACCESS Right 1/28/2022    Procedure: INSERTION, VASCULAR ACCESS PORT;  Surgeon: Daniel Kinney MD;  Location: [facility] OR     IR CHEST PORT PLACEMENT > 5 YRS OF AGE  1/28/2022     LAPAROSCOPIC CHOLECYSTECTOMY N/A 12/24/2022    Procedure: CHOLECYSTECTOMY, LAPAROSCOPIC;  Surgeon: Froy More DO;  Location: [facility] OR     LARYNGOSCOPY, EXCISE VOCAL CORD LESION MICROSCOPIC, COMBINED Left 07/01/2021    Procedure: MICROLARYNGOSCOPY, LEFT TRUE VOCAL CORD INJECTION WITH PROLARYN;  Surgeon: Kyree Bearden MD;  Location: [facility]     PHACOEMULSIFICATION WITH STANDARD INTRAOCULAR LENS IMPLANT Right 03/10/2021    Procedure: Cataract removal with implant.;  Surgeon: Jamir Mac MD;  Location: [facility]     PHACOEMULSIFICATION WITH STANDARD INTRAOCULAR LENS IMPLANT Left 04/05/2021    Procedure: Cataract removal with implant.;  Surgeon: Jamir Mac MD;  Location: [facility]     PICC DOUBLE LUMEN PLACEMENT Right 12/01/2021    5FR DL PICC, basilic vein. L-38cm, 1cm out.     PICC DOUBLE LUMEN PLACEMENT Right 01/04/2022    Right cephalic, 41 cm, 1 external length     PITUITARY EXCISION       tooth pulled 4/7  Right      Allergies   Allergen Reactions     Penicillins Hives and Swelling     Occurred as small child   Pt tolerated meropenem 12/23/22       Social History   Social History     Tobacco Use     Smoking status: Never     Smokeless tobacco: Never   Substance Use Topics     Alcohol use: Yes     Comment: rare     Drug use: Never      Past medical history and social history were reviewed.    Physical Examination:  There were no  vitals taken for this visit.  Wt Readings from Last 10 Encounters:   04/18/23 63.5 kg (140 lb)   04/14/23 65.8 kg (145 lb)   03/27/23 63 kg (139 lb)   03/24/23 65.8 kg (145 lb)   03/01/23 66.5 kg (146 lb 9.6 oz)   02/15/23 66.5 kg (146 lb 9.6 oz)   02/06/23 63.3 kg (139 lb 9.6 oz)   01/26/23 59.3 kg (130 lb 12.8 oz)   01/19/23 59.5 kg (131 lb 3.2 oz)   01/10/23 63.5 kg (140 lb)     Video physical exam  General: Patient appears fatigued, chronically ill in no acute distress.   Skin: No visualized rash or lesions on visualized skin  Eyes: EOMI, no erythema, sclera icterus or discharge noted  Resp: Appears to be breathing comfortably without accessory muscle usage, speaking in full sentences, no cough  MSK: Appears to have normal range of motion based on visualized movements  Neurologic: Asymmetry to face, stable from prior   Psych: affect flat, alert and oriented    Laboratory Data:  No labs to review    MRI Thoracic  IMPRESSION:  1.  Mild to moderate spinal canal narrowing at T10-T11.  2.  Mild spinal canal narrowings at T3-T4 and T4-T5.  3.  No high-grade neural foraminal narrowing.  4.  Ill-defined soft tissue mass involving the left kidney and involving the spleen. This is better characterized on the prior chest, abdomen and pelvis CT 03/17/2023.     MRI Lumbar  IMPRESSION:  1.  Mild degenerative changes of the lumbar spine.  2.  No high-grade spinal canal narrowing.  3.  Moderate right and mild left neuroforaminal narrowing at L3-L4.  4.  Mild to moderate bilateral neuroforaminal narrowing at L5-S1.  5.  Partially visualized heterogeneously enhancing mass involving the left kidney and spleen as well as heterogeneously enhancing soft tissue mass along the left retroperitoneal soft tissue abutting the left lateral prevertebral soft tissues. These were   better characterized on the prior abdomen and pelvis CT 03/17/2023.       Assessment and Plan:  #  Metastatic spindle cell sarcoma  S/p progression on multiple  lines of therapy.  Has not tolerated prior chemotherapy well. Had Trabectedin November 2022 that had severe toxicities resulting in multiple hospitalizations. Most recently tried Pazopanib x 1 week and had to stop for side effects.      He continues to decline. Concern his prognosis is limited. Agree with hospice enrollment ASAP. Hospice is coming out to the house today.     Will update Dr. Wolff and cancel all future appointments with plan to enroll in hospice.      # Lower extremity weakness  # Urinary incontinence   Improved, unclear etiology. MRI Thoracic and lumbar spine reviewed. He does have degenerative changes but not signs of cord compression.    Will reduce Dex to 4mg daily. Would like to keep on with enrollment in hospice to hopefully help with energy and appetite, though defer to hospice team if ongoing taper needed.       # Episodes of AMS  # Dropping things  Improved since stopping Methadone. Will continue to hold.      # Edema  # WALKER  # Concern for chemotherapy cardiac toxicity  No active concerns and given plans to enroll in hospice will not work-up further.      # Poor oral intake  # Dysgeusia  # Hypophos  # Hypokalemia  OK to stop supplements with plan to enroll in hospice. Continue Zyprexa for nausea and poor appetite along with mood.      # Cancer related pain  Continue Oxycodone- increase to 10mg every 4 hours given progressive pain. Continue topical lidocaine. Hospice will be taking over and discussed likely need to further increase or change narcotics.      # Hypopituitarism  # Hypothyroidism  Continues Hydrocortisone and Synthroid. Would keep this on during hospice.      # Nausea, dyspepsia  Continue Zofran and Compazine. Now also on Zyprexa. Tums PRN.      #  Depression/anxiety  Continue Lexapro and Zyprexa.      # HTN  Continue losartan. Not discussed today.     35 minutes spent on the date of the encounter doing chart review, review of test results, interpretation of tests, patient  visit, documentation and discussion with other provider(s)     Maynor Rios PA-C  Department of Hematology and Oncology  [facility]

## 2023-05-12 NOTE — NURSING NOTE
Is the patient currently in the state of [place]? YES    Visit mode:VIDEO    If the visit is dropped, the patient can be reconnected by: VIDEO VISIT: Send to e-mail at: [email or web address]    Will anyone else be joining the visit? NO      How would you like to obtain your AVS? MyChart    Are changes needed to the allergy or medication list? NO    Reason for visit: Video Visit      JENNIFER Ferrer

## 2023-05-12 NOTE — LETTER
5/12/2023         RE: Ifrah [place]uitron  [id number] [address] [facility] [address]        Dear Colleague,    Thank you for referring your patient, Ifrah Huitron, to the [facility]. Please see a copy of my visit note below.    Virtual Visit Details    Type of service:  Video Visit   Video Start Time: 8:30am  Video End Time: 8:50am    Originating Location (pt. 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 History as follows:  - March 2021 presented with chest pain and back pain, imaging with lung mets and biopsy of mediastinal mass consistent with spindle cell sarcoma with high PD-L1. Baseline imaging lung mets, left sided subdiaphragmatic mass, liver lesions.   - June 2021-October 2021 Pembrolizumab, tolerated well, stopped due to disease progression  - October 2021-February 2022 Doxil + Ifos, not tolerated well, required multiple treatment delays, IVF support, dose reductions, horrible mucositis. Stopped due to tolerance  - March 2022-August 2022 Doxil alone, not tolerated well with ongoing skin and mouth toxicity, requiring delays and dose reductions. Stopped due to disease progression  - August 2022-November 2022 Gemzar alone, not tolerated well with cytopenias and edema, stopped due to disease progression  - November 2022 Trabectedin, only had one cycle. Horrible toxicity with HUSSEIN, hepatic toxicity, cytopenias, required hospital admission. Stopped due to poor tolerance and disease progression  -December 2022 Admission for acute cholecystitis s/p lap carmelita 12/24/22, repeat admission after discharge for sepsis and pain     Plan to start Pazopanib as next line therapy for sarcoma, however haven't been able to start  due to ongoing fatigue, fevers (ED visit 1/10/23 discharged on Levaquin), and poorly controlled pain.     CT 1/25/23 with slight progression of splenic mass, otherwise stable disease. As such opted to do radiation to mass and hold off on chemotherapy for the time.      He has continued off treatment. CT 3/17/23 with disease progression. Started Pazopanib 3/28/23. Held 4/5/23 for toxicity (fatigue, dizziness, anorexia, weakness).      We have not been able to do any further chemotherapy with ongoing clinical decline. Now in discussion for hospice.     Interval History:  Ifrah is not doing well. He had a couple good days with higher dose Dex but now is not doing well. He is very fatigued, spending a lot of his day sleeping. He is weak, poor appetite. His mental clarity and dropping things has improved with stopping methadone, though he still feels groggy. He is having more back pain and Oxycodone 15mg is only helping some. Urinary incontinence improved. He has a hospital bed which has helped so he doesn't have to do stairs. Hospice is coming out later today.     Current Outpatient Medications   Medication Sig Dispense Refill     acetaminophen (TYLENOL) 325 MG tablet Take 2 tablets (650 mg) by mouth every 6 hours as needed for mild pain or other (and adjunct with moderate or severe pain or per patient request)       aspirin-acetaminophen-caffeine (EXCEDRIN MIGRAINE) 250-250-65 MG tablet Take 1 tablet by mouth daily as needed for headaches       dexamethasone (DECADRON) 4 MG tablet Take 2 tablets (8 mg) by mouth daily (with breakfast) 60 tablet 0     doxepin (SINEQUAN) 10 MG/ML (HIGH CONC) solution Take 2.5 mLs (25 mg) by mouth 2 times daily as needed (rinse for mucositis) Dilute the 2.5 mL of doxepin to 5 ml total in sterile or distilled water. 118 mL 0     escitalopram (LEXAPRO) 10 MG tablet Take 1 tablet (10 mg) by mouth daily 30 tablet 0     guaiFENesin-codeine (GUAIFENESIN AC) 100-10 MG/5ML syrup Take 10 mLs by mouth  "every 4 hours as needed for cough 118 mL 0     hydrocortisone (CORTEF) 10 MG tablet Take 20 mg in the morning and 10 mg in the afternoon 270 tablet 2     Insulin Syringe-Needle U-100 27.5G X 5/8\" 2 ML MISC 1 each daily as needed (with solucortef for adrenal crisis) 10 each 1     levothyroxine (SYNTHROID/LEVOTHROID) 75 MCG tablet Take one tab p.o. 6 days per week and 0.5 tabs one day per week ( total of 6.5 tablets weekly) 90 tablet 2     lidocaine (XYLOCAINE) 5 % external ointment Apply topically 4 times daily as needed for moderate pain (4-6) 240 g 1     LORazepam (ATIVAN) 0.5 MG tablet Take 1 tablet one hour before MRI. Take another tablet 30min before MRI if still anxious. Do not take with Oxycodone. 2 tablet 0     losartan (COZAAR) 25 MG tablet Take 2 tablets (50 mg) by mouth daily 60 tablet 3     naloxone (NARCAN) 4 MG/0.1ML nasal spray Spray 1 spray (4 mg) into one nostril alternating nostrils as needed for opioid reversal every 2-3 minutes until assistance arrives 0.2 mL 0     OLANZapine (ZYPREXA) 5 MG tablet Take 1 tablet (5 mg) by mouth At Bedtime 90 tablet 3     ondansetron (ZOFRAN) 4 MG tablet Take 1-2 tablets (4-8 mg) by mouth every 8 hours as needed for nausea 60 tablet 3     oxyCODONE (ROXICODONE) 5 MG tablet Take 1-2 tablets (5-10 mg) by mouth every 4 hours as needed for moderate to severe pain 100 tablet 0     phosphorus tablet 250 mg (PHOSPHA 250 NEUTRAL) 250 MG per tablet Take 1 tablet (250 mg) by mouth daily 90 tablet 3     potassium chloride ER (KLOR-CON M) 20 MEQ CR tablet Take 1 tablet (20 mEq) by mouth daily 90 tablet 3     SUMAtriptan (IMITREX) 50 MG tablet Take 1 tablet (50 mg) by mouth at onset of headache for migraine May repeat in 2 hours. Max 4 tablets/24 hours. 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     Thyroid " disease     removed pituitary gland     Past Surgical History:   Procedure Laterality Date     COLONOSCOPY N/A 12/17/2020    Procedure: COLONOSCOPY;  Surgeon: Ken Camacho MD;  Location: [facility]     ENDOSCOPIC RETROGRADE CHOLANGIOPANCREATOGRAM N/A 12/23/2022    Procedure: ENDOSCOPIC RETROGRADE CHOLANGIOPANCREATOGRAPHY;  Surgeon: Jim Lamar MD;  Location: [facility] OR     ENT SURGERY       ESOPHAGOSCOPY, GASTROSCOPY, DUODENOSCOPY (EGD), COMBINED N/A 12/27/2022    Procedure: ESOPHAGOGASTRODUODENOSCOPY (EGD);  Surgeon: Brenton Francois MD;  Location: [facility] GI     HERNIA REPAIR       INSERT PORT VASCULAR ACCESS Right 1/28/2022    Procedure: INSERTION, VASCULAR ACCESS PORT;  Surgeon: Daniel Kinney MD;  Location: [facility] OR     IR CHEST PORT PLACEMENT > 5 YRS OF AGE  1/28/2022     LAPAROSCOPIC CHOLECYSTECTOMY N/A 12/24/2022    Procedure: CHOLECYSTECTOMY, LAPAROSCOPIC;  Surgeon: Froy More DO;  Location: [facility] OR     LARYNGOSCOPY, EXCISE VOCAL CORD LESION MICROSCOPIC, COMBINED Left 07/01/2021    Procedure: MICROLARYNGOSCOPY, LEFT TRUE VOCAL CORD INJECTION WITH PROLARYN;  Surgeon: Kyree Bearden MD;  Location: [facility]     PHACOEMULSIFICATION WITH STANDARD INTRAOCULAR LENS IMPLANT Right 03/10/2021    Procedure: Cataract removal with implant.;  Surgeon: Jamir Mac MD;  Location: [facility]     PHACOEMULSIFICATION WITH STANDARD INTRAOCULAR LENS IMPLANT Left 04/05/2021    Procedure: Cataract removal with implant.;  Surgeon: Jamir Mac MD;  Location: [facility]     PICC DOUBLE LUMEN PLACEMENT Right 12/01/2021    5FR DL PICC, basilic vein. L-38cm, 1cm out.     PICC DOUBLE LUMEN PLACEMENT Right 01/04/2022    Right cephalic, 41 cm, 1 external length     PITUITARY EXCISION       tooth pulled 4/7  Right      Allergies   Allergen Reactions     Penicillins Hives and Swelling     Occurred as small child   Pt tolerated meropenem 12/23/22       Social History   Social History      Tobacco Use     Smoking status: Never     Smokeless tobacco: Never   Substance Use Topics     Alcohol use: Yes     Comment: rare     Drug use: Never      Past medical history and social history were reviewed.    Physical Examination:  There were no vitals taken for this visit.  Wt Readings from Last 10 Encounters:   04/18/23 63.5 kg (140 lb)   04/14/23 65.8 kg (145 lb)   03/27/23 63 kg (139 lb)   03/24/23 65.8 kg (145 lb)   03/01/23 66.5 kg (146 lb 9.6 oz)   02/15/23 66.5 kg (146 lb 9.6 oz)   02/06/23 63.3 kg (139 lb 9.6 oz)   01/26/23 59.3 kg (130 lb 12.8 oz)   01/19/23 59.5 kg (131 lb 3.2 oz)   01/10/23 63.5 kg (140 lb)     Video physical exam  General: Patient appears fatigued, chronically ill in no acute distress.   Skin: No visualized rash or lesions on visualized skin  Eyes: EOMI, no erythema, sclera icterus or discharge noted  Resp: Appears to be breathing comfortably without accessory muscle usage, speaking in full sentences, no cough  MSK: Appears to have normal range of motion based on visualized movements  Neurologic: Asymmetry to face, stable from prior   Psych: affect flat, alert and oriented    Laboratory Data:  No labs to review    MRI Thoracic  IMPRESSION:  1.  Mild to moderate spinal canal narrowing at T10-T11.  2.  Mild spinal canal narrowings at T3-T4 and T4-T5.  3.  No high-grade neural foraminal narrowing.  4.  Ill-defined soft tissue mass involving the left kidney and involving the spleen. This is better characterized on the prior chest, abdomen and pelvis CT 03/17/2023.     MRI Lumbar  IMPRESSION:  1.  Mild degenerative changes of the lumbar spine.  2.  No high-grade spinal canal narrowing.  3.  Moderate right and mild left neuroforaminal narrowing at L3-L4.  4.  Mild to moderate bilateral neuroforaminal narrowing at L5-S1.  5.  Partially visualized heterogeneously enhancing mass involving the left kidney and spleen as well as heterogeneously enhancing soft tissue mass along the left  retroperitoneal soft tissue abutting the left lateral prevertebral soft tissues. These were   better characterized on the prior abdomen and pelvis CT 03/17/2023.       Assessment and Plan:  #  Metastatic spindle cell sarcoma  S/p progression on multiple lines of therapy.  Has not tolerated prior chemotherapy well. Had Trabectedin November 2022 that had severe toxicities resulting in multiple hospitalizations. Most recently tried Pazopanib x 1 week and had to stop for side effects.      He continues to decline. Concern his prognosis is limited. Agree with hospice enrollment ASAP. Hospice is coming out to the house today.     Will update Dr. Wolff and cancel all future appointments with plan to enroll in hospice.      # Lower extremity weakness  # Urinary incontinence   Improved, unclear etiology. MRI Thoracic and lumbar spine reviewed. He does have degenerative changes but not signs of cord compression.    Will reduce Dex to 4mg daily. Would like to keep on with enrollment in hospice to hopefully help with energy and appetite, though defer to hospice team if ongoing taper needed.       # Episodes of AMS  # Dropping things  Improved since stopping Methadone. Will continue to hold.      # Edema  # WALKER  # Concern for chemotherapy cardiac toxicity  No active concerns and given plans to enroll in hospice will not work-up further.      # Poor oral intake  # Dysgeusia  # Hypophos  # Hypokalemia  OK to stop supplements with plan to enroll in hospice. Continue Zyprexa for nausea and poor appetite along with mood.      # Cancer related pain  Continue Oxycodone- increase to 10mg every 4 hours given progressive pain. Continue topical lidocaine. Hospice will be taking over and discussed likely need to further increase or change narcotics.      # Hypopituitarism  # Hypothyroidism  Continues Hydrocortisone and Synthroid. Would keep this on during hospice.      # Nausea, dyspepsia  Continue Zofran and Compazine. Now also on  Zyprexa. Tums PRN.      #  Depression/anxiety  Continue Lexapro and Zyprexa.      # HTN  Continue losartan. Not discussed today.     35 minutes spent on the date of the encounter doing chart review, review of test results, interpretation of tests, patient visit, documentation and discussion with other provider(s)     Maynor Rios PA-C  Department of Hematology and Oncology  [facility]         Again, thank you for allowing me to participate in the care of your patient.        Sincerely,        GERALDO Mullins

## 2023-05-12 NOTE — LETTER
5/12/2023         RE: Ifrah Huitron  [address] [address] [facility] [address]        Dear Colleague,    Thank you for referring your patient, Ifrah Huitron, to the [facility]. Please see a copy of my visit note below.    Virtual Visit Details    Type of service:  Video Visit   Video Start Time: 8:30am  Video End Time: 8:50am    Originating Location (pt. 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 History as follows:  - March 2021 presented with chest pain and back pain, imaging with lung mets and biopsy of mediastinal mass consistent with spindle cell sarcoma with high PD-L1. Baseline imaging lung mets, left sided subdiaphragmatic mass, liver lesions.   - June 2021-October 2021 Pembrolizumab, tolerated well, stopped due to disease progression  - October 2021-February 2022 Doxil + Ifos, not tolerated well, required multiple treatment delays, IVF support, dose reductions, horrible mucositis. Stopped due to tolerance  - March 2022-August 2022 Doxil alone, not tolerated well with ongoing skin and mouth toxicity, requiring delays and dose reductions. Stopped due to disease progression  - August 2022-November 2022 Gemzar alone, not tolerated well with cytopenias and edema, stopped due to disease progression  - November 2022 Trabectedin, only had one cycle. Horrible toxicity with HUSSEIN, hepatic toxicity, cytopenias, required hospital admission. Stopped due to poor tolerance and disease progression  -December 2022 Admission for acute cholecystitis s/p lap carmelita 12/24/22, repeat admission after discharge for sepsis and pain     Plan to start Pazopanib as next line therapy for sarcoma, however haven't been able to start  due to ongoing fatigue, fevers (ED visit 1/10/23 discharged on Levaquin), and poorly controlled pain.     CT 1/25/23 with slight progression of splenic mass, otherwise stable disease. As such opted to do radiation to mass and hold off on chemotherapy for the time.      He has continued off treatment. CT 3/17/23 with disease progression. Started Pazopanib 3/28/23. Held 4/5/23 for toxicity (fatigue, dizziness, anorexia, weakness).      We have not been able to do any further chemotherapy with ongoing clinical decline. Now in discussion for hospice.     Interval History:  Ifrah is not doing well. He had a couple good days with higher dose Dex but now is not doing well. He is very fatigued, spending a lot of his day sleeping. He is weak, poor appetite. His mental clarity and dropping things has improved with stopping methadone, though he still feels groggy. He is having more back pain and Oxycodone 15mg is only helping some. Urinary incontinence improved. He has a hospital bed which has helped so he doesn't have to do stairs. Hospice is coming out later today.     Current Outpatient Medications   Medication Sig Dispense Refill     acetaminophen (TYLENOL) 325 MG tablet Take 2 tablets (650 mg) by mouth every 6 hours as needed for mild pain or other (and adjunct with moderate or severe pain or per patient request)       aspirin-acetaminophen-caffeine (EXCEDRIN MIGRAINE) 250-250-65 MG tablet Take 1 tablet by mouth daily as needed for headaches       dexamethasone (DECADRON) 4 MG tablet Take 2 tablets (8 mg) by mouth daily (with breakfast) 60 tablet 0     doxepin (SINEQUAN) 10 MG/ML (HIGH CONC) solution Take 2.5 mLs (25 mg) by mouth 2 times daily as needed (rinse for mucositis) Dilute the 2.5 mL of doxepin to 5 ml total in sterile or distilled water. 118 mL 0     escitalopram (LEXAPRO) 10 MG tablet Take 1 tablet (10 mg) by mouth daily 30 tablet 0     guaiFENesin-codeine (GUAIFENESIN AC) 100-10 MG/5ML syrup Take 10 mLs by mouth  "every 4 hours as needed for cough 118 mL 0     hydrocortisone (CORTEF) 10 MG tablet Take 20 mg in the morning and 10 mg in the afternoon 270 tablet 2     Insulin Syringe-Needle U-100 27.5G X 5/8\" 2 ML MISC 1 each daily as needed (with solucortef for adrenal crisis) 10 each 1     levothyroxine (SYNTHROID/LEVOTHROID) 75 MCG tablet Take one tab p.o. 6 days per week and 0.5 tabs one day per week ( total of 6.5 tablets weekly) 90 tablet 2     lidocaine (XYLOCAINE) 5 % external ointment Apply topically 4 times daily as needed for moderate pain (4-6) 240 g 1     LORazepam (ATIVAN) 0.5 MG tablet Take 1 tablet one hour before MRI. Take another tablet 30min before MRI if still anxious. Do not take with Oxycodone. 2 tablet 0     losartan (COZAAR) 25 MG tablet Take 2 tablets (50 mg) by mouth daily 60 tablet 3     naloxone (NARCAN) 4 MG/0.1ML nasal spray Spray 1 spray (4 mg) into one nostril alternating nostrils as needed for opioid reversal every 2-3 minutes until assistance arrives 0.2 mL 0     OLANZapine (ZYPREXA) 5 MG tablet Take 1 tablet (5 mg) by mouth At Bedtime 90 tablet 3     ondansetron (ZOFRAN) 4 MG tablet Take 1-2 tablets (4-8 mg) by mouth every 8 hours as needed for nausea 60 tablet 3     oxyCODONE (ROXICODONE) 5 MG tablet Take 1-2 tablets (5-10 mg) by mouth every 4 hours as needed for moderate to severe pain 100 tablet 0     phosphorus tablet 250 mg (PHOSPHA 250 NEUTRAL) 250 MG per tablet Take 1 tablet (250 mg) by mouth daily 90 tablet 3     potassium chloride ER (KLOR-CON M) 20 MEQ CR tablet Take 1 tablet (20 mEq) by mouth daily 90 tablet 3     SUMAtriptan (IMITREX) 50 MG tablet Take 1 tablet (50 mg) by mouth at onset of headache for migraine May repeat in 2 hours. Max 4 tablets/24 hours. 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 OR     ENT SURGERY       ESOPHAGOSCOPY, GASTROSCOPY, DUODENOSCOPY (EGD), COMBINED N/A 12/27/2022    Procedure: ESOPHAGOGASTRODUODENOSCOPY (EGD);  Surgeon: Brenton Francois MD;  Location: [facility] GI     HERNIA REPAIR       INSERT PORT VASCULAR ACCESS Right 1/28/2022    Procedure: INSERTION, VASCULAR ACCESS PORT;  Surgeon: Daniel Kinney MD;  Location: [facility]     IR CHEST PORT PLACEMENT > 5 YRS OF AGE  1/28/2022     LAPAROSCOPIC CHOLECYSTECTOMY N/A 12/24/2022    Procedure: CHOLECYSTECTOMY, LAPAROSCOPIC;  Surgeon: Froy More DO;  Location: [facility] OR     LARYNGOSCOPY, EXCISE VOCAL CORD LESION MICROSCOPIC, COMBINED Left 07/01/2021    Procedure: MICROLARYNGOSCOPY, LEFT TRUE VOCAL CORD INJECTION WITH PROLARYN;  Surgeon: Kyree Bearden MD;  Location: [facility] OR     PHACOEMULSIFICATION WITH STANDARD INTRAOCULAR LENS IMPLANT Right 03/10/2021    Procedure: Cataract removal with implant.;  Surgeon: Jamir Mac MD;  Location: [facility]     PHACOEMULSIFICATION WITH STANDARD INTRAOCULAR LENS IMPLANT Left 04/05/2021    Procedure: Cataract removal with implant.;  Surgeon: Jamir Mac MD;  Location: [facility] OR     PICC DOUBLE LUMEN PLACEMENT Right 12/01/2021    5FR DL PICC, basilic vein. L-38cm, 1cm out.     PICC DOUBLE LUMEN PLACEMENT Right 01/04/2022    Right cephalic, 41 cm, 1 external length     PITUITARY EXCISION       tooth pulled 4/7  Right      Allergies   Allergen Reactions     Penicillins Hives and Swelling     Occurred as small child   Pt tolerated meropenem 12/23/22       Social History   Social History      Tobacco Use     Smoking status: Never     Smokeless tobacco: Never   Substance Use Topics     Alcohol use: Yes     Comment: rare     Drug use: Never      Past medical history and social history were reviewed.    Physical Examination:  There were no vitals taken for this visit.  Wt Readings from Last 10 Encounters:   04/18/23 63.5 kg (140 lb)   04/14/23 65.8 kg (145 lb)   03/27/23 63 kg (139 lb)   03/24/23 65.8 kg (145 lb)   03/01/23 66.5 kg (146 lb 9.6 oz)   02/15/23 66.5 kg (146 lb 9.6 oz)   02/06/23 63.3 kg (139 lb 9.6 oz)   01/26/23 59.3 kg (130 lb 12.8 oz)   01/19/23 59.5 kg (131 lb 3.2 oz)   01/10/23 63.5 kg (140 lb)     Video physical exam  General: Patient appears fatigued, chronically ill in no acute distress.   Skin: No visualized rash or lesions on visualized skin  Eyes: EOMI, no erythema, sclera icterus or discharge noted  Resp: Appears to be breathing comfortably without accessory muscle usage, speaking in full sentences, no cough  MSK: Appears to have normal range of motion based on visualized movements  Neurologic: Asymmetry to face, stable from prior   Psych: affect flat, alert and oriented    Laboratory Data:  No labs to review    MRI Thoracic  IMPRESSION:  1.  Mild to moderate spinal canal narrowing at T10-T11.  2.  Mild spinal canal narrowings at T3-T4 and T4-T5.  3.  No high-grade neural foraminal narrowing.  4.  Ill-defined soft tissue mass involving the left kidney and involving the spleen. This is better characterized on the prior chest, abdomen and pelvis CT 03/17/2023.     MRI Lumbar  IMPRESSION:  1.  Mild degenerative changes of the lumbar spine.  2.  No high-grade spinal canal narrowing.  3.  Moderate right and mild left neuroforaminal narrowing at L3-L4.  4.  Mild to moderate bilateral neuroforaminal narrowing at L5-S1.  5.  Partially visualized heterogeneously enhancing mass involving the left kidney and spleen as well as heterogeneously enhancing soft tissue mass along the left  retroperitoneal soft tissue abutting the left lateral prevertebral soft tissues. These were   better characterized on the prior abdomen and pelvis CT 03/17/2023.       Assessment and Plan:  #  Metastatic spindle cell sarcoma  S/p progression on multiple lines of therapy.  Has not tolerated prior chemotherapy well. Had Trabectedin November 2022 that had severe toxicities resulting in multiple hospitalizations. Most recently tried Pazopanib x 1 week and had to stop for side effects.      He continues to decline. Concern his prognosis is limited. Agree with hospice enrollment ASAP. Hospice is coming out to the house today.     Will update Dr. Wolff and cancel all future appointments with plan to enroll in hospice.      # Lower extremity weakness  # Urinary incontinence   Improved, unclear etiology. MRI Thoracic and lumbar spine reviewed. He does have degenerative changes but not signs of cord compression.    Will reduce Dex to 4mg daily. Would like to keep on with enrollment in hospice to hopefully help with energy and appetite, though defer to hospice team if ongoing taper needed.       # Episodes of AMS  # Dropping things  Improved since stopping Methadone. Will continue to hold.      # Edema  # WALKER  # Concern for chemotherapy cardiac toxicity  No active concerns and given plans to enroll in hospice will not work-up further.      # Poor oral intake  # Dysgeusia  # Hypophos  # Hypokalemia  OK to stop supplements with plan to enroll in hospice. Continue Zyprexa for nausea and poor appetite along with mood.      # Cancer related pain  Continue Oxycodone- increase to 10mg every 4 hours given progressive pain. Continue topical lidocaine. Hospice will be taking over and discussed likely need to further increase or change narcotics.      # Hypopituitarism  # Hypothyroidism  Continues Hydrocortisone and Synthroid. Would keep this on during hospice.      # Nausea, dyspepsia  Continue Zofran and Compazine. Now also on  Zyprexa. Tums PRN.      #  Depression/anxiety  Continue Lexapro and Zyprexa.      # HTN  Continue losartan. Not discussed today.     35 minutes spent on the date of the encounter doing chart review, review of test results, interpretation of tests, patient visit, documentation and discussion with other provider(s)     Maynor Rios PA-C  Department of Hematology and Oncology  [facility]         Again, thank you for allowing me to participate in the care of your patient.        Sincerely,        GERALDO Mullins

## 2023-05-16 NOTE — TELEPHONE ENCOUNTER
NASREEN Thompson: withdrew patient - never filled.    Jessie Naidu CPhT  [facility]  Oncology Pharmacy Liaison II  [email or web address]  [phone] (phone  [phone] (fax

## 2023-05-16 NOTE — ORAL ONC MGMT
[facility] Oral Chemotherapy Monitoring Program    Thank you for the opportunity to be a part in the care of this patient's oral chemotherapy. The oncology pharmacy will no longer be following this patient for oral chemotherapy. If there are any questions or the plan changes, feel free to contact us.        12/30/2022     3:00 PM 1/9/2023     8:00 AM 3/24/2023     1:00 PM 3/24/2023     3:00 PM 3/30/2023     4:00 PM 4/6/2023     2:00 PM 5/16/2023    10:00 AM   ORAL CHEMOTHERAPY   Assessment Type New Teach Refill Left Voicemail New Teach Lab Monitoring Lab Monitoring;Initial Follow up Discontinuation   Stop Date       5/12/2023   Reason for Discontinuation       Disease progression   Diagnosis Code Sarcoma Sarcoma Sarcoma Sarcoma Sarcoma Sarcoma Sarcoma   Providers Dr. Mariella Wolff   [facility]   Drug Name Votrient (pazopanib) Votrient (pazopanib) Votrient (pazopanib) Votrient (pazopanib) Votrient (pazopanib) Votrient (pazopanib) Votrient (pazopanib)   Dose 800 mg 800 mg 800 mg 800 mg 800 mg 800 mg    Current Schedule Daily Daily  Daily Daily Daily    Cycle Details Continuous Continuous Continuous Continuous Continuous Continuous    Any new drug interactions? Yes         Pharmacist Intervention? Yes         Intervention(s) Drug changed (non-chemo)         Is the dose as ordered appropriate for the patient? Yes

## 2023-06-01 ENCOUNTER — HEALTH MAINTENANCE LETTER (OUTPATIENT)
Age: 73
End: 2023-06-01

## 2023-06-07 NOTE — TELEPHONE ENCOUNTER
RECORDS STATUS - ALL OTHER DIAGNOSIS      RECORDS RECEIVED FROM: [facility]   DATE RECEIVED: 4/1   NOTES STATUS DETAILS   OFFICE NOTE from referring provider [facility] Dr. Sukhdev Kohler: 3/29/21   OFFICE NOTE from medical oncologist     DISCHARGE SUMMARY from hospital     DISCHARGE REPORT from the ER     OPERATIVE REPORT Epic 12/17/20, 7/31/14: Colonoscopy   MEDICATION LIST [facility] 2/5/21   CLINICAL TRIAL TREATMENTS TO DATE     LABS     PATHOLOGY REPORTS NA    ANYTHING RELATED TO DIAGNOSIS Epic 3/29/21   GENONOMIC TESTING     TYPE:     IMAGING (NEED IMAGES & REPORT)     XR PACS 3/23/21: Epic   CT SCANS PACS 3/29/21, 3/26/21: [facility]   MRI     MAMMO     ULTRASOUND     PET        No

## 2023-06-12 ENCOUNTER — PATIENT OUTREACH (OUTPATIENT)
Dept: ONCOLOGY | Facility: CLINIC | Age: 73
End: 2023-06-12
Payer: MEDICARE

## 2023-06-12 NOTE — TELEPHONE ENCOUNTER
"Date of death State file number  [id number]      Verified on [email or web address]    \"Notification of \" form completed and emailed to \"[email or web address]  on 2023      "

## 2024-10-15 NOTE — LETTER
10/4/2021         RE: Ifrah Huitron  [address] [place] [address]        Dear Colleague,    Thank you for referring your patient, Ifrah Huitron, to the [facility] [place]. Please see a copy of my visit note below.    Oncology/Hematology Visit Note  Oct 4, 2021      Reason for Visit: Follow up of spindle cell sarcoma     History of Present Illness: Ifrah Huitron is a 71 year old male with PMH rosacea, pituitary macroadenoma s/p resection, GERD, kidney stones, DJD with metastatic spindle cell sarcoma. He presented to his PCP March 2021 with chest pain/left shoulder and back pain that led to imaging which revealed multiple lung mets. There were difficulties in getting diagnostic biopsy, eventually biopsy of mediastinal mass with spindle cell sarcoma. There was high PD-L1 expression (90%). Baseline imaging 6/21/21 with progression of lung mets and left-sided subdiaphragmatic mass, stable liver lesions. 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      He started Keytruda 6/21/21. CT 8/2/21 with positive response to treatment. He was called today for oncology follow-up.     Interval History:  Ifrah was called today for follow-up. He hasn't been feeling as well the past few days. He has more dyspepsia, tums has helped. He also has more pain in his back/chest wall, taking Celebrex and Bengay with some relief. He denies fevers. Has had less issues with headaches and dizziness, though still has episodes and hasn't been able to connect yet with endocrinology. His SOB is slightly better, hoarseness and cough persists. He denies any vomiting, diarrhea, skin issues. Still doing his day to day activities. Overall denies any issues with the Keytruda itself.  His R hand numbness has resolved.     Current Outpatient Medications   Medication Sig Dispense Refill     celecoxib (CELEBREX) 200 MG capsule Take 1 capsule  Airway    Performed by: Damion Victoria  Authorized by: Mehran Villeda MD    Final Airway Type:  Endotracheal airway  Final Endotracheal Airway*:  MARYANN tube  ETT Size (mm)*:  7.0  Cuff*:  Regular  Technique Used for Successful ETT Placement:  Direct laryngoscopy  Intubation Procedure*:  ETCO2, Preoxygenation, Dentition Unchanged, Atraumatic and Pharynx Clear  Insertion Site:  Oral  Blade Type*:  MAC  Blade Size*:  3  Measured from*:  Lips  Secured at (cm)*:  21  Placement Verified by: auscultation, capnometry and equal breath sounds    Glottic View*:  1 - full view of glottis  Attempts*:  1   Patient Identified, Procedure confirmed, Emergency equipment available and Safety protocols followed  Location:  OR  Urgency:  Elective  Indications for Airway Management:  Anesthesia  Spontaneous Ventilation: absent    Sedation Level:  Anesthetized  Mask Difficulty Assessment:  1 - vent by mask  Start Time: 10/15/2024 11:37 AM       (200 mg) by mouth 2 times daily . Note this is a new a strength! Only one capsule at a time! 60 capsule 3     cholecalciferol (VITAMIN D-1000 MAX ST) 1000 units TABS Take 1,000 Units by mouth daily        guaiFENesin-codeine (GUAIFENESIN AC) 100-10 MG/5ML syrup Take 10 mLs by mouth every 4 hours as needed for cough 118 mL 0     hydrocortisone (CORTEF) 10 MG tablet Take 15 mg in the morning and 10 mg in the afternoon       HYDROmorphone (DILAUDID) 2 MG tablet Take 0.5-1 tablets (1-2 mg) by mouth 3 times daily as needed for severe pain (Patient not taking: Reported on 9/1/2021) 14 tablet 0     levothyroxine (SYNTHROID/LEVOTHROID) 75 MCG tablet Take 75 mcg by mouth every morning        metroNIDAZOLE (METROGEL) 0.75 % external gel Apply topically 2 times daily 45 g 11     omeprazole (PRILOSEC) 20 MG DR capsule Take 20 mg by mouth       prochlorperazine (COMPAZINE) 10 MG tablet Take 1 tablet (10 mg) by mouth every 6 hours as needed for nausea or vomiting 30 tablet 3     rosuvastatin (CRESTOR) 40 MG tablet Take 1 tablet (40 mg) by mouth daily 90 tablet 1     SUMAtriptan (IMITREX) 50 MG tablet Take 1 tablet (50 mg) by mouth at onset of headache for migraine May repeat in 2 hours. Max 4 tablets/24 hours. 6 tablet 1     triamcinolone (KENALOG) 0.1 % external cream Apply topically 2 times daily        Physical Examination:  There were no vitals taken for this visit.  Wt Readings from Last 10 Encounters:   09/17/21 73 kg (161 lb)   09/01/21 72.6 kg (160 lb)   08/11/21 72.6 kg (160 lb)   08/02/21 72.8 kg (160 lb 9.6 oz)   07/12/21 73 kg (161 lb)   07/01/21 73 kg (161 lb)   06/23/21 73.4 kg (161 lb 12.8 oz)   06/21/21 74.7 kg (164 lb 11.2 oz)   06/21/21 71.7 kg (158 lb)   06/08/21 71.8 kg (158 lb 6.4 oz)     Video physical exam  General: Patient appears well in no acute distress.   Skin: No visualized rash or lesions on visualized skin  Eyes: EOMI, no erythema, sclera icterus or discharge noted  Resp: Appears to be breathing  comfortably without accessory muscle usage, speaking in full sentences, no cough  MSK: Appears to have normal range of motion based on visualized movements  Neurologic: No apparent tremors, facial movements symmetric  Psych: affect normal, alert and oriented    The rest of a comprehensive physical examination is deferred due to PHE (public health emergency) video restrictions    Laboratory Data:    To be done with infusion today    Assessment and Plan:  1. 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 High PD-L1. Started on Keytruda 6/21/21 and has received 4 cycles. CT 8/2/21 with positive response to treatment. He is having dyspnea related to talking but have r/o pneumonitis previously. He is overall doing well on treatment.     As long as labs are WNL today will go ahead with cycle 6.     Plan to continue treatment every 3 weeks and repeat CT imaging end of October-scheduled in 2 weeks.    Did put in orders for extra 1L IVF with treatment today due to generally feeling poorly after multiple scans/IV contrast last week.     2. GI  Diarrhea: Possibly related to Keytruda, resolved. Continue Pepto PRN.     Dyspepsia: Improved with Omeprazole daily. Can take Tums/Pepto Bismol PRN. 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 Cal Saba endocrine. Requested referral to our endocrinology-ordered.      TSH to be checked today, continue Synthroid 75mcg daily.      4. Derm  Rosacea: Long standing issue, now just continue Metrogel PRN which is helping.     5. MSK  Left shoulder/back/chest wall pain 2/2 tumor, following with palliative. Continue topical Bengay and PO Celebrex BID. Pain worsened when cut back so no changes to meds. Will assess response to treatment on upcoming CT.      6. ENT  Hoarseness: Thought 2/2 mediastinal mass vs irritation from prior biopsy. Following with  ENT, s/p vocal cord infection 7/1/21. Will see our voice clinic in Dec due to ongoing issues.      7. Pulm/Cards  Dyspnea with talking, prior work-up with CT PE negative for PE, infection, pneumonitis; troponin negative; COVID negative. Sating well on RA. Agree with speech pathology thoughts on laryngeal dysfunction. Symptoms stable.      Continue Guaifenesin-Coedine PRN for cough, slightly improved.    Saw cardiology, echo WNL. Has mild CAD, recommended to start on statin. Will monitor for arthralgias and LFT elevation.     40 minutes spent on the date of the encounter doing chart review, patient visit and documentation     Maynor Rios PA-C  [facility] [id number] [place] [address]  [phone]      Ifrah is a 71 year old who is being evaluated via a billable video visit.      How would you like to obtain your AVS? MyChart  If the video visit is dropped, the invitation should be resent by: Text to cell phone: [phone]  Will anyone else be joining your video visit? No      Video Start Time: 11:03am  Video-Visit Details    Type of service:  Video Visit    Video End Time: 11:32am    Originating Location (pt. Location): Home    Distant Location (provider location):  [facility]     Platform used for Video Visit: [employer]      Again, thank you for allowing me to participate in the care of your patient.        Sincerely,        GERALDO Mullins

## (undated) DEVICE — TUBING SUCTION MEDI-VAC 1/4"X20' N620A - HE

## (undated) DEVICE — KNIFE HANDLE W/15 BLADE 371615

## (undated) DEVICE — PLATE GROUNDING ADULT W/CORD 9165L

## (undated) DEVICE — ATOMIZER DEVICE LARYNGO-TRACHEAL MUCOSA MADGIC MAD600

## (undated) DEVICE — TUBING ENDOGATOR + H2O PORT CO

## (undated) DEVICE — SPONGE RAY-TEC 4X8" 7318

## (undated) DEVICE — DECANTER VIAL 2006S

## (undated) DEVICE — Device

## (undated) DEVICE — SUCTION MANIFOLD NEPTUNE 2 SYS 1 PORT 702-025-000

## (undated) DEVICE — SYR 10ML FINGER CONTROL W/O NDL 309695

## (undated) DEVICE — NDL INSUFFLATION 13GA 120MM C2201

## (undated) DEVICE — CONNECTOR MALE TO MALE LL

## (undated) DEVICE — SOL NACL 0.9% IRRIG 1000ML BOTTLE 2F7124

## (undated) DEVICE — APPLICATOR ENDOSCOPIC 5 SURGICEL POWDER 3123SPEA

## (undated) DEVICE — LINEN GOWN XLG 5407

## (undated) DEVICE — BASIN SET MINOR DISP

## (undated) DEVICE — BASIN EMESIS STERILE  SSK9005A

## (undated) DEVICE — ENDO TROCAR SLEEVE KII Z-THREADED 05X100MM CTS02

## (undated) DEVICE — SURGICEL POWDER ABSORBABLE HEMOSTAT 3GM 3013SP

## (undated) DEVICE — SUCTION STRYKERFLOW II 250-070-500

## (undated) DEVICE — ENDO TOOTH GUARD

## (undated) DEVICE — SU VICRYL 4-0 P-3 18" UND  J494H

## (undated) DEVICE — SUCTION CANISTER 1000ML 65651-510

## (undated) DEVICE — BLADE KNIFE SURG 11 371111

## (undated) DEVICE — BITE BLOCK SCOPE DISP 20 X 27 000429

## (undated) DEVICE — ENDO SHEARS RENEW LAP ENDOCUT SCISSOR TIP 16.5MM 3142

## (undated) DEVICE — SOL WATER IRRIG 500ML BOTTLE 2F7113

## (undated) DEVICE — KIT IV START DYNDV1431

## (undated) DEVICE — CATH SUCTION 14FR W/O CTRL DYND41962

## (undated) DEVICE — PEN MARKING SKIN W/LABELS 31145884

## (undated) DEVICE — AUTOTOME 44 20MM SHORT WIRE SYSTEM M00545170

## (undated) DEVICE — SYR 03ML LL W/O NDL 309657

## (undated) DEVICE — TUBING SUCTION 12"X1/4" N612

## (undated) DEVICE — SWABCAP DISINFECTANT GREEN CFF10-250

## (undated) DEVICE — SU MONOCRYL 4-0 P-3 18" UND Y494G

## (undated) DEVICE — ENDO TROCAR FIRST ENTRY KII FIOS Z-THRD 05X100MM CTF03

## (undated) DEVICE — GOWN XLG DISP 9545

## (undated) DEVICE — ENDO POUCH UNIV RETRIEVAL SYSTEM INZII 10MM CD001

## (undated) DEVICE — SOL NACL 0.9% IRRIG 1000ML BOTTLE 07138-09

## (undated) DEVICE — SU MONOCRYL+ 4-0 18IN PS2 UND MCP496G

## (undated) DEVICE — KIT INTRODUCER FLUENT MICRO 5FRX10CM ECHO TIP KIT-038-04

## (undated) DEVICE — BALLOON EXTRACTION 15X1950MM 3.2MM TL B-V243Q-A

## (undated) DEVICE — TUBING SMOKE EVAC PNEUMOCLEAR HIGH FLOW 0620050250

## (undated) DEVICE — GLOVE BIOGEL PI ORTHOPRO SZ 7.5 47675

## (undated) DEVICE — SPHINCTEROTOME BILIARY NDL KNIFE 5MM 5FR TL  4584

## (undated) DEVICE — WIRE GUIDE 0.035"X260CM DREAMWIRE M00556100

## (undated) DEVICE — CLIP LIGACLIP LG YELLOW LT400

## (undated) DEVICE — DECANTER BAG 2002S

## (undated) DEVICE — KIT CONNECTOR FOR OLYMPUS ENDOSCOPES DEFENDO 100310

## (undated) DEVICE — INFLATION DEVICE BIG 60 ENDO-AN6012

## (undated) DEVICE — EYE PREP BETADINE 5% SOLUTION 30ML 0065-0411-30

## (undated) DEVICE — CUSTOM PACK LAP CHOLE SBA5BLCHEA

## (undated) DEVICE — SPONGE COTTONOID 1/4X1/4" 20-01SW

## (undated) DEVICE — GLOVE PROTEXIS W/NEU-THERA 7.5  2D73TE75

## (undated) DEVICE — CATH BALLOON DILATING BIL FUSION TITAN 12FRX10MM FS-BDB-10X4

## (undated) DEVICE — BANDAGE ADH LF 1X3 ABN3100A

## (undated) DEVICE — DRSG STERI STRIP 1/2X4" R1547

## (undated) DEVICE — PREP CHLORAPREP 26ML TINTED HI-LITE ORANGE 930815

## (undated) DEVICE — GLOVE PROTEXIS POWDER FREE SMT 8.0  2D72PT80X

## (undated) DEVICE — SOL WATER IRRIG 1000ML BOTTLE 07139-09

## (undated) DEVICE — CONNECTOR ONE-LINK INJECTION SITE LF 7N8399

## (undated) DEVICE — ENDO TROCAR FIRST ENTRY KII FIOS Z-THRD 11X100MM CTF33

## (undated) DEVICE — SU VICRYL+ 0 27 UR6 VLT VCP603H

## (undated) DEVICE — ANTIFOG SOLUTION W/FOAM PAD 31142527

## (undated) DEVICE — CANNULA NASAL COMFORT SOFT 25FT 0537

## (undated) DEVICE — ADH SKIN CLOSURE PREMIERPRO EXOFIN 1.0ML 3470

## (undated) DEVICE — PACK BASIC 9103

## (undated) DEVICE — EYE SOL BSS 500ML

## (undated) DEVICE — COVER ULTRASOUND PROBE W/GEL FLEXI-FEEL 6"X58" LF  25-FF658

## (undated) DEVICE — DRSG TELFA 3X8" 1238

## (undated) DEVICE — SOL WATER IRRIG 1000ML BOTTLE 2F7114

## (undated) DEVICE — KIT SCOPE CLEANING ENDOSCOPY BX00719705

## (undated) DEVICE — LINEN TOWEL PACK X5 5464

## (undated) DEVICE — PACK CENTRAL LINE INSERTION SAN32CLFCG

## (undated) RX ORDER — CLINDAMYCIN PHOSPHATE 900 MG/50ML
INJECTION, SOLUTION INTRAVENOUS
Status: DISPENSED
Start: 2022-01-28

## (undated) RX ORDER — OXYCODONE HYDROCHLORIDE 5 MG/1
TABLET ORAL
Status: DISPENSED
Start: 2021-07-01

## (undated) RX ORDER — LIDOCAINE HYDROCHLORIDE 10 MG/ML
INJECTION, SOLUTION EPIDURAL; INFILTRATION; INTRACAUDAL; PERINEURAL
Status: DISPENSED
Start: 2022-01-28

## (undated) RX ORDER — LIDOCAINE HYDROCHLORIDE 40 MG/ML
SOLUTION TOPICAL
Status: DISPENSED
Start: 2021-07-01

## (undated) RX ORDER — DEXAMETHASONE SODIUM PHOSPHATE 4 MG/ML
INJECTION, SOLUTION INTRA-ARTICULAR; INTRALESIONAL; INTRAMUSCULAR; INTRAVENOUS; SOFT TISSUE
Status: DISPENSED
Start: 2021-07-01

## (undated) RX ORDER — OXYMETAZOLINE HYDROCHLORIDE 0.05 G/100ML
SPRAY NASAL
Status: DISPENSED
Start: 2021-07-01

## (undated) RX ORDER — ONDANSETRON 2 MG/ML
INJECTION INTRAMUSCULAR; INTRAVENOUS
Status: DISPENSED
Start: 2021-04-29

## (undated) RX ORDER — IVABRADINE 5 MG/1
TABLET, FILM COATED ORAL
Status: DISPENSED
Start: 2021-09-29

## (undated) RX ORDER — FENTANYL CITRATE 50 UG/ML
INJECTION, SOLUTION INTRAMUSCULAR; INTRAVENOUS
Status: DISPENSED
Start: 2021-05-20

## (undated) RX ORDER — FENTANYL CITRATE 50 UG/ML
INJECTION, SOLUTION INTRAMUSCULAR; INTRAVENOUS
Status: DISPENSED
Start: 2021-07-01

## (undated) RX ORDER — HEPARIN SODIUM (PORCINE) LOCK FLUSH IV SOLN 100 UNIT/ML 100 UNIT/ML
SOLUTION INTRAVENOUS
Status: DISPENSED
Start: 2022-01-01

## (undated) RX ORDER — SODIUM CHLORIDE 9 MG/ML
INJECTION, SOLUTION INTRAVENOUS
Status: DISPENSED
Start: 2021-05-20

## (undated) RX ORDER — LIDOCAINE HYDROCHLORIDE 10 MG/ML
INJECTION, SOLUTION EPIDURAL; INFILTRATION; INTRACAUDAL; PERINEURAL
Status: DISPENSED
Start: 2021-05-20

## (undated) RX ORDER — DEXAMETHASONE SODIUM PHOSPHATE 10 MG/ML
INJECTION, SOLUTION INTRAMUSCULAR; INTRAVENOUS
Status: DISPENSED
Start: 2022-01-01

## (undated) RX ORDER — BUPIVACAINE HYDROCHLORIDE 2.5 MG/ML
INJECTION, SOLUTION EPIDURAL; INFILTRATION; INTRACAUDAL
Status: DISPENSED
Start: 2022-01-01

## (undated) RX ORDER — HEPARIN SODIUM (PORCINE) LOCK FLUSH IV SOLN 100 UNIT/ML 100 UNIT/ML
SOLUTION INTRAVENOUS
Status: DISPENSED
Start: 2022-03-08

## (undated) RX ORDER — HEPARIN SODIUM (PORCINE) LOCK FLUSH IV SOLN 100 UNIT/ML 100 UNIT/ML
SOLUTION INTRAVENOUS
Status: DISPENSED
Start: 2022-01-28

## (undated) RX ORDER — ONDANSETRON 2 MG/ML
INJECTION INTRAMUSCULAR; INTRAVENOUS
Status: DISPENSED
Start: 2021-07-01

## (undated) RX ORDER — GABAPENTIN 300 MG/1
CAPSULE ORAL
Status: DISPENSED
Start: 2021-07-01

## (undated) RX ORDER — ONDANSETRON 2 MG/ML
INJECTION INTRAMUSCULAR; INTRAVENOUS
Status: DISPENSED
Start: 2022-01-01

## (undated) RX ORDER — FENTANYL CITRATE 50 UG/ML
INJECTION, SOLUTION INTRAMUSCULAR; INTRAVENOUS
Status: DISPENSED
Start: 2021-04-29

## (undated) RX ORDER — FENTANYL CITRATE 50 UG/ML
INJECTION, SOLUTION INTRAMUSCULAR; INTRAVENOUS
Status: DISPENSED
Start: 2022-01-01

## (undated) RX ORDER — EPINEPHRINE 1 MG/ML(1)
AMPUL (ML) INJECTION
Status: DISPENSED
Start: 2021-07-01

## (undated) RX ORDER — NITROGLYCERIN 0.4 MG/1
TABLET SUBLINGUAL
Status: DISPENSED
Start: 2021-09-29

## (undated) RX ORDER — ACETAMINOPHEN 325 MG/1
TABLET ORAL
Status: DISPENSED
Start: 2021-07-01

## (undated) RX ORDER — TROPICAMIDE 10 MG/ML
SOLUTION/ DROPS OPHTHALMIC
Status: DISPENSED
Start: 2021-03-10

## (undated) RX ORDER — TROPICAMIDE 10 MG/ML
SOLUTION/ DROPS OPHTHALMIC
Status: DISPENSED
Start: 2021-04-05

## (undated) RX ORDER — PROPOFOL 10 MG/ML
INJECTION, EMULSION INTRAVENOUS
Status: DISPENSED
Start: 2021-07-01

## (undated) RX ORDER — METOPROLOL TARTRATE 50 MG
TABLET ORAL
Status: DISPENSED
Start: 2021-09-29

## (undated) RX ORDER — PROPOFOL 10 MG/ML
INJECTION, EMULSION INTRAVENOUS
Status: DISPENSED
Start: 2022-01-01

## (undated) RX ORDER — LIDOCAINE HYDROCHLORIDE 10 MG/ML
INJECTION, SOLUTION EPIDURAL; INFILTRATION; INTRACAUDAL; PERINEURAL
Status: DISPENSED
Start: 2020-12-17

## (undated) RX ORDER — ONDANSETRON 2 MG/ML
INJECTION INTRAMUSCULAR; INTRAVENOUS
Status: DISPENSED
Start: 2020-12-17

## (undated) RX ORDER — LIDOCAINE HYDROCHLORIDE 10 MG/ML
INJECTION, SOLUTION EPIDURAL; INFILTRATION; INTRACAUDAL; PERINEURAL
Status: DISPENSED
Start: 2021-04-29

## (undated) RX ORDER — LIDOCAINE HYDROCHLORIDE 10 MG/ML
INJECTION, SOLUTION EPIDURAL; INFILTRATION; INTRACAUDAL; PERINEURAL
Status: DISPENSED
Start: 2021-07-01

## (undated) RX ORDER — SODIUM CHLORIDE 9 MG/ML
INJECTION, SOLUTION INTRAVENOUS
Status: DISPENSED
Start: 2021-04-29

## (undated) RX ORDER — HEPARIN SODIUM (PORCINE) LOCK FLUSH IV SOLN 100 UNIT/ML 100 UNIT/ML
SOLUTION INTRAVENOUS
Status: DISPENSED
Start: 2022-05-06

## (undated) RX ORDER — PROPOFOL 10 MG/ML
INJECTION, EMULSION INTRAVENOUS
Status: DISPENSED
Start: 2020-12-17